# Patient Record
Sex: MALE | Race: WHITE | NOT HISPANIC OR LATINO | Employment: OTHER | URBAN - METROPOLITAN AREA
[De-identification: names, ages, dates, MRNs, and addresses within clinical notes are randomized per-mention and may not be internally consistent; named-entity substitution may affect disease eponyms.]

---

## 2019-02-06 ENCOUNTER — TRANSFERRED RECORDS (OUTPATIENT)
Dept: HEALTH INFORMATION MANAGEMENT | Facility: CLINIC | Age: 79
End: 2019-02-06

## 2019-04-01 ENCOUNTER — HOSPITAL ENCOUNTER (OUTPATIENT)
Dept: CARDIOLOGY | Facility: CLINIC | Age: 79
Discharge: HOME | End: 2019-04-01
Attending: INTERNAL MEDICINE | Admitting: INTERNAL MEDICINE
Payer: COMMERCIAL

## 2019-04-01 DIAGNOSIS — I48.91 ATRIAL FIBRILLATION, UNSPECIFIED TYPE (H): ICD-10-CM

## 2019-04-01 PROCEDURE — 0298T ZIO PATCH HOLTER ADULT PEDIATRIC GREATER THAN 48 HRS: CPT | Performed by: INTERNAL MEDICINE

## 2019-04-01 PROCEDURE — 0296T ZIO PATCH HOLTER ADULT PEDIATRIC GREATER THAN 48 HRS: CPT

## 2019-09-12 ENCOUNTER — TRANSFERRED RECORDS (OUTPATIENT)
Dept: HEALTH INFORMATION MANAGEMENT | Facility: CLINIC | Age: 79
End: 2019-09-12

## 2019-09-24 ENCOUNTER — TRANSFERRED RECORDS (OUTPATIENT)
Dept: HEALTH INFORMATION MANAGEMENT | Facility: CLINIC | Age: 79
End: 2019-09-24

## 2019-10-30 ENCOUNTER — APPOINTMENT (OUTPATIENT)
Dept: CT IMAGING | Facility: CLINIC | Age: 79
DRG: 963 | End: 2019-10-30
Attending: EMERGENCY MEDICINE
Payer: COMMERCIAL

## 2019-10-30 ENCOUNTER — HOSPITAL ENCOUNTER (INPATIENT)
Facility: CLINIC | Age: 79
LOS: 10 days | Discharge: NURSING, REHAB OR LONG-TERM CARE | DRG: 963 | End: 2019-11-10
Attending: EMERGENCY MEDICINE | Admitting: INTERNAL MEDICINE
Payer: COMMERCIAL

## 2019-10-30 ENCOUNTER — APPOINTMENT (OUTPATIENT)
Dept: GENERAL RADIOLOGY | Facility: CLINIC | Age: 79
DRG: 963 | End: 2019-10-30
Attending: EMERGENCY MEDICINE
Payer: COMMERCIAL

## 2019-10-30 DIAGNOSIS — S12.291A OTHER CLOSED NONDISPLACED FRACTURE OF THIRD CERVICAL VERTEBRA, INITIAL ENCOUNTER (H): ICD-10-CM

## 2019-10-30 DIAGNOSIS — K21.00 GASTROESOPHAGEAL REFLUX DISEASE WITH ESOPHAGITIS: ICD-10-CM

## 2019-10-30 DIAGNOSIS — S22.029A CLOSED FRACTURE OF SECOND THORACIC VERTEBRA, UNSPECIFIED FRACTURE MORPHOLOGY, INITIAL ENCOUNTER (H): ICD-10-CM

## 2019-10-30 DIAGNOSIS — S09.90XA CLOSED HEAD INJURY, INITIAL ENCOUNTER: ICD-10-CM

## 2019-10-30 DIAGNOSIS — I48.20 CHRONIC ATRIAL FIBRILLATION (H): ICD-10-CM

## 2019-10-30 DIAGNOSIS — J94.2 HEMOTHORAX ON LEFT: ICD-10-CM

## 2019-10-30 DIAGNOSIS — R31.0 GROSS HEMATURIA: Primary | ICD-10-CM

## 2019-10-30 DIAGNOSIS — I10 ESSENTIAL HYPERTENSION: ICD-10-CM

## 2019-10-30 DIAGNOSIS — S22.42XA CLOSED FRACTURE OF MULTIPLE RIBS OF LEFT SIDE, INITIAL ENCOUNTER: ICD-10-CM

## 2019-10-30 DIAGNOSIS — G47.00 INSOMNIA, UNSPECIFIED TYPE: ICD-10-CM

## 2019-10-30 DIAGNOSIS — E11.9 TYPE 2 DIABETES, HBA1C GOAL < 7% (H): ICD-10-CM

## 2019-10-30 DIAGNOSIS — K21.9 GASTROESOPHAGEAL REFLUX DISEASE WITHOUT ESOPHAGITIS: ICD-10-CM

## 2019-10-30 LAB
ABO + RH BLD: NORMAL
ABO + RH BLD: NORMAL
ALBUMIN SERPL-MCNC: 3.6 G/DL (ref 3.4–5)
ALP SERPL-CCNC: 73 U/L (ref 40–150)
ALT SERPL W P-5'-P-CCNC: 30 U/L (ref 0–70)
ANION GAP SERPL CALCULATED.3IONS-SCNC: 5 MMOL/L (ref 3–14)
AST SERPL W P-5'-P-CCNC: 44 U/L (ref 0–45)
BASOPHILS # BLD AUTO: 0.1 10E9/L (ref 0–0.2)
BASOPHILS NFR BLD AUTO: 0.3 %
BILIRUB SERPL-MCNC: 0.6 MG/DL (ref 0.2–1.3)
BLD GP AB SCN SERPL QL: NORMAL
BLOOD BANK CMNT PATIENT-IMP: NORMAL
BUN SERPL-MCNC: 15 MG/DL (ref 7–30)
CALCIUM SERPL-MCNC: 8.8 MG/DL (ref 8.5–10.1)
CHLORIDE SERPL-SCNC: 104 MMOL/L (ref 94–109)
CO2 SERPL-SCNC: 31 MMOL/L (ref 20–32)
CREAT SERPL-MCNC: 1.09 MG/DL (ref 0.66–1.25)
DIFFERENTIAL METHOD BLD: ABNORMAL
EOSINOPHIL # BLD AUTO: 0.3 10E9/L (ref 0–0.7)
EOSINOPHIL NFR BLD AUTO: 1.2 %
ERYTHROCYTE [DISTWIDTH] IN BLOOD BY AUTOMATED COUNT: 15.5 % (ref 10–15)
GFR SERPL CREATININE-BSD FRML MDRD: 63 ML/MIN/{1.73_M2}
GLUCOSE BLDC GLUCOMTR-MCNC: 108 MG/DL (ref 70–99)
GLUCOSE BLDC GLUCOMTR-MCNC: 119 MG/DL (ref 70–99)
GLUCOSE SERPL-MCNC: 117 MG/DL (ref 70–99)
HCT VFR BLD AUTO: 38.3 % (ref 40–53)
HGB BLD-MCNC: 12.2 G/DL (ref 13.3–17.7)
IMM GRANULOCYTES # BLD: 0.2 10E9/L (ref 0–0.4)
IMM GRANULOCYTES NFR BLD: 0.9 %
INR PPP: 1.4 (ref 0.86–1.14)
INTERPRETATION ECG - MUSE: NORMAL
LYMPHOCYTES # BLD AUTO: 2 10E9/L (ref 0.8–5.3)
LYMPHOCYTES NFR BLD AUTO: 7.6 %
MCH RBC QN AUTO: 28.5 PG (ref 26.5–33)
MCHC RBC AUTO-ENTMCNC: 31.9 G/DL (ref 31.5–36.5)
MCV RBC AUTO: 90 FL (ref 78–100)
MONOCYTES # BLD AUTO: 1.3 10E9/L (ref 0–1.3)
MONOCYTES NFR BLD AUTO: 5.2 %
NEUTROPHILS # BLD AUTO: 21.7 10E9/L (ref 1.6–8.3)
NEUTROPHILS NFR BLD AUTO: 84.8 %
NRBC # BLD AUTO: 0 10*3/UL
NRBC BLD AUTO-RTO: 0 /100
PLATELET # BLD AUTO: 262 10E9/L (ref 150–450)
POTASSIUM SERPL-SCNC: 3.2 MMOL/L (ref 3.4–5.3)
PROT SERPL-MCNC: 6.3 G/DL (ref 6.8–8.8)
RADIOLOGIST FLAGS: ABNORMAL
RBC # BLD AUTO: 4.28 10E12/L (ref 4.4–5.9)
SODIUM SERPL-SCNC: 140 MMOL/L (ref 133–144)
SPECIMEN EXP DATE BLD: NORMAL
WBC # BLD AUTO: 25.6 10E9/L (ref 4–11)

## 2019-10-30 PROCEDURE — 99222 1ST HOSP IP/OBS MODERATE 55: CPT | Performed by: SURGERY

## 2019-10-30 PROCEDURE — 96376 TX/PRO/DX INJ SAME DRUG ADON: CPT

## 2019-10-30 PROCEDURE — 31500 INSERT EMERGENCY AIRWAY: CPT

## 2019-10-30 PROCEDURE — 86850 RBC ANTIBODY SCREEN: CPT | Performed by: EMERGENCY MEDICINE

## 2019-10-30 PROCEDURE — 40000986 XR CHEST PORT 1 VW

## 2019-10-30 PROCEDURE — 93005 ELECTROCARDIOGRAM TRACING: CPT

## 2019-10-30 PROCEDURE — 86901 BLOOD TYPING SEROLOGIC RH(D): CPT | Performed by: EMERGENCY MEDICINE

## 2019-10-30 PROCEDURE — 96375 TX/PRO/DX INJ NEW DRUG ADDON: CPT

## 2019-10-30 PROCEDURE — 86900 BLOOD TYPING SEROLOGIC ABO: CPT | Performed by: EMERGENCY MEDICINE

## 2019-10-30 PROCEDURE — 99222 1ST HOSP IP/OBS MODERATE 55: CPT | Performed by: INTERNAL MEDICINE

## 2019-10-30 PROCEDURE — 80053 COMPREHEN METABOLIC PANEL: CPT | Performed by: EMERGENCY MEDICINE

## 2019-10-30 PROCEDURE — 00000146 ZZHCL STATISTIC GLUCOSE BY METER IP

## 2019-10-30 PROCEDURE — 85610 PROTHROMBIN TIME: CPT | Performed by: EMERGENCY MEDICINE

## 2019-10-30 PROCEDURE — 25000132 ZZH RX MED GY IP 250 OP 250 PS 637: Performed by: INTERNAL MEDICINE

## 2019-10-30 PROCEDURE — 25000128 H RX IP 250 OP 636

## 2019-10-30 PROCEDURE — 99207 ZZC DOWN CODE DUE TO INITIAL EXAM: CPT | Performed by: INTERNAL MEDICINE

## 2019-10-30 PROCEDURE — 96374 THER/PROPH/DIAG INJ IV PUSH: CPT

## 2019-10-30 PROCEDURE — 72128 CT CHEST SPINE W/O DYE: CPT

## 2019-10-30 PROCEDURE — 72125 CT NECK SPINE W/O DYE: CPT

## 2019-10-30 PROCEDURE — 99285 EMERGENCY DEPT VISIT HI MDM: CPT | Mod: 25

## 2019-10-30 PROCEDURE — 70450 CT HEAD/BRAIN W/O DYE: CPT

## 2019-10-30 PROCEDURE — 25000128 H RX IP 250 OP 636: Performed by: EMERGENCY MEDICINE

## 2019-10-30 PROCEDURE — 72129 CT CHEST SPINE W/DYE: CPT

## 2019-10-30 PROCEDURE — 85025 COMPLETE CBC W/AUTO DIFF WBC: CPT | Performed by: EMERGENCY MEDICINE

## 2019-10-30 PROCEDURE — 71250 CT THORAX DX C-: CPT

## 2019-10-30 RX ORDER — CLONIDINE HYDROCHLORIDE 0.1 MG/1
0.3 TABLET ORAL 2 TIMES DAILY
Status: DISCONTINUED | OUTPATIENT
Start: 2019-10-30 | End: 2019-11-02

## 2019-10-30 RX ORDER — CLONIDINE HYDROCHLORIDE 0.3 MG/1
0.3 TABLET ORAL 2 TIMES DAILY
COMMUNITY
End: 2020-05-18 | Stop reason: ALTCHOICE

## 2019-10-30 RX ORDER — HYDROMORPHONE HYDROCHLORIDE 1 MG/ML
0.5 INJECTION, SOLUTION INTRAMUSCULAR; INTRAVENOUS; SUBCUTANEOUS ONCE
Status: COMPLETED | OUTPATIENT
Start: 2019-10-30 | End: 2019-10-30

## 2019-10-30 RX ORDER — ONDANSETRON 2 MG/ML
4 INJECTION INTRAMUSCULAR; INTRAVENOUS ONCE
Status: COMPLETED | OUTPATIENT
Start: 2019-10-30 | End: 2019-10-30

## 2019-10-30 RX ORDER — PROPRANOLOL HYDROCHLORIDE 20 MG/1
60 TABLET ORAL 2 TIMES DAILY
Status: DISCONTINUED | OUTPATIENT
Start: 2019-10-30 | End: 2019-11-01

## 2019-10-30 RX ORDER — FENTANYL CITRATE 50 UG/ML
INJECTION, SOLUTION INTRAMUSCULAR; INTRAVENOUS
Status: DISCONTINUED
Start: 2019-10-30 | End: 2019-10-30 | Stop reason: HOSPADM

## 2019-10-30 RX ORDER — MINOCYCLINE HYDROCHLORIDE 100 MG/1
100 CAPSULE ORAL 2 TIMES DAILY
Status: ON HOLD | COMMUNITY
End: 2019-12-30

## 2019-10-30 RX ORDER — NICOTINE POLACRILEX 4 MG
15-30 LOZENGE BUCCAL
Status: DISCONTINUED | OUTPATIENT
Start: 2019-10-30 | End: 2019-10-31

## 2019-10-30 RX ORDER — ONDANSETRON 2 MG/ML
INJECTION INTRAMUSCULAR; INTRAVENOUS
Status: DISCONTINUED
Start: 2019-10-30 | End: 2019-10-30 | Stop reason: HOSPADM

## 2019-10-30 RX ORDER — DEXTROSE MONOHYDRATE 25 G/50ML
25-50 INJECTION, SOLUTION INTRAVENOUS
Status: DISCONTINUED | OUTPATIENT
Start: 2019-10-30 | End: 2019-10-31

## 2019-10-30 RX ORDER — METOCLOPRAMIDE HYDROCHLORIDE 5 MG/ML
5 INJECTION INTRAMUSCULAR; INTRAVENOUS ONCE
Status: COMPLETED | OUTPATIENT
Start: 2019-10-30 | End: 2019-10-30

## 2019-10-30 RX ORDER — LISINOPRIL 20 MG/1
40 TABLET ORAL DAILY
Status: DISCONTINUED | OUTPATIENT
Start: 2019-10-31 | End: 2019-10-30

## 2019-10-30 RX ORDER — MORPHINE SULFATE 4 MG/ML
INJECTION, SOLUTION INTRAMUSCULAR; INTRAVENOUS
Status: COMPLETED
Start: 2019-10-30 | End: 2019-10-30

## 2019-10-30 RX ORDER — FENTANYL CITRATE 50 UG/ML
25 INJECTION, SOLUTION INTRAMUSCULAR; INTRAVENOUS ONCE
Status: COMPLETED | OUTPATIENT
Start: 2019-10-30 | End: 2019-10-30

## 2019-10-30 RX ORDER — LOSARTAN POTASSIUM AND HYDROCHLOROTHIAZIDE 25; 100 MG/1; MG/1
1 TABLET ORAL DAILY
Status: ON HOLD | COMMUNITY
End: 2019-11-10

## 2019-10-30 RX ORDER — PROPRANOLOL HYDROCHLORIDE 60 MG/1
60 TABLET ORAL AT BEDTIME
Status: ON HOLD | COMMUNITY
End: 2019-11-10

## 2019-10-30 RX ORDER — LOSARTAN POTASSIUM AND HYDROCHLOROTHIAZIDE 25; 100 MG/1; MG/1
1 TABLET ORAL DAILY
Status: DISCONTINUED | OUTPATIENT
Start: 2019-10-31 | End: 2019-10-31

## 2019-10-30 RX ORDER — HYDROMORPHONE HYDROCHLORIDE 1 MG/ML
0.5 INJECTION, SOLUTION INTRAMUSCULAR; INTRAVENOUS; SUBCUTANEOUS
Status: COMPLETED | OUTPATIENT
Start: 2019-10-30 | End: 2019-10-31

## 2019-10-30 RX ORDER — SIMVASTATIN 10 MG
10 TABLET ORAL AT BEDTIME
Status: DISCONTINUED | OUTPATIENT
Start: 2019-10-30 | End: 2019-11-07

## 2019-10-30 RX ORDER — PIOGLITAZONEHYDROCHLORIDE 30 MG/1
30 TABLET ORAL
Status: ON HOLD | COMMUNITY
End: 2020-04-30

## 2019-10-30 RX ADMIN — SIMVASTATIN 10 MG: 10 TABLET, FILM COATED ORAL at 22:44

## 2019-10-30 RX ADMIN — METOCLOPRAMIDE 5 MG: 5 INJECTION, SOLUTION INTRAMUSCULAR; INTRAVENOUS at 20:06

## 2019-10-30 RX ADMIN — FENTANYL CITRATE 25 MCG: 50 INJECTION, SOLUTION INTRAMUSCULAR; INTRAVENOUS at 18:51

## 2019-10-30 RX ADMIN — HYDROMORPHONE HYDROCHLORIDE 0.5 MG: 1 INJECTION, SOLUTION INTRAMUSCULAR; INTRAVENOUS; SUBCUTANEOUS at 17:56

## 2019-10-30 RX ADMIN — PROPRANOLOL HYDROCHLORIDE 60 MG: 20 TABLET ORAL at 22:44

## 2019-10-30 RX ADMIN — CLONIDINE HYDROCHLORIDE 0.3 MG: 0.1 TABLET ORAL at 22:43

## 2019-10-30 RX ADMIN — ONDANSETRON 4 MG: 2 INJECTION INTRAMUSCULAR; INTRAVENOUS at 19:37

## 2019-10-30 RX ADMIN — MORPHINE SULFATE 4 MG: 4 INJECTION INTRAVENOUS at 15:51

## 2019-10-30 RX ADMIN — ONDANSETRON 4 MG: 2 INJECTION INTRAMUSCULAR; INTRAVENOUS at 16:41

## 2019-10-30 RX ADMIN — SODIUM CHLORIDE 1000 ML: 9 INJECTION, SOLUTION INTRAVENOUS at 20:21

## 2019-10-30 RX ADMIN — HYDROMORPHONE HYDROCHLORIDE 0.5 MG: 1 INJECTION, SOLUTION INTRAMUSCULAR; INTRAVENOUS; SUBCUTANEOUS at 22:43

## 2019-10-30 RX ADMIN — HYDROMORPHONE HYDROCHLORIDE 0.5 MG: 1 INJECTION, SOLUTION INTRAMUSCULAR; INTRAVENOUS; SUBCUTANEOUS at 16:40

## 2019-10-30 ASSESSMENT — ENCOUNTER SYMPTOMS
NECK PAIN: 1
LIGHT-HEADEDNESS: 0
DIZZINESS: 0
GASTROINTESTINAL NEGATIVE: 1
CARDIOVASCULAR NEGATIVE: 1
NUMBNESS: 0
PSYCHIATRIC NEGATIVE: 1
SHORTNESS OF BREATH: 1
WEAKNESS: 0
HEADACHES: 0
BACK PAIN: 1

## 2019-10-30 ASSESSMENT — MIFFLIN-ST. JEOR
SCORE: 1601.25
SCORE: 1646.12

## 2019-10-30 ASSESSMENT — VISUAL ACUITY: OU: NORMAL ACUITY

## 2019-10-30 NOTE — CONSULTS
[facility]  Trauma Consult         Bryan Hare MD    Tc Garcia MRN# [id number]   YOB: 1940 Age: 79 year old      Date of Admission:  10/30/2019         Assessment and Plan:   Patient is a 79 year old male with multiple injuries after a fall    PLAN:  I have personally reviewed all imaging and performed a trauma examination.  Pertinent findings include multiple left-sided rib fractures with a moderate left pneumothorax. Thoracic surgery was contacted and a chest tube will be placed in the emergency room due to the hemothorax and history of taking Eliquis. Thoracic surgery will also follow ongoing care related to his chest injury.  His CT also showed an acute C3 spinous process fracture and acute fractures of the anterior lateral T2.  Dedicated thoracic spine imaging has been ordered.  Spine surgery will be consulted for further management regarding these issues.  He does not have any other apparent injuries on comprehensive examination.  He will be at high risk of respiratory issues given the significant rib fractures.  He will need aggressive pulmonary hygiene and close monitoring.  Acute care surgery will follow-up in a.m..          Chief Complaint:     Chief Complaint   Patient presents with     Fall          History of Present Illness:   Tc Garcia is a 79 year old male who I was asked to see in consultation by Dr. Sebastian for fall with multiple injuries.  The patient had a unwitnessed fall at ground height and fell down a half flight of stairs.  He slipped and landed on his left shoulder and head.  He felt fine before the fall without any symptoms.  He had no loss of consciousness.  He complained of severe shoulder and chest pain after the fall.  He was brought to the ER by an ambulance.  He has a history of A. fib on Eliquis.  He is currently complaining of severe left-sided chest pain.  He denies shortness of breath. Patient denies fevers, chills, nausea, vomiting,  "SOB, chest pain, abdominal pain..          Physical Exam:   /82   Pulse 68   Temp 97.6  F (36.4  C) (Oral)   Resp 9   Ht 1.778 m (5' 10\")   Wt 93 kg (205 lb)   SpO2 91%   BMI 29.41 kg/m    General: GCS- Eyes-Spontaneous--open with blinking at baseline  =  4 points Verbal Oriented  =  5 points Motor Obeys commands for movement  =  6 points  Head: No abrasions or external trauma.  Eyes: Pupils 3 mm bilaterally and reactive to light, EOM full, no proptosis, no conjunctival injection  Ears: No external auditory canal discharge or bleeding.  Nose: No rhinorrhea or bleeding noted  Mouth: Atraumatic. No posterior pharyngeal erythema. No dental trauma. No malocclusion  Neck: No midline tenderness. No step off deformity.  Cardiovascular: Pulses palpable  Respiratory: Equal bilateral. No distress. No crepitance.  GI: Abdomen Soft Non-Tender entire abdomen No hernias palpated..  Musculoskeletal: Full ROM of all major joints w/o crepitance. Extremities are warm and well perfused. Compression of pelvis elicits no pain.   Back- No TTP over midline thoracic or lumbar spine, no abrasions, overlying skin changes, or palpable step-offs.  Neurologic: 5/5 UE and LE strength.   Integument: No obvious rashes or external injury noted  Psychiatric: Mood and Affect normal. No anxiety.        Past Medical History:     Past Medical History:   Diagnosis Date     Diabetic PROTEINURIA 2003     Mixed hyperlipidemia 2003     Personal history of colonic polyps 1972    gets colonoscopy every five years, due in 2006     Rosacea 1989     Type II or unspecified type diabetes mellitus without mention of complication, not stated as uncontrolled 1999     Unspecified essential hypertension 1994          Past Surgical History:     Past Surgical History:   Procedure Laterality Date     HC REMOVE TONSILS/ADENOIDS,<10 Y/O      T & A <11y.o.          Current Medications:         HYDROmorphone       Home Medications:     Prior to Admission " medications    Medication Sig Last Dose Taking? Auth Provider   ACCU-CHEK COMPACT TEST DRUM VI STRP check blood sugar three times a day and PRN   Tc Palacios MD   ASPIRIN 81 MG OR TABS 1 tab po QD (Once per day)   Tc Palacios MD   AVALIDE 300-25 MG OR TABS 1 TABLET DAILY   Tc Palacios MD   BYETTA 5 MCG PEN 5 MCG/0.02ML SC SOLN INJECT 5 MCG TWICE A DAY AS DIRECTED   Tc Palacios MD   GLUCAGON EMERGENCY KIT 1 MG IJ KIT as directed   Tc Palacios MD   HUMALOG 100 UNIT/ML SC SOLN For insulin pump use--2 vials should last about 30 days   Tc Palacios MD   LISINOPRIL 40 MG PO TABS 1 TABLET DAILY   Tc Palacios MD   LOVASTATIN 20 MG PO TABS 1 TABLET DAILY AT DINNER   Tc Palacios MD   NEW MED actos plus metformin 15/875mg bid   Tc Palacios MD   VERAPAMIL HCL### 240 MG OR TBCR 1 TABLET DAILY   Tc Palacios MD   VIAGRA 100 MG OR TABS ONE TABLET TAKEN AT LEAST 30 MINUTES BEFORE INTERCOURSE   Tc Palacios MD          Allergies:     Allergies   Allergen Reactions     No Known Drug Allergies           Family History:      Family History   Problem Relation Age of Onset     Family History Negative Mother          age 71     Family History Negative Father          age 70     Diabetes Brother         alive age 77     Diabetes Brother         alive age 76     Family History Negative Brother              Family History Negative Brother              Diabetes Sister         alive age74     Family History Negative Sister          age 86     Heart Disease Sister              Family History Negative Sister              Family History Negative Sister              Diabetes Sister      Diabetes Sister      Diabetes Brother      Diabetes Brother          Social History:   Tc Garcia  reports that he quit smoking about 11 years ago. His smoking use included cigarettes. He has a 8.00 pack-year smoking history. He has never used  smokeless tobacco. He reports current alcohol use of about 35.0 standard drinks of alcohol per week.        Review of Systems:   The 10 point Review of Systems is negative other than noted in the HPI.       Labs/Imaging   All new lab and imaging data was reviewed.   Bryan Hare M.D.  [facility]

## 2019-10-30 NOTE — ED TRIAGE NOTES
Fall downstairs into the basement. Sinus bradycardic with EMS. 50 of Fentanyl given per EMS. Patient in a back board and C-collar

## 2019-10-30 NOTE — ED PROVIDER NOTES
History     Chief Complaint:    Fall      HPI   Tc Garcia is a 79 year old male who presents after an unwitnessed mechanical fall from ground height down half flight of stairs. Pt reports he tripped on a slipper on the steps, fell and hit his head and L shoulder against wall. Denies LOC, reports feeling fine before fall, no dizziness, lightheadedness, chest pain, or HA. He complains of left shoulder pain and left chest wall pain.   On eliquis and verapamil for a-fib. He does have DM-II, on insulin, and reports BG have been well controlled. EKG in the field found prolonged QT with sinus bradycardia of 49 bpm. Denies any EtOH or drug use.      Allergies:    Allergies   Allergen Reactions     No Known Drug Allergies         Medications:      No current outpatient medications on file.      Problem List:      Patient Active Problem List    Diagnosis Date Noted     TYPE 2 DIABETES, HBA1C GOAL < 7% 10/31/2010     Priority: Medium     HYPERLIPIDEMIA LDL GOAL <100 10/31/2010     Priority: Medium     Pain in limb 07/18/2006     Priority: Medium     Plantar fascial fibromatosis 07/18/2006     Priority: Medium     Diabetes mellitus, type 2 (H) 07/07/2004     Priority: Medium     Problem list name updated by automated process. Provider to review       Mixed hyperlipidemia 07/07/2004     Priority: Medium     Essential hypertension 07/07/2004     Priority: Medium     Problem list name updated by automated process. Provider to review          Past Medical History:      Past Medical History:   Diagnosis Date     Diabetic PROTEINURIA 2003     Mixed hyperlipidemia 2003     Personal history of colonic polyps 1972     Rosacea 1989     Type II or unspecified type diabetes mellitus without mention of complication, not stated as uncontrolled 1999     Unspecified essential hypertension 1994       Past Surgical History:      Past Surgical History:   Procedure Laterality Date     HC REMOVE TONSILS/ADENOIDS,<10 Y/O      T & A <11y.o.  "      Family History:      Family History   Problem Relation Age of Onset     Family History Negative Mother          age 71     Family History Negative Father          age 70     Diabetes Brother         alive age 77     Diabetes Brother         alive age 76     Family History Negative Brother              Family History Negative Brother              Diabetes Sister         alive age76     Family History Negative Sister          age 86     Heart Disease Sister              Family History Negative Sister              Family History Negative Sister              Diabetes Sister      Diabetes Sister      Diabetes Brother      Diabetes Brother        Social History:    Marital Status:   [2]  Social History     Tobacco Use     Smoking status: Former Smoker     Packs/day: 0.20     Years: 40.00     Pack years: 8.00     Types: Cigarettes     Last attempt to quit: 2008     Years since quittin.8     Smokeless tobacco: Never Used     Tobacco comment: occasional   Substance Use Topics     Alcohol use: Yes     Alcohol/week: 35.0 standard drinks     Comment: occasional     Drug use: None        Review of Systems   Respiratory: Positive for shortness of breath.    Cardiovascular: Negative.    Gastrointestinal: Negative.    Genitourinary: Negative.    Musculoskeletal: Positive for back pain and neck pain.   Neurological: Negative for dizziness, weakness, light-headedness, numbness and headaches.   Psychiatric/Behavioral: Negative.    All other systems reviewed and are negative.    Pain in L shoulder, L chest wall, and neck. No back pain. Denies numbness    Physical Exam   First Vitals:  BP: (!) 151/116  Pulse: 52  Heart Rate: 72  Temp: 97.6  F (36.4  C)  Resp: 18  Height: 177.8 cm (5' 10\")  Weight: 93 kg (205 lb)  SpO2: 97 %      Physical Exam  Gen: Elderly adult male, in moderate distress  Head: no signs of trauma  Eyes\" normal  Neck: c-collar, mild " tenderness  Neuro: PERRLA, oriented to person, place, situation. CN II-XII grossly intact. Symmetric 5/5 , plantar flexion and dorsiflexion. Sensation in tact throughout.  CV: Regular rate and rhythm, no murmur, rubs, or gallops appreciated  Pulm: Normal breath sounds in all lung fields. Tachypneic. Wincing with pain on deep inspiration.   Abdomen: Non-tender, non-distended, normal contour  MSK: Tender over L AC joint and L anterior chest wall. Non-tender L humerus.  Skin: Lacerations on posterior R hand, posterior R wrist, and L hand, ~1 cm each. No lacerations or ecchymoses on head or elsewhere.  Psych: normal      Emergency Department Course   EKG  No ischemic changes, sinus harrison    Imaging:  XR Chest Port 1 View   Final Result   IMPRESSION: Chest tube placed on the left with tip near the apex.   Multiple rib fractures noted on the left. Right lung clear.      ALLYSON MARTINEZ MD      CT Thoracic Spine w Contrast   Preliminary Result   IMPRESSION:   1. Subtle nondisplaced acute fracture involving the T2 inferior   endplate extending into the T2-T3 disc space.   2. Age-indeterminate mild contour deformities involving the T2 and T3   superior endplates.   3. Acute left T5-T9 transverse process fractures.   4. Multiple acute left-sided rib fractures, left pulmonary contusion,   and left hemopneumothorax, better characterized on chest CT of same   date.      Chest CT w/o contrast   Final Result   IMPRESSION: Multiple left-sided rib fractures, ribs 6, 8, and 9 are   fractured in two places. Moderate left hemothorax and trace pleural   air on the left.      ALLYSON MARTINEZ MD      CT Head w/o Contrast   Preliminary Result   IMPRESSION:   1. No CT findings of acute traumatic intracranial abnormality.   2. Generalized brain parenchymal volume loss. Scattered   hypoattenuation in the cerebral white matter, likely related to small   vessel ischemic disease.   3. Other chronic-appearing intracranial findings, as detailed in  the   body of the report.   4. On the  image, note is made of asymmetric opacification of the   left lung apex, incompletely evaluated. Recommend dedicated chest   radiographs for further characterization.      CT Cervical Spine w/o Contrast   Preliminary Result   Abnormal   IMPRESSION:   1. Acute C3 spinous process fracture.   2. Acute fractures involving the right anterolateral aspect of the T2   vertebral body extending into the disc space. Recommend dedicated   thoracic spine CT.   3. Subtle cortical irregularity involving the right aspect of the C7   vertebral body, indeterminate for subtle acute fracture.   4. Multiple acute left-sided upper rib fractures with left-sided   pulmonary contusion and hemothorax.      [Critical Result: Acute cervical spine fracture]      Finding was identified on 10/30/2019 5:38 PM.       Dr. Sebastian was contacted by me on 10/30/2019 5:52 PM and verbalized   understanding of the critical result.           Laboratory:  Results for orders placed or performed during the hospital encounter of 10/30/19   CT Head w/o Contrast    Narrative    CT SCAN OF THE HEAD WITHOUT CONTRAST   10/30/2019 5:21 PM     HISTORY: Trauma    TECHNIQUE: Axial images of the head and coronal reformations without  IV contrast material. Radiation dose for this scan was reduced using  automated exposure control, adjustment of the mA and/or kV according  to patient size, or iterative reconstruction technique.    COMPARISON: None.    FINDINGS: There is no evidence of intracranial hemorrhage, mass, acute  infarct or other acute abnormality. Generalized atrophy of the brain.  There is low attenuation in the white matter of the cerebral  hemispheres consistent with sequelae of small vessel ischemic disease.  Ventricular size is within normal limits without evidence of  hydrocephalus. There is a round low-attenuation lesion measuring  approximately 4 mm (series 2 image 17) interposed between the anterior  aspect of  the midbrain and the superior aspect of the basilar artery,  essentially in the upper aspect of the interpeduncular cistern. This  lesion appears to demonstrate fat attenuation internally, and may  represent a small lipoma. No associated mass effect.    Bilateral lens replacements. Polypoid mucosal thickening in the  alveolar recess of the left maxillary sinus. Mastoid and middle ear  cavities appear clear. Advanced bilateral temporomandibular joint  osteoarthritis. The bony calvarium and bones of the skull base appear  intact.     On the  image, there appears to be asymmetric opacification  projecting over the left lung apex, incompletely evaluated.      Impression    IMPRESSION:  1. No CT findings of acute traumatic intracranial abnormality.  2. Generalized brain parenchymal volume loss. Scattered  hypoattenuation in the cerebral white matter, likely related to small  vessel ischemic disease.  3. Other chronic-appearing intracranial findings, as detailed in the  body of the report.  4. On the  image, note is made of asymmetric opacification of the  left lung apex, incompletely evaluated. Recommend dedicated chest  radiographs for further characterization.   CT Cervical Spine w/o Contrast   Result Value Ref Range    Radiologist flags Acute cervical spine fracture (AA)     Narrative    CT CERVICAL SPINE WITHOUT CONTRAST   10/30/2019 5:20 PM     HISTORY: Trauma     TECHNIQUE: Axial images of the cervical spine were obtained without  intravenous contrast. Multiplanar reformations were performed.   Radiation dose for this scan was reduced using automated exposure  control, adjustment of the mA and/or kV according to patient size, or  iterative reconstruction technique.    COMPARISON: None.    FINDINGS: There is an acute fracture that involves the spinous process  of C3 (series 6 image 38-40, series 5 image 37). There is a subtle  cortical irregularity involving the right aspect of the C7 vertebral  body which  is indeterminate for a nondisplaced fracture. There are  acute fractures involving right anterolateral aspect of the T2  inferior endplate extending into the intervertebral disc space,  possibly also involving the superior endplate of T2 but incompletely  evaluated. Multiple left-sided upper rib fractures. Contusion of the  left upper pulmonary lobe with associated pleural fluid/hemothorax.  Small volume scattered air noted within the anterior and posterior  soft tissues of the upper chest/back.    There is a background of multilevel degenerative disc disease and  facet arthropathy of the cervical spine. Multilevel uncovertebral  arthropathy is also noted. No high-grade spinal stenosis is  identified. Varying degrees of multilevel neural foraminal narrowing  is seen, most pronounced on the left at C4-C5. Bilateral carotid  bifurcation atherosclerotic calcifications are noted.      Impression    IMPRESSION:  1. Acute C3 spinous process fracture.  2. Acute fractures involving the right anterolateral aspect of the T2  vertebral body extending into the disc space. Recommend dedicated  thoracic spine CT.  3. Subtle cortical irregularity involving the right aspect of the C7  vertebral body, indeterminate for subtle acute fracture.  4. Multiple acute left-sided upper rib fractures with left-sided  pulmonary contusion and hemothorax.    [Critical Result: Acute cervical spine fracture]    Finding was identified on 10/30/2019 5:38 PM.     Dr. Sebastian was contacted by me on 10/30/2019 5:52 PM and verbalized  understanding of the critical result.    Chest CT w/o contrast    Narrative    CT CHEST WITHOUT CONTRAST 10/30/2019 5:21 PM     HISTORY: Trauma, left chest/shoulder pain.    COMPARISON: None.    TECHNIQUE: Volumetric helical acquisition of CT images of the chest  from the clavicles to the kidneys were acquired without IV contrast.  Radiation dose for this scan was reduced using automated exposure  control, adjustment of the  mA and/or kV according to patient size, or  iterative reconstruction technique.    FINDINGS:  Posterolateral fourth, fifth, sixth, seventh, eighth, and  ninth rib fractures noted on the left. Rib 7 is displaced, and  overlapping. Ribs 8 and 9 are displaced as well as 5, 4, and 6. There  is a moderate hemothorax. Trace probable pneumothorax inferiorly.  Pulmonary contusion and/or atelectasis noted on the left. Minimal  right pleural effusion and associated atelectasis. Medial rib  fractures of 8th and 9th ribs as well as a posterior fracture of the  sixth rib. There are extensive coronary vascular calcifications and/or  stents consistent with coronary artery disease. There are mild  atherosclerotic changes of the visualized aorta and its branches.  There is no evidence of aortic aneurysm. No acute findings in the  visualized upper abdomen.      Impression    IMPRESSION: Multiple left-sided rib fractures, ribs 6, 8, and 9 are  fractured in two places. Moderate left hemothorax and trace pleural  air on the left.    ALLYSON MARTINEZ MD   CT Thoracic Spine w Contrast    Narrative    CT THORACIC SPINE WITHOUT CONTRAST   10/30/2019 6:32 PM     HISTORY: Thoracic spine fracture, traumatic. Trauma, multiple rib  fractures, CT cervical spine shows C3 spinous process fracture and  T2-T3 fracture into spine.     TECHNIQUE: Axial images of the thoracic spine were obtained without  intravenous contrast. Multiplanar reformations were performed.  Radiation dose for this scan was reduced using automated exposure  control, adjustment of the mA and/or kV according to patient size, or  iterative reconstruction technique.    COMPARISON: Cervical spine CT same date.    FINDINGS: Subtle nondisplaced acute fracture involving the inferior  endplate of T2 (series 15 image 16). The fracture line extends into  the region of the T2-T3 intervertebral disc space. Slight undulation  of the superior endplates of T3 and T4, age-indeterminate.  The  remaining vertebral body heights appear maintained. There are acute  nondisplaced fractures involving the left T5-T9 transverse processes.  Alignment of the thoracic spine is within normal limits. Bridging  ossification along the right anterolateral aspect of the upper to mid  thoracic spine, most pronounced from T3 through T11, consistent with  diffuse idiopathic skeletal hyperostosis. There is a background of  mild multilevel degenerative disc disease. No high-grade spinal  stenosis is seen.    Multiple acute left-sided rib fractures are also noted. Partially  visualized left pulmonary contusions with left hemothorax and trace  left pleural air, better characterized on chest CT of same date.  Please see chest CT of same day for details regarding extraspinal  findings. Small right pleural effusion also noted with basilar  atelectasis. Coronary artery and aortic atherosclerotic calcifications  are noted.      Impression    IMPRESSION:  1. Subtle nondisplaced acute fracture involving the T2 inferior  endplate extending into the T2-T3 disc space.  2. Age-indeterminate mild contour deformities involving the T2 and T3  superior endplates.  3. Acute left T5-T9 transverse process fractures.  4. Multiple acute left-sided rib fractures, left pulmonary contusion,  and left hemopneumothorax, better characterized on chest CT of same  date.   XR Chest Port 1 View    Narrative    CHEST ONE VIEW SUPINE 10/30/2019 7:06 PM     HISTORY: Chest tube placement    COMPARISON: None.      Impression    IMPRESSION: Chest tube placed on the left with tip near the apex.  Multiple rib fractures noted on the left. Right lung clear.    ALLYSON MARTINEZ MD   CBC with platelets differential   Result Value Ref Range    WBC 25.6 (H) 4.0 - 11.0 10e9/L    RBC Count 4.28 (L) 4.4 - 5.9 10e12/L    Hemoglobin 12.2 (L) 13.3 - 17.7 g/dL    Hematocrit 38.3 (L) 40.0 - 53.0 %    MCV 90 78 - 100 fl    MCH 28.5 26.5 - 33.0 pg    MCHC 31.9 31.5 - 36.5 g/dL     RDW 15.5 (H) 10.0 - 15.0 %    Platelet Count 262 150 - 450 10e9/L    Diff Method Automated Method     % Neutrophils 84.8 %    % Lymphocytes 7.6 %    % Monocytes 5.2 %    % Eosinophils 1.2 %    % Basophils 0.3 %    % Immature Granulocytes 0.9 %    Nucleated RBCs 0 0 /100    Absolute Neutrophil 21.7 (H) 1.6 - 8.3 10e9/L    Absolute Lymphocytes 2.0 0.8 - 5.3 10e9/L    Absolute Monocytes 1.3 0.0 - 1.3 10e9/L    Absolute Eosinophils 0.3 0.0 - 0.7 10e9/L    Absolute Basophils 0.1 0.0 - 0.2 10e9/L    Abs Immature Granulocytes 0.2 0 - 0.4 10e9/L    Absolute Nucleated RBC 0.0    Comprehensive metabolic panel   Result Value Ref Range    Sodium 140 133 - 144 mmol/L    Potassium 3.2 (L) 3.4 - 5.3 mmol/L    Chloride 104 94 - 109 mmol/L    Carbon Dioxide 31 20 - 32 mmol/L    Anion Gap 5 3 - 14 mmol/L    Glucose 117 (H) 70 - 99 mg/dL    Urea Nitrogen 15 7 - 30 mg/dL    Creatinine 1.09 0.66 - 1.25 mg/dL    GFR Estimate 63 >60 mL/min/[1.73_m2]    GFR Estimate If Black 74 >60 mL/min/[1.73_m2]    Calcium 8.8 8.5 - 10.1 mg/dL    Bilirubin Total 0.6 0.2 - 1.3 mg/dL    Albumin 3.6 3.4 - 5.0 g/dL    Protein Total 6.3 (L) 6.8 - 8.8 g/dL    Alkaline Phosphatase 73 40 - 150 U/L    ALT 30 0 - 70 U/L    AST 44 0 - 45 U/L   INR   Result Value Ref Range    INR 1.40 (H) 0.86 - 1.14   Glucose by meter   Result Value Ref Range    Glucose 108 (H) 70 - 99 mg/dL   EKG 12 lead   Result Value Ref Range    Interpretation ECG Click View Image link to view waveform and result    ABO/Rh type and screen   Result Value Ref Range    ABO O     RH(D) Pos     Antibody Screen Neg     Test Valid Only At [facility]        Specimen Expires 11/02/2019          [facility]    -Chest Tube Insertion  Date/Time: 10/30/2019 9:39 PM  Performed by: Estrada Sebastian MD  Authorized by: Estrada Sebastian MD     UNIVERSAL PROTOCOL   Site Marked: Yes  Prior Images Obtained and Reviewed:  Yes  Patient identity confirmed:  Verbally with patient, arm  band and hospital-assigned identification number  Patient was reevaluated immediately before administering moderate or deep sedation or anesthesia  Confirmation Checklist:  Patient's identity using two indicators, relevant allergies, procedure was appropriate and matched the consent or emergent situation and correct equipment/implants were available  Time out: Immediately prior to the procedure a time out was called    Preparation: Patient was prepped and draped in usual sterile fashion      PRE-PROCEDURE DETAILS:     Skin preparation:  ChloraPrep  ANESTHESIA (see MAR for exact dosages):     Anesthesia method:  Local infiltration    Local anesthetic:  Lidocaine 1% w/o epi    PROCEDURE DETAILS:     Placement location:  L lateral    Scalpel size:  10    Tube size (Fr):  24    Dissection instrument:  Sofía clamp    Ultrasound guidance: no      Tension pneumothorax: no      Tube connected to:  Suction    Drainage characteristics:  Bloody    Suture material:  0 silk    Dressing:  4x4 sterile gauze and petrolatum-impregnated gauze    POST-PROCEDURE DETAILS:     Post-insertion x-ray findings: tube in good position    PROCEDURE   Patient Tolerance:  Patient tolerated the procedure well with no immediate complications      :            Interventions:  Medications   HYDROmorphone (PF) (DILAUDID) injection 0.5 mg (0.5 mg Intravenous Given 10/30/19 1756)   0.9% sodium chloride BOLUS (1,000 mLs Intravenous Not Given 10/30/19 2126)     Followed by   0.9% sodium chloride BOLUS (1,000 mLs Intravenous New Bag 10/30/19 2021)   cloNIDine (CATAPRES) tablet 0.3 mg (has no administration in time range)   losartan-hydrochlorothiazide (HYZAAR) 100-25 MG per tablet 1 tablet (has no administration in time range)   propranolol (INDERAL) tablet 60 mg (has no administration in time range)   simvastatin (ZOCOR) tablet 10 mg (has no administration in time range)   glucose gel 15-30 g (has no administration in time range)     Or   dextrose 50 %  injection 25-50 mL (has no administration in time range)     Or   glucagon injection 1 mg (has no administration in time range)   insulin glargine (LANTUS PEN) injection 20 Units (has no administration in time range)   insulin aspart (NovoLOG) inj (RAPID ACTING) (has no administration in time range)   insulin aspart (NovoLOG) inj (RAPID ACTING) (1 Units Subcutaneous Not Given 10/30/19 2127)   morphine (PF) 4 MG/ML injection (4 mg  Given 10/30/19 1551)   HYDROmorphone (PF) (DILAUDID) injection 0.5 mg (0.5 mg Intravenous Given 10/30/19 1640)   ondansetron (ZOFRAN) injection 4 mg (4 mg Intravenous Given 10/30/19 1641)   fentaNYL (PF) (SUBLIMAZE) injection 25 mcg ( Intravenous Not Given 10/30/19 2116)   ondansetron (ZOFRAN) injection 4 mg (4 mg Intravenous Given 10/30/19 1937)   metoclopramide (REGLAN) injection 5 mg (5 mg Intravenous Given 10/30/19 2006)         Emergency Department Course:  Started on     1758 I spoke with Dr. Mark Caballero of the Thoracic Surgery service regarding patient's presentation, findings, and plan of care.          Impression & Plan          CMS Diagnoses:         Medical Decision Making:  Presents with mechanical fall down approximately 5 stairs.  Patient has pain to the left shoulder and chest region.  Considering partial trauma activation was made although he did not fall far enough and had stable vital signs.  CT head neck and chest revealed multiple rib fractures and hemothorax and concern for C3 spinous process and T2 suggested further imaging.  Reconstruction of the CT chest was obtained showing findings as above.  Spoke with Dr. Mark You regarding the chest trauma and chest tube was placed by myself with 850 cc of blood.  Patient tolerated procedure well and was moved around to see if any ongoing bleeding would occur and it did not.  Spoke with the hospitalist service as well as Dr. Esposito from trauma surgery.  There is discussion back and forth as to who would be primary admitting  and ultimately went to Dr. Esposito given the multisystem trauma.  Spoke with Dr. Tanner regarding the C3 spinous process fracture and that T2 fracture and that the radiology is requesting advanced imaging.  Given his other injuries spelt he can go to the floor to manage his chest tube and hemothorax and images could be obtained in a future date.  Cervical collar spine was instructed to be taken off.  Patient felt much better with this.  Patient has no numbness or weakness in his lower extremities and good dorsiflexion plantarflexion of his great toes.  Patient was grateful for his care.  Discussed the seriousness of the injury with family.  Patient was given IV fluids.    Diagnosis:    ICD-10-CM    1. Closed fracture of multiple ribs of left side, initial encounter S22.42XA ABO/Rh type and screen     Glucose by meter     Glucose by meter   2. Hemothorax on left J94.2    3. Closed head injury, initial encounter S09.90XA    4. Other closed nondisplaced fracture of third cervical vertebra, initial encounter (H) S12.291A    5. Closed fracture of second thoracic vertebra, unspecified fracture morphology, initial encounter (H) S22.029A        Total critical  Care time excluding procedures 45 minutes    Disposition:  Admit to ICU    Discharge Medications:  Current Discharge Medication List        I saw the patient in conjunction with the resident did my own history and physical and reviewed the images and care and agree with the above plan and treatment.    MD Job Dickerson  10/30/2019    EMERGENCY DEPARTMENT       Estrada Sebastian MD  11/02/19 3504

## 2019-10-30 NOTE — ED NOTES
Bed: ED13  Expected date:   Expected time:   Means of arrival:   Comments:  Sai Alexander Fall down stairs C-collar and backboard  79 m

## 2019-10-31 ENCOUNTER — APPOINTMENT (OUTPATIENT)
Dept: GENERAL RADIOLOGY | Facility: CLINIC | Age: 79
DRG: 963 | End: 2019-10-31
Attending: PHYSICIAN ASSISTANT
Payer: COMMERCIAL

## 2019-10-31 ENCOUNTER — APPOINTMENT (OUTPATIENT)
Dept: ULTRASOUND IMAGING | Facility: CLINIC | Age: 79
DRG: 963 | End: 2019-10-31
Attending: INTERNAL MEDICINE
Payer: COMMERCIAL

## 2019-10-31 PROBLEM — J94.2 HEMOTHORAX ON LEFT: Status: ACTIVE | Noted: 2019-10-31

## 2019-10-31 LAB
ALBUMIN UR-MCNC: 30 MG/DL
ANION GAP SERPL CALCULATED.3IONS-SCNC: 9 MMOL/L (ref 3–14)
APPEARANCE UR: ABNORMAL
BILIRUB UR QL STRIP: NEGATIVE
BUN SERPL-MCNC: 25 MG/DL (ref 7–30)
CALCIUM SERPL-MCNC: 8.7 MG/DL (ref 8.5–10.1)
CHLORIDE SERPL-SCNC: 102 MMOL/L (ref 94–109)
CO2 SERPL-SCNC: 28 MMOL/L (ref 20–32)
COLOR UR AUTO: ABNORMAL
CREAT SERPL-MCNC: 2.37 MG/DL (ref 0.66–1.25)
ERYTHROCYTE [DISTWIDTH] IN BLOOD BY AUTOMATED COUNT: 15.8 % (ref 10–15)
GFR SERPL CREATININE-BSD FRML MDRD: 25 ML/MIN/{1.73_M2}
GLUCOSE BLDC GLUCOMTR-MCNC: 118 MG/DL (ref 70–99)
GLUCOSE BLDC GLUCOMTR-MCNC: 197 MG/DL (ref 70–99)
GLUCOSE BLDC GLUCOMTR-MCNC: 288 MG/DL (ref 70–99)
GLUCOSE BLDC GLUCOMTR-MCNC: 344 MG/DL (ref 70–99)
GLUCOSE BLDC GLUCOMTR-MCNC: 352 MG/DL (ref 70–99)
GLUCOSE BLDC GLUCOMTR-MCNC: 367 MG/DL (ref 70–99)
GLUCOSE SERPL-MCNC: 199 MG/DL (ref 70–99)
GLUCOSE UR STRIP-MCNC: 300 MG/DL
HBA1C MFR BLD: 7.7 % (ref 0–5.6)
HCT VFR BLD AUTO: 32.1 % (ref 40–53)
HGB BLD-MCNC: 10.4 G/DL (ref 13.3–17.7)
HGB UR QL STRIP: ABNORMAL
HYALINE CASTS #/AREA URNS LPF: 7 /LPF (ref 0–2)
INR PPP: 1.37 (ref 0.86–1.14)
KETONES UR STRIP-MCNC: 10 MG/DL
LACTATE BLD-SCNC: 2.5 MMOL/L (ref 0.7–2)
LACTATE BLD-SCNC: 2.7 MMOL/L (ref 0.7–2)
LEUKOCYTE ESTERASE UR QL STRIP: ABNORMAL
MCH RBC QN AUTO: 29.1 PG (ref 26.5–33)
MCHC RBC AUTO-ENTMCNC: 32.4 G/DL (ref 31.5–36.5)
MCV RBC AUTO: 90 FL (ref 78–100)
MRSA DNA SPEC QL NAA+PROBE: NEGATIVE
MUCOUS THREADS #/AREA URNS LPF: PRESENT /LPF
NITRATE UR QL: NEGATIVE
PH UR STRIP: 5 PH (ref 5–7)
PLATELET # BLD AUTO: 213 10E9/L (ref 150–450)
POTASSIUM SERPL-SCNC: 4.3 MMOL/L (ref 3.4–5.3)
PROCALCITONIN SERPL-MCNC: 4.69 NG/ML
RBC # BLD AUTO: 3.58 10E12/L (ref 4.4–5.9)
RBC #/AREA URNS AUTO: >182 /HPF (ref 0–2)
SODIUM SERPL-SCNC: 139 MMOL/L (ref 133–144)
SOURCE: ABNORMAL
SP GR UR STRIP: 1.02 (ref 1–1.03)
SPECIMEN SOURCE: NORMAL
UROBILINOGEN UR STRIP-MCNC: NORMAL MG/DL (ref 0–2)
WBC # BLD AUTO: 26.9 10E9/L (ref 4–11)
WBC #/AREA URNS AUTO: 2 /HPF (ref 0–5)

## 2019-10-31 PROCEDURE — 87040 BLOOD CULTURE FOR BACTERIA: CPT | Performed by: INTERNAL MEDICINE

## 2019-10-31 PROCEDURE — 25000128 H RX IP 250 OP 636: Performed by: INTERNAL MEDICINE

## 2019-10-31 PROCEDURE — 85027 COMPLETE CBC AUTOMATED: CPT | Performed by: INTERNAL MEDICINE

## 2019-10-31 PROCEDURE — 25000132 ZZH RX MED GY IP 250 OP 250 PS 637: Performed by: INTERNAL MEDICINE

## 2019-10-31 PROCEDURE — 12000047 ZZH R&B IMCU

## 2019-10-31 PROCEDURE — 80048 BASIC METABOLIC PNL TOTAL CA: CPT | Performed by: INTERNAL MEDICINE

## 2019-10-31 PROCEDURE — 36415 COLL VENOUS BLD VENIPUNCTURE: CPT | Performed by: INTERNAL MEDICINE

## 2019-10-31 PROCEDURE — 25000125 ZZHC RX 250: Performed by: INTERNAL MEDICINE

## 2019-10-31 PROCEDURE — 87641 MR-STAPH DNA AMP PROBE: CPT | Performed by: INTERNAL MEDICINE

## 2019-10-31 PROCEDURE — 93010 ELECTROCARDIOGRAM REPORT: CPT | Performed by: INTERNAL MEDICINE

## 2019-10-31 PROCEDURE — 99231 SBSQ HOSP IP/OBS SF/LOW 25: CPT | Performed by: SURGERY

## 2019-10-31 PROCEDURE — 85610 PROTHROMBIN TIME: CPT | Performed by: INTERNAL MEDICINE

## 2019-10-31 PROCEDURE — 36415 COLL VENOUS BLD VENIPUNCTURE: CPT | Performed by: SURGERY

## 2019-10-31 PROCEDURE — 84145 PROCALCITONIN (PCT): CPT | Performed by: INTERNAL MEDICINE

## 2019-10-31 PROCEDURE — 25800030 ZZH RX IP 258 OP 636: Performed by: INTERNAL MEDICINE

## 2019-10-31 PROCEDURE — 81001 URINALYSIS AUTO W/SCOPE: CPT | Performed by: INTERNAL MEDICINE

## 2019-10-31 PROCEDURE — 83605 ASSAY OF LACTIC ACID: CPT | Performed by: INTERNAL MEDICINE

## 2019-10-31 PROCEDURE — 99233 SBSQ HOSP IP/OBS HIGH 50: CPT | Performed by: INTERNAL MEDICINE

## 2019-10-31 PROCEDURE — 12000000 ZZH R&B MED SURG/OB

## 2019-10-31 PROCEDURE — 0W9B30Z DRAINAGE OF LEFT PLEURAL CAVITY WITH DRAINAGE DEVICE, PERCUTANEOUS APPROACH: ICD-10-PCS | Performed by: EMERGENCY MEDICINE

## 2019-10-31 PROCEDURE — 93005 ELECTROCARDIOGRAM TRACING: CPT

## 2019-10-31 PROCEDURE — 25000128 H RX IP 250 OP 636: Performed by: EMERGENCY MEDICINE

## 2019-10-31 PROCEDURE — 71045 X-RAY EXAM CHEST 1 VIEW: CPT

## 2019-10-31 PROCEDURE — 83036 HEMOGLOBIN GLYCOSYLATED A1C: CPT | Performed by: SURGERY

## 2019-10-31 PROCEDURE — 76770 US EXAM ABDO BACK WALL COMP: CPT

## 2019-10-31 PROCEDURE — 00000146 ZZHCL STATISTIC GLUCOSE BY METER IP

## 2019-10-31 PROCEDURE — 25000131 ZZH RX MED GY IP 250 OP 636 PS 637: Performed by: INTERNAL MEDICINE

## 2019-10-31 PROCEDURE — 87640 STAPH A DNA AMP PROBE: CPT | Performed by: INTERNAL MEDICINE

## 2019-10-31 RX ORDER — GINSENG 100 MG
CAPSULE ORAL 3 TIMES DAILY PRN
Status: DISCONTINUED | OUTPATIENT
Start: 2019-10-31 | End: 2019-11-10 | Stop reason: HOSPADM

## 2019-10-31 RX ORDER — POTASSIUM CHLORIDE 29.8 MG/ML
20 INJECTION INTRAVENOUS
Status: DISCONTINUED | OUTPATIENT
Start: 2019-10-31 | End: 2019-11-10 | Stop reason: HOSPADM

## 2019-10-31 RX ORDER — POTASSIUM CL/LIDO/0.9 % NACL 10MEQ/0.1L
10 INTRAVENOUS SOLUTION, PIGGYBACK (ML) INTRAVENOUS
Status: DISCONTINUED | OUTPATIENT
Start: 2019-10-31 | End: 2019-11-10 | Stop reason: HOSPADM

## 2019-10-31 RX ORDER — LIDOCAINE 40 MG/G
CREAM TOPICAL
Status: DISCONTINUED | OUTPATIENT
Start: 2019-10-31 | End: 2019-11-10 | Stop reason: HOSPADM

## 2019-10-31 RX ORDER — NALOXONE HYDROCHLORIDE 0.4 MG/ML
.1-.4 INJECTION, SOLUTION INTRAMUSCULAR; INTRAVENOUS; SUBCUTANEOUS
Status: DISCONTINUED | OUTPATIENT
Start: 2019-10-31 | End: 2019-11-10 | Stop reason: HOSPADM

## 2019-10-31 RX ORDER — POTASSIUM CHLORIDE 1500 MG/1
20-40 TABLET, EXTENDED RELEASE ORAL
Status: DISCONTINUED | OUTPATIENT
Start: 2019-10-31 | End: 2019-11-10 | Stop reason: HOSPADM

## 2019-10-31 RX ORDER — NITROGLYCERIN 0.4 MG/1
0.4 TABLET SUBLINGUAL EVERY 5 MIN PRN
Status: DISCONTINUED | OUTPATIENT
Start: 2019-10-31 | End: 2019-11-07

## 2019-10-31 RX ORDER — AMOXICILLIN 250 MG
2 CAPSULE ORAL 2 TIMES DAILY PRN
Status: DISCONTINUED | OUTPATIENT
Start: 2019-10-31 | End: 2019-11-10 | Stop reason: HOSPADM

## 2019-10-31 RX ORDER — VERAPAMIL HYDROCHLORIDE 240 MG/1
240 TABLET, FILM COATED, EXTENDED RELEASE ORAL AT BEDTIME
Status: DISCONTINUED | OUTPATIENT
Start: 2019-10-31 | End: 2019-11-02

## 2019-10-31 RX ORDER — PIPERACILLIN SODIUM, TAZOBACTAM SODIUM 3; .375 G/15ML; G/15ML
3.38 INJECTION, POWDER, LYOPHILIZED, FOR SOLUTION INTRAVENOUS EVERY 6 HOURS
Status: COMPLETED | OUTPATIENT
Start: 2019-10-31 | End: 2019-11-03

## 2019-10-31 RX ORDER — LORAZEPAM 2 MG/ML
.25-.5 INJECTION INTRAMUSCULAR EVERY 4 HOURS PRN
Status: DISCONTINUED | OUTPATIENT
Start: 2019-10-31 | End: 2019-11-07

## 2019-10-31 RX ORDER — SODIUM CHLORIDE 9 MG/ML
INJECTION, SOLUTION INTRAVENOUS CONTINUOUS
Status: DISCONTINUED | OUTPATIENT
Start: 2019-10-31 | End: 2019-11-05

## 2019-10-31 RX ORDER — HYDROMORPHONE HYDROCHLORIDE 1 MG/ML
0.2 INJECTION, SOLUTION INTRAMUSCULAR; INTRAVENOUS; SUBCUTANEOUS
Status: DISCONTINUED | OUTPATIENT
Start: 2019-10-31 | End: 2019-11-07

## 2019-10-31 RX ORDER — HYDRALAZINE HYDROCHLORIDE 20 MG/ML
10 INJECTION INTRAMUSCULAR; INTRAVENOUS EVERY 4 HOURS PRN
Status: DISCONTINUED | OUTPATIENT
Start: 2019-10-31 | End: 2019-11-10 | Stop reason: HOSPADM

## 2019-10-31 RX ORDER — POTASSIUM CHLORIDE 7.45 MG/ML
10 INJECTION INTRAVENOUS
Status: DISCONTINUED | OUTPATIENT
Start: 2019-10-31 | End: 2019-11-10 | Stop reason: HOSPADM

## 2019-10-31 RX ORDER — LIDOCAINE 40 MG/G
CREAM TOPICAL
Status: DISCONTINUED | OUTPATIENT
Start: 2019-10-31 | End: 2019-10-31

## 2019-10-31 RX ORDER — TRIAMCINOLONE ACETONIDE 1 MG/G
OINTMENT TOPICAL DAILY PRN
Status: ON HOLD | COMMUNITY
End: 2020-05-11

## 2019-10-31 RX ORDER — AMOXICILLIN 250 MG
1 CAPSULE ORAL 2 TIMES DAILY PRN
Status: DISCONTINUED | OUTPATIENT
Start: 2019-10-31 | End: 2019-11-10 | Stop reason: HOSPADM

## 2019-10-31 RX ORDER — NICOTINE POLACRILEX 4 MG
15-30 LOZENGE BUCCAL
Status: DISCONTINUED | OUTPATIENT
Start: 2019-10-31 | End: 2019-11-10 | Stop reason: HOSPADM

## 2019-10-31 RX ORDER — DEXTROSE MONOHYDRATE 25 G/50ML
25-50 INJECTION, SOLUTION INTRAVENOUS
Status: DISCONTINUED | OUTPATIENT
Start: 2019-10-31 | End: 2019-11-10 | Stop reason: HOSPADM

## 2019-10-31 RX ORDER — ONDANSETRON 4 MG/1
4 TABLET, ORALLY DISINTEGRATING ORAL EVERY 6 HOURS PRN
Status: DISCONTINUED | OUTPATIENT
Start: 2019-10-31 | End: 2019-11-10 | Stop reason: HOSPADM

## 2019-10-31 RX ORDER — POTASSIUM CHLORIDE 1.5 G/1.58G
20-40 POWDER, FOR SOLUTION ORAL
Status: DISCONTINUED | OUTPATIENT
Start: 2019-10-31 | End: 2019-11-10 | Stop reason: HOSPADM

## 2019-10-31 RX ORDER — ONDANSETRON 2 MG/ML
4 INJECTION INTRAMUSCULAR; INTRAVENOUS EVERY 6 HOURS PRN
Status: DISCONTINUED | OUTPATIENT
Start: 2019-10-31 | End: 2019-11-10 | Stop reason: HOSPADM

## 2019-10-31 RX ADMIN — VERAPAMIL HYDROCHLORIDE 240 MG: 240 TABLET, FILM COATED, EXTENDED RELEASE ORAL at 23:18

## 2019-10-31 RX ADMIN — CLONIDINE HYDROCHLORIDE 0.3 MG: 0.1 TABLET ORAL at 12:31

## 2019-10-31 RX ADMIN — CLONIDINE HYDROCHLORIDE 0.3 MG: 0.1 TABLET ORAL at 22:23

## 2019-10-31 RX ADMIN — SIMVASTATIN 10 MG: 10 TABLET, FILM COATED ORAL at 23:18

## 2019-10-31 RX ADMIN — HYDROMORPHONE HYDROCHLORIDE 0.2 MG: 1 INJECTION, SOLUTION INTRAMUSCULAR; INTRAVENOUS; SUBCUTANEOUS at 18:40

## 2019-10-31 RX ADMIN — ONDANSETRON 4 MG: 2 INJECTION INTRAMUSCULAR; INTRAVENOUS at 16:26

## 2019-10-31 RX ADMIN — SODIUM CHLORIDE 250 ML: 9 INJECTION, SOLUTION INTRAVENOUS at 21:29

## 2019-10-31 RX ADMIN — INSULIN ASPART 5 UNITS: 100 INJECTION, SOLUTION INTRAVENOUS; SUBCUTANEOUS at 23:27

## 2019-10-31 RX ADMIN — PROPRANOLOL HYDROCHLORIDE 60 MG: 20 TABLET ORAL at 12:33

## 2019-10-31 RX ADMIN — LORAZEPAM 0.5 MG: 2 INJECTION, SOLUTION INTRAMUSCULAR; INTRAVENOUS at 00:07

## 2019-10-31 RX ADMIN — INSULIN ASPART 5 UNITS: 100 INJECTION, SOLUTION INTRAVENOUS; SUBCUTANEOUS at 18:48

## 2019-10-31 RX ADMIN — INSULIN GLARGINE 8 UNITS: 100 INJECTION, SOLUTION SUBCUTANEOUS at 18:41

## 2019-10-31 RX ADMIN — PROPRANOLOL HYDROCHLORIDE 60 MG: 20 TABLET ORAL at 23:18

## 2019-10-31 RX ADMIN — SODIUM CHLORIDE: 9 INJECTION, SOLUTION INTRAVENOUS at 16:38

## 2019-10-31 RX ADMIN — HYDROMORPHONE HYDROCHLORIDE 0.5 MG: 1 INJECTION, SOLUTION INTRAMUSCULAR; INTRAVENOUS; SUBCUTANEOUS at 08:06

## 2019-10-31 RX ADMIN — PIPERACILLIN SODIUM AND TAZOBACTAM SODIUM 3.38 G: 3; .375 INJECTION, POWDER, LYOPHILIZED, FOR SOLUTION INTRAVENOUS at 22:23

## 2019-10-31 RX ADMIN — HYDROMORPHONE HYDROCHLORIDE 0.2 MG: 1 INJECTION, SOLUTION INTRAMUSCULAR; INTRAVENOUS; SUBCUTANEOUS at 15:05

## 2019-10-31 RX ADMIN — INSULIN ASPART 5 UNITS: 100 INJECTION, SOLUTION INTRAVENOUS; SUBCUTANEOUS at 14:43

## 2019-10-31 RX ADMIN — BACITRACIN: 500 OINTMENT TOPICAL at 12:41

## 2019-10-31 RX ADMIN — HYDROMORPHONE HYDROCHLORIDE 0.2 MG: 1 INJECTION, SOLUTION INTRAMUSCULAR; INTRAVENOUS; SUBCUTANEOUS at 23:19

## 2019-10-31 RX ADMIN — SODIUM CHLORIDE: 9 INJECTION, SOLUTION INTRAVENOUS at 10:10

## 2019-10-31 ASSESSMENT — VISUAL ACUITY
OU: NORMAL ACUITY
OU: NORMAL ACUITY

## 2019-10-31 ASSESSMENT — ACTIVITIES OF DAILY LIVING (ADL)
ADLS_ACUITY_SCORE: 17
ADLS_ACUITY_SCORE: 19
ADLS_ACUITY_SCORE: 19

## 2019-10-31 ASSESSMENT — MIFFLIN-ST. JEOR: SCORE: 1601.25

## 2019-10-31 NOTE — PROGRESS NOTES
[facility]    Hospitalist Progress Note    Date of Service (when I saw the patient): 10/31/2019    Assessment & Plan    Tc Garcia is a 79 year old male with a history of atrial fibrillation, DM type II, HTN who presented to the ED on 10/30/2019 after fall down the stairs sustaining a C3 spinous process fracture, fracture of T2, multiple left sided rib fractures and moderate left hemothorax     Multiple left sided rib fractures   Moderate left hemothorax   C3 spinous process fracture  Fracture of T2  Sustained after a fall down the stairs.  Seen on CT scans of the cervical spine and chest.  Of note CT scan of the head did not show any evidence of bleeding.  In the ED a chest tube was placed as well   - Defer to trauma service, they've ok'd ambulation with assistance  - ortho spine notes cervical and thoracic fracture stable and ok to mobilize as tolerated with restrictions based on other injuries, no bracing necessary  - daily CXR per CT surgery  - CT surgery managing chest tube, currently to suction  - daily hgb (12.2->10.4 10/31)     ABBIE  Creatinine on admission 10/30 at 1.09. [place] to 2.37 on 10/31. UOP  Sluggish, 90 ml 10/31. PTA on losartan-hydrochlorothiazide, lisinopril (?). No noted hypotensive episodes. ? If relative hypotension with fall and ACEI/ ARB on board  - check UA  - avoid nephrotoxins  - IV fluids  - renal US  - repeat labs in am  - NS bolus 500 ml x 1 10/31    Leukocytosis  Admit WBC at 25.6 10/30. Repeat on 10/31 at 26.9. he remains afebrile and hemodynamically stable.   - suspect reactive  - check procalcitonin  - hold abx for now    Atrial fibrillation   Normally anticoagulated on Eliquis.  EKG in the ED showed sinus bradycardia   - Holding Eliquis  - Restarted PTA verapamil 240 mg daily     DM type II   PTA on Metformin 500 mg BID, Actos 30 mg and insulin pump.  hgb A1C at 7.7% on admission 10/31  - Holding PTA meds and insulin pump   - Lantus 8 U at bedtime-> will need  titrate up once clear taking PO  - Moderate SSI   - Mod CHO diet     HTN   HLP   PTA Clonidine 0.3 mg BID, Propranolol 60 mg at HS, and Hyzaar losartan-hydrochlorothiazide 100- 25, lisinopril 40 mg daily. Also on lovastatin 20 mg at HS. Confirms was on both ACE I and ARB prior to admission  - hold Hyzaar, lisinopril.   - continue clonidine, propranolol  - hold statin for now    FEN (fluids, electrolytes and nutrition): IV fluids, advancing diet as tolerated  Discussed with nursing.  DVT Prophylaxis: Pneumatic Compression Devices  Code Status: Full Code    Disposition: Expected discharge in 3-4 days pending improvement with injuries, ABBIE    Yahir Butler MD  [phone] (P)  Text Page    Interval History   Overnight events reviewed. Denies sob, CP in are of rib fx. No abdominal pain. Sluggish UOP    -Data reviewed today: I reviewed all new labs and imaging results over the last 24 hours. I personally reviewed the EKG tracing showing atrial fibrillation.    Physical Exam   Temp: 97.3  F (36.3  C) Temp src: Oral BP: 123/68 Pulse: 89 Heart Rate: 75 Resp: 20 SpO2: 93 % O2 Device: Nasal cannula Oxygen Delivery: 1 LPM  Vitals:    10/30/19 1517 10/30/19 2100 10/31/19 0000   Weight: 93 kg (205 lb) 88.5 kg (195 lb 1.7 oz) 88.5 kg (195 lb 1.7 oz)     Vital Signs with Ranges  Temp:  [97.3  F (36.3  C)-98.5  F (36.9  C)] 97.3  F (36.3  C)  Pulse:  [61-89] 89  Heart Rate:  [54-86] 75  Resp:  [0-26] 20  BP: (106-177)/() 123/68  SpO2:  [60 %-100 %] 93 %  I/O last 3 completed shifts:  In: 0   Out: 1170 [Urine:90; Drains:1080]    Constitutional: Alert, oriented, no acute distress  Respiratory: Lungs diminished with some crackles on L, otherwise clear  Cardiovascular: irregular rhythm, normal rate. No murmurs  GI: Soft, non-tender, non-disteneded, good bowel sounds  Skin/Integumen: No erythema, cyanosis or edema  Other:      Medications     sodium chloride 125 mL/hr at 10/31/19 1638       sodium chloride 0.9%  1,000 mL  Intravenous Once     cloNIDine  0.3 mg Oral BID     insulin aspart  1-6 Units Subcutaneous Q4H     [START ON 11/1/2019] insulin glargine  8 Units Subcutaneous QAM AC     propranolol  60 mg Oral BID     simvastatin  10 mg Oral At Bedtime     verapamil ER  240 mg Oral At Bedtime       Data   Recent Labs   Lab 10/31/19  0744 10/30/19  1729   WBC 26.9* 25.6*   HGB 10.4* 12.2*   MCV 90 90    262   INR 1.37* 1.40*    140   POTASSIUM 4.3 3.2*   CHLORIDE 102 104   CO2 28 31   BUN 25 15   CR 2.37* 1.09   ANIONGAP 9 5   LLOYD 8.7 8.8   * 117*   ALBUMIN  --  3.6   PROTTOTAL  --  6.3*   BILITOTAL  --  0.6   ALKPHOS  --  73   ALT  --  30   AST  --  44       Recent Results (from the past 24 hour(s))   CT Cervical Spine w/o Contrast   Result Value    Radiologist flags Acute cervical spine fracture (AA)    Narrative    CT CERVICAL SPINE WITHOUT CONTRAST   10/30/2019 5:20 PM     HISTORY: Trauma     TECHNIQUE: Axial images of the cervical spine were obtained without  intravenous contrast. Multiplanar reformations were performed.   Radiation dose for this scan was reduced using automated exposure  control, adjustment of the mA and/or kV according to patient size, or  iterative reconstruction technique.    COMPARISON: None.    FINDINGS: There is an acute fracture that involves the spinous process  of C3 (series 6 image 38-40, series 5 image 37). There is a subtle  cortical irregularity involving the right aspect of the C7 vertebral  body which is indeterminate for a nondisplaced fracture. There are  acute fractures involving right anterolateral aspect of the T2  inferior endplate extending into the intervertebral disc space,  possibly also involving the superior endplate of T2 but incompletely  evaluated. Multiple left-sided upper rib fractures. Contusion of the  left upper pulmonary lobe with associated pleural fluid/hemothorax.  Small volume scattered air noted within the anterior and posterior  soft tissues of the  upper chest/back.    There is a background of multilevel degenerative disc disease and  facet arthropathy of the cervical spine. Multilevel uncovertebral  arthropathy is also noted. No high-grade spinal stenosis is  identified. Varying degrees of multilevel neural foraminal narrowing  is seen, most pronounced on the left at C4-C5. Bilateral carotid  bifurcation atherosclerotic calcifications are noted.      Impression    IMPRESSION:  1. Acute C3 spinous process fracture.  2. Acute fractures involving the right anterolateral aspect of the T2  vertebral body extending into the disc space. Recommend dedicated  thoracic spine CT.  3. Subtle cortical irregularity involving the right aspect of the C7  vertebral body, indeterminate for subtle acute fracture.  4. Multiple acute left-sided upper rib fractures with left-sided  pulmonary contusion and hemothorax.    [Critical Result: Acute cervical spine fracture]    Finding was identified on 10/30/2019 5:38 PM.     Dr. Sebastian was contacted by me on 10/30/2019 5:52 PM and verbalized  understanding of the critical result.     ALLYSON HOU MD   CT Head w/o Contrast    Narrative    CT SCAN OF THE HEAD WITHOUT CONTRAST   10/30/2019 5:21 PM     HISTORY: Trauma    TECHNIQUE: Axial images of the head and coronal reformations without  IV contrast material. Radiation dose for this scan was reduced using  automated exposure control, adjustment of the mA and/or kV according  to patient size, or iterative reconstruction technique.    COMPARISON: None.    FINDINGS: There is no evidence of intracranial hemorrhage, mass, acute  infarct or other acute abnormality. Generalized atrophy of the brain.  There is low attenuation in the white matter of the cerebral  hemispheres consistent with sequelae of small vessel ischemic disease.  Ventricular size is within normal limits without evidence of  hydrocephalus. There is a round low-attenuation lesion measuring  approximately 4 mm (series 2 image  17) interposed between the anterior  aspect of the midbrain and the superior aspect of the basilar artery,  essentially in the upper aspect of the interpeduncular cistern. This  lesion appears to demonstrate fat attenuation internally, and may  represent a small lipoma. No associated mass effect.    Bilateral lens replacements. Polypoid mucosal thickening in the  alveolar recess of the left maxillary sinus. Mastoid and middle ear  cavities appear clear. Advanced bilateral temporomandibular joint  osteoarthritis. The bony calvarium and bones of the skull base appear  intact.     On the  image, there appears to be asymmetric opacification  projecting over the left lung apex, incompletely evaluated.      Impression    IMPRESSION:  1. No CT findings of acute traumatic intracranial abnormality.  2. Generalized brain parenchymal volume loss. Scattered  hypoattenuation in the cerebral white matter, likely related to small  vessel ischemic disease.  3. Other chronic-appearing intracranial findings, as detailed in the  body of the report.  4. On the  image, note is made of asymmetric opacification of the  left lung apex, incompletely evaluated. Recommend dedicated chest  radiographs for further characterization.    ALLYSON HOU MD   Chest CT w/o contrast    Narrative    CT CHEST WITHOUT CONTRAST 10/30/2019 5:21 PM     HISTORY: Trauma, left chest/shoulder pain.    COMPARISON: None.    TECHNIQUE: Volumetric helical acquisition of CT images of the chest  from the clavicles to the kidneys were acquired without IV contrast.  Radiation dose for this scan was reduced using automated exposure  control, adjustment of the mA and/or kV according to patient size, or  iterative reconstruction technique.    FINDINGS:  Posterolateral fourth, fifth, sixth, seventh, eighth, and  ninth rib fractures noted on the left. Rib 7 is displaced, and  overlapping. Ribs 8 and 9 are displaced as well as 5, 4, and 6. There  is a moderate  hemothorax. Trace probable pneumothorax inferiorly.  Pulmonary contusion and/or atelectasis noted on the left. Minimal  right pleural effusion and associated atelectasis. Medial rib  fractures of 8th and 9th ribs as well as a posterior fracture of the  sixth rib. There are extensive coronary vascular calcifications and/or  stents consistent with coronary artery disease. There are mild  atherosclerotic changes of the visualized aorta and its branches.  There is no evidence of aortic aneurysm. No acute findings in the  visualized upper abdomen.      Impression    IMPRESSION: Multiple left-sided rib fractures, ribs 6, 8, and 9 are  fractured in two places. Moderate left hemothorax and trace pleural  air on the left.    ALLYSON MARTINEZ MD   CT Thoracic Spine w Contrast    Narrative    CT THORACIC SPINE WITHOUT CONTRAST   10/30/2019 6:32 PM     HISTORY: Thoracic spine fracture, traumatic. Trauma, multiple rib  fractures, CT cervical spine shows C3 spinous process fracture and  T2-T3 fracture into spine.     TECHNIQUE: Axial images of the thoracic spine were obtained without  intravenous contrast. Multiplanar reformations were performed.  Radiation dose for this scan was reduced using automated exposure  control, adjustment of the mA and/or kV according to patient size, or  iterative reconstruction technique.    COMPARISON: Cervical spine CT same date.    FINDINGS: Subtle nondisplaced acute fracture involving the inferior  endplate of T2 (series 15 image 16). The fracture line extends into  the region of the T2-T3 intervertebral disc space. Slight undulation  of the superior endplates of T3 and T4, age-indeterminate. The  remaining vertebral body heights appear maintained. There are acute  nondisplaced fractures involving the left T5-T9 transverse processes.  Alignment of the thoracic spine is within normal limits. Bridging  ossification along the right anterolateral aspect of the upper to mid  thoracic spine, most  pronounced from T3 through T11, consistent with  diffuse idiopathic skeletal hyperostosis. There is a background of  mild multilevel degenerative disc disease. No high-grade spinal  stenosis is seen.    Multiple acute left-sided rib fractures are also noted. Partially  visualized left pulmonary contusions with left hemothorax and trace  left pleural air, better characterized on chest CT of same date.  Please see chest CT of same day for details regarding extraspinal  findings. Small right pleural effusion also noted with basilar  atelectasis. Coronary artery and aortic atherosclerotic calcifications  are noted.      Impression    IMPRESSION:  1. Subtle nondisplaced acute fracture involving the T2 inferior  endplate extending into the T2-T3 disc space.  2. Age-indeterminate mild contour deformities involving the T2 and T3  superior endplates.  3. Acute left T5-T9 transverse process fractures.  4. Multiple acute left-sided rib fractures, left pulmonary contusion,  and left hemopneumothorax, better characterized on chest CT of same  date.    ALLYSON HOU MD   XR Chest Port 1 View    Narrative    CHEST ONE VIEW SUPINE 10/30/2019 7:06 PM     HISTORY: Chest tube placement    COMPARISON: None.      Impression    IMPRESSION: Chest tube placed on the left with tip near the apex.  Multiple rib fractures noted on the left. Right lung clear.    ALLYSON MARTINEZ MD   XR Chest Port 1 View    Narrative    CHEST PORTABLE ONE VIEW   10/31/2019 9:55 AM     HISTORY: Left hemothorax, multiple left rib fractures.    COMPARISON: 10/30/2019.      Impression    IMPRESSION: Left chest tube is stable. Tiny left apical pneumothorax.  Multiple left rib fractures. Patchy opacities at the left mid to  inferior lung appears stable and could be some atelectasis. Right lung  is clear. Stable enlarged cardiac silhouette.    ROSA PAGE MD

## 2019-10-31 NOTE — PROGRESS NOTES
Thoracic Surgery:    Full consult note to follow    Patient see, interviewed and examined  79-year old with a fib, DM who had traumatic fall down half flight of stairs and broke multiple left ribs, two of which are fractured in more than 2 places plus moderate hemothorax. Chest Tube placed in ED last night with return of 900 ml blood.    Patient not SOB, pain well-managed except with movement   No recent illness, no hx PNA, on Eliquis for A fib    Hgb: 12.2 at 1729 yesterday and 10.4 this morning  INR: 1.37  Creat: 2.37    CT in place: no active bloody output currently- no air leak  No flail chest on exam, minimal ecchymosis    Plan: Daily PCXR (including today)  IS, flutter valve  OK to ambulate from our standpoint-- with assistance  CT to suction  Daily hgb  CT site care    Estrella Giraldo PA-C with Dr. Warren [facility]  Cell [phone]

## 2019-10-31 NOTE — PROGRESS NOTES
Orthopaedic Spine Imaging Review   Full consult from Ortho team to follow  CT of the cervical spine from 10/30/18 shows an acute C3 spinous process fracture. No other acute traumatic findings. There is multilevel facet degeneration.    CT scan of the thoracic spine from [id number]/[id number] shows an oblique fracture of the T2 vertebral body and transverse process fractures on the left from T5 to T9. There are multiple left sided rib fractures.   Impression:  80 yo man with C3 spinous process, T2 vertebral body transverse process fractures on the left at T5-9 and multiple rib fractures.    Recommendations:  Both the cervical and thoracic fractures are stable.  Mobilize as tolerated with restrictions based on the patient's other injuries.  The spine fractures do not need to be braced.

## 2019-10-31 NOTE — PROGRESS NOTES
"Trauma Surgery Progress Note         Assessment and Plan:   Assessment:   79-year-old gentleman status post fall downstairs with multiple left-sided rib fractures, hemopneumothorax, multiple spinous process fractures      Plan:   Overall actually looks quite good.  Respiratory effort is good.  Thoracic surgery has seen the patient and will continue to manage chest tube.  Spine surgery is seen the patient and recommended conservative treatment for the spinous process fractures.  He is cleared for activity from a spine surgery perspective.  From a general surgery perspective I believe the patient is cleared for transfer to the floor.  We will await a hospitalist consultation as they will assume primary management of the patient with the assistance of the above-mentioned specialties was transferred out of the unit.  Okay to start a diet and ambulate from our perspective.            Interval History:   Patient sitting up in chair in the ICU.  States that his pain is well controlled.  Denies shortness of breath.  Pain at chest tube site.  Slight nausea with water.  Otherwise reports passing gas.  Denies abdominal pain.  Was able to assist in process of getting out of bed.            Physical Exam:   Blood pressure 131/74, pulse 89, temperature 97.6  F (36.4  C), resp. rate 20, height 1.778 m (5' 10\"), weight 88.5 kg (195 lb 1.7 oz), SpO2 92 %.   Lungs with diminished breath sounds in the left base decreased effort.  It sounds however appear equal anteriorly  Cardiovascular is regular rate and rhythm  Abdomen:   soft, non-distended, non-tender and no masses palpatednormal bowel sounds      Neurologic exam is GCS 15, nonfocal, moves all extremities          Data:     WBC   Date Value Ref Range Status   10/31/2019 26.9 (H) 4.0 - 11.0 10e9/L Final   ]       Brian Garsia MD     "

## 2019-10-31 NOTE — PHARMACY-ADMISSION MEDICATION HISTORY
Pharmacy Medication History  Admission medication history interview status for the 10/30/2019  admission is complete. See EPIC admission navigator for prior to admission medications      Medication history sources: Patient and his wife.  Next AM got med list from Dr. Senthil Moya's Office at [facility] ([phone]) and reviewed sure script records.     Medication history source reliability: Moderate, had a hand written med list w/ them.  Adherence assessment: Good     Significant changes made to the medication list:  Added Eliquis, Losartan/HCTZ, discont ASA,      Also it appears pt taking minocycline BID (not every day) and propanolol at bedtime (not BID) per MD records.     Medication reconciliation completed by provider prior to medication history? Yes     Time spent in this activity: 20min      Prior to Admission medications    Medication Sig Last Dose Taking? Auth Provider   apixaban ANTICOAGULANT (ELIQUIS) 5 MG tablet Take 5 mg by mouth 2 times daily 10/30/2019 at Unknown time Yes Unknown, Entered By History   cloNIDine (CATAPRES) 0.3 MG tablet Take 0.3 mg by mouth 2 times daily 10/30/2019 at Unknown time Yes Unknown, Entered By History   glucagon 1 MG kit 1 mg as needed for low blood sugar prn Yes Unknown, Entered By History   INSULIN PUMP - OUTPATIENT Novolog Insulin  BASAL RATES and times:  All day: 0.95 units/hour    CARB RATIO: 1 unit per 15 grams  Correction Factor (Sensitivity): 55mg/dL  BLOOD GLUCOSE TARGET and times:  12   AM (midnight): 100 - 140  5:30     AM:  100 - 120  10   PM:  100 - 140  Active Insulin Time:  5 hours     (Per Rx, pt uses 50-60 units/day)  Yes Unknown, Entered By History   LISINOPRIL 40 MG PO TABS 1 TABLET DAILY 10/30/2019 at Unknown time Yes Tc Palacios MD   losartan-hydrochlorothiazide (HYZAAR) 100-25 MG tablet Take 1 tablet by mouth daily 10/30/2019 at Unknown time Yes Unknown, Entered By History   LOVASTATIN 20 MG PO TABS 1 TABLET DAILY AT DINNER  10/29/2019 at Unknown time Yes Tc Palacios MD   metFORMIN (GLUCOPHAGE) 500 MG tablet Take 500 mg by mouth 2 times daily (with meals) 10/30/2019 at Unknown time Yes Unknown, Entered By History   minocycline (MINOCIN/DYNACIN) 100 MG capsule Take 100 mg by mouth 2 times daily  10/30/2019 at Unknown time Yes Unknown, Entered By History   pioglitazone (ACTOS) 30 MG tablet Take 30 mg by mouth daily 10/30/2019 at Unknown time Yes Unknown, Entered By History   propranolol (INDERAL) 60 MG tablet Take 60 mg by mouth At Bedtime  10/30/2019 at Unknown time Yes Unknown, Entered By History   triamcinolone (KENALOG) 0.1 % external ointment Apply topically daily as needed for irritation prn Yes Unknown, Entered By History   VERAPAMIL HCL### 240 MG OR TBCR 1 TABLET DAILY 10/30/2019 at Unknown time Yes Tc Palacios MD   ACCU-CHEK COMPACT TEST DRUM VI STRP check blood sugar three times a day and PRN   Tc Palacios MD Tiffany M. Reinitz, PharmD

## 2019-10-31 NOTE — PROVIDER NOTIFICATION
Spoke with Dr. Butler re: patient having prolonged QT last evening and today. Ok to given zofran for nausea. MD notified of urine output of 190 ml for 9 hrs.  MD will be to floor to round on patient.

## 2019-10-31 NOTE — CONSULTS
Thoracic Surgery Consult Note:    Tc Garcia  1940   [id number]    Date of Admission: 10/30/2019  3:14 PM  Date of Consult: 10/31/2019     CC: Hemothorax on left [J94.2]  Closed head injury, initial encounter [S09.90XA]  Closed fracture of multiple ribs of left side, initial encounter [S22.42XA]  Other closed nondisplaced fracture of third cervical vertebra, initial encounter (H) [S12.291A]  Closed fracture of second thoracic vertebra, unspecified fracture morphology, initial encounter (H) [S22.029A]    Referring Provider: Dr. Yahir Butler    Consulting Thoracic Surgeon: Dr. Warren Rodriguez    ASSESSMENT/PLAN:   1) Traumatic hemopneumothorax with multiple left-sided rib fractures after falling down multiple stairs:     *Left chest tube in good position, drained 900 ml hemothorax initially and now only 30 ml on day shift-- keep to -20 cm H20 suction, monitor character and quantity of output, daily PCXR   *Pulmonary toilet-- IS and Flutter valve ordered, OK to ambulate from our standpoint   *Pain control-- patient actually states his pain is mild unless he moves but he is willing to move and understands importance of moving and pulm toilet for lung health   *Hgb-- dropped from 12.7 last night to 10.4 this morning, monitor-- looks unlikely there is ongoing intrathoracic bleeding as his CXR from this morning looks stable and minimal CT output since initial placement and drainage of hemothorax in ED. Holding Eliquis.      History of the Present Illness: Patient is an 79 year old male with past medical history of DM, htn and atrial fibrillation on Eliquis who tripped and fell down a half flight of stairs at home yesterday afternoon. He felt immediate onset of left anterior chest pain so was transported to the ED via EMS. His fall was unwitnessed-- he did hit his head but did not lose consciousness. Denies onset of dyspnea both then and now. No pre-emptive dizziness nor lightheadedness. No recent illnesses. In the  ED, CXR and CT chest demonstrated multiple left rib fractures (5th-9th, some of which are displaced), and moderate left hemothorax. A chest tube was placed with return of 900 ml of blood. Patient states his left anterior chest pain/pressure was greatly relieved with placement of the chest tube. CXR then confirmed adequate re-expansion of the left lung. Of note, patient also diagnosed with C3 spinous process fracture (stable per Ortho) and T2 fracture (stable per Ortho).  Thoracic Surgery Consult was requested for evaluation and management of left traumatic hemopneumothorax.     Tc quit smoking cigarettes 20 years ago. No recent PNA nor other falls. Lives with his wife.     ROS: A 12-point review of systems was performed and negative except as noted in the HPI above.     Past Medical History:  Past Medical History:   Diagnosis Date     Diabetic PROTEINURIA      Mixed hyperlipidemia      Personal history of colonic polyps 1972    gets colonoscopy every five years, due in      Rosacea      Type II or unspecified type diabetes mellitus without mention of complication, not stated as uncontrolled      Unspecified essential hypertension         Past Surgical History:  Past Surgical History:   Procedure Laterality Date     HC REMOVE TONSILS/ADENOIDS,<10 Y/O      T & A <11y.o.       Family History:  Family History   Problem Relation Age of Onset     Family History Negative Mother          age 71     Family History Negative Father          age 70     Diabetes Brother         alive age 77     Diabetes Brother         alive age 76     Family History Negative Brother              Family History Negative Brother              Diabetes Sister         alive age74     Family History Negative Sister          age 86     Heart Disease Sister              Family History Negative Sister              Family History Negative Sister               "Diabetes Sister      Diabetes Sister      Diabetes Brother      Diabetes Brother        Medications:  No current outpatient medications on file.       S: Sitting upright in bed- fully conversant and appropriate. Denies SOB, cough, severe left chest pain.  Hurts to move but anxious to get up and use bathroom.     O: /74   Pulse 89   Temp 97.6  F (36.4  C)   Resp 20   Ht 1.778 m (5' 10\")   Wt 88.5 kg (195 lb 1.7 oz)   SpO2 92%   BMI 27.99 kg/m   on 3 L O2 nasal cannula.    Exam:  General:  Tc is awake, alert, and cooperative.  NAD.  HEENT: normocephalic, atraumatic, no hematoma nor scrapes, trachea midline, no cervial lymphadenopathy  Respiratory: normal effort  Left chest: minimal ecchymosis around left hip, no hematoma, no flail chest with cough/deep breath. Minimal movement of fractured ends of ribs with deep breathing. No crepitus.  Chest Tube Site: appropriately taped, chest tube without kinks, draining serosanguinous fluid, 100 ml on night shift, no bleeding, no air leak, no hematoma    Imaging:  Recent Results (from the past 48 hour(s))   CT Cervical Spine w/o Contrast   Result Value    Radiologist flags Acute cervical spine fracture (AA)    Narrative    CT CERVICAL SPINE WITHOUT CONTRAST   10/30/2019 5:20 PM     HISTORY: Trauma     TECHNIQUE: Axial images of the cervical spine were obtained without  intravenous contrast. Multiplanar reformations were performed.   Radiation dose for this scan was reduced using automated exposure  control, adjustment of the mA and/or kV according to patient size, or  iterative reconstruction technique.    COMPARISON: None.    FINDINGS: There is an acute fracture that involves the spinous process  of C3 (series 6 image 38-40, series 5 image 37). There is a subtle  cortical irregularity involving the right aspect of the C7 vertebral  body which is indeterminate for a nondisplaced fracture. There are  acute fractures involving right anterolateral aspect of the " T2  inferior endplate extending into the intervertebral disc space,  possibly also involving the superior endplate of T2 but incompletely  evaluated. Multiple left-sided upper rib fractures. Contusion of the  left upper pulmonary lobe with associated pleural fluid/hemothorax.  Small volume scattered air noted within the anterior and posterior  soft tissues of the upper chest/back.    There is a background of multilevel degenerative disc disease and  facet arthropathy of the cervical spine. Multilevel uncovertebral  arthropathy is also noted. No high-grade spinal stenosis is  identified. Varying degrees of multilevel neural foraminal narrowing  is seen, most pronounced on the left at C4-C5. Bilateral carotid  bifurcation atherosclerotic calcifications are noted.      Impression    IMPRESSION:  1. Acute C3 spinous process fracture.  2. Acute fractures involving the right anterolateral aspect of the T2  vertebral body extending into the disc space. Recommend dedicated  thoracic spine CT.  3. Subtle cortical irregularity involving the right aspect of the C7  vertebral body, indeterminate for subtle acute fracture.  4. Multiple acute left-sided upper rib fractures with left-sided  pulmonary contusion and hemothorax.    [Critical Result: Acute cervical spine fracture]    Finding was identified on 10/30/2019 5:38 PM.     Dr. Sebastian was contacted by me on 10/30/2019 5:52 PM and verbalized  understanding of the critical result.     ALLYSON HOU MD   CT Head w/o Contrast    Narrative    CT SCAN OF THE HEAD WITHOUT CONTRAST   10/30/2019 5:21 PM     HISTORY: Trauma    TECHNIQUE: Axial images of the head and coronal reformations without  IV contrast material. Radiation dose for this scan was reduced using  automated exposure control, adjustment of the mA and/or kV according  to patient size, or iterative reconstruction technique.    COMPARISON: None.    FINDINGS: There is no evidence of intracranial hemorrhage, mass,  acute  infarct or other acute abnormality. Generalized atrophy of the brain.  There is low attenuation in the white matter of the cerebral  hemispheres consistent with sequelae of small vessel ischemic disease.  Ventricular size is within normal limits without evidence of  hydrocephalus. There is a round low-attenuation lesion measuring  approximately 4 mm (series 2 image 17) interposed between the anterior  aspect of the midbrain and the superior aspect of the basilar artery,  essentially in the upper aspect of the interpeduncular cistern. This  lesion appears to demonstrate fat attenuation internally, and may  represent a small lipoma. No associated mass effect.    Bilateral lens replacements. Polypoid mucosal thickening in the  alveolar recess of the left maxillary sinus. Mastoid and middle ear  cavities appear clear. Advanced bilateral temporomandibular joint  osteoarthritis. The bony calvarium and bones of the skull base appear  intact.     On the  image, there appears to be asymmetric opacification  projecting over the left lung apex, incompletely evaluated.      Impression    IMPRESSION:  1. No CT findings of acute traumatic intracranial abnormality.  2. Generalized brain parenchymal volume loss. Scattered  hypoattenuation in the cerebral white matter, likely related to small  vessel ischemic disease.  3. Other chronic-appearing intracranial findings, as detailed in the  body of the report.  4. On the  image, note is made of asymmetric opacification of the  left lung apex, incompletely evaluated. Recommend dedicated chest  radiographs for further characterization.    ALLYSON HOU MD   Chest CT w/o contrast    Narrative    CT CHEST WITHOUT CONTRAST 10/30/2019 5:21 PM     HISTORY: Trauma, left chest/shoulder pain.    COMPARISON: None.    TECHNIQUE: Volumetric helical acquisition of CT images of the chest  from the clavicles to the kidneys were acquired without IV contrast.  Radiation dose for this  scan was reduced using automated exposure  control, adjustment of the mA and/or kV according to patient size, or  iterative reconstruction technique.    FINDINGS:  Posterolateral fourth, fifth, sixth, seventh, eighth, and  ninth rib fractures noted on the left. Rib 7 is displaced, and  overlapping. Ribs 8 and 9 are displaced as well as 5, 4, and 6. There  is a moderate hemothorax. Trace probable pneumothorax inferiorly.  Pulmonary contusion and/or atelectasis noted on the left. Minimal  right pleural effusion and associated atelectasis. Medial rib  fractures of 8th and 9th ribs as well as a posterior fracture of the  sixth rib. There are extensive coronary vascular calcifications and/or  stents consistent with coronary artery disease. There are mild  atherosclerotic changes of the visualized aorta and its branches.  There is no evidence of aortic aneurysm. No acute findings in the  visualized upper abdomen.      Impression    IMPRESSION: Multiple left-sided rib fractures, ribs 6, 8, and 9 are  fractured in two places. Moderate left hemothorax and trace pleural  air on the left.    ALLYSON MARTINEZ MD   CT Thoracic Spine w Contrast    Narrative    CT THORACIC SPINE WITHOUT CONTRAST   10/30/2019 6:32 PM     HISTORY: Thoracic spine fracture, traumatic. Trauma, multiple rib  fractures, CT cervical spine shows C3 spinous process fracture and  T2-T3 fracture into spine.     TECHNIQUE: Axial images of the thoracic spine were obtained without  intravenous contrast. Multiplanar reformations were performed.  Radiation dose for this scan was reduced using automated exposure  control, adjustment of the mA and/or kV according to patient size, or  iterative reconstruction technique.    COMPARISON: Cervical spine CT same date.    FINDINGS: Subtle nondisplaced acute fracture involving the inferior  endplate of T2 (series 15 image 16). The fracture line extends into  the region of the T2-T3 intervertebral disc space. Slight  undulation  of the superior endplates of T3 and T4, age-indeterminate. The  remaining vertebral body heights appear maintained. There are acute  nondisplaced fractures involving the left T5-T9 transverse processes.  Alignment of the thoracic spine is within normal limits. Bridging  ossification along the right anterolateral aspect of the upper to mid  thoracic spine, most pronounced from T3 through T11, consistent with  diffuse idiopathic skeletal hyperostosis. There is a background of  mild multilevel degenerative disc disease. No high-grade spinal  stenosis is seen.    Multiple acute left-sided rib fractures are also noted. Partially  visualized left pulmonary contusions with left hemothorax and trace  left pleural air, better characterized on chest CT of same date.  Please see chest CT of same day for details regarding extraspinal  findings. Small right pleural effusion also noted with basilar  atelectasis. Coronary artery and aortic atherosclerotic calcifications  are noted.      Impression    IMPRESSION:  1. Subtle nondisplaced acute fracture involving the T2 inferior  endplate extending into the T2-T3 disc space.  2. Age-indeterminate mild contour deformities involving the T2 and T3  superior endplates.  3. Acute left T5-T9 transverse process fractures.  4. Multiple acute left-sided rib fractures, left pulmonary contusion,  and left hemopneumothorax, better characterized on chest CT of same  date.    ALLYSON HOU MD   XR Chest Port 1 View    Narrative    CHEST ONE VIEW SUPINE 10/30/2019 7:06 PM     HISTORY: Chest tube placement    COMPARISON: None.      Impression    IMPRESSION: Chest tube placed on the left with tip near the apex.  Multiple rib fractures noted on the left. Right lung clear.    ALLYSON MARTINEZ MD     Radiology Review: all CXR and CT imaging personally reviewed. CT chest demonstrates rib fractures left 4th-9th ribs with some displacement. CXR confirms good re-expansion of left lung and good  positioning of left chest tube. No residual hemothorax visible. Right lung clear.     Discussed the above plan of care with patient, Dr. Rodriguez and RN today. Orders left.  We will continue to follow with you.  Thank you for allowing us to participate in the care of this patient and please call if there are further questions or concerns.    Time dedicated to Consultation: 30  minutes were spent at bedside, floor and unit with greater than 50% of the time spent on patient counseling and education, radiology review and coordination of care.    Estrella Giraldo PA-C with Dr. Warren Rodriguez  [facility]  Cell [phone]

## 2019-10-31 NOTE — PROVIDER NOTIFICATION
Contacted Dr. Garsia requesting patient orders. [facility] for thoracic surgery and respiratory therapy consults and for incentive spirometry.

## 2019-10-31 NOTE — PROVIDER NOTIFICATION
Brief update:    Paged re: nausea    Added low dose IV ativan given prolonged QT reported in ER  Repeat EKG in AM    Héctor Herron MD  12:05 AM

## 2019-10-31 NOTE — PROVIDER NOTIFICATION
Dr. Butler paged re: elevation in creatinine. Pt currently NPO and with no IV maintenance. IV fluid orders entered.

## 2019-10-31 NOTE — CONSULTS
[facility]    Orthopedic Consultation    Tc Garcia MRN# [id number]   Age: 79 year old YOB: 1940     Date of Admission:  10/30/2019    Reason for consult: C3 spinous fracture       Requesting physician: Dr. Butler       Level of consult: One-time consult to assist in determining a diagnosis and to recommend an appropriate treatment plan           Assessment and Plan:   Assessment:   C3 spinous process fracture  Previous T2 vertebral body fracture  Left T5 to T9 transverse process fractures      Plan:   Conservative management  Mobilize as tolerated with respect to other consults restrictions (no formal restrictions per Ortho Spine)  Pain medication as needed           Chief Complaint:   Fall with spinous process fractures         History of Present Illness:   This patient is a 79 year old male who presents with the following condition requiring a hospital admission:    Per chart review: presented to the ED on 10/30/2019 after fall down the stairs sustaining a C3 spinous process fracture, fracture of T2, multiple left sided rib fractures and moderate left hemothorax.  He was taking Eliquis secondary to A. Fib but this has been held since yesterday.           Past Medical History:     Past Medical History:   Diagnosis Date     Diabetic PROTEINURIA 2003     Mixed hyperlipidemia 2003     Personal history of colonic polyps 1972    gets colonoscopy every five years, due in 2006     Rosacea 1989     Type II or unspecified type diabetes mellitus without mention of complication, not stated as uncontrolled 1999     Unspecified essential hypertension 1994             Past Surgical History:     Past Surgical History:   Procedure Laterality Date     HC REMOVE TONSILS/ADENOIDS,<10 Y/O      T & A <11y.o.             Social History:     Social History     Tobacco Use     Smoking status: Former Smoker     Packs/day: 0.20     Years: 40.00     Pack years: 8.00     Types: Cigarettes     Last attempt  to quit: 2008     Years since quittin.8     Smokeless tobacco: Never Used     Tobacco comment: occasional   Substance Use Topics     Alcohol use: Yes     Alcohol/week: 35.0 standard drinks     Comment: occasional             Family History:     Family History   Problem Relation Age of Onset     Family History Negative Mother          age 71     Family History Negative Father          age 70     Diabetes Brother         alive age 77     Diabetes Brother         alive age 76     Family History Negative Brother              Family History Negative Brother              Diabetes Sister         alive age74     Family History Negative Sister          age 86     Heart Disease Sister              Family History Negative Sister              Family History Negative Sister              Diabetes Sister      Diabetes Sister      Diabetes Brother      Diabetes Brother              Immunizations:     VACCINE/DOSE   Diptheria   DPT   DTAP   HBIG   Hepatitis A   Hepatitis B   HIB   Influenza   Measles   Meningococcal   MMR   Mumps   Pneumococcal   Polio   Rubella   Small Pox   TDAP   Varicella   Zoster             Allergies:     Allergies   Allergen Reactions     No Known Drug Allergies              Medications:     Current Facility-Administered Medications   Medication     0.9% sodium chloride BOLUS     bacitracin ointment     cloNIDine (CATAPRES) tablet 0.3 mg     glucose gel 15-30 g    Or     dextrose 50 % injection 25-50 mL    Or     glucagon injection 1 mg     HYDROmorphone (PF) (DILAUDID) injection 0.2 mg     insulin aspart (NovoLOG) inj (RAPID ACTING)     [START ON 2019] insulin glargine (LANTUS PEN) injection 8 Units     LORazepam (ATIVAN) injection 0.25-0.5 mg     magnesium hydroxide (MILK OF MAGNESIA) suspension 30 mL     naloxone (NARCAN) injection 0.1-0.4 mg     ondansetron (ZOFRAN-ODT) ODT tab 4 mg    Or     ondansetron (ZOFRAN) injection 4 mg      potassium chloride (KLOR-CON) Packet 20-40 mEq     potassium chloride 10 mEq in 100 mL intermittent infusion with 10 mg lidocaine     potassium chloride 10 mEq in 100 mL sterile water intermittent infusion (premix)     potassium chloride 20 mEq in 50 mL intermittent infusion     potassium chloride ER (K-DUR/KLOR-CON M) CR tablet 20-40 mEq     propranolol (INDERAL) tablet 60 mg     senna-docusate (SENOKOT-S/PERICOLACE) 8.6-50 MG per tablet 1 tablet    Or     senna-docusate (SENOKOT-S/PERICOLACE) 8.6-50 MG per tablet 2 tablet     simvastatin (ZOCOR) tablet 10 mg     sodium chloride 0.9% infusion     verapamil ER (CALAN-SR) CR tablet 240 mg             Review of Systems:   No ROS          Physical Exam:   All vitals have been reviewed  Patient Vitals for the past 24 hrs:   BP Temp Temp src Pulse Heart Rate Resp SpO2 Height Weight   10/31/19 0825 (!) 146/78 -- -- -- 86 -- 97 % -- --   10/31/19 0815 (!) 142/77 -- -- -- 81 -- -- -- --   10/31/19 0800 -- 98.5  F (36.9  C) Oral -- 79 20 97 % -- --   10/31/19 0600 (!) 177/126 -- -- 89 70 20 91 % -- --   10/31/19 0500 (!) 143/86 -- -- 75 73 14 98 % -- --   10/31/19 0400 (!) 143/75 -- -- 69 66 15 100 % -- --   10/31/19 0300 116/69 -- -- 66 64 12 100 % -- --   10/31/19 0200 125/73 97.8  F (36.6  C) Oral 65 65 15 100 % -- --   10/31/19 0100 115/73 -- -- 63 63 18 100 % -- --   10/31/19 0000 (!) 143/89 -- -- 71 72 23 100 % -- 88.5 kg (195 lb 1.7 oz)   10/30/19 2300 112/66 -- -- 64 66 17 98 % -- --   10/30/19 2200 121/66 -- -- 61 61 14 97 % -- --   10/30/19 2100 106/71 97.6  F (36.4  C) Oral 62 61 12 93 % -- 88.5 kg (195 lb 1.7 oz)   10/30/19 2032 -- -- -- -- 54 11 (!) 60 % -- --   10/30/19 2015 113/70 -- -- 63 59 11 100 % -- --   10/30/19 2000 128/80 -- -- 78 76 25 99 % -- --   10/30/19 1945 111/72 -- -- 67 66 (!) 0 100 % -- --   10/30/19 1930 124/76 -- -- 67 67 26 99 % -- --   10/30/19 1915 (!) 129/105 -- -- 68 67 8 100 % -- --   10/30/19 1900 127/82 -- -- 68 70 -- 95 % -- --  "  10/30/19 1845 (!) 164/89 -- -- 67 69 15 100 % -- --   10/30/19 1830 125/82 -- -- 68 71 9 91 % -- --   10/30/19 1827 132/77 -- -- 66 -- -- -- -- --   10/30/19 1824 130/76 -- -- 68 72 19 96 % -- --   10/30/19 1800 (!) 146/90 -- -- 62 -- -- 94 % -- --   10/30/19 1645 (!) 117/94 -- -- 56 -- -- 94 % -- --   10/30/19 1517 (!) 151/116 97.6  F (36.4  C) Oral 52 -- 18 97 % 1.778 m (5' 10\") 93 kg (205 lb)       Intake/Output Summary (Last 24 hours) at 10/31/2019 1126  Last data filed at 10/31/2019 0930  Gross per 24 hour   Intake 0 ml   Output 1170 ml   Net -1170 ml             Data:   All laboratory data reviewed  Results for orders placed or performed during the hospital encounter of 10/30/19   CT Head w/o Contrast    Narrative    CT SCAN OF THE HEAD WITHOUT CONTRAST   10/30/2019 5:21 PM     HISTORY: Trauma    TECHNIQUE: Axial images of the head and coronal reformations without  IV contrast material. Radiation dose for this scan was reduced using  automated exposure control, adjustment of the mA and/or kV according  to patient size, or iterative reconstruction technique.    COMPARISON: None.    FINDINGS: There is no evidence of intracranial hemorrhage, mass, acute  infarct or other acute abnormality. Generalized atrophy of the brain.  There is low attenuation in the white matter of the cerebral  hemispheres consistent with sequelae of small vessel ischemic disease.  Ventricular size is within normal limits without evidence of  hydrocephalus. There is a round low-attenuation lesion measuring  approximately 4 mm (series 2 image 17) interposed between the anterior  aspect of the midbrain and the superior aspect of the basilar artery,  essentially in the upper aspect of the interpeduncular cistern. This  lesion appears to demonstrate fat attenuation internally, and may  represent a small lipoma. No associated mass effect.    Bilateral lens replacements. Polypoid mucosal thickening in the  alveolar recess of the left maxillary " sinus. Mastoid and middle ear  cavities appear clear. Advanced bilateral temporomandibular joint  osteoarthritis. The bony calvarium and bones of the skull base appear  intact.     On the  image, there appears to be asymmetric opacification  projecting over the left lung apex, incompletely evaluated.      Impression    IMPRESSION:  1. No CT findings of acute traumatic intracranial abnormality.  2. Generalized brain parenchymal volume loss. Scattered  hypoattenuation in the cerebral white matter, likely related to small  vessel ischemic disease.  3. Other chronic-appearing intracranial findings, as detailed in the  body of the report.  4. On the  image, note is made of asymmetric opacification of the  left lung apex, incompletely evaluated. Recommend dedicated chest  radiographs for further characterization.    ALLYSON HOU MD   CT Cervical Spine w/o Contrast   Result Value Ref Range    Radiologist flags Acute cervical spine fracture (AA)     Narrative    CT CERVICAL SPINE WITHOUT CONTRAST   10/30/2019 5:20 PM     HISTORY: Trauma     TECHNIQUE: Axial images of the cervical spine were obtained without  intravenous contrast. Multiplanar reformations were performed.   Radiation dose for this scan was reduced using automated exposure  control, adjustment of the mA and/or kV according to patient size, or  iterative reconstruction technique.    COMPARISON: None.    FINDINGS: There is an acute fracture that involves the spinous process  of C3 (series 6 image 38-40, series 5 image 37). There is a subtle  cortical irregularity involving the right aspect of the C7 vertebral  body which is indeterminate for a nondisplaced fracture. There are  acute fractures involving right anterolateral aspect of the T2  inferior endplate extending into the intervertebral disc space,  possibly also involving the superior endplate of T2 but incompletely  evaluated. Multiple left-sided upper rib fractures. Contusion of the  left  upper pulmonary lobe with associated pleural fluid/hemothorax.  Small volume scattered air noted within the anterior and posterior  soft tissues of the upper chest/back.    There is a background of multilevel degenerative disc disease and  facet arthropathy of the cervical spine. Multilevel uncovertebral  arthropathy is also noted. No high-grade spinal stenosis is  identified. Varying degrees of multilevel neural foraminal narrowing  is seen, most pronounced on the left at C4-C5. Bilateral carotid  bifurcation atherosclerotic calcifications are noted.      Impression    IMPRESSION:  1. Acute C3 spinous process fracture.  2. Acute fractures involving the right anterolateral aspect of the T2  vertebral body extending into the disc space. Recommend dedicated  thoracic spine CT.  3. Subtle cortical irregularity involving the right aspect of the C7  vertebral body, indeterminate for subtle acute fracture.  4. Multiple acute left-sided upper rib fractures with left-sided  pulmonary contusion and hemothorax.    [Critical Result: Acute cervical spine fracture]    Finding was identified on 10/30/2019 5:38 PM.     Dr. Sebastian was contacted by me on 10/30/2019 5:52 PM and verbalized  understanding of the critical result.     ALLYSON HOU MD   Chest CT w/o contrast    Narrative    CT CHEST WITHOUT CONTRAST 10/30/2019 5:21 PM     HISTORY: Trauma, left chest/shoulder pain.    COMPARISON: None.    TECHNIQUE: Volumetric helical acquisition of CT images of the chest  from the clavicles to the kidneys were acquired without IV contrast.  Radiation dose for this scan was reduced using automated exposure  control, adjustment of the mA and/or kV according to patient size, or  iterative reconstruction technique.    FINDINGS:  Posterolateral fourth, fifth, sixth, seventh, eighth, and  ninth rib fractures noted on the left. Rib 7 is displaced, and  overlapping. Ribs 8 and 9 are displaced as well as 5, 4, and 6. There  is a moderate  hemothorax. Trace probable pneumothorax inferiorly.  Pulmonary contusion and/or atelectasis noted on the left. Minimal  right pleural effusion and associated atelectasis. Medial rib  fractures of 8th and 9th ribs as well as a posterior fracture of the  sixth rib. There are extensive coronary vascular calcifications and/or  stents consistent with coronary artery disease. There are mild  atherosclerotic changes of the visualized aorta and its branches.  There is no evidence of aortic aneurysm. No acute findings in the  visualized upper abdomen.      Impression    IMPRESSION: Multiple left-sided rib fractures, ribs 6, 8, and 9 are  fractured in two places. Moderate left hemothorax and trace pleural  air on the left.    ALLYSON MARTINEZ MD   CT Thoracic Spine w Contrast    Narrative    CT THORACIC SPINE WITHOUT CONTRAST   10/30/2019 6:32 PM     HISTORY: Thoracic spine fracture, traumatic. Trauma, multiple rib  fractures, CT cervical spine shows C3 spinous process fracture and  T2-T3 fracture into spine.     TECHNIQUE: Axial images of the thoracic spine were obtained without  intravenous contrast. Multiplanar reformations were performed.  Radiation dose for this scan was reduced using automated exposure  control, adjustment of the mA and/or kV according to patient size, or  iterative reconstruction technique.    COMPARISON: Cervical spine CT same date.    FINDINGS: Subtle nondisplaced acute fracture involving the inferior  endplate of T2 (series 15 image 16). The fracture line extends into  the region of the T2-T3 intervertebral disc space. Slight undulation  of the superior endplates of T3 and T4, age-indeterminate. The  remaining vertebral body heights appear maintained. There are acute  nondisplaced fractures involving the left T5-T9 transverse processes.  Alignment of the thoracic spine is within normal limits. Bridging  ossification along the right anterolateral aspect of the upper to mid  thoracic spine, most  pronounced from T3 through T11, consistent with  diffuse idiopathic skeletal hyperostosis. There is a background of  mild multilevel degenerative disc disease. No high-grade spinal  stenosis is seen.    Multiple acute left-sided rib fractures are also noted. Partially  visualized left pulmonary contusions with left hemothorax and trace  left pleural air, better characterized on chest CT of same date.  Please see chest CT of same day for details regarding extraspinal  findings. Small right pleural effusion also noted with basilar  atelectasis. Coronary artery and aortic atherosclerotic calcifications  are noted.      Impression    IMPRESSION:  1. Subtle nondisplaced acute fracture involving the T2 inferior  endplate extending into the T2-T3 disc space.  2. Age-indeterminate mild contour deformities involving the T2 and T3  superior endplates.  3. Acute left T5-T9 transverse process fractures.  4. Multiple acute left-sided rib fractures, left pulmonary contusion,  and left hemopneumothorax, better characterized on chest CT of same  date.    ALLYSON HOU MD   XR Chest Port 1 View    Narrative    CHEST ONE VIEW SUPINE 10/30/2019 7:06 PM     HISTORY: Chest tube placement    COMPARISON: None.      Impression    IMPRESSION: Chest tube placed on the left with tip near the apex.  Multiple rib fractures noted on the left. Right lung clear.    ALLYSON MARTINEZ MD   CBC with platelets differential   Result Value Ref Range    WBC 25.6 (H) 4.0 - 11.0 10e9/L    RBC Count 4.28 (L) 4.4 - 5.9 10e12/L    Hemoglobin 12.2 (L) 13.3 - 17.7 g/dL    Hematocrit 38.3 (L) 40.0 - 53.0 %    MCV 90 78 - 100 fl    MCH 28.5 26.5 - 33.0 pg    MCHC 31.9 31.5 - 36.5 g/dL    RDW 15.5 (H) 10.0 - 15.0 %    Platelet Count 262 150 - 450 10e9/L    Diff Method Automated Method     % Neutrophils 84.8 %    % Lymphocytes 7.6 %    % Monocytes 5.2 %    % Eosinophils 1.2 %    % Basophils 0.3 %    % Immature Granulocytes 0.9 %    Nucleated RBCs 0 0 /100    Absolute  Neutrophil 21.7 (H) 1.6 - 8.3 10e9/L    Absolute Lymphocytes 2.0 0.8 - 5.3 10e9/L    Absolute Monocytes 1.3 0.0 - 1.3 10e9/L    Absolute Eosinophils 0.3 0.0 - 0.7 10e9/L    Absolute Basophils 0.1 0.0 - 0.2 10e9/L    Abs Immature Granulocytes 0.2 0 - 0.4 10e9/L    Absolute Nucleated RBC 0.0    Comprehensive metabolic panel   Result Value Ref Range    Sodium 140 133 - 144 mmol/L    Potassium 3.2 (L) 3.4 - 5.3 mmol/L    Chloride 104 94 - 109 mmol/L    Carbon Dioxide 31 20 - 32 mmol/L    Anion Gap 5 3 - 14 mmol/L    Glucose 117 (H) 70 - 99 mg/dL    Urea Nitrogen 15 7 - 30 mg/dL    Creatinine 1.09 0.66 - 1.25 mg/dL    GFR Estimate 63 >60 mL/min/[1.73_m2]    GFR Estimate If Black 74 >60 mL/min/[1.73_m2]    Calcium 8.8 8.5 - 10.1 mg/dL    Bilirubin Total 0.6 0.2 - 1.3 mg/dL    Albumin 3.6 3.4 - 5.0 g/dL    Protein Total 6.3 (L) 6.8 - 8.8 g/dL    Alkaline Phosphatase 73 40 - 150 U/L    ALT 30 0 - 70 U/L    AST 44 0 - 45 U/L   INR   Result Value Ref Range    INR 1.40 (H) 0.86 - 1.14   Glucose by meter   Result Value Ref Range    Glucose 108 (H) 70 - 99 mg/dL   Hemoglobin A1c   Result Value Ref Range    Hemoglobin A1C 7.7 (H) 0 - 5.6 %   Glucose by meter   Result Value Ref Range    Glucose 119 (H) 70 - 99 mg/dL   Glucose by meter   Result Value Ref Range    Glucose 118 (H) 70 - 99 mg/dL   Basic metabolic panel   Result Value Ref Range    Sodium 139 133 - 144 mmol/L    Potassium 4.3 3.4 - 5.3 mmol/L    Chloride 102 94 - 109 mmol/L    Carbon Dioxide 28 20 - 32 mmol/L    Anion Gap 9 3 - 14 mmol/L    Glucose 199 (H) 70 - 99 mg/dL    Urea Nitrogen 25 7 - 30 mg/dL    Creatinine 2.37 (H) 0.66 - 1.25 mg/dL    GFR Estimate 25 (L) >60 mL/min/[1.73_m2]    GFR Estimate If Black 29 (L) >60 mL/min/[1.73_m2]    Calcium 8.7 8.5 - 10.1 mg/dL   CBC with platelets   Result Value Ref Range    WBC 26.9 (H) 4.0 - 11.0 10e9/L    RBC Count 3.58 (L) 4.4 - 5.9 10e12/L    Hemoglobin 10.4 (L) 13.3 - 17.7 g/dL    Hematocrit 32.1 (L) 40.0 - 53.0 %    MCV  90 78 - 100 fl    MCH 29.1 26.5 - 33.0 pg    MCHC 32.4 31.5 - 36.5 g/dL    RDW 15.8 (H) 10.0 - 15.0 %    Platelet Count 213 150 - 450 10e9/L   INR   Result Value Ref Range    INR 1.37 (H) 0.86 - 1.14   Glucose by meter   Result Value Ref Range    Glucose 197 (H) 70 - 99 mg/dL   EKG 12 lead   Result Value Ref Range    Interpretation ECG Click View Image link to view waveform and result    ABO/Rh type and screen   Result Value Ref Range    ABO O     RH(D) Pos     Antibody Screen Neg     Test Valid Only At [facility]        Specimen Expires 11/02/2019           Attestation:  I have reviewed today's vital signs, notes, medications, labs and imaging with Dr. Elan Tanner.  Amount of time performed on this consult: 15 minutes.    Rody Drake PA-C

## 2019-10-31 NOTE — PROGRESS NOTES
Brief ICU interdisciplinary rounds     Patient was reviewed and discussed on ICU interdisciplinary rounds.  The patient does not have any critical care needs currently.  Intensivist remains available for consult if needed and will continue to follow peripherally while patient remains in ICU.      Michell Price PA-C

## 2019-10-31 NOTE — PROVIDER NOTIFICATION
Called FORTUNATO Fine for orthopedics re: activity/moving patient.     1030: Received call from Rody. No restrictions on activity. Spine is stable, and no risk of incurring spinal injury with movement. Orthopedics not planning on surgery.

## 2019-10-31 NOTE — CONSULTS
[facility]  Consult Note - Hospitalist Service     Date of Admission:  10/30/2019  Consult Requested by: Trauma service  Reason for Consult: Medical management    Assessment & Plan   Tc Garcia is a 79 year old male with a history of atrial fibrillation, DM type II, HTN who presented to the ED on 10/30/2019 after fall down the stairs sustaining a C3 spinous process fracture, fracture of T2, multiple left sided rib fractures and moderate left hemothorax     Multiple left sided rib fractures   Moderate left hemothorax   C3 spinous process fracture  Fracture of T2  Sustained after a fall down the stairs.  Seen on CT scans of the cervical spine and chest.  Of note CT scan of the head did not show any evidence of bleeding.  In the ED a chest tube was placed as well   - Defer to trauma service     Atrial fibrillation   Normally anticoagulated on Eliquis.  EKG in the ED showed sinus bradycardia   - Holding Eliquis  - Restart PTA Verapamil once dosing confirmed     DM type II   PTA on Metformin 500 mg BID, Actos 30 mg and insulin pump.    - Holding PTA meds and insulin pump   - Lantus 20 U at bedtime  - Moderate SSI   - HgbA1c ordered     HTN   HLP   - PTA Clonidine, Propranolol and Hyzaar ordered  - PTA stat  - Patient also has lisinopril recorded as a PTA med while on an ARB.  This was not re-ordered      The patient's care was discussed with the Patient and Patient's Family.    Lam Triplett,   [facility]    ______________________________________________________________________    Chief Complaint   Fall     History is obtained from the patient    History of Present Illness   Tc Garcia is a 79 year old male with a history of atrial fibrillation, DM type II, HTN who presented to the ED on 10/30/2019 after fall down the stairs.  This evening the patient was walking down the stairs when he tripped on the steps and fell down hitting his head and left shoulder against the wall.  The  patient denies any LOC.  He does report left chest wall and left shoulder pain immediately after the fall.  He currently also reports some subjective SOB as he is unable to take a deep breath secondary to pain.  No chest pain or SOB prior to the fall.  Denies any focal weakness, numbness/tingling, fevers, chills, cough, abdominal pain, N/V/D or problems with urination.     Review of Systems   The 10 point Review of Systems is negative other than noted in the HPI    Past Medical History    I have reviewed this patient's medical history and updated it with pertinent information if needed.   Past Medical History:   Diagnosis Date     Diabetic PROTEINURIA      Mixed hyperlipidemia      Personal history of colonic polyps 1972    gets colonoscopy every five years, due in      Rosacea      Type II or unspecified type diabetes mellitus without mention of complication, not stated as uncontrolled      Unspecified essential hypertension        Past Surgical History   I have reviewed this patient's surgical history and updated it with pertinent information if needed.  Past Surgical History:   Procedure Laterality Date     HC REMOVE TONSILS/ADENOIDS,<10 Y/O      T & A <11y.o.       Social History   I have reviewed this patient's social history and updated it with pertinent information if needed.  Social History     Tobacco Use     Smoking status: Former Smoker     Packs/day: 0.20     Years: 40.00     Pack years: 8.00     Types: Cigarettes     Last attempt to quit: 2008     Years since quittin.8     Smokeless tobacco: Never Used     Tobacco comment: occasional   Substance Use Topics     Alcohol use: Yes     Alcohol/week: 35.0 standard drinks     Comment: occasional     Drug use: None       Family History   I have reviewed this patient's family history and updated it with pertinent information if needed.   Family History   Problem Relation Age of Onset     Family History Negative Mother           age 71     Family History Negative Father          age 70     Diabetes Brother         alive age 77     Diabetes Brother         alive age 76     Family History Negative Brother              Family History Negative Brother              Diabetes Sister         alive age74     Family History Negative Sister          age 86     Heart Disease Sister              Family History Negative Sister              Family History Negative Sister              Diabetes Sister      Diabetes Sister      Diabetes Brother      Diabetes Brother      Medications   Medications Prior to Admission   Medication Sig Dispense Refill Last Dose     apixaban ANTICOAGULANT (ELIQUIS) 5 MG tablet Take 5 mg by mouth 2 times daily   10/30/2019 at Unknown time     cloNIDine (CATAPRES) 0.3 MG tablet Take 0.3 mg by mouth 2 times daily   10/30/2019 at Unknown time     INSULIN PUMP - OUTPATIENT Novolog Insulin  BASAL RATES and times:  All day: 0.95 units/hour    CARB RATIO: 1 unit per 15 grams  Correction Factor (Sensitivity): 55mg/dL  BLOOD GLUCOSE TARGET and times:  12   AM (midnight): 100 - 140  5:30     AM:  100 - 120  10   PM:  100 - 140  Active Insulin Time:  5 hours        LISINOPRIL 40 MG PO TABS 1 TABLET DAILY 90 Tab 0 10/30/2019 at Unknown time     losartan-hydrochlorothiazide (HYZAAR) 100-25 MG tablet Take 1 tablet by mouth daily   10/30/2019 at Unknown time     LOVASTATIN 20 MG PO TABS 1 TABLET DAILY AT DINNER 90 Tab 0 10/29/2019 at Unknown time     metFORMIN (GLUCOPHAGE) 500 MG tablet Take 500 mg by mouth 2 times daily (with meals)   10/30/2019 at Unknown time     minocycline (MINOCIN/DYNACIN) 100 MG capsule Take 100 mg by mouth daily   10/30/2019 at Unknown time     pioglitazone (ACTOS) 30 MG tablet Take 30 mg by mouth daily   10/30/2019 at Unknown time     propranolol (INDERAL) 60 MG tablet Take 60 mg by mouth 2 times daily   10/30/2019 at Unknown time     VERAPAMIL HCL### 240  MG OR TBCR 1 TABLET DAILY 90 Tab 0 10/30/2019 at Unknown time     ACCU-CHEK COMPACT TEST DRUM VI STRP check blood sugar three times a day and  10        Allergies   Allergies   Allergen Reactions     No Known Drug Allergies        Physical Exam   Vital Signs: Temp: 97.6  F (36.4  C) Temp src: Oral BP: 127/82 Pulse: 68 Heart Rate: 70 Resp: 15 SpO2: 95 % O2 Device: Non-rebreather mask Oxygen Delivery: 12 LPM  Weight: 205 lbs 0 oz    General Appearance: Resting in bed.  Appears uncomfortable  Eyes: EOMI.  Normal conjunctiva  HEENT: Cervical collar in place.  NC/AT.  Normal conjunctiva  Respiratory: Clear to auscultation.  No respiratory distress  Cardiovascular: Chest tube in place.  RRR.  No obvious murmurs   GI: Bowel sounds present.  Non-tender   Skin: No obvious rashes.  No cyanosis  Musculoskeletal: No edema.  No calf tenderness  Neurologic: No focal deficits.  Unable to assess CN adequately with cervical collar in place  Psychiatric: Alert.  Pleasant affect     Data   I personally reviewed CT imaging as below   EKG:  Sinus bradycardia.  Rate 56 BPM.  No concerning ST or T wave changes to suggest acute ischemia     CT Cervical spine:   1. Acute C3 spinous process fracture.  2. Acute fractures involving the right anterolateral aspect of the T2  vertebral body extending into the disc space. Recommend dedicated  thoracic spine CT.  3. Subtle cortical irregularity involving the right aspect of the C7  vertebral body, indeterminate for subtle acute fracture.  4. Multiple acute left-sided upper rib fractures with left-sided  pulmonary contusion and hemothorax.    CT Head:   1. No CT findings of acute traumatic intracranial abnormality.  2. Generalized brain parenchymal volume loss. Scattered  hypoattenuation in the cerebral white matter, likely related to small  vessel ischemic disease.  3. Other chronic-appearing intracranial findings, as detailed in the  body of the report.  4. On the  image, note is made  of asymmetric opacification of the  left lung apex, incompletely evaluated. Recommend dedicated chest  radiographs for further characterization.    CT Chest:   Multiple left-sided rib fractures, ribs 6, 8, and 9 are  fractured in two places. Moderate left hemothorax and trace pleural  air on the left.

## 2019-11-01 ENCOUNTER — APPOINTMENT (OUTPATIENT)
Dept: GENERAL RADIOLOGY | Facility: CLINIC | Age: 79
DRG: 963 | End: 2019-11-01
Attending: INTERNAL MEDICINE
Payer: COMMERCIAL

## 2019-11-01 ENCOUNTER — APPOINTMENT (OUTPATIENT)
Dept: PHYSICAL THERAPY | Facility: CLINIC | Age: 79
DRG: 963 | End: 2019-11-01
Attending: INTERNAL MEDICINE
Payer: COMMERCIAL

## 2019-11-01 LAB
ANION GAP SERPL CALCULATED.3IONS-SCNC: 8 MMOL/L (ref 3–14)
BUN SERPL-MCNC: 47 MG/DL (ref 7–30)
CALCIUM SERPL-MCNC: 8.1 MG/DL (ref 8.5–10.1)
CHLORIDE SERPL-SCNC: 101 MMOL/L (ref 94–109)
CK SERPL-CCNC: 581 U/L (ref 30–300)
CO2 SERPL-SCNC: 25 MMOL/L (ref 20–32)
CREAT SERPL-MCNC: 2.99 MG/DL (ref 0.66–1.25)
ERYTHROCYTE [DISTWIDTH] IN BLOOD BY AUTOMATED COUNT: 15.8 % (ref 10–15)
GFR SERPL CREATININE-BSD FRML MDRD: 19 ML/MIN/{1.73_M2}
GLUCOSE BLDC GLUCOMTR-MCNC: 298 MG/DL (ref 70–99)
GLUCOSE BLDC GLUCOMTR-MCNC: 300 MG/DL (ref 70–99)
GLUCOSE BLDC GLUCOMTR-MCNC: 302 MG/DL (ref 70–99)
GLUCOSE BLDC GLUCOMTR-MCNC: 316 MG/DL (ref 70–99)
GLUCOSE BLDC GLUCOMTR-MCNC: 318 MG/DL (ref 70–99)
GLUCOSE BLDC GLUCOMTR-MCNC: 351 MG/DL (ref 70–99)
GLUCOSE SERPL-MCNC: 355 MG/DL (ref 70–99)
HCT VFR BLD AUTO: 26.6 % (ref 40–53)
HGB BLD-MCNC: 8.5 G/DL (ref 13.3–17.7)
HGB BLD-MCNC: 8.6 G/DL (ref 13.3–17.7)
MCH RBC QN AUTO: 28.7 PG (ref 26.5–33)
MCHC RBC AUTO-ENTMCNC: 32.3 G/DL (ref 31.5–36.5)
MCV RBC AUTO: 89 FL (ref 78–100)
PLATELET # BLD AUTO: 224 10E9/L (ref 150–450)
POTASSIUM SERPL-SCNC: 4.4 MMOL/L (ref 3.4–5.3)
RBC # BLD AUTO: 3 10E12/L (ref 4.4–5.9)
SODIUM SERPL-SCNC: 134 MMOL/L (ref 133–144)
WBC # BLD AUTO: 25.9 10E9/L (ref 4–11)

## 2019-11-01 PROCEDURE — 97530 THERAPEUTIC ACTIVITIES: CPT | Mod: GP | Performed by: PHYSICAL THERAPIST

## 2019-11-01 PROCEDURE — 25000131 ZZH RX MED GY IP 250 OP 636 PS 637: Performed by: INTERNAL MEDICINE

## 2019-11-01 PROCEDURE — 25800030 ZZH RX IP 258 OP 636: Performed by: INTERNAL MEDICINE

## 2019-11-01 PROCEDURE — 93010 ELECTROCARDIOGRAM REPORT: CPT | Performed by: INTERNAL MEDICINE

## 2019-11-01 PROCEDURE — 85027 COMPLETE CBC AUTOMATED: CPT | Performed by: INTERNAL MEDICINE

## 2019-11-01 PROCEDURE — 99233 SBSQ HOSP IP/OBS HIGH 50: CPT | Performed by: INTERNAL MEDICINE

## 2019-11-01 PROCEDURE — 12000047 ZZH R&B IMCU

## 2019-11-01 PROCEDURE — 97161 PT EVAL LOW COMPLEX 20 MIN: CPT | Mod: GP | Performed by: PHYSICAL THERAPIST

## 2019-11-01 PROCEDURE — 85018 HEMOGLOBIN: CPT | Performed by: INTERNAL MEDICINE

## 2019-11-01 PROCEDURE — 25000132 ZZH RX MED GY IP 250 OP 250 PS 637: Performed by: INTERNAL MEDICINE

## 2019-11-01 PROCEDURE — 25000128 H RX IP 250 OP 636: Performed by: INTERNAL MEDICINE

## 2019-11-01 PROCEDURE — 36415 COLL VENOUS BLD VENIPUNCTURE: CPT | Performed by: INTERNAL MEDICINE

## 2019-11-01 PROCEDURE — 12000000 ZZH R&B MED SURG/OB

## 2019-11-01 PROCEDURE — 82550 ASSAY OF CK (CPK): CPT | Performed by: INTERNAL MEDICINE

## 2019-11-01 PROCEDURE — 25000125 ZZHC RX 250: Performed by: INTERNAL MEDICINE

## 2019-11-01 PROCEDURE — 93005 ELECTROCARDIOGRAM TRACING: CPT

## 2019-11-01 PROCEDURE — 80048 BASIC METABOLIC PNL TOTAL CA: CPT | Performed by: INTERNAL MEDICINE

## 2019-11-01 PROCEDURE — 71045 X-RAY EXAM CHEST 1 VIEW: CPT

## 2019-11-01 PROCEDURE — 97116 GAIT TRAINING THERAPY: CPT | Mod: GP | Performed by: PHYSICAL THERAPIST

## 2019-11-01 PROCEDURE — 00000146 ZZHCL STATISTIC GLUCOSE BY METER IP

## 2019-11-01 RX ORDER — PROPRANOLOL HYDROCHLORIDE 20 MG/1
60 TABLET ORAL AT BEDTIME
Status: DISCONTINUED | OUTPATIENT
Start: 2019-11-01 | End: 2019-11-02

## 2019-11-01 RX ORDER — OXYCODONE HYDROCHLORIDE 5 MG/1
5-10 TABLET ORAL EVERY 4 HOURS PRN
Status: DISCONTINUED | OUTPATIENT
Start: 2019-11-01 | End: 2019-11-07

## 2019-11-01 RX ORDER — PIPERACILLIN SODIUM, TAZOBACTAM SODIUM 3; .375 G/15ML; G/15ML
3.38 INJECTION, POWDER, LYOPHILIZED, FOR SOLUTION INTRAVENOUS EVERY 6 HOURS
Status: DISCONTINUED | OUTPATIENT
Start: 2019-11-01 | End: 2019-11-01

## 2019-11-01 RX ADMIN — PIPERACILLIN SODIUM AND TAZOBACTAM SODIUM 3.38 G: 3; .375 INJECTION, POWDER, LYOPHILIZED, FOR SOLUTION INTRAVENOUS at 15:33

## 2019-11-01 RX ADMIN — ONDANSETRON 4 MG: 2 INJECTION INTRAMUSCULAR; INTRAVENOUS at 13:45

## 2019-11-01 RX ADMIN — INSULIN GLARGINE 10 UNITS: 100 INJECTION, SOLUTION SUBCUTANEOUS at 11:28

## 2019-11-01 RX ADMIN — INSULIN ASPART 5 UNITS: 100 INJECTION, SOLUTION INTRAVENOUS; SUBCUTANEOUS at 02:18

## 2019-11-01 RX ADMIN — CLONIDINE HYDROCHLORIDE 0.3 MG: 0.1 TABLET ORAL at 09:10

## 2019-11-01 RX ADMIN — ONDANSETRON 4 MG: 4 TABLET, ORALLY DISINTEGRATING ORAL at 06:31

## 2019-11-01 RX ADMIN — SODIUM CHLORIDE: 9 INJECTION, SOLUTION INTRAVENOUS at 03:34

## 2019-11-01 RX ADMIN — OXYCODONE HYDROCHLORIDE 5 MG: 5 TABLET ORAL at 09:10

## 2019-11-01 RX ADMIN — OXYCODONE HYDROCHLORIDE 5 MG: 5 TABLET ORAL at 18:22

## 2019-11-01 RX ADMIN — PIPERACILLIN SODIUM AND TAZOBACTAM SODIUM 3.38 G: 3; .375 INJECTION, POWDER, LYOPHILIZED, FOR SOLUTION INTRAVENOUS at 21:29

## 2019-11-01 RX ADMIN — BACITRACIN: 500 OINTMENT TOPICAL at 09:19

## 2019-11-01 RX ADMIN — INSULIN GLARGINE 10 UNITS: 100 INJECTION, SOLUTION SUBCUTANEOUS at 21:29

## 2019-11-01 RX ADMIN — OXYCODONE HYDROCHLORIDE 5 MG: 5 TABLET ORAL at 13:52

## 2019-11-01 RX ADMIN — SODIUM CHLORIDE: 9 INJECTION, SOLUTION INTRAVENOUS at 13:44

## 2019-11-01 RX ADMIN — INSULIN ASPART 4 UNITS: 100 INJECTION, SOLUTION INTRAVENOUS; SUBCUTANEOUS at 13:58

## 2019-11-01 RX ADMIN — SIMVASTATIN 10 MG: 10 TABLET, FILM COATED ORAL at 21:30

## 2019-11-01 RX ADMIN — HYDROMORPHONE HYDROCHLORIDE 0.2 MG: 1 INJECTION, SOLUTION INTRAMUSCULAR; INTRAVENOUS; SUBCUTANEOUS at 03:33

## 2019-11-01 RX ADMIN — INSULIN ASPART 4 UNITS: 100 INJECTION, SOLUTION INTRAVENOUS; SUBCUTANEOUS at 11:28

## 2019-11-01 RX ADMIN — VERAPAMIL HYDROCHLORIDE 240 MG: 240 TABLET, FILM COATED, EXTENDED RELEASE ORAL at 21:30

## 2019-11-01 RX ADMIN — INSULIN ASPART 4 UNITS: 100 INJECTION, SOLUTION INTRAVENOUS; SUBCUTANEOUS at 21:37

## 2019-11-01 RX ADMIN — PIPERACILLIN SODIUM AND TAZOBACTAM SODIUM 3.38 G: 3; .375 INJECTION, POWDER, LYOPHILIZED, FOR SOLUTION INTRAVENOUS at 09:11

## 2019-11-01 RX ADMIN — PIPERACILLIN SODIUM AND TAZOBACTAM SODIUM 3.38 G: 3; .375 INJECTION, POWDER, LYOPHILIZED, FOR SOLUTION INTRAVENOUS at 03:33

## 2019-11-01 RX ADMIN — INSULIN ASPART 4 UNITS: 100 INJECTION, SOLUTION INTRAVENOUS; SUBCUTANEOUS at 06:28

## 2019-11-01 RX ADMIN — CLONIDINE HYDROCHLORIDE 0.3 MG: 0.1 TABLET ORAL at 21:30

## 2019-11-01 RX ADMIN — PROPRANOLOL HYDROCHLORIDE 60 MG: 20 TABLET ORAL at 21:29

## 2019-11-01 RX ADMIN — INSULIN ASPART 4 UNITS: 100 INJECTION, SOLUTION INTRAVENOUS; SUBCUTANEOUS at 17:06

## 2019-11-01 ASSESSMENT — ACTIVITIES OF DAILY LIVING (ADL)
RETIRED_COMMUNICATION: 0-->UNDERSTANDS/COMMUNICATES WITHOUT DIFFICULTY
DRESS: 0-->INDEPENDENT
BATHING: 0-->INDEPENDENT
SWALLOWING: 0-->SWALLOWS FOODS/LIQUIDS WITHOUT DIFFICULTY
ADLS_ACUITY_SCORE: 15
TOILETING: 0-->INDEPENDENT
ADLS_ACUITY_SCORE: 17
RETIRED_EATING: 0-->INDEPENDENT
ADLS_ACUITY_SCORE: 17
ADLS_ACUITY_SCORE: 15

## 2019-11-01 NOTE — PROGRESS NOTES
Pt transferred to station 33 on cardiac monitor, w/c, on 2lNC, with flying squad and an aide. All belonging taken with pt. Pt. AO*4. Neuro intact. VSS. Tried to notify spouse of the transfer but wrong # in file. Will monitor.

## 2019-11-01 NOTE — PROGRESS NOTES
"Thoracic Surgery:  /68   Pulse 70   Temp 97.8  F (36.6  C) (Oral)   Resp 20   Ht 1.778 m (5' 10\")   Wt 88.5 kg (195 lb 1.7 oz)   SpO2 97%   BMI 27.99 kg/m   on 1 LPM NC  Hgb: 8.6 (was 12.2 on 10/30  and 10.4 on 10/31)  WBC: 25.9  CXR: stable- no PTX, multiple left rib fractures, CT in good position, small left pleural effusion/hemothorax  CT: serosang output-- looks like darker bloody output in chambers but more serous in tubing currently. 250 ml over 24 hours,. No air leak with weak cough    S: No change in breathing effort- painful- not dizzy nor lightheaded with standing. Discussed downtrending hgb and possibility of Chest CT to further evaluate.  O: Left flank with extensive ecchymosis  Left CT site-- two silk sutures intact, no hematoma, no bleeding  Left lateral chest painful to palpation, no flail chest  P: Possible CT chest to further characterize extent of left hemothorax and whether surgical intervention is indicated-- hgb has drifted down again to 8.6 this morning but could be dilutional (??) as Tc received >2 L IVF yesterday. Will d/w Dr. Rodriguez. Dr. Butler checking hgb twice more today.   On Marcel Giraldo PA-C with Dr. Warren Rodriguez  [facility]  Cell [phone]            "

## 2019-11-01 NOTE — PLAN OF CARE
A&O x4. VSS on 2L.  BS+, BM-, flatus+. Tolerating mod carb diet. Denies N/V. Dilaudid for pain. Up w/ 1 GB. Chest tubes to -20 suction. No crepitus, no air leak. Extensive bruising on backside left side.

## 2019-11-01 NOTE — PROGRESS NOTES
End of Shift Nursing Summary  10/31/19 0700 to 1930   Neuro:  Intact.  Pain: 2/10 and comfortable at this level. 8/10 with moving. PRN dilaudid given prior to mobilizing.  CV:  SR.   Pulm: Lungs diminished L > R. Coarseness in upper left lobe. Denies SOB. Encouraging IS.  GI/: Oliguria. 500 ml bolus given and fluids started. Encouraging pt fluid intact as tolerated.  Endocrine: BG elevated. sliding scale insulin and lantus given.   Skin: Multiple skin tears. Bruising on left posterior flank and side.   Fever: afebrile.  Lines/drips: 1 PIV.   Additional: Chest tube with ~ 100 mls bloody drainage. No air leak, no crepitus. Up assist 2 to chair x2 today. Pt with transfer orders to station 33.     *See EPIC flowsheet for full nursing assessment findings    Ericka Beckford RN

## 2019-11-01 NOTE — PROGRESS NOTES
Sepsis Evaluation Progress Note    I was called to see Tc Garcia due to a change in vital signs. He is not known to have an infection. Admitted under trauma service with hemithorax, s/p chest tube placement, multiple rib fracture,    Physical Exam   Vital Signs:  Temp: 97.5  F (36.4  C) Temp src: Oral BP: 123/67 Pulse: 71 Heart Rate: 67 Resp: 18 SpO2: 98 % O2 Device: Nasal cannula Oxygen Delivery: 2 LPM    Lab:  Lactate for Sepsis Protocol   Date Value Ref Range Status   10/31/2019 2.7 (H) 0.7 - 2.0 mmol/L Final     Comment:     Significant value called to and read back by  LIZ CONSTANTINO ON 33 AT 2101 AV         The patient is at baseline mental status.     The rest of their physical exam is significant for tachypnea,     Assessment & Plan   Tc Garcia meets SIRS criteria AND has a lactate >2 or other evidence of acute organ damage.  These vital signs, lab and physical exam findings are consistent with SEVERE SEPSIS.    Sepsis Time-Zero (time severe sepsis diagnosis confirmed):  10/31/19 as this was the time when Lactate resulted, and the level was > 2.0 his Pro calcitonin was checked this morning was elevated but patient is not on any antibiotics, he has leukocytosis and now lactic acid elevated as well.      Anti-infectives (From now, onward)    Start     Dose/Rate Route Frequency Ordered Stop    10/31/19 2130  piperacillin-tazobactam (ZOSYN) 4.5 g vial to attach to  mL bag      4.5 g  over 1 Hours Intravenous EVERY 6 HOURS 10/31/19 2117          Current antibiotic coverage requires additional antibiotics for unknown source source.    3 Hour Severe Sepsis Bundle Completion:  1. Initial Lactic Acid Result: No lab results found.  2. Blood Cultures before Antibiotics: Yes  3. Broad Spectrum Antibiotics Administered: yes  4. Fluids: 250 ml mL fluids ORDERED to be given     Start on broad spectrum IV antibiotics with Zosyn, hold Vancomycin for now given ARF. Check MRSA screen         Lab: Repeat lactic acid  ordered for 2 hours from now.    Disposition: The patient will remain on the current unit. We will continue to monitor this patient closely.  Vance Greene MD    Sepsis Criteria   Sepsis: 2+ SIRS criteria due to infection  Severe Sepsis: Sepsis AND 1+ new sign of acute organ dysfunction (Note: lactate >2 is organ dysfunction)  Septic Shock: Sepsis AND hypotension despite volume resuscitation with 30 ml/kg crystalloid

## 2019-11-01 NOTE — PLAN OF CARE
Discharge Planner PT   Patient plan for discharge: TCU  Current status: PT: Order received; Initial evaluation completed and treatment initiated as able. Prior to admit patient reports living in a home with 3 steps to enter. Lives with his wife who he reports is in good health. No use of an assistive device prior to admit; only endorses most recent fall. Denies any memory impairment. On eval patient needing min A for sit>stand; 2nd person for safety as needed; limited by L sided pain from broken ribs and chest tube site; only willing to ambulate 25 feet in room with a walker before he was too fatigued to continue and wanting to return to bedside chair; CGA and cues to reach back with R UE prior to sitting to lower self; some mild confusion with commands noted during session.   Barriers to return to prior living situation: current need for assist; functional weakness, decreased activity tolerance; pain; falls risk; stairs  Recommendations for discharge: TCU  Rationale for recommendations: Patient would benefit from ongoing skilled PT to address weakness, decreased activity tolerance and impaired functional mobility to allow for eventual return home with spouse.       Entered by: Katelyn Madsen 11/01/2019 4:42 PM

## 2019-11-01 NOTE — PROGRESS NOTES
11/01/19 1611   Quick Adds   Type of Visit Initial PT Evaluation   Living Environment   Lives With spouse   Living Arrangements house   Home Accessibility stairs to enter home   Number of Stairs, Main Entrance 3   Stair Railings, Main Entrance other (see comments)  (rail present)   Transportation Anticipated car, drives self   Living Environment Comment main level living   Self-Care   Usual Activity Tolerance excellent   Current Activity Tolerance moderate   Regular Exercise No   Equipment Currently Used at Home none   Activity/Exercise/Self-Care Comment normally independent without a device   Functional Level Prior   Ambulation 0-->independent   Transferring 0-->independent   Toileting 0-->independent   Bathing 0-->independent   Communication 0-->understands/communicates without difficulty   Swallowing 0-->swallows foods/liquids without difficulty   Cognition 0 - no cognition issues reported   Fall history within last six months yes   Number of times patient has fallen within last six months 1   Which of the above functional risks had a recent onset or change? ambulation;transferring   Prior Functional Level Comment patient normally independent   General Information   Onset of Illness/Injury or Date of Surgery - Date 10/30/19   Referring Physician Yahir Butler MD   Patient/Family Goals Statement go to rehab   Pertinent History of Current Problem (include personal factors and/or comorbidities that impact the POC) per chart: Tc Garcia is a 79 year old male with a history of atrial fibrillation, DM type II, HTN who presented to the ED on 10/30/2019 after fall down the stairs sustaining a C3 spinous process fracture, fracture of T2, multiple left sided rib fractures and moderate left hemothorax    Precautions/Limitations fall precautions   General Info Comments L sided chest tube   Cognitive Status Examination   Orientation orientation to person, place and time   Level of Consciousness alert   Follows  Commands and Answers Questions 75% of the time;100% of the time   Memory impaired   Cognitive Comment some difficulty following commands at times   Pain Assessment   Patient Currently in Pain No  (at rest)   Integumentary/Edema   Integumentary/Edema Comments significant L sided bruising   Posture    Posture Comments forward flexed in standing   Range of Motion (ROM)   ROM Comment L UE limited by pain from ribs/chest tube   Strength   Strength Comments functional weakness; further limited by pain on L side   Bed Mobility   Bed Mobility Comments NT; patient up in recliner chair and wanting to remain there   Transfer Skills   Transfer Comments sit<>stand with min A to come to stand and CGA to return to sitting in chair   Gait   Gait Comments only able to tolerate a short distance in room with rolling walker and min A of 1/2nd person to manage equipment   Balance   Balance Comments impaired; current need for walker and assist   General Therapy Interventions   Planned Therapy Interventions bed mobility training;gait training;strengthening;transfer training;progressive activity/exercise   Clinical Impression   Criteria for Skilled Therapeutic Intervention yes, treatment indicated   PT Diagnosis impaired gait/transfers;weakness   Influenced by the following impairments weakness, pain; some decreased command following; forgetful at times   Functional limitations due to impairments impaired independence with functional mobility   Clinical Presentation Evolving/Changing   Clinical Presentation Rationale clinical judgement   Clinical Decision Making (Complexity) Low complexity   Therapy Frequency Daily   Predicted Duration of Therapy Intervention (days/wks) 4 days   Anticipated Equipment Needs at Discharge front wheeled walker   Anticipated Discharge Disposition Transitional Care Facility   Risk & Benefits of therapy have been explained Yes   Patient, Family & other staff in agreement with plan of care Yes   [facility]  "AM-PAC TM \"6 Clicks\"   2016, Trustees of [facility], under license to [employer].  All rights reserved.   6 Clicks Short Forms Basic Mobility Inpatient Short Form   [facility]-PAC  \"6 Clicks\" V.2 Basic Mobility Inpatient Short Form   1. Turning from your back to your side while in a flat bed without using bedrails? 3 - A Little   2. Moving from lying on your back to sitting on the side of a flat bed without using bedrails? 2 - A Lot   3. Moving to and from a bed to a chair (including a wheelchair)? 3 - A Little   4. Standing up from a chair using your arms (e.g., wheelchair, or bedside chair)? 3 - A Little   5. To walk in hospital room? 3 - A Little   6. Climbing 3-5 steps with a railing? 2 - A Lot   Basic Mobility Raw Score (Score out of 24.Lower scores equate to lower levels of function) 16   Total Evaluation Time   Total Evaluation Time (Minutes) 15     "

## 2019-11-01 NOTE — PROVIDER NOTIFICATION
Prescriber Notification Note    The pharmacist has communicated with this patient's provider regarding a concern or therapy recommendation.    Notified Person: Dr. Greene  Date/Time of Notification: 2000 10/31  Interaction: phone  Concern/Recommendation:Day rounder was previously contacted with no response - PTA med list updated - Pt takes Propranolol 60 mg at bedtime and not BID as ordered - Pt did get a dose at about 1200 today - wanted to clarify if another dose should be given tonight. Per Dr. Greene - hold 2100 dose of Propranolol tonight and clarify dose with AM suzanne       Comments/Additional Details:2100 dose was marked as not given

## 2019-11-01 NOTE — PLAN OF CARE
VSS ex htn, A/O. SBA. CT no leaks, no crepitus. Large bruises on L lower back/hip area. Skin tear bilateral arms. Oxy given. zofran given for nausea. Poor appetite. Voiding fine. Recheck hgb 8.5. blood sugar in 300s. lantus increased. 2L O2, not able to wean, desat. IS 1250. +bs/gas. Tele SR.

## 2019-11-01 NOTE — PROGRESS NOTES
[facility]    Hospitalist Progress Note    Date of Service (when I saw the patient): 11/01/2019    Assessment & Plan    Tc Garcia is a 79 year old male with a history of atrial fibrillation, DM type II, HTN who presented to the ED on 10/30/2019 after fall down the stairs sustaining a C3 spinous process fracture, fracture of T2, multiple left sided rib fractures and moderate left hemothorax     Multiple left sided rib fractures   Moderate left hemothorax   C3 spinous process fracture  Fracture of T2  Sustained after a fall down the stairs.  Seen on CT scans of the cervical spine and chest.  Of note CT scan of the head did not show any evidence of bleeding.  In the ED a chest tube was placed as well   - Defer to trauma service, they've ok'd ambulation with assistance  - ortho spine notes cervical and thoracic fracture stable and ok to mobilize as tolerated with restrictions based on other injuries, no bracing necessary  - daily CXR per CT surgery  - CT surgery managing chest tube, currently to suction  - daily hgb; 12.2->10.4-> 8.6 11/1. Will repeat this evening 11/1 am and in the am 11/2.   - transfuse Hgb < 7. Transfusion consent is in chart     ABBIE  Creatinine on admission 10/30 at 1.09. [place] to 2.37 on 10/31. UOP  Sluggish, 90 ml 10/31. PTA on losartan-hydrochlorothiazide, lisinopril (ACE I and ARB). No noted hypotensive episodes. ? If relative hypotension with fall and ACEI/ ARB on board  - UA remarkable for hematuria (RBC>182), dipstick protein 30, 2 WBC  - avoid nephrotoxins  - IV fluids  - renal US with small R renal cyst, no obstruction  - UOP last 24 hours ~350 ml's am 11/1  - suspect ATN although this doesn't explain blood in urine. Most likely explanation for blood in urine is trauma. Would be very unlikely that with this acute event he developed a de debbie GN, also would expect more WBC if GN. Given concerns for infection as well (as below), will not given steroids. Also with hematoma  in region of kidney, making kidney trauma most likely  - will consult nephrology if rises further  - hold ACE I and ARB, would recommend (if able) to only take either ACE I or ARB in future    Leukocytosis  Admit WBC at 25.6 10/30. Repeat on 10/31 at 26.9, 11/1 at 25.9  - procalcitonin quite elevated at 4.69  - lactate last evening at 2.7  - started on empiric zosyn 10/31 and will continue  - blood cultures pending  - MRSA swabs negative    Atrial fibrillation   Normally anticoagulated on Eliquis.  EKG in the ED showed sinus bradycardia   - Holding Eliquis  - Restarted PTA verapamil 240 mg daily  - telemetry     DM type II   PTA on Metformin 500 mg BID, Actos 30 mg and insulin pump.  hgb A1C at 7.7% on admission 10/31. PTA insulin dosing ~50-60 Units daily through pump.   - Holding PTA meds and insulin pump   - started on lantus 8 units 10/31, needs increase and will increase to 10 units BID and likely will need further  - Moderate SSI   - Mod CHO diet     HTN   HLP   PTA Clonidine 0.3 mg BID, Propranolol 60 mg at HS, and Hyzaar losartan-hydrochlorothiazide 100- 25, lisinopril 40 mg daily. Also on lovastatin 20 mg at HS. Confirms was on both ACE I and ARB prior to admission  - hold Hyzaar, lisinopril.   - continue clonidine, propranolol  - hold statin for now    FEN (fluids, electrolytes and nutrition): IV fluids, advancing diet as tolerated  Discussed with nursing.  DVT Prophylaxis: Pneumatic Compression Devices  Code Status: Full Code    Disposition: Expected discharge in 3-4 days pending improvement with injuries, ABBIE    Yahir Butler MD  [phone] (P)  Text Page    Interval History   Overnight events reviewed. Breathing occasionally difficult 2/2 trauma/ bleed. Pain in area of chest with broken ribs. Still with nausea    -Data reviewed today: I reviewed all new labs and imaging results over the last 24 hours. I personally reviewed the EKG tracing showing atrial fibrillation.    Physical Exam   Temp: 97.8   F (36.6  C) Temp src: Oral BP: 129/68 Pulse: 70 Heart Rate: 72 Resp: 20 SpO2: 97 % O2 Device: Nasal cannula Oxygen Delivery: 1 LPM  Vitals:    10/30/19 1517 10/30/19 2100 10/31/19 0000   Weight: 93 kg (205 lb) 88.5 kg (195 lb 1.7 oz) 88.5 kg (195 lb 1.7 oz)     Vital Signs with Ranges  Temp:  [97.3  F (36.3  C)-98.5  F (36.9  C)] 97.8  F (36.6  C)  Pulse:  [66-77] 70  Heart Rate:  [67-86] 72  Resp:  [18-29] 20  BP: (123-159)/() 129/68  SpO2:  [92 %-99 %] 97 %  I/O last 3 completed shifts:  In: 3539.17 [P.O.:560; I.V.:2479.17; IV Piggyback:500]  Out: 715 [Urine:435; Drains:30; Chest Tube:250]    Constitutional: Alert, oriented, no acute distress  Respiratory: Lungs diminished with some crackles on L, otherwise clear  Cardiovascular: RRR am 11/1. No murmurs  GI: Soft, non-tender, non-disteneded, good bowel sounds  Skin/Integumen: No erythema, cyanosis or edema  Other:      Medications     sodium chloride 125 mL/hr at 11/01/19 0334       cloNIDine  0.3 mg Oral BID     insulin aspart  1-6 Units Subcutaneous Q4H     insulin glargine  8 Units Subcutaneous QAM AC     piperacillin-tazobactam  3.375 g Intravenous Q6H     propranolol  60 mg Oral BID     simvastatin  10 mg Oral At Bedtime     sodium chloride (PF)  3 mL Intracatheter Q8H     verapamil ER  240 mg Oral At Bedtime       Data   Recent Labs   Lab 11/01/19  0631 10/31/19  0744 10/30/19  1729   WBC 25.9* 26.9* 25.6*   HGB 8.6* 10.4* 12.2*   MCV 89 90 90    213 262   INR  --  1.37* 1.40*    139 140   POTASSIUM 4.4 4.3 3.2*   CHLORIDE 101 102 104   CO2 25 28 31   BUN 47* 25 15   CR 2.99* 2.37* 1.09   ANIONGAP 8 9 5   LLOYD 8.1* 8.7 8.8   * 199* 117*   ALBUMIN  --   --  3.6   PROTTOTAL  --   --  6.3*   BILITOTAL  --   --  0.6   ALKPHOS  --   --  73   ALT  --   --  30   AST  --   --  44       Recent Results (from the past 24 hour(s))   XR Chest Port 1 View    Narrative    CHEST PORTABLE ONE VIEW   10/31/2019 9:55 AM     HISTORY: Left hemothorax,  multiple left rib fractures.    COMPARISON: 10/30/2019.      Impression    IMPRESSION: Left chest tube is stable. Tiny left apical pneumothorax.  Multiple left rib fractures. Patchy opacities at the left mid to  inferior lung appears stable and could be some atelectasis. Right lung  is clear. Stable enlarged cardiac silhouette.    ROSA PAGE MD   US Renal Complete    Narrative    US RENAL COMPLETE  10/31/2019 11:04 PM     HISTORY: Acute kidney injury.    COMPARISON: None.    FINDINGS: The kidneys are normal in size, shape and position. The  right kidney measures 10.1 x 5.0 x 5.0 cm with a cortical thickness of  1.3 cm. The left kidney measures 10.5 x 5.4 x 4.8 cm with a cortical  thickness of 1.9 cm. There is a 1.3 cm cyst at the lower pole of the  right kidney. No solid renal mass, stone, or collecting system  dilatation. The urinary bladder is distended and appears normal.      Impression    IMPRESSION: Small right renal cyst. Otherwise unremarkable renal  ultrasound. No hydronephrosis.    ELMER SAENZ MD

## 2019-11-01 NOTE — PROGRESS NOTES
MD Notification    Notified Person: MD    Notified Person Name: Dr. Greene    Notification Date/Time: 10/31/19 2110    Notification Interaction: Paged    Purpose of Notification: Lactic 2.7    Orders Received:    Comments:

## 2019-11-01 NOTE — PLAN OF CARE
A&O. VSS on 1L. Pulm toileting strongly encouraged. Up with 1 assist. Tele SR. Dilaudid for pain. Zofran given for nausea. CT to suction. Renal US done. Blood Glucose elevated, MD updated.

## 2019-11-02 ENCOUNTER — APPOINTMENT (OUTPATIENT)
Dept: GENERAL RADIOLOGY | Facility: CLINIC | Age: 79
DRG: 963 | End: 2019-11-02
Attending: INTERNAL MEDICINE
Payer: COMMERCIAL

## 2019-11-02 ENCOUNTER — APPOINTMENT (OUTPATIENT)
Dept: CARDIOLOGY | Facility: CLINIC | Age: 79
DRG: 963 | End: 2019-11-02
Attending: INTERNAL MEDICINE
Payer: COMMERCIAL

## 2019-11-02 ENCOUNTER — APPOINTMENT (OUTPATIENT)
Dept: GENERAL RADIOLOGY | Facility: CLINIC | Age: 79
DRG: 963 | End: 2019-11-02
Attending: THORACIC SURGERY (CARDIOTHORACIC VASCULAR SURGERY)
Payer: COMMERCIAL

## 2019-11-02 LAB
ALBUMIN SERPL-MCNC: 2.8 G/DL (ref 3.4–5)
ALBUMIN UR-MCNC: 30 MG/DL
ALP SERPL-CCNC: 58 U/L (ref 40–150)
ALT SERPL W P-5'-P-CCNC: 33 U/L (ref 0–70)
ANION GAP SERPL CALCULATED.3IONS-SCNC: 10 MMOL/L (ref 3–14)
APPEARANCE UR: ABNORMAL
AST SERPL W P-5'-P-CCNC: 49 U/L (ref 0–45)
BASE DEFICIT BLDV-SCNC: 3 MMOL/L
BILIRUB DIRECT SERPL-MCNC: 0.5 MG/DL (ref 0–0.2)
BILIRUB SERPL-MCNC: 1.3 MG/DL (ref 0.2–1.3)
BILIRUB UR QL STRIP: NEGATIVE
BUN SERPL-MCNC: 52 MG/DL (ref 7–30)
CALCIUM SERPL-MCNC: 8.1 MG/DL (ref 8.5–10.1)
CHLORIDE SERPL-SCNC: 107 MMOL/L (ref 94–109)
CO2 SERPL-SCNC: 24 MMOL/L (ref 20–32)
COLOR UR AUTO: ABNORMAL
CREAT SERPL-MCNC: 2.72 MG/DL (ref 0.66–1.25)
ERYTHROCYTE [DISTWIDTH] IN BLOOD BY AUTOMATED COUNT: 15.9 % (ref 10–15)
GFR SERPL CREATININE-BSD FRML MDRD: 21 ML/MIN/{1.73_M2}
GLUCOSE BLDC GLUCOMTR-MCNC: 224 MG/DL (ref 70–99)
GLUCOSE BLDC GLUCOMTR-MCNC: 251 MG/DL (ref 70–99)
GLUCOSE BLDC GLUCOMTR-MCNC: 263 MG/DL (ref 70–99)
GLUCOSE BLDC GLUCOMTR-MCNC: 284 MG/DL (ref 70–99)
GLUCOSE BLDC GLUCOMTR-MCNC: 300 MG/DL (ref 70–99)
GLUCOSE BLDC GLUCOMTR-MCNC: 307 MG/DL (ref 70–99)
GLUCOSE BLDC GLUCOMTR-MCNC: 319 MG/DL (ref 70–99)
GLUCOSE SERPL-MCNC: 372 MG/DL (ref 70–99)
GLUCOSE UR STRIP-MCNC: 300 MG/DL
HCO3 BLDV-SCNC: 23 MMOL/L (ref 21–28)
HCT VFR BLD AUTO: 25.6 % (ref 40–53)
HGB BLD-MCNC: 8.2 G/DL (ref 13.3–17.7)
HGB UR QL STRIP: ABNORMAL
KETONES UR STRIP-MCNC: 10 MG/DL
LACTATE BLD-SCNC: 3.6 MMOL/L (ref 0.7–2)
LACTATE BLD-SCNC: 4.1 MMOL/L (ref 0.7–2)
LACTATE BLD-SCNC: NORMAL MMOL/L (ref 0.7–2)
LEUKOCYTE ESTERASE UR QL STRIP: ABNORMAL
MCH RBC QN AUTO: 28.4 PG (ref 26.5–33)
MCHC RBC AUTO-ENTMCNC: 32 G/DL (ref 31.5–36.5)
MCV RBC AUTO: 89 FL (ref 78–100)
NITRATE UR QL: NEGATIVE
NT-PROBNP SERPL-MCNC: 7948 PG/ML (ref 0–1800)
O2/TOTAL GAS SETTING VFR VENT: ABNORMAL %
OXYHGB MFR BLDV: 39 %
PCO2 BLDV: 48 MM HG (ref 40–50)
PH BLDV: 7.3 PH (ref 7.32–7.43)
PH UR STRIP: 5.5 PH (ref 5–7)
PLATELET # BLD AUTO: 257 10E9/L (ref 150–450)
PO2 BLDV: 26 MM HG (ref 25–47)
POTASSIUM SERPL-SCNC: 4.5 MMOL/L (ref 3.4–5.3)
PROT SERPL-MCNC: 5.2 G/DL (ref 6.8–8.8)
RBC # BLD AUTO: 2.89 10E12/L (ref 4.4–5.9)
RBC #/AREA URNS AUTO: >182 /HPF (ref 0–2)
SODIUM SERPL-SCNC: 141 MMOL/L (ref 133–144)
SOURCE: ABNORMAL
SP GR UR STRIP: 1.02 (ref 1–1.03)
TROPONIN I SERPL-MCNC: 0.06 UG/L (ref 0–0.04)
UROBILINOGEN UR STRIP-MCNC: NORMAL MG/DL (ref 0–2)
WBC # BLD AUTO: 28.2 10E9/L (ref 4–11)
WBC #/AREA URNS AUTO: 1 /HPF (ref 0–5)

## 2019-11-02 PROCEDURE — 99207 ZZC CDG-MDM COMPONENT: MEETS LOW - DOWN CODED: CPT | Performed by: INTERNAL MEDICINE

## 2019-11-02 PROCEDURE — 25800030 ZZH RX IP 258 OP 636: Performed by: INTERNAL MEDICINE

## 2019-11-02 PROCEDURE — 84484 ASSAY OF TROPONIN QUANT: CPT | Performed by: INTERNAL MEDICINE

## 2019-11-02 PROCEDURE — 93306 TTE W/DOPPLER COMPLETE: CPT | Mod: 26 | Performed by: INTERNAL MEDICINE

## 2019-11-02 PROCEDURE — 25000128 H RX IP 250 OP 636: Performed by: INTERNAL MEDICINE

## 2019-11-02 PROCEDURE — 36415 COLL VENOUS BLD VENIPUNCTURE: CPT | Performed by: INTERNAL MEDICINE

## 2019-11-02 PROCEDURE — 80076 HEPATIC FUNCTION PANEL: CPT | Performed by: INTERNAL MEDICINE

## 2019-11-02 PROCEDURE — 25000132 ZZH RX MED GY IP 250 OP 250 PS 637: Performed by: INTERNAL MEDICINE

## 2019-11-02 PROCEDURE — 25000131 ZZH RX MED GY IP 250 OP 636 PS 637: Performed by: INTERNAL MEDICINE

## 2019-11-02 PROCEDURE — 85027 COMPLETE CBC AUTOMATED: CPT | Performed by: INTERNAL MEDICINE

## 2019-11-02 PROCEDURE — 71045 X-RAY EXAM CHEST 1 VIEW: CPT | Mod: 77

## 2019-11-02 PROCEDURE — 71045 X-RAY EXAM CHEST 1 VIEW: CPT

## 2019-11-02 PROCEDURE — 93010 ELECTROCARDIOGRAM REPORT: CPT | Performed by: INTERNAL MEDICINE

## 2019-11-02 PROCEDURE — 40000264 ECHOCARDIOGRAM COMPLETE

## 2019-11-02 PROCEDURE — 83880 ASSAY OF NATRIURETIC PEPTIDE: CPT | Performed by: INTERNAL MEDICINE

## 2019-11-02 PROCEDURE — 81001 URINALYSIS AUTO W/SCOPE: CPT | Performed by: INTERNAL MEDICINE

## 2019-11-02 PROCEDURE — 12000000 ZZH R&B MED SURG/OB

## 2019-11-02 PROCEDURE — 83605 ASSAY OF LACTIC ACID: CPT | Performed by: INTERNAL MEDICINE

## 2019-11-02 PROCEDURE — 80048 BASIC METABOLIC PNL TOTAL CA: CPT | Performed by: INTERNAL MEDICINE

## 2019-11-02 PROCEDURE — 00000146 ZZHCL STATISTIC GLUCOSE BY METER IP

## 2019-11-02 PROCEDURE — 12000047 ZZH R&B IMCU

## 2019-11-02 PROCEDURE — 93005 ELECTROCARDIOGRAM TRACING: CPT

## 2019-11-02 PROCEDURE — 25500064 ZZH RX 255 OP 636: Performed by: INTERNAL MEDICINE

## 2019-11-02 PROCEDURE — 99232 SBSQ HOSP IP/OBS MODERATE 35: CPT | Performed by: INTERNAL MEDICINE

## 2019-11-02 PROCEDURE — 82805 BLOOD GASES W/O2 SATURATION: CPT | Performed by: INTERNAL MEDICINE

## 2019-11-02 RX ORDER — METOCLOPRAMIDE HYDROCHLORIDE 5 MG/ML
5 INJECTION INTRAMUSCULAR; INTRAVENOUS EVERY 6 HOURS PRN
Status: DISCONTINUED | OUTPATIENT
Start: 2019-11-02 | End: 2019-11-10 | Stop reason: HOSPADM

## 2019-11-02 RX ORDER — LIDOCAINE 40 MG/G
CREAM TOPICAL
Status: DISCONTINUED | OUTPATIENT
Start: 2019-11-02 | End: 2019-11-10 | Stop reason: HOSPADM

## 2019-11-02 RX ORDER — METOCLOPRAMIDE 5 MG/1
5 TABLET ORAL EVERY 6 HOURS PRN
Status: DISCONTINUED | OUTPATIENT
Start: 2019-11-02 | End: 2019-11-10 | Stop reason: HOSPADM

## 2019-11-02 RX ORDER — NITROGLYCERIN 0.4 MG/1
0.4 TABLET SUBLINGUAL EVERY 5 MIN PRN
Status: DISCONTINUED | OUTPATIENT
Start: 2019-11-02 | End: 2019-11-10 | Stop reason: HOSPADM

## 2019-11-02 RX ADMIN — ONDANSETRON 4 MG: 2 INJECTION INTRAMUSCULAR; INTRAVENOUS at 16:06

## 2019-11-02 RX ADMIN — OXYCODONE HYDROCHLORIDE 5 MG: 5 TABLET ORAL at 17:12

## 2019-11-02 RX ADMIN — SIMVASTATIN 10 MG: 10 TABLET, FILM COATED ORAL at 21:12

## 2019-11-02 RX ADMIN — INSULIN GLARGINE 15 UNITS: 100 INJECTION, SOLUTION SUBCUTANEOUS at 22:17

## 2019-11-02 RX ADMIN — INSULIN ASPART 3 UNITS: 100 INJECTION, SOLUTION INTRAVENOUS; SUBCUTANEOUS at 13:06

## 2019-11-02 RX ADMIN — ONDANSETRON 4 MG: 2 INJECTION INTRAMUSCULAR; INTRAVENOUS at 08:52

## 2019-11-02 RX ADMIN — INSULIN ASPART 4 UNITS: 100 INJECTION, SOLUTION INTRAVENOUS; SUBCUTANEOUS at 09:02

## 2019-11-02 RX ADMIN — SODIUM CHLORIDE: 9 INJECTION, SOLUTION INTRAVENOUS at 01:13

## 2019-11-02 RX ADMIN — HUMAN ALBUMIN MICROSPHERES AND PERFLUTREN 9 ML: 10; .22 INJECTION, SOLUTION INTRAVENOUS at 16:34

## 2019-11-02 RX ADMIN — INSULIN ASPART 2 UNITS: 100 INJECTION, SOLUTION INTRAVENOUS; SUBCUTANEOUS at 22:17

## 2019-11-02 RX ADMIN — PIPERACILLIN SODIUM AND TAZOBACTAM SODIUM 3.38 G: 3; .375 INJECTION, POWDER, LYOPHILIZED, FOR SOLUTION INTRAVENOUS at 09:13

## 2019-11-02 RX ADMIN — INSULIN ASPART 4 UNITS: 100 INJECTION, SOLUTION INTRAVENOUS; SUBCUTANEOUS at 06:51

## 2019-11-02 RX ADMIN — OXYCODONE HYDROCHLORIDE 5 MG: 5 TABLET ORAL at 01:01

## 2019-11-02 RX ADMIN — INSULIN GLARGINE 15 UNITS: 100 INJECTION, SOLUTION SUBCUTANEOUS at 10:55

## 2019-11-02 RX ADMIN — HYDROMORPHONE HYDROCHLORIDE 0.2 MG: 1 INJECTION, SOLUTION INTRAMUSCULAR; INTRAVENOUS; SUBCUTANEOUS at 09:02

## 2019-11-02 RX ADMIN — PIPERACILLIN SODIUM AND TAZOBACTAM SODIUM 3.38 G: 3; .375 INJECTION, POWDER, LYOPHILIZED, FOR SOLUTION INTRAVENOUS at 22:17

## 2019-11-02 RX ADMIN — PIPERACILLIN SODIUM AND TAZOBACTAM SODIUM 3.38 G: 3; .375 INJECTION, POWDER, LYOPHILIZED, FOR SOLUTION INTRAVENOUS at 16:09

## 2019-11-02 RX ADMIN — SODIUM CHLORIDE 500 ML: 9 INJECTION, SOLUTION INTRAVENOUS at 10:55

## 2019-11-02 RX ADMIN — OXYCODONE HYDROCHLORIDE 5 MG: 5 TABLET ORAL at 21:12

## 2019-11-02 RX ADMIN — SODIUM CHLORIDE: 9 INJECTION, SOLUTION INTRAVENOUS at 14:25

## 2019-11-02 RX ADMIN — ONDANSETRON 4 MG: 2 INJECTION INTRAMUSCULAR; INTRAVENOUS at 01:06

## 2019-11-02 RX ADMIN — PIPERACILLIN SODIUM AND TAZOBACTAM SODIUM 3.38 G: 3; .375 INJECTION, POWDER, LYOPHILIZED, FOR SOLUTION INTRAVENOUS at 03:34

## 2019-11-02 RX ADMIN — INSULIN ASPART 3 UNITS: 100 INJECTION, SOLUTION INTRAVENOUS; SUBCUTANEOUS at 17:16

## 2019-11-02 RX ADMIN — INSULIN ASPART 3 UNITS: 100 INJECTION, SOLUTION INTRAVENOUS; SUBCUTANEOUS at 01:58

## 2019-11-02 ASSESSMENT — ACTIVITIES OF DAILY LIVING (ADL)
ADLS_ACUITY_SCORE: 15
ADLS_ACUITY_SCORE: 16
ADLS_ACUITY_SCORE: 15

## 2019-11-02 ASSESSMENT — MIFFLIN-ST. JEOR: SCORE: 1641.25

## 2019-11-02 NOTE — PROGRESS NOTES
Called for bradycardia on tele HR 40s and pt asymptomatic. He is on propranol and verapamil . Will discharge  propranolol continue verapamil       Rosalba Medel MD

## 2019-11-02 NOTE — PROGRESS NOTES
[facility]    Hospitalist Progress Note    Date of Service (when I saw the patient): 11/02/2019    Assessment & Plan    Tc Garcia is a 79 year old male with a history of atrial fibrillation, DM type II, HTN who presented to the ED on 10/30/2019 after fall down the stairs sustaining a C3 spinous process fracture, fracture of T2, multiple left sided rib fractures and moderate left hemothorax     Mechanical fall  Multiple left sided rib fractures   Moderate left hemothorax   C3 spinous process fracture  Fracture of T2  anemia  Sustained after a fall down the stairs. No dizziness or LOC. No chest pain or palpitations. Seen on CT scans of the cervical spine and chest.  Of note CT scan of the head did not show any evidence of bleeding.  In the ED a chest tube was placed as well   - Defer to trauma service, they've ok'd ambulation with assistance  - ortho spine notes cervical and thoracic fracture stable and ok to mobilize as tolerated with restrictions based on other injuries, no bracing necessary  - daily CXR per CT surgery  - CT surgery managing chest tube, currently to suction  - daily hgb; 12.2->10.4-> 8.6->8.5->8.2 11/2  - transfuse Hgb < 7. Transfusion consent is in chart     Alcohol abuse  Has longstanding h/o heavy Etoh use (per wife).  No signs of withdrawal. ? Contributing to fall but wife doesn't think that he was drinking at the time of the fall.   - monitor for now    ABBIE  Creatinine on admission 10/30 at 1.09. [place] to 2.37 on 10/31. UOP  Sluggish, 90 ml 10/31. PTA on losartan-hydrochlorothiazide, lisinopril (ACE I and ARB). No noted hypotensive episodes. ? If relative hypotension with fall and ACEI/ ARB on board. CK sl elevated but not enough to explain.  - UA remarkable for hematuria (RBC>182), dipstick protein 30, 2 WBC  - avoid nephrotoxins  - IV fluids  - renal US with small R renal cyst, no obstruction  - UOP last 24 hours ~900 ml's 11/2  - suspect ATN although this doesn't explain  blood in urine. Most likely explanation for blood in urine is trauma. Would be very unlikely that with this acute event he developed a de debbie GN, also would expect more WBC if GN. Given concerns for infection as well (as below), will not given steroids. Also with subcutaneous hematoma in region of kidney, making kidney trauma most likely  - hold ACE I and ARB, would recommend (if able) to only take either ACE I or ARB in future  - UOP excellent 11/2, creat improved slightly on 11/2    Atrial fibrillation   Bradycardia  Normally anticoagulated on Eliquis.  EKG in the ED showed sinus bradycardia. Telemetry overnight 11/1-11/2 with HR to 40's.  - Holding Eliquis  - Restarted PTA verapamil 240 mg daily  - discontinue clonidine, propranolol with bradycardia (and mild hypotension)  - continue telemetry    Leukocytosis  Admit WBC at 25.6 10/30, 10/31 at 26.9, 11/1 at 25.9, 11/2 at 28.2.   - procalcitonin quite elevated at 4.69  - lactate 10/31 at 2.7, 2.5  - started on empiric zosyn 10/31 and will continue  - blood cultures NGTD 11/2  - MRSA swabs negative  - repeat lactate in the am 11/3  - CXR 11/2 possible infiltrate in L lung (may be contusion as well)  - with leukocytosis will repeat UA     DM type II   PTA on Metformin 500 mg BID, Actos 30 mg and insulin pump.  hgb A1C at 7.7% on admission 10/31. PTA insulin dosing ~50-60 Units daily through pump.   - Holding PTA meds and insulin pump   - started on lantus 8 units 10/31,increased to 10 units BID 11/1, given high blood sugars will further increase to 15 units BID  - Moderate SSI   - Mod CHO diet     HTN   HLP   PTA Clonidine 0.3 mg BID, Propranolol 60 mg at HS, and Hyzaar losartan-hydrochlorothiazide 100- 25, lisinopril 40 mg daily. Also on lovastatin 20 mg at HS. Confirms was on both ACE I and ARB prior to admission  - hold Hyzaar, lisinopril.   - hold clonidine, propranolol given bradycardia overnight  - hold statin for now    Anemia  Presumed 2/2 bleeding,  following closely as above    FEN (fluids, electrolytes and nutrition): IV fluids, advancing diet as tolerated  Discussed with nursing.  DVT Prophylaxis: Pneumatic Compression Devices  Code Status: Full Code    Disposition: Expected discharge in 3-4 days pending improvement with injuries, ABBIE    Yahir Butler MD  [phone] (P)  Text Page    Interval History   Overnight events reviewed. States couldn't sleep 2/2 pain. Feels breathing ok, not eating much. No abdominal pain    -Data reviewed today: I reviewed all new labs and imaging results over the last 24 hours. I personally reviewed the EKG tracing showing atrial fibrillation.    Physical Exam   Temp: 98.4  F (36.9  C) Temp src: Oral BP: 98/54 Pulse: (!) 43 Heart Rate: (!) 43 Resp: 18 SpO2: 93 % O2 Device: Nasal cannula Oxygen Delivery: 2 LPM  Vitals:    10/30/19 2100 10/31/19 0000 11/02/19 0532   Weight: 88.5 kg (195 lb 1.7 oz) 88.5 kg (195 lb 1.7 oz) 92.5 kg (203 lb 14.8 oz)     Vital Signs with Ranges  Temp:  [97.9  F (36.6  C)-98.4  F (36.9  C)] 98.4  F (36.9  C)  Pulse:  [43-78] 43  Heart Rate:  [43-76] 43  Resp:  [16-18] 18  BP: ()/(54-87) 98/54  SpO2:  [92 %-94 %] 93 %  I/O last 3 completed shifts:  In: 925 [P.O.:450; I.V.:475]  Out: 1305 [Urine:925; Chest Tube:380]    Constitutional: Alert, oriented, no acute distress  Respiratory: Lungs diminished with some crackles on L, otherwise clear. CT in place on L lung  Cardiovascular: bradycardic, regular. Tr edema  GI: Soft, non-tender, non-disteneded, good bowel sounds. No RUQ tenderness  Skin/Integumen: No erythema, cyanosis  Other:      Medications     sodium chloride 100 mL/hr at 11/02/19 0113       insulin aspart  1-6 Units Subcutaneous Q4H     insulin glargine  10 Units Subcutaneous BID     piperacillin-tazobactam  3.375 g Intravenous Q6H     simvastatin  10 mg Oral At Bedtime     sodium chloride (PF)  3 mL Intracatheter Q8H     verapamil ER  240 mg Oral At Bedtime       Data   Recent Labs    Lab 11/02/19  0713 11/01/19  1806 11/01/19  0631 10/31/19  0744 10/30/19  1729   WBC 28.2*  --  25.9* 26.9* 25.6*   HGB 8.2* 8.5* 8.6* 10.4* 12.2*   MCV 89  --  89 90 90     --  224 213 262   INR  --   --   --  1.37* 1.40*     --  134 139 140   POTASSIUM 4.5  --  4.4 4.3 3.2*   CHLORIDE 107  --  101 102 104   CO2 24  --  25 28 31   BUN 52*  --  47* 25 15   CR 2.72*  --  2.99* 2.37* 1.09   ANIONGAP 10  --  8 9 5   LLOYD 8.1*  --  8.1* 8.7 8.8   *  --  355* 199* 117*   ALBUMIN  --   --   --   --  3.6   PROTTOTAL  --   --   --   --  6.3*   BILITOTAL  --   --   --   --  0.6   ALKPHOS  --   --   --   --  73   ALT  --   --   --   --  30   AST  --   --   --   --  44       Recent Results (from the past 24 hour(s))   XR Chest Port 1 View    Narrative    CHEST ONE VIEW PORTABLE   11/2/2019 5:42 AM     HISTORY: left hemothorax, multiple left rib fx    COMPARISON: 11/1/2019 chest x-ray 0625 hours      Impression    IMPRESSION: No significant interval change. Redemonstrated left chest  tube and multiple left rib fractures, no pneumothorax. Mild  retrocardiac opacity lower left lung consistent with atelectasis or  infiltrate or possibly contusion. Right lung clear. Normal caliber  pulmonary vessels.    JENNIFER BROWN MD

## 2019-11-02 NOTE — PLAN OF CARE
PT-Attempted to see. Patient very nauseated and HR 33 when therapist was in the room. Informed nurse and charge nurse aware.    Alert and oriented to person, place and time

## 2019-11-02 NOTE — PROVIDER NOTIFICATION
DATE:  11/2/2019   TIME OF RECEIPT FROM LAB:  1023  LAB TEST:  Lactic  LAB VALUE:  4.1  RESULTS GIVEN WITH READ-BACK TO (PROVIDER):  Dr. Butler  TIME LAB VALUE REPORTED TO PROVIDER:   1034

## 2019-11-02 NOTE — PROGRESS NOTES
Patient became hypotensive (90s/50s) and continued to be bradycardic (40s), tele showed junctional rhythm, 97% on 3L. A/O x4, though mid morning mentation appeared slower (still oriented). See provider notification. Lung sounds clear, BS active, + flatus. Patient appeared pale and skin cool. CT to suction, no crepitous or air leak. Pain was controlled with Dilaudid. Patient complained of intermittent nausea - Zofran x1. Voiding via urinal - awaiting UA specimen. Up with assist of 2. Patient placed on IMC - bolus started. Plan for ECHO as tolerated.

## 2019-11-02 NOTE — PROGRESS NOTES
THORACIC SURGERY    CT output moderate 380 ml last 24 hrs  No bleeding    CXR  Left pleural space well drained    CT suction for now    DONTRELL CARRILLO MD [facility] ONCOLOGY THORACIC SURGERY  CELL:  [phone]  OFFICE: [phone]

## 2019-11-02 NOTE — PLAN OF CARE
Dx: Hemothorax, broken ribs, fractured C3, T2. Hx:DM II, HTN. VSS on 2L via nasal canula. Pt did have period this AM around 0500 became harrison. Tele: SR BBB switched to junctional, pt asymptomatic. MD notified.. A/Ox4. Blood sugars: Q4H, running high 200's. Incisions CT on left mid abdominal region set to -20 suction. Pain controlled with PRN oxy. Up with SBA. Tolerating mod CHO diet. N&V int. Controlled with PRN Zofran. +gas, -bm. Voiding with urinal. LS diminished on left side. Will continue to monitor.

## 2019-11-02 NOTE — PLAN OF CARE
A&OX4.  Bradycardia, junctional in upper 30s.  Other  VSS.  CT to 20 cm suction with serosang output.  No AL.  No crepitus.  Left sided ecchymosis/soreness.  Voiding dark izabel/brown urine.  Intermittent nausea, no appetite.

## 2019-11-03 ENCOUNTER — APPOINTMENT (OUTPATIENT)
Dept: PHYSICAL THERAPY | Facility: CLINIC | Age: 79
DRG: 963 | End: 2019-11-03
Payer: COMMERCIAL

## 2019-11-03 LAB
ANION GAP SERPL CALCULATED.3IONS-SCNC: 8 MMOL/L (ref 3–14)
BASOPHILS # BLD AUTO: 0 10E9/L (ref 0–0.2)
BASOPHILS NFR BLD AUTO: 0.1 %
BUN SERPL-MCNC: 64 MG/DL (ref 7–30)
CALCIUM SERPL-MCNC: 7.7 MG/DL (ref 8.5–10.1)
CHLORIDE SERPL-SCNC: 108 MMOL/L (ref 94–109)
CO2 SERPL-SCNC: 24 MMOL/L (ref 20–32)
CREAT SERPL-MCNC: 3.51 MG/DL (ref 0.66–1.25)
DIFFERENTIAL METHOD BLD: ABNORMAL
EOSINOPHIL # BLD AUTO: 0 10E9/L (ref 0–0.7)
EOSINOPHIL NFR BLD AUTO: 0 %
ERYTHROCYTE [DISTWIDTH] IN BLOOD BY AUTOMATED COUNT: 16.3 % (ref 10–15)
GFR SERPL CREATININE-BSD FRML MDRD: 15 ML/MIN/{1.73_M2}
GLUCOSE BLDC GLUCOMTR-MCNC: 201 MG/DL (ref 70–99)
GLUCOSE BLDC GLUCOMTR-MCNC: 212 MG/DL (ref 70–99)
GLUCOSE BLDC GLUCOMTR-MCNC: 212 MG/DL (ref 70–99)
GLUCOSE BLDC GLUCOMTR-MCNC: 221 MG/DL (ref 70–99)
GLUCOSE BLDC GLUCOMTR-MCNC: 248 MG/DL (ref 70–99)
GLUCOSE BLDC GLUCOMTR-MCNC: 277 MG/DL (ref 70–99)
GLUCOSE BLDC GLUCOMTR-MCNC: 308 MG/DL (ref 70–99)
GLUCOSE SERPL-MCNC: 256 MG/DL (ref 70–99)
HCT VFR BLD AUTO: 24.1 % (ref 40–53)
HGB BLD-MCNC: 8 G/DL (ref 13.3–17.7)
IMM GRANULOCYTES # BLD: 0.3 10E9/L (ref 0–0.4)
IMM GRANULOCYTES NFR BLD: 1.4 %
LACTATE BLD-SCNC: 2.3 MMOL/L (ref 0.7–2)
LYMPHOCYTES # BLD AUTO: 0.9 10E9/L (ref 0.8–5.3)
LYMPHOCYTES NFR BLD AUTO: 4 %
MCH RBC QN AUTO: 29.4 PG (ref 26.5–33)
MCHC RBC AUTO-ENTMCNC: 33.2 G/DL (ref 31.5–36.5)
MCV RBC AUTO: 89 FL (ref 78–100)
MONOCYTES # BLD AUTO: 2.1 10E9/L (ref 0–1.3)
MONOCYTES NFR BLD AUTO: 9.4 %
NEUTROPHILS # BLD AUTO: 19 10E9/L (ref 1.6–8.3)
NEUTROPHILS NFR BLD AUTO: 85.1 %
NRBC # BLD AUTO: 0.2 10*3/UL
NRBC BLD AUTO-RTO: 1 /100
PLATELET # BLD AUTO: 201 10E9/L (ref 150–450)
POTASSIUM SERPL-SCNC: 4.3 MMOL/L (ref 3.4–5.3)
RBC # BLD AUTO: 2.72 10E12/L (ref 4.4–5.9)
SODIUM SERPL-SCNC: 140 MMOL/L (ref 133–144)
WBC # BLD AUTO: 22.3 10E9/L (ref 4–11)

## 2019-11-03 PROCEDURE — 36415 COLL VENOUS BLD VENIPUNCTURE: CPT | Performed by: INTERNAL MEDICINE

## 2019-11-03 PROCEDURE — 25000132 ZZH RX MED GY IP 250 OP 250 PS 637: Performed by: INTERNAL MEDICINE

## 2019-11-03 PROCEDURE — 97530 THERAPEUTIC ACTIVITIES: CPT | Mod: GP

## 2019-11-03 PROCEDURE — 80048 BASIC METABOLIC PNL TOTAL CA: CPT | Performed by: INTERNAL MEDICINE

## 2019-11-03 PROCEDURE — 25800030 ZZH RX IP 258 OP 636: Performed by: INTERNAL MEDICINE

## 2019-11-03 PROCEDURE — 25800030 ZZH RX IP 258 OP 636: Performed by: HOSPITALIST

## 2019-11-03 PROCEDURE — 12000000 ZZH R&B MED SURG/OB

## 2019-11-03 PROCEDURE — 97110 THERAPEUTIC EXERCISES: CPT | Mod: GP

## 2019-11-03 PROCEDURE — 25000128 H RX IP 250 OP 636: Performed by: INTERNAL MEDICINE

## 2019-11-03 PROCEDURE — 97116 GAIT TRAINING THERAPY: CPT | Mod: GP

## 2019-11-03 PROCEDURE — 99233 SBSQ HOSP IP/OBS HIGH 50: CPT | Performed by: INTERNAL MEDICINE

## 2019-11-03 PROCEDURE — 25000131 ZZH RX MED GY IP 250 OP 636 PS 637: Performed by: INTERNAL MEDICINE

## 2019-11-03 PROCEDURE — 00000146 ZZHCL STATISTIC GLUCOSE BY METER IP

## 2019-11-03 PROCEDURE — 83605 ASSAY OF LACTIC ACID: CPT | Performed by: INTERNAL MEDICINE

## 2019-11-03 PROCEDURE — 85025 COMPLETE CBC W/AUTO DIFF WBC: CPT | Performed by: INTERNAL MEDICINE

## 2019-11-03 PROCEDURE — 12000047 ZZH R&B IMCU

## 2019-11-03 RX ORDER — FUROSEMIDE 10 MG/ML
40 INJECTION INTRAMUSCULAR; INTRAVENOUS ONCE
Status: COMPLETED | OUTPATIENT
Start: 2019-11-03 | End: 2019-11-03

## 2019-11-03 RX ORDER — PIPERACILLIN SODIUM, TAZOBACTAM SODIUM 2; .25 G/10ML; G/10ML
2.25 INJECTION, POWDER, LYOPHILIZED, FOR SOLUTION INTRAVENOUS EVERY 6 HOURS
Status: DISCONTINUED | OUTPATIENT
Start: 2019-11-03 | End: 2019-11-05

## 2019-11-03 RX ADMIN — OXYCODONE HYDROCHLORIDE 5 MG: 5 TABLET ORAL at 16:40

## 2019-11-03 RX ADMIN — INSULIN ASPART 2 UNITS: 100 INJECTION, SOLUTION INTRAVENOUS; SUBCUTANEOUS at 04:09

## 2019-11-03 RX ADMIN — HYDRALAZINE HYDROCHLORIDE 10 MG: 20 INJECTION INTRAMUSCULAR; INTRAVENOUS at 18:15

## 2019-11-03 RX ADMIN — INSULIN ASPART 3 UNITS: 100 INJECTION, SOLUTION INTRAVENOUS; SUBCUTANEOUS at 20:51

## 2019-11-03 RX ADMIN — SODIUM CHLORIDE: 9 INJECTION, SOLUTION INTRAVENOUS at 06:56

## 2019-11-03 RX ADMIN — INSULIN ASPART 2 UNITS: 100 INJECTION, SOLUTION INTRAVENOUS; SUBCUTANEOUS at 12:11

## 2019-11-03 RX ADMIN — INSULIN GLARGINE 20 UNITS: 100 INJECTION, SOLUTION SUBCUTANEOUS at 08:40

## 2019-11-03 RX ADMIN — INSULIN ASPART 4 UNITS: 100 INJECTION, SOLUTION INTRAVENOUS; SUBCUTANEOUS at 16:36

## 2019-11-03 RX ADMIN — PIPERACILLIN SODIUM AND TAZOBACTAM SODIUM 3.38 G: 3; .375 INJECTION, POWDER, LYOPHILIZED, FOR SOLUTION INTRAVENOUS at 08:51

## 2019-11-03 RX ADMIN — SODIUM CHLORIDE 500 ML: 9 INJECTION, SOLUTION INTRAVENOUS at 00:41

## 2019-11-03 RX ADMIN — INSULIN ASPART 2 UNITS: 100 INJECTION, SOLUTION INTRAVENOUS; SUBCUTANEOUS at 08:40

## 2019-11-03 RX ADMIN — SODIUM CHLORIDE: 9 INJECTION, SOLUTION INTRAVENOUS at 04:20

## 2019-11-03 RX ADMIN — PIPERACILLIN SODIUM,TAZOBACTAM SODIUM 2.25 G: 2; .25 INJECTION, POWDER, FOR SOLUTION INTRAVENOUS at 14:40

## 2019-11-03 RX ADMIN — PIPERACILLIN SODIUM AND TAZOBACTAM SODIUM 3.38 G: 3; .375 INJECTION, POWDER, LYOPHILIZED, FOR SOLUTION INTRAVENOUS at 02:36

## 2019-11-03 RX ADMIN — SIMVASTATIN 10 MG: 10 TABLET, FILM COATED ORAL at 22:01

## 2019-11-03 RX ADMIN — INSULIN GLARGINE 20 UNITS: 100 INJECTION, SOLUTION SUBCUTANEOUS at 20:52

## 2019-11-03 RX ADMIN — SODIUM CHLORIDE: 9 INJECTION, SOLUTION INTRAVENOUS at 18:15

## 2019-11-03 RX ADMIN — PIPERACILLIN SODIUM,TAZOBACTAM SODIUM 2.25 G: 2; .25 INJECTION, POWDER, FOR SOLUTION INTRAVENOUS at 20:51

## 2019-11-03 RX ADMIN — INSULIN ASPART 2 UNITS: 100 INJECTION, SOLUTION INTRAVENOUS; SUBCUTANEOUS at 01:05

## 2019-11-03 RX ADMIN — FUROSEMIDE 40 MG: 10 INJECTION, SOLUTION INTRAVENOUS at 08:50

## 2019-11-03 ASSESSMENT — ACTIVITIES OF DAILY LIVING (ADL)
ADLS_ACUITY_SCORE: 16
ADLS_ACUITY_SCORE: 15
ADLS_ACUITY_SCORE: 15
ADLS_ACUITY_SCORE: 16
ADLS_ACUITY_SCORE: 15
ADLS_ACUITY_SCORE: 15

## 2019-11-03 ASSESSMENT — MIFFLIN-ST. JEOR: SCORE: 1668.35

## 2019-11-03 NOTE — PLAN OF CARE
Pt is A&O x 3, forgetful at times, sinus harrison, pulled out 2nd IV. Breathing normally, lungs diminished. Pressure dressing to left back at chest tube site. Voiding x 1 after bolus, bladder scanned for 170 ml.

## 2019-11-03 NOTE — PLAN OF CARE
A&OX4 today.  VSS, except bradycardia. Minimal pain except for left rib pain with movement.  Ecchymosis to left rib area.  Up with 1.  Voiding dark izabel/brown urine.  Tolerating po.

## 2019-11-03 NOTE — PLAN OF CARE
A&O. VSS, on 3 L O2. Tele junctional rythm, HR high 30's. Mod carb diet,no appetite and did not eat dinner, Zofran x1, ineffective per report, new PRN Reglan ordered but nausea had resolved then. Up with assist of 1 per report, turns well in bed with encouragement. 5 mg oxycodone x2 for left flank pain. Chest tube to suction, 150 ml serosanguinous output, no air leak. Urine output 175 ml over the last 8 hrs, post void residual 22 ml, MD notified and will give 500 ml fluid bolus.

## 2019-11-03 NOTE — PLAN OF CARE
Discharge Planner PT   Patient plan for discharge: TCU  Current status: Pt requires min assist for bed mobility and transfers. Pt tolerates ambulating 100' x 2 with FWW and CGA, one seated and two standing rest breaks required. Pt on 4L O2 via NC, VSS throughout session.  Barriers to return to prior living situation: Decreased activity tolerance, assist with all mobility, fall risk, fall history, stairs, pain  Recommendations for discharge: TCU  Rationale for recommendations: Pt currently below baseline mobility and requires assist with all mobility. Pts pain interfering with mobility and patient is currently a fall risk. Pt will benefit from continued therapy at TCU to address impairments and increase mobility and functional independence prior to returning home.        Entered by: Sofía De Anda 11/03/2019 11:00 AM

## 2019-11-03 NOTE — PROGRESS NOTES
Paged for borderline oliguria, 20 ml/hour UOP over the past 8 hours, without hypotension. Will order a further bolus 500 ml/hour NS and continue continuous IVF overnight.

## 2019-11-03 NOTE — PROGRESS NOTES
At 2342, patient's bed alarm went off and he was sitting at edge of bed with chest tube and IV out and gown off. Occlusive gauze with Vaseline applied and STAT chest X-ray done.     Notified MD at 1205 AM.    Spoke with: Dr. You    Orders: cover w/ guaze and monitor patient status    Comments: Night RN updated

## 2019-11-03 NOTE — PROGRESS NOTES
THORACIC SURGERY    CT came out last night    CXR ok    no indication for further drainage at this time    CXR in am    DONTRELL CARRILLO MD [facility] ONCOLOGY THORACIC SURGERY  CELL:  [phone]  OFFICE: [phone]

## 2019-11-03 NOTE — PROGRESS NOTES
[facility]    Hospitalist Progress Note    Date of Service (when I saw the patient): 11/03/2019    Assessment & Plan    Tc Garcia is a 79 year old male with a history of atrial fibrillation, DM type II, HTN who presented to the ED on 10/30/2019 after fall down the stairs sustaining a C3 spinous process fracture, fracture of T2, multiple left sided rib fractures and moderate left hemothorax     Mechanical fall  Multiple left sided rib fractures   Moderate left hemothorax   C3 spinous process fracture  Fracture of T2  Anemia  Sustained after a fall down the stairs. No dizziness or LOC. No chest pain or palpitations. Numerous fractures (L rib fractures C3 spinous process fx, T2 acute fracure) seen on CT scans of the cervical spine and chest.  Of note CT scan of the head did not show any evidence of bleeding.  In the ED a chest tube was placed as well   - Trauma service ok'd ambulation with assistance  - Ortho spine notes cervical and thoracic fracture stable and ok to mobilize as tolerated with restrictions based on other injuries, no bracing necessary  - daily CXR per CT surgery  - transfuse Hgb < 7. Transfusion consent is in chart  - daily hgb; 12.2->10.4-> 8.6->8.5->8.2->8.0 11/3  - self pulled chest tube overnight 11/2-3. CT surgery aware, CXR with no pneumothorax after this     Delirium  Intermittent, pulled IV's, chest tube out overnight. 11/3 morning appropriate but still with mild confusion. Neuro nonfocal.   - monitor, will consider low dose seroquel if continues    Alcohol use/ abuse disorder  Has longstanding h/o heavy Etoh use (per wife).  No signs of withdrawal. ? Contributing to fall but wife doesn't think that he was drinking at the time of the fall.   - monitor for now    ABBIE  Creatinine on admission 10/30 at 1.09. [place] to 2.37 on 10/31. UOP  Sluggish, 90 ml 10/31. PTA on losartan-hydrochlorothiazide, lisinopril (ACE I and ARB). No noted hypotensive episodes initially but some  hypotension to 80-90's systolic during stay as well as bradycardia. ? If relative hypotension with fall and ACEI/ ARB on board. CK sl elevated but not enough to explain.  - UA remarkable for hematuria (RBC>182), dipstick protein 30, 2 WBC  - avoid nephrotoxins  - IV fluids  - renal US with small R renal cyst, no obstruction  - UOP 11/3 output decreasing again  - suspect ATN although this doesn't explain blood in urine. CK sl elevated but not enough to explain. Most likely explanation for blood in urine is trauma. Would be very unlikely that with this acute event he developed a de debbie GN, also would expect more WBC if GN. Given concerns for infection as well (as below), will not given steroids. Also with severe ecchymoses  in region of kidney, making kidney trauma most likely  - hold ACE I and ARB, would recommend (if able) to only take either ACE I or ARB in future  - creat up 11/3 with decreasing UOP, will ask nephrology to see, appreciate assistance  - give lasix 40 mg IV x 1 to try to avoid anuric renal failure    Bradycardia  Atrial fibrillation   Elevated BNP  Normally anticoagulated on Eliquis.  EKG in the ED showed sinus bradycardia. 11/2-11/3 harrison to 30-40's  - Holding Eliquis  - discontinue clonidine, propranolol, verapamil with bradycardia (and mild hypotension)  - continue telemetry  - I'm suspicious with renal failure that drugs may have not been cleared causing bradycardia  - appears to be improving 11/3 am  - BNP elevated ~8k on 11/2. Echo with EF 55-60%, mod LVH. Clinically with 1+ edema but no other clear signs of CHF. Suspect elevated BNP 2/2 ABBIE    Leukocytosis  Admit WBC at 25.6->26.9->25.9->28.2 11/2. Mild lactic acidosis on admission  - procalcitonin quite elevated at 4.69  - started on empiric zosyn 10/31 and will continue  - blood cultures NGTD 11/3  - MRSA swabs negative  - repeat lactate in the am 11/3  - CXR 11/2 possible infiltrate in L lung (may be contusion as well)  - repeat UA 11/2  "without evidence of infection  - abdomen appears benign, LFT's fine  - WBC improved on 11/3 to 22.3     DM type II   PTA on Metformin 500 mg BID, Actos 30 mg and insulin pump.  hgb A1C at 7.7% on admission 10/31. PTA insulin dosing ~50-60 Units daily through pump.   - Holding PTA meds and insulin pump   - BS still running high, increase lantus to 20 units BID 11/3  - Moderate SSI   - Mod CHO diet     HTN   HLP   PTA Clonidine 0.3 mg BID, Propranolol 60 mg at HS, and Hyzaar losartan-hydrochlorothiazide 100- 25, lisinopril 40 mg daily. Also on lovastatin 20 mg at HS. Confirms was on both ACE I and ARB prior to admission  - hold Hyzaar, lisinopril.   - hold clonidine, propranolol given bradycardia overnight  - hold statin for now  - prn hydralazine    Anemia  Presumed 2/2 bleeding, following closely as above    FEN (fluids, electrolytes and nutrition): IV fluids, advancing diet as tolerated  Discussed with nursing.  DVT Prophylaxis: Pneumatic Compression Devices  Code Status: Full Code    Disposition: Expected discharge in 3-4 days pending improvement with injuries, ABBIE    Yahir Butler MD  [phone] (P)  Text Page    Interval History   Overnight events reviewed. States pain improved. Denies sob, CP over area of ribs. Ongoing N/V. Somewhat disoriented/ delirious asking \"do I have to go to class today\", \"is this [place]\". He's generally oriented to date    -Data reviewed today: I reviewed all new labs and imaging results over the last 24 hours. I personally reviewed the EKG tracing showing atrial fibrillation.    Physical Exam   Temp: 98.2  F (36.8  C) Temp src: Oral BP: (!) 163/90 Pulse: (!) 48 Heart Rate: (!) 48 Resp: 10 SpO2: 94 % O2 Device: Nasal cannula Oxygen Delivery: 3 LPM  Vitals:    10/31/19 0000 11/02/19 0532 11/03/19 0629   Weight: 88.5 kg (195 lb 1.7 oz) 92.5 kg (203 lb 14.8 oz) 95.2 kg (209 lb 14.4 oz)     Vital Signs with Ranges  Temp:  [97.4  F (36.3  C)-98.4  F (36.9  C)] 98.2  F (36.8 "  C)  Pulse:  [35-48] 48  Heart Rate:  [36-48] 48  Resp:  [10-22] 10  BP: ()/(39-92) 163/90  FiO2 (%):  [3 %] 3 %  SpO2:  [90 %-97 %] 94 %  I/O last 3 completed shifts:  In: 4660 [P.O.:460; I.V.:3200; IV Piggyback:1000]  Out: 625 [Urine:375; Chest Tube:250]    Constitutional: Alert, no distress, mild disorientation  Respiratory: Lungs largely clear on exam  Cardiovascular: bradycardic, regular. 1+ edema  GI: Soft, non-tender, non-disteneded, good bowel sounds. No RUQ tenderness  Skin/Integumen: No erythema, cyanosis  Other:      Medications     sodium chloride 100 mL/hr at 11/03/19 0656       sodium chloride 0.9%  500 mL Intravenous Once     insulin aspart  1-6 Units Subcutaneous Q4H     insulin glargine  15 Units Subcutaneous BID     piperacillin-tazobactam  3.375 g Intravenous Q6H     simvastatin  10 mg Oral At Bedtime     sodium chloride (PF)  3 mL Intracatheter Q8H     sodium chloride (PF)  3 mL Intracatheter Q8H       Data   Recent Labs   Lab 11/03/19  0558 11/02/19  1133 11/02/19  1010 11/02/19  0713 11/01/19  1806 11/01/19  0631 10/31/19  0744 10/30/19  1729   WBC 22.3*  --   --  28.2*  --  25.9* 26.9* 25.6*   HGB 8.0*  --   --  8.2* 8.5* 8.6* 10.4* 12.2*   MCV 89  --   --  89  --  89 90 90     --   --  257  --  224 213 262   INR  --   --   --   --   --   --  1.37* 1.40*     --   --  141  --  134 139 140   POTASSIUM 4.3  --   --  4.5  --  4.4 4.3 3.2*   CHLORIDE 108  --   --  107  --  101 102 104   CO2 24  --   --  24  --  25 28 31   BUN 64*  --   --  52*  --  47* 25 15   CR 3.51*  --   --  2.72*  --  2.99* 2.37* 1.09   ANIONGAP 8  --   --  10  --  8 9 5   LLOYD 7.7*  --   --  8.1*  --  8.1* 8.7 8.8   *  --   --  372*  --  355* 199* 117*   ALBUMIN  --   --  2.8*  --   --   --   --  3.6   PROTTOTAL  --   --  5.2*  --   --   --   --  6.3*   BILITOTAL  --   --  1.3  --   --   --   --  0.6   ALKPHOS  --   --  58  --   --   --   --  73   ALT  --   --  33  --   --   --   --  30   AST  --   --   49*  --   --   --   --  44   TROPI  --  0.057*  --   --   --   --   --   --        Recent Results (from the past 24 hour(s))   Echocardiogram Complete    Narrative    [id number]  [id number]  [id number]  [id number]^DOMINGUEZ^NAZANIN^PEYTON           [facility]  Echocardiography Laboratory  [address] [place] [address]        Name: CEDRIKC PHILLIPS  MRN: [id number]  : 1940  Study Date: 2019 04:10 PM  Age: 79 yrs  Gender: Male  Patient [facility]  Reason For Study: Bradycardia - Sinus  Ordering Physician: NAZANIN MIX  Referring Physician: NGHIA PATEL  Performed By: Jimi Lee RDCS     BSA: 2.1 m2  Height: 70 in  Weight: 205 lb  HR: 38  BP: 129/63 mmHg  _____________________________________________________________________________  __        Procedure  Complete Portable Echo Adult. Optison (NDC #9008-7902) given intravenously.  (Suboptimal images, abbreviated study performed).  _____________________________________________________________________________  __        Interpretation Summary     The rhythm was undetermined. It is likely junctional at 38 bpm.  Left ventricular systolic function is normal. The visual ejection fraction is  estimated at 55-60%. There is moderate concentric left ventricular  hypertrophy.  The left atrium is moderate to severely dilated. The right atrium is mild to  moderately dilated.  The right ventricle is mild to moderately dilated. The right ventricular  systolic function is mildly reduced.  There is mild to moderate (1-2+) tricuspid regurgitation. The right  ventricular systolic pressure is approximated at 27mmHg plus the right atrial  pressure. IVC diameter >2.1 cm collapsing <50% with sniff suggests a high RA  pressure estimated at 15 mmHg or greater.  Large left pleural effusion noted.  There is no comparison study available. The study was technically limited.  Contrast was used without apparent  complications.  _____________________________________________________________________________  __        Left Ventricle  The left ventricle is normal in size. There is moderate concentric left  ventricular hypertrophy. Left ventricular systolic function is normal. The  visual ejection fraction is estimated at 55-60%. Diastolic function not  assessed due to arrhythmia. No regional wall motion abnormalities noted.     Right Ventricle  The right ventricle is mild to moderately dilated. The right ventricle is not  well visualized. The right ventricular systolic function is mildly reduced.     Atria  The left atrium is moderate to severely dilated. The right atrium is mild to  moderately dilated. There is no atrial shunt seen.     Mitral Valve  The mitral valve is not well visualized. There is trace to mild mitral  regurgitation.        Tricuspid Valve  There is mild to moderate (1-2+) tricuspid regurgitation. The right  ventricular systolic pressure is approximated at 27mmHg plus the right atrial  pressure. IVC diameter >2.1 cm collapsing <50% with sniff suggests a high RA  pressure estimated at 15 mmHg or greater.     Aortic Valve  There is moderate trileaflet aortic sclerosis. There is trace aortic  regurgitation. No PW/CW - valve opening does not suggest moderate or severe  AS.     Pulmonic Valve  There is no pulmonic valvular stenosis.     Vessels  Normal size aorta.     Pericardium  The pericardium appears normal. Large left pleural effusion.        Rhythm  The rhythm was undetermined. It is likely junctional at 38 bpm.  _____________________________________________________________________________  __  MMode/2D Measurements & Calculations  IVSd: 1.4 cm     LVIDd: 4.4 cm  LVIDs: 3.1 cm  LVPWd: 1.4 cm  FS: 29.7 %  LV mass(C)d: 242.0 grams  LV mass(C)dI: 114.7 grams/m2  Ao root diam: 3.6 cm  LA dimension: 5.0 cm  asc Aorta Diam: 3.3 cm  LA/Ao: 1.4  RWT: 0.62        Doppler Measurements & Calculations  PA acc time:  0.08 sec  TR max twin: 262.0 cm/sec  TR max P.5 mmHg              _____________________________________________________________________________  __        Report approved by: Momo Stafford 2019 10:44 PM      XR Chest Port 1 View    Narrative    XR CHEST PORTABLE 1 VIEW   11/3/2019 12:05 AM     HISTORY: Chest tube out.    COMPARISON: 2019.    FINDINGS: Upright portable chest. The left chest tube has been  removed. There is no pneumothorax. The heart is enlarged. No pulmonary  edema. The thoracic aorta is calcified. There is mild right basilar  probable atelectasis. The lungs are otherwise clear. Multiple  posterior left rib fractures.      Impression    IMPRESSION: No pneumothorax post chest tube removal.    ELMER SAENZ MD

## 2019-11-04 ENCOUNTER — APPOINTMENT (OUTPATIENT)
Dept: GENERAL RADIOLOGY | Facility: CLINIC | Age: 79
DRG: 963 | End: 2019-11-04
Attending: THORACIC SURGERY (CARDIOTHORACIC VASCULAR SURGERY)
Payer: COMMERCIAL

## 2019-11-04 LAB
ANION GAP SERPL CALCULATED.3IONS-SCNC: 8 MMOL/L (ref 3–14)
BUN SERPL-MCNC: 66 MG/DL (ref 7–30)
CALCIUM SERPL-MCNC: 7.6 MG/DL (ref 8.5–10.1)
CHLORIDE SERPL-SCNC: 116 MMOL/L (ref 94–109)
CO2 SERPL-SCNC: 23 MMOL/L (ref 20–32)
CREAT SERPL-MCNC: 3.14 MG/DL (ref 0.66–1.25)
CREAT UR-MCNC: 101 MG/DL
ERYTHROCYTE [DISTWIDTH] IN BLOOD BY AUTOMATED COUNT: 16.2 % (ref 10–15)
GFR SERPL CREATININE-BSD FRML MDRD: 18 ML/MIN/{1.73_M2}
GLUCOSE BLDC GLUCOMTR-MCNC: 133 MG/DL (ref 70–99)
GLUCOSE BLDC GLUCOMTR-MCNC: 148 MG/DL (ref 70–99)
GLUCOSE BLDC GLUCOMTR-MCNC: 158 MG/DL (ref 70–99)
GLUCOSE BLDC GLUCOMTR-MCNC: 171 MG/DL (ref 70–99)
GLUCOSE SERPL-MCNC: 149 MG/DL (ref 70–99)
HCT VFR BLD AUTO: 28.9 % (ref 40–53)
HGB BLD-MCNC: 9.6 G/DL (ref 13.3–17.7)
LACTATE BLD-SCNC: 1.7 MMOL/L (ref 0.7–2)
LACTATE BLD-SCNC: 2.6 MMOL/L (ref 0.7–2)
MCH RBC QN AUTO: 29.4 PG (ref 26.5–33)
MCHC RBC AUTO-ENTMCNC: 33.2 G/DL (ref 31.5–36.5)
MCV RBC AUTO: 89 FL (ref 78–100)
MICROALBUMIN UR-MCNC: 376 MG/L
MICROALBUMIN/CREAT UR: 372.28 MG/G CR (ref 0–17)
PLATELET # BLD AUTO: 263 10E9/L (ref 150–450)
POTASSIUM SERPL-SCNC: 3.5 MMOL/L (ref 3.4–5.3)
RBC # BLD AUTO: 3.26 10E12/L (ref 4.4–5.9)
SODIUM SERPL-SCNC: 147 MMOL/L (ref 133–144)
WBC # BLD AUTO: 24.7 10E9/L (ref 4–11)

## 2019-11-04 PROCEDURE — 71046 X-RAY EXAM CHEST 2 VIEWS: CPT

## 2019-11-04 PROCEDURE — 12000047 ZZH R&B IMCU

## 2019-11-04 PROCEDURE — 99232 SBSQ HOSP IP/OBS MODERATE 35: CPT | Performed by: INTERNAL MEDICINE

## 2019-11-04 PROCEDURE — 36415 COLL VENOUS BLD VENIPUNCTURE: CPT | Performed by: INTERNAL MEDICINE

## 2019-11-04 PROCEDURE — 25000128 H RX IP 250 OP 636: Performed by: INTERNAL MEDICINE

## 2019-11-04 PROCEDURE — 25000132 ZZH RX MED GY IP 250 OP 250 PS 637: Performed by: INTERNAL MEDICINE

## 2019-11-04 PROCEDURE — 00000146 ZZHCL STATISTIC GLUCOSE BY METER IP

## 2019-11-04 PROCEDURE — 85027 COMPLETE CBC AUTOMATED: CPT | Performed by: INTERNAL MEDICINE

## 2019-11-04 PROCEDURE — 82043 UR ALBUMIN QUANTITATIVE: CPT | Performed by: INTERNAL MEDICINE

## 2019-11-04 PROCEDURE — 83605 ASSAY OF LACTIC ACID: CPT | Performed by: INTERNAL MEDICINE

## 2019-11-04 PROCEDURE — 80048 BASIC METABOLIC PNL TOTAL CA: CPT | Performed by: INTERNAL MEDICINE

## 2019-11-04 PROCEDURE — 25000131 ZZH RX MED GY IP 250 OP 636 PS 637: Performed by: INTERNAL MEDICINE

## 2019-11-04 PROCEDURE — 25800030 ZZH RX IP 258 OP 636: Performed by: INTERNAL MEDICINE

## 2019-11-04 PROCEDURE — 12000000 ZZH R&B MED SURG/OB

## 2019-11-04 PROCEDURE — 99207 ZZC CDG-MDM COMPONENT: MEETS LOW - DOWN CODED: CPT | Performed by: INTERNAL MEDICINE

## 2019-11-04 RX ORDER — VERAPAMIL HYDROCHLORIDE 120 MG/1
120 TABLET, FILM COATED, EXTENDED RELEASE ORAL AT BEDTIME
Status: DISCONTINUED | OUTPATIENT
Start: 2019-11-04 | End: 2019-11-10 | Stop reason: HOSPADM

## 2019-11-04 RX ORDER — PANTOPRAZOLE SODIUM 40 MG/1
40 TABLET, DELAYED RELEASE ORAL
Status: DISCONTINUED | OUTPATIENT
Start: 2019-11-04 | End: 2019-11-10 | Stop reason: HOSPADM

## 2019-11-04 RX ORDER — POLYETHYLENE GLYCOL 3350 17 G/17G
17 POWDER, FOR SOLUTION ORAL 2 TIMES DAILY PRN
Status: DISCONTINUED | OUTPATIENT
Start: 2019-11-04 | End: 2019-11-10 | Stop reason: HOSPADM

## 2019-11-04 RX ADMIN — INSULIN ASPART 1 UNITS: 100 INJECTION, SOLUTION INTRAVENOUS; SUBCUTANEOUS at 21:09

## 2019-11-04 RX ADMIN — PIPERACILLIN SODIUM,TAZOBACTAM SODIUM 2.25 G: 2; .25 INJECTION, POWDER, FOR SOLUTION INTRAVENOUS at 03:18

## 2019-11-04 RX ADMIN — VERAPAMIL HYDROCHLORIDE 120 MG: 120 TABLET, FILM COATED, EXTENDED RELEASE ORAL at 21:05

## 2019-11-04 RX ADMIN — INSULIN ASPART 1 UNITS: 100 INJECTION, SOLUTION INTRAVENOUS; SUBCUTANEOUS at 17:30

## 2019-11-04 RX ADMIN — HYDRALAZINE HYDROCHLORIDE 10 MG: 20 INJECTION INTRAMUSCULAR; INTRAVENOUS at 02:06

## 2019-11-04 RX ADMIN — PIPERACILLIN SODIUM,TAZOBACTAM SODIUM 2.25 G: 2; .25 INJECTION, POWDER, FOR SOLUTION INTRAVENOUS at 08:31

## 2019-11-04 RX ADMIN — SODIUM CHLORIDE: 9 INJECTION, SOLUTION INTRAVENOUS at 18:37

## 2019-11-04 RX ADMIN — OXYCODONE HYDROCHLORIDE 5 MG: 5 TABLET ORAL at 22:54

## 2019-11-04 RX ADMIN — SENNOSIDES AND DOCUSATE SODIUM 1 TABLET: 8.6; 5 TABLET ORAL at 03:48

## 2019-11-04 RX ADMIN — INSULIN GLARGINE 20 UNITS: 100 INJECTION, SOLUTION SUBCUTANEOUS at 08:32

## 2019-11-04 RX ADMIN — OXYCODONE HYDROCHLORIDE 5 MG: 5 TABLET ORAL at 18:38

## 2019-11-04 RX ADMIN — ONDANSETRON 4 MG: 2 INJECTION INTRAMUSCULAR; INTRAVENOUS at 02:10

## 2019-11-04 RX ADMIN — SENNOSIDES AND DOCUSATE SODIUM 1 TABLET: 8.6; 5 TABLET ORAL at 21:05

## 2019-11-04 RX ADMIN — METOCLOPRAMIDE 5 MG: 5 INJECTION, SOLUTION INTRAMUSCULAR; INTRAVENOUS at 03:25

## 2019-11-04 RX ADMIN — SIMVASTATIN 10 MG: 10 TABLET, FILM COATED ORAL at 21:05

## 2019-11-04 RX ADMIN — INSULIN ASPART 1 UNITS: 100 INJECTION, SOLUTION INTRAVENOUS; SUBCUTANEOUS at 08:32

## 2019-11-04 RX ADMIN — HYDRALAZINE HYDROCHLORIDE 10 MG: 20 INJECTION INTRAMUSCULAR; INTRAVENOUS at 21:18

## 2019-11-04 RX ADMIN — INSULIN GLARGINE 20 UNITS: 100 INJECTION, SOLUTION SUBCUTANEOUS at 21:09

## 2019-11-04 RX ADMIN — SODIUM CHLORIDE: 9 INJECTION, SOLUTION INTRAVENOUS at 04:51

## 2019-11-04 RX ADMIN — OXYCODONE HYDROCHLORIDE 5 MG: 5 TABLET ORAL at 00:38

## 2019-11-04 RX ADMIN — INSULIN ASPART 3 UNITS: 100 INJECTION, SOLUTION INTRAVENOUS; SUBCUTANEOUS at 00:35

## 2019-11-04 RX ADMIN — INSULIN ASPART 1 UNITS: 100 INJECTION, SOLUTION INTRAVENOUS; SUBCUTANEOUS at 03:50

## 2019-11-04 RX ADMIN — OXYCODONE HYDROCHLORIDE 5 MG: 5 TABLET ORAL at 04:39

## 2019-11-04 RX ADMIN — PIPERACILLIN SODIUM,TAZOBACTAM SODIUM 2.25 G: 2; .25 INJECTION, POWDER, FOR SOLUTION INTRAVENOUS at 15:14

## 2019-11-04 RX ADMIN — PIPERACILLIN SODIUM,TAZOBACTAM SODIUM 2.25 G: 2; .25 INJECTION, POWDER, FOR SOLUTION INTRAVENOUS at 21:05

## 2019-11-04 ASSESSMENT — ACTIVITIES OF DAILY LIVING (ADL)
ADLS_ACUITY_SCORE: 15

## 2019-11-04 ASSESSMENT — MIFFLIN-ST. JEOR: SCORE: 1669.25

## 2019-11-04 NOTE — PROGRESS NOTES
Sepsis Evaluation Progress Note    I was called to see Tc Garcia due to abnormal vital signs triggering the Sepsis SIRS screening alert. He is known to have an infection.     Physical Exam   Vital Signs:  Temp: 98.2  F (36.8  C) Temp src: Oral BP: (!) 174/171 Pulse: 79 Heart Rate: 76 Resp: 16 SpO2: 100 % O2 Device: Nasal cannula Oxygen Delivery: 3 LPM    Lab:  Lactic Acid   Date Value Ref Range Status   11/03/2019 2.3 (H) 0.7 - 2.0 mmol/L Final     Lactate for Sepsis Protocol   Date Value Ref Range Status   11/04/2019 2.6 (H) 0.7 - 2.0 mmol/L Final     Comment:     Significant value called to and read back by  MICHAEL CONDON RN IN [id number] RK         The patient has signs of altered level of consciousness suspicious for multifactorial delirium.     Assessment & Plan   NO EVIDENCE OF SEPSIS at this time.  Vital sign, physical exam, and lab findings are likely due to AKA and ABBIE.    Disposition: The patient will remain on the current unit. We will continue to monitor this patient closely.  Jb Sorto MD    Sepsis Criteria   Sepsis: 2+ SIRS criteria due to infection  Severe Sepsis: Sepsis AND 1+ new sign of acute organ dysfunction (Note: lactate >2 is organ dysfunction)  Septic Shock: Sepsis AND hypotension despite volume resuscitation with 30 ml/kg crystalloid

## 2019-11-04 NOTE — PROGRESS NOTES
Renal Medicine Progress Note                                Tc Garcia MRN# [id number]   Age: 79 year old YOB: 1940   Date of Admission: 10/30/2019 Hospital LOS: 4                  Assessment/Plan:     Admitted post fall.  Seen regarding acute kidney injury      1.  Baseline creatinine 1.0 mg/dl range   -CKD stage 2 ?  2.  Dipstick proteinuria   3.  ARF   -non oliguric   -US non diagnostic   -no IV contrast    -IV diuretic 11/03   -presumed ATN  4.  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  HTN   -moderate   6.  Hematuria   -low suspicion for acute GN      Reduce IVF rate  Albumin/creatinine ratio  Repeat UA next 4 to 6 weeks  Should not be on both ACE and ARB  Restart ACE when renal function normalizes    Avoid nephrotoxins  No diuretic     Interval History:     Up at bedside  IO and UO reviewed  Labs reviewed    Complaining of swallowing issues  No CP or SOB       ROS:     GENERAL: NAD, No fever,chills  R: NEGATIVE for significant cough or SOB  CV: NEGATIVE for chest pain, palpitations  GI: dysphagia  EXT: no change edema  ROS otherwise negative    Medications and Allergies:     Reviewed    Physical Exam:     Vitals were reviewed  Patient Vitals for the past 8 hrs:   BP Temp Temp src Pulse Heart Rate Resp SpO2 Weight   11/04/19 0830 (!) 133/92 -- -- 82 79 11 100 % --   11/04/19 0800 (!) 130/113 -- -- 84 84 23 94 % --   11/04/19 0745 -- 98.7  F (37.1  C) Oral -- -- -- -- --   11/04/19 0630 -- -- -- -- -- -- -- 95.3 kg (210 lb 1.6 oz)   11/04/19 0544 -- 98.2  F (36.8  C) -- -- -- -- -- --   11/04/19 0317 (!) 179/92 -- -- 76 76 19 100 % --   11/04/19 0300 (!) 174/171 -- -- 79 -- -- -- --   11/04/19 0230 (!) 161/87 -- -- 76 76 16 100 % --     I/O last 3 completed shifts:  In: 3280 [P.O.:880; I.V.:2400]  Out: 1275 [Urine:1275]    Vitals:    10/30/19 2100 10/31/19 0000 11/02/19 0532 11/03/19 0629   Weight: 88.5 kg (195 lb 1.7 oz) 88.5 kg (195 lb 1.7 oz) 92.5 kg (203 lb 14.8 oz) 95.2 kg (209 lb 14.4  oz)    11/04/19 0630   Weight: 95.3 kg (210 lb 1.6 oz)         GENERAL: awake, alert, follows  HEENT: NC/AT, PERRLA, EOMI, non icteric, pharynx moist without lesion  RESP:  clear anteriorly  CV: RRR, normal S1 S2  ABDOMEN: soft, nontender, no HSM or masses and bowel sounds normal  MS: no clubbing, cyanosis   SKIN: clear without significant rashes or lesions  NEURO: speech normal and cranial nerves 2-12 intact  PSYCH: affect flat  EXT: trace edema    Data:     Recent Labs   Lab 11/04/19  0640 11/03/19  0558 11/02/19  0713 11/01/19  0631   * 140 141 134   POTASSIUM 3.5 4.3 4.5 4.4   CHLORIDE 116* 108 107 101   CO2 23 24 24 25   ANIONGAP 8 8 10 8   * 256* 372* 355*   BUN 66* 64* 52* 47*   CR 3.14* 3.51* 2.72* 2.99*   GFRESTIMATED 18* 15* 21* 19*   GFRESTBLACK 20* 18* 24* 22*   LLOYD 7.6* 7.7* 8.1* 8.1*         Recent Labs   Lab Test 11/04/19  0640 11/03/19  0558 11/02/19  0713 11/01/19  0631 10/31/19  0744 10/30/19  1729   CR 3.14* 3.51* 2.72* 2.99* 2.37* 1.09     Recent Labs   Lab 11/02/19  1010 10/30/19  1729   ALBUMIN 2.8* 3.6     Recent Labs   Lab 11/04/19  0640 11/03/19  0558 11/02/19  0713 11/01/19  1806   HGB 9.6* 8.0* 8.2* 8.5*     Recent Labs   Lab 11/02/19  1400   COLOR Red   APPEARANCE Cloudy   URINEGLC 300*   URINEBILI Negative   URINEKETONE 10*   SG 1.021   UBLD Large*   URINEPH 5.5   PROTEIN 30*   NITRITE Negative   LEUKEST Trace*   RBCU >182*   WBCU 1         G Tank Cooper MD    [facility] - Nephrology  [phone]

## 2019-11-04 NOTE — PLAN OF CARE
Patient is A&O X 3, elevated BP treated with hydralazine, monitor shows SR. 97% on 3liters lungs diminished. Up with 1 assist.  C/o ribcage pain oxycodone for pain. Zofran for nausea. Senna for constipation.  LA elevated reported to MD- no change to plan.

## 2019-11-04 NOTE — CONSULTS
[facility]    RENAL CONSULTATION NOTE    REFERRING MD:  Dr. Yahir Butler    REASON FOR CONSULTATION:  ABBIE, worsening anemia    DATE OF CONSULTATION: 11/03/19    SHORTHAND KEY FOR MY NOTES:  c = with, s = without, p = after, a = before, x = except, asx = asymptomatic, tx = transplant or treatment, sx = symptoms or symptomatic, cx = canceled or culture, rxn = reaction, yday = yesterday, nl = normal, abx = antibiotics, fxn = function, dx = diagnosis, dz = disease, m/h = melena/hematochezia, c/d/l/ha = cramping/dizziness/lightheadedness/headache, d/c = discharge or diarrhea/constipation, f/c/n/v = fevers/chills/nausea/vomiting, cp/sob = chest pain/shortness of breath, tbv = total body volume, rxn = reaction, tdc = tunneled dialysis catheter, pta = prior to admission, hd = hemodialysis, pd = peritoneal dialysis, hhd = home hemodialysis    HPI: Tc Garcia is a 79 year old male c DM2/HTN who was admitted on 10/30/2019 p a fall and has subsequently developed ABBIE.    Pt fell down the stairs p tripping on a slipper.  He hit his head/shoulder on the wall.  Pt denies being dizzy or lightheaded before the fall and did not have any LOC.   He immediately had some cp and L shoulder pain and was brought to the ER for further eval.  Of note, the pt is on Eliquis for afib.    In the ER, he was found to have an acute c3 spinous process fx, multiple broken ribs and a L hemothorax.  A chest tube was placed c 850 ml of blood removed right away.  Labs were drawn that showed a cr of 1.1 initially.    Over the course of the admission, the pt's cr has risen steadily to 3.5 today.  He is making urine and denies any prostatic sx.  No f/c/n/v/itching.  Breathing ok, though it's a bit painful c the rib fx.  His L side is painful.  He is eating a bit, but no metallic taste.    ROS:  A complete review of systems was performed and is x as noted above.    PMH:    Past Medical History:   Diagnosis Date     Diabetic PROTEINURIA       Mixed hyperlipidemia      Personal history of colonic polyps 1972    gets colonoscopy every five years, due in      Rosacea      Type II or unspecified type diabetes mellitus without mention of complication, not stated as uncontrolled      Unspecified essential hypertension      PSH:    Past Surgical History:   Procedure Laterality Date     HC REMOVE TONSILS/ADENOIDS,<12 Y/O      T & A <11y.o.     MEDICATIONS:      sodium chloride 0.9%  500 mL Intravenous Once     insulin aspart  1-6 Units Subcutaneous Q4H     insulin glargine  20 Units Subcutaneous BID     piperacillin-tazobactam  2.25 g Intravenous Q6H     simvastatin  10 mg Oral At Bedtime     sodium chloride (PF)  3 mL Intracatheter Q8H     sodium chloride (PF)  3 mL Intracatheter Q8H     ALLERGIES:    Allergies as of 10/30/2019 - Reviewed 10/30/2019   Allergen Reaction Noted     No known drug allergies  2004     FH:    Family History   Problem Relation Age of Onset     Family History Negative Mother          age 71     Family History Negative Father          age 70     Diabetes Brother         alive age 77     Diabetes Brother         alive age 76     Family History Negative Brother              Family History Negative Brother              Diabetes Sister         alive age74     Family History Negative Sister          age 86     Heart Disease Sister              Family History Negative Sister              Family History Negative Sister              Diabetes Sister      Diabetes Sister      Diabetes Brother      Diabetes Brother      SH:    Social History     Socioeconomic History     Marital status:      Spouse name: Lianna     Number of children: 4     Years of education: 16     Highest education level: Not on file   Occupational History     Occupation: COURRIER     Employer: [employer]    Social Needs     Financial resource strain: Not on  "file     Food insecurity:     Worry: Not on file     Inability: Not on file     Transportation needs:     Medical: Not on file     Non-medical: Not on file   Tobacco Use     Smoking status: Former Smoker     Packs/day: 0.20     Years: 40.00     Pack years: 8.00     Types: Cigarettes     Last attempt to quit: 2008     Years since quittin.8     Smokeless tobacco: Never Used     Tobacco comment: occasional   Substance and Sexual Activity     Alcohol use: Yes     Alcohol/week: 35.0 standard drinks     Comment: occasional     Drug use: Not on file     Sexual activity: Not on file   Lifestyle     Physical activity:     Days per week: Not on file     Minutes per session: Not on file     Stress: Not on file   Relationships     Social connections:     Talks on phone: Not on file     Gets together: Not on file     Attends Jainism service: Not on file     Active member of club or organization: Not on file     Attends meetings of clubs or organizations: Not on file     Relationship status: Not on file     Intimate partner violence:     Fear of current or ex partner: Not on file     Emotionally abused: Not on file     Physically abused: Not on file     Forced sexual activity: Not on file   Other Topics Concern     Not on file   Social History Narrative     Not on file     PHYSICAL EXAM:    BP (!) 166/101   Pulse 57   Temp 97.6  F (36.4  C) (Oral)   Resp 16   Ht 1.778 m (5' 10\")   Wt 95.2 kg (209 lb 14.4 oz)   SpO2 98%   BMI 30.12 kg/m      GENERAL: awake, alert, NAD  HEENT:  normocephalic, no gross abnormalities; dry mouth; pupils equal, EOMI, no scleral icterus or conj edema  CV: RRR c 1/6 murmurs, no clicks, gallops, or rubs, tr ble edema  RESP: CTA B c good efforts  GI: abd o/s/nt/nd, BS present; no masses; + insulin pump  : nl male genitalia  MUSCULOSKELETAL: extremities nl - no gross deformities noted  SKIN: + large hematoma / bruising on L side / back; tender to palpation  NEURO:  strength normal and " symmetric  PSYCH: mood good, affect appropriate  LYMPH: no palpable ant/post cervical and supraclavicular adenopathy  OTHER:  + PIV    LABS:      CBC RESULTS:     Recent Labs   Lab 11/03/19  0558 11/02/19  0713 11/01/19  1806 11/01/19  0631 10/31/19  0744 10/30/19  1729   WBC 22.3* 28.2*  --  25.9* 26.9* 25.6*   RBC 2.72* 2.89*  --  3.00* 3.58* 4.28*   HGB 8.0* 8.2* 8.5* 8.6* 10.4* 12.2*   HCT 24.1* 25.6*  --  26.6* 32.1* 38.3*    257  --  224 213 262     BMP RESULTS:  Recent Labs   Lab 11/03/19  0558 11/02/19  0713 11/01/19  0631 10/31/19  0744 10/30/19  1729    141 134 139 140   POTASSIUM 4.3 4.5 4.4 4.3 3.2*   CHLORIDE 108 107 101 102 104   CO2 24 24 25 28 31   BUN 64* 52* 47* 25 15   CR 3.51* 2.72* 2.99* 2.37* 1.09   * 372* 355* 199* 117*   LLOYD 7.7* 8.1* 8.1* 8.7 8.8     INR  Recent Labs   Lab 10/31/19  0744 10/30/19  1729   INR 1.37* 1.40*      DIAGNOSTICS:  Personally reviewed renal US - ~10 cm kidneys B, no hydro    A/P:  Tc Garcia is a 79 year old male who has ABBIE, anemia s/p fall.    1.  ABBIE 2 ATN.  Pt's cr has steadily worsened through the admission. He doesn't appear to be very dry and is receiving IVF.  No major uremic sx at present.  His CK was slightly elevated, but not bad.  The UA showed red blood, but that may have been from the trauma.  US didn't show any obst.  BP has been ok, but hb is drifting down, so could have some perfusion issues contributing.  A.  Follow labs, uo, sx.  B.  Continue IVF.  C.  Avoid nephrotoxics.    2.  Anemia. Hb continues to trend down.  He was on Eliquis and has a massive hematoma on the L side of his body.  A.  Follow hb, clinically.  B.  Transfuse prn.    3.  HTN.  Pt's BP is variable.  His home meds (ACEI and ARB) are on hold.  A.  Hold BP meds for now.  B.  Use prn hydral.  C.  When renal fxn improves, resume ACEI.      4.  Trauma.  Pt is being followed by multiple surgical services.  A.  Pain meds prn.    5.  FEN.  Electrolytes are ok.  Ca  corrects for low alb.  A.  Same diet.    Thank you for this consultation. We will follow c you.  Please call if any questions.    Attestation:   I have reviewed today's relevant vital signs, notes, medications, labs and imaging.    Gary Chandler MD  [facility] - Nephrology  [phone]

## 2019-11-04 NOTE — PROVIDER NOTIFICATION
Upon assessment, inward chest wall movement noted to left lower ribcage with inspiration, outward movement on expiration. Patient stable on 3L NC, states LUCIANO, up in chair eating breakfast. PA notified.

## 2019-11-04 NOTE — PROGRESS NOTES
[facility]    Hospitalist Progress Note    Date of Service (when I saw the patient): 11/04/2019    Assessment & Plan    Tc Garcia is a 79 year old male with a history of atrial fibrillation, DM type II, HTN who presented to the ED on 10/30/2019 after fall down the stairs sustaining a C3 spinous process fracture, fracture of T2, multiple left sided rib fractures and moderate left hemothorax     Mechanical fall  Multiple left sided rib fractures   Moderate left hemothorax   C3 spinous process fracture  Fracture of T2  Anemia  Sustained after a fall down the stairs. No dizziness or LOC. No chest pain or palpitations. Numerous fractures (L rib fractures C3 spinous process fx, T2 acute fracure) seen on CT scans of the cervical spine and chest.  Of note CT scan of the head did not show any evidence of bleeding.  In the ED a chest tube was placed as well   - Trauma service ok'd ambulation with assistance  - Ortho spine notes cervical and thoracic fracture stable and ok to mobilize as tolerated with restrictions based on other injuries, no bracing necessary  - self pulled chest tube overnight 11/2-3, stable without 11/4  - transfuse Hgb < 7. Transfusion consent is in chart  - hgb stable 11/4 at 9.6    Delirium  Intermittent, pulled IV's, chest tube out overnight. 11/3 morning appropriate but still with mild confusion. Neuro nonfocal.   - largely resolved    Alcohol use/ abuse disorder  Has longstanding h/o heavy Etoh use (per wife).  No signs of withdrawal. ? Contributing to fall but wife doesn't think that he was drinking at the time of the fall.   - no signs of withdrawal    ABBIE  Creatinine on admission 10/30 at 1.09. [place] to 2.37 on 10/31. UOP  Sluggish, 90 ml 10/31. PTA on losartan-hydrochlorothiazide, lisinopril (ACE I and ARB). No noted hypotensive episodes initially but some hypotension to 80-90's systolic during stay as well as bradycardia. ? If relative hypotension with fall and ACEI/ ARB on  board. CK sl elevated but not enough to explain. Likely ATN.  - UA remarkable for hematuria (RBC>182), dipstick protein 30, 2 WBC  - avoid nephrotoxins  - IV fluids  - renal US with small R renal cyst, no obstruction  - hold ACE I and ARB, would only take either ACE I or ARB in future  - nephrology following, appreciate assistance  - UOP increasing 11/4, creat improved (3.51->3.14)    Bradycardia  Atrial fibrillation   Elevated BNP  Normally anticoagulated on Eliquis.  EKG in the ED showed sinus bradycardia. 11/2-11/3 harrison to 30-40's  - Holding Eliquis  - discontinue clonidine, propranolol, verapamil with bradycardia (and mild hypotension)  - continue telemetry  - I'm suspicious with renal failure that drugs may have not been cleared causing bradycardia  - bradycardia resolved, will cautiously add back verapamil on 11/4 at 120 mg daily    Leukocytosis  Admit WBC at 25.6->26.9->25.9->28.2 11/2. Mild lactic acidosis on admission  - procalcitonin quite elevated at 4.69  - started on empiric zosyn 10/31 and will continue  - blood cultures NGTD 11/4  - MRSA swabs negative  - lactate normalized 1.7  - CXR 11/4 relatively unremarkable, ? Small infiltrate L base  - repeat UA 11/2 without evidence of infection  - abdomen appears benign, LFT's fine  - WBC stable 11/4 at 24.7     DM type II   PTA on Metformin 500 mg BID, Actos 30 mg and insulin pump.  hgb A1C at 7.7% on admission 10/31. PTA insulin dosing ~50-60 Units daily through pump.   - Holding PTA meds and insulin pump   - lantus to 20 units BID 11/3-> improved blood sugars  - Moderate SSI   - Mod CHO diet     HTN   HLP   PTA Clonidine 0.3 mg BID, Propranolol 60 mg at HS, and Hyzaar losartan-hydrochlorothiazide 100- 25, lisinopril 40 mg daily. Also on lovastatin 20 mg at HS. Confirms was on both ACE I and ARB prior to admission  - hold Hyzaar, lisinopril.   - hold clonidine, propranolol given bradycardia   - restarted verapamil 120 mg at HS 11/4  - hold statin for  now  - prn hydralazine    Anemia  Presumed 2/2 bleeding, following closely as above    FEN (fluids, electrolytes and nutrition): IV fluids, advancing diet as tolerated  Discussed with nursing.  DVT Prophylaxis: Pneumatic Compression Devices  Code Status: Full Code    Disposition: Expected discharge in 3-4 days pending improvement with injuries, ABBIE    Yahir Butler MD  [phone] (P)  Text Page    Interval History   Overnight events reviewed. Doing much better. Rib pain markedly improved. Denies cp/sob. No BM. Ambulating in hallway    -Data reviewed today: I reviewed all new labs and imaging results over the last 24 hours. I personally reviewed the EKG tracing showing atrial fibrillation.    Physical Exam   Temp: 97.7  F (36.5  C) Temp src: Oral BP: (!) 162/95 Pulse: 74 Heart Rate: 79 Resp: 17 SpO2: 90 % O2 Device: Nasal cannula Oxygen Delivery: 3 LPM  Vitals:    11/02/19 0532 11/03/19 0629 11/04/19 0630   Weight: 92.5 kg (203 lb 14.8 oz) 95.2 kg (209 lb 14.4 oz) 95.3 kg (210 lb 1.6 oz)     Vital Signs with Ranges  Temp:  [97.7  F (36.5  C)-98.7  F (37.1  C)] 97.7  F (36.5  C)  Pulse:  [59-84] 74  Heart Rate:  [60-85] 79  Resp:  [7-26] 17  BP: (127-193)/() 162/95  SpO2:  [63 %-100 %] 90 %  I/O last 3 completed shifts:  In: 2826.67 [P.O.:480; I.V.:2346.67]  Out: 1525 [Urine:1525]    Constitutional: Alert, no distress, mild disorientation  Respiratory:lungs clear with exception of crackles at L base  Cardiovascular: irregular, normal rate. 1+ edema  GI: Soft, non-tender, non-disteneded, good bowel sounds. No RUQ tenderness  Skin/Integumen: No erythema, cyanosis  Other:      Medications     sodium chloride 75 mL/hr at 11/04/19 1034       sodium chloride 0.9%  500 mL Intravenous Once     insulin aspart  1-6 Units Subcutaneous Q4H     insulin glargine  20 Units Subcutaneous BID     piperacillin-tazobactam  2.25 g Intravenous Q6H     simvastatin  10 mg Oral At Bedtime     sodium chloride (PF)  3 mL  Intracatheter Q8H     sodium chloride (PF)  3 mL Intracatheter Q8H       Data   Recent Labs   Lab 11/04/19  0640 11/03/19  0558 11/02/19  1133 11/02/19  1010 11/02/19  0713  10/31/19  0744 10/30/19  1729   WBC 24.7* 22.3*  --   --  28.2*   < > 26.9* 25.6*   HGB 9.6* 8.0*  --   --  8.2*   < > 10.4* 12.2*   MCV 89 89  --   --  89   < > 90 90    201  --   --  257   < > 213 262   INR  --   --   --   --   --   --  1.37* 1.40*   * 140  --   --  141   < > 139 140   POTASSIUM 3.5 4.3  --   --  4.5   < > 4.3 3.2*   CHLORIDE 116* 108  --   --  107   < > 102 104   CO2 23 24  --   --  24   < > 28 31   BUN 66* 64*  --   --  52*   < > 25 15   CR 3.14* 3.51*  --   --  2.72*   < > 2.37* 1.09   ANIONGAP 8 8  --   --  10   < > 9 5   LLOYD 7.6* 7.7*  --   --  8.1*   < > 8.7 8.8   * 256*  --   --  372*   < > 199* 117*   ALBUMIN  --   --   --  2.8*  --   --   --  3.6   PROTTOTAL  --   --   --  5.2*  --   --   --  6.3*   BILITOTAL  --   --   --  1.3  --   --   --  0.6   ALKPHOS  --   --   --  58  --   --   --  73   ALT  --   --   --  33  --   --   --  30   AST  --   --   --  49*  --   --   --  44   TROPI  --   --  0.057*  --   --   --   --   --     < > = values in this interval not displayed.       Recent Results (from the past 24 hour(s))   XR Chest 2 Views    Narrative    CHEST TWO VIEWS November 4, 2019 6:17 AM     HISTORY: Hemothorax.    COMPARISON: November 2, 2019.       Impression    IMPRESSION: No change in left-sided rib fractures. Question trace  residual atelectasis and/or infiltrate at the left base, lungs  otherwise clear. There are no acute infiltrates. The cardiac  silhouette is not enlarged. Pulmonary vasculature is unremarkable.    ALLYSON MARTINEZ MD

## 2019-11-04 NOTE — PLAN OF CARE
VSS, denies pain, up SBA, ambulating in room, IS and ambulation encouraged, up to chair for several hours, refused PT, education provided, voiding, urine dark izabel, IVF maintained, antibiotics continue. Lungs diminished, noted left side chest flailing. PA aware. 3LNC 96%, LUCIANO, tolerating clear liquids, c/o poor appetite and intermittent nausea, +BS, +gas. Moderate LE edema. Significant bruising to left side, unchanged, Dressing to L upper back CDI. Tele, A-Fib/Flutter, controlled.

## 2019-11-04 NOTE — PLAN OF CARE
PT- Attempted to see pt this AM. Pt stated that he does not feel well right now and does not want to do any therapy. After multiple attempts pt declined. Encouraged pt to get up with nursing staff a couple of times today, pt agreeable.

## 2019-11-04 NOTE — PLAN OF CARE
A&O, VSS ex Bradycardia, HTN at times & 3L O2. PRN hydralazine given x1. Lung sounds diminished. Bowel sounds active, +flatus. Previous Left CT site- CDI. Significant left abdomen bruising. Adequate urine output. Ambulates assist x1.  Tolerating CHO diet. Pain controlled by PRN oxycodone. Tele: Sinus harrison.

## 2019-11-04 NOTE — PROGRESS NOTES
"Thoracic Surgery:    BP (!) 133/92   Pulse 82   Temp 97.7  F (36.5  C) (Oral)   Resp 11   Ht 1.778 m (5' 10\")   Wt 95.3 kg (210 lb 1.6 oz)   SpO2 100%   BMI 30.15 kg/m    On 3 L  CXR: lungs expanded, no hemothorax on left, same multiple left rib fractures, OK  S: States he walked around RN station yesterday without much SOB-- feels more fatigued today. No pain. Coached him on proper IS use.  O: Left flank: extensive ecchymosis.  No hematoma at previous CT site  Some movement detected at left rib fracture sites-- some possible mild flailing   P: No surgical intervention recommended unless patient decompensates significantly from a resp standpoint. Needs aggressive IS use- walking    Estrella Giraldo PA-C with Dr. Warren Rodriguez  [facility]  Cell [phone]      "

## 2019-11-05 ENCOUNTER — APPOINTMENT (OUTPATIENT)
Dept: PHYSICAL THERAPY | Facility: CLINIC | Age: 79
DRG: 963 | End: 2019-11-05
Payer: COMMERCIAL

## 2019-11-05 ENCOUNTER — APPOINTMENT (OUTPATIENT)
Dept: GENERAL RADIOLOGY | Facility: CLINIC | Age: 79
DRG: 963 | End: 2019-11-05
Attending: INTERNAL MEDICINE
Payer: COMMERCIAL

## 2019-11-05 ENCOUNTER — APPOINTMENT (OUTPATIENT)
Dept: SPEECH THERAPY | Facility: CLINIC | Age: 79
DRG: 963 | End: 2019-11-05
Attending: INTERNAL MEDICINE
Payer: COMMERCIAL

## 2019-11-05 LAB
ANION GAP SERPL CALCULATED.3IONS-SCNC: 7 MMOL/L (ref 3–14)
BUN SERPL-MCNC: 49 MG/DL (ref 7–30)
CALCIUM SERPL-MCNC: 8.1 MG/DL (ref 8.5–10.1)
CHLORIDE SERPL-SCNC: 115 MMOL/L (ref 94–109)
CO2 SERPL-SCNC: 24 MMOL/L (ref 20–32)
CREAT SERPL-MCNC: 2.3 MG/DL (ref 0.66–1.25)
ERYTHROCYTE [DISTWIDTH] IN BLOOD BY AUTOMATED COUNT: 17.5 % (ref 10–15)
GFR SERPL CREATININE-BSD FRML MDRD: 26 ML/MIN/{1.73_M2}
GLUCOSE BLDC GLUCOMTR-MCNC: 119 MG/DL (ref 70–99)
GLUCOSE BLDC GLUCOMTR-MCNC: 130 MG/DL (ref 70–99)
GLUCOSE BLDC GLUCOMTR-MCNC: 148 MG/DL (ref 70–99)
GLUCOSE BLDC GLUCOMTR-MCNC: 233 MG/DL (ref 70–99)
GLUCOSE BLDC GLUCOMTR-MCNC: 255 MG/DL (ref 70–99)
GLUCOSE BLDC GLUCOMTR-MCNC: 296 MG/DL (ref 70–99)
GLUCOSE BLDC GLUCOMTR-MCNC: 297 MG/DL (ref 70–99)
GLUCOSE BLDC GLUCOMTR-MCNC: 306 MG/DL (ref 70–99)
GLUCOSE SERPL-MCNC: 158 MG/DL (ref 70–99)
HCT VFR BLD AUTO: 30.9 % (ref 40–53)
HGB BLD-MCNC: 10 G/DL (ref 13.3–17.7)
INTERPRETATION ECG - MUSE: NORMAL
MCH RBC QN AUTO: 28.9 PG (ref 26.5–33)
MCHC RBC AUTO-ENTMCNC: 32.4 G/DL (ref 31.5–36.5)
MCV RBC AUTO: 89 FL (ref 78–100)
PLATELET # BLD AUTO: 288 10E9/L (ref 150–450)
POTASSIUM SERPL-SCNC: 2.9 MMOL/L (ref 3.4–5.3)
POTASSIUM SERPL-SCNC: 3.8 MMOL/L (ref 3.4–5.3)
RBC # BLD AUTO: 3.46 10E12/L (ref 4.4–5.9)
SODIUM SERPL-SCNC: 146 MMOL/L (ref 133–144)
WBC # BLD AUTO: 22.9 10E9/L (ref 4–11)

## 2019-11-05 PROCEDURE — 00000146 ZZHCL STATISTIC GLUCOSE BY METER IP

## 2019-11-05 PROCEDURE — 12000000 ZZH R&B MED SURG/OB

## 2019-11-05 PROCEDURE — 92610 EVALUATE SWALLOWING FUNCTION: CPT | Mod: GN

## 2019-11-05 PROCEDURE — 25000132 ZZH RX MED GY IP 250 OP 250 PS 637: Performed by: INTERNAL MEDICINE

## 2019-11-05 PROCEDURE — 84132 ASSAY OF SERUM POTASSIUM: CPT | Performed by: INTERNAL MEDICINE

## 2019-11-05 PROCEDURE — 25000128 H RX IP 250 OP 636: Performed by: INTERNAL MEDICINE

## 2019-11-05 PROCEDURE — 36415 COLL VENOUS BLD VENIPUNCTURE: CPT | Performed by: INTERNAL MEDICINE

## 2019-11-05 PROCEDURE — 25000131 ZZH RX MED GY IP 250 OP 636 PS 637: Performed by: INTERNAL MEDICINE

## 2019-11-05 PROCEDURE — 97116 GAIT TRAINING THERAPY: CPT | Mod: GP | Performed by: PHYSICAL THERAPIST

## 2019-11-05 PROCEDURE — 12000047 ZZH R&B IMCU

## 2019-11-05 PROCEDURE — 80048 BASIC METABOLIC PNL TOTAL CA: CPT | Performed by: INTERNAL MEDICINE

## 2019-11-05 PROCEDURE — 85027 COMPLETE CBC AUTOMATED: CPT | Performed by: INTERNAL MEDICINE

## 2019-11-05 PROCEDURE — 99232 SBSQ HOSP IP/OBS MODERATE 35: CPT | Performed by: INTERNAL MEDICINE

## 2019-11-05 PROCEDURE — 97110 THERAPEUTIC EXERCISES: CPT | Mod: GP | Performed by: PHYSICAL THERAPIST

## 2019-11-05 PROCEDURE — 71045 X-RAY EXAM CHEST 1 VIEW: CPT

## 2019-11-05 PROCEDURE — 25800030 ZZH RX IP 258 OP 636: Performed by: INTERNAL MEDICINE

## 2019-11-05 RX ORDER — PIPERACILLIN SODIUM, TAZOBACTAM SODIUM 3; .375 G/15ML; G/15ML
3.38 INJECTION, POWDER, LYOPHILIZED, FOR SOLUTION INTRAVENOUS EVERY 6 HOURS
Status: COMPLETED | OUTPATIENT
Start: 2019-11-05 | End: 2019-11-06

## 2019-11-05 RX ADMIN — SIMVASTATIN 10 MG: 10 TABLET, FILM COATED ORAL at 21:06

## 2019-11-05 RX ADMIN — INSULIN ASPART 2 UNITS: 100 INJECTION, SOLUTION INTRAVENOUS; SUBCUTANEOUS at 23:28

## 2019-11-05 RX ADMIN — INSULIN ASPART 4 UNITS: 100 INJECTION, SOLUTION INTRAVENOUS; SUBCUTANEOUS at 20:25

## 2019-11-05 RX ADMIN — PANTOPRAZOLE SODIUM 40 MG: 40 TABLET, DELAYED RELEASE ORAL at 08:08

## 2019-11-05 RX ADMIN — POTASSIUM CHLORIDE 40 MEQ: 1500 TABLET, EXTENDED RELEASE ORAL at 08:08

## 2019-11-05 RX ADMIN — HYDRALAZINE HYDROCHLORIDE 10 MG: 20 INJECTION INTRAMUSCULAR; INTRAVENOUS at 08:17

## 2019-11-05 RX ADMIN — INSULIN GLARGINE 20 UNITS: 100 INJECTION, SOLUTION SUBCUTANEOUS at 08:09

## 2019-11-05 RX ADMIN — INSULIN ASPART 1 UNITS: 100 INJECTION, SOLUTION INTRAVENOUS; SUBCUTANEOUS at 08:18

## 2019-11-05 RX ADMIN — PIPERACILLIN AND TAZOBACTAM 3.38 G: 3; .375 INJECTION, POWDER, LYOPHILIZED, FOR SOLUTION INTRAVENOUS at 13:27

## 2019-11-05 RX ADMIN — INSULIN ASPART 3 UNITS: 100 INJECTION, SOLUTION INTRAVENOUS; SUBCUTANEOUS at 12:19

## 2019-11-05 RX ADMIN — SENNOSIDES AND DOCUSATE SODIUM 2 TABLET: 8.6; 5 TABLET ORAL at 10:32

## 2019-11-05 RX ADMIN — PIPERACILLIN AND TAZOBACTAM 3.38 G: 3; .375 INJECTION, POWDER, LYOPHILIZED, FOR SOLUTION INTRAVENOUS at 20:22

## 2019-11-05 RX ADMIN — OXYCODONE HYDROCHLORIDE 5 MG: 5 TABLET ORAL at 23:23

## 2019-11-05 RX ADMIN — PANTOPRAZOLE SODIUM 40 MG: 40 TABLET, DELAYED RELEASE ORAL at 17:02

## 2019-11-05 RX ADMIN — OXYCODONE HYDROCHLORIDE 5 MG: 5 TABLET ORAL at 18:38

## 2019-11-05 RX ADMIN — POTASSIUM CHLORIDE 40 MEQ: 1500 TABLET, EXTENDED RELEASE ORAL at 10:15

## 2019-11-05 RX ADMIN — POLYETHYLENE GLYCOL 3350 17 G: 17 POWDER, FOR SOLUTION ORAL at 10:32

## 2019-11-05 RX ADMIN — INSULIN GLARGINE 20 UNITS: 100 INJECTION, SOLUTION SUBCUTANEOUS at 21:06

## 2019-11-05 RX ADMIN — SODIUM CHLORIDE: 9 INJECTION, SOLUTION INTRAVENOUS at 04:15

## 2019-11-05 RX ADMIN — VERAPAMIL HYDROCHLORIDE 120 MG: 120 TABLET, FILM COATED, EXTENDED RELEASE ORAL at 21:06

## 2019-11-05 RX ADMIN — PIPERACILLIN SODIUM,TAZOBACTAM SODIUM 2.25 G: 2; .25 INJECTION, POWDER, FOR SOLUTION INTRAVENOUS at 04:12

## 2019-11-05 RX ADMIN — PIPERACILLIN SODIUM,TAZOBACTAM SODIUM 2.25 G: 2; .25 INJECTION, POWDER, FOR SOLUTION INTRAVENOUS at 08:08

## 2019-11-05 RX ADMIN — INSULIN ASPART 4 UNITS: 100 INJECTION, SOLUTION INTRAVENOUS; SUBCUTANEOUS at 17:02

## 2019-11-05 ASSESSMENT — ACTIVITIES OF DAILY LIVING (ADL)
ADLS_ACUITY_SCORE: 15
ADLS_ACUITY_SCORE: 15
ADLS_ACUITY_SCORE: 16
ADLS_ACUITY_SCORE: 16
ADLS_ACUITY_SCORE: 15
ADLS_ACUITY_SCORE: 16

## 2019-11-05 NOTE — PROGRESS NOTES
During AM rounds, pt was disoriented x4. Pt was redirected and reassessed a few minutes later; reported correct answers to orientation questions.    Pt had also removed IV and tele, as well as gowns. Pt questioned about this and denied doing it.

## 2019-11-05 NOTE — PLAN OF CARE
Neuro: Disoriented to tinme at beginning of shift, but reassessed later and was Aox4. Pt remained anxious during assessments. Was found multiple times c/ gown and in a disheveled state; stating that he was not sure why his gown was off or why he was tangled in wiring.     Respiratory: Diminished bilat LS.     Cardiac: Elevated BP; PRN hydralzine administered. Edema +2 BLE; +3 scrotal noted.     GI/: Bowel sounds normoreactive and audible x4 quads. BM 10/31. Adequate urine OP; dark izabel tinged. Senna administered.    Skin: L lateral to lower back ecchymosis; purple in hue. Dressing changed at site of former CT. Incision WNL; suture in place. Serosanginous noted on gauze.     Pain: C/O of L sided pain. Refer to MAR and FS.     Mobility: Ax1 c/ GB and walker.     Diet: DM.    IVF: NS.    Plan of Care: Continue pain control. Transition to TCU setting.      Will continue to monitor.

## 2019-11-05 NOTE — PLAN OF CARE
"Discharge Planner PT   Patient plan for discharge: Didn't state.   Current status: Patient needed encouragement to amb as stating he is \"too weak\" to amb. Tolerated amb 70 feet with ww with CGA to min assist. Unsteady at times and looks down unless cued for posture. Visibly SOB with amb. O2 sat 98% after amb. Left up in chair with chair alarm on and needs close.   Barriers to return to prior living situation: Fall risk, fall history, impaired balance, stairs at home, limited activity tolerance  Recommendations for discharge: TCU  Rationale for recommendations: Pt currently below baseline mobility and requires assist with all mobility. Patient is currently a fall risk. Pt will benefit from continued therapy at TCU to address impairments and increase mobility and functional independence prior to returning home.        Entered by: Nadia Mejia 11/05/2019 9:58 AM      "

## 2019-11-05 NOTE — PROGRESS NOTES
[facility]    Medicine Progress Note - Hospitalist Service       Date of Admission:  10/30/2019  Assessment & Plan   Tc Garcia is a 79 year old male with a history of atrial fibrillation, DM type II, HTN who presented to the ED on 10/30/2019 after fall down the stairs sustaining a C3 spinous process fracture, fracture of T2, multiple left sided rib fractures and moderate left hemothorax      Mechanical fall  Multiple left sided rib fractures   Moderate left hemothorax   C3 spinous process fracture  Fracture of T2  Anemia  Sustained after a fall down the stairs. No dizziness or LOC. No chest pain or palpitations. Numerous fractures (L rib fractures C3 spinous process fx, T2 acute fracure) seen on CT scans of the cervical spine and chest.  Of note CT scan of the head did not show any evidence of bleeding.  In the ED a chest tube was placed as well   - Trauma service ok'd ambulation with assistance  - Ortho spine notes cervical and thoracic fracture stable and ok to mobilize as tolerated with restrictions based on other injuries, no bracing necessary  - Self pulled chest tube overnight 11/2-3, stable without 11/4  - Transfuse Hgb < 7. Transfusion consent is in chart  - Hgb stable     Delirium. Resolved   Intermittent, pulled IV's, chest tube out overnight. 11/3 morning appropriate but still with mild confusion. Neuro nonfocal.      Alcohol use/ abuse disorder  Has longstanding h/o heavy Etoh use (per wife).  No signs of withdrawal. ? Contributing to fall but wife doesn't think that he was drinking at the time of the fall.   - No signs of withdrawal     Acute renal failure. Improving   Creatinine on admission 10/30 at 1.09. [place] to 2.37 on 10/31. UOP  Sluggish, 90 ml 10/31. PTA on losartan-hydrochlorothiazide, lisinopril (ACE I and ARB). No noted hypotensive episodes initially but some hypotension to 80-90's systolic during stay as well as bradycardia. ? If relative hypotension with fall and ACEI/  ARB on board. CK sl elevated but not enough to explain. Likely ATN.  - UA remarkable for hematuria (RBC>182), dipstick protein 30, 2 WBC  - Renal US with small R renal cyst, no obstruction  - Avoid nephrotoxins  - Stopped IVF as patient starting to exhibit fluid over load  - Holding ACE I and ARB, would only take either ACE I or ARB in future  - Nephrology following and appreciate their assistance     Bradycardia  Atrial fibrillation   Elevated BNP  HTN/HLP   Normally anticoagulated on Eliquis.  EKG in the ED showed sinus bradycardia. 11/2-11/3 harrison to 30-40's  - Holding Eliquis  - Discontinued clonidine, propranolol, verapamil with bradycardia and mild hypotension  - Continue telemetry  - Concern that given the renal failure that drugs may have not been cleared causing bradycardia     Leukocytosis  Admit WBC at 25.6->26.9->25.9->28.2--->22.9.  Mild lactic acidosis on admission.  Procalcitonin quite elevated at 4.69  - Blood cultures with NGTD   - CXR 11/4 relatively unremarkable, ? Small infiltrate L base  - Repeat UA 11/2 without evidence of infection  - Started on empiric zosyn 10/31 and will complete a 7 day course tomorrow   - MRSA swabs negative  - Lactate normalized 1.    DM type II   PTA on Metformin 500 mg BID, Actos 30 mg and insulin pump.  hgb A1C at 7.7% on admission 10/31. PTA insulin dosing ~50-60 Units daily through pump.   - Holding PTA meds and insulin pump   - Lantus to 20 units BID 11/3-> improved blood sugars  - Moderate SSI   - Mod CHO diet     Anemia  Presumed 2/2 bleeding, following closely as above      Diet: Combination Diet 6681-4276 Calories: Moderate Consistent CHO (4-6 CHO units/meal)    DVT Prophylaxis: Pneumatic Compression Devices  Pruett Catheter: not present  Code Status: Full Code      Disposition Plan   Expected discharge: 1-2 days, recommended to transitional care unit once cleared by surgical services and nephrology.  Entered: Lam Triplett DO 11/05/2019, 11:08 AM       The  patient's care was discussed with the Bedside Nurse and Patient.    Lam Triplett, DO  Hospitalist Service  [facility]    ______________________________________________________________________    Interval History   Patient seen and examined.  No acute events over night.  No fevers or chills.  Pain is well controlled.  No chest pain or SOB.      Data reviewed today: I reviewed all medications, new labs and imaging results over the last 24 hours. I personally reviewed no images or EKG's today.    Physical Exam   Vital Signs: Temp: 97.6  F (36.4  C) Temp src: Oral BP: 132/82 Pulse: 86 Heart Rate: 100 Resp: 18 SpO2: 97 % O2 Device: None (Room air) Oxygen Delivery: 3 LPM  Weight: 210 lbs 1.57 oz  General Appearance: Resting comfortably.  NAD   Respiratory: Clear to auscultation.  No respiratory distress on RA   Cardiovascular: RRR.  No murmurs  GI: Bowel sounds present.  Non-tender  Skin: No rashes.  No cyanosis  Other: 1-2+ lower extremity edema. No calf tenderness      Data   Recent Labs   Lab 11/05/19  0721 11/04/19  0640 11/03/19  0558 11/02/19  1133 11/02/19  1010  10/31/19  0744 10/30/19  1729   WBC 22.9* 24.7* 22.3*  --   --    < > 26.9* 25.6*   HGB 10.0* 9.6* 8.0*  --   --    < > 10.4* 12.2*   MCV 89 89 89  --   --    < > 90 90    263 201  --   --    < > 213 262   INR  --   --   --   --   --   --  1.37* 1.40*   * 147* 140  --   --    < > 139 140   POTASSIUM 2.9* 3.5 4.3  --   --    < > 4.3 3.2*   CHLORIDE 115* 116* 108  --   --    < > 102 104   CO2 24 23 24  --   --    < > 28 31   BUN 49* 66* 64*  --   --    < > 25 15   CR 2.30* 3.14* 3.51*  --   --    < > 2.37* 1.09   ANIONGAP 7 8 8  --   --    < > 9 5   LLOYD 8.1* 7.6* 7.7*  --   --    < > 8.7 8.8   * 149* 256*  --   --    < > 199* 117*   ALBUMIN  --   --   --   --  2.8*  --   --  3.6   PROTTOTAL  --   --   --   --  5.2*  --   --  6.3*   BILITOTAL  --   --   --   --  1.3  --   --  0.6   ALKPHOS  --   --   --   --  58  --    --  73   ALT  --   --   --   --  33  --   --  30   AST  --   --   --   --  49*  --   --  44   TROPI  --   --   --  0.057*  --   --   --   --     < > = values in this interval not displayed.     Recent Results (from the past 24 hour(s))   XR Chest Port 1 View    Narrative    CHEST PORTABLE ONE VIEW November 5, 2019 6:04 AM     HISTORY: Left hemothorax, multiple left rib fracture.    COMPARISON: Chest x-ray 11/4/2019.      Impression    IMPRESSION: Portable view of the chest is performed. Left rib  fractures are again noted. Right lung is clear. No evidence of right  pleural effusion. Retrocardiac opacity appears stable. Small left  pleural effusion may have increased since prior study due to obscuring  of the left hemidiaphragm. Heart remains enlarged. Aorta demonstrates  calcified plaque. No evidence of pneumothorax.    PAZ TERRY MD

## 2019-11-05 NOTE — PLAN OF CARE
VSS ex HTN, hydralazine prn given once. A/O. SBA, walker. SOB with exertion. LL lung fine crackles, +3 edema on LE & hip area, not diuresing yet. Large purple/maroon bruise on lower back, hips and L thigh area. Denies pain. Complains of hard time swallowing solid food, Speech consulted, diet changed to DD2. L lateral chest old CT site dressing changed, small blisters around that area. +bs/gas but no BM since 10/29 per pt. PRN senna/miralax given. Likes to drink apple juice, had few cups, blood sugar elevated in 200s. Skins tears on hand, dressing changed. Tele Afib. K+ replaced, recheck @ 1500  Pending TCU placement.

## 2019-11-05 NOTE — PROGRESS NOTES
Spiritual Health    SH visited Pt per length of stay. Pt requested visit from .     SH filed request for pastoral visit from .     SH will remain available as needed.     Maris Medina  Chaplain Resident

## 2019-11-05 NOTE — PROGRESS NOTES
Clinical Swallow Evaluation      11/05/19 1341   General Information   Onset Date 10/30/19   Start of Care Date 11/05/19   Comments Per MD, Tc MACDONALD Jose is a 79 year old male with a history of atrial fibrillation, DM type II, HTN who presented to the ED on 10/30/2019 after fall down the stairs sustaining a C3 spinous process fracture, fracture of T2, multiple left sided rib fractures and moderate left hemothorax Clinical swallow evaluation orders received as pt reports difficulty swallowing solids. He describes it as sticking at mid-pharyngeal level, sometimes only seems to go down the left side of his throat and also reports frequent belching. He reports difficulty with solids only, specifically with dryer/harder items such as pineapple and dryer [place] toast. This is all new, no dysphagia at baseline.    Clinical Swallow Evaluation   Oral Musculature generally intact   Structural Abnormalities none present   Dentition present and adequate   Mucosal Quality good   Mandibular Strength and Mobility intact   Oral Labial Strength and Mobility WFL   Lingual Strength and Mobility WFL   Velar Elevation intact   Buccal Strength and Mobility intact   Laryngeal Function Throat clear;Swallow;Voicing initiated;Dry swallow palpated   Additional Documentation Yes   Clinical Swallow Eval: Thin Liquid Texture Trial   Mode of Presentation, Thin Liquids cup;straw;self-fed   Volume of Liquid or Food Presented 4 oz   Oral Phase of Swallow Premature pharyngeal entry   Pharyngeal Phase of Swallow reduction in laryngeal movement   Diagnostic Statement no s/s noted   Clinical Swallow Eval: Puree Solid Texture Trial   Mode of Presentation, Puree spoon;self-fed   Volume of Puree Presented 3 oz   Oral Phase, Puree WFL   Pharyngeal Phase, Puree reduction in laryngeal movement   Diagnostic Statement no s/s noted   Clinical Swallow Eval: Semisolid Texture Trial   Mode of Presentation, Semisolid spoon;self-fed   Volume of Semisolid Food  Presented 2 oz   Oral Phase, Semisolid WFL   Pharyngeal Phase, Semisolid reduction in laryngeal movement   Diagnostic Statement no s/s noted   Clinical Swallow Eval: Solid Food Texture Trial   Mode of Presentation, Solid self-fed   Volume of Solid Food Presented marlyn crackers   Oral Phase, Solid WFL   Pharyngeal Phase, Solid impaired;feeling of something stuck in throat;reduction in laryngeal movement;repeated swallows;throat clearing   Diagnostic Statement  throat clearing/globus sensation observed (?pharyngeal retention).    General Therapy Interventions   Planned Therapy Interventions Dysphagia Treatment   Dysphagia treatment Modified diet education;Instruction of safe swallow strategies   Swallow Eval: Clinical Impressions   Skilled Criteria for Therapy Intervention Skilled criteria met.  Treatment indicated.   Functional Assessment Scale (FAS) 5   Treatment Diagnosis dysphagia   Diet texture recommendations Dysphagia diet level 2;Thin liquids   Recommended Feeding/Eating Techniques alternate between small bites and sips of food/liquid;hard swallow w/ each bite or sip;maintain upright posture during/after eating for 30 mins;small sips/bites   Therapy Frequency 5x/week   Predicted Duration of Therapy Intervention (days/wks) 1 week   Anticipated Discharge Disposition   (TCU)   Risks and Benefits of Treatment have been explained. Yes   Patient, family and/or staff in agreement with Plan of Care Yes   Clinical Impression Comments Assessment completed with thin liquids, puree solids, semi-solids, general solids. Pt currently presents with mild dysphagia which is likely 2/2 C3 spinous process fracture. Oral phase is functional. Pharyngeal phase notable for suspect delayed swallow, reduced hyolaryngeal ROM to palpation and suspect generalized pharyngeal weakness. No s/s aspiration or dysphagia with thin liquids, puree solids or semi-solids, however with general solids throat clearing/globus sensation observed  (?pharyngeal retention).    Total Evaluation Time   Total Evaluation Time (Minutes) 28

## 2019-11-05 NOTE — PROGRESS NOTES
Antimicrobial Stewardship Team Note    Antimicrobial Stewardship Program - A joint venture between [facility] and [facility] to optimize antibiotic management.     Patient: Tc Garcia  MRN: [id number]  Allergies: No known drug allergies    Brief Summary:   79 year old male with a history of atrial fibrillation, DM type II, HTN who presented to the ED on 10/30/2019 after fall down the stairs sustaining multiple left sided rib fractures and moderate left hemothorax   - concern for sepsis due to lactate of 2.7, leukocytosis - WBC 26.9, procalcitonin of 4.69  - started on empiric zosyn 10/31    Leukocytosis  Admit WBC at 25.6->26.9->25.9->28.2 11/2. Mild lactic acidosis on admission  - blood cultures NGTD 11/3  - MRSA swabs negative  - CXR 11/2 possible infiltrate in L lung (may be contusion as well)  - repeat UA 11/2 without evidence of infection  - abdomen appears benign, LFT's fine  - WBC 28.2 on 11/2 and improved on 11/3 to 22.3, now 24.7 11/04  - No fevers since admission.  - Lactate 2.7 pm 10/31. Up to 3.6 on 11/02, 2.6 today 11/4       Active Anti-infective Medications   (From admission, onward)                Start     Stop    11/03/19 1500  piperacillin-tazobactam  2.25 g,   Intravenous,   EVERY 6 HOURS     Sepsis        --          Assessment: Due to patient's clinical stability, would recommend a shorter duration of empiric antibiotic therapy.    Recommendations: Continue piperacillin-tazobactam for a duration of 7 days.     Discussed with ID Staff and Dr. Nikolai Keller, PharmD    Vital Signs/Clinical Features:  Vitals         11/03 0700  -  11/04 0659 11/04 0700  -  11/05 0659 11/05 0700  -  11/05 0940   Most Recent    Temp ( F) 97.5 -  98.2    97.7 -  98.7      97.6     97.6 (36.4)    Pulse 52 -  80    74 -  86       86    Heart Rate 53 -  80    79 -  85    80 -  100     100    Resp 7 -  26    11 - 23      18     18    /73 -  193/105    127/77 -  188/100     132/82 -  182/103     132/82    SpO2 (%) 63 -  100    90 -  100      97     97            Labs  Estimated Creatinine Clearance: 29.7 mL/min (A) (based on SCr of 2.3 mg/dL (H)).  Recent Labs   Lab Test 10/31/19  0744 11/01/19  0631 11/02/19  0713 11/03/19  0558 11/04/19  0640 11/05/19  0721   CR 2.37* 2.99* 2.72* 3.51* 3.14* 2.30*       Recent Labs   Lab Test 10/30/19  1729 10/31/19  0744 11/01/19  0631 11/01/19  1806 11/02/19  0713 11/03/19  0558 11/04/19  0640 11/05/19  0721   WBC 25.6* 26.9* 25.9*  --  28.2* 22.3* 24.7* 22.9*   ANEU 21.7*  --   --   --   --  19.0*  --   --    ALYM 2.0  --   --   --   --  0.9  --   --    CHRIS 1.3  --   --   --   --  2.1*  --   --    AEOS 0.3  --   --   --   --  0.0  --   --    HGB 12.2* 10.4* 8.6* 8.5* 8.2* 8.0* 9.6* 10.0*   HCT 38.3* 32.1* 26.6*  --  25.6* 24.1* 28.9* 30.9*   MCV 90 90 89  --  89 89 89 89    213 224  --  257 201 263 288       Recent Labs   Lab Test 10/30/19  1729 11/02/19  1010   BILITOTAL 0.6 1.3   ALKPHOS 73 58   ALBUMIN 3.6 2.8*   AST 44 49*   ALT 30 33       Recent Labs   Lab Test 10/31/19  0744 10/31/19  2142 11/02/19  1010 11/02/19  1133 11/02/19 2122 11/03/19  0843 11/04/19  0640   PCAL 4.69  --   --   --   --   --   --    LACT  --  2.5* 4.1* Canceled, Test credited 3.6* 2.3* 1.7             Culture Results:  7-Day Micro Results       Procedure Component Value Units Date/Time    Blood culture [[id number]] Collected:  10/31/19 2144    Order Status:  Completed Lab Status:  Preliminary result Updated:  11/05/19 0354    Specimen:  Blood      Specimen Description Blood Left Arm     Special Requests Aerobic and anaerobic bottles received     Culture Micro No growth after 5 days    Blood culture [[id number]] Collected:  10/31/19 2141    Order Status:  Completed Lab Status:  Preliminary result Updated:  11/05/19 0354    Specimen:  Blood      Specimen Description Blood Right Arm     Special Requests Aerobic and anaerobic bottles received     Culture Micro No  growth after 5 days    Methicillin Resist/Sens S. aureus PCR [[id number]] Collected:  10/31/19 1300    Order Status:  Completed Lab Status:  Final result Updated:  10/31/19 946    Specimen:  Nares      Specimen Description Nares     Methicillin Resist/Sens S. aureus PCR Negative     Comment: MRSA Negative: SA Negative  MRSA and Staphylococcus aureus target DNA not   detected, presumed negative for MRSA and SA colonization or the number of   bacteria present may be below the limit of detection for the assay. FDA   approved assay performed using [employer] GeneXpert(R) real-time PCR.                 Recent Labs   Lab Test 10/31/19  1900 19  1400   URINEPH 5.0 5.5   NITRITE Negative Negative   LEUKEST Trace* Trace*   WBCU 2 1                         Imaging: Xr Chest 2 Views    Result Date: 2019  CHEST TWO VIEWS 2019 6:17 AM HISTORY: Hemothorax. COMPARISON: 2019.     IMPRESSION: No change in left-sided rib fractures. Question trace residual atelectasis and/or infiltrate at the left base, lungs otherwise clear. There are no acute infiltrates. The cardiac silhouette is not enlarged. Pulmonary vasculature is unremarkable. ALLYSON MARTINEZ MD    Echocardiogram Complete    Result Date: 2019  [id number]^[email or web address]^NAZANIN^PEYTON    [facility] Echocardiography Laboratory [address] [place] [address]   Name: CEDRICK PHILLIPS MRN: [id number] : 1940 Study Date: 2019 04:10 PM Age: 79 yrs Gender: Male Patient Location: [facility] Reason For Study: Bradycardia - Sinus Ordering Physician: NAZANIN MIX Referring Physician: NGHIA PATEL Performed By: Jimi Lee RDCS  BSA: 2.1 m2 Height: 70 in Weight: 205 lb HR: 38 BP: 129/63 mmHg _____________________________________________________________________________ __   Procedure Complete Portable Echo Adult. Optison (NDC #7612-6572) given intravenously. (Suboptimal images, abbreviated study  performed). _____________________________________________________________________________ __   Interpretation Summary  The rhythm was undetermined. It is likely junctional at 38 bpm. Left ventricular systolic function is normal. The visual ejection fraction is estimated at 55-60%. There is moderate concentric left ventricular hypertrophy. The left atrium is moderate to severely dilated. The right atrium is mild to moderately dilated. The right ventricle is mild to moderately dilated. The right ventricular systolic function is mildly reduced. There is mild to moderate (1-2+) tricuspid regurgitation. The right ventricular systolic pressure is approximated at 27mmHg plus the right atrial pressure. IVC diameter >2.1 cm collapsing <50% with sniff suggests a high RA pressure estimated at 15 mmHg or greater. Large left pleural effusion noted. There is no comparison study available. The study was technically limited. Contrast was used without apparent complications. _____________________________________________________________________________ __   Left Ventricle The left ventricle is normal in size. There is moderate concentric left ventricular hypertrophy. Left ventricular systolic function is normal. The visual ejection fraction is estimated at 55-60%. Diastolic function not assessed due to arrhythmia. No regional wall motion abnormalities noted.  Right Ventricle The right ventricle is mild to moderately dilated. The right ventricle is not well visualized. The right ventricular systolic function is mildly reduced.  Atria The left atrium is moderate to severely dilated. The right atrium is mild to moderately dilated. There is no atrial shunt seen.  Mitral Valve The mitral valve is not well visualized. There is trace to mild mitral regurgitation.   Tricuspid Valve There is mild to moderate (1-2+) tricuspid regurgitation. The right ventricular systolic pressure is approximated at 27mmHg plus the right atrial pressure. IVC  diameter >2.1 cm collapsing <50% with sniff suggests a high RA pressure estimated at 15 mmHg or greater.  Aortic Valve There is moderate trileaflet aortic sclerosis. There is trace aortic regurgitation. No PW/CW - valve opening does not suggest moderate or severe AS.  Pulmonic Valve There is no pulmonic valvular stenosis.  Vessels Normal size aorta.  Pericardium The pericardium appears normal. Large left pleural effusion.   Rhythm The rhythm was undetermined. It is likely junctional at 38 bpm. _____________________________________________________________________________ __ MMode/2D Measurements & Calculations IVSd: 1.4 cm  LVIDd: 4.4 cm LVIDs: 3.1 cm LVPWd: 1.4 cm FS: 29.7 % LV mass(C)d: 242.0 grams LV mass(C)dI: 114.7 grams/m2 Ao root diam: 3.6 cm LA dimension: 5.0 cm asc Aorta Diam: 3.3 cm LA/Ao: 1.4 RWT: 0.62   Doppler Measurements & Calculations PA acc time: 0.08 sec TR max twin: 262.0 cm/sec TR max P.5 mmHg     _____________________________________________________________________________ __   Report approved by: Momo Stafford 2019 10:44 PM      Us Renal Complete    Result Date: 2019  US RENAL COMPLETE  10/31/2019 11:04 PM HISTORY: Acute kidney injury. COMPARISON: None. FINDINGS: The kidneys are normal in size, shape and position. The right kidney measures 10.1 x 5.0 x 5.0 cm with a cortical thickness of 1.3 cm. The left kidney measures 10.5 x 5.4 x 4.8 cm with a cortical thickness of 1.9 cm. There is a 1.3 cm cyst at the lower pole of the right kidney. No solid renal mass, stone, or collecting system dilatation. The urinary bladder is distended and appears normal.     IMPRESSION: Small right renal cyst. Otherwise unremarkable renal ultrasound. No hydronephrosis. ELMER SAENZ MD    Xr Chest Port 1 View    Result Date: 2019  CHEST PORTABLE ONE VIEW 2019 6:04 AM HISTORY: Left hemothorax, multiple left rib fracture. COMPARISON: Chest x-ray 2019.     IMPRESSION: Portable  view of the chest is performed. Left rib fractures are again noted. Right lung is clear. No evidence of right pleural effusion. Retrocardiac opacity appears stable. Small left pleural effusion may have increased since prior study due to obscuring of the left hemidiaphragm. Heart remains enlarged. Aorta demonstrates calcified plaque. No evidence of pneumothorax. PAZ TERRY MD    Xr Chest Port 1 View    Result Date: 11/3/2019  XR CHEST PORTABLE 1 VIEW   11/3/2019 12:05 AM HISTORY: Chest tube out. COMPARISON: 11/2/2019. FINDINGS: Upright portable chest. The left chest tube has been removed. There is no pneumothorax. The heart is enlarged. No pulmonary edema. The thoracic aorta is calcified. There is mild right basilar probable atelectasis. The lungs are otherwise clear. Multiple posterior left rib fractures.     IMPRESSION: No pneumothorax post chest tube removal. ELMER SAENZ MD    Xr Chest Port 1 View    Result Date: 11/2/2019  CHEST ONE VIEW PORTABLE   11/2/2019 5:42 AM HISTORY: left hemothorax, multiple left rib fx COMPARISON: 11/1/2019 chest x-ray 0625 hours     IMPRESSION: No significant interval change. Redemonstrated left chest tube and multiple left rib fractures, no pneumothorax. Mild retrocardiac opacity lower left lung consistent with atelectasis or infiltrate or possibly contusion. Right lung clear. Normal caliber pulmonary vessels. JENNIFER BROWN MD    Xr Chest Port 1 View    Result Date: 11/1/2019  CHEST ONE VIEW UPRIGHT 11/1/2019 6:25 AM HISTORY: Left hemothorax, multiple left rib fracture. COMPARISON: October 31, 2019     IMPRESSION: Chest tube noted on the left, no pneumothorax. Multiple left-sided rib fractures again evident. ALLYSON MARTINEZ MD    Xr Chest Port 1 View    Result Date: 10/31/2019  CHEST PORTABLE ONE VIEW   10/31/2019 9:55 AM HISTORY: Left hemothorax, multiple left rib fractures. COMPARISON: 10/30/2019.     IMPRESSION: Left chest tube is stable. Tiny left apical pneumothorax.  Multiple left rib fractures. Patchy opacities at the left mid to inferior lung appears stable and could be some atelectasis. Right lung is clear. Stable enlarged cardiac silhouette. ROSA PAGE MD    Xr Chest Port 1 View    Result Date: 10/30/2019  CHEST ONE VIEW SUPINE 10/30/2019 7:06 PM HISTORY: Chest tube placement COMPARISON: None.     IMPRESSION: Chest tube placed on the left with tip near the apex. Multiple rib fractures noted on the left. Right lung clear. ALLYSON MARTINEZ MD    Ct Cervical Spine W/o Contrast    Result Date: 10/30/2019  CT CERVICAL SPINE WITHOUT CONTRAST   10/30/2019 5:20 PM HISTORY: Trauma  TECHNIQUE: Axial images of the cervical spine were obtained without intravenous contrast. Multiplanar reformations were performed. Radiation dose for this scan was reduced using automated exposure control, adjustment of the mA and/or kV according to patient size, or iterative reconstruction technique. COMPARISON: None. FINDINGS: There is an acute fracture that involves the spinous process of C3 (series 6 image 38-40, series 5 image 37). There is a subtle cortical irregularity involving the right aspect of the C7 vertebral body which is indeterminate for a nondisplaced fracture. There are acute fractures involving right anterolateral aspect of the T2 inferior endplate extending into the intervertebral disc space, possibly also involving the superior endplate of T2 but incompletely evaluated. Multiple left-sided upper rib fractures. Contusion of the left upper pulmonary lobe with associated pleural fluid/hemothorax. Small volume scattered air noted within the anterior and posterior soft tissues of the upper chest/back. There is a background of multilevel degenerative disc disease and facet arthropathy of the cervical spine. Multilevel uncovertebral arthropathy is also noted. No high-grade spinal stenosis is identified. Varying degrees of multilevel neural foraminal narrowing is seen, most pronounced on the  left at C4-C5. Bilateral carotid bifurcation atherosclerotic calcifications are noted.     IMPRESSION: 1. Acute C3 spinous process fracture. 2. Acute fractures involving the right anterolateral aspect of the T2 vertebral body extending into the disc space. Recommend dedicated thoracic spine CT. 3. Subtle cortical irregularity involving the right aspect of the C7 vertebral body, indeterminate for subtle acute fracture. 4. Multiple acute left-sided upper rib fractures with left-sided pulmonary contusion and hemothorax. [Critical Result: Acute cervical spine fracture] Finding was identified on 10/30/2019 5:38 PM. Dr. Sebastian was contacted by me on 10/30/2019 5:52 PM and verbalized understanding of the critical result. ALLYSON HOU MD    Chest Ct W/o Contrast    Result Date: 10/30/2019  CT CHEST WITHOUT CONTRAST 10/30/2019 5:21 PM HISTORY: Trauma, left chest/shoulder pain. COMPARISON: None. TECHNIQUE: Volumetric helical acquisition of CT images of the chest from the clavicles to the kidneys were acquired without IV contrast. Radiation dose for this scan was reduced using automated exposure control, adjustment of the mA and/or kV according to patient size, or iterative reconstruction technique. FINDINGS:  Posterolateral fourth, fifth, sixth, seventh, eighth, and ninth rib fractures noted on the left. Rib 7 is displaced, and overlapping. Ribs 8 and 9 are displaced as well as 5, 4, and 6. There is a moderate hemothorax. Trace probable pneumothorax inferiorly. Pulmonary contusion and/or atelectasis noted on the left. Minimal right pleural effusion and associated atelectasis. Medial rib fractures of 8th and 9th ribs as well as a posterior fracture of the sixth rib. There are extensive coronary vascular calcifications and/or stents consistent with coronary artery disease. There are mild atherosclerotic changes of the visualized aorta and its branches. There is no evidence of aortic aneurysm. No acute findings in the  visualized upper abdomen.     IMPRESSION: Multiple left-sided rib fractures, ribs 6, 8, and 9 are fractured in two places. Moderate left hemothorax and trace pleural air on the left. ALLYSON MARTINEZ MD    Ct Head W/o Contrast    Result Date: 10/30/2019  CT SCAN OF THE HEAD WITHOUT CONTRAST   10/30/2019 5:21 PM HISTORY: Trauma TECHNIQUE: Axial images of the head and coronal reformations without IV contrast material. Radiation dose for this scan was reduced using automated exposure control, adjustment of the mA and/or kV according to patient size, or iterative reconstruction technique. COMPARISON: None. FINDINGS: There is no evidence of intracranial hemorrhage, mass, acute infarct or other acute abnormality. Generalized atrophy of the brain. There is low attenuation in the white matter of the cerebral hemispheres consistent with sequelae of small vessel ischemic disease. Ventricular size is within normal limits without evidence of hydrocephalus. There is a round low-attenuation lesion measuring approximately 4 mm (series 2 image 17) interposed between the anterior aspect of the midbrain and the superior aspect of the basilar artery, essentially in the upper aspect of the interpeduncular cistern. This lesion appears to demonstrate fat attenuation internally, and may represent a small lipoma. No associated mass effect. Bilateral lens replacements. Polypoid mucosal thickening in the alveolar recess of the left maxillary sinus. Mastoid and middle ear cavities appear clear. Advanced bilateral temporomandibular joint osteoarthritis. The bony calvarium and bones of the skull base appear intact. On the  image, there appears to be asymmetric opacification projecting over the left lung apex, incompletely evaluated.     IMPRESSION: 1. No CT findings of acute traumatic intracranial abnormality. 2. Generalized brain parenchymal volume loss. Scattered hypoattenuation in the cerebral white matter, likely related to small vessel  ischemic disease. 3. Other chronic-appearing intracranial findings, as detailed in the body of the report. 4. On the  image, note is made of asymmetric opacification of the left lung apex, incompletely evaluated. Recommend dedicated chest radiographs for further characterization. ALLYSON HOU MD    Ct Thoracic Spine W Contrast    Result Date: 10/30/2019  CT THORACIC SPINE WITHOUT CONTRAST   10/30/2019 6:32 PM HISTORY: Thoracic spine fracture, traumatic. Trauma, multiple rib fractures, CT cervical spine shows C3 spinous process fracture and T2-T3 fracture into spine.  TECHNIQUE: Axial images of the thoracic spine were obtained without intravenous contrast. Multiplanar reformations were performed. Radiation dose for this scan was reduced using automated exposure control, adjustment of the mA and/or kV according to patient size, or iterative reconstruction technique. COMPARISON: Cervical spine CT same date. FINDINGS: Subtle nondisplaced acute fracture involving the inferior endplate of T2 (series 15 image 16). The fracture line extends into the region of the T2-T3 intervertebral disc space. Slight undulation of the superior endplates of T3 and T4, age-indeterminate. The remaining vertebral body heights appear maintained. There are acute nondisplaced fractures involving the left T5-T9 transverse processes. Alignment of the thoracic spine is within normal limits. Bridging ossification along the right anterolateral aspect of the upper to mid thoracic spine, most pronounced from T3 through T11, consistent with diffuse idiopathic skeletal hyperostosis. There is a background of mild multilevel degenerative disc disease. No high-grade spinal stenosis is seen. Multiple acute left-sided rib fractures are also noted. Partially visualized left pulmonary contusions with left hemothorax and trace left pleural air, better characterized on chest CT of same date. Please see chest CT of same day for details regarding extraspinal  findings. Small right pleural effusion also noted with basilar atelectasis. Coronary artery and aortic atherosclerotic calcifications are noted.     IMPRESSION: 1. Subtle nondisplaced acute fracture involving the T2 inferior endplate extending into the T2-T3 disc space. 2. Age-indeterminate mild contour deformities involving the T2 and T3 superior endplates. 3. Acute left T5-T9 transverse process fractures. 4. Multiple acute left-sided rib fractures, left pulmonary contusion, and left hemopneumothorax, better characterized on chest CT of same date. ALLYSON HOU MD

## 2019-11-05 NOTE — CONSULTS
Care Transition Initial Assessment - SW     Met with: Patient    Active Problems:    Hemothorax on left       DATA  Lives With: spouse   Living Arrangements: house  Quality of Family Relationships: supportive, helpful  Description of Support System: Supportive, Involved  Who is your support system?: Wife  Support Assessment: Adequate family and caregiver support, Adequate social supports.   Identified issues/concerns regarding health management:   Quality of Family Relationships: supportive, helpful  Transportation Anticipated: car, drives self    ASSESSMENT  Cognitive Status:  awake, alert and oriented    SW has reviewed pt records. Pt is Tc, an 79 year old who was admitted on 10/30 due to a fall down the stairs, sustained a C3 spinous process fracture, fracture of T2, multiple rib fractures and a left hemothorax. It is recommended currently that pt discharge to TCU when medically appropriate. BERTHA met with pt this morning to introduce self/role, perform assessment, and discuss discharge planning. Pt shared that he has not personally had experience at a TCU however is wife required a stay in the past. Pt was having some difficulty recalling the name of the facility, shared it was in [place]. SW discussed the two TCU facilities in [place], [facility] and [facility]. We discussed sending referrals to these locations, as well as leaving a list in the room to review other options. Pt goal is to return home however understands his limitations currently. BERTHA plans to follow up with pt tomorrow 11/6 to determine other referrals that pt would like to send.     PLAN  Financial costs for the patient includes: None known at this time.  Patient given options and choices for discharge: Yes, provided TCU list.  Patient/family is agreeable to the plan?  Yes  Transportation/person available to transport on day of discharge is and have they been notified/set up.  Patient Goals and Preferences: Home vs TCU depending on process.  Patient  anticipates discharging to: Home vs TCU.    LEV Cr, LICSW  [facility]  Daytime (8:00am-4:30pm): [phone]  After-Hours  Pager (4:30pm-11:30pm): [phone]

## 2019-11-05 NOTE — PROGRESS NOTES
Renal Medicine Progress Note                                Tc Garcia MRN# [id number]   Age: 79 year old YOB: 1940   Date of Admission: 10/30/2019 Hospital LOS: 5                  Assessment/Plan:     Admitted post fall.  Seen regarding acute kidney injury      1.  Baseline creatinine 1.0 mg/dl range   -CKD stage 2 ?  2.  Dipstick proteinuria   3.  ARF   -non oliguric   -US non diagnostic   -no IV contrast    -IV diuretic 11/03   -presumed ATN  4.  DM   -probable early diabetic nephropathy   -documented albuminuria:   mg/g  5.  HTN   -moderate   6.  Hematuria   -low suspicion for acute GN  7.  Hypernatremia        Replace K  Restart ACE when creatinine normalizes  Encourage  free water ingestion  Avoid nephrotoxins  No diuretic       Interval History:     Continued improvement in renal function  Seems more alert today    Increasing UO      ROS:     GENERAL: NAD, No fever,chills  R: NEGATIVE for significant cough or SOB  CV: NEGATIVE for chest pain, palpitations  GI: dysphagia  EXT: no change edema  ROS otherwise negative    Medications and Allergies:     Reviewed    Physical Exam:     Vitals were reviewed  Patient Vitals for the past 8 hrs:   BP Temp Temp src Pulse Heart Rate Resp SpO2   11/05/19 0938 132/82 -- -- -- 100 -- 97 %   11/05/19 0830 (!) 154/84 -- -- -- 99 -- --   11/05/19 0801 (!) 182/103 97.6  F (36.4  C) Oral -- 80 18 97 %   11/05/19 0631 -- -- -- -- -- -- 94 %   11/05/19 0346 (!) 157/92 98.4  F (36.9  C) Oral 86 83 18 100 %     I/O last 3 completed shifts:  In: 4382.17 [P.O.:540; I.V.:3842.17]  Out: 1480 [Urine:1480]    Vitals:    10/30/19 2100 10/31/19 0000 11/02/19 0532 11/03/19 0629   Weight: 88.5 kg (195 lb 1.7 oz) 88.5 kg (195 lb 1.7 oz) 92.5 kg (203 lb 14.8 oz) 95.2 kg (209 lb 14.4 oz)    11/04/19 0630   Weight: 95.3 kg (210 lb 1.6 oz)       GENERAL: awake, alert, follows  HEENT: NC/AT, PERRLA, EOMI, non icteric, pharynx moist without lesion  RESP:  clear anteriorly  CV:  RRR, normal S1 S2  ABDOMEN: soft, nontender, no HSM or masses and bowel sounds normal  MS: no clubbing, cyanosis   SKIN: clear without significant rashes or lesions  NEURO: speech normal and cranial nerves 2-12 intact  PSYCH: affect flat  EXT: trace edema    Data:     Recent Labs   Lab 11/05/19  0721 11/04/19  0640 11/03/19  0558 11/02/19  0713   * 147* 140 141   POTASSIUM 2.9* 3.5 4.3 4.5   CHLORIDE 115* 116* 108 107   CO2 24 23 24 24   ANIONGAP 7 8 8 10   * 149* 256* 372*   BUN 49* 66* 64* 52*   CR 2.30* 3.14* 3.51* 2.72*   GFRESTIMATED 26* 18* 15* 21*   GFRESTBLACK 30* 20* 18* 24*   LLOYD 8.1* 7.6* 7.7* 8.1*         Recent Labs   Lab Test 11/05/19  0721 11/04/19  0640 11/03/19  0558 11/02/19  0713 11/01/19  0631 10/31/19  0744 10/30/19  1729   CR 2.30* 3.14* 3.51* 2.72* 2.99* 2.37* 1.09     Recent Labs   Lab 11/02/19  1010 10/30/19  1729   ALBUMIN 2.8* 3.6     Recent Labs   Lab 11/05/19  0721 11/04/19  0640 11/03/19  0558 11/02/19  0713   HGB 10.0* 9.6* 8.0* 8.2*     Recent Labs   Lab 11/02/19  1400   COLOR Red   APPEARANCE Cloudy   URINEGLC 300*   URINEBILI Negative   URINEKETONE 10*   SG 1.021   UBLD Large*   URINEPH 5.5   PROTEIN 30*   NITRITE Negative   LEUKEST Trace*   RBCU >182*   WBCU 1         G Tank Cooper MD    [facility] - Nephrology  [phone]

## 2019-11-05 NOTE — PLAN OF CARE
Discharge Planner SLP   Patient plan for discharge: did not discuss  Current status: Clinical swallow evaluation orders received as pt reports difficulty swallowing solids. He describes it as sticking at mid-pharyngeal level, sometimes only seems to go down the left side of his throat and also reports frequent belching. He reports difficulty with solids only, specifically with dryer/harder items such as pineapple and dryer french toast. This is all new, no dysphagia at baseline.     Assessment completed with thin liquids, puree solids, semi-solids, general solids. Pt currently presents with mild dysphagia which is likely 2/2 C3 spinous process fracture. Oral phase is functional. Pharyngeal phase notable for suspect delayed swallow, reduced hyolaryngeal ROM to palpation and suspect generalized pharyngeal weakness. No s/s aspiration or dysphagia with thin liquids, puree solids or semi-solids, however with general solids throat clearing/globus sensation observed (?pharyngeal retention).     Recommendations:   1) downgrade to dysphagia diet level 2 (softer, moister) with thin liquids via small single sips (pt verbalized agreement with modifications, menu provided in room)  2) fully upright in chair for meals, slow pacing, small bites/sips, alternate between solids/liquids    Barriers to return to prior living situation: below baseline, dysphagia  Recommendations for discharge: TCU  Rationale for recommendations: Pt currently below baseline for swallow function. Skilled SLP intervention is indicated for safe return to baseline diet of regular/thin consistencies. Will follow 5x/week to monitor tolerance, ADAT and complete further objective testing as needed.        Entered by: Mayra Doran 11/05/2019 12:11 PM

## 2019-11-06 ENCOUNTER — APPOINTMENT (OUTPATIENT)
Dept: GENERAL RADIOLOGY | Facility: CLINIC | Age: 79
DRG: 963 | End: 2019-11-06
Attending: INTERNAL MEDICINE
Payer: COMMERCIAL

## 2019-11-06 ENCOUNTER — APPOINTMENT (OUTPATIENT)
Dept: SPEECH THERAPY | Facility: CLINIC | Age: 79
DRG: 963 | End: 2019-11-06
Attending: INTERNAL MEDICINE
Payer: COMMERCIAL

## 2019-11-06 LAB
ANION GAP SERPL CALCULATED.3IONS-SCNC: 6 MMOL/L (ref 3–14)
BACTERIA SPEC CULT: NO GROWTH
BACTERIA SPEC CULT: NO GROWTH
BUN SERPL-MCNC: 37 MG/DL (ref 7–30)
CALCIUM SERPL-MCNC: 8.2 MG/DL (ref 8.5–10.1)
CHLORIDE SERPL-SCNC: 115 MMOL/L (ref 94–109)
CO2 SERPL-SCNC: 26 MMOL/L (ref 20–32)
CREAT SERPL-MCNC: 1.92 MG/DL (ref 0.66–1.25)
GFR SERPL CREATININE-BSD FRML MDRD: 32 ML/MIN/{1.73_M2}
GLUCOSE BLDC GLUCOMTR-MCNC: 156 MG/DL (ref 70–99)
GLUCOSE BLDC GLUCOMTR-MCNC: 172 MG/DL (ref 70–99)
GLUCOSE BLDC GLUCOMTR-MCNC: 178 MG/DL (ref 70–99)
GLUCOSE BLDC GLUCOMTR-MCNC: 192 MG/DL (ref 70–99)
GLUCOSE BLDC GLUCOMTR-MCNC: 199 MG/DL (ref 70–99)
GLUCOSE BLDC GLUCOMTR-MCNC: 258 MG/DL (ref 70–99)
GLUCOSE BLDC GLUCOMTR-MCNC: 279 MG/DL (ref 70–99)
GLUCOSE SERPL-MCNC: 246 MG/DL (ref 70–99)
LACTATE BLD-SCNC: 2.5 MMOL/L (ref 0.7–2)
Lab: NORMAL
Lab: NORMAL
MAGNESIUM SERPL-MCNC: 2 MG/DL (ref 1.6–2.3)
POTASSIUM SERPL-SCNC: 4 MMOL/L (ref 3.4–5.3)
SODIUM SERPL-SCNC: 147 MMOL/L (ref 133–144)
SPECIMEN SOURCE: NORMAL
SPECIMEN SOURCE: NORMAL

## 2019-11-06 PROCEDURE — 93005 ELECTROCARDIOGRAM TRACING: CPT

## 2019-11-06 PROCEDURE — 12000047 ZZH R&B IMCU

## 2019-11-06 PROCEDURE — 25000131 ZZH RX MED GY IP 250 OP 636 PS 637: Performed by: INTERNAL MEDICINE

## 2019-11-06 PROCEDURE — 99232 SBSQ HOSP IP/OBS MODERATE 35: CPT | Performed by: INTERNAL MEDICINE

## 2019-11-06 PROCEDURE — 71045 X-RAY EXAM CHEST 1 VIEW: CPT

## 2019-11-06 PROCEDURE — 25000132 ZZH RX MED GY IP 250 OP 250 PS 637: Performed by: INTERNAL MEDICINE

## 2019-11-06 PROCEDURE — 25000128 H RX IP 250 OP 636: Performed by: INTERNAL MEDICINE

## 2019-11-06 PROCEDURE — 83735 ASSAY OF MAGNESIUM: CPT | Performed by: INTERNAL MEDICINE

## 2019-11-06 PROCEDURE — 36415 COLL VENOUS BLD VENIPUNCTURE: CPT | Performed by: INTERNAL MEDICINE

## 2019-11-06 PROCEDURE — 80048 BASIC METABOLIC PNL TOTAL CA: CPT | Performed by: INTERNAL MEDICINE

## 2019-11-06 PROCEDURE — 93010 ELECTROCARDIOGRAM REPORT: CPT | Performed by: INTERNAL MEDICINE

## 2019-11-06 PROCEDURE — 83605 ASSAY OF LACTIC ACID: CPT | Performed by: INTERNAL MEDICINE

## 2019-11-06 PROCEDURE — 12000000 ZZH R&B MED SURG/OB

## 2019-11-06 PROCEDURE — 92526 ORAL FUNCTION THERAPY: CPT | Mod: GN | Performed by: SPEECH-LANGUAGE PATHOLOGIST

## 2019-11-06 PROCEDURE — 00000146 ZZHCL STATISTIC GLUCOSE BY METER IP

## 2019-11-06 RX ORDER — CLONIDINE HYDROCHLORIDE 0.1 MG/1
0.3 TABLET ORAL 2 TIMES DAILY
Status: DISCONTINUED | OUTPATIENT
Start: 2019-11-06 | End: 2019-11-10 | Stop reason: HOSPADM

## 2019-11-06 RX ADMIN — APIXABAN 2.5 MG: 2.5 TABLET, FILM COATED ORAL at 20:40

## 2019-11-06 RX ADMIN — OXYCODONE HYDROCHLORIDE 5 MG: 5 TABLET ORAL at 04:37

## 2019-11-06 RX ADMIN — INSULIN ASPART 2 UNITS: 100 INJECTION, SOLUTION INTRAVENOUS; SUBCUTANEOUS at 09:14

## 2019-11-06 RX ADMIN — INSULIN ASPART 3 UNITS: 100 INJECTION, SOLUTION INTRAVENOUS; SUBCUTANEOUS at 16:45

## 2019-11-06 RX ADMIN — SIMVASTATIN 10 MG: 10 TABLET, FILM COATED ORAL at 22:08

## 2019-11-06 RX ADMIN — INSULIN ASPART 3 UNITS: 100 INJECTION, SOLUTION INTRAVENOUS; SUBCUTANEOUS at 12:28

## 2019-11-06 RX ADMIN — OXYCODONE HYDROCHLORIDE 5 MG: 5 TABLET ORAL at 18:54

## 2019-11-06 RX ADMIN — PIPERACILLIN AND TAZOBACTAM 3.38 G: 3; .375 INJECTION, POWDER, LYOPHILIZED, FOR SOLUTION INTRAVENOUS at 09:15

## 2019-11-06 RX ADMIN — PIPERACILLIN AND TAZOBACTAM 3.38 G: 3; .375 INJECTION, POWDER, LYOPHILIZED, FOR SOLUTION INTRAVENOUS at 02:45

## 2019-11-06 RX ADMIN — PANTOPRAZOLE SODIUM 40 MG: 40 TABLET, DELAYED RELEASE ORAL at 16:45

## 2019-11-06 RX ADMIN — INSULIN ASPART 2 UNITS: 100 INJECTION, SOLUTION INTRAVENOUS; SUBCUTANEOUS at 20:40

## 2019-11-06 RX ADMIN — PANTOPRAZOLE SODIUM 40 MG: 40 TABLET, DELAYED RELEASE ORAL at 06:45

## 2019-11-06 RX ADMIN — OXYCODONE HYDROCHLORIDE 5 MG: 5 TABLET ORAL at 22:17

## 2019-11-06 RX ADMIN — CLONIDINE HYDROCHLORIDE 0.3 MG: 0.1 TABLET ORAL at 20:40

## 2019-11-06 RX ADMIN — VERAPAMIL HYDROCHLORIDE 120 MG: 120 TABLET, FILM COATED, EXTENDED RELEASE ORAL at 22:08

## 2019-11-06 RX ADMIN — INSULIN GLARGINE 20 UNITS: 100 INJECTION, SOLUTION SUBCUTANEOUS at 09:14

## 2019-11-06 RX ADMIN — INSULIN ASPART 1 UNITS: 100 INJECTION, SOLUTION INTRAVENOUS; SUBCUTANEOUS at 05:42

## 2019-11-06 RX ADMIN — INSULIN GLARGINE 20 UNITS: 100 INJECTION, SOLUTION SUBCUTANEOUS at 22:09

## 2019-11-06 ASSESSMENT — ACTIVITIES OF DAILY LIVING (ADL)
ADLS_ACUITY_SCORE: 15

## 2019-11-06 NOTE — PROGRESS NOTES
"BRIEF NUTRITION ASSESSMENT      REASON FOR ASSESSMENT:  Tc Garcia is a 79 year old male seen by Registered Dietitian for LOS      CURRENT DIET AND INTAKE:  Diet:  Moderate CHO, DD2, Thin liquids              Chart reviewed  Visited with pt this morning  Notes that over the past few days he has been taking mainly liquids and ice cream  \"It has been a bit hard to swallow\"  Loves apple juice and ice cream  He is agreeable to trying some supplements with meals for added protein/cals    11/5: SLP --> presents with mild dysphagia which is likely 2/2 C3 spinous process fracture, rec DD2, thin    ANTHROPOMETRICS:  Height: 5' 10\"  Weight:(11/4) 95.3 kg /  210 lbs 1.57 oz  Body mass index is 30.15 kg/m .   Weight Status: Obesity Grade I BMI 30-34.9  IBW:  75.4 kg  %IBW: 126%  Weight History:   Wt Readings from Last 10 Encounters:   11/04/19 95.3 kg (210 lb 1.6 oz)   08/10/16 92.5 kg (204 lb)   01/14/10 88.5 kg (195 lb)   08/31/09 88.5 kg (195 lb)   07/02/09 85.7 kg (189 lb)   02/19/09 82.1 kg (181 lb)   11/19/08 81.6 kg (180 lb)   10/23/06 92.1 kg (203 lb)   09/21/06 90.3 kg (199 lb)   09/20/06 90.3 kg (199 lb)         LABS:  Labs noted    MALNUTRITION:  Patient does not meet two of the following criteria necessary for diagnosing malnutrition: significant weight loss, reduced intake, subcutaneous fat loss, muscle loss or fluid retention. Nutrition Focused Physical Assessment (NFPA) not appropriate at this time.    NUTRITION INTERVENTION:  Nutrition Diagnosis:  No nutrition diagnosis at this time.    Implementation:  Nutrition Education ---> Reviewed diet order and available supplements.  Will send a chocolate Boost Glucose Control with meals.  Also send a fruit smoothie with lunch and dinner meals.    FOLLOW UP/MONITORING:   Will re-evaluate in 7 - 10 days, or sooner, if re-consulted.          "

## 2019-11-06 NOTE — PLAN OF CARE
Discharge Planner SLP   Patient plan for discharge: Did not discuss  Current status: Swallow Tx was provided this am.  Throat clearing and hoarse wet voicing were noted in conversation prior to po trial observation.  Intermittent throat clearing was noted during po trials.  Pt demonstrated delayed swallow initiation, decreased elevation, and need for multiple swallows at times.  No overt coughing was noted, and lungs are reported to be clear.  Unable to rule out penetration/aspiration at bedside, therefore, recommend careful monitoring of diet tolerance and lung status.    Recommendations:   1) intermittent supervision with a dysphagia diet level 2  and thin liquids via small single sips, effortful swallows, double swallows as needed, fully upright in chair for meals, slow pacing, small bites, alternate between solids/liquids  2) hold po if increased aspiration signs/decreased respiratory status    Will determine need for video swallow study/FEES as indicated during the course of swallow Tx      Barriers to return to prior living situation: Level of assist  Recommendations for discharge: TCU  Rationale for recommendations: SLP swallow Tx at TCU to maximize safety for a least restrictive diet       Entered by: Delia Choi 11/06/2019 8:52 AM

## 2019-11-06 NOTE — PLAN OF CARE
VSS ex HTN, PRN Hydralazine not given (parameters not met). Tele: A-fib CVR. A/Ox4. CT incision on left abdomen CDI. Large bruise from left abdominal wall to mid dorsal back purple/maroon. Pain controlled with PRN Oxy. Up with 1. Tolerating DD2 thin liquids diet. Denied N&V. +gas, +bm. Voiding+. LS clear. Experiencing kidney issues. Nephrology following. Will continue to monitor.

## 2019-11-06 NOTE — PLAN OF CARE
Shift [id number]: Elevated BP--PRN hydralazine available but order parameters not met. A/O x 4. Lungs: diminished throughout, 1 L O2 NC. BS present, passing flatus, BM x 1. Void: WDL, urinal. Activity: Up w/ 1 assist; GB/walker. Diet: DD2. Pain managed w/ PRN Oxycodone. Tele: a-fib CVR.

## 2019-11-06 NOTE — PROGRESS NOTES
[facility]    Medicine Progress Note - Hospitalist Service       Date of Admission:  10/30/2019  Assessment & Plan   Tc Garcia is a 79 year old male with a history of atrial fibrillation, DM type II, HTN who presented to the ED on 10/30/2019 after fall down the stairs sustaining a C3 spinous process fracture, fracture of T2, multiple left sided rib fractures and moderate left hemothorax      Mechanical fall  Multiple left sided rib fractures   Moderate left hemothorax   C3 spinous process fracture  Fracture of T2  Anemia  Sustained after a fall down the stairs. No dizziness or LOC. No chest pain or palpitations. Numerous fractures (L rib fractures C3 spinous process fx, T2 acute fracure) seen on CT scans of the cervical spine and chest.  Of note CT scan of the head did not show any evidence of bleeding.  In the ED a chest tube was placed as well   - Trauma service ok'd ambulation with assistance  - Ortho spine notes cervical and thoracic fracture stable and ok to mobilize as tolerated with restrictions based on other injuries, no bracing necessary  - Self pulled chest tube overnight 11/2-3, stable without 11/4  - Transfuse Hgb < 7. Transfusion consent is in chart  -  Hgb stable     Delirium. Resolved   Intermittent, pulled IV's, chest tube out overnight. 11/3 morning appropriate but still with mild confusion. Neuro nonfocal.      Alcohol use/ abuse disorder  Has longstanding h/o heavy Etoh use (per wife).  No signs of withdrawal. ? Contributing to fall but wife doesn't think that he was drinking at the time of the fall.   -  No signs of withdrawal     Acute renal failure. Improving   Creatinine on admission 10/30 at 1.09. [place] to 2.37 on 10/31. UOP  Sluggish, 90 ml 10/31. PTA on losartan-hydrochlorothiazide, lisinopril (ACE I and ARB). No noted hypotensive episodes initially but some hypotension to 80-90's systolic during stay as well as bradycardia. ? If relative hypotension with fall and ACEI/  ARB on board. CK sl elevated but not enough to explain. Likely ATN.  - UA remarkable for hematuria (RBC>182), dipstick protein 30, 2 WBC  - Renal US with small R renal cyst, no obstruction  - Avoid nephrotoxins  - Stopped IVF as patient starting to exhibit fluid over load  - Holding ACE I and ARB, would only take either ACE I or ARB in future  - Nephrology following and appreciate their assistance     Bradycardia  Atrial fibrillation   Elevated BNP  HTN/HLP   Normally anticoagulated on Eliquis.  EKG in the ED showed sinus bradycardia. 11/2-11/3 harrison to 30-40's  On 11/6 patient went in to A flutter but has been rate controlled.  Asymptomatic   - Monitor on telemetry  - CT surgery okay with restarting Eliquis.  Will resume this evening   - Discontinued propranolol  - Verapamil has been restarted  - Clonidine restarted today due to elevated blood pressures      Leukocytosis  Admit WBC at 25.6->26.9->25.9->28.2--->22.9.  Mild lactic acidosis on admission.  Procalcitonin quite elevated at 4.69  - Blood cultures with NGTD   - CXR 11/4 relatively unremarkable, ? Small infiltrate L base  - Repeat UA 11/2 without evidence of infection  - Started on empiric zosyn 10/31 and completed a 7 day course today   - MRSA swabs negative  -  Lactate normalized 1    DM type II   PTA on Metformin 500 mg BID, Actos 30 mg and insulin pump.  hgb A1C at 7.7% on admission 10/31. PTA insulin dosing ~50-60 Units daily through pump.   - Holding PTA meds and insulin pump   - Lantus to 20 units BID 11/3-> improved blood sugars  - Moderate SSI   - Mod CHO diet     Anemia  Presumed 2/2 bleeding, following closely as above      Diet: Combination Diet 2376-7784 Calories: Moderate Consistent CHO (4-6 CHO units/meal); Dysphagia Diet Level 2: Mechan Altered; Thin Liquids (water, ice chips, juice, milk gelatin, ice cream, etc)    DVT Prophylaxis: Eliquis  Pruett Catheter: not present  Code Status: Full Code      Disposition Plan   Expected discharge: 1-2  days, recommended to transitional care unit once safe disposition plan/ TCU bed available and cleared by nephrology.  Entered: Lam Triplett DO 11/06/2019, 9:49 AM       The patient's care was discussed with the Bedside Nurse, Care Coordinator/ and Patient.    Lma Triplett DO  Hospitalist Service  [facility]    ______________________________________________________________________    Interval History   Patient seen and examined.  No acute events over night.  Pain is very well controlled. No difficulty breathing.  Still having lower extremity edema but patient notes it is improving.  No fevers or chills.  Tolerating diet.     Data reviewed today: I reviewed all medications, new labs and imaging results over the last 24 hours. I personally reviewed   EKG:  Atrial flutter with variable AV block.  Rate 95 BPM.  No concerning ST or T wave changes to suggest acute ischemia     Physical Exam   Vital Signs: Temp: 98.2  F (36.8  C) Temp src: Oral BP: (!) 156/99 Pulse: 80 Heart Rate: 78 Resp: 18 SpO2: 99 % O2 Device: None (Room air) Oxygen Delivery: 1 LPM  Weight: 210 lbs 1.57 oz  General Appearance: Resting comfortably in chair.  NAD   Respiratory: Clear to auscultation.  No respiratory distress on RA    Cardiovascular: RRR.  No obvious murmurs  GI: Bowel sounds present.  Non-tender  Skin: No rashes.  No cyanosis  Other: 1-2+ lower extremity edema.  No calf tenderness      Data   Recent Labs   Lab 11/06/19  0855 11/05/19  1601 11/05/19  0721 11/04/19  0640 11/03/19  0558 11/02/19  1133 11/02/19  1010  10/31/19  0744 10/30/19  1729   WBC  --   --  22.9* 24.7* 22.3*  --   --    < > 26.9* 25.6*   HGB  --   --  10.0* 9.6* 8.0*  --   --    < > 10.4* 12.2*   MCV  --   --  89 89 89  --   --    < > 90 90   PLT  --   --  288 263 201  --   --    < > 213 262   INR  --   --   --   --   --   --   --   --  1.37* 1.40*   *  --  146* 147* 140  --   --    < > 139 140   POTASSIUM 4.0 3.8 2.9* 3.5  4.3  --   --    < > 4.3 3.2*   CHLORIDE 115*  --  115* 116* 108  --   --    < > 102 104   CO2 26  --  24 23 24  --   --    < > 28 31   BUN 37*  --  49* 66* 64*  --   --    < > 25 15   CR 1.92*  --  2.30* 3.14* 3.51*  --   --    < > 2.37* 1.09   ANIONGAP 6  --  7 8 8  --   --    < > 9 5   LLOYD 8.2*  --  8.1* 7.6* 7.7*  --   --    < > 8.7 8.8   *  --  158* 149* 256*  --   --    < > 199* 117*   ALBUMIN  --   --   --   --   --   --  2.8*  --   --  3.6   PROTTOTAL  --   --   --   --   --   --  5.2*  --   --  6.3*   BILITOTAL  --   --   --   --   --   --  1.3  --   --  0.6   ALKPHOS  --   --   --   --   --   --  58  --   --  73   ALT  --   --   --   --   --   --  33  --   --  30   AST  --   --   --   --   --   --  49*  --   --  44   TROPI  --   --   --   --   --  0.057*  --   --   --   --     < > = values in this interval not displayed.     Recent Results (from the past 24 hour(s))   XR Chest Port 1 View    Narrative    CHEST PORTABLE ONE VIEW   11/6/2019 5:32 AM     HISTORY: Left hemothorax, multiple left rib fractures.    COMPARISON: 11/5/2019.      Impression    IMPRESSION: Stable dense opacification at the left lung base. No new  airspace disease. Stable cardiac silhouette.    ROSA PAGE MD

## 2019-11-06 NOTE — PROVIDER NOTIFICATION
Noticed telemetry change, possible BB block? Obtained 12 lead ekg    Shows atrial flutter, variable AV block w/ PVCs. BP  Paged Dr Triplett. 167/89 HR 90s. Patient denies chest pain, dizziness, SOB     No new orders at this time, will continue to use PRN Hydralazine if parameters met ( > 180 SBP) and catapress will be restarted this evening.

## 2019-11-06 NOTE — PROGRESS NOTES
BERTHA:    I: BERTHA following for discharge planning. BERTHA met with pt again today for follow-up on pt preferences for TCU. Pt talked with family and decided that he would like referral to [facility] along with the other choices already given, stating this is his new first preference. BERTHA faxed via DOD. Per MD, anticipate discharge 11/7 vs 11/8.    P: BERTHA to follow.    LEV Cr, LICSW  [facility]  Daytime (8:00am-4:30pm): [phone]  After-Hours SW Pager (4:30pm-11:30pm): [phone]

## 2019-11-06 NOTE — PLAN OF CARE
"PT: Attempted intervention, however, patient declines. Pt reports he is not feeling well, stating \"my lunch disagreed with me\". Pt requests that therapy return tomorrow. Pt encouraged to ambulate with RN staff later today if feeling better, pt in agreement.  "

## 2019-11-06 NOTE — PROGRESS NOTES
Renal Medicine Progress Note                                Tc Garcia MRN# [id number]   Age: 79 year old YOB: 1940   Date of Admission: 10/30/2019 Hospital LOS: 6                  Assessment/Plan:     Admitted post fall.  Seen regarding acute kidney injury      1.  Baseline creatinine 1.0 mg/dl range   -CKD stage 2 ?  2.  Dipstick proteinuria   3.  ARF   -non oliguric   -US non diagnostic   -no IV contrast    -IV diuretic 11/03   -presumed ATN  4.  DM   -probable early diabetic nephropathy   -documented albuminuria:   mg/g  5.  HTN   -moderate   6.  Hematuria   -low suspicion for acute GN  7.  Hypernatremia      Labs pending from today  Restart ACE when creatinine baseline    Consider diuretic as renal function improves      Interval History:     Up in chair   Taking PO  Good UO   IO positive  Modest LE edema- feet up today      ROS:     GENERAL: NAD, No fever,chills  R: NEGATIVE for significant cough or SOB  CV: NEGATIVE for chest pain, palpitations  GI: dysphagia  EXT: no change edema  ROS otherwise negative    Medications and Allergies:     Reviewed    Physical Exam:     Vitals were reviewed  Patient Vitals for the past 8 hrs:   BP Temp Temp src Pulse Heart Rate Resp SpO2   11/06/19 0735 (!) 156/99 98.2  F (36.8  C) Oral -- 78 18 99 %   11/06/19 0546 (!) 168/88 98  F (36.7  C) Oral 80 89 18 100 %     I/O last 3 completed shifts:  In: 1638 [P.O.:1110; I.V.:528]  Out: 825 [Urine:825]    Vitals:    10/30/19 2100 10/31/19 0000 11/02/19 0532 11/03/19 0629   Weight: 88.5 kg (195 lb 1.7 oz) 88.5 kg (195 lb 1.7 oz) 92.5 kg (203 lb 14.8 oz) 95.2 kg (209 lb 14.4 oz)    11/04/19 0630   Weight: 95.3 kg (210 lb 1.6 oz)       GENERAL: awake, alert, follows  HEENT: NC/AT, PERRLA, EOMI, non icteric, pharynx moist without lesion  RESP:  clear anteriorly  CV: RRR, normal S1 S2  ABDOMEN: soft, nontender, no HSM or masses and bowel sounds normal  MS: no clubbing, cyanosis   SKIN: clear without significant  rashes or lesions  NEURO: speech normal and cranial nerves 2-12 intact  PSYCH: affect flat  EXT: trace edema    Data:     Recent Labs   Lab 11/05/19  1601 11/05/19  0721 11/04/19  0640 11/03/19  0558 11/02/19  0713   NA  --  146* 147* 140 141   POTASSIUM 3.8 2.9* 3.5 4.3 4.5   CHLORIDE  --  115* 116* 108 107   CO2  --  24 23 24 24   ANIONGAP  --  7 8 8 10   GLC  --  158* 149* 256* 372*   BUN  --  49* 66* 64* 52*   CR  --  2.30* 3.14* 3.51* 2.72*   GFRESTIMATED  --  26* 18* 15* 21*   GFRESTBLACK  --  30* 20* 18* 24*   LLOYD  --  8.1* 7.6* 7.7* 8.1*         Recent Labs   Lab Test 11/05/19  0721 11/04/19  0640 11/03/19  0558 11/02/19  0713 11/01/19  0631 10/31/19  0744 10/30/19  1729   CR 2.30* 3.14* 3.51* 2.72* 2.99* 2.37* 1.09     Recent Labs   Lab 11/02/19  1010 10/30/19  1729   ALBUMIN 2.8* 3.6     Recent Labs   Lab 11/05/19  0721 11/04/19  0640 11/03/19  0558 11/02/19  0713   HGB 10.0* 9.6* 8.0* 8.2*     Recent Labs   Lab 11/02/19  1400   COLOR Red   APPEARANCE Cloudy   URINEGLC 300*   URINEBILI Negative   URINEKETONE 10*   SG 1.021   UBLD Large*   URINEPH 5.5   PROTEIN 30*   NITRITE Negative   LEUKEST Trace*   RBCU >182*   WBCU 1     Recent Labs   Lab 11/04/19  1302   UMALCR 372.28*       G Tank Cooper MD    [facility] - Nephrology  [phone]

## 2019-11-07 ENCOUNTER — APPOINTMENT (OUTPATIENT)
Dept: SPEECH THERAPY | Facility: CLINIC | Age: 79
DRG: 963 | End: 2019-11-07
Payer: COMMERCIAL

## 2019-11-07 LAB
ALBUMIN SERPL-MCNC: 2.7 G/DL (ref 3.4–5)
ALBUMIN UR-MCNC: 30 MG/DL
ALP SERPL-CCNC: 94 U/L (ref 40–150)
ALT SERPL W P-5'-P-CCNC: 568 U/L (ref 0–70)
ANION GAP SERPL CALCULATED.3IONS-SCNC: 3 MMOL/L (ref 3–14)
APPEARANCE UR: ABNORMAL
AST SERPL W P-5'-P-CCNC: 198 U/L (ref 0–45)
BILIRUB DIRECT SERPL-MCNC: 0.6 MG/DL (ref 0–0.2)
BILIRUB SERPL-MCNC: 1.8 MG/DL (ref 0.2–1.3)
BILIRUB UR QL STRIP: NEGATIVE
BUN SERPL-MCNC: 28 MG/DL (ref 7–30)
CALCIUM SERPL-MCNC: 8.6 MG/DL (ref 8.5–10.1)
CHLORIDE SERPL-SCNC: 114 MMOL/L (ref 94–109)
CK SERPL-CCNC: 106 U/L (ref 30–300)
CO2 SERPL-SCNC: 30 MMOL/L (ref 20–32)
COLOR UR AUTO: ABNORMAL
CREAT SERPL-MCNC: 1.71 MG/DL (ref 0.66–1.25)
ERYTHROCYTE [DISTWIDTH] IN BLOOD BY AUTOMATED COUNT: 20 % (ref 10–15)
GFR SERPL CREATININE-BSD FRML MDRD: 37 ML/MIN/{1.73_M2}
GGT SERPL-CCNC: 34 U/L (ref 0–75)
GLUCOSE BLDC GLUCOMTR-MCNC: 105 MG/DL (ref 70–99)
GLUCOSE BLDC GLUCOMTR-MCNC: 213 MG/DL (ref 70–99)
GLUCOSE BLDC GLUCOMTR-MCNC: 222 MG/DL (ref 70–99)
GLUCOSE BLDC GLUCOMTR-MCNC: 252 MG/DL (ref 70–99)
GLUCOSE BLDC GLUCOMTR-MCNC: 268 MG/DL (ref 70–99)
GLUCOSE BLDC GLUCOMTR-MCNC: 61 MG/DL (ref 70–99)
GLUCOSE BLDC GLUCOMTR-MCNC: 65 MG/DL (ref 70–99)
GLUCOSE BLDC GLUCOMTR-MCNC: 69 MG/DL (ref 70–99)
GLUCOSE SERPL-MCNC: 78 MG/DL (ref 70–99)
GLUCOSE UR STRIP-MCNC: >1000 MG/DL
HCT VFR BLD AUTO: 32.6 % (ref 40–53)
HGB BLD-MCNC: 10.1 G/DL (ref 13.3–17.7)
HGB UR QL STRIP: ABNORMAL
INR PPP: 1.44 (ref 0.86–1.14)
INTERPRETATION ECG - MUSE: NORMAL
KETONES UR STRIP-MCNC: 5 MG/DL
LACTATE BLD-SCNC: 2.2 MMOL/L (ref 0.7–2)
LEUKOCYTE ESTERASE UR QL STRIP: ABNORMAL
MAGNESIUM SERPL-MCNC: 2 MG/DL (ref 1.6–2.3)
MCH RBC QN AUTO: 29 PG (ref 26.5–33)
MCHC RBC AUTO-ENTMCNC: 31 G/DL (ref 31.5–36.5)
MCV RBC AUTO: 94 FL (ref 78–100)
NITRATE UR QL: NEGATIVE
PH UR STRIP: 5.5 PH (ref 5–7)
PHOSPHATE SERPL-MCNC: 2.4 MG/DL (ref 2.5–4.5)
PLATELET # BLD AUTO: 330 10E9/L (ref 150–450)
POTASSIUM SERPL-SCNC: 4.2 MMOL/L (ref 3.4–5.3)
PROT SERPL-MCNC: 5.2 G/DL (ref 6.8–8.8)
RBC # BLD AUTO: 3.48 10E12/L (ref 4.4–5.9)
RBC #/AREA URNS AUTO: >182 /HPF (ref 0–2)
SODIUM SERPL-SCNC: 147 MMOL/L (ref 133–144)
SOURCE: ABNORMAL
SP GR UR STRIP: 1.02 (ref 1–1.03)
UROBILINOGEN UR STRIP-MCNC: NORMAL MG/DL (ref 0–2)
WBC # BLD AUTO: 20.9 10E9/L (ref 4–11)
WBC #/AREA URNS AUTO: 64 /HPF (ref 0–5)

## 2019-11-07 PROCEDURE — 87106 FUNGI IDENTIFICATION YEAST: CPT | Performed by: INTERNAL MEDICINE

## 2019-11-07 PROCEDURE — 00000146 ZZHCL STATISTIC GLUCOSE BY METER IP

## 2019-11-07 PROCEDURE — 25000132 ZZH RX MED GY IP 250 OP 250 PS 637: Performed by: INTERNAL MEDICINE

## 2019-11-07 PROCEDURE — 84100 ASSAY OF PHOSPHORUS: CPT | Performed by: INTERNAL MEDICINE

## 2019-11-07 PROCEDURE — 82977 ASSAY OF GGT: CPT | Performed by: INTERNAL MEDICINE

## 2019-11-07 PROCEDURE — 25000128 H RX IP 250 OP 636: Performed by: HOSPITALIST

## 2019-11-07 PROCEDURE — 83735 ASSAY OF MAGNESIUM: CPT | Performed by: INTERNAL MEDICINE

## 2019-11-07 PROCEDURE — 85027 COMPLETE CBC AUTOMATED: CPT | Performed by: INTERNAL MEDICINE

## 2019-11-07 PROCEDURE — 80076 HEPATIC FUNCTION PANEL: CPT | Performed by: INTERNAL MEDICINE

## 2019-11-07 PROCEDURE — 12000000 ZZH R&B MED SURG/OB

## 2019-11-07 PROCEDURE — 81001 URINALYSIS AUTO W/SCOPE: CPT | Performed by: HOSPITALIST

## 2019-11-07 PROCEDURE — 93010 ELECTROCARDIOGRAM REPORT: CPT | Performed by: INTERNAL MEDICINE

## 2019-11-07 PROCEDURE — 87086 URINE CULTURE/COLONY COUNT: CPT | Performed by: INTERNAL MEDICINE

## 2019-11-07 PROCEDURE — 80048 BASIC METABOLIC PNL TOTAL CA: CPT | Performed by: INTERNAL MEDICINE

## 2019-11-07 PROCEDURE — 82550 ASSAY OF CK (CPK): CPT | Performed by: HOSPITALIST

## 2019-11-07 PROCEDURE — 99233 SBSQ HOSP IP/OBS HIGH 50: CPT | Performed by: HOSPITALIST

## 2019-11-07 PROCEDURE — 36415 COLL VENOUS BLD VENIPUNCTURE: CPT | Performed by: INTERNAL MEDICINE

## 2019-11-07 PROCEDURE — 85610 PROTHROMBIN TIME: CPT | Performed by: HOSPITALIST

## 2019-11-07 PROCEDURE — 36415 COLL VENOUS BLD VENIPUNCTURE: CPT | Performed by: HOSPITALIST

## 2019-11-07 PROCEDURE — 93005 ELECTROCARDIOGRAM TRACING: CPT

## 2019-11-07 PROCEDURE — 92526 ORAL FUNCTION THERAPY: CPT | Mod: GN | Performed by: SPEECH-LANGUAGE PATHOLOGIST

## 2019-11-07 PROCEDURE — 25000131 ZZH RX MED GY IP 250 OP 636 PS 637: Performed by: INTERNAL MEDICINE

## 2019-11-07 PROCEDURE — 83605 ASSAY OF LACTIC ACID: CPT | Performed by: HOSPITALIST

## 2019-11-07 PROCEDURE — 25000132 ZZH RX MED GY IP 250 OP 250 PS 637: Performed by: HOSPITALIST

## 2019-11-07 PROCEDURE — 25000131 ZZH RX MED GY IP 250 OP 636 PS 637: Performed by: HOSPITALIST

## 2019-11-07 PROCEDURE — 82550 ASSAY OF CK (CPK): CPT | Performed by: INTERNAL MEDICINE

## 2019-11-07 RX ORDER — FOLIC ACID 1 MG/1
1 TABLET ORAL DAILY
Status: DISCONTINUED | OUTPATIENT
Start: 2019-11-07 | End: 2019-11-10 | Stop reason: HOSPADM

## 2019-11-07 RX ORDER — DOCUSATE SODIUM 100 MG/1
100 CAPSULE, LIQUID FILLED ORAL 2 TIMES DAILY
Status: DISCONTINUED | OUTPATIENT
Start: 2019-11-07 | End: 2019-11-10 | Stop reason: HOSPADM

## 2019-11-07 RX ORDER — ACETAMINOPHEN 650 MG/1
650 SUPPOSITORY RECTAL EVERY 4 HOURS PRN
Status: DISCONTINUED | OUTPATIENT
Start: 2019-11-07 | End: 2019-11-10 | Stop reason: HOSPADM

## 2019-11-07 RX ORDER — FUROSEMIDE 10 MG/ML
20 INJECTION INTRAMUSCULAR; INTRAVENOUS ONCE
Status: COMPLETED | OUTPATIENT
Start: 2019-11-07 | End: 2019-11-07

## 2019-11-07 RX ORDER — MULTIVITAMIN,THERAPEUTIC
1 TABLET ORAL DAILY
Status: DISCONTINUED | OUTPATIENT
Start: 2019-11-07 | End: 2019-11-10 | Stop reason: HOSPADM

## 2019-11-07 RX ORDER — ACETAMINOPHEN 325 MG/1
650 TABLET ORAL EVERY 4 HOURS PRN
Status: DISCONTINUED | OUTPATIENT
Start: 2019-11-07 | End: 2019-11-10 | Stop reason: HOSPADM

## 2019-11-07 RX ORDER — MULTIPLE VITAMINS W/ MINERALS TAB 9MG-400MCG
1 TAB ORAL DAILY
Status: DISCONTINUED | OUTPATIENT
Start: 2019-11-07 | End: 2019-11-07

## 2019-11-07 RX ORDER — FUROSEMIDE 10 MG/ML
20 INJECTION INTRAMUSCULAR; INTRAVENOUS ONCE
Status: DISCONTINUED | OUTPATIENT
Start: 2019-11-07 | End: 2019-11-07

## 2019-11-07 RX ORDER — LANOLIN ALCOHOL/MO/W.PET/CERES
100 CREAM (GRAM) TOPICAL DAILY
Status: DISCONTINUED | OUTPATIENT
Start: 2019-11-07 | End: 2019-11-10 | Stop reason: HOSPADM

## 2019-11-07 RX ORDER — BISACODYL 10 MG
10 SUPPOSITORY, RECTAL RECTAL ONCE
Status: COMPLETED | OUTPATIENT
Start: 2019-11-07 | End: 2019-11-07

## 2019-11-07 RX ADMIN — INSULIN ASPART 3 UNITS: 100 INJECTION, SOLUTION INTRAVENOUS; SUBCUTANEOUS at 13:07

## 2019-11-07 RX ADMIN — VERAPAMIL HYDROCHLORIDE 120 MG: 120 TABLET, FILM COATED, EXTENDED RELEASE ORAL at 21:14

## 2019-11-07 RX ADMIN — TRAMADOL HYDROCHLORIDE 25 MG: 50 TABLET ORAL at 19:34

## 2019-11-07 RX ADMIN — CLONIDINE HYDROCHLORIDE 0.3 MG: 0.1 TABLET ORAL at 20:07

## 2019-11-07 RX ADMIN — PANTOPRAZOLE SODIUM 40 MG: 40 TABLET, DELAYED RELEASE ORAL at 06:45

## 2019-11-07 RX ADMIN — INSULIN ASPART 3 UNITS: 100 INJECTION, SOLUTION INTRAVENOUS; SUBCUTANEOUS at 20:49

## 2019-11-07 RX ADMIN — INSULIN GLARGINE 20 UNITS: 100 INJECTION, SOLUTION SUBCUTANEOUS at 09:23

## 2019-11-07 RX ADMIN — CLONIDINE HYDROCHLORIDE 0.3 MG: 0.1 TABLET ORAL at 09:24

## 2019-11-07 RX ADMIN — INSULIN GLARGINE 12 UNITS: 100 INJECTION, SOLUTION SUBCUTANEOUS at 21:14

## 2019-11-07 RX ADMIN — DOCUSATE SODIUM 100 MG: 100 CAPSULE, LIQUID FILLED ORAL at 20:07

## 2019-11-07 RX ADMIN — OXYCODONE HYDROCHLORIDE 5 MG: 5 TABLET ORAL at 03:09

## 2019-11-07 RX ADMIN — THERA TABS 1 TABLET: TAB at 21:14

## 2019-11-07 RX ADMIN — INSULIN ASPART 2 UNITS: 100 INJECTION, SOLUTION INTRAVENOUS; SUBCUTANEOUS at 17:03

## 2019-11-07 RX ADMIN — APIXABAN 2.5 MG: 2.5 TABLET, FILM COATED ORAL at 09:23

## 2019-11-07 RX ADMIN — APIXABAN 2.5 MG: 2.5 TABLET, FILM COATED ORAL at 20:07

## 2019-11-07 RX ADMIN — PANTOPRAZOLE SODIUM 40 MG: 40 TABLET, DELAYED RELEASE ORAL at 17:02

## 2019-11-07 RX ADMIN — Medication 10 MG: at 13:08

## 2019-11-07 RX ADMIN — METOPROLOL TARTRATE 12.5 MG: 25 TABLET, FILM COATED ORAL at 19:34

## 2019-11-07 RX ADMIN — Medication 100 MG: at 19:34

## 2019-11-07 RX ADMIN — FOLIC ACID 1 MG: 1 TABLET ORAL at 19:34

## 2019-11-07 RX ADMIN — FUROSEMIDE 20 MG: 10 INJECTION, SOLUTION INTRAVENOUS at 13:07

## 2019-11-07 RX ADMIN — DOCUSATE SODIUM 100 MG: 100 CAPSULE, LIQUID FILLED ORAL at 13:07

## 2019-11-07 ASSESSMENT — ACTIVITIES OF DAILY LIVING (ADL)
ADLS_ACUITY_SCORE: 15

## 2019-11-07 ASSESSMENT — MIFFLIN-ST. JEOR: SCORE: 1698.25

## 2019-11-07 NOTE — PLAN OF CARE
Vital signs stable on RA ex HTN. HR 90s-110s. Tele Afib RVR w/ PVCs. A/Ox4, forgetful. LS diminished. Prior chest tube site CDI. Suppository today, had loose brown bms. modcho diet, slight nausea this evening. BG 65 this AM, increased 105 after apple juice + milk. Voiding tea/izabel urine. Nephrology following. Gave PRN oxycodone x1 for pain. 1 dose lasix today. Up with assist of 1, gaitbelt, walker. Bilateral +3 lower extremity edema. Legs elevated.    Plan for ultrasound in the AM, NPO ex meds at midnight. Per ren FARRIS to give lantus if BGs >100.     Probable discharge to TCU, ride set up 1030 AM.

## 2019-11-07 NOTE — PROVIDER NOTIFICATION
Paged Dr Hassan Critical ALT of 568. Still give lasix? Also, patient has to be NPO ex meds 8hrs prior to ultrasound. Reschedule for the morning?     Okay to give lasix  Reschedule ultrasound for tomorrow AM. NPO at midnight.

## 2019-11-07 NOTE — PLAN OF CARE
"PT-  Pt refused PT at time of appt.  States he is waiting for his lunch to arrive.  Encouraged walking while he was waiting and pt stated \"then I won't be able to eat anything after.\"  Pt states he will walk later.  "

## 2019-11-07 NOTE — PROVIDER NOTIFICATION
Provider notified: Critical lab Lactic acid 2.5.  Second provider page sent. Provider aware.   No pertinent past medical history

## 2019-11-07 NOTE — PROGRESS NOTES
SW:    I: SW following for discharge planning. [facility] and [facility] been clinically accepted for [facility]. Pt has a [employer] plan that will require prior auth, which admissions has initiated. SW to follow and continue to assist with discharge.    P: SW to follow. Pt to discharge to [facility] when medically appropriate and insurance authorization obtained by facility.    ADDENDUM: [facility] has received auth from [employer]. Per hospitalist, pt will be ready for discharge tomorrow, 11/8/19. Per bedside RN, family requesting wc transport be arranged. BERTHA scheduled this for 1030 on 11/8/19, facility aware. BERTHA plans to connect with pt and family in regarding to discharge timing tomorrow and ensure that they are aware of the private cost for wc transport.    1530: BERTHA spoke with pt re: above plan and is agreeable.    LEV Cr, LICSW  [facility]  Daytime (8:00am-4:30pm): [phone]  After-Hours SW Pager (4:30pm-11:30pm): [phone]

## 2019-11-07 NOTE — PROGRESS NOTES
[facility]  Hospitalist Progress Note   11/07/2019          Assessment and Plan:       Tc Garcia is a 79 year old male with a history of atrial fibrillation, DM type II, HTN admitted on 10/30/2019 after a fall down the stairs sustaining a C3 spinous process fracture, fracture of T2, multiple left sided rib fractures and moderate left hemothorax      Mechanical fall  Multiple left sided rib fractures   Moderate left hemothorax   C3 spinous process fracture  Fracture of T2  Left flank: extensive ecchymosis.  Sustained after a fall down the stairs. No dizziness or LOC. No chest pain or palpitations. Numerous fractures (L rib fractures C3 spinous process fx, T2 acute fracure) seen on CT scans of the cervical spine and chest.   CT scan head did not show any evidence of bleeding.  In the ED placed for left hemothorax.  Was followed by trauma surgery, CT surgery.  Patient self pulled out chest tube overnight 11/2-3.  Was stable.  Repeat imaging no hematoma at previous chest tube site.  Per CT surgery some movement detected at left rib fracture site, some possible mild flailing. No surgical intervention recommended unless patient decompensates significantly from a resp standpoint.   Aggressive incentive spirometry.  Encourage ambulation.  Topical Voltaren gel application as needed for pain.  As needed Tylenol for pain.  Discontinue oxycodone and will start on tramadol.   PT, OT following, plan for transition to TCU.    Acute anemia from blood loss, component of dilutional.  Presented with hemoglobin of 12.2, dropped to 8.2.  And improving to 10.1.  Monitor hemoglobin level AM.  Will transfuse less than 7 hours if symptomatic.    Possible mild cognitive impairment  Delirium. Resolved   Intermittent episode of pulling out IVs, pulled out chest tube 11/3.  This morning awake and able to answer simple commands.  per family report history of heavy alcohol use.  Will need cognitive evaluation as  outpatient.  Discussed no driving until cognitive eval.  Avoid narcotics as able to.     Chronic alcohol abuse.  As long standing history of heavy alcohol use per wife and daughter report.  Lately has been drinking few times a week, can drink as much as half a bottle each time.  Unsure when last drink was.  Unsure if contributed to fall.  No signs of withdrawal during hospitalization.  We will need to consider alcohol cessation.  Started on thiamine multivitamin and folic acid supplements.  Did discuss with family to seek out rehab assistance once transition from TCU     Acute renal failure, presumed ATN. 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  Creatinine on admission 10/30 at 1.09.  Peaked to 3.51.  PTA on losartan-hydrochlorothiazide, lisinopril (ACE I and ARB).   No noted hypotensive episodes initially but some hypotension to 80-90's systolic during stay as well as bradycardia. ? If relative hypotension with fall and ACEI/ ARB on board.  UA remarkable for hematuria (RBC>182), dipstick protein 30, 2 WBC  Renal US with small R renal cyst, no obstruction  To your IV fluids.  Creatinine improved to 1.71.  We will start on diuresis with Lasix 20 mg IV x1.  proBNP a.m.  Holding ACE I and ARB, would only take either ACE I or ARB in future  Avoid nephrotoxins  Nephrology following and appreciate their assistance    Acute liver injury likely ischemic, alcohol use.  Had episode of hypotension with bradycardia.  ALT today on check at 568, .  Bilirubin 1.8.  Hold off statin therapy.  Ultrasound right upper quadrant given history of alcohol use.  Monitor liver function test a.m    Atrial fibrillation with intermittent bradycardia  Hypertension with elevated blood pressures likely secondary to pain.  Normally anticoagulated on Eliquis.    EKG in the ED showed sinus bradycardia. 11/2-11/3 harrison to 30-40's  Echocardiogram with estimated EF at 55 to 60%.  Moderate to severely dilated left atrium, moderate to mild  moderately dilated right atrium.  Right ventricular systolic function mildly reduced.  Mild to moderate TR.  Large left pleural effusion.  On 11/6 patient went in to A flutter but has been rate controlled.  Asymptomatic.  On 11/7 having elevated heart rate in 90s and 100s.  CT surgery okay with restarting Eliquis, restarted 11/6.  Continue PTA verapamil.  PTA propranolol was discontinued given bradycardia.  We will start on metoprolol 12.5 mg twice daily with hold parameters.  Continue PTA clonidine    Volume overload from iatrogenic fluid resuscitation.  Weight gain from 205 > 216 pounds.  Pertinent improving.  Diuresis with 20 mg IV Lasix x1 today.  Strict I's and O's, reassess volume status a.m.    Leukocytosis with possible aspiration pneumonia.  Lactic acidosis likely from fall.  Admit WBC at 25.6->26.9->25.9->28.2--->22.9.    Procalcitonin quite elevated at 4.69.  Lactic acid 4.1.  Blood cultures with NGTD. MRSA swabs negative  CXR 11/4 relatively unremarkable, ? Small infiltrate L base  UA nitrite negative, leukocyte esterase trace.1 WBC.  Was on empiric zosyn 10/31 and completed a 7 day course  Recheck lactic acid, procalcitonin, WBC count.    Hematuria.  UA with large blood, trace leukocyte esterase.  182 RBCs.  Repeat UA urine culture today.     DM type II hemoglobin A1c 7.7.  PTA on Metformin 500 mg BID, Actos 30 mg and insulin pump.   PTA insulin dosing ~50-60 Units daily through pump.   Holding PTA meds and insulin pump   Lantus to 20 units BID 11/3 with moderate intensity sliding scale.  This morning had hypoglycemic episode with blood sugars of 65.  We will switch to insulin Lantus 12 units twice daily.  Mod CHO diet    Physical deconditioning from acute illness, senile fragility.  PT, OT recommending TCU.       Orders Placed This Encounter      Combination Diet 7828-3934 Calories: Moderate Consistent CHO (4-6 CHO units/meal); Dysphagia Diet Level 2: Mechan Altered; Thin Liquids (water, ice chips,  juice, milk gelatin, ice cream, etc)      DVT Prophylaxis: SCD, Eliquis   Code Status: Full Code  Disposition: Expected discharge in 1- 2 days pending clinical improvement.    Discussed with patient, his wife, daughter, bedside RN  Total time greater than 35 minutes.    Ashtyn Hassan MD        Interval History:      Patient sitting up in chair.  Mild shortness of breath.  Complains of chest discomfort.  No nausea vomiting.  No headache or dizziness.  No difficulty passing urine.  Last bowel movement 3 days back.  Minimal ambulation out of bed with therapy.         Physical Exam:        Physical Exam   Temp:  [97.6  F (36.4  C)-98.2  F (36.8  C)] 97.6  F (36.4  C)  Pulse:  [81-98] 97  Heart Rate:  [] 107  Resp:  [16-18] 16  BP: (122-187)/() 155/105  SpO2:  [95 %-98 %] 97 %    Intake/Output Summary (Last 24 hours) at 11/7/2019 1002  Last data filed at 11/7/2019 0806  Gross per 24 hour   Intake 480 ml   Output --   Net 480 ml     Admission Weight: 93 kg (205 lb)  Current Weight: 98.2 kg (216 lb 7.9 oz)    PHYSICAL EXAM  GENERAL: Patient is in no distress. Alert and oriented, able to answer simple commands.  HEART: Regular rate and rhythm. S1S2. No murmurs  LUNGS: Bilateral decreased breath sounds, faint crackles present.  ABDOMEN: Soft, no abdominal tenderness, bowel sounds heard   NEURO: Moving all extremities, no focal weakness.  EXTREMITIES: 2+ pitting pedal edema. 2+ peripheral pulses.  SKIN: Warm, dry.  Extensive bruising over the back.  PSYCHIATRY Cooperative       Medications:          sodium chloride 0.9%  500 mL Intravenous Once     apixaban ANTICOAGULANT  2.5 mg Oral BID     cloNIDine  0.3 mg Oral BID     insulin aspart  1-6 Units Subcutaneous Q4H     insulin glargine  20 Units Subcutaneous BID     pantoprazole  40 mg Oral BID AC     simvastatin  10 mg Oral At Bedtime     sodium chloride (PF)  3 mL Intracatheter Q8H     sodium chloride (PF)  3 mL Intracatheter Q8H     verapamil ER  120 mg Oral  At Bedtime     bacitracin, glucose **OR** dextrose **OR** glucagon, hydrALAZINE, HYDROmorphone, lidocaine 4%, lidocaine 4%, lidocaine (buffered or not buffered), lidocaine (buffered or not buffered), LORazepam, magnesium hydroxide, metoclopramide **OR** metoclopramide, naloxone, nitroGLYcerin, nitroGLYcerin, ondansetron **OR** ondansetron, oxyCODONE, polyethylene glycol, potassium chloride, potassium chloride with lidocaine, potassium chloride, potassium chloride, potassium chloride, senna-docusate **OR** senna-docusate, sodium chloride (PF), sodium chloride (PF)         Data:      All new lab and imaging data was reviewed.

## 2019-11-07 NOTE — PROGRESS NOTES
Sepsis Evaluation Progress Note    I was called to see Tc Garcia due to abnormal vital signs triggering the Sepsis SIRS screening alert. He is not known to have an infection. Lactate 2.5    Physical Exam   Vital Signs:  Temp: 97.8  F (36.6  C) Temp src: Oral BP: (!) 187/100 Pulse: 98 Heart Rate: 110 Resp: 18 SpO2: 97 % O2 Device: None (Room air)      Lab:  Lactic Acid   Date Value Ref Range Status   11/04/2019 1.7 0.7 - 2.0 mmol/L Final     Lactate for Sepsis Protocol   Date Value Ref Range Status   11/06/2019 2.5 (H) 0.7 - 2.0 mmol/L Final     Comment:     Significant value called to and read back by  TERESA PEREZ ON 33 AT 1944 EJV         The patient is at baseline mental status.     The rest of their physical exam is significant for no new findings    Assessment & Plan   NO EVIDENCE OF SEPSIS at this time.  Vital sign, physical exam, and lab findings are likely due to trauma from recent fall.    Disposition: The patient will remain on the current unit. We will continue to monitor this patient closely   .  Lena Lovett MD    Sepsis Criteria   Sepsis: 2+ SIRS criteria due to infection  Severe Sepsis: Sepsis AND 1+ new sign of acute organ dysfunction (Note: lactate >2 is organ dysfunction)  Septic Shock: Sepsis AND hypotension despite volume resuscitation with 30 ml/kg crystalloid

## 2019-11-07 NOTE — PLAN OF CARE
Discharge Planner SLP   Patient plan for discharge: [facility]  Current status: SLP: Pt upright in chair. Reviewed current diet and swallow strategies. Pt reported improved tolerance of diet and enjoying textures of DD2 diet. Pt denied advanced diet trials.  Frequent belching and throat clearing noted prior to PO trials. Thin liquids assessed with no immediate overt s/sx of aspiration, however, suspect esophageal dysfunction with frequent belching and pt report of reflux symptoms.     Pt verbalized agreement with dysphagia diet level 2 and thin liquids via small single sips, effortful swallows, double swallows as needed, fully upright in chair for meals, slow pacing, small bites, alternate between solids/liquids.     Barriers to return to prior living situation: Deconditioning; dysphagia  Recommendations for discharge: TCU  Rationale for recommendations: SLP follow up for strategy training and trials of regular textures as indicated.        Entered by: Deepti Logan 11/07/2019 2:27 PM

## 2019-11-07 NOTE — PLAN OF CARE
Vital signs stable on RA ex HTN. HR 90s-110s, EKG done. See provider notification. A/Ox4, forgetful. LS diminished. Prior chest tube site CDI. BS active. passing gas, no bm today. modcho diet, slight nausea this evening. Bgs mid 200s, coverage as ordered. Voiding tea/izabel urine. Nephrology following. Gave PRN oxycodone x1 for pain. Up with assist of 1, gaitbelt, walker. Bilateral +3 lower extremity edema. Legs elevated.     Sepsis protocol triggered, oncoming nurse updated.

## 2019-11-07 NOTE — PROGRESS NOTES
Renal Medicine Progress Note                                Tc Garcia MRN# [id number]   Age: 79 year old YOB: 1940   Date of Admission: 10/30/2019 Hospital LOS: 7                  Assessment/Plan:     Admitted post fall.  Seen regarding acute kidney injury      1.  Baseline creatinine 1.0 mg/dl range   -CKD stage 2 ?  2.  Dipstick proteinuria   3.  ARF   -non oliguric   -US non diagnostic   -no IV contrast    -IV diuretic 11/03   -presumed ATN  4.  DM   -probable early diabetic nephropathy   -documented albuminuria:   mg/g  5.  HTN   -moderate control  6.  Hematuria   -low suspicion for acute GN  7.  Hypernatremia      Encourage water intake given modest hypernatremia  No ACE until creatinine in 1.0 mg/dl range    Will need 2x/week BMP initially at TCU      Interval History:     No clinical change  Edema same  No SOB        ROS:     GENERAL: NAD, No fever,chills  R: NEGATIVE for significant cough or SOB  CV: NEGATIVE for chest pain, palpitations  GI: dysphagia  EXT: no change edema  ROS otherwise negative    Medications and Allergies:     Reviewed    Physical Exam:     Vitals were reviewed  Patient Vitals for the past 8 hrs:   BP Temp Temp src Pulse Heart Rate Resp SpO2 Weight   11/07/19 0748 (!) 155/105 97.6  F (36.4  C) Oral 97 107 16 97 % --   11/07/19 0600 -- -- -- -- -- -- -- 98.2 kg (216 lb 7.9 oz)     I/O last 3 completed shifts:  In: 360 [P.O.:360]  Out: -     Vitals:    10/31/19 0000 11/02/19 0532 11/03/19 0629 11/04/19 0630   Weight: 88.5 kg (195 lb 1.7 oz) 92.5 kg (203 lb 14.8 oz) 95.2 kg (209 lb 14.4 oz) 95.3 kg (210 lb 1.6 oz)    11/07/19 0600   Weight: 98.2 kg (216 lb 7.9 oz)       GENERAL: awake, alert, follows  HEENT: NC/AT, PERRLA, EOMI, non icteric, pharynx moist without lesion  RESP:  clear anteriorly  CV: RRR, normal S1 S2  ABDOMEN: soft, nontender, no HSM or masses and bowel sounds normal  MS: no clubbing, cyanosis   SKIN: clear without significant rashes or  lesions  NEURO: speech normal and cranial nerves 2-12 intact  PSYCH: affect flat  EXT: trace edema    Data:     Recent Labs   Lab 11/07/19  0714 11/06/19  0855 11/05/19  1601 11/05/19  0721 11/04/19  0640   * 147*  --  146* 147*   POTASSIUM 4.2 4.0 3.8 2.9* 3.5   CHLORIDE 114* 115*  --  115* 116*   CO2 30 26  --  24 23   ANIONGAP 3 6  --  7 8   GLC 78 246*  --  158* 149*   BUN 28 37*  --  49* 66*   CR 1.71* 1.92*  --  2.30* 3.14*   GFRESTIMATED 37* 32*  --  26* 18*   GFRESTBLACK 43* 37*  --  30* 20*   LLOYD 8.6 8.2*  --  8.1* 7.6*         Recent Labs   Lab Test 11/07/19  0714 11/06/19  0855 11/05/19  0721 11/04/19  0640 11/03/19  0558 11/02/19  0713 11/01/19  0631 10/31/19  0744 10/30/19  1729   CR 1.71* 1.92* 2.30* 3.14* 3.51* 2.72* 2.99* 2.37* 1.09     Recent Labs   Lab 11/02/19  1010   ALBUMIN 2.8*     Recent Labs   Lab 11/05/19  0721 11/04/19  0640 11/03/19  0558 11/02/19  0713   HGB 10.0* 9.6* 8.0* 8.2*     Recent Labs   Lab 11/02/19  1400   COLOR Red   APPEARANCE Cloudy   URINEGLC 300*   URINEBILI Negative   URINEKETONE 10*   SG 1.021   UBLD Large*   URINEPH 5.5   PROTEIN 30*   NITRITE Negative   LEUKEST Trace*   RBCU >182*   WBCU 1     Recent Labs   Lab 11/04/19  1302   UMALCR 372.28*       G Tank Cooper MD    [facility] - Nephrology  [phone]

## 2019-11-07 NOTE — PLAN OF CARE
VSS on RA ex HTN, parameters not met for prn hydralazine. Tele: A-fib. A/Ox4. Blood sugars: 200s, mid 150s, AM blood sugar 61, re-check 65. Labs: Lactic acid 2.5, MD aware. Incisions: CT incision CDI. Large purple/ maroon bruise from L abd wall to mid dorsal back. CMS intact. Pain controlled with prn oxy. Up with 1 and walker. Tolerating mod carb DD2 diet. Denies N&V. +gas. Voiding adequately. Will continue to monitor.

## 2019-11-08 ENCOUNTER — APPOINTMENT (OUTPATIENT)
Dept: GENERAL RADIOLOGY | Facility: CLINIC | Age: 79
DRG: 963 | End: 2019-11-08
Attending: HOSPITALIST
Payer: COMMERCIAL

## 2019-11-08 ENCOUNTER — APPOINTMENT (OUTPATIENT)
Dept: ULTRASOUND IMAGING | Facility: CLINIC | Age: 79
DRG: 963 | End: 2019-11-08
Attending: HOSPITALIST
Payer: COMMERCIAL

## 2019-11-08 ENCOUNTER — APPOINTMENT (OUTPATIENT)
Dept: PHYSICAL THERAPY | Facility: CLINIC | Age: 79
DRG: 963 | End: 2019-11-08
Payer: COMMERCIAL

## 2019-11-08 LAB
ALBUMIN SERPL-MCNC: 2.4 G/DL (ref 3.4–5)
ALP SERPL-CCNC: 85 U/L (ref 40–150)
ALT SERPL W P-5'-P-CCNC: 365 U/L (ref 0–70)
ANION GAP SERPL CALCULATED.3IONS-SCNC: 3 MMOL/L (ref 3–14)
AST SERPL W P-5'-P-CCNC: 88 U/L (ref 0–45)
BASOPHILS # BLD AUTO: 0 10E9/L (ref 0–0.2)
BASOPHILS NFR BLD AUTO: 0.1 %
BILIRUB SERPL-MCNC: 1.5 MG/DL (ref 0.2–1.3)
BUN SERPL-MCNC: 25 MG/DL (ref 7–30)
CALCIUM SERPL-MCNC: 8.3 MG/DL (ref 8.5–10.1)
CHLORIDE SERPL-SCNC: 111 MMOL/L (ref 94–109)
CO2 SERPL-SCNC: 30 MMOL/L (ref 20–32)
CREAT SERPL-MCNC: 1.58 MG/DL (ref 0.66–1.25)
CRP SERPL-MCNC: 11.3 MG/L (ref 0–8)
DIFFERENTIAL METHOD BLD: ABNORMAL
EOSINOPHIL # BLD AUTO: 0.5 10E9/L (ref 0–0.7)
EOSINOPHIL NFR BLD AUTO: 3.3 %
ERYTHROCYTE [DISTWIDTH] IN BLOOD BY AUTOMATED COUNT: 20.3 % (ref 10–15)
GFR SERPL CREATININE-BSD FRML MDRD: 41 ML/MIN/{1.73_M2}
GLUCOSE BLDC GLUCOMTR-MCNC: 121 MG/DL (ref 70–99)
GLUCOSE BLDC GLUCOMTR-MCNC: 166 MG/DL (ref 70–99)
GLUCOSE BLDC GLUCOMTR-MCNC: 373 MG/DL (ref 70–99)
GLUCOSE BLDC GLUCOMTR-MCNC: 389 MG/DL (ref 70–99)
GLUCOSE BLDC GLUCOMTR-MCNC: 92 MG/DL (ref 70–99)
GLUCOSE SERPL-MCNC: 100 MG/DL (ref 70–99)
HCT VFR BLD AUTO: 28.7 % (ref 40–53)
HGB BLD-MCNC: 9 G/DL (ref 13.3–17.7)
IMM GRANULOCYTES # BLD: 0.2 10E9/L (ref 0–0.4)
IMM GRANULOCYTES NFR BLD: 1.2 %
IRON SATN MFR SERPL: 23 % (ref 15–46)
IRON SERPL-MCNC: 44 UG/DL (ref 35–180)
LACTATE BLD-SCNC: 1 MMOL/L (ref 0.7–2)
LYMPHOCYTES # BLD AUTO: 1.1 10E9/L (ref 0.8–5.3)
LYMPHOCYTES NFR BLD AUTO: 7.4 %
MCH RBC QN AUTO: 29.1 PG (ref 26.5–33)
MCHC RBC AUTO-ENTMCNC: 31.4 G/DL (ref 31.5–36.5)
MCV RBC AUTO: 93 FL (ref 78–100)
MONOCYTES # BLD AUTO: 2.4 10E9/L (ref 0–1.3)
MONOCYTES NFR BLD AUTO: 16.3 %
NEUTROPHILS # BLD AUTO: 10.4 10E9/L (ref 1.6–8.3)
NEUTROPHILS NFR BLD AUTO: 71.7 %
NRBC # BLD AUTO: 0 10*3/UL
NRBC BLD AUTO-RTO: 0 /100
NT-PROBNP SERPL-MCNC: 3253 PG/ML (ref 0–1800)
PLATELET # BLD AUTO: 250 10E9/L (ref 150–450)
POTASSIUM SERPL-SCNC: 3.8 MMOL/L (ref 3.4–5.3)
PROCALCITONIN SERPL-MCNC: 0.18 NG/ML
PROT SERPL-MCNC: 4.6 G/DL (ref 6.8–8.8)
RBC # BLD AUTO: 3.09 10E12/L (ref 4.4–5.9)
SODIUM SERPL-SCNC: 144 MMOL/L (ref 133–144)
TIBC SERPL-MCNC: 194 UG/DL (ref 240–430)
TROPONIN I SERPL-MCNC: 0.21 UG/L (ref 0–0.04)
TROPONIN I SERPL-MCNC: 0.27 UG/L (ref 0–0.04)
TROPONIN I SERPL-MCNC: 0.29 UG/L (ref 0–0.04)
TSH SERPL DL<=0.005 MIU/L-ACNC: 3.9 MU/L (ref 0.4–4)
WBC # BLD AUTO: 14.4 10E9/L (ref 4–11)

## 2019-11-08 PROCEDURE — 86140 C-REACTIVE PROTEIN: CPT | Performed by: HOSPITALIST

## 2019-11-08 PROCEDURE — 85025 COMPLETE CBC W/AUTO DIFF WBC: CPT | Performed by: HOSPITALIST

## 2019-11-08 PROCEDURE — 25000132 ZZH RX MED GY IP 250 OP 250 PS 637: Performed by: INTERNAL MEDICINE

## 2019-11-08 PROCEDURE — 83550 IRON BINDING TEST: CPT | Performed by: HOSPITALIST

## 2019-11-08 PROCEDURE — 71046 X-RAY EXAM CHEST 2 VIEWS: CPT

## 2019-11-08 PROCEDURE — 00000146 ZZHCL STATISTIC GLUCOSE BY METER IP

## 2019-11-08 PROCEDURE — 80053 COMPREHEN METABOLIC PANEL: CPT | Performed by: HOSPITALIST

## 2019-11-08 PROCEDURE — 25000131 ZZH RX MED GY IP 250 OP 636 PS 637: Performed by: HOSPITALIST

## 2019-11-08 PROCEDURE — 83540 ASSAY OF IRON: CPT | Performed by: HOSPITALIST

## 2019-11-08 PROCEDURE — 25000131 ZZH RX MED GY IP 250 OP 636 PS 637: Performed by: INTERNAL MEDICINE

## 2019-11-08 PROCEDURE — 97110 THERAPEUTIC EXERCISES: CPT | Mod: GP

## 2019-11-08 PROCEDURE — 36415 COLL VENOUS BLD VENIPUNCTURE: CPT | Performed by: HOSPITALIST

## 2019-11-08 PROCEDURE — 12000000 ZZH R&B MED SURG/OB

## 2019-11-08 PROCEDURE — 84443 ASSAY THYROID STIM HORMONE: CPT | Performed by: HOSPITALIST

## 2019-11-08 PROCEDURE — 84484 ASSAY OF TROPONIN QUANT: CPT | Performed by: HOSPITALIST

## 2019-11-08 PROCEDURE — 99221 1ST HOSP IP/OBS SF/LOW 40: CPT | Performed by: INTERNAL MEDICINE

## 2019-11-08 PROCEDURE — 71100 X-RAY EXAM RIBS UNI 2 VIEWS: CPT | Mod: LT

## 2019-11-08 PROCEDURE — 83605 ASSAY OF LACTIC ACID: CPT | Performed by: HOSPITALIST

## 2019-11-08 PROCEDURE — 83880 ASSAY OF NATRIURETIC PEPTIDE: CPT | Performed by: HOSPITALIST

## 2019-11-08 PROCEDURE — 99233 SBSQ HOSP IP/OBS HIGH 50: CPT | Performed by: HOSPITALIST

## 2019-11-08 PROCEDURE — 76705 ECHO EXAM OF ABDOMEN: CPT

## 2019-11-08 PROCEDURE — 84145 PROCALCITONIN (PCT): CPT | Performed by: HOSPITALIST

## 2019-11-08 PROCEDURE — 25000132 ZZH RX MED GY IP 250 OP 250 PS 637: Performed by: HOSPITALIST

## 2019-11-08 PROCEDURE — 25000128 H RX IP 250 OP 636: Performed by: HOSPITALIST

## 2019-11-08 RX ORDER — CEFTRIAXONE 1 G/1
1 INJECTION, POWDER, FOR SOLUTION INTRAMUSCULAR; INTRAVENOUS EVERY 24 HOURS
Status: DISCONTINUED | OUTPATIENT
Start: 2019-11-08 | End: 2019-11-08

## 2019-11-08 RX ORDER — FUROSEMIDE 10 MG/ML
20 INJECTION INTRAMUSCULAR; INTRAVENOUS ONCE
Status: COMPLETED | OUTPATIENT
Start: 2019-11-08 | End: 2019-11-08

## 2019-11-08 RX ORDER — METOPROLOL TARTRATE 25 MG/1
25 TABLET, FILM COATED ORAL 2 TIMES DAILY
Status: DISCONTINUED | OUTPATIENT
Start: 2019-11-08 | End: 2019-11-10

## 2019-11-08 RX ADMIN — PANTOPRAZOLE SODIUM 40 MG: 40 TABLET, DELAYED RELEASE ORAL at 11:10

## 2019-11-08 RX ADMIN — FOLIC ACID 1 MG: 1 TABLET ORAL at 11:24

## 2019-11-08 RX ADMIN — CLONIDINE HYDROCHLORIDE 0.3 MG: 0.1 TABLET ORAL at 20:14

## 2019-11-08 RX ADMIN — APIXABAN 2.5 MG: 2.5 TABLET, FILM COATED ORAL at 20:14

## 2019-11-08 RX ADMIN — INSULIN GLARGINE 12 UNITS: 100 INJECTION, SOLUTION SUBCUTANEOUS at 21:00

## 2019-11-08 RX ADMIN — THERA TABS 1 TABLET: TAB at 11:23

## 2019-11-08 RX ADMIN — METOPROLOL TARTRATE 25 MG: 25 TABLET ORAL at 20:14

## 2019-11-08 RX ADMIN — FUROSEMIDE 20 MG: 10 INJECTION, SOLUTION INTRAVENOUS at 16:00

## 2019-11-08 RX ADMIN — CLONIDINE HYDROCHLORIDE 0.3 MG: 0.1 TABLET ORAL at 11:22

## 2019-11-08 RX ADMIN — FUROSEMIDE 20 MG: 10 INJECTION, SOLUTION INTRAVENOUS at 11:08

## 2019-11-08 RX ADMIN — METOPROLOL TARTRATE 12.5 MG: 25 TABLET, FILM COATED ORAL at 11:25

## 2019-11-08 RX ADMIN — VERAPAMIL HYDROCHLORIDE 120 MG: 120 TABLET, FILM COATED, EXTENDED RELEASE ORAL at 21:00

## 2019-11-08 RX ADMIN — PANTOPRAZOLE SODIUM 40 MG: 40 TABLET, DELAYED RELEASE ORAL at 16:00

## 2019-11-08 RX ADMIN — DOCUSATE SODIUM 100 MG: 100 CAPSULE, LIQUID FILLED ORAL at 11:24

## 2019-11-08 RX ADMIN — APIXABAN 2.5 MG: 2.5 TABLET, FILM COATED ORAL at 11:23

## 2019-11-08 RX ADMIN — TRAMADOL HYDROCHLORIDE 25 MG: 50 TABLET ORAL at 23:21

## 2019-11-08 RX ADMIN — INSULIN ASPART 5 UNITS: 100 INJECTION, SOLUTION INTRAVENOUS; SUBCUTANEOUS at 19:16

## 2019-11-08 RX ADMIN — DOCUSATE SODIUM 100 MG: 100 CAPSULE, LIQUID FILLED ORAL at 20:14

## 2019-11-08 RX ADMIN — INSULIN ASPART 5 UNITS: 100 INJECTION, SOLUTION INTRAVENOUS; SUBCUTANEOUS at 21:00

## 2019-11-08 RX ADMIN — Medication 100 MG: at 11:25

## 2019-11-08 ASSESSMENT — ACTIVITIES OF DAILY LIVING (ADL)
ADLS_ACUITY_SCORE: 15

## 2019-11-08 ASSESSMENT — MIFFLIN-ST. JEOR: SCORE: 1705.25

## 2019-11-08 NOTE — PROGRESS NOTES
[facility]  Hospitalist Progress Note   11/08/2019          Assessment and Plan:       Tc Garcia is a 79 year old male with a history of atrial fibrillation, DM type II, HTN admitted on 10/30/2019 after a fall down the stairs sustaining a C3 spinous process fracture, fracture of T2, multiple left sided rib fractures and moderate left hemothorax      Status post mechanical fall  Multiple left sided rib fractures   Moderate left hemothorax   C3 spinous process fracture  Fracture of T2  Left flank: extensive ecchymosis.  Sustained after a fall down the stairs. No dizziness or LOC. No chest pain or palpitations. Numerous fractures (L rib fractures C3 spinous process fx, T2 acute fracure) seen on CT scans of the cervical spine and chest.   CT scan head did not show any evidence of bleeding.  .  In the ED had chest tube for left hemothorax. Was followed by trauma surgery, CT surgery.  Patient self pulled out chest tube overnight 11/2-3.  Was stable.  Repeat imaging no hematoma at previous chest tube site.  Per CT surgery some movement detected at left rib fracture site, some possible mild flailing. No surgical intervention recommended unless patient decompensates significantly from a resp standpoint.   Repeat chest x-ray for follow-up of pleural effusion, x-ray left ribs for follow-up of rib fractures ordered today   Aggressive incentive spirometry.  Encourage ambulation.  As needed Tylenol for pain. Tramadol 25 to 50 mg every 6 hours as needed for severe pain.  PT, OT following, plan for transition to TCU.    Atrial fibrillation on coagulation.  Sinus bradycardia with junctional escape at 11/2.  Nonsustained V. tach 11/7.  Hypertension with elevated blood pressures likely secondary to pain.  Normally anticoagulated on Eliquis.    EKG in the ED showed sinus bradycardia. 11/2-11/3 harrison to 30-40's  Echocardiogram with estimated EF at 55 to 60%.  Moderate to severely dilated left atrium, moderate  to mild moderately dilated right atrium.  Right ventricular systolic function mildly reduced.  Mild to moderate TR.  Large left pleural effusion.  On 11/6 patient went in to A flutter but has been rate controlled, 11/7 elevated heart rate in 90s to 100 and late in the day had 14 beat run of V. tach.  Patient did complain of chest pain during that episode.   Trend troponin x 3  EP consulted given arrhythmias ongoing.  Telemetry monitoring.  Eliquis restarted 11/6 after okay by CT surgery.  To continue.  Continue PTA verapamil (11/4)  PTA propranolol was discontinued given bradycardia.  Started on metoprolol 12.5 mg [11/7] twice daily with hold parameters.  Continue PTA clonidine (11/6)    Volume overload from iatrogenic fluid resuscitation.  Weight gain from 205 > 216 pounds.  proBNP 3253.  Diuresis with 20 mg IV Lasix x1 (11/7) -repeat 20 mg IV Lasix for 2 doses today.  Reassess volume status, weights a.m. and accordingly further diuresis.  Strict I's and O's, daily weights.      Acute renal failure, presumed ATN. 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  Creatinine on admission 10/30 at 1.09.  Peaked to 3.51.  PTA on losartan-hydrochlorothiazide, lisinopril (ACE I and ARB).   Some hypotension to 80-90's systolic during stay as well as bradycardia. ACEI/ ARB on board.  UA remarkable for hematuria (RBC>182), dipstick protein 30, 2 WBC  Renal US with small R renal cyst, no obstruction.   Creatinine improved to 1.58.  Continue diuresis with Lasix today as above.  Holding ACE I and ARB, would only take either ACE I or ARB in future  Avoid nephrotoxins  Nephrology signed off 11/8.  Appreciate assistance.    Acute liver injury likely ischemic, alcohol use.  Some hypotension to 80-90's systolic during stay as well as bradycardia.  On 11/7 - , .  Bilirubin 1.8.  Ultrasound right upper quadrant given history of alcohol use pending   Monitor liver function test a.m, avoid hepatotoxic drugs.  Hold off  statin therapy.    Leukocytosis with possible aspiration pneumonia.  Lactic acidosis likely from fall.  Admit WBC at 25.6-> 14.4  Procalcitonin 4.69 > 0.18.  Lactic acid 4.1 >1.0  Blood cultures with NGTD. MRSA swabs negative  CXR 11/4 relatively unremarkable, ? Small infiltrate L base  UA nitrite negative, leukocyte esterase trace.1 WBC.  Was on empiric zosyn 10/31 and completed a 7 day course  Repeat chest x-ray today.  Aspiration precautions.    Acute anemia from blood loss, component of dilutional.  Presented with hemoglobin of 12.2, dropped to 8.2.  Serum iron 44, iron saturation index 23.   Monitor hemoglobin level AM.  Will transfuse less than 7 hours if symptomatic.    Possible mild cognitive impairment  Delirium. Resolved   Intermittent episode of pulling out IVs, pulled out chest tube 11/3.  This morning awake and able to answer simple commands. Per family report history of heavy alcohol use.  Will need cognitive evaluation as outpatient. Discussed no driving until cognitive eval.  Avoid narcotics as able to.     Chronic alcohol abuse.  Has long standing history of heavy alcohol use per wife and daughter report.  Lately has been drinking few times a week, can drink as much as half a bottle each time.  Family unsure when last drink was.  Unsure if contributed to fall. No signs of withdrawal during hospitalization.  We will need to consider alcohol cessation.  Continue thiamine multivitamin and folic acid supplements.  Did discuss with family to seek out rehab assistance once transition from TCU    Hematuria  UA from admission with large blood, RBCs.  UA 11/7 showing possible moderate leukocyte esterase with 64 WBCs, 182 RBCs.  Urine cultures 11/7 negative. Completed course of IV Zosyn [11/6].    Will consult urology given persistent hematuria, patient on anticoagulation.  Timed voiding.     DM type II hemoglobin A1c 7.7.  PTA on Metformin 500 mg BID, Actos 30 mg and insulin pump.   PTA insulin dosing ~50-60  Units daily through pump.   Holding PTA meds and insulin pump   Was on Lantus 20 units BID (11/3 ) switched to insulin Lantus 12 units twice daily (11/7) for hypoglycemia.  Continue Lantus 12 units twice daily.  Continue sliding scale insulin.  Moderate carbohydrate controlled diet.    Physical deconditioning from acute illness, senile fragility.  PT, OT recommending TCU.    Obesity with a BMI of 31.28.  We will need to consider lifestyle modification with diet and exercise as able to.  Will need to consider sleep studies as outpatient.    Orders Placed This Encounter      Combination Diet 5297-8741 Calories: Moderate Consistent CHO (4-6 CHO units/meal); Dysphagia Diet Level 2: Mechan Altered; Thin Liquids (water, ice chips, juice, milk gelatin, ice cream, etc)      DVT Prophylaxis: SCD, Eliquis   Code Status: Full Code  Disposition: Expected discharge in 1- 2 days pending clinical improvement.    Discussed with patient, his wife, bedside RN.  [Discussed with family on 11/7]  Total time greater than 35 minutes.    Ashtyn Hassan MD        Interval History:      Patient sitting up in chair  Continues to have Mild shortness of breath.    Over night had  Chest pain with 14 beat run of V tach.  No nausea vomiting.  No headache or dizziness.  No difficulty passing urine.  Minimal ambulation out of bed with therapy.         Physical Exam:        Physical Exam   Temp:  [97.6  F (36.4  C)-98.3  F (36.8  C)] 98.1  F (36.7  C)  Pulse:  [] 85  Heart Rate:  [] 85  Resp:  [16-20] 18  BP: (146-190)/(79-91) 159/91  SpO2:  [96 %-98 %] 97 %    Intake/Output Summary (Last 24 hours) at 11/7/2019 1002  Last data filed at 11/7/2019 0806  Gross per 24 hour   Intake 480 ml   Output --   Net 480 ml     Admission Weight: 93 kg (205 lb)  Current Weight: 98.2 kg (216 lb 7.9 oz)    PHYSICAL EXAM  GENERAL: Patient is in no distress.   Alert and oriented, able to answer simple commands.  HEART: irregular rate and rhythm. S1S2.   Systolic murmur right sternal border.  LUNGS: Bilateral decreased breath sounds, no wheezing, no crackles  ABDOMEN: Soft, no abdominal tenderness, bowel sounds heard   NEURO: Moving all extremities, no focal weakness.  EXTREMITIES: 2+ pitting pedal edema. 2+ peripheral pulses.  SKIN: Warm, dry.  Extensive bruising over the back.  PSYCHIATRY Cooperative       Medications:          sodium chloride 0.9%  500 mL Intravenous Once     apixaban ANTICOAGULANT  2.5 mg Oral BID     cloNIDine  0.3 mg Oral BID     docusate sodium  100 mg Oral BID     folic acid  1 mg Oral Daily     insulin aspart  1-6 Units Subcutaneous Q4H     insulin glargine  12 Units Subcutaneous BID     metoprolol tartrate  12.5 mg Oral BID     multivitamin, therapeutic  1 tablet Oral Daily     pantoprazole  40 mg Oral BID AC     sodium chloride (PF)  3 mL Intracatheter Q8H     sodium chloride (PF)  3 mL Intracatheter Q8H     verapamil ER  120 mg Oral At Bedtime     vitamin B1  100 mg Oral Daily     acetaminophen **OR** acetaminophen, bacitracin, glucose **OR** dextrose **OR** glucagon, diclofenac, hydrALAZINE, lidocaine 4%, lidocaine 4%, lidocaine (buffered or not buffered), lidocaine (buffered or not buffered), magnesium hydroxide, metoclopramide **OR** metoclopramide, naloxone, nitroGLYcerin, ondansetron **OR** ondansetron, polyethylene glycol, potassium chloride, potassium chloride with lidocaine, potassium chloride, potassium chloride, potassium chloride, senna-docusate **OR** senna-docusate, sodium chloride (PF), sodium chloride (PF), traMADol         Data:      All new lab and imaging data was reviewed.

## 2019-11-08 NOTE — PLAN OF CARE
VSS on RA ex HTN. Tele: A-fib. A/Ox4. Blood sugars: Low 200s, mid 100s. Labs: elevated liver enzymes. Incisions: CT CDI. CMS intact. Pain controlled with prn tramadol. Up with a/o 1 and walker. Tolerating NPO diet. Denies N&V. +gas, +bm. Voiding adequately. LS diminished. Will continue to monitor.

## 2019-11-08 NOTE — PROGRESS NOTES
SW:    I: SW following for discharge planning. Pt is not medically appropriate for discharge today, now has several tests pending. SW canceled HE transport for today and rescheduled for 11/9 at 1330. SW updated [facility], will plan for pt tomorrow 11/9 at 1330 if he is ready.     P: SW to follow. SW to continue to update [facility] as able.    PAS-RR    D: Per DHS regulation, SW completed and submitted PAS-RR to [place] [employer] Direct Connect via the Senior LinkAge Line.  PAS-RR confirmation # is : [id number]    I: SW spoke with pt and they are aware a PAS-RR has been submitted.  SW reviewed with pt that they may be contacted for a follow up appointment within 10 days of hospital discharge if their SNF stay is < 30 days.  Contact information for [facility] LinkAge Line was also provided.    A: Pt verbalized understanding.    P: Further questions may be directed to [facility] LinkAge Line at #[phone], option #4 for PAS-RR staff.      [facility] [address], [facility]  [facility]  Daytime (8:00am-4:30pm): [phone]  After-Hours SW Pager (4:30pm-11:30pm): [phone]

## 2019-11-08 NOTE — PLAN OF CARE
PT-  Attempted to see pt for PT this AM but pt was in the bathroom and then busy with another caregiver.  Unable to see pt in AM.

## 2019-11-08 NOTE — PLAN OF CARE
Discharge Planner PT   Patient plan for discharge: TCU  Current status: Pt was up in a chair upon PT arrival, pleasant. Pt politely declined any ambulation as he had been up to the restroom repeatedly this date, RN confirmed and reported that pt was able to walk some short distance with assist but without the walker. Pt did participate well with seated LE exercises. Encouraged pt to continue with exercises on his own and to walk in the hallway with nursing staff at least one more time today.  Barriers to return to prior living situation: level of assist required, weakness, fall risk, limited mobility  Recommendations for discharge: TCU  Rationale for recommendations: Pt would benefit from PT at TCU to progress strength, balance and mobility so pt can return home safely.        Entered by: Ginny Poole 11/08/2019 4:18 PM

## 2019-11-08 NOTE — CONSULTS
[facility]    Cardiac Electrophysiology Consultation     Date of Admission:  10/30/2019  Date of Consult (When I saw the patient): 11/08/19    Assessment & Plan   Tc Garcia is a 79 year old male who was admitted on 10/30/2019. I was asked to see the patient for pAf. Recently d/x with pAf a few months ago on rate control with Verapamil 240 mg daily along with Inderal 60 mg daily and Eliquis. No sxs with Af. Known normal cardiac structure and function. Presented with a fall after he slipped and fell down the stairs resulting in a few left fractured ribs with pneumothorax and C3 and T2 fracture. Fortunately, stable enough not requiring surgery.     Presenting ECG on 10/30 shows nsr but on 11/2 at 6 am sinus harrison with junctional escape at 45 bpm noted. Asymptomatic throughout. Verapamil was stopped. Noted to in AFib with ventricular rate of 110 noted a few days ago and yesterday had a 14 beat run of nsvt. Asymptomatic.  Pt noted sharp chest pain last night. ECG shows AF but no ST changes. Initial trop 0.288 trending down to 0.266.    Mechanical fall resulting in multiple fractures. Severe sinus harrison noted early am likely a combination of vagal tone and AVN blocker drug. AF not unexpected in this setting and remains asymptomatic. nsvt noted but given normal LVEF no further eval needed. No indication for ppm at this point in time. Mild troponin leak likely secondary to tachycardia from AF. Conservative medical treatment for now. Continue with Eliquis.    Increase metoprolol to 25 mg bid for now with target ventricular rate <110 at rest when in AFib. May need to titrate up metoprolol slowly.     Quinton Paredes    Code Status    Full Code    Primary Care Physician   Senthil Moya    History is obtained from the patient    Past Medical History   I have reviewed this patient's medical history and updated it with pertinent information if needed.   Past Medical History:   Diagnosis Date     Diabetic  PROTEINURIA      Mixed hyperlipidemia      Personal history of colonic polyps 1972    gets colonoscopy every five years, due in      Rosacea      Type II or unspecified type diabetes mellitus without mention of complication, not stated as uncontrolled      Unspecified essential hypertension        Past Surgical History   I have reviewed this patient's surgical history and updated it with pertinent information if needed.  Past Surgical History:   Procedure Laterality Date     HC REMOVE TONSILS/ADENOIDS,<10 Y/O      T & A <11y.o.       Prior to Admission Medications   Prior to Admission Medications   Prescriptions Last Dose Informant Patient Reported? Taking?   ACCU-CHEK COMPACT TEST DRUM VI STRP   No No   Sig: check blood sugar three times a day and PRN   INSULIN PUMP - OUTPATIENT   Yes Yes   Sig: Novolog Insulin  BASAL RATES and times:  All day: 0.95 units/hour    CARB RATIO: 1 unit per 15 grams  Correction Factor (Sensitivity): 55mg/dL  BLOOD GLUCOSE TARGET and times:  12   AM (midnight): 100 - 140  5:30     AM:  100 - 120  10   PM:  100 - 140  Active Insulin Time:  5 hours     (Per Rx, pt uses 50-60 units/day)   LISINOPRIL 40 MG PO TABS 10/30/2019 at Unknown time  No Yes   Si TABLET DAILY   LOVASTATIN 20 MG PO TABS 10/29/2019 at Unknown time  No Yes   Si TABLET DAILY AT DINNER   VERAPAMIL HCL### 240 MG OR TBCR 10/30/2019 at Unknown time  No Yes   Si TABLET DAILY   apixaban ANTICOAGULANT (ELIQUIS) 5 MG tablet 10/30/2019 at Unknown time  Yes Yes   Sig: Take 5 mg by mouth 2 times daily   cloNIDine (CATAPRES) 0.3 MG tablet 10/30/2019 at Unknown time  Yes Yes   Sig: Take 0.3 mg by mouth 2 times daily   glucagon 1 MG kit prn  Yes Yes   Si mg as needed for low blood sugar   losartan-hydrochlorothiazide (HYZAAR) 100-25 MG tablet 10/30/2019 at Unknown time  Yes Yes   Sig: Take 1 tablet by mouth daily   metFORMIN (GLUCOPHAGE) 500 MG tablet 10/30/2019 at Unknown time  Yes Yes   Sig:  Take 500 mg by mouth 2 times daily (with meals)   minocycline (MINOCIN/DYNACIN) 100 MG capsule 10/30/2019 at Unknown time  Yes Yes   Sig: Take 100 mg by mouth 2 times daily    pioglitazone (ACTOS) 30 MG tablet 10/30/2019 at Unknown time  Yes Yes   Sig: Take 30 mg by mouth daily   propranolol (INDERAL) 60 MG tablet 10/30/2019 at Unknown time  Yes Yes   Sig: Take 60 mg by mouth At Bedtime    triamcinolone (KENALOG) 0.1 % external ointment prn  Yes Yes   Sig: Apply topically daily as needed for irritation      Facility-Administered Medications: None     Allergies   Allergies   Allergen Reactions     No Known Drug Allergies        Social History   I have reviewed this patient's social history and updated it with pertinent information if needed. Tc Garcia  reports that he quit smoking about 11 years ago. His smoking use included cigarettes. He has a 8.00 pack-year smoking history. He has never used smokeless tobacco. He reports current alcohol use of about 35.0 standard drinks of alcohol per week.    Family History   I have reviewed this patient's family history and updated it with pertinent information if needed.   Family History   Problem Relation Age of Onset     Family History Negative Mother          age 71     Family History Negative Father          age 70     Diabetes Brother         alive age 77     Diabetes Brother         alive age 76     Family History Negative Brother              Family History Negative Brother              Diabetes Sister         alive age76     Family History Negative Sister          age 86     Heart Disease Sister              Family History Negative Sister              Family History Negative Sister              Diabetes Sister      Diabetes Sister      Diabetes Brother      Diabetes Brother        Review of Systems   Comprehensive review of systems was performed with pertinent positives and negatives listed in assessment  and plan section.    Physical Exam   Temp: 98.1  F (36.7  C) Temp src: Oral BP: (!) 159/91 Pulse: 85 Heart Rate: 85 Resp: 18 SpO2: 97 % O2 Device: None (Room air)    Vital Signs with Ranges  Temp:  [97.6  F (36.4  C)-98.3  F (36.8  C)] 98.1  F (36.7  C)  Pulse:  [85-91] 85  Heart Rate:  [] 85  Resp:  [16-20] 18  BP: (146-190)/(79-91) 159/91  SpO2:  [96 %-98 %] 97 %  218 lbs .56 oz    Constitutional: awake, alert, cooperative, no apparent distress, and appears stated age  Eyes: Lids and lashes normal, pupils equal, round and reactive to light, extra ocular muscles intact, sclera clear, conjunctiva normal  ENT: Normocephalic, without obvious abnormality, atraumatic, sinuses nontender on palpation, external ears without lesions, oral pharynx with moist mucous membranes, tonsils without erythema or exudates, gums normal and good dentition.  Hematologic / Lymphatic: no cervical lymphadenopathy  Respiratory: No increased work of breathing, good air exchange, clear to auscultation bilaterally, no crackles or wheezing  Cardiovascular: irregularly irregular rhythm  GI: No scars, normal bowel sounds, soft, non-distended, non-tender, no masses palpated, no hepatosplenomegally  Skin: no bruising or bleeding  Musculoskeletal: There is no redness, warmth, or swelling of the joints.  Full range of motion noted.    Neurologic: Awake, alert,   Neuropsychiatric: General: normal, calm and normal eye contact    Data   I personally reviewed all recent ECGs and images.  Results for orders placed or performed during the hospital encounter of 10/30/19 (from the past 24 hour(s))   Lactic acid whole blood   Result Value Ref Range    Lactic Acid 2.2 (H) 0.7 - 2.0 mmol/L   INR   Result Value Ref Range    INR 1.44 (H) 0.86 - 1.14   Glucose by meter   Result Value Ref Range    Glucose 222 (H) 70 - 99 mg/dL   EKG 12-lead, tracing only   Result Value Ref Range    Interpretation ECG Click View Image link to view waveform and result    Glucose  by meter   Result Value Ref Range    Glucose 252 (H) 70 - 99 mg/dL   Glucose by meter   Result Value Ref Range    Glucose 213 (H) 70 - 99 mg/dL   Glucose by meter   Result Value Ref Range    Glucose 166 (H) 70 - 99 mg/dL   Glucose by meter   Result Value Ref Range    Glucose 121 (H) 70 - 99 mg/dL   Comprehensive metabolic panel   Result Value Ref Range    Sodium 144 133 - 144 mmol/L    Potassium 3.8 3.4 - 5.3 mmol/L    Chloride 111 (H) 94 - 109 mmol/L    Carbon Dioxide 30 20 - 32 mmol/L    Anion Gap 3 3 - 14 mmol/L    Glucose 100 (H) 70 - 99 mg/dL    Urea Nitrogen 25 7 - 30 mg/dL    Creatinine 1.58 (H) 0.66 - 1.25 mg/dL    GFR Estimate 41 (L) >60 mL/min/[1.73_m2]    GFR Estimate If Black 47 (L) >60 mL/min/[1.73_m2]    Calcium 8.3 (L) 8.5 - 10.1 mg/dL    Bilirubin Total 1.5 (H) 0.2 - 1.3 mg/dL    Albumin 2.4 (L) 3.4 - 5.0 g/dL    Protein Total 4.6 (L) 6.8 - 8.8 g/dL    Alkaline Phosphatase 85 40 - 150 U/L     (H) 0 - 70 U/L    AST 88 (H) 0 - 45 U/L   Lactic acid whole blood   Result Value Ref Range    Lactic Acid 1.0 0.7 - 2.0 mmol/L   Nt probnp inpatient   Result Value Ref Range    N-Terminal Pro BNP Inpatient 3,253 (H) 0 - 1,800 pg/mL   Procalcitonin   Result Value Ref Range    Procalcitonin 0.18 ng/ml   TSH with free T4 reflex   Result Value Ref Range    TSH 3.90 0.40 - 4.00 mU/L   Iron and iron binding capacity   Result Value Ref Range    Iron 44 35 - 180 ug/dL    Iron Binding Cap 194 (L) 240 - 430 ug/dL    Iron Saturation Index 23 15 - 46 %   CBC with platelets differential   Result Value Ref Range    WBC 14.4 (H) 4.0 - 11.0 10e9/L    RBC Count 3.09 (L) 4.4 - 5.9 10e12/L    Hemoglobin 9.0 (L) 13.3 - 17.7 g/dL    Hematocrit 28.7 (L) 40.0 - 53.0 %    MCV 93 78 - 100 fl    MCH 29.1 26.5 - 33.0 pg    MCHC 31.4 (L) 31.5 - 36.5 g/dL    RDW 20.3 (H) 10.0 - 15.0 %    Platelet Count 250 150 - 450 10e9/L    Diff Method Automated Method     % Neutrophils 71.7 %    % Lymphocytes 7.4 %    % Monocytes 16.3 %    %  Eosinophils 3.3 %    % Basophils 0.1 %    % Immature Granulocytes 1.2 %    Nucleated RBCs 0 0 /100    Absolute Neutrophil 10.4 (H) 1.6 - 8.3 10e9/L    Absolute Lymphocytes 1.1 0.8 - 5.3 10e9/L    Absolute Monocytes 2.4 (H) 0.0 - 1.3 10e9/L    Absolute Eosinophils 0.5 0.0 - 0.7 10e9/L    Absolute Basophils 0.0 0.0 - 0.2 10e9/L    Abs Immature Granulocytes 0.2 0 - 0.4 10e9/L    Absolute Nucleated RBC 0.0    CRP inflammation   Result Value Ref Range    CRP Inflammation 11.3 (H) 0.0 - 8.0 mg/L   Troponin I   Result Value Ref Range    Troponin I ES 0.288 (HH) 0.000 - 0.045 ug/L   XR Ribs Left 2 Views    Narrative    RIBS LEFT TWO VIEWS   11/8/2019 10:25 AM     HISTORY:  Follow up for rib fractures.    COMPARISON: 11/6/2019      Impression    IMPRESSION: Again there are displaced fractures of the left second  through ninth ribs. These do not appear healed.   XR Chest 2 Views    Narrative    CHEST TWO VIEWS 11/8/2019 10:26 AM     HISTORY: Follow up for pleural effusion, infiltrate.    COMPARISON: November 8, 2019       Impression    IMPRESSION: Small to moderate left pleural effusion with associated  atelectasis and/or infiltrate. Minimal right pleural fluid. Upper  lungs clear. The cardiac silhouette is not enlarged. Pulmonary  vasculature is unremarkable.   US Abdomen Limited    Narrative    ULTRASOUND ABDOMEN LIMITED  11/8/2019 10:49 AM     HISTORY: Transaminitis, alcohol use, evaluate for liver cirrhosis,  masses.    COMPARISON: October 31, 2019    FINDINGS:  Liver is course in echogenicity without gross focal  lesions, examination is limited by breathing motion and overlying  bowel gas. Gallbladder is normal without stones or sludge.  Extrahepatic bile duct is normal in diameter. Pancreas is completely  obscured. Right kidney is normal in size. There is no hydronephrosis.      Impression    IMPRESSION:  No definite hepatoma demonstrated, nonspecific coarse  echogenicity of the liver.      Glucose by meter   Result  Value Ref Range    Glucose 92 70 - 99 mg/dL   Troponin I   Result Value Ref Range    Troponin I ES 0.266 () 0.000 - 0.045 ug/L

## 2019-11-08 NOTE — PROGRESS NOTES
Renal Medicine Progress Note                                Tc Garcia MRN# [id number]   Age: 79 year old YOB: 1940   Date of Admission: 10/30/2019 Hospital LOS: 8                  Assessment/Plan:     Admitted post fall.  Seen regarding acute kidney injury      1.  Baseline creatinine 1.0 mg/dl range   -CKD stage 2 ?  2.  Dipstick proteinuria   3.  ARF   -non oliguric   -US non diagnostic   -no IV contrast    -IV diuretic 11/03   -presumed ATN  4.  DM   -probable early diabetic nephropathy   -documented albuminuria:   mg/g  5.  HTN   -moderate control  6.  Hematuria   -low suspicion for acute GN  7.  Hypernatremia      Na level better  Continued gradual fall in creatinine    No ACE until creatinine baseline  No ACE and ARB together    Call with questions  Signing off      Interval History:     In bed  Comfortable  Events of last evening noted    IO and UO reviewed  Labs stable to improved        ROS:     GENERAL: NAD, No fever,chills  R: NEGATIVE for significant cough or SOB  CV: NEGATIVE for chest pain, palpitations  GI: dysphagia  EXT: no change edema  ROS otherwise negative    Medications and Allergies:     Reviewed    Physical Exam:     Vitals were reviewed  Patient Vitals for the past 8 hrs:   BP Temp Temp src Pulse Heart Rate Resp SpO2 Weight   11/08/19 0834 (!) 159/91 -- -- -- -- -- -- --   11/08/19 0733 (!) 190/85 98.1  F (36.7  C) Oral 85 85 18 97 % --   11/08/19 0638 -- -- -- -- -- -- -- 98.9 kg (218 lb 0.6 oz)     I/O last 3 completed shifts:  In: 480 [P.O.:480]  Out: 300 [Urine:300]    Vitals:    11/02/19 0532 11/03/19 0629 11/04/19 0630 11/07/19 0600   Weight: 92.5 kg (203 lb 14.8 oz) 95.2 kg (209 lb 14.4 oz) 95.3 kg (210 lb 1.6 oz) 98.2 kg (216 lb 7.9 oz)    11/08/19 0638   Weight: 98.9 kg (218 lb 0.6 oz)       GENERAL: awake, alert, follows  HEENT: NC/AT, PERRLA, EOMI, non icteric, pharynx moist without lesion  RESP:  clear anteriorly  CV: RRR, normal S1 S2  ABDOMEN: soft,  nontender, no HSM or masses and bowel sounds normal  MS: no clubbing, cyanosis   SKIN: clear without significant rashes or lesions  NEURO: speech normal and cranial nerves 2-12 intact  PSYCH: affect flat  EXT: trace edema    Data:     Recent Labs   Lab 11/08/19  0658 11/07/19  0714 11/06/19  0855 11/05/19  1601 11/05/19  0721    147* 147*  --  146*   POTASSIUM 3.8 4.2 4.0 3.8 2.9*   CHLORIDE 111* 114* 115*  --  115*   CO2 30 30 26  --  24   ANIONGAP 3 3 6  --  7   * 78 246*  --  158*   BUN 25 28 37*  --  49*   CR 1.58* 1.71* 1.92*  --  2.30*   GFRESTIMATED 41* 37* 32*  --  26*   GFRESTBLACK 47* 43* 37*  --  30*   LLOYD 8.3* 8.6 8.2*  --  8.1*         Recent Labs   Lab Test 11/08/19  0658 11/07/19  0714 11/06/19  0855 11/05/19  0721 11/04/19  0640 11/03/19  0558 11/02/19  0713 11/01/19  0631 10/31/19  0744 10/30/19  1729   CR 1.58* 1.71* 1.92* 2.30* 3.14* 3.51* 2.72* 2.99* 2.37* 1.09     Recent Labs   Lab 11/08/19 0658 11/07/19  0714 11/02/19  1010   ALBUMIN 2.4* 2.7* 2.8*     Recent Labs   Lab 11/08/19  0658 11/07/19  0714 11/05/19  0721 11/04/19  0640   PHOS  --  2.4*  --   --    HGB 9.0* 10.1* 10.0* 9.6*     Recent Labs   Lab 11/07/19  1105   COLOR Dark Brown   APPEARANCE Cloudy   URINEGLC >1000*   URINEBILI Negative   URINEKETONE 5*   SG 1.017   UBLD Large*   URINEPH 5.5   PROTEIN 30*   NITRITE Negative   LEUKEST Moderate*   RBCU >182*   WBCU 64*     Recent Labs   Lab 11/04/19  1302   UMALCR 372.28*       G Tank Cooper MD    [facility] - Nephrology  [phone]

## 2019-11-08 NOTE — PROGRESS NOTES
X-cover    Called re: run of Vtach and chest pain. Noted on telemetry to have apparent 14 beat run of vtach. EKG with aflutter, no obvious ischemic changes and unchanged from previous. Chest pain in center of chest, sharp, lasted 1-2 minutes, no associated sx. Restarting home metoprolol this evening. On telemetry, will monitor overnight, check troponin in the am. Further evaluation if recurrence of vtach on tele or recurrent chest pain.     Yahir Butler M.D.  Hospitalist  Pager [phone]  Text Page

## 2019-11-08 NOTE — PROVIDER NOTIFICATION
1900: Paged MD regarding 14 beat run of vta. Patient was ambulating in the room. Complained of chest pain 7/10, but quickly resolved by the time stat ekg was obtained. MD came to bedside to evaluate, gave bedtime dose of 12.5mg metoprolol early which decreased HR to 80s at time of writing. Patient resting comfortably.

## 2019-11-09 ENCOUNTER — APPOINTMENT (OUTPATIENT)
Dept: CT IMAGING | Facility: CLINIC | Age: 79
DRG: 963 | End: 2019-11-09
Attending: UROLOGY
Payer: COMMERCIAL

## 2019-11-09 ENCOUNTER — APPOINTMENT (OUTPATIENT)
Dept: SPEECH THERAPY | Facility: CLINIC | Age: 79
DRG: 963 | End: 2019-11-09
Payer: COMMERCIAL

## 2019-11-09 LAB
ALBUMIN SERPL-MCNC: 2.4 G/DL (ref 3.4–5)
ALP SERPL-CCNC: 105 U/L (ref 40–150)
ALT SERPL W P-5'-P-CCNC: 257 U/L (ref 0–70)
ANION GAP SERPL CALCULATED.3IONS-SCNC: 5 MMOL/L (ref 3–14)
AST SERPL W P-5'-P-CCNC: 53 U/L (ref 0–45)
BACTERIA SPEC CULT: ABNORMAL
BILIRUB SERPL-MCNC: 1.5 MG/DL (ref 0.2–1.3)
BUN SERPL-MCNC: 26 MG/DL (ref 7–30)
CALCIUM SERPL-MCNC: 8.1 MG/DL (ref 8.5–10.1)
CHLORIDE SERPL-SCNC: 103 MMOL/L (ref 94–109)
CO2 SERPL-SCNC: 29 MMOL/L (ref 20–32)
CREAT SERPL-MCNC: 1.5 MG/DL (ref 0.66–1.25)
GFR SERPL CREATININE-BSD FRML MDRD: 43 ML/MIN/{1.73_M2}
GLUCOSE BLDC GLUCOMTR-MCNC: 197 MG/DL (ref 70–99)
GLUCOSE BLDC GLUCOMTR-MCNC: 244 MG/DL (ref 70–99)
GLUCOSE BLDC GLUCOMTR-MCNC: 255 MG/DL (ref 70–99)
GLUCOSE BLDC GLUCOMTR-MCNC: 290 MG/DL (ref 70–99)
GLUCOSE BLDC GLUCOMTR-MCNC: 348 MG/DL (ref 70–99)
GLUCOSE BLDC GLUCOMTR-MCNC: 375 MG/DL (ref 70–99)
GLUCOSE SERPL-MCNC: 405 MG/DL (ref 70–99)
Lab: ABNORMAL
POTASSIUM SERPL-SCNC: 4 MMOL/L (ref 3.4–5.3)
PROT SERPL-MCNC: 4.5 G/DL (ref 6.8–8.8)
SODIUM SERPL-SCNC: 137 MMOL/L (ref 133–144)
SPECIMEN SOURCE: ABNORMAL

## 2019-11-09 PROCEDURE — 74178 CT ABD&PLV WO CNTR FLWD CNTR: CPT

## 2019-11-09 PROCEDURE — 99232 SBSQ HOSP IP/OBS MODERATE 35: CPT | Performed by: INTERNAL MEDICINE

## 2019-11-09 PROCEDURE — 92526 ORAL FUNCTION THERAPY: CPT | Mod: GN | Performed by: SPEECH-LANGUAGE PATHOLOGIST

## 2019-11-09 PROCEDURE — 80053 COMPREHEN METABOLIC PANEL: CPT | Performed by: INTERNAL MEDICINE

## 2019-11-09 PROCEDURE — 25000131 ZZH RX MED GY IP 250 OP 636 PS 637: Performed by: HOSPITALIST

## 2019-11-09 PROCEDURE — 25000132 ZZH RX MED GY IP 250 OP 250 PS 637: Performed by: INTERNAL MEDICINE

## 2019-11-09 PROCEDURE — 25000128 H RX IP 250 OP 636: Performed by: INTERNAL MEDICINE

## 2019-11-09 PROCEDURE — 00000146 ZZHCL STATISTIC GLUCOSE BY METER IP

## 2019-11-09 PROCEDURE — 36415 COLL VENOUS BLD VENIPUNCTURE: CPT | Performed by: INTERNAL MEDICINE

## 2019-11-09 PROCEDURE — 25000132 ZZH RX MED GY IP 250 OP 250 PS 637: Performed by: HOSPITALIST

## 2019-11-09 PROCEDURE — 25000125 ZZHC RX 250: Performed by: INTERNAL MEDICINE

## 2019-11-09 PROCEDURE — 25000131 ZZH RX MED GY IP 250 OP 636 PS 637: Performed by: INTERNAL MEDICINE

## 2019-11-09 PROCEDURE — 12000000 ZZH R&B MED SURG/OB

## 2019-11-09 RX ORDER — FUROSEMIDE 10 MG/ML
20 INJECTION INTRAMUSCULAR; INTRAVENOUS ONCE
Status: COMPLETED | OUTPATIENT
Start: 2019-11-09 | End: 2019-11-09

## 2019-11-09 RX ORDER — DEXTROSE MONOHYDRATE 25 G/50ML
25-50 INJECTION, SOLUTION INTRAVENOUS
Status: DISCONTINUED | OUTPATIENT
Start: 2019-11-09 | End: 2019-11-09

## 2019-11-09 RX ORDER — IOPAMIDOL 755 MG/ML
100 INJECTION, SOLUTION INTRAVASCULAR ONCE
Status: COMPLETED | OUTPATIENT
Start: 2019-11-09 | End: 2019-11-09

## 2019-11-09 RX ORDER — FUROSEMIDE 40 MG
40 TABLET ORAL DAILY
Status: DISCONTINUED | OUTPATIENT
Start: 2019-11-09 | End: 2019-11-09

## 2019-11-09 RX ORDER — NICOTINE POLACRILEX 4 MG
15-30 LOZENGE BUCCAL
Status: DISCONTINUED | OUTPATIENT
Start: 2019-11-09 | End: 2019-11-09

## 2019-11-09 RX ADMIN — APIXABAN 2.5 MG: 2.5 TABLET, FILM COATED ORAL at 20:04

## 2019-11-09 RX ADMIN — PANTOPRAZOLE SODIUM 40 MG: 40 TABLET, DELAYED RELEASE ORAL at 17:00

## 2019-11-09 RX ADMIN — CLONIDINE HYDROCHLORIDE 0.3 MG: 0.1 TABLET ORAL at 08:42

## 2019-11-09 RX ADMIN — PANTOPRAZOLE SODIUM 40 MG: 40 TABLET, DELAYED RELEASE ORAL at 05:08

## 2019-11-09 RX ADMIN — INSULIN GLARGINE 12 UNITS: 100 INJECTION, SOLUTION SUBCUTANEOUS at 08:42

## 2019-11-09 RX ADMIN — THERA TABS 1 TABLET: TAB at 08:42

## 2019-11-09 RX ADMIN — VERAPAMIL HYDROCHLORIDE 120 MG: 120 TABLET, FILM COATED, EXTENDED RELEASE ORAL at 20:04

## 2019-11-09 RX ADMIN — INSULIN ASPART 3 UNITS: 100 INJECTION, SOLUTION INTRAVENOUS; SUBCUTANEOUS at 08:43

## 2019-11-09 RX ADMIN — CLONIDINE HYDROCHLORIDE 0.3 MG: 0.1 TABLET ORAL at 20:04

## 2019-11-09 RX ADMIN — FOLIC ACID 1 MG: 1 TABLET ORAL at 08:42

## 2019-11-09 RX ADMIN — SODIUM CHLORIDE 60 ML: 9 INJECTION, SOLUTION INTRAVENOUS at 16:35

## 2019-11-09 RX ADMIN — INSULIN GLARGINE 15 UNITS: 100 INJECTION, SOLUTION SUBCUTANEOUS at 20:04

## 2019-11-09 RX ADMIN — METOPROLOL TARTRATE 25 MG: 25 TABLET ORAL at 08:42

## 2019-11-09 RX ADMIN — APIXABAN 2.5 MG: 2.5 TABLET, FILM COATED ORAL at 08:42

## 2019-11-09 RX ADMIN — Medication 100 MG: at 08:42

## 2019-11-09 RX ADMIN — METOPROLOL TARTRATE 25 MG: 25 TABLET ORAL at 20:04

## 2019-11-09 RX ADMIN — IOPAMIDOL 100 ML: 755 INJECTION, SOLUTION INTRAVENOUS at 16:35

## 2019-11-09 RX ADMIN — ACETAMINOPHEN 650 MG: 325 TABLET, FILM COATED ORAL at 21:50

## 2019-11-09 RX ADMIN — FUROSEMIDE 20 MG: 10 INJECTION, SOLUTION INTRAVENOUS at 17:00

## 2019-11-09 ASSESSMENT — ACTIVITIES OF DAILY LIVING (ADL)
ADLS_ACUITY_SCORE: 15

## 2019-11-09 ASSESSMENT — MIFFLIN-ST. JEOR: SCORE: 1702.25

## 2019-11-09 NOTE — CONSULTS
[facility] Inpatient Consultation Note    Tc Garcia MRN# [id number]   Age: 79 year old YOB: 1940     Date of Admission:  10/30/2019    Reason for consult: hematuria       Requesting physician: Ashtyn Hurtado MD                History of Present Illness:   79M with fall on 10/30/19; sustained injuries including fractures left ribs, left hemothorax, T2 fracture, C3 spinous process fracture.    Pt reports hematuria immediately after fall, improvement over the next few days and then return of hematuria yesterday.  Endorses left flank pain.  No h/o stones or UTIs.  Denies history of voiding trouble.    Reports h/o gross hematuria and negative evaluation a few years ago including cystoscopy.    Hgb 12.2--> 10.4-->8.6-->10.1-->9.0    No abdominal CT since admission.      Renal unit(s)/s 10/31/19:  No hydro, + small right renal cyst    UA on admission: >182 RBC, 2 WBC, -Nit  UA 11/7/19: >182 RBC, 64 WBC, -Nit          Past Medical History:     Past Medical History:   Diagnosis Date     Diabetic PROTEINURIA 2003     Mixed hyperlipidemia 2003     Personal history of colonic polyps 1972    gets colonoscopy every five years, due in 2006     Rosacea 1989     Type II or unspecified type diabetes mellitus without mention of complication, not stated as uncontrolled 1999     Unspecified essential hypertension 1994             Past Surgical History:     Past Surgical History:   Procedure Laterality Date     HC REMOVE TONSILS/ADENOIDS,<10 Y/O      T & A <11y.o.             Social History:     Social History     Socioeconomic History     Marital status:      Spouse name: Lianna     Number of children: 4     Years of education: 16     Highest education level: Not on file   Occupational History     Occupation: [employer]     Employer: [employer]    Social Needs     Financial resource strain: Not on file     Food insecurity:     Worry: Not on file     Inability: Not on file      Transportation needs:     Medical: Not on file     Non-medical: Not on file   Tobacco Use     Smoking status: Former Smoker     Packs/day: 0.20     Years: 40.00     Pack years: 8.00     Types: Cigarettes     Last attempt to quit: 2008     Years since quittin.8     Smokeless tobacco: Never Used     Tobacco comment: occasional   Substance and Sexual Activity     Alcohol use: Yes     Alcohol/week: 35.0 standard drinks     Comment: occasional     Drug use: Not on file     Sexual activity: Not on file   Lifestyle     Physical activity:     Days per week: Not on file     Minutes per session: Not on file     Stress: Not on file   Relationships     Social connections:     Talks on phone: Not on file     Gets together: Not on file     Attends Buddhism service: Not on file     Active member of club or organization: Not on file     Attends meetings of clubs or organizations: Not on file     Relationship status: Not on file     Intimate partner violence:     Fear of current or ex partner: Not on file     Emotionally abused: Not on file     Physically abused: Not on file     Forced sexual activity: Not on file   Other Topics Concern     Not on file   Social History Narrative     Not on file             Family History:     Family History   Problem Relation Age of Onset     Family History Negative Mother          age 71     Family History Negative Father          age 70     Diabetes Brother         alive age 77     Diabetes Brother         alive age 76     Family History Negative Brother              Family History Negative Brother              Diabetes Sister         alive age76     Family History Negative Sister          age 86     Heart Disease Sister              Family History Negative Sister              Family History Negative Sister              Diabetes Sister      Diabetes Sister      Diabetes Brother      Diabetes Brother               Allergies:  "    Allergies   Allergen Reactions     No Known Drug Allergies              Review of Systems:   10 point ROS obtained and negative except as per HPI.     Examination:  BP (!) 170/93 (BP Location: Right arm)   Pulse 80   Temp 98  F (36.7  C) (Oral)   Resp 18   Ht 1.778 m (5' 10\")   Wt 98.6 kg (217 lb 6 oz)   SpO2 98%   BMI 31.19 kg/m    General: Alert and oriented, no distress  HEENT: Face symmetric, mucous membranes moist and pink  Eyes: No scleral icterus  Neck: Symmetric  Chest wall: Symmetric  Respiratory: Breathing unlabored, no audible wheezing  Cardiac: Extremities warm and well perfused  Abdomen: soft, non tender, non distended; no rebound, guarding or peritoneal signs  Back: large left flank, back and thic hematoma  Neuro:Grossly non focal  Pysch: Normal mood and affect  Skin: No evident rashes or lesions            Data:     Lab Results   Component Value Date    WBC 14.4 11/08/2019     Lab Results   Component Value Date    HGB 9.0 11/08/2019     Creatinine   Date Value Ref Range Status   11/08/2019 1.58 (H) 0.66 - 1.25 mg/dL Final       Impression:  79M with gross hematuria after substantial left flank trauma.    Needs CT Urogram to eval for renal trauma given gross hematuria and mechanism of injury/associated rib fractures.  Cr elevated after admission but normalizing.      Plan:   - CT Urogram today if Cr today continuing to improve   - Discussed with Dr Ella Hinojosa MD  [facility] ([facility])  [phone]       "

## 2019-11-09 NOTE — PLAN OF CARE
Vs stable, room air,  bradycardiac at times.Tele afib. A/Ox 4 but forgetful, pt did not sleep very well. Pain controlled with tramadol. Up with one assist. Mod.carb diet. Blood sugars elevated, pt continues to request food, education given. BS active. Flatus +. Voiding WDL, no hematuria noted but urine is very dark. LS wheezy on left, diminished on the right. Extensive bruising to left side. Possible discharge to TCU today.

## 2019-11-09 NOTE — PROGRESS NOTES
[facility]    Cardiology Progress Note     Assessment & Plan   Tc Garcia is a 79 year old male who was admitted on 10/30/2019.     1. Mechanical fall with multiple rib fractures and spinal fractures, non-operative management  2. Atrial fibrillation on anticoagulation  3. Episode of sinus bradycardia with junctional escape  4. Non-sustained VT  5. Hypertension  6. Volume overload with fluid resuscitation  7. Diabetes mellitus type 2    Overall he is doing very well today. Heart rates under good control. No bradycardic episodes or pauses. Plan to continue metoprolol tartrate 25 mg BID and Verapamil 120 mg SR daily as rates are well controlled on this. He continues on Eliquis for anticoagulation.    On exam he is severely volume overloaded with lower extremity edema and is 9 liters up for this admission. I would recommend reinitiating Lasix and will likely require some ongoing diuresis after dismissal to get him back to euvolemic state.    Cardiology will continue to follow along. Please page with any questions or concerns.     Preston Olivas MD    Interval History   No overnight events. Heart rates under good control. Severe lower extremity edema. No shortness of breath.     Physical Exam   Temp: 98.2  F (36.8  C) Temp src: Oral BP: 121/74 Pulse: 80 Heart Rate: 77 Resp: 18 SpO2: 96 % O2 Device: None (Room air)    Vitals:    11/07/19 0600 11/08/19 0638 11/09/19 0700   Weight: 98.2 kg (216 lb 7.9 oz) 98.9 kg (218 lb 0.6 oz) 98.6 kg (217 lb 6 oz)     Vital Signs with Ranges  Temp:  [98  F (36.7  C)-98.3  F (36.8  C)] 98.2  F (36.8  C)  Pulse:  [] 80  Heart Rate:  [77-98] 77  Resp:  [18] 18  BP: (110-176)/(59-95) 121/74  SpO2:  [95 %-98 %] 96 %  I/O last 3 completed shifts:  In: 200 [P.O.:200]  Out: 1425 [Urine:1425]    Constitutional: No apparent distress.   Eyes: No xanthelasma or conjunctivitis  Respiratory: Clear to auscultation bilaterally. No crackles or wheezes.  Cardiovascular:  Irregularly-irregular rhythm with a normal rate. No murmurs appreciated.    Extremities: 3+ lower extremity edema.   Neurologic: Moving all extremities. No facial assymmetry.  Psychiatric: Answers questions appropriately.     Medications       sodium chloride 0.9%  500 mL Intravenous Once     apixaban ANTICOAGULANT  2.5 mg Oral BID     cloNIDine  0.3 mg Oral BID     docusate sodium  100 mg Oral BID     folic acid  1 mg Oral Daily     insulin aspart  1-10 Units Subcutaneous TID AC     insulin aspart  1-7 Units Subcutaneous At Bedtime     insulin glargine  15 Units Subcutaneous BID     iopamidol  100 mL Intravenous Once     metoprolol tartrate  25 mg Oral BID     multivitamin, therapeutic  1 tablet Oral Daily     pantoprazole  40 mg Oral BID AC     sodium chloride 0.9 %  60 mL Intravenous Once     sodium chloride (PF)  3 mL Intracatheter Q8H     sodium chloride (PF)  3 mL Intracatheter Q8H     verapamil ER  120 mg Oral At Bedtime     vitamin B1  100 mg Oral Daily       Data   Results for orders placed or performed during the hospital encounter of 10/30/19 (from the past 24 hour(s))   Glucose by meter   Result Value Ref Range    Glucose 389 (H) 70 - 99 mg/dL   Glucose by meter   Result Value Ref Range    Glucose 373 (H) 70 - 99 mg/dL   Glucose by meter   Result Value Ref Range    Glucose 290 (H) 70 - 99 mg/dL   Glucose by meter   Result Value Ref Range    Glucose 255 (H) 70 - 99 mg/dL   Comprehensive metabolic panel   Result Value Ref Range    Sodium 137 133 - 144 mmol/L    Potassium 4.0 3.4 - 5.3 mmol/L    Chloride 103 94 - 109 mmol/L    Carbon Dioxide 29 20 - 32 mmol/L    Anion Gap 5 3 - 14 mmol/L    Glucose 405 (H) 70 - 99 mg/dL    Urea Nitrogen 26 7 - 30 mg/dL    Creatinine 1.50 (H) 0.66 - 1.25 mg/dL    GFR Estimate 43 (L) >60 mL/min/[1.73_m2]    GFR Estimate If Black 50 (L) >60 mL/min/[1.73_m2]    Calcium 8.1 (L) 8.5 - 10.1 mg/dL    Bilirubin Total 1.5 (H) 0.2 - 1.3 mg/dL    Albumin 2.4 (L) 3.4 - 5.0 g/dL     Protein Total 4.5 (L) 6.8 - 8.8 g/dL    Alkaline Phosphatase 105 40 - 150 U/L     (H) 0 - 70 U/L    AST 53 (H) 0 - 45 U/L   Glucose by meter   Result Value Ref Range    Glucose 375 (H) 70 - 99 mg/dL   Glucose by meter   Result Value Ref Range    Glucose 348 (H) 70 - 99 mg/dL

## 2019-11-09 NOTE — PLAN OF CARE
PT- Attempted to see pt this AM, pt stated that he has been up and walking in the hallways. Pt declined therapy at this time due to wanting to rest.

## 2019-11-09 NOTE — PLAN OF CARE
2529-3830: Pt a/ox4. Hypertensive. Tele afib CVR. Reports some breathing discomfort. Dyspneic with activity. Up with assist x1. IS performed. Bedtime glucose 373.

## 2019-11-09 NOTE — PLAN OF CARE
Discharge Planner SLP   Patient plan for discharge: [facility]  Current status: Swallow Tx was provided this am.  Pt reports good tolerance of po intake.  Pt demonstrated increased oral phase duration and a mild swallow delay during trials of soft solids and thin liquids.  No immediate signs of aspiration noted after swallows.  Slight throat clear/cough was observed in conversation.  Overall, improved tolerance of soft solids noted.    Recommend a diet upgrade to dysphagia diet level 3 textures and thin liquids with reminders to use safe swallow strategies: sit up at 90 degrees, remain up for 30 minutes after eating, small bites/sips, no straw, chew carefully, alternate textures.      Will continue per plan.   Barriers to return to prior living situation: Deconditioning; dysphagia  Recommendations for discharge: TCU  Rationale for recommendations: SLP follow up at TCU for strategy training and trials of regular textures as indicated.        Entered by: Delia Choi 11/09/2019 10:36 AM

## 2019-11-09 NOTE — PROGRESS NOTES
SW:   D: SW received update that patient has yet to be seen by Urology and labs still needed. Patient is not ready for discharge today, plan for tomorrow in the morning. BERTHA spoke to [facility] with update on discharge date, [facility] confirmed they will hold bed until 11/10. BERTHA spoke to [facility] transport and rescheduled transportation to 1030.  P: Will continue to follow.      JERE Pérez

## 2019-11-09 NOTE — PROGRESS NOTES
A&Ox4. Pleasant. Denies pain. A-1. Ambulated in the halls. Steady on feet. Up in chair for most part of the day and evening. BLE elevated due to the edema. LFTs and WBC trending down. BNP and trops elevated. Denies CP. Providers aware. Seen by EP. EP adjusted some medications. Did receive IV lasix x 2. CXR and US done. CXR positive left plural effusion and some infiltrates. Started IV Rocephin. Tele: a-fib with RVR. No fever. BP slightly elevated. Tachy. Off oxygen. SPO2 > 95% on RA.

## 2019-11-09 NOTE — PROGRESS NOTES
SW:  D: SW received [facility] regarding [facility]'s discharge - [facility] is not able to hold bed for patient if he is not ready for discharge on 11/10. Also, another insurance prior auth will be needed since the prior auth was dated for 11/07. SW followed up with [facility] confirmed that if patient discharges on 11/10 they will not need another prior auth but if he is not ready for discharge [facility] will have to give the bed hold. BERTHA updated RN.  P: Will continue to follow.        JERE Pérez

## 2019-11-09 NOTE — PROGRESS NOTES
[facility]    Medicine Progress Note - Hospitalist Service        Date of Admission:  10/30/2019  3:14 PM    Assessment & Plan:   Tc Garcia is a 79 year old male with a history of atrial fibrillation, DM type II, HTN admitted on 10/30/2019 after a fall down the stairs sustaining a C3 spinous process fracture, fracture of T2, multiple left sided rib fractures and moderate left hemothorax.  Admitted on 10/30/2019     Status post mechanical fall  Multiple left sided rib fractures   Moderate left hemothorax   C3 spinous process fracture  Fracture of T2  Extensive ecchymosis of the left flank  -Sustained after a fall down the stairs. No dizziness or LOC. No chest pain or palpitations. -Numerous fractures (L rib fractures C3 spinous process fx, T2 acute fracure) seen on CT scans of the cervical spine and chest.   -CT scan head did not show any evidence of bleeding.    -In the ED had chest tube for left hemothorax. Was followed by trauma surgery, CT surgery. -Patient self pulled out chest tube overnight 11/2-3.  Was stable.  Repeat imaging no hematoma at previous chest tube site.  -Per CT surgery some movement detected at left rib fracture site, some possible mild flailing. No surgical intervention recommended unless patient decompensates significantly from a resp standpoint.   -Repeat chest x-ray for follow-up of pleural effusion, x-ray left ribs for follow-up of rib fractures  on 11/8 with small to moderate left pleural effusion with associated atelectasis and/or infiltrate.  Minimal right pleural fluid.  -Aggressive incentive spirometry.  Encourage ambulation.  -PT, OT following, plan for transition to TCU.     Atrial fibrillation on coagulation.  Sinus bradycardia with junctional escape at 11/2.  Nonsustained V. tach 11/7.  Hypertension with elevated blood pressures likely secondary to pain.  -Normally anticoagulated on Eliquis.    -EKG in the ED showed sinus bradycardia. 11/2-11/3 harrison to  30-40's  -Echocardiogram with estimated EF at 55 to 60%.  Moderate to severely dilated left atrium, moderate to mild moderately dilated right atrium.  Right ventricular systolic function mildly reduced.  Mild to moderate TR.  Large left pleural effusion.  -On 11/6 patient went in to A- flutter but has been rate controlled, 11/7 elevated heart rate in 90s to 100 and late in the day had 14 beat run of V. tach.  Patient did complain of chest pain during that episode.   -EP consulted given arrhythmias, recommend conservative medical treatment for now.  Mild troponin leak likely secondary to tachycardia from A. fib.  Continue with Eliquis and beta-blocker.  -Eliquis restarted 11/6 after okay by CT surgery.   -Continue PTA verapamil (11/4)  -Started on metoprolol 12.5 mg [11/7] twice daily, uptitrated to 25 mg twice a day by EP on 11/8  -Continue PTA clonidine (11/6)     Volume overload from iatrogenic fluid resuscitation.  -Weight gain from 205 > 216 pounds.  proBNP 3253.  -Diuresis with 20 mg IV Lasix x1 (11/7) -repeat 20 mg IV Lasix for 2 doses 11/8    -Weight still up with diuresis?  Accuracy  -Strict I's and O's, daily weights.   -Hold off on further diuresis at this time, reassess in the morning.     Acute renal failure, presumed ATN. 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  -Creatinine on admission 10/30 at 1.09.  Peaked to 3.51.  -PTA on losartan-hydrochlorothiazide, lisinopril (ACE I and ARB).   -Some hypotension to 80-90's systolic during stay as well as bradycardia. ACEI/ ARB on board.  -Renal US with small R renal cyst, no obstruction.   -Creatinine improved to 1.50.    -Holding ACE I and ARB, would only take either ACE I or ARB in future  -Avoid nephrotoxins  -Nephrology signed off 11/8.  Appreciate assistance.     Acute liver injury likely ischemic, alcohol use.  -Some hypotension to 80-90's systolic during stay as well as bradycardia.  -On 11/7 - , .  Bilirubin 1.8.  -Ultrasound right  upper quadrant without acute findings  -LFTs trending down, monitor intermittently     Leukocytosis with possible aspiration pneumonia.  Lactic acidosis likely from fall.  -Admit WBC at 25.6-> 14.4  -Procalcitonin 4.69 > 0.18.  Lactic acid 4.1 >1.0  -Blood cultures with NGTD. MRSA swabs negative  -CXR 11/4 relatively unremarkable, ? Small infiltrate L base  -UA nitrite negative, leukocyte esterase trace.1 WBC.  -Was on empiric zosyn 10/31 and completed a 7 day course  -Check CBC in the morning    Acute anemia from blood loss, component of dilutional.  -Presented with hemoglobin of 12.2, dropped to 8.2.  -Hemoglobin stable at 9.0 on 11/8, recheck in the morning.    Possible mild cognitive impairment  Delirium. Resolved   -Intermittent episode of pulling out IVs, pulled out chest tube 11/3.  This morning awake and able to answer simple commands. Per family report history of heavy alcohol use.  -Will need cognitive evaluation as outpatient. Discussed no driving until cognitive eval.  -Avoid narcotics as able to.     Chronic alcohol abuse.  Has long standing history of heavy alcohol use per wife and daughter report.  Lately has been drinking few times a week, can drink as much as half a bottle each time.  Family unsure when last drink was.  Unsure if contributed to fall. No signs of withdrawal during hospitalization.  We will need to consider alcohol cessation.  Continue thiamine multivitamin and folic acid supplements.  Did discuss with family to seek out rehab assistance once transition from TCU     Hematuria  UA from admission with large blood, RBCs.  UA 11/7 showing possible moderate leukocyte esterase with 64 WBCs, 182 RBCs.  Urine cultures 11/7 negative. Completed course of IV Zosyn [11/6].    -Urology consulted, plan for CT urogram today.     DM type II hemoglobin A1c 7.7.  PTA on Metformin 500 mg BID, Actos 30 mg and insulin pump.   PTA insulin dosing ~50-60 Units daily through pump.   Holding PTA meds and  "insulin pump   Was on Lantus 20 units BID (11/3 ) switched to insulin Lantus 12 units twice daily (11/7) for hypoglycemia.  -Blood sugar poorly controlled today, increase Lantus to 15 units twice a day.  -Increase to medium intensity sliding scale.     Physical deconditioning from acute illness, senile fragility.  -PT, OT recommending TCU.        Diet: Snacks/Supplements Adult: Other; chocolate Boost Glucose Control with meals  (RD); With Meals  Snacks/Supplements Adult: Other; Lunch: HT berry smoothie,  Dinner: str/banana smoothie (RD); With Meals  Combination Diet [id number] Calories: Moderate Consistent CHO (4-6 CHO units/meal); Dysphagia Diet Level 3: Advanced; Thin Liquids (water, ice chips, juice, milk gelatin, ice cream, etc); No Straws     DVT Prophylaxis: Pneumatic Compression Devices   Pruett Catheter: not present  Code Status: Full Code     Disposition Plan    Expected discharge: Tomorrow, recommended to transitional care unit .   Entered: Jas Jaramillo MD 11/09/2019, 11:39 AM        The patient's care was discussed with the Bedside Nurse.  And patient    Jas Jaramillo MD  Hospitalist Service  [facility]    ______________________________________________________________________    Interval History   Overall feels much better.  Pain adequately controlled.  Denies dyspnea.  Not on oxygen.  No nausea or vomiting.  LFTs trending down.    Data reviewed today: I reviewed all medications, new labs and imaging results over the last 24 hours. I personally reviewed no images or EKG's today.    Physical Exam   Vital signs:  Temp: 98.2  F (36.8  C) Temp src: Oral BP: 121/74 Pulse: 80 Heart Rate: 77 Resp: 18 SpO2: 96 % O2 Device: None (Room air) Oxygen Delivery: 1 LPM Height: 177.8 cm (5' 10\") Weight: 98.6 kg (217 lb 6 oz)  Estimated body mass index is 31.19 kg/m  as calculated from the following:    Height as of this encounter: 1.778 m (5' 10\").    Weight as of this encounter: 98.6 kg (217 lb 6 " oz).      Wt Readings from Last 2 Encounters:   11/09/19 98.6 kg (217 lb 6 oz)   08/10/16 92.5 kg (204 lb)       Gen: AAOX3, forgetful, NAD  HEENT: Supple neck, moist oral mucosa, no pallor  Resp: Diminished air entry at both the bases, normal effort of breathing  CVS: RRR, no murmur  Abd/GI: Soft, non-tender. BS- normoactive.    Skin: Ecchymosis over left flank   MSK: No joint deformities, no pedal edema  Neuro- CN- intact. No focal deficits.       Data   Recent Labs   Lab 11/09/19  1054 11/08/19  1525 11/08/19  1120 11/08/19  0658 11/07/19  1222 11/07/19  0714  11/05/19  0721   WBC  --   --   --  14.4*  --  20.9*  --  22.9*   HGB  --   --   --  9.0*  --  10.1*  --  10.0*   MCV  --   --   --  93  --  94  --  89   PLT  --   --   --  250  --  330  --  288   INR  --   --   --   --  1.44*  --   --   --      --   --  144  --  147*   < > 146*   POTASSIUM 4.0  --   --  3.8  --  4.2   < > 2.9*   CHLORIDE 103  --   --  111*  --  114*   < > 115*   CO2 29  --   --  30  --  30   < > 24   BUN 26  --   --  25  --  28   < > 49*   CR 1.50*  --   --  1.58*  --  1.71*   < > 2.30*   ANIONGAP 5  --   --  3  --  3   < > 7   LLOYD 8.1*  --   --  8.3*  --  8.6   < > 8.1*   *  --   --  100*  --  78   < > 158*   ALBUMIN 2.4*  --   --  2.4*  --  2.7*   < >  --    PROTTOTAL 4.5*  --   --  4.6*  --  5.2*   < >  --    BILITOTAL 1.5*  --   --  1.5*  --  1.8*   < >  --    ALKPHOS 105  --   --  85  --  94   < >  --    *  --   --  365*  --  568*   < >  --    AST 53*  --   --  88*  --  198*   < >  --    TROPI  --  0.210* 0.266* 0.288*  --   --   --   --     < > = values in this interval not displayed.       No results found for this or any previous visit (from the past 24 hour(s)).  Medications       sodium chloride 0.9%  500 mL Intravenous Once     apixaban ANTICOAGULANT  2.5 mg Oral BID     cloNIDine  0.3 mg Oral BID     docusate sodium  100 mg Oral BID     folic acid  1 mg Oral Daily     insulin aspart  1-10 Units Subcutaneous  TID AC     insulin aspart  1-7 Units Subcutaneous At Bedtime     insulin glargine  12 Units Subcutaneous BID     metoprolol tartrate  25 mg Oral BID     multivitamin, therapeutic  1 tablet Oral Daily     pantoprazole  40 mg Oral BID AC     sodium chloride (PF)  3 mL Intracatheter Q8H     sodium chloride (PF)  3 mL Intracatheter Q8H     verapamil ER  120 mg Oral At Bedtime     vitamin B1  100 mg Oral Daily

## 2019-11-10 ENCOUNTER — MEDICAL CORRESPONDENCE (OUTPATIENT)
Dept: HEALTH INFORMATION MANAGEMENT | Facility: CLINIC | Age: 79
End: 2019-11-10

## 2019-11-10 VITALS
HEART RATE: 77 BPM | WEIGHT: 219.8 LBS | SYSTOLIC BLOOD PRESSURE: 144 MMHG | RESPIRATION RATE: 18 BRPM | OXYGEN SATURATION: 98 % | HEIGHT: 70 IN | BODY MASS INDEX: 31.47 KG/M2 | TEMPERATURE: 98.7 F | DIASTOLIC BLOOD PRESSURE: 84 MMHG

## 2019-11-10 LAB
ALBUMIN SERPL-MCNC: 2.6 G/DL (ref 3.4–5)
ALP SERPL-CCNC: 119 U/L (ref 40–150)
ALT SERPL W P-5'-P-CCNC: 229 U/L (ref 0–70)
ANION GAP SERPL CALCULATED.3IONS-SCNC: 6 MMOL/L (ref 3–14)
AST SERPL W P-5'-P-CCNC: 49 U/L (ref 0–45)
BILIRUB DIRECT SERPL-MCNC: 0.6 MG/DL (ref 0–0.2)
BILIRUB SERPL-MCNC: 1.2 MG/DL (ref 0.2–1.3)
BUN SERPL-MCNC: 20 MG/DL (ref 7–30)
CALCIUM SERPL-MCNC: 8.4 MG/DL (ref 8.5–10.1)
CHLORIDE SERPL-SCNC: 103 MMOL/L (ref 94–109)
CO2 SERPL-SCNC: 28 MMOL/L (ref 20–32)
CREAT SERPL-MCNC: 1.36 MG/DL (ref 0.66–1.25)
ERYTHROCYTE [DISTWIDTH] IN BLOOD BY AUTOMATED COUNT: 19.9 % (ref 10–15)
GFR SERPL CREATININE-BSD FRML MDRD: 49 ML/MIN/{1.73_M2}
GLUCOSE BLDC GLUCOMTR-MCNC: 172 MG/DL (ref 70–99)
GLUCOSE BLDC GLUCOMTR-MCNC: 213 MG/DL (ref 70–99)
GLUCOSE BLDC GLUCOMTR-MCNC: 290 MG/DL (ref 70–99)
GLUCOSE SERPL-MCNC: 259 MG/DL (ref 70–99)
HCT VFR BLD AUTO: 30.8 % (ref 40–53)
HGB BLD-MCNC: 9.7 G/DL (ref 13.3–17.7)
MCH RBC QN AUTO: 29.2 PG (ref 26.5–33)
MCHC RBC AUTO-ENTMCNC: 31.5 G/DL (ref 31.5–36.5)
MCV RBC AUTO: 93 FL (ref 78–100)
PLATELET # BLD AUTO: 300 10E9/L (ref 150–450)
POTASSIUM SERPL-SCNC: 3.4 MMOL/L (ref 3.4–5.3)
PROT SERPL-MCNC: 5.1 G/DL (ref 6.8–8.8)
RBC # BLD AUTO: 3.32 10E12/L (ref 4.4–5.9)
SODIUM SERPL-SCNC: 137 MMOL/L (ref 133–144)
WBC # BLD AUTO: 13.5 10E9/L (ref 4–11)

## 2019-11-10 PROCEDURE — 00000146 ZZHCL STATISTIC GLUCOSE BY METER IP

## 2019-11-10 PROCEDURE — 80076 HEPATIC FUNCTION PANEL: CPT | Performed by: INTERNAL MEDICINE

## 2019-11-10 PROCEDURE — 25000132 ZZH RX MED GY IP 250 OP 250 PS 637: Performed by: INTERNAL MEDICINE

## 2019-11-10 PROCEDURE — 36415 COLL VENOUS BLD VENIPUNCTURE: CPT | Performed by: INTERNAL MEDICINE

## 2019-11-10 PROCEDURE — 25000131 ZZH RX MED GY IP 250 OP 636 PS 637: Performed by: INTERNAL MEDICINE

## 2019-11-10 PROCEDURE — 80048 BASIC METABOLIC PNL TOTAL CA: CPT | Performed by: INTERNAL MEDICINE

## 2019-11-10 PROCEDURE — 25000132 ZZH RX MED GY IP 250 OP 250 PS 637: Performed by: HOSPITALIST

## 2019-11-10 PROCEDURE — 85027 COMPLETE CBC AUTOMATED: CPT | Performed by: INTERNAL MEDICINE

## 2019-11-10 PROCEDURE — 99231 SBSQ HOSP IP/OBS SF/LOW 25: CPT | Performed by: INTERNAL MEDICINE

## 2019-11-10 PROCEDURE — 99239 HOSP IP/OBS DSCHRG MGMT >30: CPT | Performed by: INTERNAL MEDICINE

## 2019-11-10 PROCEDURE — 92526 ORAL FUNCTION THERAPY: CPT | Mod: GN | Performed by: SPEECH-LANGUAGE PATHOLOGIST

## 2019-11-10 RX ORDER — PANTOPRAZOLE SODIUM 20 MG/1
40 TABLET, DELAYED RELEASE ORAL DAILY
DISCHARGE
Start: 2019-11-10 | End: 2019-11-10

## 2019-11-10 RX ORDER — METOPROLOL TARTRATE 25 MG/1
25 TABLET, FILM COATED ORAL 2 TIMES DAILY
Status: DISCONTINUED | OUTPATIENT
Start: 2019-11-10 | End: 2019-11-10 | Stop reason: HOSPADM

## 2019-11-10 RX ORDER — VERAPAMIL HYDROCHLORIDE 120 MG/1
120 TABLET, FILM COATED, EXTENDED RELEASE ORAL AT BEDTIME
DISCHARGE
Start: 2019-11-10 | End: 2020-01-07

## 2019-11-10 RX ORDER — FUROSEMIDE 40 MG
40 TABLET ORAL DAILY
Status: DISCONTINUED | OUTPATIENT
Start: 2019-11-10 | End: 2019-11-10 | Stop reason: HOSPADM

## 2019-11-10 RX ORDER — METOPROLOL TARTRATE 25 MG/1
25 TABLET, FILM COATED ORAL 2 TIMES DAILY
DISCHARGE
Start: 2019-11-10 | End: 2019-11-21 | Stop reason: DRUGHIGH

## 2019-11-10 RX ORDER — LANOLIN ALCOHOL/MO/W.PET/CERES
3 CREAM (GRAM) TOPICAL
Status: DISCONTINUED | OUTPATIENT
Start: 2019-11-10 | End: 2019-11-10 | Stop reason: HOSPADM

## 2019-11-10 RX ORDER — FUROSEMIDE 40 MG
40 TABLET ORAL DAILY
DISCHARGE
Start: 2019-11-10 | End: 2019-11-10

## 2019-11-10 RX ORDER — PANTOPRAZOLE SODIUM 20 MG/1
20 TABLET, DELAYED RELEASE ORAL DAILY
Status: ON HOLD | DISCHARGE
Start: 2019-11-10 | End: 2019-12-30

## 2019-11-10 RX ORDER — FUROSEMIDE 40 MG
40 TABLET ORAL DAILY
DISCHARGE
Start: 2019-11-10 | End: 2019-11-21 | Stop reason: DRUGHIGH

## 2019-11-10 RX ORDER — LANOLIN ALCOHOL/MO/W.PET/CERES
3 CREAM (GRAM) TOPICAL
Status: ON HOLD | DISCHARGE
Start: 2019-11-10 | End: 2019-12-30

## 2019-11-10 RX ADMIN — APIXABAN 2.5 MG: 2.5 TABLET, FILM COATED ORAL at 08:09

## 2019-11-10 RX ADMIN — CLONIDINE HYDROCHLORIDE 0.3 MG: 0.1 TABLET ORAL at 08:09

## 2019-11-10 RX ADMIN — PANTOPRAZOLE SODIUM 40 MG: 40 TABLET, DELAYED RELEASE ORAL at 05:36

## 2019-11-10 RX ADMIN — Medication 100 MG: at 08:09

## 2019-11-10 RX ADMIN — INSULIN GLARGINE 15 UNITS: 100 INJECTION, SOLUTION SUBCUTANEOUS at 08:14

## 2019-11-10 RX ADMIN — MELATONIN 3 MG: 3 TAB ORAL at 00:33

## 2019-11-10 RX ADMIN — FOLIC ACID 1 MG: 1 TABLET ORAL at 08:09

## 2019-11-10 RX ADMIN — THERA TABS 1 TABLET: TAB at 08:09

## 2019-11-10 RX ADMIN — METOPROLOL TARTRATE 25 MG: 25 TABLET ORAL at 08:09

## 2019-11-10 RX ADMIN — FUROSEMIDE 40 MG: 40 TABLET ORAL at 10:02

## 2019-11-10 ASSESSMENT — ACTIVITIES OF DAILY LIVING (ADL)
ADLS_ACUITY_SCORE: 15

## 2019-11-10 ASSESSMENT — MIFFLIN-ST. JEOR
SCORE: 1716.25
SCORE: 1713.26

## 2019-11-10 NOTE — PLAN OF CARE
Vs stable, room air,  bradycardia at times .Tele afib. A/Ox 4 but forgetful. Denies pain but did not sleep well, melatonin given. Up with one assist. Mod.carb diet. Blood sugars monitored. BS active. Flatus +. Voiding WDL. LS diminished. Possible discharge today to TCU.

## 2019-11-10 NOTE — PROGRESS NOTES
SW:  D: SW contacted [facility] transport - ride rescheduled to 1230.  P: Will continue to follow.      JERE Pérez

## 2019-11-10 NOTE — PLAN OF CARE
A&Ox4 but forgetful. VSS on RA. Up SBA. Denies pain. Blood sugar checks, insulin given per orders. Pt requesting food high in sugars, educated on importance of diabetic control. Voiding, lasix given. Possible discharge to TCU tomorrow depending on labs and medically stability.

## 2019-11-10 NOTE — PLAN OF CARE
A&Ox4. VSS ex htn. Discharge instructions given to pt and faxed to TCU. Questions answered. IV removed. Phone,  and belongings given back to pt.  EMS transferred pt.

## 2019-11-10 NOTE — PROGRESS NOTES
[facility]    Cardiology Progress Note     Assessment & Plan   Tc Garcia is a 79 year old male who was admitted on 10/30/2019.      1. Mechanical fall with multiple rib fractures and spinal fractures, non-operative management  2. Atrial fibrillation on anticoagulation  3. Episode of sinus bradycardia with junctional escape  4. Non-sustained VT  5. Hypertension  6. Volume overload with fluid resuscitation  7. Diabetes mellitus type 2    No overnight events.  Heart rates are under good control with atrial fibrillation.  No significant bradycardic episodes or pauses.  Plan for continuing current therapy of metoprolol tartrate 25 mg twice a day and verapamil 120 mg sustained release daily.  He will continue on Eliquis for anticoagulation.    He continues to have significant volume overload in his lower extremities.  It sounds as if he will likely be dismissing today.  Would recommend maintenance oral Lasix at time of dismissal as well as to consider lower extremity wrapping while he is recovering from all this.    Please page with any questions or concerns prior to his dismissal from the hospital.    Preston Olivas MD    Interval History   No overnight events.  Heart rates remained are under good control with average rates less than 110 bpm.  Significant lower extremity edema.  No shortness of breath.  No orthopnea or paroxysmal nocturnal dyspnea.    Physical Exam   Temp: 97.9  F (36.6  C) Temp src: Oral BP: (!) 143/75 Pulse: 83 Heart Rate: 65 Resp: 16 SpO2: 94 % O2 Device: None (Room air)    Vitals:    11/09/19 0700 11/10/19 0433 11/10/19 0624   Weight: 98.6 kg (217 lb 6 oz) 100 kg (220 lb 7.4 oz) 99.7 kg (219 lb 12.8 oz)     Vital Signs with Ranges  Temp:  [97.9  F (36.6  C)-98.6  F (37  C)] 97.9  F (36.6  C)  Pulse:  [72-90] 83  Heart Rate:  [65-77] 65  Resp:  [14-18] 16  BP: (104-184)/(59-94) 143/75  SpO2:  [94 %-96 %] 94 %  I/O last 3 completed shifts:  In: 700 [P.O.:700]  Out: 1100  [Urine:1100]    Constitutional: No apparent distress.   Eyes: No xanthelasma or conjunctivitis  Respiratory: Clear to auscultation bilaterally. No crackles or wheezes.  Cardiovascular: Irregularly-irregular rhythm with a normal rate. No murmurs appreciated.    Extremities: 3+ lower extremity edema.   Neurologic: Moving all extremities. No facial assymmetry.  Psychiatric: Answers questions appropriately.     Medications       sodium chloride 0.9%  500 mL Intravenous Once     apixaban ANTICOAGULANT  5 mg Oral BID     cloNIDine  0.3 mg Oral BID     docusate sodium  100 mg Oral BID     folic acid  1 mg Oral Daily     furosemide  40 mg Oral Daily     insulin aspart  1-10 Units Subcutaneous TID AC     insulin aspart  1-7 Units Subcutaneous At Bedtime     insulin glargine  15 Units Subcutaneous BID     metoprolol tartrate  25 mg Oral BID     multivitamin, therapeutic  1 tablet Oral Daily     pantoprazole  40 mg Oral BID AC     sodium chloride (PF)  3 mL Intracatheter Q8H     sodium chloride (PF)  3 mL Intracatheter Q8H     verapamil ER  120 mg Oral At Bedtime     vitamin B1  100 mg Oral Daily       Data   Results for orders placed or performed during the hospital encounter of 10/30/19 (from the past 24 hour(s))   Comprehensive metabolic panel   Result Value Ref Range    Sodium 137 133 - 144 mmol/L    Potassium 4.0 3.4 - 5.3 mmol/L    Chloride 103 94 - 109 mmol/L    Carbon Dioxide 29 20 - 32 mmol/L    Anion Gap 5 3 - 14 mmol/L    Glucose 405 (H) 70 - 99 mg/dL    Urea Nitrogen 26 7 - 30 mg/dL    Creatinine 1.50 (H) 0.66 - 1.25 mg/dL    GFR Estimate 43 (L) >60 mL/min/[1.73_m2]    GFR Estimate If Black 50 (L) >60 mL/min/[1.73_m2]    Calcium 8.1 (L) 8.5 - 10.1 mg/dL    Bilirubin Total 1.5 (H) 0.2 - 1.3 mg/dL    Albumin 2.4 (L) 3.4 - 5.0 g/dL    Protein Total 4.5 (L) 6.8 - 8.8 g/dL    Alkaline Phosphatase 105 40 - 150 U/L     (H) 0 - 70 U/L    AST 53 (H) 0 - 45 U/L   Glucose by meter   Result Value Ref Range    Glucose  375 (H) 70 - 99 mg/dL   Glucose by meter   Result Value Ref Range    Glucose 348 (H) 70 - 99 mg/dL   Glucose by meter   Result Value Ref Range    Glucose 244 (H) 70 - 99 mg/dL   CT Urogram wo & w Contrast    Narrative    CT UROGRAM WITHOUT AND WITH CONTRAST  11/9/2019 5:09 PM    HISTORY: Gross hematuria after trauma.  Evaluate for urinary injury.    TECHNIQUE: Scans obtained from the diaphragm through the pelvis  without and with IV contrast, 100 mL Isovue-370. Radiation dose for  this scan was reduced using automated exposure control, adjustment of  the mA and/or kV according to patient size, or iterative  reconstruction technique.    COMPARISON:  Limited abdominal ultrasound dated 11/8/2019.    FINDINGS: Moderate sized left and small size right pleural fluid  collections with adjacent compressive atelectasis in the posterior  aspects of the lung bases are noted. There are extensive coronary  artery calcifications and/or stents. The heart is mildly enlarged.  Visualized portions of the lung bases and mediastinal contents are  otherwise grossly unremarkable. No aggressive osseous lesions are  identified. There are degenerative changes in the spine. There are at  least posterior left 9th and 10th rib fractures. The posterior left  ninth rib fracture is significantly displaced by at least 1.5 bone  width. No other evidence for acute osseous fracture is seen.    Monitor devices on skin surface cause streak artifact mildly limiting  evaluation of the underlying soft tissues. The liver, gallbladder,  pancreas, spleen, bilateral adrenal glands, and bilateral kidneys  enhance normally. No evidence for acute traumatic injury is  identified. No hydronephrosis, hydroureter, ureteral calculus or right  nephrolithiasis is seen. There is a nonobstructive left intrarenal  calculus (image 37 series 3) measuring up to 0.4 cm. The urinary  bladder is grossly unremarkable other than a small indentation by  prostate gland posteriorly.  Prostate gland is enlarged.    No adenopathy, free fluid or free air is seen in the peritoneal  cavity. There is nonaneurysmal atherosclerosis. Edema is seen in the  subcutaneous adipose tissues around the pelvis.    Scattered diverticuli are seen in the colon but are most predominantly  noted in the sigmoid and descending colon. No pericolonic inflammatory  change to suggest acute diverticulitis. Appendix is normal in  appearance. Cecum is slightly flipped to the midline. Small bowel is  of normal caliber. Stomach is mostly decompressed but otherwise  unremarkable.      Impression    IMPRESSION:  1. Nonobstructive left intrarenal calculus measures up to 0.4 cm.  2. Moderate-sized left and small right pleural fluid collection with  adjacent compressive atelectasis in the lung bases. Fractures of the  left posterior 9th and 10th ribs are noted. There are also likely  fractures of other ribs which are not completely included on this  study. No other fractures.  3. No evidence for acute traumatic injury to the abdomen or pelvis is  identified. No evidence for renal, splenic, or hepatic laceration.  4. Nonaneurysmal atherosclerosis. Probable coronary artery stents  versus dense coronary artery calcifications are noted.  5. Edema in subcutaneous adipose tissues around pelvis is noted.     CEDRICK BRAR MD   Glucose by meter   Result Value Ref Range    Glucose 197 (H) 70 - 99 mg/dL   Glucose by meter   Result Value Ref Range    Glucose 172 (H) 70 - 99 mg/dL   Glucose by meter   Result Value Ref Range    Glucose 213 (H) 70 - 99 mg/dL   Basic metabolic panel   Result Value Ref Range    Sodium 137 133 - 144 mmol/L    Potassium 3.4 3.4 - 5.3 mmol/L    Chloride 103 94 - 109 mmol/L    Carbon Dioxide 28 20 - 32 mmol/L    Anion Gap 6 3 - 14 mmol/L    Glucose 259 (H) 70 - 99 mg/dL    Urea Nitrogen 20 7 - 30 mg/dL    Creatinine 1.36 (H) 0.66 - 1.25 mg/dL    GFR Estimate 49 (L) >60 mL/min/[1.73_m2]    GFR Estimate If Black 56 (L)  >60 mL/min/[1.73_m2]    Calcium 8.4 (L) 8.5 - 10.1 mg/dL   CBC with platelets   Result Value Ref Range    WBC 13.5 (H) 4.0 - 11.0 10e9/L    RBC Count 3.32 (L) 4.4 - 5.9 10e12/L    Hemoglobin 9.7 (L) 13.3 - 17.7 g/dL    Hematocrit 30.8 (L) 40.0 - 53.0 %    MCV 93 78 - 100 fl    MCH 29.2 26.5 - 33.0 pg    MCHC 31.5 31.5 - 36.5 g/dL    RDW 19.9 (H) 10.0 - 15.0 %    Platelet Count 300 150 - 450 10e9/L   Hepatic panel   Result Value Ref Range    Bilirubin Direct 0.6 (H) 0.0 - 0.2 mg/dL    Bilirubin Total 1.2 0.2 - 1.3 mg/dL    Albumin 2.6 (L) 3.4 - 5.0 g/dL    Protein Total 5.1 (L) 6.8 - 8.8 g/dL    Alkaline Phosphatase 119 40 - 150 U/L     (H) 0 - 70 U/L    AST 49 (H) 0 - 45 U/L

## 2019-11-10 NOTE — PROGRESS NOTES
Urology    CT Urogram reviewed    Appears to have large filling defect in left renal pelvis; could be either blood clot of tumor.  Given history of hematuria predates trauma and smoking history, suspicious this could be tumor.  Will need to evaluate with ureteroscopy/follow-up imaging which can be done as outpatient. Needs urien cytology sent.  Also 4mm LLP stone.     Will update AVS to arrange f/u.    Reji Hinojosa MD  8:53 AM  11/10/19      Pt seen and examined.  Ready to go home.  Denies further hematuria.    Abd soft  Hgb 9.7  Cr 1.36    79M with gross hematuria after trauma. Stable.      - f/u in clinic in 1-2 weeks with repeat CT Urogram.  If filling defect still present in left rneal pelvis will need ureteroscopy.    Discussed with hospitalist.    15 min total time, >50% care coordination    Reji Hinojosa MD  12:45 PM  11/10/19

## 2019-11-10 NOTE — PLAN OF CARE
Physical Therapy Discharge Summary    Reason for therapy discharge:    Discharged to transitional care facility.    Progress towards therapy goal(s). See goals on Care Plan in [facility] electronic health record for goal details.  Goals not met.  Barriers to achieving goals:   discharge from facility.    Therapy recommendation(s):    Continued therapy is recommended.  Rationale/Recommendations:  PT at TCU to progress mobility.

## 2019-11-10 NOTE — PLAN OF CARE
Speech Language Therapy Discharge Summary    Reason for therapy discharge:    Discharged to transitional care facility.    Progress towards therapy goal(s). See goals on Care Plan in [facility] electronic health record for goal details.  Goals partially met.  Barriers to achieving goals:   discharge from facility.    Therapy recommendation(s):    Continued therapy is recommended.  Rationale/Recommendations:  see last SLP note from 11/9 listed below.    Discharge Planner SLP   [facility]  Current status: Swallow Tx was provided this am.  Pt reports good tolerance of po intake.  Pt demonstrated increased oral phase duration and a mild swallow delay during trials of soft solids and thin liquids.  No immediate signs of aspiration noted after swallows.  Slight throat clear/cough was observed in conversation.  Overall, improved tolerance of soft solids noted.     Recommend a diet upgrade to dysphagia diet level 3 textures and thin liquids with reminders to use safe swallow strategies: sit up at 90 degrees, remain up for 30 minutes after eating, small bites/sips, no straw, chew carefully, alternate textures.       Will continue per plan.   Barriers to return to prior living situation: Deconditioning; dysphagia  Recommendations for discharge: TCU  Rationale for recommendations: SLP follow up at TCU for strategy training and trials of regular textures as indicated.

## 2019-11-10 NOTE — DISCHARGE SUMMARY
[facility]    Discharge Summary  Hospitalist    Date of Admission:  10/30/2019  Date of Discharge:  11/10/2019  Discharging Provider: Jas Jaramillo MD    Discharge Diagnoses      Status post mechanical fall  Multiple left sided rib fractures due to mechanical fall  Moderate left hemothorax   C3 spinous process fracture  Fracture of T2  Extensive ecchymosis of the left flank  Atrial fibrillation  Hypertension    Volume overload from iatrogenic fluid resuscitation.  Acute renal failure, presumed ATN. Improving   Acute liver injury likely ischemic   Possible aspiration pneumonia.  Acute anemia from blood loss, component of dilutional.  Possible mild cognitive impairment  Delirium. Resolved   Chronic alcohol abuse.  Hematuria  DM type II hemoglobin A1c 7.7.  Physical deconditioning from acute illness, senile fragility.    Hospital Course:  Tc Garcia is a 79 year old male with a history of atrial fibrillation, DM type II, HTN admitted on 10/30/2019 after a fall down the stairs sustaining a C3 spinous process fracture, fracture of T2, multiple left sided rib fractures and moderate left hemothorax.  Admitted on 10/30/2019     Status post mechanical fall  Multiple left sided rib fractures   Moderate left hemothorax   C3 spinous process fracture  Fracture of T2  Extensive ecchymosis of the left flank  -Sustained after a fall down the stairs. No dizziness or LOC. No chest pain or palpitations. -Numerous fractures (L rib fractures C3 spinous process fx, T2 acute fracure) seen on CT scans of the cervical spine and chest.   -CT scan head did not show any evidence of bleeding.    -In the ED had chest tube for left hemothorax. Was followed by trauma surgery, thoracic surgery.   -Patient self pulled out chest tube overnight 11/2-3.  Was stable.  Repeat imaging no hematoma at previous chest tube site.  -Per CT surgery some movement detected at left rib fracture site, some possible mild flailing. No surgical  intervention recommended unless patient decompensates significantly from a resp standpoint.  Discussed with Dr. Rodriguez on the day of discharge, patient had small residual left pleural effusion, he suggested continuing to manage conservatively as clinically the patient was improving.  -Aggressive incentive spirometry.   -transition to TCU.     Atrial fibrillation on coagulation.  Sinus bradycardia with junctional escape at 11/2.  Nonsustained V. tach 11/7.  Hypertension with elevated blood pressures likely secondary to pain.  -Normally anticoagulated on Eliquis.    -EKG in the ED showed sinus bradycardia. 11/2-11/3 harrison to 30-40's  -Echocardiogram with estimated EF at 55 to 60%.  Moderate to severely dilated left atrium, moderate to mild moderately dilated right atrium.  Right ventricular systolic function mildly reduced.  Mild to moderate TR.  Large left pleural effusion.  -On 11/6 patient went in to A- flutter but has been rate controlled, 11/7 elevated heart rate in 90s to 100 and late in the day had 14 beat run of V. tach.  Patient did complain of chest pain during that episode.   -EP consulted given arrhythmias, recommend conservative medical treatment for now.  Mild troponin leak likely secondary to tachycardia from A. fib.  Continue with Eliquis and beta-blocker.  -Eliquis restarted 11/6 after okay by CT surgery.   -Continue PTA verapamil (11/4)  -Started on metoprolol 12.5 mg [11/7] twice daily, uptitrated to 25 mg twice a day by EP on 11/8  -Continue PTA clonidine (11/6)  -Outpatient follow-up with cardiology     Volume overload from iatrogenic fluid resuscitation.  -Weight gain from 205 > 216 pounds.  proBNP 3253.  -Diuresis with 20 mg IV Lasix x1 (11/7) -repeat 20 mg IV Lasix for 2 doses 11/8    -Weight still up with diuresis?  Accuracy  -Strict I's and O's, daily weights.   -Continue Lasix 40 mg p.o. daily at discharge BMP in 1 week.     Acute renal failure, presumed ATN. Improving   Possible competent  of CKD stage II.  -Creatinine on admission 10/30 at 1.09.  Peaked to 3.51.  -PTA on losartan-hydrochlorothiazide, lisinopril (ACE I and ARB).   -Some hypotension to 80-90's systolic during stay as well as bradycardia. ACEI/ ARB on board.  -Renal US with small R renal cyst, no obstruction.   -Creatinine improved to 1.50.    -Holding ACE I and ARB, would only take either ACE I or ARB in future  -Avoid nephrotoxins  -Nephrology signed off 11/8.  Appreciate assistance.     Acute liver injury likely ischemic, alcohol use.  -Some hypotension to 80-90's systolic during stay as well as bradycardia.  -On 11/7 - , .  Bilirubin 1.8.  -Ultrasound right upper quadrant without acute findings  -LFTs trending down, LFTs within 1 week as outpatient.    Leukocytosis with possible aspiration pneumonia.  Lactic acidosis likely from fall.  -Admit WBC at 25.6-> 14.4  -Procalcitonin 4.69 > 0.18.  Lactic acid 4.1 >1.0  -Blood cultures with NGTD. MRSA swabs negative  -CXR 11/4 relatively unremarkable, ? Small infiltrate L base  -UA nitrite negative, leukocyte esterase trace.1 WBC.  -Was on empiric zosyn 10/31 and completed a 7 day course     Acute anemia from blood loss, component of dilutional.  -Presented with hemoglobin of 12.2, dropped to 8.2.  -Hemoglobin stable    Possible mild cognitive impairment  Delirium. Resolved   -Intermittent episode of pulling out IVs, pulled out chest tube 11/3.  This morning awake and able to answer simple commands. Per family report history of heavy alcohol use.  -Will need cognitive evaluation as outpatient. Discussed no driving until cognitive eval.    Chronic alcohol abuse.  Has long standing history of heavy alcohol use per wife and daughter report.  Lately has been drinking few times a week, can drink as much as half a bottle each time.  Family unsure when last drink was.  Unsure if contributed to fall. No signs of withdrawal during hospitalization.  -discussed with family to seek out  rehab assistance once transition from TCU     Hematuria  UA from admission with large blood, RBCs.  UA 11/7 showing possible moderate leukocyte esterase with 64 WBCs, 182 RBCs.  Urine cultures 11/7 negative. Completed course of IV Zosyn [11/6].    -Urology consulted, patient underwent CT urogram prior to discharge.  Plan is for outpatient follow-up with urology in 1 week.     DM type II hemoglobin A1c 7.7.  PTA on Metformin 500 mg BID, Actos 30 mg and insulin pump.   PTA insulin dosing ~50-60 Units daily through pump.   Holding PTA meds and insulin pump   Was on Lantus 20 units BID (11/3 ) switched to insulin Lantus 12 units twice daily (11/7) for hypoglycemia.  -Blood sugar poorly controlled today, increase Lantus to 15 units twice a day.  -Increase to medium intensity sliding scale.     Physical deconditioning from acute illness, senile fragility.  -PT, OT recommending TCU.      Jas Jaramillo MD    Significant Results and Procedures   See below    Pending Results   Unresulted Labs Ordered in the Past 30 Days of this Admission     No orders found from 9/30/2019 to 10/31/2019.          Code Status   Full Code       Primary Care Physician   Senthil Moya    Physical Exam   Temp: 97.9  F (36.6  C) Temp src: Oral BP: (!) 143/75 Pulse: 83 Heart Rate: 65 Resp: 16 SpO2: 94 % O2 Device: None (Room air)      Constitutional: AAOX3, NAD  Respiratory: CTA B/L, Normal WOB  Cardiovascular: RRR, No murmur  GI: Soft, Non- tender, BS- normoactive  Neuro: CN- grossly intact, Moving X 4     Discharge Disposition   Discharged to short-term care facility  Condition at discharge: Stable    Consultations This Hospital Stay   THORACIC SURGERY IP CONSULT  RESPIRATORY CARE IP CONSULT  HOSPITALIST IP CONSULT  PHARMACY TO DOSE VANCO  PHYSICAL THERAPY ADULT IP CONSULT  NEPHROLOGY IP CONSULT  CARE TRANSITION RN/SW IP CONSULT  SWALLOW EVAL SPEECH PATH AT BEDSIDE IP CONSULT  ELECTROPHYSIOLOGY IP CONSULT  UROLOGY IP CONSULT  PHYSICAL THERAPY  ADULT IP CONSULT  OCCUPATIONAL THERAPY ADULT IP CONSULT    Time Spent on this Encounter   I, Jas Jaramillo MD, personally saw the patient today and spent greater than 30 minutes discharging this patient.    Discharge Orders      POST-OP FOLLOW-UP VISIT    Call [facility] at [phone] to schedule follow-up with Dr. Hinojosa in 1-2 weeks.     Mantoux instructions    Give two-step Mantoux (PPD) Per Facility Policy Yes     Activity - Up with nursing assistance     Additional Discharge Instructions    Aggressive incentive spirometry every 2 hours when awake     General info for SNF    Length of Stay Estimate: Short Term Care: Estimated # of Days <30  Condition at Discharge: Improving  Level of care:skilled   Rehabilitation Potential: Good  Admission H&P remains valid and up-to-date: Yes  Recent Chemotherapy: N/A  Use Nursing Home Standing Orders: N/A     Follow Up and recommended labs and tests    Follow up with [facility] physician.  The following labs/tests are recommended: CMP in 1 week  Follow-up with urology in 1 week  Follow-up with cardiology in 1-2 weeks     Full Code     Physical Therapy Adult Consult    Evaluate and treat as clinically indicated.    Reason:     Occupational Therapy Adult Consult    Evaluate and treat as clinically indicated.    Reason:     Fall precautions     Advance Diet as Tolerated    Follow this diet upon discharge: Orders Placed This Encounter      Snacks/Supplements Adult: Other; chocolate Boost Glucose Control with meals  (RD); With Meals      Snacks/Supplements Adult: Other; Lunch: HT berry smoothie,  Dinner: str/banana smoothie (RD); With Meals      Combination Diet [id number] Calories: Moderate Consistent CHO (4-6 CHO units/meal); Dysphagia Diet Level 3: Advanced; Thin Liquids (water, ice chips, juice, milk gelatin, ice      Discharge Medications   Current Discharge Medication List      START taking these medications    Details   furosemide (LASIX) 40 MG tablet  Take 1 tablet (40 mg) by mouth daily for 3 days    Associated Diagnoses: Essential hypertension      insulin aspart (NOVOLOG PEN) 100 UNIT/ML pen Inject 1-10 Units Subcutaneous 3 times daily (before meals)    Comments: For  - 224 give 1 units.   For  - 249 give 2 units.   For  - 274 give 3 units.   For  - 299 give 4 units.   For  - 324 give 5 units.   For  - 349 give 6 units.   For BG greater than or equal to 350 give 7 units.  Associated Diagnoses: Type 2 diabetes, HbA1c goal < 7% (H)      insulin glargine (LANTUS PEN) 100 UNIT/ML pen Inject 15 Units Subcutaneous 2 times daily    Comments: If Lantus is not covered by insurance, may substitute Basaglar at same dose and frequency.    Associated Diagnoses: Type 2 diabetes, HbA1c goal < 7% (H)      melatonin 3 MG tablet Take 1 tablet (3 mg) by mouth nightly as needed for sleep    Associated Diagnoses: Insomnia, unspecified type      metoprolol tartrate (LOPRESSOR) 25 MG tablet Take 1 tablet (25 mg) by mouth 2 times daily    Associated Diagnoses: Essential hypertension      pantoprazole (PROTONIX) 20 MG EC tablet Take 1 tablet (20 mg) by mouth daily    Associated Diagnoses: Gastroesophageal reflux disease without esophagitis         CONTINUE these medications which have CHANGED    Details   apixaban ANTICOAGULANT (ELIQUIS) 5 MG tablet Take 1 tablet (5 mg) by mouth 2 times daily    Associated Diagnoses: Chronic atrial fibrillation      verapamil ER (CALAN-SR) 120 MG CR tablet Take 1 tablet (120 mg) by mouth At Bedtime    Associated Diagnoses: Essential hypertension         CONTINUE these medications which have NOT CHANGED    Details   cloNIDine (CATAPRES) 0.3 MG tablet Take 0.3 mg by mouth 2 times daily      glucagon 1 MG kit 1 mg as needed for low blood sugar      INSULIN PUMP - OUTPATIENT Novolog Insulin  BASAL RATES and times:  All day: 0.95 units/hour    CARB RATIO: 1 unit per 15 grams  Correction Factor (Sensitivity):  55mg/dL  BLOOD GLUCOSE TARGET and times:  12   AM (midnight): 100 - 140  5:30     AM:  100 - 120  10   PM:  100 - 140  Active Insulin Time:  5 hours     (Per Rx, pt uses 50-60 units/day)      LOVASTATIN 20 MG PO TABS 1 TABLET DAILY AT DINNER  Qty: 90 Tab, Refills: 0    Associated Diagnoses: Mixed hyperlipidemia      metFORMIN (GLUCOPHAGE) 500 MG tablet Take 500 mg by mouth 2 times daily (with meals)      minocycline (MINOCIN/DYNACIN) 100 MG capsule Take 100 mg by mouth 2 times daily       pioglitazone (ACTOS) 30 MG tablet Take 30 mg by mouth daily      triamcinolone (KENALOG) 0.1 % external ointment Apply topically daily as needed for irritation      ACCU-CHEK COMPACT TEST DRUM VI STRP check blood sugar three times a day and PRN  Qty: 102, Refills: 10    Associated Diagnoses: Type II or unspecified type diabetes mellitus without mention of complication, not stated as uncontrolled         STOP taking these medications       LISINOPRIL 40 MG PO TABS Comments:   Reason for Stopping:         losartan-hydrochlorothiazide (HYZAAR) 100-25 MG tablet Comments:   Reason for Stopping:         propranolol (INDERAL) 60 MG tablet Comments:   Reason for Stopping:             Allergies   Allergies   Allergen Reactions     No Known Drug Allergies      Data   Most Recent 3 CBC's:  Recent Labs   Lab Test 11/10/19  0750 11/08/19  0658 11/07/19  0714   WBC 13.5* 14.4* 20.9*   HGB 9.7* 9.0* 10.1*   MCV 93 93 94    250 330      Most Recent 3 BMP's:  Recent Labs   Lab Test 11/10/19  0750 11/09/19  1054 11/08/19  0658    137 144   POTASSIUM 3.4 4.0 3.8   CHLORIDE 103 103 111*   CO2 28 29 30   BUN 20 26 25   CR 1.36* 1.50* 1.58*   ANIONGAP 6 5 3   LLOYD 8.4* 8.1* 8.3*   * 405* 100*     Most Recent 2 LFT's:  Recent Labs   Lab Test 11/10/19  0750 11/09/19  1054   AST 49* 53*   * 257*   ALKPHOS 119 105   BILITOTAL 1.2 1.5*     Most Recent INR's and Anticoagulation Dosing History:  Anticoagulation Dose History     Recent  Dosing and Labs Latest Ref Rng & Units 10/30/2019 10/31/2019 11/7/2019    INR 0.86 - 1.14 1.40(H) 1.37(H) 1.44(H)        Most Recent 3 Troponin's:  Recent Labs   Lab Test 11/08/19  1525 11/08/19  1120 11/08/19  0658   TROPI 0.210* 0.266* 0.288*     Most Recent Cholesterol Panel:No lab results found.  Most Recent 6 Bacteria Isolates From Any Culture (See EPIC Reports for Culture Details):  Recent Labs   Lab Test 11/07/19  1105 10/31/19  2144 10/31/19  2141   CULT 10,000 to 50,000 colonies/mL  Candida albicans / dubliniensis  Candida albicans and Candida dubliniensis are not routinely speciated  Susceptibility testing not routinely done  * No growth No growth     Most Recent TSH, T4 and A1c Labs:  Recent Labs   Lab Test 11/08/19  0658 10/31/19  0505   TSH 3.90  --    A1C  --  7.7*       Results for orders placed or performed during the hospital encounter of 10/30/19   CT Head w/o Contrast    Narrative    CT SCAN OF THE HEAD WITHOUT CONTRAST   10/30/2019 5:21 PM     HISTORY: Trauma    TECHNIQUE: Axial images of the head and coronal reformations without  IV contrast material. Radiation dose for this scan was reduced using  automated exposure control, adjustment of the mA and/or kV according  to patient size, or iterative reconstruction technique.    COMPARISON: None.    FINDINGS: There is no evidence of intracranial hemorrhage, mass, acute  infarct or other acute abnormality. Generalized atrophy of the brain.  There is low attenuation in the white matter of the cerebral  hemispheres consistent with sequelae of small vessel ischemic disease.  Ventricular size is within normal limits without evidence of  hydrocephalus. There is a round low-attenuation lesion measuring  approximately 4 mm (series 2 image 17) interposed between the anterior  aspect of the midbrain and the superior aspect of the basilar artery,  essentially in the upper aspect of the interpeduncular cistern. This  lesion appears to demonstrate fat  attenuation internally, and may  represent a small lipoma. No associated mass effect.    Bilateral lens replacements. Polypoid mucosal thickening in the  alveolar recess of the left maxillary sinus. Mastoid and middle ear  cavities appear clear. Advanced bilateral temporomandibular joint  osteoarthritis. The bony calvarium and bones of the skull base appear  intact.     On the  image, there appears to be asymmetric opacification  projecting over the left lung apex, incompletely evaluated.      Impression    IMPRESSION:  1. No CT findings of acute traumatic intracranial abnormality.  2. Generalized brain parenchymal volume loss. Scattered  hypoattenuation in the cerebral white matter, likely related to small  vessel ischemic disease.  3. Other chronic-appearing intracranial findings, as detailed in the  body of the report.  4. On the  image, note is made of asymmetric opacification of the  left lung apex, incompletely evaluated. Recommend dedicated chest  radiographs for further characterization.    ALLYSON HOU MD   CT Cervical Spine w/o Contrast     Value    Radiologist flags Acute cervical spine fracture (AA)    Narrative    CT CERVICAL SPINE WITHOUT CONTRAST   10/30/2019 5:20 PM     HISTORY: Trauma     TECHNIQUE: Axial images of the cervical spine were obtained without  intravenous contrast. Multiplanar reformations were performed.   Radiation dose for this scan was reduced using automated exposure  control, adjustment of the mA and/or kV according to patient size, or  iterative reconstruction technique.    COMPARISON: None.    FINDINGS: There is an acute fracture that involves the spinous process  of C3 (series 6 image 38-40, series 5 image 37). There is a subtle  cortical irregularity involving the right aspect of the C7 vertebral  body which is indeterminate for a nondisplaced fracture. There are  acute fractures involving right anterolateral aspect of the T2  inferior endplate extending into the  intervertebral disc space,  possibly also involving the superior endplate of T2 but incompletely  evaluated. Multiple left-sided upper rib fractures. Contusion of the  left upper pulmonary lobe with associated pleural fluid/hemothorax.  Small volume scattered air noted within the anterior and posterior  soft tissues of the upper chest/back.    There is a background of multilevel degenerative disc disease and  facet arthropathy of the cervical spine. Multilevel uncovertebral  arthropathy is also noted. No high-grade spinal stenosis is  identified. Varying degrees of multilevel neural foraminal narrowing  is seen, most pronounced on the left at C4-C5. Bilateral carotid  bifurcation atherosclerotic calcifications are noted.      Impression    IMPRESSION:  1. Acute C3 spinous process fracture.  2. Acute fractures involving the right anterolateral aspect of the T2  vertebral body extending into the disc space. Recommend dedicated  thoracic spine CT.  3. Subtle cortical irregularity involving the right aspect of the C7  vertebral body, indeterminate for subtle acute fracture.  4. Multiple acute left-sided upper rib fractures with left-sided  pulmonary contusion and hemothorax.    [Critical Result: Acute cervical spine fracture]    Finding was identified on 10/30/2019 5:38 PM.     Dr. Sebastian was contacted by me on 10/30/2019 5:52 PM and verbalized  understanding of the critical result.     ALLYSON HOU MD   Chest CT w/o contrast    Narrative    CT CHEST WITHOUT CONTRAST 10/30/2019 5:21 PM     HISTORY: Trauma, left chest/shoulder pain.    COMPARISON: None.    TECHNIQUE: Volumetric helical acquisition of CT images of the chest  from the clavicles to the kidneys were acquired without IV contrast.  Radiation dose for this scan was reduced using automated exposure  control, adjustment of the mA and/or kV according to patient size, or  iterative reconstruction technique.    FINDINGS:  Posterolateral fourth, fifth, sixth,  seventh, eighth, and  ninth rib fractures noted on the left. Rib 7 is displaced, and  overlapping. Ribs 8 and 9 are displaced as well as 5, 4, and 6. There  is a moderate hemothorax. Trace probable pneumothorax inferiorly.  Pulmonary contusion and/or atelectasis noted on the left. Minimal  right pleural effusion and associated atelectasis. Medial rib  fractures of 8th and 9th ribs as well as a posterior fracture of the  sixth rib. There are extensive coronary vascular calcifications and/or  stents consistent with coronary artery disease. There are mild  atherosclerotic changes of the visualized aorta and its branches.  There is no evidence of aortic aneurysm. No acute findings in the  visualized upper abdomen.      Impression    IMPRESSION: Multiple left-sided rib fractures, ribs 6, 8, and 9 are  fractured in two places. Moderate left hemothorax and trace pleural  air on the left.    ALLYSON MARTINEZ MD   XR Chest Port 1 View    Narrative    CHEST ONE VIEW SUPINE 10/30/2019 7:06 PM     HISTORY: Chest tube placement    COMPARISON: None.      Impression    IMPRESSION: Chest tube placed on the left with tip near the apex.  Multiple rib fractures noted on the left. Right lung clear.    ALLYSON MARTINEZ MD   XR Chest Port 1 View    Narrative    CHEST PORTABLE ONE VIEW   10/31/2019 9:55 AM     HISTORY: Left hemothorax, multiple left rib fractures.    COMPARISON: 10/30/2019.      Impression    IMPRESSION: Left chest tube is stable. Tiny left apical pneumothorax.  Multiple left rib fractures. Patchy opacities at the left mid to  inferior lung appears stable and could be some atelectasis. Right lung  is clear. Stable enlarged cardiac silhouette.    ROSA PAGE MD   US Renal Complete    Narrative    US RENAL COMPLETE  10/31/2019 11:04 PM     HISTORY: Acute kidney injury.    COMPARISON: None.    FINDINGS: The kidneys are normal in size, shape and position. The  right kidney measures 10.1 x 5.0 x 5.0 cm with a cortical thickness  of  1.3 cm. The left kidney measures 10.5 x 5.4 x 4.8 cm with a cortical  thickness of 1.9 cm. There is a 1.3 cm cyst at the lower pole of the  right kidney. No solid renal mass, stone, or collecting system  dilatation. The urinary bladder is distended and appears normal.      Impression    IMPRESSION: Small right renal cyst. Otherwise unremarkable renal  ultrasound. No hydronephrosis.    ELMER SEANZ MD   XR Chest Port 1 View    Narrative    CHEST ONE VIEW UPRIGHT 11/1/2019 6:25 AM     HISTORY: Left hemothorax, multiple left rib fracture.    COMPARISON: October 31, 2019      Impression    IMPRESSION: Chest tube noted on the left, no pneumothorax. Multiple  left-sided rib fractures again evident.    ALLYSON MARTINEZ MD   XR Chest Port 1 View    Narrative    CHEST ONE VIEW PORTABLE   11/2/2019 5:42 AM     HISTORY: left hemothorax, multiple left rib fx    COMPARISON: 11/1/2019 chest x-ray 0625 hours      Impression    IMPRESSION: No significant interval change. Redemonstrated left chest  tube and multiple left rib fractures, no pneumothorax. Mild  retrocardiac opacity lower left lung consistent with atelectasis or  infiltrate or possibly contusion. Right lung clear. Normal caliber  pulmonary vessels.    JENNIFER BROWN MD   XR Chest Port 1 View    Narrative    XR CHEST PORTABLE 1 VIEW   11/3/2019 12:05 AM     HISTORY: Chest tube out.    COMPARISON: 11/2/2019.    FINDINGS: Upright portable chest. The left chest tube has been  removed. There is no pneumothorax. The heart is enlarged. No pulmonary  edema. The thoracic aorta is calcified. There is mild right basilar  probable atelectasis. The lungs are otherwise clear. Multiple  posterior left rib fractures.      Impression    IMPRESSION: No pneumothorax post chest tube removal.    ELMER SAENZ MD   XR Chest 2 Views    Narrative    CHEST TWO VIEWS November 4, 2019 6:17 AM     HISTORY: Hemothorax.    COMPARISON: November 2, 2019.       Impression    IMPRESSION: No  change in left-sided rib fractures. Question trace  residual atelectasis and/or infiltrate at the left base, lungs  otherwise clear. There are no acute infiltrates. The cardiac  silhouette is not enlarged. Pulmonary vasculature is unremarkable.    ALLYSON MARTINEZ MD   XR Chest Port 1 View    Narrative    CHEST PORTABLE ONE VIEW November 5, 2019 6:04 AM     HISTORY: Left hemothorax, multiple left rib fracture.    COMPARISON: Chest x-ray 11/4/2019.      Impression    IMPRESSION: Portable view of the chest is performed. Left rib  fractures are again noted. Right lung is clear. No evidence of right  pleural effusion. Retrocardiac opacity appears stable. Small left  pleural effusion may have increased since prior study due to obscuring  of the left hemidiaphragm. Heart remains enlarged. Aorta demonstrates  calcified plaque. No evidence of pneumothorax.    PAZ TERRY MD   XR Chest Port 1 View    Narrative    CHEST PORTABLE ONE VIEW   11/6/2019 5:32 AM     HISTORY: Left hemothorax, multiple left rib fractures.    COMPARISON: 11/5/2019.      Impression    IMPRESSION: Stable dense opacification at the left lung base. No new  airspace disease. Stable cardiac silhouette.    ROSA PAGE MD   CT Thoracic Spine w/o Contrast    Narrative    CT THORACIC SPINE WITHOUT CONTRAST   10/30/2019 6:32 PM     HISTORY: Thoracic spine fracture, traumatic. Trauma, multiple rib  fractures, CT cervical spine shows C3 spinous process fracture and  T2-T3 fracture into spine.     TECHNIQUE: Axial images of the thoracic spine were obtained without  intravenous contrast. Multiplanar reformations were performed.  Radiation dose for this scan was reduced using automated exposure  control, adjustment of the mA and/or kV according to patient size, or  iterative reconstruction technique.    COMPARISON: Cervical spine CT same date.    FINDINGS: Subtle nondisplaced acute fracture involving the inferior  endplate of T2 (series 15 image 16). The fracture line  extends into  the region of the T2-T3 intervertebral disc space. Slight undulation  of the superior endplates of T3 and T4, age-indeterminate. The  remaining vertebral body heights appear maintained. There are acute  nondisplaced fractures involving the left T5-T9 transverse processes.  Alignment of the thoracic spine is within normal limits. Bridging  ossification along the right anterolateral aspect of the upper to mid  thoracic spine, most pronounced from T3 through T11, consistent with  diffuse idiopathic skeletal hyperostosis. There is a background of  mild multilevel degenerative disc disease. No high-grade spinal  stenosis is seen.    Multiple acute left-sided rib fractures are also noted. Partially  visualized left pulmonary contusions with left hemothorax and trace  left pleural air, better characterized on chest CT of same date.  Please see chest CT of same day for details regarding extraspinal  findings. Small right pleural effusion also noted with basilar  atelectasis. Coronary artery and aortic atherosclerotic calcifications  are noted.      Impression    IMPRESSION:  1. Subtle nondisplaced acute fracture involving the T2 inferior  endplate extending into the T2-T3 disc space.  2. Age-indeterminate mild contour deformities involving the T2 and T3  superior endplates.  3. Acute left T5-T9 transverse process fractures.  4. Multiple acute left-sided rib fractures, left pulmonary contusion,  and left hemopneumothorax, better characterized on chest CT of same  date.    ALLYSON HOU MD   US Abdomen Limited    Narrative    ULTRASOUND ABDOMEN LIMITED  11/8/2019 10:49 AM     HISTORY: Transaminitis, alcohol use, evaluate for liver cirrhosis,  masses.    COMPARISON: October 31, 2019    FINDINGS:  Liver is course in echogenicity without gross focal  lesions, examination is limited by breathing motion and overlying  bowel gas. Gallbladder is normal without stones or sludge.  Extrahepatic bile duct is normal in  diameter. Pancreas is completely  obscured. Right kidney is normal in size. There is no hydronephrosis.      Impression    IMPRESSION:  No definite hepatoma demonstrated, nonspecific coarse  echogenicity of the liver.       ALLYSON MARTINEZ MD   XR Chest 2 Views    Narrative    CHEST TWO VIEWS 11/8/2019 10:26 AM     HISTORY: Follow up for pleural effusion, infiltrate.    COMPARISON: November 8, 2019       Impression    IMPRESSION: Small to moderate left pleural effusion with associated  atelectasis and/or infiltrate. Minimal right pleural fluid. Upper  lungs clear. The cardiac silhouette is not enlarged. Pulmonary  vasculature is unremarkable.    ALLYSON MARTINEZ MD   XR Ribs Left 2 Views    Narrative    RIBS LEFT TWO VIEWS   11/8/2019 10:25 AM     HISTORY:  Follow up for rib fractures.    COMPARISON: 11/6/2019      Impression    IMPRESSION: Again there are displaced fractures of the left second  through ninth ribs. These do not appear healed.    ANN MARIE KELSEY MD   CT Urogram wo & w Contrast    Narrative    CT UROGRAM WITHOUT AND WITH CONTRAST  11/9/2019 5:09 PM    HISTORY: Gross hematuria after trauma.  Evaluate for urinary injury.    TECHNIQUE: Scans obtained from the diaphragm through the pelvis  without and with IV contrast, 100 mL Isovue-370. Radiation dose for  this scan was reduced using automated exposure control, adjustment of  the mA and/or kV according to patient size, or iterative  reconstruction technique.    COMPARISON:  Limited abdominal ultrasound dated 11/8/2019.    FINDINGS: Moderate sized left and small size right pleural fluid  collections with adjacent compressive atelectasis in the posterior  aspects of the lung bases are noted. There are extensive coronary  artery calcifications and/or stents. The heart is mildly enlarged.  Visualized portions of the lung bases and mediastinal contents are  otherwise grossly unremarkable. No aggressive osseous lesions are  identified. There are degenerative changes in  the spine. There are at  least posterior left 9th and 10th rib fractures. The posterior left  ninth rib fracture is significantly displaced by at least 1.5 bone  width. No other evidence for acute osseous fracture is seen.    Monitor devices on skin surface cause streak artifact mildly limiting  evaluation of the underlying soft tissues. The liver, gallbladder,  pancreas, spleen, bilateral adrenal glands, and bilateral kidneys  enhance normally. No evidence for acute traumatic injury is  identified. No hydronephrosis, hydroureter, ureteral calculus or right  nephrolithiasis is seen. There is a nonobstructive left intrarenal  calculus (image 37 series 3) measuring up to 0.4 cm. The urinary  bladder is grossly unremarkable other than a small indentation by  prostate gland posteriorly. Prostate gland is enlarged.    No adenopathy, free fluid or free air is seen in the peritoneal  cavity. There is nonaneurysmal atherosclerosis. Edema is seen in the  subcutaneous adipose tissues around the pelvis.    Scattered diverticuli are seen in the colon but are most predominantly  noted in the sigmoid and descending colon. No pericolonic inflammatory  change to suggest acute diverticulitis. Appendix is normal in  appearance. Cecum is slightly flipped to the midline. Small bowel is  of normal caliber. Stomach is mostly decompressed but otherwise  unremarkable.      Impression    IMPRESSION:  1. Nonobstructive left intrarenal calculus measures up to 0.4 cm.  2. Moderate-sized left and small right pleural fluid collection with  adjacent compressive atelectasis in the lung bases. Fractures of the  left posterior 9th and 10th ribs are noted. There are also likely  fractures of other ribs which are not completely included on this  study. No other fractures.  3. No evidence for acute traumatic injury to the abdomen or pelvis is  identified. No evidence for renal, splenic, or hepatic laceration.  4. Nonaneurysmal atherosclerosis. Probable  coronary artery stents  versus dense coronary artery calcifications are noted.  5. Edema in subcutaneous adipose tissues around pelvis is noted.     CEDRICK BRAR MD   Echocardiogram Complete    Narrative    [id number]  [id number]  [id number]  [id number]^DOMINGUEZ^NAZANIN^PEYTON           [facility]  Echocardiography Laboratory  [address] [place] [address]        Name: CEDRICK PHILLIPS  MRN: [id number]  : 1940  Study Date: 2019 04:10 PM  Age: 79 yrs  Gender: Male  Patient Location: [facility]  Reason For Study: Bradycardia - Sinus  Ordering Physician: NAZANIN MIX  Referring Physician: NGHIA PATEL  Performed By: Jimi Lee RDCS     BSA: 2.1 m2  Height: 70 in  Weight: 205 lb  HR: 38  BP: 129/63 mmHg  _____________________________________________________________________________  __        Procedure  Complete Portable Echo Adult. Optison (NDC #[id number]) given intravenously.  (Suboptimal images, abbreviated study performed).  _____________________________________________________________________________  __        Interpretation Summary     The rhythm was undetermined. It is likely junctional at 38 bpm.  Left ventricular systolic function is normal. The visual ejection fraction is  estimated at 55-60%. There is moderate concentric left ventricular  hypertrophy.  The left atrium is moderate to severely dilated. The right atrium is mild to  moderately dilated.  The right ventricle is mild to moderately dilated. The right ventricular  systolic function is mildly reduced.  There is mild to moderate (1-2+) tricuspid regurgitation. The right  ventricular systolic pressure is approximated at 27mmHg plus the right atrial  pressure. IVC diameter >2.1 cm collapsing <50% with sniff suggests a high RA  pressure estimated at 15 mmHg or greater.  Large left pleural effusion noted.  There is no comparison study available. The study was technically limited.  Contrast was used without apparent  complications.  _____________________________________________________________________________  __        Left Ventricle  The left ventricle is normal in size. There is moderate concentric left  ventricular hypertrophy. Left ventricular systolic function is normal. The  visual ejection fraction is estimated at 55-60%. Diastolic function not  assessed due to arrhythmia. No regional wall motion abnormalities noted.     Right Ventricle  The right ventricle is mild to moderately dilated. The right ventricle is not  well visualized. The right ventricular systolic function is mildly reduced.     Atria  The left atrium is moderate to severely dilated. The right atrium is mild to  moderately dilated. There is no atrial shunt seen.     Mitral Valve  The mitral valve is not well visualized. There is trace to mild mitral  regurgitation.        Tricuspid Valve  There is mild to moderate (1-2+) tricuspid regurgitation. The right  ventricular systolic pressure is approximated at 27mmHg plus the right atrial  pressure. IVC diameter >2.1 cm collapsing <50% with sniff suggests a high RA  pressure estimated at 15 mmHg or greater.     Aortic Valve  There is moderate trileaflet aortic sclerosis. There is trace aortic  regurgitation. No PW/CW - valve opening does not suggest moderate or severe  AS.     Pulmonic Valve  There is no pulmonic valvular stenosis.     Vessels  Normal size aorta.     Pericardium  The pericardium appears normal. Large left pleural effusion.        Rhythm  The rhythm was undetermined. It is likely junctional at 38 bpm.  _____________________________________________________________________________  __  MMode/2D Measurements & Calculations  IVSd: 1.4 cm     LVIDd: 4.4 cm  LVIDs: 3.1 cm  LVPWd: 1.4 cm  FS: 29.7 %  LV mass(C)d: 242.0 grams  LV mass(C)dI: 114.7 grams/m2  Ao root diam: 3.6 cm  LA dimension: 5.0 cm  asc Aorta Diam: 3.3 cm  LA/Ao: 1.4  RWT: 0.62        Doppler Measurements & Calculations  PA acc time:  0.08 sec  TR max twin: 262.0 cm/sec  TR max P.5 mmHg              _____________________________________________________________________________  __        Report approved by: Momo Stafford 2019 10:44 PM

## 2019-11-10 NOTE — PROGRESS NOTES
SW:  D: BERTHA [facility] to [facility].  P: Will continue to follow.    JERE Pérez

## 2019-11-10 NOTE — PLAN OF CARE
[id number]:  Pt a/ox4. Hypertensive, scheduled BP meds given. C/o breathing discomfort. Tylenol given prn. Adequate urine output via urinal. Bedtime glucose 197. Ax1.

## 2019-11-11 LAB — INTERPRETATION ECG - MUSE: NORMAL

## 2019-11-13 ENCOUNTER — NURSING HOME VISIT (OUTPATIENT)
Dept: GERIATRICS | Facility: CLINIC | Age: 79
End: 2019-11-13
Payer: COMMERCIAL

## 2019-11-13 VITALS
OXYGEN SATURATION: 95 % | HEART RATE: 75 BPM | DIASTOLIC BLOOD PRESSURE: 80 MMHG | BODY MASS INDEX: 31.38 KG/M2 | RESPIRATION RATE: 16 BRPM | TEMPERATURE: 97.5 F | WEIGHT: 218.7 LBS | SYSTOLIC BLOOD PRESSURE: 134 MMHG

## 2019-11-13 DIAGNOSIS — I48.19 PERSISTENT ATRIAL FIBRILLATION (H): ICD-10-CM

## 2019-11-13 DIAGNOSIS — S22.42XD: ICD-10-CM

## 2019-11-13 DIAGNOSIS — N17.9 ACUTE KIDNEY INJURY (H): ICD-10-CM

## 2019-11-13 DIAGNOSIS — D62 ANEMIA DUE TO BLOOD LOSS, ACUTE: ICD-10-CM

## 2019-11-13 DIAGNOSIS — R74.01 TRANSAMINITIS: ICD-10-CM

## 2019-11-13 DIAGNOSIS — I10 ESSENTIAL HYPERTENSION: ICD-10-CM

## 2019-11-13 DIAGNOSIS — J94.2 HEMOTHORAX ON LEFT: Primary | ICD-10-CM

## 2019-11-13 DIAGNOSIS — E87.79 OTHER HYPERVOLEMIA: ICD-10-CM

## 2019-11-13 DIAGNOSIS — R41.89 COGNITIVE IMPAIRMENT: ICD-10-CM

## 2019-11-13 DIAGNOSIS — R41.0 DELIRIUM: ICD-10-CM

## 2019-11-13 DIAGNOSIS — E11.59 TYPE 2 DIABETES MELLITUS WITH OTHER CIRCULATORY COMPLICATIONS (H): ICD-10-CM

## 2019-11-13 DIAGNOSIS — R53.81 PHYSICAL DECONDITIONING: ICD-10-CM

## 2019-11-13 PROCEDURE — 99310 SBSQ NF CARE HIGH MDM 45: CPT | Performed by: NURSE PRACTITIONER

## 2019-11-13 NOTE — LETTER
11/13/2019        RE: Tc Garcia  [address] [facility] [id number]        [facility]  PRIMARY CARE PROVIDER AND CLINIC:  Senthil Moya MD, [address] [facility] [address] / [place] [address]  Chief Complaint   Patient presents with     Hospital F/U     [facility] Medical Record Number:  [id number]  Place of Service where encounter took place:  [facility] ([facility]) [[id number]]    Tc Garcia  is a 79 year old  (1940), history of atrial fibrillation, DM type II, HTN admitted on 10/30/2019 after a fall down the stairs sustaining a C3 spinous process fracture, fracture of T2, multiple left sided rib fractures and moderate left hemothorax  admitted to the above facility from  [facility]. Hospital stay 10/30/19 through 11/10/19..  Admitted to this facility for  rehab, medical management and nursing care.    HPI:    HPI information obtained from: facility chart records, facility staff, patient report and [facility] chart review.   Brief Summary of Hospital Course:   Fall down the stairs, multiple left sided rib Fx, C3 spinous process Fx, Fx of T2, moderate left hemothorax: chest tube placed by trauma surgery, patient self pulled CT out 11/2, stable after, continue conservative management patient did well  Atrial fib on AC, : AC on eliquis, 11/6 A-flutter but rate controlled 11/7 HR elevated and 14 beat run of V-tach with CP during event, EP consulted and recommend conservative medical Tx, Echo with EF of 55-60%, mod to severely dilated left atrium, mod dilated right atrium, mod TR and large left pleural effusion. Eliquis restarted 11/6 OK by CT surgery, continue verapamil 11/4, started on metoprolol 12.5mg BID on 11/7 and increased to 25mg BID OP f/u cardiology  Volume overload: weight 205--> 216, diuresed IV lasix and continue PO for one week  Acute renal failure: possible CKD, creat 1.09--> 3.51, DC PTA losartan-HCTZ and lisinopril, creat improved to  1.5,  Transaminitis: patient uses alcohol at home, US RUQ no acute findings, LFT trending down  Leukocytosis with possible aspiration pneumonia: admit WBC 25.6--> 14.4, CXR ? Small infiltrate left base, Tx with IV Zosyn X 7 days  Anemia: Hgb 12.2--> 8.2 stable no transfusion  Delerium: resolved by DC, family report heavy alcohol use at home, discussed no driving until cognitive evaluation  Hematuria: UA from admission large blood, Urology consulted patient underwent CT urogram, plan for OP urology f/u  DMII: Hgb A1C 7.7 Holding PTA insulin pump, switched to lantus BID transitioned to 15 units BID  Updates on Status Since Skilled nursing Admission: On exam today patient is alert, sitting up in WC, denies pain or discomfort, states he has a little pain in left side with mobility and ambulation, denies SOB, cough, congestion, fever, chills. N/V/D or constipation, states he is sleeping well, patient wants to transition back to insulin pump blood sugars: AM: 107, noon 137, pm 171 and hs 145    CODE STATUS/ADVANCE DIRECTIVES DISCUSSION:   CPR/Full code   Patient's living condition: lives alone  ALLERGIES: No known drug allergies  PAST MEDICAL HISTORY:  has a past medical history of Diabetic PROTEINURIA (2003), Mixed hyperlipidemia (2003), Personal history of colonic polyps (1972), Rosacea (1989), Type II or unspecified type diabetes mellitus without mention of complication, not stated as uncontrolled (1999), and Unspecified essential hypertension (1994).  PAST SURGICAL HISTORY:   has a past surgical history that includes REMOVE TONSILS/ADENOIDS,<10 Y/O.  FAMILY HISTORY: family history includes Diabetes in his brother, brother, brother, brother, sister, sister, and sister; Family History Negative in his brother, brother, father, mother, sister, sister, and sister; Heart Disease in his sister.  SOCIAL HISTORY:   reports that he quit smoking about 11 years ago. His smoking use included cigarettes. He has a 8.00 pack-year  smoking history. He has never used smokeless tobacco. He reports current alcohol use of about 35.0 standard drinks of alcohol per week.    Post Discharge Medication Reconciliation Status: discharge medications reconciled, continue medications without change    Current Outpatient Medications   Medication Sig Dispense Refill     ACCU-CHEK COMPACT TEST DRUM VI STRP check blood sugar three times a day and  10     apixaban ANTICOAGULANT (ELIQUIS) 5 MG tablet Take 1 tablet (5 mg) by mouth 2 times daily       cloNIDine (CATAPRES) 0.3 MG tablet Take 0.3 mg by mouth 2 times daily       furosemide (LASIX) 40 MG tablet Take 1 tablet (40 mg) by mouth daily       glucagon 1 MG kit 1 mg as needed for low blood sugar       insulin aspart (NOVOLOG PEN) 100 UNIT/ML pen Inject 1-10 Units Subcutaneous 3 times daily (before meals)       insulin glargine (LANTUS PEN) 100 UNIT/ML pen Inject 15 Units Subcutaneous 2 times daily       INSULIN PUMP - OUTPATIENT Novolog Insulin  BASAL RATES and times:  All day: 0.95 units/hour    CARB RATIO: 1 unit per 15 grams  Correction Factor (Sensitivity): 55mg/dL  BLOOD GLUCOSE TARGET and times:  12   AM (midnight): 100 - 140  5:30     AM:  100 - 120  10   PM:  100 - 140  Active Insulin Time:  5 hours     (Per Rx, pt uses 50-60 units/day)       LOVASTATIN 20 MG PO TABS 1 TABLET DAILY AT DINNER 90 Tab 0     melatonin 3 MG tablet Take 1 tablet (3 mg) by mouth nightly as needed for sleep       metFORMIN (GLUCOPHAGE) 500 MG tablet Take 500 mg by mouth 2 times daily (with meals)       metoprolol tartrate (LOPRESSOR) 25 MG tablet Take 1 tablet (25 mg) by mouth 2 times daily       minocycline (MINOCIN/DYNACIN) 100 MG capsule Take 100 mg by mouth 2 times daily        pantoprazole (PROTONIX) 20 MG EC tablet Take 1 tablet (20 mg) by mouth daily       pioglitazone (ACTOS) 30 MG tablet Take 30 mg by mouth daily       triamcinolone (KENALOG) 0.1 % external ointment Apply topically daily as needed for  irritation       verapamil ER (CALAN-SR) 120 MG CR tablet Take 1 tablet (120 mg) by mouth At Bedtime         ROS:  10 point ROS of systems including Constitutional, Eyes, Respiratory, Cardiovascular, Gastroenterology, Genitourinary, Integumentary, Musculoskeletal, Psychiatric were all negative except for pertinent positives noted in my HPI.    Vitals:  /80   Pulse 75   Temp 97.5  F (36.4  C)   Resp 16   Wt 99.2 kg (218 lb 11.2 oz)   SpO2 95%   BMI 31.38 kg/m     Exam:  GENERAL APPEARANCE:  Alert, in no distress  ENT:  Mouth and posterior oropharynx normal, moist mucous membranes, [place]  EYES:  EOM, conjunctivae, lids, pupils and irises normal, PERRL  RESP:  respiratory effort and palpation of chest normal, no respiratory distress, diminished breath sounds bases bilaterally, fine crackles left base noted  CV:  Palpation and auscultation of heart done , regular rate and rhythm, no murmur, rub, or gallop, peripheral edema 1+ in LE bilaterally  ABDOMEN:  normal bowel sounds, soft, nontender, no hepatosplenomegaly or other masses  M/S:   Examination of:   right upper extremity, left upper extremity, right lower extremity and left lower extremity  Inspection, ROM, stability and muscle strength normal  SKIN:  Inspection of skin and subcutaneous tissue baseline, ecchymosis healing left flank area  NEURO:   Cranial nerves 2-12 are normal tested and grossly at patient's baseline, speech WNL  PSYCH:  affect and mood normal    Lab/Diagnostic data:  Recent labs in [facility] reviewed by me today.     ASSESSMENT/PLAN:  Hemothorax on left  Closed traumatic minimally displaced fracture of multiple ribs of left side with routine healing, subsequent encounter  Physical deconditioning  Anemia due to blood loss, acute  Acute/ongoing: PT and OT for strengthening, monitor SaO2 at rest and with activity, tylenol 650mg q 4 hours prn, CBC on Friday    Persistent atrial fibrillation  Essential hypertension  Acute/ongoing: unstable :  vitals daily and prn, CMP on Friday, eliquis 5mg BID, metoprolol 25mg BID, verapamil 120mg QD, clonidine 0.3mg BID  F/u with cardiology Dr. Salinas in one week    Type 2 diabetes mellitus with other circulatory complications (H)  Acute/ongoing: hypoglycemia over past 24 hours:  Blood sugar monitoring QID and prn, continue actos 30mg QD,  Metformin 500mg BID, decrease lantus to 10 units SQ BID will transition back to insulin pump once blood sugars are stable    Acute kidney injury (H)  Other hypervolemia  Acute/unstable: CMP on friday, continue lasix 40mg QD for one week    Transaminitis  Acute: CMP on Friday    Delirium  Alcoholism /alcohol abuse (H)  Cognitive impairment  Acute/ongoing: OT evaluation, monitor       Orders written by provider at facility  CBC and CMP on Friday  Decrease lantus to 10 units SQ BID  Make appt with cardiology Dr. Salinas    Total time spent with patient visit at the [facility] was 45 min including patient visit and review of past records. Greater than 50% of total time spent with counseling and coordinating care due to DIscussed pain medication and pain control, discussed diabetic management and monitoring blood sugars decrease lantus at this time will transition back to insulin pump once blood sugars stable, discussed therapy and POC, no other concerns noted.     Electronically signed by:  Tonya Lynn Haase, APRN CNP                         Sincerely,        Tonya Lynn Haase, APRN CNP

## 2019-11-13 NOTE — PROGRESS NOTES
[place] [facility]  PRIMARY CARE PROVIDER AND CLINIC:  Senthil Moya MD, [address] / [place] [address]  Chief Complaint   Patient presents with     Hospital F/U     [facility] Medical Record Number:  [id number]  Place of Service where encounter took place:  [facility] (S) [[id number]]    Tc Garcia  is a 79 year old  (1940), history of atrial fibrillation, DM type II, HTN admitted on 10/30/2019 after a fall down the stairs sustaining a C3 spinous process fracture, fracture of T2, multiple left sided rib fractures and moderate left hemothorax  admitted to the above facility from  [facility]. Hospital stay 10/30/19 through 11/10/19..  Admitted to this facility for  rehab, medical management and nursing care.    HPI:    HPI information obtained from: facility chart records, facility staff, patient report and [facility] chart review.   Brief Summary of Hospital Course:   Fall down the stairs, multiple left sided rib Fx, C3 spinous process Fx, Fx of T2, moderate left hemothorax: chest tube placed by trauma surgery, patient self pulled CT out 11/2, stable after, continue conservative management patient did well  Atrial fib on AC, : AC on eliquis, 11/6 A-flutter but rate controlled 11/7 HR elevated and 14 beat run of V-tach with CP during event, EP consulted and recommend conservative medical Tx, Echo with EF of 55-60%, mod to severely dilated left atrium, mod dilated right atrium, mod TR and large left pleural effusion. Eliquis restarted 11/6 OK by CT surgery, continue verapamil 11/4, started on metoprolol 12.5mg BID on 11/7 and increased to 25mg BID OP f/u cardiology  Volume overload: weight 205--> 216, diuresed IV lasix and continue PO for one week  Acute renal failure: possible CKD, creat 1.09--> 3.51, DC PTA losartan-HCTZ and lisinopril, creat improved to 1.5,  Transaminitis: patient uses alcohol at home, US RUQ no acute findings, LFT trending  down  Leukocytosis with possible aspiration pneumonia: admit WBC 25.6--> 14.4, CXR ? Small infiltrate left base, Tx with IV Zosyn X 7 days  Anemia: Hgb 12.2--> 8.2 stable no transfusion  Delerium: resolved by DC, family report heavy alcohol use at home, discussed no driving until cognitive evaluation  Hematuria: UA from admission large blood, Urology consulted patient underwent CT urogram, plan for OP urology f/u  DMII: Hgb A1C 7.7 Holding PTA insulin pump, switched to lantus BID transitioned to 15 units BID  Updates on Status Since Skilled nursing Admission: On exam today patient is alert, sitting up in WC, denies pain or discomfort, states he has a little pain in left side with mobility and ambulation, denies SOB, cough, congestion, fever, chills. N/V/D or constipation, states he is sleeping well, patient wants to transition back to insulin pump blood sugars: AM: 107, noon 137, pm 171 and hs 145    CODE STATUS/ADVANCE DIRECTIVES DISCUSSION:   CPR/Full code   Patient's living condition: lives alone  ALLERGIES: No known drug allergies  PAST MEDICAL HISTORY:  has a past medical history of Diabetic PROTEINURIA (2003), Mixed hyperlipidemia (2003), Personal history of colonic polyps (1972), Rosacea (1989), Type II or unspecified type diabetes mellitus without mention of complication, not stated as uncontrolled (1999), and Unspecified essential hypertension (1994).  PAST SURGICAL HISTORY:   has a past surgical history that includes REMOVE TONSILS/ADENOIDS,<10 Y/O.  FAMILY HISTORY: family history includes Diabetes in his brother, brother, brother, brother, sister, sister, and sister; Family History Negative in his brother, brother, father, mother, sister, sister, and sister; Heart Disease in his sister.  SOCIAL HISTORY:   reports that he quit smoking about 11 years ago. His smoking use included cigarettes. He has a 8.00 pack-year smoking history. He has never used smokeless tobacco. He reports current alcohol use of about  35.0 standard drinks of alcohol per week.    Post Discharge Medication Reconciliation Status: discharge medications reconciled, continue medications without change    Current Outpatient Medications   Medication Sig Dispense Refill     ACCU-CHEK COMPACT TEST DRUM VI STRP check blood sugar three times a day and  10     apixaban ANTICOAGULANT (ELIQUIS) 5 MG tablet Take 1 tablet (5 mg) by mouth 2 times daily       cloNIDine (CATAPRES) 0.3 MG tablet Take 0.3 mg by mouth 2 times daily       furosemide (LASIX) 40 MG tablet Take 1 tablet (40 mg) by mouth daily       glucagon 1 MG kit 1 mg as needed for low blood sugar       insulin aspart (NOVOLOG PEN) 100 UNIT/ML pen Inject 1-10 Units Subcutaneous 3 times daily (before meals)       insulin glargine (LANTUS PEN) 100 UNIT/ML pen Inject 15 Units Subcutaneous 2 times daily       INSULIN PUMP - OUTPATIENT Novolog Insulin  BASAL RATES and times:  All day: 0.95 units/hour    CARB RATIO: 1 unit per 15 grams  Correction Factor (Sensitivity): 55mg/dL  BLOOD GLUCOSE TARGET and times:  12   AM (midnight): 100 - 140  5:30     AM:  100 - 120  10   PM:  100 - 140  Active Insulin Time:  5 hours     (Per Rx, pt uses 50-60 units/day)       LOVASTATIN 20 MG PO TABS 1 TABLET DAILY AT DINNER 90 Tab 0     melatonin 3 MG tablet Take 1 tablet (3 mg) by mouth nightly as needed for sleep       metFORMIN (GLUCOPHAGE) 500 MG tablet Take 500 mg by mouth 2 times daily (with meals)       metoprolol tartrate (LOPRESSOR) 25 MG tablet Take 1 tablet (25 mg) by mouth 2 times daily       minocycline (MINOCIN/DYNACIN) 100 MG capsule Take 100 mg by mouth 2 times daily        pantoprazole (PROTONIX) 20 MG EC tablet Take 1 tablet (20 mg) by mouth daily       pioglitazone (ACTOS) 30 MG tablet Take 30 mg by mouth daily       triamcinolone (KENALOG) 0.1 % external ointment Apply topically daily as needed for irritation       verapamil ER (CALAN-SR) 120 MG CR tablet Take 1 tablet (120 mg) by mouth At Bedtime          ROS:  10 point ROS of systems including Constitutional, Eyes, Respiratory, Cardiovascular, Gastroenterology, Genitourinary, Integumentary, Musculoskeletal, Psychiatric were all negative except for pertinent positives noted in my HPI.    Vitals:  /80   Pulse 75   Temp 97.5  F (36.4  C)   Resp 16   Wt 99.2 kg (218 lb 11.2 oz)   SpO2 95%   BMI 31.38 kg/m    Exam:  GENERAL APPEARANCE:  Alert, in no distress  ENT:  Mouth and posterior oropharynx normal, moist mucous membranes, [place]  EYES:  EOM, conjunctivae, lids, pupils and irises normal, PERRL  RESP:  respiratory effort and palpation of chest normal, no respiratory distress, diminished breath sounds bases bilaterally, fine crackles left base noted  CV:  Palpation and auscultation of heart done , regular rate and rhythm, no murmur, rub, or gallop, peripheral edema 1+ in LE bilaterally  ABDOMEN:  normal bowel sounds, soft, nontender, no hepatosplenomegaly or other masses  M/S:   Examination of:   right upper extremity, left upper extremity, right lower extremity and left lower extremity  Inspection, ROM, stability and muscle strength normal  SKIN:  Inspection of skin and subcutaneous tissue baseline, ecchymosis healing left flank area  NEURO:   Cranial nerves 2-12 are normal tested and grossly at patient's baseline, speech WNL  PSYCH:  affect and mood normal    Lab/Diagnostic data:  Recent labs in [facility] reviewed by me today.     ASSESSMENT/PLAN:  Hemothorax on left  Closed traumatic minimally displaced fracture of multiple ribs of left side with routine healing, subsequent encounter  Physical deconditioning  Anemia due to blood loss, acute  Acute/ongoing: PT and OT for strengthening, monitor SaO2 at rest and with activity, tylenol 650mg q 4 hours prn, CBC on Friday    Persistent atrial fibrillation  Essential hypertension  Acute/ongoing: unstable : vitals daily and prn, CMP on Friday, eliquis 5mg BID, metoprolol 25mg BID, verapamil 120mg QD, clonidine  0.3mg BID  F/u with cardiology Dr. Salinas in one week    Type 2 diabetes mellitus with other circulatory complications (H)  Acute/ongoing: hypoglycemia over past 24 hours:  Blood sugar monitoring QID and prn, continue actos 30mg QD,  Metformin 500mg BID, decrease lantus to 10 units SQ BID will transition back to insulin pump once blood sugars are stable    Acute kidney injury (H)  Other hypervolemia  Acute/unstable: CMP on friday, continue lasix 40mg QD for one week    Transaminitis  Acute: CMP on Friday    Delirium  Alcoholism /alcohol abuse (H)  Cognitive impairment  Acute/ongoing: OT evaluation, monitor       Orders written by provider at facility  CBC and CMP on Friday  Decrease lantus to 10 units SQ BID  Make appt with cardiology Dr. Salinas    Total time spent with patient visit at the [facility] nursing [facility] was 45 min including patient visit and review of past records. Greater than 50% of total time spent with counseling and coordinating care due to DIscussed pain medication and pain control, discussed diabetic management and monitoring blood sugars decrease lantus at this time will transition back to insulin pump once blood sugars stable, discussed therapy and POC, no other concerns noted.     Electronically signed by:  Tonya Lynn Haase, APRN CNP

## 2019-11-15 ENCOUNTER — PRE VISIT (OUTPATIENT)
Dept: CARDIOLOGY | Facility: CLINIC | Age: 79
End: 2019-11-15

## 2019-11-15 ENCOUNTER — TRANSFERRED RECORDS (OUTPATIENT)
Dept: HEALTH INFORMATION MANAGEMENT | Facility: CLINIC | Age: 79
End: 2019-11-15

## 2019-11-15 LAB
ANION GAP SERPL CALCULATED.3IONS-SCNC: 11 MMOL/L (ref 7–16)
BUN SERPL-MCNC: 16 MG/DL (ref 7–26)
CALCIUM SERPL-MCNC: 8.4 MG/DL (ref 8.4–10.4)
CHLORIDE SERPLBLD-SCNC: 103 MMOL/L (ref 98–109)
CO2 SERPL-SCNC: 30 MMOL/L (ref 20–29)
CREAT SERPL-MCNC: 1.35 MG/DL (ref 0.73–1.18)
DIFFERENTIAL: ABNORMAL
ERYTHROCYTE [DISTWIDTH] IN BLOOD BY AUTOMATED COUNT: 20 % (ref 11.9–15.5)
GFR SERPL CREATININE-BSD FRML MDRD: 49 ML/MIN/1.73M2
GLUCOSE SERPL-MCNC: 53 MG/DL (ref 70–100)
HCT VFR BLD AUTO: 30.8 % (ref 38.8–50)
HEMOGLOBIN: 9.3 G/DL (ref 13.5–17.5)
MCH RBC QN AUTO: 29.5 PG (ref 27.6–33.3)
MCHC RBC AUTO-ENTMCNC: 30.2 G/DL (ref 31.5–35.2)
MCV RBC AUTO: 97.8 FL (ref 80–100)
PLATELET # BLD AUTO: 421 X10(9)/L (ref 150–450)
POTASSIUM SERPL-SCNC: 3.3 MMOL/L (ref 3.5–5.1)
RBC # BLD AUTO: 3.15 X10(12)/L (ref 4.32–5.72)
SODIUM SERPL-SCNC: 144 MMOL/L (ref 136–145)
WBC # BLD AUTO: 13.2 X10(9)/L (ref 3.5–10.5)

## 2019-11-18 ENCOUNTER — TRANSFERRED RECORDS (OUTPATIENT)
Dept: HEALTH INFORMATION MANAGEMENT | Facility: CLINIC | Age: 79
End: 2019-11-18

## 2019-11-18 VITALS
TEMPERATURE: 96.6 F | RESPIRATION RATE: 16 BRPM | DIASTOLIC BLOOD PRESSURE: 80 MMHG | HEART RATE: 75 BPM | WEIGHT: 213 LBS | SYSTOLIC BLOOD PRESSURE: 156 MMHG | OXYGEN SATURATION: 97 % | BODY MASS INDEX: 30.56 KG/M2

## 2019-11-18 LAB
ALBUMIN SERPL-MCNC: 3.1 G/DL (ref 3.5–5)
ALP SERPL-CCNC: 214 U/L (ref 40–150)
ALT SERPL-CCNC: 40 U/L (ref 0–55)
ANION GAP SERPL CALCULATED.3IONS-SCNC: 16 MMOL/L (ref 7–16)
AST SERPL-CCNC: 30 U/L (ref 10–40)
BILIRUB SERPL-MCNC: 1 MG/DL (ref 0.2–1.2)
BUN SERPL-MCNC: 20 MG/DL (ref 7–26)
CALCIUM SERPL-MCNC: 9 MG/DL (ref 8.4–10.4)
CHLORIDE SERPLBLD-SCNC: 102 MMOL/L (ref 98–109)
CO2 SERPL-SCNC: 25 MMOL/L (ref 20–29)
CREAT SERPL-MCNC: 1.31 MG/DL (ref 0.73–1.18)
GFR SERPL CREATININE-BSD FRML MDRD: 51 ML/MIN/1.73M2
GLUCOSE SERPL-MCNC: 341 MG/DL (ref 70–100)
POTASSIUM SERPL-SCNC: 4.3 MMOL/L (ref 3.5–5.1)
PROT SERPL-MCNC: 5.3 G/DL (ref 6.4–8.3)
SODIUM SERPL-SCNC: 143 MMOL/L (ref 136–145)

## 2019-11-18 RX ORDER — TRAZODONE HYDROCHLORIDE 50 MG/1
25 TABLET, FILM COATED ORAL AT BEDTIME
Status: ON HOLD | COMMUNITY
End: 2019-12-30

## 2019-11-18 RX ORDER — POTASSIUM CHLORIDE 1500 MG/1
20 TABLET, EXTENDED RELEASE ORAL DAILY
COMMUNITY
End: 2019-11-19

## 2019-11-19 ENCOUNTER — NURSING HOME VISIT (OUTPATIENT)
Dept: GERIATRICS | Facility: CLINIC | Age: 79
End: 2019-11-19
Payer: COMMERCIAL

## 2019-11-19 DIAGNOSIS — N17.9 ACUTE KIDNEY INJURY (H): ICD-10-CM

## 2019-11-19 DIAGNOSIS — E11.59 TYPE 2 DIABETES MELLITUS WITH OTHER CIRCULATORY COMPLICATIONS (H): ICD-10-CM

## 2019-11-19 DIAGNOSIS — E87.79 OTHER HYPERVOLEMIA: ICD-10-CM

## 2019-11-19 DIAGNOSIS — R53.81 PHYSICAL DECONDITIONING: ICD-10-CM

## 2019-11-19 DIAGNOSIS — D62 ANEMIA DUE TO BLOOD LOSS, ACUTE: ICD-10-CM

## 2019-11-19 DIAGNOSIS — S22.42XD: ICD-10-CM

## 2019-11-19 DIAGNOSIS — J94.2 HEMOTHORAX ON LEFT: Primary | ICD-10-CM

## 2019-11-19 DIAGNOSIS — I10 ESSENTIAL HYPERTENSION: ICD-10-CM

## 2019-11-19 DIAGNOSIS — I48.19 PERSISTENT ATRIAL FIBRILLATION (H): ICD-10-CM

## 2019-11-19 DIAGNOSIS — R74.01 TRANSAMINITIS: ICD-10-CM

## 2019-11-19 PROCEDURE — 99310 SBSQ NF CARE HIGH MDM 45: CPT | Performed by: NURSE PRACTITIONER

## 2019-11-19 NOTE — PROGRESS NOTES
[place] [facility]  [place] Medical Record Number:  [id number]  Place of Service where encounter took place:  [facility] ([facility]) [[id number]]  Chief Complaint   Patient presents with     RECHECK       HPI:    Tc Garcia  is a 79 year old (1940), who is being seen today for an episodic care visit.  HPI information obtained from: facility chart records, facility staff, patient report and [facility] chart review. Today's concern is:  Hemothorax/left rib Fx: on exam today patient denies pain or discomfort, he is walking with therapy 150 feet, he is indep with ADL's and walking in room.   Anemia:   HTN: /80, 161/86, 154/84 with HR in 70's  DMII: blood sugars , 315, 332, noon 205, 301, pm 247, 243 and hs 284, 243  Hypovolemia: admit weight was 221.4 and current weight is 212.8lbs patient still has a lot of LE edema, denies SOB, cough, congestion  Transaminitis: see labs    Past Medical and Surgical History reviewed in Epic today.    MEDICATIONS:  Current Outpatient Medications   Medication Sig Dispense Refill     ACCU-CHEK COMPACT TEST DRUM VI STRP check blood sugar three times a day and  10     apixaban ANTICOAGULANT (ELIQUIS) 5 MG tablet Take 1 tablet (5 mg) by mouth 2 times daily       cloNIDine (CATAPRES) 0.3 MG tablet Take 0.3 mg by mouth 2 times daily       furosemide (LASIX) 40 MG tablet Take 1 tablet (40 mg) by mouth daily       glucagon 1 MG kit 1 mg as needed for low blood sugar       insulin aspart (NOVOLOG PEN) 100 UNIT/ML pen Inject as per sliding scale:if 200 - 224 = 1 unit;225 - 249 = 2 units;250 - 274 = 3 units ;275 - 299 = 4 units;300 - 324 = 5 units ;325 - 349 = 6 units ;350+ = 7 units ,subcutaneously before meals for DM2       insulin glargine (LANTUS PEN) 100 UNIT/ML pen Inject 6 unit subcutaneously two times a day for DM2 HBA1c goal <7%       INSULIN PUMP - OUTPATIENT Novolog Insulin  BASAL RATES and times:  All day: 0.95 units/hour    CARB RATIO: 1 unit  per 15 grams  Correction Factor (Sensitivity): 55mg/dL  BLOOD GLUCOSE TARGET and times:  12   AM (midnight): 100 - 140  5:30     AM:  100 - 120  10   PM:  100 - 140  Active Insulin Time:  5 hours     (Per Rx, pt uses 50-60 units/day)       LOVASTATIN 20 MG PO TABS 1 TABLET DAILY AT DINNER 90 Tab 0     melatonin 3 MG tablet Take 1 tablet (3 mg) by mouth nightly as needed for sleep       metFORMIN (GLUCOPHAGE) 500 MG tablet Take 500 mg by mouth 2 times daily (with meals)       metoprolol tartrate (LOPRESSOR) 25 MG tablet Take 1 tablet (25 mg) by mouth 2 times daily       minocycline (MINOCIN/DYNACIN) 100 MG capsule Take 100 mg by mouth 2 times daily        pantoprazole (PROTONIX) 20 MG EC tablet Take 1 tablet (20 mg) by mouth daily       pioglitazone (ACTOS) 30 MG tablet Take 30 mg by mouth daily       potassium chloride ER (K-TAB) 20 MEQ CR tablet Take 20 mEq by mouth daily       traZODone (DESYREL) 50 MG tablet Take 25 mg by mouth At Bedtime       triamcinolone (KENALOG) 0.1 % external ointment Apply topically daily as needed for irritation       verapamil ER (CALAN-SR) 120 MG CR tablet Take 1 tablet (120 mg) by mouth At Bedtime       insulin aspart (NOVOLOG PEN) 100 UNIT/ML pen Inject 1-10 Units Subcutaneous 3 times daily (before meals) (Patient not taking: Reported on 11/18/2019)       insulin glargine (LANTUS PEN) 100 UNIT/ML pen Inject 15 Units Subcutaneous 2 times daily (Patient not taking: Reported on 11/18/2019)           REVIEW OF SYSTEMS:  10 point ROS of systems including Constitutional, Eyes, Respiratory, Cardiovascular, Gastroenterology, Genitourinary, Integumentary, Musculoskeletal, Psychiatric were all negative except for pertinent positives noted in my HPI.    Objective:  BP (!) 156/80   Pulse 75   Temp 96.6  F (35.9  C)   Resp 16   Wt 96.6 kg (213 lb)   SpO2 97%   BMI 30.56 kg/m    Exam:  Exam:  GENERAL APPEARANCE:  Alert, in no distress  ENT:  Mouth and posterior oropharynx normal, moist  mucous membranes, [place]  EYES:  EOM, conjunctivae, lids, pupils and irises normal, PERRL  RESP:  respiratory effort and palpation of chest normal, no respiratory distress, diminished breath sounds bases bilaterally, no adventitious sounds appreciated  CV:  Palpation and auscultation of heart done , regular rate and rhythm, no murmur, rub, or gallop, peripheral edema 1+ in LE bilaterally  ABDOMEN:  normal bowel sounds, soft, nontender, no hepatosplenomegaly or other masses  M/S:   Examination of:   right upper extremity, left upper extremity, right lower extremity and left lower extremity  Inspection, ROM, stability and muscle strength normal  SKIN:  Inspection of skin and subcutaneous tissue baseline, ecchymosis healing left flank area  NEURO:   Cranial nerves 2-12 are normal tested and grossly at patient's baseline, speech WNL  PSYCH:  affect and mood normal    Labs:   Recent labs in [facility] reviewed by me today.       ASSESSMENT/PLAN:  Hemothorax on left  Closed traumatic minimally displaced fracture of multiple ribs of left side with routine healing, subsequent encounter  Physical deconditioning  Anemia due to blood loss, acute  Acute/ongoing: PT and OT for strengthening, monitor SaO2 at rest and with activity, tylenol 650mg q 4 hours prn, CBC stable     Persistent atrial fibrillation  Essential hypertension  Acute/ongoing: unstable : vitals daily and prn, CMP on Friday, eliquis 5mg BID, increase metoprolol to  37.5mg BID, verapamil 120mg QD, clonidine 0.3mg BID  F/u with cardiology Dr. Salinas as directed     Type 2 diabetes mellitus with other circulatory complications (H)  Acute/ongoing: continue Blood sugar monitoring QID and prn, continue actos 30mg QD,  Metformin 500mg BID, start humalog insulin pump      Acute kidney injury (H)  Other hypervolemia  Acute/unstable: CMP stable decrease lasix to 20mg QD ongoing, DC KCL, weight trending down, creat stable     Transaminitis  Acute: CMP follow, alk phos elevated  trending down other labs WNL     Delirium  Alcoholism /alcohol abuse (H)  Cognitive impairment  Acute/ongoing: BIms 14/15 ad SBT 2/28 monitor    Orders written by provider at facility  Increase metoprolol to 37.5mg BID  Contact endocrinology to restart insulin pump  Decrease lasix to 20mg QD  DC KCL    Total time spent with patient visit at the [facility] was 45 min including patient visit and review of past records. Greater than 50% of total time spent with counseling and coordinating care due to discussed insulin pump management, current blood sugars, will contact endocrinologist to transition back to insulin pump, discussed LE edema and fluid overload, weight trending down, will continue lasix until LE edema is resolved. .  Electronically signed by:  Tonya Lynn Haase, APRN CNP

## 2019-11-19 NOTE — LETTER
11/19/2019        RE: Tc Garcia  [address] [address] [address]        [place] [facility]  [place] Medical Record Number:  [id number]  Place of Service where encounter took place:  [facility] ([facility]) [[id number]]  Chief Complaint   Patient presents with     RECHECK       HPI:    Tc Garcia  is a 79 year old (1940), who is being seen today for an episodic care visit.  HPI information obtained from: facility chart records, facility staff, patient report and [facility] chart review. Today's concern is:  Hemothorax/left rib Fx: on exam today patient denies pain or discomfort, he is walking with therapy 150 feet, he is indep with ADL's and walking in room.   Anemia:   HTN: /80, 161/86, 154/84 with HR in 70's  DMII: blood sugars , 315, 332, noon 205, 301, pm 247, 243 and hs 284, 243  Hypovolemia: admit weight was 221.4 and current weight is 212.8lbs patient still has a lot of LE edema, denies SOB, cough, congestion  Transaminitis: see labs    Past Medical and Surgical History reviewed in Epic today.    MEDICATIONS:  Current Outpatient Medications   Medication Sig Dispense Refill     ACCU-CHEK COMPACT TEST DRUM VI STRP check blood sugar three times a day and  10     apixaban ANTICOAGULANT (ELIQUIS) 5 MG tablet Take 1 tablet (5 mg) by mouth 2 times daily       cloNIDine (CATAPRES) 0.3 MG tablet Take 0.3 mg by mouth 2 times daily       furosemide (LASIX) 40 MG tablet Take 1 tablet (40 mg) by mouth daily       glucagon 1 MG kit 1 mg as needed for low blood sugar       insulin aspart (NOVOLOG PEN) 100 UNIT/ML pen Inject as per sliding scale:if 200 - 224 = 1 unit;225 - 249 = 2 units;250 - 274 = 3 units ;275 - 299 = 4 units;300 - 324 = 5 units ;325 - 349 = 6 units ;350+ = 7 units ,subcutaneously before meals for DM2       insulin glargine (LANTUS PEN) 100 UNIT/ML pen Inject 6 unit subcutaneously two times a day for DM2 HBA1c goal <7%       INSULIN PUMP -  OUTPATIENT Novolog Insulin  BASAL RATES and times:  All day: 0.95 units/hour    CARB RATIO: 1 unit per 15 grams  Correction Factor (Sensitivity): 55mg/dL  BLOOD GLUCOSE TARGET and times:  12   AM (midnight): 100 - 140  5:30     AM:  100 - 120  10   PM:  100 - 140  Active Insulin Time:  5 hours     (Per Rx, pt uses 50-60 units/day)       LOVASTATIN 20 MG PO TABS 1 TABLET DAILY AT DINNER 90 Tab 0     melatonin 3 MG tablet Take 1 tablet (3 mg) by mouth nightly as needed for sleep       metFORMIN (GLUCOPHAGE) 500 MG tablet Take 500 mg by mouth 2 times daily (with meals)       metoprolol tartrate (LOPRESSOR) 25 MG tablet Take 1 tablet (25 mg) by mouth 2 times daily       minocycline (MINOCIN/DYNACIN) 100 MG capsule Take 100 mg by mouth 2 times daily        pantoprazole (PROTONIX) 20 MG EC tablet Take 1 tablet (20 mg) by mouth daily       pioglitazone (ACTOS) 30 MG tablet Take 30 mg by mouth daily       potassium chloride ER (K-TAB) 20 MEQ CR tablet Take 20 mEq by mouth daily       traZODone (DESYREL) 50 MG tablet Take 25 mg by mouth At Bedtime       triamcinolone (KENALOG) 0.1 % external ointment Apply topically daily as needed for irritation       verapamil ER (CALAN-SR) 120 MG CR tablet Take 1 tablet (120 mg) by mouth At Bedtime       insulin aspart (NOVOLOG PEN) 100 UNIT/ML pen Inject 1-10 Units Subcutaneous 3 times daily (before meals) (Patient not taking: Reported on 11/18/2019)       insulin glargine (LANTUS PEN) 100 UNIT/ML pen Inject 15 Units Subcutaneous 2 times daily (Patient not taking: Reported on 11/18/2019)           REVIEW OF SYSTEMS:  10 point ROS of systems including Constitutional, Eyes, Respiratory, Cardiovascular, Gastroenterology, Genitourinary, Integumentary, Musculoskeletal, Psychiatric were all negative except for pertinent positives noted in my HPI.    Objective:  BP (!) 156/80   Pulse 75   Temp 96.6  F (35.9  C)   Resp 16   Wt 96.6 kg (213 lb)   SpO2 97%   BMI 30.56 kg/m      Exam:  Exam:  GENERAL APPEARANCE:  Alert, in no distress  ENT:  Mouth and posterior oropharynx normal, moist mucous membranes, [place]  EYES:  EOM, conjunctivae, lids, pupils and irises normal, PERRL  RESP:  respiratory effort and palpation of chest normal, no respiratory distress, diminished breath sounds bases bilaterally, no adventitious sounds appreciated  CV:  Palpation and auscultation of heart done , regular rate and rhythm, no murmur, rub, or gallop, peripheral edema 1+ in LE bilaterally  ABDOMEN:  normal bowel sounds, soft, nontender, no hepatosplenomegaly or other masses  M/S:   Examination of:   right upper extremity, left upper extremity, right lower extremity and left lower extremity  Inspection, ROM, stability and muscle strength normal  SKIN:  Inspection of skin and subcutaneous tissue baseline, ecchymosis healing left flank area  NEURO:   Cranial nerves 2-12 are normal tested and grossly at patient's baseline, speech WNL  PSYCH:  affect and mood normal    Labs:   Recent labs in [facility] reviewed by me today.       ASSESSMENT/PLAN:  Hemothorax on left  Closed traumatic minimally displaced fracture of multiple ribs of left side with routine healing, subsequent encounter  Physical deconditioning  Anemia due to blood loss, acute  Acute/ongoing: PT and OT for strengthening, monitor SaO2 at rest and with activity, tylenol 650mg q 4 hours prn, CBC stable     Persistent atrial fibrillation  Essential hypertension  Acute/ongoing: unstable : vitals daily and prn, CMP on Friday, eliquis 5mg BID, increase metoprolol to  37.5mg BID, verapamil 120mg QD, clonidine 0.3mg BID  F/u with cardiology Dr. Salinas as directed     Type 2 diabetes mellitus with other circulatory complications (H)  Acute/ongoing: continue Blood sugar monitoring QID and prn, continue actos 30mg QD,  Metformin 500mg BID, start humalog insulin pump      Acute kidney injury (H)  Other hypervolemia  Acute/unstable: CMP stable decrease lasix to 20mg QD  ongoing, DC KCL, weight trending down, creat stable     Transaminitis  Acute: CMP follow, alk phos elevated trending down other labs WNL     Delirium  Alcoholism /alcohol abuse (H)  Cognitive impairment  Acute/ongoing: BIms 14/15 ad SBT 2/28 monitor    Orders written by provider at facility  Increase metoprolol to 37.5mg BID  Contact endocrinology to restart insulin pump  Decrease lasix to 20mg QD  DC KCL    Total time spent with patient visit at the [facility] nursing [facility] was 45 min including patient visit and review of past records. Greater than 50% of total time spent with counseling and coordinating care due to discussed insulin pump management, current blood sugars, will contact endocrinologist to transition back to insulin pump, discussed LE edema and fluid overload, weight trending down, will continue lasix until LE edema is resolved. .  Electronically signed by:  Tonya Lynn Haase, APRN CNP               Sincerely,        Tonya Lynn Haase, APRN CNP

## 2019-11-20 VITALS
SYSTOLIC BLOOD PRESSURE: 165 MMHG | HEART RATE: 67 BPM | BODY MASS INDEX: 30.32 KG/M2 | WEIGHT: 211.3 LBS | OXYGEN SATURATION: 94 % | RESPIRATION RATE: 18 BRPM | DIASTOLIC BLOOD PRESSURE: 80 MMHG | TEMPERATURE: 97 F

## 2019-11-20 RX ORDER — FUROSEMIDE 20 MG
20 TABLET ORAL DAILY
COMMUNITY
End: 2019-11-22

## 2019-11-20 RX ORDER — METOPROLOL TARTRATE 37.5 MG/1
37.5 TABLET, FILM COATED ORAL 2 TIMES DAILY
Status: ON HOLD | COMMUNITY
End: 2019-12-30

## 2019-11-21 ENCOUNTER — MEDICAL CORRESPONDENCE (OUTPATIENT)
Dept: HEALTH INFORMATION MANAGEMENT | Facility: CLINIC | Age: 79
End: 2019-11-21

## 2019-11-21 ENCOUNTER — DISCHARGE SUMMARY NURSING HOME (OUTPATIENT)
Dept: GERIATRICS | Facility: CLINIC | Age: 79
End: 2019-11-21
Payer: COMMERCIAL

## 2019-11-21 ENCOUNTER — OFFICE VISIT (OUTPATIENT)
Dept: CARDIOLOGY | Facility: CLINIC | Age: 79
End: 2019-11-21
Payer: COMMERCIAL

## 2019-11-21 ENCOUNTER — HOSPITAL ENCOUNTER (OUTPATIENT)
Dept: CARDIOLOGY | Facility: CLINIC | Age: 79
Discharge: HOME | End: 2019-11-21
Attending: INTERNAL MEDICINE | Admitting: INTERNAL MEDICINE
Payer: COMMERCIAL

## 2019-11-21 ENCOUNTER — TRANSFERRED RECORDS (OUTPATIENT)
Dept: HEALTH INFORMATION MANAGEMENT | Facility: CLINIC | Age: 79
End: 2019-11-21

## 2019-11-21 VITALS
HEIGHT: 70 IN | HEART RATE: 68 BPM | DIASTOLIC BLOOD PRESSURE: 84 MMHG | OXYGEN SATURATION: 97 % | WEIGHT: 215 LBS | SYSTOLIC BLOOD PRESSURE: 135 MMHG | BODY MASS INDEX: 30.78 KG/M2

## 2019-11-21 DIAGNOSIS — I51.7 LVH (LEFT VENTRICULAR HYPERTROPHY): ICD-10-CM

## 2019-11-21 DIAGNOSIS — R31.0 GROSS HEMATURIA: Primary | ICD-10-CM

## 2019-11-21 DIAGNOSIS — I48.91 ATRIAL FIBRILLATION, UNSPECIFIED TYPE (H): Primary | ICD-10-CM

## 2019-11-21 DIAGNOSIS — S22.42XD: ICD-10-CM

## 2019-11-21 DIAGNOSIS — I49.8 BRADYARRHYTHMIA: ICD-10-CM

## 2019-11-21 DIAGNOSIS — D62 ANEMIA DUE TO BLOOD LOSS, ACUTE: ICD-10-CM

## 2019-11-21 DIAGNOSIS — E11.59 TYPE 2 DIABETES MELLITUS WITH OTHER CIRCULATORY COMPLICATIONS (H): ICD-10-CM

## 2019-11-21 DIAGNOSIS — I48.91 ATRIAL FIBRILLATION, UNSPECIFIED TYPE (H): ICD-10-CM

## 2019-11-21 DIAGNOSIS — I10 ESSENTIAL HYPERTENSION: ICD-10-CM

## 2019-11-21 DIAGNOSIS — J94.2 HEMOTHORAX ON LEFT: Primary | ICD-10-CM

## 2019-11-21 DIAGNOSIS — R53.81 PHYSICAL DECONDITIONING: ICD-10-CM

## 2019-11-21 DIAGNOSIS — I48.19 PERSISTENT ATRIAL FIBRILLATION (H): ICD-10-CM

## 2019-11-21 LAB
ANION GAP SERPL CALCULATED.3IONS-SCNC: 10 MMOL/L (ref 7–16)
BUN SERPL-MCNC: 16 MG/DL (ref 7–26)
CALCIUM SERPL-MCNC: 9 MG/DL (ref 8.4–10.4)
CHLORIDE SERPLBLD-SCNC: 102 MMOL/L (ref 98–109)
CO2 SERPL-SCNC: 31 MMOL/L (ref 20–29)
CREAT SERPL-MCNC: 1.26 MG/DL (ref 0.73–1.18)
GFR SERPL CREATININE-BSD FRML MDRD: 54 ML/MIN/1.73M2
GLUCOSE SERPL-MCNC: 154 MG/DL (ref 70–100)
POTASSIUM SERPL-SCNC: 3.3 MMOL/L (ref 3.5–5.1)
SODIUM SERPL-SCNC: 143 MMOL/L (ref 136–145)

## 2019-11-21 PROCEDURE — 0298T ZIO PATCH HOLTER ADULT PEDIATRIC GREATER THAN 48 HRS: CPT | Performed by: INTERNAL MEDICINE

## 2019-11-21 PROCEDURE — 99316 NF DSCHRG MGMT 30 MIN+: CPT | Performed by: NURSE PRACTITIONER

## 2019-11-21 PROCEDURE — 0296T ZIO PATCH HOLTER ADULT PEDIATRIC GREATER THAN 48 HRS: CPT

## 2019-11-21 PROCEDURE — 99214 OFFICE O/P EST MOD 30 MIN: CPT | Performed by: INTERNAL MEDICINE

## 2019-11-21 ASSESSMENT — MIFFLIN-ST. JEOR: SCORE: 1691.48

## 2019-11-21 NOTE — PROGRESS NOTES
HPI and Plan:   See dictation    Orders Placed This Encounter   Procedures     Follow-Up with Electrophysiologist     Deepthi Joyce [facility] Greater than 48 hrs       No orders of the defined types were placed in this encounter.      Medications Discontinued During This Encounter   Medication Reason     furosemide (LASIX) 40 MG tablet Dose adjustment     metoprolol tartrate (LOPRESSOR) 25 MG tablet Dose adjustment         Encounter Diagnoses   Name Primary?     Atrial fibrillation, unspecified type (H) Yes     Bradyarrhythmia      LVH (left ventricular hypertrophy)        CURRENT MEDICATIONS:  Current Outpatient Medications   Medication Sig Dispense Refill     ACCU-CHEK COMPACT TEST DRUM VI STRP check blood sugar three times a day and  10     apixaban ANTICOAGULANT (ELIQUIS) 5 MG tablet Take 1 tablet (5 mg) by mouth 2 times daily       cloNIDine (CATAPRES) 0.3 MG tablet Take 0.3 mg by mouth 2 times daily       furosemide (LASIX) 20 MG tablet Take 20 mg by mouth daily       glucagon 1 MG kit 1 mg as needed for low blood sugar       insulin aspart (NOVOLOG PEN) 100 UNIT/ML pen Inject as per sliding scale:if 200 - 224 = 1 unit;225 - 249 = 2 units;250 - 274 = 3 units ;275 - 299 = 4 units;300 - 324 = 5 units ;325 - 349 = 6 units ;350+ = 7 units ,subcutaneously before meals for DM2       insulin glargine (LANTUS PEN) 100 UNIT/ML pen Inject 6 unit subcutaneously two times a day for DM2 HBA1c goal <7%       INSULIN PUMP - OUTPATIENT Novolog Insulin  BASAL RATES and times:  All day: 0.95 units/hour    CARB RATIO: 1 unit per 15 grams  Correction Factor (Sensitivity): 55mg/dL  BLOOD GLUCOSE TARGET and times:  12   AM (midnight): 100 - 140  5:30     AM:  100 - 120  10   PM:  100 - 140  Active Insulin Time:  5 hours     (Per Rx, pt uses 50-60 units/day)       LOVASTATIN 20 MG PO TABS 1 TABLET DAILY AT DINNER 90 Tab 0     melatonin 3 MG tablet Take 1 tablet (3 mg) by mouth nightly as needed for sleep        metFORMIN (GLUCOPHAGE) 500 MG tablet Take 500 mg by mouth 2 times daily (with meals)       Metoprolol Tartrate 37.5 MG TABS Take 37.5 mg by mouth 2 times daily       minocycline (MINOCIN/DYNACIN) 100 MG capsule Take 100 mg by mouth 2 times daily        pantoprazole (PROTONIX) 20 MG EC tablet Take 1 tablet (20 mg) by mouth daily       pioglitazone (ACTOS) 30 MG tablet Take 30 mg by mouth daily       traZODone (DESYREL) 50 MG tablet Take 25 mg by mouth At Bedtime       triamcinolone (KENALOG) 0.1 % external ointment Apply topically daily as needed for irritation       verapamil ER (CALAN-SR) 120 MG CR tablet Take 1 tablet (120 mg) by mouth At Bedtime         ALLERGIES     Allergies   Allergen Reactions     No Known Drug Allergies        PAST MEDICAL HISTORY:  Past Medical History:   Diagnosis Date     Diabetic PROTEINURIA      Mixed hyperlipidemia      Personal history of colonic polyps 1972    gets colonoscopy every five years, due in      Rosacea      Type II or unspecified type diabetes mellitus without mention of complication, not stated as uncontrolled      Unspecified essential hypertension        PAST SURGICAL HISTORY:  Past Surgical History:   Procedure Laterality Date     HC REMOVE TONSILS/ADENOIDS,<10 Y/O      T & A <11y.o.       FAMILY HISTORY:  Family History   Problem Relation Age of Onset     Family History Negative Mother          age 71     Family History Negative Father          age 70     Diabetes Brother         alive age 77     Diabetes Brother         alive age 76     Family History Negative Brother              Family History Negative Brother              Diabetes Sister         alive age76     Family History Negative Sister          age 86     Heart Disease Sister              Family History Negative Sister              Family History Negative Sister              Diabetes Sister      Diabetes Sister       Diabetes Brother      Diabetes Brother        SOCIAL HISTORY:  Social History     Socioeconomic History     Marital status:      Spouse name: Lianna     Number of children: 4     Years of education: 16     Highest education level: None   Occupational History     Occupation: "[employer]"     Employer: [employer]    Social Needs     Financial resource strain: None     Food insecurity:     Worry: None     Inability: None     Transportation needs:     Medical: None     Non-medical: None   Tobacco Use     Smoking status: Former Smoker     Packs/day: 0.20     Years: 40.00     Pack years: 8.00     Types: Cigarettes     Last attempt to quit: 2008     Years since quittin.8     Smokeless tobacco: Never Used     Tobacco comment: occasional   Substance and Sexual Activity     Alcohol use: Not Currently     Alcohol/week: 35.0 standard drinks     Drug use: None     Sexual activity: None   Lifestyle     Physical activity:     Days per week: None     Minutes per session: None     Stress: None   Relationships     Social connections:     Talks on phone: None     Gets together: None     Attends Confucianism service: None     Active member of club or organization: None     Attends meetings of clubs or organizations: None     Relationship status: None     Intimate partner violence:     Fear of current or ex partner: None     Emotionally abused: None     Physically abused: None     Forced sexual activity: None   Other Topics Concern     Parent/sibling w/ CABG, MI or angioplasty before 65F 55M? Not Asked   Social History Narrative     None       Review of Systems:  Skin:  Negative bruising     Eyes:  Positive for glasses    ENT:  Negative      Respiratory:  Positive for   LUCIANO   Cardiovascular:  Negative;palpitations;chest pain;syncope or near-syncope;dizziness;lightheadedness;cyanosis Positive for;exercise intolerance;fatigue increased edema, hands and LE  Gastroenterology: Negative      Genitourinary:  Positive  "for urinary frequency    Musculoskeletal:  Positive for   rib pain  Neurologic:  Negative      Psychiatric:  Positive for sleep disturbances    Heme/Lymph/Imm:         Endocrine:  Negative diabetes      Physical Exam:  Vitals: /84 (BP Location: Right arm, Cuff Size: Adult Large)   Pulse 68   Ht 1.778 m (5' 10\")   Wt 97.5 kg (215 lb)   SpO2 97%   BMI 30.85 kg/m      Constitutional:  cooperative;in no acute distress        Skin:  warm and dry to the touch          Head:  normocephalic        Eyes:  pupils equal and round        Lymph:      ENT:  dentition good        Neck:  no carotid bruit        Respiratory:  clear to auscultation;normal symmetry diminished breath sounds bilaterally       Cardiac: regular rhythm       systolic ejection murmur;RUSB          pulses below the femoral arteries are diminished                                      GI:  abdomen soft        Extremities and Muscular Skeletal:      bilateral LE edema;1+          Neurological:  no gross motor deficits        Psych:  Alert and Oriented x 3          CC  Senthil Moya MD  [address]  [place] [address]                  "

## 2019-11-21 NOTE — PROGRESS NOTES
Service Date: 11/21/2019      REFERRING PROVIDER:  Dr. Senthil Moya.      HISTORY OF PRESENT ILLNESS:  Mr. Garcia is a pleasant 79-year-old gentleman who had a recent prolonged hospitalization at the end of October, just discharged on 11/10.  He sustained a very complicated mechanical fall with C3 spinous process fracture, T2 fracture, extensive ecchymosis left rib fracture associated with a hemothorax.  This was all complicated by acute renal failure, acute liver failure, possible aspiration pneumonia, anemia, delirium related to alcohol abuse.  He developed atrial fibrillation while he was in the hospital; however, this had been diagnosed apparently 2 months prior by his primary and he was placed on Eliquis.  Eliquis was disrupted during his hospitalization, but he is now back on the medication.  He did have an echocardiogram during his hospitalization that demonstrated evidence of hypertensive heart disease with moderate LVH.  His LV systolic function was preserved with an EF estimated at 55%-60%.  He had moderate to severely dilated atria, mild to moderate tricuspid insufficiency with normal pulmonary pressures estimated.  During his hospitalization, he had both a junctional and bradyarrhythmia as well as evidence of atrial flutter.  He was placed on verapamil for heart rate control.  He is in [place] for rehab and is expecting to be discharged either today or tomorrow.  He is working with physical therapy since his hospitalization.  He has shortness of breath with minimal exertion which is gradually improving.  He denies any active chest discomfort or lightheadedness or presyncope or syncopal events.  His last blood work suggests continued anemia with a hemoglobin of 9.3 on 11/15, continued renal insufficiency with a creatinine of 1.31 on 11/18 with normal electrolytes. Since discharge, again he is back on Eliquis and he denies any bleeding complications, blood in his urine or stool or new bruising since  his fall.      PHYSICAL EXAMINATION:   VITAL SIGNS:  On exam his blood pressure is 135/84, pulse is 68, weight 215 with a body mass index of 30.   NECK:  Carotid upstrokes were diminished, but I did not appreciate bruit.   CARDIOVASCULAR:  Tones are regular with occasional ectopy, suggesting a normal sinus rhythm today.   LUNGS:  Clear.     EXTREMITIES:  He does have continued swelling of the lower extremities and his left hand.      ASSESSMENT AND PLAN:  In summary, Mr. Phillips is a pleasant 79-year-old male with a very complicated mechanical fall and prolonged hospitalization with multisystem organ disease.  He had evidence of hypertensive heart disease on echocardiogram and atrial arrhythmias as well as bradyarrhythmias during his hospitalization.  He is back on Eliquis for CVA prophylaxis and he is on verapamil for heart rate control.  Given both evidence of bradyarrhythmia and tachyarrhythmia, I would like him to wear a ZIO Patch monitor to assess if he has good heart rate control now that he is being discharged from the rehab and I will have him follow up with Dr. Camejo who he saw in the hospital.  Please feel free to contact me with any questions you have in regards to his care.      cc:   Senthil Moya MD   [facility]    [address]    [address] [place] [address]         DALE CHAVEZ DO             D: 2019   T: 2019   MT: ROMERO      Name:     CEDRICK PHILLIPS   MRN:      [id number]        Account:      [id number]   :      1940           Service Date: 2019      Document: [id number]

## 2019-11-21 NOTE — PROGRESS NOTES
[facility] DISCHARGE SUMMARY  PATIENT'S NAME: Tc Garcia  YOB: 1940  MEDICAL RECORD NUMBER:  [id number]  Place of Service where encounter took place:  [facility] ([facility]) [[id number]]    PRIMARY CARE PROVIDER AND CLINIC RESPONSIBLE AFTER TRANSFER:   Senthil Moya MD, [address] / [place] [address]    G Provider     Transferring providers: Tonya Lynn Haase, OMAR REHMAN, Dr. Blas MD  Recent Hospitalization/ED:  [facility] stay 10/30/19 to 11/10/19.  Date of SNF Admission: November / 10 / 2019  Date of SNF (anticipated) Discharge: November / 22 / 2019  Discharged to: previous independent home  Cognitive Scores:BIMS: 14/15, SBT: 2/28   Physical Function: Ambulating > 500 ft with RW indep  DME: Walker    CODE STATUS/ADVANCE DIRECTIVES DISCUSSION:  Full Code   ALLERGIES: No known drug allergies    DISCHARGE DIAGNOSIS/NURSING FACILITY COURSE:   Patient progressed in therapy to walkin g> 500 feet using a RW indep, indep with ADL's, patient will DC home with home PT, RN and HHA through [facility].     Past Medical History:  has a past medical history of Diabetic PROTEINURIA (2003), Mixed hyperlipidemia (2003), Personal history of colonic polyps (1972), Rosacea (1989), Type II or unspecified type diabetes mellitus without mention of complication, not stated as uncontrolled (1999), and Unspecified essential hypertension (1994).    Discharge Medications:  Current Outpatient Medications   Medication Sig Dispense Refill     ACCU-CHEK COMPACT TEST DRUM VI STRP check blood sugar three times a day and  10     apixaban ANTICOAGULANT (ELIQUIS) 5 MG tablet Take 1 tablet (5 mg) by mouth 2 times daily       cloNIDine (CATAPRES) 0.3 MG tablet Take 0.3 mg by mouth 2 times daily       furosemide (LASIX) 20 MG tablet Take 20 mg by mouth daily       glucagon 1 MG kit 1 mg as needed for low blood sugar       insulin aspart (NOVOLOG PEN) 100 UNIT/ML pen Inject  as per sliding scale:if 200 - 224 = 1 unit;225 - 249 = 2 units;250 - 274 = 3 units ;275 - 299 = 4 units;300 - 324 = 5 units ;325 - 349 = 6 units ;350+ = 7 units ,subcutaneously before meals for DM2       insulin glargine (LANTUS PEN) 100 UNIT/ML pen Inject 6 unit subcutaneously two times a day for DM2 HBA1c goal <7%       INSULIN PUMP - OUTPATIENT Novolog Insulin  BASAL RATES and times:  All day: 0.95 units/hour    CARB RATIO: 1 unit per 15 grams  Correction Factor (Sensitivity): 55mg/dL  BLOOD GLUCOSE TARGET and times:  12   AM (midnight): 100 - 140  5:30     AM:  100 - 120  10   PM:  100 - 140  Active Insulin Time:  5 hours     (Per Rx, pt uses 50-60 units/day)       LOVASTATIN 20 MG PO TABS 1 TABLET DAILY AT DINNER 90 Tab 0     melatonin 3 MG tablet Take 1 tablet (3 mg) by mouth nightly as needed for sleep       metFORMIN (GLUCOPHAGE) 500 MG tablet Take 500 mg by mouth 2 times daily (with meals)       Metoprolol Tartrate 37.5 MG TABS Take 37.5 mg by mouth 2 times daily       minocycline (MINOCIN/DYNACIN) 100 MG capsule Take 100 mg by mouth 2 times daily        pantoprazole (PROTONIX) 20 MG EC tablet Take 1 tablet (20 mg) by mouth daily       pioglitazone (ACTOS) 30 MG tablet Take 30 mg by mouth daily       traZODone (DESYREL) 50 MG tablet Take 25 mg by mouth At Bedtime       triamcinolone (KENALOG) 0.1 % external ointment Apply topically daily as needed for irritation       verapamil ER (CALAN-SR) 120 MG CR tablet Take 1 tablet (120 mg) by mouth At Bedtime         Medication Changes/Rationale:     Transitioned back to insulin pump, started humalog at 18 units q 24 hour infusion, patient manages at home himself will be in contact with PCP    Controlled medications sent with patient:   not applicable/none     ROS:   10 point ROS of systems including Constitutional, Eyes, Respiratory, Cardiovascular, Gastroenterology, Genitourinary, Integumentary, Musculoskeletal, Psychiatric were all negative except for pertinent  positives noted in my HPI.    Physical Exam:   Vitals: BP (!) 165/80   Pulse 67   Temp 97  F (36.1  C)   Resp 18   Wt 95.8 kg (211 lb 4.8 oz)   SpO2 94%   BMI 30.32 kg/m    BMI= Body mass index is 30.32 kg/m .  Exam:  GENERAL APPEARANCE:  Alert, in no distress  ENT:  Mouth and posterior oropharynx normal, moist mucous membranes, [place]  EYES:  EOM, conjunctivae, lids, pupils and irises normal, PERRL  RESP:  respiratory effort and palpation of chest normal, no respiratory distress, diminished breath sounds bases bilaterally, no adventitious sounds appreciated  CV:  Palpation and auscultation of heart done , regular rate and rhythm, no murmur, rub, or gallop, peripheral edema 1+ in LE bilaterally  ABDOMEN:  normal bowel sounds, soft, nontender, no hepatosplenomegaly or other masses  M/S:   Examination of:   right upper extremity, left upper extremity, right lower extremity and left lower extremity  Inspection, ROM, stability and muscle strength normal  SKIN:  Inspection of skin and subcutaneous tissue baseline, ecchymosis healing left flank area  NEURO:   Cranial nerves 2-12 are normal tested and grossly at patient's baseline, speech WNL  PSYCH:  affect and mood normal     SNF labs: Recent labs in [facility] reviewed by me today.       ASSESSMENT/PLAN:  Hemothorax on left  Closed traumatic minimally displaced fracture of multiple ribs of left side with routine healing, subsequent encounter  Physical deconditioning  Anemia due to blood loss, acute  Acute/ongoing: DC home with home PT, RN and HHA [facility],  tylenol 650mg q 4 hours prn,     Persistent atrial fibrillation  Essential hypertension  Acute/ongoing: continue eliquis 5mg BID, increase metoprolol to  37.5mg BID, verapamil 120mg QD, clonidine 0.3mg BID  Did have appt today with daniel Maria started and will f/u with Dr. Camejo     Type 2 diabetes mellitus with other circulatory complications (H)  Acute/ongoing: continue Blood sugar monitoring prn, continue  actos 30mg QD,  Metformin 500mg BID, started humalog insulin pump at 18units per 24 hours     Acute kidney injury (H)  Other hypervolemia  Acute/unstable: continue lasix to 20mg QD ongoing, KCL 10meq QD weight trending down, creat stable     Transaminitis  Acute: alk phos elevated trending down other labs WNL     Delirium  Alcoholism /alcohol abuse (H)  Cognitive impairment  Acute/ongoing: BIms 14/15 ad SBT 2/28 monitor    DISCHARGE PLAN:    Follow up labs: No labs orders/due    Medical Follow Up:      Follow up with primary care provider in 1 weeks    MTM referral needed and placed by this provider: No    Current [place] scheduled appointments:       Discharge Services: Home Care:  Physical Therapy, Registered Nurse and Home Health Aide    Discharge Instructions Verbalized to Patient at Discharge:     None      TOTAL DISCHARGE TIME:   Greater than 30 minutes  Electronically signed by:  Tonya Lynn Haase, APRN CNP

## 2019-11-21 NOTE — LETTER
11/21/2019        RE: Tc Garcia  [address] [facility] [id number]        [facility] DISCHARGE SUMMARY  PATIENT'S NAME: Tc Garcia  YOB: 1940  MEDICAL RECORD NUMBER:  [id number]  Place of Service where encounter took place:  [facility] [[id number]]    PRIMARY CARE PROVIDER AND CLINIC RESPONSIBLE AFTER TRANSFER:   Senthil Moya MD, [address] / [place] [address]    [facility] [place] [place] Provider     Transferring providers: Tonya Lynn Haase, APRN CNP, Dr. Blas MD  Recent Hospitalization/ED:  [facility] stay 10/30/19 to 11/10/19.  Date of SNF Admission: November / 10 / 2019  Date of SNF (anticipated) Discharge: November / 22 / 2019  Discharged to: previous independent home  Cognitive Scores:BIMS: 14/15, SBT: 2/28   Physical Function: Ambulating > 500 ft with RW indep  DME: Walker    CODE STATUS/ADVANCE DIRECTIVES DISCUSSION:  Full Code   ALLERGIES: No known drug allergies    DISCHARGE DIAGNOSIS/NURSING FACILITY COURSE:   Patient progressed in therapy to walkin g> 500 feet using a RW indep, indep with ADL's, patient will DC home with home PT, RN and HHA through [facility].     Past Medical History:  has a past medical history of Diabetic PROTEINURIA (2003), Mixed hyperlipidemia (2003), Personal history of colonic polyps (1972), Rosacea (1989), Type II or unspecified type diabetes mellitus without mention of complication, not stated as uncontrolled (1999), and Unspecified essential hypertension (1994).    Discharge Medications:  Current Outpatient Medications   Medication Sig Dispense Refill     ACCU-CHEK COMPACT TEST DRUM VI STRP check blood sugar three times a day and  10     apixaban ANTICOAGULANT (ELIQUIS) 5 MG tablet Take 1 tablet (5 mg) by mouth 2 times daily       cloNIDine (CATAPRES) 0.3 MG tablet Take 0.3 mg by mouth 2 times daily       furosemide (LASIX) 20 MG tablet Take 20 mg by mouth daily       glucagon 1 MG kit 1  mg as needed for low blood sugar       insulin aspart (NOVOLOG PEN) 100 UNIT/ML pen Inject as per sliding scale:if 200 - 224 = 1 unit;225 - 249 = 2 units;250 - 274 = 3 units ;275 - 299 = 4 units;300 - 324 = 5 units ;325 - 349 = 6 units ;350+ = 7 units ,subcutaneously before meals for DM2       insulin glargine (LANTUS PEN) 100 UNIT/ML pen Inject 6 unit subcutaneously two times a day for DM2 HBA1c goal <7%       INSULIN PUMP - OUTPATIENT Novolog Insulin  BASAL RATES and times:  All day: 0.95 units/hour    CARB RATIO: 1 unit per 15 grams  Correction Factor (Sensitivity): 55mg/dL  BLOOD GLUCOSE TARGET and times:  12   AM (midnight): 100 - 140  5:30     AM:  100 - 120  10   PM:  100 - 140  Active Insulin Time:  5 hours     (Per Rx, pt uses 50-60 units/day)       LOVASTATIN 20 MG PO TABS 1 TABLET DAILY AT DINNER 90 Tab 0     melatonin 3 MG tablet Take 1 tablet (3 mg) by mouth nightly as needed for sleep       metFORMIN (GLUCOPHAGE) 500 MG tablet Take 500 mg by mouth 2 times daily (with meals)       Metoprolol Tartrate 37.5 MG TABS Take 37.5 mg by mouth 2 times daily       minocycline (MINOCIN/DYNACIN) 100 MG capsule Take 100 mg by mouth 2 times daily        pantoprazole (PROTONIX) 20 MG EC tablet Take 1 tablet (20 mg) by mouth daily       pioglitazone (ACTOS) 30 MG tablet Take 30 mg by mouth daily       traZODone (DESYREL) 50 MG tablet Take 25 mg by mouth At Bedtime       triamcinolone (KENALOG) 0.1 % external ointment Apply topically daily as needed for irritation       verapamil ER (CALAN-SR) 120 MG CR tablet Take 1 tablet (120 mg) by mouth At Bedtime         Medication Changes/Rationale:     Transitioned back to insulin pump, started humalog at 18 units q 24 hour infusion, patient manages at home himself will be in contact with PCP    Controlled medications sent with patient:   not applicable/none     ROS:   10 point ROS of systems including Constitutional, Eyes, Respiratory, Cardiovascular, Gastroenterology,  Genitourinary, Integumentary, Musculoskeletal, Psychiatric were all negative except for pertinent positives noted in my HPI.    Physical Exam:   Vitals: BP (!) 165/80   Pulse 67   Temp 97  F (36.1  C)   Resp 18   Wt 95.8 kg (211 lb 4.8 oz)   SpO2 94%   BMI 30.32 kg/m     BMI= Body mass index is 30.32 kg/m .  Exam:  GENERAL APPEARANCE:  Alert, in no distress  ENT:  Mouth and posterior oropharynx normal, moist mucous membranes, [place]  EYES:  EOM, conjunctivae, lids, pupils and irises normal, PERRL  RESP:  respiratory effort and palpation of chest normal, no respiratory distress, diminished breath sounds bases bilaterally, no adventitious sounds appreciated  CV:  Palpation and auscultation of heart done , regular rate and rhythm, no murmur, rub, or gallop, peripheral edema 1+ in LE bilaterally  ABDOMEN:  normal bowel sounds, soft, nontender, no hepatosplenomegaly or other masses  M/S:   Examination of:   right upper extremity, left upper extremity, right lower extremity and left lower extremity  Inspection, ROM, stability and muscle strength normal  SKIN:  Inspection of skin and subcutaneous tissue baseline, ecchymosis healing left flank area  NEURO:   Cranial nerves 2-12 are normal tested and grossly at patient's baseline, speech WNL  PSYCH:  affect and mood normal     SNF labs: Recent labs in [facility] reviewed by me today.       ASSESSMENT/PLAN:  Hemothorax on left  Closed traumatic minimally displaced fracture of multiple ribs of left side with routine healing, subsequent encounter  Physical deconditioning  Anemia due to blood loss, acute  Acute/ongoing: DC home with home PT, RN and HHA through [facility],  tylenol 650mg q 4 hours prn,     Persistent atrial fibrillation  Essential hypertension  Acute/ongoing: continue eliquis 5mg BID, increase metoprolol to  37.5mg BID, verapamil 120mg QD, clonidine 0.3mg BID  Did have appt today with daniel Maria started and will f/u with Dr. Camejo     Type 2 diabetes mellitus  with other circulatory complications (H)  Acute/ongoing: continue Blood sugar monitoring prn, continue actos 30mg QD,  Metformin 500mg BID, start ed humalog insulin pump  at 18units per 24 hours     Acute kidney injury (H)  Other hypervolemia  Acute/unstable: continue lasix to 20mg QD ongoing,  KCL 10meq QD weight trending down, creat stable     Transaminitis  Acute: alk phos elevated trending down other labs WNL     Delirium  Alcoholism /alcohol abuse (H)  Cognitive impairment  Acute/ongoing: BIms 14/15 ad SBT 2/28 monitor    DISCHARGE PLAN:    Follow up labs: No labs orders/due    Medical Follow Up:      Follow up with primary care provider in 1 weeks    MTM referral needed and placed by this provider: No    Current [place] scheduled appointments:       Discharge Services: Home Care:  Physical Therapy, Registered Nurse and Home Health Aide    Discharge Instructions Verbalized to Patient at Discharge:     None      TOTAL DISCHARGE TIME:   Greater than 30 minutes  Electronically signed by:  Tonya Lynn Haase, APRN CNP                         Sincerely,        Tonya Lynn Haase, APRN CNP

## 2019-11-21 NOTE — LETTER
11/21/2019      Senthil Moya MD  [address] [facility] [address]  [facility] [address]      RE: Tc Garcia       Dear Colleague,    I had the pleasure of seeing Tc Garcia in the [facility].    Service Date: 11/21/2019      REFERRING PROVIDER:  Dr. Senthil Moya.      HISTORY OF PRESENT ILLNESS:  Mr. Garcia is a pleasant 79-year-old gentleman who had a recent prolonged hospitalization at the end of October, just discharged on 11/10.  He sustained a very complicated mechanical fall with C3 spinous process fracture, T2 fracture, extensive ecchymosis left rib fracture associated with a hemothorax.  This was all complicated by acute renal failure, acute liver failure, possible aspiration pneumonia, anemia, delirium related to alcohol abuse.  He developed atrial fibrillation while he was in the hospital; however, this had been diagnosed apparently 2 months prior by his primary and he was placed on Eliquis.  Eliquis was disrupted during his hospitalization, but he is now back on the medication.  He did have an echocardiogram during his hospitalization that demonstrated evidence of hypertensive heart disease with moderate LVH.  His LV systolic function was preserved with an EF estimated at 55%-60%.  He had moderate to severely dilated atria, mild to moderate tricuspid insufficiency with normal pulmonary pressures estimated.  During his hospitalization, he had both a junctional and bradyarrhythmia as well as evidence of atrial flutter.  He was placed on verapamil for heart rate control.  He is in [facility] for rehab and is expecting to be discharged either today or tomorrow.  He is working with physical therapy since his hospitalization.  He has shortness of breath with minimal exertion which is gradually improving.  He denies any active chest discomfort or lightheadedness or presyncope or syncopal events.  His last blood work suggests continued anemia with a hemoglobin of 9.3 on 11/15,  continued renal insufficiency with a creatinine of 1.31 on  with normal electrolytes. Since discharge, again he is back on Eliquis and he denies any bleeding complications, blood in his urine or stool or new bruising since his fall.      PHYSICAL EXAMINATION:   VITAL SIGNS:  On exam his blood pressure is 135/84, pulse is 68, weight 215 with a body mass index of 30.   NECK:  Carotid upstrokes were diminished, but I did not appreciate bruit.   CARDIOVASCULAR:  Tones are regular with occasional ectopy, suggesting a normal sinus rhythm today.   LUNGS:  Clear.     EXTREMITIES:  He does have continued swelling of the lower extremities and his left hand.      ASSESSMENT AND PLAN:  In summary, Mr. Phillips is a pleasant 79-year-old male with a very complicated mechanical fall and prolonged hospitalization with multisystem organ disease.  He had evidence of hypertensive heart disease on echocardiogram and atrial arrhythmias as well as bradyarrhythmias during his hospitalization.  He is back on Eliquis for CVA prophylaxis and he is on verapamil for heart rate control.  Given both evidence of bradyarrhythmia and tachyarrhythmia, I would like him to wear a ZIO Patch monitor to assess if he has good heart rate control now that he is being discharged from the rehab and I will have him follow up with Dr. Camejo who he saw in the hospital.  Please feel free to contact me with any questions you have in regards to his care.      cc:   Senthil Moya MD   [facility]    [address] [place] [address]         DALE CHAVEZ DO             D: 2019   T: 2019   MT: ROMERO      Name:     CEDRICK PHILLIPS   MRN:      [id number]        Account:      [id number]   :      1940           Service Date: 2019      Document: [id number]         Outpatient Encounter Medications as of 2019   Medication Sig Dispense Refill     ACCU-CHEK COMPACT TEST DRUM VI STRP check blood  sugar three times a day and  10     apixaban ANTICOAGULANT (ELIQUIS) 5 MG tablet Take 1 tablet (5 mg) by mouth 2 times daily       cloNIDine (CATAPRES) 0.3 MG tablet Take 0.3 mg by mouth 2 times daily       glucagon 1 MG kit 1 mg as needed for low blood sugar       insulin aspart (NOVOLOG PEN) 100 UNIT/ML pen Inject as per sliding scale:if 200 - 224 = 1 unit;225 - 249 = 2 units;250 - 274 = 3 units ;275 - 299 = 4 units;300 - 324 = 5 units ;325 - 349 = 6 units ;350+ = 7 units ,subcutaneously before meals for DM2       insulin glargine (LANTUS PEN) 100 UNIT/ML pen Inject 6 unit subcutaneously two times a day for DM2 HBA1c goal <7%       INSULIN PUMP - OUTPATIENT Novolog Insulin  BASAL RATES and times:  All day: 0.95 units/hour    CARB RATIO: 1 unit per 15 grams  Correction Factor (Sensitivity): 55mg/dL  BLOOD GLUCOSE TARGET and times:  12   AM (midnight): 100 - 140  5:30     AM:  100 - 120  10   PM:  100 - 140  Active Insulin Time:  5 hours     (Per Rx, pt uses 50-60 units/day)       LOVASTATIN 20 MG PO TABS 1 TABLET DAILY AT DINNER 90 Tab 0     melatonin 3 MG tablet Take 1 tablet (3 mg) by mouth nightly as needed for sleep       metFORMIN (GLUCOPHAGE) 500 MG tablet Take 500 mg by mouth 2 times daily (with meals)       Metoprolol Tartrate 37.5 MG TABS Take 37.5 mg by mouth 2 times daily       minocycline (MINOCIN/DYNACIN) 100 MG capsule Take 100 mg by mouth 2 times daily        pantoprazole (PROTONIX) 20 MG EC tablet Take 1 tablet (20 mg) by mouth daily       pioglitazone (ACTOS) 30 MG tablet Take 30 mg by mouth daily       traZODone (DESYREL) 50 MG tablet Take 25 mg by mouth At Bedtime       triamcinolone (KENALOG) 0.1 % external ointment Apply topically daily as needed for irritation       verapamil ER (CALAN-SR) 120 MG CR tablet Take 1 tablet (120 mg) by mouth At Bedtime       [DISCONTINUED] furosemide (LASIX) 20 MG tablet Take 20 mg by mouth daily       [DISCONTINUED] furosemide (LASIX) 40 MG tablet Take 1  tablet (40 mg) by mouth daily (Patient not taking: Reported on 11/20/2019)       [DISCONTINUED] metoprolol tartrate (LOPRESSOR) 25 MG tablet Take 1 tablet (25 mg) by mouth 2 times daily (Patient not taking: Reported on 11/20/2019)       No facility-administered encounter medications on file as of 11/21/2019.        Again, thank you for allowing me to participate in the care of your patient.      Sincerely,    Cony Julien,      [facility]

## 2019-11-21 NOTE — LETTER
11/21/2019    Senthil Moya MD  [address]  [facility] [address]    RE: Tc Garcia       Dear Colleague,    I had the pleasure of seeing Tc Garcia in the [facility].    HPI and Plan:   See dictation    Orders Placed This Encounter   Procedures     Follow-Up with Electrophysiologist     Deepthi Joyce [facility] Greater than 48 hrs       No orders of the defined types were placed in this encounter.      Medications Discontinued During This Encounter   Medication Reason     furosemide (LASIX) 40 MG tablet Dose adjustment     metoprolol tartrate (LOPRESSOR) 25 MG tablet Dose adjustment         Encounter Diagnoses   Name Primary?     Atrial fibrillation, unspecified type (H) Yes     Bradyarrhythmia      LVH (left ventricular hypertrophy)        CURRENT MEDICATIONS:  Current Outpatient Medications   Medication Sig Dispense Refill     ACCU-CHEK COMPACT TEST DRUM VI STRP check blood sugar three times a day and  10     apixaban ANTICOAGULANT (ELIQUIS) 5 MG tablet Take 1 tablet (5 mg) by mouth 2 times daily       cloNIDine (CATAPRES) 0.3 MG tablet Take 0.3 mg by mouth 2 times daily       furosemide (LASIX) 20 MG tablet Take 20 mg by mouth daily       glucagon 1 MG kit 1 mg as needed for low blood sugar       insulin aspart (NOVOLOG PEN) 100 UNIT/ML pen Inject as per sliding scale:if 200 - 224 = 1 unit;225 - 249 = 2 units;250 - 274 = 3 units ;275 - 299 = 4 units;300 - 324 = 5 units ;325 - 349 = 6 units ;350+ = 7 units ,subcutaneously before meals for DM2       insulin glargine (LANTUS PEN) 100 UNIT/ML pen Inject 6 unit subcutaneously two times a day for DM2 HBA1c goal <7%       INSULIN PUMP - OUTPATIENT Novolog Insulin  BASAL RATES and times:  All day: 0.95 units/hour    CARB RATIO: 1 unit per 15 grams  Correction Factor (Sensitivity): 55mg/dL  BLOOD GLUCOSE TARGET and times:  12   AM (midnight): 100 - 140  5:30     AM:  100 - 120  10   PM:  100 - 140  Active  Insulin Time:  5 hours     (Per Rx, pt uses 50-60 units/day)       LOVASTATIN 20 MG PO TABS 1 TABLET DAILY AT DINNER 90 Tab 0     melatonin 3 MG tablet Take 1 tablet (3 mg) by mouth nightly as needed for sleep       metFORMIN (GLUCOPHAGE) 500 MG tablet Take 500 mg by mouth 2 times daily (with meals)       Metoprolol Tartrate 37.5 MG TABS Take 37.5 mg by mouth 2 times daily       minocycline (MINOCIN/DYNACIN) 100 MG capsule Take 100 mg by mouth 2 times daily        pantoprazole (PROTONIX) 20 MG EC tablet Take 1 tablet (20 mg) by mouth daily       pioglitazone (ACTOS) 30 MG tablet Take 30 mg by mouth daily       traZODone (DESYREL) 50 MG tablet Take 25 mg by mouth At Bedtime       triamcinolone (KENALOG) 0.1 % external ointment Apply topically daily as needed for irritation       verapamil ER (CALAN-SR) 120 MG CR tablet Take 1 tablet (120 mg) by mouth At Bedtime         ALLERGIES     Allergies   Allergen Reactions     No Known Drug Allergies        PAST MEDICAL HISTORY:  Past Medical History:   Diagnosis Date     Diabetic PROTEINURIA      Mixed hyperlipidemia      Personal history of colonic polyps 1972    gets colonoscopy every five years, due in      Rosacea      Type II or unspecified type diabetes mellitus without mention of complication, not stated as uncontrolled      Unspecified essential hypertension        PAST SURGICAL HISTORY:  Past Surgical History:   Procedure Laterality Date     HC REMOVE TONSILS/ADENOIDS,<13 Y/O      T & A <11y.o.       FAMILY HISTORY:  Family History   Problem Relation Age of Onset     Family History Negative Mother          age 71     Family History Negative Father          age 70     Diabetes Brother         alive age 77     Diabetes Brother         alive age 76     Family History Negative Brother              Family History Negative Brother              Diabetes Sister         alive age76     Family History Negative Sister           age 86     Heart Disease Sister              Family History Negative Sister              Family History Negative Sister              Diabetes Sister      Diabetes Sister      Diabetes Brother      Diabetes Brother        SOCIAL HISTORY:  Social History     Socioeconomic History     Marital status:      Spouse name: Lianna     Number of children: 4     Years of education: 16     Highest education level: None   Occupational History     Occupation: NICORIER     Employer: [employer]    Social Needs     Financial resource strain: None     Food insecurity:     Worry: None     Inability: None     Transportation needs:     Medical: None     Non-medical: None   Tobacco Use     Smoking status: Former Smoker     Packs/day: 0.20     Years: 40.00     Pack years: 8.00     Types: Cigarettes     Last attempt to quit: 2008     Years since quittin.8     Smokeless tobacco: Never Used     Tobacco comment: occasional   Substance and Sexual Activity     Alcohol use: Not Currently     Alcohol/week: 35.0 standard drinks     Drug use: None     Sexual activity: None   Lifestyle     Physical activity:     Days per week: None     Minutes per session: None     Stress: None   Relationships     Social connections:     Talks on phone: None     Gets together: None     Attends Rastafari service: None     Active member of club or organization: None     Attends meetings of clubs or organizations: None     Relationship status: None     Intimate partner violence:     Fear of current or ex partner: None     Emotionally abused: None     Physically abused: None     Forced sexual activity: None   Other Topics Concern     Parent/sibling w/ CABG, MI or angioplasty before 65F 55M? Not Asked   Social History Narrative     None       Review of Systems:  Skin:  Negative bruising     Eyes:  Positive for glasses    ENT:  Negative      Respiratory:  Positive for   LUCIANO   Cardiovascular:   "Negative;palpitations;chest pain;syncope or near-syncope;dizziness;lightheadedness;cyanosis Positive for;exercise intolerance;fatigue increased edema, hands and LE  Gastroenterology: Negative      Genitourinary:  Positive for urinary frequency    Musculoskeletal:  Positive for   rib pain  Neurologic:  Negative      Psychiatric:  Positive for sleep disturbances    Heme/Lymph/Imm:         Endocrine:  Negative diabetes      Physical Exam:  Vitals: /84 (BP Location: Right arm, Cuff Size: Adult Large)   Pulse 68   Ht 1.778 m (5' 10\")   Wt 97.5 kg (215 lb)   SpO2 97%   BMI 30.85 kg/m       Constitutional:  cooperative;in no acute distress        Skin:  warm and dry to the touch          Head:  normocephalic        Eyes:  pupils equal and round        Lymph:      ENT:  dentition good        Neck:  no carotid bruit        Respiratory:  clear to auscultation;normal symmetry diminished breath sounds bilaterally       Cardiac: regular rhythm       systolic ejection murmur;RUSB          pulses below the femoral arteries are diminished                                      GI:  abdomen soft        Extremities and Muscular Skeletal:      bilateral LE edema;1+          Neurological:  no gross motor deficits        Psych:  Alert and Oriented x 3          CC  Senthil Moya MD  [address]  [place] [address]                    Thank you for allowing me to participate in the care of your patient.      Sincerely,     Cony Julien,      [facility]    cc:   Senthil Moya MD  [address]  [place] [address]        "

## 2019-11-22 DIAGNOSIS — I10 ESSENTIAL HYPERTENSION: Primary | ICD-10-CM

## 2019-11-22 RX ORDER — FUROSEMIDE 20 MG
TABLET ORAL
Qty: 90 TABLET | Refills: 0 | Status: SHIPPED | OUTPATIENT
Start: 2019-11-22 | End: 2020-02-25

## 2019-11-22 RX ORDER — METOPROLOL TARTRATE 25 MG/1
TABLET, FILM COATED ORAL
Qty: 270 TABLET | Refills: 0 | Status: SHIPPED | OUTPATIENT
Start: 2019-11-22 | End: 2020-01-08 | Stop reason: ALTCHOICE

## 2019-11-22 RX ORDER — POTASSIUM CHLORIDE 750 MG/1
CAPSULE, EXTENDED RELEASE ORAL
Qty: 90 CAPSULE | Refills: 0 | Status: SHIPPED | OUTPATIENT
Start: 2019-11-22 | End: 2020-02-25

## 2019-12-30 ENCOUNTER — APPOINTMENT (OUTPATIENT)
Dept: GENERAL RADIOLOGY | Facility: CLINIC | Age: 79
DRG: 186 | End: 2019-12-30
Attending: EMERGENCY MEDICINE
Payer: COMMERCIAL

## 2019-12-30 ENCOUNTER — HOSPITAL ENCOUNTER (INPATIENT)
Facility: CLINIC | Age: 79
LOS: 1 days | Discharge: HOME | DRG: 186 | End: 2019-12-31
Attending: EMERGENCY MEDICINE | Admitting: INTERNAL MEDICINE
Payer: COMMERCIAL

## 2019-12-30 DIAGNOSIS — J90 PLEURAL EFFUSION: ICD-10-CM

## 2019-12-30 DIAGNOSIS — R09.02 HYPOXIA: ICD-10-CM

## 2019-12-30 LAB
ANION GAP SERPL CALCULATED.3IONS-SCNC: 9 MMOL/L (ref 3–14)
BASOPHILS # BLD AUTO: 0.1 10E9/L (ref 0–0.2)
BASOPHILS NFR BLD AUTO: 0.4 %
BUN SERPL-MCNC: 11 MG/DL (ref 7–30)
CALCIUM SERPL-MCNC: 9.6 MG/DL (ref 8.5–10.1)
CHLORIDE SERPL-SCNC: 105 MMOL/L (ref 94–109)
CO2 SERPL-SCNC: 26 MMOL/L (ref 20–32)
CREAT SERPL-MCNC: 0.98 MG/DL (ref 0.66–1.25)
DIFFERENTIAL METHOD BLD: ABNORMAL
EOSINOPHIL # BLD AUTO: 0 10E9/L (ref 0–0.7)
EOSINOPHIL NFR BLD AUTO: 0.3 %
ERYTHROCYTE [DISTWIDTH] IN BLOOD BY AUTOMATED COUNT: 15.4 % (ref 10–15)
GFR SERPL CREATININE-BSD FRML MDRD: 72 ML/MIN/{1.73_M2}
GLUCOSE SERPL-MCNC: 275 MG/DL (ref 70–99)
HCT VFR BLD AUTO: 39 % (ref 40–53)
HGB BLD-MCNC: 12.5 G/DL (ref 13.3–17.7)
IMM GRANULOCYTES # BLD: 0.1 10E9/L (ref 0–0.4)
IMM GRANULOCYTES NFR BLD: 0.4 %
INTERPRETATION ECG - MUSE: NORMAL
LDH SERPL L TO P-CCNC: 304 U/L (ref 85–227)
LYMPHOCYTES # BLD AUTO: 1.3 10E9/L (ref 0.8–5.3)
LYMPHOCYTES NFR BLD AUTO: 9.3 %
MCH RBC QN AUTO: 27.5 PG (ref 26.5–33)
MCHC RBC AUTO-ENTMCNC: 32.1 G/DL (ref 31.5–36.5)
MCV RBC AUTO: 86 FL (ref 78–100)
MONOCYTES # BLD AUTO: 1.1 10E9/L (ref 0–1.3)
MONOCYTES NFR BLD AUTO: 7.8 %
NEUTROPHILS # BLD AUTO: 11.2 10E9/L (ref 1.6–8.3)
NEUTROPHILS NFR BLD AUTO: 81.8 %
NRBC # BLD AUTO: 0 10*3/UL
NRBC BLD AUTO-RTO: 0 /100
NT-PROBNP SERPL-MCNC: 3634 PG/ML (ref 0–1800)
PLATELET # BLD AUTO: 387 10E9/L (ref 150–450)
POTASSIUM SERPL-SCNC: 3.8 MMOL/L (ref 3.4–5.3)
PROCALCITONIN SERPL-MCNC: 0.1 NG/ML
PROT SERPL-MCNC: 7 G/DL (ref 6.8–8.8)
RBC # BLD AUTO: 4.54 10E12/L (ref 4.4–5.9)
SODIUM SERPL-SCNC: 140 MMOL/L (ref 133–144)
TROPONIN I SERPL-MCNC: 0.03 UG/L (ref 0–0.04)
WBC # BLD AUTO: 13.6 10E9/L (ref 4–11)

## 2019-12-30 PROCEDURE — 84145 PROCALCITONIN (PCT): CPT | Performed by: EMERGENCY MEDICINE

## 2019-12-30 PROCEDURE — 12000000 ZZH R&B MED SURG/OB

## 2019-12-30 PROCEDURE — 93005 ELECTROCARDIOGRAM TRACING: CPT

## 2019-12-30 PROCEDURE — 00000146 ZZHCL STATISTIC GLUCOSE BY METER IP

## 2019-12-30 PROCEDURE — 84484 ASSAY OF TROPONIN QUANT: CPT | Performed by: EMERGENCY MEDICINE

## 2019-12-30 PROCEDURE — 84155 ASSAY OF PROTEIN SERUM: CPT | Performed by: EMERGENCY MEDICINE

## 2019-12-30 PROCEDURE — 80048 BASIC METABOLIC PNL TOTAL CA: CPT | Performed by: EMERGENCY MEDICINE

## 2019-12-30 PROCEDURE — 99285 EMERGENCY DEPT VISIT HI MDM: CPT | Mod: 25

## 2019-12-30 PROCEDURE — 83615 LACTATE (LD) (LDH) ENZYME: CPT | Performed by: EMERGENCY MEDICINE

## 2019-12-30 PROCEDURE — 99223 1ST HOSP IP/OBS HIGH 75: CPT | Mod: AI | Performed by: INTERNAL MEDICINE

## 2019-12-30 PROCEDURE — 25000132 ZZH RX MED GY IP 250 OP 250 PS 637: Performed by: INTERNAL MEDICINE

## 2019-12-30 PROCEDURE — 85025 COMPLETE CBC W/AUTO DIFF WBC: CPT | Performed by: EMERGENCY MEDICINE

## 2019-12-30 PROCEDURE — 71046 X-RAY EXAM CHEST 2 VIEWS: CPT

## 2019-12-30 PROCEDURE — 83880 ASSAY OF NATRIURETIC PEPTIDE: CPT | Performed by: EMERGENCY MEDICINE

## 2019-12-30 RX ORDER — ONDANSETRON 4 MG/1
4 TABLET, ORALLY DISINTEGRATING ORAL EVERY 6 HOURS PRN
Status: DISCONTINUED | OUTPATIENT
Start: 2019-12-30 | End: 2019-12-31 | Stop reason: HOSPADM

## 2019-12-30 RX ORDER — ONDANSETRON 2 MG/ML
4 INJECTION INTRAMUSCULAR; INTRAVENOUS EVERY 6 HOURS PRN
Status: DISCONTINUED | OUTPATIENT
Start: 2019-12-30 | End: 2019-12-31 | Stop reason: HOSPADM

## 2019-12-30 RX ORDER — NICOTINE POLACRILEX 4 MG
15-30 LOZENGE BUCCAL
Status: DISCONTINUED | OUTPATIENT
Start: 2019-12-30 | End: 2019-12-31 | Stop reason: HOSPADM

## 2019-12-30 RX ORDER — PANTOPRAZOLE SODIUM 20 MG/1
20 TABLET, DELAYED RELEASE ORAL DAILY
Status: DISCONTINUED | OUTPATIENT
Start: 2019-12-30 | End: 2019-12-30

## 2019-12-30 RX ORDER — LANOLIN ALCOHOL/MO/W.PET/CERES
3 CREAM (GRAM) TOPICAL
Status: DISCONTINUED | OUTPATIENT
Start: 2019-12-30 | End: 2019-12-30

## 2019-12-30 RX ORDER — NALOXONE HYDROCHLORIDE 0.4 MG/ML
.1-.4 INJECTION, SOLUTION INTRAMUSCULAR; INTRAVENOUS; SUBCUTANEOUS
Status: DISCONTINUED | OUTPATIENT
Start: 2019-12-30 | End: 2019-12-31 | Stop reason: HOSPADM

## 2019-12-30 RX ORDER — SIMVASTATIN 10 MG
10 TABLET ORAL AT BEDTIME
Status: DISCONTINUED | OUTPATIENT
Start: 2019-12-30 | End: 2019-12-31 | Stop reason: HOSPADM

## 2019-12-30 RX ORDER — DEXTROSE MONOHYDRATE 25 G/50ML
25-50 INJECTION, SOLUTION INTRAVENOUS
Status: DISCONTINUED | OUTPATIENT
Start: 2019-12-30 | End: 2019-12-31 | Stop reason: HOSPADM

## 2019-12-30 RX ORDER — ACETAMINOPHEN 325 MG/1
650 TABLET ORAL EVERY 4 HOURS PRN
Status: DISCONTINUED | OUTPATIENT
Start: 2019-12-30 | End: 2019-12-31 | Stop reason: HOSPADM

## 2019-12-30 RX ORDER — LIDOCAINE 40 MG/G
CREAM TOPICAL
Status: DISCONTINUED | OUTPATIENT
Start: 2019-12-30 | End: 2019-12-31 | Stop reason: HOSPADM

## 2019-12-30 RX ORDER — LOVASTATIN 20 MG
20 TABLET ORAL AT BEDTIME
Status: DISCONTINUED | OUTPATIENT
Start: 2019-12-30 | End: 2019-12-30

## 2019-12-30 RX ORDER — DEXTROSE MONOHYDRATE 25 G/50ML
25-50 INJECTION, SOLUTION INTRAVENOUS
Status: DISCONTINUED | OUTPATIENT
Start: 2019-12-30 | End: 2019-12-31

## 2019-12-30 RX ORDER — VERAPAMIL HYDROCHLORIDE 120 MG/1
120 TABLET, FILM COATED, EXTENDED RELEASE ORAL EVERY MORNING
Status: DISCONTINUED | OUTPATIENT
Start: 2019-12-31 | End: 2019-12-31 | Stop reason: HOSPADM

## 2019-12-30 RX ORDER — ACETAMINOPHEN 650 MG/1
650 SUPPOSITORY RECTAL EVERY 4 HOURS PRN
Status: DISCONTINUED | OUTPATIENT
Start: 2019-12-30 | End: 2019-12-31 | Stop reason: HOSPADM

## 2019-12-30 RX ORDER — NICOTINE POLACRILEX 4 MG
15-30 LOZENGE BUCCAL
Status: DISCONTINUED | OUTPATIENT
Start: 2019-12-30 | End: 2019-12-31

## 2019-12-30 RX ORDER — FUROSEMIDE 20 MG
20 TABLET ORAL DAILY
Status: DISCONTINUED | OUTPATIENT
Start: 2019-12-31 | End: 2019-12-31 | Stop reason: HOSPADM

## 2019-12-30 RX ADMIN — CLONIDINE HYDROCHLORIDE 0.3 MG: 0.1 TABLET ORAL at 21:59

## 2019-12-30 RX ADMIN — SIMVASTATIN 10 MG: 10 TABLET, FILM COATED ORAL at 21:59

## 2019-12-30 RX ADMIN — METOPROLOL TARTRATE 37.5 MG: 25 TABLET ORAL at 21:59

## 2019-12-30 ASSESSMENT — ENCOUNTER SYMPTOMS
HEMATURIA: 1
SHORTNESS OF BREATH: 1
COUGH: 0

## 2019-12-30 ASSESSMENT — MIFFLIN-ST. JEOR: SCORE: 1488.5

## 2019-12-30 ASSESSMENT — ACTIVITIES OF DAILY LIVING (ADL): ADLS_ACUITY_SCORE: 16

## 2019-12-30 NOTE — ED NOTES
Bed: ED20  Expected date: 12/30/19  Expected time: 4:31 PM  Means of arrival:   Comments:  Pt in room

## 2019-12-30 NOTE — ED PROVIDER NOTES
"  History     Chief Complaint:  Shortness of Breath    The history is provided by the patient.      Tc Garcia is a 79 year old type II diabetic male anticoagulated on Eliquis with a history of atrial fibrillation, hypertension, and hyperlipidemia who presents to the emergency department today for evaluation of shortness of breath. The patient reports that over the past three days he has become significantly more short of breath especially with exertion. He states the shortness of breath is alleviated by resting, and he will begin to \"pant\" when he exerts himself. He denies having any chest pain, new leg swelling, cough, or fever. The patient is unsure of whether he is chronically in atrial fibrillation or not. The patient adds that he occasionally has some hematuria, but states this is normal for him and he has been following with his primary care doctor regarding this.     Of note he had a mechanical fall in October and was admitted for multiple rib fractures, L hemothorax, spinous process fracture of C3, T2 fracture, cardiac rhythm abnormalities (harrison, nonsustained V Tach, junctional escape), acute liver injury 2/2 ischemia and alcohol use, ABBIE.    Allergies:  No Known Drug Allergies     Medications:    Eliquis  Lasix  Catapres  Glucagon  Novolog Pen  Lantus Pen  Lovastatin  Metformin  Metoprolol tartrate  Minocycline  Protonix  Pioglitazone  Potassium chloride   Trazodone  Verapamil ER    Past Medical History:    Hyperlipidemia  Diabetes, type II  Plantar fascial fibromatosis  Hypertension    Hemothorax, left   Diabetic proteinuria  Colonic polyps     Past Surgical History:    Tonsillectomy and adenoidectomy     Family History:    Diabetes   Heart disease- Sister     Social History:  The patient was accompanied to the ED by his wife.  Smoking Status: Former smoker, quit 2008  Smokeless Tobacco: Never Used  Alcohol Use: Not currently    Marital Status:       Review of Systems   Respiratory: Positive for " "shortness of breath. Negative for cough.    Cardiovascular: Negative for chest pain and leg swelling.   Genitourinary: Positive for hematuria.   All other systems reviewed and are negative.      Physical Exam     Patient Vitals for the past 24 hrs:   BP Temp Temp src Heart Rate Resp SpO2 Height Weight   12/30/19 1856 -- -- -- 92 22 99 % -- --   12/30/19 1827 -- -- -- 79 19 97 % -- --   12/30/19 1822 -- -- -- 74 18 99 % -- --   12/30/19 1818 -- -- -- 78 18 99 % -- --   12/30/19 1803 -- -- -- 79 16 100 % -- --   12/30/19 1800 -- -- -- 72 25 99 % -- --   12/30/19 1753 -- -- -- 72 17 100 % -- --   12/30/19 1741 -- -- -- 84 20 99 % -- --   12/30/19 1731 -- -- -- 81 13 99 % -- --   12/30/19 1721 -- -- -- 86 19 96 % -- --   12/30/19 1715 -- -- -- 81 18 100 % -- --   12/30/19 1714 -- -- -- 83 23 (!) 84 % -- --   12/30/19 1710 -- -- -- 86 17 99 % -- --   12/30/19 1659 -- -- -- 96 19 100 % -- --   12/30/19 1416 (!) 202/98 97  F (36.1  C) Temporal 115 -- 99 % 1.765 m (5' 9.5\") 78 kg (172 lb)      Physical Exam  General/Appearance: appears stated age, well-groomed, appears comfortable  Eyes: EOMI, no scleral injection, no icterus  ENT: MMM  Neck: supple, nl ROM, no stiffness  Cardiovascular: tachy and irregularly irregular, nl S1S2, no m/r/g, 2+ pulses in all 4 extremities, cap refill <2sec  Respiratory: decreased BS to L lung base, good air movement otherwise, no tachypnea at rest  Back: no lesions  GI: abd soft, non-distended, nttp,  no HSM, no rebound, no guarding, nl BS  MSK: DOWNS, good tone, no bony abnormality  Skin: warm and well-perfused, no rash, no edema, no ecchymosis, nl turgor  Neuro: GCS 15, alert and oriented, no gross focal neuro deficits  Psych: interacts appropriately  Heme: no petechia, no purpura, no active bleeding        Emergency Department Course     ECG:  ECG taken at 1719, ECG read at 1730  Atrial flutter with variable AV block  Nonspecific T wave abnormality   Abnormal ECG   Rate 76 bpm. IL interval *. " QRS duration 88. QT/QTc 372/418. P-R-T axes * 23 *55.  No significant change compared to 11/07/2019.     Imaging:  Radiology findings were communicated with the patient and family who voiced understanding of the findings.  XR, Chest, PA & LAT  Moderate to large left-sided pleural effusion has increased since prior exam. Superimposed left retrocardiac pneumonia cannot be excluded. Compressive atelectasis of the left lung. Multiple left-sided rib fractures are present and appear similar to prior study. Right lung is clear. Multilevel degenerative changes seen in the spine. Calcification seen overlying the aortic knob.  Reading per radiology      Laboratory:  Laboratory findings were communicated with the patient and family who voiced understanding of the findings.  CBC: WNL (WBC 13.6, HGB 12.5, )  BMP: Glucose 275 (H). O/w WNL (Creatinine 0.98)  Troponin (Collected 1714): 0.027  Nt proBNP intp: 3,364 (H)  Procalcitonin: 0.10     Emergency Department Course:  1624 The patient was sent for a chest x-ray while in the emergency department, results above.    1654 Nursing notes and vitals reviewed.  1700 I performed an exam of the patient as documented above.   1716 IV was inserted and blood was drawn for laboratory testing, results above.   1719 An ECG was performed, results above.   1804 I checked on the patient and updated him with laboratory and imaging results.   1822 I spoke with Dr. Lovett of the interventional radiology service regarding patient's presentation, findings, and plan of care.   1825 I spoke with Dr. Lovett of the interventional radiology service regarding patient's presentation, findings, and plan of care.   1827 Hospitalist called.  1829 Patient was rechecked and updated with plan for admission.  1849 I spoke with Dr. Triplett of the hospitalist service from [place] regarding patient's presentation, findings, and plan of care.      Findings and plan explained to the Patient and spouse who  consents to admission. Discussed the patient with Dr. Triplett, who will admit the patient to an adult medical-surgical bed for further monitoring, evaluation, and treatment.     I personally reviewed the laboratory and imaging results with the Patient and spouse and answered all related questions prior to admission.    Impression & Plan      Medical Decision Making:  This patient is an 79-year-old male, on Eliquis, with history of recent trauma with multiple rib fractures, left hemothorax, liver injury and kidney injury, who presents today with shortness of breath.  He does not have cardiac history per se except for A. fib/a flutter but does have hypertension and hyperlipidemia.  I have low suspicion for PE given the fact that he is already on Eliquis.  I do think infection is less likely given his lack of cough, chest pain, fever.  Chest x-ray shows a large left pleural effusion.  Is possible this is a hemothorax however, as his shortness of breath had completely resolved after his trauma, is more likely fluid.  Is possible he is got an underlying pneumonia however I think it will be more helpful to look at the fluid from the tap and hold off on antibiotics.  He also reports 30 pounds of weight loss recently so it is possible this is also related to a tumor.  I spoke with interventional radiology who needs to wait 24 hours after his last Eliquis dose, given that he is fairly stable, so will come into the hospitalist service.  He is requiring oxygen as he was satting in the low 80s on room air.  Otherwise ACS looks unremarkable.  He is not clinically fluid overloaded so I doubt CHF.        Diagnosis:    ICD-10-CM    1. Pleural effusion J90    2. Hypoxia R09.02        Disposition:  The patient is admitted into the care of Dr. Triplett.     Scribe Disclosure:  I, Olga Sánchez, am serving as a scribe at 4:56 PM on 12/30/2019 to document services personally performed by Cathi Frost MD based on my  observations and the provider's statements to me.    12/30/2019    EMERGENCY DEPARTMENT       Cathi Frost MD  12/30/19 2030

## 2019-12-30 NOTE — LETTER
Transition Communication Hand-off for Care Transitions to Next Level of Care Provider    Name: Tc Garcia  : 1940  MRN #: [id number]  Primary Care Provider: Senthil Moya  Primary Care MD Name: Griffin Senthil   Primary Clinic: [address] [facility] [address]  [place] [address]  Primary Care Clinic Name: [facility]  Reason for Hospitalization:  Pleural effusion [J90]  Hypoxia [R09.02]  Admit Date/Time: 2019  4:41 PM  Discharge Date: 19   Payor Source: Payor: MEDICA / Plan: [employer] / Product Type: HMO /     Readmission Assessment Measure (HIRAM) Risk Score/category: average           Reason for Communication Hand-off Referral: Fragility  No support or lacking a support system  Multiple providers/specialties    Discharge Plan: admitted to [facility] for increasing Shortness of breath found to have Left sided pleural effusion, transudative requiring fluid removal 2200 ml with pending cytology.  Patient was recently at TCU for a fall with rib fractures, and required event monitor.  Patient also endorsed upon admission an unintentional weight loss of 30# but declined further work up in the inpatient setting.  Patient is scheduled to see [facility] for EP follow up  (reading of event monitor ) patient scheduled to see provider Charlie Davis on  at 12:00 p.m. for post hospital follow up and recommendations for further imaging for weight loss.  Cytology results from para should be listed in care everywhere. At time of discharge patient was able to maintain room air with ambulation. Patient was ambulating independently A+Ox4.  Patient had no further needs for discharge         Concern for non-adherence with plan of care:   Y/N n  Discharge Needs Assessment:  Needs      Most Recent Value   # of Referrals Placed by [facility]  Internal Clinic Care Coordination, Specialty Providers, Transportation, Scheduled Follow-up appointments, UMP, Communication hand-offs  to next level of Care Providers          Already enrolled in Tele-monitoring program and name of program:  n/a  Follow-up specialty is recommended: Yes    Follow-up plan:    Future Appointments   Date Time Provider Department Center   1/7/2020 10:15 AM Quinton Camejo MD [facility]       Any outstanding tests or procedures:              Key Recommendations:  [facility] follow up and PCP follow up with further recommendations for imaging ?malignancy for unintentional weight loss.     Mary Braden RN    AVS/Discharge Summary is the source of truth; this is a helpful guide for improved communication of patient story

## 2019-12-31 ENCOUNTER — APPOINTMENT (OUTPATIENT)
Dept: ULTRASOUND IMAGING | Facility: CLINIC | Age: 79
DRG: 186 | End: 2019-12-31
Attending: INTERNAL MEDICINE
Payer: COMMERCIAL

## 2019-12-31 VITALS
SYSTOLIC BLOOD PRESSURE: 144 MMHG | DIASTOLIC BLOOD PRESSURE: 69 MMHG | HEIGHT: 70 IN | RESPIRATION RATE: 18 BRPM | TEMPERATURE: 96.8 F | WEIGHT: 172 LBS | HEART RATE: 50 BPM | OXYGEN SATURATION: 96 % | BODY MASS INDEX: 24.62 KG/M2

## 2019-12-31 LAB
APPEARANCE FLD: CLEAR
COLOR FLD: YELLOW
GLUCOSE BLDC GLUCOMTR-MCNC: 177 MG/DL (ref 70–99)
GLUCOSE BLDC GLUCOMTR-MCNC: 181 MG/DL (ref 70–99)
GLUCOSE BLDC GLUCOMTR-MCNC: 198 MG/DL (ref 70–99)
GLUCOSE BLDC GLUCOMTR-MCNC: 199 MG/DL (ref 70–99)
GLUCOSE BLDC GLUCOMTR-MCNC: 69 MG/DL (ref 70–99)
GLUCOSE FLD-MCNC: 180 MG/DL
GRAM STN SPEC: NORMAL
INR PPP: 1.65 (ref 0.86–1.14)
LDH FLD L TO P-CCNC: 97 U/L
LYMPHOCYTES NFR FLD MANUAL: 81 %
MONOS+MACROS NFR FLD MANUAL: 2 %
NEUTS BAND NFR FLD MANUAL: 2 %
OTHER CELLS FLD MANUAL: 15 %
PROT FLD-MCNC: 3.2 G/DL
SPECIMEN SOURCE FLD: NORMAL
SPECIMEN SOURCE: NORMAL
WBC # FLD AUTO: 568 /UL

## 2019-12-31 PROCEDURE — 87205 SMEAR GRAM STAIN: CPT | Performed by: INTERNAL MEDICINE

## 2019-12-31 PROCEDURE — 87015 SPECIMEN INFECT AGNT CONCNTJ: CPT | Performed by: INTERNAL MEDICINE

## 2019-12-31 PROCEDURE — 88112 CYTOPATH CELL ENHANCE TECH: CPT | Mod: 26 | Performed by: INTERNAL MEDICINE

## 2019-12-31 PROCEDURE — 32555 ASPIRATE PLEURA W/ IMAGING: CPT

## 2019-12-31 PROCEDURE — 25000132 ZZH RX MED GY IP 250 OP 250 PS 637: Performed by: INTERNAL MEDICINE

## 2019-12-31 PROCEDURE — 83615 LACTATE (LD) (LDH) ENZYME: CPT | Performed by: INTERNAL MEDICINE

## 2019-12-31 PROCEDURE — 87070 CULTURE OTHR SPECIMN AEROBIC: CPT | Performed by: INTERNAL MEDICINE

## 2019-12-31 PROCEDURE — 85610 PROTHROMBIN TIME: CPT | Performed by: PHYSICIAN ASSISTANT

## 2019-12-31 PROCEDURE — 88305 TISSUE EXAM BY PATHOLOGIST: CPT | Performed by: INTERNAL MEDICINE

## 2019-12-31 PROCEDURE — 0W9B3ZZ DRAINAGE OF LEFT PLEURAL CAVITY, PERCUTANEOUS APPROACH: ICD-10-PCS | Performed by: PHYSICIAN ASSISTANT

## 2019-12-31 PROCEDURE — 88305 TISSUE EXAM BY PATHOLOGIST: CPT | Mod: 26 | Performed by: INTERNAL MEDICINE

## 2019-12-31 PROCEDURE — 88112 CYTOPATH CELL ENHANCE TECH: CPT | Performed by: INTERNAL MEDICINE

## 2019-12-31 PROCEDURE — 00000102 ZZHCL STATISTIC CYTO WRIGHT STAIN TC: Performed by: INTERNAL MEDICINE

## 2019-12-31 PROCEDURE — 84157 ASSAY OF PROTEIN OTHER: CPT | Performed by: INTERNAL MEDICINE

## 2019-12-31 PROCEDURE — 25000125 ZZHC RX 250: Performed by: INTERNAL MEDICINE

## 2019-12-31 PROCEDURE — 99238 HOSP IP/OBS DSCHRG MGMT 30/<: CPT | Performed by: INTERNAL MEDICINE

## 2019-12-31 PROCEDURE — 36415 COLL VENOUS BLD VENIPUNCTURE: CPT | Performed by: PHYSICIAN ASSISTANT

## 2019-12-31 PROCEDURE — 82945 GLUCOSE OTHER FLUID: CPT | Performed by: INTERNAL MEDICINE

## 2019-12-31 PROCEDURE — 00000146 ZZHCL STATISTIC GLUCOSE BY METER IP

## 2019-12-31 PROCEDURE — 40000863 ZZH STATISTIC RADIOLOGY XRAY, US, CT, MAR, NM

## 2019-12-31 PROCEDURE — 89051 BODY FLUID CELL COUNT: CPT | Performed by: INTERNAL MEDICINE

## 2019-12-31 PROCEDURE — 00000155 ZZHCL STATISTIC H-CELL BLOCK W/STAIN: Performed by: INTERNAL MEDICINE

## 2019-12-31 RX ORDER — LIDOCAINE HYDROCHLORIDE 10 MG/ML
10 INJECTION, SOLUTION EPIDURAL; INFILTRATION; INTRACAUDAL; PERINEURAL ONCE
Status: COMPLETED | OUTPATIENT
Start: 2019-12-31 | End: 2019-12-31

## 2019-12-31 RX ADMIN — METOPROLOL TARTRATE 37.5 MG: 25 TABLET ORAL at 09:16

## 2019-12-31 RX ADMIN — CLONIDINE HYDROCHLORIDE 0.3 MG: 0.1 TABLET ORAL at 09:17

## 2019-12-31 RX ADMIN — FUROSEMIDE 20 MG: 20 TABLET ORAL at 09:16

## 2019-12-31 RX ADMIN — VERAPAMIL HYDROCHLORIDE 120 MG: 120 TABLET, FILM COATED, EXTENDED RELEASE ORAL at 09:16

## 2019-12-31 RX ADMIN — LIDOCAINE HYDROCHLORIDE 10 ML: 10 INJECTION, SOLUTION EPIDURAL; INFILTRATION; INTRACAUDAL; PERINEURAL at 10:04

## 2019-12-31 ASSESSMENT — ACTIVITIES OF DAILY LIVING (ADL)
ADLS_ACUITY_SCORE: 13
ADLS_ACUITY_SCORE: 15

## 2019-12-31 NOTE — PROVIDER NOTIFICATION
816-1 M.D. Would you like to add sliding scale and stop his personal insulin pump? Also, with cardiac history, should we order Telemetry? Thank you. Maria T Isaac RN

## 2019-12-31 NOTE — PHARMACY-ADMISSION MEDICATION HISTORY
Pharmacy Medication History  Admission medication history interview status for the 12/30/2019  admission is complete. See EPIC admission navigator for prior to admission medications     Medication history sources: Patient, Surescripts and on call md from pcp clinic- dr. sequeira  Medication history source reliability: Moderate  Adherence assessment: Good    Significant changes made to the medication list:  Removed melatonin, minocycline, protonix per pt. Adjusted insulin pump info    Additional medication history information:    Pt says metoprolol, lovastatin, KCl, and lasix stopped on 12/2 and 12/3 by Dr. Dante Moya from [facility]. On call MD read thru clinic notes and they state to stop lisinopril, losartan/hctz, propranolol. Continue lasix, kcl, and metoprolol    Medication reconciliation completed by provider prior to medication history? Yes    Time spent in this activity: 60 minutes      Prior to Admission medications    Medication Sig Last Dose Taking? Auth Provider   apixaban ANTICOAGULANT (ELIQUIS) 5 MG tablet Take 1 tablet (5 mg) by mouth 2 times daily 12/30/2019 at 9am Yes Jas Jaramillo MD   cloNIDine (CATAPRES) 0.3 MG tablet Take 0.3 mg by mouth 2 times daily 12/30/2019 at 9am Yes Unknown, Entered By History   furosemide (LASIX) 20 MG tablet TAKE 1 TABLET BY MOUTH EVERY DAY 12/30/2019 at am Yes Sun Can APRN CNP   glucagon 1 MG kit 1 mg as needed for low blood sugar  Yes Unknown, Entered By History   INSULIN PUMP - OUTPATIENT Novolog Insulin  BASAL RATES and times:  Midnight to 6am: 0.65 units/hr  6am to 10:30 pm: 0.95 units/hour    10:30pm to midnight: 0.55 units/hr  CARB RATIO: 1 unit per 15 grams  Correction Factor (Sensitivity): 55mg/dL  BLOOD GLUCOSE TARGET and times:  12   AM (midnight): 100 - 140  5:30     AM:  100 - 120  10   PM:  100 - 140  Active Insulin Time:  5 hours   Checks insulin about 4-5 times a day manually  (Per Rx, pt uses 50-60 units/day)  Yes Unknown,  Entered By History   LOVASTATIN 20 MG PO TABS 1 TABLET DAILY AT DINNER 12/29/2019 at pm Yes Tc Palacios MD   metFORMIN (GLUCOPHAGE) 500 MG tablet Take 500 mg by mouth 2 times daily (with meals) 12/30/2019 at am Yes Unknown, Entered By History   metoprolol tartrate (LOPRESSOR) 25 MG tablet TAKE 1 AND 1/2 TABLETS BY MOUTH TWICE DAILY 12/30/2019 at am Yes Sun Can APRN CNP   pioglitazone (ACTOS) 30 MG tablet Take 30 mg by mouth daily (with breakfast)  12/30/2019 at am Yes Unknown, Entered By History   potassium chloride ER (MICRO-K) 10 MEQ CR capsule TAKE 1 CAPSULE BY MOUTH EVERY DAY 12/30/2019 at am Yes Sun Can APRN CNP   triamcinolone (KENALOG) 0.1 % external ointment Apply topically daily as needed for irritation  at prn Yes Unknown, Entered By History   verapamil ER (CALAN-SR) 120 MG CR tablet Take 1 tablet (120 mg) by mouth At Bedtime  Patient taking differently: Take 120 mg by mouth every morning  12/30/2019 at am Yes Jas Jaramillo MD

## 2019-12-31 NOTE — PLAN OF CARE
Spoke with pharmacy re: pt insulin pump and appropriate paperwork, or Epic documentation. Pharmacy attempted to page Dr. Triplett x2 prior to 2200 with no call-back. RN spoke with Dr. Triplett prior to this and he related that the pump would be ok in place/on overnight and requested staff to check blood glucose Q4 as normal and the patient will be able to manage his pump as he would at home. Further, MD stated he or rounding physician tomorrow would enter specific orders to discontinue home pump and add sliding scale orders/parameters. Pharmacy requested RN to enter pharmacy consult in order to add proper form/documentation about home pump, until addressed tomorrow.

## 2019-12-31 NOTE — CONSULTS
"CLINICAL NUTRITION SERVICES  -  ASSESSMENT NOTE      Recommendations Ordered by Registered Dietitian (RD):     Reviewed current diet order.  Encouraged pt to eat small, frequent meals every 3-4 hrs, even if he doesn't feel hungry.  Will send pt a chocolate Boost Glucose Control at 2pm     Malnutrition:   % Weight Loss:  > 5% in 1 month (severe malnutrition)  % Intake:  </= 75% for >/= 1 month (severe malnutrition)  Subcutaneous Fat Loss:  Orbital region - mild depletion  Muscle Loss:  Temporal region - mild depletion and Clavicle bone region - mild depletion  Fluid Retention:  None noted    Malnutrition Diagnosis: Severe malnutrition  In Context of:  Acute illness or injury        REASON FOR ASSESSMENT  Tc Garcia is a 79 year old male seen by Registered Dietitian for Admission Nutrition Risk Screen for unintentional loss of 10# or more in the past two months and Provider Order - \"Malnutrition.  30# in last month\"      NUTRITION HISTORY  Visited with pt this morning  Pt tells me that he is usually a good eater  Follows a regular diet  \"When we would go out to eat, I could eat at least 1/2 of the large restaurant portions\"  Notes that over the past month, his intake has decreased (previously was eating 3 meals per day_  Breakfast has been oatmeal  Lunch - a Glucerna DM supplement  Hasn't been eating dinner  \"My doctor said not to worry about carbs and just eat high calorie\"      CURRENT NUTRITION ORDERS  Diet Order:     Moderate Consistent Carbohydrate     Pt just back from procedure - was eating breakfast (egg/cheese/sausage sandwich, fruit, cookie, lemonade)  Reports feeling better after the thoracentesis - \"I hope I will start eating better now\"      NUTRITION FOCUSED PHYSICAL ASSESSMENT FOR DIAGNOSING MALNUTRITION)  Yes               Observed:    Muscle wasting (refer to documentation in Malnutrition section) and Subcutaneous fat loss (refer to documentation in Malnutrition section)    Obtained from " "Chart/Interdisciplinary Team:  12/31: thoracentesis - 2200 ml of clear yellow  fluid removed from left side.  No edema     ANTHROPOMETRICS  Height: 5' 9.5\"  Weight:(12/30) 78 kg / 172 lbs 0 oz  Body mass index is 25.04 kg/m .  Weight Status:  Overweight BMI 25-29.9  IBW: 74.1 kg  % IBW: 105%  Weight History: Pt confirms wt loss - \"I am running out of holes to tighten my belt.\"  States people have noticed wt loss in his face.  He has had ~40# wt loss over the past month (18%).  Wt Readings from Last 10 Encounters:   12/30/19 78 kg (172 lb)   11/20/19 95.8 kg (211 lb 4.8 oz)   11/21/19 97.5 kg (215 lb)   11/18/19 96.6 kg (213 lb)   11/13/19 99.2 kg (218 lb 11.2 oz)   11/10/19 99.7 kg (219 lb 12.8 oz)   08/10/16 92.5 kg (204 lb)   01/14/10 88.5 kg (195 lb)   08/31/09 88.5 kg (195 lb)   07/02/09 85.7 kg (189 lb)       LABS  Labs reviewed    MEDICATIONS  Medications reviewed      ASSESSED NUTRITION NEEDS PER APPROVED PRACTICE GUIDELINES:    Dosing Weight (12/30) 78 kg  Estimated Energy Needs: 1698-1951 kcals (25-30 Kcal/Kg)  Justification: maintenance  Estimated Protein Needs:  grams protein (1.2-1.5 g pro/Kg)  Justification: Repletion  Estimated Fluid Needs: 6515-0202  mL (1 mL/Kcal)  Justification: maintenance    MALNUTRITION:  % Weight Loss:  > 5% in 1 month (severe malnutrition)  % Intake:  </= 75% for >/= 1 month (severe malnutrition)  Subcutaneous Fat Loss:  Orbital region - mild depletion  Muscle Loss:  Temporal region - mild depletion and Clavicle bone region - mild depletion  Fluid Retention:  None noted    Malnutrition Diagnosis: Severe malnutrition  In Context of:  Acute illness or injury    NUTRITION DIAGNOSIS:  Inadequate oral intake related to decreased appetite as evidenced by 18% wt loss over the past month      NUTRITION INTERVENTIONS  Recommendations / Nutrition Prescription  Moderate CHO  Nutrition supplement  .      Implementation  Nutrition education ---> Reviewed current diet order.  " Encouraged pt to eat small, frequent meals every 3-4 hrs, even if he doesn't feel hungry.  Will send pt a chocolate Boost Glucose Control at 2pm  .      Nutrition Goals  Pt to consume 75% meals and 100% of the supplement being sent  .      MONITORING AND EVALUATION:  Progress towards goals will be monitored and evaluated per protocol and Practice Guidelines

## 2019-12-31 NOTE — ED NOTES
"[facility]  ED Nurse Handoff Report    ED Chief complaint: Shortness of Breath      ED Diagnosis:   Final diagnoses:   None       Code Status: Full Code    Allergies:   Allergies   Allergen Reactions     No Known Drug Allergies        Activity level - Baseline/Home:  Independent  Activity Level - Current:   Stand with Assist    Patient's Preferred language: English   Needed?: No    Isolation: No  Infection: Not Applicable  Bariatric?: No    Vital Signs:   Vitals:    12/30/19 1416   BP: (!) 202/98   Temp: 97  F (36.1  C)   TempSrc: Temporal   SpO2: 99%   Weight: 78 kg (172 lb)   Height: 1.765 m (5' 9.5\")       Cardiac Rhythm: ,   Cardiac  Cardiac Rhythm: Atrial fibrillation    Pain level: 0-10 Pain Scale: 0    Is this patient confused?: No   Does this patient have a guardian?  No         If yes, is there guardianship documents in the Epic \"Code/ACP\" activity?  N/A         Guardian Notified?  N/A  [place] - Suicide Severity Rating Scale Completed?  Yes  If yes, what color did the patient score?  White    Patient Report: Initial Complaint: Tc Garcia is a 79 year old eho presents to the Emergency Department for an evaluation of shortness of breath.  Focused Assessment: Cardiac: WDL  Resp: SOB, LUCIANO  Neuro: A&Ox4, HA, increased weakness in bilat LE  Tests Performed: labs, imaging  Abnormal Results: See labs and imaging  Treatments provided: NA    Family Comments: family at bedside    OBS brochure/video discussed/provided to patient/family: No              Name of person given brochure if not patient: na              Relationship to patient: na    ED Medications: Medications - No data to display    Drips infusing?:  No    For the majority of the shift this patient was Green.   Interventions performed were monitor.    Severe Sepsis OR Septic Shock Diagnosis Present: No    To be done/followed up on inpatient unit:  continue care    ED NURSE PHONE NUMBER: *[phone]         "

## 2019-12-31 NOTE — PROGRESS NOTES
Patient has been assessed for Home Oxygen needs. Oxygen readings:    *Pulse oximetry (SpO2) = 95% on room air at rest while awake.        *SpO2 = 92% on room air during activity/with exercise.

## 2019-12-31 NOTE — PROGRESS NOTES
Discharge instructions reviewed with pt and his family.  IV removed.  Pt left the unit via wheelchair at 1645 to awaiting transportation.  Denied pain at time of discharge.

## 2019-12-31 NOTE — PROGRESS NOTES
Care Suites Procedure Nursing Note    Procedure: thoracentesis performed after consent and time out done.2200 ml of clear yellow  fluid removed from left side.VSS pt tolerated well, no post CXR ordered.Dressing applied to left side back. Report called to AUGUSTIN Alfaro on station 66. Pt returned to room via cart by LINDSEY.  Procedure started time: 0955  Procedure completed time:   Concerns/abnormal assessment:no   Plan: proceed

## 2019-12-31 NOTE — H&P
[facility]    History and Physical - Hospitalist Service       Date of Admission:  12/30/2019    Assessment & Plan   Tc Garcia is a 79 year old male with a history of HTN, DM type II, HLP and paroxysmal atrial fibrillation who is on Eliquis who presented to the ED on 12/30/2019 with shortness of breath and was found to have a left sided pleural effusion.  Of note, the patient was recently admitted to the hospital from 10/30 to 11/10 after a mechanical fall where he sustained left sided rib fractures with a moderate left hemothorax    Left sided pleural effusion   Acute hypoxic respiratory failure  Recent fall with left sided rib fractures and moderate hemothorax  Presented with 2-3 days worth of worsening SOB.  CXR in the ED showed a moderate to large left sided pleural effusion.  Unclear etiology at this time.  May represent recurrence of hemothorax.  Given smoking history and recent weight loss malignancy also on the differential.  Of note, does have ane elevated BNP of 3,634 but this appears to be chronically elevated.    In the ED the patient was noted to be hypoxic and is currently requiring 4 L of O.    - Admission to medical bed  - Titrate O2 as tolerated  - Holding Eliquis  - US thoracentesis with labs ordered     Recent 30# weight loss  Since the patient's fall at the end of October has lost 30 pounds unintentionally.  He states that he has not just had any appetite.  Does have a history of smoking 20 years ago so malignancy is on the differential.    - Nutrition consulted  - Cytology of pleural fluid ordered  - May need age appropriate cancer screening as an outpatient     Paroxysmal atrial fibrillation   HTN   HLP   Current in NSR on my evaluation but was in atrial flutter earlier in the evening on arrival.   - Holding Eliquis   - PTA Clonidine 0.3 mg BID, Lasix 20 mg, Metoprolol 37.5 mg BID, Verapamil  mg   - PTA Lovastatin     DM type II   Last HgbA1c from 10/31 was 7.7   -  Holding PTA Metformin and Actos  - SSI        Diet: NPO at midnight  DVT Prophylaxis: Pneumatic Compression Devices  Pruett Catheter: not present  Code Status: Full code    Disposition Plan   Expected discharge: 2 - 3 days, recommended to prior living arrangement once work up complete.  Entered: Lam Triplett DO 12/30/2019, 7:11 PM     The patient's care was discussed with the Patient, Patient's Family and ED physician.    Lam Triplett DO  [facility]    ______________________________________________________________________    Chief Complaint   SOB     History is obtained from the patient    History of Present Illness   Tc Garcia is a 79 year old male with a history of HTN, DM type II, HLP and paroxysmal atrial fibrillation who is on Eliquis who presented to the ED on 12/30/2019 with shortness of breath and was found to have a left sided pleural effusion.  He states he first noticed it 2-3 days ago and states it has been progressively worsening.  Of note, the patient was recently admitted to the hospital from 10/30 to 11/10 after a mechanical fall where he sustained left sided rib fractures with a moderate left hemothorax.  He denies any further injuries though.  No fevers, chills, chest pain or cough.  He also denies any abdominal pain, N/V/D, difficulties with urination, leg pain or leg swelling.     Of note, the patient also reports a 30 pound weight loss since his hospitalization.  He states that he has had no appetite and food has not been tasting good to home.  His wife also reports that the patient has not been sleeping well during that time.  No changes in mood though.     Review of Systems    The 10 point Review of Systems is negative other than noted in the HPI    Past Medical History    I have reviewed this patient's medical history and updated it with pertinent information if needed.   Past Medical History:   Diagnosis Date     Diabetic PROTEINURIA 2003     Mixed  hyperlipidemia      Personal history of colonic polyps 1972    gets colonoscopy every five years, due in      Rosacea      Type II or unspecified type diabetes mellitus without mention of complication, not stated as uncontrolled      Unspecified essential hypertension        Past Surgical History   I have reviewed this patient's surgical history and updated it with pertinent information if needed.  Past Surgical History:   Procedure Laterality Date     HC REMOVE TONSILS/ADENOIDS,<10 Y/O      T & A <11y.o.       Social History   I have reviewed this patient's social history and updated it with pertinent information if needed.  Social History     Tobacco Use     Smoking status: Former Smoker     Packs/day: 0.20     Years: 40.00     Pack years: 8.00     Types: Cigarettes     Last attempt to quit: 2008     Years since quittin.0     Smokeless tobacco: Never Used     Tobacco comment: occasional   Substance Use Topics     Alcohol use: Not Currently     Alcohol/week: 35.0 standard drinks     Drug use: Not Currently       Family History   I have reviewed this patient's family history and updated it with pertinent information if needed.   Family History   Problem Relation Age of Onset     Family History Negative Mother          age 71     Family History Negative Father          age 70     Diabetes Brother         alive age 77     Diabetes Brother         alive age 76     Family History Negative Brother              Family History Negative Brother              Diabetes Sister         alive age74     Family History Negative Sister          age 86     Heart Disease Sister              Family History Negative Sister              Family History Negative Sister              Diabetes Sister      Diabetes Sister      Diabetes Brother      Diabetes Brother      Prior to Admission Medications   Prior to Admission Medications   Prescriptions  Last Dose Informant Patient Reported? Taking?   ACCU-CHEK COMPACT TEST DRUM VI STRP   No No   Sig: check blood sugar three times a day and PRN   INSULIN PUMP - OUTPATIENT   Yes No   Sig: Novolog Insulin  BASAL RATES and times:  All day: 0.95 units/hour    CARB RATIO: 1 unit per 15 grams  Correction Factor (Sensitivity): 55mg/dL  BLOOD GLUCOSE TARGET and times:  12   AM (midnight): 100 - 140  5:30     AM:  100 - 120  10   PM:  100 - 140  Active Insulin Time:  5 hours     (Per Rx, pt uses 50-60 units/day)   LOVASTATIN 20 MG PO TABS   No No   Si TABLET DAILY AT DINNER   Metoprolol Tartrate 37.5 MG TABS   Yes No   Sig: Take 37.5 mg by mouth 2 times daily   apixaban ANTICOAGULANT (ELIQUIS) 5 MG tablet   No No   Sig: Take 1 tablet (5 mg) by mouth 2 times daily   cloNIDine (CATAPRES) 0.3 MG tablet   Yes No   Sig: Take 0.3 mg by mouth 2 times daily   furosemide (LASIX) 20 MG tablet   No No   Sig: TAKE 1 TABLET BY MOUTH EVERY DAY   glucagon 1 MG kit   Yes No   Si mg as needed for low blood sugar   insulin aspart (NOVOLOG PEN) 100 UNIT/ML pen   Yes No   Sig: Inject as per sliding scale:if 200 - 224 = 1 unit;225 - 249 = 2 units;250 - 274 = 3 units ;275 - 299 = 4 units;300 - 324 = 5 units ;325 - 349 = 6 units ;350+ = 7 units ,subcutaneously before meals for DM2   insulin glargine (LANTUS PEN) 100 UNIT/ML pen   Yes No   Sig: Inject 6 unit subcutaneously two times a day for DM2 HBA1c goal <7%   melatonin 3 MG tablet   No No   Sig: Take 1 tablet (3 mg) by mouth nightly as needed for sleep   metFORMIN (GLUCOPHAGE) 500 MG tablet   Yes No   Sig: Take 500 mg by mouth 2 times daily (with meals)   metoprolol tartrate (LOPRESSOR) 25 MG tablet   No No   Sig: TAKE 1 AND 1/2 TABLETS BY MOUTH TWICE DAILY   minocycline (MINOCIN/DYNACIN) 100 MG capsule   Yes No   Sig: Take 100 mg by mouth 2 times daily    pantoprazole (PROTONIX) 20 MG EC tablet   No No   Sig: Take 1 tablet (20 mg) by mouth daily   pioglitazone (ACTOS) 30 MG tablet    Yes No   Sig: Take 30 mg by mouth daily   potassium chloride ER (MICRO-K) 10 MEQ CR capsule   No No   Sig: TAKE 1 CAPSULE BY MOUTH EVERY DAY   traZODone (DESYREL) 50 MG tablet   Yes No   Sig: Take 25 mg by mouth At Bedtime   triamcinolone (KENALOG) 0.1 % external ointment   Yes No   Sig: Apply topically daily as needed for irritation   verapamil ER (CALAN-SR) 120 MG CR tablet   No No   Sig: Take 1 tablet (120 mg) by mouth At Bedtime      Facility-Administered Medications: None     Allergies   Allergies   Allergen Reactions     No Known Drug Allergies        Physical Exam   Vital Signs: Temp: 97  F (36.1  C) Temp src: Temporal BP: (!) 202/98   Heart Rate: 92 Resp: 22 SpO2: 99 % O2 Device: Nasal cannula Oxygen Delivery: (P) 6 LPM  Weight: 172 lbs 0 oz    General Appearance: Resting comfortably.  NAD   Eyes: EOMI.  Normal conjuctiva  HEENT: NC/AT.  Moist mucous membranes  Respiratory: Diminished breath sounds to left base.  No respiratory distress on NC   Cardiovascular: RRR.  No obvious murmurs  GI: Bowel sounds present.  Non-tende  Skin: No rashes.  No cyanosis  Musculoskeletal: No edema.  No calf tenderness  Neurologic: No focal deficits.  CN appear intact  Psychiatric: Alert.  Pleasant     Data   Data reviewed today: I reviewed all medications, new labs and imaging results over the last 24 hours. I personally reviewed   CXR:  Large left pleural effusion   EKG:  Atrial flutter with variable AV conduction.  Rate 76 BPM     Recent Labs   Lab 12/30/19  1714   WBC 13.6*   HGB 12.5*   MCV 86         POTASSIUM 3.8   CHLORIDE 105   CO2 26   BUN 11   CR 0.98   ANIONGAP 9   LLOYD 9.6   *   TROPI 0.027     Recent Results (from the past 24 hour(s))   Chest XR,  PA & LAT    Narrative    XR CHEST 2 VW   12/30/2019 4:36 PM     HISTORY: sob, rib fractures    COMPARISON: 11/8/2019      Impression    IMPRESSION: Moderate to large left-sided pleural effusion has  increased since prior exam. Superimposed left  retrocardiac pneumonia  cannot be excluded. Compressive atelectasis of the left lung. Multiple  left-sided rib fractures are present and appear similar to prior  study. Right lung is clear. Multilevel degenerative changes seen in  the spine. Calcification seen overlying the aortic knob.    NEGAR MARK MD

## 2019-12-31 NOTE — PLAN OF CARE
A&Ox4. VSS on 2L O2 NC ex hypertensive and tachy at times. Up SBA. Tolerating Reg diet, then made NPO for thoracentesis tomorrow. IV SL. Denies pain, nausea, chest pain. Endorses some SOB, but at rest feels better. Skin bruised with some scabs on L knee and some blanchable redness on dorsal R foot. Pt has insulin pump in place, MD notified- see note. Continue to monitor.

## 2019-12-31 NOTE — PROGRESS NOTES
RECEIVING UNIT ED HANDOFF REVIEW    ED Nurse Handoff Report was reviewed by: Maria T Arellano RN on December 30, 2019 at 7:31 PM

## 2019-12-31 NOTE — DISCHARGE SUMMARY
[facility]  Hospitalist Discharge Summary       Date of Admission:  12/30/2019  Date of Discharge:  12/31/2019  Discharging Provider: Lam Triplett DO      Discharge Diagnoses   1. Left sided pleural effusion, transudative  2. Acute hypoxic respiratory failure due to #1  3. Recent fall with left sided rib fractures and moderate hemothorax  4. Recent 30# weight loss with severe malnutrition  5. Paroxysmal atrial fibrillation   6. HTN   7. HLP    8. DM type II       Follow-ups Needed After Discharge   Follow-up Appointments     Follow-up and recommended labs and tests       Follow up with primary care provider, Senthil Moya, within 1-3 weeks,   for hospital follow- up. The following labs/tests are recommended: Further   evaluation of the 30 pound weight loss.           Unresulted Labs Ordered in the Past 30 Days of this Admission     Date and Time Order Name Status Description    12/30/2019 2000 Cytology non gyn In process     12/30/2019 2000 Gram stain In process     12/30/2019 2000 Fluid Culture Aerobic Bacterial In process       These results will be followed up by PCP     Discharge Disposition   Discharged to home  Condition at discharge: Stable    Hospital Course   Left sided pleural effusion   Acute hypoxic respiratory failure  Recent fall with left sided rib fractures and moderate hemothorax  Presented with 2-3 days worth of worsening SOB.  CXR in the ED showed a moderate to large left sided pleural effusion.  Unclear etiology at this time.  May represent recurrence of hemothorax.  Given smoking history and recent weight loss malignancy also on the differential.  Of note, does have ane elevated BNP of 3,634 but this appears to be chronically elevated.    In the ED the patient was noted to be hypoxic and is currently requiring 4 L of O.    - Holding Eliquis  - US thoracentesis was done with removal of 2200 mL.  Was able to titrate off of O2.  Studies consistent with transuditive       Recent 30# weight loss  Since the patient's fall at the end of October has lost 30 pounds unintentionally.  He states that he has not just had any appetite.  Does have a history of smoking 20 years ago so malignancy is on the differential.    - Nutrition consulted  - Cytology of pleural fluid ordered  - Offered patient further evaluation while here but he preferred discharge with follow up        Consultations This Hospital Stay   NUTRITION SERVICES ADULT IP CONSULT  PHARMACY IP CONSULT    Code Status   Full Code    Time Spent on this Encounter   I, Lam Triplett DO, personally saw the patient today and spent less than or equal to 30 minutes discharging this patient.       Lam Triplett DO  [facility]  ______________________________________________________________________    Physical Exam   Vital Signs: Temp: 98.6  F (37  C) Temp src: Oral BP: (!) 156/82 Pulse: 50 Heart Rate: 76 Resp: 20 SpO2: 97 % O2 Device: None (Room air) Oxygen Delivery: 1 LPM  Weight: 172 lbs 0 oz  General Appearance: Resting comfortably.  NAD   Respiratory: Clear to auscultation.  No respiratory distress  Cardiovascular: RRR.  No murmurs  GI: Bowel sounds present.  Non-tender  Skin: No rashes.  No cyanosis  Other: No edema.  No calf tenderness         Primary Care Physician   Senthil Moya    Discharge Orders      Reason for your hospital stay    Pleural effusion (fluid around the lung) that was drained with a needle.     Follow-up and recommended labs and tests     Follow up with primary care provider, Senthil Moya, within 1-3 weeks, for hospital follow- up. The following labs/tests are recommended: Further evaluation of the 30 pound weight loss.     Activity    Your activity upon discharge: activity as tolerated     Diet    Follow this diet upon discharge: Orders Placed This Encounter      Snacks/Supplements Adult: Other; 2pm: chocolate Boost Glucose Control (RD); Between Meals      Moderate Consistent  CHO Diet       Significant Results and Procedures   Most Recent 3 CBC's:  Recent Labs   Lab Test 12/30/19  1714 11/15/19 11/10/19  0750   WBC 13.6* 13.2* 13.5*   HGB 12.5* 9.3* 9.7*   MCV 86 97.8 93    421 300     Most Recent 3 BMP's:  Recent Labs   Lab Test 12/30/19  1714 11/21/19 11/18/19    143 143   POTASSIUM 3.8 3.3* 4.3   CHLORIDE 105 102 102   CO2 26 31* 25   BUN 11 16 20   CR 0.98 1.26* 1.31*   ANIONGAP 9 10 16   LLOYD 9.6 9.0 9.0   * 154* 341*   ,   Results for orders placed or performed during the hospital encounter of 12/30/19   Chest XR,  PA & LAT    Narrative    XR CHEST 2 VW   12/30/2019 4:36 PM     HISTORY: sob, rib fractures    COMPARISON: 11/8/2019      Impression    IMPRESSION: Moderate to large left-sided pleural effusion has  increased since prior exam. Superimposed left retrocardiac pneumonia  cannot be excluded. Compressive atelectasis of the left lung. Multiple  left-sided rib fractures are present and appear similar to prior  study. Right lung is clear. Multilevel degenerative changes seen in  the spine. Calcification seen overlying the aortic knob.    NEGAR MARK MD   US Thoracentesis    Narrative     EXAM: US THORACENTESIS       12/31/2019 10:26 AM       HISTORY: Left pleural effusion      PROCEDURE:  Written informed consent was obtained from the patient  prior to the procedure. The risks and benefits including bleeding,  infection and pneumothorax were discussed and the patient wished to  continue. Initial ultrasound images demonstrated a left pleural fluid  collection. The skin overlying this collection was marked, prepped,  draped and anesthetized in usual sterile fashion utilizing 10mL 1%  lidocaine.  Using ultrasound guidance, the thoracentesis catheter was  then advanced into the pleural fluid collection with return of  2200  mL of straw-colored fluid. Patient tolerated the procedure well.  Followup chest x-ray was ordered.      US guidance was utilized to enter  the left pleural space for  thoracentesis with permanent image recoding.      Impression    IMPRESSION:  Ultrasound-guided left thoracentesis.     ANUEL HIGGINS PA-C       Discharge Medications   Current Discharge Medication List      CONTINUE these medications which have NOT CHANGED    Details   apixaban ANTICOAGULANT (ELIQUIS) 5 MG tablet Take 1 tablet (5 mg) by mouth 2 times daily    Associated Diagnoses: Chronic atrial fibrillation      cloNIDine (CATAPRES) 0.3 MG tablet Take 0.3 mg by mouth 2 times daily      furosemide (LASIX) 20 MG tablet TAKE 1 TABLET BY MOUTH EVERY DAY  Qty: 90 tablet, Refills: 0    Comments: **Patient requests 90 days supply**  Associated Diagnoses: Essential hypertension      glucagon 1 MG kit 1 mg as needed for low blood sugar      INSULIN PUMP - OUTPATIENT Novolog Insulin  BASAL RATES and times:  Midnight to 6am: 0.65 units/hr  6am to 10:30 pm: 0.95 units/hour    10:30pm to midnight: 0.55 units/hr  CARB RATIO: 1 unit per 15 grams  Correction Factor (Sensitivity): 55mg/dL  BLOOD GLUCOSE TARGET and times:  12   AM (midnight): 100 - 140  5:30     AM:  100 - 120  10   PM:  100 - 140  Active Insulin Time:  5 hours   Bolus-Correction 1 unit(s) to lower blood glucose by 55 mg/dL  Checks insulin about 4-5 times a day manually  (Per Rx, pt uses 50-60 units/day)      LOVASTATIN 20 MG PO TABS 1 TABLET DAILY AT DINNER  Qty: 90 Tab, Refills: 0    Associated Diagnoses: Mixed hyperlipidemia      metFORMIN (GLUCOPHAGE) 500 MG tablet Take 500 mg by mouth 2 times daily (with meals)      metoprolol tartrate (LOPRESSOR) 25 MG tablet TAKE 1 AND 1/2 TABLETS BY MOUTH TWICE DAILY  Qty: 270 tablet, Refills: 0    Comments: **Patient requests 90 days supply**  Associated Diagnoses: Essential hypertension      pioglitazone (ACTOS) 30 MG tablet Take 30 mg by mouth daily (with breakfast)       potassium chloride ER (MICRO-K) 10 MEQ CR capsule TAKE 1 CAPSULE BY MOUTH EVERY DAY  Qty: 90 capsule, Refills: 0     Comments: **Patient requests 90 days supply**  Associated Diagnoses: Essential hypertension      triamcinolone (KENALOG) 0.1 % external ointment Apply topically daily as needed for irritation      verapamil ER (CALAN-SR) 120 MG CR tablet Take 1 tablet (120 mg) by mouth At Bedtime    Associated Diagnoses: Essential hypertension           Allergies   Allergies   Allergen Reactions     No Known Drug Allergies

## 2019-12-31 NOTE — PROGRESS NOTES
Writer met with the patient at the bedside to discuss role in safe discharge planning.  Patient has orders for discharge today.  Patient is A+Ox4 independent at the bedside.   Patient has ambulated and is doing well on RA s/p 2200 cc removal of fluid.     Patient agreeable to have this writer schedule follow up with PCP and writer also discussed EP follow up (patient had an event monitor and EP to review this with patient).  Patient identifies no further discharge needs at this time. Updated bedside RN with appointment details.  Spouse to arrive shortly for discharge. Hand off submitted to PCP at fax#[phone].    Appointment:    Thursday January 9th at 12:00 p.m. Dr. Charlie Davis located at:   [facility]   [address] [facility] [address], [place] [address]   Phone: [phone]

## 2020-01-02 LAB — COPATH REPORT: NORMAL

## 2020-01-05 LAB
BACTERIA SPEC CULT: NO GROWTH
SPECIMEN SOURCE: NORMAL

## 2020-01-07 ENCOUNTER — OFFICE VISIT (OUTPATIENT)
Dept: CARDIOLOGY | Facility: CLINIC | Age: 80
End: 2020-01-07
Attending: INTERNAL MEDICINE
Payer: COMMERCIAL

## 2020-01-07 ENCOUNTER — HOSPITAL ENCOUNTER (OUTPATIENT)
Dept: GENERAL RADIOLOGY | Facility: CLINIC | Age: 80
Discharge: HOME | End: 2020-01-07
Attending: INTERNAL MEDICINE | Admitting: INTERNAL MEDICINE
Payer: COMMERCIAL

## 2020-01-07 VITALS
DIASTOLIC BLOOD PRESSURE: 85 MMHG | WEIGHT: 174.8 LBS | HEIGHT: 70 IN | BODY MASS INDEX: 25.03 KG/M2 | SYSTOLIC BLOOD PRESSURE: 139 MMHG | HEART RATE: 111 BPM

## 2020-01-07 DIAGNOSIS — J90 PLEURAL EFFUSION: ICD-10-CM

## 2020-01-07 DIAGNOSIS — R06.02 SOB (SHORTNESS OF BREATH): ICD-10-CM

## 2020-01-07 DIAGNOSIS — I51.7 LVH (LEFT VENTRICULAR HYPERTROPHY): ICD-10-CM

## 2020-01-07 DIAGNOSIS — I48.91 ATRIAL FIBRILLATION, UNSPECIFIED TYPE (H): ICD-10-CM

## 2020-01-07 DIAGNOSIS — I48.91 ATRIAL FIBRILLATION, UNSPECIFIED TYPE (H): Primary | ICD-10-CM

## 2020-01-07 DIAGNOSIS — I49.8 BRADYARRHYTHMIA: ICD-10-CM

## 2020-01-07 LAB
ALT SERPL W P-5'-P-CCNC: <5 U/L (ref 5–30)
AST SERPL W P-5'-P-CCNC: 16 U/L (ref 0–45)
TSH SERPL DL<=0.005 MIU/L-ACNC: 2.32 MU/L (ref 0.4–4)

## 2020-01-07 PROCEDURE — 84450 TRANSFERASE (AST) (SGOT): CPT | Performed by: INTERNAL MEDICINE

## 2020-01-07 PROCEDURE — 84460 ALANINE AMINO (ALT) (SGPT): CPT | Performed by: INTERNAL MEDICINE

## 2020-01-07 PROCEDURE — 84443 ASSAY THYROID STIM HORMONE: CPT | Performed by: INTERNAL MEDICINE

## 2020-01-07 PROCEDURE — 71046 X-RAY EXAM CHEST 2 VIEWS: CPT

## 2020-01-07 PROCEDURE — 93000 ELECTROCARDIOGRAM COMPLETE: CPT | Performed by: INTERNAL MEDICINE

## 2020-01-07 PROCEDURE — 99215 OFFICE O/P EST HI 40 MIN: CPT | Performed by: INTERNAL MEDICINE

## 2020-01-07 PROCEDURE — 36415 COLL VENOUS BLD VENIPUNCTURE: CPT | Performed by: INTERNAL MEDICINE

## 2020-01-07 RX ORDER — AMIODARONE HYDROCHLORIDE 200 MG/1
200 TABLET ORAL 2 TIMES DAILY
Qty: 90 TABLET | Refills: 3 | Status: ON HOLD | OUTPATIENT
Start: 2020-01-07 | End: 2020-04-25

## 2020-01-07 ASSESSMENT — MIFFLIN-ST. JEOR: SCORE: 1509.14

## 2020-01-07 NOTE — LETTER
1/7/2020    Senthil Moya MD  [address]  [facility] [address]    RE: Tc Garcia       Dear Colleague,    I had the pleasure of seeing Tc Garcia in the [facility].    HPI and Plan:   See dictation  [id number]  Orders Placed This Encounter   Procedures     X-ray Chest 2 vws*     AST     ALT     TSH     EKG 12-lead complete w/read - Clinics       No orders of the defined types were placed in this encounter.      There are no discontinued medications.      Encounter Diagnoses   Name Primary?     Atrial fibrillation, unspecified type (H) Yes     Bradyarrhythmia      LVH (left ventricular hypertrophy)      SOB (shortness of breath)      Pleural effusion        CURRENT MEDICATIONS:  Current Outpatient Medications   Medication Sig Dispense Refill     apixaban ANTICOAGULANT (ELIQUIS) 5 MG tablet Take 1 tablet (5 mg) by mouth 2 times daily       cloNIDine (CATAPRES) 0.3 MG tablet Take 0.3 mg by mouth 2 times daily       glucagon 1 MG kit 1 mg as needed for low blood sugar       INSULIN PUMP - OUTPATIENT Novolog Insulin  BASAL RATES and times:  Midnight to 6am: 0.65 units/hr  6am to 10:30 pm: 0.95 units/hour    10:30pm to midnight: 0.55 units/hr  CARB RATIO: 1 unit per 15 grams  Correction Factor (Sensitivity): 55mg/dL  BLOOD GLUCOSE TARGET and times:  12   AM (midnight): 100 - 140  5:30     AM:  100 - 120  10   PM:  100 - 140  Active Insulin Time:  5 hours   Bolus-Correction 1 unit(s) to lower blood glucose by 55 mg/dL  Checks insulin about 4-5 times a day manually  (Per Rx, pt uses 50-60 units/day)       LOVASTATIN 20 MG PO TABS 1 TABLET DAILY AT DINNER 90 Tab 0     metFORMIN (GLUCOPHAGE) 500 MG tablet Take 500 mg by mouth 2 times daily (with meals)       pioglitazone (ACTOS) 30 MG tablet Take 30 mg by mouth daily (with breakfast)        triamcinolone (KENALOG) 0.1 % external ointment Apply topically daily as needed for irritation       verapamil ER (CALAN-SR) 120 MG CR tablet  Take 1 tablet (120 mg) by mouth At Bedtime (Patient taking differently: Take 240 mg by mouth every morning )       furosemide (LASIX) 20 MG tablet TAKE 1 TABLET BY MOUTH EVERY DAY 90 tablet 0     metoprolol tartrate (LOPRESSOR) 25 MG tablet TAKE 1 AND 1/2 TABLETS BY MOUTH TWICE DAILY 270 tablet 0     potassium chloride ER (MICRO-K) 10 MEQ CR capsule TAKE 1 CAPSULE BY MOUTH EVERY DAY 90 capsule 0       ALLERGIES     Allergies   Allergen Reactions     No Known Drug Allergies        PAST MEDICAL HISTORY:  Past Medical History:   Diagnosis Date     Diabetic PROTEINURIA      Mixed hyperlipidemia      Personal history of colonic polyps 1972    gets colonoscopy every five years, due in      Rosacea      Type II or unspecified type diabetes mellitus without mention of complication, not stated as uncontrolled      Unspecified essential hypertension        PAST SURGICAL HISTORY:  Past Surgical History:   Procedure Laterality Date     HC REMOVE TONSILS/ADENOIDS,<13 Y/O      T & A <11y.o.       FAMILY HISTORY:  Family History   Problem Relation Age of Onset     Family History Negative Mother          age 71     Family History Negative Father          age 70     Diabetes Brother         alive age 77     Diabetes Brother         alive age 76     Family History Negative Brother              Family History Negative Brother              Diabetes Sister         alive age76     Family History Negative Sister          age 86     Heart Disease Sister              Family History Negative Sister              Family History Negative Sister              Diabetes Sister      Diabetes Sister      Diabetes Brother      Diabetes Brother        SOCIAL HISTORY:  Social History     Socioeconomic History     Marital status:      Spouse name: Lianna     Number of children: 4     Years of education: 16     Highest education level: None   Occupational History  "    Occupation: COURRIER     Employer: [employer]    Social Needs     Financial resource strain: None     Food insecurity:     Worry: None     Inability: None     Transportation needs:     Medical: None     Non-medical: None   Tobacco Use     Smoking status: Former Smoker     Packs/day: 0.20     Years: 40.00     Pack years: 8.00     Types: Cigarettes     Last attempt to quit: 2008     Years since quittin.0     Smokeless tobacco: Never Used     Tobacco comment: occasional   Substance and Sexual Activity     Alcohol use: Not Currently     Alcohol/week: 35.0 standard drinks     Drug use: Not Currently     Sexual activity: None   Lifestyle     Physical activity:     Days per week: None     Minutes per session: None     Stress: None   Relationships     Social connections:     Talks on phone: None     Gets together: None     Attends Latter-day service: None     Active member of club or organization: None     Attends meetings of clubs or organizations: None     Relationship status: None     Intimate partner violence:     Fear of current or ex partner: None     Emotionally abused: None     Physically abused: None     Forced sexual activity: None   Other Topics Concern     Parent/sibling w/ CABG, MI or angioplasty before 65F 55M? Not Asked   Social History Narrative     None       Review of Systems:  Skin:  Negative       Eyes:  Positive for glasses    ENT:  Negative      Respiratory:  Positive for shortness of breath;dyspnea on exertion(sob with walking)     Cardiovascular:  Negative      Gastroenterology: Negative      Genitourinary:  Positive for urinary frequency    Musculoskeletal:  Negative      Neurologic:  Negative      Psychiatric:  Positive for sleep disturbances    Heme/Lymph/Imm:  Negative      Endocrine:  Positive for diabetes      Physical Exam:  Vitals: /85   Pulse 111   Ht 1.778 m (5' 10\")   Wt 79.3 kg (174 lb 12.8 oz)   BMI 25.08 kg/m       Constitutional:  cooperative, " alert and oriented, well developed, well nourished, in no acute distress        Skin:  warm and dry to the touch, no apparent skin lesions or masses noted          Head:  normocephalic, no masses or lesions        Eyes:  pupils equal and round, conjunctivae and lids unremarkable, sclera white, no xanthalasma, EOMS intact, no nystagmus        Lymph:No Cervical lymphadenopathy present     ENT:  no pallor or cyanosis, dentition good        Neck:  carotid pulses are full and equal bilaterally, JVP normal, no carotid bruit        Respiratory:    diminished breath sounds left sided       Cardiac:   irregularly irregular rhythm;tachycardic              pulses full and equal, no bruits auscultated                                        GI:  abdomen soft, non-tender, BS normoactive, no mass, no HSM, no bruits        Extremities and Muscular Skeletal:  no deformities, clubbing, cyanosis, erythema observed              Neurological:  no gross motor deficits        Psych:  Alert and Oriented x 3        CC  Cony Julien, DO  [address]  [place] [address]                Service Date: 01/07/2020      HISTORY OF PRESENT ILLNESS:  Thank you for allowing me to participate in the care of this very delightful patient.  As you know, Mr. Garcia is an 79-year-old gentleman with a history of hypertension and asymptomatic paroxysmal atrial fibrillation who I had the pleasure of meeting for the first time when he was hospitalized this past October for a mechanical fall.  Unfortunately, he suffered multiple injuries including C3 and T2 fracture along with multiple fractured ribs and pneumothorax.  Fortunately, he did not require surgery but with continuation of conservative therapy, he has been improving slowly.      When I saw him, he had bouts of severe sinus bradycardia at 6:00 in the morning when he was sleeping with a junctional escape at 45 beats per minute.  He was diagnosed with atrial fibrillation a few months  prior to that admission and was put on rate control strategy along with Eliquis.  The patient was taking verapamil at 240 mg daily along with Imdur at 60 mg daily.  At that time, I recommended to continue rate control strategy by holding verapamil but increasing the metoprolol.  He is known to have normal LV function but does have LVH from his hypertension.     The patient subsequently spent some time at [place], a long-term care facility, and saw my partner, Dr. Julien, for followup.  Dr. Julien did recommend Zio Patch monitor, in light of his known severe sinus bradycardia.  The monitor showed that he had been in AF 91% of the time with an average of 56 and the range varied between . He reports no symptoms at that time.      Around New Year's Eve, the patient did undergo a left-sided thoracocentesis for a left-sided pleural effusion.  They drained out 2 liters.  He did not feel any better afterwards.  He did complain of increasing shortness of breath prior to the tap.        I was asked to see the patient again because of his ongoing issues with atrial arrhythmias.      The patient again denies having palpitations but does feel short of breath just by walking about half a block or so.  EKG did demonstrate atrial fibrillation with ventricular rate of 111 beats per minute.      His symptoms of increasing shortness of breath with exertion appeared to correlate with the increasing atrial fibrillation burden along with evidence of recurrent left-sided pleural effusion, according to my examination.  I think it would be best to switch to rhythm control strategy.  I would like to obtain a TSH, ALT and AST level today prior to starting amiodarone and stop his verapamil given history of severe sinus bradycardia in the past. The patient did miss a dose of Eliquis for his thoracocentesis more than a week ago and therefore, I would like him to be on at least 2 more weeks to complete at least 3 weeks' worth of Eliquis  noninterrupted prior to proceeding with cardioversion.  I did warn him the fact that he has evidence of sinus node disease and after the cardioversion, should he have severe sinus bradycardia, pacemaker may be needed.  We will contact the patient regarding results of laboratory work today along with x-ray and would like to be on amiodarone, provided all the lab work comes back normal and stopping his verapamil.  We also schedule a cardioversion.  I went over the procedure in detail with risks including but not limited to CVA and respiratory distress.  I did emphasize the importance of taking Eliquis every day without missing a dose.  If he does miss a dose, he will contact us to let us know.  I also went over the side effects of amiodarone including inflammation of thyroid, liver and lungs as well. In theory, taking amiodarone now can convert his AF but the chance of that is quite low especially at a low loading dose. Patient understood and accepted this risk of chemical conversion could be associated with CVA.     cc:   [facility]   [address]   [place] [address]         ARACELY CORREA MD             D: 2020   T: 2020   MT: DW      Name:     CEDRICK PHILLIPS   MRN:      [id number]        Account:      [id number]   :      1940           Service Date: 2020      Document: [id number]        Thank you for allowing me to participate in the care of your patient.      Sincerely,     Aracely Paredes MD     [facility]    cc:   Cony Julien,   [address]  [place] [id number]

## 2020-01-07 NOTE — PROGRESS NOTES
HPI and Plan:   See dictation  [id number]  Orders Placed This Encounter   Procedures     X-ray Chest 2 vws*     AST     ALT     TSH     EKG 12-lead complete w/read - Clinics       No orders of the defined types were placed in this encounter.      There are no discontinued medications.      Encounter Diagnoses   Name Primary?     Atrial fibrillation, unspecified type (H) Yes     Bradyarrhythmia      LVH (left ventricular hypertrophy)      SOB (shortness of breath)      Pleural effusion        CURRENT MEDICATIONS:  Current Outpatient Medications   Medication Sig Dispense Refill     apixaban ANTICOAGULANT (ELIQUIS) 5 MG tablet Take 1 tablet (5 mg) by mouth 2 times daily       cloNIDine (CATAPRES) 0.3 MG tablet Take 0.3 mg by mouth 2 times daily       glucagon 1 MG kit 1 mg as needed for low blood sugar       INSULIN PUMP - OUTPATIENT Novolog Insulin  BASAL RATES and times:  Midnight to 6am: 0.65 units/hr  6am to 10:30 pm: 0.95 units/hour    10:30pm to midnight: 0.55 units/hr  CARB RATIO: 1 unit per 15 grams  Correction Factor (Sensitivity): 55mg/dL  BLOOD GLUCOSE TARGET and times:  12   AM (midnight): 100 - 140  5:30     AM:  100 - 120  10   PM:  100 - 140  Active Insulin Time:  5 hours   Bolus-Correction 1 unit(s) to lower blood glucose by 55 mg/dL  Checks insulin about 4-5 times a day manually  (Per Rx, pt uses 50-60 units/day)       LOVASTATIN 20 MG PO TABS 1 TABLET DAILY AT DINNER 90 Tab 0     metFORMIN (GLUCOPHAGE) 500 MG tablet Take 500 mg by mouth 2 times daily (with meals)       pioglitazone (ACTOS) 30 MG tablet Take 30 mg by mouth daily (with breakfast)        triamcinolone (KENALOG) 0.1 % external ointment Apply topically daily as needed for irritation       verapamil ER (CALAN-SR) 120 MG CR tablet Take 1 tablet (120 mg) by mouth At Bedtime (Patient taking differently: Take 240 mg by mouth every morning )       furosemide (LASIX) 20 MG tablet TAKE 1 TABLET BY MOUTH EVERY DAY 90 tablet 0     metoprolol tartrate  (LOPRESSOR) 25 MG tablet TAKE 1 AND 1/2 TABLETS BY MOUTH TWICE DAILY 270 tablet 0     potassium chloride ER (MICRO-K) 10 MEQ CR capsule TAKE 1 CAPSULE BY MOUTH EVERY DAY 90 capsule 0       ALLERGIES     Allergies   Allergen Reactions     No Known Drug Allergies        PAST MEDICAL HISTORY:  Past Medical History:   Diagnosis Date     Diabetic PROTEINURIA      Mixed hyperlipidemia      Personal history of colonic polyps 1972    gets colonoscopy every five years, due in      Rosacea      Type II or unspecified type diabetes mellitus without mention of complication, not stated as uncontrolled      Unspecified essential hypertension        PAST SURGICAL HISTORY:  Past Surgical History:   Procedure Laterality Date     HC REMOVE TONSILS/ADENOIDS,<10 Y/O      T & A <11y.o.       FAMILY HISTORY:  Family History   Problem Relation Age of Onset     Family History Negative Mother          age 71     Family History Negative Father          age 70     Diabetes Brother         alive age 77     Diabetes Brother         alive age 76     Family History Negative Brother              Family History Negative Brother              Diabetes Sister         alive age74     Family History Negative Sister          age 86     Heart Disease Sister              Family History Negative Sister              Family History Negative Sister              Diabetes Sister      Diabetes Sister      Diabetes Brother      Diabetes Brother        SOCIAL HISTORY:  Social History     Socioeconomic History     Marital status:      Spouse name: Lianna     Number of children: 4     Years of education: 16     Highest education level: None   Occupational History     Occupation: COURRIER     Employer: [employer]    Social Needs     Financial resource strain: None     Food insecurity:     Worry: None     Inability: None     Transportation needs:     Medical:  "None     Non-medical: None   Tobacco Use     Smoking status: Former Smoker     Packs/day: 0.20     Years: 40.00     Pack years: 8.00     Types: Cigarettes     Last attempt to quit: 2008     Years since quittin.0     Smokeless tobacco: Never Used     Tobacco comment: occasional   Substance and Sexual Activity     Alcohol use: Not Currently     Alcohol/week: 35.0 standard drinks     Drug use: Not Currently     Sexual activity: None   Lifestyle     Physical activity:     Days per week: None     Minutes per session: None     Stress: None   Relationships     Social connections:     Talks on phone: None     Gets together: None     Attends Denominational service: None     Active member of club or organization: None     Attends meetings of clubs or organizations: None     Relationship status: None     Intimate partner violence:     Fear of current or ex partner: None     Emotionally abused: None     Physically abused: None     Forced sexual activity: None   Other Topics Concern     Parent/sibling w/ CABG, MI or angioplasty before 65F 55M? Not Asked   Social History Narrative     None       Review of Systems:  Skin:  Negative       Eyes:  Positive for glasses    ENT:  Negative      Respiratory:  Positive for shortness of breath;dyspnea on exertion(sob with walking)     Cardiovascular:  Negative      Gastroenterology: Negative      Genitourinary:  Positive for urinary frequency    Musculoskeletal:  Negative      Neurologic:  Negative      Psychiatric:  Positive for sleep disturbances    Heme/Lymph/Imm:  Negative      Endocrine:  Positive for diabetes      Physical Exam:  Vitals: /85   Pulse 111   Ht 1.778 m (5' 10\")   Wt 79.3 kg (174 lb 12.8 oz)   BMI 25.08 kg/m      Constitutional:  cooperative, alert and oriented, well developed, well nourished, in no acute distress        Skin:  warm and dry to the touch, no apparent skin lesions or masses noted          Head:  normocephalic, no masses or lesions        Eyes: "  pupils equal and round, conjunctivae and lids unremarkable, sclera white, no xanthalasma, EOMS intact, no nystagmus        Lymph:No Cervical lymphadenopathy present     ENT:  no pallor or cyanosis, dentition good        Neck:  carotid pulses are full and equal bilaterally, JVP normal, no carotid bruit        Respiratory:    diminished breath sounds left sided       Cardiac:   irregularly irregular rhythm;tachycardic              pulses full and equal, no bruits auscultated                                        GI:  abdomen soft, non-tender, BS normoactive, no mass, no HSM, no bruits        Extremities and Muscular Skeletal:  no deformities, clubbing, cyanosis, erythema observed              Neurological:  no gross motor deficits        Psych:  Alert and Oriented x 3        CC  Conyjaden Julien,   [address]  [place] [address]

## 2020-01-07 NOTE — PROGRESS NOTES
Service Date: 01/07/2020      HISTORY OF PRESENT ILLNESS:  Thank you for allowing me to participate in the care of this very delightful patient.  As you know, Mr. Garcia is an 79-year-old gentleman with a history of hypertension and asymptomatic paroxysmal atrial fibrillation who I had the pleasure of meeting for the first time when he was hospitalized this past October for a mechanical fall.  Unfortunately, he suffered multiple injuries including C3 and T2 fracture along with multiple fractured ribs and pneumothorax.  Fortunately, he did not require surgery but with continuation of conservative therapy, he has been improving slowly.      When I saw him, he had bouts of severe sinus bradycardia at 6:00 in the morning when he was sleeping with a junctional escape at 45 beats per minute.  He was diagnosed with atrial fibrillation a few months prior to that admission and was put on rate control strategy along with Eliquis.  The patient was taking verapamil at 240 mg daily along with Imdur at 60 mg daily.  At that time, I recommended to continue rate control strategy by holding verapamil but increasing the metoprolol.  He is known to have normal LV function but does have LVH from his hypertension.     The patient subsequently spent some time at [facility], a long-term care facility, and saw my partner, Dr. Julien, for followup.  Dr. Julien did recommend Zio Patch monitor, in light of his known severe sinus bradycardia.  The monitor showed that he had been in AF 91% of the time with an average of 56 and the range varied between . He reports no symptoms at that time.      Around New Year's Eve, the patient did undergo a left-sided thoracocentesis for a left-sided pleural effusion.  They drained out 2 liters.  He did not feel any better afterwards.  He did complain of increasing shortness of breath prior to the tap.        I was asked to see the patient again because of his ongoing issues with atrial arrhythmias.       The patient again denies having palpitations but does feel short of breath just by walking about half a block or so.  EKG did demonstrate atrial fibrillation with ventricular rate of 111 beats per minute.      His symptoms of increasing shortness of breath with exertion appeared to correlate with the increasing atrial fibrillation burden along with evidence of recurrent left-sided pleural effusion, according to my examination.  I think it would be best to switch to rhythm control strategy.  I would like to obtain a TSH, ALT and AST level today prior to starting amiodarone and stop his verapamil given history of severe sinus bradycardia in the past. The patient did miss a dose of Eliquis for his thoracocentesis more than a week ago and therefore, I would like him to be on at least 2 more weeks to complete at least 3 weeks' worth of Eliquis noninterrupted prior to proceeding with cardioversion.  I did warn him the fact that he has evidence of sinus node disease and after the cardioversion, should he have severe sinus bradycardia, pacemaker may be needed.  We will contact the patient regarding results of laboratory work today along with x-ray and would like to be on amiodarone, provided all the lab work comes back normal and stopping his verapamil.  We also schedule a cardioversion.  I went over the procedure in detail with risks including but not limited to CVA and respiratory distress.  I did emphasize the importance of taking Eliquis every day without missing a dose.  If he does miss a dose, he will contact us to let us know.  I also went over the side effects of amiodarone including inflammation of thyroid, liver and lungs as well. In theory, taking amiodarone now can convert his AF but the chance of that is quite low especially at a low loading dose. Patient understood and accepted this risk of chemical conversion could be associated with CVA.     cc:   [facility]   [address]   [place] [address]         ARACELY CORREA MD             D: 2020   T: 2020   MT: DIDIER      Name:     CEDRICK PHILLIPS   MRN:      [id number]        Account:      [id number]   :      1940           Service Date: 2020      Document: [id number]

## 2020-01-07 NOTE — LETTER
1/7/2020      Senthil Moya MD  [address] [facility] [address]  [facility] [address]      RE: Tc Garcia       Dear Colleague,    I had the pleasure of seeing Tc Garcia in the [facility].    Service Date: 01/07/2020      HISTORY OF PRESENT ILLNESS:  Thank you for allowing me to participate in the care of this very delightful patient.  As you know, Mr. Garcia is an 79-year-old gentleman with a history of hypertension and asymptomatic paroxysmal atrial fibrillation who I had the pleasure of meeting for the first time when he was hospitalized this past October for a mechanical fall.  Unfortunately, he suffered multiple injuries including C3 and T2 fracture along with multiple fractured ribs and pneumothorax.  Fortunately, he did not require surgery but with continuation of conservative therapy, he has been improving slowly.      When I saw him, he had bouts of severe sinus bradycardia at 6:00 in the morning when he was sleeping with a junctional escape at 45 beats per minute.  He was diagnosed with atrial fibrillation a few months prior to that admission and was put on rate control strategy along with Eliquis.  The patient was taking verapamil at 240 mg daily along with Imdur at 60 mg daily.  At that time, I recommended to continue rate control strategy by holding verapamil but increasing the metoprolol.  He is known to have normal LV function but does have LVH from his hypertension.     The patient subsequently spent some time at [facility], a long-term care facility, and saw my partner, Dr. Julien, for followup.  Dr. Julien did recommend Zio Patch monitor, in light of his known severe sinus bradycardia.  The monitor showed that he had been in AF 91% of the time with an average of 56 and the range varied between . He reports no symptoms at that time.      Around New Year's Natty, the patient did undergo a left-sided thoracocentesis for a left-sided pleural effusion.  They drained out 2  liters.  He did not feel any better afterwards.  He did complain of increasing shortness of breath prior to the tap.        I was asked to see the patient again because of his ongoing issues with atrial arrhythmias.      The patient again denies having palpitations but does feel short of breath just by walking about half a block or so.  EKG did demonstrate atrial fibrillation with ventricular rate of 111 beats per minute.      His symptoms of increasing shortness of breath with exertion appeared to correlate with the increasing atrial fibrillation burden along with evidence of recurrent left-sided pleural effusion, according to my examination.  I think it would be best to switch to rhythm control strategy.  I would like to obtain a TSH, ALT and AST level today prior to starting amiodarone and stop his verapamil given history of severe sinus bradycardia in the past. The patient did miss a dose of Eliquis for his thoracocentesis more than a week ago and therefore, I would like him to be on at least 2 more weeks to complete at least 3 weeks' worth of Eliquis noninterrupted prior to proceeding with cardioversion.  I did warn him the fact that he has evidence of sinus node disease and after the cardioversion, should he have severe sinus bradycardia, pacemaker may be needed.  We will contact the patient regarding results of laboratory work today along with x-ray and would like to be on amiodarone, provided all the lab work comes back normal and stopping his verapamil.  We also schedule a cardioversion.  I went over the procedure in detail with risks including but not limited to CVA and respiratory distress.  I did emphasize the importance of taking Eliquis every day without missing a dose.  If he does miss a dose, he will contact us to let us know.  I also went over the side effects of amiodarone including inflammation of thyroid, liver and lungs as well. In theory, taking amiodarone now can convert his AF but the chance  of that is quite low especially at a low loading dose. Patient understood and accepted this risk of chemical conversion could be associated with CVA.     cc:   [facility]   [address]   [place] [address]         ARACELY CORREA MD             D: 2020   T: 2020   MT: DIDIER      Name:     CEDRICK PHILLIPS   MRN:      [id number]        Account:      [id number]   :      1940           Service Date: 2020      Document: [id number]           Outpatient Encounter Medications as of 2020   Medication Sig Dispense Refill     amiodarone (PACERONE/CODARONE) 200 MG tablet Take 1 tablet (200 mg) by mouth 2 times daily For 30 days then 1 tablet (200 mg) daily. 90 tablet 3     apixaban ANTICOAGULANT (ELIQUIS) 5 MG tablet Take 1 tablet (5 mg) by mouth 2 times daily       cloNIDine (CATAPRES) 0.3 MG tablet Take 0.3 mg by mouth 2 times daily       glucagon 1 MG kit 1 mg as needed for low blood sugar       INSULIN PUMP - OUTPATIENT Novolog Insulin  BASAL RATES and times:  Midnight to 6am: 0.65 units/hr  6am to 10:30 pm: 0.95 units/hour    10:30pm to midnight: 0.55 units/hr  CARB RATIO: 1 unit per 15 grams  Correction Factor (Sensitivity): 55mg/dL  BLOOD GLUCOSE TARGET and times:  12   AM (midnight): 100 - 140  5:30     AM:  100 - 120  10   PM:  100 - 140  Active Insulin Time:  5 hours   Bolus-Correction 1 unit(s) to lower blood glucose by 55 mg/dL  Checks insulin about 4-5 times a day manually  (Per Rx, pt uses 50-60 units/day)       LOVASTATIN 20 MG PO TABS 1 TABLET DAILY AT DINNER 90 Tab 0     metFORMIN (GLUCOPHAGE) 500 MG tablet Take 500 mg by mouth 2 times daily (with meals)       pioglitazone (ACTOS) 30 MG tablet Take 30 mg by mouth daily (with breakfast)        triamcinolone (KENALOG) 0.1 % external ointment Apply topically daily as needed for irritation       furosemide (LASIX) 20 MG tablet TAKE 1 TABLET BY MOUTH EVERY DAY 90 tablet 0      potassium chloride ER (MICRO-K) 10 MEQ CR capsule TAKE 1 CAPSULE BY MOUTH EVERY DAY 90 capsule 0     [DISCONTINUED] metoprolol tartrate (LOPRESSOR) 25 MG tablet TAKE 1 AND 1/2 TABLETS BY MOUTH TWICE DAILY 270 tablet 0     [DISCONTINUED] verapamil ER (CALAN-SR) 120 MG CR tablet Take 1 tablet (120 mg) by mouth At Bedtime (Patient taking differently: Take 240 mg by mouth every morning )       No facility-administered encounter medications on file as of 1/7/2020.        Again, thank you for allowing me to participate in the care of your patient.      Sincerely,    Quinton Paredes MD     [facility]

## 2020-01-08 ENCOUNTER — TELEPHONE (OUTPATIENT)
Dept: CARDIOLOGY | Facility: CLINIC | Age: 80
End: 2020-01-08

## 2020-01-08 DIAGNOSIS — I48.91 ATRIAL FIBRILLATION, UNSPECIFIED TYPE (H): Primary | ICD-10-CM

## 2020-01-08 RX ORDER — VERAPAMIL HYDROCHLORIDE 240 MG/1
240 CAPSULE, EXTENDED RELEASE ORAL AT BEDTIME
Start: 2020-01-08 | End: 2020-11-16

## 2020-01-08 NOTE — TELEPHONE ENCOUNTER
"01/08/20 Msg received from Dr Camejo:     Please inform pt that labs are normal. Start amio 200 mg bid for 30 days then daily. Stop Verapamil but continue with metoprolol. Schedule electric cardioversion in 3-4 weeks and see TAMAR after that, thanks, orders are in epic.    Called pt and explained plan. Pt stated he is not currently taking Metoprolol. Verbally discussed w Dr Camejo who stated ,\" If that's the case continue with verapamil.\".    Reviewed med recommendations :  Amiodarone 200 mg BID x 30 days then 200 every day  Verapamil 240 mg every day  Stressed the importance of continuing Eliquis 5 mg BID. Pt instructed to call if he misses any dose.  Tx to scheduling to set up DCCV in 3-4 weeks and f/u w TAMAR 1 mo after DCCV. Pt voiced understanding and agreement w plan. Med list updated. Mary 852 am  "

## 2020-01-24 ENCOUNTER — HOSPITAL ENCOUNTER (EMERGENCY)
Facility: CLINIC | Age: 80
Discharge: HOME | End: 2020-01-24
Attending: EMERGENCY MEDICINE | Admitting: EMERGENCY MEDICINE
Payer: COMMERCIAL

## 2020-01-24 ENCOUNTER — APPOINTMENT (OUTPATIENT)
Dept: GENERAL RADIOLOGY | Facility: CLINIC | Age: 80
End: 2020-01-24
Attending: EMERGENCY MEDICINE
Payer: COMMERCIAL

## 2020-01-24 VITALS
SYSTOLIC BLOOD PRESSURE: 176 MMHG | RESPIRATION RATE: 22 BRPM | WEIGHT: 175 LBS | DIASTOLIC BLOOD PRESSURE: 90 MMHG | HEART RATE: 70 BPM | BODY MASS INDEX: 25.05 KG/M2 | OXYGEN SATURATION: 94 % | TEMPERATURE: 96.9 F | HEIGHT: 70 IN

## 2020-01-24 DIAGNOSIS — J90 PLEURAL EFFUSION: ICD-10-CM

## 2020-01-24 PROCEDURE — 71046 X-RAY EXAM CHEST 2 VIEWS: CPT

## 2020-01-24 PROCEDURE — 99283 EMERGENCY DEPT VISIT LOW MDM: CPT | Mod: 25

## 2020-01-24 ASSESSMENT — MIFFLIN-ST. JEOR: SCORE: 1510.04

## 2020-01-24 ASSESSMENT — ENCOUNTER SYMPTOMS
SHORTNESS OF BREATH: 1
TREMORS: 1
CONSTIPATION: 1

## 2020-01-24 NOTE — ED NOTES
Bed: ED01  Expected date: 1/24/20  Expected time: 5:54 PM  Means of arrival: Ambulance  Comments:  429 48f from clinic, [facility] ETA 1800

## 2020-01-24 NOTE — ED AVS SNAPSHOT
Emergency Department  [address] [place] [address]  Phone:  [phone]  Fax:  [phone]                                    Tc Garcia   MRN: [id number]    Department:   Emergency Department   Date of Visit:  1/24/2020           After Visit Summary Signature Page    I have received my discharge instructions, and my questions have been answered. I have discussed any challenges I see with this plan with the nurse or doctor.    ..........................................................................................................................................  Patient/Patient Representative Signature      ..........................................................................................................................................  Patient Representative Print Name and Relationship to Patient    ..................................................               ................................................  Date                                   Time    ..........................................................................................................................................  Reviewed by Signature/Title    ...................................................              ..............................................  Date                                               Time          22EPIC Rev 08/18

## 2020-01-25 NOTE — DISCHARGE INSTRUCTIONS
Please come back to the emergency room immediately if you are unable to do your activities of daily living, or if you want to have a thoracentesis sooner.  Please call the above number around 8 AM on Monday morning, January 27.  You may be transferred to an ordering physician, but please do follow the prompts, and seek a same-day appointment for another thoracentesis.

## 2020-01-25 NOTE — ED PROVIDER NOTES
History     Chief Complaint:  Shortness of Breath     The history is provided by the patient and the spouse.     Tc Garcia is an anticoagulated 79 year old male with a history of pleural effusion and diabetes who presents with his wife for evaluation shortness of breath and tremors. The patient states that he had an x-ray completed this morning for a routine check up where they found that there was significant fluid in his lungs. He states that three weeks ago he had a similar incident where 2.2 liters of fluid were removed, and there was no cancer or infection found. The patient's wife states that this has never happened before the patient fell down the stairs in October. The patient states that he has had no pain, but has been constipated, and notes that his shortness of breath worsens with exertion. He presents today concerned that he may need to have the fluid drained.    Allergies:  No Known Drug Allergies     Medications:    Eliquis  Codarone  Catapres  Lasix  Lovastatin  Metformin  Actos  Verelan  Micro-K    Past Medical History:    Diabetes  Hyperlipidemia  Rosacea  Hypertension    Past Surgical History:    Tonsillectomy    Family History:    Diabetes  Heart disease    Social History:  The patient presents to the ED with his wife.  Smoking Status: Former Smoker  Smokeless Tobacco: Never Used  Alcohol Use: Not Currently  Drug Use: Not Currently  PCP: Senthil Moya     Review of Systems   Respiratory: Positive for shortness of breath.    Gastrointestinal: Positive for constipation.   Neurological: Positive for tremors.   All other systems reviewed and are negative.      Physical Exam     Patient Vitals for the past 24 hrs:   BP Temp Temp src Pulse Heart Rate Resp SpO2 Height Weight   01/24/20 1845 (!) 176/90 -- -- 70 -- -- 94 % -- --   01/24/20 1830 (!) 183/88 -- -- 69 -- -- 97 % -- --   01/24/20 1815 (!) 180/101 -- -- 74 -- -- 98 % -- --   01/24/20 1805 -- -- -- -- -- -- 96 % -- --   01/24/20 1745 (!)  "220/101 96.9  F (36.1  C) Temporal -- 83 22 96 % 1.778 m (5' 10\") 79.4 kg (175 lb)       Physical Exam  Vitals: reviewed by me  General: [place] seen on [place], pleasant, cooperative, and alert to conversation  Eyes: Tracking well, clear conjunctiva BL  ENT: MMM, midline trachea.   Lungs:   No tachypnea, no accessory muscle use. No respiratory distress.   CV: Rate as above, regular rhythm.    MSK: no peripheral edema or joint effusion.  No evidence of trauma  Skin: No rash, normal turgor and temperature  Neuro: Clear speech and no facial droop.  Psych: Not RIS, no e/o AH/VH      Emergency Department Course     Imaging:  Radiology findings were communicated with the patient and family who voiced understanding of the findings.    XR Chest 2 views:   2 views of the chest are performed. There has been interval increase in the size of the left pleural effusion compared to prior exam. This is now moderate to large in size. Right lung remains clear with what may be trace pleural fluid. No evidence of pneumothorax. Heart size is difficult to assess due to the left effusion.  As per radiology.    Emergency Department Course:  Past medical records, nursing notes, and vitals reviewed.    1815 I performed an exam of the patient as documented above.     The patient was sent for a chest x-ray while in the emergency department, results above.     1924 I rechecked the patient and discussed the results of his workup thus far.     1935 I consulted with Dr. Lovett, Interventional Radiologist, regarding the patient's history and presentation here in the emergency department who agreed to see the patient Monday and have the fluid drained.    1939 I rechecked the patient and discussed with him that he can be seen on Monday by Dr. Lovett for this and again offered hospital admission.    Findings and plan explained to the Patient. Patient discharged home with instructions regarding supportive care, medications, and reasons to " return. The importance of close follow-up was reviewed.    I personally reviewed the imaging results with the patient and spouse and answered all related questions prior to discharge.     Impression & Plan     Medical Decision Making:  Tc Garcia is a very pleasant 79 year old male who presents to the emergency room with what appears to be worsening pleural effusion. This was found at a routine follow-up, he has no symptoms. He states that he would much prefer to go home and schedule an appointment with interventional radiology on Monday, as opposed to being admitted here for observation and having the fluid drained tomorrow. He states that he went shopping all day, was carrying groceries, and had almost no limitations. Again, he seems to be medically literate, understanding that he needs to come back to the ER with any worsening symptoms given the expansion rate of his pleural effusion, and he feels comfortable returning should the need arise. I spoke to Dr. Lovett of the Interventional Radiology team that will likely be draining the pleural effusion, and we arranged for this to happen on Monday. Patient is okay with this plan, will discharge as above.     Diagnosis:    ICD-10-CM    1. Pleural effusion J90        Disposition:  Discharged to home.    Scribe Disclosure:  I, Mario Jeremy, am serving as a scribe at 6:15 PM on 1/24/2020 to document services personally performed by Charlie Arguello MD based on my observations and the provider's statements to me.      Charlie Arguello MD  01/24/20 1230

## 2020-01-27 ENCOUNTER — TELEPHONE (OUTPATIENT)
Dept: CARDIOLOGY | Facility: CLINIC | Age: 80
End: 2020-01-27

## 2020-01-27 ENCOUNTER — HOSPITAL ENCOUNTER (OUTPATIENT)
Facility: CLINIC | Age: 80
Discharge: HOME | End: 2020-01-27
Payer: COMMERCIAL

## 2020-01-27 ENCOUNTER — TELEPHONE (OUTPATIENT)
Dept: MEDSURG UNIT | Facility: CLINIC | Age: 80
End: 2020-01-27

## 2020-01-27 VITALS
RESPIRATION RATE: 18 BRPM | TEMPERATURE: 95.8 F | SYSTOLIC BLOOD PRESSURE: 151 MMHG | DIASTOLIC BLOOD PRESSURE: 81 MMHG | OXYGEN SATURATION: 97 %

## 2020-01-27 PROCEDURE — 40000863 ZZH STATISTIC RADIOLOGY XRAY, US, CT, MAR, NM

## 2020-01-27 RX ORDER — DEXTROSE MONOHYDRATE 25 G/50ML
25-50 INJECTION, SOLUTION INTRAVENOUS
Status: DISCONTINUED | OUTPATIENT
Start: 2020-01-27 | End: 2020-01-27 | Stop reason: HOSPADM

## 2020-01-27 RX ORDER — NICOTINE POLACRILEX 4 MG
15-30 LOZENGE BUCCAL
Status: DISCONTINUED | OUTPATIENT
Start: 2020-01-27 | End: 2020-01-27 | Stop reason: HOSPADM

## 2020-01-27 NOTE — PLAN OF CARE
Return call from Penny [facility] Afib RN. She stated that per Dr. Camejo, thoracentesis needs to be done prior to cardioversion and heart clinic will follow up with pt and spouse to reschedule cardioversion once thoracentesis has been done or scheduled. Relayed information to Emma, unit JR, and Astrid JOSUE RN, who will follow up with pt's PCP and radiology to ensure communication occurs to reschedule these procedures for pt.

## 2020-01-27 NOTE — PLAN OF CARE
Per Dr. Head, unable to perform thoracentesis today as pt has been on Eliquis and took this medication today. Plan for thoracentesis post-cardioversion once Eliquis has been stopped.

## 2020-01-27 NOTE — PROGRESS NOTES
After speaking to Dr. Head, thoracentesis was cancelled for today, because of Eliquis. Pt appears to understand and will follow up with Dr. Camejo about holding Eliquis for thoracentesis, after cardioversion is done in 1 week. Pt does not appear SOB. Pt discharged to home with wife, per ambulatory per pt request.

## 2020-01-27 NOTE — PROGRESS NOTES
Care Suites Admission Nursing Note    Patient Information  Name: Tc Garcia  Age: 79 year old  Reason for admission: thoracentesis  Care Suites arrival time: 1220    Patient Admission/Assessment   Pre-procedure assessment complete: YES  If abnormal assessment/labs, provider notified: Yes  NPO: YES  Medications held per instructions/orders:  NO, pt takes Eliquis twice a day and took this morning and states can not miss any doses because he is scheduled for cardioversion on Feb. 3, 2020.  Consent: deferred  Patient oriented to room: YES  Education/questions answered: YES  Plan/other: Dr. Head was paged to inform her about pt not holding Eliquis and why.       Discharge Planning  Accompanied by: wife Radha here with pt  Discharge name/phone number: Radha will wait in pt's Care Suite room #8  Overnight post sedation caregiver: no sedation planned  Discharge location: home    Sanna Mcduffie RN

## 2020-01-27 NOTE — PROGRESS NOTES
1/27/2020 Post Discharge Note:    1415 Call received from [facility] - Dr Camejo wants the Thoracentesis done before the Cardioversion on 2/3.  1450 Spoke with Penny - Dr Camejo's nurse - notified that Dr Head will need to speak with Dr Camejo directly.  1505 Dr Head updated on situation. Will call Dr Camejo & let nurse know of decision made.  1525 Call received from Dr Head. Okay received per Dr Camejo to hold Eliquis per protocol and to also keep Cardioversion scheduled on 2/3.  1530 Judy Weiss CNP paged for orders.  1605 ORI Kim informed of situation. Will placed orders once procedure scheduled.  1610 Pt called to verify that he has not taken Eliquis today.  1615 Radiology scheduling called. Procedure scheduled for Tuesday @ 1400.  1620 Pt called. Informed that procedure is scheduled for Tuesday @ 1400. He is to check in at [place] desk by 1330 as no labs needed. Pt instructed to hold Eliquis tonight and in the morning. Instructed that he may resume Eliquis tomorrow evening after the Thoracentesis. Pt also informed that he may continue with Cardioversion on Monday 2/3 per Dr Camejo and that he should not miss any doses of Eliquis from Tuesday evening thru Monday morning. Verbal understanding received from pt.  1630 Orders placed per Judy Weiss CNP.

## 2020-01-27 NOTE — TELEPHONE ENCOUNTER
01/27/20 Recd call from [facility]. Pt is here today to undergo thoracentesis for pleural effusion. Per Dr Head- radiology, thoracentesis cannot be done because pt is taking Eliquis 5 mg BID as he is scheduled to undergo DCCV on 2/4. Dr Head would like to discuss holding Eliquis for thoracentesis w Dr Camejo. Radiology requests page from Dr Camejo ([phone]). Will send urgent msg to Dr Camejo. Mary 145 pm

## 2020-01-27 NOTE — PLAN OF CARE
Call to Marisela Francis RN at [facility] clinic re: holding Eliquis after Cardioversion for thoracentesis next Monday. Penny will relay information to Dr. Camejo and follow up with pt as well as Dr. Head.

## 2020-01-28 ENCOUNTER — HOSPITAL ENCOUNTER (OUTPATIENT)
Facility: CLINIC | Age: 80
Discharge: HOME | End: 2020-01-28
Admitting: NURSE PRACTITIONER
Payer: COMMERCIAL

## 2020-01-28 ENCOUNTER — HOSPITAL ENCOUNTER (OUTPATIENT)
Dept: ULTRASOUND IMAGING | Facility: CLINIC | Age: 80
End: 2020-01-28
Attending: RADIOLOGY
Payer: COMMERCIAL

## 2020-01-28 VITALS
DIASTOLIC BLOOD PRESSURE: 77 MMHG | RESPIRATION RATE: 18 BRPM | SYSTOLIC BLOOD PRESSURE: 157 MMHG | OXYGEN SATURATION: 97 % | HEART RATE: 67 BPM | TEMPERATURE: 96.9 F

## 2020-01-28 PROCEDURE — 40000863 ZZH STATISTIC RADIOLOGY XRAY, US, CT, MAR, NM

## 2020-01-28 PROCEDURE — 25000125 ZZHC RX 250: Performed by: RADIOLOGY

## 2020-01-28 PROCEDURE — 32555 ASPIRATE PLEURA W/ IMAGING: CPT

## 2020-01-28 RX ORDER — LIDOCAINE HYDROCHLORIDE 10 MG/ML
10 INJECTION, SOLUTION EPIDURAL; INFILTRATION; INTRACAUDAL; PERINEURAL ONCE
Status: COMPLETED | OUTPATIENT
Start: 2020-01-28 | End: 2020-01-28

## 2020-01-28 RX ADMIN — LIDOCAINE HYDROCHLORIDE 10 ML: 10 INJECTION, SOLUTION EPIDURAL; INFILTRATION; INTRACAUDAL; PERINEURAL at 14:03

## 2020-01-28 NOTE — DISCHARGE INSTRUCTIONS
Thoracentesis Discharge Instructions     After you go home:      You may resume your normal diet    Care of Puncture Site:      For the first 48 hrs, check your puncture site every couple hours while you are awake     If there is a bandaid - you may remove it tomorrow morning    You may shower tomorrow    No tub baths, whirlpools or swimming until your puncture site has fully healed     Activity:      You may go back to normal activity in 24 hours    Wait 48 hours before lifting, straining, exercise or other strenuous activity    Medicines:      You may resume all your medications    For minor pain, you may take Acetaminophen (Tylenol) or Ibuprofen (Advil)            Call the provider who ordered this procedure if:      The site is red, swollen, hot or tender    Blood or fluid is draining from the site    You have chills or a fever greater than 101 F (38 C)    Shortness of breath    Pain that is getting worse    Any questions or concerns      Additional Information:      During this test, a needle will sometimes enter the lung. This can cause the lung to collapse - called a pneumothorax. Symptoms include severe chest pain, breathing problems or increased blood in your sputum (phlegm).     Call  911 or go to the Emergency Room if:      Severe chest pain or trouble breathing    Increased blood in your sputum (phlegm)    Bleeding that you cannot control      If you have questions call:        [facility] Radiology Dept @ [phone]      The provider who performed your procedure was _________________.

## 2020-01-28 NOTE — PROGRESS NOTES
Care Suites Post Procedure Summary (without sedation)     Immediately prior to starting the procedure a Time Out was conducted with procedural staff and re-confirmed the patient s name, procedure, and site/side.      Consent obtained from patient after discussing the risks, benefits and alternatives.      Procedure: thoracentesis    Procedure Interventions:    Fluid (cc) removed: Yes. Removed 2200 ml of yellow fluid from left lung today in ultrasound.    Tube/Drain placed: NA.    Patient tolerance: Patient tolerated the procedure well with no immediate complications.  Site dressed by tech with band aid. No bleeding or drainage at completion. Pt denies pain.    Post-procedure report given to: RN and pt transferred to  by cart.    (See Doc Flow-sheets and MAR for additional information)

## 2020-01-28 NOTE — PROGRESS NOTES
Care Suites Discharge Summary    Discharge Criteria:   Discharge Criteria met per MD orders: Yes.   Vital signs stable.     Pt demonstrates ability to ambulate safely: Yes.  (See discharge questionnaire for additional information)    Discharge instructions & education:   Discharge instructions reviewed with patient and spouse. Patient verbalizes understanding.   Additional patient education provided:  Yes thoracentesis    Medications:   Patient will be discharging on new medications- No. Patient verbalizes reason for use, start date, and side effects NA.    Items returned to patient:   Home and hospital acquired medications returned to patient NA   Listed belongings gathered and returned to patient: Yes    Patient discharged to home with spouse.     Héctor Cross, AUGUSTIN

## 2020-01-29 ENCOUNTER — TRANSFERRED RECORDS (OUTPATIENT)
Dept: HEALTH INFORMATION MANAGEMENT | Facility: CLINIC | Age: 80
End: 2020-01-29

## 2020-01-31 ENCOUNTER — TELEPHONE (OUTPATIENT)
Dept: CARDIOLOGY | Facility: CLINIC | Age: 80
End: 2020-01-31

## 2020-01-31 NOTE — TELEPHONE ENCOUNTER
"01/31/20 Msg received from Dr Camejo  \"To reduce a small risk of sinus bradycardia after cardioversion make sure he holds verapamil a day before cardioversion. Thanks qp \"  Pt called and instructed to hold verapamil Sunday 2/2 evening dose. Pt voiced understanding and agreement w yudi Hernandez 1155 am  "

## 2020-02-03 ENCOUNTER — ANESTHESIA (OUTPATIENT)
Dept: SURGERY | Facility: CLINIC | Age: 80
End: 2020-02-03

## 2020-02-03 ENCOUNTER — ANESTHESIA EVENT (OUTPATIENT)
Dept: MEDSURG UNIT | Facility: CLINIC | Age: 80
End: 2020-02-03

## 2020-02-03 ENCOUNTER — HOSPITAL ENCOUNTER (OUTPATIENT)
Dept: MEDSURG UNIT | Facility: CLINIC | Age: 80
End: 2020-02-03
Attending: INTERNAL MEDICINE
Payer: COMMERCIAL

## 2020-02-03 ENCOUNTER — ANESTHESIA EVENT (OUTPATIENT)
Dept: SURGERY | Facility: CLINIC | Age: 80
End: 2020-02-03

## 2020-02-03 ENCOUNTER — HOSPITAL ENCOUNTER (OUTPATIENT)
Facility: CLINIC | Age: 80
Discharge: HOME | End: 2020-02-03
Admitting: RADIOLOGY
Payer: COMMERCIAL

## 2020-02-03 ENCOUNTER — ANESTHESIA (OUTPATIENT)
Dept: MEDSURG UNIT | Facility: CLINIC | Age: 80
End: 2020-02-03

## 2020-02-03 VITALS — RESPIRATION RATE: 16 BRPM | HEART RATE: 82 BPM | SYSTOLIC BLOOD PRESSURE: 174 MMHG | DIASTOLIC BLOOD PRESSURE: 98 MMHG

## 2020-02-03 DIAGNOSIS — I48.91 ATRIAL FIBRILLATION, UNSPECIFIED TYPE (H): ICD-10-CM

## 2020-02-03 PROCEDURE — 93010 ELECTROCARDIOGRAM REPORT: CPT | Performed by: INTERNAL MEDICINE

## 2020-02-03 PROCEDURE — 40000235 ZZH STATISTIC TELEMETRY

## 2020-02-03 PROCEDURE — 92960 CARDIOVERSION ELECTRIC EXT: CPT

## 2020-02-03 PROCEDURE — 93005 ELECTROCARDIOGRAM TRACING: CPT

## 2020-02-03 PROCEDURE — 40000065 ZZH STATISTIC EKG NON-CHARGEABLE

## 2020-02-03 RX ORDER — FLUMAZENIL 0.1 MG/ML
0.2 INJECTION, SOLUTION INTRAVENOUS
Status: DISCONTINUED | OUTPATIENT
Start: 2020-02-03 | End: 2020-02-03 | Stop reason: HOSPADM

## 2020-02-03 RX ORDER — ATROPINE SULFATE 0.1 MG/ML
.5-1 INJECTION INTRAVENOUS
Status: DISCONTINUED | OUTPATIENT
Start: 2020-02-03 | End: 2020-02-03 | Stop reason: HOSPADM

## 2020-02-03 RX ORDER — POTASSIUM CHLORIDE 1500 MG/1
20 TABLET, EXTENDED RELEASE ORAL
Status: DISCONTINUED | OUTPATIENT
Start: 2020-02-03 | End: 2020-02-03 | Stop reason: HOSPADM

## 2020-02-03 RX ORDER — NALOXONE HYDROCHLORIDE 0.4 MG/ML
.1-.4 INJECTION, SOLUTION INTRAMUSCULAR; INTRAVENOUS; SUBCUTANEOUS
Status: DISCONTINUED | OUTPATIENT
Start: 2020-02-03 | End: 2020-02-03 | Stop reason: HOSPADM

## 2020-02-03 RX ORDER — MAGNESIUM SULFATE HEPTAHYDRATE 40 MG/ML
2 INJECTION, SOLUTION INTRAVENOUS
Status: DISCONTINUED | OUTPATIENT
Start: 2020-02-03 | End: 2020-02-03 | Stop reason: HOSPADM

## 2020-02-03 RX ORDER — POTASSIUM CHLORIDE 1500 MG/1
40 TABLET, EXTENDED RELEASE ORAL
Status: DISCONTINUED | OUTPATIENT
Start: 2020-02-03 | End: 2020-02-03 | Stop reason: HOSPADM

## 2020-02-03 RX ORDER — SODIUM CHLORIDE 9 MG/ML
INJECTION, SOLUTION INTRAVENOUS CONTINUOUS
Status: DISCONTINUED | OUTPATIENT
Start: 2020-02-03 | End: 2020-02-03 | Stop reason: HOSPADM

## 2020-02-03 NOTE — PROGRESS NOTES
Patient in normal sinus rhythm.  Dr. Killian notified and confirmed that the patient is in a NSR.  Patient discharged to home with wife.  Patient will follow up with Dr. Camejo.

## 2020-02-05 LAB — INTERPRETATION ECG - MUSE: NORMAL

## 2020-02-06 ENCOUNTER — TELEPHONE (OUTPATIENT)
Dept: CARDIOLOGY | Facility: CLINIC | Age: 80
End: 2020-02-06

## 2020-02-06 NOTE — TELEPHONE ENCOUNTER
Spoke to patient and instructed him to follow up with already scheduled appt on 2/25 with TAMAR.  Patient agreed with plan.  AUGUSTIN Hickman

## 2020-02-06 NOTE — TELEPHONE ENCOUNTER
----- Message from Quinton Paredes MD sent at 2/6/2020 11:03 AM CST -----  Regarding: RE: follow up needed?  Please have pt see EP steven in a month. Thanks, qp  ----- Message -----  From: Penny Pedroza RN  Sent: 2/4/2020   7:33 AM CST  To: Quinton Paredes MD  Subject: follow up needed?                                Dr Camejo,  This pt presented to his scheduled DCCV in [facility] yesterday.  When would you like him to follow up with you?  Thanks  Penny

## 2020-02-24 ENCOUNTER — TELEPHONE (OUTPATIENT)
Dept: MEDSURG UNIT | Facility: CLINIC | Age: 80
End: 2020-02-24

## 2020-02-24 RX ORDER — DEXTROSE MONOHYDRATE 25 G/50ML
25-50 INJECTION, SOLUTION INTRAVENOUS
Status: CANCELLED | OUTPATIENT
Start: 2020-02-24

## 2020-02-24 RX ORDER — NICOTINE POLACRILEX 4 MG
15-30 LOZENGE BUCCAL
Status: CANCELLED | OUTPATIENT
Start: 2020-02-24

## 2020-02-24 NOTE — PROGRESS NOTES
HPI:  Tc Garcia is a 80 year old male who presents for atrial fibrillation follow-up.  He is a patient of Dr. Camejo and Dr. Julien and his past medical history includes    1. Paroxysmal atrial fibrillation  2. Hypertension  3. Mechanical fall.  (10/2019) suffering C3 and T4 to fracture    In October 2019, patient was hospitalized after under going a fall with multiple injuries.  He was noted to have severe sinus bradycardia at 6 AM in the morning while sleeping.  At that time he was taking verapamil 240 mg and he was placed on metoprolol and continue to rate control method.  A subsequent ZIO Patch revealed 91% A. fib burden with a average heart rate of 56 bpm and variation between 30/115 bpm.  Due to the shortness of breath, he then underwent a thoracentesis removing 2 L of fluid     In January 2020, he saw Dr. Camejo and his increased shortness of breath with exertion appears to correlate with his A. fib and recurrent left-sided pleural effusion.  A rhythm control method was taken using amiodarone 200 mg twice daily for 30 days then 200 mg daily and continue verapamil 240 mg daily.  He was then scheduled for a cardioversion however presented in sinus rhythm.  Dr. Camejo did discuss patient's sinus node disease and possibly needing a ppm in the future .      Today he presents unaware if he is in atrial fibrillation.  He does not take his blood pressure at home and he does not feel palpitations, chest discomfort.  he has shortness of breath associated with his left pleural effusion which he will undergo a thoracentesis tomorrow.  He is taking his medications as prescribed including his amiodarone and Eliquis and denies bleeding, hematuria, hematochezia, epistaxis and  signs/symptoms of stroke.  At this time he  denies chest pain or pressure, dizziness, syncope, angina, orthopnea, PND, or edema.      ASSESSMENT AND PLAN    Asymptomatic paroxysmal atrial fibrillation    In October 2019 patient had a fall and was found to  have severe bradycardia while in atrial fibrillation.  A subsequent ZIO Patch revealed 91% A. fib burden.  Patient was complaining of shortness of breath and found to have a pleural effusion and subsequently underwent a thoracentesis removing 2 L of fluid.  In January 2020, patient saw Dr. Camejo with complaints of increased shortness of breath with exertion which appeared to correlate with his A. fib and recurrent pleural effusion.  Patient was subsequently started on amiodarone for rhythm control and was to undergo a cardioversion however presented in normal rhythm.    For rhythm control he is currently taking amiodarone 200 mg daily and verapamil 240 mg daily.    For anticoagulation for CHADS VASC 4 (age++, DM, HTN) he is taking Eliquis 5 mg twice daily.  He will hold tonight for thoracentesis in the morning.    For ongoing surveillance he will purchase a pulse oximetry with a waveform on it and monitor his rhythm 2-3 times a day.  He will call if he remains in atrial fibrillation for 1 to 2 days.    Follow-up with Dr. Camejo in 6 months with amiodarone labs and PFTs prior.    Amiodarone therapy    TSH= 2.32 (1/7/2020)    ALT/AST= 5/16 (1/7/2020)    Repeat amiodarone labs and PFT in 6 months    Pleural effusion    Patient continues to complain of shortness of breath with exertion  and is subsequently undergoing a thoracentesis tomorrow and will hold his Eliquis this evening.    He is currently taking torsemide 20 mg daily however is not on potassium replacement.  He was discharged from rehab with only a 15-day supply and then stopped taking it.  Will check BMP today and may need to replace potassium    Hypertension.     elevated today in clinic despite clonidine, torsemide, and verapamil and on recheck his blood pressure was normal    Patient expresses understanding and agreement with the plan.     I appreciate the chance to help with Tc Garcia Please let me know if you have any questions or concerns.    Lesly  OMAR Garcia, CNP    This note was completed in part using Dragon voice recognition software. Although reviewed after completion, some word and grammatical errors may occur.    Orders Placed This Encounter   Procedures     Basic metabolic panel     Follow-Up with Electrophysiologist     Orders Placed This Encounter   Medications     torsemide (DEMADEX) 20 MG tablet     Sig: Take 20 mg by mouth daily     Medications Discontinued During This Encounter   Medication Reason     furosemide (LASIX) 20 MG tablet Medication Reconciliation Clean Up     potassium chloride ER (MICRO-K) 10 MEQ CR capsule Medication Reconciliation Clean Up         Encounter Diagnoses   Name Primary?     Atrial fibrillation, unspecified type (H)      Pleural effusion Yes     Paroxysmal atrial fibrillation (H)      Persistent atrial fibrillation      On amiodarone therapy        CURRENT MEDICATIONS:  Current Outpatient Medications   Medication Sig Dispense Refill     amiodarone (PACERONE/CODARONE) 200 MG tablet Take 1 tablet (200 mg) by mouth 2 times daily For 30 days then 1 tablet (200 mg) daily. 90 tablet 3     apixaban ANTICOAGULANT (ELIQUIS) 5 MG tablet Take 1 tablet (5 mg) by mouth 2 times daily       cloNIDine (CATAPRES) 0.3 MG tablet Take 0.3 mg by mouth 2 times daily       glucagon 1 MG kit 1 mg as needed for low blood sugar       INSULIN PUMP - OUTPATIENT Novolog Insulin  BASAL RATES and times:  Midnight to 6am: 0.65 units/hr  6am to 10:30 pm: 0.95 units/hour    10:30pm to midnight: 0.55 units/hr  CARB RATIO: 1 unit per 15 grams  Correction Factor (Sensitivity): 55mg/dL  BLOOD GLUCOSE TARGET and times:  12   AM (midnight): 100 - 140  5:30     AM:  100 - 120  10   PM:  100 - 140  Active Insulin Time:  5 hours   Bolus-Correction 1 unit(s) to lower blood glucose by 55 mg/dL  Checks insulin about 4-5 times a day manually  (Per Rx, pt uses 50-60 units/day)       LOVASTATIN 20 MG PO TABS 1 TABLET DAILY AT DINNER 90 Tab 0     metFORMIN  (GLUCOPHAGE) 500 MG tablet Take 500 mg by mouth 2 times daily (with meals)       pioglitazone (ACTOS) 30 MG tablet Take 30 mg by mouth daily (with breakfast)        torsemide (DEMADEX) 20 MG tablet Take 20 mg by mouth daily       triamcinolone (KENALOG) 0.1 % external ointment Apply topically daily as needed for irritation       verapamil ER (VERELAN) 240 MG 24 hr capsule Take 1 capsule (240 mg) by mouth At Bedtime         ALLERGIES     Allergies   Allergen Reactions     No Known Drug Allergies        PAST MEDICAL HISTORY:  Past Medical History:   Diagnosis Date     Diabetic PROTEINURIA      Mixed hyperlipidemia      Personal history of colonic polyps 1972    gets colonoscopy every five years, due in      Rosacea      Type II or unspecified type diabetes mellitus without mention of complication, not stated as uncontrolled      Unspecified essential hypertension        PAST SURGICAL HISTORY:  Past Surgical History:   Procedure Laterality Date     ANESTHESIA CARDIOVERSION N/A 2/3/2020    Procedure: ANESTHESIA, FOR CARDIOVERSION;  Surgeon: GENERIC ANESTHESIA PROVIDER;  Location: [facility] OR     HC REMOVE TONSILS/ADENOIDS,<11 Y/O      T & A <11y.o.       FAMILY HISTORY:  Family History   Problem Relation Age of Onset     Family History Negative Mother          age 71     Family History Negative Father          age 70     Diabetes Brother         alive age 77     Diabetes Brother         alive age 76     Family History Negative Brother              Family History Negative Brother              Diabetes Sister         alive age76     Family History Negative Sister          age 86     Heart Disease Sister              Family History Negative Sister              Family History Negative Sister              Diabetes Sister      Diabetes Sister      Diabetes Brother      Diabetes Brother        SOCIAL HISTORY:  Social History     Socioeconomic  History     Marital status:      Spouse name: Lianna     Number of children: 4     Years of education: 16     Highest education level: None   Occupational History     Occupation: [employer]     Employer: [employer]    Social Needs     Financial resource strain: None     Food insecurity:     Worry: None     Inability: None     Transportation needs:     Medical: None     Non-medical: None   Tobacco Use     Smoking status: Former Smoker     Packs/day: 0.20     Years: 40.00     Pack years: 8.00     Types: Cigarettes     Last attempt to quit: 2008     Years since quittin.1     Smokeless tobacco: Never Used     Tobacco comment: occasional   Substance and Sexual Activity     Alcohol use: Not Currently     Alcohol/week: 35.0 standard drinks     Drug use: Not Currently     Sexual activity: None   Lifestyle     Physical activity:     Days per week: None     Minutes per session: None     Stress: None   Relationships     Social connections:     Talks on phone: None     Gets together: None     Attends [employer] service: None     Active member of club or organization: None     Attends meetings of clubs or organizations: None     Relationship status: None     Intimate partner violence:     Fear of current or ex partner: None     Emotionally abused: None     Physically abused: None     Forced sexual activity: None   Other Topics Concern     Parent/sibling w/ CABG, MI or angioplasty before 65F 55M? Not Asked   Social History Narrative     None       Review of Systems:  Skin:  Negative bruising   Eyes:  Positive for glasses  ENT:  Negative    Respiratory:  Positive for shortness of breath;dyspnea on exertion(sob with walking)  Cardiovascular:  Negative Positive for;exercise intolerance;fatigue  Gastroenterology: Negative    Genitourinary:  Positive for urinary frequency  Musculoskeletal:  Negative    Neurologic:  Negative    Psychiatric:  Positive for sleep disturbances  Heme/Lymph/Imm:  Negative   "  Endocrine:  Positive for diabetes    Physical Exam:  Vitals: BP (!) 164/95   Pulse 73   Ht 1.778 m (5' 10\")   Wt 80 kg (176 lb 4.8 oz)   BMI 25.30 kg/m      Constitutional:  cooperative;well developed;cooperative, alert and oriented, well developed, well nourished, in no acute distress        Skin:  warm and dry to the touch, no apparent skin lesions or masses noted        Head:  normocephalic        Eyes:  pupils equal and round        ENT:  no pallor or cyanosis        Neck:  JVP normal        Chest:      Diminished on posterior left side otherwise clear to auscultation    Cardiac: regular rhythm                  Abdomen:  abdomen soft obese      Vascular:       right radial artery;2+             left radial artery;2+                  Extremities and Back:  no edema;no deformities, clubbing, cyanosis, erythema observed        Neurological:  no gross motor deficits          Recent Lab Results:  LIPID RESULTS:  Lab Results   Component Value Date    CHOL 131 03/09/2009    HDL 48 03/09/2009    LDL 65 03/09/2009    TRIG 87 03/09/2009    CHOLHDLRATIO 2.7 03/09/2009       LIVER ENZYME RESULTS:  Lab Results   Component Value Date    AST 16 01/07/2020    ALT <5 (L) 01/07/2020       CBC RESULTS:  Lab Results   Component Value Date    WBC 13.6 (H) 12/30/2019    RBC 4.54 12/30/2019    HGB 12.5 (L) 12/30/2019    HCT 39.0 (L) 12/30/2019    MCV 86 12/30/2019    MCH 27.5 12/30/2019    MCHC 32.1 12/30/2019    RDW 15.4 (H) 12/30/2019     12/30/2019       BMP RESULTS:  Lab Results   Component Value Date     12/30/2019    POTASSIUM 3.8 12/30/2019    CHLORIDE 105 12/30/2019    CO2 26 12/30/2019    ANIONGAP 9 12/30/2019     (H) 12/30/2019    BUN 11 12/30/2019    CR 0.98 12/30/2019    GFRESTIMATED 72 12/30/2019    GFRESTBLACK 84 12/30/2019    LLOYD 9.6 12/30/2019        A1C RESULTS:  Lab Results   Component Value Date    A1C 7.7 (H) 10/31/2019       INR RESULTS:  Lab Results   Component Value Date    INR 1.65 (H) " 12/31/2019    INR 1.44 (H) 11/07/2019           CC  Quinton Paredes MD  [id number]  [place] [id number]

## 2020-02-24 NOTE — PROGRESS NOTES
Called to speak with pt regarding Hold for Eliquis 24 hours pre Thoracentesis. Pt will check with his ordering provider.

## 2020-02-25 ENCOUNTER — OFFICE VISIT (OUTPATIENT)
Dept: CARDIOLOGY | Facility: CLINIC | Age: 80
End: 2020-02-25
Attending: INTERNAL MEDICINE
Payer: COMMERCIAL

## 2020-02-25 VITALS
SYSTOLIC BLOOD PRESSURE: 124 MMHG | WEIGHT: 176.3 LBS | DIASTOLIC BLOOD PRESSURE: 73 MMHG | HEIGHT: 70 IN | BODY MASS INDEX: 25.24 KG/M2 | HEART RATE: 73 BPM

## 2020-02-25 DIAGNOSIS — I48.91 ATRIAL FIBRILLATION, UNSPECIFIED TYPE (H): ICD-10-CM

## 2020-02-25 DIAGNOSIS — J90 PLEURAL EFFUSION: Primary | ICD-10-CM

## 2020-02-25 DIAGNOSIS — I48.0 PAROXYSMAL ATRIAL FIBRILLATION (H): ICD-10-CM

## 2020-02-25 DIAGNOSIS — I48.19 PERSISTENT ATRIAL FIBRILLATION (H): ICD-10-CM

## 2020-02-25 DIAGNOSIS — Z79.899 ON AMIODARONE THERAPY: ICD-10-CM

## 2020-02-25 DIAGNOSIS — J90 PLEURAL EFFUSION: ICD-10-CM

## 2020-02-25 LAB
ANION GAP SERPL CALCULATED.3IONS-SCNC: 11.6 MMOL/L (ref 6–17)
BUN SERPL-MCNC: 15 MG/DL (ref 7–30)
CALCIUM SERPL-MCNC: 9.3 MG/DL (ref 8.5–10.5)
CHLORIDE SERPL-SCNC: 96 MMOL/L (ref 98–107)
CO2 SERPL-SCNC: 32 MMOL/L (ref 23–29)
CREAT SERPL-MCNC: 1.47 MG/DL (ref 0.7–1.3)
GFR SERPL CREATININE-BSD FRML MDRD: 46 ML/MIN/{1.73_M2}
GLUCOSE SERPL-MCNC: 293 MG/DL (ref 70–105)
POTASSIUM SERPL-SCNC: 3.6 MMOL/L (ref 3.5–5.1)
SODIUM SERPL-SCNC: 136 MMOL/L (ref 136–145)

## 2020-02-25 PROCEDURE — 80048 BASIC METABOLIC PNL TOTAL CA: CPT | Performed by: NURSE PRACTITIONER

## 2020-02-25 PROCEDURE — 36415 COLL VENOUS BLD VENIPUNCTURE: CPT | Performed by: NURSE PRACTITIONER

## 2020-02-25 PROCEDURE — 99214 OFFICE O/P EST MOD 30 MIN: CPT | Performed by: NURSE PRACTITIONER

## 2020-02-25 RX ORDER — TORSEMIDE 20 MG/1
40 TABLET ORAL DAILY
Status: ON HOLD | COMMUNITY
End: 2020-04-25

## 2020-02-25 ASSESSMENT — MIFFLIN-ST. JEOR: SCORE: 1510.94

## 2020-02-25 NOTE — PATIENT INSTRUCTIONS
Please purchase a pulse oximeter for surveillence for your atrial fibrillation.  I would recommend purchasing one with a wave form.  It will tell you your oxygen saturation and heart rate.   Please record your heart rate and if you are in atrial fibrillation or not.  If you remain in atrial fibrillation for 1- 2 days call us.   The wave forms for Atrial fibrillation will look irregularly irregular and normal sinus will be regular.    If you have problems or questions please call the RNs (Penny Baron, & Bere) at [phone]

## 2020-02-25 NOTE — LETTER
2/25/2020    Senthil Moya MD  [address] [facility] [address]  [facility] [address]    RE: Tc Garcia       Dear Colleague,    I had the pleasure of seeing Tc Garcia in the [facility].    HPI:  Tc Garcia is a 80 year old male who presents for atrial fibrillation follow-up.  He is a patient of Dr. Camejo and Dr. Julien and his past medical history includes    1. Paroxysmal atrial fibrillation  2. Hypertension  3. Mechanical fall.  (10/2019) suffering C3 and T4 to fracture    In October 2019, patient was hospitalized after under going a fall with multiple injuries.  He was noted to have severe sinus bradycardia at 6 AM in the morning while sleeping.  At that time he was taking verapamil 240 mg and he was placed on metoprolol and continue to rate control method.  A subsequent ZIO Patch revealed 91% A. fib burden with a average heart rate of 56 bpm and variation between 30/115 bpm.  Due to the shortness of breath, he then underwent a thoracentesis removing 2 L of fluid     In January 2020, he saw Dr. Camejo and his increased shortness of breath with exertion appears to correlate with his A. fib and recurrent left-sided pleural effusion.  A rhythm control method was taken using amiodarone 200 mg twice daily for 30 days then 200 mg daily and continue verapamil 240 mg daily.  He was then scheduled for a cardioversion however presented in sinus rhythm.  Dr. Camejo did discuss patient's sinus node disease and possibly needing a ppm in the future .      Today he presents unaware if he is in atrial fibrillation.  He does not take his blood pressure at home and he does not feel palpitations, chest discomfort.  he has shortness of breath associated with his left pleural effusion which he will undergo a thoracentesis tomorrow.  He is taking his medications as prescribed including his amiodarone and Eliquis and denies bleeding, hematuria, hematochezia, epistaxis and  signs/symptoms of stroke.  At this  time he  denies chest pain or pressure, dizziness, syncope, angina, orthopnea, PND, or edema.      ASSESSMENT AND PLAN    Asymptomatic paroxysmal atrial fibrillation    In October 2019 patient had a fall and was found to have severe bradycardia while in atrial fibrillation.  A subsequent ZIO Patch revealed 91% A. fib burden.  Patient was complaining of shortness of breath and found to have a pleural effusion and subsequently underwent a thoracentesis removing 2 L of fluid.  In January 2020, patient saw Dr. Camejo with complaints of increased shortness of breath with exertion which appeared to correlate with his A. fib and recurrent pleural effusion.  Patient was subsequently started on amiodarone for rhythm control and was to undergo a cardioversion however presented in normal rhythm.    For rhythm control he is currently taking amiodarone 200 mg daily and verapamil 240 mg daily.    For anticoagulation for CHADS VASC 4 (age++, DM, HTN) he is taking Eliquis 5 mg twice daily.  He will hold tonight for thoracentesis in the morning.    For ongoing surveillance he will purchase a pulse oximetry with a waveform on it and monitor his rhythm 2-3 times a day.  He will call if he remains in atrial fibrillation for 1 to 2 days.    Follow-up with Dr. Camejo in 6 months with amiodarone labs and PFTs prior.    Amiodarone therapy    TSH= 2.32 (1/7/2020)    ALT/AST= 5/16 (1/7/2020)    Repeat amiodarone labs and PFT in 6 months    Pleural effusion    Patient continues to complain of shortness of breath with exertion  and is subsequently undergoing a thoracentesis tomorrow and will hold his Eliquis this evening.    He is currently taking torsemide 20 mg daily however is not on potassium replacement.  He was discharged from rehab with only a 15-day supply and then stopped taking it.  Will check BMP today and may need to replace potassium    Hypertension.     elevated today in clinic despite clonidine, torsemide, and verapamil and on  recheck his blood pressure was normal    Patient expresses understanding and agreement with the plan.     I appreciate the chance to help with Tc Garcia Please let me know if you have any questions or concerns.    OMAR Hayes, CNP    This note was completed in part using Dragon voice recognition software. Although reviewed after completion, some word and grammatical errors may occur.    Orders Placed This Encounter   Procedures     Basic metabolic panel     Follow-Up with Electrophysiologist     Orders Placed This Encounter   Medications     torsemide (DEMADEX) 20 MG tablet     Sig: Take 20 mg by mouth daily     Medications Discontinued During This Encounter   Medication Reason     furosemide (LASIX) 20 MG tablet Medication Reconciliation Clean Up     potassium chloride ER (MICRO-K) 10 MEQ CR capsule Medication Reconciliation Clean Up         Encounter Diagnoses   Name Primary?     Atrial fibrillation, unspecified type (H)      Pleural effusion Yes     Paroxysmal atrial fibrillation (H)      Persistent atrial fibrillation      On amiodarone therapy        CURRENT MEDICATIONS:  Current Outpatient Medications   Medication Sig Dispense Refill     amiodarone (PACERONE/CODARONE) 200 MG tablet Take 1 tablet (200 mg) by mouth 2 times daily For 30 days then 1 tablet (200 mg) daily. 90 tablet 3     apixaban ANTICOAGULANT (ELIQUIS) 5 MG tablet Take 1 tablet (5 mg) by mouth 2 times daily       cloNIDine (CATAPRES) 0.3 MG tablet Take 0.3 mg by mouth 2 times daily       glucagon 1 MG kit 1 mg as needed for low blood sugar       INSULIN PUMP - OUTPATIENT Novolog Insulin  BASAL RATES and times:  Midnight to 6am: 0.65 units/hr  6am to 10:30 pm: 0.95 units/hour    10:30pm to midnight: 0.55 units/hr  CARB RATIO: 1 unit per 15 grams  Correction Factor (Sensitivity): 55mg/dL  BLOOD GLUCOSE TARGET and times:  12   AM (midnight): 100 - 140  5:30     AM:  100 - 120  10   PM:  100 - 140  Active Insulin Time:  5 hours    Bolus-Correction 1 unit(s) to lower blood glucose by 55 mg/dL  Checks insulin about 4-5 times a day manually  (Per Rx, pt uses 50-60 units/day)       LOVASTATIN 20 MG PO TABS 1 TABLET DAILY AT DINNER 90 Tab 0     metFORMIN (GLUCOPHAGE) 500 MG tablet Take 500 mg by mouth 2 times daily (with meals)       pioglitazone (ACTOS) 30 MG tablet Take 30 mg by mouth daily (with breakfast)        torsemide (DEMADEX) 20 MG tablet Take 20 mg by mouth daily       triamcinolone (KENALOG) 0.1 % external ointment Apply topically daily as needed for irritation       verapamil ER (VERELAN) 240 MG 24 hr capsule Take 1 capsule (240 mg) by mouth At Bedtime         ALLERGIES     Allergies   Allergen Reactions     No Known Drug Allergies        PAST MEDICAL HISTORY:  Past Medical History:   Diagnosis Date     Diabetic PROTEINURIA      Mixed hyperlipidemia      Personal history of colonic polyps 1972    gets colonoscopy every five years, due in      Rosacea      Type II or unspecified type diabetes mellitus without mention of complication, not stated as uncontrolled      Unspecified essential hypertension        PAST SURGICAL HISTORY:  Past Surgical History:   Procedure Laterality Date     ANESTHESIA CARDIOVERSION N/A 2/3/2020    Procedure: ANESTHESIA, FOR CARDIOVERSION;  Surgeon: GENERIC ANESTHESIA PROVIDER;  Location:  OR      REMOVE TONSILS/ADENOIDS,<11 Y/O      T & A <11y.o.       FAMILY HISTORY:  Family History   Problem Relation Age of Onset     Family History Negative Mother          age 71     Family History Negative Father          age 70     Diabetes Brother         alive age 77     Diabetes Brother         alive age 76     Family History Negative Brother              Family History Negative Brother              Diabetes Sister         alive age74     Family History Negative Sister          age 86     Heart Disease Sister              Family History  Negative Sister              Family History Negative Sister              Diabetes Sister      Diabetes Sister      Diabetes Brother      Diabetes Brother        SOCIAL HISTORY:  Social History     Socioeconomic History     Marital status:      Spouse name: Lianna     Number of children: 4     Years of education: 16     Highest education level: None   Occupational History     Occupation: NICORIER     Employer: [employer]    Social Needs     Financial resource strain: None     Food insecurity:     Worry: None     Inability: None     Transportation needs:     Medical: None     Non-medical: None   Tobacco Use     Smoking status: Former Smoker     Packs/day: 0.20     Years: 40.00     Pack years: 8.00     Types: Cigarettes     Last attempt to quit: 2008     Years since quittin.1     Smokeless tobacco: Never Used     Tobacco comment: occasional   Substance and Sexual Activity     Alcohol use: Not Currently     Alcohol/week: 35.0 standard drinks     Drug use: Not Currently     Sexual activity: None   Lifestyle     Physical activity:     Days per week: None     Minutes per session: None     Stress: None   Relationships     Social connections:     Talks on phone: None     Gets together: None     Attends [employer] service: None     Active member of club or organization: None     Attends meetings of clubs or organizations: None     Relationship status: None     Intimate partner violence:     Fear of current or ex partner: None     Emotionally abused: None     Physically abused: None     Forced sexual activity: None   Other Topics Concern     Parent/sibling w/ CABG, MI or angioplasty before 65F 55M? Not Asked   Social History Narrative     None       Review of Systems:  Skin:  Negative bruising   Eyes:  Positive for glasses  ENT:  Negative    Respiratory:  Positive for shortness of breath;dyspnea on exertion(sob with walking)  Cardiovascular:  Negative Positive for;exercise  "intolerance;fatigue  Gastroenterology: Negative    Genitourinary:  Positive for urinary frequency  Musculoskeletal:  Negative    Neurologic:  Negative    Psychiatric:  Positive for sleep disturbances  Heme/Lymph/Imm:  Negative    Endocrine:  Positive for diabetes    Physical Exam:  Vitals: BP (!) 164/95   Pulse 73   Ht 1.778 m (5' 10\")   Wt 80 kg (176 lb 4.8 oz)   BMI 25.30 kg/m       Constitutional:  cooperative;well developed;cooperative, alert and oriented, well developed, well nourished, in no acute distress        Skin:  warm and dry to the touch, no apparent skin lesions or masses noted        Head:  normocephalic        Eyes:  pupils equal and round        ENT:  no pallor or cyanosis        Neck:  JVP normal        Chest:      Diminished on posterior left side otherwise clear to auscultation    Cardiac: regular rhythm                  Abdomen:  abdomen soft obese      Vascular:       right radial artery;2+             left radial artery;2+                  Extremities and Back:  no edema;no deformities, clubbing, cyanosis, erythema observed        Neurological:  no gross motor deficits          Recent Lab Results:  LIPID RESULTS:  Lab Results   Component Value Date    CHOL 131 03/09/2009    HDL 48 03/09/2009    LDL 65 03/09/2009    TRIG 87 03/09/2009    CHOLHDLRATIO 2.7 03/09/2009       LIVER ENZYME RESULTS:  Lab Results   Component Value Date    AST 16 01/07/2020    ALT <5 (L) 01/07/2020       CBC RESULTS:  Lab Results   Component Value Date    WBC 13.6 (H) 12/30/2019    RBC 4.54 12/30/2019    HGB 12.5 (L) 12/30/2019    HCT 39.0 (L) 12/30/2019    MCV 86 12/30/2019    MCH 27.5 12/30/2019    MCHC 32.1 12/30/2019    RDW 15.4 (H) 12/30/2019     12/30/2019       BMP RESULTS:  Lab Results   Component Value Date     12/30/2019    POTASSIUM 3.8 12/30/2019    CHLORIDE 105 12/30/2019    CO2 26 12/30/2019    ANIONGAP 9 12/30/2019     (H) 12/30/2019    BUN 11 12/30/2019    CR 0.98 12/30/2019    " GFRESTIMATED 72 12/30/2019    GFRESTBLACK 84 12/30/2019    LLOYD 9.6 12/30/2019        A1C RESULTS:  Lab Results   Component Value Date    A1C 7.7 (H) 10/31/2019       INR RESULTS:  Lab Results   Component Value Date    INR 1.65 (H) 12/31/2019    INR 1.44 (H) 11/07/2019           CC  Quinton Paredes MD  [address]  [place] [address]              Thank you for allowing me to participate in the care of your patient.    Sincerely,     OMAR Vazquez [facility]

## 2020-02-26 ENCOUNTER — HOSPITAL ENCOUNTER (OUTPATIENT)
Dept: ULTRASOUND IMAGING | Facility: CLINIC | Age: 80
End: 2020-02-26
Attending: FAMILY MEDICINE
Payer: COMMERCIAL

## 2020-02-26 ENCOUNTER — HOSPITAL ENCOUNTER (OUTPATIENT)
Facility: CLINIC | Age: 80
Discharge: HOME | End: 2020-02-26
Admitting: FAMILY MEDICINE
Payer: COMMERCIAL

## 2020-02-26 VITALS
HEIGHT: 70 IN | BODY MASS INDEX: 25.2 KG/M2 | SYSTOLIC BLOOD PRESSURE: 132 MMHG | DIASTOLIC BLOOD PRESSURE: 60 MMHG | WEIGHT: 176 LBS | HEART RATE: 60 BPM | RESPIRATION RATE: 16 BRPM | OXYGEN SATURATION: 98 %

## 2020-02-26 DIAGNOSIS — J94.8 PLEURAL EFFUSION TRANSUDATIVE: ICD-10-CM

## 2020-02-26 LAB
INR PPP: 1.28 (ref 0.86–1.14)
PLATELET # BLD AUTO: 284 10E9/L (ref 150–450)

## 2020-02-26 PROCEDURE — 25000125 ZZHC RX 250: Performed by: RADIOLOGY

## 2020-02-26 PROCEDURE — 32555 ASPIRATE PLEURA W/ IMAGING: CPT

## 2020-02-26 PROCEDURE — 36415 COLL VENOUS BLD VENIPUNCTURE: CPT | Performed by: PHYSICIAN ASSISTANT

## 2020-02-26 PROCEDURE — 40000863 ZZH STATISTIC RADIOLOGY XRAY, US, CT, MAR, NM

## 2020-02-26 PROCEDURE — 85049 AUTOMATED PLATELET COUNT: CPT | Performed by: PHYSICIAN ASSISTANT

## 2020-02-26 PROCEDURE — 85610 PROTHROMBIN TIME: CPT | Performed by: PHYSICIAN ASSISTANT

## 2020-02-26 RX ORDER — NICOTINE POLACRILEX 4 MG
15-30 LOZENGE BUCCAL
Status: DISCONTINUED | OUTPATIENT
Start: 2020-02-26 | End: 2020-02-26 | Stop reason: HOSPADM

## 2020-02-26 RX ORDER — LIDOCAINE HYDROCHLORIDE 10 MG/ML
10 INJECTION, SOLUTION EPIDURAL; INFILTRATION; INTRACAUDAL; PERINEURAL ONCE
Status: COMPLETED | OUTPATIENT
Start: 2020-02-26 | End: 2020-02-26

## 2020-02-26 RX ORDER — DEXTROSE MONOHYDRATE 25 G/50ML
25-50 INJECTION, SOLUTION INTRAVENOUS
Status: DISCONTINUED | OUTPATIENT
Start: 2020-02-26 | End: 2020-02-26 | Stop reason: HOSPADM

## 2020-02-26 RX ADMIN — LIDOCAINE HYDROCHLORIDE 10 ML: 10 INJECTION, SOLUTION EPIDURAL; INFILTRATION; INTRACAUDAL; PERINEURAL at 10:41

## 2020-02-26 ASSESSMENT — MIFFLIN-ST. JEOR: SCORE: 1509.58

## 2020-02-26 NOTE — PLAN OF CARE
Pt has insulin pump and subdermal blood glucose monitor. Current blood sugar 237, pt self-administered bolus of 2.3 units of Novolog via insulin pump.

## 2020-02-26 NOTE — PROGRESS NOTES
Care Suites Procedure Nursing Note    Patient Information  Name: Tc Garcia  Age: 80 year old    Procedure  Procedure: thoracentesis to left side, 2450 ml of clear yellow  fluid removed, VSS pt denies pain, tolerated procedure well, bandage applied to site  Procedure start time:1040  Procedure complete time: 1102  Concerns/abnormal assessment: no  If abnormal assessment, provider notified: No  Plan/Other: proceed    Sarah Pastor RN

## 2020-02-26 NOTE — PROGRESS NOTES
Care Suites Post Procedure Note    Patient Information  Name: Tc Garcia  Age: 80 year old    Post Procedure  Procedure: left thoracentesis   Time patient returned to Care Suites: 1106  Concerns/abnormal assessment: none  If abnormal assessment, provider notified:   NA  Plan/Other: Continue post procedure plan of care    Sapphire Stallworth RN     Care Suites Discharge Nursing Note    Patient Information  Name: Tc Garcia  Age: 80 year old    Discharge Education:  Discharge instructions reviewed: Yes  Additional education/resources provided: NA  Patient/patient representative verbalizes understanding: yes   Discharge Plans:      Discharge ride contacted: family is here and walk with pt.  Approximate discharge time: 1140    Discharge Criteria:  Discharge criteria met and vital signs stable:  yes  Patient Belongs:  Patient belongings returned to patient: yes    Sapphire Stallworth RN

## 2020-02-26 NOTE — DISCHARGE INSTRUCTIONS
Thoracentesis Discharge Instructions     After you go home:      You may resume your normal diet    Care of Puncture Site:      For the first 48 hrs, check your puncture site every couple hours while you are awake     If there is a bandaid - you may remove it tomorrow morning    You may shower tomorrow    No tub baths, whirlpools or swimming until your puncture site has fully healed     Activity:      You may go back to normal activity in 24 hours    Wait 48 hours before lifting, straining, exercise or other strenuous activity    Medicines:      You may resume all your medications    For minor pain, you may take Acetaminophen (Tylenol) or Ibuprofen (Advil)            Call the provider who ordered this procedure if:      The site is red, swollen, hot or tender    Blood or fluid is draining from the site    You have chills or a fever greater than 101 F (38 C)    Shortness of breath    Pain that is getting worse    Any questions or concerns      Additional Information:      During this test, a needle will sometimes enter the lung. This can cause the lung to collapse - called a pneumothorax. Symptoms include severe chest pain, breathing problems or increased blood in your sputum (phlegm).     Call  911 or go to the Emergency Room if:      Severe chest pain or trouble breathing    Increased blood in your sputum (phlegm)    Bleeding that you cannot control      If you have questions call:        [facility] Radiology Dept @ [phone]      The provider who performed your procedure was Dr. Go.

## 2020-02-26 NOTE — PLAN OF CARE
Care Suites Admission Nursing Note    Patient Information  Name: Tc Garcia  Age: 80 year old  Reason for admission: thoracentesis  Care Suites arrival time: 0911    Patient Admission/Assessment   Pre-procedure assessment complete: YES  If abnormal assessment/labs, provider notified: N/A  NPO: NO  Medications held per instructions/orders: YES, Eliquis held yesterday evening and this morning  Consent: deferred  Patient oriented to room: YES  Education/questions answered: YES, discharge instructions reviewed with pt and spouse, questions answered, they deny concerns at this time.  Plan/other: plan for thoracentesis, scheduled for 1030    Discharge Planning  Accompanied by: spouse Radha  Discharge name/phone number: Radha [phone]  Overnight post sedation caregiver: Radha  Discharge location: home    Ame Neri RN

## 2020-03-05 ENCOUNTER — TRANSFERRED RECORDS (OUTPATIENT)
Dept: HEALTH INFORMATION MANAGEMENT | Facility: CLINIC | Age: 80
End: 2020-03-05

## 2020-03-20 RX ORDER — DEXTROSE MONOHYDRATE 25 G/50ML
25-50 INJECTION, SOLUTION INTRAVENOUS
Status: CANCELLED | OUTPATIENT
Start: 2020-03-20

## 2020-03-20 RX ORDER — NICOTINE POLACRILEX 4 MG
15-30 LOZENGE BUCCAL
Status: CANCELLED | OUTPATIENT
Start: 2020-03-20

## 2020-03-21 ENCOUNTER — TELEPHONE (OUTPATIENT)
Dept: INTERVENTIONAL RADIOLOGY/VASCULAR | Facility: CLINIC | Age: 80
End: 2020-03-21

## 2020-03-23 ENCOUNTER — HOSPITAL ENCOUNTER (OUTPATIENT)
Facility: CLINIC | Age: 80
Discharge: HOME | End: 2020-03-23
Admitting: FAMILY MEDICINE
Payer: COMMERCIAL

## 2020-03-23 ENCOUNTER — HOSPITAL ENCOUNTER (OUTPATIENT)
Dept: ULTRASOUND IMAGING | Facility: CLINIC | Age: 80
End: 2020-03-23
Attending: FAMILY MEDICINE
Payer: COMMERCIAL

## 2020-03-23 VITALS
WEIGHT: 165 LBS | DIASTOLIC BLOOD PRESSURE: 65 MMHG | TEMPERATURE: 95.4 F | HEART RATE: 68 BPM | OXYGEN SATURATION: 96 % | BODY MASS INDEX: 23.62 KG/M2 | SYSTOLIC BLOOD PRESSURE: 132 MMHG | HEIGHT: 70 IN | RESPIRATION RATE: 18 BRPM

## 2020-03-23 DIAGNOSIS — J94.8: ICD-10-CM

## 2020-03-23 PROCEDURE — 25000125 ZZHC RX 250: Performed by: PHYSICIAN ASSISTANT

## 2020-03-23 PROCEDURE — 27210190 US THORACENTESIS

## 2020-03-23 PROCEDURE — 40000863 ZZH STATISTIC RADIOLOGY XRAY, US, CT, MAR, NM

## 2020-03-23 RX ORDER — LIDOCAINE HYDROCHLORIDE 10 MG/ML
10 INJECTION, SOLUTION EPIDURAL; INFILTRATION; INTRACAUDAL; PERINEURAL ONCE
Status: COMPLETED | OUTPATIENT
Start: 2020-03-23 | End: 2020-03-23

## 2020-03-23 RX ORDER — DEXTROSE MONOHYDRATE 25 G/50ML
25-50 INJECTION, SOLUTION INTRAVENOUS
Status: DISCONTINUED | OUTPATIENT
Start: 2020-03-23 | End: 2020-03-23 | Stop reason: HOSPADM

## 2020-03-23 RX ORDER — NICOTINE POLACRILEX 4 MG
15-30 LOZENGE BUCCAL
Status: DISCONTINUED | OUTPATIENT
Start: 2020-03-23 | End: 2020-03-23 | Stop reason: HOSPADM

## 2020-03-23 RX ADMIN — LIDOCAINE HYDROCHLORIDE 10 ML: 10 INJECTION, SOLUTION EPIDURAL; INFILTRATION; INTRACAUDAL; PERINEURAL at 08:57

## 2020-03-23 ASSESSMENT — MIFFLIN-ST. JEOR: SCORE: 1459.69

## 2020-03-23 NOTE — PROGRESS NOTES
Care Suites Admission Nursing Note    Patient Information  Name: Tc Garcia  Age: 80 year old  Reason for admission: thoracentesis  Care Suites arrival time: 0810    Patient Admission/Assessment   Pre-procedure assessment complete: Yes  If abnormal assessment/labs, provider notified: labs normal  NPO: N/A  Medications held per instructions/orders: N/A  Consent: obtained  If applicable, pregnancy test status: n/a  Patient oriented to room: Yes  Education/questions answered: Yes  Plan/other: proceed    Discharge Planning  Accompanied by: self  Discharge name/phone number:  Overnight post sedation caregiver:n/a  Discharge location: home    Héctor Cross RN     PATIENT WELLNESS SCREENING    Step 1: Answer all screening questions 1-3.    1. In the last month, have you been in contact with someone who was confirmed or suspected to have Coronavirus/COVID-19? No    2. Do you have the following symptoms?   Fever? No   Cough? No   Shortness of breath? No   Skin rash? No    3. Have you traveled internationally in the last month? No   If so, where? n/a    Step 2: Refer to logic grid below for actions    NO SYMPTOM(S)    ACTIONS:  1. Standard rooming process  2. Provider to assess per normal protocol    POSITIVE SYMPTOM(S)  If positive for ANY of the following symptoms: fever, cough, shortness of breath, rash    ACTIONS  1. Mask patient  2. Room patient as soon as possible  3. Don appropriate PPE when entering room  4. Provider evaluation

## 2020-03-23 NOTE — DISCHARGE INSTRUCTIONS
Thoracentesis Discharge Instructions     After you go home:      You may resume your normal diet    Care of Puncture Site:      For the first 48 hrs, check your puncture site every couple hours while you are awake     If there is a bandaid - you may remove it tomorrow morning    You may shower tomorrow    No tub baths, whirlpools or swimming until your puncture site has fully healed     Activity:      You may go back to normal activity in 24 hours    Wait 48 hours before lifting, straining, exercise or other strenuous activity    Medicines:      You may resume all your medications    For minor pain, you may take Acetaminophen (Tylenol) or Ibuprofen (Advil)            Call the provider who ordered this procedure if:      The site is red, swollen, hot or tender    Blood or fluid is draining from the site    You have chills or a fever greater than 101 F (38 C)    Shortness of breath    Pain that is getting worse    Any questions or concerns      Additional Information:      During this test, a needle will sometimes enter the lung. This can cause the lung to collapse - called a pneumothorax. Symptoms include severe chest pain, breathing problems or increased blood in your sputum (phlegm).     Call  911 or go to the Emergency Room if:      Severe chest pain or trouble breathing    Increased blood in your sputum (phlegm)    Bleeding that you cannot control      If you have questions call:        [facility] Radiology Dept @ [phone]

## 2020-03-23 NOTE — PROCEDURES
[facility]    Procedure: Left thoracentesis    Date/Time: 3/23/2020 8:30 AM  Performed by: Tresa Arias PA-C  Authorized by: Tresa Arias PA-C     UNIVERSAL PROTOCOL   Site Marked: Yes  Prior Images Obtained and Reviewed:  Yes  Required items: Required blood products, implants, devices and special equipment available    Patient identity confirmed:  Verbally with patient  NA - No sedation, light sedation, or local anesthesia  Confirmation Checklist:  Patient's identity using two indicators, relevant allergies, procedure was appropriate and matched the consent or emergent situation and correct equipment/implants were available  Time out: Immediately prior to the procedure a time out was called    Universal Protocol: the Joint Commission Universal Protocol was followed    Preparation: Patient was prepped and draped in usual sterile fashion           ANESTHESIA    Anesthesia: Local infiltration  Local Anesthetic:  Lidocaine 1% without epinephrine  Anesthetic Total (mL):  10      SEDATION    Patient Sedated: No    See dictated procedure note for full details.  PROCEDURE   Patient Tolerance:  Patient tolerated the procedure well with no immediate complications  Describe Procedure: Left thoracentesis  Aranza colored fluid removed.  Length of time physician/provider present for 1:1 monitoring during sedation: 0

## 2020-03-23 NOTE — PROGRESS NOTES
Care Suites Post Procedure Summary (without sedation)     Immediately prior to starting the procedure a Time Out was conducted with procedural staff and re-confirmed the patient s name, procedure, and site/side.      Consent obtained from patient after discussing the risks, benefits and alternatives.      Procedure: Thoracentesis    Procedure Interventions:    Fluid (cc) removed: Yes. 1750 ml  of yellow fluid removed from left lung without difficulties      Tube/Drain placed: NA.    Patient tolerance: Patient tolerated the procedure well with no immediate complications.  Site dressed by tech with band aid at left mid back. No bleeding at completion.    Post-procedure report given to: Cherise GRIJALVA and pt transferred to care suites .    (See Doc Flow-sheets and MAR for additional information)

## 2020-03-23 NOTE — PROGRESS NOTES
Care Suites Discharge Summary    Discharge Criteria:   Discharge Criteria met per MD orders: Yes.   Vital signs stable.     Pt demonstrates ability to ambulate safely: Yes.  (See discharge questionnaire for additional information)    Discharge instructions & education:   Discharge instructions reviewed with patient . Patient verbalizes understanding.   Additional patient education provided:  thoracentesis    Medications:   Patient will be discharging on new medications- No. Patient verbalizes reason for use, start date, and side effects NA.    Items returned to patient:   Home and hospital acquired medications returned to patient NA   Listed belongings gathered and returned to patient: Yes    Patient discharged to home.     Héctor Cross RN

## 2020-04-16 ENCOUNTER — HOSPITAL ENCOUNTER (OUTPATIENT)
Facility: CLINIC | Age: 80
Discharge: HOME | End: 2020-04-16
Admitting: FAMILY MEDICINE
Payer: COMMERCIAL

## 2020-04-16 ENCOUNTER — HOSPITAL ENCOUNTER (OUTPATIENT)
Dept: ULTRASOUND IMAGING | Facility: CLINIC | Age: 80
End: 2020-04-16
Attending: FAMILY MEDICINE
Payer: COMMERCIAL

## 2020-04-16 VITALS
TEMPERATURE: 95.5 F | SYSTOLIC BLOOD PRESSURE: 137 MMHG | BODY MASS INDEX: 23.7 KG/M2 | OXYGEN SATURATION: 100 % | RESPIRATION RATE: 16 BRPM | HEIGHT: 69 IN | DIASTOLIC BLOOD PRESSURE: 69 MMHG | WEIGHT: 160 LBS | HEART RATE: 78 BPM

## 2020-04-16 DIAGNOSIS — J94.8 TRANSUDATIVE PLEURAL EFFUSION: ICD-10-CM

## 2020-04-16 LAB
INR PPP: 1.88 (ref 0.86–1.14)
PLATELET # BLD AUTO: 258 10E9/L (ref 150–450)

## 2020-04-16 PROCEDURE — 36415 COLL VENOUS BLD VENIPUNCTURE: CPT | Performed by: PHYSICIAN ASSISTANT

## 2020-04-16 PROCEDURE — 85610 PROTHROMBIN TIME: CPT | Performed by: PHYSICIAN ASSISTANT

## 2020-04-16 PROCEDURE — 85049 AUTOMATED PLATELET COUNT: CPT | Performed by: PHYSICIAN ASSISTANT

## 2020-04-16 PROCEDURE — 25000125 ZZHC RX 250: Performed by: RADIOLOGY

## 2020-04-16 PROCEDURE — 27210190 US THORACENTESIS

## 2020-04-16 PROCEDURE — 40000863 ZZH STATISTIC RADIOLOGY XRAY, US, CT, MAR, NM

## 2020-04-16 RX ORDER — DEXTROSE MONOHYDRATE 25 G/50ML
25-50 INJECTION, SOLUTION INTRAVENOUS
Status: DISCONTINUED | OUTPATIENT
Start: 2020-04-16 | End: 2020-04-16

## 2020-04-16 RX ORDER — NICOTINE POLACRILEX 4 MG
15-30 LOZENGE BUCCAL
Status: DISCONTINUED | OUTPATIENT
Start: 2020-04-16 | End: 2020-04-16 | Stop reason: HOSPADM

## 2020-04-16 RX ORDER — NICOTINE POLACRILEX 4 MG
15-30 LOZENGE BUCCAL
Status: CANCELLED | OUTPATIENT
Start: 2020-04-16

## 2020-04-16 RX ORDER — LIDOCAINE HYDROCHLORIDE 10 MG/ML
10 INJECTION, SOLUTION EPIDURAL; INFILTRATION; INTRACAUDAL; PERINEURAL ONCE
Status: COMPLETED | OUTPATIENT
Start: 2020-04-16 | End: 2020-04-16

## 2020-04-16 RX ORDER — DEXTROSE MONOHYDRATE 25 G/50ML
25-50 INJECTION, SOLUTION INTRAVENOUS
Status: DISCONTINUED | OUTPATIENT
Start: 2020-04-16 | End: 2020-04-16 | Stop reason: HOSPADM

## 2020-04-16 RX ORDER — NICOTINE POLACRILEX 4 MG
15-30 LOZENGE BUCCAL
Status: DISCONTINUED | OUTPATIENT
Start: 2020-04-16 | End: 2020-04-16

## 2020-04-16 RX ORDER — DEXTROSE MONOHYDRATE 25 G/50ML
25-50 INJECTION, SOLUTION INTRAVENOUS
Status: CANCELLED | OUTPATIENT
Start: 2020-04-16

## 2020-04-16 RX ADMIN — LIDOCAINE HYDROCHLORIDE 10 ML: 10 INJECTION, SOLUTION EPIDURAL; INFILTRATION; INTRACAUDAL; PERINEURAL at 09:24

## 2020-04-16 ASSESSMENT — MIFFLIN-ST. JEOR: SCORE: 1421.14

## 2020-04-16 NOTE — DISCHARGE INSTRUCTIONS
Thoracentesis Discharge Instructions     After you go home:      You may resume your normal diet    Care of Puncture Site:      For the first 48 hrs, check your puncture site every couple hours while you are awake     If there is a bandaid - you may remove it tomorrow morning    You may shower tomorrow    No tub baths, whirlpools or swimming until your puncture site has fully healed     Activity:      You may go back to normal activity in 24 hours    Wait 48 hours before lifting, straining, exercise or other strenuous activity    Medicines:      You may resume all your medications    For minor pain, you may take Acetaminophen (Tylenol) or Ibuprofen (Advil)            Call the provider who ordered this procedure if:      The site is red, swollen, hot or tender    Blood or fluid is draining from the site    You have chills or a fever greater than 101 F (38 C)    Shortness of breath    Pain that is getting worse    Any questions or concerns      Additional Information:      During this test, a needle will sometimes enter the lung. This can cause the lung to collapse - called a pneumothorax. Symptoms include severe chest pain, breathing problems or increased blood in your sputum (phlegm).     Call  911 or go to the Emergency Room if:      Severe chest pain or trouble breathing    Increased blood in your sputum (phlegm)    Bleeding that you cannot control      If you have questions call:        [facility] Radiology Dept @ [phone]

## 2020-04-16 NOTE — PROGRESS NOTES
Care Suites Admission Nursing Note    Patient Information  Name: Tc Garcia  Age: 80 year old  Reason for admission: Thoracentesis.  Care Suites arrival time: 0803    Patient Admission/Assessment   Pre-procedure assessment complete: Yes  If abnormal assessment/labs, provider notified:  Labs pending.  NPO: N/A  Medications held per instructions/orders: Yes. Last dose of Eliquis was yesterday morning.  Consent: deferred  If applicable, pregnancy test status: NA  Patient oriented to room: Yes  Education/questions answered: Yes  Plan/other: Thoracentesis then home.    Discharge Planning  Accompanied by: Self  Discharge name/phone number: NA  Overnight post sedation caregiver: NA  Discharge location: home    Emily Baum RN

## 2020-04-16 NOTE — PROGRESS NOTES
Care Suites Discharge Nursing Note    Patient Information  Name: Tc Garcia  Age: 80 year old    Discharge Education:  Discharge instructions reviewed: Yes.  AVS discharge instructions given and reviewed with verbal understanding received.  Additional education/resources provided: No.  Patient/patient representative verbalizes understanding: Yes  Patient discharging on new medications: N/A  Medication education completed: N/A    Discharge Plans:   Discharge location: home  Discharge ride contacted: N/A  Approximate discharge time: 1014    Discharge Criteria:  Discharge criteria met and vital signs stable: Yes    Patient Belongs:  Patient belongings returned to patient: Yes    Emily Baum RN

## 2020-04-16 NOTE — PROCEDURES
[facility]    Procedure: Left thoracentesis    Date/Time: 4/16/2020 9:30 AM  Performed by: Josefina Lovett DO  Authorized by: Josefina Lovett DO     UNIVERSAL PROTOCOL   Site Marked: Yes  Prior Images Obtained and Reviewed:  Yes  Required items: Required blood products, implants, devices and special equipment available    Patient identity confirmed:  Verbally with patient, arm band, provided demographic data and hospital-assigned identification number  Patient was reevaluated immediately before administering moderate or deep sedation or anesthesia  Confirmation Checklist:  Patient's identity using two indicators, relevant allergies, procedure was appropriate and matched the consent or emergent situation and correct equipment/implants were available  Time out: Immediately prior to the procedure a time out was called    Universal Protocol: the Joint Commission Universal Protocol was followed    Preparation: Patient was prepped and draped in usual sterile fashion           ANESTHESIA    Anesthesia: Local infiltration  Local Anesthetic:  Lidocaine 1% without epinephrine      SEDATION    Patient Sedated: No    See dictated procedure note for full details.  Findings: Left thoracentesis    Specimens: none    Complications: None    Condition: Stable    Plan: Repeat as needed.     PROCEDURE   Patient Tolerance:  Patient tolerated the procedure well with no immediate complications    Length of time physician/provider present for 1:1 monitoring during sedation: 0

## 2020-04-16 NOTE — PROGRESS NOTES
0920 Time Out done.  Consent signed    Thoracentesis: Pt tolerated well.  VSS. 1450 cc izabel fluid removed without difficulty. Bandaid applied to site - CDI. No CXR post procedure  per Dr Lovett.    0940 Pt back to Care Suites #12 per cart & transport. Detailed report called to Emily bedside RN

## 2020-04-16 NOTE — PROGRESS NOTES
PATIENT WELLNESS SCREENING    Step 1: Answer all screening questions 1-3.    1. In the last month, have you been in contact with someone who was confirmed or suspected to have Coronavirus/COVID-19? No    2. Do you have the following symptoms?   Fever? No   Cough? No   Shortness of breath? Yes: Hx of pleural effusions.   Skin rash? No    3. Have you traveled internationally in the last month? No   If so, where? NA    Step 2: Refer to logic grid below for actions    NO SYMPTOM(S)    ACTIONS:  1. Standard rooming process  2. Provider to assess per normal protocol    POSITIVE SYMPTOM(S)  If positive for ANY of the following symptoms: fever, cough, shortness of breath, rash    ACTIONS  1. Mask patient  2. Room patient as soon as possible  3. Don appropriate PPE when entering room  4. Provider evaluation

## 2020-04-16 NOTE — PROGRESS NOTES
Care Suites Post Procedure Note    Patient Information  Name: Tc Garcia  Age: 80 year old    Post Procedure  Procedure: Thoracentesis.  Time patient returned to Care Suites: 0940  Concerns/abnormal assessment: No  If abnormal assessment, provider notified: N/A  Plan/Other: Monitor x 30 minutes, site remains CDI, no swelling/hematoma and tolerates PO.    Anticipate discharge to home at 1010    Emily Baum RN

## 2020-04-25 ENCOUNTER — HOSPITAL ENCOUNTER (INPATIENT)
Facility: CLINIC | Age: 80
LOS: 5 days | Discharge: HOME | DRG: 683 | End: 2020-04-30
Attending: EMERGENCY MEDICINE | Admitting: INTERNAL MEDICINE
Payer: COMMERCIAL

## 2020-04-25 ENCOUNTER — APPOINTMENT (OUTPATIENT)
Dept: GENERAL RADIOLOGY | Facility: CLINIC | Age: 80
DRG: 683 | End: 2020-04-25
Attending: EMERGENCY MEDICINE
Payer: COMMERCIAL

## 2020-04-25 DIAGNOSIS — N17.9 ACUTE RENAL FAILURE, UNSPECIFIED ACUTE RENAL FAILURE TYPE (H): ICD-10-CM

## 2020-04-25 DIAGNOSIS — N17.9 AKI (ACUTE KIDNEY INJURY) (H): Primary | ICD-10-CM

## 2020-04-25 DIAGNOSIS — E86.0 DEHYDRATION: ICD-10-CM

## 2020-04-25 DIAGNOSIS — I48.20 CHRONIC ATRIAL FIBRILLATION (H): ICD-10-CM

## 2020-04-25 LAB
ALBUMIN SERPL-MCNC: 3 G/DL (ref 3.4–5)
ALBUMIN UR-MCNC: 30 MG/DL
ALBUMIN UR-MCNC: NORMAL MG/DL
ALP SERPL-CCNC: 79 U/L (ref 40–150)
ALT SERPL W P-5'-P-CCNC: 21 U/L (ref 0–70)
ANION GAP SERPL CALCULATED.3IONS-SCNC: 10 MMOL/L (ref 3–14)
APPEARANCE UR: ABNORMAL
APPEARANCE UR: NORMAL
AST SERPL W P-5'-P-CCNC: 21 U/L (ref 0–45)
BACTERIA #/AREA URNS HPF: NORMAL /HPF
BASOPHILS # BLD AUTO: 0 10E9/L (ref 0–0.2)
BASOPHILS NFR BLD AUTO: 0.2 %
BILIRUB SERPL-MCNC: 0.4 MG/DL (ref 0.2–1.3)
BILIRUB UR QL STRIP: NEGATIVE
BILIRUB UR QL STRIP: NORMAL
BUN SERPL-MCNC: 53 MG/DL (ref 7–30)
CALCIUM SERPL-MCNC: 9.2 MG/DL (ref 8.5–10.1)
CHLORIDE SERPL-SCNC: 102 MMOL/L (ref 94–109)
CO2 SERPL-SCNC: 26 MMOL/L (ref 20–32)
COLOR UR AUTO: ABNORMAL
COLOR UR AUTO: NORMAL
CREAT SERPL-MCNC: 3.06 MG/DL (ref 0.66–1.25)
CREAT UR-MCNC: 105 MG/DL
DIFFERENTIAL METHOD BLD: ABNORMAL
EOSINOPHIL # BLD AUTO: 0.1 10E9/L (ref 0–0.7)
EOSINOPHIL NFR BLD AUTO: 0.6 %
ERYTHROCYTE [DISTWIDTH] IN BLOOD BY AUTOMATED COUNT: 18.9 % (ref 10–15)
FRACT EXCRET NA UR+SERPL-RTO: 0.3 %
GFR SERPL CREATININE-BSD FRML MDRD: 18 ML/MIN/{1.73_M2}
GLUCOSE BLDC GLUCOMTR-MCNC: 388 MG/DL (ref 70–99)
GLUCOSE BLDC GLUCOMTR-MCNC: 64 MG/DL (ref 70–99)
GLUCOSE BLDC GLUCOMTR-MCNC: 68 MG/DL (ref 70–99)
GLUCOSE BLDC GLUCOMTR-MCNC: 82 MG/DL (ref 70–99)
GLUCOSE SERPL-MCNC: 112 MG/DL (ref 70–99)
GLUCOSE UR STRIP-MCNC: 30 MG/DL
GLUCOSE UR STRIP-MCNC: NORMAL MG/DL
HBA1C MFR BLD: 8.2 % (ref 0–5.6)
HCT VFR BLD AUTO: 32.1 % (ref 40–53)
HGB BLD-MCNC: 10.3 G/DL (ref 13.3–17.7)
HGB UR QL STRIP: ABNORMAL
HGB UR QL STRIP: NORMAL
IMM GRANULOCYTES # BLD: 0.1 10E9/L (ref 0–0.4)
IMM GRANULOCYTES NFR BLD: 0.7 %
INTERPRETATION ECG - MUSE: NORMAL
KETONES UR STRIP-MCNC: 5 MG/DL
KETONES UR STRIP-MCNC: NORMAL MG/DL
LACTATE BLD-SCNC: 2 MMOL/L (ref 0.7–2)
LEUKOCYTE ESTERASE UR QL STRIP: ABNORMAL
LEUKOCYTE ESTERASE UR QL STRIP: NORMAL
LYMPHOCYTES # BLD AUTO: 0.9 10E9/L (ref 0.8–5.3)
LYMPHOCYTES NFR BLD AUTO: 8.3 %
MCH RBC QN AUTO: 27 PG (ref 26.5–33)
MCHC RBC AUTO-ENTMCNC: 32.1 G/DL (ref 31.5–36.5)
MCV RBC AUTO: 84 FL (ref 78–100)
MONOCYTES # BLD AUTO: 0.7 10E9/L (ref 0–1.3)
MONOCYTES NFR BLD AUTO: 6.7 %
NEUTROPHILS # BLD AUTO: 8.8 10E9/L (ref 1.6–8.3)
NEUTROPHILS NFR BLD AUTO: 83.5 %
NITRATE UR QL: NEGATIVE
NITRATE UR QL: NORMAL
NRBC # BLD AUTO: 0 10*3/UL
NRBC BLD AUTO-RTO: 0 /100
PH UR STRIP: 5.5 PH (ref 5–7)
PH UR STRIP: NORMAL PH (ref 5–7)
PLATELET # BLD AUTO: 383 10E9/L (ref 150–450)
POTASSIUM SERPL-SCNC: 4.1 MMOL/L (ref 3.4–5.3)
PROT SERPL-MCNC: 6.3 G/DL (ref 6.8–8.8)
RBC # BLD AUTO: 3.82 10E12/L (ref 4.4–5.9)
RBC #/AREA URNS AUTO: >182 /HPF (ref 0–2)
RBC #/AREA URNS AUTO: NORMAL /HPF (ref 0–2)
SARS-COV-2 PCR COMMENT: NORMAL
SARS-COV-2 RNA SPEC QL NAA+PROBE: NEGATIVE
SARS-COV-2 RNA SPEC QL NAA+PROBE: NORMAL
SODIUM SERPL-SCNC: 138 MMOL/L (ref 133–144)
SODIUM UR-SCNC: 12 MMOL/L
SOURCE: ABNORMAL
SOURCE: NORMAL
SP GR UR STRIP: 1.01 (ref 1–1.03)
SP GR UR STRIP: NORMAL (ref 1–1.03)
SPECIMEN SOURCE: NORMAL
SPECIMEN SOURCE: NORMAL
T4 FREE SERPL-MCNC: 1.51 NG/DL (ref 0.76–1.46)
TSH SERPL DL<=0.005 MIU/L-ACNC: 4.39 MU/L (ref 0.4–4)
UROBILINOGEN UR STRIP-MCNC: NORMAL MG/DL (ref 0–2)
UROBILINOGEN UR STRIP-MCNC: NORMAL MG/DL (ref 0–2)
WBC # BLD AUTO: 10.5 10E9/L (ref 4–11)
WBC #/AREA URNS AUTO: 5 /HPF (ref 0–5)
WBC #/AREA URNS AUTO: NORMAL /HPF

## 2020-04-25 PROCEDURE — 00000146 ZZHCL STATISTIC GLUCOSE BY METER IP

## 2020-04-25 PROCEDURE — 25800030 ZZH RX IP 258 OP 636: Performed by: INTERNAL MEDICINE

## 2020-04-25 PROCEDURE — 83605 ASSAY OF LACTIC ACID: CPT | Performed by: EMERGENCY MEDICINE

## 2020-04-25 PROCEDURE — 12000000 ZZH R&B MED SURG/OB

## 2020-04-25 PROCEDURE — 81015 MICROSCOPIC EXAM OF URINE: CPT | Performed by: EMERGENCY MEDICINE

## 2020-04-25 PROCEDURE — 99223 1ST HOSP IP/OBS HIGH 75: CPT | Mod: AI | Performed by: INTERNAL MEDICINE

## 2020-04-25 PROCEDURE — 83036 HEMOGLOBIN GLYCOSYLATED A1C: CPT | Performed by: EMERGENCY MEDICINE

## 2020-04-25 PROCEDURE — 71045 X-RAY EXAM CHEST 1 VIEW: CPT

## 2020-04-25 PROCEDURE — 25000132 ZZH RX MED GY IP 250 OP 250 PS 637: Performed by: INTERNAL MEDICINE

## 2020-04-25 PROCEDURE — 81001 URINALYSIS AUTO W/SCOPE: CPT | Performed by: INTERNAL MEDICINE

## 2020-04-25 PROCEDURE — 84439 ASSAY OF FREE THYROXINE: CPT | Performed by: EMERGENCY MEDICINE

## 2020-04-25 PROCEDURE — 84300 ASSAY OF URINE SODIUM: CPT | Performed by: INTERNAL MEDICINE

## 2020-04-25 PROCEDURE — 25000131 ZZH RX MED GY IP 250 OP 636 PS 637: Performed by: INTERNAL MEDICINE

## 2020-04-25 PROCEDURE — 96361 HYDRATE IV INFUSION ADD-ON: CPT

## 2020-04-25 PROCEDURE — 25800030 ZZH RX IP 258 OP 636: Performed by: EMERGENCY MEDICINE

## 2020-04-25 PROCEDURE — 96374 THER/PROPH/DIAG INJ IV PUSH: CPT

## 2020-04-25 PROCEDURE — 84443 ASSAY THYROID STIM HORMONE: CPT | Performed by: EMERGENCY MEDICINE

## 2020-04-25 PROCEDURE — 25000128 H RX IP 250 OP 636: Performed by: EMERGENCY MEDICINE

## 2020-04-25 PROCEDURE — 80053 COMPREHEN METABOLIC PANEL: CPT | Performed by: EMERGENCY MEDICINE

## 2020-04-25 PROCEDURE — 99285 EMERGENCY DEPT VISIT HI MDM: CPT | Mod: 25

## 2020-04-25 PROCEDURE — 93005 ELECTROCARDIOGRAM TRACING: CPT

## 2020-04-25 PROCEDURE — 82570 ASSAY OF URINE CREATININE: CPT | Performed by: INTERNAL MEDICINE

## 2020-04-25 PROCEDURE — 87506 IADNA-DNA/RNA PROBE TQ 6-11: CPT | Performed by: INTERNAL MEDICINE

## 2020-04-25 PROCEDURE — 85025 COMPLETE CBC W/AUTO DIFF WBC: CPT | Performed by: EMERGENCY MEDICINE

## 2020-04-25 PROCEDURE — 87635 SARS-COV-2 COVID-19 AMP PRB: CPT | Performed by: EMERGENCY MEDICINE

## 2020-04-25 RX ORDER — POLYETHYLENE GLYCOL 3350 17 G/17G
17 POWDER, FOR SOLUTION ORAL DAILY PRN
Status: DISCONTINUED | OUTPATIENT
Start: 2020-04-25 | End: 2020-04-30 | Stop reason: HOSPADM

## 2020-04-25 RX ORDER — TIOTROPIUM BROMIDE 18 UG/1
18 CAPSULE ORAL; RESPIRATORY (INHALATION) DAILY
Status: ON HOLD | COMMUNITY
End: 2020-10-27

## 2020-04-25 RX ORDER — NALOXONE HYDROCHLORIDE 0.4 MG/ML
.1-.4 INJECTION, SOLUTION INTRAMUSCULAR; INTRAVENOUS; SUBCUTANEOUS
Status: DISCONTINUED | OUTPATIENT
Start: 2020-04-25 | End: 2020-04-30 | Stop reason: HOSPADM

## 2020-04-25 RX ORDER — ONDANSETRON 2 MG/ML
4 INJECTION INTRAMUSCULAR; INTRAVENOUS EVERY 6 HOURS PRN
Status: DISCONTINUED | OUTPATIENT
Start: 2020-04-25 | End: 2020-04-30 | Stop reason: HOSPADM

## 2020-04-25 RX ORDER — LISINOPRIL 40 MG/1
40 TABLET ORAL DAILY
Status: ON HOLD | COMMUNITY
End: 2020-04-30

## 2020-04-25 RX ORDER — LIDOCAINE 40 MG/G
CREAM TOPICAL
Status: DISCONTINUED | OUTPATIENT
Start: 2020-04-25 | End: 2020-04-30 | Stop reason: HOSPADM

## 2020-04-25 RX ORDER — TIOTROPIUM BROMIDE 18 UG/1
18 CAPSULE ORAL; RESPIRATORY (INHALATION) DAILY
Status: DISCONTINUED | OUTPATIENT
Start: 2020-04-25 | End: 2020-04-25 | Stop reason: CLARIF

## 2020-04-25 RX ORDER — NICOTINE POLACRILEX 4 MG
15-30 LOZENGE BUCCAL
Status: DISCONTINUED | OUTPATIENT
Start: 2020-04-25 | End: 2020-04-30 | Stop reason: HOSPADM

## 2020-04-25 RX ORDER — CLONIDINE HYDROCHLORIDE 0.1 MG/1
0.3 TABLET ORAL 2 TIMES DAILY
Status: DISCONTINUED | OUTPATIENT
Start: 2020-04-25 | End: 2020-04-30 | Stop reason: HOSPADM

## 2020-04-25 RX ORDER — HYDRALAZINE HYDROCHLORIDE 20 MG/ML
10 INJECTION INTRAMUSCULAR; INTRAVENOUS EVERY 4 HOURS PRN
Status: DISCONTINUED | OUTPATIENT
Start: 2020-04-25 | End: 2020-04-30 | Stop reason: HOSPADM

## 2020-04-25 RX ORDER — BISACODYL 10 MG
10 SUPPOSITORY, RECTAL RECTAL DAILY PRN
Status: DISCONTINUED | OUTPATIENT
Start: 2020-04-25 | End: 2020-04-30 | Stop reason: HOSPADM

## 2020-04-25 RX ORDER — PROCHLORPERAZINE 25 MG
12.5 SUPPOSITORY, RECTAL RECTAL EVERY 12 HOURS PRN
Status: DISCONTINUED | OUTPATIENT
Start: 2020-04-25 | End: 2020-04-30 | Stop reason: HOSPADM

## 2020-04-25 RX ORDER — AMIODARONE HYDROCHLORIDE 200 MG/1
200 TABLET ORAL DAILY
Status: ON HOLD | COMMUNITY
End: 2020-05-14

## 2020-04-25 RX ORDER — DEXTROSE MONOHYDRATE 25 G/50ML
25-50 INJECTION, SOLUTION INTRAVENOUS
Status: DISCONTINUED | OUTPATIENT
Start: 2020-04-25 | End: 2020-04-30 | Stop reason: HOSPADM

## 2020-04-25 RX ORDER — SODIUM CHLORIDE 9 MG/ML
INJECTION, SOLUTION INTRAVENOUS CONTINUOUS
Status: DISCONTINUED | OUTPATIENT
Start: 2020-04-25 | End: 2020-04-28

## 2020-04-25 RX ORDER — AMOXICILLIN 250 MG
2 CAPSULE ORAL 2 TIMES DAILY PRN
Status: DISCONTINUED | OUTPATIENT
Start: 2020-04-25 | End: 2020-04-30 | Stop reason: HOSPADM

## 2020-04-25 RX ORDER — VERAPAMIL HYDROCHLORIDE 240 MG/1
240 TABLET, FILM COATED, EXTENDED RELEASE ORAL AT BEDTIME
Status: DISCONTINUED | OUTPATIENT
Start: 2020-04-25 | End: 2020-04-26

## 2020-04-25 RX ORDER — PROCHLORPERAZINE MALEATE 5 MG
5 TABLET ORAL EVERY 6 HOURS PRN
Status: DISCONTINUED | OUTPATIENT
Start: 2020-04-25 | End: 2020-04-30 | Stop reason: HOSPADM

## 2020-04-25 RX ORDER — ONDANSETRON 2 MG/ML
4 INJECTION INTRAMUSCULAR; INTRAVENOUS ONCE
Status: COMPLETED | OUTPATIENT
Start: 2020-04-25 | End: 2020-04-25

## 2020-04-25 RX ORDER — ONDANSETRON 4 MG/1
4 TABLET, ORALLY DISINTEGRATING ORAL EVERY 6 HOURS PRN
Status: DISCONTINUED | OUTPATIENT
Start: 2020-04-25 | End: 2020-04-30 | Stop reason: HOSPADM

## 2020-04-25 RX ORDER — AMIODARONE HYDROCHLORIDE 200 MG/1
200 TABLET ORAL DAILY
Status: DISCONTINUED | OUTPATIENT
Start: 2020-04-25 | End: 2020-04-30 | Stop reason: HOSPADM

## 2020-04-25 RX ORDER — ACETAMINOPHEN 325 MG/1
650 TABLET ORAL EVERY 4 HOURS PRN
Status: DISCONTINUED | OUTPATIENT
Start: 2020-04-25 | End: 2020-04-30 | Stop reason: HOSPADM

## 2020-04-25 RX ORDER — NITROGLYCERIN 0.4 MG/1
0.4 TABLET SUBLINGUAL EVERY 5 MIN PRN
Status: DISCONTINUED | OUTPATIENT
Start: 2020-04-25 | End: 2020-04-30 | Stop reason: HOSPADM

## 2020-04-25 RX ORDER — AMOXICILLIN 250 MG
1 CAPSULE ORAL 2 TIMES DAILY PRN
Status: DISCONTINUED | OUTPATIENT
Start: 2020-04-25 | End: 2020-04-30 | Stop reason: HOSPADM

## 2020-04-25 RX ORDER — ACETAMINOPHEN 650 MG/1
650 SUPPOSITORY RECTAL EVERY 4 HOURS PRN
Status: DISCONTINUED | OUTPATIENT
Start: 2020-04-25 | End: 2020-04-30 | Stop reason: HOSPADM

## 2020-04-25 RX ORDER — LIDOCAINE 40 MG/G
CREAM TOPICAL
Status: DISCONTINUED | OUTPATIENT
Start: 2020-04-25 | End: 2020-04-25

## 2020-04-25 RX ORDER — HYDROCODONE BITARTRATE AND ACETAMINOPHEN 5; 325 MG/1; MG/1
1-2 TABLET ORAL EVERY 4 HOURS PRN
Status: DISCONTINUED | OUTPATIENT
Start: 2020-04-25 | End: 2020-04-30 | Stop reason: HOSPADM

## 2020-04-25 RX ADMIN — SODIUM CHLORIDE 500 ML: 9 INJECTION, SOLUTION INTRAVENOUS at 13:01

## 2020-04-25 RX ADMIN — INSULIN GLARGINE 6 UNITS: 100 INJECTION, SOLUTION SUBCUTANEOUS at 22:07

## 2020-04-25 RX ADMIN — CLONIDINE HYDROCHLORIDE 0.3 MG: 0.1 TABLET ORAL at 20:23

## 2020-04-25 RX ADMIN — Medication 1 MG: at 22:31

## 2020-04-25 RX ADMIN — AMIODARONE HYDROCHLORIDE 200 MG: 200 TABLET ORAL at 18:04

## 2020-04-25 RX ADMIN — SODIUM CHLORIDE: 9 INJECTION, SOLUTION INTRAVENOUS at 15:51

## 2020-04-25 RX ADMIN — VERAPAMIL HYDROCHLORIDE 240 MG: 240 TABLET, FILM COATED, EXTENDED RELEASE ORAL at 22:08

## 2020-04-25 RX ADMIN — SODIUM CHLORIDE 500 ML: 9 INJECTION, SOLUTION INTRAVENOUS at 13:05

## 2020-04-25 RX ADMIN — ONDANSETRON 4 MG: 2 INJECTION INTRAMUSCULAR; INTRAVENOUS at 13:01

## 2020-04-25 RX ADMIN — APIXABAN 2.5 MG: 2.5 TABLET, FILM COATED ORAL at 20:23

## 2020-04-25 ASSESSMENT — MIFFLIN-ST. JEOR
SCORE: 1398.46
SCORE: 1397.38

## 2020-04-25 ASSESSMENT — ACTIVITIES OF DAILY LIVING (ADL)
ADLS_ACUITY_SCORE: 13
ADLS_ACUITY_SCORE: 13

## 2020-04-25 NOTE — PLAN OF CARE
DATE & TIME: 4/25/2020 1510 to 1930    Cognitive Concerns/ Orientation : A&O x4   BEHAVIOR & AGGRESSION TOOL COLOR: green   CIWA SCORE: na    ABNL VS/O2: vss, on RA  MOBILITY: IND  PAIN MANAGMENT: denied pain.   DIET: 2 gm Na   BOWEL/BLADDER: diarrhea, continent of B&B  ABNL LAB/BG: BG 68/64 upon admission, had 240 ml of OJ, BG 82, MD aware, monitor. Cr. 3.06, on IVF.   DRAIN/DEVICES: PIV on right arm, infusing.   TELEMETRY RHYTHM: Afib with CVR  SKIN: intact   TESTS/PROCEDURES: stool and urine sample sent    D/C DAY/GOALS/PLACE: pending   OTHER IMPORTANT INFO: na

## 2020-04-25 NOTE — PROVIDER NOTIFICATION
MD Notification    Notified Person: MD    Notified Person Name: christiane     Notification Date/Time: 4/25/2020 9389    Notification Interaction: web page     Purpose of Notification:BG 68 and 64        Orders Received: none.     Comments:pt treated per hypoglycemic protocol

## 2020-04-25 NOTE — ED PROVIDER NOTES
"  History     Chief Complaint:  Diarrhea      The history is provided by the patient.      Tc Garcia is a 80 year old male with a history of atrial fibrillation, diabetes, hyperlipidemia, and pulmonary embolism on Eliquis who presents with diarrhea. Per chart review, the patient was diagnosed with a pleural effusion on /1/24/20.  Over the past 3 to 4 days he has felt nauseous and had periodic vomiting with poor oral intake.  This morning he developed diarrhea after having a normal bowel movement.  He is feeling generally fatigued and weak.  Has had a mild cough which she associates with his pleural effusions that have been drained multiple times over the past several months.  He is unsure of the cause of his pleural effusions.  He does have atrial fibrillation and is on Eliquis.  He denies any recent falls or right-sided chest or back pain.    Allergies:  No known allergies    Medications:    Amiodarone  Eliquis  Clonidine  Glucagon  Lovastatin  Metformin  Actos  Torsemide  Verelan    Past Medical History:    Atrial fibrillation   Diabetes   Hyperlipidemia   Colonic polyps   Rosacea  Hypertension   Pulmonary embolism  Hemothorax     Past Surgical History:    Cardioversion  Tonsillectomy     Family History:    No past pertinent family history.    Social History:  Smoking Status: Former smoker   Smokeless Tobacco: Never Used  Alcohol Use: Yes  Drug Use: No  PCP: Senthil Moya  Marital Status:   [2]     Review of Systems   All other systems reviewed and are negative.      Physical Exam     Patient Vitals for the past 24 hrs:   BP Temp Temp src Pulse Heart Rate Resp SpO2 Height Weight   04/25/20 1230 131/78 -- -- 58 -- -- 99 % -- --   04/25/20 1224 (!) 144/82 98.4  F (36.9  C) Oral -- 63 18 100 % 1.753 m (5' 9\") 70.3 kg (155 lb)       Physical Exam  Eyes:  The pupils are equal and round    Conjunctivae and sclerae are normal  ENT:    The nose is normal    Pinnae are normal    The oropharynx is " normal  Neck:  Normal range of motion    There is no rigidity noted  CV:  Irregularly irregular    No edema  Resp:  Normal respirtory effort    Diminished sounds at left base  GI:  Abdomen is soft and non-tender, there is no rigidity  MS:  Normal muscular tone    No asymmetric leg swelling  Skin:  No rash or acute skin lesions noted  Neuro:   Awake, alert.      Speech is normal and fluent.    Face is symmetric.     Moves all extremities    Emergency Department Course   ECG:  ECG taken at 1301, ECG read at 1307  Atrial fibrillation   Nonspecific T wave abnormality   Abnormal ECG  Rate 68 bpm. CT interval * ms. QRS duration 104 ms. QT/QTc 482/512 ms. P-R-T axes * 34 -79.    Imaging:  Radiology findings were communicated with the patient who voiced understanding of the findings.    XR Chest Port 1 View  Moderately displaced posterior right seventh rib fracture  deformity. No pneumothorax. Moderate left pleural effusion. Left  perihilar and basilar consolidation indeterminate between compressive  atelectasis or pneumonitis. There are multiple left rib fracture  Deformities.  Reading per radiology    Laboratory:  Laboratory findings were communicated with the patient who voiced understanding of the findings.    Lactic acid: 2.0  CBC: WBC 10.5, HGB 10.3 (L),   CMP: BUN 53 (H), GFR 18 (L), albumin 3.0 (L), protein total 6.3 (L), glucose 112 (H) o/w WNL (Creatinine 3.06 (H))    UA with microscopic: pending    COVID-19: pending     Interventions:  1301 Zofran 4 mg IV   NS bolus 500 ml IV  1305 NS bolus 500 ml IV    Emergency Department Course:  Nursing notes and vitals reviewed.    1224 I performed an exam of the patient as documented above.     The patient was sent for a chest XR while in the emergency department, results above.     IV was inserted and blood was drawn for laboratory testing, results above.    The patient provided a urine sample here in the emergency department. This was sent for laboratory testing,  findings above.    1410  I spoke with Dr. Whitten of the Hospitalist service from [facility] regarding patient's presentation, findings, and plan of care.    Findings and plan explained to the Patient who consents to admission. Discussed the patient with Dr. Whitten, who will admit the patient to a med/surg bed for further monitoring, evaluation, and treatment.    Impression & Plan      Covid-19  Tc Garcia was evaluated during a global COVID-19 pandemic, which necessitated consideration that the patient might be at risk for infection with the SARS-CoV-2 virus that causes COVID-19.   Applicable protocols for evaluation were followed during the patient's care.    Medical Decision Making:  Tc Garcia is a 80 year old male who presents to the emergency department today for evaluation of weakness and suspected dehydration.  Labs indicate acute renal failure likely secondary to dehydration.    He does have diarrhea that started today.  Given this coronavirus testing was ordered for admission.  No abdominal tenderness to suggest a surgical abdominal pathology.  It was noted that he had a right rib fracture which have not seen noted on prior x-rays.  He reports that he not had any falls or injuries and denies any pain in the location.  He was given IV fluids here in the emergency department.  He is discussed with the hospitalist agreed to accept him as an admission.    Diagnosis:    ICD-10-CM    1. Dehydration  E86.0    2. Acute renal failure, unspecified acute renal failure type (H)  N17.9        Disposition:   The patient is admitted into the care of Dr. Whitten.    Discharge Medications:    New Prescriptions    No medications on file       Scribe Disclosure:  Delores HALEY, am serving as a scribe at 12:29 PM on 4/25/2020 to document services personally performed by El Ramirez MD based on my observations and the provider's statements to me.      EMERGENCY DEPARTMENT       El Ramirez  MD Shaun  04/25/20 8290

## 2020-04-25 NOTE — ED NOTES
"[facility]  ED Nurse Handoff Report    ED Chief complaint: Diarrhea      ED Diagnosis:   Final diagnoses:   None       Code Status: Full Code    Allergies:   Allergies   Allergen Reactions     No Known Drug Allergies        Patient Story: pt come from home after his children advised him too.   Pt has 1 emesis yesterday and 5 loose stools this a.m. Pt denies abdominal pain. Pt has had multiple lung taps since Jan. And SOB is normal for him  Focused Assessment:  Pt appears to be dehydrated, his Cr has more than doubled since his last visit. He doesn't seem to be SOB with rest and doesn't wear O2. He is a very pleasant man and has been using his phone to contact his family     Treatments and/or interventions provided: IV fluid   Patient's response to treatments and/or interventions:     To be done/followed up on inpatient unit:  monitor labs     Does this patient have any cognitive concerns?: alert    Activity level - Baseline/Home:  Independent  Activity Level - Current:   Stand with Assist    Patient's Preferred language: English   Needed?: No    Isolation:Rule out covid   Infection:   Bariatric?: No    Vital Signs:   Vitals:    04/25/20 1224 04/25/20 1230   BP: (!) 144/82 131/78   Pulse:  58   Resp: 18    Temp: 98.4  F (36.9  C)    TempSrc: Oral    SpO2: 100% 99%   Weight: 70.3 kg (155 lb)    Height: 1.753 m (5' 9\")        Cardiac Rhythm:     Was the PSS-3 completed:   Yes  What interventions are required if any?               Family Comments:   OBS brochure/video discussed/provided to patient/family: N/A              Name of person given brochure if not patient:               Relationship to patient:     For the majority of the shift this patient's behavior was Green.   Behavioral interventions performed were .    ED NURSE PHONE NUMBER: *[phone]         "

## 2020-04-25 NOTE — ED NOTES
Bed: ED02  Expected date:   Expected time:   Means of arrival:   Comments:  514  80 M diarrhea/nausea  1211

## 2020-04-25 NOTE — PHARMACY-ADMISSION MEDICATION HISTORY
Pharmacy Medication History  Admission medication history interview status for the 4/25/2020  admission is complete. See EPIC admission navigator for prior to admission medications     Medication history sources: Patient, Surescripts and Care Everywhere  Medication history source reliability: Good  Adherence assessment: Good    Significant changes made to the medication list:  Changed amiodarone from twice daily to once daily per patient  Added lisinopril and spiriva    Additional medication history information:   Recent antimicrobials:  On 3/30, patient was prescribed cefprozil 250 mg BID x 10 days. Patient completed the course of antibiotics.     Patient has insulin pump - was told to shut it off here but he reports settings from Dec 2019 admission there were no changes.     Apparently he had been filling losartan/hctz and lisinopril at the same time. Patient reports he was told to stop losartan/hctz and continue lisinopril.     Medication reconciliation completed by provider prior to medication history? No    Time spent in this activity: 20 minutes       Prior to Admission medications    Medication Sig Last Dose Taking? Auth Provider   amiodarone (PACERONE) 200 MG tablet Take 200 mg by mouth daily 4/24/2020 at Unknown time Yes Unknown, Entered By History   apixaban ANTICOAGULANT (ELIQUIS) 5 MG tablet Take 1 tablet (5 mg) by mouth 2 times daily 4/25/2020 at am Yes Jas Jaramillo MD   cloNIDine (CATAPRES) 0.3 MG tablet Take 0.3 mg by mouth 2 times daily 4/24/2020 at Unknown time Yes Unknown, Entered By History   glucagon 1 MG kit 1 mg as needed for low blood sugar prn at prn Yes Unknown, Entered By History   INSULIN PUMP - OUTPATIENT Novolog Insulin  BASAL RATES and times:  Midnight to 6am: 0.65 units/hr  6am to 10:30 pm: 0.95 units/hour    10:30pm to midnight: 0.55 units/hr  CARB RATIO: 1 unit per 15 grams  Correction Factor (Sensitivity): 55mg/dL  BLOOD GLUCOSE TARGET and times:  12   AM (midnight): 100 -  140  5:30     AM:  100 - 120  10   PM:  100 - 140  Active Insulin Time:  5 hours   Bolus-Correction 1 unit(s) to lower blood glucose by 55 mg/dL  Checks insulin about 4-5 times a day manually  (Per Rx, pt uses 50-60 units/day) 4/25/2020 at running today Yes Unknown, Entered By History   lisinopril (ZESTRIL) 40 MG tablet Take 40 mg by mouth daily 4/24/2020 at Unknown time Yes Unknown, Entered By History   LOVASTATIN 20 MG PO TABS 1 TABLET DAILY AT DINNER 4/24/2020 at Unknown time Yes Tc Palacios MD   metFORMIN (GLUCOPHAGE) 500 MG tablet Take 500 mg by mouth 2 times daily (with meals) 4/24/2020 at Unknown time Yes Unknown, Entered By History   pioglitazone (ACTOS) 30 MG tablet Take 30 mg by mouth daily (with breakfast)  4/24/2020 at Unknown time Yes Unknown, Entered By History   tiotropium (SPIRIVA) 18 MCG inhaled capsule Inhale 18 mcg into the lungs daily 4/24/2020 at Unknown time Yes Unknown, Entered By History   triamcinolone (KENALOG) 0.1 % external ointment Apply topically daily as needed for irritation prn at prn Yes Unknown, Entered By History   verapamil ER (VERELAN) 240 MG 24 hr capsule Take 1 capsule (240 mg) by mouth At Bedtime 4/24/2020 at Unknown time Yes Quinton Camejo MD

## 2020-04-25 NOTE — H&P
Admitted:     04/25/2020      PRIMARY CARE PHYSICIAN:  Dr. Senthil Moya at [facility].      CODE STATUS:  DNR/DNI.      CHIEF COMPLAINT:  Diarrhea and feeling unwell.      HISTORY OF PRESENT ILLNESS:  Mr. Garcia is an 80-year-old male with a past medical history significant for atrial fibrillation, diabetes, hypertension and recurrent left pleural effusion who presents to the Emergency Department today with the above concern.  History is obtained through discussion with the ED physician as well as the patient.  I did meet the patient through the video iPad system given his POI status.  The patient notes that he has actually been feeling somewhat unwell dating back at least 3-4 weeks which he describes as feeling a bit weak with occasional nausea and just having very little appetite.  The patient states that today he started having some diarrhea and feels more fatigued, which is why he came in to be evaluated.  He denies any fevers or chills.  No abdominal pain or cramping.  He has had a couple of loose stools today but they have not been complete water.  He typically tends towards constipation and will have a bowel movement every 4-5 days normally, which then loosens up a little bit when he started taking MiraLax.  He has not been taking MiraLax regularly or any other new medications.      The patient does have a history of recurrent left-sided pleural effusions which historically, at least recently, have been tapped every 3-4 weeks.  The patient had a last thoracentesis on 04/16 which is 9 days ago.  Typically, the patient will get increasingly short of breath, which is why he had a thoracentesis, though he notes this most recent time he was not particularly short of breath, but it was done because he was having these symptoms of fatigue, weakness and generally poor appetite.  After the thoracentesis was done, which removed 1.45 liters, he did not feel significantly better.  The patient does  follow with Cardiology for paroxysmal atrial fibrillation for which he is on apixaban and amiodarone as well as verapamil and these pleural effusions for which he is typically on torsemide.  The patient states that he had been on torsemide 40 mg daily, which was cut down to 20 mg daily and ultimately discontinued sometime in the past week or 2, though he is not exactly clear why.  He was not told, to his recollection, that he was having any issues with his renal function.  No other new medications.      Right now, the patient feels relatively well, just very weak.  He does tell me that he has not had any recent traveling or sick exposures that he is aware of.  He lives with his wife who is very healthy.  He does tell me that his taste is off, like he cannot tasted well, which is not typical for him.      In the Emergency Department, workup was revealing for acute kidney injury with a creatinine of 3.06 and a BUN of 53.  Chest x-ray showed a right 7th rib fracture and a moderate left-sided pleural effusion.  The patient specifically says he has not fallen recently, nor does he have any right-sided rib or chest discomfort.  The patient has been initiated on some IV fluids with admission requested because of the acute kidney injury.  Of note, the patient does have an insulin pump and was hypoglycemic, down to 59 today, so we just turned off his pump.  I did ask him to completely shut it off and not restart it for now, given his renal failure and the hypoglycemia.      PAST MEDICAL HISTORY:   1.  Recurrent left-sided pleural effusions:  He typically gets these tapped every 3-4 weeks.   2.  Paroxysmal atrial fibrillation.   3.  Diabetes, on insulin.   4.  Hypertension.   5.  Dyslipidemia.      MEDICATIONS:    Medications Prior to Admission   Medication Sig Dispense Refill Last Dose     amiodarone (PACERONE) 200 MG tablet Take 200 mg by mouth daily   4/24/2020 at Unknown time     apixaban ANTICOAGULANT (ELIQUIS) 5 MG  tablet Take 1 tablet (5 mg) by mouth 2 times daily   4/25/2020 at am     cloNIDine (CATAPRES) 0.3 MG tablet Take 0.3 mg by mouth 2 times daily   4/24/2020 at Unknown time     glucagon 1 MG kit 1 mg as needed for low blood sugar   prn at prn     INSULIN PUMP - OUTPATIENT Novolog Insulin  BASAL RATES and times:  Midnight to 6am: 0.65 units/hr  6am to 10:30 pm: 0.95 units/hour    10:30pm to midnight: 0.55 units/hr  CARB RATIO: 1 unit per 15 grams  Correction Factor (Sensitivity): 55mg/dL  BLOOD GLUCOSE TARGET and times:  12   AM (midnight): 100 - 140  5:30     AM:  100 - 120  10   PM:  100 - 140  Active Insulin Time:  5 hours   Bolus-Correction 1 unit(s) to lower blood glucose by 55 mg/dL  Checks insulin about 4-5 times a day manually  (Per Rx, pt uses 50-60 units/day)   4/25/2020 at running today     lisinopril (ZESTRIL) 40 MG tablet Take 40 mg by mouth daily   4/24/2020 at Unknown time     LOVASTATIN 20 MG PO TABS 1 TABLET DAILY AT DINNER 90 Tab 0 4/24/2020 at Unknown time     metFORMIN (GLUCOPHAGE) 500 MG tablet Take 500 mg by mouth 2 times daily (with meals)   4/24/2020 at Unknown time     pioglitazone (ACTOS) 30 MG tablet Take 30 mg by mouth daily (with breakfast)    4/24/2020 at Unknown time     tiotropium (SPIRIVA) 18 MCG inhaled capsule Inhale 18 mcg into the lungs daily   4/24/2020 at Unknown time     triamcinolone (KENALOG) 0.1 % external ointment Apply topically daily as needed for irritation   prn at prn     verapamil ER (VERELAN) 240 MG 24 hr capsule Take 1 capsule (240 mg) by mouth At Bedtime   4/24/2020 at Unknown time           SOCIAL HISTORY:  The patient lives with his wife.  Denies any use of tobacco or alcohol.      FAMILY HISTORY:  Father had a stroke and mother had congestive heart failure.      ALLERGIES:  NO KNOWN DRUG ALLERGIES.      REVIEW OF SYSTEMS:  The complete review of systems reviewed and negative, save for the pertinent positives recorded in the HPI.      PHYSICAL EXAMINATION:   VITAL  SIGNS:  Show blood pressure of 118/63, heart rate 61, respirations 16, satting 100% on room air, temperature 97.9 degrees Fahrenheit.   GENERAL:  The patient is lying in bed with a mask on and appears comfortable.  I am using the exam of Dr. Ramirez in the ED so please refer to his exam.  I specifically note that his lungs sounded diminished at the left base, his heart was irregular and there was no edema.        LABORATORIES:  WBC count 10.5, hemoglobin 10.3 and platelets of 383.  Sodium 138, potassium 4.1, chloride 102.  The BUN 26, creatinine is 3.06 with unremarkable LFTs.  Hemoglobin A1c is 8.2.  Lactic acid is 2.  Chest x-ray shows a right 7th rib fracture as above and a moderate left pleural effusion.  EKG shows atrial fibrillation. Abdominal exam was soft and non tender.       ASSESSMENT AND PLAN:  Mr. Garcia is an 80-year-old male with a past medical history significant for atrial fibrillation, diabetes, hypertension and recurrent left pleural effusions, who presents to the Emergency Department with 3-4 weeks of generally feeling unwell with diarrhea today and found to have acute kidney injury.   1.  Acute kidney injury:  Creatinine is 3.06 and baseline appears to be normal at about 1.  He tells me he is no longer on torsemide, but has had poor p.o. intake, so this may be all due to dehydration.  If he has not been on diuretic after pharmacy reconciliation, I will plan to obtain a FeNa and will hydrate gently with normal saline.  We need to be cautious with the amount of fluid we give him given his pleural effusions.  I will note that he had a preserved EF of 60%, but did have a severe left atrial dilation, decreased RV function on echocardiogram in 10/2019.  We will repeat a BMP in the morning.  If he is on a diuretic or any other medication that could increase his creatinine, will plan to hold.   2.  Poor p.o. intake, diarrhea and feeling unwell:  This certainly could be related to his renal failure and the  uremia, though with his loss of taste, new diarrhea and current global pandemic, I do think it prudent to rule out COVID-19, which is being done.  Continue to treat renal failure as above.   3.  Diabetes:  I am going to hold his insulin pump for now given the hypoglycemia and his renal failure.  We will also plan to hold metformin and Actos.  I am going to give him roughly a third of his typical basal insulin with Lantus 6 units this evening and hold any prandial coverage, but will have a low dose sliding scale as needed.  If his p.o. intake improves and his renal function improves, could consider restarting his insulin pump in the next several days.   4.  Paroxysmal atrial fibrillation:  Heart rate is currently controlled.  For now, we will plan to continue with amiodarone and verapamil.  I did consider amiodarone as a possible etiology for his symptoms above which still needs to be considered.  I will continue with apixaban for now, but if he starts getting any short of breath, I would likely hold that just in case we need to do another thoracentesis.   5.  Hypertension:  We will continue with his PTA regimen once confirmed.   6.  Anemia:  Hemoglobin is 10.3 and most recent was 12.5 though historically he appears to be right around 10.  Will repeat a hemoglobin in the morning but note he does not have any evidence of bleeding at this point.   7.  Deep venous thrombosis prophylaxis:  He is on apixaban.   8.  Disposition:  I anticipate at least a couple nights in the hospital given the acute kidney injury so will be an inpatient.         ROSA YAN DO             D: 2020   T: 2020   MT: NTS      Name:     CEDRICK PHILLIPS   MRN:      [id number]        Account:      [id number]   :      1940        Admitted:     2020                   Document: [id number]       cc: Senthil Moya MD

## 2020-04-26 LAB
ANION GAP SERPL CALCULATED.3IONS-SCNC: 5 MMOL/L (ref 3–14)
BUN SERPL-MCNC: 48 MG/DL (ref 7–30)
C COLI+JEJUNI+LARI FUSA STL QL NAA+PROBE: NOT DETECTED
CALCIUM SERPL-MCNC: 8.1 MG/DL (ref 8.5–10.1)
CHLORIDE SERPL-SCNC: 106 MMOL/L (ref 94–109)
CO2 SERPL-SCNC: 26 MMOL/L (ref 20–32)
CREAT SERPL-MCNC: 2.73 MG/DL (ref 0.66–1.25)
EC STX1 GENE STL QL NAA+PROBE: NOT DETECTED
EC STX2 GENE STL QL NAA+PROBE: NOT DETECTED
ENTERIC PATHOGEN COMMENT: NORMAL
ERYTHROCYTE [DISTWIDTH] IN BLOOD BY AUTOMATED COUNT: 19.4 % (ref 10–15)
GFR SERPL CREATININE-BSD FRML MDRD: 21 ML/MIN/{1.73_M2}
GLUCOSE BLDC GLUCOMTR-MCNC: 252 MG/DL (ref 70–99)
GLUCOSE BLDC GLUCOMTR-MCNC: 289 MG/DL (ref 70–99)
GLUCOSE BLDC GLUCOMTR-MCNC: 292 MG/DL (ref 70–99)
GLUCOSE BLDC GLUCOMTR-MCNC: 347 MG/DL (ref 70–99)
GLUCOSE SERPL-MCNC: 288 MG/DL (ref 70–99)
HCT VFR BLD AUTO: 26.3 % (ref 40–53)
HGB BLD-MCNC: 8.4 G/DL (ref 13.3–17.7)
MCH RBC QN AUTO: 27.1 PG (ref 26.5–33)
MCHC RBC AUTO-ENTMCNC: 31.9 G/DL (ref 31.5–36.5)
MCV RBC AUTO: 85 FL (ref 78–100)
NOROV GI+II ORF1-ORF2 JNC STL QL NAA+PR: NOT DETECTED
PLATELET # BLD AUTO: 333 10E9/L (ref 150–450)
POTASSIUM SERPL-SCNC: 4 MMOL/L (ref 3.4–5.3)
RBC # BLD AUTO: 3.1 10E12/L (ref 4.4–5.9)
RVA NSP5 STL QL NAA+PROBE: NOT DETECTED
SALMONELLA SP RPOD STL QL NAA+PROBE: NOT DETECTED
SHIGELLA SP+EIEC IPAH STL QL NAA+PROBE: NOT DETECTED
SODIUM SERPL-SCNC: 137 MMOL/L (ref 133–144)
V CHOL+PARA RFBL+TRKH+TNAA STL QL NAA+PR: NOT DETECTED
WBC # BLD AUTO: 8.4 10E9/L (ref 4–11)
Y ENTERO RECN STL QL NAA+PROBE: NOT DETECTED

## 2020-04-26 PROCEDURE — 00000146 ZZHCL STATISTIC GLUCOSE BY METER IP

## 2020-04-26 PROCEDURE — 12000000 ZZH R&B MED SURG/OB

## 2020-04-26 PROCEDURE — 80048 BASIC METABOLIC PNL TOTAL CA: CPT | Performed by: INTERNAL MEDICINE

## 2020-04-26 PROCEDURE — 85027 COMPLETE CBC AUTOMATED: CPT | Performed by: INTERNAL MEDICINE

## 2020-04-26 PROCEDURE — 25000132 ZZH RX MED GY IP 250 OP 250 PS 637: Performed by: INTERNAL MEDICINE

## 2020-04-26 PROCEDURE — 25800030 ZZH RX IP 258 OP 636: Performed by: INTERNAL MEDICINE

## 2020-04-26 PROCEDURE — 25000131 ZZH RX MED GY IP 250 OP 636 PS 637: Performed by: INTERNAL MEDICINE

## 2020-04-26 PROCEDURE — 36415 COLL VENOUS BLD VENIPUNCTURE: CPT | Performed by: INTERNAL MEDICINE

## 2020-04-26 PROCEDURE — 99232 SBSQ HOSP IP/OBS MODERATE 35: CPT | Performed by: INTERNAL MEDICINE

## 2020-04-26 RX ORDER — VERAPAMIL HYDROCHLORIDE 180 MG/1
180 TABLET, EXTENDED RELEASE ORAL AT BEDTIME
Status: DISCONTINUED | OUTPATIENT
Start: 2020-04-26 | End: 2020-04-30

## 2020-04-26 RX ADMIN — AMIODARONE HYDROCHLORIDE 200 MG: 200 TABLET ORAL at 08:13

## 2020-04-26 RX ADMIN — SODIUM CHLORIDE: 9 INJECTION, SOLUTION INTRAVENOUS at 11:08

## 2020-04-26 RX ADMIN — INSULIN ASPART 2 UNITS: 100 INJECTION, SOLUTION INTRAVENOUS; SUBCUTANEOUS at 17:21

## 2020-04-26 RX ADMIN — CLONIDINE HYDROCHLORIDE 0.3 MG: 0.1 TABLET ORAL at 08:13

## 2020-04-26 RX ADMIN — SODIUM CHLORIDE: 9 INJECTION, SOLUTION INTRAVENOUS at 01:28

## 2020-04-26 RX ADMIN — APIXABAN 2.5 MG: 2.5 TABLET, FILM COATED ORAL at 21:10

## 2020-04-26 RX ADMIN — VERAPAMIL HYDROCHLORIDE 180 MG: 180 TABLET, FILM COATED, EXTENDED RELEASE ORAL at 21:10

## 2020-04-26 RX ADMIN — CLONIDINE HYDROCHLORIDE 0.3 MG: 0.1 TABLET ORAL at 21:10

## 2020-04-26 RX ADMIN — SODIUM CHLORIDE: 9 INJECTION, SOLUTION INTRAVENOUS at 21:21

## 2020-04-26 RX ADMIN — INSULIN ASPART 2 UNITS: 100 INJECTION, SOLUTION INTRAVENOUS; SUBCUTANEOUS at 08:13

## 2020-04-26 RX ADMIN — APIXABAN 2.5 MG: 2.5 TABLET, FILM COATED ORAL at 08:13

## 2020-04-26 RX ADMIN — INSULIN GLARGINE 12 UNITS: 100 INJECTION, SOLUTION SUBCUTANEOUS at 21:14

## 2020-04-26 RX ADMIN — INSULIN ASPART 3 UNITS: 100 INJECTION, SOLUTION INTRAVENOUS; SUBCUTANEOUS at 13:37

## 2020-04-26 RX ADMIN — Medication 1 LOZENGE: at 21:10

## 2020-04-26 RX ADMIN — UMECLIDINIUM 1 PUFF: 62.5 AEROSOL, POWDER ORAL at 20:03

## 2020-04-26 ASSESSMENT — ACTIVITIES OF DAILY LIVING (ADL)
ADLS_ACUITY_SCORE: 15
ADLS_ACUITY_SCORE: 13

## 2020-04-26 NOTE — PROVIDER NOTIFICATION
MD Notification    Notified Person: MD    Notified Person Name:  Norris    Notification Date/Time: 4/25 2150    Notification Interaction: Web page    Purpose of Notification: Covid negative, keep precautions in place?    Orders Received: Pt is low risk, remove precautions. (leave enteric in place)    Comments:

## 2020-04-26 NOTE — PLAN OF CARE
Problem: Adult Inpatient Plan of Care  Goal: Plan of Care Review  4/26/2020 1646 by Sasha Vera RN  Outcome: Improving  4/26/2020 1118 by Molina Galindo RN  Outcome: Improving  4/26/2020 0604 by El Elizabeth RN  Outcome: Improving     Problem: Diarrhea  Goal: Fluid and Electrolyte Balance  4/26/2020 1646 by Sasha Vera RN  Outcome: Improving  4/26/2020 1118 by Molina Galindo RN  Outcome: Improving  4/26/2020 0604 by El Elizabeth RN  Outcome: Improving     No Bowel movements so far today. Fluids running. Creatine slowly decreasing. Slight edema in lower extremities and lung sounds diminished. Fluids decreased. Denying pain. Up with stand by due to low HR intermittently today. Amio on hold. Verapamil decreased per MD.

## 2020-04-26 NOTE — PLAN OF CARE
"Rested well overnight. State's he's \"feeling better\". Denies nausea. Tolerating 2g Na diet. Enteric precautions remain for diarrhea. Covid precautions removed d/t negative test results. VSS. Room air. Voiding. Ambulating well independently. IVF infusing. A&O. Coccyx blanchable, pt repositioning self in bed. Currently off insulin pump due to hypoglycemia yesterday.   "

## 2020-04-26 NOTE — PROGRESS NOTES
[facility]    Medicine Progress Note - Hospitalist Service       Date of Admission:  4/25/2020  Assessment & Plan    Mr. Garcia is an 80-year-old male with a past medical history significant for atrial fibrillation, diabetes, hypertension and recurrent left pleural effusions, who presents to the Emergency Department with 3-4 weeks of generally feeling unwell with diarrhea today and found to have acute kidney injury.     Acute kidney injury    Creatinine is 3.06 and baseline appears to be normal at about 1.  He tells me he is no longer on torsemide, but has had poor p.o. intake, so this may be all due to dehydration    We need to be cautious with the amount of fluid we give him given his pleural effusions.      note that he had a preserved EF of 60%, but did have a severe left atrial dilation, decreased RV function on echocardiogram in 10/2019.  We will repeat a BMP daily    Poor p.o. intake, diarrhea and feeling unwell     COVID-19 is negative    Symptoms are improved with rehydration    Diabetes     insulin pump for now given the hypoglycemia and his renal failure    also plan to hold metformin and Actos    Patient was given roughly a third of his typical basal insulin with Lantus 6 units this evening and hold any prandial coverage, but will have a low dose sliding scale as needed.      Today's blood sugar readings are far above goal, increase basal insulin    Since p.o. intake and his renal function improved, could consider restarting his insulin pump in the next several days.    Check hemoglobin A1c    Paroxysmal atrial fibrillation    Now with mild, asymptomatic bradycardia    Hold amiodarone    Decrease verapamil to 180 mg daily    Considering risks versus benefits of anticoagulants versus thromboembolism, resume apixaban now.  If needs thoracentesis in future, can hold prior to procedure    Hypertension    Blood pressure readings are at goal    Continue clonidine, verapamil, see discussion  "above    Anemia    Hemoglobin is 10.3 and most recent was 12.5 though historically he appears to be right around 10    Patient has no signs or symptoms of bleeding    Recheck hemoglobin    Diet: Combination Diet 2 gm NA Diet    DVT Prophylaxis: Apixaban  Pruett Catheter: not present  Code Status: DNR/DNI      Disposition Plan   Expected discharge: 2 - 3 days, recommended to prior living arrangement once renal function improved.  Entered: Susu Rodriguez MD 04/26/2020, 8:37 AM       The patient's care was discussed with the Bedside Nurse.    Susu Rodriguez MD  Hospitalist Service  [facility]    ______________________________________________________________________    Interval History   \"I feel so much better.\"  Patient says he has not felt well for 2 or 3 weeks, he has not been able to eat anything.  Today, he feels his appetite has returned.  He denies nausea or vomiting.  He denies feeling short of breath at rest.    Data reviewed today: I reviewed all medications, new labs and imaging results over the last 24 hours. I personally reviewed the chest x-ray image(s) showing Moderate left pleural effusion..    Physical Exam   Vital Signs: Temp: 98.8  F (37.1  C) Temp src: Oral BP: 108/65 Pulse: 90 Heart Rate: 63 Resp: 18 SpO2: 97 % O2 Device: None (Room air)    Weight: 154 lbs 12.21 oz  Constitutional: Frail, chronically ill-appearing man.  Awake, alert, cooperative, no apparent distress  Respiratory: Decreased breath sounds on left  Cardiovascular: Irregularly irregular rhythm  GI: Normal bowel sounds, soft, non-distended, non-tender  Skin/Integumen: No rashes, no cyanosis, no edema  Other: Mood is upbeat      Data   Recent Labs   Lab 04/26/20  0701 04/25/20  1228   WBC 8.4 10.5   HGB 8.4* 10.3*   MCV 85 84    383    138   POTASSIUM 4.0 4.1   CHLORIDE 106 102   CO2 26 26   BUN 48* 53*   CR 2.73* 3.06*   ANIONGAP 5 10   LLOYD 8.1* 9.2   * 112*   ALBUMIN  --  3.0*   PROTTOTAL  --  " 6.3*   BILITOTAL  --  0.4   ALKPHOS  --  79   ALT  --  21   AST  --  21     No results found for this or any previous visit (from the past 24 hour(s)).

## 2020-04-26 NOTE — PROGRESS NOTES
Notified MD at 1112 regarding changes in vital signs.  HR 43-47    Spoke with: awaiting call back from [facility]    Orders were not obtained.    Comments: Awaiting call back    Orders in place now, medications adjusted.

## 2020-04-26 NOTE — PROGRESS NOTES
Cross Cover:  Notified of negative COVID-19 swab. Patient believed to be low risk for COVID-19 per admitting provider.    Remove special precautions/contact/droplet. Keep enteric precautions due to diarrhea.    ROSALES Pisano, DO

## 2020-04-26 NOTE — PLAN OF CARE
DATE & TIME:4/26/2020 7099-5946   Cognitive Concerns/ Orientation : A&O x4   BEHAVIOR & AGGRESSION TOOL COLOR: green   CIWA SCORE: na    ABNL VS/O2: vss, on RA  MOBILITY: IND  PAIN MANAGMENT: denied pain.   DIET: 2 gm Na, good appetite.   BOWEL/BLADDER: continent, denied diarrhea  ABNL LAB/BG: neg for COVID 19, enteric panel neg. Isolation lifted per MD.   DRAIN/DEVICES: PIV on right arm, infusing.   TELEMETRY RHYTHM: Afib with CVR  SKIN: intact  TESTS/PROCEDURES: na   D/C DAY/GOALS/PLACE: pending cr. Improvement.   OTHER IMPORTANT INFO:

## 2020-04-27 LAB
ANION GAP SERPL CALCULATED.3IONS-SCNC: 3 MMOL/L (ref 3–14)
BUN SERPL-MCNC: 37 MG/DL (ref 7–30)
CALCIUM SERPL-MCNC: 8.5 MG/DL (ref 8.5–10.1)
CHLORIDE SERPL-SCNC: 107 MMOL/L (ref 94–109)
CO2 SERPL-SCNC: 28 MMOL/L (ref 20–32)
CREAT SERPL-MCNC: 2.31 MG/DL (ref 0.66–1.25)
GFR SERPL CREATININE-BSD FRML MDRD: 26 ML/MIN/{1.73_M2}
GLUCOSE BLDC GLUCOMTR-MCNC: 164 MG/DL (ref 70–99)
GLUCOSE BLDC GLUCOMTR-MCNC: 220 MG/DL (ref 70–99)
GLUCOSE BLDC GLUCOMTR-MCNC: 243 MG/DL (ref 70–99)
GLUCOSE BLDC GLUCOMTR-MCNC: 326 MG/DL (ref 70–99)
GLUCOSE BLDC GLUCOMTR-MCNC: 346 MG/DL (ref 70–99)
GLUCOSE SERPL-MCNC: 158 MG/DL (ref 70–99)
HGB BLD-MCNC: 8.8 G/DL (ref 13.3–17.7)
POTASSIUM SERPL-SCNC: 4 MMOL/L (ref 3.4–5.3)
SODIUM SERPL-SCNC: 138 MMOL/L (ref 133–144)

## 2020-04-27 PROCEDURE — 25000132 ZZH RX MED GY IP 250 OP 250 PS 637: Performed by: INTERNAL MEDICINE

## 2020-04-27 PROCEDURE — 85018 HEMOGLOBIN: CPT | Performed by: INTERNAL MEDICINE

## 2020-04-27 PROCEDURE — 80048 BASIC METABOLIC PNL TOTAL CA: CPT | Performed by: INTERNAL MEDICINE

## 2020-04-27 PROCEDURE — 00000146 ZZHCL STATISTIC GLUCOSE BY METER IP

## 2020-04-27 PROCEDURE — 25000131 ZZH RX MED GY IP 250 OP 636 PS 637: Performed by: INTERNAL MEDICINE

## 2020-04-27 PROCEDURE — 99232 SBSQ HOSP IP/OBS MODERATE 35: CPT | Performed by: INTERNAL MEDICINE

## 2020-04-27 PROCEDURE — 36415 COLL VENOUS BLD VENIPUNCTURE: CPT | Performed by: INTERNAL MEDICINE

## 2020-04-27 PROCEDURE — 25800030 ZZH RX IP 258 OP 636: Performed by: INTERNAL MEDICINE

## 2020-04-27 PROCEDURE — 12000000 ZZH R&B MED SURG/OB

## 2020-04-27 PROCEDURE — 40000893 ZZH STATISTIC PT IP EVAL DEFER: Performed by: PHYSICAL THERAPIST

## 2020-04-27 RX ADMIN — CLONIDINE HYDROCHLORIDE 0.3 MG: 0.1 TABLET ORAL at 21:45

## 2020-04-27 RX ADMIN — Medication 1 LOZENGE: at 05:39

## 2020-04-27 RX ADMIN — SENNOSIDES AND DOCUSATE SODIUM 2 TABLET: 8.6; 5 TABLET ORAL at 21:45

## 2020-04-27 RX ADMIN — INSULIN ASPART 1 UNITS: 100 INJECTION, SOLUTION INTRAVENOUS; SUBCUTANEOUS at 11:40

## 2020-04-27 RX ADMIN — APIXABAN 2.5 MG: 2.5 TABLET, FILM COATED ORAL at 21:45

## 2020-04-27 RX ADMIN — UMECLIDINIUM 1 PUFF: 62.5 AEROSOL, POWDER ORAL at 19:49

## 2020-04-27 RX ADMIN — INSULIN ASPART 3 UNITS: 100 INJECTION, SOLUTION INTRAVENOUS; SUBCUTANEOUS at 17:37

## 2020-04-27 RX ADMIN — VERAPAMIL HYDROCHLORIDE 180 MG: 180 TABLET, FILM COATED, EXTENDED RELEASE ORAL at 21:45

## 2020-04-27 RX ADMIN — SODIUM CHLORIDE: 9 INJECTION, SOLUTION INTRAVENOUS at 12:35

## 2020-04-27 RX ADMIN — INSULIN GLARGINE 12 UNITS: 100 INJECTION, SOLUTION SUBCUTANEOUS at 21:49

## 2020-04-27 RX ADMIN — APIXABAN 2.5 MG: 2.5 TABLET, FILM COATED ORAL at 07:50

## 2020-04-27 ASSESSMENT — ACTIVITIES OF DAILY LIVING (ADL)
ADLS_ACUITY_SCORE: 15
ADLS_ACUITY_SCORE: 13
ADLS_ACUITY_SCORE: 15
ADLS_ACUITY_SCORE: 15
ADLS_ACUITY_SCORE: 13
ADLS_ACUITY_SCORE: 15

## 2020-04-27 ASSESSMENT — MIFFLIN-ST. JEOR: SCORE: 1442

## 2020-04-27 NOTE — PLAN OF CARE
Discharge Planner OT   Patient plan for discharge: -  Current status: order received. Per discussion with PT, pt is IND with ADL's and mobility at this time. Per PT, RN reports no cognitive concerns. Will complete OT orders as pt does not have needs.  Barriers to return to prior living situation: defer to medical team   Recommendations for discharge: defer to medical team   Rationale for recommendations: no acute OT needs. Will sign off.        Entered by: Rody Tong 04/27/2020 10:22 AM

## 2020-04-27 NOTE — PLAN OF CARE
Discharge Planner PT   Patient plan for discharge: Home  Current status: Order received, chart reviewed and when therapist went to see patient, his nurse reported he was in the shower. Nurse reports patient walked to the shower independently without any balance deficits noted. He showered independently. Nurse reports patient has been up independently in the room with no concerns. Based on discussion with nurse, no PT needs indicated.   Barriers to return to prior living situation: none based on information provided from nurse  Recommendations for discharge: home  Rationale for recommendations: Per nurse patient is independent without safety, cognitive or balance concerns.        Entered by: Nadia Mejia 04/27/2020 9:07 AM

## 2020-04-27 NOTE — PLAN OF CARE
A/O x 4. No C/O pain. IND. Tolerating 2g Na diet. VSS, ex Afib: tele. CHO diet. PIV RUE, NS 75 mL/hr. Type II diabetes, treating elevated sugars with sliding scale. Creat 2.31, BUN 37. Discharge to home pending improvement of creat & BUN.

## 2020-04-27 NOTE — CONSULTS
"CLINICAL NUTRITION SERVICES  -  ASSESSMENT NOTE      Malnutrition: % Weight Loss:  > 10% in 6 months (severe malnutrition)  % Intake:  <75% for >/= 1 month (non-severe malnutrition)  Subcutaneous Fat Loss:  Orbital region - mild depletion (12/31/19)  Muscle Loss:  Temporal region - mild depletion and Clavicle bone region - mild depletion (12/31/19)  Fluid Retention:  None noted    Malnutrition Diagnosis: Non-Severe malnutrition  In Context of:  Acute illness or injury  Chronic illness or disease        REASON FOR ASSESSMENT  Tc Garcia is a 80 year old male seen by Registered Dietitian for Admission Nutrition Risk Screen for new/uncontrolled diabetes      NUTRITION HISTORY  - Information obtained from patient via phone (unable to visit in person due to distancing precautions during COVID-19 pandemic).  He states that after he discharged hospital in early January 2020, he \"took awhile to get started\" with his oral intake but them did very well (\"cooking and eating fine\") until about two weeks ago.  For about two weeks prior to admit he was so weak and SOB that his oral intake was quite limited.  He has been taking an nutritional supplement at home but really does not like these.  \"I'm hoping that once I'm eating better I won't need to take them anymore\".    - In regards to diabetes, patient has his own pump and does carb counting at home.       CURRENT NUTRITION ORDERS  Diet Order:     Moderate Consistent Carbohydrate     Current Intake/Tolerance:  Patient reports that his intake is \"super\" compared to how it was at home.  Was able to consume 100% of lunch today --> Hamburger, lemonade, and grapes.       NUTRITION FOCUSED PHYSICAL ASSESSMENT FOR DIAGNOSING MALNUTRITION)  No:  Unable to visit patient in person during COVID-19 global pandemic        My colleague was able to see patient in person on 12/31/19 and noted subcutaneous fat and muscle loss            Observed:    Muscle wasting (refer to documentation in " "Malnutrition section) and Subcutaneous fat loss (refer to documentation in Malnutrition section) (12/31/20)    Obtained from Chart/Interdisciplinary Team:  None     ANTHROPOMETRICS  Height: 5' 9\"  Weight: 74.7 kg (165#)(4/27)  Body mass index is 24.31 kg/m   Weight Status:  Normal BMI  IBW: 72.7 kg   % IBW: 103%  Weight History:   Wt Readings from Last 10 Encounters:   04/27/20 74.7 kg (164 lb 9.6 oz)   04/16/20 72.6 kg (160 lb)   03/23/20 74.8 kg (165 lb)   02/26/20 79.8 kg (176 lb)   02/25/20 80 kg (176 lb 4.8 oz)   01/24/20 79.4 kg (175 lb)   01/07/20 79.3 kg (174 lb 12.8 oz)   12/30/19 78 kg (172 lb)   11/20/19 95.8 kg (211 lb 4.8 oz)   11/21/19 97.5 kg (215 lb)   Patient was 172# on 12/31/19 visit --> down 7# or 4% over the last 4 months   Down 45# or 21% over the last 5 months       LABS  Labs reviewed    MEDICATIONS  Hemoglobin A1c 8.2 (H) - 4/25/20      ASSESSED NUTRITION NEEDS PER APPROVED PRACTICE GUIDELINES:    Dosing Weight 74.7 kg   Estimated Energy Needs: 0658-8081 kcals (25-30 Kcal/Kg)  Justification: maintenance  Estimated Protein Needs:  grams protein (1.2-1.5 g pro/Kg)  Justification: hypercatabolism with acute illness  Estimated Fluid Needs: 3711-1925 mL (1 mL/Kcal)  Justification: maintenance    MALNUTRITION:  % Weight Loss:  > 10% in 6 months (severe malnutrition)  % Intake:  <75% for >/= 1 month (non-severe malnutrition)  Subcutaneous Fat Loss:  Orbital region - mild depletion (12/31/19)  Muscle Loss:  Temporal region - mild depletion and Clavicle bone region - mild depletion (12/31/19)  Fluid Retention:  None noted    Malnutrition Diagnosis: Non-Severe malnutrition  In Context of:  Acute illness or injury  Chronic illness or disease    NUTRITION DIAGNOSIS:  Unintended weight loss related to recent weakness and SOB causing reduced oral intake as evidenced by 21% weight loss over the last 5 months       NUTRITION INTERVENTIONS  Recommendations / Nutrition Prescription  Continue Moderate " CHO diet as tolerated   Offered oral nutritional supplements but patient politely declined     Implementation  Nutrition education: Per Provider order if indicated     Nutrition Goals  Patient will continue to consume >/= 75% meals TID   Patient will not lose any further weight beyond 74.7 kg    MONITORING AND EVALUATION:  Progress towards goals will be monitored and evaluated per protocol and Practice Guidelines    Oksana Lovell RD, LD, CNSC   Clinical Dietitian - [facility]

## 2020-04-27 NOTE — PLAN OF CARE
Aox4, pleasant. VSS on RA, ex hypertensive; Tele: Afib, CVR w/BBB. No c/o pain. Up in room ind. PIV infusing NS at 50 ml/hr. Tolerating mod carb diet, w/ BG checks, 347. Crt 2.31, BUN 37. Provided education on use of incentive spirometer, with good demonstration.  Discharge to home pending improvement of crt and BUN.

## 2020-04-27 NOTE — PROGRESS NOTES
[facility]    Medicine Progress Note - Hospitalist Service       Date of Admission:  4/25/2020  Assessment & Plan    Mr. Garcia is an 80-year-old male with a past medical history significant for atrial fibrillation, diabetes, hypertension and recurrent left pleural effusions, who presents to the Emergency Department with 3-4 weeks of generally feeling unwell with diarrhea today and found to have acute kidney injury.     Acute kidney injury     has had poor p.o. intake, so this may be all due to dehydration    Continue IV fluids, decrease rate.  Saline lock IV tomorrow if oral intake continues to be good and creatinine is stable or declining    note that he had a preserved EF of 60%, but did have a severe left atrial dilation, decreased RV function on echocardiogram in 10/2019.  We will repeat a BMP daily    Poor p.o. intake, diarrhea and feeling unwell     COVID-19 is negative    Symptoms are improved with rehydration    Diabetes     insulin pump for now given the hypoglycemia and his renal failure    also plan to hold metformin and Actos    Patient was given roughly a third of his typical basal insulin with Lantus 6 units this evening and hold any prandial coverage, but will have a low dose sliding scale as needed.      Today's blood sugar readings are above goal, increase basal insulin again    Since p.o. intake and his renal function improved, could consider restarting his insulin pump in the next several days.    Check hemoglobin A1c    Paroxysmal atrial fibrillation    Now with mild, asymptomatic bradycardia    Hold amiodarone    Decrease verapamil to 180 mg daily    Considering risks versus benefits of anticoagulants versus thromboembolism, resume apixaban now.  If needs thoracentesis in future, can hold prior to procedure    Hypertension    Occasional blood pressure readings are at goal    Continue clonidine, verapamil, see discussion above    Anemia    Hemoglobin is 10.3 and most recent was  "12.5 though historically he appears to be right around 10    Patient has no signs or symptoms of bleeding    Recheck hemoglobin    Diet: Moderate Consistent CHO Diet    DVT Prophylaxis: Apixaban  Pruett Catheter: not present  Code Status: DNR/DNI      Disposition Plan   Expected discharge: 1-2 days, recommended to prior living arrangement once renal function improved.  Entered: Susu Rodriguez MD 04/27/2020, 3:26 PM       The patient's care was discussed with the Bedside Nurse.    Susu Rodriguez MD  Hospitalist Service  [facility]    ______________________________________________________________________    Interval History    \"I ordered a hamburger and I ate the whole thing.\"  Patient says his appetite returned.  He says his breathing is comfortable, he does not feel short of breath with activity, no chest pain.  He passed his PT and OT evaluations.    Data reviewed today: I reviewed all medications, new labs and imaging results over the last 24 hours. I personally reviewed the chest x-ray image(s) showing Moderate left pleural effusion..    Physical Exam   Vital Signs: Temp: 96.6  F (35.9  C) Temp src: Oral BP: (!) 167/89   Heart Rate: 82 Resp: 18 SpO2: 93 % O2 Device: None (Room air)    Weight: 164 lbs 9.6 oz  Constitutional: Frail, chronically ill-appearing man.  Awake, alert, cooperative, no apparent distress  Respiratory: Decreased breath sounds left lung base  Cardiovascular: Irregularly irregular rhythm  GI: Normal bowel sounds, soft, non-distended, non-tender  Skin/Integumen: No rashes, no cyanosis, no edema  Other: Mood is upbeat      Data   Recent Labs   Lab 04/27/20  0828 04/26/20  0701 04/25/20  1228   WBC  --  8.4 10.5   HGB 8.8* 8.4* 10.3*   MCV  --  85 84   PLT  --  333 383    137 138   POTASSIUM 4.0 4.0 4.1   CHLORIDE 107 106 102   CO2 28 26 26   BUN 37* 48* 53*   CR 2.31* 2.73* 3.06*   ANIONGAP 3 5 10   LLOYD 8.5 8.1* 9.2   * 288* 112*   ALBUMIN  --   --  3.0* "   PROTTOTAL  --   --  6.3*   BILITOTAL  --   --  0.4   ALKPHOS  --   --  79   ALT  --   --  21   AST  --   --  21     No results found for this or any previous visit (from the past 24 hour(s)).

## 2020-04-27 NOTE — PLAN OF CARE
A/O x 4. Denies pain. Stand-by assist. Telemetry showing afib. Tolerating 2 gm Na diet, but has little appetite. Has insulin pump for dm type II at home, but is not using it while inpatient. Covered elevated blood sugars with sliding scale. PIV running NS at 75 mL/hr. Mild BLE edema and diminished lungs in lower lobes. Sore throat treated with PRN lozanges. Discharge pending once labs improves.

## 2020-04-28 LAB
ANION GAP SERPL CALCULATED.3IONS-SCNC: 5 MMOL/L (ref 3–14)
BUN SERPL-MCNC: 26 MG/DL (ref 7–30)
CALCIUM SERPL-MCNC: 8.4 MG/DL (ref 8.5–10.1)
CHLORIDE SERPL-SCNC: 109 MMOL/L (ref 94–109)
CO2 SERPL-SCNC: 27 MMOL/L (ref 20–32)
CREAT SERPL-MCNC: 2.07 MG/DL (ref 0.66–1.25)
GFR SERPL CREATININE-BSD FRML MDRD: 29 ML/MIN/{1.73_M2}
GLUCOSE BLDC GLUCOMTR-MCNC: 133 MG/DL (ref 70–99)
GLUCOSE BLDC GLUCOMTR-MCNC: 156 MG/DL (ref 70–99)
GLUCOSE BLDC GLUCOMTR-MCNC: 237 MG/DL (ref 70–99)
GLUCOSE BLDC GLUCOMTR-MCNC: 306 MG/DL (ref 70–99)
GLUCOSE BLDC GLUCOMTR-MCNC: 99 MG/DL (ref 70–99)
GLUCOSE SERPL-MCNC: 111 MG/DL (ref 70–99)
POTASSIUM SERPL-SCNC: 3.6 MMOL/L (ref 3.4–5.3)
SODIUM SERPL-SCNC: 141 MMOL/L (ref 133–144)

## 2020-04-28 PROCEDURE — 25000132 ZZH RX MED GY IP 250 OP 250 PS 637: Performed by: INTERNAL MEDICINE

## 2020-04-28 PROCEDURE — 12000000 ZZH R&B MED SURG/OB

## 2020-04-28 PROCEDURE — 80048 BASIC METABOLIC PNL TOTAL CA: CPT | Performed by: INTERNAL MEDICINE

## 2020-04-28 PROCEDURE — 25800030 ZZH RX IP 258 OP 636: Performed by: INTERNAL MEDICINE

## 2020-04-28 PROCEDURE — 25000128 H RX IP 250 OP 636: Performed by: INTERNAL MEDICINE

## 2020-04-28 PROCEDURE — 00000146 ZZHCL STATISTIC GLUCOSE BY METER IP

## 2020-04-28 PROCEDURE — 36415 COLL VENOUS BLD VENIPUNCTURE: CPT | Performed by: INTERNAL MEDICINE

## 2020-04-28 PROCEDURE — 99232 SBSQ HOSP IP/OBS MODERATE 35: CPT | Performed by: INTERNAL MEDICINE

## 2020-04-28 PROCEDURE — 25000131 ZZH RX MED GY IP 250 OP 636 PS 637: Performed by: INTERNAL MEDICINE

## 2020-04-28 RX ORDER — FUROSEMIDE 10 MG/ML
20 INJECTION INTRAMUSCULAR; INTRAVENOUS ONCE
Status: COMPLETED | OUTPATIENT
Start: 2020-04-28 | End: 2020-04-28

## 2020-04-28 RX ADMIN — APIXABAN 2.5 MG: 2.5 TABLET, FILM COATED ORAL at 08:24

## 2020-04-28 RX ADMIN — VERAPAMIL HYDROCHLORIDE 180 MG: 180 TABLET, FILM COATED, EXTENDED RELEASE ORAL at 21:19

## 2020-04-28 RX ADMIN — UMECLIDINIUM 1 PUFF: 62.5 AEROSOL, POWDER ORAL at 20:22

## 2020-04-28 RX ADMIN — SODIUM CHLORIDE: 9 INJECTION, SOLUTION INTRAVENOUS at 06:56

## 2020-04-28 RX ADMIN — CLONIDINE HYDROCHLORIDE 0.3 MG: 0.1 TABLET ORAL at 20:22

## 2020-04-28 RX ADMIN — CLONIDINE HYDROCHLORIDE 0.3 MG: 0.1 TABLET ORAL at 08:24

## 2020-04-28 RX ADMIN — INSULIN GLARGINE 6 UNITS: 100 INJECTION, SOLUTION SUBCUTANEOUS at 21:19

## 2020-04-28 RX ADMIN — INSULIN ASPART 1 UNITS: 100 INJECTION, SOLUTION INTRAVENOUS; SUBCUTANEOUS at 17:36

## 2020-04-28 RX ADMIN — FUROSEMIDE 20 MG: 10 INJECTION, SOLUTION INTRAMUSCULAR; INTRAVENOUS at 13:24

## 2020-04-28 ASSESSMENT — ACTIVITIES OF DAILY LIVING (ADL)
ADLS_ACUITY_SCORE: 13
ADLS_ACUITY_SCORE: 13
ADLS_ACUITY_SCORE: 15
ADLS_ACUITY_SCORE: 13

## 2020-04-28 ASSESSMENT — MIFFLIN-ST. JEOR: SCORE: 1453.79

## 2020-04-28 NOTE — PLAN OF CARE
Pt. A/Ox4. VSS on RA ex hypertensive in the 160s. Denies pain. Up Ind. Ambulated in the halls x1. Senna given per patient request. Insulin pump and freestyle glucometer in patient belonging bag. Tele: Afib CVR. Discharge pending Crt and BUN level.

## 2020-04-28 NOTE — PROGRESS NOTES
[facility]    Medicine Progress Note - Hospitalist Service       Date of Admission:  4/25/2020  Assessment & Plan    Mr. Garcia is an 80-year-old male with a past medical history significant for atrial fibrillation, diabetes, hypertension and recurrent left pleural effusions, who presents to the Emergency Department with 3-4 weeks of generally feeling unwell with diarrhea today and found to have acute kidney injury.     Acute kidney injury     has had poor p.o. intake, so this may be all due to dehydration    Continue IV fluids, decrease rate.  Saline lock IV tomorrow if oral intake continues to be good and creatinine is stable or declining    note that he had a preserved EF of 60%, but did have a severe left atrial dilation, decreased RV function on echocardiogram in 10/2019.  We will repeat a BMP daily    Poor p.o. intake, diarrhea and feeling unwell     COVID-19 is negative    Symptoms initially improved with rehydration, but he complains of nausea again today    Diabetes    discontinue insulin pump for now given the hypoglycemia and his renal failure    also plan to hold metformin and Actos    Reduce basal insulin today since he may need to be n.p.o. after midnight for thoracentesis tomorrow    hemoglobin A1c is 8.2, reflecting relatively poor control    Paroxysmal atrial fibrillation    Now with mild, asymptomatic bradycardia    Hold amiodarone    Decrease verapamil to 180 mg daily    Hold this evening's dose of apixaban so he can have ultrasound-guided thoracentesis tomorrow    Hypertension    Occasional blood pressure readings are at goal    Continue clonidine, verapamil, see discussion above    Anemia    Hemoglobin is 10.3 and most recent was 12.5 though historically he appears to be right around 10    Patient has no signs or symptoms of bleeding    Recheck hemoglobin    Left pleural effusion    Chest x-ray shows that pleural fluid is reaccumulating    Today, he reports mild dyspnea with  "exertion and does need supplemental oxygen    Repeat ultrasound-guided thoracentesis tomorrow, therapeutic but not diagnostic since he has had prior thoracentesis 12/31/2019 with studies sent at that time    Non-severe malnutrition    RD following, please see their recommendations    Diet: Moderate Consistent CHO Diet    DVT Prophylaxis: Apixaban  Pruett Catheter: not present  Code Status: DNR/DNI      Disposition Plan   Expected discharge: 1-2 days, recommended to prior living arrangement once renal function improved.  Entered: Susu Rodriguez MD 04/28/2020, 12:02 PM       The patient's care was discussed with the Bedside Nurse.    Susu Rodriguez MD  Hospitalist Service  [facility]    ______________________________________________________________________    Interval History    \"Today, all I had for breakfast was a blueberry muffin.\"  Patient says that his appetite is not good today.  He is worried that he will not be able to eat well.  He went for a walk with staff, says he felt mildly short of breath with exertion.  He denies chest pain.    Data reviewed today: I reviewed all medications, new labs and imaging results over the last 24 hours. I personally reviewed the chest x-ray image(s) showing Moderate left pleural effusion..    Physical Exam   Vital Signs: Temp: 97.4  F (36.3  C) Temp src: Oral BP: (!) 163/84 Pulse: 82 Heart Rate: 85 Resp: 16 SpO2: 94 % O2 Device: Nasal cannula Oxygen Delivery: 1.5 LPM  Weight: 167 lbs 3.2 oz  Constitutional: Frail, chronically ill-appearing man.  Awake, alert, cooperative, no apparent distress  Respiratory: Decreased breath sounds left lung  Cardiovascular: Irregularly irregular rhythm  GI: Normal bowel sounds, soft, non-distended, non-tender  Skin/Integumen: No rashes, no cyanosis, no edema  Other: Mood is somewhat anxious today      Data   Recent Labs   Lab 04/28/20  0733 04/27/20  0828 04/26/20  0701 04/25/20  1228   WBC  --   --  8.4 10.5   HGB  --  8.8* " 8.4* 10.3*   MCV  --   --  85 84   PLT  --   --  333 383    138 137 138   POTASSIUM 3.6 4.0 4.0 4.1   CHLORIDE 109 107 106 102   CO2 27 28 26 26   BUN 26 37* 48* 53*   CR 2.07* 2.31* 2.73* 3.06*   ANIONGAP 5 3 5 10   LLOYD 8.4* 8.5 8.1* 9.2   * 158* 288* 112*   ALBUMIN  --   --   --  3.0*   PROTTOTAL  --   --   --  6.3*   BILITOTAL  --   --   --  0.4   ALKPHOS  --   --   --  79   ALT  --   --   --  21   AST  --   --   --  21     No results found for this or any previous visit (from the past 24 hour(s)).

## 2020-04-29 ENCOUNTER — APPOINTMENT (OUTPATIENT)
Dept: ULTRASOUND IMAGING | Facility: CLINIC | Age: 80
DRG: 683 | End: 2020-04-29
Attending: INTERNAL MEDICINE
Payer: COMMERCIAL

## 2020-04-29 LAB
ANION GAP SERPL CALCULATED.3IONS-SCNC: 6 MMOL/L (ref 3–14)
BUN SERPL-MCNC: 22 MG/DL (ref 7–30)
CALCIUM SERPL-MCNC: 8.3 MG/DL (ref 8.5–10.1)
CHLORIDE SERPL-SCNC: 108 MMOL/L (ref 94–109)
CO2 SERPL-SCNC: 26 MMOL/L (ref 20–32)
CREAT SERPL-MCNC: 1.75 MG/DL (ref 0.66–1.25)
GFR SERPL CREATININE-BSD FRML MDRD: 36 ML/MIN/{1.73_M2}
GLUCOSE BLDC GLUCOMTR-MCNC: 134 MG/DL (ref 70–99)
GLUCOSE BLDC GLUCOMTR-MCNC: 178 MG/DL (ref 70–99)
GLUCOSE BLDC GLUCOMTR-MCNC: 290 MG/DL (ref 70–99)
GLUCOSE BLDC GLUCOMTR-MCNC: 322 MG/DL (ref 70–99)
GLUCOSE BLDC GLUCOMTR-MCNC: 328 MG/DL (ref 70–99)
GLUCOSE SERPL-MCNC: 146 MG/DL (ref 70–99)
POTASSIUM SERPL-SCNC: 3.3 MMOL/L (ref 3.4–5.3)
SODIUM SERPL-SCNC: 140 MMOL/L (ref 133–144)

## 2020-04-29 PROCEDURE — 25000132 ZZH RX MED GY IP 250 OP 250 PS 637: Performed by: INTERNAL MEDICINE

## 2020-04-29 PROCEDURE — 25000125 ZZHC RX 250: Performed by: RADIOLOGY

## 2020-04-29 PROCEDURE — 25000131 ZZH RX MED GY IP 250 OP 636 PS 637: Performed by: INTERNAL MEDICINE

## 2020-04-29 PROCEDURE — 0W9B3ZZ DRAINAGE OF LEFT PLEURAL CAVITY, PERCUTANEOUS APPROACH: ICD-10-PCS | Performed by: PHYSICIAN ASSISTANT

## 2020-04-29 PROCEDURE — 36415 COLL VENOUS BLD VENIPUNCTURE: CPT | Performed by: INTERNAL MEDICINE

## 2020-04-29 PROCEDURE — 40000863 ZZH STATISTIC RADIOLOGY XRAY, US, CT, MAR, NM

## 2020-04-29 PROCEDURE — 80048 BASIC METABOLIC PNL TOTAL CA: CPT | Performed by: INTERNAL MEDICINE

## 2020-04-29 PROCEDURE — 00000146 ZZHCL STATISTIC GLUCOSE BY METER IP

## 2020-04-29 PROCEDURE — 99232 SBSQ HOSP IP/OBS MODERATE 35: CPT | Performed by: INTERNAL MEDICINE

## 2020-04-29 PROCEDURE — 12000000 ZZH R&B MED SURG/OB

## 2020-04-29 PROCEDURE — 99207 ZZC CDG-MDM COMPONENT: MEETS LOW - DOWN CODED: CPT | Performed by: INTERNAL MEDICINE

## 2020-04-29 RX ORDER — LIDOCAINE HYDROCHLORIDE 10 MG/ML
10 INJECTION, SOLUTION EPIDURAL; INFILTRATION; INTRACAUDAL; PERINEURAL ONCE
Status: COMPLETED | OUTPATIENT
Start: 2020-04-29 | End: 2020-04-29

## 2020-04-29 RX ORDER — POTASSIUM CHLORIDE 1500 MG/1
40 TABLET, EXTENDED RELEASE ORAL ONCE
Status: COMPLETED | OUTPATIENT
Start: 2020-04-29 | End: 2020-04-29

## 2020-04-29 RX ADMIN — CLONIDINE HYDROCHLORIDE 0.3 MG: 0.1 TABLET ORAL at 09:30

## 2020-04-29 RX ADMIN — POLYETHYLENE GLYCOL 3350 17 G: 17 POWDER, FOR SOLUTION ORAL at 17:46

## 2020-04-29 RX ADMIN — INSULIN GLARGINE 6 UNITS: 100 INJECTION, SOLUTION SUBCUTANEOUS at 21:17

## 2020-04-29 RX ADMIN — POTASSIUM CHLORIDE 40 MEQ: 1500 TABLET, EXTENDED RELEASE ORAL at 09:30

## 2020-04-29 RX ADMIN — UMECLIDINIUM 1 PUFF: 62.5 AEROSOL, POWDER ORAL at 21:16

## 2020-04-29 RX ADMIN — INSULIN ASPART 2 UNITS: 100 INJECTION, SOLUTION INTRAVENOUS; SUBCUTANEOUS at 13:45

## 2020-04-29 RX ADMIN — VERAPAMIL HYDROCHLORIDE 180 MG: 180 TABLET, FILM COATED, EXTENDED RELEASE ORAL at 21:17

## 2020-04-29 RX ADMIN — LIDOCAINE HYDROCHLORIDE 10 ML: 10 INJECTION, SOLUTION EPIDURAL; INFILTRATION; INTRACAUDAL; PERINEURAL at 13:08

## 2020-04-29 RX ADMIN — CLONIDINE HYDROCHLORIDE 0.3 MG: 0.1 TABLET ORAL at 21:17

## 2020-04-29 ASSESSMENT — MIFFLIN-ST. JEOR: SCORE: 1465.59

## 2020-04-29 ASSESSMENT — ACTIVITIES OF DAILY LIVING (ADL)
ADLS_ACUITY_SCORE: 13

## 2020-04-29 NOTE — PLAN OF CARE
Pt A/Ox4. VSS on 2L O2. Denied pain. LLL LS diminished. Mod carb diet. BS check 178. Plan for thoracentesis today.

## 2020-04-29 NOTE — PROGRESS NOTES
RADIOLOGY PROCEDURE NOTE  Patient name: Tc Garcia  MRN: [id number]  : 1940    Pre-procedure diagnosis: Left pleural effusion  Post-procedure diagnosis: Same    Procedure Date/Time: 2020  1:45 PM  Procedure: Left thoracentesis  Estimated blood loss: None  Specimen(s) collected with description: yellow fluid  The patient tolerated the procedure well with no immediate complications.  Significant findings:none    See imaging dictation for procedural details.    Provider name: Miles Johnson PA-C  Assistant(s):None

## 2020-04-29 NOTE — PROGRESS NOTES
Care Suites Procedure Nursing Note    Patient Information  Name: Tc Garcia  Age: 80 year old    Procedure  Procedure: US Guided Thoracentesis   Procedure start time: 1258  Procedure complete time:   Concerns/abnormal assessment: INR  1.88, Plt 333  Covid19 Negative. Pt. States no fever, SOB or cough.   Last dose of Eliquis 4/28/2020 at 0830  If abnormal assessment, provider notified: Yes  Plan/Other: US Guided LeftThoracentesis     13[address] BUCIO in room  Consent completed and Time out completed.     2 liters dark Red fluid drained.   1322- Drain discontinued.   VSS. Pt. Tolerated procedure.  C/o heavy pain in left lung at the end of procedure- pain less at time of transfer   1330- Transport on cart to [place] [id number]-  Report given to Tarik Croft RN

## 2020-04-29 NOTE — PROGRESS NOTES
[facility]    Hospitalist Progress Note    Assessment & Plan   Tc Garcia is a 80 year old male with PMHx of hypertension, afib on chronic anticoagulation with Eliquis, hypertension, recurrent L pleural effusion and   DM II on an insulin pump who was admitted on 4/25/2020 for management of ABBIE in the setting of diarrhea and complaints of generally feeling unwell over the preceding 3-4 wks.    ABBIE: Improved  Endorsed poor oral intake prior to admission with some associated diarrhea.Cr 3.0 on admission. Presumed pre-renal. No hx of CKD. Given IVFs this stay. Renal function has since improved  -- Cr improving -- 1.75 today  -- saline locked today (4/29)  -- appetite remains marginal, though starting to improve this evening -- encourage po intake     Poor Appetite / Oral Intake: Improved   Diarrhea: Resolved  Non-Severe Malnutrition  Endorsed generally feeling unwell for the 3-4 wks preceding admission. Had some associated diarrhea prior to admission. Supportive cares started on admission. COVID19 test was negative. Diarrhea has since resolved. Seen by nutritionist, found to have non-severe malnutrition -- supplements recommended but patient declined. Sx initially improved with hydration.   -- appetite starting to   -- monitor sx and oral intake    Left pleural effusion, s/p thoracentesis on 4/29  Has hx of pleural effusions. CXR on admission showed a moderate left pleural effusion. O2 needs were noted to be increasing with associated dyspnea. Underwent L thoracentesis per IR on 4/29 with removal of 2000 ml dark red fluid.   -- respiratory status improved following thoracentesis,weaned off O2 this afternoon     DM II, on insulin pump  A1C 8.2 this stay.   Manages with insulin pump, metformin and Actos.   Home insulin pump settings:  INSULIN PUMP - OUTPATIENT  Novolog Insulin   BASAL RATES and times:   Midnight to 6am: 0.65 units/hr   6am to 10:30 pm: 0.95 units/hour     10:30pm to midnight:  0.55 units/hr   CARB RATIO: 1 unit per 15 grams   Correction Factor (Sensitivity): 55mg/dL     BLOOD GLUCOSE TARGET and times:   12   AM (midnight): 100 - 140   5:30     AM:  100 - 120   10   PM:  100 - 140   Active Insulin Time:  5 hours   Bolus-Correction 1 unit(s) to lower blood glucose by 55 mg/dL     Checks insulin about 4-5 times a day manually   (Per Rx, pt uses 50-60 units/day)      Insulin pump held this stay dt hypoglycemia in the setting of renal failure.   Metformin (500mg BID) and Actos (30mg daily) also held on admission dt renal failure  Have been managing with Lantus and sliding scale insulin this stay    Recent Labs   Lab 04/29/20  1341 04/29/20  0726 04/29/20  0700 04/29/20  0159 04/28/20  2102 04/28/20  1724 04/28/20  1135  04/28/20  0733  04/27/20  0828  04/26/20  0701  04/25/20  1228   GLC  --   --  146*  --   --   --   --   --  111*  --  158*  --  288*  --  112*   * 134*  --  178* 306* 237* 133*   < >  --    < >  --    < >  --    < >  --     < > = values in this interval not displayed.       -- cont basal insulin with Lantus 6U HS  -- will change from low dose sliding scale insulin to med dose sliding scale insulin today  -- anticipate resumption of insulin pump in next 1-2d as renal function continues to improve    Hypertension  Dyslipidemia  Paroxysmal Afib  Last echo in 10/2019 showed EF 60% with severe LA dilation and decreased RV function  PTA meds: amiodarone 200mg daily, lisinopril 40mg daily, verapamil ER 240mg daily, clonidine 0.3mg BID, lovastatin 20mg daily apixaban 5mg BID  -- amiodarone held as of 4/26 -- will resume today (4/29)  -- verapamil dose decreased to 180mg daily given findings of mild asymptomatic bradycardia this stay  -- apixaban held for thoracentesis -- can resume in AM  -- conts on clonidine     Anemia  Baseline hgb appears to be around 10. Hgb as low as 8.8 this stay but no s/sx of bleeding. Could be due in part to dilution dt need for IVFs this stay.   --  repeat hgb in AM    Hypokalemia  K 3.3 this AM. Given KCl 40mEq po x1 (no replacement protocol dt renal failure on admission)  -- repeat BMP in AM    FEN: no IVFs, lytes stable, mod CHO diet  DVT Prophylaxis: chronically anticoagulated on Eliquis  Code Status: DNR/DNI    Disposition: Anticipate discharge home in next 1-2d, pending ability to take po and stable renal function.    Kelley Fernandez    Interval History   Felt nauseated earlier today and didn't eat much. Feeling better this afternoon and has ordered a more substantial dinner. Breathing better following thoracentesis earlier today. Denies cp/sob/cough. Weaned off O2 this afternoon. No abd pain.     -Data reviewed today: I reviewed all new labs and imaging results over the last 24 hours. I personally reviewed no images or EKG's today.    Physical Exam   Temp: 98.1  F (36.7  C) Temp src: Oral BP: (!) 154/82 Pulse: 77 Heart Rate: 73 Resp: 28 SpO2: 94 % O2 Device: None (Room air) Oxygen Delivery: 2 LPM  Vitals:    04/27/20 0539 04/28/20 0554 04/29/20 0554   Weight: 74.7 kg (164 lb 9.6 oz) 75.8 kg (167 lb 3.2 oz) 77 kg (169 lb 12.8 oz)     Vital Signs with Ranges  Temp:  [96.3  F (35.7  C)-98.1  F (36.7  C)] 98.1  F (36.7  C)  Pulse:  [73-77] 77  Heart Rate:  [72-77] 73  Resp:  [16-28] 28  BP: (131-164)/(72-84) 154/82  SpO2:  [86 %-100 %] 94 %  I/O last 3 completed shifts:  In: 960 [P.O.:960]  Out: 400 [Urine:400]    Constitutional: Resting comfortably, alert and conversing appropriately, NAD  Respiratory: CTAB, no wheeze/rales/rhonchi, no increased work of breathing  Cardiovascular: HR irregular, no MGR, +bilateral LE edema to the mid shins  GI: S, NT, ND, +BS  Skin/Integumen: warm/dry  Other:      Medications     - MEDICATION INSTRUCTIONS -         [Held by provider] amiodarone  200 mg Oral Daily     [Held by provider] apixaban ANTICOAGULANT  2.5 mg Oral BID     cloNIDine  0.3 mg Oral BID     insulin aspart  1-3 Units Subcutaneous TID AC     insulin  aspart  1-3 Units Subcutaneous At Bedtime     insulin glargine  6 Units Subcutaneous At Bedtime     sodium chloride (PF)  3 mL Intracatheter Q8H     umeclidinium  1 puff Inhalation QPM     verapamil ER  180 mg Oral At Bedtime       Data   Recent Labs   Lab 04/29/20  0700 04/28/20  0733 04/27/20  0828 04/26/20  0701 04/25/20  1228   WBC  --   --   --  8.4 10.5   HGB  --   --  8.8* 8.4* 10.3*   MCV  --   --   --  85 84   PLT  --   --   --  333 383    141 138 137 138   POTASSIUM 3.3* 3.6 4.0 4.0 4.1   CHLORIDE 108 109 107 106 102   CO2 26 27 28 26 26   BUN 22 26 37* 48* 53*   CR 1.75* 2.07* 2.31* 2.73* 3.06*   ANIONGAP 6 5 3 5 10   LLOYD 8.3* 8.4* 8.5 8.1* 9.2   * 111* 158* 288* 112*   ALBUMIN  --   --   --   --  3.0*   PROTTOTAL  --   --   --   --  6.3*   BILITOTAL  --   --   --   --  0.4   ALKPHOS  --   --   --   --  79   ALT  --   --   --   --  21   AST  --   --   --   --  21       No results found for this or any previous visit (from the past 24 hour(s)).

## 2020-04-29 NOTE — PLAN OF CARE
Summary:     DATE & TIME: 4/27/2020 1900-2330  Cognitive Concerns/ Orientation : A&O x4  BEHAVIOR & AGGRESSION TOOL COLOR: Green  CIWA SCORE: NA   ABNL VS/O2: 94% O2 1.5L  MOBILITY: IND  PAIN MANAGMENT: Denies  DIET: MOD CHO  BOWEL/BLADDER: Usually continent, using urinal at bedside  ABNL LAB/BG: Creat 2.07 & BUN 26  DRAIN/DEVICES: PIV SL  TELEMETRY RHYTHM: Afib CVR  SKIN: WDL  TESTS/PROCEDURES: Thoracentesis planned for AM  D/C DAY/GOALS/PLACE: Home when creat & BUN improve  OTHER IMPORTANT INFO:       Shift Note:  Pt. A/Ox4. VSS, new need 1-2L to keep >90% O2. Denies pain. Up Ind. Insulin pump and freestyle glucometer in patient belonging bag. Sliding scale insulin being used in hospital. , gave 2 units along with lantus @ HS. Diminished lung sounds in L lower lobe.

## 2020-04-29 NOTE — PLAN OF CARE
Pt A/Ox4. VSS. IND. Denies pain. Thoracentesis performed, improved SpO2 and lung sounds. Mod carb diet. BUN 22 & creat 1.75. K+ 3.3, replaced per protocol. Discharge to home after improvement of O2 use/SpO2.

## 2020-04-29 NOTE — PROGRESS NOTES
"SPIRITUAL HEALTH SERVICES: Tele-Encounter  Patient Location (Hospital,Unit):[facility]  Spoke with (patient, family relationship): pt      Referral Source: LOS    DATA: Talked with pt on the phone. Pt described how he got into the hospital and stated that he can eat and he is feeling much better now. Pt said he might go home either today or tomorrow. Noticed in the chart that pt is Islam, pt confirmed yes and  offered a prayer, pt declined at this time.      PLAN: no fellow up plan at this time, [facility] will remain available for any future needs.     BIRDIE Pitts    ______________________________    Type of service:  Telephone Visit     has received verbal consent for a TelephoneVisit from the patient? \"Yes\"    Distance Provider Location: designated [place] office or home office (secure setting)    Mode of Communication: telephone (via [employer] phone or [employer] tele-call-number ([phone]))      "

## 2020-04-30 VITALS
BODY MASS INDEX: 23.71 KG/M2 | TEMPERATURE: 96.4 F | HEART RATE: 74 BPM | WEIGHT: 160.1 LBS | SYSTOLIC BLOOD PRESSURE: 151 MMHG | OXYGEN SATURATION: 98 % | RESPIRATION RATE: 16 BRPM | HEIGHT: 69 IN | DIASTOLIC BLOOD PRESSURE: 81 MMHG

## 2020-04-30 LAB
ANION GAP SERPL CALCULATED.3IONS-SCNC: 8 MMOL/L (ref 3–14)
BUN SERPL-MCNC: 21 MG/DL (ref 7–30)
CALCIUM SERPL-MCNC: 8.3 MG/DL (ref 8.5–10.1)
CHLORIDE SERPL-SCNC: 109 MMOL/L (ref 94–109)
CO2 SERPL-SCNC: 25 MMOL/L (ref 20–32)
CREAT SERPL-MCNC: 1.62 MG/DL (ref 0.66–1.25)
GFR SERPL CREATININE-BSD FRML MDRD: 39 ML/MIN/{1.73_M2}
GLUCOSE BLDC GLUCOMTR-MCNC: 177 MG/DL (ref 70–99)
GLUCOSE BLDC GLUCOMTR-MCNC: 234 MG/DL (ref 70–99)
GLUCOSE BLDC GLUCOMTR-MCNC: 88 MG/DL (ref 70–99)
GLUCOSE SERPL-MCNC: 92 MG/DL (ref 70–99)
HGB BLD-MCNC: 8.5 G/DL (ref 13.3–17.7)
POTASSIUM SERPL-SCNC: 3.3 MMOL/L (ref 3.4–5.3)
SODIUM SERPL-SCNC: 142 MMOL/L (ref 133–144)

## 2020-04-30 PROCEDURE — 99239 HOSP IP/OBS DSCHRG MGMT >30: CPT | Performed by: INTERNAL MEDICINE

## 2020-04-30 PROCEDURE — 25000132 ZZH RX MED GY IP 250 OP 250 PS 637: Performed by: INTERNAL MEDICINE

## 2020-04-30 PROCEDURE — 85018 HEMOGLOBIN: CPT | Performed by: INTERNAL MEDICINE

## 2020-04-30 PROCEDURE — 36415 COLL VENOUS BLD VENIPUNCTURE: CPT | Performed by: INTERNAL MEDICINE

## 2020-04-30 PROCEDURE — 00000146 ZZHCL STATISTIC GLUCOSE BY METER IP

## 2020-04-30 PROCEDURE — 80048 BASIC METABOLIC PNL TOTAL CA: CPT | Performed by: INTERNAL MEDICINE

## 2020-04-30 RX ORDER — POTASSIUM CHLORIDE 1500 MG/1
40 TABLET, EXTENDED RELEASE ORAL ONCE
Status: COMPLETED | OUTPATIENT
Start: 2020-04-30 | End: 2020-04-30

## 2020-04-30 RX ORDER — VERAPAMIL HYDROCHLORIDE 240 MG/1
240 TABLET, FILM COATED, EXTENDED RELEASE ORAL AT BEDTIME
Status: DISCONTINUED | OUTPATIENT
Start: 2020-04-30 | End: 2020-04-30 | Stop reason: HOSPADM

## 2020-04-30 RX ADMIN — CLONIDINE HYDROCHLORIDE 0.3 MG: 0.1 TABLET ORAL at 08:40

## 2020-04-30 RX ADMIN — POTASSIUM CHLORIDE 40 MEQ: 1500 TABLET, EXTENDED RELEASE ORAL at 08:40

## 2020-04-30 RX ADMIN — AMIODARONE HYDROCHLORIDE 200 MG: 200 TABLET ORAL at 08:44

## 2020-04-30 ASSESSMENT — ACTIVITIES OF DAILY LIVING (ADL)
ADLS_ACUITY_SCORE: 13

## 2020-04-30 ASSESSMENT — MIFFLIN-ST. JEOR: SCORE: 1421.59

## 2020-04-30 NOTE — PLAN OF CARE
Pt aox4, VSS on RA, continuous pulse ox on. Tele: SR. Up ind in room, ambulated in halls. Thoracentesis done today with 2L off, L dressing to back CDI. Good appetite this evening, tolerating mod carb diet. BG checks with sliding scale insulin adjusted. Pt c/o constipation, PRN miralax given this evening; w/ good results, soft medium stool.  K+3.3, replaced today, recheck tmr. Insulin pump in pt's belonging, may resume pump in 1-2 days pending renal function.  Discharge pending improvement.

## 2020-04-30 NOTE — PLAN OF CARE
Alert and oriented x 4. Denies pain. Labs resolving. Saline locked and tolerating MOD CHO diet. Up independently. Call light appropriate and plans to discharge back to home today.

## 2020-04-30 NOTE — DISCHARGE SUMMARY
[facility]    Discharge Summary  Hospitalist    Date of Admission:  4/25/2020  Date of Discharge:  4/30/2020  Discharging Provider: Kelley Fernandez    Discharge Diagnoses   ABBIE: Improved  Poor Appetite / Oral Intake: Improved   Diarrhea: Resolved  Non-Severe Malnutrition  Left pleural effusion, s/p thoracentesis on 4/29  DM II, on insulin pump  Hypertension  Dyslipidemia  Paroxysmal Afib, on chronic anticoagulation with Eliquis  Anemia  Hypokalemia    History of Present Illness   Tc Garcia is a 80 year old male with PMHx of hypertension, afib on chronic anticoagulation with Eliquis, hypertension, recurrent L pleural effusion and DM II on an insulin pump who was admitted on 4/25/2020 for management of ABBIE in the setting of diarrhea and complaints of generally feeling unwell over the preceding 3-4 wks.    Hospital Course   Tc Garcia was admitted on 4/25/2020.  The following problems were addressed during his hospitalization:     ABBIE: Improved  Endorsed poor oral intake prior to admission with some associated diarrhea.Cr 3.0 on admission. Presumed pre-renal. No hx of CKD -- Cr 0.98 in 12/2019, had been 1.47 in 2/2020. Given IVFs this stay.     Renal function steadily improved during stay. Oral intake improved as well. Lisinopril and other nephrotoxic meds held. Cr 1.62 on the day of discharge. Appetite improved. Encouraged oral intake. Meds adjusted/stopped as needed as below (based on renal function). Will have BMP drawn 5/4/20 with results to PCP. Continue to adjust meds as needed as renal function improves. Will follow up with PCP at appointment already scheduled for 5/6/20.      Poor Appetite / Oral Intake: Improved   Diarrhea: Resolved  Non-Severe Malnutrition  Endorsed generally feeling unwell for the 3-4 wks preceding admission. Had some associated diarrhea prior to admission. Supportive cares started on admission. COVID19 test was negative. Diarrhea has since resolved. Seen by  nutritionist, found to have non-severe malnutrition -- supplements recommended but patient declined.     Sx initially improved with hydration. Oral intake had significantly improved by the time of discharge. No ongoing diarrhea noted this stay. No PT/OT needs this stay.      Left pleural effusion, s/p thoracentesis on 4/29  Has hx of pleural effusions. CXR on admission showed a moderate left pleural effusion. O2 needs were noted to be increasing with associated dyspnea. Underwent L thoracentesis per IR on 4/29 with removal of 2000 ml dark red fluid. Respiratory status improved following thoracentesis and was weaned off O2.      DM II, on insulin pump  A1C 8.2 this stay.   Manages with insulin pump, metformin and Actos.   Home insulin pump settings:  INSULIN PUMP - OUTPATIENT  Novolog Insulin   BASAL RATES and times:   Midnight to 6am: 0.65 units/hr   6am to 10:30 pm: 0.95 units/hour     10:30pm to midnight: 0.55 units/hr   CARB RATIO: 1 unit per 15 grams   Correction Factor (Sensitivity): 55mg/dL      BLOOD GLUCOSE TARGET and times:   12   AM (midnight): 100 - 140   5:30     AM:  100 - 120   10   PM:  100 - 140   Active Insulin Time:  5 hours   Bolus-Correction 1 unit(s) to lower blood glucose by 55 mg/dL      Checks insulin about 4-5 times a day manually   (Per Rx, pt uses 50-60 units/day)         Oral meds held on admission given presentation with acute kidney injury. Managed with Lantus and sliding scale insulin during stay. Given improvement in renal function this stay, insulin pump was resumed at discharge. Recommend to continue holding her metformin and Actos until renal function improved, has PCP follow up on 5/6/20.       Hypertension  Dyslipidemia  Paroxysmal Afib, on chronic anticoagulation with Eliquis  Last echo in 10/2019 showed EF 60% with severe LA dilation and decreased RV function  PTA meds: amiodarone 200mg daily, lisinopril 40mg daily, verapamil ER 240mg daily, clonidine 0.3mg BID, lovastatin 20mg  daily apixaban 5mg BID    Amiodarone held brielfy this stay but was resumed prior to discharge.   Had an episode of asymptomatic bradycardia this stay but was self limited -- dose of verapimil decreased to 180mg during stay but was increased back to 240mg po daily at discharge  Clonidine continued without changes  Lisinopril remained on hold during stay and at discharge -- resume as renal function allows  Will continue to monitor BP/HR at home  Eliquis dose decreased to 2.5mg BID given poor renal function -- recommended to continue this dose at discharge (okay to split tabs per pharmacy) -- resume 5mg BID dosing once Cr <1.5     Anemia  Baseline hgb appears to be around 10. Hgb as low as 8.8 this stay but no s/sx of bleeding. Could be due in part to dilution dt need for IVFs this stay. Hgb stable at 8 this stay.      Hypokalemia  Given additional potassium replacement this stay. Repeat BMP on 5/4/20 as above.     Kelley Fernandez,     Significant Results and Procedures   Underwent L thoracentesis per IR on 4/29 with removal of 2000 ml dark red fluid.     Code Status   DNR / DNI       Primary Care Physician   Senthil Moya    Physical Exam   Temp: 96.4  F (35.8  C) Temp src: Oral BP: (!) 151/81 Pulse: 74 Heart Rate: 66 Resp: 16 SpO2: 98 % O2 Device: None (Room air)    Vitals:    04/28/20 0554 04/29/20 0554 04/30/20 0617   Weight: 75.8 kg (167 lb 3.2 oz) 77 kg (169 lb 12.8 oz) 72.6 kg (160 lb 1.6 oz)     Vital Signs with Ranges  Temp:  [96.4  F (35.8  C)-98.4  F (36.9  C)] 96.4  F (35.8  C)  Pulse:  [74] 74  Heart Rate:  [66-81] 66  Resp:  [16] 16  BP: (133-172)/(67-94) 151/81  SpO2:  [96 %-100 %] 98 %  I/O last 3 completed shifts:  In: 1440 [P.O.:1440]  Out: -     Constitutional: Resting comfortably, alert and conversing appropriately, NAD  Respiratory: +faint bibasilar crackles but otherwise CTAB, no wheeze/rhonchi, no increased work of breathing  Cardiovascular: HR irregular, no MGR, +bilateral LE edema  to the mid shins  GI: S, NT, ND, +BS  Skin/Integumen: warm/dry    Discharge Disposition   Discharged to home  Condition at discharge: Stable    Consultations This Hospital Stay   PHYSICAL THERAPY ADULT IP CONSULT  OCCUPATIONAL THERAPY ADULT IP CONSULT    Time Spent on this Encounter   IKelley DO, personally saw the patient today and spent greater than 30 minutes discharging this patient.    Discharge Orders      Basic metabolic panel    Results to PCP     Follow-up and recommended labs and tests     You are scheduled for a lab draw at Dr. Moya's office on Monday, May 4th at 9:00 AM.  Follow-up with Dr. Moya as scheduled on May 6th.     Reason for your hospital stay    Evaluation and management of your worsening kidney function. Several of your medications had to be held and other dosages had to be adjusted. Your kidney function improved during your stay but had not yet returned to normal at the time of discharge. You will need to follow up with your PCP and have repeat labs checked on 5/4/20 to reassess your kidney function. Additionally, you were noted to have a left pleural effusion and underwent a repeat thoracentesis this stay.     Activity    Your activity upon discharge: activity as tolerated     Discharge Instructions    Check your heart rate and blood pressure 1-2 times per day and if you feel dizzy or lightheaded.   Several of your medications remain on hold at the time of discharge (including lisinopril, metformin and Actos). As your kidney function improves, your PCP will direct when it is safe to resume these medications.   Your Eliquis dose was decreased due to your kidney injury. Your PCP will direct you when it is safe to resume your normal dosing.     Diet    Follow this diet upon discharge: Regular     Discharge Medications   Current Discharge Medication List      CONTINUE these medications which have CHANGED    Details   apixaban ANTICOAGULANT (ELIQUIS) 5 MG tablet Take 1/2  tablet (2.5mg) by mouth twice daily. You will have your labs checked on 5/4/20 and your PCP will direct you when it is safe to increase your dose back to 1 tablet (5mg) twice daily.    Associated Diagnoses: Chronic atrial fibrillation         CONTINUE these medications which have NOT CHANGED    Details   amiodarone (PACERONE) 200 MG tablet Take 200 mg by mouth daily      cloNIDine (CATAPRES) 0.3 MG tablet Take 0.3 mg by mouth 2 times daily      glucagon 1 MG kit 1 mg as needed for low blood sugar      INSULIN PUMP - OUTPATIENT Novolog Insulin  BASAL RATES and times:  Midnight to 6am: 0.65 units/hr  6am to 10:30 pm: 0.95 units/hour    10:30pm to midnight: 0.55 units/hr  CARB RATIO: 1 unit per 15 grams  Correction Factor (Sensitivity): 55mg/dL  BLOOD GLUCOSE TARGET and times:  12   AM (midnight): 100 - 140  5:30     AM:  100 - 120  10   PM:  100 - 140  Active Insulin Time:  5 hours   Bolus-Correction 1 unit(s) to lower blood glucose by 55 mg/dL  Checks insulin about 4-5 times a day manually  (Per Rx, pt uses 50-60 units/day)      LOVASTATIN 20 MG PO TABS 1 TABLET DAILY AT DINNER  Qty: 90 Tab, Refills: 0    Associated Diagnoses: Mixed hyperlipidemia      tiotropium (SPIRIVA) 18 MCG inhaled capsule Inhale 18 mcg into the lungs daily      triamcinolone (KENALOG) 0.1 % external ointment Apply topically daily as needed for irritation      verapamil ER (VERELAN) 240 MG 24 hr capsule Take 1 capsule (240 mg) by mouth At Bedtime    Associated Diagnoses: Atrial fibrillation, unspecified type (H)         STOP taking these medications       lisinopril (ZESTRIL) 40 MG tablet Comments:   Reason for Stopping:         metFORMIN (GLUCOPHAGE) 500 MG tablet Comments:   Reason for Stopping:         pioglitazone (ACTOS) 30 MG tablet Comments:   Reason for Stopping:             Allergies   Allergies   Allergen Reactions     No Known Drug Allergies      Data   Most Recent 3 CBC's:  Recent Labs   Lab Test 04/30/20  0647 04/27/20  0828  04/26/20  0701 04/25/20  1228 04/16/20  0851  12/30/19  1714   WBC  --   --  8.4 10.5  --   --  13.6*   HGB 8.5* 8.8* 8.4* 10.3*  --   --  12.5*   MCV  --   --  85 84  --   --  86   PLT  --   --  333 383 258   < > 387    < > = values in this interval not displayed.      Most Recent 3 BMP's:  Recent Labs   Lab Test 04/30/20  0647 04/29/20  0700 04/28/20  0733    140 141   POTASSIUM 3.3* 3.3* 3.6   CHLORIDE 109 108 109   CO2 25 26 27   BUN 21 22 26   CR 1.62* 1.75* 2.07*   ANIONGAP 8 6 5   LLOYD 8.3* 8.3* 8.4*   GLC 92 146* 111*     Most Recent 2 LFT's:  Recent Labs   Lab Test 04/25/20  1228 01/07/20  1051 11/18/19   AST 21 16 30   ALT 21 <5* 40   ALKPHOS 79  --  214*   BILITOTAL 0.4  --  1.0     Most Recent INR's and Anticoagulation Dosing History:  Anticoagulation Dose History     Recent Dosing and Labs Latest Ref Rng & Units 10/30/2019 10/31/2019 11/7/2019 12/31/2019 2/26/2020 4/16/2020    INR 0.86 - 1.14 1.40(H) 1.37(H) 1.44(H) 1.65(H) 1.28(H) 1.88(H)        Most Recent 3 Troponin's:  Recent Labs   Lab Test 12/30/19  1714 11/08/19  1525 11/08/19  1120   TROPI 0.027 0.210* 0.266*     Most Recent Cholesterol Panel:No lab results found.  Most Recent 6 Bacteria Isolates From Any Culture (See EPIC Reports for Culture Details):  Recent Labs   Lab Test 12/31/19  1008 11/07/19  1105 10/31/19  2144 10/31/19  2141   CULT No growth 10,000 to 50,000 colonies/mL  Candida albicans / dubliniensis  Candida albicans and Candida dubliniensis are not routinely speciated  Susceptibility testing not routinely done  * No growth No growth     Most Recent TSH, T4 and A1c Labs:  Recent Labs   Lab Test 04/25/20  1228   TSH 4.39*   T4 1.51*   A1C 8.2*     Results for orders placed or performed during the hospital encounter of 04/25/20   XR Chest Port 1 View    Narrative    XR CHEST PORT 1 VW   4/25/2020 1:10 PM     HISTORY: shortness of breath    COMPARISON: 1/24/2020      Impression    IMPRESSION: Moderately displaced posterior right  seventh rib fracture  deformity. No pneumothorax. Moderate left pleural effusion. Left  perihilar and basilar consolidation indeterminate between compressive  atelectasis or pneumonitis. There are multiple left rib fracture  deformities.    ANA M WEST MD   US Thoracentesis    Narrative    EXAM: US THORACENTESIS       4/29/2020 1:33 PM       HISTORY: Left pleural effusion      PROCEDURE:  Written informed consent was obtained from the patient  prior to the procedure. The risks and benefits including bleeding,  infection and pneumothorax were discussed and the patient wished to  continue. Initial ultrasound images demonstrated a left-sided pleural  fluid collection. A permanent image was saved. The skin overlying this  collection was marked, prepped, draped and anesthetized in usual  sterile fashion utilizing 5 mL of lidocaine 1%. Thoracentesis catheter  was then placed into the pleural fluid collection with return of 2000  mL of dark red fluid under ultrasound guidance. Estimated blood loss  during the procedure was less than 5 mL. No specimens collected.  Patient tolerated the procedure well. Followup chest x-ray was  ordered.        Impression    IMPRESSION:  Ultrasound-guided left-sided thoracentesis.     MATTHEW NEWMAN PA-C

## 2020-04-30 NOTE — PLAN OF CARE
VSS on RA, A &O x4, able to communicate needs. Denies pain. Independent. Mod carb diet, good appetite. BG 88 and 234. BS audible and normoactive. R PIV saline locked. Sinus rhythm. LLL sounds diminished. BLE trace edema. K+ 3.3 - replaced w/ one time dose 40meq, recheck 5/1 AM. Voiding w/out problems. Plan to discharge home pending.

## 2020-04-30 NOTE — PROVIDER NOTIFICATION
MD Notification    Notified Person: MD    Notified Person Name: Jim    Notification Date/Time: 4/30/20 0745    Notification Interaction: Paged    Purpose of Notification:K+ 3.3, no replacement protocol in place. Wondering if you would like this replaced    Orders Received: 40mEq one time dose. Recheck 5/1 AM

## 2020-05-04 ENCOUNTER — TELEPHONE (OUTPATIENT)
Dept: MEDSURG UNIT | Facility: CLINIC | Age: 80
End: 2020-05-04

## 2020-05-04 RX ORDER — DEXTROSE MONOHYDRATE 25 G/50ML
25-50 INJECTION, SOLUTION INTRAVENOUS
Status: CANCELLED | OUTPATIENT
Start: 2020-05-04

## 2020-05-04 RX ORDER — NICOTINE POLACRILEX 4 MG
15-30 LOZENGE BUCCAL
Status: CANCELLED | OUTPATIENT
Start: 2020-05-04

## 2020-05-04 NOTE — TELEPHONE ENCOUNTER
COVID Telephone Screen    Step 1  Patient has been called and travel (COVID) screen has been completed. Yes    The patient is negative for symptoms (fever, cough, sore throat, rash): No    If patient is positive for symptoms, do not complete Step 2 and complete Step 3    Step 2  Patient informed of the no visitor policy: Yes  Patient informed to contact ordering provider if the patient develops symptoms (fever, cough, sore throat, rash) prior to procedure (ordering provider to determine if they can still have the procedure or need to reschedule): Yes    If patient is negative for symptoms, STOP here, do not complete Step 3.     Step 3  If the patient is positive for symptoms, consult the ordering provider and/or consult IP to determine if the procedure is deemed necessary or if procedure should be canceled or rescheduled.     If the patient procedure is deemed necessary and the patient is positive for new or worsening symptoms, notify the Manager/Supervisor. The patient should be instructed to call the department and wait by door 2 in their car. A team member in the appropriate PPE will bring a mask to the patient and room the patient directly. The patient will be registered via the phone.   Pt states he is not scheduled for procedure on 5/5 and is now having thoracentesis every month.

## 2020-05-05 ENCOUNTER — HOSPITAL ENCOUNTER (OUTPATIENT)
Facility: CLINIC | Age: 80
Discharge: HOME | End: 2020-05-05
Attending: INTERNAL MEDICINE | Admitting: INTERNAL MEDICINE
Payer: COMMERCIAL

## 2020-05-10 ENCOUNTER — HOSPITAL ENCOUNTER (INPATIENT)
Facility: CLINIC | Age: 80
LOS: 3 days | Discharge: HOME HEALTH | DRG: 286 | End: 2020-05-14
Attending: EMERGENCY MEDICINE | Admitting: HOSPITALIST
Payer: COMMERCIAL

## 2020-05-10 ENCOUNTER — APPOINTMENT (OUTPATIENT)
Dept: GENERAL RADIOLOGY | Facility: CLINIC | Age: 80
DRG: 286 | End: 2020-05-10
Attending: EMERGENCY MEDICINE
Payer: COMMERCIAL

## 2020-05-10 DIAGNOSIS — I27.20 PULMONARY HYPERTENSION (H): ICD-10-CM

## 2020-05-10 DIAGNOSIS — N28.9 RENAL INSUFFICIENCY: ICD-10-CM

## 2020-05-10 DIAGNOSIS — J90 PLEURAL EFFUSION: ICD-10-CM

## 2020-05-10 DIAGNOSIS — R52 PAIN: ICD-10-CM

## 2020-05-10 DIAGNOSIS — I27.20 PULMONARY HYPERTENSION (H): Primary | ICD-10-CM

## 2020-05-10 DIAGNOSIS — E11.9 TYPE 2 DIABETES, HBA1C GOAL < 7% (H): ICD-10-CM

## 2020-05-10 DIAGNOSIS — I50.9 ACUTE ON CHRONIC CONGESTIVE HEART FAILURE, UNSPECIFIED HEART FAILURE TYPE (H): ICD-10-CM

## 2020-05-10 LAB
ALBUMIN SERPL-MCNC: 3.2 G/DL (ref 3.4–5)
ALP SERPL-CCNC: 92 U/L (ref 40–150)
ALT SERPL W P-5'-P-CCNC: 29 U/L (ref 0–70)
ANION GAP SERPL CALCULATED.3IONS-SCNC: 11 MMOL/L (ref 3–14)
AST SERPL W P-5'-P-CCNC: 28 U/L (ref 0–45)
BASOPHILS # BLD AUTO: 0 10E9/L (ref 0–0.2)
BASOPHILS NFR BLD AUTO: 0.2 %
BILIRUB SERPL-MCNC: 1.3 MG/DL (ref 0.2–1.3)
BUN SERPL-MCNC: 18 MG/DL (ref 7–30)
CALCIUM SERPL-MCNC: 8.9 MG/DL (ref 8.5–10.1)
CHLORIDE SERPL-SCNC: 106 MMOL/L (ref 94–109)
CO2 SERPL-SCNC: 24 MMOL/L (ref 20–32)
CREAT SERPL-MCNC: 1.52 MG/DL (ref 0.66–1.25)
DIFFERENTIAL METHOD BLD: ABNORMAL
EOSINOPHIL # BLD AUTO: 0.1 10E9/L (ref 0–0.7)
EOSINOPHIL NFR BLD AUTO: 0.7 %
ERYTHROCYTE [DISTWIDTH] IN BLOOD BY AUTOMATED COUNT: 18.9 % (ref 10–15)
GFR SERPL CREATININE-BSD FRML MDRD: 42 ML/MIN/{1.73_M2}
GLUCOSE SERPL-MCNC: 208 MG/DL (ref 70–99)
HCT VFR BLD AUTO: 30 % (ref 40–53)
HGB BLD-MCNC: 9.5 G/DL (ref 13.3–17.7)
IMM GRANULOCYTES # BLD: 0.1 10E9/L (ref 0–0.4)
IMM GRANULOCYTES NFR BLD: 0.5 %
INR PPP: 1.49 (ref 0.86–1.14)
INTERPRETATION ECG - MUSE: NORMAL
LYMPHOCYTES # BLD AUTO: 1.3 10E9/L (ref 0.8–5.3)
LYMPHOCYTES NFR BLD AUTO: 7.3 %
MCH RBC QN AUTO: 26.7 PG (ref 26.5–33)
MCHC RBC AUTO-ENTMCNC: 31.7 G/DL (ref 31.5–36.5)
MCV RBC AUTO: 84 FL (ref 78–100)
MONOCYTES # BLD AUTO: 1.7 10E9/L (ref 0–1.3)
MONOCYTES NFR BLD AUTO: 9.3 %
NEUTROPHILS # BLD AUTO: 14.9 10E9/L (ref 1.6–8.3)
NEUTROPHILS NFR BLD AUTO: 82 %
NRBC # BLD AUTO: 0 10*3/UL
NRBC BLD AUTO-RTO: 0 /100
NT-PROBNP SERPL-MCNC: 7143 PG/ML (ref 0–1800)
PLATELET # BLD AUTO: 449 10E9/L (ref 150–450)
POTASSIUM SERPL-SCNC: 3.5 MMOL/L (ref 3.4–5.3)
PROT SERPL-MCNC: 6.4 G/DL (ref 6.8–8.8)
RBC # BLD AUTO: 3.56 10E12/L (ref 4.4–5.9)
SODIUM SERPL-SCNC: 141 MMOL/L (ref 133–144)
TROPONIN I SERPL-MCNC: 0.02 UG/L (ref 0–0.04)
WBC # BLD AUTO: 18.1 10E9/L (ref 4–11)

## 2020-05-10 PROCEDURE — 85610 PROTHROMBIN TIME: CPT | Performed by: EMERGENCY MEDICINE

## 2020-05-10 PROCEDURE — 87635 SARS-COV-2 COVID-19 AMP PRB: CPT | Performed by: HOSPITALIST

## 2020-05-10 PROCEDURE — 84145 PROCALCITONIN (PCT): CPT | Performed by: EMERGENCY MEDICINE

## 2020-05-10 PROCEDURE — 93005 ELECTROCARDIOGRAM TRACING: CPT

## 2020-05-10 PROCEDURE — 80053 COMPREHEN METABOLIC PANEL: CPT | Performed by: EMERGENCY MEDICINE

## 2020-05-10 PROCEDURE — 85025 COMPLETE CBC W/AUTO DIFF WBC: CPT | Performed by: EMERGENCY MEDICINE

## 2020-05-10 PROCEDURE — 25000128 H RX IP 250 OP 636: Performed by: EMERGENCY MEDICINE

## 2020-05-10 PROCEDURE — 99285 EMERGENCY DEPT VISIT HI MDM: CPT | Mod: 25

## 2020-05-10 PROCEDURE — 84484 ASSAY OF TROPONIN QUANT: CPT | Performed by: EMERGENCY MEDICINE

## 2020-05-10 PROCEDURE — 83880 ASSAY OF NATRIURETIC PEPTIDE: CPT | Performed by: EMERGENCY MEDICINE

## 2020-05-10 PROCEDURE — 96374 THER/PROPH/DIAG INJ IV PUSH: CPT

## 2020-05-10 PROCEDURE — 85045 AUTOMATED RETICULOCYTE COUNT: CPT | Performed by: EMERGENCY MEDICINE

## 2020-05-10 PROCEDURE — 71045 X-RAY EXAM CHEST 1 VIEW: CPT

## 2020-05-10 PROCEDURE — 86140 C-REACTIVE PROTEIN: CPT | Performed by: EMERGENCY MEDICINE

## 2020-05-10 RX ORDER — FUROSEMIDE 10 MG/ML
40 INJECTION INTRAMUSCULAR; INTRAVENOUS ONCE
Status: COMPLETED | OUTPATIENT
Start: 2020-05-10 | End: 2020-05-10

## 2020-05-10 RX ADMIN — FUROSEMIDE 40 MG: 10 INJECTION, SOLUTION INTRAVENOUS at 22:49

## 2020-05-10 ASSESSMENT — ENCOUNTER SYMPTOMS
COUGH: 0
SHORTNESS OF BREATH: 1
FEVER: 0

## 2020-05-11 ENCOUNTER — APPOINTMENT (OUTPATIENT)
Dept: CT IMAGING | Facility: CLINIC | Age: 80
DRG: 286 | End: 2020-05-11
Attending: HOSPITALIST
Payer: COMMERCIAL

## 2020-05-11 ENCOUNTER — TRANSFERRED RECORDS (OUTPATIENT)
Dept: HEALTH INFORMATION MANAGEMENT | Facility: CLINIC | Age: 80
End: 2020-05-11

## 2020-05-11 ENCOUNTER — APPOINTMENT (OUTPATIENT)
Dept: CARDIOLOGY | Facility: CLINIC | Age: 80
DRG: 286 | End: 2020-05-11
Attending: HOSPITALIST
Payer: COMMERCIAL

## 2020-05-11 LAB
CRP SERPL-MCNC: 39.4 MG/L (ref 0–8)
FERRITIN SERPL-MCNC: 142 NG/ML (ref 26–388)
FOLATE SERPL-MCNC: 10.5 NG/ML
GLUCOSE BLDC GLUCOMTR-MCNC: 91 MG/DL (ref 70–99)
HEMOCCULT STL QL: NEGATIVE
IRON SATN MFR SERPL: 10 % (ref 15–46)
IRON SERPL-MCNC: 16 UG/DL (ref 35–180)
LACTATE BLD-SCNC: 1.3 MMOL/L (ref 0.7–2)
PROCALCITONIN SERPL-MCNC: 0.38 NG/ML
RETICS # AUTO: 92.7 10E9/L (ref 25–95)
RETICS/RBC NFR AUTO: 2.6 % (ref 0.5–2)
SARS-COV-2 PCR COMMENT: NORMAL
SARS-COV-2 RNA SPEC QL NAA+PROBE: NEGATIVE
SARS-COV-2 RNA SPEC QL NAA+PROBE: NORMAL
SPECIMEN SOURCE: NORMAL
SPECIMEN SOURCE: NORMAL
TIBC SERPL-MCNC: 161 UG/DL (ref 240–430)
VIT B12 SERPL-MCNC: 786 PG/ML (ref 193–986)

## 2020-05-11 PROCEDURE — 99223 1ST HOSP IP/OBS HIGH 75: CPT | Mod: 25 | Performed by: INTERNAL MEDICINE

## 2020-05-11 PROCEDURE — 83540 ASSAY OF IRON: CPT | Performed by: HOSPITALIST

## 2020-05-11 PROCEDURE — 00000146 ZZHCL STATISTIC GLUCOSE BY METER IP

## 2020-05-11 PROCEDURE — 83605 ASSAY OF LACTIC ACID: CPT | Performed by: HOSPITALIST

## 2020-05-11 PROCEDURE — 96376 TX/PRO/DX INJ SAME DRUG ADON: CPT

## 2020-05-11 PROCEDURE — 25000132 ZZH RX MED GY IP 250 OP 250 PS 637: Performed by: HOSPITALIST

## 2020-05-11 PROCEDURE — 99223 1ST HOSP IP/OBS HIGH 75: CPT | Mod: AI | Performed by: HOSPITALIST

## 2020-05-11 PROCEDURE — 82728 ASSAY OF FERRITIN: CPT | Performed by: HOSPITALIST

## 2020-05-11 PROCEDURE — 25000128 H RX IP 250 OP 636: Performed by: HOSPITALIST

## 2020-05-11 PROCEDURE — 25500064 ZZH RX 255 OP 636: Performed by: HOSPITALIST

## 2020-05-11 PROCEDURE — 36415 COLL VENOUS BLD VENIPUNCTURE: CPT | Performed by: HOSPITALIST

## 2020-05-11 PROCEDURE — 93306 TTE W/DOPPLER COMPLETE: CPT

## 2020-05-11 PROCEDURE — 71250 CT THORAX DX C-: CPT

## 2020-05-11 PROCEDURE — 82272 OCCULT BLD FECES 1-3 TESTS: CPT | Performed by: HOSPITALIST

## 2020-05-11 PROCEDURE — 83550 IRON BINDING TEST: CPT | Performed by: HOSPITALIST

## 2020-05-11 PROCEDURE — 25000128 H RX IP 250 OP 636: Performed by: EMERGENCY MEDICINE

## 2020-05-11 PROCEDURE — 93306 TTE W/DOPPLER COMPLETE: CPT | Mod: 26 | Performed by: INTERNAL MEDICINE

## 2020-05-11 PROCEDURE — 82746 ASSAY OF FOLIC ACID SERUM: CPT | Performed by: HOSPITALIST

## 2020-05-11 PROCEDURE — 21000001 ZZH R&B HEART CARE

## 2020-05-11 PROCEDURE — 82607 VITAMIN B-12: CPT | Performed by: HOSPITALIST

## 2020-05-11 RX ORDER — FUROSEMIDE 10 MG/ML
40 INJECTION INTRAMUSCULAR; INTRAVENOUS ONCE
Status: COMPLETED | OUTPATIENT
Start: 2020-05-11 | End: 2020-05-11

## 2020-05-11 RX ORDER — ACETAMINOPHEN 325 MG/1
650 TABLET ORAL EVERY 4 HOURS PRN
Status: DISCONTINUED | OUTPATIENT
Start: 2020-05-11 | End: 2020-05-14 | Stop reason: HOSPADM

## 2020-05-11 RX ORDER — CLONIDINE HYDROCHLORIDE 0.1 MG/1
0.3 TABLET ORAL 2 TIMES DAILY
Status: DISCONTINUED | OUTPATIENT
Start: 2020-05-11 | End: 2020-05-14 | Stop reason: HOSPADM

## 2020-05-11 RX ORDER — DEXTROSE MONOHYDRATE 25 G/50ML
25-50 INJECTION, SOLUTION INTRAVENOUS
Status: DISCONTINUED | OUTPATIENT
Start: 2020-05-11 | End: 2020-05-14 | Stop reason: HOSPADM

## 2020-05-11 RX ORDER — NYSTATIN 100000 U/G
CREAM TOPICAL 2 TIMES DAILY PRN
COMMUNITY
End: 2023-01-29

## 2020-05-11 RX ORDER — METOCLOPRAMIDE 5 MG/1
5 TABLET ORAL EVERY 6 HOURS PRN
Status: DISCONTINUED | OUTPATIENT
Start: 2020-05-11 | End: 2020-05-14 | Stop reason: HOSPADM

## 2020-05-11 RX ORDER — ONDANSETRON 2 MG/ML
4 INJECTION INTRAMUSCULAR; INTRAVENOUS EVERY 6 HOURS PRN
Status: DISCONTINUED | OUTPATIENT
Start: 2020-05-11 | End: 2020-05-14 | Stop reason: HOSPADM

## 2020-05-11 RX ORDER — PROCHLORPERAZINE 25 MG
12.5 SUPPOSITORY, RECTAL RECTAL EVERY 12 HOURS PRN
Status: DISCONTINUED | OUTPATIENT
Start: 2020-05-11 | End: 2020-05-14 | Stop reason: HOSPADM

## 2020-05-11 RX ORDER — PROCHLORPERAZINE MALEATE 5 MG
5 TABLET ORAL EVERY 6 HOURS PRN
Status: DISCONTINUED | OUTPATIENT
Start: 2020-05-11 | End: 2020-05-14 | Stop reason: HOSPADM

## 2020-05-11 RX ORDER — FUROSEMIDE 10 MG/ML
40 INJECTION INTRAMUSCULAR; INTRAVENOUS
Status: DISCONTINUED | OUTPATIENT
Start: 2020-05-11 | End: 2020-05-12

## 2020-05-11 RX ORDER — ONDANSETRON 4 MG/1
4 TABLET, ORALLY DISINTEGRATING ORAL EVERY 6 HOURS PRN
Status: DISCONTINUED | OUTPATIENT
Start: 2020-05-11 | End: 2020-05-14 | Stop reason: HOSPADM

## 2020-05-11 RX ORDER — NICOTINE POLACRILEX 4 MG
15-30 LOZENGE BUCCAL
Status: DISCONTINUED | OUTPATIENT
Start: 2020-05-11 | End: 2020-05-14 | Stop reason: HOSPADM

## 2020-05-11 RX ORDER — PIPERACILLIN SODIUM, TAZOBACTAM SODIUM 3; .375 G/15ML; G/15ML
3.38 INJECTION, POWDER, LYOPHILIZED, FOR SOLUTION INTRAVENOUS EVERY 6 HOURS
Status: DISCONTINUED | OUTPATIENT
Start: 2020-05-11 | End: 2020-05-14 | Stop reason: HOSPADM

## 2020-05-11 RX ORDER — AMIODARONE HYDROCHLORIDE 200 MG/1
200 TABLET ORAL DAILY
Status: DISCONTINUED | OUTPATIENT
Start: 2020-05-11 | End: 2020-05-12

## 2020-05-11 RX ORDER — DEXTROSE MONOHYDRATE 25 G/50ML
25-50 INJECTION, SOLUTION INTRAVENOUS
Status: DISCONTINUED | OUTPATIENT
Start: 2020-05-11 | End: 2020-05-11

## 2020-05-11 RX ORDER — POLYETHYLENE GLYCOL 3350 17 G/17G
17 POWDER, FOR SOLUTION ORAL DAILY PRN
Status: DISCONTINUED | OUTPATIENT
Start: 2020-05-11 | End: 2020-05-14 | Stop reason: HOSPADM

## 2020-05-11 RX ORDER — AMOXICILLIN 250 MG
1 CAPSULE ORAL 2 TIMES DAILY PRN
Status: DISCONTINUED | OUTPATIENT
Start: 2020-05-11 | End: 2020-05-14 | Stop reason: HOSPADM

## 2020-05-11 RX ORDER — BISACODYL 10 MG
10 SUPPOSITORY, RECTAL RECTAL DAILY PRN
Status: DISCONTINUED | OUTPATIENT
Start: 2020-05-11 | End: 2020-05-14 | Stop reason: HOSPADM

## 2020-05-11 RX ORDER — METOCLOPRAMIDE HYDROCHLORIDE 5 MG/ML
5 INJECTION INTRAMUSCULAR; INTRAVENOUS EVERY 6 HOURS PRN
Status: DISCONTINUED | OUTPATIENT
Start: 2020-05-11 | End: 2020-05-14 | Stop reason: HOSPADM

## 2020-05-11 RX ORDER — AMOXICILLIN 250 MG
2 CAPSULE ORAL 2 TIMES DAILY PRN
Status: DISCONTINUED | OUTPATIENT
Start: 2020-05-11 | End: 2020-05-14 | Stop reason: HOSPADM

## 2020-05-11 RX ORDER — NALOXONE HYDROCHLORIDE 0.4 MG/ML
.1-.4 INJECTION, SOLUTION INTRAMUSCULAR; INTRAVENOUS; SUBCUTANEOUS
Status: DISCONTINUED | OUTPATIENT
Start: 2020-05-11 | End: 2020-05-14 | Stop reason: HOSPADM

## 2020-05-11 RX ORDER — LOVASTATIN 20 MG
20 TABLET ORAL AT BEDTIME
Status: DISCONTINUED | OUTPATIENT
Start: 2020-05-11 | End: 2020-05-14 | Stop reason: HOSPADM

## 2020-05-11 RX ORDER — VERAPAMIL HYDROCHLORIDE 240 MG/1
240 TABLET, FILM COATED, EXTENDED RELEASE ORAL AT BEDTIME
Status: DISCONTINUED | OUTPATIENT
Start: 2020-05-11 | End: 2020-05-14 | Stop reason: HOSPADM

## 2020-05-11 RX ORDER — NICOTINE POLACRILEX 4 MG
15-30 LOZENGE BUCCAL
Status: DISCONTINUED | OUTPATIENT
Start: 2020-05-11 | End: 2020-05-11

## 2020-05-11 RX ORDER — LIDOCAINE 40 MG/G
CREAM TOPICAL
Status: DISCONTINUED | OUTPATIENT
Start: 2020-05-11 | End: 2020-05-14 | Stop reason: HOSPADM

## 2020-05-11 RX ADMIN — AMIODARONE HYDROCHLORIDE 200 MG: 200 TABLET ORAL at 09:06

## 2020-05-11 RX ADMIN — PIPERACILLIN AND TAZOBACTAM 3.38 G: 3; .375 INJECTION, POWDER, FOR SOLUTION INTRAVENOUS at 12:48

## 2020-05-11 RX ADMIN — FUROSEMIDE 40 MG: 10 INJECTION, SOLUTION INTRAMUSCULAR; INTRAVENOUS at 16:15

## 2020-05-11 RX ADMIN — CLONIDINE HYDROCHLORIDE 0.3 MG: 0.1 TABLET ORAL at 21:33

## 2020-05-11 RX ADMIN — UMECLIDINIUM 1 PUFF: 62.5 AEROSOL, POWDER ORAL at 09:09

## 2020-05-11 RX ADMIN — VERAPAMIL HYDROCHLORIDE 240 MG: 240 TABLET, FILM COATED, EXTENDED RELEASE ORAL at 21:33

## 2020-05-11 RX ADMIN — HUMAN ALBUMIN MICROSPHERES AND PERFLUTREN 9 ML: 10; .22 INJECTION, SOLUTION INTRAVENOUS at 11:42

## 2020-05-11 RX ADMIN — PIPERACILLIN AND TAZOBACTAM 3.38 G: 3; .375 INJECTION, POWDER, FOR SOLUTION INTRAVENOUS at 07:34

## 2020-05-11 RX ADMIN — PIPERACILLIN AND TAZOBACTAM 3.38 G: 3; .375 INJECTION, POWDER, FOR SOLUTION INTRAVENOUS at 23:44

## 2020-05-11 RX ADMIN — FUROSEMIDE 40 MG: 10 INJECTION, SOLUTION INTRAVENOUS at 00:11

## 2020-05-11 RX ADMIN — LOVASTATIN 20 MG: 20 TABLET ORAL at 21:33

## 2020-05-11 RX ADMIN — FUROSEMIDE 40 MG: 10 INJECTION, SOLUTION INTRAMUSCULAR; INTRAVENOUS at 09:08

## 2020-05-11 RX ADMIN — CLONIDINE HYDROCHLORIDE 0.3 MG: 0.1 TABLET ORAL at 09:06

## 2020-05-11 RX ADMIN — PIPERACILLIN AND TAZOBACTAM 3.38 G: 3; .375 INJECTION, POWDER, FOR SOLUTION INTRAVENOUS at 17:59

## 2020-05-11 ASSESSMENT — ACTIVITIES OF DAILY LIVING (ADL)
ADLS_ACUITY_SCORE: 14

## 2020-05-11 NOTE — ED NOTES
[facility]  ED Nurse Handoff Report    ED Chief complaint: Shortness of Breath      ED Diagnosis:   Final diagnoses:   Acute on chronic congestive heart failure, unspecified heart failure type (H)   Pleural effusion   Renal insufficiency       Code Status: as per hospitalist    Allergies:   Allergies   Allergen Reactions     No Known Drug Allergies        Patient Story: Hx of CHF, DM, HTN, HLD, recent medication changes, diuretics discontinued 2 weeks ago. C/o dyspnea on exertion for about 3 days.  Focused Assessment:  Presents with SOB, hypoxic 83% in room air. Currently in 4L of O2 at 97%. Edema in ankles bilaterally. Denies chest pain. Stand by assist.    Labs Ordered and Resulted from Time of ED Arrival Up to the Time of Departure from the ED   CBC WITH PLATELETS DIFFERENTIAL - Abnormal; Notable for the following components:       Result Value    WBC 18.1 (*)     RBC Count 3.56 (*)     Hemoglobin 9.5 (*)     Hematocrit 30.0 (*)     RDW 18.9 (*)     Absolute Neutrophil 14.9 (*)     Absolute Monocytes 1.7 (*)     All other components within normal limits   INR - Abnormal; Notable for the following components:    INR 1.49 (*)     All other components within normal limits   COMPREHENSIVE METABOLIC PANEL - Abnormal; Notable for the following components:    Glucose 208 (*)     Creatinine 1.52 (*)     GFR Estimate 42 (*)     GFR Estimate If Black 49 (*)     Albumin 3.2 (*)     Protein Total 6.4 (*)     All other components within normal limits   NT PROBNP INPATIENT - Abnormal; Notable for the following components:    N-Terminal Pro BNP Inpatient 7,143 (*)     All other components within normal limits   TROPONIN I     XR Chest Port 1 View   Final Result   IMPRESSION: Large left pleural effusion. No pneumothorax. Pulmonary vascular congestion. Obscured cardiac silhouette. Subacute to chronic appearing displaced fractures of the left third and fourth ribs and right sixth rib.            Treatments and/or  interventions provided: Lasix  Patient's response to treatments and/or interventions: Tolerated well.    To be done/followed up on inpatient unit:  Continue with plan of care per admitting MD.    Does this patient have any cognitive concerns?: Forgetful    Activity level - Baseline/Home:  Stand with Assist  Activity Level - Current:   Stand with Assist    Patient's Preferred language: English   Needed?: No    Isolation: Other: COVID precautions  Infection: Other   Bariatric?: No    Vital Signs:   Vitals:    05/10/20 2245 05/10/20 2250 05/10/20 2300 05/10/20 2315   BP: (!) 182/105  (!) 183/87 (!) 179/94   Pulse: 82  82 81   Resp:  21 22 19   Temp:       TempSrc:       SpO2: 97% 98% 99% 100%       Cardiac Rhythm:     Was the PSS-3 completed:   Yes  What interventions are required if any?               Family Comments: Talks to wife with his phone  OBS brochure/video discussed/provided to patient/family: N/A                  For the majority of the shift this patient's behavior was Green.     ED NURSE PHONE NUMBER: [phone]

## 2020-05-11 NOTE — ED NOTES
Bed: ED10  Expected date: 5/10/20  Expected time: 9:45 PM  Means of arrival: Ambulance  Comments:  Sai 535 80M sob x2day

## 2020-05-11 NOTE — PLAN OF CARE
Heart Failure Care Pathway  GOALS TO BE MET BEFORE DISCHARGE:    1. Decrease congestion and/or edema with diuretic therapy to achieve near      optimal volume status.            Dyspnea improved:  No, please explain: same            Edema improved:     No, please explain: same        Net I/O and Weights since admission:          04/11 2300 - 05/11 2259  In: 203 [P.O.:200; I.V.:3]  Out: 3050 [Urine:3050]  Net: -2847            Vitals:    05/11/20 0101 05/11/20 0138   Weight: 72.6 kg (160 lb) 74.3 kg (163 lb 14.4 oz)       2.  O2 sats > 92% on RA or at prior home O2 therapy level.          Current oxygenation status:       SpO2: 92 %         O2 Device: None (Room air),  Oxygen Delivery: 2 LPM         Able to wean O2 this shift to keep sats > 92%:  RA         Does patient use Home O2? No    3.  Tolerates ambulation and mobility near baseline: No, please explain: SOB        How many times did the patient ambulate with nursing staff this shift? 2    Please review the Heart Failure Care Pathway for additional HF goal outcomes.    Patient is up in the room independent, tolerated food, denies pain.     Romelia Luevano RN RN  5/11/2020

## 2020-05-11 NOTE — H&P
[facility]    History and Physical - Hospitalist Service       Date of Admission:  5/10/2020    Assessment & Plan   Tc Garcia is a 80 year old male with a history of chronic kidney disease, diabetes, hypertension, atrial fibrillation on anticoagulation and a persistent left pleural effusion since he suffered a fall last October resulting in multiple rib fractures and hemothorax.  He presented to the emergency department with worsening dyspnea on exertion over the past couple of days.  In the emergency department his blood pressure was elevated.  He was hypoxic and required supplemental oxygen.  Chest x-ray showed a large left pleural effusion.  EKG showed sinus rhythm.  His labs were notable for a BNP of 7000 and troponin 0 0.020.  CRP is 39 and procalcitonin 0.38.  He received 2 doses of IV furosemide in the emergency department.  He is being admitted for further treatment.    Acute respiratory failure, hypoxic   Large recurrent left pleural effusion  ?Pneumonia   The patient was treated with IV furosemide in the emergency department.  Unclear if this is simply congestive heart failure.  His white blood cell count, CRP and procalcitonin are all mildly elevated.  He has no fever cough.  He has a persistent effusion since he fell last year resulting in multiple left-sided rib fractures and hemothorax.  He has undergone 6 thoracenteses.  The last one was about 2 weeks ago.  This showed dark red fluid but was transudate of.  Echocardiogram last fall showed undetermined rhythm with normal LV function, mild to moderately dilated right ventricle with 1-2+ TR.  He takes verapamil and clonidine at home.    Start IV Zosyn    Transthoracic echocardiogram and CT chest    Continue IV furosemide    After the CT scan, would have him undergo another thoracentesis    Consider asking Dr. Rodriguez to evaluate the patient after the CT scan    Consider stress testing    Uncontrolled hypertension   Atrial fibrillation  on apixaban and amiodarone  He is currently in sinus rhythm.    For now continue his amiodarone, verapamil and clonidine    Hold apixaban for thoracentesis    Chronic kidney disease   Stable, monitor    Type 2 diabetes mellitus      He is on an insulin pump which we will continue    Chronic anemia, normocytic  Hemoglobin   Date Value Ref Range Status   05/10/2020 9.5 (L) 13.3 - 17.7 g/dL Final   04/30/2020 8.5 (L) 13.3 - 17.7 g/dL Final   No reported bleeding or black stool.    Check iron studies, B12, folate and stool occult blood     Diet: Combination Diet Low Saturated Fat Na <2400mg Diet, No Caffeine Diet    DVT Prophylaxis: Pneumatic Compression Devices  Pruett Catheter: not present  Code Status: DNR      Disposition Plan   Expected discharge: 2 - 3 days, recommended to prior living arrangement once respiratory status is stable .  Entered: León Roach MD 05/11/2020, 2:22 AM     The patient's care was discussed with the Patient.    León Roach MD  [facility] [place]    ______________________________________________________________________    Chief Complaint   Shortness of breath     History is obtained from the patient, electronic health record and emergency department physician    History of Present Illness   Tc Garcia is a 80 year old male who has a history of type 2 diabetes, chronic kidney disease hypertension and atrial fibrillation for which he is anticoagulated with apixaban.  Last October he had a fall which resulted in multiple left-sided rib fractures and hemothorax.  This required chest tube placement.  Since then he has had a persistent left pleural effusion and is undergone 6 thoracenteses.  Last time was on April 29.  2 L of dark red fluid were removed.  The fluid was transudate.  Today the patient presented to the [facility] emergency department complaining of increasing dyspnea on exertion.  He has not had fever, cough, chills, chest pain or significant dyspnea at  rest.  He is noted some lower extremity edema.  In the emergency room his initial blood pressure was 212/102, temperature 98.2  F, heart rate 93, respiratory rate 25 and oxygen saturation 94%.  He had diminished breath sounds at the left base.  He had 2+ lower extremity edema.  EKG showed sinus rhythm.  Chest x-ray showed a large left pleural effusion.  Labs notable for white count 18,000 troponin 0 0.020 hemoglobin 9.5 g creatinine 1.5 and INR 1.5.  He was given 40 mg of IV furosemide x2 doses.    Review of Systems    The 10 point Review of Systems is negative other than noted in the HPI or here.     Past Medical History    I have reviewed this patient's medical history and updated it with pertinent information if needed.   Past Medical History:   Diagnosis Date     ABBIE (acute kidney injury) (H) 11/2019    due to ATN      Anemia      Atrial fibrillation (H)      CKD (chronic kidney disease) stage 3, GFR 30-59 ml/min (H)      Diabetic PROTEINURIA 2003     Fall 11/2019    suffered multiple left rib fractures, C3 and T2 fractures     Mixed hyperlipidemia 2003     Personal history of colonic polyps 1972    gets colonoscopy every five years, due in 2006     Pleural effusion on left 11/2019    after multiple rib fractures     Rosacea 1989     Type II or unspecified type diabetes mellitus without mention of complication, not stated as uncontrolled 1999     Unspecified essential hypertension 1994       Past Surgical History   I have reviewed this patient's surgical history and updated it with pertinent information if needed.  Past Surgical History:   Procedure Laterality Date     ANESTHESIA CARDIOVERSION N/A 2/3/2020    Procedure: ANESTHESIA, FOR CARDIOVERSION;  Surgeon: GENERIC ANESTHESIA PROVIDER;  Location:  OR      REMOVE TONSILS/ADENOIDS,<13 Y/O      T & A <12y.o.       Social History   I have reviewed this patient's social history and updated it with pertinent information if needed.  Social History     Tobacco  Use     Smoking status: Former Smoker     Packs/day: 0.20     Years: 40.00     Pack years: 8.00     Types: Cigarettes     Last attempt to quit: 2008     Years since quittin.3     Smokeless tobacco: Never Used     Tobacco comment: occasional   Substance Use Topics     Alcohol use: Not Currently     Alcohol/week: 35.0 standard drinks     Drug use: Not Currently       Family History   I have reviewed this patient's family history and updated it with pertinent information if needed.   Family History   Problem Relation Age of Onset     Family History Negative Mother          age 71     Family History Negative Father          age 70     Diabetes Brother         alive age 77     Diabetes Brother         alive age 76     Family History Negative Brother              Family History Negative Brother              Diabetes Sister         alive age76     Family History Negative Sister          age 86     Heart Disease Sister              Family History Negative Sister              Family History Negative Sister              Diabetes Sister      Diabetes Sister      Diabetes Brother      Diabetes Brother        Prior to Admission Medications   Prior to Admission Medications   Prescriptions Last Dose Informant Patient Reported? Taking?   INSULIN PUMP - OUTPATIENT  Self Yes No   Sig: Novolog Insulin  BASAL RATES and times:  Midnight to 6am: 0.65 units/hr  6am to 10:30 pm: 0.95 units/hour    10:30pm to midnight: 0.55 units/hr  CARB RATIO: 1 unit per 15 grams  Correction Factor (Sensitivity): 55mg/dL  BLOOD GLUCOSE TARGET and times:  12   AM (midnight): 100 - 140  5:30     AM:  100 - 120  10   PM:  100 - 140  Active Insulin Time:  5 hours   Bolus-Correction 1 unit(s) to lower blood glucose by 55 mg/dL  Checks insulin about 4-5 times a day manually  (Per Rx, pt uses 50-60 units/day)   LOVASTATIN 20 MG PO TABS  Self No No   Si TABLET DAILY AT DINNER    amiodarone (PACERONE) 200 MG tablet  Self Yes No   Sig: Take 200 mg by mouth daily   apixaban ANTICOAGULANT (ELIQUIS) 5 MG tablet   No No   Sig: Take 1/2 tablet (2.5mg) by mouth twice daily. You will have your labs checked on 20 and your PCP will direct you when it is safe to increase your dose back to 1 tablet (5mg) twice daily.   cloNIDine (CATAPRES) 0.3 MG tablet  Self Yes No   Sig: Take 0.3 mg by mouth 2 times daily   glucagon 1 MG kit  Self Yes No   Si mg as needed for low blood sugar   tiotropium (SPIRIVA) 18 MCG inhaled capsule  Self Yes No   Sig: Inhale 18 mcg into the lungs daily   triamcinolone (KENALOG) 0.1 % external ointment  Self Yes No   Sig: Apply topically daily as needed for irritation   verapamil ER (VERELAN) 240 MG 24 hr capsule  Self No No   Sig: Take 1 capsule (240 mg) by mouth At Bedtime      Facility-Administered Medications: None     Allergies   Allergies   Allergen Reactions     No Known Drug Allergies        Physical Exam   Vital Signs: Temp: 98.6  F (37  C) Temp src: Oral BP: (!) 176/103 Pulse: 89 Heart Rate: 94 Resp: 22 SpO2: 92 % O2 Device: Nasal cannula Oxygen Delivery: 2 LPM  Weight: 163 lbs 14.4 oz    Constitutional: awake, alert, cooperative, no apparent distress  Eyes: Lids and lashes normal, pupils equal, round, sclera clear, conjunctiva normal  ENT: Normocephalic, without obvious abnormality, atraumatic  Respiratory: No increased work of breathing, nearly absent breath sounds group home up the left side posteriorly.  No wheezing rales or rhonchi heard.  Cardiovascular:regular rate and rhythm, normal S1 and S2, no S3 or S4, and no murmur noted; 1+ bilateral  Lower extremity  edema  GI:  normal bowel sounds, soft, non-distended, non-tender, no masses palpated  Skin: no rashes  Musculoskeletal: There is no redness, warmth, or swelling of the joints.  Full range of motion noted.  Motor strength is 5 out of 5 all extremities bilaterally.  Tone is normal.  Neurologic: Awake,  alert, oriented to name, place and time.  Cranial nerves II-XII are grossly intact.  Motor is 5 out of 5 bilaterally.   Neuropsychiatric: General: normal, calm and normal eye contact    Data   Data reviewed today: I reviewed all medications, new labs and imaging results over the last 24 hours. I personally reviewed the chest x-ray image(s) showing a large left pleural effusion .  EKG shows sinus rhythm with nonspecific T wave abnormalities.    Recent Labs   Lab 05/10/20  2207   WBC 18.1*   HGB 9.5*   MCV 84      INR 1.49*      POTASSIUM 3.5   CHLORIDE 106   CO2 24   BUN 18   CR 1.52*   ANIONGAP 11   LLOYD 8.9   *   ALBUMIN 3.2*   PROTTOTAL 6.4*   BILITOTAL 1.3   ALKPHOS 92   ALT 29   AST 28   TROPI 0.020     Recent Results (from the past 24 hour(s))   XR Chest Port 1 View    Narrative    EXAM: XR CHEST PORT 1 VW  LOCATION: [facility]  DATE/TIME: 5/10/2020 10:26 PM    INDICATION: Shortness of breath  COMPARISON: None.      Impression    IMPRESSION: Large left pleural effusion. No pneumothorax. Pulmonary vascular congestion. Obscured cardiac silhouette. Subacute to chronic appearing displaced fractures of the left third and fourth ribs and right sixth rib.

## 2020-05-11 NOTE — PLAN OF CARE
Heart Failure Care Pathway  GOALS TO BE MET BEFORE DISCHARGE:    1. Decrease congestion and/or edema with diuretic therapy to achieve near      optimal volume status.            Dyspnea improved:  No, please explain: LUCIANO            Edema improved:     No, please explain: +2 BLE edema        Net I/O and Weights since admission:          04/11 2300 - 05/11 2259  In: 443 [P.O.:440; I.V.:3]  Out: 3550 [Urine:3550]  Net: -3107            Vitals:    05/11/20 0101 05/11/20 0138   Weight: 72.6 kg (160 lb) 74.3 kg (163 lb 14.4 oz)       2.  O2 sats > 92% on RA or at prior home O2 therapy level.          Current oxygenation status:       SpO2: 92 %         O2 Device: None (Room air),  Oxygen Delivery: 2 LPM         Able to wean O2 this shift to keep sats > 92%:  Yes       Does patient use Home O2? No    3.  Tolerates ambulation and mobility near baseline: No, please explain: LUCIANO        How many times did the patient ambulate with nursing staff this shift? 1    Please review the Heart Failure Care Pathway for additional HF goal outcomes.    Independent in room, frequent voiding, On IV lasix, intermittent abx. LS diminished. Cards and pulmonology following. Plan for Left Tunneled pleural catheter to be placed by IR tomorrow 5/12/2020.     Heaven Buckley RN  5/11/2020

## 2020-05-11 NOTE — CONSULTS
Thoracic Surgery Consult Note:    Tc Garcia  1940   [id number]    Date of Admission: 5/10/2020  9:57 PM  Date of Consult: 5/11/2020     CC: large recurrent left pleural effusion and dyspnea    Referring Provider: Dr. León Roach    Consulting Thoracic Surgeon: Dr. Warren Rodriguez    ASSESSMENT/PLAN:   1) Recurrent symptomatic left pleural effusion, s/p traumatic hemothorax with multiple rib fractures and hemothorax 11/20/19: patient now admitted with severe dyspnea and large recurrent left pleural effusion. Transudate in past and most likely currently. Looks likely this is multifactorial, with singnificant element of acute on chronic congestive heart failure (BNP 7143) in addition to reaccummulation of large effusion on left s/p left hemothorax. No evidence this is recurrent hemothorax nor empyema. With WBC of 18K and procalcitonin of 0.38 and RUL airspace consolidation on CT chest question PNA so is on IV Zosyn. Patient just had bedside MANPREET completed so awaiting results. Has already undergone 6 left thoracenteses with temporary relief of dyspnea. Currently on room air. Took last dose of Eliquis for a fib on Saturday evening. Briefly discussed with patient the possibility of surgical intervention (talc pleurodesis) but Dr. Rodriguez is not recommending surgical approach due to poor results when effusion is transudate. Recommends prn thoracenteses for now. Called to discuss with Dr. Sylvester. Cardiology consult would be helpful to clarify role of CHF and medical optimization.    History of the Present Illness: Pt is a 80 year old male admitted for the treatment of Pleural effusion [J90]  Renal insufficiency [N28.9]and Acute on chronic congestive heart failure, unspecified heart failure type (H) [I50.9].  Thoracic Surgery Consult was requested for evaluation of and recommendations regarding recurrent symptomatic left pleural effusion.    Tc is an 80-year-old gentleman with past medical history of atrial  fibrillation on apixaban and amiodarone, uncontrolled htn, CKD, DM, and past history of a traumatic fall and multiple left rib fractures with large left hemothorax treated with chest tube last November. Since November, he has undergone six US-guided left thoracenteses for recurrent symptomatic left pleural effusions Cytology from 12/32/19 pleural fluid cytology negative for malignancy and transudate. Typically, IR would drain approximately 2000 ml clear/straw-colored fluid and patient would experience transient relief of SOB that would recur within 4 weeks. Most recently, on 4/29 he was tapped for 2000 dark red fluid.     Tc felt increasingly dyspneic to the point he could not ambulate more than about 10 feet without rest and thus he presented to the ED yesterday. No fever, cough, chest pain. In the ED, sats were 83% on room air initially, temp 98.2. WBC was 18.1, Hgb 9.5,  procalcitonin 0.38., BNP 7143, CRP 39.4, Creat 1.52, INR 1.49. CXR showed a large left effusion and CT chest showed large left pleural effusion with right uper lobe airspace abnormalities/consolidation suspicious for PNA. He was given IV Lasix and admitted. He currently is receiving IV Zosyn for possible right lung PNA and is on room air.  Is undergoing echo during our conversation.     ROS: A 12-point review of systems was performed and negative except as noted in the HPI above.     Past Medical History:  Past Medical History:   Diagnosis Date     ABBIE (acute kidney injury) (H) 11/2019    due to ATN      Anemia      Atrial fibrillation (H)      CKD (chronic kidney disease) stage 3, GFR 30-59 ml/min (H)      Diabetic PROTEINURIA 2003     Fall 11/2019    suffered multiple left rib fractures, C3 and T2 fractures     Mixed hyperlipidemia 2003     Personal history of colonic polyps 1972    gets colonoscopy every five years, due in 2006     Pleural effusion on left 11/2019    after multiple rib fractures     Rosacea 1989     Type II or unspecified  type diabetes mellitus without mention of complication, not stated as uncontrolled      Unspecified essential hypertension         Past Surgical History:  Past Surgical History:   Procedure Laterality Date     ANESTHESIA CARDIOVERSION N/A 2/3/2020    Procedure: ANESTHESIA, FOR CARDIOVERSION;  Surgeon: GENERIC ANESTHESIA PROVIDER;  Location:  OR      REMOVE TONSILS/ADENOIDS,<11 Y/O      T & A <11y.o.       Family History:  Family History   Problem Relation Age of Onset     Family History Negative Mother          age 71     Family History Negative Father          age 70     Diabetes Brother         alive age 77     Diabetes Brother         alive age 76     Family History Negative Brother              Family History Negative Brother              Diabetes Sister         alive age76     Family History Negative Sister          age 86     Heart Disease Sister              Family History Negative Sister              Family History Negative Sister              Diabetes Sister      Diabetes Sister      Diabetes Brother      Diabetes Brother        Medications:  No current outpatient medications on file.       S: On room air, currently lying in bed undergoing MANPREET. Alert and oriented, pleasant. Curious about possible surgical intervention.    O: BP (!) 148/78   Pulse 89   Temp 98.6  F (37  C) (Oral)   Resp 22   Wt 74.3 kg (163 lb 14.4 oz)   SpO2 92%   BMI 24.20 kg/m   on room air    Imaging:  Recent Results (from the past 48 hour(s))   XR Chest Port 1 View    Narrative    EXAM: XR CHEST PORT 1 VW  LOCATION: [facility]  DATE/TIME: 5/10/2020 10:26 PM    INDICATION: Shortness of breath  COMPARISON: None.      Impression    IMPRESSION: Large left pleural effusion. No pneumothorax. Pulmonary vascular congestion. Obscured cardiac silhouette. Subacute to chronic appearing displaced fractures of the left third and fourth ribs and right sixth  rib.   CT Chest w/o Contrast    Narrative    CT CHEST W/O CONTRAST 5/11/2020 8:28 AM    CLINICAL HISTORY: recurrent left pleural effusion    TECHNIQUE: CT chest without IV contrast. Multiplanar reformats were  obtained. Dose reduction techniques were used.  CONTRAST: None.    COMPARISON: 10/30/2019    FINDINGS:   LUNGS AND PLEURA: Small right and large left pleural effusions of  water attenuation. Passive atelectasis in both lungs. Areas of  groundglass attenuation and air space consolidation in a  peribronchovascular distribution in the right upper lobe. Calcified  bilateral pleural plaques, suggesting prior asbestos exposure.  Calcified granuloma left lower lobe.    MEDIASTINUM/AXILLAE: Severe calcified three-vessel coronary  atherosclerosis and/or stents. Hypoattenuation of blood pool relative  to myocardium, suggesting anemia. Calcified left hilar and subcarinal  lymph nodes.    UPPER ABDOMEN: Calcified splenic and hepatic granulomas.    MUSCULOSKELETAL: Old fractures of the sternum and multiple bilateral  ribs. Moderate degenerative change in the spine.      Impression    IMPRESSION:   1.  Large left and small right pleural effusions of water density  2.  Airspace abnormalities in the right upper lobe are nonspecific and  can be seen with infection or aspiration pneumonia.    RAMÓN HERNANDEZ MD         Discussed the above plan of care with patient and RN today. Orders left.  We will continue to follow with you.  Thank you for allowing us to participate in the care of this patient and please call if there are further questions or concerns.    Time dedicated to Consultation: 30  minutes were spent at bedside, floor and unit with greater than 50% of the time spent on patient counseling and education, radiology review and coordination of care.    Estrella Giraldo PA-C with Dr. Warren Rodriguez  [facility]  Cell [phone]

## 2020-05-11 NOTE — PHARMACY-ADMISSION MEDICATION HISTORY
Pharmacy Medication History  Admission medication history interview status for the 5/10/2020  admission is complete. See EPIC admission navigator for prior to admission medications     Medication history sources: Patient & Surescripts  Medication history source reliability: Good  Adherence assessment: Good    Significant changes made to the medication list:  Removed: Triamcinolone  Added: Nystatin cream      Additional medication history information:   None    Medication reconciliation completed by provider prior to medication history? Yes    Time spent in this activity: 15 min      Prior to Admission medications    Medication Sig Last Dose Taking? Auth Provider   amiodarone (PACERONE) 200 MG tablet Take 200 mg by mouth daily 5/10/2020 at Unknown time Yes Unknown, Entered By History   apixaban ANTICOAGULANT (ELIQUIS) 5 MG tablet Take 1/2 tablet (2.5mg) by mouth twice daily. You will have your labs checked on 5/4/20 and your PCP will direct you when it is safe to increase your dose back to 1 tablet (5mg) twice daily. 5/10/2020 at Unknown time Yes Kelley Fernandez,    cloNIDine (CATAPRES) 0.3 MG tablet Take 0.3 mg by mouth 2 times daily 5/10/2020 at Unknown time Yes Unknown, Entered By History   glucagon 1 MG kit 1 mg as needed for low blood sugar  Yes Unknown, Entered By History   INSULIN PUMP - OUTPATIENT Novolog Insulin  BASAL RATES and times:  Midnight to 6am: 0.65 units/hr  6am to 10:30 pm: 0.95 units/hour    10:30pm to midnight: 0.55 units/hr  CARB RATIO: 1 unit per 15 grams  Correction Factor (Sensitivity): 55mg/dL  BLOOD GLUCOSE TARGET and times:  12   AM (midnight): 100 - 140  5:30     AM:  100 - 120  10   PM:  100 - 140  Active Insulin Time:  5 hours   Bolus-Correction 1 unit(s) to lower blood glucose by 55 mg/dL  Checks insulin about 4-5 times a day manually  (Per Rx, pt uses 50-60 units/day) 5/11/2020 at Unknown time Yes Unknown, Entered By History   LOVASTATIN 20 MG PO TABS 1 TABLET DAILY AT  DINNER 5/10/2020 at Unknown time Yes Tc Palacios MD   nystatin (MYCOSTATIN) [id number] UNIT/GM external cream Apply topically 2 times daily 5/10/2020 at Unknown time Yes Unknown, Entered By History   tiotropium (SPIRIVA) 18 MCG inhaled capsule Inhale 18 mcg into the lungs daily 5/10/2020 at Unknown time Yes Unknown, Entered By History   verapamil ER (VERELAN) 240 MG 24 hr capsule Take 1 capsule (240 mg) by mouth At Bedtime 5/10/2020 at Unknown time Yes Quinton Camejo MD

## 2020-05-11 NOTE — CONSULTS
[facility]    Cardiology Consultation     Date of Admission:  5/10/2020    Assessment & Plan   Tc Garcia is a 80 year old male who was admitted on 5/10/2020.    1.  Recurrent left pleural effusion requiring thoracentesis  Moderate  pulmonary hypertension with RV dysfunction on recent echo   ?Heart failure with preserved ejection fraction  Patient started having left pleural effusion after his mechanical fall and left rib fractures and hemothorax.  It is possible the left effusions are reactive in nature.  However, recent echocardiogram shows elevated RV systolic pressure suggestive of pulmonary hypertension as well as RV dilatation and dysfunction and tricuspid regurgitation.  Therefore, I would like to rule out pulmonary hypertension as cause of his recurrent pleural effusion.  I would recommend doing right heart catheterization with possible vasodilator study.  If wedge pressure is normal, we can do nitrous oxide challenge versus nitroprusside challenge the wedge pressure is elevated.  He also has elevated N-terminal proBNP.  Right heart catheterization will also assist to rule out heart failure with preserved ejection fraction.  If there is no significant pulmonary hypertension, he may require repeat thoracentesis and perhaps pleurodesis.  One other option may be to put in a tunnel catheter and drain it every few days.  I discussed that with the pulmonologist who may be entertaining this idea and will talk to the interventional radiologist.  If there is no significant pulmonary hypertension, we may have to do a transesophageal echocardiogram or cardiac MRI without contrast to assess for tricuspid regurgitation and its severity.    2.  Recurrent or paroxysmal atrial fibrillation and flutter  On apixaban for stroke prophylaxis.  He is also on amiodarone for rhythm control  TSH appears to be mildly elevated.  May need to reevaluate continuation of amiodarone.  May consult EP.    3.  Chronic renal  insufficiency, creatinine stable at 1.5-1.6    4.  Anemia, likely of chronic disease but being worked up.  B12 and folate levels were normal.    5.  Hypertension  Continue present meds.  There may be opportunity to optimize his medications depending on his heart rate and blood pressures here.    Apixaban on hold since Sunday. Consider RHC once thoracentesis is done.     Ariel Collins MD    Primary Care Physician   Senthil Moya    Reason for Consult   Reason for consult: I was asked by hospitalist to evaluate this patient for recurrent left pleural effusion.    History of Present Illness   Mr. Garcia is a pleasant 80-year-old gentleman who had a recent prolonged hospitalization at the end of October 2019,discharged on 11/10.  He sustained a very complicated mechanical fall with C3 spinous process fracture, T2 fracture, extensive ecchymosis left rib fracture associated with a hemothorax.  This was all complicated by acute renal failure, acute liver failure, possible aspiration pneumonia, anemia, delirium related to alcohol abuse.  He developed atrial fibrillation while he was in the hospital; however, this had been diagnosed apparently 2 months prior by his primary and he was placed on Eliquis.    Subsequently, he has been having issues with left pleural effusion.  He has had thoracentesis 4 times now.  His fluid has been transudate and cytology has been negative.    He also has paroxysmal atrial fibrillation and flutter.  He was put on amiodarone by Dr. Camejo in January.  He is on Eliquis.  In April, on the 25th, his EKG showed atrial flutter.  EKG this admission is difficult because of wavy baseline but likely sinus.    His echoes in the past have shown normal diastolic function.  Echocardiogram done today reveals normal LV study function again but the RV function appears moderately reduced with moderate dilatation of RV.  There is D-shaped left ventricle.  RV systolic pressure is increased at 47+ right  atrial pressure which is new compared to prior studies.  In addition, tricuspid vegetation appears more than mild.    We were consulted because of his shortness of breath and recurrent left pleural effusion associated with elevated N-terminal proBNP.  In the past his BNP was elevated as high as 3000 range and now it is 7149    He has been started on IV Lasix.    His COVID test was negative in April and has been sent again and is pending.    He also has had bradycardia arrhythmias in the past.  However, he is tolerating verapamil, amiodarone and clonidine now for his high blood pressure.  His TSH this admission is been slightly elevated at 4.89 but free T4 also is elevated 1.54, likely from amiodarone.        Patient Active Problem List   Diagnosis     Diabetes mellitus, type 2 (H)     Mixed hyperlipidemia     Essential hypertension     Pain in limb     Plantar fascial fibromatosis     TYPE 2 DIABETES, HBA1C GOAL < 7%     HYPERLIPIDEMIA LDL GOAL <100     Hemothorax on left     Pleural effusion     ABBIE (acute kidney injury) (H)     Acute respiratory failure with hypoxia (H)       Past Medical History   I have reviewed this patient's medical history and updated it with pertinent information if needed.   Past Medical History:   Diagnosis Date     ABBIE (acute kidney injury) (H) 11/2019    due to ATN      Anemia      Atrial fibrillation (H)      CKD (chronic kidney disease) stage 3, GFR 30-59 ml/min (H)      Diabetic PROTEINURIA 2003     Fall 11/2019    suffered multiple left rib fractures, C3 and T2 fractures     Mixed hyperlipidemia 2003     Personal history of colonic polyps 1972    gets colonoscopy every five years, due in 2006     Pleural effusion on left 11/2019    after multiple rib fractures     Rosacea 1989     Type II or unspecified type diabetes mellitus without mention of complication, not stated as uncontrolled 1999     Unspecified essential hypertension 1994       Past Surgical History   I have reviewed this  patient's surgical history and updated it with pertinent information if needed.  Past Surgical History:   Procedure Laterality Date     ANESTHESIA CARDIOVERSION N/A 2/3/2020    Procedure: ANESTHESIA, FOR CARDIOVERSION;  Surgeon: GENERIC ANESTHESIA PROVIDER;  Location:  OR     HC REMOVE TONSILS/ADENOIDS,<11 Y/O      T & A <12y.o.       Prior to Admission Medications   Prior to Admission Medications   Prescriptions Last Dose Informant Patient Reported? Taking?   INSULIN PUMP - OUTPATIENT 2020 at Unknown time Self Yes Yes   Sig: Novolog Insulin  BASAL RATES and times:  Midnight to 6am: 0.65 units/hr  6am to 10:30 pm: 0.95 units/hour    10:30pm to midnight: 0.55 units/hr  CARB RATIO: 1 unit per 15 grams  Correction Factor (Sensitivity): 55mg/dL  BLOOD GLUCOSE TARGET and times:  12   AM (midnight): 100 - 140  5:30     AM:  100 - 120  10   PM:  100 - 140  Active Insulin Time:  5 hours   Bolus-Correction 1 unit(s) to lower blood glucose by 55 mg/dL  Checks insulin about 4-5 times a day manually  (Per Rx, pt uses 50-60 units/day)   LOVASTATIN 20 MG PO TABS 5/10/2020 at Unknown time Self No Yes   Si TABLET DAILY AT DINNER   amiodarone (PACERONE) 200 MG tablet 5/10/2020 at Unknown time Self Yes Yes   Sig: Take 200 mg by mouth daily   apixaban ANTICOAGULANT (ELIQUIS) 5 MG tablet 5/10/2020 at Unknown time Self No Yes   Sig: Take 1/2 tablet (2.5mg) by mouth twice daily. You will have your labs checked on 20 and your PCP will direct you when it is safe to increase your dose back to 1 tablet (5mg) twice daily.   cloNIDine (CATAPRES) 0.3 MG tablet 5/10/2020 at Unknown time Self Yes Yes   Sig: Take 0.3 mg by mouth 2 times daily   glucagon 1 MG kit  Self Yes Yes   Si mg as needed for low blood sugar   nystatin (MYCOSTATIN) 882516 UNIT/GM external cream 5/10/2020 at Unknown time Self Yes Yes   Sig: Apply topically 2 times daily   tiotropium (SPIRIVA) 18 MCG inhaled capsule 5/10/2020 at Unknown time Self Yes Yes    Sig: Inhale 18 mcg into the lungs daily   verapamil ER (VERELAN) 240 MG 24 hr capsule 5/10/2020 at Unknown time Self No Yes   Sig: Take 1 capsule (240 mg) by mouth At Bedtime      Facility-Administered Medications: None     Current Facility-Administered Medications   Medication Dose Route Frequency     amiodarone  200 mg Oral Daily     cloNIDine  0.3 mg Oral BID     furosemide  40 mg Intravenous BID     insulin aspart   Device See Admin Instructions     insulin bolus from AMBULATORY PUMP   Subcutaneous 4x Daily AC & HS     insulin bolus from AMBULATORY PUMP   Subcutaneous TID AC     lovastatin  20 mg Oral At Bedtime     piperacillin-tazobactam  3.375 g Intravenous Q6H     sodium chloride (PF)  3 mL Intracatheter Q8H     umeclidinium  1 puff Inhalation Daily     verapamil ER  240 mg Oral At Bedtime     Current Facility-Administered Medications   Medication Last Rate     insulin basal rate for inpatient ambulatory pump 0.4 Units/hr (20 0147)     - MEDICATION INSTRUCTIONS -       Allergies   Allergies   Allergen Reactions     No Known Drug Allergies        Social History    reports that he quit smoking about 12 years ago. His smoking use included cigarettes. He has a 8.00 pack-year smoking history. He has never used smokeless tobacco. He reports previous alcohol use of about 35.0 standard drinks of alcohol per week. He reports previous drug use.    Family History   Family History   Problem Relation Age of Onset     Family History Negative Mother          age 71     Family History Negative Father          age 70     Diabetes Brother         alive age 77     Diabetes Brother         alive age 76     Family History Negative Brother              Family History Negative Brother              Diabetes Sister         alive age74     Family History Negative Sister          age 86     Heart Disease Sister              Family History Negative Sister               Family History Negative Sister              Diabetes Sister      Diabetes Sister      Diabetes Brother      Diabetes Brother        Review of Systems   The comprehensive 10 point Review of Systems is negative other than noted in the HPI or here.     Physical Exam   Vital Signs with Ranges  Temp:  [98.2  F (36.8  C)-98.6  F (37  C)] 98.6  F (37  C)  Pulse:  [] 89  Heart Rate:  [] 94  Resp:  [19-36] 22  BP: (148-212)/() 148/78  SpO2:  [89 %-100 %] 94 %  Wt Readings from Last 4 Encounters:   20 74.3 kg (163 lb 14.4 oz)   20 72.6 kg (160 lb 1.6 oz)   20 72.6 kg (160 lb)   20 74.8 kg (165 lb)     I/O last 3 completed shifts:  In: -   Out: 1650 [Urine:1650]      Vitals: BP (!) 148/78   Pulse 89   Temp 98.6  F (37  C) (Oral)   Resp 22   Wt 74.3 kg (163 lb 14.4 oz)   SpO2 94%   BMI 24.20 kg/m      Constitutional:   alert   Eyes:   extra-ocular muscles intact   ENT:   atramatic   Neck:   no jugular venous distension, mild HJR   Hematologic / Lymphatic:   no cervical lymphadenopathy   Back:   symmetric   Lungs:   Decreased AE left lung fileds   Cardiovascular:   normal S1 and S2 and no murmur noted   Abdomen:   non-distended and non-tender     Neurologic:   Motor Exam:  moves all extremities well and symmetrically   Neuropsychiatric:   Orientation: oriented to self, place, time and situation   Skin:   no lesions       Recent Labs   Lab 05/10/20  2207   TROPI 0.020       Recent Labs   Lab 05/10/20  2207   WBC 18.1*   HGB 9.5*   MCV 84      INR 1.49*      POTASSIUM 3.5   CHLORIDE 106   CO2 24   BUN 18   CR 1.52*   GFRESTIMATED 42*   GFRESTBLACK 49*   ANIONGAP 11   LLOYD 8.9   *   ALBUMIN 3.2*   PROTTOTAL 6.4*   BILITOTAL 1.3   ALKPHOS 92   ALT 29   AST 28   TROPI 0.020     No results for input(s): CHOL, HDL, LDL, TRIG, CHOLHDLRATIO in the last 11932 hours.  Recent Labs   Lab 20  0542 05/10/20  9580   WBC  --  18.1*   HGB  --  9.5*   HCT  --  30.0*    MCV  --  84   PLT  --  449   IRON 16*  --    IRONSAT 10*  --    RETICABSCT  --  92.7   RETP  --  2.6*   *  --    ERNESTO 142  --    B12 786  --    FOLIC 10.5  --      No results for input(s): PH, PHV, PO2, PO2V, SAT, PCO2, PCO2V, HCO3, HCO3V in the last 168 hours.  Recent Labs   Lab 05/10/20  2207   NTBNPI 7,143*     No results for input(s): DD in the last 168 hours.  Recent Labs   Lab 05/10/20  2207   CRP 39.4*     Recent Labs   Lab 05/10/20  2207        No results for input(s): TSH in the last 168 hours.  No results for input(s): COLOR, APPEARANCE, URINEGLC, URINEBILI, URINEKETONE, SG, UBLD, URINEPH, PROTEIN, UROBILINOGEN, NITRITE, LEUKEST, RBCU, WBCU in the last 168 hours.    Imaging:  Recent Results (from the past 48 hour(s))   XR Chest Port 1 View    Narrative    EXAM: XR CHEST PORT 1 VW  LOCATION: [facility]  DATE/TIME: 5/10/2020 10:26 PM    INDICATION: Shortness of breath  COMPARISON: None.      Impression    IMPRESSION: Large left pleural effusion. No pneumothorax. Pulmonary vascular congestion. Obscured cardiac silhouette. Subacute to chronic appearing displaced fractures of the left third and fourth ribs and right sixth rib.   CT Chest w/o Contrast    Narrative    CT CHEST W/O CONTRAST 5/11/2020 8:28 AM    CLINICAL HISTORY: recurrent left pleural effusion    TECHNIQUE: CT chest without IV contrast. Multiplanar reformats were  obtained. Dose reduction techniques were used.  CONTRAST: None.    COMPARISON: 10/30/2019    FINDINGS:   LUNGS AND PLEURA: Small right and large left pleural effusions of  water attenuation. Passive atelectasis in both lungs. Areas of  groundglass attenuation and air space consolidation in a  peribronchovascular distribution in the right upper lobe. Calcified  bilateral pleural plaques, suggesting prior asbestos exposure.  Calcified granuloma left lower lobe.    MEDIASTINUM/AXILLAE: Severe calcified three-vessel coronary  atherosclerosis and/or stents.  Hypoattenuation of blood pool relative  to myocardium, suggesting anemia. Calcified left hilar and subcarinal  lymph nodes.    UPPER ABDOMEN: Calcified splenic and hepatic granulomas.    MUSCULOSKELETAL: Old fractures of the sternum and multiple bilateral  ribs. Moderate degenerative change in the spine.      Impression    IMPRESSION:   1.  Large left and small right pleural effusions of water density  2.  Airspace abnormalities in the right upper lobe are nonspecific and  can be seen with infection or aspiration pneumonia.    RAMÓN HERNANDEZ MD   Echocardiogram Complete    Narrative    [id number]  [id number]  [id number]^MARCO ANTONIO^KRYSTIAN^ROSALES           [facility]  Echocardiography Laboratory  [address] [place] [address]        Name: CEDRICK PHILLIPS  MRN: [id number]  : 1940  Study Date: 2020 10:47 AM  Age: 80 yrs  Gender: Male  Patient Location: [facility]  Reason For Study: CHF  Ordering Physician: KRYSTIAN BERNARD  Referring Physician: Senthil Moya  Performed By: Janae Hein     BSA: 1.9 m2  Height: 69 in  Weight: 163 lb  HR: 93  BP: 148/78 mmHg  _____________________________________________________________________________  __        Procedure  Complete Portable Echo Adult. Optison (NDC #8745-2354) given intravenously.  _____________________________________________________________________________  __        Interpretation Summary     1. Left ventricular systolic function is normal. The visual ejection fraction  is estimated at 55-60%.  2. No regional wall motion abnormalities noted.  3. The right ventricle is mild to moderately dilated. Mildly decreased right  ventricular systolic function  4. There is mild (1+) tricuspid regurgitation.  5. Moderate pulmonary hypertension; The right ventricular systolic pressure is  approximated at 47mmHg plus the right atrial pressure. IVC diameter and  respiratory changes fall into an intermediate range suggesting an RA  pressure  of 8 mmHg.  6. Left pleural effusion is present.  7. In comparison with the prior report, estimated pulmonary pressures have  increased somewhat, otherwise, no significant change.  _____________________________________________________________________________  __        Left Ventricle  The left ventricle is normal in size. There is mild concentric left  ventricular hypertrophy. Left ventricular systolic function is normal. The  visual ejection fraction is estimated at 55-60%. Left ventricular diastolic  function is normal. No regional wall motion abnormalities noted.     Right Ventricle  The right ventricle is mild to moderately dilated. Mildly decreased right  ventricular systolic function.     Atria  Normal left atrial size. The left atrium is moderately dilated. Right atrial  size is normal. The right atrium is mild to moderately dilated. There is no  color Doppler evidence of an atrial shunt.     Mitral Valve  There is mild mitral annular calcification.        Tricuspid Valve  The tricuspid valve is normal in structure and function. There is mild (1+)  tricuspid regurgitation. The right ventricular systolic pressure is  approximated at 47mmHg plus the right atrial pressure.     Aortic Valve  The aortic valve is trileaflet with aortic valve sclerosis.     Pulmonic Valve  The pulmonic valve is normal in structure and function. There is trace  pulmonic valvular regurgitation.     Vessels  Mild aortic root dilatation. Normal size ascending aorta. IVC diameter and  respiratory changes fall into an intermediate range suggesting an RA pressure  of 8 mmHg.     Pericardium  There is no pericardial effusion. Large left pleural effusion.     _____________________________________________________________________________  __  MMode/2D Measurements & Calculations  IVSd: 1.4 cm  LVIDd: 4.7 cm  LVIDs: 2.5 cm  LVPWd: 1.1 cm  FS: 45.9 %  LV mass(C)d: 226.1 grams  LV mass(C)dI: 119.4 grams/m2     Ao root diam: 3.9 cm  LA  dimension: 5.0 cm  asc Aorta Diam: 3.3 cm  LA/Ao: 1.3  LVOT diam: 1.9 cm  LVOT area: 2.9 cm2  LA Volume (BP): 62.0 ml  LA Volume Index (BP): 32.8 ml/m2  RWT: 0.47        Doppler Measurements & Calculations  MV E max williams: 92.1 cm/sec  MV A max williams: 70.3 cm/sec  MV E/A: 1.3  MV dec slope: 375.1 cm/sec2  MV dec time: 0.25 sec     Ao V2 max: 128.7 cm/sec  Ao max P.0 mmHg  Ao V2 mean: 91.9 cm/sec  Ao mean PG: 3.8 mmHg  Ao V2 VTI: 23.3 cm  CAROLYNN(I,D): 2.2 cm2  CAROLYNN(V,D): 2.5 cm2  LV V1 max P.2 mmHg  LV V1 max: 114.0 cm/sec  LV V1 VTI: 17.7 cm  SV(LVOT): 50.8 ml  SI(LVOT): 26.8 ml/m2  PA acc time: 0.07 sec  TR max williams: 342.1 cm/sec  TR max P.8 mmHg  AV Williams Ratio (DI): 0.89  CAROLYNN Index (cm2/m2): 1.2  E/E' av.9  Lateral E/e': 10.4  Medial E/e': 17.5              _____________________________________________________________________________  __        Report approved by: Naomi Estrada 2020 12:35 PM          Echo:  No results found for this or any previous visit (from the past 4320 hour(s)).

## 2020-05-11 NOTE — PROGRESS NOTES
Brief hospitalist note:    Patient seen and examined today. He has no breath sounds in the lower left back, somewhat diminished in the right base. Very pleasant elderly man who has had 6 episodes of pleural effusion since a fall late last year. He has been followed by Dr Rodriguez. He has not yet seen a pulmonologist. Was supposed to follow up outpatient in April but the appointment was postponed to June due to covid. He is off oxygen at rest but reports becoming extremely short of breath with minimal activity. Said Saturday was one of the worst days of dyspnea he's had since this all began. Discussed case with CT surg TAMAR. Previous workup has not followed a pattern. Last thoracentesis was bright red fluid, but transudate. TTE shows normal EF but BNP quite elevated. Cytology negative for malignant cells.    - pulmonology consult inpatient  - cardiology consult    CT surgery would like additional input prior to performing a more aggressive surgical procedure.    Sejal Sylvester MD

## 2020-05-11 NOTE — ED PROVIDER NOTES
History     Chief Complaint:  Shortness of Breath    The history is provided by the patient.      Tc Garcia is a 80 year old male with a history of atrial fibrillation on Eliquis, type 2 diabetes, hypertension, hyperlipidemia, and pleural effusion who was just in the hospital two weeks ago for pleural effusion, renal insufficiency, and diarrhea. He is back now today because he has been short of breath with exertion the last two days. He denies cough, fever, or chest pain. He has also noted swelling in his legs. He has chronic Afib and is on Eliquis for that.     Allergies:  No Known Drug Allergies     Medications:    Eliquis  Catapres  Lovastatin  Spiriva  Verelan     Past Medical History:    Atrial fibrillation  Diabetic proteinuria  Colon polyps  Rosacea  Type 2 diabetes  Hypertension   Hyperlipidemia  Plantar fasciitis  Plantar fascial fibromatosis  Hemothorax on left  Pleural effusion   Acute kidney injury     Past Surgical History:    Anesthesia cardioversion  Tonsill and adenoidectomy      Family History:    Brother - diabetes  Sister(s) - diabetes, heart disease    Social History:  The patient was unaccompanied to the ED.  Smoking Status: former smoker  Smokeless Tobacco: never  Alcohol Use: not currently   Drug Use: not currently   Marital Status:   [2]     Review of Systems   Constitutional: Negative for fever.   Respiratory: Positive for shortness of breath. Negative for cough.    Cardiovascular: Positive for leg swelling. Negative for chest pain.   All other systems reviewed and are negative.      Physical Exam     Patient Vitals for the past 24 hrs:   BP Temp Temp src Pulse Heart Rate Resp SpO2   05/11/20 0015 (!) 177/102 -- -- 90 89 25 97 %   05/11/20 0000 -- -- -- 100 101 (!) 36 (!) 89 %   05/10/20 2345 -- -- -- -- 82 22 98 %   05/10/20 2315 (!) 179/94 -- -- 81 80 19 100 %   05/10/20 2300 (!) 183/87 -- -- 82 81 22 99 %   05/10/20 2250 -- -- -- -- 81 21 98 %   05/10/20 2245 (!) 182/105 -- --  82 -- -- 97 %   05/10/20 2242 -- -- -- -- -- 21 --   05/10/20 2230 (!) 175/91 -- -- 80 80 25 --   05/10/20 2224 -- -- -- -- -- -- 96 %   05/10/20 2215 (!) 182/126 -- -- 85 -- 19 96 %   05/10/20 2206 -- -- -- -- 88 23 93 %   05/10/20 2205 (!) 212/102 -- -- -- -- -- --   05/10/20 2203 -- 98.2  F (36.8  C) Oral -- 93 25 94 %       Physical Exam  Constitutional: Elderly white male sitting, no stridor, no respiratory distress.   HENT: No signs of trauma.   Eyes: EOM are normal. Pupils are equal, round, and reactive to light.   Neck: Normal range of motion. Mild JVD. No cervical adenopathy.  Cardiovascular: 1/6 systolic murmur right upper sternal boarder. Regular rhythm.  Exam reveals no gallop and no friction rub.    Pulmonary/Chest: Diminished breath sounds left base with thoracentesis bandage. No wheezes or rales.  Abdominal: Soft. No tenderness. No rebound or guarding.   Musculoskeletal: 2+lower extremity edema. No tenderness.   Lymphadenopathy: No lymphadenopathy.   Neurological: Alert and oriented to person, place, and time. Normal strength. Coordination normal.   Skin: Skin is warm and dry. No rash noted. No erythema.     Emergency Department Course     ECG:  Indication: Shortness of breath  ECG taken at 2236, ECG read by Dr. Manfred MD  Normal sinus rhythm  Nonspecific T wave abnormality   Rate 81 bpm. PA interval 148. QRS duration 104. QT/QTc 378/439. P-R-T axes 42 33 105.    Imaging:  Radiology findings were communicated with the patient who voiced understanding of the findings.    XR chest:   IMPRESSION: Large left pleural effusion. No pneumothorax. Pulmonary vascular congestion. Obscured cardiac silhouette. Subacute to chronic appearing displaced fractures of the left third and fourth ribs and right sixth rib.  Report per radiology     Laboratory:  Laboratory findings were communicated with the patient who voiced understanding of the findings.    BNP: 7143  Troponin (Collected 2207): 0.020  CBC: WBC 18.1,  HGB 9.5,    CMP: Glucose 208, Cr 1.52, GFR estimate 42, albumin 3.2, protein total 6.4 Rest WNL  INR: 1.49        Interventions:  2249 Lasix 40 mg IV x 2    Emergency Department Course:  Past medical records, nursing notes, and vitals reviewed.    2215 I performed an exam of the patient as documented above.     EKG obtained in the ED, see results above.     IV was inserted and blood was drawn for laboratory testing, results above.    The patient was sent for a chest XR while in the emergency department, results above.     I rechecked the patient and discussed the results of his workup thus far.     Findings and plan explained to the Patient who consents to admission. Discussed the patient with Dr. Roach, who will admit the patient to a [facility]/sarug bed for further monitoring, evaluation, and treatment.    I personally reviewed the laboratory and imaging results with the Patient and answered all related questions prior to admission.     Impression & Plan     Medical Decision Making:  Tc Garcia is an 80 year old who was discharged from our hospital about 2 weeks ago after having renal insufficiency from diarrhea and a thoracentesis done for a large pleural effusion. He had been doing well at home, but the last two days he became very dyspneic on exertion with any activity. He has noted his legs starting to swell up again too. He is on Eliquis for Afib. He has hypertension and diabetes. Patient denies any cough, fevers, any chest pain or discomfort. On exam, he has some neck vein distention, diminished breath sounds on the left side and significant lower extremity edema. Patient was given IV Lasix while his work up was begun. His chest XR shows a moderate size left pleural effusion. His lab work reveals creatine which is 1.52, which is better than his creatinine on discharge, which was 1.6. But his BNP is over 7,000.  his troponin is within normal limits. Patient's symptoms are consistent with congestive heart  failure, fluid overload, as well as pleural effusion and renal insufficiency. He is hypoxic at baseline and  requires oxygen       Diagnosis:    ICD-10-CM    1. Acute on chronic congestive heart failure, unspecified heart failure type (H)  I50.9 CRP inflammation     CRP inflammation     Procalcitonin     Procalcitonin     CANCELED: CRP inflammation     CANCELED: Procalcitonin   2. Pleural effusion  J90    3. Renal insufficiency  N28.9        Disposition:  Admitted to med/surg.    Scribe Disclosure:  Kelly HALEY, am serving as a scribe at 10:28 PM on 5/10/2020 to document services personally performed by Law Shearer MD based on my observations and the provider's statements to me.     5/10/2020    EMERGENCY DEPARTMENT       Law Shearer MD  05/11/20 0058

## 2020-05-11 NOTE — PLAN OF CARE
Neuro- A&Ox4  Most Recent Vitals- Temp: 98.6  F (37  C) Temp src: Oral BP: (!) 176/103 Pulse: 89 Heart Rate: 94 Resp: 22 SpO2: 92 % O2 Device: Nasal cannula Oxygen Delivery: 2 LPM  Tele/Cardiac- Pt was SR upon arrival to unit but converted to A fib CVR overnight, denies CP  Resp- Continues on 3L via NC, gets LUCIANO, LS diminished L>R  Activity- SBA  Pain- denies  Drips- Intermittent ABX  Drains/Tubes- PIV  Skin- BLE edema +2, ankles +3  GI/- WDL  Aggression Color- Green  Plan- Plan for a thoracentesis, CT of chest, and Echo, possible stress test?  Misc- NA    Zara Rangel RN

## 2020-05-11 NOTE — ED TRIAGE NOTES
SOB with exertion, over the past a few days, no pain. End of April, similar visit. Reports fluids in his ankles. Change in meds recently. Diabetic, .

## 2020-05-11 NOTE — PROGRESS NOTES
Thoracic Surgery:    Discussed case and imaging with Dr. Rodriguez. No surgical intervention recommended at this time; recommend prn thoracenteses and Cardiology consult for medication optimization.     Discussed with patient that surgery not recommended due to likelihood of suboptimal pleurodesis results in this transudative effusion setting. Patient understands and is appreciative of input.    Estrella Giraldo PA-C with Dr. Warren Rodriguez  [facility]  Cell [phone]

## 2020-05-11 NOTE — CONSULTS
PULMONOLOGY CONSULTATION  Date of service: 5/11/2020    [facility]    _____________________________________________________    Tc Garcia  80 year old male  [id number]  [address] [facility] [id number]    Primary Care Provider:  Senthil Moya  Admission Date: 5/10/2020  Hospital Attending Physician:  León Roach, *  ________________________________________    CHIEF COMPLAINT : I was asked to see this patient by Dr. Sylvester for evaluation of recurrent pleural effusion   Informant: patient    HISTORY OF PRESENT ILLNESS     Tc is an 80-year-old gentleman with past medical history of atrial fibrillation, uncontrolled htn, CKD, DM, and past history of a traumatic fall and multiple left rib fractures with large left hemothorax treated with chest tube last November. Since November, he has undergone six US-guided left thoracenteses for recurrent symptomatic left pleural effusions Cytology from 12/32/19 pleural fluid cytology negative for malignancy and transudate. Typically, IR would drain approximately 2000 ml clear/straw-colored fluid and patient would experience transient relief of SOB that would recur within 4 weeks. Most recently, on 4/29 he was tapped for 2000 dark red fluid.      Tc felt increasingly dyspneic to the point he could not ambulate more than about 10 feet without rest and thus he presented to the ED. No fever, cough, chest pain. In the ED, sats were 83% on room air initially, temp 98.2. WBC was 18.1, Hgb 9.5,  procalcitonin 0.38., BNP 7143, CRP 39.4, Creat 1.52, INR 1.49. CXR showed a large left effusion and CT chest showed large left pleural effusion with right uper lobe airspace abnormalities/consolidation suspicious for PNA. He was given IV Lasix and admitted. Does transiently feel better after fluid drained.        HOME MEDICATIONS     Medications Prior to Admission   Medication Sig Dispense Refill Last Dose     amiodarone (PACERONE) 200 MG tablet Take  200 mg by mouth daily   5/10/2020 at Unknown time     apixaban ANTICOAGULANT (ELIQUIS) 5 MG tablet Take 1/2 tablet (2.5mg) by mouth twice daily. You will have your labs checked on 5/4/20 and your PCP will direct you when it is safe to increase your dose back to 1 tablet (5mg) twice daily.   5/10/2020 at Unknown time     cloNIDine (CATAPRES) 0.3 MG tablet Take 0.3 mg by mouth 2 times daily   5/10/2020 at Unknown time     glucagon 1 MG kit 1 mg as needed for low blood sugar        INSULIN PUMP - OUTPATIENT Novolog Insulin  BASAL RATES and times:  Midnight to 6am: 0.65 units/hr  6am to 10:30 pm: 0.95 units/hour    10:30pm to midnight: 0.55 units/hr  CARB RATIO: 1 unit per 15 grams  Correction Factor (Sensitivity): 55mg/dL  BLOOD GLUCOSE TARGET and times:  12   AM (midnight): 100 - 140  5:30     AM:  100 - 120  10   PM:  100 - 140  Active Insulin Time:  5 hours   Bolus-Correction 1 unit(s) to lower blood glucose by 55 mg/dL  Checks insulin about 4-5 times a day manually  (Per Rx, pt uses 50-60 units/day)   5/11/2020 at Unknown time     LOVASTATIN 20 MG PO TABS 1 TABLET DAILY AT DINNER 90 Tab 0 5/10/2020 at Unknown time     nystatin (MYCOSTATIN) 635291 UNIT/GM external cream Apply topically 2 times daily   5/10/2020 at Unknown time     tiotropium (SPIRIVA) 18 MCG inhaled capsule Inhale 18 mcg into the lungs daily   5/10/2020 at Unknown time     verapamil ER (VERELAN) 240 MG 24 hr capsule Take 1 capsule (240 mg) by mouth At Bedtime   5/10/2020 at Unknown time       PAST MEDICAL HISTORY      Past Medical History:   Diagnosis Date     BABIE (acute kidney injury) (H) 11/2019    due to ATN      Anemia      Atrial fibrillation (H)      CKD (chronic kidney disease) stage 3, GFR 30-59 ml/min (H)      Diabetic PROTEINURIA 2003     Fall 11/2019    suffered multiple left rib fractures, C3 and T2 fractures     Mixed hyperlipidemia 2003     Personal history of colonic polyps 1972    gets colonoscopy every five years, due in 2006      Pleural effusion on left 2019    after multiple rib fractures     Rosacea      Type II or unspecified type diabetes mellitus without mention of complication, not stated as uncontrolled      Unspecified essential hypertension      Past Surgical History:   Procedure Laterality Date     ANESTHESIA CARDIOVERSION N/A 2/3/2020    Procedure: ANESTHESIA, FOR CARDIOVERSION;  Surgeon: GENERIC ANESTHESIA PROVIDER;  Location:  OR     HC REMOVE TONSILS/ADENOIDS,<11 Y/O      T & A <12y.o.       ALLERGIES     Allergies   Allergen Reactions     No Known Drug Allergies        SOCIAL / SUBSTANCE HISTORY     Social History     Socioeconomic History     Marital status:      Spouse name: Lianna     Number of children: 4     Years of education: 16     Highest education level: Not on file   Occupational History     Occupation: [employer]     Employer: [employer]    Social Needs     Financial resource strain: Not on file     Food insecurity     Worry: Not on file     Inability: Not on file     Transportation needs     Medical: Not on file     Non-medical: Not on file   Tobacco Use     Smoking status: Former Smoker     Packs/day: 0.20     Years: 40.00     Pack years: 8.00     Types: Cigarettes     Last attempt to quit: 2008     Years since quittin.3     Smokeless tobacco: Never Used     Tobacco comment: occasional   Substance and Sexual Activity     Alcohol use: Not Currently     Alcohol/week: 35.0 standard drinks     Drug use: Not Currently     Sexual activity: Not on file   Lifestyle     Physical activity     Days per week: Not on file     Minutes per session: Not on file     Stress: Not on file   Relationships     Social connections     Talks on phone: Not on file     Gets together: Not on file     Attends Anabaptism service: Not on file     Active member of club or organization: Not on file     Attends meetings of clubs or organizations: Not on file     Relationship status: Not on file      "Intimate partner violence     Fear of current or ex partner: Not on file     Emotionally abused: Not on file     Physically abused: Not on file     Forced sexual activity: Not on file   Other Topics Concern     Parent/sibling w/ CABG, MI or angioplasty before 65F 55M? Not Asked   Social History Narrative     Not on file       FAMILY HISTORY     Family History   Problem Relation Age of Onset     Family History Negative Mother          age 71     Family History Negative Father          age 70     Diabetes Brother         alive age 77     Diabetes Brother         alive age 76     Family History Negative Brother              Family History Negative Brother              Diabetes Sister         alive age76     Family History Negative Sister          age 86     Heart Disease Sister              Family History Negative Sister              Family History Negative Sister              Diabetes Sister      Diabetes Sister      Diabetes Brother      Diabetes Brother        REVIEW OF SYSTEMS   A comprehensive review of systems was negative except for items noted in HPI/Subjective.    PHYSICAL EXAMINATION   Temp (24hrs), Av.1  F (36.7  C), Min:97.5  F (36.4  C), Max:98.6  F (37  C)    Vital signs:  Temp: 97.5  F (36.4  C) Temp src: Oral BP: (!) 140/91 Pulse: 89 Heart Rate: 77 Resp: 22 SpO2: 92 % O2 Device: None (Room air) Oxygen Delivery: 2 LPM   Weight: 74.3 kg (163 lb 14.4 oz)  Estimated body mass index is 24.2 kg/m  as calculated from the following:    Height as of 20: 1.753 m (5' 9\").    Weight as of this encounter: 74.3 kg (163 lb 14.4 oz).        I/O last 3 completed shifts:  In: 203 [P.O.:200; I.V.:3]  Out: 3050 [Urine:3050]    CONSTITUTIONAL/GENERAL APPEARANCE: Alert male. No Apparent Distress.  PSYCHIATRIC: Pleasant and appropriate mood and affect. Oriented x 3.  EARS, NOSE,THROAT,MOUTH: External ears and nose overall normal. nl oral mucosa.   NECK: " Neck appearance normal. No neck masses and the thyroid not enlarged.   RESPIRATORY: Non-labored effort. Dec on left, dull on left  CARDIOVASCULAR: S1, S2, regular  ABDOMEN: round  LYMPHATIC: no cervical lymphadenopathy.  SKIN: Skin color was normal    LABORATORY ASSESSMENT    Arterial Blood GasNo lab results found in last 7 days.  CBC  Recent Labs   Lab 05/10/20  2207   WBC 18.1*   RBC 3.56*   HGB 9.5*   HCT 30.0*   MCV 84   MCH 26.7   MCHC 31.7   RDW 18.9*        BMP  Recent Labs   Lab 05/10/20  2207      POTASSIUM 3.5   CHLORIDE 106   LLOYD 8.9   CO2 24   BUN 18   CR 1.52*   *     INR  Recent Labs   Lab 05/10/20  2207   INR 1.49*      BNPNo lab results found in last 7 days.  VENOUS BLOOD GASESNo lab results found in last 7 days.      Additional labs and/or comments:     IMAGING      CT Chest 5/11  1.  Large left and small right pleural effusions of water density  2.  Airspace abnormalities in the right upper lobe are nonspecific and can be seen with infection or aspiration pneumonia.    PFT & OTHER TESTING       ASSESSMENT / PLAN      Pulmonary diagnoses:  Abnl CT/CXR R91.8  Pleural effusion  Resp fail acute J96.00      ASSESSMENT : 80M with recurrent unilateral pleural effusion. Has had 6 thoras since 12/30/19. Initial labs with transudate, no further fluid analysis ordered. Suspect multifactorial in nature with ckd, poor nutrition, prior thoras with at least partially trapped lung following initial insult. Needs definitive management. Could consider tunneled pleural catheter vs surgical pleurodesis.       PLAN:     Discussed with Thoracic Surgery team    Tunneled pleural catheter to be placed by IR tomorrow. Discussed with IR. Order placed. Please order serum and pleural fluid studies.    Discussed with cardiology. Agree with consideration of RHC    Will follow    Jhon Olivarez  [facility]  Office: [phone]  Pager: [phone]

## 2020-05-11 NOTE — PROGRESS NOTES
RECEIVING UNIT ED HANDOFF REVIEW    ED Nurse Handoff Report was reviewed by: Zara Rangel RN on May 11, 2020 at 1:15 AM

## 2020-05-12 ENCOUNTER — APPOINTMENT (OUTPATIENT)
Dept: INTERVENTIONAL RADIOLOGY/VASCULAR | Facility: CLINIC | Age: 80
DRG: 286 | End: 2020-05-12
Attending: INTERNAL MEDICINE
Payer: COMMERCIAL

## 2020-05-12 LAB
ANION GAP SERPL CALCULATED.3IONS-SCNC: 6 MMOL/L (ref 3–14)
APPEARANCE FLD: NORMAL
BUN SERPL-MCNC: 16 MG/DL (ref 7–30)
CALCIUM SERPL-MCNC: 8 MG/DL (ref 8.5–10.1)
CHLORIDE SERPL-SCNC: 103 MMOL/L (ref 94–109)
CO2 SERPL-SCNC: 33 MMOL/L (ref 20–32)
COLOR FLD: YELLOW
CREAT SERPL-MCNC: 1.59 MG/DL (ref 0.66–1.25)
ERYTHROCYTE [DISTWIDTH] IN BLOOD BY AUTOMATED COUNT: 18.5 % (ref 10–15)
GFR SERPL CREATININE-BSD FRML MDRD: 40 ML/MIN/{1.73_M2}
GLUCOSE BLDC GLUCOMTR-MCNC: 189 MG/DL (ref 70–99)
GLUCOSE BLDC GLUCOMTR-MCNC: 211 MG/DL (ref 70–99)
GLUCOSE BLDC GLUCOMTR-MCNC: 64 MG/DL (ref 70–99)
GLUCOSE FLD-MCNC: 108 MG/DL
GLUCOSE SERPL-MCNC: 94 MG/DL (ref 70–99)
GRAM STN SPEC: NORMAL
GRAM STN SPEC: NORMAL
HCT VFR BLD AUTO: 26.1 % (ref 40–53)
HGB BLD-MCNC: 8.4 G/DL (ref 13.3–17.7)
LDH FLD L TO P-CCNC: 203 U/L
LDH SERPL L TO P-CCNC: 232 U/L (ref 85–227)
LYMPHOCYTES NFR FLD MANUAL: 72 %
MAGNESIUM SERPL-MCNC: 1.7 MG/DL (ref 1.6–2.3)
MCH RBC QN AUTO: 26.8 PG (ref 26.5–33)
MCHC RBC AUTO-ENTMCNC: 32.2 G/DL (ref 31.5–36.5)
MCV RBC AUTO: 83 FL (ref 78–100)
MONOS+MACROS NFR FLD MANUAL: 6 %
NEUTS BAND NFR FLD MANUAL: 21 %
OTHER CELLS FLD MANUAL: 1 %
PLATELET # BLD AUTO: 322 10E9/L (ref 150–450)
POTASSIUM SERPL-SCNC: 2.4 MMOL/L (ref 3.4–5.3)
POTASSIUM SERPL-SCNC: 3 MMOL/L (ref 3.4–5.3)
PROT FLD-MCNC: 2.1 G/DL
RBC # BLD AUTO: 3.13 10E12/L (ref 4.4–5.9)
SODIUM SERPL-SCNC: 142 MMOL/L (ref 133–144)
SPECIMEN SOURCE FLD: NORMAL
SPECIMEN SOURCE: NORMAL
TSH SERPL DL<=0.005 MIU/L-ACNC: 3.47 MU/L (ref 0.4–4)
WBC # BLD AUTO: 10.7 10E9/L (ref 4–11)
WBC # FLD AUTO: 103 /UL

## 2020-05-12 PROCEDURE — 84443 ASSAY THYROID STIM HORMONE: CPT | Performed by: HOSPITALIST

## 2020-05-12 PROCEDURE — 88112 CYTOPATH CELL ENHANCE TECH: CPT | Mod: 26 | Performed by: HOSPITALIST

## 2020-05-12 PROCEDURE — 27211193 ZZ H WOUND GLUE CR1

## 2020-05-12 PROCEDURE — 83615 LACTATE (LD) (LDH) ENZYME: CPT | Performed by: HOSPITALIST

## 2020-05-12 PROCEDURE — 25000128 H RX IP 250 OP 636: Performed by: NURSE PRACTITIONER

## 2020-05-12 PROCEDURE — 25000128 H RX IP 250 OP 636: Performed by: HOSPITALIST

## 2020-05-12 PROCEDURE — 93005 ELECTROCARDIOGRAM TRACING: CPT

## 2020-05-12 PROCEDURE — 25000132 ZZH RX MED GY IP 250 OP 250 PS 637: Performed by: HOSPITALIST

## 2020-05-12 PROCEDURE — 84157 ASSAY OF PROTEIN OTHER: CPT | Performed by: HOSPITALIST

## 2020-05-12 PROCEDURE — 87205 SMEAR GRAM STAIN: CPT | Performed by: HOSPITALIST

## 2020-05-12 PROCEDURE — 88112 CYTOPATH CELL ENHANCE TECH: CPT | Performed by: HOSPITALIST

## 2020-05-12 PROCEDURE — 36415 COLL VENOUS BLD VENIPUNCTURE: CPT | Performed by: HOSPITALIST

## 2020-05-12 PROCEDURE — 00000155 ZZHCL STATISTIC H-CELL BLOCK W/STAIN: Performed by: HOSPITALIST

## 2020-05-12 PROCEDURE — 93010 ELECTROCARDIOGRAM REPORT: CPT | Performed by: INTERNAL MEDICINE

## 2020-05-12 PROCEDURE — 89051 BODY FLUID CELL COUNT: CPT | Performed by: HOSPITALIST

## 2020-05-12 PROCEDURE — 99221 1ST HOSP IP/OBS SF/LOW 40: CPT | Performed by: INTERNAL MEDICINE

## 2020-05-12 PROCEDURE — 87070 CULTURE OTHR SPECIMN AEROBIC: CPT | Performed by: HOSPITALIST

## 2020-05-12 PROCEDURE — 27210885 ZZH ACCESSORY CR4

## 2020-05-12 PROCEDURE — C1729 CATH, DRAINAGE: HCPCS

## 2020-05-12 PROCEDURE — 00000102 ZZHCL STATISTIC CYTO WRIGHT STAIN TC: Performed by: HOSPITALIST

## 2020-05-12 PROCEDURE — 83735 ASSAY OF MAGNESIUM: CPT | Performed by: HOSPITALIST

## 2020-05-12 PROCEDURE — 88305 TISSUE EXAM BY PATHOLOGIST: CPT | Mod: 26 | Performed by: HOSPITALIST

## 2020-05-12 PROCEDURE — 84132 ASSAY OF SERUM POTASSIUM: CPT | Performed by: HOSPITALIST

## 2020-05-12 PROCEDURE — 88305 TISSUE EXAM BY PATHOLOGIST: CPT | Performed by: HOSPITALIST

## 2020-05-12 PROCEDURE — 75989 ABSCESS DRAINAGE UNDER X-RAY: CPT

## 2020-05-12 PROCEDURE — 80048 BASIC METABOLIC PNL TOTAL CA: CPT | Performed by: HOSPITALIST

## 2020-05-12 PROCEDURE — 21000001 ZZH R&B HEART CARE

## 2020-05-12 PROCEDURE — 82945 GLUCOSE OTHER FLUID: CPT | Performed by: HOSPITALIST

## 2020-05-12 PROCEDURE — 85027 COMPLETE CBC AUTOMATED: CPT | Performed by: HOSPITALIST

## 2020-05-12 PROCEDURE — 00000146 ZZHCL STATISTIC GLUCOSE BY METER IP

## 2020-05-12 PROCEDURE — 27210732 ZZH ACCESSORY CR1

## 2020-05-12 PROCEDURE — 99233 SBSQ HOSP IP/OBS HIGH 50: CPT | Performed by: INTERNAL MEDICINE

## 2020-05-12 PROCEDURE — 0W9B30Z DRAINAGE OF LEFT PLEURAL CAVITY WITH DRAINAGE DEVICE, PERCUTANEOUS APPROACH: ICD-10-PCS | Performed by: RADIOLOGY

## 2020-05-12 PROCEDURE — 99232 SBSQ HOSP IP/OBS MODERATE 35: CPT | Performed by: HOSPITALIST

## 2020-05-12 RX ORDER — MICONAZOLE NITRATE 20 MG/G
CREAM TOPICAL 2 TIMES DAILY
Status: DISCONTINUED | OUTPATIENT
Start: 2020-05-12 | End: 2020-05-14 | Stop reason: HOSPADM

## 2020-05-12 RX ORDER — LIDOCAINE 40 MG/G
CREAM TOPICAL
Status: DISCONTINUED | OUTPATIENT
Start: 2020-05-12 | End: 2020-05-12

## 2020-05-12 RX ORDER — DEXTROSE MONOHYDRATE 25 G/50ML
25-50 INJECTION, SOLUTION INTRAVENOUS
Status: DISCONTINUED | OUTPATIENT
Start: 2020-05-12 | End: 2020-05-14 | Stop reason: HOSPADM

## 2020-05-12 RX ORDER — POTASSIUM CHLORIDE 7.45 MG/ML
10 INJECTION INTRAVENOUS
Status: DISCONTINUED | OUTPATIENT
Start: 2020-05-12 | End: 2020-05-12

## 2020-05-12 RX ORDER — FLUMAZENIL 0.1 MG/ML
0.2 INJECTION, SOLUTION INTRAVENOUS
Status: DISCONTINUED | OUTPATIENT
Start: 2020-05-12 | End: 2020-05-14 | Stop reason: HOSPADM

## 2020-05-12 RX ORDER — NICOTINE POLACRILEX 4 MG
15-30 LOZENGE BUCCAL
Status: DISCONTINUED | OUTPATIENT
Start: 2020-05-12 | End: 2020-05-14 | Stop reason: HOSPADM

## 2020-05-12 RX ORDER — NYSTATIN 100000 U/G
CREAM TOPICAL 2 TIMES DAILY
Status: DISCONTINUED | OUTPATIENT
Start: 2020-05-12 | End: 2020-05-12 | Stop reason: CLARIF

## 2020-05-12 RX ORDER — LORAZEPAM 0.5 MG/1
0.5 TABLET ORAL
Status: DISCONTINUED | OUTPATIENT
Start: 2020-05-12 | End: 2020-05-13 | Stop reason: HOSPADM

## 2020-05-12 RX ORDER — HEPARIN SODIUM 200 [USP'U]/100ML
1 INJECTION, SOLUTION INTRAVENOUS CONTINUOUS PRN
Status: DISCONTINUED | OUTPATIENT
Start: 2020-05-12 | End: 2020-05-12

## 2020-05-12 RX ORDER — POTASSIUM CHLORIDE 1500 MG/1
40 TABLET, EXTENDED RELEASE ORAL ONCE
Status: COMPLETED | OUTPATIENT
Start: 2020-05-12 | End: 2020-05-12

## 2020-05-12 RX ORDER — OXYCODONE HYDROCHLORIDE 5 MG/1
5 TABLET ORAL EVERY 4 HOURS PRN
Status: DISCONTINUED | OUTPATIENT
Start: 2020-05-12 | End: 2020-05-14 | Stop reason: HOSPADM

## 2020-05-12 RX ORDER — POTASSIUM CHLORIDE 1500 MG/1
20 TABLET, EXTENDED RELEASE ORAL
Status: DISCONTINUED | OUTPATIENT
Start: 2020-05-12 | End: 2020-05-13 | Stop reason: HOSPADM

## 2020-05-12 RX ORDER — NALOXONE HYDROCHLORIDE 0.4 MG/ML
.1-.4 INJECTION, SOLUTION INTRAMUSCULAR; INTRAVENOUS; SUBCUTANEOUS
Status: DISCONTINUED | OUTPATIENT
Start: 2020-05-12 | End: 2020-05-12

## 2020-05-12 RX ORDER — FENTANYL CITRATE 50 UG/ML
25-50 INJECTION, SOLUTION INTRAMUSCULAR; INTRAVENOUS EVERY 5 MIN PRN
Status: DISCONTINUED | OUTPATIENT
Start: 2020-05-12 | End: 2020-05-12

## 2020-05-12 RX ORDER — POTASSIUM CL/LIDO/0.9 % NACL 10MEQ/0.1L
10 INTRAVENOUS SOLUTION, PIGGYBACK (ML) INTRAVENOUS
Status: COMPLETED | OUTPATIENT
Start: 2020-05-12 | End: 2020-05-12

## 2020-05-12 RX ORDER — LORAZEPAM 2 MG/ML
0.5 INJECTION INTRAMUSCULAR
Status: DISCONTINUED | OUTPATIENT
Start: 2020-05-12 | End: 2020-05-13 | Stop reason: HOSPADM

## 2020-05-12 RX ADMIN — ACETAMINOPHEN 650 MG: 325 TABLET, FILM COATED ORAL at 21:05

## 2020-05-12 RX ADMIN — Medication 10 MEQ: at 07:49

## 2020-05-12 RX ADMIN — OXYCODONE HYDROCHLORIDE 5 MG: 5 TABLET ORAL at 21:06

## 2020-05-12 RX ADMIN — Medication 10 MEQ: at 07:00

## 2020-05-12 RX ADMIN — AMIODARONE HYDROCHLORIDE 200 MG: 200 TABLET ORAL at 09:22

## 2020-05-12 RX ADMIN — MICONAZOLE NITRATE: 20 CREAM TOPICAL at 11:15

## 2020-05-12 RX ADMIN — CLONIDINE HYDROCHLORIDE 0.3 MG: 0.1 TABLET ORAL at 20:38

## 2020-05-12 RX ADMIN — PIPERACILLIN AND TAZOBACTAM 3.38 G: 3; .375 INJECTION, POWDER, FOR SOLUTION INTRAVENOUS at 17:09

## 2020-05-12 RX ADMIN — OXYCODONE HYDROCHLORIDE 5 MG: 5 TABLET ORAL at 13:19

## 2020-05-12 RX ADMIN — UMECLIDINIUM 1 PUFF: 62.5 AEROSOL, POWDER ORAL at 09:24

## 2020-05-12 RX ADMIN — PIPERACILLIN AND TAZOBACTAM 3.38 G: 3; .375 INJECTION, POWDER, FOR SOLUTION INTRAVENOUS at 05:33

## 2020-05-12 RX ADMIN — FUROSEMIDE 40 MG: 10 INJECTION, SOLUTION INTRAMUSCULAR; INTRAVENOUS at 09:23

## 2020-05-12 RX ADMIN — Medication 10 MEQ: at 09:21

## 2020-05-12 RX ADMIN — CLONIDINE HYDROCHLORIDE 0.3 MG: 0.1 TABLET ORAL at 09:22

## 2020-05-12 RX ADMIN — LOVASTATIN 20 MG: 20 TABLET ORAL at 21:05

## 2020-05-12 RX ADMIN — VERAPAMIL HYDROCHLORIDE 240 MG: 240 TABLET, FILM COATED, EXTENDED RELEASE ORAL at 21:05

## 2020-05-12 RX ADMIN — Medication 10 MEQ: at 13:13

## 2020-05-12 RX ADMIN — OXYCODONE HYDROCHLORIDE 5 MG: 5 TABLET ORAL at 17:08

## 2020-05-12 RX ADMIN — POTASSIUM CHLORIDE 40 MEQ: 1500 TABLET, EXTENDED RELEASE ORAL at 06:33

## 2020-05-12 RX ADMIN — ACETAMINOPHEN 650 MG: 325 TABLET, FILM COATED ORAL at 17:08

## 2020-05-12 RX ADMIN — PIPERACILLIN AND TAZOBACTAM 3.38 G: 3; .375 INJECTION, POWDER, FOR SOLUTION INTRAVENOUS at 11:54

## 2020-05-12 RX ADMIN — Medication 10 MEQ: at 14:20

## 2020-05-12 RX ADMIN — MIDAZOLAM HYDROCHLORIDE 1 MG: 1 INJECTION, SOLUTION INTRAMUSCULAR; INTRAVENOUS at 08:15

## 2020-05-12 RX ADMIN — FENTANYL CITRATE 50 MCG: 50 INJECTION, SOLUTION INTRAMUSCULAR; INTRAVENOUS at 08:15

## 2020-05-12 RX ADMIN — POTASSIUM CHLORIDE 20 MEQ: 1500 TABLET, EXTENDED RELEASE ORAL at 14:31

## 2020-05-12 ASSESSMENT — ACTIVITIES OF DAILY LIVING (ADL)
ADLS_ACUITY_SCORE: 14
ADLS_ACUITY_SCORE: 15
ADLS_ACUITY_SCORE: 14

## 2020-05-12 NOTE — PROVIDER NOTIFICATION
MD Notification    Notified Person: MD    Notified Person Name:Dr. Roach    Notification Date/Time:05/12/20 0620    Notification Interaction:amcom smart web page    Purpose of Notification:Critical potassium 2.4 Please advise, thanks    Orders Received:    Comments:

## 2020-05-12 NOTE — SEDATION DOCUMENTATION
Interventional Radiology Intra-procedural Nursing Note    Patient Name: Tc Garcia  Medical Record Number: [id number]  Today's Date: May 12, 2020    Start Time: 815  End of procedure time: 835  Procedure: Left Tunneled chest tube placement  Report given to: [facility]  Time pt departs:  835  PleurX catheter placed without difficulty.  1550 ml of fluid removed prior to being hooked up to a chest tube drain.  PleurX Lot # [id number], Exp date 6-.  Pt tolerated procedure well.  Pt received Versed 1 mg IV, Fentanyl 50 mcg IV.     Other Notes:     Radha Monet RN

## 2020-05-12 NOTE — PROGRESS NOTES
"PULMONOLOGY PROGRESS NOTE    Date of Admission: 5/10/2020    CC/Reason for Hospital visit: recurrent unilateral pleural effusion  SUBJECTIVE      PleurX/TPC placed 5/12. Feeling better. On room air. RHC being considered for later today.      ROS: A Problem Pertinent review of systems was negative except for items noted in HPI.  Past Medical, Family, and Social/Substance History has been reviewed: No interval changes.   OBJECTIVE   Vital signs:  Temp: 97.6  F (36.4  C) Temp src: Oral BP: 107/61 Pulse: 79 Heart Rate: 79 Resp: 18 SpO2: 97 % O2 Device: None (Room air) Oxygen Delivery: 2 LPM   Weight: 69.3 kg (152 lb 12.8 oz)  Estimated body mass index is 22.56 kg/m  as calculated from the following:    Height as of 4/25/20: 1.753 m (5' 9\").    Weight as of this encounter: 69.3 kg (152 lb 12.8 oz).        I/O last 3 completed shifts:  In: 443 [P.O.:440; I.V.:3]  Out: 2950 [Urine:2950]      CONSTITUTIONAL/GENERAL APPEARANCE: Alert male. No Apparent Distress.  PSYCHIATRIC: Pleasant and appropriate mood and affect. Oriented x 3.  EARS, NOSE,THROAT,MOUTH: External ears and nose overall normal. Normal oral mucosa.   NECK: Neck appearance normal. No neck masses and the thyroid is not enlarged.   RESPIRATORY: Non-labored effort. TPC on left, draining serosang fluid  CARDIOVASCULAR: irr      LABORATORY ASSESSMENT    Arterial Blood GasNo lab results found in last 7 days.  CBC  Recent Labs   Lab 05/12/20  0541 05/10/20  2207   WBC 10.7 18.1*   RBC 3.13* 3.56*   HGB 8.4* 9.5*   HCT 26.1* 30.0*   MCV 83 84   MCH 26.8 26.7   MCHC 32.2 31.7   RDW 18.5* 18.9*    449     BMP  Recent Labs   Lab 05/12/20  0541 05/10/20  2207    141   POTASSIUM 2.4* 3.5   CHLORIDE 103 106   LLOYD 8.0* 8.9   CO2 33* 24   BUN 16 18   CR 1.59* 1.52*   GLC 94 208*     INR  Recent Labs   Lab 05/10/20  2207   INR 1.49*      BNPNo lab results found in last 7 days.  VENOUS BLOOD GASESNo lab results found in last 7 days.      Additional labs and/or " comments:     IMAGING      CT Chest 5/11  1.  Large left and small right pleural effusions of water density  2.  Airspace abnormalities in the right upper lobe are nonspecific and can be seen with infection or aspiration pneumonia.    PFT & OTHER TESTING       ASSESSMENT / PLAN      Pulmonary diagnoses:  Abnl CT/CXR R91.8  Pleural effusion  Resp fail acute J96.00        ASSESSMENT : 80M with recurrent unilateral pleural effusion. Has had 6 thoras since 12/30/19. Initial labs with transudate. Suspect multifactorial in nature with ckd, poor nutrition, prior thoras with at least partially trapped lung following initial insult. PleurX/TPC placed 5/12. Repeat labs pending. Anticipate that he will go home with pleurX and regular draining      PLAN:     Discussed with Thoracic Surgery    Await pleural fluid labs    Continue drain to pleurevac today    Discussed with cardiology. Agree with consideration of RHC    Will follow    Jhon Olivarez  [facility]  Office: [phone]  Pager: [phone]

## 2020-05-12 NOTE — PROGRESS NOTES
Reviewed with yeimi Grace seen and examined.  Admitted for recurrent left sided pleural effusion. S/P chest tube placement.  History of paroxysmal atrial fib with controlled HR and no apparent symptoms while on verapamil 240 mg daily. Amiodarone was added by Dr. Camejo because there was a suspicion that AF might be contributing to pleural effusion.  Normal EF.  History of traumatic fall, type II diabetes, chronic renal insufficiency.    Recommend discontinuation of amiodarone.  Consider repeat Ziopatch monitor if pt is symptomatic.  May have to stop anticoagulation if he continues to have falls. Anticoagulation for AF in the presence of severe renal failure is controversial, with opposite opinion between Cardiology and Nephrology guidelines.    Sign off

## 2020-05-12 NOTE — PROGRESS NOTES
[facility]    Cardiology Progress Note    Date of Service: 05/12/2020     Assessment & Plan   Tc Garcia is a 80 year old male with past medical history of chronic kidney disease, diabetes, hypertension, atrial fibrillation on anticoagulation and a persistent left pleural effusion since he suffered a fall last October resulting in multiple rib fractures, C3 spinous process fracture , T2 fracture, delirium due to alcohol and hemothorax.  This patient was admitted on 5/10/2020 with symptoms of two days of worsening dyspnea and found to be hypoxic in ED with recurrent large left pleural effusion.    1.  Recurrent left pleural effusion requiring thoracentesis  transudative without maglinancy  Pleurex catheter placed  Low protein and albumin   Moderate  pulmonary hypertension with RV dysfunction on recent echo   ?Heart failure with preserved ejection fraction  On Lasix 40mg IV bid  -weight down 10 pounds  HYOPKALEMIC to 2.4  Patient started having left pleural effusion after his mechanical fall and left rib fractures and hemothorax.  It is possible the left effusions are reactive in nature.  However, recent echocardiogram shows elevated RV systolic pressure suggestive of pulmonary hypertension as well as RV dilatation and dysfunction and tricuspid regurgitation.  Therefore, I would like to rule out pulmonary hypertension as cause of his recurrent pleural effusion  - recommend doing right heart catheterization with possible vasodilator study.  If wedge pressure is normal, we can do nitrous oxide challenge versus nitroprusside challenge the wedge pressure is elevated.  He also has elevated N-terminal proBNP.  Right heart catheterization will also assist to rule out heart failure with preserved ejection fraction.  - If there is no significant pulmonary hypertension, we may have to do a transesophageal echocardiogram or cardiac MRI without contrast to assess for tricuspid regurgitation and its  severity.  -apixaban on hold since Sunday  -will have to reassess for RCH until K is repleted especially given QT  Will reassess this after noon    2.  Recurrent or paroxysmal atrial fibrillation and flutter  On apixaban for stroke prophylaxis.  He is also on amiodarone for rhythm control  TSH appears to be mildly elevated.  Even on amiodarone, he is having recurrent atrial fib and flutter  Sinus today on tele but very long QTC  Check yury  EP consult       3.  Chronic renal insufficiency, creatinine stable at 1.5-1.6     4.  Anemia, likely of chronic disease but being worked up.  B12 and folate levels were normal.     5.  Hypertension but BP quite low today /61  Continue present meds.  There may be opportunity to optimize his medications depending on his heart rate and blood pressures here.    6. Hypokalemia to 2.4--replete and recheck        Jada Hazel, MSN, APRN, CNP  [facility]    Interval History   Feeling less short of breath after Left Pleur-X    Review of Systems:  The Review of Systems is negative other than noted in the HPI    Physical Exam   Temp: 97.6  F (36.4  C) Temp src: Oral BP: 93/59 Pulse: 94 Heart Rate: 94 Resp: 18 SpO2: 97 % O2 Device: Nasal cannula Oxygen Delivery: 2 LPM  Vitals:    05/11/20 0101 05/11/20 0138 05/12/20 0409   Weight: 72.6 kg (160 lb) 74.3 kg (163 lb 14.4 oz) 69.3 kg (152 lb 12.8 oz)     Vital Signs with Ranges  Temp:  [97.5  F (36.4  C)-98.6  F (37  C)] 97.6  F (36.4  C)  Pulse:  [56-95] 94  Heart Rate:  [54-95] 94  Resp:  [10-27] 18  BP: ()/(59-91) 93/59  SpO2:  [88 %-97 %] 97 %  I/O last 3 completed shifts:  In: 443 [P.O.:440; I.V.:3]  Out: 2950 [Urine:2950]    Constitutional     alert and oriented, in no acute distress.     Skin     warm and dry to touch    ENT     no pallor or cyanosis    Neck    Supple, JVP normal, no carotid bruit    Chest     no tenderness to palpation     Lungs  Tubular breath sounds bilaterally    Cardiac  regular rhythm, S1  normal, S2 normal, No S3 or S4, no murmurs, no rubs    Abdomen     abdomen soft, bowel sounds normoactive, no hepatosplenomegaly    Extremities and Back     no clubbing, cyanosis. 1-2+ pitting edema observed.        Neurological     no gross motor deficits noted, affect appropriate, oriented to time, person and place.  Bilateral hand tremor which he says is chronic        Medications     heparin       insulin basal rate for inpatient ambulatory pump 0.4 Units/hr (05/11/20 0147)     - MEDICATION INSTRUCTIONS -         amiodarone  200 mg Oral Daily     cloNIDine  0.3 mg Oral BID     furosemide  40 mg Intravenous BID     insulin aspart   Device See Admin Instructions     insulin bolus from AMBULATORY PUMP   Subcutaneous 4x Daily AC & HS     insulin bolus from AMBULATORY PUMP   Subcutaneous TID AC     lovastatin  20 mg Oral At Bedtime     miconazole   Topical BID     piperacillin-tazobactam  3.375 g Intravenous Q6H     potassium chloride with lidocaine  10 mEq Intravenous Q1H     sodium chloride (PF)  3 mL Intracatheter Q8H     umeclidinium  1 puff Inhalation Daily     verapamil ER  240 mg Oral At Bedtime          Data:     ROUTINE IP LABS (Last four results)  BMP  Recent Labs   Lab 05/12/20  0541 05/10/20  2207    141   POTASSIUM 2.4* 3.5   CHLORIDE 103 106   LLOYD 8.0* 8.9   CO2 33* 24   BUN 16 18   CR 1.59* 1.52*   GLC 94 208*     CHOLESTEROL/HEPATIC  Recent Labs   Lab 05/10/20  2207   ALT 29   AST 28     CBC  Recent Labs   Lab 05/12/20  0541 05/10/20  2207   WBC 10.7 18.1*   RBC 3.13* 3.56*   HGB 8.4* 9.5*   HCT 26.1* 30.0*   MCV 83 84   MCH 26.8 26.7   MCHC 32.2 31.7   RDW 18.5* 18.9*    449     TROP:   Recent Labs   Lab 05/10/20  2207   TROPI 0.020      BNP:    Recent Labs   Lab 05/10/20  2207   NTBNPI 7,143*     INR  Recent Labs   Lab 05/10/20  2207   INR 1.49*     TSH   Date Value Ref Range Status   05/12/2020 3.47 0.40 - 4.00 mU/L Final       EKG results:  Reviewed if available     Imaging:  Recent  Results (from the past 24 hour(s))   Echocardiogram Complete    Narrative    [id number]  [id number]  [id number]  [id number]^MARCO ANTONIO^KRYSTIAN^D           [facility]  Echocardiography Laboratory  [address] [place], [place] [address]        Name: CEDRICK PHILLIPS  MRN: [id number]  : 1940  Study Date: 2020 10:47 AM  Age: 80 yrs  Gender: Male  Patient Location: [facility]  Reason For Study: CHF  Ordering Physician: KRYSTIAN BERNARD  Referring Physician: Senthil Moya  Performed By: Janae Hein     BSA: 1.9 m2  Height: 69 in  Weight: 163 lb  HR: 93  BP: 148/78 mmHg  _____________________________________________________________________________  __        Procedure  Complete Portable Echo Adult. Optison (NDC #6842-3121) given intravenously.  _____________________________________________________________________________  __        Interpretation Summary     1. Left ventricular systolic function is normal. The visual ejection fraction  is estimated at 55-60%.  2. No regional wall motion abnormalities noted.  3. The right ventricle is mild to moderately dilated. Mildly decreased right  ventricular systolic function  4. There is mild (1+) tricuspid regurgitation.  5. Moderate pulmonary hypertension; The right ventricular systolic pressure is  approximated at 47mmHg plus the right atrial pressure. IVC diameter and  respiratory changes fall into an intermediate range suggesting an RA pressure  of 8 mmHg.  6. Left pleural effusion is present.  7. In comparison with the prior report, estimated pulmonary pressures have  increased somewhat, otherwise, no significant change.  _____________________________________________________________________________  __        Left Ventricle  The left ventricle is normal in size. There is mild concentric left  ventricular hypertrophy. Left ventricular systolic function is normal. The  visual ejection fraction is estimated at 55-60%. Left ventricular  diastolic  function is normal. No regional wall motion abnormalities noted.     Right Ventricle  The right ventricle is mild to moderately dilated. Mildly decreased right  ventricular systolic function.     Atria  Normal left atrial size. The left atrium is moderately dilated. Right atrial  size is normal. The right atrium is mild to moderately dilated. There is no  color Doppler evidence of an atrial shunt.     Mitral Valve  There is mild mitral annular calcification.        Tricuspid Valve  The tricuspid valve is normal in structure and function. There is mild (1+)  tricuspid regurgitation. The right ventricular systolic pressure is  approximated at 47mmHg plus the right atrial pressure.     Aortic Valve  The aortic valve is trileaflet with aortic valve sclerosis.     Pulmonic Valve  The pulmonic valve is normal in structure and function. There is trace  pulmonic valvular regurgitation.     Vessels  Mild aortic root dilatation. Normal size ascending aorta. IVC diameter and  respiratory changes fall into an intermediate range suggesting an RA pressure  of 8 mmHg.     Pericardium  There is no pericardial effusion. Large left pleural effusion.     _____________________________________________________________________________  __  MMode/2D Measurements & Calculations  IVSd: 1.4 cm  LVIDd: 4.7 cm  LVIDs: 2.5 cm  LVPWd: 1.1 cm  FS: 45.9 %  LV mass(C)d: 226.1 grams  LV mass(C)dI: 119.4 grams/m2     Ao root diam: 3.9 cm  LA dimension: 5.0 cm  asc Aorta Diam: 3.3 cm  LA/Ao: 1.3  LVOT diam: 1.9 cm  LVOT area: 2.9 cm2  LA Volume (BP): 62.0 ml  LA Volume Index (BP): 32.8 ml/m2  RWT: 0.47        Doppler Measurements & Calculations  MV E max twin: 92.1 cm/sec  MV A max twin: 70.3 cm/sec  MV E/A: 1.3  MV dec slope: 375.1 cm/sec2  MV dec time: 0.25 sec     Ao V2 max: 128.7 cm/sec  Ao max P.0 mmHg  Ao V2 mean: 91.9 cm/sec  Ao mean PG: 3.8 mmHg  Ao V2 VTI: 23.3 cm  CAROLYNN(I,D): 2.2 cm2  CAROLYNN(V,D): 2.5 cm2  LV V1 max P.2 mmHg  LV  V1 max: 114.0 cm/sec  LV V1 VTI: 17.7 cm  SV(LVOT): 50.8 ml  SI(LVOT): 26.8 ml/m2  PA acc time: 0.07 sec  TR max williams: 342.1 cm/sec  TR max P.8 mmHg  AV Williams Ratio (DI): 0.89  CAROLYNN Index (cm2/m2): 1.2  E/E' av.9  Lateral E/e': 10.4  Medial E/e': 17.5              _____________________________________________________________________________  __        Report approved by: Naomi Estrada 2020 12:35 PM      IR Chest Tube Drain Tunneled Left    Narrative    IR CHEST TUBE DRAIN TUNNELED LEFT  2020 8:48 AM     HISTORY:  80-year-old male with recurrent large left pleural effusion  requiring frequent thoracenteses due to shortness of breath.    COMPARISON: None.    FINDINGS: After obtaining informed consent, the patient was placed in  a supine position on the fluoroscopy table. The inferolateral thorax  was prepped and draped in the usual sterile manner. 1% lidocaine was  injected for local anesthesia. Under ultrasound guidance, access into  the pleural space was obtained using a 5 Ugandan needle catheter  system. A wire was curled in the pleural space. Over the wire, a 16  Ugandan peel-away sheath was placed. Approximately 10 cm inferior to  the pleural entry site, a small skin incision was made. A 16 [place]an  Pleurx catheter was pulled through this incision to the pleural entry  site.. The catheter was then passed through the peel-away sheath into  the pleural space. A fluoroscopic image was saved to document catheter  position. A thoracentesis was performed. Approximately 600 cc of fluid  was removed. The catheter was sutured to the tunnel entry site using 0  silk in a pursestring manner. The pleural entry site was closed with  deep interrupted stitch using 3-0 Vicryl. This was supplemented with  Dermabond.    I determined this patient to be an appropriate candidate for the  planned sedation and procedure and reassessed the patient immediately  prior to sedation and procedure. Moderate intravenous  conscious  sedation was supervised by me. The patient was independently monitored  by a registered nurse assigned to the Department of radiology using  automated blood pressure, EKG and pulse oximetry. The patient  tolerated the procedure well. There were no immediate postprocedure  complications. The patient's vital signs were monitored by radiology  nursing staff under my supervision and remained stable throughout the  study. Radiation dose for this scan was reduced using automated  exposure control, adjustment of the mA and/or kV according to patient  size, or iterative reconstruction technique.    MEDICATIONS: 1 mg Versed, 50 mg fentanyl    SEDATION TIME: 20 minutes    FLUOROSCOPY TIME: 0.2 minutes    FLUOROSCOPIC DOSE: 1.28 mGy Air Kerma    NUMBER OF FLUOROSCOPIC IMAGES: 1      Impression    IMPRESSION: Ultrasound and fluoroscopic guided tunneled pleural Pleurx  catheter placed as described above.    FRANCY MEDRANO MD     Telemetry:  Paroxysmal atrial fibrillation/flutter/sinus with long QT

## 2020-05-12 NOTE — PLAN OF CARE
Heart Failure Care Pathway  GOALS TO BE MET BEFORE DISCHARGE:    1. Decrease congestion and/or edema with diuretic therapy to achieve near      optimal volume status.            Dyspnea improved:  yes            Edema improved:     no        Net I/O and Weights since admission:          04/12 0700 - 05/12 0659  In: 443 [P.O.:440; I.V.:3]  Out: 4450 [Urine:4450]  Net: -4007            Vitals:    05/11/20 0101 05/11/20 0138   Weight: 72.6 kg (160 lb) 74.3 kg (163 lb 14.4 oz)       2.  O2 sats > 92% on RA or at prior home O2 therapy level.          Current oxygenation status:       SpO2: 93 %         O2 Device: Nasal cannula,  Oxygen Delivery: 1 LPM         Able to wean O2 this shift to keep sats > 92%:  Oxygen applied pt sating mid 80s on room air.  NO  3.  Tolerates ambulation and mobility near baseline: Yes      How many times did the patient ambulate with nursing staff this shift? 6 from bed to bathroom    Please review the Heart Failure Care Pathway for additional HF goal outcomes.    Prudence Tinoco RN RN  5/12/2020       Neuro: Alert and oriented x 4, tremors on the UE  Recent vital signs:VSS  Respiratory: 1 L nasal cannula  Pain:denies  Tele:LS diminished  Activity:SBA   Drips/Drains/IVF:  Skin: +2 BLE  GI/:On low sat fat diet, good output overnight  Aggression color:green  Plan:Test/Consult:  Labs: potassium 2.4 MD notified. Replaced with 40 mEq   Misc:pt has own insulin pump, checks pt blood glucose

## 2020-05-12 NOTE — IR NOTE
IR CHEST TUBE DRAIN TUNNELED LEFT  5/12/2020 8:48 AM     HISTORY:  80-year-old male with recurrent large left pleural effusion requiring frequent thoracenteses due to shortness of breath.    COMPARISON: None.    FINDINGS: After obtaining informed consent, the patient was placed in a supine position on the fluoroscopy table. The inferolateral thorax was prepped and draped in the usual sterile manner. 1% lidocaine was injected for local anesthesia. Under ultrasound guidance, access into the pleural space was obtained using a 5 Macanese needle catheter system. A wire was curled in the pleural space. Over the wire, a 16 Macanese peel-away sheath was placed. Approximately 10 cm inferior to the pleural entry site, a small skin incision was made. A 16 Macanese Pleurx catheter was pulled through this incision to the pleural entry site.. The catheter was then passed through the peel-away sheath into the pleural space. A fluoroscopic image was saved to document catheter position. A thoracentesis was performed. Approximately 600 cc of fluid was removed. The catheter was sutured to the tunnel entry site using 0 silk in a pursestring manner. The pleural entry site was closed with deep interrupted stitch using 3-0 Vicryl. This was supplemented with Dermabond.    I determined this patient to be an appropriate candidate for the planned sedation and procedure and reassessed the patient immediately prior to sedation and procedure. Moderate intravenous conscious sedation was supervised by me. The patient was independently monitored by a registered nurse assigned to the Department of radiology using automated blood pressure, EKG and pulse oximetry. The patient tolerated the procedure well. There were no immediate postprocedure complications. The patient's vital signs were monitored by radiology nursing staff under my supervision and remained stable throughout the study. Radiation dose for this scan was reduced using automated exposure  control, adjustment of the mA and/or kV according to patient size, or iterative reconstruction technique.    MEDICATIONS: 1 mg Versed, 50 mg fentanyl    SEDATION TIME: 20 minutes    FLUOROSCOPY TIME: 0.2 minutes    FLUOROSCOPIC DOSE: 1.28 mGy Air Kerma    NUMBER OF FLUOROSCOPIC IMAGES: 1    IMPRESSION: Ultrasound and fluoroscopic guided tunneled pleural Pleurx catheter placed as described above.

## 2020-05-12 NOTE — PROGRESS NOTES
Potassium remain low after replacement    Will plan on heart cath tomorrow and discussed taking away the DNR/DNI with the patient today    Will reassess tomorrow

## 2020-05-12 NOTE — PROGRESS NOTES
[facility]    Medicine Progress Note - Hospitalist Service       Date of Admission:  5/10/2020  Assessment & Plan   Tc Garcia is a 80 year old male with a history of chronic kidney disease, diabetes, hypertension, atrial fibrillation on anticoagulation and a persistent left pleural effusion since he suffered a fall last October resulting in multiple rib fractures and hemothorax.  He presented to the emergency department with worsening dyspnea on exertion over the past couple of days.  In the emergency department his blood pressure was elevated.  He was hypoxic and required supplemental oxygen.  Chest x-ray showed a large left pleural effusion.  EKG showed sinus rhythm.  His labs were notable for a BNP of 7000 and troponin 0 0.020.  CRP is 39 and procalcitonin 0.38.  He received 2 doses of IV furosemide in the emergency department.  He is being admitted for further treatment.    Acute respiratory failure, hypoxic   Large recurrent left pleural effusion  The patient was treated with IV furosemide in the emergency department.  Unclear if this is simply congestive heart failure.  His white blood cell count, CRP and procalcitonin are all mildly elevated.  He has no fever cough.  He has a persistent effusion since he fell last year resulting in multiple left-sided rib fractures and hemothorax, though the effusion developed months after his injury.  He has undergone 6 thoracenteses.  The last one was about 2 weeks ago.  This showed dark red fluid but was transudate of.  Echocardiogram last fall showed undetermined rhythm with normal LV function, mild to moderately dilated right ventricle with 1-2+ TR.      Continue IV Zosyn for now    Will also consult ID given leukocytosis and equivocal indications of infection on admission, question of whether we should test for less common organisms. Suspicion for infection is lower but at least would like some assistance in choosing to stop antibiotics or choose  "LOT    TTE continues to show normal EF, dilated RV, TR    Discontinued IV lasix after am dose    Pleurex catheter placed in IR this morning. Pleural fluid studies pending.    CT surgery, pulmonology, and cardiology consulting. Appreciate all recommendations.    Right heart cath today    Uncontrolled hypertension   Atrial fibrillation/flutter    Appreciate cardiology and EP recs.    Continue verapamil and clonidine. Discontinue amiodarone.    May need ziopatch on discharge.    Holding apixaban for thoracentesis. Will consider discontinuing indefinitely given falls and CKD.    Chronic kidney disease     Stable, monitor    Hypokalemia    Check magnesium    replete    Type 2 diabetes mellitus      He is on an insulin pump which we will continue    Chronic anemia, normocytic    Iron low, ferritin normal, B12 and folate normal     Diet: NPO per Anesthesia Guidelines for Procedure/Surgery Except for: Meds, Ice Chips    DVT Prophylaxis: Pneumatic Compression Devices  Pruett Catheter: not present  Code Status: DNR           Disposition Plan   Expected discharge: 2 - 3 days, recommended to prior living arrangement once further workup completed.  Entered: Sejal Sylvester MD 05/12/2020, 8:44 AM       The patient's care was discussed with the Patient.    Sejal Sylvester MD  Hospitalist Service  [facility]    ______________________________________________________________________    Interval History   Patient reports 8/10 pain after chest tube placement this morning. He is very pleasant. Anxious to get some answers about why effusions keep happening and happy that workup is moving along. He was frustrated that effusions kept getting drained without further workup and apparently his kids were urging him to insist on further workup so he had been reassuring them by phone this morning that multiple teams were involved on this admission and testing being done. He wanted to know that pleural fluid was sent \"to check " "for cancer\" and I confirmed that cytology was sent, along with other tests.    Data reviewed today: I reviewed all medications, new labs and imaging results over the last 24 hours. I personally reviewed no images or EKG's today.    Physical Exam   Vital Signs: Temp: 97.6  F (36.4  C) Temp src: Oral BP: 119/69 Pulse: 56 Heart Rate: 56 Resp: 18 SpO2: 97 % O2 Device: Nasal cannula Oxygen Delivery: 2 LPM  Weight: 152 lbs 12.8 oz  General Appearance: Alert, oriented, comfortable appearing, pale  Respiratory: diminished in left base  Cardiovascular: irregular  GI: soft NTND  Skin: no rashes or lesions. Chest tube site c/d/i  Neuro: Non focal     Data   Recent Labs   Lab 20  0541 05/10/20  2207   WBC 10.7 18.1*   HGB 8.4* 9.5*   MCV 83 84    449   INR  --  1.49*    141   POTASSIUM 2.4* 3.5   CHLORIDE 103 106   CO2 33* 24   BUN 16 18   CR 1.59* 1.52*   ANIONGAP 6 11   LLOYD 8.0* 8.9   GLC 94 208*   ALBUMIN  --  3.2*   PROTTOTAL  --  6.4*   BILITOTAL  --  1.3   ALKPHOS  --  92   ALT  --  29   AST  --  28   TROPI  --  0.020     Recent Results (from the past 24 hour(s))   Echocardiogram Complete    Narrative    [id number]  [id number]  [id number]  [id number]^MARCO ANTONIO^KRYSTIAN^D           [facility]  Echocardiography Laboratory  [address] [place] [address]        Name: CEDRICK PHILLIPS  MRN: [id number]  : 1940  Study Date: 2020 10:47 AM  Age: 80 yrs  Gender: Male  Patient Location: [facility]  Reason For Study: CHF  Ordering Physician: KRYSTIAN BERNARD  Referring Physician: Senthil Moya  Performed By: Janae Hein     BSA: 1.9 m2  Height: 69 in  Weight: 163 lb  HR: 93  BP: 148/78 mmHg  _____________________________________________________________________________  __        Procedure  Complete Portable Echo Adult. Optison (NDC #[id number]) given intravenously.  _____________________________________________________________________________  __        Interpretation Summary   "   1. Left ventricular systolic function is normal. The visual ejection fraction  is estimated at 55-60%.  2. No regional wall motion abnormalities noted.  3. The right ventricle is mild to moderately dilated. Mildly decreased right  ventricular systolic function  4. There is mild (1+) tricuspid regurgitation.  5. Moderate pulmonary hypertension; The right ventricular systolic pressure is  approximated at 47mmHg plus the right atrial pressure. IVC diameter and  respiratory changes fall into an intermediate range suggesting an RA pressure  of 8 mmHg.  6. Left pleural effusion is present.  7. In comparison with the prior report, estimated pulmonary pressures have  increased somewhat, otherwise, no significant change.  _____________________________________________________________________________  __        Left Ventricle  The left ventricle is normal in size. There is mild concentric left  ventricular hypertrophy. Left ventricular systolic function is normal. The  visual ejection fraction is estimated at 55-60%. Left ventricular diastolic  function is normal. No regional wall motion abnormalities noted.     Right Ventricle  The right ventricle is mild to moderately dilated. Mildly decreased right  ventricular systolic function.     Atria  Normal left atrial size. The left atrium is moderately dilated. Right atrial  size is normal. The right atrium is mild to moderately dilated. There is no  color Doppler evidence of an atrial shunt.     Mitral Valve  There is mild mitral annular calcification.        Tricuspid Valve  The tricuspid valve is normal in structure and function. There is mild (1+)  tricuspid regurgitation. The right ventricular systolic pressure is  approximated at 47mmHg plus the right atrial pressure.     Aortic Valve  The aortic valve is trileaflet with aortic valve sclerosis.     Pulmonic Valve  The pulmonic valve is normal in structure and function. There is trace  pulmonic valvular regurgitation.      Vessels  Mild aortic root dilatation. Normal size ascending aorta. IVC diameter and  respiratory changes fall into an intermediate range suggesting an RA pressure  of 8 mmHg.     Pericardium  There is no pericardial effusion. Large left pleural effusion.     _____________________________________________________________________________  __  MMode/2D Measurements & Calculations  IVSd: 1.4 cm  LVIDd: 4.7 cm  LVIDs: 2.5 cm  LVPWd: 1.1 cm  FS: 45.9 %  LV mass(C)d: 226.1 grams  LV mass(C)dI: 119.4 grams/m2     Ao root diam: 3.9 cm  LA dimension: 5.0 cm  asc Aorta Diam: 3.3 cm  LA/Ao: 1.3  LVOT diam: 1.9 cm  LVOT area: 2.9 cm2  LA Volume (BP): 62.0 ml  LA Volume Index (BP): 32.8 ml/m2  RWT: 0.47        Doppler Measurements & Calculations  MV E max williams: 92.1 cm/sec  MV A max williams: 70.3 cm/sec  MV E/A: 1.3  MV dec slope: 375.1 cm/sec2  MV dec time: 0.25 sec     Ao V2 max: 128.7 cm/sec  Ao max P.0 mmHg  Ao V2 mean: 91.9 cm/sec  Ao mean PG: 3.8 mmHg  Ao V2 VTI: 23.3 cm  CAROLYNN(I,D): 2.2 cm2  CAROLYNN(V,D): 2.5 cm2  LV V1 max P.2 mmHg  LV V1 max: 114.0 cm/sec  LV V1 VTI: 17.7 cm  SV(LVOT): 50.8 ml  SI(LVOT): 26.8 ml/m2  PA acc time: 0.07 sec  TR max williams: 342.1 cm/sec  TR max P.8 mmHg  AV Williams Ratio (DI): 0.89  CAROLYNN Index (cm2/m2): 1.2  E/E' av.9  Lateral E/e': 10.4  Medial E/e': 17.5              _____________________________________________________________________________  __        Report approved by: Naomi Estrada 2020 12:35 PM        Medications     heparin       insulin basal rate for inpatient ambulatory pump 0.4 Units/hr (20 0147)     - MEDICATION INSTRUCTIONS -         cloNIDine  0.3 mg Oral BID     insulin aspart   Device See Admin Instructions     insulin bolus from AMBULATORY PUMP   Subcutaneous 4x Daily AC & HS     insulin bolus from AMBULATORY PUMP   Subcutaneous TID AC     lovastatin  20 mg Oral At Bedtime     miconazole   Topical BID     piperacillin-tazobactam  3.375 g Intravenous  Q6H     potassium chloride with lidocaine  10 mEq Intravenous Q1H     potassium chloride  40 mEq Oral Once     sodium chloride (PF)  3 mL Intracatheter Q8H     umeclidinium  1 puff Inhalation Daily     verapamil ER  240 mg Oral At Bedtime

## 2020-05-12 NOTE — PLAN OF CARE
Patient is up in the room with stand by assist, NPO most off day, Potassium replaced, continue to monitor. Blood sugar was 64, stopped his insuline pump, gave 15 g oral sugar.

## 2020-05-12 NOTE — CONSULTS
"[facility]  EP Cardiology Progress Note  Date of Service: 05/12/2020  Primary Cardiologist: Dr. Camejo       Tc Garcia is a 80 year old male with past medical history significant for chronic kidney disease, diabetes, hypertension, atrial fibrillation on anticoagulation and a persistent left pleural effusion since he suffered a fall last October resulting in multiple rib fractures and hemothorax admitted on 5/10/2020 with worsening shortness of breath.    His chest Xray shows large left pleural effusion with no pneumothorax.    His BNP was 7143, troponin 0.2, WBC 18.      ECHO (5/11/20) revealed EF  55-60%, No regional wall motion abnormalities noted,  right ventricle is mild to moderately dilated. Mildly decreased right ventricular systolic function, mild (1+) tricuspid regurgitation, Moderate pulmonary hypertension; The right ventricular systolic pressure is approximated at 47 mmHg plus the right atrial pressure.   EKG (5/10/2020) reveals sinus rhythm with ventricular rate of 81 bpm, MR=690 ms, QT/BHz=608/439 ms      Today he states he is having daily dizziness which causes him to side step. He attributes this to \"old age\".    He denies falling, presyncope and syncope. States he has had a tremor for the past year.   His heart rate at home is usually in the 90s.        Social history: HE is  and a former smoker.  Family history:  He has a family history of diabetes and heart disease      Assessment  1.  Paroxsymal Atrial fibrillation/Atrial flutter     while hospitalized, having paroxymal atrial fibrillation with controlled ventricular rates      PTA amiodarone, verapamil 240 mg daily - will discontinue amiodarone     Having tremors and TSH=4.39, Free T4=1.51 (4/25/2020)    For anticoagulation for CHADS VASC 4 (age++, HTN, DM) he is taking Eliquis 5 mg twice daily which is on hold for chest tube placement.      Replace electrolytes.      Plan  1. Stop amiodarone  2. Continue " verapamil  3. Due to dizziness prior to admission will need to consider zio patch at discharge          Lesly Garcia APRN CNP  P Heart  Text Page  (M-F 7:30 am - 4:00 pm)    Physical Exam   Temp: 97.6  F (36.4  C) Temp src: Oral BP: 107/61 Pulse: 79 Heart Rate: 79 Resp: 18 SpO2: 97 % O2 Device: None (Room air) Oxygen Delivery: 2 LPM  Vitals:    05/11/20 0101 05/11/20 0138 05/12/20 0409   Weight: 72.6 kg (160 lb) 74.3 kg (163 lb 14.4 oz) 69.3 kg (152 lb 12.8 oz)       GEN:  In general, this is a thin elderly male in no acute distress.  Patient ambulatory.  HEENT:  Pupils normal size, equal, and round. Sclerae nonicteric. Clear oropharynx. Mucous membranes moist,  no cyanosis.  NECK: Supple, no asymmetry, masses, or scars appreciated. Trachea midline.  C/V:  Regular rate and rhythm,   RESP: Respirations are unlabored. No use of accessory muscles. Clear to auscultation bilaterally without wheezing, rales, or rhonchi.  GI: Abdomen soft, nontender, nondistended. No hepatosplenomegaly appreciated. No masses palpable, bowel sounds normal.  EXTREM: 1+ LE edema. No cyanosis or clubbing.  MS: Large joints without obvious swelling or erythema.   VASC: Radial and dorsalis pedis are normal in volumes and symmetric bilaterally.   NEURO: Alert and oriented, cooperative. No obvious focal deficits.   PSYCH: Normal affect.  SKIN: Warm and dry. Left sided chest tube.      Medications     heparin       insulin basal rate for inpatient ambulatory pump 0.4 Units/hr (05/11/20 0147)     - MEDICATION INSTRUCTIONS -         cloNIDine  0.3 mg Oral BID     furosemide  40 mg Intravenous BID     insulin aspart   Device See Admin Instructions     insulin bolus from AMBULATORY PUMP   Subcutaneous 4x Daily AC & HS     insulin bolus from AMBULATORY PUMP   Subcutaneous TID AC     lovastatin  20 mg Oral At Bedtime     miconazole   Topical BID     piperacillin-tazobactam  3.375 g Intravenous Q6H     sodium chloride (PF)  3 mL Intracatheter Q8H      umeclidinium  1 puff Inhalation Daily     verapamil ER  240 mg Oral At Bedtime       Data   Most Recent 3 CBC's:  Recent Labs   Lab Test 05/12/20  0541 05/10/20  2207 04/30/20  0647  04/26/20  0701   WBC 10.7 18.1*  --   --  8.4   HGB 8.4* 9.5* 8.5*   < > 8.4*   MCV 83 84  --   --  85    449  --   --  333    < > = values in this interval not displayed.     Most Recent 3 BMP's:  Recent Labs   Lab Test 05/12/20  0541 05/10/20  2207 04/30/20  0647    141 142   POTASSIUM 2.4* 3.5 3.3*   CHLORIDE 103 106 109   CO2 33* 24 25   BUN 16 18 21   CR 1.59* 1.52* 1.62*   ANIONGAP 6 11 8   LLOYD 8.0* 8.9 8.3*   GLC 94 208* 92     Most Recent 3 Troponin's:  Recent Labs   Lab Test 05/10/20  2207 12/30/19  1714 11/08/19  1525   TROPI 0.020 0.027 0.210*     Most Recent 3 BNP's:  Recent Labs   Lab Test 05/10/20  2207 12/30/19  1714 11/08/19  0658   NTBNPI 7,143* 3,634* 3,253*     Most Recent TSH and T4:  Recent Labs   Lab Test 05/12/20 0541 04/25/20  1228   TSH 3.47 4.39*   T4  --  1.51*

## 2020-05-13 ENCOUNTER — SURGERY (OUTPATIENT)
Age: 80
End: 2020-05-13
Payer: COMMERCIAL

## 2020-05-13 LAB
ANION GAP SERPL CALCULATED.3IONS-SCNC: 9 MMOL/L (ref 3–14)
BUN SERPL-MCNC: 16 MG/DL (ref 7–30)
CALCIUM SERPL-MCNC: 7.7 MG/DL (ref 8.5–10.1)
CHLORIDE SERPL-SCNC: 104 MMOL/L (ref 94–109)
CHOLEST SERPL-MCNC: 116 MG/DL
CO2 BLDCOV-SCNC: 30 MMOL/L (ref 21–28)
CO2 BLDCOV-SCNC: 30 MMOL/L (ref 21–28)
CO2 BLDCOV-SCNC: 31 MMOL/L (ref 21–28)
CO2 SERPL-SCNC: 29 MMOL/L (ref 20–32)
COPATH REPORT: NORMAL
CREAT SERPL-MCNC: 1.55 MG/DL (ref 0.66–1.25)
ERYTHROCYTE [DISTWIDTH] IN BLOOD BY AUTOMATED COUNT: 18.7 % (ref 10–15)
GFR SERPL CREATININE-BSD FRML MDRD: 41 ML/MIN/{1.73_M2}
GLUCOSE BLDC GLUCOMTR-MCNC: 56 MG/DL (ref 70–99)
GLUCOSE BLDC GLUCOMTR-MCNC: 75 MG/DL (ref 70–99)
GLUCOSE SERPL-MCNC: 247 MG/DL (ref 70–99)
HCT VFR BLD AUTO: 26.1 % (ref 40–53)
HDLC SERPL-MCNC: 60 MG/DL
HGB BLD-MCNC: 8.4 G/DL (ref 13.3–17.7)
LDLC SERPL CALC-MCNC: 43 MG/DL
MCH RBC QN AUTO: 27.3 PG (ref 26.5–33)
MCHC RBC AUTO-ENTMCNC: 32.2 G/DL (ref 31.5–36.5)
MCV RBC AUTO: 85 FL (ref 78–100)
NONHDLC SERPL-MCNC: 56 MG/DL
PCO2 BLDV: 52 MM HG (ref 40–50)
PH BLDV: 7.37 PH (ref 7.32–7.43)
PH BLDV: 7.38 PH (ref 7.32–7.43)
PH BLDV: 7.39 PH (ref 7.32–7.43)
PLATELET # BLD AUTO: 295 10E9/L (ref 150–450)
PO2 BLDV: 28 MM HG (ref 25–47)
PO2 BLDV: 38 MM HG (ref 25–47)
PO2 BLDV: 38 MM HG (ref 25–47)
POTASSIUM SERPL-SCNC: 3.6 MMOL/L (ref 3.4–5.3)
RBC # BLD AUTO: 3.08 10E12/L (ref 4.4–5.9)
SAO2 % BLDV FROM PO2: 50 %
SAO2 % BLDV FROM PO2: 70 %
SAO2 % BLDV FROM PO2: 70 %
SODIUM SERPL-SCNC: 142 MMOL/L (ref 133–144)
TRIGL SERPL-MCNC: 65 MG/DL
WBC # BLD AUTO: 9.9 10E9/L (ref 4–11)

## 2020-05-13 PROCEDURE — 36415 COLL VENOUS BLD VENIPUNCTURE: CPT | Performed by: HOSPITALIST

## 2020-05-13 PROCEDURE — 93451 RIGHT HEART CATH: CPT | Mod: 26 | Performed by: INTERNAL MEDICINE

## 2020-05-13 PROCEDURE — 25000125 ZZHC RX 250: Performed by: INTERNAL MEDICINE

## 2020-05-13 PROCEDURE — 99232 SBSQ HOSP IP/OBS MODERATE 35: CPT | Mod: 25 | Performed by: INTERNAL MEDICINE

## 2020-05-13 PROCEDURE — 27210794 ZZH OR GENERAL SUPPLY STERILE: Performed by: INTERNAL MEDICINE

## 2020-05-13 PROCEDURE — 93463 DRUG ADMIN & HEMODYNMIC MEAS: CPT | Mod: GC | Performed by: INTERNAL MEDICINE

## 2020-05-13 PROCEDURE — 80048 BASIC METABOLIC PNL TOTAL CA: CPT | Performed by: HOSPITALIST

## 2020-05-13 PROCEDURE — 21000001 ZZH R&B HEART CARE

## 2020-05-13 PROCEDURE — 80061 LIPID PANEL: CPT | Performed by: HOSPITALIST

## 2020-05-13 PROCEDURE — 93451 RIGHT HEART CATH: CPT | Performed by: INTERNAL MEDICINE

## 2020-05-13 PROCEDURE — 25000132 ZZH RX MED GY IP 250 OP 250 PS 637: Performed by: HOSPITALIST

## 2020-05-13 PROCEDURE — 25000128 H RX IP 250 OP 636: Performed by: HOSPITALIST

## 2020-05-13 PROCEDURE — 82803 BLOOD GASES ANY COMBINATION: CPT

## 2020-05-13 PROCEDURE — 4A023N6 MEASUREMENT OF CARDIAC SAMPLING AND PRESSURE, RIGHT HEART, PERCUTANEOUS APPROACH: ICD-10-PCS | Performed by: INTERNAL MEDICINE

## 2020-05-13 PROCEDURE — 93463 DRUG ADMIN & HEMODYNMIC MEAS: CPT | Performed by: INTERNAL MEDICINE

## 2020-05-13 PROCEDURE — 80061 LIPID PANEL: CPT | Performed by: NURSE PRACTITIONER

## 2020-05-13 PROCEDURE — 85027 COMPLETE CBC AUTOMATED: CPT | Performed by: HOSPITALIST

## 2020-05-13 PROCEDURE — 99232 SBSQ HOSP IP/OBS MODERATE 35: CPT | Performed by: HOSPITALIST

## 2020-05-13 PROCEDURE — 00000146 ZZHCL STATISTIC GLUCOSE BY METER IP

## 2020-05-13 RX ADMIN — PIPERACILLIN AND TAZOBACTAM 3.38 G: 3; .375 INJECTION, POWDER, FOR SOLUTION INTRAVENOUS at 06:56

## 2020-05-13 RX ADMIN — PIPERACILLIN AND TAZOBACTAM 3.38 G: 3; .375 INJECTION, POWDER, FOR SOLUTION INTRAVENOUS at 12:03

## 2020-05-13 RX ADMIN — LIDOCAINE HYDROCHLORIDE 6 ML: 10 INJECTION, SOLUTION EPIDURAL; INFILTRATION; INTRACAUDAL; PERINEURAL at 15:53

## 2020-05-13 RX ADMIN — LOVASTATIN 20 MG: 20 TABLET ORAL at 21:43

## 2020-05-13 RX ADMIN — VERAPAMIL HYDROCHLORIDE 240 MG: 240 TABLET, FILM COATED, EXTENDED RELEASE ORAL at 21:43

## 2020-05-13 RX ADMIN — OXYCODONE HYDROCHLORIDE 5 MG: 5 TABLET ORAL at 08:40

## 2020-05-13 RX ADMIN — ONDANSETRON 4 MG: 2 INJECTION INTRAMUSCULAR; INTRAVENOUS at 12:22

## 2020-05-13 RX ADMIN — CLONIDINE HYDROCHLORIDE 0.3 MG: 0.1 TABLET ORAL at 21:43

## 2020-05-13 RX ADMIN — PIPERACILLIN AND TAZOBACTAM 3.38 G: 3; .375 INJECTION, POWDER, FOR SOLUTION INTRAVENOUS at 01:34

## 2020-05-13 RX ADMIN — CLONIDINE HYDROCHLORIDE 0.3 MG: 0.1 TABLET ORAL at 08:29

## 2020-05-13 RX ADMIN — UMECLIDINIUM 1 PUFF: 62.5 AEROSOL, POWDER ORAL at 08:30

## 2020-05-13 RX ADMIN — MICONAZOLE NITRATE: 20 CREAM TOPICAL at 08:29

## 2020-05-13 RX ADMIN — PIPERACILLIN AND TAZOBACTAM 3.38 G: 3; .375 INJECTION, POWDER, FOR SOLUTION INTRAVENOUS at 17:59

## 2020-05-13 ASSESSMENT — ACTIVITIES OF DAILY LIVING (ADL)
ADLS_ACUITY_SCORE: 15

## 2020-05-13 NOTE — CONSULTS
Consult Date:  05/13/2020      INFECTIOUS DISEASE CONSULTATION      LOCATION:  Room 253, [facility].      REFERRING PHYSICIAN:  Sejal Sylvester MD.      IMPRESSIONS:     1.  An 80-year-old male with several months of progressive right pleural fluid reaccumulation, by fluid analysis and history not likely infected and that is still the case currently.  Unlikely an infection-related problem.  Current cultures negative.   2.  Slight infiltrates on current imaging, possible pneumonia but not impressive.  No major fever, sepsis or significant hypoxia.   3.  Atrial fibrillation.   4.  Chronic kidney disease.   5.  Diabetes mellitus.      RECOMMENDATIONS:   1.  Doubt the pleural fluid is an infection issue, cultures so far negative and clinical course not compatible with infection.   2.  Probably not significant pneumonia either, but as long as here anyway with ongoing cardiac workup, Zosyn and if he goes would do a 5-day course of Augmentin orally just in case some minor pneumonia.      HISTORY OF PRESENT ILLNESS:  This 80-year-old male is seen in consultation due to a concern for possible pneumonia.  The patient's main ongoing problem has been recurrent and ongoing left pleural effusion.  He has had ongoing taps and now has a tube in place with a plan for a PleurX tube.  The patient has had studies done that did not suggest infection, over the 6 months has mostly not had infection symptoms.  He has had some decreased appetite, some slight weight loss and generally not feeling great.  At presentation on this occasion, had some slight feverish symptoms, a slight cough, his white count was 18,000 and procalcitonin minimally elevated.  On imaging, there was a slight airspace abnormality in the right upper lobe.  He has been getting Zosyn.  Cultures so far have been negative.  COVID-19 was negative.      PAST MEDICAL HISTORY:  One or 2 episodes of pneumonia historically, not perceived infection during his  current progressive left pleural effusion problem.  No significant other focal symptoms of infection recently.  History of diabetes and history of chronic mild renal insufficiency.      ALLERGIES:  NONE.      SOCIAL AND FAMILY HISTORY:  No recent travel or exposures.  No one he has been around has been ill.  No history of TB or exposures.      MEDICATIONS:  As listed.      REVIEW OF SYSTEMS:  Minimal cough.  Not feeling fevers, chills or sweats.  Some general malaise and fatigue, although since the tube was placed and the fluid better drained, he feels the best he has felt in some time.       PHYSICAL EXAMINATION:   GENERAL:  The patient appears his stated age.  He is a good historian.   VITAL SIGNS:  All normal.   HEENT:  No thrush or intraoral lesions.   HEART AND LUNGS:  Fairly unremarkable, even the left lung where the tube is in place has okay air movement.   ABDOMEN:  Soft and nontender.   EXTREMITIES:  No rash or skin lesions.      LABORATORY AND IMAGING:  Imaging with a left pleural effusion, has been present for some time.  Slight right upper lobe infiltrate.  White count initially 18,000.  Procalcitonin 0.38.  No positive cultures.         CHEPE ASHTON MD             D: 2020   T: 2020   MT: JUN      Name:     CEDRCIK PHILLIPS   MRN:      [id number]        Account:       [id number]   :      1940           Consult Date:  2020      Document: [id number]

## 2020-05-13 NOTE — CONSULTS
Spoke w/ Rody at [facility] regarding the need for PleurX kit for discharge [facility] .  She states the pt can get the supplies through  [facility], [facility], [facility].  Reference #[id number]    Faxed [employer] form to [phone]    Ordered 2 PleurX kits from [facility] for discharge    Care Transition Initial Assessment - RN        Met with: Patient.  DATA   Principal Problem:    Pulmonary hypertension (H)  Active Problems:    Acute respiratory failure with hypoxia (H)       Cognitive Status: awake, alert and oriented.  Primary Care Clinic Name: [facility]  Primary Care MD Name: Dr. Senthil Moya  Contact information and PCP information verified: Yes  Lives With: spouse         Insurance concerns: No Insurance issues identified  ASSESSMENT  Patient currently receives the following services:  None. Pt states he gets his diabetic supplies from [employer]        Identified issues/concerns regarding health management:  Met w/ patient regarding need for home care due to PleureX drain placement. Pt/family was provided with the Medicare Compare list for Home Care.  Discussed associated Medicare star ratings to assist with choice for referrals/discharge planning; Yes.  Education was given to pt/family that star ratings are updated/maintained by Medicare and can be reviewed by visiting www.medicare.gov; Yes.   Offered patient a choice of home care agencies. Pt would like to use [facility]. Pt understands they must be homebound. Pt informed of the plan and in agreement with the plan. [facility] given heads up regarding pending discharge.   Home Care phone number placed on discharge instructions.     PLAN  Financial costs for the patient include copays and supplies.  Patient given options and choices for discharge; yes.  Patient/family is agreeable to the plan?  Yes:   Patient anticipates discharging to Home with [facility] .         Patient anticipates needs for home equipment: No  Transportation/person available to transport on day of discharge  is Radha and have they been notified/set up TBD  Plan/Disposition: Home   Appointments: TBD  Wants appointment Made with Dr. Senthil Moya or Dr. Davis; Prefers late morning appointments.   Care  (CTS) will continue to follow as needed.      Maile Owen RN BAN  Inpatient Care Coordination  [address] [address]  [email or web address]   Office: [phone]  Fax: [phone]

## 2020-05-13 NOTE — PROGRESS NOTES
[facility]  EP Cardiology Progress Note  Date of Service: 05/13/2020  Primary Cardiologist:  Dr Camejo      Tc Garcia is a 80 year old male with past medical history significant for chronic kidney disease, diabetes, hypertension, atrial fibrillation on anticoagulation and a persistent left pleural effusion since he suffered a fall last October resulting in multiple rib fractures and hemothorax admitted on 5/10/2020 with worsening shortness of breath.       Interval History  VS reviewed.  Pt is in sinus bradycardia with prolonged QT and labile blood pressures.       Today patient feels great.        Assessment   Paroxsymal Atrial fibrillation/Atrial flutter    while hospitalized, having paroxymal atrial fibrillation with controlled ventricular rates .  Patient is asymptomatic. Amiodarone was used to suppress afib as it was thought to possibly contribute to patient's pleural effusions.      PTA amiodarone, verapamil 240 mg daily - due to side effects including increase free T4, tremors and QT prolongation - - amiodarone was discontinued    Having tremors and TSH=4.39, Free T4=1.51 (4/25/2020)    For anticoagulation for CHADS VASC 4 (age++, HTN, DM) he is taking Eliquis 5 mg twice daily which is on hold for chest tube placement.              Prolonged QT    ECG on 5/12/20 revealed sinus bradycardia with ventricular rate of 49 bpm, XI=321, QT/TZh=673/576 ms.  When comparing to previous ECGs he has had QTc of 505 ms in the past.  This is likely from amiodarone which was discontinued on 5/12/20    Plan  1. Continue verapamil.  Expect patient's heart rate to slowly increase due to discontinuing amiodarone   2. Due to dizziness prior to admission will need to consider zio patch at discharge  3. ECG for 05/14/20 and continue telemetry  4. Continue monitor QT/QTc with periodic ECGs as outpatient.    5. EP will sign off         OMAR Vazquez CNP  [facility]  Text Page  (M-F 7:30 am - 4:00  pm)        Physical Exam   Temp: 97.9  F (36.6  C) Temp src: Oral BP: (!) 144/77 Pulse: 65 Heart Rate: 62 Resp: 18 SpO2: 97 % O2 Device: None (Room air)    Vitals:    05/11/20 0138 05/12/20 0409 05/13/20 0247   Weight: 74.3 kg (163 lb 14.4 oz) 69.3 kg (152 lb 12.8 oz) 68.4 kg (150 lb 11.2 oz)       GEN:  In general, this is a thin elderly male in no acute distress.  Patient ambulatory.  HEENT:  Pupils normal size, equal, and round. Sclerae nonicteric. Clear oropharynx. Mucous membranes moist,  no cyanosis.  NECK: Supple, no asymmetry, masses, or scars appreciated. Trachea midline.  C/V:  Regular rate and rhythm,   RESP: Respirations are unlabored. No use of accessory muscles. Clear to auscultation bilaterally without wheezing, rales, or rhonchi.  GI: Abdomen soft, nontender, nondistended. No hepatosplenomegaly appreciated. No masses palpable, bowel sounds normal.  EXTREM: 1+ LE edema. No cyanosis or clubbing.  MS: Large joints without obvious swelling or erythema.   VASC: Radial and dorsalis pedis are normal in volumes and symmetric bilaterally.   NEURO: Alert and oriented, cooperative. No obvious focal deficits.   PSYCH: Normal affect.  SKIN: Warm and dry. Left sided chest tube.      Medications     insulin basal rate for inpatient ambulatory pump 0.4 Units/hr (05/11/20 0147)     - MEDICATION INSTRUCTIONS -       - MEDICATION INSTRUCTIONS -         cloNIDine  0.3 mg Oral BID     insulin aspart   Device See Admin Instructions     insulin bolus from AMBULATORY PUMP   Subcutaneous 4x Daily AC & HS     insulin bolus from AMBULATORY PUMP   Subcutaneous TID AC     lovastatin  20 mg Oral At Bedtime     miconazole   Topical BID     piperacillin-tazobactam  3.375 g Intravenous Q6H     sodium chloride (PF)  3 mL Intracatheter Q8H     umeclidinium  1 puff Inhalation Daily     verapamil ER  240 mg Oral At Bedtime       Data   Most Recent 3 CBC's:  Recent Labs   Lab Test 05/13/20  0553 05/12/20  0541 05/10/20  2207   WBC 9.9  10.7 18.1*   HGB 8.4* 8.4* 9.5*   MCV 85 83 84    322 449     Most Recent 3 BMP's:  Recent Labs   Lab Test 05/13/20  0553 05/12/20  1208 05/12/20  0541 05/10/20  2207     --  142 141   POTASSIUM 3.6 3.0* 2.4* 3.5   CHLORIDE 104  --  103 106   CO2 29  --  33* 24   BUN 16  --  16 18   CR 1.55*  --  1.59* 1.52*   ANIONGAP 9  --  6 11   LLOYD 7.7*  --  8.0* 8.9   *  --  94 208*     Most Recent 3 Troponin's:  Recent Labs   Lab Test 05/10/20  2207 12/30/19  1714 11/08/19  1525   TROPI 0.020 0.027 0.210*     Most Recent 3 BNP's:  Recent Labs   Lab Test 05/10/20  2207 12/30/19  1714 11/08/19  0658   NTBNPI 7,143* 3,634* 3,253*

## 2020-05-13 NOTE — PLAN OF CARE
Patient is up in the room SBA  with walker sat in the chair for 2 hours, plural drain output is 150 for 8  hours. Gave pain medications once. R heart cath this afternoon. NPO all day.   Heart Failure Care Pathway  GOALS TO BE MET BEFORE DISCHARGE:    1. Decrease congestion and/or edema with diuretic therapy to achieve near      optimal volume status.            Dyspnea improved:  Yes            Edema improved:     Yes        Net I/O and Weights since admission:          04/13 1500 - 05/13 1459  In: 1192 [P.O.:680; I.V.:512]  Out: 6955 [Urine:6075; Drains:880]  Net: -5763            Vitals:    05/11/20 0101 05/11/20 0138 05/12/20 0409 05/13/20 0247   Weight: 72.6 kg (160 lb) 74.3 kg (163 lb 14.4 oz) 69.3 kg (152 lb 12.8 oz) 68.4 kg (150 lb 11.2 oz)       2.  O2 sats > 92% on RA or at prior home O2 therapy level.          Current oxygenation status:       SpO2: 97 %         O2 Device: None (Room air),            Able to wean O2 this shift to keep sats > 92%:  Yes       Does patient use Home O2? No    3.  Tolerates ambulation and mobility near baseline: Yes        How many times did the patient ambulate with nursing staff this shift? 1    Please review the Heart Failure Care Pathway for additional HF goal outcomes.    Romelia Luevano RN RN  5/13/2020

## 2020-05-13 NOTE — CONSULTS
ID consult dictated IMP 1 79 yo persistent L pleural effusion , felt multifactorial, not felt infected and current data/cx still support that, minimal cough, no fever , sl new infiltrate and WBc 18 K, suspect no infection but /    REC 1 WBc now nl, cx fluid neg zosyn while here short course augmentin if home and hold on bigger MALDONADO

## 2020-05-13 NOTE — PLAN OF CARE
A&OX4, VSS on RA. Denies SOB, chest pain, n&V. Pain at the Pleural drain site reported. Treated with tylenol and Oxy + cold application, effective. Up AX1 with GB and walker. Pt is tremulous especially in the upper extremity. BG checked with pt's own device and self managing insulin pump. BG at HS of 243. Plan for right heart cath tomorrow. On cardiac diet but NPO after midnight. Pleural drain with yellow/ serous drain, output has slowed down a lot. Drain site c/d/I. Tele has been SR. K+ has been replaced, recheck in the AM. Continue to monitor.

## 2020-05-13 NOTE — PROVIDER NOTIFICATION
MD Notification    Notified Person: MD    Notified Person Name: Dr. Silva     Notification Date/Time: 5/13 1657     Notification Interaction: text page     Purpose of Notification: Please place post heart cath procedure orders    Orders Received: no call back. Dr. Zamora called. - No post procedure orders needed.     Comments:

## 2020-05-13 NOTE — PROGRESS NOTES
A&O x4. VSS on room air. Tele SR. Denies CP/SOB/pain. Up with standby assist and walker. PleurX to left chest, capped at 1800. Plan to educate patient tomorrow on how to use pleurx drainage system. Heart cath completed today, R internal jugular site CDI.  Plan for possible discharge tomorrow.

## 2020-05-13 NOTE — PLAN OF CARE
VSS. No complaints of pain. Pt hypoglycemic during the night, stopped insulin pump and followed hypoglycemic protocol. Chest tube in place. Sating well on RA.

## 2020-05-13 NOTE — PROGRESS NOTES
[facility]    Cardiology Progress Note    Date of Service: 05/13/2020     Assessment & Plan   Tc Garcia is a 80 year old male with past medical history of chronic kidney disease, diabetes, hypertension, atrial fibrillation on anticoagulation and a persistent left pleural effusion since he suffered a fall last October resulting in multiple rib fractures, C3 spinous process fracture , T2 fracture, delirium due to alcohol and hemothorax.  This patient was admitted on 5/10/2020 with symptoms of two days of worsening dyspnea and found to be hypoxic in ED with recurrent large left pleural effusion.    1.  Recurrent left pleural effusion requiring thoracentesis  transudative without maglinancy  Pleurex catheter placed  Low protein and albumin   Moderate  pulmonary hypertension with RV dysfunction on recent echo   etiology possibly Heart failure with preserved ejection fraction  -diuresed with IV lasix  -weight down 13 pounds  HYOPKALEMIA improved  Patient started having left pleural effusion after his mechanical fall and left rib fractures and hemothorax.  It is possible the left effusions are reactive in nature.  However, recent echocardiogram shows elevated RV systolic pressure suggestive of pulmonary hypertension as well as RV dilatation and dysfunction and tricuspid regurgitation.  Therefore, I would like to rule out pulmonary hypertension as cause of his recurrent pleural effusion  - recommend doing right heart catheterization with possible vasodilator study.  If wedge pressure is normal, we can do nitrous oxide challenge versus nitroprusside challenge the wedge pressure is elevated.  He also has elevated N-terminal proBNP.  Right heart catheterization will also assist to rule out heart failure with preserved ejection fraction.  - If there is no significant pulmonary hypertension, we may have to do a transesophageal echocardiogram or cardiac MRI without contrast to assess for tricuspid  "regurgitation and its severity.  -apixaban on hold since Sunday  - RHC  K 3.6--will proceed with RHC today  Remove DNR status periprocedure  He fully agrees  The risks and benefits of right heart catheterization with or without vasodilator study including risks of (0.1% -0.3%) bleeding, infection, death, arrhythmias, pulmonary artery rupture, etc were discussed with patient and he agrees to proceed with it.      2.  Recurrent or paroxysmal atrial fibrillation and flutter  On apixaban for stroke prophylaxis.  He is also on amiodarone for rhythm control  TSH appears to be mildly elevated.  Even on amiodarone, he is having recurrent atrial fib and flutter  Sinus today on tele but very long QTC  Check yury  EP consult       3.  Chronic renal insufficiency, creatinine stable at 1.5-1.6     4.  Anemia, likely of chronic disease but being worked up.  B12 and folate levels were normal.  Hemoglobin 8.4     5.  Hypertension   Catapres 0.3mg twice daily  Verapamil 240mg daily  May need meds optimized post heart cath results  BP higher but now 130-140    6. Hypokalemia improved      7. Dyslipidemia  On Mevacor  Add panel    8. Long QT  Amiodarone stopped yesterday by EPS  Magnesium normal       Jada Hazel, MSN, APRN, CNP  [facility]    Interval History   Feeling less short of breath after Left Pleur-X  \"the best I have felt in 6 months\"    Review of Systems:  The Review of Systems is negative other than noted in the HPI    Physical Exam   Temp: 97.9  F (36.6  C) Temp src: Oral BP: (!) 144/77 Pulse: 65 Heart Rate: 62 Resp: 16 SpO2: 97 % O2 Device: None (Room air) Oxygen Delivery: 2 LPM  Vitals:    05/11/20 0138 05/12/20 0409 05/13/20 0247   Weight: 74.3 kg (163 lb 14.4 oz) 69.3 kg (152 lb 12.8 oz) 68.4 kg (150 lb 11.2 oz)     Vital Signs with Ranges  Temp:  [97.5  F (36.4  C)-98  F (36.7  C)] 97.9  F (36.6  C)  Pulse:  [56-95] 65  Heart Rate:  [56-95] 62  Resp:  [10-27] 16  BP: ()/(59-81) 144/77  SpO2:  [92 " %-100 %] 97 %  I/O last 3 completed shifts:  In: 543 [P.O.:240; I.V.:303]  Out: 1995 [Urine:1275; Drains:720]    Constitutional     alert and oriented, in no acute distress.     Skin     warm and dry to touch    ENT     no pallor or cyanosis    Neck    Supple, JVP normal, no carotid bruit    Chest     no tenderness to palpation     Lungs  Tubular breath sounds bilaterally at bases    Cardiac  regular rhythm, S1 normal, S2 normal, No S3 or S4, no murmurs, no rubs    Abdomen     abdomen soft, bowel sounds normoactive, no hepatosplenomegaly    Extremities and Back     no clubbing, cyanosis. Trace to 1+ pitting edema observed.        Neurological     no gross motor deficits noted, affect appropriate, oriented to time, person and place.  Bilateral hand tremor which he says is chronic        Medications     insulin basal rate for inpatient ambulatory pump 0.4 Units/hr (05/11/20 0147)     - MEDICATION INSTRUCTIONS -       - MEDICATION INSTRUCTIONS -         cloNIDine  0.3 mg Oral BID     insulin aspart   Device See Admin Instructions     insulin bolus from AMBULATORY PUMP   Subcutaneous 4x Daily AC & HS     insulin bolus from AMBULATORY PUMP   Subcutaneous TID AC     lovastatin  20 mg Oral At Bedtime     miconazole   Topical BID     piperacillin-tazobactam  3.375 g Intravenous Q6H     sodium chloride (PF)  3 mL Intracatheter Q8H     umeclidinium  1 puff Inhalation Daily     verapamil ER  240 mg Oral At Bedtime          Data:     ROUTINE IP LABS (Last four results)  BMP  Recent Labs   Lab 05/13/20  0553 05/12/20  1208 05/12/20  0541 05/10/20  2207     --  142 141   POTASSIUM 3.6 3.0* 2.4* 3.5   CHLORIDE 104  --  103 106   LLOYD 7.7*  --  8.0* 8.9   CO2 29  --  33* 24   BUN 16  --  16 18   CR 1.55*  --  1.59* 1.52*   *  --  94 208*     CHOLESTEROL/HEPATIC  Recent Labs   Lab 05/10/20  2207   ALT 29   AST 28     CBC  Recent Labs   Lab 05/13/20  0553 05/12/20  0541 05/10/20  2207   WBC 9.9 10.7 18.1*   RBC 3.08*  3.13* 3.56*   HGB 8.4* 8.4* 9.5*   HCT 26.1* 26.1* 30.0*   MCV 85 83 84   MCH 27.3 26.8 26.7   MCHC 32.2 32.2 31.7   RDW 18.7* 18.5* 18.9*    322 449     TROP:   Recent Labs   Lab 05/10/20  2207   TROPI 0.020      BNP:    Recent Labs   Lab 05/10/20  2207   NTBNPI 7,143*     INR  Recent Labs   Lab 05/10/20  2207   INR 1.49*     TSH   Date Value Ref Range Status   05/12/2020 3.47 0.40 - 4.00 mU/L Final       EKG results:  Reviewed if available     Imaging:  Recent Results (from the past 24 hour(s))   IR Chest Tube Drain Tunneled Left    Narrative    IR CHEST TUBE DRAIN TUNNELED LEFT  5/12/2020 8:48 AM     HISTORY:  80-year-old male with recurrent large left pleural effusion  requiring frequent thoracenteses due to shortness of breath.    COMPARISON: None.    FINDINGS: After obtaining informed consent, the patient was placed in  a supine position on the fluoroscopy table. The inferolateral thorax  was prepped and draped in the usual sterile manner. 1% lidocaine was  injected for local anesthesia. Under ultrasound guidance, access into  the pleural space was obtained using a 5 [place] citizen needle catheter  system. A wire was curled in the pleural space. Over the wire, a 16  [place] citizen peel-away sheath was placed. Approximately 10 cm inferior to  the pleural entry site, a small skin incision was made. A 16 [place] citizen  Pleurx catheter was pulled through this incision to the pleural entry  site.. The catheter was then passed through the peel-away sheath into  the pleural space. A fluoroscopic image was saved to document catheter  position. A thoracentesis was performed. Approximately 600 cc of fluid  was removed. The catheter was sutured to the tunnel entry site using 0  silk in a pursestring manner. The pleural entry site was closed with  deep interrupted stitch using 3-0 Vicryl. This was supplemented with  Dermabond.    I determined this patient to be an appropriate candidate for the  planned sedation and procedure and  reassessed the patient immediately  prior to sedation and procedure. Moderate intravenous conscious  sedation was supervised by me. The patient was independently monitored  by a registered nurse assigned to the Department of radiology using  automated blood pressure, EKG and pulse oximetry. The patient  tolerated the procedure well. There were no immediate postprocedure  complications. The patient's vital signs were monitored by radiology  nursing staff under my supervision and remained stable throughout the  study. Radiation dose for this scan was reduced using automated  exposure control, adjustment of the mA and/or kV according to patient  size, or iterative reconstruction technique.    MEDICATIONS: 1 mg Versed, 50 mg fentanyl    SEDATION TIME: 20 minutes    FLUOROSCOPY TIME: 0.2 minutes    FLUOROSCOPIC DOSE: 1.28 mGy Air Kerma    NUMBER OF FLUOROSCOPIC IMAGES: 1      Impression    IMPRESSION: Ultrasound and fluoroscopic guided tunneled pleural Pleurx  catheter placed as described above.    FRANCY MEDRANO MD     Telemetry:  Paroxysmal atrial fibrillation/flutter/sinus with long QT

## 2020-05-13 NOTE — PROGRESS NOTES
"PULMONOLOGY PROGRESS NOTE    Date of Admission: 5/10/2020    CC/Reason for Hospital visit: recurrent unilateral pleural effusion  SUBJECTIVE      PleurX/TPC placed 5/12. Feeling better. On room air. Just returned from RHC. PleurX output slowing.      ROS: A Problem Pertinent review of systems was negative except for items noted in HPI.  Past Medical, Family, and Social/Substance History has been reviewed: No interval changes.   OBJECTIVE   Vital signs:  Temp: 97.9  F (36.6  C) Temp src: Oral BP: 139/84   Heart Rate: 61 Resp: 16 SpO2: 93 % O2 Device: None (Room air) Oxygen Delivery: 2 LPM   Weight: 68.4 kg (150 lb 11.2 oz)  Estimated body mass index is 22.25 kg/m  as calculated from the following:    Height as of 4/25/20: 1.753 m (5' 9\").    Weight as of this encounter: 68.4 kg (150 lb 11.2 oz).        I/O last 3 completed shifts:  In: 446 [P.O.:240; I.V.:206]  Out: 900 [Urine:700; Drains:200]      CONSTITUTIONAL/GENERAL APPEARANCE: Alert male. No Apparent Distress.  PSYCHIATRIC: Pleasant and appropriate mood and affect. Oriented x 3.  EARS, NOSE,THROAT,MOUTH: External ears and nose overall normal. Normal oral mucosa.   NECK: Neck appearance normal. No neck masses and the thyroid is not enlarged.   RESPIRATORY: Non-labored effort. TPC on left, draining serosang fluid  CARDIOVASCULAR: irr      LABORATORY ASSESSMENT    Arterial Blood GasNo lab results found in last 7 days.  CBC  Recent Labs   Lab 05/13/20 0553 05/12/20  0541 05/10/20  2207   WBC 9.9 10.7 18.1*   RBC 3.08* 3.13* 3.56*   HGB 8.4* 8.4* 9.5*   HCT 26.1* 26.1* 30.0*   MCV 85 83 84   MCH 27.3 26.8 26.7   MCHC 32.2 32.2 31.7   RDW 18.7* 18.5* 18.9*    322 449     BMP  Recent Labs   Lab 05/13/20  0553 05/12/20  1208 05/12/20  0541 05/10/20  2207     --  142 141   POTASSIUM 3.6 3.0* 2.4* 3.5   CHLORIDE 104  --  103 106   LLOYD 7.7*  --  8.0* 8.9   CO2 29  --  33* 24   BUN 16  --  16 18   CR 1.55*  --  1.59* 1.52*   *  --  94 208* "     INR  Recent Labs   Lab 05/10/20  2207   INR 1.49*      BNPNo lab results found in last 7 days.  VENOUS BLOOD GASES  Recent Labs   Lab 05/13/20  1614 05/13/20  1609 05/13/20  1602   PHV 7.37 7.39 7.38   PCO2V 52* 52* 52*   PO2V 38 38 28   HCO3V 30* 31* 30*         Additional labs and/or comments:     Pleural labs: transudate      IMAGING      CT Chest 5/11  1.  Large left and small right pleural effusions of water density  2.  Airspace abnormalities in the right upper lobe are nonspecific and can be seen with infection or aspiration pneumonia.    PFT & OTHER TESTING       ASSESSMENT / PLAN      Pulmonary diagnoses:  Abnl CT/CXR R91.8  Pleural effusion  Resp fail acute J96.00        ASSESSMENT : 80M with recurrent unilateral pleural effusion. Has had 6 thoras since 12/30/19. Initial labs with transudate. Suspect multifactorial in nature with ckd, poor nutrition, prior thoras with at least partially trapped lung following initial insult. PleurX/TPC placed 5/12. Repeat labs with transudate. Doing well with TPC. Will cap tonight and use vacuum bottles tomorrow. May be able to discharge to home tomorrow. Would suggest draining ~ q3d and recording output and telehealth followup with me in 2 weeks. Dr Gonzalez will see tomorrow.       Jhon Olivarez  [facility]  Office: [phone]  Pager: [phone]

## 2020-05-13 NOTE — PROGRESS NOTES
[facility]    Medicine Progress Note - Hospitalist Service       Date of Admission:  5/10/2020  Assessment & Plan   Tc Garcia is a 80 year old male with a history of chronic kidney disease, diabetes, hypertension, atrial fibrillation on anticoagulation and a persistent left pleural effusion since he suffered a fall last October resulting in multiple rib fractures and hemothorax.  He presented to the emergency department with worsening dyspnea on exertion over the past couple of days.  In the emergency department his blood pressure was elevated.  He was hypoxic and required supplemental oxygen.  Chest x-ray showed a large left pleural effusion.  EKG showed sinus rhythm.  His labs were notable for a BNP of 7000 and troponin 0 0.020.  CRP is 39 and procalcitonin 0.38.  He received 2 doses of IV furosemide in the emergency department.  He is being admitted for further treatment.    Acute respiratory failure, hypoxic   Large recurrent left pleural effusion  The patient was treated with IV furosemide in the emergency department.  Unclear if this is simply congestive heart failure.  His white blood cell count, CRP and procalcitonin are all mildly elevated.  He has no fever cough.  He has a persistent effusion since he fell last year resulting in multiple left-sided rib fractures and hemothorax, though the effusion developed months after his injury.  He has undergone 6 thoracenteses.  The last one was about 2 weeks ago.  This showed dark red fluid but was transudate of.  Echocardiogram last fall showed undetermined rhythm with normal LV function, mild to moderately dilated right ventricle with 1-2+ TR.      Continue IV Zosyn for now. Per ID, infection doubted but 5 days Augmentin.    TTE continues to show normal EF, dilated RV, TR    Pleurex catheter placed in IR 5/12. Still draining. Pleural fluid studies pending.    CT surgery, pulmonology, and cardiology consulting. Appreciate all  recommendations.    Right heart cath today    Held lasix yesterday pm to avoid persistent hypokalemia    Uncontrolled hypertension   Atrial fibrillation/flutter    Appreciate cardiology and EP recs.    Continue verapamil and clonidine. Discontinue amiodarone.    May need ziopatch on discharge.    Holding apixaban for thoracentesis. Will consider discontinuing indefinitely given falls and CKD.    Chronic kidney disease     Stable, monitor    Hypokalemia    Stable today    Type 2 diabetes mellitus      He is on an insulin pump which we will continue. Blood sugars have been variable given NPO status for procedure and delay of procedure. Managing as best we can. Giving apple juice for hypoglycemia.    Chronic anemia, normocytic    Iron low, ferritin normal, B12 and folate normal     Diet: NPO per Anesthesia Guidelines for Procedure/Surgery Except for: Meds    DVT Prophylaxis: Pneumatic Compression Devices  Pruett Catheter: not present  Code Status: Full Code During Procedure           Disposition Plan   Expected discharge: 2 - 3 days, recommended to prior living arrangement once further workup completed. PT/OT consulted today.  Entered: Sejal Sylvester MD 05/13/2020, 9:05 AM       The patient's care was discussed with the Patient.    Sejal Sylvester MD  Hospitalist Service  [facility]    ______________________________________________________________________    Interval History   Patient feeling much better today. Still having significant drainage from     Data reviewed today: I reviewed all medications, new labs and imaging results over the last 24 hours. I personally reviewed no images or EKG's today.    Physical Exam   Vital Signs: Temp: 97.9  F (36.6  C) Temp src: Oral BP: (!) 144/77 Pulse: 65 Heart Rate: 62 Resp: 16 SpO2: 97 % O2 Device: None (Room air)    Weight: 150 lbs 11.2 oz  General Appearance: Alert, oriented, comfortable appearing, pale  Respiratory: diminished in left  base  Cardiovascular: irregular  GI: soft NTND  Skin: no rashes or lesions. Chest tube site c/d/i  Neuro: Non focal     Data   Recent Labs   Lab 05/13/20  0553 05/12/20  1208 05/12/20  0541 05/10/20  2207   WBC 9.9  --  10.7 18.1*   HGB 8.4*  --  8.4* 9.5*   MCV 85  --  83 84     --  322 449   INR  --   --   --  1.49*     --  142 141   POTASSIUM 3.6 3.0* 2.4* 3.5   CHLORIDE 104  --  103 106   CO2 29  --  33* 24   BUN 16  --  16 18   CR 1.55*  --  1.59* 1.52*   ANIONGAP 9  --  6 11   LLOYD 7.7*  --  8.0* 8.9   *  --  94 208*   ALBUMIN  --   --   --  3.2*   PROTTOTAL  --   --   --  6.4*   BILITOTAL  --   --   --  1.3   ALKPHOS  --   --   --  92   ALT  --   --   --  29   AST  --   --   --  28   TROPI  --   --   --  0.020     No results found for this or any previous visit (from the past 24 hour(s)).  Medications     insulin basal rate for inpatient ambulatory pump 0.4 Units/hr (05/11/20 0147)     - MEDICATION INSTRUCTIONS -       - MEDICATION INSTRUCTIONS -         cloNIDine  0.3 mg Oral BID     insulin aspart   Device See Admin Instructions     insulin bolus from AMBULATORY PUMP   Subcutaneous 4x Daily AC & HS     insulin bolus from AMBULATORY PUMP   Subcutaneous TID AC     lovastatin  20 mg Oral At Bedtime     miconazole   Topical BID     piperacillin-tazobactam  3.375 g Intravenous Q6H     sodium chloride (PF)  3 mL Intracatheter Q8H     umeclidinium  1 puff Inhalation Daily     verapamil ER  240 mg Oral At Bedtime

## 2020-05-14 ENCOUNTER — APPOINTMENT (OUTPATIENT)
Dept: OCCUPATIONAL THERAPY | Facility: CLINIC | Age: 80
DRG: 286 | End: 2020-05-14
Attending: HOSPITALIST
Payer: COMMERCIAL

## 2020-05-14 ENCOUNTER — APPOINTMENT (OUTPATIENT)
Dept: PHYSICAL THERAPY | Facility: CLINIC | Age: 80
DRG: 286 | End: 2020-05-14
Attending: HOSPITALIST
Payer: COMMERCIAL

## 2020-05-14 VITALS
DIASTOLIC BLOOD PRESSURE: 88 MMHG | SYSTOLIC BLOOD PRESSURE: 142 MMHG | WEIGHT: 150.8 LBS | HEART RATE: 65 BPM | OXYGEN SATURATION: 97 % | TEMPERATURE: 98 F | BODY MASS INDEX: 22.27 KG/M2 | RESPIRATION RATE: 16 BRPM

## 2020-05-14 PROBLEM — N18.30 CRF (CHRONIC RENAL FAILURE), STAGE 3 (MODERATE) (H): Status: ACTIVE | Noted: 2020-05-14

## 2020-05-14 PROBLEM — I48.92 ATRIAL FIBRILLATION/FLUTTER (H): Status: ACTIVE | Noted: 2020-05-14

## 2020-05-14 PROBLEM — J90 PLEURAL EFFUSION: Status: ACTIVE | Noted: 2019-12-30

## 2020-05-14 PROBLEM — D64.9 ANEMIA: Status: ACTIVE | Noted: 2020-05-14

## 2020-05-14 PROBLEM — D63.8 ANEMIA OF CHRONIC DISEASE: Status: ACTIVE | Noted: 2020-05-14

## 2020-05-14 LAB — GLUCOSE BLDC GLUCOMTR-MCNC: 279 MG/DL (ref 70–99)

## 2020-05-14 PROCEDURE — 97165 OT EVAL LOW COMPLEX 30 MIN: CPT | Mod: GO

## 2020-05-14 PROCEDURE — 00000146 ZZHCL STATISTIC GLUCOSE BY METER IP

## 2020-05-14 PROCEDURE — 25000132 ZZH RX MED GY IP 250 OP 250 PS 637: Performed by: INTERNAL MEDICINE

## 2020-05-14 PROCEDURE — 25000132 ZZH RX MED GY IP 250 OP 250 PS 637: Performed by: NURSE PRACTITIONER

## 2020-05-14 PROCEDURE — 97116 GAIT TRAINING THERAPY: CPT | Mod: GP | Performed by: PHYSICAL THERAPIST

## 2020-05-14 PROCEDURE — 97161 PT EVAL LOW COMPLEX 20 MIN: CPT | Mod: GP | Performed by: PHYSICAL THERAPIST

## 2020-05-14 PROCEDURE — 99239 HOSP IP/OBS DSCHRG MGMT >30: CPT | Performed by: INTERNAL MEDICINE

## 2020-05-14 PROCEDURE — 25000132 ZZH RX MED GY IP 250 OP 250 PS 637: Performed by: HOSPITALIST

## 2020-05-14 PROCEDURE — 99232 SBSQ HOSP IP/OBS MODERATE 35: CPT | Mod: 24 | Performed by: INTERNAL MEDICINE

## 2020-05-14 PROCEDURE — 93005 ELECTROCARDIOGRAM TRACING: CPT

## 2020-05-14 PROCEDURE — 25000128 H RX IP 250 OP 636: Performed by: HOSPITALIST

## 2020-05-14 PROCEDURE — 93010 ELECTROCARDIOGRAM REPORT: CPT | Performed by: INTERNAL MEDICINE

## 2020-05-14 PROCEDURE — 97535 SELF CARE MNGMENT TRAINING: CPT | Mod: GO

## 2020-05-14 RX ORDER — SILDENAFIL CITRATE 20 MG/1
20 TABLET ORAL 3 TIMES DAILY
Qty: 20 TABLET | Refills: 1 | Status: SHIPPED | OUTPATIENT
Start: 2020-05-14 | End: 2020-05-21

## 2020-05-14 RX ORDER — TADALAFIL 5 MG/1
5 TABLET ORAL EVERY 24 HOURS
Qty: 30 TABLET | Refills: 0 | Status: SHIPPED | OUTPATIENT
Start: 2020-05-14 | End: 2020-05-14

## 2020-05-14 RX ORDER — ACETAMINOPHEN 325 MG/1
650 TABLET ORAL EVERY 4 HOURS PRN
Refills: 0
Start: 2020-05-14 | End: 2020-08-11 | Stop reason: DRUGHIGH

## 2020-05-14 RX ORDER — TADALAFIL 5 MG/1
5 TABLET ORAL EVERY 24 HOURS
Status: DISCONTINUED | OUTPATIENT
Start: 2020-05-14 | End: 2020-05-14 | Stop reason: HOSPADM

## 2020-05-14 RX ADMIN — TADALAFIL 5 MG: 5 TABLET, FILM COATED ORAL at 14:21

## 2020-05-14 RX ADMIN — APIXABAN 2.5 MG: 2.5 TABLET, FILM COATED ORAL at 12:47

## 2020-05-14 RX ADMIN — PIPERACILLIN AND TAZOBACTAM 3.38 G: 3; .375 INJECTION, POWDER, FOR SOLUTION INTRAVENOUS at 05:25

## 2020-05-14 RX ADMIN — CLONIDINE HYDROCHLORIDE 0.3 MG: 0.1 TABLET ORAL at 08:45

## 2020-05-14 RX ADMIN — PIPERACILLIN AND TAZOBACTAM 3.38 G: 3; .375 INJECTION, POWDER, FOR SOLUTION INTRAVENOUS at 00:51

## 2020-05-14 RX ADMIN — PIPERACILLIN AND TAZOBACTAM 3.38 G: 3; .375 INJECTION, POWDER, FOR SOLUTION INTRAVENOUS at 12:47

## 2020-05-14 RX ADMIN — UMECLIDINIUM 1 PUFF: 62.5 AEROSOL, POWDER ORAL at 08:45

## 2020-05-14 ASSESSMENT — ACTIVITIES OF DAILY LIVING (ADL)
ADLS_ACUITY_SCORE: 16
ADLS_ACUITY_SCORE: 17
ADLS_ACUITY_SCORE: 17
ADLS_ACUITY_SCORE: 16
ADLS_ACUITY_SCORE: 17

## 2020-05-14 NOTE — PROGRESS NOTES
[facility]    Cardiology Progress Note    Date of Service: 05/14/2020     Assessment & Plan   Tc Garcia is a 80 year old male with past medical history of chronic kidney disease, diabetes, hypertension, atrial fibrillation on anticoagulation and a persistent left pleural effusion since he suffered a fall last October resulting in multiple rib fractures, C3 spinous process fracture , T2 fracture, delirium due to alcohol and hemothorax.  This patient was admitted on 5/10/2020 with symptoms of two days of worsening dyspnea and found to be hypoxic in ED with recurrent large left pleural effusion.    1.  Recurrent left pleural effusion requiring thoracentesis after mechanical fall but RHC yesterday shows moderate pulmonary hypertension with low normal cardiac index PVR 39 Woods units  Markedly positive vasodilator study with normalization of PA pressures  PVR down to 1.4 Woods units   Add Cialis 5mg daily  Follow-up arranged with Dr. Bonilla in PH clinic next week  -transudative without maglinancy  Pleurex catheter placed--attempting to  drain every three days as OP  Follow up at Pulmonary  Low protein and albumin   -diuresed with IV lasix  -weight down 13 pounds  HYOPKALEMIA improved      2.  Recurrent or paroxysmal atrial fibrillation and flutter  On apixaban for stroke prophylaxis-needs to be restarted at 2.5mg twice daily based on creatinine 1.5 or over and age  TSH appears to be mildly elevated.  Even on amiodarone, he is having recurrent atrial fib and flutter    Sinus today  tele but very long QTC  EP stopped amiodarone  Refer back to them if symptomatic atrial fibrillation       3.  Chronic renal insufficiency, creatinine stable at 1.5-1.6     4.  Anemia, likely of chronic disease but being worked up.  B12 and folate levels were normal.  Hemoglobin 8.4     5.  Hypertension -controllled  Catapres 0.3mg twice daily  Verapamil 240mg daily  -137    6. Hypokalemia improved   K 3.6     7.  "Dyslipidemia  On Mevacor  LDL 43  HDL 60    8. Long QT  Amiodarone stopped by EP  Magnesium normal       Jada Hazel, MSN, APRN, CNP  [facility]    Interval History   Feeling less short of breath after Left Pleur-X  \"the best I have felt in 6 months\"    Review of Systems:  The Review of Systems is negative other than noted in the HPI    Physical Exam   Temp: 98  F (36.7  C) Temp src: Oral BP: (!) (P) 142/88   Heart Rate: (P) 69 Resp: 16 SpO2: (!) (P) 88 % O2 Device: (P) None (Room air)    Vitals:    05/12/20 0409 05/13/20 0247 05/14/20 0500   Weight: 69.3 kg (152 lb 12.8 oz) 68.4 kg (150 lb 11.2 oz) 68.4 kg (150 lb 12.8 oz)     Vital Signs with Ranges  Temp:  [97.7  F (36.5  C)-98.5  F (36.9  C)] 98  F (36.7  C)  Heart Rate:  [51-69] (P) 69  Resp:  [16] 16  BP: (103-141)/(52-84) (P) 142/88  SpO2:  [88 %-100 %] (P) 88 %  I/O last 3 completed shifts:  In: 206 [I.V.:206]  Out: 680 [Urine:500; Drains:180]    Constitutional     alert and oriented, in no acute distress.     Skin     warm and dry to touch    ENT     no pallor or cyanosis    Neck    Supple, JVP not overtly elevated today, no carotid bruit  Right internal jugular site clean    Chest     no tenderness to palpitation     Lungs  Tubular breath sounds bilaterally at bases    Cardiac  regular rhythm, S1 normal, S2 normal, No S3 or S4, no murmurs, no rubs    Abdomen     abdomen soft, bowel sounds normoactive, no hepatosplenomegaly    Extremities and Back     no clubbing, cyanosis. Trace to 1+ pitting edema observed.        Neurological     no gross motor deficits noted, affect appropriate, oriented to time, person and place.  Bilateral hand tremor which he says is chronic        Medications     insulin basal rate for inpatient ambulatory pump 0.4 Units/hr (05/11/20 0147)     - MEDICATION INSTRUCTIONS -         cloNIDine  0.3 mg Oral BID     insulin aspart   Device See Admin Instructions     insulin bolus from AMBULATORY PUMP   Subcutaneous 4x Daily AC " & HS     insulin bolus from AMBULATORY PUMP   Subcutaneous TID AC     lovastatin  20 mg Oral At Bedtime     miconazole   Topical BID     piperacillin-tazobactam  3.375 g Intravenous Q6H     sodium chloride (PF)  3 mL Intracatheter Q8H     tadalafil  5 mg Oral Q24H     umeclidinium  1 puff Inhalation Daily     verapamil ER  240 mg Oral At Bedtime          Data:     ROUTINE IP LABS (Last four results)  BMP  Recent Labs   Lab 05/13/20 0553 05/12/20  1208 05/12/20  0541 05/10/20  2207     --  142 141   POTASSIUM 3.6 3.0* 2.4* 3.5   CHLORIDE 104  --  103 106   LLOYD 7.7*  --  8.0* 8.9   CO2 29  --  33* 24   BUN 16  --  16 18   CR 1.55*  --  1.59* 1.52*   *  --  94 208*     CHOLESTEROL/HEPATIC  Recent Labs   Lab 05/13/20 0553 05/10/20  2207   CHOL 116  --    TRIG 65  --    LDL 43  --    HDL 60  --    ALT  --  29   AST  --  28     CBC  Recent Labs   Lab 05/13/20 0553 05/12/20 0541 05/10/20  2207   WBC 9.9 10.7 18.1*   RBC 3.08* 3.13* 3.56*   HGB 8.4* 8.4* 9.5*   HCT 26.1* 26.1* 30.0*   MCV 85 83 84   MCH 27.3 26.8 26.7   MCHC 32.2 32.2 31.7   RDW 18.7* 18.5* 18.9*    322 449     TROP:   Recent Labs   Lab 05/10/20  2207   TROPI 0.020      BNP:    Recent Labs   Lab 05/10/20  2207   NTBNPI 7,143*     INR  Recent Labs   Lab 05/10/20  2207   INR 1.49*     TSH   Date Value Ref Range Status   05/12/2020 3.47 0.40 - 4.00 mU/L Final       EKG results:  Reviewed if available     Imaging:  Recent Results (from the past 24 hour(s))   Cardiac Catheterization    Narrative    1.  Moderate pulmonary hypertension with normal wedge pressure and low   normal cardiac index at baseline.  Calculated baseline PVR equals 3.9   Kennedy units.  2.  Markedly positive vasodilator study with normalization of PA   pressures, improvement of cardiac index to normal, and decrease of PVR to   1.4 Woods units at 20 ppm inhaled NO.         Telemetry:  Paroxysmal atrial fibrillation/flutter/sinus with long QT

## 2020-05-14 NOTE — DISCHARGE SUMMARY
[facility]    Discharge Summary  Hospitalist    Date of Admission:  5/10/2020  Date of Discharge:  5/14/2020  Discharging Provider: Shaun Villa MD    Discharge Diagnoses   Principal Problem:    Acute respiratory failure with hypoxia -- multifactorial (effusion, pulm hypertension)      Recurrent Left Pleural effusion -- S/P Pleurx Cath 5/12/20      Pulmonary hypertension -- moderate, possibly 2nd to COPD    -- started on Sildenafil 20 mg tid      COPD    Active Problems:    DM type 2, Hgb A1C 8.2 on 4/25/20      Essential hypertension      CRF (chronic renal failure), stage 3       Anemia of chronic disease      Atrial fibrillation/flutter -- on Eliquis and Amiodarone      History of Present Illness   80 year old male who has a history of type 2 diabetes, chronic kidney disease hypertension and atrial fibrillation for which he is anticoagulated with apixaban.  Last October he had a fall which resulted in multiple left-sided rib fractures and left hemothorax.  This required chest tube placement.  Since then he has had a persistent left pleural effusion and is undergone 6 thoracenteses.  Last time was on April 29.  2 L of dark red fluid were removed.  The fluid was transudate.    Now presented to the [facility] emergency department complaining of increasing dyspnea on exertion.  He has not had a fever, cough, chills, chest pain or significant dyspnea at rest.  He is noted some lower extremity edema.  In the emergency room his initial blood pressure was 212/102, temperature 98.2  F, heart rate 93, respiratory rate 25 and oxygen saturation 94%.  He had diminished breath sounds at the left base.  He had 2+ lower extremity edema.  EKG showed sinus rhythm.  Chest x-ray showed a large left pleural effusion.  Labs notable for white count 18,000 troponin 0 0.020 hemoglobin 9.5 g creatinine 1.5 and INR 1.5.  He was given 40 mg of IV furosemide x2 doses.    Hospital Course   Admitted to cardiac  telemetry and seen by Cardiology and Pulmonary.  Given recurrent effusions, he did have a left Pleurx catheter placed, and will be seen by home care nurse and have it drained every 3 days and prn, and he will record the volume.  Suspect this effusion is related to prior left hemothorax, otherwise it is idiopathic.  Cytology now and 12.2019 were negative for malignancy.  Analysis showed 103 WBC's, 72% lymphs, 21% Neutrophils, protein 2.1 -- all consistent with benign transudate.  He did get IV Zosyn for possible infection, but stopped at time of discharge.  He was seen by ID who was indifferent about antibiotics so they were discontinued.      Seen by Cardiology, Amiodarone discontinued as patient still in intermittent afib/flutter, and because of prolonged QTc interval.  He had the effusion prior to starting the Amiodarone, so the effusion is not suspected to be related to Amiodarone.     Did have cardiac echo which showed:     1. Left ventricular systolic function is normal. The visual ejection fraction  is estimated at 55-60%.  2. No regional wall motion abnormalities noted.  3. The right ventricle is mild to moderately dilated. Mildly decreased right  ventricular systolic function  4. There is mild (1+) tricuspid regurgitation.  5. Moderate pulmonary hypertension; The right ventricular systolic pressure is  approximated at 47mmHg plus the right atrial pressure. IVC diameter and  respiratory changes fall into an intermediate range suggesting an RA pressure  of 8 mmHg.  6. Left pleural effusion is present.  7. In comparison with the prior report, estimated pulmonary pressures have  increased somewhat, otherwise, no significant change.    Also had Right heart Cath which showed PAP 51/15 with mean pressure of 28 consistent with moderate pulmonary hypertension, and Dr. Collins suggested Cialis 5 mg daily -- but not covered by insurance, so instead switched to Sildenafil 20 mg tid.  He will follow-up with Senthil Moya  in 1 week, and then follow-up with Dr. Collins in next 1-2 weeks as scheduled.    Potassium was down to 2.4, and replaced and at 3.6 at discharge, with creatinine at discharge stable at 1.55.       Shaun Villa MD, MD  Pager: [phone]  Cell Phone:  [phone]       Significant Results and Procedures   As above    Pending Results   These results will be followed up by Dr. Villa  Unresulted Labs Ordered in the Past 30 Days of this Admission     Date and Time Order Name Status Description    5/12/2020 0834 Fluid Culture Aerobic Bacterial Preliminary           Code Status   Full Code       Primary Care Physician   Senthil Moya    Physical Exam   Temp: 98  F (36.7  C) Temp src: Oral BP: (!) 142/88   Heart Rate: 69 Resp: 16 SpO2: 97 % O2 Device: None (Room air)    Vitals:    05/12/20 0409 05/13/20 0247 05/14/20 0500   Weight: 69.3 kg (152 lb 12.8 oz) 68.4 kg (150 lb 11.2 oz) 68.4 kg (150 lb 12.8 oz)     Vital Signs with Ranges  Temp:  [97.7  F (36.5  C)-98.5  F (36.9  C)] 98  F (36.7  C)  Heart Rate:  [51-69] 69  Resp:  [16] 16  BP: (103-142)/(52-88) 142/88  SpO2:  [88 %-100 %] 97 %  I/O last 3 completed shifts:  In: 206 [I.V.:206]  Out: 680 [Urine:500; Drains:180]    Exam on discharge:   Lungs clear  CV with reg S1 S2  Ankles with trace edema bilaterally    Discharge Disposition   Discharged to home  Condition at discharge: Good    Consultations This Hospital Stay   THORACIC SURGERY IP CONSULT  PULMONARY IP CONSULT  CARDIOLOGY IP CONSULT  ELECTROPHYSIOLOGY IP CONSULT  INFECTIOUS DISEASES IP CONSULT  CARE TRANSITION RN/SW IP CONSULT  PHYSICAL THERAPY ADULT IP CONSULT  OCCUPATIONAL THERAPY ADULT IP CONSULT    Time Spent on this Encounter   I spent a total of 35 minutes discharging this patient.     Discharge Orders     Discharge Medications   Current Discharge Medication List      START taking these medications    Details   acetaminophen (TYLENOL) 325 MG tablet Take 2 tablets (650 mg) by mouth every 4  hours as needed for mild pain  Qty:  , Refills: 0    Associated Diagnoses: Pain      insulin aspart (NovoLOG) inject - to fill ambulatory pump Use as directed  Refills: 0    Associated Diagnoses: Type 2 diabetes, HbA1c goal < 7% (H)      sildenafil (REVATIO) 20 MG tablet Take 1 tablet (20 mg) by mouth 3 times daily  Qty: 20 tablet, Refills: 1    Associated Diagnoses: Pulmonary hypertension (H)         CONTINUE these medications which have NOT CHANGED    Details   apixaban ANTICOAGULANT (ELIQUIS) 5 MG tablet Take 1/2 tablet (2.5mg) by mouth twice daily. You will have your labs checked on 5/4/20 and your PCP will direct you when it is safe to increase your dose back to 1 tablet (5mg) twice daily.    Associated Diagnoses: Chronic atrial fibrillation      cloNIDine (CATAPRES) 0.3 MG tablet Take 0.3 mg by mouth 2 times daily      glucagon 1 MG kit 1 mg as needed for low blood sugar      INSULIN PUMP - OUTPATIENT Novolog Insulin  BASAL RATES and times:  Midnight to 6am: 0.65 units/hr  6am to 10:30 pm: 0.95 units/hour    10:30pm to midnight: 0.55 units/hr  CARB RATIO: 1 unit per 15 grams  Correction Factor (Sensitivity): 55mg/dL  BLOOD GLUCOSE TARGET and times:  12   AM (midnight): 100 - 140  5:30     AM:  100 - 120  10   PM:  100 - 140  Active Insulin Time:  5 hours   Bolus-Correction 1 unit(s) to lower blood glucose by 55 mg/dL  Checks insulin about 4-5 times a day manually  (Per Rx, pt uses 50-60 units/day)      LOVASTATIN 20 MG PO TABS 1 TABLET DAILY AT DINNER  Qty: 90 Tab, Refills: 0    Associated Diagnoses: Mixed hyperlipidemia      nystatin (MYCOSTATIN) 133665 UNIT/GM external cream Apply topically 2 times daily      tiotropium (SPIRIVA) 18 MCG inhaled capsule Inhale 18 mcg into the lungs daily      verapamil ER (VERELAN) 240 MG 24 hr capsule Take 1 capsule (240 mg) by mouth At Bedtime    Associated Diagnoses: Atrial fibrillation, unspecified type (H)         STOP taking these medications       amiodarone (PACERONE)  200 MG tablet Comments:   Reason for Stopping:             Allergies   Allergies   Allergen Reactions     No Known Drug Allergies      Data   Most Recent 3 CBC's:  Recent Labs   Lab Test 05/13/20  0553 05/12/20  0541 05/10/20  2207   WBC 9.9 10.7 18.1*   HGB 8.4* 8.4* 9.5*   MCV 85 83 84    322 449      Most Recent 3 BMP's:  Recent Labs   Lab Test 05/13/20  0553 05/12/20  1208 05/12/20  0541 05/10/20  2207     --  142 141   POTASSIUM 3.6 3.0* 2.4* 3.5   CHLORIDE 104  --  103 106   CO2 29  --  33* 24   BUN 16  --  16 18   CR 1.55*  --  1.59* 1.52*   ANIONGAP 9  --  6 11   LLOYD 7.7*  --  8.0* 8.9   *  --  94 208*     Most Recent 2 LFT's:  Recent Labs   Lab Test 05/10/20  2207 04/25/20  1228   AST 28 21   ALT 29 21   ALKPHOS 92 79   BILITOTAL 1.3 0.4     Most Recent INR's and Anticoagulation Dosing History:  Anticoagulation Dose History     Recent Dosing and Labs Latest Ref Rng & Units 10/30/2019 10/31/2019 11/7/2019 12/31/2019 2/26/2020 4/16/2020 5/10/2020    INR 0.86 - 1.14 1.40(H) 1.37(H) 1.44(H) 1.65(H) 1.28(H) 1.88(H) 1.49(H)        Most Recent 3 Troponin's:  Recent Labs   Lab Test 05/10/20  2207 12/30/19  1714 11/08/19  1525   TROPI 0.020 0.027 0.210*     Most Recent Cholesterol Panel:  Recent Labs   Lab Test 05/13/20  0553   CHOL 116   LDL 43   HDL 60   TRIG 65     Most Recent 6 Bacteria Isolates From Any Culture (See EPIC Reports for Culture Details):  Recent Labs   Lab Test 05/12/20  1058 12/31/19  1008 11/07/19  1105 10/31/19  2144 10/31/19  2141   CULT Culture negative monitoring continues No growth 10,000 to 50,000 colonies/mL  Candida albicans / dubliniensis  Candida albicans and Candida dubliniensis are not routinely speciated  Susceptibility testing not routinely done  * No growth No growth     Most Recent TSH, T4 and A1c Labs:  Recent Labs   Lab Test 05/12/20  0541 04/25/20  1228   TSH 3.47 4.39*   T4  --  1.51*   A1C  --  8.2*

## 2020-05-14 NOTE — PLAN OF CARE
Physical Therapy Discharge Summary    Reason for therapy discharge:    Discharged to home.    Progress towards therapy goal(s). See goals on Care Plan in [facility] electronic health record for goal details.  Goals partially met.  Barriers to achieving goals:   discharge from facility.    Therapy recommendation(s):    Patient to continue with standing ex that he already has at home to improve balance.

## 2020-05-14 NOTE — PROGRESS NOTES
"   05/14/20 0935   Quick Adds   Type of Visit Initial PT Evaluation   Living Environment   Lives With spouse   Living Arrangements house   Home Accessibility stairs to enter home;stairs within home   Number of Stairs, Main Entrance 3   Stair Railings, Main Entrance railings safe and in good condition   Number of Stairs, Within Home, Primary other (see comments)  (13 to his bedroom/bathroom and to basement. )   Stair Railings, Within Home, Primary railings on both sides of stairs   Transportation Anticipated car, drives self;family or friend will provide   Living Environment Comment Patient normally does the shopping but son in law doing it since COVID 19. Patient does the cooking.    Self-Care   Usual Activity Tolerance excellent   Regular Exercise Yes  (\"As much as I can remember to do it. )   Equipment Currently Used at Home grab bar, tub/shower  (Has a walker and tub bench but doesn't use either. )   Functional Level Prior   Ambulation 0-->independent   Transferring 0-->independent   Toileting 0-->independent   Bathing 0-->independent   Communication 0-->understands/communicates without difficulty   Swallowing 0-->swallows foods/liquids without difficulty   Cognition 0 - no cognition issues reported   Fall history within last six months yes   Prior Functional Level Comment Initially fall on Halloween when he fell down 4 steps.    General Information   Onset of Illness/Injury or Date of Surgery - Date 05/10/20   Referring Physician Sejal Sylvester   Patient/Family Goals Statement To go home   Pertinent History of Current Problem (include personal factors and/or comorbidities that impact the POC) Per chart An 80-year-old male with several months of progressive right pleural fluid reaccumulation, by fluid analysis and history not likely infected and that is still the case currently.  Unlikely an infection-related problem.    Precautions/Limitations fall precautions   General Observations Very pleasant.    Cognitive " Status Examination   Orientation orientation to person, place and time   Level of Consciousness alert   Follows Commands and Answers Questions 100% of the time   Personal Safety and Judgment intact   Memory intact   Pain Assessment   Patient Currently in Pain   (Denied pain with all activity. )   Posture    Posture Forward head position;Protracted shoulders   Range of Motion (ROM)   ROM Comment Neck range limited in extension and bilateral rotation. No deficits noted in UE's or LE's.    Strength   Strength Comments Not formally tested but no deficits noted with functional mobility.    Bed Mobility   Bed Mobility Comments Independent   Transfer Skills   Transfer Comments Independent    Gait   Gait Comments Gait in room 10 feet with mild unsteadiness to the right at time. Able to self correct.    Balance   Balance Comments Good static and dynamic sitting balance and good static standing balance. Impaired dynamic standing balance especially with turns and head turns.    General Therapy Interventions   Planned Therapy Interventions balance training;gait training   Clinical Impression   Criteria for Skilled Therapeutic Intervention yes, treatment indicated   PT Diagnosis Mildly impaired dynamic balance   Influenced by the following impairments Impaired balance   Functional limitations due to impairments Mild imbalance with head turns and distractions.    Clinical Presentation Stable/Uncomplicated   Clinical Presentation Rationale Based on eval    Clinical Decision Making (Complexity) Low complexity   Therapy Frequency Daily   Predicted Duration of Therapy Intervention (days/wks) Anticipate patient leaving today but if still here tomorrow would benefit from dynamic balance ex.    Anticipated Equipment Needs at Discharge   (He has a walker already. )   Anticipated Discharge Disposition Home with Assist  (Assist for IADL's such as grocery shopping and driving. )   Risk & Benefits of therapy have been explained Yes  "  Patient, Family & other staff in agreement with plan of care Yes   [facility] AM-PAC TM \"6 Clicks\"   2016, Trustees of [facility], under license to [employer].  All rights reserved.   6 Clicks Short Forms Basic Mobility Inpatient Short Form   [facility] AM-PAC  \"6 Clicks\" V.2 Basic Mobility Inpatient Short Form   1. Turning from your back to your side while in a flat bed without using bedrails? 4 - None   2. Moving from lying on your back to sitting on the side of a flat bed without using bedrails? 4 - None   3. Moving to and from a bed to a chair (including a wheelchair)? 4 - None   4. Standing up from a chair using your arms (e.g., wheelchair, or bedside chair)? 4 - None   5. To walk in hospital room? 3 - A Little   6. Climbing 3-5 steps with a railing? 4 - None   Basic Mobility Raw Score (Score out of 24.Lower scores equate to lower levels of function) 23   Total Evaluation Time   Total Evaluation Time (Minutes) 14     "

## 2020-05-14 NOTE — PLAN OF CARE
"Discharge Planner PT   Patient plan for discharge: Home with assist of wife if needed. His son in law has been getting their groceries since stay at home orders started.   Current status: Patient seen for initial eval and treatment initiated. Patient lives in a 2 story home with his wife. He is independent with all mobility and ADL's. He does use a grab bar for bathing but no other AD. He has a walker at home but doesn't use \"I don't like that thing.\" Has a history of 1 fall in the past 6 months that has resulted in multiple admissions. Currently patient is independent with bed mobility and transfers. He tolerated amb 500 feet without AD without c/o SOB. Patient is unsteady with turns, head turns and when distracted but able to recover his balance independently with an extra step. He was able to go up and down a flight of steps with 2 rails modified independent. Discussed using his walker when he goes outside the home where there are many distractions and he agreed. Also educated him on standing balance ex. He already has a copy of ex from prior admission and agrees to start these ex again.   Barriers to return to prior living situation: none  Recommendations for discharge: Home with use of ww when he goes outside the home and assist of family for IAD's like shopping (son in law already doing).   Rationale for recommendations: Patient is able to demonstrate independence with functional mobility. Recommend use of walker outside of home due to occasional instability. He already has balance ex at home that he is familiar with and can resume.        Entered by: Nadia Mejia 05/14/2020 10:15 AM      "

## 2020-05-14 NOTE — PROGRESS NOTES
[facility]  Met with pt to discuss plans for HC.  Pt to be discharged home today and has agreed to have [facility] provide Skilled Nursing services. Patient care support center processing referral.  Pt verbalized understanding that initial visit is scheduled for Saturday 5/16/20. Pt has 24 hour phone number for [facility] for any questions or concerns.

## 2020-05-14 NOTE — PROGRESS NOTES
Orders for CBC and BMP faxed to patient's PCP ([facility]) at: [phone].    The following appointments have been made and added to AVS:    May 18, 2020 10:15 AM CDT   Video Visit with Joanna Bonilla MD   [facility] ([facility]) [address]   [place] [id number] [phone] OPT 2     Follow up with primary care provider, Senthil Moya, in 1 week, check CBC and BMP then, and review if any problems with the Pleurex catheter. (Dr. Moya is not available, so you will have follow up with Dr. Finch)     FOLLOW UP WITH DR FINCH ON Thursday, MAY 21ST AT 11:30AM IN CLINIC.   [facility]   [address]   [place] [place] [facility] [address]     Follow Up with Pulmonary in 2-3 weeks.  **Appointment on Friday, June 5th via TELEPHONE OR TELEMED at 10:45 AM with Dr. Aguilera.  **THE CLINIC WILL CONTACT YOU ABOUT ACCESSING TELEMED APPOINTMENT.     [facility]   Office: [phone]     Alesha Westbrook RN  Care Coordinator  [facility]  [phone]

## 2020-05-14 NOTE — PROGRESS NOTES
[facility]  Infectious Disease Progress Note          Assessment and Plan:   IMPRESSIONS:     1.  An 80-year-old male with several months of progressive right pleural fluid reaccumulation, by fluid analysis and history not likely infected and that is still the case currently.  Unlikely an infection-related problem.  Current cultures negative.   2.  Slight infiltrates on current imaging, possible pneumonia but not impressive.  No major fever, sepsis or significant hypoxia.   3.  Atrial fibrillation.   4.  Chronic kidney disease.   5.  Diabetes mellitus.      RECOMMENDATIONS:   1.  Doubt the pleural fluid is an infection issue, cultures  negative and clinical course not compatible with infection.   2.  Probably not significant pneumonia either, but as long as here anyway with ongoing cardiac workup, Zosyn OK 5-day course of Augmentin orally just in case some minor pneumonia when disposition ready.              Interval History:   no new complaints and doing well; no cp, sob, n/v/d, or abd pain. Feels best in mos no fever no + cxs              Medications:       cloNIDine  0.3 mg Oral BID     insulin aspart   Device See Admin Instructions     insulin bolus from AMBULATORY PUMP   Subcutaneous 4x Daily AC & HS     insulin bolus from AMBULATORY PUMP   Subcutaneous TID AC     lovastatin  20 mg Oral At Bedtime     miconazole   Topical BID     piperacillin-tazobactam  3.375 g Intravenous Q6H     sodium chloride (PF)  3 mL Intracatheter Q8H     tadalafil  5 mg Oral Q24H     umeclidinium  1 puff Inhalation Daily     verapamil ER  240 mg Oral At Bedtime                  Physical Exam:   Blood pressure 106/52, pulse 65, temperature 98  F (36.7  C), temperature source Oral, resp. rate 16, weight 68.4 kg (150 lb 12.8 oz), SpO2 (P) 91 %.  Wt Readings from Last 2 Encounters:   05/14/20 68.4 kg (150 lb 12.8 oz)   04/30/20 72.6 kg (160 lb 1.6 oz)     Vital Signs with Ranges  Temp:  [97.7  F (36.5  C)-98.5  F  (36.9  C)] 98  F (36.7  C)  Heart Rate:  [51-68] 51  Resp:  [16] 16  BP: (103-141)/(52-84) 106/52  SpO2:  [91 %-95 %] (P) 91 %    Constitutional: Awake, alert, cooperative, no apparent distress   Lungs: Clear to auscultation bilaterally, no crackles or wheezing pleurex tube in   Cardiovascular: Regular rate and rhythm, normal S1 and S2, and no murmur noted   Abdomen: Normal bowel sounds, soft, non-distended, non-tender   Skin: No rashes, no cyanosis, no edema   Other:           Data:   All microbiology laboratory data reviewed.  Recent Labs   Lab Test 05/13/20  0553 05/12/20  0541 05/10/20  2207   WBC 9.9 10.7 18.1*   HGB 8.4* 8.4* 9.5*   HCT 26.1* 26.1* 30.0*   MCV 85 83 84    322 449     Recent Labs   Lab Test 05/13/20  0553 05/12/20  0541 05/10/20  2207   CR 1.55* 1.59* 1.52*     No lab results found.  Recent Labs   Lab Test 05/12/20  1058 12/31/19  1008 11/07/19  1105 10/31/19  2144 10/31/19  2141   CULT Culture negative monitoring continues No growth 10,000 to 50,000 colonies/mL  Candida albicans / dubliniensis  Candida albicans and Candida dubliniensis are not routinely speciated  Susceptibility testing not routinely done  * No growth No growth

## 2020-05-14 NOTE — PROGRESS NOTES
Writer visited as resource RN. Educated pt on pleur-ex, set up and use of product. Pt demonstrated set up successful access for drainage. 400ccs of izabel, cloudy, frothy fluid emptied into reservoir.  Pt demonstrated sterile dressing change.

## 2020-05-14 NOTE — PLAN OF CARE
Discharge Planner OT   Patient plan for discharge: home, possibly today  Current status: OT eval/tx initiated. Pt lives w/ wife and is typically ind w/ all ADL's/IADL's w/o use of A.D., not driving much lately 2' covid.     Pt alert and oriented to all, very sharp, no cognitive concerns.    Currently, pt ind w/ bed mobility and transfers. SBA for mobility w/in room w/o A.D., did have 1 mild LOB but able to recover w/o A. Pt ind w/ LB dressing and toileting. Spv for high/low item retrieval. Went over e/c techniques and ways to improve safety w/ ADL's and IADL's at home, pt verbalized good understanding. No further IP OT needs identified. Will discharge from IP OT and complete order.  Barriers to return to prior living situation: none  Recommendations for discharge: home w/ spv for bathing and A for IADL's prn such as shopping or heavier cleaning tasks  Rationale for recommendations: Pt is doing well w/ mobility and ADL's and is anticipated to safely return home w/ above A when medically able to discharge.        Entered by: Grace Breaux 05/14/2020 11:43 AM     Occupational Therapy Discharge Summary    Reason for therapy discharge:    All goals and outcomes met, no further needs identified.    Progress towards therapy goal(s). See goals on Care Plan in [facility] electronic health record for goal details.  Goals met    Therapy recommendation(s):    No further therapy is recommended.

## 2020-05-14 NOTE — PLAN OF CARE
A&O x 4. VSS. RA. Tele: SR. Denies pain/CP/SOB. SBA w/FWW. R) internal jugular site ERIN C/D/I. L) chest PleurX drain clamped. Pt has insulin pump, pt dosed. Plan to educate pt on PleurX drain and possible discharge. Will continue w/plan of care.

## 2020-05-14 NOTE — PROGRESS NOTES
"   05/14/20 1108   Quick Adds   Type of Visit Initial Occupational Therapy Evaluation   Living Environment   Lives With spouse   Living Arrangements house   Home Accessibility stairs to enter home;stairs within home   Number of Stairs, Main Entrance 3   Stair Railings, Main Entrance railings safe and in good condition   Number of Stairs, Within Home, Primary   (13)   Stair Railings, Within Home, Primary railings on both sides of stairs   Transportation Anticipated family or friend will provide;car, drives self   Living Environment Comment Pt does have a walker and a tub bench but does not use them. Pt does use a grab bar for the tub shower transfer.   Self-Care   Usual Activity Tolerance excellent   Current Activity Tolerance good   Regular Exercise Yes  (\"As much as I can remember to do it. )   Activity/Exercise/Self-Care Comment Patient normally does the shopping but son in law doing it since COVID 19. Patient does the cooking.    Functional Level   Ambulation 0-->independent   Transferring 0-->independent   Toileting 0-->independent   Bathing 1-->assistive equipment   Dressing 0-->independent   Eating 0-->independent   Communication 0-->understands/communicates without difficulty   Swallowing 0-->swallows foods/liquids without difficulty   Cognition 0 - no cognition issues reported   Fall history within last six months yes  (Initially fall on Halloween when he fell down 4 steps. )   Prior Functional Level Comment Pt ind w/ all ADL's/IADL's w/o A.D. PTA, including driving. Pt does the cooking at home.    General Information   Onset of Illness/Injury or Date of Surgery - Date 05/10/20   Referring Physician Sejal Sylvester MD    Patient/Family Goals Statement return home   Additional Occupational Profile Info/Pertinent History of Current Problem Tc Garcia is a 80 year old male with past medical history of chronic kidney disease, diabetes, hypertension, atrial fibrillation on anticoagulation and a persistent " left pleural effusion since he suffered a fall last October resulting in multiple rib fractures, C3 spinous process fracture , T2 fracture, delirium due to alcohol and hemothorax.  This patient was admitted on 5/10/2020 with symptoms of two days of worsening dyspnea and found to be hypoxic in ED with recurrent large left pleural effusion.   Precautions/Limitations fall precautions   Cognitive Status Examination   Orientation orientation to person, place and time   Level of Consciousness alert   Follows Commands (Cognition) WNL   Memory intact   Visual Perception   Visual Perception Wears glasses   Pain Assessment   Patient Currently in Pain No   Mobility   Bed Mobility Comments ind   Transfer Skill: Sit to Stand   Level of Bertram: Sit/Stand independent   Bathing   Level of Bertram stand-by assist   Upper Body Dressing   Level of Bertram: Dress Upper Body independent   Lower Body Dressing   Level of Bertram: Dress Lower Body stand-by assist   Toileting   Level of Bertram: Toilet stand-by assist   Grooming   Level of Bertram: Grooming stand-by assist   Eating/Self Feeding   Level of Bertram: Eating independent   General Therapy Interventions   Intervention Comments 1 session for e/c techniques and to maximize ind/safety in home environment   Clinical Impression   Criteria for Skilled Therapeutic Interventions Met yes, treatment indicated   OT Diagnosis dec ind/safety w/ ADL's and IADL's   Influenced by the following impairments Decreased dynamic balance, functional act tolerance,    Assessment of Occupational Performance 1-3 Performance Deficits   Identified Performance Deficits decreased ind/safety w/ bathing, g/h,toileting, bathing, IADL's   Clinical Decision Making (Complexity) Low complexity   Therapy Frequency   (eval and 1 treatment)   Predicted Duration of Therapy Intervention (days/wks) 1 day   Anticipated Discharge Disposition Home with Assist   Risks and Benefits of  "Treatment have been explained. Yes   Patient, Family & other staff in agreement with plan of care Yes   [facility] TM \"6 Clicks\"   2016, Trustees of [facility], under license to [employer].  All rights reserved.   6 Clicks Short Forms Daily Activity Inpatient Short Form   [facility]-PAC  \"6 Clicks\" Daily Activity Inpatient Short Form   1. Putting on and taking off regular lower body clothing? 3 - A Little   2. Bathing (including washing, rinsing, drying)? 3 - A Little   3. Toileting, which includes using toilet, bedpan or urinal? 3 - A Little   4. Putting on and taking off regular upper body clothing? 4 - None   5. Taking care of personal grooming such as brushing teeth? 3 - A Little   6. Eating meals? 4 - None   Daily Activity Raw Score (Score out of 24.Lower scores equate to lower levels of function) 20   Total Evaluation Time   Total Evaluation Time (Minutes) 8     "

## 2020-05-14 NOTE — PROGRESS NOTES
"PULMONOLOGY PROGRESS NOTE    Date of Admission: 5/10/2020    CC/Reason for Hospital visit: recurrent unilateral pleural effusion  SUBJECTIVE      Ready to go home, instructions given on how to manage PleurX      ROS: A Problem Pertinent review of systems was negative except for items noted in HPI.  Past Medical, Family, and Social/Substance History has been reviewed: No interval changes.   OBJECTIVE   Vital signs:  Temp: 98  F (36.7  C) Temp src: Oral BP: (!) 142/88   Heart Rate: 69 Resp: 16 SpO2: 97 % O2 Device: None (Room air) Oxygen Delivery: 2 LPM   Weight: 68.4 kg (150 lb 12.8 oz)  Estimated body mass index is 22.27 kg/m  as calculated from the following:    Height as of 4/25/20: 1.753 m (5' 9\").    Weight as of this encounter: 68.4 kg (150 lb 12.8 oz).        I/O last 3 completed shifts:  In: 303 [P.O.:300; I.V.:3]  Out: 320 [Urine:300; Drains:20]      CONSTITUTIONAL/GENERAL APPEARANCE: Alert male. No Apparent Distress.  PSYCHIATRIC: Pleasant and appropriate mood and affect. Oriented x 3.  EARS, NOSE,THROAT,MOUTH: External ears and nose overall normal. Normal oral mucosa.   NECK: Neck appearance normal. No neck masses and the thyroid is not enlarged.   RESPIRATORY: Non-labored effort. TPC on left, draining serosang fluid  CARDIOVASCULAR: irr      LABORATORY ASSESSMENT    Arterial Blood GasNo lab results found in last 7 days.  CBC  Recent Labs   Lab 05/13/20  0553 05/12/20  0541 05/10/20  2207   WBC 9.9 10.7 18.1*   RBC 3.08* 3.13* 3.56*   HGB 8.4* 8.4* 9.5*   HCT 26.1* 26.1* 30.0*   MCV 85 83 84   MCH 27.3 26.8 26.7   MCHC 32.2 32.2 31.7   RDW 18.7* 18.5* 18.9*    322 449     BMP  Recent Labs   Lab 05/13/20  0553 05/12/20  1208 05/12/20  0541 05/10/20  2207     --  142 141   POTASSIUM 3.6 3.0* 2.4* 3.5   CHLORIDE 104  --  103 106   LLOYD 7.7*  --  8.0* 8.9   CO2 29  --  33* 24   BUN 16  --  16 18   CR 1.55*  --  1.59* 1.52*   *  --  94 208*     INR  Recent Labs   Lab 05/10/20  2265   INR " 1.49*      BNPNo lab results found in last 7 days.  VENOUS BLOOD GASES  Recent Labs   Lab 05/13/20  1614 05/13/20  1609 05/13/20  1602   PHV 7.37 7.39 7.38   PCO2V 52* 52* 52*   PO2V 38 38 28   HCO3V 30* 31* 30*         Additional labs and/or comments:     Pleural labs: transudate      IMAGING      CT Chest 5/11  1.  Large left and small right pleural effusions of water density  2.  Airspace abnormalities in the right upper lobe are nonspecific and can be seen with infection or aspiration pneumonia.    PFT & OTHER TESTING       ASSESSMENT / PLAN      Pulmonary diagnoses:  Abnl CT/CXR R91.8  Pleural effusion  Resp fail acute J96.00        ASSESSMENT : 80M with recurrent unilateral pleural effusion. Has had 6 thoras since 12/30/19. Initial labs with transudate. Suspect multifactorial in nature with ckd, poor nutrition, prior thoras with at least partially trapped lung following initial insult. PleurX/TPC placed 5/12. Repeat labs with transudate. Doing well with TPC. Will cap tonight and use vacuum bottles tomorrow. Would suggest draining ~ q3d and recording output and telehealth followup with me in 2 weeks.      Okay to discharge from a pulmonary standpoint.     Dinora Gonzalez M.D.  [facility]  Office: [phone]  Pager: [phone]

## 2020-05-14 NOTE — CONSULTS
Medication coverage check for Eliquis. $47 monthly copay.    Coverage check for Tadalafil. Not covered ($71 out of pocket). MD is changing to generic Revatio (Sildenafil).    Ame Stallworth CphT  [facility]  Discharge Pharmacy Liaison  Liaison Cell: [phone]

## 2020-05-15 LAB — INTERPRETATION ECG - MUSE: NORMAL

## 2020-05-17 LAB
BACTERIA SPEC CULT: NO GROWTH
SPECIMEN SOURCE: NORMAL

## 2020-05-18 ENCOUNTER — TELEPHONE (OUTPATIENT)
Dept: CARDIOLOGY | Facility: CLINIC | Age: 80
End: 2020-05-18

## 2020-05-18 ENCOUNTER — VIRTUAL VISIT (OUTPATIENT)
Dept: CARDIOLOGY | Facility: CLINIC | Age: 80
End: 2020-05-18
Payer: COMMERCIAL

## 2020-05-18 DIAGNOSIS — N18.30 CKD (CHRONIC KIDNEY DISEASE) STAGE 3, GFR 30-59 ML/MIN (H): ICD-10-CM

## 2020-05-18 DIAGNOSIS — Z79.4 TYPE 2 DIABETES MELLITUS WITH STAGE 3 CHRONIC KIDNEY DISEASE, WITH LONG-TERM CURRENT USE OF INSULIN (H): ICD-10-CM

## 2020-05-18 DIAGNOSIS — R54 FRAILTY SYNDROME IN GERIATRIC PATIENT: ICD-10-CM

## 2020-05-18 DIAGNOSIS — I10 UNCONTROLLED HYPERTENSION: ICD-10-CM

## 2020-05-18 DIAGNOSIS — N18.30 TYPE 2 DIABETES MELLITUS WITH STAGE 3 CHRONIC KIDNEY DISEASE, WITH LONG-TERM CURRENT USE OF INSULIN (H): ICD-10-CM

## 2020-05-18 DIAGNOSIS — E11.22 TYPE 2 DIABETES MELLITUS WITH STAGE 3 CHRONIC KIDNEY DISEASE, WITH LONG-TERM CURRENT USE OF INSULIN (H): ICD-10-CM

## 2020-05-18 DIAGNOSIS — D63.8 ANEMIA IN OTHER CHRONIC DISEASES CLASSIFIED ELSEWHERE: ICD-10-CM

## 2020-05-18 DIAGNOSIS — J90 RECURRENT LEFT PLEURAL EFFUSION: ICD-10-CM

## 2020-05-18 DIAGNOSIS — I48.20 CHRONIC ATRIAL FIBRILLATION (H): ICD-10-CM

## 2020-05-18 DIAGNOSIS — I27.20 PULMONARY HYPERTENSION (H): Primary | ICD-10-CM

## 2020-05-18 DIAGNOSIS — I50.30 NYHA CLASS 3 HEART FAILURE WITH PRESERVED EJECTION FRACTION (H): ICD-10-CM

## 2020-05-18 PROCEDURE — 99214 OFFICE O/P EST MOD 30 MIN: CPT | Mod: 95 | Performed by: INTERNAL MEDICINE

## 2020-05-18 RX ORDER — LOSARTAN POTASSIUM 25 MG/1
25 TABLET ORAL EVERY MORNING
Qty: 30 TABLET | Refills: 4 | Status: SHIPPED | OUTPATIENT
Start: 2020-05-18 | End: 2020-05-21

## 2020-05-18 RX ORDER — TORSEMIDE 10 MG/1
10 TABLET ORAL EVERY MORNING
Qty: 30 TABLET | Refills: 4 | Status: ON HOLD | OUTPATIENT
Start: 2020-05-18 | End: 2020-05-29

## 2020-05-18 RX ORDER — POTASSIUM CHLORIDE 1500 MG/1
40 TABLET, EXTENDED RELEASE ORAL ONCE
Status: DISCONTINUED | OUTPATIENT
Start: 2020-05-18 | End: 2020-05-21

## 2020-05-18 NOTE — LETTER
5/18/2020      RE: Tc Garcia  [address] [facility] [id number]       Dear Colleague,    Thank you for the opportunity to participate in the care of your patient, Tc Garcia, at the [facility]. Please see a copy of my visit note below.    REASON FOR VISIT:  Second opinion regarding pulmonary hypertension out of proportion to degree of left heart failure, heart failure with preserved ejection fraction, recurrent left pleural effusion.      HISTORY OF PRESENT ILLNESS:  It was my pleasure to conduct a video visit with Mr. Tc Garcia today.  It was a challenging visit because the patient is hard of hearing and extremely frail and needed multiple reiterations of medication changes, etc.      Briefly, the patient was recently discharged from hospital 4 days ago (5/10 to 05/14/2020) when he was hospitalized with recurrent pleural effusion and heart failure with preserved ejection fraction.  He is an 80-year-old  male who is known to have type 2 diabetes (on insulin therapy), stage III chronic kidney disease, essential hypertension, chronic atrial fibrillation (anticoagulated with apixaban) and frailty.  Six months ago, in 10/2019, he fell and sustained multiple left-sided rib fractures and had a left hemothorax that required chest tube placement.  Since then, he has had recurrent left-sided pleural effusion and has required multiple thoracenteses (about 6-7).  In this context, the apixaban was discontinued.  Malignancy has been ruled out and the fluid has been deemed transudate.      He was hospitalized last week with progressive dyspnea.  He was noted to have another pleural effusion and a left PleurX catheter was placed.  He was given IV furosemide and diuresed.  He was also given empirical antibiotic therapy, although the clinical suspicion for this was low.  He had 2 COVID-19 tests which were both negative.   He also underwent a right heart catheterization on 05/10/2020 (after being diuresed) which showed mild pulmonary hypertension with a baseline mean PA pressure of 28 mmHg (51/15), mean RA of 7 mmHg (Vwave), mean wedge pressure was mildly elevated at 16 mmHg with a PVR of 3.9 Wood units.  His assumed Carlos Manuel cardiac index was preserved at 3.1.  *** he was given inhaled nitric oxide at 40 ppm which normalized his PVR to 1.2 Wood units while the cardiac index remained normal.      Therefore, he was deemed to have pulmonary hypertension out of proportion to his left heart disease and initially given Cialis 5 mg but because this was not a formulated preparation, was switched to sildenafil 20 mg 3 times daily and discharged home.      Other hospital issues include the atrial flutter for which he was given amiodarone but because of prolongation in QTc, this was discontinued.  He is on verapamil 250 mg daily which he takes at night.  His transthoracic echocardiogram showed normal left ventricular size and systolic function with an LVEF of 55%-60% and normal wall motion.  The right ventricle is moderately dilated with mildly decreased systolic function.  There is mild tricuspid valve regurgitation with an estimated RVSP of 47 mmHg plus right atrial pressure, trace mitral valve regurgitation, left-sided pleural effusion and mildly dilated inferior vena cava.      Interestingly, patient was not discharged on any diuretic therapy.  He has been taking clonidine 0.3 mg 2 times daily for almost 3 years for hypertension.  His baseline creatinine is 1.5 with an estimated GFR of 40.  He is prone to mild hypokalemia and when given IV furosemide, it dropped to 2.4 but up to 3.6 on admission.  NT-proBNP on admission was 7000.      Since discharge home, he has been symptomatically stable but his weight has gone up by 12 pounds (152 discharge weight, 164 today).  He reports that he called someone yesterday with a low blood pressure of 88/40  and was told to discontinue the sildenafil completely.  Today, not surprisingly, his blood pressure is very high at 210/64 but he denies any worsening in dyspnea, headaches, chest pain or lower extremity swelling.      PHYSICAL EXAMINATION:   GENERAL:  He has no conversational dyspnea or central cyanosis.     EXTREMITIES:  Difficult to assess lower extremity edema, as patient has hearing impairment.  RESPIRATORY:  There is no wheeze.     VITAL SIGNS:  His weight is up from 152 to 164 pounds.  BP is very high at 210/64.  Patient was unable to assess his pulse.      DIAGNOSES:   1.  Pulmonary hypertension out of proportion to degree of left heart failure.   2.  Heart failure with preserved ejection fraction, LVEF 55%-60%, NYHA class III.   3.  Recurrent left pleural effusion with PleurX in place following traumatic fall and hemothorax in 10/2019.   4.  Stage III chronic kidney disease.   5.  Type 2 diabetes mellitus, on long-term insulin therapy.   6.  Uncontrolled hypertension.   7.  Chronic atrial fibrillation and flutter.  Rate controlled with verapamil 240 mg daily and low-dose Eliquis 2.5 mg 2 times daily.   8.  Chronic anemia.   9.  Elderly frailty.      ASSESSMENT AND PLAN:  This is a complex patient was recently discharged from the hospital 4 days ago and already has gained 12 pounds in fluid weight and is extremely hypertensive in the context of being told to suddenly stop all of his sildenafil.      I am instituting certain steps to avoid a rehospitalization but it is imperative that this patient be seen in person in clinic within the week.   1.  This ongoing management of this patient via video/virtual visit is inappropriate.  He is at high risk of heart failure rehospitalization and needs close in-person monitoring, up-titration of medications for treatment of heart failure, optimization of blood pressure and eventually reinstituting him on a PDE5 inhibitor such as sildenafil.   2.  I have personally  spoken to my nurse, Corin, who will make sure the patient is seen in person for a [facility] visit with TAMAR within this week.   3.  Medication changes today:  Start losartan 25 mg daily, start torsemide 10 mg daily, start potassium chloride 40 mEq daily to guard against hypokalemia.   4.  For now, continue his clonidine 0.3 mg 2 times daily but this is not the best choice for hypertension management in this patient.  Eventually, the goal would be to stop this, up-titrate him to the maximum-tolerated dose of losartan, check his pulse and potentially add low-dose carvedilol, up-titrate his torsemide to maintain him back to his discharge weight of 150-152 pounds, enroll him in the Pulmonary Rehabilitation exercise program, make sure he is not hypoxemic and start him on supplemental oxygen as needed.   5.  Once his blood pressure is optimally controlled and he is at his baseline weight of 152 pounds, sildenafil should be started at 20 mg once daily and gradually up-titrated at 2 or 3-week intervals to b.i.d. and then 3 weeks later to t.i.d.      Total video time was 35 minutes.  An additional 20 minutes of non-video time was spent in liaising care with the Pul[facility] Hypertension Clinic nurse, scheduling staff and prescribing medications.     Please do not hesitate to contact me if you have any questions/concerns.     Sincerely,     Joanna Bonilla MD

## 2020-05-18 NOTE — LETTER
5/18/2020    Senthil Moya MD  [address] [facility] [address]  [facility] [address]    RE: Tc Garcia       Dear Colleague,    I had the pleasure of seeing Tc Garcia in the [facility].    REASON FOR VISIT:  Second opinion regarding pulmonary hypertension out of proportion to degree of left heart failure, heart failure with preserved ejection fraction, recurrent left pleural effusion.      HISTORY OF PRESENT ILLNESS:    It was my pleasure to conduct a video visit with Mr. Tc Garcia today.  It was a challenging visit because the patient is hard of hearing and extremely frail and needed multiple reiterations of medication changes, etc.      Briefly, the patient was recently discharged from hospital 4 days ago (5/10 to 05/14/2020) when he was hospitalized with recurrent pleural effusion and heart failure with preserved ejection fraction.  He is an 80-year-old  male who is known to have type 2 diabetes (on insulin therapy), stage III chronic kidney disease, essential hypertension, chronic atrial fibrillation (anticoagulated with apixaban) and frailty.  Six months ago, in 10/2019, he fell and sustained multiple left-sided rib fractures and had a left hemothorax that required chest tube placement.  Since then, he has had recurrent left-sided pleural effusion and has required multiple thoracenteses (about 6-7).  In this context, the apixaban was discontinued.  Malignancy has been ruled out and the fluid has been deemed transudate.      He was hospitalized last week with progressive dyspnea.  He was noted to have another pleural effusion and a left PleurX catheter was placed.  He was given IV furosemide and diuresed.  He was also given empirical antibiotic therapy, although the clinical suspicion for this was low.  He had 2 COVID-19 tests which were both negative.  He also underwent a right heart catheterization on 05/10/2020 (after being diuresed) which showed mild pulmonary  hypertension with a baseline mean PA pressure of 28 mmHg (51/15), mean RA of 7 mmHg (Vwave), mean wedge pressure was mildly elevated at 16 mmHg with a PVR of 3.9 Wood units.  His assumed Carlos Manuel cardiac index was preserved at 3.1.  he was given inhaled nitric oxide at 40 ppm which normalized his PVR to 1.2 Wood units while the cardiac index remained normal.      Therefore, he was deemed to have pulmonary hypertension out of proportion to his left heart disease and initially given Cialis 5 mg but because this was not a formulated preparation, was switched to sildenafil 20 mg 3 times daily and discharged home.      Other hospital issues include the atrial flutter for which he was given amiodarone but because of prolongation in QTc, this was discontinued.  He is on verapamil 250 mg daily which he takes at night.  His transthoracic echocardiogram showed normal left ventricular size and systolic function with an LVEF of 55%-60% and normal wall motion.  The right ventricle is moderately dilated with mildly decreased systolic function.  There is mild tricuspid valve regurgitation with an estimated RVSP of 47 mmHg plus right atrial pressure, trace mitral valve regurgitation, left-sided pleural effusion and mildly dilated inferior vena cava.      Interestingly, patient was not discharged on any diuretic therapy.  He has been taking clonidine 0.3 mg 2 times daily for almost 3 years for hypertension.  His baseline creatinine is 1.5 with an estimated GFR of 40.  He is prone to mild hypokalemia and when given IV furosemide, it dropped to 2.4 but up to 3.6 on admission.  NT-proBNP on admission was 7000.      Since discharge home, he has been symptomatically stable but his weight has gone up by 12 pounds (152 discharge weight, 164 today).  He reports that he called someone yesterday with a low blood pressure of 88/40 and was told to discontinue the sildenafil completely.  Today, not surprisingly, his blood pressure is very high at  210/64 but he denies any worsening in dyspnea, headaches, chest pain or lower extremity swelling.      PHYSICAL EXAMINATION:   GENERAL:  He has no conversational dyspnea or central cyanosis.     EXTREMITIES:  Difficult to assess lower extremity edema, as patient has hearing impairment.  RESPIRATORY:  There is no wheeze.     VITAL SIGNS:  His weight is up from 152 to 164 pounds.  BP is very high at 210/64.  Patient was unable to assess his pulse.      DIAGNOSES:   1.  Pulmonary hypertension out of proportion to degree of left heart failure.   2.  Heart failure with preserved ejection fraction, LVEF 55%-60%, NYHA class III.   3.  Recurrent left pleural effusion with PleurX in place following traumatic fall and hemothorax in 10/2019.   4.  Stage III chronic kidney disease.   5.  Type 2 diabetes mellitus, on long-term insulin therapy.   6.  Uncontrolled hypertension.   7.  Chronic atrial fibrillation and flutter.  Rate controlled with verapamil 240 mg daily and low-dose Eliquis 2.5 mg 2 times daily.   8.  Chronic anemia.   9.  Elderly frailty.      ASSESSMENT AND PLAN:    This is a complex patient was recently discharged from the hospital 4 days ago and already has gained 12 pounds in fluid weight and is extremely hypertensive in the context of being told to suddenly stop all of his sildenafil.  I am instituting certain steps to avoid a rehospitalization but it is imperative that this patient be seen in person in clinic within the week.     1.  The ongoing management of this patient via video/virtual visit is inappropriate.  He is at high risk of heart failure rehospitalization and needs close in-person monitoring, up-titration of medications for treatment of heart failure, optimization of blood pressure and eventually reinstituting him on a PDE5 inhibitor such as sildenafil.   2.  I have personally spoken to my nurse, Corin, who will make sure the patient is seen in person for a [facility] visit with TAMAR within  this week.   3.  Medication changes today:  Start losartan 25 mg daily, start torsemide 10 mg daily, start potassium chloride 40 mEq daily to guard against hypokalemia.   4.  For now, continue his clonidine 0.3 mg 2 times daily but this is not the best choice for hypertension management in this patient.  Eventually, the goal would be to stop this, up-titrate him to the maximum-tolerated dose of losartan, check his pulse and potentially add low-dose carvedilol, up-titrate his torsemide to maintain him back to his discharge weight of 150-152 pounds, enroll him in the Pulmonary Rehabilitation exercise program, make sure he is not hypoxemic and start him on supplemental oxygen as needed.   5.  Once his blood pressure is optimally controlled and he is at his baseline weight of 152 pounds, sildenafil should be started at 20 mg once daily and gradually up-titrated at 2 or 3-week intervals to b.i.d. and then 3 weeks later to t.i.d.      Total video time was 35 minutes.  An additional 20 minutes of non-video time was spent in liaising care with the Pulmonary Hypertension Clinic nurse, scheduling staff and prescribing medications.       Thank you for allowing me to participate in the care of your patient.    Sincerely,     Joanna Bonilla MD     [facility]

## 2020-05-18 NOTE — TELEPHONE ENCOUNTER
Called to patient post PHONE visit with DR Bonilla today 5/18/2020 at MD request - see her note with assessment and plan.    Patient reports he gave wrong blood pressure at visit this am. His true home recorded blood pressure was 110/64. He took his blood pressure 3 times in an hour this am and reports the blood pressures as 110/64, 94/60, and 120/60; reported Heart Rate 76; reports he lost his pulse oximeter about 3 days ago but has a new one on order. On review patient reports his systolic blood pressure has not been above 120 and the blood pressure reported this am of 210/64 as erroneous and apologized for the error. Patient reports willingness follow Dr Bonilla advise and recommendations.    Updated Dr Bonilla on what patient reports as his true blood pressures.  Plan as outlined in visit instructions with addition of medication change 4. Stop Clonidine and add BMP to Annette BUCIO visit 5/21.     MEDICATION CHANGES:  1.  Start Losartan 25 mg daily.  2.  Start Torsemide 10 mg daily.  3.  Start Potassium chloride 40 mEq daily.  4.  Stop Clonidine.     FOLLOW UP:  1.  See [facility] in person later this week. BMP at visit.  2.  Follow up with Katlin Fontanez in 2 weeks to restart Sildenafil.    Reviewed the AVS (After Visit Summary) and Dr Bonilla adjustments with patient in detail over the phone. The patient was educated on the outlined plan of care including ordered labs, medication changes, and follow up. Wife on speaker taking notes for patient. Patient verbalized understanding of the information and agrees to call with any questions or concerns. Provided number to call during working hours at  [phone] and to on call number after hours and weekends.      Return appointments scheduled with patient and wife:   Future Appointments   Date Time Provider Department Center   5/21/2020  9:00 AM [facility]   5/21/2020  9:30 AM Ame Mejía PA-C [facility]   6/2/2020 12:00 PM [facility]   6/5/2020  3:10 PM Katlin Fontanez PA [facility]     ANISH Delacruz, BSN, RN  05/18/20201:21 PM  RN Care Coordinator  [facility]- [facility]  [phone]

## 2020-05-18 NOTE — PATIENT INSTRUCTIONS
MEDICATION CHANGES:  1.  Start Losartan 25 mg daily.  2.  Start Torsemide 10 mg daily.  3.  Start Potassium chloride 40 mEq daily.  4.  (Addendum) Stop Clonidine.    FOLLOW UP:  1.  See [facility] in person later this week.   (addendum) Add BMP to visit.  2.  Follow up with Katlin Fontanez in 2 weeks to restart Sildenafil.    If you have any questions or concerns, please call my nurse Corin Husain at [phone].

## 2020-05-18 NOTE — PROGRESS NOTES
Service Date: 05/18/2020      VIDEO VISIT      REFERRAL SOURCE:  Dr. Ariel Collins, Cardiology      REASON FOR VISIT:  Second opinion regarding pulmonary hypertension out of proportion to degree of left heart failure, heart failure with preserved ejection fraction, recurrent left pleural effusion.      HISTORY OF PRESENT ILLNESS:    It was my pleasure to conduct a video visit with Mr. Tc Garcia today.  It was a challenging visit because the patient is hard of hearing and extremely frail and needed multiple reiterations of medication changes, etc.      Briefly, the patient was recently discharged from hospital 4 days ago (5/10 to 05/14/2020) when he was hospitalized with recurrent pleural effusion and heart failure with preserved ejection fraction.  He is an 80-year-old  male who is known to have type 2 diabetes (on insulin therapy), stage III chronic kidney disease, essential hypertension, chronic atrial fibrillation (anticoagulated with apixaban) and frailty.  Six months ago, in 10/2019, he fell and sustained multiple left-sided rib fractures and had a left hemothorax that required chest tube placement.  Since then, he has had recurrent left-sided pleural effusion and has required multiple thoracenteses (about 6-7).  In this context, the apixaban was discontinued.  Malignancy has been ruled out and the fluid has been deemed transudate.      He was hospitalized last week with progressive dyspnea.  He was noted to have another pleural effusion and a left PleurX catheter was placed.  He was given IV furosemide and diuresed.  He was also given empirical antibiotic therapy, although the clinical suspicion for this was low.  He had 2 COVID-19 tests which were both negative.  He also underwent a right heart catheterization on 05/10/2020 (after being diuresed) which showed mild pulmonary hypertension with a baseline mean PA pressure of 28 mmHg (51/15), mean RA of 7 mmHg (Vwave), mean wedge pressure was mildly  elevated at 16 mmHg with a PVR of 3.9 Wood units.  His assumed Carlos Manuel cardiac index was preserved at 3.1.  he was given inhaled nitric oxide at 40 ppm which normalized his PVR to 1.2 Wood units while the cardiac index remained normal.      Therefore, he was deemed to have pulmonary hypertension out of proportion to his left heart disease and initially given Cialis 5 mg but because this was not a formulated preparation, was switched to sildenafil 20 mg 3 times daily and discharged home.      Other hospital issues include the atrial flutter for which he was given amiodarone but because of prolongation in QTc, this was discontinued.  He is on verapamil 250 mg daily which he takes at night.  His transthoracic echocardiogram showed normal left ventricular size and systolic function with an LVEF of 55%-60% and normal wall motion.  The right ventricle is moderately dilated with mildly decreased systolic function.  There is mild tricuspid valve regurgitation with an estimated RVSP of 47 mmHg plus right atrial pressure, trace mitral valve regurgitation, left-sided pleural effusion and mildly dilated inferior vena cava.      Interestingly, patient was not discharged on any diuretic therapy.  He has been taking clonidine 0.3 mg 2 times daily for almost 3 years for hypertension.  His baseline creatinine is 1.5 with an estimated GFR of 40.  He is prone to mild hypokalemia and when given IV furosemide, it dropped to 2.4 but up to 3.6 on admission.  NT-proBNP on admission was 7000.      Since discharge home, he has been symptomatically stable but his weight has gone up by 12 pounds (152 discharge weight, 164 today).  He reports that he called someone yesterday with a low blood pressure of 88/40 and was told to discontinue the sildenafil completely.  Today, not surprisingly, his blood pressure is very high at 210/64 but he denies any worsening in dyspnea, headaches, chest pain or lower extremity swelling.      PHYSICAL EXAMINATION:    GENERAL:  He has no conversational dyspnea or central cyanosis.     EXTREMITIES:  Difficult to assess lower extremity edema, as patient has hearing impairment.  RESPIRATORY:  There is no wheeze.     VITAL SIGNS:  His weight is up from 152 to 164 pounds.  BP is very high at 210/64.  Patient was unable to assess his pulse.      DIAGNOSES:   1.  Pulmonary hypertension out of proportion to degree of left heart failure.   2.  Heart failure with preserved ejection fraction, LVEF 55%-60%, NYHA class III.   3.  Recurrent left pleural effusion with PleurX in place following traumatic fall and hemothorax in 10/2019.   4.  Stage III chronic kidney disease.   5.  Type 2 diabetes mellitus, on long-term insulin therapy.   6.  Uncontrolled hypertension.   7.  Chronic atrial fibrillation and flutter.  Rate controlled with verapamil 240 mg daily and low-dose Eliquis 2.5 mg 2 times daily.   8.  Chronic anemia.   9.  Elderly frailty.      ASSESSMENT AND PLAN:    This is a complex patient was recently discharged from the hospital 4 days ago and already has gained 12 pounds in fluid weight and is extremely hypertensive in the context of being told to suddenly stop all of his sildenafil.  I am instituting certain steps to avoid a rehospitalization but it is imperative that this patient be seen in person in clinic within the week.     1.  The ongoing management of this patient via video/virtual visit is inappropriate.  He is at high risk of heart failure rehospitalization and needs close in-person monitoring, up-titration of medications for treatment of heart failure, optimization of blood pressure and eventually reinstituting him on a PDE5 inhibitor such as sildenafil.   2.  I have personally spoken to my nurse, Corin, who will make sure the patient is seen in person for a [facility] visit with TAMAR within this week.   3.  Medication changes today:  Start losartan 25 mg daily, start torsemide 10 mg daily, start potassium chloride 40  mEq daily to guard against hypokalemia.   4.  For now, continue his clonidine 0.3 mg 2 times daily but this is not the best choice for hypertension management in this patient.  Eventually, the goal would be to stop this, up-titrate him to the maximum-tolerated dose of losartan, check his pulse and potentially add low-dose carvedilol, up-titrate his torsemide to maintain him back to his discharge weight of 150-152 pounds, enroll him in the Pulmonary Rehabilitation exercise program, make sure he is not hypoxemic and start him on supplemental oxygen as needed.   5.  Once his blood pressure is optimally controlled and he is at his baseline weight of 152 pounds, sildenafil should be started at 20 mg once daily and gradually up-titrated at 2 or 3-week intervals to b.i.d. and then 3 weeks later to t.i.d.      Total video time was 35 minutes.  An additional 20 minutes of non-video time was spent in liaising care with the Pulmonary Hypertension Clinic nurse, scheduling staff and prescribing medications.         FRANKLIN FULLER MD             D: 2020   T: 2020   MT: DIDIER      Name:     CEDRICK PHILLIPS   MRN:      [id number]        Account:      [id number]   :      1940           Service Date: 2020      Document: [id number]

## 2020-05-18 NOTE — PROGRESS NOTES
"Tc Garcia is a 80 year old male who is being evaluated via a billable video visit.      DICTATION ID [id number]    The patient has been notified of following:     \"This video visit will be conducted via a call between you and your physician/provider. We have found that certain health care needs can be provided without the need for an in-person physical exam.  This service lets us provide the care you need with a video conversation.  If a prescription is necessary we can send it directly to your pharmacy.  If lab work is needed we can place an order for that and you can then stop by our lab to have the test done at a later time.    Video visits are billed at different rates depending on your insurance coverage.  Please reach out to your insurance provider with any questions.    If during the course of the call the physician/provider feels a video visit is not appropriate, you will not be charged for this service.\"    Patient has given verbal consent for Video visit? Yes    How would you like to obtain your AVS? [facility]    Patient would like the video invitation sent by: Text to cell phone: [phone]    Will anyone else be joining your video visit? Wife Radha      Review Of Systems  Skin: NEGATIVE  Eyes:Ears/Nose/Throat: NEGATIVE  Respiratory: Sob on exertion, edema in left leg,   Cardiovascular:NEGATIVE  Gastrointestinal: NEGATIVE  Genitourinary:NEGATIVE   Musculoskeletal: NEGATIVE  Neurologic: NEGATIVE  Psychiatric: NEGATIVE  Hematologic/Lymphatic/Immunologic: NEGATIVE  Endocrine:  Diabetes  Vital signs reported by patient  BP - 110/64 mmHg  P. Unable to assess  Wt. 164 lb  O2. Unable to assess  Patient walks around house for exersice. Follows a low sodium diet.  1-2 cups of tea or coffee per day  Annette Rosenthal LPN    Video-Visit Details    Type of service:  Video Visit    Video Start Time: 11:01 AM  Video End Time: 11:36 AM    Originating Location (pt. Location): Home    Distant Location (provider location):  " [facility]     Platform used for Video Visit: Carlos Alberto Bonilla MD

## 2020-05-18 NOTE — TELEPHONE ENCOUNTER
----- Message from Joanna Bonilla MD sent at 5/18/2020 11:52 AM CDT -----  Regarding: cancel PFT  Hi Corin  Please cancel his upcoming PFT. It is too premature.  Thanks  SJ  ===============    PFT that is scheduled for 6/2/2020 was ordered for 6 months per TAMAR visit 2/2020 for Amiodarone Surveillance.   Per Dr Collins note on 5/14/2020 Amiodarone was discontinued - see note.   Called to patient and cancelled PFT.  Corin Noble RN 05/18/20 2:27 PM

## 2020-05-19 VITALS — SYSTOLIC BLOOD PRESSURE: 110 MMHG | WEIGHT: 164 LBS | DIASTOLIC BLOOD PRESSURE: 64 MMHG | BODY MASS INDEX: 24.22 KG/M2

## 2020-05-20 ENCOUNTER — TELEPHONE (OUTPATIENT)
Dept: CARDIOLOGY | Facility: CLINIC | Age: 80
End: 2020-05-20

## 2020-05-20 NOTE — TELEPHONE ENCOUNTER
Wellness Screening Tool    Symptom Screening:    Do you have one of the following new symptoms:      Fever or reported chills?   No    A new cough (started within the past 14 days)?  No    Shortness of breath (started within the past 14 days)?  No    Nausea, vomiting or diarrhea?  No    Within the past 3 weeks, have you been exposed to someone with a known positive illness below?      COVID - 19 (known or suspected)  no    Chicken pox? no    Measles?  no    Pertussis? No          Patient notified of visitor restriction: Yes  Patient informed to wear a mask: YES    Patient's appointment status: Patient will be seen in clinic as scheduled on 5/21/2020

## 2020-05-21 ENCOUNTER — HOSPITAL ENCOUNTER (INPATIENT)
Facility: CLINIC | Age: 80
LOS: 8 days | Discharge: HOME HEALTH | DRG: 637 | End: 2020-05-29
Attending: EMERGENCY MEDICINE | Admitting: HOSPITALIST
Payer: COMMERCIAL

## 2020-05-21 ENCOUNTER — APPOINTMENT (OUTPATIENT)
Dept: GENERAL RADIOLOGY | Facility: CLINIC | Age: 80
DRG: 637 | End: 2020-05-21
Attending: EMERGENCY MEDICINE
Payer: COMMERCIAL

## 2020-05-21 ENCOUNTER — TELEPHONE (OUTPATIENT)
Dept: CARDIOLOGY | Facility: CLINIC | Age: 80
End: 2020-05-21

## 2020-05-21 ENCOUNTER — CARE COORDINATION (OUTPATIENT)
Dept: CARDIOLOGY | Facility: CLINIC | Age: 80
End: 2020-05-21

## 2020-05-21 ENCOUNTER — VIRTUAL VISIT (OUTPATIENT)
Dept: CARDIOLOGY | Facility: CLINIC | Age: 80
End: 2020-05-21
Payer: COMMERCIAL

## 2020-05-21 VITALS
WEIGHT: 155 LBS | DIASTOLIC BLOOD PRESSURE: 84 MMHG | SYSTOLIC BLOOD PRESSURE: 158 MMHG | HEIGHT: 70 IN | HEART RATE: 70 BPM | BODY MASS INDEX: 22.19 KG/M2

## 2020-05-21 DIAGNOSIS — J90 RECURRENT PLEURAL EFFUSION ON LEFT: ICD-10-CM

## 2020-05-21 DIAGNOSIS — E10.10 DIABETIC KETOACIDOSIS WITHOUT COMA ASSOCIATED WITH TYPE 1 DIABETES MELLITUS (H): Primary | ICD-10-CM

## 2020-05-21 DIAGNOSIS — E87.6 HYPOKALEMIA: ICD-10-CM

## 2020-05-21 DIAGNOSIS — E08.10 DIABETIC KETOACIDOSIS WITHOUT COMA ASSOCIATED WITH DIABETES MELLITUS DUE TO UNDERLYING CONDITION (H): ICD-10-CM

## 2020-05-21 DIAGNOSIS — K59.00 CONSTIPATION, UNSPECIFIED CONSTIPATION TYPE: ICD-10-CM

## 2020-05-21 DIAGNOSIS — I50.33 ACUTE ON CHRONIC HEART FAILURE WITH PRESERVED EJECTION FRACTION (HFPEF) (H): Primary | ICD-10-CM

## 2020-05-21 DIAGNOSIS — R06.02 SHORTNESS OF BREATH: ICD-10-CM

## 2020-05-21 DIAGNOSIS — E10.65 TYPE 1 DIABETES MELLITUS WITH HYPERGLYCEMIA (H): ICD-10-CM

## 2020-05-21 LAB
ALBUMIN SERPL-MCNC: 2.6 G/DL (ref 3.4–5)
ALBUMIN UR-MCNC: 10 MG/DL
ALP SERPL-CCNC: 107 U/L (ref 40–150)
ALT SERPL W P-5'-P-CCNC: 24 U/L (ref 0–70)
ANION GAP SERPL CALCULATED.3IONS-SCNC: 11 MMOL/L (ref 3–14)
ANION GAP SERPL CALCULATED.3IONS-SCNC: 20 MMOL/L (ref 3–14)
ANION GAP SERPL CALCULATED.3IONS-SCNC: 6 MMOL/L (ref 3–14)
APPEARANCE UR: ABNORMAL
AST SERPL W P-5'-P-CCNC: 27 U/L (ref 0–45)
BASE DEFICIT BLDV-SCNC: 7.3 MMOL/L
BASOPHILS # BLD AUTO: 0 10E9/L (ref 0–0.2)
BASOPHILS NFR BLD AUTO: 0.1 %
BILIRUB DIRECT SERPL-MCNC: 0.1 MG/DL (ref 0–0.2)
BILIRUB SERPL-MCNC: 0.6 MG/DL (ref 0.2–1.3)
BILIRUB UR QL STRIP: NEGATIVE
BUN SERPL-MCNC: 21 MG/DL (ref 7–30)
BUN SERPL-MCNC: 23 MG/DL (ref 7–30)
BUN SERPL-MCNC: 24 MG/DL (ref 7–30)
CALCIUM SERPL-MCNC: 8.4 MG/DL (ref 8.5–10.1)
CALCIUM SERPL-MCNC: 8.4 MG/DL (ref 8.5–10.1)
CALCIUM SERPL-MCNC: 8.9 MG/DL (ref 8.5–10.1)
CHLORIDE SERPL-SCNC: 104 MMOL/L (ref 94–109)
CHLORIDE SERPL-SCNC: 106 MMOL/L (ref 94–109)
CHLORIDE SERPL-SCNC: 97 MMOL/L (ref 94–109)
CO2 SERPL-SCNC: 20 MMOL/L (ref 20–32)
CO2 SERPL-SCNC: 24 MMOL/L (ref 20–32)
CO2 SERPL-SCNC: 29 MMOL/L (ref 20–32)
COLOR UR AUTO: YELLOW
CREAT SERPL-MCNC: 1.75 MG/DL (ref 0.66–1.25)
CREAT SERPL-MCNC: 1.76 MG/DL (ref 0.66–1.25)
CREAT SERPL-MCNC: 1.81 MG/DL (ref 0.66–1.25)
DIFFERENTIAL METHOD BLD: ABNORMAL
DIFFERENTIAL METHOD BLD: ABNORMAL
EOSINOPHIL # BLD AUTO: 0 10E9/L (ref 0–0.7)
EOSINOPHIL NFR BLD AUTO: 0 %
ERYTHROCYTE [DISTWIDTH] IN BLOOD BY AUTOMATED COUNT: 18.4 % (ref 10–15)
ERYTHROCYTE [DISTWIDTH] IN BLOOD BY AUTOMATED COUNT: 18.4 % (ref 10–15)
GFR SERPL CREATININE-BSD FRML MDRD: 34 ML/MIN/{1.73_M2}
GFR SERPL CREATININE-BSD FRML MDRD: 35 ML/MIN/{1.73_M2}
GFR SERPL CREATININE-BSD FRML MDRD: 36 ML/MIN/{1.73_M2}
GLUCOSE BLDC GLUCOMTR-MCNC: 133 MG/DL (ref 70–99)
GLUCOSE BLDC GLUCOMTR-MCNC: 172 MG/DL (ref 70–99)
GLUCOSE BLDC GLUCOMTR-MCNC: 185 MG/DL (ref 70–99)
GLUCOSE BLDC GLUCOMTR-MCNC: 267 MG/DL (ref 70–99)
GLUCOSE BLDC GLUCOMTR-MCNC: 286 MG/DL (ref 70–99)
GLUCOSE BLDC GLUCOMTR-MCNC: 412 MG/DL (ref 70–99)
GLUCOSE BLDC GLUCOMTR-MCNC: 429 MG/DL (ref 70–99)
GLUCOSE BLDC GLUCOMTR-MCNC: 452 MG/DL (ref 70–99)
GLUCOSE BLDC GLUCOMTR-MCNC: 76 MG/DL (ref 70–99)
GLUCOSE BLDC GLUCOMTR-MCNC: 81 MG/DL (ref 70–99)
GLUCOSE SERPL-MCNC: 273 MG/DL (ref 70–99)
GLUCOSE SERPL-MCNC: 530 MG/DL (ref 70–99)
GLUCOSE SERPL-MCNC: 71 MG/DL (ref 70–99)
GLUCOSE UR STRIP-MCNC: >1000 MG/DL
HCO3 BLDV-SCNC: 19 MMOL/L (ref 21–28)
HCT VFR BLD AUTO: 27.8 % (ref 40–53)
HCT VFR BLD AUTO: 31.2 % (ref 40–53)
HGB BLD-MCNC: 8.9 G/DL (ref 13.3–17.7)
HGB BLD-MCNC: 9.6 G/DL (ref 13.3–17.7)
HGB UR QL STRIP: ABNORMAL
HYALINE CASTS #/AREA URNS LPF: 1 /LPF (ref 0–2)
IMM GRANULOCYTES # BLD: 0.1 10E9/L (ref 0–0.4)
IMM GRANULOCYTES NFR BLD: 0.5 %
KETONES BLD-SCNC: 0.2 MMOL/L (ref 0–0.6)
KETONES BLD-SCNC: 2.3 MMOL/L (ref 0–0.6)
KETONES UR STRIP-MCNC: 40 MG/DL
LACTATE BLD-SCNC: 2.7 MMOL/L (ref 0.7–2)
LACTATE BLD-SCNC: 4.4 MMOL/L (ref 0.7–2)
LACTATE BLD-SCNC: 4.7 MMOL/L (ref 0.7–2)
LACTATE BLD-SCNC: 5.9 MMOL/L (ref 0.7–2)
LEUKOCYTE ESTERASE UR QL STRIP: NEGATIVE
LYMPHOCYTES # BLD AUTO: 0.4 10E9/L (ref 0.8–5.3)
LYMPHOCYTES # BLD AUTO: 1.6 10E9/L (ref 0.8–5.3)
LYMPHOCYTES NFR BLD AUTO: 12 %
LYMPHOCYTES NFR BLD AUTO: 2.6 %
MAGNESIUM SERPL-MCNC: 2 MG/DL (ref 1.6–2.3)
MCH RBC QN AUTO: 26.9 PG (ref 26.5–33)
MCH RBC QN AUTO: 27.5 PG (ref 26.5–33)
MCHC RBC AUTO-ENTMCNC: 30.8 G/DL (ref 31.5–36.5)
MCHC RBC AUTO-ENTMCNC: 32 G/DL (ref 31.5–36.5)
MCV RBC AUTO: 86 FL (ref 78–100)
MCV RBC AUTO: 87 FL (ref 78–100)
MONOCYTES # BLD AUTO: 0.5 10E9/L (ref 0–1.3)
MONOCYTES # BLD AUTO: 0.6 10E9/L (ref 0–1.3)
MONOCYTES NFR BLD AUTO: 3.9 %
MONOCYTES NFR BLD AUTO: 4 %
NEUTROPHILS # BLD AUTO: 11.5 10E9/L (ref 1.6–8.3)
NEUTROPHILS # BLD AUTO: 14.6 10E9/L (ref 1.6–8.3)
NEUTROPHILS NFR BLD AUTO: 84 %
NEUTROPHILS NFR BLD AUTO: 92.9 %
NITRATE UR QL: NEGATIVE
NRBC # BLD AUTO: 0 10*3/UL
NRBC BLD AUTO-RTO: 0 /100
NT-PROBNP SERPL-MCNC: 5560 PG/ML (ref 0–1800)
O2/TOTAL GAS SETTING VFR VENT: ABNORMAL %
OSMOLALITY SERPL: 307 MMOL/KG (ref 280–301)
OXYHGB MFR BLDV: 22 %
PCO2 BLDV: 39 MM HG (ref 40–50)
PH BLDV: 7.29 PH (ref 7.32–7.43)
PH UR STRIP: 5 PH (ref 5–7)
PHOSPHATE SERPL-MCNC: 2 MG/DL (ref 2.5–4.5)
PHOSPHATE SERPL-MCNC: 2.2 MG/DL (ref 2.5–4.5)
PLATELET # BLD AUTO: 446 10E9/L (ref 150–450)
PLATELET # BLD AUTO: 537 10E9/L (ref 150–450)
PO2 BLDV: 19 MM HG (ref 25–47)
POTASSIUM SERPL-SCNC: 3.1 MMOL/L (ref 3.4–5.3)
POTASSIUM SERPL-SCNC: 3.4 MMOL/L (ref 3.4–5.3)
POTASSIUM SERPL-SCNC: 4.5 MMOL/L (ref 3.4–5.3)
PROCALCITONIN SERPL-MCNC: 0.21 NG/ML
PROT SERPL-MCNC: 5.4 G/DL (ref 6.8–8.8)
RBC # BLD AUTO: 3.24 10E12/L (ref 4.4–5.9)
RBC # BLD AUTO: 3.57 10E12/L (ref 4.4–5.9)
RBC #/AREA URNS AUTO: >182 /HPF (ref 0–2)
SARS-COV-2 PCR COMMENT: NORMAL
SARS-COV-2 RNA SPEC QL NAA+PROBE: NEGATIVE
SARS-COV-2 RNA SPEC QL NAA+PROBE: NORMAL
SODIUM SERPL-SCNC: 137 MMOL/L (ref 133–144)
SODIUM SERPL-SCNC: 139 MMOL/L (ref 133–144)
SODIUM SERPL-SCNC: 141 MMOL/L (ref 133–144)
SOURCE: ABNORMAL
SP GR UR STRIP: 1.02 (ref 1–1.03)
SPECIMEN SOURCE: NORMAL
SPECIMEN SOURCE: NORMAL
SQUAMOUS #/AREA URNS AUTO: <1 /HPF (ref 0–1)
TROPONIN I SERPL-MCNC: 0.03 UG/L (ref 0–0.04)
UROBILINOGEN UR STRIP-MCNC: NORMAL MG/DL (ref 0–2)
WBC # BLD AUTO: 13.6 10E9/L (ref 4–11)
WBC # BLD AUTO: 15.7 10E9/L (ref 4–11)
WBC #/AREA URNS AUTO: 2 /HPF (ref 0–5)

## 2020-05-21 PROCEDURE — 85025 COMPLETE CBC W/AUTO DIFF WBC: CPT | Performed by: HOSPITALIST

## 2020-05-21 PROCEDURE — 80076 HEPATIC FUNCTION PANEL: CPT | Performed by: HOSPITALIST

## 2020-05-21 PROCEDURE — 25800030 ZZH RX IP 258 OP 636: Performed by: EMERGENCY MEDICINE

## 2020-05-21 PROCEDURE — 85025 COMPLETE CBC W/AUTO DIFF WBC: CPT | Performed by: EMERGENCY MEDICINE

## 2020-05-21 PROCEDURE — 99223 1ST HOSP IP/OBS HIGH 75: CPT | Mod: AI | Performed by: HOSPITALIST

## 2020-05-21 PROCEDURE — 80048 BASIC METABOLIC PNL TOTAL CA: CPT | Performed by: HOSPITALIST

## 2020-05-21 PROCEDURE — 81003 URINALYSIS AUTO W/O SCOPE: CPT | Performed by: EMERGENCY MEDICINE

## 2020-05-21 PROCEDURE — 99207 ZZC NO BILLABLE SERVICE THIS VISIT: CPT | Performed by: NURSE PRACTITIONER

## 2020-05-21 PROCEDURE — 96365 THER/PROPH/DIAG IV INF INIT: CPT

## 2020-05-21 PROCEDURE — 83605 ASSAY OF LACTIC ACID: CPT | Performed by: EMERGENCY MEDICINE

## 2020-05-21 PROCEDURE — 99213 OFFICE O/P EST LOW 20 MIN: CPT | Mod: 95 | Performed by: PHYSICIAN ASSISTANT

## 2020-05-21 PROCEDURE — 96360 HYDRATION IV INFUSION INIT: CPT | Mod: 59

## 2020-05-21 PROCEDURE — U0003 INFECTIOUS AGENT DETECTION BY NUCLEIC ACID (DNA OR RNA); SEVERE ACUTE RESPIRATORY SYNDROME CORONAVIRUS 2 (SARS-COV-2) (CORONAVIRUS DISEASE [COVID-19]), AMPLIFIED PROBE TECHNIQUE, MAKING USE OF HIGH THROUGHPUT TECHNOLOGIES AS DESCRIBED BY CMS-2020-01-R: HCPCS | Performed by: EMERGENCY MEDICINE

## 2020-05-21 PROCEDURE — 83605 ASSAY OF LACTIC ACID: CPT | Performed by: HOSPITALIST

## 2020-05-21 PROCEDURE — 80048 BASIC METABOLIC PNL TOTAL CA: CPT | Performed by: EMERGENCY MEDICINE

## 2020-05-21 PROCEDURE — 25000131 ZZH RX MED GY IP 250 OP 636 PS 637: Performed by: EMERGENCY MEDICINE

## 2020-05-21 PROCEDURE — 82010 KETONE BODYS QUAN: CPT | Performed by: HOSPITALIST

## 2020-05-21 PROCEDURE — 25000128 H RX IP 250 OP 636: Performed by: HOSPITALIST

## 2020-05-21 PROCEDURE — 83880 ASSAY OF NATRIURETIC PEPTIDE: CPT | Performed by: EMERGENCY MEDICINE

## 2020-05-21 PROCEDURE — 74018 RADEX ABDOMEN 1 VIEW: CPT

## 2020-05-21 PROCEDURE — 82805 BLOOD GASES W/O2 SATURATION: CPT | Performed by: EMERGENCY MEDICINE

## 2020-05-21 PROCEDURE — 84100 ASSAY OF PHOSPHORUS: CPT | Performed by: HOSPITALIST

## 2020-05-21 PROCEDURE — 25000132 ZZH RX MED GY IP 250 OP 250 PS 637: Performed by: HOSPITALIST

## 2020-05-21 PROCEDURE — 25800030 ZZH RX IP 258 OP 636: Performed by: HOSPITALIST

## 2020-05-21 PROCEDURE — 99285 EMERGENCY DEPT VISIT HI MDM: CPT | Mod: 25

## 2020-05-21 PROCEDURE — 12000047 ZZH R&B IMCU

## 2020-05-21 PROCEDURE — 96366 THER/PROPH/DIAG IV INF ADDON: CPT

## 2020-05-21 PROCEDURE — 81001 URINALYSIS AUTO W/SCOPE: CPT | Performed by: EMERGENCY MEDICINE

## 2020-05-21 PROCEDURE — 84484 ASSAY OF TROPONIN QUANT: CPT | Performed by: EMERGENCY MEDICINE

## 2020-05-21 PROCEDURE — 00000146 ZZHCL STATISTIC GLUCOSE BY METER IP

## 2020-05-21 PROCEDURE — 87040 BLOOD CULTURE FOR BACTERIA: CPT | Performed by: EMERGENCY MEDICINE

## 2020-05-21 PROCEDURE — 71045 X-RAY EXAM CHEST 1 VIEW: CPT

## 2020-05-21 PROCEDURE — 83735 ASSAY OF MAGNESIUM: CPT | Performed by: HOSPITALIST

## 2020-05-21 PROCEDURE — 84145 PROCALCITONIN (PCT): CPT | Performed by: EMERGENCY MEDICINE

## 2020-05-21 PROCEDURE — 87040 BLOOD CULTURE FOR BACTERIA: CPT | Performed by: HOSPITALIST

## 2020-05-21 PROCEDURE — 83930 ASSAY OF BLOOD OSMOLALITY: CPT | Performed by: HOSPITALIST

## 2020-05-21 PROCEDURE — 12000000 ZZH R&B MED SURG/OB

## 2020-05-21 RX ORDER — FUROSEMIDE 20 MG
20 TABLET ORAL DAILY
Status: ON HOLD | COMMUNITY
End: 2020-05-29

## 2020-05-21 RX ORDER — ACETAMINOPHEN 325 MG/1
650 TABLET ORAL EVERY 4 HOURS PRN
Status: DISCONTINUED | OUTPATIENT
Start: 2020-05-21 | End: 2020-05-29 | Stop reason: HOSPADM

## 2020-05-21 RX ORDER — NITROGLYCERIN 0.4 MG/1
0.4 TABLET SUBLINGUAL EVERY 5 MIN PRN
Status: DISCONTINUED | OUTPATIENT
Start: 2020-05-21 | End: 2020-05-29 | Stop reason: HOSPADM

## 2020-05-21 RX ORDER — VERAPAMIL HYDROCHLORIDE 240 MG/1
240 TABLET, FILM COATED, EXTENDED RELEASE ORAL AT BEDTIME
Status: DISCONTINUED | OUTPATIENT
Start: 2020-05-21 | End: 2020-05-29 | Stop reason: HOSPADM

## 2020-05-21 RX ORDER — LOSARTAN POTASSIUM AND HYDROCHLOROTHIAZIDE 25; 100 MG/1; MG/1
1 TABLET ORAL DAILY
Status: ON HOLD | COMMUNITY
End: 2020-05-29

## 2020-05-21 RX ORDER — LIDOCAINE 40 MG/G
CREAM TOPICAL
Status: DISCONTINUED | OUTPATIENT
Start: 2020-05-21 | End: 2020-05-29 | Stop reason: HOSPADM

## 2020-05-21 RX ORDER — SODIUM CHLORIDE 9 MG/ML
INJECTION, SOLUTION INTRAVENOUS ONCE
Status: COMPLETED | OUTPATIENT
Start: 2020-05-21 | End: 2020-05-21

## 2020-05-21 RX ORDER — PRAVASTATIN SODIUM 20 MG
20 TABLET ORAL AT BEDTIME
Status: DISCONTINUED | OUTPATIENT
Start: 2020-05-21 | End: 2020-05-29 | Stop reason: HOSPADM

## 2020-05-21 RX ORDER — POLYETHYLENE GLYCOL 3350 17 G/17G
17 POWDER, FOR SOLUTION ORAL 2 TIMES DAILY
Status: DISCONTINUED | OUTPATIENT
Start: 2020-05-21 | End: 2020-05-29 | Stop reason: HOSPADM

## 2020-05-21 RX ORDER — NICOTINE POLACRILEX 4 MG
15-30 LOZENGE BUCCAL
Status: DISCONTINUED | OUTPATIENT
Start: 2020-05-21 | End: 2020-05-22

## 2020-05-21 RX ORDER — SODIUM CHLORIDE AND POTASSIUM CHLORIDE 150; 450 MG/100ML; MG/100ML
INJECTION, SOLUTION INTRAVENOUS CONTINUOUS
Status: DISCONTINUED | OUTPATIENT
Start: 2020-05-21 | End: 2020-05-23

## 2020-05-21 RX ORDER — VERAPAMIL HYDROCHLORIDE 240 MG/1
240 CAPSULE, EXTENDED RELEASE ORAL AT BEDTIME
Status: DISCONTINUED | OUTPATIENT
Start: 2020-05-21 | End: 2020-05-21 | Stop reason: CLARIF

## 2020-05-21 RX ORDER — DEXTROSE MONOHYDRATE, SODIUM CHLORIDE, AND POTASSIUM CHLORIDE 50; 1.49; 4.5 G/1000ML; G/1000ML; G/1000ML
INJECTION, SOLUTION INTRAVENOUS CONTINUOUS
Status: DISCONTINUED | OUTPATIENT
Start: 2020-05-21 | End: 2020-05-22

## 2020-05-21 RX ORDER — DEXTROSE MONOHYDRATE 25 G/50ML
25-50 INJECTION, SOLUTION INTRAVENOUS
Status: DISCONTINUED | OUTPATIENT
Start: 2020-05-21 | End: 2020-05-22

## 2020-05-21 RX ADMIN — SODIUM CHLORIDE: 9 INJECTION, SOLUTION INTRAVENOUS at 20:39

## 2020-05-21 RX ADMIN — POTASSIUM CHLORIDE, DEXTROSE MONOHYDRATE AND SODIUM CHLORIDE: 150; 5; 450 INJECTION, SOLUTION INTRAVENOUS at 22:12

## 2020-05-21 RX ADMIN — SODIUM CHLORIDE 500 ML: 9 INJECTION, SOLUTION INTRAVENOUS at 14:02

## 2020-05-21 RX ADMIN — SODIUM CHLORIDE 5.5 UNITS/HR: 9 INJECTION, SOLUTION INTRAVENOUS at 14:09

## 2020-05-21 RX ADMIN — APIXABAN 2.5 MG: 2.5 TABLET, FILM COATED ORAL at 20:23

## 2020-05-21 RX ADMIN — UMECLIDINIUM 1 PUFF: 62.5 AEROSOL, POWDER ORAL at 20:18

## 2020-05-21 RX ADMIN — PRAVASTATIN SODIUM 20 MG: 20 TABLET ORAL at 23:11

## 2020-05-21 RX ADMIN — POTASSIUM CHLORIDE AND SODIUM CHLORIDE: 450; 150 INJECTION, SOLUTION INTRAVENOUS at 18:32

## 2020-05-21 RX ADMIN — SODIUM CHLORIDE: 9 INJECTION, SOLUTION INTRAVENOUS at 14:17

## 2020-05-21 RX ADMIN — SODIUM CHLORIDE 250 ML: 9 INJECTION, SOLUTION INTRAVENOUS at 20:18

## 2020-05-21 ASSESSMENT — ACTIVITIES OF DAILY LIVING (ADL)
RETIRED_COMMUNICATION: 0-->UNDERSTANDS/COMMUNICATES WITHOUT DIFFICULTY
ADLS_ACUITY_SCORE: 16
DRESS: 0-->INDEPENDENT
TOILETING: 0-->INDEPENDENT
RETIRED_EATING: 0-->INDEPENDENT
BATHING: 0-->INDEPENDENT
COGNITION: 0 - NO COGNITION ISSUES REPORTED
SWALLOWING: 0-->SWALLOWS FOODS/LIQUIDS WITHOUT DIFFICULTY

## 2020-05-21 ASSESSMENT — MIFFLIN-ST. JEOR: SCORE: 1414.33

## 2020-05-21 ASSESSMENT — ENCOUNTER SYMPTOMS
ABDOMINAL PAIN: 1
VOMITING: 1
CONSTIPATION: 1
NAUSEA: 1
SHORTNESS OF BREATH: 1
FEVER: 0

## 2020-05-21 NOTE — ED TRIAGE NOTES
Pt comes from home for increased SOB and cough. Pt has been getting pleural fluid drained every day since discharge from hospital on Thursday. 800cc drained yesterday. 4mg Zofran given by EMS, - pt reports being out of insulin since this morning.

## 2020-05-21 NOTE — PROGRESS NOTES
"Tc Garcia is a 80 year old male who is being evaluated via a billable telephone visit.      The patient has been notified of following:     \"This telephone visit will be conducted via a call between you and your physician/provider. We have found that certain health care needs can be provided without the need for a physical exam.  This service lets us provide the care you need with a short phone conversation.  If a prescription is necessary we can send it directly to your pharmacy.  If lab work is needed we can place an order for that and you can then stop by our lab to have the test done at a later time.    Telephone visits are billed at different rates depending on your insurance coverage. During this emergency period, for some insurers they may be billed the same as an in-person visit.  Please reach out to your insurance provider with any questions.    If during the course of the call the physician/provider feels a telephone visit is not appropriate, you will not be charged for this service.\"    Patient has given verbal consent for Telephone visit?  Yes    What phone number would you like to be contacted at? [phone]    How would you like to obtain your AVS? Mail a copy    Bp: 158/84  Pulse:70  Wt:155.0lb    Review Of Systems  Skin: Rash  Eyes:Ears/Nose/Throat: NEGATIVE  Respiratory: SOB with walking  Cardiovascular:Edema in left leg  Gastrointestinal: NEGATIVE  Genitourinary:NEGATIVE   Musculoskeletal: pt feels unsteady when walking.  Neurologic: NEGATIVE  Psychiatric: NEGATIVE  Hematologic/Lymphatic/Immunologic: NEGATIVE  Endocrine:  Diabetic    Odette Oshea [facility]  __________________    I have reviewed and updated the patient's Past Medical History, Social History, Family History and Medication List.    ALLERGIES  No known drug allergies    MEDICATIONS  Current Outpatient Medications   Medication Sig Dispense Refill     acetaminophen (TYLENOL) 325 MG tablet Take 2 tablets (650 mg) by mouth every 4 hours " as needed for mild pain  0     apixaban ANTICOAGULANT (ELIQUIS) 5 MG tablet Take 1/2 tablet (2.5mg) by mouth twice daily. You will have your labs checked on 5/4/20 and your PCP will direct you when it is safe to increase your dose back to 1 tablet (5mg) twice daily.       glucagon 1 MG kit 1 mg as needed for low blood sugar       insulin aspart (NovoLOG) inject - to fill ambulatory pump Use as directed  0     INSULIN PUMP - OUTPATIENT Novolog Insulin  BASAL RATES and times:  Midnight to 6am: 0.65 units/hr  6am to 10:30 pm: 0.95 units/hour    10:30pm to midnight: 0.55 units/hr  CARB RATIO: 1 unit per 15 grams  Correction Factor (Sensitivity): 55mg/dL  BLOOD GLUCOSE TARGET and times:  12   AM (midnight): 100 - 140  5:30     AM:  100 - 120  10   PM:  100 - 140  Active Insulin Time:  5 hours   Bolus-Correction 1 unit(s) to lower blood glucose by 55 mg/dL  Checks insulin about 4-5 times a day manually  (Per Rx, pt uses 50-60 units/day)       losartan (COZAAR) 25 MG tablet Take 1 tablet (25 mg) by mouth every morning 30 tablet 4     LOVASTATIN 20 MG PO TABS 1 TABLET DAILY AT DINNER 90 Tab 0     nystatin (MYCOSTATIN) 820944 UNIT/GM external cream Apply topically 2 times daily       tiotropium (SPIRIVA) 18 MCG inhaled capsule Inhale 18 mcg into the lungs daily       torsemide (DEMADEX) 10 MG tablet Take 1 tablet (10 mg) by mouth every morning 30 tablet 4     verapamil ER (VERELAN) 240 MG 24 hr capsule Take 1 capsule (240 mg) by mouth At Bedtime       sildenafil (REVATIO) 20 MG tablet Take 1 tablet (20 mg) by mouth 3 times daily (Patient not taking: Reported on 5/21/2020) 20 tablet 1       Tc Garcia is a pleasant 80 year old patient who presents today for a [facility] clinic enrollment visit.    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 Sedentary - walks around house for exercise. Follows a low sodium diet.  1-2 cups of tea or coffee per day.     Tc is a patient of Dr. Julien and Dr. Camejo who was recently hospitalized from 5/10-5/14/20 with progressive dyspnea.  He was noted to have a recurrent pleural effusion and a left PleurX catheter was placed.  He was given IV furosemide and diuresed.  He was also given empirical antibiotic therapy, although the clinical suspicion for this was low.  He had 2 COVID-19 tests which were both negative. His transthoracic echocardiogram showed normal left ventricular size and systolic function with an LVEF of 55%-60% and normal wall motion.  The right ventricle is moderately dilated with mildly decreased systolic function.  There is mild tricuspid valve regurgitation with an estimated RVSP of 47 mmHg plus right atrial pressure, trace mitral valve regurgitation, left-sided pleural effusion and mildly dilated inferior vena cava.  He also underwent a right heart catheterization on 05/10/2020 (after being diuresed) which showed mild pulmonary hypertension with a baseline mean PA pressure of 28 mmHg (51/15), mean RA of 7 mmHg (Vwave), mean wedge pressure was mildly elevated at 16 mmHg with a PVR of 3.9 Wood units.  His assumed Carlos Manuel cardiac index was preserved at 3.1.  He was given inhaled nitric oxide at 40 ppm which normalized his PVR to 1.2 Wood units while the cardiac index remained normal. Therefore, he was deemed to have pulmonary hypertension out of proportion to his left heart disease and initially given Cialis 5 mg but because this was not a formulated preparation, was switched to sildenafil 20 mg 3 times daily and discharged home. Interestingly, he was not discharged on any  "diuretic therapy. Discharge wt was 152 lbs.  His admission was complicated by atrial flutter for which he was given amiodarone but because of prolongation in QTc, this was discontinued, and he was maintained on his PTA verapamil.      Following discharge he developed symptomatic hypotension and was told to stop it.     He was referred then to see Dr. Bonilla in the [facility] clinic. This took place as a virtual visit on 5/18 (three days ago). He was now markedly hypertensive with an SBP > 200 mmHg, and his weight was up 12 lbs from discharge at 164 lbs. Torsemide 10 mg daily, Losartan 25 mg daily, and KCL 40 mEq were initiated. He was referred to the [facility] clinic for close monitoring in attempt to prevent readmission. I was to see him today in that context.     Today, Tc was scheduled for a clinic visit with me with labs and I also planned to obtain an EKG to reassess the QTc. However he called and attempted to cancel and then was told he could change to a telephone visit. The reason he tried to cancel is because he was having significant nausea and dry heaves this morning, which have been occurring intermittently over the past 2-3 weeks. For this reason he is prioritizing a visit with Dr. Davis today at 11:30a. This visit is very difficult over the phone. He reports his weight is down 10 lbs at 155 lbs. When I ask him if he feels like he lost ten lbs he said \"no, it's been very stable.\" When I reminded him that per his report of home weights, he had gained 12 and then lost 10 lbs since hospital discharge, he said \"oh, that sounds right.\" He thinks his symptoms are overall unchanged. Walking on flat surfaces around the house makes him dyspneic. Denies leg swelling. BP remains elevated but he checked it before taking his AM meds. I had him re-check it now (about 30 minutes post-meds) and he obtained a reading of 139/71 mmHg. He denies lightheadedness, dizziness, near-syncope. No palpitations or chest discomfort. He states " his wife makes sure he's eating low sodium foods.     Assessment/Plan:  In summary, Tc Garcia presents today for a [facility] clinic enrollment visit.     1. HFpEF, PHTN out of proportion to LV dysfunction - Goal weight 150-152 lbs. Reportedly today 155 lbs which is 10 lbs down from his reported weight three days ago, after starting torsemide 10 daily. FC III sxs are unchanged.  2. Hypertension - better-controlled today following med changes two days ago.  3. PAF/PAFL - asymptomatic. Anticoagulated.  4. Prolonged QTc - needs reassessment, now off AMIO.  5. CKD-III - baseline ~1.5.  6. History of diuretic-induced hypokalemia - unclear if he is currently taking potassium supplement prescribed at last visit.  7. Nausea/dry heaves and reduced intake x several weeks - scheduled for assessment later today with PCP.     Plan:  - I very much agree with Dr. Bonilla's opinion that this patient needs to be assessed in person and most definitely requires testing. He is seeing a PCP Dr. Davis at 11:30 today. I will ask that an EKG and labs (BMP, proBNP), weight and BP be performed at that visit and relayed to me. If this is not possible, then I have told Tc he'll need to come in to clinic this afternoon while I am in the infusion clinic. He's okay with this plan.  - Per Dr. Bonilla, we will continue his clonidine 0.3 mg 2 times daily but this is not the best choice for hypertension management in this patient.  Eventually, the goal would be to stop this following BP optimization with maximum-tolerated dose of losartan, and potentially addition of low-dose carvedilol. Once he is optimized from a BP and volume standpoint, sildenafil will be re-initiated.   - Dr. Bonilla has enrolled the patient in the Pulmonary Rehabilitation exercise program.  - I will enroll him in MHT system with initial goal wt of 150-155 lbs, which may need to be adjusted lower.   - Reviewed importance of low sodium diet and routine exercise.   - Serial close follow up  is necessary to keep him out of the hospital.     Follow-up:  - [facility] TAMAR (or Katlin Fontanez who will be following the patient from the  side of things) in clinic in 1 week + repeat labs.   - He is currently scheduled for a telephone visit with Katlin Fontanez on June 5th. However he is not an appropriate candidate for remote visits. This should be changed to an in-clinic visit at [facility] with Katlin.    Phone call duration: 15 minutes    Ame Mejía PA-C  [facility]  Pager: [phone]  Text Page  (7:30am - 4pm M-F)

## 2020-05-21 NOTE — ED NOTES
DATE:  5/21/2020   TIME OF RECEIPT FROM LAB:  4733  LAB TEST:  Glucose  LAB VALUE:  530  RESULTS GIVEN WITH READ-BACK TO (PROVIDER): Sebastian  TIME LAB VALUE REPORTED TO PROVIDER:  7993

## 2020-05-21 NOTE — H&P
[facility]    History and Physical - Hospitalist Service       Date of Admission:  5/21/2020    Assessment & Plan   Tc Garcia is a 80 year old male admitted on 5/21/2020    DKA due to ran out of insulin due to insulin pump  No obvious infection, low risk PCT, UA  Plan  - continue the DKA protocol with serial bmp, phos, and insulin gtt  - follow-up on the blood cultures  - gentle IVF to address lactic acid, and low threshold to stop pending shortness of breath  - follow-up on the culture results, but currently doubt infection  - given the insulin pump, he will need to have his wife bring his insulin in when his gap closes and is appropriate to get off the insulin infusion    COVID PUI  Low suspicion, most likely his shortness of breath is secondary to the chronic pleural effusion  Negative 4/25 and 5/10  Plan  - COVID rule out, low likelihood      Lactic acidosis to 5.9 likely secondary to dehydration  Plan  - serial lactates  - gentle IVF given the       Recurrent pleural effusion status post pleurx catheter insertion  Recent hospitalization for recurrent pleural effusion status post pleurx catheter  Occurred initially after traumatic fall with hemothorax in 2019  CXR shows improvement in his pleural effusion currently  Plan  - remove via pleurx prn, avoid today to accentuate fluid shifts in DKA    Constipation  - start miralax bid    Pulmonary hypertension  HFpEF  - hold losartan/hctz  - prn hydralazine  - continue statin  - hold torsemide/lasix given the DKA  - patient needs clarification of his home regimen, as his cardiologist notes have him taking clonidine but his wife reports he no longer takes.  - if hypertensive overnight would consider restarting clonidine given the above confusion      Chronic atrial fibrillation and flutter  - continue apixiban and verapamil         Diet: npo except ice chips and meds  DVT Prophylaxis: apixaban  Pruett Catheter: not present  Code Status: full  code  Rule Out COVID-19 Handoff:  Tc is a LOW SUSPICION PUI.  Follow these instructions:    If COVID test positive -> continue isolation precautions    If COVID test negative -> discontinue COVID-specific isolation precautions       Disposition Plan   Expected discharge: 2 - 3 days, recommended to prior living arrangement once DKA improves.  Entered: Yahir Wilhelm DO 05/21/2020, 2:55 PM     The patient's care was discussed with the Patient.    Yahir Wilhelm DO  [facility]    ______________________________________________________________________    Chief Complaint   DKA    History is obtained from the patient    History of Present Illness   Tc Garcia is a 80 year old male who was recently discharged from [facility] for the pleurx catheter placement with recurrent pleural effusion with negative cytology who is admitted with nausea and vomiting with DKA. He reported he ran out of insulin in his reservoir of his pump, despite having adequate insulin at home. He started feeling nausea and vomiting so came back in.    In the ED he was started on a DKA protocol.    Review of Systems    The 10 point Review of Systems is negative other than noted in the HPI or here.     Past Medical History    I have reviewed this patient's medical history and updated it with pertinent information if needed.   Past Medical History:   Diagnosis Date     Anemia      CKD (chronic kidney disease) stage 3, GFR 30-59 ml/min (H)      Fall 11/2019    suffered multiple left rib fractures, C3 and T2 fractures     Paroxysmal atrial fibrillation (H)      Personal history of colonic polyps 1972    gets colonoscopy every five years, due in 2006     Pleural effusion on left 11/2019    after multiple rib fractures     Rosacea 1989     Type II or unspecified type diabetes mellitus without mention of complication, not stated as uncontrolled 1999     Unspecified essential hypertension 1994       Past Surgical History   I have  reviewed this patient's surgical history and updated it with pertinent information if needed.  Past Surgical History:   Procedure Laterality Date     ANESTHESIA CARDIOVERSION N/A 2/3/2020    Procedure: ANESTHESIA, FOR CARDIOVERSION;  Surgeon: GENERIC ANESTHESIA PROVIDER;  Location:  OR     CV RIGHT HEART CATH N/A 2020    Procedure: Right Heart Cath;  Surgeon: Senthil Silva MD;  Location:  HEART CARDIAC CATH LAB     HC REMOVE TONSILS/ADENOIDS,<11 Y/O      T & A <11y.o.     IR CHEST TUBE DRAIN TUNNELED LEFT  2020       Social History   I have reviewed this patient's social history and updated it with pertinent information if needed.  Social History     Tobacco Use     Smoking status: Former Smoker     Packs/day: 0.20     Years: 40.00     Pack years: 8.00     Types: Cigarettes     Last attempt to quit: 2008     Years since quittin.3     Smokeless tobacco: Never Used     Tobacco comment: occasional   Substance Use Topics     Alcohol use: Not Currently     Alcohol/week: 35.0 standard drinks     Drug use: Not Currently       Family History   I have reviewed this patient's family history and updated it with pertinent information if needed.   Family History   Problem Relation Age of Onset     Family History Negative Mother          age 71     Family History Negative Father          age 70     Diabetes Brother         alive age 77     Diabetes Brother         alive age 76     Family History Negative Brother              Family History Negative Brother              Diabetes Sister         alive age76     Family History Negative Sister          age 86     Heart Disease Sister              Family History Negative Sister              Family History Negative Sister              Diabetes Sister      Diabetes Sister      Diabetes Brother      Diabetes Brother        Prior to Admission Medications   Prior to Admission Medications    Prescriptions Last Dose Informant Patient Reported? Taking?   INSULIN PUMP - OUTPATIENT  Self Yes Yes   Sig: Novolog Insulin  BASAL RATES and times:  Midnight to 6am: 0.65 units/hr  6am to 10:30 pm: 0.95 units/hour    10:30pm to midnight: 0.55 units/hr  CARB RATIO: 1 unit per 15 grams  Correction Factor (Sensitivity): 55mg/dL  BLOOD GLUCOSE TARGET and times:  12   AM (midnight): 100 - 140  5:30     AM:  100 - 120  10   PM:  100 - 140  Active Insulin Time:  5 hours   Bolus-Correction 1 unit(s) to lower blood glucose by 55 mg/dL  Checks insulin about 15 times a day manually  (Per Rx, pt uses 50-60 units/day)   LOVASTATIN 20 MG PO TABS 2020 at Unknown time Self No Yes   Si TABLET DAILY AT DINNER   acetaminophen (TYLENOL) 325 MG tablet Past Week at Unknown time Self No Yes   Sig: Take 2 tablets (650 mg) by mouth every 4 hours as needed for mild pain   apixaban ANTICOAGULANT (ELIQUIS) 5 MG tablet 2020 at still on 2.5mg Self No Yes   Sig: Take 1/2 tablet (2.5mg) by mouth twice daily. You will have your labs checked on 20 and your PCP will direct you when it is safe to increase your dose back to 1 tablet (5mg) twice daily.   furosemide (LASIX) 20 MG tablet 2020 at Unknown time Self Yes Yes   Sig: Take 20 mg by mouth daily   glucagon 1 MG kit  Self Yes No   Si mg as needed for low blood sugar   insulin aspart (NovoLOG) inject - to fill ambulatory pump  Self No Yes   Sig: Use as directed   losartan-hydrochlorothiazide (HYZAAR) 100-25 MG tablet 2020 at Unknown time Self Yes Yes   Sig: Take 1 tablet by mouth daily   nystatin (MYCOSTATIN) 094154 UNIT/GM external cream  Self Yes Yes   Sig: Apply topically 2 times daily as needed    tiotropium (SPIRIVA) 18 MCG inhaled capsule 2020 at Unknown time Self Yes Yes   Sig: Inhale 18 mcg into the lungs daily   torsemide (DEMADEX) 10 MG tablet  Self No No   Sig: Take 1 tablet (10 mg) by mouth every morning   verapamil ER (VERELAN) 240 MG 24 hr  capsule 5/20/2020 at Unknown time Self No Yes   Sig: Take 1 capsule (240 mg) by mouth At Bedtime      Facility-Administered Medications: None     Allergies   Allergies   Allergen Reactions     No Known Drug Allergies        Physical Exam   Vital Signs: Temp: 98.4  F (36.9  C) Temp src: Temporal BP: 116/71 Pulse: 98 Heart Rate: 98 Resp: 24 SpO2: 95 % O2 Device: None (Room air)    Weight: 0 lbs 0 oz    GENERAL: well developed, pleasant  EYES: pupils reactive, extraocular muscles intact, conjunctivae normal  NECK:  trachea midline, normal range of motion  RESPIRATORY: no tachypnea, breath sounds clear to auscultation, Pleurex catheter to the left anterior chest under a dressing  CVS: normal S1/S2, no murmurs, intact distal pulses  ABDOMEN: soft, nontender, nondistention  MUSCULOSKELETAL: no deformities  SKIN: warm and dry, no acute rashes or ulceration, 2+pitting edema in both lower extremities, irritation to the perianal region without abscess or surrounding erythema  NEURO: GCS 15, cranial nerves intact, alert and oriented x3  PSYCH:  Mood/affect normal    Data   Data reviewed today: I reviewed all medications, new labs and imaging results over the last 24 hours. I personally reviewed the chest x-ray image(s) showing improved left pleural effusion.    Recent Labs   Lab 05/21/20  1302   WBC 15.7*   HGB 9.6*   MCV 87   *      POTASSIUM 4.5   CHLORIDE 97   CO2 20   BUN 21   CR 1.81*   ANIONGAP 20*   LLOYD 8.9   *   TROPI 0.034     Most Recent 3 CBC's:  Recent Labs   Lab Test 05/21/20  1302 05/13/20  0553 05/12/20  0541   WBC 15.7* 9.9 10.7   HGB 9.6* 8.4* 8.4*   MCV 87 85 83   * 295 322     Most Recent 3 BMP's:  Recent Labs   Lab Test 05/21/20  1302 05/13/20  0553 05/12/20  1208 05/12/20  0541    142  --  142   POTASSIUM 4.5 3.6 3.0* 2.4*   CHLORIDE 97 104  --  103   CO2 20 29  --  33*   BUN 21 16  --  16   CR 1.81* 1.55*  --  1.59*   ANIONGAP 20* 9  --  6   LLOYD 8.9 7.7*  --  8.0*   GLC  530* 247*  --  94     Most Recent 2 LFT's:  Recent Labs   Lab Test 05/10/20  2207 04/25/20  1228   AST 28 21   ALT 29 21   ALKPHOS 92 79   BILITOTAL 1.3 0.4     Most Recent 3 INR's:  Recent Labs   Lab Test 05/10/20  2207 04/16/20  0851 02/26/20  0949   INR 1.49* 1.88* 1.28*     Recent Results (from the past 24 hour(s))   XR Chest Port 1 View    Narrative    CHEST ONE VIEW  5/21/2020 1:30 PM     HISTORY: Shortness of breath, recent PleurX catheter placement.    COMPARISON: May 10, 2020      Impression    IMPRESSION: Decreased fluid and associated atelectasis and/or  infiltrate on the left compared to previous. Multiple minimally  displaced rib fractures again evident. Right lung clear. No  pneumothorax.    ALLYSON MARTINEZ MD   XR Abdomen Port 1 View    Narrative    ABDOMEN ONE VIEW PORTABLE  5/21/2020 1:45 PM     HISTORY: Vomiting, 1 view.    COMPARISON: None.       Impression    IMPRESSION: Moderate to large amount of stool. Nonobstructed bowel gas  pattern.    ALLYSON MARTINEZ MD

## 2020-05-21 NOTE — PROGRESS NOTES
[facility]    Spoke with Ame Mejía PA-C, following tele appt with pt this AM. Ame requests the following:     --EKG, BMP, BNP at PCP office (pt has in-person visit at 11:30 today)  --Enroll in MHT with goal wt of 150-155#  --CORE follow-up in clinic in 1 week    Called PCP office, no answer, phone just kept ringing. Faxed EKG, BMP, BNP orders and marked URGENT on cover sheet.     Messaged scheduling to call pt to arrange in clinic visit with Ame on 5/28.    Will watch for lab results and call pt to enroll in MHT tomorrow - reminder to RN board.    Eneida Domingo, BSN, RN, PHN, HNB-BC   5/21/2020 at 10:21 AM

## 2020-05-21 NOTE — LETTER
Transition Communication Hand-off for Care Transitions to Next Level of Care Provider    Name: Tc Garcia     : 1940  [facility] MRN #: [id number]  Primary Care Provider: Senthil Moya    Primary Clinic: [address] [facility] [address] [facility] [address]     Reason for Hospitalization:  Shortness of breath [R06.02]  Type 1 diabetes mellitus with hyperglycemia (H) [E10.65]  Recurrent pleural effusion on left [J90]  Diabetic ketoacidosis without coma associated with type 1 diabetes mellitus (H) [E10.10]  Constipation, unspecified constipation type [K59.00]    Admit Date/Time: 2020 12:39 PM  Discharge Date: 20  Payor Source: Payor: MEDICA / Plan: [employer] / Product Type: HMO /     Readmission Assessment Measure (HIRAM) Risk Score/category: ELEVATED    Plan of Care Goals/Milestone Events:  Recent [facility] hospitalization 5/10 to 2020 with left Pleurx catheter placed for recurrent pleural effusion    Readmitted with DKA due to lack of insulin in his insulin pump.   Plan is to discharge him on basal and premeal insulin. He has used insulin before starting pump 2 years ago. Needs endocrinology follow-up prior to restarting the pump, has difficulty with keeping his BS in check (he turns off the device frequently, forgets when it is on, etc).       Multifactorial shortness of breath (transudative, pulmonary hypertension, and HFpEF)  **COVID negative**  -  IV lasix transitioned to PO per cardiology, holding losartan/hctz  - Note large disconnect between what medications the cardiologist are titrating and what medications the patient is truly taking.  SLUMS was normal; thus home hypoglycemia is leading to intermittent confusion, difficulty with med management, etc.  Needs a home RN at home to help with med management. Will use [facility], referral sent.      Reason for Communication Hand-off Referral:Difficulty understanding plan of care    Concern for non-adherence with plan of  care: Possibly due to intermittent confusion.    Discharge Needs Assessment:  Needs      Most Recent Value   Equipment Currently Used at Home  grab bar, tub/shower   # of Referrals Placed by CTS  Scheduled Follow-up appointments        Follow-up specialty is recommended: Yes endocrine    Follow-up plan:    Future Appointments   Date Time Provider Department Center   6/5/2020  3:10 PM Katlin Fontanez PA [facility]       Any outstanding tests or procedures:        Referrals     Future Labs/Procedures    Home care nursing referral     Comments:    Resume home care RN    RN skilled nursing visit. RN to assess vital signs and weight, patients ability to take and record blood sugar, weight, and to assess for home safety.  RN to teach medication management and PLEASE REVIEW AND MAKE SURE HE IS TAKING MEDICATIONS PER THE DISCHARGE INSTRUCTION (MATCH WITH AVS)    Your provider has ordered home care nursing services. If you have not been contacted within 2 days of your discharge please call the inpatient department phone number at [phone] .    Home Care PT Referral for Hospital Discharge     Comments:    Resume home care PT    PT to eval and treat    Your provider has ordered home care - physical therapy. If you have not been contacted within 2 days of your discharge please call the department phone number listed on the top of this document.           Lore Melendez RN BSN  Inpatient Care Coordinator    AVS/Discharge Summary is the source of truth; this is a helpful guide for improved communication of patient story

## 2020-05-21 NOTE — PHARMACY-ADMISSION MEDICATION HISTORY
Pharmacy Medication History  Admission medication history interview status for the 5/21/2020  admission is complete. See EPIC admission navigator for prior to admission medications     Medication history sources: Patient  Medication history source reliability: Good  Adherence assessment: Good    Significant changes made to the medication list:  Changed Losartan to Losartan with hydrochlorothiazide, Added lasic  5/22 Updated basal rates - pt states his PCP adjusted basal rates a few weeks ago. Will update order.    Medication reconciliation completed by provider prior to medication history? No    Time spent in this activity: 30      Prior to Admission medications    Medication Sig Last Dose Taking? Auth Provider   acetaminophen (TYLENOL) 325 MG tablet Take 2 tablets (650 mg) by mouth every 4 hours as needed for mild pain Past Week at Unknown time Yes Shaun Stack MD   apixaban ANTICOAGULANT (ELIQUIS) 5 MG tablet Take 1/2 tablet (2.5mg) by mouth twice daily. You will have your labs checked on 5/4/20 and your PCP will direct you when it is safe to increase your dose back to 1 tablet (5mg) twice daily. 5/20/2020 at still on 2.5mg Yes Kelley Fernandez,    furosemide (LASIX) 20 MG tablet Take 20 mg by mouth daily 5/20/2020 at Unknown time Yes Unknown, Entered By History   insulin aspart (NovoLOG) inject - to fill ambulatory pump Use as directed  Yes Shaun Stack MD   INSULIN PUMP - OUTPATIENT Novolog Insulin  BASAL RATES and times:  Midnight to 6am: 0.65 units/hr  6am to 10:30 pm: 0.95 units/hour    10:30pm to midnight: 0.55 units/hr  CARB RATIO: 1 unit per 15 grams  Correction Factor (Sensitivity): 55mg/dL  BLOOD GLUCOSE TARGET and times:  12   AM (midnight): 100 - 140  5:30     AM:  100 - 120  10   PM:  100 - 140  Active Insulin Time:  5 hours   Bolus-Correction 1 unit(s) to lower blood glucose by 55 mg/dL  Checks insulin about 15 times a day manually  (Per Rx, pt uses 50-60 units/day)   Yes Unknown, Entered By History   losartan-hydrochlorothiazide (HYZAAR) 100-25 MG tablet Take 1 tablet by mouth daily 5/20/2020 at Unknown time Yes Unknown, Entered By History   LOVASTATIN 20 MG PO TABS 1 TABLET DAILY AT DINNER 5/20/2020 at Unknown time Yes Tc Palacios MD   nystatin (MYCOSTATIN) 029083 UNIT/GM external cream Apply topically 2 times daily as needed   Yes Unknown, Entered By History   tiotropium (SPIRIVA) 18 MCG inhaled capsule Inhale 18 mcg into the lungs daily 5/20/2020 at Unknown time Yes Unknown, Entered By History   verapamil ER (VERELAN) 240 MG 24 hr capsule Take 1 capsule (240 mg) by mouth At Bedtime 5/20/2020 at Unknown time Yes Quinton Camejo MD   glucagon 1 MG kit 1 mg as needed for low blood sugar   Unknown, Entered By History   torsemide (DEMADEX) 10 MG tablet Take 1 tablet (10 mg) by mouth every morning   Joanna Bonilla MD

## 2020-05-21 NOTE — PLAN OF CARE
Pt AOx4, assist of 1, walkerCALVIN. IMC, Insulin drip. Currently Alg. 2. COVID negative, transfer to another unit pending. Tele NSR. Admitted for DKA, has pump that is out of insulin, Lucho freestyle patch in place on R limb, limb is restricted.

## 2020-05-21 NOTE — ED PROVIDER NOTES
History     Chief Complaint:  Shortness of Breath      HPI   Tc Garcia is a 80 year old male, with history of COPD, HTN, CRF stage 3, atrial fibrillation, DM2, and anticoagulated on Apixaban and has a pleurex catheter in place that who presents with dry heaving and shortness of breath. Per chart review, the patient notes that he was recently admitted for pulmonary hypertension and acute respiratory from 5/10 to 5/14 and that he was doing fine for a few days after. Patient notes that he had been seen by his PCP recently for buttocks pain and rash and had been placed on nystatin with no reduction in pain. The patient notes that he has not have a BM for the past 4 days and that he has ran out of insulin last night and his BS was between 160-260. The patient notes that today while sitting at home he noticed that he was short of breath and that he has not been eating. He notes that he has been taking his BP and that since it has been 101/60 yesterday and that it was higher today. He denies chest pain or pressure or fever. The patient notes that he has been having his cath drained every other day with reporting's of Thursday 400 mL, Saturday 500 mL, Monday 550 mL, and Wednesday 750-800 mL drained from cath. Patient notes that he lives at home with his wife and has a nurse come out to drain his catheter and that he has one more visit tomorrow for his wife to learn how to drain.     Allergies:  No known drug allergies     Medications:    Acetaminophen   Eliquis  Glucagon 1 mg  Insulin aspart  Losartan   Lovastatin  Nystatin  Spiriva   Demadex  Verapamil    Past Medical History:    Anemia  ABBIE  HLD  CKD  Fall   Paroxysmal atrial fibrillation   Colonic polyps  DM 2   Hemothorax left  Pleural Effusion  Plantar fascial fibromatosis  Rosacea  Hypertension  Recurrent Left Pleural effusion -- S/P Pleurex Cath  Pulmonary hypertension  Acute respiratory failure with hypoxia  Atrial fibrillation  CRF    Past Surgical History:     Anesthesia cardioversion  CV heart right cath  Tonsillectomy  Adenoidectomy  IR chest tube drain tunneled left, Pluerx cath    Family History:    DM, brother and sister  Heart disease, sister    Social History:  Smoking status: Former smoker  Alcohol use: Not currently   Drug use: Not currently   PCP: Senthil Moya  Presents to the ED unaccompanied  Marital Status:   [2]    Review of Systems   Constitutional: Negative for fever.   Respiratory: Positive for shortness of breath.    Gastrointestinal: Positive for abdominal pain, constipation, nausea (with dry heaving) and vomiting (dry heaving).   All other systems reviewed and are negative.        Physical Exam     Patient Vitals for the past 24 hrs:   BP Temp Temp src Pulse Heart Rate Resp SpO2   05/21/20 1345 (!) 146/75 -- -- 93 94 -- 98 %   05/21/20 1315 -- -- -- -- 95 25 94 %   05/21/20 1300 -- -- -- -- 91 30 92 %   05/21/20 1244 (!) 163/80 98.4  F (36.9  C) Temporal 94 -- 20 99 %        Physical Exam  GENERAL: well developed, pleasant  HEAD: atraumatic  EYES: pupils reactive, extraocular muscles intact, conjunctivae normal  ENT:  mucus membranes moist  NECK:  trachea midline, normal range of motion  RESPIRATORY: no tachypnea, breath sounds clear to auscultation, Pleurex catheter to the left anterior chest under a dressing  CVS: normal S1/S2, no murmurs, intact distal pulses  ABDOMEN: soft, nontender, nondistention  RECTAL: no fecal impaction  MUSCULOSKELETAL: no deformities  SKIN: warm and dry, no acute rashes or ulceration, pitting edema in both lower extremities, irritation to the perianal region without abscess or surrounding erythema  NEURO: GCS 15, cranial nerves intact, alert and oriented x3  PSYCH:  Mood/affect normal      Emergency Department Course     Imaging:  Radiology findings were communicated with the patient who voiced understanding of the findings.    Chest  XR PORT:  Decreased fluid and associated atelectasis and/or   infiltrate on  the left compared to previous. Multiple minimally   displaced rib fractures again evident. Right lung clear. No   pneumothorax.     Reading per radiology     Abdomen XR PORT:  Moderate to large amount of stool. Nonobstructed bowel gas   pattern.     Reading per radiology     Laboratory:  Laboratory findings were communicated with the patient who voiced understanding of the findings.    CBC: WBC 15.7(H), HGB 9.6(L), (H)  BMP: Anion Gap 20 (H), Glucose 530(H), GFR 34(L), Creatinine 1.81(H)  Troponin (Collected 1338): 0.034   BNP: 5,560   Procalcitonin: pending    Blood gas venous: pending  Lactic acid: pending    Procedures    Interventions:  1402 NS 1L IV Bolus    1409 Insulin 1 unit/1 mL in saline, drip  1417 Sodium chloride 1L IV Bolus       Emergency Department Course:   Nursing notes and vitals reviewed.    1300 I performed an exam of the patient as documented above.     1310 IV was inserted and blood was drawn for laboratory testing, results above.     1342 The patient had a Chest and Abdomen PORT XR while in the emergency department, results above.       1405 I personally reviewed the results with the patient and answered all related questions prior to admission.    1410  I spoke to Dr. Wilhelm of the hospitalist service who accepts the patient for admission.        Impression & Plan      Medical Decision Making:  Tc Garcia is a 80 year old male who presents to the emergency department today for evaluation of increasing shortness of breath, nausea and vomiting.  He ran out of insulin.  He has been having his pleurx catheter drained every couple days.  He has a fairly benign abdominal exam.  He has not had a bowel movement in the last 4 days.  X-ray shows constipation.  Chest x-ray shows decreased pleural effusion with possible residual infiltrate.  He has not had his Pleurx drain today yet.  Could be pleural effusion versus infiltrate.  He is afebrile.  White count is slightly elevated but he has  been vomiting.  Procalcitonin is pending.  Electrolytes showed elevated creatinine as well as blood sugar in the 500 range.  VBG is pending.  He does have an anion gap.  COVID will be tested given he is being admitted.  Given the hyperglycemia possible DKA as well as recurrent pleural effusions patient does not look ready to go home.  Patient was placed on insulin drip as were awaiting further tests.  Patient will be admitted to the hospital for ongoing care.    VBG resulted with acidosis, confirming DKA.  Lactic acid is elevated, likely from DKA, although sepsis is considered as well, so blood cultures and urine are added on.  Procal is low, he is afebrile, an alternative diagnosis is made, so antibiotics are held at this time.    Diagnosis:    ICD-10-CM    1. Diabetic ketoacidosis without coma associated with type 1 diabetes mellitus (H)  E10.10 Lactic acid whole blood     Lactic acid whole blood   2. Type 1 diabetes mellitus with hyperglycemia (H)  E10.65 Procalcitonin     Blood gas venous and oxyhgb     Symptomatic COVID-19 Virus (Coronavirus) by PCR     CANCELED: Lactic acid   3. Shortness of breath  R06.02    4. Recurrent pleural effusion on left  J90    5. Constipation, unspecified constipation type  K59.00        Disposition:   The patient is admitted into the care of Dr. Wilhelm.     Scribe Disclosure:  Chanel HALEY, am serving as a scribe at 1300 on 5/21/2020 to document services personally performed by Estrada Sebastian MD based on my observations and the provider's statements to me.   EMERGENCY DEPARTMENT       Estrada Sebastian MD  05/21/20 4403

## 2020-05-21 NOTE — ED NOTES
[facility]  ED Nurse Handoff Report    ED Chief complaint: Shortness of Breath      ED Diagnosis:   Final diagnoses:   Type 1 diabetes mellitus with hyperglycemia (H)   Shortness of breath   Recurrent pleural effusion on left   Constipation, unspecified constipation type   Diabetic ketoacidosis without coma associated with type 1 diabetes mellitus (H)       Code Status: To be addressed by admitting MD    Allergies:   Allergies   Allergen Reactions     No Known Drug Allergies        Patient Story: Patient comes from home for evaluation of feeling increasingly short of breath especially with lying supine. Pt discharged on Thursday from hospital, has had 2 negative covid swabs.     Focused Assessment: A&Ox3, disoriented to time. Denies CP, sinus on tele. Pt reports SOB and chronic cough, 100% on RA. Pt has history of pleural effusion and has had a home nursing come and drain fluid every day since discharge, yesterday 800cc drained.  in ED, pt reports being out of insulin at home. Insulin drip started. Pt reported having nausea this morning, denies N/V in ED. Pt reports independent with ambulation at home. 20 in R wrist and 20 in R FA. Vitally stable, denies pain. Calm, cooperative and resting on cart.     Labs Ordered and Resulted from Time of ED Arrival Up to the Time of Departure from the ED   CBC WITH PLATELETS DIFFERENTIAL - Abnormal; Notable for the following components:       Result Value    WBC 15.7 (*)     RBC Count 3.57 (*)     Hemoglobin 9.6 (*)     Hematocrit 31.2 (*)     MCHC 30.8 (*)     RDW 18.4 (*)     Platelet Count 537 (*)     Absolute Neutrophil 14.6 (*)     Absolute Lymphocytes 0.4 (*)     All other components within normal limits   BASIC METABOLIC PANEL - Abnormal; Notable for the following components:    Anion Gap 20 (*)     Glucose 530 (*)     Creatinine 1.81 (*)     GFR Estimate 34 (*)     GFR Estimate If Black 40 (*)     All other components within normal limits   NT  PROBNP INPATIENT - Abnormal; Notable for the following components:    N-Terminal Pro BNP Inpatient 5,560 (*)     All other components within normal limits   BLOOD GAS VENOUS AND OXYHGB - Abnormal; Notable for the following components:    Ph Venous 7.29 (*)     PCO2 Venous 39 (*)     PO2 Venous 19 (*)     Bicarbonate Venous 19 (*)     All other components within normal limits   LACTIC ACID WHOLE BLOOD - Abnormal; Notable for the following components:    Lactic Acid 5.9 (*)     All other components within normal limits   UA MACROSCOPIC WITH REFLEX TO MICRO AND CULTURE - Abnormal; Notable for the following components:    Glucose Urine >1000 (*)     Ketones Urine 40 (*)     Blood Urine Moderate (*)     Protein Albumin Urine 10 (*)     RBC Urine >182 (*)     All other components within normal limits   GLUCOSE BY METER - Abnormal; Notable for the following components:    Glucose 429 (*)     All other components within normal limits   TROPONIN I   GLUCOSE MONITOR NURSING POCT   PROCALCITONIN   COVID-19 VIRUS (CORONAVIRUS) BY PCR   UA MACROSCOPIC WITH REFLEX TO MICROSCOPIC AND CULTURE   PERIPHERAL IV CATHETER   CARDIAC CONTINUOUS MONITORING   NOTIFY PHYSICIAN   PERIPHERAL IV CATHETER   GLUCOSE BY METER POCT   GLUCOSE BY METER POCT   GLUCOSE BY METER POCT   BLOOD CULTURE   BLOOD CULTURE       Treatments and/or interventions provided: 500ml Bolus NS, Insulin drip initiated, CXR, Abd XR, UA  Patient's response to treatments and/or interventions: resting on cart    To be done/followed up on inpatient unit:  Continue with plan of care per admitting MD.    Does this patient have any cognitive concerns?: Disoriented to time    Activity level - Baseline/Home:  Independent  Activity Level - Current:   Not assessed in ED    Patient's Preferred language: English   Needed?: No    Isolation: Droplet and Contact  Infection: Rule out COVID-19  Bariatric?: No    Vital Signs:   Vitals:    05/21/20 1500 05/21/20 1515 05/21/20 1530  05/21/20 1545   BP: (!) 146/69 (!) 141/67 133/64 133/64   Pulse: 93 97 92 92   Resp: 24 27 28 15   Temp:       TempSrc:       SpO2: 98% 97% 100% 100%       Cardiac Rhythm: Sinus on tele    Was the PSS-3 completed:   Yes  What interventions are required if any?  None             Family Comments: NA  OBS brochure/video discussed/provided to patient/family: N/A              Name of person given brochure if not patient: NA              Relationship to patient: NA    For the majority of the shift this patient's behavior was Green.   Behavioral interventions performed were NA.    ED NURSE PHONE NUMBER: *[phone]

## 2020-05-21 NOTE — ED NOTES
Bed: ED05  Expected date:   Expected time:   Means of arrival:   Comments:  sarah  -531 - 80 M SOB eta 1230

## 2020-05-21 NOTE — ED PROVIDER NOTES
ED course:  Patient received as a handoff from my partner Dr. Sebastian.  On face-to-face evaluation patient reports no additional complaints.  Case discussed with hospitalist who accepted for admission.  Remained stable throughout my care.    Impression:    ICD-10-CM    1. Diabetic ketoacidosis without coma associated with type 1 diabetes mellitus (H)  E10.10 Lactic acid whole blood     Lactic acid whole blood     Blood culture     Blood culture     UA reflex to Microscopic and Culture     UA reflex to Microscopic and Culture   2. Type 1 diabetes mellitus with hyperglycemia (H)  E10.65 Procalcitonin     Blood gas venous and oxyhgb     Symptomatic COVID-19 Virus (Coronavirus) by PCR     CANCELED: Lactic acid   3. Shortness of breath  R06.02    4. Recurrent pleural effusion on left  J90    5. Constipation, unspecified constipation type  K59.00          Disposition:  Admit to [facility] [place]/ Hospitalist         León Gibson DO  05/22/20 0052

## 2020-05-21 NOTE — LETTER
5/21/2020    Senthil Moya MD  [address]  [facility] [address]    RE: Tc Garcia       Dear Colleague,    I had the pleasure of seeing Tc Garcia in the [facility].    Tc Garcia is a pleasant 80 year old patient who presents today for a CORE clinic enrollment visit.    The patient has a history of the following -   # HFpEF, EF 55-60%, with PHTN out of proportion to degree of left heart failure  # Multiple recurrent L pleural effusions following traumatic hemothorax, now s/p PleurX catheter placement 5/2020 (malignancy ruled out)  # PAF/PAFL, on verapamil and (low dose) Eliquis  # CKD-III, baseline creat ~1.5  # Hx of diuretic-induced hypokalemia  # DMII  # HTN  # Chronic anemia  # Elderly frailty  # Social - lives with wife Radha. Sedentary - walks around house for exercise. Follows a low sodium diet.  1-2 cups of tea or coffee per day.     Tc is a patient of Dr. Julien and Dr. Camejo who was recently hospitalized from 5/10-5/14/20 with progressive dyspnea.  He was noted to have a recurrent pleural effusion and a left PleurX catheter was placed.  He was given IV furosemide and diuresed.  He was also given empirical antibiotic therapy, although the clinical suspicion for this was low.  He had 2 COVID-19 tests which were both negative. His transthoracic echocardiogram showed normal left ventricular size and systolic function with an LVEF of 55%-60% and normal wall motion.  The right ventricle is moderately dilated with mildly decreased systolic function.  There is mild tricuspid valve regurgitation with an estimated RVSP of 47 mmHg plus right atrial pressure, trace mitral valve regurgitation, left-sided pleural effusion and mildly dilated inferior vena cava.  He also underwent a right heart catheterization on 05/10/2020 (after being diuresed) which showed mild pulmonary hypertension with a baseline mean PA pressure of 28 mmHg (51/15), mean RA of 7 mmHg (Vwave), mean  "wedge pressure was mildly elevated at 16 mmHg with a PVR of 3.9 Wood units.  His assumed Carlos Manuel cardiac index was preserved at 3.1.  He was given inhaled nitric oxide at 40 ppm which normalized his PVR to 1.2 Wood units while the cardiac index remained normal. Therefore, he was deemed to have pulmonary hypertension out of proportion to his left heart disease and initially given Cialis 5 mg but because this was not a formulated preparation, was switched to sildenafil 20 mg 3 times daily and discharged home. Interestingly, he was not discharged on any diuretic therapy. Discharge wt was 152 lbs.  His admission was complicated by atrial flutter for which he was given amiodarone but because of prolongation in QTc, this was discontinued, and he was maintained on his PTA verapamil.      Following discharge he developed symptomatic hypotension and was told to stop it.     He was referred then to see Dr. Bonilla in the [place] clinic. This took place as a virtual visit on 5/18 (three days ago). He was now markedly hypertensive with an SBP > 200 mmHg, and his weight was up 12 lbs from discharge at 164 lbs. Torsemide 10 mg daily, Losartan 25 mg daily, and KCL 40 mEq were initiated. He was referred to the CORE clinic for close monitoring in attempt to prevent readmission. I was to see him today in that context.     Today, Tc was scheduled for a clinic visit with me with labs and I also planned to obtain an EKG to reassess the QTc. However he called and attempted to cancel and then was told he could change to a telephone visit. The reason he tried to cancel is because he was having significant nausea and dry heaves this morning, which have been occurring intermittently over the past 2-3 weeks. For this reason he is prioritizing a visit with Dr. Davis today at 11:30a. This visit is very difficult over the phone. He reports his weight is down 10 lbs at 155 lbs. When I ask him if he feels like he lost ten lbs he said \"no, it's been very " "stable.\" When I reminded him that per his report of home weights, he had gained 12 and then lost 10 lbs since hospital discharge, he said \"oh, that sounds right.\" He thinks his symptoms are overall unchanged. Walking on flat surfaces around the house makes him dyspneic. Denies leg swelling. BP remains elevated but he checked it before taking his AM meds. I had him re-check it now (about 30 minutes post-meds) and he obtained a reading of 139/71 mmHg. He denies lightheadedness, dizziness, near-syncope. No palpitations or chest discomfort. He states his wife makes sure he's eating low sodium foods.     Assessment/Plan:  In summary, Tc Garcia presents today for a CORE clinic enrollment visit.     1. HFpEF, PHTN out of proportion to LV dysfunction - Goal weight 150-152 lbs. Reportedly today 155 lbs which is 10 lbs down from his reported weight three days ago, after starting torsemide 10 daily. FC III sxs are unchanged.  2. Hypertension - better-controlled today following med changes two days ago.  3. PAF/PAFL - asymptomatic. Anticoagulated.  4. Prolonged QTc - needs reassessment, now off AMIO.  5. CKD-III - baseline ~1.5.  6. History of diuretic-induced hypokalemia - unclear if he is currently taking potassium supplement prescribed at last visit.  7. Nausea/dry heaves and reduced intake x several weeks - scheduled for assessment later today with PCP.     Plan:  - I very much agree with Dr. Bonilla's opinion that this patient needs to be assessed in person and most definitely requires testing. He is seeing a PCP Dr. Davis at 11:30 today. I will ask that an EKG and labs (BMP, proBNP), weight and BP be performed at that visit and relayed to me. If this is not possible, then I have told Tc he'll need to come in to clinic this afternoon while I am in the infusion clinic. He's okay with this plan.  - Per Dr. Bonilla, we will continue his clonidine 0.3 mg 2 times daily but this is not the best choice for hypertension management " in this patient.  Eventually, the goal would be to stop this following BP optimization with maximum-tolerated dose of losartan, and potentially addition of low-dose carvedilol. Once he is optimized from a BP and volume standpoint, sildenafil will be re-initiated.   - Dr. Bonilla has enrolled the patient in the Pulmonary Rehabilitation exercise program.  - I will enroll him in MHT system with initial goal wt of 150-155 lbs, which may need to be adjusted lower.   - Reviewed importance of low sodium diet and routine exercise.   - Serial close follow up is necessary to keep him out of the hospital.     Follow-up:  - [facility] (or Katlin Fontanez who will be following the patient from the  side of things) in clinic in 1 week + repeat labs.   - He is currently scheduled for a telephone visit with Katlin Fontanez on June 5th. However he is not an appropriate candidate for remote visits. This should be changed to an in-clinic visit at [place] with Katlin.    Phone call duration: 15 minutes    Ame Mejía PA-C  [facility]  Pager: [phone]  Text Page  (7:30am - 4pm M-F)       Thank you for allowing me to participate in the care of your patient.    Sincerely,     Ame Mejía PA-C     [facility]

## 2020-05-21 NOTE — TELEPHONE ENCOUNTER
----- Message from Eneida Domingo RN sent at 5/21/2020  8:36 AM CDT -----  Regarding: FW: Ame 9:30am jorge is CXL  Hi Corin GOMEZ. Sounds like maybe he could use a phone assessment.     Eneida  ----- Message -----  From: Anoop Madsen  Sent: 5/21/2020   8:33 AM CDT  To: Radha [facility] Core Nurse  Subject: Ame 9:30am jorge is CXL                     (Pt woke up and has a hard time breathing and does not feel good cannot come in-msg team of CXL and paged Ame of CXL)    Thank you,  Nahomii  ======================    Call to patient to assess - patient reports he is NOT short of breath and is NOT having trouble breathing.  Patient reports he is having dry heaves and nausea as he has had in the past, he does not want to travel but states can talk on the phone for visit. Reviewed with patient that Dr Bonilla wanted him seen this week by CORE TAMAR to keep a close eye on his condition and keep him out of the hospital. Patient reports dry heaves are not new for him and he does not have any shortness or breath, chest pain, or symptoms that require going to the emergency room for evaluation. Patient reports he is aware to go the emergency room as needed if symptoms of shortness of breath, trouble breathing, or chest pain occur. Patient states he is in agreement with Dr Bonilla plan and will keep a PHONE visit with TAMAR. Scheduling informed. TAMAR and CORE nurse updated.

## 2020-05-22 ENCOUNTER — APPOINTMENT (OUTPATIENT)
Dept: OCCUPATIONAL THERAPY | Facility: CLINIC | Age: 80
DRG: 637 | End: 2020-05-22
Attending: INTERNAL MEDICINE
Payer: COMMERCIAL

## 2020-05-22 ENCOUNTER — APPOINTMENT (OUTPATIENT)
Dept: CT IMAGING | Facility: CLINIC | Age: 80
DRG: 637 | End: 2020-05-22
Attending: INTERNAL MEDICINE
Payer: COMMERCIAL

## 2020-05-22 LAB
ANION GAP SERPL CALCULATED.3IONS-SCNC: 10 MMOL/L (ref 3–14)
ANION GAP SERPL CALCULATED.3IONS-SCNC: 11 MMOL/L (ref 3–14)
ANION GAP SERPL CALCULATED.3IONS-SCNC: 4 MMOL/L (ref 3–14)
ANION GAP SERPL CALCULATED.3IONS-SCNC: 5 MMOL/L (ref 3–14)
ANION GAP SERPL CALCULATED.3IONS-SCNC: 6 MMOL/L (ref 3–14)
ANION GAP SERPL CALCULATED.3IONS-SCNC: 6 MMOL/L (ref 3–14)
BUN SERPL-MCNC: 15 MG/DL (ref 7–30)
BUN SERPL-MCNC: 15 MG/DL (ref 7–30)
BUN SERPL-MCNC: 16 MG/DL (ref 7–30)
BUN SERPL-MCNC: 16 MG/DL (ref 7–30)
BUN SERPL-MCNC: 18 MG/DL (ref 7–30)
BUN SERPL-MCNC: 21 MG/DL (ref 7–30)
CALCIUM SERPL-MCNC: 7.9 MG/DL (ref 8.5–10.1)
CALCIUM SERPL-MCNC: 8.1 MG/DL (ref 8.5–10.1)
CALCIUM SERPL-MCNC: 8.2 MG/DL (ref 8.5–10.1)
CALCIUM SERPL-MCNC: 8.2 MG/DL (ref 8.5–10.1)
CALCIUM SERPL-MCNC: 8.3 MG/DL (ref 8.5–10.1)
CALCIUM SERPL-MCNC: 8.3 MG/DL (ref 8.5–10.1)
CHLORIDE SERPL-SCNC: 101 MMOL/L (ref 94–109)
CHLORIDE SERPL-SCNC: 102 MMOL/L (ref 94–109)
CHLORIDE SERPL-SCNC: 103 MMOL/L (ref 94–109)
CHLORIDE SERPL-SCNC: 104 MMOL/L (ref 94–109)
CHLORIDE SERPL-SCNC: 104 MMOL/L (ref 94–109)
CHLORIDE SERPL-SCNC: 105 MMOL/L (ref 94–109)
CO2 SERPL-SCNC: 26 MMOL/L (ref 20–32)
CO2 SERPL-SCNC: 26 MMOL/L (ref 20–32)
CO2 SERPL-SCNC: 28 MMOL/L (ref 20–32)
CO2 SERPL-SCNC: 29 MMOL/L (ref 20–32)
CO2 SERPL-SCNC: 29 MMOL/L (ref 20–32)
CO2 SERPL-SCNC: 30 MMOL/L (ref 20–32)
CREAT SERPL-MCNC: 1.49 MG/DL (ref 0.66–1.25)
CREAT SERPL-MCNC: 1.52 MG/DL (ref 0.66–1.25)
CREAT SERPL-MCNC: 1.54 MG/DL (ref 0.66–1.25)
CREAT SERPL-MCNC: 1.58 MG/DL (ref 0.66–1.25)
CREAT SERPL-MCNC: 1.61 MG/DL (ref 0.66–1.25)
CREAT SERPL-MCNC: 1.66 MG/DL (ref 0.66–1.25)
GFR SERPL CREATININE-BSD FRML MDRD: 38 ML/MIN/{1.73_M2}
GFR SERPL CREATININE-BSD FRML MDRD: 39 ML/MIN/{1.73_M2}
GFR SERPL CREATININE-BSD FRML MDRD: 40 ML/MIN/{1.73_M2}
GFR SERPL CREATININE-BSD FRML MDRD: 42 ML/MIN/{1.73_M2}
GFR SERPL CREATININE-BSD FRML MDRD: 42 ML/MIN/{1.73_M2}
GFR SERPL CREATININE-BSD FRML MDRD: 43 ML/MIN/{1.73_M2}
GLUCOSE BLDC GLUCOMTR-MCNC: 124 MG/DL (ref 70–99)
GLUCOSE BLDC GLUCOMTR-MCNC: 131 MG/DL (ref 70–99)
GLUCOSE BLDC GLUCOMTR-MCNC: 137 MG/DL (ref 70–99)
GLUCOSE BLDC GLUCOMTR-MCNC: 140 MG/DL (ref 70–99)
GLUCOSE BLDC GLUCOMTR-MCNC: 141 MG/DL (ref 70–99)
GLUCOSE BLDC GLUCOMTR-MCNC: 141 MG/DL (ref 70–99)
GLUCOSE BLDC GLUCOMTR-MCNC: 145 MG/DL (ref 70–99)
GLUCOSE BLDC GLUCOMTR-MCNC: 147 MG/DL (ref 70–99)
GLUCOSE BLDC GLUCOMTR-MCNC: 163 MG/DL (ref 70–99)
GLUCOSE BLDC GLUCOMTR-MCNC: 165 MG/DL (ref 70–99)
GLUCOSE BLDC GLUCOMTR-MCNC: 182 MG/DL (ref 70–99)
GLUCOSE BLDC GLUCOMTR-MCNC: 269 MG/DL (ref 70–99)
GLUCOSE BLDC GLUCOMTR-MCNC: 272 MG/DL (ref 70–99)
GLUCOSE BLDC GLUCOMTR-MCNC: 75 MG/DL (ref 70–99)
GLUCOSE SERPL-MCNC: 130 MG/DL (ref 70–99)
GLUCOSE SERPL-MCNC: 150 MG/DL (ref 70–99)
GLUCOSE SERPL-MCNC: 152 MG/DL (ref 70–99)
GLUCOSE SERPL-MCNC: 218 MG/DL (ref 70–99)
GLUCOSE SERPL-MCNC: 349 MG/DL (ref 70–99)
GLUCOSE SERPL-MCNC: 84 MG/DL (ref 70–99)
INTERPRETATION ECG - MUSE: NORMAL
KETONES BLD-SCNC: 0.1 MMOL/L (ref 0–0.6)
KETONES BLD-SCNC: 0.2 MMOL/L (ref 0–0.6)
KETONES BLD-SCNC: 0.3 MMOL/L (ref 0–0.6)
KETONES BLD-SCNC: 2.6 MMOL/L (ref 0–0.6)
LACTATE BLD-SCNC: 1.7 MMOL/L (ref 0.7–2)
PHOSPHATE SERPL-MCNC: 2.4 MG/DL (ref 2.5–4.5)
PHOSPHATE SERPL-MCNC: 2.5 MG/DL (ref 2.5–4.5)
PHOSPHATE SERPL-MCNC: 2.5 MG/DL (ref 2.5–4.5)
PHOSPHATE SERPL-MCNC: 2.9 MG/DL (ref 2.5–4.5)
PHOSPHATE SERPL-MCNC: 2.9 MG/DL (ref 2.5–4.5)
PHOSPHATE SERPL-MCNC: 3.1 MG/DL (ref 2.5–4.5)
POTASSIUM SERPL-SCNC: 3.2 MMOL/L (ref 3.4–5.3)
POTASSIUM SERPL-SCNC: 3.2 MMOL/L (ref 3.4–5.3)
POTASSIUM SERPL-SCNC: 3.4 MMOL/L (ref 3.4–5.3)
POTASSIUM SERPL-SCNC: 3.8 MMOL/L (ref 3.4–5.3)
POTASSIUM SERPL-SCNC: 3.8 MMOL/L (ref 3.4–5.3)
POTASSIUM SERPL-SCNC: 3.9 MMOL/L (ref 3.4–5.3)
SODIUM SERPL-SCNC: 138 MMOL/L (ref 133–144)
SODIUM SERPL-SCNC: 139 MMOL/L (ref 133–144)

## 2020-05-22 PROCEDURE — 99233 SBSQ HOSP IP/OBS HIGH 50: CPT | Performed by: INTERNAL MEDICINE

## 2020-05-22 PROCEDURE — 71250 CT THORAX DX C-: CPT

## 2020-05-22 PROCEDURE — 25800030 ZZH RX IP 258 OP 636: Performed by: EMERGENCY MEDICINE

## 2020-05-22 PROCEDURE — 25000131 ZZH RX MED GY IP 250 OP 636 PS 637: Performed by: EMERGENCY MEDICINE

## 2020-05-22 PROCEDURE — 12000000 ZZH R&B MED SURG/OB

## 2020-05-22 PROCEDURE — 97535 SELF CARE MNGMENT TRAINING: CPT | Mod: GO

## 2020-05-22 PROCEDURE — 00000146 ZZHCL STATISTIC GLUCOSE BY METER IP

## 2020-05-22 PROCEDURE — 97165 OT EVAL LOW COMPLEX 30 MIN: CPT | Mod: GO

## 2020-05-22 PROCEDURE — 25000132 ZZH RX MED GY IP 250 OP 250 PS 637: Performed by: HOSPITALIST

## 2020-05-22 PROCEDURE — 25000132 ZZH RX MED GY IP 250 OP 250 PS 637: Performed by: INTERNAL MEDICINE

## 2020-05-22 PROCEDURE — 83605 ASSAY OF LACTIC ACID: CPT | Performed by: HOSPITALIST

## 2020-05-22 PROCEDURE — 25000128 H RX IP 250 OP 636: Performed by: HOSPITALIST

## 2020-05-22 PROCEDURE — 25800030 ZZH RX IP 258 OP 636: Performed by: HOSPITALIST

## 2020-05-22 PROCEDURE — 25000128 H RX IP 250 OP 636: Performed by: INTERNAL MEDICINE

## 2020-05-22 PROCEDURE — 82010 KETONE BODYS QUAN: CPT | Performed by: HOSPITALIST

## 2020-05-22 PROCEDURE — 84100 ASSAY OF PHOSPHORUS: CPT | Performed by: HOSPITALIST

## 2020-05-22 PROCEDURE — 40000895 ZZH STATISTIC SLP IP EVAL DEFER: Performed by: SPEECH-LANGUAGE PATHOLOGIST

## 2020-05-22 PROCEDURE — 99207 ZZC NO BILLABLE SERVICE THIS VISIT: CPT | Performed by: NURSE PRACTITIONER

## 2020-05-22 PROCEDURE — 25000125 ZZHC RX 250: Performed by: HOSPITALIST

## 2020-05-22 PROCEDURE — 36415 COLL VENOUS BLD VENIPUNCTURE: CPT | Performed by: HOSPITALIST

## 2020-05-22 PROCEDURE — 80048 BASIC METABOLIC PNL TOTAL CA: CPT | Performed by: HOSPITALIST

## 2020-05-22 PROCEDURE — 25000131 ZZH RX MED GY IP 250 OP 636 PS 637: Performed by: INTERNAL MEDICINE

## 2020-05-22 PROCEDURE — 12000047 ZZH R&B IMCU

## 2020-05-22 RX ORDER — DEXTROSE MONOHYDRATE 25 G/50ML
25-50 INJECTION, SOLUTION INTRAVENOUS
Status: DISCONTINUED | OUTPATIENT
Start: 2020-05-22 | End: 2020-05-25

## 2020-05-22 RX ORDER — ONDANSETRON 4 MG/1
4 TABLET, ORALLY DISINTEGRATING ORAL EVERY 6 HOURS PRN
Status: DISCONTINUED | OUTPATIENT
Start: 2020-05-22 | End: 2020-05-29 | Stop reason: HOSPADM

## 2020-05-22 RX ORDER — ONDANSETRON 2 MG/ML
4 INJECTION INTRAMUSCULAR; INTRAVENOUS EVERY 6 HOURS PRN
Status: DISCONTINUED | OUTPATIENT
Start: 2020-05-22 | End: 2020-05-29 | Stop reason: HOSPADM

## 2020-05-22 RX ORDER — NICOTINE POLACRILEX 4 MG
15-30 LOZENGE BUCCAL
Status: DISCONTINUED | OUTPATIENT
Start: 2020-05-22 | End: 2020-05-25

## 2020-05-22 RX ORDER — PIPERACILLIN SODIUM, TAZOBACTAM SODIUM 3; .375 G/15ML; G/15ML
3.38 INJECTION, POWDER, LYOPHILIZED, FOR SOLUTION INTRAVENOUS EVERY 6 HOURS
Status: DISCONTINUED | OUTPATIENT
Start: 2020-05-22 | End: 2020-05-23

## 2020-05-22 RX ORDER — POTASSIUM CHLORIDE 1.5 G/1.58G
40 POWDER, FOR SOLUTION ORAL ONCE
Status: COMPLETED | OUTPATIENT
Start: 2020-05-22 | End: 2020-05-22

## 2020-05-22 RX ADMIN — ONDANSETRON 4 MG: 2 INJECTION INTRAMUSCULAR; INTRAVENOUS at 06:20

## 2020-05-22 RX ADMIN — POTASSIUM CHLORIDE, DEXTROSE MONOHYDRATE AND SODIUM CHLORIDE: 150; 5; 450 INJECTION, SOLUTION INTRAVENOUS at 08:23

## 2020-05-22 RX ADMIN — POTASSIUM CHLORIDE 40 MEQ: 1.5 POWDER, FOR SOLUTION ORAL at 14:58

## 2020-05-22 RX ADMIN — SODIUM PHOSPHATE, MONOBASIC, MONOHYDRATE 15 MMOL: 276; 142 INJECTION, SOLUTION INTRAVENOUS at 00:19

## 2020-05-22 RX ADMIN — PRAVASTATIN SODIUM 20 MG: 20 TABLET ORAL at 22:35

## 2020-05-22 RX ADMIN — UMECLIDINIUM 1 PUFF: 62.5 AEROSOL, POWDER ORAL at 08:09

## 2020-05-22 RX ADMIN — POLYETHYLENE GLYCOL 3350 17 G: 17 POWDER, FOR SOLUTION ORAL at 20:14

## 2020-05-22 RX ADMIN — VERAPAMIL HYDROCHLORIDE 240 MG: 240 TABLET, FILM COATED, EXTENDED RELEASE ORAL at 22:35

## 2020-05-22 RX ADMIN — PIPERACILLIN SODIUM AND TAZOBACTAM SODIUM 3.38 G: 3; .375 INJECTION, POWDER, LYOPHILIZED, FOR SOLUTION INTRAVENOUS at 14:59

## 2020-05-22 RX ADMIN — APIXABAN 2.5 MG: 2.5 TABLET, FILM COATED ORAL at 20:14

## 2020-05-22 RX ADMIN — PIPERACILLIN SODIUM AND TAZOBACTAM SODIUM 3.38 G: 3; .375 INJECTION, POWDER, LYOPHILIZED, FOR SOLUTION INTRAVENOUS at 20:14

## 2020-05-22 RX ADMIN — POTASSIUM CHLORIDE AND SODIUM CHLORIDE: 450; 150 INJECTION, SOLUTION INTRAVENOUS at 11:51

## 2020-05-22 RX ADMIN — APIXABAN 2.5 MG: 2.5 TABLET, FILM COATED ORAL at 08:07

## 2020-05-22 RX ADMIN — POLYETHYLENE GLYCOL 3350 17 G: 17 POWDER, FOR SOLUTION ORAL at 08:07

## 2020-05-22 RX ADMIN — VERAPAMIL HYDROCHLORIDE 240 MG: 240 TABLET, FILM COATED, EXTENDED RELEASE ORAL at 00:15

## 2020-05-22 RX ADMIN — INSULIN ASPART: 100 INJECTION, SOLUTION INTRAVENOUS; SUBCUTANEOUS at 11:30

## 2020-05-22 ASSESSMENT — ACTIVITIES OF DAILY LIVING (ADL)
ADLS_ACUITY_SCORE: 16
ADLS_ACUITY_SCORE: 16
ADLS_ACUITY_SCORE: 15

## 2020-05-22 NOTE — PROVIDER NOTIFICATION
Paged Dr. Cordoba for potassium and phosphorus replacement protocol. Will replace phosphorus but hold on replacing potassium due to elevated creatinine.

## 2020-05-22 NOTE — PROGRESS NOTES
05/22/20 1324   Quick Adds   Type of Visit Initial Occupational Therapy Evaluation   Living Environment   Lives With spouse   Living Arrangements house   Home Accessibility stairs to enter home;stairs within home   Number of Stairs, Main Entrance 3   Stair Railings, Main Entrance railings safe and in good condition   Number of Stairs, Within Home, Primary   (13)   Stair Railings, Within Home, Primary railings safe and in good condition   Transportation Anticipated family or friend will provide   Living Environment Comment Pt does have a walker and a tub bench but does not use them. Pt does use a grab bar for the tub shower transfer.   Self-Care   Usual Activity Tolerance excellent   Current Activity Tolerance good   Regular Exercise Yes   Functional Level   Ambulation 0-->independent   Transferring 0-->independent   Toileting 0-->independent   Bathing 1-->assistive equipment   Dressing 0-->independent   Eating 0-->independent   Communication 0-->understands/communicates without difficulty   Swallowing 0-->swallows foods/liquids without difficulty   Cognition 0 - no cognition issues reported   Fall history within last six months no   Which of the above functional risks had a recent onset or change? toileting;transferring;ambulation;bathing;dressing   Prior Functional Level Comment Pt ind w/ all ADL's/IADL's w/o A.D. PTA, including driving. Pt does the cooking at home.    General Information   Onset of Illness/Injury or Date of Surgery - Date 05/21/20   Referring Physician Radha Fu MD    Patient/Family Goals Statement return home   Additional Occupational Profile Info/Pertinent History of Current Problem Admitted for DKA, has pump that is out of insulin,   Precautions/Limitations fall precautions   Cognitive Status Examination   Orientation orientation to person, place and time   Level of Consciousness alert   Follows Commands (Cognition) WNL   Visual Perception   Visual Perception Wears glasses   Pain  Assessment   Patient Currently in Pain No   Range of Motion (ROM)   ROM Comment BUE AROM WNL   Strength   Strength Comments BUE strength WFL, symmetrical   Hand Strength   Hand Strength Comments WFL   Coordination   Coordination Comments impaired 2' significant tremors   Mobility   Bed Mobility Comments SBA and VCs   Transfer Skill: Sit to Stand   Level of [place]: Sit/Stand contact guard   Bathing   Level of [place] moderate assist (50% patients effort)   Upper Body Dressing   Level of [place]: Dress Upper Body stand-by assist   Lower Body Dressing   Level of [place]: Dress Lower Body moderate assist (50% patients effort)   Toileting   Level of [place]: Toilet minimum assist (75% patients effort)   Grooming   Level of [place]: Grooming contact guard   Eating/Self Feeding   Level of [place]: Eating independent   Activities of Daily Living Analysis   Impairments Contributing to Impaired Activities of Daily Living balance impaired;coordination impaired;strength decreased   General Therapy Interventions   Planned Therapy Interventions ADL retraining;IADL retraining;progressive activity/exercise   Clinical Impression   Criteria for Skilled Therapeutic Interventions Met yes, treatment indicated   OT Diagnosis dec ind/safety w/ ADL's/IADL's   Influenced by the following impairments decreased strength, balance, functional act tolerance, coordination   Assessment of Occupational Performance 1-3 Performance Deficits   Identified Performance Deficits decreased ind/safety w/ dressing, bathing, toileting, g/h, functional mobility, IADL's   Clinical Decision Making (Complexity) Low complexity   Therapy Frequency Daily   Predicted Duration of Therapy Intervention (days/wks) 6 days   Anticipated Discharge Disposition Transitional Care Facility   Risks and Benefits of Treatment have been explained. Yes   Patient, Family & other staff in agreement with plan of care Yes   [facility] AM-PAC   "\"6 Clicks\"   2016, Trustees of [facility], under license to [employer].  All rights reserved.   6 Clicks Short Forms Daily Activity Inpatient Short Form   [facility] AM-PAC  \"6 Clicks\" Daily Activity Inpatient Short Form   1. Putting on and taking off regular lower body clothing? 2 - A Lot   2. Bathing (including washing, rinsing, drying)? 2 - A Lot   3. Toileting, which includes using toilet, bedpan or urinal? 3 - A Little   4. Putting on and taking off regular upper body clothing? 4 - None   5. Taking care of personal grooming such as brushing teeth? 3 - A Little   6. Eating meals? 4 - None   Daily Activity Raw Score (Score out of 24.Lower scores equate to lower levels of function) 18   Total Evaluation Time   Total Evaluation Time (Minutes) 8     "

## 2020-05-22 NOTE — PROGRESS NOTES
[facility]  Patient is currently open to home care services with [facility]. The patient is currently receiving Skilled Nursing and OT services. Was also scheduled to receive home PT eval, but this visit is on hold due to this hospitalization. [facility]  and team have been notified of patient admission. [facility] liaison will continue to follow patient during stay. If appropriate provide orders to resume home care at time of discharge.

## 2020-05-22 NOTE — PLAN OF CARE
VSS, denies pain, up SBA, patient using own insulin pump, tolerating mod cho diet, tele A-Fib CVR, K 3.2, replaced per protocol. Voiding, Left PleurX tube drained 700 ml sanguinous fluid returned. Up ambulating to bathroom, patient states no improvement of SOB with exertion.

## 2020-05-22 NOTE — PROGRESS NOTES
[facility]    Reviewed chart to check on lab results and enroll pt in [facility]. Unfortunately, pt presented to ED yesterday with sx of SOB (per H&P, likely r/t chronic pleural effusion). He has been admitted for DKA r/t running out of insulin. Unclear disposition.     Future Appointments   Date Time Provider Department Center   5/22/2020 10:00 AM Deepti Logan, SLP [facility]   5/28/2020 12:30 PM Ame Mejía PA-C [facility]   6/5/2020  3:10 PM Katlin Fotnanez PA [facility]       Pt has follow-up already scheduled on 5/28/20 with Ame Mejía PA-C. Will review chart on Tuesday (Monday holiday) and adjust follow-up plan as needed - reminder to RN board.     FYI to PH RN.     BAYRON VanN, RN, PHN, HNB-BC   5/22/2020 at 9:20 AM

## 2020-05-22 NOTE — PROVIDER NOTIFICATION
1330: Insulin gtt stopped @ 1145, transitioned to patients own insulin pump, BG 1 hour recheck 181, patient eating meal. Covered with carb count coverage. K 3.2 this AM Provider notified. New orders received.

## 2020-05-22 NOTE — PROGRESS NOTES
Sepsis Evaluation Progress Note    I was called to see Tc Garcia due to elevated scheduled lactic acid. He is not known to have an infection.     Physical Exam   Vital Signs:  Temp: 98.4  F (36.9  C) Temp src: Temporal BP: 131/62 Pulse: 83 Heart Rate: 78 Resp: 14 SpO2: 99 % O2 Device: None (Room air)      Lab:  Lactic Acid   Date Value Ref Range Status   05/21/2020 4.4 (HH) 0.7 - 2.0 mmol/L Final     Comment:     Critical Value called to and read back by  SEA KC IN OBS 1956 CK       Lactate for Sepsis Protocol   Date Value Ref Range Status   05/11/2020 1.3 0.7 - 2.0 mmol/L Final       The patient is at baseline mental status.     The rest of their physical exam is significant for:    Gen: Pt sitting up in bed, awake, alert, in no apparent distress, watching TV  Neuro: Awake, alert, clear speech  Resp: In no apparent respiratory distress, clear to auscultation bilaterally, no crackles or wheezes noted  CV: Regular rate and rhythm, normal S1S2, harsh systolic murmur 4-5/6 noted, no rub or gallop noted  GI: Soft, nondistended, nontender to palpation, no guarding or rebound tenderness noted, active bowel sounds  Skin: Warm, dry  Ext: +2-3 pitting BLE edema noted     Assessment & Plan   NO EVIDENCE OF SEPSIS at this time.  Vital sign, physical exam, and lab findings are likely due to DKA, hypovolemia in setting of recent nausea, poor PO intake.     Of note, admitting hospitalist has ordered for 250 ml IVF bolus to be administered now.  Requested for nursing to stop IVF immediately if pt's SOB worsens.  Pt reports 2 day history of worsening SOB.  Pt notes worsening BLE edema 2 weeks ago.  Noted COVID-19 negative 5/21/20.    Addendum: 0100: Nursing staff provided lactic acid result of 2.7 from 2240 while present on unit.  Continues to trend down at this time.  Anion gap has closed, ketones negative.      Disposition: The patient will remain on the current unit. We will continue to monitor this patient closely.      Edil Montero, APRN [facility]   House TAMAR    Sepsis Criteria   Sepsis: 2+ SIRS criteria due to infection  Severe Sepsis: Sepsis AND 1+ new sign of acute organ dysfunction (Note: lactate >2 is organ dysfunction)  Septic Shock: Sepsis AND hypotension despite volume resuscitation with 30 ml/kg crystalloid

## 2020-05-22 NOTE — PLAN OF CARE
SLP: ST orders received.Chart reviewed. SLP services in 2019 during hospitalization with recommendation of softer solids and GERD precautions. Pt denied dysphagia symptoms and reported good appetite until 2 days ago. Pt currently NPO for medical reasons. Discussed with pt and RN.     Would start diet when okay with MD, complete nursing dysphagia screen. If any concerns for dysphagia, SLP will check back and complete full evaluation if indicated. SLP pager: [phone]

## 2020-05-22 NOTE — PLAN OF CARE
A&Ox4. VSS on RA. Tele: afib CVR. Insulin gtt infusing on Alg 1, Q2hr BG checks. Phos replaced. Lactic acid trending down. LS diminished. Left pleurx cath CDI. BLE edema +2 with baseline N/T. Mepilex on coccyx for pre existing redness. Pt own insulin pump at bedside. Some nausea this AM, zofran. NPO ex ice/meds. Up A1 GB and walker.

## 2020-05-22 NOTE — PLAN OF CARE
Discharge Planner OT   Patient plan for discharge: home w/ wife  Current status: OT eval/tx initiated today. Pt lives w/ wife and is typically ind w/ ADL's/IADL's and mobility, son has been doing the shopping lately 2' covid.    Currently, pt SBA and VCs for bed mobility. CGA and VC's for transfers. CGA for taking ~ 8 steps forward using 2WW, min A to stabilize walker 2' tremors when taking backwards steps. Pt unable to don/doff socks 2' fatigue and increased tremors.   Pt alert and oriented. Pt hypertensive w/ VSS. Pt much more unsteady than when seen last OT session while admitted 5/14.  Barriers to return to prior living situation: decreased strength, functional act tolerance, balance, fall risk  Recommendations for discharge: TCU  Rationale for recommendations: Currently, pt below functional baseline and will benefit from continued daily therapy at TCU to improve ind/safety w/ ADL's, mobility, and IADL's.     Will continue to assess as pt progresses. If pt returns home would require A for all mobility and ADL's/IADL's and HH therapy to maximize safety and reduce fall risk.       Entered by: Grace Breaux 05/22/2020 1:44 PM

## 2020-05-22 NOTE — PROGRESS NOTES
[facility]    Hospitalist Progress Note    Assessment & Plan   Tc Garcia is a 80 year old male who was admitted on 5/21/2020.  Patient was recently discharged from [facility] following a stay from 5/10 to 5/14.  He is currently admitted for following DKA.  He had a left Pleurx catheter placed during his prior admission for recurrent pleural effusion  DKA due to lack of insulin his insulin pump  --his DKA has resolved, lactic acid has  normalized plan to restart his a insulin pump, his insulin pump orders reviewed and restarted, will stop the insulin drip once the pump starts working started on moderate carb diet, he has carb coverage with meals on the pump as well.  ---His increased shortness of breath possibly secondary to fluid accumulation, will drain it again today.  ---He could possibly have a pneumonia as per chest x-ray done on admission, he has elevated white count possibly secondary to DKA versus underlying pneumonia ,will start on antibiotics for hospital  acquired pneumonia , can deescalate once CT negative .  ---will order CT chest for better clarification of his pneumonia .  ---COVID negative he was also negative on 4/25 and 5/10  Pulmonary hypertension  HFpEF  - hold losartan/hctz  - prn hydralazine  - continue statin, restarted on torsemide  Chronic atrial fibrillation and flutter  - continue apixiban and verapamil  Hypokalemia  --We will give 40 mEq of potassium  CKD stage III  --Creatinine seems to be baseline  DVT Prophylaxis: Patient is on Eliquis  Code Status: Prior     Disposition: Expected discharge possibly tomorrow after evaluation by PT and OT    Radha uF MD  Text Page   (7am to 6pm)    Interval History   Patient complains about increased shortness of breath, his anion gap is closed and his renal function has improved, he is being transitioned to his home insulin pump settings.  Patient complains about worsening shortness of breath, on review of his records  he usually get his Pleurx catheter drained every other day, discussed that with nursing, will try to drain his Pleurx catheter today.  His lactic acid levels have improved. he lives at home with home care complains about deconditioning and shortness of breath which is worsening, will  Ask PT/OT to assess him      -Data reviewed today: I reviewed all new labs and imaging results over the last 24 hours.  Physical Exam   Temp: 97.7  F (36.5  C) Temp src: Oral BP: 109/78 Pulse: 69 Heart Rate: 68 Resp: 20 SpO2: 94 % O2 Device: None (Room air)    There were no vitals filed for this visit.  Vital Signs with Ranges  Temp:  [97.7  F (36.5  C)-98.4  F (36.9  C)] 97.7  F (36.5  C)  Pulse:  [58-98] 69  Heart Rate:  [61-98] 68  Resp:  [14-30] 20  BP: (109-163)/(61-80) 109/78  SpO2:  [92 %-100 %] 94 %  I/O last 3 completed shifts:  In: 2007 [P.O.:180; I.V.:1327; IV Piggyback:500]  Out: 250 [Urine:250]    Constitutional: Awake, alert, cooperative, no apparent distress, mild respiratory distress noted, he is very hard of hearing  Respiratory: Bilateral basilar crackles noted, breath sounds reduced in the bases, Pleurx catheter noted on left  Cardiovascular: Regular rate and rhythm, normal S1 and S2, and no murmur noted  GI: Normal bowel sounds, soft, non-distended, non-tender  Skin/Integumen: No rashes, no cyanosis, edema 1+ noted bilateral lower extremity  Neuro : moving all 4 extremities, no focal deficit noted     Medications     0.45% sodium chloride + KCl 20 mEq/L 100 mL/hr at 05/22/20 1151     insulin basal rate for inpatient ambulatory pump         apixaban ANTICOAGULANT  2.5 mg Oral BID     insulin aspart   Device See Admin Instructions     insulin bolus from AMBULATORY PUMP   Subcutaneous 4x Daily AC & HS     polyethylene glycol  17 g Oral BID     pravastatin  20 mg Oral At Bedtime     sodium chloride (PF)  3 mL Intracatheter Q8H     umeclidinium  1 puff Inhalation Daily     verapamil ER  240 mg Oral At Bedtime       Data    Recent Labs   Lab 05/22/20  1155 05/22/20  0645 05/22/20  0215  05/21/20 1916 05/21/20  1302   WBC  --   --   --   --  13.6* 15.7*   HGB  --   --   --   --  8.9* 9.6*   MCV  --   --   --   --  86 87   PLT  --   --   --   --  446 537*    138 139   < > 139 137   POTASSIUM 3.2* 3.2* 3.4   < > 3.4 4.5   CHLORIDE 103 104 105   < > 104 97   CO2 PENDING 30 28   < > 24 20   BUN PENDING 18 21   < > 24 21   CR PENDING 1.61* 1.66*   < > 1.76* 1.81*   ANIONGAP PENDING 4 6   < > 11 20*   LLOYD PENDING 8.3* 8.1*   < > 8.4* 8.9   GLC PENDING 130* 152*   < > 273* 530*   ALBUMIN  --   --   --   --  2.6*  --    PROTTOTAL  --   --   --   --  5.4*  --    BILITOTAL  --   --   --   --  0.6  --    ALKPHOS  --   --   --   --  107  --    ALT  --   --   --   --  24  --    AST  --   --   --   --  27  --    TROPI  --   --   --   --   --  0.034    < > = values in this interval not displayed.     Recent Labs   Lab 05/22/20  1155 05/22/20  1122 05/22/20  1013 05/22/20  0758 05/22/20  0645 05/22/20  0557 05/22/20  0415  05/22/20 0215  05/21/20  2240  05/21/20 1916   GLC PENDING  --   --   --  130*  --   --   --  152*  --  71  --  273*   BGM  --  165* 163* 141*  --  137* 141*   < >  --    < >  --    < >  --     < > = values in this interval not displayed.       Imaging:   Recent Results (from the past 24 hour(s))   XR Chest Port 1 View    Narrative    CHEST ONE VIEW  5/21/2020 1:30 PM     HISTORY: Shortness of breath, recent PleurX catheter placement.    COMPARISON: May 10, 2020      Impression    IMPRESSION: Decreased fluid and associated atelectasis and/or  infiltrate on the left compared to previous. Multiple minimally  displaced rib fractures again evident. Right lung clear. No  pneumothorax.    ALLYSON MARTINEZ MD   XR Abdomen Port 1 View    Narrative    ABDOMEN ONE VIEW PORTABLE  5/21/2020 1:45 PM     HISTORY: Vomiting, 1 view.    COMPARISON: None.       Impression    IMPRESSION: Moderate to large amount of stool. Nonobstructed bowel  gas  pattern.    ALLYSON MARTINEZ MD

## 2020-05-23 ENCOUNTER — APPOINTMENT (OUTPATIENT)
Dept: PHYSICAL THERAPY | Facility: CLINIC | Age: 80
DRG: 637 | End: 2020-05-23
Attending: INTERNAL MEDICINE
Payer: COMMERCIAL

## 2020-05-23 ENCOUNTER — APPOINTMENT (OUTPATIENT)
Dept: OCCUPATIONAL THERAPY | Facility: CLINIC | Age: 80
DRG: 637 | End: 2020-05-23
Attending: INTERNAL MEDICINE
Payer: COMMERCIAL

## 2020-05-23 LAB
ANION GAP SERPL CALCULATED.3IONS-SCNC: 10 MMOL/L (ref 3–14)
ANION GAP SERPL CALCULATED.3IONS-SCNC: 6 MMOL/L (ref 3–14)
ANION GAP SERPL CALCULATED.3IONS-SCNC: 7 MMOL/L (ref 3–14)
ANION GAP SERPL CALCULATED.3IONS-SCNC: 8 MMOL/L (ref 3–14)
BUN SERPL-MCNC: 13 MG/DL (ref 7–30)
BUN SERPL-MCNC: 14 MG/DL (ref 7–30)
CALCIUM SERPL-MCNC: 7.8 MG/DL (ref 8.5–10.1)
CALCIUM SERPL-MCNC: 7.9 MG/DL (ref 8.5–10.1)
CALCIUM SERPL-MCNC: 7.9 MG/DL (ref 8.5–10.1)
CALCIUM SERPL-MCNC: 8.2 MG/DL (ref 8.5–10.1)
CHLORIDE SERPL-SCNC: 102 MMOL/L (ref 94–109)
CHLORIDE SERPL-SCNC: 103 MMOL/L (ref 94–109)
CHLORIDE SERPL-SCNC: 103 MMOL/L (ref 94–109)
CHLORIDE SERPL-SCNC: 104 MMOL/L (ref 94–109)
CO2 SERPL-SCNC: 25 MMOL/L (ref 20–32)
CO2 SERPL-SCNC: 26 MMOL/L (ref 20–32)
CO2 SERPL-SCNC: 26 MMOL/L (ref 20–32)
CO2 SERPL-SCNC: 28 MMOL/L (ref 20–32)
CREAT SERPL-MCNC: 1.42 MG/DL (ref 0.66–1.25)
CREAT SERPL-MCNC: 1.42 MG/DL (ref 0.66–1.25)
CREAT SERPL-MCNC: 1.48 MG/DL (ref 0.66–1.25)
CREAT SERPL-MCNC: 1.51 MG/DL (ref 0.66–1.25)
ERYTHROCYTE [DISTWIDTH] IN BLOOD BY AUTOMATED COUNT: 18.7 % (ref 10–15)
GFR SERPL CREATININE-BSD FRML MDRD: 43 ML/MIN/{1.73_M2}
GFR SERPL CREATININE-BSD FRML MDRD: 44 ML/MIN/{1.73_M2}
GFR SERPL CREATININE-BSD FRML MDRD: 46 ML/MIN/{1.73_M2}
GFR SERPL CREATININE-BSD FRML MDRD: 46 ML/MIN/{1.73_M2}
GLUCOSE BLDC GLUCOMTR-MCNC: 103 MG/DL (ref 70–99)
GLUCOSE BLDC GLUCOMTR-MCNC: 119 MG/DL (ref 70–99)
GLUCOSE BLDC GLUCOMTR-MCNC: 147 MG/DL (ref 70–99)
GLUCOSE BLDC GLUCOMTR-MCNC: 153 MG/DL (ref 70–99)
GLUCOSE BLDC GLUCOMTR-MCNC: 253 MG/DL (ref 70–99)
GLUCOSE BLDC GLUCOMTR-MCNC: 288 MG/DL (ref 70–99)
GLUCOSE BLDC GLUCOMTR-MCNC: 51 MG/DL (ref 70–99)
GLUCOSE BLDC GLUCOMTR-MCNC: 58 MG/DL (ref 70–99)
GLUCOSE BLDC GLUCOMTR-MCNC: 59 MG/DL (ref 70–99)
GLUCOSE BLDC GLUCOMTR-MCNC: 62 MG/DL (ref 70–99)
GLUCOSE BLDC GLUCOMTR-MCNC: 97 MG/DL (ref 70–99)
GLUCOSE SERPL-MCNC: 163 MG/DL (ref 70–99)
GLUCOSE SERPL-MCNC: 200 MG/DL (ref 70–99)
GLUCOSE SERPL-MCNC: 239 MG/DL (ref 70–99)
GLUCOSE SERPL-MCNC: 97 MG/DL (ref 70–99)
HCT VFR BLD AUTO: 26.3 % (ref 40–53)
HGB BLD-MCNC: 8.4 G/DL (ref 13.3–17.7)
KETONES BLD-SCNC: 0 MMOL/L (ref 0–0.6)
KETONES BLD-SCNC: 1.1 MMOL/L (ref 0–0.6)
KETONES BLD-SCNC: 1.7 MMOL/L (ref 0–0.6)
MCH RBC QN AUTO: 27.3 PG (ref 26.5–33)
MCHC RBC AUTO-ENTMCNC: 31.9 G/DL (ref 31.5–36.5)
MCV RBC AUTO: 85 FL (ref 78–100)
PHOSPHATE SERPL-MCNC: 2.2 MG/DL (ref 2.5–4.5)
PHOSPHATE SERPL-MCNC: 2.4 MG/DL (ref 2.5–4.5)
PHOSPHATE SERPL-MCNC: 2.4 MG/DL (ref 2.5–4.5)
PHOSPHATE SERPL-MCNC: 3 MG/DL (ref 2.5–4.5)
PHOSPHATE SERPL-MCNC: 3 MG/DL (ref 2.5–4.5)
PHOSPHATE SERPL-MCNC: 3.1 MG/DL (ref 2.5–4.5)
PLATELET # BLD AUTO: 360 10E9/L (ref 150–450)
POTASSIUM SERPL-SCNC: 3.7 MMOL/L (ref 3.4–5.3)
POTASSIUM SERPL-SCNC: 3.9 MMOL/L (ref 3.4–5.3)
POTASSIUM SERPL-SCNC: 4 MMOL/L (ref 3.4–5.3)
POTASSIUM SERPL-SCNC: 4.2 MMOL/L (ref 3.4–5.3)
RBC # BLD AUTO: 3.08 10E12/L (ref 4.4–5.9)
SODIUM SERPL-SCNC: 135 MMOL/L (ref 133–144)
SODIUM SERPL-SCNC: 137 MMOL/L (ref 133–144)
SODIUM SERPL-SCNC: 138 MMOL/L (ref 133–144)
SODIUM SERPL-SCNC: 138 MMOL/L (ref 133–144)
WBC # BLD AUTO: 12 10E9/L (ref 4–11)

## 2020-05-23 PROCEDURE — 36415 COLL VENOUS BLD VENIPUNCTURE: CPT | Performed by: HOSPITALIST

## 2020-05-23 PROCEDURE — 25000132 ZZH RX MED GY IP 250 OP 250 PS 637: Performed by: HOSPITALIST

## 2020-05-23 PROCEDURE — 12000000 ZZH R&B MED SURG/OB

## 2020-05-23 PROCEDURE — 25800030 ZZH RX IP 258 OP 636: Performed by: HOSPITALIST

## 2020-05-23 PROCEDURE — 97535 SELF CARE MNGMENT TRAINING: CPT | Mod: GO

## 2020-05-23 PROCEDURE — 82010 KETONE BODYS QUAN: CPT | Performed by: HOSPITALIST

## 2020-05-23 PROCEDURE — 80048 BASIC METABOLIC PNL TOTAL CA: CPT | Performed by: HOSPITALIST

## 2020-05-23 PROCEDURE — 97110 THERAPEUTIC EXERCISES: CPT | Mod: GP | Performed by: PHYSICAL THERAPIST

## 2020-05-23 PROCEDURE — 84100 ASSAY OF PHOSPHORUS: CPT | Performed by: HOSPITALIST

## 2020-05-23 PROCEDURE — 85027 COMPLETE CBC AUTOMATED: CPT | Performed by: HOSPITALIST

## 2020-05-23 PROCEDURE — 25800025 ZZH RX 258: Performed by: INTERNAL MEDICINE

## 2020-05-23 PROCEDURE — 99222 1ST HOSP IP/OBS MODERATE 55: CPT | Performed by: INTERNAL MEDICINE

## 2020-05-23 PROCEDURE — 40000895 ZZH STATISTIC SLP IP EVAL DEFER: Performed by: SPEECH-LANGUAGE PATHOLOGIST

## 2020-05-23 PROCEDURE — 97530 THERAPEUTIC ACTIVITIES: CPT | Mod: GP | Performed by: PHYSICAL THERAPIST

## 2020-05-23 PROCEDURE — 00000146 ZZHCL STATISTIC GLUCOSE BY METER IP

## 2020-05-23 PROCEDURE — 97161 PT EVAL LOW COMPLEX 20 MIN: CPT | Mod: GP | Performed by: PHYSICAL THERAPIST

## 2020-05-23 PROCEDURE — 99232 SBSQ HOSP IP/OBS MODERATE 35: CPT | Performed by: HOSPITALIST

## 2020-05-23 PROCEDURE — 25000131 ZZH RX MED GY IP 250 OP 636 PS 637: Performed by: HOSPITALIST

## 2020-05-23 PROCEDURE — 97530 THERAPEUTIC ACTIVITIES: CPT | Mod: GO

## 2020-05-23 PROCEDURE — 25000128 H RX IP 250 OP 636: Performed by: HOSPITALIST

## 2020-05-23 PROCEDURE — 25000128 H RX IP 250 OP 636: Performed by: INTERNAL MEDICINE

## 2020-05-23 PROCEDURE — 25000125 ZZHC RX 250: Performed by: HOSPITALIST

## 2020-05-23 RX ORDER — SODIUM CHLORIDE 9 MG/ML
INJECTION, SOLUTION INTRAVENOUS CONTINUOUS
Status: DISCONTINUED | OUTPATIENT
Start: 2020-05-23 | End: 2020-05-23

## 2020-05-23 RX ORDER — NICOTINE POLACRILEX 4 MG
15-30 LOZENGE BUCCAL
Status: DISCONTINUED | OUTPATIENT
Start: 2020-05-23 | End: 2020-05-23

## 2020-05-23 RX ORDER — AMOXICILLIN 250 MG
1 CAPSULE ORAL 2 TIMES DAILY
Status: DISCONTINUED | OUTPATIENT
Start: 2020-05-23 | End: 2020-05-29 | Stop reason: HOSPADM

## 2020-05-23 RX ORDER — DEXTROSE MONOHYDRATE 25 G/50ML
25-50 INJECTION, SOLUTION INTRAVENOUS
Status: DISCONTINUED | OUTPATIENT
Start: 2020-05-23 | End: 2020-05-23

## 2020-05-23 RX ORDER — DEXTROSE MONOHYDRATE 50 MG/ML
INJECTION, SOLUTION INTRAVENOUS CONTINUOUS
Status: DISCONTINUED | OUTPATIENT
Start: 2020-05-23 | End: 2020-05-23

## 2020-05-23 RX ADMIN — ONDANSETRON 4 MG: 4 TABLET, ORALLY DISINTEGRATING ORAL at 03:27

## 2020-05-23 RX ADMIN — VERAPAMIL HYDROCHLORIDE 240 MG: 240 TABLET, FILM COATED, EXTENDED RELEASE ORAL at 21:04

## 2020-05-23 RX ADMIN — DEXTROSE MONOHYDRATE 25 ML: 25 INJECTION, SOLUTION INTRAVENOUS at 19:38

## 2020-05-23 RX ADMIN — ONDANSETRON 4 MG: 2 INJECTION INTRAMUSCULAR; INTRAVENOUS at 07:04

## 2020-05-23 RX ADMIN — INSULIN ASPART 3 UNITS: 100 INJECTION, SOLUTION INTRAVENOUS; SUBCUTANEOUS at 08:37

## 2020-05-23 RX ADMIN — PRAVASTATIN SODIUM 20 MG: 20 TABLET ORAL at 21:04

## 2020-05-23 RX ADMIN — PIPERACILLIN SODIUM AND TAZOBACTAM SODIUM 3.38 G: 3; .375 INJECTION, POWDER, LYOPHILIZED, FOR SOLUTION INTRAVENOUS at 03:17

## 2020-05-23 RX ADMIN — INSULIN ASPART 7.8 UNITS: 100 INJECTION, SOLUTION INTRAVENOUS; SUBCUTANEOUS at 11:17

## 2020-05-23 RX ADMIN — SODIUM CHLORIDE: 9 INJECTION, SOLUTION INTRAVENOUS at 08:25

## 2020-05-23 RX ADMIN — PIPERACILLIN SODIUM AND TAZOBACTAM SODIUM 3.38 G: 3; .375 INJECTION, POWDER, LYOPHILIZED, FOR SOLUTION INTRAVENOUS at 08:25

## 2020-05-23 RX ADMIN — APIXABAN 2.5 MG: 2.5 TABLET, FILM COATED ORAL at 08:33

## 2020-05-23 RX ADMIN — DEXTROSE MONOHYDRATE 50 ML: 25 INJECTION, SOLUTION INTRAVENOUS at 00:26

## 2020-05-23 RX ADMIN — APIXABAN 2.5 MG: 2.5 TABLET, FILM COATED ORAL at 21:04

## 2020-05-23 RX ADMIN — UMECLIDINIUM 1 PUFF: 62.5 AEROSOL, POWDER ORAL at 08:33

## 2020-05-23 RX ADMIN — SODIUM PHOSPHATE, MONOBASIC, MONOHYDRATE 15 MMOL: 276; 142 INJECTION, SOLUTION INTRAVENOUS at 11:10

## 2020-05-23 RX ADMIN — DEXTROSE MONOHYDRATE: 50 INJECTION, SOLUTION INTRAVENOUS at 00:48

## 2020-05-23 ASSESSMENT — ACTIVITIES OF DAILY LIVING (ADL)
ADLS_ACUITY_SCORE: 15
ADLS_ACUITY_SCORE: 15
ADLS_ACUITY_SCORE: 16

## 2020-05-23 NOTE — PROGRESS NOTES
[facility]    Hospitalist Progress Note    Assessment & Plan   Tc Garcia is a 80 year old male who was admitted on 5/21/2020.  Patient was recently discharged from [facility] following a stay from 5/10 to 5/14.  He is currently admitted for following DKA.  He had a left Pleurx catheter placed during his prior admission for recurrent pleural effusion  DKA due to lack of insulin his insulin pump  Some concern with cognition because upon restart of his pumon on 5/22, he did develop hyperglycemia followed by hypoglylcemia  Yet SLUMS screen was negative  Plan  - resume insulin pump. He had issues overnight, but appears to be very capable to manage his own insulin pump  - if he is unable to manage his sugars appropriately in the next 24 hours then we will be forced to transition to subcutaneous insulin    Shortness of breath multifactorial due to recurrent transudative pleural effusion s/p pleurx catheter placement, pulmonary hypertension, and HFpEF  Chest CT revealed no pneumonia, improvement in the pleural effusion with pleurx drainage  COVID negative  Plan  - stop antibiotics  - prn pleurx drainage  - appreciate cardiology seeing the patient, will work to getting him diuresed prior to starting the viagra for the pulmonary hypertension  - hold losartan/hctz  - prn hydralazine    Chronic atrial fibrillation and flutter  - continue apixiban and verapamil    Hypokalemia  - monitor    DVT Prophylaxis: Patient is on Eliquis  Code Status: Prior     Disposition: Expected discharge in 2-3 days    I spoke to the patient's daughter and the patient in depth about his myriad medical problems.    Yahir Wilhelm DO  Hospitalist [facility]  Pager: [phone]      Interval History   Patient stopped his pump last night because of hypoglycemia. Not entirely sure why, but the patient states he sometimes turns it off at home. He would like to try and get back on the pump rather than switch to  subcutaneous      -Data reviewed today: I reviewed all new labs and imaging results over the last 24 hours.  Physical Exam   Temp: 97.8  F (36.6  C) Temp src: Oral BP: 127/65 Pulse: 69 Heart Rate: 64 Resp: 25 SpO2: 94 % O2 Device: None (Room air)    There were no vitals filed for this visit.  Vital Signs with Ranges  Temp:  [97.7  F (36.5  C)-98.2  F (36.8  C)] 97.8  F (36.6  C)  Pulse:  [68-92] 69  Heart Rate:  [64-93] 64  Resp:  [11-25] 25  BP: (119-169)/(65-96) 127/65  SpO2:  [94 %-100 %] 94 %  I/O last 3 completed shifts:  In: 4642.67 [P.O.:1650; I.V.:2992.67]  Out: 825 [Urine:125; Chest Tube:700]    Constitutional: Awake, alert, cooperative, no apparent distress, mild respiratory distress noted, he is very hard of hearing  Respiratory: Bilateral basilar crackles noted, breath sounds reduced in the bases, Pleurx catheter noted on left  Cardiovascular: Regular rate and rhythm, normal S1 and S2, and no murmur noted  GI: Normal bowel sounds, soft, non-distended, non-tender  Skin/Integumen: No rashes, no cyanosis, edema 1+ noted bilateral lower extremity  Neuro : moving all 4 extremities, no focal deficit noted     Medications     insulin basal rate for inpatient ambulatory pump         apixaban ANTICOAGULANT  2.5 mg Oral BID     insulin aspart   Device See Admin Instructions     insulin aspart   Device See Admin Instructions     insulin bolus from AMBULATORY PUMP   Subcutaneous TID AC     insulin bolus from AMBULATORY PUMP   Subcutaneous 4x Daily AC & HS     polyethylene glycol  17 g Oral BID     pravastatin  20 mg Oral At Bedtime     senna-docusate  1 tablet Oral BID     sodium chloride (PF)  3 mL Intracatheter Q8H     umeclidinium  1 puff Inhalation Daily     verapamil ER  240 mg Oral At Bedtime       Data   Recent Labs   Lab 05/23/20  1420 05/23/20  0555 05/23/20  0158  05/21/20  1916 05/21/20  1302   WBC  --  12.0*  --   --  13.6* 15.7*   HGB  --  8.4*  --   --  8.9* 9.6*   MCV  --  85  --   --  86 87   PLT  --   360  --   --  446 537*    137 138   < > 139 137   POTASSIUM 3.7 4.0 3.9   < > 3.4 4.5   CHLORIDE 103 103 104   < > 104 97   CO2 25 26 28   < > 24 20   BUN 13 13 14   < > 24 21   CR 1.51* 1.42* 1.48*   < > 1.76* 1.81*   ANIONGAP 10 8 6   < > 11 20*   LLOYD 7.9* 7.8* 7.9*   < > 8.4* 8.9   * 239* 97   < > 273* 530*   ALBUMIN  --   --   --   --  2.6*  --    PROTTOTAL  --   --   --   --  5.4*  --    BILITOTAL  --   --   --   --  0.6  --    ALKPHOS  --   --   --   --  107  --    ALT  --   --   --   --  24  --    AST  --   --   --   --  27  --    TROPI  --   --   --   --   --  0.034    < > = values in this interval not displayed.     Recent Labs   Lab 05/23/20  1420 05/23/20  1239 05/23/20  0818 05/23/20  0555 05/23/20  0418 05/23/20  0158 05/23/20  0047 05/23/20  0023  05/22/20  2143  05/22/20  1804   *  --   --  239*  --  97  --   --   --  84  --  218*   BGM  --  253* 288*  --  147*  --  97 51*   < >  --    < >  --     < > = values in this interval not displayed.       Imaging:   Recent Results (from the past 24 hour(s))   CT Chest w/o Contrast    Narrative    CT CHEST W/O CONTRAST 5/22/2020 4:01 PM     HISTORY: Pneumonia, unresolved or complicated    TECHNIQUE: CT scan of the chest was performed without IV contrast.  Multiplanar reformats were obtained. Dose reduction techniques were  used.  CONTRAST: None.  COMPARISON: 5/11/2020    FINDINGS:     LUNGS AND PLEURA: Left pleural effusion has significantly decreased in  size when compared to previous, status post Pleurx catheter placement.  There is only a small residual amount of pleural fluid. There is  rounded atelectasis in the left lower lobe and scarring or atelectasis  in the lingula. No definite pneumonia. There is a small, freely  layering right pleural effusion, also decreased from previous. Few  calcified pleural plaques noted. Previously seen groundglass  infiltrates in the right upper lobe have nearly completely resolved,  with only minimal  residual groundglass opacity in the periphery of the  right apex on series 3 image 11.    MEDIASTINUM/AXILLAE: Calcified mediastinal and left hilar lymph nodes  compatible with old granulomatous disease. Severe coronary artery  calcification. No lymphadenopathy.    UPPER ABDOMEN: No acute findings.    MUSCULOSKELETAL: Multiple old bilateral rib fractures again seen. Some  of the left rib fractures are moderately displaced and ununited. No  change from previous.      Impression    IMPRESSION:  1.  Left pleural effusion has nearly completely resolved status post  placement of Pleurx catheter.  2.  Areas of rounded atelectasis and scarring in the left lung. No  definite pneumonia.  3.  On the prior study there were extensive groundglass infiltrates in  the right upper lobe. These have nearly completely resolved. A small  right pleural effusion is smaller.    CEDRICK GERMAIN MD

## 2020-05-23 NOTE — PROVIDER NOTIFICATION
Md Cordoba contacted due to critical Ketone value and new onset nausea; Received orders to stop D5W and Give additional IV dose of Zofran.

## 2020-05-23 NOTE — PLAN OF CARE
VSS, denies pain, patient transitioned back to own insulin pump, managing BG checks, insulin dosages and carb count independently. Tolerating meals, voiding, BM x 2, Phos replaced per protocol, IVF stopped, Tele SR. Ace Wraps to bilateral LE. Patients daughter updated on POC.

## 2020-05-23 NOTE — PROGRESS NOTES
Called by nursing staff with report of patient with low blood sugar and poor ability to manage insulin pump, orders submitted for D5W and insulin sliding scale.

## 2020-05-23 NOTE — PLAN OF CARE
SLP: ST screened/deferred evaluation on 5/22. Please see note. RN denied s/sx of aspiration with meals. No IP SLP needs. Will complete orders.

## 2020-05-23 NOTE — PLAN OF CARE
"Discharge Planner PT   Patient plan for discharge: \"I don't want to go to a TCU\"    Current status: Evaluation completed, treatment initiated. 81 yo male adm with DKA. Presents alert and oriented x 3, states he came in because of SOB. When asked about his blood sugar he states \"I went to bed knowing my pump was low, but I guess it ran out.\"     Sit<>stand SBA. Completed standing balance/strength exercises and ambulated with WW x 200+ feet,use of WW and SBA. Stable response with activity    Barriers to return to prior living situation: None based on mobility     Recommendations for discharge: Home with home PT    Rationale for recommendations: Pt will benefit from home PT services to continue to improve general strength and functional activity tolerance for optimal functional mobility.        Entered by: Maile Zuluaga 05/23/2020 1:37 PM       "

## 2020-05-23 NOTE — PROGRESS NOTES
"   05/23/20 1220   Quick Adds   Type of Visit Initial PT Evaluation   Living Environment   Lives With spouse   Living Arrangements house   Home Accessibility stairs to enter home;stairs within home   Number of Stairs, Main Entrance 3   Number of Stairs, Within Home, Primary   (26)   Transportation Anticipated family or friend will provide   Living Environment Comment Pt does have a walker and a tub bench but does not use them. Pt does use a grab bar for the tub shower transfer.   Self-Care   Usual Activity Tolerance good   Current Activity Tolerance moderate   Regular Exercise Yes   Equipment Currently Used at Home grab bar, tub/shower   Activity/Exercise/Self-Care Comment Patient normally does the shopping but son in law doing it since COVID 19. Patient does the cooking.    Functional Level Prior   Ambulation 0-->independent   Transferring 0-->independent   Toileting 0-->independent   Bathing 0-->independent   Which of the above functional risks had a recent onset or change? toileting;transferring;ambulation;bathing;dressing   Prior Functional Level Comment Pt ind w/ all ADL's/IADL's w/o A.D. PTA, including driving. Pt does the cooking at home.    General Information   Onset of Illness/Injury or Date of Surgery - Date 05/21/20   Referring Physician Radha Fu MD   Patient/Family Goals Statement home   Pertinent History of Current Problem (include personal factors and/or comorbidities that impact the POC) 79 yo male adm with DKA.  possibly have a pneumonia.   Precautions/Limitations fall precautions   Cognitive Status Examination   Orientation orientation to person, place and time   Level of Consciousness alert   Follows Commands and Answers Questions 100% of the time   Cognitive Comment Pt states he is here because of feeling short of breath. STates \"I was just here and  now I\"m back.\"   Pain Assessment   Patient Currently in Pain No   Posture    Posture Forward head position;Protracted shoulders;Kyphosis " "  Range of Motion (ROM)   ROM Comment IMpaired neck ROM, keeps cervcial spine in extension with trunk flexion   Strength   Strength Comments Demonstrates antigravity strength with moility   Bed Mobility   Bed Mobility Comments Not assessed   Transfer Skills   Transfer Comments Sit>stand SBA   Gait   Gait Comments Bedside gait with WW and CGA   Balance   Balance Comments Good static and dynamic sitting blance, static standing balance good. Dynamic standing balance in need of UE support for stability.    Sensory Examination   Sensory Perception Comments Numbness and tingling in the feet   General Therapy Interventions   Planned Therapy Interventions balance training;gait training;neuromuscular re-education;ROM;strengthening;stretching;transfer training;progressive activity/exercise;home program guidelines   Clinical Impression   Criteria for Skilled Therapeutic Intervention yes, treatment indicated   PT Diagnosis Impaired dynamic balance   Influenced by the following impairments Decreased functional activity toelrance and generalized weakness.   Functional limitations due to impairments Decreased independence with gait   Clinical Presentation Stable/Uncomplicated   Clinical Presentation Rationale see MR   Clinical Decision Making (Complexity) Low complexity   Therapy Frequency Daily   Predicted Duration of Therapy Intervention (days/wks) 2 days   Anticipated Discharge Disposition Home with Home Therapy   Risk & Benefits of therapy have been explained Yes   Patient, Family & other staff in agreement with plan of care Yes   [facility] AM-PAC  \"6 Clicks\" V.2 Basic Mobility Inpatient Short Form   1. Turning from your back to your side while in a flat bed without using bedrails? 4 - None   2. Moving from lying on your back to sitting on the side of a flat bed without using bedrails? 4 - None   3. Moving to and from a bed to a chair (including a wheelchair)? 4 - None   4. Standing up from a chair using your arms " (e.g., wheelchair, or bedside chair)? 4 - None   5. To walk in hospital room? 3 - A Little   6. Climbing 3-5 steps with a railing? 3 - A Little   Basic Mobility Raw Score (Score out of 24.Lower scores equate to lower levels of function) 22   Total Evaluation Time   Total Evaluation Time (Minutes) 10

## 2020-05-23 NOTE — PLAN OF CARE
Discharge Planner OT   Patient plan for discharge: Home    Current status: Pt completed cognitive screen which is WNL (SLUMS 28/30). Pt completed functional transfers and seated LE dressing task with SBA.     Barriers to return to prior living situation: Stairs; Fall risk; Bathtub    Recommendations for discharge: Home with A from wife for IADLs as needed and home OT    Rationale for recommendations: Pt making gains with OT intervention; however, continues to be limited by impaired safety, balance, and activity tolerance. Pt states wife is very supportive at home.        Entered by: Kimberli Hitchcock 05/23/2020 2:42 PM

## 2020-05-23 NOTE — CONSULTS
"[facility]    Cardiology Consultation     Date of Admission:  5/21/2020    Assessment & Plan   Tc Garcia is a 80 year old male who was admitted on 5/21/2020. I was asked to see the patient for diabetic ketoacidosis.  Cardiology was consulted to comment on his pulmonary hypertension.    The patient has a very limited understanding of his medical comorbidities.  When asked if he had recently seen the cardiologist he says \"maybe\" he did not recall the specifics in regards to his recent outpatient cardiology consult.  He was also not able to identify that he had pulmonary hypertension.  He did know that he was on Viagra at some point.    The patient was admitted because he forgot to put a insulin refill into his insulin pump.  Dr. Bonilla has a wonderfully outlined plan in the most recent cardiology outpatient note.  Dr. Bonilla has also highlighted the fact that the patient would benefit from face-to-face visits.  In my opinion this is complicated by his limited capacity to navigate his medical comorbidities.    Currently the patient is volume overloaded and is not being treated with sildenafil due to the fact that he did not achieve normal blood pressures and volume status prior to  being admitted to the hospital.  The patient stated that he made it only a few days out of the hospital before he got worse again.    I am concerned that a complex medical regimen may not be achievable for this patient in his current capacity.     We will work to optimize his volume status, restore electrolyte balance and optimize him to reinitiate therapy with sildenafil for his pulmonary hypertension.    However the bigger question is whether he will to be able to successfully navigate his health care independently.    1.  Pulmonary hypertension out of proportion to degree of left heart failure.   2.  Heart failure with preserved ejection fraction, LVEF 55%-60%, NYHA class III.   3.  Recurrent left pleural effusion with " PleurX in place following traumatic fall and hemothorax in 10/2019.   4.  Stage III chronic kidney disease.   5.  Type 2 diabetes mellitus, on long-term insulin therapy.   6.  Uncontrolled hypertension.   7.  Chronic atrial fibrillation and flutter.  Rate controlled with verapamil 240 mg daily and low-dose Eliquis 2.5 mg 2 times daily.   8.  Chronic anemia.   9.  Elderly frailty.     Plan:  Currently treatment of his electrolyte imbalances and DKA is precluding aggressive diuresis which the patient needs.    Once his electrolyte imbalances are corrected per the hospitalist service, initiation of IV diuretic therapy to achieve a goal weight of approximately 150 pounds would be reasonable.    Currently the patient is receiving IV fluids in the form of normal saline as well as IV electrolyte replacement which should be stopped as early as possible.    Advised nursing that the patient wrap his legs with Ace wraps to mobilize fluid as there is significant volume overload in the legs.    I agree with Dr. Bonilla that a reasonable medication regimen would include losartan 25 mg daily, torsemide 10 mg daily (after IV diuretics are used to achieve euvolemia), potassium 40 mill equivalents daily, clonidine 0.3 mg twice daily once the patient is stable from a DKA standpoint.    Once the patient achieves normal electrolyte status, is out of DKA completely, achieves euvolemia, is normotensive, and is stable on oral diuretic dose reinitiation of sildenafil at 20 mg daily could be considered.    Manny Cleveland MD     Code Status    Prior    Reason for Consult   Reason for consult: Pulmonary hypertension    Primary Care Physician   Senthil Moya    Chief Complaint   DKA    History is obtained from the patient    History of Present Illness   Tc Garcia is a 80 year old male who presents with diabetic ketoacidosis.  The patient has a history of pulmonary hypertension with heart failure with preserved ejection  fraction.    The patient has been seen recently in the cardiology clinic for further evaluation of his hypertension, pulmonary hypertension.  Patient had medication changes on 5/18/2020 and is unable to relate whether these were helpful to him.  Patient is not able to verbalize a clear understanding of his medical conditions.  The patient was not fully aware of the fact that he had pulmonary hypertension or what pulmonary hypertension is.  The patient had a prolonged visit with a very thoughtful cardiologist in outpatient setting.  It is clear that a very thorough meeting was undertaken given the complexity of the note by Dr. Bonilla.      The patient forgot to put insulin in his insulin pump and was admitted with DKA.  He has had multiple recent encounters with a healthcare system.  He was recently discharged for recurrent pleural effusions and heart failure.    Currently the patient does not have any specific complaints other than dyspnea with exertion.    Please see Dr. Bonilla's note on 5/18/2020 as it outlines his recent medical care very nicely.    Currently the patient is being treated for DKA and is volume overloaded.    Past Medical History   I have reviewed this patient's medical history and updated it with pertinent information if needed.   Past Medical History:   Diagnosis Date     Anemia      CKD (chronic kidney disease) stage 3, GFR 30-59 ml/min (H)      Fall 11/2019    suffered multiple left rib fractures, C3 and T2 fractures     Paroxysmal atrial fibrillation (H)      Personal history of colonic polyps 1972    gets colonoscopy every five years, due in 2006     Pleural effusion on left 11/2019    after multiple rib fractures     Rosacea 1989     Type II or unspecified type diabetes mellitus without mention of complication, not stated as uncontrolled 1999     Unspecified essential hypertension 1994       Past Surgical History   I have reviewed this patient's surgical history and updated it with pertinent  information if needed.  Past Surgical History:   Procedure Laterality Date     ANESTHESIA CARDIOVERSION N/A 2/3/2020    Procedure: ANESTHESIA, FOR CARDIOVERSION;  Surgeon: GENERIC ANESTHESIA PROVIDER;  Location:  OR     CV RIGHT HEART CATH N/A 2020    Procedure: Right Heart Cath;  Surgeon: Senthil Silva MD;  Location:  HEART CARDIAC CATH LAB     HC REMOVE TONSILS/ADENOIDS,<11 Y/O      T & A <11y.o.     IR CHEST TUBE DRAIN TUNNELED LEFT  2020       Prior to Admission Medications   Prior to Admission Medications   Prescriptions Last Dose Informant Patient Reported? Taking?   INSULIN PUMP - OUTPATIENT  Self Yes Yes   Sig: Novolog Insulin  BASAL RATES and times:  Midnight to 7:00 am: 0.4 units/hr  7:00 am to 9:00 pm: 0.95 units/hour    9:00pm to midnight: 0.4 units/hr  CARB RATIO: 1 unit per 15 grams  Correction Factor (Sensitivity): 55mg/dL  BLOOD GLUCOSE TARGET and times:  12   AM (midnight): 100 - 140  5:30     AM:  100 - 120  10   PM:  100 - 140  Active Insulin Time:  5 hours   Bolus-Correction 1 unit(s) to lower blood glucose by 55 mg/dL  Checks insulin about 15 times a day manually  (Per Rx, pt uses 50-60 units/day)   LOVASTATIN 20 MG PO TABS 2020 at Unknown time Self No Yes   Si TABLET DAILY AT DINNER   acetaminophen (TYLENOL) 325 MG tablet Past Week at Unknown time Self No Yes   Sig: Take 2 tablets (650 mg) by mouth every 4 hours as needed for mild pain   apixaban ANTICOAGULANT (ELIQUIS) 5 MG tablet 2020 at still on 2.5mg Self No Yes   Sig: Take 1/2 tablet (2.5mg) by mouth twice daily. You will have your labs checked on 20 and your PCP will direct you when it is safe to increase your dose back to 1 tablet (5mg) twice daily.   furosemide (LASIX) 20 MG tablet 2020 at Unknown time Self Yes Yes   Sig: Take 20 mg by mouth daily   glucagon 1 MG kit  Self Yes No   Si mg as needed for low blood sugar   insulin aspart (NovoLOG) inject - to fill ambulatory pump  Self No  Yes   Sig: Use as directed   losartan-hydrochlorothiazide (HYZAAR) 100-25 MG tablet 2020 at Unknown time Self Yes Yes   Sig: Take 1 tablet by mouth daily   nystatin (MYCOSTATIN) 717228 UNIT/GM external cream  Self Yes Yes   Sig: Apply topically 2 times daily as needed    tiotropium (SPIRIVA) 18 MCG inhaled capsule 2020 at Unknown time Self Yes Yes   Sig: Inhale 18 mcg into the lungs daily   torsemide (DEMADEX) 10 MG tablet  Self No No   Sig: Take 1 tablet (10 mg) by mouth every morning   verapamil ER (VERELAN) 240 MG 24 hr capsule 2020 at Unknown time Self No Yes   Sig: Take 1 capsule (240 mg) by mouth At Bedtime      Facility-Administered Medications: None     Allergies   Allergies   Allergen Reactions     No Known Drug Allergies        Social History   I have reviewed this patient's social history and updated it with pertinent information if needed. Tc Garcia  reports that he quit smoking about 12 years ago. His smoking use included cigarettes. He has a 8.00 pack-year smoking history. He has never used smokeless tobacco. He reports previous alcohol use of about 35.0 standard drinks of alcohol per week. He reports previous drug use.    Family History   I have reviewed this patient's family history and updated it with pertinent information if needed.   Family History   Problem Relation Age of Onset     Family History Negative Mother          age 71     Family History Negative Father          age 70     Diabetes Brother         alive age 77     Diabetes Brother         alive age 76     Family History Negative Brother              Family History Negative Brother              Diabetes Sister         alive age74     Family History Negative Sister          age 86     Heart Disease Sister              Family History Negative Sister              Family History Negative Sister              Diabetes Sister      Diabetes Sister      Diabetes  Brother      Diabetes Brother        Review of Systems   The 10 point Review of Systems is negative other than noted in the HPI or here.     Physical Exam   Temp: 97.8  F (36.6  C) Temp src: Oral BP: 137/70 Pulse: 75 Heart Rate: 76 Resp: 18 SpO2: 94 % O2 Device: None (Room air)    Vital Signs with Ranges  Temp:  [97.7  F (36.5  C)-98.2  F (36.8  C)] 97.8  F (36.6  C)  Pulse:  [68-92] 75  Heart Rate:  [68-93] 76  Resp:  [11-21] 18  BP: (119-155)/(70-94) 137/70  SpO2:  [94 %-100 %] 94 %  0 lbs 0 oz    GENERAL APPEARANCE: Alert and oriented x3. No acute distress.  SKIN: Inspection of the skin reveals no rashes, ulcerations, warm, dry  NECK: Supple and symmetric.   Mildly elevated JVP  LUNGS: Clear breath sounds throughout bilaterally, no wheezes, no rhonchi  CARDIOVASCULAR: S1, S2, regular rate and rhythm without any murmurs, gallops, rubs. The carotid pulses were normal and 2+ bilaterally without bruits. Peripheral pulses were 2+ and symmetric.  ABDOMEN: Soft, non-tender, non-distended with normal bowel sounds.  No ascites noted.  EXTREMITIES: Pitting 1-2+ to the knee edema.  NEUROLOGIC:  Normal mood and affect.  Sensation to touch was normal.      Data   Results for orders placed or performed during the hospital encounter of 05/21/20 (from the past 24 hour(s))   Glucose by meter   Result Value Ref Range    Glucose 269 (H) 70 - 99 mg/dL   Basic metabolic panel   Result Value Ref Range    Sodium 138 133 - 144 mmol/L    Potassium 3.9 3.4 - 5.3 mmol/L    Chloride 101 94 - 109 mmol/L    Carbon Dioxide 26 20 - 32 mmol/L    Anion Gap 11 3 - 14 mmol/L    Glucose 349 (H) 70 - 99 mg/dL    Urea Nitrogen 15 7 - 30 mg/dL    Creatinine 1.54 (H) 0.66 - 1.25 mg/dL    GFR Estimate 42 (L) >60 mL/min/[1.73_m2]    GFR Estimate If Black 48 (L) >60 mL/min/[1.73_m2]    Calcium 8.3 (L) 8.5 - 10.1 mg/dL   Ketone Beta-Hydroxybutyrate Quantitative   Result Value Ref Range    Ketone Quantitative 2.6 (HH) 0.0 - 0.6 mmol/L   Phosphorus   Result  Value Ref Range    Phosphorus 2.9 2.5 - 4.5 mg/dL   CT Chest w/o Contrast    Narrative    CT CHEST W/O CONTRAST 5/22/2020 4:01 PM     HISTORY: Pneumonia, unresolved or complicated    TECHNIQUE: CT scan of the chest was performed without IV contrast.  Multiplanar reformats were obtained. Dose reduction techniques were  used.  CONTRAST: None.  COMPARISON: 5/11/2020    FINDINGS:     LUNGS AND PLEURA: Left pleural effusion has significantly decreased in  size when compared to previous, status post Pleurx catheter placement.  There is only a small residual amount of pleural fluid. There is  rounded atelectasis in the left lower lobe and scarring or atelectasis  in the lingula. No definite pneumonia. There is a small, freely  layering right pleural effusion, also decreased from previous. Few  calcified pleural plaques noted. Previously seen groundglass  infiltrates in the right upper lobe have nearly completely resolved,  with only minimal residual groundglass opacity in the periphery of the  right apex on series 3 image 11.    MEDIASTINUM/AXILLAE: Calcified mediastinal and left hilar lymph nodes  compatible with old granulomatous disease. Severe coronary artery  calcification. No lymphadenopathy.    UPPER ABDOMEN: No acute findings.    MUSCULOSKELETAL: Multiple old bilateral rib fractures again seen. Some  of the left rib fractures are moderately displaced and ununited. No  change from previous.      Impression    IMPRESSION:  1.  Left pleural effusion has nearly completely resolved status post  placement of Pleurx catheter.  2.  Areas of rounded atelectasis and scarring in the left lung. No  definite pneumonia.  3.  On the prior study there were extensive groundglass infiltrates in  the right upper lobe. These have nearly completely resolved. A small  right pleural effusion is smaller.    CEDRICK GERMAIN MD   Glucose by meter   Result Value Ref Range    Glucose 272 (H) 70 - 99 mg/dL   Basic metabolic panel   Result  Value Ref Range    Sodium 138 133 - 144 mmol/L    Potassium 3.8 3.4 - 5.3 mmol/L    Chloride 102 94 - 109 mmol/L    Carbon Dioxide 26 20 - 32 mmol/L    Anion Gap 10 3 - 14 mmol/L    Glucose 218 (H) 70 - 99 mg/dL    Urea Nitrogen 16 7 - 30 mg/dL    Creatinine 1.58 (H) 0.66 - 1.25 mg/dL    GFR Estimate 40 (L) >60 mL/min/[1.73_m2]    GFR Estimate If Black 47 (L) >60 mL/min/[1.73_m2]    Calcium 8.2 (L) 8.5 - 10.1 mg/dL   Ketone Beta-Hydroxybutyrate Quantitative   Result Value Ref Range    Ketone Quantitative 0.3 0.0 - 0.6 mmol/L   Phosphorus   Result Value Ref Range    Phosphorus 2.5 2.5 - 4.5 mg/dL   Glucose by meter   Result Value Ref Range    Glucose 140 (H) 70 - 99 mg/dL   Basic metabolic panel   Result Value Ref Range    Sodium 138 133 - 144 mmol/L    Potassium 3.8 3.4 - 5.3 mmol/L    Chloride 104 94 - 109 mmol/L    Carbon Dioxide 29 20 - 32 mmol/L    Anion Gap 5 3 - 14 mmol/L    Glucose 84 70 - 99 mg/dL    Urea Nitrogen 15 7 - 30 mg/dL    Creatinine 1.49 (H) 0.66 - 1.25 mg/dL    GFR Estimate 43 (L) >60 mL/min/[1.73_m2]    GFR Estimate If Black 50 (L) >60 mL/min/[1.73_m2]    Calcium 7.9 (L) 8.5 - 10.1 mg/dL   Ketone Beta-Hydroxybutyrate Quantitative   Result Value Ref Range    Ketone Quantitative 0.1 0.0 - 0.6 mmol/L   Phosphorus   Result Value Ref Range    Phosphorus 2.4 (L) 2.5 - 4.5 mg/dL   Glucose by meter   Result Value Ref Range    Glucose 75 70 - 99 mg/dL   Glucose by meter   Result Value Ref Range    Glucose 62 (L) 70 - 99 mg/dL   Glucose by meter   Result Value Ref Range    Glucose 51 (L) 70 - 99 mg/dL   Glucose by meter   Result Value Ref Range    Glucose 97 70 - 99 mg/dL   Basic metabolic panel   Result Value Ref Range    Sodium 138 133 - 144 mmol/L    Potassium 3.9 3.4 - 5.3 mmol/L    Chloride 104 94 - 109 mmol/L    Carbon Dioxide 28 20 - 32 mmol/L    Anion Gap 6 3 - 14 mmol/L    Glucose 97 70 - 99 mg/dL    Urea Nitrogen 14 7 - 30 mg/dL    Creatinine 1.48 (H) 0.66 - 1.25 mg/dL    GFR Estimate 44 (L) >60  mL/min/[1.73_m2]    GFR Estimate If Black 51 (L) >60 mL/min/[1.73_m2]    Calcium 7.9 (L) 8.5 - 10.1 mg/dL   Ketone Beta-Hydroxybutyrate Quantitative   Result Value Ref Range    Ketone Quantitative 0.0 0.0 - 0.6 mmol/L   Phosphorus   Result Value Ref Range    Phosphorus 2.4 (L) 2.5 - 4.5 mg/dL   Glucose by meter   Result Value Ref Range    Glucose 147 (H) 70 - 99 mg/dL   Basic metabolic panel   Result Value Ref Range    Sodium 137 133 - 144 mmol/L    Potassium 4.0 3.4 - 5.3 mmol/L    Chloride 103 94 - 109 mmol/L    Carbon Dioxide 26 20 - 32 mmol/L    Anion Gap 8 3 - 14 mmol/L    Glucose 239 (H) 70 - 99 mg/dL    Urea Nitrogen 13 7 - 30 mg/dL    Creatinine 1.42 (H) 0.66 - 1.25 mg/dL    GFR Estimate 46 (L) >60 mL/min/[1.73_m2]    GFR Estimate If Black 53 (L) >60 mL/min/[1.73_m2]    Calcium 7.8 (L) 8.5 - 10.1 mg/dL   Ketone Beta-Hydroxybutyrate Quantitative   Result Value Ref Range    Ketone Quantitative 1.7 (HH) 0.0 - 0.6 mmol/L   Phosphorus   Result Value Ref Range    Phosphorus 2.4 (L) 2.5 - 4.5 mg/dL   CBC with platelets   Result Value Ref Range    WBC 12.0 (H) 4.0 - 11.0 10e9/L    RBC Count 3.08 (L) 4.4 - 5.9 10e12/L    Hemoglobin 8.4 (L) 13.3 - 17.7 g/dL    Hematocrit 26.3 (L) 40.0 - 53.0 %    MCV 85 78 - 100 fl    MCH 27.3 26.5 - 33.0 pg    MCHC 31.9 31.5 - 36.5 g/dL    RDW 18.7 (H) 10.0 - 15.0 %    Platelet Count 360 150 - 450 10e9/L   Glucose by meter   Result Value Ref Range    Glucose 288 (H) 70 - 99 mg/dL   Ketone Beta-Hydroxybutyrate Quantitative   Result Value Ref Range    Ketone Quantitative 1.1 (H) 0.0 - 0.6 mmol/L   Phosphorus   Result Value Ref Range    Phosphorus 2.2 (L) 2.5 - 4.5 mg/dL   Glucose by meter   Result Value Ref Range    Glucose 253 (H) 70 - 99 mg/dL

## 2020-05-23 NOTE — PLAN OF CARE
AVSS on RA. Pain controlled with PO. Pleurex drain CDI. Diet CDI. Up with Assist of 1. BS active and audible. Stooling and voiding WDL. BG low overnight. BG checks and subcutaneous insulin ordered.

## 2020-05-24 ENCOUNTER — APPOINTMENT (OUTPATIENT)
Dept: OCCUPATIONAL THERAPY | Facility: CLINIC | Age: 80
DRG: 637 | End: 2020-05-24
Payer: COMMERCIAL

## 2020-05-24 LAB
ANION GAP SERPL CALCULATED.3IONS-SCNC: 6 MMOL/L (ref 3–14)
BUN SERPL-MCNC: 12 MG/DL (ref 7–30)
CALCIUM SERPL-MCNC: 8 MG/DL (ref 8.5–10.1)
CHLORIDE SERPL-SCNC: 103 MMOL/L (ref 94–109)
CO2 SERPL-SCNC: 26 MMOL/L (ref 20–32)
CREAT SERPL-MCNC: 1.35 MG/DL (ref 0.66–1.25)
ERYTHROCYTE [DISTWIDTH] IN BLOOD BY AUTOMATED COUNT: 18.2 % (ref 10–15)
GFR SERPL CREATININE-BSD FRML MDRD: 49 ML/MIN/{1.73_M2}
GLUCOSE BLDC GLUCOMTR-MCNC: 74 MG/DL (ref 70–99)
GLUCOSE SERPL-MCNC: 198 MG/DL (ref 70–99)
HCT VFR BLD AUTO: 27.5 % (ref 40–53)
HGB BLD-MCNC: 9 G/DL (ref 13.3–17.7)
MCH RBC QN AUTO: 27.8 PG (ref 26.5–33)
MCHC RBC AUTO-ENTMCNC: 32.7 G/DL (ref 31.5–36.5)
MCV RBC AUTO: 85 FL (ref 78–100)
PHOSPHATE SERPL-MCNC: 2.3 MG/DL (ref 2.5–4.5)
PHOSPHATE SERPL-MCNC: 2.6 MG/DL (ref 2.5–4.5)
PHOSPHATE SERPL-MCNC: 3.1 MG/DL (ref 2.5–4.5)
PLATELET # BLD AUTO: 348 10E9/L (ref 150–450)
POTASSIUM SERPL-SCNC: 3.9 MMOL/L (ref 3.4–5.3)
RBC # BLD AUTO: 3.24 10E12/L (ref 4.4–5.9)
SODIUM SERPL-SCNC: 135 MMOL/L (ref 133–144)
WBC # BLD AUTO: 12.3 10E9/L (ref 4–11)

## 2020-05-24 PROCEDURE — 25000128 H RX IP 250 OP 636: Performed by: INTERNAL MEDICINE

## 2020-05-24 PROCEDURE — 84100 ASSAY OF PHOSPHORUS: CPT | Performed by: HOSPITALIST

## 2020-05-24 PROCEDURE — 99232 SBSQ HOSP IP/OBS MODERATE 35: CPT | Performed by: HOSPITALIST

## 2020-05-24 PROCEDURE — 12000000 ZZH R&B MED SURG/OB

## 2020-05-24 PROCEDURE — 25000132 ZZH RX MED GY IP 250 OP 250 PS 637: Performed by: HOSPITALIST

## 2020-05-24 PROCEDURE — 80048 BASIC METABOLIC PNL TOTAL CA: CPT | Performed by: HOSPITALIST

## 2020-05-24 PROCEDURE — 85027 COMPLETE CBC AUTOMATED: CPT | Performed by: HOSPITALIST

## 2020-05-24 PROCEDURE — 99233 SBSQ HOSP IP/OBS HIGH 50: CPT | Performed by: INTERNAL MEDICINE

## 2020-05-24 PROCEDURE — 25800030 ZZH RX IP 258 OP 636: Performed by: HOSPITALIST

## 2020-05-24 PROCEDURE — 36415 COLL VENOUS BLD VENIPUNCTURE: CPT | Performed by: HOSPITALIST

## 2020-05-24 PROCEDURE — 97535 SELF CARE MNGMENT TRAINING: CPT | Mod: GO

## 2020-05-24 PROCEDURE — 25000131 ZZH RX MED GY IP 250 OP 636 PS 637: Performed by: HOSPITALIST

## 2020-05-24 PROCEDURE — 00000146 ZZHCL STATISTIC GLUCOSE BY METER IP

## 2020-05-24 PROCEDURE — 25000125 ZZHC RX 250: Performed by: HOSPITALIST

## 2020-05-24 RX ORDER — NICOTINE POLACRILEX 4 MG
15-30 LOZENGE BUCCAL
Status: DISCONTINUED | OUTPATIENT
Start: 2020-05-24 | End: 2020-05-24

## 2020-05-24 RX ORDER — FUROSEMIDE 10 MG/ML
40 INJECTION INTRAMUSCULAR; INTRAVENOUS DAILY
Status: DISCONTINUED | OUTPATIENT
Start: 2020-05-24 | End: 2020-05-26

## 2020-05-24 RX ORDER — DEXTROSE MONOHYDRATE 25 G/50ML
25-50 INJECTION, SOLUTION INTRAVENOUS
Status: DISCONTINUED | OUTPATIENT
Start: 2020-05-24 | End: 2020-05-24

## 2020-05-24 RX ADMIN — PRAVASTATIN SODIUM 20 MG: 20 TABLET ORAL at 21:56

## 2020-05-24 RX ADMIN — INSULIN GLARGINE 6 UNITS: 100 INJECTION, SOLUTION SUBCUTANEOUS at 12:58

## 2020-05-24 RX ADMIN — SODIUM PHOSPHATE, MONOBASIC, MONOHYDRATE 15 MMOL: 276; 142 INJECTION, SOLUTION INTRAVENOUS at 07:44

## 2020-05-24 RX ADMIN — FUROSEMIDE 40 MG: 10 INJECTION, SOLUTION INTRAMUSCULAR; INTRAVENOUS at 10:59

## 2020-05-24 RX ADMIN — APIXABAN 2.5 MG: 2.5 TABLET, FILM COATED ORAL at 07:53

## 2020-05-24 RX ADMIN — APIXABAN 2.5 MG: 2.5 TABLET, FILM COATED ORAL at 19:48

## 2020-05-24 RX ADMIN — VERAPAMIL HYDROCHLORIDE 240 MG: 240 TABLET, FILM COATED, EXTENDED RELEASE ORAL at 21:56

## 2020-05-24 RX ADMIN — UMECLIDINIUM 1 PUFF: 62.5 AEROSOL, POWDER ORAL at 07:46

## 2020-05-24 ASSESSMENT — ACTIVITIES OF DAILY LIVING (ADL)
ADLS_ACUITY_SCORE: 15

## 2020-05-24 NOTE — PROGRESS NOTES
"During shift change nursing educating patient on if he feels the need to turn his pump off to let nursing know. Patient states \" oh, I already did a few min ago, my blood sugar was 75 and now its 45\". Patient checked his own BG with monitor confirmed BG of 45. Tested with Hospital bedside glucometer BG 58. Patient alert, wide awake asymptomatic. States he never feels symptomatic when his BG are low. Given 2 orange juices and pudding patient ate them immediately. On coming nurse will recheck in 15 min.   "

## 2020-05-24 NOTE — PROGRESS NOTES
"[facility]    Cardiology Consultation     Date of Admission:  5/21/2020    Assessment & Plan   Tc Garcia is a 80 year old male who was admitted on 5/21/2020. I was asked to see the patient for diabetic ketoacidosis.  Cardiology was consulted to comment on his pulmonary hypertension.    The patient has a very limited understanding of his medical comorbidities.  When asked if he had recently seen the cardiologist he says \"maybe\" he did not recall the specifics in regards to his recent outpatient cardiology consult.  He was also not able to identify that he had pulmonary hypertension.  He did know that he was on Viagra at some point.    The patient was admitted because he forgot to put a insulin refill into his insulin pump.  Dr. Bonilla has a wonderfully outlined plan in the most recent cardiology outpatient note.  Dr. Bonilla has also highlighted the fact that the patient would benefit from face-to-face visits.  In my opinion this is complicated by his limited capacity to navigate his medical comorbidities.    Currently the patient is volume overloaded and is not being treated with sildenafil due to the fact that he did not achieve normal blood pressures and volume status prior to  being admitted to the hospital.  The patient stated that he made it only a few days out of the hospital before he got worse again.    I am concerned that a complex medical regimen may not be achievable for this patient in his current capacity.     We will work to optimize his volume status, restore electrolyte balance and optimize him to reinitiate therapy with sildenafil for his pulmonary hypertension.    However the bigger question is whether he will to be able to successfully navigate his health care independently.    1.  Pulmonary hypertension out of proportion to degree of left heart failure.   2.  Heart failure with preserved ejection fraction, LVEF 55%-60%, NYHA class III.   3.  Recurrent left pleural effusion with " PleurX in place following traumatic fall and hemothorax in 10/2019.   4.  Stage III chronic kidney disease.   5.  Type 2 diabetes mellitus, on long-term insulin therapy.   6.  Uncontrolled hypertension.   7.  Chronic atrial fibrillation and flutter.  Rate controlled with verapamil 240 mg daily and low-dose Eliquis 2.5 mg 2 times daily.   8.  Chronic anemia.   9.  Elderly frailty.     Plan:  Will start diuresis today (lasix 40mg IV daily).  Please obtain daily weights. Will attempt diuresis to 150 lbs.     Patient continues to have issues with insulin pump (he got low BS last night and then shut off his pump).  The patient remains steadfast in wanting to use his insulin pump as his source of insulin.  However, even in a completely monitored environment he continues to have difficultly (therefore I continued to be concerned about his ability to manage his regimine).     Advised nursing that the patient wrap his legs with Ace wraps to mobilize fluid as there is significant volume overload in the legs.    I agree with Dr. Bonilla that a reasonable medication regimen would include losartan 25 mg daily, torsemide 10 mg daily (after IV diuretics are used to achieve euvolemia), potassium 40 mill equivalents daily, clonidine 0.3 mg twice daily once the patient is stable from a DKA standpoint.    Once the patient achieves normal electrolyte status, is out of DKA completely, achieves euvolemia, is normotensive, and is stable on oral diuretic dose reinitiation of sildenafil at 20 mg daily could be considered.    Manny Cleveland MD     Physical Exam   Temp: 97.8  F (36.6  C) Temp src: Oral BP: 137/84 Pulse: 69 Heart Rate: 90 Resp: 16 SpO2: 96 % O2 Device: None (Room air)    Vital Signs with Ranges  Temp:  [97.1  F (36.2  C)-98.1  F (36.7  C)] 97.8  F (36.6  C)  Pulse:  [69] 69  Heart Rate:  [64-90] 90  Resp:  [16-25] 16  BP: (127-141)/(65-84) 137/84  SpO2:  [94 %-98 %] 96 %  0 lbs 0 oz    GENERAL APPEARANCE: Alert and  "oriented x3. No acute distress.  SKIN: Inspection of the skin reveals no rashes, ulcerations, warm, dry  NECK: Supple and symmetric.   Mildly elevated JVP  LUNGS: Clear breath sounds throughout bilaterally, no wheezes, no rhonchi  CARDIOVASCULAR: S1, S2, regular rate and rhythm without any murmurs, gallops, rubs. The carotid pulses were normal and 2+ bilaterally without bruits. Peripheral pulses were 2+ and symmetric.  ABDOMEN: Soft, non-tender, non-distended with normal bowel sounds.  No ascites noted.  EXTREMITIES: (legs wrapped today) Pitting 1-2+ to the knee edema.  NEUROLOGIC:  Normal mood and affect.  Sensation to touch was normal.      Data   Results for orders placed or performed during the hospital encounter of 05/21/20 (from the past 24 hour(s))   Cardiology IP Consult: Patient to be seen: Routine - within 24 hours; recurrent hospitalizatiosn, pulmonary hypertension, does he need to restart sildenafil?; Consultant may enter orders: Yes; Requesting provider? Hospitalist (if different from at...    Narrative    Manny Cleveland MD     5/23/2020  2:01 PM  [facility]    Cardiology Consultation     Date of Admission:  5/21/2020    Assessment & Plan   Tc Garcia is a 80 year old male who was admitted on 5/21/2020.   I was asked to see the patient for diabetic ketoacidosis.    Cardiology was consulted to comment on his pulmonary   hypertension.    The patient has a very limited understanding of his medical   comorbidities.  When asked if he had recently seen the   cardiologist he says \"maybe\" he did not recall the specifics in   regards to his recent outpatient cardiology consult.  He was also   not able to identify that he had pulmonary hypertension.  He did   know that he was on Viagra at some point.    The patient was admitted because he forgot to put a insulin   refill into his insulin pump.  Dr. Bonilla has a wonderfully   outlined plan in the most recent cardiology outpatient note.  " Dr. Bonilla has also highlighted the fact that the patient would benefit   from face-to-face visits.  In my opinion this is complicated by   his limited capacity to navigate his medical comorbidities.    Currently the patient is volume overloaded and is not being   treated with sildenafil due to the fact that he did not achieve   normal blood pressures and volume status prior to  being admitted   to the hospital.  The patient stated that he made it only a few   days out of the hospital before he got worse again.    I am concerned that a complex medical regimen may not be   achievable for this patient in his current capacity.     We will work to optimize his volume status, restore electrolyte   balance and optimize him to reinitiate therapy with sildenafil   for his pulmonary hypertension.    However the bigger question is whether he will to be able to   successfully navigate his health care independently.    1.  Pulmonary hypertension out of proportion to degree of left   heart failure.   2.  Heart failure with preserved ejection fraction, LVEF 55%-60%,   NYHA class III.   3.  Recurrent left pleural effusion with PleurX in place   following traumatic fall and hemothorax in 10/2019.   4.  Stage III chronic kidney disease.   5.  Type 2 diabetes mellitus, on long-term insulin therapy.   6.  Uncontrolled hypertension.   7.  Chronic atrial fibrillation and flutter.  Rate controlled   with verapamil 240 mg daily and low-dose Eliquis 2.5 mg 2 times   daily.   8.  Chronic anemia.   9.  Elderly frailty.     Plan:  Currently treatment of his electrolyte imbalances and DKA is   precluding aggressive diuresis which the patient needs.    Once his electrolyte imbalances are corrected per the hospitalist   service, initiation of IV diuretic therapy to achieve a goal   weight of approximately 150 pounds would be reasonable.    Currently the patient is receiving IV fluids in the form of   normal saline as well as IV electrolyte  replacement which should   be stopped as early as possible.    Advised nursing that the patient wrap his legs with Ace wraps to   mobilize fluid as there is significant volume overload in the   legs.    I agree with Dr. Bonilla that a reasonable medication regimen would   include losartan 25 mg daily, torsemide 10 mg daily (after IV   diuretics are used to achieve euvolemia), potassium 40 mill   equivalents daily, clonidine 0.3 mg twice daily once the patient   is stable from a DKA standpoint.    Once the patient achieves normal electrolyte status, is out of   DKA completely, achieves euvolemia, is normotensive, and is   stable on oral diuretic dose reinitiation of sildenafil at 20 mg   daily could be considered.    Manny Cleveland MD     Code Status    Prior    Reason for Consult   Reason for consult: Pulmonary hypertension    Primary Care Physician   Senthil Moya    Chief Complaint   DKA    History is obtained from the patient    History of Present Illness   Tc Garcia is a 80 year old male who presents with diabetic   ketoacidosis.  The patient has a history of pulmonary   hypertension with heart failure with preserved ejection fraction.    The patient has been seen recently in the cardiology clinic for   further evaluation of his hypertension, pulmonary hypertension.    Patient had medication changes on 5/18/2020 and is unable to   relate whether these were helpful to him.  Patient is not able to   verbalize a clear understanding of his medical conditions.  The   patient was not fully aware of the fact that he had pulmonary   hypertension or what pulmonary hypertension is.  The patient had   a prolonged visit with a very thoughtful cardiologist in   outpatient setting.  It is clear that a very thorough meeting was   undertaken given the complexity of the note by Dr. Bonilla.      The patient forgot to put insulin in his insulin pump and was   admitted with DKA.  He has had multiple recent encounters with a    healthcare system.  He was recently discharged for recurrent   pleural effusions and heart failure.    Currently the patient does not have any specific complaints other   than dyspnea with exertion.    Please see Dr. Bonilla's note on 5/18/2020 as it outlines his recent   medical care very nicely.    Currently the patient is being treated for DKA and is volume   overloaded.    Past Medical History   I have reviewed this patient's medical history and updated it   with pertinent information if needed.   Past Medical History:   Diagnosis Date     Anemia      CKD (chronic kidney disease) stage 3, GFR 30-59 ml/min (H)      Fall 11/2019    suffered multiple left rib fractures, C3 and T2 fractures     Paroxysmal atrial fibrillation (H)      Personal history of colonic polyps 1972    gets colonoscopy every five years, due in 2006     Pleural effusion on left 11/2019    after multiple rib fractures     Rosacea 1989     Type II or unspecified type diabetes mellitus without mention   of complication, not stated as uncontrolled 1999     Unspecified essential hypertension 1994       Past Surgical History   I have reviewed this patient's surgical history and updated it   with pertinent information if needed.  Past Surgical History:   Procedure Laterality Date     ANESTHESIA CARDIOVERSION N/A 2/3/2020    Procedure: ANESTHESIA, FOR CARDIOVERSION;  Surgeon: GENERIC   ANESTHESIA PROVIDER;  Location:  OR     CV RIGHT HEART CATH N/A 5/13/2020    Procedure: Right Heart Cath;  Surgeon: Senthil Silva MD;  Location:  HEART CARDIAC CATH LAB     HC REMOVE TONSILS/ADENOIDS,<13 Y/O      T & A <11y.o.     IR CHEST TUBE DRAIN TUNNELED LEFT  5/12/2020       Prior to Admission Medications   Prior to Admission Medications   Prescriptions Last Dose Informant Patient Reported? Taking?   INSULIN PUMP - OUTPATIENT  Self Yes Yes   Sig: Novolog Insulin  BASAL RATES and times:  Midnight to 7:00 am: 0.4 units/hr  7:00 am to 9:00 pm: 0.95  units/hour    9:00pm to midnight: 0.4 units/hr  CARB RATIO: 1 unit per 15 grams  Correction Factor (Sensitivity): 55mg/dL  BLOOD GLUCOSE TARGET and times:  12   AM (midnight): 100 - 140  5:30     AM:  100 - 120  10   PM:  100 - 140  Active Insulin Time:  5 hours   Bolus-Correction 1 unit(s) to lower blood glucose by 55 mg/dL  Checks insulin about 15 times a day manually  (Per Rx, pt uses 50-60 units/day)   LOVASTATIN 20 MG PO TABS 2020 at Unknown time Self No Yes   Si TABLET DAILY AT DINNER   acetaminophen (TYLENOL) 325 MG tablet Past Week at Unknown time   Self No Yes   Sig: Take 2 tablets (650 mg) by mouth every 4 hours as needed for   mild pain   apixaban ANTICOAGULANT (ELIQUIS) 5 MG tablet 2020 at still   on 2.5mg Self No Yes   Sig: Take 1/2 tablet (2.5mg) by mouth twice daily. You will have   your labs checked on 20 and your PCP will direct you when it   is safe to increase your dose back to 1 tablet (5mg) twice daily.     furosemide (LASIX) 20 MG tablet 2020 at Unknown time Self   Yes Yes   Sig: Take 20 mg by mouth daily   glucagon 1 MG kit  Self Yes No   Si mg as needed for low blood sugar   insulin aspart (NovoLOG) inject - to fill ambulatory pump  Self   No Yes   Sig: Use as directed   losartan-hydrochlorothiazide (HYZAAR) 100-25 MG tablet 2020   at Unknown time Self Yes Yes   Sig: Take 1 tablet by mouth daily   nystatin (MYCOSTATIN) 319832 UNIT/GM external cream  Self Yes Yes     Sig: Apply topically 2 times daily as needed    tiotropium (SPIRIVA) 18 MCG inhaled capsule 2020 at Unknown   time Self Yes Yes   Sig: Inhale 18 mcg into the lungs daily   torsemide (DEMADEX) 10 MG tablet  Self No No   Sig: Take 1 tablet (10 mg) by mouth every morning   verapamil ER (VERELAN) 240 MG 24 hr capsule 2020 at Unknown   time Self No Yes   Sig: Take 1 capsule (240 mg) by mouth At Bedtime      Facility-Administered Medications: None     Allergies   Allergies   Allergen Reactions      No Known Drug Allergies        Social History   I have reviewed this patient's social history and updated it with   pertinent information if needed. Tc Garcia  reports that he   quit smoking about 12 years ago. His smoking use included   cigarettes. He has a 8.00 pack-year smoking history. He has never   used smokeless tobacco. He reports previous alcohol use of about   35.0 standard drinks of alcohol per week. He reports previous   drug use.    Family History   I have reviewed this patient's family history and updated it with   pertinent information if needed.   Family History   Problem Relation Age of Onset     Family History Negative Mother          age 71     Family History Negative Father          age 70     Diabetes Brother         alive age 77     Diabetes Brother         alive age 76     Family History Negative Brother              Family History Negative Brother              Diabetes Sister         alive age74     Family History Negative Sister          age 86     Heart Disease Sister              Family History Negative Sister              Family History Negative Sister              Diabetes Sister      Diabetes Sister      Diabetes Brother      Diabetes Brother        Review of Systems   The 10 point Review of Systems is negative other than noted in   the HPI or here.     Physical Exam   Temp: 97.8  F (36.6  C) Temp src: Oral BP: 137/70 Pulse: 75 Heart   Rate: 76 Resp: 18 SpO2: 94 % O2 Device: None (Room air)    Vital Signs with Ranges  Temp:  [97.7  F (36.5  C)-98.2  F (36.8  C)] 97.8  F (36.6  C)  Pulse:  [68-92] 75  Heart Rate:  [68-93] 76  Resp:  [11-21] 18  BP: (119-155)/(70-94) 137/70  SpO2:  [94 %-100 %] 94 %  0 lbs 0 oz    GENERAL APPEARANCE: Alert and oriented x3. No acute distress.  SKIN: Inspection of the skin reveals no rashes, ulcerations,   warm, dry  NECK: Supple and symmetric.   Mildly elevated JVP  LUNGS: Clear breath  sounds throughout bilaterally, no wheezes, no   rhonchi  CARDIOVASCULAR: S1, S2, regular rate and rhythm without any   murmurs, gallops, rubs. The carotid pulses were normal and 2+   bilaterally without bruits. Peripheral pulses were 2+ and   symmetric.  ABDOMEN: Soft, non-tender, non-distended with normal bowel   sounds.  No ascites noted.  EXTREMITIES: Pitting 1-2+ to the knee edema.  NEUROLOGIC:  Normal mood and affect.  Sensation to touch was   normal.      Data   Results for orders placed or performed during the hospital   encounter of 05/21/20 (from the past 24 hour(s))   Glucose by meter   Result Value Ref Range    Glucose 269 (H) 70 - 99 mg/dL   Basic metabolic panel   Result Value Ref Range    Sodium 138 133 - 144 mmol/L    Potassium 3.9 3.4 - 5.3 mmol/L    Chloride 101 94 - 109 mmol/L    Carbon Dioxide 26 20 - 32 mmol/L    Anion Gap 11 3 - 14 mmol/L    Glucose 349 (H) 70 - 99 mg/dL    Urea Nitrogen 15 7 - 30 mg/dL    Creatinine 1.54 (H) 0.66 - 1.25 mg/dL    GFR Estimate 42 (L) >60 mL/min/[1.73_m2]    GFR Estimate If Black 48 (L) >60 mL/min/[1.73_m2]    Calcium 8.3 (L) 8.5 - 10.1 mg/dL   Ketone Beta-Hydroxybutyrate Quantitative   Result Value Ref Range    Ketone Quantitative 2.6 (HH) 0.0 - 0.6 mmol/L   Phosphorus   Result Value Ref Range    Phosphorus 2.9 2.5 - 4.5 mg/dL   CT Chest w/o Contrast    Narrative    CT CHEST W/O CONTRAST 5/22/2020 4:01 PM     HISTORY: Pneumonia, unresolved or complicated    TECHNIQUE: CT scan of the chest was performed without IV   contrast.  Multiplanar reformats were obtained. Dose reduction techniques   were  used.  CONTRAST: None.  COMPARISON: 5/11/2020    FINDINGS:     LUNGS AND PLEURA: Left pleural effusion has significantly   decreased in  size when compared to previous, status post Pleurx catheter   placement.  There is only a small residual amount of pleural fluid. There is  rounded atelectasis in the left lower lobe and scarring or   atelectasis  in the lingula. No  definite pneumonia. There is a small, freely  layering right pleural effusion, also decreased from previous.   Few  calcified pleural plaques noted. Previously seen groundglass  infiltrates in the right upper lobe have nearly completely   resolved,  with only minimal residual groundglass opacity in the periphery   of the  right apex on series 3 image 11.    MEDIASTINUM/AXILLAE: Calcified mediastinal and left hilar lymph   nodes  compatible with old granulomatous disease. Severe coronary artery  calcification. No lymphadenopathy.    UPPER ABDOMEN: No acute findings.    MUSCULOSKELETAL: Multiple old bilateral rib fractures again seen.   Some  of the left rib fractures are moderately displaced and ununited.   No  change from previous.      Impression    IMPRESSION:  1.  Left pleural effusion has nearly completely resolved status   post  placement of Pleurx catheter.  2.  Areas of rounded atelectasis and scarring in the left lung.   No  definite pneumonia.  3.  On the prior study there were extensive groundglass   infiltrates in  the right upper lobe. These have nearly completely resolved. A   small  right pleural effusion is smaller.    CEDRICK GERMAIN MD   Glucose by meter   Result Value Ref Range    Glucose 272 (H) 70 - 99 mg/dL   Basic metabolic panel   Result Value Ref Range    Sodium 138 133 - 144 mmol/L    Potassium 3.8 3.4 - 5.3 mmol/L    Chloride 102 94 - 109 mmol/L    Carbon Dioxide 26 20 - 32 mmol/L    Anion Gap 10 3 - 14 mmol/L    Glucose 218 (H) 70 - 99 mg/dL    Urea Nitrogen 16 7 - 30 mg/dL    Creatinine 1.58 (H) 0.66 - 1.25 mg/dL    GFR Estimate 40 (L) >60 mL/min/[1.73_m2]    GFR Estimate If Black 47 (L) >60 mL/min/[1.73_m2]    Calcium 8.2 (L) 8.5 - 10.1 mg/dL   Ketone Beta-Hydroxybutyrate Quantitative   Result Value Ref Range    Ketone Quantitative 0.3 0.0 - 0.6 mmol/L   Phosphorus   Result Value Ref Range    Phosphorus 2.5 2.5 - 4.5 mg/dL   Glucose by meter   Result Value Ref Range    Glucose 140 (H) 70  - 99 mg/dL   Basic metabolic panel   Result Value Ref Range    Sodium 138 133 - 144 mmol/L    Potassium 3.8 3.4 - 5.3 mmol/L    Chloride 104 94 - 109 mmol/L    Carbon Dioxide 29 20 - 32 mmol/L    Anion Gap 5 3 - 14 mmol/L    Glucose 84 70 - 99 mg/dL    Urea Nitrogen 15 7 - 30 mg/dL    Creatinine 1.49 (H) 0.66 - 1.25 mg/dL    GFR Estimate 43 (L) >60 mL/min/[1.73_m2]    GFR Estimate If Black 50 (L) >60 mL/min/[1.73_m2]    Calcium 7.9 (L) 8.5 - 10.1 mg/dL   Ketone Beta-Hydroxybutyrate Quantitative   Result Value Ref Range    Ketone Quantitative 0.1 0.0 - 0.6 mmol/L   Phosphorus   Result Value Ref Range    Phosphorus 2.4 (L) 2.5 - 4.5 mg/dL   Glucose by meter   Result Value Ref Range    Glucose 75 70 - 99 mg/dL   Glucose by meter   Result Value Ref Range    Glucose 62 (L) 70 - 99 mg/dL   Glucose by meter   Result Value Ref Range    Glucose 51 (L) 70 - 99 mg/dL   Glucose by meter   Result Value Ref Range    Glucose 97 70 - 99 mg/dL   Basic metabolic panel   Result Value Ref Range    Sodium 138 133 - 144 mmol/L    Potassium 3.9 3.4 - 5.3 mmol/L    Chloride 104 94 - 109 mmol/L    Carbon Dioxide 28 20 - 32 mmol/L    Anion Gap 6 3 - 14 mmol/L    Glucose 97 70 - 99 mg/dL    Urea Nitrogen 14 7 - 30 mg/dL    Creatinine 1.48 (H) 0.66 - 1.25 mg/dL    GFR Estimate 44 (L) >60 mL/min/[1.73_m2]    GFR Estimate If Black 51 (L) >60 mL/min/[1.73_m2]    Calcium 7.9 (L) 8.5 - 10.1 mg/dL   Ketone Beta-Hydroxybutyrate Quantitative   Result Value Ref Range    Ketone Quantitative 0.0 0.0 - 0.6 mmol/L   Phosphorus   Result Value Ref Range    Phosphorus 2.4 (L) 2.5 - 4.5 mg/dL   Glucose by meter   Result Value Ref Range    Glucose 147 (H) 70 - 99 mg/dL   Basic metabolic panel   Result Value Ref Range    Sodium 137 133 - 144 mmol/L    Potassium 4.0 3.4 - 5.3 mmol/L    Chloride 103 94 - 109 mmol/L    Carbon Dioxide 26 20 - 32 mmol/L    Anion Gap 8 3 - 14 mmol/L    Glucose 239 (H) 70 - 99 mg/dL    Urea Nitrogen 13 7 - 30 mg/dL    Creatinine 1.42  (H) 0.66 - 1.25 mg/dL    GFR Estimate 46 (L) >60 mL/min/[1.73_m2]    GFR Estimate If Black 53 (L) >60 mL/min/[1.73_m2]    Calcium 7.8 (L) 8.5 - 10.1 mg/dL   Ketone Beta-Hydroxybutyrate Quantitative   Result Value Ref Range    Ketone Quantitative 1.7 (HH) 0.0 - 0.6 mmol/L   Phosphorus   Result Value Ref Range    Phosphorus 2.4 (L) 2.5 - 4.5 mg/dL   CBC with platelets   Result Value Ref Range    WBC 12.0 (H) 4.0 - 11.0 10e9/L    RBC Count 3.08 (L) 4.4 - 5.9 10e12/L    Hemoglobin 8.4 (L) 13.3 - 17.7 g/dL    Hematocrit 26.3 (L) 40.0 - 53.0 %    MCV 85 78 - 100 fl    MCH 27.3 26.5 - 33.0 pg    MCHC 31.9 31.5 - 36.5 g/dL    RDW 18.7 (H) 10.0 - 15.0 %    Platelet Count 360 150 - 450 10e9/L   Glucose by meter   Result Value Ref Range    Glucose 288 (H) 70 - 99 mg/dL   Ketone Beta-Hydroxybutyrate Quantitative   Result Value Ref Range    Ketone Quantitative 1.1 (H) 0.0 - 0.6 mmol/L   Phosphorus   Result Value Ref Range    Phosphorus 2.2 (L) 2.5 - 4.5 mg/dL   Glucose by meter   Result Value Ref Range    Glucose 253 (H) 70 - 99 mg/dL             Ketone Beta-Hydroxybutyrate Quantitative   Result Value Ref Range    Ketone Quantitative 1.1 (H) 0.0 - 0.6 mmol/L   Phosphorus   Result Value Ref Range    Phosphorus 2.2 (L) 2.5 - 4.5 mg/dL   Glucose by meter   Result Value Ref Range    Glucose 253 (H) 70 - 99 mg/dL   Basic metabolic panel   Result Value Ref Range    Sodium 138 133 - 144 mmol/L    Potassium 3.7 3.4 - 5.3 mmol/L    Chloride 103 94 - 109 mmol/L    Carbon Dioxide 25 20 - 32 mmol/L    Anion Gap 10 3 - 14 mmol/L    Glucose 200 (H) 70 - 99 mg/dL    Urea Nitrogen 13 7 - 30 mg/dL    Creatinine 1.51 (H) 0.66 - 1.25 mg/dL    GFR Estimate 43 (L) >60 mL/min/[1.73_m2]    GFR Estimate If Black 49 (L) >60 mL/min/[1.73_m2]    Calcium 7.9 (L) 8.5 - 10.1 mg/dL   Phosphorus   Result Value Ref Range    Phosphorus 3.0 2.5 - 4.5 mg/dL   Glucose by meter   Result Value Ref Range    Glucose 153 (H) 70 - 99 mg/dL   Phosphorus   Result Value Ref  Range    Phosphorus 3.1 2.5 - 4.5 mg/dL   Glucose by meter   Result Value Ref Range    Glucose 58 (L) 70 - 99 mg/dL   Glucose by meter   Result Value Ref Range    Glucose 59 (L) 70 - 99 mg/dL   Glucose by meter   Result Value Ref Range    Glucose 103 (H) 70 - 99 mg/dL   Glucose by meter   Result Value Ref Range    Glucose 119 (H) 70 - 99 mg/dL   Phosphorus   Result Value Ref Range    Phosphorus 3.0 2.5 - 4.5 mg/dL   Basic metabolic panel   Result Value Ref Range    Sodium 135 133 - 144 mmol/L    Potassium 4.2 3.4 - 5.3 mmol/L    Chloride 102 94 - 109 mmol/L    Carbon Dioxide 26 20 - 32 mmol/L    Anion Gap 7 3 - 14 mmol/L    Glucose 163 (H) 70 - 99 mg/dL    Urea Nitrogen 13 7 - 30 mg/dL    Creatinine 1.42 (H) 0.66 - 1.25 mg/dL    GFR Estimate 46 (L) >60 mL/min/[1.73_m2]    GFR Estimate If Black 53 (L) >60 mL/min/[1.73_m2]    Calcium 8.2 (L) 8.5 - 10.1 mg/dL   Phosphorus   Result Value Ref Range    Phosphorus 2.3 (L) 2.5 - 4.5 mg/dL   Glucose by meter   Result Value Ref Range    Glucose 74 70 - 99 mg/dL   Phosphorus   Result Value Ref Range    Phosphorus 2.6 2.5 - 4.5 mg/dL   Basic metabolic panel   Result Value Ref Range    Sodium 135 133 - 144 mmol/L    Potassium 3.9 3.4 - 5.3 mmol/L    Chloride 103 94 - 109 mmol/L    Carbon Dioxide 26 20 - 32 mmol/L    Anion Gap 6 3 - 14 mmol/L    Glucose 198 (H) 70 - 99 mg/dL    Urea Nitrogen 12 7 - 30 mg/dL    Creatinine 1.35 (H) 0.66 - 1.25 mg/dL    GFR Estimate 49 (L) >60 mL/min/[1.73_m2]    GFR Estimate If Black 57 (L) >60 mL/min/[1.73_m2]    Calcium 8.0 (L) 8.5 - 10.1 mg/dL   CBC with platelets   Result Value Ref Range    WBC 12.3 (H) 4.0 - 11.0 10e9/L    RBC Count 3.24 (L) 4.4 - 5.9 10e12/L    Hemoglobin 9.0 (L) 13.3 - 17.7 g/dL    Hematocrit 27.5 (L) 40.0 - 53.0 %    MCV 85 78 - 100 fl    MCH 27.8 26.5 - 33.0 pg    MCHC 32.7 31.5 - 36.5 g/dL    RDW 18.2 (H) 10.0 - 15.0 %    Platelet Count 348 150 - 450 10e9/L

## 2020-05-24 NOTE — PROGRESS NOTES
[facility]    Hospitalist Progress Note    Assessment & Plan   Tc Garcia is a 80 year old male who was admitted on 5/21/2020.  Patient was recently discharged from [facility] following a stay from 5/10 to 5/14.  He is currently admitted for following DKA.  He had a left Pleurx catheter placed during his prior admission for recurrent pleural effusion  DKA due to lack of insulin his insulin pump  Some concern with cognition because upon restart of his pumon on 5/22, he did develop hyperglycemia followed by hypoglylcemia  Yet SLUMS screen was negative  Plan  - stop insulin pump due to persistent hypoglycemia. Start with 6 units of lantus and sliding scale  - needs endocrinology follow-up prior to restarting the pump, has difficulty with keeping his BS in check. He turns off the device frequently, forgets when it is on.     Shortness of breath multifactorial due to recurrent transudative pleural effusion s/p pleurx catheter placement, pulmonary hypertension, and HFpEF  Chest CT revealed no pneumonia, improvement in the pleural effusion with pleurx drainage  COVID negative  CT without pneumonia and abx stopped  Plan  - prn pleurx drainage  - starting torsemide for the PHTN  - hold losartan/hctz  - prn hydralazine    Chronic atrial fibrillation and flutter  - continue apixiban and verapamil    Hypokalemia  - monitor    DVT Prophylaxis: Patient is on Eliquis  Code Status: Full Code     Disposition: Expected discharge in 2-3 days to home once glucose is controlled on subcutaneous insulin    Left message with daughter    Yahir Wilhelm DO  Hospitalist [facility]  Pager: [phone]      Interval History   Patient stopped his pump last night because of hypoglycemia. Not entirely sure why, but the patient states he sometimes turns it off at home. He would like to try and get back on the pump rather than switch to subcutaneous      -Data reviewed today: I reviewed all new labs and imaging results over  the last 24 hours.  Physical Exam   Temp: 98.1  F (36.7  C) Temp src: Oral BP: (!) 152/86(Nurse inform) Pulse: 69 Heart Rate: 78 Resp: 16 SpO2: 95 % O2 Device: None (Room air)    There were no vitals filed for this visit.  Vital Signs with Ranges  Temp:  [97.1  F (36.2  C)-98.1  F (36.7  C)] 98.1  F (36.7  C)  Pulse:  [69] 69  Heart Rate:  [64-90] 78  Resp:  [16-25] 16  BP: (127-152)/(65-86) 152/86  SpO2:  [94 %-98 %] 95 %  I/O last 3 completed shifts:  In: 2319.17 [P.O.:1680; I.V.:639.17]  Out: -     Constitutional: Awake, alert, cooperative, no apparent distress, mild respiratory distress noted, he is very hard of hearing  Respiratory: Bilateral basilar crackles noted, breath sounds reduced in the bases, Pleurx catheter noted on left  Cardiovascular: Regular rate and rhythm, normal S1 and S2, and no murmur noted  GI: Normal bowel sounds, soft, non-distended, non-tender  Skin/Integumen: No rashes, no cyanosis, edema 2+ noted bilateral lower extremity  Neuro : moving all 4 extremities, no focal deficit noted     Medications       apixaban ANTICOAGULANT  2.5 mg Oral BID     furosemide  40 mg Intravenous Daily     insulin aspart  1-7 Units Subcutaneous TID AC     insulin aspart  1-5 Units Subcutaneous At Bedtime     insulin glargine  6 Units Subcutaneous QAM AC     polyethylene glycol  17 g Oral BID     pravastatin  20 mg Oral At Bedtime     senna-docusate  1 tablet Oral BID     sodium chloride (PF)  3 mL Intracatheter Q8H     umeclidinium  1 puff Inhalation Daily     verapamil ER  240 mg Oral At Bedtime       Data   Recent Labs   Lab 05/24/20  0627 05/23/20  2149 05/23/20  1420 05/23/20  0555  05/21/20  1916 05/21/20  1302   WBC 12.3*  --   --  12.0*  --  13.6* 15.7*   HGB 9.0*  --   --  8.4*  --  8.9* 9.6*   MCV 85  --   --  85  --  86 87     --   --  360  --  446 537*    135 138 137   < > 139 137   POTASSIUM 3.9 4.2 3.7 4.0   < > 3.4 4.5   CHLORIDE 103 102 103 103   < > 104 97   CO2 26 26 25 26   < >  24 20   BUN 12 13 13 13   < > 24 21   CR 1.35* 1.42* 1.51* 1.42*   < > 1.76* 1.81*   ANIONGAP 6 7 10 8   < > 11 20*   LLOYD 8.0* 8.2* 7.9* 7.8*   < > 8.4* 8.9   * 163* 200* 239*   < > 273* 530*   ALBUMIN  --   --   --   --   --  2.6*  --    PROTTOTAL  --   --   --   --   --  5.4*  --    BILITOTAL  --   --   --   --   --  0.6  --    ALKPHOS  --   --   --   --   --  107  --    ALT  --   --   --   --   --  24  --    AST  --   --   --   --   --  27  --    TROPI  --   --   --   --   --   --  0.034    < > = values in this interval not displayed.     Recent Labs   Lab 05/24/20  0627 05/24/20  0405 05/23/20  2149 05/23/20  2041 05/23/20 2000 05/23/20  1939/20  1921  05/23/20  1420  05/23/20  0555  05/23/20  0158   *  --  163*  --   --   --   --   --  200*  --  239*  --  97   BGM  --  74  --  119* 103* 59* 58*   < >  --    < >  --    < >  --     < > = values in this interval not displayed.       Imaging:   No results found for this or any previous visit (from the past 24 hour(s)).

## 2020-05-24 NOTE — PLAN OF CARE
AVSS ON RA. Denies pain. Hypoglycemic episode over night with no symptoms or immediately apparent precipitation; Patient adjusted insulin infusion as if at home. 3unit bolus per pt at 11pm. Ambulatory insulin pump off at 0300; frequent checks via pt own monitor. LS clear and equal. Diet Mod carb. BS active and audible. Voiding via urinal.

## 2020-05-24 NOTE — PLAN OF CARE
AVSS ON RA. Denies pain. Patient has discontinued insulin infusion and following the sliding scale insulin with pen. Dinner BS is 222 with 2 units for coverage. 6 units of Lantus given at 1300 today. LS clear and equal. Diet Mod carb. BS active and audible. Voiding via urinal, ambulating with SBA. Call light at hand no further needs noted.

## 2020-05-24 NOTE — PLAN OF CARE
Discharge Planner OT   Patient plan for discharge: home    Current status: Patient transferred supine-sit EOB SBA. sit-stand SBA with FWW. Patient ambulated in room SBA with FWW and into hallway ~ 120 feet. Patient returned to room and stood at sink to complete G/H task-brushing teeth independently. Patient transferred to standard toilet SBA. Patient returned to room and transferred to bed SBA. Patient reported mild SOB, resolved during rest.    Barriers to return to prior living situation: deconditioning, SOB with activity    Recommendations for discharge: home with increased assistance from wife for IADLs, home OT    Rationale for recommendations: Patient would benefit from OT evaluation to cont therapy for furthered independence in ADLs/IADLs. Patient needs increased assistance from family due to deconditioning.        Entered by: Kaya Laws 05/24/2020 12:18 PM

## 2020-05-25 ENCOUNTER — APPOINTMENT (OUTPATIENT)
Dept: PHYSICAL THERAPY | Facility: CLINIC | Age: 80
DRG: 637 | End: 2020-05-25
Payer: COMMERCIAL

## 2020-05-25 LAB
ANION GAP SERPL CALCULATED.3IONS-SCNC: 4 MMOL/L (ref 3–14)
BUN SERPL-MCNC: 9 MG/DL (ref 7–30)
CALCIUM SERPL-MCNC: 8 MG/DL (ref 8.5–10.1)
CHLORIDE SERPL-SCNC: 104 MMOL/L (ref 94–109)
CO2 SERPL-SCNC: 29 MMOL/L (ref 20–32)
CREAT SERPL-MCNC: 1.36 MG/DL (ref 0.66–1.25)
ERYTHROCYTE [DISTWIDTH] IN BLOOD BY AUTOMATED COUNT: 18 % (ref 10–15)
GFR SERPL CREATININE-BSD FRML MDRD: 48 ML/MIN/{1.73_M2}
GLUCOSE BLDC GLUCOMTR-MCNC: 128 MG/DL (ref 70–99)
GLUCOSE BLDC GLUCOMTR-MCNC: 325 MG/DL (ref 70–99)
GLUCOSE BLDC GLUCOMTR-MCNC: 85 MG/DL (ref 70–99)
GLUCOSE SERPL-MCNC: 163 MG/DL (ref 70–99)
HCT VFR BLD AUTO: 28.6 % (ref 40–53)
HGB BLD-MCNC: 8.9 G/DL (ref 13.3–17.7)
MCH RBC QN AUTO: 26.5 PG (ref 26.5–33)
MCHC RBC AUTO-ENTMCNC: 31.1 G/DL (ref 31.5–36.5)
MCV RBC AUTO: 85 FL (ref 78–100)
PHOSPHATE SERPL-MCNC: 3 MG/DL (ref 2.5–4.5)
PLATELET # BLD AUTO: 378 10E9/L (ref 150–450)
POTASSIUM SERPL-SCNC: 3.6 MMOL/L (ref 3.4–5.3)
RBC # BLD AUTO: 3.36 10E12/L (ref 4.4–5.9)
SODIUM SERPL-SCNC: 137 MMOL/L (ref 133–144)
WBC # BLD AUTO: 10.4 10E9/L (ref 4–11)

## 2020-05-25 PROCEDURE — 25000128 H RX IP 250 OP 636: Performed by: INTERNAL MEDICINE

## 2020-05-25 PROCEDURE — 25000131 ZZH RX MED GY IP 250 OP 636 PS 637: Performed by: HOSPITALIST

## 2020-05-25 PROCEDURE — 80048 BASIC METABOLIC PNL TOTAL CA: CPT | Performed by: HOSPITALIST

## 2020-05-25 PROCEDURE — 99232 SBSQ HOSP IP/OBS MODERATE 35: CPT | Performed by: HOSPITALIST

## 2020-05-25 PROCEDURE — 36415 COLL VENOUS BLD VENIPUNCTURE: CPT | Performed by: HOSPITALIST

## 2020-05-25 PROCEDURE — 00000146 ZZHCL STATISTIC GLUCOSE BY METER IP

## 2020-05-25 PROCEDURE — 97116 GAIT TRAINING THERAPY: CPT | Mod: GP

## 2020-05-25 PROCEDURE — 97110 THERAPEUTIC EXERCISES: CPT | Mod: GP

## 2020-05-25 PROCEDURE — 84100 ASSAY OF PHOSPHORUS: CPT | Performed by: HOSPITALIST

## 2020-05-25 PROCEDURE — 25000132 ZZH RX MED GY IP 250 OP 250 PS 637: Performed by: HOSPITALIST

## 2020-05-25 PROCEDURE — 99233 SBSQ HOSP IP/OBS HIGH 50: CPT | Performed by: INTERNAL MEDICINE

## 2020-05-25 PROCEDURE — 85027 COMPLETE CBC AUTOMATED: CPT | Performed by: HOSPITALIST

## 2020-05-25 PROCEDURE — 12000000 ZZH R&B MED SURG/OB

## 2020-05-25 RX ORDER — NICOTINE POLACRILEX 4 MG
15-30 LOZENGE BUCCAL
Status: DISCONTINUED | OUTPATIENT
Start: 2020-05-25 | End: 2020-05-29 | Stop reason: HOSPADM

## 2020-05-25 RX ORDER — DEXTROSE MONOHYDRATE 25 G/50ML
25-50 INJECTION, SOLUTION INTRAVENOUS
Status: DISCONTINUED | OUTPATIENT
Start: 2020-05-25 | End: 2020-05-29 | Stop reason: HOSPADM

## 2020-05-25 RX ADMIN — INSULIN GLARGINE 8 UNITS: 100 INJECTION, SOLUTION SUBCUTANEOUS at 08:27

## 2020-05-25 RX ADMIN — APIXABAN 2.5 MG: 2.5 TABLET, FILM COATED ORAL at 08:14

## 2020-05-25 RX ADMIN — VERAPAMIL HYDROCHLORIDE 240 MG: 240 TABLET, FILM COATED, EXTENDED RELEASE ORAL at 21:21

## 2020-05-25 RX ADMIN — PRAVASTATIN SODIUM 20 MG: 20 TABLET ORAL at 21:21

## 2020-05-25 RX ADMIN — UMECLIDINIUM 1 PUFF: 62.5 AEROSOL, POWDER ORAL at 08:15

## 2020-05-25 RX ADMIN — APIXABAN 2.5 MG: 2.5 TABLET, FILM COATED ORAL at 20:23

## 2020-05-25 RX ADMIN — POLYETHYLENE GLYCOL 3350 17 G: 17 POWDER, FOR SOLUTION ORAL at 20:23

## 2020-05-25 RX ADMIN — FUROSEMIDE 40 MG: 10 INJECTION, SOLUTION INTRAMUSCULAR; INTRAVENOUS at 08:19

## 2020-05-25 ASSESSMENT — ACTIVITIES OF DAILY LIVING (ADL)
ADLS_ACUITY_SCORE: 15

## 2020-05-25 NOTE — CONSULTS
Care Transition Initial Assessment - RN        Met with: Patient.  DATA   Active Problems:    DKA (diabetic ketoacidoses) (H)       Cognitive Status: awake and alert.        Contact information and PCP information verified: Yes  Lives With: spouse   Living Arrangements: house     Insurance concerns: No Insurance issues identified  ASSESSMENT  Patient currently receives the following services:  Pt is currently open to [facility] RN/OT (Pending PT eval)        Identified issues/concerns regarding health management: Met with pt regarding discharge planning. Pt has been admitted for the 3rd time in 1 month. He started on home care recently to assist with his pleurex drain and to start therapies.  Prior to admission he was using an insulin pump.  During this stay it has be determined to stop the insulin pump and use lantus and sliding scale.  The pt's PCP has been dosing his insulin pump however he has been dosing the pump not knowing the pt is shutting the pump off for periods of times during the day and night. Discussed with the pt the need for a Endocrinologist after discharged. The pt wants to return to his insulin pump. Pt would have higher chance of returning to his pump if he follows with an endocrine provider.  He would like to establish with [facility] off [facility] prior to discharge and prefers mid day appointments.    PLAN  Financial costs for the patient include copays, insulin.  Patient given options and choices for discharge yes.  Patient/family is agreeable to the plan?  Yes  Patient anticipates discharging to Home with resumption of home care.        Patient anticipates needs for home equipment: No  Transportation/person available to transport on day of discharge  is TBD and have they been notified/set up TBD  Plan/Disposition: Home   Appointments:   TBD-Will need PCP and new Endocrine appointment prior to discharge.     Care  (CTS) will continue to follow as  needed.      Maile Owen RN BAN  Inpatient Care Coordination  [address]  [place] [address]  [email or web address]   Office: [phone]  Fax: [phone]

## 2020-05-25 NOTE — PLAN OF CARE
A/O x4. VSS on RA. Up SBA. Tolerating mod carb diet. Lung sounds clear. Bowel sounds active. Passing flatus. Several BM's yesterday. Adequate urine output. Legs in ace wraps. Mepilex to coccyx. Denies pain and nausea. Tele NSR. BG's: 109, 128.

## 2020-05-25 NOTE — PLAN OF CARE
AVSS ON RA. Denies pain. Pt following sliding scale insulin with pen, carb count coverage and scheduled Lantus. BS checked with hospital glucometer at 324 and pt glucometer reading was 325.  LS clear and equal. Diet Mod carb. BS active and audible. Ambulating independently. Call light at hand no further needs noted. Daily weight has been ordered.

## 2020-05-25 NOTE — PROGRESS NOTES
[facility]    Hospitalist Progress Note    Assessment & Plan   Tc Garcia is a 80 year old male who was admitted on 5/21/2020.  Patient was recently discharged from [facility] following a stay from 5/10 to 5/14.  He is currently admitted for following DKA.  He had a left Pleurx catheter placed during his prior admission for recurrent pleural effusion  DKA due to lack of insulin his insulin pump  Some concern with cognition because upon restart of his pumon on 5/22, he did develop hyperglycemia followed by hypoglylcemia  Yet SLUMS screen was negative  Plan  - lantus 8 units  - 1:15 units per carb premeal  - moderate sliding scale  - needs endocrinology follow-up prior to restarting the pump, has difficulty with keeping his BS in check. He turns off the device frequently, forgets when it is on, etc    Shortness of breath multifactorial due to recurrent transudative pleural effusion s/p pleurx catheter placement, pulmonary hypertension, and HFpEF  Chest CT revealed no pneumonia, improvement in the pleural effusion with pleurx drainage  COVID negative  CT without pneumonia and abx stopped  Plan  - prn pleurx drainage  - starting torsemide  - hold losartan/hctz  - prn hydralazine  - large disconnect between what medications the cardiologist are titrating and what medications the patient is truly taking. So he needs a home RN at home to help. Of note, his SLUMS was normal.    Chronic atrial fibrillation and flutter  - continue apixiban and verapamil    Hypokalemia  - monitor    DVT Prophylaxis: Patient is on Eliquis  Code Status: Full Code     Disposition: Expected discharge in 2-3 days to home once his is off the IV diuretic      Yahir Wilhelm DO  Hospitalist [facility]  Pager: [phone]      Interval History   More hyperglycemic today, notes his eating is fantastic. No nausea or vomiting. Less shortness of breath      -Data reviewed today: I reviewed all new labs and imaging results over the  last 24 hours.  Physical Exam   Temp: 98.4  F (36.9  C) Temp src: Oral BP: (!) 146/76   Heart Rate: 76 Resp: 18 SpO2: 94 % O2 Device: None (Room air)    There were no vitals filed for this visit.  Vital Signs with Ranges  Temp:  [98.1  F (36.7  C)-98.7  F (37.1  C)] 98.4  F (36.9  C)  Heart Rate:  [71-80] 76  Resp:  [16-18] 18  BP: (120-146)/(66-84) 146/76  SpO2:  [90 %-95 %] 94 %  I/O last 3 completed shifts:  In: 1080 [P.O.:1080]  Out: 300 [Urine:300]    Constitutional: Awake, alert, cooperative, no apparent distress, mild respiratory distress noted, he is very hard of hearing  Respiratory: Bilateral basilar crackles noted, breath sounds reduced in the bases, Pleurx catheter noted on left  Cardiovascular: Regular rate and rhythm, normal S1 and S2, and no murmur noted  GI: Normal bowel sounds, soft, non-distended, non-tender  Skin/Integumen: No rashes, no cyanosis, edema 2+ noted bilateral lower extremity  Neuro : moving all 4 extremities, no focal deficit noted     Medications       apixaban ANTICOAGULANT  2.5 mg Oral BID     furosemide  40 mg Intravenous Daily     [START ON 5/26/2020] insulin aspart   Subcutaneous QAM AC     insulin aspart   Subcutaneous Daily with lunch     insulin aspart   Subcutaneous Daily with supper     insulin aspart  1-7 Units Subcutaneous TID AC     insulin aspart  1-5 Units Subcutaneous At Bedtime     insulin glargine  8 Units Subcutaneous QAM AC     polyethylene glycol  17 g Oral BID     pravastatin  20 mg Oral At Bedtime     senna-docusate  1 tablet Oral BID     sodium chloride (PF)  3 mL Intracatheter Q8H     umeclidinium  1 puff Inhalation Daily     verapamil ER  240 mg Oral At Bedtime       Data   Recent Labs   Lab 05/25/20  0707 05/24/20  0627 05/23/20  2149  05/23/20  0555  05/21/20  1916 05/21/20  1302   WBC 10.4 12.3*  --   --  12.0*  --  13.6* 15.7*   HGB 8.9* 9.0*  --   --  8.4*  --  8.9* 9.6*   MCV 85 85  --   --  85  --  86 87    348  --   --  360  --  446 537*   NA  137 135 135   < > 137   < > 139 137   POTASSIUM 3.6 3.9 4.2   < > 4.0   < > 3.4 4.5   CHLORIDE 104 103 102   < > 103   < > 104 97   CO2 29 26 26   < > 26   < > 24 20   BUN 9 12 13   < > 13   < > 24 21   CR 1.36* 1.35* 1.42*   < > 1.42*   < > 1.76* 1.81*   ANIONGAP 4 6 7   < > 8   < > 11 20*   LLOYD 8.0* 8.0* 8.2*   < > 7.8*   < > 8.4* 8.9   * 198* 163*   < > 239*   < > 273* 530*   ALBUMIN  --   --   --   --   --   --  2.6*  --    PROTTOTAL  --   --   --   --   --   --  5.4*  --    BILITOTAL  --   --   --   --   --   --  0.6  --    ALKPHOS  --   --   --   --   --   --  107  --    ALT  --   --   --   --   --   --  24  --    AST  --   --   --   --   --   --  27  --    TROPI  --   --   --   --   --   --   --  0.034    < > = values in this interval not displayed.     Recent Labs   Lab 05/25/20  1214 05/25/20  0707 05/25/20  0145 05/24/20  0627 05/24/20  0405 05/23/20  2149 05/23/20  2041 05/23/20 2000 05/23/20  1420  05/23/20  0555   GLC  --  163*  --  198*  --  163*  --   --   --  200*  --  239*   *  --  128*  --  74  --  119* 103*   < >  --    < >  --     < > = values in this interval not displayed.       Imaging:   No results found for this or any previous visit (from the past 24 hour(s)).

## 2020-05-25 NOTE — PROGRESS NOTES
"[facility]    Cardiology Consultation     Date of Admission:  5/21/2020    Assessment & Plan   Tc Garcia is a 80 year old male who was admitted on 5/21/2020. I was asked to see the patient for diabetic ketoacidosis.  Cardiology was consulted to comment on his pulmonary hypertension.    The patient has a very limited understanding of his medical comorbidities.  When asked if he had recently seen the cardiologist he says \"maybe\" he did not recall the specifics in regards to his recent outpatient cardiology consult.  He was also not able to identify that he had pulmonary hypertension.  He did know that he was on Viagra at some point.    The patient was admitted because he forgot to put a insulin refill into his insulin pump.  Dr. Bonilla has a wonderfully outlined plan in the most recent cardiology outpatient note.  Dr. Bonilla has also highlighted the fact that the patient would benefit from face-to-face visits.  In my opinion this is complicated by his limited capacity to navigate his medical comorbidities.    Currently the patient is volume overloaded and is not being treated with sildenafil due to the fact that he did not achieve normal blood pressures and volume status prior to  being admitted to the hospital.  The patient stated that he made it only a few days out of the hospital before he got worse again.    I am concerned that a complex medical regimen may not be achievable for this patient in his current capacity.     We will work to optimize his volume status, restore electrolyte balance and optimize him to reinitiate therapy with sildenafil for his pulmonary hypertension.    However the bigger question is whether he will to be able to successfully navigate his health care independently.    1.  Pulmonary hypertension out of proportion to degree of left heart failure.   2.  Heart failure with preserved ejection fraction, LVEF 55%-60%, NYHA class III.   3.  Recurrent left pleural effusion with " PleurX in place following traumatic fall and hemothorax in 10/2019.   4.  Stage III chronic kidney disease.   5.  Type 2 diabetes mellitus, on long-term insulin therapy.   6.  Uncontrolled hypertension.   7.  Chronic atrial fibrillation and flutter.  Rate controlled with verapamil 240 mg daily and low-dose Eliquis 2.5 mg 2 times daily.   8.  Chronic anemia.   9.  Elderly frailty.     Plan:  Will start diuresis today (lasix 40mg IV daily).  Please obtain daily weights (I do not see a charted weight). Will attempt diuresis to 150 lbs.     Patient continues to have issues with insulin pump and was changed to SubQ insulin yesterday.        Advised nursing that the patient wrap his legs with Ace wraps to mobilize fluid as there is significant volume overload in the legs.    Currently, patient is on verapamil 240mg has a reasonable blood pressure.  Patient's outpatient cardiologist outlined plans of losartan and clonidine in addition due to patients hypertension.  Given the patients acceptable blood pressure on less medications, I am worried the patient may not be compliant with his medications and this will make titrations as an outpatient difficult.     Once the patient achieves normal electrolyte status, is out of DKA completely, achieves euvolemia, is normotensive, and is stable on oral diuretic dose reinitiation of sildenafil at 20 mg daily could be considered.    Manny Cleveland MD     Physical Exam   Temp: 98.4  F (36.9  C) Temp src: Oral BP: (!) 146/76   Heart Rate: 76 Resp: 18 SpO2: 94 % O2 Device: None (Room air)    Vital Signs with Ranges  Temp:  [98.1  F (36.7  C)-98.7  F (37.1  C)] 98.4  F (36.9  C)  Heart Rate:  [71-80] 76  Resp:  [16-18] 18  BP: (120-146)/(66-84) 146/76  SpO2:  [90 %-95 %] 94 %  0 lbs 0 oz    GENERAL APPEARANCE: Alert and oriented x3. No acute distress.  SKIN: Inspection of the skin reveals no rashes, ulcerations, warm, dry  NECK: Supple and symmetric.   Mildly elevated JVP  LUNGS:  Clear breath sounds throughout bilaterally, no wheezes, no rhonchi  CARDIOVASCULAR: S1, S2, regular rate and rhythm without any murmurs, gallops, rubs. The carotid pulses were normal and 2+ bilaterally without bruits. Peripheral pulses were 2+ and symmetric.  ABDOMEN: Soft, non-tender, non-distended with normal bowel sounds.  No ascites noted.  EXTREMITIES: (legs wrapped today) Pitting 1-2+ to the knee edema.  NEUROLOGIC:  Normal mood and affect.  Sensation to touch was normal.      Data   Results for orders placed or performed during the hospital encounter of 05/21/20 (from the past 24 hour(s))   Glucose by meter   Result Value Ref Range    Glucose 128 (H) 70 - 99 mg/dL   Basic metabolic panel   Result Value Ref Range    Sodium 137 133 - 144 mmol/L    Potassium 3.6 3.4 - 5.3 mmol/L    Chloride 104 94 - 109 mmol/L    Carbon Dioxide 29 20 - 32 mmol/L    Anion Gap 4 3 - 14 mmol/L    Glucose 163 (H) 70 - 99 mg/dL    Urea Nitrogen 9 7 - 30 mg/dL    Creatinine 1.36 (H) 0.66 - 1.25 mg/dL    GFR Estimate 48 (L) >60 mL/min/[1.73_m2]    GFR Estimate If Black 56 (L) >60 mL/min/[1.73_m2]    Calcium 8.0 (L) 8.5 - 10.1 mg/dL   Phosphorus   Result Value Ref Range    Phosphorus 3.0 2.5 - 4.5 mg/dL   CBC with platelets   Result Value Ref Range    WBC 10.4 4.0 - 11.0 10e9/L    RBC Count 3.36 (L) 4.4 - 5.9 10e12/L    Hemoglobin 8.9 (L) 13.3 - 17.7 g/dL    Hematocrit 28.6 (L) 40.0 - 53.0 %    MCV 85 78 - 100 fl    MCH 26.5 26.5 - 33.0 pg    MCHC 31.1 (L) 31.5 - 36.5 g/dL    RDW 18.0 (H) 10.0 - 15.0 %    Platelet Count 378 150 - 450 10e9/L

## 2020-05-25 NOTE — PLAN OF CARE
Discharge Planner PT   Patient plan for discharge: Home  Current status: Pt independent with bed mobility and transfers. Pt ambulates 200' x 2 with FWW modified independent without LOB. Pt up/down 12 steps with handrail SBA. Pt independent with standing HEP.  Barriers to return to prior living situation: None  Recommendations for discharge: Home with home PT  Rationale for recommendations: Pt will benefit from home PT services to continue to improve general strength and functional activity tolerance for optimal functional mobility.        Entered by: Sofía De Anda 05/25/2020 8:35 AM      Physical Therapy Discharge Summary    Reason for therapy discharge:    Discharged to home with home therapy.    Progress towards therapy goal(s). See goals on Care Plan in [facility] electronic health record for goal details.  Goals met    Therapy recommendation(s):    Continued therapy is recommended.  Rationale/Recommendations:  To further increase strength and independence.

## 2020-05-25 NOTE — PLAN OF CARE
3p-7p: VSS. Denies pain. Up independently in room. Blood sugar 151 before supper, sliding scale and carb coverage given. Pleurx drain CDI. Voiding adequately. LS diminished. Possible discharge tomorrow. CTM

## 2020-05-26 ENCOUNTER — APPOINTMENT (OUTPATIENT)
Dept: OCCUPATIONAL THERAPY | Facility: CLINIC | Age: 80
DRG: 637 | End: 2020-05-26
Payer: COMMERCIAL

## 2020-05-26 LAB
ANION GAP SERPL CALCULATED.3IONS-SCNC: 7 MMOL/L (ref 3–14)
BUN SERPL-MCNC: 13 MG/DL (ref 7–30)
CALCIUM SERPL-MCNC: 8 MG/DL (ref 8.5–10.1)
CHLORIDE SERPL-SCNC: 101 MMOL/L (ref 94–109)
CO2 SERPL-SCNC: 26 MMOL/L (ref 20–32)
CREAT SERPL-MCNC: 1.35 MG/DL (ref 0.66–1.25)
GFR SERPL CREATININE-BSD FRML MDRD: 49 ML/MIN/{1.73_M2}
GLUCOSE BLDC GLUCOMTR-MCNC: 138 MG/DL (ref 70–99)
GLUCOSE BLDC GLUCOMTR-MCNC: 179 MG/DL (ref 70–99)
GLUCOSE BLDC GLUCOMTR-MCNC: 256 MG/DL (ref 70–99)
GLUCOSE BLDC GLUCOMTR-MCNC: 311 MG/DL (ref 70–99)
GLUCOSE SERPL-MCNC: 331 MG/DL (ref 70–99)
POTASSIUM SERPL-SCNC: 3.7 MMOL/L (ref 3.4–5.3)
SODIUM SERPL-SCNC: 134 MMOL/L (ref 133–144)

## 2020-05-26 PROCEDURE — 25000128 H RX IP 250 OP 636: Performed by: INTERNAL MEDICINE

## 2020-05-26 PROCEDURE — 93005 ELECTROCARDIOGRAM TRACING: CPT

## 2020-05-26 PROCEDURE — 99232 SBSQ HOSP IP/OBS MODERATE 35: CPT | Performed by: HOSPITALIST

## 2020-05-26 PROCEDURE — 00000146 ZZHCL STATISTIC GLUCOSE BY METER IP

## 2020-05-26 PROCEDURE — 97535 SELF CARE MNGMENT TRAINING: CPT | Mod: GO | Performed by: OCCUPATIONAL THERAPY ASSISTANT

## 2020-05-26 PROCEDURE — 12000000 ZZH R&B MED SURG/OB

## 2020-05-26 PROCEDURE — 25000131 ZZH RX MED GY IP 250 OP 636 PS 637: Performed by: HOSPITALIST

## 2020-05-26 PROCEDURE — 80048 BASIC METABOLIC PNL TOTAL CA: CPT | Performed by: HOSPITALIST

## 2020-05-26 PROCEDURE — 93010 ELECTROCARDIOGRAM REPORT: CPT | Performed by: INTERNAL MEDICINE

## 2020-05-26 PROCEDURE — 36415 COLL VENOUS BLD VENIPUNCTURE: CPT | Performed by: HOSPITALIST

## 2020-05-26 PROCEDURE — 99233 SBSQ HOSP IP/OBS HIGH 50: CPT | Performed by: INTERNAL MEDICINE

## 2020-05-26 PROCEDURE — 25000132 ZZH RX MED GY IP 250 OP 250 PS 637: Performed by: HOSPITALIST

## 2020-05-26 RX ORDER — FUROSEMIDE 10 MG/ML
40 INJECTION INTRAMUSCULAR; INTRAVENOUS EVERY 12 HOURS
Status: DISCONTINUED | OUTPATIENT
Start: 2020-05-26 | End: 2020-05-29

## 2020-05-26 RX ADMIN — APIXABAN 2.5 MG: 2.5 TABLET, FILM COATED ORAL at 19:51

## 2020-05-26 RX ADMIN — UMECLIDINIUM 1 PUFF: 62.5 AEROSOL, POWDER ORAL at 08:01

## 2020-05-26 RX ADMIN — APIXABAN 2.5 MG: 2.5 TABLET, FILM COATED ORAL at 08:00

## 2020-05-26 RX ADMIN — INSULIN GLARGINE 8 UNITS: 100 INJECTION, SOLUTION SUBCUTANEOUS at 08:00

## 2020-05-26 RX ADMIN — FUROSEMIDE 40 MG: 10 INJECTION, SOLUTION INTRAMUSCULAR; INTRAVENOUS at 09:04

## 2020-05-26 RX ADMIN — FUROSEMIDE 40 MG: 10 INJECTION, SOLUTION INTRAMUSCULAR; INTRAVENOUS at 19:51

## 2020-05-26 RX ADMIN — PRAVASTATIN SODIUM 20 MG: 20 TABLET ORAL at 21:41

## 2020-05-26 RX ADMIN — VERAPAMIL HYDROCHLORIDE 240 MG: 240 TABLET, FILM COATED, EXTENDED RELEASE ORAL at 21:41

## 2020-05-26 RX ADMIN — POLYETHYLENE GLYCOL 3350 17 G: 17 POWDER, FOR SOLUTION ORAL at 08:00

## 2020-05-26 ASSESSMENT — ACTIVITIES OF DAILY LIVING (ADL)
ADLS_ACUITY_SCORE: 13
ADLS_ACUITY_SCORE: 15

## 2020-05-26 NOTE — PLAN OF CARE
A&Ox4. VSS ex. HTN within parameters. Denies pain. Up independently in room. Mod Carb diet.  B.  Pleurx drain dressing CDI. Voiding in BR. LS diminished.  Neuros intact. Tele: SR with 1st degree AV block.Continue to monitor.

## 2020-05-26 NOTE — PLAN OF CARE
Pt alert and oriented. Vitally stable on room air. Denies pain. Voiding adequately. Passing gas. Saline locked. Tolerating mod carb diet. NSR on tele. BS 200s-300s. Up independent. Plurex drain dressing changed (lung drained today at bedside 700ml out). Passing gas. Lasix IV given today. Diminished lung sounds. Waiting for pt to be off IV lasix before discharging.

## 2020-05-26 NOTE — PROGRESS NOTES
[facility]  Cardiology Progress Note    Date of Service (when I saw the patient): 05/26/2020  Primary Cardiologist: Dr. Julien/Dr. Camejo/Dr. Bonilla (in [facility] clinic)       Interval History:   He has no nausea. He was able to have his breakfast this morning. He has no new concerns or questions.     ----------------------------------------------------------------------------------------    Assessment:  Tc Garcia is a 80 year old male who was admitted on 5/21/2020 with DKA. Cardiology consulted due to heart failure in the setting of complex cardiac history.     Acute on Chronic HFpEF   -- dry weight is ~150-155 Ibs   -- BNP on admission 5,500 (7,000 previously)  -- pta diuretic regimen furosemide 20 mg daily  -- currently receiving IV lasix 40 mg daily; wt today 160 Ibs I/O: net positive 8 L since admission    Pulmonary Hypertension   -- recently established with Dr. Bonilla; plan was to initiate sildenafil soon as outpatient   -- RHC on 5/13 with moderate PH with normal wedge pressure and low normal cardiac index; markedly positive vasodilatory study (NO)  -- he was also enrolled in pulm rehab    DKA   -- in the setting of forgetting to refill his insulin     Hx of Recurrent left pleural effusion  -- has PleurX in place following traumatic fall and hemothorax in 10/2019.     Stage III CKD  -- Cr today 1.3 which is his baseline    Type 2 diabetes mellitus, on long-term insulin therapy    Hypertension  -- pta hyzaar; verapamil, and clonidine (?); plan was to decrease clonidine and increase losartan  -- controlled this AM but was high last night (5/25)- he is only on diltiazem this admission    Chronic atrial fibrillation and flutter  -- Rate controlled with verapamil 240 mg daily  -- low-dose Eliquis 2.5 mg 2 times daily for CVA prophylaxis  -- he was on amio before but was discontinued due to prolonged QTc (580 ms on 5/14; no repeat ECG since then)    Chronic anemia  -- Hgb around  9    ----------------------------------------------------------------------------------------    Plan:  -- Repeat ECG today   -- Continue with IV diuresis   -- Resume losartan 25 mg     ----------------------------------------------------------------------------------------  Physical Exam   Temp: 98.1  F (36.7  C) Temp src: Oral BP: 132/74   Heart Rate: 75 Resp: 18 SpO2: 97 % O2 Device: None (Room air)    Vitals:    05/26/20 0621   Weight: 72.6 kg (160 lb 0.9 oz)       GEN:  NAD. Frail. Oxygen on room air.   HEENT: Mucous membranes moist.  NECK:  Elevated JVD.  C/V:  Regular rate and rhythm, no murmur, rub or gallop.   RESP: Fine crackles throughout.   GI: Abdomen soft, nontender, nondistended.    EXTREM: 2+ pitting LE edema just above knees   NEURO: Alert and oriented, cooperative.   PSYCH: Normal affect.  SKIN: Warm and dry.   VASC: 2+ radial and dorsalis pedis pulses bilaterally.      Medications       apixaban ANTICOAGULANT  2.5 mg Oral BID     furosemide  40 mg Intravenous Daily     insulin aspart   Subcutaneous QAM AC     insulin aspart   Subcutaneous Daily with lunch     insulin aspart   Subcutaneous Daily with supper     insulin aspart  1-7 Units Subcutaneous TID AC     insulin aspart  1-5 Units Subcutaneous At Bedtime     insulin glargine  8 Units Subcutaneous QAM AC     polyethylene glycol  17 g Oral BID     pravastatin  20 mg Oral At Bedtime     senna-docusate  1 tablet Oral BID     sodium chloride (PF)  3 mL Intracatheter Q8H     umeclidinium  1 puff Inhalation Daily     verapamil ER  240 mg Oral At Bedtime       Data   Reviewed.     Tele: A fib CVR      Tori Auguste PA-C   5/26/2020  Pager: [phone]

## 2020-05-26 NOTE — PROGRESS NOTES
[facility]    Hospitalist Progress Note    Assessment & Plan   Tc Garcia is a 80 year old male who was admitted on 5/21/2020.  Patient was recently discharged from [facility] following a stay from 5/10 to 5/14.  He is currently admitted for following DKA.  He had a left Pleurx catheter placed during his prior admission for recurrent pleural effusion  DKA due to lack of insulin his insulin pump  Some concern with cognition because upon restart of his pumon on 5/22, he did develop hyperglycemia followed by hypoglylcemia  Yet SLUMS screen was negative  Plan  - lantus 8 units  - 1:12 units per carb premeal  - moderate sliding scale  - needs endocrinology follow-up prior to restarting the pump, has difficulty with keeping his BS in check. He turns off the device frequently, forgets when it is on, etc    Shortness of breath multifactorial due to recurrent transudative pleural effusion s/p pleurx catheter placement, pulmonary hypertension, and HFpEF  Chest CT revealed no pneumonia, improvement in the pleural effusion with pleurx drainage  COVID negative  CT without pneumonia and abx stopped  Plan  - prn pleurx drainage  - coninue IV lasix  - hold losartan/hctz  - prn hydralazine  - large disconnect between what medications the cardiologist are titrating and what medications the patient is truly taking. So he needs a home RN at home to help with med management. Of note, his SLUMS was normal. I wonder if the home hypoglycemia is leading to intermittent confusion, difficulty with med management, etc    Chronic atrial fibrillation and flutter  - continue apixiban and verapamil    Hypokalemia  - monitor    DVT Prophylaxis: Patient is on Eliquis  Code Status: Full Code     Disposition: Expected discharge in 2-3 days to home once his is off the IV diuretic      Yahir Wilhelm DO  Hospitalist [facility]  Pager: [phone]      Interval History   Eating better, felt worse this am, but now better aftter the  catheter is drained      -Data reviewed today: I reviewed all new labs and imaging results over the last 24 hours.  Physical Exam   Temp: 98  F (36.7  C) Temp src: Oral BP: 118/63   Heart Rate: 73 Resp: 18 SpO2: 97 % O2 Device: None (Room air)    Vitals:    05/26/20 0621 05/26/20 1456   Weight: 72.6 kg (160 lb 0.9 oz) 71.7 kg (158 lb 1.6 oz)     Vital Signs with Ranges  Temp:  [98  F (36.7  C)-98.5  F (36.9  C)] 98  F (36.7  C)  Heart Rate:  [65-85] 73  Resp:  [18] 18  BP: (118-149)/(63-93) 118/63  SpO2:  [90 %-97 %] 97 %  I/O last 3 completed shifts:  In: 640 [P.O.:640]  Out: 700 [Chest Tube:700]    Constitutional: Awake, alert, cooperative, no apparent distress, mild respiratory distress noted, he is very hard of hearing  Respiratory: Bilateral basilar crackles noted, breath sounds reduced in the bases, Pleurx catheter noted on left  Cardiovascular: Regular rate and rhythm, normal S1 and S2, and no murmur noted  GI: Normal bowel sounds, soft, non-distended, non-tender  Skin/Integumen: No rashes, no cyanosis, edema 2+ noted bilateral lower extremity  Neuro : moving all 4 extremities, no focal deficit noted     Medications       apixaban ANTICOAGULANT  2.5 mg Oral BID     furosemide  40 mg Intravenous Q12H     insulin aspart  1-7 Units Subcutaneous TID AC     insulin aspart  1-5 Units Subcutaneous At Bedtime     insulin aspart   Subcutaneous QAM AC     insulin aspart   Subcutaneous Daily with lunch     insulin aspart   Subcutaneous Daily with supper     insulin glargine  8 Units Subcutaneous QAM AC     polyethylene glycol  17 g Oral BID     pravastatin  20 mg Oral At Bedtime     senna-docusate  1 tablet Oral BID     sodium chloride (PF)  3 mL Intracatheter Q8H     umeclidinium  1 puff Inhalation Daily     verapamil ER  240 mg Oral At Bedtime       Data   Recent Labs   Lab 05/26/20  0857 05/25/20  0707 05/24/20  0627  05/23/20  0555  05/21/20  1916 05/21/20  1302   WBC  --  10.4 12.3*  --  12.0*  --  13.6* 15.7*    HGB  --  8.9* 9.0*  --  8.4*  --  8.9* 9.6*   MCV  --  85 85  --  85  --  86 87   PLT  --  378 348  --  360  --  446 537*    137 135   < > 137   < > 139 137   POTASSIUM 3.7 3.6 3.9   < > 4.0   < > 3.4 4.5   CHLORIDE 101 104 103   < > 103   < > 104 97   CO2 26 29 26   < > 26   < > 24 20   BUN 13 9 12   < > 13   < > 24 21   CR 1.35* 1.36* 1.35*   < > 1.42*   < > 1.76* 1.81*   ANIONGAP 7 4 6   < > 8   < > 11 20*   LLOYD 8.0* 8.0* 8.0*   < > 7.8*   < > 8.4* 8.9   * 163* 198*   < > 239*   < > 273* 530*   ALBUMIN  --   --   --   --   --   --  2.6*  --    PROTTOTAL  --   --   --   --   --   --  5.4*  --    BILITOTAL  --   --   --   --   --   --  0.6  --    ALKPHOS  --   --   --   --   --   --  107  --    ALT  --   --   --   --   --   --  24  --    AST  --   --   --   --   --   --  27  --    TROPI  --   --   --   --   --   --   --  0.034    < > = values in this interval not displayed.     Recent Labs   Lab 05/26/20  1202 05/26/20  0857 05/26/20  0724 05/25/20 2059 05/25/20  1214 05/25/20  0707 05/25/20  0145 05/24/20  0627  05/23/20  2149  05/23/20  1420   GLC  --  331*  --   --   --  163*  --  198*  --  163*  --  200*   *  --  256* 85 325*  --  128*  --    < >  --    < >  --     < > = values in this interval not displayed.       Imaging:   No results found for this or any previous visit (from the past 24 hour(s)).

## 2020-05-26 NOTE — PLAN OF CARE
Discharge Planner OT   Patient plan for discharge: home     Current status:  pt completed supine to/from sit EOB independent, amb without AD to/from bathroom SBA, completed toilet transfer with grab bars Carolyn, independent to stand at sink for hygiene task, pt able to don/doff pants independent, pt declined socks as pt has ace wraps on and did not want to trial at this time, pt feels he will be able to complete once home and wraps are off.   Barriers to return to prior living situation: deconditioning     Recommendations for discharge: home with assistance from wife for IADLs and home OT per plan established by the Occupational Therapist     Rationale for recommendations: pt has good family support, spouse available to assist as needed for ADLS/IADLS pt limited by activity tolerance.        Entered by: Penny Rosales 05/26/2020 12:49 PM      Pt has met OT GOALS, see discharge summary

## 2020-05-26 NOTE — CONSULTS
Patient to establish care at [facility] ( [phone] ) Called to schedule appointment. They require some records to be faxed, the MD will review for in person appointment vs phone. Clinic will call patient. Info on AVS  Facesheet, H&P, labs FAXED to [phone]

## 2020-05-27 LAB
ANION GAP SERPL CALCULATED.3IONS-SCNC: 6 MMOL/L (ref 3–14)
BACTERIA SPEC CULT: NO GROWTH
BUN SERPL-MCNC: 15 MG/DL (ref 7–30)
CALCIUM SERPL-MCNC: 8.3 MG/DL (ref 8.5–10.1)
CHLORIDE SERPL-SCNC: 98 MMOL/L (ref 94–109)
CO2 SERPL-SCNC: 29 MMOL/L (ref 20–32)
CREAT SERPL-MCNC: 1.4 MG/DL (ref 0.66–1.25)
GFR SERPL CREATININE-BSD FRML MDRD: 47 ML/MIN/{1.73_M2}
GLUCOSE BLDC GLUCOMTR-MCNC: 273 MG/DL (ref 70–99)
GLUCOSE BLDC GLUCOMTR-MCNC: 364 MG/DL (ref 70–99)
GLUCOSE BLDC GLUCOMTR-MCNC: 379 MG/DL (ref 70–99)
GLUCOSE BLDC GLUCOMTR-MCNC: 86 MG/DL (ref 70–99)
GLUCOSE BLDC GLUCOMTR-MCNC: 95 MG/DL (ref 70–99)
GLUCOSE BLDC GLUCOMTR-MCNC: 99 MG/DL (ref 70–99)
GLUCOSE SERPL-MCNC: 341 MG/DL (ref 70–99)
Lab: NORMAL
POTASSIUM SERPL-SCNC: 3 MMOL/L (ref 3.4–5.3)
SODIUM SERPL-SCNC: 133 MMOL/L (ref 133–144)
SPECIMEN SOURCE: NORMAL

## 2020-05-27 PROCEDURE — 80048 BASIC METABOLIC PNL TOTAL CA: CPT | Performed by: HOSPITALIST

## 2020-05-27 PROCEDURE — 00000146 ZZHCL STATISTIC GLUCOSE BY METER IP

## 2020-05-27 PROCEDURE — 25000132 ZZH RX MED GY IP 250 OP 250 PS 637: Performed by: HOSPITALIST

## 2020-05-27 PROCEDURE — 36415 COLL VENOUS BLD VENIPUNCTURE: CPT | Performed by: HOSPITALIST

## 2020-05-27 PROCEDURE — 99232 SBSQ HOSP IP/OBS MODERATE 35: CPT | Performed by: HOSPITALIST

## 2020-05-27 PROCEDURE — 99233 SBSQ HOSP IP/OBS HIGH 50: CPT | Performed by: INTERNAL MEDICINE

## 2020-05-27 PROCEDURE — 25000131 ZZH RX MED GY IP 250 OP 636 PS 637: Performed by: HOSPITALIST

## 2020-05-27 PROCEDURE — 12000000 ZZH R&B MED SURG/OB

## 2020-05-27 PROCEDURE — 25000128 H RX IP 250 OP 636: Performed by: INTERNAL MEDICINE

## 2020-05-27 RX ORDER — TORSEMIDE 10 MG/1
10 TABLET ORAL EVERY MORNING
Status: DISCONTINUED | OUTPATIENT
Start: 2020-05-27 | End: 2020-05-27

## 2020-05-27 RX ADMIN — POLYETHYLENE GLYCOL 3350 17 G: 17 POWDER, FOR SOLUTION ORAL at 20:38

## 2020-05-27 RX ADMIN — FUROSEMIDE 40 MG: 10 INJECTION, SOLUTION INTRAMUSCULAR; INTRAVENOUS at 08:59

## 2020-05-27 RX ADMIN — APIXABAN 2.5 MG: 2.5 TABLET, FILM COATED ORAL at 20:38

## 2020-05-27 RX ADMIN — UMECLIDINIUM 1 PUFF: 62.5 AEROSOL, POWDER ORAL at 08:57

## 2020-05-27 RX ADMIN — APIXABAN 2.5 MG: 2.5 TABLET, FILM COATED ORAL at 08:58

## 2020-05-27 RX ADMIN — VERAPAMIL HYDROCHLORIDE 240 MG: 240 TABLET, FILM COATED, EXTENDED RELEASE ORAL at 21:56

## 2020-05-27 RX ADMIN — FUROSEMIDE 40 MG: 10 INJECTION, SOLUTION INTRAMUSCULAR; INTRAVENOUS at 20:38

## 2020-05-27 RX ADMIN — PRAVASTATIN SODIUM 20 MG: 20 TABLET ORAL at 21:56

## 2020-05-27 RX ADMIN — INSULIN GLARGINE 14 UNITS: 100 INJECTION, SOLUTION SUBCUTANEOUS at 10:38

## 2020-05-27 ASSESSMENT — ACTIVITIES OF DAILY LIVING (ADL)
ADLS_ACUITY_SCORE: 13
ADLS_ACUITY_SCORE: 11
ADLS_ACUITY_SCORE: 13
ADLS_ACUITY_SCORE: 11
ADLS_ACUITY_SCORE: 13
ADLS_ACUITY_SCORE: 11

## 2020-05-27 NOTE — PROGRESS NOTES
[facility]  Hospitalist Progress Note    When I evaluated patient: 05/27/2020    Assessment & Plan   Tc Garcia is a 80 year old male who was admitted on 5/21/2020.  Patient was recently discharged from [facility] following a stay from 5/10 to 5/14.  He is currently admitted for following DKA.  He had a left Pleurx catheter placed during his prior admission for recurrent pleural effusion    DKA due to lack of insulin in his insulin pump  Some concern with cognition because upon restart of his pumon on 5/22, he did develop hyperglycemia followed by hypoglylcemia. SLUMS screen was negative  -  Increase Lantus to 14 units daily and   - Increase premeal insulin to 1 unit insulin per 10 gms of carb   - moderate sliding scale to continue.   - continue to monitor BS  - needs endocrinology follow-up prior to restarting the pump, has difficulty with keeping his BS in check. He turns off the device frequently, forgets when it is on, etc  -plan is to discharge him on basal and premeal insulin. He has used insulin before starting pump 2 years ago    Shortness of breath multifactorial due to recurrent transudative pleural effusion s/p pleurx catheter placement, pulmonary hypertension, and HFpEF  Chest CT revealed no pneumonia, improvement in the pleural effusion with pleurx drainage  COVID negative  CT without pneumonia and abx stopped  - prn pleurx drainage  - coninue IV lasix, cardiology following, appreciate assistance.   - holding losartan/hctz  - prn hydralazine  - large disconnect between what medications the cardiologist are titrating and what medications the patient is truly taking. So he needs a home RN at home to help with med management. Of note, his SLUMS was normal. I wonder if the home hypoglycemia is leading to intermittent confusion, difficulty with med management, etc    Chronic atrial fibrillation and flutter  - continue apixiban and verapamil.    Hypokalemia  - monitor    DVT  Prophylaxis: Patient is on Eliquis  Code Status: Full Code     Disposition: Expected discharge in 2 days likely to home once his is off the IV diuretic    Anny Beach MD  Hospitalist      Interval History   Care resumed today, chart reviewed, discussed with RN. Patient is up in the bed. Denies chest pain. Dyspnea continues to improves. No pain, nausea, diarrhea. He is concerned about being released early from hospital give readmission.     -Data reviewed today: I reviewed all new labs and imaging results over the last 24 hours.  Physical Exam   Temp: 98.6  F (37  C) Temp src: Oral BP: 122/70 Pulse: 68 Heart Rate: 72 Resp: 18 SpO2: 95 % O2 Device: None (Room air)    Vitals:    05/26/20 0621 05/26/20 1456 05/27/20 0602   Weight: 72.6 kg (160 lb 0.9 oz) 71.7 kg (158 lb 1.6 oz) 70.1 kg (154 lb 8.7 oz)     Vital Signs with Ranges  Temp:  [98  F (36.7  C)-98.6  F (37  C)] 98.6  F (37  C)  Pulse:  [68-77] 68  Heart Rate:  [71-87] 72  Resp:  [16-18] 18  BP: (118-146)/(63-84) 122/70  SpO2:  [92 %-97 %] 95 %  I/O last 3 completed shifts:  In: 120 [P.O.:120]  Out: 700 [Chest Tube:700]    Constitutional: Awake, alert. Up in bed,  Co operative, no apparent distress   HEENT: PERRLA EOMI  Respiratory: Bi basilar crackles noted wit diminished breath sounds in the bases, Pleurx catheter noted on left.  Cardiovascular: S1S2 irregular, systolic Murmur (+)  GI: Normal bowel sounds, soft, non-distended, non-tender  Skin/Integumen: No rashes, no cyanosis,ectremity edema (+)  Neuro : moving all 4 extremities, no focal deficit noted     Medications       apixaban ANTICOAGULANT  2.5 mg Oral BID     furosemide  40 mg Intravenous Q12H     insulin aspart   Subcutaneous TID w/meals     insulin aspart  1-7 Units Subcutaneous TID AC     insulin aspart  1-5 Units Subcutaneous At Bedtime     insulin glargine  14 Units Subcutaneous QAM AC     polyethylene glycol  17 g Oral BID     pravastatin  20 mg Oral At Bedtime     senna-docusate  1 tablet  Oral BID     sodium chloride (PF)  3 mL Intracatheter Q8H     umeclidinium  1 puff Inhalation Daily     verapamil ER  240 mg Oral At Bedtime       Data   Recent Labs   Lab 05/27/20  0730 05/26/20  0857 05/25/20  0707 05/24/20  0627  05/23/20  0555  05/21/20  1916 05/21/20  1302   WBC  --   --  10.4 12.3*  --  12.0*  --  13.6* 15.7*   HGB  --   --  8.9* 9.0*  --  8.4*  --  8.9* 9.6*   MCV  --   --  85 85  --  85  --  86 87   PLT  --   --  378 348  --  360  --  446 537*    134 137 135   < > 137   < > 139 137   POTASSIUM 3.0* 3.7 3.6 3.9   < > 4.0   < > 3.4 4.5   CHLORIDE 98 101 104 103   < > 103   < > 104 97   CO2 29 26 29 26   < > 26   < > 24 20   BUN 15 13 9 12   < > 13   < > 24 21   CR 1.40* 1.35* 1.36* 1.35*   < > 1.42*   < > 1.76* 1.81*   ANIONGAP 6 7 4 6   < > 8   < > 11 20*   LLOYD 8.3* 8.0* 8.0* 8.0*   < > 7.8*   < > 8.4* 8.9   * 331* 163* 198*   < > 239*   < > 273* 530*   ALBUMIN  --   --   --   --   --   --   --  2.6*  --    PROTTOTAL  --   --   --   --   --   --   --  5.4*  --    BILITOTAL  --   --   --   --   --   --   --  0.6  --    ALKPHOS  --   --   --   --   --   --   --  107  --    ALT  --   --   --   --   --   --   --  24  --    AST  --   --   --   --   --   --   --  27  --    TROPI  --   --   --   --   --   --   --   --  0.034    < > = values in this interval not displayed.     Recent Labs   Lab 05/27/20 0730 05/27/20  0552 05/27/20  0234 05/26/20  2103 05/26/20  1658 05/26/20  1202 05/26/20  0857  05/25/20  0707  05/24/20  0627  05/23/20  2149   *  --   --   --   --   --  331*  --  163*  --  198*  --  163*   BGM  --  379* 364* 179* 138* 311*  --    < >  --    < >  --    < >  --     < > = values in this interval not displayed.       Imaging:   No results found for this or any previous visit (from the past 24 hour(s)).

## 2020-05-27 NOTE — PROGRESS NOTES
[facility]  Cardiology Progress Note    Date of Service (when I saw the patient): 05/27/2020  Primary Cardiologist: Dr. Julien/Dr. Bonilla/Dr. Camejo       Interval History:  He thinks his breathing is better. He urinates frequently. He has no new concerns or symptoms.    ----------------------------------------------------------------------------------------    Assessment:  Tc Garcia is a 80 year old male who was admitted on 5/21/2020 with DKA. Cardiology was consulted due to CHF.     Acute on Chronic HFpEF   -- dry weight is ~150-155 Ibs   -- BNP on admission 5,500 (7,000 previously)  -- pta diuretic regimen furosemide 20 mg daily  -- currently receiving IV lasix 40 mg daily which was increased to BID on 5/26; wt down today 154 Ibs I/O: net positive 8 L since admission (but output has not been measured consistently)     Pulmonary Hypertension   -- recently established with Dr. Bonilla; plan was to initiate sildenafil soon as outpatient   -- RHC on 5/13 with moderate PH with normal wedge pressure and low normal cardiac index; markedly positive vasodilatory study (NO)  -- he was also enrolled in pulm rehab     DKA   -- in the setting of forgetting to refill his insulin     Hx of Recurrent left pleural effusion  -- has PleurX in place following traumatic fall and hemothorax in 10/2019.      Stage III CKD  -- Cr stable; today 1.4 which is close to his baseline     Type 2 diabetes mellitus, on long-term insulin therapy     Hypertension  -- pta hyzaar; verapamil, and clonidine (?); plan was to decrease clonidine and increase losartan  -- controlled this AM but was high last night (5/25)- he is only on diltiazem this admission     Paroxysmal atrial fibrillation/flutter  -- Rate controlled with verapamil 240 mg daily  -- low-dose Eliquis 2.5 mg 2 times daily for CVA prophylaxis  -- he was on amio before but was discontinued due to prolonged QTc (580 ms on 5/14; no repeat ECG since then); repeat ECG on this  admission shows a fib with QTc of 623 ms     Chronic anemia  -- Hgb around 9    ----------------------------------------------------------------------------------------    Plan:  -- Continue with IV lasix today; switch to PO furosemide 20 mg BID  -- Follow up with [facility] (previously scheduled)    ----------------------------------------------------------------------------------------  Physical Exam   Temp: 98.2  F (36.8  C) Temp src: Oral BP: 124/75 Pulse: 77 Heart Rate: 76 Resp: 16 SpO2: 96 % O2 Device: None (Room air)    Vitals:    05/26/20 0621 05/26/20 1456 05/27/20 0602   Weight: 72.6 kg (160 lb 0.9 oz) 71.7 kg (158 lb 1.6 oz) 70.1 kg (154 lb 8.7 oz)     GEN:  NAD. Sleepy. Oxygen on room air.   HEENT: Mucous membranes moist.  NECK:  Mild JVD at 45 degree angle.  C/V:  Irregularly irregular rhythm but normal rate. No m,r,g.   RESP: Slightly diminished at the bases otherwise CTA bilaterally.  GI: Abdomen soft, nontender, nondistended.    EXTREM: 2+ LE edema.   NEURO: Alert and oriented, cooperative.   PSYCH: Normal affect.  SKIN: Warm and dry.   VASC: 2+ radial and dorsalis pedis pulses bilaterally.      Medications       apixaban ANTICOAGULANT  2.5 mg Oral BID     furosemide  40 mg Intravenous Q12H     insulin aspart   Subcutaneous TID w/meals     insulin aspart  1-7 Units Subcutaneous TID AC     insulin aspart  1-5 Units Subcutaneous At Bedtime     insulin glargine  14 Units Subcutaneous QAM AC     polyethylene glycol  17 g Oral BID     pravastatin  20 mg Oral At Bedtime     senna-docusate  1 tablet Oral BID     sodium chloride (PF)  3 mL Intracatheter Q8H     torsemide  10 mg Oral QAM     umeclidinium  1 puff Inhalation Daily     verapamil ER  240 mg Oral At Bedtime       Data   Reviewed.     Tele: A fib with CVR      Tori Auguste PA-C   5/27/2020  Pager: [phone]

## 2020-05-27 NOTE — PLAN OF CARE
A/O x4. VSS on RA. Up independently. Tolerating mod carb diet. Lung sounds clear. Bowel sounds active. Passing flatus. +BM's tonight. Adequate urine output. Legs in ace wraps. Mepilex to coccyx. Denies pain and nausea. Tele: ST. [facility]. L-pleurx drain WDL with transparent dressing.

## 2020-05-27 NOTE — PLAN OF CARE
Alert and oriented x4. Vital signs stable on room air. Up independently. Tolerating mod carb diet. Lung sounds clear. Bowel sounds active, + flatus, BM yesterday. Adequate urine output. Edema to BLE - ace wraps on. Denies pain and nausea.

## 2020-05-27 NOTE — PROVIDER NOTIFICATION
"Hospitalist paged \"0200 Blood sugar 364 up from 179. Should I correct this value? Please advise\"    Dr. Sorto Advised to recheck blood sugar at 0600 and give morning Novolog if necessary.   "

## 2020-05-27 NOTE — PLAN OF CARE
A/Ox4. VSS on RA. BS active,+flatus, +bm.  LS diminished. Skin: intact. CMS: intact. Pain: denies. Up independent. Tolerating mod carb diet. Voiding adequately.  Tele: NSR.  Plan: continue to monitor. 0550 Blood sugar 379, 5 units of novolog given, per Dr. Sorto's verbal recommendation.

## 2020-05-28 LAB
ANION GAP SERPL CALCULATED.3IONS-SCNC: 6 MMOL/L (ref 3–14)
BUN SERPL-MCNC: 12 MG/DL (ref 7–30)
CALCIUM SERPL-MCNC: 7.8 MG/DL (ref 8.5–10.1)
CHLORIDE SERPL-SCNC: 98 MMOL/L (ref 94–109)
CO2 SERPL-SCNC: 29 MMOL/L (ref 20–32)
CREAT SERPL-MCNC: 1.41 MG/DL (ref 0.66–1.25)
GFR SERPL CREATININE-BSD FRML MDRD: 46 ML/MIN/{1.73_M2}
GLUCOSE BLDC GLUCOMTR-MCNC: 102 MG/DL (ref 70–99)
GLUCOSE BLDC GLUCOMTR-MCNC: 107 MG/DL (ref 70–99)
GLUCOSE BLDC GLUCOMTR-MCNC: 125 MG/DL (ref 70–99)
GLUCOSE BLDC GLUCOMTR-MCNC: 169 MG/DL (ref 70–99)
GLUCOSE BLDC GLUCOMTR-MCNC: 344 MG/DL (ref 70–99)
GLUCOSE BLDC GLUCOMTR-MCNC: 54 MG/DL (ref 70–99)
GLUCOSE BLDC GLUCOMTR-MCNC: 79 MG/DL (ref 70–99)
GLUCOSE BLDC GLUCOMTR-MCNC: 90 MG/DL (ref 70–99)
GLUCOSE BLDC GLUCOMTR-MCNC: 92 MG/DL (ref 70–99)
GLUCOSE SERPL-MCNC: 233 MG/DL (ref 70–99)
INTERPRETATION ECG - MUSE: NORMAL
POTASSIUM SERPL-SCNC: 3.1 MMOL/L (ref 3.4–5.3)
SODIUM SERPL-SCNC: 133 MMOL/L (ref 133–144)

## 2020-05-28 PROCEDURE — 25000128 H RX IP 250 OP 636: Performed by: INTERNAL MEDICINE

## 2020-05-28 PROCEDURE — 12000000 ZZH R&B MED SURG/OB

## 2020-05-28 PROCEDURE — 25000132 ZZH RX MED GY IP 250 OP 250 PS 637: Performed by: HOSPITALIST

## 2020-05-28 PROCEDURE — 00000146 ZZHCL STATISTIC GLUCOSE BY METER IP

## 2020-05-28 PROCEDURE — 80048 BASIC METABOLIC PNL TOTAL CA: CPT | Performed by: HOSPITALIST

## 2020-05-28 PROCEDURE — 25000131 ZZH RX MED GY IP 250 OP 636 PS 637: Performed by: HOSPITALIST

## 2020-05-28 PROCEDURE — 99232 SBSQ HOSP IP/OBS MODERATE 35: CPT | Performed by: HOSPITALIST

## 2020-05-28 PROCEDURE — 36415 COLL VENOUS BLD VENIPUNCTURE: CPT | Performed by: HOSPITALIST

## 2020-05-28 RX ORDER — POTASSIUM CHLORIDE 750 MG/1
30 TABLET, EXTENDED RELEASE ORAL ONCE
Status: COMPLETED | OUTPATIENT
Start: 2020-05-28 | End: 2020-05-28

## 2020-05-28 RX ORDER — POTASSIUM CHLORIDE 750 MG/1
10 TABLET, EXTENDED RELEASE ORAL 2 TIMES DAILY
Status: DISCONTINUED | OUTPATIENT
Start: 2020-05-29 | End: 2020-05-29 | Stop reason: HOSPADM

## 2020-05-28 RX ADMIN — APIXABAN 2.5 MG: 2.5 TABLET, FILM COATED ORAL at 21:20

## 2020-05-28 RX ADMIN — DEXTROSE 15 G: 15 GEL ORAL at 00:13

## 2020-05-28 RX ADMIN — POTASSIUM CHLORIDE 30 MEQ: 10 TABLET, EXTENDED RELEASE ORAL at 14:24

## 2020-05-28 RX ADMIN — UMECLIDINIUM 1 PUFF: 62.5 AEROSOL, POWDER ORAL at 09:16

## 2020-05-28 RX ADMIN — APIXABAN 2.5 MG: 2.5 TABLET, FILM COATED ORAL at 09:16

## 2020-05-28 RX ADMIN — FUROSEMIDE 40 MG: 10 INJECTION, SOLUTION INTRAMUSCULAR; INTRAVENOUS at 21:20

## 2020-05-28 RX ADMIN — PRAVASTATIN SODIUM 20 MG: 20 TABLET ORAL at 21:20

## 2020-05-28 RX ADMIN — INSULIN GLARGINE 14 UNITS: 100 INJECTION, SOLUTION SUBCUTANEOUS at 09:22

## 2020-05-28 RX ADMIN — FUROSEMIDE 40 MG: 10 INJECTION, SOLUTION INTRAMUSCULAR; INTRAVENOUS at 09:16

## 2020-05-28 RX ADMIN — VERAPAMIL HYDROCHLORIDE 240 MG: 240 TABLET, FILM COATED, EXTENDED RELEASE ORAL at 21:20

## 2020-05-28 ASSESSMENT — ACTIVITIES OF DAILY LIVING (ADL)
ADLS_ACUITY_SCORE: 11
ADLS_ACUITY_SCORE: 11
ADLS_ACUITY_SCORE: 13
ADLS_ACUITY_SCORE: 11

## 2020-05-28 NOTE — PROGRESS NOTES
"CLINICAL NUTRITION SERVICES  -  ASSESSMENT NOTE    Malnutrition:   % Weight Loss:  > 7.5% in 3 months (severe malnutrition)  % Intake:  Decreased intake does not meet criteria for malnutrition   Subcutaneous Fat Loss:  Deferred  Muscle Loss:  Deferred  Fluid Retention:  Trace-Mild    Malnutrition Diagnosis: Non-Severe malnutrition  In Context of:  Acute illness or injury  Chronic illness or disease     REASON FOR ASSESSMENT  Tc Garcia is a 80 year old male seen by Registered Dietitian for LOS    NUTRITION HISTORY  - Information obtained from EMR.  - PMH includes COPD, HTN, CRF stage 3, afib, DM2.   - Comes in with DKA on 5/21, related to no insulin his his pump. Pt had been dry heaving and had SOB, had not been eating. RN noted that appetite had been good until 2 days PTA.   - Recently admitted 5/10-5/14.     - Lives at home with his wife.    Info obtained during recent admission 4/27 -   \"- Information obtained from patient via phone (unable to visit in person due to distancing precautions during COVID-19 pandemic).  He states that after he discharged hospital in early January 2020, he \"took awhile to get started\" with his oral intake but them did very well (\"cooking and eating fine\") until about two weeks ago.  For about two weeks prior to admit he was so weak and SOB that his oral intake was quite limited.  He has been taking an nutritional supplement at home but really does not like these.  \"I'm hoping that once I'm eating better I won't need to take them anymore\".    - In regards to diabetes, patient has his own pump and does carb counting at home.\"    CURRENT NUTRITION ORDERS  Diet Order:     Moderate Consistent Carbohydrate     Current Intake/Tolerance:    Since admission patient has been ordering meals TID.   Recent order history shows pt is ordering adequate meals :  5/27 - 1977 kcal, 65 g pro  5/26 - 1918 kcal, 52 g pro  5/25 - 2358 kcal, 92 g pro    - RN documentation shows pt is eating % of " "meals.     NUTRITION FOCUSED PHYSICAL ASSESSMENT FOR DIAGNOSING MALNUTRITION)  No:  Deferred due to departmental precautions to prevent spread of COVID19 during Pandemic.           Observed:    Deferred    Obtained from Chart/Interdisciplinary Team:  Trace-Mild edema  Last BM 5/28  Eric - Nutrition 3; total 21    ANTHROPOMETRICS  Height: 5' 10\"  Weight:  154 lbs 8.68 oz (70.1 kg)  Body mass index is 22.17 kg/m .   Weight Status: Normal BMI  IBW:  75.5 kg  %IBW: 93%  Weight History: 22# loss indicated in the past 3 months (12.5%).   Wt Readings from Last 10 Encounters:   05/28/20 70.1 kg (154 lb 8.7 oz)   05/21/20 70.3 kg (155 lb)   05/18/20 74.4 kg (164 lb)   05/14/20 68.4 kg (150 lb 12.8 oz)   04/30/20 72.6 kg (160 lb 1.6 oz)   04/16/20 72.6 kg (160 lb)   03/23/20 74.8 kg (165 lb)   02/26/20 79.8 kg (176 lb)   02/25/20 80 kg (176 lb 4.8 oz)   01/24/20 79.4 kg (175 lb)     LABS  Labs reviewed  K 3.1 (L)  Recent Labs   Lab 05/28/20  0747 05/28/20  0243 05/28/20  0101 05/28/20  0032 05/28/20  0009 05/27/20  2107 05/27/20  1858  05/27/20  0730  05/26/20  0857  05/25/20  0707  05/24/20  0627  05/23/20  2149   *  --   --   --   --   --   --   --  341*  --  331*  --  163*  --  198*  --  163*   BGM  --  169* 107* 79 54* 99 95   < >  --    < >  --    < >  --    < >  --    < >  --     < > = values in this interval not displayed.     Lab Results   Component Value Date    A1C 8.2 04/25/2020    A1C 7.7 10/31/2019    A1C 7.2 06/30/2009    A1C 7.7 02/10/2009    A1C 8.1 @ 10/22/2008       MEDICATIONS  Medications reviewed  Lasix 40 mg BID  MSSI + 14 units Lantus q AM  Bowel meds scheduled      ASSESSED NUTRITION NEEDS PER APPROVED PRACTICE GUIDELINES:  Dosing Weight 70.1 kg  Estimated Energy Needs: 4563-6964 kcals (25-30 Kcal/Kg)  Justification: maintenance  Estimated Protein Needs:  grams protein (1.2-1.5 g pro/Kg)  Justification: preservation of lean body mass  Estimated Fluid Needs: 1 mL/kcal  Justification: " Maintenance or per MD    MALNUTRITION:  % Weight Loss:  > 7.5% in 3 months (severe malnutrition)  % Intake:  Decreased intake does not meet criteria for malnutrition   Subcutaneous Fat Loss:  Deferred  Muscle Loss:  Deferred  Fluid Retention:  Trace-Mild    Malnutrition Diagnosis: Non-Severe malnutrition  In Context of:  Acute illness or injury  Chronic illness or disease    NUTRITION DIAGNOSIS:  Unintended weight loss related to inadequate oral intakes, multiple recent admissions as evidenced by weight loss of >12% in the past 3 months.       NUTRITION INTERVENTIONS  Recommendations / Nutrition Prescription  MCHO diet     PRN supplements - not required, pt eating well now.       Implementation  Nutrition education: No education needs assessed at this time      Nutrition Goals  Intake of >/=75% meals TID.      MONITORING AND EVALUATION:  Progress towards goals will be monitored and evaluated per protocol and Practice Guidelines    Jaqui Smith RD, LD  Pager: [phone]

## 2020-05-28 NOTE — PROVIDER NOTIFICATION
Paged hospitalist per RN request to see if pt could get IV potassium ordered     Stated to check with pharmacy to see if oral pills could be crushed since IV would affect his Cr level.

## 2020-05-28 NOTE — PLAN OF CARE
A/Ox4. VSS on RA ex hypertensive at times. BS active,+flatus, +bm.  LS diminished. Skin: intact. R arm w/ redness near elbow. Pt states not painful. CMS: ex baseline n/t in fingers and feet. Pain denies. Up independent. Tolerating mod carb diet. Voiding adequately. Pt checked blood sugar with own monitor around midnight, stated it was 43. Gave juice and popscicle, rechecked for 54. Administered glucose gel, recheck for 79. Gave more apple juice and marlyn crackers, recheck 107. Tele: NSR. Plan to drain pleurex drain today.

## 2020-05-28 NOTE — PROGRESS NOTES
[facility]  Hospitalist Progress Note    When I evaluated patient: 05/28/2020    Assessment & Plan   Tc Garcia is a 80 year old male who was admitted on 5/21/2020.  Patient was recently discharged from [facility] following a stay from 5/10 to 5/14.  He is currently admitted for following DKA.  He had a left Pleurx catheter placed during his prior admission for recurrent pleural effusion    DKA due to lack of insulin in his insulin pump  Some concern with cognition because upon restart of his pumon on 5/22, he did develop hyperglycemia followed by hypoglylcemia. SLUMS screen was negative  - Hypoglycemic with Lantus 14 units daily and premeal insulin to 1 unit insulin per 10 gms of carb. Changed lantus to 12 units daily and novolog to 1 unit per 12 gms of carb. Moderate sliding scale to continue.   - continue to monitor BS  - needs endocrinology follow-up prior to restarting the pump, has difficulty with keeping his BS in check. He turns off the device frequently, forgets when it is on, etc  -plan is to discharge him on basal and premeal insulin. He has used insulin before starting pump 2 years ago    Shortness of breath multifactorial due to recurrent transudative pleural effusion s/p pleurx catheter placement, pulmonary hypertension, and HFpEF  Chest CT revealed no pneumonia, improvement in the pleural effusion with pleurx drainage  COVID negative  CT without pneumonia and abx stopped  - prn pleurx drainage - planned today.   -  IV lasix transitioned to PO per cardiology, appreciate assistance.   - holding losartan/hctz  - prn hydralazine  - large disconnect between what medications the cardiologist are titrating and what medications the patient is truly taking. So he needs a home RN at home to help with med management. Of note, his SLUMS was normal. I wonder if the home hypoglycemia is leading to intermittent confusion, difficulty with med management, etc. Home health RN to review  medications after discharge and to make sure he is taking them per instruction.     Chronic atrial fibrillation and flutter  - continue apixiban and verapamil.    Hypokalemia  - monitor    DVT Prophylaxis: Patient is on Eliquis  Code Status: Full Code     Disposition: Expected discharge in 1 day, home with HH if BS stable in next 24 hours.     Anny Beach MD  Hospitalist      Interval History   Denies chest pain or dyspnea. Hypoglycemic overnight to 40s but asymptomatic.      -Data reviewed today: I reviewed all new labs and imaging results over the last 24 hours.  Physical Exam   Temp: 98.2  F (36.8  C) Temp src: Oral BP: 121/66 Pulse: 76 Heart Rate: 63 Resp: 16 SpO2: 100 % O2 Device: None (Room air)    Vitals:    05/26/20 1456 05/27/20 0602 05/28/20 0635   Weight: 71.7 kg (158 lb 1.6 oz) 70.1 kg (154 lb 8.7 oz) 70.1 kg (154 lb 8.7 oz)     Vital Signs with Ranges  Temp:  [98.2  F (36.8  C)-98.5  F (36.9  C)] 98.2  F (36.8  C)  Pulse:  [71-76] 76  Heart Rate:  [63-67] 63  Resp:  [16-18] 16  BP: (121-149)/(66-88) 121/66  SpO2:  [95 %-100 %] 100 %  I/O last 3 completed shifts:  In: 1920 [P.O.:1920]  Out: -     Constitutional: Awake, alert. Up in bed,  Co operative, very pleasant. No apparent distress   HEENT: PERRLA EOMI  Respiratory: Bi basilar crackles noted with diminished breath sounds in the bases unchanged, Pleurx catheter noted on left.  Cardiovascular: S1S2 irregular, systolic Murmur (+)  GI: Normal bowel sounds, soft, non-distended, non-tender  Skin/Integumen: No rashes, no cyanosis,ectremity edema (+)  Neuro : moving all 4 extremities, no focal deficit noted.    Medications       apixaban ANTICOAGULANT  2.5 mg Oral BID     furosemide  40 mg Intravenous Q12H     insulin aspart   Subcutaneous TID w/meals     insulin aspart  1-7 Units Subcutaneous TID AC     insulin aspart  1-5 Units Subcutaneous At Bedtime     [START ON 5/29/2020] insulin glargine  12 Units Subcutaneous QAM AC     polyethylene glycol  17 g  Oral BID     [START ON 5/29/2020] potassium chloride  10 mEq Oral BID     pravastatin  20 mg Oral At Bedtime     senna-docusate  1 tablet Oral BID     sodium chloride (PF)  3 mL Intracatheter Q8H     umeclidinium  1 puff Inhalation Daily     verapamil ER  240 mg Oral At Bedtime       Data   Recent Labs   Lab 05/28/20  0747 05/27/20  0730 05/26/20  0857 05/25/20  0707 05/24/20  0627  05/23/20  0555  05/21/20  1916   WBC  --   --   --  10.4 12.3*  --  12.0*  --  13.6*   HGB  --   --   --  8.9* 9.0*  --  8.4*  --  8.9*   MCV  --   --   --  85 85  --  85  --  86   PLT  --   --   --  378 348  --  360  --  446    133 134 137 135   < > 137   < > 139   POTASSIUM 3.1* 3.0* 3.7 3.6 3.9   < > 4.0   < > 3.4   CHLORIDE 98 98 101 104 103   < > 103   < > 104   CO2 29 29 26 29 26   < > 26   < > 24   BUN 12 15 13 9 12   < > 13   < > 24   CR 1.41* 1.40* 1.35* 1.36* 1.35*   < > 1.42*   < > 1.76*   ANIONGAP 6 6 7 4 6   < > 8   < > 11   LLOYD 7.8* 8.3* 8.0* 8.0* 8.0*   < > 7.8*   < > 8.4*   * 341* 331* 163* 198*   < > 239*   < > 273*   ALBUMIN  --   --   --   --   --   --   --   --  2.6*   PROTTOTAL  --   --   --   --   --   --   --   --  5.4*   BILITOTAL  --   --   --   --   --   --   --   --  0.6   ALKPHOS  --   --   --   --   --   --   --   --  107   ALT  --   --   --   --   --   --   --   --  24   AST  --   --   --   --   --   --   --   --  27    < > = values in this interval not displayed.     Recent Labs   Lab 05/28/20  1250 05/28/20  0747 05/28/20  0243 05/28/20  0101 05/28/20  0032 05/28/20  0009  05/27/20  0730  05/26/20  0857  05/25/20  0707  05/24/20  0627   GLC  --  233*  --   --   --   --   --  341*  --  331*  --  163*  --  198*   *  --  169* 107* 79 54*   < >  --    < >  --    < >  --    < >  --     < > = values in this interval not displayed.       Imaging:   No results found for this or any previous visit (from the past 24 hour(s)).

## 2020-05-29 VITALS
WEIGHT: 153 LBS | HEART RATE: 78 BPM | RESPIRATION RATE: 14 BRPM | BODY MASS INDEX: 21.95 KG/M2 | DIASTOLIC BLOOD PRESSURE: 80 MMHG | SYSTOLIC BLOOD PRESSURE: 131 MMHG | TEMPERATURE: 98.1 F | OXYGEN SATURATION: 97 %

## 2020-05-29 DIAGNOSIS — I50.33 ACUTE ON CHRONIC HEART FAILURE WITH PRESERVED EJECTION FRACTION (HFPEF) (H): Primary | ICD-10-CM

## 2020-05-29 LAB
ANION GAP SERPL CALCULATED.3IONS-SCNC: 5 MMOL/L (ref 3–14)
BUN SERPL-MCNC: 12 MG/DL (ref 7–30)
CALCIUM SERPL-MCNC: 7.7 MG/DL (ref 8.5–10.1)
CHLORIDE SERPL-SCNC: 98 MMOL/L (ref 94–109)
CO2 SERPL-SCNC: 31 MMOL/L (ref 20–32)
CREAT SERPL-MCNC: 1.42 MG/DL (ref 0.66–1.25)
GFR SERPL CREATININE-BSD FRML MDRD: 46 ML/MIN/{1.73_M2}
GLUCOSE BLDC GLUCOMTR-MCNC: 196 MG/DL (ref 70–99)
GLUCOSE BLDC GLUCOMTR-MCNC: 246 MG/DL (ref 70–99)
GLUCOSE SERPL-MCNC: 377 MG/DL (ref 70–99)
POTASSIUM SERPL-SCNC: 3.3 MMOL/L (ref 3.4–5.3)
SODIUM SERPL-SCNC: 134 MMOL/L (ref 133–144)

## 2020-05-29 PROCEDURE — 25000131 ZZH RX MED GY IP 250 OP 636 PS 637: Performed by: HOSPITALIST

## 2020-05-29 PROCEDURE — 36415 COLL VENOUS BLD VENIPUNCTURE: CPT | Performed by: HOSPITALIST

## 2020-05-29 PROCEDURE — 25000132 ZZH RX MED GY IP 250 OP 250 PS 637: Performed by: HOSPITALIST

## 2020-05-29 PROCEDURE — 00000146 ZZHCL STATISTIC GLUCOSE BY METER IP

## 2020-05-29 PROCEDURE — 80048 BASIC METABOLIC PNL TOTAL CA: CPT | Performed by: HOSPITALIST

## 2020-05-29 PROCEDURE — 99239 HOSP IP/OBS DSCHRG MGMT >30: CPT | Performed by: HOSPITALIST

## 2020-05-29 PROCEDURE — 25000128 H RX IP 250 OP 636: Performed by: INTERNAL MEDICINE

## 2020-05-29 RX ORDER — POLYETHYLENE GLYCOL 3350 17 G/17G
17 POWDER, FOR SOLUTION ORAL 2 TIMES DAILY
Qty: 60 PACKET | Refills: 0 | Status: SHIPPED | OUTPATIENT
Start: 2020-05-29 | End: 2020-07-08

## 2020-05-29 RX ORDER — POTASSIUM CHLORIDE 1500 MG/1
20 TABLET, EXTENDED RELEASE ORAL ONCE
Status: COMPLETED | OUTPATIENT
Start: 2020-05-29 | End: 2020-05-29

## 2020-05-29 RX ORDER — POTASSIUM CHLORIDE 750 MG/1
10 TABLET, EXTENDED RELEASE ORAL 2 TIMES DAILY
Qty: 60 TABLET | Refills: 0 | Status: SHIPPED | OUTPATIENT
Start: 2020-05-29 | End: 2020-08-19

## 2020-05-29 RX ORDER — FUROSEMIDE 40 MG
40 TABLET ORAL
Status: DISCONTINUED | OUTPATIENT
Start: 2020-05-29 | End: 2020-05-29 | Stop reason: HOSPADM

## 2020-05-29 RX ORDER — FUROSEMIDE 40 MG
40 TABLET ORAL
Qty: 60 TABLET | Refills: 0 | Status: SHIPPED | OUTPATIENT
Start: 2020-05-29 | End: 2020-06-05

## 2020-05-29 RX ADMIN — POTASSIUM CHLORIDE 10 MEQ: 750 TABLET, EXTENDED RELEASE ORAL at 08:35

## 2020-05-29 RX ADMIN — INSULIN GLARGINE 10 UNITS: 100 INJECTION, SOLUTION SUBCUTANEOUS at 08:36

## 2020-05-29 RX ADMIN — FUROSEMIDE 40 MG: 10 INJECTION, SOLUTION INTRAMUSCULAR; INTRAVENOUS at 06:26

## 2020-05-29 RX ADMIN — POTASSIUM CHLORIDE 20 MEQ: 1500 TABLET, EXTENDED RELEASE ORAL at 10:26

## 2020-05-29 RX ADMIN — UMECLIDINIUM 1 PUFF: 62.5 AEROSOL, POWDER ORAL at 08:42

## 2020-05-29 RX ADMIN — APIXABAN 2.5 MG: 2.5 TABLET, FILM COATED ORAL at 08:35

## 2020-05-29 RX ADMIN — FUROSEMIDE 40 MG: 40 TABLET ORAL at 08:35

## 2020-05-29 ASSESSMENT — ACTIVITIES OF DAILY LIVING (ADL)
ADLS_ACUITY_SCORE: 13

## 2020-05-29 NOTE — PROGRESS NOTES
Care Coordination:    Noted pt has discharge orders including recommendation for followup with PCP.  Follow up with primary care provider, Senthil Moya, within 7 days to evaluate medication change and for hospital follow- up.  The following labs/tests are recommended: BMP in 4-6 days.     Added appointment to AVS:  Appointment scheduled at your Primary Care Clinic:   Tuesday June 2nd at 12:30 PM with Dr. Finch   *bring these papers with you, wear a mask if you have one     Lore Melendez RN, BSN, PHN  [facility]   Mobile: [phone]

## 2020-05-29 NOTE — PLAN OF CARE
Pt is A+Ox4, VSS. Pt up independently. BG monitored and were stable overnight. Voiding adequately. Lasix given this AM. Waiting for patient weight to go down. Some swelling still present in lower extremities.

## 2020-05-29 NOTE — PLAN OF CARE
A&O x 4. VSS on room air. independent. Mod carb diet, tolerating well. PIV removed for discharge. Denies pain, SOB. BG management continues. Plurex cath dressing WDL, to be drained tomorrow. Voiding spontaneously. BS active, had BM this AM. Adequate for discharge.     Discharge instructions reviewed with patient. All questions answered. Belongings returned at discharge. Medications provided. Patient left via wheelchair to discharge home with wife via car.

## 2020-05-29 NOTE — DISCHARGE SUMMARY
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 If you have not been   contacted within the week please call  [phone]  Endocrinology of [address] [place]  [place]         Follow-up and recommended labs and tests       Follow up with primary care provider, Senthil Moya, within 7 days to   evaluate medication change and for hospital follow- up.  The following   labs/tests are recommended: BMP in 4-6 days.               Unresulted Labs Ordered in the Past 30 Days of this Admission     Date and Time Order Name Status Description                These results will be followed up by none    Discharge Disposition   Discharged to home with HH RN and PT  Condition at discharge: Stable    Hospital Course   Tc Garcia is a 80 year old male who was admitted on 5/21/2020.  Patient was recently discharged from [facility] following a stay from 5/10 to 5/14.  He is currently admitted for following DKA.  He had a left Pleurx catheter placed during his prior admission for recurrent pleural effusion     DKA due to lack of insulin in his insulin pump  Some concern with cognition because upon restart of his pumon on 5/22, he did develop hyperglycemia followed by hypoglylcemia. SLUMS screen was negative. So Pump was taken out.   - Managed with basal lantus, premeal and ISS novolog insulin. BS fairly stable. Patient felt comfortable injecting insulin.  And so discharging on lantus 10 units daily, novolog 8 units with breakfast, and lunch and 4 units with supper. He tends to drop BS during night. Advised to check BS at HS and light snacks at HS as well.   -also advised to make log of his BS until PCP follow up so his insulin can be adjusted accordingly on follow up visit.     Shortness of breath multifactorial due to recurrent transudative pleural effusion s/p pleurx catheter placement, pulmonary hypertension, and HFpEF  Chest CT revealed no pneumonia, improvement in the pleural effusion with pleurx drainage  COVID negative  CT without pneumonia and abx stopped  - prn pleurx drainage - drained 5/28  -  IV lasix transitioned to PO per cardiology, appreciate assistance.   - holding losartan/hctz.   - prn hydralazine  - large disconnect between what medications the cardiologist are titrating and what medications the patient is truly taking. So he needs a home RN at home to help with med management. Of note, his SLUMS was normal. I wonder if the home hypoglycemia is leading to intermittent confusion, difficulty with med management, etc. Home health RN to review medications after discharge and to make sure he is taking them per instruction.      Chronic atrial fibrillation and flutter  - continue apixiban and verapamil.     Hypokalemia  - Replaced. Since he is on lasix, and needed replacement consistently, discharging on PO potassium despite CKD. Needs close monitoring as outpatient. BMP in 4-6 days.     Consultations This Hospital Stay   SPEECH LANGUAGE PATH ADULT IP CONSULT  PHYSICAL THERAPY ADULT IP CONSULT  OCCUPATIONAL THERAPY ADULT IP CONSULT  CARE COORDINATOR IP CONSULT  CARDIOLOGY IP CONSULT  OCCUPATIONAL THERAPY ADULT IP CONSULT  SPEECH LANGUAGE PATH ADULT IP CONSULT  CARE TRANSITION RN/SW IP CONSULT    Code Status   Full Code    Time Spent on this Encounter   Anny HALEY MD, personally saw the patient today and spent greater than 30 minutes  discharging this patient.       Anny Beach MD  [facility]  ______________________________________________________________________    Physical Exam   Vital Signs: Temp: 98.1  F (36.7  C) Temp src: Oral BP: 131/80 Pulse: 78 Heart Rate: 78 Resp: 14 SpO2: 97 % O2 Device: None (Room air)    Weight: 152 lbs 15.99 oz     Constitutional: Awake, alert. Up in bed,  Co operative, very pleasant. No apparent distress   HEENT: PERRLA EOMI  Respiratory: better air entry on left base, lungs clear, Pleurx catheter noted on left.  Cardiovascular: S1S2 irregular, systolic Murmur (+)  GI: Normal bowel sounds, soft, non-distended, non-tender  Skin/Integumen: No rashes, no cyanosis,ectremity edema (+)  Neuro : moving all 4 extremities, no focal deficit noted.       Primary Care Physician   Senthil Moya    Discharge Orders      Home care nursing referral      Home Care PT Referral for Hospital Discharge      Follow-up and recommended labs and tests     Endocrine Follow up  The Clinic will call you to schedule an appointment. If you have not been contacted within the week please call  [phone]  Endocrinology of [address]     Reason for your hospital stay    DKA   Fluid overload.     Follow-up and recommended labs and tests     Follow up with primary care provider, Senthil Moya, within 7 days to evaluate medication change and for hospital follow- up.  The following labs/tests are recommended: BMP in 4-6 days.     Activity    Your activity upon discharge: activity as tolerated     Full Code     Diet    Follow this diet upon discharge: Orders Placed This Encounter      Moderate Consistent Carbohydrate Diet       Significant Results and Procedures   Most Recent 3 CBC's:  Recent Labs   Lab Test 05/25/20  0707 05/24/20  0627 05/23/20  0555   WBC 10.4 12.3* 12.0*   HGB 8.9* 9.0* 8.4*   MCV 85 85 85    348 360     Most Recent 3 BMP's:  Recent Labs   Lab Test 05/29/20  0833  05/28/20  0747 05/27/20  0730    133 133   POTASSIUM 3.3* 3.1* 3.0*   CHLORIDE 98 98 98   CO2 PENDING 29 29   BUN PENDING 12 15   CR PENDING 1.41* 1.40*   ANIONGAP PENDING 6 6   LLOYD PENDING 7.8* 8.3*   GLC PENDING 233* 341*     Most Recent 3 BNP's:  Recent Labs   Lab Test 05/21/20  1302 05/10/20  2207 12/30/19  1714   NTBNPI 5,560* 7,143* 3,634*     Most Recent 6 Bacteria Isolates From Any Culture (See EPIC Reports for Culture Details):  Recent Labs   Lab Test 05/21/20  1916 05/21/20  1423 05/12/20  1058 12/31/19  1008 11/07/19  1105 10/31/19  2144   CULT No growth No growth  No growth No growth No growth 10,000 to 50,000 colonies/mL  Candida albicans / dubliniensis  Candida albicans and Candida dubliniensis are not routinely speciated  Susceptibility testing not routinely done  * No growth     Most Recent TSH and T4:  Recent Labs   Lab Test 05/12/20  0541 04/25/20  1228   TSH 3.47 4.39*   T4  --  1.51*     Most Recent 6 glucoses:  Recent Labs   Lab Test 05/29/20  0833 05/28/20  0747 05/27/20  0730 05/26/20  0857 05/25/20  0707 05/24/20  0627   GLC PENDING 233* 341* 331* 163* 198*   ,   Results for orders placed or performed during the hospital encounter of 05/21/20   XR Chest Port 1 View    Narrative    CHEST ONE VIEW  5/21/2020 1:30 PM     HISTORY: Shortness of breath, recent PleurX catheter placement.    COMPARISON: May 10, 2020      Impression    IMPRESSION: Decreased fluid and associated atelectasis and/or  infiltrate on the left compared to previous. Multiple minimally  displaced rib fractures again evident. Right lung clear. No  pneumothorax.    ALLYSON MARTINEZ MD   XR Abdomen Port 1 View    Narrative    ABDOMEN ONE VIEW PORTABLE  5/21/2020 1:45 PM     HISTORY: Vomiting, 1 view.    COMPARISON: None.       Impression    IMPRESSION: Moderate to large amount of stool. Nonobstructed bowel gas  pattern.    ALLYSON MARTINEZ MD   CT Chest w/o Contrast    Narrative    CT CHEST W/O CONTRAST 5/22/2020 4:01 PM      HISTORY: Pneumonia, unresolved or complicated    TECHNIQUE: CT scan of the chest was performed without IV contrast.  Multiplanar reformats were obtained. Dose reduction techniques were  used.  CONTRAST: None.  COMPARISON: 5/11/2020    FINDINGS:     LUNGS AND PLEURA: Left pleural effusion has significantly decreased in  size when compared to previous, status post Pleurx catheter placement.  There is only a small residual amount of pleural fluid. There is  rounded atelectasis in the left lower lobe and scarring or atelectasis  in the lingula. No definite pneumonia. There is a small, freely  layering right pleural effusion, also decreased from previous. Few  calcified pleural plaques noted. Previously seen groundglass  infiltrates in the right upper lobe have nearly completely resolved,  with only minimal residual groundglass opacity in the periphery of the  right apex on series 3 image 11.    MEDIASTINUM/AXILLAE: Calcified mediastinal and left hilar lymph nodes  compatible with old granulomatous disease. Severe coronary artery  calcification. No lymphadenopathy.    UPPER ABDOMEN: No acute findings.    MUSCULOSKELETAL: Multiple old bilateral rib fractures again seen. Some  of the left rib fractures are moderately displaced and ununited. No  change from previous.      Impression    IMPRESSION:  1.  Left pleural effusion has nearly completely resolved status post  placement of Pleurx catheter.  2.  Areas of rounded atelectasis and scarring in the left lung. No  definite pneumonia.  3.  On the prior study there were extensive groundglass infiltrates in  the right upper lobe. These have nearly completely resolved. A small  right pleural effusion is smaller.    CEDRICK GERMAIN MD       Discharge Medications   Current Discharge Medication List      START taking these medications    Details   insulin aspart (NOVOLOG PEN) 100 UNIT/ML pen Inject 8 units subcutaneous with breakfast, 8 units subcutaneous with lunch and 4  units with supper.  Qty: 3 mL, Refills: 0    Comments: Future refills by PCP Dr. Senthil Moya with phone number [phone].  Associated Diagnoses: Diabetic ketoacidosis without coma associated with type 1 diabetes mellitus (H); Diabetic ketoacidosis without coma associated with diabetes mellitus due to underlying condition (H)      insulin glargine (LANTUS PEN) 100 UNIT/ML pen Inject 10 Units Subcutaneous At Bedtime  Qty: 3 mL, Refills: 0    Comments: If Lantus is not covered by insurance, may substitute Basaglar at same dose and frequency.    Associated Diagnoses: Diabetic ketoacidosis without coma associated with diabetes mellitus due to underlying condition (H)      polyethylene glycol (MIRALAX) 17 g packet Take 17 g by mouth 2 times daily  Qty: 60 packet, Refills: 0    Associated Diagnoses: Constipation, unspecified constipation type      potassium chloride ER (KLOR-CON M) 10 MEQ CR tablet Take 1 tablet (10 mEq) by mouth 2 times daily  Qty: 60 tablet, Refills: 0    Associated Diagnoses: Hypokalemia         CONTINUE these medications which have CHANGED    Details   furosemide (LASIX) 40 MG tablet Take 1 tablet (40 mg) by mouth 2 times daily  Qty: 60 tablet, Refills: 0    Associated Diagnoses: Recurrent pleural effusion on left         CONTINUE these medications which have NOT CHANGED    Details   acetaminophen (TYLENOL) 325 MG tablet Take 2 tablets (650 mg) by mouth every 4 hours as needed for mild pain  Qty:  , Refills: 0    Associated Diagnoses: Pain      apixaban ANTICOAGULANT (ELIQUIS) 5 MG tablet Take 1/2 tablet (2.5mg) by mouth twice daily. You will have your labs checked on 5/4/20 and your PCP will direct you when it is safe to increase your dose back to 1 tablet (5mg) twice daily.    Associated Diagnoses: Chronic atrial fibrillation      LOVASTATIN 20 MG PO TABS 1 TABLET DAILY AT DINNER  Qty: 90 Tab, Refills: 0    Associated Diagnoses: Mixed hyperlipidemia      nystatin (MYCOSTATIN) 540938 UNIT/GM  external cream Apply topically 2 times daily as needed       tiotropium (SPIRIVA) 18 MCG inhaled capsule Inhale 18 mcg into the lungs daily      verapamil ER (VERELAN) 240 MG 24 hr capsule Take 1 capsule (240 mg) by mouth At Bedtime    Associated Diagnoses: Atrial fibrillation, unspecified type (H)      glucagon 1 MG kit 1 mg as needed for low blood sugar         STOP taking these medications       insulin aspart (NovoLOG) inject - to fill ambulatory pump Comments:   Reason for Stopping:         INSULIN PUMP - OUTPATIENT Comments:   Reason for Stopping:         losartan-hydrochlorothiazide (HYZAAR) 100-25 MG tablet Comments:   Reason for Stopping:         torsemide (DEMADEX) 10 MG tablet Comments:   Reason for Stopping:             Allergies   Allergies   Allergen Reactions     No Known Drug Allergies

## 2020-05-29 NOTE — PLAN OF CARE
Alert and oriented x4. Vital signs stable on room air. Up independently. Tolerating mod carb diet. Lung sounds clear. Bowel sounds active, + flatus, BM today. Adequate urine output. Bruising and edema to inside of right arm marked, not extended. Edema to BLE. Preventative mepilex to coccyx changed - blanchable erythema, CDI. Denies pain and nausea. Tele a-fib CVR. 700 mL drained from PluerX per orders, new dressing CDI. Insulin held with dinner due to low sugars, sugars remained low - 5 marlyn cracker packets, 1 pudding, and 1 apple juice given.

## 2020-05-29 NOTE — DISCHARGE INSTRUCTIONS
-The patient will discharge home with [facility].  [facility] will contact the patient directly to arrange the first visit.  [facility]'s phone number is [phone].

## 2020-06-01 ENCOUNTER — TELEPHONE (OUTPATIENT)
Dept: CARDIOLOGY | Facility: CLINIC | Age: 80
End: 2020-06-01

## 2020-06-01 ENCOUNTER — CARE COORDINATION (OUTPATIENT)
Dept: CARDIOLOGY | Facility: CLINIC | Age: 80
End: 2020-06-01

## 2020-06-01 NOTE — TELEPHONE ENCOUNTER
Patient was evaluated by cardiology while inpatient for DKA secondary to running out of insulin for his insulin pump and acute on chronic HF. PMH: DM,PF, HTN, recent new dx of pHTN-seeing Dr. Bonilla for this.  IV Lasix diuresed and restarted on PTA po Lasix at an increased dosage. Called patient to discuss any post hospital d/c questions, review discharge medication, and confirm f/u appts. Patient denied any questions regarding new or changes to PTA medications. Patient denied any SOB, chest pain, edema, or light headedness. RN confirmed with patient that he has an in office apt scheduled with TAMAR Katlin Fontanez on 6/5/20, with labs prior at our [place] office. Instructed patient to weigh self every AM, after waking and using the restroom, but before breakfast and medications. Call clinic for a weight gain of 2 lbs overnight or 5 lbs in a week. Low Na+ diet encouraged. Pt instructed to bring daily wt/BP diary and medications with to f/u OV. Patient advised to call clinic with any cardiac related questions or concerns prior to his appt. Patient verbalized understanding and agreed with plan. KEYUR Agarwal RN.

## 2020-06-01 NOTE — PROGRESS NOTES
Care coordination of visit to In-Person 6/5/2020 310 with labs at [address] [place] location at request of Katlin BUCIO.  Corin Noble RN 06/01/20 12:20 PM

## 2020-06-01 NOTE — PROGRESS NOTES
[facility]    Reviewed chart re: hospitalization, need for CORE follow-up. Pt discharged on 5/29. Pt has follow-up this week with Katlin Fontanez PA-C.     Future Appointments   Date Time Provider Department Center   6/5/2020 11:00 AM [facility]   6/5/2020  3:10 PM Katlin Fontanez PA [facility]     Katlin to manage both PH and CORE.     BAYRON VanN, RN, PHN, HNB-BC   6/1/2020 at 7:37 AM

## 2020-06-04 ENCOUNTER — TELEPHONE (OUTPATIENT)
Dept: CARDIOLOGY | Facility: CLINIC | Age: 80
End: 2020-06-04

## 2020-06-04 NOTE — PROGRESS NOTES
Cardiology Progress Note    Date of Service: 06/05/2020      Reason for visit: Follow up pulmonary hypertension, HFpEF.    Cardiology team: Dr. Julien (general), Dr. Camejo (EP), Dr. Bonilla (PH), Annette Mejía PA-C ([facility])      HPI:  Mr. Garcia is an 80 year old male with a PMhx including poorly controlled diabetes, HTN, chronic anemia, recurrent left pleural effusion, paroxysmal atrial fibrillation/flutter, and HFpEF with pulmonary hypertension. He was hospitalized in early May with progressive dyspnea and due to recurrent (transudative) pleural effusion a left PleurX catheter was placed. He was diuresed with IV furosemide and given empiric abx therapy. Echo showed normal LVEF 55-60% with normal wall motion. The RV was moderately dilated with decreased systolic function and mild TR. He underwent a RHC during that stay that showed mild PH (PA pressure of 28 mmHg (51/15), mean RA of 4mmHg, and mildly elevated filling pressures with a wedge of 12mmHg (Vwave 16). PVR was 3.9 Wood units and Carlos Manuel CO/CI was 4.09/2.23. With vasodilator challenge, his PVR normalized to 1.22 wood units. Therefore, he was deemed to have pulmonary hypertension out of proportion to his left heart disease. He was started on Cialis 5mg daily but because this was not a formulated preparation, was switched to sildenafil 20 mg 3 times daily and discharged home. Interestingly, he was not discharged on any diuretic therapy. Discharge wt was 152 lbs. His admission was complicated by atrial flutter for which he was given amiodarone but because of prolongation in QTc, this was discontinued, and he was continued on his PTA verapamil.       Following discharge he developed symptomatic hypotension and via phone was told to stop the sildenafil. He then saw Dr. Chad gusman for PH evaluation a few days later on 5/18. He reported hypertension with SBP >200mmHg and weight was up 12 lbs from discharge. Torsemide 10 mg daily, Losartan 25 mg daily, and KCL 40 mEq  "were all started. She recommended resuming sildenafil once he was felt to be euvolemic. He was referred to [facility] for an in person visit for close monitoring in attempt to prevent readmission. He was to see Ame Mejía PA-C on 5/21. He called to cancel the appt because he wasn't feeling well (nausea) and thus instead a phone visit was conducted. She had a difficult time assessing him via phone, though reported losing 10 lbs at home at 155 lbs at that time. Ame agreed that an in -person visit was necessary in order to adequately assess him.     However, later that day he presented back to the ED with shortness of breath and having run out of insulin in his bump. He was hospitalized from 5/21 to 5/29. There was concern regarding his overall cognition vs confusion due to fluctuating blood sugars. It was noted that the medications he was truly taking were much different than what we had been attempting to adjust. His insulin pump was removed and regimen adjusted. A repeat chest CT showed no pneumonia and improvement in his effusions since placement of his pleurex. He again required IV lasix and was discharged on Lasix 40mg BID in place of the torsemide. Due to renal concerns, his hydrochlorothiazide and losartan were held. Fortunately, home health was arranged to help him manage his medications at home.     Today, for close follow up, I am seeing him in person in the [facility]. Today, his weight on our scale is down to 148 lbs (dry weight felt to be likely 150-155 lbs). Despite the significant weight loss over the last several weeks, he says his swelling is \"about the same.\" He does have some ongoing mild edema but says he usually wears compression socks which help. However, upon questioning he tells me he feels \"1000% better\" than before he was in the hospital. His BP is up a bit today but tells me at home it runs a bit lower. The home health nurse is coming frequently to help with his medications and he " endorses compliance. He denies any chest discomfort or palpitations. He says his blood sugars continue to fluctuate a lot (as high as 300-400s) and he feels bad when it drops below 180. In fact, his sensor reported a BS of 175 during our visit and he requested some juice as he hadn't eaten lunch before our visit. He is draining his pleurex at home and said this morning he got ~600cc out. His breathing fluctuates, and he tells me he talked to his pulmonologist who is going to give him some new inhalers. Labs today prior to our visit showed a slight increase in his SCr to 1.66 (baseline reported ~1.3, was 1.42 at discharge), though electrolytes are under good control.        Assessment/Plan:    1. Acute on chronic HFpEF.   --Overall, his situation has been complex. His most recent echo from May 2020 shows preserved EF 55-60%, with normal wall motion. RV was mild to moderately dilated with mildly decreased RV function. RHC during his hospital stay in May as below.    --Today, his weight is down quite a bit to 148 lbs, and dry weight felt likely to be around 150-152 lbs. As his SCr has trended up, will cautiously reduce his Lasix to 40mg once daily (from BID). I've asked him to continue to weigh daily at home and call with an upward trend or worsening swelling. He should continue compression socks and a reduced sodium diet. He tells me he has an appt with nephrology next week. If acceptable from a renal standpoint, would have a low threshold to go back up on Lasix if necessary.    --His management is complicated by his fluctuating blood sugars and confusion about his medicaitons. I asked him to be sure to call to make an appt with endocrine or his PCP in the near future for further adjustments. Fortunately, he now has a home health nurse to assist with medication preparation so hopefully this will help in ongoing management.     2. Pulmonary hypertension.   --RHC in May 2020 showed PA pressure of 28 mmHg (51/15), mean  RA of 4mmHg, and mildly elevated filling pressures with a wedge of 12mmHg (Vwave 16). PVR was 3.9 Wood units and Carlos Manuel CO/CI was 4.09/2.23. With vasodilator challenge, his PVR normalized to 1.22 wood units. Therefore, he was deemed to have pulmonary hypertension out of proportion to his left heart disease.   --As per Dr. Bonilla's recommendations, now that he is euvolemic, will slowly reintroduce sildenafil. Will begin at 20 mg once daily and gradually up-titrate at 2 or 3-week intervals to b.i.d. and then 3 weeks later to t.i.d. Our nurse will reach out to him early next week to coordinate this with him, as he may require additional paperwork to get new insurance approval.    --Will need to watch closely for recurrent hypotension or worsening edema while on pulmonary vasodilators.    --In the future, when felt safe from a COVID standpoint, he could benefit from pulmonary rehab.     3. Paroxysmal atrial fibrillation/flutter.   --Heart rates under good control today and on exam he appears to remain in sinus rhythm. He is not currently on beta blockers, and remains on verapamil which I believe has been longstanding for him. Had a prolonged QTC on amiodarone in the past.    --He is on anticoagulation with apixaban 2.5mg BID due to age and renal function.     4. Hypertension.   --BP up a bit today but he reports somewhat lower at home. This may come down with initiation of sildenafil as above, so I am hesitant to add anything new for this. We could potentially reintroduce losartan in the future if renal function allows.       Follow up plan: Will try to arrange for return in person visits with myself or Ame Mejía PA-C in 2 weeks and again in 4 weeks with labs, in attempts to prevent hospital readmission.       Orders this Visit:  Orders Placed This Encounter   Procedures     Basic metabolic panel     N terminal pro BNP outpatient     Follow-Up with [facility] visit     Follow-Up with [facility] visit      Orders Placed This Encounter   Medications     furosemide (LASIX) 40 MG tablet     Sig: Take 1 tablet (40 mg) by mouth daily     Dispense:  60 tablet     Refill:  0     Medications Discontinued During This Encounter   Medication Reason     furosemide (LASIX) 40 MG tablet          CURRENT MEDICATIONS:  Current Outpatient Medications   Medication Sig Dispense Refill     acetaminophen (TYLENOL) 325 MG tablet Take 2 tablets (650 mg) by mouth every 4 hours as needed for mild pain  0     apixaban ANTICOAGULANT (ELIQUIS) 5 MG tablet Take 1/2 tablet (2.5mg) by mouth twice daily. You will have your labs checked on 5/4/20 and your PCP will direct you when it is safe to increase your dose back to 1 tablet (5mg) twice daily.       furosemide (LASIX) 40 MG tablet Take 1 tablet (40 mg) by mouth daily 60 tablet 0     glucagon 1 MG kit 1 mg as needed for low blood sugar       insulin aspart (NOVOLOG PEN) 100 UNIT/ML pen Inject 8 units subcutaneous with breakfast, 8 units subcutaneous with lunch and 4 units with supper. 3 mL 0     insulin glargine (LANTUS PEN) 100 UNIT/ML pen Inject 10 Units Subcutaneous every morning 3 mL 0     LOVASTATIN 20 MG PO TABS 1 TABLET DAILY AT DINNER 90 Tab 0     nystatin (MYCOSTATIN) 924803 UNIT/GM external cream Apply topically 2 times daily as needed        potassium chloride ER (KLOR-CON M) 10 MEQ CR tablet Take 1 tablet (10 mEq) by mouth 2 times daily 60 tablet 0     tiotropium (SPIRIVA) 18 MCG inhaled capsule Inhale 18 mcg into the lungs daily       verapamil ER (VERELAN) 240 MG 24 hr capsule Take 1 capsule (240 mg) by mouth At Bedtime       polyethylene glycol (MIRALAX) 17 g packet Take 17 g by mouth 2 times daily (Patient not taking: Reported on 6/5/2020) 60 packet 0       ALLERGIES     Allergies   Allergen Reactions     No Known Drug Allergies        PAST MEDICAL HISTORY:  Past Medical History:   Diagnosis Date     Anemia      CKD (chronic kidney disease) stage 3, GFR 30-59 ml/min (H)       Fall 2019    suffered multiple left rib fractures, C3 and T2 fractures     Paroxysmal atrial fibrillation (H)      Personal history of colonic polyps     gets colonoscopy every five years, due in      Pleural effusion on left 2019    after multiple rib fractures     Rosacea      Type II or unspecified type diabetes mellitus without mention of complication, not stated as uncontrolled      Unspecified essential hypertension        PAST SURGICAL HISTORY:  Past Surgical History:   Procedure Laterality Date     ANESTHESIA CARDIOVERSION N/A 2/3/2020    Procedure: ANESTHESIA, FOR CARDIOVERSION;  Surgeon: GENERIC ANESTHESIA PROVIDER;  Location:  OR     CV RIGHT HEART CATH N/A 2020    Procedure: Right Heart Cath;  Surgeon: Senthil Silva MD;  Location:  HEART CARDIAC CATH LAB     HC REMOVE TONSILS/ADENOIDS,<13 Y/O      T & A <11y.o.     IR CHEST TUBE DRAIN TUNNELED LEFT  2020       FAMILY HISTORY:  Family History   Problem Relation Age of Onset     Family History Negative Mother          age 71     Family History Negative Father          age 70     Diabetes Brother         alive age 77     Diabetes Brother         alive age 76     Family History Negative Brother              Family History Negative Brother              Diabetes Sister         alive age76     Family History Negative Sister          age 86     Heart Disease Sister              Family History Negative Sister              Family History Negative Sister              Diabetes Sister      Diabetes Sister      Diabetes Brother      Diabetes Brother        SOCIAL HISTORY:  Social History     Socioeconomic History     Marital status:      Spouse name: Lianna     Number of children: 4     Years of education: 16     Highest education level: None   Occupational History     Occupation: COURRIER     Employer: [employer]    Social Needs      Financial resource strain: None     Food insecurity     Worry: None     Inability: None     Transportation needs     Medical: None     Non-medical: None   Tobacco Use     Smoking status: Former Smoker     Packs/day: 0.20     Years: 40.00     Pack years: 8.00     Types: Cigarettes     Last attempt to quit: 2008     Years since quittin.4     Smokeless tobacco: Never Used     Tobacco comment: occasional   Substance and Sexual Activity     Alcohol use: Not Currently     Alcohol/week: 35.0 standard drinks     Drug use: Not Currently     Sexual activity: None   Lifestyle     Physical activity     Days per week: None     Minutes per session: None     Stress: None   Relationships     Social connections     Talks on phone: None     Gets together: None     Attends [employer]ism service: None     Active member of club or organization: None     Attends meetings of clubs or organizations: None     Relationship status: None     Intimate partner violence     Fear of current or ex partner: None     Emotionally abused: None     Physically abused: None     Forced sexual activity: None   Other Topics Concern     Parent/sibling w/ CABG, MI or angioplasty before 65F 55M? Not Asked   Social History Narrative     None       Review of Systems:  Cardiovascular: negative for chest pain, palpitations, orthopnea, pos LE edema (unchanged)  Constitutional: negative for chills, sweats, fevers. Pos fatigue.   Resp: Negative for dyspnea at rest, pos dyspnea on exertion (better), neg cough, wheezing, pos known lung disease/recurrent pleural effusion.   HEENT: Negative for new visual changes, frequent headaches. Pos dizziness with BS fluctuations.  Gastrointestinal: negative for abdominal pain, diarrhea, pos occasional nausea. Neg vomiting  Hematologic/lymphatic: pos for current systemic anticoagulation  Musculoskeletal: pos back pain  Neurological: negative for focal weakness, LOC, seizures, syncope      Physical Exam:  Vitals: BP (!) 149/82  "(BP Location: Left arm, Patient Position: Sitting, Cuff Size: Adult Regular)   Pulse 78   Ht 1.753 m (5' 9\")   Wt 67.5 kg (148 lb 12.8 oz)   SpO2 100%   BMI 21.97 kg/m     Wt Readings from Last 4 Encounters:   06/05/20 67.5 kg (148 lb 12.8 oz)   05/29/20 69.4 kg (153 lb)   05/21/20 70.3 kg (155 lb)   05/18/20 74.4 kg (164 lb)       GEN:  In general, this is a well nourished elderly male in no acute distress on room air.  Patient ambulatory, unaccompanied.   HEENT:  Pupils equal, round. Sclerae nonicteric.   NECK: Supple, no masses appreciated. Trachea midline. No significant JVD while upright.   C/V:  Regular rate and rhythm, 1/6 ELIZABETH heard over LSB. No rub or gallop. Mildly accentuated P2 but no S3 or RV heave.   RESP: Respirations are unlabored. No use of accessory muscles. Somewhat diminished left base but no active wheezing, or rhonchi.  GI: Abdomen soft, nontender, not significantly distended.   EXTREM: Trace to 1+ ankle bilateral edema. No cyanosis or clubbing.  NEURO: Alert and oriented, cooperative. Gait not assessed. No obvious focal deficits.   PSYCH: Normal affect during my visit, seemed to answer questions appropriately.   SKIN: Warm and dry. No rashes or petechiae appreciated.     Recent Lab Results:  LIPID RESULTS:  Lab Results   Component Value Date    CHOL 116 05/13/2020    HDL 60 05/13/2020    LDL 43 05/13/2020    TRIG 65 05/13/2020             LIVER ENZYME RESULTS:  Lab Results   Component Value Date    AST 27 05/21/2020    ALT 24 05/21/2020       CBC RESULTS:  Lab Results   Component Value Date    WBC 10.4 05/25/2020    RBC 3.36 (L) 05/25/2020    HGB 8.9 (L) 05/25/2020    HCT 28.6 (L) 05/25/2020    MCV 85 05/25/2020    MCH 26.5 05/25/2020    MCHC 31.1 (L) 05/25/2020    RDW 18.0 (H) 05/25/2020     05/25/2020       BMP RESULTS:  Lab Results   Component Value Date     06/05/2020    POTASSIUM 4.0 06/05/2020    CHLORIDE 101 06/05/2020    CO2 29 06/05/2020    ANIONGAP 9 06/05/2020    "  (H) 06/05/2020    BUN 21 06/05/2020    CR 1.66 (H) 06/05/2020    GFRESTIMATED 38 (L) 06/05/2020    GFRESTBLACK 44 (L) 06/05/2020    LLOYD 8.9 06/05/2020        Additional pertinent testing:  Echo 5/11/2020     Interpretation Summary     1. Left ventricular systolic function is normal. The visual ejection fraction  is estimated at 55-60%.  2. No regional wall motion abnormalities noted.  3. The right ventricle is mild to moderately dilated. Mildly decreased right  ventricular systolic function  4. There is mild (1+) tricuspid regurgitation.  5. Moderate pulmonary hypertension; The right ventricular systolic pressure is  approximated at 47mmHg plus the right atrial pressure. IVC diameter and  respiratory changes fall into an intermediate range suggesting an RA pressure  of 8 mmHg.  6. Left pleural effusion is present.  7. In comparison with the prior report, estimated pulmonary pressures have  increased somewhat, otherwise, no significant change    Greater than 40 minutes was spent with this patient, >50% of which was spent in counseling and coordination of care.       Katlin Fontanez PA-C  [facility]  Pager [phone]

## 2020-06-04 NOTE — TELEPHONE ENCOUNTER
Wellness Screening Tool    Symptom Screening:    Do you have one of the following NEW symptoms:      Fever (subjective or >100.0)?  No    New cough? No    Shortness of breath? No    Chills? No    New loss of taste or smell? No    Generalized body aches? No    New persistent headache? No    New sore throat? No    Nausea, vomiting or diarrhea? No    Within the past 3 weeks, have you been exposed to someone with a known positive illness below?      COVID - 19 (known or suspected) No    Chicken pox?  No    Measles? No    Pertussis? No          Patient notified of visitor restriction: Yes  Patient informed to wear a mask: Yes    Patient's appointment status: Patient will be seen in clinic as scheduled on 6/5/2020

## 2020-06-05 ENCOUNTER — TRANSFERRED RECORDS (OUTPATIENT)
Dept: HEALTH INFORMATION MANAGEMENT | Facility: CLINIC | Age: 80
End: 2020-06-05

## 2020-06-05 ENCOUNTER — OFFICE VISIT (OUTPATIENT)
Dept: CARDIOLOGY | Facility: CLINIC | Age: 80
End: 2020-06-05
Attending: PHYSICIAN ASSISTANT
Payer: COMMERCIAL

## 2020-06-05 ENCOUNTER — TELEPHONE (OUTPATIENT)
Dept: CARDIOLOGY | Facility: CLINIC | Age: 80
End: 2020-06-05

## 2020-06-05 VITALS
DIASTOLIC BLOOD PRESSURE: 82 MMHG | OXYGEN SATURATION: 100 % | HEART RATE: 78 BPM | WEIGHT: 148.8 LBS | SYSTOLIC BLOOD PRESSURE: 149 MMHG | BODY MASS INDEX: 22.04 KG/M2 | HEIGHT: 69 IN

## 2020-06-05 DIAGNOSIS — I50.33 ACUTE ON CHRONIC HEART FAILURE WITH PRESERVED EJECTION FRACTION (HFPEF) (H): ICD-10-CM

## 2020-06-05 DIAGNOSIS — J90 RECURRENT PLEURAL EFFUSION ON LEFT: ICD-10-CM

## 2020-06-05 LAB
ANION GAP SERPL CALCULATED.3IONS-SCNC: 9 MMOL/L (ref 3–14)
BUN SERPL-MCNC: 21 MG/DL (ref 7–30)
CALCIUM SERPL-MCNC: 8.9 MG/DL (ref 8.5–10.1)
CHLORIDE SERPL-SCNC: 101 MMOL/L (ref 94–109)
CO2 SERPL-SCNC: 29 MMOL/L (ref 20–32)
CREAT SERPL-MCNC: 1.66 MG/DL (ref 0.66–1.25)
GFR SERPL CREATININE-BSD FRML MDRD: 38 ML/MIN/{1.73_M2}
GLUCOSE SERPL-MCNC: 209 MG/DL (ref 70–99)
POTASSIUM SERPL-SCNC: 4 MMOL/L (ref 3.4–5.3)
SODIUM SERPL-SCNC: 139 MMOL/L (ref 133–144)

## 2020-06-05 PROCEDURE — 80048 BASIC METABOLIC PNL TOTAL CA: CPT | Performed by: PHYSICIAN ASSISTANT

## 2020-06-05 PROCEDURE — 36415 COLL VENOUS BLD VENIPUNCTURE: CPT | Performed by: PHYSICIAN ASSISTANT

## 2020-06-05 PROCEDURE — 99215 OFFICE O/P EST HI 40 MIN: CPT | Performed by: PHYSICIAN ASSISTANT

## 2020-06-05 RX ORDER — FUROSEMIDE 40 MG
40 TABLET ORAL DAILY
Qty: 60 TABLET | Refills: 0
Start: 2020-06-05 | End: 2020-06-09

## 2020-06-05 ASSESSMENT — MIFFLIN-ST. JEOR: SCORE: 1370.33

## 2020-06-05 NOTE — TELEPHONE ENCOUNTER
----- Message from FORTUNATO Reynoso sent at 6/5/2020  3:39 PM CDT -----  Regarding: update  So I saw him in person today and here is the plan:    1. I reduced his lasix to 40mg once a day from BID because of renal function.  2. He actually looks possibly as dry as we may get him so we will try to reintroduce the sildenafil. I told him you would call him Monday to discuss further, as I'm sure you will have to get approval etc.     Dr Bonilla outlined her plan in her last note:   sildenafil should be started at 20 mg once daily and gradually up-titrated at 2 or 3-week intervals to b.i.d. and then 3 weeks later to t.i.d.     I think we will have to do this slow so he doesn't retain fluid again.     I'd like to get him back for an in person visit in [place] in ~2 weeks if possible for close monitoring. I've sent a message to scheduling.     Thanks  Katlin    ==============================    Call to patient.  He has one week of sildenafil. In formed patient will initiate sildenafil paperwork. He will not start until directed. Reviewed process and potential costs. Patient to let us know if cost prohibitive.  Will clarify if he needs to be seen in person with Katlin and what dates.   Corin Noble RN 06/05/20204:02 PM

## 2020-06-05 NOTE — PATIENT INSTRUCTIONS
Visit Summary:    Today we discussed:   1. We will REDUCE your Lasix (furosemide) to 40mg ONCE a day (instead of twice daily). Please keep weighing yourself at home, and call if the weight starts to trend back upward or you notice more swelling.  2. We are going to try to restart the medication called sildenafil (Revatio). Our office will reach out to you to discuss this further because it may need to be sent in the mail.    Test results:   Results for CEDRICK PHILLIPS (MRN [id number]) as of 6/5/2020 15:26   Ref. Range 6/5/2020 14:21   Sodium Latest Ref Range: 133 - 144 mmol/L 139   Potassium Latest Ref Range: 3.4 - 5.3 mmol/L 4.0   Chloride Latest Ref Range: 94 - 109 mmol/L 101   Carbon Dioxide Latest Ref Range: 20 - 32 mmol/L 29   Urea Nitrogen Latest Ref Range: 7 - 30 mg/dL 21   Creatinine Latest Ref Range: 0.66 - 1.25 mg/dL 1.66 (H)   GFR Estimate Latest Ref Range: >60 mL/min/1.73_m2 38 (L)   GFR Estimate If Black Latest Ref Range: >60 mL/min/1.73_m2 44 (L)   Calcium Latest Ref Range: 8.5 - 10.1 mg/dL 8.9   Anion Gap Latest Ref Range: 3 - 14 mmol/L 9     Follow up:   With Katlin or Ame in ~2 weeks with repeat labs.     Please call my nurse Corin at [phone] with any questions or concerns.

## 2020-06-05 NOTE — LETTER
6/5/2020    Charlie Finch MD  [address]  [facility] [address]    RE: Tc Garcia       Dear Colleague,    I had the pleasure of seeing Tc Garcia in the [facility].      Cardiology Progress Note    Date of Service: 06/05/2020      Reason for visit: Follow up pulmonary hypertension, HFpEF.    Cardiology team: Dr. Julien (general), Dr. Camejo (EP), Dr. Bonilla (PH), Annette Mejía PA-C ([facility]AMAR)      HPI:  Mr. Garcia is an 80 year old male with a PMhx including poorly controlled diabetes, HTN, chronic anemia, recurrent left pleural effusion, paroxysmal atrial fibrillation/flutter, and HFpEF with pulmonary hypertension. He was hospitalized in early May with progressive dyspnea and due to recurrent (transudative) pleural effusion a left PleurX catheter was placed. He was diuresed with IV furosemide and given empiric abx therapy. Echo showed normal LVEF 55-60% with normal wall motion. The RV was moderately dilated with decreased systolic function and mild TR. He underwent a RHC during that stay that showed mild PH (PA pressure of 28 mmHg (51/15), mean RA of 4mmHg, and mildly elevated filling pressures with a wedge of 12mmHg (Vwave 16). PVR was 3.9 Wood units and Carlos Manuel CO/CI was 4.09/2.23. With vasodilator challenge, his PVR normalized to 1.22 wood units. Therefore, he was deemed to have pulmonary hypertension out of proportion to his left heart disease. He was started on Cialis 5mg daily but because this was not a formulated preparation, was switched to sildenafil 20 mg 3 times daily and discharged home. Interestingly, he was not discharged on any diuretic therapy. Discharge wt was 152 lbs. His admission was complicated by atrial flutter for which he was given amiodarone but because of prolongation in QTc, this was discontinued, and he was continued on his PTA verapamil.       Following discharge he developed symptomatic hypotension and via phone was told to stop the sildenafil.  "He then saw Dr. Chad gusman for PH evaluation a few days later on 5/18. He reported hypertension with SBP >200mmHg and weight was up 12 lbs from discharge. Torsemide 10 mg daily, Losartan 25 mg daily, and KCL 40 mEq were all started. She recommended resuming sildenafil once he was felt to be euvolemic. He was referred to the [facility] for an in person visit for close monitoring in attempt to prevent readmission. He was to see Ame Mejía PA-C on 5/21. He called to cancel the appt because he wasn't feeling well (nausea) and thus instead a phone visit was conducted. She had a difficult time assessing him via phone, though reported losing 10 lbs at home at 155 lbs at that time. Ame agreed that an in -person visit was necessary in order to adequately assess him.     However, later that day he presented back to the ED with shortness of breath and having run out of insulin in his bump. He was hospitalized from 5/21 to 5/29. There was concern regarding his overall cognition vs confusion due to fluctuating blood sugars. It was noted that the medications he was truly taking were much different than what we had been attempting to adjust. His insulin pump was removed and regimen adjusted. A repeat chest CT showed no pneumonia and improvement in his effusions since placement of his pleurex. He again required IV lasix and was discharged on Lasix 40mg BID in place of the torsemide. Due to renal concerns, his hydrochlorothiazide and losartan were held. Fortunately, home health was arranged to help him manage his medications at home.     Today, for close follow up, I am seeing him in person in the [facility]. Today, his weight on our scale is down to 148 lbs (dry weight felt to be likely 150-155 lbs). Despite the significant weight loss over the last several weeks, he says his swelling is \"about the same.\" He does have some ongoing mild edema but says he usually wears compression socks which help. However, upon questioning " "he tells me he feels \"1000% better\" than before he was in the hospital. His BP is up a bit today but tells me at home it runs a bit lower. The home health nurse is coming frequently to help with his medications and he endorses compliance. He denies any chest discomfort or palpitations. He says his blood sugars continue to fluctuate a lot (as high as 300-400s) and he feels bad when it drops below 180. In fact, his sensor reported a BS of 175 during our visit and he requested some juice as he hadn't eaten lunch before our visit. He is draining his pleurex at home and said this morning he got ~600cc out. His breathing fluctuates, and he tells me he talked to his pulmonologist who is going to give him some new inhalers. Labs today prior to our visit showed a slight increase in his SCr to 1.66 (baseline reported ~1.3, was 1.42 at discharge), though electrolytes are under good control.        Assessment/Plan:    1. Acute on chronic HFpEF.   --Overall, his situation has been complex. His most recent echo from May 2020 shows preserved EF 55-60%, with normal wall motion. RV was mild to moderately dilated with mildly decreased RV function. RHC during his hospital stay in May as below.    --Today, his weight is down quite a bit to 148 lbs, and dry weight felt likely to be around 150-152 lbs. As his SCr has trended up, will cautiously reduce his Lasix to 40mg once daily (from BID). I've asked him to continue to weigh daily at home and call with an upward trend or worsening swelling. He should continue compression socks and a reduced sodium diet. He tells me he has an appt with nephrology next week. If acceptable from a renal standpoint, would have a low threshold to go back up on Lasix if necessary.    --His management is complicated by his fluctuating blood sugars and confusion about his medicaitons. I asked him to be sure to call to make an appt with endocrine or his PCP in the near future for further adjustments. " Fortunately, he now has a home health nurse to assist with medication preparation so hopefully this will help in ongoing management.     2. Pulmonary hypertension.   --RHC in May 2020 showed PA pressure of 28 mmHg (51/15), mean RA of 4mmHg, and mildly elevated filling pressures with a wedge of 12mmHg (Vwave 16). PVR was 3.9 Wood units and Carlos Manuel CO/CI was 4.09/2.23. With vasodilator challenge, his PVR normalized to 1.22 wood units. Therefore, he was deemed to have pulmonary hypertension out of proportion to his left heart disease.   --As per Dr. Bonilla's recommendations, now that he is euvolemic, will slowly reintroduce sildenafil. Will begin at 20 mg once daily and gradually up-titrate at 2 or 3-week intervals to b.i.d. and then 3 weeks later to t.i.d. Our nurse will reach out to him early next week to coordinate this with him, as he may require additional paperwork to get new insurance approval.    --Will need to watch closely for recurrent hypotension or worsening edema while on pulmonary vasodilators.    --In the future, when felt safe from a COVID standpoint, he could benefit from pulmonary rehab.     3. Paroxysmal atrial fibrillation/flutter.   --Heart rates under good control today and on exam he appears to remain in sinus rhythm. He is not currently on beta blockers, and remains on verapamil which I believe has been longstanding for him. Had a prolonged QTC on amiodarone in the past.    --He is on anticoagulation with apixaban 2.5mg BID due to age and renal function.     4. Hypertension.   --BP up a bit today but he reports somewhat lower at home. This may come down with initiation of sildenafil as above, so I am hesitant to add anything new for this. We could potentially reintroduce losartan in the future if renal function allows.       Follow up plan: Will try to arrange for return in person visits with myself or Ame Mejía PA-C in 2 weeks and again in 4 weeks with labs, in attempts to prevent hospital  readmission.       Orders this Visit:  Orders Placed This Encounter   Procedures     Basic metabolic panel     N terminal pro BNP outpatient     Follow-Up with [facility] - TAMAR visit     Follow-Up with [facility] - TAMAR visit     Orders Placed This Encounter   Medications     furosemide (LASIX) 40 MG tablet     Sig: Take 1 tablet (40 mg) by mouth daily     Dispense:  60 tablet     Refill:  0     Medications Discontinued During This Encounter   Medication Reason     furosemide (LASIX) 40 MG tablet          CURRENT MEDICATIONS:  Current Outpatient Medications   Medication Sig Dispense Refill     acetaminophen (TYLENOL) 325 MG tablet Take 2 tablets (650 mg) by mouth every 4 hours as needed for mild pain  0     apixaban ANTICOAGULANT (ELIQUIS) 5 MG tablet Take 1/2 tablet (2.5mg) by mouth twice daily. You will have your labs checked on 5/4/20 and your PCP will direct you when it is safe to increase your dose back to 1 tablet (5mg) twice daily.       furosemide (LASIX) 40 MG tablet Take 1 tablet (40 mg) by mouth daily 60 tablet 0     glucagon 1 MG kit 1 mg as needed for low blood sugar       insulin aspart (NOVOLOG PEN) 100 UNIT/ML pen Inject 8 units subcutaneous with breakfast, 8 units subcutaneous with lunch and 4 units with supper. 3 mL 0     insulin glargine (LANTUS PEN) 100 UNIT/ML pen Inject 10 Units Subcutaneous every morning 3 mL 0     LOVASTATIN 20 MG PO TABS 1 TABLET DAILY AT DINNER 90 Tab 0     nystatin (MYCOSTATIN) 534711 UNIT/GM external cream Apply topically 2 times daily as needed        potassium chloride ER (KLOR-CON M) 10 MEQ CR tablet Take 1 tablet (10 mEq) by mouth 2 times daily 60 tablet 0     tiotropium (SPIRIVA) 18 MCG inhaled capsule Inhale 18 mcg into the lungs daily       verapamil ER (VERELAN) 240 MG 24 hr capsule Take 1 capsule (240 mg) by mouth At Bedtime       polyethylene glycol (MIRALAX) 17 g packet Take 17 g by mouth 2 times daily (Patient not taking: Reported on 6/5/2020) 60 packet 0        ALLERGIES     Allergies   Allergen Reactions     No Known Drug Allergies        PAST MEDICAL HISTORY:  Past Medical History:   Diagnosis Date     Anemia      CKD (chronic kidney disease) stage 3, GFR 30-59 ml/min (H)      Fall 2019    suffered multiple left rib fractures, C3 and T2 fractures     Paroxysmal atrial fibrillation (H)      Personal history of colonic polyps     gets colonoscopy every five years, due in      Pleural effusion on left 2019    after multiple rib fractures     Rosacea      Type II or unspecified type diabetes mellitus without mention of complication, not stated as uncontrolled      Unspecified essential hypertension        PAST SURGICAL HISTORY:  Past Surgical History:   Procedure Laterality Date     ANESTHESIA CARDIOVERSION N/A 2/3/2020    Procedure: ANESTHESIA, FOR CARDIOVERSION;  Surgeon: GENERIC ANESTHESIA PROVIDER;  Location:  OR     CV RIGHT HEART CATH N/A 2020    Procedure: Right Heart Cath;  Surgeon: Senthil Silva MD;  Location:  HEART CARDIAC CATH LAB     HC REMOVE TONSILS/ADENOIDS,<11 Y/O      T & A <11y.o.     IR CHEST TUBE DRAIN TUNNELED LEFT  2020       FAMILY HISTORY:  Family History   Problem Relation Age of Onset     Family History Negative Mother          age 71     Family History Negative Father          age 70     Diabetes Brother         alive age 77     Diabetes Brother         alive age 76     Family History Negative Brother              Family History Negative Brother              Diabetes Sister         alive age76     Family History Negative Sister          age 86     Heart Disease Sister              Family History Negative Sister              Family History Negative Sister              Diabetes Sister      Diabetes Sister      Diabetes Brother      Diabetes Brother        SOCIAL HISTORY:  Social History     Socioeconomic History     Marital status:       Spouse name: Lianna     Number of children: 4     Years of education: 16     Highest education level: None   Occupational History     Occupation: [employer]     Employer: [employer]    Social Needs     Financial resource strain: None     Food insecurity     Worry: None     Inability: None     Transportation needs     Medical: None     Non-medical: None   Tobacco Use     Smoking status: Former Smoker     Packs/day: 0.20     Years: 40.00     Pack years: 8.00     Types: Cigarettes     Last attempt to quit: 2008     Years since quittin.4     Smokeless tobacco: Never Used     Tobacco comment: occasional   Substance and Sexual Activity     Alcohol use: Not Currently     Alcohol/week: 35.0 standard drinks     Drug use: Not Currently     Sexual activity: None   Lifestyle     Physical activity     Days per week: None     Minutes per session: None     Stress: None   Relationships     Social connections     Talks on phone: None     Gets together: None     Attends Mosque service: None     Active member of club or organization: None     Attends meetings of clubs or organizations: None     Relationship status: None     Intimate partner violence     Fear of current or ex partner: None     Emotionally abused: None     Physically abused: None     Forced sexual activity: None   Other Topics Concern     Parent/sibling w/ CABG, MI or angioplasty before 65F 55M? Not Asked   Social History Narrative     None       Review of Systems:  Cardiovascular: negative for chest pain, palpitations, orthopnea, pos LE edema (unchanged)  Constitutional: negative for chills, sweats, fevers. Pos fatigue.   Resp: Negative for dyspnea at rest, pos dyspnea on exertion (better), neg cough, wheezing, pos known lung disease/recurrent pleural effusion.   HEENT: Negative for new visual changes, frequent headaches. Pos dizziness with BS fluctuations.  Gastrointestinal: negative for abdominal pain, diarrhea, pos occasional  "nausea. Neg vomiting  Hematologic/lymphatic: pos for current systemic anticoagulation  Musculoskeletal: pos back pain  Neurological: negative for focal weakness, LOC, seizures, syncope      Physical Exam:  Vitals: BP (!) 149/82 (BP Location: Left arm, Patient Position: Sitting, Cuff Size: Adult Regular)   Pulse 78   Ht 1.753 m (5' 9\")   Wt 67.5 kg (148 lb 12.8 oz)   SpO2 100%   BMI 21.97 kg/m     Wt Readings from Last 4 Encounters:   06/05/20 67.5 kg (148 lb 12.8 oz)   05/29/20 69.4 kg (153 lb)   05/21/20 70.3 kg (155 lb)   05/18/20 74.4 kg (164 lb)       GEN:  In general, this is a well nourished elderly male in no acute distress on room air.  Patient ambulatory, unaccompanied.   HEENT:  Pupils equal, round. Sclerae nonicteric.   NECK: Supple, no masses appreciated. Trachea midline. No significant JVD while upright.   C/V:  Regular rate and rhythm, 1/6 ELIZABETH heard over LSB. No rub or gallop. Mildly accentuated P2 but no S3 or RV heave.   RESP: Respirations are unlabored. No use of accessory muscles. Somewhat diminished left base but no active wheezing, or rhonchi.  GI: Abdomen soft, nontender, not significantly distended.   EXTREM: Trace to 1+ ankle bilateral edema. No cyanosis or clubbing.  NEURO: Alert and oriented, cooperative. Gait not assessed. No obvious focal deficits.   PSYCH: Normal affect during my visit, seemed to answer questions appropriately.   SKIN: Warm and dry. No rashes or petechiae appreciated.     Recent Lab Results:  LIPID RESULTS:  Lab Results   Component Value Date    CHOL 116 05/13/2020    HDL 60 05/13/2020    LDL 43 05/13/2020    TRIG 65 05/13/2020             LIVER ENZYME RESULTS:  Lab Results   Component Value Date    AST 27 05/21/2020    ALT 24 05/21/2020       CBC RESULTS:  Lab Results   Component Value Date    WBC 10.4 05/25/2020    RBC 3.36 (L) 05/25/2020    HGB 8.9 (L) 05/25/2020    HCT 28.6 (L) 05/25/2020    MCV 85 05/25/2020    MCH 26.5 05/25/2020    MCHC 31.1 (L) 05/25/2020    " RDW 18.0 (H) 05/25/2020     05/25/2020       BMP RESULTS:  Lab Results   Component Value Date     06/05/2020    POTASSIUM 4.0 06/05/2020    CHLORIDE 101 06/05/2020    CO2 29 06/05/2020    ANIONGAP 9 06/05/2020     (H) 06/05/2020    BUN 21 06/05/2020    CR 1.66 (H) 06/05/2020    GFRESTIMATED 38 (L) 06/05/2020    GFRESTBLACK 44 (L) 06/05/2020    LLOYD 8.9 06/05/2020        Additional pertinent testing:  Echo 5/11/2020     Interpretation Summary     1. Left ventricular systolic function is normal. The visual ejection fraction  is estimated at 55-60%.  2. No regional wall motion abnormalities noted.  3. The right ventricle is mild to moderately dilated. Mildly decreased right  ventricular systolic function  4. There is mild (1+) tricuspid regurgitation.  5. Moderate pulmonary hypertension; The right ventricular systolic pressure is  approximated at 47mmHg plus the right atrial pressure. IVC diameter and  respiratory changes fall into an intermediate range suggesting an RA pressure  of 8 mmHg.  6. Left pleural effusion is present.  7. In comparison with the prior report, estimated pulmonary pressures have  increased somewhat, otherwise, no significant change    Greater than 40 minutes was spent with this patient, >50% of which was spent in counseling and coordination of care.       Katlin Fontanez PA-C  [facility]  Pager [phone]      Thank you for allowing me to participate in the care of your patient.    Sincerely,     FORTUNATO Reynoso     [facility]

## 2020-06-08 ENCOUNTER — CARE COORDINATION (OUTPATIENT)
Dept: CARDIOLOGY | Facility: CLINIC | Age: 80
End: 2020-06-08

## 2020-06-08 ENCOUNTER — MEDICAL CORRESPONDENCE (OUTPATIENT)
Dept: HEALTH INFORMATION MANAGEMENT | Facility: CLINIC | Age: 80
End: 2020-06-08

## 2020-06-08 DIAGNOSIS — J90 RECURRENT PLEURAL EFFUSION ON LEFT: ICD-10-CM

## 2020-06-08 NOTE — PROGRESS NOTES
Call from [facility] nurse Lore [phone] with update that patient weight trending up from last week.     Weights    Date  146.5  lbs  6/5/2020  146            6/6  147            6/7  151            6/8    Current lasix dose 40 mg daily - decrease from 6/5/2020 visit with Katlin BUCIO due to elevation in Creatinine.  As his SCr has trended up, will cautiously reduce his Lasix to 40mg once daily (from BID). I've asked him to continue to weigh daily at home and call with an upward trend or worsening swelling. He should continue compression socks and a reduced sodium diet. He tells me he has an appt with nephrology next week. If acceptable from a renal standpoint, would have a low threshold to go back up on Lasix if necessary.    Orders Only on 06/05/2020   Component Date Value Ref Range Status     Sodium 06/05/2020 139  133 - 144 mmol/L Final     Potassium 06/05/2020 4.0  3.4 - 5.3 mmol/L Final     Chloride 06/05/2020 101  94 - 109 mmol/L Final     Carbon Dioxide 06/05/2020 29  20 - 32 mmol/L Final     Anion Gap 06/05/2020 9  3 - 14 mmol/L Final     Glucose 06/05/2020 209* 70 - 99 mg/dL Final     Urea Nitrogen 06/05/2020 21  7 - 30 mg/dL Final     Creatinine 06/05/2020 1.66* 0.66 - 1.25 mg/dL Final     GFR Estimate 06/05/2020 38* >60 mL/min/[1.73_m2] Final    Comment: Non  GFR Calc  Starting 12/18/2018, serum creatinine based estimated GFR (eGFR) will be   calculated using the Chronic Kidney Disease Epidemiology Collaboration   (CKD-EPI) equation.       GFR Estimate If Black 06/05/2020 44* >60 mL/min/[1.73_m2] Final    Comment:  GFR Calc  Starting 12/18/2018, serum creatinine based estimated GFR (eGFR) will be   calculated using the Chronic Kidney Disease Epidemiology Collaboration   (CKD-EPI) equation.       Calcium 06/05/2020 8.9  8.5 - 10.1 mg/dL Final     Creatinine at high of 1.66 (5/29 was 1.42)  Called to patient. No symptoms - denies SOB, leg swelling not better  "or worse. Patient reports he has been have fluid removed from pleurX catheter and it is \"drying up\".    Will message to Dr Bonilla in Katlin absence.    Future Appointments   Date Time Provider Department Center   6/16/2020  1:10 PM [facility]   6/16/2020  1:50 PM Katlin Fontanez PA [facility]     Corin Noble RN 06/08/20 4:17 PM    Reviewed with Dr Bonilla - will address with TAMAR in am on her return.  Patient states Nephrology appointment is scheduled for tomorrow over the phone at 230 pm.  Will update TAMAR.  Corin Noble RN 06/08/2020  4:31 PM      "

## 2020-06-09 ENCOUNTER — MEDICAL CORRESPONDENCE (OUTPATIENT)
Dept: HEALTH INFORMATION MANAGEMENT | Facility: CLINIC | Age: 80
End: 2020-06-09

## 2020-06-09 RX ORDER — FUROSEMIDE 40 MG
40 TABLET ORAL 2 TIMES DAILY
Qty: 180 TABLET | Refills: 0 | Status: SHIPPED | OUTPATIENT
Start: 2020-06-09 | End: 2020-08-19

## 2020-06-09 NOTE — TELEPHONE ENCOUNTER
6/9/2020 Signed [employer] form faxed with Katlin BUCIO note, [facility], and insurance cards.  Corin Noble RN 06/09/20 9:48 AM

## 2020-06-09 NOTE — PROGRESS NOTES
"Katlin Fontanez PA  You 6/9/2020 (8:14 AM)       My recommendation would be to go back to Lasix 40mg BID, but if he has an appt with his nephrologist today, hopefully he will weigh in as well.   If his nephrologist is not in the system, could you please obtain the note from that visit tomorrow for review?   Thanks,   Katlin      =====================  Call to patient with plan to increase Lasix back to 40 mg twice a day - he takes this at 900 am and 900 pm and does not want to take at different times. Patient reports his weight today as 151.5 lbs, \"same as yesterday\". Patient agrees to weigh daily and to call if weights continue to trend up or symptoms develop or swelling worsens.     Patient is seeing Nephrology today @ 230 pm - Vidhi Galo NP @ [facility]  [phone]. They will fax over note when available.  Corin Noble RN 06/09/2020 9:02 AM      "

## 2020-06-10 DIAGNOSIS — N18.30 ANEMIA IN STAGE 3 CHRONIC KIDNEY DISEASE (H): ICD-10-CM

## 2020-06-10 DIAGNOSIS — D63.1 ANEMIA IN STAGE 3 CHRONIC KIDNEY DISEASE (H): ICD-10-CM

## 2020-06-10 PROBLEM — N18.9 ANEMIA IN CKD (CHRONIC KIDNEY DISEASE): Status: ACTIVE | Noted: 2020-06-10

## 2020-06-10 RX ORDER — HEPARIN SODIUM,PORCINE 10 UNIT/ML
5 VIAL (ML) INTRAVENOUS
Status: CANCELLED | OUTPATIENT
Start: 2020-06-15

## 2020-06-10 RX ORDER — HEPARIN SODIUM (PORCINE) LOCK FLUSH IV SOLN 100 UNIT/ML 100 UNIT/ML
5 SOLUTION INTRAVENOUS
Status: CANCELLED | OUTPATIENT
Start: 2020-06-15

## 2020-06-11 ENCOUNTER — CARE COORDINATION (OUTPATIENT)
Dept: CARDIOLOGY | Facility: CLINIC | Age: 80
End: 2020-06-11

## 2020-06-11 DIAGNOSIS — D63.1 ANEMIA OF CHRONIC RENAL FAILURE: ICD-10-CM

## 2020-06-11 DIAGNOSIS — N18.9 ANEMIA OF CHRONIC RENAL FAILURE: ICD-10-CM

## 2020-06-11 DIAGNOSIS — E11.9 DIABETES MELLITUS (H): ICD-10-CM

## 2020-06-11 DIAGNOSIS — N18.30 CHRONIC KIDNEY DISEASE, STAGE III (MODERATE) (H): Primary | ICD-10-CM

## 2020-06-11 NOTE — PROGRESS NOTES
Call from patient that he received sildenafil prescription via mail order and cost will be $27 for a three month supply. Patient asks when Katlin BUCIO would like him to start back on drug. Reviewed with patient to wait to discuss at in-person visit with Katlin scheduled for 6/16/2020 and a plan will be provided to him then on start date, dosing and plan for titration. Reviewed Dr Bonilla has suggested to restart at low dose of 20 mg daily and titrate up as tolerated. Also reviewed Katlin will continue to assess his fluid status and time restart and titration based partially on that. Patient voices no complaints at this time and states agreement to recommendations.     Nephrology note pending from visit with week. Will forward to TAMAR when received.   Corin Noble RN 06/11/20 6:48 PM

## 2020-06-15 ENCOUNTER — INFUSION THERAPY VISIT (OUTPATIENT)
Dept: INFUSION THERAPY | Facility: CLINIC | Age: 80
End: 2020-06-15
Attending: NURSE PRACTITIONER
Payer: COMMERCIAL

## 2020-06-15 VITALS
RESPIRATION RATE: 16 BRPM | HEART RATE: 76 BPM | TEMPERATURE: 98 F | DIASTOLIC BLOOD PRESSURE: 68 MMHG | SYSTOLIC BLOOD PRESSURE: 125 MMHG

## 2020-06-15 DIAGNOSIS — D63.1 ANEMIA IN STAGE 3 CHRONIC KIDNEY DISEASE (H): Primary | ICD-10-CM

## 2020-06-15 DIAGNOSIS — N18.30 CRF (CHRONIC RENAL FAILURE), STAGE 3 (MODERATE) (H): ICD-10-CM

## 2020-06-15 DIAGNOSIS — N18.30 ANEMIA IN STAGE 3 CHRONIC KIDNEY DISEASE (H): Primary | ICD-10-CM

## 2020-06-15 PROCEDURE — 25000128 H RX IP 250 OP 636: Performed by: NURSE PRACTITIONER

## 2020-06-15 PROCEDURE — 96374 THER/PROPH/DIAG INJ IV PUSH: CPT

## 2020-06-15 PROCEDURE — 25800030 ZZH RX IP 258 OP 636: Performed by: NURSE PRACTITIONER

## 2020-06-15 PROCEDURE — 96365 THER/PROPH/DIAG IV INF INIT: CPT

## 2020-06-15 RX ORDER — HEPARIN SODIUM (PORCINE) LOCK FLUSH IV SOLN 100 UNIT/ML 100 UNIT/ML
5 SOLUTION INTRAVENOUS
Status: CANCELLED | OUTPATIENT
Start: 2020-06-15

## 2020-06-15 RX ORDER — HEPARIN SODIUM,PORCINE 10 UNIT/ML
5 VIAL (ML) INTRAVENOUS
Status: CANCELLED | OUTPATIENT
Start: 2020-06-15

## 2020-06-15 RX ADMIN — SODIUM CHLORIDE 250 ML: 9 INJECTION, SOLUTION INTRAVENOUS at 14:41

## 2020-06-15 RX ADMIN — FERUMOXYTOL 510 MG: 510 INJECTION INTRAVENOUS at 14:41

## 2020-06-15 NOTE — PROGRESS NOTES
Infusion Nursing Note:  Tc Garcia presents today for Feraheme.    Patient seen by provider today: No   present during visit today: Not Applicable.    Note: N/A.    Intravenous Access:  Peripheral IV placed.    Treatment Conditions:  Not Applicable.      Post Infusion Assessment:  Patient tolerated infusion without incident.  Patient observed for 30 minutes post Feraheme per protocol.  Blood return noted pre and post infusion.  Site patent and intact, free from redness, edema or discomfort.  No evidence of extravasations.  Access discontinued per protocol.       Discharge Plan:   Discharge instructions reviewed with: Patient.  Patient and/or family verbalized understanding of discharge instructions and all questions answered.  AVS to patient via [employer].  Patient will return 6/22/20 for next appointment.   Patient discharged in stable condition accompanied by: self.  Departure Mode: Ambulatory.    Christiano Miller RN

## 2020-06-16 ENCOUNTER — OFFICE VISIT (OUTPATIENT)
Dept: CARDIOLOGY | Facility: CLINIC | Age: 80
End: 2020-06-16
Payer: COMMERCIAL

## 2020-06-16 VITALS
BODY MASS INDEX: 22.33 KG/M2 | DIASTOLIC BLOOD PRESSURE: 87 MMHG | WEIGHT: 156 LBS | OXYGEN SATURATION: 100 % | HEART RATE: 86 BPM | HEIGHT: 70 IN | SYSTOLIC BLOOD PRESSURE: 161 MMHG

## 2020-06-16 DIAGNOSIS — N18.9 ANEMIA OF CHRONIC RENAL FAILURE: ICD-10-CM

## 2020-06-16 DIAGNOSIS — E11.9 DIABETES MELLITUS (H): ICD-10-CM

## 2020-06-16 DIAGNOSIS — N18.30 CHRONIC KIDNEY DISEASE, STAGE III (MODERATE) (H): ICD-10-CM

## 2020-06-16 DIAGNOSIS — D63.1 ANEMIA OF CHRONIC RENAL FAILURE: ICD-10-CM

## 2020-06-16 DIAGNOSIS — I27.20 PULMONARY HYPERTENSION (H): ICD-10-CM

## 2020-06-16 DIAGNOSIS — R06.09 DYSPNEA ON EXERTION: ICD-10-CM

## 2020-06-16 DIAGNOSIS — I50.33 ACUTE ON CHRONIC HEART FAILURE WITH PRESERVED EJECTION FRACTION (HFPEF) (H): ICD-10-CM

## 2020-06-16 LAB
ALBUMIN SERPL-MCNC: 2.7 G/DL (ref 3.4–5)
ANION GAP SERPL CALCULATED.3IONS-SCNC: 10 MMOL/L (ref 6–17)
BUN SERPL-MCNC: 18 MG/DL (ref 7–30)
CALCIUM SERPL-MCNC: 9.1 MG/DL (ref 8.5–10.5)
CHLORIDE SERPL-SCNC: 100 MMOL/L (ref 98–107)
CO2 SERPL-SCNC: 30 MMOL/L (ref 23–29)
CREAT SERPL-MCNC: 1.89 MG/DL (ref 0.7–1.3)
GFR SERPL CREATININE-BSD FRML MDRD: 34 ML/MIN/{1.73_M2}
GLUCOSE SERPL-MCNC: 264 MG/DL (ref 70–105)
HGB BLD-MCNC: 8.9 G/DL (ref 13.3–17.7)
NT-PROBNP SERPL-MCNC: 2020 PG/ML (ref 0–450)
PHOSPHATE SERPL-MCNC: 3.5 MG/DL (ref 2.5–4.5)
POTASSIUM SERPL-SCNC: 4 MMOL/L (ref 3.5–5.1)
PTH-INTACT SERPL-MCNC: 185 PG/ML (ref 18–80)
SODIUM SERPL-SCNC: 136 MMOL/L (ref 136–145)

## 2020-06-16 PROCEDURE — 36415 COLL VENOUS BLD VENIPUNCTURE: CPT | Performed by: INTERNAL MEDICINE

## 2020-06-16 PROCEDURE — 85018 HEMOGLOBIN: CPT | Performed by: NURSE PRACTITIONER

## 2020-06-16 PROCEDURE — 99215 OFFICE O/P EST HI 40 MIN: CPT | Performed by: PHYSICIAN ASSISTANT

## 2020-06-16 PROCEDURE — 83880 ASSAY OF NATRIURETIC PEPTIDE: CPT | Performed by: NURSE PRACTITIONER

## 2020-06-16 PROCEDURE — 83970 ASSAY OF PARATHORMONE: CPT | Performed by: NURSE PRACTITIONER

## 2020-06-16 PROCEDURE — 80069 RENAL FUNCTION PANEL: CPT | Performed by: NURSE PRACTITIONER

## 2020-06-16 ASSESSMENT — MIFFLIN-ST. JEOR: SCORE: 1418.86

## 2020-06-16 NOTE — LETTER
6/16/2020    Charlie Finch MD  [address]  [facility] [address]    RE: Tc Garcia       Dear Colleague,    I had the pleasure of seeing Tc Garcia in the [facility].      Cardiology Progress Note    Date of Service: 06/16/2020      Reason for visit: Follow up pulmonary hypertension, HFpEF.    Cardiology team: Dr. Julien (general), Dr. Camejo (EP), Dr. Bonilla (PH), Annette Mejía PA-C ([facility])      HPI:  Mr. Garcia is an 80 year old male with a PMhx including poorly controlled diabetes, HTN, chronic anemia, recurrent left pleural effusion, paroxysmal atrial fibrillation/flutter, and HFpEF with pulmonary hypertension. He was hospitalized in early May with progressive dyspnea and due to recurrent (transudative) pleural effusion a left PleurX catheter was placed. He was diuresed with IV furosemide and given empiric abx therapy. Echo showed normal LVEF 55-60% with normal wall motion. The RV was moderately dilated with decreased systolic function and mild TR. He underwent a RHC during that stay that showed mild PH (PA pressure of 28 mmHg (51/15), mean RA of 4mmHg, and mildly elevated filling pressures with a wedge of 12mmHg (Vwave 16). PVR was 3.9 Wood units and Carlos Manuel CO/CI was 4.09/2.23. With vasodilator challenge, his PVR normalized to 1.22 wood units. Therefore, he was deemed to have pulmonary hypertension out of proportion to his left heart disease. He was started on Cialis 5mg daily but because this was not a formulated preparation, was switched to sildenafil 20 mg 3 times daily and discharged home. Interestingly, he was not discharged on any diuretic therapy. Discharge wt was 152 lbs. His admission was complicated by atrial flutter for which he was given amiodarone but because of prolongation in QTc, this was discontinued, and he was continued on his PTA verapamil.       Following discharge he developed symptomatic hypotension and via phone was told to stop the  "sildenafil. He then saw Dr. Chad gusman for PH evaluation a few days later on 5/18. He reported hypertension with SBP >200mmHg and weight was up 12 lbs from discharge. Torsemide 10 mg daily, Losartan 25 mg daily, and KCL 40 mEq were all started. She recommended resuming sildenafil once he was felt to be euvolemic. He was referred to the [facility] for an in person visit for close monitoring in attempt to prevent readmission. He was to see Ame Mejía PA-C on 5/21. He called to cancel the appt because he wasn't feeling well (nausea) and thus instead a phone visit was conducted. She had a difficult time assessing him via phone, though reported losing 10 lbs at home. Ame agreed that an in -person visit was necessary in order to adequately assess him.     However, later that day he presented back to the ED with shortness of breath and having run out of insulin in his pump. He was hospitalized again from 5/21 to 5/29. There was concern regarding his overall cognition vs confusion due to fluctuating blood sugars. It was noted that the medications he was truly taking were much different than what we had been attempting to adjust. His insulin pump was removed and regimen adjusted. A repeat chest CT showed no pneumonia and improvement in his effusions since placement of his pleurex. He again required IV lasix and was discharged on Lasix 40mg BID in place of the torsemide. Due to renal concerns, his hydrochlorothiazide and losartan were held. Fortunately, home health was arranged to help him manage his medications at home.     I saw Mr. Garcia in the [facility] in person on 6/5/2020. His weight on our scale was down to 148 lbs though he felt that his lower extremity edema was \"about the same.\"  The home health nurse has been coming frequently to help with his medications and he endorses compliance. Unfortunately, blood sugars continued to remain poorly controlled. Labs at that visit showed a slight increase in his SCr " "to 1.66 and I asked him to reduce his lasix to 40mg daily. In addition, we discussed a plan to resume sildenafil. Unfortunately, his home care nurse called a few days later to report a weight gain of 5 lbs in 3-4 days. Thus, we asked him to go back to 40mg BID for his lasix. He followed up with nephrology shortly after, and iron infusions were recommended. No changes were made to his diuretic regimen.    Today, I am seeing Tc again in the [facility] for close follow up. Today, his weight is back up to 156 lbs, though he says he did go back to the Lasix 40mg BID. Despite this, he tells me he's feeling much better overall. He had an iron infusion yesterday and notes more energy. Again, he says swelling is \"about the same.\" He saw his endocrine team last week and states they are adjusting his insulin regimen. His home health nurse continues to help with medication compliance and daily weights. He still tries to drain his pleurex catheter but has not gotten out in a fluid in several days. Upon questioning, he is not wearing compression socks daily, and I believe he continues with a fairly high salt diet. He tells me today he ate \"a lot of watermelon and salted it\" this morning which is why he thinks his weight is up, as he tells me he was 151 lbs at home. He also on occasion eats canned vegetables and fruits, prepackaged meals, and puts salt on his eggs and hashbrowns in the morning.     His labs today, show that his SCr has gone up a bit further to 1.89 (renal reports baseline 1.4-1.6). His BUN is 18, and electrolytes were acceptable. NT-proBNP was 2020, though this is down from 5-7K while in the hospital in May. His hemoglobin remained low at 8.9, though this is stable from previous (receiving iron infusions).      Assessment/Plan:    1. Acute on chronic HFpEF.   --Overall, his situation has been complex. His most recent echo from May 2020 shows preserved EF 55-60%, with normal wall motion. RV was mild to " moderately dilated with mildly decreased RV function. RHC during his hospital stay in May as below.    --I am still not entirely clear on his dry/goal weight. Today it is back up to 156 lbs on our scale (from 148 lbs last visit) and Scr has gone up further. I am suspicious that his high salt diet is playing a role. I spent a lot of time educating him on a low sodium diet today, and handouts were provided. I also asked him to wear the compression socks daily. Labs today show a bump in his SCr, though NT-proBNP is down from his hospital stay. For now, I made no diuretic changes. We will check back in with him later this week.    -- Fortunately, he now has a home health nurse to assist with medication preparation so hopefully this will help in ongoing management. I've asked him to follow up with endocrine and nephrology as directed as well.   --It does not appear that he has any follow up arranged for management of his pleurex. He endorses little to no drainage in his pleurex over the last week, so will request a pulmonary referral to assist with further management.     2. Pulmonary hypertension.   --RHC in May 2020 showed PA pressure of 28 mmHg (51/15), mean RA of 4mmHg, and mildly elevated filling pressures with a wedge of 12mmHg (Vwave 16). PVR was 3.9 Wood units and Carlos Manuel CO/CI was 4.09/2.23. With vasodilator challenge, his PVR normalized to 1.22 wood units. Therefore, he was deemed to have pulmonary hypertension out of proportion to his left heart disease.   --As per Dr. Bonilla's recommendations, once he is euvolemic, we will plan to slowly reintroduce sildenafil. Will begin at 20 mg once daily and gradually up-titrate at 2 or 3-week intervals to b.i.d. and then 3 weeks later to t.i.d. Given ongoing issues with weight fluctuations, however, will continue to hold on this for now.    --In the future, when felt safe from a COVID standpoint, he could benefit from pulmonary rehab.     3. Paroxysmal atrial  fibrillation/flutter.   --Heart rates under good control again today and on exam he appears to remain in sinus rhythm. He is not currently on beta blockers, and remains on verapamil which I believe has been longstanding for him. Had a prolonged QTC on amiodarone in the past.    --He is on anticoagulation with apixaban 2.5mg BID due to age and renal function.     4. Hypertension.   --BP up again today but he reports somewhat lower at home. Will continue to monitor with medication adjustments, and this may come down with initiation of sildenafil as above. However, there are plans to eventually reintroduce losartan in the future as per nephrology.       Follow up plan: Will try to arrange for return in person visits again with myself or Ame Mejía PA-C in ~2 weeks with labs in attempts to prevent hospital readmission.       Orders this Visit:  Orders Placed This Encounter   Procedures     Basic metabolic panel     N terminal pro BNP outpatient     Follow-Up with [facility] visit     No orders of the defined types were placed in this encounter.    There are no discontinued medications.      CURRENT MEDICATIONS:  Current Outpatient Medications   Medication Sig Dispense Refill     acetaminophen (TYLENOL) 325 MG tablet Take 2 tablets (650 mg) by mouth every 4 hours as needed for mild pain  0     apixaban ANTICOAGULANT (ELIQUIS) 5 MG tablet Take 1/2 tablet (2.5mg) by mouth twice daily. You will have your labs checked on 5/4/20 and your PCP will direct you when it is safe to increase your dose back to 1 tablet (5mg) twice daily.       furosemide (LASIX) 40 MG tablet Take 1 tablet (40 mg) by mouth 2 times daily 180 tablet 0     glucagon 1 MG kit 1 mg as needed for low blood sugar       insulin aspart (NOVOLOG PEN) 100 UNIT/ML pen Inject 8 units subcutaneous with breakfast, 8 units subcutaneous with lunch and 4 units with supper. 3 mL 0     insulin glargine (LANTUS PEN) 100 UNIT/ML pen Inject 10 Units Subcutaneous  every morning 3 mL 0     LOVASTATIN 20 MG PO TABS 1 TABLET DAILY AT DINNER 90 Tab 0     polyethylene glycol (MIRALAX) 17 g packet Take 17 g by mouth 2 times daily 60 packet 0     potassium chloride ER (KLOR-CON M) 10 MEQ CR tablet Take 1 tablet (10 mEq) by mouth 2 times daily 60 tablet 0     tiotropium (SPIRIVA) 18 MCG inhaled capsule Inhale 18 mcg into the lungs daily       verapamil ER (VERELAN) 240 MG 24 hr capsule Take 1 capsule (240 mg) by mouth At Bedtime       nystatin (MYCOSTATIN) 523513 UNIT/GM external cream Apply topically 2 times daily as needed          ALLERGIES     Allergies   Allergen Reactions     No Known Drug Allergies        PAST MEDICAL HISTORY:  Past Medical History:   Diagnosis Date     Anemia      Anemia of chronic disease 5/14/2020     CKD (chronic kidney disease) stage 3, GFR 30-59 ml/min (H)      CRF (chronic renal failure), stage 3  5/14/2020     Fall 11/2019    suffered multiple left rib fractures, C3 and T2 fractures     Mixed hyperlipidemia 7/7/2004     Paroxysmal atrial fibrillation (H)      Personal history of colonic polyps 1972    gets colonoscopy every five years, due in 2006     Pleural effusion on left 11/2019    after multiple rib fractures     Pulmonary hypertension (H) 5/10/2020    Added automatically from request for surgery [id number]     Recurrent Left Pleural effusion -- S/P Pleurex Cath 5/12/20 12/30/2019     Rosacea 1989     Type II or unspecified type diabetes mellitus without mention of complication, not stated as uncontrolled 1999     Unspecified essential hypertension 1994       PAST SURGICAL HISTORY:  Past Surgical History:   Procedure Laterality Date     ANESTHESIA CARDIOVERSION N/A 2/3/2020    Procedure: ANESTHESIA, FOR CARDIOVERSION;  Surgeon: GENERIC ANESTHESIA PROVIDER;  Location:  OR     CV RIGHT HEART CATH N/A 5/13/2020    Procedure: Right Heart Cath;  Surgeon: Senthil Silva MD;  Location:  HEART CARDIAC CATH LAB     HC REMOVE TONSILS/ADENOIDS,<12  Y/O      T & A <11y.o.     IR CHEST TUBE DRAIN TUNNELED LEFT  2020       FAMILY HISTORY:  Family History   Problem Relation Age of Onset     Family History Negative Mother          age 71     Family History Negative Father          age 70     Diabetes Brother         alive age 77     Diabetes Brother         alive age 76     Family History Negative Brother              Family History Negative Brother              Diabetes Sister         alive age76     Family History Negative Sister          age 86     Heart Disease Sister              Family History Negative Sister              Family History Negative Sister              Diabetes Sister      Diabetes Sister      Diabetes Brother      Diabetes Brother        SOCIAL HISTORY:  Social History     Socioeconomic History     Marital status:      Spouse name: Lianna     Number of children: 4     Years of education: 16     Highest education level: None   Occupational History     Occupation: [employer]     Employer: [employer]    Social Needs     Financial resource strain: None     Food insecurity     Worry: None     Inability: None     Transportation needs     Medical: None     Non-medical: None   Tobacco Use     Smoking status: Former Smoker     Packs/day: 0.20     Years: 40.00     Pack years: 8.00     Types: Cigarettes     Last attempt to quit: 2008     Years since quittin.4     Smokeless tobacco: Never Used     Tobacco comment: occasional   Substance and Sexual Activity     Alcohol use: Not Currently     Alcohol/week: 35.0 standard drinks     Drug use: Not Currently     Sexual activity: None   Lifestyle     Physical activity     Days per week: None     Minutes per session: None     Stress: None   Relationships     Social connections     Talks on phone: None     Gets together: None     Attends Muslim service: None     Active member of club or organization: None     Attends  "meetings of clubs or organizations: None     Relationship status: None     Intimate partner violence     Fear of current or ex partner: None     Emotionally abused: None     Physically abused: None     Forced sexual activity: None   Other Topics Concern     Parent/sibling w/ CABG, MI or angioplasty before 65F 55M? Not Asked   Social History Narrative     None       Review of Systems:  Cardiovascular: negative for chest pain, palpitations, orthopnea, pos LE edema (unchanged)  Constitutional: negative for chills, sweats, fevers. Pos fatigue, improved.  Resp: Negative for dyspnea at rest, pos dyspnea on exertion, improved, neg cough, wheezing, pos known lung disease/recurrent pleural effusion.   HEENT: Negative for new visual changes, frequent headaches.   Gastrointestinal: negative for abdominal pain, diarrhea, vomiting.  Hematologic/lymphatic: pos for current systemic anticoagulation  Neurological: negative for focal weakness, LOC, seizures, syncope      Physical Exam:  Vitals: BP (!) 161/87   Pulse 86   Ht 1.778 m (5' 10\")   Wt 70.8 kg (156 lb)   SpO2 100%   BMI 22.38 kg/m     Wt Readings from Last 4 Encounters:   06/16/20 70.8 kg (156 lb)   06/05/20 67.5 kg (148 lb 12.8 oz)   05/29/20 69.4 kg (153 lb)   05/21/20 70.3 kg (155 lb)       GEN:  In general, this is a well nourished elderly male in no acute distress on room air.  Patient ambulatory, unaccompanied.   HEENT:  Pupils equal, round. Sclerae nonicteric.   NECK: Supple, no masses appreciated. Trachea midline. No significant JVD while upright.   C/V:  Regular rate and rhythm, 1/6 ELIZABETH heard over LSB. No rub or gallop. Mildly accentuated P2 but no S3 or RV heave.   RESP: Respirations are unlabored. No use of accessory muscles. Diminished left base but no active wheezing, or rhonchi.  GI: Abdomen soft, nontender, not significantly distended.   EXTREM: Still 1+ ankle bilateral edema, not much change from last visit. No cyanosis or clubbing.  NEURO: Alert and " oriented, cooperative. Gait not assessed. No obvious focal deficits.   PSYCH: Normal affect during my visit, answered my questions appropriately.   SKIN: Warm and dry. No rashes or petechiae appreciated.     Recent Lab Results:  LIPID RESULTS:  Lab Results   Component Value Date    CHOL 116 05/13/2020    HDL 60 05/13/2020    LDL 43 05/13/2020    TRIG 65 05/13/2020             CBC RESULTS:  Lab Results   Component Value Date                HGB 8.9 (L) 06/16/2020       BMP RESULTS:  Lab Results   Component Value Date     06/16/2020    POTASSIUM 4.0 06/16/2020    CHLORIDE 100 06/16/2020    CO2 30 (H) 06/16/2020    ANIONGAP 10 06/16/2020     (H) 06/16/2020    BUN 18 06/16/2020    CR 1.89 (H) 06/16/2020    GFRESTIMATED 34 (L) 06/16/2020    GFRESTBLACK 42 (L) 06/16/2020    LLOYD 9.1 06/16/2020        Additional pertinent testing:  Echo 5/11/2020     Interpretation Summary     1. Left ventricular systolic function is normal. The visual ejection fraction  is estimated at 55-60%.  2. No regional wall motion abnormalities noted.  3. The right ventricle is mild to moderately dilated. Mildly decreased right  ventricular systolic function  4. There is mild (1+) tricuspid regurgitation.  5. Moderate pulmonary hypertension; The right ventricular systolic pressure is  approximated at 47mmHg plus the right atrial pressure. IVC diameter and  respiratory changes fall into an intermediate range suggesting an RA pressure  of 8 mmHg.  6. Left pleural effusion is present.  7. In comparison with the prior report, estimated pulmonary pressures have  increased somewhat, otherwise, no significant change    Greater than 40 minutes was spent with this patient, >50% of which was spent in counseling and coordination of care.       Katlin Fontanez PA-C  [facility]  Pager [phone]      Thank you for allowing me to participate in the care of your patient.    Sincerely,     FORTUNATO Reynoso     [facility]  Care

## 2020-06-16 NOTE — LETTER
6/16/2020    Charlie Finch MD  [address]  [facility] [address]    RE: Tc Garcia       Dear Colleague,    I had the pleasure of seeing Tc Garcia in the [facility].      Cardiology Progress Note    Date of Service: 06/16/2020      Reason for visit: Follow up pulmonary hypertension, HFpEF.    Cardiology team: Dr. Julien (general), Dr. Camejo (EP), Dr. Bonilla (PH), Annette Mejía PA-C ([facility])      HPI:  Mr. Garcia is an 80 year old male with a PMhx including poorly controlled diabetes, HTN, chronic anemia, recurrent left pleural effusion, paroxysmal atrial fibrillation/flutter, and HFpEF with pulmonary hypertension. He was hospitalized in early May with progressive dyspnea and due to recurrent (transudative) pleural effusion a left PleurX catheter was placed. He was diuresed with IV furosemide and given empiric abx therapy. Echo showed normal LVEF 55-60% with normal wall motion. The RV was moderately dilated with decreased systolic function and mild TR. He underwent a RHC during that stay that showed mild PH (PA pressure of 28 mmHg (51/15), mean RA of 4mmHg, and mildly elevated filling pressures with a wedge of 12mmHg (Vwave 16). PVR was 3.9 Wood units and Carlos Manuel CO/CI was 4.09/2.23. With vasodilator challenge, his PVR normalized to 1.22 wood units. Therefore, he was deemed to have pulmonary hypertension out of proportion to his left heart disease. He was started on Cialis 5mg daily but because this was not a formulated preparation, was switched to sildenafil 20 mg 3 times daily and discharged home. Interestingly, he was not discharged on any diuretic therapy. Discharge wt was 152 lbs. His admission was complicated by atrial flutter for which he was given amiodarone but because of prolongation in QTc, this was discontinued, and he was continued on his PTA verapamil.       Following discharge he developed symptomatic hypotension and via phone was told to stop the  "sildenafil. He then saw Dr. Chad gusman for PH evaluation a few days later on 5/18. He reported hypertension with SBP >200mmHg and weight was up 12 lbs from discharge. Torsemide 10 mg daily, Losartan 25 mg daily, and KCL 40 mEq were all started. She recommended resuming sildenafil once he was felt to be euvolemic. He was referred to the [facility] for an in person visit for close monitoring in attempt to prevent readmission. He was to see Ame Mejía PA-C on 5/21. He called to cancel the appt because he wasn't feeling well (nausea) and thus instead a phone visit was conducted. She had a difficult time assessing him via phone, though reported losing 10 lbs at home. Ame agreed that an in -person visit was necessary in order to adequately assess him.     However, later that day he presented back to the ED with shortness of breath and having run out of insulin in his pump. He was hospitalized again from 5/21 to 5/29. There was concern regarding his overall cognition vs confusion due to fluctuating blood sugars. It was noted that the medications he was truly taking were much different than what we had been attempting to adjust. His insulin pump was removed and regimen adjusted. A repeat chest CT showed no pneumonia and improvement in his effusions since placement of his pleurex. He again required IV lasix and was discharged on Lasix 40mg BID in place of the torsemide. Due to renal concerns, his hydrochlorothiazide and losartan were held. Fortunately, home health was arranged to help him manage his medications at home.     I saw Mr. Garcia in the [facility] in person on 6/5/2020. His weight on our scale was down to 148 lbs though he felt that his lower extremity edema was \"about the same.\"  The home health nurse has been coming frequently to help with his medications and he endorses compliance. Unfortunately, blood sugars continued to remain poorly controlled. Labs at that visit showed a slight increase in his SCr " "to 1.66 and I asked him to reduce his lasix to 40mg daily. In addition, we discussed a plan to resume sildenafil. Unfortunately, his home care nurse called a few days later to report a weight gain of 5 lbs in 3-4 days. Thus, we asked him to go back to 40mg BID for his lasix. He followed up with nephrology shortly after, and iron infusions were recommended. No changes were made to his diuretic regimen.    Today, I am seeing Tc again in the [facility] for close follow up. Today, his weight is back up to 156 lbs, though he says he did go back to the Lasix 40mg BID. Despite this, he tells me he's feeling much better overall. He had an iron infusion yesterday and notes more energy. Again, he says swelling is \"about the same.\" He saw his endocrine team last week and states they are adjusting his insulin regimen. His home health nurse continues to help with medication compliance and daily weights. He still tries to drain his pleurex catheter but has not gotten out in a fluid in several days. Upon questioning, he is not wearing compression socks daily, and I believe he continues with a fairly high salt diet. He tells me today he ate \"a lot of watermelon and salted it\" this morning which is why he thinks his weight is up, as he tells me he was 151 lbs at home. He also on occasion eats canned vegetables and fruits, prepackaged meals, and puts salt on his eggs and hashbrowns in the morning.     His labs today, show that his SCr has gone up a bit further to 1.89 (renal reports baseline 1.4-1.6). His BUN is 18, and electrolytes were acceptable. NT-proBNP was 2020, though this is down from 5-7K while in the hospital in May. His hemoglobin remained low at 8.9, though this is stable from previous (receiving iron infusions).      Assessment/Plan:    1. Acute on chronic HFpEF.   --Overall, his situation has been complex. His most recent echo from May 2020 shows preserved EF 55-60%, with normal wall motion. RV was mild to " moderately dilated with mildly decreased RV function. RHC during his hospital stay in May as below.    --I am still not entirely clear on his dry/goal weight. Today it is back up to 156 lbs on our scale (from 148 lbs last visit) and Scr has gone up further. I am suspicious that his high salt diet is playing a role. I spent a lot of time educating him on a low sodium diet today, and handouts were provided. I also asked him to wear the compression socks daily. Labs today show a bump in his SCr, though NT-proBNP is down from his hospital stay. For now, I made no diuretic changes. We will check back in with him later this week.    -- Fortunately, he now has a home health nurse to assist with medication preparation so hopefully this will help in ongoing management. I've asked him to follow up with endocrine and nephrology as directed as well.   --It does not appear that he has any follow up arranged for management of his pleurex. He endorses little to no drainage in his pleurex over the last week, so will request a pulmonary referral to assist with further management.     2. Pulmonary hypertension.   --RHC in May 2020 showed PA pressure of 28 mmHg (51/15), mean RA of 4mmHg, and mildly elevated filling pressures with a wedge of 12mmHg (Vwave 16). PVR was 3.9 Wood units and Carlos Manuel CO/CI was 4.09/2.23. With vasodilator challenge, his PVR normalized to 1.22 wood units. Therefore, he was deemed to have pulmonary hypertension out of proportion to his left heart disease.   --As per Dr. Bonilla's recommendations, once he is euvolemic, we will plan to slowly reintroduce sildenafil. Will begin at 20 mg once daily and gradually up-titrate at 2 or 3-week intervals to b.i.d. and then 3 weeks later to t.i.d. Given ongoing issues with weight fluctuations, however, will continue to hold on this for now.    --In the future, when felt safe from a COVID standpoint, he could benefit from pulmonary rehab.     3. Paroxysmal atrial  fibrillation/flutter.   --Heart rates under good control again today and on exam he appears to remain in sinus rhythm. He is not currently on beta blockers, and remains on verapamil which I believe has been longstanding for him. Had a prolonged QTC on amiodarone in the past.    --He is on anticoagulation with apixaban 2.5mg BID due to age and renal function.     4. Hypertension.   --BP up again today but he reports somewhat lower at home. Will continue to monitor with medication adjustments, and this may come down with initiation of sildenafil as above. However, there are plans to eventually reintroduce losartan in the future as per nephrology.       Follow up plan: Will try to arrange for return in person visits again with myself or Ame Mejía PA-C in ~2 weeks with labs in attempts to prevent hospital readmission.       Orders this Visit:  Orders Placed This Encounter   Procedures     Basic metabolic panel     N terminal pro BNP outpatient     Follow-Up with [facility] visit     No orders of the defined types were placed in this encounter.    There are no discontinued medications.      CURRENT MEDICATIONS:  Current Outpatient Medications   Medication Sig Dispense Refill     acetaminophen (TYLENOL) 325 MG tablet Take 2 tablets (650 mg) by mouth every 4 hours as needed for mild pain  0     apixaban ANTICOAGULANT (ELIQUIS) 5 MG tablet Take 1/2 tablet (2.5mg) by mouth twice daily. You will have your labs checked on 5/4/20 and your PCP will direct you when it is safe to increase your dose back to 1 tablet (5mg) twice daily.       furosemide (LASIX) 40 MG tablet Take 1 tablet (40 mg) by mouth 2 times daily 180 tablet 0     glucagon 1 MG kit 1 mg as needed for low blood sugar       insulin aspart (NOVOLOG PEN) 100 UNIT/ML pen Inject 8 units subcutaneous with breakfast, 8 units subcutaneous with lunch and 4 units with supper. 3 mL 0     insulin glargine (LANTUS PEN) 100 UNIT/ML pen Inject 10 Units Subcutaneous  every morning 3 mL 0     LOVASTATIN 20 MG PO TABS 1 TABLET DAILY AT DINNER 90 Tab 0     polyethylene glycol (MIRALAX) 17 g packet Take 17 g by mouth 2 times daily 60 packet 0     potassium chloride ER (KLOR-CON M) 10 MEQ CR tablet Take 1 tablet (10 mEq) by mouth 2 times daily 60 tablet 0     tiotropium (SPIRIVA) 18 MCG inhaled capsule Inhale 18 mcg into the lungs daily       verapamil ER (VERELAN) 240 MG 24 hr capsule Take 1 capsule (240 mg) by mouth At Bedtime       nystatin (MYCOSTATIN) 627375 UNIT/GM external cream Apply topically 2 times daily as needed          ALLERGIES     Allergies   Allergen Reactions     No Known Drug Allergies        PAST MEDICAL HISTORY:  Past Medical History:   Diagnosis Date     Anemia      Anemia of chronic disease 5/14/2020     CKD (chronic kidney disease) stage 3, GFR 30-59 ml/min (H)      CRF (chronic renal failure), stage 3  5/14/2020     Fall 11/2019    suffered multiple left rib fractures, C3 and T2 fractures     Mixed hyperlipidemia 7/7/2004     Paroxysmal atrial fibrillation (H)      Personal history of colonic polyps 1972    gets colonoscopy every five years, due in 2006     Pleural effusion on left 11/2019    after multiple rib fractures     Pulmonary hypertension (H) 5/10/2020    Added automatically from request for surgery [id number]     Recurrent Left Pleural effusion -- S/P Pleurex Cath 5/12/20 12/30/2019     Rosacea 1989     Type II or unspecified type diabetes mellitus without mention of complication, not stated as uncontrolled 1999     Unspecified essential hypertension 1994       PAST SURGICAL HISTORY:  Past Surgical History:   Procedure Laterality Date     ANESTHESIA CARDIOVERSION N/A 2/3/2020    Procedure: ANESTHESIA, FOR CARDIOVERSION;  Surgeon: GENERIC ANESTHESIA PROVIDER;  Location:  OR     CV RIGHT HEART CATH N/A 5/13/2020    Procedure: Right Heart Cath;  Surgeon: Senthil Silva MD;  Location:  HEART CARDIAC CATH LAB     HC REMOVE TONSILS/ADENOIDS,<12  Y/O      T & A <11y.o.     IR CHEST TUBE DRAIN TUNNELED LEFT  2020       FAMILY HISTORY:  Family History   Problem Relation Age of Onset     Family History Negative Mother          age 71     Family History Negative Father          age 70     Diabetes Brother         alive age 77     Diabetes Brother         alive age 76     Family History Negative Brother              Family History Negative Brother              Diabetes Sister         alive age76     Family History Negative Sister          age 86     Heart Disease Sister              Family History Negative Sister              Family History Negative Sister              Diabetes Sister      Diabetes Sister      Diabetes Brother      Diabetes Brother        SOCIAL HISTORY:  Social History     Socioeconomic History     Marital status:      Spouse name: Lianna     Number of children: 4     Years of education: 16     Highest education level: None   Occupational History     Occupation: [employer]     Employer: [employer]    Social Needs     Financial resource strain: None     Food insecurity     Worry: None     Inability: None     Transportation needs     Medical: None     Non-medical: None   Tobacco Use     Smoking status: Former Smoker     Packs/day: 0.20     Years: 40.00     Pack years: 8.00     Types: Cigarettes     Last attempt to quit: 2008     Years since quittin.4     Smokeless tobacco: Never Used     Tobacco comment: occasional   Substance and Sexual Activity     Alcohol use: Not Currently     Alcohol/week: 35.0 standard drinks     Drug use: Not Currently     Sexual activity: None   Lifestyle     Physical activity     Days per week: None     Minutes per session: None     Stress: None   Relationships     Social connections     Talks on phone: None     Gets together: None     Attends Tenriism service: None     Active member of club or organization: None     Attends  "meetings of clubs or organizations: None     Relationship status: None     Intimate partner violence     Fear of current or ex partner: None     Emotionally abused: None     Physically abused: None     Forced sexual activity: None   Other Topics Concern     Parent/sibling w/ CABG, MI or angioplasty before 65F 55M? Not Asked   Social History Narrative     None       Review of Systems:  Cardiovascular: negative for chest pain, palpitations, orthopnea, pos LE edema (unchanged)  Constitutional: negative for chills, sweats, fevers. Pos fatigue, improved.  Resp: Negative for dyspnea at rest, pos dyspnea on exertion, improved, neg cough, wheezing, pos known lung disease/recurrent pleural effusion.   HEENT: Negative for new visual changes, frequent headaches.   Gastrointestinal: negative for abdominal pain, diarrhea, vomiting.  Hematologic/lymphatic: pos for current systemic anticoagulation  Neurological: negative for focal weakness, LOC, seizures, syncope      Physical Exam:  Vitals: BP (!) 161/87   Pulse 86   Ht 1.778 m (5' 10\")   Wt 70.8 kg (156 lb)   SpO2 100%   BMI 22.38 kg/m     Wt Readings from Last 4 Encounters:   06/16/20 70.8 kg (156 lb)   06/05/20 67.5 kg (148 lb 12.8 oz)   05/29/20 69.4 kg (153 lb)   05/21/20 70.3 kg (155 lb)       GEN:  In general, this is a well nourished elderly male in no acute distress on room air.  Patient ambulatory, unaccompanied.   HEENT:  Pupils equal, round. Sclerae nonicteric.   NECK: Supple, no masses appreciated. Trachea midline. No significant JVD while upright.   C/V:  Regular rate and rhythm, 1/6 ELIZABETH heard over LSB. No rub or gallop. Mildly accentuated P2 but no S3 or RV heave.   RESP: Respirations are unlabored. No use of accessory muscles. Diminished left base but no active wheezing, or rhonchi.  GI: Abdomen soft, nontender, not significantly distended.   EXTREM: Still 1+ ankle bilateral edema, not much change from last visit. No cyanosis or clubbing.  NEURO: Alert and " oriented, cooperative. Gait not assessed. No obvious focal deficits.   PSYCH: Normal affect during my visit, answered my questions appropriately.   SKIN: Warm and dry. No rashes or petechiae appreciated.     Recent Lab Results:  LIPID RESULTS:  Lab Results   Component Value Date    CHOL 116 05/13/2020    HDL 60 05/13/2020    LDL 43 05/13/2020    TRIG 65 05/13/2020             CBC RESULTS:  Lab Results   Component Value Date                HGB 8.9 (L) 06/16/2020       BMP RESULTS:  Lab Results   Component Value Date     06/16/2020    POTASSIUM 4.0 06/16/2020    CHLORIDE 100 06/16/2020    CO2 30 (H) 06/16/2020    ANIONGAP 10 06/16/2020     (H) 06/16/2020    BUN 18 06/16/2020    CR 1.89 (H) 06/16/2020    GFRESTIMATED 34 (L) 06/16/2020    GFRESTBLACK 42 (L) 06/16/2020    LLOYD 9.1 06/16/2020        Additional pertinent testing:  Echo 5/11/2020     Interpretation Summary     1. Left ventricular systolic function is normal. The visual ejection fraction  is estimated at 55-60%.  2. No regional wall motion abnormalities noted.  3. The right ventricle is mild to moderately dilated. Mildly decreased right  ventricular systolic function  4. There is mild (1+) tricuspid regurgitation.  5. Moderate pulmonary hypertension; The right ventricular systolic pressure is  approximated at 47mmHg plus the right atrial pressure. IVC diameter and  respiratory changes fall into an intermediate range suggesting an RA pressure  of 8 mmHg.  6. Left pleural effusion is present.  7. In comparison with the prior report, estimated pulmonary pressures have  increased somewhat, otherwise, no significant change    Greater than 40 minutes was spent with this patient, >50% of which was spent in counseling and coordination of care.       Katlin Fontanez PA-C  [facility]  Pager [phone]      Thank you for allowing me to participate in the care of your patient.      Sincerely,     FORTUNATO Reynoso     [facility]  Care    cc:   Jada Hazel, APRN CNP  [address]  [place] [address]

## 2020-06-16 NOTE — PROGRESS NOTES
Cardiology Progress Note    Date of Service: 06/16/2020      Reason for visit: Follow up pulmonary hypertension, HFpEF.    Cardiology team: Dr. Julien (general), Dr. Camejo (EP), Dr. Bonilla (PH), Annette Mejía PA-C ([facility])      HPI:  Mr. Garcia is an 80 year old male with a PMhx including poorly controlled diabetes, HTN, chronic anemia, recurrent left pleural effusion, paroxysmal atrial fibrillation/flutter, and HFpEF with pulmonary hypertension. He was hospitalized in early May with progressive dyspnea and due to recurrent (transudative) pleural effusion a left PleurX catheter was placed. He was diuresed with IV furosemide and given empiric abx therapy. Echo showed normal LVEF 55-60% with normal wall motion. The RV was moderately dilated with decreased systolic function and mild TR. He underwent a RHC during that stay that showed mild PH (PA pressure of 28 mmHg (51/15), mean RA of 4mmHg, and mildly elevated filling pressures with a wedge of 12mmHg (Vwave 16). PVR was 3.9 Wood units and Carlos Manuel CO/CI was 4.09/2.23. With vasodilator challenge, his PVR normalized to 1.22 wood units. Therefore, he was deemed to have pulmonary hypertension out of proportion to his left heart disease. He was started on Cialis 5mg daily but because this was not a formulated preparation, was switched to sildenafil 20 mg 3 times daily and discharged home. Interestingly, he was not discharged on any diuretic therapy. Discharge wt was 152 lbs. His admission was complicated by atrial flutter for which he was given amiodarone but because of prolongation in QTc, this was discontinued, and he was continued on his PTA verapamil.       Following discharge he developed symptomatic hypotension and via phone was told to stop the sildenafil. He then saw Dr. Chad gusman for PH evaluation a few days later on 5/18. He reported hypertension with SBP >200mmHg and weight was up 12 lbs from discharge. Torsemide 10 mg daily, Losartan 25 mg daily, and KCL 40 mEq  "were all started. She recommended resuming sildenafil once he was felt to be euvolemic. He was referred to [facility] for an in person visit for close monitoring in attempt to prevent readmission. He was to see Ame Mejía PA-C on 5/21. He called to cancel the appt because he wasn't feeling well (nausea) and thus instead a phone visit was conducted. She had a difficult time assessing him via phone, though reported losing 10 lbs at home. Ame agreed that an in -person visit was necessary in order to adequately assess him.     However, later that day he presented back to the ED with shortness of breath and having run out of insulin in his pump. He was hospitalized again from 5/21 to 5/29. There was concern regarding his overall cognition vs confusion due to fluctuating blood sugars. It was noted that the medications he was truly taking were much different than what we had been attempting to adjust. His insulin pump was removed and regimen adjusted. A repeat chest CT showed no pneumonia and improvement in his effusions since placement of his pleurex. He again required IV lasix and was discharged on Lasix 40mg BID in place of the torsemide. Due to renal concerns, his hydrochlorothiazide and losartan were held. Fortunately, home health was arranged to help him manage his medications at home.     I saw Mr. Garcia in the [facility] in person on 6/5/2020. His weight on our scale was down to 148 lbs though he felt that his lower extremity edema was \"about the same.\"  The home health nurse has been coming frequently to help with his medications and he endorses compliance. Unfortunately, blood sugars continued to remain poorly controlled. Labs at that visit showed a slight increase in his SCr to 1.66 and I asked him to reduce his lasix to 40mg daily. In addition, we discussed a plan to resume sildenafil. Unfortunately, his home care nurse called a few days later to report a weight gain of 5 lbs in 3-4 days. Thus, we " "asked him to go back to 40mg BID for his lasix. He followed up with nephrology shortly after, and iron infusions were recommended. No changes were made to his diuretic regimen.    Today, I am seeing Tc again in the [facility] for close follow up. Today, his weight is back up to 156 lbs, though he says he did go back to the Lasix 40mg BID. Despite this, he tells me he's feeling much better overall. He had an iron infusion yesterday and notes more energy. Again, he says swelling is \"about the same.\" He saw his endocrine team last week and states they are adjusting his insulin regimen. His home health nurse continues to help with medication compliance and daily weights. He still tries to drain his pleurex catheter but has not gotten out in a fluid in several days. Upon questioning, he is not wearing compression socks daily, and I believe he continues with a fairly high salt diet. He tells me today he ate \"a lot of watermelon and salted it\" this morning which is why he thinks his weight is up, as he tells me he was 151 lbs at home. He also on occasion eats canned vegetables and fruits, prepackaged meals, and puts salt on his eggs and hashbrowns in the morning.     His labs today, show that his SCr has gone up a bit further to 1.89 (renal reports baseline 1.4-1.6). His BUN is 18, and electrolytes were acceptable. NT-proBNP was 2020, though this is down from 5-7K while in the hospital in May. His hemoglobin remained low at 8.9, though this is stable from previous (receiving iron infusions).      Assessment/Plan:    1. Acute on chronic HFpEF.   --Overall, his situation has been complex. His most recent echo from May 2020 shows preserved EF 55-60%, with normal wall motion. RV was mild to moderately dilated with mildly decreased RV function. RHC during his hospital stay in May as below.    --I am still not entirely clear on his dry/goal weight. Today it is back up to 156 lbs on our scale (from 148 lbs last visit) and Scr " has gone up further. I am suspicious that his high salt diet is playing a role. I spent a lot of time educating him on a low sodium diet today, and handouts were provided. I also asked him to wear the compression socks daily. Labs today show a bump in his SCr, though NT-proBNP is down from his hospital stay. For now, I made no diuretic changes. We will check back in with him later this week.    -- Fortunately, he now has a home health nurse to assist with medication preparation so hopefully this will help in ongoing management. I've asked him to follow up with endocrine and nephrology as directed as well.   --It does not appear that he has any follow up arranged for management of his pleurex. He endorses little to no drainage in his pleurex over the last week, so will request a pulmonary referral to assist with further management.     2. Pulmonary hypertension.   --RHC in May 2020 showed PA pressure of 28 mmHg (51/15), mean RA of 4mmHg, and mildly elevated filling pressures with a wedge of 12mmHg (Vwave 16). PVR was 3.9 Wood units and Carlos Manuel CO/CI was 4.09/2.23. With vasodilator challenge, his PVR normalized to 1.22 wood units. Therefore, he was deemed to have pulmonary hypertension out of proportion to his left heart disease.   --As per Dr. Bonilla's recommendations, once he is euvolemic, we will plan to slowly reintroduce sildenafil. Will begin at 20 mg once daily and gradually up-titrate at 2 or 3-week intervals to b.i.d. and then 3 weeks later to t.i.d. Given ongoing issues with weight fluctuations, however, will continue to hold on this for now.    --In the future, when felt safe from a COVID standpoint, he could benefit from pulmonary rehab.     3. Paroxysmal atrial fibrillation/flutter.   --Heart rates under good control again today and on exam he appears to remain in sinus rhythm. He is not currently on beta blockers, and remains on verapamil which I believe has been longstanding for him. Had a prolonged QTC on  amiodarone in the past.    --He is on anticoagulation with apixaban 2.5mg BID due to age and renal function.     4. Hypertension.   --BP up again today but he reports somewhat lower at home. Will continue to monitor with medication adjustments, and this may come down with initiation of sildenafil as above. However, there are plans to eventually reintroduce losartan in the future as per nephrology.       Follow up plan: Will try to arrange for return in person visits again with myself or Ame Mejía PA-C in ~2 weeks with labs in attempts to prevent hospital readmission.       Orders this Visit:  Orders Placed This Encounter   Procedures     Basic metabolic panel     N terminal pro BNP outpatient     Follow-Up with [facility] visit     No orders of the defined types were placed in this encounter.    There are no discontinued medications.      CURRENT MEDICATIONS:  Current Outpatient Medications   Medication Sig Dispense Refill     acetaminophen (TYLENOL) 325 MG tablet Take 2 tablets (650 mg) by mouth every 4 hours as needed for mild pain  0     apixaban ANTICOAGULANT (ELIQUIS) 5 MG tablet Take 1/2 tablet (2.5mg) by mouth twice daily. You will have your labs checked on 5/4/20 and your PCP will direct you when it is safe to increase your dose back to 1 tablet (5mg) twice daily.       furosemide (LASIX) 40 MG tablet Take 1 tablet (40 mg) by mouth 2 times daily 180 tablet 0     glucagon 1 MG kit 1 mg as needed for low blood sugar       insulin aspart (NOVOLOG PEN) 100 UNIT/ML pen Inject 8 units subcutaneous with breakfast, 8 units subcutaneous with lunch and 4 units with supper. 3 mL 0     insulin glargine (LANTUS PEN) 100 UNIT/ML pen Inject 10 Units Subcutaneous every morning 3 mL 0     LOVASTATIN 20 MG PO TABS 1 TABLET DAILY AT DINNER 90 Tab 0     polyethylene glycol (MIRALAX) 17 g packet Take 17 g by mouth 2 times daily 60 packet 0     potassium chloride ER (KLOR-CON M) 10 MEQ CR tablet Take 1 tablet (10 mEq) by  mouth 2 times daily 60 tablet 0     tiotropium (SPIRIVA) 18 MCG inhaled capsule Inhale 18 mcg into the lungs daily       verapamil ER (VERELAN) 240 MG 24 hr capsule Take 1 capsule (240 mg) by mouth At Bedtime       nystatin (MYCOSTATIN) 367899 UNIT/GM external cream Apply topically 2 times daily as needed          ALLERGIES     Allergies   Allergen Reactions     No Known Drug Allergies        PAST MEDICAL HISTORY:  Past Medical History:   Diagnosis Date     Anemia      Anemia of chronic disease 2020     CKD (chronic kidney disease) stage 3, GFR 30-59 ml/min (H)      CRF (chronic renal failure), stage 3  2020     Fall 2019    suffered multiple left rib fractures, C3 and T2 fractures     Mixed hyperlipidemia 2004     Paroxysmal atrial fibrillation (H)      Personal history of colonic polyps     gets colonoscopy every five years, due in      Pleural effusion on left 2019    after multiple rib fractures     Pulmonary hypertension (H) 5/10/2020    Added automatically from request for surgery [id number]     Recurrent Left Pleural effusion -- S/P Pleurex Cath 2019     Rosacea      Type II or unspecified type diabetes mellitus without mention of complication, not stated as uncontrolled      Unspecified essential hypertension        PAST SURGICAL HISTORY:  Past Surgical History:   Procedure Laterality Date     ANESTHESIA CARDIOVERSION N/A 2/3/2020    Procedure: ANESTHESIA, FOR CARDIOVERSION;  Surgeon: GENERIC ANESTHESIA PROVIDER;  Location:  OR      RIGHT HEART CATH N/A 2020    Procedure: Right Heart Cath;  Surgeon: Senthil Silva MD;  Location:  HEART CARDIAC CATH LAB     HC REMOVE TONSILS/ADENOIDS,<11 Y/O      T & A <11y.o.     IR CHEST TUBE DRAIN TUNNELED LEFT  2020       FAMILY HISTORY:  Family History   Problem Relation Age of Onset     Family History Negative Mother          age 71     Family History Negative Father          age  70     Diabetes Brother         alive age 77     Diabetes Brother         alive age 76     Family History Negative Brother              Family History Negative Brother              Diabetes Sister         alive age74     Family History Negative Sister          age 86     Heart Disease Sister              Family History Negative Sister              Family History Negative Sister              Diabetes Sister      Diabetes Sister      Diabetes Brother      Diabetes Brother        SOCIAL HISTORY:  Social History     Socioeconomic History     Marital status:      Spouse name: Lianna     Number of children: 4     Years of education: 16     Highest education level: None   Occupational History     Occupation: [employer]     Employer: [employer]    Social Needs     Financial resource strain: None     Food insecurity     Worry: None     Inability: None     Transportation needs     Medical: None     Non-medical: None   Tobacco Use     Smoking status: Former Smoker     Packs/day: 0.20     Years: 40.00     Pack years: 8.00     Types: Cigarettes     Last attempt to quit: 2008     Years since quittin.4     Smokeless tobacco: Never Used     Tobacco comment: occasional   Substance and Sexual Activity     Alcohol use: Not Currently     Alcohol/week: 35.0 standard drinks     Drug use: Not Currently     Sexual activity: None   Lifestyle     Physical activity     Days per week: None     Minutes per session: None     Stress: None   Relationships     Social connections     Talks on phone: None     Gets together: None     Attends Sikhism service: None     Active member of club or organization: None     Attends meetings of clubs or organizations: None     Relationship status: None     Intimate partner violence     Fear of current or ex partner: None     Emotionally abused: None     Physically abused: None     Forced sexual activity: None   Other Topics Concern  "    Parent/sibling w/ CABG, MI or angioplasty before 65F 55M? Not Asked   Social History Narrative     None       Review of Systems:  Cardiovascular: negative for chest pain, palpitations, orthopnea, pos LE edema (unchanged)  Constitutional: negative for chills, sweats, fevers. Pos fatigue, improved.  Resp: Negative for dyspnea at rest, pos dyspnea on exertion, improved, neg cough, wheezing, pos known lung disease/recurrent pleural effusion.   HEENT: Negative for new visual changes, frequent headaches.   Gastrointestinal: negative for abdominal pain, diarrhea, vomiting.  Hematologic/lymphatic: pos for current systemic anticoagulation  Neurological: negative for focal weakness, LOC, seizures, syncope      Physical Exam:  Vitals: BP (!) 161/87   Pulse 86   Ht 1.778 m (5' 10\")   Wt 70.8 kg (156 lb)   SpO2 100%   BMI 22.38 kg/m     Wt Readings from Last 4 Encounters:   06/16/20 70.8 kg (156 lb)   06/05/20 67.5 kg (148 lb 12.8 oz)   05/29/20 69.4 kg (153 lb)   05/21/20 70.3 kg (155 lb)       GEN:  In general, this is a well nourished elderly male in no acute distress on room air.  Patient ambulatory, unaccompanied.   HEENT:  Pupils equal, round. Sclerae nonicteric.   NECK: Supple, no masses appreciated. Trachea midline. No significant JVD while upright.   C/V:  Regular rate and rhythm, 1/6 ELIZABETH heard over LSB. No rub or gallop. Mildly accentuated P2 but no S3 or RV heave.   RESP: Respirations are unlabored. No use of accessory muscles. Diminished left base but no active wheezing, or rhonchi.  GI: Abdomen soft, nontender, not significantly distended.   EXTREM: Still 1+ ankle bilateral edema, not much change from last visit. No cyanosis or clubbing.  NEURO: Alert and oriented, cooperative. Gait not assessed. No obvious focal deficits.   PSYCH: Normal affect during my visit, answered my questions appropriately.   SKIN: Warm and dry. No rashes or petechiae appreciated.     Recent Lab Results:  LIPID RESULTS:  Lab Results "   Component Value Date    CHOL 116 05/13/2020    HDL 60 05/13/2020    LDL 43 05/13/2020    TRIG 65 05/13/2020             CBC RESULTS:  Lab Results   Component Value Date                HGB 8.9 (L) 06/16/2020       BMP RESULTS:  Lab Results   Component Value Date     06/16/2020    POTASSIUM 4.0 06/16/2020    CHLORIDE 100 06/16/2020    CO2 30 (H) 06/16/2020    ANIONGAP 10 06/16/2020     (H) 06/16/2020    BUN 18 06/16/2020    CR 1.89 (H) 06/16/2020    GFRESTIMATED 34 (L) 06/16/2020    GFRESTBLACK 42 (L) 06/16/2020    LLOYD 9.1 06/16/2020        Additional pertinent testing:  Echo 5/11/2020     Interpretation Summary     1. Left ventricular systolic function is normal. The visual ejection fraction  is estimated at 55-60%.  2. No regional wall motion abnormalities noted.  3. The right ventricle is mild to moderately dilated. Mildly decreased right  ventricular systolic function  4. There is mild (1+) tricuspid regurgitation.  5. Moderate pulmonary hypertension; The right ventricular systolic pressure is  approximated at 47mmHg plus the right atrial pressure. IVC diameter and  respiratory changes fall into an intermediate range suggesting an RA pressure  of 8 mmHg.  6. Left pleural effusion is present.  7. In comparison with the prior report, estimated pulmonary pressures have  increased somewhat, otherwise, no significant change    Greater than 40 minutes was spent with this patient, >50% of which was spent in counseling and coordination of care.       Katlin Fontanez PA-C  [facility]  Pager [phone]

## 2020-06-17 ENCOUNTER — TELEPHONE (OUTPATIENT)
Dept: CARDIOLOGY | Facility: CLINIC | Age: 80
End: 2020-06-17

## 2020-06-17 NOTE — TELEPHONE ENCOUNTER
----- Message from FORTUNATO Reynoso sent at 6/16/2020  4:05 PM CDT -----  Regarding: call fri, 2 week follow up  Visit today:  I did NOT yet initiate sildenafil as weight is back up.   I am going to touch base with nephrology before I adjust his diuretics further for now.     I may make some changes via phone before then, but for now, please just arrange for another in person visit with either me or reynaldo CABA in [facility] in person in ~2 weeks with labs again. I would also like you to call him on Friday and get an update on his weight/swelling because I asked him to be strict on compression sock use and salt restriction the next few days to see if this helps first.     Thanks  Fracisco Reynoso PA [place], [place] Mary, AUGUSTIN               Also--please place a pulmonary referral to assist with management of his pleurex catheter post discharge.     Thanks   fracisco      ============================    Call to patient- no anwer - will call Friday for update.  Corin Noble RN 06/17/20 2:51 PM    Call to patient - states he is feeing fine, he is winded from walking to the mailbox which he reports as normal for him, he notes no changes in swelling but not worse, wearing compression socks daily, annd he did cut way back on salt and has noted no changes yet from that dietary change.     Home Weights:    6/19/2020 156 lb   6/18          154.5  6/17          156      Patient is followed by [facility] for Pulmonary [phone]. Patient states they are aware he has Pleurex catheter at his recent visit and that his inhalers were refilled. Called to [facility] - in past patient had several visits with Dr Gonzalez, who is now seeing only in hospital patients till august. Patient was seen 6/5/2020 by DR Philip Rutledge with plan for return visit in 1 month  - ANISH Reaves will be calling patient for return visit and will be faxing us recent records.     Future Appointments   Date Time Provider Department Center   6/22/2020  2:30 PM SH INFUSION  CHAIR 7 [facility]   7/8/2020  9:40 AM [facility]   7/8/2020 10:10 AM Katlin Fontanez PA RUUMHT [facility]     Corin Noble RN 06/19/2020 3:47 PM

## 2020-06-21 ENCOUNTER — TELEPHONE (OUTPATIENT)
Dept: INFUSION THERAPY | Facility: CLINIC | Age: 80
End: 2020-06-21

## 2020-06-21 NOTE — TELEPHONE ENCOUNTER
"Patient is currently scheduled for an appointment at [facility] in [place].  Called patient to review current visitor restrictions and complete COVID-19 Patient Infection/Travel Screening Tool.     Due to the recent public health concerns around COVID-19 and in an effort to keep our patients and staff safe and healthy, we are implementing a screening process for the patients that come to our clinic.      I am going to ask you a few questions, please answer yes or no.  Your honesty about any symptoms is critical, as it keeps patients and staff healthy.      Do you have a:  Fever (or reported chills)?  No  New cough (started within the past 14 days)?  No  New shortness of breath (started within the past 14 days)?  No  Rash?  No    In the last month, have you been in contact with someone who was confirmed or suspected to have Coronavirus/COVID-19?  No    Have you traveled internationally in the last month?  No  If so, where?  N/A     I also wanted to let you know that to protect our patients from the flu and other common illnesses, [facility] enforce visitor restrictions year round, but due to the community spread of COVID-19 in [place], we are taking additional precautionary steps to ensure the health of our patients.  At this time, NO visitors are allowed on our hospital and clinic campuses.     Patient PASSED the screening assessment.    Patient instructed to come to the clinic as planned for their scheduled appointment and to call the clinic if any symptoms develop prior to their appointment.    \"Per CDC Guidelines, we are asking all patients that are coming into the building to wear a cloth covering that covers your mouth and nose.  You will be screened again at the entrance to the clinic for any Covid 19 symptoms. If you screen positive to any Covid 19 symptoms during our screening process you will be provided a surgical mask to wear during your time in the " "building.\"    \"COVID-19 is contagious and can be dangerous for our patients and staff.  Please send us a G-mode message or call our clinic before coming in if you feel any of the following symptoms: fever, cough, congestion, runny nose, sore throat, muscle aches and pains, or shortness of breath.  If you are already at our clinic, it is very important that you be honest about any symptoms you are experiencing to ensure your safety and that of other patients and staff who treat you.  If you do have symptoms, we will have a nurse and/or provider asses you to determine next steps.\"    Ame Peterson RN on 6/21/2020 at 11:34 AM      "

## 2020-06-22 ENCOUNTER — INFUSION THERAPY VISIT (OUTPATIENT)
Dept: INFUSION THERAPY | Facility: CLINIC | Age: 80
End: 2020-06-22
Attending: NURSE PRACTITIONER
Payer: COMMERCIAL

## 2020-06-22 VITALS
TEMPERATURE: 98.2 F | SYSTOLIC BLOOD PRESSURE: 163 MMHG | OXYGEN SATURATION: 96 % | RESPIRATION RATE: 20 BRPM | HEART RATE: 62 BPM | DIASTOLIC BLOOD PRESSURE: 78 MMHG

## 2020-06-22 DIAGNOSIS — D63.1 ANEMIA IN STAGE 3 CHRONIC KIDNEY DISEASE (H): Primary | ICD-10-CM

## 2020-06-22 DIAGNOSIS — N18.30 ANEMIA IN STAGE 3 CHRONIC KIDNEY DISEASE (H): Primary | ICD-10-CM

## 2020-06-22 DIAGNOSIS — N18.30 CRF (CHRONIC RENAL FAILURE), STAGE 3 (MODERATE) (H): ICD-10-CM

## 2020-06-22 PROCEDURE — 96374 THER/PROPH/DIAG INJ IV PUSH: CPT

## 2020-06-22 PROCEDURE — 25800030 ZZH RX IP 258 OP 636: Performed by: NURSE PRACTITIONER

## 2020-06-22 PROCEDURE — 25000128 H RX IP 250 OP 636: Performed by: NURSE PRACTITIONER

## 2020-06-22 RX ORDER — HEPARIN SODIUM,PORCINE 10 UNIT/ML
5 VIAL (ML) INTRAVENOUS
Status: CANCELLED | OUTPATIENT
Start: 2020-06-22

## 2020-06-22 RX ORDER — HEPARIN SODIUM (PORCINE) LOCK FLUSH IV SOLN 100 UNIT/ML 100 UNIT/ML
5 SOLUTION INTRAVENOUS
Status: CANCELLED | OUTPATIENT
Start: 2020-06-22

## 2020-06-22 RX ADMIN — SODIUM CHLORIDE 250 ML: 9 INJECTION, SOLUTION INTRAVENOUS at 14:51

## 2020-06-22 RX ADMIN — FERUMOXYTOL 510 MG: 510 INJECTION INTRAVENOUS at 14:51

## 2020-06-22 ASSESSMENT — PAIN SCALES - GENERAL: PAINLEVEL: NO PAIN (0)

## 2020-06-22 NOTE — PROGRESS NOTES
Infusion Nursing Note:  Tc Garcia presents today for Feraheme.    Patient seen by provider today: No.   present during visit today: Not Applicable.    Note: N/A.    Intravenous Access:  Peripheral IV placed.    Treatment Conditions:  Not Applicable.      Post Infusion Assessment:  Patient tolerated infusion without incident.  Patient observed for 30 minutes post Feraheme per protocol.  Blood return noted pre and post infusion.  Site patent and intact, free from redness, edema or discomfort.  No evidence of extravasations.  Access discontinued per protocol.       Discharge Plan:   Patient declined prescription refills.  Discharge instructions reviewed with: Patient.  Patient verbalized understanding of discharge instructions and all questions answered.  AVS to patient via TangledT.  Patient will return as scheduled for next appointment.   Patient discharged in stable condition accompanied by: self.  Departure Mode: Ambulatory.    Josefina Rojas RN

## 2020-06-23 NOTE — TELEPHONE ENCOUNTER
6/23/2020 Called Patient for updated weights since call on 6/19.    Post iron infusion yesterday 6/22 230 pm with vitals as below.    Vitals:     /78      Pulse 62    Temp 98.2  F (36.8  C) (Oral)    Resp 20    SpO2 96%      Prior recorded weights as follows:  Home Weights:    6/19/2020 156 lb   6/18          154.5  6/17          156      Update on home weights recorded today:  Home weights:  6/23/2020  154.5   Lbs and states has been 154.5 lbs 6/22. 6/21, and 6/20.    Patient reports feeling tired today, but other than that feeling well.    Current Diuretic dose: Patient confirms he is taking as directed since last visit, lasix 40 mg twice daily.    Future Appointments   Date Time Provider Department Center   7/8/2020  9:40 AM [facility]   7/8/2020 10:10 AM Katlin Fontanez PA [facility]     Update forwarded to TAMAR.  Corin Noble RN 06/23/20 1:40 PM    ================================    Katlin Fontanez PA McMahon, Bullis Mary, RN    Caller: Unspecified (1 week ago,  2:49 PM)               Ok, thanks.   I'm not going to change anything for not until I see him next but please ask him to be vigilant about calling if weight climbs up or he notes more swelling in the meantime.     Thanks      ===============================    Called to patient with TAMAR reply - he agrees to call if issues, weight gain, or concerns prior to scheduled appointments.  Corin Noble RN 06/24/202011:02 AM

## 2020-07-01 NOTE — TELEPHONE ENCOUNTER
"Call from patient asking \"who is managing my pleurex catheter?\"    Reviewed discussion form 6/17/2020 with the patient:  Patient is followed by [facility] for [facility] [phone]. Patient states they are aware he has Pleurex catheter at his recent visit and that his inhalers were refilled. Called to [facility] - in past patient had several visits with Dr Gonzalez, who is now seeing only in hospital patients till august. Patient was seen 6/5/2020 by DR Philip Rutledge with plan for return visit in 1 month  - ANISH Reaves will be calling patient for return visit and will be faxing us recent records.     Patient states he has not yet set up return visit with [facility] - number given to him to call.    Patient reports weights are \"stable\" and he denies complaints at this time.  Corin Noble RN 07/01/2020 1:11 PM      "

## 2020-07-07 ENCOUNTER — TELEPHONE (OUTPATIENT)
Dept: CARDIOLOGY | Facility: CLINIC | Age: 80
End: 2020-07-07

## 2020-07-07 NOTE — TELEPHONE ENCOUNTER

## 2020-07-08 ENCOUNTER — OFFICE VISIT (OUTPATIENT)
Dept: CARDIOLOGY | Facility: CLINIC | Age: 80
End: 2020-07-08
Payer: COMMERCIAL

## 2020-07-08 ENCOUNTER — APPOINTMENT (OUTPATIENT)
Dept: GENERAL RADIOLOGY | Facility: CLINIC | Age: 80
DRG: 291 | End: 2020-07-08
Attending: HOSPITALIST
Payer: COMMERCIAL

## 2020-07-08 ENCOUNTER — APPOINTMENT (OUTPATIENT)
Dept: GENERAL RADIOLOGY | Facility: CLINIC | Age: 80
DRG: 291 | End: 2020-07-08
Attending: EMERGENCY MEDICINE
Payer: COMMERCIAL

## 2020-07-08 ENCOUNTER — HOSPITAL ENCOUNTER (INPATIENT)
Facility: CLINIC | Age: 80
LOS: 2 days | Discharge: HOME HEALTH | DRG: 291 | End: 2020-07-10
Attending: EMERGENCY MEDICINE | Admitting: HOSPITALIST
Payer: COMMERCIAL

## 2020-07-08 VITALS
HEIGHT: 70 IN | OXYGEN SATURATION: 100 % | DIASTOLIC BLOOD PRESSURE: 75 MMHG | BODY MASS INDEX: 22.79 KG/M2 | SYSTOLIC BLOOD PRESSURE: 146 MMHG | HEART RATE: 89 BPM | WEIGHT: 159.2 LBS

## 2020-07-08 DIAGNOSIS — I50.33 ACUTE ON CHRONIC HEART FAILURE WITH PRESERVED EJECTION FRACTION (HFPEF) (H): ICD-10-CM

## 2020-07-08 DIAGNOSIS — J90 PLEURAL EFFUSION: ICD-10-CM

## 2020-07-08 DIAGNOSIS — I27.20 PULMONARY HYPERTENSION (H): ICD-10-CM

## 2020-07-08 DIAGNOSIS — J90 PLEURAL EFFUSION: Primary | ICD-10-CM

## 2020-07-08 DIAGNOSIS — I50.9 ACUTE ON CHRONIC CONGESTIVE HEART FAILURE, UNSPECIFIED HEART FAILURE TYPE (H): ICD-10-CM

## 2020-07-08 DIAGNOSIS — R06.02 SHORTNESS OF BREATH: ICD-10-CM

## 2020-07-08 DIAGNOSIS — E78.5 HYPERLIPIDEMIA LDL GOAL <100: ICD-10-CM

## 2020-07-08 DIAGNOSIS — I10 ESSENTIAL HYPERTENSION: ICD-10-CM

## 2020-07-08 DIAGNOSIS — I27.20 PULMONARY HYPERTENSION (H): Primary | ICD-10-CM

## 2020-07-08 DIAGNOSIS — R06.09 DYSPNEA ON EXERTION: ICD-10-CM

## 2020-07-08 PROBLEM — R06.00 DYSPNEA: Status: ACTIVE | Noted: 2020-07-08

## 2020-07-08 LAB
ALBUMIN UR-MCNC: NEGATIVE MG/DL
ANION GAP SERPL CALCULATED.3IONS-SCNC: 10.4 MMOL/L (ref 6–17)
APPEARANCE UR: CLEAR
BASOPHILS # BLD AUTO: 0.1 10E9/L (ref 0–0.2)
BASOPHILS NFR BLD AUTO: 0.9 %
BILIRUB UR QL STRIP: NEGATIVE
BUN SERPL-MCNC: 22 MG/DL (ref 7–30)
CALCIUM SERPL-MCNC: 9.4 MG/DL (ref 8.5–10.5)
CHLORIDE SERPL-SCNC: 99 MMOL/L (ref 98–107)
CO2 SERPL-SCNC: 31 MMOL/L (ref 23–29)
COLOR UR AUTO: YELLOW
CREAT SERPL-MCNC: 2.2 MG/DL (ref 0.7–1.3)
DIFFERENTIAL METHOD BLD: ABNORMAL
EOSINOPHIL # BLD AUTO: 0.6 10E9/L (ref 0–0.7)
EOSINOPHIL NFR BLD AUTO: 3.9 %
ERYTHROCYTE [DISTWIDTH] IN BLOOD BY AUTOMATED COUNT: 17.8 % (ref 10–15)
GFR SERPL CREATININE-BSD FRML MDRD: 29 ML/MIN/{1.73_M2}
GLUCOSE BLDC GLUCOMTR-MCNC: 234 MG/DL (ref 70–99)
GLUCOSE BLDC GLUCOMTR-MCNC: 430 MG/DL (ref 70–99)
GLUCOSE SERPL-MCNC: 373 MG/DL (ref 70–105)
GLUCOSE UR STRIP-MCNC: 300 MG/DL
HBA1C MFR BLD: 7.4 % (ref 0–5.6)
HCT VFR BLD AUTO: 34.4 % (ref 40–53)
HGB BLD-MCNC: 10.7 G/DL (ref 13.3–17.7)
HGB UR QL STRIP: NEGATIVE
IMM GRANULOCYTES # BLD: 0.1 10E9/L (ref 0–0.4)
IMM GRANULOCYTES NFR BLD: 0.6 %
INTERPRETATION ECG - MUSE: NORMAL
KETONES UR STRIP-MCNC: NEGATIVE MG/DL
LEUKOCYTE ESTERASE UR QL STRIP: NEGATIVE
LYMPHOCYTES # BLD AUTO: 1.6 10E9/L (ref 0.8–5.3)
LYMPHOCYTES NFR BLD AUTO: 10.5 %
MCH RBC QN AUTO: 27.4 PG (ref 26.5–33)
MCHC RBC AUTO-ENTMCNC: 31.1 G/DL (ref 31.5–36.5)
MCV RBC AUTO: 88 FL (ref 78–100)
MONOCYTES # BLD AUTO: 0.5 10E9/L (ref 0–1.3)
MONOCYTES NFR BLD AUTO: 3 %
NEUTROPHILS # BLD AUTO: 12.2 10E9/L (ref 1.6–8.3)
NEUTROPHILS NFR BLD AUTO: 81.1 %
NITRATE UR QL: NEGATIVE
NRBC # BLD AUTO: 0 10*3/UL
NRBC BLD AUTO-RTO: 0 /100
NT-PROBNP SERPL-MCNC: 2210 PG/ML (ref 0–450)
PH UR STRIP: 7.5 PH (ref 5–7)
PLATELET # BLD AUTO: 506 10E9/L (ref 150–450)
POTASSIUM SERPL-SCNC: 4.4 MMOL/L (ref 3.5–5.1)
PROCALCITONIN SERPL-MCNC: 0.1 NG/ML
RBC # BLD AUTO: 3.91 10E12/L (ref 4.4–5.9)
SARS-COV-2 PCR COMMENT: NORMAL
SARS-COV-2 RNA SPEC QL NAA+PROBE: NEGATIVE
SARS-COV-2 RNA SPEC QL NAA+PROBE: NORMAL
SODIUM SERPL-SCNC: 136 MMOL/L (ref 136–145)
SOURCE: ABNORMAL
SP GR UR STRIP: 1.01 (ref 1–1.03)
SPECIMEN SOURCE: NORMAL
SPECIMEN SOURCE: NORMAL
TROPONIN I SERPL-MCNC: <0.015 UG/L (ref 0–0.04)
UROBILINOGEN UR STRIP-MCNC: NORMAL MG/DL (ref 0–2)
WBC # BLD AUTO: 15.1 10E9/L (ref 4–11)

## 2020-07-08 PROCEDURE — 81003 URINALYSIS AUTO W/O SCOPE: CPT | Performed by: EMERGENCY MEDICINE

## 2020-07-08 PROCEDURE — C9803 HOPD COVID-19 SPEC COLLECT: HCPCS

## 2020-07-08 PROCEDURE — 85025 COMPLETE CBC W/AUTO DIFF WBC: CPT | Performed by: EMERGENCY MEDICINE

## 2020-07-08 PROCEDURE — 83036 HEMOGLOBIN GLYCOSYLATED A1C: CPT | Performed by: EMERGENCY MEDICINE

## 2020-07-08 PROCEDURE — 80048 BASIC METABOLIC PNL TOTAL CA: CPT | Performed by: INTERNAL MEDICINE

## 2020-07-08 PROCEDURE — 83880 ASSAY OF NATRIURETIC PEPTIDE: CPT | Performed by: PHYSICIAN ASSISTANT

## 2020-07-08 PROCEDURE — 36415 COLL VENOUS BLD VENIPUNCTURE: CPT | Performed by: INTERNAL MEDICINE

## 2020-07-08 PROCEDURE — U0003 INFECTIOUS AGENT DETECTION BY NUCLEIC ACID (DNA OR RNA); SEVERE ACUTE RESPIRATORY SYNDROME CORONAVIRUS 2 (SARS-COV-2) (CORONAVIRUS DISEASE [COVID-19]), AMPLIFIED PROBE TECHNIQUE, MAKING USE OF HIGH THROUGHPUT TECHNOLOGIES AS DESCRIBED BY CMS-2020-01-R: HCPCS | Performed by: EMERGENCY MEDICINE

## 2020-07-08 PROCEDURE — 21000001 ZZH R&B HEART CARE

## 2020-07-08 PROCEDURE — 99223 1ST HOSP IP/OBS HIGH 75: CPT | Mod: AI | Performed by: HOSPITALIST

## 2020-07-08 PROCEDURE — 99285 EMERGENCY DEPT VISIT HI MDM: CPT | Mod: 25

## 2020-07-08 PROCEDURE — 40000986 XR CHEST PORT 1 VW

## 2020-07-08 PROCEDURE — 71045 X-RAY EXAM CHEST 1 VIEW: CPT

## 2020-07-08 PROCEDURE — 25000132 ZZH RX MED GY IP 250 OP 250 PS 637: Performed by: HOSPITALIST

## 2020-07-08 PROCEDURE — 25000131 ZZH RX MED GY IP 250 OP 636 PS 637: Performed by: HOSPITALIST

## 2020-07-08 PROCEDURE — 25000128 H RX IP 250 OP 636: Performed by: HOSPITALIST

## 2020-07-08 PROCEDURE — 99214 OFFICE O/P EST MOD 30 MIN: CPT | Performed by: PHYSICIAN ASSISTANT

## 2020-07-08 PROCEDURE — 84145 PROCALCITONIN (PCT): CPT | Performed by: EMERGENCY MEDICINE

## 2020-07-08 PROCEDURE — 00000146 ZZHCL STATISTIC GLUCOSE BY METER IP

## 2020-07-08 PROCEDURE — 0W9B30Z DRAINAGE OF LEFT PLEURAL CAVITY WITH DRAINAGE DEVICE, PERCUTANEOUS APPROACH: ICD-10-PCS | Performed by: PHYSICIAN ASSISTANT

## 2020-07-08 PROCEDURE — 84484 ASSAY OF TROPONIN QUANT: CPT | Performed by: EMERGENCY MEDICINE

## 2020-07-08 PROCEDURE — 93005 ELECTROCARDIOGRAM TRACING: CPT

## 2020-07-08 RX ORDER — AMOXICILLIN 250 MG
2 CAPSULE ORAL 2 TIMES DAILY PRN
Status: DISCONTINUED | OUTPATIENT
Start: 2020-07-08 | End: 2020-07-10 | Stop reason: HOSPADM

## 2020-07-08 RX ORDER — ONDANSETRON 2 MG/ML
4 INJECTION INTRAMUSCULAR; INTRAVENOUS EVERY 6 HOURS PRN
Status: DISCONTINUED | OUTPATIENT
Start: 2020-07-08 | End: 2020-07-10 | Stop reason: HOSPADM

## 2020-07-08 RX ORDER — NICOTINE POLACRILEX 4 MG
15-30 LOZENGE BUCCAL
Status: DISCONTINUED | OUTPATIENT
Start: 2020-07-08 | End: 2020-07-10 | Stop reason: HOSPADM

## 2020-07-08 RX ORDER — TIOTROPIUM BROMIDE 18 UG/1
18 CAPSULE ORAL; RESPIRATORY (INHALATION) DAILY
Status: DISCONTINUED | OUTPATIENT
Start: 2020-07-08 | End: 2020-07-08 | Stop reason: CLARIF

## 2020-07-08 RX ORDER — PRAVASTATIN SODIUM 20 MG
20 TABLET ORAL AT BEDTIME
Status: DISCONTINUED | OUTPATIENT
Start: 2020-07-08 | End: 2020-07-10 | Stop reason: HOSPADM

## 2020-07-08 RX ORDER — FUROSEMIDE 10 MG/ML
40 INJECTION INTRAMUSCULAR; INTRAVENOUS ONCE
Status: COMPLETED | OUTPATIENT
Start: 2020-07-08 | End: 2020-07-08

## 2020-07-08 RX ORDER — AMOXICILLIN 250 MG
1 CAPSULE ORAL 2 TIMES DAILY PRN
Status: DISCONTINUED | OUTPATIENT
Start: 2020-07-08 | End: 2020-07-10 | Stop reason: HOSPADM

## 2020-07-08 RX ORDER — LOSARTAN POTASSIUM 25 MG/1
25 TABLET ORAL DAILY
COMMUNITY
End: 2020-09-22

## 2020-07-08 RX ORDER — FUROSEMIDE 10 MG/ML
40 INJECTION INTRAMUSCULAR; INTRAVENOUS
Status: DISCONTINUED | OUTPATIENT
Start: 2020-07-09 | End: 2020-07-10

## 2020-07-08 RX ORDER — ALBUTEROL SULFATE 90 UG/1
2 AEROSOL, METERED RESPIRATORY (INHALATION) EVERY 4 HOURS PRN
COMMUNITY
End: 2021-03-17

## 2020-07-08 RX ORDER — NALOXONE HYDROCHLORIDE 0.4 MG/ML
.1-.4 INJECTION, SOLUTION INTRAMUSCULAR; INTRAVENOUS; SUBCUTANEOUS
Status: DISCONTINUED | OUTPATIENT
Start: 2020-07-08 | End: 2020-07-10 | Stop reason: HOSPADM

## 2020-07-08 RX ORDER — VERAPAMIL HYDROCHLORIDE 240 MG/1
240 TABLET, FILM COATED, EXTENDED RELEASE ORAL AT BEDTIME
Status: DISCONTINUED | OUTPATIENT
Start: 2020-07-08 | End: 2020-07-10 | Stop reason: HOSPADM

## 2020-07-08 RX ORDER — ONDANSETRON 4 MG/1
4 TABLET, ORALLY DISINTEGRATING ORAL EVERY 6 HOURS PRN
Status: DISCONTINUED | OUTPATIENT
Start: 2020-07-08 | End: 2020-07-10 | Stop reason: HOSPADM

## 2020-07-08 RX ORDER — ALBUTEROL SULFATE 90 UG/1
2 AEROSOL, METERED RESPIRATORY (INHALATION) EVERY 4 HOURS PRN
Status: DISCONTINUED | OUTPATIENT
Start: 2020-07-08 | End: 2020-07-10 | Stop reason: HOSPADM

## 2020-07-08 RX ORDER — LIDOCAINE 40 MG/G
CREAM TOPICAL
Status: DISCONTINUED | OUTPATIENT
Start: 2020-07-08 | End: 2020-07-10 | Stop reason: HOSPADM

## 2020-07-08 RX ORDER — ACETAMINOPHEN 325 MG/1
650 TABLET ORAL EVERY 4 HOURS PRN
Status: DISCONTINUED | OUTPATIENT
Start: 2020-07-08 | End: 2020-07-10 | Stop reason: HOSPADM

## 2020-07-08 RX ORDER — FUROSEMIDE 40 MG
40 TABLET ORAL
Status: DISCONTINUED | OUTPATIENT
Start: 2020-07-09 | End: 2020-07-08

## 2020-07-08 RX ORDER — DEXTROSE MONOHYDRATE 25 G/50ML
25-50 INJECTION, SOLUTION INTRAVENOUS
Status: DISCONTINUED | OUTPATIENT
Start: 2020-07-08 | End: 2020-07-10 | Stop reason: HOSPADM

## 2020-07-08 RX ADMIN — PRAVASTATIN SODIUM 20 MG: 20 TABLET ORAL at 21:14

## 2020-07-08 RX ADMIN — INSULIN ASPART 2 UNITS: 100 INJECTION, SOLUTION INTRAVENOUS; SUBCUTANEOUS at 17:37

## 2020-07-08 RX ADMIN — FUROSEMIDE 40 MG: 10 INJECTION, SOLUTION INTRAMUSCULAR; INTRAVENOUS at 17:11

## 2020-07-08 RX ADMIN — VERAPAMIL HYDROCHLORIDE 240 MG: 240 TABLET, FILM COATED, EXTENDED RELEASE ORAL at 21:14

## 2020-07-08 RX ADMIN — APIXABAN 2.5 MG: 2.5 TABLET, FILM COATED ORAL at 20:25

## 2020-07-08 ASSESSMENT — ACTIVITIES OF DAILY LIVING (ADL): ADLS_ACUITY_SCORE: 13

## 2020-07-08 ASSESSMENT — MIFFLIN-ST. JEOR: SCORE: 1433.38

## 2020-07-08 NOTE — H&P
[facility]     History and Physical - Hospitalist Service         Name: Tc Garcia    MRN: [id number]  YOB: 1940    Age: 80 year old  Date of Admission:  7/8/2020  Physician: Sejal Sylvester MD  Text Page        Assessment & Plan   Tc Garcia is a 80 year old male with PMH poorly controlled DM2, HTN, chronic anemia, recurrent L pleurel effusion s/p pleurex catheter, paroxysmal afib/flutter, HFpEF with pulm HTN who was sent to the ED from [facility] clinic on 07/08/20 with increased dyspnea.    COVID 19 PCR pending - asymptomatic    Shortness of breath  Acute on chronic HFpEF  Pulmonary Hypertension: taken off amiodarone and sildenifil in the last few months. BNP elevated. Problems with Pleurex catheter recently.  - admit to [facility]; monitor on cardiac telemetry  - weight up from dry weight. Sent from [facility] clinic for CHF exac.  - lasix 40 mg IV BID  - daily weights; intake/output  - cardiology consult  - hold ARB for now due to plan for aggressive diuresis and elevated creat    Recurrent Left pleural effusion s/p Pleurex placement  Left chest tube dysfunction  Small left pneumothorax  - IR evaluated chest tube in ED and connected to suction with 550 mL of fluid out  - IR will continue to follow chest tube during admission  - Repeat CXR done after drainage with improvement in effusion - small ptx still noted.    Paroxysmal atrial fibrillation/flutter on anticoagulation  - monitor on telemetry  - continue Eliquis  - continue at bedtime Verapamil for rate control    Poorly controlled DM2: previously controlled on insulin pump, but transitioned to lantus at last admission. He was supposed to have an appt today to resume insulin pump. Currently on 14 units lantus qam with carb based mealtime dosing. Hgb A1c 7.4 today.  - continue 14 units lantus qam  - medium dose sliding scale with meals  - consistent carb diet    ABBIE on CKD3  Anemia in CKD: creat elevated to 2.2 from baseline ~1.5  -  "hold losartan given elevated creat  - recheck tomorrow  - consider involving renal consult if necessary  - Hgb relatively stable    Chronic COPD not in exacerbation  - continue PTA inhalers      Diet: Orders Placed This Encounter      Combination Diet [id number] Calories: Moderate Consistent CHO (4-6 CHO units/meal); 2 gm NA Diet     DVT Prophylaxis: on apixaban  Code Status: Full Code  Pruett Catheter: not present      Disposition Plan    Expected discharge: 2 - 3 days, recommended to prior living arrangement once cardiac/respiratory status stable.    Sejal Sylvester MD  07/08/2020 1:44 PM       Primary Care Physician   *Charlie José Manuel Stef, [phone]    Chief Complaint   Shortness of breath    History is obtained from the patient and EMR.  I also spoke with the ER provider about the history and reviewed the note from [facility] clinic earlier today.    History of Present Illness   Tc Garcia is a 80 year old male who presents with shortness of breath that improved significantly after drainage of pleural effusion/adjustment of pleurex catheter in ED. He did miss an appt where he was meant to get back on his insulin pump today and is disappointed about that. Denies any recent illness, including fever or chills. No other acute complaints.    From [facility] clinic note earlier today:  I last saw Tc in 6/16 for [facility] follow up. Weight was back up to 156 lbs, but he reported feeling much better after an iron infusion and noted more energy. Again, he said the swelling was \"about the same.\" His insulin regimen continued to be adjusted by his endocrine team. He admitted to not wear his compression socks daily and was also noted to maintain a fairly high salt diet which I educated him about. His Scr had gone up further to 1.89 but as he was feeling better, I did not make any changes that visit.      Today we are having another [facility] follow up in efforts to avoid rehospitalization.  He tells me that over the last few days his " breathing has worsened. He has conversational dyspnea during our visit. Weight is up to 159 today. He says he called his pulmonary team who requested a chest xray to see if his pleurex was working and asks if we can do this today. He denies any chest pain or palpitations. Notably, he is no longer receiving home health as of about a week ago.     Past Medical History    Past Medical History:   Diagnosis Date     Anemia      Anemia of chronic disease 2020     CKD (chronic kidney disease) stage 3, GFR 30-59 ml/min (H)      CRF (chronic renal failure), stage 3  2020     Fall 2019    suffered multiple left rib fractures, C3 and T2 fractures     Mixed hyperlipidemia 2004     Paroxysmal atrial fibrillation (H)      Personal history of colonic polyps     gets colonoscopy every five years, due in      Pleural effusion on left 2019    after multiple rib fractures     Pulmonary hypertension (H) 5/10/2020    Added automatically from request for surgery [id number]     Recurrent Left Pleural effusion -- S/P Pleurex Cath 2019     Rosacea      Type II or unspecified type diabetes mellitus without mention of complication, not stated as uncontrolled      Unspecified essential hypertension          Past Surgical History   Past Surgical History:   Procedure Laterality Date     ANESTHESIA CARDIOVERSION N/A 2/3/2020    Procedure: ANESTHESIA, FOR CARDIOVERSION;  Surgeon: GENERIC ANESTHESIA PROVIDER;  Location:  OR      RIGHT HEART CATH N/A 2020    Procedure: Right Heart Cath;  Surgeon: Senthil Silva MD;  Location:  HEART CARDIAC CATH LAB     HC REMOVE TONSILS/ADENOIDS,<11 Y/O      T & A <11y.o.     IR CHEST TUBE DRAIN TUNNELED LEFT  2020        Prior to Admission Medications   Prior to Admission Medications   Prescriptions Last Dose Informant Patient Reported? Taking?   LOVASTATIN 20 MG PO TABS  Self No No   Si TABLET DAILY AT DINNER   acetaminophen (TYLENOL)  325 MG tablet  Self No No   Sig: Take 2 tablets (650 mg) by mouth every 4 hours as needed for mild pain   apixaban ANTICOAGULANT (ELIQUIS) 5 MG tablet  Self No No   Sig: Take 1/2 tablet (2.5mg) by mouth twice daily. You will have your labs checked on 20 and your PCP will direct you when it is safe to increase your dose back to 1 tablet (5mg) twice daily.   furosemide (LASIX) 40 MG tablet   No No   Sig: Take 1 tablet (40 mg) by mouth 2 times daily   glucagon 1 MG kit  Self Yes No   Si mg as needed for low blood sugar   insulin aspart (NOVOLOG PEN) 100 UNIT/ML pen   No No   Sig: Inject 8 units subcutaneous with breakfast, 8 units subcutaneous with lunch and 4 units with supper.   insulin glargine (LANTUS PEN) 100 UNIT/ML pen   No No   Sig: Inject 10 Units Subcutaneous every morning   nystatin (MYCOSTATIN) 690869 UNIT/GM external cream  Self Yes No   Sig: Apply topically 2 times daily as needed    potassium chloride ER (KLOR-CON M) 10 MEQ CR tablet   No No   Sig: Take 1 tablet (10 mEq) by mouth 2 times daily   tiotropium (SPIRIVA) 18 MCG inhaled capsule  Self Yes No   Sig: Inhale 18 mcg into the lungs daily   verapamil ER (VERELAN) 240 MG 24 hr capsule  Self No No   Sig: Take 1 capsule (240 mg) by mouth At Bedtime      Facility-Administered Medications: None     Allergies   Allergies   Allergen Reactions     No Known Drug Allergies        Social History   Social History     Tobacco Use     Smoking status: Former Smoker     Packs/day: 0.20     Years: 40.00     Pack years: 8.00     Types: Cigarettes     Start date:      Last attempt to quit: 2008     Years since quittin.5     Smokeless tobacco: Never Used     Tobacco comment: occasional   Substance Use Topics     Alcohol use: Not Currently     Alcohol/week: 35.0 standard drinks     Social History     Social History Narrative     Not on file       Family History   Family history reviewed with patient and is noncontributory.  Family History   Problem  Relation Age of Onset     Family History Negative Mother          age 71     Family History Negative Father          age 70     Diabetes Brother         alive age 77     Diabetes Brother         alive age 76     Family History Negative Brother              Family History Negative Brother              Diabetes Sister         alive age76     Family History Negative Sister          age 86     Heart Disease Sister              Family History Negative Sister              Family History Negative Sister              Diabetes Sister      Diabetes Sister      Diabetes Brother      Diabetes Brother          Review of Systems   A Comprehensive greater than 10 system review of systems was carried out.  Pertinent positives and negatives are noted above.  Otherwise negative for contributory information.    Physical Exam   Temp: 98.1  F (36.7  C) Temp src: Oral BP: (!) 174/100 Pulse: 108 Heart Rate: 82 Resp: 18 SpO2: 100 % O2 Device: None (Room air)    Vital Signs with Ranges  Temp:  [98.1  F (36.7  C)] 98.1  F (36.7  C)  Pulse:  [] 108  Heart Rate:  [82] 82  Resp:  [18] 18  BP: (145-174)/() 174/100  SpO2:  [100 %] 100 %  0 lbs 0 oz    GEN:  Alert, oriented x 3, appears comfortable, no overt distress  HEENT:  Normocephalic/atraumatic, no scleral icterus, no nasal discharge, mouth moist.  CV:  Irregularly irregularly  LUNGS:  Clear on right side, diminished on left. Symmetric chest rise on inhalation noted.  ABD:  Active bowel sounds, soft, non-tender/non-distended.  No rebound/guarding/rigidity.  EXT:  2+ pitting edema in LE. 1+ pitting edema in UE to wrists.  SKIN:  Dry to touch, some areas of small scabbing over bilateral LE.  NEURO:  Non focal. Moving all extremities.    Data   Data reviewed today:  I personally reviewed the EKG tracing showing afib at 80bpm. No ST-T wave changes..    Lab Results   Component Value Date    WBC 15.1 (H) 2020    HGB  10.7 (L) 07/08/2020    HCT 34.4 (L) 07/08/2020     (H) 07/08/2020     07/08/2020    POTASSIUM 4.4 07/08/2020    CHLORIDE 99 07/08/2020    CO2 31 (H) 07/08/2020    BUN 22 07/08/2020    CR 2.20 (H) 07/08/2020     (H) 07/08/2020    NTBNPI 5,560 (H) 05/21/2020    NTBNP 2,210 (H) 07/08/2020    TROPI <0.015 07/08/2020    AST 27 05/21/2020    ALT 24 05/21/2020    GGT 34 11/07/2019    ALKPHOS 107 05/21/2020    BILITOTAL 0.6 05/21/2020    INR 1.49 (H) 05/10/2020        Recent Results (from the past 24 hour(s))   XR Chest Port 1 View    Narrative    XR CHEST PORT 1 VW  7/8/2020 12:41 PM       INDICATION: shortness of breath  COMPARISON: 5/22/2020       Impression    IMPRESSION: A small left pneumothorax has developed, with a Pleurx  catheter in the left pleural space. Small to moderate size left  pleural effusion is unchanged. Underlying scarring or atelectasis in  the left mid and lower lung, also similar to previous. The right lung  remains clear.    Multiple old rib fractures.    CEDRICK GERMAIN MD

## 2020-07-08 NOTE — PHARMACY-ADMISSION MEDICATION HISTORY
Pharmacy Medication History  Admission medication history interview status for the 7/8/2020  admission is complete. See EPIC admission navigator for prior to admission medications     Medication history sources: Patient and Surescripts  Medication history source reliability: Good  Adherence assessment: Good    Significant changes made to the medication list:  Addition of losartan  Addition of albuterol inhaler  Increase in # of units for Lantus pen      Additional medication history information:       Medication reconciliation completed by provider prior to medication history? No    Time spent in this activity: 15 min      Prior to Admission medications    Medication Sig Last Dose Taking? Auth Provider   acetaminophen (TYLENOL) 325 MG tablet Take 2 tablets (650 mg) by mouth every 4 hours as needed for mild pain Past Week at Unknown time Yes Shaun Stack MD   albuterol (PROAIR HFA/PROVENTIL HFA/VENTOLIN HFA) 108 (90 Base) MCG/ACT inhaler Inhale 2 puffs into the lungs every 4 hours as needed for shortness of breath / dyspnea or wheezing Inhale 2 puffs every 4 to 6 hours as needed. PRN Yes Unknown, Entered By History   apixaban ANTICOAGULANT (ELIQUIS) 5 MG tablet Take 1/2 tablet (2.5mg) by mouth twice daily. You will have your labs checked on 5/4/20 and your PCP will direct you when it is safe to increase your dose back to 1 tablet (5mg) twice daily. 7/8/2020 at AM Yes Kelley Fernandez DO   furosemide (LASIX) 40 MG tablet Take 1 tablet (40 mg) by mouth 2 times daily 7/8/2020 at AM Yes Katlin Fontanez PA   glucagon 1 MG kit 1 mg as needed for low blood sugar  Yes Unknown, Entered By History   insulin aspart (NOVOLOG PEN) 100 UNIT/ML pen Inject 8 units subcutaneous with breakfast, 8 units subcutaneous with lunch and 4 units with supper. 7/8/2020 at AM Yes Anny Beach MD   insulin glargine (LANTUS PEN) 100 UNIT/ML pen Inject 14 Units Subcutaneous every morning 7/8/2020 at AM Yes Unknown, Entered  By History   losartan (COZAAR) 25 MG tablet Take 25 mg by mouth daily 7/8/2020 at AM Yes Unknown, Entered By History   LOVASTATIN 20 MG PO TABS 1 TABLET DAILY AT DINNER 7/7/2020 at PM Yes Tc Palacios MD   nystatin (MYCOSTATIN) 264524 UNIT/GM external cream Apply topically 2 times daily as needed  PRN Yes Unknown, Entered By History   potassium chloride ER (KLOR-CON M) 10 MEQ CR tablet Take 1 tablet (10 mEq) by mouth 2 times daily 7/8/2020 at AM Yes Anny Beach MD   tiotropium (SPIRIVA) 18 MCG inhaled capsule Inhale 18 mcg into the lungs daily 7/8/2020 at AM Yes Unknown, Entered By History   verapamil ER (VERELAN) 240 MG 24 hr capsule Take 1 capsule (240 mg) by mouth At Bedtime 7/7/2020 at PM Yes Quinton Camejo MD     2

## 2020-07-08 NOTE — LETTER
7/8/2020    Charlie Finch MD  [address]  [facility] [address]    RE: Tc Garcia       Dear Colleague,    I had the pleasure of seeing Tc Garcia in the [facility].      Cardiology Progress Note    Date of Service: 07/08/2020      Reason for visit: Follow up pulmonary hypertension, HFpEF.    Cardiology team: Dr. Julien (general), Dr. Camejo (EP), Dr. Bonilla (PH), Annette Mejía PA-C ([facility])      HPI:  Mr. Garcia is an 80 year old male with a PMhx including poorly controlled diabetes, HTN, chronic anemia, recurrent left pleural effusion, paroxysmal atrial fibrillation/flutter, and HFpEF with pulmonary hypertension. He was hospitalized in early May 2020 with progressive dyspnea and due to recurrent (transudative) pleural effusion a left PleurX catheter was placed. He was diuresed with IV furosemide and given empiric abx therapy. Echo showed normal LVEF 55-60% with normal wall motion. The RV was moderately dilated with decreased systolic function and mild TR. He underwent a RHC during that stay that showed mild PH (PA pressure of 28 mmHg (51/15), mean RA of 4mmHg, and mildly elevated filling pressures with a wedge of 12mmHg (Vwave 16). PVR was 3.9 Wood units and Carlos Manuel CO/CI was 4.09/2.23. With vasodilator challenge, his PVR normalized to 1.22 wood units. Therefore, he was deemed to have pulmonary hypertension out of proportion to his left heart disease. He was started on Cialis 5mg daily but because this was not a formulated preparation, was switched to sildenafil 20 mg 3 times daily and discharged home. Interestingly, he was not discharged on any diuretics. Discharge wt was 152 lbs. His admission was complicated by atrial flutter for which he was given amiodarone but because of prolongation in QTc, this was discontinued, and he was continued on his PTA verapamil.       Following discharge he developed symptomatic hypotension and via phone was told to stop the sildenafil.  "He then saw Dr. Chad gusman for PH evaluation a few days later. He reported hypertension with SBP >200mmHg and weight was up 12 lbs from discharge. Torsemide 10 mg daily, Losartan 25 mg daily, and KCL 40 mEq were all started. She recommended resuming sildenafil once he was felt to be euvolemic.      Unfortunately, he was hospitalized again from 5/21 to 5/29. There was concern regarding his overall cognition vs confusion due to fluctuating blood sugars. It was noted that the medications he was truly taking were much different than what we had been attempting to adjust. His insulin pump was removed and regimen adjusted. A repeat chest CT showed no pneumonia and improvement in his effusions since placement of his pleurex. He again required IV lasix and was discharged on Lasix 40mg BID in place of the torsemide. Due to renal concerns, his hydrochlorothiazide and losartan were held. Fortunately, home health was arranged to help him manage his medications at home.     I saw Mr. Garcia in the [facility] clinic in person on 6/5/2020. His weight on our scale was down to 148 lbs though he felt that his lower extremity edema was \"about the same.\"  The home health nurse has been coming frequently to help with his medications. Unfortunately, blood sugars continued to remain poorly controlled. Labs at that visit showed a slight increase in his SCr to 1.66 and I asked him to reduce his lasix to 40mg daily. In addition, we discussed a plan to resume sildenafil. Unfortunately, his home care nurse called a few days later to report a weight gain of 5 lbs in 3-4 days. Thus, we asked him to go back to 40mg BID for his lasix. He followed up with nephrology shortly after, and iron infusions were recommended, but no changes were made to his diuretic regimen.    I last saw Tc in 6/16 for [facility] follow up. Weight was back up to 156 lbs, but he reported feeling much better after an iron infusion and noted more energy. Again, he said the swelling " "was \"about the same.\" His insulin regimen continued to be adjusted by his endocrine team. He admitted to not wear his compression socks daily and was also noted to maintain a fairly high salt diet which I educated him about. His Scr had gone up further to 1.89 but as he was feeling better, I did not make any changes that visit.     Today we are having another [facility] follow up in efforts to avoid rehospitalization.  He tells me that over the last few days his breathing has worsened. He has conversational dyspnea during our visit. Weight is up to 159 today. He says he called his pulmonary team who requested a chest xray to see if his pleurex was working and asks if we can do this today. He denies any chest pain or palpitations. Notably, he is no longer receiving home health as of about a week ago.        Assessment/Plan:    1. Acute on chronic HFpEF.   --Overall, his situation has been complex. His most recent echo from May 2020 shows preserved EF 55-60%, with normal wall motion. RV was mild to moderately dilated with mildly decreased RV function. RHC during his hospital stay in May as below.    --Today his weight is up to 159 lbs, and he is visibly winded during our visit even at rest. Labs show SCr up to 2.2 from 1.89 last visit. He wonders if his pleurex is working properly and tells me that his pulmonologist has requested a chest xray. Labs show further bump in his SCr (NT-proBNP still pending). Given his clinical worsening, we will take him to the ED for further evaluation.     2. Pulmonary hypertension.   --RHC in May 2020 showed PA pressure of 28 mmHg (51/15), mean RA of 4mmHg, and mildly elevated filling pressures with a wedge of 12mmHg (Vwave 16). PVR was 3.9 Wood units and Carlos Manuel CO/CI was 4.09/2.23. With vasodilator challenge, his PVR normalized to 1.22 wood units. Therefore, he was deemed to have pulmonary hypertension out of proportion to his left heart disease.   --As per Dr. Bonilla's recommendations, once he " is euvolemic, our plan is to slowly reintroduce sildenafil. However, this is currently on hold as his weight has continued to fluctuate.   --In the future, when felt safe from a COVID standpoint, he could benefit from pulmonary rehab.     3. Paroxysmal atrial fibrillation/flutter.   --Heart rates in 80s at our visit. He is not currently on beta blockers, and remains on verapamil which I believe has been longstanding for him. Had a prolonged QTC on amiodarone in the past.    --He is on anticoagulation with apixaban 2.5mg BID due to age and renal function.     4. Hypertension.   --BP up again today but he reports somewhat lower at home. I believe there are plans ultimately to reintroduce losartan in the future per nephrology.       Follow up plan: As above; plan for ED evaluation today. Will need follow up arranged after.       Orders this Visit:  No orders of the defined types were placed in this encounter.    No orders of the defined types were placed in this encounter.    Medications Discontinued During This Encounter   Medication Reason     polyethylene glycol (MIRALAX) 17 g packet Stopped by Patient         CURRENT MEDICATIONS:  Current Outpatient Medications   Medication Sig Dispense Refill     acetaminophen (TYLENOL) 325 MG tablet Take 2 tablets (650 mg) by mouth every 4 hours as needed for mild pain  0     apixaban ANTICOAGULANT (ELIQUIS) 5 MG tablet Take 1/2 tablet (2.5mg) by mouth twice daily. You will have your labs checked on 5/4/20 and your PCP will direct you when it is safe to increase your dose back to 1 tablet (5mg) twice daily.       furosemide (LASIX) 40 MG tablet Take 1 tablet (40 mg) by mouth 2 times daily 180 tablet 0     glucagon 1 MG kit 1 mg as needed for low blood sugar       insulin aspart (NOVOLOG PEN) 100 UNIT/ML pen Inject 8 units subcutaneous with breakfast, 8 units subcutaneous with lunch and 4 units with supper. 3 mL 0     insulin glargine (LANTUS PEN) 100 UNIT/ML pen Inject 10 Units  Subcutaneous every morning 3 mL 0     LOVASTATIN 20 MG PO TABS 1 TABLET DAILY AT DINNER 90 Tab 0     nystatin (MYCOSTATIN) 215859 UNIT/GM external cream Apply topically 2 times daily as needed        potassium chloride ER (KLOR-CON M) 10 MEQ CR tablet Take 1 tablet (10 mEq) by mouth 2 times daily 60 tablet 0     tiotropium (SPIRIVA) 18 MCG inhaled capsule Inhale 18 mcg into the lungs daily       verapamil ER (VERELAN) 240 MG 24 hr capsule Take 1 capsule (240 mg) by mouth At Bedtime         ALLERGIES     Allergies   Allergen Reactions     No Known Drug Allergies        PAST MEDICAL HISTORY:  Past Medical History:   Diagnosis Date     Anemia      Anemia of chronic disease 5/14/2020     CKD (chronic kidney disease) stage 3, GFR 30-59 ml/min (H)      CRF (chronic renal failure), stage 3  5/14/2020     Fall 11/2019    suffered multiple left rib fractures, C3 and T2 fractures     Mixed hyperlipidemia 7/7/2004     Paroxysmal atrial fibrillation (H)      Personal history of colonic polyps 1972    gets colonoscopy every five years, due in 2006     Pleural effusion on left 11/2019    after multiple rib fractures     Pulmonary hypertension (H) 5/10/2020    Added automatically from request for surgery [id number]     Recurrent Left Pleural effusion -- S/P Pleurex Cath 5/12/20 12/30/2019     Rosacea 1989     Type II or unspecified type diabetes mellitus without mention of complication, not stated as uncontrolled 1999     Unspecified essential hypertension 1994       PAST SURGICAL HISTORY:  Past Surgical History:   Procedure Laterality Date     ANESTHESIA CARDIOVERSION N/A 2/3/2020    Procedure: ANESTHESIA, FOR CARDIOVERSION;  Surgeon: GENERIC ANESTHESIA PROVIDER;  Location:  OR     CV RIGHT HEART CATH N/A 5/13/2020    Procedure: Right Heart Cath;  Surgeon: Senthil Silva MD;  Location:  HEART CARDIAC CATH LAB     HC REMOVE TONSILS/ADENOIDS,<11 Y/O      T & A <11y.o.     IR CHEST TUBE DRAIN TUNNELED LEFT  5/12/2020        FAMILY HISTORY:  Family History   Problem Relation Age of Onset     Family History Negative Mother          age 71     Family History Negative Father          age 70     Diabetes Brother         alive age 77     Diabetes Brother         alive age 76     Family History Negative Brother              Family History Negative Brother              Diabetes Sister         alive age76     Family History Negative Sister          age 86     Heart Disease Sister              Family History Negative Sister              Family History Negative Sister              Diabetes Sister      Diabetes Sister      Diabetes Brother      Diabetes Brother        SOCIAL HISTORY:  Social History     Socioeconomic History     Marital status:      Spouse name: Lianna     Number of children: 4     Years of education: 16     Highest education level: None   Occupational History     Occupation: [employer]     Employer: [employer]    Social Needs     Financial resource strain: None     Food insecurity     Worry: None     Inability: None     Transportation needs     Medical: None     Non-medical: None   Tobacco Use     Smoking status: Former Smoker     Packs/day: 0.20     Years: 40.00     Pack years: 8.00     Types: Cigarettes     Start date:      Last attempt to quit: 2008     Years since quittin.5     Smokeless tobacco: Never Used     Tobacco comment: occasional   Substance and Sexual Activity     Alcohol use: Not Currently     Alcohol/week: 35.0 standard drinks     Drug use: Not Currently     Sexual activity: None   Lifestyle     Physical activity     Days per week: None     Minutes per session: None     Stress: None   Relationships     Social connections     Talks on phone: None     Gets together: None     Attends [employer] service: None     Active member of club or organization: None     Attends meetings of clubs or organizations: None      "Relationship status: None     Intimate partner violence     Fear of current or ex partner: None     Emotionally abused: None     Physically abused: None     Forced sexual activity: None   Other Topics Concern     Parent/sibling w/ CABG, MI or angioplasty before 65F 55M? Not Asked   Social History Narrative     None       Review of Systems:  Cardiovascular: negative for chest pain, palpitations, orthopnea, pos LE edema  Constitutional: negative for chills, sweats, fevers. Pos fatigue. Pos itching all over.  Resp: Negative for dyspnea at rest, pos dyspnea on exertion, neg cough, wheezing, pos known lung disease/recurrent pleural effusion.   HEENT: Negative for new visual changes, frequent headaches.   Gastrointestinal: negative for abdominal pain, diarrhea, vomiting.  Hematologic/lymphatic: pos for current systemic anticoagulation  Neurological: negative for focal weakness, LOC, seizures, syncope. Pos mild dizziness.       Physical Exam:  Vitals: BP (!) 146/75   Pulse 89   Ht 1.778 m (5' 10\")   Wt 72.2 kg (159 lb 3.2 oz)   SpO2 100%   BMI 22.84 kg/m     Wt Readings from Last 4 Encounters:   07/08/20 72.2 kg (159 lb 3.2 oz)   06/16/20 70.8 kg (156 lb)   06/05/20 67.5 kg (148 lb 12.8 oz)   05/29/20 69.4 kg (153 lb)       GEN:  In general, this is a well nourished elderly male. He has conversational dyspnea at rest during our exam. He is ambulatory, unaccompanied.   RESP: Brief auscultation reveals no wheezing or rhonchi. Does seem somewhat diminished left base.   EXTREM: Still 1+ ankle bilateral edema, with chronic venous stasis changes. No cyanosis or clubbing.  NEURO: Alert and oriented, cooperative. Gait not assessed. No obvious focal deficits.       Recent Lab Results:    BMP RESULTS:  Lab Results   Component Value Date     07/08/2020    POTASSIUM 4.4 07/08/2020    CHLORIDE 99 07/08/2020    CO2 31 (H) 07/08/2020    ANIONGAP 10.4 07/08/2020     (H) 07/08/2020    BUN 22 07/08/2020    CR 2.20 (H) " 07/08/2020    GFRESTIMATED 29 (L) 07/08/2020    GFRESTBLACK 35 (L) 07/08/2020    LLOYD 9.4 07/08/2020        Additional pertinent testing:  Echo 5/11/2020     Interpretation Summary     1. Left ventricular systolic function is normal. The visual ejection fraction  is estimated at 55-60%.  2. No regional wall motion abnormalities noted.  3. The right ventricle is mild to moderately dilated. Mildly decreased right  ventricular systolic function  4. There is mild (1+) tricuspid regurgitation.  5. Moderate pulmonary hypertension; The right ventricular systolic pressure is  approximated at 47mmHg plus the right atrial pressure. IVC diameter and  respiratory changes fall into an intermediate range suggesting an RA pressure  of 8 mmHg.  6. Left pleural effusion is present.  7. In comparison with the prior report, estimated pulmonary pressures have  increased somewhat, otherwise, no significant change      Katlin Fontanez PA-C  [facility]  Pager [phone]      Thank you for allowing me to participate in the care of your patient.    Sincerely,     FORTUNATO Reynoso     [facility]

## 2020-07-08 NOTE — PROGRESS NOTES
Cardiology Progress Note    Date of Service: 07/08/2020      Reason for visit: Follow up pulmonary hypertension, HFpEF.    Cardiology team: Dr. Julien (general), Dr. Camejo (EP), Dr. Bonilla (PH), Annette Mejía PA-C ([facility])      HPI:  Mr. Garcia is an 80 year old male with a PMhx including poorly controlled diabetes, HTN, chronic anemia, recurrent left pleural effusion, paroxysmal atrial fibrillation/flutter, and HFpEF with pulmonary hypertension. He was hospitalized in early May 2020 with progressive dyspnea and due to recurrent (transudative) pleural effusion a left PleurX catheter was placed. He was diuresed with IV furosemide and given empiric abx therapy. Echo showed normal LVEF 55-60% with normal wall motion. The RV was moderately dilated with decreased systolic function and mild TR. He underwent a RHC during that stay that showed mild PH (PA pressure of 28 mmHg (51/15), mean RA of 4mmHg, and mildly elevated filling pressures with a wedge of 12mmHg (Vwave 16). PVR was 3.9 Wood units and Carlos Manuel CO/CI was 4.09/2.23. With vasodilator challenge, his PVR normalized to 1.22 wood units. Therefore, he was deemed to have pulmonary hypertension out of proportion to his left heart disease. He was started on Cialis 5mg daily but because this was not a formulated preparation, was switched to sildenafil 20 mg 3 times daily and discharged home. Interestingly, he was not discharged on any diuretics. Discharge wt was 152 lbs. His admission was complicated by atrial flutter for which he was given amiodarone but because of prolongation in QTc, this was discontinued, and he was continued on his PTA verapamil.       Following discharge he developed symptomatic hypotension and via phone was told to stop the sildenafil. He then saw Dr. Chad gusman for PH evaluation a few days later. He reported hypertension with SBP >200mmHg and weight was up 12 lbs from discharge. Torsemide 10 mg daily, Losartan 25 mg daily, and KCL 40 mEq were all  "started. She recommended resuming sildenafil once he was felt to be euvolemic.      Unfortunately, he was hospitalized again from 5/21 to 5/29. There was concern regarding his overall cognition vs confusion due to fluctuating blood sugars. It was noted that the medications he was truly taking were much different than what we had been attempting to adjust. His insulin pump was removed and regimen adjusted. A repeat chest CT showed no pneumonia and improvement in his effusions since placement of his pleurex. He again required IV lasix and was discharged on Lasix 40mg BID in place of the torsemide. Due to renal concerns, his hydrochlorothiazide and losartan were held. Fortunately, home health was arranged to help him manage his medications at home.     I saw Mr. Garcia in the [facility] clinic in person on 6/5/2020. His weight on our scale was down to 148 lbs though he felt that his lower extremity edema was \"about the same.\"  The home health nurse has been coming frequently to help with his medications. Unfortunately, blood sugars continued to remain poorly controlled. Labs at that visit showed a slight increase in his SCr to 1.66 and I asked him to reduce his lasix to 40mg daily. In addition, we discussed a plan to resume sildenafil. Unfortunately, his home care nurse called a few days later to report a weight gain of 5 lbs in 3-4 days. Thus, we asked him to go back to 40mg BID for his lasix. He followed up with nephrology shortly after, and iron infusions were recommended, but no changes were made to his diuretic regimen.    I last saw Tc in 6/16 for [facility] follow up. Weight was back up to 156 lbs, but he reported feeling much better after an iron infusion and noted more energy. Again, he said the swelling was \"about the same.\" His insulin regimen continued to be adjusted by his endocrine team. He admitted to not wear his compression socks daily and was also noted to maintain a fairly high salt diet which I educated him " about. His Scr had gone up further to 1.89 but as he was feeling better, I did not make any changes that visit.     Today we are having another [facility] follow up in efforts to avoid rehospitalization.  He tells me that over the last few days his breathing has worsened. He has conversational dyspnea during our visit. Weight is up to 159 today. He says he called his pulmonary team who requested a chest xray to see if his pleurex was working and asks if we can do this today. He denies any chest pain or palpitations. Notably, he is no longer receiving home health as of about a week ago.        Assessment/Plan:    1. Acute on chronic HFpEF.   --Overall, his situation has been complex. His most recent echo from May 2020 shows preserved EF 55-60%, with normal wall motion. RV was mild to moderately dilated with mildly decreased RV function. RHC during his hospital stay in May as below.    --Today his weight is up to 159 lbs, and he is visibly winded during our visit even at rest. Labs show SCr up to 2.2 from 1.89 last visit. He wonders if his pleurex is working properly and tells me that his pulmonologist has requested a chest xray. Labs show further bump in his SCr (NT-proBNP still pending). Given his clinical worsening, we will take him to the ED for further evaluation.     2. Pulmonary hypertension.   --RHC in May 2020 showed PA pressure of 28 mmHg (51/15), mean RA of 4mmHg, and mildly elevated filling pressures with a wedge of 12mmHg (Vwave 16). PVR was 3.9 Wood units and Carlos Manuel CO/CI was 4.09/2.23. With vasodilator challenge, his PVR normalized to 1.22 wood units. Therefore, he was deemed to have pulmonary hypertension out of proportion to his left heart disease.   --As per Dr. Bonilla's recommendations, once he is euvolemic, our plan is to slowly reintroduce sildenafil. However, this is currently on hold as his weight has continued to fluctuate.   --In the future, when felt safe from a COVID standpoint, he could benefit from  pulmonary rehab.     3. Paroxysmal atrial fibrillation/flutter.   --Heart rates in 80s at our visit. He is not currently on beta blockers, and remains on verapamil which I believe has been longstanding for him. Had a prolonged QTC on amiodarone in the past.    --He is on anticoagulation with apixaban 2.5mg BID due to age and renal function.     4. Hypertension.   --BP up again today but he reports somewhat lower at home. I believe there are plans ultimately to reintroduce losartan in the future per nephrology.       Follow up plan: As above; plan for ED evaluation today. Will need follow up arranged after.       Orders this Visit:  No orders of the defined types were placed in this encounter.    No orders of the defined types were placed in this encounter.    Medications Discontinued During This Encounter   Medication Reason     polyethylene glycol (MIRALAX) 17 g packet Stopped by Patient         CURRENT MEDICATIONS:  Current Outpatient Medications   Medication Sig Dispense Refill     acetaminophen (TYLENOL) 325 MG tablet Take 2 tablets (650 mg) by mouth every 4 hours as needed for mild pain  0     apixaban ANTICOAGULANT (ELIQUIS) 5 MG tablet Take 1/2 tablet (2.5mg) by mouth twice daily. You will have your labs checked on 5/4/20 and your PCP will direct you when it is safe to increase your dose back to 1 tablet (5mg) twice daily.       furosemide (LASIX) 40 MG tablet Take 1 tablet (40 mg) by mouth 2 times daily 180 tablet 0     glucagon 1 MG kit 1 mg as needed for low blood sugar       insulin aspart (NOVOLOG PEN) 100 UNIT/ML pen Inject 8 units subcutaneous with breakfast, 8 units subcutaneous with lunch and 4 units with supper. 3 mL 0     insulin glargine (LANTUS PEN) 100 UNIT/ML pen Inject 10 Units Subcutaneous every morning 3 mL 0     LOVASTATIN 20 MG PO TABS 1 TABLET DAILY AT DINNER 90 Tab 0     nystatin (MYCOSTATIN) 607727 UNIT/GM external cream Apply topically 2 times daily as needed        potassium chloride  ER (KLOR-CON M) 10 MEQ CR tablet Take 1 tablet (10 mEq) by mouth 2 times daily 60 tablet 0     tiotropium (SPIRIVA) 18 MCG inhaled capsule Inhale 18 mcg into the lungs daily       verapamil ER (VERELAN) 240 MG 24 hr capsule Take 1 capsule (240 mg) by mouth At Bedtime         ALLERGIES     Allergies   Allergen Reactions     No Known Drug Allergies        PAST MEDICAL HISTORY:  Past Medical History:   Diagnosis Date     Anemia      Anemia of chronic disease 2020     CKD (chronic kidney disease) stage 3, GFR 30-59 ml/min (H)      CRF (chronic renal failure), stage 3  2020     Fall 2019    suffered multiple left rib fractures, C3 and T2 fractures     Mixed hyperlipidemia 2004     Paroxysmal atrial fibrillation (H)      Personal history of colonic polyps     gets colonoscopy every five years, due in      Pleural effusion on left 2019    after multiple rib fractures     Pulmonary hypertension (H) 5/10/2020    Added automatically from request for surgery [id number]     Recurrent Left Pleural effusion -- S/P Pleurex Cath 2019     Rosacea      Type II or unspecified type diabetes mellitus without mention of complication, not stated as uncontrolled      Unspecified essential hypertension        PAST SURGICAL HISTORY:  Past Surgical History:   Procedure Laterality Date     ANESTHESIA CARDIOVERSION N/A 2/3/2020    Procedure: ANESTHESIA, FOR CARDIOVERSION;  Surgeon: GENERIC ANESTHESIA PROVIDER;  Location:  OR      RIGHT HEART CATH N/A 2020    Procedure: Right Heart Cath;  Surgeon: Senthil Silva MD;  Location:  HEART CARDIAC CATH LAB     HC REMOVE TONSILS/ADENOIDS,<13 Y/O      T & A <11y.o.     IR CHEST TUBE DRAIN TUNNELED LEFT  2020       FAMILY HISTORY:  Family History   Problem Relation Age of Onset     Family History Negative Mother          age 71     Family History Negative Father          age 70     Diabetes Brother         alive  age 77     Diabetes Brother         alive age 76     Family History Negative Brother              Family History Negative Brother              Diabetes Sister         alive age76     Family History Negative Sister          age 86     Heart Disease Sister              Family History Negative Sister              Family History Negative Sister              Diabetes Sister      Diabetes Sister      Diabetes Brother      Diabetes Brother        SOCIAL HISTORY:  Social History     Socioeconomic History     Marital status:      Spouse name: Lianna     Number of children: 4     Years of education: 16     Highest education level: None   Occupational History     Occupation: [employer]     Employer: [employer]    Social Needs     Financial resource strain: None     Food insecurity     Worry: None     Inability: None     Transportation needs     Medical: None     Non-medical: None   Tobacco Use     Smoking status: Former Smoker     Packs/day: 0.20     Years: 40.00     Pack years: 8.00     Types: Cigarettes     Start date:      Last attempt to quit: 2008     Years since quittin.5     Smokeless tobacco: Never Used     Tobacco comment: occasional   Substance and Sexual Activity     Alcohol use: Not Currently     Alcohol/week: 35.0 standard drinks     Drug use: Not Currently     Sexual activity: None   Lifestyle     Physical activity     Days per week: None     Minutes per session: None     Stress: None   Relationships     Social connections     Talks on phone: None     Gets together: None     Attends Jehovah's witness service: None     Active member of club or organization: None     Attends meetings of clubs or organizations: None     Relationship status: None     Intimate partner violence     Fear of current or ex partner: None     Emotionally abused: None     Physically abused: None     Forced sexual activity: None   Other Topics Concern     Parent/sibling  "w/ CABG, MI or angioplasty before 65F 55M? Not Asked   Social History Narrative     None       Review of Systems:  Cardiovascular: negative for chest pain, palpitations, orthopnea, pos LE edema  Constitutional: negative for chills, sweats, fevers. Pos fatigue. Pos itching all over.  Resp: Negative for dyspnea at rest, pos dyspnea on exertion, neg cough, wheezing, pos known lung disease/recurrent pleural effusion.   HEENT: Negative for new visual changes, frequent headaches.   Gastrointestinal: negative for abdominal pain, diarrhea, vomiting.  Hematologic/lymphatic: pos for current systemic anticoagulation  Neurological: negative for focal weakness, LOC, seizures, syncope. Pos mild dizziness.       Physical Exam:  Vitals: BP (!) 146/75   Pulse 89   Ht 1.778 m (5' 10\")   Wt 72.2 kg (159 lb 3.2 oz)   SpO2 100%   BMI 22.84 kg/m     Wt Readings from Last 4 Encounters:   07/08/20 72.2 kg (159 lb 3.2 oz)   06/16/20 70.8 kg (156 lb)   06/05/20 67.5 kg (148 lb 12.8 oz)   05/29/20 69.4 kg (153 lb)       GEN:  In general, this is a well nourished elderly male. He has conversational dyspnea at rest during our exam. He is ambulatory, unaccompanied.   RESP: Brief auscultation reveals no wheezing or rhonchi. Does seem somewhat diminished left base.   EXTREM: Still 1+ ankle bilateral edema, with chronic venous stasis changes. No cyanosis or clubbing.  NEURO: Alert and oriented, cooperative. Gait not assessed. No obvious focal deficits.       Recent Lab Results:    BMP RESULTS:  Lab Results   Component Value Date     07/08/2020    POTASSIUM 4.4 07/08/2020    CHLORIDE 99 07/08/2020    CO2 31 (H) 07/08/2020    ANIONGAP 10.4 07/08/2020     (H) 07/08/2020    BUN 22 07/08/2020    CR 2.20 (H) 07/08/2020    GFRESTIMATED 29 (L) 07/08/2020    GFRESTBLACK 35 (L) 07/08/2020    LLOYD 9.4 07/08/2020        Additional pertinent testing:  Echo 5/11/2020     Interpretation Summary     1. Left ventricular systolic function is normal. " The visual ejection fraction  is estimated at 55-60%.  2. No regional wall motion abnormalities noted.  3. The right ventricle is mild to moderately dilated. Mildly decreased right  ventricular systolic function  4. There is mild (1+) tricuspid regurgitation.  5. Moderate pulmonary hypertension; The right ventricular systolic pressure is  approximated at 47mmHg plus the right atrial pressure. IVC diameter and  respiratory changes fall into an intermediate range suggesting an RA pressure  of 8 mmHg.  6. Left pleural effusion is present.  7. In comparison with the prior report, estimated pulmonary pressures have  increased somewhat, otherwise, no significant change        Katlin Fontanez PA-C  [facility]  Pager [phone]

## 2020-07-08 NOTE — ED PROVIDER NOTES
History     Chief Complaint:  Shortness of Breath    HPI   Tc Garcia is a 80 year old male with a history of type 2 diabetes, atrial fibrillation, hemothorax on left, pulmonary hypertension,  recurrent left pleural effusion, anticoagulated on Eliquis, who presents with shortness of breath. Patient reports feeling more winded than baseline and appears winded during conversation but feels that he is not overly short of breath. The patient had been seen by his primary care today where they noted him to be winded and referred him to the emergency department for xray's. Patient denies any leg changes, chest pain, cough, runny nose, sore throat, or abdominal pain, but does feel itchy and this is his predominant complaint. The patient has not been using his pleural vac because he ran out of supplies, but before he ran out he was only draining once a week because he reported minimal drainage and ran out of supplies.    Allergies:  No Known Drug Allergies    Medications:    apixaban  furosemide  glucagon  Novolog  Lantus  Lovastatin  Nystatin  Potassium chloride  Tiotropium   Verapamil ER  Revatio  Eliquis  Albuterol    Past Medical History:    Anemia of chronic disease   CKD   CRF    ABBIE  CRF  Atrial fibrillation  Essential hypertension  Mixed hyperlipidemia   Paroxysmal atrial fibrillation  Personal history of colonic polyps   Pleural effusion on left   Pulmonary hypertension   Recurrent Left Pleural effusion  Rosacea   Type II or unspecified type diabetes mellitus without mention of complication, not stated as uncontrolled   Unspecified essential hypertension   Plantar fascial fibromatosis  Hemothorax on left  Acute respiratory failure with hypoxia     Past Surgical History:    S/P Pleurex Cath   CV Right heart cath  Cardioversion  Tonsillectomy, Adenoidectomy, Bilateral myringotomy and tubes  IR Chest tube drain  Cardiac Catheterization    Family History:    Brother: Diabetes  Sister: Diabetes, Heart  disease    Social History:  The patient was unaccompanied to the ED.  Smoking Status: Former Smoker  Smokeless Tobacco: Never Used  Alcohol Use: Not currently  Drug Use: Not currently  PCP: Charlie Finch    Review of Systems   All other systems reviewed and are negative.    Physical Exam     Patient Vitals for the past 24 hrs:   BP Temp Temp src Pulse Heart Rate Resp SpO2   07/08/20 1145 (!) 174/100 -- -- 108 -- -- 100 %   07/08/20 1135 -- -- -- -- -- -- 100 %   07/08/20 1118 (!) 145/125 98.1  F (36.7  C) Oral 82 82 18 100 %       Physical Exam  General: Resting on the bed.  Head: No obvious trauma to head.  Ears, Nose, Throat:  External ears normal.  Nose normal.   Eyes:  Conjunctivae clear.  Pupils are equal, round, and reactive.   Neck: Normal range of motion.  Neck supple.   CV: Regular rate and rhythm.  No murmurs.      Respiratory: Effort normal and breath sounds normal, diminished in the left lower lobe.    Gastrointestinal: Soft.  No distension. There is no tenderness.  There is no rigidity, no rebound and no guarding.  palpable chest tube drain site in the left upper abdomen.  Mild erythema but blanching surrounding site.    Musculoskeletal: pitting lower and upper extremity edema.     Neuro: Alert. Moving all extremities appropriately.  Normal speech.    Skin: Skin is warm and dry.  No rash noted.     Emergency Department Course     ECG:  ECG taken at 1149  Atrial fibrillation  Abnormal ECG  Rate 80 bpm. DC interval * ms. QRS duration 92 ms. QT/QTc 354/408 ms. P-R-T axes * 48 52.    Imaging:  Radiology findings were communicated with the patient who voiced understanding of the findings.    XR Chest Port 1 View  A small left pneumothorax has developed, with a Pleurx  catheter in the left pleural space. Small to moderate size left  pleural effusion is unchanged. Underlying scarring or atelectasis in  the left mid and lower lung, also similar to previous. The right lung  remains clear.  Multiple old  rib fractures.  CEDRICK GERMAIN MD  Reading per radiology     Laboratory:  Laboratory findings were communicated with the patient who voiced understanding of the findings.    COVID-19 Virus (Coronavirus) by PCR Nasopharyngeal swab: Pending    UA: Urine  (A), pH Urine 7.5 (H) o/w WNL    CBC: WBC 15.1 (H), HGB 10.7 (H),  (H)    Troponin (Collected 1306): <0.015    Procalcitonin: 0.10    Interventions:  Medications - No data to display     Emergency Department Course:     Nursing notes and vitals reviewed. I performed an exam of the patient as documented above.     1149 EKG obtained as noted above.     1223 IV was inserted and blood was drawn for laboratory testing, results above.    1241 The patient was sent for a XR while in the emergency department, results above.      I spoke with IR regarding patient. They will come down to the ER to evaluate patient's vac.    1329 The patient provided a urine sample here in the emergency department. This was sent for laboratory testing, findings above.    1330 I spoke with Dr. Sylvester of the Hospitalist service from [facility] regarding patient's presentation, findings, and plan of care.     Prior to admit, I personally reviewed the results with the patient and all related questions were answered. The patient verbalized understanding and is amenable to plan.     Impression & Plan      Medical Decision Making:  Cedrick Garcia is a 80 year old male who presents to the emergency department today for evaluation of shortness of breath.  Vital signs are stable.  Patient has known atrial fibrillation, heart rates are less than 110.  Broad differential was pursued including not limited to pneumonia, pneumothorax, UTI, pyelonephritis, ACS, arrhythmia, electrolyte, metabolic, renal dysfunction, CHF exacerbation, PE, pleural effusion, etc.  CBC shows mild leukocytosis 15.1, hemoglobin stable at 10.7.  Pro-Hai added on and low, 0.10.  Overall low risk for infection at  this time.  BMP shows no acute electrolyte abnormality but elevated creatinine from baseline.  Creatinine is 2.2 today, up from 3 weeks ago at 1.89.  It appears his baseline is closer to 1.6.  Hyperglycemia without evidence of DKA at this time.  BNP elevated at 2200, up from 2000 3 weeks prior.  EKG shows atrial fibrillation, no evidence acute ST-T wave changes.  Troponin is normal, not suggestive of ACS at this time.  UA grossly unremarkable without evidence of acute UTI.  Chest x-ray does show a small pneumothorax and moderate-sized left pleural effusion.  I consulted with IR regarding these findings.  Patient was uncertain how to care for his chest wall drain.  They were able to drain off fluid in the ER.  My first goals of care for the patient were to figure out the chest wall drain and to ensure that it is draining.  After this is done a repeat x-ray was done.  I spoke with hospitalist for admission.  Patient may need diuresis as well.  Patient was graciously accepted by the hospitalist.    Covid-19  Tc Garcia was evaluated during a global COVID-19 pandemic, which necessitated consideration that the patient might be at risk for infection with the SARS-CoV-2 virus that causes COVID-19.   Applicable protocols for evaluation were followed during the patient's care.   COVID-19 was considered as part of the patient's evaluation. The plan for testing is:  a test was obtained during this visit.    Diagnosis:    ICD-10-CM    1. Pleural effusion  J90 Procalcitonin     UA reflex to Microscopic     UA reflex to Microscopic     CANCELED: *UA reflex to Microscopic (ED Lab POCT Only 3-11)   2. Acute on chronic congestive heart failure, unspecified heart failure type (H)  I50.9    3. Shortness of breath  R06.02        Disposition:   The patient is admitted into the care of Dr. Sylvester.    Scribe Disclosure:  I, Jon Kuo, am serving as a scribe at 12:14 PM on 7/8/2020 to document services personally performed by  Josefina Gandhi MD based on my observations and the provider's statements to me.    Scribe Disclosure:  I, Orla Severson, am serving as a scribe at 12:34 PM on 7/8/2020 to document services personally performed by Josefina Gandhi MD based on my observations and the provider's statements to me.     EMERGENCY DEPARTMENT       Josefina Gandhi MD  07/08/20 1448

## 2020-07-08 NOTE — ED TRIAGE NOTES
Patient presents with shortness of breath and a rash.  States he has a pulerx cath to drain fluid from his lungs and it hasnt been working.

## 2020-07-08 NOTE — PROGRESS NOTES
RECEIVING UNIT ED HANDOFF REVIEW    ED Nurse Handoff Report was reviewed by: Janae Vázquez RN on July 8, 2020 at 2:23 PM

## 2020-07-08 NOTE — PROGRESS NOTES
A&O x4. VSS on room air. Tele SR. Denies CP/SOB/pain. Up with standby assist. Pleurex cathter in place to L lateral chest, 550 ml output in IR. IV lasix given.CXR completed. Plan for cards consult tomorrow, will continue to monitor.

## 2020-07-08 NOTE — LETTER
7/8/2020    Charlie Finch MD  [address]  [facility] [address]    RE: Tc Garcia       Dear Colleague,    I had the pleasure of seeing Tc Garcia in the [facility].      Cardiology Progress Note    Date of Service: 07/08/2020      Reason for visit: Follow up pulmonary hypertension, HFpEF.    Cardiology team: Dr. Julien (general), Dr. aCmejo (EP), Dr. Bonilla (PH), Annette Mejía PA-C ([facility])      HPI:  Mr. Garcia is an 80 year old male with a PMhx including poorly controlled diabetes, HTN, chronic anemia, recurrent left pleural effusion, paroxysmal atrial fibrillation/flutter, and HFpEF with pulmonary hypertension. He was hospitalized in early May 2020 with progressive dyspnea and due to recurrent (transudative) pleural effusion a left PleurX catheter was placed. He was diuresed with IV furosemide and given empiric abx therapy. Echo showed normal LVEF 55-60% with normal wall motion. The RV was moderately dilated with decreased systolic function and mild TR. He underwent a RHC during that stay that showed mild PH (PA pressure of 28 mmHg (51/15), mean RA of 4mmHg, and mildly elevated filling pressures with a wedge of 12mmHg (Vwave 16). PVR was 3.9 Wood units and Carlos Manuel CO/CI was 4.09/2.23. With vasodilator challenge, his PVR normalized to 1.22 wood units. Therefore, he was deemed to have pulmonary hypertension out of proportion to his left heart disease. He was started on Cialis 5mg daily but because this was not a formulated preparation, was switched to sildenafil 20 mg 3 times daily and discharged home. Interestingly, he was not discharged on any diuretics. Discharge wt was 152 lbs. His admission was complicated by atrial flutter for which he was given amiodarone but because of prolongation in QTc, this was discontinued, and he was continued on his PTA verapamil.       Following discharge he developed symptomatic hypotension and via phone was told to stop the sildenafil.  "He then saw Dr. Chad gusman for PH evaluation a few days later. He reported hypertension with SBP >200mmHg and weight was up 12 lbs from discharge. Torsemide 10 mg daily, Losartan 25 mg daily, and KCL 40 mEq were all started. She recommended resuming sildenafil once he was felt to be euvolemic.      Unfortunately, he was hospitalized again from 5/21 to 5/29. There was concern regarding his overall cognition vs confusion due to fluctuating blood sugars. It was noted that the medications he was truly taking were much different than what we had been attempting to adjust. His insulin pump was removed and regimen adjusted. A repeat chest CT showed no pneumonia and improvement in his effusions since placement of his pleurex. He again required IV lasix and was discharged on Lasix 40mg BID in place of the torsemide. Due to renal concerns, his hydrochlorothiazide and losartan were held. Fortunately, home health was arranged to help him manage his medications at home.     I saw Mr. Garcia in the [facility] clinic in person on 6/5/2020. His weight on our scale was down to 148 lbs though he felt that his lower extremity edema was \"about the same.\"  The home health nurse has been coming frequently to help with his medications. Unfortunately, blood sugars continued to remain poorly controlled. Labs at that visit showed a slight increase in his SCr to 1.66 and I asked him to reduce his lasix to 40mg daily. In addition, we discussed a plan to resume sildenafil. Unfortunately, his home care nurse called a few days later to report a weight gain of 5 lbs in 3-4 days. Thus, we asked him to go back to 40mg BID for his lasix. He followed up with nephrology shortly after, and iron infusions were recommended, but no changes were made to his diuretic regimen.    I last saw Tc in 6/16 for CORE follow up. Weight was back up to 156 lbs, but he reported feeling much better after an iron infusion and noted more energy. Again, he said the swelling " "was \"about the same.\" His insulin regimen continued to be adjusted by his endocrine team. He admitted to not wear his compression socks daily and was also noted to maintain a fairly high salt diet which I educated him about. His Scr had gone up further to 1.89 but as he was feeling better, I did not make any changes that visit.     Today we are having another CORE follow up in efforts to avoid rehospitalization.  He tells me that over the last few days his breathing has worsened. He has conversational dyspnea during our visit. Weight is up to 159 today. He says he called his pulmonary team who requested a chest xray to see if his pleurex was working and asks if we can do this today. He denies any chest pain or palpitations. Notably, he is no longer receiving home health as of about a week ago.        Assessment/Plan:    1. Acute on chronic HFpEF.   --Overall, his situation has been complex. His most recent echo from May 2020 shows preserved EF 55-60%, with normal wall motion. RV was mild to moderately dilated with mildly decreased RV function. RHC during his hospital stay in May as below.    --Today his weight is up to 159 lbs, and he is visibly winded during our visit even at rest. Labs show SCr up to 2.2 from 1.89 last visit. He wonders if his pleurex is working properly and tells me that his pulmonologist has requested a chest xray. Labs show further bump in his SCr (NT-proBNP still pending). Given his clinical worsening, we will take him to the ED for further evaluation.     2. Pulmonary hypertension.   --RHC in May 2020 showed PA pressure of 28 mmHg (51/15), mean RA of 4mmHg, and mildly elevated filling pressures with a wedge of 12mmHg (Vwave 16). PVR was 3.9 Wood units and Carlos Manuel CO/CI was 4.09/2.23. With vasodilator challenge, his PVR normalized to 1.22 wood units. Therefore, he was deemed to have pulmonary hypertension out of proportion to his left heart disease.   --As per Dr. Bonilla's recommendations, once he " is euvolemic, our plan is to slowly reintroduce sildenafil. However, this is currently on hold as his weight has continued to fluctuate.   --In the future, when felt safe from a COVID standpoint, he could benefit from pulmonary rehab.     3. Paroxysmal atrial fibrillation/flutter.   --Heart rates in 80s at our visit. He is not currently on beta blockers, and remains on verapamil which I believe has been longstanding for him. Had a prolonged QTC on amiodarone in the past.    --He is on anticoagulation with apixaban 2.5mg BID due to age and renal function.     4. Hypertension.   --BP up again today but he reports somewhat lower at home. I believe there are plans ultimately to reintroduce losartan in the future per nephrology.       Follow up plan: As above; plan for ED evaluation today. Will need follow up arranged after.       Orders this Visit:  No orders of the defined types were placed in this encounter.    No orders of the defined types were placed in this encounter.    Medications Discontinued During This Encounter   Medication Reason     polyethylene glycol (MIRALAX) 17 g packet Stopped by Patient         CURRENT MEDICATIONS:  Current Outpatient Medications   Medication Sig Dispense Refill     acetaminophen (TYLENOL) 325 MG tablet Take 2 tablets (650 mg) by mouth every 4 hours as needed for mild pain  0     apixaban ANTICOAGULANT (ELIQUIS) 5 MG tablet Take 1/2 tablet (2.5mg) by mouth twice daily. You will have your labs checked on 5/4/20 and your PCP will direct you when it is safe to increase your dose back to 1 tablet (5mg) twice daily.       furosemide (LASIX) 40 MG tablet Take 1 tablet (40 mg) by mouth 2 times daily 180 tablet 0     glucagon 1 MG kit 1 mg as needed for low blood sugar       insulin aspart (NOVOLOG PEN) 100 UNIT/ML pen Inject 8 units subcutaneous with breakfast, 8 units subcutaneous with lunch and 4 units with supper. 3 mL 0     insulin glargine (LANTUS PEN) 100 UNIT/ML pen Inject 10 Units  Subcutaneous every morning 3 mL 0     LOVASTATIN 20 MG PO TABS 1 TABLET DAILY AT DINNER 90 Tab 0     nystatin (MYCOSTATIN) 134822 UNIT/GM external cream Apply topically 2 times daily as needed        potassium chloride ER (KLOR-CON M) 10 MEQ CR tablet Take 1 tablet (10 mEq) by mouth 2 times daily 60 tablet 0     tiotropium (SPIRIVA) 18 MCG inhaled capsule Inhale 18 mcg into the lungs daily       verapamil ER (VERELAN) 240 MG 24 hr capsule Take 1 capsule (240 mg) by mouth At Bedtime         ALLERGIES     Allergies   Allergen Reactions     No Known Drug Allergies        PAST MEDICAL HISTORY:  Past Medical History:   Diagnosis Date     Anemia      Anemia of chronic disease 5/14/2020     CKD (chronic kidney disease) stage 3, GFR 30-59 ml/min (H)      CRF (chronic renal failure), stage 3  5/14/2020     Fall 11/2019    suffered multiple left rib fractures, C3 and T2 fractures     Mixed hyperlipidemia 7/7/2004     Paroxysmal atrial fibrillation (H)      Personal history of colonic polyps 1972    gets colonoscopy every five years, due in 2006     Pleural effusion on left 11/2019    after multiple rib fractures     Pulmonary hypertension (H) 5/10/2020    Added automatically from request for surgery [id number]     Recurrent Left Pleural effusion -- S/P Pleurex Cath 5/12/20 12/30/2019     Rosacea 1989     Type II or unspecified type diabetes mellitus without mention of complication, not stated as uncontrolled 1999     Unspecified essential hypertension 1994       PAST SURGICAL HISTORY:  Past Surgical History:   Procedure Laterality Date     ANESTHESIA CARDIOVERSION N/A 2/3/2020    Procedure: ANESTHESIA, FOR CARDIOVERSION;  Surgeon: GENERIC ANESTHESIA PROVIDER;  Location:  OR     CV RIGHT HEART CATH N/A 5/13/2020    Procedure: Right Heart Cath;  Surgeon: Senthil Silva MD;  Location:  HEART CARDIAC CATH LAB     HC REMOVE TONSILS/ADENOIDS,<11 Y/O      T & A <11y.o.     IR CHEST TUBE DRAIN TUNNELED LEFT  5/12/2020        FAMILY HISTORY:  Family History   Problem Relation Age of Onset     Family History Negative Mother          age 71     Family History Negative Father          age 70     Diabetes Brother         alive age 77     Diabetes Brother         alive age 76     Family History Negative Brother              Family History Negative Brother              Diabetes Sister         alive age76     Family History Negative Sister          age 86     Heart Disease Sister              Family History Negative Sister              Family History Negative Sister              Diabetes Sister      Diabetes Sister      Diabetes Brother      Diabetes Brother        SOCIAL HISTORY:  Social History     Socioeconomic History     Marital status:      Spouse name: Lianna     Number of children: 4     Years of education: 16     Highest education level: None   Occupational History     Occupation: [employer]     Employer: [employer]    Social Needs     Financial resource strain: None     Food insecurity     Worry: None     Inability: None     Transportation needs     Medical: None     Non-medical: None   Tobacco Use     Smoking status: Former Smoker     Packs/day: 0.20     Years: 40.00     Pack years: 8.00     Types: Cigarettes     Start date:      Last attempt to quit: 2008     Years since quittin.5     Smokeless tobacco: Never Used     Tobacco comment: occasional   Substance and Sexual Activity     Alcohol use: Not Currently     Alcohol/week: 35.0 standard drinks     Drug use: Not Currently     Sexual activity: None   Lifestyle     Physical activity     Days per week: None     Minutes per session: None     Stress: None   Relationships     Social connections     Talks on phone: None     Gets together: None     Attends Alevism service: None     Active member of club or organization: None     Attends meetings of clubs or organizations: None      "Relationship status: None     Intimate partner violence     Fear of current or ex partner: None     Emotionally abused: None     Physically abused: None     Forced sexual activity: None   Other Topics Concern     Parent/sibling w/ CABG, MI or angioplasty before 65F 55M? Not Asked   Social History Narrative     None       Review of Systems:  Cardiovascular: negative for chest pain, palpitations, orthopnea, pos LE edema  Constitutional: negative for chills, sweats, fevers. Pos fatigue. Pos itching all over.  Resp: Negative for dyspnea at rest, pos dyspnea on exertion, neg cough, wheezing, pos known lung disease/recurrent pleural effusion.   HEENT: Negative for new visual changes, frequent headaches.   Gastrointestinal: negative for abdominal pain, diarrhea, vomiting.  Hematologic/lymphatic: pos for current systemic anticoagulation  Neurological: negative for focal weakness, LOC, seizures, syncope. Pos mild dizziness.       Physical Exam:  Vitals: BP (!) 146/75   Pulse 89   Ht 1.778 m (5' 10\")   Wt 72.2 kg (159 lb 3.2 oz)   SpO2 100%   BMI 22.84 kg/m     Wt Readings from Last 4 Encounters:   07/08/20 72.2 kg (159 lb 3.2 oz)   06/16/20 70.8 kg (156 lb)   06/05/20 67.5 kg (148 lb 12.8 oz)   05/29/20 69.4 kg (153 lb)       GEN:  In general, this is a well nourished elderly male. He has conversational dyspnea at rest during our exam. He is ambulatory, unaccompanied.   RESP: Brief auscultation reveals no wheezing or rhonchi. Does seem somewhat diminished left base.   EXTREM: Still 1+ ankle bilateral edema, with chronic venous stasis changes. No cyanosis or clubbing.  NEURO: Alert and oriented, cooperative. Gait not assessed. No obvious focal deficits.       Recent Lab Results:    BMP RESULTS:  Lab Results   Component Value Date     07/08/2020    POTASSIUM 4.4 07/08/2020    CHLORIDE 99 07/08/2020    CO2 31 (H) 07/08/2020    ANIONGAP 10.4 07/08/2020     (H) 07/08/2020    BUN 22 07/08/2020    CR 2.20 (H) " 07/08/2020    GFRESTIMATED 29 (L) 07/08/2020    GFRESTBLACK 35 (L) 07/08/2020    LLOYD 9.4 07/08/2020        Additional pertinent testing:  Echo 5/11/2020     Interpretation Summary     1. Left ventricular systolic function is normal. The visual ejection fraction  is estimated at 55-60%.  2. No regional wall motion abnormalities noted.  3. The right ventricle is mild to moderately dilated. Mildly decreased right  ventricular systolic function  4. There is mild (1+) tricuspid regurgitation.  5. Moderate pulmonary hypertension; The right ventricular systolic pressure is  approximated at 47mmHg plus the right atrial pressure. IVC diameter and  respiratory changes fall into an intermediate range suggesting an RA pressure  of 8 mmHg.  6. Left pleural effusion is present.  7. In comparison with the prior report, estimated pulmonary pressures have  increased somewhat, otherwise, no significant change        Katlin Fontanez PA-C  [facility]  Pager [phone]        Thank you for allowing me to participate in the care of your patient.      Sincerely,     FORTUNATO Reynoso     [facility]    cc:   FORTUNATO Reynoso  [facility]  [address] [place] [id number]

## 2020-07-08 NOTE — ED NOTES
[facility]  ED Nurse Handoff Report    ED Chief complaint: Shortness of Breath      ED Diagnosis:   Final diagnoses:   Pleural effusion   Acute on chronic congestive heart failure, unspecified heart failure type (H)   Shortness of breath       Code Status: Full Code    Allergies:   Allergies   Allergen Reactions     No Known Drug Allergies        Patient Story: Pt has complex medical history. Recurrent pleural effusion on left with a tube placed in May.Pt states is not working now.  Focused Assessment:  A/O, can make needs known. Pt. Dyspneic and edema in bilat legs and arms. Pt denies pain.    Treatments and/or interventions provided: Labs, xray  Patient's response to treatments and/or interventions: Well    To be done/followed up on inpatient unit:  Monitor    Does this patient have any cognitive concerns?: None    Activity level - Baseline/Home:  Independent  Activity Level - Current:   Independent    Patient's Preferred language: English   Needed?: No    Isolation: None  Infection: Not Applicable    Bariatric?: No    Vital Signs:   Vitals:    07/08/20 1118 07/08/20 1135 07/08/20 1145   BP: (!) 145/125  (!) 174/100   Pulse: 82  108   Resp: 18     Temp: 98.1  F (36.7  C)     TempSrc: Oral     SpO2: 100% 100% 100%       Cardiac Rhythm:     Was the PSS-3 completed:   Yes  What interventions are required if any?               Family Comments: None  OBS brochure/video discussed/provided to patient/family: N/A              Name of person given brochure if not patient: NA              Relationship to patient: NA    For the majority of the shift this patient's behavior was Green.   Behavioral interventions performed were None.    ED NURSE PHONE NUMBER: [phone]

## 2020-07-08 NOTE — PROGRESS NOTES
RADIOLOGY PROCEDURE NOTE  Patient name: Tc Garcia  MRN: [id number]  : 1940    Pre-procedure diagnosis: Left chest tube disfunction  Post-procedure diagnosis: Same    Procedure Date/Time: 2020  2:46 PM  Procedure: Left chest tube in place, drainage to negative pressure bottle without complication  Estimated blood loss: None  Specimen(s) collected with description: 550ml transudative appearing fluid  The patient tolerated the procedure well with no immediate complications.  Significant findings:none    IR will follow along this admission for chest tube concerns    Provider name: Gilberto Nixon PA-C  Assistant(s):None

## 2020-07-09 LAB
ANION GAP SERPL CALCULATED.3IONS-SCNC: 5 MMOL/L (ref 3–14)
BUN SERPL-MCNC: 20 MG/DL (ref 7–30)
CALCIUM SERPL-MCNC: 8.5 MG/DL (ref 8.5–10.1)
CHLORIDE SERPL-SCNC: 100 MMOL/L (ref 94–109)
CO2 SERPL-SCNC: 29 MMOL/L (ref 20–32)
CREAT SERPL-MCNC: 1.53 MG/DL (ref 0.66–1.25)
ERYTHROCYTE [DISTWIDTH] IN BLOOD BY AUTOMATED COUNT: 17.7 % (ref 10–15)
GFR SERPL CREATININE-BSD FRML MDRD: 42 ML/MIN/{1.73_M2}
GLUCOSE BLDC GLUCOMTR-MCNC: 196 MG/DL (ref 70–99)
GLUCOSE BLDC GLUCOMTR-MCNC: 211 MG/DL (ref 70–99)
GLUCOSE BLDC GLUCOMTR-MCNC: 234 MG/DL (ref 70–99)
GLUCOSE BLDC GLUCOMTR-MCNC: 298 MG/DL (ref 70–99)
GLUCOSE SERPL-MCNC: 306 MG/DL (ref 70–99)
HCT VFR BLD AUTO: 32.3 % (ref 40–53)
HGB BLD-MCNC: 10.2 G/DL (ref 13.3–17.7)
LACTATE BLD-SCNC: 1.8 MMOL/L (ref 0.7–2)
MCH RBC QN AUTO: 27.4 PG (ref 26.5–33)
MCHC RBC AUTO-ENTMCNC: 31.6 G/DL (ref 31.5–36.5)
MCV RBC AUTO: 87 FL (ref 78–100)
PLATELET # BLD AUTO: 444 10E9/L (ref 150–450)
POTASSIUM SERPL-SCNC: 3.5 MMOL/L (ref 3.4–5.3)
RBC # BLD AUTO: 3.72 10E12/L (ref 4.4–5.9)
SODIUM SERPL-SCNC: 134 MMOL/L (ref 133–144)
WBC # BLD AUTO: 21 10E9/L (ref 4–11)

## 2020-07-09 PROCEDURE — 99232 SBSQ HOSP IP/OBS MODERATE 35: CPT | Performed by: INTERNAL MEDICINE

## 2020-07-09 PROCEDURE — 25000128 H RX IP 250 OP 636: Performed by: HOSPITALIST

## 2020-07-09 PROCEDURE — 25000131 ZZH RX MED GY IP 250 OP 636 PS 637: Performed by: HOSPITALIST

## 2020-07-09 PROCEDURE — 21000001 ZZH R&B HEART CARE

## 2020-07-09 PROCEDURE — 83605 ASSAY OF LACTIC ACID: CPT | Performed by: INTERNAL MEDICINE

## 2020-07-09 PROCEDURE — 25000132 ZZH RX MED GY IP 250 OP 250 PS 637: Performed by: HOSPITALIST

## 2020-07-09 PROCEDURE — 36415 COLL VENOUS BLD VENIPUNCTURE: CPT | Performed by: HOSPITALIST

## 2020-07-09 PROCEDURE — 00000146 ZZHCL STATISTIC GLUCOSE BY METER IP

## 2020-07-09 PROCEDURE — 80048 BASIC METABOLIC PNL TOTAL CA: CPT | Performed by: HOSPITALIST

## 2020-07-09 PROCEDURE — 85027 COMPLETE CBC AUTOMATED: CPT | Performed by: HOSPITALIST

## 2020-07-09 PROCEDURE — 36415 COLL VENOUS BLD VENIPUNCTURE: CPT | Performed by: INTERNAL MEDICINE

## 2020-07-09 RX ADMIN — VERAPAMIL HYDROCHLORIDE 240 MG: 240 TABLET, FILM COATED, EXTENDED RELEASE ORAL at 21:27

## 2020-07-09 RX ADMIN — INSULIN ASPART 4 UNITS: 100 INJECTION, SOLUTION INTRAVENOUS; SUBCUTANEOUS at 07:38

## 2020-07-09 RX ADMIN — FUROSEMIDE 40 MG: 10 INJECTION, SOLUTION INTRAMUSCULAR; INTRAVENOUS at 16:26

## 2020-07-09 RX ADMIN — PRAVASTATIN SODIUM 20 MG: 20 TABLET ORAL at 21:27

## 2020-07-09 RX ADMIN — APIXABAN 2.5 MG: 2.5 TABLET, FILM COATED ORAL at 20:26

## 2020-07-09 RX ADMIN — UMECLIDINIUM 1 PUFF: 62.5 AEROSOL, POWDER ORAL at 07:45

## 2020-07-09 RX ADMIN — APIXABAN 2.5 MG: 2.5 TABLET, FILM COATED ORAL at 07:45

## 2020-07-09 RX ADMIN — INSULIN GLARGINE 14 UNITS: 100 INJECTION, SOLUTION SUBCUTANEOUS at 07:48

## 2020-07-09 RX ADMIN — FUROSEMIDE 40 MG: 10 INJECTION, SOLUTION INTRAMUSCULAR; INTRAVENOUS at 07:40

## 2020-07-09 RX ADMIN — INSULIN ASPART 4 UNITS: 100 INJECTION, SOLUTION INTRAVENOUS; SUBCUTANEOUS at 12:00

## 2020-07-09 RX ADMIN — INSULIN ASPART 2 UNITS: 100 INJECTION, SOLUTION INTRAVENOUS; SUBCUTANEOUS at 19:19

## 2020-07-09 ASSESSMENT — ACTIVITIES OF DAILY LIVING (ADL)
ADLS_ACUITY_SCORE: 11

## 2020-07-09 NOTE — PROGRESS NOTES
"[facility]  Cardiology Progress Note    Date of Service (when I saw the patient): 07/09/2020     Assessment & Plan   Tc Garcia is a 80 year old male who was admitted on 7/8/2020. He carries a PMH significant for poorly controlled diabetes, hypertension, chronic anemia, recurrent left pleural effusion, PAF/flutter, and HFpEF with pulmonary hypertension.     1.  : Acute on chronic heart failure.   - Last echocardiogram demonstrated EF 55-60%, no wall motion abnormalites, Rv mild to moderately dilated, mildly decreased RV systolic function, mild TR, and moderate PH.   - RHC ( 5/13/20) demonstrated moderate PH with normal wedge pressure and low/normal cardiac index at baseline. Positive vasodilator study.   - BNP 2,210  - IV Lasix, down ~ 2kg overnight. .   - Diuresis not complete, continue IV Lasix one additional day.   - BMP showed sodium 134, potassium 3.5, Creatinine 1.53, GFR 42    2.  : Hypertension  - Well controlled    3.  : PAF   - Tele confirms A-fib with controlled rates  - Managed on Verapamil and Eliquis.  4. Diabetes   - Hgb A1C 7.4, managed on Insulin     OMAR Quezada CNP  Text Page  (M-F, 7:30 am - 4:00 pm)    ATTESTATION:  Mr. Garcia was seen and examined. Agree with note and plan of care. Responding well to diuresis and feeling much better. Likely transition back to oral diuretics tomorrow.   MADELINE Del Angel MD     Interval History   Feeling \" much better\" today. Denies chest pain, shortness of breath, palpitations, PND, orthopnea, presyncope or syncope. 1+ pitting edema to bilateral LE and left UE. Vitals and tele stable. IV Diuresis 1 additional day.     Physical Exam   Temp: 98.7  F (37.1  C) Temp src: Oral BP: 124/68 Pulse: 108 Heart Rate: 75 Resp: 18 SpO2: 93 % O2 Device: None (Room air)    Vitals:    07/08/20 1618 07/09/20 0645   Weight: 69.9 kg (154 lb) 67.9 kg (149 lb 11.2 oz)     Vital Signs with Ranges  Temp:  [98.1  F (36.7  C)-99.5  F (37.5  C)] 98.7  F (37.1 "  C)  Pulse:  [] 108  Heart Rate:  [75-92] 75  Resp:  [16-18] 18  BP: (124-174)/() 124/68  SpO2:  [92 %-100 %] 93 %  I/O last 3 completed shifts:  In: 390 [P.O.:390]  Out: 350 [Urine:350]    GEN:  In general, this is a well nourished male in no acute distress   HEENT:  Pupils equal, round. Sclerae nonicteric. Clear oropharynx. Mucous membranes moist.  NECK: Supple, no masses appreciated. Trachea midline. Hard to assess JVD   C/V:  Regular rate and rhythm, no murmur, rub or gallop. No S3 or RV heave.   RESP: Respirations are unlabored. No use of accessory muscles. Clear to auscultation bilaterally without wheezing, rales, or rhonchi.  GI: Abdomen soft, nontender, nondistended. No HSM appreciated.   EXTREM: 1+pitting bilateral LE edema. Left arm edema. No cyanosis or clubbing.   NEURO: Alert and oriented, cooperative. No obvious focal deficits.   PSYCH: Normal affect.  SKIN: Warm and dry. No rashes or petechiae appreciated.       Medications     - MEDICATION INSTRUCTIONS -         apixaban ANTICOAGULANT  2.5 mg Oral BID     furosemide  40 mg Intravenous BID     insulin aspart  1-7 Units Subcutaneous TID AC     insulin aspart  1-5 Units Subcutaneous At Bedtime     insulin glargine  14 Units Subcutaneous Daily     pravastatin  20 mg Oral At Bedtime     sodium chloride (PF)  3 mL Intracatheter Q8H     umeclidinium  1 puff Inhalation Daily     verapamil ER  240 mg Oral At Bedtime       Data   Reviewed       Carie Domínguez, OMAR CNP 7/9/2020

## 2020-07-09 NOTE — UTILIZATION REVIEW
"  Admission Status; Secondary Review Determination       Under the authority of the Utilization Management Committee, the utilization review process indicated a secondary review on the above patient. The review outcome is based on review of the medical records, discussions with staff, and applying clinical experience noted on the date of the review.     (x) Inpatient Status Appropriate - This patient's medical care is consistent with medical management for inpatient care and reasonable inpatient medical practice.     RATIONALE FOR DETERMINATION    80 year old male who was admitted on 7/8/2020. He carries a PMH significant for poorly controlled diabetes, hypertension, chronic anemia, recurrent left pleural effusion, PAF/flutter, and HFpEF with pulmonary hypertension. BNP 2,210, Diagnosed with Acute on chronic heart failure started on  IV Lasix. Er cards today:\"Diuresis not complete, continue IV Lasix \".The expected length of stay at the time of admission was more than 2 nights because of the severity of illness, intensity of service provided, and risk for adverse outcome. Inpatient admission is appropriate.     This document was produced using voice recognition software       The information on this document is developed by the utilization review team in order for the business office to ensure compliance. This only denotes the appropriateness of proper admission status and does not reflect the quality of care rendered.   The definitions of Inpatient Status and Observation Status used in making the determination above are those provided in the CMS Coverage Manual, Chapter 1 and Chapter 6, section 70.4.   Sincerely,   ALISON SPENCER MD   System Medical Director   Utilization Management   [facility].        "

## 2020-07-09 NOTE — PROVIDER NOTIFICATION
MD Notification    Notified Person: MD    Notified Person Name:Dr. Greene    Notification Date/Time:07/08/20 2202    Notification Interaction:text/page    Purpose of Notification:, administered 5 units novolog. Please advise, thanks.       Orders Received:    Comments:

## 2020-07-09 NOTE — PROGRESS NOTES
"SPIRITUAL HEALTH SERVICES  SPIRITUAL ASSESSMENT Progress Note  [facility]     REFERRAL SOURCE: Request at Admission    Tc reflected on how his many trips to the hospital have caused him to feel depressed at times, but he's feeling good today, because \"things are better than we thought.\"  He enjoys cooking and making up recipes that \"sometimes work and sometimes don't.\"   He has 4 children, the son lives in [facility] and was due to move back to the [place] on [place] 1, but \"Covid delayed that.\"  He is Jewish, but hasn't attended for awhile \"I'm not happy with how liberal the Restorationism has become.\"   He would like to receive Holy Communion and/or the sacrament of the sick.    I offered spiritual and emotional support through reflective listening that affirmed emotions, experience, and meaning. Offered assurance through prayer which incorporated conversational themes.    PLAN: I will ask Fr eKlleywilliam to bring communion. [facility] remains available for support during his hospitalization.    Kaya Perez  Associate   Pager [phone]  Phone [phone]    "

## 2020-07-09 NOTE — PLAN OF CARE
Neuro: Alert and oriented x 4  Recent vital signs:SBP slightly elevated  Respiratory: RA, LS dim  Pain:denies  Tele:SR with 1st AVB  Activity:SBA  Drips/Drains/IVF:  Skin:Bruises  GI/:mod CHO diet,   Aggression color:green  Plan:Test/Consult:cardiology consult  Labs:  and  211: covered with 5 units novolog  Misc: Left lower chest covered with dressing,

## 2020-07-09 NOTE — PROGRESS NOTES
A&O x4. VSS on room air. Tele SR. Denies CP/SOB/pain. Up with standby assist. (pt very steady, but had a recent fall at home). Plan to continue to diuresis with IV lasix, possible discharge home 7/10.

## 2020-07-09 NOTE — PROGRESS NOTES
Interventional Radiology Note:  Date: 2020   Patient name: Tc Garcia  MRN:[id number]  :  1940    Left thoracentesis performed yesterday in ER via Pleurx catheter (which was placed 20). Post procedure CXR shows decreased fluid and unchanged trace ptx which was present prior to procedure.     Per chart review, patient feeling much better today. Denies CP or SOB. Please contact IR if further concerns regarding chest tube during this admission. Will follow peripherally for now.    Also, recommend social work be involved with discharge planning to consider home health to help with tube cares and ordering supplies (it seems that patient had home health in the past but per  cardiology office visit note, for some reason that was discontinued). Alternately, his pulmonology team may have some resources to help with ordering supplies if he and his wife are able to manage tube cares, although he stated that sometimes this is difficult for them.    Tresa Arias PA-C  Interventional Radiology  [phone]

## 2020-07-09 NOTE — PROGRESS NOTES
[facility]    Hospitalist Progress Note    Interval History   - Reports significant improvement in his dyspnea and leg swelling since yesterday. No complaints today    Assessment & Plan   Summary: Tc Garcia is a 80 year old male with PMH poorly controlled DM2, HTN, chronic anemia, recurrent L pleurel effusion s/p pleurex catheter, paroxysmal afib/flutter, HFpEF with pulm HTN who was admitted 7/8/2020 with recurrent L pleural effusion and HFpEF exacerbation.    Shortness of breath  Acute on chronic HFpEF  Pulmonary Hypertension  taken off amiodarone and sildenifil in the last few months. BNP elevated. Problems with Pleurex catheter recently. NT pro BNP elevated to 2.2k, noted LE swelling. Creatinine improved from 2.2 to 1.5 with diuresis.  - Appreciate Cards input   - lasix 40 mg IV BID  - daily weights; intake/output  - Resume ARB at discharge    Recurrent Left pleural effusion s/p Pleurex placement  Left chest tube dysfunction  Small left pneumothorax  IR evaluated chest tube in ED and connected to suction with 550 mL of fluid out. IR will continue to follow chest tube during admission. Repeat CXR done after drainage with improvement in effusion - small ptx still noted.  - Monitor    Paroxysmal atrial fibrillation/flutter on anticoagulation  - continue Eliquis  - continue at bedtime Verapamil for rate control    DM2: previously controlled on insulin pump, but transitioned to lantus at last admission. He was supposed to have an appt today to resume insulin pump. Currently on 14 units lantus qam with carb based mealtime dosing. Hgb A1c 7.4 today.  - continue 14 units lantus qam  - medium dose sliding scale with meals  - consistent carb diet    ABBIE on CKD3, cardiorenal  Anemia in CKD  Creat elevated to 2.2 on admission --> 1.5 with diuresis.  - hold losartan given elevated creat      Chronic COPD not in exacerbation  - continue PTA inhalers    COVID 19 PCR negative 7/8/2020    DVT Prophylaxis:  Pneumatic Compression Devices  Code Status: Full Code  PT/OT: ordered  Diet: Combination Diet 4027-5305 Calories: Moderate Consistent CHO (4-6 CHO units/meal); 2 gm NA Diet      Disposition: Expected discharge  1-2 days back to home    Sandeep Solis MD  Text Page  (7am to 6pm)  -Data reviewed today: I reviewed all new labs and imaging results over the last 24 hours.    Physical Exam   Temp: 98.7  F (37.1  C) Temp src: Oral BP: 124/68   Heart Rate: 75 Resp: 18 SpO2: 93 % O2 Device: None (Room air)    Vitals:    07/08/20 1618 07/09/20 0645   Weight: 69.9 kg (154 lb) 67.9 kg (149 lb 11.2 oz)     Vital Signs with Ranges  Temp:  [98.2  F (36.8  C)-99.5  F (37.5  C)] 98.7  F (37.1  C)  Heart Rate:  [75-92] 75  Resp:  [16-18] 18  BP: (124-148)/(68-77) 124/68  SpO2:  [92 %-97 %] 93 %  I/O last 3 completed shifts:  In: 390 [P.O.:390]  Out: 350 [Urine:350]  O2 requirements: no    Constitutional: Male in NAD  HEENT: Eyes nonicteric, oral mucosa moist  Cardiovascular: Irregular, normal S1/2, mild systolic murmur  Respiratory: CTAB, no wheezing or crackles  Vascular: 1+ BLE pitting edema  GI: Normoactive bowel sounds, nontender  Skin/Integumen: No rashes  Neuro/Psych: Appropriate affect and mood. A&Ox3, moves all extremities    Medications     - MEDICATION INSTRUCTIONS -         apixaban ANTICOAGULANT  2.5 mg Oral BID     furosemide  40 mg Intravenous BID     insulin aspart  1-7 Units Subcutaneous TID AC     insulin aspart  1-5 Units Subcutaneous At Bedtime     insulin glargine  14 Units Subcutaneous Daily     pravastatin  20 mg Oral At Bedtime     sodium chloride (PF)  3 mL Intracatheter Q8H     umeclidinium  1 puff Inhalation Daily     verapamil ER  240 mg Oral At Bedtime       Data   Recent Labs   Lab 07/09/20  0549 07/08/20  1223 07/08/20  0944   WBC 21.0* 15.1*  --    HGB 10.2* 10.7*  --    MCV 87 88  --     506*  --      --  136   POTASSIUM 3.5  --  4.4   CHLORIDE 100  --  99   CO2 29  --  31*   BUN 20   --  22   CR 1.53*  --  2.20*   ANIONGAP 5  --  10.4   LLOYD 8.5  --  9.4   *  --  373*   TROPI  --  <0.015  --        Imaging:   Recent Results (from the past 24 hour(s))   XR Chest Port 1 View    Narrative    CHEST ONE VIEW  7/8/2020 5:48 PM     HISTORY: Follow up pneumothorax status post drainage.    COMPARISON: 7/18/2020      Impression    IMPRESSION: Trace apical pneumothorax on the left unchanged from  previous. Decreased fluid and associated atelectasis and/or infiltrate  at the left base. Right lung clear.    ALLYSON MARTINEZ MD

## 2020-07-10 ENCOUNTER — HOSPITAL ENCOUNTER (INPATIENT)
Facility: CLINIC | Age: 80
LOS: 7 days | Discharge: HOME | DRG: 178 | End: 2020-07-18
Attending: EMERGENCY MEDICINE | Admitting: INTERNAL MEDICINE
Payer: COMMERCIAL

## 2020-07-10 ENCOUNTER — APPOINTMENT (OUTPATIENT)
Dept: GENERAL RADIOLOGY | Facility: CLINIC | Age: 80
DRG: 178 | End: 2020-07-10
Attending: EMERGENCY MEDICINE
Payer: COMMERCIAL

## 2020-07-10 VITALS
BODY MASS INDEX: 21.36 KG/M2 | DIASTOLIC BLOOD PRESSURE: 61 MMHG | OXYGEN SATURATION: 96 % | WEIGHT: 148.9 LBS | RESPIRATION RATE: 18 BRPM | TEMPERATURE: 98.7 F | SYSTOLIC BLOOD PRESSURE: 118 MMHG | HEART RATE: 108 BPM

## 2020-07-10 DIAGNOSIS — R50.9 FEVER, UNSPECIFIED FEVER CAUSE: ICD-10-CM

## 2020-07-10 DIAGNOSIS — J86.9 EMPYEMA (H): ICD-10-CM

## 2020-07-10 DIAGNOSIS — J90 PLEURAL EFFUSION: Primary | ICD-10-CM

## 2020-07-10 DIAGNOSIS — N28.9 RENAL INSUFFICIENCY: ICD-10-CM

## 2020-07-10 DIAGNOSIS — J90 PLEURAL EFFUSION: ICD-10-CM

## 2020-07-10 LAB
ALBUMIN SERPL-MCNC: 2.5 G/DL (ref 3.4–5)
ALBUMIN UR-MCNC: 30 MG/DL
ALP SERPL-CCNC: 98 U/L (ref 40–150)
ALT SERPL W P-5'-P-CCNC: 13 U/L (ref 0–70)
ANION GAP SERPL CALCULATED.3IONS-SCNC: 4 MMOL/L (ref 3–14)
ANION GAP SERPL CALCULATED.3IONS-SCNC: 6 MMOL/L (ref 3–14)
APPEARANCE UR: CLEAR
AST SERPL W P-5'-P-CCNC: 12 U/L (ref 0–45)
BASOPHILS # BLD AUTO: 0 10E9/L (ref 0–0.2)
BASOPHILS NFR BLD AUTO: 0.1 %
BILIRUB SERPL-MCNC: 0.9 MG/DL (ref 0.2–1.3)
BILIRUB UR QL STRIP: NEGATIVE
BUN SERPL-MCNC: 24 MG/DL (ref 7–30)
BUN SERPL-MCNC: 27 MG/DL (ref 7–30)
CALCIUM SERPL-MCNC: 8.2 MG/DL (ref 8.5–10.1)
CALCIUM SERPL-MCNC: 9.1 MG/DL (ref 8.5–10.1)
CHLORIDE SERPL-SCNC: 100 MMOL/L (ref 94–109)
CHLORIDE SERPL-SCNC: 98 MMOL/L (ref 94–109)
CO2 SERPL-SCNC: 30 MMOL/L (ref 20–32)
CO2 SERPL-SCNC: 31 MMOL/L (ref 20–32)
COLOR UR AUTO: YELLOW
CREAT SERPL-MCNC: 1.51 MG/DL (ref 0.66–1.25)
CREAT SERPL-MCNC: 1.68 MG/DL (ref 0.66–1.25)
DIFFERENTIAL METHOD BLD: ABNORMAL
EOSINOPHIL # BLD AUTO: 0 10E9/L (ref 0–0.7)
EOSINOPHIL NFR BLD AUTO: 0 %
ERYTHROCYTE [DISTWIDTH] IN BLOOD BY AUTOMATED COUNT: 17.2 % (ref 10–15)
GFR SERPL CREATININE-BSD FRML MDRD: 37 ML/MIN/{1.73_M2}
GFR SERPL CREATININE-BSD FRML MDRD: 43 ML/MIN/{1.73_M2}
GLUCOSE BLDC GLUCOMTR-MCNC: 139 MG/DL (ref 70–99)
GLUCOSE BLDC GLUCOMTR-MCNC: 177 MG/DL (ref 70–99)
GLUCOSE BLDC GLUCOMTR-MCNC: 307 MG/DL (ref 70–99)
GLUCOSE SERPL-MCNC: 125 MG/DL (ref 70–99)
GLUCOSE SERPL-MCNC: 186 MG/DL (ref 70–99)
GLUCOSE UR STRIP-MCNC: NEGATIVE MG/DL
HCT VFR BLD AUTO: 33.6 % (ref 40–53)
HGB BLD-MCNC: 10.8 G/DL (ref 13.3–17.7)
HGB UR QL STRIP: NEGATIVE
IMM GRANULOCYTES # BLD: 0.1 10E9/L (ref 0–0.4)
IMM GRANULOCYTES NFR BLD: 0.5 %
INTERPRETATION ECG - MUSE: NORMAL
KETONES UR STRIP-MCNC: NEGATIVE MG/DL
LACTATE BLD-SCNC: 1.4 MMOL/L (ref 0.7–2)
LEUKOCYTE ESTERASE UR QL STRIP: ABNORMAL
LYMPHOCYTES # BLD AUTO: 2.1 10E9/L (ref 0.8–5.3)
LYMPHOCYTES NFR BLD AUTO: 7.2 %
MCH RBC QN AUTO: 27.8 PG (ref 26.5–33)
MCHC RBC AUTO-ENTMCNC: 32.1 G/DL (ref 31.5–36.5)
MCV RBC AUTO: 87 FL (ref 78–100)
MONOCYTES # BLD AUTO: 1.6 10E9/L (ref 0–1.3)
MONOCYTES NFR BLD AUTO: 5.6 %
MUCOUS THREADS #/AREA URNS LPF: PRESENT /LPF
NEUTROPHILS # BLD AUTO: 24.8 10E9/L (ref 1.6–8.3)
NEUTROPHILS NFR BLD AUTO: 86.6 %
NITRATE UR QL: NEGATIVE
NRBC # BLD AUTO: 0 10*3/UL
NRBC BLD AUTO-RTO: 0 /100
PH UR STRIP: 6 PH (ref 5–7)
PLATELET # BLD AUTO: 496 10E9/L (ref 150–450)
POTASSIUM SERPL-SCNC: 3.3 MMOL/L (ref 3.4–5.3)
POTASSIUM SERPL-SCNC: 4.2 MMOL/L (ref 3.4–5.3)
PROT SERPL-MCNC: 5.9 G/DL (ref 6.8–8.8)
RBC # BLD AUTO: 3.88 10E12/L (ref 4.4–5.9)
RBC #/AREA URNS AUTO: 2 /HPF (ref 0–2)
SODIUM SERPL-SCNC: 134 MMOL/L (ref 133–144)
SODIUM SERPL-SCNC: 135 MMOL/L (ref 133–144)
SOURCE: ABNORMAL
SP GR UR STRIP: 1.02 (ref 1–1.03)
SQUAMOUS #/AREA URNS AUTO: 1 /HPF (ref 0–1)
UROBILINOGEN UR STRIP-MCNC: 2 MG/DL (ref 0–2)
WBC # BLD AUTO: 28.7 10E9/L (ref 4–11)
WBC #/AREA URNS AUTO: 3 /HPF (ref 0–5)

## 2020-07-10 PROCEDURE — 85025 COMPLETE CBC W/AUTO DIFF WBC: CPT | Performed by: EMERGENCY MEDICINE

## 2020-07-10 PROCEDURE — 99285 EMERGENCY DEPT VISIT HI MDM: CPT | Mod: 25

## 2020-07-10 PROCEDURE — 80053 COMPREHEN METABOLIC PANEL: CPT | Performed by: EMERGENCY MEDICINE

## 2020-07-10 PROCEDURE — 99238 HOSP IP/OBS DSCHRG MGMT 30/<: CPT | Performed by: INTERNAL MEDICINE

## 2020-07-10 PROCEDURE — 96360 HYDRATION IV INFUSION INIT: CPT | Mod: 59

## 2020-07-10 PROCEDURE — C9803 HOPD COVID-19 SPEC COLLECT: HCPCS

## 2020-07-10 PROCEDURE — 00000146 ZZHCL STATISTIC GLUCOSE BY METER IP

## 2020-07-10 PROCEDURE — 25000132 ZZH RX MED GY IP 250 OP 250 PS 637: Performed by: INTERNAL MEDICINE

## 2020-07-10 PROCEDURE — 71046 X-RAY EXAM CHEST 2 VIEWS: CPT

## 2020-07-10 PROCEDURE — 99232 SBSQ HOSP IP/OBS MODERATE 35: CPT | Mod: CS | Performed by: INTERNAL MEDICINE

## 2020-07-10 PROCEDURE — 25000128 H RX IP 250 OP 636: Performed by: EMERGENCY MEDICINE

## 2020-07-10 PROCEDURE — 25800030 ZZH RX IP 258 OP 636: Performed by: EMERGENCY MEDICINE

## 2020-07-10 PROCEDURE — 87040 BLOOD CULTURE FOR BACTERIA: CPT | Performed by: EMERGENCY MEDICINE

## 2020-07-10 PROCEDURE — 80048 BASIC METABOLIC PNL TOTAL CA: CPT | Performed by: INTERNAL MEDICINE

## 2020-07-10 PROCEDURE — 25000132 ZZH RX MED GY IP 250 OP 250 PS 637: Performed by: EMERGENCY MEDICINE

## 2020-07-10 PROCEDURE — U0003 INFECTIOUS AGENT DETECTION BY NUCLEIC ACID (DNA OR RNA); SEVERE ACUTE RESPIRATORY SYNDROME CORONAVIRUS 2 (SARS-COV-2) (CORONAVIRUS DISEASE [COVID-19]), AMPLIFIED PROBE TECHNIQUE, MAKING USE OF HIGH THROUGHPUT TECHNOLOGIES AS DESCRIBED BY CMS-2020-01-R: HCPCS | Performed by: EMERGENCY MEDICINE

## 2020-07-10 PROCEDURE — 25000132 ZZH RX MED GY IP 250 OP 250 PS 637: Performed by: HOSPITALIST

## 2020-07-10 PROCEDURE — 81001 URINALYSIS AUTO W/SCOPE: CPT | Performed by: EMERGENCY MEDICINE

## 2020-07-10 PROCEDURE — 25000131 ZZH RX MED GY IP 250 OP 636 PS 637: Performed by: HOSPITALIST

## 2020-07-10 PROCEDURE — 83605 ASSAY OF LACTIC ACID: CPT | Performed by: EMERGENCY MEDICINE

## 2020-07-10 PROCEDURE — 36415 COLL VENOUS BLD VENIPUNCTURE: CPT | Performed by: INTERNAL MEDICINE

## 2020-07-10 PROCEDURE — 93005 ELECTROCARDIOGRAM TRACING: CPT

## 2020-07-10 PROCEDURE — 25000128 H RX IP 250 OP 636: Performed by: HOSPITALIST

## 2020-07-10 PROCEDURE — 96365 THER/PROPH/DIAG IV INF INIT: CPT

## 2020-07-10 RX ORDER — ACETAMINOPHEN 325 MG/1
650 TABLET ORAL ONCE
Status: COMPLETED | OUTPATIENT
Start: 2020-07-10 | End: 2020-07-10

## 2020-07-10 RX ORDER — PIPERACILLIN SODIUM, TAZOBACTAM SODIUM 4; .5 G/20ML; G/20ML
4.5 INJECTION, POWDER, LYOPHILIZED, FOR SOLUTION INTRAVENOUS ONCE
Status: COMPLETED | OUTPATIENT
Start: 2020-07-10 | End: 2020-07-11

## 2020-07-10 RX ORDER — POTASSIUM CHLORIDE 1.5 G/1.58G
20-40 POWDER, FOR SOLUTION ORAL
Status: DISCONTINUED | OUTPATIENT
Start: 2020-07-10 | End: 2020-07-10 | Stop reason: HOSPADM

## 2020-07-10 RX ORDER — POTASSIUM CHLORIDE 29.8 MG/ML
20 INJECTION INTRAVENOUS
Status: DISCONTINUED | OUTPATIENT
Start: 2020-07-10 | End: 2020-07-10 | Stop reason: HOSPADM

## 2020-07-10 RX ORDER — POTASSIUM CHLORIDE 7.45 MG/ML
10 INJECTION INTRAVENOUS
Status: DISCONTINUED | OUTPATIENT
Start: 2020-07-10 | End: 2020-07-10 | Stop reason: HOSPADM

## 2020-07-10 RX ORDER — POTASSIUM CL/LIDO/0.9 % NACL 10MEQ/0.1L
10 INTRAVENOUS SOLUTION, PIGGYBACK (ML) INTRAVENOUS
Status: DISCONTINUED | OUTPATIENT
Start: 2020-07-10 | End: 2020-07-10 | Stop reason: HOSPADM

## 2020-07-10 RX ORDER — FUROSEMIDE 40 MG
40 TABLET ORAL
Status: DISCONTINUED | OUTPATIENT
Start: 2020-07-10 | End: 2020-07-10 | Stop reason: HOSPADM

## 2020-07-10 RX ORDER — VANCOMYCIN HYDROCHLORIDE 1 G/200ML
1000 INJECTION, SOLUTION INTRAVENOUS ONCE
Status: DISCONTINUED | OUTPATIENT
Start: 2020-07-10 | End: 2020-07-10 | Stop reason: CLARIF

## 2020-07-10 RX ORDER — POTASSIUM CHLORIDE 1500 MG/1
20-40 TABLET, EXTENDED RELEASE ORAL
Status: DISCONTINUED | OUTPATIENT
Start: 2020-07-10 | End: 2020-07-10 | Stop reason: HOSPADM

## 2020-07-10 RX ADMIN — POTASSIUM CHLORIDE 20 MEQ: 1500 TABLET, EXTENDED RELEASE ORAL at 12:29

## 2020-07-10 RX ADMIN — POTASSIUM CHLORIDE 40 MEQ: 1500 TABLET, EXTENDED RELEASE ORAL at 10:15

## 2020-07-10 RX ADMIN — INSULIN GLARGINE 14 UNITS: 100 INJECTION, SOLUTION SUBCUTANEOUS at 08:24

## 2020-07-10 RX ADMIN — FUROSEMIDE 40 MG: 10 INJECTION, SOLUTION INTRAMUSCULAR; INTRAVENOUS at 08:13

## 2020-07-10 RX ADMIN — SODIUM CHLORIDE 500 ML: 9 INJECTION, SOLUTION INTRAVENOUS at 22:13

## 2020-07-10 RX ADMIN — INSULIN ASPART 4 UNITS: 100 INJECTION, SOLUTION INTRAVENOUS; SUBCUTANEOUS at 12:34

## 2020-07-10 RX ADMIN — ACETAMINOPHEN 650 MG: 325 TABLET, FILM COATED ORAL at 22:06

## 2020-07-10 RX ADMIN — SODIUM CHLORIDE 1000 ML: 9 INJECTION, SOLUTION INTRAVENOUS at 23:47

## 2020-07-10 RX ADMIN — UMECLIDINIUM 1 PUFF: 62.5 AEROSOL, POWDER ORAL at 08:19

## 2020-07-10 RX ADMIN — INSULIN ASPART 1 UNITS: 100 INJECTION, SOLUTION INTRAVENOUS; SUBCUTANEOUS at 08:20

## 2020-07-10 RX ADMIN — PIPERACILLIN SODIUM AND TAZOBACTAM SODIUM 4.5 G: 4; .5 INJECTION, POWDER, LYOPHILIZED, FOR SOLUTION INTRAVENOUS at 23:47

## 2020-07-10 RX ADMIN — APIXABAN 2.5 MG: 2.5 TABLET, FILM COATED ORAL at 08:13

## 2020-07-10 ASSESSMENT — ACTIVITIES OF DAILY LIVING (ADL)
ADLS_ACUITY_SCORE: 11

## 2020-07-10 ASSESSMENT — MIFFLIN-ST. JEOR: SCORE: 1375.78

## 2020-07-10 NOTE — DISCHARGE SUMMARY
[facility]    Hospitalist Discharge Summary       Date of Admission:  7/8/2020  Date of Discharge:  7/10/2020  Discharging Provider: Sandeep Solis MD      Discharge Diagnoses   Recurrent Left pleural effusion s/p drainage via PleurX  Acute on chronic HFpEF exacerbation  Small left pneumothorax  ABBIE on CKD3, cardiorenal, resolved    Follow-ups Needed After Discharge   Follow-up Appointments     Follow-up and recommended labs and tests       Follow up with primary care provider, Charlie Finch, within 7 days   for hospital follow- up.  The following labs/tests are recommended: basic   metabolic panel in one week.    - Follow up with cardiology as scheduled           Hospital Course   Tc Garcia is a 80 year old male with PMH poorly controlled DM2, HTN, chronic anemia, recurrent L pleural effusion s/p pleurX catheter I 5/2020, paroxysmal afib/flutter, HFpEF with pulm HTN who was admitted 7/8/2020 with recurrent L pleural effusion and HFpEF exacerbation. Patient reported he had declining outputs on his PleurX catheter prior to admission and had essentially stopped draining it, with possibly plans to remove it soon. However he was noted to have a recurrent pleural effusion on admission. IR evaluated chest tube in ED and connected to suction with 550 mL of fluid out. Repeat CXR done after drainage with improvement in effusion - small ptx still noted. Patient was diuresed for an acute HFpEF exacerbation, creatinine improved from 2.2 to 1.5 this admission. He is back to his baseline functioning at disharge. Discussed with SW/CC to provide one PleurX kit at discharge; he is to follow up outpatient to determine further management of his PleurX.    COVID 19 PCR negative 7/8/2020    Consultations This Hospital Stay   CARDIOLOGY IP CONSULT    Code Status   Full Code    Time Spent on this Encounter   I, Sandeep Solis, personally saw the patient today and spent approximately 25 minutes discharging  this patient.       Sandeep Solis MD  [facility]  ______________________________________________________________________    Physical Exam   Vital Signs: Temp: 98.7  F (37.1  C) Temp src: Oral BP: 118/61   Heart Rate: 71 Resp: 18 SpO2: 96 % O2 Device: None (Room air)    Weight: 148 lbs 14.4 oz    Constitutional: Male in NAD  HEENT: Eyes nonicteric, oral mucosa moist  Cardiovascular: Irregular, normal S1/2, mild systolic murmur  Respiratory: CTAB, no wheezing or crackles  Vascular: 1+ BLE pitting edema  GI: Normoactive bowel sounds, nontender  Skin/Integumen: No rashes  Neuro/Psych: Appropriate affect and mood. A&Ox3, moves all extremities       Primary Care Physician   Charlie Finch    Discharge Disposition   Discharged to home  Condition at discharge: Stable    Significant Results and Procedures   Most Recent 3 CBC's:  Recent Labs   Lab Test 07/09/20  0549 07/08/20  1223 06/16/20  1313 05/25/20  0707   WBC 21.0* 15.1*  --  10.4   HGB 10.2* 10.7* 8.9* 8.9*   MCV 87 88  --  85    506*  --  378     Most Recent 3 BMP's:  Recent Labs   Lab Test 07/10/20  0555 07/09/20  0549 07/08/20  0944    134 136   POTASSIUM 3.3* 3.5 4.4   CHLORIDE 98 100 99   CO2 30 29 31*   BUN 24 20 22   CR 1.51* 1.53* 2.20*   ANIONGAP 6 5 10.4   LLOYD 8.2* 8.5 9.4   * 306* 373*     Most Recent 2 LFT's:  Recent Labs   Lab Test 05/21/20  1916 05/10/20  2207   AST 27 28   ALT 24 29   ALKPHOS 107 92   BILITOTAL 0.6 1.3   ,   Results for orders placed or performed during the hospital encounter of 07/08/20   XR Chest Port 1 View    Narrative    XR CHEST PORT 1 VW  7/8/2020 12:41 PM       INDICATION: shortness of breath  COMPARISON: 5/22/2020       Impression    IMPRESSION: A small left pneumothorax has developed, with a Pleurx  catheter in the left pleural space. Small to moderate size left  pleural effusion is unchanged. Underlying scarring or atelectasis in  the left mid and lower lung, also similar to  previous. The right lung  remains clear.    Multiple old rib fractures.    CEDRICK GERMAIN MD   XR Chest Port 1 View    Narrative    CHEST ONE VIEW  7/8/2020 5:48 PM     HISTORY: Follow up pneumothorax status post drainage.    COMPARISON: 7/18/2020      Impression    IMPRESSION: Trace apical pneumothorax on the left unchanged from  previous. Decreased fluid and associated atelectasis and/or infiltrate  at the left base. Right lung clear.    ALLYSON MARTINEZ MD       Discharge Orders      Basic metabolic panel     Basic metabolic panel     Discharge Order: F/U with Cardiac  [facility]      Reason for your hospital stay    You were hospitalized for a mild heart failure exacerbation and fluid in your lungs.     Follow-up and recommended labs and tests     Follow up with primary care provider, Charlie Finch, within 7 days for hospital follow- up.  The following labs/tests are recommended: basic metabolic panel in one week.    - Follow up with cardiology as scheduled     Discharge Instructions    You should try and drain your PleurX catheter once to twice a week at discharge; follow up with the doctor managing your PleurX in about a week for more specific instructions.     Activity    Your activity upon discharge: activity as tolerated     Full Code     Diet    Follow this diet upon discharge:      Combination Diet 9161-4471 Calories: Moderate Consistent CHO (4-6 CHO units/meal); 2 gm NA Diet     Discharge Medications   Current Discharge Medication List      CONTINUE these medications which have NOT CHANGED    Details   acetaminophen (TYLENOL) 325 MG tablet Take 2 tablets (650 mg) by mouth every 4 hours as needed for mild pain  Qty:  , Refills: 0    Associated Diagnoses: Pain      albuterol (PROAIR HFA/PROVENTIL HFA/VENTOLIN HFA) 108 (90 Base) MCG/ACT inhaler Inhale 2 puffs into the lungs every 4 hours as needed for shortness of breath / dyspnea or wheezing Inhale 2 puffs every 4 to 6 hours as needed.    Comments: Pharmacy  may dispense brand covered by insurance (Proair, or proventil or ventolin or generic albuterol inhaler)      apixaban ANTICOAGULANT (ELIQUIS) 5 MG tablet Take 1/2 tablet (2.5mg) by mouth twice daily. You will have your labs checked on 5/4/20 and your PCP will direct you when it is safe to increase your dose back to 1 tablet (5mg) twice daily.    Associated Diagnoses: Chronic atrial fibrillation (H)      furosemide (LASIX) 40 MG tablet Take 1 tablet (40 mg) by mouth 2 times daily  Qty: 180 tablet, Refills: 0    Associated Diagnoses: Recurrent pleural effusion on left      glucagon 1 MG kit 1 mg as needed for low blood sugar      insulin aspart (NOVOLOG PEN) 100 UNIT/ML pen Inject 8 units subcutaneous with breakfast, 8 units subcutaneous with lunch and 4 units with supper.  Qty: 3 mL, Refills: 0    Comments: Future refills by PCP Dr. Senthil Moya with phone number [phone].  Associated Diagnoses: Diabetic ketoacidosis without coma associated with type 1 diabetes mellitus (H); Diabetic ketoacidosis without coma associated with diabetes mellitus due to underlying condition (H)      insulin glargine (LANTUS PEN) 100 UNIT/ML pen Inject 14 Units Subcutaneous every morning    Comments: If Lantus is not covered by insurance, may substitute Basaglar at same dose and frequency.        losartan (COZAAR) 25 MG tablet Take 25 mg by mouth daily      LOVASTATIN 20 MG PO TABS 1 TABLET DAILY AT DINNER  Qty: 90 Tab, Refills: 0    Associated Diagnoses: Mixed hyperlipidemia      nystatin (MYCOSTATIN) 188170 UNIT/GM external cream Apply topically 2 times daily as needed       potassium chloride ER (KLOR-CON M) 10 MEQ CR tablet Take 1 tablet (10 mEq) by mouth 2 times daily  Qty: 60 tablet, Refills: 0    Associated Diagnoses: Hypokalemia      tiotropium (SPIRIVA) 18 MCG inhaled capsule Inhale 18 mcg into the lungs daily      verapamil ER (VERELAN) 240 MG 24 hr capsule Take 1 capsule (240 mg) by mouth At Bedtime    Associated  Diagnoses: Atrial fibrillation, unspecified type (H)           Allergies   Allergies   Allergen Reactions     No Known Drug Allergies

## 2020-07-10 NOTE — PROGRESS NOTES
[facility]  Cardiology Progress Note    Date of Service (when I saw the patient): 07/10/2020       Assessment & Plan   Tc Garcia is a 80 year old male who was admitted on 7/8/2020. He carries a PMH significant for poorly controlled diabetes, hypertension, chronic anemia, recurrent left pleural effusion, PAF/flutter, and HFpEF with pulmonary hypertension.     1.  : Acute on chronic heart failure.   - Last echocardiogram demonstrated EF 55-60%, no wall motion abnormalites, Rv mild to moderately dilated, mildly decreased RV systolic function, mild TR, and moderate PH.   - RHC ( 5/13/20) demonstrated moderate PH with normal wedge pressure and low/normal cardiac index at baseline. Positive vasodilator study.   - BNP 2,210  - Diuresing well, down ~ 6lbs since admission  - Net neg 400 ml  - BMP showed sodium 134, potassium 3.3 ( replace per protocol), Creatinine 1.51, GFR 43  - Switch to PTA lasix 40mg BID   - Low grade fever yesterday, normal today.   - Follow up with cardiology in 1 week with BMP prior at PCP office - scheduled.   - Anticipate discharge today.      2.  : Hypertension  - Well controlled     3.  : PAF   - Tele confirms A-fib with controlled rates  - Managed on Verapamil and Eliquis.  4. Diabetes   - Hgb A1C 7.4, managed on Insulin          OMAR Quezada CNP  Text Page  (M-F, 7:30 am - 4:00 pm)    ATTESTATION:  Mr. Garcia was seen and examined. Agree with note and plan of care. Back to dry weight (148) today. Feels great. Will discharge on home dose of lasix 40mg BID, which was working for quite some time, with close [facility] follow up.  He understands and is in agreement.   MADELINE Del Angel MD    Interval History   Feeling well. No cardiac complaints, denies chest pain, shortness of breath, PND, orthopnea, or presyncope. 1+ bilateral LE edema. Diuresing well, switch to PO lasix. Vitals and tele stable.  Anticipate discharge today.     Physical Exam   Temp: 98.6  F (37  C) Temp  src: Oral BP: 119/69   Heart Rate: 73 Resp: 16 SpO2: 96 % O2 Device: None (Room air)    Vitals:    07/08/20 1618 07/09/20 0645 07/10/20 0510   Weight: 69.9 kg (154 lb) 67.9 kg (149 lb 11.2 oz) 67.5 kg (148 lb 14.4 oz)     Vital Signs with Ranges  Temp:  [97.6  F (36.4  C)-100.4  F (38  C)] 98.6  F (37  C)  Heart Rate:  [] 73  Resp:  [16-18] 16  BP: (112-156)/(63-81) 119/69  SpO2:  [94 %-96 %] 96 %  I/O last 3 completed shifts:  In: 420 [P.O.:420]  Out: 1100 [Urine:1100]    GEN:  In general, this is a thin elderly male in no acute distress  HEENT:  Pupils equal, round. Sclerae nonicteric. Clear oropharynx. Mucous membranes moist.  NECK: Supple, no masses appreciated. Trachea midline. No JVD   C/V:  Regular rate and irregular rhythm, no murmur, rub or gallop. No S3 or RV heave.   RESP: Respirations are unlabored. No use of accessory muscles. LLL crackles  GI: Abdomen soft, nontender, nondistended. No HSM appreciated.   EXTREM: 1+ pitting bilateral  LE edema. No cyanosis or clubbing.  NEURO: Alert and oriented, cooperative. No obvious focal deficits.   PSYCH: Normal affect.  SKIN: Warm and dry. No rashes or petechiae appreciated.       Medications     - MEDICATION INSTRUCTIONS -         apixaban ANTICOAGULANT  2.5 mg Oral BID     furosemide  40 mg Intravenous BID     insulin aspart  1-7 Units Subcutaneous TID AC     insulin aspart  1-5 Units Subcutaneous At Bedtime     insulin glargine  14 Units Subcutaneous Daily     pravastatin  20 mg Oral At Bedtime     sodium chloride (PF)  3 mL Intracatheter Q8H     umeclidinium  1 puff Inhalation Daily     verapamil ER  240 mg Oral At Bedtime       Data   Reviewed       OMAR Quezada CNP 7/10/2020

## 2020-07-10 NOTE — PLAN OF CARE
A&Ox4. VSS ex max temp 100.4. IV SL. RA. Blood glucose elevated 234, 139. Tele SD. SBA. Mod CHO diet, 2g NA. Denies pain and SOB. Pleurex drain CDI, redness around site. +1-2 BLE edema.

## 2020-07-10 NOTE — DISCHARGE INSTRUCTIONS
Pleural Effusion    The pleura is a smooth double membrane that surrounds the lungs. It separates the lungs from the chest wall. One side of the pleura attaches to the lung. The other side attaches to the chest wall. This membrane makes it easier for the chest to inflate and deflate as you breathe without rubbing against the ribs. You normally have a small amount of lubricating fluid (pleural fluid) between the pleural membranes.  A pleural effusion is when too much fluid collects in the space between the two pleural membranes (pleural space). As the amount of fluid increases, it begins to press on the lung. This makes it harder to take a full breath.  There are two types of pleural effusion:    Inflammatory. This is caused by a lung disease like pneumonia or lung cancer, which irritates the pleura.    Noninflammatory. This is caused by abnormal fluid pressures inside the lungs. The pressure can be caused by congestive heart failure (CHF). In CHF, extra fluid collects inside the lung tissues because of a weakened heart muscle. This extra fluid then leaks into the pleural space.  Pleural effusion may cause any of these symptoms:    Shortness of breath    Rapid breathing    Cough or hiccups    Sharp chest pain that hurts more with coughing or deep breathing  A small pleural effusion may cause no symptoms at all.  Treatment will be directed at the cause of the pleural effusion. If you are having a lot of trouble breathing, a thoracentesis procedure may be done to remove the fluid from the pleural space. This involves placing a needle or tube (catheter) through the chest wall into the pleural space. This usually gives relief right away. But the fluid may gradually return.  Home care  Follow these guidelines when caring for yourself at home:    Rest until you feel better. Exerting yourself may make your symptoms worse.    Your healthcare provider may have prescribed medicines to treat the underlying cause of the  pleural effusion. Take these exactly as directed.  Follow-up care  Follow up with your healthcare provider, or as advised.  When to seek medical advice  Call your healthcare provider right away if any of these occur:    Fever of 100.4 F (38 C) or higher, or as directed by your healthcare provider  Call 911  Call 911 if any of the following occur:    Shortness of breath gets worse    Chest pain gets worse    Coughing up blood    Weakness, dizziness, or fainting  Date Last Reviewed: 3/1/2018    [id number] [employer]. [address] [place] [address]. All rights reserved. This information is not intended as a substitute for professional medical care. Always follow your healthcare professional's instructions.

## 2020-07-10 NOTE — CONSULTS
Care Transition Initial Assessment - RN      Met with: Patient regarding discharge planning and assisting with ordering more supplies for Pleur-x drain as patient does not have any supplies at home.  Patient to discharge with  Homecare with start of care on 7/13 or 7/14 (MD aware and approved).   See below regarding equipment and ordering notes.  Referral made to [facility] for RN services and order form for pleur-x drain supplies received from [employer] and given to Tresa from Interventional Radiology to have Judy MANZO Fill out and fax back to [email or web address].   Writer gave patient drain kits to take home and reviewed discharge plan and follow up appointments with him.  Bedside RN to review final discharge instructions.    DATA   Active Problems:    Dyspnea       Cognitive Status: awake, alert and oriented.        Contact information and PCP information verified: Yes  Lives With: spouse          Insurance concerns: No Insurance issues identified  ASSESSMENT  Patient currently receives the following services:  None- Homecare services were discontinued on 6/25/2020.  Will re-order [facility] via  for Pleur-x drain maintenance and care.         Identified issues/concerns regarding health management: lacking supplies for Pleur-x.  Writer was able to send patient home with 2 kits and assisted in ordering more supplies thru [employer] ([phone])    PLAN  Financial costs for the patient include N/A .  Patient given options and choices for discharge yes .  Patient/family is agreeable to the plan?  Yes: home w/ Homecare  Patient anticipates discharging to home .      Patient anticipates needs for home equipment: No    Plan/Disposition: Home   Appointments: added to AVS  Care  (CTS) will continue to follow as needed.    Alesha Westbrook RN  Care Coordinator  [facility]  [phone]

## 2020-07-10 NOTE — PROVIDER NOTIFICATION
Neuro- A&OX4  Most Recent Vitals- Temp: 98.7  F (37.1  C) Temp src: Oral BP: 118/61   Heart Rate: 71 Resp: 18 SpO2: 96 % O2 Device: None (Room air)    Tele/Cardiac- SD   Resp- RA  Activity- UP with SBA   Pain- Denies   Drips- none   Drains/Tubes- left plurex drain tube covered with dressing, CDI.   Skin- Intact   GI/- WDL  On IV lasix with good urine out put.   Aggression Color- Green  Plan- Discharge instruction reviewed with pt and answered all questions. Pt expressed understanding of all discharge instructions and follow ups. Pt was discharged home with his wife at 1430.  Anitac-    Randy Keyes RN

## 2020-07-11 ENCOUNTER — APPOINTMENT (OUTPATIENT)
Dept: CT IMAGING | Facility: CLINIC | Age: 80
DRG: 178 | End: 2020-07-11
Attending: INTERNAL MEDICINE
Payer: COMMERCIAL

## 2020-07-11 ENCOUNTER — APPOINTMENT (OUTPATIENT)
Dept: INTERVENTIONAL RADIOLOGY/VASCULAR | Facility: CLINIC | Age: 80
DRG: 178 | End: 2020-07-11
Attending: INTERNAL MEDICINE
Payer: COMMERCIAL

## 2020-07-11 PROBLEM — R06.02 SOB (SHORTNESS OF BREATH): Status: ACTIVE | Noted: 2020-07-11

## 2020-07-11 LAB
APPEARANCE FLD: NORMAL
COLOR FLD: NORMAL
GLUCOSE BLDC GLUCOMTR-MCNC: 110 MG/DL (ref 70–99)
GLUCOSE BLDC GLUCOMTR-MCNC: 232 MG/DL (ref 70–99)
GLUCOSE BLDC GLUCOMTR-MCNC: 317 MG/DL (ref 70–99)
GLUCOSE BLDC GLUCOMTR-MCNC: 77 MG/DL (ref 70–99)
GLUCOSE FLD-MCNC: 7 MG/DL
GRAM STN SPEC: ABNORMAL
LDH FLD L TO P-CCNC: 318 U/L
LDH SERPL L TO P-CCNC: 222 U/L (ref 85–227)
MONOS+MACROS NFR FLD MANUAL: 1 %
NEUTS BAND NFR FLD MANUAL: 99 %
PH FLD: 7.5 PH
PROCALCITONIN SERPL-MCNC: 1.14 NG/ML
PROT FLD-MCNC: 3.3 G/DL
PROT SERPL-MCNC: 5.4 G/DL (ref 6.8–8.8)
SARS-COV-2 PCR COMMENT: NORMAL
SARS-COV-2 RNA SPEC QL NAA+PROBE: NEGATIVE
SARS-COV-2 RNA SPEC QL NAA+PROBE: NORMAL
SPECIMEN SOURCE FLD: NORMAL
SPECIMEN SOURCE: ABNORMAL
SPECIMEN SOURCE: NORMAL
SPECIMEN SOURCE: NORMAL
WBC # FLD AUTO: 7662 /UL

## 2020-07-11 PROCEDURE — 12000000 ZZH R&B MED SURG/OB

## 2020-07-11 PROCEDURE — 00000146 ZZHCL STATISTIC GLUCOSE BY METER IP

## 2020-07-11 PROCEDURE — 27211193 ZZ H WOUND GLUE CR1

## 2020-07-11 PROCEDURE — 32552 REMOVE LUNG CATHETER: CPT | Mod: LT

## 2020-07-11 PROCEDURE — 82945 GLUCOSE OTHER FLUID: CPT | Performed by: INTERNAL MEDICINE

## 2020-07-11 PROCEDURE — 0W9B30Z DRAINAGE OF LEFT PLEURAL CAVITY WITH DRAINAGE DEVICE, PERCUTANEOUS APPROACH: ICD-10-PCS | Performed by: RADIOLOGY

## 2020-07-11 PROCEDURE — 88305 TISSUE EXAM BY PATHOLOGIST: CPT | Mod: 26 | Performed by: PATHOLOGY

## 2020-07-11 PROCEDURE — 84145 PROCALCITONIN (PCT): CPT | Performed by: INTERNAL MEDICINE

## 2020-07-11 PROCEDURE — C1729 CATH, DRAINAGE: HCPCS

## 2020-07-11 PROCEDURE — 88305 TISSUE EXAM BY PATHOLOGIST: CPT | Performed by: INTERNAL MEDICINE

## 2020-07-11 PROCEDURE — 88305 TISSUE EXAM BY PATHOLOGIST: CPT | Mod: 26 | Performed by: INTERNAL MEDICINE

## 2020-07-11 PROCEDURE — 84157 ASSAY OF PROTEIN OTHER: CPT | Performed by: INTERNAL MEDICINE

## 2020-07-11 PROCEDURE — 25000128 H RX IP 250 OP 636: Performed by: EMERGENCY MEDICINE

## 2020-07-11 PROCEDURE — 25000128 H RX IP 250 OP 636: Performed by: INTERNAL MEDICINE

## 2020-07-11 PROCEDURE — 83986 ASSAY PH BODY FLUID NOS: CPT | Performed by: INTERNAL MEDICINE

## 2020-07-11 PROCEDURE — 89051 BODY FLUID CELL COUNT: CPT | Performed by: INTERNAL MEDICINE

## 2020-07-11 PROCEDURE — 71250 CT THORAX DX C-: CPT

## 2020-07-11 PROCEDURE — 88112 CYTOPATH CELL ENHANCE TECH: CPT | Performed by: INTERNAL MEDICINE

## 2020-07-11 PROCEDURE — 87077 CULTURE AEROBIC IDENTIFY: CPT | Performed by: INTERNAL MEDICINE

## 2020-07-11 PROCEDURE — 83615 LACTATE (LD) (LDH) ENZYME: CPT | Performed by: INTERNAL MEDICINE

## 2020-07-11 PROCEDURE — 25000128 H RX IP 250 OP 636: Performed by: RADIOLOGY

## 2020-07-11 PROCEDURE — 00000102 ZZHCL STATISTIC CYTO WRIGHT STAIN TC: Performed by: INTERNAL MEDICINE

## 2020-07-11 PROCEDURE — 87186 SC STD MICRODIL/AGAR DIL: CPT | Performed by: INTERNAL MEDICINE

## 2020-07-11 PROCEDURE — 25000132 ZZH RX MED GY IP 250 OP 250 PS 637: Performed by: INTERNAL MEDICINE

## 2020-07-11 PROCEDURE — 25000125 ZZHC RX 250: Performed by: RADIOLOGY

## 2020-07-11 PROCEDURE — 00000155 ZZHCL STATISTIC H-CELL BLOCK W/STAIN: Performed by: INTERNAL MEDICINE

## 2020-07-11 PROCEDURE — 88112 CYTOPATH CELL ENHANCE TECH: CPT | Mod: 26 | Performed by: PATHOLOGY

## 2020-07-11 PROCEDURE — 99207 ZZC CDG-HISTORY COMP: MEETS EXP. PROBLEM FOCUSED-DOWN CODED LACK OF ROS: CPT | Performed by: INTERNAL MEDICINE

## 2020-07-11 PROCEDURE — 25800030 ZZH RX IP 258 OP 636: Performed by: EMERGENCY MEDICINE

## 2020-07-11 PROCEDURE — 87205 SMEAR GRAM STAIN: CPT | Performed by: INTERNAL MEDICINE

## 2020-07-11 PROCEDURE — 87070 CULTURE OTHR SPECIMN AEROBIC: CPT | Performed by: INTERNAL MEDICINE

## 2020-07-11 PROCEDURE — 99221 1ST HOSP IP/OBS SF/LOW 40: CPT | Mod: AI | Performed by: INTERNAL MEDICINE

## 2020-07-11 PROCEDURE — 25000131 ZZH RX MED GY IP 250 OP 636 PS 637: Performed by: INTERNAL MEDICINE

## 2020-07-11 PROCEDURE — 27211039 ZZH NEEDLE CR2

## 2020-07-11 PROCEDURE — 32557 INSERT CATH PLEURA W/ IMAGE: CPT | Mod: LT

## 2020-07-11 PROCEDURE — 84155 ASSAY OF PROTEIN SERUM: CPT | Performed by: INTERNAL MEDICINE

## 2020-07-11 PROCEDURE — 0WPB30Z REMOVAL OF DRAINAGE DEVICE FROM LEFT PLEURAL CAVITY, PERCUTANEOUS APPROACH: ICD-10-PCS | Performed by: RADIOLOGY

## 2020-07-11 PROCEDURE — 27210808 ZZH SHEATH CR7

## 2020-07-11 PROCEDURE — C1769 GUIDE WIRE: HCPCS

## 2020-07-11 PROCEDURE — 88112 CYTOPATH CELL ENHANCE TECH: CPT | Mod: 26 | Performed by: INTERNAL MEDICINE

## 2020-07-11 RX ORDER — LOSARTAN POTASSIUM 25 MG/1
25 TABLET ORAL DAILY
Status: DISCONTINUED | OUTPATIENT
Start: 2020-07-11 | End: 2020-07-18 | Stop reason: HOSPADM

## 2020-07-11 RX ORDER — FLUMAZENIL 0.1 MG/ML
0.2 INJECTION, SOLUTION INTRAVENOUS
Status: DISCONTINUED | OUTPATIENT
Start: 2020-07-11 | End: 2020-07-17

## 2020-07-11 RX ORDER — ONDANSETRON 4 MG/1
4 TABLET, ORALLY DISINTEGRATING ORAL EVERY 6 HOURS PRN
Status: DISCONTINUED | OUTPATIENT
Start: 2020-07-11 | End: 2020-07-18 | Stop reason: HOSPADM

## 2020-07-11 RX ORDER — POTASSIUM CHLORIDE 750 MG/1
10 TABLET, EXTENDED RELEASE ORAL 2 TIMES DAILY
Status: DISCONTINUED | OUTPATIENT
Start: 2020-07-11 | End: 2020-07-18 | Stop reason: HOSPADM

## 2020-07-11 RX ORDER — NALOXONE HYDROCHLORIDE 0.4 MG/ML
.1-.4 INJECTION, SOLUTION INTRAMUSCULAR; INTRAVENOUS; SUBCUTANEOUS
Status: DISCONTINUED | OUTPATIENT
Start: 2020-07-11 | End: 2020-07-14

## 2020-07-11 RX ORDER — ACETAMINOPHEN 325 MG/1
650 TABLET ORAL EVERY 4 HOURS PRN
Status: DISCONTINUED | OUTPATIENT
Start: 2020-07-11 | End: 2020-07-18 | Stop reason: HOSPADM

## 2020-07-11 RX ORDER — ALBUTEROL SULFATE 90 UG/1
2 AEROSOL, METERED RESPIRATORY (INHALATION) EVERY 4 HOURS PRN
Status: DISCONTINUED | OUTPATIENT
Start: 2020-07-11 | End: 2020-07-18 | Stop reason: HOSPADM

## 2020-07-11 RX ORDER — LIDOCAINE 40 MG/G
CREAM TOPICAL
Status: DISCONTINUED | OUTPATIENT
Start: 2020-07-11 | End: 2020-07-18 | Stop reason: HOSPADM

## 2020-07-11 RX ORDER — VERAPAMIL HYDROCHLORIDE 240 MG/1
240 TABLET, FILM COATED, EXTENDED RELEASE ORAL AT BEDTIME
Status: DISCONTINUED | OUTPATIENT
Start: 2020-07-11 | End: 2020-07-18 | Stop reason: HOSPADM

## 2020-07-11 RX ORDER — NICOTINE POLACRILEX 4 MG
15-30 LOZENGE BUCCAL
Status: DISCONTINUED | OUTPATIENT
Start: 2020-07-11 | End: 2020-07-12

## 2020-07-11 RX ORDER — PIPERACILLIN SODIUM, TAZOBACTAM SODIUM 3; .375 G/15ML; G/15ML
3.38 INJECTION, POWDER, LYOPHILIZED, FOR SOLUTION INTRAVENOUS EVERY 6 HOURS
Status: DISCONTINUED | OUTPATIENT
Start: 2020-07-11 | End: 2020-07-14

## 2020-07-11 RX ORDER — FENTANYL CITRATE 50 UG/ML
25-50 INJECTION, SOLUTION INTRAMUSCULAR; INTRAVENOUS EVERY 5 MIN PRN
Status: DISCONTINUED | OUTPATIENT
Start: 2020-07-11 | End: 2020-07-17

## 2020-07-11 RX ORDER — FUROSEMIDE 40 MG
40 TABLET ORAL 2 TIMES DAILY
Status: DISCONTINUED | OUTPATIENT
Start: 2020-07-11 | End: 2020-07-18 | Stop reason: HOSPADM

## 2020-07-11 RX ORDER — DEXTROSE MONOHYDRATE 25 G/50ML
25-50 INJECTION, SOLUTION INTRAVENOUS
Status: DISCONTINUED | OUTPATIENT
Start: 2020-07-11 | End: 2020-07-12

## 2020-07-11 RX ORDER — NALOXONE HYDROCHLORIDE 0.4 MG/ML
.1-.4 INJECTION, SOLUTION INTRAMUSCULAR; INTRAVENOUS; SUBCUTANEOUS
Status: DISCONTINUED | OUTPATIENT
Start: 2020-07-11 | End: 2020-07-18 | Stop reason: HOSPADM

## 2020-07-11 RX ORDER — PRAVASTATIN SODIUM 20 MG
20 TABLET ORAL DAILY
Status: DISCONTINUED | OUTPATIENT
Start: 2020-07-11 | End: 2020-07-18 | Stop reason: HOSPADM

## 2020-07-11 RX ORDER — ONDANSETRON 2 MG/ML
4 INJECTION INTRAMUSCULAR; INTRAVENOUS EVERY 6 HOURS PRN
Status: DISCONTINUED | OUTPATIENT
Start: 2020-07-11 | End: 2020-07-18 | Stop reason: HOSPADM

## 2020-07-11 RX ADMIN — FUROSEMIDE 40 MG: 40 TABLET ORAL at 20:57

## 2020-07-11 RX ADMIN — LOSARTAN POTASSIUM 25 MG: 25 TABLET, FILM COATED ORAL at 09:01

## 2020-07-11 RX ADMIN — PIPERACILLIN SODIUM AND TAZOBACTAM SODIUM 3.38 G: 3; .375 INJECTION, POWDER, LYOPHILIZED, FOR SOLUTION INTRAVENOUS at 06:11

## 2020-07-11 RX ADMIN — POTASSIUM CHLORIDE 10 MEQ: 750 TABLET, EXTENDED RELEASE ORAL at 09:01

## 2020-07-11 RX ADMIN — FENTANYL CITRATE 50 MCG: 50 INJECTION, SOLUTION INTRAMUSCULAR; INTRAVENOUS at 14:36

## 2020-07-11 RX ADMIN — FUROSEMIDE 40 MG: 40 TABLET ORAL at 09:01

## 2020-07-11 RX ADMIN — PRAVASTATIN SODIUM 20 MG: 20 TABLET ORAL at 09:01

## 2020-07-11 RX ADMIN — INSULIN ASPART 4 UNITS: 100 INJECTION, SOLUTION INTRAVENOUS; SUBCUTANEOUS at 17:49

## 2020-07-11 RX ADMIN — PIPERACILLIN SODIUM AND TAZOBACTAM SODIUM 3.38 G: 3; .375 INJECTION, POWDER, LYOPHILIZED, FOR SOLUTION INTRAVENOUS at 23:36

## 2020-07-11 RX ADMIN — UMECLIDINIUM 1 PUFF: 62.5 AEROSOL, POWDER ORAL at 09:01

## 2020-07-11 RX ADMIN — VANCOMYCIN HYDROCHLORIDE 1500 MG: 5 INJECTION, POWDER, LYOPHILIZED, FOR SOLUTION INTRAVENOUS at 02:23

## 2020-07-11 RX ADMIN — PIPERACILLIN SODIUM AND TAZOBACTAM SODIUM 3.38 G: 3; .375 INJECTION, POWDER, LYOPHILIZED, FOR SOLUTION INTRAVENOUS at 17:43

## 2020-07-11 RX ADMIN — PIPERACILLIN SODIUM AND TAZOBACTAM SODIUM 3.38 G: 3; .375 INJECTION, POWDER, LYOPHILIZED, FOR SOLUTION INTRAVENOUS at 11:09

## 2020-07-11 RX ADMIN — VERAPAMIL HYDROCHLORIDE 240 MG: 240 TABLET, FILM COATED, EXTENDED RELEASE ORAL at 20:58

## 2020-07-11 RX ADMIN — POTASSIUM CHLORIDE 10 MEQ: 750 TABLET, EXTENDED RELEASE ORAL at 20:58

## 2020-07-11 RX ADMIN — LIDOCAINE HYDROCHLORIDE 20 ML: 10 INJECTION, SOLUTION INFILTRATION; PERINEURAL at 15:15

## 2020-07-11 ASSESSMENT — MIFFLIN-ST. JEOR: SCORE: 1392.11

## 2020-07-11 ASSESSMENT — ACTIVITIES OF DAILY LIVING (ADL)
ADLS_ACUITY_SCORE: 14
ADLS_ACUITY_SCORE: 15
ADLS_ACUITY_SCORE: 16

## 2020-07-11 NOTE — PROCEDURES
RADIOLOGY POST PROCEDURE NOTE WITH SEDATION  Patient name: Tc Garcia  MRN: [id number]  : 1940    Pre-procedure diagnosis: Recurrent left pleural effusion requiring placement of tunneled pleural drain now with infected tract and empyema  Post-procedure diagnosis: Same    Procedure Date/Time: 2020  3:18 PM    Procedure: New nontunneled left chest tube placement; removal of left tunneled pleural drainage catheter  Estimated blood loss: None  Sedation: Moderate sedation was employed. The patient was monitored by a nurse at all times during the procedure under my direct supervision.    Specimen(s) collected with description: 100cc of cloudy left pleural fluid    I determined this patient to be an appropriate candidate for the planned sedation and procedure and reassessed the patient IMMEDIATELY PRIOR to sedation and procedure.     The patient tolerated the procedure well with no immediate complications.    Significant findings:none    See imaging dictation for procedural details.    Provider name: Ashok Koroma MD  Assistant(s):None

## 2020-07-11 NOTE — ED PROVIDER NOTES
Visit Date:   07/10/2020      CHIEF COMPLAINT:  Weakness.      HISTORY OF PRESENT ILLNESS:  This is an 80-year-old male who was just discharged today after a 2 or 3 day stay in the hospital.  He has problems with left pleural effusions.  He has a PleurX in place.  He also had a drainage done with thoracentesis.  When he went home, he states he was still feeling weak, and when he tried to get up from a chair, he was so weak he fell to the ground.  He did not hit his head or hurt his neck, but just was too weak to get up.  He states he has been hospitalized and sent home many times, but he has never quite felt this weak before.  He denies any new chest pain, belly pain, diarrhea, urinary symptoms, cough, fevers.  He feels cold, but no shaking chills.  He did have a COVID swab done 2 or 3 days ago, which was negative.      PAST MEDICAL HISTORY:  Elevated lipids, recurrent pleural effusions, respiratory failure, pulmonary hypertension, atrial fibrillation, diabetes.      MEDICATIONS:  Also include Eliquis, Lasix, insulin, lovastatin, verapamil.      FAMILY AND SOCIAL HISTORY:  He is , does not smoke or drink.      REVIEW OF SYSTEMS:  As noted, all other systems negative.      PHYSICAL EXAMINATION:   GENERAL:  Reveals an elderly white male sitting, in no respiratory distress.     VITAL SIGNS:  Blood pressure 125/79, pulse 78, pulse oximetry 100% on supplemental oxygen, and initial temperature 100.9.   HEENT:  Pupils equal, reactive.  Extraocular movements intact.  Oropharynx clear.   NECK:  Neck veins flat.   CHEST:  Markedly diminished breath sounds on the left side.  There is a thoracentesis catheter on the left lower anterior chest wall.   HEART:  Regular, no murmurs appreciated.   ABDOMEN:  Soft without tenderness or masses.   MUSCULOSKELETAL:  No swelling.   SKIN:  No rash.   NEUROLOGIC:  Awake, alert, appropriate, fluent speech, no facial asymmetry.  Normal motor.  While supine, gait not tested.    PSYCHIATRIC:  No overt psychosis.      EMERGENCY DEPARTMENT COURSE:  IV had been established.  Labs were obtained.  Chest x-ray was obtained.  EKG was obtained.  EKG shows atrial fibrillation with some nonspecific ST-T changes.  This is not considerably changed from previous.      LABORATORY DATA:  Urine:  Small leukocyte esterase, but only 3 white cells.  Chemistries:  Glucose 125, creatinine 1.68 with a BUN of 27.  White count 28.7, hemoglobin 10.8, platelet count 496, left shift is present.  Lactate 1.4.  Blood cultures x 2 were obtained.  Chest x-ray showed reaccumulation of fluid in the left hemithorax.        COURSE:  As noted.  Cultures were obtained.  The patient received Tylenol, and he was started empirically on antibiotics for possible infection, sepsis, given Zosyn and vancomycin, and repeat COVID was obtained.  The patient is saturating well.  He is not in respiratory distress but will be brought in since he continues to be weak, his fluid has reaccumulated, and his white count is going up.      IMPRESSION:   1.  Sepsis.   2.  Left pleural effusion.         AMY GARCIA MD             D: 2020   T: 2020   MT: ESPERANZA      Name:     CEDRICK PHILLIPS   MRN:      [id number]        Account:      [id number]   :      1940           Visit Date:   07/10/2020      Document: [id number]

## 2020-07-11 NOTE — PLAN OF CARE
Pt arrived from IR @ 1530. CT in place with no air leak at -20 suction. Since arriving to floor the CT has had 380 out. Denies SOB states feeling much better. Regular diet, tolerating well. VSS, A/Ox4 on RA. Denies pain. Assist of 1 with use of IV pole to the bathroom. Dependent edema +2 to lower ext. Was released yesterday from hospital after thoracentesis and was found to be weak and SOB at home. PleurX site dressing CDI. Pulmonology and IR consulted. Continue to monitor.

## 2020-07-11 NOTE — ED NOTES
[facility]  ED Nurse Handoff Report    ED Chief complaint: Generalized Weakness and Fall      ED Diagnosis:   Final diagnoses:   Fever, unspecified fever cause   Pleural effusion   Renal insufficiency       Code Status: To be addressed by admitting provider.     Allergies:   Allergies   Allergen Reactions     No Known Drug Allergies        Patient Story: Pt was at home today when he attempted to get up, felt weak and fell down. Pt denies hitting his head. Pt was released from hospital earlier yesterday.   Focused Assessment:  Pt is awake, alert and orientated X 4. Weak gait.     Treatments and/or interventions provided: Blood cultures, labs, 500 ml bolus, UA    Patient's response to treatments and/or interventions: tolerated well.     To be done/followed up on inpatient unit:      Does this patient have any cognitive concerns?: Pt is awake,alert and orientated X4.     Activity level - Baseline/Home:  Independent  Activity Level - Current:   Stand with Assist    Patient's Preferred language: English   Needed?: No    Isolation: Covid precaution  Infection: Covid precaution  Bariatric?: No    Vital Signs:   Vitals:    07/10/20 2215 07/10/20 2230 07/10/20 2300 07/10/20 2330   BP:   (!) 127/90 116/76   Pulse:    75   Resp:       Temp:    98.3  F (36.8  C)   TempSrc:    Oral   SpO2: 93% 91% 98% 96%   Weight:       Height:           Cardiac Rhythm:     Was the PSS-3 completed:   Yes  What interventions are required if any?               Family Comments: No family at bedside. Pt called his wife on the phone.   OBS brochure/video discussed/provided to patient/family: Yes              Name of person given brochure if not patient:               Relationship to patient:     For the majority of the shift this patient's behavior was Green.   Behavioral interventions performed were None.    ED NURSE PHONE NUMBER: *[phone]

## 2020-07-11 NOTE — PROGRESS NOTES
"Followed up on patient my colleague admitted earlier this AM.  He was just recently discharged and returned.  Those records were also reviewed.  Mr. Garcia was feeling \"a little better\" this AM.  No increase SOB from admit overnight.  Exam stable.      Appreciate pulm consult.  Also asked thoracic surgery to follow  given issues.  Plan today is to obtain sample of fluid and swap pleurx for chest tube.  Pulmonology is reportedly contacting IR per the patients RN.  Patient was comfortable with the plan. He should be able to eat once procedures complete.  He was covid negative again (multiple recent negatives) so precautions removed.  He can move to the med/surg floor when bed available.  "

## 2020-07-11 NOTE — PROGRESS NOTES
RECEIVING UNIT ED HANDOFF REVIEW    ED Nurse Handoff Report was reviewed by: Jani Caballero RN on July 11, 2020 at 1:20 AM

## 2020-07-11 NOTE — CONSULTS
PULMONOLOGY CONSULTATION  Date of service: 7/11/2020    [facility]    _____________________________________________________    Tc Garcia  80 year old male  [id number]  [address] [facility] [id number]    Primary Care Provider:  Charlie Finch  Admission Date: 7/10/2020  Hospital Attending Physician:  Nemo att. providers found  ________________________________________    CHIEF COMPLAINT : I was asked to see this patient by Dr. Manuel for evaluation of pleurX   Informant: EHR and patient    HISTORY OF PRESENT ILLNESS     Tc Garcia is an 80-year-old  gentleman with a very complicated medical history including diabetes, hypertension, chronic anemia, recurrent left pleural effusion, status post PleurX catheter placement in 05/2020 with paroxysmal atrial fibrillation/flutter, heart failure with preserved ejection fraction, pulmonary hypertension, who was recently admitted from 07/08 through 07/20 with recurrent left pleural effusion.  The patient's PleurX catheter was adjusted by Interventional Radiology, and he was able to drain over 500 mL of fluid.  Repeat chest x-ray showed improvement of drainage with a small stable pneumothorax.  The patient underwent additional diuresis with improvement of his creatinine.  The patient was discharged, testing COVID negative PCR back home.  He was also seen by Cardiology.      The patient comes in today due to increased weakness and shortness of breath.  The patient was very weak today when he tried to get up off the chair, he fell to the ground without any injury to his head or neck.  He was too weak to get up.  He denies any cardiopulmonary complaints or diarrhea or fever, chills or cough.  He was brought to [facility] for further assessment.      In the Emergency Department, the patient was seen by Dr. Law Shearer.  The patient was noted to have a low-grade fever of 100.9.  We also note that the patient's white  count has been trending up even from his last admission from 15.1 at his prior admission up to 21,000 and then 28,000 on his admission here.  Hemoglobin stable.  Platelet counts are elevated, and stable.  He has a left shift as well.  Chemistry otherwise shows stable, slightly worsening creatinine of 1.6, up from 1.5.  Electrolytes were unremarkable.  Lactic acid 1.4.  He had another COVID test that came back negative.  Urinalysis had 3 white cells, 2 red cells.  He had blood cultures obtained.  A 2-view chest x-ray showed increased interval of chronic left pleural effusion that is large with near complete collapse of his left lung.  The PleurX pleural drainage catheter in stable position.  Right lung is clear, without any pneumothorax.  ECG showed atrial fibrillation with controlled ventricular rhythm.  The patient received vancomycin as well as Zosyn in the Emergency Department.  He did receive a couple fluid boluses and is being admitted for recurrent pleural effusion, fever, worsening white count.  The patient will be admitted as inpatient       CURRENT RESPIRATORY SYMPTOMS:  Sinus congestion/drainage/pain:  n  Cough: y  Sputum: n  Wheeze:  n  Dyspnea: y  Chest Discomfort: y  Paroxysmal nocturnal dyspnea/Orthopnea:  n  Snore: n  Witnessed Apnea: n  Daytime Fatigue: n  Fever/ Chills/ Sweats: n   Reflux: n  Dysphagia/Vomiting:n    HOME MEDICATIONS   Pulmonary meds: n  Home Oxygen: n  Medications Prior to Admission   Medication Sig Dispense Refill Last Dose     acetaminophen (TYLENOL) 325 MG tablet Take 2 tablets (650 mg) by mouth every 4 hours as needed for mild pain  0      albuterol (PROAIR HFA/PROVENTIL HFA/VENTOLIN HFA) 108 (90 Base) MCG/ACT inhaler Inhale 2 puffs into the lungs every 4 hours as needed for shortness of breath / dyspnea or wheezing Inhale 2 puffs every 4 to 6 hours as needed.        apixaban ANTICOAGULANT (ELIQUIS) 5 MG tablet Take 1/2 tablet (2.5mg) by mouth twice daily. You will have your  labs checked on 5/4/20 and your PCP will direct you when it is safe to increase your dose back to 1 tablet (5mg) twice daily.   7/10/2020 at x2     furosemide (LASIX) 40 MG tablet Take 1 tablet (40 mg) by mouth 2 times daily 180 tablet 0 7/10/2020 at Unknown time     insulin aspart (NOVOLOG PEN) 100 UNIT/ML pen Inject 8 units subcutaneous with breakfast, 8 units subcutaneous with lunch and 4 units with supper. 3 mL 0 7/10/2020 at Unknown time     insulin glargine (LANTUS PEN) 100 UNIT/ML pen Inject 14 Units Subcutaneous every morning   7/10/2020 at Unknown time     losartan (COZAAR) 25 MG tablet Take 25 mg by mouth daily        LOVASTATIN 20 MG PO TABS 1 TABLET DAILY AT DINNER 90 Tab 0      nystatin (MYCOSTATIN) 721432 UNIT/GM external cream Apply topically 2 times daily as needed         potassium chloride ER (KLOR-CON M) 10 MEQ CR tablet Take 1 tablet (10 mEq) by mouth 2 times daily 60 tablet 0      tiotropium (SPIRIVA) 18 MCG inhaled capsule Inhale 18 mcg into the lungs daily        verapamil ER (VERELAN) 240 MG 24 hr capsule Take 1 capsule (240 mg) by mouth At Bedtime        glucagon 1 MG kit 1 mg as needed for low blood sugar          PAST MEDICAL HISTORY      Past Medical History:   Diagnosis Date     Anemia      Anemia of chronic disease 5/14/2020     CKD (chronic kidney disease) stage 3, GFR 30-59 ml/min (H)      CRF (chronic renal failure), stage 3  5/14/2020     Fall 11/2019    suffered multiple left rib fractures, C3 and T2 fractures     Mixed hyperlipidemia 7/7/2004     Paroxysmal atrial fibrillation (H)      Personal history of colonic polyps 1972    gets colonoscopy every five years, due in 2006     Pleural effusion on left 11/2019    after multiple rib fractures     Pulmonary hypertension (H) 5/10/2020    Added automatically from request for surgery [id number]     Recurrent Left Pleural effusion -- S/P Pleurex Cath 5/12/20 12/30/2019     Rosacea 1989     Type II or unspecified type diabetes mellitus  without mention of complication, not stated as uncontrolled      Unspecified essential hypertension      Past Surgical History:   Procedure Laterality Date     ANESTHESIA CARDIOVERSION N/A 2/3/2020    Procedure: ANESTHESIA, FOR CARDIOVERSION;  Surgeon: GENERIC ANESTHESIA PROVIDER;  Location:  OR     CV RIGHT HEART CATH N/A 2020    Procedure: Right Heart Cath;  Surgeon: Senthil Silva MD;  Location:  HEART CARDIAC CATH LAB     HC REMOVE TONSILS/ADENOIDS,<13 Y/O      T & A <11y.o.     IR CHEST TUBE DRAIN TUNNELED LEFT  2020       ALLERGIES     Allergies   Allergen Reactions     No Known Drug Allergies        SOCIAL / SUBSTANCE HISTORY     Social History     Socioeconomic History     Marital status:      Spouse name: Lianna     Number of children: 4     Years of education: 16     Highest education level: Not on file   Occupational History     Occupation: [employer]     Employer: [employer]    Social Needs     Financial resource strain: Not on file     Food insecurity     Worry: Not on file     Inability: Not on file     Transportation needs     Medical: Not on file     Non-medical: Not on file   Tobacco Use     Smoking status: Former Smoker     Packs/day: 0.20     Years: 40.00     Pack years: 8.00     Types: Cigarettes     Start date:      Last attempt to quit: 2008     Years since quittin.5     Smokeless tobacco: Never Used     Tobacco comment: occasional   Substance and Sexual Activity     Alcohol use: Not Currently     Alcohol/week: 35.0 standard drinks     Drug use: Not Currently     Sexual activity: Not on file   Lifestyle     Physical activity     Days per week: Not on file     Minutes per session: Not on file     Stress: Not on file   Relationships     Social connections     Talks on phone: Not on file     Gets together: Not on file     Attends Congregational service: Not on file     Active member of club or organization: Not on file     Attends meetings  "of clubs or organizations: Not on file     Relationship status: Not on file     Intimate partner violence     Fear of current or ex partner: Not on file     Emotionally abused: Not on file     Physically abused: Not on file     Forced sexual activity: Not on file   Other Topics Concern     Parent/sibling w/ CABG, MI or angioplasty before 65F 55M? Not Asked   Social History Narrative     Not on file       FAMILY HISTORY     Family History   Problem Relation Age of Onset     Family History Negative Mother          age 71     Family History Negative Father          age 70     Diabetes Brother         alive age 77     Diabetes Brother         alive age 76     Family History Negative Brother              Family History Negative Brother              Diabetes Sister         alive age74     Family History Negative Sister          age 86     Heart Disease Sister              Family History Negative Sister              Family History Negative Sister              Diabetes Sister      Diabetes Sister      Diabetes Brother      Diabetes Brother        REVIEW OF SYSTEMS   A comprehensive review of systems was negative except for items noted in HPI/Subjective.    PHYSICAL EXAMINATION   Temp (24hrs), Av.8  F (36.6  C), Min:95.9  F (35.5  C), Max:100.9  F (38.3  C)    Vital signs:  Temp: 96.8  F (36  C) Temp src: Oral BP: 121/71 Pulse: 86   Resp: 18 SpO2: 96 % O2 Device: None (Room air)   Height: 175.3 cm (5' 9\") Weight: 69.7 kg (153 lb 9.6 oz)  Estimated body mass index is 22.68 kg/m  as calculated from the following:    Height as of this encounter: 1.753 m (5' 9\").    Weight as of this encounter: 69.7 kg (153 lb 9.6 oz).        No intake/output data recorded.    CONSTITUTIONAL/GENERAL APPEARANCE: Alert oriented male. No Apparent Distress.  PSYCHIATRIC: Pleasant and appropriate mood and affect. Oriented x 3.  EARS, NOSE,THROAT,MOUTH: External ears and nose overall " normal. normal oral mucosa.   NECK: Neck appearance normal. No neck masses and the thyroid not enlarged.   RESPIRATORY: Non-labored effort. Diminished on left with infected track of tunneled pleural catheter including insertion site  CARDIOVASCULAR: S1, S2, regular rate and rhythm, no murmur. Extremities with no edema.  ABDOMEN: round, soft, non-tender, non-distended, no palpable masses. No presence of hernia.  LYMPHATIC: no cervical or axillary lymphadenopathy.  SKIN: Skin color was normal. No clubbing or cyanosis. No palpable skin abnormalities.    LABORATORY ASSESSMENT    Arterial Blood GasNo lab results found in last 7 days.  CBC  Recent Labs   Lab 07/10/20  2113 07/09/20  0549 07/08/20  1223   WBC 28.7* 21.0* 15.1*   RBC 3.88* 3.72* 3.91*   HGB 10.8* 10.2* 10.7*   HCT 33.6* 32.3* 34.4*   MCV 87 87 88   MCH 27.8 27.4 27.4   MCHC 32.1 31.6 31.1*   RDW 17.2* 17.7* 17.8*   * 444 506*     BMP  Recent Labs   Lab 07/10/20  2113 07/10/20  0555 07/09/20  0549 07/08/20  0944    134 134 136   POTASSIUM 4.2 3.3* 3.5 4.4   CHLORIDE 100 98 100 99   LLOYD 9.1 8.2* 8.5 9.4   CO2 31 30 29 31*   BUN 27 24 20 22   CR 1.68* 1.51* 1.53* 2.20*   * 186* 306* 373*     INRNo lab results found in last 7 days.   BNP  Recent Labs   Lab 07/08/20  0944   NTBNP 2,210*     VENOUS BLOOD GASESNo lab results found in last 7 days.      Additional labs and/or comments:     IMAGING      CT chest  IMPRESSION: Large loculated left pleural effusion with pleural  catheter in place. Question whether the catheter is occluded or  excluded from the fluid due to loculation.      PFT & OTHER TESTING       ASSESSMENT / PLAN      Pulmonary diagnoses:  Abnl CT/CXR R91.8  CHF I50.9  Empyema J86.9    Additional COVID-19 diagnoses:  Concern of possible exposure to COVID-19, Now RULED OUT Z03.818      ASSESSMENT : Tc Garcia has a complicated medical history including acute on chronic heart failure and pulmonary hypertension as well as recurrent  left pleural effusion and he has paroxysmal atrial fibrillation for which he is on Coumadin and diabetes as well as chronic obstructive pulmonary disease, amongst other diagnoses, who presents with profound weakness, worsening elevation of her white count and recurrent left pleural effusion with near collapse, being admitted for further treatment.       PLAN:     Reviewed CT chest, left pleural space is infected with a likely empyema/parapneumonic effusion    Suggest thoracentesis for pleural studies (not from pleurX to assure clean cultures), order and follow up on cultures    Suggest removing pleurX catheter and replacing it with a chest tube by IR, prefer not a pigtail as it will likely get clogged due to parapneumonic effusion that is present    Continue board spectrum antibiotics    Suggest thoracic surgery to get involved as decortication may be needed if unable to clear pleural space with above maneuvers.      Once pleurX is removed, hold off on any future considerations for tunneled pleural catheters      Dinora Gonzalez M.D.  [facility]  Office: [phone]  Pager: [phone]

## 2020-07-11 NOTE — PROGRESS NOTES
THORACIC SURGERY    Consultation received  Reviewed  Well known from prior hospitalization    Agree with Dr. Gonzalez    Will follow    Not a good candidate for any surgical intervention    DONTRELL CARRILLO MD [facility] ONCOLOGY THORACIC SURGERY  CELL:  [phone]  OFFICE: [phone]

## 2020-07-11 NOTE — IR NOTE
Interventional Radiology Intra-procedural Nursing Note    Patient Name: Tc Garcia  Medical Record Number: [id number]  Today's Date: July 11, 2020    Start Time: 1445  End of procedure time: 1515  Procedure: left pleurx catheter removal, left chest tube inserted, non-tunneled, non-pigtail  Report given to: AUGUSTIN Smith, Station [address]  Time pt departs:  1530  : n/a     Other Notes:   Fentanyl 50mcg    Patient tolerated procedure well. VSS. Patient alert, respirations regular and unlabored, no c/o pain at this time.   Left pleurx catheter removed- track site- skin reddened.   New left anterior chest tube site c/d/i, attached to Pleuravac water seal, to be -20 continuous low wall sxn on floor, per Dr. Koroma.    Patient transferred to Station 33 in stable condition accompanied by myself.        Dorie Adam RN

## 2020-07-11 NOTE — ED NOTES
Bed: ED27  Expected date: 7/10/20  Expected time: 8:45 PM  Means of arrival: Ambulance  Comments:  Sai 518 80M weakness/falls/recent dsch

## 2020-07-11 NOTE — PROGRESS NOTES
MD Notification    Notified Person: MD    Notified Person Name: Stella Donaldson.     Notification Date/Time: 7/11/20 @ 1215hrs    Notification Interaction: Pager    Purpose of Notification: IR on-call only, MD to contact IR if Thoracenteses is STAT.     Orders Received:    Comments:

## 2020-07-11 NOTE — PROGRESS NOTES
Pt transferring to station 33 after IR procedure.Report given to receiving Nurse. Pt transferring with all his belongings. Eliquis and insulins held per MD's order this morning.

## 2020-07-11 NOTE — PLAN OF CARE
Pt arrived from ED @1:30. Was released yesterday from hospital after thoracentesis and was found to be weak and SOB at home.Pt. A&Ox4. VSS on Ra, denies pain. Denies CP, SOB, N/V. Up A+1-2. PleurX site WDL. Dependent edema +2 in ankles and feet. Chest xray in results. Covid test back negative. Plan today for Cat scan. Pulmonology and IR consulted. Discharge pending. Continue to monitor.

## 2020-07-11 NOTE — PHARMACY-ADMISSION MEDICATION HISTORY
Pharmacy Medication History  Admission medication history interview status for the 7/10/2020  admission is complete. See EPIC admission navigator for prior to admission medications     Medication history sources: Patient and MAR (hospital MAR)  Medication history source reliability: Moderate  Adherence assessment: Moderate    Significant changes made to the medication list:  -Pt discharged morning of 7/10/19. Med history per discharge note, previous med red and hospital MAR (before discharge). Called pt if he had evening Eliquis.       Additional medication history information:       Medication reconciliation completed by provider prior to medication history? No    Time spent in this activity: 10 min      Prior to Admission medications    Medication Sig Last Dose Taking? Auth Provider   acetaminophen (TYLENOL) 325 MG tablet Take 2 tablets (650 mg) by mouth every 4 hours as needed for mild pain  Yes Shaun Stack MD   albuterol (PROAIR HFA/PROVENTIL HFA/VENTOLIN HFA) 108 (90 Base) MCG/ACT inhaler Inhale 2 puffs into the lungs every 4 hours as needed for shortness of breath / dyspnea or wheezing Inhale 2 puffs every 4 to 6 hours as needed.  Yes Unknown, Entered By History   apixaban ANTICOAGULANT (ELIQUIS) 5 MG tablet Take 1/2 tablet (2.5mg) by mouth twice daily. You will have your labs checked on 5/4/20 and your PCP will direct you when it is safe to increase your dose back to 1 tablet (5mg) twice daily. 7/10/2020 at x2 Yes Kelley Fernandez DO   furosemide (LASIX) 40 MG tablet Take 1 tablet (40 mg) by mouth 2 times daily 7/10/2020 at Unknown time Yes Katlin Fontanez PA   insulin aspart (NOVOLOG PEN) 100 UNIT/ML pen Inject 8 units subcutaneous with breakfast, 8 units subcutaneous with lunch and 4 units with supper. 7/10/2020 at Unknown time Yes Anny Beach MD   insulin glargine (LANTUS PEN) 100 UNIT/ML pen Inject 14 Units Subcutaneous every morning 7/10/2020 at Unknown time Yes Unknown,  Entered By History   losartan (COZAAR) 25 MG tablet Take 25 mg by mouth daily  Yes Unknown, Entered By History   LOVASTATIN 20 MG PO TABS 1 TABLET DAILY AT DINNER  Yes Tc Palacios MD   nystatin (MYCOSTATIN) 654218 UNIT/GM external cream Apply topically 2 times daily as needed   Yes Unknown, Entered By History   potassium chloride ER (KLOR-CON M) 10 MEQ CR tablet Take 1 tablet (10 mEq) by mouth 2 times daily  Yes Anny Beach MD   tiotropium (SPIRIVA) 18 MCG inhaled capsule Inhale 18 mcg into the lungs daily  Yes Unknown, Entered By History   verapamil ER (VERELAN) 240 MG 24 hr capsule Take 1 capsule (240 mg) by mouth At Bedtime  Yes Quinton Camejo MD   glucagon 1 MG kit 1 mg as needed for low blood sugar   Unknown, Entered By History

## 2020-07-11 NOTE — ED TRIAGE NOTES
Pt was brought in by EMS from home due weakness and fall.  Pt stated he got up from sitting position and lost balanced, fell down, denies hitting his head. Wife was witnessed the fall. EMS placed a C-collar on patient as per their protocol. Pt is on Eliquis.     Pt was released from hospital this morning for left lung fluid drainage.     Upon arrival, pt is awake,alert and orientated. Pt denies any neck or head pain.

## 2020-07-11 NOTE — UTILIZATION REVIEW
"  Admission Status; Secondary Review Determination     Admission Date: 7/10/2020  9:01 PM      Under the authority of the Utilization Management Committee, the utilization review process indicated a secondary review on the above patient.  The review outcome is based on review of the medical records, discussions with staff, and applying clinical experience noted on the date of the review.        (x)      Inpatient Status Appropriate - This patient's medical care is consistent with medical management for inpatient care and reasonable inpatient medical practice.      () Observation Status Appropriate - This patient does not meet hospital inpatient criteria and is placed in observation status. If this patient's primary payer is Medicare and was admitted as an inpatient, Condition Code 44 should be used and patient status changed to \"observation\".   () Admission Status NOT Appropriate - This patient's medical care is not consistent with medical management for Inpatient or Observation Status.          RATIONALE FOR DETERMINATION   Tc Garcia is an 80-year-old  gentleman with a very complicated medical history including diabetes, hypertension, chronic anemia, recurrent left pleural effusion, status post PleurX catheter placement in 05/2020 with paroxysmal atrial fibrillation/flutter, heart failure with preserved ejection fraction, pulmonary hypertension.  He presented to the hospital with weakness and shortness of breath.  He was noted to be febrile.  Also had a very significant leukocytosis with white blood cell count elevated to 28.7.  Imaging has shown a large left pleural effusion.  He has been seen in consultation by pulmonology.  There is concerned that he has an empyema.  He initially required IV antibiotics with Zosyn and vancomycin.  He does require continued IV Zosyn at this time.  He will need a thoracentesis to treat pleural effusion and help direct antibiotic therapy.  He is expected to require a " prolonged hospital stay.  Due to this patient's advanced age, complex past medical history, empyema requiring IV Zosyn, need for thoracentesis, and expectation of a prolonged hospital stay, it is appropriate to have him admitted to the hospital as an inpatient.      The severity of illness, intensity of service provided, expected LOS and risk for adverse outcome make the care complex, high risk and appropriate for hospital admission.        The information on this document is developed by the utilization review team in order for the business office to ensure compliance.  This only denotes the appropriateness of proper admission status and does not reflect the quality of care rendered.         The definitions of Inpatient Status and Observation Status used in making the determination above are those provided in the CMS Coverage Manual, Chapter 1 and Chapter 6, section 70.4.      Sincerely,     Kings Peterson D.O.  Utilization Review/ Case Management  [facility].

## 2020-07-11 NOTE — H&P
Admitted:     07/10/2020      PRIMARY CARE PHYSICIAN:  Charlie Finch MD      CHIEF COMPLAINT:  Weakness, shortness of breath.      HISTORY OF PRESENT ILLNESS:  Tc Garcia is an 80-year-old  gentleman with a very complicated medical history including diabetes, hypertension, chronic anemia, recurrent left pleural effusion, status post PleurX catheter placement in 05/2020 with paroxysmal atrial fibrillation/flutter, heart failure with preserved ejection fraction, pulmonary hypertension, who was recently admitted from 07/08 through 07/20 with recurrent left pleural effusion.  The patient's PleurX catheter was adjusted by Interventional Radiology, and he was able to drain over 500 mL of fluid.  Repeat chest x-ray showed improvement of drainage with a small stable pneumothorax.  The patient underwent additional diuresis with improvement of his creatinine.  The patient was discharged, testing COVID negative PCR back home.  He was also seen by Cardiology.      The patient comes in today due to increased weakness and shortness of breath.  The patient was very weak today when he tried to get up off the chair, he fell to the ground without any injury to his head or neck.  He was too weak to get up.  He denies any cardiopulmonary complaints or diarrhea or fever, chills or cough.  He was brought to [facility] for further assessment.      In the Emergency Department, the patient was seen by Dr. Law Shearer.  The patient was noted to have a low-grade fever of 100.9.  We also note that the patient's white count has been trending up even from his last admission from 15.1 at his prior admission up to 21,000 and then 28,000 on his admission here.  Hemoglobin stable.  Platelet counts are elevated, and stable.  He has a left shift as well.  Chemistry otherwise shows stable, slightly worsening creatinine of 1.6, up from 1.5.  Electrolytes were unremarkable.  Lactic acid 1.4.  He had another COVID test that  came back negative.  Urinalysis had 3 white cells, 2 red cells.  He had blood cultures obtained.  A 2-view chest x-ray showed increased interval of chronic left pleural effusion that is large with near complete collapse of his left lung.  The PleurX pleural drainage catheter in stable position.  Right lung is clear, without any pneumothorax.  ECG showed atrial fibrillation with controlled ventricular rhythm.  The patient received vancomycin as well as Zosyn in the Emergency Department.  He did receive a couple fluid boluses and is being admitted for recurrent pleural effusion, fever, worsening white count.  The patient will be admitted as inpatient      PAST MEDICAL HISTORY:   1.  Anemia of chronic disease.   2.  Chronic kidney disease, stage 3.   3.  History of falls with multiple rib fractures.   4.  Hyperlipidemia.   5.  History of paroxysmal atrial fibrillation.   6.  History of colonic polyps.   7.  History of recurrent left-sided pleural effusions with a PleurX catheter placement.   8.  Pulmonary hypertension.   9.  Rosacea.   10.  Type 2 diabetes.   11.  Hypertension.      PAST SURGICAL HISTORY:  None.      ALLERGIES:  NO KNOWN DRUG ALLERGIES.      SOCIAL HISTORY:  Former smoker.  The patient quit tobacco in 2008.  He has 8-pack-year smoking history, currently no alcohol.  He is full code.      FAMILY HISTORY:  Reviewed and negative.      CURRENT MEDICATIONS:   1.  Tylenol.   2.  Albuterol.   3.  Apixaban.   4.  Lasix.   5.  NovoLog mealtime.   6.  Lantus   7.  Cozaar.   8.  Lovastatin.   9.  Nystatin cream.   10.  Potassium chloride.   11.  Spiriva.   12.  Verapamil.      REVIEW OF SYSTEMS:  Ten points reviewed and as dictated in history of present illness.      PHYSICAL EXAMINATION:   VITAL SIGNS:  Temperature 100.9, heart rate 79, respiration 18, blood pressure 112/75, sats are 91% on room air.   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   HEENT:  Head is atraumatic.  Pupils equal.  Mucous membranes are dry.   NECK:  JVP is elevated.   PULMONARY:  Has diminished breath sounds throughout his left side of the lung, the right lung is clear.   CARDIOVASCULAR:  S1, S2 irregularly irregular.   ABDOMEN:  Soft, nontender, normoactive bowel sounds.   CHEST:  Shows a PleurX catheter in the left lower rib cage.   MUSCULOSKELETAL:  No edema.   NEUROLOGIC:  He is able to move all 4 extremities.  Cranial nerves grossly intact.   DERMATOLOGIC:  Skin is Warm, dry, well perfused.   PSYCHIATRIC:  Mood and affect stable.      LABORATORY DATA:  As dictated in history of present illness.      ASSESSMENT:  Tc Garcia has a complicated medical history including acute on chronic heart failure and pulmonary hypertension as well as recurrent left pleural effusion and he has paroxysmal atrial fibrillation for which he is on Coumadin and diabetes as well as chronic obstructive pulmonary disease, amongst other diagnoses, who presents with profound weakness, worsening elevation of her white count and recurrent left pleural effusion with near collapse, being admitted for further treatment.      PLAN:   1.  Weakness with fever, elevated white count.  The patient has been cultured with blood cultures.  The patient has had recent healthcare exposure as well.  The patient has been started on vancomycin and Zosyn.  Blood cultures were obtained.  I have ordered a chest CT to help better define his pulmonary anatomy without any contrast.   2.  Recurrent left pleural effusion with near collapse of his left lung.  I wonder if the patient may have had mucus plugging.  We will await CAT scan.  We will obtain a pulmonary consultation.  The patient sees Dr. Philip Aguilera as his pulmonologist.  We will also get Interventional Radiology to reassess the PleurX catheter to ensure that it is draining properly.   3.  Rule out COVID.  The patient's COVID PCR has come back negative, patient could  be transferred to a general medical floor.   4.  History of diastolic heart failure.  The patient did receive IV fluids due to his weakness.  He was not hypotensive.  The patient was seen by Cardiology on his most recent admission.  He was diuresed close to 6 pounds.  He had positive vasodilator study in the past.  The patient will be resumed with respect to his Lasix orally 40 mg twice a day.   5.  Hypertension.  We will continue the patient on his Cozaar and verapamil.   6.  History of atrial fibrillation.  The patient is rate controlled.  We will continue the patient on his apixaban and verapamil.   7.  Chronic obstructive pulmonary disease.  Currently, no evidence of exacerbation.  We will continue the patient on his inhalers and Spiriva.   8.  History of diabetes.  The patient will be continued on NovoLog with 4 units 3 times a day with meals as well as Lantus 14 units, and moderate carbohydrate diet and sliding scale insulin.   9.  Hyperlipidemia.  We will continue the patient on lovastatin.   10.  Deep venous thrombosis prophylaxis.  The patient is already anticoagulated with Eliquis.      CODE STATUS:  Full.         RAMÓN ROB MD             D: 2020   T: 2020   MT: GH      Name:     CEDRICK PHILLIPS   MRN:      [id number]        Account:      [id number]   :      1940        Admitted:     07/10/2020                   Document: [id number]       cc: Charlie Finch MD

## 2020-07-12 ENCOUNTER — APPOINTMENT (OUTPATIENT)
Dept: PHYSICAL THERAPY | Facility: CLINIC | Age: 80
DRG: 178 | End: 2020-07-12
Payer: COMMERCIAL

## 2020-07-12 ENCOUNTER — APPOINTMENT (OUTPATIENT)
Dept: GENERAL RADIOLOGY | Facility: CLINIC | Age: 80
DRG: 178 | End: 2020-07-12
Attending: INTERNAL MEDICINE
Payer: COMMERCIAL

## 2020-07-12 LAB
ANION GAP SERPL CALCULATED.3IONS-SCNC: 12 MMOL/L (ref 3–14)
BUN SERPL-MCNC: 36 MG/DL (ref 7–30)
CALCIUM SERPL-MCNC: 8.8 MG/DL (ref 8.5–10.1)
CHLORIDE SERPL-SCNC: 99 MMOL/L (ref 94–109)
CO2 SERPL-SCNC: 22 MMOL/L (ref 20–32)
CREAT SERPL-MCNC: 1.64 MG/DL (ref 0.66–1.25)
ERYTHROCYTE [DISTWIDTH] IN BLOOD BY AUTOMATED COUNT: 17.1 % (ref 10–15)
GFR SERPL CREATININE-BSD FRML MDRD: 39 ML/MIN/{1.73_M2}
GLUCOSE BLDC GLUCOMTR-MCNC: 106 MG/DL (ref 70–99)
GLUCOSE BLDC GLUCOMTR-MCNC: 343 MG/DL (ref 70–99)
GLUCOSE BLDC GLUCOMTR-MCNC: 362 MG/DL (ref 70–99)
GLUCOSE BLDC GLUCOMTR-MCNC: 369 MG/DL (ref 70–99)
GLUCOSE BLDC GLUCOMTR-MCNC: 375 MG/DL (ref 70–99)
GLUCOSE BLDC GLUCOMTR-MCNC: 401 MG/DL (ref 70–99)
GLUCOSE BLDC GLUCOMTR-MCNC: 438 MG/DL (ref 70–99)
GLUCOSE SERPL-MCNC: 409 MG/DL (ref 70–99)
HCT VFR BLD AUTO: 35.2 % (ref 40–53)
HGB BLD-MCNC: 10.9 G/DL (ref 13.3–17.7)
MCH RBC QN AUTO: 27.3 PG (ref 26.5–33)
MCHC RBC AUTO-ENTMCNC: 31 G/DL (ref 31.5–36.5)
MCV RBC AUTO: 88 FL (ref 78–100)
PLATELET # BLD AUTO: 563 10E9/L (ref 150–450)
POTASSIUM SERPL-SCNC: 4.4 MMOL/L (ref 3.4–5.3)
RBC # BLD AUTO: 3.99 10E12/L (ref 4.4–5.9)
SODIUM SERPL-SCNC: 133 MMOL/L (ref 133–144)
WBC # BLD AUTO: 17.5 10E9/L (ref 4–11)

## 2020-07-12 PROCEDURE — 40000986 XR CHEST PORT 1 VW

## 2020-07-12 PROCEDURE — 97161 PT EVAL LOW COMPLEX 20 MIN: CPT | Mod: GP

## 2020-07-12 PROCEDURE — 25800030 ZZH RX IP 258 OP 636: Performed by: INTERNAL MEDICINE

## 2020-07-12 PROCEDURE — 12000000 ZZH R&B MED SURG/OB

## 2020-07-12 PROCEDURE — 25000131 ZZH RX MED GY IP 250 OP 636 PS 637: Performed by: INTERNAL MEDICINE

## 2020-07-12 PROCEDURE — 25000128 H RX IP 250 OP 636: Performed by: INTERNAL MEDICINE

## 2020-07-12 PROCEDURE — 36415 COLL VENOUS BLD VENIPUNCTURE: CPT | Performed by: INTERNAL MEDICINE

## 2020-07-12 PROCEDURE — 99233 SBSQ HOSP IP/OBS HIGH 50: CPT | Performed by: INTERNAL MEDICINE

## 2020-07-12 PROCEDURE — 85027 COMPLETE CBC AUTOMATED: CPT | Performed by: INTERNAL MEDICINE

## 2020-07-12 PROCEDURE — 97116 GAIT TRAINING THERAPY: CPT | Mod: GP

## 2020-07-12 PROCEDURE — 25000132 ZZH RX MED GY IP 250 OP 250 PS 637: Performed by: INTERNAL MEDICINE

## 2020-07-12 PROCEDURE — 00000146 ZZHCL STATISTIC GLUCOSE BY METER IP

## 2020-07-12 PROCEDURE — 97530 THERAPEUTIC ACTIVITIES: CPT | Mod: GP

## 2020-07-12 PROCEDURE — 80048 BASIC METABOLIC PNL TOTAL CA: CPT | Performed by: INTERNAL MEDICINE

## 2020-07-12 RX ORDER — NICOTINE POLACRILEX 4 MG
15-30 LOZENGE BUCCAL
Status: DISCONTINUED | OUTPATIENT
Start: 2020-07-12 | End: 2020-07-15

## 2020-07-12 RX ORDER — DEXTROSE MONOHYDRATE 25 G/50ML
25-50 INJECTION, SOLUTION INTRAVENOUS
Status: DISCONTINUED | OUTPATIENT
Start: 2020-07-12 | End: 2020-07-15

## 2020-07-12 RX ADMIN — INSULIN ASPART 9 UNITS: 100 INJECTION, SOLUTION INTRAVENOUS; SUBCUTANEOUS at 18:04

## 2020-07-12 RX ADMIN — INSULIN GLARGINE 14 UNITS: 100 INJECTION, SOLUTION SUBCUTANEOUS at 08:18

## 2020-07-12 RX ADMIN — PIPERACILLIN SODIUM AND TAZOBACTAM SODIUM 3.38 G: 3; .375 INJECTION, POWDER, LYOPHILIZED, FOR SOLUTION INTRAVENOUS at 13:44

## 2020-07-12 RX ADMIN — PRAVASTATIN SODIUM 20 MG: 20 TABLET ORAL at 08:18

## 2020-07-12 RX ADMIN — VERAPAMIL HYDROCHLORIDE 240 MG: 240 TABLET, FILM COATED, EXTENDED RELEASE ORAL at 21:31

## 2020-07-12 RX ADMIN — PIPERACILLIN SODIUM AND TAZOBACTAM SODIUM 3.38 G: 3; .375 INJECTION, POWDER, LYOPHILIZED, FOR SOLUTION INTRAVENOUS at 05:58

## 2020-07-12 RX ADMIN — FUROSEMIDE 40 MG: 40 TABLET ORAL at 08:18

## 2020-07-12 RX ADMIN — POTASSIUM CHLORIDE 10 MEQ: 750 TABLET, EXTENDED RELEASE ORAL at 21:31

## 2020-07-12 RX ADMIN — INSULIN ASPART 4 UNITS: 100 INJECTION, SOLUTION INTRAVENOUS; SUBCUTANEOUS at 13:40

## 2020-07-12 RX ADMIN — VANCOMYCIN HYDROCHLORIDE 1250 MG: 5 INJECTION, POWDER, LYOPHILIZED, FOR SOLUTION INTRAVENOUS at 16:15

## 2020-07-12 RX ADMIN — PIPERACILLIN SODIUM AND TAZOBACTAM SODIUM 3.38 G: 3; .375 INJECTION, POWDER, LYOPHILIZED, FOR SOLUTION INTRAVENOUS at 18:16

## 2020-07-12 RX ADMIN — POTASSIUM CHLORIDE 10 MEQ: 750 TABLET, EXTENDED RELEASE ORAL at 08:18

## 2020-07-12 RX ADMIN — LOSARTAN POTASSIUM 25 MG: 25 TABLET, FILM COATED ORAL at 08:18

## 2020-07-12 RX ADMIN — INSULIN ASPART 4 UNITS: 100 INJECTION, SOLUTION INTRAVENOUS; SUBCUTANEOUS at 18:05

## 2020-07-12 RX ADMIN — INSULIN ASPART 4 UNITS: 100 INJECTION, SOLUTION INTRAVENOUS; SUBCUTANEOUS at 08:19

## 2020-07-12 RX ADMIN — INSULIN ASPART 10 UNITS: 100 INJECTION, SOLUTION INTRAVENOUS; SUBCUTANEOUS at 13:37

## 2020-07-12 RX ADMIN — PIPERACILLIN SODIUM AND TAZOBACTAM SODIUM 3.38 G: 3; .375 INJECTION, POWDER, LYOPHILIZED, FOR SOLUTION INTRAVENOUS at 23:44

## 2020-07-12 RX ADMIN — UMECLIDINIUM 1 PUFF: 62.5 AEROSOL, POWDER ORAL at 09:32

## 2020-07-12 RX ADMIN — FUROSEMIDE 40 MG: 40 TABLET ORAL at 21:30

## 2020-07-12 ASSESSMENT — ACTIVITIES OF DAILY LIVING (ADL)
ADLS_ACUITY_SCORE: 14
COGNITION: 0 - NO COGNITION ISSUES REPORTED
SWALLOWING: 0-->SWALLOWS FOODS/LIQUIDS WITHOUT DIFFICULTY
BATHING: 0-->INDEPENDENT
DRESS: 0-->INDEPENDENT
ADLS_ACUITY_SCORE: 14
RETIRED_EATING: 0-->INDEPENDENT
TOILETING: 0-->INDEPENDENT
ADLS_ACUITY_SCORE: 15
FALL_HISTORY_WITHIN_LAST_SIX_MONTHS: YES
RETIRED_COMMUNICATION: 0-->UNDERSTANDS/COMMUNICATES WITHOUT DIFFICULTY
TRANSFERRING: 0-->INDEPENDENT
ADLS_ACUITY_SCORE: 13
ADLS_ACUITY_SCORE: 14
NUMBER_OF_TIMES_PATIENT_HAS_FALLEN_WITHIN_LAST_SIX_MONTHS: 2
ADLS_ACUITY_SCORE: 13
AMBULATION: 2-->ASSISTIVE PERSON

## 2020-07-12 ASSESSMENT — MIFFLIN-ST. JEOR: SCORE: 1380.38

## 2020-07-12 NOTE — PROVIDER NOTIFICATION
DATE:  7/12/2020   TIME OF RECEIPT FROM LAB:  1880  LAB TEST:  Pleural fluid culture.  LAB VALUE:  Moderate growth of staph aureus.  RESULTS GIVEN WITH READ-BACK TO (PROVIDER):  Dr. Gonzalez, Pulmonology  TIME LAB VALUE REPORTED TO PROVIDER:   1400

## 2020-07-12 NOTE — PROGRESS NOTES
07/12/20 1333   Quick Adds   Type of Visit Initial PT Evaluation   Living Environment   Lives With spouse   Living Arrangements house   Home Accessibility stairs to enter home;stairs within home   Number of Stairs, Main Entrance 2   Stair Railings, Main Entrance railing on right side (ascending)   Number of Stairs, Within Home, Primary   (13)   Stair Railings, Within Home, Primary railings on both sides of stairs   Transportation Anticipated family or friend will provide   Living Environment Comment Patient lives in a house with 2 steps to enter and a single railing and a full flight to the bedrooms.    Self-Care   Usual Activity Tolerance good   Current Activity Tolerance moderate   Equipment Currently Used at Home grab bar, tub/shower  (owns a walker)   Activity/Exercise/Self-Care Comment At baseline patient is IND with functional mobility & ADLs. Patient owns a walker endorses he will use it if he feels necessary.    Functional Level Prior   Ambulation 0-->independent   Transferring 0-->independent   Fall history within last six months yes   Number of times patient has fallen within last six months 2   Which of the above functional risks had a recent onset or change? ambulation;transferring   General Information   Onset of Illness/Injury or Date of Surgery - Date 07/11/20   Referring Physician Morgan Manuel, DO   Patient/Family Goals Statement To return to home & get stronger   Pertinent History of Current Problem (include personal factors and/or comorbidities that impact the POC) per chart: 80 year old male who was admitted on 7/10/2020.  He has had a persistent left pleural effusion with multiple admissions.  He was recently discharged and returned with more pulmonary complaints.  There was a concern of possible infection of the fluid.  Pulmonology was consulted and his pleux catheter was removed and a chest tube placed with fluid studies sent.  The fluid findings suggest infection.  Patient tested covid  negative this admission and recently prior to this admission.   Precautions/Limitations fall precautions   General Observations Greeted patient supine in bed with HOB elevated.   General Info Comments Activity: up with assist   Cognitive Status Examination   Orientation orientation to person, place and time   Level of Consciousness alert   Follows Commands and Answers Questions 100% of the time;able to follow multistep instructions   Cognitive Comment Appropriate behavior   Pain Assessment   Patient Currently in Pain No   Integumentary/Edema   Integumentary/Edema Comments chest tube in place; B LE edema   Posture    Posture Forward head position;Protracted shoulders;Kyphosis   Range of Motion (ROM)   ROM Comment B LE WFL   Strength   Strength Comments B LE functionally weak; requires use of UE for sit <> stand   Bed Mobility   Bed Mobility Comments supine > EOB at SBA   Transfer Skills   Transfer Comments sit <> stand with FWW at CGA   Gait   Gait Comments 10ft with FWW at CGA, appropriate pancho, decreased stride, head down & kyphotic posture   Balance   Balance Comments requires UE support of ambulation   Sensory Examination   Sensory Perception Comments baseline neuropathy   General Therapy Interventions   Planned Therapy Interventions balance training;bed mobility training;gait training;neuromuscular re-education;strengthening;transfer training;home program guidelines;progressive activity/exercise   Clinical Impression   Criteria for Skilled Therapeutic Intervention yes, treatment indicated   PT Diagnosis impaired functional mobility   Influenced by the following impairments LE weakness, decreased activity tolerance, fatigue   Functional limitations due to impairments bed mobility, transfers, ambulation, stairs   Clinical Presentation Evolving/Changing   Clinical Presentation Rationale PMH, current presentation, clinical judgement   Clinical Decision Making (Complexity) Low complexity   Therapy Frequency Daily  "  Predicted Duration of Therapy Intervention (days/wks) 4 days   Anticipated Discharge Disposition Home with Assist;Home with Home Therapy   Risk & Benefits of therapy have been explained Yes   Patient, Family & other staff in agreement with plan of care Yes   [facility] TM \"6 Clicks\"   2016, Trustees of [facility] [facility], under license to [employer].  All rights reserved.   6 Clicks Short Forms Basic Mobility Inpatient Short Form   [facility]  \"6 Clicks\" V.2 Basic Mobility Inpatient Short Form   1. Turning from your back to your side while in a flat bed without using bedrails? 4 - None   2. Moving from lying on your back to sitting on the side of a flat bed without using bedrails? 3 - A Little   3. Moving to and from a bed to a chair (including a wheelchair)? 3 - A Little   4. Standing up from a chair using your arms (e.g., wheelchair, or bedside chair)? 3 - A Little   5. To walk in hospital room? 3 - A Little   6. Climbing 3-5 steps with a railing? 3 - A Little   Basic Mobility Raw Score (Score out of 24.Lower scores equate to lower levels of function) 19   Total Evaluation Time   Total Evaluation Time (Minutes) 8     "

## 2020-07-12 NOTE — PROGRESS NOTES
"PULMONOLOGY PROGRESS NOTE    Date of Admission: 7/10/2020    CC/Reason for Hospital visit: left chest tube  SUBJECTIVE      Overnight events reviewed, appreicate IR assistance with removing pleurX and placing chest tube to drainage. Pleural fluid positive gram stain c/w empyema        ROS: A Problem Pertinent review of systems was negative except for items noted in HPI.  Past Medical, Family, and Social/Substance History has been reviewed: No interval changes.   OBJECTIVE   Vital signs:  Temp: 97.7  F (36.5  C) Temp src: Oral BP: 124/66 Pulse: 73 Heart Rate: 67 Resp: 16 SpO2: 96 % O2 Device: None (Room air) Oxygen Delivery: 2 LPM Height: 175.3 cm (5' 9\") Weight: 68.5 kg (151 lb 0.2 oz)  Estimated body mass index is 22.3 kg/m  as calculated from the following:    Height as of this encounter: 1.753 m (5' 9\").    Weight as of this encounter: 68.5 kg (151 lb 0.2 oz).        I/O last 3 completed shifts:  In: 480 [P.O.:480]  Out: 930 [Urine:150; Chest Tube:780]      CONSTITUTIONAL/GENERAL APPEARANCE: Alert oriented male, . No Apparent Distress.  PSYCHIATRIC: Pleasant and appropriate mood and affect. Oriented x 3.  EARS, NOSE,THROAT,MOUTH: External ears and nose overall normal. Normal oral mucosa.   NECK: Neck appearance normal. No neck masses and the thyroid is not enlarged.   RESPIRATORY: Non-labored effort. Left pleurX removed, chest tube in place to suction on pleuravac, no air leak  CARDIOVASCULAR: S1, S2, regular rate and rhythm, no murmur. Extremities with no edema.      LABORATORY ASSESSMENT    Arterial Blood GasNo lab results found in last 7 days.  CBC  Recent Labs   Lab 07/12/20  0806 07/10/20  2113 07/09/20  0549 07/08/20  1223   WBC 17.5* 28.7* 21.0* 15.1*   RBC 3.99* 3.88* 3.72* 3.91*   HGB 10.9* 10.8* 10.2* 10.7*   HCT 35.2* 33.6* 32.3* 34.4*   MCV 88 87 87 88   MCH 27.3 27.8 27.4 27.4   MCHC 31.0* 32.1 31.6 31.1*   RDW 17.1* 17.2* 17.7* 17.8*   * 496* 444 506*     BMP  Recent Labs   Lab 07/12/20  0806 " 07/10/20  2113 07/10/20  0555 07/09/20  0549    135 134 134   POTASSIUM 4.4 4.2 3.3* 3.5   CHLORIDE 99 100 98 100   LLOYD 8.8 9.1 8.2* 8.5   CO2 22 31 30 29   BUN 36* 27 24 20   CR 1.64* 1.68* 1.51* 1.53*   * 125* 186* 306*     INRNo lab results found in last 7 days.   BNP  Recent Labs   Lab 07/08/20  0944   NTBNP 2,210*     VENOUS BLOOD GASESNo lab results found in last 7 days.      Additional labs and/or comments:     IMAGING    IMPRESSION: New pleural catheter noted on the left. Improved left  effusion. Question some ex vacuo pleural air at the left base. Midlung  infiltrate unchanged. Right lung clear.    PFT & OTHER TESTING       ASSESSMENT / PLAN      Pulmonary diagnoses:  Abnl CT/CXR R91.8  CHF I50.9  Empyema J86.9     Additional COVID-19 diagnoses:  Concern of possible exposure to COVID-19, Now RULED OUT Z03.818        ASSESSMENT : Tc Garcia has a complicated medical history including acute on chronic heart failure and pulmonary hypertension as well as recurrent left pleural effusion and he has paroxysmal atrial fibrillation for which he is on Coumadin and diabetes as well as chronic obstructive pulmonary disease, amongst other diagnoses, who presents with profound weakness, worsening elevation of her white count and recurrent left pleural effusion with near collapse, being admitted for further treatment.         PLAN:     Continue chest tube to suction, CXR looks better    Await cultures results with positive gram stain    Cont zosyn    Monitor drainage, if needed, can try lytics tomorrow    On room air    Will continue to follow along      Dinora Gonzalez M.D.  [facility]  Office: [phone]  Pager: [phone]

## 2020-07-12 NOTE — PLAN OF CARE
Pt states feeling better today, chest tube patent, 230 ml of serous drainage recorded, pt denies pain. Blood glucose levels elevated, insulin regimen adjusted by provider.

## 2020-07-12 NOTE — PLAN OF CARE
A&Ox4. VSS on RA. No SOB noted. Assist of 1. Tolerating regular diet. Lung sounds diminished. Encouraged IS. Bowel sounds active. Adequate urine output. CT to -20 suction, no air leak, serous output. BLE edema +2. States he if feeling a lot better since the CT was placed, continue to monitor.

## 2020-07-12 NOTE — PLAN OF CARE
PT orders received & acknowledged. Chart reviewed. Eval completed & treatment initiated.     Patient lives in a house with 2 steps to enter and a single railing and a full flight to the bedrooms. At baseline patient is IND with functional mobility & ADLs. Patient owns a walker endorses he will use it if he feels necessary.     Discharge Planner PT   Patient plan for discharge: Home with spouse & continued home therapies  Current status: Greeted patient supine in bed and agreeable to session. Discussed chest tube management with nursing for mobility. Engaged patient in supine > EOB at SBA with HOB elevated. Engaged patient in sit <> stand with FWW at [facility]. Engaged patient in ambulation with FWW at [facility]-SBA with cues for technique for a total distance of 225ft. Patient declines further activity as his lunch has arrived. Concluded session with patient sitting up in chair, all needs in reach and nursing aware of status.   Barriers to return to prior living situation: LE weakness, decreased activity, impaired balance, fall risk    Recommendations for discharge: Home with assist from spouse for household tasks, HH-PT  Rationale for recommendations: Patient is anticipated to continue progressing functional mobility with medical management & IP PT services in order to demonstrate necessary level of functional mobility in order to return to home environment with use of FWW.     Patient is recommended to continue with skilled HH-PT in order to improve LE strength, balance and activity tolerance in order to return to baseline mobility.  Pt appropriate for Home PT as they require AD, assistive person, and taxing effort to leave the home.         Entered by: Wendy Gotti 07/12/2020 1:49 PM

## 2020-07-12 NOTE — PROGRESS NOTES
[facility]  Hospitalist Progress Note  Name: Tc Garcia    MRN: [id number]  Physician:  Morgan Manuel DO, FHM (Text Page)    Summary of Stay: Tc Garcia is a 80 year old male who was admitted on 7/10/2020.  He has had a persistent left pleural effusion with multiple admissions.  He was recently discharged and returned with more pulmonary complaints.  There was a concern of possible infection of the fluid.  Pulmonology was consulted and his pleux catheter was removed and a chest tube placed with fluid studies sent.  The fluid findings suggest infection.  Patient tested covid negative this admission and recently prior to this admission.    Assessment & Plan    Recurrent left pleural effusion with studies suggestive of empyema:  -  Chest tube, will defer management and consideration of lytics to pulmonology.  -  Continue zosyn, cultures pending.  -  Not requiring oxygen currently, monitor  -  Thoracic surgery consulted/aware of patient.    DM:  -  Some low glucoses when NPO for procedures 7/11.  He then developed hyperglycemia when diet resumed as he had less than his usual insulin.  Plan this AM was for him to have his lantus + meal insulin + low ssi and follow trend.  Noted his midday glu is over 400 and I just called his RN.  He is eating a late lunch and just eating now.  I increased sliding scale insulin to high.  He is to receive his meal 4 units + 7 more units now.   Also clarified diet to medium consistent carb.  I will consider more meal scheduled/carb count insulin at supper depending on afternoon trend.    CKD:  -  Monitor, avoid nephrotoxic meds as able.  -  On chronic potassium and lasix.  Continue but will recheck BMP in AM.    Parox afib on chronic anticoag with apixaban:  -  Apixaban on hold with recent procedures.  Consider resuming tomorrow if acceptable with pulmonology and no further immediate procedures needed.  -  Continue verapamil    HTN:  -  Controlled, Continue  cozaar    Hyperlipidemia:  -  Continue lovastatin    COPD without an acute exacerbation:  -  Incruse Ellipta          COVID Status:  COVID-19 PCR Results    COVID-19 PCR Results 4/25/20 4/25/20 5/10/20 5/10/20 5/21/20 5/21/20 7/8/20 7/8/20 7/10/20 7/10/20    1450 1450 2207 2207 1403 1403 1603 1603 2348 2348   COVID-19 Virus PCR to [facility] - Result Test received-See reflex to IDDL test SARS CoV2 (COVID-19) Virus RT-PCR  Test received-See reflex to IDDL test SARS CoV2 (COVID-19) Virus RT-PCR  Test received-See reflex to IDDL test SARS CoV2 (COVID-19) Virus RT-PCR  Test received-See reflex to IDDL test SARS CoV2 (COVID-19) Virus RT-PCR  Test received-See reflex to IDDL test SARS CoV2 (COVID-19) Virus RT-PCR    COVID-19 Virus PCR to [facility] - Source Nasopharyngeal  Nasopharyngeal (C)  Nasopharyngeal  Nasopharyngeal  Nasopharyngeal    SARS-CoV-2 Virus Specimen Source  Nasopharyngeal  Nasopharyngeal  Nasopharyngeal  Nasopharyngeal  Nasopharyngeal   SARS-CoV-2 PCR Result  NEGATIVE  NEGATIVE  NEGATIVE  NEGATIVE  NEGATIVE   (C) Corrected value       Comments are available for some flowsheets but are not being displayed.         COVID-19 Antibody Results, Testing for Immunity    COVID-19 Antibody Results, Testing for Immunity   No data to display.            Diet: Regular Diet Adult    DVT Prophylaxis: Pneumatic Compression Devices  Pruett Catheter: not present  Code Status: Full Code      Disposition Plan   Expect him to need at least a couple more days in the hospital.  Ultimate plan is discharge to home.     Entered: Morgan Manuel 07/12/2020, 1:23 PM       Interval History   Mr. Garcia without new complaints.  Reports resp status seems somewhat improved.  No major pain with chest tube.  No other new complaints.    -Data reviewed today: I reviewed all new labs and imaging reports over the last 24 hours. I personally reviewed no images or EKG's today.    Physical Exam   Temp: 97.6  F (36.4  C) Temp src: Oral BP: 108/60  Pulse: 73 Heart Rate: 53 Resp: 16 SpO2: 96 % O2 Device: None (Room air) Oxygen Delivery: 2 LPM  Vitals:    07/10/20 2127 07/11/20 0134 07/12/20 0636   Weight: 68 kg (150 lb) 69.7 kg (153 lb 9.6 oz) 68.5 kg (151 lb 0.2 oz)     Vital Signs with Ranges  Temp:  [97.6  F (36.4  C)-98.5  F (36.9  C)] 97.6  F (36.4  C)  Pulse:  [] 73  Heart Rate:  [] 53  Resp:  [12-33] 16  BP: (108-138)/(60-95) 108/60  SpO2:  [94 %-100 %] 96 %  I/O last 3 completed shifts:  In: 480 [P.O.:480]  Out: 930 [Urine:150; Chest Tube:780]    GEN:  Alert, oriented x 3, appears comfortable, no overt distress.  HEENT:  Normocephalic/atraumatic, no scleral icterus, no nasal discharge, mouth moist.  CV:  Regular rate and rhythm, no loud murmur/rub.  LUNGS:  Chest tube in place on left.  Coarse breath sounds.  Moving air well on exam.  No wheezing. Symmetric chest rise on inhalation noted.  ABD:  Active bowel sounds, soft, non-tender/non-distended.  No rebound/guarding/rigidity.  EXT:  No edema.  No cyanosis.  No acute joint synovitis noted.  SKIN:  Dry to touch, no exanthems noted in the visualized areas.    Medications     - MEDICATION INSTRUCTIONS -         furosemide  40 mg Oral BID     insulin aspart  4 Units Subcutaneous TID w/meals     insulin aspart  1-3 Units Subcutaneous TID AC     insulin aspart  1-3 Units Subcutaneous At Bedtime     insulin glargine  14 Units Subcutaneous QAM     losartan  25 mg Oral Daily     piperacillin-tazobactam  3.375 g Intravenous Q6H     potassium chloride ER  10 mEq Oral BID     pravastatin  20 mg Oral Daily     sodium chloride (PF)  3 mL Intracatheter Q8H     umeclidinium  1 puff Inhalation Daily     verapamil ER  240 mg Oral At Bedtime     Data     Recent Labs   Lab 07/12/20  0806 07/10/20  2113 07/09/20  0549   WBC 17.5* 28.7* 21.0*   HGB 10.9* 10.8* 10.2*   HCT 35.2* 33.6* 32.3*   MCV 88 87 87   * 496* 444     Recent Labs   Lab 07/11/20  1500 07/10/20  2302 07/10/20  2221   CULT Culture in  progress No growth after 1 day No growth after 1 day     Recent Labs   Lab 07/12/20  0806 07/11/20  0600 07/10/20  2113 07/10/20  0555     --  135 134   POTASSIUM 4.4  --  4.2 3.3*   CHLORIDE 99  --  100 98   CO2 22  --  31 30   ANIONGAP 12  --  4 6   *  --  125* 186*   BUN 36*  --  27 24   CR 1.64*  --  1.68* 1.51*   GFRESTIMATED 39*  --  37* 43*   GFRESTBLACK 45*  --  43* 49*   LLOYD 8.8  --  9.1 8.2*   PROTTOTAL  --  5.4* 5.9*  --    ALBUMIN  --   --  2.5*  --    BILITOTAL  --   --  0.9  --    ALKPHOS  --   --  98  --    AST  --   --  12  --    ALT  --   --  13  --      Recent Labs   Lab 07/12/20  1154 07/12/20  0806 07/12/20  0736 07/12/20  0554 07/12/20  0207 07/11/20 2213  07/10/20  2113  07/10/20  0555  07/09/20  0549  07/08/20  0944   GLC  --  409*  --   --   --   --   --  125*  --  186*  --  306*  --  373*   *  --  401* 375* 343* 317*   < >  --    < >  --    < >  --    < >  --     < > = values in this interval not displayed.       Recent Results (from the past 24 hour(s))   IR Chest Tube Place Non Tunneled Left    Narrative    [facility]  DATE: 7/11/2020    PROCEDURE:  1. IMAGE GUIDED CHEST TUBE PLACEMENT  2. REMOVAL OF LEFT TUNNELED PLEURAL DRAINAGE CATHETER    INTERVENTIONAL RADIOLOGIST: Ashok Koroma MD    INDICATION: The patient is an 80-year-old male with a history of  recurrent left pleural effusion requiring tunneled pleural drainage  catheter placement which was performed on 5/12/2020. The patient now  presents with a history of erythema around the tract.    CONSENT: The risks, benefits and alternatives of chest tube placement  and pleural drainage catheter removal were discussed with the patient   in detail. All questions were answered. Informed consent was given to  proceed with the procedure.    MODERATE SEDATION: Moderate sedation was not utilized. The patient was  given 50 mcg of IV sentinel for pain control.    CONTRAST: None.  ANTIBIOTICS: None.  ADDITIONAL  MEDICATIONS: None.    FLUOROSCOPIC TIME: 1.9 minutes.  RADIATION DOSE: Air Kerma: 17 mGy.    COMPLICATIONS: No immediate complications.    STERILE BARRIER TECHNIQUE: Maximum sterile barrier technique was used.  Cutaneous antisepsis was performed at the operative site with  application of 2% chlorhexidine and large sterile drape. Prior to the  procedure, the  and assistant performed hand hygiene and wore  hat, mask, sterile gown, and sterile gloves during the entire  procedure.    PROCEDURE:  Utilizing ultrasoundimaging, a  image was obtained. The skin was  anesthetized using 1% lidocaine. A 19-gauge/4 [place] 360imaging  needle/catheter combination was advanced under image guidance into the  left pleural space. The needle was removed. Through the needle, a  0.035 inch Amplatz wire was coiled within the pleural space. The  catheter was removed. The tract was serially dilated. Over the wire, a  modified 14 Fr pigtail drainage catheter was placed. The pigtail  locking mechanism was engaged. The catheter was then attached to a  Pleur-Evac device. The catheter was secured to the skin utilizing a  2-0 nylon suture. A clean and sterile dressing was applied. The  patient tolerated the procedure well. 120 cc were aspirated and sent  for Gram stain and culture and on the left as ordered.     Attention was then turned to the tunneled left pleural drainage  catheter. The skin was anesthetized with 1% lidocaine. Utilizing  gentle traction, the tube was removed in its entirety. The skin was  then closed with a combination of 4-0 absorbable sutures and  Dermabond.    FINDINGS:   imaging demonstrated loculated left pleural effusion.  Postplacement fluoroscopic imaging demonstrates good positioning of  the nonlocking straight 14 [place] drainage catheter.     Uncomplicated removal of tunneled pleural drainage catheter without  residual fragment.      Impression    IMPRESSION:    1.  Uncomplicated image guided placement of  modified straight  nonlocking left 14 Citizen of [place] pleural drainage catheter. Drainage catheter  attached to Pleur-Evac.   2.  Uncomplicated removal of left tunneled pleural drainage catheter.    ROSA KOROMA MD   IR Chest Tube Removal Tunneled Left    Narrative    [facility]  DATE: 7/11/2020    PROCEDURE:  1. IMAGE GUIDED CHEST TUBE PLACEMENT  2. REMOVAL OF LEFT TUNNELED PLEURAL DRAINAGE CATHETER    INTERVENTIONAL RADIOLOGIST: Rosa Koroma MD    INDICATION: The patient is an 80-year-old male with a history of  recurrent left pleural effusion requiring tunneled pleural drainage  catheter placement which was performed on 5/12/2020. The patient now  presents with a history of erythema around the tract.    CONSENT: The risks, benefits and alternatives of chest tube placement  and pleural drainage catheter removal were discussed with the patient   in detail. All questions were answered. Informed consent was given to  proceed with the procedure.    MODERATE SEDATION: Moderate sedation was not utilized. The patient was  given 50 mcg of IV sentinel for pain control.    CONTRAST: None.  ANTIBIOTICS: None.  ADDITIONAL MEDICATIONS: None.    FLUOROSCOPIC TIME: 1.9 minutes.  RADIATION DOSE: Air Kerma: 17 mGy.    COMPLICATIONS: No immediate complications.    STERILE BARRIER TECHNIQUE: Maximum sterile barrier technique was used.  Cutaneous antisepsis was performed at the operative site with  application of 2% chlorhexidine and large sterile drape. Prior to the  procedure, the  and assistant performed hand hygiene and wore  hat, mask, sterile gown, and sterile gloves during the entire  procedure.    PROCEDURE:  Utilizing ultrasoundimaging, a  image was obtained. The skin was  anesthetized using 1% lidocaine. A 19-gauge/4 Citizen of [place]  needle/catheter combination was advanced under image guidance into the  left pleural space. The needle was removed. Through the needle, a  0.035 inch Amplatz wire was coiled within  the pleural space. The  catheter was removed. The tract was serially dilated. Over the wire, a  modified 14 Fr pigtail drainage catheter was placed. The pigtail  locking mechanism was engaged. The catheter was then attached to a  Pleur-Evac device. The catheter was secured to the skin utilizing a  2-0 nylon suture. A clean and sterile dressing was applied. The  patient tolerated the procedure well. 120 cc were aspirated and sent  for Gram stain and culture and on the left as ordered.     Attention was then turned to the tunneled left pleural drainage  catheter. The skin was anesthetized with 1% lidocaine. Utilizing  gentle traction, the tube was removed in its entirety. The skin was  then closed with a combination of 4-0 absorbable sutures and  Dermabond.    FINDINGS:   imaging demonstrated loculated left pleural effusion.  Postplacement fluoroscopic imaging demonstrates good positioning of  the nonlocking straight 14 [place] Island[place] drainage catheter.     Uncomplicated removal of tunneled pleural drainage catheter without  residual fragment.      Impression    IMPRESSION:    1.  Uncomplicated image guided placement of modified straight  nonlocking left 14 [place] Island[place] pleural drainage catheter. Drainage catheter  attached to Pleur-Evac.   2.  Uncomplicated removal of left tunneled pleural drainage catheter.    ROSA JORGENSEN MD   XR Chest Port 1 View    Narrative    CHEST ONE VIEW  7/12/2020 9:55 AM     HISTORY: Post chest tube placement.    COMPARISON: July 10, 2020      Impression    IMPRESSION: New pleural catheter noted on the left. Improved left  effusion. Question some ex vacuo pleural air at the left base. Midlung  infiltrate unchanged. Right lung clear.    ALLYSON MARTINEZ MD

## 2020-07-12 NOTE — PHARMACY-VANCOMYCIN DOSING SERVICE
Pharmacy Vancomycin Initial Note  Date of Service 2020  Patient's  1940  80 year old, male    Indication: [facility] Pneumonia and Empyema.    Current estimated CrCl = Estimated Creatinine Clearance: 34.2 mL/min (A) (based on SCr of 1.64 mg/dL (H)).    Creatinine for last 3 days  7/10/2020:  5:55 AM Creatinine 1.51 mg/dL;  9:13 PM Creatinine 1.68 mg/dL  2020:  8:06 AM Creatinine 1.64 mg/dL    Recent Vancomycin Level(s) for last 3 days  No results found for requested labs within last 72 hours.      Vancomycin IV Administrations (past 72 hours)                   vancomycin 1500 mg in 0.9% NaCl 250 ml intermittent infusion 1,500 mg (mg) 1,500 mg Given 20 0223                Nephrotoxins and other renal medications (From now, onward)    Start     Dose/Rate Route Frequency Ordered Stop    20 1500  vancomycin 1250 mg in 0.9% NaCl 250 mL intermittent infusion 1,250 mg      1,250 mg  over 90 Minutes Intravenous EVERY 24 HOURS 20 1437      20 0800  furosemide (LASIX) tablet 40 mg      40 mg Oral 2 TIMES DAILY 20 0223      20 0600  piperacillin-tazobactam (ZOSYN) 3.375 g vial to attach to  mL bag      3.375 g  over 1 Hours Intravenous EVERY 6 HOURS 20 0223            Contrast Orders - past 72 hours (72h ago, onward)    None                Plan:  1.  Start vancomycin  1250 mg IV q24h.   2.  Goal Trough Level: 15-20 mg/L   3.  Pharmacy will check trough levels as appropriate in 3-5 Days.    4. Serum creatinine levels will be ordered daily for the first week of therapy and at least twice weekly for subsequent weeks.    5. [place] method utilized to dose vancomycin therapy: Method 2    Dariana Moya, PharmD, BCPS

## 2020-07-12 NOTE — PROGRESS NOTES
Positive staph in pleural fluid  Order vanc with pharmacy to dose    Suggest ID consult in AM, defer to hospitalist     Dinora Gonzalez M.D.  [facility]  Office: [phone]  Pager: [phone]

## 2020-07-13 ENCOUNTER — APPOINTMENT (OUTPATIENT)
Dept: PHYSICAL THERAPY | Facility: CLINIC | Age: 80
DRG: 178 | End: 2020-07-13
Payer: COMMERCIAL

## 2020-07-13 LAB
GLUCOSE BLDC GLUCOMTR-MCNC: 150 MG/DL (ref 70–99)
GLUCOSE BLDC GLUCOMTR-MCNC: 192 MG/DL (ref 70–99)
GLUCOSE BLDC GLUCOMTR-MCNC: 272 MG/DL (ref 70–99)
GLUCOSE BLDC GLUCOMTR-MCNC: 72 MG/DL (ref 70–99)
GLUCOSE BLDC GLUCOMTR-MCNC: 90 MG/DL (ref 70–99)

## 2020-07-13 PROCEDURE — 25000128 H RX IP 250 OP 636: Performed by: INTERNAL MEDICINE

## 2020-07-13 PROCEDURE — 99232 SBSQ HOSP IP/OBS MODERATE 35: CPT | Performed by: INTERNAL MEDICINE

## 2020-07-13 PROCEDURE — 25000132 ZZH RX MED GY IP 250 OP 250 PS 637: Performed by: INTERNAL MEDICINE

## 2020-07-13 PROCEDURE — 00000146 ZZHCL STATISTIC GLUCOSE BY METER IP

## 2020-07-13 PROCEDURE — 25000131 ZZH RX MED GY IP 250 OP 636 PS 637: Performed by: INTERNAL MEDICINE

## 2020-07-13 PROCEDURE — 97530 THERAPEUTIC ACTIVITIES: CPT | Mod: GP

## 2020-07-13 PROCEDURE — 25800030 ZZH RX IP 258 OP 636: Performed by: INTERNAL MEDICINE

## 2020-07-13 PROCEDURE — 97116 GAIT TRAINING THERAPY: CPT | Mod: GP

## 2020-07-13 PROCEDURE — 12000000 ZZH R&B MED SURG/OB

## 2020-07-13 RX ADMIN — POTASSIUM CHLORIDE 10 MEQ: 750 TABLET, EXTENDED RELEASE ORAL at 07:52

## 2020-07-13 RX ADMIN — INSULIN ASPART 1 UNITS: 100 INJECTION, SOLUTION INTRAVENOUS; SUBCUTANEOUS at 07:53

## 2020-07-13 RX ADMIN — INSULIN ASPART 4 UNITS: 100 INJECTION, SOLUTION INTRAVENOUS; SUBCUTANEOUS at 18:14

## 2020-07-13 RX ADMIN — INSULIN ASPART 4 UNITS: 100 INJECTION, SOLUTION INTRAVENOUS; SUBCUTANEOUS at 11:44

## 2020-07-13 RX ADMIN — POTASSIUM CHLORIDE 10 MEQ: 750 TABLET, EXTENDED RELEASE ORAL at 20:02

## 2020-07-13 RX ADMIN — PIPERACILLIN SODIUM AND TAZOBACTAM SODIUM 3.38 G: 3; .375 INJECTION, POWDER, LYOPHILIZED, FOR SOLUTION INTRAVENOUS at 05:40

## 2020-07-13 RX ADMIN — VERAPAMIL HYDROCHLORIDE 240 MG: 240 TABLET, FILM COATED, EXTENDED RELEASE ORAL at 21:19

## 2020-07-13 RX ADMIN — INSULIN ASPART 4 UNITS: 100 INJECTION, SOLUTION INTRAVENOUS; SUBCUTANEOUS at 07:53

## 2020-07-13 RX ADMIN — INSULIN ASPART 3 UNITS: 100 INJECTION, SOLUTION INTRAVENOUS; SUBCUTANEOUS at 18:12

## 2020-07-13 RX ADMIN — LOSARTAN POTASSIUM 25 MG: 25 TABLET, FILM COATED ORAL at 07:53

## 2020-07-13 RX ADMIN — FUROSEMIDE 40 MG: 40 TABLET ORAL at 20:02

## 2020-07-13 RX ADMIN — PIPERACILLIN SODIUM AND TAZOBACTAM SODIUM 3.38 G: 3; .375 INJECTION, POWDER, LYOPHILIZED, FOR SOLUTION INTRAVENOUS at 11:44

## 2020-07-13 RX ADMIN — PIPERACILLIN SODIUM AND TAZOBACTAM SODIUM 3.38 G: 3; .375 INJECTION, POWDER, LYOPHILIZED, FOR SOLUTION INTRAVENOUS at 18:12

## 2020-07-13 RX ADMIN — INSULIN GLARGINE 14 UNITS: 100 INJECTION, SOLUTION SUBCUTANEOUS at 07:53

## 2020-07-13 RX ADMIN — INSULIN ASPART 6 UNITS: 100 INJECTION, SOLUTION INTRAVENOUS; SUBCUTANEOUS at 11:44

## 2020-07-13 RX ADMIN — PRAVASTATIN SODIUM 20 MG: 20 TABLET ORAL at 07:52

## 2020-07-13 RX ADMIN — FUROSEMIDE 40 MG: 40 TABLET ORAL at 07:53

## 2020-07-13 RX ADMIN — UMECLIDINIUM 1 PUFF: 62.5 AEROSOL, POWDER ORAL at 07:53

## 2020-07-13 RX ADMIN — VANCOMYCIN HYDROCHLORIDE 1250 MG: 5 INJECTION, POWDER, LYOPHILIZED, FOR SOLUTION INTRAVENOUS at 15:42

## 2020-07-13 ASSESSMENT — ACTIVITIES OF DAILY LIVING (ADL)
ADLS_ACUITY_SCORE: 13

## 2020-07-13 NOTE — PLAN OF CARE
Discharge Planner PT   Patient plan for discharge: Home  Current status:   Pt progressing well. Pt ambulated 150' with FWW, SBA. Pt ambulated additional 150' no assistive device, CGA, no overt LOB however minor path deviation. Stair training up/down 3 steps with CGA. Reviewed seated exercises. Pt agreeable to sit up in chair with encouragement, reluctant due to neck pain, provided with towel roll for comfort. Alarm on and needs in reach   Barriers to return to prior living situation: Impaired activity tolerance, functional weakness, impaired high level balance  Recommendations for discharge: Home with HHPT  Rationale for recommendations: at this time recommend HHPT as pt continues to fatigue quickly with therapy, at this time leaving the house would be taxing effort. However pt may progress during IP stay to be appropriate for OP PT. Will cont to update rec          Entered by: Ramonita Alvarez 07/13/2020 11:13 AM

## 2020-07-13 NOTE — PROGRESS NOTES
"[facility]  Hospitalist Progress Note  Nicolás Moreno MD  07/13/2020    Assessment & Plan  Interval History      Summary of Stay: Tc Garcia is a 80 year old male who was admitted on 7/10/2020.  He has had a persistent left pleural effusion with multiple admissions.  He was recently discharged and returned with more pulmonary complaints.  There was a concern of possible infection of the fluid.  Pulmonology was consulted and his pleux catheter was removed and a chest tube placed with fluid studies sent.  The fluid findings suggest infection.  Patient tested covid negative this admission and recently prior to this admission.        Assessment & Plan      Recurrent left pleural effusion with studies suggestive of empyema:  -  Chest tube, will defer management and consideration of lytics to pulmonology, discussed with Dr Hunt who felt surgery as a \"last resort\" and continued treatment with pleurx cathter and lytics.  -  noted change to ancef  -  Not requiring oxygen currently, monitor  -  Thoracic surgery consulted/aware of patient.     DM:  -  Some low glucoses when NPO for procedures 7/11.  He then developed hyperglycemia when diet resumed as he had less than his usual insulin.   - Current plan with lantus + meal insulin + low ssi changed to high resistance sliding scale insulin and mealtime dose increased from 4 unit(s) to 7 unit(s)   - daytime BG better controlled but still not optimal     CKD:  - Monitor, avoid nephrotoxic meds as able.  - On chronic potassium and lasix.   - no bmp ordered today, will check tomorrow     Parox afib on chronic anticoag with apixaban:  - Apixaban on hold with recent procedures.  Consider resuming tomorrow if acceptable with pulmonology and no further immediate procedures needed.  - Continue verapamil  - restart apixiban 2.5 mg bid on 7/14     HTN:  -  Controlled, Continue cozaar     Hyperlipidemia:  -  Continue lovastatin     COPD without an acute " "exacerbation:  -  Incruse Ellipta           COVID Status:               COVID-19 PCR Results    COVID-19 PCR Results 4/25/20 4/25/20 5/10/20 5/10/20 5/21/20 5/21/20 7/8/20 7/8/20 7/10/20 7/10/20     1450 1450 2207 2207 1403 1403 1603 1603 2348 2348   COVID-19 Virus PCR to [facility] - Result Test received-See reflex to IDDL test SARS CoV2 (COVID-19) Virus RT-PCR   Test received-See reflex to IDDL test SARS CoV2 (COVID-19) Virus RT-PCR   Test received-See reflex to IDDL test SARS CoV2 (COVID-19) Virus RT-PCR   Test received-See reflex to IDDL test SARS CoV2 (COVID-19) Virus RT-PCR   Test received-See reflex to IDDL test SARS CoV2 (COVID-19) Virus RT-PCR     COVID-19 Virus PCR to [facility] - Source Nasopharyngeal   Nasopharyngeal (C)   Nasopharyngeal   Nasopharyngeal   Nasopharyngeal     SARS-CoV-2 Virus Specimen Source   Nasopharyngeal   Nasopharyngeal   Nasopharyngeal   Nasopharyngeal   Nasopharyngeal   SARS-CoV-2 PCR Result   NEGATIVE   NEGATIVE   NEGATIVE   NEGATIVE   NEGATIVE   (C) Corrected value        Comments are available for some flowsheets but are not being displayed.           COVID-19 Antibody Results, Testing for Immunity    COVID-19 Antibody Results, Testing for Immunity   No data to display.              Diet: Regular Diet Adult    DVT Prophylaxis: Pneumatic Compression Devices  Pruett Catheter: not present  Code Status: Full Code         -- chart reviewed  -- discussed with Dr Hunt  -- patient looks much better than I had seen him at time of admission.    -Data reviewed today: I reviewed all new labs and imaging over the last 24 hours. I personally reviewed no images or EKG's today.    Physical Exam   Heart Rate: 62, Blood pressure 102/65, pulse 68, temperature 96.8  F (36  C), temperature source Oral, resp. rate 20, height 1.753 m (5' 9\"), weight 68.5 kg (151 lb 0.2 oz), SpO2 98 %.  Vitals:    07/10/20 2127 07/11/20 0134 07/12/20 0636   Weight: 68 kg (150 lb) 69.7 kg (153 lb 9.6 oz) 68.5 kg (151 lb 0.2 " oz)     Vital Signs with Ranges  Temp:  [96.8  F (36  C)-97.9  F (36.6  C)] 96.8  F (36  C)  Pulse:  [68] 68  Heart Rate:  [57-76] 62  Resp:  [14-20] 20  BP: ()/(61-72) 102/65  SpO2:  [92 %-98 %] 98 %  I/O's Last 24 hours  I/O last 3 completed shifts:  In: 1440 [P.O.:1440]  Out: 1355 [Urine:1075; Chest Tube:280]    Constitutional: Awake, alert, cooperative, no apparent distress  Respiratory: Diminished left lung base  Cardiovascular:  irregular  GI: Normal bowel sounds, soft, non-distended, non-tender  Skin/Integumen: No rashes, no cyanosis, no edema  Other:      Medications   All medications were reviewed.    - MEDICATION INSTRUCTIONS -         furosemide  40 mg Oral BID     insulin aspart  1-10 Units Subcutaneous TID AC     insulin aspart  1-7 Units Subcutaneous At Bedtime     insulin aspart  4 Units Subcutaneous TID w/meals     insulin glargine  14 Units Subcutaneous QAM     losartan  25 mg Oral Daily     piperacillin-tazobactam  3.375 g Intravenous Q6H     potassium chloride ER  10 mEq Oral BID     pravastatin  20 mg Oral Daily     sodium chloride (PF)  3 mL Intracatheter Q8H     umeclidinium  1 puff Inhalation Daily     vancomycin (VANCOCIN) IV  1,250 mg Intravenous Q24H     verapamil ER  240 mg Oral At Bedtime        Data   Recent Labs   Lab 07/12/20  0806 07/11/20  0600 07/10/20  2113 07/10/20  0555 07/09/20  0549 07/08/20  1223   WBC 17.5*  --  28.7*  --  21.0* 15.1*   HGB 10.9*  --  10.8*  --  10.2* 10.7*   MCV 88  --  87  --  87 88   *  --  496*  --  444 506*     --  135 134 134  --    POTASSIUM 4.4  --  4.2 3.3* 3.5  --    CHLORIDE 99  --  100 98 100  --    CO2 22  --  31 30 29  --    BUN 36*  --  27 24 20  --    CR 1.64*  --  1.68* 1.51* 1.53*  --    ANIONGAP 12  --  4 6 5  --    LLOYD 8.8  --  9.1 8.2* 8.5  --    *  --  125* 186* 306*  --    ALBUMIN  --   --  2.5*  --   --   --    PROTTOTAL  --  5.4* 5.9*  --   --   --    BILITOTAL  --   --  0.9  --   --   --    ALKPHOS  --   --   98  --   --   --    ALT  --   --  13  --   --   --    AST  --   --  12  --   --   --    TROPI  --   --   --   --   --  <0.015       No results found for this or any previous visit (from the past 24 hour(s)).    Nicolás Moreno MD  Text Page  (7am to 6pm)

## 2020-07-13 NOTE — PROGRESS NOTES
A/O x 4, vss, a-febrile, denies pain, up to the bathroom with one assist, gait belt and walker, chest tube on left upper chest  To wall suction, draining serous drainage, no air leak, blood sugars are elevated insulin resistance increased per sliding scale and 4 units scheduled with meals, dressing on chest tube site  CDI.

## 2020-07-13 NOTE — PLAN OF CARE
A&Ox4. VSS on RA. Assist of 1. Tolerating mod carb diet. Lung sounds diminished, fine crackles in L lower lobe. No SOB noted. IS encouraged. Bowel sounds active, + flatus. Adequate urine output. CT -20 suction, serous drainage. Denies pain.

## 2020-07-13 NOTE — PROGRESS NOTES
PULMONARY NOTE - Chart Check    Chart reviewed.  PleurX cather removed on 7/11, replaced with a chest tube.  Chest tube drained 510 ml yesterday.  Pleural fluid labs c/w empyema, culture (+) Staph aureus, (S) pending.  Patient currently on Zosyn & Vanco.  Recommend ID consult as per note yesterday.  Will see patient in follow-up tomorrow.      Philip Aguilera MD    [facility] / [facility]  [phone] (pager)  [phone] (office)

## 2020-07-13 NOTE — PROGRESS NOTES
SPIRITUAL HEALTH SERVICES: Tele-Encounter  Patient Location: 33  Spoke with: Nurse    Referral Source: Request    DATA:  Attempted three times to get in touch via phone with pt per request at admission. Pt did not respond to phone calls. Called nurse to make sure pt could reach the phone but pt did not     PLAN:  Will check-in again tomorrow.      Mulugeta Rangel Intern      ______________________________    Type of service:  Telephone Visit     has received verbal consent for a TelephoneVisit from the patient? Yes    Distance Provider Location: designated [place] office or home office (secure setting)    Mode of Communication: telephone (via [employer] phone or [employer] tele-call-number ([phone]))

## 2020-07-14 ENCOUNTER — APPOINTMENT (OUTPATIENT)
Dept: PHYSICAL THERAPY | Facility: CLINIC | Age: 80
DRG: 178 | End: 2020-07-14
Payer: COMMERCIAL

## 2020-07-14 ENCOUNTER — HOME INFUSION (PRE-WILLOW HOME INFUSION) (OUTPATIENT)
Dept: PHARMACY | Facility: CLINIC | Age: 80
End: 2020-07-14

## 2020-07-14 LAB
ANION GAP SERPL CALCULATED.3IONS-SCNC: 7 MMOL/L (ref 3–14)
BUN SERPL-MCNC: 29 MG/DL (ref 7–30)
CALCIUM SERPL-MCNC: 8.3 MG/DL (ref 8.5–10.1)
CHLORIDE SERPL-SCNC: 100 MMOL/L (ref 94–109)
CO2 SERPL-SCNC: 28 MMOL/L (ref 20–32)
COPATH REPORT: NORMAL
CREAT SERPL-MCNC: 1.72 MG/DL (ref 0.66–1.25)
ERYTHROCYTE [DISTWIDTH] IN BLOOD BY AUTOMATED COUNT: 16.8 % (ref 10–15)
GFR SERPL CREATININE-BSD FRML MDRD: 36 ML/MIN/{1.73_M2}
GLUCOSE BLDC GLUCOMTR-MCNC: 190 MG/DL (ref 70–99)
GLUCOSE BLDC GLUCOMTR-MCNC: 220 MG/DL (ref 70–99)
GLUCOSE BLDC GLUCOMTR-MCNC: 333 MG/DL (ref 70–99)
GLUCOSE BLDC GLUCOMTR-MCNC: 56 MG/DL (ref 70–99)
GLUCOSE BLDC GLUCOMTR-MCNC: 68 MG/DL (ref 70–99)
GLUCOSE BLDC GLUCOMTR-MCNC: 81 MG/DL (ref 70–99)
GLUCOSE SERPL-MCNC: 280 MG/DL (ref 70–99)
HCT VFR BLD AUTO: 34.3 % (ref 40–53)
HGB BLD-MCNC: 10.7 G/DL (ref 13.3–17.7)
MCH RBC QN AUTO: 27.2 PG (ref 26.5–33)
MCHC RBC AUTO-ENTMCNC: 31.2 G/DL (ref 31.5–36.5)
MCV RBC AUTO: 87 FL (ref 78–100)
PLATELET # BLD AUTO: 617 10E9/L (ref 150–450)
POTASSIUM SERPL-SCNC: 3.8 MMOL/L (ref 3.4–5.3)
RBC # BLD AUTO: 3.94 10E12/L (ref 4.4–5.9)
SODIUM SERPL-SCNC: 135 MMOL/L (ref 133–144)
WBC # BLD AUTO: 13.6 10E9/L (ref 4–11)

## 2020-07-14 PROCEDURE — 12000000 ZZH R&B MED SURG/OB

## 2020-07-14 PROCEDURE — 97110 THERAPEUTIC EXERCISES: CPT | Mod: GP | Performed by: PHYSICAL THERAPIST

## 2020-07-14 PROCEDURE — 25000132 ZZH RX MED GY IP 250 OP 250 PS 637: Performed by: INTERNAL MEDICINE

## 2020-07-14 PROCEDURE — 99232 SBSQ HOSP IP/OBS MODERATE 35: CPT | Performed by: INTERNAL MEDICINE

## 2020-07-14 PROCEDURE — 25000131 ZZH RX MED GY IP 250 OP 636 PS 637: Performed by: INTERNAL MEDICINE

## 2020-07-14 PROCEDURE — 36415 COLL VENOUS BLD VENIPUNCTURE: CPT | Performed by: INTERNAL MEDICINE

## 2020-07-14 PROCEDURE — 25000128 H RX IP 250 OP 636: Performed by: INTERNAL MEDICINE

## 2020-07-14 PROCEDURE — 85027 COMPLETE CBC AUTOMATED: CPT | Performed by: INTERNAL MEDICINE

## 2020-07-14 PROCEDURE — 80048 BASIC METABOLIC PNL TOTAL CA: CPT | Performed by: INTERNAL MEDICINE

## 2020-07-14 PROCEDURE — 97116 GAIT TRAINING THERAPY: CPT | Mod: GP | Performed by: PHYSICAL THERAPIST

## 2020-07-14 PROCEDURE — 00000146 ZZHCL STATISTIC GLUCOSE BY METER IP

## 2020-07-14 RX ORDER — CEFAZOLIN SODIUM 2 G/100ML
2 INJECTION, SOLUTION INTRAVENOUS EVERY 12 HOURS
Status: DISCONTINUED | OUTPATIENT
Start: 2020-07-14 | End: 2020-07-17

## 2020-07-14 RX ORDER — LIDOCAINE 40 MG/G
CREAM TOPICAL
Status: DISCONTINUED | OUTPATIENT
Start: 2020-07-14 | End: 2020-07-14

## 2020-07-14 RX ADMIN — VERAPAMIL HYDROCHLORIDE 240 MG: 240 TABLET, FILM COATED, EXTENDED RELEASE ORAL at 21:39

## 2020-07-14 RX ADMIN — CEFAZOLIN SODIUM 2 G: 2 INJECTION, SOLUTION INTRAVENOUS at 11:04

## 2020-07-14 RX ADMIN — POTASSIUM CHLORIDE 10 MEQ: 750 TABLET, EXTENDED RELEASE ORAL at 08:49

## 2020-07-14 RX ADMIN — PIPERACILLIN SODIUM AND TAZOBACTAM SODIUM 3.38 G: 3; .375 INJECTION, POWDER, LYOPHILIZED, FOR SOLUTION INTRAVENOUS at 03:36

## 2020-07-14 RX ADMIN — POTASSIUM CHLORIDE 10 MEQ: 750 TABLET, EXTENDED RELEASE ORAL at 20:37

## 2020-07-14 RX ADMIN — INSULIN ASPART 4 UNITS: 100 INJECTION, SOLUTION INTRAVENOUS; SUBCUTANEOUS at 17:54

## 2020-07-14 RX ADMIN — INSULIN ASPART 4 UNITS: 100 INJECTION, SOLUTION INTRAVENOUS; SUBCUTANEOUS at 08:50

## 2020-07-14 RX ADMIN — FUROSEMIDE 40 MG: 40 TABLET ORAL at 08:49

## 2020-07-14 RX ADMIN — INSULIN ASPART 8 UNITS: 100 INJECTION, SOLUTION INTRAVENOUS; SUBCUTANEOUS at 11:47

## 2020-07-14 RX ADMIN — APIXABAN 2.5 MG: 2.5 TABLET, FILM COATED ORAL at 20:37

## 2020-07-14 RX ADMIN — LOSARTAN POTASSIUM 25 MG: 25 TABLET, FILM COATED ORAL at 08:50

## 2020-07-14 RX ADMIN — INSULIN GLARGINE 14 UNITS: 100 INJECTION, SOLUTION SUBCUTANEOUS at 09:29

## 2020-07-14 RX ADMIN — CEFAZOLIN SODIUM 2 G: 2 INJECTION, SOLUTION INTRAVENOUS at 20:36

## 2020-07-14 RX ADMIN — INSULIN ASPART 4 UNITS: 100 INJECTION, SOLUTION INTRAVENOUS; SUBCUTANEOUS at 12:59

## 2020-07-14 RX ADMIN — PIPERACILLIN SODIUM AND TAZOBACTAM SODIUM 3.38 G: 3; .375 INJECTION, POWDER, LYOPHILIZED, FOR SOLUTION INTRAVENOUS at 08:49

## 2020-07-14 RX ADMIN — FUROSEMIDE 40 MG: 40 TABLET ORAL at 20:37

## 2020-07-14 RX ADMIN — PRAVASTATIN SODIUM 20 MG: 20 TABLET ORAL at 08:49

## 2020-07-14 RX ADMIN — APIXABAN 2.5 MG: 2.5 TABLET, FILM COATED ORAL at 11:47

## 2020-07-14 RX ADMIN — UMECLIDINIUM 1 PUFF: 62.5 AEROSOL, POWDER ORAL at 08:50

## 2020-07-14 ASSESSMENT — ACTIVITIES OF DAILY LIVING (ADL)
ADLS_ACUITY_SCORE: 13

## 2020-07-14 ASSESSMENT — MIFFLIN-ST. JEOR: SCORE: 1409.38

## 2020-07-14 NOTE — CONSULTS
Care Transition Initial Assessment - RN        Met with: Patient.  DATA   Active Problems:    SOB (shortness of breath)       Cognitive Status: awake, alert and oriented.        Contact information and PCP information verified: Yes  Lives With: spouse   Living Arrangements: house 17 stairs with a double railings      Insurance concerns: No Insurance issues identified  ASSESSMENT  Patient currently receives the following services:  none       Identified issues/concerns regarding health management: informed by ID that will need approximately 2 weeks IV antibiotics at discharge.     Lives with wife, supportive, patient states able to help as needed.   PLAN  Financial costs for the patient include per insurance coverage . Referral sent to [facility] to review coverage for home IV antibiotics.   Patient given options and choices for discharge yes,  .  Patient/family is agreeable to the plan?  Yes:   Patient anticipates discharging to home.     Patient anticipates needs for home equipment: Yes, home infusion supplies  Transportation/person available to transport on day of discharge  is did not ask and have they been notified/set up no  Plan/Disposition: Home   Appointments: see AVS      Care  (CTS) will continue to follow as needed.      Josefina Garzon RN   [facility]   Phone [phone]

## 2020-07-14 NOTE — PLAN OF CARE
VSS on RA, A/O x4. Lung sounds diminished in the bases, crackles on the left, BS active. Pigtail CT to -20 suction, no air leak. Patient denied pain. Voiding adequately. Up with SBA + GB. Tolerating a mod carb diet. Plan for midline placement for extended IV abx treatment.

## 2020-07-14 NOTE — PLAN OF CARE
"Discharge Planner PT   Patient plan for discharge: Home.  Current status: Sup<>sit and sit<>stand SBA for CT line management only. Ambulated 175' with no AD, CGA at gait belt. Some path deviation, but no overt LOB. Able to converse throughout. Performed standing ex as well.  Activity tolerance and strength are improving.  Barriers to return to prior living situation: None based on functional mobility.   Recommendations for discharge: Home with OP PT    Rationale for recommendations: Pt will benefit from continued skilled rehab services, in order to continue to progress activity tolerance, strength and higher level balance. Pt reports he has had \"lots of home therapy,\" and \"I'm not sure I need it.\" Pt is close to IP PT goal attainment.        Entered by: Maile Zuluaga 07/14/2020 3:31 PM       "

## 2020-07-14 NOTE — PLAN OF CARE
A&O x4. VSS on RA.. CMS intact. Lungs diminished. BS+, BM+, flatus+. Chest tube to continuous suction, serous output. Tolerating mod carb diet. Denies N/V. Voiding adequately. Denies pain. Up w/1 and GB.

## 2020-07-14 NOTE — PROGRESS NOTES
Therapy: IV ABX  Insurance: [employer]   Ded: $  Met: $    Co-Insurance: 80%  Max Out of Pocket: $3000  Met: $1655    Please contact Intake with any questions, [phone] or In Basket pool, [facility] ([phone]). In reference to admission date 7/10/2020 to check IV ABX coverage

## 2020-07-14 NOTE — PROGRESS NOTES
"Interventional Radiology Progress Note:  Inpatient at [facility]  Date: 2020   Patient name: Tc Garcia  MRN:[id number]  :  1940    History: 79 yo male with history of recurrent left pleural effusions and placement of tunneled PleurX line to left pleural space on .  This was functioning well until last week when the patient was seen in the ED for shortness of breath.  The line was connected to vacuum bottle at that time and did drain a serous fluid.  No line adjustments were made.  Re-admission for shortness of breath and fever, now with staph empyema.    Interval History: Tunneled chest tube removed, placemnt of left chest tube non tunneled, pt reports breathing well and no pain with coughing    Physical Exam:   Vitals: /72 (BP Location: Left arm)   Pulse 62   Temp 97.8  F (36.6  C) (Oral)   Resp 16   Ht 1.753 m (5' 9\")   Wt 71.4 kg (157 lb 6.5 oz)   SpO2 98%   BMI 23.25 kg/m     General: Stable. In no acute distress.  Neuro: A&O x 3. Moves all extremities equally.  Resp: Lungs clear to auscultation bilaterally.  Cardio: S1S2 and reg, without murmur, clicks or rubs  Abdomen: Soft, non-distended, non-tender, positive bowel sounds.  Skin: Without excoriations, ecchymosis, erythema, lesions or open sores.    Drain: Left chest tube Dressing is C/D/I. Tube is draining yellow serous fluid with some fibrous material.   Output by Drain (mL) 20 07 - 20 1459 20 1500 - 20 2259 20 2300 - 20 0659 20 0700 - 20 1459 20 1500 - 20 2259 20 2300 - 20 0659 20 0700 - 20 0858   Requested LDAs do not have output data documented.        Labs:  Recent Labs   Lab 20  0755 20  0806 07/10/20  2113   HGB 10.7* 10.9* 10.8*   WBC 13.6* 17.5* 28.7*     Recent Labs   Lab 20  0755 20  0806 07/10/20  2113   CR 1.72* 1.64* 1.68*      Recent Labs   Lab 20  0600 07/10/20  2113 "   PROTTOTAL 5.4* 5.9*   ALBUMIN  --  2.5*   BILITOTAL  --  0.9   ALKPHOS  --  98   AST  --  12   ALT  --  13       Cultures:  Recent Labs   Lab 07/11/20  1500 07/10/20  2302 07/10/20  2221   CULT Moderate growth  Staphylococcus aureus  *  Critical Value/Significant Value, preliminary result only, called to and read back by  Alexandra Washburn RN 7.12.20 1357. ALEX   No growth after 3 days No growth after 3 days       Assessment: s/p left chest tube placemetn for empyema    Plan: Continue chest tube to suction, may need to add flushes or tPA through tube if output slows and effusion re-accumulates as there is some fibrous material in-line.  Not good surgical candidate at this time.    15 minutes were spent with patient during today's visit with greater than 50% of the time spent face to face with the patient, in reviewing medical record and images and in counseling and coordinating patient's care.    Gilberto Nixon PA-C  Interventional Radiology

## 2020-07-14 NOTE — PROGRESS NOTES
"[facility]  Hospitalist Progress Note  Nicolás Moreno MD  07/14/2020    Assessment & Plan  Interval History      Summary of Stay: Tc Garcia is a 80 year old male who was admitted on 7/10/2020.  He has had a persistent left pleural effusion with multiple admissions.  He was recently discharged and returned with more pulmonary complaints.  There was a concern of possible infection of the fluid.  Pulmonology was consulted and his pleux catheter was removed and a chest tube placed with fluid studies sent.  The fluid findings suggest infection.  Patient tested covid negative this admission and recently prior to this admission.        Assessment & Plan      Recurrent left pleural effusion with studies suggestive of empyema:  -  Chest tube, will defer management and consideration of lytics to pulmonology, discussed with Dr Rodriguez who felt surgery as a \"last resort\" and continued treatment with pleurx cathter and lytics.  -  noted change to ancef, appreciate ID consultation  -  Not requiring oxygen currently, monitor  - Thoracic surgery and pulmonary consulted and discussed with Berry Hunt and Ale.  - C-T drainage is decreasing dramatically 800 > 500 > 100 ml output.     - plan for IV ancef for at least 2 weeks, mid-line order placed.     DM:  -  Some low glucoses when NPO for procedures 7/11.  He then developed hyperglycemia when diet resumed as he had less than his usual insulin.   - Current plan with lantus + meal insulin + low ssi changed to high resistance sliding scale insulin and mealtime dose increased from 4 unit(s) to 7 unit(s)   - daytime BG better controlled but still not optimal     CKD:  - Monitor, avoid nephrotoxic meds as able.  - On chronic potassium and lasix.   - creat 1.5 - 1.7 which is his baseline     Parox afib on chronic anticoag with apixaban:  - Apixaban on hold with recent procedures.  Consider resuming tomorrow if acceptable with pulmonology and no further immediate " "procedures needed.  - Continue verapamil  - restarted apixiban 2.5 mg bid on 7/14     HTN:  -  Controlled, Continue cozaar     Hyperlipidemia:  -  Continue lovastatin     COPD without an acute exacerbation:  -  Incruse Ellipta           COVID Status:               COVID-19 PCR Results    COVID-19 PCR Results 4/25/20 4/25/20 5/10/20 5/10/20 5/21/20 5/21/20 7/8/20 7/8/20 7/10/20 7/10/20     1450 1450 2207 2207 1403 1403 1603 1603 2348 2348   COVID-19 Virus PCR to [facility] - Result Test received-See reflex to IDDL test SARS CoV2 (COVID-19) Virus RT-PCR   Test received-See reflex to IDDL test SARS CoV2 (COVID-19) Virus RT-PCR   Test received-See reflex to IDDL test SARS CoV2 (COVID-19) Virus RT-PCR   Test received-See reflex to IDDL test SARS CoV2 (COVID-19) Virus RT-PCR   Test received-See reflex to IDDL test SARS CoV2 (COVID-19) Virus RT-PCR     COVID-19 Virus PCR to [facility] - Source Nasopharyngeal   Nasopharyngeal (C)   Nasopharyngeal   Nasopharyngeal   Nasopharyngeal     SARS-CoV-2 Virus Specimen Source   Nasopharyngeal   Nasopharyngeal   Nasopharyngeal   Nasopharyngeal   Nasopharyngeal   SARS-CoV-2 PCR Result   NEGATIVE   NEGATIVE   NEGATIVE   NEGATIVE   NEGATIVE   (C) Corrected value        Comments are available for some flowsheets but are not being displayed.           COVID-19 Antibody Results, Testing for Immunity    COVID-19 Antibody Results, Testing for Immunity   No data to display.              Diet: Regular Diet Adult    DVT Prophylaxis: Pneumatic Compression Devices  Pruett Catheter: not present  Code Status: Full Code         -- no acute overnight events  -- discussed with Ale Downing and ID consult appreciated    -Data reviewed today: I reviewed all new labs and imaging over the last 24 hours. I personally reviewed no images or EKG's today.    Physical Exam   Heart Rate: 61, Blood pressure 116/69, pulse 62, temperature 97.4  F (36.3  C), temperature source Oral, resp. rate 16, height 1.753 m (5' 9\"), " weight 71.4 kg (157 lb 6.5 oz), SpO2 100 %.  Vitals:    07/11/20 0134 07/12/20 0636 07/14/20 0600   Weight: 69.7 kg (153 lb 9.6 oz) 68.5 kg (151 lb 0.2 oz) 71.4 kg (157 lb 6.5 oz)     Vital Signs with Ranges  Temp:  [96.6  F (35.9  C)-98.4  F (36.9  C)] 97.4  F (36.3  C)  Pulse:  [59-85] 62  Heart Rate:  [61] 61  Resp:  [14-16] 16  BP: (105-131)/(51-87) 116/69  SpO2:  [95 %-100 %] 100 %  I/O's Last 24 hours  I/O last 3 completed shifts:  In: 2323 [P.O.:2320; I.V.:3]  Out: 1000 [Urine:900; Chest Tube:100]    Constitutional: Awake, alert, cooperative, no apparent distress  Respiratory: Diminished left lung base  Cardiovascular:  irregular  GI: Normal bowel sounds, soft, non-distended, non-tender  Skin/Integumen: No rashes, no cyanosis, no edema  Other:      Medications   All medications were reviewed.    - MEDICATION INSTRUCTIONS -         apixaban ANTICOAGULANT  2.5 mg Oral BID     ceFAZolin  2 g Intravenous Q12H     furosemide  40 mg Oral BID     insulin aspart  1-10 Units Subcutaneous TID AC     insulin aspart  1-7 Units Subcutaneous At Bedtime     insulin aspart  4 Units Subcutaneous TID w/meals     insulin glargine  14 Units Subcutaneous QAM     losartan  25 mg Oral Daily     potassium chloride ER  10 mEq Oral BID     pravastatin  20 mg Oral Daily     sodium chloride (PF)  10 mL Intracatheter Q8H     sodium chloride (PF)  3 mL Intracatheter Q8H     umeclidinium  1 puff Inhalation Daily     verapamil ER  240 mg Oral At Bedtime        Data   Recent Labs   Lab 07/14/20  0755 07/12/20  0806 07/11/20  0600 07/10/20  2113  07/08/20  1223   WBC 13.6* 17.5*  --  28.7*   < > 15.1*   HGB 10.7* 10.9*  --  10.8*   < > 10.7*   MCV 87 88  --  87   < > 88   * 563*  --  496*   < > 506*    133  --  135   < >  --    POTASSIUM 3.8 4.4  --  4.2   < >  --    CHLORIDE 100 99  --  100   < >  --    CO2 28 22  --  31   < >  --    BUN 29 36*  --  27   < >  --    CR 1.72* 1.64*  --  1.68*   < >  --    ANIONGAP 7 12  --  4   <  >  --    LLOYD 8.3* 8.8  --  9.1   < >  --    * 409*  --  125*   < >  --    ALBUMIN  --   --   --  2.5*  --   --    PROTTOTAL  --   --  5.4* 5.9*  --   --    BILITOTAL  --   --   --  0.9  --   --    ALKPHOS  --   --   --  98  --   --    ALT  --   --   --  13  --   --    AST  --   --   --  12  --   --    TROPI  --   --   --   --   --  <0.015    < > = values in this interval not displayed.       No results found for this or any previous visit (from the past 24 hour(s)).    Nicolás Moreno MD  Text Page  (7am to 6pm)

## 2020-07-14 NOTE — PROGRESS NOTES
"PULMONOLOGY PROGRESS NOTE    Date of Admission: 7/10/2020    CC/Reason for Hospital visit:  empyema  SUBJECTIVE      Events of last night reviewed. Stable night. No new respiratory problems. States breathing is better today, no cough or chest pain. Chest tube drained 510 ml 2 days ago and 380 ml yesterday. IR note reviewed.    ROS: A Problem Pertinent review of systems was negative except for items noted in HPI.  Past Medical, Family, and Social/Substance History has been reviewed: No interval changes.     OBJECTIVE   Vital signs:  Temp: 96.6  F (35.9  C) Temp src: Oral BP: 117/67 Pulse: 59   Resp: 16 SpO2: 98 % O2 Device: None (Room air) Oxygen Delivery: 2 LPM Height: 175.3 cm (5' 9\") Weight: 71.4 kg (157 lb 6.5 oz)  Estimated body mass index is 23.25 kg/m  as calculated from the following:    Height as of this encounter: 1.753 m (5' 9\").    Weight as of this encounter: 71.4 kg (157 lb 6.5 oz).        I/O last 3 completed shifts:  In: 2183 [P.O.:2180; I.V.:3]  Out: 1125 [Urine:1025; Chest Tube:100]      CONSTITUTIONAL/GENERAL APPEARANCE: Alert male. No Apparent Distress.  PSYCHIATRIC: Pleasant and appropriate mood and affect. Oriented x 3.  EARS, NOSE,THROAT,MOUTH: External ears and nose overall normal. Normal oral mucosa.   NECK: Neck appearance normal. No neck masses and the thyroid is not enlarged.   RESPIRATORY: Non-labored effort. Decreased BS left base, right lung clear.  CARDIOVASCULAR: S1, S2, regular rate and rhythm.      LABORATORY ASSESSMENT    Arterial Blood GasNo lab results found in last 7 days.  CBC  Recent Labs   Lab 07/14/20  0755 07/12/20  0806 07/10/20  2113 07/09/20  0549   WBC 13.6* 17.5* 28.7* 21.0*   RBC 3.94* 3.99* 3.88* 3.72*   HGB 10.7* 10.9* 10.8* 10.2*   HCT 34.3* 35.2* 33.6* 32.3*   MCV 87 88 87 87   MCH 27.2 27.3 27.8 27.4   MCHC 31.2* 31.0* 32.1 31.6   RDW 16.8* 17.1* 17.2* 17.7*   * 563* 496* 444     [facility]  Recent Labs   Lab 07/14/20  0755 07/12/20  0806 07/10/20  2112 " 07/10/20  0555    133 135 134   POTASSIUM 3.8 4.4 4.2 3.3*   CHLORIDE 100 99 100 98   LLOYD 8.3* 8.8 9.1 8.2*   CO2 28 22 31 30   BUN 29 36* 27 24   CR 1.72* 1.64* 1.68* 1.51*   * 409* 125* 186*     INRNo lab results found in last 7 days.   BNP  Recent Labs   Lab 07/08/20  0944   NTBNP 2,210*     VENOUS BLOOD GASESNo lab results found in last 7 days.      Additional labs and/or comments:  Leukocytosis improved.    Pleural fluid - MSSA    IMAGING      CXR 7/12 -  IMPRESSION: New pleural catheter noted on the left. Improved left  effusion. Question some ex vacuo pleural air at the left base. Midlung  infiltrate unchanged. Right lung clear.    CT Chest 7/11 -  IMPRESSION: Large loculated left pleural effusion with pleural  catheter in place. Question whether the catheter is occluded or  excluded from the fluid due to loculation.    PFT & OTHER TESTING       ASSESSMENT / PLAN      Pulmonary Diagnoses:  COPD J44.9  Empyema J86.9  Pleural effusion  Pulm HTN second I27.2  SOB R06.02       ASSESSMENT :  80-year-old male former smoker with multiple medical problems including COPD, congestive heart failure, pulmonary hypertension, atrial fibrillation, chronic kidney disease and recurrent transudative left pleural effusion is admitted with weakness, leukocytosis and worsening pleural effusion.  Patient had been followed in our clinic by Dr. Gonzalez and was last seen by him April 2020.  He was hospitalized at [facility] 5/10-5/14/2020 for acute hypoxemic respiratory failure and recurrent left pleural effusion requiring 6 thoracenteses since December 2019.  He underwent Pleurx catheter placement on 5/12/2020.  He was readmitted to [facility] from 5/21-5/29/2020 for DKA.  CT scan of the chest 5/22/2020 showed the left pleural effusion had nearly completely resolved; there were residual areas of rounded atelectasis and scarring in the left lung base.  Extensive groundglass infiltrates in the right upper  lobe seen on the prior study had nearly completely resolved.  I saw the patient once via telemedicine on 6/5/2020 at which time he reported that he was draining his chest tube every other day and that the drainage volume had ranged between 650 mL and 800 mL.  He had been maintained on Spiriva Respimat but had run out of his inhaler.  He was instructed to continue with his present drainage program.  He contacted our office on 7/1/2020 reporting that that his diuretics had been increased and that since 6/8/2020 he had had no additional drainage.  Chest x-ray was ordered for 7/8/2020 at the time of his follow-up cardiology visit, and this showed a small left pneumothorax and small to moderate sized left pleural effusion.  The patient was admitted from 7/8-7/10/2020 for further diuresis; he was seen by IR and the chest tube was connected to suction with removal of 550 mL of fluid.  He was readmitted later that evening for weakness, fever and leukocytosis.  CT scan of the chest 7/11/2020 showed a large loculated left pleural effusion with his Pleurx catheter in place.  Pleural fluid labs were consistent with an empyema, and cultures grew MSSA.  IR subsequently remove the Pleurx catheter and placed a new left-sided chest tube which has continued to drain.  Patient is now clinically improved, with resolving leukocytosis.  Respiratory status remained stable on room air.    PLAN:  1. Bronchodilators - Incruse  2. Antibiotics - Ancef, ID consult pending.  3. Diuresis - Lasix as ordered.  4. Continue chest tube to suction.  5. Recheck CXR in AM.    Case discussed with Dr. Moreno.      Philip Aguilera M.D.    [facility]  [phone]   (pager)  [phone]   (office)

## 2020-07-14 NOTE — PLAN OF CARE
Pt is A+Ox4, VSS. Pt has diminished lung sounds on the left and clear on the right. Passing gas. Declined prn pain medication overnight. Pt had low blood sugar overnight, snacks provided and recheck came back at 190. Voiding adequately. Serous output from chest tube. Mod carb diet. Up with assist of 1.

## 2020-07-14 NOTE — CONSULTS
Consult Date:  07/14/2020      INFECTIOUS DISEASE CONSULTATION      LOCATION:  Room 304, [facility].      REFERRING PHYSICIAN:  Nicolás Moreno MD      IMPRESSION:   1.  An 80-year-old male, known to me with persistent left pleural effusion with a PleurX tube in place, now admitted with some worsening symptoms, clean aspirate from the pleural fluid looked infected and now culture growing Staph aureus.  No major clinical sepsis.  Blood cultures so far negative.   2.  Chronic left pleural effusion, previously not infected, but now appears to have secondary infection, question migration through PleurX tube.   3.  Diabetes mellitus.   4.  Chronic kidney insufficiency.   5.  Paroxysmal atrial fibrillation.   6.  Chronic obstructive pulmonary disease.      RECOMMENDATIONS:   1.  Simplify to Ancef, pansensitive staph.   2.  Appears to have true empyema, now with chest tube drainage occurring.  The exact plan with this is unclear.   3.  Definitely needs extended antibiotics for this, likely at least some of it IV.  Start looking into options for this.  At least 2 weeks IV, then probably extended oral following that.  Staph aureus is a difficult pleural infection organism without better drainage, hopefully tube drainage will be adequate.      HISTORY OF PRESENT ILLNESS:  This 80-year-old male is seen in consultation.  He is well known to me from previously.  He had hospitalization in May, at which time he was found to have persistent and worsening left pleural effusion.  There was initial concern it might be infection, but the pleural fluid was not compatible and the cultures were negative.  We did minimal antibiotics at that time.  He has not really had any signs of infection since that time, has had ongoing PleurX drainage catheter in place.  He is readmitted now with some worsening symptoms including signs of possible systemic infection.  Fluid looked more loculated and eventual aspiration of that was  accomplished, that looked significantly worse than previously and the culture is growing Staph aureus.      PAST MEDICAL HISTORY:  Multiple medical problems, paroxysmal atrial fibrillation, history of chronic mild kidney disease, history of recurrent and chronic left pleural effusion, diabetes mellitus.      SOCIAL AND FAMILY HISTORY:  Extensive health care contact.  No known resistant pathogens.  Current Staph is sensitive.      MEDICATIONS:  As listed.      REVIEW OF SYSTEMS:  Some chest discomfort with the tube is.  He actually feels relatively well.  No other focal or localizing symptoms, not really having major respiratory symptoms.  No other pain site.      PHYSICAL EXAMINATION:   GENERAL:  The patient appears her stated age, looks chronically more than acutely ill.  He is a good historian.   VITAL SIGNS:  Currently normal, including being afebrile.   HEENT:  No thrush or intraoral lesions.   NECK:  Supple and nontender.   HEART:  Grade 1/6 systolic murmur.  No other abnormality.   LUNGS:  Crackles and, of course, now chest tube sounds on the left.   ABDOMEN:  Nontender.   EXTREMITIES:  Some edema.  No major skin lesions.      LABORATORY DATA:  Culture growing sensitive Staph aureus from the pleural fluid that looked infected by clinical criteria.  Creatinine 1.72, slightly up from his usual baseline.  White count initially 15,100, marina to 28,700, now down to 13,000 on antibiotics.  Blood cultures were done on 07/10 and remain negative Gram stain also positive on the pleural fluid for gram-positive cocci compatible with the known organism.  This was an aspirate culture not from the PleurX tube.      Thank you very much for the consultation.  I will follow the patient with you.         CHEPE ASHTON MD             D: 2020   T: 2020   MT: SALINAS      Name:     CEDRICK PHILLIPS   MRN:      [id number]        Account:       [id number]   :      1940           Consult Date:  2020       Document: [id number]

## 2020-07-14 NOTE — CONSULTS
ID consult dictated IMP 1 79 yo male chronic uninfected L pleural effusion, pleurax cath, now fver not bacteremic, clean asp MSSA so has infected fluis    REcEsp with tube drainage only this will need extended IV., simplify to ancf, ? Tube plan, soc sx help, midline any time

## 2020-07-15 ENCOUNTER — APPOINTMENT (OUTPATIENT)
Dept: PHYSICAL THERAPY | Facility: CLINIC | Age: 80
DRG: 178 | End: 2020-07-15
Payer: COMMERCIAL

## 2020-07-15 ENCOUNTER — APPOINTMENT (OUTPATIENT)
Dept: GENERAL RADIOLOGY | Facility: CLINIC | Age: 80
DRG: 178 | End: 2020-07-15
Attending: INTERNAL MEDICINE
Payer: COMMERCIAL

## 2020-07-15 ENCOUNTER — APPOINTMENT (OUTPATIENT)
Dept: CT IMAGING | Facility: CLINIC | Age: 80
DRG: 178 | End: 2020-07-15
Attending: INTERNAL MEDICINE
Payer: COMMERCIAL

## 2020-07-15 LAB
ANION GAP SERPL CALCULATED.3IONS-SCNC: 6 MMOL/L (ref 3–14)
BUN SERPL-MCNC: 21 MG/DL (ref 7–30)
CALCIUM SERPL-MCNC: 8.2 MG/DL (ref 8.5–10.1)
CHLORIDE SERPL-SCNC: 103 MMOL/L (ref 94–109)
CO2 SERPL-SCNC: 29 MMOL/L (ref 20–32)
CREAT SERPL-MCNC: 1.78 MG/DL (ref 0.66–1.25)
CRP SERPL-MCNC: 67.3 MG/L (ref 0–8)
ERYTHROCYTE [DISTWIDTH] IN BLOOD BY AUTOMATED COUNT: 17 % (ref 10–15)
ERYTHROCYTE [SEDIMENTATION RATE] IN BLOOD BY WESTERGREN METHOD: 53 MM/H (ref 0–20)
GFR SERPL CREATININE-BSD FRML MDRD: 35 ML/MIN/{1.73_M2}
GLUCOSE BLDC GLUCOMTR-MCNC: 102 MG/DL (ref 70–99)
GLUCOSE BLDC GLUCOMTR-MCNC: 118 MG/DL (ref 70–99)
GLUCOSE BLDC GLUCOMTR-MCNC: 131 MG/DL (ref 70–99)
GLUCOSE BLDC GLUCOMTR-MCNC: 144 MG/DL (ref 70–99)
GLUCOSE BLDC GLUCOMTR-MCNC: 185 MG/DL (ref 70–99)
GLUCOSE BLDC GLUCOMTR-MCNC: 243 MG/DL (ref 70–99)
GLUCOSE SERPL-MCNC: 162 MG/DL (ref 70–99)
HCT VFR BLD AUTO: 34.1 % (ref 40–53)
HGB BLD-MCNC: 10.6 G/DL (ref 13.3–17.7)
MCH RBC QN AUTO: 27.2 PG (ref 26.5–33)
MCHC RBC AUTO-ENTMCNC: 31.1 G/DL (ref 31.5–36.5)
MCV RBC AUTO: 87 FL (ref 78–100)
PLATELET # BLD AUTO: 586 10E9/L (ref 150–450)
POTASSIUM SERPL-SCNC: 4.2 MMOL/L (ref 3.4–5.3)
RBC # BLD AUTO: 3.9 10E12/L (ref 4.4–5.9)
SODIUM SERPL-SCNC: 138 MMOL/L (ref 133–144)
WBC # BLD AUTO: 15.6 10E9/L (ref 4–11)

## 2020-07-15 PROCEDURE — 36569 INSJ PICC 5 YR+ W/O IMAGING: CPT

## 2020-07-15 PROCEDURE — 27211406 ZZ H KIT CATH IV 18 OR 20G CM, POWERGLIDE W MAX BARRIER

## 2020-07-15 PROCEDURE — 97116 GAIT TRAINING THERAPY: CPT | Mod: GP | Performed by: PHYSICAL THERAPY ASSISTANT

## 2020-07-15 PROCEDURE — 85652 RBC SED RATE AUTOMATED: CPT | Performed by: INTERNAL MEDICINE

## 2020-07-15 PROCEDURE — 71045 X-RAY EXAM CHEST 1 VIEW: CPT

## 2020-07-15 PROCEDURE — 40000257 ZZH STATISTIC CONSULT NO CHARGE VASC ACCESS

## 2020-07-15 PROCEDURE — 25000128 H RX IP 250 OP 636: Performed by: INTERNAL MEDICINE

## 2020-07-15 PROCEDURE — 71250 CT THORAX DX C-: CPT

## 2020-07-15 PROCEDURE — 25000132 ZZH RX MED GY IP 250 OP 250 PS 637: Performed by: INTERNAL MEDICINE

## 2020-07-15 PROCEDURE — 25000125 ZZHC RX 250: Performed by: INTERNAL MEDICINE

## 2020-07-15 PROCEDURE — 99232 SBSQ HOSP IP/OBS MODERATE 35: CPT | Performed by: INTERNAL MEDICINE

## 2020-07-15 PROCEDURE — 00000146 ZZHCL STATISTIC GLUCOSE BY METER IP

## 2020-07-15 PROCEDURE — 25000131 ZZH RX MED GY IP 250 OP 636 PS 637: Performed by: INTERNAL MEDICINE

## 2020-07-15 PROCEDURE — 86140 C-REACTIVE PROTEIN: CPT | Performed by: INTERNAL MEDICINE

## 2020-07-15 PROCEDURE — 3E0L3GC INTRODUCTION OF OTHER THERAPEUTIC SUBSTANCE INTO PLEURAL CAVITY, PERCUTANEOUS APPROACH: ICD-10-PCS | Performed by: PHYSICIAN ASSISTANT

## 2020-07-15 PROCEDURE — 97110 THERAPEUTIC EXERCISES: CPT | Mod: GP | Performed by: PHYSICAL THERAPY ASSISTANT

## 2020-07-15 PROCEDURE — 80048 BASIC METABOLIC PNL TOTAL CA: CPT | Performed by: INTERNAL MEDICINE

## 2020-07-15 PROCEDURE — 12000000 ZZH R&B MED SURG/OB

## 2020-07-15 PROCEDURE — 85027 COMPLETE CBC AUTOMATED: CPT | Performed by: INTERNAL MEDICINE

## 2020-07-15 PROCEDURE — 36415 COLL VENOUS BLD VENIPUNCTURE: CPT | Performed by: INTERNAL MEDICINE

## 2020-07-15 RX ORDER — DEXTROSE MONOHYDRATE 25 G/50ML
25-50 INJECTION, SOLUTION INTRAVENOUS
Status: DISCONTINUED | OUTPATIENT
Start: 2020-07-15 | End: 2020-07-16

## 2020-07-15 RX ORDER — NICOTINE POLACRILEX 4 MG
15-30 LOZENGE BUCCAL
Status: DISCONTINUED | OUTPATIENT
Start: 2020-07-15 | End: 2020-07-16

## 2020-07-15 RX ADMIN — CEFAZOLIN SODIUM 2 G: 2 INJECTION, SOLUTION INTRAVENOUS at 22:01

## 2020-07-15 RX ADMIN — POTASSIUM CHLORIDE 10 MEQ: 750 TABLET, EXTENDED RELEASE ORAL at 20:12

## 2020-07-15 RX ADMIN — INSULIN ASPART 5 UNITS: 100 INJECTION, SOLUTION INTRAVENOUS; SUBCUTANEOUS at 12:18

## 2020-07-15 RX ADMIN — INSULIN ASPART 4 UNITS: 100 INJECTION, SOLUTION INTRAVENOUS; SUBCUTANEOUS at 09:30

## 2020-07-15 RX ADMIN — CEFAZOLIN SODIUM 2 G: 2 INJECTION, SOLUTION INTRAVENOUS at 11:09

## 2020-07-15 RX ADMIN — FUROSEMIDE 40 MG: 40 TABLET ORAL at 20:12

## 2020-07-15 RX ADMIN — LIDOCAINE HYDROCHLORIDE 1 ML: 10 INJECTION, SOLUTION EPIDURAL; INFILTRATION; INTRACAUDAL; PERINEURAL at 10:53

## 2020-07-15 RX ADMIN — VERAPAMIL HYDROCHLORIDE 240 MG: 240 TABLET, FILM COATED, EXTENDED RELEASE ORAL at 22:01

## 2020-07-15 RX ADMIN — APIXABAN 2.5 MG: 2.5 TABLET, FILM COATED ORAL at 20:11

## 2020-07-15 RX ADMIN — POTASSIUM CHLORIDE 10 MEQ: 750 TABLET, EXTENDED RELEASE ORAL at 08:01

## 2020-07-15 RX ADMIN — LOSARTAN POTASSIUM 25 MG: 25 TABLET, FILM COATED ORAL at 08:02

## 2020-07-15 RX ADMIN — INSULIN GLARGINE 14 UNITS: 100 INJECTION, SOLUTION SUBCUTANEOUS at 08:02

## 2020-07-15 RX ADMIN — FUROSEMIDE 40 MG: 40 TABLET ORAL at 08:02

## 2020-07-15 RX ADMIN — UMECLIDINIUM 1 PUFF: 62.5 AEROSOL, POWDER ORAL at 08:02

## 2020-07-15 RX ADMIN — APIXABAN 2.5 MG: 2.5 TABLET, FILM COATED ORAL at 08:02

## 2020-07-15 RX ADMIN — PRAVASTATIN SODIUM 20 MG: 20 TABLET ORAL at 08:02

## 2020-07-15 RX ADMIN — INSULIN ASPART 4 UNITS: 100 INJECTION, SOLUTION INTRAVENOUS; SUBCUTANEOUS at 12:18

## 2020-07-15 ASSESSMENT — ACTIVITIES OF DAILY LIVING (ADL)
ADLS_ACUITY_SCORE: 13
ADLS_ACUITY_SCORE: 11

## 2020-07-15 ASSESSMENT — MIFFLIN-ST. JEOR: SCORE: 1406.38

## 2020-07-15 NOTE — PROGRESS NOTES
"[facility]  Hospitalist Progress Note  Nicolás Moreno MD  07/15/2020    Assessment & Plan  Interval History      Summary of Stay: Tc Garcia is a 80 year old male who was admitted on 7/10/2020.  He has had a persistent left pleural effusion with multiple admissions.  He was recently discharged and returned with more pulmonary complaints.  There was a concern of possible infection of the fluid.  Pulmonology was consulted and his pleux catheter was removed and a chest tube placed with fluid studies sent.  The fluid findings suggest infection.  Patient tested covid negative this admission and recently prior to this admission.        Assessment & Plan      Recurrent left pleural effusion with studies suggestive of empyema:  -  Chest tube, will defer management and consideration of lytics to pulmonology, discussed with Dr Rodriguez who felt surgery as a \"last resort\" and continued treatment with pleurx cathter and lytics.  -  noted change to ancef, appreciate ID consultation  -  Not requiring oxygen currently, monitor  - Thoracic surgery and pulmonary consulted and discussed with Berry Hunt and Ale.  - C-T drainage is decreasing dramatically 800 > 500 > 100 ml output, lytics placed and re-eval output and if decreased reposition as per IR  - plan for IV ancef for at least 2 weeks, mid-line order placed.     DM:  -  Some low glucoses when NPO for procedures 7/11.  He then developed hyperglycemia when diet resumed as he had less than his usual insulin.   - Current plan with lantus + meal insulin +  high resistance sliding scale insulin and mealtime dose increased from 4 unit(s).  Due to low late evening BG, discontinued nightime sliding scale, decreased evening meal novolog from 4 to 2 units  - daytime BG better controlled but having evening and nighttime hypoglycemia, will discontinue high intensity SSI     CKD:  - Monitor, avoid nephrotoxic meds as able.  - On chronic potassium and lasix.   - creat " 1.5 - 1.7 which is his baseline     Parox afib on chronic anticoag with apixaban:  - Apixaban previously on hold with recent procedures.   - Continue verapamil  - continue apixiban 2.5 mg bid       HTN:  -  Controlled, Continue cozaar     Hyperlipidemia:  -  Continue lovastatin     COPD without an acute exacerbation:  -  Incruse Ellipta        COVID Status:               COVID-19 PCR Results    COVID-19 PCR Results 4/25/20 4/25/20 5/10/20 5/10/20 5/21/20 5/21/20 7/8/20 7/8/20 7/10/20 7/10/20     1450 1450 2207 2207 1403 1403 1603 1603 2348 2348   COVID-19 Virus PCR to [facility] - Result Test received-See reflex to IDDL test SARS CoV2 (COVID-19) Virus RT-PCR   Test received-See reflex to IDDL test SARS CoV2 (COVID-19) Virus RT-PCR   Test received-See reflex to IDDL test SARS CoV2 (COVID-19) Virus RT-PCR   Test received-See reflex to IDDL test SARS CoV2 (COVID-19) Virus RT-PCR   Test received-See reflex to IDDL test SARS CoV2 (COVID-19) Virus RT-PCR     COVID-19 Virus PCR to [facility] - Source Nasopharyngeal   Nasopharyngeal (C)   Nasopharyngeal   Nasopharyngeal   Nasopharyngeal     SARS-CoV-2 Virus Specimen Source   Nasopharyngeal   Nasopharyngeal   Nasopharyngeal   Nasopharyngeal   Nasopharyngeal   SARS-CoV-2 PCR Result   NEGATIVE   NEGATIVE   NEGATIVE   NEGATIVE   NEGATIVE   (C) Corrected value        Comments are available for some flowsheets but are not being displayed.           COVID-19 Antibody Results, Testing for Immunity    COVID-19 Antibody Results, Testing for Immunity   No data to display.              Diet: Regular Diet Adult    DVT Prophylaxis: Pneumatic Compression Devices  Pruett Catheter: not present  Code Status: Full Code         -- no acute overnight events  -- noted evening hypoglycemia    -Data reviewed today: I reviewed all new labs and imaging over the last 24 hours. I personally reviewed no images or EKG's today.    Physical Exam   Heart Rate: 93, Blood pressure 136/77, pulse 59, temperature 97.4  " F (36.3  C), temperature source Oral, resp. rate 20, height 1.753 m (5' 9\"), weight 71.1 kg (156 lb 12 oz), SpO2 98 %.  Vitals:    07/12/20 0636 07/14/20 0600 07/15/20 0500   Weight: 68.5 kg (151 lb 0.2 oz) 71.4 kg (157 lb 6.5 oz) 71.1 kg (156 lb 12 oz)     Vital Signs with Ranges  Temp:  [97.4  F (36.3  C)-98.3  F (36.8  C)] 97.4  F (36.3  C)  Pulse:  [59-87] 59  Heart Rate:  [93] 93  Resp:  [16-20] 20  BP: (120-146)/(76-93) 136/77  SpO2:  [97 %-100 %] 98 %  I/O's Last 24 hours  I/O last 3 completed shifts:  In: 1900 [P.O.:1900]  Out: 1610 [Urine:1550; Chest Tube:60]    Constitutional: Awake, alert, cooperative, no apparent distress  Respiratory: Diminished left lung base  Cardiovascular:  irregular  GI: Normal bowel sounds, soft, non-distended, non-tender  Skin/Integumen: No rashes, no cyanosis, no edema  Other:      Medications   All medications were reviewed.    - MEDICATION INSTRUCTIONS -         apixaban ANTICOAGULANT  2.5 mg Oral BID     ceFAZolin  2 g Intravenous Q12H     furosemide  40 mg Oral BID     insulin aspart  1-10 Units Subcutaneous TID AC     insulin aspart  1-7 Units Subcutaneous At Bedtime     insulin aspart  4 Units Subcutaneous TID w/meals     insulin glargine  14 Units Subcutaneous QAM     losartan  25 mg Oral Daily     potassium chloride ER  10 mEq Oral BID     pravastatin  20 mg Oral Daily     sodium chloride (PF)  10 mL Intracatheter Q8H     sodium chloride (PF)  3 mL Intracatheter Q8H     umeclidinium  1 puff Inhalation Daily     verapamil ER  240 mg Oral At Bedtime        Data   Recent Labs   Lab 07/15/20  0735 07/14/20  0755 07/12/20  0806 07/11/20  0600 07/10/20  2113   WBC 15.6* 13.6* 17.5*  --  28.7*   HGB 10.6* 10.7* 10.9*  --  10.8*   MCV 87 87 88  --  87   * 617* 563*  --  496*    135 133  --  135   POTASSIUM 4.2 3.8 4.4  --  4.2   CHLORIDE 103 100 99  --  100   CO2 29 28 22  --  31   BUN 21 29 36*  --  27   CR 1.78* 1.72* 1.64*  --  1.68*   ANIONGAP 6 7 12  --  4 "   LLOYD 8.2* 8.3* 8.8  --  9.1   * 280* 409*  --  125*   ALBUMIN  --   --   --   --  2.5*   PROTTOTAL  --   --   --  5.4* 5.9*   BILITOTAL  --   --   --   --  0.9   ALKPHOS  --   --   --   --  98   ALT  --   --   --   --  13   AST  --   --   --   --  12       Recent Results (from the past 24 hour(s))   XR Chest Port 1 View    Narrative    CHEST PORTABLE ONE VIEW 7/15/2020 8:30 AM     HISTORY: Follow up empyema.    COMPARISON: 7/12/2020.      Impression    IMPRESSION: Drainage catheter at the left lower hemithorax is again  seen. Slight decrease in left mid and lower lung zone opacity,  presumably mostly pleural fluid. Some pleural fluid is again seen  extending over the left lung apex. Numerous left-sided rib fractures  posterolaterally are again noted. No pneumothorax. The right lung is  clear. Heart size is difficult to evaluate due to overlying opacity  but is probably unchanged. Vascular calcifications are noted.    WILNER ROQUE MD   CT Chest w/o Contrast    Narrative    CT CHEST WITHOUT CONTRAST 7/15/2020 2:12 PM     HISTORY: Pleural effusion. Staph empyema, reassess loculations and  need for lytic therapy.    COMPARISON: 7/11/2020    TECHNIQUE: Volumetric helical acquisition of CT images of the chest  from the clavicles to the kidneys were acquired without IV contrast.  Radiation dose for this scan was reduced using automated exposure  control, adjustment of the mA and/or kV according to patient size, or  iterative reconstruction technique.    FINDINGS: Moderate loculated hydropneumothorax on the left with a  pleural catheter at the base. Minimal right pleural effusion. Some  associated compressive atelectasis and/or infiltrate bilaterally, left  worse than right. No pericardial effusion. There are extensive  coronary vascular calcifications and/or stents consistent with  coronary artery disease. There are mild atherosclerotic changes of the  visualized aorta and its branches. There is no evidence of  aortic  aneurysm. No acute findings in the visualized upper abdomen.      Impression    IMPRESSION: Moderate loculated left hydropneumothorax is only  minimally improved compared to previous with pleural catheter in  place.    MD Nicolás SMITH MD  Text Page  (7am to 6pm)

## 2020-07-15 NOTE — PLAN OF CARE
A/Ox4. VSS on RA. LS: dim. BS: active, flatus+ BM x2. Up with one. Tolerating mod carb diet. CT to -20 suction, serous drainage. BG 56, 81, 118, & 185, hypoglycemic episode resolved with juice and snack. BLE edema +2, refused elevation. Saline locked. Denies pain. Plan is to discuss plan of care further today with team. Midline placement today. Will continue to monitor.

## 2020-07-15 NOTE — PROGRESS NOTES
[facility]  Infectious Disease Progress Note          Assessment and Plan:   IMPRESSION:   1.  An 80-year-old male, known to me with persistent left pleural effusion with a PleurX tube in place, now admitted with some worsening symptoms, clean aspirate from the pleural fluid looked infected and now culture growing Staph aureus.  No major clinical sepsis.  Blood cultures so far negative.   2.  Chronic left pleural effusion, previously not infected, but now appears to have secondary infection, question migration through PleurX tube.   3.  Diabetes mellitus.   4.  Chronic kidney insufficiency.   5.  Paroxysmal atrial fibrillation.   6.  Chronic obstructive pulmonary disease.      RECOMMENDATIONS:   1.  Continue Ancef, pansensitive staph pure cx.   2.  Appears to have true empyema, now with chest tube drainage occurring.  The exact plan with tube duration unclear.   3.  Definitely needs extended antibiotics for this,  at least some of it IV.  Start looking into options for this.  At least 2 weeks IV, then probably extended oral following that.  Staph aureus is a difficult pleural infection organism without better drainage, hopefully tube drainage will be adequate.   4 BC neg place midline             Interval History:   no new complaints and doing well; no cp, sob, n/v/d, or abd pain. Feels well cxs same BC neg WBC 15 K              Medications:       apixaban ANTICOAGULANT  2.5 mg Oral BID     ceFAZolin  2 g Intravenous Q12H     furosemide  40 mg Oral BID     insulin aspart  1-10 Units Subcutaneous TID AC     insulin aspart  1-7 Units Subcutaneous At Bedtime     insulin aspart  4 Units Subcutaneous TID w/meals     insulin glargine  14 Units Subcutaneous QAM     losartan  25 mg Oral Daily     potassium chloride ER  10 mEq Oral BID     pravastatin  20 mg Oral Daily     sodium chloride (PF)  3 mL Intracatheter Q8H     umeclidinium  1 puff Inhalation Daily     verapamil ER  240 mg Oral At Bedtime  "                 Physical Exam:   Blood pressure 122/78, pulse 63, temperature 97.8  F (36.6  C), temperature source Oral, resp. rate 20, height 1.753 m (5' 9\"), weight 71.1 kg (156 lb 12 oz), SpO2 98 %.  Wt Readings from Last 2 Encounters:   07/15/20 71.1 kg (156 lb 12 oz)   07/10/20 67.5 kg (148 lb 14.4 oz)     Vital Signs with Ranges  Temp:  [96.6  F (35.9  C)-98.3  F (36.8  C)] 97.8  F (36.6  C)  Pulse:  [59-87] 63  Heart Rate:  [61-93] 93  Resp:  [16-20] 20  BP: (116-146)/(67-93) 122/78  SpO2:  [97 %-100 %] 98 %    Constitutional: Awake, alert, cooperative, no apparent distress   Lungs: Clear to auscultation bilaterally, no crackles or wheezing l CT sounds   Cardiovascular: Regular rate and rhythm, normal S1 and S2, and no murmur noted   Abdomen: Normal bowel sounds, soft, non-distended, non-tender   Skin: No rashes, no cyanosis, no edema   Other:           Data:   All microbiology laboratory data reviewed.  Recent Labs   Lab Test 07/15/20  0735 07/14/20  0755 07/12/20  0806   WBC 15.6* 13.6* 17.5*   HGB 10.6* 10.7* 10.9*   HCT 34.1* 34.3* 35.2*   MCV 87 87 88   * 617* 563*     Recent Labs   Lab Test 07/15/20  0735 07/14/20  0755 07/12/20  0806   CR 1.78* 1.72* 1.64*     Recent Labs   Lab Test 07/15/20  0735   SED 53*     Recent Labs   Lab Test 07/11/20  1500 07/10/20  2302 07/10/20  2221 05/21/20  1916 05/21/20  1423 05/12/20  1058 12/31/19  1008 11/07/19  1105 10/31/19  2144   CULT Moderate growth  Staphylococcus aureus  *  Critical Value/Significant Value, preliminary result only, called to and read back by  Alexandra Washburn RN 7.12.20 1357. ALEX   No growth after 4 days No growth after 4 days No growth No growth  No growth No growth No growth 10,000 to 50,000 colonies/mL  Candida albicans / dubliniensis  Candida albicans and Candida dubliniensis are not routinely speciated  Susceptibility testing not routinely done  * No growth       "

## 2020-07-15 NOTE — PROGRESS NOTES
"CT chest with large pleural fluid and loculations.  Will order tPA through chest tube tonight and in the morning.  If no significant increase in output from chest tube, will likely need to reposition tube more medial/inferior to drain remaining effusion.    Interventional Radiology Progress Note:  Inpatient at [facility]  Date: July 15, 2020   Patient name: Tc Garcia  MRN:[id number]  :  1940    History: History: 79 yo male with history of recurrent left pleural effusions and placement of tunneled PleurX line to left pleural space on .  This was functioning well until last week when the patient was seen in the ED for shortness of breath.  The line was connected to vacuum bottle at that time and did drain a serous fluid.  No line adjustments were made.  Re-admission for shortness of breath and fever, now with staph empyema. Tunneled chest tube removed, placemnt of left chest tube non tunneled     Interval History:  Breathing better, no pain at chest tube site    Physical Exam:   Vitals: /78 (BP Location: Left arm)   Pulse 63   Temp 97.8  F (36.6  C) (Oral)   Resp 20   Ht 1.753 m (5' 9\")   Wt 71.1 kg (156 lb 12 oz)   SpO2 98%   BMI 23.15 kg/m     General: Stable. In no acute distress.  Neuro: A&O x 3. Moves all extremities equally.  Abdomen: Soft, non-distended, non-tender  Skin: Without excoriations, ecchymosis, erythema, lesions or open sores.  MSK: No gross motor weakness. Sensation intact. Proprioception intact.    Drain: Left chest tube Dressing is C/D/I. Tube is draining somewhat thick serous fluid. Flushes easiliy up and down stream  Output by Drain (mL) 20 07 - 20 1459 20 1500 - 20 2259 20 2300 - 20 0659 20 07 - 20 1459 20 1500 - 20 2259 20 2300 - 07/15/20 0659 07/15/20 0700 - 07/15/20 1038   Requested LDAs do not have output data documented.        Labs:  Recent Labs   Lab 07/15/20  0735 " 07/14/20  0755 07/12/20  0806   HGB 10.6* 10.7* 10.9*   WBC 15.6* 13.6* 17.5*     Recent Labs   Lab 07/15/20  0735 07/14/20  0755 07/12/20  0806   CR 1.78* 1.72* 1.64*      Recent Labs   Lab 07/11/20  0600 07/10/20  2113   PROTTOTAL 5.4* 5.9*   ALBUMIN  --  2.5*   BILITOTAL  --  0.9   ALKPHOS  --  98   AST  --  12   ALT  --  13       Cultures:  Recent Labs   Lab 07/11/20  1500 07/10/20  2302 07/10/20  2221   CULT Moderate growth  Staphylococcus aureus  *  Critical Value/Significant Value, preliminary result only, called to and read back by  Alexandra Washburn RN 7.12.20 9947. ALEX   No growth after 4 days No growth after 4 days     Recent Results (from the past 24 hour(s))   XR Chest Port 1 View    Narrative    CHEST PORTABLE ONE VIEW 7/15/2020 8:30 AM     HISTORY: Follow up empyema.    COMPARISON: 7/12/2020.      Impression    IMPRESSION: Drainage catheter at the left lower hemithorax is again  seen. Slight decrease in left mid and lower lung zone opacity,  presumably mostly pleural fluid. Some pleural fluid is again seen  extending over the left lung apex. Numerous left-sided rib fractures  posterolaterally are again noted. No pneumothorax. The right lung is  clear. Heart size is difficult to evaluate due to overlying opacity  but is probably unchanged. Vascular calcifications are noted.       Assessment: s/p left chest tube placement for empyema     Plan: Continue chest tube to suction, may need to add tPA through tube as outputs are tapering, CXR shows decreasing fluid.  Discussed with Dr. De León pulmonology, will f/u on Chest CT and if loculated fluid remains would plan for tPA, however pt currently on Eliquis 2.5mg bid for AFib. Not good surgical candidate at this time.    20 minutes were spent with patient during today's visit with greater than 50% of the time spent face to face with the patient, in reviewing medical record and images and in counseling and coordinating patient's care.    Gilberto Nixon,  PA-C  Interventional Radiology

## 2020-07-15 NOTE — PLAN OF CARE
Discharge Planner PT   Patient plan for discharge: Home.  Current status: Pt performed supine to/from sit with SBA and sit to/from stand transfer with SBA.  Gait training x 650 ft using wheeled walker and SBA. Performed 3 stairs x 1 trial using 1 rail and CGA.    Barriers to return to prior living situation: None based on functional mobility.   Recommendations for discharge: Home with OP PT per plan established by the PT.   Rationale for recommendations: Pt will benefit from continued skilled rehab services, in order to continue to progress activity tolerance, strength and higher level balance.        Entered by: Josefina Dominguez 07/15/2020 3:57 PM

## 2020-07-15 NOTE — PROGRESS NOTES
"PULMONOLOGY PROGRESS NOTE    Date of Admission: 7/10/2020    CC/Reason for Hospital visit:  empyema  SUBJECTIVE      Events reviewed since last seen. Stable night. No new respiratory problems. States breathing is about the same today. ID note reviewed. Chest tube drained 380 ml 2 days ago, 80 ml yesterday, and 10 ml so far today.    ROS: A Problem Pertinent review of systems was negative except for items noted in HPI.  Past Medical, Family, and Social/Substance History has been reviewed: No interval changes.     OBJECTIVE   Vital signs:  Temp: 97.8  F (36.6  C) Temp src: Oral BP: 122/78 Pulse: 63 Heart Rate: 93 Resp: 20 SpO2: 98 % O2 Device: None (Room air) Oxygen Delivery: 2 LPM Height: 175.3 cm (5' 9\") Weight: 71.1 kg (156 lb 12 oz)  Estimated body mass index is 23.15 kg/m  as calculated from the following:    Height as of this encounter: 1.753 m (5' 9\").    Weight as of this encounter: 71.1 kg (156 lb 12 oz).      I/O last 3 completed shifts:  In: 2240 [P.O.:2240]  Out: 1390 [Urine:1300; Chest Tube:90]    CONSTITUTIONAL/GENERAL APPEARANCE: Alert male. No Apparent Distress.  PSYCHIATRIC: Pleasant and appropriate mood and affect. Oriented x 3.  EARS, NOSE,THROAT,MOUTH: External ears and nose overall normal. Normal oral mucosa.   NECK: Neck appearance normal. No neck masses and the thyroid is not enlarged.   RESPIRATORY: Non-labored effort. Decreased BS left base, right lung clear.  CARDIOVASCULAR: S1, S2, regular rate and rhythm.    LABORATORY ASSESSMENT    Arterial Blood GasNo lab results found in last 7 days.  CBC  Recent Labs   Lab 07/15/20  0735 07/14/20  0755 07/12/20  0806 07/10/20  2113   WBC 15.6* 13.6* 17.5* 28.7*   RBC 3.90* 3.94* 3.99* 3.88*   HGB 10.6* 10.7* 10.9* 10.8*   HCT 34.1* 34.3* 35.2* 33.6*   MCV 87 87 88 87   MCH 27.2 27.2 27.3 27.8   MCHC 31.1* 31.2* 31.0* 32.1   RDW 17.0* 16.8* 17.1* 17.2*   * 617* 563* 496*     BMP  Recent Labs   Lab 07/15/20  0735 07/14/20  0755 07/12/20  0806 " 07/10/20  2113    135 133 135   POTASSIUM 4.2 3.8 4.4 4.2   CHLORIDE 103 100 99 100   LLOYD 8.2* 8.3* 8.8 9.1   CO2 29 28 22 31   BUN 21 29 36* 27   CR 1.78* 1.72* 1.64* 1.68*   * 280* 409* 125*     INRNo lab results found in last 7 days.   BNP  No lab results found in last 7 days.  VENOUS BLOOD GASESNo lab results found in last 7 days.      Additional labs and/or comments:  Pleural fluid - MSSA    Pleural Fluid cytology: Negative for malignancy, acute inflammation present.    IMAGING      CXR 7/15 -  IMPRESSION: Drainage catheter at the left lower hemithorax is again  seen. Slight decrease in left mid and lower lung zone opacity,  presumably mostly pleural fluid. Some pleural fluid is again seen  extending over the left lung apex. Numerous left-sided rib fractures  posterolaterally are again noted. No pneumothorax. The right lung is  clear. Heart size is difficult to evaluate due to overlying opacity  but is probably unchanged. Vascular calcifications are noted.    CXR 7/12 -  IMPRESSION: New pleural catheter noted on the left. Improved left  effusion. Question some ex vacuo pleural air at the left base. Midlung  infiltrate unchanged. Right lung clear.    CT Chest 7/11 -  IMPRESSION: Large loculated left pleural effusion with pleural  catheter in place. Question whether the catheter is occluded or  excluded from the fluid due to loculation.    PFT & OTHER TESTING       ASSESSMENT / PLAN      Pulmonary Diagnoses:  COPD J44.9  Empyema J86.9  Pleural effusion  Pulm HTN second I27.2  SOB R06.02       ASSESSMENT: 80-year-old male former smoker with multiple medical problems including COPD, CHF, pulmonary hypertension, atrial fibrillation, chronic kidney disease and recurrent transudative left pleural effusion is readmitted with an empyema. Patient had been followed in our clinic by Dr. Gonzalez and was last seen by him April 2020.  He was hospitalized at [facility] 5/10-5/14/2020 for acute hypoxemic  respiratory failure and recurrent left pleural effusion requiring 6 thoracenteses since December 2019.  He underwent Pleurx catheter placement on 5/12/2020.  He was readmitted to [facility] from 5/21-5/29/2020 for DKA.  CT scan of the chest 5/22/2020 showed the left pleural effusion had nearly completely resolved; there were residual areas of rounded atelectasis and scarring in the left lung base.  I saw the patient once via telemedicine on 6/5/2020 at which time he reported that he was draining his chest tube every other day and that the drainage volume had ranged between 650 mL and 800 mL.  He had been maintained on Spiriva Respimat but had run out of his inhaler.  He was instructed to continue with his present drainage program.  He contacted our office on 7/1/2020 reporting that that his diuretics had been increased and that since 6/8/2020 he had had no additional drainage.  Chest x-ray was ordered for 7/8/2020 at the time of his follow-up cardiology visit, and this showed a small left pneumothorax and small to moderate sized left pleural effusion.  The patient was admitted from 7/8-7/10/2020 for further diuresis; he was seen by IR and the chest tube was connected to suction with removal of 550 mL of fluid.  He was discharged to home but then readmitted later that evening for weakness, fever and leukocytosis.  CT scan of the chest 7/11/2020 showed a large loculated left pleural effusion despite his Pleurx catheter being in place.  Pleural fluid labs were consistent with an empyema, and cultures grew MSSA.  IR subsequently remove the Pleurx catheter and placed a new left-sided chest tube which has continued to drain. Repeat chest x-ray today shows a slight decrease in the left pleural effusion.  WBC had decreased from 28.7 on admission to 13.6 yesterday, but is 15.6 today.  Recommend a repeat CT scan of the chest to reassess the pleural space for residual loculations and the need for lytic therapy.  Will  need to be cautious with lytics given that patient is currently anticoagulated.  Respiratory status remains stable on room air.    PLAN:  1. Bronchodilators - Incruse.  2. Antibiotics - Ancef per ID.  3. Diuresis - Lasix as ordered.  4. Anticoagulation - Eliquis.  5. Continue chest tube to suction.  6. Recheck CT Chest - IR to review and administer lytics if appropriate.    Case discussed with FORTUNATO Dominguez for IR.    Dr. Gonzalez will be following the patient starting tomorrow.      Philip Aguilera M.D.    [facility]  [phone]   (pager)  [phone]   (office)

## 2020-07-15 NOTE — PROGRESS NOTES
[facility]    Received referral for home IV ancef.  Benefits verified. Patient has [employer] Plan - 80% up to oop $3000 (met $1655 so far). Once all of the oop has been satisfied, then pt will have 100% coverage    I met with the patient to introduce home infusion services, review benefits and offer choice of providers. He would like to proceed with [facility] for IV needs. I will do education at bedside prior to discharge.     Thank you for the referral.    Jessika David RN  [facility] Liaison  [phone] (Mon thru Fri 8am - 5pm)  [phone] Office

## 2020-07-15 NOTE — PLAN OF CARE
VSS on RA, A/O x4. Lung sounds diminished in bases, BS active, + flatus. CT to -20 suction with minimal serous output - dressing changed per IR KAMILLA. Old CT puncture site closed with LB. Patient denied pain. Voiding adequately. Up with SBA + GB. Regular diet. Discussed hypoglycemia at night with Dr. Moreno - insulin adjusted.

## 2020-07-16 ENCOUNTER — APPOINTMENT (OUTPATIENT)
Dept: GENERAL RADIOLOGY | Facility: CLINIC | Age: 80
DRG: 178 | End: 2020-07-16
Attending: PHYSICIAN ASSISTANT
Payer: COMMERCIAL

## 2020-07-16 LAB
ANION GAP SERPL CALCULATED.3IONS-SCNC: 7 MMOL/L (ref 3–14)
BACTERIA SPEC CULT: ABNORMAL
BACTERIA SPEC CULT: ABNORMAL
BUN SERPL-MCNC: 17 MG/DL (ref 7–30)
CALCIUM SERPL-MCNC: 8.4 MG/DL (ref 8.5–10.1)
CHLORIDE SERPL-SCNC: 100 MMOL/L (ref 94–109)
CO2 SERPL-SCNC: 28 MMOL/L (ref 20–32)
CREAT SERPL-MCNC: 1.38 MG/DL (ref 0.66–1.25)
ERYTHROCYTE [DISTWIDTH] IN BLOOD BY AUTOMATED COUNT: 16.9 % (ref 10–15)
GFR SERPL CREATININE-BSD FRML MDRD: 46 ML/MIN/{1.73_M2}
GLUCOSE BLDC GLUCOMTR-MCNC: 238 MG/DL (ref 70–99)
GLUCOSE BLDC GLUCOMTR-MCNC: 329 MG/DL (ref 70–99)
GLUCOSE BLDC GLUCOMTR-MCNC: 377 MG/DL (ref 70–99)
GLUCOSE BLDC GLUCOMTR-MCNC: 89 MG/DL (ref 70–99)
GLUCOSE SERPL-MCNC: 212 MG/DL (ref 70–99)
HCT VFR BLD AUTO: 34 % (ref 40–53)
HGB BLD-MCNC: 10.6 G/DL (ref 13.3–17.7)
MCH RBC QN AUTO: 27.1 PG (ref 26.5–33)
MCHC RBC AUTO-ENTMCNC: 31.2 G/DL (ref 31.5–36.5)
MCV RBC AUTO: 87 FL (ref 78–100)
PLATELET # BLD AUTO: 654 10E9/L (ref 150–450)
POTASSIUM SERPL-SCNC: 3.7 MMOL/L (ref 3.4–5.3)
RBC # BLD AUTO: 3.91 10E12/L (ref 4.4–5.9)
SODIUM SERPL-SCNC: 135 MMOL/L (ref 133–144)
SPECIMEN SOURCE: ABNORMAL
WBC # BLD AUTO: 16.8 10E9/L (ref 4–11)

## 2020-07-16 PROCEDURE — 85027 COMPLETE CBC AUTOMATED: CPT | Performed by: INTERNAL MEDICINE

## 2020-07-16 PROCEDURE — 00000146 ZZHCL STATISTIC GLUCOSE BY METER IP

## 2020-07-16 PROCEDURE — 25000132 ZZH RX MED GY IP 250 OP 250 PS 637: Performed by: INTERNAL MEDICINE

## 2020-07-16 PROCEDURE — 25000128 H RX IP 250 OP 636: Performed by: PHYSICIAN ASSISTANT

## 2020-07-16 PROCEDURE — 12000000 ZZH R&B MED SURG/OB

## 2020-07-16 PROCEDURE — 25000131 ZZH RX MED GY IP 250 OP 636 PS 637: Performed by: INTERNAL MEDICINE

## 2020-07-16 PROCEDURE — 36415 COLL VENOUS BLD VENIPUNCTURE: CPT | Performed by: INTERNAL MEDICINE

## 2020-07-16 PROCEDURE — 25000128 H RX IP 250 OP 636: Performed by: INTERNAL MEDICINE

## 2020-07-16 PROCEDURE — 99232 SBSQ HOSP IP/OBS MODERATE 35: CPT | Performed by: INTERNAL MEDICINE

## 2020-07-16 PROCEDURE — 40000239 ZZH STATISTIC VAT ROUNDS

## 2020-07-16 PROCEDURE — 80048 BASIC METABOLIC PNL TOTAL CA: CPT | Performed by: INTERNAL MEDICINE

## 2020-07-16 PROCEDURE — 71046 X-RAY EXAM CHEST 2 VIEWS: CPT

## 2020-07-16 RX ORDER — HYDROCODONE BITARTRATE AND ACETAMINOPHEN 5; 325 MG/1; MG/1
1 TABLET ORAL EVERY 6 HOURS PRN
Status: DISCONTINUED | OUTPATIENT
Start: 2020-07-16 | End: 2020-07-18 | Stop reason: HOSPADM

## 2020-07-16 RX ORDER — TRAMADOL HYDROCHLORIDE 50 MG/1
50 TABLET ORAL EVERY 6 HOURS PRN
Status: DISCONTINUED | OUTPATIENT
Start: 2020-07-16 | End: 2020-07-18 | Stop reason: HOSPADM

## 2020-07-16 RX ORDER — DEXTROSE MONOHYDRATE 25 G/50ML
25-50 INJECTION, SOLUTION INTRAVENOUS
Status: DISCONTINUED | OUTPATIENT
Start: 2020-07-16 | End: 2020-07-18 | Stop reason: HOSPADM

## 2020-07-16 RX ORDER — NICOTINE POLACRILEX 4 MG
15-30 LOZENGE BUCCAL
Status: DISCONTINUED | OUTPATIENT
Start: 2020-07-16 | End: 2020-07-18 | Stop reason: HOSPADM

## 2020-07-16 RX ADMIN — INSULIN GLARGINE 14 UNITS: 100 INJECTION, SOLUTION SUBCUTANEOUS at 07:50

## 2020-07-16 RX ADMIN — FUROSEMIDE 40 MG: 40 TABLET ORAL at 20:09

## 2020-07-16 RX ADMIN — UMECLIDINIUM 1 PUFF: 62.5 AEROSOL, POWDER ORAL at 07:51

## 2020-07-16 RX ADMIN — APIXABAN 2.5 MG: 2.5 TABLET, FILM COATED ORAL at 07:46

## 2020-07-16 RX ADMIN — VERAPAMIL HYDROCHLORIDE 240 MG: 240 TABLET, FILM COATED, EXTENDED RELEASE ORAL at 22:11

## 2020-07-16 RX ADMIN — HYDROCODONE BITARTRATE AND ACETAMINOPHEN 1 TABLET: 5; 325 TABLET ORAL at 22:11

## 2020-07-16 RX ADMIN — LOSARTAN POTASSIUM 25 MG: 25 TABLET, FILM COATED ORAL at 07:45

## 2020-07-16 RX ADMIN — FUROSEMIDE 40 MG: 40 TABLET ORAL at 07:45

## 2020-07-16 RX ADMIN — ALTEPLASE 2 MG: 2.2 INJECTION, POWDER, LYOPHILIZED, FOR SOLUTION INTRAVENOUS at 18:01

## 2020-07-16 RX ADMIN — TRAMADOL HYDROCHLORIDE 50 MG: 50 TABLET, FILM COATED ORAL at 14:40

## 2020-07-16 RX ADMIN — CEFAZOLIN SODIUM 2 G: 2 INJECTION, SOLUTION INTRAVENOUS at 22:11

## 2020-07-16 RX ADMIN — ALTEPLASE 2 MG: 2.2 INJECTION, POWDER, LYOPHILIZED, FOR SOLUTION INTRAVENOUS at 10:13

## 2020-07-16 RX ADMIN — POTASSIUM CHLORIDE 10 MEQ: 750 TABLET, EXTENDED RELEASE ORAL at 20:09

## 2020-07-16 RX ADMIN — POTASSIUM CHLORIDE 10 MEQ: 750 TABLET, EXTENDED RELEASE ORAL at 07:46

## 2020-07-16 RX ADMIN — PRAVASTATIN SODIUM 20 MG: 20 TABLET ORAL at 07:45

## 2020-07-16 RX ADMIN — CEFAZOLIN SODIUM 2 G: 2 INJECTION, SOLUTION INTRAVENOUS at 11:08

## 2020-07-16 RX ADMIN — HYDROCODONE BITARTRATE AND ACETAMINOPHEN 1 TABLET: 5; 325 TABLET ORAL at 16:03

## 2020-07-16 ASSESSMENT — ACTIVITIES OF DAILY LIVING (ADL)
ADLS_ACUITY_SCORE: 13

## 2020-07-16 NOTE — PLAN OF CARE
A&OX4, VSS.  Denied pain up until 1 hour after TPA injected into CT this morning.  1 hour after TPA insertion, immediate return of 800 ml serosang drainage from CT and c/o pain/chest pain. PAs and doctors aware.  Received order for prn pain meds and administered to patient with relief.  After CXR, talked with Miles Johnson, PAC and received order to give 2nd dose of TPA, hold Eliquis and NPO after midnight.  Lungs clear, denies SOB or chest pain now. Adequate I&Os. Continue to monitor with 2nd dose of TPA just having been  infused.

## 2020-07-16 NOTE — PROGRESS NOTES
"[facility]  Hospitalist Progress Note  Nicolás Moreno MD  07/16/2020    Assessment & Plan  Interval History      Summary of Stay: Tc Garcia is a 80 year old male who was admitted on 7/10/2020.  He has had a persistent left pleural effusion with multiple admissions.  He was recently discharged and returned with more pulmonary complaints.  There was a concern of possible infection of the fluid.  Pulmonology was consulted and his pleux catheter was removed and a chest tube placed with fluid studies sent.  The fluid findings suggest infection.  Patient tested covid negative this admission and recently prior to this admission.        Assessment & Plan      Recurrent left pleural effusion with studies suggestive of empyema:  -  Chest tube, will defer management and consideration of lytics to pulmonology, discussed with Dr Rodriguez who felt surgery as a \"last resort\" and continued treatment with pleurx cathter and lytics.  -  noted change to ancef, appreciate ID consultation  -  Not requiring oxygen currently, monitor  - Thoracic surgery and pulmonary consulted and discussed with Berry Hunt and Ale.  - C-T drainage is decreasing dramatically 800 > 500 > 100 ml output   - lytics placed 7/16 and had large 700 ml output with increased pain, order ultram, norco for pain  - plan for IV ancef for at least 2 weeks, mid-line in place.     DM:  -  Some low glucoses when NPO for procedures 7/11.  He then developed hyperglycemia when diet resumed as he had less than his usual insulin.   - Current plan with lantus + meal insulin +  high resistance sliding scale insulin and mealtime dose increased from 4 unit(s).   - daytime BG better controlled but erratic having evening and nighttime hypoglycemia.  - current plan, med intensity sliding scale insulin, mealtime novolog of 4 unit(s) + sliding scale insulin, and home dose lantus 14 units.     CKD:  - Monitor, avoid nephrotoxic meds as able.  - On chronic potassium " and lasix.   - creat 1.5 - 1.7 which is his baseline     Parox afib on chronic anticoag with apixaban:  - Apixaban previously on hold with recent procedures.   - Continue verapamil  - continue apixiban 2.5 mg bid       HTN:  -  Controlled, Continue cozaar     Hyperlipidemia:  -  Continue lovastatin     COPD without an acute exacerbation:  -  Incruse Ellipta        COVID Status:               COVID-19 PCR Results    COVID-19 PCR Results 4/25/20 4/25/20 5/10/20 5/10/20 5/21/20 5/21/20 7/8/20 7/8/20 7/10/20 7/10/20     1450 1450 2207 2207 1403 1403 1603 1603 2348 2348   COVID-19 Virus PCR to [facility] - Result Test received-See reflex to IDDL test SARS CoV2 (COVID-19) Virus RT-PCR   Test received-See reflex to IDDL test SARS CoV2 (COVID-19) Virus RT-PCR   Test received-See reflex to IDDL test SARS CoV2 (COVID-19) Virus RT-PCR   Test received-See reflex to IDDL test SARS CoV2 (COVID-19) Virus RT-PCR   Test received-See reflex to IDDL test SARS CoV2 (COVID-19) Virus RT-PCR     COVID-19 Virus PCR to [facility] - Source Nasopharyngeal   Nasopharyngeal (C)   Nasopharyngeal   Nasopharyngeal   Nasopharyngeal     SARS-CoV-2 Virus Specimen Source   Nasopharyngeal   Nasopharyngeal   Nasopharyngeal   Nasopharyngeal   Nasopharyngeal   SARS-CoV-2 PCR Result   NEGATIVE   NEGATIVE   NEGATIVE   NEGATIVE   NEGATIVE   (C) Corrected value        Comments are available for some flowsheets but are not being displayed.           COVID-19 Antibody Results, Testing for Immunity    COVID-19 Antibody Results, Testing for Immunity   No data to display.              Diet: Regular Diet Adult    DVT Prophylaxis: Pneumatic Compression Devices  Pruett Catheter: not present  Code Status: Full Code      Disposition  -- TBC by IR and pulmonary services  -- may need C-T replaced after lytics completed.  -- anticipate patient to go home with home OP PT        Interval History    -- had more chest pain with increased C-T output after lytics given  -- breathing  "better    -Data reviewed today: I reviewed all new labs and imaging over the last 24 hours. I personally reviewed no images or EKG's today.    Physical Exam   Heart Rate: 69, Blood pressure (!) 140/78, pulse 69, temperature 97.2  F (36.2  C), temperature source Oral, resp. rate 16, height 1.753 m (5' 9\"), weight 71.1 kg (156 lb 12 oz), SpO2 93 %.  Vitals:    07/12/20 0636 07/14/20 0600 07/15/20 0500   Weight: 68.5 kg (151 lb 0.2 oz) 71.4 kg (157 lb 6.5 oz) 71.1 kg (156 lb 12 oz)     Vital Signs with Ranges  Temp:  [97.2  F (36.2  C)-98.2  F (36.8  C)] 97.2  F (36.2  C)  Pulse:  [69-71] 69  Heart Rate:  [59-69] 69  Resp:  [16-20] 16  BP: (137-144)/(78-82) 140/78  SpO2:  [93 %-99 %] 93 %  I/O's Last 24 hours  I/O last 3 completed shifts:  In: 1500 [P.O.:1500]  Out: 1620 [Urine:1600; Chest Tube:20]    Constitutional: Awake, alert, cooperative, no apparent distress  Respiratory: Improved A/E left lung base  Cardiovascular:  irregular  GI: Normal bowel sounds, soft, non-distended, non-tender  Skin/Integumen: No rashes, no cyanosis, no edema  Other:      Medications   All medications were reviewed.    - MEDICATION INSTRUCTIONS -         alteplase (ACTIVASE) chest tube irrigation in syringe- 8 mg doses  2 mg Other Q8H     apixaban ANTICOAGULANT  2.5 mg Oral BID     ceFAZolin  2 g Intravenous Q12H     furosemide  40 mg Oral BID     [START ON 7/17/2020] insulin aspart  4 Units Subcutaneous QAM AC     insulin aspart  4 Units Subcutaneous Daily with lunch     insulin aspart  1-7 Units Subcutaneous TID AC     insulin aspart  1-5 Units Subcutaneous At Bedtime     insulin aspart  2 Units Subcutaneous Daily with supper     insulin glargine  14 Units Subcutaneous QAM     losartan  25 mg Oral Daily     potassium chloride ER  10 mEq Oral BID     pravastatin  20 mg Oral Daily     sodium chloride (PF)  10 mL Intracatheter Q8H     sodium chloride (PF)  3 mL Intracatheter Q8H     umeclidinium  1 puff Inhalation Daily     verapamil ER  " 240 mg Oral At Bedtime        Data   Recent Labs   Lab 07/16/20  0740 07/15/20  0735 07/14/20  0755  07/11/20  0600 07/10/20  2113   WBC 16.8* 15.6* 13.6*   < >  --  28.7*   HGB 10.6* 10.6* 10.7*   < >  --  10.8*   MCV 87 87 87   < >  --  87   * 586* 617*   < >  --  496*    138 135   < >  --  135   POTASSIUM 3.7 4.2 3.8   < >  --  4.2   CHLORIDE 100 103 100   < >  --  100   CO2 28 29 28   < >  --  31   BUN 17 21 29   < >  --  27   CR 1.38* 1.78* 1.72*   < >  --  1.68*   ANIONGAP 7 6 7   < >  --  4   LLOYD 8.4* 8.2* 8.3*   < >  --  9.1   * 162* 280*   < >  --  125*   ALBUMIN  --   --   --   --   --  2.5*   PROTTOTAL  --   --   --   --  5.4* 5.9*   BILITOTAL  --   --   --   --   --  0.9   ALKPHOS  --   --   --   --   --  98   ALT  --   --   --   --   --  13   AST  --   --   --   --   --  12    < > = values in this interval not displayed.       No results found for this or any previous visit (from the past 24 hour(s)).    Nicolás Moreno MD  Text Page  (7am to 6pm)

## 2020-07-16 NOTE — PLAN OF CARE
"PT: Attempted session x2 this afternoon. Initially pt off unit for x-ray then on second attempt, pt declining participation stating \"I can't do anything, my lung just expanded and that hurts.\"   "

## 2020-07-16 NOTE — PROGRESS NOTES
"SPIRITUAL HEALTH SERVICES  SPIRITUAL ASSESSMENT Progress Note  [facility]     REFERRAL SOURCE: Referred by intern     Reviewed documentation. Reflective conversation shared with Tc which integrated elements of illness and family narratives.     Tc has been \"in and out of the hospital\" recently and describes himself as \"having been very discouraged, but I'm feeling better now.\"  He talks to his 4 children daily and his wife, Radha, \"10 times a day.\" He is hopeful that he and Radha will be able to \"be together again soon.\"   He loves to cook. Recently he has not felt like eating, but says \"I'm looking forward to cooking and eating again.\"  He \"says his prayers every night\" and welcomed a prayer at the end of our visit. We said the Our Father together and I offered a prayer which incorporated conversational themes.    I offered spiritual and emotional support through reflective listening that affirmed emotions, experience, and meaning.     PLAN: [facility] remains available for support during his hospitalization.    Kaya Perez  Associate   Pager [phone]  Phone [phone]    "

## 2020-07-16 NOTE — PROGRESS NOTES
IR Note    CXR shows additional fluid in the left pleural space.  Recommend doing another TPA treatment with the patient lying on the left lateral decubitus position.  2 mg in 20 ml  Locked for 30-60 minutes.    Repeat chest xray tomorrow morning.  Hold Eliquis and make NPO after midnight incase we need to place another chest tube.    Miles Johnson

## 2020-07-16 NOTE — PROGRESS NOTES
"PULMONOLOGY PROGRESS NOTE    Date of Admission: 7/10/2020    CC/Reason for Hospital visit:  empyema  SUBJECTIVE      Overnight events reviewed, feels okay, about 40ml/24 hrs drainage in chest tube, no air leak    ROS: A Problem Pertinent review of systems was negative except for items noted in HPI.  Past Medical, Family, and Social/Substance History has been reviewed: No interval changes.     OBJECTIVE   Vital signs:  Temp: 97.7  F (36.5  C) Temp src: Oral BP: 137/79 Pulse: 69 Heart Rate: 59 Resp: 16 SpO2: 99 % O2 Device: None (Room air) Oxygen Delivery: 2 LPM Height: 175.3 cm (5' 9\") Weight: 71.1 kg (156 lb 12 oz)  Estimated body mass index is 23.15 kg/m  as calculated from the following:    Height as of this encounter: 1.753 m (5' 9\").    Weight as of this encounter: 71.1 kg (156 lb 12 oz).      I/O last 3 completed shifts:  In: 1500 [P.O.:1500]  Out: 1620 [Urine:1600; Chest Tube:20]    CONSTITUTIONAL/GENERAL APPEARANCE: Alert male. No Apparent Distress.  PSYCHIATRIC: Pleasant and appropriate mood and affect. Oriented x 3.  EARS, NOSE,THROAT,MOUTH: External ears and nose overall normal. Normal oral mucosa.   NECK: Neck appearance normal. No neck masses and the thyroid is not enlarged.   RESPIRATORY: Non-labored effort. Decreased BS left base, right lung clear.  CARDIOVASCULAR: S1, S2, regular rate and rhythm.    LABORATORY ASSESSMENT    Arterial Blood GasNo lab results found in last 7 days.  CBC  Recent Labs   Lab 07/16/20  0740 07/15/20  0735 07/14/20  0755 07/12/20  0806   WBC 16.8* 15.6* 13.6* 17.5*   RBC 3.91* 3.90* 3.94* 3.99*   HGB 10.6* 10.6* 10.7* 10.9*   HCT 34.0* 34.1* 34.3* 35.2*   MCV 87 87 87 88   MCH 27.1 27.2 27.2 27.3   MCHC 31.2* 31.1* 31.2* 31.0*   RDW 16.9* 17.0* 16.8* 17.1*   * 586* 617* 563*     BMP  Recent Labs   Lab 07/16/20  0740 07/15/20  0735 07/14/20  0755 07/12/20  0806    138 135 133   POTASSIUM 3.7 4.2 3.8 4.4   CHLORIDE 100 103 100 99   LLOYD 8.4* 8.2* 8.3* 8.8   CO2 28 29 " 28 22   BUN 17 21 29 36*   CR 1.38* 1.78* 1.72* 1.64*   * 162* 280* 409*     INRNo lab results found in last 7 days.   BNP  No lab results found in last 7 days.  VENOUS BLOOD GASESNo lab results found in last 7 days.      Additional labs and/or comments:  Pleural fluid - MSSA    Pleural Fluid cytology: Negative for malignancy, acute inflammation present.    IMAGING      CXR 7/15 -  IMPRESSION: Drainage catheter at the left lower hemithorax is again  seen. Slight decrease in left mid and lower lung zone opacity,  presumably mostly pleural fluid. Some pleural fluid is again seen  extending over the left lung apex. Numerous left-sided rib fractures  posterolaterally are again noted. No pneumothorax. The right lung is  clear. Heart size is difficult to evaluate due to overlying opacity  but is probably unchanged. Vascular calcifications are noted.    CXR 7/12 -  IMPRESSION: New pleural catheter noted on the left. Improved left  effusion. Question some ex vacuo pleural air at the left base. Midlung  infiltrate unchanged. Right lung clear.    CT Chest 7/11 -  IMPRESSION: Large loculated left pleural effusion with pleural  catheter in place. Question whether the catheter is occluded or  excluded from the fluid due to loculation.    PFT & OTHER TESTING       ASSESSMENT / PLAN      Pulmonary Diagnoses:  COPD J44.9  Empyema J86.9  Pleural effusion  Pulm HTN second I27.2  SOB R06.02       ASSESSMENT: 80-year-old male former smoker with multiple medical problems including COPD, CHF, pulmonary hypertension, atrial fibrillation, chronic kidney disease and recurrent transudative left pleural effusion is readmitted with an empyema. Patient had been followed in our clinic by Dr. Gonzalez and was last seen by him April 2020.  He was hospitalized at [facility] 5/10-5/14/2020 for acute hypoxemic respiratory failure and recurrent left pleural effusion requiring 6 thoracenteses since December 2019.  He underwent Pleurx  catheter placement on 5/12/2020.  He was readmitted to [facility] from 5/21-5/29/2020 for DKA.  CT scan of the chest 5/22/2020 showed the left pleural effusion had nearly completely resolved; there were residual areas of rounded atelectasis and scarring in the left lung base.  I saw the patient once via telemedicine on 6/5/2020 at which time he reported that he was draining his chest tube every other day and that the drainage volume had ranged between 650 mL and 800 mL.  He had been maintained on Spiriva Respimat but had run out of his inhaler.  He was instructed to continue with his present drainage program.  He contacted our office on 7/1/2020 reporting that that his diuretics had been increased and that since 6/8/2020 he had had no additional drainage.  Chest x-ray was ordered for 7/8/2020 at the time of his follow-up cardiology visit, and this showed a small left pneumothorax and small to moderate sized left pleural effusion.  The patient was admitted from 7/8-7/10/2020 for further diuresis; he was seen by IR and the chest tube was connected to suction with removal of 550 mL of fluid.  He was discharged to home but then readmitted later that evening for weakness, fever and leukocytosis.  CT scan of the chest 7/11/2020 showed a large loculated left pleural effusion despite his Pleurx catheter being in place.  Pleural fluid labs were consistent with an empyema, and cultures grew MSSA.  IR subsequently remove the Pleurx catheter and placed a new left-sided chest tube which has continued to drain. Repeat chest x-ray today shows a slight decrease in the left pleural effusion.  WBC had decreased from 28.7 on admission to 13.6 yesterday, but is 15.6 today.  Recommend a repeat CT scan of the chest to reassess the pleural space for residual loculations and the need for lytic therapy.  Will need to be cautious with lytics given that patient is currently anticoagulated.  Respiratory status remains stable on room  air.    PLAN:  1. Bronchodilators - Incruse.  2. Antibiotics - Ancef per ID.  3. Diuresis - Lasix as ordered.  4. Anticoagulation - Eliquis.  5. Continue chest tube to suction. Agree with lytics. Appreciate IR assistance    Dinora Gonzalez M.D.  [facility]  Office: [phone]  Pager: [phone]

## 2020-07-16 NOTE — PLAN OF CARE
AxO x4. VSS, RA. Denies pain. Denies nausea. CMS intact. Chest tube patent, no air leak noted.IV anbx given. BS checks, within normal. SBA. Standing at bedside to use urinal. Discharge pending progress. Plan for outpatient PT, day pending.

## 2020-07-16 NOTE — PROVIDER NOTIFICATION
Spoke with Miles Moya, PAC, in regards to CT dumping 700 ml serosang drainage 2 hours post TPA insertion.  Pt c/o pain initially and now feels comfortable.

## 2020-07-16 NOTE — PROGRESS NOTES
"Interventional Radiology Progress Note:  Inpatient at [facility]  Date: 2020   Patient name: Tc Garcia  MRN:[id number]  :  1940    History: History: 81 yo male with history of recurrent left pleural effusions and placement of tunneled PleurX line to left pleural space on .  This was functioning well until last week when the patient was seen in the ED for shortness of breath.  The line was connected to vacuum bottle at that time and did drain a serous fluid.  No line adjustments were made.  Re-admission for shortness of breath and fever, now with staph empyema. Tunneled chest tube removed, placemnt of left chest tube non tunneled. History of a.fib and on eliquis.     Interval History:  Breathing better, no pain at chest tube site. Very little drainage at the site and CT shows fluid inferior to the chest tube.      Physical Exam:   Vitals: /79 (BP Location: Left arm)   Pulse 69   Temp 97.7  F (36.5  C) (Oral)   Resp 16   Ht 1.753 m (5' 9\")   Wt 71.1 kg (156 lb 12 oz)   SpO2 99%   BMI 23.15 kg/m     General: Stable. In no acute distress.  Neuro: A&O x 3. Moves all extremities equally.  Skin: Without excoriations, ecchymosis, erythema, lesions or open sores.  MSK: No gross motor weakness. Sensation intact. Proprioception intact.    Drain: Left chest tube Dressing is C/D/I. Only 40 ml of fluid since yesterday at 2:00 pm.      Labs:  Recent Labs   Lab 20  0740 07/15/20  0735 20  0755   HGB 10.6* 10.6* 10.7*   WBC 16.8* 15.6* 13.6*     Recent Labs   Lab 20  0740 07/15/20  0735 20  0755   CR 1.38* 1.78* 1.72*      Recent Labs   Lab 20  0600 07/10/20  2113   PROTTOTAL 5.4* 5.9*   ALBUMIN  --  2.5*   BILITOTAL  --  0.9   ALKPHOS  --  98   AST  --  12   ALT  --  13       Cultures:  Recent Labs   Lab 20  1500 07/10/20  2302 07/10/20  222   CULT Moderate growth  Staphylococcus aureus  *  Critical Value/Significant Value, preliminary " result only, called to and read back by  Alexandra Washburn RN 7.12.20 1357. ALEX   No growth after 5 days No growth after 5 days     Recent Results (from the past 24 hour(s))   CT Chest w/o Contrast    Narrative    CT CHEST WITHOUT CONTRAST 7/15/2020 2:12 PM     HISTORY: Pleural effusion. Staph empyema, reassess loculations and  need for lytic therapy.    COMPARISON: 7/11/2020    TECHNIQUE: Volumetric helical acquisition of CT images of the chest  from the clavicles to the kidneys were acquired without IV contrast.  Radiation dose for this scan was reduced using automated exposure  control, adjustment of the mA and/or kV according to patient size, or  iterative reconstruction technique.    FINDINGS: Moderate loculated hydropneumothorax on the left with a  pleural catheter at the base. Minimal right pleural effusion. Some  associated compressive atelectasis and/or infiltrate bilaterally, left  worse than right. No pericardial effusion. There are extensive  coronary vascular calcifications and/or stents consistent with  coronary artery disease. There are mild atherosclerotic changes of the  visualized aorta and its branches. There is no evidence of aortic  aneurysm. No acute findings in the visualized upper abdomen.      Impression    IMPRESSION: Moderate loculated left hydropneumothorax is only  minimally improved compared to previous with pleural catheter in  place.    ALLYSON MARTINEZ MD       Assessment: s/p left chest tube placement for empyema. Inferior collection not draining     Plan: Continue chest tube to suction. Will try 2 doses of TPA today with 2 mg in 20 ml.  Will also hold eliquis in case we need to place a new chest tube tomorrow.     15 minutes were spent with patient during today's visit with greater than 50% of the time spent face to face with the patient, in reviewing medical record and images and in counseling and coordinating patient's care.    Miles Johnson PA-C  Interventional Radiology

## 2020-07-17 ENCOUNTER — APPOINTMENT (OUTPATIENT)
Dept: ULTRASOUND IMAGING | Facility: CLINIC | Age: 80
DRG: 178 | End: 2020-07-17
Attending: PHYSICIAN ASSISTANT
Payer: COMMERCIAL

## 2020-07-17 ENCOUNTER — APPOINTMENT (OUTPATIENT)
Dept: INTERVENTIONAL RADIOLOGY/VASCULAR | Facility: CLINIC | Age: 80
DRG: 178 | End: 2020-07-17
Attending: PHYSICIAN ASSISTANT
Payer: COMMERCIAL

## 2020-07-17 ENCOUNTER — APPOINTMENT (OUTPATIENT)
Dept: CT IMAGING | Facility: CLINIC | Age: 80
DRG: 178 | End: 2020-07-17
Attending: PHYSICIAN ASSISTANT
Payer: COMMERCIAL

## 2020-07-17 ENCOUNTER — APPOINTMENT (OUTPATIENT)
Dept: PHYSICAL THERAPY | Facility: CLINIC | Age: 80
DRG: 178 | End: 2020-07-17
Payer: COMMERCIAL

## 2020-07-17 ENCOUNTER — APPOINTMENT (OUTPATIENT)
Dept: GENERAL RADIOLOGY | Facility: CLINIC | Age: 80
DRG: 178 | End: 2020-07-17
Attending: PHYSICIAN ASSISTANT
Payer: COMMERCIAL

## 2020-07-17 LAB
ANION GAP SERPL CALCULATED.3IONS-SCNC: 6 MMOL/L (ref 3–14)
BACTERIA SPEC CULT: NO GROWTH
BACTERIA SPEC CULT: NO GROWTH
BUN SERPL-MCNC: 18 MG/DL (ref 7–30)
CALCIUM SERPL-MCNC: 7.8 MG/DL (ref 8.5–10.1)
CHLORIDE SERPL-SCNC: 100 MMOL/L (ref 94–109)
CO2 SERPL-SCNC: 28 MMOL/L (ref 20–32)
CREAT SERPL-MCNC: 1.41 MG/DL (ref 0.66–1.25)
ERYTHROCYTE [DISTWIDTH] IN BLOOD BY AUTOMATED COUNT: 16.5 % (ref 10–15)
GFR SERPL CREATININE-BSD FRML MDRD: 46 ML/MIN/{1.73_M2}
GLUCOSE BLDC GLUCOMTR-MCNC: 147 MG/DL (ref 70–99)
GLUCOSE BLDC GLUCOMTR-MCNC: 232 MG/DL (ref 70–99)
GLUCOSE BLDC GLUCOMTR-MCNC: 235 MG/DL (ref 70–99)
GLUCOSE BLDC GLUCOMTR-MCNC: 248 MG/DL (ref 70–99)
GLUCOSE SERPL-MCNC: 197 MG/DL (ref 70–99)
HCT VFR BLD AUTO: 31.6 % (ref 40–53)
HGB BLD-MCNC: 10.1 G/DL (ref 13.3–17.7)
INR PPP: 1.18 (ref 0.86–1.14)
MCH RBC QN AUTO: 27.4 PG (ref 26.5–33)
MCHC RBC AUTO-ENTMCNC: 32 G/DL (ref 31.5–36.5)
MCV RBC AUTO: 86 FL (ref 78–100)
PLATELET # BLD AUTO: 589 10E9/L (ref 150–450)
POTASSIUM SERPL-SCNC: 3.7 MMOL/L (ref 3.4–5.3)
RBC # BLD AUTO: 3.68 10E12/L (ref 4.4–5.9)
SODIUM SERPL-SCNC: 134 MMOL/L (ref 133–144)
SPECIMEN SOURCE: NORMAL
SPECIMEN SOURCE: NORMAL
WBC # BLD AUTO: 17.1 10E9/L (ref 4–11)

## 2020-07-17 PROCEDURE — 12000000 ZZH R&B MED SURG/OB

## 2020-07-17 PROCEDURE — 0W9B3ZZ DRAINAGE OF LEFT PLEURAL CAVITY, PERCUTANEOUS APPROACH: ICD-10-PCS | Performed by: RADIOLOGY

## 2020-07-17 PROCEDURE — 25000128 H RX IP 250 OP 636: Performed by: INTERNAL MEDICINE

## 2020-07-17 PROCEDURE — 25000131 ZZH RX MED GY IP 250 OP 636 PS 637: Performed by: INTERNAL MEDICINE

## 2020-07-17 PROCEDURE — 00000146 ZZHCL STATISTIC GLUCOSE BY METER IP

## 2020-07-17 PROCEDURE — 71250 CT THORAX DX C-: CPT

## 2020-07-17 PROCEDURE — 40000239 ZZH STATISTIC VAT ROUNDS

## 2020-07-17 PROCEDURE — 25000125 ZZHC RX 250: Performed by: RADIOLOGY

## 2020-07-17 PROCEDURE — 32555 ASPIRATE PLEURA W/ IMAGING: CPT

## 2020-07-17 PROCEDURE — 71046 X-RAY EXAM CHEST 2 VIEWS: CPT

## 2020-07-17 PROCEDURE — G0463 HOSPITAL OUTPT CLINIC VISIT: HCPCS

## 2020-07-17 PROCEDURE — 85610 PROTHROMBIN TIME: CPT | Performed by: PHYSICIAN ASSISTANT

## 2020-07-17 PROCEDURE — 80048 BASIC METABOLIC PNL TOTAL CA: CPT | Performed by: INTERNAL MEDICINE

## 2020-07-17 PROCEDURE — 25000132 ZZH RX MED GY IP 250 OP 250 PS 637: Performed by: INTERNAL MEDICINE

## 2020-07-17 PROCEDURE — 40000863 ZZH STATISTIC RADIOLOGY XRAY, US, CT, MAR, NM

## 2020-07-17 PROCEDURE — 97116 GAIT TRAINING THERAPY: CPT | Mod: GP

## 2020-07-17 PROCEDURE — 99232 SBSQ HOSP IP/OBS MODERATE 35: CPT | Performed by: HOSPITALIST

## 2020-07-17 PROCEDURE — 25000132 ZZH RX MED GY IP 250 OP 250 PS 637: Performed by: PHYSICIAN ASSISTANT

## 2020-07-17 PROCEDURE — 85027 COMPLETE CBC AUTOMATED: CPT | Performed by: INTERNAL MEDICINE

## 2020-07-17 PROCEDURE — 25000128 H RX IP 250 OP 636

## 2020-07-17 RX ORDER — LIDOCAINE HYDROCHLORIDE 10 MG/ML
10 INJECTION, SOLUTION EPIDURAL; INFILTRATION; INTRACAUDAL; PERINEURAL ONCE
Status: COMPLETED | OUTPATIENT
Start: 2020-07-17 | End: 2020-07-17

## 2020-07-17 RX ORDER — CEFAZOLIN SODIUM 2 G/100ML
2 INJECTION, SOLUTION INTRAVENOUS EVERY 8 HOURS
Status: DISCONTINUED | OUTPATIENT
Start: 2020-07-17 | End: 2020-07-18 | Stop reason: HOSPADM

## 2020-07-17 RX ADMIN — PRAVASTATIN SODIUM 20 MG: 20 TABLET ORAL at 08:40

## 2020-07-17 RX ADMIN — APIXABAN 2.5 MG: 2.5 TABLET, FILM COATED ORAL at 20:11

## 2020-07-17 RX ADMIN — POTASSIUM CHLORIDE 10 MEQ: 750 TABLET, EXTENDED RELEASE ORAL at 20:11

## 2020-07-17 RX ADMIN — POTASSIUM CHLORIDE 10 MEQ: 750 TABLET, EXTENDED RELEASE ORAL at 08:40

## 2020-07-17 RX ADMIN — LIDOCAINE HYDROCHLORIDE 10 ML: 10 INJECTION, SOLUTION EPIDURAL; INFILTRATION; INTRACAUDAL; PERINEURAL at 11:07

## 2020-07-17 RX ADMIN — FUROSEMIDE 40 MG: 40 TABLET ORAL at 08:40

## 2020-07-17 RX ADMIN — HYDROCODONE BITARTRATE AND ACETAMINOPHEN 1 TABLET: 5; 325 TABLET ORAL at 10:34

## 2020-07-17 RX ADMIN — HYDROCODONE BITARTRATE AND ACETAMINOPHEN 1 TABLET: 5; 325 TABLET ORAL at 22:47

## 2020-07-17 RX ADMIN — INSULIN GLARGINE 14 UNITS: 100 INJECTION, SOLUTION SUBCUTANEOUS at 08:40

## 2020-07-17 RX ADMIN — CEFAZOLIN SODIUM 2 G: 2 INJECTION, SOLUTION INTRAVENOUS at 16:59

## 2020-07-17 RX ADMIN — UMECLIDINIUM 1 PUFF: 62.5 AEROSOL, POWDER ORAL at 08:41

## 2020-07-17 RX ADMIN — CEFAZOLIN SODIUM 2 G: 2 INJECTION, SOLUTION INTRAVENOUS at 11:42

## 2020-07-17 RX ADMIN — HYDROCODONE BITARTRATE AND ACETAMINOPHEN 1 TABLET: 5; 325 TABLET ORAL at 03:32

## 2020-07-17 RX ADMIN — FUROSEMIDE 40 MG: 40 TABLET ORAL at 20:11

## 2020-07-17 RX ADMIN — LOSARTAN POTASSIUM 25 MG: 25 TABLET, FILM COATED ORAL at 08:40

## 2020-07-17 RX ADMIN — HYDROCODONE BITARTRATE AND ACETAMINOPHEN 1 TABLET: 5; 325 TABLET ORAL at 16:59

## 2020-07-17 RX ADMIN — VERAPAMIL HYDROCHLORIDE 240 MG: 240 TABLET, FILM COATED, EXTENDED RELEASE ORAL at 22:09

## 2020-07-17 RX ADMIN — ONDANSETRON 4 MG: 4 TABLET, ORALLY DISINTEGRATING ORAL at 08:48

## 2020-07-17 ASSESSMENT — ACTIVITIES OF DAILY LIVING (ADL)
ADLS_ACUITY_SCORE: 13

## 2020-07-17 NOTE — PLAN OF CARE
Discharge Planner PT   Patient plan for discharge: Home.  Current status:   Pt supervision for sit-stands & pivots no assistive device, min assist initially for gait with LOB then improved to standby assist once up a little, 1 flight of stairs with bilateral rail use to pull himself up and to steady himself coming down - standby assist.   Barriers to return to prior living situation: None  Recommendations for discharge: Home with consistent rolling walker use, assist on stairs, with OP PT per plan established by the PT.   Rationale for recommendations: Pt will benefit from continued skilled rehab services, in order to continue to progress activity tolerance, strength and higher level balance to improve safe mobility.        Entered by: Brittany Dressler 07/17/2020 2:05 PM

## 2020-07-17 NOTE — PROGRESS NOTES
"CLINICAL NUTRITION SERVICES  -  ASSESSMENT NOTE      Malnutrition (7/17)  % Weight Loss:  Weight loss does not meet criteria for malnutrition   % Intake:  No decreased intake noted  Subcutaneous Fat Loss:  Unable to assess  Muscle Loss:  Unable to assess  Fluid Retention:  None noted    Malnutrition Diagnosis: Unable to determine due to lack of NFPA.         REASON FOR ASSESSMENT  Tc Garcia is a 80 year old male seen by Registered Dietitian for LOS      NUTRITION HISTORY  - Unable to obtain nutrition history - did not visit due to COVID-19 contact precautions and pt did not answer his phone.  We are familiar with pt from previous admissions. In April he had reported a 2 week hx of decreased intake, he had been taking nutritional supplements at home.  \"In regards to diabetes, patient has his own pump and does carb counting at home\".      CURRENT NUTRITION ORDERS  Diet Order:     Mod Consistent Carbohydrate --> NPO for procedure.    Current Intake/Tolerance:  Per flow sheets, has been eating %.      NUTRITION FOCUSED PHYSICAL ASSESSMENT FOR DIAGNOSING MALNUTRITION)  No:  Patient not available                Obtained from Chart/Interdisciplinary Team:  None    ANTHROPOMETRICS  Height: 5' 9\"  Weight: 156 lbs 11.95 oz (71.1 kg)  Body mass index is 23.15 kg/m .  Weight Status:  Normal BMI  IBW: 72.7 kg +/- 10%  % IBW: 98%  Weight History:   Wt Readings from Last 15 Encounters:   07/15/20 71.1 kg (156 lb 12 oz)   07/10/20 67.5 kg (148 lb 14.4 oz)   07/08/20 72.2 kg (159 lb 3.2 oz)   06/16/20 70.8 kg (156 lb)   06/05/20 67.5 kg (148 lb 12.8 oz)   05/29/20 69.4 kg (153 lb)   05/21/20 70.3 kg (155 lb)   05/18/20 74.4 kg (164 lb)   05/14/20 68.4 kg (150 lb 12.8 oz)   04/30/20 72.6 kg (160 lb 1.6 oz)   04/16/20 72.6 kg (160 lb)   03/23/20 74.8 kg (165 lb)   02/26/20 79.8 kg (176 lb)   02/25/20 80 kg (176 lb 4.8 oz)   01/24/20 79.4 kg (175 lb)   Note total wt loss of 18# (10%) in the past 6 months. Wt loss has slowed " down, he is down only 3# (2%) in the past 3 months.    LABS  Labs reviewed    MEDICATIONS  Medications reviewed      ASSESSED NUTRITION NEEDS PER APPROVED PRACTICE GUIDELINES:    Dosing Weight 71.1 kg - current wt  Estimated Energy Needs: 5869-6699 kcals (25-30 Kcal/Kg)  Justification: maintenance  Estimated Protein Needs: 70-85 grams protein (1-1.2 g pro/Kg)  Justification: Repletion and CKD    MALNUTRITION:  % Weight Loss:  Weight loss does not meet criteria for malnutrition   % Intake:  No decreased intake noted  Subcutaneous Fat Loss:  Unable to assess  Muscle Loss:  Unable to assess  Fluid Retention:  None noted    Malnutrition Diagnosis: Unable to determine due to lack of NFPA.      NUTRITION DIAGNOSIS:  Unintended weight loss related to decreased oral intake as evidenced by 10% wt loss x 6 months      NUTRITION INTERVENTIONS  Recommendations / Nutrition Prescription  Resume Mod carb diet after procedure.      Implementation  Nutrition education: No education needs assessed at this time  Medical Food Supplement - PRN.      Nutrition Goals  Pt will continue to consume at least 75% of meals.      MONITORING AND EVALUATION:  Progress towards goals will be monitored and evaluated per protocol and Practice Guidelines

## 2020-07-17 NOTE — PROGRESS NOTES
[facility]  Hospitalist Progress Note        Margarito Li MD   07/17/2020        Interval History:      - s/p tpa treatment of effusion by IR with good drainage from chest tube; reports feeling better         Assessment and Plan:        Tc Garcia is a 80 year old male with PMH of recurrent left pleural effusion, afib (on chronic anticoagulation), DM, CKD, COPD who was admitted on 7/10/2020 with concerns for empyema.    He has had a persistent left pleural effusion with multiple admissions.  He was recently discharged and returned with more pulmonary complaints.  There was a concern of possible infection of the fluid.  Pulmonology was consulted and his pleux catheter was removed and a chest tube placed with fluid studies sent.  The fluid findings suggest infection.  Patient tested covid negative this admission and recently prior to this admission.        Assessment & Plan      Recurrent persistent left pleural effusion with studies suggestive of empyema  S/p chest tube placement by IR on 7/11/20  S/p lytics (tPa) 7/16:  -  s/p chest tube placement on 7/11 and then with decreasing output, tPa was administered on 7/16 with good drainage  - CT surgery and IR following  - IR plans to re-eval residual fluid with US and then likely thoracentesis 7/17, also planning for repeat CT chest and likely removal of chest tube on 7/17  - remains afebrile, leukocytosis better but still up; wbc 28---17---13--17  - repeat CXR 7/17: Unchanged appearance of loculated appearing  hydropneumothorax on the left  - ID following, fluid culture positive with staph aureus; plan for IV ancef for at least 2 weeks, mid-line in place  - prn pain meds     DM:  - labile BS ranging from 80s--200s  - continue Lantus 14 units daily along with mealtime novolog of 4 unit(s) + sliding scale insulin   - monitor BS, hypoglycemia protocol in place     CKD stage 3:  - Monitor, avoid nephrotoxic meds as able.  - On chronic potassium  "and lasix.   - creat 1.5 - 1.7 which is his baseline     Parox afib on chronic anticoag with apixaban:  - Apixaban previously on hold with recent procedures.   - Continue verapamil  - continue apixiban 2.5 mg bid   - holding Eliquis per IR for likely need of repeat chest tube/thoracentesis, resume when ok with IR      HTN:  -  Controlled, Continue cozaar     Hyperlipidemia:  -  Continue lovastatin     COPD without an acute exacerbation:  -  Incruse Ellipta     COVID Status negative 7/10    DVT Prophylaxis: Pneumatic Compression Devices; Eliquis as above  Code Status: Full Code       Disposition  -- when cleared by IR and thoracic surgery  -- anticipate patient to go home with home OP PT and Iv antibiotics  -  for disposition                   Physical Exam:      Blood pressure 126/76, pulse 67, temperature 97.6  F (36.4  C), temperature source Oral, resp. rate 16, height 1.753 m (5' 9\"), weight 71.1 kg (156 lb 12 oz), SpO2 98 %.  Vitals:    07/12/20 0636 07/14/20 0600 07/15/20 0500   Weight: 68.5 kg (151 lb 0.2 oz) 71.4 kg (157 lb 6.5 oz) 71.1 kg (156 lb 12 oz)     Vital Signs with Ranges  Temp:  [97.2  F (36.2  C)-98.4  F (36.9  C)] 97.6  F (36.4  C)  Pulse:  [67-87] 67  Heart Rate:  [60-83] 66  Resp:  [16-18] 16  BP: (114-140)/(54-83) 126/76  SpO2:  [93 %-100 %] 98 %  I/O's Last 24 hours  I/O last 3 completed shifts:  In: 900 [P.O.:900]  Out: 2525 [Urine:760; Chest Tube:1765]    Constitutional: Alert, awake and oriented X 3; lying comfortably in bed in no apparent distress   HEENT: Pupils equal and reactive to light and accomodation, EOMI intact; neck supple no raised JVD or rigidity    Oral cavity: Moist mucosa   Cardiovascular: Normal s1 s2, regular rate and rhythm, no murmur   Lungs: Coarse breath sounds left lung; left chest tube in place   Abdomen: Soft, nt, nd, no guarding, rigidity or rebound; BS +   LE : No edema   Musculoskeletal: Power 5/5 in all extremities   Neuro: No focal neurological " deficits noted, CN II to XII grossly intact   Psychiatry: normal mood and affect                Medications:          apixaban ANTICOAGULANT  2.5 mg Oral BID     ceFAZolin  2 g Intravenous Q8H     furosemide  40 mg Oral BID     insulin aspart  4 Units Subcutaneous QAM AC     insulin aspart  4 Units Subcutaneous Daily with lunch     insulin aspart  1-7 Units Subcutaneous TID AC     insulin aspart  1-5 Units Subcutaneous At Bedtime     insulin aspart  2 Units Subcutaneous Daily with supper     insulin glargine  14 Units Subcutaneous QAM     losartan  25 mg Oral Daily     potassium chloride ER  10 mEq Oral BID     pravastatin  20 mg Oral Daily     sodium chloride (PF)  10 mL Intracatheter Q8H     umeclidinium  1 puff Inhalation Daily     verapamil ER  240 mg Oral At Bedtime     PRN Meds: acetaminophen, acetaminophen, albuterol, glucose **OR** dextrose **OR** glucagon, HYDROcodone-acetaminophen, lidocaine 4%, lidocaine (buffered or not buffered), melatonin, naloxone, ondansetron **OR** ondansetron, - MEDICATION INSTRUCTIONS -, sodium chloride (PF), traMADol         Data:      All new lab and imaging data was reviewed.   Recent Labs   Lab Test 07/17/20  0535 07/16/20  0740 07/15/20  0735  05/10/20  2207  04/16/20  0851 02/26/20  0949   WBC 17.1* 16.8* 15.6*   < > 18.1*   < >  --   --    HGB 10.1* 10.6* 10.6*   < > 9.5*   < >  --   --    MCV 86 87 87   < > 84   < >  --   --    * 654* 586*   < > 449   < > 258 284   INR  --   --   --   --  1.49*  --  1.88* 1.28*    < > = values in this interval not displayed.      Recent Labs   Lab Test 07/17/20  0535 07/16/20  0740 07/15/20  0735    135 138   POTASSIUM 3.7 3.7 4.2   CHLORIDE 100 100 103   CO2 28 28 29   BUN 18 17 21   CR 1.41* 1.38* 1.78*   ANIONGAP 6 7 6   LLOYD 7.8* 8.4* 8.2*   * 212* 162*     Recent Labs   Lab Test 07/08/20  1223 05/21/20  1302 05/10/20  2207   TROPI <0.015 0.034 0.020

## 2020-07-17 NOTE — PLAN OF CARE
Pt alert and oriented, tolerates diet, oral pain medications with good results, up with stand by assist and walker, gwendolyn done this am, chest tubes removed today, dressing clean dry and intact, lung sounds clear but diminished, good urine output, passing flatus,

## 2020-07-17 NOTE — PROGRESS NOTES
1100- pt here in US for thoracentesis.  VSS.  Reports pain at 6/10 on left back, oral analgesic given prior to coming to US dept.      Small amount of fluid removed from upper left pleural space.  50 ml removed, straw color.  Band aid to site.  Will call for transport.  Returning to room in stable condition.

## 2020-07-17 NOTE — PROGRESS NOTES
Ir Note    Reviewed AM CXR with Dr. Kramer.  Recommended using ultrasound to eval residual fluid and then do a thora to drain remaining fluid.  Possible Chest CT to review after drainage today.     Lytics appears to have helped.      Miles Johnson PA-C

## 2020-07-17 NOTE — PROVIDER NOTIFICATION
Pagehelene Macias. PT went for thoracentesis this am and 50mL was drained. Note yesterday said to hold the Eliquis and keep pt NPO.      Gilberto said it was okay to restart the Eliquis and for Hospitalist to put in the orders.      Milesjamil Moya came to the floor, will hold off on Eliquis, orders placed for a CT scan and chest tube will possibly be removed after scan.  Will continue to monitor.

## 2020-07-17 NOTE — PROGRESS NOTES
"PULMONOLOGY PROGRESS NOTE    Date of Admission: 7/10/2020    CC/Reason for Hospital visit:  empyema  SUBJECTIVE      Overnight events reviewed, feels better, chest tube removed  ROS: A Problem Pertinent review of systems was negative except for items noted in HPI.  Past Medical, Family, and Social/Substance History has been reviewed: No interval changes.     OBJECTIVE   Vital signs:  Temp: 97.5  F (36.4  C) Temp src: Oral BP: 135/83 Pulse: 78 Heart Rate: 80 Resp: 16 SpO2: 94 % O2 Device: None (Room air) Oxygen Delivery: 2 LPM Height: 175.3 cm (5' 9\") Weight: 71.1 kg (156 lb 12 oz)  Estimated body mass index is 23.15 kg/m  as calculated from the following:    Height as of this encounter: 1.753 m (5' 9\").    Weight as of this encounter: 71.1 kg (156 lb 12 oz).      I/O last 3 completed shifts:  In: 420 [P.O.:420]  Out: 2735 [Urine:1760; Chest Tube:975]    CONSTITUTIONAL/GENERAL APPEARANCE: Alert male. No Apparent Distress.  PSYCHIATRIC: Pleasant and appropriate mood and affect. Oriented x 3.  EARS, NOSE,THROAT,MOUTH: External ears and nose overall normal. Normal oral mucosa.   NECK: Neck appearance normal. No neck masses and the thyroid is not enlarged.   RESPIRATORY: Non-labored effort. Decreased BS left base, right lung clear.  CARDIOVASCULAR: S1, S2, regular rate and rhythm.    LABORATORY ASSESSMENT    Arterial Blood GasNo lab results found in last 7 days.  CBC  Recent Labs   Lab 07/17/20  0535 07/16/20  0740 07/15/20  0735 07/14/20  0755   WBC 17.1* 16.8* 15.6* 13.6*   RBC 3.68* 3.91* 3.90* 3.94*   HGB 10.1* 10.6* 10.6* 10.7*   HCT 31.6* 34.0* 34.1* 34.3*   MCV 86 87 87 87   MCH 27.4 27.1 27.2 27.2   MCHC 32.0 31.2* 31.1* 31.2*   RDW 16.5* 16.9* 17.0* 16.8*   * 654* 586* 617*     BMP  Recent Labs   Lab 07/17/20  0535 07/16/20  0740 07/15/20  0735 07/14/20  0755    135 138 135   POTASSIUM 3.7 3.7 4.2 3.8   CHLORIDE 100 100 103 100   LLOYD 7.8* 8.4* 8.2* 8.3*   CO2 28 28 29 28   BUN 18 17 21 29   CR 1.41* " 1.38* 1.78* 1.72*   * 212* 162* 280*     INR  Recent Labs   Lab 07/17/20  0950   INR 1.18*      BNP  No lab results found in last 7 days.  VENOUS BLOOD GASESNo lab results found in last 7 days.      Additional labs and/or comments:  Pleural fluid - MSSA    Pleural Fluid cytology: Negative for malignancy, acute inflammation present.    IMAGING      CXR 7/15 -  IMPRESSION: Drainage catheter at the left lower hemithorax is again  seen. Slight decrease in left mid and lower lung zone opacity,  presumably mostly pleural fluid. Some pleural fluid is again seen  extending over the left lung apex. Numerous left-sided rib fractures  posterolaterally are again noted. No pneumothorax. The right lung is  clear. Heart size is difficult to evaluate due to overlying opacity  but is probably unchanged. Vascular calcifications are noted.    CXR 7/12 -  IMPRESSION: New pleural catheter noted on the left. Improved left  effusion. Question some ex vacuo pleural air at the left base. Midlung  infiltrate unchanged. Right lung clear.    CT Chest 7/11 -  IMPRESSION: Large loculated left pleural effusion with pleural  catheter in place. Question whether the catheter is occluded or  excluded from the fluid due to loculation.    PFT & OTHER TESTING       ASSESSMENT / PLAN      Pulmonary Diagnoses:  COPD J44.9  Empyema J86.9  Pleural effusion  Pulm HTN second I27.2  SOB R06.02       ASSESSMENT: 80-year-old male former smoker with multiple medical problems including COPD, CHF, pulmonary hypertension, atrial fibrillation, chronic kidney disease and recurrent transudative left pleural effusion is readmitted with an empyema. Patient had been followed in our clinic by Dr. Gonzalez and was last seen by him April 2020.  He was hospitalized at [facility] 5/10-5/14/2020 for acute hypoxemic respiratory failure and recurrent left pleural effusion requiring 6 thoracenteses since December 2019.  He underwent Pleurx catheter placement on  5/12/2020.  He was readmitted to [facility] from 5/21-5/29/2020 for DKA.  CT scan of the chest 5/22/2020 showed the left pleural effusion had nearly completely resolved; there were residual areas of rounded atelectasis and scarring in the left lung base.  I saw the patient once via telemedicine on 6/5/2020 at which time he reported that he was draining his chest tube every other day and that the drainage volume had ranged between 650 mL and 800 mL.  He had been maintained on Spiriva Respimat but had run out of his inhaler.  He was instructed to continue with his present drainage program.  He contacted our office on 7/1/2020 reporting that that his diuretics had been increased and that since 6/8/2020 he had had no additional drainage.  Chest x-ray was ordered for 7/8/2020 at the time of his follow-up cardiology visit, and this showed a small left pneumothorax and small to moderate sized left pleural effusion.  The patient was admitted from 7/8-7/10/2020 for further diuresis; he was seen by IR and the chest tube was connected to suction with removal of 550 mL of fluid.  He was discharged to home but then readmitted later that evening for weakness, fever and leukocytosis.  CT scan of the chest 7/11/2020 showed a large loculated left pleural effusion despite his Pleurx catheter being in place.  Pleural fluid labs were consistent with an empyema, and cultures grew MSSA.  IR subsequently remove the Pleurx catheter and placed a new left-sided chest tube which has continued to drain. Repeat chest x-ray today shows a slight decrease in the left pleural effusion.  WBC had decreased from 28.7 on admission to 13.6 yesterday, but is 15.6 today.  Recommend a repeat CT scan of the chest to reassess the pleural space for residual loculations and the need for lytic therapy.  Will need to be cautious with lytics given that patient is currently anticoagulated.  Respiratory status remains stable on room  air.    PLAN:  1. Bronchodilators - Incruse.  2. Antibiotics - Ancef per ID.  3. Diuresis - Lasix as ordered.  4. Anticoagulation - Eliquis.  5. Chest tube removed, CT looks much improved with small loculation that is unable to be drained at this time. Recommend repeat imaging in 2 weeks to monitor to see if loculated effusion is not getting worse since he is not a surgical candidate. Do not recommend any tunneled catheter moving forward either.     Dinora Gonzalez M.D.  [facility]  Office: [phone]  Pager: [phone]

## 2020-07-17 NOTE — PLAN OF CARE
Pain well controlled with 1 [place] q6h. CT draining large amounts of serosang effluent since two TPA doses given yesterday; 275 on evening and 300 ml overnight.A and O x4

## 2020-07-17 NOTE — PROGRESS NOTES
RADIOLOGY PROCEDURE NOTE  Patient name: Tc Garcia  MRN: [id number]  : 1940    Pre-procedure diagnosis: Left sided chest tube with empyema  Post-procedure diagnosis: Same    Procedure Date/Time: 2020  2:30 PM  Procedure: Removal of chest tube.  CT scan showed only small residual fluid which the chest tube would not drain since the fluid is loculated.    Estimated blood loss: None  Specimen(s) collected with description: none  The patient tolerated the procedure well with no immediate complications.  Significant findings:Successfull removal of the chest tube.     See imaging dictation for procedural details.    Provider name: Miles Johnson PA-C  Assistant(s):None

## 2020-07-17 NOTE — PROGRESS NOTES
[facility]  Infectious Disease Progress Note          Assessment and Plan:   IMPRESSION:   1.  An 80-year-old male, known to me with persistent left pleural effusion with a PleurX tube in place, now admitted with some worsening symptoms, clean aspirate from the pleural fluid looked infected and now culture growing Staph aureus.  No major clinical sepsis.  Blood cultures so far negative.   2.  Chronic left pleural effusion, previously not infected, but now appears to have secondary infection, question migration through PleurX tube.   3.  Diabetes mellitus.   4.  Chronic kidney insufficiency.   5.  Paroxysmal atrial fibrillation.   6.  Chronic obstructive pulmonary disease.      RECOMMENDATIONS:   1.  Continue Ancef, pansensitive staph pure cx.   2.  Appears to have true empyema, now with chest tube drainage occurring.  The exact plan with tube duration unclear.   3.  Definitely needs extended antibiotics for this,  at least some of it IV.  Start looking into options for this.  At least 2 weeks IV, then probably extended oral following that.  Staph aureus is a difficult pleural infection organism without better drainage, hopefully tube drainage will be adequate. Had large output last PM  4 BC neg Ok midline             Interval History:   no new complaints and doing well; no cp, sob, n/v/d, or abd pain. Feels well cxs same BC neg WBC 15 K  Large output with further TPA              Medications:       apixaban ANTICOAGULANT  2.5 mg Oral BID     ceFAZolin  2 g Intravenous Q12H     furosemide  40 mg Oral BID     insulin aspart  4 Units Subcutaneous QAM AC     insulin aspart  4 Units Subcutaneous Daily with lunch     insulin aspart  1-7 Units Subcutaneous TID AC     insulin aspart  1-5 Units Subcutaneous At Bedtime     insulin aspart  2 Units Subcutaneous Daily with supper     insulin glargine  14 Units Subcutaneous QAM     losartan  25 mg Oral Daily     potassium chloride ER  10 mEq Oral BID      "pravastatin  20 mg Oral Daily     sodium chloride (PF)  10 mL Intracatheter Q8H     umeclidinium  1 puff Inhalation Daily     verapamil ER  240 mg Oral At Bedtime                  Physical Exam:   Blood pressure 114/67, pulse 87, temperature 97.7  F (36.5  C), temperature source Oral, resp. rate 18, height 1.753 m (5' 9\"), weight 71.1 kg (156 lb 12 oz), SpO2 96 %.  Wt Readings from Last 2 Encounters:   07/15/20 71.1 kg (156 lb 12 oz)   07/10/20 67.5 kg (148 lb 14.4 oz)     Vital Signs with Ranges  Temp:  [97.2  F (36.2  C)-98.4  F (36.9  C)] 97.7  F (36.5  C)  Pulse:  [69-87] 87  Heart Rate:  [60-83] 81  Resp:  [16-18] 18  BP: (114-140)/(54-83) 114/67  SpO2:  [93 %-100 %] 96 %    Constitutional: Awake, alert, cooperative, no apparent distress   Lungs: Clear to auscultation bilaterally, no crackles or wheezing l CT sounds   Cardiovascular: Regular rate and rhythm, normal S1 and S2, and no murmur noted   Abdomen: Normal bowel sounds, soft, non-distended, non-tender   Skin: No rashes, no cyanosis, no edema   Other:           Data:   All microbiology laboratory data reviewed.  Recent Labs   Lab Test 07/17/20  0535 07/16/20  0740 07/15/20  0735   WBC 17.1* 16.8* 15.6*   HGB 10.1* 10.6* 10.6*   HCT 31.6* 34.0* 34.1*   MCV 86 87 87   * 654* 586*     Recent Labs   Lab Test 07/17/20  0535 07/16/20  0740 07/15/20  0735   CR 1.41* 1.38* 1.78*     Recent Labs   Lab Test 07/15/20  0735   SED 53*     Recent Labs   Lab Test 07/11/20  1500 07/10/20  2302 07/10/20  2221 05/21/20  1916 05/21/20  1423 05/12/20  1058 12/31/19  1008 11/07/19  1105 10/31/19  2144   CULT Moderate growth  Staphylococcus aureus  *  Critical Value/Significant Value, preliminary result only, called to and read back by  Alexandra Washburn RN 7.12.20 1357. ALEX   No growth No growth No growth No growth  No growth No growth No growth 10,000 to 50,000 colonies/mL  Candida albicans / dubliniensis  Candida albicans and Candida dubliniensis are not routinely " speciated  Susceptibility testing not routinely done  * No growth

## 2020-07-18 ENCOUNTER — HOME INFUSION (PRE-WILLOW HOME INFUSION) (OUTPATIENT)
Dept: PHARMACY | Facility: CLINIC | Age: 80
End: 2020-07-18

## 2020-07-18 VITALS
TEMPERATURE: 97.7 F | SYSTOLIC BLOOD PRESSURE: 98 MMHG | RESPIRATION RATE: 14 BRPM | HEART RATE: 72 BPM | WEIGHT: 156.75 LBS | OXYGEN SATURATION: 99 % | HEIGHT: 69 IN | DIASTOLIC BLOOD PRESSURE: 57 MMHG | BODY MASS INDEX: 23.22 KG/M2

## 2020-07-18 LAB
ANION GAP SERPL CALCULATED.3IONS-SCNC: 4 MMOL/L (ref 3–14)
BUN SERPL-MCNC: 19 MG/DL (ref 7–30)
CALCIUM SERPL-MCNC: 8.1 MG/DL (ref 8.5–10.1)
CHLORIDE SERPL-SCNC: 101 MMOL/L (ref 94–109)
CO2 SERPL-SCNC: 31 MMOL/L (ref 20–32)
CREAT SERPL-MCNC: 1.54 MG/DL (ref 0.66–1.25)
ERYTHROCYTE [DISTWIDTH] IN BLOOD BY AUTOMATED COUNT: 16.6 % (ref 10–15)
GFR SERPL CREATININE-BSD FRML MDRD: 42 ML/MIN/{1.73_M2}
GLUCOSE BLDC GLUCOMTR-MCNC: 189 MG/DL (ref 70–99)
GLUCOSE BLDC GLUCOMTR-MCNC: 214 MG/DL (ref 70–99)
GLUCOSE BLDC GLUCOMTR-MCNC: 226 MG/DL (ref 70–99)
GLUCOSE SERPL-MCNC: 196 MG/DL (ref 70–99)
HCT VFR BLD AUTO: 30.3 % (ref 40–53)
HGB BLD-MCNC: 9.7 G/DL (ref 13.3–17.7)
MCH RBC QN AUTO: 27.8 PG (ref 26.5–33)
MCHC RBC AUTO-ENTMCNC: 32 G/DL (ref 31.5–36.5)
MCV RBC AUTO: 87 FL (ref 78–100)
PLATELET # BLD AUTO: 564 10E9/L (ref 150–450)
POTASSIUM SERPL-SCNC: 4.1 MMOL/L (ref 3.4–5.3)
RBC # BLD AUTO: 3.49 10E12/L (ref 4.4–5.9)
SODIUM SERPL-SCNC: 136 MMOL/L (ref 133–144)
WBC # BLD AUTO: 12.5 10E9/L (ref 4–11)

## 2020-07-18 PROCEDURE — 25000128 H RX IP 250 OP 636

## 2020-07-18 PROCEDURE — 80048 BASIC METABOLIC PNL TOTAL CA: CPT | Performed by: HOSPITALIST

## 2020-07-18 PROCEDURE — 25000132 ZZH RX MED GY IP 250 OP 250 PS 637: Performed by: INTERNAL MEDICINE

## 2020-07-18 PROCEDURE — 40000239 ZZH STATISTIC VAT ROUNDS

## 2020-07-18 PROCEDURE — 00000146 ZZHCL STATISTIC GLUCOSE BY METER IP

## 2020-07-18 PROCEDURE — 25000132 ZZH RX MED GY IP 250 OP 250 PS 637: Performed by: PHYSICIAN ASSISTANT

## 2020-07-18 PROCEDURE — 25000131 ZZH RX MED GY IP 250 OP 636 PS 637: Performed by: INTERNAL MEDICINE

## 2020-07-18 PROCEDURE — 99239 HOSP IP/OBS DSCHRG MGMT >30: CPT | Performed by: INTERNAL MEDICINE

## 2020-07-18 PROCEDURE — 85027 COMPLETE CBC AUTOMATED: CPT | Performed by: HOSPITALIST

## 2020-07-18 RX ORDER — CEFAZOLIN SODIUM 1 G/3ML
2 INJECTION, POWDER, FOR SOLUTION INTRAMUSCULAR; INTRAVENOUS EVERY 12 HOURS
Qty: 1 EACH | DISCHARGE
Start: 2020-07-18 | End: 2020-08-01

## 2020-07-18 RX ADMIN — APIXABAN 2.5 MG: 2.5 TABLET, FILM COATED ORAL at 08:25

## 2020-07-18 RX ADMIN — LOSARTAN POTASSIUM 25 MG: 25 TABLET, FILM COATED ORAL at 08:25

## 2020-07-18 RX ADMIN — FUROSEMIDE 40 MG: 40 TABLET ORAL at 08:25

## 2020-07-18 RX ADMIN — POTASSIUM CHLORIDE 10 MEQ: 750 TABLET, EXTENDED RELEASE ORAL at 08:25

## 2020-07-18 RX ADMIN — CEFAZOLIN SODIUM 2 G: 2 INJECTION, SOLUTION INTRAVENOUS at 08:25

## 2020-07-18 RX ADMIN — INSULIN GLARGINE 14 UNITS: 100 INJECTION, SOLUTION SUBCUTANEOUS at 08:33

## 2020-07-18 RX ADMIN — PRAVASTATIN SODIUM 20 MG: 20 TABLET ORAL at 08:25

## 2020-07-18 RX ADMIN — CEFAZOLIN SODIUM 2 G: 2 INJECTION, SOLUTION INTRAVENOUS at 01:39

## 2020-07-18 RX ADMIN — UMECLIDINIUM 1 PUFF: 62.5 AEROSOL, POWDER ORAL at 08:25

## 2020-07-18 ASSESSMENT — ACTIVITIES OF DAILY LIVING (ADL)
ADLS_ACUITY_SCORE: 13

## 2020-07-18 NOTE — PLAN OF CARE
Physical Therapy Discharge Summary    Reason for therapy discharge:    Discharged to home.    Progress towards therapy goal(s). See goals on Care Plan in [facility] electronic health record for goal details.  Goals partially met.  Barriers to achieving goals:   discharge from facility.    Therapy recommendation(s):    Continued therapy is recommended.  Rationale/Recommendations:  PT will benefit from continued skilled rehab services to progress activity tolerance, strength and higher level balance for optimal safety with mobility.

## 2020-07-18 NOTE — PROGRESS NOTES
Pt alert and oriented, denies the need for pain medications, up with stand by assist, old chest tube site clean dry and intact, lungs clear but diminished, mid line IV access flushed and draws well,   Pt will discharge to home, pt given verbal and written discharge instructions, pt knows that follow up care is needed and knows who to call for any questions or concerns, medications list reviewed with pt, pt will have home infusion and out pt therapy, all question answered and pt is ready for discharge.

## 2020-07-18 NOTE — PLAN OF CARE
[id number]    VSS on RA, Norco for LUQ back puncture site pain awaiting reassessment, A/O x 4, puncture site CDI, CT site covered, intact. Lung sounds clear/diminished, CMS intact x baseline bilateral LE neuropathy. GI WDL, voids per urinal at bedside.

## 2020-07-18 NOTE — DISCHARGE SUMMARY
[facility]  Hospitalist Discharge Summary      Date of Admission:  7/10/2020  Date of Discharge:  7/18/2020  Discharging Provider: Susu Rodriguez MD      Discharge Diagnoses   Persistent left pleural effusion/empyema  S/B chest tube placement by IR on 7/11/2020  S/P tPa administration 7/16  Diabetes mellitus, on insulin  Stage III chronic kidney disease  Paroxysmal atrial fibrillation, on chronic anticoagulation with apixaban  Hypertension  Hyperlipidemia  COPD, not in acute exacerbation    Follow-ups Needed After Discharge   Follow-up Appointments     Follow-up and recommended labs and tests       Follow up with primary care provider, Charlie Finch, within 7 days   for hospital follow- up.  The following labs/tests are recommended: repeat   chest CT in 2 weeks to assess if any loculated fluid collection remains.    Follow up with Dr. Gonzalez or partner at [facility] regarding   chest CT results.         Please review dose of apixaban and adjust appropriately according to patient's renal function.    Unresulted Labs Ordered in the Past 30 Days of this Admission     No orders found from 6/10/2020 to 7/11/2020.          Discharge Disposition   Discharged to home with home health care  Condition at discharge: Stable      Hospital Course   Tc Garcia is a 80 year old male with PMH of recurrent left pleural effusion, afib (on chronic anticoagulation), DM, CKD, COPD who was admitted on 7/10/2020 with concerns for empyema.     He has had a persistent left pleural effusion with multiple admissions.  He was recently discharged and returned with more pulmonary complaints.  There was a concern of possible infection of the fluid.  Pulmonology was consulted and his pleux catheter was removed and a chest tube placed with fluid studies sent.  The fluid findings suggest infection.  Patient tested covid negative this admission and recently prior to this admission.        Assessment &  Plan:    Recurrent persistent left pleural effusion with studies suggestive of empyema  S/p chest tube placement by IR on 7/11/20  S/p lytics (tPa) 7/16:  -  s/p chest tube placement on 7/11 and then with decreasing output, tPa was administered on 7/16 with good drainage  - CT surgery and IR following  -had repeat thoracentesis 7/17, with repeat CT chest and removal of chest tube on 7/17  - remains afebrile, leukocytosis better but still up; wbc 28---17---13--17  - ID following, fluid culture positive with staph aureus; plan for IV ancef for at least 2 weeks, mid-line in place  - prn pain meds     DM:  - labile BS ranging from 80s--200s  - continue Lantus 14 units daily along with mealtime novolog of 4 unit(s) + sliding scale insulin   - monitor BS, hypoglycemia protocol in place     CKD stage 3:  - Monitor, avoid nephrotoxic meds as able.  - On chronic potassium and lasix.   - creat 1.5 - 1.7 which is his baseline     Parox afib on chronic anticoag with apixaban:  - Apixaban previously on hold with recent procedures.   - Continue verapamil  - continue apixiban 2.5 mg bid      HTN:  -  Controlled, Continue cozaar     Hyperlipidemia:  -  Continue lovastatin     COPD without an acute exacerbation:  -  Incruse Ellipta     COVID Status negative 7/10    Consultations This Hospital Stay   PULMONARY IP CONSULT  PHYSICAL THERAPY ADULT IP CONSULT  THORACIC SURGERY IP CONSULT  PHARMACY TO DOSE VANCO  INFECTIOUS DISEASES IP CONSULT  CARE TRANSITION RN/SW IP CONSULT  VASCULAR ACCESS ADULT IP CONSULT  VASCULAR ACCESS ADULT IP CONSULT    Code Status   Full Code    Time Spent on this Encounter   I, Susu Rodriguez MD, personally saw the patient today and spent greater than 30 minutes discharging this patient.       Susu Rodriguez MD  [facility]  ______________________________________________________________________    Physical Exam   Vital Signs: Temp: 97.7  F (36.5  C) Temp src: Oral BP: 98/57 Pulse: 72 Heart  Rate: 57 Resp: 14 SpO2: 99 % O2 Device: None (Room air)    Weight: 156 lbs 11.95 oz  I saw and examined the patient on the date of discharge..       Primary Care Physician   Charlie Finch    Discharge Orders      Home infusion referral      Physical Therapy Referral      Reason for your hospital stay    You had fluid around your lungs.     Follow-up and recommended labs and tests     Follow up with primary care provider, Charlie Finch, within 7 days for hospital follow- up.  The following labs/tests are recommended: repeat chest CT in 2 weeks to assess if any loculated fluid collection remains.  Follow up with Dr. Gonzalez or partner at [facility] regarding chest CT results.     Activity    Your activity upon discharge: activity as tolerated     Full Code     Diet    Follow this diet upon discharge: Orders Placed This Encounter      Regular Diet Adult       Significant Results and Procedures   Most Recent 3 CBC's:  Recent Labs   Lab Test 07/18/20  0600 07/17/20  0535 07/16/20  0740   WBC 12.5* 17.1* 16.8*   HGB 9.7* 10.1* 10.6*   MCV 87 86 87   * 589* 654*     Most Recent 6 Bacteria Isolates From Any Culture (See EPIC Reports for Culture Details):  Recent Labs   Lab Test 07/11/20  1500 07/10/20  2302 07/10/20  2221 05/21/20  1916 05/21/20  1423 05/12/20  1058   CULT Moderate growth  Staphylococcus aureus  *  Critical Value/Significant Value, preliminary result only, called to and read back by  Alexandra Washburn RN 7.12.20 1357. ALEX   No growth No growth No growth No growth  No growth No growth   ,   Results for orders placed or performed during the hospital encounter of 07/10/20   Chest XR,  PA & LAT    Narrative    EXAM: XR CHEST 2 VW  LOCATION: [facility]  DATE/TIME: 7/10/2020 10:23 PM    INDICATION: Weak, fever  COMPARISON: 07/08/2020      Impression    IMPRESSION: Interval increase in chronic left pleural effusion, now large with near complete collapse of the left lung.  Pleural drainage catheter in stable position. Right lung clear. No pneumothorax. Heart size obscured, probably within normal limits.   CT Chest w/o Contrast    Narrative    CT CHEST WITHOUT CONTRAST 7/11/2020 10:30 AM     HISTORY: Pleural effusion.    COMPARISON: May 22, 2020    TECHNIQUE: Volumetric helical acquisition of CT images of the chest  from the clavicles to the kidneys were acquired without IV contrast.  Radiation dose for this scan was reduced using automated exposure  control, adjustment of the mA and/or kV according to patient size, or  iterative reconstruction technique.    FINDINGS:  Large size loculated hydropneumothorax with drainage  catheter inferiorly. There is associated compressive atelectasis  versus less likely infiltrate on the left. Trace pleural fluid on the  right. No pericardial effusion. There are mild atherosclerotic changes  of the visualized aorta and its branches. There is no evidence of  aortic aneurysm. There are extensive coronary vascular calcifications  and/or stents consistent with coronary artery disease. No acute  findings in the visualized upper abdomen.      Impression    IMPRESSION: Large loculated left pleural effusion with pleural  catheter in place. Question whether the catheter is occluded or  excluded from the fluid due to loculation.    ALLYSON MARTINEZ MD   IR Chest Tube Removal Tunneled Left    Narrative    [facility]  DATE: 7/11/2020    PROCEDURE:  1. IMAGE GUIDED CHEST TUBE PLACEMENT  2. REMOVAL OF LEFT TUNNELED PLEURAL DRAINAGE CATHETER    INTERVENTIONAL RADIOLOGIST: Ashok Koroma MD    INDICATION: The patient is an 80-year-old male with a history of  recurrent left pleural effusion requiring tunneled pleural drainage  catheter placement which was performed on 5/12/2020. The patient now  presents with a history of erythema around the tract.    CONSENT: The risks, benefits and alternatives of chest tube placement  and pleural drainage catheter removal were  discussed with the patient   in detail. All questions were answered. Informed consent was given to  proceed with the procedure.    MODERATE SEDATION: Moderate sedation was not utilized. The patient was  given 50 mcg of IV sentinel for pain control.    CONTRAST: None.  ANTIBIOTICS: None.  ADDITIONAL MEDICATIONS: None.    FLUOROSCOPIC TIME: 1.9 minutes.  RADIATION DOSE: Air Kerma: 17 mGy.    COMPLICATIONS: No immediate complications.    STERILE BARRIER TECHNIQUE: Maximum sterile barrier technique was used.  Cutaneous antisepsis was performed at the operative site with  application of 2% chlorhexidine and large sterile drape. Prior to the  procedure, the  and assistant performed hand hygiene and wore  hat, mask, sterile gown, and sterile gloves during the entire  procedure.    PROCEDURE:  Utilizing ultrasoundimaging, a  image was obtained. The skin was  anesthetized using 1% lidocaine. A 19-gauge/4 Kazakh Drill Map  needle/catheter combination was advanced under image guidance into the  left pleural space. The needle was removed. Through the needle, a  0.035 inch Amplatz wire was coiled within the pleural space. The  catheter was removed. The tract was serially dilated. Over the wire, a  modified 14 Fr pigtail drainage catheter was placed. The pigtail  locking mechanism was engaged. The catheter was then attached to a  Pleur-Evac device. The catheter was secured to the skin utilizing a  2-0 nylon suture. A clean and sterile dressing was applied. The  patient tolerated the procedure well. 120 cc were aspirated and sent  for Gram stain and culture and on the left as ordered.     Attention was then turned to the tunneled left pleural drainage  catheter. The skin was anesthetized with 1% lidocaine. Utilizing  gentle traction, the tube was removed in its entirety. The skin was  then closed with a combination of 4-0 absorbable sutures and  Dermabond.    FINDINGS:   imaging demonstrated loculated left pleural  effusion.  Postplacement fluoroscopic imaging demonstrates good positioning of  the nonlocking straight 14 Salvadorean drainage catheter.     Uncomplicated removal of tunneled pleural drainage catheter without  residual fragment.      Impression    IMPRESSION:    1.  Uncomplicated image guided placement of modified straight  nonlocking left 14 Salvadorean pleural drainage catheter. Drainage catheter  attached to Pleur-Evac.   2.  Uncomplicated removal of left tunneled pleural drainage catheter.    ROSA KOROMA MD   IR Chest Tube Place Non Tunneled Left    Narrative    [facility]  DATE: 7/11/2020    PROCEDURE:  1. IMAGE GUIDED CHEST TUBE PLACEMENT  2. REMOVAL OF LEFT TUNNELED PLEURAL DRAINAGE CATHETER    INTERVENTIONAL RADIOLOGIST: Rosa Koroma MD    INDICATION: The patient is an 80-year-old male with a history of  recurrent left pleural effusion requiring tunneled pleural drainage  catheter placement which was performed on 5/12/2020. The patient now  presents with a history of erythema around the tract.    CONSENT: The risks, benefits and alternatives of chest tube placement  and pleural drainage catheter removal were discussed with the patient   in detail. All questions were answered. Informed consent was given to  proceed with the procedure.    MODERATE SEDATION: Moderate sedation was not utilized. The patient was  given 50 mcg of IV sentinel for pain control.    CONTRAST: None.  ANTIBIOTICS: None.  ADDITIONAL MEDICATIONS: None.    FLUOROSCOPIC TIME: 1.9 minutes.  RADIATION DOSE: Air Kerma: 17 mGy.    COMPLICATIONS: No immediate complications.    STERILE BARRIER TECHNIQUE: Maximum sterile barrier technique was used.  Cutaneous antisepsis was performed at the operative site with  application of 2% chlorhexidine and large sterile drape. Prior to the  procedure, the  and assistant performed hand hygiene and wore  hat, mask, sterile gown, and sterile gloves during the  entire  procedure.    PROCEDURE:  Utilizing ultrasoundimaging, a  image was obtained. The skin was  anesthetized using 1% lidocaine. A 19-gauge/4 Latvian Yueh  needle/catheter combination was advanced under image guidance into the  left pleural space. The needle was removed. Through the needle, a  0.035 inch Amplatz wire was coiled within the pleural space. The  catheter was removed. The tract was serially dilated. Over the wire, a  modified 14 Fr pigtail drainage catheter was placed. The pigtail  locking mechanism was engaged. The catheter was then attached to a  Pleur-Evac device. The catheter was secured to the skin utilizing a  2-0 nylon suture. A clean and sterile dressing was applied. The  patient tolerated the procedure well. 120 cc were aspirated and sent  for Gram stain and culture and on the left as ordered.     Attention was then turned to the tunneled left pleural drainage  catheter. The skin was anesthetized with 1% lidocaine. Utilizing  gentle traction, the tube was removed in its entirety. The skin was  then closed with a combination of 4-0 absorbable sutures and  Dermabond.    FINDINGS:   imaging demonstrated loculated left pleural effusion.  Postplacement fluoroscopic imaging demonstrates good positioning of  the nonlocking straight 14 Latvian drainage catheter.     Uncomplicated removal of tunneled pleural drainage catheter without  residual fragment.      Impression    IMPRESSION:    1.  Uncomplicated image guided placement of modified straight  nonlocking left 14 Latvian pleural drainage catheter. Drainage catheter  attached to Pleur-Evac.   2.  Uncomplicated removal of left tunneled pleural drainage catheter.    ROSA JORGENSEN MD   XR Chest Port 1 View    Narrative    CHEST ONE VIEW  7/12/2020 9:55 AM     HISTORY: Post chest tube placement.    COMPARISON: July 10, 2020      Impression    IMPRESSION: New pleural catheter noted on the left. Improved left  effusion. Question some ex vacuo  pleural air at the left base. Midlung  infiltrate unchanged. Right lung clear.    ALLYSON MARTINEZ MD   XR Chest Port 1 View    Narrative    CHEST PORTABLE ONE VIEW 7/15/2020 8:30 AM     HISTORY: Follow up empyema.    COMPARISON: 7/12/2020.      Impression    IMPRESSION: Drainage catheter at the left lower hemithorax is again  seen. Slight decrease in left mid and lower lung zone opacity,  presumably mostly pleural fluid. Some pleural fluid is again seen  extending over the left lung apex. Numerous left-sided rib fractures  posterolaterally are again noted. No pneumothorax. The right lung is  clear. Heart size is difficult to evaluate due to overlying opacity  but is probably unchanged. Vascular calcifications are noted.    WILNER ROQUE MD   CT Chest w/o Contrast    Narrative    CT CHEST WITHOUT CONTRAST 7/15/2020 2:12 PM     HISTORY: Pleural effusion. Staph empyema, reassess loculations and  need for lytic therapy.    COMPARISON: 7/11/2020    TECHNIQUE: Volumetric helical acquisition of CT images of the chest  from the clavicles to the kidneys were acquired without IV contrast.  Radiation dose for this scan was reduced using automated exposure  control, adjustment of the mA and/or kV according to patient size, or  iterative reconstruction technique.    FINDINGS: Moderate loculated hydropneumothorax on the left with a  pleural catheter at the base. Minimal right pleural effusion. Some  associated compressive atelectasis and/or infiltrate bilaterally, left  worse than right. No pericardial effusion. There are extensive  coronary vascular calcifications and/or stents consistent with  coronary artery disease. There are mild atherosclerotic changes of the  visualized aorta and its branches. There is no evidence of aortic  aneurysm. No acute findings in the visualized upper abdomen.      Impression    IMPRESSION: Moderate loculated left hydropneumothorax is only  minimally improved compared to previous with pleural  catheter in  place.    ALLYSON MARTINEZ MD   XR Chest 2 Views    Narrative    CHEST TWO VIEWS  7/16/2020 3:11 PM     HISTORY: 80-year-old patient with large output of chest tube.       Impression    IMPRESSION: Since July 15, 2020, significantly reduced left pleural  effusion and opacification identified. However, pneumothorax has been  introduced due to trapped lung at the lung base. Chest tube remains  within the pleural space, presumably pneumothorax segment of the  pleural space. Numerous posterior left rib fractures again noted.  Right lung is clear.    NEGAR DIGGS MD   XR Chest 2 Views    Narrative    CHEST TWO VIEWS 7/17/2020 8:09 AM     HISTORY: Followup after left chest tube lytic therapy.    COMPARISON: 7/16/2020.    FINDINGS: Left basilar pigtail chest tube again noted. Air and fluid  noted in the inferior left hemithorax, not significantly changed.  Pleural thickening over the apex of the left lung. Right lung is  clear.       Impression    IMPRESSION: Unchanged appearance of loculated appearing  hydropneumothorax on the left.    KENA FUNG MD   US Thoracentesis    Narrative    ULTRASOUND GUIDED THORACENTESIS  7/17/2020 12:40 PM     HISTORY: Residual left pleural effusion-Evaluate if thoracentesis  possible and perform is enough fluid.    FINDINGS: Ultrasound was used to evaluate for the presence and best  approach for drainage of a pleural effusion. There is no fluid  remaining at the left base. A small loculated fluid collection was  seen in the upper posterior left chest. Written and oral informed  consent was obtained. A pause for the cause procedure to verify the  correct patient and correct procedure. The skin overlying the left  chest posteriorly was prepped and draped in the usual sterile fashion.  The subcutaneous tissues were anesthetized with 10 ml of 1 percent  lidocaine injected. A catheter was advanced into the pleural space and  50 mL of  izabel colored fluid was drained. Patient was  monitored by  nurse under my direct supervision throughout the exam. Ultrasound  images were permanently stored.  There were no immediate  complications. Patient left the ultrasound suite in satisfactory  condition.      Impression    IMPRESSION: Technically successful thoracentesis without immediate  complications.   CT Chest w/o Contrast    Narrative    CT CHEST W/O CONTRAST 7/17/2020 1:10 PM    CLINICAL HISTORY: Empyema; Eval for any residual fluid. Considering  chest tube removal    TECHNIQUE: CT chest without IV contrast. Multiplanar reformats were  obtained. Dose reduction techniques were used.  CONTRAST: None.    COMPARISON: CT chest 7/15/2020    FINDINGS:   LUNGS AND PLEURA: Interval improvement of the previously seen  loculated left-sided hydropneumothorax with a markedly decreased fluid  component. The small air component is mildly increased. Left-sided  chest tube with the tip located along the posterior margin of the left  lower lobe and centered within an air component. Improved lingular and  left lower lobe atelectasis. Stable trace right-sided pleural effusion  with adjacent atelectasis. Stable right apical subpleural nodular  groundglass opacity measuring 9 mm.     MEDIASTINUM/AXILLAE: Stable prominent mediastinal lymph nodes which  are likely reactive. Stable heart size. Trace pericardial fluid.  Severe coronary artery calcifications.    UPPER ABDOMEN: Moderate to severe atherosclerosis.    MUSCULOSKELETAL: Multiple stable bilateral rib fractures. Stable mild  L1 compression deformity.      Impression    IMPRESSION:   1.  Left-sided loculated hydropneumothorax with marked interval  improvement of the fluid component. Improved lingular and left lower  lobe atelectasis. Note that the left-sided chest tube is centered  centered within an air pocket.  2.  Stable trace right-sided pleural effusion with adjacent  atelectasis.  3.  Additional stable findings as described above.    MARCO SILVA MD    IR Chest Tube Removal Non Tunneled Left    Narrative    EXAM: IR CHEST TUBE REMOVAL NON TUNNELED LEFT  7/17/2020 2:42 PM       HISTORY: History of left-sided empyema. Chest tube has drained nearly  all of the fluid. CT scan shows small loculations which will not drain  through the tube. Imaging was reviewed with Dr. Russ who  recommended removal of the chest tube.      PROCEDURE: After the risk/benefits were explained and informed consent  was obtained, Using sterile technique, the all-purpose drain was freed  from the local tissue using an Iris scissors.  The drain was then  removed without complications. Pressure was applied over the drain  site and a bandage with Tegaderm was placed. Estimated blood loss  during the procedure was less than 5 mL. No specimens collected. The  patient tolerated the procedure well.      Medications Used: none    FINDINGS: Technically successful removal of a left-sided chest tube.  The patient should continue to follow up with pulmonology and  continues antibiotic therapy.     MATTHEW NEWMAN PA-C       Discharge Medications     Allergies   Allergies   Allergen Reactions     No Known Drug Allergies         Review of your medicines      START taking      Dose / Directions   ceFAZolin 1 GM vial  Commonly known as:  ANCEF  Used for:  Empyema (H)      Dose:  2 g  Inject 2 g into the vein every 12 hours for 14 days CBC with differential, creatinine, SGOT weekly while on this medication to be faxed to Dr. Parra office.  Quantity:  1 each  Refills:  0        CONTINUE these medicines which have NOT CHANGED      Dose / Directions   acetaminophen 325 MG tablet  Commonly known as:  TYLENOL  Used for:  Pain      Dose:  650 mg  Take 2 tablets (650 mg) by mouth every 4 hours as needed for mild pain  Quantity:     Refills:  0     albuterol 108 (90 Base) MCG/ACT inhaler  Commonly known as:  PROAIR HFA/PROVENTIL HFA/VENTOLIN HFA      Dose:  2 puff  Inhale 2 puffs into the lungs every 4 hours as  needed for shortness of breath / dyspnea or wheezing Inhale 2 puffs every 4 to 6 hours as needed.  Refills:  0     apixaban ANTICOAGULANT 5 MG tablet  Commonly known as:  ELIQUIS  Used for:  Chronic atrial fibrillation (H)      Take 1/2 tablet (2.5mg) by mouth twice daily. You will have your labs checked on 5/4/20 and your PCP will direct you when it is safe to increase your dose back to 1 tablet (5mg) twice daily.  Refills:  0     furosemide 40 MG tablet  Commonly known as:  LASIX  Used for:  Recurrent pleural effusion on left      Dose:  40 mg  Take 1 tablet (40 mg) by mouth 2 times daily  Quantity:  180 tablet  Refills:  0     glucagon 1 MG kit      Dose:  1 mg  1 mg as needed for low blood sugar  Refills:  0     insulin aspart 100 UNIT/ML pen  Commonly known as:  NovoLOG PEN  Used for:  Diabetic ketoacidosis without coma associated with type 1 diabetes mellitus (H), Diabetic ketoacidosis without coma associated with diabetes mellitus due to underlying condition (H)      Inject 8 units subcutaneous with breakfast, 8 units subcutaneous with lunch and 4 units with supper.  Quantity:  3 mL  Refills:  0     insulin glargine 100 UNIT/ML pen  Commonly known as:  LANTUS PEN      Dose:  14 Units  Inject 14 Units Subcutaneous every morning  Refills:  0     losartan 25 MG tablet  Commonly known as:  COZAAR      Dose:  25 mg  Take 25 mg by mouth daily  Refills:  0     lovastatin 20 MG tablet  Commonly known as:  MEVACOR  Used for:  Mixed hyperlipidemia      1 TABLET DAILY AT DINNER  Quantity:  90 Tab  Refills:  0     nystatin 847476 UNIT/GM external cream  Commonly known as:  MYCOSTATIN      Apply topically 2 times daily as needed  Refills:  0     potassium chloride ER 10 MEQ CR tablet  Commonly known as:  KLOR-CON M  Used for:  Hypokalemia      Dose:  10 mEq  Take 1 tablet (10 mEq) by mouth 2 times daily  Quantity:  60 tablet  Refills:  0     tiotropium 18 MCG inhaled capsule  Commonly known as:  SPIRIVA      Dose:  18  mcg  Inhale 18 mcg into the lungs daily  Refills:  0     verapamil  MG 24 hr capsule  Commonly known as:  VERELAN  Used for:  Atrial fibrillation, unspecified type (H)      Dose:  240 mg  Take 1 capsule (240 mg) by mouth At Bedtime  Refills:  0

## 2020-07-18 NOTE — PROGRESS NOTES
Updated patient on discharge plan for today. Patient still in agreement with home infusion services and states he received a call from them and they are coming at 6 pm tonight to see him and teach him. Call out to Dr. Menchaca with ID to place antibiotic order. --[id number] discussed with Dr. Menchaca and she will enter the antibiotic for discharge.     1301 Updated home infusion and with new order for every 12 hours, they ask to not give any ancef this afternoon, ok to discharge patient. [facility]  will see him at 6 pm and do the antibiotic dose then. Patient and bedside RN updated.

## 2020-07-18 NOTE — PLAN OF CARE
A/Ox4. VSS on RA. BS active,+flatus, -bm.  LS diminished. Skin: L chest tube site CDI, pleurX drain site CDI. CMS: ex numbness in bilateral feet.. Pain: Denies. Up w/ ax1. Tolerating regular diet. Voiding adequately.

## 2020-07-18 NOTE — PLAN OF CARE
[id number]. A&O x4. VSS on room air. CMS intact. Lungs diminished on right. BS, BM-, flatus+. Incisions ERIN w/liquid bandage, chest tube site CDI, thoracentesis site CDI. Tolerating regular diet. Denies N/V. BGs 235. Voiding adequately. Norco for pain. Up w/SBA. Midline in RUE.

## 2020-07-18 NOTE — PROVIDER NOTIFICATION
Per pt request paged MD to see when they would come and see pt,  Pt anxious to see if he will be discharged

## 2020-07-20 ENCOUNTER — TELEPHONE (OUTPATIENT)
Dept: CARDIOLOGY | Facility: CLINIC | Age: 80
End: 2020-07-20

## 2020-07-20 NOTE — TELEPHONE ENCOUNTER
"----- Message from FORTUNATO Reynoso sent at 7/20/2020 12:55 PM CDT -----  Regarding: RE: discharged 7/18 - virtual visit tuesday 7/21  This time around it sounds like the majority of his issues are lung related, though did require diuresis as well.  Since he's only been out of the hospital a few days, I don't think a visit tomorrow for cardiology is necessarily needed.  Please call him and if he would like to postpone this phone visit he can. I'm not sure it will be very helpful for him, as virtual visits have been difficult in the past. If willing, I'd rather do an in person visit again in [place]. I think 8/11 is ok, if the patient is comfortable with that.  Cayla Dalal    ----- Message -----  From: Aide Husain RN  Sent: 7/20/2020   9:01 AM CDT  To: FORTUNATO Reynoso  Subject: discharged 7/18 - virtual visit tuesday 7/21     Katlin   Patient was discharged 7/18 Saturday with this plan:  Follow-up Appointments     Follow-up and recommended labs and tests       Follow up with primary care provider, Charlie Finch, within 7 days   for hospital follow- up.  The following labs/tests are recommended: repeat   chest CT in 2 weeks to assess if any loculated fluid collection remains.    Follow up with Dr. Gonzalez or partner at [facility] regarding   chest CT results.     Cardiology saw on prior admit w/discharge 7/10 -  March note 7/10 with plan for 1 week visit.  Virtual visit with you on 7/21 -  Would you like me to move to in person? If I move visit the next [place] day is PH clinic on 8/11 - let me know if you want me to move??  Corin     ====================    Call to patient. Left message. Corin Noble RN 07/20/20 1:30 PM    Called to patient - he resports he is \"feeling well\" at this time and is agreeable to move visit to 8/11/2020 with labs day prior. Scheduled.  Future Appointments   Date Time Provider Department Center   8/10/2020 10:30 AM [facility]   8/11/2020  1:10 PM " Katlin Fontanez PA [facility]   Corin Noble RN 07/20/20 2:11 PM

## 2020-07-23 LAB
AST SERPL W P-5'-P-CCNC: 21 U/L (ref 0–45)
BASOPHILS # BLD AUTO: 0.1 10E9/L (ref 0–0.2)
BASOPHILS NFR BLD AUTO: 0.3 %
BUN SERPL-MCNC: 15 MG/DL (ref 7–30)
CREAT SERPL-MCNC: 1.36 MG/DL (ref 0.66–1.25)
CRP SERPL-MCNC: 28 MG/L (ref 0–8)
DIFFERENTIAL METHOD BLD: ABNORMAL
EOSINOPHIL # BLD AUTO: 0.1 10E9/L (ref 0–0.7)
EOSINOPHIL NFR BLD AUTO: 0.7 %
ERYTHROCYTE [DISTWIDTH] IN BLOOD BY AUTOMATED COUNT: 16.6 % (ref 10–15)
ERYTHROCYTE [SEDIMENTATION RATE] IN BLOOD BY WESTERGREN METHOD: 51 MM/H (ref 0–20)
GFR SERPL CREATININE-BSD FRML MDRD: 48 ML/MIN/{1.73_M2}
HCT VFR BLD AUTO: 28.9 % (ref 40–53)
HGB BLD-MCNC: 8.9 G/DL (ref 13.3–17.7)
IMM GRANULOCYTES # BLD: 0.2 10E9/L (ref 0–0.4)
IMM GRANULOCYTES NFR BLD: 0.9 %
LYMPHOCYTES # BLD AUTO: 2 10E9/L (ref 0.8–5.3)
LYMPHOCYTES NFR BLD AUTO: 11.1 %
MCH RBC QN AUTO: 27 PG (ref 26.5–33)
MCHC RBC AUTO-ENTMCNC: 30.8 G/DL (ref 31.5–36.5)
MCV RBC AUTO: 88 FL (ref 78–100)
MONOCYTES # BLD AUTO: 0.7 10E9/L (ref 0–1.3)
MONOCYTES NFR BLD AUTO: 4.2 %
NEUTROPHILS # BLD AUTO: 14.5 10E9/L (ref 1.6–8.3)
NEUTROPHILS NFR BLD AUTO: 82.8 %
NRBC # BLD AUTO: 0 10*3/UL
NRBC BLD AUTO-RTO: 0 /100
PLATELET # BLD AUTO: 651 10E9/L (ref 150–450)
RBC # BLD AUTO: 3.3 10E12/L (ref 4.4–5.9)
WBC # BLD AUTO: 17.5 10E9/L (ref 4–11)

## 2020-07-23 PROCEDURE — 82565 ASSAY OF CREATININE: CPT | Performed by: INTERNAL MEDICINE

## 2020-07-23 PROCEDURE — 84450 TRANSFERASE (AST) (SGOT): CPT | Performed by: INTERNAL MEDICINE

## 2020-07-23 PROCEDURE — 84520 ASSAY OF UREA NITROGEN: CPT | Performed by: INTERNAL MEDICINE

## 2020-07-23 PROCEDURE — 85025 COMPLETE CBC W/AUTO DIFF WBC: CPT | Performed by: INTERNAL MEDICINE

## 2020-07-23 PROCEDURE — 85652 RBC SED RATE AUTOMATED: CPT | Performed by: INTERNAL MEDICINE

## 2020-07-23 PROCEDURE — 86140 C-REACTIVE PROTEIN: CPT | Performed by: INTERNAL MEDICINE

## 2020-07-24 ENCOUNTER — HOME INFUSION (PRE-WILLOW HOME INFUSION) (OUTPATIENT)
Dept: PHARMACY | Facility: CLINIC | Age: 80
End: 2020-07-24

## 2020-07-27 NOTE — PROGRESS NOTES
This is a recent snapshot of the patient's [facility] medical record.  For current drug dose and complete information and questions, call [phone] or In Basket pool, [facility] home infusion ([id number])  CSN Number:  [id number]

## 2020-07-30 ENCOUNTER — MEDICAL CORRESPONDENCE (OUTPATIENT)
Dept: HEALTH INFORMATION MANAGEMENT | Facility: CLINIC | Age: 80
End: 2020-07-30

## 2020-07-30 LAB
AST SERPL W P-5'-P-CCNC: 14 U/L (ref 0–45)
BASOPHILS # BLD AUTO: 0 10E9/L (ref 0–0.2)
BASOPHILS NFR BLD AUTO: 0.3 %
BUN SERPL-MCNC: 17 MG/DL (ref 7–30)
CREAT SERPL-MCNC: 1.26 MG/DL (ref 0.66–1.25)
CRP SERPL-MCNC: 18.8 MG/L (ref 0–8)
DIFFERENTIAL METHOD BLD: ABNORMAL
EOSINOPHIL # BLD AUTO: 0.2 10E9/L (ref 0–0.7)
EOSINOPHIL NFR BLD AUTO: 1.5 %
ERYTHROCYTE [DISTWIDTH] IN BLOOD BY AUTOMATED COUNT: 16.9 % (ref 10–15)
ERYTHROCYTE [SEDIMENTATION RATE] IN BLOOD BY WESTERGREN METHOD: 47 MM/H (ref 0–20)
GFR SERPL CREATININE-BSD FRML MDRD: 53 ML/MIN/{1.73_M2}
HCT VFR BLD AUTO: 29.2 % (ref 40–53)
HGB BLD-MCNC: 8.7 G/DL (ref 13.3–17.7)
IMM GRANULOCYTES # BLD: 0.1 10E9/L (ref 0–0.4)
IMM GRANULOCYTES NFR BLD: 0.5 %
LYMPHOCYTES # BLD AUTO: 1.3 10E9/L (ref 0.8–5.3)
LYMPHOCYTES NFR BLD AUTO: 12.7 %
MCH RBC QN AUTO: 26.5 PG (ref 26.5–33)
MCHC RBC AUTO-ENTMCNC: 29.8 G/DL (ref 31.5–36.5)
MCV RBC AUTO: 89 FL (ref 78–100)
MONOCYTES # BLD AUTO: 0.3 10E9/L (ref 0–1.3)
MONOCYTES NFR BLD AUTO: 3.3 %
NEUTROPHILS # BLD AUTO: 8.2 10E9/L (ref 1.6–8.3)
NEUTROPHILS NFR BLD AUTO: 81.7 %
NRBC # BLD AUTO: 0 10*3/UL
NRBC BLD AUTO-RTO: 0 /100
PLATELET # BLD AUTO: 462 10E9/L (ref 150–450)
RBC # BLD AUTO: 3.28 10E12/L (ref 4.4–5.9)
WBC # BLD AUTO: 10.1 10E9/L (ref 4–11)

## 2020-07-30 PROCEDURE — 86140 C-REACTIVE PROTEIN: CPT | Performed by: INTERNAL MEDICINE

## 2020-07-30 PROCEDURE — 82565 ASSAY OF CREATININE: CPT | Performed by: INTERNAL MEDICINE

## 2020-07-30 PROCEDURE — 84450 TRANSFERASE (AST) (SGOT): CPT | Performed by: INTERNAL MEDICINE

## 2020-07-30 PROCEDURE — 85652 RBC SED RATE AUTOMATED: CPT | Performed by: INTERNAL MEDICINE

## 2020-07-30 PROCEDURE — 84520 ASSAY OF UREA NITROGEN: CPT | Performed by: INTERNAL MEDICINE

## 2020-07-30 PROCEDURE — 85025 COMPLETE CBC W/AUTO DIFF WBC: CPT | Performed by: INTERNAL MEDICINE

## 2020-07-31 ENCOUNTER — HOME INFUSION (PRE-WILLOW HOME INFUSION) (OUTPATIENT)
Dept: PHARMACY | Facility: CLINIC | Age: 80
End: 2020-07-31

## 2020-08-03 NOTE — PROGRESS NOTES
This is a recent snapshot of the patient's [facility] medical record.  For current drug dose and complete information and questions, call [phone] or In Basket pool, [facility] home infusion ([id number])  CSN Number:  [id number]

## 2020-08-07 ENCOUNTER — TELEPHONE (OUTPATIENT)
Dept: CARDIOLOGY | Facility: CLINIC | Age: 80
End: 2020-08-07

## 2020-08-10 DIAGNOSIS — Z79.899 ON AMIODARONE THERAPY: ICD-10-CM

## 2020-08-10 LAB
ALBUMIN SERPL-MCNC: 2.9 G/DL (ref 3.4–5)
ALP SERPL-CCNC: 107 U/L (ref 40–150)
ALT SERPL W P-5'-P-CCNC: 11 U/L (ref 0–70)
ANION GAP SERPL CALCULATED.3IONS-SCNC: 11.7 MMOL/L (ref 6–17)
AST SERPL W P-5'-P-CCNC: 13 U/L (ref 0–45)
BILIRUB DIRECT SERPL-MCNC: 0.2 MG/DL (ref 0–0.2)
BILIRUB SERPL-MCNC: 0.4 MG/DL (ref 0.2–1.3)
BUN SERPL-MCNC: 16 MG/DL (ref 7–30)
CALCIUM SERPL-MCNC: 8.9 MG/DL (ref 8.5–10.5)
CHLORIDE SERPL-SCNC: 100 MMOL/L (ref 98–107)
CO2 SERPL-SCNC: 30 MMOL/L (ref 23–29)
CREAT SERPL-MCNC: 1.52 MG/DL (ref 0.7–1.3)
GFR SERPL CREATININE-BSD FRML MDRD: 44 ML/MIN/{1.73_M2}
GLUCOSE SERPL-MCNC: 133 MG/DL (ref 70–105)
POTASSIUM SERPL-SCNC: 3.7 MMOL/L (ref 3.5–5.1)
PROT SERPL-MCNC: 6.6 G/DL (ref 6.8–8.8)
SODIUM SERPL-SCNC: 138 MMOL/L (ref 136–145)
T4 FREE SERPL-MCNC: 1.11 NG/DL (ref 0.76–1.46)
TSH SERPL DL<=0.005 MIU/L-ACNC: 4.75 MU/L (ref 0.4–4)

## 2020-08-10 PROCEDURE — 36415 COLL VENOUS BLD VENIPUNCTURE: CPT | Performed by: INTERNAL MEDICINE

## 2020-08-10 PROCEDURE — 84439 ASSAY OF FREE THYROXINE: CPT | Performed by: NURSE PRACTITIONER

## 2020-08-10 PROCEDURE — 80076 HEPATIC FUNCTION PANEL: CPT | Performed by: NURSE PRACTITIONER

## 2020-08-10 PROCEDURE — 84443 ASSAY THYROID STIM HORMONE: CPT | Performed by: NURSE PRACTITIONER

## 2020-08-10 PROCEDURE — 80048 BASIC METABOLIC PNL TOTAL CA: CPT | Performed by: INTERNAL MEDICINE

## 2020-08-10 NOTE — PROGRESS NOTES
CARDIOLOGY [facility] TELEPHONE VISIT    Tc Gracia is a 80 year old male who is being evaluated via a billable telephone visit.      The patient has been notified of following:     This telephone visit will be conducted via a call between you and your physician/provider. Given concern for spread of COVID 19 we are minimizing in person clinic visits when possible. We have found that certain health care needs can be provided without the need for a physical exam.  This service lets us provide the care you need with a short phone conversation.  If a prescription is necessary we can send it directly to your pharmacy.  If lab work is needed we can place an order for that and you can then stop by our lab to have the test done at a later time. If during the course of the call the physician/provider feels a telephone visit is not appropriate, you will not be charged for this service.    Telephone visits are billed at different rates depending on your insurance coverage. During this emergency period, for some insurers they may be billed the same as an in-person visit.  Please reach out to your insurance provider with any questions.    Patient has given verbal consent for Telephone visit?  Yes      I have reviewed and updated the patient's Past Medical History, Social History, Family History and Medication List.      MEDICATIONS:  Current Outpatient Medications   Medication Sig Dispense Refill     acetaminophen (TYLENOL) 500 MG tablet Take 1,000 mg by mouth 3 times daily       albuterol (PROAIR HFA/PROVENTIL HFA/VENTOLIN HFA) 108 (90 Base) MCG/ACT inhaler Inhale 2 puffs into the lungs every 4 hours as needed for shortness of breath / dyspnea or wheezing Inhale 2 puffs every 4 to 6 hours as needed.       apixaban ANTICOAGULANT (ELIQUIS) 5 MG tablet Take 1/2 tablet (2.5mg) by mouth twice daily. You will have your labs checked on 5/4/20 and your PCP will direct you when it is safe to increase your dose back to 1 tablet (5mg)  twice daily.       furosemide (LASIX) 40 MG tablet Take 1 tablet (40 mg) by mouth 2 times daily 180 tablet 0     glucagon 1 MG kit 1 mg as needed for low blood sugar       insulin aspart (NOVOLOG PEN) 100 UNIT/ML pen Inject 8 units subcutaneous with breakfast, 8 units subcutaneous with lunch and 4 units with supper. 3 mL 0     insulin glargine (LANTUS PEN) 100 UNIT/ML pen Inject 14 Units Subcutaneous every morning       losartan (COZAAR) 25 MG tablet Take 25 mg by mouth daily       LOVASTATIN 20 MG PO TABS 1 TABLET DAILY AT DINNER 90 Tab 0     nystatin (MYCOSTATIN) 643232 UNIT/GM external cream Apply topically 2 times daily as needed        potassium chloride ER (KLOR-CON M) 10 MEQ CR tablet Take 1 tablet (10 mEq) by mouth 2 times daily 60 tablet 0     tiotropium (SPIRIVA) 18 MCG inhaled capsule Inhale 18 mcg into the lungs daily       verapamil ER (VERELAN) 240 MG 24 hr capsule Take 1 capsule (240 mg) by mouth At Bedtime         ALLERGIES  No known drug allergies      Review Of Systems:  Skin: negative  Eyes: positive for glasses  Ears/Nose/Throat: negative  Respiratory: Some shortness of breath with activity  Cardiovascular: bilateral edema  Gastrointestinal: negative  Musculoskeletal: negative  Neurologic: negative  Psychiatric: negative  Hematologic/Lymphatic/Immunologic: negative  Endocrine:  Diabetes Type II  (ROS TAKEN BY MOHSEN Martinez   PRIOR TO VISIT)    Self reported vitals:  Weight: 155.0 lb  /88  HR 62          Most recent labs:   Results for CEDRICK PHILLIPS (MRN [id number]) as of 8/11/2020 13:26   Ref. Range 8/10/2020 10:27   Sodium Latest Ref Range: 136 - 145 mmol/L 138   Potassium Latest Ref Range: 3.5 - 5.1 mmol/L 3.7   Chloride Latest Ref Range: 98 - 107 mmol/L 100   Carbon Dioxide Latest Ref Range: 23 - 29 mmol/L 30 (H)   Urea Nitrogen Latest Ref Range: 7 - 30 mg/dL 16   Creatinine Latest Ref Range: 0.70 - 1.30 mg/dL 1.52 (H)   GFR Estimate Latest Ref Range: >60 mL/min/1.73_m2 44 (L)    GFR Estimate If Black Latest Ref Range: >60 mL/min/1.73_m2 54 (L)   Calcium Latest Ref Range: 8.5 - 10.5 mg/dL 8.9   Anion Gap Latest Ref Range: 6 - 17 mmol/L 11.7   Albumin Latest Ref Range: 3.4 - 5.0 g/dL 2.9 (L)   Protein Total Latest Ref Range: 6.8 - 8.8 g/dL 6.6 (L)   Bilirubin Total Latest Ref Range: 0.2 - 1.3 mg/dL 0.4   Alkaline Phosphatase Latest Ref Range: 40 - 150 U/L 107   ALT Latest Ref Range: 0 - 70 U/L 11   AST Latest Ref Range: 0 - 45 U/L 13   Bilirubin Direct Latest Ref Range: 0.0 - 0.2 mg/dL 0.2   T4 Free Latest Ref Range: 0.76 - 1.46 ng/dL 1.11   TSH Latest Ref Range: 0.40 - 4.00 mU/L 4.75 (H)   Glucose Latest Ref Range: 70 - 105 mg/dL 133 (H)         Primary cardiologist: Dr. Julien (general), Dr. Camejo (EP), Dr. Bonilla ()        HPI:  Mr. Garcia is an 80 year old male with a PMhx including poorly controlled diabetes, HTN, chronic anemia, recurrent left pleural effusion, paroxysmal atrial fibrillation/flutter, and HFpEF with pulmonary hypertension. He was hospitalized in early May 2020 with progressive dyspnea and due to recurrent (transudative) pleural effusion a left PleurX catheter was placed. He was diuresed with IV furosemide and given empiric abx therapy. Echo showed normal LVEF 55-60% with normal wall motion. The RV was moderately dilated with decreased systolic function and mild TR. He underwent a RHC during that stay that showed mild PH (PA pressure of 28 mmHg (51/15), mean RA of 4mmHg, and mildly elevated filling pressures with a wedge of 12mmHg (Vwave 16). PVR was 3.9 Wood units and Carlos Manuel CO/CI was 4.09/2.23. With vasodilator challenge, his PVR normalized to 1.22 wood units. Therefore, he was deemed to have pulmonary hypertension out of proportion to his left heart disease. He was started on Cialis 5mg daily but because this was not a formulated preparation, was switched to sildenafil 20 mg 3 times daily and discharged home. Interestingly, he was not discharged on any diuretics.  Discharge wt was 152 lbs. His admission was complicated by atrial flutter for which he was given amiodarone but because of prolongation in QTc, this was discontinued, and he was continued on his PTA verapamil.       Following discharge he developed symptomatic hypotension and via phone was told to stop the sildenafil. He then saw Dr. Chad gusman for PH evaluation a few days later. He reported hypertension with SBP >200mmHg and weight was up 12 lbs from discharge. Torsemide 10 mg daily, Losartan 25 mg daily, and KCL 40 mEq were all started. She recommended resuming sildenafil once he was felt to be euvolemic.       Unfortunately, he was hospitalized again from 5/21 to 5/29. There was concern regarding his overall cognition vs confusion due to fluctuating blood sugars. It was noted that the medications he was truly taking were much different than what we had been attempting to adjust. His insulin pump was removed and regimen adjusted. A repeat chest CT showed no pneumonia and improvement in his effusions since placement of his pleurex. He again required IV lasix and was discharged on Lasix 40mg BID in place of the torsemide. Due to renal concerns, his hydrochlorothiazide and losartan were held. Fortunately, home health was arranged to help him manage his medications at home.      I saw Mr. Garcia in the [facility] clinic in person on 6/5/2020 and have been following him closely since that time. We have been trying adjust his diuretics periodically, though this has been complicated by fluctuating renal function. In addition, his blood sugar control has remained an issue. We tried to reduce his lasix due to renal function but this resulted in rapid weight gain. When I saw him on 6/16 his weight was up to 156 lbs once again, but he reported feeling much better after an iron infusion.  He admitted to not wearing his compression socks daily and was also noted to maintain a fairly high salt diet which I educated him about.  "    I saw Tc back last on 7/8. His breathing had significantly worsened, and weight was up further to 159 lbs. In addition, his pleurex was no longer draining fluid. Ultimately, I sent him to the ED for further evaluation. He was hospitalized from 7/8 to 7/10, at which time IR evaluated his pleurex and was able to place to suction with 550ml fluid removed. He was also diuresed with good result. Unfortunately, he returned back later that evening due to weakness and a fall, having hit his head. He was then hospitalized through 7/18. He was found to have an empyema and received antibiotics. His pleurex was removed and a chest tube was placed. He received IV abx and this has since been changed to PO. It appears that from a fluid standpoint things remained at baseline.    Today, we are doing a virtual follow up after his hospital stay. The video visit was converted to phone due to difficulties with the video link. He tells me that overall he is doing well. He says he was discharged home at 154 lbs, and today he is 155 lbs. He continues with lower extremity edema but says \"no worse than usual\" and that it isn't bothersome to him. He admits he doesn't routinely wear the compression socks, but is \"trying his best\" to watch salt. He says his breathing is overall much better, and denies orthopnea, dizziness, or further presyncope/falls. His only complaint today actually revolves around back pain. The last week or so he has hardly been able to get out of bed; however, a few days ago he sought evaluation at [facility] and was placed on 1000mg TID of Tylenol which he says has helped greatly. He is now able to ambulate, and has an appt tomorrow with a back specialist for further evaluation. Labs done yesterday show a slight decline in renal function, with SCr of 1.52, though BUN remains stable at 16. His electrolytes are acceptable as well. His TSH was slightly giovanni at 4.75, but free T4 normal.         Assessment/Plan:     1. Acute " on chronic HFpEF.              --Overall, his situation has been complex. His most recent echo from May 2020 shows preserved EF 55-60%, with normal wall motion. RV was mild to moderately dilated with mildly decreased RV function. He had a RHC during his hospital stay in May as below.               --He has had recurrent hospitalizations in the setting of intermittent volume overload but also a recurrent pleural effusion/empyema. Clinically he states he is feeling much better after treatment of the empyema; he is now completing a course of PO antibiotics and tells me a recent chest CT did not show reaccumulation of fluid. He reports a home weight of 155lbs today which he says is stable since hospital discharge. He believes his lower extremity edema is at baseline and certainly no worse.    --As he's feeling well, I kept his lasix at 40mg BID, but we will continue to monitor his renal function. I will try to get him back into clinic for an in person visit in the next 1-2 weeks for close monitoring.  I encouraged him to be more compliant with compression sock use and a low sodium diet.                   2. Pulmonary hypertension.              --RHC in May 2020 showed PA pressure of 28 mmHg (51/15), mean RA of 4mmHg, and mildly elevated filling pressures with a wedge of 12mmHg (Vwave 16). PVR was 3.9 Wood units and Carlos Manuel CO/CI was 4.09/2.23. With vasodilator challenge, his PVR normalized to 1.22 wood units. Therefore, he was deemed to have pulmonary hypertension out of proportion to his left heart disease.              --As per Dr. Bonilla's recommendations, once he is euvolemic, our plan is to potentially reintroduce sildenafil. However, will wait until he stabilizes for a bit before making any significant changes.               --In the future, when felt safe from a COVID standpoint, he could benefit from pulmonary rehab.      3. Paroxysmal atrial fibrillation/flutter.              --Historically it appears this has  recently been rate controlled. He is not currently on beta blockers, and remains on verapamil which I believe has been longstanding for him. Had a prolonged QTC on amiodarone in the past.               --He is on anticoagulation with apixaban 2.5mg BID due to age and renal function.     4. Hypertension.              --BP today under good control at home. He is now back on losartan for renal protection, along with the verapamil and diuretics as above.        Follow up plan: Will try to get him back in clinic in the next 1-2 weeks for close monitoring.       I have reviewed the note as documented above.  This accurately captures the substance of my conversation with the patient.        Phone call contact time  Call Started at 1326  Call Ended at 1345    Katlin Fontanez PA-C  [facility]  Pager [phone]

## 2020-08-11 ENCOUNTER — VIRTUAL VISIT (OUTPATIENT)
Dept: CARDIOLOGY | Facility: CLINIC | Age: 80
End: 2020-08-11
Attending: NURSE PRACTITIONER
Payer: COMMERCIAL

## 2020-08-11 VITALS
HEIGHT: 70 IN | HEART RATE: 62 BPM | WEIGHT: 155 LBS | BODY MASS INDEX: 22.19 KG/M2 | SYSTOLIC BLOOD PRESSURE: 110 MMHG | DIASTOLIC BLOOD PRESSURE: 88 MMHG | OXYGEN SATURATION: 99 %

## 2020-08-11 DIAGNOSIS — I48.91 ATRIAL FIBRILLATION/FLUTTER (H): ICD-10-CM

## 2020-08-11 DIAGNOSIS — I48.92 ATRIAL FIBRILLATION/FLUTTER (H): ICD-10-CM

## 2020-08-11 DIAGNOSIS — E78.5 HYPERLIPIDEMIA LDL GOAL <100: ICD-10-CM

## 2020-08-11 DIAGNOSIS — I10 ESSENTIAL HYPERTENSION: ICD-10-CM

## 2020-08-11 DIAGNOSIS — I50.9 ACUTE ON CHRONIC CONGESTIVE HEART FAILURE, UNSPECIFIED HEART FAILURE TYPE (H): ICD-10-CM

## 2020-08-11 DIAGNOSIS — I27.20 PULMONARY HYPERTENSION (H): ICD-10-CM

## 2020-08-11 DIAGNOSIS — R06.02 SHORTNESS OF BREATH: Primary | ICD-10-CM

## 2020-08-11 PROCEDURE — 99213 OFFICE O/P EST LOW 20 MIN: CPT | Mod: 95 | Performed by: PHYSICIAN ASSISTANT

## 2020-08-11 RX ORDER — ACETAMINOPHEN 500 MG
1000 TABLET ORAL 3 TIMES DAILY
Status: ON HOLD | COMMUNITY
End: 2020-10-27

## 2020-08-11 ASSESSMENT — MIFFLIN-ST. JEOR: SCORE: 1414.33

## 2020-08-11 NOTE — LETTER
8/11/2020    Charlie Finch MD  [address] [facility], [place] [address]  [facility] [id number]    RE: Tc Garcia       Dear Colleague,    I had the pleasure of seeing Tc Garcia in the [facility].    [facility]ARDIOLOGY [facility] TELEPHONE VISIT    Tc Garcia is a 80 year old male who is being evaluated via a billable telephone visit.      The patient has been notified of following:     This telephone visit will be conducted via a call between you and your physician/provider. Given concern for spread of COVID 19 we are minimizing in person clinic visits when possible. We have found that certain health care needs can be provided without the need for a physical exam.  This service lets us provide the care you need with a short phone conversation.  If a prescription is necessary we can send it directly to your pharmacy.  If lab work is needed we can place an order for that and you can then stop by our lab to have the test done at a later time. If during the course of the call the physician/provider feels a telephone visit is not appropriate, you will not be charged for this service.    Telephone visits are billed at different rates depending on your insurance coverage. During this emergency period, for some insurers they may be billed the same as an in-person visit.  Please reach out to your insurance provider with any questions.    Patient has given verbal consent for Telephone visit?  Yes      I have reviewed and updated the patient's Past Medical History, Social History, Family History and Medication List.      MEDICATIONS:  Current Outpatient Medications   Medication Sig Dispense Refill     acetaminophen (TYLENOL) 500 MG tablet Take 1,000 mg by mouth 3 times daily       albuterol (PROAIR HFA/PROVENTIL HFA/VENTOLIN HFA) 108 (90 Base) MCG/ACT inhaler Inhale 2 puffs into the lungs every 4 hours as needed for shortness of breath / dyspnea or wheezing Inhale 2 puffs every 4 to 6 hours as needed.        apixaban ANTICOAGULANT (ELIQUIS) 5 MG tablet Take 1/2 tablet (2.5mg) by mouth twice daily. You will have your labs checked on 5/4/20 and your PCP will direct you when it is safe to increase your dose back to 1 tablet (5mg) twice daily.       furosemide (LASIX) 40 MG tablet Take 1 tablet (40 mg) by mouth 2 times daily 180 tablet 0     glucagon 1 MG kit 1 mg as needed for low blood sugar       insulin aspart (NOVOLOG PEN) 100 UNIT/ML pen Inject 8 units subcutaneous with breakfast, 8 units subcutaneous with lunch and 4 units with supper. 3 mL 0     insulin glargine (LANTUS PEN) 100 UNIT/ML pen Inject 14 Units Subcutaneous every morning       losartan (COZAAR) 25 MG tablet Take 25 mg by mouth daily       LOVASTATIN 20 MG PO TABS 1 TABLET DAILY AT DINNER 90 Tab 0     nystatin (MYCOSTATIN) 994828 UNIT/GM external cream Apply topically 2 times daily as needed        potassium chloride ER (KLOR-CON M) 10 MEQ CR tablet Take 1 tablet (10 mEq) by mouth 2 times daily 60 tablet 0     tiotropium (SPIRIVA) 18 MCG inhaled capsule Inhale 18 mcg into the lungs daily       verapamil ER (VERELAN) 240 MG 24 hr capsule Take 1 capsule (240 mg) by mouth At Bedtime         ALLERGIES  No known drug allergies      Review Of Systems:  Skin: negative  Eyes: positive for glasses  Ears/Nose/Throat: negative  Respiratory: Some shortness of breath with activity  Cardiovascular: bilateral edema  Gastrointestinal: negative  Musculoskeletal: negative  Neurologic: negative  Psychiatric: negative  Hematologic/Lymphatic/Immunologic: negative  Endocrine:  Diabetes Type II  (ROS TAKEN BY MOHSEN aMrtinez   PRIOR TO VISIT)    Self reported vitals:  Weight: 155.0 lb  /88  HR 62          Most recent labs:   Results for CEDRICK PHILLIPS (MRN [id number]) as of 8/11/2020 13:26   Ref. Range 8/10/2020 10:27   Sodium Latest Ref Range: 136 - 145 mmol/L 138   Potassium Latest Ref Range: 3.5 - 5.1 mmol/L 3.7   Chloride Latest Ref Range: 98 - 107 mmol/L 100    Carbon Dioxide Latest Ref Range: 23 - 29 mmol/L 30 (H)   Urea Nitrogen Latest Ref Range: 7 - 30 mg/dL 16   Creatinine Latest Ref Range: 0.70 - 1.30 mg/dL 1.52 (H)   GFR Estimate Latest Ref Range: >60 mL/min/1.73_m2 44 (L)   GFR Estimate If Black Latest Ref Range: >60 mL/min/1.73_m2 54 (L)   Calcium Latest Ref Range: 8.5 - 10.5 mg/dL 8.9   Anion Gap Latest Ref Range: 6 - 17 mmol/L 11.7   Albumin Latest Ref Range: 3.4 - 5.0 g/dL 2.9 (L)   Protein Total Latest Ref Range: 6.8 - 8.8 g/dL 6.6 (L)   Bilirubin Total Latest Ref Range: 0.2 - 1.3 mg/dL 0.4   Alkaline Phosphatase Latest Ref Range: 40 - 150 U/L 107   ALT Latest Ref Range: 0 - 70 U/L 11   AST Latest Ref Range: 0 - 45 U/L 13   Bilirubin Direct Latest Ref Range: 0.0 - 0.2 mg/dL 0.2   T4 Free Latest Ref Range: 0.76 - 1.46 ng/dL 1.11   TSH Latest Ref Range: 0.40 - 4.00 mU/L 4.75 (H)   Glucose Latest Ref Range: 70 - 105 mg/dL 133 (H)         Primary cardiologist: Dr. Julien (general), Dr. Camejo (EP), Dr. Bonilla ()        HPI:  Mr. Garcia is an 80 year old male with a PMhx including poorly controlled diabetes, HTN, chronic anemia, recurrent left pleural effusion, paroxysmal atrial fibrillation/flutter, and HFpEF with pulmonary hypertension. He was hospitalized in early May 2020 with progressive dyspnea and due to recurrent (transudative) pleural effusion a left PleurX catheter was placed. He was diuresed with IV furosemide and given empiric abx therapy. Echo showed normal LVEF 55-60% with normal wall motion. The RV was moderately dilated with decreased systolic function and mild TR. He underwent a RHC during that stay that showed mild PH (PA pressure of 28 mmHg (51/15), mean RA of 4mmHg, and mildly elevated filling pressures with a wedge of 12mmHg (Vwave 16). PVR was 3.9 Wood units and Carlos Manuel CO/CI was 4.09/2.23. With vasodilator challenge, his PVR normalized to 1.22 wood units. Therefore, he was deemed to have pulmonary hypertension out of proportion to his left heart  disease. He was started on Cialis 5mg daily but because this was not a formulated preparation, was switched to sildenafil 20 mg 3 times daily and discharged home. Interestingly, he was not discharged on any diuretics. Discharge wt was 152 lbs. His admission was complicated by atrial flutter for which he was given amiodarone but because of prolongation in QTc, this was discontinued, and he was continued on his PTA verapamil.       Following discharge he developed symptomatic hypotension and via phone was told to stop the sildenafil. He then saw Dr. Chad gusman for PH evaluation a few days later. He reported hypertension with SBP >200mmHg and weight was up 12 lbs from discharge. Torsemide 10 mg daily, Losartan 25 mg daily, and KCL 40 mEq were all started. She recommended resuming sildenafil once he was felt to be euvolemic.       Unfortunately, he was hospitalized again from 5/21 to 5/29. There was concern regarding his overall cognition vs confusion due to fluctuating blood sugars. It was noted that the medications he was truly taking were much different than what we had been attempting to adjust. His insulin pump was removed and regimen adjusted. A repeat chest CT showed no pneumonia and improvement in his effusions since placement of his pleurex. He again required IV lasix and was discharged on Lasix 40mg BID in place of the torsemide. Due to renal concerns, his hydrochlorothiazide and losartan were held. Fortunately, home health was arranged to help him manage his medications at home.      I saw Mr. Garcia in the [facility] clinic in person on 6/5/2020 and have been following him closely since that time. We have been trying adjust his diuretics periodically, though this has been complicated by fluctuating renal function. In addition, his blood sugar control has remained an issue. We tried to reduce his lasix due to renal function but this resulted in rapid weight gain. When I saw him on 6/16 his weight was up to 156  "lbs once again, but he reported feeling much better after an iron infusion.  He admitted to not wearing his compression socks daily and was also noted to maintain a fairly high salt diet which I educated him about.     I saw Tc back last on 7/8. His breathing had significantly worsened, and weight was up further to 159 lbs. In addition, his pleurex was no longer draining fluid. Ultimately, I sent him to the ED for further evaluation. He was hospitalized from 7/8 to 7/10, at which time IR evaluated his pleurex and was able to place to suction with 550ml fluid removed. He was also diuresed with good result. Unfortunately, he returned back later that evening due to weakness and a fall, having hit his head. He was then hospitalized through 7/18. He was found to have an empyema and received antibiotics. His pleurex was removed and a chest tube was placed. He received IV abx and this has since been changed to PO. It appears that from a fluid standpoint things remained at baseline.    Today, we are doing a virtual follow up after his hospital stay. The video visit was converted to phone due to difficulties with the video link. He tells me that overall he is doing well. He says he was discharged home at 154 lbs, and today he is 155 lbs. He continues with lower extremity edema but says \"no worse than usual\" and that it isn't bothersome to him. He admits he doesn't routinely wear the compression socks, but is \"trying his best\" to watch salt. He says his breathing is overall much better, and denies orthopnea, dizziness, or further presyncope/falls. His only complaint today actually revolves around back pain. The last week or so he has hardly been able to get out of bed; however, a few days ago he sought evaluation at [facility] and was placed on 1000mg TID of Tylenol which he says has helped greatly. He is now able to ambulate, and has an appt tomorrow with a back specialist for further evaluation. Labs done yesterday show a " slight decline in renal function, with SCr of 1.52, though BUN remains stable at 16. His electrolytes are acceptable as well. His TSH was slightly giovanni at 4.75, but free T4 normal.         Assessment/Plan:     1. Acute on chronic HFpEF.              --Overall, his situation has been complex. His most recent echo from May 2020 shows preserved EF 55-60%, with normal wall motion. RV was mild to moderately dilated with mildly decreased RV function. He had a RHC during his hospital stay in May as below.               --He has had recurrent hospitalizations in the setting of intermittent volume overload but also a recurrent pleural effusion/empyema. Clinically he states he is feeling much better after treatment of the empyema; he is now completing a course of PO antibiotics and tells me a recent chest CT did not show reaccumulation of fluid. He reports a home weight of 155lbs today which he says is stable since hospital discharge. He believes his lower extremity edema is at baseline and certainly no worse.    --As he's feeling well, I kept his lasix at 40mg BID, but we will continue to monitor his renal function. I will try to get him back into clinic for an in person visit in the next 1-2 weeks for close monitoring.  I encouraged him to be more compliant with compression sock use and a low sodium diet.                   2. Pulmonary hypertension.              --RHC in May 2020 showed PA pressure of 28 mmHg (51/15), mean RA of 4mmHg, and mildly elevated filling pressures with a wedge of 12mmHg (Vwave 16). PVR was 3.9 Wood units and Carlos Manuel CO/CI was 4.09/2.23. With vasodilator challenge, his PVR normalized to 1.22 wood units. Therefore, he was deemed to have pulmonary hypertension out of proportion to his left heart disease.              --As per Dr. Bonilla's recommendations, once he is euvolemic, our plan is to potentially reintroduce sildenafil. However, will wait until he stabilizes for a bit before making any significant  changes.               --In the future, when felt safe from a COVID standpoint, he could benefit from pulmonary rehab.      3. Paroxysmal atrial fibrillation/flutter.              --Historically it appears this has recently been rate controlled. He is not currently on beta blockers, and remains on verapamil which I believe has been longstanding for him. Had a prolonged QTC on amiodarone in the past.               --He is on anticoagulation with apixaban 2.5mg BID due to age and renal function.     4. Hypertension.              --BP today under good control at home. He is now back on losartan for renal protection, along with the verapamil and diuretics as above.        Follow up plan: Will try to get him back in clinic in the next 1-2 weeks for close monitoring.       I have reviewed the note as documented above.  This accurately captures the substance of my conversation with the patient.      Phone call contact time  Call Started at 1326  Call Ended at 1345    Katlin Fontanez PA-C  [facility]  Pager [phone]        Thank you for allowing me to participate in the care of your patient.    Sincerely,     FORTUNATO Reynoso     [facility]

## 2020-08-11 NOTE — PATIENT INSTRUCTIONS
Visit Summary:    Today we discussed:   Please continue to weigh at home daily and call with an upward trend or more swelling.  Please try to wear your compression socks daily.  Keep a low sodium diet, <2500mg if possible.     Medication changes:    None today.     Follow up:   With Ame Mejía PA-C or Dr. Bonilla in 1-2 weeks in person. Our schedulers will call you to arrange.    Please call my nurse Corin at [phone] with any questions or concerns.

## 2020-08-19 ENCOUNTER — OFFICE VISIT (OUTPATIENT)
Dept: CARDIOLOGY | Facility: CLINIC | Age: 80
End: 2020-08-19
Payer: COMMERCIAL

## 2020-08-19 VITALS
BODY MASS INDEX: 23.85 KG/M2 | WEIGHT: 161 LBS | OXYGEN SATURATION: 98 % | TEMPERATURE: 96.4 F | HEIGHT: 69 IN | DIASTOLIC BLOOD PRESSURE: 81 MMHG | SYSTOLIC BLOOD PRESSURE: 142 MMHG | HEART RATE: 81 BPM

## 2020-08-19 DIAGNOSIS — I48.20 CHRONIC ATRIAL FIBRILLATION (H): ICD-10-CM

## 2020-08-19 DIAGNOSIS — E87.6 HYPOKALEMIA: ICD-10-CM

## 2020-08-19 DIAGNOSIS — I50.33 ACUTE ON CHRONIC HEART FAILURE WITH PRESERVED EJECTION FRACTION (HFPEF) (H): ICD-10-CM

## 2020-08-19 DIAGNOSIS — I27.20 PULMONARY HYPERTENSION (H): ICD-10-CM

## 2020-08-19 DIAGNOSIS — I50.9 ACUTE ON CHRONIC CONGESTIVE HEART FAILURE, UNSPECIFIED HEART FAILURE TYPE (H): ICD-10-CM

## 2020-08-19 DIAGNOSIS — J90 RECURRENT PLEURAL EFFUSION ON LEFT: ICD-10-CM

## 2020-08-19 DIAGNOSIS — R06.02 SHORTNESS OF BREATH: Primary | ICD-10-CM

## 2020-08-19 PROCEDURE — 99215 OFFICE O/P EST HI 40 MIN: CPT | Performed by: PHYSICIAN ASSISTANT

## 2020-08-19 RX ORDER — POTASSIUM CHLORIDE 750 MG/1
20 TABLET, EXTENDED RELEASE ORAL 2 TIMES DAILY
Qty: 120 TABLET | Refills: 5 | Status: SHIPPED | OUTPATIENT
Start: 2020-08-19 | End: 2020-08-19

## 2020-08-19 RX ORDER — POTASSIUM CHLORIDE 750 MG/1
20 TABLET, EXTENDED RELEASE ORAL 2 TIMES DAILY
Qty: 360 TABLET | Refills: 1 | Status: SHIPPED | OUTPATIENT
Start: 2020-08-19 | End: 2020-10-23

## 2020-08-19 RX ORDER — FUROSEMIDE 40 MG
TABLET ORAL
Qty: 270 TABLET | Refills: 1 | Status: SHIPPED | OUTPATIENT
Start: 2020-08-19 | End: 2020-08-24

## 2020-08-19 RX ORDER — FUROSEMIDE 40 MG
TABLET ORAL
Qty: 90 TABLET | Refills: 5 | Status: SHIPPED | OUTPATIENT
Start: 2020-08-19 | End: 2020-08-19

## 2020-08-19 ASSESSMENT — MIFFLIN-ST. JEOR: SCORE: 1425.67

## 2020-08-19 NOTE — PATIENT INSTRUCTIONS
Today, we discussed the following:   - Results: The kidney function is stable. Your weight is rising. Watch the sodium intake please :)   - Medication changes:  Increase the Lasix to 80 mg (2 tabs) in the AM, and 40 mg (1 tab) in the PM (at 2p). Increase the potassium to 40 (2 tabs) twice daily.   - Follow up: With me in two weeks + labs and heart monitor placement.   My nurse will call you to get the [employer] system set up.   Someone from pulmonary rehab should call you to set up a consult visit.     Please, remember to continue the followin.  Weigh yourself daily. Call if your weight is up > than 2 pounds in one day, or 5 pounds in one week; if you feel more short of breath or have worsening swelling in your legs or abdomen.  2.  Eat a low sodium diet (less than 2,000mg or 2g daily). If you eat less salt, you will retain less fluid.   3.  Avoid alcohol, as this can worsen heart failure.   4.  Avoid NSAIDs as able (For example, Ibuprofen / aleve / advil / naprosen / diclofenac).    Please call [facility] nurse for any questions or concerns Mon-Fri 8am-4pm:   [phone]  For concerns after hours:   [phone] option 2   Scheduling phone number:   [phone]    Thank you for visiting with us today.   Ame Mejía PA-C  ______________________________________________________________

## 2020-08-19 NOTE — PROGRESS NOTES
Cardiology Clinic Progress Note    Tc Garcia MRN# [id number]   YOB: 1940 Age: 80 year old         History of Presenting Illness:      Tc Garcia is a pleasant 80 year old patient of Dr. Bonilla () and Dr. Julien who presents today a [facility] clinic follow up visit. He has been followed closely in the [facility] clinic by Katlin Fontanez PA-C, who has asked him to come to clinic for in-person assessment today.     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 Baseline creat ~ 1.5.  # Obesity - Body mass index is 23.78 kg/m .   # Social - Lives with wife, Radha. Sedentary. High sodium diet, medication noncompliance, some concern over cognitive function    Recent admissions:  - Beginning of May 2020 with progressive dyspnea and due to recurrent (transudative) pleural effusion a left PleurX catheter was placed. He was diuresed with IV furosemide and given empiric abx therapy. Echo showed normal LVEF 55-60% with normal wall motion. The RV was moderately dilated with decreased systolic function and mild TR. He underwent a RHC during that stay that showed mild PH (PA pressure of 28 mmHg (51/15), mean RA of 4mmHg, and mildly elevated filling pressures with a wedge of 12mmHg (Vwave 16). PVR was 3.9 Wood units and Carlos Manuel CO/CI was 4.09/2.23. With vasodilator challenge, his PVR normalized to 1.22 wood units. Therefore, he was deemed to have pulmonary  "hypertension out of proportion to his left heart disease. He was started on Cialis 5mg daily but because this was not a formulated preparation, was switched to sildenafil 20 mg 3 times daily and discharged home. Interestingly, he was not discharged on any diuretics. Discharge wt was 152 lbs. His admission was complicated by atrial flutter for which he was given amiodarone but because of prolongation in QTc, this was discontinued, and he was continued on his PTA verapamil.    Following discharge he developed symptomatic hypotension and via phone was told to stop the sildenafil. He then saw Dr. Chad gusman for PH evaluation a few days later. He reported hypertension with SBP >200mmHg and weight was up 12 lbs from discharge. Torsemide 10 mg daily, Losartan 25 mg daily, and KCL 40 mEq were all started. She recommended resuming sildenafil once he was felt to be euvolemic.       - Readmitted from 5/21 to 5/29 with uncontrolled diabetes and recurrent acute HFpEF, related to medication noncompliance. He again required IV lasix and was discharged on Lasix 40mg BID in place of the torsemide. Due to renal concerns, his hydrochlorothiazide and losartan were held. Fortunately, home health was arranged to help him manage his medications at home.     - 7/8-7/10 for re-accumulating pleural effusion, at which time IR evaluated his pleurex and was able to place to suction with 550ml fluid removed. He was also diuresed with good result.   - 7/10-7/18 for weakness/fall resulting in head contusion. He was found to have an empyema and received antibiotics. His pleurex was removed and a chest tube was placed. CT drained \"nearly all the fluid\" and was removed day prior to discharge.    Tc had a follow-up phone visit with Katlin on 8/11/20. His only complaint was of debilitating back pain for which he was seeing a spine specialist. He reported a stable weight from his most recent discharge, at 155 lbs. Labs showed a creatinine of 1.5, " "BUN 16, stable electrolytes, TSH slightly elevated but T4F WNL. She kept his Lasix the same at 40 mg BID and asked him to present to clinic for evaluation, for which I am meeting him today.      Today, he tells me his symptoms are stable. He gets dyspneic with stair-climbing but otherwise does not notice it. His leg swelling is notable but stable (2+ to the knees, chronic venous stasis changes). His biggest complaint is of fatigue and ongoing back pain. His weight is up from 154 --> 161 lbs compared with his last clinic visit beginning of July. Similarly, his home weight has risen 5 lbs over the past two days after eating a large meal of pasta. His BP is mildly uncontrolled however he did not take his medications yet this morning. He checks his pressures at home and states the SBP is typically in the 120's. He denies chest pain, PND, orthopnea, edema, claudication, palpitations, near syncope or syncope. Generally gets poor sleep. No abdominal bloating, good appetite. \"Trying\" to attempt a low sodium diet, his wife helps him with this.      Assessment/Plan:  1. Acute on chronic HFpEF / cor pulmonale, with recurrent admissions (four in past four months) due to multiple comorbidities including pleural effusion, medication noncompliance, poorly controlled diabetes and anemia.               -- His most recent echo from May 2020 shows preserved EF 55-60%, with normal wall motion. RV was mild to moderately dilated with mildly decreased RV function. He had a RHC during his hospital stay in May as below. He has since had recurrent hospitalizations in the setting of intermittent volume overload but also a recurrent pleural effusion/empyema. Clinically he states he is feeling much better after treatment of the empyema.   -- Goal wt ~145 lbs, however a \"good weight\" (in terms of keeping him out of the hospital) has been to maintain him ~150-155 lbs. Currently 161 lbs, rising after eating pasta. Exam notable for mildly elevated " CVP, clear lungs and 2+ bilateral pitting edema which pt states is stable.    -- Increase Lasix from 40 BID to 80 mg qAM/40 mg qPM (starting today). Of note, he takes his diuretics at 9a and 9p. Has much nocturia and difficulty sleeping. I've advised him to move his PM dose to 2 pm.    -- Increase K-dur from 10 BID to 20 BID. Last K+ was 3.7. Creat is at baseline.   -- Counseled on sodium intake.    -- We discussed enrolling in [facility] today. He is interested. Will enroll with range 152-157 lbs.    -- Once we are back to implanting, he should be considered for a cardioMEMs.                 2. Pulmonary hypertension.              --RHC in May 2020 showed PA pressure of 28 mmHg (51/15), mean RA of 4mmHg, and mildly elevated filling pressures with a wedge of 12mmHg (Vwave 16). PVR was 3.9 Wood units and Carlos Manuel CO/CI was 4.09/2.23. With vasodilator challenge, his PVR normalized to 1.22 wood units. Therefore, he was deemed to have pulmonary hypertension out of proportion to his left heart disease. Wt at that time was 150 lbs.               --As per Dr. Bonilla's recommendations, once he is euvolemic, our plan is to potentially reintroduce sildenafil. However, thus far, he has not proven to be a good or stable candidate for this.               -- Will order pulmonary rehab.      3. Paroxysmal atrial fibrillation/flutter.              -- Sounds rate controlled on exam. Will obtain a zio monitor next visit to ensure he is adequately rate-controlled on verapamil.               --He is on anticoagulation with apixaban 2.5mg BID due to age and renal function.     4. Hypertension.              -- Reportedly controlled at home.     5. Anemia of chronic disease.   -- Most recent Hgb 8.7. Has received Fe infusions in the past with symptomatic improvement.     Follow-up:  - In clinic with me in two weeks + labs (BMP, proBNP, Hgb).          Review of Systems:     12-pt ROS is negative except for as noted in the HPI.          Physical Exam:  "    Vitals: BP (!) 142/81 (BP Location: Left arm, Cuff Size: Adult Regular)   Pulse 81   Temp 96.4  F (35.8  C)   Ht 1.753 m (5' 9\")   Wt 73 kg (161 lb)   SpO2 98%   BMI 23.78 kg/m    Wt Readings from Last 10 Encounters:   08/19/20 73 kg (161 lb)   08/11/20 70.3 kg (155 lb)   07/15/20 71.1 kg (156 lb 12 oz)   07/10/20 67.5 kg (148 lb 14.4 oz)   07/08/20 72.2 kg (159 lb 3.2 oz)   06/16/20 70.8 kg (156 lb)   06/05/20 67.5 kg (148 lb 12.8 oz)   05/29/20 69.4 kg (153 lb)   05/21/20 70.3 kg (155 lb)   05/18/20 74.4 kg (164 lb)       Constitutional:  Patient is pleasant, alert, cooperative, and in NAD.  HEENT:  NCAT. PERRLA. EOM's intact.   Neck:  CVP appears normal. No carotid bruits.   Pulmonary: Normal respiratory effort. CTAB.   Cardiac: RRR, normal S1/S2, no S3/S4, no murmur or rub.   Abdomen:  Non-tender abdomen, no hepatosplenomegaly appreciated.   Vascular: Pulses in the upper and lower extremities are 2+ and equal bilaterally.  Extremities: No edema, erythema, cyanosis or tenderness appreciated.  Skin:  No rashes or lesions appreciated.   Neurological:  No gross motor or sensory deficits.   Psych: Appropriate affect.        Data:     Labs reviewed:  Recent Labs   Lab Test 08/10/20  1027 07/08/20  0944 06/16/20  1313 05/13/20  0553 05/12/20  0541 05/11/20  0542 04/25/20  1228   LDL  --   --   --  43  --   --   --    HDL  --   --   --  60  --   --   --    NHDL  --   --   --  56  --   --   --    CHOL  --   --   --  116  --   --   --    TRIG  --   --   --  65  --   --   --    TSH 4.75*  --   --   --  3.47  --  4.39*   NTBNP  --  2,210* 2,020*  --   --   --   --    IRON  --   --   --   --   --  16*  --    FEB  --   --   --   --   --  161*  --    IRONSAT  --   --   --   --   --  10*  --    ERNESTO  --   --   --   --   --  142  --        Lab Results   Component Value Date    WBC 10.1 07/30/2020    RBC 3.28 (L) 07/30/2020    HGB 8.7 (L) 07/30/2020    HCT 29.2 (L) 07/30/2020    MCV 89 07/30/2020    MCH 26.5 07/30/2020 "    MCHC 29.8 (L) 07/30/2020    RDW 16.9 (H) 07/30/2020     (H) 07/30/2020       Lab Results   Component Value Date     08/10/2020    POTASSIUM 3.7 08/10/2020    CHLORIDE 100 08/10/2020    CO2 30 (H) 08/10/2020    ANIONGAP 11.7 08/10/2020     (H) 08/10/2020    BUN 16 08/10/2020    CR 1.52 (H) 08/10/2020    GFRESTIMATED 44 (L) 08/10/2020    GFRESTBLACK 54 (L) 08/10/2020    LLOYD 8.9 08/10/2020      Lab Results   Component Value Date    AST 13 08/10/2020    ALT 11 08/10/2020       Lab Results   Component Value Date    A1C 7.4 (H) 07/08/2020       Lab Results   Component Value Date    INR 1.18 (H) 07/17/2020    INR 1.49 (H) 05/10/2020           Problem List:     Patient Active Problem List   Diagnosis     DM type 2, Hgb A1C 8.2 on 4/25/20     Mixed hyperlipidemia     Essential hypertension     Pain in limb     Plantar fascial fibromatosis     HYPERLIPIDEMIA LDL GOAL <100     Hemothorax on left     Recurrent Left Pleural effusion -- S/P Pleurex Cath 5/12/20     ABBIE (acute kidney injury) (H)     Acute respiratory failure with hypoxia (H)     Pulmonary hypertension (H)     CRF (chronic renal failure), stage 3      Anemia of chronic disease     Atrial fibrillation/flutter -- on Eliquis and Amiodarone     DKA (diabetic ketoacidoses) (H)     Anemia in CKD (chronic kidney disease)     Dyspnea     SOB (shortness of breath)           Medications:     Current Outpatient Medications   Medication Sig Dispense Refill     acetaminophen (TYLENOL) 500 MG tablet Take 1,000 mg by mouth 3 times daily       albuterol (PROAIR HFA/PROVENTIL HFA/VENTOLIN HFA) 108 (90 Base) MCG/ACT inhaler Inhale 2 puffs into the lungs every 4 hours as needed for shortness of breath / dyspnea or wheezing Inhale 2 puffs every 4 to 6 hours as needed.       apixaban ANTICOAGULANT (ELIQUIS) 5 MG tablet Take 1/2 tablet (2.5mg) by mouth twice daily. You will have your labs checked on 5/4/20 and your PCP will direct you when it is safe to increase  your dose back to 1 tablet (5mg) twice daily.       furosemide (LASIX) 40 MG tablet Take 1 tablet (40 mg) by mouth 2 times daily 180 tablet 0     glucagon 1 MG kit 1 mg as needed for low blood sugar       insulin aspart (NOVOLOG PEN) 100 UNIT/ML pen Inject 8 units subcutaneous with breakfast, 8 units subcutaneous with lunch and 4 units with supper. 3 mL 0     insulin glargine (LANTUS PEN) 100 UNIT/ML pen Inject 14 Units Subcutaneous every morning       losartan (COZAAR) 25 MG tablet Take 25 mg by mouth daily       LOVASTATIN 20 MG PO TABS 1 TABLET DAILY AT DINNER 90 Tab 0     nystatin (MYCOSTATIN) 406944 UNIT/GM external cream Apply topically 2 times daily as needed        potassium chloride ER (KLOR-CON M) 10 MEQ CR tablet Take 1 tablet (10 mEq) by mouth 2 times daily 60 tablet 0     tiotropium (SPIRIVA) 18 MCG inhaled capsule Inhale 18 mcg into the lungs daily       verapamil ER (VERELAN) 240 MG 24 hr capsule Take 1 capsule (240 mg) by mouth At Bedtime             Past Medical History:     Past Medical History:   Diagnosis Date     Anemia      Anemia of chronic disease 5/14/2020     CKD (chronic kidney disease) stage 3, GFR 30-59 ml/min (H)      CRF (chronic renal failure), stage 3  5/14/2020     Fall 11/2019    suffered multiple left rib fractures, C3 and T2 fractures     Mixed hyperlipidemia 7/7/2004     Paroxysmal atrial fibrillation (H)      Personal history of colonic polyps 1972    gets colonoscopy every five years, due in 2006     Pleural effusion on left 11/2019    after multiple rib fractures     Pulmonary hypertension (H) 5/10/2020    Added automatically from request for surgery [id number]     Recurrent Left Pleural effusion -- S/P Pleurex Cath 5/12/20 12/30/2019     Rosacea 1989     Type II or unspecified type diabetes mellitus without mention of complication, not stated as uncontrolled 1999     Unspecified essential hypertension 1994     Past Surgical History:   Procedure Laterality Date     ANESTHESIA  CARDIOVERSION N/A 2/3/2020    Procedure: ANESTHESIA, FOR CARDIOVERSION;  Surgeon: GENERIC ANESTHESIA PROVIDER;  Location:  OR     CV RIGHT HEART CATH N/A 2020    Procedure: Right Heart Cath;  Surgeon: Senthil Silva MD;  Location:  HEART CARDIAC CATH LAB     HC REMOVE TONSILS/ADENOIDS,<11 Y/O      T & A <11y.o.     IR CHEST TUBE DRAIN TUNNELED LEFT  2020     IR CHEST TUBE PLACEMENT NON-TUNNELLED LEFT  2020     IR CHEST TUBE REMOVAL NON TUNNELED LEFT  2020     IR CHEST TUBE REMOVAL TUNNELED LEFT  2020     Family History   Problem Relation Age of Onset     Family History Negative Mother          age 71     Family History Negative Father          age 70     Diabetes Brother         alive age 77     Diabetes Brother         alive age 76     Family History Negative Brother              Family History Negative Brother              Diabetes Sister         alive age74     Family History Negative Sister          age 86     Heart Disease Sister              Family History Negative Sister              Family History Negative Sister              Diabetes Sister      Diabetes Sister      Diabetes Brother      Diabetes Brother      Social History     Socioeconomic History     Marital status:      Spouse name: Lianna     Number of children: 4     Years of education: 16     Highest education level: Not on file   Occupational History     Occupation: [employer]     Employer: [employer]    Social Needs     Financial resource strain: Not on file     Food insecurity     Worry: Not on file     Inability: Not on file     Transportation needs     Medical: Not on file     Non-medical: Not on file   Tobacco Use     Smoking status: Former Smoker     Packs/day: 0.20     Years: 40.00     Pack years: 8.00     Types: Cigarettes     Start date:      Last attempt to quit: 2008     Years since quittin.6     Smokeless  tobacco: Never Used     Tobacco comment: occasional   Substance and Sexual Activity     Alcohol use: Not Currently     Alcohol/week: 35.0 standard drinks     Drug use: Not Currently     Sexual activity: Not on file   Lifestyle     Physical activity     Days per week: Not on file     Minutes per session: Not on file     Stress: Not on file   Relationships     Social connections     Talks on phone: Not on file     Gets together: Not on file     Attends Shinto service: Not on file     Active member of club or organization: Not on file     Attends meetings of clubs or organizations: Not on file     Relationship status: Not on file     Intimate partner violence     Fear of current or ex partner: Not on file     Emotionally abused: Not on file     Physically abused: Not on file     Forced sexual activity: Not on file   Other Topics Concern     Parent/sibling w/ CABG, MI or angioplasty before 65F 55M? Not Asked   Social History Narrative     Not on file           Allergies:   No known drug allergies      Ame Mejía PA-C  [facility]  Pager: [phone]

## 2020-08-19 NOTE — LETTER
8/19/2020    Charlie Finch MD  [address]  [facility] [address]    RE: Tc Garcia       Dear Colleague,    I had the pleasure of seeing Tc Garcia in the [facility].      Cardiology Clinic Progress Note    Tc Garcia MRN# [id number]   YOB: 1940 Age: 80 year old         History of Presenting Illness:      Tc Garcia is a pleasant 80 year old patient of Dr. Bonilla (PH) and Dr. Julien who presents today a [facility] clinic follow up visit. He has been followed closely in the [facility] clinic by Katlin Fontanez PA-C, who has asked him to come to clinic for in-person assessment today.     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 Baseline creat ~ 1.5.  # Obesity - Body mass index is 23.78 kg/m .   # Social - Lives with wife, Radha. Sedentary. High sodium diet, medication noncompliance, some concern over cognitive function    Recent admissions:  - Beginning of May 2020 with progressive dyspnea and due to recurrent (transudative) pleural effusion a left PleurX catheter was placed. He was diuresed with IV furosemide and given empiric abx therapy. Echo showed normal LVEF 55-60% with normal wall motion. The RV was moderately dilated with decreased systolic function and mild TR. He underwent a RHC during that stay that showed mild PH (PA pressure of 28 mmHg (51/15), mean RA of 4mmHg, and mildly elevated  "filling pressures with a wedge of 12mmHg (Vwave 16). PVR was 3.9 Wood units and Carlos Manuel CO/CI was 4.09/2.23. With vasodilator challenge, his PVR normalized to 1.22 wood units. Therefore, he was deemed to have pulmonary hypertension out of proportion to his left heart disease. He was started on Cialis 5mg daily but because this was not a formulated preparation, was switched to sildenafil 20 mg 3 times daily and discharged home. Interestingly, he was not discharged on any diuretics. Discharge wt was 152 lbs. His admission was complicated by atrial flutter for which he was given amiodarone but because of prolongation in QTc, this was discontinued, and he was continued on his PTA verapamil.    Following discharge he developed symptomatic hypotension and via phone was told to stop the sildenafil. He then saw Dr. Chad gusman for PH evaluation a few days later. He reported hypertension with SBP >200mmHg and weight was up 12 lbs from discharge. Torsemide 10 mg daily, Losartan 25 mg daily, and KCL 40 mEq were all started. She recommended resuming sildenafil once he was felt to be euvolemic.       - Readmitted from 5/21 to 5/29 with uncontrolled diabetes and recurrent acute HFpEF, related to medication noncompliance. He again required IV lasix and was discharged on Lasix 40mg BID in place of the torsemide. Due to renal concerns, his hydrochlorothiazide and losartan were held. Fortunately, home health was arranged to help him manage his medications at home.     - 7/8-7/10 for re-accumulating pleural effusion, at which time IR evaluated his pleurex and was able to place to suction with 550ml fluid removed. He was also diuresed with good result.   - 7/10-7/18 for weakness/fall resulting in head contusion. He was found to have an empyema and received antibiotics. His pleurex was removed and a chest tube was placed. CT drained \"nearly all the fluid\" and was removed day prior to discharge.    Tc had a follow-up phone visit with " "Katlin on 8/11/20. His only complaint was of debilitating back pain for which he was seeing a spine specialist. He reported a stable weight from his most recent discharge, at 155 lbs. Labs showed a creatinine of 1.5, BUN 16, stable electrolytes, TSH slightly elevated but T4F WNL. She kept his Lasix the same at 40 mg BID and asked him to present to clinic for evaluation, for which I am meeting him today.      Today, he tells me his symptoms are stable. He gets dyspneic with stair-climbing but otherwise does not notice it. His leg swelling is notable but stable (2+ to the knees, chronic venous stasis changes). His biggest complaint is of fatigue and ongoing back pain. His weight is up from 154 --> 161 lbs compared with his last clinic visit beginning of July. Similarly, his home weight has risen 5 lbs over the past two days after eating a large meal of pasta. His BP is mildly uncontrolled however he did not take his medications yet this morning. He checks his pressures at home and states the SBP is typically in the 120's. He denies chest pain, PND, orthopnea, edema, claudication, palpitations, near syncope or syncope. Generally gets poor sleep. No abdominal bloating, good appetite. \"Trying\" to attempt a low sodium diet, his wife helps him with this.      Assessment/Plan:  1. Acute on chronic HFpEF / cor pulmonale, with recurrent admissions (four in past four months) due to multiple comorbidities including pleural effusion, medication noncompliance, poorly controlled diabetes and anemia.               -- His most recent echo from May 2020 shows preserved EF 55-60%, with normal wall motion. RV was mild to moderately dilated with mildly decreased RV function. He had a RHC during his hospital stay in May as below. He has since had recurrent hospitalizations in the setting of intermittent volume overload but also a recurrent pleural effusion/empyema. Clinically he states he is feeling much better after treatment of the " "empyema.   -- Goal wt ~145 lbs, however a \"good weight\" (in terms of keeping him out of the hospital) has been to maintain him ~150-155 lbs. Currently 161 lbs, rising after eating pasta. Exam notable for mildly elevated CVP, clear lungs and 2+ bilateral pitting edema which pt states is stable.    -- Increase Lasix from 40 BID to 80 mg qAM/40 mg qPM (starting today). Of note, he takes his diuretics at 9a and 9p. Has much nocturia and difficulty sleeping. I've advised him to move his PM dose to 2 pm.    -- Increase K-dur from 10 BID to 20 BID. Last K+ was 3.7. Creat is at baseline.   -- Counseled on sodium intake.    -- We discussed enrolling in MHT today. He is interested. Will enroll with range 152-157 lbs.    -- Once we are back to implanting, he should be considered for a cardioMEMs.                 2. Pulmonary hypertension.              --RHC in May 2020 showed PA pressure of 28 mmHg (51/15), mean RA of 4mmHg, and mildly elevated filling pressures with a wedge of 12mmHg (Vwave 16). PVR was 3.9 Wood units and Carlos Manuel CO/CI was 4.09/2.23. With vasodilator challenge, his PVR normalized to 1.22 wood units. Therefore, he was deemed to have pulmonary hypertension out of proportion to his left heart disease. Wt at that time was 150 lbs.               --As per Dr. Bonilla's recommendations, once he is euvolemic, our plan is to potentially reintroduce sildenafil. However, thus far, he has not proven to be a good or stable candidate for this.               -- Will order pulmonary rehab.      3. Paroxysmal atrial fibrillation/flutter.              -- Sounds rate controlled on exam. Will obtain a zio monitor next visit to ensure he is adequately rate-controlled on verapamil.               --He is on anticoagulation with apixaban 2.5mg BID due to age and renal function.     4. Hypertension.              -- Reportedly controlled at home.     5. Anemia of chronic disease.   -- Most recent Hgb 8.7. Has received Fe infusions in the " "past with symptomatic improvement.     Follow-up:  - In clinic with me in two weeks + labs (BMP, proBNP, Hgb).          Review of Systems:     12-pt ROS is negative except for as noted in the HPI.          Physical Exam:     Vitals: BP (!) 142/81 (BP Location: Left arm, Cuff Size: Adult Regular)   Pulse 81   Temp 96.4  F (35.8  C)   Ht 1.753 m (5' 9\")   Wt 73 kg (161 lb)   SpO2 98%   BMI 23.78 kg/m    Wt Readings from Last 10 Encounters:   08/19/20 73 kg (161 lb)   08/11/20 70.3 kg (155 lb)   07/15/20 71.1 kg (156 lb 12 oz)   07/10/20 67.5 kg (148 lb 14.4 oz)   07/08/20 72.2 kg (159 lb 3.2 oz)   06/16/20 70.8 kg (156 lb)   06/05/20 67.5 kg (148 lb 12.8 oz)   05/29/20 69.4 kg (153 lb)   05/21/20 70.3 kg (155 lb)   05/18/20 74.4 kg (164 lb)       Constitutional:  Patient is pleasant, alert, cooperative, and in NAD.  HEENT:  NCAT. PERRLA. EOM's intact.   Neck:  CVP appears normal. No carotid bruits.   Pulmonary: Normal respiratory effort. CTAB.   Cardiac: RRR, normal S1/S2, no S3/S4, no murmur or rub.   Abdomen:  Non-tender abdomen, no hepatosplenomegaly appreciated.   Vascular: Pulses in the upper and lower extremities are 2+ and equal bilaterally.  Extremities: No edema, erythema, cyanosis or tenderness appreciated.  Skin:  No rashes or lesions appreciated.   Neurological:  No gross motor or sensory deficits.   Psych: Appropriate affect.        Data:     Labs reviewed:  Recent Labs   Lab Test 08/10/20  1027 07/08/20  0944 06/16/20  1313 05/13/20  0553 05/12/20  0541 05/11/20  0542 04/25/20  1228   LDL  --   --   --  43  --   --   --    HDL  --   --   --  60  --   --   --    NHDL  --   --   --  56  --   --   --    CHOL  --   --   --  116  --   --   --    TRIG  --   --   --  65  --   --   --    TSH 4.75*  --   --   --  3.47  --  4.39*   NTBNP  --  2,210* 2,020*  --   --   --   --    IRON  --   --   --   --   --  16*  --    FEB  --   --   --   --   --  161*  --    IRONSAT  --   --   --   --   --  10*  --    ERNESTO  " --   --   --   --   --  142  --        Lab Results   Component Value Date    WBC 10.1 07/30/2020    RBC 3.28 (L) 07/30/2020    HGB 8.7 (L) 07/30/2020    HCT 29.2 (L) 07/30/2020    MCV 89 07/30/2020    MCH 26.5 07/30/2020    MCHC 29.8 (L) 07/30/2020    RDW 16.9 (H) 07/30/2020     (H) 07/30/2020       Lab Results   Component Value Date     08/10/2020    POTASSIUM 3.7 08/10/2020    CHLORIDE 100 08/10/2020    CO2 30 (H) 08/10/2020    ANIONGAP 11.7 08/10/2020     (H) 08/10/2020    BUN 16 08/10/2020    CR 1.52 (H) 08/10/2020    GFRESTIMATED 44 (L) 08/10/2020    GFRESTBLACK 54 (L) 08/10/2020    LLOYD 8.9 08/10/2020      Lab Results   Component Value Date    AST 13 08/10/2020    ALT 11 08/10/2020       Lab Results   Component Value Date    A1C 7.4 (H) 07/08/2020       Lab Results   Component Value Date    INR 1.18 (H) 07/17/2020    INR 1.49 (H) 05/10/2020           Problem List:     Patient Active Problem List   Diagnosis     DM type 2, Hgb A1C 8.2 on 4/25/20     Mixed hyperlipidemia     Essential hypertension     Pain in limb     Plantar fascial fibromatosis     HYPERLIPIDEMIA LDL GOAL <100     Hemothorax on left     Recurrent Left Pleural effusion -- S/P Pleurex Cath 5/12/20     ABBIE (acute kidney injury) (H)     Acute respiratory failure with hypoxia (H)     Pulmonary hypertension (H)     CRF (chronic renal failure), stage 3      Anemia of chronic disease     Atrial fibrillation/flutter -- on Eliquis and Amiodarone     DKA (diabetic ketoacidoses) (H)     Anemia in CKD (chronic kidney disease)     Dyspnea     SOB (shortness of breath)           Medications:     Current Outpatient Medications   Medication Sig Dispense Refill     acetaminophen (TYLENOL) 500 MG tablet Take 1,000 mg by mouth 3 times daily       albuterol (PROAIR HFA/PROVENTIL HFA/VENTOLIN HFA) 108 (90 Base) MCG/ACT inhaler Inhale 2 puffs into the lungs every 4 hours as needed for shortness of breath / dyspnea or wheezing Inhale 2 puffs every  4 to 6 hours as needed.       apixaban ANTICOAGULANT (ELIQUIS) 5 MG tablet Take 1/2 tablet (2.5mg) by mouth twice daily. You will have your labs checked on 5/4/20 and your PCP will direct you when it is safe to increase your dose back to 1 tablet (5mg) twice daily.       furosemide (LASIX) 40 MG tablet Take 1 tablet (40 mg) by mouth 2 times daily 180 tablet 0     glucagon 1 MG kit 1 mg as needed for low blood sugar       insulin aspart (NOVOLOG PEN) 100 UNIT/ML pen Inject 8 units subcutaneous with breakfast, 8 units subcutaneous with lunch and 4 units with supper. 3 mL 0     insulin glargine (LANTUS PEN) 100 UNIT/ML pen Inject 14 Units Subcutaneous every morning       losartan (COZAAR) 25 MG tablet Take 25 mg by mouth daily       LOVASTATIN 20 MG PO TABS 1 TABLET DAILY AT DINNER 90 Tab 0     nystatin (MYCOSTATIN) 070808 UNIT/GM external cream Apply topically 2 times daily as needed        potassium chloride ER (KLOR-CON M) 10 MEQ CR tablet Take 1 tablet (10 mEq) by mouth 2 times daily 60 tablet 0     tiotropium (SPIRIVA) 18 MCG inhaled capsule Inhale 18 mcg into the lungs daily       verapamil ER (VERELAN) 240 MG 24 hr capsule Take 1 capsule (240 mg) by mouth At Bedtime             Past Medical History:     Past Medical History:   Diagnosis Date     Anemia      Anemia of chronic disease 5/14/2020     CKD (chronic kidney disease) stage 3, GFR 30-59 ml/min (H)      CRF (chronic renal failure), stage 3  5/14/2020     Fall 11/2019    suffered multiple left rib fractures, C3 and T2 fractures     Mixed hyperlipidemia 7/7/2004     Paroxysmal atrial fibrillation (H)      Personal history of colonic polyps 1972    gets colonoscopy every five years, due in 2006     Pleural effusion on left 11/2019    after multiple rib fractures     Pulmonary hypertension (H) 5/10/2020    Added automatically from request for surgery [id number]     Recurrent Left Pleural effusion -- S/P Pleurex Cath 5/12/20 12/30/2019     Rosacea 1989     Type II  or unspecified type diabetes mellitus without mention of complication, not stated as uncontrolled      Unspecified essential hypertension      Past Surgical History:   Procedure Laterality Date     ANESTHESIA CARDIOVERSION N/A 2/3/2020    Procedure: ANESTHESIA, FOR CARDIOVERSION;  Surgeon: GENERIC ANESTHESIA PROVIDER;  Location:  OR     CV RIGHT HEART CATH N/A 2020    Procedure: Right Heart Cath;  Surgeon: Senthil Silva MD;  Location:  HEART CARDIAC CATH LAB     HC REMOVE TONSILS/ADENOIDS,<11 Y/O      T & A <11y.o.     IR CHEST TUBE DRAIN TUNNELED LEFT  2020     IR CHEST TUBE PLACEMENT NON-TUNNELLED LEFT  2020     IR CHEST TUBE REMOVAL NON TUNNELED LEFT  2020     IR CHEST TUBE REMOVAL TUNNELED LEFT  2020     Family History   Problem Relation Age of Onset     Family History Negative Mother          age 71     Family History Negative Father          age 70     Diabetes Brother         alive age 77     Diabetes Brother         alive age 76     Family History Negative Brother              Family History Negative Brother              Diabetes Sister         alive age76     Family History Negative Sister          age 86     Heart Disease Sister              Family History Negative Sister              Family History Negative Sister              Diabetes Sister      Diabetes Sister      Diabetes Brother      Diabetes Brother      Social History     Socioeconomic History     Marital status:      Spouse name: Lianna     Number of children: 4     Years of education: 16     Highest education level: Not on file   Occupational History     Occupation: NICORIER     Employer: [employer]    Social Needs     Financial resource strain: Not on file     Food insecurity     Worry: Not on file     Inability: Not on file     Transportation needs     Medical: Not on file     Non-medical: Not on file   Tobacco  Use     Smoking status: Former Smoker     Packs/day: 0.20     Years: 40.00     Pack years: 8.00     Types: Cigarettes     Start date:      Last attempt to quit: 2008     Years since quittin.6     Smokeless tobacco: Never Used     Tobacco comment: occasional   Substance and Sexual Activity     Alcohol use: Not Currently     Alcohol/week: 35.0 standard drinks     Drug use: Not Currently     Sexual activity: Not on file   Lifestyle     Physical activity     Days per week: Not on file     Minutes per session: Not on file     Stress: Not on file   Relationships     Social connections     Talks on phone: Not on file     Gets together: Not on file     Attends Scientologist service: Not on file     Active member of club or organization: Not on file     Attends meetings of clubs or organizations: Not on file     Relationship status: Not on file     Intimate partner violence     Fear of current or ex partner: Not on file     Emotionally abused: Not on file     Physically abused: Not on file     Forced sexual activity: Not on file   Other Topics Concern     Parent/sibling w/ CABG, MI or angioplasty before 65F 55M? Not Asked   Social History Narrative     Not on file           Allergies:   No known drug allergies      Ame Mejía PA-C  [facility]  Pager: [phone]      Thank you for allowing me to participate in the care of your patient.      Sincerely,     Ame Mejía PA-C     [facility]    cc:   Charlie Finch MD  [id number] [place] [address], [place] [address]

## 2020-08-19 NOTE — LETTER
8/19/2020    Charlie Finch MD  [address]  [facility] [address]    RE: Tc Garcia       Dear Colleague,    I had the pleasure of seeing Tc Garcia in the [facility].    Cardiology Clinic Progress Note    Tc Garcia MRN# [id number]   YOB: 1940 Age: 80 year old         History of Presenting Illness:      Tc Garcia is a pleasant 80 year old patient of Dr. Bonilla (PH) and Dr. Julien who presents today a CORE clinic follow up visit. He has been followed closely in the CORE clinic by Katlin Fontanez PA-C, who has asked him to come to clinic for in-person assessment today.     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 Baseline creat ~ 1.5.  # Obesity - Body mass index is 23.78 kg/m .   # Social - Lives with wife, Radha. Sedentary. High sodium diet, medication noncompliance, some concern over cognitive function    Recent admissions:  - Beginning of May 2020 with progressive dyspnea and due to recurrent (transudative) pleural effusion a left PleurX catheter was placed. He was diuresed with IV furosemide and given empiric abx therapy. Echo showed normal LVEF 55-60% with normal wall motion. The RV was moderately dilated with decreased systolic function and mild TR. He underwent a RHC during that stay that showed mild PH (PA pressure of 28 mmHg (51/15), mean RA of 4mmHg, and mildly elevated  "filling pressures with a wedge of 12mmHg (Vwave 16). PVR was 3.9 Wood units and Carlos Manuel CO/CI was 4.09/2.23. With vasodilator challenge, his PVR normalized to 1.22 wood units. Therefore, he was deemed to have pulmonary hypertension out of proportion to his left heart disease. He was started on Cialis 5mg daily but because this was not a formulated preparation, was switched to sildenafil 20 mg 3 times daily and discharged home. Interestingly, he was not discharged on any diuretics. Discharge wt was 152 lbs. His admission was complicated by atrial flutter for which he was given amiodarone but because of prolongation in QTc, this was discontinued, and he was continued on his PTA verapamil.    Following discharge he developed symptomatic hypotension and via phone was told to stop the sildenafil. He then saw Dr. Chad gusman for PH evaluation a few days later. He reported hypertension with SBP >200mmHg and weight was up 12 lbs from discharge. Torsemide 10 mg daily, Losartan 25 mg daily, and KCL 40 mEq were all started. She recommended resuming sildenafil once he was felt to be euvolemic.       - Readmitted from 5/21 to 5/29 with uncontrolled diabetes and recurrent acute HFpEF, related to medication noncompliance. He again required IV lasix and was discharged on Lasix 40mg BID in place of the torsemide. Due to renal concerns, his hydrochlorothiazide and losartan were held. Fortunately, home health was arranged to help him manage his medications at home.     - 7/8-7/10 for re-accumulating pleural effusion, at which time IR evaluated his pleurex and was able to place to suction with 550ml fluid removed. He was also diuresed with good result.   - 7/10-7/18 for weakness/fall resulting in head contusion. He was found to have an empyema and received antibiotics. His pleurex was removed and a chest tube was placed. CT drained \"nearly all the fluid\" and was removed day prior to discharge.    Tc had a follow-up phone visit with " "Katlin on 8/11/20. His only complaint was of debilitating back pain for which he was seeing a spine specialist. He reported a stable weight from his most recent discharge, at 155 lbs. Labs showed a creatinine of 1.5, BUN 16, stable electrolytes, TSH slightly elevated but T4F WNL. She kept his Lasix the same at 40 mg BID and asked him to present to clinic for evaluation, for which I am meeting him today.      Today, he tells me his symptoms are stable. He gets dyspneic with stair-climbing but otherwise does not notice it. His leg swelling is notable but stable (2+ to the knees, chronic venous stasis changes). His biggest complaint is of fatigue and ongoing back pain. His weight is up from 154 --> 161 lbs compared with his last clinic visit beginning of July. Similarly, his home weight has risen 5 lbs over the past two days after eating a large meal of pasta. His BP is mildly uncontrolled however he did not take his medications yet this morning. He checks his pressures at home and states the SBP is typically in the 120's. He denies chest pain, PND, orthopnea, edema, claudication, palpitations, near syncope or syncope. Generally gets poor sleep. No abdominal bloating, good appetite. \"Trying\" to attempt a low sodium diet, his wife helps him with this.      Assessment/Plan:  1. Acute on chronic HFpEF / cor pulmonale, with recurrent admissions (four in past four months) due to multiple comorbidities including pleural effusion, medication noncompliance, poorly controlled diabetes and anemia.               -- His most recent echo from May 2020 shows preserved EF 55-60%, with normal wall motion. RV was mild to moderately dilated with mildly decreased RV function. He had a RHC during his hospital stay in May as below. He has since had recurrent hospitalizations in the setting of intermittent volume overload but also a recurrent pleural effusion/empyema. Clinically he states he is feeling much better after treatment of the " "empyema.   -- Goal wt ~145 lbs, however a \"good weight\" (in terms of keeping him out of the hospital) has been to maintain him ~150-155 lbs. Currently 161 lbs, rising after eating pasta. Exam notable for mildly elevated CVP, clear lungs and 2+ bilateral pitting edema which pt states is stable.    -- Increase Lasix from 40 BID to 80 mg qAM/40 mg qPM (starting today). Of note, he takes his diuretics at 9a and 9p. Has much nocturia and difficulty sleeping. I've advised him to move his PM dose to 2 pm.    -- Increase K-dur from 10 BID to 20 BID. Last K+ was 3.7. Creat is at baseline.   -- Counseled on sodium intake.    -- We discussed enrolling in MHT today. He is interested. Will enroll with range 152-157 lbs.    -- Once we are back to implanting, he should be considered for a cardioMEMs.                 2. Pulmonary hypertension.              --RHC in May 2020 showed PA pressure of 28 mmHg (51/15), mean RA of 4mmHg, and mildly elevated filling pressures with a wedge of 12mmHg (Vwave 16). PVR was 3.9 Wood units and Carlos Manuel CO/CI was 4.09/2.23. With vasodilator challenge, his PVR normalized to 1.22 wood units. Therefore, he was deemed to have pulmonary hypertension out of proportion to his left heart disease. Wt at that time was 150 lbs.               --As per Dr. Bonilla's recommendations, once he is euvolemic, our plan is to potentially reintroduce sildenafil. However, thus far, he has not proven to be a good or stable candidate for this.               -- Will order pulmonary rehab.      3. Paroxysmal atrial fibrillation/flutter.              -- Sounds rate controlled on exam. Will obtain a zio monitor next visit to ensure he is adequately rate-controlled on verapamil.               --He is on anticoagulation with apixaban 2.5mg BID due to age and renal function.     4. Hypertension.              -- Reportedly controlled at home.     5. Anemia of chronic disease.   -- Most recent Hgb 8.7. Has received Fe infusions in the " "past with symptomatic improvement.     Follow-up:  - In clinic with me in two weeks + labs (BMP, proBNP, Hgb).          Review of Systems:     12-pt ROS is negative except for as noted in the HPI.          Physical Exam:     Vitals: BP (!) 142/81 (BP Location: Left arm, Cuff Size: Adult Regular)   Pulse 81   Temp 96.4  F (35.8  C)   Ht 1.753 m (5' 9\")   Wt 73 kg (161 lb)   SpO2 98%   BMI 23.78 kg/m    Wt Readings from Last 10 Encounters:   08/19/20 73 kg (161 lb)   08/11/20 70.3 kg (155 lb)   07/15/20 71.1 kg (156 lb 12 oz)   07/10/20 67.5 kg (148 lb 14.4 oz)   07/08/20 72.2 kg (159 lb 3.2 oz)   06/16/20 70.8 kg (156 lb)   06/05/20 67.5 kg (148 lb 12.8 oz)   05/29/20 69.4 kg (153 lb)   05/21/20 70.3 kg (155 lb)   05/18/20 74.4 kg (164 lb)       Constitutional:  Patient is pleasant, alert, cooperative, and in NAD.  HEENT:  NCAT. PERRLA. EOM's intact.   Neck:  CVP appears normal. No carotid bruits.   Pulmonary: Normal respiratory effort. CTAB.   Cardiac: RRR, normal S1/S2, no S3/S4, no murmur or rub.   Abdomen:  Non-tender abdomen, no hepatosplenomegaly appreciated.   Vascular: Pulses in the upper and lower extremities are 2+ and equal bilaterally.  Extremities: No edema, erythema, cyanosis or tenderness appreciated.  Skin:  No rashes or lesions appreciated.   Neurological:  No gross motor or sensory deficits.   Psych: Appropriate affect.        Data:     Labs reviewed:  Recent Labs   Lab Test 08/10/20  1027 07/08/20  0944 06/16/20  1313 05/13/20  0553 05/12/20  0541 05/11/20  0542 04/25/20  1228   LDL  --   --   --  43  --   --   --    HDL  --   --   --  60  --   --   --    NHDL  --   --   --  56  --   --   --    CHOL  --   --   --  116  --   --   --    TRIG  --   --   --  65  --   --   --    TSH 4.75*  --   --   --  3.47  --  4.39*   NTBNP  --  2,210* 2,020*  --   --   --   --    IRON  --   --   --   --   --  16*  --    FEB  --   --   --   --   --  161*  --    IRONSAT  --   --   --   --   --  10*  --    ERNESTO  " --   --   --   --   --  142  --        Lab Results   Component Value Date    WBC 10.1 07/30/2020    RBC 3.28 (L) 07/30/2020    HGB 8.7 (L) 07/30/2020    HCT 29.2 (L) 07/30/2020    MCV 89 07/30/2020    MCH 26.5 07/30/2020    MCHC 29.8 (L) 07/30/2020    RDW 16.9 (H) 07/30/2020     (H) 07/30/2020       Lab Results   Component Value Date     08/10/2020    POTASSIUM 3.7 08/10/2020    CHLORIDE 100 08/10/2020    CO2 30 (H) 08/10/2020    ANIONGAP 11.7 08/10/2020     (H) 08/10/2020    BUN 16 08/10/2020    CR 1.52 (H) 08/10/2020    GFRESTIMATED 44 (L) 08/10/2020    GFRESTBLACK 54 (L) 08/10/2020    LLOYD 8.9 08/10/2020      Lab Results   Component Value Date    AST 13 08/10/2020    ALT 11 08/10/2020       Lab Results   Component Value Date    A1C 7.4 (H) 07/08/2020       Lab Results   Component Value Date    INR 1.18 (H) 07/17/2020    INR 1.49 (H) 05/10/2020           Problem List:     Patient Active Problem List   Diagnosis     DM type 2, Hgb A1C 8.2 on 4/25/20     Mixed hyperlipidemia     Essential hypertension     Pain in limb     Plantar fascial fibromatosis     HYPERLIPIDEMIA LDL GOAL <100     Hemothorax on left     Recurrent Left Pleural effusion -- S/P Pleurex Cath 5/12/20     ABBIE (acute kidney injury) (H)     Acute respiratory failure with hypoxia (H)     Pulmonary hypertension (H)     CRF (chronic renal failure), stage 3      Anemia of chronic disease     Atrial fibrillation/flutter -- on Eliquis and Amiodarone     DKA (diabetic ketoacidoses) (H)     Anemia in CKD (chronic kidney disease)     Dyspnea     SOB (shortness of breath)           Medications:     Current Outpatient Medications   Medication Sig Dispense Refill     acetaminophen (TYLENOL) 500 MG tablet Take 1,000 mg by mouth 3 times daily       albuterol (PROAIR HFA/PROVENTIL HFA/VENTOLIN HFA) 108 (90 Base) MCG/ACT inhaler Inhale 2 puffs into the lungs every 4 hours as needed for shortness of breath / dyspnea or wheezing Inhale 2 puffs every  4 to 6 hours as needed.       apixaban ANTICOAGULANT (ELIQUIS) 5 MG tablet Take 1/2 tablet (2.5mg) by mouth twice daily. You will have your labs checked on 5/4/20 and your PCP will direct you when it is safe to increase your dose back to 1 tablet (5mg) twice daily.       furosemide (LASIX) 40 MG tablet Take 1 tablet (40 mg) by mouth 2 times daily 180 tablet 0     glucagon 1 MG kit 1 mg as needed for low blood sugar       insulin aspart (NOVOLOG PEN) 100 UNIT/ML pen Inject 8 units subcutaneous with breakfast, 8 units subcutaneous with lunch and 4 units with supper. 3 mL 0     insulin glargine (LANTUS PEN) 100 UNIT/ML pen Inject 14 Units Subcutaneous every morning       losartan (COZAAR) 25 MG tablet Take 25 mg by mouth daily       LOVASTATIN 20 MG PO TABS 1 TABLET DAILY AT DINNER 90 Tab 0     nystatin (MYCOSTATIN) 829248 UNIT/GM external cream Apply topically 2 times daily as needed        potassium chloride ER (KLOR-CON M) 10 MEQ CR tablet Take 1 tablet (10 mEq) by mouth 2 times daily 60 tablet 0     tiotropium (SPIRIVA) 18 MCG inhaled capsule Inhale 18 mcg into the lungs daily       verapamil ER (VERELAN) 240 MG 24 hr capsule Take 1 capsule (240 mg) by mouth At Bedtime             Past Medical History:     Past Medical History:   Diagnosis Date     Anemia      Anemia of chronic disease 5/14/2020     CKD (chronic kidney disease) stage 3, GFR 30-59 ml/min (H)      CRF (chronic renal failure), stage 3  5/14/2020     Fall 11/2019    suffered multiple left rib fractures, C3 and T2 fractures     Mixed hyperlipidemia 7/7/2004     Paroxysmal atrial fibrillation (H)      Personal history of colonic polyps 1972    gets colonoscopy every five years, due in 2006     Pleural effusion on left 11/2019    after multiple rib fractures     Pulmonary hypertension (H) 5/10/2020    Added automatically from request for surgery [id number]     Recurrent Left Pleural effusion -- S/P Pleurex Cath 5/12/20 12/30/2019     Rosacea 1989     Type II  or unspecified type diabetes mellitus without mention of complication, not stated as uncontrolled      Unspecified essential hypertension      Past Surgical History:   Procedure Laterality Date     ANESTHESIA CARDIOVERSION N/A 2/3/2020    Procedure: ANESTHESIA, FOR CARDIOVERSION;  Surgeon: GENERIC ANESTHESIA PROVIDER;  Location:  OR     CV RIGHT HEART CATH N/A 2020    Procedure: Right Heart Cath;  Surgeon: Senthil Silva MD;  Location:  HEART CARDIAC CATH LAB     HC REMOVE TONSILS/ADENOIDS,<13 Y/O      T & A <11y.o.     IR CHEST TUBE DRAIN TUNNELED LEFT  2020     IR CHEST TUBE PLACEMENT NON-TUNNELLED LEFT  2020     IR CHEST TUBE REMOVAL NON TUNNELED LEFT  2020     IR CHEST TUBE REMOVAL TUNNELED LEFT  2020     Family History   Problem Relation Age of Onset     Family History Negative Mother          age 71     Family History Negative Father          age 70     Diabetes Brother         alive age 77     Diabetes Brother         alive age 76     Family History Negative Brother              Family History Negative Brother              Diabetes Sister         alive age74     Family History Negative Sister          age 86     Heart Disease Sister              Family History Negative Sister              Family History Negative Sister              Diabetes Sister      Diabetes Sister      Diabetes Brother      Diabetes Brother      Social History     Socioeconomic History     Marital status:      Spouse name: Lianna     Number of children: 4     Years of education: 16     Highest education level: Not on file   Occupational History     Occupation: NICORIER     Employer: [employer]    Social Needs     Financial resource strain: Not on file     Food insecurity     Worry: Not on file     Inability: Not on file     Transportation needs     Medical: Not on file     Non-medical: Not on file   Tobacco  Use     Smoking status: Former Smoker     Packs/day: 0.20     Years: 40.00     Pack years: 8.00     Types: Cigarettes     Start date:      Last attempt to quit: 2008     Years since quittin.6     Smokeless tobacco: Never Used     Tobacco comment: occasional   Substance and Sexual Activity     Alcohol use: Not Currently     Alcohol/week: 35.0 standard drinks     Drug use: Not Currently     Sexual activity: Not on file   Lifestyle     Physical activity     Days per week: Not on file     Minutes per session: Not on file     Stress: Not on file   Relationships     Social connections     Talks on phone: Not on file     Gets together: Not on file     Attends Gnosticism service: Not on file     Active member of club or organization: Not on file     Attends meetings of clubs or organizations: Not on file     Relationship status: Not on file     Intimate partner violence     Fear of current or ex partner: Not on file     Emotionally abused: Not on file     Physically abused: Not on file     Forced sexual activity: Not on file   Other Topics Concern     Parent/sibling w/ CABG, MI or angioplasty before 65F 55M? Not Asked   Social History Narrative     Not on file           Allergies:   No known drug allergies      Ame Mejía PA-C  [facility]  Pager: [phone]      Thank you for allowing me to participate in the care of your patient.    Sincerely,     Ame Mejía PA-C     [facility]

## 2020-08-20 ENCOUNTER — CARE COORDINATION (OUTPATIENT)
Dept: CARDIOLOGY | Facility: CLINIC | Age: 80
End: 2020-08-20

## 2020-08-20 NOTE — PROGRESS NOTES
[facility] - CORE Clinic    Called patient to enroll him into MHT. Explained purpose of program and how to get started. He had not weighed himself yet this am and was not amenable to getting out of bed to do so during our conversation. He also stated that he did not have access to pen/paper, so I will send a [employer] message w/contact information to MHT. He stated he will start tomorrow am - will send reminder to board to watch for transmission.     As he had appt yesterday w/Ame Mejía and med changes made I reviewed these changes to insure understanding. He confirmed that his lasix increased to 80mg am/40mg pm and his KDur increased to 20meq bid - however he had not yet taken his am meds today.   Plan:   1.  Send [employer] message w/MHT call#   2.  Reminder to CORE board to watch for 1st transmission tomorrow(fri 8/21)   3.  Enter orders for MHT.   Argelia Bacon RN on 8/20/2020 at 9:55 AM

## 2020-08-21 ENCOUNTER — CARE COORDINATION (OUTPATIENT)
Dept: CARDIOLOGY | Facility: CLINIC | Age: 80
End: 2020-08-21

## 2020-08-21 DIAGNOSIS — J90 RECURRENT PLEURAL EFFUSION ON LEFT: ICD-10-CM

## 2020-08-21 NOTE — PROGRESS NOTES
"[facility] - CORE Clinic    Incoming call from patient stating, \"I'm supposed to be starting to call in my weights every morning but I don't have the phone number or instructions about what I'm to do.\"   I reminded him of his request to send this information in a [employer] message, which I did, but which has yet to be read. He responded, \"they wont give me a log in.\"  I provided him the contact info and asked that he call in upon completion of our conversation. Will monitor.  Argelia Bacon RN on 8/21/2020 at 11:35 AM    "

## 2020-08-24 RX ORDER — FUROSEMIDE 40 MG
40 TABLET ORAL 2 TIMES DAILY
Qty: 270 TABLET | Refills: 1 | Status: SHIPPED | OUTPATIENT
Start: 2020-08-24 | End: 2020-09-09

## 2020-08-24 NOTE — PROGRESS NOTES
Ok. Can change his goal to 145-150 lbs and have him reduce the Lasix back to 40 mg BID tomorrow. Thanks!

## 2020-08-24 NOTE — PROGRESS NOTES
[facility]-CORE Clinic  My Health Tracker HF Alert    Situation/Background:    MHT weekend report.     Today's home wt: 147# Goal wt range: 152# - 157#  Recent wts:    Heart Failure sx: None reported  Current diuretic regimen:   Lasix increased 8/19 from 40 mg BID to 80 mg QAM, 40 mg QPM   KCL 20 mEq BID  Future Appointments   Date Time Provider Department Center   8/27/2020  1:00 PM 3[facility] [place] [facility]   9/2/2020 10:10 [facility]   9/2/2020 11:00 AM [facility] CVIMG       Assessment/Recommendations:    Pt enrolled in MHT 8/21 with above goal range. Per Ame Mejía PA-C's 8/19 clinic note, goal wt approx 145#. Diuretic increased at visit d/t wt of 161# r/t diet high in pasta, elevated CVP, 2+ renu pitting edema. Will route to Ame to review parameters.     Eneida Domingo, BSN, RN, PHN, HNB-BC   8/24/2020 at 12:15 PM

## 2020-08-24 NOTE — PROGRESS NOTES
[facility] Heart-CORE Clinic    Parameters adjusted and med list updated. Called pt to review recs, no answer, left detailed VM with instructions and requested callback to confirm.     BAYRON VanN, RN, PHN, HNB-BC   8/24/2020 at 3:30 PM     Update 8/25: MHT with stable wt of 147# today, no sx. Have not heard back from pt about Lasix decrease. Called pt to discuss. He did not listen to his VM. He will decrease Lasix to 1 pill (40 mg) BID. He said that he was feeling weak but now that things are evening out he feels much better, and he can see his ankles again. Told him that if he has any concerns he can always call us, and we will cont to monitor closely on MHT. Pt agrees.      8/25/2020 at 9:33 AM

## 2020-08-27 ENCOUNTER — HOSPITAL ENCOUNTER (OUTPATIENT)
Dept: CARDIAC REHAB | Facility: CLINIC | Age: 80
End: 2020-08-27
Attending: PHYSICIAN ASSISTANT
Payer: COMMERCIAL

## 2020-08-27 ENCOUNTER — CARE COORDINATION (OUTPATIENT)
Dept: CARDIOLOGY | Facility: CLINIC | Age: 80
End: 2020-08-27

## 2020-08-27 DIAGNOSIS — I50.33 ACUTE ON CHRONIC HEART FAILURE WITH PRESERVED EJECTION FRACTION (HFPEF) (H): ICD-10-CM

## 2020-08-27 PROCEDURE — G0238 OTH RESP PROC, INDIV: HCPCS

## 2020-08-27 PROCEDURE — 40000244 ZZH STATISTIC VISIT PULM REHAB

## 2020-08-27 NOTE — PROGRESS NOTES
Have him take 80 mg this afternoon instead of 40 mg, then return to original dosing tomorrow. Thanks!

## 2020-08-27 NOTE — PROGRESS NOTES
[facility] - CORE Clinic Telemanagement  My Health Tracker HF Alert     Weight today: 149#   Weight goal: 145-150#  MyHealth Tracker CHF Flowsheet My weight today is:   8/21/2020 153   8/22/2020 149   8/23/2020 149   8/24/2020 147   8/25/2020 147   8/26/2020 149   8/27/2020 149     Heart Failure sx: SOB    Daily diuretic plan:   Lasix 40mg BID  KCL 20meq BID    Notes: Wt within goal, but reporting increased SOB today. Lasix was decreased from 80mg in am and 40mg in pm on 8/25 per FORTUNATO Ortiz. Pt has visit next week. Called pt for update, he reports he noticed feeling a little SOB first thing this am when he initially got up, but states he is much better now. He is not SOB at all, he is up and around without any problems. Asked pt to call back if SOB returns. Will send update to FORTUNATO Ortiz.      Future Appointments   Date Time Provider Department Center   8/27/2020  1:00 PM [facility]   9/2/2020 10:10 AM Ame Mejía PA-C [facility] PSA CLIN   9/2/2020 11:00 AM [facility] CVIMG       BAYRON ChavarriaN, RN, CHFN  08/27/20 at 10:16 AM

## 2020-08-27 NOTE — PROGRESS NOTES
Called pt, reviewed instructions to take an extra Lasix x1 this afternoon only. Pt usually takes afternoon dose at 2pm. He will take 80mg or two tablets this afternoon, and go back to 40mg or 1 tablet BID tomorrow.     BAYRON ChavarriaN, RN, CHFN  08/27/20 at 11:45 AM

## 2020-08-28 NOTE — PROGRESS NOTES
[facility] Heart-CORE Clinic    MHT update: wt down 1# to 148# today following extra Lasix yesterday, no sx reported. Will cont to monitor.     BAYRON VanN, RN, PHN, HNB-BC   8/28/2020 at 8:29 AM

## 2020-09-02 ENCOUNTER — HOSPITAL ENCOUNTER (OUTPATIENT)
Dept: LAB | Facility: CLINIC | Age: 80
End: 2020-09-02
Attending: PHYSICIAN ASSISTANT
Payer: COMMERCIAL

## 2020-09-02 ENCOUNTER — OFFICE VISIT (OUTPATIENT)
Dept: CARDIOLOGY | Facility: CLINIC | Age: 80
End: 2020-09-02
Attending: PHYSICIAN ASSISTANT
Payer: COMMERCIAL

## 2020-09-02 VITALS
DIASTOLIC BLOOD PRESSURE: 78 MMHG | WEIGHT: 150.5 LBS | HEART RATE: 72 BPM | SYSTOLIC BLOOD PRESSURE: 154 MMHG | BODY MASS INDEX: 22.22 KG/M2

## 2020-09-02 DIAGNOSIS — I50.33 ACUTE ON CHRONIC HEART FAILURE WITH PRESERVED EJECTION FRACTION (HFPEF) (H): ICD-10-CM

## 2020-09-02 DIAGNOSIS — I50.33 ACUTE ON CHRONIC DIASTOLIC HEART FAILURE (H): Primary | ICD-10-CM

## 2020-09-02 LAB
ANION GAP SERPL CALCULATED.3IONS-SCNC: 3 MMOL/L (ref 3–14)
BUN SERPL-MCNC: 18 MG/DL (ref 7–30)
CALCIUM SERPL-MCNC: 8.7 MG/DL (ref 8.5–10.1)
CHLORIDE SERPL-SCNC: 103 MMOL/L (ref 94–109)
CO2 SERPL-SCNC: 31 MMOL/L (ref 20–32)
CREAT SERPL-MCNC: 1.24 MG/DL (ref 0.66–1.25)
GFR SERPL CREATININE-BSD FRML MDRD: 54 ML/MIN/{1.73_M2}
GLUCOSE SERPL-MCNC: 399 MG/DL (ref 70–99)
HGB BLD-MCNC: 10.6 G/DL (ref 13.3–17.7)
NT-PROBNP SERPL-MCNC: 2024 PG/ML (ref 0–450)
POTASSIUM SERPL-SCNC: 4.4 MMOL/L (ref 3.4–5.3)
SODIUM SERPL-SCNC: 137 MMOL/L (ref 133–144)

## 2020-09-02 PROCEDURE — 36415 COLL VENOUS BLD VENIPUNCTURE: CPT | Performed by: PHYSICIAN ASSISTANT

## 2020-09-02 PROCEDURE — 0296T LEADLESS EKG MONITOR 3 TO 14 DAYS: CPT

## 2020-09-02 PROCEDURE — 0298T LEADLESS EKG MONITOR 3 TO 14 DAYS: CPT | Performed by: PHYSICIAN ASSISTANT

## 2020-09-02 PROCEDURE — 85018 HEMOGLOBIN: CPT | Performed by: PHYSICIAN ASSISTANT

## 2020-09-02 PROCEDURE — 99214 OFFICE O/P EST MOD 30 MIN: CPT | Mod: 25 | Performed by: PHYSICIAN ASSISTANT

## 2020-09-02 PROCEDURE — 80048 BASIC METABOLIC PNL TOTAL CA: CPT | Performed by: PHYSICIAN ASSISTANT

## 2020-09-02 PROCEDURE — 83880 ASSAY OF NATRIURETIC PEPTIDE: CPT | Performed by: PHYSICIAN ASSISTANT

## 2020-09-02 NOTE — LETTER
9/2/2020    Charlie Finch MD  [address]  [facility] [address]    RE: Tc Garcia       Dear Colleague,    I had the pleasure of seeing Tc Garcia in the [facility].    Cardiology Clinic Progress Note    Tc Garcia MRN# [id number]   YOB: 1940 Age: 80 year old         History of Presenting Illness:      cT Garcia is a pleasant 80 year old patient of Dr. Bonilla () and Dr. Julien who presents today a C[facility] clinic follow up visit. He has been followed closely in the CORE clinic by Katlin Fontanez PA-C, and I am now following him as well in the [facility] clinic.     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 Baseline creat ~ 1.5.  # Obesity - Body mass index is 22.22 kg/m .   # Social - Lives with wife, Radha. Sedentary. High sodium diet, medication noncompliance, some concern over cognitive function    Recent admissions:  - Beginning of May 2020 with progressive dyspnea and due to recurrent (transudative) pleural effusion a left PleurX catheter was placed. He was diuresed with IV furosemide and given empiric abx therapy. Echo showed normal LVEF 55-60% with normal wall motion. The RV was moderately dilated with decreased systolic function and mild TR. He underwent a RHC during that stay that showed mild PH (PA pressure of 28 mmHg (51/15), mean RA of 4mmHg, and mildly elevated filling  "pressures with a wedge of 12mmHg (Vwave 16). PVR was 3.9 Wood units and Carlos Manuel CO/CI was 4.09/2.23. With vasodilator challenge, his PVR normalized to 1.22 wood units. Therefore, he was deemed to have pulmonary hypertension out of proportion to his left heart disease. He was started on Cialis 5mg daily but because this was not a formulated preparation, was switched to sildenafil 20 mg 3 times daily and discharged home. Interestingly, he was not discharged on any diuretics. Discharge wt was 152 lbs. His admission was complicated by atrial flutter for which he was given amiodarone but because of prolongation in QTc, this was discontinued, and he was continued on his PTA verapamil.    Following discharge he developed symptomatic hypotension and via phone was told to stop the sildenafil. He then saw Dr. Chad gusman for PH evaluation a few days later. He reported hypertension with SBP >200mmHg and weight was up 12 lbs from discharge. Torsemide 10 mg daily, Losartan 25 mg daily, and KCL 40 mEq were all started. She recommended resuming sildenafil once he was felt to be euvolemic.       - Readmitted from 5/21 to 5/29 with uncontrolled diabetes and recurrent acute HFpEF, related to medication noncompliance. He again required IV lasix and was discharged on Lasix 40mg BID in place of the torsemide. Due to renal concerns, his hydrochlorothiazide and losartan were held. Fortunately, home health was arranged to help him manage his medications at home.     - 7/8-7/10 for re-accumulating pleural effusion, at which time IR evaluated his pleurex and was able to place to suction with 550ml fluid removed. He was also diuresed with good result.   - 7/10-7/18 for weakness/fall resulting in head contusion. He was found to have an empyema and received antibiotics. His pleurex was removed and a chest tube was placed. CT drained \"nearly all the fluid\" and was removed day prior to discharge.    Tc had a follow-up phone visit with Katlin on " "8/11/20. His only complaint was of debilitating back pain for which he was seeing a spine specialist. He reported a stable weight from his most recent discharge, at 155 lbs. Labs showed a creatinine of 1.5, BUN 16, stable electrolytes, TSH slightly elevated but T4F WNL. She kept his Lasix the same at 40 mg BID and asked him to present to clinic for evaluation.     Last visit, on 8/19, I met Tc.  He told me his symptoms were stable - dyspneic with stair-climbing but otherwise does not notice it. His leg swelling was notable but reportedly stable (2+ to the knees, chronic venous stasis changes). His biggest complaint was of fatigue and ongoing back pain. His weight was up from 154 --> 161 lbs compared with his last clinic visit beginning of July. Similarly, his home weight had risen 5 lbs over the past two days after eating a large meal of pasta. I increased his Lasix to 80 qAM / 40 qPM, enrolled him in MHT and ordered a zio monitor for assessment of rate control in AF. I also enrolled in him pulmonary rehab.     After that, he quickly lost 10 lbs and I instructed him to return to his original Lasix dosing.     Today, he's feeling much better overall. He can't believe how great his legs look. I agree - he virtually has no swelling. He's not dyspneic at all. He has not gone back down to the 40 mg BID of Lasix; states he misunderstood the directions and remains on 80 qAM and 40 qPM. He's continuing to lose about a lb a day per MHT. His BP is again mildly uncontrolled however he did not take his medications yet this morning, similar to last visit. He denies chest pain, PND, orthopnea, edema, claudication, palpitations, near syncope or syncope. Generally gets poor sleep. No abdominal bloating, good appetite. \"Trying\" to attempt a low sodium diet, his wife helps him with this. His biggest complaint remains of back pain - He was told this should slowly improve with time. He had his initial meeting with pulmonary rehab " last week, but wants to wait to go back until he is less limited by his back. He was erroneously not scheduled for labs prior to our visit. He did just have his zio monitor placed.      Assessment/Plan:  1. Acute on chronic HFpEF / cor pulmonale, with recurrent admissions (four in past four months) due to multiple comorbidities including pleural effusion, medication noncompliance, poorly controlled diabetes and anemia.               -- His most recent echo from May 2020 shows preserved EF 55-60%, with normal wall motion. RV was mild to moderately dilated with mildly decreased RV function. He had a RHC during his hospital stay in May as below. He has since had recurrent hospitalizations in the setting of intermittent volume overload but also a recurrent pleural effusion/empyema.    -- Down 10 lbs to 144 lbs (per home scale) today with significant symptomatic improvement following furosemide increase to 80AM/40PM.   -- Currently enrolled in MHT with a goal range of 145-150 lbs. Today he weighs 144 lbs.    -- Will return to original dosing of Lasix 40 mg BID.    -- Labs today (BMP, proBNP, Hgb).   -- Counseled on sodium intake.    -- Once we are back to implanting, he should be considered for a cardioMEMs.                 2. Pulmonary hypertension.              --RHC in May 2020 showed PA pressure of 28 mmHg (51/15), mean RA of 4mmHg, and mildly elevated filling pressures with a wedge of 12mmHg (Vwave 16). PVR was 3.9 Wood units and Carlos Manuel CO/CI was 4.09/2.23. With vasodilator challenge, his PVR normalized to 1.22 wood units. Therefore, he was deemed to have pulmonary hypertension out of proportion to his left heart disease. Wt at that time was 150 lbs.               --As per Dr. Bonilla's recommendations, once he is euvolemic, our plan is to potentially reintroduce sildenafil. However, thus far, he has not proven to be a good or stable candidate for this.               -- Will return to pulmonary rehab once back pain is  less debilitating.      3. Paroxysmal atrial fibrillation/flutter.              -- Zio monitor placed today to ensure he is adequately rate-controlled on verapamil.               --He is on anticoagulation with apixaban 2.5mg BID due to age and renal function.     4. Hypertension.              -- Reportedly controlled at home, but he doesn't record them. I have asked him to record ambulatory BP's 1-2 hours post AM meds. Will ask RN to touch base with him next week to obtain numbers.     5. Anemia of chronic disease.   -- Most recent Hgb 8.7. Has received Fe infusions in the past with symptomatic improvement.     Follow-up:  - In clinic with me in two weeks + labs (BMP, proBNP).         Review of Systems:     12-pt ROS is negative except for as noted in the HPI.          Physical Exam:     Vitals: BP (!) 154/78   Pulse 72   Wt 68.3 kg (150 lb 8 oz)   BMI 22.22 kg/m    Wt Readings from Last 10 Encounters:   09/02/20 68.3 kg (150 lb 8 oz)   08/19/20 73 kg (161 lb)   08/11/20 70.3 kg (155 lb)   07/15/20 71.1 kg (156 lb 12 oz)   07/10/20 67.5 kg (148 lb 14.4 oz)   07/08/20 72.2 kg (159 lb 3.2 oz)   06/16/20 70.8 kg (156 lb)   06/05/20 67.5 kg (148 lb 12.8 oz)   05/29/20 69.4 kg (153 lb)   05/21/20 70.3 kg (155 lb)       Constitutional:  Patient is pleasant, alert, cooperative, and in NAD.  HEENT:  NCAT. PERRLA. EOM's intact.   Neck:  CVP appears normal. No carotid bruits.   Pulmonary: Normal respiratory effort. Diffusely reduced BS, no crackles or wheezes.  Cardiac: RRR, normal S1/S2, no S3/S4, no murmur or rub.   Abdomen:  Non-tender abdomen, no hepatosplenomegaly appreciated.   Vascular: Pulses in the upper and lower extremities are 2+ and equal bilaterally.  Extremities: No edema, erythema, cyanosis or tenderness appreciated.  Skin:  No rashes or lesions appreciated.   Neurological:  No gross motor or sensory deficits.   Psych: Appropriate affect.        Data:     Labs reviewed:  Recent Labs   Lab Test  08/10/20  1027 07/08/20  0944 06/16/20  1313 05/13/20  0553 05/12/20  0541 05/11/20  0542 04/25/20  1228   LDL  --   --   --  43  --   --   --    HDL  --   --   --  60  --   --   --    NHDL  --   --   --  56  --   --   --    CHOL  --   --   --  116  --   --   --    TRIG  --   --   --  65  --   --   --    TSH 4.75*  --   --   --  3.47  --  4.39*   NTBNP  --  2,210* 2,020*  --   --   --   --    IRON  --   --   --   --   --  16*  --    FEB  --   --   --   --   --  161*  --    IRONSAT  --   --   --   --   --  10*  --    ERNESTO  --   --   --   --   --  142  --        Lab Results   Component Value Date    WBC 10.1 07/30/2020    RBC 3.28 (L) 07/30/2020    HGB 8.7 (L) 07/30/2020    HCT 29.2 (L) 07/30/2020    MCV 89 07/30/2020    MCH 26.5 07/30/2020    MCHC 29.8 (L) 07/30/2020    RDW 16.9 (H) 07/30/2020     (H) 07/30/2020       Lab Results   Component Value Date     08/10/2020    POTASSIUM 3.7 08/10/2020    CHLORIDE 100 08/10/2020    CO2 30 (H) 08/10/2020    ANIONGAP 11.7 08/10/2020     (H) 08/10/2020    BUN 16 08/10/2020    CR 1.52 (H) 08/10/2020    GFRESTIMATED 44 (L) 08/10/2020    GFRESTBLACK 54 (L) 08/10/2020    LLOYD 8.9 08/10/2020      Lab Results   Component Value Date    AST 13 08/10/2020    ALT 11 08/10/2020       Lab Results   Component Value Date    A1C 7.4 (H) 07/08/2020       Lab Results   Component Value Date    INR 1.18 (H) 07/17/2020    INR 1.49 (H) 05/10/2020           Problem List:     Patient Active Problem List   Diagnosis     DM type 2, Hgb A1C 8.2 on 4/25/20     Mixed hyperlipidemia     Essential hypertension     Pain in limb     Plantar fascial fibromatosis     HYPERLIPIDEMIA LDL GOAL <100     Hemothorax on left     Recurrent Left Pleural effusion -- S/P Pleurex Cath 5/12/20     ABBIE (acute kidney injury) (H)     Acute respiratory failure with hypoxia (H)     Pulmonary hypertension (H)     CRF (chronic renal failure), stage 3      Anemia of chronic disease     Atrial fibrillation/flutter  -- on Eliquis and Amiodarone     DKA (diabetic ketoacidoses) (H)     Anemia in CKD (chronic kidney disease)     Dyspnea     SOB (shortness of breath)           Medications:     Current Outpatient Medications   Medication Sig Dispense Refill     acetaminophen (TYLENOL) 500 MG tablet Take 1,000 mg by mouth 3 times daily       albuterol (PROAIR HFA/PROVENTIL HFA/VENTOLIN HFA) 108 (90 Base) MCG/ACT inhaler Inhale 2 puffs into the lungs every 4 hours as needed for shortness of breath / dyspnea or wheezing Inhale 2 puffs every 4 to 6 hours as needed.       apixaban ANTICOAGULANT (ELIQUIS) 5 MG tablet Take 1/2 tablet (2.5mg) by mouth twice daily. You will have your labs checked on 5/4/20 and your PCP will direct you when it is safe to increase your dose back to 1 tablet (5mg) twice daily.       furosemide (LASIX) 40 MG tablet Take 1 tablet (40 mg) by mouth 2 times daily 270 tablet 1     glucagon 1 MG kit 1 mg as needed for low blood sugar       insulin aspart (NOVOLOG PEN) 100 UNIT/ML pen Inject 8 units subcutaneous with breakfast, 8 units subcutaneous with lunch and 4 units with supper. 3 mL 0     insulin glargine (LANTUS PEN) 100 UNIT/ML pen Inject 14 Units Subcutaneous every morning       losartan (COZAAR) 25 MG tablet Take 25 mg by mouth daily       LOVASTATIN 20 MG PO TABS 1 TABLET DAILY AT DINNER 90 Tab 0     nystatin (MYCOSTATIN) 380627 UNIT/GM external cream Apply topically 2 times daily as needed        potassium chloride ER (KLOR-CON M) 10 MEQ CR tablet Take 2 tablets (20 mEq) by mouth 2 times daily 360 tablet 1     tiotropium (SPIRIVA) 18 MCG inhaled capsule Inhale 18 mcg into the lungs daily       verapamil ER (VERELAN) 240 MG 24 hr capsule Take 1 capsule (240 mg) by mouth At Bedtime             Past Medical History:     Past Medical History:   Diagnosis Date     Anemia      Anemia of chronic disease 5/14/2020     CKD (chronic kidney disease) stage 3, GFR 30-59 ml/min (H)      CRF (chronic renal failure), stage  3  2020     Fall 2019    suffered multiple left rib fractures, C3 and T2 fractures     Mixed hyperlipidemia 2004     Paroxysmal atrial fibrillation (H)      Personal history of colonic polyps 1972    gets colonoscopy every five years, due in      Pleural effusion on left 2019    after multiple rib fractures     Pulmonary hypertension (H) 5/10/2020    Added automatically from request for surgery [id number]     Recurrent Left Pleural effusion -- S/P Pleurex Cath 2019     Rosacea      Type II or unspecified type diabetes mellitus without mention of complication, not stated as uncontrolled      Unspecified essential hypertension      Past Surgical History:   Procedure Laterality Date     ANESTHESIA CARDIOVERSION N/A 2/3/2020    Procedure: ANESTHESIA, FOR CARDIOVERSION;  Surgeon: GENERIC ANESTHESIA PROVIDER;  Location:  OR      RIGHT HEART CATH N/A 2020    Procedure: Right Heart Cath;  Surgeon: Senthil Silva MD;  Location:  HEART CARDIAC CATH LAB     HC REMOVE TONSILS/ADENOIDS,<11 Y/O      T & A <11y.o.     IR CHEST TUBE DRAIN TUNNELED LEFT  2020     IR CHEST TUBE PLACEMENT NON-TUNNELLED LEFT  2020     IR CHEST TUBE REMOVAL NON TUNNELED LEFT  2020     IR CHEST TUBE REMOVAL TUNNELED LEFT  2020     Family History   Problem Relation Age of Onset     Family History Negative Mother          age 71     Family History Negative Father          age 70     Diabetes Brother         alive age 77     Diabetes Brother         alive age 76     Family History Negative Brother              Family History Negative Brother              Diabetes Sister         alive age74     Family History Negative Sister          age 86     Heart Disease Sister              Family History Negative Sister              Family History Negative Sister              Diabetes Sister      Diabetes Sister      Diabetes  Brother      Diabetes Brother      Social History     Socioeconomic History     Marital status:      Spouse name: Lianna     Number of children: 4     Years of education: 16     Highest education level: Not on file   Occupational History     Occupation: NICORIER     Employer: [employer]    Social Needs     Financial resource strain: Not on file     Food insecurity     Worry: Not on file     Inability: Not on file     Transportation needs     Medical: Not on file     Non-medical: Not on file   Tobacco Use     Smoking status: Former Smoker     Packs/day: 0.20     Years: 40.00     Pack years: 8.00     Types: Cigarettes     Start date:      Last attempt to quit: 2008     Years since quittin.6     Smokeless tobacco: Never Used     Tobacco comment: occasional   Substance and Sexual Activity     Alcohol use: Not Currently     Alcohol/week: 35.0 standard drinks     Drug use: Not Currently     Sexual activity: Not on file   Lifestyle     Physical activity     Days per week: Not on file     Minutes per session: Not on file     Stress: Not on file   Relationships     Social connections     Talks on phone: Not on file     Gets together: Not on file     Attends Muslim service: Not on file     Active member of club or organization: Not on file     Attends meetings of clubs or organizations: Not on file     Relationship status: Not on file     Intimate partner violence     Fear of current or ex partner: Not on file     Emotionally abused: Not on file     Physically abused: Not on file     Forced sexual activity: Not on file   Other Topics Concern     Parent/sibling w/ CABG, MI or angioplasty before 65F 55M? Not Asked   Social History Narrative     Not on file           Allergies:   No known drug allergies      KAMILLA Espino [facility]  Pager: [phone]      Thank you for allowing me to participate in the care of your patient.    Sincerely,     Ame Mejía  PAWinstonC     [facility]

## 2020-09-02 NOTE — PATIENT INSTRUCTIONS
Today, we discussed the following:   - Results: Please have the blood drawn today.   - Medication changes:  Go back down to the 40 mg of Lasix (furosemide) twice daily.   - Follow up: Call me next week to let me know how your blood pressure have been running at home. Check it at least one hour after your AM medications.    Return to clinic in 2 weeks to see me with a blood test beforehand.     Please, remember to continue the followin.  Weigh yourself daily. Call if your weight is up > than 2 pounds in one day, or 5 pounds in one week; if you feel more short of breath or have worsening swelling in your legs or abdomen.  2.  Eat a low sodium diet (less than 2,000mg or 2g daily). If you eat less salt, you will retain less fluid.   3.  Avoid alcohol, as this can worsen heart failure.   4.  Avoid NSAIDs as able (For example, Ibuprofen / aleve / advil / naprosen / diclofenac).    Please call CORE nurse for any questions or concerns Mon-Fri 8am-4pm:   [phone]  For concerns after hours:   [phone] option 2   Scheduling phone number:   [phone]    Thank you for visiting with us today.   Ame Mejía PA-C  ______________________________________________________________

## 2020-09-02 NOTE — LETTER
9/2/2020    Charlie Finch MD  [address]  [facility] [address]    RE: Tc Garcia       Dear Colleague,    I had the pleasure of seeing Tc Garcia in the [facility].      Cardiology Clinic Progress Note    Tc Garcia MRN# [id number]   YOB: 1940 Age: 80 year old         History of Presenting Illness:      Tc Garcia is a pleasant 80 year old patient of Dr. Bonilla () and Dr. Julien who presents today a C[facility] clinic follow up visit. He has been followed closely in the CORE clinic by Katlin Fontanez PA-C, and I am now following him as well in the [facility] clinic.     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 Baseline creat ~ 1.5.  # Obesity - Body mass index is 22.22 kg/m .   # Social - Lives with wife, Radha. Sedentary. High sodium diet, medication noncompliance, some concern over cognitive function    Recent admissions:  - Beginning of May 2020 with progressive dyspnea and due to recurrent (transudative) pleural effusion a left PleurX catheter was placed. He was diuresed with IV furosemide and given empiric abx therapy. Echo showed normal LVEF 55-60% with normal wall motion. The RV was moderately dilated with decreased systolic function and mild TR. He underwent a RHC during that stay that showed mild PH (PA pressure of 28 mmHg (51/15), mean RA of 4mmHg, and mildly elevated filling  "pressures with a wedge of 12mmHg (Vwave 16). PVR was 3.9 Wood units and Carlos Manuel CO/CI was 4.09/2.23. With vasodilator challenge, his PVR normalized to 1.22 wood units. Therefore, he was deemed to have pulmonary hypertension out of proportion to his left heart disease. He was started on Cialis 5mg daily but because this was not a formulated preparation, was switched to sildenafil 20 mg 3 times daily and discharged home. Interestingly, he was not discharged on any diuretics. Discharge wt was 152 lbs. His admission was complicated by atrial flutter for which he was given amiodarone but because of prolongation in QTc, this was discontinued, and he was continued on his PTA verapamil.    Following discharge he developed symptomatic hypotension and via phone was told to stop the sildenafil. He then saw Dr. Chad gusman for PH evaluation a few days later. He reported hypertension with SBP >200mmHg and weight was up 12 lbs from discharge. Torsemide 10 mg daily, Losartan 25 mg daily, and KCL 40 mEq were all started. She recommended resuming sildenafil once he was felt to be euvolemic.       - Readmitted from 5/21 to 5/29 with uncontrolled diabetes and recurrent acute HFpEF, related to medication noncompliance. He again required IV lasix and was discharged on Lasix 40mg BID in place of the torsemide. Due to renal concerns, his hydrochlorothiazide and losartan were held. Fortunately, home health was arranged to help him manage his medications at home.     - 7/8-7/10 for re-accumulating pleural effusion, at which time IR evaluated his pleurex and was able to place to suction with 550ml fluid removed. He was also diuresed with good result.   - 7/10-7/18 for weakness/fall resulting in head contusion. He was found to have an empyema and received antibiotics. His pleurex was removed and a chest tube was placed. CT drained \"nearly all the fluid\" and was removed day prior to discharge.    Tc had a follow-up phone visit with Katlin on " "8/11/20. His only complaint was of debilitating back pain for which he was seeing a spine specialist. He reported a stable weight from his most recent discharge, at 155 lbs. Labs showed a creatinine of 1.5, BUN 16, stable electrolytes, TSH slightly elevated but T4F WNL. She kept his Lasix the same at 40 mg BID and asked him to present to clinic for evaluation.     Last visit, on 8/19, I met Tc.  He told me his symptoms were stable - dyspneic with stair-climbing but otherwise does not notice it. His leg swelling was notable but reportedly stable (2+ to the knees, chronic venous stasis changes). His biggest complaint was of fatigue and ongoing back pain. His weight was up from 154 --> 161 lbs compared with his last clinic visit beginning of July. Similarly, his home weight had risen 5 lbs over the past two days after eating a large meal of pasta. I increased his Lasix to 80 qAM / 40 qPM, enrolled him in MHT and ordered a zio monitor for assessment of rate control in AF. I also enrolled in him pulmonary rehab.     After that, he quickly lost 10 lbs and I instructed him to return to his original Lasix dosing.     Today, he's feeling much better overall. He can't believe how great his legs look. I agree - he virtually has no swelling. He's not dyspneic at all. He has not gone back down to the 40 mg BID of Lasix; states he misunderstood the directions and remains on 80 qAM and 40 qPM. He's continuing to lose about a lb a day per MHT. His BP is again mildly uncontrolled however he did not take his medications yet this morning, similar to last visit. He denies chest pain, PND, orthopnea, edema, claudication, palpitations, near syncope or syncope. Generally gets poor sleep. No abdominal bloating, good appetite. \"Trying\" to attempt a low sodium diet, his wife helps him with this. His biggest complaint remains of back pain - He was told this should slowly improve with time. He had his initial meeting with pulmonary rehab " last week, but wants to wait to go back until he is less limited by his back. He was erroneously not scheduled for labs prior to our visit. He did just have his zio monitor placed.      Assessment/Plan:  1. Acute on chronic HFpEF / cor pulmonale, with recurrent admissions (four in past four months) due to multiple comorbidities including pleural effusion, medication noncompliance, poorly controlled diabetes and anemia.               -- His most recent echo from May 2020 shows preserved EF 55-60%, with normal wall motion. RV was mild to moderately dilated with mildly decreased RV function. He had a RHC during his hospital stay in May as below. He has since had recurrent hospitalizations in the setting of intermittent volume overload but also a recurrent pleural effusion/empyema.    -- Down 10 lbs to 144 lbs (per home scale) today with significant symptomatic improvement following furosemide increase to 80AM/40PM.   -- Currently enrolled in MHT with a goal range of 145-150 lbs. Today he weighs 144 lbs.    -- Will return to original dosing of Lasix 40 mg BID.    -- Labs today (BMP, proBNP, Hgb).   -- Counseled on sodium intake.    -- Once we are back to implanting, he should be considered for a cardioMEMs.                 2. Pulmonary hypertension.              --RHC in May 2020 showed PA pressure of 28 mmHg (51/15), mean RA of 4mmHg, and mildly elevated filling pressures with a wedge of 12mmHg (Vwave 16). PVR was 3.9 Wood units and Carlos Manuel CO/CI was 4.09/2.23. With vasodilator challenge, his PVR normalized to 1.22 wood units. Therefore, he was deemed to have pulmonary hypertension out of proportion to his left heart disease. Wt at that time was 150 lbs.               --As per Dr. Bonilla's recommendations, once he is euvolemic, our plan is to potentially reintroduce sildenafil. However, thus far, he has not proven to be a good or stable candidate for this.               -- Will return to pulmonary rehab once back pain is  less debilitating.      3. Paroxysmal atrial fibrillation/flutter.              -- Zio monitor placed today to ensure he is adequately rate-controlled on verapamil.               --He is on anticoagulation with apixaban 2.5mg BID due to age and renal function.     4. Hypertension.              -- Reportedly controlled at home, but he doesn't record them. I have asked him to record ambulatory BP's 1-2 hours post AM meds. Will ask RN to touch base with him next week to obtain numbers.     5. Anemia of chronic disease.   -- Most recent Hgb 8.7. Has received Fe infusions in the past with symptomatic improvement.     Follow-up:  - In clinic with me in two weeks + labs (BMP, proBNP).         Review of Systems:     12-pt ROS is negative except for as noted in the HPI.          Physical Exam:     Vitals: BP (!) 154/78   Pulse 72   Wt 68.3 kg (150 lb 8 oz)   BMI 22.22 kg/m    Wt Readings from Last 10 Encounters:   09/02/20 68.3 kg (150 lb 8 oz)   08/19/20 73 kg (161 lb)   08/11/20 70.3 kg (155 lb)   07/15/20 71.1 kg (156 lb 12 oz)   07/10/20 67.5 kg (148 lb 14.4 oz)   07/08/20 72.2 kg (159 lb 3.2 oz)   06/16/20 70.8 kg (156 lb)   06/05/20 67.5 kg (148 lb 12.8 oz)   05/29/20 69.4 kg (153 lb)   05/21/20 70.3 kg (155 lb)       Constitutional:  Patient is pleasant, alert, cooperative, and in NAD.  HEENT:  NCAT. PERRLA. EOM's intact.   Neck:  CVP appears normal. No carotid bruits.   Pulmonary: Normal respiratory effort. Diffusely reduced BS, no crackles or wheezes.  Cardiac: RRR, normal S1/S2, no S3/S4, no murmur or rub.   Abdomen:  Non-tender abdomen, no hepatosplenomegaly appreciated.   Vascular: Pulses in the upper and lower extremities are 2+ and equal bilaterally.  Extremities: No edema, erythema, cyanosis or tenderness appreciated.  Skin:  No rashes or lesions appreciated.   Neurological:  No gross motor or sensory deficits.   Psych: Appropriate affect.        Data:     Labs reviewed:  Recent Labs   Lab Test  08/10/20  1027 07/08/20  0944 06/16/20  1313 05/13/20  0553 05/12/20  0541 05/11/20  0542 04/25/20  1228   LDL  --   --   --  43  --   --   --    HDL  --   --   --  60  --   --   --    NHDL  --   --   --  56  --   --   --    CHOL  --   --   --  116  --   --   --    TRIG  --   --   --  65  --   --   --    TSH 4.75*  --   --   --  3.47  --  4.39*   NTBNP  --  2,210* 2,020*  --   --   --   --    IRON  --   --   --   --   --  16*  --    FEB  --   --   --   --   --  161*  --    IRONSAT  --   --   --   --   --  10*  --    ERNESTO  --   --   --   --   --  142  --        Lab Results   Component Value Date    WBC 10.1 07/30/2020    RBC 3.28 (L) 07/30/2020    HGB 8.7 (L) 07/30/2020    HCT 29.2 (L) 07/30/2020    MCV 89 07/30/2020    MCH 26.5 07/30/2020    MCHC 29.8 (L) 07/30/2020    RDW 16.9 (H) 07/30/2020     (H) 07/30/2020       Lab Results   Component Value Date     08/10/2020    POTASSIUM 3.7 08/10/2020    CHLORIDE 100 08/10/2020    CO2 30 (H) 08/10/2020    ANIONGAP 11.7 08/10/2020     (H) 08/10/2020    BUN 16 08/10/2020    CR 1.52 (H) 08/10/2020    GFRESTIMATED 44 (L) 08/10/2020    GFRESTBLACK 54 (L) 08/10/2020    LLOYD 8.9 08/10/2020      Lab Results   Component Value Date    AST 13 08/10/2020    ALT 11 08/10/2020       Lab Results   Component Value Date    A1C 7.4 (H) 07/08/2020       Lab Results   Component Value Date    INR 1.18 (H) 07/17/2020    INR 1.49 (H) 05/10/2020           Problem List:     Patient Active Problem List   Diagnosis     DM type 2, Hgb A1C 8.2 on 4/25/20     Mixed hyperlipidemia     Essential hypertension     Pain in limb     Plantar fascial fibromatosis     HYPERLIPIDEMIA LDL GOAL <100     Hemothorax on left     Recurrent Left Pleural effusion -- S/P Pleurex Cath 5/12/20     ABBIE (acute kidney injury) (H)     Acute respiratory failure with hypoxia (H)     Pulmonary hypertension (H)     CRF (chronic renal failure), stage 3      Anemia of chronic disease     Atrial fibrillation/flutter  -- on Eliquis and Amiodarone     DKA (diabetic ketoacidoses) (H)     Anemia in CKD (chronic kidney disease)     Dyspnea     SOB (shortness of breath)           Medications:     Current Outpatient Medications   Medication Sig Dispense Refill     acetaminophen (TYLENOL) 500 MG tablet Take 1,000 mg by mouth 3 times daily       albuterol (PROAIR HFA/PROVENTIL HFA/VENTOLIN HFA) 108 (90 Base) MCG/ACT inhaler Inhale 2 puffs into the lungs every 4 hours as needed for shortness of breath / dyspnea or wheezing Inhale 2 puffs every 4 to 6 hours as needed.       apixaban ANTICOAGULANT (ELIQUIS) 5 MG tablet Take 1/2 tablet (2.5mg) by mouth twice daily. You will have your labs checked on 5/4/20 and your PCP will direct you when it is safe to increase your dose back to 1 tablet (5mg) twice daily.       furosemide (LASIX) 40 MG tablet Take 1 tablet (40 mg) by mouth 2 times daily 270 tablet 1     glucagon 1 MG kit 1 mg as needed for low blood sugar       insulin aspart (NOVOLOG PEN) 100 UNIT/ML pen Inject 8 units subcutaneous with breakfast, 8 units subcutaneous with lunch and 4 units with supper. 3 mL 0     insulin glargine (LANTUS PEN) 100 UNIT/ML pen Inject 14 Units Subcutaneous every morning       losartan (COZAAR) 25 MG tablet Take 25 mg by mouth daily       LOVASTATIN 20 MG PO TABS 1 TABLET DAILY AT DINNER 90 Tab 0     nystatin (MYCOSTATIN) 210204 UNIT/GM external cream Apply topically 2 times daily as needed        potassium chloride ER (KLOR-CON M) 10 MEQ CR tablet Take 2 tablets (20 mEq) by mouth 2 times daily 360 tablet 1     tiotropium (SPIRIVA) 18 MCG inhaled capsule Inhale 18 mcg into the lungs daily       verapamil ER (VERELAN) 240 MG 24 hr capsule Take 1 capsule (240 mg) by mouth At Bedtime             Past Medical History:     Past Medical History:   Diagnosis Date     Anemia      Anemia of chronic disease 5/14/2020     CKD (chronic kidney disease) stage 3, GFR 30-59 ml/min (H)      CRF (chronic renal failure), stage  3  2020     Fall 2019    suffered multiple left rib fractures, C3 and T2 fractures     Mixed hyperlipidemia 2004     Paroxysmal atrial fibrillation (H)      Personal history of colonic polyps 1972    gets colonoscopy every five years, due in      Pleural effusion on left 2019    after multiple rib fractures     Pulmonary hypertension (H) 5/10/2020    Added automatically from request for surgery [id number]     Recurrent Left Pleural effusion -- S/P Pleurex Cath 2019     Rosacea      Type II or unspecified type diabetes mellitus without mention of complication, not stated as uncontrolled      Unspecified essential hypertension      Past Surgical History:   Procedure Laterality Date     ANESTHESIA CARDIOVERSION N/A 2/3/2020    Procedure: ANESTHESIA, FOR CARDIOVERSION;  Surgeon: GENERIC ANESTHESIA PROVIDER;  Location:  OR      RIGHT HEART CATH N/A 2020    Procedure: Right Heart Cath;  Surgeon: Senthil Silva MD;  Location:  HEART CARDIAC CATH LAB     HC REMOVE TONSILS/ADENOIDS,<11 Y/O      T & A <11y.o.     IR CHEST TUBE DRAIN TUNNELED LEFT  2020     IR CHEST TUBE PLACEMENT NON-TUNNELLED LEFT  2020     IR CHEST TUBE REMOVAL NON TUNNELED LEFT  2020     IR CHEST TUBE REMOVAL TUNNELED LEFT  2020     Family History   Problem Relation Age of Onset     Family History Negative Mother          age 71     Family History Negative Father          age 70     Diabetes Brother         alive age 77     Diabetes Brother         alive age 76     Family History Negative Brother              Family History Negative Brother              Diabetes Sister         alive age76     Family History Negative Sister          age 86     Heart Disease Sister              Family History Negative Sister              Family History Negative Sister              Diabetes Sister      Diabetes Sister      Diabetes  Brother      Diabetes Brother      Social History     Socioeconomic History     Marital status:      Spouse name: Lianna     Number of children: 4     Years of education: 16     Highest education level: Not on file   Occupational History     Occupation: NICORIER     Employer: [employer]    Social Needs     Financial resource strain: Not on file     Food insecurity     Worry: Not on file     Inability: Not on file     Transportation needs     Medical: Not on file     Non-medical: Not on file   Tobacco Use     Smoking status: Former Smoker     Packs/day: 0.20     Years: 40.00     Pack years: 8.00     Types: Cigarettes     Start date:      Last attempt to quit: 2008     Years since quittin.6     Smokeless tobacco: Never Used     Tobacco comment: occasional   Substance and Sexual Activity     Alcohol use: Not Currently     Alcohol/week: 35.0 standard drinks     Drug use: Not Currently     Sexual activity: Not on file   Lifestyle     Physical activity     Days per week: Not on file     Minutes per session: Not on file     Stress: Not on file   Relationships     Social connections     Talks on phone: Not on file     Gets together: Not on file     Attends Quaker service: Not on file     Active member of club or organization: Not on file     Attends meetings of clubs or organizations: Not on file     Relationship status: Not on file     Intimate partner violence     Fear of current or ex partner: Not on file     Emotionally abused: Not on file     Physically abused: Not on file     Forced sexual activity: Not on file   Other Topics Concern     Parent/sibling w/ CABG, MI or angioplasty before 65F 55M? Not Asked   Social History Narrative     Not on file           Allergies:   No known drug allergies      KAMILLA Espino [facility]  Pager: [phone]      Thank you for allowing me to participate in the care of your patient.      Sincerely,     Ame Mejía  KAMILLA     [facility] [place]    cc:   Ame Mejía PA-C  [id number]  [place] [address]

## 2020-09-02 NOTE — PROGRESS NOTES
Cardiology Clinic Progress Note    Tc Garcia MRN# [id number]   YOB: 1940 Age: 80 year old         History of Presenting Illness:      Tc Garcia is a pleasant 80 year old patient of Dr. Bonilla () and Dr. Julien who presents today a [facility] clinic follow up visit. He has been followed closely in the [facility] clinic by Katlin Fontanez PA-C, and I am now following him as well in the [facility] clinic.     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 Baseline creat ~ 1.5.  # Obesity - Body mass index is 22.22 kg/m .   # Social - Lives with wife, Radha. Sedentary. High sodium diet, medication noncompliance, some concern over cognitive function    Recent admissions:  - Beginning of May 2020 with progressive dyspnea and due to recurrent (transudative) pleural effusion a left PleurX catheter was placed. He was diuresed with IV furosemide and given empiric abx therapy. Echo showed normal LVEF 55-60% with normal wall motion. The RV was moderately dilated with decreased systolic function and mild TR. He underwent a RHC during that stay that showed mild PH (PA pressure of 28 mmHg (51/15), mean RA of 4mmHg, and mildly elevated filling pressures with a wedge of 12mmHg (Vwave 16). PVR was 3.9 Wood units and Carlos Manuel CO/CI was 4.09/2.23. With vasodilator challenge, his PVR normalized to 1.22 wood units. Therefore, he was deemed to have pulmonary hypertension out  "of proportion to his left heart disease. He was started on Cialis 5mg daily but because this was not a formulated preparation, was switched to sildenafil 20 mg 3 times daily and discharged home. Interestingly, he was not discharged on any diuretics. Discharge wt was 152 lbs. His admission was complicated by atrial flutter for which he was given amiodarone but because of prolongation in QTc, this was discontinued, and he was continued on his PTA verapamil.    Following discharge he developed symptomatic hypotension and via phone was told to stop the sildenafil. He then saw Dr. Chad gusman for PH evaluation a few days later. He reported hypertension with SBP >200mmHg and weight was up 12 lbs from discharge. Torsemide 10 mg daily, Losartan 25 mg daily, and KCL 40 mEq were all started. She recommended resuming sildenafil once he was felt to be euvolemic.       - Readmitted from 5/21 to 5/29 with uncontrolled diabetes and recurrent acute HFpEF, related to medication noncompliance. He again required IV lasix and was discharged on Lasix 40mg BID in place of the torsemide. Due to renal concerns, his hydrochlorothiazide and losartan were held. Fortunately, home health was arranged to help him manage his medications at home.     - 7/8-7/10 for re-accumulating pleural effusion, at which time IR evaluated his pleurex and was able to place to suction with 550ml fluid removed. He was also diuresed with good result.   - 7/10-7/18 for weakness/fall resulting in head contusion. He was found to have an empyema and received antibiotics. His pleurex was removed and a chest tube was placed. CT drained \"nearly all the fluid\" and was removed day prior to discharge.    Tc had a follow-up phone visit with Katlin on 8/11/20. His only complaint was of debilitating back pain for which he was seeing a spine specialist. He reported a stable weight from his most recent discharge, at 155 lbs. Labs showed a creatinine of 1.5, BUN 16, stable " "electrolytes, TSH slightly elevated but T4F WNL. She kept his Lasix the same at 40 mg BID and asked him to present to clinic for evaluation.     Last visit, on 8/19, I met Tc.  He told me his symptoms were stable - dyspneic with stair-climbing but otherwise does not notice it. His leg swelling was notable but reportedly stable (2+ to the knees, chronic venous stasis changes). His biggest complaint was of fatigue and ongoing back pain. His weight was up from 154 --> 161 lbs compared with his last clinic visit beginning of July. Similarly, his home weight had risen 5 lbs over the past two days after eating a large meal of pasta. I increased his Lasix to 80 qAM / 40 qPM, enrolled him in MHT and ordered a zio monitor for assessment of rate control in AF. I also enrolled in him pulmonary rehab.     After that, he quickly lost 10 lbs and I instructed him to return to his original Lasix dosing.     Today, he's feeling much better overall. He can't believe how great his legs look. I agree - he virtually has no swelling. He's not dyspneic at all. He has not gone back down to the 40 mg BID of Lasix; states he misunderstood the directions and remains on 80 qAM and 40 qPM. He's continuing to lose about a lb a day per MHT. His BP is again mildly uncontrolled however he did not take his medications yet this morning, similar to last visit. He denies chest pain, PND, orthopnea, edema, claudication, palpitations, near syncope or syncope. Generally gets poor sleep. No abdominal bloating, good appetite. \"Trying\" to attempt a low sodium diet, his wife helps him with this. His biggest complaint remains of back pain - He was told this should slowly improve with time. He had his initial meeting with pulmonary rehab last week, but wants to wait to go back until he is less limited by his back. He was erroneously not scheduled for labs prior to our visit. He did just have his zio monitor placed.      Assessment/Plan:  1. Acute on chronic " HFpEF / cor pulmonale, with recurrent admissions (four in past four months) due to multiple comorbidities including pleural effusion, medication noncompliance, poorly controlled diabetes and anemia.               -- His most recent echo from May 2020 shows preserved EF 55-60%, with normal wall motion. RV was mild to moderately dilated with mildly decreased RV function. He had a RHC during his hospital stay in May as below. He has since had recurrent hospitalizations in the setting of intermittent volume overload but also a recurrent pleural effusion/empyema.    -- Down 10 lbs to 144 lbs (per home scale) today with significant symptomatic improvement following furosemide increase to 80AM/40PM.   -- Currently enrolled in MHT with a goal range of 145-150 lbs. Today he weighs 144 lbs.    -- Will return to original dosing of Lasix 40 mg BID.    -- Labs today (BMP, proBNP, Hgb).   -- Counseled on sodium intake.    -- Once we are back to implanting, he should be considered for a cardioMEMs.                 2. Pulmonary hypertension.              --RHC in May 2020 showed PA pressure of 28 mmHg (51/15), mean RA of 4mmHg, and mildly elevated filling pressures with a wedge of 12mmHg (Vwave 16). PVR was 3.9 Wood units and Carlos Manuel CO/CI was 4.09/2.23. With vasodilator challenge, his PVR normalized to 1.22 wood units. Therefore, he was deemed to have pulmonary hypertension out of proportion to his left heart disease. Wt at that time was 150 lbs.               --As per Dr. Bonilla's recommendations, once he is euvolemic, our plan is to potentially reintroduce sildenafil. However, thus far, he has not proven to be a good or stable candidate for this.               -- Will return to pulmonary rehab once back pain is less debilitating.      3. Paroxysmal atrial fibrillation/flutter.              -- Zio monitor placed today to ensure he is adequately rate-controlled on verapamil.               --He is on anticoagulation with apixaban 2.5mg  BID due to age and renal function.     4. Hypertension.              -- Reportedly controlled at home, but he doesn't record them. I have asked him to record ambulatory BP's 1-2 hours post AM meds. Will ask RN to touch base with him next week to obtain numbers.     5. Anemia of chronic disease.   -- Most recent Hgb 8.7. Has received Fe infusions in the past with symptomatic improvement.     Follow-up:  - In clinic with me in two weeks + labs (BMP, proBNP).         Review of Systems:     12-pt ROS is negative except for as noted in the HPI.          Physical Exam:     Vitals: BP (!) 154/78   Pulse 72   Wt 68.3 kg (150 lb 8 oz)   BMI 22.22 kg/m    Wt Readings from Last 10 Encounters:   09/02/20 68.3 kg (150 lb 8 oz)   08/19/20 73 kg (161 lb)   08/11/20 70.3 kg (155 lb)   07/15/20 71.1 kg (156 lb 12 oz)   07/10/20 67.5 kg (148 lb 14.4 oz)   07/08/20 72.2 kg (159 lb 3.2 oz)   06/16/20 70.8 kg (156 lb)   06/05/20 67.5 kg (148 lb 12.8 oz)   05/29/20 69.4 kg (153 lb)   05/21/20 70.3 kg (155 lb)       Constitutional:  Patient is pleasant, alert, cooperative, and in NAD.  HEENT:  NCAT. PERRLA. EOM's intact.   Neck:  CVP appears normal. No carotid bruits.   Pulmonary: Normal respiratory effort. Diffusely reduced BS, no crackles or wheezes.  Cardiac: RRR, normal S1/S2, no S3/S4, no murmur or rub.   Abdomen:  Non-tender abdomen, no hepatosplenomegaly appreciated.   Vascular: Pulses in the upper and lower extremities are 2+ and equal bilaterally.  Extremities: No edema, erythema, cyanosis or tenderness appreciated.  Skin:  No rashes or lesions appreciated.   Neurological:  No gross motor or sensory deficits.   Psych: Appropriate affect.        Data:     Labs reviewed:  Recent Labs   Lab Test 08/10/20  1027 07/08/20  0944 06/16/20  1313 05/13/20  0553 05/12/20  0541 05/11/20  0542 04/25/20  1228   LDL  --   --   --  43  --   --   --    HDL  --   --   --  60  --   --   --    NHDL  --   --   --  56  --   --   --    CHOL  --   --    --  116  --   --   --    TRIG  --   --   --  65  --   --   --    TSH 4.75*  --   --   --  3.47  --  4.39*   NTBNP  --  2,210* 2,020*  --   --   --   --    IRON  --   --   --   --   --  16*  --    FEB  --   --   --   --   --  161*  --    IRONSAT  --   --   --   --   --  10*  --    ERNESTO  --   --   --   --   --  142  --        Lab Results   Component Value Date    WBC 10.1 07/30/2020    RBC 3.28 (L) 07/30/2020    HGB 8.7 (L) 07/30/2020    HCT 29.2 (L) 07/30/2020    MCV 89 07/30/2020    MCH 26.5 07/30/2020    MCHC 29.8 (L) 07/30/2020    RDW 16.9 (H) 07/30/2020     (H) 07/30/2020       Lab Results   Component Value Date     08/10/2020    POTASSIUM 3.7 08/10/2020    CHLORIDE 100 08/10/2020    CO2 30 (H) 08/10/2020    ANIONGAP 11.7 08/10/2020     (H) 08/10/2020    BUN 16 08/10/2020    CR 1.52 (H) 08/10/2020    GFRESTIMATED 44 (L) 08/10/2020    GFRESTBLACK 54 (L) 08/10/2020    LLOYD 8.9 08/10/2020      Lab Results   Component Value Date    AST 13 08/10/2020    ALT 11 08/10/2020       Lab Results   Component Value Date    A1C 7.4 (H) 07/08/2020       Lab Results   Component Value Date    INR 1.18 (H) 07/17/2020    INR 1.49 (H) 05/10/2020           Problem List:     Patient Active Problem List   Diagnosis     DM type 2, Hgb A1C 8.2 on 4/25/20     Mixed hyperlipidemia     Essential hypertension     Pain in limb     Plantar fascial fibromatosis     HYPERLIPIDEMIA LDL GOAL <100     Hemothorax on left     Recurrent Left Pleural effusion -- S/P Pleurex Cath 5/12/20     ABBIE (acute kidney injury) (H)     Acute respiratory failure with hypoxia (H)     Pulmonary hypertension (H)     CRF (chronic renal failure), stage 3      Anemia of chronic disease     Atrial fibrillation/flutter -- on Eliquis and Amiodarone     DKA (diabetic ketoacidoses) (H)     Anemia in CKD (chronic kidney disease)     Dyspnea     SOB (shortness of breath)           Medications:     Current Outpatient Medications   Medication Sig Dispense  Refill     acetaminophen (TYLENOL) 500 MG tablet Take 1,000 mg by mouth 3 times daily       albuterol (PROAIR HFA/PROVENTIL HFA/VENTOLIN HFA) 108 (90 Base) MCG/ACT inhaler Inhale 2 puffs into the lungs every 4 hours as needed for shortness of breath / dyspnea or wheezing Inhale 2 puffs every 4 to 6 hours as needed.       apixaban ANTICOAGULANT (ELIQUIS) 5 MG tablet Take 1/2 tablet (2.5mg) by mouth twice daily. You will have your labs checked on 5/4/20 and your PCP will direct you when it is safe to increase your dose back to 1 tablet (5mg) twice daily.       furosemide (LASIX) 40 MG tablet Take 1 tablet (40 mg) by mouth 2 times daily 270 tablet 1     glucagon 1 MG kit 1 mg as needed for low blood sugar       insulin aspart (NOVOLOG PEN) 100 UNIT/ML pen Inject 8 units subcutaneous with breakfast, 8 units subcutaneous with lunch and 4 units with supper. 3 mL 0     insulin glargine (LANTUS PEN) 100 UNIT/ML pen Inject 14 Units Subcutaneous every morning       losartan (COZAAR) 25 MG tablet Take 25 mg by mouth daily       LOVASTATIN 20 MG PO TABS 1 TABLET DAILY AT DINNER 90 Tab 0     nystatin (MYCOSTATIN) 153945 UNIT/GM external cream Apply topically 2 times daily as needed        potassium chloride ER (KLOR-CON M) 10 MEQ CR tablet Take 2 tablets (20 mEq) by mouth 2 times daily 360 tablet 1     tiotropium (SPIRIVA) 18 MCG inhaled capsule Inhale 18 mcg into the lungs daily       verapamil ER (VERELAN) 240 MG 24 hr capsule Take 1 capsule (240 mg) by mouth At Bedtime             Past Medical History:     Past Medical History:   Diagnosis Date     Anemia      Anemia of chronic disease 5/14/2020     CKD (chronic kidney disease) stage 3, GFR 30-59 ml/min (H)      CRF (chronic renal failure), stage 3  5/14/2020     Fall 11/2019    suffered multiple left rib fractures, C3 and T2 fractures     Mixed hyperlipidemia 7/7/2004     Paroxysmal atrial fibrillation (H)      Personal history of colonic polyps 1972    gets colonoscopy  every five years, due in      Pleural effusion on left 2019    after multiple rib fractures     Pulmonary hypertension (H) 5/10/2020    Added automatically from request for surgery [id number]     Recurrent Left Pleural effusion -- S/P Pleurex Cath 2019     Rosacea      Type II or unspecified type diabetes mellitus without mention of complication, not stated as uncontrolled      Unspecified essential hypertension      Past Surgical History:   Procedure Laterality Date     ANESTHESIA CARDIOVERSION N/A 2/3/2020    Procedure: ANESTHESIA, FOR CARDIOVERSION;  Surgeon: GENERIC ANESTHESIA PROVIDER;  Location:  OR     CV RIGHT HEART CATH N/A 2020    Procedure: Right Heart Cath;  Surgeon: Senthil Silva MD;  Location:  HEART CARDIAC CATH LAB     HC REMOVE TONSILS/ADENOIDS,<11 Y/O      T & A <11y.o.     IR CHEST TUBE DRAIN TUNNELED LEFT  2020     IR CHEST TUBE PLACEMENT NON-TUNNELLED LEFT  2020     IR CHEST TUBE REMOVAL NON TUNNELED LEFT  2020     IR CHEST TUBE REMOVAL TUNNELED LEFT  2020     Family History   Problem Relation Age of Onset     Family History Negative Mother          age 71     Family History Negative Father          age 70     Diabetes Brother         alive age 77     Diabetes Brother         alive age 76     Family History Negative Brother              Family History Negative Brother              Diabetes Sister         alive age74     Family History Negative Sister          age 86     Heart Disease Sister              Family History Negative Sister              Family History Negative Sister              Diabetes Sister      Diabetes Sister      Diabetes Brother      Diabetes Brother      Social History     Socioeconomic History     Marital status:      Spouse name: Lianna     Number of children: 4     Years of education: 16     Highest education level: Not on file    Occupational History     Occupation: NANCI     Employer: [employer]    Social Needs     Financial resource strain: Not on file     Food insecurity     Worry: Not on file     Inability: Not on file     Transportation needs     Medical: Not on file     Non-medical: Not on file   Tobacco Use     Smoking status: Former Smoker     Packs/day: 0.20     Years: 40.00     Pack years: 8.00     Types: Cigarettes     Start date:      Last attempt to quit: 2008     Years since quittin.6     Smokeless tobacco: Never Used     Tobacco comment: occasional   Substance and Sexual Activity     Alcohol use: Not Currently     Alcohol/week: 35.0 standard drinks     Drug use: Not Currently     Sexual activity: Not on file   Lifestyle     Physical activity     Days per week: Not on file     Minutes per session: Not on file     Stress: Not on file   Relationships     Social connections     Talks on phone: Not on file     Gets together: Not on file     Attends Judaism service: Not on file     Active member of club or organization: Not on file     Attends meetings of clubs or organizations: Not on file     Relationship status: Not on file     Intimate partner violence     Fear of current or ex partner: Not on file     Emotionally abused: Not on file     Physically abused: Not on file     Forced sexual activity: Not on file   Other Topics Concern     Parent/sibling w/ CABG, MI or angioplasty before 65F 55M? Not Asked   Social History Narrative     Not on file           Allergies:   No known drug allergies      Ame Mejía PA-C  [facility]  Pager: [phone]

## 2020-09-08 ENCOUNTER — CARE COORDINATION (OUTPATIENT)
Dept: CARDIOLOGY | Facility: CLINIC | Age: 80
End: 2020-09-08

## 2020-09-08 DIAGNOSIS — J90 RECURRENT PLEURAL EFFUSION ON LEFT: ICD-10-CM

## 2020-09-08 NOTE — PROGRESS NOTES
[facility] - [facility] Telemanagement  My Health Tracker HF Alert     Weight today: 152#    Weight goal: 145-150#  MyHealth Tracker CHF Flowsheet My weight today is:   8/26/2020 149   8/27/2020 149   8/28/2020 148   8/29/2020 146   8/30/2020 146   8/31/2020 145   9/1/2020 144   9/2/2020 148   9/3/2020 146   9/4/2020 150   9/5/2020 152   9/6/2020 152   9/7/2020 151   9/8/2020 152     Heart Failure sx: none reported    Daily diuretic plan:   Lasix 40mg BID   KCL 20meq BID    Notes: Pt had visit with FORTUNATO Ortiz on 9/2, home weight that day was 148#, and pt doing well. Lasix was decreased to 40mg BID from 80mg in am and 40mg in pm. Pt to have in clinic visit in 2 weeks with labs prior, this has not been scheduled yet. Weight is up 4# since decrease in Lasix and 2# above max weight parameters. Weight stable above parameters last 4 days. Called pt, he reports doing well. He denies any swelling, reports legs remain much better. Denies SOB. Reviewed need for in clinic visit again next week with labs, told pt I will have scheduling call him to arrange. He stated understanding. Also told pt will send update to FORTUNATO Ortiz, call if develops increased swelling or SOB. He stated understanding.     Sent to scheduling to call pt for follow up and to FORTUNATO Ortiz to address parameters.     No future appointments.      BAYRON ChavarriaN, RN, CHFN  09/08/20 at 10:05 AM

## 2020-09-09 ENCOUNTER — TELEPHONE (OUTPATIENT)
Dept: CARDIOLOGY | Facility: CLINIC | Age: 80
End: 2020-09-09

## 2020-09-09 RX ORDER — FUROSEMIDE 40 MG
TABLET ORAL
Qty: 270 TABLET | Refills: 1 | COMMUNITY
Start: 2020-09-09 | End: 2020-09-22

## 2020-09-09 NOTE — PROGRESS NOTES
"[facility]  Called pt, reviewed instructions to increase Lasix to 60mg in am and 40mg in pm per FORTUNATO Ortiz. Pt stated understanding. Med list updated. Also asked pt for update on his BP's per staff message from FORTUNATO Otriz:     \"Hi team. Tc did not have labs drawn prior to his visit today so if you could help me keep an eye out for those results later today, I'd appreciate it! Also, I have asked Tc to record ambulatory BP's 1-2 hours post AM meds. Can someone touch base with him next week to obtain numbers? Thanks!\"    Pt reports BP's 1 hour after medications is consistently 130/70's. BP today was 134/78. Pt continues to feel well. Confirmed lab appt for tomorrow and follow up next week. Pt stated understanding.     FYI with BP update sent to FORTUNATO Ortiz.     Future Appointments   Date Time Provider Department Center   9/10/2020  1:50 PM [facility]   9/16/2020 10:10 AM Ame Mejía PA-C [facility]     BAYRON ChavarriaN, RN, CHFN  09/09/20 at 3:17 PM   "

## 2020-09-09 NOTE — TELEPHONE ENCOUNTER
Wellness Screening Tool    Symptom Screening:    Do you have one of the following NEW symptoms:      Fever (subjective or >100.0)?  No    New cough? No    Shortness of breath? No    Chills? No    New loss of taste or smell? No    Generalized body aches? No    New persistent headache? No    New sore throat? No    Nausea, vomiting or diarrhea? No    Within the past 2 weeks, have you been exposed to someone with a known positive illness below?      COVID - 19 (known or suspected) No    Chicken pox?  No    Measles? No    Pertussis? No    Have you had a positive COVID-19 diagnostic test (nasal swab test) in the last 14 days or are you currently   on self-quarantine restrictions (i.e.travel restriction, exposure, etc?) No        Patient notified of visitor restriction: Yes  Patient informed to wear a mask: Yes    Patient's appointment status: Patient will be seen in clinic as scheduled on 9/10/20

## 2020-09-10 DIAGNOSIS — I50.33 ACUTE ON CHRONIC DIASTOLIC HEART FAILURE (H): ICD-10-CM

## 2020-09-10 LAB
ANION GAP SERPL CALCULATED.3IONS-SCNC: 13.9 MMOL/L (ref 6–17)
BUN SERPL-MCNC: 16 MG/DL (ref 7–30)
CALCIUM SERPL-MCNC: 10.5 MG/DL (ref 8.5–10.5)
CHLORIDE SERPL-SCNC: 101 MMOL/L (ref 98–107)
CO2 SERPL-SCNC: 31 MMOL/L (ref 23–29)
CREAT SERPL-MCNC: 1.66 MG/DL (ref 0.7–1.3)
GFR SERPL CREATININE-BSD FRML MDRD: 40 ML/MIN/{1.73_M2}
GLUCOSE SERPL-MCNC: 264 MG/DL (ref 70–105)
NT-PROBNP SERPL-MCNC: 2611 PG/ML (ref 0–450)
POTASSIUM SERPL-SCNC: 3.9 MMOL/L (ref 3.5–5.1)
SODIUM SERPL-SCNC: 142 MMOL/L (ref 136–145)

## 2020-09-10 PROCEDURE — 83880 ASSAY OF NATRIURETIC PEPTIDE: CPT | Performed by: PHYSICIAN ASSISTANT

## 2020-09-10 PROCEDURE — 80048 BASIC METABOLIC PNL TOTAL CA: CPT | Performed by: PHYSICIAN ASSISTANT

## 2020-09-10 PROCEDURE — 36415 COLL VENOUS BLD VENIPUNCTURE: CPT | Performed by: PHYSICIAN ASSISTANT

## 2020-09-22 ENCOUNTER — VIRTUAL VISIT (OUTPATIENT)
Dept: CARDIOLOGY | Facility: CLINIC | Age: 80
End: 2020-09-22
Attending: PHYSICIAN ASSISTANT
Payer: COMMERCIAL

## 2020-09-22 ENCOUNTER — DOCUMENTATION ONLY (OUTPATIENT)
Dept: CARDIOLOGY | Facility: CLINIC | Age: 80
End: 2020-09-22

## 2020-09-22 DIAGNOSIS — I27.20 PULMONARY HYPERTENSION (H): ICD-10-CM

## 2020-09-22 DIAGNOSIS — I50.33 ACUTE ON CHRONIC HEART FAILURE WITH PRESERVED EJECTION FRACTION (HFPEF) (H): ICD-10-CM

## 2020-09-22 DIAGNOSIS — I48.20 CHRONIC ATRIAL FIBRILLATION (H): ICD-10-CM

## 2020-09-22 DIAGNOSIS — I48.92 ATRIAL FIBRILLATION/FLUTTER (H): ICD-10-CM

## 2020-09-22 DIAGNOSIS — I10 ESSENTIAL HYPERTENSION: Primary | ICD-10-CM

## 2020-09-22 DIAGNOSIS — I48.91 ATRIAL FIBRILLATION/FLUTTER (H): ICD-10-CM

## 2020-09-22 DIAGNOSIS — J90 RECURRENT PLEURAL EFFUSION ON LEFT: ICD-10-CM

## 2020-09-22 PROCEDURE — 99214 OFFICE O/P EST MOD 30 MIN: CPT | Mod: 95 | Performed by: PHYSICIAN ASSISTANT

## 2020-09-22 RX ORDER — FUROSEMIDE 80 MG
80 TABLET ORAL DAILY
Qty: 90 TABLET | Refills: 3 | Status: SHIPPED | OUTPATIENT
Start: 2020-09-22 | End: 2020-12-18

## 2020-09-22 RX ORDER — LOSARTAN POTASSIUM 25 MG/1
25 TABLET ORAL DAILY
Qty: 90 TABLET | Refills: 3 | Status: SHIPPED | OUTPATIENT
Start: 2020-09-22 | End: 2020-10-08 | Stop reason: DRUGHIGH

## 2020-09-22 NOTE — PROGRESS NOTES
"Tc Garcia is a 80 year old male who is being evaluated via a billable telephone visit.      The patient has been notified of following:     \"This telephone visit will be conducted via a call between you and your physician/provider. We have found that certain health care needs can be provided without the need for a physical exam.  This service lets us provide the care you need with a short phone conversation.  If a prescription is necessary we can send it directly to your pharmacy.  If lab work is needed we can place an order for that and you can then stop by our lab to have the test done at a later time.    Telephone visits are billed at different rates depending on your insurance coverage. During this emergency period, for some insurers they may be billed the same as an in-person visit.  Please reach out to your insurance provider with any questions.    If during the course of the call the physician/provider feels a telephone visit is not appropriate, you will not be charged for this service.\"    Patient has given verbal consent for Telephone visit?  Yes    What phone number would you like to be contacted at? [phone]    How would you like to obtain your AVS? Mail a copy    Vitals - Patient Reported  Systolic (Patient Reported): (!) 147  Diastolic (Patient Reported): (!) 90  Weight (Patient Reported): 67.6 kg (149 lb)  Height (Patient Reported): 175.3 cm (5' 9\")  BMI (Based on Pt Reported Ht/Wt): 22  Pulse (Patient Reported): 74    Review Of Systems  Skin: NEGATIVE  Eyes:Ears/Nose/Throat: NEGATIVE  Respiratory: NEGATIVE  Cardiovascular: NEGATIVE  Gastrointestinal: **c/o bowel problem  Genitourinary:NEGATIVE   Musculoskeletal: **back pain  Neurologic: NEGATIVE  Psychiatric: NEGATIVE  Hematologic/Lymphatic/Immunologic: NEGATIVE  Endocrine:  NEGATIVE    Reviewed by MOHSEN Schaefer, 9/22/20    Phone call duration: 15 minutes        Cardiology Clinic Progress Note    Tc Garcia MRN# [id number]   Date of Birth: " 1940 Age: 80 year old         History of Presenting Illness:      Tc Garcia is a pleasant 80 year old patient of Dr. Bonilla (PH) and Dr. Julien who presents today a [facility] clinic follow up visit. He has been followed closely in the [facility] clinic by Katlin Fontanez PA-C, and I am now following him as well in the [facility] clinic.     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 Baseline creat ~ 1.5.  # Obesity  # Social - Lives with wife, Radha. Sedentary. High sodium diet, medication noncompliance, some concern over cognitive function    Recent admissions:  - Beginning of May 2020 with progressive dyspnea and due to recurrent (transudative) pleural effusion a left PleurX catheter was placed. He was diuresed with IV furosemide and given empiric abx therapy. Echo showed normal LVEF 55-60% with normal wall motion. The RV was moderately dilated with decreased systolic function and mild TR. He underwent a RHC during that stay that showed mild PH (PA pressure of 28 mmHg (51/15), mean RA of 4mmHg, and mildly elevated filling pressures with a wedge of 12mmHg (Vwave 16). PVR was 3.9 Wood units and Carlos Manuel CO/CI was 4.09/2.23. With vasodilator challenge, his PVR normalized to 1.22 wood units. Therefore, he was deemed to have pulmonary hypertension out of proportion to his left heart disease. He was started on Cialis 5mg daily but because this was not a formulated  "preparation, was switched to sildenafil 20 mg 3 times daily and discharged home. Interestingly, he was not discharged on any diuretics. Discharge wt was 152 lbs. His admission was complicated by atrial flutter for which he was given amiodarone but because of prolongation in QTc, this was discontinued, and he was continued on his PTA verapamil.    Following discharge he developed symptomatic hypotension and via phone was told to stop the sildenafil. He then saw Dr. Chad gusman for PH evaluation a few days later. He reported hypertension with SBP >200mmHg and weight was up 12 lbs from discharge. Torsemide 10 mg daily, Losartan 25 mg daily, and KCL 40 mEq were all started. She recommended resuming sildenafil once he was felt to be euvolemic.       - Readmitted from 5/21 to 5/29 with uncontrolled diabetes and recurrent acute HFpEF, related to medication noncompliance. He again required IV lasix and was discharged on Lasix 40mg BID in place of the torsemide. Due to renal concerns, his hydrochlorothiazide and losartan were held. Fortunately, home health was arranged to help him manage his medications at home.     - 7/8-7/10 for re-accumulating pleural effusion, at which time IR evaluated his pleurex and was able to place to suction with 550ml fluid removed. He was also diuresed with good result.   - 7/10-7/18 for weakness/fall resulting in head contusion. He was found to have an empyema and received antibiotics. His pleurex was removed and a chest tube was placed. CT drained \"nearly all the fluid\" and was removed day prior to discharge.    Tc had a follow-up phone visit with Katlin on 8/11/20. His only complaint was of debilitating back pain for which he was seeing a spine specialist. He reported a stable weight from his most recent discharge, at 155 lbs. Labs showed a creatinine of 1.5, BUN 16, stable electrolytes, TSH slightly elevated but T4F WNL. She kept his Lasix the same at 40 mg BID and asked him to present to " "clinic for evaluation.     Last visit, on 8/19, I met Tc.  He told me his symptoms were stable - dyspneic with stair-climbing but otherwise does not notice it. His leg swelling was notable but reportedly stable (2+ to the knees, chronic venous stasis changes). His biggest complaint was of fatigue and ongoing back pain. His weight was up from 154 --> 161 lbs compared with his last clinic visit beginning of July. Similarly, his home weight had risen 5 lbs over the past two days after eating a large meal of pasta. I increased his Lasix to 80 qAM / 40 qPM, enrolled him in MHT and ordered a zio monitor for assessment of rate control in AF. This showed rate controlled persistent atrial fibrillation. I also enrolled in him pulmonary rehab.     After that, he quickly lost 10 lbs (to 144 lbs per his home scale) with significant symptomatic improvement, and I instructed him to return to his original Lasix dosing of 40 BID. However his weight quickly increased again and I increased his regimen to 60 qAM and 40 qPM.     Today, Tc tells me \"I'm doing fine!\" However he admits that he's been forgetting his PM dose of furosemide so he's been taking 80 mg daily in the morning alone. He states \"it's working fine!\" Some leg swelling he states, but it's minimal. He denies chest pain, PND, orthopnea, palpitations, near syncope or syncope. Generally gets poor sleep. No abdominal bloating, good appetite. However he describes frequent loose stools with little warning x 1 month. I suspect he is not following a low sodium diet. His biggest complaint is of ongoing back pain which is slowly improving. He had his initial meeting with pulmonary rehab a few weeks ago, but wants to wait to go back until he is less limited by his back. Pt reports BP's for the past two weeks has been in the 150's. Previously was in the 130's/70's. When we discussed his antihypertensive regimen, he realizes that he doesn't have his Losartan.  His most recent labs " show slight ABBIE with a creatinine of 1.66 and BUN of 16.     Recent MHT data:  Goal wt range: 145-150#  [employer] Tracker CHF Flowsheet My weight today is:   9/3/2020 146   9/4/2020 150   9/5/2020 152   9/6/2020 152   9/7/2020 151   9/8/2020 152   9/9/2020 151   9/10/2020 148   9/11/2020 148   9/12/2020 148   9/13/2020 148   9/14/2020 149   9/16/ 2020 148   9/17/2020 149   9/18/2020 148   9/19/2020 149   9/20/2020 148   9/21/2020 149        Assessment/Plan:  1. Acute on chronic HFpEF / cor pulmonale, with recurrent admissions (four in past four months) due to multiple comorbidities including pleural effusion, medication noncompliance, poorly controlled diabetes and anemia.               -- His most recent echo from May 2020 shows preserved EF 55-60%, with normal wall motion. RV was mild to moderately dilated with mildly decreased RV function. He had a RHC during his hospital stay in May as below. He has since had recurrent hospitalizations in the setting of intermittent volume overload but also a recurrent pleural effusion/empyema.    -- FC II on Lasix 80 daily (non-compliant with PM dose).   -- Enrolled in MHT with a goal range of 145-150 lbs. Currently stable 148-149 lbs.   -- Counseled on sodium intake.    -- Once we are back to implanting, he should be considered for a cardioMEMs.                 2. Pulmonary hypertension.              --RHC in May 2020 showed PA pressure of 28 mmHg (51/15), mean RA of 4mmHg, and mildly elevated filling pressures with a wedge of 12mmHg (Vwave 16). PVR was 3.9 Wood units and Carlos Manuel CO/CI was 4.09/2.23. With vasodilator challenge, his PVR normalized to 1.22 wood units. Therefore, he was deemed to have pulmonary hypertension out of proportion to his left heart disease. Wt at that time was 150 lbs.               --As per Dr. Bonilla's recommendations, once he is euvolemic, our plan is to potentially reintroduce sildenafil. However, thus far, he has not proven to be a good or stable  candidate for this.               -- Will return to pulmonary rehab once back pain is less debilitating.      3. Paroxysmal atrial fibrillation/flutter.              -- Recent zio monitor shows adequate rate control on verapamil.               --He is on anticoagulation with apixaban 2.5mg BID due to age and renal function.     4. Hypertension.              -- Recently uncontrolled, mistakenly off of Losartan. I suspect this is adversely affecting his renal function. Will re-start this at 25 mg daily now. BMP in two weeks with RN call for BP update then.    5. Anemia of chronic disease.   -- Most recent Hgb 10.6. Has received Fe infusions in the past with symptomatic improvement.     6. ABBIE - most recent creat 1.66, with BUN in-range. Creat over the past year has been variable, between 1.3 - 2.0. I suspect that we may have to allow his creatinine to run on the higher side in order to keep him feeling well.     Follow-up:  - 2 months with Dr. Julien + BMP, lipid panel.         Review of Systems:     12-pt ROS is negative except for as noted in the HPI.          Physical Exam:     Vitals: There were no vitals taken for this visit.  Wt Readings from Last 10 Encounters:   09/02/20 68.3 kg (150 lb 8 oz)   08/19/20 73 kg (161 lb)   08/11/20 70.3 kg (155 lb)   07/15/20 71.1 kg (156 lb 12 oz)   07/10/20 67.5 kg (148 lb 14.4 oz)   07/08/20 72.2 kg (159 lb 3.2 oz)   06/16/20 70.8 kg (156 lb)   06/05/20 67.5 kg (148 lb 12.8 oz)   05/29/20 69.4 kg (153 lb)   05/21/20 70.3 kg (155 lb)          Data:     Labs reviewed:  Recent Labs   Lab Test 09/10/20  1359 09/02/20  1044 08/10/20  1027 07/08/20  0944  05/13/20  0553 05/12/20  0541 05/11/20  0542 04/25/20  1228   LDL  --   --   --   --   --  43  --   --   --    HDL  --   --   --   --   --  60  --   --   --    NHDL  --   --   --   --   --  56  --   --   --    CHOL  --   --   --   --   --  116  --   --   --    TRIG  --   --   --   --   --  65  --   --   --    TSH  --   --  4.75*   --   --   --  3.47  --  4.39*   NTBNP 2,611* 2,024*  --  2,210*   < >  --   --   --   --    IRON  --   --   --   --   --   --   --  16*  --    FEB  --   --   --   --   --   --   --  161*  --    IRONSAT  --   --   --   --   --   --   --  10*  --    ERNESTO  --   --   --   --   --   --   --  142  --     < > = values in this interval not displayed.       Lab Results   Component Value Date    WBC 10.1 07/30/2020    RBC 3.28 (L) 07/30/2020    HGB 10.6 (L) 09/02/2020    HCT 29.2 (L) 07/30/2020    MCV 89 07/30/2020    MCH 26.5 07/30/2020    MCHC 29.8 (L) 07/30/2020    RDW 16.9 (H) 07/30/2020     (H) 07/30/2020       Lab Results   Component Value Date     09/10/2020    POTASSIUM 3.9 09/10/2020    CHLORIDE 101 09/10/2020    CO2 31 (H) 09/10/2020    ANIONGAP 13.9 09/10/2020     (H) 09/10/2020    BUN 16 09/10/2020    CR 1.66 (H) 09/10/2020    GFRESTIMATED 40 (L) 09/10/2020    GFRESTBLACK 48 (L) 09/10/2020    LLOYD 10.5 09/10/2020      Lab Results   Component Value Date    AST 13 08/10/2020    ALT 11 08/10/2020       Lab Results   Component Value Date    A1C 7.4 (H) 07/08/2020       Lab Results   Component Value Date    INR 1.18 (H) 07/17/2020    INR 1.49 (H) 05/10/2020           Problem List:     Patient Active Problem List   Diagnosis     DM type 2, Hgb A1C 8.2 on 4/25/20     Mixed hyperlipidemia     Essential hypertension     Pain in limb     Plantar fascial fibromatosis     HYPERLIPIDEMIA LDL GOAL <100     Hemothorax on left     Recurrent Left Pleural effusion -- S/P Pleurex Cath 5/12/20     ABBIE (acute kidney injury) (H)     Acute respiratory failure with hypoxia (H)     Pulmonary hypertension (H)     CRF (chronic renal failure), stage 3      Anemia of chronic disease     Atrial fibrillation/flutter -- on Eliquis and Amiodarone     DKA (diabetic ketoacidoses) (H)     Anemia in CKD (chronic kidney disease)     Dyspnea     SOB (shortness of breath)           Medications:     Current Outpatient Medications    Medication Sig Dispense Refill     acetaminophen (TYLENOL) 500 MG tablet Take 1,000 mg by mouth 3 times daily       albuterol (PROAIR HFA/PROVENTIL HFA/VENTOLIN HFA) 108 (90 Base) MCG/ACT inhaler Inhale 2 puffs into the lungs every 4 hours as needed for shortness of breath / dyspnea or wheezing Inhale 2 puffs every 4 to 6 hours as needed.       apixaban ANTICOAGULANT (ELIQUIS) 5 MG tablet Take 1/2 tablet (2.5mg) by mouth twice daily. You will have your labs checked on 5/4/20 and your PCP will direct you when it is safe to increase your dose back to 1 tablet (5mg) twice daily.       furosemide (LASIX) 40 MG tablet Take 60mg in the am and 40mg in the pm 270 tablet 1     glucagon 1 MG kit 1 mg as needed for low blood sugar       insulin aspart (NOVOLOG PEN) 100 UNIT/ML pen Inject 8 units subcutaneous with breakfast, 8 units subcutaneous with lunch and 4 units with supper. 3 mL 0     losartan (COZAAR) 25 MG tablet Take 25 mg by mouth daily       LOVASTATIN 20 MG PO TABS 1 TABLET DAILY AT DINNER 90 Tab 0     nystatin (MYCOSTATIN) 029057 UNIT/GM external cream Apply topically 2 times daily as needed        potassium chloride ER (KLOR-CON M) 10 MEQ CR tablet Take 2 tablets (20 mEq) by mouth 2 times daily 360 tablet 1     tiotropium (SPIRIVA) 18 MCG inhaled capsule Inhale 18 mcg into the lungs daily       insulin glargine (LANTUS PEN) 100 UNIT/ML pen Inject 14 Units Subcutaneous every morning       verapamil ER (VERELAN) 240 MG 24 hr capsule Take 1 capsule (240 mg) by mouth At Bedtime (Patient not taking: Reported on 9/22/2020)             Past Medical History:     Past Medical History:   Diagnosis Date     Anemia      Anemia of chronic disease 5/14/2020     CKD (chronic kidney disease) stage 3, GFR 30-59 ml/min (H)      CRF (chronic renal failure), stage 3  5/14/2020     Fall 11/2019    suffered multiple left rib fractures, C3 and T2 fractures     Mixed hyperlipidemia 7/7/2004     Paroxysmal atrial fibrillation (H)       Personal history of colonic polyps 1972    gets colonoscopy every five years, due in      Pleural effusion on left 2019    after multiple rib fractures     Pulmonary hypertension (H) 5/10/2020    Added automatically from request for surgery [id number]     Recurrent Left Pleural effusion -- S/P Pleurex Cath 2019     Rosacea      Type II or unspecified type diabetes mellitus without mention of complication, not stated as uncontrolled      Unspecified essential hypertension      Past Surgical History:   Procedure Laterality Date     ANESTHESIA CARDIOVERSION N/A 2/3/2020    Procedure: ANESTHESIA, FOR CARDIOVERSION;  Surgeon: GENERIC ANESTHESIA PROVIDER;  Location:  OR     CV RIGHT HEART CATH N/A 2020    Procedure: Right Heart Cath;  Surgeon: Senthil Silva MD;  Location:  HEART CARDIAC CATH LAB     HC REMOVE TONSILS/ADENOIDS,<11 Y/O      T & A <11y.o.     IR CHEST TUBE DRAIN TUNNELED LEFT  2020     IR CHEST TUBE PLACEMENT NON-TUNNELLED LEFT  2020     IR CHEST TUBE REMOVAL NON TUNNELED LEFT  2020     IR CHEST TUBE REMOVAL TUNNELED LEFT  2020     Family History   Problem Relation Age of Onset     Family History Negative Mother          age 71     Family History Negative Father          age 70     Diabetes Brother         alive age 77     Diabetes Brother         alive age 76     Family History Negative Brother              Family History Negative Brother              Diabetes Sister         alive age76     Family History Negative Sister          age 86     Heart Disease Sister              Family History Negative Sister              Family History Negative Sister              Diabetes Sister      Diabetes Sister      Diabetes Brother      Diabetes Brother      Social History     Socioeconomic History     Marital status:      Spouse name: Lianna     Number of children: 4     Years of  education: 16     Highest education level: Not on file   Occupational History     Occupation: NICORIER     Employer: [employer]    Social Needs     Financial resource strain: Not on file     Food insecurity     Worry: Not on file     Inability: Not on file     Transportation needs     Medical: Not on file     Non-medical: Not on file   Tobacco Use     Smoking status: Former Smoker     Packs/day: 0.20     Years: 40.00     Pack years: 8.00     Types: Cigarettes     Start date:      Last attempt to quit: 2008     Years since quittin.7     Smokeless tobacco: Never Used     Tobacco comment: occasional   Substance and Sexual Activity     Alcohol use: Not Currently     Alcohol/week: 35.0 standard drinks     Drug use: Not Currently     Sexual activity: Not on file   Lifestyle     Physical activity     Days per week: Not on file     Minutes per session: Not on file     Stress: Not on file   Relationships     Social connections     Talks on phone: Not on file     Gets together: Not on file     Attends Restorationism service: Not on file     Active member of club or organization: Not on file     Attends meetings of clubs or organizations: Not on file     Relationship status: Not on file     Intimate partner violence     Fear of current or ex partner: Not on file     Emotionally abused: Not on file     Physically abused: Not on file     Forced sexual activity: Not on file   Other Topics Concern     Parent/sibling w/ CABG, MI or angioplasty before 65F 55M? Not Asked   Social History Narrative     Not on file           Allergies:   No known drug allergies      Ame Mejía PA-C  [facility]  Pager: [phone]

## 2020-09-22 NOTE — LETTER
"9/22/2020    Charlie Finch MD  [address]  [facility] [address]    RE: Tc Garcia       Dear Colleague,    I had the pleasure of seeing Tc Garcia in the [facility].    Tc Garcia is a 80 year old male who is being evaluated via a billable telephone visit.      The patient has been notified of following:     \"This telephone visit will be conducted via a call between you and your physician/provider. We have found that certain health care needs can be provided without the need for a physical exam.  This service lets us provide the care you need with a short phone conversation.  If a prescription is necessary we can send it directly to your pharmacy.  If lab work is needed we can place an order for that and you can then stop by our lab to have the test done at a later time.    Telephone visits are billed at different rates depending on your insurance coverage. During this emergency period, for some insurers they may be billed the same as an in-person visit.  Please reach out to your insurance provider with any questions.    If during the course of the call the physician/provider feels a telephone visit is not appropriate, you will not be charged for this service.\"    Patient has given verbal consent for Telephone visit?  Yes    What phone number would you like to be contacted at? [phone]    How would you like to obtain your AVS? Mail a copy    Vitals - Patient Reported  Systolic (Patient Reported): (!) 147  Diastolic (Patient Reported): (!) 90  Weight (Patient Reported): 67.6 kg (149 lb)  Height (Patient Reported): 175.3 cm (5' 9\")  BMI (Based on Pt Reported Ht/Wt): 22  Pulse (Patient Reported): 74    Review Of Systems  Skin: NEGATIVE  Eyes:Ears/Nose/Throat: NEGATIVE  Respiratory: NEGATIVE  Cardiovascular: NEGATIVE  Gastrointestinal: **c/o bowel problem  Genitourinary:NEGATIVE   Musculoskeletal: **back pain  Neurologic: NEGATIVE  Psychiatric: " NEGATIVE  Hematologic/Lymphatic/Immunologic: NEGATIVE  Endocrine:  NEGATIVE    Reviewed by MOHSEN Schaefer, 9/22/20    Phone call duration: 15 minutes      Cardiology Clinic Progress Note    Tc Garcia MRN# [id number]   YOB: 1940 Age: 80 year old         History of Presenting Illness:      Tc Garcia is a pleasant 80 year old patient of Dr. Bonilla () and Dr. Julien who presents today a CORE clinic follow up visit. He has been followed closely in the CORE clinic by Katlin Fontanez PA-C, and I am now following him as well in the CORE clinic.     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 Baseline creat ~ 1.5.  # Obesity  # Social - Lives with wife, Radha. Sedentary. High sodium diet, medication noncompliance, some concern over cognitive function    Recent admissions:  - Beginning of May 2020 with progressive dyspnea and due to recurrent (transudative) pleural effusion a left PleurX catheter was placed. He was diuresed with IV furosemide and given empiric abx therapy. Echo showed normal LVEF 55-60% with normal wall motion. The RV was moderately dilated with decreased systolic function and mild TR. He underwent a RHC during that stay that showed mild PH (PA pressure of 28 mmHg (51/15), mean RA of 4mmHg, and mildly elevated filling pressures with a wedge of 12mmHg (Vwave 16). PVR was 3.9 Wood units and Carlos Manuel CO/CI was 4.09/2.23. With  "vasodilator challenge, his PVR normalized to 1.22 wood units. Therefore, he was deemed to have pulmonary hypertension out of proportion to his left heart disease. He was started on Cialis 5mg daily but because this was not a formulated preparation, was switched to sildenafil 20 mg 3 times daily and discharged home. Interestingly, he was not discharged on any diuretics. Discharge wt was 152 lbs. His admission was complicated by atrial flutter for which he was given amiodarone but because of prolongation in QTc, this was discontinued, and he was continued on his PTA verapamil.    Following discharge he developed symptomatic hypotension and via phone was told to stop the sildenafil. He then saw Dr. Chad gusman for PH evaluation a few days later. He reported hypertension with SBP >200mmHg and weight was up 12 lbs from discharge. Torsemide 10 mg daily, Losartan 25 mg daily, and KCL 40 mEq were all started. She recommended resuming sildenafil once he was felt to be euvolemic.       - Readmitted from 5/21 to 5/29 with uncontrolled diabetes and recurrent acute HFpEF, related to medication noncompliance. He again required IV lasix and was discharged on Lasix 40mg BID in place of the torsemide. Due to renal concerns, his hydrochlorothiazide and losartan were held. Fortunately, home health was arranged to help him manage his medications at home.     - 7/8-7/10 for re-accumulating pleural effusion, at which time IR evaluated his pleurex and was able to place to suction with 550ml fluid removed. He was also diuresed with good result.   - 7/10-7/18 for weakness/fall resulting in head contusion. He was found to have an empyema and received antibiotics. His pleurex was removed and a chest tube was placed. CT drained \"nearly all the fluid\" and was removed day prior to discharge.    Tc had a follow-up phone visit with Katlin on 8/11/20. His only complaint was of debilitating back pain for which he was seeing a spine specialist. " "He reported a stable weight from his most recent discharge, at 155 lbs. Labs showed a creatinine of 1.5, BUN 16, stable electrolytes, TSH slightly elevated but T4F WNL. She kept his Lasix the same at 40 mg BID and asked him to present to clinic for evaluation.     Last visit, on 8/19, I met Tc.  He told me his symptoms were stable - dyspneic with stair-climbing but otherwise does not notice it. His leg swelling was notable but reportedly stable (2+ to the knees, chronic venous stasis changes). His biggest complaint was of fatigue and ongoing back pain. His weight was up from 154 --> 161 lbs compared with his last clinic visit beginning of July. Similarly, his home weight had risen 5 lbs over the past two days after eating a large meal of pasta. I increased his Lasix to 80 qAM / 40 qPM, enrolled him in MHT and ordered a zio monitor for assessment of rate control in AF. This showed rate controlled persistent atrial fibrillation. I also enrolled in him pulmonary rehab.     After that, he quickly lost 10 lbs (to 144 lbs per his home scale) with significant symptomatic improvement, and I instructed him to return to his original Lasix dosing of 40 BID. However his weight quickly increased again and I increased his regimen to 60 qAM and 40 qPM.     Today, Tc tells me \"I'm doing fine!\" However he admits that he's been forgetting his PM dose of furosemide so he's been taking 80 mg daily in the morning alone. He states \"it's working fine!\" Some leg swelling he states, but it's minimal. He denies chest pain, PND, orthopnea, palpitations, near syncope or syncope. Generally gets poor sleep. No abdominal bloating, good appetite. However he describes frequent loose stools with little warning x 1 month. I suspect he is not following a low sodium diet. His biggest complaint is of ongoing back pain which is slowly improving. He had his initial meeting with pulmonary rehab a few weeks ago, but wants to wait to go back until he is " less limited by his back. Pt reports BP's for the past two weeks has been in the 150's. Previously was in the 130's/70's. When we discussed his antihypertensive regimen, he realizes that he doesn't have his Losartan.  His most recent labs show slight ABBIE with a creatinine of 1.66 and BUN of 16.     Recent MHT data:  Goal wt range: 145-150#  MyHealth Tracker CHF Flowsheet My weight today is:   9/3/2020 146   9/4/2020 150   9/5/2020 152   9/6/2020 152   9/7/2020 151   9/8/2020 152   9/9/2020 151   9/10/2020 148   9/11/2020 148   9/12/2020 148   9/13/2020 148   9/14/2020 149   9/16/ 2020 148   9/17/2020 149   9/18/2020 148   9/19/2020 149   9/20/2020 148   9/21/2020 149        Assessment/Plan:  1. Acute on chronic HFpEF / cor pulmonale, with recurrent admissions (four in past four months) due to multiple comorbidities including pleural effusion, medication noncompliance, poorly controlled diabetes and anemia.               -- His most recent echo from May 2020 shows preserved EF 55-60%, with normal wall motion. RV was mild to moderately dilated with mildly decreased RV function. He had a RHC during his hospital stay in May as below. He has since had recurrent hospitalizations in the setting of intermittent volume overload but also a recurrent pleural effusion/empyema.    -- FC II on Lasix 80 daily (non-compliant with PM dose).   -- Enrolled in MHT with a goal range of 145-150 lbs. Currently stable 148-149 lbs.   -- Counseled on sodium intake.    -- Once we are back to implanting, he should be considered for a cardioMEMs.                 2. Pulmonary hypertension.              --RHC in May 2020 showed PA pressure of 28 mmHg (51/15), mean RA of 4mmHg, and mildly elevated filling pressures with a wedge of 12mmHg (Vwave 16). PVR was 3.9 Wood units and Carlos Manuel CO/CI was 4.09/2.23. With vasodilator challenge, his PVR normalized to 1.22 wood units. Therefore, he was deemed to have pulmonary hypertension out of proportion to his  left heart disease. Wt at that time was 150 lbs.               --As per Dr. Bonilla's recommendations, once he is euvolemic, our plan is to potentially reintroduce sildenafil. However, thus far, he has not proven to be a good or stable candidate for this.               -- Will return to pulmonary rehab once back pain is less debilitating.      3. Paroxysmal atrial fibrillation/flutter.              -- Recent zio monitor shows adequate rate control on verapamil.               --He is on anticoagulation with apixaban 2.5mg BID due to age and renal function.     4. Hypertension.              -- Recently uncontrolled, mistakenly off of Losartan. I suspect this is adversely affecting his renal function. Will re-start this at 25 mg daily now. BMP in two weeks with RN call for BP update then.    5. Anemia of chronic disease.   -- Most recent Hgb 10.6. Has received Fe infusions in the past with symptomatic improvement.     6. ABBIE - most recent creat 1.66, with BUN in-range. Creat over the past year has been variable, between 1.3 - 2.0. I suspect that we may have to allow his creatinine to run on the higher side in order to keep him feeling well.     Follow-up:  - 2 months with Dr. Julien + BMP, lipid panel.         Review of Systems:     12-pt ROS is negative except for as noted in the HPI.          Physical Exam:     Vitals: There were no vitals taken for this visit.  Wt Readings from Last 10 Encounters:   09/02/20 68.3 kg (150 lb 8 oz)   08/19/20 73 kg (161 lb)   08/11/20 70.3 kg (155 lb)   07/15/20 71.1 kg (156 lb 12 oz)   07/10/20 67.5 kg (148 lb 14.4 oz)   07/08/20 72.2 kg (159 lb 3.2 oz)   06/16/20 70.8 kg (156 lb)   06/05/20 67.5 kg (148 lb 12.8 oz)   05/29/20 69.4 kg (153 lb)   05/21/20 70.3 kg (155 lb)          Data:     Labs reviewed:  Recent Labs   Lab Test 09/10/20  1359 09/02/20  1044 08/10/20  1027 07/08/20  0944  05/13/20  0553 05/12/20  0541 05/11/20  0542 04/25/20  1228   LDL  --   --   --   --   --  43  --    --   --    HDL  --   --   --   --   --  60  --   --   --    NHDL  --   --   --   --   --  56  --   --   --    CHOL  --   --   --   --   --  116  --   --   --    TRIG  --   --   --   --   --  65  --   --   --    TSH  --   --  4.75*  --   --   --  3.47  --  4.39*   NTBNP 2,611* 2,024*  --  2,210*   < >  --   --   --   --    IRON  --   --   --   --   --   --   --  16*  --    FEB  --   --   --   --   --   --   --  161*  --    IRONSAT  --   --   --   --   --   --   --  10*  --    ERNESTO  --   --   --   --   --   --   --  142  --     < > = values in this interval not displayed.       Lab Results   Component Value Date    WBC 10.1 07/30/2020    RBC 3.28 (L) 07/30/2020    HGB 10.6 (L) 09/02/2020    HCT 29.2 (L) 07/30/2020    MCV 89 07/30/2020    MCH 26.5 07/30/2020    MCHC 29.8 (L) 07/30/2020    RDW 16.9 (H) 07/30/2020     (H) 07/30/2020       Lab Results   Component Value Date     09/10/2020    POTASSIUM 3.9 09/10/2020    CHLORIDE 101 09/10/2020    CO2 31 (H) 09/10/2020    ANIONGAP 13.9 09/10/2020     (H) 09/10/2020    BUN 16 09/10/2020    CR 1.66 (H) 09/10/2020    GFRESTIMATED 40 (L) 09/10/2020    GFRESTBLACK 48 (L) 09/10/2020    LLOYD 10.5 09/10/2020      Lab Results   Component Value Date    AST 13 08/10/2020    ALT 11 08/10/2020       Lab Results   Component Value Date    A1C 7.4 (H) 07/08/2020       Lab Results   Component Value Date    INR 1.18 (H) 07/17/2020    INR 1.49 (H) 05/10/2020           Problem List:     Patient Active Problem List   Diagnosis     DM type 2, Hgb A1C 8.2 on 4/25/20     Mixed hyperlipidemia     Essential hypertension     Pain in limb     Plantar fascial fibromatosis     HYPERLIPIDEMIA LDL GOAL <100     Hemothorax on left     Recurrent Left Pleural effusion -- S/P Pleurex Cath 5/12/20     ABBIE (acute kidney injury) (H)     Acute respiratory failure with hypoxia (H)     Pulmonary hypertension (H)     CRF (chronic renal failure), stage 3      Anemia of chronic disease     Atrial  fibrillation/flutter -- on Eliquis and Amiodarone     DKA (diabetic ketoacidoses) (H)     Anemia in CKD (chronic kidney disease)     Dyspnea     SOB (shortness of breath)           Medications:     Current Outpatient Medications   Medication Sig Dispense Refill     acetaminophen (TYLENOL) 500 MG tablet Take 1,000 mg by mouth 3 times daily       albuterol (PROAIR HFA/PROVENTIL HFA/VENTOLIN HFA) 108 (90 Base) MCG/ACT inhaler Inhale 2 puffs into the lungs every 4 hours as needed for shortness of breath / dyspnea or wheezing Inhale 2 puffs every 4 to 6 hours as needed.       apixaban ANTICOAGULANT (ELIQUIS) 5 MG tablet Take 1/2 tablet (2.5mg) by mouth twice daily. You will have your labs checked on 5/4/20 and your PCP will direct you when it is safe to increase your dose back to 1 tablet (5mg) twice daily.       furosemide (LASIX) 40 MG tablet Take 60mg in the am and 40mg in the pm 270 tablet 1     glucagon 1 MG kit 1 mg as needed for low blood sugar       insulin aspart (NOVOLOG PEN) 100 UNIT/ML pen Inject 8 units subcutaneous with breakfast, 8 units subcutaneous with lunch and 4 units with supper. 3 mL 0     losartan (COZAAR) 25 MG tablet Take 25 mg by mouth daily       LOVASTATIN 20 MG PO TABS 1 TABLET DAILY AT DINNER 90 Tab 0     nystatin (MYCOSTATIN) 121448 UNIT/GM external cream Apply topically 2 times daily as needed        potassium chloride ER (KLOR-CON M) 10 MEQ CR tablet Take 2 tablets (20 mEq) by mouth 2 times daily 360 tablet 1     tiotropium (SPIRIVA) 18 MCG inhaled capsule Inhale 18 mcg into the lungs daily       insulin glargine (LANTUS PEN) 100 UNIT/ML pen Inject 14 Units Subcutaneous every morning       verapamil ER (VERELAN) 240 MG 24 hr capsule Take 1 capsule (240 mg) by mouth At Bedtime (Patient not taking: Reported on 9/22/2020)             Past Medical History:     Past Medical History:   Diagnosis Date     Anemia      Anemia of chronic disease 5/14/2020     CKD (chronic kidney disease) stage 3,  GFR 30-59 ml/min (H)      CRF (chronic renal failure), stage 3  2020     Fall 2019    suffered multiple left rib fractures, C3 and T2 fractures     Mixed hyperlipidemia 2004     Paroxysmal atrial fibrillation (H)      Personal history of colonic polyps 1972    gets colonoscopy every five years, due in      Pleural effusion on left 2019    after multiple rib fractures     Pulmonary hypertension (H) 5/10/2020    Added automatically from request for surgery [id number]     Recurrent Left Pleural effusion -- S/P Pleurex Cath 2019     Rosacea      Type II or unspecified type diabetes mellitus without mention of complication, not stated as uncontrolled      Unspecified essential hypertension      Past Surgical History:   Procedure Laterality Date     ANESTHESIA CARDIOVERSION N/A 2/3/2020    Procedure: ANESTHESIA, FOR CARDIOVERSION;  Surgeon: GENERIC ANESTHESIA PROVIDER;  Location:  OR      RIGHT HEART CATH N/A 2020    Procedure: Right Heart Cath;  Surgeon: Senthil Silva MD;  Location:  HEART CARDIAC CATH LAB     HC REMOVE TONSILS/ADENOIDS,<11 Y/O      T & A <11y.o.     IR CHEST TUBE DRAIN TUNNELED LEFT  2020     IR CHEST TUBE PLACEMENT NON-TUNNELLED LEFT  2020     IR CHEST TUBE REMOVAL NON TUNNELED LEFT  2020     IR CHEST TUBE REMOVAL TUNNELED LEFT  2020     Family History   Problem Relation Age of Onset     Family History Negative Mother          age 71     Family History Negative Father          age 70     Diabetes Brother         alive age 77     Diabetes Brother         alive age 76     Family History Negative Brother              Family History Negative Brother              Diabetes Sister         alive age76     Family History Negative Sister          age 86     Heart Disease Sister              Family History Negative Sister              Family History Negative Sister               Diabetes Sister      Diabetes Sister      Diabetes Brother      Diabetes Brother      Social History     Socioeconomic History     Marital status:      Spouse name: Lianna     Number of children: 4     Years of education: 16     Highest education level: Not on file   Occupational History     Occupation: NANCI     Employer: [employer]    Social Needs     Financial resource strain: Not on file     Food insecurity     Worry: Not on file     Inability: Not on file     Transportation needs     Medical: Not on file     Non-medical: Not on file   Tobacco Use     Smoking status: Former Smoker     Packs/day: 0.20     Years: 40.00     Pack years: 8.00     Types: Cigarettes     Start date:      Last attempt to quit: 2008     Years since quittin.7     Smokeless tobacco: Never Used     Tobacco comment: occasional   Substance and Sexual Activity     Alcohol use: Not Currently     Alcohol/week: 35.0 standard drinks     Drug use: Not Currently     Sexual activity: Not on file   Lifestyle     Physical activity     Days per week: Not on file     Minutes per session: Not on file     Stress: Not on file   Relationships     Social connections     Talks on phone: Not on file     Gets together: Not on file     Attends Confucianism service: Not on file     Active member of club or organization: Not on file     Attends meetings of clubs or organizations: Not on file     Relationship status: Not on file     Intimate partner violence     Fear of current or ex partner: Not on file     Emotionally abused: Not on file     Physically abused: Not on file     Forced sexual activity: Not on file   Other Topics Concern     Parent/sibling w/ CABG, MI or angioplasty before 65F 55M? Not Asked   Social History Narrative     Not on file           Allergies:   No known drug allergies      KAMILLA Espino [facility]  Pager: [phone]      Thank you for allowing me to participate  in the care of your patient.    Sincerely,     Ame Mejía PA-C     [facility]

## 2020-09-22 NOTE — PATIENT INSTRUCTIONS
Today, we discussed the following:   - Medication changes:  Re-start Losartan 25 mg daily.  - Follow up: Blood draw in two weeks. My nurse will call you after that.    Dr. Julien in 2 months.    Please, remember to continue the followin.  Weigh yourself daily. Call if your weight is up > than 2 pounds in one day, or 5 pounds in one week; if you feel more short of breath or have worsening swelling in your legs or abdomen.  2.  Eat a low sodium diet (less than 2,000mg or 2g daily). If you eat less salt, you will retain less fluid.   3.  Avoid alcohol, as this can worsen heart failure.   4.  Avoid NSAIDs as able (For example, Ibuprofen / aleve / advil / naprosen / diclofenac).    Please call CORE nurse for any questions or concerns Mon-Fri 8am-4pm:   [phone]  For concerns after hours:   [phone] option 2   Scheduling phone number:   [phone]    Thank you for visiting with us today.   Ame Mejía PA-C  ______________________________________________________________

## 2020-09-22 NOTE — PROGRESS NOTES
[facility]  MyHealth Tracker HF     Telemanagement for review at visit today with Ame LEW. Goal wt parameters and PRN diuretic plan to be addressed at today's visit.     Goal wt range: 145-150#  Recent telemanagement data:   MyHealth Tracker CHF Flowsheet My weight today is:   9/3/2020 146   9/4/2020 150   9/5/2020 152   9/6/2020 152   9/7/2020 151   9/8/2020 152   9/9/2020 151   9/10/2020 148   9/11/2020 148   9/12/2020 148   9/13/2020 148   9/14/2020 149   9/16/ 2020 148   9/17/2020 149   9/18/2020 148   9/19/2020 149   9/20/2020 148   9/21/2020 149      Debi Mills RN BSN   7:33 AM 09/22/20

## 2020-09-23 ENCOUNTER — CARE COORDINATION (OUTPATIENT)
Dept: CARDIOLOGY | Facility: CLINIC | Age: 80
End: 2020-09-23

## 2020-09-23 NOTE — PROGRESS NOTES
[facility] - [facility] Clinic    Lab order(BMP) faxed to pts PCP - [facility]. Note sent to board to watch for results and to call pt in ~2 weeks for update/BP results.  Argelia Bacon RN on 9/23/2020 at 9:17 AM

## 2020-09-28 ENCOUNTER — CARE COORDINATION (OUTPATIENT)
Dept: CARDIOLOGY | Facility: CLINIC | Age: 80
End: 2020-09-28

## 2020-09-28 NOTE — PROGRESS NOTES
[facility]    Pt last submitted HF MyHealth Tracker survey 9/25. Will contact pt 9/29 if no addnl surveys received.    Debi Mills RN BSN   3:25 PM 09/28/20      Have not received [facility] surveys.    Called Mr. Garcia and left friendly reminder to resume daily surveys, and/or call C[facility] RN if any questions/concerns.    No future appointments.    Debi Mills RN BSN   12:27 PM 09/29/20

## 2020-10-01 ENCOUNTER — TELEPHONE (OUTPATIENT)
Dept: SURGERY | Facility: PHYSICIAN GROUP | Age: 80
End: 2020-10-01

## 2020-10-01 ENCOUNTER — OFFICE VISIT (OUTPATIENT)
Dept: SURGERY | Facility: CLINIC | Age: 80
End: 2020-10-01
Payer: COMMERCIAL

## 2020-10-01 VITALS
RESPIRATION RATE: 16 BRPM | HEART RATE: 79 BPM | DIASTOLIC BLOOD PRESSURE: 78 MMHG | BODY MASS INDEX: 22.22 KG/M2 | WEIGHT: 150 LBS | SYSTOLIC BLOOD PRESSURE: 122 MMHG | HEIGHT: 69 IN | OXYGEN SATURATION: 100 %

## 2020-10-01 DIAGNOSIS — K40.20 NON-RECURRENT BILATERAL INGUINAL HERNIA WITHOUT OBSTRUCTION OR GANGRENE: Primary | ICD-10-CM

## 2020-10-01 PROCEDURE — 99214 OFFICE O/P EST MOD 30 MIN: CPT | Performed by: SURGERY

## 2020-10-01 ASSESSMENT — MIFFLIN-ST. JEOR: SCORE: 1375.78

## 2020-10-01 NOTE — PROGRESS NOTES
[facility]  Surgery Consultation    CONSULTATION REQUESTED BY:  Charlie Finch [phone]    HPI: Patient is an 80-year-old gentleman referred by the above-mentioned provider for consultation regarding left inguinal hernia.  Patient reports that he noted a bulge within his left groin area approximately 2 to 3 weeks ago.  He feels it is gotten somewhat larger in the short interval since.  He has no real pain associated with this.  No GI or bowel dysfunction.  No signs or symptoms to suggest incarceration or strangulation.  He is still struggling with some back pain stating that he threw his back out approximately 6 weeks ago.  This is markedly improved but still somewhat lingering.    PMH:   has a past medical history of Anemia, Anemia of chronic disease (5/14/2020), CKD (chronic kidney disease) stage 3, GFR 30-59 ml/min, CRF (chronic renal failure), stage 3  (5/14/2020), Fall (11/2019), Mixed hyperlipidemia (7/7/2004), Paroxysmal atrial fibrillation (H), Personal history of colonic polyps (1972), Pleural effusion on left (11/2019), Pulmonary hypertension (H) (5/10/2020), Recurrent Left Pleural effusion -- S/P Pleurex Cath 5/12/20 (12/30/2019), Rosacea (1989), Type II or unspecified type diabetes mellitus without mention of complication, not stated as uncontrolled (1999), and Unspecified essential hypertension (1994).  PSH:    has a past surgical history that includes REMOVE TONSILS/ADENOIDS,<13 Y/O; Anesthesia cardioversion (N/A, 2/3/2020); IR Chest Tube Drain Tunneled Left (5/12/2020); Right Heart Cath (N/A, 5/13/2020); IR Chest Tube Removal Tunneled Left (7/11/2020); IR Chest Tube Place Non Tunneled Left (7/11/2020); and IR Chest Tube Removal Non Tunneled Left (7/17/2020).  Social History:   reports that he quit smoking about 12 years ago. His smoking use included cigarettes. He started smoking about 52 years ago. He has a 8.00 pack-year smoking history. He has never used smokeless  "tobacco. He reports previous alcohol use of about 35.0 standard drinks of alcohol per week. He reports previous drug use.  Family History:  family history includes Diabetes in his brother, brother, brother, brother, sister, sister, and sister; Family History Negative in his brother, brother, father, mother, sister, sister, and sister; Heart Disease in his sister.  Medications/Allergies: Home medications and allergies reviewed.    ROS:  The 10 point Review of Systems is negative other than noted in the HPI.    Physical Exam:  /78 (BP Location: Left arm, Patient Position: Sitting, Cuff Size: Adult Regular)   Pulse 79   Resp 16   Ht 1.753 m (5' 9\")   Wt 68 kg (150 lb)   SpO2 100%   BMI 22.15 kg/m    GENERAL: Generally appears well.  Psych: Alert and Oriented.  Normal affect  Eyes: Sclera clear  Respiratory:  Lungs clear to ausculation bilaterally with good air excursion  Cardiovascular:  Regular Rate and Rhythm with no murmurs gallops or rubs, normal peripheral pulses  GI: Abdomen Non Distended Non-Tender  No hernias palpated..  Groin- I examined the patient in both the standing and supine positions. Right Groin- Small inguinal hernia.  Hernia was easily reduciable. Left Groin- Moderate sized inguinal hernia.  Hernia was easily reduciable. No scrotal or testicle abnormalities.  Lymphatic/Hematologic/Immune:  No femoral or cervical lymphadenopathy.  Integumentary:  No rashes  Neurological: grossly intact     All new lab and imaging data was reviewed.     Impression and Plan:  Patient is a 80 year old male with bilateral inguinal hernia    PLAN: Recommend outpatient laparoscopic repair with mesh.  I discussed the pathophysiology of hernias and options for repair including laparoscopic VS open.  The risks associated with the procedure including, but not limited to, recurrence, nerve entrapment or injury, persistence of pain, injury to the bowel/bladder, infertility, hematoma, mesh migration, mesh infection, " MI, and PE were discussed with the patient. He indicated understanding of the discussion, asked appropriate questions, and provided consent. Signs and symptoms of incarceration were discussed. If these develop in the interim, he promises to call or go straight to the ER. I have provided the patient with an information pamphlet.    Thank you very much for this consult.    Brian Garsia M.D.  [facility]  [phone]    Please route or send letter to:  Primary Care Provider (PCP) and Referring Provider

## 2020-10-05 ENCOUNTER — TELEPHONE (OUTPATIENT)
Dept: SURGERY | Facility: CLINIC | Age: 80
End: 2020-10-05

## 2020-10-05 DIAGNOSIS — Z11.59 ENCOUNTER FOR SCREENING FOR OTHER VIRAL DISEASES: Primary | ICD-10-CM

## 2020-10-05 NOTE — TELEPHONE ENCOUNTER
Type of surgery: Laparoscopic bilateral inguinal hernia repair  Location of surgery: [facility]  Date and time of surgery: 10/27/20 at 1pm  Surgeon: Dr. Brian Garsia  Pre-Op Appt Date: Patient to schedule  Post-Op Appt Date: Patient to schedule   Packet sent out: Yes  Pre-cert/Authorization completed:  Not Applicable  Date: 10/5/20

## 2020-10-08 ENCOUNTER — CARE COORDINATION (OUTPATIENT)
Dept: CARDIOLOGY | Facility: CLINIC | Age: 80
End: 2020-10-08

## 2020-10-08 DIAGNOSIS — I50.33 ACUTE ON CHRONIC HEART FAILURE WITH PRESERVED EJECTION FRACTION (HFPEF) (H): Primary | ICD-10-CM

## 2020-10-08 RX ORDER — LOSARTAN POTASSIUM 50 MG/1
50 TABLET ORAL DAILY
Qty: 90 TABLET | Refills: 3 | Status: SHIPPED | OUTPATIENT
Start: 2020-10-08 | End: 2020-12-09

## 2020-10-08 NOTE — PROGRESS NOTES
"[facility]    Called patient to assess response to re-start of losartan 25mg/d. Patients last virtual visit w/Ame Mejía on 9/22. At that time patients BP poorly controlled and it was discovered that patient mistakenly not taking losartan. As a result it was restarted at 25mg/d. Patient today confirmed that he is taking this daily. His BP typically runs 140's/mid 80's, \"sometimes it is 122/70\".  He was not able to give me any specific numbers as \"I don't write is down.\"    Patient further stated that he continues to feel well without SOB. In addition he stated that his legs \"are way down.\"  Per MHT his weights are stable 144 - 146#.    I asked patient if he has gotten the BMP as requested. He stated he tried, but his PCP has not gotten the order. I faxed this order on 9/22. I then called his PCP and they confirmed receipt of the order on 9/23. Call back to patient confirming they have the order, he stated he will call today to get lab scheduled. Reminder sent to board to watch for results. Will forward BP readings to Ame Mejía for review. Patient verbalized understanding and had no additional questions.  Argelia Bacon RN on 10/8/2020 at 10:45 AM      "

## 2020-10-08 NOTE — PROGRESS NOTES
[facility]    Called patient w/instructions per   Ame Mejía for increased Losartan. Med list updated and updated Rx sent to patients pharmacy. He will have a re-check BMP in 1-2 weeks at his PCP. Will have CORE RN fax order when next in clinic - reminder sent to board to fax order and watch for results. Finally I reminded patient to record BP readings stating that seeing the variability from day to day helps us better regulate his meds. Patient verbalized understanding and had no additional questions.  Argelia Bacon RN on 10/8/2020 at 3:40 PM

## 2020-10-08 NOTE — PROGRESS NOTES
Please increase Losartan to 50 mg daily. Can get BMP in 1-2 weeks. Ask him to record ambulatory BP's in the meantime. Thanks!

## 2020-10-09 ENCOUNTER — CARE COORDINATION (OUTPATIENT)
Dept: CARDIOLOGY | Facility: CLINIC | Age: 80
End: 2020-10-09

## 2020-10-09 NOTE — PROGRESS NOTES
[facility] Telemanagement  My Health Tracker HF Alert     Weight today: 142#   Weight goal: 145-150#  MyHealth Tracker CHF Flowsheet My weight today is:   9/16/2020 148   9/17/2020 149   9/18/2020 148   9/19/2020 149   9/20/2020 148   9/21/2020 149   9/22/2020 149   9/23/2020 150   9/25/2020 151   10/1/2020 149   10/2/2020 147   10/3/2020 147   10/5/2020 146   10/6/2020 144   10/7/2020 144   10/8/2020 146   10/9/2020 142     Heart Failure sx: none    Daily diuretic plan: Lasix 80mg daily, KCL 20meq BID    Notes: Wt down 4# from yesterday and now below goal. Called pt, he reports feeling well without concerns. He has no idea why weight dropped today. Pt reports he started checking BP today and writing it down as instructed. /78 this am, before medications. As pt not having symptoms, will continue to monitor. If weight continues to drop or remains low on Monday, will review with FORTUNATO Ortiz.     Future Appointments   Date Time Provider Department Center   10/23/2020  1:30 PM BX COVID LAB BXLAB BLCX   10/27/2020 12:30 PM Brian Garsia MD SXSUR SXSX       BAYRON ChavarriaN, RN, CHFN  10/09/20 at 12:25 PM

## 2020-10-12 NOTE — PROGRESS NOTES
"[facility]    MHT update: Wt stable at 142# all weekend, today reporting new SOB.    Called pt to discuss. States he was somewhat SOB this morning but as he started moving around it completely resolved. No other HF sx, feels \"great\". He would like to cont to monitor.     Advised pt this is reasonable. Since wt 3# below goal range of 145-150# will send update to Ame Mejía PA-C. Encouraged to call if further concerns today otherwise will watch trend this week.     Eneida Domingo, BAYRONN, RN, PHN, HNB-BC   10/12/2020 at 9:52 AM      "

## 2020-10-14 ENCOUNTER — TRANSFERRED RECORDS (OUTPATIENT)
Dept: HEALTH INFORMATION MANAGEMENT | Facility: CLINIC | Age: 80
End: 2020-10-14

## 2020-10-14 LAB
ALBUMIN SERPL-MCNC: 4.5 G/DL (ref 3.6–4.6)
ALP SERPL-CCNC: 108 U/L (ref 39–117)
ALT SERPL-CCNC: 5 U/L (ref 0–44)
ANION GAP SERPL CALCULATED.3IONS-SCNC: ABNORMAL MMOL/L
AST SERPL-CCNC: 14 U/L (ref 0–40)
BILIRUB SERPL-MCNC: 0.4 MG/DL (ref 0–1.2)
BUN SERPL-MCNC: 12 MG/DL (ref 18–27)
CALCIUM SERPL-MCNC: 8.8 MG/DL (ref 8.6–10.2)
CHLORIDE SERPLBLD-SCNC: 100 MMOL/L (ref 96–106)
CO2 SERPL-SCNC: 30 MMOL/L (ref 20–29)
CREAT SERPL-MCNC: 1.25 MG/DL (ref 0.76–1.27)
GFR SERPL CREATININE-BSD FRML MDRD: 54 ML/MIN/1.73M2
GLUCOSE SERPL-MCNC: 137 MG/DL (ref 70–99)
POTASSIUM SERPL-SCNC: 3.8 MMOL/L (ref 3.5–5.2)
PROT SERPL-MCNC: 6.3 G/DL (ref 6–8.5)
SODIUM SERPL-SCNC: 143 MMOL/L (ref 134–144)

## 2020-10-14 NOTE — PROGRESS NOTES
[facility]    Orders faxed to patients PCP for labs(BMP) to be done by 10/22. Reminder sent to board to watch for results.  Argelia Bacon RN on 10/14/2020 at 11:17 AM

## 2020-10-14 NOTE — PROGRESS NOTES
[facility] - CORE Clinic Telemanagement  My Health Tracker HF Alert     Weight today: 140#  Weight goal: 145-150#  MyHealth Tracker CHF Flowsheet My weight today is:   10/7/2020 144   10/8/2020 146   10/9/2020 142   10/10/2020 142   10/11/2020 142   10/12/2020 142   10/13/2020 142   10/14/2020 140     Heart Failure sx: none    Daily diuretic plan: Lasix 80mg daily, KCL 20meq daily    Notes: Wt down another 2# today. Wt has been stable between 140-142# since 10/9. Pt feeling well without symptoms. Pt due for visit with Dr. Julien and labs in mid November, not yet scheduled. Messaged scheduling to call pt to arrange those. Will await FORTUNATO Ortiz's recommendations in regards to weight loss.     Future Appointments   Date Time Provider Department Center   10/23/2020  1:30 PM BX COVID LAB BXLAB BLCX   10/27/2020  1:15 PM Brian Garsia MD SXSUR SXSX       ELENI Chavarria, RN, CHFN  10/14/20 at 10:19 AM

## 2020-10-15 NOTE — PROGRESS NOTES
[facility] CORE [facility]    Called patient to ask if he has gotten his labs done. He stated they were done yesterday(Wed 10/14) at his PCP.     Call to [facility]. They confirmed a  CMP was done and are faxing us the results. Reminder sent to board to watch for results and review ayden/Ame Mejía upon receipt.  Argelia Bacon RN on 10/15/2020 at 2:47 PM

## 2020-10-16 ENCOUNTER — DOCUMENTATION ONLY (OUTPATIENT)
Dept: CARDIOLOGY | Facility: CLINIC | Age: 80
End: 2020-10-16

## 2020-10-16 NOTE — PROGRESS NOTES
[facility]    Weight parameters in MHT adjusted to 137 - 143#. Will continue to monitor. Argelia Bacon RN on 10/16/2020 at 11:07 AM

## 2020-10-16 NOTE — PROGRESS NOTES
Excellent - renal function has significantly improved after re-initiation of Losartan.  Please adjust his parameters to 137-143 lbs. Thanks!

## 2020-10-16 NOTE — PROGRESS NOTES
[facility] CORE [facility]    Incoming email w/CMP results:  Component      Latest Ref Rng & Units 10/14/2020   Sodium      134 - 144 mmol/L 143   Potassium      3.5 - 5.2 mmol/L 3.8   Chloride      96 - 106 mmol/L 100   Carbon Dioxide      20 - 29 mmol/L 30 (H)   Glucose      70 - 99 mg/dL 137 (H)   Urea Nitrogen      18 - 27 mg/dL 12 (A)   Creatinine      0.76 - 1.27 mg/dL 1.25   Calcium      8.6 - 10.2 mg/dL 8.8   Protein Total      6.0 - 8.5 g/dL 6.3   Albumin      3.6 - 4.6 g/dL 4.5   Bilirubin Total      0.0 - 1.2 mg/dL 0.4   Alkaline Phosphatase      39 - 117 U/L 108   AST      0 - 40 U/L 14   ALT      0 - 44 U/L 5   GFR Estimate      >59 ml/min/1.73m2 54   GFR Estimate If Black      >59 ml/min/1.73m2 62     Above results reflect increased losartan to 50mg/d. This increase made on 10/8. In addition, per MHT patients weight has dropped to 140# from 146 on 10/8. Will forward to Ame Mejía for review.  Argelia Bacon RN on 10/16/2020 at 9:31 AM

## 2020-10-19 ENCOUNTER — CARE COORDINATION (OUTPATIENT)
Dept: CARDIOLOGY | Facility: CLINIC | Age: 80
End: 2020-10-19

## 2020-10-19 NOTE — PROGRESS NOTES
"[facility]  My Health Tracker HF Alert     Background: Previous weight goal was higher at 140-145#. Mr. Garcia's weight trended downward and a CMP 10/14 showed improved renal function and stable electrolytes.    Today's home wt: 146#  Goal wt: 137-143#  Recent wts:  MyHealth Tracker CHF Flowsheet My weight today is:   10/12/2020 142   10/13/2020 142   10/14/2020 140   10/15/2020 140   10/16/2020 142   10/17/2020 143   10/18/2020 145   10/19/2020 146       Current daily diuretic:     Furosemide 80mg daily   KCL 20meq BID    Assessment:  --Mr. Garcia told me he feels quite well and denied worsened dyspena: \"If I walk a ways I might need a rest.\" and said his edema is controlled, \"My legs look good.\"  --He's been adjusting his diet--adding fiber, fruits, veggies, and would like to gain body mass to a weight of 150#.  --He reported loose stools x1 month, PCP rec'd probiotics which he's taken x1 week--minimal improvement. He asked if Ame Mejía PAC could advise. A stool sample has not been taken  --Upcoming surgery inquinal hernia 10/27/2020 and is requesting 3 day eliquis hold. Hx of Afib.    Plan:  Route to Ame Mejía PAC to advise on weight changes/goal range, loose stools per patient request, and eliquis hold.    Future Appointments   Date Time Provider Department Center   10/23/2020  1:30 PM BX COVID LAB BXLAB BLCX   10/27/2020  1:15 PM Brian Garsia MD SXSUR SXSX   12/9/2020 10:45 AM [facility] RN BSN   1:13 PM 10/19/20          "

## 2020-10-19 NOTE — PROGRESS NOTES
Ok, can go back to goal range of 140-146 lbs.   I unfortunately don't have great advice for him regarding the loose stools. It sounds like he's already been doing the right things, ie increasing fiber and using probiotics. No new cardiac medications to explain this. I think he will need to follow up with his PCP on this.   If his surgeon is requiring a 72 hour Eliquis hold prior to hernia repair, I think that's fine. He has no hx of stroke. They should re-start the Eliquis as soon as the surgeon feels it is safe to do so.   Thanks!

## 2020-10-20 NOTE — PROGRESS NOTES
[facility]    Called Mr. Garcia and reviewed recommendations from Ame Mejía PAC:  --Goal weight range 140-146lbs; updated MyHealth Tracker  --follow-up with PCP re: loose stools  --Ok to hold eliquis 72hours prior to hernia repair, restart as soon as safely able per surgery.    Mr. Garcia verbalized understanding of above. Wished him well with his surgery and recovery.    Future Appointments   Date Time Provider Department Center   10/23/2020  1:30 PM BX COVID LAB BXLAB BLCX   10/27/2020  1:15 PM Brian Garsia MD SXSUR SXSX   12/9/2020 10:45 AM Cony Julien DO [facility] PSA CLIN     Debi Mills RN BSN   11:24 AM 10/20/20

## 2020-10-23 ENCOUNTER — CARE COORDINATION (OUTPATIENT)
Dept: CARDIOLOGY | Facility: CLINIC | Age: 80
End: 2020-10-23

## 2020-10-23 DIAGNOSIS — N18.30 CRF (CHRONIC RENAL FAILURE), STAGE 3 (MODERATE) (H): Primary | ICD-10-CM

## 2020-10-23 DIAGNOSIS — Z11.59 ENCOUNTER FOR SCREENING FOR OTHER VIRAL DISEASES: ICD-10-CM

## 2020-10-23 DIAGNOSIS — E87.6 HYPOKALEMIA: ICD-10-CM

## 2020-10-23 PROCEDURE — U0003 INFECTIOUS AGENT DETECTION BY NUCLEIC ACID (DNA OR RNA); SEVERE ACUTE RESPIRATORY SYNDROME CORONAVIRUS 2 (SARS-COV-2) (CORONAVIRUS DISEASE [COVID-19]), AMPLIFIED PROBE TECHNIQUE, MAKING USE OF HIGH THROUGHPUT TECHNOLOGIES AS DESCRIBED BY CMS-2020-01-R: HCPCS | Performed by: SURGERY

## 2020-10-23 RX ORDER — POTASSIUM CHLORIDE 750 MG/1
20 TABLET, EXTENDED RELEASE ORAL DAILY
Qty: 360 TABLET | Refills: 1 | Status: SHIPPED | OUTPATIENT
Start: 2020-10-23 | End: 2020-11-16

## 2020-10-23 RX ORDER — FUROSEMIDE 20 MG
TABLET ORAL
Qty: 10 TABLET | Refills: 0 | Status: ON HOLD | OUTPATIENT
Start: 2020-10-23 | End: 2020-10-27

## 2020-10-23 RX ORDER — SPIRONOLACTONE 25 MG/1
25 TABLET ORAL DAILY
Qty: 90 TABLET | Refills: 1 | Status: ON HOLD | OUTPATIENT
Start: 2020-10-23 | End: 2020-10-27

## 2020-10-23 NOTE — PROGRESS NOTES
Even if he is attempting to build muscle, his weight trend is indicative of fluid retention. He's also going to receive fluids during his upcoming surgery. Please ask him to increase the furosemide to 100 mg daily for the next two days, then return to 80 daily. Start taking spironolactone 25 mg daily. Reduce potassium to 40 daily from BID. He should see me with labs in 2 weeks. Thanks.

## 2020-10-23 NOTE — PROGRESS NOTES
[facility] - CORE Clinic Telemanagement  My Health Tracker HF Alert     Weight today: 147#    Weight goal: 140-146#  MyHealth Tracker CHF Flowsheet My weight today is:   10/2/2020 147   10/3/2020 147   10/5/2020 146   10/6/2020 144   10/7/2020 144   10/8/2020 146   10/9/2020 142   10/10/2020 142   10/11/2020 142   10/12/2020 142   10/13/2020 142   10/14/2020 140   10/15/2020 140   10/16/2020 142   10/17/2020 143   10/18/2020 145   10/19/2020 146   10/20/2020 146   10/21/2020 146   10/22/2020 147   10/23/2020 147     Heart Failure sx: none    Daily diuretic plan:   Lasix 80mg daily  [id number] BID    Notes: Per previous notes from this week. Pt has been adjusting diet, adding fiber, tring to gain body mass, and would like to get to a weight of 150#. No HF symptoms reported. Wt above new goal weight by 1#. Next follow up in December with Dr. Julien. Called pt for update, no answer. LVM for call back.     Sent update to FORTUNATO Ortiz.     Future Appointments   Date Time Provider Department Center   10/27/2020  1:15 PM Brian Garsia MD SXSUR SXSX   12/9/2020 10:45 AM Cony Julien DO [facility] PSA CLIN       BAYRON ChavarriaN, RN, CHFN  10/23/20 at 1:47 PM

## 2020-10-23 NOTE — PROGRESS NOTES
[facility]    Asked for clarification from Ame re: KCL, as pt is currently taking 20 mEq BID. She advised that he should then decrease to 20 mEq daily.     Called pt and reviewed recs. We went through med changes multiple times and he verbalized agreement and understanding but unsure if he is comprehending. Asked pt if it would be helpful to write changes down. Pt requested email be sent to him. Advised that unfortunately, we cannot email but can send a [employer] message. Pt states he keeps getting told his password is incorrect every time he tries to log on to [employer]. Changed password for pt over the phone. Sent recs per Ame in [employer] message and asked for message back to confirm. Will also send updated med list in the mail next week - reminder to RN board.    Eneida Domingo, BSN, RN, PHN, HNB-BC   10/23/2020 at 4:12 PM

## 2020-10-24 LAB
SARS-COV-2 RNA SPEC QL NAA+PROBE: NOT DETECTED
SPECIMEN SOURCE: NORMAL

## 2020-10-26 ENCOUNTER — ANESTHESIA EVENT (OUTPATIENT)
Dept: SURGERY | Facility: CLINIC | Age: 80
End: 2020-10-26
Payer: COMMERCIAL

## 2020-10-26 NOTE — PROGRESS NOTES
[facility]  Does not look like patient viewed MyChart. Weight today 149#, same the last few days, and actually up another 2# from Friday 10/23. No symptoms reported.     Weight today: 149#  Weight goal: 140-146#  MyHealth Tracker CHF Flowsheet My weight today is:   10/19/2020 146   10/20/2020 146   10/21/2020 146   10/22/2020 147   10/23/2020 147   10/24/2020 147   10/25/2020 149   10/26/2020 149     Heart Failure sx: none    Daily diuretic plan:   Lasix 80mg daily  Spironolactone 25mg daily  KCL 20meq daily    Notes: Called pt for update, he confirms he took extra 20mg of Lasix both Saturday and Sunday for a total of 100mg of Lasix x 2 days. He took 80mg of Lasix today. Pt states his pharmacy was closed due to an emergency so he was unable to get new prescription. He will be picking Spironolactone this afternoon. He is scheduled for surgery tomorrow, so states he will start the Spironolactone on Wednesday, as well as decrease in KCL. Pt denies increased SOB or swelling. Pt reports feeling just fine without concerns. Will send update to FORTUNATO Ortiz. Will call pt back if changes.      Future Appointments   Date Time Provider Department Center   10/27/2020  1:15 PM Brian Garsia MD SXSUR SXSX   12/9/2020 10:45 AM [facility]ien[facility]       BAYRON ChavarriaN, RN, CHFN  10/26/20 at 1:05 PM

## 2020-10-27 ENCOUNTER — ANESTHESIA (OUTPATIENT)
Dept: SURGERY | Facility: CLINIC | Age: 80
End: 2020-10-27
Payer: COMMERCIAL

## 2020-10-27 ENCOUNTER — APPOINTMENT (OUTPATIENT)
Dept: SURGERY | Facility: PHYSICIAN GROUP | Age: 80
End: 2020-10-27
Payer: COMMERCIAL

## 2020-10-27 ENCOUNTER — SURGERY (OUTPATIENT)
Age: 80
End: 2020-10-27
Payer: COMMERCIAL

## 2020-10-27 ENCOUNTER — HOSPITAL ENCOUNTER (OUTPATIENT)
Facility: CLINIC | Age: 80
Discharge: HOME | End: 2020-10-27
Attending: SURGERY | Admitting: SURGERY
Payer: COMMERCIAL

## 2020-10-27 VITALS
DIASTOLIC BLOOD PRESSURE: 82 MMHG | OXYGEN SATURATION: 98 % | HEART RATE: 98 BPM | TEMPERATURE: 97.7 F | SYSTOLIC BLOOD PRESSURE: 136 MMHG | BODY MASS INDEX: 22.5 KG/M2 | RESPIRATION RATE: 20 BRPM | HEIGHT: 69 IN | WEIGHT: 151.9 LBS

## 2020-10-27 DIAGNOSIS — G89.18 POSTOPERATIVE PAIN: Primary | ICD-10-CM

## 2020-10-27 DIAGNOSIS — K40.20 NON-RECURRENT BILATERAL INGUINAL HERNIA WITHOUT OBSTRUCTION OR GANGRENE: ICD-10-CM

## 2020-10-27 LAB
GLUCOSE BLDC GLUCOMTR-MCNC: 205 MG/DL (ref 70–99)
GLUCOSE BLDC GLUCOMTR-MCNC: 223 MG/DL (ref 70–99)
POTASSIUM SERPL-SCNC: 3.7 MMOL/L (ref 3.4–5.3)

## 2020-10-27 PROCEDURE — 250N000011 HC RX IP 250 OP 636: Performed by: NURSE ANESTHETIST, CERTIFIED REGISTERED

## 2020-10-27 PROCEDURE — 250N000009 HC RX 250: Performed by: NURSE ANESTHETIST, CERTIFIED REGISTERED

## 2020-10-27 PROCEDURE — 370N000002 HC ANESTHESIA TECHNICAL FEE, EACH ADDTL 15 MIN: Performed by: SURGERY

## 2020-10-27 PROCEDURE — 84132 ASSAY OF SERUM POTASSIUM: CPT | Performed by: ANESTHESIOLOGY

## 2020-10-27 PROCEDURE — 999N000139 HC STATISTIC PRE-PROCEDURE ASSESSMENT II: Performed by: SURGERY

## 2020-10-27 PROCEDURE — 370N000001 HC ANESTHESIA TECHNICAL FEE, 1ST 30 MIN: Performed by: SURGERY

## 2020-10-27 PROCEDURE — 360N000021 HC SURGERY LEVEL 3 EA 15 ADDTL MIN: Performed by: SURGERY

## 2020-10-27 PROCEDURE — 49650 LAP ING HERNIA REPAIR INIT: CPT | Mod: 50 | Performed by: SURGERY

## 2020-10-27 PROCEDURE — 250N000003 HC SEVOFLURANE, EA 15 MIN: Performed by: SURGERY

## 2020-10-27 PROCEDURE — 250N000011 HC RX IP 250 OP 636: Performed by: SURGERY

## 2020-10-27 PROCEDURE — 49650 LAP ING HERNIA REPAIR INIT: CPT | Mod: 50 | Performed by: PHYSICIAN ASSISTANT

## 2020-10-27 PROCEDURE — 761N000001 HC RECOVERY PHASE 1 LEVEL 1 FIRST HR: Performed by: SURGERY

## 2020-10-27 PROCEDURE — C1781 MESH (IMPLANTABLE): HCPCS | Performed by: SURGERY

## 2020-10-27 PROCEDURE — 258N000003 HC RX IP 258 OP 636: Performed by: NURSE ANESTHETIST, CERTIFIED REGISTERED

## 2020-10-27 PROCEDURE — 761N000007 HC RECOVERY PHASE 2 EACH 15 MINS: Performed by: SURGERY

## 2020-10-27 PROCEDURE — 360N000020 HC SURGERY LEVEL 3 1ST 30 MIN: Performed by: SURGERY

## 2020-10-27 PROCEDURE — 250N000013 HC RX MED GY IP 250 OP 250 PS 637: Performed by: PHYSICIAN ASSISTANT

## 2020-10-27 PROCEDURE — 250N000009 HC RX 250: Performed by: SURGERY

## 2020-10-27 PROCEDURE — 36415 COLL VENOUS BLD VENIPUNCTURE: CPT | Performed by: ANESTHESIOLOGY

## 2020-10-27 PROCEDURE — 272N000001 HC OR GENERAL SUPPLY STERILE: Performed by: SURGERY

## 2020-10-27 PROCEDURE — 999N001017 HC STATISTIC GLUCOSE BY METER IP

## 2020-10-27 DEVICE — MESH PROGRIP LAPAROSCOPIC 5.9X3.9" PARIETEX SELF-FIX LPG1510: Type: IMPLANTABLE DEVICE | Site: INGUINAL | Status: FUNCTIONAL

## 2020-10-27 RX ORDER — CEFAZOLIN SODIUM 1 G/3ML
1 INJECTION, POWDER, FOR SOLUTION INTRAMUSCULAR; INTRAVENOUS SEE ADMIN INSTRUCTIONS
Status: DISCONTINUED | OUTPATIENT
Start: 2020-10-27 | End: 2020-10-27 | Stop reason: HOSPADM

## 2020-10-27 RX ORDER — AMOXICILLIN 250 MG
1-2 CAPSULE ORAL 2 TIMES DAILY PRN
Qty: 20 TABLET | Refills: 0 | Status: SHIPPED | OUTPATIENT
Start: 2020-10-27 | End: 2021-03-17

## 2020-10-27 RX ORDER — CEFAZOLIN SODIUM 2 G/100ML
2 INJECTION, SOLUTION INTRAVENOUS
Status: COMPLETED | OUTPATIENT
Start: 2020-10-27 | End: 2020-10-27

## 2020-10-27 RX ORDER — DIMENHYDRINATE 50 MG/ML
12.5 INJECTION, SOLUTION INTRAMUSCULAR; INTRAVENOUS
Status: DISCONTINUED | OUTPATIENT
Start: 2020-10-27 | End: 2020-10-27 | Stop reason: HOSPADM

## 2020-10-27 RX ORDER — HYDROMORPHONE HYDROCHLORIDE 1 MG/ML
.3-.5 INJECTION, SOLUTION INTRAMUSCULAR; INTRAVENOUS; SUBCUTANEOUS EVERY 10 MIN PRN
Status: DISCONTINUED | OUTPATIENT
Start: 2020-10-27 | End: 2020-10-27 | Stop reason: HOSPADM

## 2020-10-27 RX ORDER — MEPERIDINE HYDROCHLORIDE 25 MG/ML
12.5 INJECTION INTRAMUSCULAR; INTRAVENOUS; SUBCUTANEOUS
Status: DISCONTINUED | OUTPATIENT
Start: 2020-10-27 | End: 2020-10-27 | Stop reason: HOSPADM

## 2020-10-27 RX ORDER — LIDOCAINE 40 MG/G
CREAM TOPICAL
Status: DISCONTINUED | OUTPATIENT
Start: 2020-10-27 | End: 2020-10-27 | Stop reason: HOSPADM

## 2020-10-27 RX ORDER — ONDANSETRON 2 MG/ML
INJECTION INTRAMUSCULAR; INTRAVENOUS PRN
Status: DISCONTINUED | OUTPATIENT
Start: 2020-10-27 | End: 2020-10-27

## 2020-10-27 RX ORDER — FENTANYL CITRATE 50 UG/ML
25-50 INJECTION, SOLUTION INTRAMUSCULAR; INTRAVENOUS EVERY 5 MIN PRN
Status: DISCONTINUED | OUTPATIENT
Start: 2020-10-27 | End: 2020-10-27 | Stop reason: HOSPADM

## 2020-10-27 RX ORDER — HYDROCODONE BITARTRATE AND ACETAMINOPHEN 5; 325 MG/1; MG/1
1-2 TABLET ORAL EVERY 4 HOURS PRN
Qty: 12 TABLET | Refills: 0 | Status: SHIPPED | OUTPATIENT
Start: 2020-10-27 | End: 2020-11-02

## 2020-10-27 RX ORDER — ALBUTEROL SULFATE 0.83 MG/ML
2.5 SOLUTION RESPIRATORY (INHALATION)
Status: DISCONTINUED | OUTPATIENT
Start: 2020-10-27 | End: 2020-10-27 | Stop reason: HOSPADM

## 2020-10-27 RX ORDER — FENTANYL CITRATE 50 UG/ML
INJECTION, SOLUTION INTRAMUSCULAR; INTRAVENOUS PRN
Status: DISCONTINUED | OUTPATIENT
Start: 2020-10-27 | End: 2020-10-27

## 2020-10-27 RX ORDER — LIDOCAINE HYDROCHLORIDE 20 MG/ML
INJECTION, SOLUTION INFILTRATION; PERINEURAL PRN
Status: DISCONTINUED | OUTPATIENT
Start: 2020-10-27 | End: 2020-10-27

## 2020-10-27 RX ORDER — ONDANSETRON 2 MG/ML
4 INJECTION INTRAMUSCULAR; INTRAVENOUS EVERY 30 MIN PRN
Status: DISCONTINUED | OUTPATIENT
Start: 2020-10-27 | End: 2020-10-27 | Stop reason: HOSPADM

## 2020-10-27 RX ORDER — GLYCOPYRROLATE 0.2 MG/ML
INJECTION, SOLUTION INTRAMUSCULAR; INTRAVENOUS PRN
Status: DISCONTINUED | OUTPATIENT
Start: 2020-10-27 | End: 2020-10-27

## 2020-10-27 RX ORDER — SODIUM CHLORIDE, SODIUM LACTATE, POTASSIUM CHLORIDE, CALCIUM CHLORIDE 600; 310; 30; 20 MG/100ML; MG/100ML; MG/100ML; MG/100ML
INJECTION, SOLUTION INTRAVENOUS CONTINUOUS PRN
Status: DISCONTINUED | OUTPATIENT
Start: 2020-10-27 | End: 2020-10-27

## 2020-10-27 RX ORDER — MAGNESIUM HYDROXIDE 1200 MG/15ML
LIQUID ORAL PRN
Status: DISCONTINUED | OUTPATIENT
Start: 2020-10-27 | End: 2020-10-27 | Stop reason: HOSPADM

## 2020-10-27 RX ORDER — ONDANSETRON 4 MG/1
4 TABLET, ORALLY DISINTEGRATING ORAL EVERY 30 MIN PRN
Status: DISCONTINUED | OUTPATIENT
Start: 2020-10-27 | End: 2020-10-27 | Stop reason: HOSPADM

## 2020-10-27 RX ORDER — DEXAMETHASONE SODIUM PHOSPHATE 4 MG/ML
4 INJECTION, SOLUTION INTRA-ARTICULAR; INTRALESIONAL; INTRAMUSCULAR; INTRAVENOUS; SOFT TISSUE EVERY 10 MIN PRN
Status: DISCONTINUED | OUTPATIENT
Start: 2020-10-27 | End: 2020-10-27 | Stop reason: HOSPADM

## 2020-10-27 RX ORDER — NEOSTIGMINE METHYLSULFATE 1 MG/ML
VIAL (ML) INJECTION PRN
Status: DISCONTINUED | OUTPATIENT
Start: 2020-10-27 | End: 2020-10-27

## 2020-10-27 RX ORDER — NALOXONE HYDROCHLORIDE 0.4 MG/ML
.1-.4 INJECTION, SOLUTION INTRAMUSCULAR; INTRAVENOUS; SUBCUTANEOUS
Status: DISCONTINUED | OUTPATIENT
Start: 2020-10-27 | End: 2020-10-27 | Stop reason: HOSPADM

## 2020-10-27 RX ORDER — PROPOFOL 10 MG/ML
INJECTION, EMULSION INTRAVENOUS PRN
Status: DISCONTINUED | OUTPATIENT
Start: 2020-10-27 | End: 2020-10-27

## 2020-10-27 RX ORDER — SODIUM CHLORIDE, SODIUM LACTATE, POTASSIUM CHLORIDE, CALCIUM CHLORIDE 600; 310; 30; 20 MG/100ML; MG/100ML; MG/100ML; MG/100ML
INJECTION, SOLUTION INTRAVENOUS CONTINUOUS
Status: DISCONTINUED | OUTPATIENT
Start: 2020-10-27 | End: 2020-10-27 | Stop reason: HOSPADM

## 2020-10-27 RX ORDER — LIDOCAINE HYDROCHLORIDE 10 MG/ML
INJECTION, SOLUTION EPIDURAL; INFILTRATION; INTRACAUDAL; PERINEURAL
Status: DISCONTINUED
Start: 2020-10-27 | End: 2020-10-27 | Stop reason: HOSPADM

## 2020-10-27 RX ORDER — HYDROCODONE BITARTRATE AND ACETAMINOPHEN 5; 325 MG/1; MG/1
1-2 TABLET ORAL
Status: COMPLETED | OUTPATIENT
Start: 2020-10-27 | End: 2020-10-27

## 2020-10-27 RX ADMIN — BUPIVACAINE HYDROCHLORIDE AND EPINEPHRINE 20 ML: 5; 5 INJECTION, SOLUTION EPIDURAL; INTRACAUDAL; PERINEURAL at 14:28

## 2020-10-27 RX ADMIN — LIDOCAINE HYDROCHLORIDE 60 MG: 20 INJECTION, SOLUTION INFILTRATION; PERINEURAL at 13:39

## 2020-10-27 RX ADMIN — HYDROCODONE BITARTRATE AND ACETAMINOPHEN 1 TABLET: 5; 325 TABLET ORAL at 15:36

## 2020-10-27 RX ADMIN — ROCURONIUM BROMIDE 40 MG: 10 INJECTION INTRAVENOUS at 13:39

## 2020-10-27 RX ADMIN — SODIUM CHLORIDE 1000 ML: 900 IRRIGANT IRRIGATION at 14:05

## 2020-10-27 RX ADMIN — CEFAZOLIN SODIUM 2 G: 2 INJECTION, SOLUTION INTRAVENOUS at 13:45

## 2020-10-27 RX ADMIN — PROPOFOL 90 MG: 10 INJECTION, EMULSION INTRAVENOUS at 13:39

## 2020-10-27 RX ADMIN — GLYCOPYRROLATE 0.6 MG: 0.2 INJECTION, SOLUTION INTRAMUSCULAR; INTRAVENOUS at 14:29

## 2020-10-27 RX ADMIN — FENTANYL CITRATE 25 MCG: 50 INJECTION, SOLUTION INTRAMUSCULAR; INTRAVENOUS at 13:39

## 2020-10-27 RX ADMIN — SODIUM CHLORIDE, POTASSIUM CHLORIDE, SODIUM LACTATE AND CALCIUM CHLORIDE: 600; 310; 30; 20 INJECTION, SOLUTION INTRAVENOUS at 13:25

## 2020-10-27 RX ADMIN — HYDROMORPHONE HYDROCHLORIDE 0.5 MG: 1 INJECTION, SOLUTION INTRAMUSCULAR; INTRAVENOUS; SUBCUTANEOUS at 14:12

## 2020-10-27 RX ADMIN — ONDANSETRON 4 MG: 2 INJECTION INTRAMUSCULAR; INTRAVENOUS at 14:29

## 2020-10-27 RX ADMIN — FENTANYL CITRATE 75 MCG: 50 INJECTION, SOLUTION INTRAMUSCULAR; INTRAVENOUS at 14:01

## 2020-10-27 RX ADMIN — NEOSTIGMINE METHYLSULFATE 3 MG: 1 INJECTION, SOLUTION INTRAVENOUS at 14:29

## 2020-10-27 SDOH — HEALTH STABILITY: MENTAL HEALTH: HOW OFTEN DO YOU HAVE A DRINK CONTAINING ALCOHOL?: NEVER

## 2020-10-27 ASSESSMENT — COPD QUESTIONNAIRES
CAT_SEVERITY: MILD
COPD: 1

## 2020-10-27 ASSESSMENT — ENCOUNTER SYMPTOMS
DYSRHYTHMIAS: 1
SEIZURES: 0

## 2020-10-27 ASSESSMENT — MIFFLIN-ST. JEOR: SCORE: 1384.39

## 2020-10-27 NOTE — ANESTHESIA POSTPROCEDURE EVALUATION
Patient: Tc Garcia    Procedure(s):  LAPAROSCOPIC BILATERAL INGUINAL HERNIA REPAIR WITH MESH    Diagnosis:Non-recurrent bilateral inguinal hernia without obstruction or gangrene [K40.20]  Diagnosis Additional Information: No value filed.    Anesthesia Type:  General    Note:  Anesthesia Post Evaluation    Patient location during evaluation: PACU  Patient participation: Able to fully participate in evaluation  Level of consciousness: awake and alert  Pain management: adequate  Airway patency: patent  Cardiovascular status: acceptable and hemodynamically stable  Respiratory status: nonlabored ventilation, unassisted and acceptable  Hydration status: acceptable  PONV: none       Comments: Doing well - blood glucose is somewhat elevated, but will wait a little while until the patient is more awake to restart his insulin pump.          Last vitals:  Vitals:    10/27/20 1500 10/27/20 1515 10/27/20 1530   BP: (!) 141/94 (!) 140/77 (!) 146/98   Pulse: 90 88 98   Resp: 19 20 27   Temp: 36.3  C (97.3  F) 36.5  C (97.7  F) 36.4  C (97.5  F)   SpO2: 100% 96% 96%         Electronically Signed By: Héctor Lovett MD  October 27, 2020  3:50 PM

## 2020-10-27 NOTE — DISCHARGE INSTRUCTIONS
Same Day Surgery Discharge Instructions for  Sedation and General Anesthesia       It's not unusual to feel dizzy, light-headed or faint for up to 24 hours after surgery or while taking pain medication.  If you have these symptoms: sit for a few minutes before standing and have someone assist you when you get up to walk or use the bathroom.      You should rest and relax for the next 24 hours. We recommend you make arrangements to have an adult stay with you for at least 24 hours after your discharge.  Avoid hazardous and strenuous activity.      DO NOT DRIVE any vehicle or operate mechanical equipment for 24 hours following the end of your surgery.  Even though you may feel normal, your reactions may be affected by the medication you have received.      Do not drink alcoholic beverages for 24 hours following surgery.       Slowly progress to your regular diet as you feel able. It's not unusual to feel nauseated and/or vomit after receiving anesthesia.  If you develop these symptoms, drink clear liquids (apple juice, ginger ale, broth, 7-up, etc. ) until you feel better.  If your nausea and vomiting persists for 24 hours, please notify your surgeon.        All narcotic pain medications, along with inactivity and anesthesia, can cause constipation. Drinking plenty of liquids and increasing fiber intake will help.      For any questions of a medical nature, call your surgeon.      Do not make important decisions for 24 hours.      If you had general anesthesia, you may have a sore throat for a couple of days related to the breathing tube used during surgery.  You may use Cepacol lozenges to help with this discomfort.  If it worsens or if you develop a fever, contact your surgeon.       If you feel your pain is not well managed with the pain medications prescribed by your surgeon, please contact your surgeon's office to let them know so they can address your concerns.       CoVid 19 Information    We want to give you  information regarding Covid. Please consult your primary care provider with any questions you might have.     Patient who have symptoms (cough, fever, or shortness of breath), need to isolate for 7 days from when symptoms started OR 72 hours after fever resolves (without fever reducing medications) AND improvement of respiratory symptoms (whichever is longer).      Isolate yourself at home (in own room/own bathroom if possible)    Do Not allow any visitors    Do Not go to work or school    Do Not go to [employer],  centers, shopping, or other public places.    Do Not shake hands.    Avoid close and intimate contact with others (hugging, kissing).    Follow CDC recommendations for household cleaning of frequently touched services.     After the initial 7 days, continue to isolate yourself from household members as much as possible. To continue decrease the risk of community spread and exposure, you and any members of your household should limit activities in public for 14 days after starting home isolation.     You can reference the following CDC link for helpful home isolation/care tips:  https://www.cdc.gov/coronavirus/2019-ncov/downloads/10Things.pdf    Protect Others:    Cover Your Mouth and Nose with a mask, disposable tissue or wash cloth to avoid spreading germs to others.    Wash your hands and face frequently with soap and water    Call Your Primary Doctor If: Breathing difficulty develops or you become worse.    For more information about COVID19 and options for caring for yourself at home, please visit the CDC website at https://www.cdc.gov/coronavirus/2019-ncov/about/steps-when-sick.html  For more options for care at [facility], please visit our website at [email or web address]        [facility]  Discharge Instructions: Post-Operative Laparoscopic Inguinal Hernia    ACTIVITY    Take frequent, short walks and increase your  activity gradually.      Avoid strenuous physical activity or heavy lifting greater than 15 lbs. for 3-4 weeks.  You may climb stairs.    You may drive without restrictions when you are not using any prescription pain medication and feel comfortable in a car.    You may return to work/school when you are comfortable without any prescription pain medication.    WOUND CARE    You may remove your outer dressing or Band-Aids and shower 48 hours after the surgery.  Pat your incisions dry and leave them open to air.  Re-apply dressing (Band-Aids or gauze/tape) as needed for comfort or drainage.    You may have steri-strips (looks like white tape) on your incision.  You may peel off the steri-strips 2 weeks after your surgery if they have not peeled off on their own.     Do not soak your incisions in a tub or pool for 2 weeks.     Do not apply any lotions, creams, or ointments to your incisions.    A ridge under your incisions is normal and will gradually resolve.    DIET    Start with liquids, then gradually resume your regular diet as tolerated.     Drink plenty of fluids to stay hydrated.    PAIN    Expect some tenderness and discomfort at the incision site(s).  Use the prescribed pain medication at your discretion.  Expect gradual resolution of your pain over several days.    You may take ibuprofen with food (unless you have been told not to) instead of or in addition to your prescribed pain medication.  If you are taking Norco or Percocet, do not take any additional acetaminophen/APAP/Tylenol.    Do not drink alcohol or drive while you are taking pain medications.    You may apply ice to your incisions in 20 minute intervals as needed for the next 48 hours.  After that time, consider switching to heat if you prefer.    EXPECTATIONS    You may notice air in your scrotum and/or penis after the surgery.  This is due to the gas used during the surgery.  You can expect some swelling and bruising involving the scrotum  and/or penis as well as numbness.  These symptoms are expected and should gradually resolve in the next few days.  You may use ice to help with the swelling and try placing a rolled hand towel below your scrotum to help alleviate scrotal discomfort.  If you develop significant testicular or penile pain, please call our office and speak with a nurse.    Pain medications can cause constipation.  Limit use when possible.  Take over the counter stool softener/stimulant, such as Colace or Senna, 1-2 times a day with plenty of water.  You may take a mild over the counter laxative, such as Miralax or a suppository, as needed.  You may take 1 oz. (2 tablespoons) Milk of Magnesia the evening following surgery to encourage bowel movement.  You may discontinue these medications once you are having regular bowel movements and/or are no longer taking your narcotic pain medication.     You may have shoulder or upper back discomfort due to the gas used in surgery.  This is temporary and should resolve in 48-72 hours.  Short, frequent walks may help with this.    FOLLOW UP    Our office will contact you in approximately 2 weeks to check on your progress and answer any questions you may have.  If you are doing well, you will not need to return for a follow up appointment.  If any concerns are identified over the phone, we will help you make an appointment to see a provider.     If you have not received a phone call, have any questions or concerns, or would like to be seen, please call us at [phone] and ask to speak with our nurse.  We are located at [address] [place] [address].    CALL OUR OFFICE -[phone] IF YOU HAVE:     Chills or fever above 101 F.    Increased redness, warmth, or drainage at your incisions.    Significant bleeding.    Pain not relieved by your pain medication or rest.    Increasing pain after the first 48 hours.    Any other concerns or questions.    Revised January 2018          **If you have questions or concerns about your procedure,  call Dr. Garsia at [phone]**      Same Day Surgery Discharge Instructions for  Sedation and General Anesthesia       It's not unusual to feel dizzy, light-headed or faint for up to 24 hours after surgery or while taking pain medication.  If you have these symptoms: sit for a few minutes before standing and have someone assist you when you get up to walk or use the bathroom.      You should rest and relax for the next 24 hours. We recommend you make arrangements to have an adult stay with you for at least 24 hours after your discharge.  Avoid hazardous and strenuous activity.      DO NOT DRIVE any vehicle or operate mechanical equipment for 24 hours following the end of your surgery.  Even though you may feel normal, your reactions may be affected by the medication you have received.      Do not drink alcoholic beverages for 24 hours following surgery.       Slowly progress to your regular diet as you feel able. It's not unusual to feel nauseated and/or vomit after receiving anesthesia.  If you develop these symptoms, drink clear liquids (apple juice, ginger ale, broth, 7-up, etc. ) until you feel better.  If your nausea and vomiting persists for 24 hours, please notify your surgeon.        All narcotic pain medications, along with inactivity and anesthesia, can cause constipation. Drinking plenty of liquids and increasing fiber intake will help.      For any questions of a medical nature, call your surgeon.      Do not make important decisions for 24 hours.      If you had general anesthesia, you may have a sore throat for a couple of days related to the breathing tube used during surgery.  You may use Cepacol lozenges to help with this discomfort.  If it worsens or if you develop a fever, contact your surgeon.       If you feel your pain is not well managed with the pain medications prescribed by your surgeon, please contact your surgeon's office  to let them know so they can address your concerns.       CoVid 19 Information    We want to give you information regarding Covid. Please consult your primary care provider with any questions you might have.     Patient who have symptoms (cough, fever, or shortness of breath), need to isolate for 7 days from when symptoms started OR 72 hours after fever resolves (without fever reducing medications) AND improvement of respiratory symptoms (whichever is longer).      Isolate yourself at home (in own room/own bathroom if possible)    Do Not allow any visitors    Do Not go to work or school    Do Not go to [employer],  centers, shopping, or other public places.    Do Not shake hands.    Avoid close and intimate contact with others (hugging, kissing).    Follow CDC recommendations for household cleaning of frequently touched services.     After the initial 7 days, continue to isolate yourself from household members as much as possible. To continue decrease the risk of community spread and exposure, you and any members of your household should limit activities in public for 14 days after starting home isolation.     You can reference the following CDC link for helpful home isolation/care tips:  https://www.cdc.gov/coronavirus/2019-ncov/downloads/10Things.pdf    Protect Others:    Cover Your Mouth and Nose with a mask, disposable tissue or wash cloth to avoid spreading germs to others.    Wash your hands and face frequently with soap and water    Call Your Primary Doctor If: Breathing difficulty develops or you become worse.    For more information about COVID19 and options for caring for yourself at home, please visit the CDC website at https://www.cdc.gov/coronavirus/2019-ncov/about/steps-when-sick.html  For more options for care at [facility], please visit our website at [email or web address]

## 2020-10-27 NOTE — OR NURSING
Discharge instructions done with Radha wife on phone due to covid. Patient has AVS and discharge meds in plastic belongings bag. Patient ordered to void before discharge.

## 2020-10-27 NOTE — ANESTHESIA CARE TRANSFER NOTE
Patient: Tc Garcia    Procedure(s):  LAPAROSCOPIC BILATERAL INGUINAL HERNIA REPAIR WITH MESH    Diagnosis: Non-recurrent bilateral inguinal hernia without obstruction or gangrene [K40.20]  Diagnosis Additional Information: No value filed.    Anesthesia Type:   General     Note:  Airway :Face Mask  Patient transferred to:PACU  Comments: Neuromuscular blockade reversed after TOF 4/4, spontaneous respirations, adequate tidal volumes, followed commands to voice, oropharynx suctioned with soft flexible catheter, extubated atraumatically, extubated with suction, airway patent after extubation.  Oxygen via facemask at 6 liters per minute to PACU. After extubation, patient remained in OR for 14 minutes until transport to PACU due to standard hospital COVID-19 precautions, Oxygen tubing connected to wall O2 in PACU, SpO2, NiBP, and EKG monitors and alarms on and functioning, report on patient's clinical status given to PACU RN, RN questions answered.   Handoff Report: Identifed the Patient, Identified the Reponsible Provider, Reviewed the pertinent medical history, Discussed the surgical course, Reviewed Intra-OP anesthesia mangement and issues during anesthesia, Set expectations for post-procedure period and Allowed opportunity for questions and acknowledgement of understanding      Vitals: (Last set prior to Anesthesia Care Transfer)    CRNA VITALS  10/27/2020 1412 - 10/27/2020 1448      10/27/2020             Pulse:  94    Ht Rate:  85    SpO2:  100 %    Resp Rate (observed):  (!) 2                Electronically Signed By: OMAR Melgar CRNA  October 27, 2020  2:48 PM

## 2020-10-27 NOTE — ANESTHESIA PREPROCEDURE EVALUATION
Anesthesia Pre-Procedure Evaluation    Patient: Tc Garcia   MRN: [id number] : 1940          Preoperative Diagnosis: Non-recurrent bilateral inguinal hernia without obstruction or gangrene [K40.20]    Procedure(s):  LAPAROSCOPIC BILATERAL INGUINAL HERNIA REPAIR WITH MESH    Past Medical History:   Diagnosis Date     Anemia      Anemia of chronic disease 2020     Back pain      CKD (chronic kidney disease) stage 3, GFR 30-59 ml/min      CRF (chronic renal failure), stage 3  2020     Fall 2019    suffered multiple left rib fractures, C3 and T2 fractures     Mixed hyperlipidemia 2004     Paroxysmal atrial fibrillation (H)      Personal history of colonic polyps     gets colonoscopy every five years, due in      Pleural effusion on left 2019    after multiple rib fractures     Pulmonary hypertension (H) 5/10/2020    Added automatically from request for surgery [id number]     Recurrent Left Pleural effusion -- S/P Pleurex Cath 2019     Rosacea      Type II or unspecified type diabetes mellitus without mention of complication, not stated as uncontrolled      Unspecified essential hypertension      Past Surgical History:   Procedure Laterality Date     ANESTHESIA CARDIOVERSION N/A 2/3/2020    Procedure: ANESTHESIA, FOR CARDIOVERSION;  Surgeon: GENERIC ANESTHESIA PROVIDER;  Location:  OR      RIGHT HEART CATH N/A 2020    Procedure: Right Heart Cath;  Surgeon: Senthil Silva MD;  Location:  HEART CARDIAC CATH LAB     HC REMOVE TONSILS/ADENOIDS,<11 Y/O      T & A <11y.o.     IR CHEST TUBE DRAIN TUNNELED LEFT  2020     IR CHEST TUBE PLACEMENT NON-TUNNELLED LEFT  2020     IR CHEST TUBE REMOVAL NON TUNNELED LEFT  2020     IR CHEST TUBE REMOVAL TUNNELED LEFT  2020       Anesthesia Evaluation     .             ROS/MED HX    ENT/Pulmonary: Comment: Recurrent left pleural effusion s/p pleurx catheter insertion    (+)mild COPD, , . .    (-) sleep apnea   Neurologic:      (-) seizures, CVA and TIA   Cardiovascular:     (+) Dyslipidemia, hypertension----. Taking blood thinners : . . . :. dysrhythmias a-fib, . pulmonary hypertension, Previous cardiac testing date:results:date: results:ECG reviewed date:10/2020 results:Atrial fibrillation date: results:         (-) CAD and CHF   METS/Exercise Tolerance:     Hematologic:     (+) Anemia, -      Musculoskeletal:         GI/Hepatic:        (-) GERD   Renal/Genitourinary:     (+) chronic renal disease, type: CRI,       Endo:     (+) type II DM Using insulin - using insulin pump .   (-) Type I DM   Psychiatric:         Infectious Disease:         Malignancy:         Other:                          Physical Exam      Airway   Mallampati: II  TM distance: >3 FB  Neck ROM: full    Dental   (+) missing and chipped  Comment: Poor dentition    Cardiovascular   Rhythm and rate: irregular      Pulmonary    breath sounds clear to auscultation            Lab Results   Component Value Date    WBC 10.1 07/30/2020    HGB 10.6 (L) 09/02/2020    HCT 29.2 (L) 07/30/2020     (H) 07/30/2020    CRP 18.8 (H) 07/30/2020    SED 47 (H) 07/30/2020     10/14/2020    POTASSIUM 3.7 10/27/2020    CHLORIDE 100 10/14/2020    CO2 30 (H) 10/14/2020    BUN 12 (A) 10/14/2020    CR 1.25 10/14/2020     (H) 10/14/2020    LLOYD 8.8 10/14/2020    PHOS 3.5 06/16/2020    MAG 2.0 05/21/2020    ALBUMIN 4.5 10/14/2020    PROTTOTAL 6.3 10/14/2020    ALT 5 10/14/2020    AST 14 10/14/2020    GGT 34 11/07/2019    ALKPHOS 108 10/14/2020    BILITOTAL 0.4 10/14/2020    INR 1.18 (H) 07/17/2020    TSH 4.75 (H) 08/10/2020    T4 1.11 08/10/2020       Preop Vitals  BP Readings from Last 3 Encounters:   10/27/20 (!) 184/106   10/01/20 122/78   09/02/20 (!) 154/78    Pulse Readings from Last 3 Encounters:   10/01/20 79   09/02/20 72   08/19/20 81      Resp Readings from Last 3 Encounters:   10/27/20 16   10/01/20 16   07/18/20 14    SpO2  "Readings from Last 3 Encounters:   10/27/20 98%   10/01/20 100%   08/19/20 98%      Temp Readings from Last 1 Encounters:   10/27/20 36.4  C (97.6  F) (Oral)    Ht Readings from Last 1 Encounters:   10/27/20 1.753 m (5' 9\")      Wt Readings from Last 1 Encounters:   10/27/20 68.9 kg (151 lb 14.4 oz)    Estimated body mass index is 22.43 kg/m  as calculated from the following:    Height as of this encounter: 1.753 m (5' 9\").    Weight as of this encounter: 68.9 kg (151 lb 14.4 oz).       Anesthesia Plan      History & Physical Review  History and physical reviewed and following examination; no interval change.    ASA Status:  4 .    NPO Status:  > 8 hours    Plan for General (ETT) with Intravenous and Propofol induction. Maintenance will be Balanced.    PONV prophylaxis:  Ondansetron (or other 5HT-3)         Postoperative Care  Postoperative pain management:  Multi-modal analgesia.      Consents  Anesthetic plan, risks, benefits and alternatives discussed with:  Patient..                 Jhon Jennings MD  "

## 2020-10-27 NOTE — OP NOTE
General Surgery Operative Note    PREOPERATIVE DIAGNOSIS:  Non-recurrent bilateral inguinal hernia without obstruction or gangrene [K40.20]    POSTOPERATIVE DIAGNOSIS: same    PROCEDURE:  LAPAROSCOPIC BILATERAL INGUINAL HERNIA REPAIR WITH MESH    ANESTHESIA:  General.    PREOPERATIVE MEDICATIONS:  Ancef iv    SURGEON:  Brian Garsia M.D.    ASSISTANT:  Lore Parker PA-C, Physician assistant first assistant was necessary during the performance of this procedure for expertise in patient positioning, prepping, draping, trocar placement, camera management, retraction and exposure, and suctioning.    INDICATIONS:  Patient presented to the office with symptomatic inguinal hernia.  Therapeutic options were thoroughly discussed.  It has been elected to proceed with a laparoscopic repair.  The potential risks of bleeding, infection, neurovascular injury to the vas deferens or testicle were all reviewed in detail.  Patient's questions were all answered.  He wishes to proceed.    DESCRIPTION OF PROCEDURE: The patient was taken to the operating suite and uneventfully endotracheally intubated. The abdomen and groin were prepped and draped in a sterile fashion. Pruett catheter was placed using sterile technique for bladder decompression. Surgeon initiated timeout was acknowledged. We made a curvilinear incision at the underside of the umbilicus and took this down through skin and subcutaneous tissue. We dissected down until the anterior rectus sheath was encountered. We incised along the fascia such that we were looking at the rectus muscle directly. The rectus muscle was retracted laterally and we inserted our dissecting balloon along the posterior rectus sheath toward the pubic bone. Once this was in place, we removed the obturator and inserted our camera. We then instilled air into the dissecting balloon and under direct visualization created our preperitoneal space. Dissecting balloon was then removed and our working  balloon was placed and positioned. Two 5 mm trocars were placed along the lower midline under direct laparoscopic visualization. We began our dissection on the right side. Using combination of sharp and blunt dissection, we created a plane behind the abdominal wall and the underlying peritoneum. This was done in a blunt and atraumatic fashion. The inferior epigastric vessels were visualized running along the underside of the abdominal wall.  We followed the epigastric vessels down until we were able to identify the internal ring. The spermatic cord was skeletonized.  Indirect hernia was present and reduced.  We continued our dissection until we had an excellent space in the preperitoneal area. We then placed a Progrip mesh within this space.  Once we were satisfied with our position, we turned our attention toward the other side.   Using a combination of sharp and blunt dissection, we were able to dissect out the spermatic cord. We created a space between the peritoneum and the anterior abdominal wall. Once we had dissected out the spermatic cord, we were able to identify the vas and the spermatic vessels. We skeletonized these structures such that there was no intervening cord lipoma or hernia sac. Large indirect hernia was present and reduced.  Once we were satisfied with the space that we had, we deployed another Progrip hernia mesh. Once we were satisfied with the position, the mesh was held in place and the insufflation was released. The mesh was held in excellent position under direct visualization until the insufflation was completely out of the preperitoneal space. We then removed the 5 mm working ports under direct visualization. We removed the working balloon. The fascia at the working port site was closed anteriorly using a 0 Vicryl stitch. Local anesthetic was injected at the incision sites.  Skin incisions were all closed with subcuticular 4-0 Vicryl stitch. Benzoin and Steri-Strips were applied. Needle  and sponge counts were correct. The patient tolerated this well and was taken from the operating room to the recovery room in stable condition.       ESTIMATED BLOOD LOSS: 5cc    Brian Garsia MD

## 2020-10-27 NOTE — OR NURSING
Pt has no urge to void. Pt bladder scanned for 18 ml urine.  Call to Dr. Ady washington to discharge without voiding.  Instructed patient to go to the ER if unable to void by 11:00 pm.  Pt voided twice on call and had a urine output of 850cc in the OR at the end of the case.

## 2020-10-29 ENCOUNTER — TELEPHONE (OUTPATIENT)
Dept: SURGERY | Facility: CLINIC | Age: 80
End: 2020-10-29

## 2020-10-29 NOTE — PROGRESS NOTES
"[facility]  MHT Alert - weight above upper limit    Called patient as his weight continues to be above upper parameter. He did not report any sx    Goal weight range:   140 - 146#  Current weights:  [employer] Tracker CHF Flowsheet My weight today is:   10/22/2020 147   10/23/2020 147   10/24/2020 147   10/25/2020 149   10/26/2020 149   10/27/2020 148   10/28/2020 148   10/29/2020 149     Current diuretic regimen:   Lasix 80mg/d(+KCL 20meq/d)   Spironolactone 25mg/d  Patient confirmed above - he started the spironolactone yesterday(Wed 10/28)    Patient reiterated what he reported on MHT that he has no sx and is feeling well. He added that he was at 150# \"and for some unknown reason I lost 10#. I felt great at 150 and have been concentrating on getting back to that weight.\" I explained to him the current weight range set for him. He repeated to me that he is feeling fine without any sx. Will update Ame Mejía.  Argelia Bacon, RN on 10/29/2020 at 11:58 AM      "

## 2020-10-29 NOTE — TELEPHONE ENCOUNTER
Procedure:  Laparoscopic bilateral inguinal hernia repair with mesh    Date:  10/27/2020    Surgeon:  Ady      Patient reporting bruised scrotum and swollen testicles.  Does cause some intermittent pain.    Informed him that this is normal symptom to experience after inguinal hernia surgery and should resolve over time.    Recommend ice to the area as well as elevation. Compression if available (boxer briefs, etc).    He verbalized understanding and is just glad to know that it isn't anything unusual.    He will call PRN.    Lyndsay Muro RN-BSN

## 2020-10-29 NOTE — TELEPHONE ENCOUNTER
Name of caller: Patient    Reason for Call:  Call back, post surgery questions    Surgeon:  Dr. Garsia     Recent Surgery:  Yes.    If yes, when & what type:  10/27/2020      LAPAROSCOPIC BILATERAL INGUINAL HERNIA REPAIR WITH MESH    Best phone number to reach pt at is: [phone]      Ok to leave a message with medical info? Yes.

## 2020-10-30 NOTE — PROGRESS NOTES
[facility] - CORE Clinic  MHT Alert - weight above parameters    Weight today: 150#    Patient is not reporting any sx. Will update Annette Mejía.  Argelia Bacon RN on 10/30/2020 at 10:11 AM

## 2020-11-02 ENCOUNTER — TELEPHONE (OUTPATIENT)
Dept: SURGERY | Facility: CLINIC | Age: 80
End: 2020-11-02

## 2020-11-02 ENCOUNTER — CARE COORDINATION (OUTPATIENT)
Dept: CARDIOLOGY | Facility: CLINIC | Age: 80
End: 2020-11-02

## 2020-11-02 DIAGNOSIS — G89.18 POSTOPERATIVE PAIN: ICD-10-CM

## 2020-11-02 RX ORDER — HYDROCODONE BITARTRATE AND ACETAMINOPHEN 5; 325 MG/1; MG/1
1 TABLET ORAL EVERY 4 HOURS PRN
Qty: 12 TABLET | Refills: 0 | Status: ON HOLD | OUTPATIENT
Start: 2020-11-02 | End: 2020-11-24

## 2020-11-02 NOTE — TELEPHONE ENCOUNTER
Patient returning call to discuss pain after an hour.    Patient can be reached at: [phone]  Ok to leave VM

## 2020-11-02 NOTE — TELEPHONE ENCOUNTER
Surgery:  Lap bilateral inguinal hernia repair   Date:  10/27/20  Surgery Team:  Dr. Garsia/JOVANY Parker PA-C    Called pt back to discuss his pain.  Pt reports as follows:     he is having pain to lower abdomen slightly above incisions, worsening with movement.  States now 7/10 today.  Was taking Norco 5/325 mg 1 tab every 4 hours.  States he is now out of them since yesterday.      States he does not take tylenol or ibuprofen.  Denies kidney or liver problems.      Using ice pack 3-4 times a day with some relief.     Having regular stools, a little loose.  States this is his normal since before surgery.      States swelling is mostly gone and bruising to penis and scrotum is improving.  Reassured pt that bruising will gradually improve over 1-2 weeks.  Pt denies any pain to penis or scrotum.    Advised pt to try tylenol 650 mg now and call back in 1 hour to see if it is helping. Pt agrees to plan.  Ann-Marie Yee RN on 11/2/2020 at 12:31 PM

## 2020-11-02 NOTE — TELEPHONE ENCOUNTER
Called pt back re: pain control.  Pt states he took 650 mg of tylenol but still has pain 7/10 with movement; states pain down to 1/10 at rest and only bothers him with movement.  States he does not feel that tylenol is giving him enough relief.      Asks for refill of pain meds. Wants it sent to [facility] on [address]    Will consult PA-C about request.  Ann-Marie Yee RN on 11/2/2020 at 1:52 PM

## 2020-11-02 NOTE — TELEPHONE ENCOUNTER
Called pt back with update from FORTUNATO Parker:  Refill of rx for Norco, 1 tab every 4 hours PRN.  May take with 325 mg tylenol.  Pt should also add 600 mg ibuprofen every 6-8 hours with food, then transition to alternating tylenol and ibuprofen after Norco is gone.    Reviewed update with pt as above.  Pt agreeable to plan.  Will call back as needed.   Ann-Marie Yee RN on 11/2/2020 at 3:31 PM

## 2020-11-02 NOTE — PROGRESS NOTES
[facility] - CORE Clinic Telemanagement  My Health Tracker HF Alert     Weight today: 149#    Weight goal: 145-150#  MyHealth Tracker CHF Flowsheet My weight today is:   10/18/2020 145   10/19/2020 146   10/20/2020 146   10/21/2020 146   10/22/2020 147   10/23/2020 147   10/24/2020 147   10/25/2020 149   10/26/2020 149   10/27/2020 148   10/28/2020 148   10/29/2020 149   10/30/2020 150   10/31/2020 149   11/1/2020 149   11/2/2020 149     Heart Failure sx: SOB     Daily diuretic plan:   Lasix 80mg daily  Spironolactone 25mg daily   KCL 20meq daily    Notes: Wt stable and within new goal. Flagged for SOB today. Called pt, he reports he just a very faint feeling of shortness of breath this am when he was getting out of bed. He said he has not had any more SOB the rest of the day. He stated he does not think it is anything to worry about. Denies increased swelling. Asked pt to report yes again tomorrow, if he notices that anymore today or tomorrow am again. He stated understanding. Will continue to monitor.       Future Appointments   Date Time Provider Department Center   12/9/2020 10:45 AM [facility]       BAYRON ChavarriaN, RN, CHFN  11/02/20 at 1:55 PM

## 2020-11-09 ENCOUNTER — CARE COORDINATION (OUTPATIENT)
Dept: CARDIOLOGY | Facility: CLINIC | Age: 80
End: 2020-11-09

## 2020-11-09 DIAGNOSIS — I50.33 ACUTE ON CHRONIC HEART FAILURE WITH PRESERVED EJECTION FRACTION (HFPEF) (H): Primary | ICD-10-CM

## 2020-11-09 RX ORDER — SPIRONOLACTONE 25 MG/1
25 TABLET ORAL DAILY
Refills: 1
Start: 2020-11-09 | End: 2021-01-26

## 2020-11-09 NOTE — PROGRESS NOTES
[facility]-CORE Clinic  My Health Tracker HF Alert     Background: Recent fluctuations in weight, goal range has been adjusted several times.  --10/23 at weight 147#, increase furosemide to 100mg daily x 2 days,  --10/28 spironolactone 25mg daily, decrease KCL to 20meq daily  --11/2 advised by surgery team to take scheduled ibuprofen    Today's home wt: 145#  Goal wt: 140-145#  Recent wts:  MyHealth Tracker CHF Flowsheet My weight today is:   10/28/2020 148   10/29/2020 149   10/30/2020 150   10/31/2020 149   11/1/2020 149   11/2/2020 149   11/4/2020 146   11/5/2020 147   11/6/2020 149   11/7/2020 149   11/8/2020 149   11/9/2020 145     Heart Failure sx: none reported  Current daily diuretic:    Furosemide 80mg daily   KCL 20meq daily   Spironolactone 25mg daily was started 10/28 (it was removed from med list 10/27 pre-surgery, I replaced this med back onto Epic med list today)    Plan: Weight fluctuations without significant change in HF symptoms.  --Need BMP and status update after joslyn start  --Consider sooner follow-up with Ame LEW     Called Tc and left detailed VM requesting call back with how he's feeling and to arrange lab appt.    Future Appointments   Date Time Provider Department Center   12/9/2020 10:45 AM Cony Julien DO [facility] PSA STEPH Mills RN BSN   1:38 PM 11/09/20

## 2020-11-10 NOTE — PROGRESS NOTES
[facility]CORE [facility]    Tc's weight is up 1# today and within his goal at 146#. No symptoms on survey.    Tc has not yet returned my call. See my note from 11/9.    Will ask scheduling to call Tc tomorrow to arrange BMP, CBC as ordered, if I've not heard from him.    Future Appointments   Date Time Provider Department Center   12/9/2020 10:45 AM Cony Julien DO [facility] PSA CLIN     Debi Mills RN BSN   11:24 AM 11/10/20

## 2020-11-11 NOTE — PROGRESS NOTES
[facility]CORE Clinic    Reviewed CORE provider availability--no ENRRIQUE FARRIS nor Ame Mejía PAC, nor Dr. Julien availability this week.     Tc agreed to an in clinic visit with Ame LEW 11/16. He told me he'd arrange labs (BMP and CBC) at primary office this week.     Debi Mills RN BSN   12:42 PM 11/11/20

## 2020-11-11 NOTE — PROGRESS NOTES
[facility]CORE Clinic  My Health Tracker HF Alert     Background: Recent fluctuations in weight, goal range has been adjusted several times.  --10/23 at weight 147#, increase furosemide to 100mg daily x 2 days,  --10/28 spironolactone 25mg daily, decrease KCL to 20meq daily  --11/2 advised by surgery team to take scheduled ibuprofen    Today's home wt: 147lbs Goal wt: 145-150lbs  Recent wts:      Heart Failure sx: shortness of breath and dizziness reported  Current daily diuretic:                Furosemide 80mg daily               KCL 20meq daily               Spironolactone 25mg daily was started 10/28 (it was removed from med list 10/27 pre-surgery, I replaced this med back onto Epic med list 11/9)     Plan: Weight fluctuations, symptoms reported today  --Need BMP and status update after joslyn start, left VM for pt 11/9 and today 11  --Consider sooner follow-up with Ame LEW     Future Appointments   Date Time Provider Department Center   12/9/2020 10:45 [facility]     Debi Mills RN BSN   10:06 AM 11/11/20      Tc returned my call. Of note, he said he has trouble checking his VM.    Breathing: dyspnea on exertion upon waking, lasts ~1hour, resolves with rest/time. No shortness of breath at rest.  Dizziness: later in day, positional, no fainting/near fainting  He has not felt significantly different since starting spironolactone.     I asked Tc to make a lab appointment at his primary clinic in next week, has orders for BMP and CBC. Reminder to RN board to watch for results next week.     FYI to Ame LEW.     Debi Mills RN BSN   11:19 AM 11/11/20      Faxed BMP and CBC orders to [facility] [phone].    Debi Mills RN BSN   11:28 AM 11/11/20

## 2020-11-11 NOTE — PROGRESS NOTES
Debi Abraham. Thank you for the detailed update, glad to see his weight hasn't fluctuated significantly post-operatively, but he does need to be assessed in person. Back on 10/23 I had recommended he see me within the following two weeks, but I think he had opted to delay this due to his surgery. If he is symptomatic, perhaps an urgent CORE MD appt would be best. Thanks.

## 2020-11-11 NOTE — PROGRESS NOTES
[facility]CORE Clinic    Clarified that Tc's primary MD is through [facility].    Faxed order for BMP and CBC to be drawn 11/12 or 11/13 to [phone].    Future Appointments   Date Time Provider Department Center   11/16/2020  9:10 AM Ame Mejía PA-C [facility] PSA CLIN   12/9/2020 10:45 AM Cony Julien DO [facility] PSA CL[facility]     Debi Mills RN BSN   4:21 PM 11/11/20

## 2020-11-13 ENCOUNTER — TRANSFERRED RECORDS (OUTPATIENT)
Dept: HEALTH INFORMATION MANAGEMENT | Facility: CLINIC | Age: 80
End: 2020-11-13

## 2020-11-16 ENCOUNTER — CARE COORDINATION (OUTPATIENT)
Dept: CARDIOLOGY | Facility: CLINIC | Age: 80
End: 2020-11-16

## 2020-11-16 ENCOUNTER — OFFICE VISIT (OUTPATIENT)
Dept: CARDIOLOGY | Facility: CLINIC | Age: 80
End: 2020-11-16
Payer: COMMERCIAL

## 2020-11-16 VITALS
BODY MASS INDEX: 22.56 KG/M2 | HEART RATE: 75 BPM | WEIGHT: 152.3 LBS | OXYGEN SATURATION: 100 % | DIASTOLIC BLOOD PRESSURE: 87 MMHG | SYSTOLIC BLOOD PRESSURE: 155 MMHG | HEIGHT: 69 IN

## 2020-11-16 DIAGNOSIS — I10 BENIGN ESSENTIAL HYPERTENSION: ICD-10-CM

## 2020-11-16 DIAGNOSIS — I48.91 ATRIAL FIBRILLATION, UNSPECIFIED TYPE (H): ICD-10-CM

## 2020-11-16 DIAGNOSIS — I50.33 ACUTE ON CHRONIC HEART FAILURE WITH PRESERVED EJECTION FRACTION (HFPEF) (H): Primary | ICD-10-CM

## 2020-11-16 DIAGNOSIS — I50.32 CHRONIC HEART FAILURE WITH PRESERVED EJECTION FRACTION (HFPEF) (H): Primary | ICD-10-CM

## 2020-11-16 DIAGNOSIS — E87.6 HYPOKALEMIA: ICD-10-CM

## 2020-11-16 PROCEDURE — 99215 OFFICE O/P EST HI 40 MIN: CPT | Performed by: PHYSICIAN ASSISTANT

## 2020-11-16 RX ORDER — VERAPAMIL HYDROCHLORIDE 120 MG/1
120 CAPSULE, EXTENDED RELEASE ORAL AT BEDTIME
Qty: 60 CAPSULE | Refills: 1 | Status: SHIPPED | OUTPATIENT
Start: 2020-11-16 | End: 2020-11-17

## 2020-11-16 RX ORDER — CARVEDILOL 6.25 MG/1
6.25 TABLET ORAL 2 TIMES DAILY WITH MEALS
Qty: 60 TABLET | Refills: 3 | Status: SHIPPED | OUTPATIENT
Start: 2020-11-16 | End: 2020-11-17

## 2020-11-16 ASSESSMENT — MIFFLIN-ST. JEOR: SCORE: 1386.21

## 2020-11-16 NOTE — PROGRESS NOTES
[facility]-CORE Clinic  Pt was to have lab results at PCP clinic per FORTUNATO Ortiz for visit today well controlled have not received lab results. Called [facility] to request BMP and CBC results that were to be drawn 11/12 or 11/13. Spoke with Milton, she will fax results over now. Will ask in clinic RN to watch for results and give to FORTUNATO Ortiz.     BAYRON ChavarriaN, RN, CHFN  11/16/20 at 10:36 AM

## 2020-11-16 NOTE — PATIENT INSTRUCTIONS
Today, we discussed the following:   - Results: Will call you with the results once we get them.   - Medication changes:  My nurse will let you know if we need to make any changes.   - Follow up: With Dr. Julien in December.   Me in CORE in 3 months.     Please, remember to continue the followin.  Weigh yourself daily. Call if your weight is up > than 2 pounds in one day, or 5 pounds in one week; if you feel more short of breath or have worsening swelling in your legs or abdomen.  2.  Eat a low sodium diet (less than 2,000mg or 2g daily). If you eat less salt, you will retain less fluid.   3.  Avoid alcohol, as this can worsen heart failure.   4.  Avoid NSAIDs as able (For example, Ibuprofen / aleve / advil / naprosen / diclofenac).    Please call CORE nurse for any questions or concerns Mon-Fri 8am-4pm:   [phone]  For concerns after hours:   [phone] option 2   Scheduling phone number:   [phone]    Thank you for visiting with us today.   Ame Mejía PA-C  ______________________________________________________________

## 2020-11-16 NOTE — PROGRESS NOTES
Cardiology Clinic Progress Note    Tc Garcia MRN# [id number]   YOB: 1940 Age: 80 year old         History of Presenting Illness:      Tc Garcia is a pleasant 80 year old patient of Dr. Bonilla () and Dr. Julien who presents today a [facility] clinic follow up visit.     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 Baseline creat ~ 1.5.  # Obesity  # Social - Lives with wife, Radha. Sedentary. High sodium diet, medication noncompliance, some concern over cognitive function     Recent admissions:  - Beginning of May 2020 with progressive dyspnea and due to recurrent (transudative) pleural effusion a left PleurX catheter was placed. He was diuresed with IV furosemide and given empiric abx therapy. Echo showed normal LVEF 55-60% with normal wall motion. The RV was moderately dilated with decreased systolic function and mild TR. He underwent a RHC during that stay that showed mild PH (PA pressure of 28 mmHg (51/15), mean RA of 4mmHg, and mildly elevated filling pressures with a wedge of 12mmHg (Vwave 16). PVR was 3.9 Wood units and Carlos Manuel CO/CI was 4.09/2.23. With vasodilator challenge, his PVR normalized to 1.22 wood units. Therefore, he was deemed to have pulmonary hypertension out of proportion to his left heart disease. He was started on Cialis 5mg daily but because this was not a formulated preparation, was switched to sildenafil 20 mg  "3 times daily and discharged home. Interestingly, he was not discharged on any diuretics. Discharge wt was 152 lbs. His admission was complicated by atrial flutter for which he was given amiodarone but because of prolongation in QTc, this was discontinued, and he was continued on his PTA verapamil.    Following discharge he developed symptomatic hypotension and via phone was told to stop the sildenafil. He then saw Dr. Chad gusman for PH evaluation a few days later. He reported hypertension with SBP >200mmHg and weight was up 12 lbs from discharge. Torsemide 10 mg daily, Losartan 25 mg daily, and KCL 40 mEq were all started. She recommended resuming sildenafil once he was felt to be euvolemic.       - Readmitted from 5/21 to 5/29 with uncontrolled diabetes and recurrent acute HFpEF, related to medication noncompliance. He again required IV lasix and was discharged on Lasix 40mg BID in place of the torsemide. Due to renal concerns, his hydrochlorothiazide and losartan were held. Fortunately, home health was arranged to help him manage his medications at home.      - 7/8-7/10 for re-accumulating pleural effusion, at which time IR evaluated his pleurex and was able to place to suction with 550ml fluid removed. He was also diuresed with good result.   - 7/10-7/18 for weakness/fall resulting in head contusion. He was found to have an empyema and received antibiotics. His pleurex was removed and a chest tube was placed. CT drained \"nearly all the fluid\" and was removed day prior to discharge.     Tc had a follow-up phone visit with Katlin on 8/11/20. His only complaint was of debilitating back pain for which he was seeing a spine specialist. He reported a stable weight from his most recent discharge, at 155 lbs. Labs showed a creatinine of 1.5, BUN 16, stable electrolytes, TSH slightly elevated but T4F WNL. She kept his Lasix the same at 40 mg BID and asked him to present to clinic for evaluation.  When I met him " about a week later, he was feeling pretty well, despite a recent weight increase from 154 to 161 pounds.  I increased his Lasix to 80 qAM / 40 qPM, enrolled him in MHT and ordered a zio monitor for assessment of rate control in AF. This showed rate controlled persistent atrial fibrillation. I also enrolled in him pulmonary rehab.      After that, he quickly lost 10 lbs with significant symptomatic improvement, and I instructed him to return to his original Lasix dosing of 40 BID. However his weight quickly increased again and I increased his regimen to 60 qAM and 40 qPM.     At our last visit in mid September, he reported feeling really well at a weight in the upper 140s.  However, he did have uncontrolled hypertension and upon further review, realized that he did not have his losartan at home.  His labs demonstrated slight ABBIE with a creatinine up to 1.66, which I suspected was in the case of uncontrolled hypertension, and I reordered his losartan.  After that, his blood pressures came under better control, and his renal function improved to a creatinine of 1.25.    He did really well for some time after that, however over the past month his weight has again fluctuated pretty significantly.  Initially, he rapidly lost 10 pounds, which he stated was somewhat intentional, and we adjusted his goal weight to be lower as he was feeling really well at that weight.  However after that, he started to creep back up again and gained the 10 pounds back.  He also described feeling completely well at the higher weight, and so we readjusted his goal weight back to the original 145 to 150 pounds.  I did increase his dose of furosemide and also added spironolactone to his regimen.  A few days after that, he had an abdominal hernia repair, and thankfully his weight stayed stable postoperatively.     I asked him to come in to see me today, as he reported some difficulty breathing on a recent my health tracker survey.  Today, he is  "feeling much better.  He states that symptoms only present for 1 day, and he has otherwise continued to feel well from a cardiovascular standpoint.  He denies exertional dyspnea outside of stair climbing, and has no orthopnea.  He has no leg swelling.  He appears very well compensated on exam.  His blood pressure is elevated again in the 150s systolic, and he states his blood pressures at home have been similar.  Although he does use a wrist cuff, which he states typically runs higher than what we obtain here in clinic.  I continue to have some concern over the accuracy of his medications, as he has had issues with compliance in the past, tells me \"he thinks\" he is currently on furosemide 80, and requests a new copy of his medication list (which we already give him at every appointment).  He had labs drawn at his PCPs office last week, however unfortunately we do not have those results available to us today.     Assessment/Plan:  1. Acute on chronic HFpEF / cor pulmonale, with recurrent admissions (four in past four months) due to multiple comorbidities including pleural effusion, medication noncompliance, poorly controlled diabetes and anemia.               -- His most recent echo from May 2020 shows preserved EF 55-60%, with normal wall motion. RV was mild to moderately dilated with mildly decreased RV function. He had a RHC during his hospital stay in May as below. He has since had recurrent hospitalizations in the setting of intermittent volume overload but also a recurrent pleural effusion/empyema.                  -- FC II on Lasix 80 daily + [place] 25 daily [[place] initiated 10/23].                 -- Enrolled in MHT with a goal range of 145-150 lbs. Currently 152 lbs clinic (148 lbs home).                  -- Counseled on sodium intake.                  -- Once we are back to implanting, he should be considered for a cardioMEMs.                 2. Pulmonary hypertension.              --RHC in May 2020 showed " "PA pressure of 28 mmHg (51/15), mean RA of 4mmHg, and mildly elevated filling pressures with a wedge of 12mmHg (Vwave 16). PVR was 3.9 Wood units and Carlos Manuel CO/CI was 4.09/2.23. With vasodilator challenge, his PVR normalized to 1.22 wood units. Therefore, he was deemed to have pulmonary hypertension out of proportion to his left heart disease. Wt at that time was 150 lbs.               --As per Dr. Bonilla's recommendations, once he is euvolemic, our plan is to potentially reintroduce sildenafil. However, thus far, he has not proven to be a good or stable candidate for this.               -- He wishes to postpone pulmonary rehab to 2021 due to risk of coronavirus exposure.      3. Paroxysmal atrial fibrillation/flutter.              -- Recent zio monitor shows adequate rate control on verapamil.               --He is on anticoagulation with apixaban 2.5mg BID due to age and renal function.     4. Hypertension. Elevated.    -- Will plan to increase his losartan from 50 to 100 mg daily, however I would like to see what his labs look like prior to this, as none have been obtained since starting [facility].  We will have my nurse inquire about pending lab results from [facility] physicians again.     5. Anemia of chronic disease.                 -- Most recent Hgb 10.6. Has received Fe infusions in the past with symptomatic improvement.      6. ABBIE - most recent creat 1.25 in October. Creat over the past year has been variable, between 1.3 - 2.0.     7. Hx of medication confusion and non-compliance.       Follow-up:  -Dr. Julien, as scheduled in 3 weeks.  -Me in the core clinic in 3 months.  Labs should be done at PCPs office 2 days prior.         Review of Systems:     12-pt ROS is negative except for as noted in the HPI.          Physical Exam:     Vitals: BP (!) 155/87 (BP Location: Left arm, Patient Position: Sitting, Cuff Size: Adult Regular)   Pulse 75   Ht 1.753 m (5' 9\")   Wt 69.1 kg (152 lb 4.8 oz)   SpO2 " 100%   BMI 22.49 kg/m    Wt Readings from Last 10 Encounters:   11/16/20 69.1 kg (152 lb 4.8 oz)   10/27/20 68.9 kg (151 lb 14.4 oz)   10/01/20 68 kg (150 lb)   09/02/20 68.3 kg (150 lb 8 oz)   08/19/20 73 kg (161 lb)   08/11/20 70.3 kg (155 lb)   07/15/20 71.1 kg (156 lb 12 oz)   07/10/20 67.5 kg (148 lb 14.4 oz)   07/08/20 72.2 kg (159 lb 3.2 oz)   06/16/20 70.8 kg (156 lb)       Constitutional:  Patient is pleasant, alert, cooperative, and in NAD.  HEENT:  NCAT. PERRLA. EOM's intact.   Neck:  CVP appears normal. No carotid bruits.   Pulmonary: Normal respiratory effort. Diffusely reduced BS, no crackles or wheezes.  Cardiac: RRR, normal S1/S2, no S3/S4, no murmur or rub.   Abdomen:  Non-tender abdomen, no hepatosplenomegaly appreciated.   Vascular: Pulses in the upper and lower extremities are 2+ and equal bilaterally.  Extremities: No edema, erythema, cyanosis or tenderness appreciated.  Skin:  No rashes or lesions appreciated.   Neurological:  No gross motor or sensory deficits.   Psych: Appropriate affect.        Data:     Labs reviewed:  Recent Labs   Lab Test 09/10/20  1359 09/02/20  1044 08/10/20  1027 07/08/20  0944 05/13/20  0553 05/13/20  0553 05/12/20  0541 05/11/20  0542 04/25/20  1228   LDL  --   --   --   --   --  43  --   --   --    HDL  --   --   --   --   --  60  --   --   --    NHDL  --   --   --   --   --  56  --   --   --    CHOL  --   --   --   --   --  116  --   --   --    TRIG  --   --   --   --   --  65  --   --   --    TSH  --   --  4.75*  --   --   --  3.47  --  4.39*   NTBNP 2,611* 2,024*  --  2,210*   < >  --   --   --   --    IRON  --   --   --   --   --   --   --  16*  --    FEB  --   --   --   --   --   --   --  161*  --    IRONSAT  --   --   --   --   --   --   --  10*  --    ERNESTO  --   --   --   --   --   --   --  142  --     < > = values in this interval not displayed.       Lab Results   Component Value Date    WBC 10.1 07/30/2020    RBC 3.28 (L) 07/30/2020    HGB 10.6 (L)  09/02/2020    HCT 29.2 (L) 07/30/2020    MCV 89 07/30/2020    MCH 26.5 07/30/2020    MCHC 29.8 (L) 07/30/2020    RDW 16.9 (H) 07/30/2020     (H) 07/30/2020       Lab Results   Component Value Date     10/14/2020    POTASSIUM 3.7 10/27/2020    CHLORIDE 100 10/14/2020    CO2 30 (H) 10/14/2020    ANIONGAP 13.9 09/10/2020     (H) 10/14/2020    BUN 12 (A) 10/14/2020    CR 1.25 10/14/2020    GFRESTIMATED 54 10/14/2020    GFRESTBLACK 62 10/14/2020    LLOYD 8.8 10/14/2020      Lab Results   Component Value Date    AST 14 10/14/2020    ALT 5 10/14/2020       Lab Results   Component Value Date    A1C 7.4 (H) 07/08/2020       Lab Results   Component Value Date    INR 1.18 (H) 07/17/2020    INR 1.49 (H) 05/10/2020           Problem List:     Patient Active Problem List   Diagnosis     DM type 2, Hgb A1C 8.2 on 4/25/20     Mixed hyperlipidemia     Essential hypertension     Pain in limb     Plantar fascial fibromatosis     HYPERLIPIDEMIA LDL GOAL <100     Hemothorax on left     Recurrent Left Pleural effusion -- S/P Pleurex Cath 5/12/20     ABBIE (acute kidney injury) (H)     Acute respiratory failure with hypoxia (H)     Pulmonary hypertension (H)     CRF (chronic renal failure), stage 3      Anemia of chronic disease     Atrial fibrillation/flutter -- on Eliquis and Amiodarone     DKA (diabetic ketoacidoses) (H)     Anemia in CKD (chronic kidney disease)     Dyspnea     SOB (shortness of breath)     Non-recurrent bilateral inguinal hernia without obstruction or gangrene           Medications:     Current Outpatient Medications   Medication Sig Dispense Refill     albuterol (PROAIR HFA/PROVENTIL HFA/VENTOLIN HFA) 108 (90 Base) MCG/ACT inhaler Inhale 2 puffs into the lungs every 4 hours as needed for shortness of breath / dyspnea or wheezing Inhale 2 puffs every 4 to 6 hours as needed.       apixaban ANTICOAGULANT (ELIQUIS) 2.5 MG tablet Take 1 tablet (2.5 mg) by mouth 2 times daily       furosemide (LASIX) 80 MG  tablet Take 1 tablet (80 mg) by mouth daily 90 tablet 3     glucagon 1 MG kit 1 mg as needed for low blood sugar       HYDROcodone-acetaminophen (NORCO) 5-325 MG tablet Take 1 tablet by mouth every 4 hours as needed for moderate to severe pain 12 tablet 0     insulin aspart (NOVOLOG PEN) 100 UNIT/ML pen Inject 8 units subcutaneous with breakfast, 8 units subcutaneous with lunch and 4 units with supper. 3 mL 0     insulin glargine (LANTUS PEN) 100 UNIT/ML pen Inject 14 Units Subcutaneous every morning       losartan (COZAAR) 50 MG tablet Take 1 tablet (50 mg) by mouth daily 90 tablet 3     LOVASTATIN 20 MG PO TABS 1 TABLET DAILY AT DINNER 90 Tab 0     nystatin (MYCOSTATIN) 631396 UNIT/GM external cream Apply topically 2 times daily as needed        potassium chloride ER (KLOR-CON M) 10 MEQ CR tablet Take 2 tablets (20 mEq) by mouth daily 360 tablet 1     senna-docusate (SENOKOT-S/PERICOLACE) 8.6-50 MG tablet Take 1-2 tablets by mouth 2 times daily as needed for constipation 20 tablet 0     spironolactone (ALDACTONE) 25 MG tablet Take 1 tablet (25 mg) by mouth daily  1     verapamil ER (VERELAN) 240 MG 24 hr capsule Take 1 capsule (240 mg) by mouth At Bedtime             Past Medical History:     Past Medical History:   Diagnosis Date     Anemia      Anemia of chronic disease 5/14/2020     Back pain      CKD (chronic kidney disease) stage 3, GFR 30-59 ml/min      CRF (chronic renal failure), stage 3  5/14/2020     Fall 11/2019    suffered multiple left rib fractures, C3 and T2 fractures     Mixed hyperlipidemia 7/7/2004     Paroxysmal atrial fibrillation (H)      Personal history of colonic polyps 1972    gets colonoscopy every five years, due in 2006     Pleural effusion on left 11/2019    after multiple rib fractures     Pulmonary hypertension (H) 5/10/2020    Added automatically from request for surgery [id number]     Recurrent Left Pleural effusion -- S/P Pleurex Cath 5/12/20 12/30/2019     Rosacea 1989     Type II  or unspecified type diabetes mellitus without mention of complication, not stated as uncontrolled      Unspecified essential hypertension      Past Surgical History:   Procedure Laterality Date     ANESTHESIA CARDIOVERSION N/A 2/3/2020    Procedure: ANESTHESIA, FOR CARDIOVERSION;  Surgeon: GENERIC ANESTHESIA PROVIDER;  Location:  OR     CV RIGHT HEART CATH N/A 2020    Procedure: Right Heart Cath;  Surgeon: Senthil Silva MD;  Location:  HEART CARDIAC CATH LAB     HC REMOVE TONSILS/ADENOIDS,<13 Y/O      T & A <11y.o.     IR CHEST TUBE DRAIN TUNNELED LEFT  2020     IR CHEST TUBE PLACEMENT NON-TUNNELLED LEFT  2020     IR CHEST TUBE REMOVAL NON TUNNELED LEFT  2020     IR CHEST TUBE REMOVAL TUNNELED LEFT  2020     LAPAROSCOPIC HERNIORRHAPHY INGUINAL BILATERAL Bilateral 10/27/2020    Procedure: LAPAROSCOPIC BILATERAL INGUINAL HERNIA REPAIR WITH MESH;  Surgeon: Brian Garsia MD;  Location:  OR     Family History   Problem Relation Age of Onset     Family History Negative Mother          age 71     Family History Negative Father          age 70     Diabetes Brother         alive age 77     Diabetes Brother         alive age 76     Family History Negative Brother              Family History Negative Brother              Diabetes Sister         alive age74     Family History Negative Sister          age 86     Heart Disease Sister              Family History Negative Sister              Family History Negative Sister              Diabetes Sister      Diabetes Sister      Diabetes Brother      Diabetes Brother      Social History     Socioeconomic History     Marital status:      Spouse name: Lianna     Number of children: 4     Years of education: 16     Highest education level: Not on file   Occupational History     Occupation: COURRIER     Employer: [employer]    Social Needs      Financial resource strain: Not on file     Food insecurity     Worry: Not on file     Inability: Not on file     Transportation needs     Medical: Not on file     Non-medical: Not on file   Tobacco Use     Smoking status: Former Smoker     Packs/day: 0.20     Years: 40.00     Pack years: 8.00     Types: Cigarettes     Start date:      Quit date: 2008     Years since quittin.8     Smokeless tobacco: Never Used     Tobacco comment: occasional   Substance and Sexual Activity     Alcohol use: Not Currently     Alcohol/week: 35.0 standard drinks     Frequency: Never     Drug use: Not Currently     Sexual activity: Not on file   Lifestyle     Physical activity     Days per week: Not on file     Minutes per session: Not on file     Stress: Not on file   Relationships     Social connections     Talks on phone: Not on file     Gets together: Not on file     Attends Protestant service: Not on file     Active member of club or organization: Not on file     Attends meetings of clubs or organizations: Not on file     Relationship status: Not on file     Intimate partner violence     Fear of current or ex partner: Not on file     Emotionally abused: Not on file     Physically abused: Not on file     Forced sexual activity: Not on file   Other Topics Concern     Parent/sibling w/ CABG, MI or angioplasty before 65F 55M? Not Asked   Social History Narrative     Not on file           Allergies:   No known drug allergies      Ame Mejía PA-C  [facility]  Pager: [phone]

## 2020-11-16 NOTE — PROGRESS NOTES
[facility]  Per in clinic RN, copy of labs received and given to FORTUNATO Ortiz and copy sent to scan.     Dilma Rene, BSN, RN, CHFN  11/16/20 at 11:44 AM

## 2020-11-16 NOTE — LETTER
11/16/2020    Charlie Finch MD  [address]  [facility] [address]    RE: Tc Garcia       Dear Colleague,    I had the pleasure of seeing Tc Garcia in the [facility].    Cardiology Clinic Progress Note    Tc Garcia MRN# [id number]   YOB: 1940 Age: 80 year old         History of Presenting Illness:      Tc Garcia is a pleasant 80 year old patient of Dr. Bonilla (PH) and Dr. Julien who presents today a CORE clinic follow up visit.     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 Baseline creat ~ 1.5.  # Obesity  # Social - Lives with wife, Radha. Sedentary. High sodium diet, medication noncompliance, some concern over cognitive function     Recent admissions:  - Beginning of May 2020 with progressive dyspnea and due to recurrent (transudative) pleural effusion a left PleurX catheter was placed. He was diuresed with IV furosemide and given empiric abx therapy. Echo showed normal LVEF 55-60% with normal wall motion. The RV was moderately dilated with decreased systolic function and mild TR. He underwent a RHC during that stay that showed mild PH (PA pressure of 28 mmHg (51/15), mean RA of 4mmHg, and mildly elevated filling pressures with a wedge of 12mmHg (Vwave 16). PVR was 3.9 Wood units and Carlos Manuel CO/CI was 4.09/2.23. With vasodilator challenge, his PVR normalized to 1.22 wood units.  "Therefore, he was deemed to have pulmonary hypertension out of proportion to his left heart disease. He was started on Cialis 5mg daily but because this was not a formulated preparation, was switched to sildenafil 20 mg 3 times daily and discharged home. Interestingly, he was not discharged on any diuretics. Discharge wt was 152 lbs. His admission was complicated by atrial flutter for which he was given amiodarone but because of prolongation in QTc, this was discontinued, and he was continued on his PTA verapamil.    Following discharge he developed symptomatic hypotension and via phone was told to stop the sildenafil. He then saw Dr. Chad gusman for PH evaluation a few days later. He reported hypertension with SBP >200mmHg and weight was up 12 lbs from discharge. Torsemide 10 mg daily, Losartan 25 mg daily, and KCL 40 mEq were all started. She recommended resuming sildenafil once he was felt to be euvolemic.       - Readmitted from 5/21 to 5/29 with uncontrolled diabetes and recurrent acute HFpEF, related to medication noncompliance. He again required IV lasix and was discharged on Lasix 40mg BID in place of the torsemide. Due to renal concerns, his hydrochlorothiazide and losartan were held. Fortunately, home health was arranged to help him manage his medications at home.      - 7/8-7/10 for re-accumulating pleural effusion, at which time IR evaluated his pleurex and was able to place to suction with 550ml fluid removed. He was also diuresed with good result.   - 7/10-7/18 for weakness/fall resulting in head contusion. He was found to have an empyema and received antibiotics. His pleurex was removed and a chest tube was placed. CT drained \"nearly all the fluid\" and was removed day prior to discharge.     Tc had a follow-up phone visit with Katlin on 8/11/20. His only complaint was of debilitating back pain for which he was seeing a spine specialist. He reported a stable weight from his most recent discharge, " at 155 lbs. Labs showed a creatinine of 1.5, BUN 16, stable electrolytes, TSH slightly elevated but T4F WNL. She kept his Lasix the same at 40 mg BID and asked him to present to clinic for evaluation.  When I met him about a week later, he was feeling pretty well, despite a recent weight increase from 154 to 161 pounds.  I increased his Lasix to 80 qAM / 40 qPM, enrolled him in MHT and ordered a zio monitor for assessment of rate control in AF. This showed rate controlled persistent atrial fibrillation. I also enrolled in him pulmonary rehab.      After that, he quickly lost 10 lbs with significant symptomatic improvement, and I instructed him to return to his original Lasix dosing of 40 BID. However his weight quickly increased again and I increased his regimen to 60 qAM and 40 qPM.     At our last visit in mid September, he reported feeling really well at a weight in the upper 140s.  However, he did have uncontrolled hypertension and upon further review, realized that he did not have his losartan at home.  His labs demonstrated slight ABBIE with a creatinine up to 1.66, which I suspected was in the case of uncontrolled hypertension, and I reordered his losartan.  After that, his blood pressures came under better control, and his renal function improved to a creatinine of 1.25.    He did really well for some time after that, however over the past month his weight has again fluctuated pretty significantly.  Initially, he rapidly lost 10 pounds, which he stated was somewhat intentional, and we adjusted his goal weight to be lower as he was feeling really well at that weight.  However after that, he started to creep back up again and gained the 10 pounds back.  He also described feeling completely well at the higher weight, and so we readjusted his goal weight back to the original 145 to 150 pounds.  I did increase his dose of furosemide and also added spironolactone to his regimen.  A few days after that, he had an  "abdominal hernia repair, and thankfully his weight stayed stable postoperatively.     I asked him to come in to see me today, as he reported some difficulty breathing on a recent my health tracker survey.  Today, he is feeling much better.  He states that symptoms only present for 1 day, and he has otherwise continued to feel well from a cardiovascular standpoint.  He denies exertional dyspnea outside of stair climbing, and has no orthopnea.  He has no leg swelling.  He appears very well compensated on exam.  His blood pressure is elevated again in the 150s systolic, and he states his blood pressures at home have been similar.  Although he does use a wrist cuff, which he states typically runs higher than what we obtain here in clinic.  I continue to have some concern over the accuracy of his medications, as he has had issues with compliance in the past, tells me \"he thinks\" he is currently on furosemide 80, and requests a new copy of his medication list (which we already give him at every appointment).  He had labs drawn at his PCPs office last week, however unfortunately we do not have those results available to us today.     Assessment/Plan:  1. Acute on chronic HFpEF / cor pulmonale, with recurrent admissions (four in past four months) due to multiple comorbidities including pleural effusion, medication noncompliance, poorly controlled diabetes and anemia.               -- His most recent echo from May 2020 shows preserved EF 55-60%, with normal wall motion. RV was mild to moderately dilated with mildly decreased RV function. He had a RHC during his hospital stay in May as below. He has since had recurrent hospitalizations in the setting of intermittent volume overload but also a recurrent pleural effusion/empyema.                  -- FC II on Lasix 80 daily + Abraham 25 daily [Abraham initiated 10/23].                 -- Enrolled in MHT with a goal range of 145-150 lbs. Currently 152 lbs clinic (148 lbs home).       "            -- Counseled on sodium intake.                  -- Once we are back to implanting, he should be considered for a cardioMEMs.                 2. Pulmonary hypertension.              --RHC in May 2020 showed PA pressure of 28 mmHg (51/15), mean RA of 4mmHg, and mildly elevated filling pressures with a wedge of 12mmHg (Vwave 16). PVR was 3.9 Wood units and Carlos Manuel CO/CI was 4.09/2.23. With vasodilator challenge, his PVR normalized to 1.22 wood units. Therefore, he was deemed to have pulmonary hypertension out of proportion to his left heart disease. Wt at that time was 150 lbs.               --As per Dr. Bonilla's recommendations, once he is euvolemic, our plan is to potentially reintroduce sildenafil. However, thus far, he has not proven to be a good or stable candidate for this.               -- He wishes to postpone pulmonary rehab to 2021 due to risk of coronavirus exposure.      3. Paroxysmal atrial fibrillation/flutter.              -- Recent zio monitor shows adequate rate control on verapamil.               --He is on anticoagulation with apixaban 2.5mg BID due to age and renal function.     4. Hypertension. Elevated.    -- Will plan to increase his losartan from 50 to 100 mg daily, however I would like to see what his labs look like prior to this, as none have been obtained since starting Gary.  We will have my nurse inquire about pending lab results from [facility] family physicians again.     5. Anemia of chronic disease.                 -- Most recent Hgb 10.6. Has received Fe infusions in the past with symptomatic improvement.      6. ABBIE - most recent creat 1.25 in October. Creat over the past year has been variable, between 1.3 - 2.0.     7. Hx of medication confusion and non-compliance.       Follow-up:  -Dr. Julien, as scheduled in 3 weeks.  -Me in the core clinic in 3 months.  Labs should be done at PCPs office 2 days prior.         Review of Systems:     12-pt ROS is negative except for as  "noted in the HPI.          Physical Exam:     Vitals: BP (!) 155/87 (BP Location: Left arm, Patient Position: Sitting, Cuff Size: Adult Regular)   Pulse 75   Ht 1.753 m (5' 9\")   Wt 69.1 kg (152 lb 4.8 oz)   SpO2 100%   BMI 22.49 kg/m    Wt Readings from Last 10 Encounters:   11/16/20 69.1 kg (152 lb 4.8 oz)   10/27/20 68.9 kg (151 lb 14.4 oz)   10/01/20 68 kg (150 lb)   09/02/20 68.3 kg (150 lb 8 oz)   08/19/20 73 kg (161 lb)   08/11/20 70.3 kg (155 lb)   07/15/20 71.1 kg (156 lb 12 oz)   07/10/20 67.5 kg (148 lb 14.4 oz)   07/08/20 72.2 kg (159 lb 3.2 oz)   06/16/20 70.8 kg (156 lb)       Constitutional:  Patient is pleasant, alert, cooperative, and in NAD.  HEENT:  NCAT. PERRLA. EOM's intact.   Neck:  CVP appears normal. No carotid bruits.   Pulmonary: Normal respiratory effort. Diffusely reduced BS, no crackles or wheezes.  Cardiac: RRR, normal S1/S2, no S3/S4, no murmur or rub.   Abdomen:  Non-tender abdomen, no hepatosplenomegaly appreciated.   Vascular: Pulses in the upper and lower extremities are 2+ and equal bilaterally.  Extremities: No edema, erythema, cyanosis or tenderness appreciated.  Skin:  No rashes or lesions appreciated.   Neurological:  No gross motor or sensory deficits.   Psych: Appropriate affect.        Data:     Labs reviewed:  Recent Labs   Lab Test 09/10/20  1359 09/02/20  1044 08/10/20  1027 07/08/20  0944 05/13/20  0553 05/13/20  0553 05/12/20  0541 05/11/20  0542 04/25/20  1228   LDL  --   --   --   --   --  43  --   --   --    HDL  --   --   --   --   --  60  --   --   --    NHDL  --   --   --   --   --  56  --   --   --    CHOL  --   --   --   --   --  116  --   --   --    TRIG  --   --   --   --   --  65  --   --   --    TSH  --   --  4.75*  --   --   --  3.47  --  4.39*   NTBNP 2,611* 2,024*  --  2,210*   < >  --   --   --   --    IRON  --   --   --   --   --   --   --  16*  --    FEB  --   --   --   --   --   --   --  161*  --    IRONSAT  --   --   --   --   --   --   --  10* "  --    ERNESTO  --   --   --   --   --   --   --  142  --     < > = values in this interval not displayed.       Lab Results   Component Value Date    WBC 10.1 07/30/2020    RBC 3.28 (L) 07/30/2020    HGB 10.6 (L) 09/02/2020    HCT 29.2 (L) 07/30/2020    MCV 89 07/30/2020    MCH 26.5 07/30/2020    MCHC 29.8 (L) 07/30/2020    RDW 16.9 (H) 07/30/2020     (H) 07/30/2020       Lab Results   Component Value Date     10/14/2020    POTASSIUM 3.7 10/27/2020    CHLORIDE 100 10/14/2020    CO2 30 (H) 10/14/2020    ANIONGAP 13.9 09/10/2020     (H) 10/14/2020    BUN 12 (A) 10/14/2020    CR 1.25 10/14/2020    GFRESTIMATED 54 10/14/2020    GFRESTBLACK 62 10/14/2020    LLOYD 8.8 10/14/2020      Lab Results   Component Value Date    AST 14 10/14/2020    ALT 5 10/14/2020       Lab Results   Component Value Date    A1C 7.4 (H) 07/08/2020       Lab Results   Component Value Date    INR 1.18 (H) 07/17/2020    INR 1.49 (H) 05/10/2020           Problem List:     Patient Active Problem List   Diagnosis     DM type 2, Hgb A1C 8.2 on 4/25/20     Mixed hyperlipidemia     Essential hypertension     Pain in limb     Plantar fascial fibromatosis     HYPERLIPIDEMIA LDL GOAL <100     Hemothorax on left     Recurrent Left Pleural effusion -- S/P Pleurex Cath 5/12/20     ABBIE (acute kidney injury) (H)     Acute respiratory failure with hypoxia (H)     Pulmonary hypertension (H)     CRF (chronic renal failure), stage 3      Anemia of chronic disease     Atrial fibrillation/flutter -- on Eliquis and Amiodarone     DKA (diabetic ketoacidoses) (H)     Anemia in CKD (chronic kidney disease)     Dyspnea     SOB (shortness of breath)     Non-recurrent bilateral inguinal hernia without obstruction or gangrene           Medications:     Current Outpatient Medications   Medication Sig Dispense Refill     albuterol (PROAIR HFA/PROVENTIL HFA/VENTOLIN HFA) 108 (90 Base) MCG/ACT inhaler Inhale 2 puffs into the lungs every 4 hours as needed for  shortness of breath / dyspnea or wheezing Inhale 2 puffs every 4 to 6 hours as needed.       apixaban ANTICOAGULANT (ELIQUIS) 2.5 MG tablet Take 1 tablet (2.5 mg) by mouth 2 times daily       furosemide (LASIX) 80 MG tablet Take 1 tablet (80 mg) by mouth daily 90 tablet 3     glucagon 1 MG kit 1 mg as needed for low blood sugar       HYDROcodone-acetaminophen (NORCO) 5-325 MG tablet Take 1 tablet by mouth every 4 hours as needed for moderate to severe pain 12 tablet 0     insulin aspart (NOVOLOG PEN) 100 UNIT/ML pen Inject 8 units subcutaneous with breakfast, 8 units subcutaneous with lunch and 4 units with supper. 3 mL 0     insulin glargine (LANTUS PEN) 100 UNIT/ML pen Inject 14 Units Subcutaneous every morning       losartan (COZAAR) 50 MG tablet Take 1 tablet (50 mg) by mouth daily 90 tablet 3     LOVASTATIN 20 MG PO TABS 1 TABLET DAILY AT DINNER 90 Tab 0     nystatin (MYCOSTATIN) 631473 UNIT/GM external cream Apply topically 2 times daily as needed        potassium chloride ER (KLOR-CON M) 10 MEQ CR tablet Take 2 tablets (20 mEq) by mouth daily 360 tablet 1     senna-docusate (SENOKOT-S/PERICOLACE) 8.6-50 MG tablet Take 1-2 tablets by mouth 2 times daily as needed for constipation 20 tablet 0     spironolactone (ALDACTONE) 25 MG tablet Take 1 tablet (25 mg) by mouth daily  1     verapamil ER (VERELAN) 240 MG 24 hr capsule Take 1 capsule (240 mg) by mouth At Bedtime             Past Medical History:     Past Medical History:   Diagnosis Date     Anemia      Anemia of chronic disease 5/14/2020     Back pain      CKD (chronic kidney disease) stage 3, GFR 30-59 ml/min      CRF (chronic renal failure), stage 3  5/14/2020     Fall 11/2019    suffered multiple left rib fractures, C3 and T2 fractures     Mixed hyperlipidemia 7/7/2004     Paroxysmal atrial fibrillation (H)      Personal history of colonic polyps 1972    gets colonoscopy every five years, due in 2006     Pleural effusion on left 11/2019    after multiple  rib fractures     Pulmonary hypertension (H) 5/10/2020    Added automatically from request for surgery [id number]     Recurrent Left Pleural effusion -- S/P Pleurex Cath 2019     Rosacea      Type II or unspecified type diabetes mellitus without mention of complication, not stated as uncontrolled      Unspecified essential hypertension      Past Surgical History:   Procedure Laterality Date     ANESTHESIA CARDIOVERSION N/A 2/3/2020    Procedure: ANESTHESIA, FOR CARDIOVERSION;  Surgeon: GENERIC ANESTHESIA PROVIDER;  Location:  OR     CV RIGHT HEART CATH N/A 2020    Procedure: Right Heart Cath;  Surgeon: Senthil Silva MD;  Location:  HEART CARDIAC CATH LAB     HC REMOVE TONSILS/ADENOIDS,<11 Y/O      T & A <11y.o.     IR CHEST TUBE DRAIN TUNNELED LEFT  2020     IR CHEST TUBE PLACEMENT NON-TUNNELLED LEFT  2020     IR CHEST TUBE REMOVAL NON TUNNELED LEFT  2020     IR CHEST TUBE REMOVAL TUNNELED LEFT  2020     LAPAROSCOPIC HERNIORRHAPHY INGUINAL BILATERAL Bilateral 10/27/2020    Procedure: LAPAROSCOPIC BILATERAL INGUINAL HERNIA REPAIR WITH MESH;  Surgeon: Brian Garsia MD;  Location:  OR     Family History   Problem Relation Age of Onset     Family History Negative Mother          age 71     Family History Negative Father          age 70     Diabetes Brother         alive age 77     Diabetes Brother         alive age 76     Family History Negative Brother              Family History Negative Brother              Diabetes Sister         alive age76     Family History Negative Sister          age 86     Heart Disease Sister              Family History Negative Sister              Family History Negative Sister              Diabetes Sister      Diabetes Sister      Diabetes Brother      Diabetes Brother      Social History     Socioeconomic History     Marital status:      Spouse  name: Lianna     Number of children: 4     Years of education: 16     Highest education level: Not on file   Occupational History     Occupation: NANCI     Employer: [employer]    Social Needs     Financial resource strain: Not on file     Food insecurity     Worry: Not on file     Inability: Not on file     Transportation needs     Medical: Not on file     Non-medical: Not on file   Tobacco Use     Smoking status: Former Smoker     Packs/day: 0.20     Years: 40.00     Pack years: 8.00     Types: Cigarettes     Start date:      Quit date: 2008     Years since quittin.8     Smokeless tobacco: Never Used     Tobacco comment: occasional   Substance and Sexual Activity     Alcohol use: Not Currently     Alcohol/week: 35.0 standard drinks     Frequency: Never     Drug use: Not Currently     Sexual activity: Not on file   Lifestyle     Physical activity     Days per week: Not on file     Minutes per session: Not on file     Stress: Not on file   Relationships     Social connections     Talks on phone: Not on file     Gets together: Not on file     Attends Catholic service: Not on file     Active member of club or organization: Not on file     Attends meetings of clubs or organizations: Not on file     Relationship status: Not on file     Intimate partner violence     Fear of current or ex partner: Not on file     Emotionally abused: Not on file     Physically abused: Not on file     Forced sexual activity: Not on file   Other Topics Concern     Parent/sibling w/ CABG, MI or angioplasty before 65F 55M? Not Asked   Social History Narrative     Not on file           Allergies:   No known drug allergies      Ame Mejía PA-C  [facility]  Pager: [phone]        Thank you for allowing me to participate in the care of your patient.    Sincerely,     Ame Mejía PA-C     [facility]

## 2020-11-16 NOTE — PROGRESS NOTES
Rec'd pt's lab results after he left. WBC is mildly elevated at 12, Hgb is stable at 10.6, creatinine is 1.2, GFR 55, and Potassium 5.7.     I suspect he never reduced his potassium supplement when Spironolactone was started.   Please review his current medication list over the phone, and then instruct him to stop potassium completely.   For his blood pressure, I would like him to start Carvedilol 6.25 mg BID. Please have him come in in one week for a RN BP check, BMP lab draw and a 48h holter monitor placement.     I also note that he is scheduled for a telephone visit with me on 12/18. That should be changed to a CORE virtual visit, and can be pushed out to two weeks after his visit with Dr. Julien on the 9th. Thank you!

## 2020-11-16 NOTE — PROGRESS NOTES
Called pt, reviewed lab results. Reviewed medication list. Current medication list pt has does not have Eliquis on it, but pt confirms he is taking it. Reviewed recommendations to stop KCL completely. Reviewed to to start Carvedilol 6.25mg BID. Reviewed need for 1 week BP check, BMP lab draw, and 48Hr Holter monitor placement. Reviewed in December, OK to push out 12/18 visit, to 2 weeks post Dr. Julien visit and to change to CORE virtual visit. Pt wrote down instructions and verbalized back instructions and stated understanding.     Went to send Rx for Carvedilol, medium interaction with Verapamil, increased affects. Reviewed with Diana, PAP: VORB: Decrease Verapamil to 120mg at bedtime. Called pt with update, reviewed to decrease Verapamil to 120mg at bedtime with addition of Carvedilol. Will send new Rx. Pt stated understanding.     Updated med list and orders placed. Messaged scheduling to call pt to arrange follow up.     BAYRON ChavarriaN, RN, CHFN  11/16/20 at 3:47 PM

## 2020-11-17 DIAGNOSIS — I50.33 ACUTE ON CHRONIC HEART FAILURE WITH PRESERVED EJECTION FRACTION (HFPEF) (H): ICD-10-CM

## 2020-11-17 DIAGNOSIS — I48.91 ATRIAL FIBRILLATION, UNSPECIFIED TYPE (H): ICD-10-CM

## 2020-11-17 RX ORDER — VERAPAMIL HYDROCHLORIDE 120 MG/1
120 CAPSULE, EXTENDED RELEASE ORAL AT BEDTIME
Qty: 90 CAPSULE | Refills: 1 | Status: SHIPPED | OUTPATIENT
Start: 2020-11-17 | End: 2021-10-01

## 2020-11-17 RX ORDER — CARVEDILOL 6.25 MG/1
6.25 TABLET ORAL 2 TIMES DAILY WITH MEALS
Qty: 180 TABLET | Refills: 1 | Status: SHIPPED | OUTPATIENT
Start: 2020-11-17 | End: 2020-11-20

## 2020-11-19 ENCOUNTER — TRANSFERRED RECORDS (OUTPATIENT)
Dept: HEALTH INFORMATION MANAGEMENT | Facility: CLINIC | Age: 80
End: 2020-11-19

## 2020-11-19 ENCOUNTER — CARE COORDINATION (OUTPATIENT)
Dept: CARDIOLOGY | Facility: CLINIC | Age: 80
End: 2020-11-19

## 2020-11-19 DIAGNOSIS — I50.33 ACUTE ON CHRONIC HEART FAILURE WITH PRESERVED EJECTION FRACTION (HFPEF) (H): ICD-10-CM

## 2020-11-19 NOTE — PROGRESS NOTES
[facility]  Received VM from pt, he reports he took new medication Carvedilol this am and now BP dropped to 82/49 and he is a little concerned about it and would like call.     Per FORTUNATO Ortiz, pt seen in clinic 11/16 and SBP elevated in 150's, and reports his BP is similar at home. Verapamil decrease from 240mg to 120mg daily and Carvedilol added at 6.25mg BID. \    Returned call to pt, he reports feeling weak. He was feeling dizzy, but feeling better now. He stated he also called his PCP office and got in for an appt this afternoon and is going over shortly. He reports his most recent SBP check was.95. Pt reports this am prior to taking Carvedilol SBP was in 150's and dropped all the way into 80's. Denies dizziness now. He is sitting resting. He states he has someone taking him to doctor and they will bring him a wheelchair. Told pt to hold Carvedilol and will call in am for update. Pt stated understanding.     Dilma Rene, BAYRONN, RN, CHFN  11/19/20 at 3:05 PM

## 2020-11-20 ENCOUNTER — HOSPITAL ENCOUNTER (OUTPATIENT)
Dept: GENERAL RADIOLOGY | Facility: CLINIC | Age: 80
DRG: 854 | End: 2020-11-20
Attending: FAMILY MEDICINE
Payer: COMMERCIAL

## 2020-11-20 ENCOUNTER — APPOINTMENT (OUTPATIENT)
Dept: ULTRASOUND IMAGING | Facility: CLINIC | Age: 80
DRG: 854 | End: 2020-11-20
Attending: EMERGENCY MEDICINE
Payer: COMMERCIAL

## 2020-11-20 ENCOUNTER — HOSPITAL ENCOUNTER (INPATIENT)
Facility: CLINIC | Age: 80
LOS: 4 days | Discharge: HOME | DRG: 854 | End: 2020-11-24
Attending: EMERGENCY MEDICINE | Admitting: HOSPITALIST
Payer: COMMERCIAL

## 2020-11-20 ENCOUNTER — HOSPITAL ENCOUNTER (OUTPATIENT)
Dept: CT IMAGING | Facility: CLINIC | Age: 80
DRG: 854 | End: 2020-11-20
Attending: FAMILY MEDICINE
Payer: COMMERCIAL

## 2020-11-20 DIAGNOSIS — K57.92 DIVERTICULITIS: ICD-10-CM

## 2020-11-20 DIAGNOSIS — G89.18 ACUTE POST-OPERATIVE PAIN: ICD-10-CM

## 2020-11-20 DIAGNOSIS — R11.2 NON-INTRACTABLE VOMITING WITH NAUSEA, UNSPECIFIED VOMITING TYPE: ICD-10-CM

## 2020-11-20 DIAGNOSIS — K81.0 ACUTE CHOLECYSTITIS: Primary | ICD-10-CM

## 2020-11-20 DIAGNOSIS — K52.9 COLITIS: ICD-10-CM

## 2020-11-20 DIAGNOSIS — R10.84 ABDOMINAL PAIN, GENERALIZED: ICD-10-CM

## 2020-11-20 LAB
ALBUMIN SERPL-MCNC: 3.2 G/DL (ref 3.4–5)
ALP SERPL-CCNC: 95 U/L (ref 40–150)
ALT SERPL W P-5'-P-CCNC: 12 U/L (ref 0–70)
ANION GAP SERPL CALCULATED.3IONS-SCNC: 7 MMOL/L (ref 3–14)
AST SERPL W P-5'-P-CCNC: 18 U/L (ref 0–45)
BASOPHILS # BLD AUTO: 0 10E9/L (ref 0–0.2)
BASOPHILS NFR BLD AUTO: 0.2 %
BILIRUB SERPL-MCNC: 0.4 MG/DL (ref 0.2–1.3)
BUN SERPL-MCNC: 32 MG/DL (ref 7–30)
CALCIUM SERPL-MCNC: 8.1 MG/DL (ref 8.5–10.1)
CHLORIDE SERPL-SCNC: 104 MMOL/L (ref 94–109)
CO2 SERPL-SCNC: 23 MMOL/L (ref 20–32)
CREAT BLD-MCNC: 1.9 MG/DL (ref 0.66–1.25)
CREAT SERPL-MCNC: 1.72 MG/DL (ref 0.66–1.25)
DIFFERENTIAL METHOD BLD: ABNORMAL
EOSINOPHIL # BLD AUTO: 0.1 10E9/L (ref 0–0.7)
EOSINOPHIL NFR BLD AUTO: 0.5 %
ERYTHROCYTE [DISTWIDTH] IN BLOOD BY AUTOMATED COUNT: 17 % (ref 10–15)
GFR SERPL CREATININE-BSD FRML MDRD: 34 ML/MIN/{1.73_M2}
GFR SERPL CREATININE-BSD FRML MDRD: 36 ML/MIN/{1.73_M2}
GLUCOSE BLDC GLUCOMTR-MCNC: 212 MG/DL (ref 70–99)
GLUCOSE SERPL-MCNC: 322 MG/DL (ref 70–99)
HBA1C MFR BLD: 7.7 % (ref 0–5.6)
HCT VFR BLD AUTO: 26.1 % (ref 40–53)
HGB BLD-MCNC: 8.3 G/DL (ref 13.3–17.7)
IMM GRANULOCYTES # BLD: 0 10E9/L (ref 0–0.4)
IMM GRANULOCYTES NFR BLD: 0.3 %
LABORATORY COMMENT REPORT: NORMAL
LACTATE BLD-SCNC: 1 MMOL/L (ref 0.7–2)
LACTATE BLD-SCNC: 2.2 MMOL/L (ref 0.7–2)
LIPASE SERPL-CCNC: 46 U/L (ref 73–393)
LYMPHOCYTES # BLD AUTO: 0.9 10E9/L (ref 0.8–5.3)
LYMPHOCYTES NFR BLD AUTO: 9.2 %
MCH RBC QN AUTO: 26.9 PG (ref 26.5–33)
MCHC RBC AUTO-ENTMCNC: 31.8 G/DL (ref 31.5–36.5)
MCV RBC AUTO: 85 FL (ref 78–100)
MONOCYTES # BLD AUTO: 0.4 10E9/L (ref 0–1.3)
MONOCYTES NFR BLD AUTO: 3.8 %
NEUTROPHILS # BLD AUTO: 8.8 10E9/L (ref 1.6–8.3)
NEUTROPHILS NFR BLD AUTO: 86 %
NRBC # BLD AUTO: 0 10*3/UL
NRBC BLD AUTO-RTO: 0 /100
PLATELET # BLD AUTO: 334 10E9/L (ref 150–450)
POTASSIUM SERPL-SCNC: 4.5 MMOL/L (ref 3.4–5.3)
PROT SERPL-MCNC: 6.5 G/DL (ref 6.8–8.8)
RBC # BLD AUTO: 3.08 10E12/L (ref 4.4–5.9)
SARS-COV-2 RNA SPEC QL NAA+PROBE: NEGATIVE
SARS-COV-2 RNA SPEC QL NAA+PROBE: NORMAL
SODIUM SERPL-SCNC: 134 MMOL/L (ref 133–144)
SPECIMEN SOURCE: NORMAL
SPECIMEN SOURCE: NORMAL
WBC # BLD AUTO: 10.3 10E9/L (ref 4–11)

## 2020-11-20 PROCEDURE — 96365 THER/PROPH/DIAG IV INF INIT: CPT

## 2020-11-20 PROCEDURE — 71046 X-RAY EXAM CHEST 2 VIEWS: CPT

## 2020-11-20 PROCEDURE — 87186 SC STD MICRODIL/AGAR DIL: CPT | Performed by: EMERGENCY MEDICINE

## 2020-11-20 PROCEDURE — 74177 CT ABD & PELVIS W/CONTRAST: CPT

## 2020-11-20 PROCEDURE — 99223 1ST HOSP IP/OBS HIGH 75: CPT | Mod: AI | Performed by: HOSPITALIST

## 2020-11-20 PROCEDURE — 999N001017 HC STATISTIC GLUCOSE BY METER IP

## 2020-11-20 PROCEDURE — 250N000011 HC RX IP 250 OP 636: Performed by: EMERGENCY MEDICINE

## 2020-11-20 PROCEDURE — 258N000003 HC RX IP 258 OP 636: Performed by: EMERGENCY MEDICINE

## 2020-11-20 PROCEDURE — 250N000011 HC RX IP 250 OP 636: Performed by: FAMILY MEDICINE

## 2020-11-20 PROCEDURE — 76705 ECHO EXAM OF ABDOMEN: CPT

## 2020-11-20 PROCEDURE — 87040 BLOOD CULTURE FOR BACTERIA: CPT | Performed by: EMERGENCY MEDICINE

## 2020-11-20 PROCEDURE — 80053 COMPREHEN METABOLIC PANEL: CPT | Performed by: EMERGENCY MEDICINE

## 2020-11-20 PROCEDURE — 85025 COMPLETE CBC W/AUTO DIFF WBC: CPT | Performed by: EMERGENCY MEDICINE

## 2020-11-20 PROCEDURE — 36415 COLL VENOUS BLD VENIPUNCTURE: CPT | Performed by: HOSPITALIST

## 2020-11-20 PROCEDURE — 87800 DETECT AGNT MULT DNA DIREC: CPT | Performed by: EMERGENCY MEDICINE

## 2020-11-20 PROCEDURE — 83690 ASSAY OF LIPASE: CPT | Performed by: EMERGENCY MEDICINE

## 2020-11-20 PROCEDURE — 83036 HEMOGLOBIN GLYCOSYLATED A1C: CPT | Performed by: HOSPITALIST

## 2020-11-20 PROCEDURE — 82565 ASSAY OF CREATININE: CPT

## 2020-11-20 PROCEDURE — 87077 CULTURE AEROBIC IDENTIFY: CPT | Performed by: EMERGENCY MEDICINE

## 2020-11-20 PROCEDURE — 96361 HYDRATE IV INFUSION ADD-ON: CPT

## 2020-11-20 PROCEDURE — 99285 EMERGENCY DEPT VISIT HI MDM: CPT | Mod: 25

## 2020-11-20 PROCEDURE — 83605 ASSAY OF LACTIC ACID: CPT | Performed by: EMERGENCY MEDICINE

## 2020-11-20 PROCEDURE — 258N000003 HC RX IP 258 OP 636: Performed by: HOSPITALIST

## 2020-11-20 PROCEDURE — 96375 TX/PRO/DX INJ NEW DRUG ADDON: CPT

## 2020-11-20 PROCEDURE — 120N000001 HC R&B MED SURG/OB

## 2020-11-20 PROCEDURE — 250N000012 HC RX MED GY IP 250 OP 636 PS 637: Performed by: HOSPITALIST

## 2020-11-20 PROCEDURE — C9803 HOPD COVID-19 SPEC COLLECT: HCPCS

## 2020-11-20 PROCEDURE — U0003 INFECTIOUS AGENT DETECTION BY NUCLEIC ACID (DNA OR RNA); SEVERE ACUTE RESPIRATORY SYNDROME CORONAVIRUS 2 (SARS-COV-2) (CORONAVIRUS DISEASE [COVID-19]), AMPLIFIED PROBE TECHNIQUE, MAKING USE OF HIGH THROUGHPUT TECHNOLOGIES AS DESCRIBED BY CMS-2020-01-R: HCPCS | Performed by: EMERGENCY MEDICINE

## 2020-11-20 RX ORDER — AMOXICILLIN 250 MG
1 CAPSULE ORAL 2 TIMES DAILY
Status: DISCONTINUED | OUTPATIENT
Start: 2020-11-20 | End: 2020-11-24 | Stop reason: HOSPADM

## 2020-11-20 RX ORDER — CARVEDILOL 3.12 MG/1
3.12 TABLET ORAL 2 TIMES DAILY WITH MEALS
Status: DISCONTINUED | OUTPATIENT
Start: 2020-11-20 | End: 2020-11-24 | Stop reason: HOSPADM

## 2020-11-20 RX ORDER — OXYCODONE HYDROCHLORIDE 5 MG/1
5 TABLET ORAL
Status: DISCONTINUED | OUTPATIENT
Start: 2020-11-20 | End: 2020-11-24 | Stop reason: HOSPADM

## 2020-11-20 RX ORDER — PROCHLORPERAZINE MALEATE 5 MG
5 TABLET ORAL EVERY 6 HOURS PRN
Status: DISCONTINUED | OUTPATIENT
Start: 2020-11-20 | End: 2020-11-24 | Stop reason: HOSPADM

## 2020-11-20 RX ORDER — PRAVASTATIN SODIUM 20 MG
20 TABLET ORAL DAILY
Status: DISCONTINUED | OUTPATIENT
Start: 2020-11-21 | End: 2020-11-24 | Stop reason: HOSPADM

## 2020-11-20 RX ORDER — LOSARTAN POTASSIUM 50 MG/1
50 TABLET ORAL DAILY
Status: DISCONTINUED | OUTPATIENT
Start: 2020-11-21 | End: 2020-11-24 | Stop reason: HOSPADM

## 2020-11-20 RX ORDER — DEXTROSE MONOHYDRATE 25 G/50ML
25-50 INJECTION, SOLUTION INTRAVENOUS
Status: DISCONTINUED | OUTPATIENT
Start: 2020-11-20 | End: 2020-11-24 | Stop reason: HOSPADM

## 2020-11-20 RX ORDER — VERAPAMIL HYDROCHLORIDE 120 MG/1
120 TABLET, FILM COATED, EXTENDED RELEASE ORAL AT BEDTIME
Status: DISCONTINUED | OUTPATIENT
Start: 2020-11-20 | End: 2020-11-24 | Stop reason: HOSPADM

## 2020-11-20 RX ORDER — NALOXONE HYDROCHLORIDE 0.4 MG/ML
0.4 INJECTION, SOLUTION INTRAMUSCULAR; INTRAVENOUS; SUBCUTANEOUS
Status: DISCONTINUED | OUTPATIENT
Start: 2020-11-20 | End: 2020-11-24 | Stop reason: HOSPADM

## 2020-11-20 RX ORDER — PIPERACILLIN SODIUM, TAZOBACTAM SODIUM 2; .25 G/10ML; G/10ML
2.25 INJECTION, POWDER, LYOPHILIZED, FOR SOLUTION INTRAVENOUS EVERY 6 HOURS
Status: DISCONTINUED | OUTPATIENT
Start: 2020-11-21 | End: 2020-11-22

## 2020-11-20 RX ORDER — NALOXONE HYDROCHLORIDE 0.4 MG/ML
0.2 INJECTION, SOLUTION INTRAMUSCULAR; INTRAVENOUS; SUBCUTANEOUS
Status: DISCONTINUED | OUTPATIENT
Start: 2020-11-20 | End: 2020-11-24 | Stop reason: HOSPADM

## 2020-11-20 RX ORDER — ONDANSETRON 2 MG/ML
4 INJECTION INTRAMUSCULAR; INTRAVENOUS EVERY 6 HOURS PRN
Status: DISCONTINUED | OUTPATIENT
Start: 2020-11-20 | End: 2020-11-24 | Stop reason: HOSPADM

## 2020-11-20 RX ORDER — ONDANSETRON 2 MG/ML
4 INJECTION INTRAMUSCULAR; INTRAVENOUS ONCE
Status: COMPLETED | OUTPATIENT
Start: 2020-11-20 | End: 2020-11-20

## 2020-11-20 RX ORDER — AMOXICILLIN 250 MG
2 CAPSULE ORAL 2 TIMES DAILY
Status: DISCONTINUED | OUTPATIENT
Start: 2020-11-20 | End: 2020-11-23

## 2020-11-20 RX ORDER — POLYETHYLENE GLYCOL 3350 17 G/17G
17 POWDER, FOR SOLUTION ORAL DAILY
Status: DISCONTINUED | OUTPATIENT
Start: 2020-11-21 | End: 2020-11-24 | Stop reason: HOSPADM

## 2020-11-20 RX ORDER — LIDOCAINE 40 MG/G
CREAM TOPICAL
Status: DISCONTINUED | OUTPATIENT
Start: 2020-11-20 | End: 2020-11-24 | Stop reason: HOSPADM

## 2020-11-20 RX ORDER — ALBUTEROL SULFATE 90 UG/1
2 AEROSOL, METERED RESPIRATORY (INHALATION) EVERY 4 HOURS PRN
Status: DISCONTINUED | OUTPATIENT
Start: 2020-11-20 | End: 2020-11-24 | Stop reason: HOSPADM

## 2020-11-20 RX ORDER — CARVEDILOL 6.25 MG/1
6.25 TABLET ORAL DAILY
Qty: 180 TABLET | Refills: 1 | COMMUNITY
Start: 2020-11-20 | End: 2022-01-27

## 2020-11-20 RX ORDER — IOPAMIDOL 755 MG/ML
75 INJECTION, SOLUTION INTRAVASCULAR ONCE
Status: COMPLETED | OUTPATIENT
Start: 2020-11-20 | End: 2020-11-20

## 2020-11-20 RX ORDER — PIPERACILLIN SODIUM, TAZOBACTAM SODIUM 3; .375 G/15ML; G/15ML
3.38 INJECTION, POWDER, LYOPHILIZED, FOR SOLUTION INTRAVENOUS ONCE
Status: COMPLETED | OUTPATIENT
Start: 2020-11-20 | End: 2020-11-20

## 2020-11-20 RX ORDER — NICOTINE POLACRILEX 4 MG
15-30 LOZENGE BUCCAL
Status: DISCONTINUED | OUTPATIENT
Start: 2020-11-20 | End: 2020-11-24 | Stop reason: HOSPADM

## 2020-11-20 RX ORDER — ONDANSETRON 4 MG/1
4 TABLET, ORALLY DISINTEGRATING ORAL EVERY 6 HOURS PRN
Status: DISCONTINUED | OUTPATIENT
Start: 2020-11-20 | End: 2020-11-24 | Stop reason: HOSPADM

## 2020-11-20 RX ORDER — HYDROMORPHONE HYDROCHLORIDE 1 MG/ML
0.2 INJECTION, SOLUTION INTRAMUSCULAR; INTRAVENOUS; SUBCUTANEOUS
Status: DISCONTINUED | OUTPATIENT
Start: 2020-11-20 | End: 2020-11-24 | Stop reason: HOSPADM

## 2020-11-20 RX ORDER — PROCHLORPERAZINE 25 MG
12.5 SUPPOSITORY, RECTAL RECTAL EVERY 12 HOURS PRN
Status: DISCONTINUED | OUTPATIENT
Start: 2020-11-20 | End: 2020-11-24 | Stop reason: HOSPADM

## 2020-11-20 RX ORDER — SODIUM CHLORIDE 9 MG/ML
INJECTION, SOLUTION INTRAVENOUS CONTINUOUS
Status: DISCONTINUED | OUTPATIENT
Start: 2020-11-20 | End: 2020-11-21

## 2020-11-20 RX ADMIN — ONDANSETRON 4 MG: 2 INJECTION INTRAMUSCULAR; INTRAVENOUS at 19:59

## 2020-11-20 RX ADMIN — PIPERACILLIN SODIUM AND TAZOBACTAM SODIUM 3.38 G: 3; .375 INJECTION, POWDER, LYOPHILIZED, FOR SOLUTION INTRAVENOUS at 19:16

## 2020-11-20 RX ADMIN — SODIUM CHLORIDE: 9 INJECTION, SOLUTION INTRAVENOUS at 22:30

## 2020-11-20 RX ADMIN — INSULIN ASPART 1 UNITS: 100 INJECTION, SOLUTION INTRAVENOUS; SUBCUTANEOUS at 23:02

## 2020-11-20 RX ADMIN — INSULIN GLARGINE 10 UNITS: 100 INJECTION, SOLUTION SUBCUTANEOUS at 23:02

## 2020-11-20 RX ADMIN — IOPAMIDOL 75 ML: 755 INJECTION, SOLUTION INTRAVENOUS at 13:44

## 2020-11-20 RX ADMIN — SODIUM CHLORIDE 1000 ML: 9 INJECTION, SOLUTION INTRAVENOUS at 17:55

## 2020-11-20 ASSESSMENT — ENCOUNTER SYMPTOMS
NAUSEA: 1
VOMITING: 1
ABDOMINAL PAIN: 1
FEVER: 0
DYSURIA: 0

## 2020-11-20 ASSESSMENT — ACTIVITIES OF DAILY LIVING (ADL)
CONCENTRATING,_REMEMBERING_OR_MAKING_DECISIONS_DIFFICULTY: NO
DOING_ERRANDS_INDEPENDENTLY_DIFFICULTY: NO

## 2020-11-20 ASSESSMENT — MIFFLIN-ST. JEOR: SCORE: 1375.78

## 2020-11-20 NOTE — PROGRESS NOTES
[facility]  Called pt, reviewed instructions to decrease Carvedilol to 3.125mg BID. Pt has pill cutter at home and will take 1/2 tablet BID. Also confirmed pt it not taking potassium supplement. Reviewed to keep plan for BP check, lab, and 48Hr monitor on Monday. Reviewed if any issues with drop in BP or dizziness this weekend, call on call MD. He stated understanding. Updated Med list.     BAYRON ChavarriaN, RN, CHFN  11/20/20 at 3:07 PM

## 2020-11-20 NOTE — ED PROVIDER NOTES
History   Chief Complaint:  Abdominal Pain     The history is provided by the patient.      Tc Garcia is a 80 year old male with history of type 2 diabetes mellitus on insulin, hypertension, hyperlipidemia, and Chronic Kidney Disease who presents with lower abdominal pain and CT scan concerning for acute cholecystitis. He notes mild abdominal pain yesterday that progressed to more painful pain today. He states the pain has been constant since that time. He called his primary care physician today who scheduled an outpatient CT Abdomen Pelvis and then a XR Chest. The CT Abdomen Pelvis returned with possible acute cholecystitis, so the patient was sent to the ER. He notes he has had intermittent emesis for several weeks. The pain is not associated with eating. He denies change in his chronic diarrhea or chronic back pain. No fevers, testicular pain, or dysuria. He has had hernia surgeries. He last at at 1400 today.      CT Abdomen Pelvis 11/20/2020   IMPRESSION:   1.  Gallbladder wall thickening and surrounding inflammatory change  compatible with acute cholecystitis. No calcified gallstones present.  2.  Trace pleural fluid and probable associated atelectasis at the  left base.  3.  Minimal fluid in the left inguinal canal of uncertain etiology and  significance.  4.  Stable 5 mm stone in the inferior left kidney.    XR Chest 2 VW 11/20/2020  IMPRESSION: Minimal left pleural effusion and associated probable  atelectasis and/or fibrosis. Multiple rib fracture deformities again  evident with some healing callus present on the left. No right  effusion. The cardiac silhouette is stable. Pulmonary vasculature is  unremarkable.     Allergies:  No Known Drug Allergies    Medications:   Albuterol  Eliquis  Coreg  Lasix  Novolog  Lantus  Losartan  Lovastatin  Spironolactone  Verapamil     Past Medical History:    Anemia  Chronic Kidney Disease  Hyperlipidemia  Paroxysmal atrial fibrillation  Colonic polyps   Pleural  "effusion  Pulmonary hypertension  Type 2 diabetes mellitus  Hypertension   Diabetic ketoacidosis   Hemothorax      Past Surgical History:    Cardioversion  Right heart cath  Tonsillectomy   Adenoidectomy   Chest tube placement   Laparoscopic herniorrhaphy inguinal bilateral      Family History:    Diabetes mellitus  Heart disease     Social History:  The patient was unaccompanied to the ED.  Smoking Status: former smoker  Smokeless Tobacco: Never Used  Alcohol Use: Not currently   Drug Use: Not currently   PCP: Charlie Finch     Review of Systems   Constitutional: Negative for fever.   Gastrointestinal: Positive for abdominal pain, nausea and vomiting.   Genitourinary: Negative for dysuria and testicular pain.   All other systems reviewed and are negative.    Physical Exam     Patient Vitals for the past 24 hrs:   BP Temp Temp src Pulse Resp SpO2 Height Weight   11/20/20 1900 (!) 155/90 -- -- 78 18 99 % -- --   11/20/20 1830 (!) 155/85 -- -- 80 16 100 % -- --   11/20/20 1800 (!) 153/80 -- -- 77 18 100 % -- --   11/20/20 1723 (!) 153/76 98  F (36.7  C) Temporal 101 14 98 % 1.753 m (5' 9\") 68 kg (150 lb)     Physical Exam  General: Alert, appears elderly, otherwise well-developed and well-nourished. Cooperative.     In mild distress  HEENT:  Head:  Atraumatic  Ears:  External ears are normal  Mouth/Throat:  Oropharynx is without erythema or exudate and mucous membranes are moist.   Eyes:   Conjunctivae normal and EOM are normal. No scleral icterus.  CV:  Normal rate, regular rhythm, normal heart sounds and radial pulses are 2+ and symmetric.  No murmur.  Resp:  Breath sounds are clear bilaterally    Non-labored, no retractions or accessory muscle use  GI:  Abdomen is soft, no distension, no tenderness. No rebound or guarding.  No CVA tenderness bilaterally.  Insulin pump to lower right abdomen.   MS:  Normal range of motion. No edema.    Normal strength in all 4 extremities.     Back atraumatic.    No midline " cervical, thoracic, or lumbar tenderness  Skin:  Warm and dry.  No rash or lesions noted.  Neuro: Alert. Normal strength.  GCS: 15  Psych:  Normal mood and affect.    Emergency Department Course     Imaging:  Radiology findings were communicated with the patient who voiced understanding of the findings.    US Abdomen Limited (RUQ)  IMPRESSION: Gallbladder sludge with mild gallbladder wall thickening,  of indeterminate etiology, could be due to underlying cholecystitis.  Small echogenic, nonshadowing foci abutting the gallbladder wall,  could represent tumefactive sludge versus gallbladder polyps.  Reading per radiology    Laboratory:  Laboratory findings were communicated with the patient who voiced understanding of the findings.    Lactic acid(result time 2023) 1.0     Lipase: 46(L)  CBC: WBC 10.3, HGB 8.3(L),   CMP: glucose 322(H), BUN 32(H), Creatinine 1.72(H), GFR estimate 36(L), calcium 8.1(L), albumin 3.2(L), protein albumin 6.5(L) o/w WNL   Lactic acid whole blood(result time 1822) 2.2(H)  Blood cultures pending x2     Asymptomatic COVID19 Virus PCR by nasopharyngeal swab pending     Interventions:  1755 NS 1000 mL IV  1916 Zosyn 3.375 g IV  1916 Zosyn 3.375 g IV  1959 Zofran 4 mg IV    Emergency Department Course:  Nursing notes and vitals reviewed.    1804 I performed an exam of the patient as documented above.     2110 I returned to check on the patient after I was informed he was nauseated and had several bouts of emesis. Explained findings to the Patient.    2019 I spoke with Dr. Nolasco of the Hospitalist service from [facility] regarding patient's presentation, findings, and plan of care.     Findings and plan explained to the Patient who consents to admission. Discussed the patient with Dr. Nolasco, who will admit the patient to a [facility] bed for further monitoring, evaluation, and treatment.     Impression & Plan    Covid-19  Tc Garcia was evaluated during a global COVID-19 pandemic, which  necessitated consideration that the patient might be at risk for infection with the SARS-CoV-2 virus that causes COVID-19.   Applicable protocols for evaluation were followed during the patient's care.   COVID-19 was considered as part of the patient's evaluation. The plan for testing is:  a test was obtained during this visit.    CMS Diagnoses: The Lactic acid level is elevated due to dehydration, at this time there is no sign of severe sepsis or septic shock.    Medical Decision Making:  Patient is a 80-year-old male with a history of type 2 diabetes, hypertension, hyperlipidemia, and CKD who presents with abdominal pain and CT findings concerning for acute cholecystitis.  Patient was seen in his primary care clinic earlier today due to 2 days of progressive abdominal pain with nausea and vomiting symptoms.  Reassuringly on our initial assessment, he has no current abdominal discomfort but describes his pain as being lower in the abdomen.  He did have a CT scan earlier today which showed gallbladder wall thickening and surrounding inflammatory changes compatible with acute cholecystitis.  No other sinister intra-abdominal pathology was noted on CT imaging.  Patient ultimately did have a ultrasound performed here in the emergency department.  Although he is not actively having pain, his ultrasound does show significant gallbladder sludge with wall thickening.  I do suspect this may be due to acute cholecystitis and out of my concern for this we will treat with antibiotics at this time.  Reassuringly his bilirubin and LFTs are within normal limits.  Low concern for obstructing stone.  Patient's white count is within normal limits and patient is afebrile with normal vital signs.  Patient may require additional diagnostic studies such as HIDA scan although this can be better determined by surgical consultation.  Patient did have an initial lactate elevation likely due to dehydration and his multiple episodes of  vomiting but lower concern for sepsis.  I spoke with Dr. Nolasco of the hospitalist service who agreed to admission.    Diagnosis:    ICD-10-CM    1. Acute cholecystitis  K81.0 Lactic acid   2. Abdominal pain, generalized  R10.84    3. Non-intractable vomiting with nausea, unspecified vomiting type  R11.2        Disposition:   The patient is admitted into the care of Dr. Nolasco.    Scribe Disclosure:  I, Genet Beny, am serving as a scribe at 5:33 PM on 11/20/2020 to document services personally performed by Alejo Fernandez MD based on my observations and the provider's statements to me.   [facility] EMERGENCY DEPARTMENT       Alejo Fernandez MD  11/21/20 1059

## 2020-11-20 NOTE — PROGRESS NOTES
[facility]  Called pt, he reports BP just now was 135/83. Confirmed with pt that he only took 1 dose of Carvedilol 6.25mg yesterday am and SBP in am was 150's and afternoon SBP in 80's. Pt states he did talk to his PCP about this yesterday and she said there was no way one dose of the Carvedilol would drop his BP that much, so felt it was unrelated, but pt states he will not take anymore Carvedilol until further review from FORTUNATO Ortiz. Told pt I would recommend no further Carvedilol as well. BP is better today without Carvedilol. Pt feeling well today. Update sent to FORTUNATO Ortiz.     Of note, pt is scheduled for lab, 48Hr holter monitor, and BP check  On Monday 11/23.     Verapamil 120mg daily   Losartan 50mg daily   Lasix 80mg daily  Spironolactone 25mg daily  Carvedilol 6.25mg BID  >>> Only took 1 dose and dropped BP yesterday 11/19    Future Appointments   Date Time Provider Department Center   11/20/2020  1:45 PM SHCT1 SHCT [facility]   11/20/2020  2:30 PM SHXR3 SHXRAY [facility]   11/23/2020 12:45 PM WEIR LAB SULAB [facility]   11/23/2020  1:00 PM WEIR AMBULATORY MONITORING [facility]   11/23/2020  1:30 PM SHMON [facility]   12/9/2020 10:45 AM Cony Julien,  [facility]   12/28/2020 10:50 AM Ame Mejía PA-C [facility]     Dilma Rene, BAYRONN, RN, CHFN  11/20/20 at 12:26 PM

## 2020-11-20 NOTE — PROGRESS NOTES
Let's have him re-start the Coreg tomorrow but at 3.125 BID.  Can you also confirm that he has stopped taking the supplemental potassium?  Thanks.

## 2020-11-20 NOTE — PROGRESS NOTES
"[facility]  Called pt for update on his BP today, he reports he hasn't taken it yet. He saw PCP yesterday afternoon. He reports he has an infection of his intestines and they ordered a CT of his \"inerds\" and and Xray, as well as an antibiotic. Pt has the CT today. Pt reports his BP was much better last night and he is feeling much better today. Asked pt if he would check BP with me on phone, but he stated he would not be able to. He asked me to call back after noon and he would give me an updated BP reading. Wt today 146# and within goal (145-150#). Will send update to [facility]TUNATO Ortiz once I get updated BP. Will also send message to C[facility] RN board when in clinic next week.    Dilma Rene, BAYRONN, RN, CHFN  11/20/20 at 10:49 AM   "

## 2020-11-20 NOTE — LETTER
Transition Communication Hand-off for Care Transitions to Next Level of Care Provider    Name: Tc Garcia  : 1940  MRN #: [id number]  Primary Care Provider: Jessika Cordoba     Primary Clinic: [address] [address]     Reason for Hospitalization:  Acute cholecystitis [K81.0]  Abdominal pain, generalized [R10.84]  Non-intractable vomiting with nausea, unspecified vomiting type [R11.2]  Admit Date/Time: 2020  5:25 PM  Discharge Date:  20  Payor Source: Payor: MEDICA / Plan: [employer] / Product Type: HMO /            Reason for Communication Hand-off Referral:   High readmission risk    Discharge Plan:  Discharge Plan:      Most Recent Value   Disposition Comments  pt will discharge home, pt declines home care           Concern for non-adherence with plan of care: Past history medication confusion  Discharge Needs Assessment:  Needs      Most Recent Value   Equipment Currently Used at Home  other (see comments)   # of Referrals Placed by CTS  Scheduled Follow-up appointments          Already enrolled in Tele-monitoring program and name of program: pt calls in he weight daily to [facility]  Follow-up specialty is recommended: No    Follow-up plan:    Future Appointments   Date Time Provider Department Center          2020 10:45 AM [facility]   2020 10:50 AM Ame Mej[facility]       Any outstanding tests or procedures:            Supplies     Future Labs/Procedures    WOUND CARE SUPPLIES     Comments:    Gauze and tape for drain site          Key Recommendations:    Pt was admitted with E. coli sepsis due to acute gangrenous cholecystitis, s/p laparoscopic cholecystectomy on 2020.  LEO drain removed   Lactic acidosis likely secondary to sepsis/dehydration.    Pt declined offer for a home care nurse to ensure his medications were set-up right.  Discharged  home with spouse  Will fax Discharge Summary when this is available        Alyssa Dooley, RN  Inpatient Care Coordination  [facility]  [phone]    AVS/Discharge Summary is the source of truth; this is a helpful guide for improved communication of patient story

## 2020-11-21 LAB
ALBUMIN SERPL-MCNC: 2.5 G/DL (ref 3.4–5)
ALP SERPL-CCNC: 69 U/L (ref 40–150)
ALT SERPL W P-5'-P-CCNC: 10 U/L (ref 0–70)
ANION GAP SERPL CALCULATED.3IONS-SCNC: 6 MMOL/L (ref 3–14)
AST SERPL W P-5'-P-CCNC: 9 U/L (ref 0–45)
BILIRUB SERPL-MCNC: 0.4 MG/DL (ref 0.2–1.3)
BUN SERPL-MCNC: 23 MG/DL (ref 7–30)
CALCIUM SERPL-MCNC: 6.9 MG/DL (ref 8.5–10.1)
CHLORIDE SERPL-SCNC: 114 MMOL/L (ref 94–109)
CO2 SERPL-SCNC: 19 MMOL/L (ref 20–32)
CREAT SERPL-MCNC: 1.44 MG/DL (ref 0.66–1.25)
ERYTHROCYTE [DISTWIDTH] IN BLOOD BY AUTOMATED COUNT: 17 % (ref 10–15)
GFR SERPL CREATININE-BSD FRML MDRD: 45 ML/MIN/{1.73_M2}
GLUCOSE BLDC GLUCOMTR-MCNC: 101 MG/DL (ref 70–99)
GLUCOSE BLDC GLUCOMTR-MCNC: 329 MG/DL (ref 70–99)
GLUCOSE BLDC GLUCOMTR-MCNC: 58 MG/DL (ref 70–99)
GLUCOSE BLDC GLUCOMTR-MCNC: 79 MG/DL (ref 70–99)
GLUCOSE BLDC GLUCOMTR-MCNC: 87 MG/DL (ref 70–99)
GLUCOSE SERPL-MCNC: 302 MG/DL (ref 70–99)
HCT VFR BLD AUTO: 31.1 % (ref 40–53)
HGB BLD-MCNC: 9.7 G/DL (ref 13.3–17.7)
INR PPP: 1.5 (ref 0.86–1.14)
MCH RBC QN AUTO: 26.4 PG (ref 26.5–33)
MCHC RBC AUTO-ENTMCNC: 31.2 G/DL (ref 31.5–36.5)
MCV RBC AUTO: 85 FL (ref 78–100)
PLATELET # BLD AUTO: 344 10E9/L (ref 150–450)
POTASSIUM SERPL-SCNC: 4.1 MMOL/L (ref 3.4–5.3)
PROT SERPL-MCNC: 5.2 G/DL (ref 6.8–8.8)
RBC # BLD AUTO: 3.67 10E12/L (ref 4.4–5.9)
SODIUM SERPL-SCNC: 139 MMOL/L (ref 133–144)
WBC # BLD AUTO: 8.2 10E9/L (ref 4–11)

## 2020-11-21 PROCEDURE — 99232 SBSQ HOSP IP/OBS MODERATE 35: CPT | Performed by: HOSPITALIST

## 2020-11-21 PROCEDURE — 36415 COLL VENOUS BLD VENIPUNCTURE: CPT | Performed by: HOSPITALIST

## 2020-11-21 PROCEDURE — 85610 PROTHROMBIN TIME: CPT | Performed by: SURGERY

## 2020-11-21 PROCEDURE — 85027 COMPLETE CBC AUTOMATED: CPT | Performed by: HOSPITALIST

## 2020-11-21 PROCEDURE — 258N000003 HC RX IP 258 OP 636: Performed by: INTERNAL MEDICINE

## 2020-11-21 PROCEDURE — 999N001017 HC STATISTIC GLUCOSE BY METER IP

## 2020-11-21 PROCEDURE — 80053 COMPREHEN METABOLIC PANEL: CPT | Performed by: HOSPITALIST

## 2020-11-21 PROCEDURE — 120N000001 HC R&B MED SURG/OB

## 2020-11-21 PROCEDURE — 250N000012 HC RX MED GY IP 250 OP 636 PS 637: Performed by: HOSPITALIST

## 2020-11-21 PROCEDURE — 250N000011 HC RX IP 250 OP 636: Performed by: HOSPITALIST

## 2020-11-21 PROCEDURE — 250N000013 HC RX MED GY IP 250 OP 250 PS 637: Performed by: HOSPITALIST

## 2020-11-21 PROCEDURE — 258N000003 HC RX IP 258 OP 636: Performed by: HOSPITALIST

## 2020-11-21 PROCEDURE — 36415 COLL VENOUS BLD VENIPUNCTURE: CPT | Performed by: SURGERY

## 2020-11-21 PROCEDURE — 99222 1ST HOSP IP/OBS MODERATE 55: CPT | Mod: 57 | Performed by: SURGERY

## 2020-11-21 RX ORDER — SODIUM CHLORIDE, SODIUM LACTATE, POTASSIUM CHLORIDE, CALCIUM CHLORIDE 600; 310; 30; 20 MG/100ML; MG/100ML; MG/100ML; MG/100ML
500 INJECTION, SOLUTION INTRAVENOUS CONTINUOUS
Status: CANCELLED | OUTPATIENT
Start: 2020-11-22

## 2020-11-21 RX ADMIN — PIPERACILLIN AND TAZOBACTAM 2.25 G: 2; .25 INJECTION, POWDER, FOR SOLUTION INTRAVENOUS at 19:25

## 2020-11-21 RX ADMIN — CARVEDILOL 3.12 MG: 3.12 TABLET, FILM COATED ORAL at 18:08

## 2020-11-21 RX ADMIN — PIPERACILLIN AND TAZOBACTAM 2.25 G: 2; .25 INJECTION, POWDER, FOR SOLUTION INTRAVENOUS at 02:21

## 2020-11-21 RX ADMIN — INSULIN ASPART 1 UNITS: 100 INJECTION, SOLUTION INTRAVENOUS; SUBCUTANEOUS at 06:08

## 2020-11-21 RX ADMIN — INSULIN ASPART 8 UNITS: 100 INJECTION, SOLUTION INTRAVENOUS; SUBCUTANEOUS at 18:08

## 2020-11-21 RX ADMIN — PIPERACILLIN AND TAZOBACTAM 2.25 G: 2; .25 INJECTION, POWDER, FOR SOLUTION INTRAVENOUS at 13:30

## 2020-11-21 RX ADMIN — INSULIN ASPART 1 UNITS: 100 INJECTION, SOLUTION INTRAVENOUS; SUBCUTANEOUS at 10:03

## 2020-11-21 RX ADMIN — DEXTROSE AND SODIUM CHLORIDE: 5; 900 INJECTION, SOLUTION INTRAVENOUS at 22:53

## 2020-11-21 RX ADMIN — SODIUM CHLORIDE: 9 INJECTION, SOLUTION INTRAVENOUS at 18:16

## 2020-11-21 RX ADMIN — INSULIN GLARGINE 5 UNITS: 100 INJECTION, SOLUTION SUBCUTANEOUS at 14:43

## 2020-11-21 RX ADMIN — LOSARTAN POTASSIUM 50 MG: 50 TABLET, FILM COATED ORAL at 08:18

## 2020-11-21 RX ADMIN — PRAVASTATIN SODIUM 20 MG: 20 TABLET ORAL at 08:18

## 2020-11-21 RX ADMIN — INSULIN ASPART 1 UNITS: 100 INJECTION, SOLUTION INTRAVENOUS; SUBCUTANEOUS at 02:29

## 2020-11-21 RX ADMIN — SODIUM CHLORIDE: 9 INJECTION, SOLUTION INTRAVENOUS at 08:19

## 2020-11-21 RX ADMIN — INSULIN ASPART 3 UNITS: 100 INJECTION, SOLUTION INTRAVENOUS; SUBCUTANEOUS at 13:32

## 2020-11-21 RX ADMIN — PIPERACILLIN AND TAZOBACTAM 2.25 G: 2; .25 INJECTION, POWDER, FOR SOLUTION INTRAVENOUS at 08:17

## 2020-11-21 RX ADMIN — VERAPAMIL HYDROCHLORIDE 120 MG: 120 TABLET, FILM COATED, EXTENDED RELEASE ORAL at 02:19

## 2020-11-21 RX ADMIN — VERAPAMIL HYDROCHLORIDE 120 MG: 120 TABLET, FILM COATED, EXTENDED RELEASE ORAL at 22:04

## 2020-11-21 ASSESSMENT — ACTIVITIES OF DAILY LIVING (ADL)
ADLS_ACUITY_SCORE: 11

## 2020-11-21 NOTE — PROGRESS NOTES
MD Notification    Notified Person: MD    Notified Person Name: Constance    Notification Date/Time: 1402    Notification Interaction: Page    Purpose of Notification: FYI pt most recent blood sugar 377.     Orders Received: Lantus ordered, Novolog parameters increased     Comments:

## 2020-11-21 NOTE — PROVIDER NOTIFICATION
Notified Dr. Nolasco and primary RN Leslie Hernadez that blood cultures from left hand 11/20 at 1756 growing gram negative rods.    Update: blood culture from 11/2 1756 right arm growing MD xavi notified.

## 2020-11-21 NOTE — DISCHARGE INSTRUCTIONS
[facility]  Discharge Instructions: Post-Operative Laparoscopic Cholecystectomy    ACTIVITY    Expect to feel tired after your surgery.  This will gradually resolve.      Take frequent, short walks and increase your activity gradually.      Avoid strenuous physical activity or heavy lifting greater than 15-20 lbs. for 2-3 weeks.  You may climb stairs.    You may drive without restrictions when you are not using any prescription pain medication and feel comfortable in a car.    You may return to work/school when you are comfortable without any prescription pain medication.    WOUND CARE    You may remove your outer dressing or Band-Aids and shower 48 hours after the surgery.  Pat your incisions dry and leave them open to air.  Re-apply dressing (Band-Aids or gauze/tape) as needed for comfort or drainage.    You may have steri-strips (looks like white tape) on your incision.  You may peel off the steri-strips 2 weeks after your surgery if they have not peeled off on their own.     Do not soak your incisions in a tub or pool for 2 weeks.     Do not apply any lotions, creams, or ointments to your incisions.    A ridge under your incisions is normal and will gradually resolve.    DIET    Start with liquids, then gradually resume your regular diet as tolerated.  Avoid heavy, spicy, and greasy meals for 2-3 days.    Drink plenty of fluids to stay hydrated.    It is not uncommon to experience some loose stools or diarrhea after surgery.  This is your body's way of adapting to the bile which will slowly drain into your intestine.  A low fat diet may help with this.  This should improve over 1-2 months.    PAIN    Expect some tenderness and discomfort at the incision sites.  Use the prescribed pain medication at your discretion.  Expect gradual resolution of your pain over several days.    You may take ibuprofen with food (unless you have been told not to) or acetaminophen/Tylenol instead of or  in addition to your prescribed pain medication.  If you are taking Norco or Percocet, do not take any additional acetaminophen/Tylenol.    Do not drink alcohol or drive while you are taking pain medications.    You may apply ice to your incisions in 20 minute intervals as needed for the next 48 hours.  After that time, consider switching to heat if you prefer.    EXPECTATIONS    Pain medications can cause constipation.  Limit use when possible.  Take over the counter stool softener/stimulant, such as Colace or Senna, 1-2 times a day with plenty of water.  You may take a mild over the counter laxative, such as Miralax or a suppository, as needed.  You may discontinue these medications once you are having regular bowel movements and/or are no longer taking your narcotic pain medication.      You may have shoulder or upper back discomfort due to the gas used in surgery.  This is temporary and should resolve in 48-72 hours.  Short, frequent walks may help with this.    If you are unable to urinate, or feel as though you are not emptying your bladder adequately, we recommend you call our office and/or seek care at an ER or Urgent Care facility if after hours.    FOLLOW UP    Our office will contact you in approximately 2 weeks to check on your progress and answer any questions you may have.  If you are doing well, you will not need to return for a follow up appointment.  If any concerns are identified over the phone, we will help you make an appointment to see a provider.     If you have not received a phone call, have any questions or concerns, or would like to be seen, please call us at [phone] and ask to speak with our nurse.  We are located at [address] [place] [address].    CALL OUR OFFICE -[phone] IF YOU HAVE:     Chills or fever above 101 F.    Increased redness, warmth, or drainage at your incisions.    Significant bleeding.    Pain not relieved by your pain medication or  rest.    Increasing pain after the first 48 hours.    Any other concerns or questions.                      Revised September 2020

## 2020-11-21 NOTE — PROGRESS NOTES
[facility]    Hospitalist Progress Note    Date of Admission:  11/20/2020    Assessment & Plan     Tc Garcia is a 80 year old male with a history of type II DM, HTN, HLD, CKD 3, paroxysmal atrial fibrillation on Eliquis, HFpEF, pulmonary hypertension, reported history of medication confusion and noncompliance, recently underwent laparoscopic bilateral inguinal hernia repair presented to ED with 2 days of abdominal pain and outpatient CT scan concerning for acute cholecystitis.     E. coli sepsis due to acute cholecystitis  S/p laparoscopic bilateral inguinal hernia repair on 10/27/2020  Lactic acidosis likely secondary to dehydration/sepsis  Acute onset abdominal pain 2 days prior to presentation.  Seen by PCP and underwent CT abdomen pelvis that raised concern for acute cholecystitis.  Sent to ED.  Abdominal pain subsided in the ED however was nauseous and vomiting.  Right upper quadrant ultrasound showed findings indeterminate for acute cholecystitis.  Patient noted to be tachycardic with elevated lactate and growing E. coli on blood cultures.  Patient appeared sick with active emesis at presentation. Given fluids, Zosyn and Zofran and admitted for further management.  Normal WBC, lactate elevated at 2.2, normal lipase, normal bilirubin and LFTs.  Lactate normalized with IVF 1.0.  -Admit as inpatient  -Consult to general surgery.  Last plan for laparoscopic cholecystectomy on 11/22/2020.  -Clear liquids today.  Keep n.p.o at midnight. with maintenance IV NS at 100 cc/h  -As needed Tylenol, oxycodone, Dilaudid, antiemetics available  -Continue IV Zosyn pending blood cultures and clinical progress.   -Hold PTA Lasix and spironolactone given lactic acidosis and dehydration.     Type II DM, last A1c 7.4, uncontrolled with hyperglycemia  Appears that patient is on insulin pump at this time.  Discontinued insulin pump.  -Currently on 15 units Lantus with high-dose sliding scale insulin.   Titrate as needed.     CKD 3  Baseline creatinine this year has ranged from 1.4-1.7.  Creatinine at admission is 1.72.  Appears to be at higher end of baseline.  -Holding diuretics and hydrating with IV NS as above.  -Monitor BMP.     HTN  Paroxysmal atrial fibrillation  HFpEF  His most recent echo from May 2020 shows preserved EF 55-60%, with normal wall motion. RV was mild to moderately dilated with mildly decreased RV function. He had a RHC during his hospital stay in May as below. He has since had recurrent hospitalizations in the setting of intermittent volume overload but also a recurrent pleural effusion/empyema.  -Continue PTA carvedilol, losartan, verapamil.  -Holding PTA Eliquis as awaiting surgery.  -Hold PTA Lasix and spironolactone.  Currently appears volume down.  Watch volume and respiratory status closely with IV NS.     HLD  -Continue PTA statin     Anemia of chronic kidney disease  Hemoglobin at admission was 8.3.  Appears at baseline which has been around 8-10 this year.  -Continue to monitor intermittently.     History of multiple hospitalizations this year  History of medication confusion and noncompliance  Recurrent pleural effusions, empyema, hemothorax earlier this year  -Noted     DVT Prophylaxis: Pneumatic Compression Devices  Code Status: DNR / DNI  Expected discharge: 2 - 3 days, recommended to prior living arrangement once adequate pain management/ tolerating PO medications, cleared by surgery.             Sharri Nolasco MD  Text Page (7am - 6pm, M-F)    Interval History   Stable overnight.  Is feeling much better today.  No vomiting today.  No abdominal pain today.  Did have some diarrhea.  Awaiting surgery tomorrow.    -Data reviewed today: I reviewed all new labs and imaging results over the last 24 hours. I personally reviewed CT scan with result as noted above    Physical Exam   Temp: 98.2  F (36.8  C) Temp src: Oral BP: (!) 141/80 Pulse: 68   Resp: 16 SpO2: 97 % O2 Device: None  (Room air)    Vitals:    11/20/20 1723   Weight: 68 kg (150 lb)     Vital Signs with Ranges  Temp:  [98.2  F (36.8  C)-99.6  F (37.6  C)] 98.2  F (36.8  C)  Pulse:  [] 68  Resp:  [16-18] 16  BP: (113-162)/(66-99) 141/80  SpO2:  [94 %-100 %] 97 %  I/O last 3 completed shifts:  In: 1100 [IV Piggyback:1100]  Out: -     Constitutional: Alert, appears comfortable, in no acute distress  Respiratory: Non labored breathing, clear to auscultation bilaterally  Cardiovascular: Heart sounds regular rate and rhythm, no murmurs, no leg edema  GI: Abdomen is soft, non distended, non tender. Normal BS  Skin/Integumen: no rashes, no pressure sores  Neuro: alert, converses appropriately, moving all extremities, fluent speech, no facial asymmetry  Psych: mood and affect appropriate      Medications     sodium chloride 100 mL/hr at 11/21/20 0819       carvedilol  3.125 mg Oral BID w/meals     insulin aspart  1-12 Units Subcutaneous Q4H     insulin glargine  10 Units Subcutaneous At Bedtime     losartan  50 mg Oral Daily     piperacillin-tazobactam  2.25 g Intravenous Q6H     polyethylene glycol  17 g Oral Daily     pravastatin  20 mg Oral Daily     senna-docusate  1 tablet Oral BID    Or     senna-docusate  2 tablet Oral BID     sodium chloride (PF)  3 mL Intracatheter Q8H     verapamil ER  120 mg Oral At Bedtime       Data   Recent Labs   Lab 11/21/20  1146 11/20/20  1758   WBC 8.2 10.3   HGB 9.7* 8.3*   MCV 85 85    334   INR 1.50*  --     134   POTASSIUM 4.1 4.5   CHLORIDE 114* 104   CO2 19* 23   BUN 23 32*   CR 1.44* 1.72*   ANIONGAP 6 7   LLYOD 6.9* 8.1*   * 322*   ALBUMIN 2.5* 3.2*   PROTTOTAL 5.2* 6.5*   BILITOTAL 0.4 0.4   ALKPHOS 69 95   ALT 10 12   AST 9 18   LIPASE  --  46*       Imaging  Recent Results (from the past 24 hour(s))   US Abdomen Limited (RUQ)    Narrative    US ABDOMEN LIMITED   11/20/2020 7:57 PM     HISTORY: Right upper quadrant abdominal pain. CT concerning  for  cholecystitis.    COMPARISON: None available.    FINDINGS:    Gallbladder: Gallbladder sludge. No shadowing stones. A few small  echogenic nonshadowing foci abutting the gallbladder wall, could  represent tumefactive sludge versus gallbladder polyp. Gallbladder  wall thickening measuring 5 mm. No significant pericholecystic fluid.  Sonographic Cortez sign is negative.    Bile ducts: CHD is normal diameter. No intrahepatic biliary  dilatation.    Liver: Demonstrates normal parenchymal echogenicity. No focal hepatic  mass visualized.    Pancreas: Partially obscured by overlying bowel gas,  but grossly  unremarkable.     Right kidney: No radiodense kidney stones or hydronephrosis.    Aorta and IVC: Not specifically assessed.       Impression    IMPRESSION: Gallbladder sludge with mild gallbladder wall thickening,  of indeterminate etiology, could be due to underlying cholecystitis.  Small echogenic, nonshadowing foci abutting the gallbladder wall,  could represent tumefactive sludge versus gallbladder polyps.    LINK LUI MD

## 2020-11-21 NOTE — CONSULTS
"[facility] General Surgery Consultation    Tc Garcia MRN# [id number]   YOB: 1940 Age: 80 year old      Date of Admission:  11/20/2020  Date of Consult: 11/21/2020         Assessment and Plan:   Patient is a 80 year old male with PMH s/f paroxysmal afib on eliquis, DM, HTN, HLD and CKD who presents with acute cholecystitis and GNR bacteremia.     PLAN:  Hold eliquis for 48hrs prior to surgery (last dose yesterday am)  Laparoscopic cholecystectomy tomorrow am  Ok for clear liquids today, NPO midnight. NPO if nauseated  Am labs         Requesting Physician:      Dr. Nolasco        Chief Complaint:     Chief Complaint   Patient presents with     Abdominal Pain          History of Present Illness:   Tc Garcia is a 80 year old male who was seen in consultation at the request of Dr. Nolasco who presented with abdominal pain assoicated with nausea and emesis. No further nausea this morning. Abdominal pain is resolved. He presented to his PCP yesterday and a CT was ordered which revealed gallbladder wall thickening with surrounding inflammatory changes c/w acute cholecystitis. US then revealed gallbladder sludge with mild wall thickening. Blood cultures were positive for GNR.  Tc recently had a laparoscopic bilateral inguinal hernia repair with Dr. Garsia on  10/27/2020. He has recovered well after that surgery. He also had a fall over the summer and had multiple rib fractures and required a chest tube due to empyema.          Physical Exam:   Blood pressure 113/66, pulse 61, temperature 99.2  F (37.3  C), temperature source Oral, resp. rate 18, height 1.753 m (5' 9\"), weight 68 kg (150 lb), SpO2 99 %.  150 lbs 0 oz  General: sitting up comfortably in bed  Psych: Alert and Oriented.  Normal affect  Neurological: grossly intact  Eyes: Sclera clear  Respiratory:  nonlabored breathing  Cardiovascular:  Regular Rate and Rhythm   GI: soft, nontender, healing laparoscopic port sites on lower " abdomen, no masses, no hernias   Lymphatic/Hematologic/Immune:  No femoral or cervical lymphadenopathy.  Integumentary:  No rashes         Past Medical History:     Past Medical History:   Diagnosis Date     Anemia      Anemia of chronic disease 5/14/2020     Back pain      CKD (chronic kidney disease) stage 3, GFR 30-59 ml/min      CRF (chronic renal failure), stage 3  5/14/2020     Fall 11/2019    suffered multiple left rib fractures, C3 and T2 fractures     Mixed hyperlipidemia 7/7/2004     Paroxysmal atrial fibrillation (H)      Personal history of colonic polyps 1972    gets colonoscopy every five years, due in 2006     Pleural effusion on left 11/2019    after multiple rib fractures     Pulmonary hypertension (H) 5/10/2020    Added automatically from request for surgery [id number]     Recurrent Left Pleural effusion -- S/P Pleurex Cath 5/12/20 12/30/2019     Rosacea 1989     Type II or unspecified type diabetes mellitus without mention of complication, not stated as uncontrolled 1999     Unspecified essential hypertension 1994            Past Surgical History:     Past Surgical History:   Procedure Laterality Date     ANESTHESIA CARDIOVERSION N/A 2/3/2020    Procedure: ANESTHESIA, FOR CARDIOVERSION;  Surgeon: GENERIC ANESTHESIA PROVIDER;  Location:  OR      RIGHT HEART CATH N/A 5/13/2020    Procedure: Right Heart Cath;  Surgeon: Senthil Silva MD;  Location:  HEART CARDIAC CATH LAB     HC REMOVE TONSILS/ADENOIDS,<11 Y/O      T & A <11y.o.     IR CHEST TUBE DRAIN TUNNELED LEFT  5/12/2020     IR CHEST TUBE PLACEMENT NON-TUNNELLED LEFT  7/11/2020     IR CHEST TUBE REMOVAL NON TUNNELED LEFT  7/17/2020     IR CHEST TUBE REMOVAL TUNNELED LEFT  7/11/2020     LAPAROSCOPIC HERNIORRHAPHY INGUINAL BILATERAL Bilateral 10/27/2020    Procedure: LAPAROSCOPIC BILATERAL INGUINAL HERNIA REPAIR WITH MESH;  Surgeon: Brian Garsia MD;  Location:  OR            Current Medications:           carvedilol  3.125  mg Oral BID w/meals     insulin aspart  1-4 Units Subcutaneous Q4H     insulin glargine  10 Units Subcutaneous At Bedtime     losartan  50 mg Oral Daily     piperacillin-tazobactam  2.25 g Intravenous Q6H     polyethylene glycol  17 g Oral Daily     pravastatin  20 mg Oral Daily     senna-docusate  1 tablet Oral BID    Or     senna-docusate  2 tablet Oral BID     sodium chloride (PF)  3 mL Intracatheter Q8H     verapamil ER  120 mg Oral At Bedtime       albuterol, glucose **OR** dextrose **OR** glucagon, HYDROmorphone, lidocaine 4%, lidocaine (buffered or not buffered), melatonin, naloxone **OR** naloxone **OR** naloxone **OR** naloxone, ondansetron **OR** ondansetron, oxyCODONE, prochlorperazine **OR** prochlorperazine **OR** prochlorperazine, sodium chloride (PF)         Home Medications:     Prior to Admission medications    Medication Sig Last Dose Taking? Auth Provider   albuterol (PROAIR HFA/PROVENTIL HFA/VENTOLIN HFA) 108 (90 Base) MCG/ACT inhaler Inhale 2 puffs into the lungs every 4 hours as needed for shortness of breath / dyspnea or wheezing Inhale 2 puffs every 4 to 6 hours as needed.  Yes Unknown, Entered By History   apixaban ANTICOAGULANT (ELIQUIS) 2.5 MG tablet Take 1 tablet (2.5 mg) by mouth 2 times daily  Yes Ame Mejía PA-C   carvedilol (COREG) 6.25 MG tablet Take 0.5 tablets (3.125 mg) by mouth 2 times daily (with meals)  Yes Ame Mejía PA-C   furosemide (LASIX) 80 MG tablet Take 1 tablet (80 mg) by mouth daily  Yes Ame Mejía PA-C   losartan (COZAAR) 50 MG tablet Take 1 tablet (50 mg) by mouth daily  Yes Ame Mejía PA-C   LOVASTATIN 20 MG PO TABS 1 TABLET DAILY AT DINNER  Yes Tc Palacios MD   spironolactone (ALDACTONE) 25 MG tablet Take 1 tablet (25 mg) by mouth daily  Yes Ame Mejía PA-C   verapamil ER (VERELAN) 120 MG 24 hr capsule Take 1 capsule (120 mg) by mouth At Bedtime  Yes Ame Mejía PA-C   glucagon 1 MG kit 1 mg as  needed for low blood sugar   Unknown, Entered By History   HYDROcodone-acetaminophen (NORCO) 5-325 MG tablet Take 1 tablet by mouth every 4 hours as needed for moderate to severe pain   Lore Parker PA-C   insulin aspart (NOVOLOG PEN) 100 UNIT/ML pen Inject 8 units subcutaneous with breakfast, 8 units subcutaneous with lunch and 4 units with supper.   Anny Beach MD   insulin glargine (LANTUS PEN) 100 UNIT/ML pen Inject 14 Units Subcutaneous every morning   Unknown, Entered By History   nystatin (MYCOSTATIN) 870635 UNIT/GM external cream Apply topically 2 times daily as needed    Unknown, Entered By History   senna-docusate (SENOKOT-S/PERICOLACE) 8.6-50 MG tablet Take 1-2 tablets by mouth 2 times daily as needed for constipation   Lore Parker PA-C            Allergies:     Allergies   Allergen Reactions     No Known Drug Allergies             Family History:     Family History   Problem Relation Age of Onset     Family History Negative Mother          age 71     Family History Negative Father          age 70     Diabetes Brother         alive age 77     Diabetes Brother         alive age 76     Family History Negative Brother              Family History Negative Brother              Diabetes Sister         alive age76     Family History Negative Sister          age 86     Heart Disease Sister              Family History Negative Sister              Family History Negative Sister              Diabetes Sister      Diabetes Sister      Diabetes Brother      Diabetes Brother            Social History:   Tc Garcia  reports that he quit smoking about 12 years ago. His smoking use included cigarettes. He started smoking about 52 years ago. He has a 8.00 pack-year smoking history. He has never used smokeless tobacco. He reports previous alcohol use of about 35.0 standard drinks of alcohol per week. He reports previous drug  use.          Review of Systems:   The 10 point Review of Systems is negative other than noted in the HPI.         Labs/Imaging   All new lab and imaging data was reviewed.   I have personally reviewed the imaging studies including his CT and US        Annette Lambert MD

## 2020-11-21 NOTE — PLAN OF CARE
Summary: Acute abdominal pain   DATE & TIME: 11/21/2020 Day     Cognitive Concerns/ Orientation : Alert/Oriented x 4   BEHAVIOR & AGGRESSION TOOL COLOR: Green   ABNL VS/O2: VSS on RA  MOBILITY: SBA using IV pole  PAIN MANAGMENT: Denies  DIET: Clear liquids, NPO at midnight   BOWEL/BLADDER: Up to bathroom-1 BM this shift  ABNL LAB/BG: Creat 1.44, GFR 36, Albumin 2.5, Hgb 9.7, Implanted glucometer , 377  DRAIN/DEVICES: R PIV infusing normal saline at 100 mL/hour, IV Zosyn (Q6 hours)  SKIN: bruising  TESTS/PROCEDURES: Surgery consult, plan for lap jose tomorrow 11/22 at 0900. NPO at midnight. Blood cultures positive for gram negative rods  D/C DAY/GOALS/PLACE: Pending  OTHER IMPORTANT INFO: Pt has deactivated insulin pump on abdomen; Implanted glucometer in upper left shoulder  COMMIT TO SIT DONE AND SIGNED OFF YES

## 2020-11-21 NOTE — PROGRESS NOTES
MD Notification    Notified Person: MD    Notified Person Name: Dr. Sorto    Notification Date/Time:November 21, 2020  6:46 AM    Notification Interaction: Phone call    Purpose of Notification: Positive blood cultures from R arm 11/20: gram negative rods. Pt on Q 6 Zosyn    Orders Received: No new orders    Comments:

## 2020-11-21 NOTE — ED NOTES
"[facility]  ED Nurse Handoff Report    ED Chief complaint: Abdominal Pain      ED Diagnosis:   Final diagnoses:   Acute cholecystitis       Code Status: to be discussed with admitting hospitalist    Allergies:   Allergies   Allergen Reactions     No Known Drug Allergies        Patient Story: patient recommended to come to ED by PCP d/t cholecystitis. Had outpatient CT done at [place] this morning, told to come in this evening.  Focused Assessment:  N/V - vomited multiple times (bile), abdomen pain, alert and oriented, makes needs known.    Treatments and/or interventions provided: IVF, IV abx, imaging, labs, zofran, per plan of care ED  Patient's response to treatments and/or interventions: tolerating, still intermittently nauseated.     To be done/followed up on inpatient unit:  NA    Does this patient have any cognitive concerns?: NA    Activity level - Baseline/Home:  Independent  Activity Level - Current:   Stand with Assist    Patient's Preferred language: English   Needed?: No    Isolation: None  Infection: Not Applicable  Patient tested for COVID 19 prior to admission: YES  Bariatric?: No    Vital Signs:   Vitals:    11/20/20 1723 11/20/20 1800 11/20/20 1830 11/20/20 1900   BP: (!) 153/76 (!) 153/80 (!) 155/85 (!) 155/90   Pulse: 101 77 80 78   Resp: 14 18 16 18   Temp: 98  F (36.7  C)      TempSrc: Temporal      SpO2: 98% 100% 100% 99%   Weight: 68 kg (150 lb)      Height: 1.753 m (5' 9\")          Cardiac Rhythm:     Was the PSS-3 completed:   Yes  What interventions are required if any?               Family Comments: family available by phone  OBS brochure/video discussed/provided to patient/family: No              Name of person given brochure if not patient: NA              Relationship to patient: NA    For the majority of the shift this patient's behavior was Green.   Behavioral interventions performed were NA.    ED NURSE PHONE NUMBER: [phone]         "

## 2020-11-21 NOTE — PROGRESS NOTES
.RECEIVING UNIT ED HANDOFF REVIEW    ED Nurse Handoff Report was reviewed by: León Carballo RN on November 20, 2020 at 9:37 PM     Notes being reviewed.   Room 603-2 being set up for pt.   Please text when pt is ready for transfer.

## 2020-11-21 NOTE — H&P
[facility]    History and Physical  Hospitalist       Date of Admission:  11/20/2020    Assessment & Plan   Tc Garcia is a 80 year old male with a history of type II DM, HTN, HLD, CKD 3, paroxysmal atrial fibrillation on Eliquis, HFpEF, pulmonary hypertension, reported history of medication confusion and noncompliance, recently underwent laparoscopic bilateral inguinal hernia repair presented to ED with 2 days of abdominal pain and outpatient CT scan concerning for acute cholecystitis.    Abdominal pain, nausea and vomiting likely due to acute cholecystitis  S/p laparoscopic bilateral inguinal hernia repair on 10/27/2020  Lactic acidosis likely secondary to dehydration  Acute onset abdominal pain 2 days prior to presentation.  Seen by PCP and underwent CT abdomen pelvis that raised concern for acute cholecystitis.  Sent to ED.  Abdominal pain subsided in the ED however was nauseous and vomiting.  Right upper quadrant ultrasound showed findings indeterminate for acute cholecystitis.  Given fluids, Zosyn and Zofran and admitted for further management.  Normal WBC, lactate elevated at 2.2, normal lipase, normal bilirubin and LFTs.  Lactate normalized with IVF 1.0.  -Admit as inpatient  -Consult to general surgery  -Keep n.p.o. with maintenance IV NS at 100 cc/h  -As needed Tylenol, oxycodone, Dilaudid, antiemetics available  -Continue IV Zosyn pending blood cultures and clinical progress.  Check procalcitonin.  -Hold PTA Lasix and spironolactone given lactic acidosis and dehydration.    Type II DM, last A1c 7.4, uncontrolled with hyperglycemia  Appears that patient is on insulin pump at this time.  Will discontinue insulin pump.  -Started 10 units of Lantus at bedtime along with low-dose sliding scale insulin.  Titrate as needed.    CKD 3  Baseline creatinine this year has ranged from 1.4-1.7.  Creatinine at admission is 1.72.  Appears to be at higher end of baseline.  -Holding diuretics  and hydrating with IV NS as above.  -Monitor BMP.    HTN  Paroxysmal atrial fibrillation  HFpEF  His most recent echo from May 2020 shows preserved EF 55-60%, with normal wall motion. RV was mild to moderately dilated with mildly decreased RV function. He had a RHC during his hospital stay in May as below. He has since had recurrent hospitalizations in the setting of intermittent volume overload but also a recurrent pleural effusion/empyema.  -Continue PTA carvedilol, losartan, verapamil.  -Holding PTA Eliquis till surgical plan is formalized.  -Hold PTA Lasix and spironolactone.  Currently appears volume down.  Watch volume and respiratory status closely with IV NS.    HLD  -Continue PTA statin    Anemia of chronic kidney disease  Hemoglobin at admission was 8.3.  Appears at baseline which has been around 8-10 this year.  -Continue to monitor intermittently.    History of multiple hospitalizations this year  History of medication confusion and noncompliance  Recurrent pleural effusions, empyema, hemothorax earlier this year  -Noted    DVT Prophylaxis: Pneumatic Compression Devices  Code Status: DNR / DNI  Expected discharge: 2 - 3 days, recommended to prior living arrangement once adequate pain management/ tolerating PO medications, cleared by surgery.    Sharri Nolasco MD    Primary Care Physician   Charlie Finch    Chief Complaint   Abdominal pain, nausea and vomiting    History is obtained from the patient, ED provider and chart review    History of Present Illness   Tc Garcia is a 80 year old male with a history of type II DM, HTN, HLD, CKD 3, paroxysmal atrial fibrillation on Eliquis, HFpEF, pulmonary hypertension, reported history of medication confusion and noncompliance, recently underwent laparoscopic bilateral inguinal hernia repair presented to ED with 2 days of abdominal pain and outpatient CT scan concerning for acute cholecystitis.    Patient underwent the bilateral inguinal hernia repair  surgery on 10/27/2020.  States that he was doing well since the surgery.  However over last 2 days noted lower abdominal pain that worsened yesterday.  Today was seen in PCP office and subsequently underwent CT abdomen pelvis that showed gallbladder wall thickening and surrounding inflammatory change compatible with acute cholecystitis.  Subsequently sent to the ED for further evaluation.   In the ED, he is noted to be hypertensive, afebrile and other vitals stable.  His abdominal pain has subsided however he is quite nauseous and is actively throwing up.  He denies any fevers, chills, sweats, chest pain, shortness of breath.  He underwent right upper quadrant ultrasound that showed gallbladder sludge with mild GB wall thickening of indeterminate etiology, could be due to underlying cholecystitis.  Labs significant for lipase 46, WBC 10.3, hemoglobin 8.3, creatinine 1.7, BUN 32, GFR 36, lactate 2.2.  Blood cultures and asymptomatic Covid swab sent from ED.  He was given a dose of IV Zosyn, 1 L NS IV bolus and IV Zofran.  He is admitted for further management.    He is a former smoker quit several years ago.  Also endorses significant alcohol history but states that he quit over a year ago.  Currently lives at home with his wife.      Past Medical History    I have reviewed this patient's medical history and updated it with pertinent information if needed.   Past Medical History:   Diagnosis Date     Anemia      Anemia of chronic disease 5/14/2020     Back pain      CKD (chronic kidney disease) stage 3, GFR 30-59 ml/min      CRF (chronic renal failure), stage 3  5/14/2020     Fall 11/2019    suffered multiple left rib fractures, C3 and T2 fractures     Mixed hyperlipidemia 7/7/2004     Paroxysmal atrial fibrillation (H)      Personal history of colonic polyps 1972    gets colonoscopy every five years, due in 2006     Pleural effusion on left 11/2019    after multiple rib fractures     Pulmonary hypertension (H)  5/10/2020    Added automatically from request for surgery [id number]     Recurrent Left Pleural effusion -- S/P Pleurex Cath 2019     Rosacea      Type II or unspecified type diabetes mellitus without mention of complication, not stated as uncontrolled      Unspecified essential hypertension        Past Surgical History   I have reviewed this patient's surgical history and updated it with pertinent information if needed.  Past Surgical History:   Procedure Laterality Date     ANESTHESIA CARDIOVERSION N/A 2/3/2020    Procedure: ANESTHESIA, FOR CARDIOVERSION;  Surgeon: GENERIC ANESTHESIA PROVIDER;  Location:  OR     CV RIGHT HEART CATH N/A 2020    Procedure: Right Heart Cath;  Surgeon: Senthil Silva MD;  Location:  HEART CARDIAC CATH LAB     HC REMOVE TONSILS/ADENOIDS,<13 Y/O      T & A <11y.o.     IR CHEST TUBE DRAIN TUNNELED LEFT  2020     IR CHEST TUBE PLACEMENT NON-TUNNELLED LEFT  2020     IR CHEST TUBE REMOVAL NON TUNNELED LEFT  2020     IR CHEST TUBE REMOVAL TUNNELED LEFT  2020     LAPAROSCOPIC HERNIORRHAPHY INGUINAL BILATERAL Bilateral 10/27/2020    Procedure: LAPAROSCOPIC BILATERAL INGUINAL HERNIA REPAIR WITH MESH;  Surgeon: Brian Garsia MD;  Location:  OR       Prior to Admission Medications   Prior to Admission Medications   Prescriptions Last Dose Informant Patient Reported? Taking?   HYDROcodone-acetaminophen (NORCO) 5-325 MG tablet   No No   Sig: Take 1 tablet by mouth every 4 hours as needed for moderate to severe pain   LOVASTATIN 20 MG PO TABS  Self No Yes   Si TABLET DAILY AT DINNER   albuterol (PROAIR HFA/PROVENTIL HFA/VENTOLIN HFA) 108 (90 Base) MCG/ACT inhaler  Self Yes Yes   Sig: Inhale 2 puffs into the lungs every 4 hours as needed for shortness of breath / dyspnea or wheezing Inhale 2 puffs every 4 to 6 hours as needed.   apixaban ANTICOAGULANT (ELIQUIS) 2.5 MG tablet   Yes Yes   Sig: Take 1 tablet (2.5 mg) by mouth 2  times daily   carvedilol (COREG) 6.25 MG tablet   Yes Yes   Sig: Take 0.5 tablets (3.125 mg) by mouth 2 times daily (with meals)   furosemide (LASIX) 80 MG tablet   No Yes   Sig: Take 1 tablet (80 mg) by mouth daily   glucagon 1 MG kit  Self Yes No   Si mg as needed for low blood sugar   insulin aspart (NOVOLOG PEN) 100 UNIT/ML pen  Self No No   Sig: Inject 8 units subcutaneous with breakfast, 8 units subcutaneous with lunch and 4 units with supper.   insulin glargine (LANTUS PEN) 100 UNIT/ML pen  Self Yes No   Sig: Inject 14 Units Subcutaneous every morning   losartan (COZAAR) 50 MG tablet   No Yes   Sig: Take 1 tablet (50 mg) by mouth daily   nystatin (MYCOSTATIN) 584627 UNIT/GM external cream  Self Yes No   Sig: Apply topically 2 times daily as needed    senna-docusate (SENOKOT-S/PERICOLACE) 8.6-50 MG tablet   No No   Sig: Take 1-2 tablets by mouth 2 times daily as needed for constipation   spironolactone (ALDACTONE) 25 MG tablet   No Yes   Sig: Take 1 tablet (25 mg) by mouth daily   verapamil ER (VERELAN) 120 MG 24 hr capsule   No Yes   Sig: Take 1 capsule (120 mg) by mouth At Bedtime      Facility-Administered Medications: None     Allergies   Allergies   Allergen Reactions     No Known Drug Allergies        Social History   I have reviewed this patient's social history and updated it with pertinent information if needed. Tc Garcia  reports that he quit smoking about 12 years ago. His smoking use included cigarettes. He started smoking about 52 years ago. He has a 8.00 pack-year smoking history. He has never used smokeless tobacco. He reports previous alcohol use of about 35.0 standard drinks of alcohol per week. He reports previous drug use.    Family History   I have reviewed this patient's family history and updated it with pertinent information if needed.   Family History   Problem Relation Age of Onset     Family History Negative Mother          age 71     Family History Negative Father           age 70     Diabetes Brother         alive age 77     Diabetes Brother         alive age 76     Family History Negative Brother              Family History Negative Brother              Diabetes Sister         alive age76     Family History Negative Sister          age 86     Heart Disease Sister              Family History Negative Sister              Family History Negative Sister              Diabetes Sister      Diabetes Sister      Diabetes Brother      Diabetes Brother        Review of Systems   The 10 point Review of Systems is negative other than noted in the HPI or here.     Physical Exam   Temp: 98  F (36.7  C) Temp src: Temporal BP: (!) 150/92 Pulse: 97   Resp: 18 SpO2: 98 % O2 Device: None (Room air)    Vital Signs with Ranges  Temp:  [98  F (36.7  C)] 98  F (36.7  C)  Pulse:  [] 97  Resp:  [14-18] 18  BP: (150-162)/(76-92) 150/92  SpO2:  [98 %-100 %] 98 %  150 lbs 0 oz    Constitutional: Awake, alert, cooperative, in mild distress with nausea and actively vomiting.  Eyes: no icterus, EOMs intact  HEENT: Moist mucous membranes  Respiratory: Clear to auscultation bilaterally, no crackles or wheezing.  Cardiovascular: IR IR, normal S1 and S2, and no murmur noted.  GI: Soft, non-distended, non-tender, normal bowel sounds.  Insulin pump noted  Skin: No rashes, no cyanosis, bilateral ankle edema noted  Musculoskeletal: No joint swelling, erythema or tenderness.  Neurologic: Alert, oriented and engages in appropriate conversation, no facial asymmetry, moving all extremities, fluent speech  Psychiatric: Calm and pleasant, no obvious anxiety or depression.     Data   Data reviewed today:  I personally reviewed CT scan with result as noted above  Recent Labs   Lab 20  1758   WBC 10.3   HGB 8.3*   MCV 85         POTASSIUM 4.5   CHLORIDE 104   CO2 23   BUN 32*   CR 1.72*   ANIONGAP 7   LLOYD 8.1*   *   ALBUMIN 3.2*   PROTTOTAL  6.5*   BILITOTAL 0.4   ALKPHOS 95   ALT 12   AST 18   LIPASE 46*       Recent Results (from the past 24 hour(s))   XR Chest 2 Views    Narrative    CHEST TWO VIEWS 11/20/2020 1:58 PM     HISTORY: WBC> 17.00. Diverticulitis.    COMPARISON: July 17, 2020       Impression    IMPRESSION: Minimal left pleural effusion and associated probable  atelectasis and/or fibrosis. Multiple rib fracture deformities again  evident with some healing callus present on the left. No right  effusion. The cardiac silhouette is stable. Pulmonary vasculature is  unremarkable.    ALLYSON MARTINEZ MD   CT Abdomen Pelvis w Contrast    Narrative    CT ABDOMEN AND PELVIS WITH CONTRAST 11/20/2020 2:02 PM    CLINICAL HISTORY: Abdominal pain, diarrhea, increased white blood cell  count. Rule out diverticulitis. Diverticulitis. Colitis.    TECHNIQUE: CT scan of the abdomen and pelvis was performed following  injection of IV contrast. Multiplanar reformats were obtained. Dose  reduction techniques were used.    CONTRAST: 75mL Isovue-370    COMPARISON: 11/9/2019    FINDINGS:   LOWER CHEST: Minimal pleural fluid and probable atelectasis at the  left base. Right lung clear.    HEPATOBILIARY: Marked gallbladder wall thickening and surrounding  inflammatory change indicating acute cholecystitis. No calcified  gallstones. Liver unremarkable.    PANCREAS: Either surgically absent or demonstrates extensive fatty  atrophy.    SPLEEN: Normal size.    ADRENAL GLANDS: No significant nodules.    KIDNEYS/BLADDER: 5 mm stone in the inferior left kidney. Right kidney  unremarkable. No hydronephrosis or bladder stones.    BOWEL: No obstruction or inflammatory change. Mild diverticulosis. No  definite diverticulitis.    PELVIC ORGANS: No pelvic masses. Fluid in the left inguinal canal  noted.    ADDITIONAL FINDINGS: Small amount of pelvic free fluid. There are  extensive atherosclerotic changes of the visualized aorta and its  branches. There is no evidence of aortic  dissection or aneurysm.    MUSCULOSKELETAL: Survey of the visualized bony structures demonstrates  no destructive bony lesions.      Impression    IMPRESSION:   1.  Gallbladder wall thickening and surrounding inflammatory change  compatible with acute cholecystitis. No calcified gallstones present.  2.  Trace pleural fluid and probable associated atelectasis at the  left base.  3.  Minimal fluid in the left inguinal canal of uncertain etiology and  significance.  4.  Stable 5 mm stone in the inferior left kidney.    ALLYSON MARTINEZ MD   US Abdomen Limited (RUQ)    Narrative    US ABDOMEN LIMITED   11/20/2020 7:57 PM     HISTORY: Right upper quadrant abdominal pain. CT concerning for  cholecystitis.    COMPARISON: None available.    FINDINGS:    Gallbladder: Gallbladder sludge. No shadowing stones. A few small  echogenic nonshadowing foci abutting the gallbladder wall, could  represent tumefactive sludge versus gallbladder polyp. Gallbladder  wall thickening measuring 5 mm. No significant pericholecystic fluid.  Sonographic Cortez sign is negative.    Bile ducts: CHD is normal diameter. No intrahepatic biliary  dilatation.    Liver: Demonstrates normal parenchymal echogenicity. No focal hepatic  mass visualized.    Pancreas: Partially obscured by overlying bowel gas,  but grossly  unremarkable.     Right kidney: No radiodense kidney stones or hydronephrosis.    Aorta and IVC: Not specifically assessed.       Impression    IMPRESSION: Gallbladder sludge with mild gallbladder wall thickening,  of indeterminate etiology, could be due to underlying cholecystitis.  Small echogenic, nonshadowing foci abutting the gallbladder wall,  could represent tumefactive sludge versus gallbladder polyps.    LINK LUI MD

## 2020-11-21 NOTE — PROGRESS NOTES
DATE & TIME: 11/20/2020 Night    Cognitive Concerns/ Orientation : Alert/Oriented x 4   BEHAVIOR & AGGRESSION TOOL COLOR: Green   ABNL VS/O2: Temp 99.6; Heart rate 115, otherwise VSS, room air  MOBILITY: SBA using IV pole  PAIN MANAGMENT: Denies  DIET: NPO, except ice chips and meds  BOWEL/BLADDER: Up to bathroom-pt reports 1 loose stool/diarrhea  ABNL LAB/BG: Urea nitrogen 32; Creat 1.72; GFR 36; Albumin 3.2; Protein 6.5; Hgb 8.3; Hematocrit 26.1; Implanted glucometer , 212 (1 unit novolog given each time)  DRAIN/DEVICES: R PIV infusing normal saline at 100 mL/hour, IV Zosyn (Q6 hours)  SKIN: bruising  TESTS/PROCEDURES: Surgery consult 11/21; CT and ultrasound of abdomen completed in ED; Positive blood cultures for gram negative rods  D/C DAY/GOALS/PLACE: Pending  OTHER IMPORTANT INFO: Pt has deactivated insulin pump on abdomen; Implanted glucometer in upper left shoulder  COMMIT TO SIT DONE AND SIGNED OFF YES

## 2020-11-21 NOTE — PLAN OF CARE
Cognitive Concerns/ Orientation : A&Ox4    BEHAVIOR & AGGRESSION TOOL COLOR: Green   CIWA SCORE: NA    ABNL VS/O2: VSS with BP in the 160's. Scheduled carvidol, pt refusing until nausea under control. On RA   MOBILITY: SBA   PAIN MANAGMENT: Denies/managable   DIET: NPO with IV fluids   BOWEL/BLADDER: WDL, nausea and emesis reported.   ABNL LAB/BG: Crea 1.32, Lipase 46, , Covid swab pending for asymptomatic test   DRAIN/DEVICES: PIV infusing NS at 100mL/hr   TELEMETRY RHYTHM: NA   SKIN: WDL   TESTS/PROCEDURES: CT and US of abdomin completed in ED. Q4 BGL checks.   D/C DAY/GOALS/PLACE: Pending, Surgery consult placed  OTHER IMPORTANT INFO: Pt calls as needed. Insulin pump deactivated by pt and staff will be monitoring.

## 2020-11-22 ENCOUNTER — ANESTHESIA (OUTPATIENT)
Dept: SURGERY | Facility: CLINIC | Age: 80
DRG: 854 | End: 2020-11-22
Payer: COMMERCIAL

## 2020-11-22 ENCOUNTER — SURGERY (OUTPATIENT)
Age: 80
End: 2020-11-22
Payer: COMMERCIAL

## 2020-11-22 ENCOUNTER — ANESTHESIA EVENT (OUTPATIENT)
Dept: SURGERY | Facility: CLINIC | Age: 80
DRG: 854 | End: 2020-11-22
Payer: COMMERCIAL

## 2020-11-22 LAB
ALBUMIN SERPL-MCNC: 2.7 G/DL (ref 3.4–5)
ALP SERPL-CCNC: 66 U/L (ref 40–150)
ALT SERPL W P-5'-P-CCNC: 13 U/L (ref 0–70)
ANION GAP SERPL CALCULATED.3IONS-SCNC: 5 MMOL/L (ref 3–14)
AST SERPL W P-5'-P-CCNC: 24 U/L (ref 0–45)
BILIRUB SERPL-MCNC: 0.5 MG/DL (ref 0.2–1.3)
BUN SERPL-MCNC: 14 MG/DL (ref 7–30)
CALCIUM SERPL-MCNC: 7 MG/DL (ref 8.5–10.1)
CHLORIDE SERPL-SCNC: 112 MMOL/L (ref 94–109)
CO2 SERPL-SCNC: 22 MMOL/L (ref 20–32)
CREAT SERPL-MCNC: 1.31 MG/DL (ref 0.66–1.25)
GFR SERPL CREATININE-BSD FRML MDRD: 50 ML/MIN/{1.73_M2}
GLUCOSE BLDC GLUCOMTR-MCNC: 129 MG/DL (ref 70–99)
GLUCOSE BLDC GLUCOMTR-MCNC: 170 MG/DL (ref 70–99)
GLUCOSE BLDC GLUCOMTR-MCNC: 79 MG/DL (ref 70–99)
GLUCOSE BLDC GLUCOMTR-MCNC: 81 MG/DL (ref 70–99)
GLUCOSE BLDC GLUCOMTR-MCNC: 99 MG/DL (ref 70–99)
GLUCOSE SERPL-MCNC: 171 MG/DL (ref 70–99)
POTASSIUM SERPL-SCNC: 4.3 MMOL/L (ref 3.4–5.3)
PROT SERPL-MCNC: 5.3 G/DL (ref 6.8–8.8)
SODIUM SERPL-SCNC: 139 MMOL/L (ref 133–144)

## 2020-11-22 PROCEDURE — 999N001017 HC STATISTIC GLUCOSE BY METER IP

## 2020-11-22 PROCEDURE — 250N000011 HC RX IP 250 OP 636: Performed by: HOSPITALIST

## 2020-11-22 PROCEDURE — 250N000012 HC RX MED GY IP 250 OP 636 PS 637: Performed by: HOSPITALIST

## 2020-11-22 PROCEDURE — 258N000003 HC RX IP 258 OP 636: Performed by: NURSE ANESTHETIST, CERTIFIED REGISTERED

## 2020-11-22 PROCEDURE — 47562 LAPAROSCOPIC CHOLECYSTECTOMY: CPT | Mod: AS | Performed by: PHYSICIAN ASSISTANT

## 2020-11-22 PROCEDURE — 87075 CULTR BACTERIA EXCEPT BLOOD: CPT | Performed by: SURGERY

## 2020-11-22 PROCEDURE — 87186 SC STD MICRODIL/AGAR DIL: CPT | Performed by: SURGERY

## 2020-11-22 PROCEDURE — 250N000009 HC RX 250: Performed by: SURGERY

## 2020-11-22 PROCEDURE — 999N000139 HC STATISTIC PRE-PROCEDURE ASSESSMENT II: Performed by: SURGERY

## 2020-11-22 PROCEDURE — 87070 CULTURE OTHR SPECIMN AEROBIC: CPT | Performed by: SURGERY

## 2020-11-22 PROCEDURE — 88304 TISSUE EXAM BY PATHOLOGIST: CPT | Mod: 26 | Performed by: PATHOLOGY

## 2020-11-22 PROCEDURE — 360N000020 HC SURGERY LEVEL 3 1ST 30 MIN: Performed by: SURGERY

## 2020-11-22 PROCEDURE — 87077 CULTURE AEROBIC IDENTIFY: CPT | Performed by: SURGERY

## 2020-11-22 PROCEDURE — 80053 COMPREHEN METABOLIC PANEL: CPT | Performed by: HOSPITALIST

## 2020-11-22 PROCEDURE — 250N000013 HC RX MED GY IP 250 OP 250 PS 637: Performed by: PHYSICIAN ASSISTANT

## 2020-11-22 PROCEDURE — 250N000011 HC RX IP 250 OP 636: Performed by: NURSE ANESTHETIST, CERTIFIED REGISTERED

## 2020-11-22 PROCEDURE — 120N000001 HC R&B MED SURG/OB

## 2020-11-22 PROCEDURE — 88304 TISSUE EXAM BY PATHOLOGIST: CPT | Mod: TC | Performed by: SURGERY

## 2020-11-22 PROCEDURE — 47562 LAPAROSCOPIC CHOLECYSTECTOMY: CPT | Performed by: SURGERY

## 2020-11-22 PROCEDURE — 370N000002 HC ANESTHESIA TECHNICAL FEE, EACH ADDTL 15 MIN: Performed by: SURGERY

## 2020-11-22 PROCEDURE — 99232 SBSQ HOSP IP/OBS MODERATE 35: CPT | Performed by: HOSPITALIST

## 2020-11-22 PROCEDURE — 258N000001 HC RX 258: Performed by: SURGERY

## 2020-11-22 PROCEDURE — 0FB44ZZ EXCISION OF GALLBLADDER, PERCUTANEOUS ENDOSCOPIC APPROACH: ICD-10-PCS | Performed by: SURGERY

## 2020-11-22 PROCEDURE — 370N000001 HC ANESTHESIA TECHNICAL FEE, 1ST 30 MIN: Performed by: SURGERY

## 2020-11-22 PROCEDURE — 272N000001 HC OR GENERAL SUPPLY STERILE: Performed by: SURGERY

## 2020-11-22 PROCEDURE — 36415 COLL VENOUS BLD VENIPUNCTURE: CPT | Performed by: HOSPITALIST

## 2020-11-22 PROCEDURE — 250N000009 HC RX 250: Performed by: NURSE ANESTHETIST, CERTIFIED REGISTERED

## 2020-11-22 PROCEDURE — 250N000011 HC RX IP 250 OP 636: Performed by: SURGERY

## 2020-11-22 PROCEDURE — 761N000001 HC RECOVERY PHASE 1 LEVEL 1 FIRST HR: Performed by: SURGERY

## 2020-11-22 PROCEDURE — 360N000021 HC SURGERY LEVEL 3 EA 15 ADDTL MIN: Performed by: SURGERY

## 2020-11-22 PROCEDURE — 250N000011 HC RX IP 250 OP 636: Performed by: PHYSICIAN ASSISTANT

## 2020-11-22 PROCEDURE — 250N000003 HC SEVOFLURANE, EA 15 MIN: Performed by: SURGERY

## 2020-11-22 RX ORDER — HYDRALAZINE HYDROCHLORIDE 20 MG/ML
10 INJECTION INTRAMUSCULAR; INTRAVENOUS EVERY 4 HOURS PRN
Status: DISCONTINUED | OUTPATIENT
Start: 2020-11-22 | End: 2020-11-24 | Stop reason: HOSPADM

## 2020-11-22 RX ORDER — ONDANSETRON 2 MG/ML
INJECTION INTRAMUSCULAR; INTRAVENOUS PRN
Status: DISCONTINUED | OUTPATIENT
Start: 2020-11-22 | End: 2020-11-22

## 2020-11-22 RX ORDER — NALOXONE HYDROCHLORIDE 0.4 MG/ML
0.2 INJECTION, SOLUTION INTRAMUSCULAR; INTRAVENOUS; SUBCUTANEOUS
Status: DISCONTINUED | OUTPATIENT
Start: 2020-11-22 | End: 2020-11-22

## 2020-11-22 RX ORDER — LABETALOL HYDROCHLORIDE 5 MG/ML
10 INJECTION, SOLUTION INTRAVENOUS
Status: DISCONTINUED | OUTPATIENT
Start: 2020-11-22 | End: 2020-11-22 | Stop reason: HOSPADM

## 2020-11-22 RX ORDER — DEXAMETHASONE SODIUM PHOSPHATE 4 MG/ML
INJECTION, SOLUTION INTRA-ARTICULAR; INTRALESIONAL; INTRAMUSCULAR; INTRAVENOUS; SOFT TISSUE PRN
Status: DISCONTINUED | OUTPATIENT
Start: 2020-11-22 | End: 2020-11-22

## 2020-11-22 RX ORDER — FENTANYL CITRATE 50 UG/ML
INJECTION, SOLUTION INTRAMUSCULAR; INTRAVENOUS PRN
Status: DISCONTINUED | OUTPATIENT
Start: 2020-11-22 | End: 2020-11-22

## 2020-11-22 RX ORDER — SODIUM CHLORIDE, SODIUM LACTATE, POTASSIUM CHLORIDE, CALCIUM CHLORIDE 600; 310; 30; 20 MG/100ML; MG/100ML; MG/100ML; MG/100ML
INJECTION, SOLUTION INTRAVENOUS CONTINUOUS
Status: DISCONTINUED | OUTPATIENT
Start: 2020-11-22 | End: 2020-11-22 | Stop reason: HOSPADM

## 2020-11-22 RX ORDER — MAGNESIUM HYDROXIDE 1200 MG/15ML
LIQUID ORAL PRN
Status: DISCONTINUED | OUTPATIENT
Start: 2020-11-22 | End: 2020-11-22 | Stop reason: HOSPADM

## 2020-11-22 RX ORDER — CEFTRIAXONE 2 G/1
2 INJECTION, POWDER, FOR SOLUTION INTRAMUSCULAR; INTRAVENOUS EVERY 24 HOURS
Status: DISCONTINUED | OUTPATIENT
Start: 2020-11-22 | End: 2020-11-22

## 2020-11-22 RX ORDER — ONDANSETRON 2 MG/ML
4 INJECTION INTRAMUSCULAR; INTRAVENOUS EVERY 30 MIN PRN
Status: DISCONTINUED | OUTPATIENT
Start: 2020-11-22 | End: 2020-11-22 | Stop reason: HOSPADM

## 2020-11-22 RX ORDER — FENTANYL CITRATE 50 UG/ML
25-50 INJECTION, SOLUTION INTRAMUSCULAR; INTRAVENOUS
Status: DISCONTINUED | OUTPATIENT
Start: 2020-11-22 | End: 2020-11-22 | Stop reason: HOSPADM

## 2020-11-22 RX ORDER — SODIUM CHLORIDE, SODIUM LACTATE, POTASSIUM CHLORIDE, CALCIUM CHLORIDE 600; 310; 30; 20 MG/100ML; MG/100ML; MG/100ML; MG/100ML
INJECTION, SOLUTION INTRAVENOUS CONTINUOUS PRN
Status: DISCONTINUED | OUTPATIENT
Start: 2020-11-22 | End: 2020-11-22

## 2020-11-22 RX ORDER — NALOXONE HYDROCHLORIDE 0.4 MG/ML
0.4 INJECTION, SOLUTION INTRAMUSCULAR; INTRAVENOUS; SUBCUTANEOUS
Status: DISCONTINUED | OUTPATIENT
Start: 2020-11-22 | End: 2020-11-22

## 2020-11-22 RX ORDER — PROPOFOL 10 MG/ML
INJECTION, EMULSION INTRAVENOUS PRN
Status: DISCONTINUED | OUTPATIENT
Start: 2020-11-22 | End: 2020-11-22

## 2020-11-22 RX ORDER — ONDANSETRON 4 MG/1
4 TABLET, ORALLY DISINTEGRATING ORAL EVERY 30 MIN PRN
Status: DISCONTINUED | OUTPATIENT
Start: 2020-11-22 | End: 2020-11-22 | Stop reason: HOSPADM

## 2020-11-22 RX ORDER — PIPERACILLIN SODIUM, TAZOBACTAM SODIUM 2; .25 G/10ML; G/10ML
2.25 INJECTION, POWDER, LYOPHILIZED, FOR SOLUTION INTRAVENOUS EVERY 6 HOURS
Status: DISCONTINUED | OUTPATIENT
Start: 2020-11-22 | End: 2020-11-24 | Stop reason: DRUGHIGH

## 2020-11-22 RX ORDER — LIDOCAINE HYDROCHLORIDE 20 MG/ML
INJECTION, SOLUTION INFILTRATION; PERINEURAL PRN
Status: DISCONTINUED | OUTPATIENT
Start: 2020-11-22 | End: 2020-11-22

## 2020-11-22 RX ORDER — SPIRONOLACTONE 25 MG/1
25 TABLET ORAL DAILY
Status: DISCONTINUED | OUTPATIENT
Start: 2020-11-23 | End: 2020-11-24 | Stop reason: HOSPADM

## 2020-11-22 RX ADMIN — HYDRALAZINE HYDROCHLORIDE 10 MG: 20 INJECTION INTRAMUSCULAR; INTRAVENOUS at 16:52

## 2020-11-22 RX ADMIN — PROPOFOL 20 MG: 10 INJECTION, EMULSION INTRAVENOUS at 10:02

## 2020-11-22 RX ADMIN — PRAVASTATIN SODIUM 20 MG: 20 TABLET ORAL at 12:36

## 2020-11-22 RX ADMIN — PIPERACILLIN AND TAZOBACTAM 2.25 G: 2; .25 INJECTION, POWDER, FOR SOLUTION INTRAVENOUS at 08:38

## 2020-11-22 RX ADMIN — HYDROMORPHONE HYDROCHLORIDE 0.25 MG: 1 INJECTION, SOLUTION INTRAMUSCULAR; INTRAVENOUS; SUBCUTANEOUS at 10:57

## 2020-11-22 RX ADMIN — ROCURONIUM BROMIDE 50 MG: 10 INJECTION INTRAVENOUS at 09:28

## 2020-11-22 RX ADMIN — VERAPAMIL HYDROCHLORIDE 120 MG: 120 TABLET, FILM COATED, EXTENDED RELEASE ORAL at 22:11

## 2020-11-22 RX ADMIN — LOSARTAN POTASSIUM 50 MG: 50 TABLET, FILM COATED ORAL at 12:36

## 2020-11-22 RX ADMIN — FENTANYL CITRATE 100 MCG: 50 INJECTION, SOLUTION INTRAMUSCULAR; INTRAVENOUS at 09:28

## 2020-11-22 RX ADMIN — PIPERACILLIN AND TAZOBACTAM 2.25 G: 2; .25 INJECTION, POWDER, FOR SOLUTION INTRAVENOUS at 01:50

## 2020-11-22 RX ADMIN — BUPIVACAINE HYDROCHLORIDE AND EPINEPHRINE BITARTRATE 30 ML: 5; .005 INJECTION, SOLUTION EPIDURAL; INTRACAUDAL; PERINEURAL at 10:40

## 2020-11-22 RX ADMIN — PIPERACILLIN SODIUM AND TAZOBACTAM SODIUM 2.25 G: 2; .25 INJECTION, POWDER, LYOPHILIZED, FOR SOLUTION INTRAVENOUS at 12:36

## 2020-11-22 RX ADMIN — SUGAMMADEX 200 MG: 100 INJECTION, SOLUTION INTRAVENOUS at 10:51

## 2020-11-22 RX ADMIN — DEXMEDETOMIDINE HYDROCHLORIDE 8 MCG: 100 INJECTION, SOLUTION INTRAVENOUS at 09:42

## 2020-11-22 RX ADMIN — CARVEDILOL 3.12 MG: 3.12 TABLET, FILM COATED ORAL at 12:36

## 2020-11-22 RX ADMIN — LIDOCAINE HYDROCHLORIDE 100 MG: 20 INJECTION, SOLUTION INFILTRATION; PERINEURAL at 09:28

## 2020-11-22 RX ADMIN — DEXAMETHASONE SODIUM PHOSPHATE 4 MG: 4 INJECTION, SOLUTION INTRA-ARTICULAR; INTRALESIONAL; INTRAMUSCULAR; INTRAVENOUS; SOFT TISSUE at 09:40

## 2020-11-22 RX ADMIN — OXYCODONE HYDROCHLORIDE 5 MG: 5 TABLET ORAL at 14:30

## 2020-11-22 RX ADMIN — PIPERACILLIN SODIUM AND TAZOBACTAM SODIUM 2.25 G: 2; .25 INJECTION, POWDER, LYOPHILIZED, FOR SOLUTION INTRAVENOUS at 18:59

## 2020-11-22 RX ADMIN — SODIUM CHLORIDE 1000 ML: 900 IRRIGANT IRRIGATION at 09:47

## 2020-11-22 RX ADMIN — PROPOFOL 70 MG: 10 INJECTION, EMULSION INTRAVENOUS at 09:28

## 2020-11-22 RX ADMIN — HYDROMORPHONE HYDROCHLORIDE 0.2 MG: 1 INJECTION, SOLUTION INTRAMUSCULAR; INTRAVENOUS; SUBCUTANEOUS at 12:37

## 2020-11-22 RX ADMIN — INSULIN GLARGINE 10 UNITS: 100 INJECTION, SOLUTION SUBCUTANEOUS at 22:12

## 2020-11-22 RX ADMIN — ONDANSETRON 4 MG: 2 INJECTION INTRAMUSCULAR; INTRAVENOUS at 11:17

## 2020-11-22 RX ADMIN — OXYCODONE HYDROCHLORIDE 5 MG: 5 TABLET ORAL at 22:11

## 2020-11-22 RX ADMIN — OXYCODONE HYDROCHLORIDE 5 MG: 5 TABLET ORAL at 19:08

## 2020-11-22 RX ADMIN — SODIUM CHLORIDE, POTASSIUM CHLORIDE, SODIUM LACTATE AND CALCIUM CHLORIDE: 600; 310; 30; 20 INJECTION, SOLUTION INTRAVENOUS at 09:23

## 2020-11-22 RX ADMIN — CARVEDILOL 3.12 MG: 3.12 TABLET, FILM COATED ORAL at 18:57

## 2020-11-22 RX ADMIN — DEXMEDETOMIDINE HYDROCHLORIDE 12 MCG: 100 INJECTION, SOLUTION INTRAVENOUS at 09:54

## 2020-11-22 RX ADMIN — ONDANSETRON 4 MG: 2 INJECTION INTRAMUSCULAR; INTRAVENOUS at 10:44

## 2020-11-22 ASSESSMENT — COPD QUESTIONNAIRES
COPD: 1
CAT_SEVERITY: MILD

## 2020-11-22 ASSESSMENT — LIFESTYLE VARIABLES: TOBACCO_USE: 1

## 2020-11-22 ASSESSMENT — ACTIVITIES OF DAILY LIVING (ADL)
ADLS_ACUITY_SCORE: 11

## 2020-11-22 ASSESSMENT — ENCOUNTER SYMPTOMS
SEIZURES: 0
DYSRHYTHMIAS: 1

## 2020-11-22 NOTE — PROVIDER NOTIFICATION
MD Notification    Notified Person: MD    Notified Person Name: Dr. Holmanon    Notification Date/Time:11/21/2020 2226    Notification Interaction: Paged    Purpose of Notification: Low blood sugar-58, still give lantus? Also pt NPO at midnight, any change in IVF?    Orders Received:Pending    Comments: Orders to hold Lantus, switch fluids from NS at 100mL/hr to D5 NS at 25mL/hr. Continue to monitor Q4hrs.

## 2020-11-22 NOTE — PLAN OF CARE
Summary: Acute abdominal pain, POD 0 Lap jose   DATE & TIME: 11/22/2020 Day    Cognitive Concerns/ Orientation : Alert/Oriented x 4   BEHAVIOR & AGGRESSION TOOL COLOR: Green   ABNL VS/O2: VSS, room air  MOBILITY: SBA with IV pole  PAIN MANAGMENT: Incisional pain controlled w/ IV dilaudid x1, oxycodone x1  DIET: Clear liquids ADAT  BOWEL/BLADDER: Up to bathroom; BS hypoactive, flatus -, BM-   ABNL LAB/BG: Creatinine 1.31; , 170  DRAIN/DEVICES: R PIV SL w/ intm abx. LEO w/ brown/bile drainage.   SKIN: Bruising, 3 lap sites CDI. LEO dressing w/ some leakage, reinforced.  TESTS/PROCEDURES: N/A  D/C DAY/GOALS/PLACE: Pending progress  OTHER IMPORTANT INFO: Pt has deactivated insulin pump on abdomen and implanted glucose monitor on L upper arm. Blood cultures positive for gram negative rods (R arm + for E. Coli)  COMMIT TO SIT DONE AND SIGNED OFF YES

## 2020-11-22 NOTE — ANESTHESIA PREPROCEDURE EVALUATION
Anesthesia Pre-Procedure Evaluation    Patient: Tc Garcia   MRN: [id number] : 1940          Preoperative Diagnosis: Acute cholecystitis [K81.0]    Procedure(s):  CHOLECYSTECTOMY, LAPAROSCOPIC    Past Medical History:   Diagnosis Date     Anemia      Anemia of chronic disease 2020     Back pain      CKD (chronic kidney disease) stage 3, GFR 30-59 ml/min      CRF (chronic renal failure), stage 3  2020     Fall 2019    suffered multiple left rib fractures, C3 and T2 fractures     Mixed hyperlipidemia 2004     Paroxysmal atrial fibrillation (H)      Personal history of colonic polyps     gets colonoscopy every five years, due in      Pleural effusion on left 2019    after multiple rib fractures     Pulmonary hypertension (H) 5/10/2020    Added automatically from request for surgery [id number]     Recurrent Left Pleural effusion -- S/P Pleurex Cath 2019     Rosacea      Type II or unspecified type diabetes mellitus without mention of complication, not stated as uncontrolled      Unspecified essential hypertension      Past Surgical History:   Procedure Laterality Date     ANESTHESIA CARDIOVERSION N/A 2/3/2020    Procedure: ANESTHESIA, FOR CARDIOVERSION;  Surgeon: GENERIC ANESTHESIA PROVIDER;  Location:  OR     CV RIGHT HEART CATH N/A 2020    Procedure: Right Heart Cath;  Surgeon: Senthil Silva MD;  Location:  HEART CARDIAC CATH LAB     HC REMOVE TONSILS/ADENOIDS,<11 Y/O      T & A <11y.o.     IR CHEST TUBE DRAIN TUNNELED LEFT  2020     IR CHEST TUBE PLACEMENT NON-TUNNELLED LEFT  2020     IR CHEST TUBE REMOVAL NON TUNNELED LEFT  2020     IR CHEST TUBE REMOVAL TUNNELED LEFT  2020     LAPAROSCOPIC HERNIORRHAPHY INGUINAL BILATERAL Bilateral 10/27/2020    Procedure: LAPAROSCOPIC BILATERAL INGUINAL HERNIA REPAIR WITH MESH;  Surgeon: Brian Garsia MD;  Location:  OR     Allergies   Allergen Reactions     No Known Drug  Allergies      Social History     Tobacco Use     Smoking status: Former Smoker     Packs/day: 0.20     Years: 40.00     Pack years: 8.00     Types: Cigarettes     Start date:      Quit date: 2008     Years since quittin.9     Smokeless tobacco: Never Used     Tobacco comment: occasional   Substance Use Topics     Alcohol use: Not Currently     Alcohol/week: 35.0 standard drinks     Frequency: Never     Wt Readings from Last 1 Encounters:   20 68 kg (150 lb)      Prior to Admission medications    Medication Sig Start Date End Date Taking? Authorizing Provider   albuterol (PROAIR HFA/PROVENTIL HFA/VENTOLIN HFA) 108 (90 Base) MCG/ACT inhaler Inhale 2 puffs into the lungs every 4 hours as needed for shortness of breath / dyspnea or wheezing Inhale 2 puffs every 4 to 6 hours as needed.   Yes Unknown, Entered By History   apixaban ANTICOAGULANT (ELIQUIS) 2.5 MG tablet Take 1 tablet (2.5 mg) by mouth 2 times daily 20  Yes Ame Mejía PA-C   carvedilol (COREG) 6.25 MG tablet Take 0.5 tablets (3.125 mg) by mouth 2 times daily (with meals) 20  Yes Ame Mejía PA-C   furosemide (LASIX) 80 MG tablet Take 1 tablet (80 mg) by mouth daily 20  Yes Ame Mejía PA-C   losartan (COZAAR) 50 MG tablet Take 1 tablet (50 mg) by mouth daily 10/8/20  Yes Ame Mejía PA-C   LOVASTATIN 20 MG PO TABS 1 TABLET DAILY AT DINNER 7/26/10  Yes Tc Palacios MD   spironolactone (ALDACTONE) 25 MG tablet Take 1 tablet (25 mg) by mouth daily 20  Yes Ame Mejía PA-C   verapamil ER (VERELAN) 120 MG 24 hr capsule Take 1 capsule (120 mg) by mouth At Bedtime 20  Yes Ame Mejía PA-C   glucagon 1 MG kit 1 mg as needed for low blood sugar    Unknown, Entered By History   HYDROcodone-acetaminophen (NORCO) 5-325 MG tablet Take 1 tablet by mouth every 4 hours as needed for moderate to severe pain 20   FuehLore mims PA-C   insulin aspart  (NOVOLOG PEN) 100 UNIT/ML pen Inject 8 units subcutaneous with breakfast, 8 units subcutaneous with lunch and 4 units with supper. 5/29/20   Anny Beach MD   insulin glargine (LANTUS PEN) 100 UNIT/ML pen Inject 14 Units Subcutaneous every morning    Unknown, Entered By History   nystatin (MYCOSTATIN) 058145 UNIT/GM external cream Apply topically 2 times daily as needed     Unknown, Entered By History   senna-docusate (SENOKOT-S/PERICOLACE) 8.6-50 MG tablet Take 1-2 tablets by mouth 2 times daily as needed for constipation 10/27/20   Lore Parker PA-C     Current Facility-Administered Medications Ordered in Epic   Medication Dose Route Frequency Last Rate Last Admin     [Auto Hold] albuterol (PROAIR HFA/PROVENTIL HFA/VENTOLIN HFA) 108 (90 Base) MCG/ACT inhaler 2 puff  2 puff Inhalation Q4H PRN         [Auto Hold] carvedilol (COREG) tablet 3.125 mg  3.125 mg Oral BID w/meals   3.125 mg at 11/21/20 1808     cefTRIAXone (ROCEPHIN) 2 g vial to attach to  ml bag for ADULTS or NS 50 ml bag for PEDS  2 g Intravenous Q24H         dextrose 5% and 0.9% NaCl infusion   Intravenous Continuous 25 mL/hr at 11/22/20 0152 Rate Verify at 11/22/20 0152     [Auto Hold] glucose gel 15-30 g  15-30 g Oral Q15 Min PRN        Or     [Auto Hold] dextrose 50 % injection 25-50 mL  25-50 mL Intravenous Q15 Min PRN        Or     [Auto Hold] glucagon injection 1 mg  1 mg Subcutaneous Q15 Min PRN         [Auto Hold] HYDROmorphone (PF) (DILAUDID) injection 0.2 mg  0.2 mg Intravenous Q2H PRN         insulin aspart (NovoLOG) injection (RAPID ACTING)  1-4 Units Subcutaneous Q4H         [Held by provider] insulin glargine (LANTUS PEN) injection 10 Units  10 Units Subcutaneous At Bedtime         [Auto Hold] lidocaine (LMX4) cream   Topical Q1H PRN         [Auto Hold] lidocaine 1 % 0.1-1 mL  0.1-1 mL Other Q1H PRN         [Auto Hold] losartan (COZAAR) tablet 50 mg  50 mg Oral Daily   50 mg at 11/21/20 0818     [Auto Hold]  melatonin tablet 1 mg  1 mg Oral At Bedtime PRN         [Auto Hold] naloxone (NARCAN) injection 0.2 mg  0.2 mg Intravenous Q2 Min PRN        Or     [Auto Hold] naloxone (NARCAN) injection 0.4 mg  0.4 mg Intravenous Q2 Min PRN        Or     [Auto Hold] naloxone (NARCAN) injection 0.2 mg  0.2 mg Intramuscular Q2 Min PRN        Or     [Auto Hold] naloxone (NARCAN) injection 0.4 mg  0.4 mg Intramuscular Q2 Min PRN         [Auto Hold] ondansetron (ZOFRAN-ODT) ODT tab 4 mg  4 mg Oral Q6H PRN        Or     [Auto Hold] ondansetron (ZOFRAN) injection 4 mg  4 mg Intravenous Q6H PRN         [Auto Hold] oxyCODONE (ROXICODONE) tablet 5 mg  5 mg Oral Q3H PRN         [Auto Hold] polyethylene glycol (MIRALAX) Packet 17 g  17 g Oral Daily         [Auto Hold] pravastatin (PRAVACHOL) tablet 20 mg  20 mg Oral Daily   20 mg at 11/21/20 0818     [Auto Hold] prochlorperazine (COMPAZINE) injection 5 mg  5 mg Intravenous Q6H PRN        Or     [Auto Hold] prochlorperazine (COMPAZINE) tablet 5 mg  5 mg Oral Q6H PRN        Or     [Auto Hold] prochlorperazine (COMPAZINE) suppository 12.5 mg  12.5 mg Rectal Q12H PRN         [Auto Hold] senna-docusate (SENOKOT-S/PERICOLACE) 8.6-50 MG per tablet 1 tablet  1 tablet Oral BID        Or     [Auto Hold] senna-docusate (SENOKOT-S/PERICOLACE) 8.6-50 MG per tablet 2 tablet  2 tablet Oral BID         [Auto Hold] sodium chloride (PF) 0.9% PF flush 3 mL  3 mL Intracatheter q1 min prn   3 mL at 11/20/20 2228     [Auto Hold] sodium chloride (PF) 0.9% PF flush 3 mL  3 mL Intracatheter Q8H         [Auto Hold] verapamil ER (CALAN-SR) CR tablet 120 mg  120 mg Oral At Bedtime   120 mg at 11/21/20 2204     No current Epic-ordered outpatient medications on file.       dextrose 5% and 0.9% NaCl 25 mL/hr at 11/22/20 0152     Recent Labs   Lab Test 11/21/20  1146 11/20/20  1758    134   POTASSIUM 4.1 4.5   CHLORIDE 114* 104   CO2 19* 23   ANIONGAP 6 7   * 322*   BUN 23 32*   CR 1.44* 1.72*   LLOYD 6.9* 8.1*      Recent Labs   Lab Test 11/21/20  1146 11/20/20  1758   WBC 8.2 10.3   HGB 9.7* 8.3*    334     Recent Labs   Lab Test 10/30/19  1729   ABO O   RH Pos     Recent Labs   Lab Test 07/08/20  1223 05/21/20  1302 05/10/20  2207   TROPI <0.015 0.034 0.020     No results for input(s): PH, PCO2, PO2, HCO3 in the last 87295 hours.  No results for input(s): HCG in the last 72596 hours.  Recent Results (from the past 744 hour(s))   XR Chest 2 Views    Narrative    CHEST TWO VIEWS 11/20/2020 1:58 PM     HISTORY: WBC> 17.00. Diverticulitis.    COMPARISON: July 17, 2020       Impression    IMPRESSION: Minimal left pleural effusion and associated probable  atelectasis and/or fibrosis. Multiple rib fracture deformities again  evident with some healing callus present on the left. No right  effusion. The cardiac silhouette is stable. Pulmonary vasculature is  unremarkable.    ALLYSON MARTINEZ MD   CT Abdomen Pelvis w Contrast    Narrative    CT ABDOMEN AND PELVIS WITH CONTRAST 11/20/2020 2:02 PM    CLINICAL HISTORY: Abdominal pain, diarrhea, increased white blood cell  count. Rule out diverticulitis. Diverticulitis. Colitis.    TECHNIQUE: CT scan of the abdomen and pelvis was performed following  injection of IV contrast. Multiplanar reformats were obtained. Dose  reduction techniques were used.    CONTRAST: 75mL Isovue-370    COMPARISON: 11/9/2019    FINDINGS:   LOWER CHEST: Minimal pleural fluid and probable atelectasis at the  left base. Right lung clear.    HEPATOBILIARY: Marked gallbladder wall thickening and surrounding  inflammatory change indicating acute cholecystitis. No calcified  gallstones. Liver unremarkable.    PANCREAS: Either surgically absent or demonstrates extensive fatty  atrophy.    SPLEEN: Normal size.    ADRENAL GLANDS: No significant nodules.    KIDNEYS/BLADDER: 5 mm stone in the inferior left kidney. Right kidney  unremarkable. No hydronephrosis or bladder stones.    BOWEL: No obstruction or  inflammatory change. Mild diverticulosis. No  definite diverticulitis.    PELVIC ORGANS: No pelvic masses. Fluid in the left inguinal canal  noted.    ADDITIONAL FINDINGS: Small amount of pelvic free fluid. There are  extensive atherosclerotic changes of the visualized aorta and its  branches. There is no evidence of aortic dissection or aneurysm.    MUSCULOSKELETAL: Survey of the visualized bony structures demonstrates  no destructive bony lesions.      Impression    IMPRESSION:   1.  Gallbladder wall thickening and surrounding inflammatory change  compatible with acute cholecystitis. No calcified gallstones present.  2.  Trace pleural fluid and probable associated atelectasis at the  left base.  3.  Minimal fluid in the left inguinal canal of uncertain etiology and  significance.  4.  Stable 5 mm stone in the inferior left kidney.    ALLYSON MARTINEZ MD   US Abdomen Limited (RUQ)    Narrative    US ABDOMEN LIMITED   11/20/2020 7:57 PM     HISTORY: Right upper quadrant abdominal pain. CT concerning for  cholecystitis.    COMPARISON: None available.    FINDINGS:    Gallbladder: Gallbladder sludge. No shadowing stones. A few small  echogenic nonshadowing foci abutting the gallbladder wall, could  represent tumefactive sludge versus gallbladder polyp. Gallbladder  wall thickening measuring 5 mm. No significant pericholecystic fluid.  Sonographic Cortez sign is negative.    Bile ducts: CHD is normal diameter. No intrahepatic biliary  dilatation.    Liver: Demonstrates normal parenchymal echogenicity. No focal hepatic  mass visualized.    Pancreas: Partially obscured by overlying bowel gas,  but grossly  unremarkable.     Right kidney: No radiodense kidney stones or hydronephrosis.    Aorta and IVC: Not specifically assessed.       Impression    IMPRESSION: Gallbladder sludge with mild gallbladder wall thickening,  of indeterminate etiology, could be due to underlying cholecystitis.  Small echogenic, nonshadowing foci  abutting the gallbladder wall,  could represent tumefactive sludge versus gallbladder polyps.    LINK LUI MD     No results found for this or any previous visit (from the past 4320 hour(s)).      RECENT LABS:       Anesthesia Evaluation     .             ROS/MED HX    ENT/Pulmonary: Comment: Recurrent left pleural effusion s/p pleurx catheter insertion    (+)tobacco use, Past use mild COPD, , . .   (-) sleep apnea   Neurologic:      (-) seizures, CVA and TIA   Cardiovascular:     (+) Dyslipidemia, hypertension----. Taking blood thinners : . . . :. dysrhythmias a-fib, . pulmonary hypertension, Previous cardiac testing date:results:date: results:ECG reviewed date:10/2020 results:Atrial fibrillation date: results:         (-) CAD and CHF   METS/Exercise Tolerance:     Hematologic:     (+) Anemia, -      Musculoskeletal:         GI/Hepatic:     (+) cholecystitis/cholelithiasis,      (-) GERD   Renal/Genitourinary:     (+) chronic renal disease, type: CRI,       Endo:     (+) type II DM Using insulin - using insulin pump .   (-) Type I DM   Psychiatric:         Infectious Disease:         Malignancy:         Other:                          Physical Exam      Airway   Mallampati: II  TM distance: >3 FB  Neck ROM: full    Dental   (+) missing and chipped  Comment: Poor dentition    Cardiovascular   Rhythm and rate: irregular      Pulmonary    breath sounds clear to auscultation            Lab Results   Component Value Date    WBC 8.2 11/21/2020    HGB 9.7 (L) 11/21/2020    HCT 31.1 (L) 11/21/2020     11/21/2020    CRP 18.8 (H) 07/30/2020    SED 47 (H) 07/30/2020     11/21/2020    POTASSIUM 4.1 11/21/2020    CHLORIDE 114 (H) 11/21/2020    CO2 19 (L) 11/21/2020    BUN 23 11/21/2020    CR 1.44 (H) 11/21/2020     (H) 11/21/2020    LLOYD 6.9 (L) 11/21/2020    PHOS 3.5 06/16/2020    MAG 2.0 05/21/2020    ALBUMIN 2.5 (L) 11/21/2020    PROTTOTAL 5.2 (L) 11/21/2020    ALT 10 11/21/2020    AST 9  "11/21/2020    GGT 34 11/07/2019    ALKPHOS 69 11/21/2020    BILITOTAL 0.4 11/21/2020    LIPASE 46 (L) 11/20/2020    INR 1.50 (H) 11/21/2020    TSH 4.75 (H) 08/10/2020    T4 1.11 08/10/2020       Preop Vitals  BP Readings from Last 3 Encounters:   11/22/20 (!) 144/76   11/16/20 (!) 155/87   10/27/20 136/82    Pulse Readings from Last 3 Encounters:   11/22/20 60   11/16/20 75   10/27/20 98      Resp Readings from Last 3 Encounters:   11/22/20 16   10/27/20 20   10/01/20 16    SpO2 Readings from Last 3 Encounters:   11/22/20 96%   11/16/20 100%   10/27/20 98%      Temp Readings from Last 1 Encounters:   11/22/20 36.7  C (98.1  F) (Temporal)    Ht Readings from Last 1 Encounters:   11/20/20 1.753 m (5' 9\")      Wt Readings from Last 1 Encounters:   11/20/20 68 kg (150 lb)    Estimated body mass index is 22.15 kg/m  as calculated from the following:    Height as of this encounter: 1.753 m (5' 9\").    Weight as of this encounter: 68 kg (150 lb).       Anesthesia Plan      History & Physical Review  History and physical reviewed and following examination; no interval change.    ASA Status:  4 .    NPO Status:  > 8 hours    Plan for General (ETT) with Intravenous and Propofol induction. Maintenance will be Balanced.      Propofol gtt  Avoid midazolam if possible        Postoperative Care  Postoperative pain management:  Multi-modal analgesia.      Consents  Anesthetic plan, risks, benefits and alternatives discussed with:  Patient..                 Benny Chakraborty MD  "

## 2020-11-22 NOTE — PROGRESS NOTES
DATE & TIME: 11/21/2020 Night    Cognitive Concerns/ Orientation : Alert/Oriented x 4   BEHAVIOR & AGGRESSION TOOL COLOR: Green   ABNL VS/O2: VSS, room air  MOBILITY: SBA with IV pole  PAIN MANAGMENT: Denies pain/nausea  DIET: NPO except meds  BOWEL/BLADDER: Up to bathroom; Loose stool/diarrhea x 1  ABNL LAB/BG: Creatinine 1.44; GFR 45; Alb 2.5; Pro 5.2; Hgb 9.7; Hematocrit 31.1; BG (every 4 hours, finger stick) 99, 81  DRAIN/DEVICES: R PIV infusing D5 + normal saline at 25 mL/hour, IV Zosyn (compatible per Kai in pharmacy) every 6 hours  SKIN: Bruising  TESTS/PROCEDURES: Lap/jose scheduled for 0900 on 11/22  D/C DAY/GOALS/PLACE: Pending  OTHER IMPORTANT INFO: Pt has deactivated insulin pump on abdomen and implanted glucose monitor on L upper arm. Blood cultures positive for gram negative rods (R arm + for E. Coli)  COMMIT TO SIT DONE AND SIGNED OFF YES

## 2020-11-22 NOTE — BRIEF OP NOTE
[facility]    Brief Operative Note    Pre-operative diagnosis: Acute cholecystitis [K81.0]  Post-operative diagnosis Acute gangrenous cholecystitis with cholelithiasis    Procedure: Procedure(s):  CHOLECYSTECTOMY, LAPAROSCOPIC  Surgeon: Surgeon(s) and Role:     * Annette Lambert MD - Primary     * Bobbi Chandler PA-C - Assisting  Anesthesia: General   Estimated blood loss: * No values recorded between 11/22/2020  9:46 AM and 11/22/2020 10:57 AM *  Drains: Daryn-Rhodes  Specimens:   ID Type Source Tests Collected by Time Destination   1 : BILE Fluid Other ANAEROBIC BACTERIAL CULTURE Annette Lambert MD 11/22/2020  9:54 AM    2 : BILE Fluid Other FLUID CULTURE AEROBIC BACTERIAL Annette Lambert MD 11/22/2020  9:56 AM    A : GALLBLADDER AND CONTENTS Tissue Gallbladder and Contents SURGICAL PATHOLOGY EXAM Annette Lambert MD 11/22/2020  9:54 AM      Findings:   None.  Complications: None.  Implants: * No implants in log *     Bobbi Chandler PA-C

## 2020-11-22 NOTE — PROGRESS NOTES
[facility]    Hospitalist Progress Note    Date of Admission:  11/20/2020    Assessment & Plan     Tc Garcia is a 80 year old male with a history of type II DM, HTN, HLD, CKD 3, paroxysmal atrial fibrillation on Eliquis, HFpEF, pulmonary hypertension, reported history of medication confusion and noncompliance, recently underwent laparoscopic bilateral inguinal hernia repair presented to ED with 2 days of abdominal pain and outpatient CT scan concerning for acute cholecystitis.     E. coli sepsis due to acute gangrenous cholecystitis, s/p laparoscopic cholecystectomy on 11/22/2020  S/p laparoscopic bilateral inguinal hernia repair on 10/27/2020  Lactic acidosis likely secondary to dehydration/sepsis  Acute onset abdominal pain 2 days prior to presentation.  Seen by PCP and underwent CT abdomen pelvis that raised concern for acute cholecystitis.  Sent to ED.  Abdominal pain subsided in the ED however was nauseous and vomiting.  Right upper quadrant ultrasound showed findings indeterminate for acute cholecystitis.  Patient noted to be tachycardic with elevated lactate and growing E. coli on blood cultures.  Patient appeared sick with active emesis at presentation. Given fluids, Zosyn and Zofran and admitted for further management.  Normal WBC, lactate elevated at 2.2, normal lipase, normal bilirubin and LFTs.  Lactate normalized with IVF 1.0.  -Admit as inpatient  -Consult to general surgery.  Underwent laparoscopic cholecystectomy on 11/22/2020.  -Clear liquids started, advance as tolerated  -As needed Tylenol, oxycodone, Dilaudid, antiemetics available  -Continue IV Zosyn pending blood cultures and clinical progress.  May switch to ceftriaxone tomorrow pending intraoperative culture data.  -Hold PTA Lasix and spironolactone given lactic acidosis and dehydration.  Resume spironolactone 11/22.     Type II DM, last A1c 7.4, uncontrolled with hyperglycemia  Appears that patient is on insulin  pump at this time.  Discontinued insulin pump.  Patient has brittle diabetes.  Easily prone to hypoglycemia.  Monitor closely.  -Currently on 10 units Lantus with low dose sliding scale insulin.  Titrate as needed.     CKD 3  Baseline creatinine this year has ranged from 1.4-1.7.  Creatinine at admission is 1.72.  Appears to be at higher end of baseline.  -Holding diuretics and hydrated with IV NS.  Creatinine improved to 1.3.  Resume spironolactone 11/22.  -Monitor BMP.     HTN  Paroxysmal atrial fibrillation  HFpEF  His most recent echo from May 2020 shows preserved EF 55-60%, with normal wall motion. RV was mild to moderately dilated with mildly decreased RV function. He had a RHC during his hospital stay in May as below. He has since had recurrent hospitalizations in the setting of intermittent volume overload but also a recurrent pleural effusion/empyema.  -Continue PTA carvedilol, losartan, verapamil.  -Resume Eliquis when okay with surgery.  Hold for now.  -Hold PTA Lasix.  Resume spironolactone.     HLD  -Continue PTA statin     Anemia of chronic kidney disease  Hemoglobin at admission was 8.3.  Appears at baseline which has been around 8-10 this year.  -Continue to monitor intermittently.     History of multiple hospitalizations this year  History of medication confusion and noncompliance  Recurrent pleural effusions, empyema, hemothorax earlier this year  -Noted     DVT Prophylaxis: Pneumatic Compression Devices  Code Status: DNR / DNI  Expected discharge: 2 - 3 days, recommended to prior living arrangement once adequate pain management/ tolerating PO medications, cleared by surgery.             Sharri Nolasco MD  Text Page (7am - 6pm, M-F)    Interval History   Stable overnight.  Seen after surgery this morning.  States is feeling okay.  Denies nausea or vomiting.  Has some expected pain postop.  No fevers.    -Data reviewed today: I reviewed all new labs and imaging results over the last 24 hours. I  personally reviewed CT scan with result as noted above    Physical Exam   Temp: 96.2  F (35.7  C) Temp src: Oral BP: (!) 169/79 Pulse: 70   Resp: 18 SpO2: 100 % O2 Device: None (Room air) Oxygen Delivery: 3 LPM  Vitals:    11/20/20 1723   Weight: 68 kg (150 lb)     Vital Signs with Ranges  Temp:  [96.2  F (35.7  C)-98.6  F (37  C)] 96.2  F (35.7  C)  Pulse:  [56-71] 70  Resp:  [13-22] 18  BP: (120-169)/() 169/79  SpO2:  [96 %-100 %] 100 %  I/O last 3 completed shifts:  In: 2974 [P.O.:360; I.V.:2614]  Out: -     Constitutional: Alert, appears comfortable, in no acute distress  Respiratory: Non labored breathing, clear to auscultation bilaterally  Cardiovascular: Heart sounds regular rate and rhythm, no murmurs, no leg edema  GI: Abdomen is soft, non distended, non tender. Normal BS  Skin/Integumen: no rashes, no pressure sores  Neuro: alert, converses appropriately, moving all extremities, fluent speech, no facial asymmetry  Psych: mood and affect appropriate      Medications     dextrose 5% and 0.9% NaCl 25 mL/hr at 11/22/20 0152       carvedilol  3.125 mg Oral BID w/meals     insulin aspart  1-4 Units Subcutaneous Q4H     [Held by provider] insulin glargine  10 Units Subcutaneous At Bedtime     losartan  50 mg Oral Daily     piperacillin-tazobactam  2.25 g Intravenous Q6H     polyethylene glycol  17 g Oral Daily     pravastatin  20 mg Oral Daily     senna-docusate  1 tablet Oral BID    Or     senna-docusate  2 tablet Oral BID     sodium chloride (PF)  3 mL Intracatheter Q8H     verapamil ER  120 mg Oral At Bedtime       Data   Recent Labs   Lab 11/22/20  1242 11/21/20  1146 11/20/20  1758   WBC  --  8.2 10.3   HGB  --  9.7* 8.3*   MCV  --  85 85   PLT  --  344 334   INR  --  1.50*  --     139 134   POTASSIUM 4.3 4.1 4.5   CHLORIDE 112* 114* 104   CO2 22 19* 23   BUN 14 23 32*   CR 1.31* 1.44* 1.72*   ANIONGAP 5 6 7   LLOYD 7.0* 6.9* 8.1*   * 302* 322*   ALBUMIN 2.7* 2.5* 3.2*   PROTTOTAL 5.3* 5.2*  6.5*   BILITOTAL 0.5 0.4 0.4   ALKPHOS 66 69 95   ALT 13 10 12   AST 24 9 18   LIPASE  --   --  46*       Imaging  No results found for this or any previous visit (from the past 24 hour(s)).

## 2020-11-22 NOTE — PROVIDER NOTIFICATION
Brief update:    Paged regarding hypoglycemia    Patient is a brittle type II diabetic typically on an insulin pump which has been discontinued since admission.    Due for 10 units Lantus at bedtime, n.p.o. at midnight for shyann garcia in a.m.     Became hypoglycemic into the 50s range, has been improving after apple juice    Lantus placed on hold.    Initiating on D5 normal saline at 25/h.  Given patient's brittle diabetes, anticipate this might actually need to be discontinued overnight pending blood glucose trend.    Héctor Herron MD  10:44 PM

## 2020-11-22 NOTE — ANESTHESIA POSTPROCEDURE EVALUATION
Patient: Tc Garcia    Procedure(s):  CHOLECYSTECTOMY, LAPAROSCOPIC    Diagnosis:Acute cholecystitis [K81.0]  Diagnosis Additional Information: No value filed.    Anesthesia Type:  General    Note:  Anesthesia Post Evaluation    Patient location during evaluation: PACU  Patient participation: Able to fully participate in evaluation  Level of consciousness: sleepy but conscious and responsive to verbal stimuli  Pain management: adequate  Airway patency: patent  Cardiovascular status: acceptable and hemodynamically stable  Respiratory status: acceptable and unassisted  Hydration status: acceptable  PONV: none     Anesthetic complications: None          Last vitals:  Vitals:    11/22/20 1216 11/22/20 1244 11/22/20 1313   BP: (!) 168/77 (!) 160/93 (!) 168/102   Pulse: 71 69 67   Resp: 16 18 16   Temp: 36.3  C (97.3  F)  36.4  C (97.6  F)   SpO2: 99% 99% 99%         Electronically Signed By: Benny Chakraborty MD  November 22, 2020  1:24 PM

## 2020-11-22 NOTE — ANESTHESIA PROCEDURE NOTES
Airway   Date/Time: 11/22/2020 9:34 AM   Patient location during procedure: OR    Staff -   CRNA: Josefina Trevino APRN CRNA  Performed By: CRNA    Consent for Airway   Urgency: elective    Indications and Patient Condition  Indications for airway management: varghese-procedural  Induction type:intravenousMask difficulty assessment: 1 - vent by mask    Final Airway Details  Final airway type: endotracheal airway  Successful airway:ETT - single  Endotracheal Airway Details   ETT size (mm): 8.0  Cuffed: yes  Cuff volume (mL): 10  Successful intubation technique: video laryngoscopy  Grade View of Cords: 1  Adjucts: stylet  Measured from: gums/teeth  Secured at (cm): 23  Secured with: pink tape  Bite block used: None    Post intubation assessment   Placement verified by: capnometry, equal breath sounds and chest rise   Number of attempts at approach: 1  Secured with:pink tape  Ease of procedure: easy  Dentition: Intact and Unchanged

## 2020-11-22 NOTE — ANESTHESIA CARE TRANSFER NOTE
Patient: Tc Garcia    Procedure(s):  CHOLECYSTECTOMY, LAPAROSCOPIC    Diagnosis: Acute cholecystitis [K81.0]  Diagnosis Additional Information: No value filed.    Anesthesia Type:   General     Note:  Airway :Face Mask  Patient transferred to:PACU  Comments: Neuromuscular blockade reversed after TOF 4/4, spontaneous respirations, adequate tidal volumes, followed commands to voice, oropharynx suctioned with soft flexible catheter, extubated atraumatically, extubated with suction, airway patent after extubation.  Oxygen via facemask at 8 liters per minute to PACU. Oxygen tubing connected to wall O2 in PACU, SpO2, NiBP, and EKG monitors and alarms on and functioning, Ade Hugger warmer connected to patient gown, report on patient's clinical status given to PACU RN, RN questions answered.   Handoff Report: Identifed the Patient, Identified the Reponsible Provider, Reviewed the pertinent medical history, Discussed the surgical course, Reviewed Intra-OP anesthesia mangement and issues during anesthesia, Set expectations for post-procedure period and Allowed opportunity for questions and acknowledgement of understanding      Vitals: (Last set prior to Anesthesia Care Transfer)    CRNA VITALS  11/22/2020 1030 - 11/22/2020 1110      11/22/2020             Resp Rate (set):  10                Electronically Signed By: OMAR Waldrop CRNA  November 22, 2020  11:10 AM

## 2020-11-22 NOTE — PROVIDER NOTIFICATION
MD Nolasco paged for pt's BP in the 180's. Pt has restarted oral medications after Lap-Coly this afternoon.

## 2020-11-22 NOTE — PLAN OF CARE
Cognitive Concerns/ Orientation : Alert/Oriented x 4   BEHAVIOR & AGGRESSION TOOL COLOR: Green   ABNL VS/O2: VSS on RA  MOBILITY: SBA using IV pole  PAIN MANAGMENT: Denies  DIET: Clear liquids, NPO at midnight   BOWEL/BLADDER: Up to bathroom. Voiding appropriately, pt reporting loose stools, MD aware. On clear liquids.   ABNL LAB/BG: , 58,79,87. Q4h checks using pt's own BG monitor. MD paged. Will hold Glargine 10u tonight, switch fluids from NS at 100mL/hr to D5 NS at 25mL/hr. Crea 1.44, Alb 2.5, A1c 7.7, INR 1.50.   DRAIN/DEVICES: R PIV infusing normal saline at 100 mL/hour, IV Zosyn (Q6 hours)  SKIN: Bruising, pt reported pre-op wipe down completed on days.   TESTS/PROCEDURES: Surgery consult, plan for lap jose tomorrow 11/22 at 0900. NPO at midnight. Blood cultures positive for gram negative rods  D/C DAY/GOALS/PLACE: Pending  OTHER IMPORTANT INFO: Pt has deactivated insulin pump on abdomen; Implanted glucometer in upper left shoulder. Calls as needed. Pain and nausea well managed and in control.

## 2020-11-23 ENCOUNTER — APPOINTMENT (OUTPATIENT)
Dept: PHYSICAL THERAPY | Facility: CLINIC | Age: 80
DRG: 854 | End: 2020-11-23
Attending: HOSPITALIST
Payer: COMMERCIAL

## 2020-11-23 ENCOUNTER — CARE COORDINATION (OUTPATIENT)
Dept: CARDIOLOGY | Facility: CLINIC | Age: 80
End: 2020-11-23

## 2020-11-23 LAB
ANION GAP SERPL CALCULATED.3IONS-SCNC: 7 MMOL/L (ref 3–14)
BACTERIA SPEC CULT: ABNORMAL
BUN SERPL-MCNC: 18 MG/DL (ref 7–30)
CALCIUM SERPL-MCNC: 7 MG/DL (ref 8.5–10.1)
CHLORIDE SERPL-SCNC: 111 MMOL/L (ref 94–109)
CO2 SERPL-SCNC: 20 MMOL/L (ref 20–32)
CREAT SERPL-MCNC: 1.32 MG/DL (ref 0.66–1.25)
ERYTHROCYTE [DISTWIDTH] IN BLOOD BY AUTOMATED COUNT: 17.2 % (ref 10–15)
GFR SERPL CREATININE-BSD FRML MDRD: 50 ML/MIN/{1.73_M2}
GLUCOSE BLDC GLUCOMTR-MCNC: 263 MG/DL (ref 70–99)
GLUCOSE BLDC GLUCOMTR-MCNC: 273 MG/DL (ref 70–99)
GLUCOSE BLDC GLUCOMTR-MCNC: 289 MG/DL (ref 70–99)
GLUCOSE BLDC GLUCOMTR-MCNC: 369 MG/DL (ref 70–99)
GLUCOSE BLDC GLUCOMTR-MCNC: 390 MG/DL (ref 70–99)
GLUCOSE BLDC GLUCOMTR-MCNC: 421 MG/DL (ref 70–99)
GLUCOSE SERPL-MCNC: 279 MG/DL (ref 70–99)
HCT VFR BLD AUTO: 34 % (ref 40–53)
HGB BLD-MCNC: 10.7 G/DL (ref 13.3–17.7)
MCH RBC QN AUTO: 26.3 PG (ref 26.5–33)
MCHC RBC AUTO-ENTMCNC: 31.5 G/DL (ref 31.5–36.5)
MCV RBC AUTO: 84 FL (ref 78–100)
PLATELET # BLD AUTO: 394 10E9/L (ref 150–450)
POTASSIUM SERPL-SCNC: 4.5 MMOL/L (ref 3.4–5.3)
RBC # BLD AUTO: 4.07 10E12/L (ref 4.4–5.9)
SODIUM SERPL-SCNC: 138 MMOL/L (ref 133–144)
SPECIMEN SOURCE: ABNORMAL
SPECIMEN SOURCE: ABNORMAL
WBC # BLD AUTO: 13.3 10E9/L (ref 4–11)

## 2020-11-23 PROCEDURE — 250N000012 HC RX MED GY IP 250 OP 636 PS 637: Performed by: HOSPITALIST

## 2020-11-23 PROCEDURE — 36415 COLL VENOUS BLD VENIPUNCTURE: CPT | Performed by: SURGERY

## 2020-11-23 PROCEDURE — 250N000013 HC RX MED GY IP 250 OP 250 PS 637: Performed by: PHYSICIAN ASSISTANT

## 2020-11-23 PROCEDURE — 120N000001 HC R&B MED SURG/OB

## 2020-11-23 PROCEDURE — 250N000011 HC RX IP 250 OP 636: Performed by: PHYSICIAN ASSISTANT

## 2020-11-23 PROCEDURE — 85027 COMPLETE CBC AUTOMATED: CPT | Performed by: SURGERY

## 2020-11-23 PROCEDURE — 250N000013 HC RX MED GY IP 250 OP 250 PS 637: Performed by: HOSPITALIST

## 2020-11-23 PROCEDURE — 80048 BASIC METABOLIC PNL TOTAL CA: CPT | Performed by: SURGERY

## 2020-11-23 PROCEDURE — 97161 PT EVAL LOW COMPLEX 20 MIN: CPT | Mod: GP | Performed by: PHYSICAL THERAPIST

## 2020-11-23 PROCEDURE — 97116 GAIT TRAINING THERAPY: CPT | Mod: GP | Performed by: PHYSICAL THERAPIST

## 2020-11-23 PROCEDURE — 99232 SBSQ HOSP IP/OBS MODERATE 35: CPT | Performed by: HOSPITALIST

## 2020-11-23 PROCEDURE — 999N001017 HC STATISTIC GLUCOSE BY METER IP

## 2020-11-23 RX ORDER — ACETAMINOPHEN 325 MG/1
975 TABLET ORAL EVERY 8 HOURS
Status: DISCONTINUED | OUTPATIENT
Start: 2020-11-23 | End: 2020-11-24 | Stop reason: HOSPADM

## 2020-11-23 RX ADMIN — POLYETHYLENE GLYCOL 3350 17 G: 17 POWDER, FOR SOLUTION ORAL at 08:14

## 2020-11-23 RX ADMIN — INSULIN GLARGINE 5 UNITS: 100 INJECTION, SOLUTION SUBCUTANEOUS at 09:50

## 2020-11-23 RX ADMIN — ACETAMINOPHEN 975 MG: 325 TABLET, FILM COATED ORAL at 18:20

## 2020-11-23 RX ADMIN — ACETAMINOPHEN 975 MG: 325 TABLET, FILM COATED ORAL at 11:22

## 2020-11-23 RX ADMIN — PRAVASTATIN SODIUM 20 MG: 20 TABLET ORAL at 08:13

## 2020-11-23 RX ADMIN — DOCUSATE SODIUM 50 MG AND SENNOSIDES 8.6 MG 1 TABLET: 8.6; 5 TABLET, FILM COATED ORAL at 22:07

## 2020-11-23 RX ADMIN — PIPERACILLIN SODIUM AND TAZOBACTAM SODIUM 2.25 G: 2; .25 INJECTION, POWDER, LYOPHILIZED, FOR SOLUTION INTRAVENOUS at 12:35

## 2020-11-23 RX ADMIN — OXYCODONE HYDROCHLORIDE 5 MG: 5 TABLET ORAL at 01:29

## 2020-11-23 RX ADMIN — CARVEDILOL 3.12 MG: 3.12 TABLET, FILM COATED ORAL at 18:21

## 2020-11-23 RX ADMIN — PIPERACILLIN SODIUM AND TAZOBACTAM SODIUM 2.25 G: 2; .25 INJECTION, POWDER, LYOPHILIZED, FOR SOLUTION INTRAVENOUS at 18:21

## 2020-11-23 RX ADMIN — SPIRONOLACTONE 25 MG: 25 TABLET, FILM COATED ORAL at 08:13

## 2020-11-23 RX ADMIN — DOCUSATE SODIUM 50 MG AND SENNOSIDES 8.6 MG 2 TABLET: 8.6; 5 TABLET, FILM COATED ORAL at 08:13

## 2020-11-23 RX ADMIN — CARVEDILOL 3.12 MG: 3.12 TABLET, FILM COATED ORAL at 08:14

## 2020-11-23 RX ADMIN — OXYCODONE HYDROCHLORIDE 5 MG: 5 TABLET ORAL at 19:22

## 2020-11-23 RX ADMIN — PIPERACILLIN SODIUM AND TAZOBACTAM SODIUM 2.25 G: 2; .25 INJECTION, POWDER, LYOPHILIZED, FOR SOLUTION INTRAVENOUS at 00:49

## 2020-11-23 RX ADMIN — INSULIN GLARGINE 10 UNITS: 100 INJECTION, SOLUTION SUBCUTANEOUS at 22:07

## 2020-11-23 RX ADMIN — PIPERACILLIN SODIUM AND TAZOBACTAM SODIUM 2.25 G: 2; .25 INJECTION, POWDER, LYOPHILIZED, FOR SOLUTION INTRAVENOUS at 06:16

## 2020-11-23 RX ADMIN — OXYCODONE HYDROCHLORIDE 5 MG: 5 TABLET ORAL at 15:59

## 2020-11-23 RX ADMIN — VERAPAMIL HYDROCHLORIDE 120 MG: 120 TABLET, FILM COATED, EXTENDED RELEASE ORAL at 22:07

## 2020-11-23 RX ADMIN — LOSARTAN POTASSIUM 50 MG: 50 TABLET, FILM COATED ORAL at 08:14

## 2020-11-23 RX ADMIN — OXYCODONE HYDROCHLORIDE 5 MG: 5 TABLET ORAL at 11:22

## 2020-11-23 ASSESSMENT — ACTIVITIES OF DAILY LIVING (ADL)
ADLS_ACUITY_SCORE: 11

## 2020-11-23 NOTE — CONSULTS
Care Management Initial Consult  Pt's PCP is [facility].  This clinic is not on [facility], so will send the Discharge Summary when available.    His Cardiologist is Dr Julien.  He is also managed by the [facility] and he calls his weights in daily.  They are aware he is currently hospitalized.    He has an Endocrinologist and uses an insulin pump.    In discussion with pt., find he is forgetful and it was noted history with medication confusion and noncompliance.  Pt reports he had a home care nurse in the past.  He does not want a home care nurse post discharge to help with medication set-up.  This may be of benefit, as he has had some recent changes with BP meds.    Pt's son had stair lifts installed on both of their stairs.  Pt noted he and his spouse are very happy with this.      General Information  Assessment completed with: Patient,    Type of CM/SW Visit: Initial Assessment  Primary Care Provider verified and updated as needed: Yes(Chart updated)   Readmission within the last 30 days: current reason for admission unrelated to previous admission   Return Category: New Diagnosis  Reason for Consult: care coordination/care conference;discharge planning     High readmission risk    Communication Assessment  Patient's communication style: spoken language (English or Bilingual)    Hearing Difficulty or Deaf: no   Wear Glasses or Blind: yes    Cognitive  Cognitive/Neuro/Behavioral: WDL  Level of Consciousness: alert     Orientation: oriented x 4  Mood/Behavior: calm;cooperative  Best Language: 0 - No aphasia  Speech: clear;logical;spontaneous   Pt is forgetful    Living Environment:   People in home: spouse     Current living Arrangements: house      Able to return to prior arrangements: yes  Living Arrangement Comments: Pt has a stair lift on both stairs in their home    Family/Social Support:  Marital Status:   Wife;Children                 Current Resources:   Skilled  Home Care Services:   Pt reports recently had a home care nurse.  He does not feel he needs this at the current time  Community Resources: None  Equipment currently used at home: other (see comments)  Supplies currently used at home: (stair lift)    Employment/Financial:     Financial Concerns: No concerns identified           Lifestyle & Psychosocial Needs:          Tobacco Use     Smoking status: Former Smoker     Packs/day: 0.20     Years: 40.00     Pack years: 8.00     Types: Cigarettes     Start date:      Quit date: 2008     Years since quittin.9     Smokeless tobacco: Never Used     Tobacco comment: occasional   Substance and Sexual Activity     Alcohol use: Not Currently     Alcohol/week: 35.0 standard drinks     Frequency: Never     Drug use: Not Currently       Functional Status:  Prior to admission patient needed assistance: No           Values/Beliefs:  Spiritual, Cultural Beliefs, Taoist Practices, Values that affect care:      LINDSEY Dooley, RN

## 2020-11-23 NOTE — PLAN OF CARE
Cognitive Concerns/ Orientation : Alert/Oriented x 4. Capno removed after pt request.   BEHAVIOR & AGGRESSION TOOL COLOR: Green   ABNL VS/O2: VSS with BP in the 180's-160's. Scheduled meds continued after procedure. Hydralazine IV 10mg given x1.  On room air.   MOBILITY: SBA with GB   PAIN MANAGMENT: Incisional pain controlled w/ oxycodone x2  DIET: Advanced to Mod-carb, good appetite and oral intake.   BOWEL/BLADDER: Up to bathroom, voiding appropriately.   ABNL LAB/BG: Creatinine 1.31; , 285.   DRAIN/DEVICES: R PIV SL w/ intm abx. LEO w/ brown/bile drainage. Dressing replaced with some weeping noted at incisional site,   SKIN: Bruising, 3 lap sites CDI. ELO dressing w/ some leakage, reinforced.  TESTS/PROCEDURES: N/A  D/C DAY/GOALS/PLACE: Pending progress  OTHER IMPORTANT INFO: Pt has deactivated insulin pump on abdomen and implanted glucose monitor on L upper arm. Blood cultures positive for gram negative rods (R arm + for E. Coli)

## 2020-11-23 NOTE — PROGRESS NOTES
DATE & TIME: 11/22/2020 Night    Cognitive Concerns/ Orientation : alert/oriented x 4   BEHAVIOR & AGGRESSION TOOL COLOR: Green   ABNL VS/O2: /93 (PRN Hydralazine for SBP > 180) otherwise VSS, room air  MOBILITY: SBA, gait belt  PAIN MANAGMENT: Oxycodone given x1 for incisional pain  DIET: Mod carb  BOWEL/BLADDER: Up to bathroom  ABNL LAB/BG: Creat 1.31; GFR 50; Alb 2.7; Pro 5.3, BG (Q4 hour checks) 289, 263 (2 units Novolog given each time)  DRAIN/DEVICES: R PIV saline locked, IV Zosyn (Q6 hours); LEO drain from Lap/Chloe 11/22-small amount of tan/pink tinged output  SKIN: 3 abdominal incisions covered with adhesive bandages, clean, dry, intact; 1 RUQ drain incision serosanguinous drainage-dressing replaced x 2 with minimal LEO output  TESTS/PROCEDURES: n/a  D/C DAY/GOALS/PLACE: Pending  OTHER IMPORTANT INFO: Pt has deactivated insulin pump on abdomen and implanted glucose monitor on UL arm. Positive blood cultures for gram negative rods (R arm positive for E. Coli)  COMMIT TO SIT DONE AND SIGNED OFF YES

## 2020-11-23 NOTE — PLAN OF CARE
DATE & TIME: 11/23/2020 7488-5145   Cognitive Concerns/ Orientation : A&Ox4.   BEHAVIOR & AGGRESSION TOOL COLOR: Green   ABNL VS/O2: VSS, room air  MOBILITY: SBA, gait belt  PAIN MANAGMENT: Oxycodone given x1 for Abd incisional pain  DIET: Mod carb diet   BOWEL/BLADDER: Up to bathroom  ABNL LAB/BG: Cr 1.32 (1.31 yesterday) , 390, WBC 13.3, Hgb 10.7,   DRAIN/DEVICES: R PIV saline locked, LEO drain from Lap/Chloe.   SKIN: Lap chloe sites x3 with band aids, CDI. Moderate blood tinged drainage from LEO drain site, dressing replaced x 2 with minimal LEO output, Surgery aware of leakage.   TESTS/PROCEDURES: NA   D/C DAY/GOALS/PLACE: 2-3 days.   OTHER IMPORTANT INFO: POD #1. Positive blood cultures for gram negative rods (R arm positive for E. Coli). On IV Zosyn q6h. PTA Eliquis on hold, possibly resuming tomorrow per surgery note. PT/OT following.   COMMIT TO SIT DONE AND SIGNED OFF YES

## 2020-11-23 NOTE — PROGRESS NOTES
11/23/20 0922   Quick Adds   Type of Visit Initial PT Evaluation   Living Environment   People in home spouse  (Radha)   Current Living Arrangements house   Home Accessibility stairs to enter home;stairs within home   Number of Stairs, Main Entrance 3   Stair Railings, Main Entrance railing on left side (ascending)   Number of Stairs, Within Home, Primary   (Chair lift to basement and upstairs)   Transportation Anticipated car, drives self;family or friend will provide   Living Environment Comments Pt's spouse is able to assist at time of discharge.    Self-Care   Usual Activity Tolerance good   Current Activity Tolerance moderate   Regular Exercise Yes   Activity/Exercise Type strength training   Equipment Currently Used at Home none   Activity/Exercise/Self-Care Comment Pt owns FWW.   Disability/Function   Fall history within last six months no   General Information   Onset of Illness/Injury or Date of Surgery 11/20/20   Referring Physician Sharri Nolasco MD   Patient/Family Therapy Goals Statement (PT) Return home.    Pertinent History of Current Problem (include personal factors and/or comorbidities that impact the POC) 79 y/o male admitted with lower abdominal pain, noted to have acute cholecystitis, now POD # 1 lap cholecystectomy. PMH including DM 2, HTN, hyperlipidemia, CKD.    Existing Precautions/Restrictions fall   General Observations Pt in supine upon arrival of therapist.   Cognition   Orientation Status (Cognition) oriented x 3   Affect/Mental Status (Cognition) WFL   Follows Commands (Cognition) WFL   Pain Assessment   Patient Currently in Pain   (Abdominal incision and deep pain of 6/10 )   Integumentary/Edema   Integumentary/Edema Comments Abdominal incision sites not observed.    Posture    Posture Comments Noted forward head and shoulder posture upon sitting EOB and standing.    Range of Motion (ROM)   ROM Comment B LEs WFL.     Strength   Strength Comments B LEs grossly 3/5 with  functional mobility.    Bed Mobility   Comment (Bed Mobility) Supine <> sit independently.    Transfers   Transfer Safety Comments Sit <> stand with no AD independently.   Gait/Stairs (Locomotion)   Comment (Gait/Stairs) Pt amb 5' with no AD and SBA.    Balance   Balance Comments Noted good seated and standing balance at FWW.    Sensory Examination   Sensory Perception Comments Pt reports numbness/tingling in B feet up to ankles at baseline.    Clinical Impression   Criteria for Skilled Therapeutic Intervention yes, treatment indicated   PT Diagnosis (PT) Difficulty with gait.    Influenced by the following impairments Generalized weakness, Decreased activity tolerance   Functional limitations due to impairments Limited functional mobility requiring AD and assist.    Clinical Presentation Stable/Uncomplicated   Clinical Presentation Rationale Based on PMH, current presentation, and social support   Clinical Decision Making (Complexity) low complexity   Therapy Frequency (PT) Daily   Predicted Duration of Therapy Intervention (days/wks) 3 days   Planned Therapy Interventions (PT) bed mobility training;gait training;stair training;strengthening;transfer training   Risk & Benefits of therapy have been explained patient   PT Discharge Planning    PT Discharge Recommendation (DC Rec) home with assist   PT Rationale for DC Rec Pt is near baseline in regards to functional mobility, limited by pain and decreased activity tolerance. Pt reports his spouse is able to assist as needed upon discharge. Pt is currently requiring SBA for functional ambulation.   Total Evaluation Time   Total Evaluation Time (Minutes) 5

## 2020-11-23 NOTE — PROGRESS NOTES
"[facility]  GENERAL SURGERY Progress Note    Admission Date: 2020         Assessment and Plan:   Tc Garcia is a 80 year old male S/P Procedure(s): challenging lap jose, POD 1    - ADAT  - Pain controlled with oxy prn, add Tylenol ATC  - WBC 13.3, bile cx +E coli, continue Zosyn  - Continue LEO, will remove prior to discharge, dressing changes daily, reinforce dressing as needed  - Pepcid  - Ok to resume Eliquis tomorrow from surgery standpoint  - Ambulate with assistance and 4x day and encourage IS  - Med mngt per hospitalist, appreciate your help             Interval History:   Pain controlled, tolerating diet, +Flatus, UO adequate, up some, PT consulted.                     Physical Exam:   Blood pressure 132/76, pulse 73, temperature 97.9  F (36.6  C), temperature source Oral, resp. rate 19, height 1.753 m (5' 9\"), weight 68 kg (150 lb), SpO2 96 %.  Temperature Temp  Av.4  F (36.3  C)  Min: 96.2  F (35.7  C)  Max: 97.9  F (36.6  C)   I/O last 3 completed shifts:  In: -   Out: 22 [Drains:22]  Constitutional:  Awake, alert, oriented, and in no apparent distress.   Abdomen: Soft, non-distended, appropriately tender at incision(s), + BS. LEO drain intact, serosanguinous.   Wound(s): Clean, dry, and intact. No erythema or drainage.    Extremities: No edema or calf tenderness. +SCDs          Data:     Recent Labs   Lab Test 20  0617 20  1146 20  1758   WBC 13.3* 8.2 10.3   HGB 10.7* 9.7* 8.3*   HCT 34.0* 31.1* 26.1*    344 334      Recent Labs   Lab Test 20  0617 20  1242 20  1146    139 139   POTASSIUM 4.5 4.3 4.1   CHLORIDE 111* 112* 114*   CO2 20 22 19*   BUN 18 14 23   CR 1.32* 1.31* 1.44*       YANA IqbalC  [facility]  [phone]  "

## 2020-11-23 NOTE — PROGRESS NOTES
Can hold off on scheduling zio monitor for the moment, as some meds may change during his inpatient course. Will reassess when he returns to see Dr. Julien. Thanks.

## 2020-11-23 NOTE — PROGRESS NOTES
[facility]  Received VM from Alyssa, care coordinator at [facility] (ph.[phone]) to confirm pt is on telemanagement and to find out what services he has through [facility]. Per chart, pt is currently admitted for E.coli sepsis due to acute gangrenous cholecystitis (s/p laparoscopic cholecystectomy on 11/22).     Returned call to Alyssa, reviewed pt calls is weights into [facility], and follows in [facility], but no other services through us. Reviewed he was to have lab, BP check, and 48Hr holter today, but since admitted, those won't be completed. BP being taken in hospital, as are labs. Will message scheduling to reschedule 48Hr holter once pt discharged. Will send FYI to FORTUNATO Ortiz. Pt has visit with Dr. Julien 12/9/20.     BAYRON ChavarriaN, RN, CHFN  11/23/20 at 3:25 PM   ?

## 2020-11-23 NOTE — PROGRESS NOTES
[facility]    Hospitalist Progress Note    Date of Admission:  11/20/2020    Assessment & Plan     Tc Garcia is a 80 year old male with a history of type II DM, HTN, HLD, CKD 3, paroxysmal atrial fibrillation on Eliquis, HFpEF, pulmonary hypertension, reported history of medication confusion and noncompliance, recently underwent laparoscopic bilateral inguinal hernia repair presented to ED with 2 days of abdominal pain and outpatient CT scan concerning for acute cholecystitis.     E. coli sepsis due to acute gangrenous cholecystitis, s/p laparoscopic cholecystectomy on 11/22/2020  S/p laparoscopic bilateral inguinal hernia repair on 10/27/2020  Lactic acidosis likely secondary to dehydration/sepsis  Acute onset abdominal pain 2 days prior to presentation.  Seen by PCP and underwent CT abdomen pelvis that raised concern for acute cholecystitis.  Sent to ED.  Abdominal pain subsided in the ED however was nauseous and vomiting.  Right upper quadrant ultrasound showed findings indeterminate for acute cholecystitis.  Patient noted to be tachycardic with elevated lactate and growing E. coli on blood cultures.  Patient appeared sick with active emesis at presentation. Given fluids, Zosyn and Zofran and admitted for further management.  Normal WBC, lactate elevated at 2.2, normal lipase, normal bilirubin and LFTs.  Lactate normalized with IVF 1.0.  -Admit as inpatient  -Consult to general surgery.  Underwent laparoscopic cholecystectomy on 11/22/2020.  -Diet has been advanced post surgery and patient is tolerating well.  Has not had a BM yet post surgery.  -As needed Tylenol, oxycodone, Dilaudid, antiemetics available  -Continue IV Zosyn per surgery recommendation.  E. coli on blood cultures, biliary fluid growing E. coli as well as Serratia.  Anticipate switching to likely Augmentin at discharge.  -Hold PTA Lasix and spironolactone given lactic acidosis and dehydration.  Resumed spironolactone  11/22.     Type II DM, last A1c 7.4, uncontrolled with hyperglycemia  Appears that patient is on insulin pump at this time.  Discontinued insulin pump.  Patient has brittle diabetes.  Easily prone to hypoglycemia.  Monitor closely.  -Currently on 15 units Lantus with low dose sliding scale insulin.  Titrate as needed.     CKD 3  Baseline creatinine this year has ranged from 1.4-1.7.  Creatinine at admission is 1.72.  Appears to be at higher end of baseline.  -Holding diuretics and hydrated with IV NS.  Creatinine improved to 1.3.  Resumed spironolactone 11/22.  -Monitor BMP.     HTN  Paroxysmal atrial fibrillation  HFpEF  His most recent echo from May 2020 shows preserved EF 55-60%, with normal wall motion. RV was mild to moderately dilated with mildly decreased RV function. He had a RHC during his hospital stay in May as below. He has since had recurrent hospitalizations in the setting of intermittent volume overload but also a recurrent pleural effusion/empyema.  -Continue PTA carvedilol, losartan, verapamil.  -Resume Eliquis on 11/24 per surgery.  -Hold PTA Lasix.  Resumed spironolactone.     HLD  -Continue PTA statin     Anemia of chronic kidney disease  Hemoglobin at admission was 8.3.  Appears at baseline which has been around 8-10 this year.  -Continue to monitor intermittently.     History of multiple hospitalizations this year  History of medication confusion and noncompliance  Recurrent pleural effusions, empyema, hemothorax earlier this year  -Noted     DVT Prophylaxis: Pneumatic Compression Devices  Code Status: DNR / DNI  Expected discharge: Anticipate discharging home tomorrow.             Sharri Nolasco MD  Text Page (7am - 6pm, M-F)    Interval History   Stable overnight.  Doing okay today.  Has not had a BM yet after surgery.  Did eat breakfast today that went down okay.  Admits to ongoing abdominal pain manageable with current pain medications..    -Data reviewed today: I reviewed all new labs and  imaging results over the last 24 hours. I personally reviewed CT scan with result as noted above    Physical Exam   Temp: 97.9  F (36.6  C) Temp src: Oral BP: 132/76 Pulse: 73   Resp: 19 SpO2: 96 % O2 Device: None (Room air)    Vitals:    11/20/20 1723   Weight: 68 kg (150 lb)     Vital Signs with Ranges  Temp:  [97.3  F (36.3  C)-97.9  F (36.6  C)] 97.9  F (36.6  C)  Pulse:  [71-93] 73  Resp:  [18-19] 19  BP: (132-181)/(76-93) 132/76  SpO2:  [96 %-100 %] 96 %  I/O last 3 completed shifts:  In: -   Out: 7 [Drains:7]    Constitutional: Alert, appears comfortable, in no acute distress  Respiratory: Non labored breathing  Cardiovascular: Heart sounds RRR, no edema  GI: Abdomen is soft, nondistended, drain in place  Skin/Integumen: no rashes, no pressure sores  Neuro: alert, converses appropriately, moving all extremities, fluent speech, no facial asymmetry  Psych: mood and affect appropriate      Medications       acetaminophen  975 mg Oral Q8H     carvedilol  3.125 mg Oral BID w/meals     insulin aspart  1-4 Units Subcutaneous 4x Daily AC & HS     insulin glargine  10 Units Subcutaneous At Bedtime     losartan  50 mg Oral Daily     piperacillin-tazobactam  2.25 g Intravenous Q6H     polyethylene glycol  17 g Oral Daily     pravastatin  20 mg Oral Daily     senna-docusate  1 tablet Oral BID     sodium chloride (PF)  3 mL Intracatheter Q8H     spironolactone  25 mg Oral Daily     verapamil ER  120 mg Oral At Bedtime       Data   Recent Labs   Lab 11/23/20  0617 11/22/20  1242 11/21/20  1146 11/20/20  1758   WBC 13.3*  --  8.2 10.3   HGB 10.7*  --  9.7* 8.3*   MCV 84  --  85 85     --  344 334   INR  --   --  1.50*  --     139 139 134   POTASSIUM 4.5 4.3 4.1 4.5   CHLORIDE 111* 112* 114* 104   CO2 20 22 19* 23   BUN 18 14 23 32*   CR 1.32* 1.31* 1.44* 1.72*   ANIONGAP 7 5 6 7   LLOYD 7.0* 7.0* 6.9* 8.1*   * 171* 302* 322*   ALBUMIN  --  2.7* 2.5* 3.2*   PROTTOTAL  --  5.3* 5.2* 6.5*   BILITOTAL  --  0.5  0.4 0.4   ALKPHOS  --  66 69 95   ALT  --  13 10 12   AST  --  24 9 18   LIPASE  --   --   --  46*       Imaging  No results found for this or any previous visit (from the past 24 hour(s)).

## 2020-11-24 VITALS
SYSTOLIC BLOOD PRESSURE: 146 MMHG | RESPIRATION RATE: 16 BRPM | WEIGHT: 150 LBS | DIASTOLIC BLOOD PRESSURE: 91 MMHG | TEMPERATURE: 97.4 F | HEIGHT: 69 IN | OXYGEN SATURATION: 98 % | BODY MASS INDEX: 22.22 KG/M2 | HEART RATE: 76 BPM

## 2020-11-24 LAB
ANION GAP SERPL CALCULATED.3IONS-SCNC: 4 MMOL/L (ref 3–14)
BUN SERPL-MCNC: 12 MG/DL (ref 7–30)
CALCIUM SERPL-MCNC: 8.1 MG/DL (ref 8.5–10.1)
CHLORIDE SERPL-SCNC: 110 MMOL/L (ref 94–109)
CO2 SERPL-SCNC: 27 MMOL/L (ref 20–32)
COPATH REPORT: NORMAL
CREAT SERPL-MCNC: 1.2 MG/DL (ref 0.66–1.25)
ERYTHROCYTE [DISTWIDTH] IN BLOOD BY AUTOMATED COUNT: 17.2 % (ref 10–15)
GFR SERPL CREATININE-BSD FRML MDRD: 56 ML/MIN/{1.73_M2}
GLUCOSE BLDC GLUCOMTR-MCNC: 125 MG/DL (ref 70–99)
GLUCOSE BLDC GLUCOMTR-MCNC: 136 MG/DL (ref 70–99)
GLUCOSE BLDC GLUCOMTR-MCNC: 230 MG/DL (ref 70–99)
GLUCOSE SERPL-MCNC: 154 MG/DL (ref 70–99)
HCT VFR BLD AUTO: 37.4 % (ref 40–53)
HGB BLD-MCNC: 11.6 G/DL (ref 13.3–17.7)
MCH RBC QN AUTO: 25.9 PG (ref 26.5–33)
MCHC RBC AUTO-ENTMCNC: 31 G/DL (ref 31.5–36.5)
MCV RBC AUTO: 84 FL (ref 78–100)
PLATELET # BLD AUTO: 428 10E9/L (ref 150–450)
POTASSIUM SERPL-SCNC: 4.2 MMOL/L (ref 3.4–5.3)
RBC # BLD AUTO: 4.48 10E12/L (ref 4.4–5.9)
SODIUM SERPL-SCNC: 141 MMOL/L (ref 133–144)
WBC # BLD AUTO: 13.5 10E9/L (ref 4–11)

## 2020-11-24 PROCEDURE — 250N000011 HC RX IP 250 OP 636: Performed by: PHYSICIAN ASSISTANT

## 2020-11-24 PROCEDURE — 80048 BASIC METABOLIC PNL TOTAL CA: CPT | Performed by: HOSPITALIST

## 2020-11-24 PROCEDURE — 250N000013 HC RX MED GY IP 250 OP 250 PS 637: Performed by: HOSPITALIST

## 2020-11-24 PROCEDURE — 999N001017 HC STATISTIC GLUCOSE BY METER IP

## 2020-11-24 PROCEDURE — 99239 HOSP IP/OBS DSCHRG MGMT >30: CPT | Performed by: HOSPITALIST

## 2020-11-24 PROCEDURE — 85027 COMPLETE CBC AUTOMATED: CPT | Performed by: PHYSICIAN ASSISTANT

## 2020-11-24 PROCEDURE — 250N000013 HC RX MED GY IP 250 OP 250 PS 637: Performed by: PHYSICIAN ASSISTANT

## 2020-11-24 PROCEDURE — 36415 COLL VENOUS BLD VENIPUNCTURE: CPT | Performed by: HOSPITALIST

## 2020-11-24 PROCEDURE — 36415 COLL VENOUS BLD VENIPUNCTURE: CPT | Performed by: PHYSICIAN ASSISTANT

## 2020-11-24 RX ORDER — CIPROFLOXACIN 500 MG/1
500 TABLET, FILM COATED ORAL 2 TIMES DAILY
Qty: 14 TABLET | Refills: 0 | Status: SHIPPED | OUTPATIENT
Start: 2020-11-24 | End: 2020-12-01

## 2020-11-24 RX ORDER — PIPERACILLIN SODIUM, TAZOBACTAM SODIUM 3; .375 G/15ML; G/15ML
3.38 INJECTION, POWDER, LYOPHILIZED, FOR SOLUTION INTRAVENOUS EVERY 6 HOURS
Status: DISCONTINUED | OUTPATIENT
Start: 2020-11-24 | End: 2020-11-24 | Stop reason: HOSPADM

## 2020-11-24 RX ORDER — OXYCODONE HYDROCHLORIDE 5 MG/1
5 TABLET ORAL EVERY 4 HOURS PRN
Qty: 6 TABLET | Refills: 0 | Status: SHIPPED | OUTPATIENT
Start: 2020-11-24 | End: 2021-03-17

## 2020-11-24 RX ADMIN — POLYETHYLENE GLYCOL 3350 17 G: 17 POWDER, FOR SOLUTION ORAL at 08:00

## 2020-11-24 RX ADMIN — PRAVASTATIN SODIUM 20 MG: 20 TABLET ORAL at 08:00

## 2020-11-24 RX ADMIN — ACETAMINOPHEN 975 MG: 325 TABLET, FILM COATED ORAL at 02:47

## 2020-11-24 RX ADMIN — LOSARTAN POTASSIUM 50 MG: 50 TABLET, FILM COATED ORAL at 08:00

## 2020-11-24 RX ADMIN — OXYCODONE HYDROCHLORIDE 5 MG: 5 TABLET ORAL at 00:13

## 2020-11-24 RX ADMIN — PIPERACILLIN SODIUM AND TAZOBACTAM SODIUM 2.25 G: 2; .25 INJECTION, POWDER, LYOPHILIZED, FOR SOLUTION INTRAVENOUS at 00:14

## 2020-11-24 RX ADMIN — ACETAMINOPHEN 975 MG: 325 TABLET, FILM COATED ORAL at 10:58

## 2020-11-24 RX ADMIN — CARVEDILOL 3.12 MG: 3.12 TABLET, FILM COATED ORAL at 08:00

## 2020-11-24 RX ADMIN — SPIRONOLACTONE 25 MG: 25 TABLET, FILM COATED ORAL at 08:00

## 2020-11-24 RX ADMIN — DOCUSATE SODIUM 50 MG AND SENNOSIDES 8.6 MG 1 TABLET: 8.6; 5 TABLET, FILM COATED ORAL at 08:00

## 2020-11-24 RX ADMIN — PIPERACILLIN SODIUM AND TAZOBACTAM SODIUM 2.25 G: 2; .25 INJECTION, POWDER, LYOPHILIZED, FOR SOLUTION INTRAVENOUS at 05:59

## 2020-11-24 RX ADMIN — PIPERACILLIN SODIUM AND TAZOBACTAM SODIUM 3.38 G: 3; .375 INJECTION, POWDER, LYOPHILIZED, FOR SOLUTION INTRAVENOUS at 12:05

## 2020-11-24 RX ADMIN — OXYCODONE HYDROCHLORIDE 5 MG: 5 TABLET ORAL at 06:40

## 2020-11-24 ASSESSMENT — ACTIVITIES OF DAILY LIVING (ADL)
ADLS_ACUITY_SCORE: 11

## 2020-11-24 NOTE — PLAN OF CARE
DATE & TIME: 11/23/2020 3p to 11p   Cognitive Concerns/ Orientation : A&Ox4.   BEHAVIOR & AGGRESSION TOOL COLOR: Green, calm and cooperative   ABNL VS/O2: VSS on RA  MOBILITY: SBA, gait belt  PAIN MANAGMENT: Oxycodone and scheduled Tylenol, Pain level 6/10  DIET: Mod carb diet   BOWEL/BLADDER: Up to recliner  ABNL LAB/BG: Cr 1.32 (1.31 yesterday) , 369, WBC 13.3, Hgb 10.7,   DRAIN/DEVICES: R PIV saline locked, LEO drain from Lap/Chloe.   SKIN: Lap chloe sites x3 with band aids, CDI. Moderate blood tinged drainage from LEO drain site, changed dressing x 1  TESTS/PROCEDURES: NA   D/C DAY/GOALS/PLACE: TBD  OTHER IMPORTANT INFO: POD #1. Positive blood cultures, On IV Zosyn q6h. PTA Eliquis on hold,PT/OT following

## 2020-11-24 NOTE — PLAN OF CARE
A&Ox4. VSS on RA ex hypertensive. C/o incisional pain with movement, controlled with tylenol. R OSMAR CAUSEY. LEO removed by surgery today. Plan to discharge home this afternoon.

## 2020-11-24 NOTE — PLAN OF CARE
Patient discharged at 2:28 PM to home. IV was discontinued. Declined pain at time of discharge. Belongings returned to patient.  Discharge instructions and medications reviewed with patient.  Patient verbalized understanding and all questions were answered.  Prescriptions given to patient.  At time of discharge, patient condition was stable and left the unit via wheelchair escorted by LINDSEY.

## 2020-11-24 NOTE — PLAN OF CARE
DATE & TIME: 11/24/20 2300-0730    Cognitive Concerns/ Orientation : A&Ox4.   BEHAVIOR & AGGRESSION TOOL COLOR: Green, calm and cooperative   ABNL VS/O2: VSS on RA  MOBILITY: SBA, gait belt  PAIN MANAGMENT: Oxycodone and scheduled Tylenol, Pain level 6/10 to incision site RUQ.  DIET: Mod carb diet   BOWEL/BLADDER: Continent, up to commode.  ABNL LAB/BG:   DRAIN/DEVICES: R PIV saline locked, LEO drain from Lap/Chloe.   SKIN: Lap chloe sites x3 with band aids, CDI. Moderate blood tinged drainage from LEO drain site, changed dressing x 3. Surgery aware LEO is leaking, no intervention necessary, continue to monitor.  TESTS/PROCEDURES: Lap chloe 11/22   D/C DAY/GOALS/PLACE: Discharge anticipated for today, PT recommending home with assist.  OTHER IMPORTANT INFO: POD #2. Positive blood cultures, on IV Zosyn q6h. Patient on insulin pump, currently discontinued. /PT/Cardiovascular disease/Surgery

## 2020-11-24 NOTE — PROGRESS NOTES
"[facility]  GENERAL SURGERY Progress Note    Admission Date: 2020         Assessment and Plan:     Tc Garcia is an 80 year old male S/P Procedure(s): challenging lap jose, POD 2     - ADAT  - Pain controlled with Tylenol ATC, oxy prn  - WBC 13.3-->13.5, bile cx +E coli and Serratia, on day 4 of Zosyn, transition to Augmentin at discharge  - Jovani removed  - Ok to resume Eliquis today from surgery standpoint  - Med mngt per hospitalist, appreciate your help  - Home when medically able  - Rx and discharge instructions on chart                Interval History:   Pain controlled, tolerating diet, +Flatus/BM, UO adequate, ambulating.  Meds reviewed.                      Physical Exam:   Blood pressure (!) 146/91, pulse 76, temperature 97.4  F (36.3  C), temperature source Oral, resp. rate 16, height 1.753 m (5' 9\"), weight 68 kg (150 lb), SpO2 98 %.  Temperature Temp  Av.6  F (36.4  C)  Min: 97.3  F (36.3  C)  Max: 98  F (36.7  C)   I/O last 3 completed shifts:  In: -   Out: 3 [Drains:3]  Constitutional:  Awake, alert, oriented, and in no apparent distress.   Abdomen: Soft, non-distended, appropriately tender at incision(s).   Wound(s): Clean, dry, and intact. No erythema or drainage.    Extremities: No edema or calf tenderness. +SCDs          Data:     Recent Labs   Lab Test 20  1052 20  0617 20  1146   WBC 13.5* 13.3* 8.2   HGB 11.6* 10.7* 9.7*   HCT 37.4* 34.0* 31.1*    394 344      Recent Labs   Lab Test 20  1052 20  0617 20  1242    138 139   POTASSIUM 4.2 4.5 4.3   CHLORIDE 110* 111* 112*   CO2 27 20 22   BUN 12 18 14   CR 1.20 1.32* 1.31*       Bobbi Chandler PA-C  [facility]  [phone]  "

## 2020-11-24 NOTE — DISCHARGE SUMMARY
Discharge Summary  Hospitalist    Date of Admission:  11/20/2020  Date of Discharge:  11/24/2020  Discharging Provider: Sharri Nolasco MD  Date of Service (when I saw the patient): 11/24/20    Discharge Diagnoses   E. coli sepsis due to acute gangrenous cholecystitis, s/p laparoscopic cholecystectomy on 11/22/2020  S/p laparoscopic bilateral inguinal hernia repair on 10/27/2020  Lactic acidosis likely secondary to dehydration/sepsis    History of Present Illness   Please refer H & P for details.      Hospital Course        Tc Garcia is a 80 year old male with a history of type II DM, HTN, HLD, CKD 3, paroxysmal atrial fibrillation on Eliquis, HFpEF, pulmonary hypertension, reported history of medication confusion and noncompliance, recently underwent laparoscopic bilateral inguinal hernia repair presented to ED with 2 days of abdominal pain and outpatient CT scan concerning for acute cholecystitis.     E. coli sepsis due to acute gangrenous cholecystitis, s/p laparoscopic cholecystectomy on 11/22/2020  S/p laparoscopic bilateral inguinal hernia repair on 10/27/2020  Lactic acidosis likely secondary to dehydration/sepsis  Acute onset abdominal pain 2 days prior to presentation.  Seen by PCP and underwent CT abdomen pelvis that raised concern for acute cholecystitis.  Sent to ED.  Abdominal pain subsided in the ED however was nauseous and vomiting.  Right upper quadrant ultrasound showed findings indeterminate for acute cholecystitis.  Patient noted to be tachycardic with elevated lactate and growing E. coli on blood cultures.  Patient appeared sick with active emesis at presentation. Given fluids, Zosyn and Zofran and admitted for further management.  Normal WBC, lactate elevated at 2.2, normal lipase, normal bilirubin and LFTs.  Lactate normalized with IVF 1.0.  -Admit as inpatient  -Consult to general surgery.  Underwent laparoscopic cholecystectomy on 11/22/2020.  -Diet has been advanced post surgery and  patient is tolerating well.  Has not had a BM yet post surgery.  -As needed Tylenol, oxycodone, Dilaudid, antiemetics available  -Continue IV Zosyn per surgery recommendation.  E. coli on blood cultures, biliary fluid growing E. coli as well as Serratia.    Discharged home on 1 week of twice daily oral ciprofloxacin.  -Hold PTA Lasix and spironolactone given lactic acidosis and dehydration.  Resumed spironolactone 11/22.  Resumed Lasix at discharge.     Type II DM, last A1c 7.4, uncontrolled with hyperglycemia  Appears that patient is on insulin pump at this time.  Discontinued insulin pump.  Patient has brittle diabetes.  Easily prone to hypoglycemia.  Monitor closely.  -Currently on 15 units Lantus with low dose sliding scale insulin.  Titrate as needed.  Resumed insulin pump at discharge.     CKD 3  Baseline creatinine this year has ranged from 1.4-1.7.  Creatinine at admission is 1.72.  Appears to be at higher end of baseline.  -Holding diuretics and hydrated with IV NS.  Creatinine improved to 1.3.  Resumed spironolactone 11/22.  -Monitor BMP.  -Diuretics resumed at discharge.    HTN  Paroxysmal atrial fibrillation  HFpEF  His most recent echo from May 2020 shows preserved EF 55-60%, with normal wall motion. RV was mild to moderately dilated with mildly decreased RV function. He had a RHC during his hospital stay in May as below. He has since had recurrent hospitalizations in the setting of intermittent volume overload but also a recurrent pleural effusion/empyema.  -Continue PTA carvedilol, losartan, verapamil.  -Resume Eliquis on 11/24 per surgery.  -Hold PTA Lasix.  Resumed spironolactone.  -Diuretics resumed at discharge.    HLD  -Continue PTA statin     Anemia of chronic kidney disease  Hemoglobin at admission was 8.3.  Appears at baseline which has been around 8-10 this year.  -Continue to monitor intermittently.     History of multiple hospitalizations this year  History of medication confusion and  noncompliance  Recurrent pleural effusions, empyema, hemothorax earlier this year  -Noted      Patient was stable at discharge home.  Follow-up with PCP and surgery.    Sharri Nolasco MD, MD      Pending Results   These results will be followed up by Hospitalist team.  Unresulted Labs Ordered in the Past 30 Days of this Admission     Date and Time Order Name Status Description    11/22/2020 0956 Fluid Culture Aerobic Bacterial Preliminary     11/22/2020 0956 Anaerobic bacterial culture Preliminary           Code Status   DNR / DNI       Primary Care Physician   Jessika Cordoba    Follow-ups Needed After Discharge   Follow-up Appointments     Follow-up and recommended labs and tests       See Surgery Discharge Instruction Sheet.         Follow-up and recommended labs and tests       Follow up with primary care provider, Jessika Cordoba, as   scheduled for you Thursday 12/3/20 at 11:15AM, for hospital follow- up.    No follow up labs or test are needed.             Physical Exam   Temp: 97.4  F (36.3  C) Temp src: Oral BP: (!) 146/91 Pulse: 76   Resp: 16 SpO2: 98 % O2 Device: None (Room air)    Vitals:    11/20/20 1723   Weight: 68 kg (150 lb)     Vital Signs with Ranges  Temp:  [97.3  F (36.3  C)-98  F (36.7  C)] 97.4  F (36.3  C)  Pulse:  [68-76] 76  Resp:  [16-18] 16  BP: (135-146)/(80-91) 146/91  SpO2:  [98 %] 98 %  I/O last 3 completed shifts:  In: -   Out: 3 [Drains:3]      Constitutional: Alert, appears comfortable, in no acute distress  Respiratory: Non labored breathing  Cardiovascular: Heart sounds RRR, no edema  GI: Abdomen is soft, nondistended  Skin/Integumen: no rashes, no pressure sores  Neuro: alert, converses appropriately, moving all extremities, fluent speech, no facial asymmetry  Psych: mood and affect appropriate           Discharge Disposition   Discharged to home  Condition at discharge: Stable    Consultations This Hospital Stay   SURGERY GENERAL IP CONSULT  CARE MANAGEMENT /  SOCIAL WORK IP CONSULT  PHYSICAL THERAPY ADULT IP CONSULT    Time Spent on this Encounter   ISharri MD, personally saw the patient today and spent greater than 30 minutes discharging this patient.    Discharge Orders      WOUND CARE SUPPLIES    Gauze and tape for drain site     Follow-up and recommended labs and tests     See Surgery Discharge Instruction Sheet.     Reason for your hospital stay    You were hospitalized with gall bladder infection and underwent laparascopic cholecystectomy.     Follow-up and recommended labs and tests     Follow up with primary care provider, Jessika Cordoba, as scheduled for you Thursday 12/3/20 at 11:15AM, for hospital follow- up.  No follow up labs or test are needed.     Activity    Your activity upon discharge: activity as tolerated     When to contact your care team    Call your primary doctor if you have any of the following: worsening pain, fever, short of breath, bleeding from anywhere     No CPR- Do NOT Intubate     Diet    Follow this diet upon discharge: Orders Placed This Encounter      Moderate Consistent CHO Diet     Discharge Medications   Current Discharge Medication List      START taking these medications    Details   ciprofloxacin (CIPRO) 500 MG tablet Take 1 tablet (500 mg) by mouth 2 times daily for 7 days  Qty: 14 tablet, Refills: 0    Associated Diagnoses: Acute cholecystitis      oxyCODONE (ROXICODONE) 5 MG tablet Take 1 tablet (5 mg) by mouth every 4 hours as needed for moderate to severe pain  Qty: 6 tablet, Refills: 0    Associated Diagnoses: Acute post-operative pain         CONTINUE these medications which have NOT CHANGED    Details   albuterol (PROAIR HFA/PROVENTIL HFA/VENTOLIN HFA) 108 (90 Base) MCG/ACT inhaler Inhale 2 puffs into the lungs every 4 hours as needed for shortness of breath / dyspnea or wheezing Inhale 2 puffs every 4 to 6 hours as needed.    Comments: Pharmacy may dispense brand covered by insurance (Proair, or  proventil or ventolin or generic albuterol inhaler)      apixaban ANTICOAGULANT (ELIQUIS) 2.5 MG tablet Take 1 tablet (2.5 mg) by mouth 2 times daily    Associated Diagnoses: Chronic atrial fibrillation (H)      carvedilol (COREG) 6.25 MG tablet Take 0.5 tablets (3.125 mg) by mouth 2 times daily (with meals)  Qty: 180 tablet, Refills: 1    Associated Diagnoses: Acute on chronic heart failure with preserved ejection fraction (HFpEF) (H)      furosemide (LASIX) 80 MG tablet Take 1 tablet (80 mg) by mouth daily  Qty: 90 tablet, Refills: 3    Associated Diagnoses: Recurrent pleural effusion on left      losartan (COZAAR) 50 MG tablet Take 1 tablet (50 mg) by mouth daily  Qty: 90 tablet, Refills: 3    Comments: This replaces previous Rx - do not fill until requested by patient  Associated Diagnoses: Acute on chronic heart failure with preserved ejection fraction (HFpEF) (H)      LOVASTATIN 20 MG PO TABS 1 TABLET DAILY AT DINNER  Qty: 90 Tab, Refills: 0    Associated Diagnoses: Mixed hyperlipidemia      spironolactone (ALDACTONE) 25 MG tablet Take 1 tablet (25 mg) by mouth daily  Qty:  , Refills: 1    Associated Diagnoses: Acute on chronic heart failure with preserved ejection fraction (HFpEF) (H)      verapamil ER (VERELAN) 120 MG 24 hr capsule Take 1 capsule (120 mg) by mouth At Bedtime  Qty: 90 capsule, Refills: 1    Associated Diagnoses: Atrial fibrillation, unspecified type (H)      glucagon 1 MG kit 1 mg as needed for low blood sugar      insulin aspart (NOVOLOG PEN) 100 UNIT/ML pen Inject 8 units subcutaneous with breakfast, 8 units subcutaneous with lunch and 4 units with supper.  Qty: 3 mL, Refills: 0    Comments: Future refills by PCP Dr. Senthil Moya with phone number [phone].  Associated Diagnoses: Diabetic ketoacidosis without coma associated with type 1 diabetes mellitus (H); Diabetic ketoacidosis without coma associated with diabetes mellitus due to underlying condition (H)      insulin glargine  (LANTUS PEN) 100 UNIT/ML pen Inject 14 Units Subcutaneous every morning    Comments: If Lantus is not covered by insurance, may substitute Basaglar at same dose and frequency.        nystatin (MYCOSTATIN) 913125 UNIT/GM external cream Apply topically 2 times daily as needed       senna-docusate (SENOKOT-S/PERICOLACE) 8.6-50 MG tablet Take 1-2 tablets by mouth 2 times daily as needed for constipation  Qty: 20 tablet, Refills: 0    Associated Diagnoses: Postoperative pain         STOP taking these medications       HYDROcodone-acetaminophen (NORCO) 5-325 MG tablet Comments:   Reason for Stopping:             Allergies   Allergies   Allergen Reactions     No Known Drug Allergies      Data   Most Recent 3 CBC's:  Recent Labs   Lab Test 11/24/20  1052 11/23/20  0617 11/21/20  1146   WBC 13.5* 13.3* 8.2   HGB 11.6* 10.7* 9.7*   MCV 84 84 85    394 344      Most Recent 3 BMP's:  Recent Labs   Lab Test 11/24/20  1052 11/23/20  0617 11/22/20  1242    138 139   POTASSIUM 4.2 4.5 4.3   CHLORIDE 110* 111* 112*   CO2 27 20 22   BUN 12 18 14   CR 1.20 1.32* 1.31*   ANIONGAP 4 7 5   LLOYD 8.1* 7.0* 7.0*   * 279* 171*     Most Recent 2 LFT's:  Recent Labs   Lab Test 11/22/20  1242 11/21/20  1146   AST 24 9   ALT 13 10   ALKPHOS 66 69   BILITOTAL 0.5 0.4     Most Recent INR's and Anticoagulation Dosing History:  Anticoagulation Dose History     Recent Dosing and Labs Latest Ref Rng & Units 11/7/2019 12/31/2019 2/26/2020 4/16/2020 5/10/2020 7/17/2020 11/21/2020    INR 0.86 - 1.14 1.44(H) 1.65(H) 1.28(H) 1.88(H) 1.49(H) 1.18(H) 1.50(H)        Most Recent 3 Troponin's:  Recent Labs   Lab Test 07/08/20  1223 05/21/20  1302 05/10/20  2207   TROPI <0.015 0.034 0.020     Most Recent Cholesterol Panel:  Recent Labs   Lab Test 05/13/20  0553   CHOL 116   LDL 43   HDL 60   TRIG 65     Most Recent 6 Bacteria Isolates From Any Culture (See EPIC Reports for Culture Details):  Recent Labs   Lab Test 11/22/20  0954 11/20/20  1753  07/11/20  1500 07/10/20  2302 07/10/20  2221 05/21/20  1916   CULT Light growth  Serratia marcescens  Susceptibility testing in progress  *  Light growth  Escherichia coli  Susceptibility testing in progress  *  Culture negative monitoring continues Cultured on the 1st day of incubation:  Escherichia coli  *  Critical Value/Significant Value, preliminary result only, called to and read back by  Emilee Benito RN at 0640 11/21/20 hg    (Note)  POSITIVE for E.COLI by Verigene multiplex nucleic acid test. Final  identification and antimicrobial susceptibility testing will be  verified by standard methods. Verigene test will not distinguish  E.coli from Shigella species including S.dysenteriae, S.flexneri,  S.boydii, and S.sonnei. Specimens containing Shigella species or  E.coli will be reported as Positive for E.coli.    Specimen tested with Verigene multiplex, gram-negative blood culture  nucleic acid test for the following targets: Acinetobacter sp.,  Citrobacter sp., Enterobacter sp., Proteus sp., E. coli, K.  pneumoniae/oxytoca, P. aeruginosa, and the following resistance  markers: CTXM, KPC, NDM, VIM, IMP and OXA.    Critical Value/Significant Value called to and read back by  Lena Andrews RN 0856 11/21/20 AM      Cultured on the 1st day of incubation:  Escherichia coli  Susceptibility testing done on previous specimen  *  Critical Value/Significant Value, preliminary result only, called to and read back by  Lena Andrews RN 0834 11/21/20 AM   Moderate growth  Staphylococcus aureus  *  Critical Value/Significant Value, preliminary result only, called to and read back by  Alexandra Washburn RN 7.12.20 1357. ALEX   No growth No growth No growth     Most Recent TSH, T4 and A1c Labs:  Recent Labs   Lab Test 11/20/20  2233 08/10/20  1027   TSH  --  4.75*   T4  --  1.11   A1C 7.7*  --        Results for orders placed or performed during the hospital encounter of 11/20/20   US Abdomen Limited (RUQ)    Narrative     US ABDOMEN LIMITED   11/20/2020 7:57 PM     HISTORY: Right upper quadrant abdominal pain. CT concerning for  cholecystitis.    COMPARISON: None available.    FINDINGS:    Gallbladder: Gallbladder sludge. No shadowing stones. A few small  echogenic nonshadowing foci abutting the gallbladder wall, could  represent tumefactive sludge versus gallbladder polyp. Gallbladder  wall thickening measuring 5 mm. No significant pericholecystic fluid.  Sonographic Cortez sign is negative.    Bile ducts: CHD is normal diameter. No intrahepatic biliary  dilatation.    Liver: Demonstrates normal parenchymal echogenicity. No focal hepatic  mass visualized.    Pancreas: Partially obscured by overlying bowel gas,  but grossly  unremarkable.     Right kidney: No radiodense kidney stones or hydronephrosis.    Aorta and IVC: Not specifically assessed.       Impression    IMPRESSION: Gallbladder sludge with mild gallbladder wall thickening,  of indeterminate etiology, could be due to underlying cholecystitis.  Small echogenic, nonshadowing foci abutting the gallbladder wall,  could represent tumefactive sludge versus gallbladder polyps.    LINK LUI MD

## 2020-11-24 NOTE — PROGRESS NOTES
Care Management Discharge Note    Discharge Date: 11/25/20       Discharge Disposition: Home    Discharge Services: None    Discharge DME: None    Discharge Transportation: car, drives self;family or friend will provide    Private pay costs discussed: Not applicable      Education Provided on the Discharge Plan:  yes  Persons Notified of Discharge Plans:Pt, Silverio  Patient/Family in Agreement with the Plan: yes    Handoff Referral Completed: Yes    Additional Information:  PCP follow up 12/3/20.  Will also fax the Discharge Summary to [facility], as they are not on Epic.        Alyssa Dooley, RN

## 2020-11-25 ENCOUNTER — CARE COORDINATION (OUTPATIENT)
Dept: CARDIOLOGY | Facility: CLINIC | Age: 80
End: 2020-11-25

## 2020-11-25 DIAGNOSIS — I50.9 CHF (CONGESTIVE HEART FAILURE) (H): Primary | ICD-10-CM

## 2020-11-25 LAB
BACTERIA SPEC CULT: ABNORMAL
BACTERIA SPEC CULT: ABNORMAL
Lab: ABNORMAL
SPECIMEN SOURCE: ABNORMAL

## 2020-11-25 NOTE — PLAN OF CARE
Physical Therapy Discharge Summary    Reason for therapy discharge:    Discharged to home.    Progress towards therapy goal(s). See goals on Care Plan in [facility] electronic health record for goal details.  Goals partially met.  Barriers to achieving goals:   stair goal not attempted, all others met.    Therapy recommendation(s):    No further therapy is recommended.

## 2020-11-25 NOTE — PROGRESS NOTES
[facility] Telemanagement  My Health Tracker HF Alert     Weight today: 152#   Weight goal: 145-150#  MyHealth Tracker CHF Flowsheet My weight today is:   11/5/2020 147   11/6/2020 149   11/7/2020 149   11/8/2020 149   11/9/2020 145   11/10/2020 146   11/11/2020 147   11/12/2020 149   11/14/2020 147   11/15/2020 147   11/16/2020 148   11/17/2020 148   11/18/2020 147   11/25/2020 152     Heart Failure sx: none    Daily diuretic plan:   Lasix 80mg daily   Spironolactone 25mg daily    Notes: First home weight today post admission from 11/20-11/24 for E.Coli Sepsis due to acute gangrenous cholecystitis, s/p lap cholecystectomy on 11/22 and lactic acidosis likely secondary to dehydration/sepsis. Reviewed notes, diuretics were held during admission and hydrated with IV NS. Creatinine 1.72, improved to 1.3. Spironolactone resumed 11/22. Other diuretics resumed at discharge.     Called pt, he reports he is doing fairly well. A little incision pain, eating fairly well. Denies SOB or swelling. He confirms he restarted his Lasix this am, as well as his Spironolactone. Pt reports he is just tired, he slept 9 hours last night. He plans to take it easy and rest. Told pt we will check for weights again next week, if any increased weight or symptoms over long weekend to call on call MD. Pt stated understanding.     FYI to FORTUNATO Ortiz.     Future Appointments   Date Time Provider Department Center   12/1/2020  2:00 PM [facility]   12/9/2020 10:45 AM Cony Julien DO [facility]   12/28/2020 10:50 AM [facility]       BAYRON ChavarriaN, RN, CHFN  11/25/20 at 11:43 AM

## 2020-11-25 NOTE — PROGRESS NOTES
[facility] - [facility]     Incoming fax from patients PCP clinic w/notes/labe from last visit on 11/19/20. Doc sent to scanning.  Argelia Bacon RN on 11/25/2020 at 10:32 AM

## 2020-11-29 LAB
BACTERIA SPEC CULT: NORMAL
Lab: NORMAL
SPECIMEN SOURCE: NORMAL

## 2020-11-30 NOTE — PROGRESS NOTES
Thank you. Let's make sure we get labs on him prior to upcoming visit with Dr. Julien (BMP, proBNP, CBC).

## 2020-11-30 NOTE — PROGRESS NOTES
[facility]    Orders placed. Messaged scheduling to arrange.    BAYRON VanN, RN, PHN, HNB-BC   11/30/2020 at 12:10 PM

## 2020-12-01 ENCOUNTER — HOSPITAL ENCOUNTER (OUTPATIENT)
Dept: CARDIOLOGY | Facility: CLINIC | Age: 80
Discharge: HOME | End: 2020-12-01
Attending: FAMILY MEDICINE | Admitting: FAMILY MEDICINE
Payer: COMMERCIAL

## 2020-12-01 DIAGNOSIS — I48.91 ATRIAL FIBRILLATION, UNSPECIFIED TYPE (H): ICD-10-CM

## 2020-12-01 DIAGNOSIS — I50.33 ACUTE ON CHRONIC HEART FAILURE WITH PRESERVED EJECTION FRACTION (HFPEF) (H): ICD-10-CM

## 2020-12-01 DIAGNOSIS — I10 BENIGN ESSENTIAL HYPERTENSION: ICD-10-CM

## 2020-12-01 DIAGNOSIS — I50.9 CHF (CONGESTIVE HEART FAILURE) (H): ICD-10-CM

## 2020-12-01 LAB
ANION GAP SERPL CALCULATED.3IONS-SCNC: 7 MMOL/L (ref 3–14)
BUN SERPL-MCNC: 14 MG/DL (ref 7–30)
CALCIUM SERPL-MCNC: 8.8 MG/DL (ref 8.5–10.1)
CHLORIDE SERPL-SCNC: 104 MMOL/L (ref 94–109)
CO2 SERPL-SCNC: 28 MMOL/L (ref 20–32)
CREAT SERPL-MCNC: 1.2 MG/DL (ref 0.66–1.25)
ERYTHROCYTE [DISTWIDTH] IN BLOOD BY AUTOMATED COUNT: 17.5 % (ref 10–15)
GFR SERPL CREATININE-BSD FRML MDRD: 56 ML/MIN/{1.73_M2}
GLUCOSE SERPL-MCNC: 304 MG/DL (ref 70–99)
HCT VFR BLD AUTO: 34.2 % (ref 40–53)
HGB BLD-MCNC: 10.5 G/DL (ref 13.3–17.7)
MCH RBC QN AUTO: 26.1 PG (ref 26.5–33)
MCHC RBC AUTO-ENTMCNC: 30.7 G/DL (ref 31.5–36.5)
MCV RBC AUTO: 85 FL (ref 78–100)
NT-PROBNP SERPL-MCNC: 3052 PG/ML (ref 0–450)
PLATELET # BLD AUTO: 485 10E9/L (ref 150–450)
POTASSIUM SERPL-SCNC: 3.6 MMOL/L (ref 3.4–5.3)
RBC # BLD AUTO: 4.03 10E12/L (ref 4.4–5.9)
SODIUM SERPL-SCNC: 139 MMOL/L (ref 133–144)
WBC # BLD AUTO: 13.8 10E9/L (ref 4–11)

## 2020-12-01 PROCEDURE — 80048 BASIC METABOLIC PNL TOTAL CA: CPT | Performed by: PHYSICIAN ASSISTANT

## 2020-12-01 PROCEDURE — 93227 XTRNL ECG REC<48 HR R&I: CPT | Performed by: INTERNAL MEDICINE

## 2020-12-01 PROCEDURE — 36415 COLL VENOUS BLD VENIPUNCTURE: CPT | Performed by: PHYSICIAN ASSISTANT

## 2020-12-01 PROCEDURE — 93225 XTRNL ECG REC<48 HRS REC: CPT

## 2020-12-01 PROCEDURE — 83880 ASSAY OF NATRIURETIC PEPTIDE: CPT | Performed by: PHYSICIAN ASSISTANT

## 2020-12-01 PROCEDURE — 85027 COMPLETE CBC AUTOMATED: CPT | Performed by: PHYSICIAN ASSISTANT

## 2020-12-02 NOTE — PROGRESS NOTES
[facility]  My Health Tracker HF Alert    Situation/Background:    Weight up 4# overnight    Today's home wt: 149# Goal wt range: 145# - 150#  Recent wts:  MyHealth Tracker CHF Flowsheet My weight today is:   11/25/2020 152   11/27/2020 155   11/28/2020 150   11/29/2020 146   11/30/2020 146   12/1/2020 145   12/2/2020 149     Heart Failure sx: None reported  Current diuretic regimen:     Lasix 80 mg daily   Abraham 25 mg daily  Future Appointments   Date Time Provider Department Center   12/9/2020 10:45 AM Cony Julien, DO [facility] CL[facility]   12/28/2020 10:50 AM Ame Mejía, KAMILLA [facility]       Assessment/Recommendations:    Spoke to Tc. Confirms he is feeling well and denies shortness of breath, peripheral swelling, difficulty laying flat/sleeping. He states he self decreased his Lasix to 40 mg daily starting Saturday, 11/28/20 due to increased urination, and has remained on 40 mg daily since then. He is happy on this dose and would like to stay on it.     I advised him that the reason for this increase in urination is likely because diuretics were stopped at the hospital while he was receiving IVF. Since he is feeling comfortable, I told him we could continue to monitor for now but that I would review with Ame and call him back if she recommends changes.     After ending call, noticed that Tc's BMP, BNP, CBC from 12/1 have resulted, and very stable from previous aside from K of 3.6 from 4.2. Can be reviewed in detail at upcoming visit with Dr. Julien next week.     Eneida Domingo, BSN, RN, PHN, HNB-BC   12/2/2020 at 11:13 AM

## 2020-12-09 ENCOUNTER — CARE COORDINATION (OUTPATIENT)
Dept: CARDIOLOGY | Facility: CLINIC | Age: 80
End: 2020-12-09

## 2020-12-09 ENCOUNTER — OFFICE VISIT (OUTPATIENT)
Dept: CARDIOLOGY | Facility: CLINIC | Age: 80
End: 2020-12-09
Attending: PHYSICIAN ASSISTANT
Payer: COMMERCIAL

## 2020-12-09 VITALS
WEIGHT: 154.5 LBS | OXYGEN SATURATION: 99 % | BODY MASS INDEX: 22.88 KG/M2 | DIASTOLIC BLOOD PRESSURE: 62 MMHG | SYSTOLIC BLOOD PRESSURE: 142 MMHG | HEIGHT: 69 IN | HEART RATE: 53 BPM

## 2020-12-09 DIAGNOSIS — I10 ESSENTIAL HYPERTENSION: ICD-10-CM

## 2020-12-09 DIAGNOSIS — N18.30 CRF (CHRONIC RENAL FAILURE), STAGE 3 (MODERATE) (H): ICD-10-CM

## 2020-12-09 DIAGNOSIS — I50.33 ACUTE ON CHRONIC HEART FAILURE WITH PRESERVED EJECTION FRACTION (HFPEF) (H): ICD-10-CM

## 2020-12-09 DIAGNOSIS — I48.20 CHRONIC ATRIAL FIBRILLATION (H): ICD-10-CM

## 2020-12-09 DIAGNOSIS — I27.20 PULMONARY HYPERTENSION (H): ICD-10-CM

## 2020-12-09 DIAGNOSIS — I50.20 SYSTOLIC CONGESTIVE HEART FAILURE, UNSPECIFIED HF CHRONICITY (H): Primary | ICD-10-CM

## 2020-12-09 PROBLEM — F10.20 ALCOHOL DEPENDENCE (H): Status: ACTIVE | Noted: 2020-12-09

## 2020-12-09 PROCEDURE — 99214 OFFICE O/P EST MOD 30 MIN: CPT | Performed by: INTERNAL MEDICINE

## 2020-12-09 RX ORDER — LOSARTAN POTASSIUM 50 MG/1
50 TABLET ORAL 2 TIMES DAILY
Qty: 180 TABLET | Refills: 3 | Status: ON HOLD | OUTPATIENT
Start: 2020-12-09 | End: 2021-03-19

## 2020-12-09 ASSESSMENT — MIFFLIN-ST. JEOR: SCORE: 1396.19

## 2020-12-09 NOTE — PROGRESS NOTES
Service Date: 12/09/2020      HISTORY OF PRESENT ILLNESS:  Mr. Garcia is a very pleasant 80-year-old gentleman with a history of heart failure with preserved ejection fraction, pulmonary hypertension, atrial fibrillation and diabetes.  He is here for a followup visit today.        He has had frequent admissions over the past year that are well outlined by Ame Mejía in her last note in November.  He is followed in our [facility] due to recurrent left pleural effusion and decompensated diastolic heart failure.  He also has been seen in our Pul[facility] and diagnosed with pulmonary hypertension out of proportion to left-sided disease.  He was initially placed on sildenafil, but did not tolerate this due to hypotension.        He has had chronic recurrent left pleural effusions with multiple thoracenteses.  His last chest x-ray for monitoring for recurrence was on 11/20, which showed minimal left-sided pleural effusion.  He has been diagnosed with cholecystitis in late November and had his gallbladder removed.  He has had significant improvement in his abdominal discomfort that he had been having since removal.        He suffered several fractures back in 10/2019 and he has been having issues with chronic back pain ever since.  This has been improving.        He admits to shortness of breath with exertion, but denies any symptoms of orthopnea or PND or peripheral edema at this time.  His gait has been fairly stable.  He walks without assistance.  Occasionally, will hold onto a wall for balance, but has not had any recent falling.        He is on Eliquis for CVA prophylaxis related to chronic atrial fibrillation and is on a reduced dose due to age and chronic kidney disease.  He is not having any bleeding complications with that.        He had had adjustment in his verapamil dose for heart rate control.  He had some issues with severe bradycardia.  I believe it was related to conversion from AFib  to sinus bradycardia and had been seen by EP at one point, but with reduced dose of verapamil, he seems to be doing better.  He is now in persistent atrial fibrillation.  He was asked wear a Holter monitor, which he wore on 12/01 and I reviewed these results with him.  In fact, his average heart rate is doing well around 74, minimal heart rate was 47 and a maximal of 143.  He is not reporting any symptoms of lightheadedness or dizziness and again his gait appears to be more stable recently without any recurrent falling issues.        He has been having labile blood pressure issues.  We kind of went over his blood pressure medications today.  Again, he is not confident in what he is taking at home and I have written down and asked him to bring in all of his pills with him with his next visit, which he is scheduled to Ame in [facility] here in the next few weeks.      PHYSICAL EXAMINATION:   VITAL SIGNS:  Initial blood pressure was in the 150s.  I did recheck this; it was 142/62 after resting, a pulse rate of 53.  His body mass index is 22 with a weight of 154.  His weight is stable.   CARDIOVASCULAR:  Tones are irregular, consistent with persistent atrial fibrillation.  I do not appreciate a murmur or rub.   LUNGS:  Diminished posteriorly, but I did not appreciate any rales or wheezing.   EXTREMITIES:  He has no peripheral edema.      SUMMARY:  Mr. Garcia is a very pleasant 80-year-old gentleman with a history of persistent atrial fibrillation, recent Holter monitor, suggesting good heart rate control without any significant bradycardia or tachycardia and without presyncope or syncopal symptoms.  He is on Eliquis at a reduced dose for CVA prophylaxis and tolerating without bleeding complications.        He has labile hypertension and continues to have issues with higher blood pressures.  He has not had any recent low blood pressure issues since being off of sildenafil.  I have asked him to bring in his pill  bottles to make sure that we are in sync with what he is taking.  For now, I am going to increase his losartan to 50 mg twice daily.  I wrote down his blood pressure medications and when he should be taking them on his after-visit summary.  I have asked him to monitor his blood pressure and record them and bring that the measurements with him along with his pill bottles with his next visit with Ame in a few weeks.        Lastly, overall he is symptomatically improving with his back pain, but occasionally will have bad days, especially if he is out and about at clinic visits, etc.  He has been taking Tylenol, which is fine.  At times he will take the maximal dose of 3000 mg.  If he requires this on multiple days, this suggests his back pain is not well controlled.  He also admits that Tylenol does not always work well for him.  I have asked him to contact his doctor treating his back pain for further management since he is continuing to have difficulty.  He cannot take nonsteroidal anti-inflammatories due to his heart disease and requirements of anticoagulation.  He also is on medications that can interact with high doses of Tylenol.  He may require something such as tramadol or a pain medication for his bad days and so he will contact us back pain specialist for this.        We will continue to follow him closely, given his frequent recurrent heart failure admissions over the last 2 years, in [facility] with close followup.        Please feel free to contact me with any questions you have in regards to his care.      cc:      Jessika Cordoba MD   [facility]   [id number]   [place] [address]         DALE CHAVEZ DO             D: 2020   T: 2020   MT: NAILA      Name:     CEDRICK PHILLIPS   MRN:      [id number]        Account:      [id number]   :      1940           Service Date: 2020      Document: [id number]

## 2020-12-09 NOTE — PROGRESS NOTES
HPI and Plan:   See dictation    No orders of the defined types were placed in this encounter.      Orders Placed This Encounter   Medications     insulin aspart (NovoLOG) inject - to fill ambulatory pump     Sig: by Device route See Admin Instructions     losartan (COZAAR) 50 MG tablet     Sig: Take 1 tablet (50 mg) by mouth 2 times daily     Dispense:  180 tablet     Refill:  3       Medications Discontinued During This Encounter   Medication Reason     losartan (COZAAR) 50 MG tablet Reorder         Encounter Diagnoses   Name Primary?     Acute on chronic heart failure with preserved ejection fraction (HFpEF) (H)      Essential hypertension      Systolic congestive heart failure, unspecified HF chronicity (H) Yes     Chronic atrial fibrillation (H)      Pulmonary hypertension (H)      CRF (chronic renal failure), stage 3         CURRENT MEDICATIONS:  Current Outpatient Medications   Medication Sig Dispense Refill     albuterol (PROAIR HFA/PROVENTIL HFA/VENTOLIN HFA) 108 (90 Base) MCG/ACT inhaler Inhale 2 puffs into the lungs every 4 hours as needed for shortness of breath / dyspnea or wheezing Inhale 2 puffs every 4 to 6 hours as needed.       apixaban ANTICOAGULANT (ELIQUIS) 2.5 MG tablet Take 1 tablet (2.5 mg) by mouth 2 times daily       carvedilol (COREG) 6.25 MG tablet Take 0.5 tablets (3.125 mg) by mouth 2 times daily (with meals) 180 tablet 1     furosemide (LASIX) 80 MG tablet Take 1 tablet (80 mg) by mouth daily 90 tablet 3     glucagon 1 MG kit 1 mg as needed for low blood sugar       insulin aspart (NovoLOG) inject - to fill ambulatory pump by Device route See Admin Instructions       losartan (COZAAR) 50 MG tablet Take 1 tablet (50 mg) by mouth 2 times daily 180 tablet 3     LOVASTATIN 20 MG PO TABS 1 TABLET DAILY AT DINNER 90 Tab 0     nystatin (MYCOSTATIN) 457732 UNIT/GM external cream Apply topically 2 times daily as needed        oxyCODONE (ROXICODONE) 5 MG tablet Take 1 tablet (5 mg) by mouth  every 4 hours as needed for moderate to severe pain 6 tablet 0     senna-docusate (SENOKOT-S/PERICOLACE) 8.6-50 MG tablet Take 1-2 tablets by mouth 2 times daily as needed for constipation 20 tablet 0     spironolactone (ALDACTONE) 25 MG tablet Take 1 tablet (25 mg) by mouth daily  1     verapamil ER (VERELAN) 120 MG 24 hr capsule Take 1 capsule (120 mg) by mouth At Bedtime 90 capsule 1     insulin aspart (NOVOLOG PEN) 100 UNIT/ML pen Inject 8 units subcutaneous with breakfast, 8 units subcutaneous with lunch and 4 units with supper. (Patient not taking: Reported on 12/9/2020) 3 mL 0     insulin glargine (LANTUS PEN) 100 UNIT/ML pen Inject 14 Units Subcutaneous every morning         ALLERGIES     Allergies   Allergen Reactions     No Known Drug Allergies        PAST MEDICAL HISTORY:  Past Medical History:   Diagnosis Date     Anemia      Anemia of chronic disease 5/14/2020     Back pain      CKD (chronic kidney disease) stage 3, GFR 30-59 ml/min      CRF (chronic renal failure), stage 3  5/14/2020     Fall 11/2019    suffered multiple left rib fractures, C3 and T2 fractures     Mixed hyperlipidemia 7/7/2004     Paroxysmal atrial fibrillation (H)      Personal history of colonic polyps 1972    gets colonoscopy every five years, due in 2006     Pleural effusion on left 11/2019    after multiple rib fractures     Pulmonary hypertension (H) 5/10/2020    Added automatically from request for surgery [id number]     Recurrent Left Pleural effusion -- S/P Pleurex Cath 5/12/20 12/30/2019     Rosacea 1989     Type II or unspecified type diabetes mellitus without mention of complication, not stated as uncontrolled 1999     Unspecified essential hypertension 1994       PAST SURGICAL HISTORY:  Past Surgical History:   Procedure Laterality Date     ANESTHESIA CARDIOVERSION N/A 2/3/2020    Procedure: ANESTHESIA, FOR CARDIOVERSION;  Surgeon: GENERIC ANESTHESIA PROVIDER;  Location:  OR     CV RIGHT HEART CATH N/A 5/13/2020     Procedure: Right Heart Cath;  Surgeon: Senthil Silva MD;  Location:  HEART CARDIAC CATH LAB     HC REMOVE TONSILS/ADENOIDS,<11 Y/O      T & A <11y.o.     IR CHEST TUBE DRAIN TUNNELED LEFT  2020     IR CHEST TUBE PLACEMENT NON-TUNNELLED LEFT  2020     IR CHEST TUBE REMOVAL NON TUNNELED LEFT  2020     IR CHEST TUBE REMOVAL TUNNELED LEFT  2020     LAPAROSCOPIC CHOLECYSTECTOMY N/A 2020    Procedure: CHOLECYSTECTOMY, LAPAROSCOPIC;  Surgeon: Annette Lambert MD;  Location:  OR     LAPAROSCOPIC HERNIORRHAPHY INGUINAL BILATERAL Bilateral 10/27/2020    Procedure: LAPAROSCOPIC BILATERAL INGUINAL HERNIA REPAIR WITH MESH;  Surgeon: Brian Garsia MD;  Location:  OR       FAMILY HISTORY:  Family History   Problem Relation Age of Onset     Family History Negative Mother          age 71     Family History Negative Father          age 70     Diabetes Brother         alive age 77     Diabetes Brother         alive age 76     Family History Negative Brother              Family History Negative Brother              Diabetes Sister         alive age74     Family History Negative Sister          age 86     Heart Disease Sister              Family History Negative Sister              Family History Negative Sister              Diabetes Sister      Diabetes Sister      Diabetes Brother      Diabetes Brother        SOCIAL HISTORY:  Social History     Socioeconomic History     Marital status:      Spouse name: Lianna     Number of children: 4     Years of education: 16     Highest education level: None   Occupational History     Occupation: COURRIER     Employer: [employer]    Social Needs     Financial resource strain: None     Food insecurity     Worry: None     Inability: None     Transportation needs     Medical: None     Non-medical: None   Tobacco Use     Smoking status: Former Smoker     Packs/day:  "0.20     Years: 40.00     Pack years: 8.00     Types: Cigarettes     Start date:      Quit date: 2008     Years since quittin.9     Smokeless tobacco: Never Used     Tobacco comment: occasional   Substance and Sexual Activity     Alcohol use: Not Currently     Alcohol/week: 35.0 standard drinks     Frequency: Never     Drug use: Not Currently     Sexual activity: None   Lifestyle     Physical activity     Days per week: None     Minutes per session: None     Stress: None   Relationships     Social connections     Talks on phone: None     Gets together: None     Attends Confucianist service: None     Active member of club or organization: None     Attends meetings of clubs or organizations: None     Relationship status: None     Intimate partner violence     Fear of current or ex partner: None     Emotionally abused: None     Physically abused: None     Forced sexual activity: None   Other Topics Concern     Parent/sibling w/ CABG, MI or angioplasty before 65F 55M? Not Asked   Social History Narrative     None       Review of Systems:  Skin:  Positive for bruising     Eyes:  Positive for glasses    ENT:  Negative      Respiratory:  Positive for dyspnea on exertion     Cardiovascular:  Negative;palpitations;chest pain;lightheadedness;dizziness;syncope or near-syncope;cyanosis;fatigue Positive for;edema    Gastroenterology: Positive for excessive gas or bloating    Genitourinary:  Positive for urinary frequency    Musculoskeletal:  Negative      Neurologic:  Negative      Psychiatric:  Negative      Heme/Lymph/Imm:  Positive for easy bruising    Endocrine:  Positive for diabetes      Physical Exam:  Vitals: BP (!) 142/62 (BP Location: Right arm, Cuff Size: Adult Regular)   Pulse 53   Ht 1.753 m (5' 9\")   Wt 70.1 kg (154 lb 8 oz)   SpO2 99%   BMI 22.82 kg/m      Constitutional:  cooperative;well developed;cooperative, alert and oriented, well developed, well nourished, in no acute distress        Skin:  " warm and dry to the touch, no apparent skin lesions or masses noted          Head:  normocephalic        Eyes:  pupils equal and round        Lymph:      ENT:  no pallor or cyanosis        Neck:  no carotid bruit        Respiratory:  clear to auscultation;normal symmetry diminished breath sounds bilaterally       Cardiac:   irregularly irregular rhythm                pulses below the femoral arteries are diminished                                      GI:  abdomen soft obese      Extremities and Muscular Skeletal:  no edema;no deformities, clubbing, cyanosis, erythema observed              Neurological:  no gross motor deficits        Psych:  Alert and Oriented x 3          CC  Ame Mejía PA-C  [address]  [place] [address]

## 2020-12-09 NOTE — LETTER
12/9/2020    Jessika Ratliff Adalid  [address]  [place] [address]    RE: Tc Garcia       Dear Colleague,    I had the pleasure of seeing Tc Garcia in the [facility].    HPI and Plan:   See dictation    No orders of the defined types were placed in this encounter.      Orders Placed This Encounter   Medications     insulin aspart (NovoLOG) inject - to fill ambulatory pump     Sig: by Device route See Admin Instructions     losartan (COZAAR) 50 MG tablet     Sig: Take 1 tablet (50 mg) by mouth 2 times daily     Dispense:  180 tablet     Refill:  3       Medications Discontinued During This Encounter   Medication Reason     losartan (COZAAR) 50 MG tablet Reorder         Encounter Diagnoses   Name Primary?     Acute on chronic heart failure with preserved ejection fraction (HFpEF) (H)      Essential hypertension      Systolic congestive heart failure, unspecified HF chronicity (H) Yes     Chronic atrial fibrillation (H)      Pulmonary hypertension (H)      CRF (chronic renal failure), stage 3         CURRENT MEDICATIONS:  Current Outpatient Medications   Medication Sig Dispense Refill     albuterol (PROAIR HFA/PROVENTIL HFA/VENTOLIN HFA) 108 (90 Base) MCG/ACT inhaler Inhale 2 puffs into the lungs every 4 hours as needed for shortness of breath / dyspnea or wheezing Inhale 2 puffs every 4 to 6 hours as needed.       apixaban ANTICOAGULANT (ELIQUIS) 2.5 MG tablet Take 1 tablet (2.5 mg) by mouth 2 times daily       carvedilol (COREG) 6.25 MG tablet Take 0.5 tablets (3.125 mg) by mouth 2 times daily (with meals) 180 tablet 1     furosemide (LASIX) 80 MG tablet Take 1 tablet (80 mg) by mouth daily 90 tablet 3     glucagon 1 MG kit 1 mg as needed for low blood sugar       insulin aspart (NovoLOG) inject - to fill ambulatory pump by Device route See Admin Instructions       losartan (COZAAR) 50 MG tablet Take 1 tablet (50 mg) by mouth 2 times daily 180 tablet 3     LOVASTATIN  20 MG PO TABS 1 TABLET DAILY AT DINNER 90 Tab 0     nystatin (MYCOSTATIN) 339683 UNIT/GM external cream Apply topically 2 times daily as needed        oxyCODONE (ROXICODONE) 5 MG tablet Take 1 tablet (5 mg) by mouth every 4 hours as needed for moderate to severe pain 6 tablet 0     senna-docusate (SENOKOT-S/PERICOLACE) 8.6-50 MG tablet Take 1-2 tablets by mouth 2 times daily as needed for constipation 20 tablet 0     spironolactone (ALDACTONE) 25 MG tablet Take 1 tablet (25 mg) by mouth daily  1     verapamil ER (VERELAN) 120 MG 24 hr capsule Take 1 capsule (120 mg) by mouth At Bedtime 90 capsule 1     insulin aspart (NOVOLOG PEN) 100 UNIT/ML pen Inject 8 units subcutaneous with breakfast, 8 units subcutaneous with lunch and 4 units with supper. (Patient not taking: Reported on 12/9/2020) 3 mL 0     insulin glargine (LANTUS PEN) 100 UNIT/ML pen Inject 14 Units Subcutaneous every morning         ALLERGIES     Allergies   Allergen Reactions     No Known Drug Allergies        PAST MEDICAL HISTORY:  Past Medical History:   Diagnosis Date     Anemia      Anemia of chronic disease 5/14/2020     Back pain      CKD (chronic kidney disease) stage 3, GFR 30-59 ml/min      CRF (chronic renal failure), stage 3  5/14/2020     Fall 11/2019    suffered multiple left rib fractures, C3 and T2 fractures     Mixed hyperlipidemia 7/7/2004     Paroxysmal atrial fibrillation (H)      Personal history of colonic polyps 1972    gets colonoscopy every five years, due in 2006     Pleural effusion on left 11/2019    after multiple rib fractures     Pulmonary hypertension (H) 5/10/2020    Added automatically from request for surgery [id number]     Recurrent Left Pleural effusion -- S/P Pleurex Cath 5/12/20 12/30/2019     Rosacea 1989     Type II or unspecified type diabetes mellitus without mention of complication, not stated as uncontrolled 1999     Unspecified essential hypertension 1994       PAST SURGICAL HISTORY:  Past Surgical History:    Procedure Laterality Date     ANESTHESIA CARDIOVERSION N/A 2/3/2020    Procedure: ANESTHESIA, FOR CARDIOVERSION;  Surgeon: GENERIC ANESTHESIA PROVIDER;  Location:  OR     CV RIGHT HEART CATH N/A 2020    Procedure: Right Heart Cath;  Surgeon: Senthil Silva MD;  Location:  HEART CARDIAC CATH LAB     HC REMOVE TONSILS/ADENOIDS,<13 Y/O      T & A <11y.o.     IR CHEST TUBE DRAIN TUNNELED LEFT  2020     IR CHEST TUBE PLACEMENT NON-TUNNELLED LEFT  2020     IR CHEST TUBE REMOVAL NON TUNNELED LEFT  2020     IR CHEST TUBE REMOVAL TUNNELED LEFT  2020     LAPAROSCOPIC CHOLECYSTECTOMY N/A 2020    Procedure: CHOLECYSTECTOMY, LAPAROSCOPIC;  Surgeon: Annette Lambert MD;  Location:  OR     LAPAROSCOPIC HERNIORRHAPHY INGUINAL BILATERAL Bilateral 10/27/2020    Procedure: LAPAROSCOPIC BILATERAL INGUINAL HERNIA REPAIR WITH MESH;  Surgeon: Brian Garsia MD;  Location:  OR       FAMILY HISTORY:  Family History   Problem Relation Age of Onset     Family History Negative Mother          age 71     Family History Negative Father          age 70     Diabetes Brother         alive age 77     Diabetes Brother         alive age 76     Family History Negative Brother              Family History Negative Brother              Diabetes Sister         alive age76     Family History Negative Sister          age 86     Heart Disease Sister              Family History Negative Sister              Family History Negative Sister              Diabetes Sister      Diabetes Sister      Diabetes Brother      Diabetes Brother        SOCIAL HISTORY:  Social History     Socioeconomic History     Marital status:      Spouse name: Lianna     Number of children: 4     Years of education: 16     Highest education level: None   Occupational History     Occupation: COURRIER     Employer: [employer]    Social Needs      Financial resource strain: None     Food insecurity     Worry: None     Inability: None     Transportation needs     Medical: None     Non-medical: None   Tobacco Use     Smoking status: Former Smoker     Packs/day: 0.20     Years: 40.00     Pack years: 8.00     Types: Cigarettes     Start date:      Quit date: 2008     Years since quittin.9     Smokeless tobacco: Never Used     Tobacco comment: occasional   Substance and Sexual Activity     Alcohol use: Not Currently     Alcohol/week: 35.0 standard drinks     Frequency: Never     Drug use: Not Currently     Sexual activity: None   Lifestyle     Physical activity     Days per week: None     Minutes per session: None     Stress: None   Relationships     Social connections     Talks on phone: None     Gets together: None     Attends Jain service: None     Active member of club or organization: None     Attends meetings of clubs or organizations: None     Relationship status: None     Intimate partner violence     Fear of current or ex partner: None     Emotionally abused: None     Physically abused: None     Forced sexual activity: None   Other Topics Concern     Parent/sibling w/ CABG, MI or angioplasty before 65F 55M? Not Asked   Social History Narrative     None       Review of Systems:  Skin:  Positive for bruising     Eyes:  Positive for glasses    ENT:  Negative      Respiratory:  Positive for dyspnea on exertion     Cardiovascular:  Negative;palpitations;chest pain;lightheadedness;dizziness;syncope or near-syncope;cyanosis;fatigue Positive for;edema    Gastroenterology: Positive for excessive gas or bloating    Genitourinary:  Positive for urinary frequency    Musculoskeletal:  Negative      Neurologic:  Negative      Psychiatric:  Negative      Heme/Lymph/Imm:  Positive for easy bruising    Endocrine:  Positive for diabetes      Physical Exam:  Vitals: BP (!) 142/62 (BP Location: Right arm, Cuff Size: Adult Regular)   Pulse 53   Ht 1.753 m  "(5' 9\")   Wt 70.1 kg (154 lb 8 oz)   SpO2 99%   BMI 22.82 kg/m      Constitutional:  cooperative;well developed;cooperative, alert and oriented, well developed, well nourished, in no acute distress        Skin:  warm and dry to the touch, no apparent skin lesions or masses noted          Head:  normocephalic        Eyes:  pupils equal and round        Lymph:      ENT:  no pallor or cyanosis        Neck:  no carotid bruit        Respiratory:  clear to auscultation;normal symmetry diminished breath sounds bilaterally       Cardiac:   irregularly irregular rhythm                pulses below the femoral arteries are diminished                                      GI:  abdomen soft obese      Extremities and Muscular Skeletal:  no edema;no deformities, clubbing, cyanosis, erythema observed              Neurological:  no gross motor deficits        Psych:  Alert and Oriented x 3          CC  Ame Mejía PA-C  [address]  [place] [address]                      Thank you for allowing me to participate in the care of your patient.      Sincerely,     Cony Julien, DO     [facility]    cc:   Ame Mejía PA-C  [address]  [place] [address]        "

## 2020-12-09 NOTE — PATIENT INSTRUCTIONS
MAKE SURE TO BRING ALL THE PILLS YOU TAKE WITH YOU TO YOUR NEXT VISIT WITH EDDIE    SHOULD BE TAKING (FOR BLOOD PRESSURE)  COREG (CARVEDILOL) 3.125MG TWICE DAILY  LOSARTAN 50MG TWICE DAILY (INCREASED THIS TODAY TO TWICE DAILY)  VERAPAMIL 120MG ONCE DAILY IN AM      TAKE ALL OTHER PILLS AS PRESCRIBED.

## 2020-12-09 NOTE — PROGRESS NOTES
[facility]  MyHealth Tracker HF     Telemanagement for review at visit today with Dr. Julien. Weight goal currently 145-150#.     Recent telemanagement data:   My[employer] Tracker CHF Flowsheet My weight today is:   12/2/2020 149   12/3/2020 149   12/4/2020 149   12/5/2020 148   12/6/2020 149   12/8/2020 150   12/9/2020 150       BAYRON ChavarriaN, RN, CHFN  12/09/20 at 10:13 AM

## 2020-12-09 NOTE — LETTER
12/9/2020      Jessika Ratliff Adalid  [address]  [facility] [address]      RE: Tc Garcia       Dear Colleague,    I had the pleasure of seeing Tc Garcia in the [facility].    Service Date: 12/09/2020      HISTORY OF PRESENT ILLNESS:  Mr. Garcia is a very pleasant 80-year-old gentleman with a history of heart failure with preserved ejection fraction, pulmonary hypertension, atrial fibrillation and diabetes.  He is here for a followup visit today.        He has had frequent admissions over the past year that are well outlined by Ame Mejía in her last note in November.  He is followed in our [facility] due to recurrent left pleural effusion and decompensated diastolic heart failure.  He also has been seen in our Pulmonary Hypertension Clinic and diagnosed with pulmonary hypertension out of proportion to left-sided disease.  He was initially placed on sildenafil, but did not tolerate this due to hypotension.        He has had chronic recurrent left pleural effusions with multiple thoracenteses.  His last chest x-ray for monitoring for recurrence was on 11/20, which showed minimal left-sided pleural effusion.  He has been diagnosed with cholecystitis in late November and had his gallbladder removed.  He has had significant improvement in his abdominal discomfort that he had been having since removal.        He suffered several fractures back in 10/2019 and he has been having issues with chronic back pain ever since.  This has been improving.        He admits to shortness of breath with exertion, but denies any symptoms of orthopnea or PND or peripheral edema at this time.  His gait has been fairly stable.  He walks without assistance.  Occasionally, will hold onto a wall for balance, but has not had any recent falling.        He is on Eliquis for CVA prophylaxis related to chronic atrial fibrillation and is on a reduced dose due to age and chronic kidney disease.   He is not having any bleeding complications with that.        He had had adjustment in his verapamil dose for heart rate control.  He had some issues with severe bradycardia.  I believe it was related to conversion from AFib to sinus bradycardia and had been seen by EP at one point, but with reduced dose of verapamil, he seems to be doing better.  He is now in persistent atrial fibrillation.  He was asked wear a Holter monitor, which he wore on 12/01 and I reviewed these results with him.  In fact, his average heart rate is doing well around 74, minimal heart rate was 47 and a maximal of 143.  He is not reporting any symptoms of lightheadedness or dizziness and again his gait appears to be more stable recently without any recurrent falling issues.        He has been having labile blood pressure issues.  We kind of went over his blood pressure medications today.  Again, he is not confident in what he is taking at home and I have written down and asked him to bring in all of his pills with him with his next visit, which he is scheduled to Ame in [facility] here in the next few weeks.      PHYSICAL EXAMINATION:   VITAL SIGNS:  Initial blood pressure was in the 150s.  I did recheck this; it was 142/62 after resting, a pulse rate of 53.  His body mass index is 22 with a weight of 154.  His weight is stable.   CARDIOVASCULAR:  Tones are irregular, consistent with persistent atrial fibrillation.  I do not appreciate a murmur or rub.   LUNGS:  Diminished posteriorly, but I did not appreciate any rales or wheezing.   EXTREMITIES:  He has no peripheral edema.      SUMMARY:  Mr. Garcia is a very pleasant 80-year-old gentleman with a history of persistent atrial fibrillation, recent Holter monitor, suggesting good heart rate control without any significant bradycardia or tachycardia and without presyncope or syncopal symptoms.  He is on Eliquis at a reduced dose for CVA prophylaxis and tolerating without bleeding  complications.        He has labile hypertension and continues to have issues with higher blood pressures.  He has not had any recent low blood pressure issues since being off of sildenafil.  I have asked him to bring in his pill bottles to make sure that we are in sync with what he is taking.  For now, I am going to increase his losartan to 50 mg twice daily.  I wrote down his blood pressure medications and when he should be taking them on his after-visit summary.  I have asked him to monitor his blood pressure and record them and bring that the measurements with him along with his pill bottles with his next visit with Ame in a few weeks.        Lastly, overall he is symptomatically improving with his back pain, but occasionally will have bad days, especially if he is out and about at clinic visits, etc.  He has been taking Tylenol, which is fine.  At times he will take the maximal dose of 3000 mg.  If he requires this on multiple days, this suggests his back pain is not well controlled.  He also admits that Tylenol does not always work well for him.  I have asked him to contact his doctor treating his back pain for further management since he is continuing to have difficulty.  He cannot take nonsteroidal anti-inflammatories due to his heart disease and requirements of anticoagulation.  He also is on medications that can interact with high doses of Tylenol.  He may require something such as tramadol or a pain medication for his bad days and so he will contact us back pain specialist for this.        We will continue to follow him closely, given his frequent recurrent heart failure admissions over the last 2 years, in [facility] with close followup.        Please feel free to contact me with any questions you have in regards to his care.      cc:      Jessika Cordoba MD   [facility]   [address]   [place] [address]         DALE CHAVEZ,          D: 12/09/2020    T: 2020   MT: NAILA      Name:     CEDRICK PHILLIPS   MRN:      [id number]        Account:      [id number]   :      1940           Service Date: 2020      Document: [id number]        Outpatient Encounter Medications as of 2020   Medication Sig Dispense Refill     albuterol (PROAIR HFA/PROVENTIL HFA/VENTOLIN HFA) 108 (90 Base) MCG/ACT inhaler Inhale 2 puffs into the lungs every 4 hours as needed for shortness of breath / dyspnea or wheezing Inhale 2 puffs every 4 to 6 hours as needed.       apixaban ANTICOAGULANT (ELIQUIS) 2.5 MG tablet Take 1 tablet (2.5 mg) by mouth 2 times daily       carvedilol (COREG) 6.25 MG tablet Take 0.5 tablets (3.125 mg) by mouth 2 times daily (with meals) 180 tablet 1     furosemide (LASIX) 80 MG tablet Take 1 tablet (80 mg) by mouth daily 90 tablet 3     glucagon 1 MG kit 1 mg as needed for low blood sugar       insulin aspart (NovoLOG) inject - to fill ambulatory pump by Device route See Admin Instructions       losartan (COZAAR) 50 MG tablet Take 1 tablet (50 mg) by mouth 2 times daily 180 tablet 3     LOVASTATIN 20 MG PO TABS 1 TABLET DAILY AT DINNER 90 Tab 0     nystatin (MYCOSTATIN) 233167 UNIT/GM external cream Apply topically 2 times daily as needed        oxyCODONE (ROXICODONE) 5 MG tablet Take 1 tablet (5 mg) by mouth every 4 hours as needed for moderate to severe pain 6 tablet 0     senna-docusate (SENOKOT-S/PERICOLACE) 8.6-50 MG tablet Take 1-2 tablets by mouth 2 times daily as needed for constipation 20 tablet 0     spironolactone (ALDACTONE) 25 MG tablet Take 1 tablet (25 mg) by mouth daily  1     verapamil ER (VERELAN) 120 MG 24 hr capsule Take 1 capsule (120 mg) by mouth At Bedtime 90 capsule 1     insulin aspart (NOVOLOG PEN) 100 UNIT/ML pen Inject 8 units subcutaneous with breakfast, 8 units subcutaneous with lunch and 4 units with supper. (Patient not taking: Reported on 2020) 3 mL 0     insulin glargine (LANTUS PEN) 100 UNIT/ML pen Inject 14  Units Subcutaneous every morning       [DISCONTINUED] losartan (COZAAR) 50 MG tablet Take 1 tablet (50 mg) by mouth daily 90 tablet 3     No facility-administered encounter medications on file as of 12/9/2020.        Again, thank you for allowing me to participate in the care of your patient.      Sincerely,    Cony Julien DO     [facility]

## 2020-12-10 ENCOUNTER — TELEPHONE (OUTPATIENT)
Dept: SURGERY | Facility: CLINIC | Age: 80
End: 2020-12-10

## 2020-12-10 NOTE — TELEPHONE ENCOUNTER
[facility]  Post op call note - No Answer    Tc Garcia was called for an update regarding his recovery.  There was no answer and a message was left instructing the patient to call the office with any questions or concerns.     Bobbi Chandler PA-C

## 2020-12-14 ENCOUNTER — TRANSFERRED RECORDS (OUTPATIENT)
Dept: HEALTH INFORMATION MANAGEMENT | Facility: CLINIC | Age: 80
End: 2020-12-14

## 2020-12-14 ENCOUNTER — CARE COORDINATION (OUTPATIENT)
Dept: CARDIOLOGY | Facility: CLINIC | Age: 80
End: 2020-12-14

## 2020-12-14 DIAGNOSIS — J90 RECURRENT PLEURAL EFFUSION ON LEFT: ICD-10-CM

## 2020-12-14 DIAGNOSIS — I50.9 CHF (CONGESTIVE HEART FAILURE) (H): Primary | ICD-10-CM

## 2020-12-14 LAB
ALBUMIN SERPL-MCNC: 4.2 G/DL
ALBUMIN SERPL-MCNC: 4.2 G/DL (ref 3.6–4.6)
ALBUMIN/GLOB SERPL: 2.6 {RATIO} (ref 1.2–2.2)
ALP SERPL-CCNC: 449 U/L
ALP SERPL-CCNC: 449 U/L (ref 39–117)
ALT SERPL-CCNC: 38 U/L
ALT SERPL-CCNC: 38 U/L (ref 0–44)
ANION GAP SERPL CALCULATED.3IONS-SCNC: 2.6 MMOL/L
AST SERPL-CCNC: 28 U/L
AST SERPL-CCNC: 28 U/L (ref 0–40)
BILIRUB SERPL-MCNC: 1 MG/DL
BILIRUB SERPL-MCNC: 1 MG/DL (ref 0–1.2)
BUN SERPL-MCNC: 16 MG/DL
BUN SERPL-MCNC: 21 MG/DL (ref 8–27)
BUN/CREATININE RATIO: 16 (ref 10–24)
CALCIUM SERPL-MCNC: 9 MG/DL
CALCIUM SERPL-MCNC: 9 MG/DL (ref 8.6–10.2)
CHLORIDE SERPLBLD-SCNC: 99 MMOL/L
CHLORIDE SERPLBLD-SCNC: 99 MMOL/L (ref 96–106)
CO2 SERPL-SCNC: 25 MMOL/L
CO2 SERPL-SCNC: 25 MMOL/L (ref 20–29)
CREAT SERPL-MCNC: 1.33 MG/DL
CREAT SERPL-MCNC: 1.33 MG/DL (ref 0.76–1.27)
GFR SERPL CREATININE-BSD FRML MDRD: 50 ML/MIN/1.73M2
GFR SERPL CREATININE-BSD FRML MDRD: 50 ML/MIN/1.73M2
GLUCOSE SERPL-MCNC: 175 MG/DL (ref 65–99)
GLUCOSE SERPL-MCNC: 175 MG/DL (ref 70–99)
POTASSIUM SERPL-SCNC: 4.1 MMOL/L
POTASSIUM SERPL-SCNC: 4.1 MMOL/L (ref 3.5–5.2)
PROT SERPL-MCNC: 5.8 G/DL
PROT SERPL-MCNC: 5.8 G/DL (ref 6–8.5)
SODIUM SERPL-SCNC: 138 MMOL/L
SODIUM SERPL-SCNC: 138 MMOL/L (ref 134–144)

## 2020-12-14 NOTE — PROGRESS NOTES
[facility]  My Health Tracker HF Alert    Situation/Background:    Wt below goal     Today's home wt: 144# Goal wt range: 145# - 150#  Recent wts:  MyHealth Tracker CHF Flowsheet My weight today is:   12/8/2020 150   12/9/2020 150   12/10/2020 150   12/11/2020 146   12/12/2020 145   12/13/2020 145   12/14/2020 144   12/14/2020 144     Heart Failure sx: SOB and dizziness reported  Current diuretic regimen:     Lasix 80 mg daily   Nim[place] 25 mg daily    Future Appointments   Date Time Provider Department Center   12/28/2020 10:50 AM Ame Mejía, KAMILLA DELA CRUZ [facility]       Assessment/Recommendations:    Called Tc to discuss. He said he was SOB this AM for a little bit after he got out of bed but it has since resolved. He was also dizzy when he got out of bed but this resolved too. BP this AM was 129/73.    I asked Tc if he knows why he is losing weight. He told me that he he had stopped his Lasix for a few days but decided to restart after seeing Dr. Julien on 12/9.     Of note, he also tells me he has had decreased appetite, shivers, and one episode of emesis this weekend. He reports he is seeing his PCP about this today.     Advised Tc to call me if excessive vomiting so that we decrease his Lasix (do not want him to get dehydrated). Tc agreed to do this. For now, since he is well from a HF symptoms standpoint and weight loss can be explained by recent Lasix restart, can continue to monitor.     PATRICIA Mccloud.     Eneida Domingo, BAYRONN, RN, PHN, HNB-BC   12/14/2020 at 2:12 PM

## 2020-12-17 PROBLEM — I50.9 CHF (CONGESTIVE HEART FAILURE) (H): Status: ACTIVE | Noted: 2020-12-17

## 2020-12-17 NOTE — PROGRESS NOTES
"[facility]  My Health Tracker HF Alert    Situation/Background:    Wt continues to drop    Yesterday's home wt: 142# Goal wt range: 145# - 150#  Recent wts:  MyHealth Tracker CHF Flowsheet My weight today is:   12/10/2020 150   12/11/2020 146   12/12/2020 145   12/13/2020 145   12/14/2020 144   12/14/2020 144   12/15/2020 144   12/16/2020 142     Heart Failure sx: None reported  Current diuretic regimen:     Lasix 80 mg daily   [place] 25 mg daily  Future Appointments   Date Time Provider Department Center   12/28/2020 10:50 AM Ame Mejía, KAMILLA DELA CRUZ [facility]     Assessment/Recommendations:    Tc's weight dropped an additional 2# yesterday (did not call until after noon). During a previous conversation, Tc reported that he stopped his Lasix and then restarted 12/10/20, following his appt with Dr. Julien on 12/9/20. He has had ongoing GI issues following his hospital admission in November for gangrenous cholecystitis/sepsis s/p lap jose 11/22/20. Per notes, he missed his surgical follow-up 12/10/20.     Called Tc  to discuss. States \"I think I waited too long to hold the Lasix\". Weight up to 144# today and he is planning to hold Lasix until weight gets back up to 150#. Advised that he should have labs done. He is willing to get this at PCP on Monday. Confirmed PCP at [facility]. Advised that we will fax BMP order and watch for results.     Also advised that I would review the above with Ame and continue to monitor on MHT. Tc verbalized understanding and agreed.    Eneida Domingo, BSN, RN, PHN, HNB-BC   12/17/2020 at 9:18 AM      "

## 2020-12-18 RX ORDER — FUROSEMIDE 80 MG
20 TABLET ORAL DAILY
Qty: 90 TABLET | Refills: 3 | COMMUNITY
Start: 2020-12-18 | End: 2020-12-28

## 2020-12-18 NOTE — PROGRESS NOTES
[facility]    Called Tc today, he agreed to decrease Lasix to 40 mg daily. He held Lasix today but will start 40 mg daily tomorrow. He is comfortable cutting pill in half.     Med list updated.     He also states PCP received our fax but they have already drawn labs. Apparently they will be faxing to us. Will await results.    BAYRON VanN, RN, PHN, HNB-BC   12/18/2020 at 11:46 AM

## 2020-12-22 ENCOUNTER — DOCUMENTATION ONLY (OUTPATIENT)
Dept: CARDIOLOGY | Facility: CLINIC | Age: 80
End: 2020-12-22

## 2020-12-22 NOTE — PROGRESS NOTES
Labs were ordered and drawn at PCP, therefore, they are aware of results.     BAYRON VanN, RN, PHN, HNB-BC   12/22/2020 at 2:34 PM

## 2020-12-22 NOTE — PROGRESS NOTES
[facility]    Lab results received late yesterday for Mr. Garcia from PCP clinic. They were drawn 12/14. See below (compare to 12/1/20):      Ref. Range 12/1/2020 14:34 12/14/2020 00:00   Sodium Latest Ref Range: 134 - 144 mmol/L 139 138   Potassium Latest Ref Range: 3.5 - 5.2 mmol/L 3.6 4.1   Chloride Latest Ref Range: 96 - 106 mmol/L 104 99   Carbon Dioxide Latest Ref Range: 20 - 29 mmol/L 28 25   Urea Nitrogen Latest Ref Range: 10 - 24 mg/dL 14 16   Creatinine Latest Ref Range: 0.76 - 1.27 mg/dL 1.20 1.33 (H)   GFR Estimate Latest Ref Range: >60 ml/min/1.73m2 56 (L) 50 (L)   GFR Estimate If Black Latest Ref Range: >60 ml/min/1.73m2 65 58 (L)   Calcium Latest Ref Range: 8.6 - 10.2 mg/dL 8.8 9.0   Anion Gap Latest Ref Range: 1.2 - 2.2 mmol/L 7 2.6 (H)   Albumin Latest Ref Range: 3.5 - 4.5 g/dL  4.2   Protein Total Latest Ref Range: 6.0 - 8.5 g/dL  5.8 (L)   Bilirubin Total Latest Ref Range: 0.0 - 1.2 mg/dL  1.0   Alkaline Phosphatase Latest Ref Range: 39 - 117 U/L  449 (H)   ALT Latest Ref Range: 0 - 44 U/L  38   AST Latest Ref Range: 0 - 40 U/L  28        Lasix recently changed from 80 mg daily to 40 mg daily, as Mr. Garcia was not taking 80 mg consistently and therefore, weights were fluctuating. Recent weights below:     MyHealth Tracker CHF Flowsheet My weight today is:   12/15/2020 144   12/16/2020 142   12/18/2020 146   12/19/2020 145   12/20/2020 145   12/21/2020 146     Weight goal: 145-150#.    Next appt with Ame is 12/28. Ame is out of the office, but since labs are from 12/14 and are relatively stable, Ame can review at visit.     Eneida Domingo, BSN, RN, PHN, HNB-BC   12/22/2020 at 9:25 AM

## 2020-12-28 ENCOUNTER — VIRTUAL VISIT (OUTPATIENT)
Dept: CARDIOLOGY | Facility: CLINIC | Age: 80
End: 2020-12-28
Attending: PHYSICIAN ASSISTANT
Payer: COMMERCIAL

## 2020-12-28 DIAGNOSIS — I50.32 CHRONIC HEART FAILURE WITH PRESERVED EJECTION FRACTION (HFPEF) (H): ICD-10-CM

## 2020-12-28 DIAGNOSIS — J90 RECURRENT PLEURAL EFFUSION ON LEFT: ICD-10-CM

## 2020-12-28 PROCEDURE — 99213 OFFICE O/P EST LOW 20 MIN: CPT | Mod: 95 | Performed by: PHYSICIAN ASSISTANT

## 2020-12-28 RX ORDER — FUROSEMIDE 40 MG
40 TABLET ORAL DAILY
Qty: 90 TABLET | Refills: 3 | Status: SHIPPED | OUTPATIENT
Start: 2020-12-28 | End: 2021-01-05

## 2020-12-28 NOTE — PROGRESS NOTES
"Tc Garcia is a 80 year old male who is being evaluated via a billable telephone visit.      The patient has been notified of following:     \"This telephone visit will be conducted via a call between you and your physician/provider. We have found that certain health care needs can be provided without the need for a physical exam.  This service lets us provide the care you need with a short phone conversation.  If a prescription is necessary we can send it directly to your pharmacy.  If lab work is needed we can place an order for that and you can then stop by our lab to have the test done at a later time.    Telephone visits are billed at different rates depending on your insurance coverage. During this emergency period, for some insurers they may be billed the same as an in-person visit.  Please reach out to your insurance provider with any questions.    If during the course of the call the physician/provider feels a telephone visit is not appropriate, you will not be charged for this service.\"    Patient has given verbal consent for Telephone visit?  Yes    What phone number would you like to be contacted at? [phone]    How would you like to obtain your AVS? Debbiehart    Phone call duration: 16 minutes    LEXI LARA for CORE follow-ups:  - Any difficulty breathing, including when trying to lie in bed at night?No  - Any leg swelling (?compression or wraps), abdominal bloating, reduction in appetite? None  - Any chest pain or palpitations? none  - Weight trend over the past week? 144-151lb  - O2 or CPAP use? none  - Do they have a pulse oximeter they can use to check O2 sat/HR? Yes, 99% O2 saturation  Today's Home Vitals:  Vital signs reported by patient  BP. 128/76  P. 69  Wt. 151lb  Annette Rosenthal Lpn  ___________________  Tc Garcia is a 80 year old male who is being evaluated via a billable video visit.  This visit is being conducted as a virtual visit due to the emphasis on mitigation of the COVID-19 virus " pandemic. The clinician has decided that the risk of an in-office visit outweighs the benefit for this patient.       I have reviewed and updated the patient's Past Medical History, Social History, Family History and Medication List.    PROBLEM LIST  Patient Active Problem List   Diagnosis     DM type 2, Hgb A1C 8.2 on 4/25/20     Mixed hyperlipidemia     Essential hypertension     Pain in limb     Plantar fascial fibromatosis     HYPERLIPIDEMIA LDL GOAL <100     Hemothorax on left     Recurrent Left Pleural effusion -- S/P Pleurex Cath 5/12/20     ABBIE (acute kidney injury) (H)     Acute respiratory failure with hypoxia (H)     Pulmonary hypertension (H)     CRF (chronic renal failure), stage 3      Anemia of chronic disease     Atrial fibrillation/flutter -- on Eliquis and Amiodarone     DKA (diabetic ketoacidoses) (H)     Anemia in CKD (chronic kidney disease)     Dyspnea     SOB (shortness of breath)     Non-recurrent bilateral inguinal hernia without obstruction or gangrene     Acute cholecystitis     Abdominal pain, generalized     Non-intractable vomiting with nausea, unspecified vomiting type     Alcohol dependence (H)     CHF (congestive heart failure) (H)       ALLERGIES  No known drug allergies    MEDICATIONS  Current Outpatient Medications   Medication Sig Dispense Refill     albuterol (PROAIR HFA/PROVENTIL HFA/VENTOLIN HFA) 108 (90 Base) MCG/ACT inhaler Inhale 2 puffs into the lungs every 4 hours as needed for shortness of breath / dyspnea or wheezing Inhale 2 puffs every 4 to 6 hours as needed.       apixaban ANTICOAGULANT (ELIQUIS) 2.5 MG tablet Take 1 tablet (2.5 mg) by mouth 2 times daily       carvedilol (COREG) 6.25 MG tablet Take 0.5 tablets (3.125 mg) by mouth 2 times daily (with meals) 180 tablet 1     furosemide (LASIX) 80 MG tablet Take 20 mg by mouth daily  90 tablet 3     glucagon 1 MG kit 1 mg as needed for low blood sugar       insulin aspart (NOVOLOG PEN) 100 UNIT/ML pen Inject 8 units  subcutaneous with breakfast, 8 units subcutaneous with lunch and 4 units with supper. 3 mL 0     insulin aspart (NovoLOG) inject - to fill ambulatory pump by Device route See Admin Instructions       losartan (COZAAR) 50 MG tablet Take 1 tablet (50 mg) by mouth 2 times daily 180 tablet 3     LOVASTATIN 20 MG PO TABS 1 TABLET DAILY AT DINNER 90 Tab 0     nystatin (MYCOSTATIN) 634089 UNIT/GM external cream Apply topically 2 times daily as needed        oxyCODONE (ROXICODONE) 5 MG tablet Take 1 tablet (5 mg) by mouth every 4 hours as needed for moderate to severe pain 6 tablet 0     senna-docusate (SENOKOT-S/PERICOLACE) 8.6-50 MG tablet Take 1-2 tablets by mouth 2 times daily as needed for constipation 20 tablet 0     spironolactone (ALDACTONE) 25 MG tablet Take 1 tablet (25 mg) by mouth daily  1     verapamil ER (VERELAN) 120 MG 24 hr capsule Take 1 capsule (120 mg) by mouth At Bedtime 90 capsule 1     insulin glargine (LANTUS PEN) 100 UNIT/ML pen Inject 14 Units Subcutaneous every morning         HPI:   Tc Garcia is a pleasant 80 year old patient of Dr. Julien who presents today a [facility] clinic follow up visit.     The patient has a complex history of the following -   # Chronic HFpEF  # PHTN out of proportion to LH disease, sildenafil previously tried but not tolerated due to fluctuating volume status and hypotension   # Chronic recurrent transudative L pleural effusion requiring pleurex catheter and ultimately chest tube placement (malignancy ruled out)  # PAF/PAFL, failed amiodarone due to prolonged QTc, now pursuing rate control approach  # Poorly-controlled DMII  # Chronic anemia requiring intermittent Fe infusions  # HTN  # HLP  # CKD with variable renal function response to diuretics, follows with [facility] (Vidhi Galo). Baseline creat ~ 1.5.  # Obesity  # Social - Lives with wife, Radha. Sedentary. High sodium diet, medication noncompliance, some concern over cognitive function     Recent  admissions:  - Beginning of May 2020 with progressive dyspnea and due to recurrent (transudative) pleural effusion a left PleurX catheter was placed. He was diuresed with IV furosemide and given empiric abx therapy. Echo showed normal LVEF 55-60% with normal wall motion. The RV was moderately dilated with decreased systolic function and mild TR. He underwent a RHC during that stay that showed mild PH (PA pressure of 28 mmHg (51/15), mean RA of 4mmHg, and mildly elevated filling pressures with a wedge of 12mmHg (Vwave 16). PVR was 3.9 Wood units and Carlos Mnauel CO/CI was 4.09/2.23. With vasodilator challenge, his PVR normalized to 1.22 wood units. Therefore, he was deemed to have pulmonary hypertension out of proportion to his left heart disease. He was started on Cialis 5mg daily but because this was not a formulated preparation, was switched to sildenafil 20 mg 3 times daily and discharged home. Interestingly, he was not discharged on any diuretics. Discharge wt was 152 lbs. His admission was complicated by atrial flutter for which he was given amiodarone but because of prolongation in QTc, this was discontinued, and he was continued on his PTA verapamil.    Following discharge he developed symptomatic hypotension and via phone was told to stop the sildenafil. He then saw Dr. Chad gusman for PH evaluation a few days later. He reported hypertension with SBP >200mmHg and weight was up 12 lbs from discharge. Torsemide 10 mg daily, Losartan 25 mg daily, and KCL 40 mEq were all started. She recommended resuming sildenafil once he was felt to be euvolemic.    - Readmitted from 5/21 to 5/29 with uncontrolled diabetes and recurrent acute HFpEF, related to medication noncompliance. He again required IV lasix and was discharged on Lasix 40mg BID in place of the torsemide. Due to renal concerns, his hydrochlorothiazide and losartan were held. Fortunately, home health was arranged to help him manage his medications at home.   -  "7/8-7/10 for re-accumulating pleural effusion, at which time IR evaluated his pleurex and was able to place to suction with 550ml fluid removed. He was also diuresed with good result.   - 7/10-7/18 for weakness/fall resulting in head contusion. He was found to have an empyema and received antibiotics. His pleurex was removed and a chest tube was placed. CT drained \"nearly all the fluid\" and was removed day prior to discharge.     Tc had a follow-up phone visit with Katlin on 8/11/20. His only complaint was of debilitating back pain for which he was seeing a spine specialist. He reported a stable weight from his most recent discharge, at 155 lbs. Labs showed a creatinine of 1.5, BUN 16, stable electrolytes, TSH slightly elevated but T4F WNL. She kept his Lasix the same at 40 mg BID and asked him to present to clinic for evaluation.  When I met him about a week later, he was feeling pretty well, despite a recent weight increase from 154 to 161 pounds.  I increased his Lasix to 80 qAM / 40 qPM, enrolled him in MHT and ordered a zio monitor for assessment of rate control in AF. This showed rate controlled persistent atrial fibrillation. I also enrolled in him pulmonary rehab.      After that, he quickly lost 10 lbs with significant symptomatic improvement, and I instructed him to return to his original Lasix dosing of 40 BID. However his weight quickly increased again and I increased his regimen to 60 qAM and 40 qPM.     At our last visit in mid September, he reported feeling really well at a weight in the upper 140s.  However, he did have uncontrolled hypertension and upon further review, realized that he did not have his losartan at home.  His labs demonstrated slight ABBIE with a creatinine up to 1.66, which I suspected was in the case of uncontrolled hypertension, and I reordered his losartan.  After that, his blood pressures came under better control, and his renal function improved to a creatinine of 1.25.    He " "did really well for some time after that, however over the past month his weight has again fluctuated pretty significantly.  Initially, he rapidly lost 10 pounds, which he stated was somewhat intentional, and we adjusted his goal weight to be lower as he was feeling really well at that weight.  However after that, he started to creep back up again and gained the 10 pounds back.  He also described feeling completely well at the higher weight, and so we readjusted his goal weight back to the original 145 to 150 pounds.  I did increase his dose of furosemide and also added spironolactone to his regimen.  A few days after that, he had an abdominal hernia repair, and thankfully his weight stayed stable postoperatively.     Since then, we've had some issues with controlling Tc's blood pressure. This is probably due to medication noncompliance due to confusion with the meds. I've asked Tc to bring his bottles in to clinic, but he hasn't yet. At his last visit with Dr. Julien on December 9th, she increased his Losartan to 50 mg BID. Additionally, his furosemide was changed from 80 mg daily to 40 mg daily, as Tc was not taking the higher dose consistently due to frequent urination.  Today, Tc tells me he's only taking 20 mg daily after another provider reduced it about ten days ago. Since then, his weight has been increasing by about a lb a day. He previously was down in the low-140's, now ~150 lbs for the past few days. As noted above, this has historically been his \"good\" weight. In line with that, he tells me he's feeling well from a cardiovascular standpoint. He says \"I've had two straight weeks of feeling great! I feel better than I have in the past year, actually!\" He denies peripheral edema. He tells me his prior abdominal discomfort post-hernia repair has resolved and \"I'm eating like a horse!\" which he's glad about. He avoids fast food, but does continue to eat corned beef hash intermittently. States he's " reading his labels and his wife helps him with that. He tells me he's been obtaining better BP's since increasing the Losartan but notes he has a wrist cuff which always reads higher than ours. We reviewed the labs that were performed on December 14 with his PCP, showing a creatinine of 1.33, BUN of 21, potassium of 4.1, sodium 138. Hgb was stable at 10.5. ALT and AST were in range, but alk phos was elevated at 449. He hasn't yet heard from his PCP regarding this, and plans to call her this week.     Assessment/Plan:  1. Acute on chronic HFpEF / cor pulmonale, with recurrent admissions (four in past four months) due to multiple comorbidities including pleural effusion, medication noncompliance, poorly controlled diabetes and anemia.               -- His most recent echo from May 2020 shows preserved EF 55-60%, with normal wall motion. RV was mild to moderately dilated with mildly decreased RV function. He had a RHC during his hospital stay in May as below. He has since had recurrent hospitalizations in the setting of intermittent volume overload but also a recurrent pleural effusion/empyema.                  -- FC II on Lasix 20 daily + [place] 25 daily.                 -- Enrolled in MHT with a goal range of 145-150 lbs. Currently 151 lbs and gaining steadily following reduction in furosemide to 20 mg daily. Increase furosemide to 40 mg daily.                  -- Counseled again on sodium intake (<2000 mg/day).                  -- Once we are back to implanting, he should be considered for a cardioMEMs.                 2. Pulmonary hypertension.              --RHC in May 2020 showed PA pressure of 28 mmHg (51/15), mean RA of 4mmHg, and mildly elevated filling pressures with a wedge of 12mmHg (Vwave 16). PVR was 3.9 Wood units and Carlos Manuel CO/CI was 4.09/2.23. With vasodilator challenge, his PVR normalized to 1.22 wood units. Therefore, he was deemed to have pulmonary hypertension out of proportion to his left heart  disease. Wt at that time was 150 lbs.               --As per Dr. Bonilla's recommendations, once he is euvolemic, our plan is to potentially reintroduce sildenafil. However, thus far, he has not proven to be a good or stable candidate for this.               -- He wishes to postpone pulmonary rehab to 2021 due to risk of coronavirus exposure.      3. Paroxysmal atrial fibrillation/flutter.              -- Recent zio monitor shows adequate rate control on verapamil.               --He is on anticoagulation with apixaban 2.5mg BID due to age and renal function.     4. Hypertension -- Reports better-controlled following Losartan increase to 100 daily. Advised to purchase Omron arm cuff today for better accuracy.      5. Anemia of chronic disease -- Most recent Hgb 10.5. Has received Fe infusions in the past with symptomatic improvement.      6. ABBIE - most recent creat 1.3. Creat over the past year has been variable, between 1.3 - 2.0.     7. Hx of medication confusion and non-compliance.     8. Elevated alk phos -- advised to call PCP this week to review.     Follow-up:    RN call in 3 weeks to review BP's after he picks up a new cuff at the pharmacy today.    With me in clinic in 6 weeks (+ medication bottles), labs the day prior.    Ame Mejía PA-C  [facility]  Pager: [phone]  Text Page  (7:30am - 4pm M-F)

## 2020-12-28 NOTE — LETTER
"12/28/2020    Jessika Evy Cordoba  [address]  [facility] [address]    RE: Tc Garcia       Dear Colleague,    I had the pleasure of seeing Tc Garcia in the [facility].    Tc Garcia is a 80 year old male who is being evaluated via a billable telephone visit.      The patient has been notified of following:     \"This telephone visit will be conducted via a call between you and your physician/provider. We have found that certain health care needs can be provided without the need for a physical exam.  This service lets us provide the care you need with a short phone conversation.  If a prescription is necessary we can send it directly to your pharmacy.  If lab work is needed we can place an order for that and you can then stop by our lab to have the test done at a later time.    Telephone visits are billed at different rates depending on your insurance coverage. During this emergency period, for some insurers they may be billed the same as an in-person visit.  Please reach out to your insurance provider with any questions.    If during the course of the call the physician/provider feels a telephone visit is not appropriate, you will not be charged for this service.\"    Patient has given verbal consent for Telephone visit?  Yes    What phone number would you like to be contacted at? [phone]    How would you like to obtain your AVS? Sebastien    Phone call duration: 16 minutes    LEXI LARA for CORE follow-ups:  - Any difficulty breathing, including when trying to lie in bed at night?No  - Any leg swelling (?compression or wraps), abdominal bloating, reduction in appetite? None  - Any chest pain or palpitations? none  - Weight trend over the past week? 144-151lb  - O2 or CPAP use? none  - Do they have a pulse oximeter they can use to check O2 sat/HR? Yes, 99% O2 saturation  Today's Home Vitals:  Vital signs reported by patient  BP. 128/76  P. 69  Wt. 151lb  Annette MANZO" GaloJer  ___________________  Tc Garcia is a 80 year old male who is being evaluated via a billable video visit.  This visit is being conducted as a virtual visit due to the emphasis on mitigation of the COVID-19 virus pandemic. The clinician has decided that the risk of an in-office visit outweighs the benefit for this patient.       I have reviewed and updated the patient's Past Medical History, Social History, Family History and Medication List.    PROBLEM LIST  Patient Active Problem List   Diagnosis     DM type 2, Hgb A1C 8.2 on 4/25/20     Mixed hyperlipidemia     Essential hypertension     Pain in limb     Plantar fascial fibromatosis     HYPERLIPIDEMIA LDL GOAL <100     Hemothorax on left     Recurrent Left Pleural effusion -- S/P Pleurex Cath 5/12/20     ABBIE (acute kidney injury) (H)     Acute respiratory failure with hypoxia (H)     Pulmonary hypertension (H)     CRF (chronic renal failure), stage 3      Anemia of chronic disease     Atrial fibrillation/flutter -- on Eliquis and Amiodarone     DKA (diabetic ketoacidoses) (H)     Anemia in CKD (chronic kidney disease)     Dyspnea     SOB (shortness of breath)     Non-recurrent bilateral inguinal hernia without obstruction or gangrene     Acute cholecystitis     Abdominal pain, generalized     Non-intractable vomiting with nausea, unspecified vomiting type     Alcohol dependence (H)     CHF (congestive heart failure) (H)       ALLERGIES  No known drug allergies    MEDICATIONS  Current Outpatient Medications   Medication Sig Dispense Refill     albuterol (PROAIR HFA/PROVENTIL HFA/VENTOLIN HFA) 108 (90 Base) MCG/ACT inhaler Inhale 2 puffs into the lungs every 4 hours as needed for shortness of breath / dyspnea or wheezing Inhale 2 puffs every 4 to 6 hours as needed.       apixaban ANTICOAGULANT (ELIQUIS) 2.5 MG tablet Take 1 tablet (2.5 mg) by mouth 2 times daily       carvedilol (COREG) 6.25 MG tablet Take 0.5 tablets (3.125 mg) by mouth 2 times  daily (with meals) 180 tablet 1     furosemide (LASIX) 80 MG tablet Take 20 mg by mouth daily  90 tablet 3     glucagon 1 MG kit 1 mg as needed for low blood sugar       insulin aspart (NOVOLOG PEN) 100 UNIT/ML pen Inject 8 units subcutaneous with breakfast, 8 units subcutaneous with lunch and 4 units with supper. 3 mL 0     insulin aspart (NovoLOG) inject - to fill ambulatory pump by Device route See Admin Instructions       losartan (COZAAR) 50 MG tablet Take 1 tablet (50 mg) by mouth 2 times daily 180 tablet 3     LOVASTATIN 20 MG PO TABS 1 TABLET DAILY AT DINNER 90 Tab 0     nystatin (MYCOSTATIN) 582586 UNIT/GM external cream Apply topically 2 times daily as needed        oxyCODONE (ROXICODONE) 5 MG tablet Take 1 tablet (5 mg) by mouth every 4 hours as needed for moderate to severe pain 6 tablet 0     senna-docusate (SENOKOT-S/PERICOLACE) 8.6-50 MG tablet Take 1-2 tablets by mouth 2 times daily as needed for constipation 20 tablet 0     spironolactone (ALDACTONE) 25 MG tablet Take 1 tablet (25 mg) by mouth daily  1     verapamil ER (VERELAN) 120 MG 24 hr capsule Take 1 capsule (120 mg) by mouth At Bedtime 90 capsule 1     insulin glargine (LANTUS PEN) 100 UNIT/ML pen Inject 14 Units Subcutaneous every morning         HPI:   Tc Garcia is a pleasant 80 year old patient of Dr. Julien who presents today a CORE clinic follow up visit.     The patient has a complex history of the following -   # Chronic HFpEF  # PHTN out of proportion to LH disease, sildenafil previously tried but not tolerated due to fluctuating volume status and hypotension   # Chronic recurrent transudative L pleural effusion requiring pleurex catheter and ultimately chest tube placement (malignancy ruled out)  # PAF/PAFL, failed amiodarone due to prolonged QTc, now pursuing rate control approach  # Poorly-controlled DMII  # Chronic anemia requiring intermittent Fe infusions  # HTN  # HLP  # CKD with variable renal function response to  diuretics, follows with [facility] nephrology (Vidhi aGlo). Baseline creat ~ 1.5.  # Obesity  # Social - Lives with wife, Radha. Sedentary. High sodium diet, medication noncompliance, some concern over cognitive function     Recent admissions:  - Beginning of May 2020 with progressive dyspnea and due to recurrent (transudative) pleural effusion a left PleurX catheter was placed. He was diuresed with IV furosemide and given empiric abx therapy. Echo showed normal LVEF 55-60% with normal wall motion. The RV was moderately dilated with decreased systolic function and mild TR. He underwent a RHC during that stay that showed mild PH (PA pressure of 28 mmHg (51/15), mean RA of 4mmHg, and mildly elevated filling pressures with a wedge of 12mmHg (Vwave 16). PVR was 3.9 Wood units and Carlos Manuel CO/CI was 4.09/2.23. With vasodilator challenge, his PVR normalized to 1.22 wood units. Therefore, he was deemed to have pulmonary hypertension out of proportion to his left heart disease. He was started on Cialis 5mg daily but because this was not a formulated preparation, was switched to sildenafil 20 mg 3 times daily and discharged home. Interestingly, he was not discharged on any diuretics. Discharge wt was 152 lbs. His admission was complicated by atrial flutter for which he was given amiodarone but because of prolongation in QTc, this was discontinued, and he was continued on his PTA verapamil.    Following discharge he developed symptomatic hypotension and via phone was told to stop the sildenafil. He then saw Dr. Chad gusman for PH evaluation a few days later. He reported hypertension with SBP >200mmHg and weight was up 12 lbs from discharge. Torsemide 10 mg daily, Losartan 25 mg daily, and KCL 40 mEq were all started. She recommended resuming sildenafil once he was felt to be euvolemic.    - Readmitted from 5/21 to 5/29 with uncontrolled diabetes and recurrent acute HFpEF, related to medication noncompliance. He again  "required IV lasix and was discharged on Lasix 40mg BID in place of the torsemide. Due to renal concerns, his hydrochlorothiazide and losartan were held. Fortunately, home health was arranged to help him manage his medications at home.   - 7/8-7/10 for re-accumulating pleural effusion, at which time IR evaluated his pleurex and was able to place to suction with 550ml fluid removed. He was also diuresed with good result.   - 7/10-7/18 for weakness/fall resulting in head contusion. He was found to have an empyema and received antibiotics. His pleurex was removed and a chest tube was placed. CT drained \"nearly all the fluid\" and was removed day prior to discharge.     Tc had a follow-up phone visit with Katlin on 8/11/20. His only complaint was of debilitating back pain for which he was seeing a spine specialist. He reported a stable weight from his most recent discharge, at 155 lbs. Labs showed a creatinine of 1.5, BUN 16, stable electrolytes, TSH slightly elevated but T4F WNL. She kept his Lasix the same at 40 mg BID and asked him to present to clinic for evaluation.  When I met him about a week later, he was feeling pretty well, despite a recent weight increase from 154 to 161 pounds.  I increased his Lasix to 80 qAM / 40 qPM, enrolled him in MHT and ordered a zio monitor for assessment of rate control in AF. This showed rate controlled persistent atrial fibrillation. I also enrolled in him pulmonary rehab.      After that, he quickly lost 10 lbs with significant symptomatic improvement, and I instructed him to return to his original Lasix dosing of 40 BID. However his weight quickly increased again and I increased his regimen to 60 qAM and 40 qPM.     At our last visit in mid September, he reported feeling really well at a weight in the upper 140s.  However, he did have uncontrolled hypertension and upon further review, realized that he did not have his losartan at home.  His labs demonstrated slight ABBIE with a " "creatinine up to 1.66, which I suspected was in the case of uncontrolled hypertension, and I reordered his losartan.  After that, his blood pressures came under better control, and his renal function improved to a creatinine of 1.25.    He did really well for some time after that, however over the past month his weight has again fluctuated pretty significantly.  Initially, he rapidly lost 10 pounds, which he stated was somewhat intentional, and we adjusted his goal weight to be lower as he was feeling really well at that weight.  However after that, he started to creep back up again and gained the 10 pounds back.  He also described feeling completely well at the higher weight, and so we readjusted his goal weight back to the original 145 to 150 pounds.  I did increase his dose of furosemide and also added spironolactone to his regimen.  A few days after that, he had an abdominal hernia repair, and thankfully his weight stayed stable postoperatively.     Since then, we've had some issues with controlling Tc's blood pressure. This is probably due to medication noncompliance due to confusion with the meds. I've asked Tc to bring his bottles in to clinic, but he hasn't yet. At his last visit with Dr. Julien on December 9th, she increased his Losartan to 50 mg BID. Additionally, his furosemide was changed from 80 mg daily to 40 mg daily, as Tc was not taking the higher dose consistently due to frequent urination.  Today, Tc tells me he's only taking 20 mg daily after another provider reduced it about ten days ago. Since then, his weight has been increasing by about a lb a day. He previously was down in the low-140's, now ~150 lbs for the past few days. As noted above, this has historically been his \"good\" weight. In line with that, he tells me he's feeling well from a cardiovascular standpoint. He says \"I've had two straight weeks of feeling great! I feel better than I have in the past year, actually!\" He denies " "peripheral edema. He tells me his prior abdominal discomfort post-hernia repair has resolved and \"I'm eating like a horse!\" which he's glad about. He avoids fast food, but does continue to eat corned beef hash intermittently. States he's reading his labels and his wife helps him with that. He tells me he's been obtaining better BP's since increasing the Losartan but notes he has a wrist cuff which always reads higher than ours. We reviewed the labs that were performed on December 14 with his PCP, showing a creatinine of 1.33, BUN of 21, potassium of 4.1, sodium 138. Hgb was stable at 10.5. ALT and AST were in range, but alk phos was elevated at 449. He hasn't yet heard from his PCP regarding this, and plans to call her this week.     Assessment/Plan:  1. Acute on chronic HFpEF / cor pulmonale, with recurrent admissions (four in past four months) due to multiple comorbidities including pleural effusion, medication noncompliance, poorly controlled diabetes and anemia.               -- His most recent echo from May 2020 shows preserved EF 55-60%, with normal wall motion. RV was mild to moderately dilated with mildly decreased RV function. He had a RHC during his hospital stay in May as below. He has since had recurrent hospitalizations in the setting of intermittent volume overload but also a recurrent pleural effusion/empyema.                  -- FC II on Lasix 20 daily + Ambler 25 daily.                 -- Enrolled in MHT with a goal range of 145-150 lbs. Currently 151 lbs and gaining steadily following reduction in furosemide to 20 mg daily. Increase furosemide to 40 mg daily.                  -- Counseled again on sodium intake (<2000 mg/day).                  -- Once we are back to implanting, he should be considered for a cardioMEMs.                 2. Pulmonary hypertension.              --RHC in May 2020 showed PA pressure of 28 mmHg (51/15), mean RA of 4mmHg, and mildly elevated filling pressures with a wedge " of 12mmHg (Vwave 16). PVR was 3.9 Wood units and Carlos Manuel CO/CI was 4.09/2.23. With vasodilator challenge, his PVR normalized to 1.22 wood units. Therefore, he was deemed to have pulmonary hypertension out of proportion to his left heart disease. Wt at that time was 150 lbs.               --As per Dr. Bonilla's recommendations, once he is euvolemic, our plan is to potentially reintroduce sildenafil. However, thus far, he has not proven to be a good or stable candidate for this.               -- He wishes to postpone pulmonary rehab to 2021 due to risk of coronavirus exposure.      3. Paroxysmal atrial fibrillation/flutter.              -- Recent zio monitor shows adequate rate control on verapamil.               --He is on anticoagulation with apixaban 2.5mg BID due to age and renal function.     4. Hypertension -- Reports better-controlled following Losartan increase to 100 daily. Advised to purchase Omron arm cuff today for better accuracy.      5. Anemia of chronic disease -- Most recent Hgb 10.5. Has received Fe infusions in the past with symptomatic improvement.      6. ABBIE - most recent creat 1.3. Creat over the past year has been variable, between 1.3 - 2.0.     7. Hx of medication confusion and non-compliance.     8. Elevated alk phos -- advised to call PCP this week to review.     Follow-up:    RN call in 3 weeks to review BP's after he picks up a new cuff at the pharmacy today.    With me in clinic in 6 weeks (+ medication bottles), labs the day prior.    Ame Mejía PA-C  [facility]  Pager: [phone]  Text Page  (7:30am - 4pm M-F)       Thank you for allowing me to participate in the care of your patient.    Sincerely,     Ame Mejía PA-C     [facility]    cc:   Charlie Finch MD  [address]  [place] [id number]

## 2020-12-30 ENCOUNTER — CARE COORDINATION (OUTPATIENT)
Dept: CARDIOLOGY | Facility: CLINIC | Age: 80
End: 2020-12-30

## 2020-12-30 NOTE — PROGRESS NOTES
[facility]  My Health Tracker HF Alert    Situation/Background:    Wt above goal    Today's home wt: 152# Goal wt range: 145# - 150#  Recent wts:    Current diuretic regimen: Lasix 40 mg daily (increased 12/28 for wt of 151#)      Assessment/Recommendations:    Wt stable, no symptoms. Very recent Lasix increase. Will continue to monitor.    BAYRON VanN, RN, PHN, HNB-BC   12/30/2020 at 11:57 AM

## 2020-12-31 ENCOUNTER — CARE COORDINATION (OUTPATIENT)
Dept: CARDIOLOGY | Facility: CLINIC | Age: 80
End: 2020-12-31

## 2020-12-31 DIAGNOSIS — J90 RECURRENT PLEURAL EFFUSION ON LEFT: ICD-10-CM

## 2020-12-31 NOTE — PROGRESS NOTES
"[facility]  My Health Tracker HF Alert - weight above goal       Today's home wt:   153# - up another # from yesterday  Goal wt:   145 - 150#  Recent wts:  MyHealth Tracker CHF Flowsheet My weight today is:   12/24/2020 149   12/25/2020 149   12/26/2020 150   12/27/2020 150   12/29/2020 151   12/30/2020 152   12/31/2020 153     Heart Failure sx: patient did not report any w/transmission. Called to assess. He stated that h e feels the best he has in years. No SOB/LUCIANO, sleeping without orthopnea/PND. He further denied any abdominal bloating and added that his legs are \"skinny.\"  Current daily diuretic:    Lasix 40mg/d - confirmed this recent dose increase. Patient stated he started this on 12/29. He added that he is urinating more on this dose    As patient feels well and his lasix recently increased will continue to closely monitor.  Argelia Bacon RN on 12/31/2020 at 11:52 AM      "

## 2021-01-04 NOTE — PROGRESS NOTES
"[facility]    MHT update. Wt up to 154# Friday - today. Goal wt is 145-150#. He spoke with my colleague, STACIE Bacon RN on Thursday and reported that he was feeling \"the best he has in years\".    Called Tc again today. He reports that he is continuing to feel very well. Denies HF sx today. Continues on recently increased Lasix dose of 40 mg daily and reports adequate urination. He states he is feeling much better now that his gallbladder is healing, which is causing an increase in appetite. He feels wt gain is caloric.    Will route to Ame Mejía PA-C.    Eneida Domingo, BAYRONN, RN, PHN, HNB-BC   1/4/2021 at 11:24 AM             "

## 2021-01-05 RX ORDER — FUROSEMIDE 40 MG
60 TABLET ORAL DAILY
Qty: 90 TABLET | Refills: 3 | COMMUNITY
Start: 2021-01-05 | End: 2021-02-09

## 2021-01-05 NOTE — PROGRESS NOTES
[facility]  Called pt, reviewed instructions per Diana BUCIO. He stated understanding and will increase Lasix to 60mg or 1.5 tablets daily. Wt parameters updated, med list changed.     Dilma Rene RN, BSN, CHFN  01/05/21 at 3:21 PM

## 2021-01-08 ENCOUNTER — CARE COORDINATION (OUTPATIENT)
Dept: CARDIOLOGY | Facility: CLINIC | Age: 81
End: 2021-01-08

## 2021-01-08 NOTE — PROGRESS NOTES
[facility] - CORE Clinic Telemanagement  My Health Tracker HF Alert     Weight today: 154#    Weight goal: 146-152#  MyHealth Tracker CHF Flowsheet My weight today is:   12/31/2020 153   1/1/2021 154   1/2/2021 154   1/3/2021 154   1/4/2021 154   1/5/2021 153   1/6/2021 154   1/7/2021 154   1/8/2021 154     Heart Failure sx: none reported    Daily diuretic plan: Lasix 60mg daily    Notes: Wt remains above goal. Lasix increased 1/5, pt started increased dose on 1/6. No symptoms reported. Will continue to monitor as pt not reporting symptoms and pt just started increased dose. Will update FORTUNATO Ortiz next week, if weight remains above new goal.    No future appointments.      Dilma Rene RN, BSN, CHFN  01/08/21 at 10:14 AM

## 2021-01-12 ENCOUNTER — CARE COORDINATION (OUTPATIENT)
Dept: CARDIOLOGY | Facility: CLINIC | Age: 81
End: 2021-01-12

## 2021-01-12 NOTE — PROGRESS NOTES
Alfonzo, Ame Vazquez PA-C  You Just now (3:03 PM)     Sure! Let's do 150 - 156 lbs. Thanks!          Wt parameters adjusted.     Dilma Rene RN, BSN, CHFN  01/12/21 at 3:04 PM

## 2021-01-12 NOTE — PROGRESS NOTES
[facility] - CORE Clinic Telemanagement  My Health Tracker HF Alert     Weight today: 155#    Weight goal:   MyHealth Tracker CHF Flowsheet My weight today is:   12/30/2020 152   12/31/2020 153   1/1/2021 154   1/2/2021 154   1/3/2021 154   1/4/2021 154   1/5/2021 153   1/6/2021 154   1/7/2021 154   1/8/2021 154   1/10/2021 154   1/12/2021 155     Heart Failure sx: none reported    Daily diuretic plan:   Lasix 60mg daily  Spironolactone 25mg daily    Notes: Wt remains stable above goal, despite increase in Lasix from 40mg to 60mg daily on 1/5/21, increased dose started 1/6. Pt has CORE orders/labs for follow early 2/2021, not yet scheduled. Called pt, he reports doing great. He confirmed he increased Lasix last week as instructed. He denies increased SOB or swelling. He is up and about doing things. He again reports to be feeling the best he has felt in a while and really feeling great. Reviewed he is due for follow up in a bout 3 weeks. Will have scheduling call him to arrange. FYI sent to FORTUNATO Ortiz.     No future appointments.      Dilma Rene RN, BSN, CHFN  01/12/21 at 2:44 PM

## 2021-01-15 ENCOUNTER — HEALTH MAINTENANCE LETTER (OUTPATIENT)
Age: 81
End: 2021-01-15

## 2021-01-19 ENCOUNTER — CARE COORDINATION (OUTPATIENT)
Dept: CARDIOLOGY | Facility: CLINIC | Age: 81
End: 2021-01-19

## 2021-01-19 DIAGNOSIS — I50.33 ACUTE ON CHRONIC HEART FAILURE WITH PRESERVED EJECTION FRACTION (HFPEF) (H): ICD-10-CM

## 2021-01-19 NOTE — PROGRESS NOTES
[facility]  Pt had virtual visit 12/28 with FORTUNATO Ortiz. She recommended pt get new BP cuff and asked RN to call in a few weeks for BP updates. Also of note, pt to have visit in clinic again in 6 weeks with labs the day prior. This has not yet been scheduled. Checked MyHealth Tracker, weights stable and within new goal (150-156#).     Called pt for BP update, no answer. LVM for call back with BP updates and to get February CORE follow up arranged.     MyHealth Tracker CHF Flowsheet My weight today is:   1/12/2021 155   1/13/2021 154   1/14/2021 154   1/15/2021 153   1/16/2021 153   1/17/2021 153   1/18/2021 154   1/19/2021 154     Dilma Rene RN, BSN, CHFN  01/19/21 at 9:47 AM

## 2021-01-25 NOTE — PROGRESS NOTES
[facility]  Have not heard back from pt. Called pt again to get update from him on BP and new cuff. Pt reports he has been checking BP most days. He reports that he typically checks it in the afternoon. He takes his am meds around 9am, and then takes BP in the afternoon, when he remembers. No exact time. See BP's below. He does not record HR's. Pt reports feeling well. His weights have been stable and within goal. Also reviewed he is due for his 6 week in clinic CORE follow up with labs in about 2 weeks. Pt agreeable to schedule. Arranged for visit with labs on 2/9/21 with FORTUNATO Ortiz.     1/25 157/84  1/21 150/89  1/20 144/89  1/19 155/93  1/18 154/85  1/17 155/76   1/16 168/86    Medications:  Carvedilol 6.25mg tablet, 1/2 tablet BID (3.125mg BID)  Lasix 40mg tablet, 1 1/2 tablets daily (60mg daily)  Losartan 50mg tablet, 1 tablet BID   Spironolactone 25mg tablet, 1 tablet daily  Verapamil 120mg capsule, 1 capsule daily at bedtime    Sent to FORTUNATO Ortiz for review.     Dilma Rene RN, BSN, CHFN  01/25/21 at 1:14 PM

## 2021-01-26 RX ORDER — SPIRONOLACTONE 25 MG/1
50 TABLET ORAL DAILY
Refills: 1 | COMMUNITY
Start: 2021-01-26 | End: 2021-02-08

## 2021-01-26 NOTE — PROGRESS NOTES
Called pt, reviewed BP meds with him. He read to me what he was taking. He is taking all medication doses correctly. He was taking Carvedilol 6.25mg once daily, instead of cutting it in half and taking 3.125mg BID. Did recommended he take it 1/2 tab BID, as it is meant to be a BID medication. Reviewed recommendation to increase Spironolactone to 50mg daily. Pt confirmed instructions. Pt is scheduled for labs already in 2 weeks. Med list updated. Pt just picked up prescription for Spironolactone and has plenty. Will not send new Rx yet. Will await visit with FORTUNATO Ortiz and labs before updating pharmacy with updated dosing. Pt agreeable to plan.    Dilma Rene RN, BSN, CHFN  01/26/21 at 11:28 AM

## 2021-01-26 NOTE — PROGRESS NOTES
Can you confirm that he's taking all his BP meds? (hx of noncompliance). If so, will increase joslyn to 50 mg daily. BMP in 2 weeks. Thanks.

## 2021-01-28 DIAGNOSIS — D64.9 ANEMIA, UNSPECIFIED: ICD-10-CM

## 2021-01-28 DIAGNOSIS — I10 ESSENTIAL HYPERTENSION, MALIGNANT: ICD-10-CM

## 2021-01-28 DIAGNOSIS — N18.30 CHRONIC KIDNEY DISEASE, STAGE III (MODERATE) (H): Primary | ICD-10-CM

## 2021-02-05 ENCOUNTER — TELEPHONE (OUTPATIENT)
Dept: CARDIOLOGY | Facility: CLINIC | Age: 81
End: 2021-02-05

## 2021-02-05 NOTE — TELEPHONE ENCOUNTER
Dr. Julien reviewed this and was OK with pt holding his Eliquis medication.     Left detailed voicemail for [facility] regarding recommendations.    Spoke with pt to see if procedure is still scheduled for Monday, and it was rescheduled to Wednesday.   Pt said he will need to hold it for 2-3 days which is what he has done in the past for other procedures.   Pt gave verbal understanding and will be in touch with [facility] on Monday.

## 2021-02-05 NOTE — TELEPHONE ENCOUNTER
Received call from [facility] regarding pt's Eliquis medication.   Pt is scheduled to have a root canal and extraction on Monday 2/8/21.  Pt has hx of heart failure with preserved ejection fraction, pulmonary hypertension, and persistent atrial fibrillation.  Recent zio patch from December showed adequate rate control on verapamil.   Dr. Julien out of the office til next week, will route to Dr. Collins to review.

## 2021-02-08 DIAGNOSIS — I50.33 ACUTE ON CHRONIC HEART FAILURE WITH PRESERVED EJECTION FRACTION (HFPEF) (H): ICD-10-CM

## 2021-02-08 RX ORDER — SPIRONOLACTONE 25 MG/1
50 TABLET ORAL DAILY
Qty: 180 TABLET | Refills: 3 | Status: SHIPPED | OUTPATIENT
Start: 2021-02-08 | End: 2021-06-09

## 2021-02-09 ENCOUNTER — CARE COORDINATION (OUTPATIENT)
Dept: CARDIOLOGY | Facility: CLINIC | Age: 81
End: 2021-02-09

## 2021-02-09 ENCOUNTER — VIRTUAL VISIT (OUTPATIENT)
Dept: CARDIOLOGY | Facility: CLINIC | Age: 81
End: 2021-02-09
Attending: PHYSICIAN ASSISTANT
Payer: COMMERCIAL

## 2021-02-09 DIAGNOSIS — J90 RECURRENT PLEURAL EFFUSION ON LEFT: ICD-10-CM

## 2021-02-09 DIAGNOSIS — I50.32 CHRONIC HEART FAILURE WITH PRESERVED EJECTION FRACTION (HFPEF) (H): ICD-10-CM

## 2021-02-09 DIAGNOSIS — I27.20 PULMONARY HYPERTENSION (H): Primary | ICD-10-CM

## 2021-02-09 DIAGNOSIS — I48.20 CHRONIC ATRIAL FIBRILLATION (H): ICD-10-CM

## 2021-02-09 LAB
ANION GAP SERPL CALCULATED.3IONS-SCNC: 5 MMOL/L (ref 3–14)
BUN SERPL-MCNC: 28 MG/DL (ref 7–30)
CALCIUM SERPL-MCNC: 9.7 MG/DL (ref 8.5–10.1)
CHLORIDE SERPL-SCNC: 103 MMOL/L (ref 94–109)
CO2 SERPL-SCNC: 27 MMOL/L (ref 20–32)
CREAT SERPL-MCNC: 1.38 MG/DL (ref 0.66–1.25)
GFR SERPL CREATININE-BSD FRML MDRD: 47 ML/MIN/{1.73_M2}
GLUCOSE SERPL-MCNC: 282 MG/DL (ref 70–99)
HGB BLD-MCNC: 11.8 G/DL (ref 13.3–17.7)
NT-PROBNP SERPL-MCNC: 3149 PG/ML (ref 0–450)
POTASSIUM SERPL-SCNC: 5.2 MMOL/L (ref 3.4–5.3)
SODIUM SERPL-SCNC: 135 MMOL/L (ref 133–144)

## 2021-02-09 PROCEDURE — 85018 HEMOGLOBIN: CPT | Performed by: PHYSICIAN ASSISTANT

## 2021-02-09 PROCEDURE — 99214 OFFICE O/P EST MOD 30 MIN: CPT | Mod: 95 | Performed by: PHYSICIAN ASSISTANT

## 2021-02-09 PROCEDURE — 83880 ASSAY OF NATRIURETIC PEPTIDE: CPT | Performed by: PHYSICIAN ASSISTANT

## 2021-02-09 PROCEDURE — 80048 BASIC METABOLIC PNL TOTAL CA: CPT | Performed by: PHYSICIAN ASSISTANT

## 2021-02-09 PROCEDURE — 36415 COLL VENOUS BLD VENIPUNCTURE: CPT | Performed by: PHYSICIAN ASSISTANT

## 2021-02-09 RX ORDER — FUROSEMIDE 40 MG
40 TABLET ORAL DAILY
Qty: 90 TABLET | Refills: 3 | Status: ON HOLD | COMMUNITY
Start: 2021-02-09 | End: 2021-03-19

## 2021-02-09 NOTE — LETTER
"2/9/2021    Jessika Cordoba  [address]  [place] [address]    RE: Tc Garcia       Dear Colleague,    I had the pleasure of seeing Tc Garcia in the [facility].    Tc is a 80 year old who is being evaluated via a billable telephone visit.      What phone number would you like to be contacted at? [phone]  How would you like to obtain your AVS? MyChart    Objective    Vitals - Patient Reported  Weight (Patient Reported): 69.4 kg (153 lb)  Height (Patient Reported): 175.3 cm (5' 9\")  BMI (Based on Pt Reported Ht/Wt): 22.59    Phone call duration: 20 minutes  Charting, review, orders: 15 minutes  _________________________    Tc Garcia is a 80 year old male who is being evaluated via a billable video visit.  This visit is being conducted as a virtual visit due to the emphasis on mitigation of the COVID-19 virus pandemic. The clinician has decided that the risk of an in-office visit outweighs the benefit for this patient.       I have reviewed and updated the patient's Past Medical History, Social History, Family History and Medication List.    PROBLEM LIST  Patient Active Problem List   Diagnosis     DM type 2, Hgb A1C 8.2 on 4/25/20     Mixed hyperlipidemia     Essential hypertension     Pain in limb     Plantar fascial fibromatosis     HYPERLIPIDEMIA LDL GOAL <100     Hemothorax on left     Recurrent Left Pleural effusion -- S/P Pleurex Cath 5/12/20     ABBIE (acute kidney injury) (H)     Acute respiratory failure with hypoxia (H)     Pulmonary hypertension (H)     CRF (chronic renal failure), stage 3      Anemia of chronic disease     Atrial fibrillation/flutter -- on Eliquis and Amiodarone     DKA (diabetic ketoacidoses) (H)     Anemia in CKD (chronic kidney disease)     Dyspnea     SOB (shortness of breath)     Non-recurrent bilateral inguinal hernia without obstruction or gangrene     Acute cholecystitis     Abdominal pain, " generalized     Non-intractable vomiting with nausea, unspecified vomiting type     Alcohol dependence (H)     CHF (congestive heart failure) (H)       ALLERGIES  No known drug allergies    MEDICATIONS  Current Outpatient Medications   Medication Sig Dispense Refill     albuterol (PROAIR HFA/PROVENTIL HFA/VENTOLIN HFA) 108 (90 Base) MCG/ACT inhaler Inhale 2 puffs into the lungs every 4 hours as needed for shortness of breath / dyspnea or wheezing Inhale 2 puffs every 4 to 6 hours as needed.       apixaban ANTICOAGULANT (ELIQUIS) 2.5 MG tablet Take 1 tablet (2.5 mg) by mouth 2 times daily       carvedilol (COREG) 6.25 MG tablet Take 0.5 tablets (3.125 mg) by mouth 2 times daily (with meals) 180 tablet 1     furosemide (LASIX) 40 MG tablet Take 1.5 tablets (60 mg) by mouth daily 90 tablet 3     glucagon 1 MG kit 1 mg as needed for low blood sugar       insulin aspart (NOVOLOG PEN) 100 UNIT/ML pen Inject 8 units subcutaneous with breakfast, 8 units subcutaneous with lunch and 4 units with supper. 3 mL 0     insulin aspart (NovoLOG) inject - to fill ambulatory pump by Device route See Admin Instructions       insulin glargine (LANTUS PEN) 100 UNIT/ML pen Inject 14 Units Subcutaneous every morning       losartan (COZAAR) 50 MG tablet Take 1 tablet (50 mg) by mouth 2 times daily 180 tablet 3     LOVASTATIN 20 MG PO TABS 1 TABLET DAILY AT DINNER 90 Tab 0     nystatin (MYCOSTATIN) 844507 UNIT/GM external cream Apply topically 2 times daily as needed        oxyCODONE (ROXICODONE) 5 MG tablet Take 1 tablet (5 mg) by mouth every 4 hours as needed for moderate to severe pain 6 tablet 0     senna-docusate (SENOKOT-S/PERICOLACE) 8.6-50 MG tablet Take 1-2 tablets by mouth 2 times daily as needed for constipation 20 tablet 0     spironolactone (ALDACTONE) 25 MG tablet Take 2 tablets (50 mg) by mouth daily 180 tablet 3     verapamil ER (VERELAN) 120 MG 24 hr capsule Take 1 capsule (120 mg) by mouth At Bedtime 90 capsule 1       HPI:    Tc Garcia is a pleasant 80 year old patient of Dr. Julien who presents today a [facility] clinic follow up visit.     The patient has a complex history of the following -   # Chronic HFpEF, with multiple admissions in 2020.  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 Baseline creat ~ 1.3.  # Obesity  # Hx of medication confusion and non-compliance, self-adjusts diuretics frequently.  # Social - Lives with wife, Radha. Sedentary. High sodium diet, medication noncompliance, some concern over cognitive function and medical literacy.     Recent admissions:  - Beginning of May 2020 with progressive dyspnea and due to recurrent (transudative) pleural effusion a left PleurX catheter was placed. He was diuresed with IV furosemide and given empiric abx therapy. Echo showed normal LVEF 55-60% with normal wall motion. The RV was moderately dilated with decreased systolic function and mild TR. He underwent a RHC during that stay that showed mild PH (PA pressure of 28 mmHg (51/15), mean RA of 4mmHg, and mildly elevated filling pressures with a wedge of 12mmHg (Vwave 16). PVR was 3.9 Wood units and Carlos Manuel CO/CI was 4.09/2.23. With vasodilator challenge, his PVR normalized to 1.22 wood units. Therefore, he was deemed to have pulmonary hypertension out of proportion to his left heart disease. He was started on Cialis 5mg daily but because this was not a formulated  "preparation, was switched to sildenafil 20 mg 3 times daily and discharged home. Interestingly, he was not discharged on any diuretics. Discharge wt was 152 lbs. His admission was complicated by atrial flutter for which he was given amiodarone but because of prolongation in QTc, this was discontinued, and he was continued on his PTA verapamil.    Following discharge he developed symptomatic hypotension and via phone was told to stop the sildenafil. He then saw Dr. Chad gusman for PH evaluation a few days later. He reported hypertension with SBP >200mmHg and weight was up 12 lbs from discharge. Torsemide 10 mg daily, Losartan 25 mg daily, and KCL 40 mEq were all started. She recommended resuming sildenafil once he was felt to be euvolemic.    - Readmitted from 5/21 to 5/29 with uncontrolled diabetes and recurrent acute HFpEF, related to medication noncompliance. He again required IV lasix and was discharged on Lasix 40mg BID in place of the torsemide. Due to renal concerns, his hydrochlorothiazide and losartan were held. Fortunately, home health was arranged to help him manage his medications at home.   - 7/8-7/10/20 for re-accumulating pleural effusion, at which time IR evaluated his pleurex and was able to place to suction with 550ml fluid removed. He was also diuresed with good result.   - 7/10-7/18/20 for weakness/fall resulting in head contusion. He was found to have an empyema and received antibiotics. His pleurex was removed and a chest tube was placed. CT drained \"nearly all the fluid\" and was removed day prior to discharge.  ____________________________________    Since the admissions in 2020, I've been following Tc closely in [facility] and enrolled him in the T system. His weights and symptoms have fluctuated (typically related to diet, uncontrolled blood pressures and self-dosing diuretics), and most recently he had a bit of a prolonged recovery following abdominal hernia repair, but I'm happy to say he " hasn't been readmitted with heart failure since the summer. Please see my note dated 12/28/2020 for details.     Most recently, I increased his spironolactone from 25-50 mg daily for uncontrolled hypertension.  He saw Vidhi Galo of nephrology shortly after that on January 26, and at that time remained hypertensive with a blood pressure of 153/73 mmHg, at a clinic weight of 157 pounds.  Creatinine was noted to be just slightly above his baseline at 1.33.  No changes were made, but iron studies were placed, to be obtained at our next office visit.  Unfortunately, it does not look like those were drawn today.    Today, Tc presents for a routine six week follow up visit. BMP today reveals a creatinine of 1.38, a BUN of 28 and a potassium of 5.2.  The proBNP is stable around 3000, hemoglobin mildly reduced at 11.8, but improved from December.  His glucose is again uncontrolled at 282.  This is a nonfasting lab. However hells me he had his A1c checked with endocrinology last week (note/result not available to me) and his A1c was better at 7.4% (previously >8).   Review of his my health tracker flowsheet shows that over the past 3 weeks, his weight has been stable between 150-155 pounds, which is within his goal weight parameters. Of note, and not surprisingly, he's only taking the furosemide at 40 mg daily instead of as prescribed at 60 mg daily.   He continues to feel really well from a cardiovascular standpoint. Not limited by dyspnea. Denies peripheral edema. States BP's have been significantly better since increasing the Abraham, 110's-120's typically systolic. Needs a refill on Eliquis. I note that he qualifies now for the standard dose of Eliquis at 5 mg BID.   His only real complaint today is of a flair in his chronic/intermittent back pain, for which he's having a CT scan with [facility] in the near future.   Had his first COVID-19 vaccine last week.     Assessment/Plan:  1. Chronic HFpEF / cor pulmonale                  -- FC II sxs on Lasix 40 daily + Arnold 50 daily.                 -- Enrolled in MHT with a goal range of 150-156 lbs. Maintaining.  2. Pulmonary hypertension. Previously failed sildenafil, but has recently been more stable from a CHF standpoint.    -- Will place an order for pulmonary rehab, now that he's had his COVID-19 vaccine (had previously requested to postpone this due to risk). May consider re-referral to PHTN clinic.  3. Paroxysmal atrial fibrillation/flutter, rate-controlled, asymptomatic.              -- Was on low-dose apixaban 2.5mg BID due to age and renal function - now qualifies for 5 mg dose. Rx sent.  4. Hypertension -- Now better-controlled.   5. Anemia of chronic disease, stable. Will see if we can add on Fe studies ordered by nephrology to labs drawn today.  6. CKD-III - Creat over the past year has been variable, between 1.3 - 2.0; most recently ~1.2-1.4 x months. Followed by [facility].    -- Discussed relative potassium restricted diet today, given potassium level of 5.2.   -- Labs to be repeated with neph in April.    Follow-up: With me in clinic in 3 months (bring in medication bottles and BP cuff), echocardiogram and labs the day prior.    KAMILLA Espino [facility]  Pager: [phone]  Text Page  (7:30am - 4pm M-F)       Thank you for allowing me to participate in the care of your patient.    Sincerely,     KAMILLA Espino [facility]

## 2021-02-09 NOTE — PROGRESS NOTES
"Tc is a 80 year old who is being evaluated via a billable telephone visit.      What phone number would you like to be contacted at? [phone]  How would you like to obtain your AVS? Sebastien    Objective    Vitals - Patient Reported  Weight (Patient Reported): 69.4 kg (153 lb)  Height (Patient Reported): 175.3 cm (5' 9\")  BMI (Based on Pt Reported Ht/Wt): 22.59    Phone call duration: 20 minutes  Charting, review, orders: 15 minutes  _________________________    Tc Garcia is a 80 year old male who is being evaluated via a billable video visit.  This visit is being conducted as a virtual visit due to the emphasis on mitigation of the COVID-19 virus pandemic. The clinician has decided that the risk of an in-office visit outweighs the benefit for this patient.       I have reviewed and updated the patient's Past Medical History, Social History, Family History and Medication List.    PROBLEM LIST  Patient Active Problem List   Diagnosis     DM type 2, Hgb A1C 8.2 on 4/25/20     Mixed hyperlipidemia     Essential hypertension     Pain in limb     Plantar fascial fibromatosis     HYPERLIPIDEMIA LDL GOAL <100     Hemothorax on left     Recurrent Left Pleural effusion -- S/P Pleurex Cath 5/12/20     ABBIE (acute kidney injury) (H)     Acute respiratory failure with hypoxia (H)     Pulmonary hypertension (H)     CRF (chronic renal failure), stage 3      Anemia of chronic disease     Atrial fibrillation/flutter -- on Eliquis and Amiodarone     DKA (diabetic ketoacidoses) (H)     Anemia in CKD (chronic kidney disease)     Dyspnea     SOB (shortness of breath)     Non-recurrent bilateral inguinal hernia without obstruction or gangrene     Acute cholecystitis     Abdominal pain, generalized     Non-intractable vomiting with nausea, unspecified vomiting type     Alcohol dependence (H)     CHF (congestive heart failure) (H)       ALLERGIES  No known drug allergies    MEDICATIONS  Current Outpatient Medications   Medication " Sig Dispense Refill     albuterol (PROAIR HFA/PROVENTIL HFA/VENTOLIN HFA) 108 (90 Base) MCG/ACT inhaler Inhale 2 puffs into the lungs every 4 hours as needed for shortness of breath / dyspnea or wheezing Inhale 2 puffs every 4 to 6 hours as needed.       apixaban ANTICOAGULANT (ELIQUIS) 2.5 MG tablet Take 1 tablet (2.5 mg) by mouth 2 times daily       carvedilol (COREG) 6.25 MG tablet Take 0.5 tablets (3.125 mg) by mouth 2 times daily (with meals) 180 tablet 1     furosemide (LASIX) 40 MG tablet Take 1.5 tablets (60 mg) by mouth daily 90 tablet 3     glucagon 1 MG kit 1 mg as needed for low blood sugar       insulin aspart (NOVOLOG PEN) 100 UNIT/ML pen Inject 8 units subcutaneous with breakfast, 8 units subcutaneous with lunch and 4 units with supper. 3 mL 0     insulin aspart (NovoLOG) inject - to fill ambulatory pump by Device route See Admin Instructions       insulin glargine (LANTUS PEN) 100 UNIT/ML pen Inject 14 Units Subcutaneous every morning       losartan (COZAAR) 50 MG tablet Take 1 tablet (50 mg) by mouth 2 times daily 180 tablet 3     LOVASTATIN 20 MG PO TABS 1 TABLET DAILY AT DINNER 90 Tab 0     nystatin (MYCOSTATIN) 312103 UNIT/GM external cream Apply topically 2 times daily as needed        oxyCODONE (ROXICODONE) 5 MG tablet Take 1 tablet (5 mg) by mouth every 4 hours as needed for moderate to severe pain 6 tablet 0     senna-docusate (SENOKOT-S/PERICOLACE) 8.6-50 MG tablet Take 1-2 tablets by mouth 2 times daily as needed for constipation 20 tablet 0     spironolactone (ALDACTONE) 25 MG tablet Take 2 tablets (50 mg) by mouth daily 180 tablet 3     verapamil ER (VERELAN) 120 MG 24 hr capsule Take 1 capsule (120 mg) by mouth At Bedtime 90 capsule 1       HPI:   Tc Garcia is a pleasant 80 year old patient of Dr. Julien who presents today a [facility] clinic follow up visit.     The patient has a complex history of the following -   # Chronic HFpEF, with multiple admissions in 2020.  # PHTN out of  proportion to LH disease, sildenafil previously tried but not tolerated due to fluctuating volume status, hypotension and issues with med compliance.  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 Baseline creat ~ 1.3.  # Obesity  # Hx of medication confusion and non-compliance, self-adjusts diuretics frequently.  # Social - Lives with wife, Radha. Sedentary. High sodium diet, medication noncompliance, some concern over cognitive function and medical literacy.     Recent admissions:  - Beginning of May 2020 with progressive dyspnea and due to recurrent (transudative) pleural effusion a left PleurX catheter was placed. He was diuresed with IV furosemide and given empiric abx therapy. Echo showed normal LVEF 55-60% with normal wall motion. The RV was moderately dilated with decreased systolic function and mild TR. He underwent a RHC during that stay that showed mild PH (PA pressure of 28 mmHg (51/15), mean RA of 4mmHg, and mildly elevated filling pressures with a wedge of 12mmHg (Vwave 16). PVR was 3.9 Wood units and Carlos Manuel CO/CI was 4.09/2.23. With vasodilator challenge, his PVR normalized to 1.22 wood units. Therefore, he was deemed to have pulmonary hypertension out of proportion to his left heart disease. He was started on Cialis 5mg daily but because this was not a formulated preparation, was switched to sildenafil 20 mg 3 times daily and discharged home. Interestingly, he was not discharged on any diuretics. Discharge wt was 152 lbs. His admission was complicated by atrial flutter for which he was given amiodarone  "but because of prolongation in QTc, this was discontinued, and he was continued on his PTA verapamil.    Following discharge he developed symptomatic hypotension and via phone was told to stop the sildenafil. He then saw Dr. Chad gusman for PH evaluation a few days later. He reported hypertension with SBP >200mmHg and weight was up 12 lbs from discharge. Torsemide 10 mg daily, Losartan 25 mg daily, and KCL 40 mEq were all started. She recommended resuming sildenafil once he was felt to be euvolemic.    - Readmitted from 5/21 to 5/29 with uncontrolled diabetes and recurrent acute HFpEF, related to medication noncompliance. He again required IV lasix and was discharged on Lasix 40mg BID in place of the torsemide. Due to renal concerns, his hydrochlorothiazide and losartan were held. Fortunately, home health was arranged to help him manage his medications at home.   - 7/8-7/10/20 for re-accumulating pleural effusion, at which time IR evaluated his pleurex and was able to place to suction with 550ml fluid removed. He was also diuresed with good result.   - 7/10-7/18/20 for weakness/fall resulting in head contusion. He was found to have an empyema and received antibiotics. His pleurex was removed and a chest tube was placed. CT drained \"nearly all the fluid\" and was removed day prior to discharge.  ____________________________________    Since the admissions in 2020, I've been [place]c closely [place] [facility] and enrolled him in the T system. His weights and symptoms have fluctuated (typically related to diet, uncontrolled blood pressures and self-dosing diuretics), and most recently he had a bit of a prolonged recovery following abdominal hernia repair, but I'm happy to say he hasn't been readmitted with heart failure since the summer. Please see my note dated 12/28/2020 for details.     Most recently, I increased his spironolactone from 25-50 mg daily for uncontrolled hypertension.  He saw Vidhi Galo of " nephrology shortly after that on January 26, and at that time remained hypertensive with a blood pressure of 153/73 mmHg, at a clinic weight of 157 pounds.  Creatinine was noted to be just slightly above his baseline at 1.33.  No changes were made, but iron studies were placed, to be obtained at our next office visit.  Unfortunately, it does not look like those were drawn today.    Today, Tc presents for a routine six week follow up visit. BMP today reveals a creatinine of 1.38, a BUN of 28 and a potassium of 5.2.  The proBNP is stable around 3000, hemoglobin mildly reduced at 11.8, but improved from December.  His glucose is again uncontrolled at 282.  This is a nonfasting lab. However hells me he had his A1c checked with endocrinology last week (note/result not available to me) and his A1c was better at 7.4% (previously >8).   Review of his my health tracker flowsheet shows that over the past 3 weeks, his weight has been stable between 150-155 pounds, which is within his goal weight parameters. Of note, and not surprisingly, he's only taking the furosemide at 40 mg daily instead of as prescribed at 60 mg daily.   He continues to feel really well from a cardiovascular standpoint. Not limited by dyspnea. Denies peripheral edema. States BP's have been significantly better since increasing the [place], 110's-120's typically systolic. Needs a refill on Eliquis. I note that he qualifies now for the standard dose of Eliquis at 5 mg BID.   His only real complaint today is of a flair in his chronic/intermittent back pain, for which he's having a CT scan with [facility] in the near future.   Had his first COVID-19 vaccine last week.     Assessment/Plan:  1. Chronic HFpEF / cor pulmonale                 -- FC II sxs on Lasix 40 daily + Abraham 50 daily.                 -- Enrolled in MHT with a goal range of 150-156 lbs. Maintaining.  2. Pulmonary hypertension. Previously failed sildenafil, but has recently been more stable from a  CHF standpoint.    -- Will place an order for pulmonary rehab, now that he's had his COVID-19 vaccine (had previously requested to postpone this due to risk). May consider re-referral to PHTN clinic.  3. Paroxysmal atrial fibrillation/flutter, rate-controlled, asymptomatic.              -- Was on low-dose apixaban 2.5mg BID due to age and renal function - now qualifies for 5 mg dose. Rx sent.  4. Hypertension -- Now better-controlled.   5. Anemia of chronic disease, stable. Will see if we can add on Fe studies ordered by nephrology to labs drawn today.  6. CKD-III - Creat over the past year has been variable, between 1.3 - 2.0; most recently ~1.2-1.4 x months. Followed by [facility].    -- Discussed relative potassium restricted diet today, given potassium level of 5.2.   -- Labs to be repeated with neph in April.    Follow-up: With me in clinic in 3 months (bring in medication bottles and BP cuff), echocardiogram and labs the day prior.    Ame Mejía PA-C  [facility] - [facility] Clinic  Pager: [phone]  Text Page  (7:30am - 4pm M-F)

## 2021-02-09 NOTE — PROGRESS NOTES
[facility]  My Health Tracker    Telemanagement for review at visit today with FORTUNATO Almeida. Goal wt parameters and PRN diuretic plan to be addressed at today's visit.     Current weight goal: 150-156#    Recent data:   MyHealth Tracker CHF Flowsheet My weight today is:   1/20/2021 155   1/21/2021 155   1/22/2021 153   1/24/2021 153   1/25/2021 153   1/27/2021 152   1/28/2021 152   1/29/2021 152   1/31/2021 151   2/1/2021 152   2/2/2021 152   2/3/2021 151   2/4/2021 151   2/5/2021 150   2/6/2021 150   2/8/2021 152     Dilma Rene RN, BSN, CHFN  02/09/21 at 1:02 PM

## 2021-02-12 ENCOUNTER — CARE COORDINATION (OUTPATIENT)
Dept: CARDIOLOGY | Facility: CLINIC | Age: 81
End: 2021-02-12

## 2021-02-12 NOTE — PROGRESS NOTES
[facility] - [facility] Clinic Telemanagement  My Health Tracker HF Alert     Weight today: 151#    Weight goal: 150-156#  MyHealth Tracker CHF Flowsheet My weight today is:   2/5/2021 150   2/6/2021 150   2/8/2021 152   2/9/2021 153   2/10/2021 150   2/12/2021 151     Heart Failure sx: SOB reported    Daily diuretic plan: Lasix 40mg daily  Spironolactone 50mg daily    Notes: Wt within goal, but SOB reported today. Pt had visit with FORTUNATO Ortiz on 2/9, no changes, pt to follow up in 3 months. Attempted to call pt to review, no answer. LVM for call back to discuss.     No future appointments.      Dilma Rene RN, BSN, CHFN  02/12/21 at 1:16 PM

## 2021-02-18 ENCOUNTER — CARE COORDINATION (OUTPATIENT)
Dept: CARDIOLOGY | Facility: CLINIC | Age: 81
End: 2021-02-18

## 2021-02-18 NOTE — PROGRESS NOTES
"[facility] - [facility]  My Health Tracker HF Alert     Weight today: 152#  Weight goal: 150-156#  MyHealth Tracker CHF Flowsheet My weight today is:   2/1/2021 152   2/2/2021 152   2/3/2021 151   2/4/2021 151   2/5/2021 150   2/6/2021 150   2/8/2021 152   2/9/2021 153   2/10/2021 150   2/12/2021 151   2/14/2021 152   2/16/2021 152   2/18/2021 152     Heart Failure sx: SOB reported    Daily diuretic plan:   Lasix 40mg daily  Spironolactone 50mg daily    Notes: Wt within goal, but pt reporting SOB on survey. Last visit with [facility]TUNAT[facility] Bustos on 2/9, no changes and pt to follow up with [facility] in 3 months. Orders were placed for pulmonary rehab, this has not been scheduled yet.     Called pt, he reports he notices some mornings when he gets up out of bed and gets moving he is a little SOB but then it gets better and he doesn't notice it after that. Denies any SOB at night or during the day, only sometimes when getting up and moving first thing in the am. Pt denies any other HF symptoms. Reviewed Pulmonary rehab orders, asked pt if they had reached out to schedule yet. Pt stated they had, but he has decided not to proceed at this time. He is not comfortable with going given current pandemic. He states he went there and there are too many people close together and it is a \"breading ground for COVID\" and is not comfortable going right now. Told pt I would let FORTUNATO Bustos know he is choosing not to participate in Pulmonary rehab at this time. Asked pt to keep an eye on SOB in am, if getting worse to call, otherwise monitor for now, as weights stable and no other sx. Pt agreeable with plan.       No future appointments.      Dilma Rene, RN, BSN, CHFN  02/18/21 at 10:21 AM   "

## 2021-03-01 ENCOUNTER — CARE COORDINATION (OUTPATIENT)
Dept: CARDIOLOGY | Facility: CLINIC | Age: 81
End: 2021-03-01

## 2021-03-01 NOTE — PROGRESS NOTES
[facility]    Last received My Health Tracker survey from Mr. Ruiz 2/25 at weight 152lbs and dyspnea reported.     I called him to check in. He told me he feels well. His weight today is 152lbs. His /88. He told me he had forgotten to call in his surveys and will resume doing so.    Debi Mills RN BSN   12:11 PM 03/01/21

## 2021-03-11 ENCOUNTER — CARE COORDINATION (OUTPATIENT)
Dept: CARDIOLOGY | Facility: CLINIC | Age: 81
End: 2021-03-11

## 2021-03-11 NOTE — PROGRESS NOTES
[facility] - [facility]  My Health Tracker HF Alert     Survey submitted late on 3/10 and pt reported SOB. No survey submitted today. Called pt, he reports doing well. He has LUCIANO in am when getting dressed for the day, he has had this for a long time and is no more than usual. Pt has no SOB during the day or night and feels well. Weight today 151#, stable and within goal. Will continue to monitor.     Weight goal: 150-156#  MyHealth Tracker CHF Flowsheet My weight today is:   2/19/2021 151   2/24/2021 152   2/25/2021 152   3/1/2021 152   3/2/2021 152   3/3/2021 153   3/4/2021 154   3/5/2021 152   3/7/2021 151   3/8/2021 152   3/9/2021 151   3/10/2021 152       Dilma Rene RN, BSN, CHFN  03/11/21 at 1:55 PM

## 2021-03-17 ENCOUNTER — CARE COORDINATION (OUTPATIENT)
Dept: CARDIOLOGY | Facility: CLINIC | Age: 81
End: 2021-03-17

## 2021-03-17 ENCOUNTER — HOSPITAL ENCOUNTER (OUTPATIENT)
Facility: CLINIC | Age: 81
Setting detail: OBSERVATION
Discharge: HOME | End: 2021-03-19
Attending: EMERGENCY MEDICINE | Admitting: INTERNAL MEDICINE
Payer: COMMERCIAL

## 2021-03-17 ENCOUNTER — APPOINTMENT (OUTPATIENT)
Dept: CT IMAGING | Facility: CLINIC | Age: 81
End: 2021-03-17
Attending: EMERGENCY MEDICINE
Payer: COMMERCIAL

## 2021-03-17 DIAGNOSIS — E86.0 DEHYDRATION: ICD-10-CM

## 2021-03-17 DIAGNOSIS — N17.9 ACUTE KIDNEY INJURY (H): ICD-10-CM

## 2021-03-17 DIAGNOSIS — R55 SYNCOPE AND COLLAPSE: Primary | ICD-10-CM

## 2021-03-17 DIAGNOSIS — I50.33 ACUTE ON CHRONIC HEART FAILURE WITH PRESERVED EJECTION FRACTION (HFPEF) (H): ICD-10-CM

## 2021-03-17 DIAGNOSIS — D62 ANEMIA DUE TO BLOOD LOSS, ACUTE: ICD-10-CM

## 2021-03-17 DIAGNOSIS — Z98.890 POSTOPERATIVE STATE: ICD-10-CM

## 2021-03-17 DIAGNOSIS — R53.1 WEAKNESS: ICD-10-CM

## 2021-03-17 DIAGNOSIS — J90 RECURRENT PLEURAL EFFUSION ON LEFT: ICD-10-CM

## 2021-03-17 LAB
ALBUMIN SERPL-MCNC: 3.9 G/DL (ref 3.4–5)
ALBUMIN UR-MCNC: NEGATIVE MG/DL
ALP SERPL-CCNC: 62 U/L (ref 40–150)
ALT SERPL W P-5'-P-CCNC: 12 U/L (ref 0–70)
ANION GAP SERPL CALCULATED.3IONS-SCNC: 9 MMOL/L (ref 3–14)
APPEARANCE UR: CLEAR
AST SERPL W P-5'-P-CCNC: 10 U/L (ref 0–45)
BASOPHILS # BLD AUTO: 0.1 10E9/L (ref 0–0.2)
BASOPHILS NFR BLD AUTO: 1 %
BILIRUB SERPL-MCNC: 0.8 MG/DL (ref 0.2–1.3)
BILIRUB UR QL STRIP: NEGATIVE
BUN SERPL-MCNC: 46 MG/DL (ref 7–30)
CALCIUM SERPL-MCNC: 9.9 MG/DL (ref 8.5–10.1)
CHLORIDE SERPL-SCNC: 105 MMOL/L (ref 94–109)
CO2 SERPL-SCNC: 26 MMOL/L (ref 20–32)
COLOR UR AUTO: ABNORMAL
CREAT SERPL-MCNC: 1.85 MG/DL (ref 0.66–1.25)
DIFFERENTIAL METHOD BLD: ABNORMAL
EOSINOPHIL # BLD AUTO: 0.3 10E9/L (ref 0–0.7)
EOSINOPHIL NFR BLD AUTO: 2.8 %
ERYTHROCYTE [DISTWIDTH] IN BLOOD BY AUTOMATED COUNT: 15.5 % (ref 10–15)
GFR SERPL CREATININE-BSD FRML MDRD: 33 ML/MIN/{1.73_M2}
GLUCOSE BLDC GLUCOMTR-MCNC: 135 MG/DL (ref 70–99)
GLUCOSE BLDC GLUCOMTR-MCNC: 248 MG/DL (ref 70–99)
GLUCOSE BLDC GLUCOMTR-MCNC: 276 MG/DL (ref 70–99)
GLUCOSE SERPL-MCNC: 158 MG/DL (ref 70–99)
GLUCOSE UR STRIP-MCNC: 500 MG/DL
HCT VFR BLD AUTO: 30.7 % (ref 40–53)
HGB BLD-MCNC: 9.8 G/DL (ref 13.3–17.7)
HGB UR QL STRIP: NEGATIVE
HYALINE CASTS #/AREA URNS LPF: 1 /LPF (ref 0–2)
IMM GRANULOCYTES # BLD: 0.1 10E9/L (ref 0–0.4)
IMM GRANULOCYTES NFR BLD: 0.4 %
INTERPRETATION ECG - MUSE: NORMAL
KETONES UR STRIP-MCNC: 5 MG/DL
LABORATORY COMMENT REPORT: NORMAL
LEUKOCYTE ESTERASE UR QL STRIP: NEGATIVE
LYMPHOCYTES # BLD AUTO: 1.6 10E9/L (ref 0.8–5.3)
LYMPHOCYTES NFR BLD AUTO: 14.6 %
MCH RBC QN AUTO: 27.2 PG (ref 26.5–33)
MCHC RBC AUTO-ENTMCNC: 31.9 G/DL (ref 31.5–36.5)
MCV RBC AUTO: 85 FL (ref 78–100)
MONOCYTES # BLD AUTO: 1.3 10E9/L (ref 0–1.3)
MONOCYTES NFR BLD AUTO: 11.1 %
MUCOUS THREADS #/AREA URNS LPF: PRESENT /LPF
NEUTROPHILS # BLD AUTO: 7.9 10E9/L (ref 1.6–8.3)
NEUTROPHILS NFR BLD AUTO: 70.1 %
NITRATE UR QL: NEGATIVE
NRBC # BLD AUTO: 0 10*3/UL
NRBC BLD AUTO-RTO: 0 /100
PH UR STRIP: 5.5 PH (ref 5–7)
PLATELET # BLD AUTO: 287 10E9/L (ref 150–450)
POTASSIUM SERPL-SCNC: 3.9 MMOL/L (ref 3.4–5.3)
PROT SERPL-MCNC: 6.2 G/DL (ref 6.8–8.8)
RBC # BLD AUTO: 3.6 10E12/L (ref 4.4–5.9)
RBC #/AREA URNS AUTO: 1 /HPF (ref 0–2)
SARS-COV-2 RNA RESP QL NAA+PROBE: NEGATIVE
SODIUM SERPL-SCNC: 140 MMOL/L (ref 133–144)
SOURCE: ABNORMAL
SP GR UR STRIP: 1.02 (ref 1–1.03)
SPECIMEN SOURCE: NORMAL
SQUAMOUS #/AREA URNS AUTO: 0 /HPF (ref 0–1)
TROPONIN I SERPL-MCNC: <0.015 UG/L (ref 0–0.04)
TSH SERPL DL<=0.005 MIU/L-ACNC: 0.94 MU/L (ref 0.4–4)
UROBILINOGEN UR STRIP-MCNC: 0 MG/DL (ref 0–2)
WBC # BLD AUTO: 11.2 10E9/L (ref 4–11)
WBC #/AREA URNS AUTO: 1 /HPF (ref 0–5)

## 2021-03-17 PROCEDURE — 99220 PR INITIAL OBSERVATION CARE,LEVEL III: CPT | Performed by: INTERNAL MEDICINE

## 2021-03-17 PROCEDURE — G0378 HOSPITAL OBSERVATION PER HR: HCPCS

## 2021-03-17 PROCEDURE — 81001 URINALYSIS AUTO W/SCOPE: CPT | Performed by: EMERGENCY MEDICINE

## 2021-03-17 PROCEDURE — 36415 COLL VENOUS BLD VENIPUNCTURE: CPT | Performed by: PHYSICIAN ASSISTANT

## 2021-03-17 PROCEDURE — 250N000012 HC RX MED GY IP 250 OP 636 PS 637: Performed by: PHYSICIAN ASSISTANT

## 2021-03-17 PROCEDURE — 93005 ELECTROCARDIOGRAM TRACING: CPT

## 2021-03-17 PROCEDURE — 80053 COMPREHEN METABOLIC PANEL: CPT | Performed by: EMERGENCY MEDICINE

## 2021-03-17 PROCEDURE — 85025 COMPLETE CBC W/AUTO DIFF WBC: CPT | Performed by: EMERGENCY MEDICINE

## 2021-03-17 PROCEDURE — 84443 ASSAY THYROID STIM HORMONE: CPT | Performed by: EMERGENCY MEDICINE

## 2021-03-17 PROCEDURE — 258N000003 HC RX IP 258 OP 636: Performed by: PHYSICIAN ASSISTANT

## 2021-03-17 PROCEDURE — 99285 EMERGENCY DEPT VISIT HI MDM: CPT | Mod: 25

## 2021-03-17 PROCEDURE — 96372 THER/PROPH/DIAG INJ SC/IM: CPT

## 2021-03-17 PROCEDURE — 250N000013 HC RX MED GY IP 250 OP 250 PS 637: Performed by: PHYSICIAN ASSISTANT

## 2021-03-17 PROCEDURE — 999N001017 HC STATISTIC GLUCOSE BY METER IP

## 2021-03-17 PROCEDURE — 258N000003 HC RX IP 258 OP 636: Performed by: EMERGENCY MEDICINE

## 2021-03-17 PROCEDURE — 70450 CT HEAD/BRAIN W/O DYE: CPT

## 2021-03-17 PROCEDURE — C9803 HOPD COVID-19 SPEC COLLECT: HCPCS

## 2021-03-17 PROCEDURE — 96360 HYDRATION IV INFUSION INIT: CPT

## 2021-03-17 PROCEDURE — 84484 ASSAY OF TROPONIN QUANT: CPT | Performed by: EMERGENCY MEDICINE

## 2021-03-17 PROCEDURE — 87635 SARS-COV-2 COVID-19 AMP PRB: CPT | Performed by: EMERGENCY MEDICINE

## 2021-03-17 RX ORDER — NITROGLYCERIN 0.4 MG/1
0.4 TABLET SUBLINGUAL EVERY 5 MIN PRN
Status: DISCONTINUED | OUTPATIENT
Start: 2021-03-17 | End: 2021-03-19 | Stop reason: HOSPADM

## 2021-03-17 RX ORDER — LIDOCAINE 40 MG/G
CREAM TOPICAL
Status: DISCONTINUED | OUTPATIENT
Start: 2021-03-17 | End: 2021-03-19 | Stop reason: HOSPADM

## 2021-03-17 RX ORDER — ACETAMINOPHEN 325 MG/1
650 TABLET ORAL EVERY 4 HOURS PRN
Status: DISCONTINUED | OUTPATIENT
Start: 2021-03-17 | End: 2021-03-19 | Stop reason: HOSPADM

## 2021-03-17 RX ORDER — ACETAMINOPHEN 650 MG/1
650 SUPPOSITORY RECTAL EVERY 4 HOURS PRN
Status: DISCONTINUED | OUTPATIENT
Start: 2021-03-17 | End: 2021-03-19 | Stop reason: HOSPADM

## 2021-03-17 RX ORDER — ONDANSETRON 4 MG/1
4 TABLET, ORALLY DISINTEGRATING ORAL EVERY 6 HOURS PRN
Status: DISCONTINUED | OUTPATIENT
Start: 2021-03-17 | End: 2021-03-19 | Stop reason: HOSPADM

## 2021-03-17 RX ORDER — CARVEDILOL 3.12 MG/1
3.12 TABLET ORAL 2 TIMES DAILY WITH MEALS
Status: DISCONTINUED | OUTPATIENT
Start: 2021-03-17 | End: 2021-03-19 | Stop reason: HOSPADM

## 2021-03-17 RX ORDER — PROCHLORPERAZINE MALEATE 5 MG
5 TABLET ORAL EVERY 6 HOURS PRN
Status: DISCONTINUED | OUTPATIENT
Start: 2021-03-17 | End: 2021-03-19 | Stop reason: HOSPADM

## 2021-03-17 RX ORDER — HYDROCODONE BITARTRATE AND ACETAMINOPHEN 5; 325 MG/1; MG/1
1 TABLET ORAL EVERY 6 HOURS PRN
COMMUNITY
End: 2023-01-15

## 2021-03-17 RX ORDER — NICOTINE POLACRILEX 4 MG
15-30 LOZENGE BUCCAL
Status: DISCONTINUED | OUTPATIENT
Start: 2021-03-17 | End: 2021-03-19 | Stop reason: HOSPADM

## 2021-03-17 RX ORDER — PROCHLORPERAZINE 25 MG
12.5 SUPPOSITORY, RECTAL RECTAL EVERY 12 HOURS PRN
Status: DISCONTINUED | OUTPATIENT
Start: 2021-03-17 | End: 2021-03-19 | Stop reason: HOSPADM

## 2021-03-17 RX ORDER — ONDANSETRON 2 MG/ML
4 INJECTION INTRAMUSCULAR; INTRAVENOUS EVERY 6 HOURS PRN
Status: DISCONTINUED | OUTPATIENT
Start: 2021-03-17 | End: 2021-03-19 | Stop reason: HOSPADM

## 2021-03-17 RX ORDER — SODIUM CHLORIDE 9 MG/ML
INJECTION, SOLUTION INTRAVENOUS CONTINUOUS
Status: DISCONTINUED | OUTPATIENT
Start: 2021-03-17 | End: 2021-03-18

## 2021-03-17 RX ORDER — DEXTROSE MONOHYDRATE 25 G/50ML
25-50 INJECTION, SOLUTION INTRAVENOUS
Status: DISCONTINUED | OUTPATIENT
Start: 2021-03-17 | End: 2021-03-19 | Stop reason: HOSPADM

## 2021-03-17 RX ORDER — VERAPAMIL HYDROCHLORIDE 120 MG/1
120 TABLET, FILM COATED, EXTENDED RELEASE ORAL AT BEDTIME
Status: DISCONTINUED | OUTPATIENT
Start: 2021-03-17 | End: 2021-03-19 | Stop reason: HOSPADM

## 2021-03-17 RX ADMIN — CARVEDILOL 3.12 MG: 3.12 TABLET, FILM COATED ORAL at 17:46

## 2021-03-17 RX ADMIN — INSULIN ASPART: 100 INJECTION, SOLUTION INTRAVENOUS; SUBCUTANEOUS at 15:51

## 2021-03-17 RX ADMIN — SODIUM CHLORIDE, PRESERVATIVE FREE: 5 INJECTION INTRAVENOUS at 13:22

## 2021-03-17 RX ADMIN — SODIUM CHLORIDE 1000 ML: 9 INJECTION, SOLUTION INTRAVENOUS at 08:19

## 2021-03-17 RX ADMIN — SODIUM CHLORIDE, PRESERVATIVE FREE: 5 INJECTION INTRAVENOUS at 22:34

## 2021-03-17 RX ADMIN — VERAPAMIL HYDROCHLORIDE 120 MG: 120 TABLET, FILM COATED, EXTENDED RELEASE ORAL at 21:20

## 2021-03-17 RX ADMIN — APIXABAN 5 MG: 5 TABLET, FILM COATED ORAL at 21:20

## 2021-03-17 ASSESSMENT — ENCOUNTER SYMPTOMS
VOMITING: 0
BACK PAIN: 0
FEVER: 0
WEAKNESS: 1
DIARRHEA: 0
ANAL BLEEDING: 0
HEADACHES: 0
NAUSEA: 0
CONSTIPATION: 0
ARTHRALGIAS: 0
NECK PAIN: 0
ABDOMINAL PAIN: 0
NUMBNESS: 0

## 2021-03-17 ASSESSMENT — MIFFLIN-ST. JEOR: SCORE: 1325.42

## 2021-03-17 NOTE — ED NOTES
[facility]  ED Nurse Handoff Report    ED Chief complaint: Fall and Head Injury      ED Diagnosis:   Final diagnoses:   Syncope and collapse   Weakness   Acute kidney injury (H)   Postoperative state       Code Status: DNI, no CPR    Allergies:   Allergies   Allergen Reactions     No Known Drug Allergies        Patient Story: Increased weakness past 9 days due to teeth pulled for dentures, diet has been poor due to teeth, blood sugar more uncontrolled. Type 2 DM. This AM became weak while walking into the kitchen, fell and hit head on fridge on the way down. No LOC, is on thinners for Hx of A-fib. Denies neck pain.    Focused Assessment:  A/O x4, very pleasant, up independently but BP drops a fair bit when he stands.  Labs Ordered and Resulted from Time of ED Arrival Up to the Time of Departure from the ED   CBC WITH PLATELETS DIFFERENTIAL - Abnormal; Notable for the following components:       Result Value    WBC 11.2 (*)     RBC Count 3.60 (*)     Hemoglobin 9.8 (*)     Hematocrit 30.7 (*)     RDW 15.5 (*)     All other components within normal limits   COMPREHENSIVE METABOLIC PANEL - Abnormal; Notable for the following components:    Glucose 158 (*)     Urea Nitrogen 46 (*)     Creatinine 1.85 (*)     GFR Estimate 33 (*)     GFR Estimate If Black 38 (*)     Protein Total 6.2 (*)     All other components within normal limits   TROPONIN I   TSH WITH FREE T4 REFLEX   ROUTINE UA WITH MICROSCOPIC   SARS-COV-2 (COVID-19) VIRUS RT-PCR   CARDIAC CONTINUOUS MONITORING   ORTHOSTATIC BLOOD PRESSURE AND PULSE     CT Head w/o Contrast   Preliminary Result   IMPRESSION:  Diffuse cerebral volume loss and cerebral white matter   changes consistent with chronic small vessel ischemic disease. No   evidence for acute intracranial pathology.         Radiation dose for this scan was reduced using automated exposure   control, adjustment of the mA and/or kV according to patient size, or   iterative reconstruction  "technique.          Treatments and/or interventions provided:   Medications   0.9% sodium chloride BOLUS (0 mLs Intravenous Stopped 3/17/21 0847)       Patient's response to treatments and/or interventions: resting comfortably    To be done/followed up on inpatient unit:  see any in-patient orders    Does this patient have any cognitive concerns?: NA    Activity level - Baseline/Home:  Independent  Activity Level - Current:   Stand with Assist    Patient's Preferred language: English   Needed?: No    Isolation: None  Infection: Not Applicable  Patient tested for COVID 19 prior to admission: YES  Bariatric?: No    Vital Signs:   Vitals:    03/17/21 0746 03/17/21 0800 03/17/21 0900   BP: 118/77 (!) 88/60 117/75   Pulse: 75     Resp: 16     Temp: 97.9  F (36.6  C)     TempSrc: Oral     SpO2: 99% 100% 100%   Weight: 66.7 kg (147 lb)     Height: 1.702 m (5' 7\")         Cardiac Rhythm:     Was the PSS-3 completed:   Yes  What interventions are required if any?               Family Comments: none present  OBS brochure/video discussed/provided to patient/family: Yes              Name of person given brochure if not patient:               Relationship to patient:     For the majority of the shift this patient's behavior was Green.   Behavioral interventions performed were.    ED NURSE PHONE NUMBER: *[phone]         "

## 2021-03-17 NOTE — PHARMACY-ADMISSION MEDICATION HISTORY
Pharmacy Medication History  Admission medication history interview status for the 3/17/2021  admission is complete. See EPIC admission navigator for prior to admission medications     Location of Interview: Phone  Medication history sources: Patient    Significant changes made to the medication list:  Not taking lantus anymore.    In the past week, patient estimated taking medication this percent of the time: greater than 90%    Additional medication history information:   Added norco    Medication reconciliation completed by provider prior to medication history? No    Time spent in this activity: 20min    Prior to Admission medications    Medication Sig Last Dose Taking? Auth Provider   apixaban ANTICOAGULANT (ELIQUIS) 5 MG tablet Take 1 tablet (5 mg) by mouth 2 times daily 3/16/2021 at pm Yes Ame Mejía PA-C   carvedilol (COREG) 6.25 MG tablet Take 0.5 tablets (3.125 mg) by mouth 2 times daily (with meals) 3/16/2021 at pm Yes Ame Mejía PA-C   furosemide (LASIX) 40 MG tablet Take 1 tablet (40 mg) by mouth daily 3/15/2021 at am Yes Ame Mejía PA-C   HYDROcodone-acetaminophen (NORCO) 5-325 MG tablet Take 1 tablet by mouth every 6 hours as needed for severe pain prn Yes Unknown, Entered By History   insulin aspart (NOVOLOG PEN) 100 UNIT/ML pen Inject 8 units subcutaneous with breakfast, 8 units subcutaneous with lunch and 4 units with supper. 3/17/2021 at am Yes Anny Beach MD   losartan (COZAAR) 50 MG tablet Take 1 tablet (50 mg) by mouth 2 times daily 3/16/2021 at Unknown time Yes Cony Julien,    LOVASTATIN 20 MG PO TABS 1 TABLET DAILY AT DINNER 3/16/2021 at pm Yes Tc Palacios MD   nystatin (MYCOSTATIN) 640394 UNIT/GM external cream Apply topically 2 times daily as needed   Yes Unknown, Entered By History   spironolactone (ALDACTONE) 25 MG tablet Take 2 tablets (50 mg) by mouth daily 3/16/2021 at Unknown time Yes Ame Mejía PA-C   verapamil ER  (VERELAN) 120 MG 24 hr capsule Take 1 capsule (120 mg) by mouth At Bedtime 3/16/2021 at hs Yes Ame Mejía PA-C   glucagon 1 MG kit 1 mg as needed for low blood sugar   Unknown, Entered By History   insulin aspart (NovoLOG) inject - to fill ambulatory pump by Device route See Admin Instructions   Reported, Patient       The information provided in this note is only as accurate as the sources available at the time of update(s)

## 2021-03-17 NOTE — ED TRIAGE NOTES
Increased weakness past 9 days due to teeth pulled for dentures, diet has been poor due to teeth, blood sugar more uncontrolled. Type 2 DM. This AM became weak while walking into the kitchen, fell and hit head on fridge on the way down. No LOC, is on thinners for Hx of A-fib. Denies neck pain.

## 2021-03-17 NOTE — PROGRESS NOTES
RECEIVING UNIT ED HANDOFF REVIEW    ED Nurse Handoff Report was reviewed by: Valentina Middleton RN on March 17, 2021 at 11:58 AM

## 2021-03-17 NOTE — PROGRESS NOTES
[facility]  Noted that pt has been calling in survey late in the day, so has not alerted during the day. Weight below goal last few days and he reported SOB yesterday. Pt has not called in survey today. Called pt to review, no answer. LVM for call back to discuss.     Reviewed chart, pt admitted today for fall. Pt had recent tooth extraction 3/8 (9 upper teeth extracted and being fitted for dentures). Per notes, pt with poor PO intake related to pain and post-procedural bleeding. Per notes, pt got up from chair this am, got dizzy and had syncopal event. Head hit fridge. Creatinine 1.85 on admission, up from baseline of 1.2-1.3. PTA Lasix, Losartan, and Spironolactone held on admission. Pt given fluids. Plan for discharge back to home tomorrow back home once hydrated with IVF overnight and creatinine downtrending.    Pt not due for [facility] follow up until May. Will follow admission.     Current weight goal: 150-156#  MyHealth Tracker CHF Flowsheet My weight today is:   3/7/2021 151   3/8/2021 152   3/9/2021 151   3/10/2021 152   3/11/2021 151   3/12/2021 150   3/14/2021 145   3/15/2021 146   3/16/2021 147     Dilma Rene RN, BSN, CHFN  03/17/21 at 1:15 PM

## 2021-03-17 NOTE — PROGRESS NOTES
Paged by bedside RN with question if insulin pump can be changed to subcutaneous insulin instead as there was concern that our hospital insulin would not fit into his pump, but has not been trialed in pump yet. Spoke with charge RN and asked that this logistical issue be investigated first as would ideally want to keep patient on his pump with his PTA settings. RN to notify me if our hospital insulin is not compatible with pump STAT so that orders can be adjusted.

## 2021-03-17 NOTE — PLAN OF CARE
PT: Orders received. Chart reviewed. Hold PT eval for today to allow for medical management as pt just arrived to the unit late this AM.

## 2021-03-17 NOTE — ED NOTES
Bed: ED26  Expected date: 3/17/21  Expected time: 7:22 AM  Means of arrival:   Comments:  Sai 513: 81M weakness x days, fall today

## 2021-03-17 NOTE — PLAN OF CARE
AO. VSS on RA. Tele: SR. SUTTON. Mech/soft mod carb diet: tolerating. Crackles in post RLL lung: denies SOB/cough. BG check: pt has insulin infusion pump. +orthos: no dizziness reported. Denies pain. NS @ 100 ml/hr. PT consult pending. SW consult pending. Discharge pending.

## 2021-03-17 NOTE — ED PROVIDER NOTES
History   Chief Complaint:  Generalized Weakness and Fall    HPI   Tc Garcia is a 81 year old male, anticoagulated on Eliquis with a history of paroxysmal atrial fibrillation, CKD III, CHF, DM II, pulmonary hypertension, and anemia, who presents to the ED for evaluation of generalized weakness and fall. EMS reports the patient had his upper teeth pulled about nine days ago due to denture fitting. They state the patient's diet has shifted because he has been dealing with pain, slight swelling, and bleeding of the upper gums. Paramedics note the patient has been eating more sugary foods, noodles, and juice products as these he can tolerate, which then have been elevating his sugar. Over the past week, with his new diet and change in life, he has been feeling weaker and had a fall today when walking in the kitchen. As he fell, he struck the side of his head on the fridge and slid to the floor. He was unable to get up and then EMS was called. When they arrived on scene, the patient's blood glucose was 183 and this is the patient's baseline. Here in the emergency department, the patient states he has been feeling more weak as he does not believe he is eating the right foods. He conveys he has chronic back pain, but has not had any new back pain. The patient endorses some dizziness when he stands up, but also states he does not feel that he is well hydrated. The patient denies any neck pain, arthralgias, or headache. He denies any focal numbness. He has had a couple normal bowel movements since he had his teeth pulled. He has not had any diarrhea, bloody stool, or abdominal pain. He has not had any nausea, vomiting, or fever.     Review of Systems   Constitutional: Negative for fever.   Gastrointestinal: Negative for abdominal pain, anal bleeding, constipation, diarrhea, nausea and vomiting.   Musculoskeletal: Negative for arthralgias, back pain and neck pain.   Neurological: Positive for weakness. Negative for  "numbness and headaches.   All other systems reviewed and are negative.    Allergies:  No known drug allergies    Medications:    Albuterol  Eliquis  Coreg  Lasix  Glucagon  Insulin aspart  Insuline glargine  Cozaar  Lovastatin  Senna-docusate  Aldactone  Verapamil    Past Medical History:    Anemia  CKD III  Hyperlipidemia  Paroxysmal atrial fibrillation  Pulmonary hypertension  DM II  Hypertension  CHF   Alcohol dependence  Cholecystitis  Hemothorax    Past Surgical History:    Tonsillectomy  Adenoidectomy  IR chest tube placement and removal x2  Cholecystectomy  Bilateral herniorrhaphy inguinal    Family History:    Diabetes  Heart disease    Social History:  PCP: Jessika Cordoba  Presents to the ED via EMS    Physical Exam     Patient Vitals for the past 24 hrs:   BP Temp Temp src Pulse Resp SpO2 Height Weight   03/17/21 1205 -- -- -- -- -- 99 % -- --   03/17/21 1200 -- -- -- -- -- 100 % -- --   03/17/21 0900 117/75 -- -- -- -- 100 % -- --   03/17/21 0800 (!) 88/60 -- -- -- -- 100 % -- --   03/17/21 0746 118/77 97.9  F (36.6  C) Oral 75 16 99 % 1.702 m (5' 7\") 66.7 kg (147 lb)     Physical Exam  General: Alert, appears well-developed and well-nourished. Cooperative.     In mild distress  HEENT:  Head:  Atraumatic  Ears:  External ears are normal  Mouth/Throat:  Oropharynx is without erythema or exudate and mucous membranes are moist.   Eyes:   Conjunctivae normal and EOM are normal. No scleral icterus.  CV:  Normal rate, regular rhythm, normal heart sounds and radial pulses are 2+ and symmetric.  Diastolic murmur.  Resp:  Breath sounds are clear bilaterally    Non-labored, no retractions or accessory muscle use  GI:  Abdomen is soft, no distension, no tenderness. No rebound or guarding.  No CVA tenderness bilaterally  MS:  Normal range of motion. No edema.    Back atraumatic.    No midline cervical, thoracic, or lumbar tenderness  Skin:  Warm and dry.  No rash or lesions noted.  Neuro: Alert. Globally " weak.  GCS: 15  Psych:  Normal mood and affect.    Emergency Department Course   ECG (07:57:49):  Rate 66 bpm. KY interval *. QRS duration 100. QT/QTc 416/436. P-R-T axes * 28 -21. Atrial fibrillation. Abnormal ECG. No significant change compared to EKG on 7/10/20.  Interpreted at 0805 by Alejo Fernandez MD.    Imaging:  CT Head without contrast:  Diffuse cerebral volume loss and cerebral white matter   changes consistent with chronic small vessel ischemic disease. No   evidence for acute intracranial pathology.     Imaging independently reviewed and agree with radiologist interpretation.     Laboratory:  CBC: WBC 11.2 (H), HGB 9.8 (L), o/w WNL ()    CMP:  (H), BUN 46 (H), Creatinine 1.85 (H), GFR 33 (L), Protein Total 6.2 (L), o/w WNL    Troponin (Collected 0752): <0.015    TSH with free T4 reflex: 0.94    UA: , Ketone 5, Mucous Present    COVID-19 Virus PCR: Negative    Emergency Department Course:    Reviewed:  I reviewed the patient's nursing notes, vitals, past available medical records.     Assessments:  0740: I obtained history and examined the patient as noted above.     0900: I rechecked the patient and explained findings.     Consults:   0857: I discussed the patient with the admitting hospitalist, Dr. Fernandez     Interventions:  0819: NS 1L IV Bolus     Disposition:  The patient was admitted to the hospital under the care of Dr. Fernandez.    Impression & Plan    Medical Decision Making:  Patient is an 81-year-old male who presents after a fall in the kitchen this morning after feeling increasingly weak.  Patient has been suffering with continued bleeding and oozing from his upper gums after having multiple tooth extractions with placement of dentures in the last 10 days.  Today he has a mild acute kidney injury and an acute on chronic anemia with a hemoglobin of 9.8.  He is on Eliquis for history of atrial fibrillation.  He is quite orthostatic here in the emergency department but feels fine  when lying flat.  CT scan reassuringly shows no signs of acute intracranial injury.  EKG shows no ischemia.  No changes in comparison with prior EKG.  Trop undetectable, lower concern for ACS.  Patient will be admitted to observation for further monitoring of syncope and fluid resuscitation. I discussed case with Dr. Fernandez who agreed to admission.    Covid-19  Tc Garcia was evaluated during a global COVID-19 pandemic, which necessitated consideration that the patient might be at risk for infection with the SARS-CoV-2 virus that causes COVID-19.   Applicable protocols for evaluation were followed during the patient's care.   COVID-19 was considered as part of the patient's evaluation. The plan for testing is:  a test was obtained during this visit.    Diagnosis:    ICD-10-CM    1. Syncope and collapse  R55 UA with Microscopic     Asymptomatic SARS-CoV-2 COVID-19 Virus (Coronavirus) by PCR   2. Weakness  R53.1    3. Acute kidney injury (H)  N17.9    4. Postoperative state  Z98.890    5. Anemia due to blood loss, acute  D62    6. Dehydration  E86.0        Scribe Disclosure:  Vicente HALEY, am serving as a scribe at 7:40 AM on 3/17/2021 to document services personally performed by Alejo Fernandez MD based on my observations and the provider's statements to me.      Alejo Fernandez MD  03/17/21 5024

## 2021-03-17 NOTE — H&P
[facility]    History and Physical - Hospitalist Service       Date of Admission:  3/17/2021    Assessment & Plan   Tc Garcia is a 81 year old male with PMHx of paroxysmal atrial fibrillation on Eliquis, CKD III, HFpEF, T2DM utilizing insulin pump, pulmonary hypertension, chronic normocytic anemia, and chronic SOB related to hx of recurrent pleural effusion and prior empyema (7/2020) as well as recent dental extraction of 9 teeth (3/8/21)  admitted on 3/17/2021 for generalized weakness and syncope, found to have orthostatic hypotension with ABBIE, likely in the context of dehydration related to dietary changes from tooth extraction. Registered under observation status for further evaluation and treatment.     Generalized weakness with syncopal event   Orthostatic hypotension: Recent tooth extraction 3/8/21 (9 upper teeth extracted, pt being fitted for dentures) with diet alteration to dental soft, but overall poor PO intake related to pain and post-procedural bleeding. For the past 3 days, reports dizziness with positional changes. This AM, got up from his recliner to get juice, felt profoundly dizzy and had a subsequent syncopal event. He hit his head on the fridge. EMS was called. Glucose 183. Noted to have positive orthostatics in the ED. Creatinine 1.85 (baseline 1.2-1.3), Hgb 9.8 (down from 11.8 on 2/9/21), mild leukocytosis (likely reactive); remainder of CBC, BMP unremarkable. TSH and trop negative. Received 1 L bolus in the ED. Has been holding Lasix for the past 2 days preemptively given poor PO intake, continuing to take all other medications. CT head negative for bleed. EKG with a fib with CVR.   - [place] to observation   - Telemetry   - IVF with 0.9% NS at 100 ccs/hr   - Monitor orthostatics qshift, discontinue checks when normal   - PT consulted  -  for discharge planning   - Continue with dental soft diet     ABBIE on CKD III: Baseline creatinine 1.2-1.3. Creatinine on  admission 1.85. Received 1 L bolus in the ED.   - Maintenance fluids as above   - Hold PTA Lasix, Spironolactone, Losartan   - BMP in the AM     Acute on chronic normocytic anemia: Baseline Hgb ranging from 9-11 over the past year. Hgb 11.8 on 2/9/21. Pt reports intermittent bleeding from tooth extraction sites since procedure on 3/8; had held Eliquis 3 days prior to procedure and restarted 24 hours after.   - Monitor, recheck Hgb at 12  - Consent signed for blood transfusion in the event pt's Hgb drops further   - Continue Eliquis     T2DM utilizing insulin pump: Pt reports glucose readings have been stable, although with his limited diet, he has been eating and drinking more sugary foods/beverages. Glucose got up to 600 the evening prior to admission. Note one episode of DKA 5/2020 due to lack of insulin in pt's pump, otherwise no acute issues. Reports hypoglycemia every once in a while.   - Pt is appropriate to use his pump  - Form filled out with pump settings and orders placed in chart   - BS per protocol   - Monitor for hypoglycemia     Recent Labs   Lab 03/17/21  0752   *     Paroxysmal atrial fibrillation, rate controlled  Chronic anticoagulation use: No focal neuro deficits on exam. Head CT negative. Last echo from 5/10/2020 with EF 55-60%, no RWMA, moderate pulmonary hypertension.   - Continue PTA Eliquis   - Continue Coreg and Verapamil once verified     HFpEF, not in acute exacerbation  Pulmonary hypertension: Pt appears dry on exam  - Hold PTA Lasix 40 mg po every day, Losartan 50 mg BID, and Spironolactone 50 mg daily given ABBIE above   - I&Os, daily weights     Dyslipidemia: Resume statin at discharge given observation status     Chronic SOB related to prior hx of recurrent left pleural effusion and development of empyema s/p chest tube placement and appropriate tx with prolonged IV antibiotics. At one point pt had Pleurx catheter for recurrent effusion. Stable. Not hypoxic. No new SOB or URI sx  to warrant CXR at this time.      Diet:  Dental soft/Mod CHO  DVT Prophylaxis: Eliquis  Pruett Catheter: not present  Code Status:   DNR/DNI, confirmed at bedside.        Disposition Plan   Expected discharge: Tomorrow, recommended to prior living arrangement once adequately hydrated with IVF overnight, creatinine downtrending and Hgb stable. .  Entered: Ronda Blank PA-C 03/17/2021, 10:00 AM     The patient's care was discussed with the Attending Physician, Dr. JELLY Fernandez , Bedside Nurse and Patient.    Ronda Blank PA-C  [facility]  Contact information available via [facility] Paging/Directory  ______________________________________________________________________    Chief Complaint   Weakness, dizziness    History is obtained from the patient    History of Present Illness   Tc Garcia is a 81 year old male with PMHx of paroxysmal atrial fibrillation on Eliquis, CKD III, HFpEF, T2DM utilizing insulin pump, pulmonary hypertension, chronic normocytic anemia, and chronic SOB related to hx of recurrent pleural effusion and prior empyema (7/2020) as well as recent dental extraction of 9 teeth (3/8/21)  admitted on 3/17/2021 for generalized weakness and syncope, found to have orthostatic hypotension with ABBIE, likely in the context of dehydration related to dietary changes from tooth extraction. Registered under observation status for further evaluation and treatment.     Recent tooth extraction 3/8/21 (9 upper teeth extracted, pt being fitted for dentures) with diet alteration to dental soft, but overall poor PO intake related to pain and post-procedural bleeding. For the past 3 days, reports dizziness with positional changes. This AM, got up from his recliner to get juice, felt profoundly dizzy and had a subsequent syncopal event. He hit his head on the fridge.     EMS was called. Glucose 183. Seen by Dr. Alejo Fernandez in the ED. Noted to have positive  orthostatics in the ED. Creatinine 1.85 (baseline 1.2-1.3), Hgb 9.8 (down from 11.8 on 2/9/21), mild leukocytosis (likely reactive); remainder of CBC, BMP unremarkable. TSH and trop negative. Received 1 L bolus in the ED. Has been holding Lasix for the past 2 days preemptively given poor PO intake, continuing to take all other medications. CT head negative for bleed. EKG with a fib with CVR.     On interview, pt is resting comfortably in bed, confirms the above history. Vitals are stable. He denies chest pain, palpitations, URI sx, dysuria, N/V/diarrhea. Reports weight went from 150>145>147 lbs prompting him to hold off on Lasix for the past few days. Has been compliant with all other medications with last dose the evening prior.     Review of Systems    The 10 point Review of Systems is negative other than noted in the HPI.    Past Medical History    I have reviewed this patient's medical history and updated it with pertinent information if needed.   Past Medical History:   Diagnosis Date     Anemia      Anemia of chronic disease 5/14/2020     Back pain      CKD (chronic kidney disease) stage 3, GFR 30-59 ml/min      CRF (chronic renal failure), stage 3  5/14/2020     Fall 11/2019    suffered multiple left rib fractures, C3 and T2 fractures     Mixed hyperlipidemia 7/7/2004     Paroxysmal atrial fibrillation (H)      Personal history of colonic polyps 1972    gets colonoscopy every five years, due in 2006     Pleural effusion on left 11/2019    after multiple rib fractures     Pulmonary hypertension (H) 5/10/2020    Added automatically from request for surgery [id number]     Recurrent Left Pleural effusion -- S/P Pleurex Cath 5/12/20 12/30/2019     Rosacea 1989     Type II or unspecified type diabetes mellitus without mention of complication, not stated as uncontrolled 1999     Unspecified essential hypertension 1994     Past Surgical History   I have reviewed this patient's surgical history and updated it with  pertinent information if needed.  Past Surgical History:   Procedure Laterality Date     ANESTHESIA CARDIOVERSION N/A 2/3/2020    Procedure: ANESTHESIA, FOR CARDIOVERSION;  Surgeon: GENERIC ANESTHESIA PROVIDER;  Location:  OR      RIGHT HEART CATH MEASUREMENTS RECORDED N/A 2020    Procedure: Right Heart Cath;  Surgeon: Senthil Silva MD;  Location:  HEART CARDIAC CATH LAB     HC REMOVE TONSILS/ADENOIDS,<11 Y/O      T & A <11y.o.     IR CHEST TUBE DRAIN TUNNELED LEFT  2020     IR CHEST TUBE PLACEMENT NON-TUNNELLED LEFT  2020     IR CHEST TUBE REMOVAL NON TUNNELED LEFT  2020     IR CHEST TUBE REMOVAL TUNNELED LEFT  2020     LAPAROSCOPIC CHOLECYSTECTOMY N/A 2020    Procedure: CHOLECYSTECTOMY, LAPAROSCOPIC;  Surgeon: Annette Lambert MD;  Location:  OR     LAPAROSCOPIC HERNIORRHAPHY INGUINAL BILATERAL Bilateral 10/27/2020    Procedure: LAPAROSCOPIC BILATERAL INGUINAL HERNIA REPAIR WITH MESH;  Surgeon: Brian Garsia MD;  Location:  OR       Social History   I have reviewed this patient's social history and updated it with pertinent information if needed.  Social History     Tobacco Use     Smoking status: Former Smoker     Packs/day: 0.20     Years: 40.00     Pack years: 8.00     Types: Cigarettes     Start date:      Quit date: 2008     Years since quittin.2     Smokeless tobacco: Never Used     Tobacco comment: occasional   Substance Use Topics     Alcohol use: Not Currently     Alcohol/week: 35.0 standard drinks     Frequency: Never     Drug use: Not Currently     Lives at home with wifeRadha. Independent of ADLs.     Family History   I have reviewed this patient's family history and updated it with pertinent information if needed.  Family History   Problem Relation Age of Onset     Family History Negative Mother          age 71     Family History Negative Father          age 70     Diabetes Brother         alive age 77      Diabetes Brother         alive age 76     Family History Negative Brother              Family History Negative Brother              Diabetes Sister         alive age74     Family History Negative Sister          age 86     Heart Disease Sister              Family History Negative Sister              Family History Negative Sister              Diabetes Sister      Diabetes Sister      Diabetes Brother      Diabetes Brother        Prior to Admission Medications   Prior to Admission Medications   Prescriptions Last Dose Informant Patient Reported? Taking?   LOVASTATIN 20 MG PO TABS  Self No No   Si TABLET DAILY AT DINNER   albuterol (PROAIR HFA/PROVENTIL HFA/VENTOLIN HFA) 108 (90 Base) MCG/ACT inhaler  Self Yes No   Sig: Inhale 2 puffs into the lungs every 4 hours as needed for shortness of breath / dyspnea or wheezing Inhale 2 puffs every 4 to 6 hours as needed.   apixaban ANTICOAGULANT (ELIQUIS) 5 MG tablet   No No   Sig: Take 1 tablet (5 mg) by mouth 2 times daily   carvedilol (COREG) 6.25 MG tablet   Yes No   Sig: Take 0.5 tablets (3.125 mg) by mouth 2 times daily (with meals)   furosemide (LASIX) 40 MG tablet   Yes No   Sig: Take 1 tablet (40 mg) by mouth daily   glucagon 1 MG kit  Self Yes No   Si mg as needed for low blood sugar   insulin aspart (NOVOLOG PEN) 100 UNIT/ML pen  Self No No   Sig: Inject 8 units subcutaneous with breakfast, 8 units subcutaneous with lunch and 4 units with supper.   insulin aspart (NovoLOG) inject - to fill ambulatory pump   Yes No   Sig: by Device route See Admin Instructions   insulin glargine (LANTUS PEN) 100 UNIT/ML pen  Self Yes No   Sig: Inject 14 Units Subcutaneous every morning   losartan (COZAAR) 50 MG tablet   No No   Sig: Take 1 tablet (50 mg) by mouth 2 times daily   nystatin (MYCOSTATIN) 341182 UNIT/GM external cream  Self Yes No   Sig: Apply topically 2 times daily as needed    oxyCODONE (ROXICODONE) 5 MG tablet    No No   Sig: Take 1 tablet (5 mg) by mouth every 4 hours as needed for moderate to severe pain   senna-docusate (SENOKOT-S/PERICOLACE) 8.6-50 MG tablet   No No   Sig: Take 1-2 tablets by mouth 2 times daily as needed for constipation   spironolactone (ALDACTONE) 25 MG tablet   No No   Sig: Take 2 tablets (50 mg) by mouth daily   verapamil ER (VERELAN) 120 MG 24 hr capsule   No No   Sig: Take 1 capsule (120 mg) by mouth At Bedtime      Facility-Administered Medications: None     Allergies   Allergies   Allergen Reactions     No Known Drug Allergies        Physical Exam   Vital Signs: Temp: 97.9  F (36.6  C) Temp src: Oral BP: 117/75 Pulse: 75   Resp: 16 SpO2: 100 % O2 Device: None (Room air)    Weight: 147 lbs 0 oz    CONSTITUTIONAL: Pt laying in bed, dressed in hospital garb. Appears comfortable. Cooperative with interview.   HEENT: Normocephalic, atraumatic. Pupils equal, round, and reactive to light. Negative for conjunctival redness or scleral icterus.  Oral mucosa dry appearing. Surgical site where 9 upper teeth were extracted mildly erythematous without active bleeding, some gingival swelling noted.   CARDIOVASCULAR: Irregularly irregular rhythm, rate controlled. No murmurs, rubs, or extra heart sounds appreciated. Pulses +2/4 and regular in upper and lower extremities, bilaterally.   RESPIRATORY: No increased work of breathing. CTA, bilat; no wheezes, rales, or rhonchi appreciated.  GASTROINTESTINAL:  Abdomen soft, non-distended. BS auscultated in all four quadrants. Negative for tenderness to palpation.  No masses or organomegaly noted.  MUSCULOSKELETAL: No gross deformities noted. Normal muscle tone.   HEMATOLOGIC/LYMPHATIC/IMMUNOLOGIC: Negative for lower extremity edema, bilaterally.  NEUROLOGIC: Alert and oriented to person, place, and time.  strength intact. No focal neuro deficits.   SKIN: Some bruising noted on hands bilaterally.     Data   Data reviewed today: I reviewed all medications, new labs  and imaging results over the last 24 hours. I personally reviewed all labs and imaging to date.     Recent Labs   Lab 03/17/21  0752   WBC 11.2*   HGB 9.8*   MCV 85         POTASSIUM 3.9   CHLORIDE 105   CO2 26   BUN 46*   CR 1.85*   ANIONGAP 9   LLOYD 9.9   *   ALBUMIN 3.9   PROTTOTAL 6.2*   BILITOTAL 0.8   ALKPHOS 62   ALT 12   AST 10   TROPI <0.015     Recent Results (from the past 24 hour(s))   CT Head w/o Contrast    Narrative    CT OF THE HEAD WITHOUT CONTRAST  3/17/2021 8:17 AM     COMPARISON: Head CT 10/30/2019.    HISTORY: Head trauma, minor (Age >= 65y).    TECHNIQUE: 5 mm thick axial CT images of the head were acquired  without IV contrast material.    FINDINGS:  There is moderate diffuse cerebral volume loss. There are  subtle patchy areas of decreased density in the cerebral white matter  bilaterally that are consistent with sequela of chronic small vessel  ischemic disease.     The ventricles and basal cisterns are within normal limits in  configuration given the degree of cerebral volume loss.  There is no  midline shift. There are no extra-axial fluid collections.     No intracranial hemorrhage, mass or recent infarct.    The visualized paranasal sinuses are well-aerated. There is no  mastoiditis. There are no fractures of the visualized bones.       Impression    IMPRESSION:  Diffuse cerebral volume loss and cerebral white matter  changes consistent with chronic small vessel ischemic disease. No  evidence for acute intracranial pathology.      Radiation dose for this scan was reduced using automated exposure  control, adjustment of the mA and/or kV according to patient size, or  iterative reconstruction technique.    RICHMOND SINGH MD

## 2021-03-18 ENCOUNTER — PATIENT OUTREACH (OUTPATIENT)
Dept: CARE COORDINATION | Facility: CLINIC | Age: 81
End: 2021-03-18

## 2021-03-18 ENCOUNTER — APPOINTMENT (OUTPATIENT)
Dept: CARDIOLOGY | Facility: CLINIC | Age: 81
End: 2021-03-18
Attending: PHYSICIAN ASSISTANT
Payer: COMMERCIAL

## 2021-03-18 DIAGNOSIS — R55 SYNCOPE: Primary | ICD-10-CM

## 2021-03-18 LAB
ANION GAP SERPL CALCULATED.3IONS-SCNC: 4 MMOL/L (ref 3–14)
BUN SERPL-MCNC: 30 MG/DL (ref 7–30)
CALCIUM SERPL-MCNC: 8.7 MG/DL (ref 8.5–10.1)
CHLORIDE SERPL-SCNC: 111 MMOL/L (ref 94–109)
CO2 SERPL-SCNC: 27 MMOL/L (ref 20–32)
CREAT SERPL-MCNC: 1.46 MG/DL (ref 0.66–1.25)
ERYTHROCYTE [DISTWIDTH] IN BLOOD BY AUTOMATED COUNT: 15.6 % (ref 10–15)
GFR SERPL CREATININE-BSD FRML MDRD: 44 ML/MIN/{1.73_M2}
GLUCOSE BLDC GLUCOMTR-MCNC: 127 MG/DL (ref 70–99)
GLUCOSE SERPL-MCNC: 127 MG/DL (ref 70–99)
HCT VFR BLD AUTO: 27 % (ref 40–53)
HGB BLD-MCNC: 8.6 G/DL (ref 13.3–17.7)
HGB BLD-MCNC: 8.8 G/DL (ref 13.3–17.7)
MCH RBC QN AUTO: 27.3 PG (ref 26.5–33)
MCHC RBC AUTO-ENTMCNC: 31.9 G/DL (ref 31.5–36.5)
MCV RBC AUTO: 86 FL (ref 78–100)
PLATELET # BLD AUTO: 230 10E9/L (ref 150–450)
POTASSIUM SERPL-SCNC: 4.2 MMOL/L (ref 3.4–5.3)
RBC # BLD AUTO: 3.15 10E12/L (ref 4.4–5.9)
SODIUM SERPL-SCNC: 142 MMOL/L (ref 133–144)
WBC # BLD AUTO: 8.8 10E9/L (ref 4–11)

## 2021-03-18 PROCEDURE — 93306 TTE W/DOPPLER COMPLETE: CPT | Mod: 26 | Performed by: INTERNAL MEDICINE

## 2021-03-18 PROCEDURE — 80048 BASIC METABOLIC PNL TOTAL CA: CPT | Performed by: PHYSICIAN ASSISTANT

## 2021-03-18 PROCEDURE — 85018 HEMOGLOBIN: CPT | Performed by: PHYSICIAN ASSISTANT

## 2021-03-18 PROCEDURE — 999N000147 HC STATISTIC PT IP EVAL DEFER

## 2021-03-18 PROCEDURE — 93306 TTE W/DOPPLER COMPLETE: CPT

## 2021-03-18 PROCEDURE — 85027 COMPLETE CBC AUTOMATED: CPT | Performed by: PHYSICIAN ASSISTANT

## 2021-03-18 PROCEDURE — 36415 COLL VENOUS BLD VENIPUNCTURE: CPT | Performed by: PHYSICIAN ASSISTANT

## 2021-03-18 PROCEDURE — 999N001017 HC STATISTIC GLUCOSE BY METER IP

## 2021-03-18 PROCEDURE — 99226 PR SUBSEQUENT OBSERVATION CARE,LEVEL III: CPT | Performed by: HOSPITALIST

## 2021-03-18 PROCEDURE — 250N000013 HC RX MED GY IP 250 OP 250 PS 637: Performed by: PHYSICIAN ASSISTANT

## 2021-03-18 PROCEDURE — G0378 HOSPITAL OBSERVATION PER HR: HCPCS

## 2021-03-18 RX ADMIN — APIXABAN 5 MG: 5 TABLET, FILM COATED ORAL at 08:48

## 2021-03-18 RX ADMIN — CARVEDILOL 3.12 MG: 3.12 TABLET, FILM COATED ORAL at 08:53

## 2021-03-18 RX ADMIN — CARVEDILOL 3.12 MG: 3.12 TABLET, FILM COATED ORAL at 17:30

## 2021-03-18 RX ADMIN — VERAPAMIL HYDROCHLORIDE 120 MG: 120 TABLET, FILM COATED, EXTENDED RELEASE ORAL at 21:17

## 2021-03-18 RX ADMIN — APIXABAN 5 MG: 5 TABLET, FILM COATED ORAL at 21:17

## 2021-03-18 ASSESSMENT — MIFFLIN-ST. JEOR: SCORE: 1343.11

## 2021-03-18 NOTE — CONSULTS
Care Management Initial Consult    General Information  Assessment completed with: Patient, Tc  Type of CM/SW Visit: Initial Assessment    Primary Care Provider verified and updated as needed: Yes   Readmission within the last 30 days: no previous admission in last 30 days      Reason for Consult: discharge planning  Advance Care Planning: Advance Care Planning Reviewed: no concerns identified(Pt is not interested in any information)          Communication Assessment  Patient's communication style: spoken language (English or Bilingual)    Hearing Difficulty or Deaf: no   Wear Glasses or Blind: no    Cognitive  Cognitive/Neuro/Behavioral: WDL                      Living Environment:   People in home: spouse     Current living Arrangements: house      Able to return to prior arrangements: yes  Living Arrangement Comments: They have a chair lift on both sets of stairs going into the basement and to the 2nd floor.    Family/Social Support:  Care provided by: self  Provides care for: no one  Marital Status:   Children          Description of Support System: Supportive, Involved    Support Assessment: Adequate social supports    Current Resources:   Patient receiving home care services: No     Community Resources: None  Equipment currently used at home: grab bar, toilet, grab bar, tub/shower, lift device, shower chair(chair lift on stairs)  Supplies currently used at home: None    Employment/Financial:  Employment Status: retired        Financial Concerns: No concerns identified           Lifestyle & Psychosocial Needs:        Socioeconomic History     Marital status:      Spouse name: Lianna     Number of children: 4     Years of education: 16     Highest education level: Not on file   Occupational History     Occupation: [employer]     Employer: [employer]      Tobacco Use     Smoking status: Former Smoker     Packs/day: 0.20     Years: 40.00     Pack years: 8.00     Types: Cigarettes      Start date:      Quit date: 2008     Years since quittin.2     Smokeless tobacco: Never Used     Tobacco comment: occasional   Substance and Sexual Activity     Alcohol use: Not Currently     Alcohol/week: 35.0 standard drinks     Frequency: Never     Drug use: Not Currently       Functional Status:  Prior to admission patient needed assistance:       None the pt and his wife are completely independent in all self care.  They both still drive.       Mental Health Status:  Mental Health Status: No Current Concerns       Chemical Dependency Status:  Chemical Dependency Status: No Current Concerns             Values/Beliefs:  Spiritual, Cultural Beliefs, Lutheran Practices, Values that affect care: no               Care Management Discharge Note    Discharge Date: 21(home)       Discharge Disposition: Home    Discharge Services: None    Discharge DME: Other (see comment), None(Pt has Shower Chair, grab bars, and chair lifts on stairwells at home.)    Discharge Transportation: car, drives self, family or friend will provide    Private pay costs discussed: Not applicable    PAS Confirmation Code:    Patient/family educated on Medicare website which has current facility and service quality ratings: no    Education Provided on the Discharge Plan:  yes  Persons Notified of Discharge Plans: patient  Patient/Family in Agreement with the Plan: yes    Handoff Referral Completed: No    Additional Information:  I spoke with Tc after his PT evaluation that stated he has not further PT needs.  Tc agrees with the PT evaluation.  He stated that both him and his wife are independent with all their ADL's and they both still drive.  They live in a private home and they do have their bedroom and bathroom on the main level.  They have a son near by that is helpful and comes over if they need any additional help eg moving something heavy.    Tc is very conscientious of having a safe home environment due to him and  his wife wanting to remain at home for as long as possible.  The have a shower chair and grab bars int he bathroom.   They have a power chair they use on the stairs going into the basement to do laundry and another power chair to go up to the 2nd level if they need to  He has walkers and canes at home but do not currently use them.    No needs for care coordination identified at this time.  I will watch for discharge orders and see if the pt would want any assistance with f/u appointments.    Currently the plan is for Tc to discharge to home when medically cleared to self care and no needs.    Pari Atkinson RN, BSN Care Coordinator  [facility]  Mobile: [phone]

## 2021-03-18 NOTE — UTILIZATION REVIEW
Concurrent stay review; Secondary Review Determination     Under the authority of the Utilization Management Committee, the utilization review process indicated a secondary review on the above patient. The review outcome is based on review of the medical records, discussions with staff, and applying clinical experience noted on the date of the review.     (x) Observation Status Appropriate - Concurrent stay review     RATIONALE FOR DETERMINATION:  81 year old male with PMHx of paroxysmal atrial fibrillation on Eliquis, CKD III, HFpEF, T2DM utilizing insulin pump, pulmonary hypertension, chronic normocytic anemia, and chronic SOB related to hx of recurrent pleural effusion and prior empyema (7/2020) as well as recent dental extraction of 9 teeth (3/8/21)  admitted on 3/17/2021 for generalized weakness and syncope, found to have orthostatic hypotension with ABBIE, likely in the context of dehydration related to dietary changes from tooth extraction.  He reportedly had some dark BM concerning for possible melena and will be observed in the hospital another night.  hgb initially dropped from 9.8 to 8.6 and has since stabalilized without need for a transfusion.  He initially had orthostatic vital signs but they have resolved with hydration.  Continued observation status appears appropriate.    Continued observation status is appropriate.      Patient is clinically improving and there is no clear indication to change patient's status to inpatient. The severity of illness, intensity of service provided, expected LOS and risk for adverse outcome make the care appropriate for observation.     The information on this document is developed by the utilization review team in order for the business office to ensure compliance. This only denotes the appropriateness of proper admission status and does not reflect the quality of care rendered.   The definitions of Inpatient Status and Observation Status used in making the  determination above are those provided in the CMS Coverage Manual, Chapter 1 and Chapter 6, section 70.4.     Sincerely,     Alejo Villavicencio MD  Utilization Review   Physician Advisor   [facility].

## 2021-03-18 NOTE — PROGRESS NOTES
Observation goals PRIOR TO DISCHARGE     Comments: List all  goals to be met before discharge:   - Diagnostic tests and consults completed and resulted: met   - No further episodes of syncope and any new arrhythmia addressed with controlled heart rates: met  - Vital signs normal or at patient baseline and orthostatic vitals are normal and patient not lightheaded with standing: not met  - Tolerating oral intake to maintain hydration: met  - Safe disposition plan has been identified: partially met  - Nurse to notify provider when observation goals have been met and patient is ready for discharge.

## 2021-03-18 NOTE — PROGRESS NOTES
Observation goals PRIOR TO DISCHARGE     Comments: List all  goals to be met before discharge:   - Diagnostic tests and consults completed and resulted: MET  - No further episodes of syncope and any new arrhythmia addressed with controlled heart rates: MET  - Vital signs normal or at patient baseline and orthostatic vitals are normal and patient not lightheaded with standing NOT met   - Tolerating oral intake to maintain hydration MET  - Safe disposition plan has been identified: MET

## 2021-03-18 NOTE — PLAN OF CARE
AO. VSS on RA. Tele: Afic, CVR. + orthostatics. SBA. Mod carb/mechanical soft diet: tolerating. PT consult complete. Echo complete: result pending.     Observation goals PRIOR TO DISCHARGE     Comments: List all  goals to be met before discharge:   - Diagnostic tests and consults completed and resulted: not met  - No further episodes of syncope and any new arrhythmia addressed with controlled heart rates: met   - Vital signs normal or at patient baseline and orthostatic vitals are normal and patient not lightheaded with standing: Not met   - Tolerating oral intake to maintain hydration: met  - Safe disposition plan has been identified: met  - Nurse to notify provider when observation goals have been met and patient is ready for discharge.

## 2021-03-18 NOTE — PROGRESS NOTES
Clinic Care Coordination Contact  Ambulatory Care Coordination to Inpatient Care Management   Hand-In Communication    Date:  March 18, 2021  Name: Tc Garcia is enrolled in Ambulatory Care Coordination program and I am the Lead Care Coordinator.  CC Contact Information: [employer] + phone  Payor Source: Payor: MEDICA / Plan: MED[employer] / Product Type: HMO /   Current services in place:     Please see the CC Snaphot and Care Management Flowsheets for specific  details of this Tc Garcia care plan.   Additional details/specific concerns r/t this admission:    Goals of Care ,, Home Safety ,, Discharge Disposition , and Caregiver Concerns .    I will follow this admission in Epic. Please feel free to contact me with questions or for further collaboration in discharge planning.

## 2021-03-18 NOTE — PLAN OF CARE
Up A1 GB. A&Ox4. Oral sutures with minimal drainage. Patient using gauze pads to minimize drainage. Denies pain. , 127. Tolerating mech soft MCHO diet. Plan to discharge to home later today.

## 2021-03-18 NOTE — PLAN OF CARE
PT: Eval orders received, chart reviewed. Pts BP stable during session with position change and activity. Denied dizziness. Pt demonstrates IND with bed mobility, transfers, gait. Pt ambulated 180' no assistive device, no LOB. Pt appears at baseline for mobility, no skilled PT needs identified.     Pt safe to discharge to home, no PT needs at discharge. Will complete orders.

## 2021-03-18 NOTE — PROGRESS NOTES
[facility]    Hospitalist Progress Note    Date of Service (when I saw the patient): 03/18/2021    Assessment & Plan   Tc Garcia is a 81 year old male with PMHx of paroxysmal atrial fibrillation on Eliquis, CKD III, HFpEF, T2DM utilizing insulin pump, pulmonary hypertension, chronic normocytic anemia, and chronic SOB related to hx of recurrent pleural effusion and prior empyema (7/2020) as well as recent dental extraction of 9 teeth (3/8/21)  admitted on 3/17/2021 for generalized weakness and syncope, found to have orthostatic hypotension with ABBIE, likely in the context of dehydration related to dietary changes from tooth extraction. Registered under observation status for further evaluation and treatment.      Generalized weakness with syncopal event   Orthostatic hypotension:   Recent tooth extraction 3/8/21 (9 upper teeth extracted, pt being fitted for dentures) with diet alteration to dental soft, but overall poor PO intake related to pain and post-procedural bleeding. For the past 3 days, reports dizziness with positional changes.  Morning of admission, got up from his recliner to get juice, felt profoundly dizzy and had a subsequent syncopal event. He hit his head on the fridge. EMS was called. Glucose 183. Noted to have positive orthostatics in the ED. Creatinine 1.85 (baseline 1.2-1.3), Hgb 9.8 (down from 11.8 on 2/9/21), mild leukocytosis (likely reactive); remainder of CBC, BMP unremarkable. TSH and trop negative. Received 1 L bolus in the ED. Has been holding Lasix for the past 2 days preemptively given poor PO intake, continuing to take all other medications. CT head negative for bleed. EKG with a fib with CVR.   --Telemetry   --IVF with 0.9% NS at 100 cc/hr   --Monitor orthostatics qshift, orthostatics remain positive today  --PT consulted, patient deemed safe to discharge home with no PT needs at discharge.  --SW for discharge planning, but can likely discharge to previous  living setting   --Continue with dental soft diet   ---Echocardiogram completed 3/18, LVEF normal 55-60%, mild TR, RV systolic pressure elevated consistent with mild pulmonary hypertension.  No significant change compared to previous from 5/20.  --Patient reports black stools, this could be from bleeding from his mouth after recent teeth extraction, but given his hemoglobin that is lower today we should watch for any further evidence of GI bleed.  If his stools return to normal, and hemoglobin remained stable, he could probably discharge tomorrow.     ABBIE on CKD III: Baseline creatinine 1.2-1.3. Creatinine on admission 1.85. Received 1 L bolus in the ED.   --Maintenance fluids as above   --Repeat creatinine 1.46  --Holding PTA Lasix, Spironolactone, Losartan   --BMP in the AM      Acute on chronic normocytic anemia: Baseline Hgb ranging from 9-11 over the past year. Hgb 11.8 on 2/9/21. Pt reports intermittent bleeding from tooth extraction sites since procedure on 3/8; had held Eliquis 3 days prior to procedure and restarted 24 hours after.   Recent Labs   Lab 03/18/21  1246 03/18/21  0613 03/17/21  0752   HGB 8.8* 8.6* 9.8*   --Hemoglobin 11.8 as of 2/9, now with acute drop  --Consent signed for blood transfusion in the event pt's Hgb drops further   --Continue Eliquis      T2DM utilizing insulin pump: Pt reports glucose readings have been stable, although with his limited diet, he has been eating and drinking more sugary foods/beverages. Glucose got up to 600 the evening prior to admission. Note one episode of DKA 5/2020 due to lack of insulin in pt's pump, otherwise no acute issues. Reports hypoglycemia every once in a while.   --Pt is appropriate to use his pump  --Form filled out with pump settings and orders placed in chart   --BS per protocol   --Monitor for hypoglycemia   Glucose Values Latest Ref Rng & Units 3/17/2021 3/17/2021 3/17/2021 3/17/2021 3/18/2021 3/18/2021   Bedside Glucose (mg/dl )  - --  -- -- -- -- --   GLUCOSE 70 - 99 mg/dL 158(H) 135(H) 248(H) 276(H) 127(H) 127(H)   Some recent data might be hidden     Paroxysmal atrial fibrillation, rate controlled  Chronic anticoagulation use: No focal neuro deficits on exam. Head CT negative. Last echo from 5/10/2020 with EF 55-60%, no RWMA, moderate pulmonary hypertension.   --Continue PTA Eliquis   --Continue Coreg and Verapamil once verified      HFpEF, not in acute exacerbation  Pulmonary hypertension: Pt appears dry on exam  --Hold PTA Lasix 40 mg po every day, Losartan 50 mg BID, and Spironolactone 50 mg daily given ABBIE above   --I&Os, daily weights      Dyslipidemia: Resume statin at discharge given observation status      Chronic SOB related to prior hx of recurrent left pleural effusion and development of empyema s/p chest tube placement and appropriate tx with prolonged IV antibiotics. At one point pt had Pleurx catheter for recurrent effusion. Stable. Not hypoxic. No new SOB or URI sx to warrant CXR at this time.         Diet:  Dental soft/Mod CHO  DVT Prophylaxis: Eliquis  Pruett Catheter: not present  Code Status:   DNR/DNI, confirmed at bedside.           Disposition Plan   Expected discharge:  Probably tomorrow 3/19 pending stable hemoglobin, black stools resolved, and creatinine downtrending.      Edmond Guerra [place]    Interval History   Patient feels generally weak, fatigued.  He is pale appearing.  He reports black stools earlier today.  Unsure if this is from blood loss from his mouth versus GI bleed.  Denies lightheadedness or syncope.  Denies fever, chills.  Denies chest pain, palpitation, shortness of breath, abdominal pain, nausea, vomiting.    -Data reviewed today: I reviewed all new labs and imaging results over the last 24 hours.     Physical Exam   Temp: 97.3  F (36.3  C) Temp src: Axillary BP: 112/61 Pulse: 50   Resp: 17 SpO2: 100 % O2 Device: None (Room air)    Vitals:    03/17/21 0746 03/18/21 0508   Weight: 66.7 kg (147 lb)  68.4 kg (150 lb 14.4 oz)     Vital Signs with Ranges  Temp:  [97.3  F (36.3  C)-98.7  F (37.1  C)] 97.3  F (36.3  C)  Pulse:  [50-81] 50  Resp:  [16-17] 17  BP: (112-131)/(60-75) 112/61  SpO2:  [97 %-100 %] 100 %  I/O last 3 completed shifts:  In: 1000 [IV Piggyback:1000]  Out: -     Constitutional: Elderly male, pale.  Alert, oriented to person, place, situation.  Cooperative, lying in bed in NAD.   Respiratory:  Lungs CTAB.  No crackles, wheezes, or rhonchi, no labored breathing.  Cardiovascular:  Heart RRR, no MRG, no edema.  GI:  Abdomen soft, NT/ND and with normoactive BS  Skin/Integumen:  Warm, dry, non-diaphoretic.  MSK: CMS x4 intact.    Medications     insulin basal rate for inpatient ambulatory pump 0.56 mL/hr at 03/17/21 1601     sodium chloride 100 mL/hr at 03/17/21 2234       apixaban ANTICOAGULANT  5 mg Oral BID     carvedilol  3.125 mg Oral BID w/meals     insulin aspart   Device See Admin Instructions     insulin bolus from AMBULATORY PUMP   Subcutaneous Daily     sodium chloride (PF)  3 mL Intracatheter Q8H     verapamil ER  120 mg Oral At Bedtime       Data   Recent Labs   Lab 03/18/21  0613 03/17/21  0752   WBC 8.8 11.2*   HGB 8.6* 9.8*   MCV 86 85    287    140   POTASSIUM 4.2 3.9   CHLORIDE 111* 105   CO2 27 26   BUN 30 46*   CR 1.46* 1.85*   ANIONGAP 4 9   LLOYD 8.7 9.9   * 158*   ALBUMIN  --  3.9   PROTTOTAL  --  6.2*   BILITOTAL  --  0.8   ALKPHOS  --  62   ALT  --  12   AST  --  10   TROPI  --  <0.015       Recent Results (from the past 24 hour(s))   CT Head w/o Contrast    Narrative    CT OF THE HEAD WITHOUT CONTRAST  3/17/2021 8:17 AM     COMPARISON: Head CT 10/30/2019.    HISTORY: Head trauma, minor (Age >= 65y).    TECHNIQUE: 5 mm thick axial CT images of the head were acquired  without IV contrast material.    FINDINGS:  There is moderate diffuse cerebral volume loss. There are  subtle patchy areas of decreased density in the cerebral white matter  bilaterally that are  consistent with sequela of chronic small vessel  ischemic disease.     The ventricles and basal cisterns are within normal limits in  configuration given the degree of cerebral volume loss.  There is no  midline shift. There are no extra-axial fluid collections.     No intracranial hemorrhage, mass or recent infarct.    The visualized paranasal sinuses are well-aerated. There is no  mastoiditis. There are no fractures of the visualized bones.       Impression    IMPRESSION:  Diffuse cerebral volume loss and cerebral white matter  changes consistent with chronic small vessel ischemic disease. No  evidence for acute intracranial pathology.      Radiation dose for this scan was reduced using automated exposure  control, adjustment of the mA and/or kV according to patient size, or  iterative reconstruction technique.    RICHMOND SINGH MD

## 2021-03-18 NOTE — PROGRESS NOTES
Observation goals PRIOR TO DISCHARGE       - Diagnostic tests and consults completed and resulted: Partially met  - No further episodes of syncope and any new arrhythmia addressed with controlled heart rates: Met   - Vital signs normal or at patient baseline and orthostatic vitals are normal and patient not lightheaded with standing: Met   - Tolerating oral intake to maintain hydration: Met  - Safe disposition plan has been identified: Met    - Nurse to notify provider when observation goals have been met and patient is ready for discharge.

## 2021-03-19 VITALS
WEIGHT: 152.1 LBS | OXYGEN SATURATION: 100 % | RESPIRATION RATE: 16 BRPM | HEART RATE: 61 BPM | DIASTOLIC BLOOD PRESSURE: 66 MMHG | BODY MASS INDEX: 23.87 KG/M2 | TEMPERATURE: 96.7 F | HEIGHT: 67 IN | SYSTOLIC BLOOD PRESSURE: 133 MMHG

## 2021-03-19 LAB
GLUCOSE BLDC GLUCOMTR-MCNC: 159 MG/DL (ref 70–99)
GLUCOSE BLDC GLUCOMTR-MCNC: 236 MG/DL (ref 70–99)

## 2021-03-19 PROCEDURE — 99217 PR OBSERVATION CARE DISCHARGE: CPT | Performed by: HOSPITALIST

## 2021-03-19 PROCEDURE — G0378 HOSPITAL OBSERVATION PER HR: HCPCS

## 2021-03-19 PROCEDURE — 96372 THER/PROPH/DIAG INJ SC/IM: CPT

## 2021-03-19 PROCEDURE — 99207 PR CDG-CODE CATEGORY CHANGED: CPT | Performed by: HOSPITALIST

## 2021-03-19 PROCEDURE — 250N000013 HC RX MED GY IP 250 OP 250 PS 637: Performed by: PHYSICIAN ASSISTANT

## 2021-03-19 PROCEDURE — 999N001017 HC STATISTIC GLUCOSE BY METER IP

## 2021-03-19 RX ORDER — LOSARTAN POTASSIUM 50 MG/1
50 TABLET ORAL DAILY
Qty: 180 TABLET | Refills: 3 | Status: SHIPPED | OUTPATIENT
Start: 2021-03-19 | End: 2021-04-19

## 2021-03-19 RX ORDER — FUROSEMIDE 40 MG
40 TABLET ORAL EVERY OTHER DAY
Qty: 90 TABLET | Refills: 3 | Status: SHIPPED | OUTPATIENT
Start: 2021-03-19 | End: 2021-04-19

## 2021-03-19 RX ADMIN — APIXABAN 5 MG: 5 TABLET, FILM COATED ORAL at 08:43

## 2021-03-19 RX ADMIN — CARVEDILOL 3.12 MG: 3.12 TABLET, FILM COATED ORAL at 08:43

## 2021-03-19 ASSESSMENT — MIFFLIN-ST. JEOR: SCORE: 1348.55

## 2021-03-19 NOTE — DISCHARGE SUMMARY
[facility]  Discharge Summary        Tc Garcia MRN# [id number]   YOB: 1940 Age: 81 year old     Date of Admission:  3/17/2021  Date of Discharge:  3/19/2021  Admitting Physician:  Kelley Fernandez, DO  Discharge Physician: Emma Guzman PA-C  Discharging Service: Hospitalist     Primary Provider:  Jessika Cordoba  Primary Care Physician Phone Number: [phone]         Discharge Diagnoses/Problem Oriented Hospital Course (Providers):    Tc Garcia is a 81 year old male with PMHx of paroxysmal atrial fibrillation on Eliquis, CKD III, HFpEF, T2DM utilizing insulin pump, pulmonary hypertension, chronic normocytic anemia, and chronic SOB related to hx of recurrent pleural effusion and prior empyema (7/2020) as well as recent dental extraction of 9 teeth (3/8/21)  admitted on 3/17/2021 for generalized weakness and syncope, found to have orthostatic hypotension with ABBIE, likely in the context of dehydration related to dietary changes from tooth extraction. Registered under observation status for further evaluation and treatment.      Generalized weakness with syncopal event   Orthostatic hypotension   Dehydration in the setting of poor oral intake and diuretics   HFpEF, not in acute exacerbation  Pulmonary hypertension  PTA diuretics = Lasix 40 mg po every day, Losartan 50 mg BID, Spironolactone 50 mg daily    Recent tooth extraction 3/8/21 (9 upper teeth extracted, pt being fitted for dentures) with diet alteration to dental soft, but overall poor PO intake related to pain and post-procedural bleeding. For the past 3 days, reports dizziness with positional changes.  Morning of admission, got up from his recliner to get juice, felt profoundly dizzy and had a subsequent syncopal event. He hit his head on the fridge. EMS was called. Glucose 183. Noted to have positive orthostatics in the ED. Creatinine 1.85 (baseline 1.2-1.3), Hgb 9.8 (down from 11.8 on  2/9/21), mild leukocytosis (likely reactive); remainder of CBC, BMP unremarkable. TSH and trop negative. Received 1 L bolus in the ED. Has been holding Lasix for the past 2 days preemptively given poor PO intake, continuing to take all other medications. CT head negative for bleed. EKG with a fib with CVR. Echocardiogram completed 3/18, LVEF normal 55-60%, mild TR, RV systolic pressure elevated consistent with mild pulmonary hypertension.  No significant change compared to previous from 5/20.  The patient was hydrated with IVF, vital signs improved.   By the time he was discharged, he was tolerating PO and normal bowel movements. PT consulted, patient deemed safe to discharge home with no PT needs at discharge.  PLAN:  -back to home  -Continue with dental soft diet   -slowly add back diuretics/BP meds slowly given recent dehydration , kidney injury and soft BP  -will do Losartan once daily (instead of bid), spirolactone and restart Lasix 3/21 on an every other day basis until seen by PCP   -home health nurse to check BP at home in a few days         ABBIE on CKD III: Baseline creatinine 1.2-1.3. Creatinine on admission 1.85. Received 1 L bolus in the ED. Repeat creatinine was @ his baseline, 1.46   PLAN:  -restart PTA Spironolactone, Losartan   -restart PTA Lasix at lower dose   -BMP with PCP      Acute on chronic normocytic anemia: Baseline Hgb ranging from 9-11 over the past year. Hgb 11.8 on 2/9/21. Pt reports intermittent bleeding from tooth extraction sites since procedure on 3/8; had held Eliquis 3 days prior to procedure and restarted 24 hours after.         Recent Labs   Lab 03/18/21  1246 03/18/21  0613 03/17/21  0752   HGB 8.8* 8.6* 9.8*   --Hemoglobin monitoring as OP   --Continue Eliquis      T2DM utilizing insulin pump: Pt reports glucose readings have been stable, although with his limited diet, he has been eating and drinking more sugary foods/beverages. Glucose got up to 600 the evening prior to  admission. Note one episode of DKA 5/2020 due to lack of insulin in pt's pump, otherwise no acute issues. Reports hypoglycemia every once in a while.   --Pt is appropriate to use his pump    Glucose Values Latest Ref Rng & Units 3/17/2021 3/17/2021 3/17/2021 3/17/2021 3/18/2021 3/18/2021   Bedside Glucose (mg/dl )  - -- -- -- -- -- --   GLUCOSE 70 - 99 mg/dL 158(H) 135(H) 248(H) 276(H) 127(H) 127(H)   Some recent data might be hidden      Paroxysmal atrial fibrillation, rate controlled  Chronic anticoagulation use: No focal neuro deficits on exam. Head CT negative. Last echo from 5/10/2020 with EF 55-60%, no RWMA, moderate pulmonary hypertension.   --Continue PTA Eliquis   --Continue Coreg and Verapamil    Dyslipidemia: statin at discharge    Chronic SOB related to prior hx of recurrent left pleural effusion and development of empyema s/p chest tube placement and appropriate tx with prolonged IV antibiotics. At one point pt had Pleurx catheter for recurrent effusion. Stable. Not hypoxic. No new SOB or URI sx to warrant CXR at this time.     Diet:  Dental soft/Mod CHO  DVT Prophylaxis: Eliquis  Pruett Catheter: not present  Code Status:   DNR/DNI, confirmed at bedside.   Follow up - see PCP in 5-7 days with JOE, AXEL Guzman PA-C (Salzmann)   Hospitalist  Pager: [phone]  9:31 AM  March 19, 2021             Pending Results:        Unresulted Labs Ordered in the Past 30 Days of this Admission     No orders found from 2/15/2021 to 3/18/2021.                  Discharge Disposition:      Discharged to home        Discharge Medications:        Current Discharge Medication List      CONTINUE these medications which have NOT CHANGED    Details   apixaban ANTICOAGULANT (ELIQUIS) 5 MG tablet Take 1 tablet (5 mg) by mouth 2 times daily  Qty: 60 tablet, Refills: 5    Associated Diagnoses: Chronic atrial fibrillation (H)      carvedilol (COREG) 6.25 MG tablet Take 0.5 tablets (3.125 mg) by mouth 2 times  daily (with meals)  Qty: 180 tablet, Refills: 1    Associated Diagnoses: Acute on chronic heart failure with preserved ejection fraction (HFpEF) (H)      furosemide (LASIX) 40 MG tablet Take 1 tablet (40 mg) by mouth daily  Qty: 90 tablet, Refills: 3    Associated Diagnoses: Recurrent pleural effusion on left      glucagon 1 MG kit 1 mg as needed for low blood sugar      HYDROcodone-acetaminophen (NORCO) 5-325 MG tablet Take 1 tablet by mouth every 6 hours as needed for severe pain      insulin aspart (NOVOLOG PEN) 100 UNIT/ML pen Inject 8 units subcutaneous with breakfast, 8 units subcutaneous with lunch and 4 units with supper.  Qty: 3 mL, Refills: 0    Comments: Future refills by PCP Dr. Senthil Moya with phone number [phone].  Associated Diagnoses: Diabetic ketoacidosis without coma associated with type 1 diabetes mellitus (H); Diabetic ketoacidosis without coma associated with diabetes mellitus due to underlying condition (H)      insulin aspart (NovoLOG) inject - to fill ambulatory pump by Device route See Admin Instructions      losartan (COZAAR) 50 MG tablet Take 1 tablet (50 mg) by mouth 2 times daily  Qty: 180 tablet, Refills: 3    Associated Diagnoses: Acute on chronic heart failure with preserved ejection fraction (HFpEF) (H)      LOVASTATIN 20 MG PO TABS 1 TABLET DAILY AT DINNER  Qty: 90 Tab, Refills: 0    Associated Diagnoses: Mixed hyperlipidemia      nystatin (MYCOSTATIN) 279699 UNIT/GM external cream Apply topically 2 times daily as needed       spironolactone (ALDACTONE) 25 MG tablet Take 2 tablets (50 mg) by mouth daily  Qty: 180 tablet, Refills: 3    Associated Diagnoses: Acute on chronic heart failure with preserved ejection fraction (HFpEF) (H)      verapamil ER (VERELAN) 120 MG 24 hr capsule Take 1 capsule (120 mg) by mouth At Bedtime  Qty: 90 capsule, Refills: 1    Associated Diagnoses: Atrial fibrillation, unspecified type (H)               Allergies:         Allergies   Allergen  "Reactions     No Known Drug Allergies            Consultations This Hospital Stay:      No consultations were requested during this admission        Condition and Physical on Discharge:      Discharge condition: Stable   Vitals: Blood pressure 133/66, pulse 61, temperature 96.7  F (35.9  C), temperature source Oral, resp. rate 16, height 1.702 m (5' 7\"), weight 69 kg (152 lb 1.6 oz), SpO2 100 %.    Constitutional: Elderly male, pale.  Alert, oriented to person, place, situation.  Cooperative, lying in bed in NAD.   Respiratory:  Lungs CTAB.  No crackles, wheezes, or rhonchi, no labored breathing.  Cardiovascular:  Heart RRR, no MRG, no edema.  GI:  Abdomen soft, NT/ND and with normoactive BS  Skin/Integumen:  Warm, dry, non-diaphoretic.  MSK: CMS x4 intact.              Discharge Time:       Greater than 30 minutes.        Image Results From This Hospital Stay (For Non-EPIC Providers):        Results for orders placed or performed during the hospital encounter of 03/17/21   CT Head w/o Contrast    Narrative    CT OF THE HEAD WITHOUT CONTRAST  3/17/2021 8:17 AM     COMPARISON: Head CT 10/30/2019.    HISTORY: Head trauma, minor (Age >= 65y).    TECHNIQUE: 5 mm thick axial CT images of the head were acquired  without IV contrast material.    FINDINGS:  There is moderate diffuse cerebral volume loss. There are  subtle patchy areas of decreased density in the cerebral white matter  bilaterally that are consistent with sequela of chronic small vessel  ischemic disease.     The ventricles and basal cisterns are within normal limits in  configuration given the degree of cerebral volume loss.  There is no  midline shift. There are no extra-axial fluid collections.     No intracranial hemorrhage, mass or recent infarct.    The visualized paranasal sinuses are well-aerated. There is no  mastoiditis. There are no fractures of the visualized bones.       Impression    IMPRESSION:  Diffuse cerebral volume loss and cerebral white " matter  changes consistent with chronic small vessel ischemic disease. No  evidence for acute intracranial pathology.      Radiation dose for this scan was reduced using automated exposure  control, adjustment of the mA and/or kV according to patient size, or  iterative reconstruction technique.    RICHMOND SINGH MD   Echocardiogram Complete    Narrative    [id number]  [id number]  [id number]  [id number]^VASQUEZ^NEGAR^HOMA           [facility]  Echocardiography Laboratory  [facility] [place] [address]        Name: CEDRICK PHILLIPS  MRN: [id number]  : 1940  Study Date: 2021 02:17 PM  Age: 81 yrs  Gender: Male  [facility]  Reason For Study: Syncope  Ordering Physician: NEGAR VASQUEZ  Referring Physician: Jessika Cordoba  Performed By: Jessika Duenas     HR: 60  _____________________________________________________________________________  __        Procedure  Complete Portable Echo Adult.  _____________________________________________________________________________  __        Interpretation Summary     Left ventricular systolic function is normal.  The visual ejection fraction is estimated at 55-60%.  There is mild (1+) tricuspid regurgitation.  Right ventricular systolic pressure is elevated, consistent with mild  pulmonary hypertension.  No significant change compared to prior study from . The study was  technically difficult.  _____________________________________________________________________________  __        Left Ventricle  The left ventricle is normal in size. There is normal left ventricular wall  thickness. Left ventricular systolic function is normal. The visual ejection  fraction is estimated at 55-60%. Left ventricular diastolic function is  indeterminate. No regional wall motion abnormalities noted.     Right Ventricle  Borderline right ventricular enlargement. The right ventricular systolic  function is normal.     Atria  The left  atrium is mildly dilated. Borderline right atrial enlargement.     Mitral Valve  There is mild mitral annular calcification. The mitral valve leaflets are  mildly thickened. There is trace mitral regurgitation.     Tricuspid Valve  There is mild (1+) tricuspid regurgitation. The right ventricular systolic  pressure is approximated at 40.5 mmHg plus the right atrial pressure. Right  ventricular systolic pressure is elevated, consistent with mild pulmonary  hypertension. IVC diameter <2.1 cm collapsing >50% with sniff suggests a  normal RA pressure of 3 mmHg.        Aortic Valve  There is mild trileaflet aortic sclerosis. No aortic stenosis is present.     Pulmonic Valve  The pulmonic valve is not well seen, but is grossly normal.     Vessels  Borderline aortic root dilatation.     Pericardium  There is no pericardial effusion.     Rhythm  The rhythm was undetermined.     _____________________________________________________________________________  __  MMode/2D Measurements & Calculations  IVSd: 0.86 cm  LVIDd: 4.7 cm  LVIDs: 3.5 cm  LVPWd: 0.87 cm  FS: 25.8 %  LV mass(C)d: 133.9 grams     Ao root diam: 3.7 cm  LA dimension: 4.6 cm  asc Aorta Diam: 3.6 cm  LA/Ao: 1.2  LA Volume (BP): 69.3 ml  RWT: 0.37        Doppler Measurements & Calculations  MV E max twin: 117.0 cm/sec  MV A max twin: 46.1 cm/sec  MV E/A: 2.5  MV dec slope: 691.7 cm/sec2  MV dec time: 0.17 sec  PA acc time: 0.10 sec  TR max twin: 318.0 cm/sec  TR max P.5 mmHg  E/E' av.8  Lateral E/e': 12.0  Medial E/e': 17.6              _____________________________________________________________________________  __        Report approved by: Momo Cooper 2021 02:55 PM              Most Recent Lab Results In EPIC (For Non-EPIC Providers):    Most Recent 3 CBC's:  Recent Labs   Lab Test 21  1246 21  0613 21  0752 20  1434 20  1434   WBC  --  8.8 11.2*  --  13.8*   HGB 8.8* 8.6* 9.8*   < > 10.5*   MCV  --  86 85  --   85   PLT  --  230 287  --  485*    < > = values in this interval not displayed.      Most Recent 3 BMP's:  Recent Labs   Lab Test 03/18/21  0613 03/17/21  0752 02/09/21  1306    140 135   POTASSIUM 4.2 3.9 5.2   CHLORIDE 111* 105 103   CO2 27 26 27   BUN 30 46* 28   CR 1.46* 1.85* 1.38*   ANIONGAP 4 9 5   LLOYD 8.7 9.9 9.7   * 158* 282*     Most Recent 3 Troponin's:  Recent Labs   Lab Test 03/17/21  0752 07/08/20  1223 05/21/20  1302   TROPI <0.015 <0.015 0.034     Most Recent 3 INR's:  Recent Labs   Lab Test 11/21/20  1146 07/17/20  0950 05/10/20  2207   INR 1.50* 1.18* 1.49*     Most Recent 2 LFT's:  Recent Labs   Lab Test 03/17/21  0752 12/14/20   AST 10 28  28   ALT 12 38  38   ALKPHOS 62 449*  449*   BILITOTAL 0.8 1.0  1.0     Most Recent Cholesterol Panel:  Recent Labs   Lab Test 05/13/20  0553   CHOL 116   LDL 43   HDL 60   TRIG 65     Most Recent 6 Bacteria Isolates From Any Culture (See EPIC Reports for Culture Details):  Recent Labs   Lab Test 11/22/20  0954 11/20/20  1756 07/11/20  1500 07/10/20  2302 07/10/20  2221 05/21/20  1916   CULT No anaerobes isolated  Light growth  Serratia marcescens  *  Light growth  Escherichia coli  * Cultured on the 1st day of incubation:  Escherichia coli  *  Critical Value/Significant Value, preliminary result only, called to and read back by  Emilee Benito RN at 0640 11/21/20 hg    (Note)  POSITIVE for E.COLI by Verigene multiplex nucleic acid test. Final  identification and antimicrobial susceptibility testing will be  verified by standard methods. Verigene test will not distinguish  E.coli from Shigella species including S.dysenteriae, S.flexneri,  S.boydii, and S.sonnei. Specimens containing Shigella species or  E.coli will be reported as Positive for E.coli.    Specimen tested with Verigene multiplex, gram-negative blood culture  nucleic acid test for the following targets: Acinetobacter sp.,  Citrobacter sp., Enterobacter sp., Proteus sp., E.  coli, K.  pneumoniae/oxytoca, P. aeruginosa, and the following resistance  markers: CTXM, KPC, NDM, VIM, IMP and OXA.    Critical Value/Significant Value called to and read back by  Lena Andrews RN 0856 11/21/20 AM      Cultured on the 1st day of incubation:  Escherichia coli  Susceptibility testing done on previous specimen  *  Critical Value/Significant Value, preliminary result only, called to and read back by  Lena Andrews RN 0834 11/21/20 AM   Moderate growth  Staphylococcus aureus  *  Critical Value/Significant Value, preliminary result only, called to and read back by  Alexandra Washburn RN 7.12.20 1357. ALEX   No growth No growth No growth     Most Recent TSH, T4 and HgbA1c:   Recent Labs   Lab Test 03/17/21  0752 11/20/20  2233 08/10/20  1027   TSH 0.94  --  4.75*   T4  --   --  1.11   A1C  --  7.7*  --

## 2021-03-19 NOTE — PROGRESS NOTES
"[facility]  Reviewed notes, pt discharged back to home today. Plan to slowly add back diuretics and BP meds given recent kidney injury and soft BP. Restarted Losartan at once daily instead of PTA BID. Spironolactone and Lasix to be restarted 3/21 on every other day basis until seen by PCP. Discharging with home health. Home health visit \"in few days\" to check BP. Initial home health visit scheduled for 3/21. Pt to follow up with PCP in 5-7 days with BMP and CBC.     No future appointments in [facility]. Patient's PCP is through [facility].     Messaged [facility] RN board to check for weight on Monday 3/22 and call pt for update.      Dilma Rene, RN, BSN, CHFN  03/19/21 at 2:10 PM     "

## 2021-03-19 NOTE — PLAN OF CARE
Patient is discharging. All belongings with patient. Discharge meds will be filled here and sent with patient. All discharge instructions discussed w/ patient and questions were answered. PIV removed. Ride will be coming to  pt gets discharge meds.

## 2021-03-19 NOTE — PROGRESS NOTES
Observation goals PRIOR TO DISCHARGE    Comments: List all  goals to be met before discharge:   - Diagnostic tests and consults completed and resulted- Partially met  - No further episodes of syncope and any new arrhythmia addressed with controlled heart rates -Met  - Vital signs normal or at patient baseline and orthostatic vitals are normal and patient not lightheaded with standing -Met  - Tolerating oral intake to maintain hydration-Met  - Safe disposition plan has been identified -met  - Nurse to notify provider when observation goals have been met and patient is ready for discharge.

## 2021-03-19 NOTE — PROGRESS NOTES
Covid negative. AxOx4. SBA 1+. VSS on RA. Tele A-fib with CVR. Orthos negative. Denies pain/SOB/dizziness/lightheadedness. Monitoring hemoglobin, @ 1246 8.8. Echo done today. Mod carb diet. Basal insulin pump. Planning on discharge tomorrow. Continue to monitor.

## 2021-03-19 NOTE — PROGRESS NOTES
[facility]  Spoke with patient to discuss plans for home care services.  Patient to be discharged home today and has agreed to have [facility] provide RN services. Patient care support center processing referral.  Patient verbalized understanding that initial visit is scheduled for [place] 3/21/21. Patient has 24 hour phone number for [facility] for any questions or concerns.

## 2021-03-19 NOTE — PLAN OF CARE
Pt is A&O x4. VSS on RA. Tele A- fib. Up SBA. Mod CHO diet. PIV SL. Denied pain/SOB/Nausea. Basal insulin pump. Possible discharge today. Continue to monitor.     Observation goals PRIOR TO DISCHARGE    Comments: List all  goals to be met before discharge:   - Diagnostic tests and consults completed and resulted- Partially met  - No further episodes of syncope and any new arrhythmia addressed with controlled heart rates -Met  - Vital signs normal or at patient baseline and orthostatic vitals are normal and patient not lightheaded with standing -Met  - Tolerating oral intake to maintain hydration-Met  - Safe disposition plan has been identified -met  - Nurse to notify provider when observation goals have been met and patient is ready for discharge.

## 2021-03-19 NOTE — PROGRESS NOTES
Observation Goals  - Diagnostic tests and consults completed and resulted   Met  - No further episodes of syncope and any new arrhythmia addressed with controlled heart rates   Met  - Vital signs normal or at patient baseline and orthostatic vitals are normal and patient not lightheaded with standing   Met  - Tolerating oral intake to maintain hydration   Met  - Safe disposition plan has been identified   Met    - Nurse to notify provider when observation goals have been met and patient is ready for discharge.    Provider aware all goals met, pt lives at home with wife and would discharge here.

## 2021-03-22 LAB
ALBUMIN SERPL-MCNC: 4 G/DL (ref 3.6–4.6)
ALP SERPL-CCNC: 62 U/L (ref 39–117)
ALT SERPL-CCNC: 5 U/L (ref 0–44)
ANION GAP SERPL CALCULATED.3IONS-SCNC: ABNORMAL MMOL/L
AST SERPL-CCNC: 12 U/L (ref 0–40)
BASOPHILS NFR BLD AUTO: ABNORMAL %
BILIRUB SERPL-MCNC: 0.5 MG/DL (ref 0–1.2)
BUN SERPL-MCNC: 25 MG/DL (ref 8–27)
CALCIUM SERPL-MCNC: 9.3 MG/DL (ref 8.6–10.2)
CHLORIDE SERPLBLD-SCNC: 102 MMOL/L (ref 96–106)
CO2 SERPL-SCNC: 22 MMOL/L (ref 20–29)
CREAT SERPL-MCNC: 1.66 MG/DL (ref 0.76–1.27)
EOSINOPHIL NFR BLD AUTO: ABNORMAL %
ERYTHROCYTE [DISTWIDTH] IN BLOOD BY AUTOMATED COUNT: 17.5 % (ref 11.7–15.4)
GFR SERPL CREATININE-BSD FRML MDRD: 38 ML/MIN/1.73M2
GLUCOSE SERPL-MCNC: 294 MG/DL (ref 65–99)
HCT VFR BLD AUTO: 29 % (ref 36–51.8)
HEMOGLOBIN: 9.3 G/DL (ref 13.5–18)
LYMPHOCYTES NFR BLD AUTO: ABNORMAL %
MCH RBC QN AUTO: 27.1 PG (ref 32.5–35)
MCHC RBC AUTO-ENTMCNC: 32.3 G/DL (ref 32.5–37)
MCV RBC AUTO: 84.1 FL (ref 81–100)
MONOCYTES NFR BLD AUTO: ABNORMAL %
NEUTROPHILS NFR BLD AUTO: ABNORMAL %
PLATELET COUNT - QUEST: 312 10^9/L (ref 130–400)
POTASSIUM SERPL-SCNC: 4.7 MMOL/L (ref 3.5–5.2)
PROT SERPL-MCNC: 5.6 G/DL (ref 6–8.5)
RBC # BLD AUTO: 3.44 10^12/L (ref 4.3–5.78)
SODIUM SERPL-SCNC: 139 MMOL/L (ref 134–144)
WBC # BLD AUTO: 10.7 10^9/L (ref 3.5–10.8)

## 2021-03-22 NOTE — PROGRESS NOTES
[facility]  Called pt for update. He is feeling great. He reports after tooth extraction, he wasn't eating and felt horrible. Now he is feeling so much better. Weight is doing much better. Weight when he got home was 154#, and today was 151#. He restarted diuretics yesterday per discharge instructions. Pt is just leaving now to go see his PCP this afternoon with labs. Told pt I will plan to connect with him again tomorrow to see what PCP recommends at visit today and then we will get plan when he should see FORTUNATO Ortiz based on PCP instructions. Pt stated understanding.     Dilma Rene, RN, BSN, CHFN  03/22/21 at 1:30 PM     [facility] [facility] CHF Flowsheet My weight today is:   3/11/2021 151   3/12/2021 150   3/14/2021 145   3/15/2021 146   3/16/2021 147   3/20/2021 154   3/22/2021 151

## 2021-03-23 NOTE — PROGRESS NOTES
[facility]  Called pt for update today. He reports feeling great. Weight today 150#. He forgot to call in. Reminded him to start calling daily again, he will do so starting tomorrow. Pt saw PCP yesterday and said she was quite please with things. PCP stopped Spironolactone for 1 week per pt, and instructed pt to let us know. Told pt I would request records and labs. Pt has home health right now as well. Will send FYI to FORTUNATO Ortiz once PCP records received.     PCP records requested via fax.     Dilma Rene RN, BSN, CHFN  03/23/21 at 3:24 PM

## 2021-03-25 ENCOUNTER — PATIENT OUTREACH (OUTPATIENT)
Dept: CARE COORDINATION | Facility: CLINIC | Age: 81
End: 2021-03-25

## 2021-03-25 NOTE — PROGRESS NOTES
[facility]  Received PCP records from [facility]. Office note dated 3/22/21. Per note, Spironolactone still on hold, Losartan at 1/2 dose (once daily versus BID, and Lasix 40mg every other day (just restarted 3/21). Plan: Will check labs but will likely maintain same for a week then increase Lasix and restart Spironolactone. Next follow up 4/5/21. CMP and CBC drawn 3/22. Results entered in Epic and below for review. Sent update to FORTUNATO Ortiz. Records sent to scan.     No future appointments. Orders for [facility] follow up 5/2021.    Weight today 149#, 1# below goal weight, down from 150# yesterday. Weight goal currently 150-156#. Does weight goal need to be adjusted?       Dilma Rene RN, BSN, CHFN  03/25/21 at 10:15 AM

## 2021-03-25 NOTE — PROGRESS NOTES
Clinic Care Coordination Contact    Clinic Care Coordination Contact  OUTREACH    Referral Information:   Pt referred for support re: recent inability to eat due to teeth being pulled out, recent hospitalizaiton due to syncopal episode and collapse.         Chief Complaint   Patient presents with     Clinic Care Coordination - [facility]        Universal Utilization: na  Clinic Utilization  Difficulty keeping appointments:: No  Compliance Concerns: No  No-Show Concerns: No  Utilization    Last refreshed: 3/25/2021 11:06 AM: Hospital Admissions 10           Last refreshed: 3/25/2021 11:06 AM: ED Visits 7           Last refreshed: 3/25/2021 11:06 AM: No Show Count (past year) 3              Current as of: 3/25/2021 11:06 AM              Clinical Concerns:  Current Medical Concerns:  Chronic dyspnea on exertion related to deconditioning, prior history of recurrent left pleural effusion.  Heart failure with preserved ejection fraction.  Pulmonary hypertension.  Insulin-dependent diabetes mellitus  type II.     Current Behavioral Concerns: None   Education Provided to patient: None  Pain  Pain (GOAL):: No  Health Maintenance Reviewed: Up to date  Clinical Pathway: None    Medication Management:  apixaban ANTICOAGULANT (ELIQUIS) 5 MG tablet Take 1 tablet (5 mg) by mouth 2 times daily  Qty: 60 tablet, Refills: 5     Associated Diagnoses: Chronic atrial fibrillation (H)       carvedilol (COREG) 6.25 MG tablet Take 0.5 tablets (3.125 mg) by mouth 2 times daily (with meals)  Qty: 180 tablet, Refills: 1     Associated Diagnoses: Acute on chronic heart failure with preserved ejection fraction (HFpEF) (H)       furosemide (LASIX) 40 MG tablet Take 1 tablet (40 mg) by mouth daily  Qty: 90 tablet, Refills: 3     Associated Diagnoses: Recurrent pleural effusion on left       glucagon 1 MG kit 1 mg as needed for low blood sugar       HYDROcodone-acetaminophen (NORCO) 5-325 MG tablet Take 1 tablet by mouth every 6 hours as needed  for severe pain       insulin aspart (NOVOLOG PEN) 100 UNIT/ML pen Inject 8 units subcutaneous with breakfast, 8 units subcutaneous with lunch and 4 units with supper.  Qty: 3 mL, Refills: 0     Comments: Future refills by PCP Dr. Senthil Moya with phone number [phone].  Associated Diagnoses: Diabetic ketoacidosis without coma associated with type 1 diabetes mellitus (H); Diabetic ketoacidosis without coma associated with diabetes mellitus due to underlying condition (H)       insulin aspart (NovoLOG) inject - to fill ambulatory pump by Device route See Admin Instructions       losartan (COZAAR) 50 MG tablet Take 1 tablet (50 mg) by mouth 2 times daily  Qty: 180 tablet, Refills: 3     Associated Diagnoses: Acute on chronic heart failure with preserved ejection fraction (HFpEF) (H)       LOVASTATIN 20 MG PO TABS 1 TABLET DAILY AT DINNER  Qty: 90 Tab, Refills: 0     Associated Diagnoses: Mixed hyperlipidemia       nystatin (MYCOSTATIN) 487343 UNIT/GM external cream Apply topically 2 times daily as needed        spironolactone (ALDACTONE) 25 MG tablet Take 2 tablets (50 mg) by mouth daily  Qty: 180 tablet, Refills: 3     Associated Diagnoses: Acute on chronic heart failure with preserved ejection fraction (HFpEF) (H)       verapamil ER (VERELAN) 120 MG 24 hr capsule Take 1 capsule (120 mg) by mouth At Bedtime  Qty: 90 capsule, Refills: 1     Associated Diagnoses: Atrial fibrillation, unspecified type (H)             Functional Status: No concerns, has supports in home, shower chair, chair lift up and down stairs.       Living Situation:   lives in home with wife.    Lifestyle & Psychosocial Needs:       PC to Tc after his recent d/c from [facility] for syncope event and collapse. He was discharged home with nursing visits scheduled out fora  couple of weeks. [facility] phoned Tc to check in and asses for needs. He states that the nurse has been out but has not done anything but ask him questions, take his temp and  blood pressure. He states that he doesn't believe he needs nursing care b/c he takes good care of himself. He states he regulary takes his temperature, BP, and h2o levels. He reports to be eating and sleeping well and moves around fine. [facility] suggested he meet with the nurse at his/her next visit and discuss this and decide if they feel he needs further supports. Pt. stated he would connect with nursing staff when they come next.    [facility] will outreach to Tc next week to assess for needs.  Diet:: Regular  Inadequate nutrition (GOAL):: No  Tube Feeding: No  Inadequate activity/exercise (GOAL):: No  Significant changes in sleep pattern (GOAL): No  Transportation means:: Regular car         Socioeconomic History     Marital status:      Spouse name: Lianna     Number of children: 4     Years of education: 16     Highest education level: Not on file   Occupational History     Occupation: [employer]     Employer: [employer]      Tobacco Use     Smoking status: Former Smoker     Packs/day: 0.20     Years: 40.00     Pack years: 8.00     Types: Cigarettes     Start date:      Quit date: 2008     Years since quittin.2     Smokeless tobacco: Never Used     Tobacco comment: occasional   Substance and Sexual Activity     Alcohol use: Not Currently     Alcohol/week: 35.0 standard drinks     Frequency: Never     Drug use: Not Currently               Resources and Interventions:  Current Resources:                )   )               Goals:       Patient/Caregiver understanding: yes    Outreach Frequency: weekly  Future Appointments              Today Kathleen Mojica, [facility] Care Coordination,  ADINA          Plan: pt. To continue with home nursing care  And will discuss necessity with home care nursing and PCP

## 2021-03-26 NOTE — PROGRESS NOTES
Sounds like PCP is managing appropriately! Why don't I plan to see him back a couple weeks after his PCP's next visit?

## 2021-03-26 NOTE — PROGRESS NOTES
[facility]  Called pt to review, he is in agreement with plan. Scheduled pt to see FORTUNATO Ortiz 2 weeks after PCP visit. Pt thinks they are drawing labs 4/5 at PCP visit, will hold off on scheduling labs with 4/19 [facility] visit at this time. Will continue to monitor.     Dilma Rene RN, BSN, CHFN  03/26/21 at 12:29 PM

## 2021-04-01 ENCOUNTER — PATIENT OUTREACH (OUTPATIENT)
Dept: CARE COORDINATION | Facility: CLINIC | Age: 81
End: 2021-04-01

## 2021-04-01 NOTE — PROGRESS NOTES
Clinic Care Coordination Contact    Follow Up Progress Note      Assessment: PC to pt to follow up on last weeks discussion on continuing home care. Tc stated he spoke with home care and they decided to put it on hold for now and that he would reach out if he needed anything. Tc made an appt.with PCP next week.     Goals addressed this encounter:   Goals Addressed    None          Intervention/Education provided during outreach: na          Plan:   Tc to follow up with pcp next week.  Care Coordinator will close out CC

## 2021-04-05 ENCOUNTER — TRANSFERRED RECORDS (OUTPATIENT)
Dept: HEALTH INFORMATION MANAGEMENT | Facility: CLINIC | Age: 81
End: 2021-04-05

## 2021-04-05 LAB
BUN SERPL-MCNC: 16 MG/DL (ref 8–27)
CALCIUM SERPL-MCNC: 9.9 MG/DL (ref 8.6–10.2)
CHLORIDE SERPLBLD-SCNC: 103 MMOL/L (ref 96–106)
CO2 SERPL-SCNC: 29 MMOL/L (ref 20–29)
CREAT SERPL-MCNC: 1.2 MG/DL (ref 0.76–1.27)
GFR SERPL CREATININE-BSD FRML MDRD: 56 ML/MIN/1.73M2
GLUCOSE SERPL-MCNC: 200 MG/DL (ref 70–99)
POTASSIUM SERPL-SCNC: 4.8 MMOL/L (ref 3.5–5.2)
SODIUM SERPL-SCNC: 142 MMOL/L (ref 134–144)

## 2021-04-13 ENCOUNTER — DOCUMENTATION ONLY (OUTPATIENT)
Dept: CARDIOLOGY | Facility: CLINIC | Age: 81
End: 2021-04-13

## 2021-04-19 ENCOUNTER — OFFICE VISIT (OUTPATIENT)
Dept: CARDIOLOGY | Facility: CLINIC | Age: 81
End: 2021-04-19
Payer: COMMERCIAL

## 2021-04-19 VITALS
BODY MASS INDEX: 24.67 KG/M2 | OXYGEN SATURATION: 98 % | HEART RATE: 79 BPM | SYSTOLIC BLOOD PRESSURE: 140 MMHG | HEIGHT: 67 IN | DIASTOLIC BLOOD PRESSURE: 78 MMHG | WEIGHT: 157.2 LBS

## 2021-04-19 DIAGNOSIS — I50.33 ACUTE ON CHRONIC HEART FAILURE WITH PRESERVED EJECTION FRACTION (HFPEF) (H): ICD-10-CM

## 2021-04-19 DIAGNOSIS — J90 RECURRENT PLEURAL EFFUSION ON LEFT: ICD-10-CM

## 2021-04-19 DIAGNOSIS — I50.32 CHRONIC HEART FAILURE WITH PRESERVED EJECTION FRACTION (HFPEF) (H): ICD-10-CM

## 2021-04-19 PROCEDURE — 99214 OFFICE O/P EST MOD 30 MIN: CPT | Performed by: PHYSICIAN ASSISTANT

## 2021-04-19 RX ORDER — LOSARTAN POTASSIUM 50 MG/1
50 TABLET ORAL 2 TIMES DAILY
COMMUNITY
Start: 2021-04-19 | End: 2021-06-09

## 2021-04-19 RX ORDER — LOSARTAN POTASSIUM 50 MG/1
50 TABLET ORAL
Qty: 180 TABLET | Refills: 3 | COMMUNITY
Start: 2021-04-19 | End: 2021-04-19

## 2021-04-19 RX ORDER — FUROSEMIDE 40 MG
40 TABLET ORAL DAILY
Qty: 90 TABLET | Refills: 3 | COMMUNITY
Start: 2021-04-19 | End: 2021-07-06 | Stop reason: DRUGHIGH

## 2021-04-19 ASSESSMENT — MIFFLIN-ST. JEOR: SCORE: 1371.68

## 2021-04-19 NOTE — PATIENT INSTRUCTIONS
Today, we discussed the following:   - Medication changes:  Your medication list from today reflects what you were taking before the hospital. Check that against your current bottles at home to make sure you're taking the right thing. Call me with any questions.   - Follow up: With nephrology next week. They will re-check your blood test.   With me in 3 months.     Please, remember to continue the followin.  Weigh yourself daily. Call if your weight is up > than 2 pounds in one day, or 5 pounds in one week; if you feel more short of breath or have worsening swelling in your legs or abdomen.  2.  Eat a low sodium diet (less than 2,000mg or 2g daily). If you eat less salt, you will retain less fluid.   3.  Avoid alcohol, as this can worsen heart failure.   4.  Avoid NSAIDs as able (For example, Ibuprofen / aleve / advil / naprosen / diclofenac).    Please call [facility] nurse (Dilma) for any questions or concerns Mon-Fri 8am-4pm:   [phone]  For concerns after hours:   [phone] option 2   Scheduling phone number:   [phone]    Thank you for visiting with us today.   Ame Mejía PA-C  ______________________________________________________________

## 2021-04-19 NOTE — PROGRESS NOTES
Cardiology Clinic Progress Note    Tc Garcia MRN# [id number]   YOB: 1940 Age: 81 year old   Primary cardiologist: Dr. Julien         Assessment and Plan:     In summary, Tc Garcia presents to the [facility] for follow up on chronic HFpEF.  He was unfortunately admitted 1 month ago for weakness in the setting of dehydration after tooth extraction.  His losartan, spironolactone, and furosemide were all briefly held, then slowly re-initiated.  He is now back to taking his PTA medications except for Losartan (still 1/2 PTA dose), and appears well-compensated at a weight of 157 lbs in clinic (150 lbs at home) but slightly hypertensive.     Plan:  - We will have him return to his PTA regimen. He had been very stable on that prior to the sudden change in his intake last month, which has now normalized. He will therefore increase his Losartan from 50 mg daily to BID. He has follow up / reassessment with nephrology arranged for next week.    Follow-up:  - I will see him back in 3 months in the [facility]. We will continue to monitor him with [facility] in the meantime.         History of Presenting Illness:      Tc Garcia is a pleasant 81 year old patient who presents today for a [facility] follow up visit.     The patient has a complex history of the following -   # Chronic HFpEF, with multiple admissions in 2020. Enrolled in [facility] with goal wt 150-156 lbs.  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 Anticoagulated with Eliquis.  # Poorly-controlled DMII  # HTN  # HLP  # CKD with variable renal function response to diuretics, follows with [facility] (Vidhi Galo). Baseline creat ~ 1.3.  # Anemia of chronic disease, requiring intermittent Fe infusions, followed by nephrology  # Obesity  # Hx of medication confusion and non-compliance, self-adjusts diuretics frequently.  # Social - Lives with wife, Radha. Sedentary. High sodium diet, medication noncompliance, some concern over cognitive function and medical literacy.     Recent admissions:  - Beginning of May 2020 with progressive dyspnea and due to recurrent (transudative) pleural effusion a left PleurX catheter was placed. He was diuresed with IV furosemide and given empiric abx therapy. Echo showed normal LVEF 55-60% with normal wall motion. The RV was moderately dilated with decreased systolic function and mild TR. He underwent a RHC during that stay that showed mild PH (PA pressure of 28 mmHg (51/15), mean RA of 4mmHg, and mildly elevated filling pressures with a wedge of 12mmHg (Vwave 16). PVR was 3.9 Wood units and Carlos Manuel CO/CI was 4.09/2.23. With vasodilator challenge, his PVR normalized to 1.22 wood units. Therefore, he was deemed to have pulmonary hypertension out of proportion to his left heart disease. He was started on Cialis 5mg daily but because this was not a formulated preparation, was switched to sildenafil 20 mg 3 times daily and discharged home. Interestingly, he was not discharged on any diuretics. Discharge wt was 152 lbs. His admission was complicated by atrial flutter for which he was given amiodarone but because of prolongation in QTc, this was discontinued, and he was continued on his PTA verapamil.    Following discharge he developed symptomatic hypotension and via phone was told to stop the sildenafil. He then saw Dr. Chad gusman for PH evaluation a few days later. He reported hypertension with SBP >200mmHg and weight was up 12 lbs from discharge. Torsemide 10 mg daily, Losartan 25 mg daily, and KCL 40 mEq were all started. She recommended resuming sildenafil once he was  "felt to be euvolemic.    - Readmitted from 5/21 to 5/29 with uncontrolled diabetes and recurrent acute HFpEF, related to medication noncompliance. He again required IV lasix and was discharged on Lasix 40mg BID in place of the torsemide. Due to renal concerns, his hydrochlorothiazide and losartan were held. Fortunately, home health was arranged to help him manage his medications at home.   - 7/8-7/10/20 for re-accumulating pleural effusion, at which time IR evaluated his pleurex and was able to place to suction with 550ml fluid removed. He was also diuresed with good result.   - 7/10-7/18/20 for weakness/fall resulting in head contusion. He was found to have an empyema and received antibiotics. His pleurex was removed and a chest tube was placed. CT drained \"nearly all the fluid\" and was removed day prior to discharge.  ____________________________________    Since the admissions in 2020, I've been following Tc closely in [facility] and enrolled him in the [facility] system. His weights and symptoms have fluctuated (typically related to diet, uncontrolled blood pressures and self-dosing diuretics), and most recently he had a bit of a prolonged recovery following abdominal hernia repair, but I'm happy to say he hasn't been readmitted with heart failure since the summer. His recent weight goal has been 150-156 lbs.    When I saw him last beginning of February, he appeared euvolemic and normotensive at a weight of 153 pounds on furosemide 40 mg daily.  Increase his Eliquis dose from 2.5 twice daily to 5 mg twice daily, given an improved and stable creatinine less than 1.5 in the recent many months.     At the beginning of March, Tc had 9 teeth extracted. This resulted in decreased oral intake and a fair amount of gum bleeding for the following week, and he was unfortunately admitted March 17 for weakness & near-syncope in the setting of dehydration.  IVF was administered, and his losartan, Lasix, and spironolactone were all " "held.  At discharge two days later, Lasix was restarted at 40 mg every other day and losartan was restarted at half his PTA dose.  He has since seen his PCP who has slowly placed him back on his PTA regimen, save for his Losartan which has been kept at 50 mg daily as opposed to BID. His creatinine as of April 5 was back to baseline at 1.2, with a BUN of 16, and potassium of 4.8.    Tc is now doing better. His intake is back to normal. He is not limited by dyspnea, and doesn't think he needs pulmonary rehab (previously referred). He denies chest pain, orthopnea, edema, claudication, palpitations, recurrent near syncope or syncope. Weight at home is stable around 150 lbs. BP in clinic today is slightly elevated. He states his BP's at home have been 130's/-70's-80's. On my manual re-check it's 140/78 mmHg. We reviewed his inpatient ECHO from 3/18, which showed an EF of 55-60%, just mild PHTN and mild TR.         Review of Systems:     12-pt ROS is negative except for as noted in the HPI.          Physical Exam:     Vitals: BP (!) 162/88   Pulse 79   Ht 1.702 m (5' 7\")   Wt 71.3 kg (157 lb 3.2 oz)   SpO2 98%   BMI 24.62 kg/m    Wt Readings from Last 10 Encounters:   04/19/21 71.3 kg (157 lb 3.2 oz)   03/19/21 69 kg (152 lb 1.6 oz)   12/09/20 70.1 kg (154 lb 8 oz)   11/20/20 68 kg (150 lb)   11/16/20 69.1 kg (152 lb 4.8 oz)   10/27/20 68.9 kg (151 lb 14.4 oz)   10/01/20 68 kg (150 lb)   09/02/20 68.3 kg (150 lb 8 oz)   08/19/20 73 kg (161 lb)   08/11/20 70.3 kg (155 lb)       Constitutional:  Patient is pleasant, alert, cooperative, and in NAD.  HEENT:  NCAT. PERRLA. EOM's intact.   Neck:  CVP appears normal. No carotid bruits.   Pulmonary: Normal respiratory effort. CTAB.   Cardiac: Irregularly irregular, variable S1, no S3/S4, no murmur or rub.   Abdomen:  Non-tender abdomen, no hepatosplenomegaly appreciated.   Vascular: Pulses in the upper and lower extremities are 2+ and equal bilaterally.  Extremities: No " edema, erythema, cyanosis or tenderness appreciated.  Skin:  No rashes or lesions appreciated.   Neurological:  No gross motor or sensory deficits.   Psych: Appropriate affect.        Data:     Labs reviewed:  Recent Labs   Lab Test 03/17/21  0752 02/09/21  1306 12/01/20  1434 09/10/20  1359 08/10/20  1027 08/10/20  1027 05/13/20  0553 05/13/20  0553 05/12/20  0541 05/11/20  0542   LDL  --   --   --   --   --   --   --  43  --   --    HDL  --   --   --   --   --   --   --  60  --   --    NHDL  --   --   --   --   --   --   --  56  --   --    CHOL  --   --   --   --   --   --   --  116  --   --    TRIG  --   --   --   --   --   --   --  65  --   --    TSH 0.94  --   --   --   --  4.75*  --   --  3.47  --    NTBNP  --  3,149* 3,052* 2,611*   < >  --    < >  --   --   --    IRON  --   --   --   --   --   --   --   --   --  16*   FEB  --   --   --   --   --   --   --   --   --  161*   IRONSAT  --   --   --   --   --   --   --   --   --  10*   ERNESTO  --   --   --   --   --   --   --   --   --  142    < > = values in this interval not displayed.       Lab Results   Component Value Date    WBC 10.7 03/22/2021    RBC 3.44 (A) 03/22/2021    HGB 9.3 (A) 03/22/2021    HCT 29.0 (A) 03/22/2021    MCV 84.1 03/22/2021    MCH 27.1 (A) 03/22/2021    MCHC 32.3 (A) 03/22/2021    RDW 17.5 (A) 03/22/2021     03/22/2021     03/18/2021       Lab Results   Component Value Date     04/05/2021    POTASSIUM 4.8 04/05/2021    CHLORIDE 103 04/05/2021    CO2 29 04/05/2021    ANIONGAP 4 03/18/2021     (A) 04/05/2021    BUN 16 04/05/2021    CR 1.20 04/05/2021    GFRESTIMATED 56 (A) 04/05/2021    GFRESTBLACK 65 04/05/2021    LLOYD 9.9 04/05/2021      Lab Results   Component Value Date    AST 12 03/22/2021    ALT 5 03/22/2021       Lab Results   Component Value Date    A1C 7.7 (H) 11/20/2020       Lab Results   Component Value Date    INR 1.50 (H) 11/21/2020    INR 1.18 (H) 07/17/2020           Problem List:     Patient Active  Problem List   Diagnosis     DM type 2, Hgb A1C 8.2 on 4/25/20     Mixed hyperlipidemia     Essential hypertension     Pain in limb     Plantar fascial fibromatosis     HYPERLIPIDEMIA LDL GOAL <100     Hemothorax on left     Recurrent Left Pleural effusion -- S/P Pleurex Cath 5/12/20     ABBIE (acute kidney injury) (H)     Acute respiratory failure with hypoxia (H)     Pulmonary hypertension (H)     CRF (chronic renal failure), stage 3      Anemia of chronic disease     Atrial fibrillation/flutter -- on Eliquis and Amiodarone     DKA (diabetic ketoacidoses) (H)     Anemia in CKD (chronic kidney disease)     Dyspnea     SOB (shortness of breath)     Non-recurrent bilateral inguinal hernia without obstruction or gangrene     Acute cholecystitis     Abdominal pain, generalized     Non-intractable vomiting with nausea, unspecified vomiting type     Alcohol dependence (H)     CHF (congestive heart failure) (H)     Syncope and collapse     Weakness     Postoperative state     Acute kidney injury (H)           Medications:     Current Outpatient Medications   Medication Sig Dispense Refill     apixaban ANTICOAGULANT (ELIQUIS) 5 MG tablet Take 1 tablet (5 mg) by mouth 2 times daily 60 tablet 5     carvedilol (COREG) 6.25 MG tablet Take 6.25 mg by mouth daily  180 tablet 1     furosemide (LASIX) 40 MG tablet Take 1 tablet (40 mg) by mouth every other day 90 tablet 3     glucagon 1 MG kit 1 mg as needed for low blood sugar       losartan (COZAAR) 50 MG tablet Take 1 tablet (50 mg) by mouth daily 180 tablet 3     LOVASTATIN 20 MG PO TABS 1 TABLET DAILY AT DINNER 90 Tab 0     nystatin (MYCOSTATIN) 700542 UNIT/GM external cream Apply topically 2 times daily as needed        spironolactone (ALDACTONE) 25 MG tablet Take 2 tablets (50 mg) by mouth daily 180 tablet 3     verapamil ER (VERELAN) 120 MG 24 hr capsule Take 1 capsule (120 mg) by mouth At Bedtime 90 capsule 1     HYDROcodone-acetaminophen (NORCO) 5-325 MG tablet Take 1  tablet by mouth every 6 hours as needed for severe pain             Past Medical History:     Past Medical History:   Diagnosis Date     Anemia      Anemia of chronic disease 5/14/2020     Back pain      CKD (chronic kidney disease) stage 3, GFR 30-59 ml/min      CRF (chronic renal failure), stage 3  5/14/2020     Fall 11/2019    suffered multiple left rib fractures, C3 and T2 fractures     Mixed hyperlipidemia 7/7/2004     Paroxysmal atrial fibrillation (H)      Personal history of colonic polyps 1972    gets colonoscopy every five years, due in 2006     Pleural effusion on left 11/2019    after multiple rib fractures     Pulmonary hypertension (H) 5/10/2020    Added automatically from request for surgery [id number]     Recurrent Left Pleural effusion -- S/P Pleurex Cath 5/12/20 12/30/2019     Rosacea 1989     Type II or unspecified type diabetes mellitus without mention of complication, not stated as uncontrolled 1999     Unspecified essential hypertension 1994     Past Surgical History:   Procedure Laterality Date     ANESTHESIA CARDIOVERSION N/A 2/3/2020    Procedure: ANESTHESIA, FOR CARDIOVERSION;  Surgeon: GENERIC ANESTHESIA PROVIDER;  Location:  OR      RIGHT HEART CATH MEASUREMENTS RECORDED N/A 5/13/2020    Procedure: Right Heart Cath;  Surgeon: Senthil Silva MD;  Location:  HEART CARDIAC CATH LAB     HC REMOVE TONSILS/ADENOIDS,<13 Y/O      T & A <11y.o.     IR CHEST TUBE DRAIN TUNNELED LEFT  5/12/2020     IR CHEST TUBE PLACEMENT NON-TUNNELLED LEFT  7/11/2020     IR CHEST TUBE REMOVAL NON TUNNELED LEFT  7/17/2020     IR CHEST TUBE REMOVAL TUNNELED LEFT  7/11/2020     LAPAROSCOPIC CHOLECYSTECTOMY N/A 11/22/2020    Procedure: CHOLECYSTECTOMY, LAPAROSCOPIC;  Surgeon: Annette Lambert MD;  Location:  OR     LAPAROSCOPIC HERNIORRHAPHY INGUINAL BILATERAL Bilateral 10/27/2020    Procedure: LAPAROSCOPIC BILATERAL INGUINAL HERNIA REPAIR WITH MESH;  Surgeon: Brian Garsia MD;  Location:  OR      Family History   Problem Relation Age of Onset     Family History Negative Mother          age 71     Family History Negative Father          age 70     Diabetes Brother         alive age 77     Diabetes Brother         alive age 76     Family History Negative Brother              Family History Negative Brother              Diabetes Sister         alive age74     Family History Negative Sister          age 86     Heart Disease Sister              Family History Negative Sister              Family History Negative Sister              Diabetes Sister      Diabetes Sister      Diabetes Brother      Diabetes Brother      Social History     Socioeconomic History     Marital status:      Spouse name: Lianna     Number of children: 4     Years of education: 16     Highest education level: Not on file   Occupational History     Occupation: [employer]     Employer: [employer]    Social Needs     Financial resource strain: Not on file     Food insecurity     Worry: Not on file     Inability: Not on file     Transportation needs     Medical: Not on file     Non-medical: Not on file   Tobacco Use     Smoking status: Former Smoker     Packs/day: 0.20     Years: 40.00     Pack years: 8.00     Types: Cigarettes     Start date:      Quit date: 2008     Years since quittin.3     Smokeless tobacco: Never Used   Substance and Sexual Activity     Alcohol use: Not Currently     Alcohol/week: 35.0 standard drinks     Frequency: Never     Drug use: Not Currently     Sexual activity: Not on file   Lifestyle     Physical activity     Days per week: Not on file     Minutes per session: Not on file     Stress: Not on file   Relationships     Social connections     Talks on phone: Not on file     Gets together: Not on file     Attends Rastafari service: Not on file     Active member of club or organization: Not on file     Attends meetings of  clubs or organizations: Not on file     Relationship status: Not on file     Intimate partner violence     Fear of current or ex partner: Not on file     Emotionally abused: Not on file     Physically abused: Not on file     Forced sexual activity: Not on file   Other Topics Concern     Parent/sibling w/ CABG, MI or angioplasty before 65F 55M? Not Asked   Social History Narrative     Not on file           Allergies:   No known drug allergies      Ame Mejía PA-C  [facility]  Pager: [phone]

## 2021-04-19 NOTE — LETTER
4/19/2021    Jessika Ratliff Adalid  [address]  [facility] [address]    RE: Tc Garcia       Dear Colleague,    I had the pleasure of seeing Tc Garcia in the [facility].  Cardiology Clinic Progress Note    Tc Garcia MRN# [id number]   YOB: 1940 Age: 81 year old   Primary cardiologist: Dr. Julien         Assessment and Plan:     In summary, Tc Garcia presents to the CORE clinic for follow up on chronic HFpEF.  He was unfortunately admitted 1 month ago for weakness in the setting of dehydration after tooth extraction.  His losartan, spironolactone, and furosemide were all briefly held, then slowly re-initiated.  He is now back to taking his PTA medications except for Losartan (still 1/2 PTA dose), and appears well-compensated at a weight of 157 lbs in clinic (150 lbs at home) but slightly hypertensive.     Plan:  - We will have him return to his PTA regimen. He had been very stable on that prior to the sudden change in his intake last month, which has now normalized. He will therefore increase his Losartan from 50 mg daily to BID. He has follow up / reassessment with nephrology arranged for next week.    Follow-up:  - I will see him back in 3 months in the CORE clinic. We will continue to monitor him with MHT in the meantime.         History of Presenting Illness:      Tc Garcia is a pleasant 81 year old patient who presents today for a CORE clinic follow up visit.     The patient has a complex history of the following -   # Chronic HFpEF, with multiple admissions in 2020. Enrolled in MHT with goal wt 150-156 lbs.  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 Anticoagulated with Eliquis.  # Poorly-controlled DMII  # HTN  # HLP  # CKD with variable renal function response to diuretics, follows with [facility] (Vidhi Galo). Baseline creat ~ 1.3.  # Anemia of chronic disease, requiring intermittent Fe infusions, followed by nephrology  # Obesity  # Hx of medication confusion and non-compliance, self-adjusts diuretics frequently.  # Social - Lives with wife, Radha. Sedentary. High sodium diet, medication noncompliance, some concern over cognitive function and medical literacy.     Recent admissions:  - Beginning of May 2020 with progressive dyspnea and due to recurrent (transudative) pleural effusion a left PleurX catheter was placed. He was diuresed with IV furosemide and given empiric abx therapy. Echo showed normal LVEF 55-60% with normal wall motion. The RV was moderately dilated with decreased systolic function and mild TR. He underwent a RHC during that stay that showed mild PH (PA pressure of 28 mmHg (51/15), mean RA of 4mmHg, and mildly elevated filling pressures with a wedge of 12mmHg (Vwave 16). PVR was 3.9 Wood units and Carlos Manuel CO/CI was 4.09/2.23. With vasodilator challenge, his PVR normalized to 1.22 wood units. Therefore, he was deemed to have pulmonary hypertension out of proportion to his left heart disease. He was started on Cialis 5mg daily but because this was not a formulated preparation, was switched to sildenafil 20 mg 3 times daily and discharged home. Interestingly, he was not discharged on any diuretics. Discharge wt was 152 lbs. His admission was complicated by atrial flutter for which he was given amiodarone but because of prolongation in QTc, this was discontinued, and he was continued on his PTA verapamil.    Following discharge he developed symptomatic hypotension and via phone was told to stop the sildenafil. He then saw Dr. Chad gusman  "for PH evaluation a few days later. He reported hypertension with SBP >200mmHg and weight was up 12 lbs from discharge. Torsemide 10 mg daily, Losartan 25 mg daily, and KCL 40 mEq were all started. She recommended resuming sildenafil once he was felt to be euvolemic.    - Readmitted from 5/21 to 5/29 with uncontrolled diabetes and recurrent acute HFpEF, related to medication noncompliance. He again required IV lasix and was discharged on Lasix 40mg BID in place of the torsemide. Due to renal concerns, his hydrochlorothiazide and losartan were held. Fortunately, home health was arranged to help him manage his medications at home.   - 7/8-7/10/20 for re-accumulating pleural effusion, at which time IR evaluated his pleurex and was able to place to suction with 550ml fluid removed. He was also diuresed with good result.   - 7/10-7/18/20 for weakness/fall resulting in head contusion. He was found to have an empyema and received antibiotics. His pleurex was removed and a chest tube was placed. CT drained \"nearly all the fluid\" and was removed day prior to discharge.  ____________________________________    Since the admissions in 2020, I've been following Tc closely in [facility] and enrolled him in the T system. His weights and symptoms have fluctuated (typically related to diet, uncontrolled blood pressures and self-dosing diuretics), and most recently he had a bit of a prolonged recovery following abdominal hernia repair, but I'm happy to say he hasn't been readmitted with heart failure since the summer. His recent weight goal has been 150-156 lbs.    When I saw him last beginning of February, he appeared euvolemic and normotensive at a weight of 153 pounds on furosemide 40 mg daily.  Increase his Eliquis dose from 2.5 twice daily to 5 mg twice daily, given an improved and stable creatinine less than 1.5 in the recent many months.     At the beginning of March, Tc had 9 teeth extracted. This resulted in decreased oral " "intake and a fair amount of gum bleeding for the following week, and he was unfortunately admitted March 17 for weakness & near-syncope in the setting of dehydration.  IVF was administered, and his losartan, Lasix, and spironolactone were all held.  At discharge two days later, Lasix was restarted at 40 mg every other day and losartan was restarted at half his PTA dose.  He has since seen his PCP who has slowly placed him back on his PTA regimen, save for his Losartan which has been kept at 50 mg daily as opposed to BID. His creatinine as of April 5 was back to baseline at 1.2, with a BUN of 16, and potassium of 4.8.    Tc is now doing better. His intake is back to normal. He is not limited by dyspnea, and doesn't think he needs pulmonary rehab (previously referred). He denies chest pain, orthopnea, edema, claudication, palpitations, recurrent near syncope or syncope. Weight at home is stable around 150 lbs. BP in clinic today is slightly elevated. He states his BP's at home have been 130's/-70's-80's. On my manual re-check it's 140/78 mmHg. We reviewed his inpatient ECHO from 3/18, which showed an EF of 55-60%, just mild PHTN and mild TR.         Review of Systems:     12-pt ROS is negative except for as noted in the HPI.          Physical Exam:     Vitals: BP (!) 162/88   Pulse 79   Ht 1.702 m (5' 7\")   Wt 71.3 kg (157 lb 3.2 oz)   SpO2 98%   BMI 24.62 kg/m    Wt Readings from Last 10 Encounters:   04/19/21 71.3 kg (157 lb 3.2 oz)   03/19/21 69 kg (152 lb 1.6 oz)   12/09/20 70.1 kg (154 lb 8 oz)   11/20/20 68 kg (150 lb)   11/16/20 69.1 kg (152 lb 4.8 oz)   10/27/20 68.9 kg (151 lb 14.4 oz)   10/01/20 68 kg (150 lb)   09/02/20 68.3 kg (150 lb 8 oz)   08/19/20 73 kg (161 lb)   08/11/20 70.3 kg (155 lb)       Constitutional:  Patient is pleasant, alert, cooperative, and in NAD.  HEENT:  NCAT. PERRLA. EOM's intact.   Neck:  CVP appears normal. No carotid bruits.   Pulmonary: Normal respiratory effort. CTAB. "   Cardiac: Irregularly irregular, variable S1, no S3/S4, no murmur or rub.   Abdomen:  Non-tender abdomen, no hepatosplenomegaly appreciated.   Vascular: Pulses in the upper and lower extremities are 2+ and equal bilaterally.  Extremities: No edema, erythema, cyanosis or tenderness appreciated.  Skin:  No rashes or lesions appreciated.   Neurological:  No gross motor or sensory deficits.   Psych: Appropriate affect.        Data:     Labs reviewed:  Recent Labs   Lab Test 03/17/21  0752 02/09/21  1306 12/01/20  1434 09/10/20  1359 08/10/20  1027 08/10/20  1027 05/13/20  0553 05/13/20  0553 05/12/20  0541 05/11/20  0542   LDL  --   --   --   --   --   --   --  43  --   --    HDL  --   --   --   --   --   --   --  60  --   --    NHDL  --   --   --   --   --   --   --  56  --   --    CHOL  --   --   --   --   --   --   --  116  --   --    TRIG  --   --   --   --   --   --   --  65  --   --    TSH 0.94  --   --   --   --  4.75*  --   --  3.47  --    NTBNP  --  3,149* 3,052* 2,611*   < >  --    < >  --   --   --    IRON  --   --   --   --   --   --   --   --   --  16*   FEB  --   --   --   --   --   --   --   --   --  161*   IRONSAT  --   --   --   --   --   --   --   --   --  10*   ERNESTO  --   --   --   --   --   --   --   --   --  142    < > = values in this interval not displayed.       Lab Results   Component Value Date    WBC 10.7 03/22/2021    RBC 3.44 (A) 03/22/2021    HGB 9.3 (A) 03/22/2021    HCT 29.0 (A) 03/22/2021    MCV 84.1 03/22/2021    MCH 27.1 (A) 03/22/2021    MCHC 32.3 (A) 03/22/2021    RDW 17.5 (A) 03/22/2021     03/22/2021     03/18/2021       Lab Results   Component Value Date     04/05/2021    POTASSIUM 4.8 04/05/2021    CHLORIDE 103 04/05/2021    CO2 29 04/05/2021    ANIONGAP 4 03/18/2021     (A) 04/05/2021    BUN 16 04/05/2021    CR 1.20 04/05/2021    GFRESTIMATED 56 (A) 04/05/2021    GFRESTBLACK 65 04/05/2021    LLOYD 9.9 04/05/2021      Lab Results   Component Value Date     AST 12 03/22/2021    ALT 5 03/22/2021       Lab Results   Component Value Date    A1C 7.7 (H) 11/20/2020       Lab Results   Component Value Date    INR 1.50 (H) 11/21/2020    INR 1.18 (H) 07/17/2020           Problem List:     Patient Active Problem List   Diagnosis     DM type 2, Hgb A1C 8.2 on 4/25/20     Mixed hyperlipidemia     Essential hypertension     Pain in limb     Plantar fascial fibromatosis     HYPERLIPIDEMIA LDL GOAL <100     Hemothorax on left     Recurrent Left Pleural effusion -- S/P Pleurex Cath 5/12/20     ABBIE (acute kidney injury) (H)     Acute respiratory failure with hypoxia (H)     Pulmonary hypertension (H)     CRF (chronic renal failure), stage 3      Anemia of chronic disease     Atrial fibrillation/flutter -- on Eliquis and Amiodarone     DKA (diabetic ketoacidoses) (H)     Anemia in CKD (chronic kidney disease)     Dyspnea     SOB (shortness of breath)     Non-recurrent bilateral inguinal hernia without obstruction or gangrene     Acute cholecystitis     Abdominal pain, generalized     Non-intractable vomiting with nausea, unspecified vomiting type     Alcohol dependence (H)     CHF (congestive heart failure) (H)     Syncope and collapse     Weakness     Postoperative state     Acute kidney injury (H)           Medications:     Current Outpatient Medications   Medication Sig Dispense Refill     apixaban ANTICOAGULANT (ELIQUIS) 5 MG tablet Take 1 tablet (5 mg) by mouth 2 times daily 60 tablet 5     carvedilol (COREG) 6.25 MG tablet Take 6.25 mg by mouth daily  180 tablet 1     furosemide (LASIX) 40 MG tablet Take 1 tablet (40 mg) by mouth every other day 90 tablet 3     glucagon 1 MG kit 1 mg as needed for low blood sugar       losartan (COZAAR) 50 MG tablet Take 1 tablet (50 mg) by mouth daily 180 tablet 3     LOVASTATIN 20 MG PO TABS 1 TABLET DAILY AT DINNER 90 Tab 0     nystatin (MYCOSTATIN) 567698 UNIT/GM external cream Apply topically 2 times daily as needed        spironolactone  (ALDACTONE) 25 MG tablet Take 2 tablets (50 mg) by mouth daily 180 tablet 3     verapamil ER (VERELAN) 120 MG 24 hr capsule Take 1 capsule (120 mg) by mouth At Bedtime 90 capsule 1     HYDROcodone-acetaminophen (NORCO) 5-325 MG tablet Take 1 tablet by mouth every 6 hours as needed for severe pain             Past Medical History:     Past Medical History:   Diagnosis Date     Anemia      Anemia of chronic disease 5/14/2020     Back pain      CKD (chronic kidney disease) stage 3, GFR 30-59 ml/min      CRF (chronic renal failure), stage 3  5/14/2020     Fall 11/2019    suffered multiple left rib fractures, C3 and T2 fractures     Mixed hyperlipidemia 7/7/2004     Paroxysmal atrial fibrillation (H)      Personal history of colonic polyps 1972    gets colonoscopy every five years, due in 2006     Pleural effusion on left 11/2019    after multiple rib fractures     Pulmonary hypertension (H) 5/10/2020    Added automatically from request for surgery [id number]     Recurrent Left Pleural effusion -- S/P Pleurex Cath 5/12/20 12/30/2019     Rosacea 1989     Type II or unspecified type diabetes mellitus without mention of complication, not stated as uncontrolled 1999     Unspecified essential hypertension 1994     Past Surgical History:   Procedure Laterality Date     ANESTHESIA CARDIOVERSION N/A 2/3/2020    Procedure: ANESTHESIA, FOR CARDIOVERSION;  Surgeon: GENERIC ANESTHESIA PROVIDER;  Location:  OR      RIGHT HEART CATH MEASUREMENTS RECORDED N/A 5/13/2020    Procedure: Right Heart Cath;  Surgeon: Senthil Silva MD;  Location:  HEART CARDIAC CATH LAB     HC REMOVE TONSILS/ADENOIDS,<11 Y/O      T & A <11y.o.     IR CHEST TUBE DRAIN TUNNELED LEFT  5/12/2020     IR CHEST TUBE PLACEMENT NON-TUNNELLED LEFT  7/11/2020     IR CHEST TUBE REMOVAL NON TUNNELED LEFT  7/17/2020     IR CHEST TUBE REMOVAL TUNNELED LEFT  7/11/2020     LAPAROSCOPIC CHOLECYSTECTOMY N/A 11/22/2020    Procedure: CHOLECYSTECTOMY, LAPAROSCOPIC;   Surgeon: Annette Lambert MD;  Location:  OR     LAPAROSCOPIC HERNIORRHAPHY INGUINAL BILATERAL Bilateral 10/27/2020    Procedure: LAPAROSCOPIC BILATERAL INGUINAL HERNIA REPAIR WITH MESH;  Surgeon: Brian Garsia MD;  Location:  OR     Family History   Problem Relation Age of Onset     Family History Negative Mother          age 71     Family History Negative Father          age 70     Diabetes Brother         alive age 77     Diabetes Brother         alive age 76     Family History Negative Brother              Family History Negative Brother              Diabetes Sister         alive age76     Family History Negative Sister          age 86     Heart Disease Sister              Family History Negative Sister              Family History Negative Sister              Diabetes Sister      Diabetes Sister      Diabetes Brother      Diabetes Brother      Social History     Socioeconomic History     Marital status:      Spouse name: Lianna     Number of children: 4     Years of education: 16     Highest education level: Not on file   Occupational History     Occupation: [employer]     Employer: [employer]   Social Needs     Financial resource strain: Not on file     Food insecurity     Worry: Not on file     Inability: Not on file     Transportation needs     Medical: Not on file     Non-medical: Not on file   Tobacco Use     Smoking status: Former Smoker     Packs/day: 0.20     Years: 40.00     Pack years: 8.00     Types: Cigarettes     Start date:      Quit date: 2008     Years since quittin.3     Smokeless tobacco: Never Used   Substance and Sexual Activity     Alcohol use: Not Currently     Alcohol/week: 35.0 standard drinks     Frequency: Never     Drug use: Not Currently     Sexual activity: Not on file   Lifestyle     Physical activity     Days per week: Not on file     Minutes per session: Not on file      Stress: Not on file   Relationships     Social connections     Talks on phone: Not on file     Gets together: Not on file     Attends Faith service: Not on file     Active member of club or organization: Not on file     Attends meetings of clubs or organizations: Not on file     Relationship status: Not on file     Intimate partner violence     Fear of current or ex partner: Not on file     Emotionally abused: Not on file     Physically abused: Not on file     Forced sexual activity: Not on file   Other Topics Concern     Parent/sibling w/ CABG, MI or angioplasty before 65F 55M? Not Asked   Social History Narrative     Not on file           Allergies:   No known drug allergies      KAMILLA Espino [facility] - Heart Clinic  Pager: [phone]    cc:   No referring provider defined for this encounter.

## 2021-04-20 ENCOUNTER — DOCUMENTATION ONLY (OUTPATIENT)
Dept: CARDIOLOGY | Facility: CLINIC | Age: 81
End: 2021-04-20

## 2021-04-20 NOTE — PROGRESS NOTES
[facility] Clinic    Faxed BMP, NTproBNP, Hgb orders to [facility] (fax: [phone]) with request they be drawn on/about 7/12/21 with anticipation of cardiology follow-up 7/19/21.    Future Appointments   Date Time Provider Department Center   7/19/2021  1:30 PM Ame Mejía PA-C [facility] PSA CLIN     Debi Mills RN BSN   10:44 AM 04/20/21

## 2021-04-23 ENCOUNTER — CARE COORDINATION (OUTPATIENT)
Dept: CARDIOLOGY | Facility: CLINIC | Age: 81
End: 2021-04-23

## 2021-04-23 NOTE — PROGRESS NOTES
[facility] - CORE Clinic Telemanagement  My Health Tracker HF Alert     Weight today: 148#  Weight goal: 150-156#  MyHealth Tracker CHF Flowsheet My weight today is:   4/5/2021 153   4/7/2021 151   4/8/2021 153   4/9/2021 152   4/11/2021 152   4/12/2021 151   4/14/2021 149   4/15/2021 148   4/16/2021 149   4/17/2021 151   4/18/2021 150   4/21/2021 150   4/22/2021 149   4/23/2021 148     Heart Failure sx: SOB reported    Daily diuretic plan:   Lasix 40mg daily  Spironolactone 50mg daily    Notes: Weight hovering at lower end of goal, but stable. 2# below goal today, but SOB reported. Called pt, he reports he just had a little SOB this am getting out of bed, he gets that occasionally, no different than usual. He feels fine now without SOB. He denies swelling. He reports he saw his Nephrologist 4/21 and he thought he looked good. They did labs and he is awaiting those results. Checked Care Everywhere, but results pending for me as well. Asked pt to let me know once he gets results if any changes made base on results. Pt stated understanding. Will await full Nephrology note and lab results and then review with FORTUNATO Ortiz.     Dilma Rene RN, BSN, CHFN  04/23/21 at 12:14 PM      Future Appointments   Date Time Provider Department Center   7/19/2021  1:30 PM Ame Mejía PA-C [facility] PSA CLIN

## 2021-05-05 NOTE — PROGRESS NOTES
[facility] - CORE Clinic Telemanagement  My Health Tracker HF Alert     Weight today: 149#    Weight goal: 150-156#  MyHealth Tracker CHF Flowsheet My weight today is:   4/14/2021 149   4/15/2021 148   4/16/2021 149   4/17/2021 151   4/18/2021 150   4/21/2021 150   4/22/2021 149   4/23/2021 148   4/24/2021 149   4/25/2021 149   4/27/2021 150   4/29/2021 148   4/30/2021 148   5/2/2021 149   5/4/2021 149   5/5/2021 149     Heart Failure sx: none    Daily diuretic plan:   Lasix 40mg daily  Spironolactone 50mg daily    Notes: Pt continues to hover at low end and below goal parameters. Pt not reporting any symptoms. Pt saw Nephrology 4/21 and no changes were made. Dr. Chandler noted that Creatinine wavers between 1.2-1.9, pt had labs done that day and Creatinine was 1.57. Pt due to follow up with Nephrology in 4 months. Seeing Cardiology TAMAR back in July. Will send to FORTUNATO Ortiz to review parameters.     Dilma Rene RN, BSN, CHFN  05/05/21 at 2:18 PM     Future Appointments   Date Time Provider Department Center   7/19/2021  1:30 PM Ame Mejía PA-C [facility] PSA CLIN

## 2021-05-05 NOTE — PROGRESS NOTES
[facility] Heart-CORE Clinic  Wt parameters adjusted.     Dilma Rene RN, BSN, CHFN  05/05/21 at 3:07 PM

## 2021-05-17 ENCOUNTER — CARE COORDINATION (OUTPATIENT)
Dept: CARDIOLOGY | Facility: CLINIC | Age: 81
End: 2021-05-17

## 2021-05-17 NOTE — PROGRESS NOTES
[facility] - CORE Clinic Telemanagement  My Health Tracker HF Alert     Weight today: 150#  Weight goal: 145-152#  MyHealth Tracker CHF Flowsheet My weight today is:   5/4/2021 149   5/5/2021 149   5/6/2021 149   5/8/2021 150   5/10/2021 149   5/11/2021 148   5/13/2021 149   5/14/2021 150   5/15/2021 149   5/16/2021 150   5/17/2021 150     Heart Failure sx: SOB and dizziness reported    Daily diuretic plan:   Lasix 40mg daily  Spironolactone 50mg daily    Notes: Weights stable within goal, but pt reported SOB through the weekend and today. Called pt, he reports he has SOB every am when getting up out of bed first thing in the am, but the last few days he has noticed it last a while longer than usual. He rests for about 15 minutes or so, and then it goes away and he is fine the rest of the day without issues and denies SOB the rest of the day. Denies swelling or bloating. Denies increased dizziness. Occasional lightheadedness, but no more than usual. Told pt I would send update to FORTUNATO Ortiz, if changes will call back, otherwise, continue to monitor, if symptoms worsen, asked him to call. He stated understanding.     Sent to FORTUNATO Ortiz for review.     Future Appointments   Date Time Provider Department Center   7/19/2021  1:30 PM [facility]       Dilma Rene RN, BSN, CHFN  05/17/21 at 1:55 PM

## 2021-05-17 NOTE — PROGRESS NOTES
Let's have him increase his furosemide from 40 to 60 mg daily until this symptom returns to baseline. Thank you.

## 2021-05-18 NOTE — PROGRESS NOTES
[facility] Heart-CORE Clinic  Called pt, no answer. Left message with instructions to increase Lasix/Furosemide to 60mg daily until SOB returns to baseline, then go back to 40mg daily. Asked pt to return call to confirm instructions.     Dilma Rene RN, BSN, CHFN  05/18/21 at 9:02 AM

## 2021-05-19 NOTE — PROGRESS NOTES
[facility] Heart - CORE Clinic    Called patient to review instructions re:lasix increase. LM for call back.  Argelia Bacon RN on 5/19/2021 at 8:59 AM

## 2021-06-04 ENCOUNTER — CARE COORDINATION (OUTPATIENT)
Dept: CARDIOLOGY | Facility: CLINIC | Age: 81
End: 2021-06-04

## 2021-06-04 NOTE — PROGRESS NOTES
"[facility] Heart-CORE Clinic  My Health Tracker HF Alert    Today's home weight: 149#    Goal weight: 145 - 152#  Recent weights:  MyHealth Tracker CHF Flowsheet My weight today is:   5/22/2021 151   5/23/2021 149   5/24/2021 149   5/25/2021 148   5/27/2021 150   5/29/2021 150   5/31/2021 151   6/3/2021 149   6/4/2021 149       Heart Failure sx: Patient reporting SOB and dizziness today. He stated this occurs occasionally and occurred this am walking to the BR. He denied any orthopnea/PND, but stated when he walked to the BR this am he felt a bit SOB. However sx resolved within 1-2 min and has not recurred. He has been outside working without recurrence.     The dizziness occurs with rapid position change. He denied that it occurs at any other time or with any other activities. He then added, \"you can just ignore both those as I feel just fine now!\"  Will continue to closely monitor.    He then added that when recently seen by his PCP it was noted that he has some breast swelling and tenderness around the nipples. PCP is asking if the spironolactone could be decreased. Will forward to Ame Mejía for review. Patient verbalized understanding.   Current diuretic plan:    lasix 40mg/d   spironolactone 50mg/d  Future Appointments   Date Time Provider Department Center   7/19/2021  1:30 PM Ame Mejía PA-C [facility] PSA CLIN   Argelia Bacon RN on 6/4/2021 at 10:50 AM    "

## 2021-06-04 NOTE — PROGRESS NOTES
Sure, let's try cutting back the joslyn from 50 to 25 to see if his symptoms improve. We'll keep an eye on his weight trend. Please ask him to avoid working outside in this heat. Thanks.

## 2021-06-07 NOTE — PROGRESS NOTES
[facility] Heart - CORE Clinic    Called patient w/instructions to decrease spironolactone to 25mg/d to see if this helps minimize the breast tenderness. As he had not yet today sent in transmission to his MHT I asked him about that as well - he stated he would do that upon completion of our call:  MyHealth Tracker CHF Flowsheet My weight today is:   5/31/2021 151   6/3/2021 149   6/4/2021 149   6/5/2021 150   6/6/2021 149   6/7/2021 149     I reiterated the importance of doing this every day particularly now that we will be decreasing the spironolactone. Reminder sent to board for f/u call in 1-2 weeks to assess response. Patient verbalized understanding.  Argelia Bacon RN on 6/7/2021 at 3:13 PM

## 2021-06-08 ENCOUNTER — CARE COORDINATION (OUTPATIENT)
Dept: CARDIOLOGY | Facility: CLINIC | Age: 81
End: 2021-06-08

## 2021-06-08 DIAGNOSIS — I50.33 ACUTE ON CHRONIC HEART FAILURE WITH PRESERVED EJECTION FRACTION (HFPEF) (H): ICD-10-CM

## 2021-06-08 DIAGNOSIS — I50.32 CHRONIC DIASTOLIC HEART FAILURE (H): ICD-10-CM

## 2021-06-08 NOTE — PROGRESS NOTES
[facility] Heart-CORE Clinic    Tc called to report BP readings and symptoms.    6/5 91/47  6/6 103/61  6/7 108/65  6/8 93/54    He noted readings taken at different times during day.    Last several days he's experienced intermittent dizziness and lightheadedness which are new, worse with position changes. During that time he felt near-fainting x1 and episode passed quickly. He denied falls.     He thinks his appetite may be less than typical, having some mild constipation but denied other acute illness. His fluid intake seemed acceptable.     He noted his diuretic plan:    Torsemide 40 mg daily --->5/17 instructed to take 60 mg daily until SOB at baseline, he says he did this for ~ 1 week   Spironolactone 25 mg daily ---> decreased to this dose 6/4 due to breast tenderness    Additionally, he takes coreg 6.25 mg daily, losartan 50 mg twice daily, verapamil  mg at bedtime.     His weights have been stable.        PLAN:   --I advised him to hold his verapamil tonight  --We reviewed to call 911 if persistent dizziness/lightheadedness, feel like fainting, SBP persistently <90  --will ask Ame Mejía PAC to review and advise    Future Appointments   Date Time Provider Department Center   7/19/2021  1:30 PM Ame Mejía PA-C [facility] PSA STEPH Mills RN BSN   4:49 PM 06/08/21

## 2021-06-09 RX ORDER — LOSARTAN POTASSIUM 50 MG/1
50 TABLET ORAL DAILY
Qty: 90 TABLET | Refills: 3
Start: 2021-06-09 | End: 2021-06-15 | Stop reason: DRUGHIGH

## 2021-06-09 RX ORDER — SPIRONOLACTONE 25 MG/1
25 TABLET ORAL DAILY
Qty: 90 TABLET | Refills: 3
Start: 2021-06-09 | End: 2021-06-15 | Stop reason: SINTOL

## 2021-06-09 NOTE — PROGRESS NOTES
[facility] Heart-CORE Clinic    I called Tc to review Ame LEW's recommendations:    1. Decrease losartan to 50 mg daily   2. Have labs drawn in next ~week (BMP, CBC orders sent to PCP per his request); reminder to watch for results sent to RN board  3. Call CORE RN if BP/dizziness not improving, or 911 if feeling faint    Tc agreed to above plan.    Debi Mills RN BSN   3:19 PM 06/09/21

## 2021-06-15 ENCOUNTER — DOCUMENTATION ONLY (OUTPATIENT)
Dept: CARDIOLOGY | Facility: CLINIC | Age: 81
End: 2021-06-15

## 2021-06-15 ENCOUNTER — CARE COORDINATION (OUTPATIENT)
Dept: CARDIOLOGY | Facility: CLINIC | Age: 81
End: 2021-06-15

## 2021-06-15 DIAGNOSIS — I50.33 ACUTE ON CHRONIC HEART FAILURE WITH PRESERVED EJECTION FRACTION (HFPEF) (H): Primary | ICD-10-CM

## 2021-06-15 LAB
BUN SERPL-MCNC: 40 MG/DL (ref 8–27)
BUN/CREATININE RATIO: 25 (ref 10–24)
CALCIUM SERPL-MCNC: 9.8 MG/DL (ref 8.6–10.2)
CHLORIDE SERPLBLD-SCNC: 101 MMOL/L (ref 96–106)
CO2 SERPL-SCNC: 28 MMOL/L (ref 20–29)
CREAT SERPL-MCNC: 1.6 MG/DL (ref 0.76–1.27)
ERYTHROCYTE [DISTWIDTH] IN BLOOD BY AUTOMATED COUNT: 14.2 % (ref 11.6–15.4)
GFR SERPL CREATININE-BSD FRML MDRD: 40 ML/MIN/1.73M2
GLUCOSE SERPL-MCNC: 212 MG/DL (ref 70–99)
HCT VFR BLD AUTO: 36.3 % (ref 37.5–51)
HEMOGLOBIN: 11.7 G/DL (ref 13.3–17.7)
MCH RBC QN AUTO: 28.7 PG (ref 26.6–33)
MCHC RBC AUTO-ENTMCNC: 32.2 G/DL (ref 31.5–35.7)
MCV RBC AUTO: 89 FL (ref 79–97)
PLATELET # BLD AUTO: 297 10^9/L (ref 150–450)
POTASSIUM SERPL-SCNC: 6 MMOL/L (ref 3.5–5.2)
RBC # BLD AUTO: 4.08 10^12/L (ref 4.14–5.8)
SODIUM SERPL-SCNC: 138 MMOL/L (ref 134–144)
WBC # BLD AUTO: 10.4 10^9/L (ref 3.4–10.8)

## 2021-06-15 RX ORDER — LOSARTAN POTASSIUM 25 MG/1
25 TABLET ORAL DAILY
Qty: 90 TABLET | Refills: 3 | Status: SHIPPED | OUTPATIENT
Start: 2021-06-15 | End: 2022-06-10

## 2021-06-15 NOTE — PROGRESS NOTES
Stop [place]. Reduce Losartan to 25. Repeat BMP on Monday next week. Can he take one of my CORE new slots on Wednesday? Should bring med bottles in. If he's having any other symptoms, it may be a good idea for him to be seen by his PCP this week as I'm not sure why he isn't tolerating this regimen that he has in the past. Wonder if something else is going on. Thanks.

## 2021-06-15 NOTE — PROGRESS NOTES
"[facility] Heart - CORE Clinic    Called patient to review lab results and to remind him about daily calls into MHT. He stated he was at the \"doctor's office\" and would call us back. Argelia Bacon RN on 6/15/2021 at 12:38 PM    Called patient again to review his lab results from earlier today:  Component      Latest Ref Rng & Units 6/15/2021   Sodium      134 - 144 mmol/L 138   Potassium      3.5 - 5.2 mmol/L 6.0 (A)   Chloride      96 - 106 mmol/L 101   Carbon Dioxide      20 - 29 mmol/L 28   Glucose      70 - 99 mg/dL 212 (A)   Urea Nitrogen      8 - 27 mg/dL 40 (A)   Creatinine      0.76 - 1.27 mg/dL 1.60 (A)   Calcium      8.6 - 10.2 mg/dL 9.8   GFR Estimate      >59 ml/min/1.73m2 40 (A)   GFR Estimate If Black      >59 ml/min/1.73m2 46 (A)   BUN/Creatinine Ratio      10 - 24 25 (A)     In light of worsening kidney function and high K, reviewed recent med changes to confirm they were made:   Spironolactone decreased to 25mg/d   Losartan decreased to 50mg/d  Also confirmed patient not taking any supplemental potassium or eating high K foods.     CBC results reviewed as well. Hgb improved to 11.7.     Per MHT patients weight remains within paramtetrs. He continues to report dizziness but added this occurs w/rapid position change only. He denied any syncope or near syncope. His BP has been /70-80.     Will review above w/Ame Mejía. Patient verbalized understanding. Argelia Bacon RN on 6/15/2021 at 2:42 PM         "

## 2021-06-15 NOTE — PROGRESS NOTES
[facility] Heart - CORE Clinic    Called patient w/instructions per Ame Mejía:   1. Stop spironolactone   2. decrease losartan to 25mg every day   3.  Recheck BMP on Mon 6/21 - he will have this done at [facility]. Will fax order   4.  appt scheduled w/Ame Mejía:  Future Appointments   Date Time Provider Department Center   6/23/2021  2:10 PM Ame Mejía PA-C [facility] PSA CLIN   7/19/2021  1:30 PM Ame Mejía PA-C [facility] PSA CLIN     Patient verbalized understanding. Med list updated. Argelia Bacon, RN on 6/15/2021 at 3:11 PM

## 2021-06-21 ENCOUNTER — TRANSFERRED RECORDS (OUTPATIENT)
Dept: HEALTH INFORMATION MANAGEMENT | Facility: CLINIC | Age: 81
End: 2021-06-21

## 2021-06-21 LAB
ANION GAP SERPL CALCULATED.3IONS-SCNC: ABNORMAL MMOL/L
BUN SERPL-MCNC: 29 MG/DL (ref 8–27)
CALCIUM SERPL-MCNC: 9 MG/DL (ref 8.6–10.2)
CHLORIDE SERPLBLD-SCNC: 103 MMOL/L (ref 96–106)
CO2 SERPL-SCNC: 28 MMOL/L (ref 20–29)
CREAT SERPL-MCNC: 1.58 MG/DL (ref 0.76–1.27)
GFR SERPL CREATININE-BSD FRML MDRD: 40 ML/MIN/1.73M2
GLUCOSE SERPL-MCNC: 246 MG/DL (ref 65–99)
POTASSIUM SERPL-SCNC: 4.8 MMOL/L (ref 3.5–5.2)
SODIUM SERPL-SCNC: 139 MMOL/L (ref 134–144)

## 2021-06-23 ENCOUNTER — EXTERNAL ORDER RESULTS (OUTPATIENT)
Dept: CARDIOLOGY | Facility: CLINIC | Age: 81
End: 2021-06-23

## 2021-06-23 ENCOUNTER — OFFICE VISIT (OUTPATIENT)
Dept: CARDIOLOGY | Facility: CLINIC | Age: 81
End: 2021-06-23
Payer: COMMERCIAL

## 2021-06-23 ENCOUNTER — CARE COORDINATION (OUTPATIENT)
Dept: CARDIOLOGY | Facility: CLINIC | Age: 81
End: 2021-06-23

## 2021-06-23 VITALS — OXYGEN SATURATION: 100 % | HEIGHT: 67 IN | WEIGHT: 155.7 LBS | BODY MASS INDEX: 24.44 KG/M2

## 2021-06-23 DIAGNOSIS — I50.33 ACUTE ON CHRONIC HEART FAILURE WITH PRESERVED EJECTION FRACTION (HFPEF) (H): Primary | ICD-10-CM

## 2021-06-23 DIAGNOSIS — I50.32 CHRONIC DIASTOLIC HEART FAILURE (H): ICD-10-CM

## 2021-06-23 PROCEDURE — 99214 OFFICE O/P EST MOD 30 MIN: CPT | Performed by: NURSE PRACTITIONER

## 2021-06-23 ASSESSMENT — MIFFLIN-ST. JEOR: SCORE: 1364.88

## 2021-06-23 NOTE — LETTER
2021    Jessika Ratliff Adalid  [address]  [place] [address]    RE: Tc Garcia       Dear Colleague,    I had the pleasure of seeing Tc Garcia in the [facility].        CARDIOLOGY CLINIC / C.O.R.E. CLINIC VISIT  (Heart Failure Specialty)  DOS: 21    Tc Garcia  : 1940  MRN: [id number]    Reason for Visit:     History: cT Garcia is a pleasant 81 year old patient who presents today for a CORE clinic follow up visit.      The patient has a complex history of the following -   # Chronic HFpEF, with multiple admissions in . Enrolled in MHT with goal wt 150-156 lbs.  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 Anticoagulated with Eliquis.  # Poorly-controlled DMII  # HTN  # HLP  # CKD with variable renal function response to diuretics, follows with [facility] (Vidhi Galo). Baseline creat ~ 1.3.  # Anemia of chronic disease, requiring intermittent Fe infusions, followed by nephrology  # Obesity  # Hx of medication confusion and non-compliance, self-adjusts diuretics frequently.  # Social - Lives with wife, Radha. Sedentary. High sodium diet, medication noncompliance, some concern over cognitive function and medical literacy.    Review and summary of recent hospital admission:   Admitted in March for weakness in the setting of dehydration after tooth extraction.  His losartan, spironolactone, and furosemide were all briefly held, then slowly re-initiated.     More recently experiencing intermittent dizziness and lightheadedness which are new, worse with  position changes. During that time he felt near-fainting x1 and episode passed quickly. He denied falls.     6/15 K+ 6.0, spironolactone stopped, creat 1.60  6/21 K+ 4.8, creat 1.58    6/23/21 returns in follow up. No orthostatic BP today. Average /80. He states he is trying to stay hydrated.  Denies increase shortness of breath, PND, orthopneic. He states his dizziness if only on occation when he stands up to fast, otherwise he does not have it. I have reviewed and updated the patient's Past Medical History, Social History, Family History and Medication List.             CURRENT MEDICATIONS:  Current Outpatient Medications   Medication Sig Dispense Refill     apixaban ANTICOAGULANT (ELIQUIS) 5 MG tablet Take 1 tablet (5 mg) by mouth 2 times daily 60 tablet 5     carvedilol (COREG) 6.25 MG tablet Take 6.25 mg by mouth daily  180 tablet 1     furosemide (LASIX) 40 MG tablet Take 1 tablet (40 mg) by mouth daily 90 tablet 3     glucagon 1 MG kit 1 mg as needed for low blood sugar       HYDROcodone-acetaminophen (NORCO) 5-325 MG tablet Take 1 tablet by mouth every 6 hours as needed for severe pain       losartan (COZAAR) 25 MG tablet Take 1 tablet (25 mg) by mouth daily 90 tablet 3     LOVASTATIN 20 MG PO TABS 1 TABLET DAILY AT DINNER 90 Tab 0     nystatin (MYCOSTATIN) 775999 UNIT/GM external cream Apply topically 2 times daily as needed        verapamil ER (VERELAN) 120 MG 24 hr capsule Take 1 capsule (120 mg) by mouth At Bedtime 90 capsule 1       ALLERGIES     Allergies   Allergen Reactions     No Known Drug Allergies      ROS:  12-pt ROS is negative except for as noted above.      PHYSICAL EXAMINATION:  Vitals: BP supine 125/83, sitting 129/70, standing 110/75, HR 76 bpm weight 155 pounds.   Constitutional:  Patient is pleasant, alert, cooperative, and in NAD.   HEENT:  NCAT. PERRLA. EOM's intact.   Neck: no JVD   Pulmonary: clear  Cardiac:Irregularly irregular, variable S1, no S3/S4, no murmur or rub    Abdomen:  Soft, non-tender abdomen, no hepatosplenomegaly appreciated.   Extremities:  no edema   Neurological:  No gross motor or sensory deficits.   Psych: Appropriate affect.      DIAGNOSTIC STUDIES:  Recent Lab Results:    BMP RESULTS:Results for CEDRICK PHILLIPS (MRN [id number]) as of 6/23/2021 14:18   Ref. Range 6/21/2021 00:00   Sodium Latest Ref Range: 134 - 144 mmol/L 139   Potassium Latest Ref Range: 3.5 - 5.2 mmol/L 4.8   Chloride Latest Ref Range: 96 - 106 mmol/L 103   Carbon Dioxide Latest Ref Range: 20 - 29 mmol/L 28   Urea Nitrogen Latest Ref Range: 8 - 27 mg/dL 29 (H)   Creatinine Latest Ref Range: 0.76 - 1.27 mg/dL 1.58 (H)   GFR Estimate Latest Ref Range: >60 ml/min/1.73m2 40 (L)   GFR Estimate If Black Latest Ref Range: >60 ml/min/1.73m2 46 (L)   Calcium Latest Ref Range: 8.6 - 10.2 mg/dL 9.0         Lab Results   Component Value Date    CHOL 116 05/13/2020    HDL 60 05/13/2020    LDL 43 05/13/2020    TRIG 65 05/13/2020    CHOLHDLRATIO 2.7 03/09/2009          ASSESMENT /PLAN  Chronic HFpEF, with multiple admissions in 2020. Enrolled in MHT with goal wt 150-156 lbs.  Recently complained of dizziness on his answer on MHT. He tells me its only when he stands up suddenly. Only on occation. Losartan is at 25mg daily now and spironolactone has been discontinued. No orthostatic BP's today. Recent creat improved.        RECOMMENDATIONS:  1. Continue MHT ( In his remote monitoring (MHT) he answered yes to dizziness and it alerted the nurse.   Today, we clarified the dizziness.  Going forward he will not say yes to dizziness unless its different as described above)    Currently on a lower dose of losartan and spironolactone has been discontinued. Potassium level is back to baseline. In terms of lasix dose, he is on 40mg daily.   He is doing well.     Follow-up:   1. Bmp at PCP 7/12 ( asked CORE RN to fax order)   2. See Annette Mejía 7/19     Thank you for the opportunity to be involved in this very pleasant  patient's care please feel to contact me with any questions.    Total time: 42 minutes was spent today reviewing the chart, visiting the patient, and documenting the visit.      Jeannie NELSON CNP   [facility]    cc:   No referring provider defined for this encounter.

## 2021-06-23 NOTE — PATIENT INSTRUCTIONS
Call C.O.R.E. nurse for any questions or concerns Mon-Fri 8am-4pm:                                                  [phone]                                       For concerns after hours:                                                 [phone]     Medication changes: no changes    Plan from today:   1. Call your primary doctors office to schedule a lab on 7/12, ( order will be sent )   2. See Annette Mejía 7/19 at 1:30 pm       Lab results: see attached;   Results for CEDRICK PHILILPS (MRN [id number]) as of 6/23/2021 13:35   Ref. Range 6/21/2021 00:00   Sodium Latest Ref Range: 134 - 144 mmol/L 139   Potassium Latest Ref Range: 3.5 - 5.2 mmol/L 4.8   Chloride Latest Ref Range: 96 - 106 mmol/L 103   Carbon Dioxide Latest Ref Range: 20 - 29 mmol/L 28   Urea Nitrogen Latest Ref Range: 8 - 27 mg/dL 29 (H)   Creatinine Latest Ref Range: 0.76 - 1.27 mg/dL 1.58 (H)   GFR Estimate Latest Ref Range: >60 ml/min/1.73m2 40 (L)   GFR Estimate If Black Latest Ref Range: >60 ml/min/1.73m2 46 (L)   Calcium Latest Ref Range: 8.6 - 10.2 mg/dL 9.0   Glucose Latest Ref Range: 65 - 99 mg/dL 246 (H)

## 2021-06-23 NOTE — PROGRESS NOTES
CARDIOLOGY CLINIC / C.O.R.E. CLINIC VISIT  (Heart Failure Specialty)  DOS: 21    Tc Garcia  : 1940  MRN: [id number]    Reason for Visit:     History: Tc Garcia is a pleasant 81 year old patient who presents today for a CORE clinic follow up visit.      The patient has a complex history of the following -   # Chronic HFpEF, with multiple admissions in . Enrolled in MHT with goal wt 150-156 lbs.  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 Anticoagulated with Eliquis.  # Poorly-controlled DMII  # HTN  # HLP  # CKD with variable renal function response to diuretics, follows with [facility] (Vidhi Galo). Baseline creat ~ 1.3.  # Anemia of chronic disease, requiring intermittent Fe infusions, followed by nephrology  # Obesity  # Hx of medication confusion and non-compliance, self-adjusts diuretics frequently.  # Social - Lives with wife, Radha. Sedentary. High sodium diet, medication noncompliance, some concern over cognitive function and medical literacy.    Review and summary of recent hospital admission:   Admitted in March for weakness in the setting of dehydration after tooth extraction.  His losartan, spironolactone, and furosemide were all briefly held, then slowly re-initiated.     More recently experiencing intermittent dizziness and lightheadedness which are new, worse with position changes. During that time he felt near-fainting x1 and episode passed quickly. He denied falls.     6/15 K+ 6.0, spironolactone stopped, creat 1.60   K+ 4.8, creat 1.58    21 returns in follow up. No orthostatic BP today. Average  /80. He states he is trying to stay hydrated.  Denies increase shortness of breath, PND, orthopneic. He states his dizziness if only on occation when he stands up to fast, otherwise he does not have it. I have reviewed and updated the patient's Past Medical History, Social History, Family History and Medication List.             CURRENT MEDICATIONS:  Current Outpatient Medications   Medication Sig Dispense Refill     apixaban ANTICOAGULANT (ELIQUIS) 5 MG tablet Take 1 tablet (5 mg) by mouth 2 times daily 60 tablet 5     carvedilol (COREG) 6.25 MG tablet Take 6.25 mg by mouth daily  180 tablet 1     furosemide (LASIX) 40 MG tablet Take 1 tablet (40 mg) by mouth daily 90 tablet 3     glucagon 1 MG kit 1 mg as needed for low blood sugar       HYDROcodone-acetaminophen (NORCO) 5-325 MG tablet Take 1 tablet by mouth every 6 hours as needed for severe pain       losartan (COZAAR) 25 MG tablet Take 1 tablet (25 mg) by mouth daily 90 tablet 3     LOVASTATIN 20 MG PO TABS 1 TABLET DAILY AT DINNER 90 Tab 0     nystatin (MYCOSTATIN) 311648 UNIT/GM external cream Apply topically 2 times daily as needed        verapamil ER (VERELAN) 120 MG 24 hr capsule Take 1 capsule (120 mg) by mouth At Bedtime 90 capsule 1       ALLERGIES     Allergies   Allergen Reactions     No Known Drug Allergies      ROS:  12-pt ROS is negative except for as noted above.      PHYSICAL EXAMINATION:  Vitals: BP supine 125/83, sitting 129/70, standing 110/75, HR 76 bpm weight 155 pounds.   Constitutional:  Patient is pleasant, alert, cooperative, and in NAD.   HEENT:  NCAT. PERRLA. EOM's intact.   Neck: no JVD   Pulmonary: clear  Cardiac:Irregularly irregular, variable S1, no S3/S4, no murmur or rub   Abdomen:  Soft, non-tender abdomen, no hepatosplenomegaly appreciated.   Extremities:  no edema   Neurological:  No gross motor or sensory deficits.   Psych: Appropriate affect.      DIAGNOSTIC STUDIES:  Recent Lab Results:    BMP RESULTS:Results for  CEDRICK PHILLIPS (MRN [id number]) as of 6/23/2021 14:18   Ref. Range 6/21/2021 00:00   Sodium Latest Ref Range: 134 - 144 mmol/L 139   Potassium Latest Ref Range: 3.5 - 5.2 mmol/L 4.8   Chloride Latest Ref Range: 96 - 106 mmol/L 103   Carbon Dioxide Latest Ref Range: 20 - 29 mmol/L 28   Urea Nitrogen Latest Ref Range: 8 - 27 mg/dL 29 (H)   Creatinine Latest Ref Range: 0.76 - 1.27 mg/dL 1.58 (H)   GFR Estimate Latest Ref Range: >60 ml/min/1.73m2 40 (L)   GFR Estimate If Black Latest Ref Range: >60 ml/min/1.73m2 46 (L)   Calcium Latest Ref Range: 8.6 - 10.2 mg/dL 9.0         Lab Results   Component Value Date    CHOL 116 05/13/2020    HDL 60 05/13/2020    LDL 43 05/13/2020    TRIG 65 05/13/2020    CHOLHDLRATIO 2.7 03/09/2009          ASSESMENT /PLAN  Chronic HFpEF, with multiple admissions in 2020. Enrolled in MHT with goal wt 150-156 lbs.  Recently complained of dizziness on his answer on MHT. He tells me its only when he stands up suddenly. Only on occation. Losartan is at 25mg daily now and spironolactone has been discontinued. No orthostatic BP's today. Recent creat improved.        RECOMMENDATIONS:  1. Continue MHT ( In his remote monitoring (MHT) he answered yes to dizziness and it alerted the nurse.   Today, we clarified the dizziness.  Going forward he will not say yes to dizziness unless its different as described above)    Currently on a lower dose of losartan and spironolactone has been discontinued. Potassium level is back to baseline. In terms of lasix dose, he is on 40mg daily.   He is doing well.     Follow-up:   1. Bmp at PCP 7/12 ( asked CORE RN to fax order)   2. See Annette Mejía 7/19     Thank you for the opportunity to be involved in this very pleasant patient's care please feel to contact me with any questions.    Total time: 42 minutes was spent today reviewing the chart, visiting the patient, and documenting the visit.      Jeannie NELSON, [facility]

## 2021-06-23 NOTE — PROGRESS NOTES
[facility] Heart - CORE Clinic    Incoming fax w/latest BMP results. Patient to be seen today(Wed 6/23).  Argelia Bacon RN on 6/23/2021 at 8:30 AM

## 2021-06-23 NOTE — PROGRESS NOTES
[facility] Heart-CORE Clinic  MyHealth Tracker HF     Telemanagement for review at visit today with Robinson Henriquez CNP. Goal wt parameters and PRN diuretic plan to be addressed at today's visit.     Current goal weight: 145-152 lbs  Recent telemanagement data:   MyHealth Tracker CHF Flowsheet My weight today is:   6/5/2021 150   6/6/2021 149   6/7/2021 149   6/8/2021 148   6/9/2021 150   6/11/2021 148   6/12/2021 148   6/13/2021 150   6/15/2021 150   6/16/2021 148   6/17/2021 150   6/18/2021 150   6/19/2021 152   6/21/2021 152   6/22/2021 152         Debi Mills RN BSN   11:25 AM 06/23/21

## 2021-06-28 ENCOUNTER — CARE COORDINATION (OUTPATIENT)
Dept: CARDIOLOGY | Facility: CLINIC | Age: 81
End: 2021-06-28

## 2021-06-28 NOTE — PROGRESS NOTES
[facility] Heart-CORE Clinic  My Health Tracker HF Alert     Background:   6/15 K+ 6.0, spironolactone stopped, creat 1.60  6/21 K+ 4.8, creat 1.58  6/23 CORE follow-up with Robinson Henriquez CNP, reported positional dizziness only if stands too fast; denied HF symptoms, /80, home weight 153#--->no changes made    Weight 1-3# above goal last week. Called to assess--left VM requesting call back. Debi Mills RN BSN   2:45 PM 06/28/21    I spoke to Tc, he told me he feels great, glad to share his breathing is comfortable, able to do yard work, no swelling and dizziness has resolved. I instructed him to continue as is and contact us prior to follow-up next month with concerns. Will ask Ame Mejía is we should adjust weight range.      Today's home weight: 155  Goal weight: 145-152  Recent weights:  MyHealth Tracker CHF Flowsheet My weight today is:   6/21/2021 152   6/22/2021 152   6/23/2021 153   6/24/2021 153   6/25/2021 153   6/26/2021 154   6/27/2021 154   6/28/2021 155     Heart Failure sx: none reported  Current diuretic plan    Lasix 40 mg daily    Future Appointments   Date Time Provider Department Center   7/19/2021  1:30 PM Ame Mejía PA-C [facility] PSA CLIN     Debi Mills RN BSN   1:57 PM 06/29/21

## 2021-06-29 NOTE — PROGRESS NOTES
[facility] Heart-CORE Clinic    Adjusted weight range in My Health Tracker per Ame Mejía PAC.    Debi Mills RN BSN   4:13 PM 06/29/21

## 2021-07-04 ENCOUNTER — HEALTH MAINTENANCE LETTER (OUTPATIENT)
Age: 81
End: 2021-07-04

## 2021-07-06 ENCOUNTER — CARE COORDINATION (OUTPATIENT)
Dept: CARDIOLOGY | Facility: CLINIC | Age: 81
End: 2021-07-06

## 2021-07-06 DIAGNOSIS — I50.33 ACUTE ON CHRONIC HEART FAILURE WITH PRESERVED EJECTION FRACTION (HFPEF) (H): Primary | ICD-10-CM

## 2021-07-06 RX ORDER — FUROSEMIDE 40 MG
TABLET ORAL
Qty: 135 TABLET | Refills: 3 | Status: SHIPPED | OUTPATIENT
Start: 2021-07-06 | End: 2022-01-19

## 2021-07-06 RX ORDER — FUROSEMIDE 40 MG
TABLET ORAL
Qty: 135 TABLET | Refills: 3 | Status: SHIPPED | OUTPATIENT
Start: 2021-07-06 | End: 2021-07-06

## 2021-07-06 NOTE — PROGRESS NOTES
[facility] Heart- CORE Clinic    Called patient w/instructions to increase lasix to 60mg/d. He will have labs done at his PCP on 7/12 - reminder sent to board to fax order. Med list updated and Rx sent to pharmacy. Patient verbalized understanding.  Argelia Bacon RN on 7/6/2021 at 12:25 PM

## 2021-07-06 NOTE — PROGRESS NOTES
"[facility] Heart-CORE Clinic  My Health Tracker HF Alert     Today's home weight: 160#    Goal weight: 150 - 156#  Recent weights:  MyHealth Tracker CHF Flowsheet My weight today is:   6/29/2021 155   6/30/2021 155   7/1/2021 156   7/2/2021 160   7/3/2021 160   7/4/2021 160   7/5/2021 161   7/6/2021 160     Patient denied any change to his eating habits, different scale, other changes to explain weight gain.     Heart Failure sx: Patient today reported SOB - otherwise he denied any sx. He stated he noticed that today he became SOB sooner doing his usual activities. He would stop and sensation resolved in 1-2 minutes. He denied orthopnea/PND adding that he has been sleeping well.     He denied any LE swelling and any sensation of abdominal bloating. He stated,\"I gained the weight in my face.\"  He does not feel that his face looks puffy, \"its just that I don't look so gaunt.\"      Current diuretic plan:   lasix 40mg/d  Future Appointments   Date Time Provider Department Center   7/19/2021  1:30 PM Ame Mejía PA-C [facility] PSA CLIN     "

## 2021-07-06 NOTE — PROGRESS NOTES
Not surprised with this after cessation of Abraham. Please have him increase Lasix to 60 mg daily. Labs prior to visit with me. Thanks.

## 2021-07-07 ENCOUNTER — TELEPHONE (OUTPATIENT)
Dept: CARDIOLOGY | Facility: CLINIC | Age: 81
End: 2021-07-07

## 2021-07-07 NOTE — TELEPHONE ENCOUNTER
[facility] - [facility]ardo[facility] Clinic    BMP order faxed to [facility] (fax: [phone]) to be drawn on 7/12/21 per Robinson Henriquez CNP prior to their 7/19/21 appt w/ Ame Mejía PA-C.  Requested results be faxed back to [place] office at [phone]    Future Appointments   Date Time Provider Department Center   7/19/2021  1:30 PM [facility]       Annette Mills RN BSN  [facility] [place] [place]  C.O.[facility]WA[facility]. Clinic Care Coordinator  07/07/21, 11:48 AM     [Negative] : Heme/Lymph

## 2021-07-07 NOTE — TELEPHONE ENCOUNTER
----- Message from Annette Mills RN sent at 6/24/2021  1:50 PM CDT -----  Regarding: FW: LAB ORDER  Do this week of 7/6/21  ----- Message -----  From: Jeannie Henriquez APRN CNP  Sent: 6/23/2021   1:42 PM CDT  To: Annette Mills RN  Subject: LAB ORDER                                        S  [facility]

## 2021-07-07 NOTE — TELEPHONE ENCOUNTER
----- Message from Annette Mills RN sent at 6/24/2021  1:50 PM CDT -----  Regarding: FW: LAB ORDER  Do this week of 7/6/21  ----- Message -----  From: Jeannie Henriquez APRN CNP  Sent: 6/23/2021   1:42 PM CDT  To: Annette Mills RN  Subject: LAB ORDER                                        S  [facility]

## 2021-07-09 NOTE — TELEPHONE ENCOUNTER
Tc left voice mail asking to come in to clinic for his lab. Called him back and left voice mail that Annette Mills, AUGUSTIN faxed an order for his lab to his P[facility] on 7/7 and he needs to call his P[facility] to make a lab only appt.     Future Appointments   Date Time Provider Department Center   7/19/2021  1:30 PM [facility]         Oanh Blandon, RN 4:40 PM 07/09/21

## 2021-07-12 LAB
BUN SERPL-MCNC: 18 MG/DL (ref 8–27)
BUN/CREATININE RATIO: 10 (ref 10–24)
CALCIUM SERPL-MCNC: 9.3 MG/DL (ref 8.6–10.2)
CHLORIDE SERPLBLD-SCNC: 100 MMOL/L (ref 96–106)
CO2 SERPL-SCNC: 26 MMOL/L (ref 20–29)
CREAT SERPL-MCNC: 1.73 MG/DL (ref 0.76–1.27)
GFR IF AFRICAN AMERICAN - HISTORICAL: 42
GFR IF NOT AFRICAN AMERICAN - HISTORICAL: 36
GLUCOSE SERPL-MCNC: 218 MG/DL (ref 70–99)
POTASSIUM SERPL-SCNC: 4 MMOL/L (ref 3.5–5.2)
SODIUM SERPL-SCNC: 141 MMOL/L (ref 134–144)

## 2021-07-13 ENCOUNTER — DOCUMENTATION ONLY (OUTPATIENT)
Dept: CARDIOLOGY | Facility: CLINIC | Age: 81
End: 2021-07-13

## 2021-07-14 ENCOUNTER — TELEPHONE (OUTPATIENT)
Dept: CARDIOLOGY | Facility: CLINIC | Age: 81
End: 2021-07-14

## 2021-07-14 NOTE — TELEPHONE ENCOUNTER
[facility]      Noted Vladimir weight has been above goal of 150-156 lbs. Reached out to see how he was doing. Tc denies any symptoms at this time. Will check back to see how his weight does tomorrow. Of note pt has an appt next week with VIPIN Almeida.    Future Appointments   Date Time Provider Department Center   7/19/2021  1:30 PM [facility]     Maile Owen RN on 7/14/2021 at 2:59 PM

## 2021-07-19 ENCOUNTER — OFFICE VISIT (OUTPATIENT)
Dept: CARDIOLOGY | Facility: CLINIC | Age: 81
End: 2021-07-19
Payer: COMMERCIAL

## 2021-07-19 ENCOUNTER — DOCUMENTATION ONLY (OUTPATIENT)
Dept: CARDIOLOGY | Facility: CLINIC | Age: 81
End: 2021-07-19

## 2021-07-19 VITALS
HEIGHT: 69 IN | DIASTOLIC BLOOD PRESSURE: 72 MMHG | OXYGEN SATURATION: 100 % | SYSTOLIC BLOOD PRESSURE: 127 MMHG | BODY MASS INDEX: 23.99 KG/M2 | WEIGHT: 162 LBS | HEART RATE: 57 BPM

## 2021-07-19 DIAGNOSIS — I50.32 CHRONIC HEART FAILURE WITH PRESERVED EJECTION FRACTION (HFPEF) (H): ICD-10-CM

## 2021-07-19 PROCEDURE — 99214 OFFICE O/P EST MOD 30 MIN: CPT | Performed by: PHYSICIAN ASSISTANT

## 2021-07-19 ASSESSMENT — MIFFLIN-ST. JEOR: SCORE: 1425.21

## 2021-07-19 NOTE — LETTER
7/19/2021    Jessika Ratliff Adalid  [address]  [facility] [address]    RE: Tc Garcia       Dear Colleague,    I had the pleasure of seeing Tc Garcia in the [facility].      Cardiology Clinic Progress Note    Tc Garcia MRN# [id number]   YOB: 1940 Age: 81 year old   Primary cardiologist: Dr. Julien         Assessment and Plan:     In summary, Tc Garcia presents to the CORE clinic for follow up on chronic HFpEF. He appears well-compensated albeit with a worsening renal function, despite cessation of spironolactone last month, and a well-controlled blood pressure.     Plan:  - Increase goal my health tracker weight to 155-160 pounds.  - He sees nephrology in a couple weeks. Will ask them to weigh in on his worsening CKD. I will make no changes to his medications in the interim.     Follow-up:  - I will see him back in 3 months in the CORE clinic. We will continue to monitor him with [facility] in the meantime.   - Dr. Julien in 6 months.         History of Presenting Illness:      Tc Garcia is a pleasant 81 year old patient who presents today for a CORE clinic follow up visit.     The patient has a complex history of the following -   # Chronic HFpEF, with multiple admissions in 2020. Enrolled in [facility] with goal wt 150-156 lbs.  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 Anticoagulated with Eliquis.  # Poorly-controlled DMII  # HTN  # HLP  # CKD with variable renal function response to diuretics, follows with [facility] (Vidhi Galo). Baseline creat ~ 1.3.  # Anemia  of chronic disease, requiring intermittent Fe infusions, followed by nephrology  # Obesity  # Hx of medication confusion and non-compliance, self-adjusts diuretics frequently.  # Social - Lives with wife, Radha. Sedentary. High sodium diet, medication noncompliance, some concern over cognitive function and medical literacy.     Recent admissions:  - Beginning of May 2020 with progressive dyspnea and due to recurrent (transudative) pleural effusion a left PleurX catheter was placed. He was diuresed with IV furosemide and given empiric abx therapy. Echo showed normal LVEF 55-60% with normal wall motion. The RV was moderately dilated with decreased systolic function and mild TR. He underwent a RHC during that stay that showed mild PH (PA pressure of 28 mmHg (51/15), mean RA of 4mmHg, and mildly elevated filling pressures with a wedge of 12mmHg (Vwave 16). PVR was 3.9 Wood units and Carlos Manuel CO/CI was 4.09/2.23. With vasodilator challenge, his PVR normalized to 1.22 wood units. Therefore, he was deemed to have pulmonary hypertension out of proportion to his left heart disease. He was started on Cialis 5mg daily but because this was not a formulated preparation, was switched to sildenafil 20 mg 3 times daily and discharged home. Interestingly, he was not discharged on any diuretics. Discharge wt was 152 lbs. His admission was complicated by atrial flutter for which he was given amiodarone but because of prolongation in QTc, this was discontinued, and he was continued on his PTA verapamil.    Following discharge he developed symptomatic hypotension and via phone was told to stop the sildenafil. He then saw Dr. Chad gusman for PH evaluation a few days later. He reported hypertension with SBP >200mmHg and weight was up 12 lbs from discharge. Torsemide 10 mg daily, Losartan 25 mg daily, and KCL 40 mEq were all started. She recommended resuming sildenafil once he was felt to be euvolemic.    - Readmitted from 5/21 to 5/29 with  "uncontrolled diabetes and recurrent acute HFpEF, related to medication noncompliance. He again required IV lasix and was discharged on Lasix 40mg BID in place of the torsemide. Due to renal concerns, his hydrochlorothiazide and losartan were held. Fortunately, home health was arranged to help him manage his medications at home.   - 7/8-7/10/20 for re-accumulating pleural effusion, at which time IR evaluated his pleurex and was able to place to suction with 550ml fluid removed. He was also diuresed with good result.   - 7/10-7/18/20 for weakness/fall resulting in head contusion. He was found to have an empyema and received antibiotics. His pleurex was removed and a chest tube was placed. CT drained \"nearly all the fluid\" and was removed day prior to discharge.  ____________________________________    Since the admissions in 2020, I've been following Tc closely in [facility] and enrolled him in the T system. His weights and symptoms have fluctuated (typically related to diet, uncontrolled blood pressures and self-dosing diuretics), and most recently he had a bit of a prolonged recovery following abdominal hernia repair, but I'm happy to say he hasn't been readmitted with heart failure since the summer. His recent weight goal has been 150-156 lbs.    When I saw him last beginning of February, he appeared euvolemic and normotensive at a weight of 153 pounds on furosemide 40 mg daily.  Increase his Eliquis dose from 2.5 twice daily to 5 mg twice daily, given an improved and stable creatinine less than 1.5 in the recent many months.     At the beginning of March, Tc had 9 teeth extracted. This resulted in decreased oral intake and a fair amount of gum bleeding for the following week, and he was unfortunately admitted March 17 for weakness & near-syncope in the setting of dehydration. ECHO showed an EF of 55-60%, just mild PHTN and mild TR. IVF was administered, and his losartan, Lasix, and spironolactone were all held.  " "At discharge two days later, Lasix was restarted at 40 mg every other day and losartan was restarted at half his PTA dose.  He has since seen his PCP who has slowly placed him back on his PTA regimen, save for his Losartan which has been kept at 50 mg daily as opposed to BID. His creatinine as of April 5 was back to baseline at 1.2, with a BUN of 16, and potassium of 4.8.    When I saw Tc in April, he was doing better. His intake was back to normal. He was not limited by dyspnea, and didn't think he needs pulmonary rehab (previously referred).      In mid June, spironolactone was discontinued when his potassium level marina to 6.  After that, it came back down to 4.8.    Today, Tc returns to clinic stating he feels very well, overall. He denies chest pain, orthopnea, edema, claudication, palpitations, recurrent near syncope or syncope. His weights have been above goal range of 158-156, since beginning of this month.  They have been stable between 157 and 160 pounds. He tells me he is glad to have gained some weight. A PT has been helping with his chronic back pain, so he's been a little more active lately and feels good with that. He isn't using NSAIDs. Not limited by dyspnea, no abdominal bloating. No issues with bleeding. Intake is good. BP is well-controlled in clinic.  Creatinine is up a little at 1.7. BUN is WNL. He sees neph in two weeks.         Review of Systems:     12-pt ROS is negative except for as noted in the HPI.          Physical Exam:     Vitals: /72 (BP Location: Left arm, Cuff Size: Adult Regular)   Pulse 57   Ht 1.753 m (5' 9\")   Wt 73.5 kg (162 lb)   BMI 23.92 kg/m    Wt Readings from Last 10 Encounters:   07/19/21 73.5 kg (162 lb)   06/23/21 70.6 kg (155 lb 11.2 oz)   04/19/21 71.3 kg (157 lb 3.2 oz)   03/19/21 69 kg (152 lb 1.6 oz)   12/09/20 70.1 kg (154 lb 8 oz)   11/20/20 68 kg (150 lb)   11/16/20 69.1 kg (152 lb 4.8 oz)   10/27/20 68.9 kg (151 lb 14.4 oz)   10/01/20 68 kg (150 " lb)   09/02/20 68.3 kg (150 lb 8 oz)       Constitutional:  Patient is pleasant, alert, cooperative, and in NAD.  HEENT:  NCAT. PERRLA. EOM's intact.   Neck:  CVP appears normal. No carotid bruits.   Pulmonary: Normal respiratory effort. CTAB.   Cardiac: Irregularly irregular, variable S1, no S3/S4, no murmur or rub.   Abdomen:  Non-tender abdomen, no hepatosplenomegaly appreciated.   Vascular: Pulses in the upper and lower extremities are 2+ and equal bilaterally.  Extremities: No edema, erythema, cyanosis or tenderness appreciated.  Skin:  No rashes or lesions appreciated.   Neurological:  No gross motor or sensory deficits.   Psych: Appropriate affect.        Data:     Labs reviewed:  Recent Labs   Lab Test 03/17/21  0752 02/09/21  1306 12/01/20  1434 09/10/20  1359 08/10/20  1027 05/13/20  0553 05/12/20  0541 05/11/20  0542 04/25/20  1228   LDL  --   --   --   --   --  43  --   --   --    HDL  --   --   --   --   --  60  --   --   --    NHDL  --   --   --   --   --  56  --   --   --    CHOL  --   --   --   --   --  116  --   --   --    TRIG  --   --   --   --   --  65  --   --   --    TSH 0.94  --   --   --  4.75*  --  3.47  --   --    NTBNP  --  3,149* 3,052* 2,611*  --   --   --   --    < >   IRON  --   --   --   --   --   --   --  16*  --    FEB  --   --   --   --   --   --   --  161*  --    IRONSAT  --   --   --   --   --   --   --  10*  --    ERNESTO  --   --   --   --   --   --   --  142  --     < > = values in this interval not displayed.       Lab Results   Component Value Date    WBC 10.4 06/15/2021    RBC 4.08 (A) 06/15/2021    HGB 11.7 (A) 06/15/2021    HCT 36.3 (A) 06/15/2021    MCV 89 06/15/2021    MCH 28.7 06/15/2021    MCHC 32.2 06/15/2021    RDW 14.2 06/15/2021     06/15/2021       Lab Results   Component Value Date     07/12/2021    POTASSIUM 4.0 07/12/2021    CHLORIDE 100 07/12/2021    CO2 26 07/12/2021    ANIONGAP 4 03/18/2021     (A) 07/12/2021    BUN 18 07/12/2021    BUN 10  07/12/2021    CR 1.73 (A) 07/12/2021    GFRESTIMATED 40 (L) 06/21/2021    GFRESTBLACK 46 (L) 06/21/2021    LLOYD 9.3 07/12/2021      Lab Results   Component Value Date    AST 12 03/22/2021    ALT 5 03/22/2021       Lab Results   Component Value Date    A1C 7.7 (H) 11/20/2020       Lab Results   Component Value Date    INR 1.50 (H) 11/21/2020    INR 1.18 (H) 07/17/2020           Problem List:     Patient Active Problem List   Diagnosis     DM type 2, Hgb A1C 8.2 on 4/25/20     Mixed hyperlipidemia     Essential hypertension     Pain in limb     Plantar fascial fibromatosis     HYPERLIPIDEMIA LDL GOAL <100     Hemothorax on left     Recurrent Left Pleural effusion -- S/P Pleurex Cath 5/12/20     ABBIE (acute kidney injury) (H)     Acute respiratory failure with hypoxia (H)     Pulmonary hypertension (H)     CRF (chronic renal failure), stage 3      Anemia of chronic disease     Atrial fibrillation/flutter -- on Eliquis and Amiodarone     DKA (diabetic ketoacidoses) (H)     Anemia in CKD (chronic kidney disease)     Dyspnea     SOB (shortness of breath)     Non-recurrent bilateral inguinal hernia without obstruction or gangrene     Acute cholecystitis     Abdominal pain, generalized     Non-intractable vomiting with nausea, unspecified vomiting type     Alcohol dependence (H)     CHF (congestive heart failure) (H)     Syncope and collapse     Weakness     Postoperative state     Acute kidney injury (H)     Chronic diastolic heart failure (H)           Medications:     Current Outpatient Medications   Medication Sig Dispense Refill     apixaban ANTICOAGULANT (ELIQUIS) 5 MG tablet Take 1 tablet (5 mg) by mouth 2 times daily 60 tablet 5     carvedilol (COREG) 6.25 MG tablet Take 6.25 mg by mouth daily  180 tablet 1     furosemide (LASIX) 40 MG tablet Take 1 1/2 tablets(60mg) by mouth every day 135 tablet 3     glucagon 1 MG kit 1 mg as needed for low blood sugar       HYDROcodone-acetaminophen (NORCO) 5-325 MG tablet Take 1  tablet by mouth every 6 hours as needed for severe pain       losartan (COZAAR) 25 MG tablet Take 1 tablet (25 mg) by mouth daily 90 tablet 3     LOVASTATIN 20 MG PO TABS 1 TABLET DAILY AT DINNER 90 Tab 0     nystatin (MYCOSTATIN) 968594 UNIT/GM external cream Apply topically 2 times daily as needed        verapamil ER (VERELAN) 120 MG 24 hr capsule Take 1 capsule (120 mg) by mouth At Bedtime 90 capsule 1           Past Medical History:     Past Medical History:   Diagnosis Date     Anemia      Anemia of chronic disease 5/14/2020     Back pain      CKD (chronic kidney disease) stage 3, GFR 30-59 ml/min      CRF (chronic renal failure), stage 3  5/14/2020     Fall 11/2019    suffered multiple left rib fractures, C3 and T2 fractures     Mixed hyperlipidemia 7/7/2004     Paroxysmal atrial fibrillation (H)      Personal history of colonic polyps 1972    gets colonoscopy every five years, due in 2006     Pleural effusion on left 11/2019    after multiple rib fractures     Pulmonary hypertension (H) 5/10/2020    Added automatically from request for surgery [id number]     Recurrent Left Pleural effusion -- S/P Pleurex Cath 5/12/20 12/30/2019     Rosacea 1989     Type II or unspecified type diabetes mellitus without mention of complication, not stated as uncontrolled 1999     Unspecified essential hypertension 1994     Past Surgical History:   Procedure Laterality Date     ANESTHESIA CARDIOVERSION N/A 2/3/2020    Procedure: ANESTHESIA, FOR CARDIOVERSION;  Surgeon: GENERIC ANESTHESIA PROVIDER;  Location:  OR      RIGHT HEART CATH MEASUREMENTS RECORDED N/A 5/13/2020    Procedure: Right Heart Cath;  Surgeon: Senthil Silva MD;  Location:  HEART CARDIAC CATH LAB     HC REMOVE TONSILS/ADENOIDS,<13 Y/O      T & A <11y.o.     IR CHEST TUBE DRAIN TUNNELED LEFT  5/12/2020     IR CHEST TUBE PLACEMENT NON-TUNNELLED LEFT  7/11/2020     IR CHEST TUBE REMOVAL NON TUNNELED LEFT  7/17/2020     IR CHEST TUBE REMOVAL TUNNELED LEFT   2020     LAPAROSCOPIC CHOLECYSTECTOMY N/A 2020    Procedure: CHOLECYSTECTOMY, LAPAROSCOPIC;  Surgeon: Annette Lambert MD;  Location:  OR     LAPAROSCOPIC HERNIORRHAPHY INGUINAL BILATERAL Bilateral 10/27/2020    Procedure: LAPAROSCOPIC BILATERAL INGUINAL HERNIA REPAIR WITH MESH;  Surgeon: Brian Garsia MD;  Location:  OR     Family History   Problem Relation Age of Onset     Family History Negative Mother          age 71     Family History Negative Father          age 70     Diabetes Brother         alive age 77     Diabetes Brother         alive age 76     Family History Negative Brother              Family History Negative Brother              Diabetes Sister         alive age76     Family History Negative Sister          age 86     Heart Disease Sister              Family History Negative Sister              Family History Negative Sister              Diabetes Sister      Diabetes Sister      Diabetes Brother      Diabetes Brother      Social History     Socioeconomic History     Marital status:      Spouse name: Lianna     Number of children: 4     Years of education: 16     Highest education level: Not on file   Occupational History     Occupation: LRN     Employer: [employer]    Tobacco Use     Smoking status: Former Smoker     Packs/day: 0.20     Years: 40.00     Pack years: 8.00     Types: Cigarettes     Start date:      Quit date: 2008     Years since quittin.5     Smokeless tobacco: Never Used   Substance and Sexual Activity     Alcohol use: Not Currently     Alcohol/week: 35.0 standard drinks     Drug use: Not Currently     Sexual activity: Not on file   Other Topics Concern     Parent/sibling w/ CABG, MI or angioplasty before 65F 55M? Not Asked   Social History Narrative     Not on file     Social Determinants of Health     Financial Resource Strain:      Difficulty of Paying  Living Expenses:    Food Insecurity:      Worried About Running Out of Food in the Last Year:      Ran Out of Food in the Last Year:    Transportation Needs:      Lack of Transportation (Medical):      Lack of Transportation (Non-Medical):    Physical Activity:      Days of Exercise per Week:      Minutes of Exercise per Session:    Stress:      Feeling of Stress :    Social Connections:      Frequency of Communication with Friends and Family:      Frequency of Social Gatherings with Friends and Family:      Attends Hindu Services:      Active Member of Clubs or Organizations:      Attends Club or Organization Meetings:      Marital Status:    Intimate Partner Violence:      Fear of Current or Ex-Partner:      Emotionally Abused:      Physically Abused:      Sexually Abused:            Allergies:   No known drug allergies      KAMILLA Espino [facility]  Pager: [phone]        Thank you for allowing me to participate in the care of your patient.      Sincerely,     KAMILLA Espino [facility]  cc:   No referring provider defined for this encounter.

## 2021-07-19 NOTE — PROGRESS NOTES
Cardiology Clinic Progress Note    Tc Garcia MRN# [id number]   YOB: 1940 Age: 81 year old   Primary cardiologist: Dr. Julien         Assessment and Plan:     In summary, Tc Garcia presents to the [facility] clinic for follow up on chronic HFpEF. He appears well-compensated albeit with a worsening renal function, despite cessation of spironolactone last month, and a well-controlled blood pressure.     Plan:  - Increase goal my health tracker weight to 155-160 pounds.  - He sees nephrology in a couple weeks. Will ask them to weigh in on his worsening CKD. I will make no changes to his medications in the interim.     Follow-up:  - I will see him back in 3 months in the [facility] clinic. We will continue to monitor him with MHT in the meantime.   - Dr. Julien in 6 months.         History of Presenting Illness:      Tc Garcia is a pleasant 81 year old patient who presents today for a [facility] clinic follow up visit.     The patient has a complex history of the following -   # Chronic HFpEF, with multiple admissions in 2020. Enrolled in MHT with goal wt 150-156 lbs.  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 Anticoagulated with Eliquis.  # Poorly-controlled DMII  # HTN  # HLP  # CKD with variable renal function response to diuretics, follows with [facility] (Vidhi Galo). Baseline creat ~ 1.3.  # Anemia of chronic disease, requiring intermittent Fe infusions, followed by nephrology  # Obesity  # Hx of medication confusion and non-compliance, self-adjusts diuretics frequently.  # Social - Lives with wife, Radha. Sedentary. High sodium diet,  medication noncompliance, some concern over cognitive function and medical literacy.     Recent admissions:  - Beginning of May 2020 with progressive dyspnea and due to recurrent (transudative) pleural effusion a left PleurX catheter was placed. He was diuresed with IV furosemide and given empiric abx therapy. Echo showed normal LVEF 55-60% with normal wall motion. The RV was moderately dilated with decreased systolic function and mild TR. He underwent a RHC during that stay that showed mild PH (PA pressure of 28 mmHg (51/15), mean RA of 4mmHg, and mildly elevated filling pressures with a wedge of 12mmHg (Vwave 16). PVR was 3.9 Wood units and Carlos Manuel CO/CI was 4.09/2.23. With vasodilator challenge, his PVR normalized to 1.22 wood units. Therefore, he was deemed to have pulmonary hypertension out of proportion to his left heart disease. He was started on Cialis 5mg daily but because this was not a formulated preparation, was switched to sildenafil 20 mg 3 times daily and discharged home. Interestingly, he was not discharged on any diuretics. Discharge wt was 152 lbs. His admission was complicated by atrial flutter for which he was given amiodarone but because of prolongation in QTc, this was discontinued, and he was continued on his PTA verapamil.    Following discharge he developed symptomatic hypotension and via phone was told to stop the sildenafil. He then saw Dr. Chad gusman for PH evaluation a few days later. He reported hypertension with SBP >200mmHg and weight was up 12 lbs from discharge. Torsemide 10 mg daily, Losartan 25 mg daily, and KCL 40 mEq were all started. She recommended resuming sildenafil once he was felt to be euvolemic.    - Readmitted from 5/21 to 5/29 with uncontrolled diabetes and recurrent acute HFpEF, related to medication noncompliance. He again required IV lasix and was discharged on Lasix 40mg BID in place of the torsemide. Due to renal concerns, his hydrochlorothiazide and losartan  "were held. Fortunately, home health was arranged to help him manage his medications at home.   - 7/8-7/10/20 for re-accumulating pleural effusion, at which time IR evaluated his pleurex and was able to place to suction with 550ml fluid removed. He was also diuresed with good result.   - 7/10-7/18/20 for weakness/fall resulting in head contusion. He was found to have an empyema and received antibiotics. His pleurex was removed and a chest tube was placed. CT drained \"nearly all the fluid\" and was removed day prior to discharge.  ____________________________________    Since the admissions in 2020, I've been following Tc closely in [facility] and enrolled him in the T system. His weights and symptoms have fluctuated (typically related to diet, uncontrolled blood pressures and self-dosing diuretics), and most recently he had a bit of a prolonged recovery following abdominal hernia repair, but I'm happy to say he hasn't been readmitted with heart failure since the summer. His recent weight goal has been 150-156 lbs.    When I saw him last beginning of February, he appeared euvolemic and normotensive at a weight of 153 pounds on furosemide 40 mg daily.  Increase his Eliquis dose from 2.5 twice daily to 5 mg twice daily, given an improved and stable creatinine less than 1.5 in the recent many months.     At the beginning of March, Tc had 9 teeth extracted. This resulted in decreased oral intake and a fair amount of gum bleeding for the following week, and he was unfortunately admitted March 17 for weakness & near-syncope in the setting of dehydration. ECHO showed an EF of 55-60%, just mild PHTN and mild TR. IVF was administered, and his losartan, Lasix, and spironolactone were all held.  At discharge two days later, Lasix was restarted at 40 mg every other day and losartan was restarted at half his PTA dose.  He has since seen his PCP who has slowly placed him back on his PTA regimen, save for his Losartan which has been " "kept at 50 mg daily as opposed to BID. His creatinine as of April 5 was back to baseline at 1.2, with a BUN of 16, and potassium of 4.8.    When I saw Tc in April, he was doing better. His intake was back to normal. He was not limited by dyspnea, and didn't think he needs pulmonary rehab (previously referred).      In mid June, spironolactone was discontinued when his potassium level marina to 6.  After that, it came back down to 4.8.    Today, Tc returns to clinic stating he feels very well, overall. He denies chest pain, orthopnea, edema, claudication, palpitations, recurrent near syncope or syncope. His weights have been above goal range of 158-156, since beginning of this month.  They have been stable between 157 and 160 pounds. He tells me he is glad to have gained some weight. A PT has been helping with his chronic back pain, so he's been a little more active lately and feels good with that. He isn't using NSAIDs. Not limited by dyspnea, no abdominal bloating. No issues with bleeding. Intake is good. BP is well-controlled in clinic.  Creatinine is up a little at 1.7. BUN is WNL. He sees neph in two weeks.         Review of Systems:     12-pt ROS is negative except for as noted in the HPI.          Physical Exam:     Vitals: /72 (BP Location: Left arm, Cuff Size: Adult Regular)   Pulse 57   Ht 1.753 m (5' 9\")   Wt 73.5 kg (162 lb)   BMI 23.92 kg/m    Wt Readings from Last 10 Encounters:   07/19/21 73.5 kg (162 lb)   06/23/21 70.6 kg (155 lb 11.2 oz)   04/19/21 71.3 kg (157 lb 3.2 oz)   03/19/21 69 kg (152 lb 1.6 oz)   12/09/20 70.1 kg (154 lb 8 oz)   11/20/20 68 kg (150 lb)   11/16/20 69.1 kg (152 lb 4.8 oz)   10/27/20 68.9 kg (151 lb 14.4 oz)   10/01/20 68 kg (150 lb)   09/02/20 68.3 kg (150 lb 8 oz)       Constitutional:  Patient is pleasant, alert, cooperative, and in NAD.  HEENT:  NCAT. PERRLA. EOM's intact.   Neck:  CVP appears normal. No carotid bruits.   Pulmonary: Normal respiratory effort. " CTAB.   Cardiac: Irregularly irregular, variable S1, no S3/S4, no murmur or rub.   Abdomen:  Non-tender abdomen, no hepatosplenomegaly appreciated.   Vascular: Pulses in the upper and lower extremities are 2+ and equal bilaterally.  Extremities: No edema, erythema, cyanosis or tenderness appreciated.  Skin:  No rashes or lesions appreciated.   Neurological:  No gross motor or sensory deficits.   Psych: Appropriate affect.        Data:     Labs reviewed:  Recent Labs   Lab Test 03/17/21  0752 02/09/21  1306 12/01/20  1434 09/10/20  1359 08/10/20  1027 05/13/20  0553 05/12/20  0541 05/11/20  0542 04/25/20  1228   LDL  --   --   --   --   --  43  --   --   --    HDL  --   --   --   --   --  60  --   --   --    NHDL  --   --   --   --   --  56  --   --   --    CHOL  --   --   --   --   --  116  --   --   --    TRIG  --   --   --   --   --  65  --   --   --    TSH 0.94  --   --   --  4.75*  --  3.47  --   --    NTBNP  --  3,149* 3,052* 2,611*  --   --   --   --    < >   IRON  --   --   --   --   --   --   --  16*  --    FEB  --   --   --   --   --   --   --  161*  --    IRONSAT  --   --   --   --   --   --   --  10*  --    ERNESTO  --   --   --   --   --   --   --  142  --     < > = values in this interval not displayed.       Lab Results   Component Value Date    WBC 10.4 06/15/2021    RBC 4.08 (A) 06/15/2021    HGB 11.7 (A) 06/15/2021    HCT 36.3 (A) 06/15/2021    MCV 89 06/15/2021    MCH 28.7 06/15/2021    MCHC 32.2 06/15/2021    RDW 14.2 06/15/2021     06/15/2021       Lab Results   Component Value Date     07/12/2021    POTASSIUM 4.0 07/12/2021    CHLORIDE 100 07/12/2021    CO2 26 07/12/2021    ANIONGAP 4 03/18/2021     (A) 07/12/2021    BUN 18 07/12/2021    BUN 10 07/12/2021    CR 1.73 (A) 07/12/2021    GFRESTIMATED 40 (L) 06/21/2021    GFRESTBLACK 46 (L) 06/21/2021    LLOYD 9.3 07/12/2021      Lab Results   Component Value Date    AST 12 03/22/2021    ALT 5 03/22/2021       Lab Results   Component  Value Date    A1C 7.7 (H) 11/20/2020       Lab Results   Component Value Date    INR 1.50 (H) 11/21/2020    INR 1.18 (H) 07/17/2020           Problem List:     Patient Active Problem List   Diagnosis     DM type 2, Hgb A1C 8.2 on 4/25/20     Mixed hyperlipidemia     Essential hypertension     Pain in limb     Plantar fascial fibromatosis     HYPERLIPIDEMIA LDL GOAL <100     Hemothorax on left     Recurrent Left Pleural effusion -- S/P Pleurex Cath 5/12/20     ABBIE (acute kidney injury) (H)     Acute respiratory failure with hypoxia (H)     Pulmonary hypertension (H)     CRF (chronic renal failure), stage 3      Anemia of chronic disease     Atrial fibrillation/flutter -- on Eliquis and Amiodarone     DKA (diabetic ketoacidoses) (H)     Anemia in CKD (chronic kidney disease)     Dyspnea     SOB (shortness of breath)     Non-recurrent bilateral inguinal hernia without obstruction or gangrene     Acute cholecystitis     Abdominal pain, generalized     Non-intractable vomiting with nausea, unspecified vomiting type     Alcohol dependence (H)     CHF (congestive heart failure) (H)     Syncope and collapse     Weakness     Postoperative state     Acute kidney injury (H)     Chronic diastolic heart failure (H)           Medications:     Current Outpatient Medications   Medication Sig Dispense Refill     apixaban ANTICOAGULANT (ELIQUIS) 5 MG tablet Take 1 tablet (5 mg) by mouth 2 times daily 60 tablet 5     carvedilol (COREG) 6.25 MG tablet Take 6.25 mg by mouth daily  180 tablet 1     furosemide (LASIX) 40 MG tablet Take 1 1/2 tablets(60mg) by mouth every day 135 tablet 3     glucagon 1 MG kit 1 mg as needed for low blood sugar       HYDROcodone-acetaminophen (NORCO) 5-325 MG tablet Take 1 tablet by mouth every 6 hours as needed for severe pain       losartan (COZAAR) 25 MG tablet Take 1 tablet (25 mg) by mouth daily 90 tablet 3     LOVASTATIN 20 MG PO TABS 1 TABLET DAILY AT DINNER 90 Tab 0     nystatin (MYCOSTATIN) 465428  UNIT/GM external cream Apply topically 2 times daily as needed        verapamil ER (VERELAN) 120 MG 24 hr capsule Take 1 capsule (120 mg) by mouth At Bedtime 90 capsule 1           Past Medical History:     Past Medical History:   Diagnosis Date     Anemia      Anemia of chronic disease 5/14/2020     Back pain      CKD (chronic kidney disease) stage 3, GFR 30-59 ml/min      CRF (chronic renal failure), stage 3  5/14/2020     Fall 11/2019    suffered multiple left rib fractures, C3 and T2 fractures     Mixed hyperlipidemia 7/7/2004     Paroxysmal atrial fibrillation (H)      Personal history of colonic polyps 1972    gets colonoscopy every five years, due in 2006     Pleural effusion on left 11/2019    after multiple rib fractures     Pulmonary hypertension (H) 5/10/2020    Added automatically from request for surgery [id number]     Recurrent Left Pleural effusion -- S/P Pleurex Cath 5/12/20 12/30/2019     Rosacea 1989     Type II or unspecified type diabetes mellitus without mention of complication, not stated as uncontrolled 1999     Unspecified essential hypertension 1994     Past Surgical History:   Procedure Laterality Date     ANESTHESIA CARDIOVERSION N/A 2/3/2020    Procedure: ANESTHESIA, FOR CARDIOVERSION;  Surgeon: GENERIC ANESTHESIA PROVIDER;  Location:  OR      RIGHT HEART CATH MEASUREMENTS RECORDED N/A 5/13/2020    Procedure: Right Heart Cath;  Surgeon: Senthil Silva MD;  Location:  HEART CARDIAC CATH LAB     HC REMOVE TONSILS/ADENOIDS,<11 Y/O      T & A <11y.o.     IR CHEST TUBE DRAIN TUNNELED LEFT  5/12/2020     IR CHEST TUBE PLACEMENT NON-TUNNELLED LEFT  7/11/2020     IR CHEST TUBE REMOVAL NON TUNNELED LEFT  7/17/2020     IR CHEST TUBE REMOVAL TUNNELED LEFT  7/11/2020     LAPAROSCOPIC CHOLECYSTECTOMY N/A 11/22/2020    Procedure: CHOLECYSTECTOMY, LAPAROSCOPIC;  Surgeon: Annette Lambert MD;  Location:  OR     LAPAROSCOPIC HERNIORRHAPHY INGUINAL BILATERAL Bilateral 10/27/2020    Procedure:  LAPAROSCOPIC BILATERAL INGUINAL HERNIA REPAIR WITH MESH;  Surgeon: Brian aGrsia MD;  Location: [facility] OR     Family History   Problem Relation Age of Onset     Family History Negative Mother          age 71     Family History Negative Father          age 70     Diabetes Brother         alive age 77     Diabetes Brother         alive age 76     Family History Negative Brother              Family History Negative Brother              Diabetes Sister         alive age74     Family History Negative Sister          age 86     Heart Disease Sister              Family History Negative Sister              Family History Negative Sister              Diabetes Sister      Diabetes Sister      Diabetes Brother      Diabetes Brother      Social History     Socioeconomic History     Marital status:      Spouse name: Lianna     Number of children: 4     Years of education: 16     Highest education level: Not on file   Occupational History     Occupation: Crush on original products     Employer: [employer]    Tobacco Use     Smoking status: Former Smoker     Packs/day: 0.20     Years: 40.00     Pack years: 8.00     Types: Cigarettes     Start date:      Quit date: 2008     Years since quittin.5     Smokeless tobacco: Never Used   Substance and Sexual Activity     Alcohol use: Not Currently     Alcohol/week: 35.0 standard drinks     Drug use: Not Currently     Sexual activity: Not on file   Other Topics Concern     Parent/sibling w/ CABG, MI or angioplasty before 65F 55M? Not Asked   Social History Narrative     Not on file     Social Determinants of Health     Financial Resource Strain:      Difficulty of Paying Living Expenses:    Food Insecurity:      Worried About Running Out of Food in the Last Year:      Ran Out of Food in the Last Year:    Transportation Needs:      Lack of Transportation (Medical):      Lack of Transportation  (Non-Medical):    Physical Activity:      Days of Exercise per Week:      Minutes of Exercise per Session:    Stress:      Feeling of Stress :    Social Connections:      Frequency of Communication with Friends and Family:      Frequency of Social Gatherings with Friends and Family:      Attends Confucianism Services:      Active Member of Clubs or Organizations:      Attends Club or Organization Meetings:      Marital Status:    Intimate Partner Violence:      Fear of Current or Ex-Partner:      Emotionally Abused:      Physically Abused:      Sexually Abused:            Allergies:   No known drug allergies      KAMILLA Espino [facility]  Pager: [phone]

## 2021-07-19 NOTE — PATIENT INSTRUCTIONS
Today, we discussed the following:   - No changes today. Will see what nephrology has to say about the worsening kidney function.   - Follow up: With me in 3 months.     Please, remember to continue the followin.  Weigh yourself daily. Call if your weight is up > than 2 pounds in one day, or 5 pounds in one week; if you feel more short of breath or have worsening swelling in your legs or abdomen.  2.  Eat a low sodium diet (less than 2,000mg or 2g daily). If you eat less salt, you will retain less fluid.   3.  Avoid alcohol, as this can worsen heart failure.   4.  Avoid NSAIDs as able (For example, Ibuprofen / aleve / advil / naprosen / diclofenac).    Please call CORE nurse for any questions or concerns Mon-Fri 8am-4pm:   [phone]  For concerns after hours:   [phone] option 2   Scheduling phone number:   [phone]    Thank you for visiting with us today.   Ame Mejía PA-C  ______________________________________________________________

## 2021-07-19 NOTE — PROGRESS NOTES
[facility] Clinic  MyHealth Tracker HF     Telemanagement for review at visit today with VIPIN Almeida. Goal wt parameters and PRN diuretic plan to be addressed at today's visit.     Current goal weight: 150-156 lbs  Recent telemanagement data:   MyHealth Tracker CHF Flowsheet My weight today is:   6/25/2021 153   6/26/2021 154   6/27/2021 154   6/28/2021 155   6/29/2021 155   6/30/2021 155   7/1/2021 156   7/2/2021 160   7/3/2021 160   7/4/2021 160   7/5/2021 161   7/6/2021 160   7/8/2021 160   7/9/2021 156   7/10/2021 158   7/11/2021 157   7/12/2021 157   7/13/2021 156   7/14/2021 158   7/17/2021 158   7/18/2021 159       Maile Owen RN on 7/19/2021 at 1:09 PM

## 2021-08-06 ENCOUNTER — CARE COORDINATION (OUTPATIENT)
Dept: CARDIOLOGY | Facility: CLINIC | Age: 81
End: 2021-08-06

## 2021-08-06 NOTE — PROGRESS NOTES
[facility] Clinic  My Health Tracker HF Alert     Background:  MHT alerted for SOB; Tc has been at top end of goal weight for the last several days.    Today's home weight: 160#  Goal weight: 155-160  Recent weights:  MyHealth Tracker CHF Flowsheet My weight today is:   7/21/2021 158   7/22/2021 161   7/23/2021 160   7/24/2021 160   7/25/2021 159   7/26/2021 159   7/28/2021 159   7/29/2021 159   7/31/2021 159   8/1/2021 160   8/2/2021 160   8/5/2021 160   8/6/2021 160     Heart Failure sx: c/o of SOB with activity in the am and also when walking the dogs and getting mail. He states his walks are much shorter with the dogs than prior.  No other HF sx's    Current diuretic plan:  Lasix 60 mg daily  No future appointments.    Will send to FORTUNATO Almeida for review and direction.     Maile Owen RN on 8/6/2021 at 2:39 PM

## 2021-08-09 NOTE — PROGRESS NOTES
[facility]  My Health Tracker HF Alert     Background: MHT alerted to SOB;    Today's home weight: 160#  Goal weight: 155-160#    Recent weights:  MyHealth Tracker CHF Flowsheet My weight today is:   7/29/2021 159   7/31/2021 159   8/1/2021 160   8/2/2021 160   8/5/2021 160   8/6/2021 160   8/7/2021 160   8/8/2021 160     Heart Failure sx: SOB only  Current diuretic plan: Lasix 60 mg daily     Increased Lasix to 80 mg for the next 2 days. Will get weights in to MHT.     Will send message to the board to reassess on Wednesday.    Maile Owen RN on 8/9/2021 at 1:07 PM

## 2021-08-11 NOTE — PROGRESS NOTES
[facility] - C[facility] Clinic    Called patient to assess response to increased lasix and to get weight/sx report today as he has yet to call into MHT.  for call back.  Argelia Bacon RN on 8/11/2021 at 2:52 PM

## 2021-08-12 NOTE — PROGRESS NOTES
[facility] - C[facility] Clinic    -Called to check in on the pt due to medication adjustment and MHT symptoms.  for return call.     Maile Owen RN on 8/12/2021 at 12:29 PM

## 2021-08-17 NOTE — PROGRESS NOTES
[facility] Clinic  My Health Tracker HF Alert     Background: Last CORE visit 7/19/21 with Ame LEW, home weight 160 lbs, at which time HF appeared well-compensated and his My Health Tracker weight goal was increased to 155-160 lbs; no changes were made to medications with plan to follow-up with nephrology in ~2 weeks, and Ame in ~3 months   --8/6 Tc reported shortness of breath with exertion and he was instructed to increase lasix to 80 mg daily for 2 days    Diuretic plan: lasix 60 mg daily  Goal weight: 155-160 lbs  Recent weights: No symptoms reported today  [facility] Tr[facility] CHF Flowsheet My weight today is:   8/1/2021 160   8/2/2021 160   8/5/2021 160   8/6/2021 160   8/7/2021 160   8/8/2021 160   8/9/2021 160   8/10/2021 160   8/11/2021 160   8/12/2021 160   8/13/2021 159   8/14/2021 160   8/15/2021 160   8/16/2021 161       I spoke to Tc today and he told me he's had improvement in dyspnea after lasix increase; he'd like to continue higher dose. He noted he saw nephrology today and per his report they deferred this decision to cardiology. Nephrology jocy labs today.     I advised Tc that he should continue lasix 60 mg daily, and I'll watch for lab results and review them and his request with Ame LEW in next 1-2 days. Reminder to board.    Debi Mills RN BSN   3:28 PM 08/17/21

## 2021-08-19 NOTE — PROGRESS NOTES
[facility] - CORE Clinic     Ref Range & Units 2 d ago Comments   Glucose, Serum/Plasma 65 - 99 mg/dL 158High                Fasting reference interval     For someone without known diabetes, a glucose   value >125 mg/dL indicates that they may have   diabetes and this should be confirmed with a   follow-up test.        Urea nitrogen, Serum/Plasma (BUN) 7 - 25 mg/dL 18          Creatinine, Serum/Plasma 0.70 - 1.11 mg/dL 1.51High   For patients >49 years of age, the reference limit   for Creatinine is approximately 13% higher for people   identified as -American.        eGFR, non African American > OR = 60 mL/min/1.73m2 42Low           eGFR, African American > OR = 60 mL/min/1.73m2 49Low           Urea nitrogen/Creatinine, Serum/Plasma 6 - 22 (calc) 12          Sodium, Serum/Plasma 135 - 146 mmol/L 142          Potassium, Serum/Plasma 3.5 - 5.3 mmol/L 4.7          Chloride, Serum/Plasma 98 - 110 mmol/L 105          Carbon dioxide CO2), total, Serum/Plasma 20 - 32 mmol/L 30          Calcium, Serum/Plasma 8.6 - 10.3 mg/dL 9.2          Phosphate, Serum/Plasma 2.1 - 4.3 mg/dL 3.0          Albumin, Serum/Plasma 3.6 - 5.1 g/dL 4.3       Above results done 8/17 and reflect lasix 60mg/d. Awaiting T transmission for today.  Argelia Bacon RN on 8/19/2021 at 12:13 PM

## 2021-08-20 NOTE — PROGRESS NOTES
Glad he's feeling better. Creat today 1.5, down from 1.7 last month. Let's keep him on Lasix 60 mg daily. Thanks.

## 2021-08-20 NOTE — PROGRESS NOTES
[facility] Clinic  My Health Tracker HF Alert - no transmissions X2 days    Called patient to get current weights and to remind about daily calls. With recent weights >upper limit, his lasix increased to 60mg/d. He continues on that dose. BMP done on 8/17 in my note below.     Patient reporting that he feels better on the 60mg/d. He walked the mall yesterday for >1hr without issue. He has not been able to do that for some time.     Today's home weight: 159#    Goal weight: 155-160#  Recent weights:   8/20 159   8/19 160   8/18 161    Will forward BMP results/weights to Ame Mejía. Patient verbalized understanding.  Argelia Bacon RN on 8/20/2021 at 2:58 PM

## 2021-09-01 NOTE — PROGRESS NOTES
[facility]    We last received a My Health Tracker survey from Tc on 8/29. I called him to check in.    He said he's continued on higher dose of lasix as instructed in notes below and has been feeling well. Now able to go on longer walks with his dogs.    I asked him if he'd like to continue MHT and he thinks it's been helpful and I told him this is a reasonable plan. He will attempt to do daily surveys.    He asked about BPs, had recently had some readings 150/80s. He had been checking at variable points during the day. I asked him to check his BP 2-3x weekly, 1-2 hours post meds and alert us if these readings are consistently >150/80. He agreed to plan.    He's due for CORE follow-up in 10/2021, our office will contact him to arrange sooner to that date.    Debi Mills RN BSN   2:42 PM 09/01/21

## 2021-09-10 ENCOUNTER — CARE COORDINATION (OUTPATIENT)
Dept: CARDIOLOGY | Facility: CLINIC | Age: 81
End: 2021-09-10

## 2021-09-10 NOTE — PROGRESS NOTES
[facility] - CORE Clinic    -MHT weight parameters increased per discussion below.        Maile Owen RN on 9/10/2021 at 3:25 PM

## 2021-09-10 NOTE — PROGRESS NOTES
[facility]  My Health Tracker HF Alert     Background: MHT alert for weight above goal x 4 days.     Today's home weight: 161#     Goal weight: 155-160#  Recent weights:  MyHealth Tracker CHF Flowsheet My weight today is:   9/2/2021 159   9/6/2021 160   9/7/2021 161   9/8/2021 161   9/9/2021 161   9/10/2021 161       Heart Failure sx: Tc denies any HF sx's. States he feels fine. He is out mowing his lawn and taking his 1/2 mile daily walks.     Current diuretic plan:    Lasix 60 mg daily     No future appointments.    Maile Owen RN on 9/10/2021 at 1:37 PM

## 2021-10-01 ENCOUNTER — CARE COORDINATION (OUTPATIENT)
Dept: CARDIOLOGY | Facility: CLINIC | Age: 81
End: 2021-10-01

## 2021-10-01 DIAGNOSIS — I48.91 ATRIAL FIBRILLATION, UNSPECIFIED TYPE (H): ICD-10-CM

## 2021-10-01 RX ORDER — VERAPAMIL HYDROCHLORIDE 120 MG/1
120 CAPSULE, EXTENDED RELEASE ORAL AT BEDTIME
Qty: 90 CAPSULE | Refills: 3 | Status: SHIPPED | OUTPATIENT
Start: 2021-10-01 | End: 2021-11-12

## 2021-10-01 NOTE — PROGRESS NOTES
"[facility] - CORE Clinic    Called patient as her his MHT his weight dropped from 158 ->150# overnight. He confirmed this was an error and added, \"oh it must have been more like 160#.\"  He further stated that he is feeling well and has no sx.     He then stated that he needed a refill of his verapamil. Patient last seen by Ame Mejía on 7/19, per her clinic notes and med list, patient on verapamil 120mg/d. Refill sent. Argelia Bacon RN on 10/1/2021 at 2:50 PM    "

## 2021-10-07 ENCOUNTER — CARE COORDINATION (OUTPATIENT)
Dept: CARDIOLOGY | Facility: CLINIC | Age: 81
End: 2021-10-07

## 2021-10-07 NOTE — PROGRESS NOTES
[facility]  My Health Tracker HF Alert - patient reported sx    Current diuretic regimen:   Lasix 60mg/d  Today's home weight: 160# - he stated he was not able to call this in today   Goal weight: 155 - 163#  Recent weights:  MyHealth Tracker CHF Flowsheet My weight today is:   9/26/2021 160   9/27/2021 160   9/28/2021 160   9/29/2021 159   9/30/2021 158   10/1/2021 150   10/2/2021 159   10/3/2021 160   10/5/2021 158   10/6/2021 158     Heart Failure sx: On 10/6 patient reported SOB. Patient stated this happens occasionally. In taking the dog for a walk, he was not able to go as far. He god winded and had to stop btw 1/4 - 1/2 mile. He had no other occurances of SOB and today he is able to walk his typical distance(=>1/2mile) w/o issue.     He denied any orthopnea/PND, LE swelling or abdominal bloating. Again, today his breathing has returned to his baseline. Will continue to monitor daily.  Argelia Bacon RN on 10/7/2021 at 2:49 PM

## 2021-10-24 ENCOUNTER — HEALTH MAINTENANCE LETTER (OUTPATIENT)
Age: 81
End: 2021-10-24

## 2021-11-04 ENCOUNTER — TRANSFERRED RECORDS (OUTPATIENT)
Dept: HEALTH INFORMATION MANAGEMENT | Facility: CLINIC | Age: 81
End: 2021-11-04
Payer: COMMERCIAL

## 2021-11-05 ENCOUNTER — CARE COORDINATION (OUTPATIENT)
Dept: CARDIOLOGY | Facility: CLINIC | Age: 81
End: 2021-11-05

## 2021-11-05 ENCOUNTER — TRANSFERRED RECORDS (OUTPATIENT)
Dept: HEALTH INFORMATION MANAGEMENT | Facility: CLINIC | Age: 81
End: 2021-11-05
Payer: COMMERCIAL

## 2021-11-05 NOTE — PROGRESS NOTES
[facility]  My Health Tracker HF Alert - patient reporting sx    Today's home weight: 159#    Goal weight: 155-163#  Recent weights:  MyHealth Tracker CHF Flowsheet My weight today is:   10/23/2021 160   10/24/2021 160   10/26/2021 160   10/27/2021 161   10/28/2021 162   10/30/2021 161   11/2/2021 159   11/3/2021 159   11/5/2021 159     Current diuretic regimen:   lasix 40mg/d    Heart Failure sx: Called patient to assess as he reported SOB and dizziness. He stated that after waking and only when walking around his home he felt SOB. It lasted ~30min and currently he has no sx. He further stated this happens every 1-2 weeks. These occurrences have not increased in frequency, duration, or intensity. He denied orthopnea/PND and added that he sleeps well    He then stated the dizziness also occurred earlier today with position change or turning his head too abruptly this has not recurred.     As patient has no sx currently, will continue to closely monitor. Confirmed patients upcoming appt as below:    Future Appointments   Date Time Provider Department Center   11/11/2021 10:45 AM [facility]   11/12/2021  2:10 PM [facility]   1/27/2022 11:15 AM [facility]   Argelia Bacon RN on 11/5/2021 at 1:35 PM

## 2021-11-12 ENCOUNTER — OFFICE VISIT (OUTPATIENT)
Dept: CARDIOLOGY | Facility: CLINIC | Age: 81
End: 2021-11-12
Payer: COMMERCIAL

## 2021-11-12 VITALS
SYSTOLIC BLOOD PRESSURE: 128 MMHG | WEIGHT: 164 LBS | HEART RATE: 60 BPM | HEIGHT: 68 IN | OXYGEN SATURATION: 100 % | DIASTOLIC BLOOD PRESSURE: 68 MMHG | BODY MASS INDEX: 24.86 KG/M2

## 2021-11-12 DIAGNOSIS — I48.91 ATRIAL FIBRILLATION, UNSPECIFIED TYPE (H): ICD-10-CM

## 2021-11-12 DIAGNOSIS — I50.32 CHRONIC HEART FAILURE WITH PRESERVED EJECTION FRACTION (HFPEF) (H): ICD-10-CM

## 2021-11-12 DIAGNOSIS — I10 ESSENTIAL HYPERTENSION: Primary | ICD-10-CM

## 2021-11-12 PROCEDURE — 99214 OFFICE O/P EST MOD 30 MIN: CPT | Performed by: PHYSICIAN ASSISTANT

## 2021-11-12 RX ORDER — VERAPAMIL HYDROCHLORIDE 120 MG/1
120 TABLET, FILM COATED, EXTENDED RELEASE ORAL AT BEDTIME
Qty: 90 TABLET | Refills: 3 | Status: SHIPPED | OUTPATIENT
Start: 2021-11-12 | End: 2022-06-08

## 2021-11-12 ASSESSMENT — MIFFLIN-ST. JEOR: SCORE: 1418.4

## 2021-11-12 NOTE — PROGRESS NOTES
Cardiology Clinic Progress Note    Tc Garcia MRN# [id number]   YOB: 1940 Age: 81 year old   Primary cardiologist: Dr. Julien         Assessment and Plan:     In summary, Tc Garcia presents to the CORE clinic for follow up on chronic HFpEF. He appears well-compensated from a volume standpoint, BP and HR are well-controlled, and renal function is stable.     Plan:  - No changes today. Goal home wt ~ 160 lbs.    I have instructed him that, if the weight goes up to 163 lbs tomorrow, or over the weekend, he should take 80 mg (2 tablets) of torsemide for the following 2 days, then return to 60 mg daily.     Follow-up:  - Dr. Julien in January, as scheduled.        History of Presenting Illness:      Tc Garcia is a pleasant 81 year old patient who presents today for a CORE clinic follow up visit.     The patient has a complex history of the following -   # Chronic HFpEF, with multiple admissions in 2020. Enrolled in MHT with goal wt 155-163 lbs.  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 Has declined pulmonary rehab.   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 Anticoagulated with Eliquis.  # Poorly-controlled DMII  # HTN  # HLP  # CKD with variable renal function response to diuretics, follows with [facility] nephrology. Baseline creat ~ 1.3.  # Anemia of chronic disease, requiring intermittent Fe infusions, followed by nephrology  # Obesity  # Hx of medication confusion and non-compliance, self-adjusts diuretics frequently.  # Social - Lives with wife, Radha. Sedentary. High sodium diet, medication noncompliance, some concern over cognitive function and medical literacy.     Recent  admissions:  - Beginning of May 2020 with progressive dyspnea and due to recurrent (transudative) pleural effusion a left PleurX catheter was placed. He was diuresed with IV furosemide and given empiric abx therapy. Echo showed normal LVEF 55-60% with normal wall motion. The RV was moderately dilated with decreased systolic function and mild TR. He underwent a RHC during that stay that showed mild PH (PA pressure of 28 mmHg (51/15), mean RA of 4mmHg, and mildly elevated filling pressures with a wedge of 12mmHg (Vwave 16). PVR was 3.9 Wood units and Carlos Manuel CO/CI was 4.09/2.23. With vasodilator challenge, his PVR normalized to 1.22 wood units. Therefore, he was deemed to have pulmonary hypertension out of proportion to his left heart disease. He was started on Cialis 5mg daily but because this was not a formulated preparation, was switched to sildenafil 20 mg 3 times daily and discharged home. Interestingly, he was not discharged on any diuretics. Discharge wt was 152 lbs. His admission was complicated by atrial flutter for which he was given amiodarone but because of prolongation in QTc, this was discontinued, and he was continued on his PTA verapamil.    Following discharge he developed symptomatic hypotension and via phone was told to stop the sildenafil. He then saw Dr. Chad gusman for PH evaluation a few days later. He reported hypertension with SBP >200mmHg and weight was up 12 lbs from discharge. Torsemide 10 mg daily, Losartan 25 mg daily, and KCL 40 mEq were all started. She recommended resuming sildenafil once he was felt to be euvolemic.    - Readmitted from 5/21 to 5/29 with uncontrolled diabetes and recurrent acute HFpEF, related to medication noncompliance. He again required IV lasix and was discharged on Lasix 40mg BID in place of the torsemide. Due to renal concerns, his hydrochlorothiazide and losartan were held. Fortunately, home health was arranged to help him manage his medications at home.   -  "7/8-7/10/20 for re-accumulating pleural effusion, at which time IR evaluated his pleurex and was able to place to suction with 550ml fluid removed. He was also diuresed with good result.   - 7/10-7/18/20 for weakness/fall resulting in head contusion. He was found to have an empyema and received antibiotics. His pleurex was removed and a chest tube was placed. CT drained \"nearly all the fluid\" and was removed day prior to discharge.  ____________________________________    Since the admissions in 2020, I've been [place] closely [place] [facility] [place] in the T system. His weights and symptoms have fluctuated (typically related to diet, uncontrolled blood pressures and self-dosing diuretics), but I'm happy to say he hasn't been readmitted with heart failure since the summer.     At the beginning of March 2021, Tc had 9 teeth extracted. This resulted in decreased oral intake and a fair amount of gum bleeding for the following week, and he was unfortunately admitted March 17 for weakness & near-syncope in the setting of dehydration. ECHO showed an EF of 55-60%, just mild PHTN and mild TR. IVF was administered, and his losartan, Lasix, and spironolactone were all held. Since that time, he was slowly placed back on his PTA regimen, save for his Losartan (which has been kept at 50 mg daily as opposed to BID).     In mid June, spironolactone was discontinued when his potassium level marina to 6.   His creatinine has been quite variable, ranging from 1.3 - 1.9 over the past year.  He saw nephrology in August. No changes were made, advised f/u in 3 months.     Today, Tc returns to clinic stating he feels very well, overall. His weight over the past four months has slowly risen by 10 lbs, and over the past 2 days he has gained 2 lbs (160 --> 162 lbs) however he denies any acute CHF symptoms and doesn't feel he's retaining fluid. Walks the dog 1/4-1/2 mile daily, does yard work and house work routinely without " "limiting dyspnea. Some days fatigued, others not. Sleeping well, appetite feels normal without abdominal bloating. He denies chest pain, orthopnea, edema, claudication, palpitations, recurrent near syncope or syncope. No issues with bleeding. BP and HR are well-controlled in clinic.  Most recent labs obtained by his PCP show a creat of 1.5, K+ 4.3, BUN 16.          Review of Systems:     12-pt ROS is negative except for as noted in the HPI.          Physical Exam:     Vitals: /68 (BP Location: Left arm, Cuff Size: Adult Regular)   Pulse 60   Ht 1.727 m (5' 8\")   Wt 74.4 kg (164 lb)   SpO2 100%   BMI 24.94 kg/m    Wt Readings from Last 10 Encounters:   11/12/21 74.4 kg (164 lb)   07/19/21 73.5 kg (162 lb)   06/23/21 70.6 kg (155 lb 11.2 oz)   04/19/21 71.3 kg (157 lb 3.2 oz)   03/19/21 69 kg (152 lb 1.6 oz)   12/09/20 70.1 kg (154 lb 8 oz)   11/20/20 68 kg (150 lb)   11/16/20 69.1 kg (152 lb 4.8 oz)   10/27/20 68.9 kg (151 lb 14.4 oz)   10/01/20 68 kg (150 lb)       Constitutional:  Patient is pleasant, alert, cooperative, and in NAD.  HEENT:  NCAT. PERRLA. EOM's intact.   Neck:  CVP appears normal. No carotid bruits.   Pulmonary: Normal respiratory effort. Chronic LLL blunted lung sounds. No crackles.   Cardiac: Irregularly irregular, variable S1, no S3/S4, no murmur or rub.   Abdomen:  Non-tender abdomen, no hepatosplenomegaly appreciated.   Vascular: Pulses in the upper and lower extremities are 2+ and equal bilaterally.  Extremities: No edema, erythema, cyanosis or tenderness appreciated.  Skin:  No rashes or lesions appreciated.   Neurological:  No gross motor or sensory deficits.   Psych: Appropriate affect.        Data:     Labs reviewed:  Recent Labs   Lab Test 03/17/21  0752 02/09/21  1306 12/01/20  1434 09/10/20  1359 09/02/20  1044 08/10/20  1027 06/16/20  1313 05/13/20  0553 05/12/20  0541 05/11/20  0542   LDL  --   --   --   --   --   --   --  43  --   --    HDL  --   --   --   --   --   --   " --  60  --   --    NHDL  --   --   --   --   --   --   --  56  --   --    CHOL  --   --   --   --   --   --   --  116  --   --    TRIG  --   --   --   --   --   --   --  65  --   --    TSH 0.94  --   --   --   --  4.75*  --   --  3.47  --    NTBNP  --  3,149* 3,052* 2,611*   < >  --    < >  --   --   --    IRON  --   --   --   --   --   --   --   --   --  16*   FEB  --   --   --   --   --   --   --   --   --  161*   IRONSAT  --   --   --   --   --   --   --   --   --  10*   ERNESTO  --   --   --   --   --   --   --   --   --  142    < > = values in this interval not displayed.       Lab Results   Component Value Date    WBC 10.4 06/15/2021    RBC 4.08 (A) 06/15/2021    HGB 11.7 (A) 06/15/2021    HCT 36.3 (A) 06/15/2021    MCV 89 06/15/2021    MCH 28.7 06/15/2021    MCHC 32.2 06/15/2021    RDW 14.2 06/15/2021     06/15/2021       Lab Results   Component Value Date     07/12/2021    POTASSIUM 4.0 07/12/2021    CHLORIDE 100 07/12/2021    CO2 26 07/12/2021    ANIONGAP 4 03/18/2021     (A) 07/12/2021    BUN 18 07/12/2021    BUN 10 07/12/2021    CR 1.73 (A) 07/12/2021    GFRESTIMATED 40 (L) 06/21/2021    GFRESTBLACK 46 (L) 06/21/2021    LLOYD 9.3 07/12/2021      Lab Results   Component Value Date    AST 12 03/22/2021    ALT 5 03/22/2021       Lab Results   Component Value Date    A1C 7.7 (H) 11/20/2020       Lab Results   Component Value Date    INR 1.50 (H) 11/21/2020    INR 1.18 (H) 07/17/2020           Problem List:     Patient Active Problem List   Diagnosis     DM type 2, Hgb A1C 8.2 on 4/25/20     Mixed hyperlipidemia     Essential hypertension     Pain in limb     Plantar fascial fibromatosis     HYPERLIPIDEMIA LDL GOAL <100     Hemothorax on left     Recurrent Left Pleural effusion -- S/P Pleurex Cath 5/12/20     ABBIE (acute kidney injury) (H)     Acute respiratory failure with hypoxia (H)     Pulmonary hypertension (H)     CRF (chronic renal failure), stage 3      Anemia of chronic disease     Atrial  fibrillation/flutter -- on Eliquis and Amiodarone     DKA (diabetic ketoacidoses)     Anemia in CKD (chronic kidney disease)     Dyspnea     SOB (shortness of breath)     Non-recurrent bilateral inguinal hernia without obstruction or gangrene     Acute cholecystitis     Abdominal pain, generalized     Non-intractable vomiting with nausea, unspecified vomiting type     Alcohol dependence (H)     CHF (congestive heart failure) (H)     Syncope and collapse     Weakness     Postoperative state     Acute kidney injury (H)     Chronic diastolic heart failure (H)           Medications:     Current Outpatient Medications   Medication Sig Dispense Refill     apixaban ANTICOAGULANT (ELIQUIS) 5 MG tablet Take 1 tablet (5 mg) by mouth 2 times daily 60 tablet 5     carvedilol (COREG) 6.25 MG tablet Take 6.25 mg by mouth daily  180 tablet 1     furosemide (LASIX) 40 MG tablet Take 1 1/2 tablets(60mg) by mouth every day 135 tablet 3     glucagon 1 MG kit 1 mg as needed for low blood sugar       losartan (COZAAR) 25 MG tablet Take 1 tablet (25 mg) by mouth daily 90 tablet 3     LOVASTATIN 20 MG PO TABS 1 TABLET DAILY AT DINNER 90 Tab 0     nystatin (MYCOSTATIN) 936505 UNIT/GM external cream Apply topically 2 times daily as needed        verapamil ER (VERELAN) 120 MG 24 hr capsule Take 1 capsule (120 mg) by mouth At Bedtime 90 capsule 3     HYDROcodone-acetaminophen (NORCO) 5-325 MG tablet Take 1 tablet by mouth every 6 hours as needed for severe pain (Patient not taking: Reported on 11/12/2021)             Past Medical History:     Past Medical History:   Diagnosis Date     Anemia      Anemia of chronic disease 5/14/2020     Back pain      CKD (chronic kidney disease) stage 3, GFR 30-59 ml/min (H)      CRF (chronic renal failure), stage 3  5/14/2020     Fall 11/2019    suffered multiple left rib fractures, C3 and T2 fractures     Mixed hyperlipidemia 7/7/2004     Paroxysmal atrial fibrillation (H)      Personal history of colonic  polyps 1972    gets colonoscopy every five years, due in      Pleural effusion on left 2019    after multiple rib fractures     Pulmonary hypertension (H) 5/10/2020    Added automatically from request for surgery [id number]     Recurrent Left Pleural effusion -- S/P Pleurex Cath 2019     Rosacea      Type II or unspecified type diabetes mellitus without mention of complication, not stated as uncontrolled      Unspecified essential hypertension      Past Surgical History:   Procedure Laterality Date     ANESTHESIA CARDIOVERSION N/A 2/3/2020    Procedure: ANESTHESIA, FOR CARDIOVERSION;  Surgeon: GENERIC ANESTHESIA PROVIDER;  Location:  OR     CV RIGHT HEART CATH MEASUREMENTS RECORDED N/A 2020    Procedure: Right Heart Cath;  Surgeon: Senthil Silva MD;  Location:  HEART CARDIAC CATH LAB     HC REMOVE TONSILS/ADENOIDS,<13 Y/O      T & A <11y.o.     IR CHEST TUBE DRAIN TUNNELED LEFT  2020     IR CHEST TUBE PLACEMENT NON-TUNNELLED LEFT  2020     IR CHEST TUBE REMOVAL NON TUNNELED LEFT  2020     IR CHEST TUBE REMOVAL TUNNELED LEFT  2020     LAPAROSCOPIC CHOLECYSTECTOMY N/A 2020    Procedure: CHOLECYSTECTOMY, LAPAROSCOPIC;  Surgeon: Annette Lambert MD;  Location:  OR     LAPAROSCOPIC HERNIORRHAPHY INGUINAL BILATERAL Bilateral 10/27/2020    Procedure: LAPAROSCOPIC BILATERAL INGUINAL HERNIA REPAIR WITH MESH;  Surgeon: Brian Garsia MD;  Location:  OR     Family History   Problem Relation Age of Onset     Family History Negative Mother          age 71     Family History Negative Father          age 70     Diabetes Brother         alive age 77     Diabetes Brother         alive age 76     Family History Negative Brother              Family History Negative Brother              Diabetes Sister         alive age74     Family History Negative Sister          age 86     Heart Disease Sister               Family History Negative Sister              Family History Negative Sister              Diabetes Sister      Diabetes Sister      Diabetes Brother      Diabetes Brother      Social History     Socioeconomic History     Marital status:      Spouse name: Lianna     Number of children: 4     Years of education: 16     Highest education level: Not on file   Occupational History     Occupation: Doctor At Work     Employer: [employer]    Tobacco Use     Smoking status: Former Smoker     Packs/day: 0.20     Years: 40.00     Pack years: 8.00     Types: Cigarettes     Start date:      Quit date: 2008     Years since quittin.8     Smokeless tobacco: Never Used   Substance and Sexual Activity     Alcohol use: Not Currently     Alcohol/week: 35.0 standard drinks     Drug use: Not Currently     Sexual activity: Not on file   Other Topics Concern     Parent/sibling w/ CABG, MI or angioplasty before 65F 55M? Not Asked   Social History Narrative     Not on file     Social Determinants of Health     Financial Resource Strain: Not on file   Food Insecurity: Not on file   Transportation Needs: Not on file   Physical Activity: Not on file   Stress: Not on file   Social Connections: Not on file   Intimate Partner Violence: Not on file   Housing Stability: Not on file           Allergies:   No known drug allergies      KAMILLA Espino [facility]  Pager: [phone]

## 2021-11-12 NOTE — PATIENT INSTRUCTIONS
Today, we discussed the following:   - Results:     The recent kidney function looks better.   - Medication changes:      If the weight goes up to 163 lbs tomorrow, or over the weekend, take 80 mg (2 tablets) of torsemide for the next 2 days, then return to 60 mg daily.   - Follow up:     With Dr. Julien in January, as scheduled.    Please, remember to continue the followin.  Weigh yourself daily. Call if your weight is up > than 2 pounds in one day, or 5 pounds in one week; if you feel more short of breath or have worsening swelling in your legs or abdomen.  2.  Eat a low sodium diet (less than 2,000mg or 2g daily). If you eat less salt, you will retain less fluid.   3.  Avoid alcohol, as this can worsen heart failure.   4.  Avoid NSAIDs as able (For example, Ibuprofen / aleve / advil / naprosen / diclofenac).    Please call CORE nurse for any questions or concerns Mon-Fri 8am-4pm:   [phone]  For concerns after hours:   [phone] option 2   Scheduling phone number:   [phone]    Thank you for visiting with us today.   Ame Mejía PA-C  ______________________________________________________________

## 2021-11-12 NOTE — LETTER
11/12/2021    Jessika Gutierrezchuy Cordoba  [address]  [facility] [address]    RE: Tc Garcia       Dear Colleague,     I had the pleasure of seeing Tc Garcia in the [facility].    Cardiology Clinic Progress Note    Tc Garcia MRN# [id number]   YOB: 1940 Age: 81 year old   Primary cardiologist: Dr. Julien         Assessment and Plan:     In summary, Tc Garcia presents to the [facility] clinic for follow up on chronic HFpEF. He appears well-compensated from a volume standpoint, BP and HR are well-controlled, and renal function is stable.     Plan:  - No changes today. Goal home wt ~ 160 lbs.    I have instructed him that, if the weight goes up to 163 lbs tomorrow, or over the weekend, he should take 80 mg (2 tablets) of torsemide for the following 2 days, then return to 60 mg daily.     Follow-up:  - Dr. Julien in January, as scheduled.        History of Presenting Illness:      Tc Garcia is a pleasant 81 year old patient who presents today for a CORE clinic follow up visit.     The patient has a complex history of the following -   # Chronic HFpEF, with multiple admissions in 2020. Enrolled in [facility] with goal wt 155-163 lbs.  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 Has declined pulmonary rehab.   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 Anticoagulated with Eliquis.  # Poorly-controlled DMII  # HTN  # HLP  # CKD with variable renal function response to diuretics, follows with [facility]. Baseline creat ~ 1.3.  # Anemia of chronic disease, requiring intermittent Fe infusions, followed by nephrology  # Obesity  # Hx of medication confusion and  non-compliance, self-adjusts diuretics frequently.  # Social - Lives with wife, Radha. Sedentary. High sodium diet, medication noncompliance, some concern over cognitive function and medical literacy.     Recent admissions:  - Beginning of May 2020 with progressive dyspnea and due to recurrent (transudative) pleural effusion a left PleurX catheter was placed. He was diuresed with IV furosemide and given empiric abx therapy. Echo showed normal LVEF 55-60% with normal wall motion. The RV was moderately dilated with decreased systolic function and mild TR. He underwent a RHC during that stay that showed mild PH (PA pressure of 28 mmHg (51/15), mean RA of 4mmHg, and mildly elevated filling pressures with a wedge of 12mmHg (Vwave 16). PVR was 3.9 Wood units and Carlos Manuel CO/CI was 4.09/2.23. With vasodilator challenge, his PVR normalized to 1.22 wood units. Therefore, he was deemed to have pulmonary hypertension out of proportion to his left heart disease. He was started on Cialis 5mg daily but because this was not a formulated preparation, was switched to sildenafil 20 mg 3 times daily and discharged home. Interestingly, he was not discharged on any diuretics. Discharge wt was 152 lbs. His admission was complicated by atrial flutter for which he was given amiodarone but because of prolongation in QTc, this was discontinued, and he was continued on his PTA verapamil.    Following discharge he developed symptomatic hypotension and via phone was told to stop the sildenafil. He then saw Dr. Chad gusman for PH evaluation a few days later. He reported hypertension with SBP >200mmHg and weight was up 12 lbs from discharge. Torsemide 10 mg daily, Losartan 25 mg daily, and KCL 40 mEq were all started. She recommended resuming sildenafil once he was felt to be euvolemic.    - Readmitted from 5/21 to 5/29 with uncontrolled diabetes and recurrent acute HFpEF, related to medication noncompliance. He again required IV lasix and was  "discharged on Lasix 40mg BID in place of the torsemide. Due to renal concerns, his hydrochlorothiazide and losartan were held. Fortunately, home health was arranged to help him manage his medications at home.   - 7/8-7/10/20 for re-accumulating pleural effusion, at which time IR evaluated his pleurex and was able to place to suction with 550ml fluid removed. He was also diuresed with good result.   - 7/10-7/18/20 for weakness/fall resulting in head contusion. He was found to have an empyema and received antibiotics. His pleurex was removed and a chest tube was placed. CT drained \"nearly all the fluid\" and was removed day prior to discharge.  ____________________________________    Since the admissions in 2020, I've been following Tc closely in [facility] and enrolled him in the T system. His weights and symptoms have fluctuated (typically related to diet, uncontrolled blood pressures and self-dosing diuretics), but I'm happy to say he hasn't been readmitted with heart failure since the summer.     At the beginning of March 2021, Tc had 9 teeth extracted. This resulted in decreased oral intake and a fair amount of gum bleeding for the following week, and he was unfortunately admitted March 17 for weakness & near-syncope in the setting of dehydration. ECHO showed an EF of 55-60%, just mild PHTN and mild TR. IVF was administered, and his losartan, Lasix, and spironolactone were all held. Since that time, he was slowly placed back on his PTA regimen, save for his Losartan (which has been kept at 50 mg daily as opposed to BID).     In mid June, spironolactone was discontinued when his potassium level marina to 6.   His creatinine has been quite variable, ranging from 1.3 - 1.9 over the past year.  He saw nephrology in August. No changes were made, advised f/u in 3 months.     Today, Tc returns to clinic stating he feels very well, overall. His weight over the past four months has slowly risen by 10 lbs, and over the past " "2 days he has gained 2 lbs (160 --> 162 lbs) however he denies any acute CHF symptoms and doesn't feel he's retaining fluid. Walks the dog 1/4-1/2 mile daily, does yard work and house work routinely without limiting dyspnea. Some days fatigued, others not. Sleeping well, appetite feels normal without abdominal bloating. He denies chest pain, orthopnea, edema, claudication, palpitations, recurrent near syncope or syncope. No issues with bleeding. BP and HR are well-controlled in clinic.  Most recent labs obtained by his PCP show a creat of 1.5, K+ 4.3, BUN 16.          Review of Systems:     12-pt ROS is negative except for as noted in the HPI.          Physical Exam:     Vitals: /68 (BP Location: Left arm, Cuff Size: Adult Regular)   Pulse 60   Ht 1.727 m (5' 8\")   Wt 74.4 kg (164 lb)   SpO2 100%   BMI 24.94 kg/m    Wt Readings from Last 10 Encounters:   11/12/21 74.4 kg (164 lb)   07/19/21 73.5 kg (162 lb)   06/23/21 70.6 kg (155 lb 11.2 oz)   04/19/21 71.3 kg (157 lb 3.2 oz)   03/19/21 69 kg (152 lb 1.6 oz)   12/09/20 70.1 kg (154 lb 8 oz)   11/20/20 68 kg (150 lb)   11/16/20 69.1 kg (152 lb 4.8 oz)   10/27/20 68.9 kg (151 lb 14.4 oz)   10/01/20 68 kg (150 lb)       Constitutional:  Patient is pleasant, alert, cooperative, and in NAD.  HEENT:  NCAT. PERRLA. EOM's intact.   Neck:  CVP appears normal. No carotid bruits.   Pulmonary: Normal respiratory effort. Chronic LLL blunted lung sounds. No crackles.   Cardiac: Irregularly irregular, variable S1, no S3/S4, no murmur or rub.   Abdomen:  Non-tender abdomen, no hepatosplenomegaly appreciated.   Vascular: Pulses in the upper and lower extremities are 2+ and equal bilaterally.  Extremities: No edema, erythema, cyanosis or tenderness appreciated.  Skin:  No rashes or lesions appreciated.   Neurological:  No gross motor or sensory deficits.   Psych: Appropriate affect.        Data:     Labs reviewed:  Recent Labs   Lab Test 03/17/21  0752 02/09/21  1306 " 12/01/20  1434 09/10/20  1359 09/02/20  1044 08/10/20  1027 06/16/20  1313 05/13/20  0553 05/12/20  0541 05/11/20  0542   LDL  --   --   --   --   --   --   --  43  --   --    HDL  --   --   --   --   --   --   --  60  --   --    NHDL  --   --   --   --   --   --   --  56  --   --    CHOL  --   --   --   --   --   --   --  116  --   --    TRIG  --   --   --   --   --   --   --  65  --   --    TSH 0.94  --   --   --   --  4.75*  --   --  3.47  --    NTBNP  --  3,149* 3,052* 2,611*   < >  --    < >  --   --   --    IRON  --   --   --   --   --   --   --   --   --  16*   FEB  --   --   --   --   --   --   --   --   --  161*   IRONSAT  --   --   --   --   --   --   --   --   --  10*   ERNESTO  --   --   --   --   --   --   --   --   --  142    < > = values in this interval not displayed.       Lab Results   Component Value Date    WBC 10.4 06/15/2021    RBC 4.08 (A) 06/15/2021    HGB 11.7 (A) 06/15/2021    HCT 36.3 (A) 06/15/2021    MCV 89 06/15/2021    MCH 28.7 06/15/2021    MCHC 32.2 06/15/2021    RDW 14.2 06/15/2021     06/15/2021       Lab Results   Component Value Date     07/12/2021    POTASSIUM 4.0 07/12/2021    CHLORIDE 100 07/12/2021    CO2 26 07/12/2021    ANIONGAP 4 03/18/2021     (A) 07/12/2021    BUN 18 07/12/2021    BUN 10 07/12/2021    CR 1.73 (A) 07/12/2021    GFRESTIMATED 40 (L) 06/21/2021    GFRESTBLACK 46 (L) 06/21/2021    LLOYD 9.3 07/12/2021      Lab Results   Component Value Date    AST 12 03/22/2021    ALT 5 03/22/2021       Lab Results   Component Value Date    A1C 7.7 (H) 11/20/2020       Lab Results   Component Value Date    INR 1.50 (H) 11/21/2020    INR 1.18 (H) 07/17/2020           Problem List:     Patient Active Problem List   Diagnosis     DM type 2, Hgb A1C 8.2 on 4/25/20     Mixed hyperlipidemia     Essential hypertension     Pain in limb     Plantar fascial fibromatosis     HYPERLIPIDEMIA LDL GOAL <100     Hemothorax on left     Recurrent Left Pleural effusion -- S/P  Pleurex Cath 5/12/20     ABBIE (acute kidney injury) (H)     Acute respiratory failure with hypoxia (H)     Pulmonary hypertension (H)     CRF (chronic renal failure), stage 3      Anemia of chronic disease     Atrial fibrillation/flutter -- on Eliquis and Amiodarone     DKA (diabetic ketoacidoses)     Anemia in CKD (chronic kidney disease)     Dyspnea     SOB (shortness of breath)     Non-recurrent bilateral inguinal hernia without obstruction or gangrene     Acute cholecystitis     Abdominal pain, generalized     Non-intractable vomiting with nausea, unspecified vomiting type     Alcohol dependence (H)     CHF (congestive heart failure) (H)     Syncope and collapse     Weakness     Postoperative state     Acute kidney injury (H)     Chronic diastolic heart failure (H)           Medications:     Current Outpatient Medications   Medication Sig Dispense Refill     apixaban ANTICOAGULANT (ELIQUIS) 5 MG tablet Take 1 tablet (5 mg) by mouth 2 times daily 60 tablet 5     carvedilol (COREG) 6.25 MG tablet Take 6.25 mg by mouth daily  180 tablet 1     furosemide (LASIX) 40 MG tablet Take 1 1/2 tablets(60mg) by mouth every day 135 tablet 3     glucagon 1 MG kit 1 mg as needed for low blood sugar       losartan (COZAAR) 25 MG tablet Take 1 tablet (25 mg) by mouth daily 90 tablet 3     LOVASTATIN 20 MG PO TABS 1 TABLET DAILY AT DINNER 90 Tab 0     nystatin (MYCOSTATIN) 210498 UNIT/GM external cream Apply topically 2 times daily as needed        verapamil ER (VERELAN) 120 MG 24 hr capsule Take 1 capsule (120 mg) by mouth At Bedtime 90 capsule 3     HYDROcodone-acetaminophen (NORCO) 5-325 MG tablet Take 1 tablet by mouth every 6 hours as needed for severe pain (Patient not taking: Reported on 11/12/2021)             Past Medical History:     Past Medical History:   Diagnosis Date     Anemia      Anemia of chronic disease 5/14/2020     Back pain      CKD (chronic kidney disease) stage 3, GFR 30-59 ml/min (H)      CRF (chronic  renal failure), stage 3  2020     Fall 2019    suffered multiple left rib fractures, C3 and T2 fractures     Mixed hyperlipidemia 2004     Paroxysmal atrial fibrillation (H)      Personal history of colonic polyps 1972    gets colonoscopy every five years, due in      Pleural effusion on left 2019    after multiple rib fractures     Pulmonary hypertension (H) 5/10/2020    Added automatically from request for surgery [id number]     Recurrent Left Pleural effusion -- S/P Pleurex Cath 2019     Rosacea      Type II or unspecified type diabetes mellitus without mention of complication, not stated as uncontrolled      Unspecified essential hypertension      Past Surgical History:   Procedure Laterality Date     ANESTHESIA CARDIOVERSION N/A 2/3/2020    Procedure: ANESTHESIA, FOR CARDIOVERSION;  Surgeon: GENERIC ANESTHESIA PROVIDER;  Location:  OR      RIGHT HEART CATH MEASUREMENTS RECORDED N/A 2020    Procedure: Right Heart Cath;  Surgeon: Senthil Silva MD;  Location:  HEART CARDIAC CATH LAB     HC REMOVE TONSILS/ADENOIDS,<13 Y/O      T & A <11y.o.     IR CHEST TUBE DRAIN TUNNELED LEFT  2020     IR CHEST TUBE PLACEMENT NON-TUNNELLED LEFT  2020     IR CHEST TUBE REMOVAL NON TUNNELED LEFT  2020     IR CHEST TUBE REMOVAL TUNNELED LEFT  2020     LAPAROSCOPIC CHOLECYSTECTOMY N/A 2020    Procedure: CHOLECYSTECTOMY, LAPAROSCOPIC;  Surgeon: Annette Lambert MD;  Location:  OR     LAPAROSCOPIC HERNIORRHAPHY INGUINAL BILATERAL Bilateral 10/27/2020    Procedure: LAPAROSCOPIC BILATERAL INGUINAL HERNIA REPAIR WITH MESH;  Surgeon: Brian Garsia MD;  Location:  OR     Family History   Problem Relation Age of Onset     Family History Negative Mother          age 71     Family History Negative Father          age 70     Diabetes Brother         alive age 77     Diabetes Brother         alive age 76     Family History  Negative Brother              Family History Negative Brother              Diabetes Sister         alive age74     Family History Negative Sister          age 86     Heart Disease Sister              Family History Negative Sister              Family History Negative Sister              Diabetes Sister      Diabetes Sister      Diabetes Brother      Diabetes Brother      Social History     Socioeconomic History     Marital status:      Spouse name: Lianna     Number of children: 4     Years of education: 16     Highest education level: Not on file   Occupational History     Occupation: [employer]     Employer: [employer]    Tobacco Use     Smoking status: Former Smoker     Packs/day: 0.20     Years: 40.00     Pack years: 8.00     Types: Cigarettes     Start date:      Quit date: 2008     Years since quittin.8     Smokeless tobacco: Never Used   Substance and Sexual Activity     Alcohol use: Not Currently     Alcohol/week: 35.0 standard drinks     Drug use: Not Currently     Sexual activity: Not on file   Other Topics Concern     Parent/sibling w/ CABG, MI or angioplasty before 65F 55M? Not Asked   Social History Narrative     Not on file     Social Determinants of Health     Financial Resource Strain: Not on file   Food Insecurity: Not on file   Transportation Needs: Not on file   Physical Activity: Not on file   Stress: Not on file   Social Connections: Not on file   Intimate Partner Violence: Not on file   Housing Stability: Not on file           Allergies:   No known drug allergies      KAMILLA Espino [facility]  Pager: [phone]        Thank you for allowing me to participate in the care of your patient.      Sincerely,     KAMILLA Espino [facility]

## 2021-12-08 ENCOUNTER — CARE COORDINATION (OUTPATIENT)
Dept: CARDIOLOGY | Facility: CLINIC | Age: 81
End: 2021-12-08
Payer: COMMERCIAL

## 2021-12-08 NOTE — PROGRESS NOTES
[facility]    [facility] is discontinuing the use of My Health Tracker system, which Tc is currently enrolled in for HF monitoring.      The new system that will be in place will consist of a scale, oximeter, BP cuff, and tablet which will be bluetooth connected. There will be costs for the devices and monthly billing, which is new, and specifics on pricing are not currently known.      Will ask Ame LEW if she'd like Tc to transition to the new system.    Of note, last clinic visit 11/12/21, cT was doing well, no changes were made and orders for follow-up in 3 months placed. In the past several months, he has consistently been within his goal range of 155-163 lbs, occassionally reporting increased symptoms. I'd recommend he graduate from telemonitoring and be given thresholds with which to alert CORE team.    Future Appointments   Date Time Provider Department Center   1/27/2022 11:15 AM Cony Julien DO [facility] Mills RN BSN   12:38 PM 12/08/21

## 2021-12-10 NOTE — PROGRESS NOTES
Okay to disenrolled from my health tracker, he should notify us if outside his goal range of 155-163 pounds, as well as with CHF symptoms.  Thank you.

## 2021-12-14 NOTE — PROGRESS NOTES
[facility]    I spoke to Tc and reviewed Ame LEW's recommendation to 'graduate' him from HF telemonitoring. He was agreeable to plan and we reviewed specific symptoms and weights with which he should alert us.    He shared that they are celebrating 57 years of marriage today with a dinner at [employer]. I wished him well and encouraged him to contact us with questions/concerns.    Future Appointments   Date Time Provider Department Center   1/27/2022 11:15 AM Cony Julien DO [facility] RN BSN   12:10 PM 12/14/21

## 2022-01-19 DIAGNOSIS — I50.33 ACUTE ON CHRONIC HEART FAILURE WITH PRESERVED EJECTION FRACTION (HFPEF) (H): ICD-10-CM

## 2022-01-19 RX ORDER — FUROSEMIDE 40 MG
TABLET ORAL
Qty: 135 TABLET | Refills: 0 | Status: SHIPPED | OUTPATIENT
Start: 2022-01-19 | End: 2022-04-27

## 2022-01-24 ENCOUNTER — TELEPHONE (OUTPATIENT)
Dept: CARDIOLOGY | Facility: CLINIC | Age: 82
End: 2022-01-24
Payer: COMMERCIAL

## 2022-01-24 NOTE — TELEPHONE ENCOUNTER
[facility] Clinic    -Spoke with Sara. The wrong dose was entered. Dose was fixed. Medication should be filled later today. Spoke with Tc who is appreciative of the call back.       Maile Owen RN on 1/24/2022 at 10:12 AM

## 2022-01-27 ENCOUNTER — OFFICE VISIT (OUTPATIENT)
Dept: CARDIOLOGY | Facility: CLINIC | Age: 82
End: 2022-01-27
Payer: COMMERCIAL

## 2022-01-27 VITALS
HEART RATE: 82 BPM | HEIGHT: 69 IN | DIASTOLIC BLOOD PRESSURE: 86 MMHG | WEIGHT: 165.3 LBS | SYSTOLIC BLOOD PRESSURE: 166 MMHG | BODY MASS INDEX: 24.48 KG/M2

## 2022-01-27 DIAGNOSIS — I50.33 ACUTE ON CHRONIC HEART FAILURE WITH PRESERVED EJECTION FRACTION (HFPEF) (H): Primary | ICD-10-CM

## 2022-01-27 DIAGNOSIS — I50.32 CHRONIC DIASTOLIC HEART FAILURE (H): ICD-10-CM

## 2022-01-27 DIAGNOSIS — I10 ESSENTIAL HYPERTENSION: ICD-10-CM

## 2022-01-27 DIAGNOSIS — I50.32 CHRONIC HEART FAILURE WITH PRESERVED EJECTION FRACTION (HFPEF) (H): ICD-10-CM

## 2022-01-27 PROCEDURE — 99213 OFFICE O/P EST LOW 20 MIN: CPT | Performed by: INTERNAL MEDICINE

## 2022-01-27 RX ORDER — CARVEDILOL 12.5 MG/1
12.5 TABLET ORAL DAILY
Qty: 180 TABLET | Refills: 4 | Status: SHIPPED | OUTPATIENT
Start: 2022-01-27 | End: 2022-06-08

## 2022-01-27 RX ORDER — OLOPATADINE HYDROCHLORIDE 1 MG/ML
1 SOLUTION/ DROPS OPHTHALMIC 2 TIMES DAILY
COMMUNITY
End: 2023-01-15

## 2022-01-27 ASSESSMENT — MIFFLIN-ST. JEOR: SCORE: 1440.18

## 2022-01-27 NOTE — PROGRESS NOTES
Service Date: 01/27/2022    REFERRING PROVIDER:  Dr. Jessika Cordoba.    HISTORY OF PRESENT ILLNESS:  Mr. Garcia is a pleasant 81-year-old male with a history of chronic diastolic congestive heart failure, pulmonary hypertension, atrial fibrillation and diabetes.  He is followed in our [facility].  He is here for his annual followup visit today.  Overall, he has been doing well.  He is very disciplined about watching his sodium intake and his weights for fluid retention.  He denies any current symptoms including shortness of breath, orthopnea, PND, dizziness, palpitations and/or lightheadedness or chest pains.  He does have some mild bruising related to the use of Eliquis.  No major bleeding, although he does have some broken blood vessel in his right eye currently.  He did see a retinal specialist and they are just monitoring it for now.  He has noted his blood pressures have been going up, typically at home he has been measuring them in the 150s systolic.    PHYSICAL EXAMINATION:  VITAL SIGNS:  Today in the office, BP was 166/86, pulse was around 82, weight is 165.  At home, it was 162.  Body mass index of 24.  CARDIOVASCULAR:  Tones are irregular with history of atrial fibrillation.  No murmur, gallop or rub is appreciated.  RESPIRATORY:  Lungs are clear posteriorly.  EXTREMITIES:  He has no peripheral edema.    ASSESSMENT AND PLAN:  In summary, Mr. Garcia is a pleasant 81-year-old male with history of chronic diastolic congestive heart failure, atrial fibrillation, pulmonary hypertension and diabetes.  He seems stable from a cardiac perspective on his current regimen.  His blood pressure, however, is elevated, and for this reason, I am going to increase his carvedilol from 6.25 mg daily to 12.5 mg daily.  I have asked him to continue to monitor this, and we will have him follow up in the [facility].R[facility] Clinic in about 3 months.  He will continue to watch his weights daily and his sodium intake.    Please feel  free to contact me with any questions you have in regards to his care.    Cony Julien DO    cc:  Jessika Cordoba MD  [facility]  [address] [place] [address], [address] [place] [id number]    Cony Julien DO        D: 2022   T: 2022   MT: fabienne    Name:     CEDRICK PHILLIPS  MRN:      [id number]        Account:      [id number]   :      1940           Service Date: 2022       Document: [id number]

## 2022-01-27 NOTE — LETTER
2022    Jessika Cordoba  [address]  [place] [address]    RE: Tc Garcia       Dear Colleague,     I had the pleasure of seeing Tc Garcia in the [facility].  HPI and Plan:   See dictation    Orders Placed This Encounter   Procedures     Follow-Up with Cardiology [facility]       Orders Placed This Encounter   Medications     HUMALOG 100 UNIT/ML injection     olopatadine (PATANOL) 0.1 % ophthalmic solution     Si drop 2 times daily     carvedilol (COREG) 12.5 MG tablet     Sig: Take 1 tablet (12.5 mg) by mouth daily     Dispense:  180 tablet     Refill:  4       Medications Discontinued During This Encounter   Medication Reason     carvedilol (COREG) 6.25 MG tablet Reorder         Encounter Diagnoses   Name Primary?     Chronic heart failure with preserved ejection fraction (HFpEF) (H)      Acute on chronic heart failure with preserved ejection fraction (HFpEF) (H) Yes     Essential hypertension      Chronic diastolic heart failure (H)        CURRENT MEDICATIONS:  Current Outpatient Medications   Medication Sig Dispense Refill     apixaban ANTICOAGULANT (ELIQUIS) 5 MG tablet Take 1 tablet (5 mg) by mouth 2 times daily 60 tablet 5     carvedilol (COREG) 12.5 MG tablet Take 1 tablet (12.5 mg) by mouth daily 180 tablet 4     furosemide (LASIX) 40 MG tablet Take 1 1/2 tablets(60mg) by mouth every day 135 tablet 0     glucagon 1 MG kit 1 mg as needed for low blood sugar       HUMALOG 100 UNIT/ML injection        losartan (COZAAR) 25 MG tablet Take 1 tablet (25 mg) by mouth daily 90 tablet 3     LOVASTATIN 20 MG PO TABS 1 TABLET DAILY AT DINNER 90 Tab 0     nystatin (MYCOSTATIN) 479638 UNIT/GM external cream Apply topically 2 times daily as needed        olopatadine (PATANOL) 0.1 % ophthalmic solution 1 drop 2 times daily       verapamil ER (CALAN-SR) 120 MG CR tablet Take 1 tablet (120 mg) by mouth At Bedtime 90 tablet 3     HYDROcodone-acetaminophen (NORCO) 5-325 MG  tablet Take 1 tablet by mouth every 6 hours as needed for severe pain (Patient not taking: Reported on 11/12/2021)         ALLERGIES     Allergies   Allergen Reactions     No Known Drug Allergies        PAST MEDICAL HISTORY:  Past Medical History:   Diagnosis Date     Anemia      Anemia of chronic disease 5/14/2020     Back pain      CKD (chronic kidney disease) stage 3, GFR 30-59 ml/min (H)      CRF (chronic renal failure), stage 3  5/14/2020     Fall 11/2019    suffered multiple left rib fractures, C3 and T2 fractures     Mixed hyperlipidemia 7/7/2004     Paroxysmal atrial fibrillation (H)      Personal history of colonic polyps 1972    gets colonoscopy every five years, due in 2006     Pleural effusion on left 11/2019    after multiple rib fractures     Pulmonary hypertension (H) 5/10/2020    Added automatically from request for surgery [id number]     Recurrent Left Pleural effusion -- S/P Pleurex Cath 5/12/20 12/30/2019     Rosacea 1989     Type II or unspecified type diabetes mellitus without mention of complication, not stated as uncontrolled 1999     Unspecified essential hypertension 1994       PAST SURGICAL HISTORY:  Past Surgical History:   Procedure Laterality Date     ANESTHESIA CARDIOVERSION N/A 2/3/2020    Procedure: ANESTHESIA, FOR CARDIOVERSION;  Surgeon: GENERIC ANESTHESIA PROVIDER;  Location:  OR      RIGHT HEART CATH MEASUREMENTS RECORDED N/A 5/13/2020    Procedure: Right Heart Cath;  Surgeon: Senthil Silva MD;  Location:  HEART CARDIAC CATH LAB     HC REMOVE TONSILS/ADENOIDS,<13 Y/O      T & A <11y.o.     IR CHEST TUBE DRAIN TUNNELED LEFT  5/12/2020     IR CHEST TUBE PLACEMENT NON-TUNNELLED LEFT  7/11/2020     IR CHEST TUBE REMOVAL NON TUNNELED LEFT  7/17/2020     IR CHEST TUBE REMOVAL TUNNELED LEFT  7/11/2020     LAPAROSCOPIC CHOLECYSTECTOMY N/A 11/22/2020    Procedure: CHOLECYSTECTOMY, LAPAROSCOPIC;  Surgeon: Annette Lambert MD;  Location:  OR     LAPAROSCOPIC HERNIORRHAPHY  INGUINAL BILATERAL Bilateral 10/27/2020    Procedure: LAPAROSCOPIC BILATERAL INGUINAL HERNIA REPAIR WITH MESH;  Surgeon: Brian Garsia MD;  Location: [facility] OR       FAMILY HISTORY:  Family History   Problem Relation Age of Onset     Family History Negative Mother          age 71     Family History Negative Father          age 70     Diabetes Brother         alive age 77     Diabetes Brother         alive age 76     Family History Negative Brother              Family History Negative Brother              Diabetes Sister         alive age74     Family History Negative Sister          age 86     Heart Disease Sister              Family History Negative Sister              Family History Negative Sister              Diabetes Sister      Diabetes Sister      Diabetes Brother      Diabetes Brother        SOCIAL HISTORY:  Social History     Socioeconomic History     Marital status:      Spouse name: Lianna     Number of children: 4     Years of education: 16     Highest education level: None   Occupational History     Occupation: [employer]     Employer: [employer]    Tobacco Use     Smoking status: Former Smoker     Packs/day: 0.20     Years: 40.00     Pack years: 8.00     Types: Cigarettes     Start date:      Quit date: 2008     Years since quittin.0     Smokeless tobacco: Never Used   Substance and Sexual Activity     Alcohol use: Not Currently     Alcohol/week: 35.0 standard drinks     Drug use: Not Currently     Sexual activity: None   Other Topics Concern     Parent/sibling w/ CABG, MI or angioplasty before 65F 55M? Not Asked   Social History Narrative     None     Social Determinants of Health     Financial Resource Strain: Not on file   Food Insecurity: Not on file   Transportation Needs: Not on file   Physical Activity: Not on file   Stress: Not on file   Social Connections: Not on file   Intimate Partner  "Violence: Not on file   Housing Stability: Not on file       Review of Systems:  Skin:  Negative       Eyes:  Positive for redness;glasses    ENT:  Negative      Respiratory:  Negative       Cardiovascular:  Negative;palpitations;chest pain;syncope or near-syncope;cyanosis;fatigue;edema;lightheadedness;dizziness      Gastroenterology: Negative      Genitourinary:  Negative      Musculoskeletal:  Positive for back pain    Neurologic:  Negative      Psychiatric:  Negative      Heme/Lymph/Imm:  Positive for easy bruising    Endocrine:  Positive for diabetes      Physical Exam:  Vitals: BP (!) 166/86 (BP Location: Right arm, Cuff Size: Adult Large)   Pulse 82   Ht 1.753 m (5' 9\")   Wt 75 kg (165 lb 4.8 oz)   BMI 24.41 kg/m      Constitutional:  cooperative;in no acute distress        Skin:  warm and dry to the touch bruises present        Head:  normocephalic        Eyes:  pupils equal and round        Lymph:      ENT:  no pallor or cyanosis        Neck:  no carotid bruit        Respiratory:  clear to auscultation;normal symmetry    Diminished on posterior left side otherwise clear to auscultation    Cardiac:   irregularly irregular rhythm                pulses below the femoral arteries are diminished                                      GI:  abdomen soft obese      Extremities and Muscular Skeletal:  no edema;no deformities, clubbing, cyanosis, erythema observed              Neurological:  no gross motor deficits;affect appropriate        Psych:  Alert and Oriented x 3        CC  Ame Mejía PA-C  [address]  [place] [address]      Thank you for allowing me to participate in the care of your patient.      Sincerely,     Cony Julien DO     [facility]  cc:   Ame Mejía PA-C  [address]  [place]  [place] [address]        "

## 2022-01-27 NOTE — PROGRESS NOTES
HPI and Plan:   See dictation    Orders Placed This Encounter   Procedures     Follow-Up with Cardiology [facility]       Orders Placed This Encounter   Medications     HUMALOG 100 UNIT/ML injection     olopatadine (PATANOL) 0.1 % ophthalmic solution     Si drop 2 times daily     carvedilol (COREG) 12.5 MG tablet     Sig: Take 1 tablet (12.5 mg) by mouth daily     Dispense:  180 tablet     Refill:  4       Medications Discontinued During This Encounter   Medication Reason     carvedilol (COREG) 6.25 MG tablet Reorder         Encounter Diagnoses   Name Primary?     Chronic heart failure with preserved ejection fraction (HFpEF) (H)      Acute on chronic heart failure with preserved ejection fraction (HFpEF) (H) Yes     Essential hypertension      Chronic diastolic heart failure (H)        CURRENT MEDICATIONS:  Current Outpatient Medications   Medication Sig Dispense Refill     apixaban ANTICOAGULANT (ELIQUIS) 5 MG tablet Take 1 tablet (5 mg) by mouth 2 times daily 60 tablet 5     carvedilol (COREG) 12.5 MG tablet Take 1 tablet (12.5 mg) by mouth daily 180 tablet 4     furosemide (LASIX) 40 MG tablet Take 1 1/2 tablets(60mg) by mouth every day 135 tablet 0     glucagon 1 MG kit 1 mg as needed for low blood sugar       HUMALOG 100 UNIT/ML injection        losartan (COZAAR) 25 MG tablet Take 1 tablet (25 mg) by mouth daily 90 tablet 3     LOVASTATIN 20 MG PO TABS 1 TABLET DAILY AT DINNER 90 Tab 0     nystatin (MYCOSTATIN) [id number] UNIT/GM external cream Apply topically 2 times daily as needed        olopatadine (PATANOL) 0.1 % ophthalmic solution 1 drop 2 times daily       verapamil ER (CALAN-SR) 120 MG CR tablet Take 1 tablet (120 mg) by mouth At Bedtime 90 tablet 3     HYDROcodone-acetaminophen (NORCO) 5-325 MG tablet Take 1 tablet by mouth every 6 hours as needed for severe pain (Patient not taking: Reported on 2021)         ALLERGIES     Allergies   Allergen Reactions     No Known Drug Allergies        PAST  MEDICAL HISTORY:  Past Medical History:   Diagnosis Date     Anemia      Anemia of chronic disease 5/14/2020     Back pain      CKD (chronic kidney disease) stage 3, GFR 30-59 ml/min (H)      CRF (chronic renal failure), stage 3  5/14/2020     Fall 11/2019    suffered multiple left rib fractures, C3 and T2 fractures     Mixed hyperlipidemia 7/7/2004     Paroxysmal atrial fibrillation (H)      Personal history of colonic polyps 1972    gets colonoscopy every five years, due in 2006     Pleural effusion on left 11/2019    after multiple rib fractures     Pulmonary hypertension (H) 5/10/2020    Added automatically from request for surgery [id number]     Recurrent Left Pleural effusion -- S/P Pleurex Cath 5/12/20 12/30/2019     Rosacea 1989     Type II or unspecified type diabetes mellitus without mention of complication, not stated as uncontrolled 1999     Unspecified essential hypertension 1994       PAST SURGICAL HISTORY:  Past Surgical History:   Procedure Laterality Date     ANESTHESIA CARDIOVERSION N/A 2/3/2020    Procedure: ANESTHESIA, FOR CARDIOVERSION;  Surgeon: GENERIC ANESTHESIA PROVIDER;  Location:  OR      RIGHT HEART CATH MEASUREMENTS RECORDED N/A 5/13/2020    Procedure: Right Heart Cath;  Surgeon: Senthil Silva MD;  Location:  HEART CARDIAC CATH LAB     HC REMOVE TONSILS/ADENOIDS,<13 Y/O      T & A <11y.o.     IR CHEST TUBE DRAIN TUNNELED LEFT  5/12/2020     IR CHEST TUBE PLACEMENT NON-TUNNELLED LEFT  7/11/2020     IR CHEST TUBE REMOVAL NON TUNNELED LEFT  7/17/2020     IR CHEST TUBE REMOVAL TUNNELED LEFT  7/11/2020     LAPAROSCOPIC CHOLECYSTECTOMY N/A 11/22/2020    Procedure: CHOLECYSTECTOMY, LAPAROSCOPIC;  Surgeon: Annette Lambert MD;  Location:  OR     LAPAROSCOPIC HERNIORRHAPHY INGUINAL BILATERAL Bilateral 10/27/2020    Procedure: LAPAROSCOPIC BILATERAL INGUINAL HERNIA REPAIR WITH MESH;  Surgeon: Brian Garsia MD;  Location:  OR       FAMILY HISTORY:  Family History    Problem Relation Age of Onset     Family History Negative Mother          age 71     Family History Negative Father          age 70     Diabetes Brother         alive age 77     Diabetes Brother         alive age 76     Family History Negative Brother              Family History Negative Brother              Diabetes Sister         alive age76     Family History Negative Sister          age 86     Heart Disease Sister              Family History Negative Sister              Family History Negative Sister              Diabetes Sister      Diabetes Sister      Diabetes Brother      Diabetes Brother        SOCIAL HISTORY:  Social History     Socioeconomic History     Marital status:      Spouse name: Lianna     Number of children: 4     Years of education: 16     Highest education level: None   Occupational History     Occupation: [employer]     Employer: [employer]    Tobacco Use     Smoking status: Former Smoker     Packs/day: 0.20     Years: 40.00     Pack years: 8.00     Types: Cigarettes     Start date:      Quit date: 2008     Years since quittin.0     Smokeless tobacco: Never Used   Substance and Sexual Activity     Alcohol use: Not Currently     Alcohol/week: 35.0 standard drinks     Drug use: Not Currently     Sexual activity: None   Other Topics Concern     Parent/sibling w/ CABG, MI or angioplasty before 65F 55M? Not Asked   Social History Narrative     None     Social Determinants of Health     Financial Resource Strain: Not on file   Food Insecurity: Not on file   Transportation Needs: Not on file   Physical Activity: Not on file   Stress: Not on file   Social Connections: Not on file   Intimate Partner Violence: Not on file   Housing Stability: Not on file       Review of Systems:  Skin:  Negative       Eyes:  Positive for redness;glasses    ENT:  Negative      Respiratory:  Negative       Cardiovascular:   "Negative;palpitations;chest pain;syncope or near-syncope;cyanosis;fatigue;edema;lightheadedness;dizziness      Gastroenterology: Negative      Genitourinary:  Negative      Musculoskeletal:  Positive for back pain    Neurologic:  Negative      Psychiatric:  Negative      Heme/Lymph/Imm:  Positive for easy bruising    Endocrine:  Positive for diabetes      Physical Exam:  Vitals: BP (!) 166/86 (BP Location: Right arm, Cuff Size: Adult Large)   Pulse 82   Ht 1.753 m (5' 9\")   Wt 75 kg (165 lb 4.8 oz)   BMI 24.41 kg/m      Constitutional:  cooperative;in no acute distress        Skin:  warm and dry to the touch bruises present        Head:  normocephalic        Eyes:  pupils equal and round        Lymph:      ENT:  no pallor or cyanosis        Neck:  no carotid bruit        Respiratory:  clear to auscultation;normal symmetry    Diminished on posterior left side otherwise clear to auscultation    Cardiac:   irregularly irregular rhythm                pulses below the femoral arteries are diminished                                      GI:  abdomen soft obese      Extremities and Muscular Skeletal:  no edema;no deformities, clubbing, cyanosis, erythema observed              Neurological:  no gross motor deficits;affect appropriate        Psych:  Alert and Oriented x 3          CC  Ame Mejía PA-C  [id number]  [place] [address]                  "

## 2022-02-13 ENCOUNTER — HEALTH MAINTENANCE LETTER (OUTPATIENT)
Age: 82
End: 2022-02-13

## 2022-02-17 PROBLEM — E11.10 DKA (DIABETIC KETOACIDOSIS) (H): Status: ACTIVE | Noted: 2020-05-21

## 2022-02-17 PROBLEM — I48.91 UNSPECIFIED ATRIAL FIBRILLATION (H): Status: ACTIVE | Noted: 2020-05-14

## 2022-04-27 DIAGNOSIS — I50.33 ACUTE ON CHRONIC HEART FAILURE WITH PRESERVED EJECTION FRACTION (HFPEF) (H): ICD-10-CM

## 2022-04-27 RX ORDER — FUROSEMIDE 40 MG
TABLET ORAL
Qty: 135 TABLET | Refills: 0 | Status: SHIPPED | OUTPATIENT
Start: 2022-04-27 | End: 2022-08-04

## 2022-05-18 ENCOUNTER — TELEPHONE (OUTPATIENT)
Dept: CARDIOLOGY | Facility: CLINIC | Age: 82
End: 2022-05-18
Payer: COMMERCIAL

## 2022-05-18 NOTE — TELEPHONE ENCOUNTER
Patient reports over the past month he has increased fatigue and decreased energy doing his normal daily activities.  Patient states he has mild LUCIANO.  No swelling in lower extremities, weight has been stable.  Patient reports symptoms have been present for one month.  Patient's last OV with Dr. Julien 1/27/22 at that time was advised to schedule with [facility] for follow up in 3 months, patient did not make appointment.  RN advised patient to call to schedule [facility] follow up.  In the meantime advised to call clinic with weight gain of 3 pounds in 24 hour or 5 pounds in one week.  Patient advised that if significant SOB or chest pain were to occur that he should go to ED.  Patient verbalized understanding and agreement with plan of care.  Staci Bruce RN on 5/18/2022 at 3:55 PM

## 2022-06-08 ENCOUNTER — OFFICE VISIT (OUTPATIENT)
Dept: CARDIOLOGY | Facility: CLINIC | Age: 82
End: 2022-06-08
Payer: COMMERCIAL

## 2022-06-08 ENCOUNTER — LAB (OUTPATIENT)
Dept: LAB | Facility: CLINIC | Age: 82
End: 2022-06-08

## 2022-06-08 VITALS
HEIGHT: 69 IN | OXYGEN SATURATION: 100 % | SYSTOLIC BLOOD PRESSURE: 156 MMHG | WEIGHT: 167.2 LBS | DIASTOLIC BLOOD PRESSURE: 65 MMHG | HEART RATE: 55 BPM | BODY MASS INDEX: 24.76 KG/M2

## 2022-06-08 DIAGNOSIS — I50.33 ACUTE ON CHRONIC HEART FAILURE WITH PRESERVED EJECTION FRACTION (HFPEF) (H): ICD-10-CM

## 2022-06-08 DIAGNOSIS — I50.32 CHRONIC DIASTOLIC HEART FAILURE (H): Primary | ICD-10-CM

## 2022-06-08 DIAGNOSIS — I10 ESSENTIAL HYPERTENSION: ICD-10-CM

## 2022-06-08 DIAGNOSIS — I50.32 CHRONIC DIASTOLIC HEART FAILURE (H): ICD-10-CM

## 2022-06-08 LAB
ANION GAP SERPL CALCULATED.3IONS-SCNC: 7 MMOL/L (ref 3–14)
BUN SERPL-MCNC: 17 MG/DL (ref 7–30)
CALCIUM SERPL-MCNC: 9 MG/DL (ref 8.5–10.1)
CHLORIDE BLD-SCNC: 104 MMOL/L (ref 94–109)
CO2 SERPL-SCNC: 30 MMOL/L (ref 20–32)
CREAT SERPL-MCNC: 1.62 MG/DL (ref 0.66–1.25)
ERYTHROCYTE [DISTWIDTH] IN BLOOD BY AUTOMATED COUNT: 14.1 % (ref 10–15)
GFR SERPL CREATININE-BSD FRML MDRD: 42 ML/MIN/1.73M2
GLUCOSE BLD-MCNC: 87 MG/DL (ref 70–99)
HCT VFR BLD AUTO: 39.1 % (ref 40–53)
HGB BLD-MCNC: 12.4 G/DL (ref 13.3–17.7)
MCH RBC QN AUTO: 28.4 PG (ref 26.5–33)
MCHC RBC AUTO-ENTMCNC: 31.7 G/DL (ref 31.5–36.5)
MCV RBC AUTO: 90 FL (ref 78–100)
NT-PROBNP SERPL-MCNC: 2832 PG/ML (ref 0–1800)
PLATELET # BLD AUTO: 205 10E3/UL (ref 150–450)
POTASSIUM BLD-SCNC: 3.5 MMOL/L (ref 3.4–5.3)
RBC # BLD AUTO: 4.37 10E6/UL (ref 4.4–5.9)
SODIUM SERPL-SCNC: 141 MMOL/L (ref 133–144)
WBC # BLD AUTO: 8.5 10E3/UL (ref 4–11)

## 2022-06-08 PROCEDURE — 36415 COLL VENOUS BLD VENIPUNCTURE: CPT | Performed by: PHYSICIAN ASSISTANT

## 2022-06-08 PROCEDURE — 85027 COMPLETE CBC AUTOMATED: CPT | Performed by: PHYSICIAN ASSISTANT

## 2022-06-08 PROCEDURE — 83880 ASSAY OF NATRIURETIC PEPTIDE: CPT | Performed by: PHYSICIAN ASSISTANT

## 2022-06-08 PROCEDURE — 99215 OFFICE O/P EST HI 40 MIN: CPT | Performed by: PHYSICIAN ASSISTANT

## 2022-06-08 PROCEDURE — 80048 BASIC METABOLIC PNL TOTAL CA: CPT | Performed by: PHYSICIAN ASSISTANT

## 2022-06-08 RX ORDER — CARVEDILOL 6.25 MG/1
6.25 TABLET ORAL 2 TIMES DAILY WITH MEALS
Qty: 180 TABLET | Refills: 3 | Status: SHIPPED | OUTPATIENT
Start: 2022-06-08 | End: 2022-12-06

## 2022-06-08 RX ORDER — AMLODIPINE BESYLATE 5 MG/1
5 TABLET ORAL DAILY
Qty: 90 TABLET | Refills: 3 | Status: SHIPPED | OUTPATIENT
Start: 2022-06-08 | End: 2022-06-10

## 2022-06-08 NOTE — PROGRESS NOTES
Cardiology Clinic Progress Note    Tc Garcia MRN# [id number]   YOB: 1940 Age: 82 year old   Primary cardiologist: Dr. Julien         Assessment and Plan:     In summary, Tc Garcia presents to the [facility] for follow up on chronic HFpEF. He complains of significant fatigue with his ADL's for the past month or so. He is quite bradycardic in atrial fibrillation and also has uncontrolled hypertension.     Plan:  - Change Coreg to 6.25 mg BID (is taking 12.5 mg once daily for unclear reasons).  - STOP verapamil. START amlodipine 5 mg daily.  - Labs today including BMP, CBC, and proBNP.     Follow-up:  -As scheduled with Dr. Julien next month.    Addendum: Pt saw Dr. Chandler day after our visit and instead instructed him to increase Losartan from 50 mg daily to 100 mg daily, and not start amlodipine. If this is insufficient in obtaining BP control, he would then recommend changing from Losartan to Irbesartan 300 mg daily. I have updated the med list to reflect this.         History of Presenting Illness:      Tc Garcia is a pleasant 82 year old patient who presents today for a [facility] follow up visit.     The patient has a complex history of the following -   # Chronic HFpEF, with multiple admissions in 2020. Enrolled in [facility]HT with goal wt 155-163 lbs.  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 Has declined pulmonary rehab.   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 Anticoagulated with Eliquis.  # Poorly-controlled DMII  # HTN  # HLP  # CKD with variable renal function response to diuretics, follows with [facility]. Baseline creat ~ 1.3.  # Anemia of chronic disease,  requiring intermittent Fe infusions, followed by nephrology  # Obesity  # Hx of medication confusion and non-compliance, self-adjusts diuretics frequently.  # Social - Lives with wife, Radha. Sedentary. High sodium diet, medication noncompliance, some concern over cognitive function and medical literacy.    Please see my Clinic note dated 11/12/2021 for Tc's detailed history.  In brief, Tc had multiple admissions in the summer 2020 for acute HFpEF and a reaccumulating pleural effusion.  Since then, he has been followed closely in the [facility], and has remained out of the hospital until March 2021 when he was admitted for dehydration after he had 9 teeth extracted.  His losartan, Lasix, and spironolactone were all held, and then slowly readministered.  However after that, his spironolactone was discontinued completely due to hyperkalemia.  Of note, his creatinine has been quite variable, ranging from 1.3 - 1.9 over the past year.  He is followed by nephrology.  Most recently, Tc saw Dr. Julien in January of this year, and continued to do well from a cardiac standpoint, however his blood pressure was uncontrolled and therefore carvedilol was increased to 12.5 mg daily.    A couple weeks ago, he called to report increased fatigue and decreased energy doing his normal daily activities for about the prior month.  He comes in today to see me for evaluation of this.  At baseline, he walks the dog 1/2 mile daily, does yard work and house work routinely without limiting dyspnea. At this point, he has to stop and rest after walking the dog after 1/4 mile. No jerome dyspnea, just fatigue. Sleeping well, appetite feels normal without abdominal bloating. He denies chest pain, orthopnea, edema, claudication, palpitations, recurrent near syncope or syncope. No issues with bleeding. Most recent labs are from November 2021, showing a creatinine of 1.5 and a BUN of 24, potassium of 4.3. Unfortunately, no labs are drawn for  "visit today. Home wt is stable ~ 160 lbs, which has been his goal.  On exam, BP is elevated in the 150's systolic. He is quite bradycardic in atrial fib. He has soft crackles on the LLL, but otherwise CTAB. No peripheral edema.           Review of Systems:     12-pt ROS is negative except for as noted in the HPI.          Physical Exam:     Vitals: BP (!) 156/65   Pulse 55   Ht 1.753 m (5' 9\")   Wt 75.8 kg (167 lb 3.2 oz)   SpO2 100%   BMI 24.69 kg/m    Wt Readings from Last 10 Encounters:   06/08/22 75.8 kg (167 lb 3.2 oz)   01/27/22 75 kg (165 lb 4.8 oz)   11/12/21 74.4 kg (164 lb)   07/19/21 73.5 kg (162 lb)   06/23/21 70.6 kg (155 lb 11.2 oz)   04/19/21 71.3 kg (157 lb 3.2 oz)   03/19/21 69 kg (152 lb 1.6 oz)   12/09/20 70.1 kg (154 lb 8 oz)   11/20/20 68 kg (150 lb)   11/16/20 69.1 kg (152 lb 4.8 oz)       Constitutional:  Patient is pleasant, alert, cooperative, and in NAD.  HEENT:  NCAT. PERRLA. EOM's intact.   Neck:  CVP appears normal. No carotid bruits.   Pulmonary: Normal respiratory effort. Chronic LLL blunted lung sounds. No crackles.   Cardiac: Irregularly irregular, variable S1, no S3/S4, no murmur or rub.   Abdomen:  Non-tender abdomen, no hepatosplenomegaly appreciated.   Vascular: Pulses in the upper and lower extremities are 2+ and equal bilaterally.  Extremities: No edema, erythema, cyanosis or tenderness appreciated.  Skin:  No rashes or lesions appreciated.   Neurological:  No gross motor or sensory deficits.   Psych: Appropriate affect.        Data:     Labs reviewed:  Recent Labs   Lab Test 03/17/21  0752 02/09/21  1306 12/01/20  1434 09/10/20  1359 09/02/20  1044 08/10/20  1027 06/16/20  1313 05/13/20  0553 05/12/20  0541 05/11/20  0542   LDL  --   --   --   --   --   --   --  43  --   --    HDL  --   --   --   --   --   --   --  60  --   --    NHDL  --   --   --   --   --   --   --  56  --   --    CHOL  --   --   --   --   --   --   --  116  --   --    TRIG  --   --   --   --   --   -- "   --  65  --   --    TSH 0.94  --   --   --   --  4.75*  --   --  3.47  --    NTBNP  --  3,149* 3,052* 2,611*   < >  --    < >  --   --   --    IRON  --   --   --   --   --   --   --   --   --  16*   FEB  --   --   --   --   --   --   --   --   --  161*   IRONSAT  --   --   --   --   --   --   --   --   --  10*   ERNESTO  --   --   --   --   --   --   --   --   --  142    < > = values in this interval not displayed.       Lab Results   Component Value Date    WBC 10.4 06/15/2021    RBC 4.08 (A) 06/15/2021    HGB 11.7 (A) 06/15/2021    HCT 36.3 (A) 06/15/2021    MCV 89 06/15/2021    MCH 28.7 06/15/2021    MCHC 32.2 06/15/2021    RDW 14.2 06/15/2021     06/15/2021       Lab Results   Component Value Date     07/12/2021    POTASSIUM 4.0 07/12/2021    CHLORIDE 102 11/04/2021    CHLORIDE 100 07/12/2021    CO2 26 07/12/2021    ANIONGAP 4 03/18/2021     (A) 07/12/2021    BUN 18 07/12/2021    BUN 10 07/12/2021    CR 1.73 (A) 07/12/2021    GFRESTIMATED 40 (L) 06/21/2021    GFRESTBLACK 46 (L) 06/21/2021    LLOYD 9.3 07/12/2021      Lab Results   Component Value Date    AST 12 03/22/2021    ALT 5 03/22/2021       Lab Results   Component Value Date    A1C 7.7 (H) 11/20/2020       Lab Results   Component Value Date    INR 1.50 (H) 11/21/2020    INR 1.18 (H) 07/17/2020           Problem List:     Patient Active Problem List   Diagnosis     DM type 2, Hgb A1C 8.2 on 4/25/20     Mixed hyperlipidemia     Essential hypertension     Pain in limb     Plantar fascial fibromatosis     HYPERLIPIDEMIA LDL GOAL <100     Hemothorax on left     Recurrent Left Pleural effusion -- S/P Pleurex Cath 5/12/20     ABBIE (acute kidney injury) (H)     Acute respiratory failure with hypoxia (H)     Pulmonary hypertension (H)     CRF (chronic renal failure), stage 3      Anemia of chronic disease     Atrial fibrillation/flutter -- on Eliquis and Amiodarone     DKA (diabetic ketoacidoses)     Anemia in CKD (chronic kidney disease)     Dyspnea      SOB (shortness of breath)     Non-recurrent bilateral inguinal hernia without obstruction or gangrene     Acute cholecystitis     Abdominal pain, generalized     Non-intractable vomiting with nausea, unspecified vomiting type     Alcohol dependence (H)     CHF (congestive heart failure) (H)     Syncope and collapse     Weakness     Postoperative state     Acute kidney injury (H)     Chronic diastolic heart failure (H)           Medications:     Current Outpatient Medications   Medication Sig Dispense Refill     apixaban ANTICOAGULANT (ELIQUIS) 5 MG tablet Take 1 tablet (5 mg) by mouth 2 times daily 60 tablet 5     carvedilol (COREG) 12.5 MG tablet Take 1 tablet (12.5 mg) by mouth daily 180 tablet 4     furosemide (LASIX) 40 MG tablet Take 1 1/2 tablets(60mg) by mouth every day 135 tablet 0     glucagon 1 MG kit 1 mg as needed for low blood sugar       HUMALOG 100 UNIT/ML injection        losartan (COZAAR) 25 MG tablet Take 1 tablet (25 mg) by mouth daily 90 tablet 3     nystatin (MYCOSTATIN) 433947 UNIT/GM external cream Apply topically 2 times daily as needed        olopatadine (PATANOL) 0.1 % ophthalmic solution 1 drop 2 times daily       verapamil ER (CALAN-SR) 120 MG CR tablet Take 1 tablet (120 mg) by mouth At Bedtime 90 tablet 3     HYDROcodone-acetaminophen (NORCO) 5-325 MG tablet Take 1 tablet by mouth every 6 hours as needed for severe pain (Patient not taking: No sig reported)       LOVASTATIN 20 MG PO TABS 1 TABLET DAILY AT DINNER 90 Tab 0           Past Medical History:     Past Medical History:   Diagnosis Date     Anemia      Anemia of chronic disease 5/14/2020     Back pain      CKD (chronic kidney disease) stage 3, GFR 30-59 ml/min (H)      CRF (chronic renal failure), stage 3  5/14/2020     Fall 11/2019    suffered multiple left rib fractures, C3 and T2 fractures     Mixed hyperlipidemia 7/7/2004     Paroxysmal atrial fibrillation (H)      Personal history of colonic polyps 1972    gets  colonoscopy every five years, due in      Pleural effusion on left 2019    after multiple rib fractures     Pulmonary hypertension (H) 5/10/2020    Added automatically from request for surgery [id number]     Recurrent Left Pleural effusion -- S/P Pleurex Cath 2019     Rosacea      Type II or unspecified type diabetes mellitus without mention of complication, not stated as uncontrolled      Unspecified essential hypertension      Past Surgical History:   Procedure Laterality Date     ANESTHESIA CARDIOVERSION N/A 2/3/2020    Procedure: ANESTHESIA, FOR CARDIOVERSION;  Surgeon: GENERIC ANESTHESIA PROVIDER;  Location:  OR     CV RIGHT HEART CATH MEASUREMENTS RECORDED N/A 2020    Procedure: Right Heart Cath;  Surgeon: Senthil Silva MD;  Location:  HEART CARDIAC CATH LAB     HC REMOVE TONSILS/ADENOIDS,<11 Y/O      T & A <11y.o.     IR CHEST TUBE DRAIN TUNNELED LEFT  2020     IR CHEST TUBE PLACEMENT NON-TUNNELLED LEFT  2020     IR CHEST TUBE REMOVAL NON TUNNELED LEFT  2020     IR CHEST TUBE REMOVAL TUNNELED LEFT  2020     LAPAROSCOPIC CHOLECYSTECTOMY N/A 2020    Procedure: CHOLECYSTECTOMY, LAPAROSCOPIC;  Surgeon: Annette Lambert MD;  Location:  OR     LAPAROSCOPIC HERNIORRHAPHY INGUINAL BILATERAL Bilateral 10/27/2020    Procedure: LAPAROSCOPIC BILATERAL INGUINAL HERNIA REPAIR WITH MESH;  Surgeon: Brian Garsia MD;  Location:  OR     Family History   Problem Relation Age of Onset     Family History Negative Mother          age 71     Family History Negative Father          age 70     Diabetes Brother         alive age 77     Diabetes Brother         alive age 76     Family History Negative Brother              Family History Negative Brother              Diabetes Sister         alive age74     Family History Negative Sister          age 86     Heart Disease Sister              Family  History Negative Sister              Family History Negative Sister              Diabetes Sister      Diabetes Sister      Diabetes Brother      Diabetes Brother      Social History     Socioeconomic History     Marital status:      Spouse name: Lianna     Number of children: 4     Years of education: 16     Highest education level: Not on file   Occupational History     Occupation: [employer]     Employer: [employer]    Tobacco Use     Smoking status: Former Smoker     Packs/day: 0.20     Years: 40.00     Pack years: 8.00     Types: Cigarettes     Start date:      Quit date: 2008     Years since quittin.4     Smokeless tobacco: Never Used   Substance and Sexual Activity     Alcohol use: Not Currently     Alcohol/week: 35.0 standard drinks     Drug use: Not Currently     Sexual activity: Not on file   Other Topics Concern     Parent/sibling w/ CABG, MI or angioplasty before 65F 55M? Not Asked   Social History Narrative     Not on file     Social Determinants of Health     Financial Resource Strain: Not on file   Food Insecurity: Not on file   Transportation Needs: Not on file   Physical Activity: Not on file   Stress: Not on file   Social Connections: Not on file   Intimate Partner Violence: Not on file   Housing Stability: Not on file           Allergies:   No known drug allergies      Ame Mejía PA-C  [facility]  Pager: [phone]    Today's clinic visit entailed:  Ordering of each unique test  Prescription drug management  I spent a total of 50 minutes on the day of the visit.   Time spent doing chart review, history and exam, documentation and further activities per the note  Provider  Link to [facility] Help Grid     The level of medical decision making during this visit was of moderate complexity.

## 2022-06-08 NOTE — LETTER
6/8/2022    Jessika Gutierrezchuy Cordoba  [address]  [facility] [address]    RE: Tc Garcia       Dear Colleague,     I had the pleasure of seeing Tc Garcia in the [facility].    Cardiology Clinic Progress Note    Tc Garcia MRN# [id number]   YOB: 1940 Age: 82 year old   Primary cardiologist: Dr. Julien         Assessment and Plan:     In summary, Tc Garcia presents to the [facility] clinic for follow up on chronic HFpEF. He complains of significant fatigue with his ADL's for the past month or so. He is quite bradycardic in atrial fibrillation and also has uncontrolled hypertension.     Plan:  - Change Coreg to 6.25 mg BID (is taking 12.5 mg once daily for unclear reasons).  - STOP verapamil. START amlodipine 5 mg daily.  - Labs today including BMP, CBC, and proBNP.     Follow-up:  -As scheduled with Dr. Julien next month.        History of Presenting Illness:      Tc Gacria is a pleasant 82 year old patient who presents today for a [facility] clinic follow up visit.     The patient has a complex history of the following -   # Chronic HFpEF, with multiple admissions in 2020. Enrolled in MHT with goal wt 155-163 lbs.  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 Has declined pulmonary rehab.   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 Anticoagulated with Eliquis.  # Poorly-controlled DMII  # HTN  # HLP  # CKD with variable renal function response to diuretics, follows with [facility]. Baseline creat ~ 1.3.  # Anemia of chronic disease, requiring intermittent Fe infusions, followed by nephrology  # Obesity  # Hx of medication confusion and non-compliance,  self-adjusts diuretics frequently.  # Social - Lives with wife, Radha. Sedentary. High sodium diet, medication noncompliance, some concern over cognitive function and medical literacy.    Please see my Clinic note dated 11/12/2021 for Tc's detailed history.  In brief, Tc had multiple admissions in the summer 2020 for acute HFpEF and a reaccumulating pleural effusion.  Since then, he has been followed closely in the [facility] clinic, and has remained out of the hospital until March 2021 when he was admitted for dehydration after he had 9 teeth extracted.  His losartan, Lasix, and spironolactone were all held, and then slowly readministered.  However after that, his spironolactone was discontinued completely due to hyperkalemia.  Of note, his creatinine has been quite variable, ranging from 1.3 - 1.9 over the past year.  He is followed by nephrology.  Most recently, Tc saw Dr. Julien in January of this year, and continued to do well from a cardiac standpoint, however his blood pressure was uncontrolled and therefore carvedilol was increased to 12.5 mg daily.    A couple weeks ago, he called to report increased fatigue and decreased energy doing his normal daily activities for about the prior month.  He comes in today to see me for evaluation of this.  At baseline, he walks the dog 1/2 mile daily, does yard work and house work routinely without limiting dyspnea. At this point, he has to stop and rest after walking the dog after 1/4 mile. No jerome dyspnea, just fatigue. Sleeping well, appetite feels normal without abdominal bloating. He denies chest pain, orthopnea, edema, claudication, palpitations, recurrent near syncope or syncope. No issues with bleeding. Most recent labs are from November 2021, showing a creatinine of 1.5 and a BUN of 24, potassium of 4.3. Unfortunately, no labs are drawn for visit today. Home wt is stable ~ 160 lbs, which has been his goal.  On exam, BP is elevated in the 150's systolic. He is  "quite bradycardic in atrial fib. He has soft crackles on the LLL, but otherwise CTAB. No peripheral edema.           Review of Systems:     12-pt ROS is negative except for as noted in the HPI.          Physical Exam:     Vitals: BP (!) 156/65   Pulse 55   Ht 1.753 m (5' 9\")   Wt 75.8 kg (167 lb 3.2 oz)   SpO2 100%   BMI 24.69 kg/m    Wt Readings from Last 10 Encounters:   06/08/22 75.8 kg (167 lb 3.2 oz)   01/27/22 75 kg (165 lb 4.8 oz)   11/12/21 74.4 kg (164 lb)   07/19/21 73.5 kg (162 lb)   06/23/21 70.6 kg (155 lb 11.2 oz)   04/19/21 71.3 kg (157 lb 3.2 oz)   03/19/21 69 kg (152 lb 1.6 oz)   12/09/20 70.1 kg (154 lb 8 oz)   11/20/20 68 kg (150 lb)   11/16/20 69.1 kg (152 lb 4.8 oz)       Constitutional:  Patient is pleasant, alert, cooperative, and in NAD.  HEENT:  NCAT. PERRLA. EOM's intact.   Neck:  CVP appears normal. No carotid bruits.   Pulmonary: Normal respiratory effort. Chronic LLL blunted lung sounds. No crackles.   Cardiac: Irregularly irregular, variable S1, no S3/S4, no murmur or rub.   Abdomen:  Non-tender abdomen, no hepatosplenomegaly appreciated.   Vascular: Pulses in the upper and lower extremities are 2+ and equal bilaterally.  Extremities: No edema, erythema, cyanosis or tenderness appreciated.  Skin:  No rashes or lesions appreciated.   Neurological:  No gross motor or sensory deficits.   Psych: Appropriate affect.        Data:     Labs reviewed:  Recent Labs   Lab Test 03/17/21  0752 02/09/21  1306 12/01/20  1434 09/10/20  1359 09/02/20  1044 08/10/20  1027 06/16/20  1313 05/13/20  0553 05/12/20  0541 05/11/20  0542   LDL  --   --   --   --   --   --   --  43  --   --    HDL  --   --   --   --   --   --   --  60  --   --    NHDL  --   --   --   --   --   --   --  56  --   --    CHOL  --   --   --   --   --   --   --  116  --   --    TRIG  --   --   --   --   --   --   --  65  --   --    TSH 0.94  --   --   --   --  4.75*  --   --  3.47  --    NTBNP  --  3,149* 3,052* 2,611*   < >  --  "   < >  --   --   --    IRON  --   --   --   --   --   --   --   --   --  16*   FEB  --   --   --   --   --   --   --   --   --  161*   IRONSAT  --   --   --   --   --   --   --   --   --  10*   ERNESTO  --   --   --   --   --   --   --   --   --  142    < > = values in this interval not displayed.       Lab Results   Component Value Date    WBC 10.4 06/15/2021    RBC 4.08 (A) 06/15/2021    HGB 11.7 (A) 06/15/2021    HCT 36.3 (A) 06/15/2021    MCV 89 06/15/2021    MCH 28.7 06/15/2021    MCHC 32.2 06/15/2021    RDW 14.2 06/15/2021     06/15/2021       Lab Results   Component Value Date     07/12/2021    POTASSIUM 4.0 07/12/2021    CHLORIDE 102 11/04/2021    CHLORIDE 100 07/12/2021    CO2 26 07/12/2021    ANIONGAP 4 03/18/2021     (A) 07/12/2021    BUN 18 07/12/2021    BUN 10 07/12/2021    CR 1.73 (A) 07/12/2021    GFRESTIMATED 40 (L) 06/21/2021    GFRESTBLACK 46 (L) 06/21/2021    LLOYD 9.3 07/12/2021      Lab Results   Component Value Date    AST 12 03/22/2021    ALT 5 03/22/2021       Lab Results   Component Value Date    A1C 7.7 (H) 11/20/2020       Lab Results   Component Value Date    INR 1.50 (H) 11/21/2020    INR 1.18 (H) 07/17/2020           Problem List:     Patient Active Problem List   Diagnosis     DM type 2, Hgb A1C 8.2 on 4/25/20     Mixed hyperlipidemia     Essential hypertension     Pain in limb     Plantar fascial fibromatosis     HYPERLIPIDEMIA LDL GOAL <100     Hemothorax on left     Recurrent Left Pleural effusion -- S/P Pleurex Cath 5/12/20     ABBIE (acute kidney injury) (H)     Acute respiratory failure with hypoxia (H)     Pulmonary hypertension (H)     CRF (chronic renal failure), stage 3      Anemia of chronic disease     Atrial fibrillation/flutter -- on Eliquis and Amiodarone     DKA (diabetic ketoacidoses)     Anemia in CKD (chronic kidney disease)     Dyspnea     SOB (shortness of breath)     Non-recurrent bilateral inguinal hernia without obstruction or gangrene     Acute  cholecystitis     Abdominal pain, generalized     Non-intractable vomiting with nausea, unspecified vomiting type     Alcohol dependence (H)     CHF (congestive heart failure) (H)     Syncope and collapse     Weakness     Postoperative state     Acute kidney injury (H)     Chronic diastolic heart failure (H)           Medications:     Current Outpatient Medications   Medication Sig Dispense Refill     apixaban ANTICOAGULANT (ELIQUIS) 5 MG tablet Take 1 tablet (5 mg) by mouth 2 times daily 60 tablet 5     carvedilol (COREG) 12.5 MG tablet Take 1 tablet (12.5 mg) by mouth daily 180 tablet 4     furosemide (LASIX) 40 MG tablet Take 1 1/2 tablets(60mg) by mouth every day 135 tablet 0     glucagon 1 MG kit 1 mg as needed for low blood sugar       HUMALOG 100 UNIT/ML injection        losartan (COZAAR) 25 MG tablet Take 1 tablet (25 mg) by mouth daily 90 tablet 3     nystatin (MYCOSTATIN) 597409 UNIT/GM external cream Apply topically 2 times daily as needed        olopatadine (PATANOL) 0.1 % ophthalmic solution 1 drop 2 times daily       verapamil ER (CALAN-SR) 120 MG CR tablet Take 1 tablet (120 mg) by mouth At Bedtime 90 tablet 3     HYDROcodone-acetaminophen (NORCO) 5-325 MG tablet Take 1 tablet by mouth every 6 hours as needed for severe pain (Patient not taking: No sig reported)       LOVASTATIN 20 MG PO TABS 1 TABLET DAILY AT DINNER 90 Tab 0           Past Medical History:     Past Medical History:   Diagnosis Date     Anemia      Anemia of chronic disease 5/14/2020     Back pain      CKD (chronic kidney disease) stage 3, GFR 30-59 ml/min (H)      CRF (chronic renal failure), stage 3  5/14/2020     Fall 11/2019    suffered multiple left rib fractures, C3 and T2 fractures     Mixed hyperlipidemia 7/7/2004     Paroxysmal atrial fibrillation (H)      Personal history of colonic polyps 1972    gets colonoscopy every five years, due in 2006     Pleural effusion on left 11/2019    after multiple rib fractures     Pulmonary  hypertension (H) 5/10/2020    Added automatically from request for surgery [id number]     Recurrent Left Pleural effusion -- S/P Pleurex Cath 2019     Rosacea      Type II or unspecified type diabetes mellitus without mention of complication, not stated as uncontrolled      Unspecified essential hypertension      Past Surgical History:   Procedure Laterality Date     ANESTHESIA CARDIOVERSION N/A 2/3/2020    Procedure: ANESTHESIA, FOR CARDIOVERSION;  Surgeon: GENERIC ANESTHESIA PROVIDER;  Location:  OR     CV RIGHT HEART CATH MEASUREMENTS RECORDED N/A 2020    Procedure: Right Heart Cath;  Surgeon: Senthil Silva MD;  Location:  HEART CARDIAC CATH LAB     HC REMOVE TONSILS/ADENOIDS,<13 Y/O      T & A <11y.o.     IR CHEST TUBE DRAIN TUNNELED LEFT  2020     IR CHEST TUBE PLACEMENT NON-TUNNELLED LEFT  2020     IR CHEST TUBE REMOVAL NON TUNNELED LEFT  2020     IR CHEST TUBE REMOVAL TUNNELED LEFT  2020     LAPAROSCOPIC CHOLECYSTECTOMY N/A 2020    Procedure: CHOLECYSTECTOMY, LAPAROSCOPIC;  Surgeon: Annette Lambert MD;  Location:  OR     LAPAROSCOPIC HERNIORRHAPHY INGUINAL BILATERAL Bilateral 10/27/2020    Procedure: LAPAROSCOPIC BILATERAL INGUINAL HERNIA REPAIR WITH MESH;  Surgeon: Brian Garsia MD;  Location:  OR     Family History   Problem Relation Age of Onset     Family History Negative Mother          age 71     Family History Negative Father          age 70     Diabetes Brother         alive age 77     Diabetes Brother         alive age 76     Family History Negative Brother              Family History Negative Brother              Diabetes Sister         alive age76     Family History Negative Sister          age 86     Heart Disease Sister              Family History Negative Sister              Family History Negative Sister              Diabetes Sister      Diabetes  Sister      Diabetes Brother      Diabetes Brother      Social History     Socioeconomic History     Marital status:      Spouse name: Lianna     Number of children: 4     Years of education: 16     Highest education level: Not on file   Occupational History     Occupation: [employer]     Employer: [employer]    Tobacco Use     Smoking status: Former Smoker     Packs/day: 0.20     Years: 40.00     Pack years: 8.00     Types: Cigarettes     Start date:      Quit date: 2008     Years since quittin.4     Smokeless tobacco: Never Used   Substance and Sexual Activity     Alcohol use: Not Currently     Alcohol/week: 35.0 standard drinks     Drug use: Not Currently     Sexual activity: Not on file   Other Topics Concern     Parent/sibling w/ CABG, MI or angioplasty before 65F 55M? Not Asked   Social History Narrative     Not on file     Social Determinants of Health     Financial Resource Strain: Not on file   Food Insecurity: Not on file   Transportation Needs: Not on file   Physical Activity: Not on file   Stress: Not on file   Social Connections: Not on file   Intimate Partner Violence: Not on file   Housing Stability: Not on file           Allergies:   No known drug allergies      KAMILLA Espino [facility]  Pager: [phone]    Today's clinic visit entailed:  Ordering of each unique test  Prescription drug management  I spent a total of 50 minutes on the day of the visit.   Time spent doing chart review, history and exam, documentation and further activities per the note  Provider  Link to [facility] Help Grid     The level of medical decision making during this visit was of moderate complexity.      Thank you for allowing me to participate in the care of your patient.      Sincerely,     KAMILLA Espino [facility]  cc:   Ame Mejía PA-C  [address]  [place]  [address]

## 2022-06-08 NOTE — PATIENT INSTRUCTIONS
Today, we discussed the following:   - Medication changes:    Tc, please change your carvedilol to 6.25 mg twice daily.  STOP verapamil (to help bring your heart rate up).  START amlodipine 5 mg daily (to help bring the blood pressure down).  - Follow up:   Lab draw today. I will call you with med changes after I see those.  With Dr. Julien as scheduled next month.     Please, remember to continue the followin.  Weigh yourself daily. Call if your weight is up > than 2 pounds in one day, or 5 pounds in one week; if you feel more short of breath or have worsening swelling in your legs or abdomen.  2.  Eat a low sodium diet (less than 2,000mg or 2g daily). If you eat less salt, you will retain less fluid.   3.  Avoid alcohol, as this can worsen heart failure.   4.  Avoid NSAIDs as able (For example, Ibuprofen / aleve / advil / naprosen / diclofenac).    Please call [facility] nurse for any questions or concerns Mon-Fri 8am-4pm:   [phone]  For concerns after hours:   [phone] option 2   Scheduling phone number:   [phone]    Thank you for visiting with us today.   Ame Mejía PA-C  ______________________________________________________________

## 2022-06-09 ENCOUNTER — CARE COORDINATION (OUTPATIENT)
Dept: CARDIOLOGY | Facility: CLINIC | Age: 82
End: 2022-06-09
Payer: COMMERCIAL

## 2022-06-09 DIAGNOSIS — I48.91 ATRIAL FIBRILLATION, UNSPECIFIED TYPE (H): Primary | ICD-10-CM

## 2022-06-09 NOTE — PROGRESS NOTES
[facility]    Called patient to review lab results. Per Ame Mejía:  H/H actually higher than in the past, creatinine stable, proBNP 2800 (stable from last year). In addition to the prior recommendations made in clinic today, please ask him to reduce his Eliquis to 2.5 mg BID as his creatinine has been > 1.5 for the past year.     Patient verbalized understanding and asked that [facility]. As there were med changes made by Ame Mejía at yesterday's appt(Wed 6/8) I reviewed those:   Coreg 6.25mg bid - from 12.5mg every day ->confirmed this was done and why   Stop verapamil - done   Start amlodipine 5mg/d - patient stated he was directed by his nephrologist today to not start this and instead increase losartan to 100mg/d. Per those clinic notes:  HTN. Pt's home BP is elevated. Some meds were adjusted by Cards yday, but he is not maximized on the ARB, yet. Given that he has proteinuria, we should max that before adding amlod. Of note, he has been taking  losartan 50 mg, not 25 mg as reported in his records.....Increase losartan to 100 mg every day. If this doesn't work, then we will change to another ARB that has better BP control (e.g. irbesartan).  Pt will not  the amlod for now.    Patient confirmed increasing the losartan as above. He had no other questions/concerns. Will forward to SD as PATRICIA.

## 2022-06-10 RX ORDER — LOSARTAN POTASSIUM 100 MG/1
100 TABLET ORAL DAILY
COMMUNITY
Start: 2022-06-10 | End: 2022-06-29

## 2022-06-29 DIAGNOSIS — I50.33 ACUTE ON CHRONIC HEART FAILURE WITH PRESERVED EJECTION FRACTION (HFPEF) (H): ICD-10-CM

## 2022-06-29 RX ORDER — LOSARTAN POTASSIUM 100 MG/1
100 TABLET ORAL DAILY
Qty: 90 TABLET | Refills: 0 | Status: SHIPPED | OUTPATIENT
Start: 2022-06-29 | End: 2022-07-22

## 2022-07-22 ENCOUNTER — OFFICE VISIT (OUTPATIENT)
Dept: CARDIOLOGY | Facility: CLINIC | Age: 82
End: 2022-07-22
Attending: INTERNAL MEDICINE
Payer: COMMERCIAL

## 2022-07-22 VITALS
HEIGHT: 69 IN | WEIGHT: 162.6 LBS | SYSTOLIC BLOOD PRESSURE: 151 MMHG | HEART RATE: 54 BPM | OXYGEN SATURATION: 98 % | DIASTOLIC BLOOD PRESSURE: 77 MMHG | BODY MASS INDEX: 24.08 KG/M2

## 2022-07-22 DIAGNOSIS — I10 ESSENTIAL HYPERTENSION: ICD-10-CM

## 2022-07-22 DIAGNOSIS — I50.32 CHRONIC DIASTOLIC HEART FAILURE (H): ICD-10-CM

## 2022-07-22 DIAGNOSIS — I50.33 ACUTE ON CHRONIC HEART FAILURE WITH PRESERVED EJECTION FRACTION (HFPEF) (H): ICD-10-CM

## 2022-07-22 DIAGNOSIS — I50.32 CHRONIC HEART FAILURE WITH PRESERVED EJECTION FRACTION (HFPEF) (H): ICD-10-CM

## 2022-07-22 PROCEDURE — 99214 OFFICE O/P EST MOD 30 MIN: CPT | Performed by: INTERNAL MEDICINE

## 2022-07-22 RX ORDER — IRBESARTAN 150 MG/1
150 TABLET ORAL AT BEDTIME
Qty: 30 TABLET | Refills: 11 | Status: ON HOLD | OUTPATIENT
Start: 2022-07-22 | End: 2023-01-31

## 2022-07-22 NOTE — PROGRESS NOTES
HPI and Plan:   See dictation    No orders of the defined types were placed in this encounter.      Orders Placed This Encounter   Medications     irbesartan (AVAPRO) 150 MG tablet     Sig: Take 1 tablet (150 mg) by mouth At Bedtime     Dispense:  30 tablet     Refill:  11       Medications Discontinued During This Encounter   Medication Reason     losartan (COZAAR) 100 MG tablet          Encounter Diagnoses   Name Primary?     Chronic heart failure with preserved ejection fraction (HFpEF) (H)      Acute on chronic heart failure with preserved ejection fraction (HFpEF) (H)      Essential hypertension      Chronic diastolic heart failure (H)        CURRENT MEDICATIONS:  Current Outpatient Medications   Medication Sig Dispense Refill     apixaban ANTICOAGULANT (ELIQUIS) 5 MG tablet Take 1/2 tablet(2.5mg) by mouth twice daily 90 tablet 3     carvedilol (COREG) 6.25 MG tablet Take 1 tablet (6.25 mg) by mouth 2 times daily (with meals) 180 tablet 3     furosemide (LASIX) 40 MG tablet Take 1 1/2 tablets(60mg) by mouth every day 135 tablet 0     glucagon 1 MG kit 1 mg as needed for low blood sugar       HUMALOG 100 UNIT/ML injection        irbesartan (AVAPRO) 150 MG tablet Take 1 tablet (150 mg) by mouth At Bedtime 30 tablet 11     LOVASTATIN 20 MG PO TABS 1 TABLET DAILY AT DINNER 90 Tab 0     nystatin (MYCOSTATIN) 892259 UNIT/GM external cream Apply topically 2 times daily as needed        olopatadine (PATANOL) 0.1 % ophthalmic solution 1 drop 2 times daily       HYDROcodone-acetaminophen (NORCO) 5-325 MG tablet Take 1 tablet by mouth every 6 hours as needed for severe pain (Patient not taking: No sig reported)         ALLERGIES     Allergies   Allergen Reactions     No Known Drug Allergies        PAST MEDICAL HISTORY:  Past Medical History:   Diagnosis Date     Anemia      Anemia of chronic disease 5/14/2020     Back pain      CKD (chronic kidney disease) stage 3, GFR 30-59 ml/min (H)      CRF (chronic renal failure),  stage 3  2020     Fall 2019    suffered multiple left rib fractures, C3 and T2 fractures     Mixed hyperlipidemia 2004     Paroxysmal atrial fibrillation (H)      Personal history of colonic polyps 1972    gets colonoscopy every five years, due in      Pleural effusion on left 2019    after multiple rib fractures     Pulmonary hypertension (H) 5/10/2020    Added automatically from request for surgery [id number]     Recurrent Left Pleural effusion -- S/P Pleurex Cath 2019     Rosacea      Type II or unspecified type diabetes mellitus without mention of complication, not stated as uncontrolled      Unspecified essential hypertension        PAST SURGICAL HISTORY:  Past Surgical History:   Procedure Laterality Date     ANESTHESIA CARDIOVERSION N/A 2/3/2020    Procedure: ANESTHESIA, FOR CARDIOVERSION;  Surgeon: GENERIC ANESTHESIA PROVIDER;  Location:  OR      RIGHT HEART CATH MEASUREMENTS RECORDED N/A 2020    Procedure: Right Heart Cath;  Surgeon: Senthil Silva MD;  Location:  HEART CARDIAC CATH LAB     HC REMOVE TONSILS/ADENOIDS,<11 Y/O      T & A <11y.o.     IR CHEST TUBE DRAIN TUNNELED LEFT  2020     IR CHEST TUBE PLACEMENT NON-TUNNELLED LEFT  2020     IR CHEST TUBE REMOVAL NON TUNNELED LEFT  2020     IR CHEST TUBE REMOVAL TUNNELED LEFT  2020     LAPAROSCOPIC CHOLECYSTECTOMY N/A 2020    Procedure: CHOLECYSTECTOMY, LAPAROSCOPIC;  Surgeon: Annette Lambert MD;  Location:  OR     LAPAROSCOPIC HERNIORRHAPHY INGUINAL BILATERAL Bilateral 10/27/2020    Procedure: LAPAROSCOPIC BILATERAL INGUINAL HERNIA REPAIR WITH MESH;  Surgeon: Brian Garsia MD;  Location:  OR       FAMILY HISTORY:  Family History   Problem Relation Age of Onset     Family History Negative Mother          age 71     Family History Negative Father          age 70     Diabetes Brother         alive age 77     Diabetes Brother         alive  "age 76     Family History Negative Brother              Family History Negative Brother              Diabetes Sister         alive age74     Family History Negative Sister          age 86     Heart Disease Sister              Family History Negative Sister              Family History Negative Sister              Diabetes Sister      Diabetes Sister      Diabetes Brother      Diabetes Brother        SOCIAL HISTORY:  Social History     Socioeconomic History     Marital status:      Spouse name: Lianna     Number of children: 4     Years of education: 16   Occupational History     Occupation: [employer]     Employer: [employer]    Tobacco Use     Smoking status: Former Smoker     Packs/day: 0.20     Years: 40.00     Pack years: 8.00     Types: Cigarettes     Start date:      Quit date: 2008     Years since quittin.5     Smokeless tobacco: Never Used   Substance and Sexual Activity     Alcohol use: Not Currently     Alcohol/week: 35.0 standard drinks     Drug use: Not Currently       Review of Systems:  Skin:  Negative       Eyes:  Positive for glasses    ENT:  Negative      Respiratory:  Positive for dyspnea on exertion     Cardiovascular:  Negative;chest pain;palpitations;edema;dizziness;lightheadedness fatigue;Positive for    Gastroenterology: Negative      Genitourinary:  Negative      Musculoskeletal:  Negative      Neurologic:  Negative headaches    Psychiatric:  Negative      Heme/Lymph/Imm:  Negative allergies    Endocrine:  Positive for diabetes      Physical Exam:  Vitals: BP (!) 151/77   Pulse 54   Ht 1.753 m (5' 9\")   Wt 73.8 kg (162 lb 9.6 oz)   SpO2 98%   BMI 24.01 kg/m      Constitutional:  cooperative;in no acute distress        Skin:  warm and dry to the touch bruises present        Head:  normocephalic        Eyes:  pupils equal and round        Lymph:      ENT:  no pallor or cyanosis        Neck:  no carotid bruit    "     Respiratory:  clear to auscultation;normal symmetry    Diminished on posterior left side otherwise clear to auscultation    Cardiac:   irregularly irregular rhythm                pulses below the femoral arteries are diminished                                      GI:  abdomen soft obese      Extremities and Muscular Skeletal:  no edema;no deformities, clubbing, cyanosis, erythema observed              Neurological:  no gross motor deficits;affect appropriate        Psych:  Alert and Oriented x 3          CC  Cony Josefina Julien, DO  [address]  [place] [id number]

## 2022-07-22 NOTE — LETTER
7/22/2022    Jessika Ratliff Adalid  [address]  [place] [address]    RE: Tc Garcia       Dear Colleague,     I had the pleasure of seeing Tc Garcia in the [facility].  HPI and Plan:   See dictation    No orders of the defined types were placed in this encounter.      Orders Placed This Encounter   Medications     irbesartan (AVAPRO) 150 MG tablet     Sig: Take 1 tablet (150 mg) by mouth At Bedtime     Dispense:  30 tablet     Refill:  11       Medications Discontinued During This Encounter   Medication Reason     losartan (COZAAR) 100 MG tablet          Encounter Diagnoses   Name Primary?     Chronic heart failure with preserved ejection fraction (HFpEF) (H)      Acute on chronic heart failure with preserved ejection fraction (HFpEF) (H)      Essential hypertension      Chronic diastolic heart failure (H)        CURRENT MEDICATIONS:  Current Outpatient Medications   Medication Sig Dispense Refill     apixaban ANTICOAGULANT (ELIQUIS) 5 MG tablet Take 1/2 tablet(2.5mg) by mouth twice daily 90 tablet 3     carvedilol (COREG) 6.25 MG tablet Take 1 tablet (6.25 mg) by mouth 2 times daily (with meals) 180 tablet 3     furosemide (LASIX) 40 MG tablet Take 1 1/2 tablets(60mg) by mouth every day 135 tablet 0     glucagon 1 MG kit 1 mg as needed for low blood sugar       HUMALOG 100 UNIT/ML injection        irbesartan (AVAPRO) 150 MG tablet Take 1 tablet (150 mg) by mouth At Bedtime 30 tablet 11     LOVASTATIN 20 MG PO TABS 1 TABLET DAILY AT DINNER 90 Tab 0     nystatin (MYCOSTATIN) 994074 UNIT/GM external cream Apply topically 2 times daily as needed        olopatadine (PATANOL) 0.1 % ophthalmic solution 1 drop 2 times daily       HYDROcodone-acetaminophen (NORCO) 5-325 MG tablet Take 1 tablet by mouth every 6 hours as needed for severe pain (Patient not taking: No sig reported)         ALLERGIES     Allergies   Allergen Reactions     No Known Drug Allergies        PAST MEDICAL  HISTORY:  Past Medical History:   Diagnosis Date     Anemia      Anemia of chronic disease 5/14/2020     Back pain      CKD (chronic kidney disease) stage 3, GFR 30-59 ml/min (H)      CRF (chronic renal failure), stage 3  5/14/2020     Fall 11/2019    suffered multiple left rib fractures, C3 and T2 fractures     Mixed hyperlipidemia 7/7/2004     Paroxysmal atrial fibrillation (H)      Personal history of colonic polyps 1972    gets colonoscopy every five years, due in 2006     Pleural effusion on left 11/2019    after multiple rib fractures     Pulmonary hypertension (H) 5/10/2020    Added automatically from request for surgery [id number]     Recurrent Left Pleural effusion -- S/P Pleurex Cath 5/12/20 12/30/2019     Rosacea 1989     Type II or unspecified type diabetes mellitus without mention of complication, not stated as uncontrolled 1999     Unspecified essential hypertension 1994       PAST SURGICAL HISTORY:  Past Surgical History:   Procedure Laterality Date     ANESTHESIA CARDIOVERSION N/A 2/3/2020    Procedure: ANESTHESIA, FOR CARDIOVERSION;  Surgeon: GENERIC ANESTHESIA PROVIDER;  Location:  OR      RIGHT HEART CATH MEASUREMENTS RECORDED N/A 5/13/2020    Procedure: Right Heart Cath;  Surgeon: Senthil Silva MD;  Location:  HEART CARDIAC CATH LAB     HC REMOVE TONSILS/ADENOIDS,<11 Y/O      T & A <11y.o.     IR CHEST TUBE DRAIN TUNNELED LEFT  5/12/2020     IR CHEST TUBE PLACEMENT NON-TUNNELLED LEFT  7/11/2020     IR CHEST TUBE REMOVAL NON TUNNELED LEFT  7/17/2020     IR CHEST TUBE REMOVAL TUNNELED LEFT  7/11/2020     LAPAROSCOPIC CHOLECYSTECTOMY N/A 11/22/2020    Procedure: CHOLECYSTECTOMY, LAPAROSCOPIC;  Surgeon: Annette Lambert MD;  Location:  OR     LAPAROSCOPIC HERNIORRHAPHY INGUINAL BILATERAL Bilateral 10/27/2020    Procedure: LAPAROSCOPIC BILATERAL INGUINAL HERNIA REPAIR WITH MESH;  Surgeon: Brian Garsia MD;  Location:  OR       FAMILY HISTORY:  Family History   Problem  "Relation Age of Onset     Family History Negative Mother          age 71     Family History Negative Father          age 70     Diabetes Brother         alive age 77     Diabetes Brother         alive age 76     Family History Negative Brother              Family History Negative Brother              Diabetes Sister         alive age74     Family History Negative Sister          age 86     Heart Disease Sister              Family History Negative Sister              Family History Negative Sister              Diabetes Sister      Diabetes Sister      Diabetes Brother      Diabetes Brother        SOCIAL HISTORY:  Social History     Socioeconomic History     Marital status:      Spouse name: Lianna     Number of children: 4     Years of education: 16   Occupational History     Occupation: [employer]     Employer: [employer]    Tobacco Use     Smoking status: Former Smoker     Packs/day: 0.20     Years: 40.00     Pack years: 8.00     Types: Cigarettes     Start date:      Quit date: 2008     Years since quittin.5     Smokeless tobacco: Never Used   Substance and Sexual Activity     Alcohol use: Not Currently     Alcohol/week: 35.0 standard drinks     Drug use: Not Currently       Review of Systems:  Skin:  Negative       Eyes:  Positive for glasses    ENT:  Negative      Respiratory:  Positive for dyspnea on exertion     Cardiovascular:  Negative;chest pain;palpitations;edema;dizziness;lightheadedness fatigue;Positive for    Gastroenterology: Negative      Genitourinary:  Negative      Musculoskeletal:  Negative      Neurologic:  Negative headaches    Psychiatric:  Negative      Heme/Lymph/Imm:  Negative allergies    Endocrine:  Positive for diabetes      Physical Exam:  Vitals: BP (!) 151/77   Pulse 54   Ht 1.753 m (5' 9\")   Wt 73.8 kg (162 lb 9.6 oz)   SpO2 98%   BMI 24.01 kg/m      Constitutional:  cooperative;in no " acute distress        Skin:  warm and dry to the touch bruises present        Head:  normocephalic        Eyes:  pupils equal and round        Lymph:      ENT:  no pallor or cyanosis        Neck:  no carotid bruit        Respiratory:  clear to auscultation;normal symmetry    Diminished on posterior left side otherwise clear to auscultation    Cardiac:   irregularly irregular rhythm                pulses below the femoral arteries are diminished                                      GI:  abdomen soft obese      Extremities and Muscular Skeletal:  no edema;no deformities, clubbing, cyanosis, erythema observed              Neurological:  no gross motor deficits;affect appropriate        Psych:  Alert and Oriented x 3          CC  Cony Josefina Julien, DO  [address]  [place] [address]                    Service Date: 07/22/2022    HISTORY OF PRESENT ILLNESS:  Mr. Garcia is a very pleasant 82-year-old gentleman with a history of chronic diastolic heart failure, pulmonary hypertension, atrial fibrillation and diabetes and chronic kidney disease.  He is here for a followup visit.      He has had difficulty with blood pressure management recently.  We had made some suggestions for him back in June and then he saw his nephrologist, Dr. Chandler, who had made some further recommendations.  Instead of starting amlodipine, he had an increased dose of his losartan for better blood pressure control.      He returns to clinic today.  His blood pressures are still not well controlled.  On  06/08/2022, it was 156/65.  Today is 151/77.      He is complaining of some mild shortness of breath with exertion.  He takes 60 mg of Lasix daily for fluid retention.  His kidney function last observed on 06/08/2022 was around 1.62, which was down a little bit from his previous.      He is not complaining of any chest pain or lightheadedness.  His pulse rate is still low, even on the reduced dose of carvedilol and he remains in  permanent atrial fibrillation.  I do agree with the reduction in his Eliquis dose given his age and kidney function.    PHYSICAL EXAMINATION:  Cardiovascular tones again are irregular consistent with persistent atrial fibrillation.  I do not hear a murmur, gallop or rub.  He has some crackles in the left base that clear mostly after cough, otherwise clear.  He has no peripheral edema.    IMPRESSION:  In summary, Mr. Phillips is a very pleasant 82-year-old male with a history of diastolic congestive heart failure.  He appears mostly compensated.  I did review his last NT-proBNP that was done in .  It was elevated, but decreased from the previous measurement a year ago.  It was measured at 2832.      I think with better blood pressure control, his breathing may improve.  I am going to follow the recommendation of Dr. Chandler and change his losartan to irbesartan at this time.  I have told him to stop taking losartan and start irbesartan and I have given him a prescription for 150 mg daily.  This can be adjusted if need be, if his blood pressure still is not well controlled.  He does have followup with Nephrology in August.      I agree with the reduction in dose of Eliquis.  We will need to continue to monitor his heart rate.  Given his age and his gait stability, I do not want his heart rate to get to slow and make him prone to falling or lightheadedness.  Right now, he seems to be doing okay.  We will continue to follow him on an annual basis or as needed.    Please feel free to contact me with any questions you have in regards to his care.    cc:   Jessika Cordoba MD  [facility]  [address]  [place] [address]    Gary Chandler MD   [facility]  [address]  [place] [address]     Cony Julien,         D: 2022   T: 2022   MT: NAILA    Name:     CEDRICK PHILLIPS  MRN:      [id number]        Account:      [id number]   :      1940            Service Date: 07/22/2022       Document: [id number]      Thank you for allowing me to participate in the care of your patient.      Sincerely,     Cony Julien DO     [facility]  cc:   Cony Julien DO  [address]  [place] [address]

## 2022-07-22 NOTE — PROGRESS NOTES
Service Date: 07/22/2022    HISTORY OF PRESENT ILLNESS:  Mr. Garcia is a very pleasant 82-year-old gentleman with a history of chronic diastolic heart failure, pulmonary hypertension, atrial fibrillation and diabetes and chronic kidney disease.  He is here for a followup visit.      He has had difficulty with blood pressure management recently.  We had made some suggestions for him back in June and then he saw his nephrologist, Dr. Chandler, who had made some further recommendations.  Instead of starting amlodipine, he had an increased dose of his losartan for better blood pressure control.      He returns to clinic today.  His blood pressures are still not well controlled.  On  06/08/2022, it was 156/65.  Today is 151/77.      He is complaining of some mild shortness of breath with exertion.  He takes 60 mg of Lasix daily for fluid retention.  His kidney function last observed on 06/08/2022 was around 1.62, which was down a little bit from his previous.      He is not complaining of any chest pain or lightheadedness.  His pulse rate is still low, even on the reduced dose of carvedilol and he remains in permanent atrial fibrillation.  I do agree with the reduction in his Eliquis dose given his age and kidney function.    PHYSICAL EXAMINATION:  Cardiovascular tones again are irregular consistent with persistent atrial fibrillation.  I do not hear a murmur, gallop or rub.  He has some crackles in the left base that clear mostly after cough, otherwise clear.  He has no peripheral edema.    IMPRESSION:  In summary, Mr. Garcia is a very pleasant 82-year-old male with a history of diastolic congestive heart failure.  He appears mostly compensated.  I did review his last NT-proBNP that was done in June.  It was elevated, but decreased from the previous measurement a year ago.  It was measured at 2832.      I think with better blood pressure control, his breathing may improve.  I am going to follow the recommendation of Dr. Chandler  and change his losartan to irbesartan at this time.  I have told him to stop taking losartan and start irbesartan and I have given him a prescription for 150 mg daily.  This can be adjusted if need be, if his blood pressure still is not well controlled.  He does have followup with Nephrology in August.      I agree with the reduction in dose of Eliquis.  We will need to continue to monitor his heart rate.  Given his age and his gait stability, I do not want his heart rate to get to slow and make him prone to falling or lightheadedness.  Right now, he seems to be doing okay.  We will continue to follow him on an annual basis or as needed.    Please feel free to contact me with any questions you have in regards to his care.    cc:   Jessika Cordoba MD  [facility]  [address]  [place] [address]    Gary Chandler MD   [facility]  [address]  [place] [address]     Cony Julien DO        D: 2022   T: 2022   MT: NAILA    Name:     CEDRICK PHILLIPS  MRN:      [id number]        Account:      [id number]   :      1940           Service Date: 2022       Document: [id number]

## 2022-08-04 DIAGNOSIS — I50.33 ACUTE ON CHRONIC HEART FAILURE WITH PRESERVED EJECTION FRACTION (HFPEF) (H): ICD-10-CM

## 2022-08-04 RX ORDER — FUROSEMIDE 40 MG
TABLET ORAL
Qty: 135 TABLET | Refills: 3 | Status: ON HOLD | OUTPATIENT
Start: 2022-08-04 | End: 2023-01-31

## 2022-10-04 PROBLEM — R11.2 NON-INTRACTABLE VOMITING WITH NAUSEA: Status: ACTIVE | Noted: 2020-11-20

## 2022-10-16 ENCOUNTER — HEALTH MAINTENANCE LETTER (OUTPATIENT)
Age: 82
End: 2022-10-16

## 2022-10-26 NOTE — PROVIDER NOTIFICATION
Dr. Butler notified of patient not having orders. K+ low from yesterday. Requesting labs for morning and potassium replacement. Will contact other specialities for further orders.     Manuel Porter MD  Interventional Cardiology / Endovascular Specialist  [place] Office : [phone] [place] [address] [place]. [address]  Tel:   [place] Office : [address] [facility] [place] [address]  Tel: [phone]    Pt lying in bed in NAD c/o back pain   	  MEDICATIONS:  amLODIPine   Tablet 10 milliGRAM(s) Oral daily  hydrALAZINE Injectable 10 milliGRAM(s) IV Push every 8 hours    ceFAZolin   IVPB 1000 milliGRAM(s) IV Intermittent every 8 hours      acetaminophen   IVPB .. 1000 milliGRAM(s) IV Intermittent every 6 hours  HYDROmorphone  Injectable 1 milliGRAM(s) IV Push every 4 hours PRN  HYDROmorphone  Injectable 0.5 milliGRAM(s) IV Push every 4 hours PRN      dexAMETHasone  IVPB 10 milliGRAM(s) IV Intermittent every 8 hours    chlorhexidine 4% Liquid 1 Application(s) Topical daily  lactated ringers. 1000 milliLiter(s) IV Continuous <Continuous>      PAST MEDICAL/SURGICAL HISTORY  PAST MEDICAL & SURGICAL HISTORY:  Asthma          SOCIAL HISTORY: Substance Use (street drugs): ( x ) never used  (  ) other:    FAMILY HISTORY:      REVIEW OF SYSTEMS:  CONSTITUTIONAL: No fever, weight loss, or fatigue  EYES: No eye pain, visual disturbances, or discharge  ENMT:  No difficulty hearing, tinnitus, vertigo; No sinus or throat pain  BREASTS: No pain, masses, or nipple discharge  GASTROINTESTINAL: No abdominal or epigastric pain. No nausea, vomiting, or hematemesis; No diarrhea or constipation. No melena or hematochezia.  GENITOURINARY: No dysuria, frequency, hematuria, or incontinence  NEUROLOGICAL: No headaches, memory loss, loss of strength, numbness, or tremors  ENDOCRINE: No heat or cold intolerance; No hair loss  MUSCULOSKELETAL: No joint pain or swelling; No muscle, back, or extremity pain  PSYCHIATRIC: No depression, anxiety, mood swings, or difficulty sleeping  HEME/LYMPH: No easy bruising, or bleeding gums  All others negative    PHYSICAL EXAM:  T(C): 37.8 (10-26-22 @ 16:00), Max: 37.8 (10-26-22 @ 16:00)  HR: 101 (10-26-22 @ 18:00) (47 - 109)  BP: 121/77 (10-26-22 @ 16:00) (121/77 - 155/97)  RR: 18 (10-26-22 @ 18:00) (12 - 26)  SpO2: 100% (10-26-22 @ 18:00) (94% - 100%)  Wt(kg): --  I&O's Summary    25 Oct 2022 07:01  -  26 Oct 2022 07:00  --------------------------------------------------------  IN: 4030.5 mL / OUT: 3090 mL / NET: 940.5 mL    26 Oct 2022 07:01  -  26 Oct 2022 21:19  --------------------------------------------------------  IN: 1308.5 mL / OUT: 690 mL / NET: 618.5 mL          GENERAL: extubated   EYES: conjunctiva and sclera clear  ENMT: No tonsillar erythema, exudates, or enlargement  Cardiovascular: Normal S1 S2, No JVD, No murmurs, No edema  Respiratory: Lungs clear to auscultation	  Gastrointestinal:  Soft, Non-tender, + BS	  Extremities: No edema                            11.0   8.14  )-----------( 204      ( 26 Oct 2022 02:22 )             33.0     10-26    143  |  110<H>  |  17  ----------------------------<  173<H>  4.1   |  25  |  0.72    Ca    8.7      26 Oct 2022 02:22  Phos  3.2     10-26  Mg     2.10     10-26      proBNP:   Lipid Profile:   HgA1c:   TSH:     Consultant(s) Notes Reviewed:  [x ] YES  [ ] NO    Care Discussed with Consultants/Other Providers [ x] YES  [ ] NO    Imaging Personally Reviewed independently:  [x] YES  [ ] NO    All labs, radiologic studies, vitals, orders and medications list reviewed. Patient is seen and examined at bedside. Case discussed with medical team.

## 2022-11-28 ENCOUNTER — TELEPHONE (OUTPATIENT)
Dept: CARDIOLOGY | Facility: CLINIC | Age: 82
End: 2022-11-28

## 2022-11-28 DIAGNOSIS — I50.33 ACUTE ON CHRONIC HEART FAILURE WITH PRESERVED EJECTION FRACTION (HFPEF) (H): ICD-10-CM

## 2022-11-28 DIAGNOSIS — I10 ESSENTIAL HYPERTENSION: Primary | ICD-10-CM

## 2022-11-28 NOTE — TELEPHONE ENCOUNTER
Patient reports that BP has been running high.  200's/.  HR 60-70.  Patient denies CP, does continue to have SOB.  Patient states weights have been stable and denies swelling.  Due to see nephrology in February.  Per last OV note 7/22/22:    I think with better blood pressure control, his breathing may improve.  I am going to follow the recommendation of Dr. Chandler and change his losartan to irbesartan at this time.  I have told him to stop taking losartan and start irbesartan and I have given him a prescription for 150 mg daily.  This can be adjusted if need be, if his blood pressure still is not well controlled.  He does have followup with Nephrology in August.     Routing to provider to advise.  Staci Bruce RN on 11/28/2022 at 11:47 AM

## 2022-12-06 RX ORDER — IRBESARTAN 300 MG/1
300 TABLET ORAL AT BEDTIME
Qty: 90 TABLET | Refills: 2 | Status: ON HOLD | OUTPATIENT
Start: 2022-12-06 | End: 2023-01-26

## 2022-12-06 RX ORDER — CARVEDILOL 6.25 MG/1
6.25 TABLET ORAL 2 TIMES DAILY WITH MEALS
Qty: 180 TABLET | Refills: 2 | Status: ON HOLD | OUTPATIENT
Start: 2022-12-06 | End: 2023-01-26

## 2022-12-06 NOTE — TELEPHONE ENCOUNTER
Cony Julien DO Lidke, Jen M, RN  Caller: Unspecified (1 week ago)  Increase dose of irbesartan to 300mg daily        Spoke with patient and discussed recommendations.  Patient has verbalized understanding.  Staci Bruce, RN on 12/6/2022 at 9:23 AM

## 2023-01-01 ENCOUNTER — APPOINTMENT (OUTPATIENT)
Dept: SPEECH THERAPY | Facility: CLINIC | Age: 83
DRG: 064 | End: 2023-01-01
Payer: COMMERCIAL

## 2023-01-01 ENCOUNTER — APPOINTMENT (OUTPATIENT)
Dept: PHYSICAL THERAPY | Facility: CLINIC | Age: 83
DRG: 064 | End: 2023-01-01
Payer: COMMERCIAL

## 2023-01-01 ENCOUNTER — VIRTUAL VISIT (OUTPATIENT)
Dept: NEUROLOGY | Facility: CLINIC | Age: 83
End: 2023-01-01
Payer: COMMERCIAL

## 2023-01-01 ENCOUNTER — APPOINTMENT (OUTPATIENT)
Dept: OCCUPATIONAL THERAPY | Facility: CLINIC | Age: 83
DRG: 064 | End: 2023-01-01
Payer: COMMERCIAL

## 2023-01-01 ENCOUNTER — TELEPHONE (OUTPATIENT)
Dept: CARDIOLOGY | Facility: CLINIC | Age: 83
End: 2023-01-01
Payer: COMMERCIAL

## 2023-01-01 ENCOUNTER — APPOINTMENT (OUTPATIENT)
Dept: GENERAL RADIOLOGY | Facility: CLINIC | Age: 83
DRG: 064 | End: 2023-01-01
Attending: EMERGENCY MEDICINE
Payer: COMMERCIAL

## 2023-01-01 ENCOUNTER — APPOINTMENT (OUTPATIENT)
Dept: CT IMAGING | Facility: CLINIC | Age: 83
DRG: 064 | End: 2023-01-01
Attending: EMERGENCY MEDICINE
Payer: COMMERCIAL

## 2023-01-01 ENCOUNTER — APPOINTMENT (OUTPATIENT)
Dept: CT IMAGING | Facility: CLINIC | Age: 83
DRG: 064 | End: 2023-01-01
Payer: COMMERCIAL

## 2023-01-01 ENCOUNTER — HOSPITAL ENCOUNTER (INPATIENT)
Facility: CLINIC | Age: 83
LOS: 16 days | Discharge: NURSING, REHAB OR LONG-TERM CARE | DRG: 064 | End: 2023-12-27
Attending: EMERGENCY MEDICINE | Admitting: HOSPITALIST
Payer: COMMERCIAL

## 2023-01-01 ENCOUNTER — APPOINTMENT (OUTPATIENT)
Dept: SPEECH THERAPY | Facility: CLINIC | Age: 83
DRG: 064 | End: 2023-01-01
Attending: HOSPITALIST
Payer: COMMERCIAL

## 2023-01-01 ENCOUNTER — HOSPITAL ENCOUNTER (OUTPATIENT)
Dept: CARDIOLOGY | Facility: CLINIC | Age: 83
Discharge: HOME | End: 2023-10-24
Attending: NURSE PRACTITIONER | Admitting: NURSE PRACTITIONER
Payer: COMMERCIAL

## 2023-01-01 ENCOUNTER — LAB (OUTPATIENT)
Dept: LAB | Facility: CLINIC | Age: 83
End: 2023-01-01
Payer: COMMERCIAL

## 2023-01-01 ENCOUNTER — HOSPITAL ENCOUNTER (INPATIENT)
Facility: CLINIC | Age: 83
LOS: 1 days | Discharge: ANOTHER HOSPITAL | DRG: 057 | End: 2023-12-28
Attending: PHYSICAL MEDICINE & REHABILITATION | Admitting: STUDENT IN AN ORGANIZED HEALTH CARE EDUCATION/TRAINING PROGRAM
Payer: COMMERCIAL

## 2023-01-01 ENCOUNTER — APPOINTMENT (OUTPATIENT)
Dept: OCCUPATIONAL THERAPY | Facility: CLINIC | Age: 83
DRG: 949 | End: 2023-01-01
Attending: PHYSICAL MEDICINE & REHABILITATION
Payer: COMMERCIAL

## 2023-01-01 ENCOUNTER — APPOINTMENT (OUTPATIENT)
Dept: CT IMAGING | Facility: CLINIC | Age: 83
End: 2023-01-01
Attending: PHYSICAL MEDICINE & REHABILITATION
Payer: COMMERCIAL

## 2023-01-01 ENCOUNTER — HOSPITAL ENCOUNTER (OUTPATIENT)
Facility: CLINIC | Age: 83
Setting detail: OBSERVATION
Discharge: NURSING, REHAB OR LONG-TERM CARE | End: 2023-12-29
Attending: EMERGENCY MEDICINE | Admitting: INTERNAL MEDICINE
Payer: COMMERCIAL

## 2023-01-01 ENCOUNTER — APPOINTMENT (OUTPATIENT)
Dept: GENERAL RADIOLOGY | Facility: CLINIC | Age: 83
DRG: 064 | End: 2023-01-01
Attending: HOSPITALIST
Payer: COMMERCIAL

## 2023-01-01 ENCOUNTER — APPOINTMENT (OUTPATIENT)
Dept: SPEECH THERAPY | Facility: CLINIC | Age: 83
DRG: 949 | End: 2023-01-01
Attending: PHYSICAL MEDICINE & REHABILITATION
Payer: COMMERCIAL

## 2023-01-01 ENCOUNTER — OFFICE VISIT (OUTPATIENT)
Dept: CARDIOLOGY | Facility: CLINIC | Age: 83
End: 2023-01-01
Attending: PHYSICIAN ASSISTANT
Payer: COMMERCIAL

## 2023-01-01 ENCOUNTER — DOCUMENTATION ONLY (OUTPATIENT)
Dept: CARDIOLOGY | Facility: CLINIC | Age: 83
End: 2023-01-01

## 2023-01-01 ENCOUNTER — APPOINTMENT (OUTPATIENT)
Dept: CT IMAGING | Facility: CLINIC | Age: 83
DRG: 064 | End: 2023-01-01
Attending: PHYSICIAN ASSISTANT
Payer: COMMERCIAL

## 2023-01-01 ENCOUNTER — APPOINTMENT (OUTPATIENT)
Dept: PHYSICAL THERAPY | Facility: CLINIC | Age: 83
DRG: 949 | End: 2023-01-01
Attending: PHYSICAL MEDICINE & REHABILITATION
Payer: COMMERCIAL

## 2023-01-01 ENCOUNTER — DOCUMENTATION ONLY (OUTPATIENT)
Dept: CARDIOLOGY | Facility: CLINIC | Age: 83
End: 2023-01-01
Payer: COMMERCIAL

## 2023-01-01 ENCOUNTER — HEALTH MAINTENANCE LETTER (OUTPATIENT)
Age: 83
End: 2023-01-01

## 2023-01-01 ENCOUNTER — OFFICE VISIT (OUTPATIENT)
Dept: CARDIOLOGY | Facility: CLINIC | Age: 83
End: 2023-01-01
Payer: COMMERCIAL

## 2023-01-01 ENCOUNTER — APPOINTMENT (OUTPATIENT)
Dept: PHYSICAL THERAPY | Facility: CLINIC | Age: 83
DRG: 057 | End: 2023-01-01
Attending: PHYSICAL MEDICINE & REHABILITATION
Payer: COMMERCIAL

## 2023-01-01 ENCOUNTER — TELEPHONE (OUTPATIENT)
Dept: ANTICOAGULATION | Facility: CLINIC | Age: 83
End: 2023-01-01
Payer: COMMERCIAL

## 2023-01-01 ENCOUNTER — APPOINTMENT (OUTPATIENT)
Dept: CT IMAGING | Facility: CLINIC | Age: 83
End: 2023-01-01
Attending: STUDENT IN AN ORGANIZED HEALTH CARE EDUCATION/TRAINING PROGRAM
Payer: COMMERCIAL

## 2023-01-01 ENCOUNTER — CARE COORDINATION (OUTPATIENT)
Dept: CARDIOLOGY | Facility: CLINIC | Age: 83
End: 2023-01-01

## 2023-01-01 ENCOUNTER — LAB (OUTPATIENT)
Dept: LAB | Facility: CLINIC | Age: 83
End: 2023-01-01
Attending: NURSE PRACTITIONER
Payer: COMMERCIAL

## 2023-01-01 ENCOUNTER — APPOINTMENT (OUTPATIENT)
Dept: OCCUPATIONAL THERAPY | Facility: CLINIC | Age: 83
DRG: 064 | End: 2023-01-01
Attending: INTERNAL MEDICINE
Payer: COMMERCIAL

## 2023-01-01 ENCOUNTER — APPOINTMENT (OUTPATIENT)
Dept: GENERAL RADIOLOGY | Facility: CLINIC | Age: 83
DRG: 064 | End: 2023-01-01
Attending: STUDENT IN AN ORGANIZED HEALTH CARE EDUCATION/TRAINING PROGRAM
Payer: COMMERCIAL

## 2023-01-01 ENCOUNTER — APPOINTMENT (OUTPATIENT)
Dept: MRI IMAGING | Facility: CLINIC | Age: 83
DRG: 064 | End: 2023-01-01
Attending: PHYSICIAN ASSISTANT
Payer: COMMERCIAL

## 2023-01-01 ENCOUNTER — HOSPITAL ENCOUNTER (EMERGENCY)
Facility: CLINIC | Age: 83
Discharge: HOME | End: 2023-10-27
Attending: EMERGENCY MEDICINE | Admitting: EMERGENCY MEDICINE
Payer: COMMERCIAL

## 2023-01-01 ENCOUNTER — APPOINTMENT (OUTPATIENT)
Dept: GENERAL RADIOLOGY | Facility: CLINIC | Age: 83
DRG: 064 | End: 2023-01-01
Attending: PHYSICIAN ASSISTANT
Payer: COMMERCIAL

## 2023-01-01 ENCOUNTER — HOSPITAL ENCOUNTER (INPATIENT)
Facility: CLINIC | Age: 83
LOS: 27 days | Discharge: HOME HEALTH | DRG: 949 | End: 2024-01-25
Attending: PHYSICAL MEDICINE & REHABILITATION | Admitting: STUDENT IN AN ORGANIZED HEALTH CARE EDUCATION/TRAINING PROGRAM
Payer: COMMERCIAL

## 2023-01-01 ENCOUNTER — TELEPHONE (OUTPATIENT)
Dept: CARDIOLOGY | Facility: CLINIC | Age: 83
End: 2023-01-01

## 2023-01-01 ENCOUNTER — APPOINTMENT (OUTPATIENT)
Dept: SPEECH THERAPY | Facility: CLINIC | Age: 83
DRG: 057 | End: 2023-01-01
Attending: PHYSICAL MEDICINE & REHABILITATION
Payer: COMMERCIAL

## 2023-01-01 ENCOUNTER — APPOINTMENT (OUTPATIENT)
Dept: CARDIOLOGY | Facility: CLINIC | Age: 83
DRG: 064 | End: 2023-01-01
Attending: PHYSICIAN ASSISTANT
Payer: COMMERCIAL

## 2023-01-01 ENCOUNTER — HOSPITAL ENCOUNTER (EMERGENCY)
Facility: CLINIC | Age: 83
Discharge: HOME | End: 2023-10-14
Attending: EMERGENCY MEDICINE | Admitting: EMERGENCY MEDICINE
Payer: COMMERCIAL

## 2023-01-01 ENCOUNTER — APPOINTMENT (OUTPATIENT)
Dept: CT IMAGING | Facility: CLINIC | Age: 83
DRG: 064 | End: 2023-01-01
Attending: STUDENT IN AN ORGANIZED HEALTH CARE EDUCATION/TRAINING PROGRAM
Payer: COMMERCIAL

## 2023-01-01 ENCOUNTER — OFFICE VISIT (OUTPATIENT)
Dept: CARDIOLOGY | Facility: CLINIC | Age: 83
End: 2023-01-01
Attending: NURSE PRACTITIONER
Payer: COMMERCIAL

## 2023-01-01 ENCOUNTER — APPOINTMENT (OUTPATIENT)
Dept: GENERAL RADIOLOGY | Facility: CLINIC | Age: 83
DRG: 064 | End: 2023-01-01
Attending: INTERNAL MEDICINE
Payer: COMMERCIAL

## 2023-01-01 ENCOUNTER — PATIENT OUTREACH (OUTPATIENT)
Dept: CARE COORDINATION | Facility: CLINIC | Age: 83
End: 2023-01-01
Payer: COMMERCIAL

## 2023-01-01 ENCOUNTER — HOSPITAL ENCOUNTER (OUTPATIENT)
Facility: CLINIC | Age: 83
End: 2023-01-01
Attending: INTERNAL MEDICINE | Admitting: INTERNAL MEDICINE
Payer: COMMERCIAL

## 2023-01-01 ENCOUNTER — APPOINTMENT (OUTPATIENT)
Dept: PHYSICAL THERAPY | Facility: CLINIC | Age: 83
DRG: 064 | End: 2023-01-01
Attending: INTERNAL MEDICINE
Payer: COMMERCIAL

## 2023-01-01 VITALS
RESPIRATION RATE: 16 BRPM | SYSTOLIC BLOOD PRESSURE: 137 MMHG | HEART RATE: 72 BPM | WEIGHT: 138 LBS | OXYGEN SATURATION: 99 % | DIASTOLIC BLOOD PRESSURE: 81 MMHG | HEIGHT: 69 IN | BODY MASS INDEX: 20.44 KG/M2 | TEMPERATURE: 98 F

## 2023-01-01 VITALS
BODY MASS INDEX: 22.01 KG/M2 | HEIGHT: 69 IN | SYSTOLIC BLOOD PRESSURE: 171 MMHG | WEIGHT: 148.6 LBS | HEART RATE: 77 BPM | DIASTOLIC BLOOD PRESSURE: 84 MMHG

## 2023-01-01 VITALS
HEIGHT: 69 IN | SYSTOLIC BLOOD PRESSURE: 156 MMHG | RESPIRATION RATE: 16 BRPM | WEIGHT: 138.01 LBS | DIASTOLIC BLOOD PRESSURE: 83 MMHG | TEMPERATURE: 97.5 F | OXYGEN SATURATION: 98 % | HEART RATE: 84 BPM | BODY MASS INDEX: 20.44 KG/M2

## 2023-01-01 VITALS
OXYGEN SATURATION: 100 % | HEIGHT: 69 IN | SYSTOLIC BLOOD PRESSURE: 148 MMHG | HEART RATE: 82 BPM | BODY MASS INDEX: 20.44 KG/M2 | DIASTOLIC BLOOD PRESSURE: 82 MMHG | WEIGHT: 138 LBS

## 2023-01-01 VITALS
HEIGHT: 69 IN | RESPIRATION RATE: 17 BRPM | WEIGHT: 134.04 LBS | TEMPERATURE: 97.5 F | HEART RATE: 53 BPM | SYSTOLIC BLOOD PRESSURE: 123 MMHG | OXYGEN SATURATION: 97 % | BODY MASS INDEX: 19.85 KG/M2 | DIASTOLIC BLOOD PRESSURE: 74 MMHG

## 2023-01-01 VITALS
BODY MASS INDEX: 19.85 KG/M2 | DIASTOLIC BLOOD PRESSURE: 65 MMHG | WEIGHT: 134 LBS | HEIGHT: 69 IN | SYSTOLIC BLOOD PRESSURE: 118 MMHG

## 2023-01-01 VITALS
DIASTOLIC BLOOD PRESSURE: 82 MMHG | BODY MASS INDEX: 21.92 KG/M2 | WEIGHT: 148 LBS | HEIGHT: 69 IN | OXYGEN SATURATION: 97 % | SYSTOLIC BLOOD PRESSURE: 152 MMHG | HEART RATE: 56 BPM

## 2023-01-01 VITALS
HEART RATE: 82 BPM | BODY MASS INDEX: 20.73 KG/M2 | DIASTOLIC BLOOD PRESSURE: 71 MMHG | SYSTOLIC BLOOD PRESSURE: 127 MMHG | WEIGHT: 140 LBS | TEMPERATURE: 99.8 F | RESPIRATION RATE: 15 BRPM | OXYGEN SATURATION: 94 % | HEIGHT: 69 IN

## 2023-01-01 VITALS
BODY MASS INDEX: 19.85 KG/M2 | RESPIRATION RATE: 18 BRPM | DIASTOLIC BLOOD PRESSURE: 76 MMHG | HEART RATE: 62 BPM | TEMPERATURE: 98 F | HEIGHT: 69 IN | WEIGHT: 134 LBS | SYSTOLIC BLOOD PRESSURE: 130 MMHG | OXYGEN SATURATION: 98 %

## 2023-01-01 VITALS
DIASTOLIC BLOOD PRESSURE: 92 MMHG | HEIGHT: 69 IN | HEART RATE: 86 BPM | OXYGEN SATURATION: 98 % | BODY MASS INDEX: 20.68 KG/M2 | SYSTOLIC BLOOD PRESSURE: 142 MMHG | WEIGHT: 139.6 LBS

## 2023-01-01 DIAGNOSIS — N18.32 STAGE 3B CHRONIC KIDNEY DISEASE (H): ICD-10-CM

## 2023-01-01 DIAGNOSIS — E11.65 INADEQUATELY CONTROLLED DIABETES MELLITUS (H): Primary | ICD-10-CM

## 2023-01-01 DIAGNOSIS — I50.32 CHRONIC DIASTOLIC HEART FAILURE (H): ICD-10-CM

## 2023-01-01 DIAGNOSIS — R06.02 SOB (SHORTNESS OF BREATH): ICD-10-CM

## 2023-01-01 DIAGNOSIS — I50.32 CHRONIC HEART FAILURE WITH PRESERVED EJECTION FRACTION (HFPEF) (H): ICD-10-CM

## 2023-01-01 DIAGNOSIS — J90 PLEURAL EFFUSION: ICD-10-CM

## 2023-01-01 DIAGNOSIS — N17.9 ACUTE KIDNEY INJURY (H): ICD-10-CM

## 2023-01-01 DIAGNOSIS — I48.20 CHRONIC ATRIAL FIBRILLATION (H): ICD-10-CM

## 2023-01-01 DIAGNOSIS — K59.00 CONSTIPATION, UNSPECIFIED CONSTIPATION TYPE: ICD-10-CM
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
## 2023-01-01 DIAGNOSIS — I50.9 CHF (CONGESTIVE HEART FAILURE) (H): Primary | ICD-10-CM

## 2023-01-01 DIAGNOSIS — I10 ESSENTIAL HYPERTENSION: ICD-10-CM

## 2023-01-01 DIAGNOSIS — R06.02 SHORTNESS OF BREATH: ICD-10-CM

## 2023-01-01 DIAGNOSIS — I95.9 HYPOTENSION, UNSPECIFIED HYPOTENSION TYPE: ICD-10-CM

## 2023-01-01 DIAGNOSIS — I61.5 NONTRAUMATIC INTRAVENTRICULAR INTRACEREBRAL HEMORRHAGE, UNSPECIFIED LATERALITY (H): ICD-10-CM

## 2023-01-01 DIAGNOSIS — G47.00 INSOMNIA, UNSPECIFIED TYPE: ICD-10-CM

## 2023-01-01 DIAGNOSIS — R53.1 WEAKNESS: ICD-10-CM

## 2023-01-01 DIAGNOSIS — I50.32 CHRONIC DIASTOLIC HEART FAILURE (H): Primary | ICD-10-CM

## 2023-01-01 DIAGNOSIS — E11.9 DIABETES MELLITUS, TYPE 2 (H): ICD-10-CM

## 2023-01-01 DIAGNOSIS — E78.5 HYPERLIPIDEMIA LDL GOAL <100: ICD-10-CM

## 2023-01-01 DIAGNOSIS — J96.01 ACUTE RESPIRATORY FAILURE WITH HYPOXIA (H): ICD-10-CM

## 2023-01-01 DIAGNOSIS — G47.00 PERSISTENT INSOMNIA: ICD-10-CM

## 2023-01-01 DIAGNOSIS — E16.2 HYPOGLYCEMIA: ICD-10-CM

## 2023-01-01 DIAGNOSIS — I95.9 HYPOTENSION, UNSPECIFIED HYPOTENSION TYPE: Primary | ICD-10-CM

## 2023-01-01 DIAGNOSIS — I07.1 TRICUSPID VALVE INSUFFICIENCY, UNSPECIFIED ETIOLOGY: ICD-10-CM

## 2023-01-01 DIAGNOSIS — I50.32 CHRONIC DIASTOLIC CONGESTIVE HEART FAILURE (H): ICD-10-CM

## 2023-01-01 DIAGNOSIS — R55 SYNCOPE AND COLLAPSE: ICD-10-CM

## 2023-01-01 DIAGNOSIS — R10.84 ABDOMINAL PAIN, GENERALIZED: ICD-10-CM

## 2023-01-01 DIAGNOSIS — N17.9 AKI (ACUTE KIDNEY INJURY) (H): ICD-10-CM

## 2023-01-01 DIAGNOSIS — Z79.4 TYPE 2 DIABETES MELLITUS WITH OTHER SPECIFIED COMPLICATION, WITH LONG-TERM CURRENT USE OF INSULIN (H): ICD-10-CM

## 2023-01-01 DIAGNOSIS — N30.00 ACUTE CYSTITIS WITHOUT HEMATURIA: ICD-10-CM

## 2023-01-01 DIAGNOSIS — N18.32 STAGE 3B CHRONIC KIDNEY DISEASE (H): Primary | ICD-10-CM

## 2023-01-01 DIAGNOSIS — Z98.890 POSTOPERATIVE STATE: ICD-10-CM

## 2023-01-01 DIAGNOSIS — R06.02 SHORTNESS OF BREATH: Primary | ICD-10-CM

## 2023-01-01 DIAGNOSIS — E55.9 VITAMIN D DEFICIENCY: ICD-10-CM

## 2023-01-01 DIAGNOSIS — I61.9 INTRAPARENCHYMAL HEMORRHAGE OF BRAIN (H): ICD-10-CM

## 2023-01-01 DIAGNOSIS — I50.9 CHF (CONGESTIVE HEART FAILURE) (H): ICD-10-CM

## 2023-01-01 DIAGNOSIS — E86.0 DEHYDRATION: ICD-10-CM

## 2023-01-01 DIAGNOSIS — I27.20 PULMONARY HYPERTENSION (H): ICD-10-CM

## 2023-01-01 DIAGNOSIS — R73.9 HYPERGLYCEMIA: ICD-10-CM

## 2023-01-01 DIAGNOSIS — R09.02 HYPOXIA: ICD-10-CM

## 2023-01-01 DIAGNOSIS — I61.9 INTRAPARENCHYMAL HEMORRHAGE OF BRAIN (H): Primary | ICD-10-CM

## 2023-01-01 DIAGNOSIS — E11.69 TYPE 2 DIABETES MELLITUS WITH OTHER SPECIFIED COMPLICATION, WITH LONG-TERM CURRENT USE OF INSULIN (H): ICD-10-CM

## 2023-01-01 DIAGNOSIS — R11.10 VOMITING, UNSPECIFIED VOMITING TYPE, UNSPECIFIED WHETHER NAUSEA PRESENT: ICD-10-CM

## 2023-01-01 DIAGNOSIS — E78.2 MIXED HYPERLIPIDEMIA: ICD-10-CM

## 2023-01-01 DIAGNOSIS — Z79.4 ENCOUNTER FOR LONG-TERM (CURRENT) USE OF INSULIN (H): ICD-10-CM

## 2023-01-01 DIAGNOSIS — I48.91 ATRIAL FIBRILLATION, UNSPECIFIED TYPE (H): ICD-10-CM

## 2023-01-01 DIAGNOSIS — E11.10 DIABETIC KETOACIDOSIS WITHOUT COMA ASSOCIATED WITH TYPE 2 DIABETES MELLITUS (H): ICD-10-CM

## 2023-01-01 DIAGNOSIS — Z74.09 IMMOBILITY: ICD-10-CM

## 2023-01-01 DIAGNOSIS — I62.9 INTRACRANIAL HEMORRHAGE (H): ICD-10-CM

## 2023-01-01 DIAGNOSIS — I50.33 ACUTE ON CHRONIC HEART FAILURE WITH PRESERVED EJECTION FRACTION (HFPEF) (H): ICD-10-CM

## 2023-01-01 DIAGNOSIS — E11.69 TYPE 2 DIABETES MELLITUS WITH OTHER SPECIFIED COMPLICATION, UNSPECIFIED WHETHER LONG TERM INSULIN USE (H): ICD-10-CM

## 2023-01-01 DIAGNOSIS — Z96.41 INSULIN PUMP STATUS: ICD-10-CM

## 2023-01-01 DIAGNOSIS — I50.9 CONGESTIVE HEART FAILURE, UNSPECIFIED HF CHRONICITY, UNSPECIFIED HEART FAILURE TYPE (H): ICD-10-CM

## 2023-01-01 DIAGNOSIS — M79.609 PAIN IN EXTREMITY, UNSPECIFIED EXTREMITY: ICD-10-CM

## 2023-01-01 DIAGNOSIS — K81.0 ACUTE CHOLECYSTITIS: ICD-10-CM

## 2023-01-01 DIAGNOSIS — Z79.01 LONG TERM (CURRENT) USE OF ANTICOAGULANTS: ICD-10-CM

## 2023-01-01 DIAGNOSIS — I50.32 CHRONIC DIASTOLIC CONGESTIVE HEART FAILURE (H): Primary | ICD-10-CM

## 2023-01-01 DIAGNOSIS — I61.5 RIGHT-SIDED NONTRAUMATIC INTRAVENTRICULAR INTRACEREBRAL HEMORRHAGE (H): Primary | ICD-10-CM

## 2023-01-01 LAB
ALBUMIN SERPL BCG-MCNC: 3.4 G/DL (ref 3.5–5.2)
ALBUMIN SERPL BCG-MCNC: 3.4 G/DL (ref 3.5–5.2)
ALBUMIN SERPL BCG-MCNC: 3.6 G/DL (ref 3.5–5.2)
ALBUMIN SERPL BCG-MCNC: 3.6 G/DL (ref 3.5–5.2)
ALBUMIN SERPL BCG-MCNC: 3.7 G/DL (ref 3.5–5.2)
ALBUMIN SERPL BCG-MCNC: 3.7 G/DL (ref 3.5–5.2)
ALBUMIN UR-MCNC: 10 MG/DL
ALBUMIN UR-MCNC: 10 MG/DL
ALBUMIN UR-MCNC: 70 MG/DL
ALP SERPL-CCNC: 54 U/L (ref 40–129)
ALP SERPL-CCNC: 66 U/L (ref 40–150)
ALP SERPL-CCNC: 69 U/L (ref 40–150)
ALP SERPL-CCNC: 70 U/L (ref 40–150)
ALP SERPL-CCNC: 73 U/L (ref 40–150)
ALP SERPL-CCNC: 74 U/L (ref 40–150)
ALT SERPL W P-5'-P-CCNC: 10 U/L (ref 0–70)
ALT SERPL W P-5'-P-CCNC: 5 U/L (ref 0–70)
ALT SERPL W P-5'-P-CCNC: 5 U/L (ref 0–70)
ALT SERPL W P-5'-P-CCNC: 7 U/L (ref 0–70)
ALT SERPL W P-5'-P-CCNC: <5 U/L (ref 0–70)
ALT SERPL W P-5'-P-CCNC: <5 U/L (ref 0–70)
AMMONIA PLAS-SCNC: 15 UMOL/L (ref 16–60)
ANION GAP SERPL CALCULATED.3IONS-SCNC: 10 MMOL/L (ref 7–15)
ANION GAP SERPL CALCULATED.3IONS-SCNC: 11 MMOL/L (ref 7–15)
ANION GAP SERPL CALCULATED.3IONS-SCNC: 11 MMOL/L (ref 7–15)
ANION GAP SERPL CALCULATED.3IONS-SCNC: 12 MMOL/L (ref 7–15)
ANION GAP SERPL CALCULATED.3IONS-SCNC: 13 MMOL/L (ref 7–15)
ANION GAP SERPL CALCULATED.3IONS-SCNC: 14 MMOL/L (ref 7–15)
ANION GAP SERPL CALCULATED.3IONS-SCNC: 16 MMOL/L (ref 7–15)
ANION GAP SERPL CALCULATED.3IONS-SCNC: 19 MMOL/L (ref 7–15)
ANION GAP SERPL CALCULATED.3IONS-SCNC: 5 MMOL/L (ref 7–15)
ANION GAP SERPL CALCULATED.3IONS-SCNC: 6 MMOL/L (ref 7–15)
ANION GAP SERPL CALCULATED.3IONS-SCNC: 6 MMOL/L (ref 7–15)
ANION GAP SERPL CALCULATED.3IONS-SCNC: 7 MMOL/L (ref 7–15)
ANION GAP SERPL CALCULATED.3IONS-SCNC: 8 MMOL/L (ref 7–15)
ANION GAP SERPL CALCULATED.3IONS-SCNC: 8 MMOL/L (ref 7–15)
ANION GAP SERPL CALCULATED.3IONS-SCNC: 9 MMOL/L (ref 7–15)
APPEARANCE UR: ABNORMAL
APPEARANCE UR: CLEAR
APPEARANCE UR: CLEAR
APTT PPP: 34 SECONDS (ref 22–38)
AST SERPL W P-5'-P-CCNC: 10 U/L (ref 0–45)
AST SERPL W P-5'-P-CCNC: 16 U/L (ref 0–45)
AST SERPL W P-5'-P-CCNC: 19 U/L (ref 0–45)
AST SERPL W P-5'-P-CCNC: 9 U/L (ref 0–45)
ATRIAL RATE - MUSE: 344 BPM
ATRIAL RATE - MUSE: 73 BPM
ATRIAL RATE - MUSE: NORMAL BPM
B-OH-BUTYR SERPL-SCNC: 0.39 MMOL/L
B-OH-BUTYR SERPL-SCNC: 1.2 MMOL/L
B-OH-BUTYR SERPL-SCNC: 2.8 MMOL/L
B-OH-BUTYR SERPL-SCNC: 3.3 MMOL/L
B-OH-BUTYR SERPL-SCNC: 4.2 MMOL/L
B-OH-BUTYR SERPL-SCNC: 5.6 MMOL/L
B-OH-BUTYR SERPL-SCNC: <0.18 MMOL/L
B-OH-BUTYR SERPL-SCNC: <0.18 MMOL/L
BACTERIA #/AREA URNS HPF: ABNORMAL /HPF
BACTERIA BLD CULT: NO GROWTH
BACTERIA UR CULT: ABNORMAL
BACTERIA UR CULT: NO GROWTH
BACTERIA UR CULT: NO GROWTH
BASE EXCESS BLDV CALC-SCNC: -1.6 MMOL/L (ref -7.7–1.9)
BASOPHILS # BLD AUTO: 0 10E3/UL (ref 0–0.2)
BASOPHILS # BLD AUTO: 0.1 10E3/UL (ref 0–0.2)
BASOPHILS NFR BLD AUTO: 0 %
BASOPHILS NFR BLD AUTO: 1 %
BILIRUB DIRECT SERPL-MCNC: <0.2 MG/DL (ref 0–0.3)
BILIRUB SERPL-MCNC: 0.3 MG/DL
BILIRUB SERPL-MCNC: 0.4 MG/DL
BILIRUB SERPL-MCNC: 0.4 MG/DL
BILIRUB SERPL-MCNC: 0.5 MG/DL
BILIRUB SERPL-MCNC: 0.6 MG/DL
BILIRUB SERPL-MCNC: 0.6 MG/DL
BILIRUB UR QL STRIP: NEGATIVE
BUN SERPL-MCNC: 10.7 MG/DL (ref 8–23)
BUN SERPL-MCNC: 12.1 MG/DL (ref 8–23)
BUN SERPL-MCNC: 12.9 MG/DL (ref 8–23)
BUN SERPL-MCNC: 13.7 MG/DL (ref 8–23)
BUN SERPL-MCNC: 14.7 MG/DL (ref 8–23)
BUN SERPL-MCNC: 14.8 MG/DL (ref 8–23)
BUN SERPL-MCNC: 16.3 MG/DL (ref 8–23)
BUN SERPL-MCNC: 19 MG/DL (ref 8–23)
BUN SERPL-MCNC: 19.7 MG/DL (ref 8–23)
BUN SERPL-MCNC: 19.7 MG/DL (ref 8–23)
BUN SERPL-MCNC: 19.9 MG/DL (ref 8–23)
BUN SERPL-MCNC: 20.1 MG/DL (ref 8–23)
BUN SERPL-MCNC: 20.4 MG/DL (ref 8–23)
BUN SERPL-MCNC: 21.4 MG/DL (ref 8–23)
BUN SERPL-MCNC: 21.4 MG/DL (ref 8–23)
BUN SERPL-MCNC: 21.5 MG/DL (ref 8–23)
BUN SERPL-MCNC: 21.6 MG/DL (ref 8–23)
BUN SERPL-MCNC: 22.4 MG/DL (ref 8–23)
BUN SERPL-MCNC: 22.8 MG/DL (ref 8–23)
BUN SERPL-MCNC: 24.2 MG/DL (ref 8–23)
BUN SERPL-MCNC: 24.5 MG/DL (ref 8–23)
BUN SERPL-MCNC: 25.9 MG/DL (ref 8–23)
BUN SERPL-MCNC: 27.3 MG/DL (ref 8–23)
BUN SERPL-MCNC: 27.7 MG/DL (ref 8–23)
BUN SERPL-MCNC: 28.4 MG/DL (ref 8–23)
BUN SERPL-MCNC: 28.7 MG/DL (ref 8–23)
BUN SERPL-MCNC: 29 MG/DL (ref 8–23)
BUN SERPL-MCNC: 30.5 MG/DL (ref 8–23)
BUN SERPL-MCNC: 31.6 MG/DL (ref 8–23)
BUN SERPL-MCNC: 33.2 MG/DL (ref 8–23)
BUN SERPL-MCNC: 35.5 MG/DL (ref 8–23)
BUN SERPL-MCNC: 35.7 MG/DL (ref 8–23)
BUN SERPL-MCNC: 36.6 MG/DL (ref 8–23)
BUN SERPL-MCNC: 37 MG/DL (ref 8–23)
BUN SERPL-MCNC: 59.3 MG/DL (ref 8–23)
CALCIUM SERPL-MCNC: 8.3 MG/DL (ref 8.8–10.2)
CALCIUM SERPL-MCNC: 8.3 MG/DL (ref 8.8–10.2)
CALCIUM SERPL-MCNC: 8.5 MG/DL (ref 8.8–10.2)
CALCIUM SERPL-MCNC: 8.5 MG/DL (ref 8.8–10.2)
CALCIUM SERPL-MCNC: 8.6 MG/DL (ref 8.8–10.2)
CALCIUM SERPL-MCNC: 8.7 MG/DL (ref 8.8–10.2)
CALCIUM SERPL-MCNC: 8.9 MG/DL (ref 8.8–10.2)
CALCIUM SERPL-MCNC: 9 MG/DL (ref 8.8–10.2)
CALCIUM SERPL-MCNC: 9.1 MG/DL (ref 8.8–10.2)
CALCIUM SERPL-MCNC: 9.2 MG/DL (ref 8.8–10.2)
CALCIUM SERPL-MCNC: 9.3 MG/DL (ref 8.8–10.2)
CALCIUM SERPL-MCNC: 9.4 MG/DL (ref 8.8–10.2)
CALCIUM SERPL-MCNC: 9.5 MG/DL (ref 8.8–10.2)
CALCIUM SERPL-MCNC: 9.6 MG/DL (ref 8.8–10.2)
CALCIUM SERPL-MCNC: 9.8 MG/DL (ref 8.8–10.2)
CHLORIDE SERPL-SCNC: 100 MMOL/L (ref 98–107)
CHLORIDE SERPL-SCNC: 101 MMOL/L (ref 98–107)
CHLORIDE SERPL-SCNC: 102 MMOL/L (ref 98–107)
CHLORIDE SERPL-SCNC: 103 MMOL/L (ref 98–107)
CHLORIDE SERPL-SCNC: 104 MMOL/L (ref 98–107)
CHLORIDE SERPL-SCNC: 106 MMOL/L (ref 98–107)
CHLORIDE SERPL-SCNC: 106 MMOL/L (ref 98–107)
CHLORIDE SERPL-SCNC: 94 MMOL/L (ref 98–107)
CHLORIDE SERPL-SCNC: 94 MMOL/L (ref 98–107)
CHLORIDE SERPL-SCNC: 95 MMOL/L (ref 98–107)
CHLORIDE SERPL-SCNC: 95 MMOL/L (ref 98–107)
CHLORIDE SERPL-SCNC: 96 MMOL/L (ref 98–107)
CHLORIDE SERPL-SCNC: 97 MMOL/L (ref 98–107)
CHLORIDE SERPL-SCNC: 98 MMOL/L (ref 98–107)
CHLORIDE SERPL-SCNC: 99 MMOL/L (ref 98–107)
CHOLEST SERPL-MCNC: 137 MG/DL
COLOR UR AUTO: ABNORMAL
CREAT SERPL-MCNC: 1.29 MG/DL (ref 0.67–1.17)
CREAT SERPL-MCNC: 1.37 MG/DL (ref 0.67–1.17)
CREAT SERPL-MCNC: 1.4 MG/DL (ref 0.67–1.17)
CREAT SERPL-MCNC: 1.41 MG/DL (ref 0.67–1.17)
CREAT SERPL-MCNC: 1.43 MG/DL (ref 0.67–1.17)
CREAT SERPL-MCNC: 1.43 MG/DL (ref 0.67–1.17)
CREAT SERPL-MCNC: 1.45 MG/DL (ref 0.67–1.17)
CREAT SERPL-MCNC: 1.46 MG/DL (ref 0.67–1.17)
CREAT SERPL-MCNC: 1.46 MG/DL (ref 0.67–1.17)
CREAT SERPL-MCNC: 1.47 MG/DL (ref 0.67–1.17)
CREAT SERPL-MCNC: 1.48 MG/DL (ref 0.67–1.17)
CREAT SERPL-MCNC: 1.48 MG/DL (ref 0.67–1.17)
CREAT SERPL-MCNC: 1.52 MG/DL (ref 0.67–1.17)
CREAT SERPL-MCNC: 1.52 MG/DL (ref 0.67–1.17)
CREAT SERPL-MCNC: 1.54 MG/DL (ref 0.67–1.17)
CREAT SERPL-MCNC: 1.55 MG/DL (ref 0.67–1.17)
CREAT SERPL-MCNC: 1.56 MG/DL (ref 0.67–1.17)
CREAT SERPL-MCNC: 1.56 MG/DL (ref 0.67–1.17)
CREAT SERPL-MCNC: 1.57 MG/DL (ref 0.67–1.17)
CREAT SERPL-MCNC: 1.6 MG/DL (ref 0.67–1.17)
CREAT SERPL-MCNC: 1.6 MG/DL (ref 0.67–1.17)
CREAT SERPL-MCNC: 1.62 MG/DL (ref 0.67–1.17)
CREAT SERPL-MCNC: 1.63 MG/DL (ref 0.67–1.17)
CREAT SERPL-MCNC: 1.64 MG/DL (ref 0.67–1.17)
CREAT SERPL-MCNC: 1.66 MG/DL (ref 0.67–1.17)
CREAT SERPL-MCNC: 1.66 MG/DL (ref 0.67–1.17)
CREAT SERPL-MCNC: 1.67 MG/DL (ref 0.67–1.17)
CREAT SERPL-MCNC: 1.69 MG/DL (ref 0.67–1.17)
CREAT SERPL-MCNC: 1.71 MG/DL (ref 0.67–1.17)
CREAT SERPL-MCNC: 1.71 MG/DL (ref 0.67–1.17)
CREAT SERPL-MCNC: 1.74 MG/DL (ref 0.67–1.17)
CREAT SERPL-MCNC: 1.74 MG/DL (ref 0.67–1.17)
CREAT SERPL-MCNC: 1.75 MG/DL (ref 0.67–1.17)
CREAT SERPL-MCNC: 1.75 MG/DL (ref 0.67–1.17)
CREAT SERPL-MCNC: 1.78 MG/DL (ref 0.67–1.17)
CREAT SERPL-MCNC: 2.43 MG/DL (ref 0.67–1.17)
DEPRECATED HCO3 PLAS-SCNC: 20 MMOL/L (ref 22–29)
DEPRECATED HCO3 PLAS-SCNC: 22 MMOL/L (ref 22–29)
DEPRECATED HCO3 PLAS-SCNC: 23 MMOL/L (ref 22–29)
DEPRECATED HCO3 PLAS-SCNC: 24 MMOL/L (ref 22–29)
DEPRECATED HCO3 PLAS-SCNC: 24 MMOL/L (ref 22–29)
DEPRECATED HCO3 PLAS-SCNC: 25 MMOL/L (ref 22–29)
DEPRECATED HCO3 PLAS-SCNC: 26 MMOL/L (ref 22–29)
DEPRECATED HCO3 PLAS-SCNC: 27 MMOL/L (ref 22–29)
DEPRECATED HCO3 PLAS-SCNC: 28 MMOL/L (ref 22–29)
DEPRECATED HCO3 PLAS-SCNC: 29 MMOL/L (ref 22–29)
DEPRECATED HCO3 PLAS-SCNC: 30 MMOL/L (ref 22–29)
DEPRECATED HCO3 PLAS-SCNC: 31 MMOL/L (ref 22–29)
DEPRECATED HCO3 PLAS-SCNC: 32 MMOL/L (ref 22–29)
DEPRECATED HCO3 PLAS-SCNC: 33 MMOL/L (ref 22–29)
DEPRECATED HCO3 PLAS-SCNC: 33 MMOL/L (ref 22–29)
DIASTOLIC BLOOD PRESSURE - MUSE: NORMAL MMHG
EGFRCR SERPLBLD CKD-EPI 2021: 26 ML/MIN/1.73M2
EGFRCR SERPLBLD CKD-EPI 2021: 37 ML/MIN/1.73M2
EGFRCR SERPLBLD CKD-EPI 2021: 38 ML/MIN/1.73M2
EGFRCR SERPLBLD CKD-EPI 2021: 39 ML/MIN/1.73M2
EGFRCR SERPLBLD CKD-EPI 2021: 39 ML/MIN/1.73M2
EGFRCR SERPLBLD CKD-EPI 2021: 40 ML/MIN/1.73M2
EGFRCR SERPLBLD CKD-EPI 2021: 41 ML/MIN/1.73M2
EGFRCR SERPLBLD CKD-EPI 2021: 41 ML/MIN/1.73M2
EGFRCR SERPLBLD CKD-EPI 2021: 42 ML/MIN/1.73M2
EGFRCR SERPLBLD CKD-EPI 2021: 43 ML/MIN/1.73M2
EGFRCR SERPLBLD CKD-EPI 2021: 44 ML/MIN/1.73M2
EGFRCR SERPLBLD CKD-EPI 2021: 45 ML/MIN/1.73M2
EGFRCR SERPLBLD CKD-EPI 2021: 45 ML/MIN/1.73M2
EGFRCR SERPLBLD CKD-EPI 2021: 46 ML/MIN/1.73M2
EGFRCR SERPLBLD CKD-EPI 2021: 47 ML/MIN/1.73M2
EGFRCR SERPLBLD CKD-EPI 2021: 48 ML/MIN/1.73M2
EGFRCR SERPLBLD CKD-EPI 2021: 49 ML/MIN/1.73M2
EGFRCR SERPLBLD CKD-EPI 2021: 51 ML/MIN/1.73M2
EGFRCR SERPLBLD CKD-EPI 2021: 55 ML/MIN/1.73M2
EOSINOPHIL # BLD AUTO: 0 10E3/UL (ref 0–0.7)
EOSINOPHIL # BLD AUTO: 0.3 10E3/UL (ref 0–0.7)
EOSINOPHIL # BLD AUTO: 0.4 10E3/UL (ref 0–0.7)
EOSINOPHIL # BLD AUTO: 0.5 10E3/UL (ref 0–0.7)
EOSINOPHIL # BLD AUTO: 0.5 10E3/UL (ref 0–0.7)
EOSINOPHIL # BLD AUTO: 0.6 10E3/UL (ref 0–0.7)
EOSINOPHIL # BLD AUTO: 0.6 10E3/UL (ref 0–0.7)
EOSINOPHIL NFR BLD AUTO: 0 %
EOSINOPHIL NFR BLD AUTO: 2 %
EOSINOPHIL NFR BLD AUTO: 2 %
EOSINOPHIL NFR BLD AUTO: 3 %
EOSINOPHIL NFR BLD AUTO: 4 %
EOSINOPHIL NFR BLD AUTO: 5 %
ERYTHROCYTE [DISTWIDTH] IN BLOOD BY AUTOMATED COUNT: 14.7 % (ref 10–15)
ERYTHROCYTE [DISTWIDTH] IN BLOOD BY AUTOMATED COUNT: 16.5 % (ref 10–15)
ERYTHROCYTE [DISTWIDTH] IN BLOOD BY AUTOMATED COUNT: 16.6 % (ref 10–15)
ERYTHROCYTE [DISTWIDTH] IN BLOOD BY AUTOMATED COUNT: 16.7 % (ref 10–15)
ERYTHROCYTE [DISTWIDTH] IN BLOOD BY AUTOMATED COUNT: 16.7 % (ref 10–15)
ERYTHROCYTE [DISTWIDTH] IN BLOOD BY AUTOMATED COUNT: 16.8 % (ref 10–15)
ERYTHROCYTE [DISTWIDTH] IN BLOOD BY AUTOMATED COUNT: 16.8 % (ref 10–15)
ERYTHROCYTE [DISTWIDTH] IN BLOOD BY AUTOMATED COUNT: 16.9 % (ref 10–15)
ERYTHROCYTE [DISTWIDTH] IN BLOOD BY AUTOMATED COUNT: 16.9 % (ref 10–15)
ERYTHROCYTE [DISTWIDTH] IN BLOOD BY AUTOMATED COUNT: 17.1 % (ref 10–15)
ERYTHROCYTE [DISTWIDTH] IN BLOOD BY AUTOMATED COUNT: 17.2 % (ref 10–15)
ERYTHROCYTE [DISTWIDTH] IN BLOOD BY AUTOMATED COUNT: 17.3 % (ref 10–15)
ERYTHROCYTE [DISTWIDTH] IN BLOOD BY AUTOMATED COUNT: 17.3 % (ref 10–15)
ERYTHROCYTE [DISTWIDTH] IN BLOOD BY AUTOMATED COUNT: 17.5 % (ref 10–15)
ERYTHROCYTE [DISTWIDTH] IN BLOOD BY AUTOMATED COUNT: 17.6 % (ref 10–15)
ERYTHROCYTE [DISTWIDTH] IN BLOOD BY AUTOMATED COUNT: 17.7 % (ref 10–15)
ERYTHROCYTE [DISTWIDTH] IN BLOOD BY AUTOMATED COUNT: 17.8 % (ref 10–15)
ERYTHROCYTE [DISTWIDTH] IN BLOOD BY AUTOMATED COUNT: 17.9 % (ref 10–15)
GFR SERPL CREATININE-BSD FRML MDRD: 46 ML/MIN/1.73M2
GFR SERPL CREATININE-BSD FRML MDRD: 50 ML/MIN/1.73M2
GLUCOSE BLDC GLUCOMTR-MCNC: 101 MG/DL (ref 70–99)
GLUCOSE BLDC GLUCOMTR-MCNC: 101 MG/DL (ref 70–99)
GLUCOSE BLDC GLUCOMTR-MCNC: 102 MG/DL (ref 70–99)
GLUCOSE BLDC GLUCOMTR-MCNC: 103 MG/DL (ref 70–99)
GLUCOSE BLDC GLUCOMTR-MCNC: 106 MG/DL (ref 70–99)
GLUCOSE BLDC GLUCOMTR-MCNC: 106 MG/DL (ref 70–99)
GLUCOSE BLDC GLUCOMTR-MCNC: 108 MG/DL (ref 70–99)
GLUCOSE BLDC GLUCOMTR-MCNC: 109 MG/DL (ref 70–99)
GLUCOSE BLDC GLUCOMTR-MCNC: 110 MG/DL (ref 70–99)
GLUCOSE BLDC GLUCOMTR-MCNC: 113 MG/DL (ref 70–99)
GLUCOSE BLDC GLUCOMTR-MCNC: 113 MG/DL (ref 70–99)
GLUCOSE BLDC GLUCOMTR-MCNC: 114 MG/DL (ref 70–99)
GLUCOSE BLDC GLUCOMTR-MCNC: 115 MG/DL (ref 70–99)
GLUCOSE BLDC GLUCOMTR-MCNC: 117 MG/DL (ref 70–99)
GLUCOSE BLDC GLUCOMTR-MCNC: 119 MG/DL (ref 70–99)
GLUCOSE BLDC GLUCOMTR-MCNC: 121 MG/DL (ref 70–99)
GLUCOSE BLDC GLUCOMTR-MCNC: 124 MG/DL (ref 70–99)
GLUCOSE BLDC GLUCOMTR-MCNC: 124 MG/DL (ref 70–99)
GLUCOSE BLDC GLUCOMTR-MCNC: 125 MG/DL (ref 70–99)
GLUCOSE BLDC GLUCOMTR-MCNC: 126 MG/DL (ref 70–99)
GLUCOSE BLDC GLUCOMTR-MCNC: 128 MG/DL (ref 70–99)
GLUCOSE BLDC GLUCOMTR-MCNC: 133 MG/DL (ref 70–99)
GLUCOSE BLDC GLUCOMTR-MCNC: 136 MG/DL (ref 70–99)
GLUCOSE BLDC GLUCOMTR-MCNC: 138 MG/DL (ref 70–99)
GLUCOSE BLDC GLUCOMTR-MCNC: 138 MG/DL (ref 70–99)
GLUCOSE BLDC GLUCOMTR-MCNC: 139 MG/DL (ref 70–99)
GLUCOSE BLDC GLUCOMTR-MCNC: 139 MG/DL (ref 70–99)
GLUCOSE BLDC GLUCOMTR-MCNC: 142 MG/DL (ref 70–99)
GLUCOSE BLDC GLUCOMTR-MCNC: 142 MG/DL (ref 70–99)
GLUCOSE BLDC GLUCOMTR-MCNC: 143 MG/DL (ref 70–99)
GLUCOSE BLDC GLUCOMTR-MCNC: 147 MG/DL (ref 70–99)
GLUCOSE BLDC GLUCOMTR-MCNC: 148 MG/DL (ref 70–99)
GLUCOSE BLDC GLUCOMTR-MCNC: 148 MG/DL (ref 70–99)
GLUCOSE BLDC GLUCOMTR-MCNC: 149 MG/DL (ref 70–99)
GLUCOSE BLDC GLUCOMTR-MCNC: 150 MG/DL (ref 70–99)
GLUCOSE BLDC GLUCOMTR-MCNC: 150 MG/DL (ref 70–99)
GLUCOSE BLDC GLUCOMTR-MCNC: 151 MG/DL (ref 70–99)
GLUCOSE BLDC GLUCOMTR-MCNC: 153 MG/DL (ref 70–99)
GLUCOSE BLDC GLUCOMTR-MCNC: 154 MG/DL (ref 70–99)
GLUCOSE BLDC GLUCOMTR-MCNC: 154 MG/DL (ref 70–99)
GLUCOSE BLDC GLUCOMTR-MCNC: 162 MG/DL (ref 70–99)
GLUCOSE BLDC GLUCOMTR-MCNC: 163 MG/DL (ref 70–99)
GLUCOSE BLDC GLUCOMTR-MCNC: 166 MG/DL (ref 70–99)
GLUCOSE BLDC GLUCOMTR-MCNC: 166 MG/DL (ref 70–99)
GLUCOSE BLDC GLUCOMTR-MCNC: 177 MG/DL (ref 70–99)
GLUCOSE BLDC GLUCOMTR-MCNC: 177 MG/DL (ref 70–99)
GLUCOSE BLDC GLUCOMTR-MCNC: 178 MG/DL (ref 70–99)
GLUCOSE BLDC GLUCOMTR-MCNC: 182 MG/DL (ref 70–99)
GLUCOSE BLDC GLUCOMTR-MCNC: 185 MG/DL (ref 70–99)
GLUCOSE BLDC GLUCOMTR-MCNC: 186 MG/DL (ref 70–99)
GLUCOSE BLDC GLUCOMTR-MCNC: 186 MG/DL (ref 70–99)
GLUCOSE BLDC GLUCOMTR-MCNC: 195 MG/DL (ref 70–99)
GLUCOSE BLDC GLUCOMTR-MCNC: 195 MG/DL (ref 70–99)
GLUCOSE BLDC GLUCOMTR-MCNC: 196 MG/DL (ref 70–99)
GLUCOSE BLDC GLUCOMTR-MCNC: 200 MG/DL (ref 70–99)
GLUCOSE BLDC GLUCOMTR-MCNC: 205 MG/DL (ref 70–99)
GLUCOSE BLDC GLUCOMTR-MCNC: 205 MG/DL (ref 70–99)
GLUCOSE BLDC GLUCOMTR-MCNC: 209 MG/DL (ref 70–99)
GLUCOSE BLDC GLUCOMTR-MCNC: 210 MG/DL (ref 70–99)
GLUCOSE BLDC GLUCOMTR-MCNC: 212 MG/DL (ref 70–99)
GLUCOSE BLDC GLUCOMTR-MCNC: 221 MG/DL (ref 70–99)
GLUCOSE BLDC GLUCOMTR-MCNC: 222 MG/DL (ref 70–99)
GLUCOSE BLDC GLUCOMTR-MCNC: 223 MG/DL (ref 70–99)
GLUCOSE BLDC GLUCOMTR-MCNC: 229 MG/DL (ref 70–99)
GLUCOSE BLDC GLUCOMTR-MCNC: 229 MG/DL (ref 70–99)
GLUCOSE BLDC GLUCOMTR-MCNC: 230 MG/DL (ref 70–99)
GLUCOSE BLDC GLUCOMTR-MCNC: 232 MG/DL (ref 70–99)
GLUCOSE BLDC GLUCOMTR-MCNC: 233 MG/DL (ref 70–99)
GLUCOSE BLDC GLUCOMTR-MCNC: 235 MG/DL (ref 70–99)
GLUCOSE BLDC GLUCOMTR-MCNC: 235 MG/DL (ref 70–99)
GLUCOSE BLDC GLUCOMTR-MCNC: 243 MG/DL (ref 70–99)
GLUCOSE BLDC GLUCOMTR-MCNC: 252 MG/DL (ref 70–99)
GLUCOSE BLDC GLUCOMTR-MCNC: 254 MG/DL (ref 70–99)
GLUCOSE BLDC GLUCOMTR-MCNC: 257 MG/DL (ref 70–99)
GLUCOSE BLDC GLUCOMTR-MCNC: 260 MG/DL (ref 70–99)
GLUCOSE BLDC GLUCOMTR-MCNC: 265 MG/DL (ref 70–99)
GLUCOSE BLDC GLUCOMTR-MCNC: 266 MG/DL (ref 70–99)
GLUCOSE BLDC GLUCOMTR-MCNC: 268 MG/DL (ref 70–99)
GLUCOSE BLDC GLUCOMTR-MCNC: 278 MG/DL (ref 70–99)
GLUCOSE BLDC GLUCOMTR-MCNC: 281 MG/DL (ref 70–99)
GLUCOSE BLDC GLUCOMTR-MCNC: 282 MG/DL (ref 70–99)
GLUCOSE BLDC GLUCOMTR-MCNC: 283 MG/DL (ref 70–99)
GLUCOSE BLDC GLUCOMTR-MCNC: 283 MG/DL (ref 70–99)
GLUCOSE BLDC GLUCOMTR-MCNC: 290 MG/DL (ref 70–99)
GLUCOSE BLDC GLUCOMTR-MCNC: 292 MG/DL (ref 70–99)
GLUCOSE BLDC GLUCOMTR-MCNC: 296 MG/DL (ref 70–99)
GLUCOSE BLDC GLUCOMTR-MCNC: 301 MG/DL (ref 70–99)
GLUCOSE BLDC GLUCOMTR-MCNC: 315 MG/DL (ref 70–99)
GLUCOSE BLDC GLUCOMTR-MCNC: 318 MG/DL (ref 70–99)
GLUCOSE BLDC GLUCOMTR-MCNC: 318 MG/DL (ref 70–99)
GLUCOSE BLDC GLUCOMTR-MCNC: 319 MG/DL (ref 70–99)
GLUCOSE BLDC GLUCOMTR-MCNC: 319 MG/DL (ref 70–99)
GLUCOSE BLDC GLUCOMTR-MCNC: 320 MG/DL (ref 70–99)
GLUCOSE BLDC GLUCOMTR-MCNC: 322 MG/DL (ref 70–99)
GLUCOSE BLDC GLUCOMTR-MCNC: 322 MG/DL (ref 70–99)
GLUCOSE BLDC GLUCOMTR-MCNC: 323 MG/DL (ref 70–99)
GLUCOSE BLDC GLUCOMTR-MCNC: 323 MG/DL (ref 70–99)
GLUCOSE BLDC GLUCOMTR-MCNC: 325 MG/DL (ref 70–99)
GLUCOSE BLDC GLUCOMTR-MCNC: 326 MG/DL (ref 70–99)
GLUCOSE BLDC GLUCOMTR-MCNC: 330 MG/DL (ref 70–99)
GLUCOSE BLDC GLUCOMTR-MCNC: 335 MG/DL (ref 70–99)
GLUCOSE BLDC GLUCOMTR-MCNC: 34 MG/DL (ref 70–99)
GLUCOSE BLDC GLUCOMTR-MCNC: 341 MG/DL (ref 70–99)
GLUCOSE BLDC GLUCOMTR-MCNC: 345 MG/DL (ref 70–99)
GLUCOSE BLDC GLUCOMTR-MCNC: 360 MG/DL (ref 70–99)
GLUCOSE BLDC GLUCOMTR-MCNC: 370 MG/DL (ref 70–99)
GLUCOSE BLDC GLUCOMTR-MCNC: 371 MG/DL (ref 70–99)
GLUCOSE BLDC GLUCOMTR-MCNC: 374 MG/DL (ref 70–99)
GLUCOSE BLDC GLUCOMTR-MCNC: 375 MG/DL (ref 70–99)
GLUCOSE BLDC GLUCOMTR-MCNC: 378 MG/DL (ref 70–99)
GLUCOSE BLDC GLUCOMTR-MCNC: 392 MG/DL (ref 70–99)
GLUCOSE BLDC GLUCOMTR-MCNC: 394 MG/DL (ref 70–99)
GLUCOSE BLDC GLUCOMTR-MCNC: 400 MG/DL (ref 70–99)
GLUCOSE BLDC GLUCOMTR-MCNC: 402 MG/DL (ref 70–99)
GLUCOSE BLDC GLUCOMTR-MCNC: 408 MG/DL (ref 70–99)
GLUCOSE BLDC GLUCOMTR-MCNC: 425 MG/DL (ref 70–99)
GLUCOSE BLDC GLUCOMTR-MCNC: 429 MG/DL (ref 70–99)
GLUCOSE BLDC GLUCOMTR-MCNC: 43 MG/DL (ref 70–99)
GLUCOSE BLDC GLUCOMTR-MCNC: 435 MG/DL (ref 70–99)
GLUCOSE BLDC GLUCOMTR-MCNC: 437 MG/DL (ref 70–99)
GLUCOSE BLDC GLUCOMTR-MCNC: 47 MG/DL (ref 70–99)
GLUCOSE BLDC GLUCOMTR-MCNC: 472 MG/DL (ref 70–99)
GLUCOSE BLDC GLUCOMTR-MCNC: 487 MG/DL (ref 70–99)
GLUCOSE BLDC GLUCOMTR-MCNC: 504 MG/DL (ref 70–99)
GLUCOSE BLDC GLUCOMTR-MCNC: 504 MG/DL (ref 70–99)
GLUCOSE BLDC GLUCOMTR-MCNC: 509 MG/DL (ref 70–99)
GLUCOSE BLDC GLUCOMTR-MCNC: 51 MG/DL (ref 70–99)
GLUCOSE BLDC GLUCOMTR-MCNC: 526 MG/DL (ref 70–99)
GLUCOSE BLDC GLUCOMTR-MCNC: 53 MG/DL (ref 70–99)
GLUCOSE BLDC GLUCOMTR-MCNC: 535 MG/DL (ref 70–99)
GLUCOSE BLDC GLUCOMTR-MCNC: 557 MG/DL (ref 70–99)
GLUCOSE BLDC GLUCOMTR-MCNC: 56 MG/DL (ref 70–99)
GLUCOSE BLDC GLUCOMTR-MCNC: 564 MG/DL (ref 70–99)
GLUCOSE BLDC GLUCOMTR-MCNC: 568 MG/DL (ref 70–99)
GLUCOSE BLDC GLUCOMTR-MCNC: 57 MG/DL (ref 70–99)
GLUCOSE BLDC GLUCOMTR-MCNC: 59 MG/DL (ref 70–99)
GLUCOSE BLDC GLUCOMTR-MCNC: 60 MG/DL (ref 70–99)
GLUCOSE BLDC GLUCOMTR-MCNC: 61 MG/DL (ref 70–99)
GLUCOSE BLDC GLUCOMTR-MCNC: 63 MG/DL (ref 70–99)
GLUCOSE BLDC GLUCOMTR-MCNC: 65 MG/DL (ref 70–99)
GLUCOSE BLDC GLUCOMTR-MCNC: 66 MG/DL (ref 70–99)
GLUCOSE BLDC GLUCOMTR-MCNC: 67 MG/DL (ref 70–99)
GLUCOSE BLDC GLUCOMTR-MCNC: 67 MG/DL (ref 70–99)
GLUCOSE BLDC GLUCOMTR-MCNC: 70 MG/DL (ref 70–99)
GLUCOSE BLDC GLUCOMTR-MCNC: 71 MG/DL (ref 70–99)
GLUCOSE BLDC GLUCOMTR-MCNC: 72 MG/DL (ref 70–99)
GLUCOSE BLDC GLUCOMTR-MCNC: 72 MG/DL (ref 70–99)
GLUCOSE BLDC GLUCOMTR-MCNC: 73 MG/DL (ref 70–99)
GLUCOSE BLDC GLUCOMTR-MCNC: 74 MG/DL (ref 70–99)
GLUCOSE BLDC GLUCOMTR-MCNC: 75 MG/DL (ref 70–99)
GLUCOSE BLDC GLUCOMTR-MCNC: 76 MG/DL (ref 70–99)
GLUCOSE BLDC GLUCOMTR-MCNC: 76 MG/DL (ref 70–99)
GLUCOSE BLDC GLUCOMTR-MCNC: 77 MG/DL (ref 70–99)
GLUCOSE BLDC GLUCOMTR-MCNC: 77 MG/DL (ref 70–99)
GLUCOSE BLDC GLUCOMTR-MCNC: 80 MG/DL (ref 70–99)
GLUCOSE BLDC GLUCOMTR-MCNC: 81 MG/DL (ref 70–99)
GLUCOSE BLDC GLUCOMTR-MCNC: 82 MG/DL (ref 70–99)
GLUCOSE BLDC GLUCOMTR-MCNC: 83 MG/DL (ref 70–99)
GLUCOSE BLDC GLUCOMTR-MCNC: 84 MG/DL (ref 70–99)
GLUCOSE BLDC GLUCOMTR-MCNC: 84 MG/DL (ref 70–99)
GLUCOSE BLDC GLUCOMTR-MCNC: 85 MG/DL (ref 70–99)
GLUCOSE BLDC GLUCOMTR-MCNC: 87 MG/DL (ref 70–99)
GLUCOSE BLDC GLUCOMTR-MCNC: 88 MG/DL (ref 70–99)
GLUCOSE BLDC GLUCOMTR-MCNC: 88 MG/DL (ref 70–99)
GLUCOSE BLDC GLUCOMTR-MCNC: 92 MG/DL (ref 70–99)
GLUCOSE BLDC GLUCOMTR-MCNC: 94 MG/DL (ref 70–99)
GLUCOSE BLDC GLUCOMTR-MCNC: 94 MG/DL (ref 70–99)
GLUCOSE BLDC GLUCOMTR-MCNC: 95 MG/DL (ref 70–99)
GLUCOSE BLDC GLUCOMTR-MCNC: 97 MG/DL (ref 70–99)
GLUCOSE BLDC GLUCOMTR-MCNC: 98 MG/DL (ref 70–99)
GLUCOSE BLDC GLUCOMTR-MCNC: 99 MG/DL (ref 70–99)
GLUCOSE BLDC GLUCOMTR-MCNC: 99 MG/DL (ref 70–99)
GLUCOSE SERPL-MCNC: 116 MG/DL (ref 70–99)
GLUCOSE SERPL-MCNC: 127 MG/DL (ref 70–99)
GLUCOSE SERPL-MCNC: 138 MG/DL (ref 70–99)
GLUCOSE SERPL-MCNC: 156 MG/DL (ref 70–99)
GLUCOSE SERPL-MCNC: 173 MG/DL (ref 70–99)
GLUCOSE SERPL-MCNC: 174 MG/DL (ref 70–99)
GLUCOSE SERPL-MCNC: 202 MG/DL (ref 70–99)
GLUCOSE SERPL-MCNC: 202 MG/DL (ref 70–99)
GLUCOSE SERPL-MCNC: 205 MG/DL (ref 70–99)
GLUCOSE SERPL-MCNC: 205 MG/DL (ref 70–99)
GLUCOSE SERPL-MCNC: 208 MG/DL (ref 70–99)
GLUCOSE SERPL-MCNC: 215 MG/DL (ref 70–99)
GLUCOSE SERPL-MCNC: 217 MG/DL (ref 70–99)
GLUCOSE SERPL-MCNC: 233 MG/DL (ref 70–99)
GLUCOSE SERPL-MCNC: 241 MG/DL (ref 70–99)
GLUCOSE SERPL-MCNC: 253 MG/DL (ref 70–99)
GLUCOSE SERPL-MCNC: 258 MG/DL (ref 70–99)
GLUCOSE SERPL-MCNC: 261 MG/DL (ref 70–99)
GLUCOSE SERPL-MCNC: 263 MG/DL (ref 70–99)
GLUCOSE SERPL-MCNC: 263 MG/DL (ref 70–99)
GLUCOSE SERPL-MCNC: 296 MG/DL (ref 70–99)
GLUCOSE SERPL-MCNC: 316 MG/DL (ref 70–99)
GLUCOSE SERPL-MCNC: 347 MG/DL (ref 70–99)
GLUCOSE SERPL-MCNC: 347 MG/DL (ref 70–99)
GLUCOSE SERPL-MCNC: 353 MG/DL (ref 70–99)
GLUCOSE SERPL-MCNC: 362 MG/DL (ref 70–99)
GLUCOSE SERPL-MCNC: 375 MG/DL (ref 70–99)
GLUCOSE SERPL-MCNC: 403 MG/DL (ref 70–99)
GLUCOSE SERPL-MCNC: 404 MG/DL (ref 70–99)
GLUCOSE SERPL-MCNC: 425 MG/DL (ref 70–99)
GLUCOSE SERPL-MCNC: 454 MG/DL (ref 70–99)
GLUCOSE SERPL-MCNC: 465 MG/DL (ref 70–99)
GLUCOSE SERPL-MCNC: 472 MG/DL (ref 70–99)
GLUCOSE SERPL-MCNC: 543 MG/DL (ref 70–99)
GLUCOSE SERPL-MCNC: 81 MG/DL (ref 70–99)
GLUCOSE UR STRIP-MCNC: >=1000 MG/DL
HBA1C MFR BLD: 8.1 %
HCO3 BLDV-SCNC: 24 MMOL/L (ref 21–28)
HCO3 BLDV-SCNC: 29 MMOL/L (ref 21–28)
HCT VFR BLD AUTO: 29.6 % (ref 40–53)
HCT VFR BLD AUTO: 31.2 % (ref 40–53)
HCT VFR BLD AUTO: 31.4 % (ref 40–53)
HCT VFR BLD AUTO: 32 % (ref 40–53)
HCT VFR BLD AUTO: 32 % (ref 40–53)
HCT VFR BLD AUTO: 32.3 % (ref 40–53)
HCT VFR BLD AUTO: 32.6 % (ref 40–53)
HCT VFR BLD AUTO: 32.8 % (ref 40–53)
HCT VFR BLD AUTO: 33.1 % (ref 40–53)
HCT VFR BLD AUTO: 33.2 % (ref 40–53)
HCT VFR BLD AUTO: 33.3 % (ref 40–53)
HCT VFR BLD AUTO: 33.3 % (ref 40–53)
HCT VFR BLD AUTO: 33.8 % (ref 40–53)
HCT VFR BLD AUTO: 34.2 % (ref 40–53)
HCT VFR BLD AUTO: 34.3 % (ref 40–53)
HCT VFR BLD AUTO: 35.4 % (ref 40–53)
HCT VFR BLD AUTO: 35.6 % (ref 40–53)
HCT VFR BLD AUTO: 35.8 % (ref 40–53)
HDLC SERPL-MCNC: 68 MG/DL
HGB BLD-MCNC: 10 G/DL (ref 13.3–17.7)
HGB BLD-MCNC: 10.1 G/DL (ref 13.3–17.7)
HGB BLD-MCNC: 10.1 G/DL (ref 13.3–17.7)
HGB BLD-MCNC: 10.2 G/DL (ref 13.3–17.7)
HGB BLD-MCNC: 10.4 G/DL (ref 13.3–17.7)
HGB BLD-MCNC: 10.5 G/DL (ref 13.3–17.7)
HGB BLD-MCNC: 10.5 G/DL (ref 13.3–17.7)
HGB BLD-MCNC: 10.6 G/DL (ref 13.3–17.7)
HGB BLD-MCNC: 10.6 G/DL (ref 13.3–17.7)
HGB BLD-MCNC: 10.7 G/DL (ref 13.3–17.7)
HGB BLD-MCNC: 10.8 G/DL (ref 13.3–17.7)
HGB BLD-MCNC: 11 G/DL (ref 13.3–17.7)
HGB BLD-MCNC: 11.2 G/DL (ref 13.3–17.7)
HGB BLD-MCNC: 11.4 G/DL (ref 13.3–17.7)
HGB BLD-MCNC: 11.4 G/DL (ref 13.3–17.7)
HGB BLD-MCNC: 14.3 G/DL (ref 13.3–17.7)
HGB BLD-MCNC: 9.5 G/DL (ref 13.3–17.7)
HGB BLD-MCNC: 9.7 G/DL (ref 13.3–17.7)
HGB BLD-MCNC: 9.9 G/DL (ref 13.3–17.7)
HGB UR QL STRIP: ABNORMAL
HGB UR QL STRIP: ABNORMAL
HGB UR QL STRIP: NEGATIVE
HOLD SPECIMEN: NORMAL
IMM GRANULOCYTES # BLD: 0 10E3/UL
IMM GRANULOCYTES # BLD: 0.1 10E3/UL
IMM GRANULOCYTES # BLD: 0.2 10E3/UL
IMM GRANULOCYTES NFR BLD: 0 %
IMM GRANULOCYTES NFR BLD: 1 %
INR PPP: 1.35 (ref 0.85–1.15)
INTERPRETATION ECG - MUSE: NORMAL
KETONES UR STRIP-MCNC: 10 MG/DL
KETONES UR STRIP-MCNC: 20 MG/DL
KETONES UR STRIP-MCNC: 20 MG/DL
LACTATE BLD-SCNC: 0.8 MMOL/L
LACTATE SERPL-SCNC: 0.8 MMOL/L (ref 0.7–2)
LACTATE SERPL-SCNC: 1.3 MMOL/L (ref 0.7–2)
LACTATE SERPL-SCNC: 1.8 MMOL/L (ref 0.7–2)
LACTATE SERPL-SCNC: 2.2 MMOL/L (ref 0.7–2)
LACTATE SERPL-SCNC: 2.3 MMOL/L (ref 0.7–2)
LACTATE SERPL-SCNC: 2.3 MMOL/L (ref 0.7–2)
LACTATE SERPL-SCNC: 5.6 MMOL/L (ref 0.7–2)
LDLC SERPL CALC-MCNC: 54 MG/DL
LEUKOCYTE ESTERASE UR QL STRIP: ABNORMAL
LIPASE SERPL-CCNC: 7 U/L (ref 13–60)
LVEF ECHO: NORMAL
LVEF ECHO: NORMAL
LYMPHOCYTES # BLD AUTO: 0.4 10E3/UL (ref 0.8–5.3)
LYMPHOCYTES # BLD AUTO: 0.7 10E3/UL (ref 0.8–5.3)
LYMPHOCYTES # BLD AUTO: 0.7 10E3/UL (ref 0.8–5.3)
LYMPHOCYTES # BLD AUTO: 0.8 10E3/UL (ref 0.8–5.3)
LYMPHOCYTES # BLD AUTO: 0.9 10E3/UL (ref 0.8–5.3)
LYMPHOCYTES # BLD AUTO: 1.1 10E3/UL (ref 0.8–5.3)
LYMPHOCYTES # BLD AUTO: 1.1 10E3/UL (ref 0.8–5.3)
LYMPHOCYTES # BLD AUTO: 1.2 10E3/UL (ref 0.8–5.3)
LYMPHOCYTES NFR BLD AUTO: 3 %
LYMPHOCYTES NFR BLD AUTO: 6 %
LYMPHOCYTES NFR BLD AUTO: 7 %
LYMPHOCYTES NFR BLD AUTO: 8 %
LYMPHOCYTES NFR BLD AUTO: 8 %
MAGNESIUM SERPL-MCNC: 1.9 MG/DL (ref 1.7–2.3)
MAGNESIUM SERPL-MCNC: 2.1 MG/DL (ref 1.7–2.3)
MCH RBC QN AUTO: 26.5 PG (ref 26.5–33)
MCH RBC QN AUTO: 26.5 PG (ref 26.5–33)
MCH RBC QN AUTO: 26.6 PG (ref 26.5–33)
MCH RBC QN AUTO: 26.7 PG (ref 26.5–33)
MCH RBC QN AUTO: 26.8 PG (ref 26.5–33)
MCH RBC QN AUTO: 26.8 PG (ref 26.5–33)
MCH RBC QN AUTO: 26.9 PG (ref 26.5–33)
MCH RBC QN AUTO: 26.9 PG (ref 26.5–33)
MCH RBC QN AUTO: 27 PG (ref 26.5–33)
MCH RBC QN AUTO: 27.1 PG (ref 26.5–33)
MCH RBC QN AUTO: 27.2 PG (ref 26.5–33)
MCH RBC QN AUTO: 27.3 PG (ref 26.5–33)
MCH RBC QN AUTO: 27.4 PG (ref 26.5–33)
MCH RBC QN AUTO: 27.6 PG (ref 26.5–33)
MCH RBC QN AUTO: 27.8 PG (ref 26.5–33)
MCHC RBC AUTO-ENTMCNC: 30.9 G/DL (ref 31.5–36.5)
MCHC RBC AUTO-ENTMCNC: 31 G/DL (ref 31.5–36.5)
MCHC RBC AUTO-ENTMCNC: 31.1 G/DL (ref 31.5–36.5)
MCHC RBC AUTO-ENTMCNC: 31.3 G/DL (ref 31.5–36.5)
MCHC RBC AUTO-ENTMCNC: 31.3 G/DL (ref 31.5–36.5)
MCHC RBC AUTO-ENTMCNC: 31.5 G/DL (ref 31.5–36.5)
MCHC RBC AUTO-ENTMCNC: 31.6 G/DL (ref 31.5–36.5)
MCHC RBC AUTO-ENTMCNC: 31.6 G/DL (ref 31.5–36.5)
MCHC RBC AUTO-ENTMCNC: 31.7 G/DL (ref 31.5–36.5)
MCHC RBC AUTO-ENTMCNC: 31.7 G/DL (ref 31.5–36.5)
MCHC RBC AUTO-ENTMCNC: 31.8 G/DL (ref 31.5–36.5)
MCHC RBC AUTO-ENTMCNC: 32 G/DL (ref 31.5–36.5)
MCHC RBC AUTO-ENTMCNC: 32 G/DL (ref 31.5–36.5)
MCHC RBC AUTO-ENTMCNC: 32.1 G/DL (ref 31.5–36.5)
MCHC RBC AUTO-ENTMCNC: 32.1 G/DL (ref 31.5–36.5)
MCHC RBC AUTO-ENTMCNC: 32.2 G/DL (ref 31.5–36.5)
MCV RBC AUTO: 83 FL (ref 78–100)
MCV RBC AUTO: 84 FL (ref 78–100)
MCV RBC AUTO: 85 FL (ref 78–100)
MCV RBC AUTO: 86 FL (ref 78–100)
MCV RBC AUTO: 87 FL (ref 78–100)
MCV RBC AUTO: 87 FL (ref 78–100)
MONOCYTES # BLD AUTO: 0.6 10E3/UL (ref 0–1.3)
MONOCYTES # BLD AUTO: 0.9 10E3/UL (ref 0–1.3)
MONOCYTES # BLD AUTO: 0.9 10E3/UL (ref 0–1.3)
MONOCYTES # BLD AUTO: 1 10E3/UL (ref 0–1.3)
MONOCYTES # BLD AUTO: 1 10E3/UL (ref 0–1.3)
MONOCYTES # BLD AUTO: 1.1 10E3/UL (ref 0–1.3)
MONOCYTES # BLD AUTO: 1.2 10E3/UL (ref 0–1.3)
MONOCYTES # BLD AUTO: 1.3 10E3/UL (ref 0–1.3)
MONOCYTES # BLD AUTO: 1.3 10E3/UL (ref 0–1.3)
MONOCYTES # BLD AUTO: 1.8 10E3/UL (ref 0–1.3)
MONOCYTES NFR BLD AUTO: 10 %
MONOCYTES NFR BLD AUTO: 10 %
MONOCYTES NFR BLD AUTO: 11 %
MONOCYTES NFR BLD AUTO: 14 %
MONOCYTES NFR BLD AUTO: 5 %
MONOCYTES NFR BLD AUTO: 5 %
MONOCYTES NFR BLD AUTO: 6 %
MONOCYTES NFR BLD AUTO: 7 %
MONOCYTES NFR BLD AUTO: 8 %
MONOCYTES NFR BLD AUTO: 9 %
MRSA DNA SPEC QL NAA+PROBE: NEGATIVE
NEUTROPHILS # BLD AUTO: 10.1 10E3/UL (ref 1.6–8.3)
NEUTROPHILS # BLD AUTO: 10.6 10E3/UL (ref 1.6–8.3)
NEUTROPHILS # BLD AUTO: 10.7 10E3/UL (ref 1.6–8.3)
NEUTROPHILS # BLD AUTO: 12.8 10E3/UL (ref 1.6–8.3)
NEUTROPHILS # BLD AUTO: 13.3 10E3/UL (ref 1.6–8.3)
NEUTROPHILS # BLD AUTO: 14.6 10E3/UL (ref 1.6–8.3)
NEUTROPHILS # BLD AUTO: 8.3 10E3/UL (ref 1.6–8.3)
NEUTROPHILS # BLD AUTO: 8.6 10E3/UL (ref 1.6–8.3)
NEUTROPHILS # BLD AUTO: 9.3 10E3/UL (ref 1.6–8.3)
NEUTROPHILS # BLD AUTO: 9.4 10E3/UL (ref 1.6–8.3)
NEUTROPHILS NFR BLD AUTO: 75 %
NEUTROPHILS NFR BLD AUTO: 77 %
NEUTROPHILS NFR BLD AUTO: 78 %
NEUTROPHILS NFR BLD AUTO: 79 %
NEUTROPHILS NFR BLD AUTO: 80 %
NEUTROPHILS NFR BLD AUTO: 81 %
NEUTROPHILS NFR BLD AUTO: 82 %
NEUTROPHILS NFR BLD AUTO: 82 %
NEUTROPHILS NFR BLD AUTO: 84 %
NEUTROPHILS NFR BLD AUTO: 84 %
NITRATE UR QL: NEGATIVE
NONHDLC SERPL-MCNC: 69 MG/DL
NRBC # BLD AUTO: 0 10E3/UL
NRBC BLD AUTO-RTO: 0 /100
NT-PROBNP SERPL-MCNC: 2963 PG/ML (ref 0–1800)
NT-PROBNP SERPL-MCNC: 4488 PG/ML (ref 0–1800)
O2/TOTAL GAS SETTING VFR VENT: 95 %
OSMOLALITY SERPL: 311 MMOL/KG (ref 280–301)
OSMOLALITY SERPL: 318 MMOL/KG (ref 280–301)
P AXIS - MUSE: NORMAL DEGREES
PCO2 BLDV: 44 MM HG (ref 40–50)
PCO2 BLDV: 53 MM HG (ref 40–50)
PH BLDV: 7.35 [PH] (ref 7.32–7.43)
PH BLDV: 7.35 [PH] (ref 7.32–7.43)
PH UR STRIP: 5 [PH] (ref 5–7)
PH UR STRIP: 5.5 [PH] (ref 5–7)
PH UR STRIP: 5.5 [PH] (ref 5–7)
PHOSPHATE SERPL-MCNC: 2.4 MG/DL (ref 2.5–4.5)
PHOSPHATE SERPL-MCNC: 2.8 MG/DL (ref 2.5–4.5)
PHOSPHATE SERPL-MCNC: 2.8 MG/DL (ref 2.5–4.5)
PHOSPHATE SERPL-MCNC: 3.5 MG/DL (ref 2.5–4.5)
PHOSPHATE SERPL-MCNC: 3.5 MG/DL (ref 2.5–4.5)
PLATELET # BLD AUTO: 202 10E3/UL (ref 150–450)
PLATELET # BLD AUTO: 219 10E3/UL (ref 150–450)
PLATELET # BLD AUTO: 234 10E3/UL (ref 150–450)
PLATELET # BLD AUTO: 242 10E3/UL (ref 150–450)
PLATELET # BLD AUTO: 251 10E3/UL (ref 150–450)
PLATELET # BLD AUTO: 251 10E3/UL (ref 150–450)
PLATELET # BLD AUTO: 256 10E3/UL (ref 150–450)
PLATELET # BLD AUTO: 258 10E3/UL (ref 150–450)
PLATELET # BLD AUTO: 260 10E3/UL (ref 150–450)
PLATELET # BLD AUTO: 265 10E3/UL (ref 150–450)
PLATELET # BLD AUTO: 278 10E3/UL (ref 150–450)
PLATELET # BLD AUTO: 280 10E3/UL (ref 150–450)
PLATELET # BLD AUTO: 285 10E3/UL (ref 150–450)
PLATELET # BLD AUTO: 291 10E3/UL (ref 150–450)
PLATELET # BLD AUTO: 295 10E3/UL (ref 150–450)
PLATELET # BLD AUTO: 340 10E3/UL (ref 150–450)
PLATELET # BLD AUTO: 344 10E3/UL (ref 150–450)
PLATELET # BLD AUTO: 345 10E3/UL (ref 150–450)
PO2 BLDV: 37 MM HG (ref 25–47)
PO2 BLDV: 49 MM HG (ref 25–47)
POTASSIUM SERPL-SCNC: 3.4 MMOL/L (ref 3.4–5.3)
POTASSIUM SERPL-SCNC: 3.4 MMOL/L (ref 3.4–5.3)
POTASSIUM SERPL-SCNC: 3.5 MMOL/L (ref 3.4–5.3)
POTASSIUM SERPL-SCNC: 3.6 MMOL/L (ref 3.4–5.3)
POTASSIUM SERPL-SCNC: 3.7 MMOL/L (ref 3.4–5.3)
POTASSIUM SERPL-SCNC: 3.7 MMOL/L (ref 3.4–5.3)
POTASSIUM SERPL-SCNC: 3.8 MMOL/L (ref 3.4–5.3)
POTASSIUM SERPL-SCNC: 3.9 MMOL/L (ref 3.4–5.3)
POTASSIUM SERPL-SCNC: 4 MMOL/L (ref 3.4–5.3)
POTASSIUM SERPL-SCNC: 4.2 MMOL/L (ref 3.4–5.3)
POTASSIUM SERPL-SCNC: 4.3 MMOL/L (ref 3.4–5.3)
POTASSIUM SERPL-SCNC: 4.3 MMOL/L (ref 3.4–5.3)
POTASSIUM SERPL-SCNC: 4.4 MMOL/L (ref 3.4–5.3)
POTASSIUM SERPL-SCNC: 4.6 MMOL/L (ref 3.4–5.3)
POTASSIUM SERPL-SCNC: 4.6 MMOL/L (ref 3.4–5.3)
POTASSIUM SERPL-SCNC: 4.7 MMOL/L (ref 3.4–5.3)
POTASSIUM SERPL-SCNC: 4.7 MMOL/L (ref 3.4–5.3)
POTASSIUM SERPL-SCNC: 4.8 MMOL/L (ref 3.4–5.3)
POTASSIUM SERPL-SCNC: 5 MMOL/L (ref 3.4–5.3)
POTASSIUM SERPL-SCNC: 5.3 MMOL/L (ref 3.4–5.3)
PR INTERVAL - MUSE: NORMAL MS
PROCALCITONIN SERPL IA-MCNC: 0.19 NG/ML
PROT SERPL-MCNC: 5.2 G/DL (ref 6.4–8.3)
PROT SERPL-MCNC: 5.3 G/DL (ref 6.4–8.3)
PROT SERPL-MCNC: 5.5 G/DL (ref 6.4–8.3)
PROT SERPL-MCNC: 5.6 G/DL (ref 6.4–8.3)
PROT SERPL-MCNC: 5.6 G/DL (ref 6.4–8.3)
PROT SERPL-MCNC: 6.2 G/DL (ref 6.4–8.3)
QRS DURATION - MUSE: 90 MS
QRS DURATION - MUSE: 94 MS
QRS DURATION - MUSE: 98 MS
QT - MUSE: 400 MS
QT - MUSE: 416 MS
QT - MUSE: 438 MS
QTC - MUSE: 445 MS
QTC - MUSE: 448 MS
QTC - MUSE: 467 MS
R AXIS - MUSE: 23 DEGREES
R AXIS - MUSE: 51 DEGREES
R AXIS - MUSE: 56 DEGREES
RADIOLOGIST FLAGS: ABNORMAL
RADIOLOGIST FLAGS: ABNORMAL
RBC # BLD AUTO: 3.49 10E6/UL (ref 4.4–5.9)
RBC # BLD AUTO: 3.6 10E6/UL (ref 4.4–5.9)
RBC # BLD AUTO: 3.62 10E6/UL (ref 4.4–5.9)
RBC # BLD AUTO: 3.72 10E6/UL (ref 4.4–5.9)
RBC # BLD AUTO: 3.74 10E6/UL (ref 4.4–5.9)
RBC # BLD AUTO: 3.75 10E6/UL (ref 4.4–5.9)
RBC # BLD AUTO: 3.78 10E6/UL (ref 4.4–5.9)
RBC # BLD AUTO: 3.8 10E6/UL (ref 4.4–5.9)
RBC # BLD AUTO: 3.8 10E6/UL (ref 4.4–5.9)
RBC # BLD AUTO: 3.92 10E6/UL (ref 4.4–5.9)
RBC # BLD AUTO: 3.95 10E6/UL (ref 4.4–5.9)
RBC # BLD AUTO: 3.96 10E6/UL (ref 4.4–5.9)
RBC # BLD AUTO: 3.96 10E6/UL (ref 4.4–5.9)
RBC # BLD AUTO: 4 10E6/UL (ref 4.4–5.9)
RBC # BLD AUTO: 4.09 10E6/UL (ref 4.4–5.9)
RBC # BLD AUTO: 4.15 10E6/UL (ref 4.4–5.9)
RBC # BLD AUTO: 4.25 10E6/UL (ref 4.4–5.9)
RBC # BLD AUTO: 4.27 10E6/UL (ref 4.4–5.9)
RBC URINE: 11 /HPF
RBC URINE: 34 /HPF
RBC URINE: 7 /HPF
SA TARGET DNA: NEGATIVE
SAO2 % BLDV: 66 % (ref 94–100)
SODIUM SERPL-SCNC: 132 MMOL/L (ref 135–145)
SODIUM SERPL-SCNC: 132 MMOL/L (ref 135–145)
SODIUM SERPL-SCNC: 135 MMOL/L (ref 135–145)
SODIUM SERPL-SCNC: 136 MMOL/L (ref 135–145)
SODIUM SERPL-SCNC: 137 MMOL/L (ref 135–145)
SODIUM SERPL-SCNC: 138 MMOL/L (ref 135–145)
SODIUM SERPL-SCNC: 139 MMOL/L (ref 135–145)
SODIUM SERPL-SCNC: 139 MMOL/L (ref 136–145)
SODIUM SERPL-SCNC: 140 MMOL/L (ref 135–145)
SODIUM SERPL-SCNC: 140 MMOL/L (ref 135–145)
SODIUM SERPL-SCNC: 141 MMOL/L (ref 135–145)
SODIUM SERPL-SCNC: 141 MMOL/L (ref 136–145)
SODIUM SERPL-SCNC: 141 MMOL/L (ref 136–145)
SODIUM SERPL-SCNC: 142 MMOL/L (ref 135–145)
SODIUM SERPL-SCNC: 143 MMOL/L (ref 135–145)
SP GR UR STRIP: 1.01 (ref 1–1.03)
SP GR UR STRIP: 1.02 (ref 1–1.03)
SP GR UR STRIP: 1.02 (ref 1–1.03)
SYSTOLIC BLOOD PRESSURE - MUSE: NORMAL MMHG
T AXIS - MUSE: -12 DEGREES
T AXIS - MUSE: -35 DEGREES
T AXIS - MUSE: -4 DEGREES
TRIGL SERPL-MCNC: 76 MG/DL
TROPONIN T SERPL HS-MCNC: 30 NG/L
TROPONIN T SERPL HS-MCNC: 34 NG/L
UROBILINOGEN UR STRIP-MCNC: NORMAL MG/DL
VENTRICULAR RATE- MUSE: 63 BPM
VENTRICULAR RATE- MUSE: 69 BPM
VENTRICULAR RATE- MUSE: 82 BPM
WBC # BLD AUTO: 11 10E3/UL (ref 4–11)
WBC # BLD AUTO: 11.2 10E3/UL (ref 4–11)
WBC # BLD AUTO: 11.2 10E3/UL (ref 4–11)
WBC # BLD AUTO: 11.6 10E3/UL (ref 4–11)
WBC # BLD AUTO: 11.8 10E3/UL (ref 4–11)
WBC # BLD AUTO: 11.8 10E3/UL (ref 4–11)
WBC # BLD AUTO: 11.9 10E3/UL (ref 4–11)
WBC # BLD AUTO: 12.1 10E3/UL (ref 4–11)
WBC # BLD AUTO: 13 10E3/UL (ref 4–11)
WBC # BLD AUTO: 13.2 10E3/UL (ref 4–11)
WBC # BLD AUTO: 13.6 10E3/UL (ref 4–11)
WBC # BLD AUTO: 15.6 10E3/UL (ref 4–11)
WBC # BLD AUTO: 15.6 10E3/UL (ref 4–11)
WBC # BLD AUTO: 16.5 10E3/UL (ref 4–11)
WBC # BLD AUTO: 17.4 10E3/UL (ref 4–11)
WBC # BLD AUTO: 18.5 10E3/UL (ref 4–11)
WBC # BLD AUTO: 29.6 10E3/UL (ref 4–11)
WBC # BLD AUTO: 30.2 10E3/UL (ref 4–11)
WBC CLUMPS #/AREA URNS HPF: PRESENT /HPF
WBC CLUMPS #/AREA URNS HPF: PRESENT /HPF
WBC URINE: 54 /HPF
WBC URINE: 90 /HPF
WBC URINE: >182 /HPF

## 2023-01-01 PROCEDURE — 99223 1ST HOSP IP/OBS HIGH 75: CPT | Performed by: INTERNAL MEDICINE

## 2023-01-01 PROCEDURE — 97166 OT EVAL MOD COMPLEX 45 MIN: CPT | Mod: GO

## 2023-01-01 PROCEDURE — 71045 X-RAY EXAM CHEST 1 VIEW: CPT

## 2023-01-01 PROCEDURE — 258N000003 HC RX IP 258 OP 636

## 2023-01-01 PROCEDURE — 99214 OFFICE O/P EST MOD 30 MIN: CPT | Performed by: INTERNAL MEDICINE

## 2023-01-01 PROCEDURE — 250N000011 HC RX IP 250 OP 636: Mod: JZ | Performed by: HOSPITALIST

## 2023-01-01 PROCEDURE — 250N000011 HC RX IP 250 OP 636: Performed by: EMERGENCY MEDICINE

## 2023-01-01 PROCEDURE — C9113 INJ PANTOPRAZOLE SODIUM, VIA: HCPCS | Performed by: HOSPITALIST

## 2023-01-01 PROCEDURE — 97530 THERAPEUTIC ACTIVITIES: CPT | Mod: GO

## 2023-01-01 PROCEDURE — 99223 1ST HOSP IP/OBS HIGH 75: CPT | Performed by: NURSE PRACTITIONER

## 2023-01-01 PROCEDURE — 258N000003 HC RX IP 258 OP 636: Performed by: NURSE PRACTITIONER

## 2023-01-01 PROCEDURE — 250N000013 HC RX MED GY IP 250 OP 250 PS 637: Performed by: INTERNAL MEDICINE

## 2023-01-01 PROCEDURE — 99291 CRITICAL CARE FIRST HOUR: CPT | Mod: FS | Performed by: PSYCHIATRY & NEUROLOGY

## 2023-01-01 PROCEDURE — 70450 CT HEAD/BRAIN W/O DYE: CPT

## 2023-01-01 PROCEDURE — 97110 THERAPEUTIC EXERCISES: CPT | Mod: GP

## 2023-01-01 PROCEDURE — 96372 THER/PROPH/DIAG INJ SC/IM: CPT | Mod: XS | Performed by: INTERNAL MEDICINE

## 2023-01-01 PROCEDURE — 80048 BASIC METABOLIC PNL TOTAL CA: CPT | Performed by: HOSPITALIST

## 2023-01-01 PROCEDURE — 93005 ELECTROCARDIOGRAM TRACING: CPT | Performed by: EMERGENCY MEDICINE

## 2023-01-01 PROCEDURE — 83605 ASSAY OF LACTIC ACID: CPT | Performed by: STUDENT IN AN ORGANIZED HEALTH CARE EDUCATION/TRAINING PROGRAM

## 2023-01-01 PROCEDURE — 250N000013 HC RX MED GY IP 250 OP 250 PS 637: Performed by: STUDENT IN AN ORGANIZED HEALTH CARE EDUCATION/TRAINING PROGRAM

## 2023-01-01 PROCEDURE — 83930 ASSAY OF BLOOD OSMOLALITY: CPT | Performed by: HOSPITALIST

## 2023-01-01 PROCEDURE — 99232 SBSQ HOSP IP/OBS MODERATE 35: CPT

## 2023-01-01 PROCEDURE — 250N000011 HC RX IP 250 OP 636: Performed by: HOSPITALIST

## 2023-01-01 PROCEDURE — 80048 BASIC METABOLIC PNL TOTAL CA: CPT | Performed by: INTERNAL MEDICINE

## 2023-01-01 PROCEDURE — 96366 THER/PROPH/DIAG IV INF ADDON: CPT

## 2023-01-01 PROCEDURE — 80048 BASIC METABOLIC PNL TOTAL CA: CPT | Performed by: EMERGENCY MEDICINE

## 2023-01-01 PROCEDURE — 97116 GAIT TRAINING THERAPY: CPT | Mod: GP

## 2023-01-01 PROCEDURE — 99222 1ST HOSP IP/OBS MODERATE 55: CPT | Mod: AI | Performed by: STUDENT IN AN ORGANIZED HEALTH CARE EDUCATION/TRAINING PROGRAM

## 2023-01-01 PROCEDURE — 87086 URINE CULTURE/COLONY COUNT: CPT | Performed by: HOSPITALIST

## 2023-01-01 PROCEDURE — 85025 COMPLETE CBC W/AUTO DIFF WBC: CPT | Performed by: EMERGENCY MEDICINE

## 2023-01-01 PROCEDURE — 99233 SBSQ HOSP IP/OBS HIGH 50: CPT | Performed by: HOSPITALIST

## 2023-01-01 PROCEDURE — 99233 SBSQ HOSP IP/OBS HIGH 50: CPT | Performed by: INTERNAL MEDICINE

## 2023-01-01 PROCEDURE — 87040 BLOOD CULTURE FOR BACTERIA: CPT | Performed by: HOSPITALIST

## 2023-01-01 PROCEDURE — 250N000013 HC RX MED GY IP 250 OP 250 PS 637: Performed by: CLINIC/CENTER

## 2023-01-01 PROCEDURE — 97530 THERAPEUTIC ACTIVITIES: CPT | Mod: GP

## 2023-01-01 PROCEDURE — 82010 KETONE BODYS QUAN: CPT | Performed by: CLINIC/CENTER

## 2023-01-01 PROCEDURE — 83605 ASSAY OF LACTIC ACID: CPT | Performed by: HOSPITALIST

## 2023-01-01 PROCEDURE — 250N000012 HC RX MED GY IP 250 OP 636 PS 637: Performed by: CLINICAL NURSE SPECIALIST

## 2023-01-01 PROCEDURE — 250N000011 HC RX IP 250 OP 636

## 2023-01-01 PROCEDURE — 80069 RENAL FUNCTION PANEL: CPT | Performed by: HOSPITALIST

## 2023-01-01 PROCEDURE — 120N000013 HC R&B IMCU

## 2023-01-01 PROCEDURE — 85025 COMPLETE CBC W/AUTO DIFF WBC: CPT | Performed by: STUDENT IN AN ORGANIZED HEALTH CARE EDUCATION/TRAINING PROGRAM

## 2023-01-01 PROCEDURE — 85025 COMPLETE CBC W/AUTO DIFF WBC: CPT | Performed by: HOSPITALIST

## 2023-01-01 PROCEDURE — 80048 BASIC METABOLIC PNL TOTAL CA: CPT | Performed by: NURSE PRACTITIONER

## 2023-01-01 PROCEDURE — 93306 TTE W/DOPPLER COMPLETE: CPT

## 2023-01-01 PROCEDURE — 82565 ASSAY OF CREATININE: CPT | Performed by: PHYSICIAN ASSISTANT

## 2023-01-01 PROCEDURE — 36415 COLL VENOUS BLD VENIPUNCTURE: CPT | Performed by: NURSE PRACTITIONER

## 2023-01-01 PROCEDURE — 96375 TX/PRO/DX INJ NEW DRUG ADDON: CPT

## 2023-01-01 PROCEDURE — 84100 ASSAY OF PHOSPHORUS: CPT | Performed by: CLINIC/CENTER

## 2023-01-01 PROCEDURE — 250N000011 HC RX IP 250 OP 636: Performed by: INTERNAL MEDICINE

## 2023-01-01 PROCEDURE — 70450 CT HEAD/BRAIN W/O DYE: CPT | Mod: 77

## 2023-01-01 PROCEDURE — 81001 URINALYSIS AUTO W/SCOPE: CPT | Performed by: STUDENT IN AN ORGANIZED HEALTH CARE EDUCATION/TRAINING PROGRAM

## 2023-01-01 PROCEDURE — 250N000011 HC RX IP 250 OP 636: Performed by: STUDENT IN AN ORGANIZED HEALTH CARE EDUCATION/TRAINING PROGRAM

## 2023-01-01 PROCEDURE — 99292 CRITICAL CARE ADDL 30 MIN: CPT | Mod: FS | Performed by: PSYCHIATRY & NEUROLOGY

## 2023-01-01 PROCEDURE — 99233 SBSQ HOSP IP/OBS HIGH 50: CPT | Performed by: CLINICAL NURSE SPECIALIST

## 2023-01-01 PROCEDURE — 99232 SBSQ HOSP IP/OBS MODERATE 35: CPT | Performed by: INTERNAL MEDICINE

## 2023-01-01 PROCEDURE — 250N000013 HC RX MED GY IP 250 OP 250 PS 637: Performed by: HOSPITALIST

## 2023-01-01 PROCEDURE — 36415 COLL VENOUS BLD VENIPUNCTURE: CPT | Performed by: INTERNAL MEDICINE

## 2023-01-01 PROCEDURE — 82962 GLUCOSE BLOOD TEST: CPT

## 2023-01-01 PROCEDURE — 72050 X-RAY EXAM NECK SPINE 4/5VWS: CPT

## 2023-01-01 PROCEDURE — 96376 TX/PRO/DX INJ SAME DRUG ADON: CPT

## 2023-01-01 PROCEDURE — 258N000003 HC RX IP 258 OP 636: Performed by: INTERNAL MEDICINE

## 2023-01-01 PROCEDURE — 96361 HYDRATE IV INFUSION ADD-ON: CPT | Performed by: EMERGENCY MEDICINE

## 2023-01-01 PROCEDURE — 93321 DOPPLER ECHO F-UP/LMTD STD: CPT | Mod: 26 | Performed by: INTERNAL MEDICINE

## 2023-01-01 PROCEDURE — 92526 ORAL FUNCTION THERAPY: CPT | Mod: GN | Performed by: SPEECH-LANGUAGE PATHOLOGIST

## 2023-01-01 PROCEDURE — 92611 MOTION FLUOROSCOPY/SWALLOW: CPT | Mod: GN | Performed by: SPEECH-LANGUAGE PATHOLOGIST

## 2023-01-01 PROCEDURE — 85027 COMPLETE CBC AUTOMATED: CPT | Performed by: INTERNAL MEDICINE

## 2023-01-01 PROCEDURE — 92610 EVALUATE SWALLOWING FUNCTION: CPT | Mod: GN | Performed by: SPEECH-LANGUAGE PATHOLOGIST

## 2023-01-01 PROCEDURE — 200N000001 HC R&B ICU

## 2023-01-01 PROCEDURE — 250N000009 HC RX 250: Performed by: EMERGENCY MEDICINE

## 2023-01-01 PROCEDURE — 83690 ASSAY OF LIPASE: CPT | Performed by: EMERGENCY MEDICINE

## 2023-01-01 PROCEDURE — 258N000003 HC RX IP 258 OP 636: Performed by: HOSPITALIST

## 2023-01-01 PROCEDURE — 99239 HOSP IP/OBS DSCHRG MGMT >30: CPT | Performed by: HOSPITALIST

## 2023-01-01 PROCEDURE — 83735 ASSAY OF MAGNESIUM: CPT | Performed by: CLINIC/CENTER

## 2023-01-01 PROCEDURE — G0378 HOSPITAL OBSERVATION PER HR: HCPCS

## 2023-01-01 PROCEDURE — 83930 ASSAY OF BLOOD OSMOLALITY: CPT | Performed by: CLINIC/CENTER

## 2023-01-01 PROCEDURE — 36415 COLL VENOUS BLD VENIPUNCTURE: CPT | Performed by: PHYSICIAN ASSISTANT

## 2023-01-01 PROCEDURE — 97535 SELF CARE MNGMENT TRAINING: CPT | Mod: GO | Performed by: OCCUPATIONAL THERAPIST

## 2023-01-01 PROCEDURE — 92526 ORAL FUNCTION THERAPY: CPT | Mod: GN

## 2023-01-01 PROCEDURE — 250N000012 HC RX MED GY IP 250 OP 636 PS 637: Performed by: HOSPITALIST

## 2023-01-01 PROCEDURE — A9585 GADOBUTROL INJECTION: HCPCS | Performed by: PHYSICIAN ASSISTANT

## 2023-01-01 PROCEDURE — 99222 1ST HOSP IP/OBS MODERATE 55: CPT

## 2023-01-01 PROCEDURE — 97535 SELF CARE MNGMENT TRAINING: CPT | Mod: GO

## 2023-01-01 PROCEDURE — 93325 DOPPLER ECHO COLOR FLOW MAPG: CPT | Mod: 26 | Performed by: INTERNAL MEDICINE

## 2023-01-01 PROCEDURE — 80048 BASIC METABOLIC PNL TOTAL CA: CPT | Performed by: STUDENT IN AN ORGANIZED HEALTH CARE EDUCATION/TRAINING PROGRAM

## 2023-01-01 PROCEDURE — 99214 OFFICE O/P EST MOD 30 MIN: CPT | Performed by: PHYSICIAN ASSISTANT

## 2023-01-01 PROCEDURE — 36415 COLL VENOUS BLD VENIPUNCTURE: CPT

## 2023-01-01 PROCEDURE — 82374 ASSAY BLOOD CARBON DIOXIDE: CPT | Performed by: HOSPITALIST

## 2023-01-01 PROCEDURE — 72125 CT NECK SPINE W/O DYE: CPT

## 2023-01-01 PROCEDURE — 99207 PR APP CREDIT; MD BILLING SHARED VISIT: CPT | Performed by: NURSE PRACTITIONER

## 2023-01-01 PROCEDURE — 258N000001 HC RX 258: Performed by: INTERNAL MEDICINE

## 2023-01-01 PROCEDURE — 120N000002 HC R&B MED SURG/OB UMMC

## 2023-01-01 PROCEDURE — 36415 COLL VENOUS BLD VENIPUNCTURE: CPT | Performed by: EMERGENCY MEDICINE

## 2023-01-01 PROCEDURE — 96125 COGNITIVE TEST BY HC PRO: CPT | Mod: GN

## 2023-01-01 PROCEDURE — 258N000002 HC RX IP 258 OP 250: Performed by: HOSPITALIST

## 2023-01-01 PROCEDURE — 83605 ASSAY OF LACTIC ACID: CPT | Performed by: NURSE PRACTITIONER

## 2023-01-01 PROCEDURE — 120N000001 HC R&B MED SURG/OB

## 2023-01-01 PROCEDURE — 93005 ELECTROCARDIOGRAM TRACING: CPT

## 2023-01-01 PROCEDURE — 36415 COLL VENOUS BLD VENIPUNCTURE: CPT | Performed by: STUDENT IN AN ORGANIZED HEALTH CARE EDUCATION/TRAINING PROGRAM

## 2023-01-01 PROCEDURE — 85027 COMPLETE CBC AUTOMATED: CPT | Performed by: HOSPITALIST

## 2023-01-01 PROCEDURE — 250N000011 HC RX IP 250 OP 636: Mod: JZ | Performed by: STUDENT IN AN ORGANIZED HEALTH CARE EDUCATION/TRAINING PROGRAM

## 2023-01-01 PROCEDURE — 99232 SBSQ HOSP IP/OBS MODERATE 35: CPT | Performed by: PHYSICAL MEDICINE & REHABILITATION

## 2023-01-01 PROCEDURE — 99232 SBSQ HOSP IP/OBS MODERATE 35: CPT | Performed by: HOSPITALIST

## 2023-01-01 PROCEDURE — 250N000009 HC RX 250: Performed by: HOSPITALIST

## 2023-01-01 PROCEDURE — 258N000001 HC RX 258: Performed by: HOSPITALIST

## 2023-01-01 PROCEDURE — 128N000003 HC R&B REHAB

## 2023-01-01 PROCEDURE — 99284 EMERGENCY DEPT VISIT MOD MDM: CPT

## 2023-01-01 PROCEDURE — 250N000009 HC RX 250: Performed by: NURSE PRACTITIONER

## 2023-01-01 PROCEDURE — 96372 THER/PROPH/DIAG INJ SC/IM: CPT | Performed by: INTERNAL MEDICINE

## 2023-01-01 PROCEDURE — 250N000012 HC RX MED GY IP 250 OP 636 PS 637: Performed by: INTERNAL MEDICINE

## 2023-01-01 PROCEDURE — 82803 BLOOD GASES ANY COMBINATION: CPT

## 2023-01-01 PROCEDURE — 96374 THER/PROPH/DIAG INJ IV PUSH: CPT

## 2023-01-01 PROCEDURE — 84484 ASSAY OF TROPONIN QUANT: CPT | Performed by: EMERGENCY MEDICINE

## 2023-01-01 PROCEDURE — 250N000012 HC RX MED GY IP 250 OP 636 PS 637: Performed by: NURSE PRACTITIONER

## 2023-01-01 PROCEDURE — 82248 BILIRUBIN DIRECT: CPT | Performed by: CLINIC/CENTER

## 2023-01-01 PROCEDURE — 97530 THERAPEUTIC ACTIVITIES: CPT | Mod: GO | Performed by: OCCUPATIONAL THERAPIST

## 2023-01-01 PROCEDURE — 36415 COLL VENOUS BLD VENIPUNCTURE: CPT | Performed by: HOSPITALIST

## 2023-01-01 PROCEDURE — 85730 THROMBOPLASTIN TIME PARTIAL: CPT | Performed by: EMERGENCY MEDICINE

## 2023-01-01 PROCEDURE — 93325 DOPPLER ECHO COLOR FLOW MAPG: CPT

## 2023-01-01 PROCEDURE — 99231 SBSQ HOSP IP/OBS SF/LOW 25: CPT | Performed by: PHYSICIAN ASSISTANT

## 2023-01-01 PROCEDURE — 99207 PR APP CREDIT; MD BILLING SHARED VISIT: CPT | Performed by: INTERNAL MEDICINE

## 2023-01-01 PROCEDURE — 82248 BILIRUBIN DIRECT: CPT | Performed by: HOSPITALIST

## 2023-01-01 PROCEDURE — 85004 AUTOMATED DIFF WBC COUNT: CPT | Performed by: HOSPITALIST

## 2023-01-01 PROCEDURE — 250N000013 HC RX MED GY IP 250 OP 250 PS 637: Performed by: PHYSICIAN ASSISTANT

## 2023-01-01 PROCEDURE — 93010 ELECTROCARDIOGRAM REPORT: CPT | Performed by: EMERGENCY MEDICINE

## 2023-01-01 PROCEDURE — 97116 GAIT TRAINING THERAPY: CPT | Mod: GP | Performed by: PHYSICAL THERAPIST

## 2023-01-01 PROCEDURE — 99213 OFFICE O/P EST LOW 20 MIN: CPT | Mod: VID | Performed by: NURSE PRACTITIONER

## 2023-01-01 PROCEDURE — 74230 X-RAY XM SWLNG FUNCJ C+: CPT

## 2023-01-01 PROCEDURE — 87086 URINE CULTURE/COLONY COUNT: CPT | Performed by: EMERGENCY MEDICINE

## 2023-01-01 PROCEDURE — 80053 COMPREHEN METABOLIC PANEL: CPT | Performed by: EMERGENCY MEDICINE

## 2023-01-01 PROCEDURE — 80048 BASIC METABOLIC PNL TOTAL CA: CPT | Performed by: PHYSICIAN ASSISTANT

## 2023-01-01 PROCEDURE — 83735 ASSAY OF MAGNESIUM: CPT | Performed by: HOSPITALIST

## 2023-01-01 PROCEDURE — 99233 SBSQ HOSP IP/OBS HIGH 50: CPT | Mod: FS | Performed by: PSYCHIATRY & NEUROLOGY

## 2023-01-01 PROCEDURE — 70450 CT HEAD/BRAIN W/O DYE: CPT | Mod: 26 | Performed by: RADIOLOGY

## 2023-01-01 PROCEDURE — 70496 CT ANGIOGRAPHY HEAD: CPT

## 2023-01-01 PROCEDURE — 82803 BLOOD GASES ANY COMBINATION: CPT | Performed by: HOSPITALIST

## 2023-01-01 PROCEDURE — 99222 1ST HOSP IP/OBS MODERATE 55: CPT | Performed by: PHYSICIAN ASSISTANT

## 2023-01-01 PROCEDURE — 93306 TTE W/DOPPLER COMPLETE: CPT | Mod: 26 | Performed by: INTERNAL MEDICINE

## 2023-01-01 PROCEDURE — 99215 OFFICE O/P EST HI 40 MIN: CPT | Performed by: NURSE PRACTITIONER

## 2023-01-01 PROCEDURE — 99222 1ST HOSP IP/OBS MODERATE 55: CPT | Performed by: NURSE PRACTITIONER

## 2023-01-01 PROCEDURE — 99285 EMERGENCY DEPT VISIT HI MDM: CPT | Mod: 25 | Performed by: EMERGENCY MEDICINE

## 2023-01-01 PROCEDURE — 83880 ASSAY OF NATRIURETIC PEPTIDE: CPT | Performed by: INTERNAL MEDICINE

## 2023-01-01 PROCEDURE — 258N000003 HC RX IP 258 OP 636: Performed by: EMERGENCY MEDICINE

## 2023-01-01 PROCEDURE — 80061 LIPID PANEL: CPT | Performed by: PHYSICIAN ASSISTANT

## 2023-01-01 PROCEDURE — 250N000011 HC RX IP 250 OP 636: Mod: JZ | Performed by: INTERNAL MEDICINE

## 2023-01-01 PROCEDURE — 81001 URINALYSIS AUTO W/SCOPE: CPT | Performed by: HOSPITALIST

## 2023-01-01 PROCEDURE — 99284 EMERGENCY DEPT VISIT MOD MDM: CPT | Mod: 25

## 2023-01-01 PROCEDURE — 85027 COMPLETE CBC AUTOMATED: CPT | Performed by: STUDENT IN AN ORGANIZED HEALTH CARE EDUCATION/TRAINING PROGRAM

## 2023-01-01 PROCEDURE — 87040 BLOOD CULTURE FOR BACTERIA: CPT | Performed by: EMERGENCY MEDICINE

## 2023-01-01 PROCEDURE — 87640 STAPH A DNA AMP PROBE: CPT | Performed by: HOSPITALIST

## 2023-01-01 PROCEDURE — 92610 EVALUATE SWALLOWING FUNCTION: CPT | Mod: GN

## 2023-01-01 PROCEDURE — 82140 ASSAY OF AMMONIA: CPT | Performed by: STUDENT IN AN ORGANIZED HEALTH CARE EDUCATION/TRAINING PROGRAM

## 2023-01-01 PROCEDURE — 97161 PT EVAL LOW COMPLEX 20 MIN: CPT | Mod: GP | Performed by: PHYSICAL THERAPIST

## 2023-01-01 PROCEDURE — 70553 MRI BRAIN STEM W/O & W/DYE: CPT

## 2023-01-01 PROCEDURE — 85610 PROTHROMBIN TIME: CPT | Performed by: EMERGENCY MEDICINE

## 2023-01-01 PROCEDURE — 83605 ASSAY OF LACTIC ACID: CPT | Performed by: INTERNAL MEDICINE

## 2023-01-01 PROCEDURE — 250N000011 HC RX IP 250 OP 636: Mod: JZ | Performed by: PHYSICIAN ASSISTANT

## 2023-01-01 PROCEDURE — 84100 ASSAY OF PHOSPHORUS: CPT | Performed by: HOSPITALIST

## 2023-01-01 PROCEDURE — 99233 SBSQ HOSP IP/OBS HIGH 50: CPT | Performed by: PHYSICIAN ASSISTANT

## 2023-01-01 PROCEDURE — 84145 PROCALCITONIN (PCT): CPT | Performed by: HOSPITALIST

## 2023-01-01 PROCEDURE — 83880 ASSAY OF NATRIURETIC PEPTIDE: CPT

## 2023-01-01 PROCEDURE — 97112 NEUROMUSCULAR REEDUCATION: CPT | Mod: GO | Performed by: OCCUPATIONAL THERAPIST

## 2023-01-01 PROCEDURE — 250N000012 HC RX MED GY IP 250 OP 636 PS 637: Performed by: STUDENT IN AN ORGANIZED HEALTH CARE EDUCATION/TRAINING PROGRAM

## 2023-01-01 PROCEDURE — 99223 1ST HOSP IP/OBS HIGH 75: CPT | Performed by: HOSPITALIST

## 2023-01-01 PROCEDURE — 82565 ASSAY OF CREATININE: CPT

## 2023-01-01 PROCEDURE — 93308 TTE F-UP OR LMTD: CPT | Mod: 26 | Performed by: INTERNAL MEDICINE

## 2023-01-01 PROCEDURE — 87186 SC STD MICRODIL/AGAR DIL: CPT | Performed by: STUDENT IN AN ORGANIZED HEALTH CARE EDUCATION/TRAINING PROGRAM

## 2023-01-01 PROCEDURE — 83036 HEMOGLOBIN GLYCOSYLATED A1C: CPT | Performed by: PHYSICIAN ASSISTANT

## 2023-01-01 PROCEDURE — 96365 THER/PROPH/DIAG IV INF INIT: CPT | Performed by: EMERGENCY MEDICINE

## 2023-01-01 PROCEDURE — 87086 URINE CULTURE/COLONY COUNT: CPT | Performed by: STUDENT IN AN ORGANIZED HEALTH CARE EDUCATION/TRAINING PROGRAM

## 2023-01-01 PROCEDURE — 97530 THERAPEUTIC ACTIVITIES: CPT | Mod: GP | Performed by: PHYSICAL THERAPIST

## 2023-01-01 PROCEDURE — 84295 ASSAY OF SERUM SODIUM: CPT | Mod: 91 | Performed by: NURSE PRACTITIONER

## 2023-01-01 PROCEDURE — 99239 HOSP IP/OBS DSCHRG MGMT >30: CPT | Performed by: INTERNAL MEDICINE

## 2023-01-01 PROCEDURE — 85049 AUTOMATED PLATELET COUNT: CPT | Performed by: PHYSICIAN ASSISTANT

## 2023-01-01 PROCEDURE — 80053 COMPREHEN METABOLIC PANEL: CPT | Performed by: INTERNAL MEDICINE

## 2023-01-01 PROCEDURE — 85018 HEMOGLOBIN: CPT | Performed by: NURSE PRACTITIONER

## 2023-01-01 PROCEDURE — 87641 MR-STAPH DNA AMP PROBE: CPT | Performed by: HOSPITALIST

## 2023-01-01 PROCEDURE — 82374 ASSAY BLOOD CARBON DIOXIDE: CPT | Performed by: INTERNAL MEDICINE

## 2023-01-01 PROCEDURE — 255N000002 HC RX 255 OP 636: Performed by: PHYSICIAN ASSISTANT

## 2023-01-01 PROCEDURE — 82010 KETONE BODYS QUAN: CPT | Performed by: EMERGENCY MEDICINE

## 2023-01-01 PROCEDURE — 97112 NEUROMUSCULAR REEDUCATION: CPT | Mod: GP | Performed by: PHYSICAL THERAPIST

## 2023-01-01 PROCEDURE — 30283B1 TRANSFUSION OF NONAUTOLOGOUS 4-FACTOR PROTHROMBIN COMPLEX CONCENTRATE INTO VEIN, PERCUTANEOUS APPROACH: ICD-10-PCS | Performed by: EMERGENCY MEDICINE

## 2023-01-01 PROCEDURE — 97112 NEUROMUSCULAR REEDUCATION: CPT | Mod: GO

## 2023-01-01 PROCEDURE — 93010 ELECTROCARDIOGRAM REPORT: CPT | Performed by: INTERNAL MEDICINE

## 2023-01-01 PROCEDURE — 82010 KETONE BODYS QUAN: CPT | Performed by: HOSPITALIST

## 2023-01-01 PROCEDURE — 97162 PT EVAL MOD COMPLEX 30 MIN: CPT | Mod: GP

## 2023-01-01 PROCEDURE — 96367 TX/PROPH/DG ADDL SEQ IV INF: CPT

## 2023-01-01 PROCEDURE — 250N000012 HC RX MED GY IP 250 OP 636 PS 637: Performed by: EMERGENCY MEDICINE

## 2023-01-01 PROCEDURE — 99291 CRITICAL CARE FIRST HOUR: CPT | Mod: 25

## 2023-01-01 PROCEDURE — 85025 COMPLETE CBC W/AUTO DIFF WBC: CPT | Performed by: INTERNAL MEDICINE

## 2023-01-01 PROCEDURE — 80053 COMPREHEN METABOLIC PANEL: CPT | Performed by: STUDENT IN AN ORGANIZED HEALTH CARE EDUCATION/TRAINING PROGRAM

## 2023-01-01 PROCEDURE — 85014 HEMATOCRIT: CPT | Performed by: HOSPITALIST

## 2023-01-01 PROCEDURE — 99232 SBSQ HOSP IP/OBS MODERATE 35: CPT | Performed by: PHYSICIAN ASSISTANT

## 2023-01-01 PROCEDURE — 82010 KETONE BODYS QUAN: CPT | Mod: 91 | Performed by: NURSE PRACTITIONER

## 2023-01-01 PROCEDURE — 81001 URINALYSIS AUTO W/SCOPE: CPT | Performed by: EMERGENCY MEDICINE

## 2023-01-01 PROCEDURE — 85014 HEMATOCRIT: CPT | Performed by: INTERNAL MEDICINE

## 2023-01-01 RX ORDER — METOCLOPRAMIDE HYDROCHLORIDE 5 MG/ML
5 INJECTION INTRAMUSCULAR; INTRAVENOUS ONCE
Status: COMPLETED | OUTPATIENT
Start: 2023-01-01 | End: 2023-01-01

## 2023-01-01 RX ORDER — HYDRALAZINE HYDROCHLORIDE 20 MG/ML
10 INJECTION INTRAMUSCULAR; INTRAVENOUS ONCE
Status: COMPLETED | OUTPATIENT
Start: 2023-01-01 | End: 2023-01-01

## 2023-01-01 RX ORDER — CEFUROXIME AXETIL 500 MG/1
500 TABLET ORAL 2 TIMES DAILY
Start: 2023-01-01 | End: 2023-01-01

## 2023-01-01 RX ORDER — DEXTROSE MONOHYDRATE 25 G/50ML
25-50 INJECTION, SOLUTION INTRAVENOUS
Status: DISCONTINUED | OUTPATIENT
Start: 2023-01-01 | End: 2023-01-01

## 2023-01-01 RX ORDER — AMOXICILLIN 250 MG
1-2 CAPSULE ORAL 2 TIMES DAILY PRN
Status: DISCONTINUED | OUTPATIENT
Start: 2023-01-01 | End: 2023-01-01 | Stop reason: HOSPADM

## 2023-01-01 RX ORDER — NICOTINE POLACRILEX 4 MG
15-30 LOZENGE BUCCAL
Status: DISCONTINUED | OUTPATIENT
Start: 2023-01-01 | End: 2023-01-01 | Stop reason: HOSPADM

## 2023-01-01 RX ORDER — CARVEDILOL 12.5 MG/1
12.5 TABLET ORAL 2 TIMES DAILY WITH MEALS
Status: DISCONTINUED | OUTPATIENT
Start: 2023-01-01 | End: 2023-01-01 | Stop reason: HOSPADM

## 2023-01-01 RX ORDER — SACUBITRIL AND VALSARTAN 49; 51 MG/1; MG/1
1 TABLET, FILM COATED ORAL 2 TIMES DAILY
Qty: 180 TABLET | Refills: 3 | Status: ON HOLD | OUTPATIENT
Start: 2023-01-01 | End: 2023-01-01

## 2023-01-01 RX ORDER — IOPAMIDOL 755 MG/ML
67 INJECTION, SOLUTION INTRAVASCULAR ONCE
Status: COMPLETED | OUTPATIENT
Start: 2023-01-01 | End: 2023-01-01

## 2023-01-01 RX ORDER — DIPHENHYDRAMINE HYDROCHLORIDE 50 MG/ML
25 INJECTION INTRAMUSCULAR; INTRAVENOUS ONCE
Status: COMPLETED | OUTPATIENT
Start: 2023-01-01 | End: 2023-01-01

## 2023-01-01 RX ORDER — DEXTROSE MONOHYDRATE 25 G/50ML
25-50 INJECTION, SOLUTION INTRAVENOUS
Status: DISCONTINUED | OUTPATIENT
Start: 2023-01-01 | End: 2024-01-01 | Stop reason: HOSPADM

## 2023-01-01 RX ORDER — ONDANSETRON 2 MG/ML
4 INJECTION INTRAMUSCULAR; INTRAVENOUS ONCE
Status: COMPLETED | OUTPATIENT
Start: 2023-01-01 | End: 2023-01-01

## 2023-01-01 RX ORDER — INSULIN LISPRO 100 [IU]/ML
1-7 INJECTION, SOLUTION INTRAVENOUS; SUBCUTANEOUS
Status: ON HOLD
Start: 2023-01-01 | End: 2024-01-01

## 2023-01-01 RX ORDER — DOXYCYCLINE HYCLATE 50 MG/1
CAPSULE ORAL
Status: ON HOLD | COMMUNITY
End: 2023-01-01

## 2023-01-01 RX ORDER — ENOXAPARIN SODIUM 100 MG/ML
40 INJECTION SUBCUTANEOUS EVERY 24 HOURS
Status: DISCONTINUED | OUTPATIENT
Start: 2023-01-01 | End: 2023-01-01

## 2023-01-01 RX ORDER — LABETALOL HYDROCHLORIDE 5 MG/ML
10 INJECTION, SOLUTION INTRAVENOUS EVERY 4 HOURS PRN
Status: DISCONTINUED | OUTPATIENT
Start: 2023-01-01 | End: 2023-01-01

## 2023-01-01 RX ORDER — CLONIDINE HYDROCHLORIDE 0.1 MG/1
0.1 TABLET ORAL EVERY 4 HOURS PRN
Status: DISCONTINUED | OUTPATIENT
Start: 2023-01-01 | End: 2023-01-01

## 2023-01-01 RX ORDER — AMLODIPINE BESYLATE 10 MG/1
10 TABLET ORAL DAILY
Status: DISCONTINUED | OUTPATIENT
Start: 2023-01-01 | End: 2023-01-01 | Stop reason: HOSPADM

## 2023-01-01 RX ORDER — CEFAZOLIN SODIUM 1 G/50ML
750 SOLUTION INTRAVENOUS EVERY 24 HOURS
Status: DISCONTINUED | OUTPATIENT
Start: 2023-01-01 | End: 2023-01-01

## 2023-01-01 RX ORDER — PHENOL 1.4 %
10 AEROSOL, SPRAY (ML) MUCOUS MEMBRANE
Start: 2023-01-01 | End: 2024-01-01

## 2023-01-01 RX ORDER — LIDOCAINE 40 MG/G
CREAM TOPICAL
Status: DISCONTINUED | OUTPATIENT
Start: 2023-01-01 | End: 2023-01-01 | Stop reason: HOSPADM

## 2023-01-01 RX ORDER — AMOXICILLIN 250 MG
1-2 CAPSULE ORAL 2 TIMES DAILY PRN
Status: DISCONTINUED | OUTPATIENT
Start: 2023-01-01 | End: 2024-01-01 | Stop reason: HOSPADM

## 2023-01-01 RX ORDER — CARVEDILOL 25 MG/1
25 TABLET ORAL 2 TIMES DAILY WITH MEALS
Status: DISCONTINUED | OUTPATIENT
Start: 2023-01-01 | End: 2023-01-01

## 2023-01-01 RX ORDER — TRAZODONE HYDROCHLORIDE 50 MG/1
50 TABLET, FILM COATED ORAL AT BEDTIME
Status: DISCONTINUED | OUTPATIENT
Start: 2023-01-01 | End: 2023-01-01

## 2023-01-01 RX ORDER — CEFUROXIME AXETIL 500 MG/1
500 TABLET ORAL EVERY 12 HOURS SCHEDULED
Qty: 8 TABLET | Refills: 0 | Status: COMPLETED | OUTPATIENT
Start: 2023-01-01 | End: 2023-01-01

## 2023-01-01 RX ORDER — ACETAMINOPHEN 325 MG/1
650 TABLET ORAL EVERY 6 HOURS PRN
Status: DISCONTINUED | OUTPATIENT
Start: 2023-01-01 | End: 2024-01-01 | Stop reason: HOSPADM

## 2023-01-01 RX ORDER — ACETAMINOPHEN 325 MG/1
650 TABLET ORAL EVERY 6 HOURS PRN
Status: DISCONTINUED | OUTPATIENT
Start: 2023-01-01 | End: 2023-01-01 | Stop reason: HOSPADM

## 2023-01-01 RX ORDER — ISOSORBIDE MONONITRATE 60 MG/1
60 TABLET, EXTENDED RELEASE ORAL EVERY EVENING
Status: DISCONTINUED | OUTPATIENT
Start: 2023-01-01 | End: 2024-01-01 | Stop reason: HOSPADM

## 2023-01-01 RX ORDER — TORSEMIDE 10 MG/1
10 TABLET ORAL PRN
Qty: 90 TABLET | Refills: 3 | Status: ON HOLD | OUTPATIENT
Start: 2023-01-01 | End: 2023-01-01

## 2023-01-01 RX ORDER — CEFTRIAXONE 1 G/1
1 INJECTION, POWDER, FOR SOLUTION INTRAMUSCULAR; INTRAVENOUS EVERY 24 HOURS
Qty: 30 ML | Refills: 0 | Status: COMPLETED | OUTPATIENT
Start: 2023-01-01 | End: 2024-01-01

## 2023-01-01 RX ORDER — CARVEDILOL 6.25 MG/1
12.5 TABLET ORAL 2 TIMES DAILY WITH MEALS
Status: DISCONTINUED | OUTPATIENT
Start: 2023-01-01 | End: 2023-01-01 | Stop reason: HOSPADM

## 2023-01-01 RX ORDER — AMOXICILLIN 250 MG
1 CAPSULE ORAL 2 TIMES DAILY PRN
Status: DISCONTINUED | OUTPATIENT
Start: 2023-01-01 | End: 2023-01-01

## 2023-01-01 RX ORDER — ONDANSETRON 2 MG/ML
4 INJECTION INTRAMUSCULAR; INTRAVENOUS EVERY 6 HOURS PRN
Status: DISCONTINUED | OUTPATIENT
Start: 2023-01-01 | End: 2023-01-01 | Stop reason: HOSPADM

## 2023-01-01 RX ORDER — NICOTINE POLACRILEX 4 MG
15-30 LOZENGE BUCCAL
Status: DISCONTINUED | OUTPATIENT
Start: 2023-01-01 | End: 2023-01-01

## 2023-01-01 RX ORDER — GLIMEPIRIDE 2 MG/1
2 TABLET ORAL
Status: DISCONTINUED | OUTPATIENT
Start: 2023-01-01 | End: 2023-01-01

## 2023-01-01 RX ORDER — CARVEDILOL 12.5 MG/1
12.5 TABLET ORAL 2 TIMES DAILY WITH MEALS
Status: DISCONTINUED | OUTPATIENT
Start: 2023-01-01 | End: 2024-01-01 | Stop reason: HOSPADM

## 2023-01-01 RX ORDER — TORSEMIDE 20 MG/1
20 TABLET ORAL DAILY
Qty: 120 TABLET | Refills: 1 | Status: SHIPPED | OUTPATIENT
Start: 2023-01-01 | End: 2023-01-01

## 2023-01-01 RX ORDER — ENOXAPARIN SODIUM 100 MG/ML
30 INJECTION SUBCUTANEOUS EVERY 24 HOURS
Status: DISCONTINUED | OUTPATIENT
Start: 2023-01-01 | End: 2023-01-01

## 2023-01-01 RX ORDER — TRAMADOL HYDROCHLORIDE 100 MG/1
100 TABLET, EXTENDED RELEASE ORAL DAILY PRN
Qty: 6 TABLET | Refills: 0 | Status: ON HOLD | OUTPATIENT
Start: 2023-01-01 | End: 2024-01-01

## 2023-01-01 RX ORDER — ENOXAPARIN SODIUM 100 MG/ML
30 INJECTION SUBCUTANEOUS EVERY 24 HOURS
Status: DISCONTINUED | OUTPATIENT
Start: 2023-01-01 | End: 2024-01-01

## 2023-01-01 RX ORDER — ENOXAPARIN SODIUM 100 MG/ML
30 INJECTION SUBCUTANEOUS EVERY 24 HOURS
Status: DISCONTINUED | OUTPATIENT
Start: 2023-01-01 | End: 2023-01-01 | Stop reason: HOSPADM

## 2023-01-01 RX ORDER — DEXTROSE MONOHYDRATE 25 G/50ML
25-50 INJECTION, SOLUTION INTRAVENOUS
Status: DISCONTINUED | OUTPATIENT
Start: 2023-01-01 | End: 2023-01-01 | Stop reason: HOSPADM

## 2023-01-01 RX ORDER — CARVEDILOL 6.25 MG/1
12.5 TABLET ORAL 2 TIMES DAILY WITH MEALS
Status: DISCONTINUED | OUTPATIENT
Start: 2023-01-01 | End: 2023-01-01

## 2023-01-01 RX ORDER — ACETAMINOPHEN 325 MG/1
650 TABLET ORAL EVERY 4 HOURS PRN
Status: DISCONTINUED | OUTPATIENT
Start: 2023-01-01 | End: 2023-01-01 | Stop reason: HOSPADM

## 2023-01-01 RX ORDER — FUROSEMIDE 20 MG
20 TABLET ORAL DAILY
Qty: 90 TABLET | Refills: 1 | Status: SHIPPED | OUTPATIENT
Start: 2023-01-01 | End: 2023-01-01

## 2023-01-01 RX ORDER — SODIUM CHLORIDE 450 MG/100ML
INJECTION, SOLUTION INTRAVENOUS CONTINUOUS
Status: DISCONTINUED | OUTPATIENT
Start: 2023-01-01 | End: 2023-01-01 | Stop reason: HOSPADM

## 2023-01-01 RX ORDER — TAMSULOSIN HYDROCHLORIDE 0.4 MG/1
0.4 CAPSULE ORAL EVERY MORNING
Status: DISCONTINUED | OUTPATIENT
Start: 2023-01-01 | End: 2023-01-01 | Stop reason: HOSPADM

## 2023-01-01 RX ORDER — PHENOL 1.4 %
10 AEROSOL, SPRAY (ML) MUCOUS MEMBRANE AT BEDTIME
DISCHARGE
Start: 2023-01-01 | End: 2024-01-01

## 2023-01-01 RX ORDER — CEFTRIAXONE 2 G/1
2 INJECTION, POWDER, FOR SOLUTION INTRAMUSCULAR; INTRAVENOUS EVERY 24 HOURS
Status: DISCONTINUED | OUTPATIENT
Start: 2023-01-01 | End: 2023-01-01

## 2023-01-01 RX ORDER — BARIUM SULFATE 400 MG/ML
SUSPENSION ORAL ONCE
Status: COMPLETED | OUTPATIENT
Start: 2023-01-01 | End: 2023-01-01

## 2023-01-01 RX ORDER — CARVEDILOL 12.5 MG/1
25 TABLET ORAL 2 TIMES DAILY WITH MEALS
Status: ON HOLD
Start: 2023-01-01 | End: 2023-01-01

## 2023-01-01 RX ORDER — IRBESARTAN 150 MG/1
300 TABLET ORAL AT BEDTIME
Status: DISCONTINUED | OUTPATIENT
Start: 2023-01-01 | End: 2023-01-01 | Stop reason: HOSPADM

## 2023-01-01 RX ORDER — PANTOPRAZOLE SODIUM 40 MG/1
40 TABLET, DELAYED RELEASE ORAL
Status: DISCONTINUED | OUTPATIENT
Start: 2023-01-01 | End: 2023-01-01

## 2023-01-01 RX ORDER — TORSEMIDE 10 MG/1
10 TABLET ORAL DAILY
Qty: 90 TABLET | Refills: 3 | Status: SHIPPED | OUTPATIENT
Start: 2023-01-01 | End: 2023-01-01

## 2023-01-01 RX ORDER — AMLODIPINE BESYLATE 5 MG/1
5 TABLET ORAL ONCE
Status: COMPLETED | OUTPATIENT
Start: 2023-01-01 | End: 2023-01-01

## 2023-01-01 RX ORDER — IRBESARTAN 300 MG/1
150 TABLET ORAL AT BEDTIME
Qty: 90 TABLET | Refills: 4 | Status: SHIPPED | OUTPATIENT
Start: 2023-01-01 | End: 2023-01-01

## 2023-01-01 RX ORDER — AMOXICILLIN 250 MG
1 CAPSULE ORAL AT BEDTIME
Status: ON HOLD
Start: 2023-01-01 | End: 2023-01-01

## 2023-01-01 RX ORDER — NICOTINE POLACRILEX 4 MG
15-30 LOZENGE BUCCAL
Status: DISCONTINUED | OUTPATIENT
Start: 2023-01-01 | End: 2024-01-01 | Stop reason: HOSPADM

## 2023-01-01 RX ORDER — NITROGLYCERIN 0.4 MG/1
0.4 TABLET SUBLINGUAL EVERY 5 MIN PRN
Status: DISCONTINUED | OUTPATIENT
Start: 2023-01-01 | End: 2023-01-01 | Stop reason: HOSPADM

## 2023-01-01 RX ORDER — PROCHLORPERAZINE 25 MG
12.5 SUPPOSITORY, RECTAL RECTAL EVERY 12 HOURS PRN
Status: DISCONTINUED | OUTPATIENT
Start: 2023-01-01 | End: 2023-01-01 | Stop reason: HOSPADM

## 2023-01-01 RX ORDER — CEFTRIAXONE 1 G/1
1 INJECTION, POWDER, FOR SOLUTION INTRAMUSCULAR; INTRAVENOUS EVERY 24 HOURS
Status: DISCONTINUED | OUTPATIENT
Start: 2023-01-01 | End: 2023-01-01 | Stop reason: HOSPADM

## 2023-01-01 RX ORDER — CARVEDILOL 12.5 MG/1
12.5 TABLET ORAL 2 TIMES DAILY WITH MEALS
Qty: 180 TABLET | Refills: 3 | Status: ON HOLD | OUTPATIENT
Start: 2023-01-01 | End: 2023-01-01

## 2023-01-01 RX ORDER — INSULIN LISPRO 100 [IU]/ML
1-5 INJECTION, SOLUTION INTRAVENOUS; SUBCUTANEOUS
Status: ON HOLD
Start: 2023-01-01 | End: 2024-01-01

## 2023-01-01 RX ORDER — HYDRALAZINE HYDROCHLORIDE 10 MG/1
10 TABLET, FILM COATED ORAL EVERY 4 HOURS PRN
Status: DISCONTINUED | OUTPATIENT
Start: 2023-01-01 | End: 2023-01-01 | Stop reason: HOSPADM

## 2023-01-01 RX ORDER — TAMSULOSIN HYDROCHLORIDE 0.4 MG/1
0.4 CAPSULE ORAL EVERY MORNING
Status: ON HOLD | COMMUNITY
End: 2024-01-01

## 2023-01-01 RX ORDER — TRAMADOL HYDROCHLORIDE 100 MG/1
1 TABLET, EXTENDED RELEASE ORAL DAILY
Status: ON HOLD | COMMUNITY
End: 2023-01-01

## 2023-01-01 RX ORDER — LANOLIN ALCOHOL/MO/W.PET/CERES
3 CREAM (GRAM) TOPICAL
Status: DISCONTINUED | OUTPATIENT
Start: 2023-01-01 | End: 2023-01-01

## 2023-01-01 RX ORDER — LABETALOL HYDROCHLORIDE 5 MG/ML
10 INJECTION, SOLUTION INTRAVENOUS EVERY 4 HOURS PRN
Status: DISCONTINUED | OUTPATIENT
Start: 2023-01-01 | End: 2023-01-01 | Stop reason: HOSPADM

## 2023-01-01 RX ORDER — CEFTRIAXONE 1 G/1
1 INJECTION, POWDER, FOR SOLUTION INTRAMUSCULAR; INTRAVENOUS ONCE
Status: COMPLETED | OUTPATIENT
Start: 2023-01-01 | End: 2023-01-01

## 2023-01-01 RX ORDER — CEFTRIAXONE 1 G/1
1 INJECTION, POWDER, FOR SOLUTION INTRAMUSCULAR; INTRAVENOUS EVERY 24 HOURS
Qty: 30 ML | Refills: 0 | Status: DISCONTINUED | OUTPATIENT
Start: 2023-01-01 | End: 2023-01-01 | Stop reason: HOSPADM

## 2023-01-01 RX ORDER — AMOXICILLIN 250 MG
1 CAPSULE ORAL AT BEDTIME
Status: DISCONTINUED | OUTPATIENT
Start: 2023-01-01 | End: 2023-01-01 | Stop reason: HOSPADM

## 2023-01-01 RX ORDER — DEXTROSE MONOHYDRATE 100 MG/ML
INJECTION, SOLUTION INTRAVENOUS CONTINUOUS PRN
Status: DISCONTINUED | OUTPATIENT
Start: 2023-01-01 | End: 2023-01-01 | Stop reason: HOSPADM

## 2023-01-01 RX ORDER — INSULIN LISPRO 100 [IU]/ML
INJECTION, SOLUTION INTRAVENOUS; SUBCUTANEOUS
Status: ON HOLD
Start: 2023-01-01 | End: 2024-01-01

## 2023-01-01 RX ORDER — AMLODIPINE BESYLATE 5 MG/1
5 TABLET ORAL DAILY
Status: DISCONTINUED | OUTPATIENT
Start: 2023-01-01 | End: 2023-01-01

## 2023-01-01 RX ORDER — INSULIN LISPRO 100 [IU]/ML
INJECTION, SOLUTION INTRAVENOUS; SUBCUTANEOUS
Status: ON HOLD
Start: 2023-01-01 | End: 2023-01-01

## 2023-01-01 RX ORDER — AMLODIPINE BESYLATE 10 MG/1
10 TABLET ORAL DAILY
Status: DISCONTINUED | OUTPATIENT
Start: 2023-01-01 | End: 2024-01-01 | Stop reason: HOSPADM

## 2023-01-01 RX ORDER — IRBESARTAN 300 MG/1
300 TABLET ORAL AT BEDTIME
Qty: 90 TABLET | Refills: 3 | Status: ON HOLD | OUTPATIENT
Start: 2023-01-01 | End: 2024-01-01

## 2023-01-01 RX ORDER — ISOSORBIDE MONONITRATE 30 MG/1
60 TABLET, EXTENDED RELEASE ORAL EVERY EVENING
Status: DISCONTINUED | OUTPATIENT
Start: 2023-01-01 | End: 2023-01-01 | Stop reason: HOSPADM

## 2023-01-01 RX ORDER — ISOSORBIDE MONONITRATE 60 MG/1
60 TABLET, EXTENDED RELEASE ORAL EVERY EVENING
Status: DISCONTINUED | OUTPATIENT
Start: 2023-01-01 | End: 2023-01-01 | Stop reason: HOSPADM

## 2023-01-01 RX ORDER — INSULIN LISPRO 100 [IU]/ML
1 INJECTION, SOLUTION INTRAVENOUS; SUBCUTANEOUS
Status: ON HOLD
Start: 2023-01-01 | End: 2024-01-01

## 2023-01-01 RX ORDER — SACUBITRIL AND VALSARTAN 24; 26 MG/1; MG/1
1 TABLET, FILM COATED ORAL 2 TIMES DAILY
Qty: 90 TABLET | Refills: 3 | Status: SHIPPED | OUTPATIENT
Start: 2023-01-01 | End: 2023-01-01 | Stop reason: DRUGHIGH

## 2023-01-01 RX ORDER — PIPERACILLIN SODIUM, TAZOBACTAM SODIUM 3; .375 G/15ML; G/15ML
3.38 INJECTION, POWDER, LYOPHILIZED, FOR SOLUTION INTRAVENOUS EVERY 6 HOURS
Status: DISCONTINUED | OUTPATIENT
Start: 2023-01-01 | End: 2023-01-01

## 2023-01-01 RX ORDER — AMOXICILLIN 250 MG
2 CAPSULE ORAL 2 TIMES DAILY PRN
Status: DISCONTINUED | OUTPATIENT
Start: 2023-01-01 | End: 2023-01-01

## 2023-01-01 RX ORDER — HYDRALAZINE HYDROCHLORIDE 10 MG/1
10 TABLET, FILM COATED ORAL EVERY 4 HOURS PRN
Status: DISCONTINUED | OUTPATIENT
Start: 2023-01-01 | End: 2023-01-01

## 2023-01-01 RX ORDER — CEFTRIAXONE 1 G/1
1 INJECTION, POWDER, FOR SOLUTION INTRAMUSCULAR; INTRAVENOUS EVERY 24 HOURS
Status: DISCONTINUED | OUTPATIENT
Start: 2023-01-01 | End: 2023-01-01

## 2023-01-01 RX ORDER — IRBESARTAN 150 MG/1
150 TABLET ORAL AT BEDTIME
Status: DISCONTINUED | OUTPATIENT
Start: 2023-01-01 | End: 2023-01-01

## 2023-01-01 RX ORDER — ONDANSETRON 4 MG/1
4 TABLET, ORALLY DISINTEGRATING ORAL EVERY 6 HOURS PRN
Status: DISCONTINUED | OUTPATIENT
Start: 2023-01-01 | End: 2023-01-01 | Stop reason: HOSPADM

## 2023-01-01 RX ORDER — GADOBUTROL 604.72 MG/ML
6 INJECTION INTRAVENOUS ONCE
Status: COMPLETED | OUTPATIENT
Start: 2023-01-01 | End: 2023-01-01

## 2023-01-01 RX ORDER — TRAMADOL HYDROCHLORIDE 100 MG/1
100 TABLET, EXTENDED RELEASE ORAL DAILY PRN
Status: DISCONTINUED | OUTPATIENT
Start: 2023-01-01 | End: 2023-01-01

## 2023-01-01 RX ORDER — IRBESARTAN 150 MG/1
300 TABLET ORAL AT BEDTIME
Status: DISCONTINUED | OUTPATIENT
Start: 2023-01-01 | End: 2024-01-01 | Stop reason: HOSPADM

## 2023-01-01 RX ORDER — LABETALOL HYDROCHLORIDE 5 MG/ML
10 INJECTION, SOLUTION INTRAVENOUS EVERY 4 HOURS
Status: DISCONTINUED | OUTPATIENT
Start: 2023-01-01 | End: 2023-01-01

## 2023-01-01 RX ORDER — IRBESARTAN 150 MG/1
300 TABLET ORAL AT BEDTIME
Status: DISCONTINUED | OUTPATIENT
Start: 2023-01-01 | End: 2023-01-01

## 2023-01-01 RX ORDER — ISOSORBIDE MONONITRATE 60 MG/1
60 TABLET, EXTENDED RELEASE ORAL EVERY EVENING
Status: ON HOLD
Start: 2023-01-01 | End: 2024-01-01

## 2023-01-01 RX ORDER — TAMSULOSIN HYDROCHLORIDE 0.4 MG/1
0.4 CAPSULE ORAL EVERY MORNING
Status: DISCONTINUED | OUTPATIENT
Start: 2023-01-01 | End: 2024-01-01 | Stop reason: HOSPADM

## 2023-01-01 RX ORDER — ENOXAPARIN SODIUM 100 MG/ML
30 INJECTION SUBCUTANEOUS EVERY 24 HOURS
Status: ON HOLD
Start: 2023-01-01 | End: 2024-01-01

## 2023-01-01 RX ORDER — AMLODIPINE BESYLATE 10 MG/1
10 TABLET ORAL DAILY
Status: ON HOLD
Start: 2023-01-01 | End: 2024-01-01

## 2023-01-01 RX ORDER — CARVEDILOL 12.5 MG/1
12.5 TABLET ORAL 2 TIMES DAILY WITH MEALS
Status: ON HOLD
Start: 2023-01-01 | End: 2024-01-01

## 2023-01-01 RX ORDER — HYDROCODONE BITARTRATE AND ACETAMINOPHEN 5; 325 MG/1; MG/1
TABLET ORAL
Status: ON HOLD | COMMUNITY
Start: 2023-01-01 | End: 2023-01-01

## 2023-01-01 RX ORDER — INSULIN LISPRO 100 [IU]/ML
1-4 INJECTION, SOLUTION INTRAVENOUS; SUBCUTANEOUS
Status: ON HOLD
Start: 2023-01-01 | End: 2024-01-01

## 2023-01-01 RX ORDER — SODIUM CHLORIDE 9 MG/ML
INJECTION, SOLUTION INTRAVENOUS CONTINUOUS
Status: DISCONTINUED | OUTPATIENT
Start: 2023-01-01 | End: 2023-01-01 | Stop reason: HOSPADM

## 2023-01-01 RX ORDER — ISOSORBIDE MONONITRATE 60 MG/1
60 TABLET, EXTENDED RELEASE ORAL EVERY EVENING
Status: ON HOLD | DISCHARGE
Start: 2023-01-01 | End: 2023-01-01

## 2023-01-01 RX ORDER — CEFTRIAXONE 1 G/1
1 INJECTION, POWDER, FOR SOLUTION INTRAMUSCULAR; INTRAVENOUS EVERY 24 HOURS
Status: ON HOLD | DISCHARGE
Start: 2023-01-01 | End: 2024-01-01

## 2023-01-01 RX ORDER — SODIUM CHLORIDE 9 MG/ML
INJECTION, SOLUTION INTRAVENOUS CONTINUOUS
Status: DISCONTINUED | OUTPATIENT
Start: 2023-01-01 | End: 2023-01-01

## 2023-01-01 RX ORDER — PROCHLORPERAZINE MALEATE 5 MG
5 TABLET ORAL EVERY 6 HOURS PRN
Status: DISCONTINUED | OUTPATIENT
Start: 2023-01-01 | End: 2023-01-01 | Stop reason: HOSPADM

## 2023-01-01 RX ORDER — CALCIUM CARBONATE 500 MG/1
1000 TABLET, CHEWABLE ORAL 4 TIMES DAILY PRN
Status: DISCONTINUED | OUTPATIENT
Start: 2023-01-01 | End: 2023-01-01 | Stop reason: HOSPADM

## 2023-01-01 RX ORDER — AMOXICILLIN 250 MG
1-2 CAPSULE ORAL 2 TIMES DAILY PRN
Status: ON HOLD
Start: 2023-01-01 | End: 2024-01-01

## 2023-01-01 RX ORDER — NICOTINE POLACRILEX 4 MG
15-30 LOZENGE BUCCAL
Status: ON HOLD
Start: 2023-01-01 | End: 2024-01-01

## 2023-01-01 RX ADMIN — AMLODIPINE BESYLATE 10 MG: 10 TABLET ORAL at 08:52

## 2023-01-01 RX ADMIN — PIPERACILLIN AND TAZOBACTAM 3.38 G: 3; .375 INJECTION, POWDER, FOR SOLUTION INTRAVENOUS at 05:17

## 2023-01-01 RX ADMIN — IRBESARTAN 300 MG: 150 TABLET ORAL at 21:13

## 2023-01-01 RX ADMIN — CEFTRIAXONE SODIUM 1 G: 1 INJECTION, POWDER, FOR SOLUTION INTRAMUSCULAR; INTRAVENOUS at 08:17

## 2023-01-01 RX ADMIN — INSULIN ASPART 1 UNITS: 100 INJECTION, SOLUTION INTRAVENOUS; SUBCUTANEOUS at 13:14

## 2023-01-01 RX ADMIN — PROCHLORPERAZINE EDISYLATE 5 MG: 5 INJECTION INTRAMUSCULAR; INTRAVENOUS at 04:36

## 2023-01-01 RX ADMIN — AMLODIPINE BESYLATE 10 MG: 10 TABLET ORAL at 07:58

## 2023-01-01 RX ADMIN — PIPERACILLIN AND TAZOBACTAM 3.38 G: 3; .375 INJECTION, POWDER, FOR SOLUTION INTRAVENOUS at 16:50

## 2023-01-01 RX ADMIN — CARVEDILOL 25 MG: 25 TABLET, FILM COATED ORAL at 17:33

## 2023-01-01 RX ADMIN — CEFUROXIME AXETIL 500 MG: 500 TABLET ORAL at 21:13

## 2023-01-01 RX ADMIN — ENOXAPARIN SODIUM 30 MG: 30 INJECTION SUBCUTANEOUS at 12:03

## 2023-01-01 RX ADMIN — CARVEDILOL 12.5 MG: 12.5 TABLET, FILM COATED ORAL at 17:55

## 2023-01-01 RX ADMIN — CARVEDILOL 12.5 MG: 6.25 TABLET, FILM COATED ORAL at 08:17

## 2023-01-01 RX ADMIN — DOCUSATE SODIUM 50 MG AND SENNOSIDES 8.6 MG 1 TABLET: 8.6; 5 TABLET, FILM COATED ORAL at 21:08

## 2023-01-01 RX ADMIN — INSULIN ASPART 1 UNITS: 100 INJECTION, SOLUTION INTRAVENOUS; SUBCUTANEOUS at 18:35

## 2023-01-01 RX ADMIN — DEXTROSE MONOHYDRATE 25 ML: 25 INJECTION, SOLUTION INTRAVENOUS at 02:30

## 2023-01-01 RX ADMIN — ENOXAPARIN SODIUM 40 MG: 40 INJECTION SUBCUTANEOUS at 14:44

## 2023-01-01 RX ADMIN — INSULIN ASPART 4 UNITS: 100 INJECTION, SOLUTION INTRAVENOUS; SUBCUTANEOUS at 09:00

## 2023-01-01 RX ADMIN — INSULIN GLARGINE 9 UNITS: 100 INJECTION, SOLUTION SUBCUTANEOUS at 06:25

## 2023-01-01 RX ADMIN — CARVEDILOL 12.5 MG: 12.5 TABLET, FILM COATED ORAL at 07:58

## 2023-01-01 RX ADMIN — INSULIN GLARGINE 14 UNITS: 100 INJECTION, SOLUTION SUBCUTANEOUS at 00:27

## 2023-01-01 RX ADMIN — CLONIDINE HYDROCHLORIDE 0.1 MG: 0.1 TABLET ORAL at 10:46

## 2023-01-01 RX ADMIN — SODIUM CHLORIDE: 9 INJECTION, SOLUTION INTRAVENOUS at 10:53

## 2023-01-01 RX ADMIN — IRBESARTAN 300 MG: 150 TABLET ORAL at 21:07

## 2023-01-01 RX ADMIN — ISOSORBIDE MONONITRATE 60 MG: 60 TABLET, EXTENDED RELEASE ORAL at 21:22

## 2023-01-01 RX ADMIN — IRBESARTAN 300 MG: 150 TABLET ORAL at 20:18

## 2023-01-01 RX ADMIN — ENOXAPARIN SODIUM 30 MG: 30 INJECTION SUBCUTANEOUS at 13:42

## 2023-01-01 RX ADMIN — INSULIN HUMAN 5.5 UNITS/HR: 1 INJECTION, SOLUTION INTRAVENOUS at 09:32

## 2023-01-01 RX ADMIN — AMLODIPINE BESYLATE 10 MG: 10 TABLET ORAL at 08:31

## 2023-01-01 RX ADMIN — SODIUM CHLORIDE 1000 ML: 9 INJECTION, SOLUTION INTRAVENOUS at 11:48

## 2023-01-01 RX ADMIN — CEFTRIAXONE SODIUM 1 G: 1 INJECTION, POWDER, FOR SOLUTION INTRAMUSCULAR; INTRAVENOUS at 08:52

## 2023-01-01 RX ADMIN — PROCHLORPERAZINE EDISYLATE 5 MG: 5 INJECTION INTRAMUSCULAR; INTRAVENOUS at 04:32

## 2023-01-01 RX ADMIN — CARVEDILOL 12.5 MG: 12.5 TABLET, FILM COATED ORAL at 18:08

## 2023-01-01 RX ADMIN — MELATONIN 5 MG TABLET 10 MG: at 21:43

## 2023-01-01 RX ADMIN — AMLODIPINE BESYLATE 10 MG: 10 TABLET ORAL at 15:38

## 2023-01-01 RX ADMIN — NICARDIPINE HYDROCHLORIDE 5 MG/HR: 0.2 INJECTION INTRAVENOUS at 14:52

## 2023-01-01 RX ADMIN — IOPAMIDOL 67 ML: 755 INJECTION, SOLUTION INTRAVENOUS at 14:23

## 2023-01-01 RX ADMIN — TAMSULOSIN HYDROCHLORIDE 0.4 MG: 0.4 CAPSULE ORAL at 08:31

## 2023-01-01 RX ADMIN — CARVEDILOL 12.5 MG: 12.5 TABLET, FILM COATED ORAL at 08:03

## 2023-01-01 RX ADMIN — INSULIN HUMAN 12 UNITS/HR: 1 INJECTION, SOLUTION INTRAVENOUS at 10:27

## 2023-01-01 RX ADMIN — IRBESARTAN 300 MG: 150 TABLET ORAL at 22:01

## 2023-01-01 RX ADMIN — INSULIN GLARGINE 12 UNITS: 100 INJECTION, SOLUTION SUBCUTANEOUS at 08:28

## 2023-01-01 RX ADMIN — NICARDIPINE HYDROCHLORIDE 2.5 MG/HR: 0.2 INJECTION INTRAVENOUS at 00:34

## 2023-01-01 RX ADMIN — DEXTROSE MONOHYDRATE 25 ML: 25 INJECTION, SOLUTION INTRAVENOUS at 05:56

## 2023-01-01 RX ADMIN — CEFUROXIME AXETIL 500 MG: 500 TABLET ORAL at 08:38

## 2023-01-01 RX ADMIN — DOCUSATE SODIUM 50 MG AND SENNOSIDES 8.6 MG 1 TABLET: 8.6; 5 TABLET, FILM COATED ORAL at 21:46

## 2023-01-01 RX ADMIN — AMLODIPINE BESYLATE 5 MG: 5 TABLET ORAL at 10:41

## 2023-01-01 RX ADMIN — DOCUSATE SODIUM 50 MG AND SENNOSIDES 8.6 MG 1 TABLET: 8.6; 5 TABLET, FILM COATED ORAL at 21:06

## 2023-01-01 RX ADMIN — SODIUM CHLORIDE: 9 INJECTION, SOLUTION INTRAVENOUS at 17:03

## 2023-01-01 RX ADMIN — ONDANSETRON 4 MG: 2 INJECTION INTRAMUSCULAR; INTRAVENOUS at 14:54

## 2023-01-01 RX ADMIN — ONDANSETRON 4 MG: 2 INJECTION INTRAMUSCULAR; INTRAVENOUS at 02:27

## 2023-01-01 RX ADMIN — MELATONIN 5 MG TABLET 10 MG: at 21:24

## 2023-01-01 RX ADMIN — PIPERACILLIN AND TAZOBACTAM 3.38 G: 3; .375 INJECTION, POWDER, FOR SOLUTION INTRAVENOUS at 17:44

## 2023-01-01 RX ADMIN — PIPERACILLIN AND TAZOBACTAM 3.38 G: 3; .375 INJECTION, POWDER, FOR SOLUTION INTRAVENOUS at 00:05

## 2023-01-01 RX ADMIN — PIPERACILLIN AND TAZOBACTAM 3.38 G: 3; .375 INJECTION, POWDER, FOR SOLUTION INTRAVENOUS at 05:43

## 2023-01-01 RX ADMIN — INSULIN ASPART 3 UNITS: 100 INJECTION, SOLUTION INTRAVENOUS; SUBCUTANEOUS at 07:46

## 2023-01-01 RX ADMIN — CARVEDILOL 25 MG: 25 TABLET, FILM COATED ORAL at 18:09

## 2023-01-01 RX ADMIN — ENOXAPARIN SODIUM 30 MG: 30 INJECTION SUBCUTANEOUS at 14:19

## 2023-01-01 RX ADMIN — SODIUM CHLORIDE 500 ML: 9 INJECTION, SOLUTION INTRAVENOUS at 13:09

## 2023-01-01 RX ADMIN — ENOXAPARIN SODIUM 30 MG: 30 INJECTION SUBCUTANEOUS at 14:09

## 2023-01-01 RX ADMIN — SODIUM CHLORIDE 500 ML: 9 INJECTION, SOLUTION INTRAVENOUS at 17:46

## 2023-01-01 RX ADMIN — ENOXAPARIN SODIUM 40 MG: 40 INJECTION SUBCUTANEOUS at 14:51

## 2023-01-01 RX ADMIN — CARVEDILOL 12.5 MG: 6.25 TABLET, FILM COATED ORAL at 08:28

## 2023-01-01 RX ADMIN — IRBESARTAN 300 MG: 150 TABLET ORAL at 21:48

## 2023-01-01 RX ADMIN — ISOSORBIDE MONONITRATE 60 MG: 60 TABLET, EXTENDED RELEASE ORAL at 21:54

## 2023-01-01 RX ADMIN — DEXTROSE AND SODIUM CHLORIDE: 5; 900 INJECTION, SOLUTION INTRAVENOUS at 15:19

## 2023-01-01 RX ADMIN — ONDANSETRON 4 MG: 2 INJECTION INTRAMUSCULAR; INTRAVENOUS at 07:53

## 2023-01-01 RX ADMIN — CEFTRIAXONE SODIUM 1 G: 1 INJECTION, POWDER, FOR SOLUTION INTRAMUSCULAR; INTRAVENOUS at 08:50

## 2023-01-01 RX ADMIN — IRBESARTAN 300 MG: 150 TABLET ORAL at 22:28

## 2023-01-01 RX ADMIN — PROTHROMBIN, COAGULATION FACTOR VII HUMAN, COAGULATION FACTOR IX HUMAN, COAGULATION FACTOR X HUMAN, PROTEIN C, PROTEIN S HUMAN, AND WATER 3125 UNITS: KIT at 15:19

## 2023-01-01 RX ADMIN — ONDANSETRON 4 MG: 4 TABLET, ORALLY DISINTEGRATING ORAL at 11:41

## 2023-01-01 RX ADMIN — INSULIN ASPART 3 UNITS: 100 INJECTION, SOLUTION INTRAVENOUS; SUBCUTANEOUS at 13:34

## 2023-01-01 RX ADMIN — MELATONIN 5 MG TABLET 10 MG: at 21:55

## 2023-01-01 RX ADMIN — INSULIN GLARGINE 12 UNITS: 100 INJECTION, SOLUTION SUBCUTANEOUS at 07:46

## 2023-01-01 RX ADMIN — ENOXAPARIN SODIUM 40 MG: 40 INJECTION SUBCUTANEOUS at 14:17

## 2023-01-01 RX ADMIN — CEFTRIAXONE SODIUM 1 G: 1 INJECTION, POWDER, FOR SOLUTION INTRAMUSCULAR; INTRAVENOUS at 09:23

## 2023-01-01 RX ADMIN — INSULIN ASPART 3 UNITS: 100 INJECTION, SOLUTION INTRAVENOUS; SUBCUTANEOUS at 00:25

## 2023-01-01 RX ADMIN — IRBESARTAN 300 MG: 150 TABLET ORAL at 21:43

## 2023-01-01 RX ADMIN — AMLODIPINE BESYLATE 10 MG: 10 TABLET ORAL at 08:48

## 2023-01-01 RX ADMIN — MELATONIN 5 MG TABLET 10 MG: at 01:16

## 2023-01-01 RX ADMIN — DIPHENHYDRAMINE HYDROCHLORIDE 25 MG: 50 INJECTION, SOLUTION INTRAMUSCULAR; INTRAVENOUS at 14:48

## 2023-01-01 RX ADMIN — HYDRALAZINE HYDROCHLORIDE 10 MG: 20 INJECTION INTRAMUSCULAR; INTRAVENOUS at 01:22

## 2023-01-01 RX ADMIN — CARVEDILOL 12.5 MG: 12.5 TABLET, FILM COATED ORAL at 18:04

## 2023-01-01 RX ADMIN — INSULIN ASPART: 100 INJECTION, SOLUTION INTRAVENOUS; SUBCUTANEOUS at 08:43

## 2023-01-01 RX ADMIN — AMLODIPINE BESYLATE 10 MG: 10 TABLET ORAL at 08:38

## 2023-01-01 RX ADMIN — MELATONIN 5 MG TABLET 10 MG: at 21:48

## 2023-01-01 RX ADMIN — ONDANSETRON 4 MG: 2 INJECTION INTRAMUSCULAR; INTRAVENOUS at 19:58

## 2023-01-01 RX ADMIN — CEFTRIAXONE SODIUM 1 G: 1 INJECTION, POWDER, FOR SOLUTION INTRAMUSCULAR; INTRAVENOUS at 08:18

## 2023-01-01 RX ADMIN — CARVEDILOL 25 MG: 25 TABLET, FILM COATED ORAL at 08:49

## 2023-01-01 RX ADMIN — INSULIN ASPART 2 UNITS: 100 INJECTION, SOLUTION INTRAVENOUS; SUBCUTANEOUS at 15:36

## 2023-01-01 RX ADMIN — ENOXAPARIN SODIUM 40 MG: 40 INJECTION SUBCUTANEOUS at 14:04

## 2023-01-01 RX ADMIN — ONDANSETRON 4 MG: 4 TABLET, ORALLY DISINTEGRATING ORAL at 14:44

## 2023-01-01 RX ADMIN — GADOBUTROL 6 ML: 604.72 INJECTION INTRAVENOUS at 16:22

## 2023-01-01 RX ADMIN — DOCUSATE SODIUM 50 MG AND SENNOSIDES 8.6 MG 1 TABLET: 8.6; 5 TABLET, FILM COATED ORAL at 20:20

## 2023-01-01 RX ADMIN — ENOXAPARIN SODIUM 40 MG: 40 INJECTION SUBCUTANEOUS at 13:36

## 2023-01-01 RX ADMIN — CARVEDILOL 12.5 MG: 6.25 TABLET, FILM COATED ORAL at 08:23

## 2023-01-01 RX ADMIN — CEFTRIAXONE SODIUM 1 G: 1 INJECTION, POWDER, FOR SOLUTION INTRAMUSCULAR; INTRAVENOUS at 08:27

## 2023-01-01 RX ADMIN — AMLODIPINE BESYLATE 10 MG: 10 TABLET ORAL at 08:51

## 2023-01-01 RX ADMIN — CARVEDILOL 12.5 MG: 6.25 TABLET, FILM COATED ORAL at 17:59

## 2023-01-01 RX ADMIN — ENOXAPARIN SODIUM 30 MG: 30 INJECTION SUBCUTANEOUS at 15:39

## 2023-01-01 RX ADMIN — ISOSORBIDE MONONITRATE 60 MG: 30 TABLET, EXTENDED RELEASE ORAL at 21:48

## 2023-01-01 RX ADMIN — DEXTROSE MONOHYDRATE 50 ML: 25 INJECTION, SOLUTION INTRAVENOUS at 19:57

## 2023-01-01 RX ADMIN — AMLODIPINE BESYLATE 10 MG: 10 TABLET ORAL at 08:49

## 2023-01-01 RX ADMIN — CARVEDILOL 12.5 MG: 12.5 TABLET, FILM COATED ORAL at 08:48

## 2023-01-01 RX ADMIN — DEXTROSE MONOHYDRATE 25 ML: 25 INJECTION, SOLUTION INTRAVENOUS at 04:12

## 2023-01-01 RX ADMIN — CARVEDILOL 12.5 MG: 12.5 TABLET, FILM COATED ORAL at 08:51

## 2023-01-01 RX ADMIN — CARVEDILOL 25 MG: 25 TABLET, FILM COATED ORAL at 09:14

## 2023-01-01 RX ADMIN — CARVEDILOL 12.5 MG: 12.5 TABLET, FILM COATED ORAL at 17:03

## 2023-01-01 RX ADMIN — ENOXAPARIN SODIUM 30 MG: 30 INJECTION SUBCUTANEOUS at 14:13

## 2023-01-01 RX ADMIN — CEFUROXIME AXETIL 500 MG: 500 TABLET ORAL at 20:59

## 2023-01-01 RX ADMIN — INSULIN ASPART 6 UNITS: 100 INJECTION, SOLUTION INTRAVENOUS; SUBCUTANEOUS at 02:04

## 2023-01-01 RX ADMIN — SODIUM CHLORIDE 1000 ML: 9 INJECTION, SOLUTION INTRAVENOUS at 11:12

## 2023-01-01 RX ADMIN — Medication 5 MG: at 22:02

## 2023-01-01 RX ADMIN — SODIUM CHLORIDE 100 ML: 9 INJECTION, SOLUTION INTRAVENOUS at 14:23

## 2023-01-01 RX ADMIN — CEFUROXIME AXETIL 500 MG: 500 TABLET ORAL at 08:49

## 2023-01-01 RX ADMIN — PIPERACILLIN AND TAZOBACTAM 3.38 G: 3; .375 INJECTION, POWDER, FOR SOLUTION INTRAVENOUS at 12:01

## 2023-01-01 RX ADMIN — PROCHLORPERAZINE EDISYLATE 5 MG: 5 INJECTION INTRAMUSCULAR; INTRAVENOUS at 04:57

## 2023-01-01 RX ADMIN — SODIUM CHLORIDE 1000 ML: 9 INJECTION, SOLUTION INTRAVENOUS at 15:11

## 2023-01-01 RX ADMIN — INSULIN ASPART 4 UNITS: 100 INJECTION, SOLUTION INTRAVENOUS; SUBCUTANEOUS at 20:31

## 2023-01-01 RX ADMIN — SODIUM CHLORIDE 10 UNITS: 9 INJECTION, SOLUTION INTRAVENOUS at 19:05

## 2023-01-01 RX ADMIN — IRBESARTAN 300 MG: 150 TABLET ORAL at 20:22

## 2023-01-01 RX ADMIN — CEFUROXIME AXETIL 500 MG: 500 TABLET ORAL at 08:27

## 2023-01-01 RX ADMIN — ENOXAPARIN SODIUM 30 MG: 30 INJECTION SUBCUTANEOUS at 14:57

## 2023-01-01 RX ADMIN — CARVEDILOL 12.5 MG: 12.5 TABLET, FILM COATED ORAL at 08:52

## 2023-01-01 RX ADMIN — IRBESARTAN 300 MG: 150 TABLET ORAL at 21:21

## 2023-01-01 RX ADMIN — IRBESARTAN 300 MG: 150 TABLET ORAL at 21:18

## 2023-01-01 RX ADMIN — CEFTRIAXONE SODIUM 1 G: 1 INJECTION, POWDER, FOR SOLUTION INTRAMUSCULAR; INTRAVENOUS at 09:03

## 2023-01-01 RX ADMIN — TAMSULOSIN HYDROCHLORIDE 0.4 MG: 0.4 CAPSULE ORAL at 07:58

## 2023-01-01 RX ADMIN — DEXTROSE 15 G: 15 GEL ORAL at 17:16

## 2023-01-01 RX ADMIN — MELATONIN 5 MG TABLET 10 MG: at 21:08

## 2023-01-01 RX ADMIN — CARVEDILOL 12.5 MG: 12.5 TABLET, FILM COATED ORAL at 19:16

## 2023-01-01 RX ADMIN — ENOXAPARIN SODIUM 40 MG: 40 INJECTION SUBCUTANEOUS at 14:00

## 2023-01-01 RX ADMIN — HYDRALAZINE HYDROCHLORIDE 10 MG: 10 TABLET, FILM COATED ORAL at 03:11

## 2023-01-01 RX ADMIN — MELATONIN 5 MG TABLET 10 MG: at 22:46

## 2023-01-01 RX ADMIN — LABETALOL HYDROCHLORIDE 10 MG: 5 INJECTION, SOLUTION INTRAVENOUS at 04:36

## 2023-01-01 RX ADMIN — PIPERACILLIN AND TAZOBACTAM 3.38 G: 3; .375 INJECTION, POWDER, FOR SOLUTION INTRAVENOUS at 17:48

## 2023-01-01 RX ADMIN — INSULIN GLARGINE 12 UNITS: 100 INJECTION, SOLUTION SUBCUTANEOUS at 10:41

## 2023-01-01 RX ADMIN — DEXTROSE 15 G: 15 GEL ORAL at 05:22

## 2023-01-01 RX ADMIN — ENOXAPARIN SODIUM 30 MG: 30 INJECTION SUBCUTANEOUS at 14:38

## 2023-01-01 RX ADMIN — INSULIN ASPART 4 UNITS: 100 INJECTION, SOLUTION INTRAVENOUS; SUBCUTANEOUS at 18:26

## 2023-01-01 RX ADMIN — CARVEDILOL 12.5 MG: 12.5 TABLET, FILM COATED ORAL at 08:35

## 2023-01-01 RX ADMIN — INSULIN HUMAN: 1 INJECTION, SOLUTION INTRAVENOUS at 15:52

## 2023-01-01 RX ADMIN — CEFUROXIME AXETIL 500 MG: 500 TABLET ORAL at 19:44

## 2023-01-01 RX ADMIN — CARVEDILOL 12.5 MG: 12.5 TABLET, FILM COATED ORAL at 08:10

## 2023-01-01 RX ADMIN — DEXTROSE AND SODIUM CHLORIDE: 5; 450 INJECTION, SOLUTION INTRAVENOUS at 17:14

## 2023-01-01 RX ADMIN — VANCOMYCIN HYDROCHLORIDE 750 MG: 1 INJECTION, POWDER, LYOPHILIZED, FOR SOLUTION INTRAVENOUS at 15:00

## 2023-01-01 RX ADMIN — ISOSORBIDE MONONITRATE 60 MG: 30 TABLET, EXTENDED RELEASE ORAL at 21:24

## 2023-01-01 RX ADMIN — INSULIN ASPART: 100 INJECTION, SOLUTION INTRAVENOUS; SUBCUTANEOUS at 11:44

## 2023-01-01 RX ADMIN — ENOXAPARIN SODIUM 40 MG: 40 INJECTION SUBCUTANEOUS at 14:58

## 2023-01-01 RX ADMIN — HYDRALAZINE HYDROCHLORIDE 10 MG: 10 TABLET ORAL at 20:21

## 2023-01-01 RX ADMIN — DEXTROSE 30 G: 15 GEL ORAL at 16:53

## 2023-01-01 RX ADMIN — LABETALOL HYDROCHLORIDE 10 MG: 5 INJECTION, SOLUTION INTRAVENOUS at 05:36

## 2023-01-01 RX ADMIN — PANTOPRAZOLE SODIUM 40 MG: 40 INJECTION, POWDER, FOR SOLUTION INTRAVENOUS at 08:16

## 2023-01-01 RX ADMIN — LABETALOL HYDROCHLORIDE 10 MG: 5 INJECTION, SOLUTION INTRAVENOUS at 19:03

## 2023-01-01 RX ADMIN — DOCUSATE SODIUM 50 MG AND SENNOSIDES 8.6 MG 1 TABLET: 8.6; 5 TABLET, FILM COATED ORAL at 21:14

## 2023-01-01 RX ADMIN — AMLODIPINE BESYLATE 5 MG: 5 TABLET ORAL at 08:28

## 2023-01-01 RX ADMIN — IRBESARTAN 300 MG: 150 TABLET ORAL at 21:06

## 2023-01-01 RX ADMIN — CEFTRIAXONE SODIUM 1 G: 1 INJECTION, POWDER, FOR SOLUTION INTRAMUSCULAR; INTRAVENOUS at 09:04

## 2023-01-01 RX ADMIN — AMLODIPINE BESYLATE 10 MG: 10 TABLET ORAL at 08:17

## 2023-01-01 RX ADMIN — INSULIN ASPART 2 UNITS: 100 INJECTION, SOLUTION INTRAVENOUS; SUBCUTANEOUS at 12:43

## 2023-01-01 RX ADMIN — ENOXAPARIN SODIUM 30 MG: 30 INJECTION SUBCUTANEOUS at 16:25

## 2023-01-01 RX ADMIN — IRBESARTAN 300 MG: 150 TABLET ORAL at 21:30

## 2023-01-01 RX ADMIN — NICARDIPINE HYDROCHLORIDE 2.5 MG/HR: 0.2 INJECTION INTRAVENOUS at 06:36

## 2023-01-01 RX ADMIN — ISOSORBIDE MONONITRATE 60 MG: 60 TABLET, EXTENDED RELEASE ORAL at 21:06

## 2023-01-01 RX ADMIN — ENOXAPARIN SODIUM 30 MG: 30 INJECTION SUBCUTANEOUS at 14:03

## 2023-01-01 RX ADMIN — CARVEDILOL 12.5 MG: 12.5 TABLET, FILM COATED ORAL at 08:31

## 2023-01-01 RX ADMIN — GLIMEPIRIDE 2 MG: 2 TABLET ORAL at 09:51

## 2023-01-01 RX ADMIN — MELATONIN 5 MG TABLET 10 MG: at 21:06

## 2023-01-01 RX ADMIN — AMLODIPINE BESYLATE 10 MG: 10 TABLET ORAL at 08:27

## 2023-01-01 RX ADMIN — PANTOPRAZOLE SODIUM 40 MG: 40 INJECTION, POWDER, FOR SOLUTION INTRAVENOUS at 18:25

## 2023-01-01 RX ADMIN — DOCUSATE SODIUM 50 MG AND SENNOSIDES 8.6 MG 1 TABLET: 8.6; 5 TABLET, FILM COATED ORAL at 21:13

## 2023-01-01 RX ADMIN — INSULIN ASPART 2 UNITS: 100 INJECTION, SOLUTION INTRAVENOUS; SUBCUTANEOUS at 04:24

## 2023-01-01 RX ADMIN — ONDANSETRON 4 MG: 2 INJECTION INTRAMUSCULAR; INTRAVENOUS at 07:25

## 2023-01-01 RX ADMIN — INSULIN ASPART 2 UNITS: 100 INJECTION, SOLUTION INTRAVENOUS; SUBCUTANEOUS at 12:36

## 2023-01-01 RX ADMIN — AMLODIPINE BESYLATE 10 MG: 10 TABLET ORAL at 08:35

## 2023-01-01 RX ADMIN — INSULIN ASPART 4 UNITS: 100 INJECTION, SOLUTION INTRAVENOUS; SUBCUTANEOUS at 08:41

## 2023-01-01 RX ADMIN — CARVEDILOL 25 MG: 25 TABLET, FILM COATED ORAL at 08:38

## 2023-01-01 RX ADMIN — AMLODIPINE BESYLATE 10 MG: 10 TABLET ORAL at 09:14

## 2023-01-01 RX ADMIN — PIPERACILLIN AND TAZOBACTAM 3.38 G: 3; .375 INJECTION, POWDER, FOR SOLUTION INTRAVENOUS at 22:10

## 2023-01-01 RX ADMIN — SODIUM CHLORIDE 1000 ML: 9 INJECTION, SOLUTION INTRAVENOUS at 08:48

## 2023-01-01 RX ADMIN — ISOSORBIDE MONONITRATE 60 MG: 60 TABLET, EXTENDED RELEASE ORAL at 21:18

## 2023-01-01 RX ADMIN — CARVEDILOL 25 MG: 25 TABLET, FILM COATED ORAL at 18:10

## 2023-01-01 RX ADMIN — AMLODIPINE BESYLATE 10 MG: 10 TABLET ORAL at 08:03

## 2023-01-01 RX ADMIN — ONDANSETRON 4 MG: 2 INJECTION INTRAMUSCULAR; INTRAVENOUS at 00:29

## 2023-01-01 RX ADMIN — ISOSORBIDE MONONITRATE 60 MG: 30 TABLET, EXTENDED RELEASE ORAL at 21:43

## 2023-01-01 RX ADMIN — TRAZODONE HYDROCHLORIDE 75 MG: 50 TABLET ORAL at 21:38

## 2023-01-01 RX ADMIN — TAMSULOSIN HYDROCHLORIDE 0.4 MG: 0.4 CAPSULE ORAL at 08:52

## 2023-01-01 RX ADMIN — ISOSORBIDE MONONITRATE 60 MG: 30 TABLET, EXTENDED RELEASE ORAL at 20:50

## 2023-01-01 RX ADMIN — CARVEDILOL 12.5 MG: 12.5 TABLET, FILM COATED ORAL at 20:49

## 2023-01-01 RX ADMIN — CLONIDINE HYDROCHLORIDE 0.1 MG: 0.1 TABLET ORAL at 20:42

## 2023-01-01 RX ADMIN — Medication 10 MG: at 22:27

## 2023-01-01 RX ADMIN — INSULIN ASPART 5 UNITS: 100 INJECTION, SOLUTION INTRAVENOUS; SUBCUTANEOUS at 08:14

## 2023-01-01 RX ADMIN — IRBESARTAN 300 MG: 150 TABLET ORAL at 21:23

## 2023-01-01 RX ADMIN — VANCOMYCIN HYDROCHLORIDE 1250 MG: 10 INJECTION, POWDER, LYOPHILIZED, FOR SOLUTION INTRAVENOUS at 15:52

## 2023-01-01 RX ADMIN — CEFUROXIME AXETIL 500 MG: 500 TABLET ORAL at 20:04

## 2023-01-01 RX ADMIN — IRBESARTAN 300 MG: 150 TABLET ORAL at 21:46

## 2023-01-01 RX ADMIN — MELATONIN TAB 3 MG 3 MG: 3 TAB at 21:32

## 2023-01-01 RX ADMIN — PIPERACILLIN AND TAZOBACTAM 3.38 G: 3; .375 INJECTION, POWDER, FOR SOLUTION INTRAVENOUS at 11:31

## 2023-01-01 RX ADMIN — BARIUM SULFATE 10 ML: 400 SUSPENSION ORAL at 11:17

## 2023-01-01 RX ADMIN — CARVEDILOL 25 MG: 25 TABLET, FILM COATED ORAL at 17:50

## 2023-01-01 RX ADMIN — HYDRALAZINE HYDROCHLORIDE 10 MG: 10 TABLET ORAL at 11:47

## 2023-01-01 RX ADMIN — MELATONIN 5 MG TABLET 10 MG: at 21:13

## 2023-01-01 RX ADMIN — PIPERACILLIN AND TAZOBACTAM 3.38 G: 3; .375 INJECTION, POWDER, FOR SOLUTION INTRAVENOUS at 22:46

## 2023-01-01 RX ADMIN — INSULIN ASPART 3 UNITS: 100 INJECTION, SOLUTION INTRAVENOUS; SUBCUTANEOUS at 08:16

## 2023-01-01 RX ADMIN — INSULIN HUMAN 1.5 UNITS/HR: 1 INJECTION, SOLUTION INTRAVENOUS at 11:49

## 2023-01-01 RX ADMIN — HYDRALAZINE HYDROCHLORIDE 10 MG: 20 INJECTION INTRAMUSCULAR; INTRAVENOUS at 05:48

## 2023-01-01 RX ADMIN — MELATONIN 5 MG TABLET 10 MG: at 21:14

## 2023-01-01 RX ADMIN — IRBESARTAN 300 MG: 150 TABLET ORAL at 21:14

## 2023-01-01 RX ADMIN — IRBESARTAN 300 MG: 150 TABLET ORAL at 22:02

## 2023-01-01 RX ADMIN — PROCHLORPERAZINE EDISYLATE 5 MG: 5 INJECTION INTRAMUSCULAR; INTRAVENOUS at 14:47

## 2023-01-01 RX ADMIN — AMLODIPINE BESYLATE 5 MG: 5 TABLET ORAL at 11:24

## 2023-01-01 RX ADMIN — CARVEDILOL 12.5 MG: 12.5 TABLET, FILM COATED ORAL at 17:48

## 2023-01-01 RX ADMIN — CARVEDILOL 12.5 MG: 12.5 TABLET, FILM COATED ORAL at 18:49

## 2023-01-01 RX ADMIN — CARVEDILOL 25 MG: 25 TABLET, FILM COATED ORAL at 08:27

## 2023-01-01 RX ADMIN — CARVEDILOL 12.5 MG: 12.5 TABLET, FILM COATED ORAL at 17:27

## 2023-01-01 RX ADMIN — INSULIN ASPART 2 UNITS: 100 INJECTION, SOLUTION INTRAVENOUS; SUBCUTANEOUS at 18:10

## 2023-01-01 RX ADMIN — METFORMIN HYDROCHLORIDE 500 MG: 500 TABLET, FILM COATED ORAL at 08:31

## 2023-01-01 RX ADMIN — CEFUROXIME AXETIL 500 MG: 500 TABLET ORAL at 08:48

## 2023-01-01 RX ADMIN — PROCHLORPERAZINE EDISYLATE 5 MG: 5 INJECTION INTRAMUSCULAR; INTRAVENOUS at 21:22

## 2023-01-01 RX ADMIN — DOCUSATE SODIUM 50 MG AND SENNOSIDES 8.6 MG 1 TABLET: 8.6; 5 TABLET, FILM COATED ORAL at 21:43

## 2023-01-01 RX ADMIN — CARVEDILOL 12.5 MG: 6.25 TABLET, FILM COATED ORAL at 17:36

## 2023-01-01 RX ADMIN — SODIUM CHLORIDE: 4.5 INJECTION, SOLUTION INTRAVENOUS at 10:59

## 2023-01-01 RX ADMIN — ISOSORBIDE MONONITRATE 60 MG: 30 TABLET, EXTENDED RELEASE ORAL at 20:59

## 2023-01-01 RX ADMIN — AMLODIPINE BESYLATE 10 MG: 10 TABLET ORAL at 08:10

## 2023-01-01 RX ADMIN — METOCLOPRAMIDE 5 MG: 5 INJECTION, SOLUTION INTRAMUSCULAR; INTRAVENOUS at 15:08

## 2023-01-01 RX ADMIN — CEFTRIAXONE SODIUM 2 G: 2 INJECTION, POWDER, FOR SOLUTION INTRAMUSCULAR; INTRAVENOUS at 08:23

## 2023-01-01 RX ADMIN — VANCOMYCIN HYDROCHLORIDE 750 MG: 1 INJECTION, POWDER, LYOPHILIZED, FOR SOLUTION INTRAVENOUS at 16:12

## 2023-01-01 RX ADMIN — CARVEDILOL 12.5 MG: 6.25 TABLET, FILM COATED ORAL at 17:07

## 2023-01-01 RX ADMIN — LABETALOL HYDROCHLORIDE 10 MG: 5 INJECTION, SOLUTION INTRAVENOUS at 01:05

## 2023-01-01 RX ADMIN — DEXTROSE MONOHYDRATE 25 ML: 25 INJECTION, SOLUTION INTRAVENOUS at 02:55

## 2023-01-01 RX ADMIN — IRBESARTAN 300 MG: 150 TABLET ORAL at 21:24

## 2023-01-01 ASSESSMENT — ACTIVITIES OF DAILY LIVING (ADL)
ADLS_ACUITY_SCORE: 33
ADLS_ACUITY_SCORE: 40
ADLS_ACUITY_SCORE: 38
ADLS_ACUITY_SCORE: 36
ADLS_ACUITY_SCORE: 33
ADLS_ACUITY_SCORE: 40
ADLS_ACUITY_SCORE: 40
ADLS_ACUITY_SCORE: 31
ADLS_ACUITY_SCORE: 37
ADLS_ACUITY_SCORE: 36
ADLS_ACUITY_SCORE: 26
HEARING_DIFFICULTY_OR_DEAF: NO
ADLS_ACUITY_SCORE: 36
ADLS_ACUITY_SCORE: 34
ADLS_ACUITY_SCORE: 36
ADLS_ACUITY_SCORE: 35
ADLS_ACUITY_SCORE: 38
ADLS_ACUITY_SCORE: 35
ADLS_ACUITY_SCORE: 35
ADLS_ACUITY_SCORE: 40
ADLS_ACUITY_SCORE: 37
ADLS_ACUITY_SCORE: 36
ADLS_ACUITY_SCORE: 36
ADLS_ACUITY_SCORE: 34
ADLS_ACUITY_SCORE: 36
ADLS_ACUITY_SCORE: 34
ADLS_ACUITY_SCORE: 36
ADLS_ACUITY_SCORE: 36
ADLS_ACUITY_SCORE: 38
ADLS_ACUITY_SCORE: 34
ADLS_ACUITY_SCORE: 36
ADLS_ACUITY_SCORE: 36
ADLS_ACUITY_SCORE: 35
ADLS_ACUITY_SCORE: 33
ADLS_ACUITY_SCORE: 41
ADLS_ACUITY_SCORE: 36
ADLS_ACUITY_SCORE: 43
ADLS_ACUITY_SCORE: 40
ADLS_ACUITY_SCORE: 36
ADLS_ACUITY_SCORE: 34
ADLS_ACUITY_SCORE: 36
DIFFICULTY_COMMUNICATING: NO
ADLS_ACUITY_SCORE: 34
ADLS_ACUITY_SCORE: 40
ADLS_ACUITY_SCORE: 36
ADLS_ACUITY_SCORE: 38
ADLS_ACUITY_SCORE: 33
ADLS_ACUITY_SCORE: 36
ADLS_ACUITY_SCORE: 40
ADLS_ACUITY_SCORE: 35
ADLS_ACUITY_SCORE: 36
ADLS_ACUITY_SCORE: 36
ADLS_ACUITY_SCORE: 41
ADLS_ACUITY_SCORE: 40
ADLS_ACUITY_SCORE: 36
ADLS_ACUITY_SCORE: 36
ADLS_ACUITY_SCORE: 40
ADLS_ACUITY_SCORE: 35
ADLS_ACUITY_SCORE: 40
ADLS_ACUITY_SCORE: 34
ADLS_ACUITY_SCORE: 33
ADLS_ACUITY_SCORE: 38
ADLS_ACUITY_SCORE: 35
ADLS_ACUITY_SCORE: 34
WALKING_OR_CLIMBING_STAIRS_DIFFICULTY: YES
ADLS_ACUITY_SCORE: 33
ADLS_ACUITY_SCORE: 33
BADLS,_PREVIOUS_FUNCTIONAL_LEVEL: INDEPENDENT
ADLS_ACUITY_SCORE: 36
ADLS_ACUITY_SCORE: 38
ADLS_ACUITY_SCORE: 40
ADLS_ACUITY_SCORE: 37
WALKING_OR_CLIMBING_STAIRS: AMBULATION DIFFICULTY, ASSISTANCE 1 PERSON
ADLS_ACUITY_SCORE: 38
ADLS_ACUITY_SCORE: 37
ADLS_ACUITY_SCORE: 33
ADLS_ACUITY_SCORE: 33
FALL_HISTORY_WITHIN_LAST_SIX_MONTHS: NO
ADLS_ACUITY_SCORE: 40
ADLS_ACUITY_SCORE: 34
ADLS_ACUITY_SCORE: 38
ADLS_ACUITY_SCORE: 36
ADLS_ACUITY_SCORE: 37
DEPENDENT_IADLS:: INDEPENDENT
ADLS_ACUITY_SCORE: 36
ADLS_ACUITY_SCORE: 33
ADLS_ACUITY_SCORE: 38
ADLS_ACUITY_SCORE: 37
ADLS_ACUITY_SCORE: 38
ADLS_ACUITY_SCORE: 34
ADLS_ACUITY_SCORE: 36
ADLS_ACUITY_SCORE: 38
DEPENDENT_IADLS:: INDEPENDENT
ADLS_ACUITY_SCORE: 40
ADLS_ACUITY_SCORE: 38
ADLS_ACUITY_SCORE: 34
DOING_ERRANDS_INDEPENDENTLY_DIFFICULTY: NO
ADLS_ACUITY_SCORE: 31
ADLS_ACUITY_SCORE: 36
ADLS_ACUITY_SCORE: 33
ADLS_ACUITY_SCORE: 40
ADLS_ACUITY_SCORE: 38
TOILETING_ASSISTANCE: TOILETING DIFFICULTY, DEPENDENT
ADLS_ACUITY_SCORE: 40
ADLS_ACUITY_SCORE: 31
ADLS_ACUITY_SCORE: 34
ADLS_ACUITY_SCORE: 36
ADLS_ACUITY_SCORE: 36
ADLS_ACUITY_SCORE: 40
EQUIPMENT_CURRENTLY_USED_AT_HOME: GRAB BAR, TOILET
ADLS_ACUITY_SCORE: 40
ADLS_ACUITY_SCORE: 37
ADLS_ACUITY_SCORE: 37
ADLS_ACUITY_SCORE: 38
ADLS_ACUITY_SCORE: 38
ADLS_ACUITY_SCORE: 37
ADLS_ACUITY_SCORE: 36
ADLS_ACUITY_SCORE: 37
ADLS_ACUITY_SCORE: 35
ADLS_ACUITY_SCORE: 40
ADLS_ACUITY_SCORE: 34
ADLS_ACUITY_SCORE: 39
ADLS_ACUITY_SCORE: 36
ADLS_ACUITY_SCORE: 38
ADLS_ACUITY_SCORE: 36
ADLS_ACUITY_SCORE: 34
ADLS_ACUITY_SCORE: 38
ADLS_ACUITY_SCORE: 35
ADLS_ACUITY_SCORE: 35
ADLS_ACUITY_SCORE: 38
ADLS_ACUITY_SCORE: 35
ADLS_ACUITY_SCORE: 36
DIFFICULTY_EATING/SWALLOWING: NO
ADLS_ACUITY_SCORE: 35
ADLS_ACUITY_SCORE: 40
ADLS_ACUITY_SCORE: 36
ADLS_ACUITY_SCORE: 36
ADLS_ACUITY_SCORE: 34
ADLS_ACUITY_SCORE: 36
ADLS_ACUITY_SCORE: 35
ADLS_ACUITY_SCORE: 43
ADLS_ACUITY_SCORE: 36
ADLS_ACUITY_SCORE: 36
ADLS_ACUITY_SCORE: 35
ADLS_ACUITY_SCORE: 36
ADLS_ACUITY_SCORE: 37
ADLS_ACUITY_SCORE: 36
PATIENT'S_PREFERRED_MEANS_OF_COMMUNICATION: ENGLISH SPEAKER WITH HEARING LOSS, NO SPEECH PROBLEMS.
ADLS_ACUITY_SCORE: 36
ADLS_ACUITY_SCORE: 38
ADLS_ACUITY_SCORE: 34
ADLS_ACUITY_SCORE: 36
ADLS_ACUITY_SCORE: 35
ADLS_ACUITY_SCORE: 33
BADLS,_PREVIOUS_FUNCTIONAL_LEVEL: INDEPENDENT
ADLS_ACUITY_SCORE: 26
ADLS_ACUITY_SCORE: 38
ADLS_ACUITY_SCORE: 33
ADLS_ACUITY_SCORE: 35
ADLS_ACUITY_SCORE: 34
ADLS_ACUITY_SCORE: 35
ADLS_ACUITY_SCORE: 40
ADLS_ACUITY_SCORE: 35
ADLS_ACUITY_SCORE: 36
ADLS_ACUITY_SCORE: 35
TOILETING: 0-->INDEPENDENT
ADLS_ACUITY_SCORE: 36
ADLS_ACUITY_SCORE: 36
ADLS_ACUITY_SCORE: 35
WEAR_GLASSES_OR_BLIND: YES
ADLS_ACUITY_SCORE: 36
ADLS_ACUITY_SCORE: 34
ADLS_ACUITY_SCORE: 36
ADLS_ACUITY_SCORE: 35
ADLS_ACUITY_SCORE: 34
ADLS_ACUITY_SCORE: 38
ADLS_ACUITY_SCORE: 35
ADLS_ACUITY_SCORE: 34
ADLS_ACUITY_SCORE: 38
ADLS_ACUITY_SCORE: 38
ADLS_ACUITY_SCORE: 36
ADLS_ACUITY_SCORE: 36
ADLS_ACUITY_SCORE: 34
ADLS_ACUITY_SCORE: 40
ADLS_ACUITY_SCORE: 36
ADLS_ACUITY_SCORE: 38
ADLS_ACUITY_SCORE: 40
ADLS_ACUITY_SCORE: 36
ADLS_ACUITY_SCORE: 33
ADLS_ACUITY_SCORE: 34
ADLS_ACUITY_SCORE: 36
ADLS_ACUITY_SCORE: 38
ADLS_ACUITY_SCORE: 38
ADLS_ACUITY_SCORE: 34
ADLS_ACUITY_SCORE: 38
ADLS_ACUITY_SCORE: 40
ADLS_ACUITY_SCORE: 37
ADLS_ACUITY_SCORE: 36
ADLS_ACUITY_SCORE: 36
ADLS_ACUITY_SCORE: 38
IADLS,_PREVIOUS_FUNCTIONAL_LEVEL: INDEPENDENT
ADLS_ACUITY_SCORE: 38
ADLS_ACUITY_SCORE: 35
ADLS_ACUITY_SCORE: 34
ADLS_ACUITY_SCORE: 35
CONCENTRATING,_REMEMBERING_OR_MAKING_DECISIONS_DIFFICULTY: NO
ADLS_ACUITY_SCORE: 34
ADLS_ACUITY_SCORE: 38
ADLS_ACUITY_SCORE: 35
ADLS_ACUITY_SCORE: 41
ADLS_ACUITY_SCORE: 36
ADLS_ACUITY_SCORE: 35
ADLS_ACUITY_SCORE: 33
ADLS_ACUITY_SCORE: 34
ADLS_ACUITY_SCORE: 40
ADLS_ACUITY_SCORE: 38
ADLS_ACUITY_SCORE: 36
ADLS_ACUITY_SCORE: 36
ADLS_ACUITY_SCORE: 38
ADLS_ACUITY_SCORE: 36
ADLS_ACUITY_SCORE: 40
ADLS_ACUITY_SCORE: 40
ADLS_ACUITY_SCORE: 36
DRESSING/BATHING_DIFFICULTY: NO
ADLS_ACUITY_SCORE: 35
ADLS_ACUITY_SCORE: 38
ADLS_ACUITY_SCORE: 34
ADLS_ACUITY_SCORE: 33
ADLS_ACUITY_SCORE: 38
ADLS_ACUITY_SCORE: 37
ADLS_ACUITY_SCORE: 36
ADLS_ACUITY_SCORE: 35
IADLS,_PREVIOUS_FUNCTIONAL_LEVEL: INDEPENDENT
ADLS_ACUITY_SCORE: 35
ADLS_ACUITY_SCORE: 36
ADLS_ACUITY_SCORE: 36
DRESSING/BATHING: BATHING DIFFICULTY, ASSISTANCE 1 PERSON;BATHING DIFFICULTY, DEPENDENT
ADLS_ACUITY_SCORE: 36
ADLS_ACUITY_SCORE: 33
ADLS_ACUITY_SCORE: 35
ADLS_ACUITY_SCORE: 35
ADLS_ACUITY_SCORE: 36
ADLS_ACUITY_SCORE: 38
ADLS_ACUITY_SCORE: 37
ADLS_ACUITY_SCORE: 36
ADLS_ACUITY_SCORE: 36
ADLS_ACUITY_SCORE: 33
ADLS_ACUITY_SCORE: 38
ADLS_ACUITY_SCORE: 38
ADLS_ACUITY_SCORE: 40
TOILETING: 0-->INDEPENDENT
ADLS_ACUITY_SCORE: 38
ADLS_ACUITY_SCORE: 36
ADLS_ACUITY_SCORE: 38
ADLS_ACUITY_SCORE: 34
ADLS_ACUITY_SCORE: 33
ADLS_ACUITY_SCORE: 36
ADLS_ACUITY_SCORE: 40
ADLS_ACUITY_SCORE: 34
ADLS_ACUITY_SCORE: 37
ADLS_ACUITY_SCORE: 36
ADLS_ACUITY_SCORE: 38
ADLS_ACUITY_SCORE: 36
TOILETING_ISSUES: NO
ADLS_ACUITY_SCORE: 37
ADLS_ACUITY_SCORE: 40
ADLS_ACUITY_SCORE: 34
ADLS_ACUITY_SCORE: 36
CHANGE_IN_FUNCTIONAL_STATUS_SINCE_ONSET_OF_CURRENT_ILLNESS/INJURY: YES

## 2023-01-01 ASSESSMENT — ENCOUNTER SYMPTOMS
VOMITING: 1
NECK PAIN: 0
NAUSEA: 1
SHORTNESS OF BREATH: 0
NUMBNESS: 0
DIZZINESS: 1
BACK PAIN: 0
WEAKNESS: 0

## 2023-01-01 ASSESSMENT — PAIN SCALES - GENERAL: PAINLEVEL: NO PAIN (0)

## 2023-01-01 ASSESSMENT — PATIENT HEALTH QUESTIONNAIRE - PHQ9: SUM OF ALL RESPONSES TO PHQ9 QUESTIONS 1 & 2: 0

## 2023-01-15 ENCOUNTER — APPOINTMENT (OUTPATIENT)
Dept: CT IMAGING | Facility: CLINIC | Age: 83
DRG: 064 | End: 2023-01-15
Attending: EMERGENCY MEDICINE
Payer: COMMERCIAL

## 2023-01-15 ENCOUNTER — HOSPITAL ENCOUNTER (INPATIENT)
Facility: CLINIC | Age: 83
LOS: 12 days | Discharge: NURSING, REHAB OR LONG-TERM CARE | DRG: 064 | End: 2023-01-27
Attending: EMERGENCY MEDICINE | Admitting: HOSPITALIST
Payer: COMMERCIAL

## 2023-01-15 ENCOUNTER — APPOINTMENT (OUTPATIENT)
Dept: GENERAL RADIOLOGY | Facility: CLINIC | Age: 83
DRG: 064 | End: 2023-01-15
Attending: EMERGENCY MEDICINE
Payer: COMMERCIAL

## 2023-01-15 DIAGNOSIS — Z79.01 ANTICOAGULATED: ICD-10-CM

## 2023-01-15 DIAGNOSIS — I10 ESSENTIAL HYPERTENSION: ICD-10-CM

## 2023-01-15 DIAGNOSIS — D32.9 MENINGIOMA (H): ICD-10-CM

## 2023-01-15 DIAGNOSIS — I63.40 CEREBROVASCULAR ACCIDENT (CVA) DUE TO EMBOLISM OF CEREBRAL ARTERY (H): Primary | ICD-10-CM

## 2023-01-15 DIAGNOSIS — E11.49 TYPE 2 DIABETES MELLITUS WITH OTHER NEUROLOGIC COMPLICATION, WITH LONG-TERM CURRENT USE OF INSULIN (H): ICD-10-CM

## 2023-01-15 DIAGNOSIS — I61.5 NONTRAUMATIC INTRAVENTRICULAR INTRACEREBRAL HEMORRHAGE, UNSPECIFIED LATERALITY (H): ICD-10-CM

## 2023-01-15 DIAGNOSIS — I61.5 RIGHT-SIDED NONTRAUMATIC INTRAVENTRICULAR INTRACEREBRAL HEMORRHAGE (H): ICD-10-CM

## 2023-01-15 DIAGNOSIS — Z79.4 TYPE 2 DIABETES MELLITUS WITH OTHER NEUROLOGIC COMPLICATION, WITH LONG-TERM CURRENT USE OF INSULIN (H): ICD-10-CM

## 2023-01-15 LAB
ALBUMIN SERPL-MCNC: 4.4 G/DL (ref 3.4–5)
ALP SERPL-CCNC: 76 U/L (ref 40–150)
ALT SERPL W P-5'-P-CCNC: 11 U/L (ref 0–70)
ANION GAP SERPL CALCULATED.3IONS-SCNC: 7 MMOL/L (ref 3–14)
AST SERPL W P-5'-P-CCNC: 11 U/L (ref 0–45)
ATRIAL RATE - MUSE: NORMAL BPM
BASOPHILS # BLD AUTO: 0.1 10E3/UL (ref 0–0.2)
BASOPHILS NFR BLD AUTO: 1 %
BILIRUB SERPL-MCNC: 1.6 MG/DL (ref 0.2–1.3)
BUN SERPL-MCNC: 14 MG/DL (ref 7–30)
CALCIUM SERPL-MCNC: 9.4 MG/DL (ref 8.5–10.1)
CHLORIDE BLD-SCNC: 102 MMOL/L (ref 94–109)
CO2 SERPL-SCNC: 29 MMOL/L (ref 20–32)
CREAT SERPL-MCNC: 1.48 MG/DL (ref 0.66–1.25)
DIASTOLIC BLOOD PRESSURE - MUSE: NORMAL MMHG
EOSINOPHIL # BLD AUTO: 0.1 10E3/UL (ref 0–0.7)
EOSINOPHIL NFR BLD AUTO: 1 %
ERYTHROCYTE [DISTWIDTH] IN BLOOD BY AUTOMATED COUNT: 14.4 % (ref 10–15)
FLUAV RNA SPEC QL NAA+PROBE: NEGATIVE
FLUBV RNA RESP QL NAA+PROBE: NEGATIVE
GFR SERPL CREATININE-BSD FRML MDRD: 47 ML/MIN/1.73M2
GLUCOSE BLD-MCNC: 244 MG/DL (ref 70–99)
GLUCOSE BLDC GLUCOMTR-MCNC: 280 MG/DL (ref 70–99)
HCT VFR BLD AUTO: 44.7 % (ref 40–53)
HGB BLD-MCNC: 13.9 G/DL (ref 13.3–17.7)
IMM GRANULOCYTES # BLD: 0 10E3/UL
IMM GRANULOCYTES NFR BLD: 0 %
INR PPP: 1.13 (ref 0.85–1.15)
INTERPRETATION ECG - MUSE: NORMAL
LYMPHOCYTES # BLD AUTO: 0.9 10E3/UL (ref 0.8–5.3)
LYMPHOCYTES NFR BLD AUTO: 8 %
MCH RBC QN AUTO: 26.9 PG (ref 26.5–33)
MCHC RBC AUTO-ENTMCNC: 31.1 G/DL (ref 31.5–36.5)
MCV RBC AUTO: 87 FL (ref 78–100)
MONOCYTES # BLD AUTO: 0.7 10E3/UL (ref 0–1.3)
MONOCYTES NFR BLD AUTO: 6 %
NEUTROPHILS # BLD AUTO: 8.9 10E3/UL (ref 1.6–8.3)
NEUTROPHILS NFR BLD AUTO: 84 %
NRBC # BLD AUTO: 0 10E3/UL
NRBC BLD AUTO-RTO: 0 /100
P AXIS - MUSE: NORMAL DEGREES
PLATELET # BLD AUTO: 275 10E3/UL (ref 150–450)
POTASSIUM BLD-SCNC: 3.8 MMOL/L (ref 3.4–5.3)
PR INTERVAL - MUSE: NORMAL MS
PROT SERPL-MCNC: 7.1 G/DL (ref 6.8–8.8)
QRS DURATION - MUSE: 94 MS
QT - MUSE: 406 MS
QTC - MUSE: 462 MS
R AXIS - MUSE: 16 DEGREES
RBC # BLD AUTO: 5.17 10E6/UL (ref 4.4–5.9)
RSV RNA SPEC NAA+PROBE: NEGATIVE
SARS-COV-2 RNA RESP QL NAA+PROBE: NEGATIVE
SODIUM SERPL-SCNC: 138 MMOL/L (ref 133–144)
SYSTOLIC BLOOD PRESSURE - MUSE: NORMAL MMHG
T AXIS - MUSE: -48 DEGREES
TROPONIN I SERPL HS-MCNC: 21 NG/L
TSH SERPL DL<=0.005 MIU/L-ACNC: 3.11 MU/L (ref 0.4–4)
VENTRICULAR RATE- MUSE: 78 BPM
WBC # BLD AUTO: 10.7 10E3/UL (ref 4–11)

## 2023-01-15 PROCEDURE — 71046 X-RAY EXAM CHEST 2 VIEWS: CPT

## 2023-01-15 PROCEDURE — 80053 COMPREHEN METABOLIC PANEL: CPT | Performed by: EMERGENCY MEDICINE

## 2023-01-15 PROCEDURE — 85610 PROTHROMBIN TIME: CPT | Performed by: EMERGENCY MEDICINE

## 2023-01-15 PROCEDURE — 250N000009 HC RX 250: Performed by: EMERGENCY MEDICINE

## 2023-01-15 PROCEDURE — 99291 CRITICAL CARE FIRST HOUR: CPT | Mod: 25

## 2023-01-15 PROCEDURE — 250N000011 HC RX IP 250 OP 636: Performed by: HOSPITALIST

## 2023-01-15 PROCEDURE — 200N000001 HC R&B ICU

## 2023-01-15 PROCEDURE — 36415 COLL VENOUS BLD VENIPUNCTURE: CPT | Performed by: EMERGENCY MEDICINE

## 2023-01-15 PROCEDURE — 87637 SARSCOV2&INF A&B&RSV AMP PRB: CPT | Performed by: EMERGENCY MEDICINE

## 2023-01-15 PROCEDURE — 70450 CT HEAD/BRAIN W/O DYE: CPT

## 2023-01-15 PROCEDURE — 96361 HYDRATE IV INFUSION ADD-ON: CPT

## 2023-01-15 PROCEDURE — 84484 ASSAY OF TROPONIN QUANT: CPT | Performed by: EMERGENCY MEDICINE

## 2023-01-15 PROCEDURE — 250N000015 HC RX FACTOR IP MED 636 OP 636: Performed by: EMERGENCY MEDICINE

## 2023-01-15 PROCEDURE — 93005 ELECTROCARDIOGRAM TRACING: CPT

## 2023-01-15 PROCEDURE — 258N000003 HC RX IP 258 OP 636: Performed by: EMERGENCY MEDICINE

## 2023-01-15 PROCEDURE — 70496 CT ANGIOGRAPHY HEAD: CPT

## 2023-01-15 PROCEDURE — 84443 ASSAY THYROID STIM HORMONE: CPT | Performed by: EMERGENCY MEDICINE

## 2023-01-15 PROCEDURE — 250N000012 HC RX MED GY IP 250 OP 636 PS 637: Performed by: HOSPITALIST

## 2023-01-15 PROCEDURE — 30283B1 TRANSFUSION OF NONAUTOLOGOUS 4-FACTOR PROTHROMBIN COMPLEX CONCENTRATE INTO VEIN, PERCUTANEOUS APPROACH: ICD-10-PCS | Performed by: EMERGENCY MEDICINE

## 2023-01-15 PROCEDURE — 70450 CT HEAD/BRAIN W/O DYE: CPT | Mod: 76

## 2023-01-15 PROCEDURE — 99223 1ST HOSP IP/OBS HIGH 75: CPT | Mod: AI | Performed by: HOSPITALIST

## 2023-01-15 PROCEDURE — 99222 1ST HOSP IP/OBS MODERATE 55: CPT | Mod: FS | Performed by: NEUROLOGICAL SURGERY

## 2023-01-15 PROCEDURE — 250N000011 HC RX IP 250 OP 636: Performed by: EMERGENCY MEDICINE

## 2023-01-15 PROCEDURE — 36556 INSERT NON-TUNNEL CV CATH: CPT

## 2023-01-15 PROCEDURE — 85025 COMPLETE CBC W/AUTO DIFF WBC: CPT | Performed by: EMERGENCY MEDICINE

## 2023-01-15 PROCEDURE — 96365 THER/PROPH/DIAG IV INF INIT: CPT

## 2023-01-15 PROCEDURE — 96375 TX/PRO/DX INJ NEW DRUG ADDON: CPT

## 2023-01-15 PROCEDURE — C9803 HOPD COVID-19 SPEC COLLECT: HCPCS

## 2023-01-15 RX ORDER — DEXTROSE MONOHYDRATE 25 G/50ML
25-50 INJECTION, SOLUTION INTRAVENOUS
Status: DISCONTINUED | OUTPATIENT
Start: 2023-01-15 | End: 2023-01-16

## 2023-01-15 RX ORDER — ACETAMINOPHEN 325 MG/10.15ML
650 LIQUID ORAL EVERY 4 HOURS PRN
Status: DISCONTINUED | OUTPATIENT
Start: 2023-01-15 | End: 2023-01-27 | Stop reason: HOSPADM

## 2023-01-15 RX ORDER — ACETAMINOPHEN 325 MG/1
650 TABLET ORAL EVERY 4 HOURS PRN
Status: DISCONTINUED | OUTPATIENT
Start: 2023-01-15 | End: 2023-01-27 | Stop reason: HOSPADM

## 2023-01-15 RX ORDER — HYDRALAZINE HYDROCHLORIDE 20 MG/ML
10 INJECTION INTRAMUSCULAR; INTRAVENOUS ONCE
Status: COMPLETED | OUTPATIENT
Start: 2023-01-15 | End: 2023-01-15

## 2023-01-15 RX ORDER — DIPHENHYDRAMINE HYDROCHLORIDE 50 MG/ML
12.5 INJECTION INTRAMUSCULAR; INTRAVENOUS ONCE
Status: COMPLETED | OUTPATIENT
Start: 2023-01-15 | End: 2023-01-15

## 2023-01-15 RX ORDER — CYCLOBENZAPRINE HCL 10 MG
10 TABLET ORAL
Status: ON HOLD | COMMUNITY
End: 2023-01-26

## 2023-01-15 RX ORDER — NICOTINE POLACRILEX 4 MG
15-30 LOZENGE BUCCAL
Status: DISCONTINUED | OUTPATIENT
Start: 2023-01-15 | End: 2023-01-15

## 2023-01-15 RX ORDER — HALOPERIDOL 5 MG/ML
1 INJECTION INTRAMUSCULAR ONCE
Status: COMPLETED | OUTPATIENT
Start: 2023-01-15 | End: 2023-01-15

## 2023-01-15 RX ORDER — LABETALOL HYDROCHLORIDE 5 MG/ML
20 INJECTION, SOLUTION INTRAVENOUS ONCE
Status: COMPLETED | OUTPATIENT
Start: 2023-01-15 | End: 2023-01-15

## 2023-01-15 RX ORDER — ONDANSETRON 2 MG/ML
4 INJECTION INTRAMUSCULAR; INTRAVENOUS ONCE
Status: COMPLETED | OUTPATIENT
Start: 2023-01-15 | End: 2023-01-15

## 2023-01-15 RX ORDER — IOPAMIDOL 755 MG/ML
75 INJECTION, SOLUTION INTRAVASCULAR ONCE
Status: COMPLETED | OUTPATIENT
Start: 2023-01-15 | End: 2023-01-15

## 2023-01-15 RX ORDER — NICOTINE POLACRILEX 4 MG
15-30 LOZENGE BUCCAL
Status: DISCONTINUED | OUTPATIENT
Start: 2023-01-15 | End: 2023-01-16

## 2023-01-15 RX ORDER — DEXTROSE MONOHYDRATE 25 G/50ML
25-50 INJECTION, SOLUTION INTRAVENOUS
Status: DISCONTINUED | OUTPATIENT
Start: 2023-01-15 | End: 2023-01-15

## 2023-01-15 RX ADMIN — NICARDIPINE HYDROCHLORIDE 2.5 MG/HR: 0.2 INJECTION INTRAVENOUS at 17:21

## 2023-01-15 RX ADMIN — PROCHLORPERAZINE EDISYLATE 5 MG: 5 INJECTION INTRAMUSCULAR; INTRAVENOUS at 18:36

## 2023-01-15 RX ADMIN — HALOPERIDOL LACTATE 1 MG: 5 INJECTION, SOLUTION INTRAMUSCULAR at 21:01

## 2023-01-15 RX ADMIN — HYDRALAZINE HYDROCHLORIDE 10 MG: 20 INJECTION, SOLUTION INTRAMUSCULAR; INTRAVENOUS at 17:06

## 2023-01-15 RX ADMIN — ONDANSETRON 4 MG: 2 INJECTION INTRAMUSCULAR; INTRAVENOUS at 17:21

## 2023-01-15 RX ADMIN — SODIUM CHLORIDE 500 ML: 9 INJECTION, SOLUTION INTRAVENOUS at 15:04

## 2023-01-15 RX ADMIN — LABETALOL HYDROCHLORIDE 20 MG: 5 INJECTION INTRAVENOUS at 15:53

## 2023-01-15 RX ADMIN — NICARDIPINE HYDROCHLORIDE 7.5 MG/HR: 0.2 INJECTION INTRAVENOUS at 21:47

## 2023-01-15 RX ADMIN — DIPHENHYDRAMINE HYDROCHLORIDE 12.5 MG: 50 INJECTION, SOLUTION INTRAMUSCULAR; INTRAVENOUS at 18:36

## 2023-01-15 RX ADMIN — INSULIN GLARGINE 10 UNITS: 100 INJECTION, SOLUTION SUBCUTANEOUS at 23:02

## 2023-01-15 RX ADMIN — IOPAMIDOL 75 ML: 755 INJECTION, SOLUTION INTRAVENOUS at 16:16

## 2023-01-15 RX ADMIN — HALOPERIDOL LACTATE 1 MG: 5 INJECTION, SOLUTION INTRAMUSCULAR at 23:58

## 2023-01-15 RX ADMIN — PROTHROMBIN, COAGULATION FACTOR VII HUMAN, COAGULATION FACTOR IX HUMAN, COAGULATION FACTOR X HUMAN, PROTEIN C, PROTEIN S HUMAN, AND WATER 3400 UNITS: KIT at 17:41

## 2023-01-15 RX ADMIN — INSULIN ASPART 2 UNITS: 100 INJECTION, SOLUTION INTRAVENOUS; SUBCUTANEOUS at 23:02

## 2023-01-15 RX ADMIN — SODIUM CHLORIDE 90 ML: 9 INJECTION, SOLUTION INTRAVENOUS at 16:17

## 2023-01-15 ASSESSMENT — ENCOUNTER SYMPTOMS
DIZZINESS: 1
LIGHT-HEADEDNESS: 1
WEAKNESS: 1
CONFUSION: 1

## 2023-01-15 ASSESSMENT — ACTIVITIES OF DAILY LIVING (ADL)
ADLS_ACUITY_SCORE: 35
ADLS_ACUITY_SCORE: 31
ADLS_ACUITY_SCORE: 35

## 2023-01-15 NOTE — ED TRIAGE NOTES
Pt stated he has lost some time over the last few days off and on - confused about the time of day - woke up at 8pm thinking it was am a couple of times . Pt states he is weak . Gets more exhausted doing chores. Pt has hx of heart failure.

## 2023-01-15 NOTE — ED PROVIDER NOTES
History     Chief Complaint:  Generalized Weakness and Altered Mental Status       The history is provided by the patient and a relative (daughter).      Tc Garcia is a 82 year old male on Eliquis with history of atrial fibrillation who presents with increased weakness and dizzy, best characterized as lightheadedness, for the last 2 days.  Patient states he has had a few episodes of near-falls but has only fallen once approximately 1.5 weeks ago when he slipped in the bathtub. He denies head injury at this time. Per his daughter, the patient's wife is also concerned of increased confusion with this, stating he often is not sure what time of day it is. She also states he has not been eating, drinking, or sleeping well. His daughter denies any history of memory decline.  He lives at home with his wife and uses a lift chair when going up and down the staircase. He denies use of cigarettes or alcohol. He denies fever, chills, chest pain, shortness of breath, nausea, vomiting, diarrhea, rash, swelling of any areas, and dysuria.      Independent Historian: patient provided history but daughter also supplemented history.     Review of External Notes: see MDM below     ROS:  Review of Systems   Neurological: Positive for dizziness, weakness and light-headedness.   Psychiatric/Behavioral: Positive for confusion.   All other systems reviewed and are negative.      Allergies:  No Known Drug Allergies     Medications:    Eliquis  Carvedilol  Lasix  Glucagon  Humalog  Irbesartan  Lovastatin  Nystatin    Past Medical History:    Chronic anemia  CKD  CRF  Hyperlipidemia  PAF  Hyperparathyroidism   Type 2 diabetes mellitus  Hypertension   CHF    Past Surgical History:    Cardioversion  Heart cath, left  Tonsillectomy  Adenoidectomy  Chest tube drain tunneled, left  Chest tube placement  Chest tube removal x2  Cholecystectomy  Herniorrhaphy inguinal bilateral   Hernia repair    Family History:    Diabetes  Heart disease    COPD    Social History:  No alcohol or cigarette use  PCP: Jessika Cordoba   The patient presents with his daughter    Physical Exam     Patient Vitals for the past 24 hrs:   BP Temp Pulse Resp SpO2 Weight   01/15/23 1822 132/71 -- 83 27 -- --   01/15/23 1817 126/73 -- 81 25 -- --   01/15/23 1811 132/84 -- 86 13 -- --   01/15/23 1807 (!) 149/78 -- 81 18 -- --   01/15/23 1802 (!) 135/122 -- 86 -- -- --   01/15/23 1757 (!) 142/89 -- 78 20 -- --   01/15/23 1753 (!) 142/89 -- 75 18 -- --   01/15/23 1748 (!) 147/81 -- 77 -- -- --   01/15/23 1743 (!) 159/85 -- 77 27 -- --   01/15/23 1738 (!) 145/137 -- 76 -- -- --   01/15/23 1733 (!) 163/90 -- 72 30 95 % --   01/15/23 1730 (!) 178/106 -- 78 22 98 % --   01/15/23 1715 (!) 187/158 -- 75 12 -- --   01/15/23 1706 (!) 205/123 -- -- -- -- --   01/15/23 1647 (!) 174/118 -- 63 26 98 % --   01/15/23 1645 -- -- 64 15 98 % --   01/15/23 1644 -- -- 64 -- -- --   01/15/23 1643 (!) 154/131 -- 72 -- -- --   01/15/23 1601 (!) 207/123 -- 70 -- 99 % --   01/15/23 1556 (!) 213/144 -- 86 -- 96 % --   01/15/23 1531 (!) 195/146 -- 84 -- 90 % --   01/15/23 1431 (!) 177/114 -- 76 -- 99 % --   01/15/23 1412 (!) 182/124 -- -- -- 98 % --   01/15/23 1316 (!) 199/120 98.5  F (36.9  C) 86 16 97 % 71.2 kg (157 lb)        Physical Exam  Constitutional: Well developed, nontox appearance  Head: Atraumatic.   Mouth/Throat: Oropharynx is clear and tacky  Neck:  no stridor  Eyes: no scleral icterus  Cardiovascular: Irregularly irregular rate and rhythm, 2+ bilat radial pulses  Pulmonary/Chest: nml resp effort  Abdominal: ND, soft, NT, no rebound or guarding   Ext: Warm, well perfused, no edema  Neurological: A&O,  GCS 15, symmetric facies, moves ext x4, 5/5 strength throughout bilateral upper and lower extremities  Skin: Skin is warm and dry.   Psychiatric: Behavior is normal. Thought content normal.   Nursing note and vitals reviewed.    Emergency Department Course     ECG  ECG taken at 1442,  ECG read at 1446  Atrial fibrillation  Incomplete right bundle branch block  Anteroseptal infarct, age undetermined  ST & T wave abnormality, consider inferior ischemia  Abnormal ECG   No change as compared to prior, dated 3/17/21.  Rate 78 bpm. WA interval * ms. QRS duration 94 ms. QT/QTc 406/462 ms. P-R-T axes * 16 -48.     Imaging:  CTA Head with Contrast   Final Result   IMPRESSION:    1.  Negative for intracranial aneurysm or AVM.   2.  Grossly similar intraventricular hemorrhage without evidence of active extravasation.   3.  Focal moderate stenosis of the bilateral P1 posterior cerebral artery.      CT Head w/o Contrast   Final Result   IMPRESSION:   1.  Large isolated intraventricular hemorrhage of the right lateral ventricle. A CT angiogram is recommended for further evaluation to assess for potential vascular malformation. Pending angiography findings, an MRI may be indicated.   2.  Minimal interval increase in size of the occipital horn of the right lateral ventricle. Ventricular size and morphology is otherwise no change.   3.  Probable moderate sequela of chronic small vessel ischemic disease.      These findings were discussed with Dr. Hamilton at 1:33 PM on 01/15/2023.      XR Chest 2 Views   Final Result   IMPRESSION: Small patchy opacity in the lung bases best seen on the lateral view is new, and could be due to superimposed soft tissue shadows, pneumonia or mass. Consider a follow-up chest CT for better evaluation. Stable linear opacities in the left    midlung and lung base, likely related to chronic fibrosis. Stable left costophrenic space blunting, likely related to chronic pleural thickening. No right pleural effusion or pneumothorax bilaterally. Stable mild cardiomegaly and enlarged main pulmonary    artery. Old healed right lateral rib fracture and mid sternal fracture deformities.      CT Head w/o Contrast    (Results Pending)      Report per radiology    Laboratory:  Labs Ordered and Resulted  from Time of ED Arrival to Time of ED Departure   COMPREHENSIVE METABOLIC PANEL - Abnormal       Result Value    Sodium 138      Potassium 3.8      Chloride 102      Carbon Dioxide (CO2) 29      Anion Gap 7      Urea Nitrogen 14      Creatinine 1.48 (*)     Calcium 9.4      Glucose 244 (*)     Alkaline Phosphatase 76      AST 11      ALT 11      Protein Total 7.1      Albumin 4.4      Bilirubin Total 1.6 (*)     GFR Estimate 47 (*)    CBC WITH PLATELETS AND DIFFERENTIAL - Abnormal    WBC Count 10.7      RBC Count 5.17      Hemoglobin 13.9      Hematocrit 44.7      MCV 87      MCH 26.9      MCHC 31.1 (*)     RDW 14.4      Platelet Count 275      % Neutrophils 84      % Lymphocytes 8      % Monocytes 6      % Eosinophils 1      % Basophils 1      % Immature Granulocytes 0      NRBCs per 100 WBC 0      Absolute Neutrophils 8.9 (*)     Absolute Lymphocytes 0.9      Absolute Monocytes 0.7      Absolute Eosinophils 0.1      Absolute Basophils 0.1      Absolute Immature Granulocytes 0.0      Absolute NRBCs 0.0     INR - Normal    INR 1.13     TROPONIN I - Normal    Troponin I High Sensitivity 21     TSH WITH FREE T4 REFLEX - Normal    TSH 3.11     INFLUENZA A/B & SARS-COV2 PCR MULTIPLEX - Normal    Influenza A PCR Negative      Influenza B PCR Negative      RSV PCR Negative      SARS CoV2 PCR Negative     ROUTINE UA WITH MICROSCOPIC REFLEX TO CULTURE        Procedures      Arterial Line Insertion      Procedure: Arterial Line Placement     Indication Critical care blood pressure monitoring    Consent:   Verbal from Patient     Location: Right Radial     Preparation: Chlorhexidine     Anesthesia/Sedation: Lidocaine - 1%    Procedure Detail: Self-contained Guidewire Catheter (Arrow Arterial Catheterization Kit)  The patient's extremity was properly positioned. The skin was prepped and draped with sterile technique. Local anesthesia was infiltrated. The artery was identified by ultrasound. The needle/catheter/wire assembly  was advanced until a flash of arterial blood confirmed position within the lumen of the artery. The needle was stabilized and the guidewire was advanced. The catheter was then advanced and the needle/guidewire were removed.    Pressure tubing was attached to the catheter.  The catheter was secured.  A sterile dressing was placed.    Patient Status: The patient tolerated the procedure well: Yes. There were no complications.    Emergency Department Course & Assessments:    Interventions:  Medications   niCARdipine 40 mg in 200 mL NS (CARDENE) infusion (10 mg/hr Intravenous Rate/Dose Change 1/15/23 1807)   prochlorperazine (COMPAZINE) injection 5 mg (has no administration in time range)   diphenhydrAMINE (BENADRYL) injection 12.5 mg (has no administration in time range)   0.9% sodium chloride BOLUS (0 mLs Intravenous Stopped 1/15/23 1552)   labetalol (NORMODYNE/TRANDATE) injection 20 mg (20 mg Intravenous Given 1/15/23 1553)   iopamidol (ISOVUE-370) solution 75 mL (75 mLs Intravenous Given 1/15/23 1616)   Saline (90 mLs As instructed Given 1/15/23 1617)   hydrALAZINE (APRESOLINE) injection 10 mg (10 mg Intravenous Given 1/15/23 1706)   ondansetron (ZOFRAN) injection 4 mg (4 mg Intravenous Given 1/15/23 1721)   prothrombin 4 factor complex concentrate (KCENTRA) infusion 3,400 Units (3,400 Units Intravenous Given 1/15/23 1741)        Independent Interpretation (X-rays, CTs, rhythm strip):  See MDM below     Consultations/Discussion of Management or Tests:  1746 I spoke with Beau Baron PA-C, stroke neurology.   1804 I spoke with Beau Baron PA-C, stroke neurology.  1827 I consulted with Dr. Moya, hospitalist, regarding the patient's history and presentation here in the emergency department who accepted the patient for admission.  1833 I spoke with Beau Baron PA-C, stroke neurology.   1835 I spoke with Beau Baron PA-C, stroke neurology.     Social Determinants of Health affecting care:  See MDM below         Disposition:  The patient was admitted to the hospital under the care of Dr. Moya.     Impression & Plan      Medical Decision Makin year old male presenting w/ generalized weakness, lightheadedness     Social determinants affecting patient's health include:  Age     I reviewed medical records from  nephrology office visit on 10/11/2022 for an overview of the patient's health     DDx includes viral syndrome NOS, influenza-like illness, influenza, COVID-19, intracranial hemorrhage, subacute CVA, UTI, dehydration, dementia.  EKG interpretation as noted above and interpreted by myself consistent with atrial fibrillation.  CT imaging demonstrated a large right intraventricular hemorrhage independently viewed on my own interpreted as by radiology as well.  I discussed patient with stroke neurology as well as neurosurgery.  Nicardipine ordered prior to discussion for strict blood pressure control which neuro stroke and neurosurgery agreed with.  Neurosurgery evaluated the patient in the emergency department.  He was subsequently admitted to the hospital service under ICU status.  He was given PCC to reverse his anticoagulation.    Arterial line placed prior to transfer to the ICU for blood pressure monitoring.  The patient and his family were counseled on all results, disposition and diagnosis.  They are understanding and agreeable to plan. Patient admitted in critical condition.      Critical Care time:  was 70 minutes for this patient excluding procedures.    Diagnosis:    ICD-10-CM    1. Right-sided nontraumatic intraventricular intracerebral hemorrhage (H)  I61.5       2. Essential hypertension  I10       3. Anticoagulated  Z79.01            Scribe Disclosure:  Emerita HALEY, am serving as a scribe at 2:09 PM on 1/15/2023 to document services personally performed by León Hamilton MD based on my observations and the provider's statements to me.     1/15/2023   León Hamilton MD           León Hamilton MD  01/15/23 2969

## 2023-01-16 ENCOUNTER — APPOINTMENT (OUTPATIENT)
Dept: CT IMAGING | Facility: CLINIC | Age: 83
DRG: 064 | End: 2023-01-16
Attending: PSYCHIATRY & NEUROLOGY
Payer: COMMERCIAL

## 2023-01-16 ENCOUNTER — APPOINTMENT (OUTPATIENT)
Dept: CT IMAGING | Facility: CLINIC | Age: 83
DRG: 064 | End: 2023-01-16
Attending: HOSPITALIST
Payer: COMMERCIAL

## 2023-01-16 LAB
ALBUMIN UR-MCNC: 30 MG/DL
ANION GAP SERPL CALCULATED.3IONS-SCNC: 15 MMOL/L (ref 3–14)
APPEARANCE UR: CLEAR
BASOPHILS # BLD AUTO: 0.1 10E3/UL (ref 0–0.2)
BASOPHILS NFR BLD AUTO: 0 %
BILIRUB UR QL STRIP: NEGATIVE
BUN SERPL-MCNC: 26 MG/DL (ref 7–30)
CALCIUM SERPL-MCNC: 9.2 MG/DL (ref 8.5–10.1)
CHLORIDE BLD-SCNC: 103 MMOL/L (ref 94–109)
CO2 SERPL-SCNC: 22 MMOL/L (ref 20–32)
COLOR UR AUTO: YELLOW
CREAT SERPL-MCNC: 1.51 MG/DL (ref 0.66–1.25)
EOSINOPHIL # BLD AUTO: 0 10E3/UL (ref 0–0.7)
EOSINOPHIL NFR BLD AUTO: 0 %
ERYTHROCYTE [DISTWIDTH] IN BLOOD BY AUTOMATED COUNT: 14.6 % (ref 10–15)
GFR SERPL CREATININE-BSD FRML MDRD: 46 ML/MIN/1.73M2
GLUCOSE BLD-MCNC: 431 MG/DL (ref 70–99)
GLUCOSE BLDC GLUCOMTR-MCNC: 108 MG/DL (ref 70–99)
GLUCOSE BLDC GLUCOMTR-MCNC: 135 MG/DL (ref 70–99)
GLUCOSE BLDC GLUCOMTR-MCNC: 174 MG/DL (ref 70–99)
GLUCOSE BLDC GLUCOMTR-MCNC: 197 MG/DL (ref 70–99)
GLUCOSE BLDC GLUCOMTR-MCNC: 199 MG/DL (ref 70–99)
GLUCOSE BLDC GLUCOMTR-MCNC: 214 MG/DL (ref 70–99)
GLUCOSE BLDC GLUCOMTR-MCNC: 232 MG/DL (ref 70–99)
GLUCOSE BLDC GLUCOMTR-MCNC: 298 MG/DL (ref 70–99)
GLUCOSE BLDC GLUCOMTR-MCNC: 368 MG/DL (ref 70–99)
GLUCOSE BLDC GLUCOMTR-MCNC: 38 MG/DL (ref 70–99)
GLUCOSE BLDC GLUCOMTR-MCNC: 435 MG/DL (ref 70–99)
GLUCOSE BLDC GLUCOMTR-MCNC: 451 MG/DL (ref 70–99)
GLUCOSE BLDC GLUCOMTR-MCNC: 73 MG/DL (ref 70–99)
GLUCOSE BLDC GLUCOMTR-MCNC: 99 MG/DL (ref 70–99)
GLUCOSE UR STRIP-MCNC: 500 MG/DL
HCT VFR BLD AUTO: 36.7 % (ref 40–53)
HGB BLD-MCNC: 12.1 G/DL (ref 13.3–17.7)
HGB UR QL STRIP: ABNORMAL
HYALINE CASTS: 1 /LPF
IMM GRANULOCYTES # BLD: 0.1 10E3/UL
IMM GRANULOCYTES NFR BLD: 1 %
KETONES UR STRIP-MCNC: 20 MG/DL
LEUKOCYTE ESTERASE UR QL STRIP: NEGATIVE
LYMPHOCYTES # BLD AUTO: 0.7 10E3/UL (ref 0.8–5.3)
LYMPHOCYTES NFR BLD AUTO: 4 %
MAGNESIUM SERPL-MCNC: 2 MG/DL (ref 1.6–2.3)
MCH RBC QN AUTO: 27.8 PG (ref 26.5–33)
MCHC RBC AUTO-ENTMCNC: 33 G/DL (ref 31.5–36.5)
MCV RBC AUTO: 84 FL (ref 78–100)
MONOCYTES # BLD AUTO: 0.5 10E3/UL (ref 0–1.3)
MONOCYTES NFR BLD AUTO: 3 %
MUCOUS THREADS #/AREA URNS LPF: PRESENT /LPF
NEUTROPHILS # BLD AUTO: 14.4 10E3/UL (ref 1.6–8.3)
NEUTROPHILS NFR BLD AUTO: 92 %
NITRATE UR QL: NEGATIVE
NRBC # BLD AUTO: 0 10E3/UL
NRBC BLD AUTO-RTO: 0 /100
PH UR STRIP: 6 [PH] (ref 5–7)
PLATELET # BLD AUTO: 215 10E3/UL (ref 150–450)
POTASSIUM BLD-SCNC: 4.1 MMOL/L (ref 3.4–5.3)
PROCALCITONIN SERPL-MCNC: 0.2 NG/ML
RBC # BLD AUTO: 4.36 10E6/UL (ref 4.4–5.9)
RBC URINE: 165 /HPF
SODIUM SERPL-SCNC: 140 MMOL/L (ref 133–144)
SP GR UR STRIP: 1.01 (ref 1–1.03)
UROBILINOGEN UR STRIP-MCNC: NORMAL MG/DL
WBC # BLD AUTO: 15.7 10E3/UL (ref 4–11)
WBC URINE: 0 /HPF

## 2023-01-16 PROCEDURE — 81001 URINALYSIS AUTO W/SCOPE: CPT | Performed by: EMERGENCY MEDICINE

## 2023-01-16 PROCEDURE — 80048 BASIC METABOLIC PNL TOTAL CA: CPT | Performed by: HOSPITALIST

## 2023-01-16 PROCEDURE — 200N000001 HC R&B ICU

## 2023-01-16 PROCEDURE — 255N000002 HC RX 255 OP 636: Performed by: HOSPITALIST

## 2023-01-16 PROCEDURE — 70450 CT HEAD/BRAIN W/O DYE: CPT

## 2023-01-16 PROCEDURE — 83735 ASSAY OF MAGNESIUM: CPT | Performed by: PSYCHIATRY & NEUROLOGY

## 2023-01-16 PROCEDURE — 250N000009 HC RX 250: Performed by: STUDENT IN AN ORGANIZED HEALTH CARE EDUCATION/TRAINING PROGRAM

## 2023-01-16 PROCEDURE — 250N000012 HC RX MED GY IP 250 OP 636 PS 637: Performed by: HOSPITALIST

## 2023-01-16 PROCEDURE — 99291 CRITICAL CARE FIRST HOUR: CPT | Mod: FS | Performed by: STUDENT IN AN ORGANIZED HEALTH CARE EDUCATION/TRAINING PROGRAM

## 2023-01-16 PROCEDURE — 85025 COMPLETE CBC W/AUTO DIFF WBC: CPT | Performed by: HOSPITALIST

## 2023-01-16 PROCEDURE — 84145 PROCALCITONIN (PCT): CPT | Performed by: HOSPITALIST

## 2023-01-16 PROCEDURE — 71250 CT THORAX DX C-: CPT

## 2023-01-16 PROCEDURE — 99233 SBSQ HOSP IP/OBS HIGH 50: CPT | Performed by: HOSPITALIST

## 2023-01-16 PROCEDURE — 250N000011 HC RX IP 250 OP 636: Performed by: PSYCHIATRY & NEUROLOGY

## 2023-01-16 PROCEDURE — 250N000011 HC RX IP 250 OP 636: Performed by: HOSPITALIST

## 2023-01-16 PROCEDURE — 258N000001 HC RX 258: Performed by: HOSPITALIST

## 2023-01-16 PROCEDURE — A9585 GADOBUTROL INJECTION: HCPCS | Performed by: HOSPITALIST

## 2023-01-16 PROCEDURE — 258N000003 HC RX IP 258 OP 636: Performed by: HOSPITALIST

## 2023-01-16 PROCEDURE — 250N000011 HC RX IP 250 OP 636: Performed by: STUDENT IN AN ORGANIZED HEALTH CARE EDUCATION/TRAINING PROGRAM

## 2023-01-16 RX ORDER — DEXMEDETOMIDINE HYDROCHLORIDE 4 UG/ML
.1-1.2 INJECTION, SOLUTION INTRAVENOUS CONTINUOUS
Status: DISCONTINUED | OUTPATIENT
Start: 2023-01-16 | End: 2023-01-18

## 2023-01-16 RX ORDER — DEXTROSE MONOHYDRATE 25 G/50ML
25-50 INJECTION, SOLUTION INTRAVENOUS
Status: DISCONTINUED | OUTPATIENT
Start: 2023-01-16 | End: 2023-01-17

## 2023-01-16 RX ORDER — HALOPERIDOL 5 MG/ML
5 INJECTION INTRAMUSCULAR ONCE
Status: COMPLETED | OUTPATIENT
Start: 2023-01-16 | End: 2023-01-16

## 2023-01-16 RX ORDER — NICOTINE POLACRILEX 4 MG
15-30 LOZENGE BUCCAL
Status: DISCONTINUED | OUTPATIENT
Start: 2023-01-16 | End: 2023-01-17

## 2023-01-16 RX ORDER — GADOBUTROL 604.72 MG/ML
6 INJECTION INTRAVENOUS ONCE
Status: COMPLETED | OUTPATIENT
Start: 2023-01-16 | End: 2023-01-16

## 2023-01-16 RX ORDER — HALOPERIDOL 5 MG/ML
10 INJECTION INTRAMUSCULAR EVERY 6 HOURS PRN
Status: COMPLETED | OUTPATIENT
Start: 2023-01-16 | End: 2023-01-17

## 2023-01-16 RX ORDER — DEXTROSE MONOHYDRATE 100 MG/ML
INJECTION, SOLUTION INTRAVENOUS CONTINUOUS PRN
Status: DISCONTINUED | OUTPATIENT
Start: 2023-01-16 | End: 2023-01-27 | Stop reason: HOSPADM

## 2023-01-16 RX ORDER — HALOPERIDOL 5 MG/ML
10 INJECTION INTRAMUSCULAR EVERY 6 HOURS PRN
Status: DISCONTINUED | OUTPATIENT
Start: 2023-01-16 | End: 2023-01-16

## 2023-01-16 RX ORDER — HALOPERIDOL 5 MG/ML
2 INJECTION INTRAMUSCULAR EVERY 6 HOURS PRN
Status: DISCONTINUED | OUTPATIENT
Start: 2023-01-16 | End: 2023-01-16

## 2023-01-16 RX ADMIN — DEXTROSE MONOHYDRATE 25 ML: 25 INJECTION, SOLUTION INTRAVENOUS at 23:51

## 2023-01-16 RX ADMIN — HALOPERIDOL LACTATE 5 MG: 5 INJECTION, SOLUTION INTRAMUSCULAR at 18:45

## 2023-01-16 RX ADMIN — DEXMEDETOMIDINE HYDROCHLORIDE 0.2 MCG/KG/HR: 400 INJECTION INTRAVENOUS at 19:51

## 2023-01-16 RX ADMIN — HALOPERIDOL LACTATE 5 MG: 5 INJECTION, SOLUTION INTRAMUSCULAR at 01:34

## 2023-01-16 RX ADMIN — GADOBUTROL 6 ML: 604.72 INJECTION INTRAVENOUS at 11:59

## 2023-01-16 RX ADMIN — HALOPERIDOL LACTATE 2 MG: 5 INJECTION, SOLUTION INTRAMUSCULAR at 18:22

## 2023-01-16 RX ADMIN — SODIUM CHLORIDE 5.5 UNITS/HR: 9 INJECTION, SOLUTION INTRAVENOUS at 09:11

## 2023-01-16 RX ADMIN — SODIUM CHLORIDE 8 UNITS/HR: 9 INJECTION, SOLUTION INTRAVENOUS at 13:28

## 2023-01-16 RX ADMIN — NICARDIPINE HYDROCHLORIDE 2.5 MG/HR: 0.2 INJECTION INTRAVENOUS at 10:44

## 2023-01-16 ASSESSMENT — ACTIVITIES OF DAILY LIVING (ADL)
ADLS_ACUITY_SCORE: 35
ADLS_ACUITY_SCORE: 26
ADLS_ACUITY_SCORE: 39
ADLS_ACUITY_SCORE: 26
ADLS_ACUITY_SCORE: 31
ADLS_ACUITY_SCORE: 26
ADLS_ACUITY_SCORE: 26
ADLS_ACUITY_SCORE: 39
ADLS_ACUITY_SCORE: 39
ADLS_ACUITY_SCORE: 26

## 2023-01-16 NOTE — PROGRESS NOTES
[facility]    Neurosurgery Progress Note    Date of Service (when I saw the patient): 01/16/2023     Assessment & Plan    82M with PMH of stage III chronic kidney disease, anemia, paroxysmal atrial fibrillation on anticoagulation, pulmonary hypertension, essential hypertension, type 2 diabetes mellitus on insulin pump presents with increasing weakness, dizziness and lightheadedness with new intraventricular hemorrhage. Repeat head CT was stable. No complaints this AM. Continues to have bilateral upper extremity tremors.     Plan:  -No plans for neurosurgical intervention  -Continue cares per neurology including follow up recommendations  -Neurosurgery will sign off. Please page with questions.      I have discussed the following assessment and plan Dr. Marcano who is in agreement with initial plan and will follow up with further consultation recommendations.    Jessika Brunner CNP  [facility] Neurosurgery  [address] [address]    Tel [phone]    Interval History   Stable.    Physical Exam   Temp: 97.5  F (36.4  C) Temp src: Oral BP: 114/73 Pulse: 84   Resp: 16 SpO2: 96 %      Vitals:    01/15/23 1316 01/15/23 1954 01/15/23 2121   Weight: 157 lb (71.2 kg) 158 lb (71.7 kg) 147 lb 4.3 oz (66.8 kg)     Vital Signs with Ranges  Temp:  [97.5  F (36.4  C)-100  F (37.8  C)] 97.5  F (36.4  C)  Pulse:  [] 84  Resp:  [0-40] 16  BP: ()/() 114/73  MAP:  [66 mmHg-105 mmHg] 84 mmHg  Arterial Line BP: ()/(33-75) 134/58  SpO2:  [90 %-100 %] 96 %  I/O last 3 completed shifts:  In: 332.5 [I.V.:332.5]  Out: 700 [Urine:700]     , Blood pressure 114/73, pulse 84, temperature 97.5  F (36.4  C), temperature source Oral, resp. rate 16, height 5' (1.524 m), weight 147 lb 4.3 oz (66.8 kg), SpO2 96 %.  147 lbs 4.28 oz  HEENT:  Normocephalic.  PERRLA.  Heart:  No peripheral edema  Lungs:  No SOB  Skin:  Warm and dry, good  capillary refill.  Extremities:  Good radial and dorsalis pedis pulses bilaterally, no edema, cyanosis or clubbing.    NEUROLOGICAL EXAMINATION:   Mental status:  Alert and Oriented x 3, speech is fluent.  Cranial nerves:  II-XII intact except right>left upper extremity tremor.  Motor:  Strength is 5/5 throughout the upper and lower extremities  Shoulder Abduction:  Right:  5/5   Left:  5/5  Biceps:                      Right:  5/5   Left:  5/5  Triceps:                     Right:  5/5   Left:  5/5  Wrist Extensors:       Right:  5/5   Left:  5/5  Wrist Flexors:           Right:  5/5   Left:  5/5  interosseus :            Right:  5/5   Left:  5/5   Hip Flexor:                Right: 5/5  Left:  5/5  Hip Adductor:             Right:  5/5  Left:  5/5  Hip Abductor:             Right:  5/5  Left:  5/5  Gastroc Soleus:        Right:  5/5  Left:  5/5  Tib/Ant:                      Right:  5/5  Left:  5/5  EHL:                     Right:  5/5  Left:  5/5  Sensation:  intact  Coordination:  Right>left upper extremity tremor      Medications     dextrose       insulin regular 3 Units/hr (01/16/23 1510)     niCARdipine 2.5 mg/hr (01/16/23 1445)         Data     CBC RESULTS:   Recent Labs   Lab Test 01/16/23  0448   WBC 15.7*   RBC 4.36*   HGB 12.1*   HCT 36.7*   MCV 84   MCH 27.8   MCHC 33.0   RDW 14.6        Basic Metabolic Panel:  Lab Results   Component Value Date     01/16/2023     07/12/2021      Lab Results   Component Value Date    POTASSIUM 4.1 01/16/2023    POTASSIUM 4.0 07/12/2021     Lab Results   Component Value Date    CHLORIDE 103 01/16/2023    CHLORIDE 102 11/04/2021    CHLORIDE 100 07/12/2021     Lab Results   Component Value Date    LLOYD 9.2 01/16/2023    LLOYD 9.3 07/12/2021     Lab Results   Component Value Date    CO2 22 01/16/2023    CO2 26 07/12/2021     Lab Results   Component Value Date    BUN 26 01/16/2023    BUN 18 07/12/2021    BUN 10 07/12/2021     Lab Results   Component Value Date     CR 1.51 01/16/2023    CR 1.73 07/12/2021     Lab Results   Component Value Date     01/16/2023     01/16/2023     07/12/2021     INR:  Lab Results   Component Value Date    INR 1.13 01/15/2023    INR 1.50 11/21/2020    INR 1.18 07/17/2020    INR 1.49 05/10/2020    INR 1.88 04/16/2020    INR 1.28 02/26/2020    INR 1.65 12/31/2019    INR 1.44 11/07/2019    INR 1.37 10/31/2019    INR 1.40 10/30/2019

## 2023-01-16 NOTE — ED NOTES
Patient has not voided since arrival. Unable to void despite multiple attempts.   Bladder scan was 374.   Patient said he is very thirsty.   Will try to give sips of water after returning from Art line placement today.

## 2023-01-16 NOTE — ED NOTES
Nicardipine drip rate reduced to 7.5 mg from 10 mg IV. Patient's systolic BP is now below 120's. Patient is currently in stab room for art line placement.

## 2023-01-16 NOTE — PROGRESS NOTES
Patient came to ICU around 2200. He is only alert to himself. He is disoriented to time, place and situation. He is on nicardipine gtt 2.5mg/hr. The goal is SBP <140. Patient was bladder for 451 at 0000. He was I&O Cath for 500. He bladder scanned again at 0600 for 280. Attempted to I&O cath but was unsuccesful due to resistance. Patient kept on trying to get out of bed and was restless with cares IV haldol 5mg given. He slept intermittently after. Patient rhythm is [email or web address] RVR.

## 2023-01-16 NOTE — ED NOTES
Hand off report to ICU given to AUGUSTIN Pagan.   Patient is currently in stab room for art line placement.

## 2023-01-16 NOTE — CONSULTS
[facility]    Neurosurgery Consultation     Date of Admission:  1/15/2023  Date of Consult (When I saw the patient): 01/15/23    Assessment & Plan   Tc Garcia is a 82 year old male who was admitted on 1/15/2023. I was asked to see the patient for IVH. He is on apixaban for afib, reversed with kcentra.   Seen in ED. Alert and oriented except thought it was December 15 today, following all commands. No drift noted. States headache is improved. No visual changes, no dizziness, no n/v.      Principal Problem:    Nontraumatic intraventricular intracerebral hemorrhage (H)    Assessment: 82M with right intraventricular hemorrhage. He was hypertensive with SBP in 190s on presentation. He is on Apixaban, and kcentra being given, and repeat head CT ordered.   NCC following and have provided recommendations as well.     Plan: non surgical management for now, NSG will continue to follow this patient.   Active Problems:    Essential hypertension      I have discussed the following assessment and plan with Dr. Marcano, who is in agreement with the initial plan and will follow up with further consultation recommendations.    Miguel Angel Lopez PA-C  [facility] Neurosurgery  [place] [address]    Tel [phone]  Pager [phone]        Code Status    Prior    Reason for Consult   Reason for consult: IVH    Primary Care Physician   Jessika Cordoba    Chief Complaint   IVH    History is obtained from the patient, electronic health record and emergency department physician    History of Present Illness   Tc Garcia is a 82 year old male who presents with IVH. He is on apixaban for afib, reversed with kcentra.   Seen in ED. Alert and oriented except thought it was December 15 today, following all commands. No drift noted. States headache is improved. No visual changes, no dizziness, no n/v.      Principal Problem:    Nontraumatic  intraventricular intracerebral hemorrhage (H)    Assessment: 82M with right intraventricular hemorrhage. He was hypertensive with SBP in 190s on presentation. He is on Apixaban, and kcentra being given, and repeat head CT ordered.   NCC following and have provided recommendations as well.     Plan: non surgical management for now, NSG will continue to follow this patient.     Past Medical History   I have reviewed this patient's medical history and updated it with pertinent information if needed.   Past Medical History:   Diagnosis Date     Anemia      Anemia of chronic disease 5/14/2020     Back pain      CKD (chronic kidney disease) stage 3, GFR 30-59 ml/min (H)      CRF (chronic renal failure), stage 3  5/14/2020     Fall 11/2019    suffered multiple left rib fractures, C3 and T2 fractures     Mixed hyperlipidemia 7/7/2004     Paroxysmal atrial fibrillation (H)      Personal history of colonic polyps 1972    gets colonoscopy every five years, due in 2006     Pleural effusion on left 11/2019    after multiple rib fractures     Pulmonary hypertension (H) 5/10/2020    Added automatically from request for surgery [id number]     Recurrent Left Pleural effusion -- S/P Pleurex Cath 5/12/20 12/30/2019     Rosacea 1989     Type II or unspecified type diabetes mellitus without mention of complication, not stated as uncontrolled 1999     Unspecified essential hypertension 1994       Past Surgical History   I have reviewed this patient's surgical history and updated it with pertinent information if needed.  Past Surgical History:   Procedure Laterality Date     ANESTHESIA CARDIOVERSION N/A 2/3/2020    Procedure: ANESTHESIA, FOR CARDIOVERSION;  Surgeon: GENERIC ANESTHESIA PROVIDER;  Location:  OR      RIGHT HEART CATH MEASUREMENTS RECORDED N/A 5/13/2020    Procedure: Right Heart Cath;  Surgeon: Senthil Silva MD;  Location:  HEART CARDIAC CATH LAB     HC REMOVE TONSILS/ADENOIDS,<11 Y/O      T & A <12y.o.     IR  CHEST TUBE DRAIN TUNNELED LEFT  2020     IR CHEST TUBE PLACEMENT NON-TUNNELLED LEFT  2020     IR CHEST TUBE REMOVAL NON TUNNELED LEFT  2020     IR CHEST TUBE REMOVAL TUNNELED LEFT  2020     LAPAROSCOPIC CHOLECYSTECTOMY N/A 2020    Procedure: CHOLECYSTECTOMY, LAPAROSCOPIC;  Surgeon: Annette Lambert MD;  Location:  OR     LAPAROSCOPIC HERNIORRHAPHY INGUINAL BILATERAL Bilateral 10/27/2020    Procedure: LAPAROSCOPIC BILATERAL INGUINAL HERNIA REPAIR WITH MESH;  Surgeon: Brian Garsia MD;  Location:  OR       Prior to Admission Medications   Prior to Admission Medications   Prescriptions Last Dose Informant Patient Reported? Taking?   HUMALOG 100 UNIT/ML injection   Yes No   HYDROcodone-acetaminophen (NORCO) 5-325 MG tablet   Yes No   Sig: Take 1 tablet by mouth every 6 hours as needed for severe pain   Patient not taking: No sig reported   LOVASTATIN 20 MG PO TABS  Self No No   Si TABLET DAILY AT DINNER   apixaban ANTICOAGULANT (ELIQUIS) 5 MG tablet   No No   Sig: Take 1/2 tablet(2.5mg) by mouth twice daily   carvedilol (COREG) 6.25 MG tablet   No No   Sig: Take 1 tablet (6.25 mg) by mouth 2 times daily (with meals)   furosemide (LASIX) 40 MG tablet   No No   Sig: Take 1 1/2 tablets(60mg) by mouth every day   glucagon 1 MG kit  Self Yes No   Si mg as needed for low blood sugar   irbesartan (AVAPRO) 150 MG tablet   No No   Sig: Take 1 tablet (150 mg) by mouth At Bedtime   irbesartan (AVAPRO) 300 MG tablet   No No   Sig: Take 1 tablet (300 mg) by mouth At Bedtime   nystatin (MYCOSTATIN) 550500 UNIT/GM external cream  Self Yes No   Sig: Apply topically 2 times daily as needed    olopatadine (PATANOL) 0.1 % ophthalmic solution   Yes No   Si drop 2 times daily      Facility-Administered Medications: None     Allergies   Allergies   Allergen Reactions     No Known Drug Allergies        Social History   I have reviewed this patient's social history and updated it with  pertinent information if needed. Tc Garcia  reports that he quit smoking about 15 years ago. His smoking use included cigarettes. He started smoking about 55 years ago. He has a 8.00 pack-year smoking history. He has never used smokeless tobacco. He reports that he does not currently use alcohol after a past usage of about 35.0 standard drinks per week. He reports that he does not currently use drugs.    Family History   I have reviewed this patient's family history and updated it with pertinent information if needed.   Family History   Problem Relation Age of Onset     Family History Negative Mother          age 71     Family History Negative Father          age 70     Diabetes Brother         alive age 77     Diabetes Brother         alive age 76     Family History Negative Brother              Family History Negative Brother              Diabetes Sister         alive age76     Family History Negative Sister          age 86     Heart Disease Sister              Family History Negative Sister              Family History Negative Sister              Diabetes Sister      Diabetes Sister      Diabetes Brother      Diabetes Brother        Review of Systems   10 point review of systems is negative with the exception of HPI and PMH.       Physical Exam   Temp: 98.5  F (36.9  C)   BP: 127/61 Pulse: 85   Resp: 16 SpO2: 100 % O2 Device: None (Room air)    Vital Signs with Ranges  Temp:  [98.5  F (36.9  C)] 98.5  F (36.9  C)  Pulse:  [63-86] 85  Resp:  [12-30] 16  BP: (108-213)/() 127/61  SpO2:  [90 %-100 %] 100 %  158 lbs 0 oz     , Blood pressure 127/61, pulse 85, temperature 98.5  F (36.9  C), resp. rate 16, height 1.524 m (5'), weight 71.7 kg (158 lb), SpO2 100 %.  158 lbs 0 oz  HEENT:  Normocephalic, atraumatic.  PERRLA.  EOM s intact.   Neck:  Supple, non-tender, without lymphadenopathy.  Heart:  No peripheral edema  Lungs:  No SOB  Abdomen:   Soft, non-tender, non-distended.  Skin:  Warm and dry, good capillary refill.  Extremities:  Good radial and dorsalis pedis pulses bilaterally, no edema, cyanosis or clubbing.    NEUROLOGICAL EXAMINATION:   Mental status:  Alert and Oriented x 3, speech is fluent.  Cranial nerves:  II-XII intact.   Motor:  Strength is 5/5 throughout the upper and lower extremities  Sensation:  intact  Reflexes:   Negative Babinski.  Negative Clonus.    Coordination:  Smooth finger to nose testing.   Negative pronator drift.      Data   All new lab and imaging data was personally reviewed by me.    CT:EXAM: CT HEAD W/O CONTRAST  LOCATION: [facility]  DATE/TIME: 1/15/2023 3:08 PM     INDICATION: altered mental status  COMPARISON: 03/17/2021  TECHNIQUE: Routine CT Head without IV contrast. Multiplanar reformats. Dose reduction techniques were used.     FINDINGS:  INTRACRANIAL CONTENTS: Large, isolated intraventricular hemorrhage within the right lateral ventricle involving the atria, temporal horn and occipital horn. No additional intracranial blood products are noted elsewhere. No CT evidence of acute infarct.   Moderate presumed chronic small vessel ischemic changes. Moderate generalized volume loss. No hydrocephalus. Minimally dilated right occipital horn. Ventricular size and morphology is otherwise unchanged.     VISUALIZED ORBITS/SINUSES/MASTOIDS: No intraorbital abnormality. No paranasal sinus mucosal disease. No middle ear or mastoid effusion.     BONES/SOFT TISSUES: No acute abnormality.                                                                      IMPRESSION:  1.  Large isolated intraventricular hemorrhage of the right lateral ventricle. A CT angiogram is recommended for further evaluation to assess for potential vascular malformation. Pending angiography findings, an MRI may be indicated.  2.  Minimal interval increase in size of the occipital horn of the right lateral ventricle. Ventricular  size and morphology is otherwise no change.  3.  Probable moderate sequela of chronic small vessel ischemic disease.     These findings were discussed with Dr. Hamilton at 1:33 PM on 01/15/2023.    MRI:    CBC RESULTS:   Recent Labs   Lab Test 01/15/23  1321   WBC 10.7   RBC 5.17   HGB 13.9   HCT 44.7   MCV 87   MCH 26.9   MCHC 31.1*   RDW 14.4        Basic Metabolic Panel:  Lab Results   Component Value Date     01/15/2023     07/12/2021      Lab Results   Component Value Date    POTASSIUM 3.8 01/15/2023    POTASSIUM 4.0 07/12/2021     Lab Results   Component Value Date    CHLORIDE 102 01/15/2023    CHLORIDE 102 11/04/2021    CHLORIDE 100 07/12/2021     Lab Results   Component Value Date    LLOYD 9.4 01/15/2023    LLOYD 9.3 07/12/2021     Lab Results   Component Value Date    CO2 29 01/15/2023    CO2 26 07/12/2021     Lab Results   Component Value Date    BUN 14 01/15/2023    BUN 18 07/12/2021    BUN 10 07/12/2021     Lab Results   Component Value Date    CR 1.48 01/15/2023    CR 1.73 07/12/2021     Lab Results   Component Value Date     01/15/2023     07/12/2021     INR:  Lab Results   Component Value Date    INR 1.13 01/15/2023    INR 1.50 11/21/2020    INR 1.18 07/17/2020    INR 1.49 05/10/2020    INR 1.88 04/16/2020    INR 1.28 02/26/2020    INR 1.65 12/31/2019    INR 1.44 11/07/2019    INR 1.37 10/31/2019    INR 1.40 10/30/2019

## 2023-01-16 NOTE — PROGRESS NOTES
Took the patient to MRI. He became distressed with the noise from the machine. MRI tech and I readjusted him and assured the patient that the noise was normal. Ear plugs were given in addition to the music through the headphones. The patient became more distressed and combative during the second attempt to get the MRI of his head. He was flailing on the MRI table and trying to stand. It took 4 staff to get him safely onto the transport cart and calmed down. He was returned to his room and is very pleasant again.   Messaged FORTUNATO Augustin for neuro-critical care group and notified her. Per her the plan will be to retry MRI later in the patient's stay and give him something to help calm him before hand.   Wilfrid Pedroza RN 1:13 PM 01/16/23

## 2023-01-16 NOTE — ED NOTES
RN assumed care of pt for art line. Pt alert to voice, disoriented to time/situation. Moves all four extremities independently, and redirectable in ED. BP stable at 120s, at 7.5mg/hr on Nicardipine. Pt extremely restless, pt reports nausea, pt reports mild headache.MD Hamilton updated.  AFIB on continuous cardiac monitoring, on room air, O2 saturation stable at 94%. Per pervious RN nurse to nurse called to ICU.

## 2023-01-16 NOTE — CONSULTS
"      [facility]    Stroke Telephone Note    I was called by León Hamilton on 01/15/23 regarding patient Tc Garcia.    Tc Garcia is a 83 YO M w/vascular RFs: pAfib on apixaban, HTN on multiple meds, HLD on statin, IDDM2, CKD who presents oon 1/15 with recurrent episode of amnesia, encephalopathy.     He has had ongoing, intermittent amnestic episodes and encephalopathy for days.     In ED exam reported to be non-focal, GCX 15.     In ED /120. Initial labs with SrCr 1.48.     NCCT: Primary large R lateral ventricle IVH, mild signs of associated hydrocephalus  CTA H/N: Hyperdensity in bleed correlates to choroid plexus seen on NCCT.     Impression/Recommendations  # Primary spontaneous R IVH in setting of apixaban, HTN. ICH score: 2. Suspect pt has had ICH for days given HPI. Hyperdensity seen in ventricular blood correlates to choroid plexus seen on NCCT. The most common cause of IVH is typically ischemic stroke with hemorrhagic conversion/extravastation into ventricles or hypertensive IVH, however, macrovascular etiologies are most common with IVH.   - Rec ICU admission with Q1H neurochecks overnight  - Agree with apixaban reversal with kcentra  - Agree with nicard gtt with SBP goal < 140 given concerns for unsecured vascular lesion  - Rec repeat NCCT stability scan in 4 hours given pt was on apixaban  - MRI brain w/wo non-urgently to eval for macrovascular etiologies  - If above workup negative, would strongly consider DSA for further workup    My recommendations are based on the information provided over the phone by Tc Garcia's in-person providers. They are not intended to replace the clinical judgment of his in-person providers. I was not requested to personally see or examine the patient at this time.    The Stroke Staff is Dr. Ruiz.    Beau Baron MD  Vascular Neurology Fellow    To page me or covering stroke neurology team member, click here: AMCOM  Choose \"On " "Call\" tab at top, then select \"NEUROLOGY/ALL SITES\" from middle drop-down box, press Enter, then look for \"stroke\" or \"telestroke\" for your site.      "

## 2023-01-16 NOTE — PHARMACY-ADMISSION MEDICATION HISTORY
Pharmacy Medication History  Admission medication history interview status for the 1/15/2023 admission is complete. See EPIC admission navigator for prior to admission medications.     Location of Interview: Patient room  Medication history sources: Patient and Surescripts    Significant changes made to the medication list:  - added cyclobenzaprine 10 mg at bedtime PRN  - pt reports taking carvedilol as 12.5 mg BID (despite directions seen on SureScripts refill history as 12.5 mg once daily)   - added Humalog insulin per insulin pump settings   - pt reports taking both 150 mg and 300 mg irbesartan once daily at bedtime    In the past week, patient estimated taking medication this percent of the time: greater than 90%    Additional medication history information:   - pt confirmed no longer taking losartan (switched to irbesartan), methocarbamol, verapamil ER, or spironolactone    Medication reconciliation completed by provider prior to medication history? No    Time spent in this activity: 25 minutes    Prior to Admission medications    Medication Sig Last Dose Taking? Auth Provider Long Term End Date   apixaban ANTICOAGULANT (ELIQUIS) 5 MG tablet Take 1/2 tablet(2.5mg) by mouth twice daily 1/15/2023 at AM Yes Ame Mejía PA-C No    carvedilol (COREG) 6.25 MG tablet Take 1 tablet (6.25 mg) by mouth 2 times daily (with meals)  Patient taking differently: Take 12.5 mg by mouth 2 times daily (with meals) 1/15/2023 at AM Yes Cony Julien, DO Yes    cyclobenzaprine (FLEXERIL) 10 MG tablet Take 10 mg by mouth nightly as needed for muscle spasms 1/14/2023 at HS Yes Unknown, Entered By History     furosemide (LASIX) 40 MG tablet Take 1 1/2 tablets(60mg) by mouth every day 1/15/2023 at AM Yes Cony Julien DO Yes    HUMALOG 100 UNIT/ML injection [employer] device with no CGM and site change every 3 days   Sensitivity factor (midnight to midnight): 55  Bolus (units:gram): 8106-5261 = 1:9,  3793-1033 = 1:7, 9266-9909 = 1:7, 3364-7516 = 1:9, 6162-1954 = 1:13  Basal (units/hour): 6118-3196 = 0.45, 1181-6475 = 0.5, 6113-0993 = 0.625, 3998-5519 = 0.6, 2118-4385 = 0.45  Yes Reported, Patient     irbesartan (AVAPRO) 150 MG tablet Take 1 tablet (150 mg) by mouth At Bedtime  Patient taking differently: Take 150 mg by mouth At Bedtime in addition to 300 mg tablet 1/14/2023 at HS Yes Cony Julien, DO Yes    irbesartan (AVAPRO) 300 MG tablet Take 1 tablet (300 mg) by mouth At Bedtime  Patient taking differently: Take 300 mg by mouth At Bedtime in addition to 150 mg tablet 1/14/2023 at HS Yes Cony Julien, DO Yes    nystatin (MYCOSTATIN) 005050 UNIT/GM external cream Apply topically 2 times daily as needed  PRN at PRN Yes Unknown, Entered By History     glucagon 1 MG kit 1 mg as needed for low blood sugar   Unknown, Entered By History         The information provided in this note is only as accurate as the sources available at the time of update(s)

## 2023-01-16 NOTE — PROGRESS NOTES
[facility]    Hospitalist Progress Note         Assessment & Plan   Tc Garcia, 82 year old male with PMH of stage III chronic kidney disease, anemia, paroxysmal atrial fibrillation on anticoagulation, pulmonary hypertension, essential hypertension, type 2 diabetes mellitus on insulin pump presents with increasing weakness, dizziness and lightheadedness with new intraventricular hemorrhage.  ICU admission.       Intraventricular hemorrhage, right lateral ventricle  Essential hypertension, uncontrolled on admission  History of atrial fibrillation, on apixaban anticoagulation prior to admission  New intraventricular hemorrhage, right-sided, potentially severe and life-threatening.  ICU admission.  Chronic anticoagulation prior to admission, apixaban for atrial fibrillation.  Patient reports history of multiple falls including prior to this admission, no known hit to head, patient states general imbalance with turning. Anticoagulation reversed in ER.  Neurosurgery and stroke neurology consulted.    ICU admission    Stroke Neurology; see note, nicardipine drip, repeat head CT, MRI.Stroke Neurology to review/manage    Neurosurgery consult, see note, no immediate surgical intervention, monitor    IV nicardipine, titrate for BP control per Stroke Neuro recommendations with systolic blood pressure goal less than 140    Hold preadmission carvedilol, irbesartan, and furosemide taken prior to admission, reassess daily;     Reversal of anticoagulation taken prior to admission (apixaban/Eliquis); KCentra given in ER, after acute symptoms resolved, needs decision regarding risk/benefits of ongoing AC for A. fib given history of multiple falls and presentation with IVH.    Neuro checks every hour overnight, as per stroke neurology    Telemetry    Pain control; Tylenol as needed; avoid NSAID's due to bleeding; consider opiates if severe, uncontrolled pain; monitor for side effects    AM labs as ordered  w/ CBC, basic profile    Nursing reports mild restlessness, soft nonviolent restraints as needed, redirection/reassurance.     Type 2 diabetes mellitus, insulin-dependent, insulin pump    Monitor blood sugars, continue PTA insulin pump, discontinue all other insulin Rx.      Clear liquid diet, diabetic when fully advanced     Abnormal admission chest x-ray, patchy opacity lung bases  Indeterminate chest x-ray on admission, small patchy opacity lung bases, artifact versus pneumonia versus mass.  Denies cough, sputum, dyspnea.  Denies aspiration.    Consider follow-up CT chest.     Chronic kidney disease, stage III    Monitor serum Cr and urine output; admission Cr 1.48    In a.m. increased to 1.51.  Avoid nephrotoxins.  Repeat BMP in AM.    Mild leukocytosis  WBC 15.7 today, on admission 10.7.  Afebrile.  No pyuria on UA.  Chest CT to follow-up CXR.  Procalcitonin 0.20, hold off empiric antibiotics.  Monitor temp profile, repeat WBC in AM.    DVT Prophylaxis: Pneumatic Compression Devices  Code Status: No CPR- Do NOT Intubate    Expected discharge >2days pending clinical improvement, currently in ICU, Stroke Neurology plan established, safe disposition    Pranav Ambrocio MD  [employer] Page (7am - 6pm, M-F)    Total time 50 minutes for today 1/16/2023 :  time consisted of the following, assumed care in ICU: examination of patient, review of records including labs, imaging results, medications, interdisciplinary notes and completing documentation and orders.  Care Management included counseling/discussion with Patient regarding current condition including IVH, hx of multiple falls/AC and Coordination of Care time with Nursing  and Specialists, Stroke neurology and Neurosurgery regarding IVH care plan, management and surveillance.     Interval History   Denies headache, nursing notes no neurologic focality.  Patient does report a history of multiple falls PTA including prior to this admission, reports general imbalance.   PTA apixaban, now on hold.  PT assessment pending stabilization current issues.  Mild restlessness, last evening required soft two-point wrist restraints.  Continue reassurance/redirection.  No encephalopathy.    SH: No tobacco.  Lives with wife.    ROS: Complete ROS negative except as above.     -Data reviewed today: I reviewed all new labs and imaging results over the last 24 hours..    Physical Exam   Temp: 97.5  F (36.4  C) Temp src: Oral BP: 114/73 Pulse: 83   Resp: 26 SpO2: 97 %      Vitals:    01/15/23 1316 01/15/23 1954 01/15/23 2121   Weight: 71.2 kg (157 lb) 71.7 kg (158 lb) 66.8 kg (147 lb 4.3 oz)     Vital Signs with Ranges  Temp:  [97.5  F (36.4  C)-100  F (37.8  C)] 97.5  F (36.4  C)  Pulse:  [] 83  Resp:  [0-40] 26  BP: ()/() 114/73  MAP:  [67 mmHg-105 mmHg] 68 mmHg  Arterial Line BP: ()/(33-75) 118/45  SpO2:  [90 %-100 %] 97 %  I/O last 3 completed shifts:  In: 250.42 [I.V.:250.42]  Out: 500 [Urine:500]    General/Constitutional:  NAD, awake, calm, cooperative     HEENT/Head Exam:  atraumatic  Eyes:  PERRL, no conjunctivits  Mouth/Oral Pharynx:  Buccal mucosa WNL  Chest/Respiratory: Respirations nonlabored room air  Cardiovascular:  no murmur appreciated.  LE edema trace  Gastrointestinal/Abdomen:  soft, nontender  Neurologic:  gross CN and motor testing nonfocal.    Psychiatric:  Mental status:  Alert, oriented x 3, affect calm      Medications     dextrose       insulin regular 4 Units/hr (01/16/23 1407)     niCARdipine 2.5 mg/hr (01/16/23 1044)         Data   Recent Labs   Lab 01/16/23  1406 01/16/23  1254 01/16/23  1133 01/16/23  0905 01/16/23  0448 01/15/23  2141 01/15/23  1321   WBC  --   --   --   --  15.7*  --  10.7   HGB  --   --   --   --  12.1*  --  13.9   MCV  --   --   --   --  84  --  87   PLT  --   --   --   --  215  --  275   INR  --   --   --   --   --   --  1.13   NA  --   --   --   --  140  --  138   POTASSIUM  --   --   --   --  4.1  --  3.8   CHLORIDE  --    --   --   --  103  --  102   CO2  --   --   --   --  22  --  29   BUN  --   --   --   --  26  --  14   CR  --   --   --   --  1.51*  --  1.48*   ANIONGAP  --   --   --   --  15*  --  7   LLOYD  --   --   --   --  9.2  --  9.4   * 298* 368*   < > 431*   < > 244*   ALBUMIN  --   --   --   --   --   --  4.4   PROTTOTAL  --   --   --   --   --   --  7.1   BILITOTAL  --   --   --   --   --   --  1.6*   ALKPHOS  --   --   --   --   --   --  76   ALT  --   --   --   --   --   --  11   AST  --   --   --   --   --   --  11    < > = values in this interval not displayed.       Recent Results (from the past 24 hour(s))   XR Chest 2 Views    Narrative    EXAM: XR CHEST 2 VIEWS  LOCATION: [facility]  DATE/TIME: 1/15/2023 3:02 PM    INDICATION: gen weakness  COMPARISON: 12/15/2020      Impression    IMPRESSION: Small patchy opacity in the lung bases best seen on the lateral view is new, and could be due to superimposed soft tissue shadows, pneumonia or mass. Consider a follow-up chest CT for better evaluation. Stable linear opacities in the left   midlung and lung base, likely related to chronic fibrosis. Stable left costophrenic space blunting, likely related to chronic pleural thickening. No right pleural effusion or pneumothorax bilaterally. Stable mild cardiomegaly and enlarged main pulmonary   artery. Old healed right lateral rib fracture and mid sternal fracture deformities.   CT Head w/o Contrast    Narrative    EXAM: CT HEAD W/O CONTRAST  LOCATION: [facility]  DATE/TIME: 1/15/2023 3:08 PM    INDICATION: altered mental status  COMPARISON: 03/17/2021  TECHNIQUE: Routine CT Head without IV contrast. Multiplanar reformats. Dose reduction techniques were used.    FINDINGS:  INTRACRANIAL CONTENTS: Large, isolated intraventricular hemorrhage within the right lateral ventricle involving the atria, temporal horn and occipital horn. No additional intracranial blood products  are noted elsewhere. No CT evidence of acute infarct.   Moderate presumed chronic small vessel ischemic changes. Moderate generalized volume loss. No hydrocephalus. Minimally dilated right occipital horn. Ventricular size and morphology is otherwise unchanged.    VISUALIZED ORBITS/SINUSES/MASTOIDS: No intraorbital abnormality. No paranasal sinus mucosal disease. No middle ear or mastoid effusion.    BONES/SOFT TISSUES: No acute abnormality.      Impression    IMPRESSION:  1.  Large isolated intraventricular hemorrhage of the right lateral ventricle. A CT angiogram is recommended for further evaluation to assess for potential vascular malformation. Pending angiography findings, an MRI may be indicated.  2.  Minimal interval increase in size of the occipital horn of the right lateral ventricle. Ventricular size and morphology is otherwise no change.  3.  Probable moderate sequela of chronic small vessel ischemic disease.    These findings were discussed with Dr. Hamilton at 1:33 PM on 01/15/2023.   CTA Head with Contrast    Narrative    EXAM: CTA HEAD WITH CONTRAST  LOCATION: [facility]  DATE/TIME: 1/15/2023 4:39 PM    INDICATION: large intraventricular hemorrhage  COMPARISON: CT head 01/15/2023 2:49 PM  CONTRAST: 75mL Isovue 370  TECHNIQUE: Axial helical CT images of the intracranial vessels obtained during the arterial phase of intravenous contrast administration. Axial helical 2D reconstructed images and multiplanar 3D MIP reconstructed images of the intracranial vessels were   performed by the technologist. Dose reduction techniques were used.     FINDINGS:   ANTERIOR CIRCULATION: Grossly similar intraventricular hemorrhage given differences in technique. No evidence of active extravasation. No stenosis/occlusion, aneurysm, or high flow vascular malformation. There is nonstenotic atherosclerotic calcification   of the bilateral carotid siphons. Standard [place]  anatomy.    POSTERIOR CIRCULATION: There is focal short signal moderate stenosis of the bilateral P1 segment posterior cerebral artery. No occlusion, aneurysm, or high flow vascular malformation. Balanced vertebral arteries supply a normal basilar artery.   Administration of the proximal basilar artery is incidentally noted.    DURAL VENOUS SINUSES: Not well evaluated on a technical basis.        Impression    IMPRESSION:   1.  Negative for intracranial aneurysm or AVM.  2.  Grossly similar intraventricular hemorrhage without evidence of active extravasation.  3.  Focal moderate stenosis of the bilateral P1 posterior cerebral artery.   CT Head w/o Contrast    Narrative    EXAM: CT HEAD W/O CONTRAST  LOCATION: [facility]  DATE/TIME: 1/15/2023 7:51 PM    INDICATION: rpt eval of R IVH  COMPARISON: 01/15/2023  TECHNIQUE: Routine CT Head without IV contrast. Multiplanar reformats. Dose reduction techniques were used.    FINDINGS:  INTRACRANIAL CONTENTS: Large volume ascites if intraventricular hemorrhage of the right lateral ventricle has not changed No CT evidence of acute infarct. Moderate presumed chronic small vessel ischemic changes. Ventriculomegaly disproportionate to the   degree of volume loss. Correlate for normal pressure hydrocephalus. Ventricular size and morphology is unchanged. Unchanged size and appearance of the occipital and temporal horns of the right lateral ventricle.    VISUALIZED ORBITS/SINUSES/MASTOIDS: No intraorbital abnormality. No paranasal sinus mucosal disease. No middle ear or mastoid effusion.    BONES/SOFT TISSUES: No acute abnormality.      Impression    IMPRESSION:  1.  Unchanged isolated intraventricular hemorrhage in the right lateral ventricle.  2.  Stable ventricular size and morphology.  3.  Probable moderate sequela of chronic small vessel ischemic disease and brain parenchymal volume loss.   CT Head w/o Contrast    Narrative    CT OF THE HEAD WITHOUT  CONTRAST 1/16/2023 8:56 AM     COMPARISON: Head CT 1/15/2023.    HISTORY: Follow-up intraventricular hemorrhage.    TECHNIQUE: 5 mm thick axial CT images of the head were acquired  without IV contrast material.    FINDINGS: Intraventricular hemorrhage in the posterior body, atrium  and temporal horn of the right lateral ventricle again noted without  significant change. There is new minimal layering hemorrhage in the  occipital horn of the left lateral ventricle that may simply represent  redistribution of hemorrhage throughout the ventricular system. No  definite evidence for new or increasing intracranial hemorrhage.     There is moderate diffuse cerebral volume loss. There are extensive  confluent areas of decreased density in the cerebral white matter  bilaterally that are consistent with sequela of chronic small vessel  ischemic disease. The basal cisterns are within normal limits in  configuration given the degree of cerebral volume loss.  There is no  midline shift. There are no extra-axial fluid collections.    No intracranial mass or recent infarct.    The visualized paranasal sinuses are well-aerated. There is no  mastoiditis. There are no fractures of the visualized bones.      Impression    IMPRESSION:   1. Stable intraventricular hemorrhage in the right lateral ventricle  with probable redistribution of a small amount of hemorrhage into the  occipital horn of the left lateral ventricle. No definite evidence for  new or increasing hemorrhage.  2. Diffuse cerebral volume loss and cerebral white matter changes  consistent with chronic small vessel ischemic disease.    Radiation dose for this scan was reduced using automated exposure  control, adjustment of the mA and/or kV according to patient size, or  iterative reconstruction technique.      RICHMOND SINGH MD         SYSTEM ID:  [id number]    }

## 2023-01-16 NOTE — H&P
[facility]    History and Physical  Hospitalist       Date of Admission:  1/15/2023    Assessment & Plan   Tc Garcia, 82 year old male with PMH of stage III chronic kidney disease, anemia, paroxysmal atrial fibrillation on anticoagulation, pulmonary hypertension, essential hypertension, type 2 diabetes mellitus on insulin pump.  Presents with increasing weakness, dizziness and lightheadedness with new intraventricular hemorrhage.  ICU admission.    Intraventricular hemorrhage, right lateral ventricle  Essential hypertension, uncontrolled on admission  History of atrial fibrillation, on apixaban anticoagulation prior to admission  New intraventricular hemorrhage, right-sided, potentially severe and life-threatening.  ICU admission.  Chronic anticoagulation prior to admission, apixaban for atrial fibrillation.  Anticoagulation reversed in ER.  Neurosurgery and stroke neurology consulted.    ICU admission    Stroke Neurology consulted    Neurosurgery consulted    IV nicardipine, titrate for BP control per Stroke Neuro recommendations with systolic blood pressure goal less than 140    Hold preadmission carvedilol, irbesartan, and furosemide taken prior to admission, reassess daily; Stroke Neurology to review/manage    Reversal of anticoagulation taken prior to admission (apixaban/Eliquis); KCentra given in ER, Stroke Neuro to review/manage    Follow-up head CT    Neuro checks every hour overnight, as per stroke neurology    Telemetry    Stroke neurology planning brain MRI to evaluate for macrovascular etiologies    Pain control; Tylenol as needed; avoid NSAID's due to bleeding; consider opiates if severe, uncontrolled pain; monitor for side effects    NPO for now, reassess diet daily; consider IV fluids    Pharmacy consultation for medication review and reconciliation; AM rounding provider to please review    AM labs as ordered w/ CBC, basic profile    Type 2 diabetes mellitus,  insulin-dependent, insulin pump    Monitor blood sugars, continue insulin pump, consider transition to subcutaneous regimen    Clear liquid diet, diabetic when fully advanced    Abnormal admission chest x-ray, patchy opacity lung bases  Indeterminate chest x-ray on admission, small patchy opacity lung bases, artifact versus pneumonia versus mass.    Consider follow-up CT chest.  Rounding provider to please review.    Chronic kidney disease, stage III    Monitor serum Cr and urine output; admission Cr 1.48    AM basic profile    Asymptomatic COVID-19    DNR, DNI code status    Confirmed with patient and family at the bedside on admission in ER    Disposition    Estimated length of stay 5 to 7 days    Anticipated discharge to home vs TCU    Admit to hospital inpatient; new problem this admission, high risk medical condition, potentially severe and life-threatening; multiple medical problems with potential for worsening; high complexity patient with additional workup and treatment planned.    In the past 24 hours clinical management discussed with ER provider and Neurosurgery team;  Stroke Neurology consulted; medical records reviewed online in Epic; tests ordered and reviewed; images/EKG(s) reviewed personally and interpreted; additional historian(s) interviewed including wife and daugher; medical complexity of management is high.    Patient discussed with ER provider, Dr. León Hamilton    Change in hospitalist provider tomorrow with one of my [facility] hospitalist colleagues assuming care.    Clinically Significant Risk Factors Present on Admission               # Drug Induced Coagulation Defect: home medication list includes an anticoagulant medication    # Hypertension: home medication list includes antihypertensive(s)      # Obesity: Estimated body mass index is 30.86 kg/m  as calculated from the following:    Height as of this encounter: 1.524 m (5').    Weight as of this encounter: 71.7 kg (158  lb).           DVT Prophylaxis: Pneumatic Compression Devices  Code Status: DNR / DNI    Edmond Moya MD [facility]    Primary Care Physician   Jessika Cordoba    Chief Complaint   Weakness, confusion, headache, new intraventricular hemorrhage on CT head    History is obtained from the patient, his wife, and family, in addition to chart review.    History of Present Illness   Tc Garcia, 82 year old male with PMH of stage III chronic kidney disease, anemia, paroxysmal atrial fibrillation on anticoagulation, pulmonary hypertension, essential hypertension, type 2 diabetes mellitus on insulin pump.  Presents with increasing weakness, dizziness and lightheadedness.  Recent near falls with gait instability.  Apparently slipped in the bathtub nearly a week ago denying head trauma.  Increased confusion reported by patient's wife and family members.  Decreased oral intake and reduced sleep.  No report of chest pain, nausea, vomiting, or shortness of breath.  No fever, chills, or sweats.  Headache reported, top of the head, sharp, up to 8 out of 10 in intensity.    In the emergency room, elevated blood pressure.  Elevated serum creatinine, 1.48, with glucose 244.  WBC 10,700, hemoglobin 13.9, platelets 275,000.  CT scan of the head, noncontrast showed large isolated intraventricular hemorrhage of the right lateral ventricle.  See report.  CTA head negative for intracranial aneurysm or AVM.  Intraventricular hemorrhage again identified without evidence of active extravasation.  Follow-up head CT scan without contrast showed unchanged isolated intraventricular hemorrhage in the right lateral ventricle, see report.  Neurosurgery and Stroke Neurology consulted emergency room with hospitalization to ICU.    PAST MEDICAL HISTORY  Past Medical History:   Diagnosis Date     Anemia      Anemia of chronic disease 5/14/2020     Back pain      CKD (chronic kidney disease) stage 3, GFR 30-59 ml/min (H)      CRF (chronic renal  failure), stage 3  5/14/2020     Fall 11/2019    suffered multiple left rib fractures, C3 and T2 fractures     Mixed hyperlipidemia 7/7/2004     Paroxysmal atrial fibrillation (H)      Personal history of colonic polyps 1972    gets colonoscopy every five years, due in 2006     Pleural effusion on left 11/2019    after multiple rib fractures     Pulmonary hypertension (H) 5/10/2020    Added automatically from request for surgery [id number]     Recurrent Left Pleural effusion -- S/P Pleurex Cath 5/12/20 12/30/2019     Rosacea 1989     Type II or unspecified type diabetes mellitus without mention of complication, not stated as uncontrolled 1999     Unspecified essential hypertension 1994       PAST SURGICAL HISTORY  Past Surgical History:   Procedure Laterality Date     ANESTHESIA CARDIOVERSION N/A 2/3/2020    Procedure: ANESTHESIA, FOR CARDIOVERSION;  Surgeon: GENERIC ANESTHESIA PROVIDER;  Location:  OR      RIGHT HEART CATH MEASUREMENTS RECORDED N/A 5/13/2020    Procedure: Right Heart Cath;  Surgeon: Senthil Silva MD;  Location:  HEART CARDIAC CATH LAB     HC REMOVE TONSILS/ADENOIDS,<11 Y/O      T & A <11y.o.     IR CHEST TUBE DRAIN TUNNELED LEFT  5/12/2020     IR CHEST TUBE PLACEMENT NON-TUNNELLED LEFT  7/11/2020     IR CHEST TUBE REMOVAL NON TUNNELED LEFT  7/17/2020     IR CHEST TUBE REMOVAL TUNNELED LEFT  7/11/2020     LAPAROSCOPIC CHOLECYSTECTOMY N/A 11/22/2020    Procedure: CHOLECYSTECTOMY, LAPAROSCOPIC;  Surgeon: Annette Lambert MD;  Location:  OR     LAPAROSCOPIC HERNIORRHAPHY INGUINAL BILATERAL Bilateral 10/27/2020    Procedure: LAPAROSCOPIC BILATERAL INGUINAL HERNIA REPAIR WITH MESH;  Surgeon: Brian Garsia MD;  Location:  OR       HOME MEDICATIONS  Prior to Admission medications    Medication Sig Last Dose Taking? Auth Provider Long Term End Date   apixaban ANTICOAGULANT (ELIQUIS) 5 MG tablet Take 1/2 tablet(2.5mg) by mouth twice daily   Ame Mejía PA-C No     carvedilol (COREG) 6.25 MG tablet Take 1 tablet (6.25 mg) by mouth 2 times daily (with meals)   Cony Julien, DO Yes    furosemide (LASIX) 40 MG tablet Take 1 1/2 tablets(60mg) by mouth every day   Cony Julien, DO Yes    glucagon 1 MG kit 1 mg as needed for low blood sugar   Unknown, Entered By History     HUMALOG 100 UNIT/ML injection    Reported, Patient     HYDROcodone-acetaminophen (NORCO) 5-325 MG tablet Take 1 tablet by mouth every 6 hours as needed for severe pain  Patient not taking: No sig reported   Unknown, Entered By History No    irbesartan (AVAPRO) 150 MG tablet Take 1 tablet (150 mg) by mouth At Bedtime   Cony Julien, DO Yes    irbesartan (AVAPRO) 300 MG tablet Take 1 tablet (300 mg) by mouth At Bedtime   Cony Julien, DO Yes    LOVASTATIN 20 MG PO TABS 1 TABLET DAILY AT DINNER   Tc Palacios MD     nystatin (MYCOSTATIN) 548180 UNIT/GM external cream Apply topically 2 times daily as needed    Unknown, Entered By History     olopatadine (PATANOL) 0.1 % ophthalmic solution 1 drop 2 times daily   Reported, Patient         ALLERGIES  Allergies   Allergen Reactions     No Known Drug Allergies        SOCIAL HISTORY   reports that he quit smoking about 15 years ago. His smoking use included cigarettes. He started smoking about 55 years ago. He has a 8.00 pack-year smoking history. He has never used smokeless tobacco. He reports that he does not currently use alcohol after a past usage of about 35.0 standard drinks per week. He reports that he does not currently use drugs.      FAMILY HISTORY  family history includes Diabetes in his brother, brother, brother, brother, sister, sister, and sister; Family History Negative in his brother, brother, father, mother, sister, sister, and sister; Heart Disease in his sister.    REVIEW OF SYSTEMS  ROS otherwise negative and per HPI above.    Physical Exam   /67   Pulse 84   Temp 98.5  F (36.9  C)    Resp 21   Ht 1.524 m (5')   Wt 71.7 kg (158 lb)   SpO2 99%   BMI 30.86 kg/m     EXAM  GENERAL awake, answering questions and following some commands, lying in bed with multiple family members present at the bedside including his wife, daughter, and son-in-law  HEAD normocephalic and atraumatic  EYES no conjunctival injection or jaundice  ENT oropharynx mildly dry, no lesions  LYMPH no cervical or submandibular or supraclavicular adenopathy  LUNGS mild to moderate inspiratory effort, no wheezes or crackles  HEART S1-S2, regular rhythm, no rubs or gallops  ABDOMEN soft, nontender, no rebound or rigidity, insulin catheter site intact  MUSCULOSKELETAL no gross joint deformities, no significant edema  PULSES dorsalis pedal pulses +1, symmetric  SKIN warm and dry  NEURO moves upper lower extremities spontaneously and to command, perhaps mild left-sided weakness involving left arm and left leg  PSYCHIATRIC answering questions following commands, slow response to questions at times, pleasant and cooperative    Data   Data reviewed today:  I personally reviewed medications, labs, imaging results, and EKG(s) over the last 24 hours    Recent Labs   Lab 01/15/23  1321   WBC 10.7   HGB 13.9   MCV 87      INR 1.13      POTASSIUM 3.8   CHLORIDE 102   CO2 29   BUN 14   CR 1.48*   ANIONGAP 7   LLOYD 9.4   *   ALBUMIN 4.4   PROTTOTAL 7.1   BILITOTAL 1.6*   ALKPHOS 76   ALT 11   AST 11       Imaging:  Recent Results (from the past 24 hour(s))   XR Chest 2 Views    Narrative    EXAM: XR CHEST 2 VIEWS  LOCATION: [facility]  DATE/TIME: 1/15/2023 3:02 PM    INDICATION: gen weakness  COMPARISON: 12/15/2020      Impression    IMPRESSION: Small patchy opacity in the lung bases best seen on the lateral view is new, and could be due to superimposed soft tissue shadows, pneumonia or mass. Consider a follow-up chest CT for better evaluation. Stable linear opacities in the left   midlung and lung  base, likely related to chronic fibrosis. Stable left costophrenic space blunting, likely related to chronic pleural thickening. No right pleural effusion or pneumothorax bilaterally. Stable mild cardiomegaly and enlarged main pulmonary   artery. Old healed right lateral rib fracture and mid sternal fracture deformities.   CT Head w/o Contrast    Narrative    EXAM: CT HEAD W/O CONTRAST  LOCATION: [facility]  DATE/TIME: 1/15/2023 3:08 PM    INDICATION: altered mental status  COMPARISON: 03/17/2021  TECHNIQUE: Routine CT Head without IV contrast. Multiplanar reformats. Dose reduction techniques were used.    FINDINGS:  INTRACRANIAL CONTENTS: Large, isolated intraventricular hemorrhage within the right lateral ventricle involving the atria, temporal horn and occipital horn. No additional intracranial blood products are noted elsewhere. No CT evidence of acute infarct.   Moderate presumed chronic small vessel ischemic changes. Moderate generalized volume loss. No hydrocephalus. Minimally dilated right occipital horn. Ventricular size and morphology is otherwise unchanged.    VISUALIZED ORBITS/SINUSES/MASTOIDS: No intraorbital abnormality. No paranasal sinus mucosal disease. No middle ear or mastoid effusion.    BONES/SOFT TISSUES: No acute abnormality.      Impression    IMPRESSION:  1.  Large isolated intraventricular hemorrhage of the right lateral ventricle. A CT angiogram is recommended for further evaluation to assess for potential vascular malformation. Pending angiography findings, an MRI may be indicated.  2.  Minimal interval increase in size of the occipital horn of the right lateral ventricle. Ventricular size and morphology is otherwise no change.  3.  Probable moderate sequela of chronic small vessel ischemic disease.    These findings were discussed with Dr. Hamilton at 1:33 PM on 01/15/2023.   CTA Head with Contrast    Narrative    EXAM: CTA HEAD WITH CONTRAST  LOCATION: [facility]  DATE/TIME: 1/15/2023 4:39 PM    INDICATION: large intraventricular hemorrhage  COMPARISON: CT head 01/15/2023 2:49 PM  CONTRAST: 75mL Isovue 370  TECHNIQUE: Axial helical CT images of the intracranial vessels obtained during the arterial phase of intravenous contrast administration. Axial helical 2D reconstructed images and multiplanar 3D MIP reconstructed images of the intracranial vessels were   performed by the technologist. Dose reduction techniques were used.     FINDINGS:   ANTERIOR CIRCULATION: Grossly similar intraventricular hemorrhage given differences in technique. No evidence of active extravasation. No stenosis/occlusion, aneurysm, or high flow vascular malformation. There is nonstenotic atherosclerotic calcification   of the bilateral carotid siphons. Standard [place] anatomy.    POSTERIOR CIRCULATION: There is focal short signal moderate stenosis of the bilateral P1 segment posterior cerebral artery. No occlusion, aneurysm, or high flow vascular malformation. Balanced vertebral arteries supply a normal basilar artery.   Administration of the proximal basilar artery is incidentally noted.    DURAL VENOUS SINUSES: Not well evaluated on a technical basis.        Impression    IMPRESSION:   1.  Negative for intracranial aneurysm or AVM.  2.  Grossly similar intraventricular hemorrhage without evidence of active extravasation.  3.  Focal moderate stenosis of the bilateral P1 posterior cerebral artery.   CT Head w/o Contrast    Narrative    EXAM: CT HEAD W/O CONTRAST  LOCATION: [facility]  DATE/TIME: 1/15/2023 7:51 PM    INDICATION: rpt eval of R IVH  COMPARISON: 01/15/2023  TECHNIQUE: Routine CT Head without IV contrast. Multiplanar reformats. Dose reduction techniques were used.    FINDINGS:  INTRACRANIAL CONTENTS: Large volume ascites if intraventricular hemorrhage of the right lateral ventricle has not changed No CT evidence of acute  infarct. Moderate presumed chronic small vessel ischemic changes. Ventriculomegaly disproportionate to the   degree of volume loss. Correlate for normal pressure hydrocephalus. Ventricular size and morphology is unchanged. Unchanged size and appearance of the occipital and temporal horns of the right lateral ventricle.    VISUALIZED ORBITS/SINUSES/MASTOIDS: No intraorbital abnormality. No paranasal sinus mucosal disease. No middle ear or mastoid effusion.    BONES/SOFT TISSUES: No acute abnormality.      Impression    IMPRESSION:  1.  Unchanged isolated intraventricular hemorrhage in the right lateral ventricle.  2.  Stable ventricular size and morphology.  3.  Probable moderate sequela of chronic small vessel ischemic disease and brain parenchymal volume loss.

## 2023-01-16 NOTE — CONSULTS
"      [facility]    Neurocritical Care Consult Note    Reason for Consult:  \"Intraventricular hemorrhage, uncontrolled hypertension, medical management in ICU\"    Chief Complaint: Generalized Weakness and Altered Mental Status       HPI  Tc Garcia is a 82 year old male who presented to the ER yesterday with intermittent amnestic episodes and encephalopathy for days.  BP in the /120. Head CT showed R lateral ventricle IVH, CTA showed no abnormality    Past medical history of Afib on apixaban, HTN on multiple meds, HLD on statin, IDDM2, CKD. He reports his BP had not been well controlled as an outpatient with recent readings as high as 250/120    This morning he feels \"fine\". BP has mostly been in 120-140 range on nicardipine.  He is afebrile, severely hyperglycemia (431) and sodium fine at 140.     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 ICH score: 2. Suspect pt has had ICH for days. S/p reversal of apixaban with Kcentra    Recommendations  - Continue q2 hour neuro checks  - Check MRI brain with and without contrast, consider DSA if unremarkable  - Keep SBP <140. Ok to resume home antihypertensives anytime  - Maintain normothermia, normoglycemia, normonatremia. Recommend insulin drip for goal glucose <180mg/dL  - Consider pt for ASPIRE trial, will discuss with coordinator    Patient Follow-up    - final recommendation pending work-up    Thank you for this consult. We will continue to follow.     Kaya Brenner PA-C  Vascular Neurology    To page me or covering stroke neurology team member, click here: AMCOM  Choose \"On Call\" tab at top, then select \"NEUROLOGY/ALL SITES\" from middle drop-down box, press Enter, then look for \"stroke\" or \"telestroke\" for your site.    _____________________________________________________    Clinically Significant Risk Factors Present on Admission               # Drug Induced Coagulation Defect: home medication list includes " an anticoagulant medication    # Hypertension: home medication list includes antihypertensive(s)      # Overweight: Estimated body mass index is 28.76 kg/m  as calculated from the following:    Height as of this encounter: 1.524 m (5').    Weight as of this encounter: 66.8 kg (147 lb 4.3 oz).        # CKD, Stage 3a (GFR 45-59): Will monitor and treat as appropriate  - last Cr =  1.51 mg/dL (Ref range: 0.66 - 1.25 mg/dL)  - last GFR = 46 mL/min/1.73m2 (Ref range: >60 mL/min/1.73m2)     Past Medical History   Past Medical History:   Diagnosis Date     Anemia      Anemia of chronic disease 5/14/2020     Back pain      CKD (chronic kidney disease) stage 3, GFR 30-59 ml/min (H)      CRF (chronic renal failure), stage 3  5/14/2020     Fall 11/2019    suffered multiple left rib fractures, C3 and T2 fractures     Mixed hyperlipidemia 7/7/2004     Paroxysmal atrial fibrillation (H)      Personal history of colonic polyps 1972    gets colonoscopy every five years, due in 2006     Pleural effusion on left 11/2019    after multiple rib fractures     Pulmonary hypertension (H) 5/10/2020    Added automatically from request for surgery [id number]     Recurrent Left Pleural effusion -- S/P Pleurex Cath 5/12/20 12/30/2019     Rosacea 1989     Type II or unspecified type diabetes mellitus without mention of complication, not stated as uncontrolled 1999     Unspecified essential hypertension 1994     Past Surgical History   Past Surgical History:   Procedure Laterality Date     ANESTHESIA CARDIOVERSION N/A 2/3/2020    Procedure: ANESTHESIA, FOR CARDIOVERSION;  Surgeon: GENERIC ANESTHESIA PROVIDER;  Location:  OR      RIGHT HEART CATH MEASUREMENTS RECORDED N/A 5/13/2020    Procedure: Right Heart Cath;  Surgeon: Senthil Silva MD;  Location:  HEART CARDIAC CATH LAB     HC REMOVE TONSILS/ADENOIDS,<11 Y/O      T & A <12y.o.     IR CHEST TUBE DRAIN TUNNELED LEFT  5/12/2020     IR CHEST TUBE PLACEMENT NON-TUNNELLED LEFT   7/11/2020     IR CHEST TUBE REMOVAL NON TUNNELED LEFT  7/17/2020     IR CHEST TUBE REMOVAL TUNNELED LEFT  7/11/2020     LAPAROSCOPIC CHOLECYSTECTOMY N/A 11/22/2020    Procedure: CHOLECYSTECTOMY, LAPAROSCOPIC;  Surgeon: Annette Lambert MD;  Location:  OR     LAPAROSCOPIC HERNIORRHAPHY INGUINAL BILATERAL Bilateral 10/27/2020    Procedure: LAPAROSCOPIC BILATERAL INGUINAL HERNIA REPAIR WITH MESH;  Surgeon: Brian Garsia MD;  Location:  OR     Medications   Home Meds  Prior to Admission medications    Medication Sig Start Date End Date Taking? Authorizing Provider   apixaban ANTICOAGULANT (ELIQUIS) 5 MG tablet Take 1/2 tablet(2.5mg) by mouth twice daily 6/9/22  Yes Ame Mejía PA-C   carvedilol (COREG) 6.25 MG tablet Take 1 tablet (6.25 mg) by mouth 2 times daily (with meals)  Patient taking differently: Take 12.5 mg by mouth 2 times daily (with meals) 12/6/22  Yes Cony Julien DO   cyclobenzaprine (FLEXERIL) 10 MG tablet Take 10 mg by mouth nightly as needed for muscle spasms   Yes Unknown, Entered By History   furosemide (LASIX) 40 MG tablet Take 1 1/2 tablets(60mg) by mouth every day 8/4/22  Yes Cony Julien DO   HUMALOG 100 UNIT/ML injection Medtronic device with no CGM and site change every 3 days   Sensitivity factor (midnight to midnight): 55  Bolus (units:gram): 4131-6427 = 1:9, 4725-4422 = 1:7, 3657-3785 = 1:7, 6574-3993 = 1:9, 6368-3557 = 1:13  Basal (units/hour): 0304-7430 = 0.45, 3380-3033 = 0.5, 4455-1689 = 0.625, 6642-9325 = 0.6, 4518-9683 = 0.45 11/12/21  Yes Reported, Patient   irbesartan (AVAPRO) 150 MG tablet Take 1 tablet (150 mg) by mouth At Bedtime  Patient taking differently: Take 150 mg by mouth At Bedtime in addition to 300 mg tablet 7/22/22  Yes Cony Julien DO   irbesartan (AVAPRO) 300 MG tablet Take 1 tablet (300 mg) by mouth At Bedtime  Patient taking differently: Take 300 mg by mouth At Bedtime in addition to 150 mg tablet  22  Yes Cony Julien,    nystatin (MYCOSTATIN) 776735 UNIT/GM external cream Apply topically 2 times daily as needed    Yes Unknown, Entered By History   glucagon 1 MG kit 1 mg as needed for low blood sugar    Unknown, Entered By History       Scheduled Meds    insulin aspart  1-7 Units Subcutaneous TID AC     insulin aspart  1-5 Units Subcutaneous At Bedtime     insulin glargine  10 Units Subcutaneous At Bedtime       Infusion Meds    niCARdipine 2.5 mg/hr (23 0607)       PRN Meds  acetaminophen **OR** acetaminophen, glucose **OR** dextrose **OR** glucagon    Allergies   Allergies   Allergen Reactions     No Known Drug Allergies      Family History   Family History   Problem Relation Age of Onset     Family History Negative Mother          age 71     Family History Negative Father          age 70     Diabetes Brother         alive age 77     Diabetes Brother         alive age 76     Family History Negative Brother              Family History Negative Brother              Diabetes Sister         alive age76     Family History Negative Sister          age 86     Heart Disease Sister              Family History Negative Sister              Family History Negative Sister              Diabetes Sister      Diabetes Sister      Diabetes Brother      Diabetes Brother      Social History   Social History     Tobacco Use     Smoking status: Former     Packs/day: 0.20     Years: 40.00     Pack years: 8.00     Types: Cigarettes     Start date:      Quit date: 2008     Years since quitting: 15.0     Smokeless tobacco: Never   Substance Use Topics     Alcohol use: Not Currently     Alcohol/week: 35.0 standard drinks     Drug use: Not Currently       PHYSICAL EXAMINATION   Temp:  [98.5  F (36.9  C)-100  F (37.8  C)] 99.1  F (37.3  C)  Pulse:  [] 100  Resp:  [9-40] 21  BP: ()/() 140/97  MAP:  [67 mmHg-105 mmHg] 90  mmHg  Arterial Line BP: ()/(42-75) 142/68  SpO2:  [90 %-100 %] 95 %    Neurologic  Mental Status:  alert, oriented x 3, follows commands, nonspecific mild dysarthria  Cranial Nerves:  visual fields intact, PERRL, EOMI with normal smooth pursuit, facial sensation intact and symmetric, facial movements symmetric, palate elevation symmetric and uvula midline, no dysarthria, shoulder shrug strong bilaterally, tongue protrusion midline, hearing diminished  Motor:  normal muscle tone and bulk, no abnormal movements, able to move all limbs spontaneously  Reflexes:  deferred  Sensory:  light touch sensation intact and symmetric throughout upper and lower extremities  Coordination:  deferred  Station/Gait:  deferred    Dysphagia Screen  Per Nursing    Stroke Scales    ICH Score (at arrival)  Scoring Tool Score   Age ? 80 years 1 point Yes   GCS score  3-4   5-12   13-15   2 point  1 point  0 point GCS 13-15   Hematoma volume, cm 3 ? 30 1 point No   Intraventricular extension 1 point Yes   Infratentorial location 1 point No   Total 2         Imaging  I personally reviewed all imaging; relevant findings per HPI.    Labs Data   CBC  Recent Labs   Lab 01/16/23  0448 01/15/23  1321   WBC 15.7* 10.7   RBC 4.36* 5.17   HGB 12.1* 13.9   HCT 36.7* 44.7    275     Basic Metabolic Panel   Recent Labs   Lab 01/16/23  0448 01/15/23  2141 01/15/23  1321     --  138   POTASSIUM 4.1  --  3.8   CHLORIDE 103  --  102   CO2 22  --  29   BUN 26  --  14   CR 1.51*  --  1.48*   * 280* 244*   LLOYD 9.2  --  9.4     Liver Panel  Recent Labs   Lab 01/15/23  1321   PROTTOTAL 7.1   ALBUMIN 4.4   BILITOTAL 1.6*   ALKPHOS 76   AST 11   ALT 11     INR    Recent Labs   Lab Test 01/15/23  1321 11/21/20  1146 07/17/20  0950   INR 1.13 1.50* 1.18*      Lipid Profile    Recent Labs   Lab Test 05/13/20  0553   CHOL 116   HDL 60   LDL 43   TRIG 65     A1C    Recent Labs   Lab Test 11/20/20  2233 07/08/20  1223 04/25/20  1228   A1C 7.7*  7.4* 8.2*     Troponin    Recent Labs   Lab 01/15/23  1321   TROPONINIS 21          Stroke Consult Data Data   This was a non-emergent, non-telestroke consult.  I personally examined and evaluated the patient today. At the time of my evaluation and management the patient was in critical condition today due to ICH. Key decisions made today included aspire consideration. I spent a total of 45 minutes providing critical care services, evaluating the patient, directing care and reviewing laboratory values and radiologic reports.

## 2023-01-17 ENCOUNTER — APPOINTMENT (OUTPATIENT)
Dept: CT IMAGING | Facility: CLINIC | Age: 83
DRG: 064 | End: 2023-01-17
Payer: COMMERCIAL

## 2023-01-17 LAB
ANION GAP SERPL CALCULATED.3IONS-SCNC: 12 MMOL/L (ref 3–14)
BUN SERPL-MCNC: 36 MG/DL (ref 7–30)
CALCIUM SERPL-MCNC: 9.1 MG/DL (ref 8.5–10.1)
CHLORIDE BLD-SCNC: 105 MMOL/L (ref 94–109)
CHOLEST SERPL-MCNC: 112 MG/DL
CO2 SERPL-SCNC: 25 MMOL/L (ref 20–32)
CREAT SERPL-MCNC: 1.71 MG/DL (ref 0.66–1.25)
ERYTHROCYTE [DISTWIDTH] IN BLOOD BY AUTOMATED COUNT: 14.7 % (ref 10–15)
GFR SERPL CREATININE-BSD FRML MDRD: 39 ML/MIN/1.73M2
GLUCOSE BLD-MCNC: 220 MG/DL (ref 70–99)
GLUCOSE BLDC GLUCOMTR-MCNC: 102 MG/DL (ref 70–99)
GLUCOSE BLDC GLUCOMTR-MCNC: 102 MG/DL (ref 70–99)
GLUCOSE BLDC GLUCOMTR-MCNC: 109 MG/DL (ref 70–99)
GLUCOSE BLDC GLUCOMTR-MCNC: 109 MG/DL (ref 70–99)
GLUCOSE BLDC GLUCOMTR-MCNC: 129 MG/DL (ref 70–99)
GLUCOSE BLDC GLUCOMTR-MCNC: 132 MG/DL (ref 70–99)
GLUCOSE BLDC GLUCOMTR-MCNC: 132 MG/DL (ref 70–99)
GLUCOSE BLDC GLUCOMTR-MCNC: 133 MG/DL (ref 70–99)
GLUCOSE BLDC GLUCOMTR-MCNC: 134 MG/DL (ref 70–99)
GLUCOSE BLDC GLUCOMTR-MCNC: 144 MG/DL (ref 70–99)
GLUCOSE BLDC GLUCOMTR-MCNC: 150 MG/DL (ref 70–99)
GLUCOSE BLDC GLUCOMTR-MCNC: 155 MG/DL (ref 70–99)
GLUCOSE BLDC GLUCOMTR-MCNC: 164 MG/DL (ref 70–99)
GLUCOSE BLDC GLUCOMTR-MCNC: 201 MG/DL (ref 70–99)
GLUCOSE BLDC GLUCOMTR-MCNC: 206 MG/DL (ref 70–99)
GLUCOSE BLDC GLUCOMTR-MCNC: 232 MG/DL (ref 70–99)
GLUCOSE BLDC GLUCOMTR-MCNC: 95 MG/DL (ref 70–99)
HBA1C MFR BLD: 7.2 % (ref 0–5.6)
HCT VFR BLD AUTO: 37.1 % (ref 40–53)
HDLC SERPL-MCNC: 50 MG/DL
HGB BLD-MCNC: 11.8 G/DL (ref 13.3–17.7)
LDLC SERPL CALC-MCNC: 52 MG/DL
MCH RBC QN AUTO: 27.6 PG (ref 26.5–33)
MCHC RBC AUTO-ENTMCNC: 31.8 G/DL (ref 31.5–36.5)
MCV RBC AUTO: 87 FL (ref 78–100)
NONHDLC SERPL-MCNC: 62 MG/DL
PLATELET # BLD AUTO: 203 10E3/UL (ref 150–450)
POTASSIUM BLD-SCNC: 4.2 MMOL/L (ref 3.4–5.3)
RBC # BLD AUTO: 4.28 10E6/UL (ref 4.4–5.9)
SODIUM SERPL-SCNC: 142 MMOL/L (ref 133–144)
TRIGL SERPL-MCNC: 51 MG/DL
WBC # BLD AUTO: 16 10E3/UL (ref 4–11)

## 2023-01-17 PROCEDURE — 80061 LIPID PANEL: CPT | Performed by: PHYSICIAN ASSISTANT

## 2023-01-17 PROCEDURE — 250N000012 HC RX MED GY IP 250 OP 636 PS 637: Performed by: HOSPITALIST

## 2023-01-17 PROCEDURE — 99291 CRITICAL CARE FIRST HOUR: CPT | Mod: FS | Performed by: STUDENT IN AN ORGANIZED HEALTH CARE EDUCATION/TRAINING PROGRAM

## 2023-01-17 PROCEDURE — 70450 CT HEAD/BRAIN W/O DYE: CPT

## 2023-01-17 PROCEDURE — 250N000013 HC RX MED GY IP 250 OP 250 PS 637: Performed by: HOSPITALIST

## 2023-01-17 PROCEDURE — 85027 COMPLETE CBC AUTOMATED: CPT | Performed by: HOSPITALIST

## 2023-01-17 PROCEDURE — 258N000003 HC RX IP 258 OP 636: Performed by: HOSPITALIST

## 2023-01-17 PROCEDURE — 200N000001 HC R&B ICU

## 2023-01-17 PROCEDURE — 250N000011 HC RX IP 250 OP 636: Performed by: STUDENT IN AN ORGANIZED HEALTH CARE EDUCATION/TRAINING PROGRAM

## 2023-01-17 PROCEDURE — 250N000009 HC RX 250: Performed by: STUDENT IN AN ORGANIZED HEALTH CARE EDUCATION/TRAINING PROGRAM

## 2023-01-17 PROCEDURE — 83036 HEMOGLOBIN GLYCOSYLATED A1C: CPT | Performed by: PHYSICIAN ASSISTANT

## 2023-01-17 PROCEDURE — 99233 SBSQ HOSP IP/OBS HIGH 50: CPT | Performed by: HOSPITALIST

## 2023-01-17 PROCEDURE — 36415 COLL VENOUS BLD VENIPUNCTURE: CPT | Performed by: HOSPITALIST

## 2023-01-17 PROCEDURE — 80048 BASIC METABOLIC PNL TOTAL CA: CPT | Performed by: HOSPITALIST

## 2023-01-17 RX ORDER — NICOTINE POLACRILEX 4 MG
15-30 LOZENGE BUCCAL
Status: DISCONTINUED | OUTPATIENT
Start: 2023-01-17 | End: 2023-01-26

## 2023-01-17 RX ORDER — SODIUM CHLORIDE 9 MG/ML
INJECTION, SOLUTION INTRAVENOUS CONTINUOUS
Status: DISCONTINUED | OUTPATIENT
Start: 2023-01-17 | End: 2023-01-27 | Stop reason: HOSPADM

## 2023-01-17 RX ORDER — DEXTROSE MONOHYDRATE 25 G/50ML
25-50 INJECTION, SOLUTION INTRAVENOUS
Status: DISCONTINUED | OUTPATIENT
Start: 2023-01-17 | End: 2023-01-26

## 2023-01-17 RX ORDER — TAMSULOSIN HYDROCHLORIDE 0.4 MG/1
0.4 CAPSULE ORAL DAILY
Status: DISCONTINUED | OUTPATIENT
Start: 2023-01-17 | End: 2023-01-23

## 2023-01-17 RX ADMIN — DEXMEDETOMIDINE HYDROCHLORIDE 0.5 MCG/KG/HR: 400 INJECTION INTRAVENOUS at 08:15

## 2023-01-17 RX ADMIN — TAMSULOSIN HYDROCHLORIDE 0.4 MG: 0.4 CAPSULE ORAL at 12:25

## 2023-01-17 RX ADMIN — HALOPERIDOL LACTATE 5 MG: 5 INJECTION, SOLUTION INTRAMUSCULAR at 01:55

## 2023-01-17 RX ADMIN — HALOPERIDOL LACTATE 5 MG: 5 INJECTION, SOLUTION INTRAMUSCULAR at 02:11

## 2023-01-17 RX ADMIN — SODIUM CHLORIDE: 9 INJECTION, SOLUTION INTRAVENOUS at 07:35

## 2023-01-17 RX ADMIN — SODIUM CHLORIDE 2 UNITS/HR: 9 INJECTION, SOLUTION INTRAVENOUS at 05:10

## 2023-01-17 ASSESSMENT — ACTIVITIES OF DAILY LIVING (ADL)
ADLS_ACUITY_SCORE: 30
ADLS_ACUITY_SCORE: 26

## 2023-01-17 NOTE — PROGRESS NOTES
Brief Clinical Note:    Paged about patient being agitated, not following commands and unable to fully assess. No clear change in exam.     Plan:  -Would be ok with using Haldol for agitation- would avoid benzos if possible.   -precedex gtt  -Head CT stat     Rebecca Gonzales  Vascular Stroke Fellow    Stroke staff is Dr. King

## 2023-01-17 NOTE — PROGRESS NOTES
[facility]    Neurocritical Care Progress Note    Interval EventsHe was hypoglycemic last night (BG 38), insulin gtt turned off. This AM BG increased to 206. Now stable . Overnight he had worsening agitation/aggressive behavior and haldol was given along with started on precedex drip. CTH was stable with possible slight increase in size to SAH in L convexity. MRI pending for when patient able to tolerate.     HPI Summary  Tc Garcia is a 82 year old male with pertinent past medical history of Atrial fibrillation on apixaban, poorly controlled HTN (he reports home readings as high as 250/120), HLD, IDDMT2, CKD, on PTA not on PTA statin.    He presented to the [facility] ED 1/15/23 due to intermittent amnesia and encephalopathy for a few days. /120. Found to have a R lateral intraventricular hemorrhage with associated SAH. He was given Kcentra for anticoagulation reversal. Neurosurgery was consulted and no plans for surgical intervention. CTA head and neck unremarkable.     Evaluation Summarized    MRI/Head CT MRI: pending  CTH 1/17/23: stable small R periatrial parenchymal hemorrhage with intraventricular extension, Mild SAH possible slight increase in conspicuity of the left convexity compared to prior study  CTH 1/15/23: Large isolated R lateral IVH, Minimal interval increase in size of the occipital horn of the right lateral ventricle, moderate chronic SVID   Intracranial Vasculature CTA head/neck: negative for aneurysm or AVM, grossly similar IVH without evidence of active extravasation, focal moderate stenosis b/l P1     EKG/Telemetry Atrial fibrillation, incomplete RBBB, anteroseptal infarct age undetermined, ST/T wave abnormality, consider inferior ischemia   Other Testing Not Applicable     LDL  1/17/2023: 52 mg/dL   A1C  1/17/2023: 7.2 %   Troponin 1/15/2023: 21 ng/L       Impression   Primary spontaneous R IPH-lateral IVH with small associated SAH in setting of  "apixaban with poorly controlled HTN. ICH score: 2. Suspect pt has had ICH for days. S/p reversal of apixaban with Kcentra    Plan  -appreciate neurosurgery recommendations,no plan at this time for surgical intervention  -Neuro checks and vitals every 4 hours, okay from stroke perspective once weaned off drips to transfer to 7th neuro floor   -Hold all antiplatelets/anticoagulants/NSAIDs, and pharmacologic VTE prophylaxis  -Blood glucose monitoring, A1c 7.2% (recommend goal <7%), (BG was 206 early this AM following hypoglycemia BG 38 last night, now stable at 109), recommend maintain euglycemia <180 mg/dL, insulin gtt if needed  -MRI brain w/wo contrast when stable, if unremarkable may need to consider diagnostic cerebral angiogram  -PT/OT/SPT  -Elevate head of bed 30 degrees  -Systolic blood pressure goal <160, recommend starting oral hypertensives as needed to avoid nicardipine drip  -precedex drip, preference to PRN haldol over benzodiazepines, wean sedation as tolerated  -ECG/troponins x 3, telemetry   -Euthermia, eunatremia (Na today 142)  -Stroke Education  -neuro team planning to discuss ASPIRE (ASA v anticoagulation for patients with ICH while on AC) clinical trial with family tomorrow and once patient is more alert      Patient Follow-up    - final recommendation pending work-up    We will continue to follow.     Tosin Link PA-C  Vascular Neurology    To page me or covering stroke neurology team member, click here: AMCOM  Choose \"On Call\" tab at top, then select \"NEUROLOGY/ALL SITES\" from middle drop-down box, press Enter, then look for \"stroke\" or \"telestroke\" for your site.    ______________________________________________________    Clinically Significant Risk Factors                        # Overweight: Estimated body mass index is 29.62 kg/m  as calculated from the following:    Height as of this encounter: 1.524 m (5').    Weight as of this encounter: 68.8 kg (151 lb 10.8 oz)., PRESENT ON " ADMISSION           Medications   Scheduled Meds      Infusion Meds    dexmedetomidine 0.4 mcg/kg/hr (01/17/23 0816)     dextrose       insulin regular Stopped (01/17/23 0815)     niCARdipine Stopped (01/16/23 2115)     sodium chloride 10 mL/hr at 01/17/23 0735       PRN Meds  acetaminophen **OR** acetaminophen, dextrose, glucose **OR** dextrose **OR** glucagon       PHYSICAL EXAMINATION  Temp:  [97.5  F (36.4  C)-99.9  F (37.7  C)] 98.2  F (36.8  C)  Pulse:  [] 65  Resp:  [0-30] 21  BP: (111-134)/(59-87) 127/69  MAP:  [66 mmHg-98 mmHg] 98 mmHg  Arterial Line BP: ()/(44-92) 110/92  SpO2:  [90 %-100 %] 97 %      General Exam  General:  patient lying in bed without any acute distress  sedated  HEENT:  normocephalic/atraumatic  Pulmonary:  no respiratory distress maintaining saturations on room air    Neuro Exam  Mental Status: drowsy, Not alert, but arousable to minor verbal stimuli to wake up respond (on low dose precedex), oriented x 3, follows some limited commands, able to answer questions appropriately, no appreciated aphasia but speech slowed as he keeps falling asleep  Cranial Nerves: blinks to threat, PERRL, EOMI with normal smooth pursuit, facial movements grossly symmetric, hearing not formally tested but intact to conversation, moderate dysarthria (reportedly increased from yesterday in setting of sedation)  Motor: b/l UE with movement against gravity (appears grossly symmetric but does not follow commands to keep elevated), b/l LE with no effort against gravity, does not follow commands to elevate  Reflexes:  toes down-going  Sensory: grimaces and withdrawals to noxious all extremities  Coordination: not tested  Station/Gait:  deferred    Imaging  I personally reviewed all imaging; relevant findings per HPI.     Lab Results Data   CBC  Recent Labs   Lab 01/17/23  0436 01/16/23  0448 01/15/23  1321   WBC 16.0* 15.7* 10.7   RBC 4.28* 4.36* 5.17   HGB 11.8* 12.1* 13.9   HCT 37.1* 36.7* 44.7   PLT  203 215 275     Basic Metabolic Panel    Recent Labs   Lab 01/17/23  0813 01/17/23  0659 01/17/23  0615 01/17/23  0508 01/17/23  0436 01/16/23  0905 01/16/23  0448 01/15/23  2141 01/15/23  1321   NA  --   --   --   --  142  --  140  --  138   POTASSIUM  --   --   --   --  4.2  --  4.1  --  3.8   CHLORIDE  --   --   --   --  105  --  103  --  102   CO2  --   --   --   --  25  --  22  --  29   BUN  --   --   --   --  36*  --  26  --  14   CR  --   --   --   --  1.71*  --  1.51*  --  1.48*   GLC 95 132* 155*   < > 220*   < > 431*   < > 244*   LLOYD  --   --   --   --  9.1  --  9.2  --  9.4    < > = values in this interval not displayed.     Liver Panel  Recent Labs   Lab 01/15/23  1321   PROTTOTAL 7.1   ALBUMIN 4.4   BILITOTAL 1.6*   ALKPHOS 76   AST 11   ALT 11     INR    Recent Labs   Lab Test 01/15/23  1321 11/21/20  1146 07/17/20  0950   INR 1.13 1.50* 1.18*      Lipid Profile    Recent Labs   Lab Test 01/17/23  0436 05/13/20  0553   CHOL 112 116   HDL 50 60   LDL 52 43   TRIG 51 65     A1C    Recent Labs   Lab Test 01/17/23  0436 11/20/20  2233 07/08/20  1223   A1C 7.2* 7.7* 7.4*     Troponin    Recent Labs   Lab 01/15/23  1321   TROPONINIS 21          Data   I personally examined and evaluated the patient today. At the time of my evaluation and management the patient was in critical condition today due to R IVH. I personally managed review of chart, medical record, meds, imaging, history, exam, and discussion with attending regarding plan and documentation.  I spent a total of 45 minutes providing critical care services, evaluating the patient, directing care and reviewing laboratory values and radiologic reports.

## 2023-01-17 NOTE — PROGRESS NOTES
The patient is impulsive. He became more agitated and confused this evening after his chest CT. It took 3 staff members to keep him from crawling out of bed. Neuro-critical care and hospitalist were each pahged twice for the agitation. Haldol 2mg IV was ordered and given. Blood sugar was checked as a differential as patient has been on insulin drip (see MAR) and previous blood sugar was 99. Haldol dose was increased by hospitalist to 10mg. I was afraid to over-medicate the patient. I gave 5mg IV haldol. PAtient was bladder scanned for 300 ml. He attempted to pee and was unable. Straight cath was done. Patient has calmed down greatly, but is still impulsive/confused. Stat head CT was ordered as well as precedex drip. Night shift will get patient to the CT when he is calm enough to do so safely.   A-fib on monitor. Tachycardic with agitation, otherwise rate controlled. SBP goal<140systolic. Nicardipine drip infusing. See MAR.   Lung sounds clear on room air. O2 saturations >92%   Diet was advanced to moderate carbohydrate diet per Neuro-crit PA, as patient passed bedside swallow exam. He ate 50% of his dinner of pot roast, mashed potatoes, and green beans. No BM today.   Wilfrid Pedroza RN 7:40 PM 01/16/23

## 2023-01-17 NOTE — PROVIDER NOTIFICATION
Paged hospitalist regarding BG of 38, pt moving with stimulation but unresponsive otherwise. Gave 25mL of D50. With head bleed was hesitant to give full 50mL and concerned for aspiration with the oral gel.     Paged again 0030. Repeat BGs WDL. Sedation has been off. Pt still moving all extremities. Mumbles with vigorous stimulation.     Per discussion with hospitalist will keep insulin drip off and page if BG>150 to discuss next steps. Will get to CT as long as pt remains calm.

## 2023-01-17 NOTE — PROGRESS NOTES
Hospitalist Progress Note         Assessment & Plan   Tc Garcia, 82 year old male with PMH of stage III chronic kidney disease, anemia, paroxysmal atrial fibrillation on anticoagulation, pulmonary hypertension, essential hypertension, type 2 diabetes mellitus on insulin pump presents with increasing weakness, dizziness and lightheadedness with new intraventricular hemorrhage.  ICU admission.       Intraventricular hemorrhage, right lateral ventricle  Essential hypertension, uncontrolled on admission  History of atrial fibrillation, on apixaban anticoagulation prior to admission  New intraventricular hemorrhage, right-sided, potentially severe and life-threatening.  ICU admission.  Chronic anticoagulation prior to admission, apixaban for atrial fibrillation.  Patient reports history of multiple falls including prior to this admission, no known hit to head, patient states general imbalance with turning. Anticoagulation reversed in ER.  Neurosurgery and stroke neurology consulted.    ICU admission    Stroke Neurology; see note, nicardipine drip, repeat head CT, MRI.Stroke Neurology to review/manage; stable    Neurosurgery consult, see note, no immediate surgical intervention, monitor    IV nicardipine, titrate for BP control per Stroke Neuro recommendations with systolic blood pressure goal less than 140; no longer on nicardipine drip.  BP 120s.  Hold PTA antihypertensive    Hold preadmission carvedilol, irbesartan, and furosemide taken prior to admission, reassess daily;     Reversal of anticoagulation taken prior to admission (apixaban/Eliquis); KCentra given in ER, after acute symptoms resolved, needs decision regarding risk/benefits of ongoing AC for A. fib given history of multiple falls and presentation with IVH.    -; Tylenol as needed; avoid NSAID's due to bleeding; no pain issues.      Persistent restlessness, repeat head CT yesterday stable.  Did require two-point  soft wrist restraints, no longer using, sitter present.  Started on Precedex, currently titrated off.  No pain issues, UA negative, chest CT negative for pneumonia.  Nursing states bladder scan with urine retention, straight cath.  Protocol bladder scan ordered and if repeat need for straight cath insert Pruett catheter especially with rising CR and as urinary retention may be precipitating agitation.  Start Flomax.  BMP in AM.    PT/OT, ST when able.     Type 2 diabetes mellitus, insulin-dependent, insulin pump    Monitor blood sugars, continue PTA insulin pump, discontinue all other insulin Rx.      Patient eating with assistance, PTA patient on insulin pump, will hold off.  Transition of heparin drip, start low-dose Lantus and SSI, assess insulin requirements and adjust Lantus accordingly.  Pharm.D. to stop insulin drip once Lantus administered.     Abnormal admission chest x-ray, patchy opacity lung bases  Indeterminate chest x-ray on admission, small patchy opacity lung bases, artifact versus pneumonia versus mass.  Denies cough, sputum, dyspnea.  Denies aspiration.    Chest CT with nodules that will require outpatient follow-up per protocol, no acute finding suggestive of pneumonia.     Chronic kidney disease, stage III    Monitor serum Cr and urine output; admission Cr 1.48    CR serially increased today 1.71.  Bladder scan protocol, insert Pruett for possible obstructive component.  Avoid nephrotoxins.  Repeat BMP in AM.    Mild leukocytosis  WBC 16 on admission 10.7.  Afebrile.  No pyuria on UA.  Chest CT without acute findings of pneumonia.  Procalcitonin 0.20, hold off empiric antibiotics.  Monitor temp profile, repeat WBC in AM.    DVT Prophylaxis: Pneumatic Compression Devices  Code Status: No CPR- Do NOT Intubate    Expected discharge >2days pending clinical improvement, currently in ICU, Stroke Neurology plan established, safe disposition    Pranav Ambrocio MD  Text Page (7am - 6pm, M-F)    Total  time 50 minutes for today 1/17/2023 :  time consisted of the following, examination of patient, review of records including labs, imaging results, medications, interdisciplinary notes and completing documentation and orders.  Care Management included Coordination of Care time with Nursing and Sitter re: behaviors, feeding; discontinue precedex, insulin gtt transition to subcu insulin and Specialists, Stroke Neurology regarding IVH care plan, management and surveillance.     Interval History   Patient initially sedated on encounter due to receiving Precedex however did open eyes to voice and follow one-step.  Nursing notes history of agitation last evening, sitter present.  Required straight cath.  Reports no neurologic focality.     SH: No tobacco.  Lives with wife.    ROS: Complete ROS negative except as above.     -Data reviewed today: I reviewed all new labs and imaging results over the last 24 hours..    Physical Exam   Temp: 97.7  F (36.5  C) Temp src: Axillary BP: 121/74 Pulse: 68   Resp: 15 SpO2: 100 % O2 Device: Nasal cannula Oxygen Delivery: 2 LPM  Vitals:    01/15/23 1954 01/15/23 2121 01/17/23 0500   Weight: 71.7 kg (158 lb) 66.8 kg (147 lb 4.3 oz) 68.8 kg (151 lb 10.8 oz)     Vital Signs with Ranges  Temp:  [97.7  F (36.5  C)-99.9  F (37.7  C)] 97.7  F (36.5  C)  Pulse:  [] 68  Resp:  [11-29] 15  BP: (104-134)/(59-87) 121/74  MAP:  [67 mmHg-98 mmHg] 98 mmHg  Arterial Line BP: ()/(49-92) 110/92  SpO2:  [88 %-100 %] 100 %  I/O last 3 completed shifts:  In: 1110.74 [P.O.:760; I.V.:350.74]  Out: 700 [Urine:700]    General/Constitutional: NAD, initially somnolent, opens eyes to voice, follows one-step command.  HEENT/Head Exam:  atraumatic  Chest/Respiratory: Respirations nonlabored room air  Cardiovascular:  no murmur appreciated.  LE edema trace  Gastrointestinal/Abdomen:  soft, nontender  Neurologic:  gross CN and motor testing nonfocal.    Psychiatric:  Mental status: Orientation not tested.   Affect calm      Medications     dexmedetomidine Stopped (01/17/23 1200)     dextrose       insulin regular 0.5 Units/hr (01/17/23 1030)     niCARdipine Stopped (01/16/23 2115)     sodium chloride 10 mL/hr at 01/17/23 0735       insulin aspart  1-7 Units Subcutaneous TID AC     insulin aspart  1-5 Units Subcutaneous At Bedtime     insulin glargine  6 Units Subcutaneous At Bedtime     tamsulosin  0.4 mg Oral Daily       Data   Recent Labs   Lab 01/17/23  1650 01/17/23  1430 01/17/23  1219 01/17/23  0508 01/17/23  0436 01/16/23  0905 01/16/23  0448 01/15/23  2141 01/15/23  1321   WBC  --   --   --   --  16.0*  --  15.7*  --  10.7   HGB  --   --   --   --  11.8*  --  12.1*  --  13.9   MCV  --   --   --   --  87  --  84  --  87   PLT  --   --   --   --  203  --  215  --  275   INR  --   --   --   --   --   --   --   --  1.13   NA  --   --   --   --  142  --  140  --  138   POTASSIUM  --   --   --   --  4.2  --  4.1  --  3.8   CHLORIDE  --   --   --   --  105  --  103  --  102   CO2  --   --   --   --  25  --  22  --  29   BUN  --   --   --   --  36*  --  26  --  14   CR  --   --   --   --  1.71*  --  1.51*  --  1.48*   ANIONGAP  --   --   --   --  12  --  15*  --  7   LLOYD  --   --   --   --  9.1  --  9.2  --  9.4   * 129* 144*   < > 220*   < > 431*   < > 244*   ALBUMIN  --   --   --   --   --   --   --   --  4.4   PROTTOTAL  --   --   --   --   --   --   --   --  7.1   BILITOTAL  --   --   --   --   --   --   --   --  1.6*   ALKPHOS  --   --   --   --   --   --   --   --  76   ALT  --   --   --   --   --   --   --   --  11   AST  --   --   --   --   --   --   --   --  11    < > = values in this interval not displayed.       Recent Results (from the past 24 hour(s))   CT Chest w/o Contrast    Narrative    EXAM: CT CHEST W/O CONTRAST  LOCATION: [facility]  DATE/TIME: 1/16/2023 6:05 PM    INDICATION: Follow-up chest x-ray, bilateral base opacities.  COMPARISON: CT of the chest  08/05/2020  TECHNIQUE: CT chest without IV contrast. Multiplanar reformats were obtained. Dose reduction techniques were used.  CONTRAST: None.    FINDINGS:   LUNGS AND PLEURA: Since 08/05/2020, the prior seen left hydropneumothorax has resolved, otherwise I do not appreciate any other significant changes. There are 2 stable adjacent areas of groundglass infiltrate seen with the largest measuring approximately   11.4 x 9.8 mm. These are seen in the upper aspect of the right upper lobe laterally. There is some persistent left pleural thickening with some associated calcifications and adjacent atelectasis. Sequelae of prior granulomatous infection.    MEDIASTINUM/AXILLAE: Sequelae of prior granulomatous infection.    CORONARY ARTERY CALCIFICATION: Marked trivessel.    UPPER ABDOMEN: Scattered benign calcified splenic and hepatic granulomas.    MUSCULOSKELETAL: Numerous old rib fractures. Old appearing mild compression fractures at the T2 and L1 levels.      Impression    IMPRESSION:   1.  Since 08/05/2020, the prior seen left hydropneumothorax has resolved. Otherwise the chest is stable.    2.  Chronic left pleural thickening with some associated calcification and adjacent atelectasis.    3.  Old compression fractures and bilateral rib fractures.    4.  Stable groundglass infiltrates in the right upper lobe with the largest measuring approximately 11.4 x 9.8 mm. Please refer to below report for possible follow-up.    REFERENCE:  Guidelines for Management of Incidental Pulmonary Nodules Detected on CT Images: From the Fleischner Society 2017.   Guidelines apply to incidental nodules in patients who are 35 years or older.  Guidelines do not apply to lung cancer screening, patients with immunosuppression, or patients with known primary cancer.    SUBSOLID NODULES  Ground glass  Nodule size <6 mm  No routine follow-up. If suspicious, consider follow-up at 2 and 4 years.    Nodule size 6 mm or greater  CT at 6-12 months to  confirm persistence, then CT every 2 years until 5 years.    Part solid  Nodule size <6 mm  No routine follow-up.    Nodule size 6 mm or greater  CT at 3-6 months to confirm persistence. If unchanged and solid component remains <6 mm, annual CT for 5 years.    Multiple  CT at 3-6 months. If stable, consider CT at 2 and 4 years. Management based on the most suspicious nodule.    Consider referral to lung nodule clinic.       CT Head w/o Contrast    Narrative    EXAM: CT HEAD W/O CONTRAST  LOCATION: [facility]  DATE/TIME: 1/17/2023 2:02 AM    INDICATION: Hemorrhage.  COMPARISON: Multiple prior studies, the most recent which is from 1/16/2023.  TECHNIQUE: Routine CT Head without IV contrast. Multiplanar reformats. Dose reduction techniques were used.    FINDINGS:  INTRACRANIAL CONTENTS: Small ovoid parenchymal hemorrhage in the right periatrial white matter again noted. Interventricular extension into the right occipital horn again noted which is grossly stable. Small amount of interventricular hemorrhage in the   left occipital horn. Minimal subarachnoid hemorrhage over the convexities is stable apart from perhaps slight increase in conspicuity of a left parieto-occipital focus. Volume loss and presumed chronic small vessel ischemia are stable. Ventricular size   is unchanged.    VISUALIZED ORBITS/SINUSES/MASTOIDS: No intraorbital abnormality. No paranasal sinus mucosal disease. No middle ear or mastoid effusion.    BONES/SOFT TISSUES: No acute abnormality.      Impression    IMPRESSION:  1.  Stable small right periatrial parenchymal hemorrhage with interventricular extension. Stable ventricular size.    2.  Mild subarachnoid hemorrhage over the convexities is again noted with perhaps slight increase in conspicuity of the left convexity compared to the prior study.    }

## 2023-01-17 NOTE — PLAN OF CARE
Goal Outcome Evaluation:    Neuro: Alert to self and intermittently to place and time. Had two bouts of extreme agitation, sitting up in bed, kicking legs, swinging arms, trying to bite and verbally abusive. 10 of haldol given once and was able to manage with dex the second time.   CV: Afib controlled. Nicardipine off since 2115. BP stable. Afebrile.  Resp: Tachypneic after bouts of agitation otherwise WDL. Lung sounds clear.   GI: No BM. Passing gas. Treated for hypoglycemia with 25mL of D50 with resolution. Pt currently on Alg 1, 2 units.   : Low uop. Last scanned at 0400 for 260mL. MD aware.   Pain: Intermittently complained of neck pain but resolved with repositioning.   Skin: Scattered bruising. L skin tear developed with last bout of agitation.

## 2023-01-17 NOTE — PROVIDER NOTIFICATION
Notified hospitalist of bout of extreme agitation. Pt sitting up, screaming and verbally abusive, kicking and swinging arms. Dex needed to be titrated faster than order set allowed for pt and staff safety. Pt calmed down on 0.8 of dex.

## 2023-01-17 NOTE — PROVIDER NOTIFICATION
Notified hospitalist of low uop. 243 last bladder scan. Also discussed A line no longer working or drawing back. Per MD, no new orders at this time.

## 2023-01-18 ENCOUNTER — APPOINTMENT (OUTPATIENT)
Dept: MRI IMAGING | Facility: CLINIC | Age: 83
DRG: 064 | End: 2023-01-18
Attending: PHYSICIAN ASSISTANT
Payer: COMMERCIAL

## 2023-01-18 LAB
ANION GAP SERPL CALCULATED.3IONS-SCNC: 14 MMOL/L (ref 7–15)
BUN SERPL-MCNC: 31.9 MG/DL (ref 8–23)
CALCIUM SERPL-MCNC: 9.1 MG/DL (ref 8.8–10.2)
CHLORIDE SERPL-SCNC: 99 MMOL/L (ref 98–107)
CREAT SERPL-MCNC: 1.51 MG/DL (ref 0.67–1.17)
DEPRECATED HCO3 PLAS-SCNC: 26 MMOL/L (ref 22–29)
ERYTHROCYTE [DISTWIDTH] IN BLOOD BY AUTOMATED COUNT: 14.7 % (ref 10–15)
GFR SERPL CREATININE-BSD FRML MDRD: 46 ML/MIN/1.73M2
GLUCOSE BLDC GLUCOMTR-MCNC: 203 MG/DL (ref 70–99)
GLUCOSE BLDC GLUCOMTR-MCNC: 285 MG/DL (ref 70–99)
GLUCOSE BLDC GLUCOMTR-MCNC: 307 MG/DL (ref 70–99)
GLUCOSE BLDC GLUCOMTR-MCNC: 344 MG/DL (ref 70–99)
GLUCOSE BLDC GLUCOMTR-MCNC: 385 MG/DL (ref 70–99)
GLUCOSE SERPL-MCNC: 265 MG/DL (ref 70–99)
HCT VFR BLD AUTO: 39.2 % (ref 40–53)
HGB BLD-MCNC: 12.6 G/DL (ref 13.3–17.7)
MCH RBC QN AUTO: 27.4 PG (ref 26.5–33)
MCHC RBC AUTO-ENTMCNC: 32.1 G/DL (ref 31.5–36.5)
MCV RBC AUTO: 85 FL (ref 78–100)
PLATELET # BLD AUTO: 206 10E3/UL (ref 150–450)
POTASSIUM SERPL-SCNC: 4.3 MMOL/L (ref 3.4–5.3)
RBC # BLD AUTO: 4.6 10E6/UL (ref 4.4–5.9)
SODIUM SERPL-SCNC: 139 MMOL/L (ref 136–145)
WBC # BLD AUTO: 12.4 10E3/UL (ref 4–11)

## 2023-01-18 PROCEDURE — 85027 COMPLETE CBC AUTOMATED: CPT | Performed by: HOSPITALIST

## 2023-01-18 PROCEDURE — 36415 COLL VENOUS BLD VENIPUNCTURE: CPT | Performed by: HOSPITALIST

## 2023-01-18 PROCEDURE — 99233 SBSQ HOSP IP/OBS HIGH 50: CPT | Mod: FS | Performed by: PHYSICIAN ASSISTANT

## 2023-01-18 PROCEDURE — 70553 MRI BRAIN STEM W/O & W/DYE: CPT

## 2023-01-18 PROCEDURE — A9585 GADOBUTROL INJECTION: HCPCS | Performed by: HOSPITALIST

## 2023-01-18 PROCEDURE — 80048 BASIC METABOLIC PNL TOTAL CA: CPT | Performed by: HOSPITALIST

## 2023-01-18 PROCEDURE — 250N000013 HC RX MED GY IP 250 OP 250 PS 637: Performed by: HOSPITALIST

## 2023-01-18 PROCEDURE — 250N000013 HC RX MED GY IP 250 OP 250 PS 637: Performed by: INTERNAL MEDICINE

## 2023-01-18 PROCEDURE — 99232 SBSQ HOSP IP/OBS MODERATE 35: CPT | Mod: FS | Performed by: STUDENT IN AN ORGANIZED HEALTH CARE EDUCATION/TRAINING PROGRAM

## 2023-01-18 PROCEDURE — 120N000001 HC R&B MED SURG/OB

## 2023-01-18 PROCEDURE — 255N000002 HC RX 255 OP 636: Performed by: HOSPITALIST

## 2023-01-18 PROCEDURE — 99233 SBSQ HOSP IP/OBS HIGH 50: CPT | Performed by: HOSPITALIST

## 2023-01-18 PROCEDURE — 250N000011 HC RX IP 250 OP 636: Performed by: INTERNAL MEDICINE

## 2023-01-18 RX ORDER — CARVEDILOL 12.5 MG/1
12.5 TABLET ORAL 2 TIMES DAILY WITH MEALS
Status: DISCONTINUED | OUTPATIENT
Start: 2023-01-18 | End: 2023-01-20

## 2023-01-18 RX ORDER — HYDRALAZINE HYDROCHLORIDE 20 MG/ML
10 INJECTION INTRAMUSCULAR; INTRAVENOUS EVERY 4 HOURS PRN
Status: DISCONTINUED | OUTPATIENT
Start: 2023-01-18 | End: 2023-01-27 | Stop reason: HOSPADM

## 2023-01-18 RX ORDER — CARVEDILOL 6.25 MG/1
6.25 TABLET ORAL 2 TIMES DAILY WITH MEALS
Status: DISCONTINUED | OUTPATIENT
Start: 2023-01-18 | End: 2023-01-18

## 2023-01-18 RX ORDER — CARVEDILOL 6.25 MG/1
6.25 TABLET ORAL ONCE
Status: COMPLETED | OUTPATIENT
Start: 2023-01-18 | End: 2023-01-18

## 2023-01-18 RX ORDER — GADOBUTROL 604.72 MG/ML
7 INJECTION INTRAVENOUS ONCE
Status: COMPLETED | OUTPATIENT
Start: 2023-01-18 | End: 2023-01-18

## 2023-01-18 RX ORDER — AMLODIPINE BESYLATE 5 MG/1
5 TABLET ORAL DAILY
Status: DISCONTINUED | OUTPATIENT
Start: 2023-01-18 | End: 2023-01-19

## 2023-01-18 RX ADMIN — AMLODIPINE BESYLATE 5 MG: 5 TABLET ORAL at 15:47

## 2023-01-18 RX ADMIN — CARVEDILOL 6.25 MG: 6.25 TABLET, FILM COATED ORAL at 10:08

## 2023-01-18 RX ADMIN — CARVEDILOL 6.25 MG: 6.25 TABLET, FILM COATED ORAL at 07:34

## 2023-01-18 RX ADMIN — HYDRALAZINE HYDROCHLORIDE 10 MG: 20 INJECTION INTRAMUSCULAR; INTRAVENOUS at 09:11

## 2023-01-18 RX ADMIN — HYDRALAZINE HYDROCHLORIDE 10 MG: 20 INJECTION INTRAMUSCULAR; INTRAVENOUS at 05:36

## 2023-01-18 RX ADMIN — CARVEDILOL 12.5 MG: 12.5 TABLET, FILM COATED ORAL at 21:23

## 2023-01-18 RX ADMIN — TAMSULOSIN HYDROCHLORIDE 0.4 MG: 0.4 CAPSULE ORAL at 07:34

## 2023-01-18 RX ADMIN — GADOBUTROL 7 ML: 604.72 INJECTION INTRAVENOUS at 09:45

## 2023-01-18 ASSESSMENT — ACTIVITIES OF DAILY LIVING (ADL)
ADLS_ACUITY_SCORE: 29
ADLS_ACUITY_SCORE: 26
ADLS_ACUITY_SCORE: 29

## 2023-01-18 NOTE — PROGRESS NOTES
[facility]    Neurocritical Care Progress Note    Interval EventsMRI completed today and transferred out of ICU. Inquired with patient if he had any missed doses of his Eliquis given there are embolic ischemic infarcts present on MRI that are acute/subacute. He doesn't recall how recently but states that he occasionally forgets one day but never misses 2 days. We discussed importance of taking the anticoagulation and possibly can use an alarm to remember to take the medications as they will not provide adequate coverage. He agreed. He will monitor his BP closely as stressed the importance of tight BP control while on anticoagulation with history of recent bleed.     We also discussed ASPIRE clinical trial. He requested that stroke team reach out to his PCP and cardiologist to ensure they wouldn't have any concerns with the trial. PCP is Dr. Jessika Cordoba. Cardiologist is Dr. Cony Julien and KAMILLA Mejía.    HPI Summary  Tc Garcia is a 82 year old male with pertinent past medical history of Atrial fibrillation on apixaban, poorly controlled HTN (he reports home readings as high as 250/120), HLD, IDDMT2, CKD, on PTA not on PTA statin.    He presented to the [facility] ED 1/15/23 due to intermittent amnesia and encephalopathy for a few days. /120. Found to have a R lateral intraventricular hemorrhage with associated SAH. He was given Kcentra for anticoagulation reversal. Neurosurgery was consulted and no plans for surgical intervention. CTA head and neck unremarkable.     He had agitation/aggressive behavior and required haldol and precedex drip 1/17/23 then weaned off. Repeat CTH was stable with possible slight increase in size to SAH in L convexity. He had some issues with BP and BG control requiring insulin and nicardipine drips, working on weaning off and maintaining goals prior to transferring out of ICU.     Evaluation Summarized    MRI/Head CT MRI:  stable R periatrial white matter IPH with R lateral IVH and small L lateral IVH, moderate edema, scattered acute/subacute b/l cerebral hemisphere and R basal ganglia ischemic infarcts without evidence of hemorrhagic transformation, no midline shift, moderate chronic SVID, several probable chronic cerebellar microhemorrhages, likely R cerebral convexity meningioma  CTH 1/17/23: stable small R periatrial parenchymal hemorrhage with intraventricular extension, Mild SAH possible slight increase in conspicuity of the left convexity compared to prior study  CTH 1/15/23: Large isolated R lateral IVH, Minimal interval increase in size of the occipital horn of the right lateral ventricle, moderate chronic SVID   Intracranial Vasculature CTA head/neck: negative for aneurysm or AVM, grossly similar IVH without evidence of active extravasation, focal moderate stenosis b/l P1     EKG/Telemetry Atrial fibrillation, incomplete RBBB, anteroseptal infarct age undetermined, ST/T wave abnormality, consider inferior ischemia   Other Testing Not Applicable     LDL  1/17/2023: 52 mg/dL   A1C  1/17/2023: 7.2 %   Troponin No lab value available in past 48 hrs       Impression   Primary spontaneous R IPH-R lateral IVH small L lateral IVH with small associated SAH, moderate edema in setting of apixaban with poorly controlled HTN. ICH score: 2. Suspect pt has had ICH for days. 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    Plan  -appreciate neurosurgery recommendations,no plan at this time for surgical intervention  -Neuro checks and vitals every 4 hours, transferred out of ICU today  -okay to start  "chemical VTE ppx today, but continue to hold all other antiplatelets/anticoagulants/NSAIDs  -Blood glucose monitoring, A1c 7.2% (recommend goal <7%), (BG was high 265 this AM), recommend maintain euglycemia <180 mg/dL, insulin gtt if needed to minimize brain edema  -PT/OT/SPT  -Elevate head of bed 30 degrees  -Systolic blood pressure goal <160, recommend adding oral hypertensives as needed to maintain BP goal  -precedex drip was stopped 1/17/23, preference to PRN haldol over benzodiazepines if needed for patient/staff safety  -telemetry   -Euthermia, eunatremia (Na today 139)  -Stroke Education  -possible ASPIRE trial candidate (ASA v AC for Afib patients with ICH), discussed and stroke team will reach out to his PCP/cardiology teams per patient request to ensure no concerns from their perspectives with possible enrollment in this trial       Patient Follow-up    -F/u with PCP in 1-2 weeks  -F/u with neurology team in 4-8 weeks (ordered)  -F/u with neurosurgery team for meningioma (ordered)    We will follow for stability and ongoing discussion of potential clinical trial.    Tosin Link PA-C  Vascular Neurology    To page me or covering stroke neurology team member, click here: [facility]COM  Choose \"On Call\" tab at top, then select \"NEUROLOGY/ALL SITES\" from middle drop-down box, press Enter, then look for \"stroke\" or \"telestroke\" for your site.    ______________________________________________________    Clinically Significant Risk Factors                        # Overweight: Estimated body mass index is 29.62 kg/m  as calculated from the following:    Height as of this encounter: 1.524 m (5').    Weight as of this encounter: 68.8 kg (151 lb 10.8 oz)., PRESENT ON ADMISSION           Medications   Scheduled Meds    carvedilol  12.5 mg Oral BID w/meals     insulin aspart  1-7 Units Subcutaneous TID AC     insulin aspart  1-5 Units Subcutaneous At Bedtime     insulin glargine  6 Units Subcutaneous At Bedtime     " tamsulosin  0.4 mg Oral Daily       Infusion Meds    dexmedetomidine Stopped (01/17/23 1200)     dextrose       niCARdipine Stopped (01/16/23 2115)     sodium chloride Stopped (01/17/23 1545)       PRN Meds  acetaminophen **OR** acetaminophen, dextrose, glucose **OR** dextrose **OR** glucagon, hydrALAZINE       PHYSICAL EXAMINATION  Temp:  [97.7  F (36.5  C)-98.6  F (37  C)] 98  F (36.7  C)  Pulse:  [] 93  Resp:  [12-25] 18  BP: (104-184)/(60-97) 171/88  SpO2:  [88 %-100 %] 95 %      General Exam  General:  patient lying in bed without any acute distress  HEENT:  normocephalic/atraumatic  Pulmonary:  no respiratory distress     Neuro Exam  Mental Status: alert, oriented x3, follows all commands, able to answer questions appropriately, no aphasia   Cranial Nerves: no visual field deficit, PERRL, EOMI with normal smooth pursuit, slight flattening of L nasolabial fold, hearing not formally tested but intact to conversation, moderate dysarthria   Motor: no drift to any extremities but decreased L  strength 4/5, strength 5/5 to other extremities  Reflexes:  toes down-going  Sensory: light touch sensation intact and symmetric  Coordination: finger to nose and heel to shin intact and symmetric  Station/Gait:  deferred    National [employer] Stroke Scale  Exam Interval: inpt eval   Score    Level of consciousness: (0)   Alert, keenly responsive    LOC questions: (0)   Answers both questions correctly    LOC commands: (0)   Performs both tasks correctly    Best gaze: (0)   Normal    Visual: (0)   No visual loss    Facial palsy: (1)   Minor paralysis (flat nasolabial fold, smile asymmetry)    Motor arm (left): (0)   No drift- decreased L  strength    Motor arm (right): (0)   No drift    Motor leg (left): (0)   No drift    Motor leg (right): (0)   No drift    Limb ataxia: (0)   Absent    Sensory: (0)   Normal- no sensory loss    Best language: (0)   Normal- no aphasia    Dysarthria: (1)   Mild to  moderate dysarthria    Extinction and inattention: (0)   No abnormality        Total Score:  2     Imaging  I personally reviewed all imaging; relevant findings per HPI.     Lab Results Data   CBC  Recent Labs   Lab 01/18/23  0504 01/17/23  0436 01/16/23 0448   WBC 12.4* 16.0* 15.7*   RBC 4.60 4.28* 4.36*   HGB 12.6* 11.8* 12.1*   HCT 39.2* 37.1* 36.7*    203 215     Basic Metabolic Panel    Recent Labs   Lab 01/18/23  0738 01/18/23  0504 01/18/23  0153 01/17/23  0508 01/17/23  0436 01/16/23  0905 01/16/23 0448   NA  --  139  --   --  142  --  140   POTASSIUM  --  4.3  --   --  4.2  --  4.1   CHLORIDE  --  99  --   --  105  --  103   CO2  --  26  --   --  25  --  22   BUN  --  31.9*  --   --  36*  --  26   CR  --  1.51*  --   --  1.71*  --  1.51*   * 265* 285*   < > 220*   < > 431*   LLOYD  --  9.1  --   --  9.1  --  9.2    < > = values in this interval not displayed.     Liver Panel  Recent Labs   Lab 01/15/23  1321   PROTTOTAL 7.1   ALBUMIN 4.4   BILITOTAL 1.6*   ALKPHOS 76   AST 11   ALT 11     INR    Recent Labs   Lab Test 01/15/23  1321 11/21/20  1146 07/17/20  0950   INR 1.13 1.50* 1.18*      Lipid Profile    Recent Labs   Lab Test 01/17/23  0436 05/13/20  0553   CHOL 112 116   HDL 50 60   LDL 52 43   TRIG 51 65     A1C    Recent Labs   Lab Test 01/17/23  0436 11/20/20  2233 07/08/20  1223   A1C 7.2* 7.7* 7.4*     Troponin    Recent Labs   Lab 01/15/23  1321   TROPONINIS 21          Data   Billing: I have personally spent a total of 35 minutes providing care today, time spent in reviewing medical records and reviewing tests, examining the patient and obtaining history, coordination of care, and discussion with the patient and/or family regarding diagnostic results, prognosis, symptom management, risks and benefits of management options, and development of plan of care. Greater than 50% was spent in counseling and coordination of care.

## 2023-01-18 NOTE — PROGRESS NOTES
Notified provider about indwelling cho catheter discussed removal or continued need.    Did provider choose to remove indwelling cho catheter? No    Provider's cho indication for keeping indwelling cho catheter: Retention.    Is there an order for indwelling cho catheter? Yes    *If there is a plan to keep cho catheter in place at discharge daily notification with provider is not necessary.

## 2023-01-18 NOTE — PLAN OF CARE
Neuro: Alert, oriented to self, place, and time. Confused to situation. Very forgetful. Overall very calm and redirectable this shift.,   CV: Afib CVR. Blood pressures elevated this morning above goal of 160 systolic. Hospitalist paged and 10mg prn hydralazine ordered and administered. Unfortunately, this was not effective at lowering patient's blood pressures.   Respiratory: Stable, on 2L overnight.   GI/: Up to commode three times during the night but only passed large amounts of gas. Pruett catheter was placed due to urinary retention and needed straight catheterization for over 24 hours.  Skin: Scattered bruising and skin tears.   Activity: Bedrest  Diet: Mod carb, needs assistance with feeding.   Drips: None  Other: Sitter discontinued due to patient being calm and cooperative.   Plan: Transfer out of ICU pending bed availability.

## 2023-01-18 NOTE — PROGRESS NOTES
Xcoverage: paged by RN re -170's; SBP goal <160; Hydralazine iv prn ordered; Neuro recommends to start oral antihypertensive meds to avoid Nicardipine drip; resume PTA Coreg 6.25 mg po BID; monitor BP, adjust meds as needed.    Zehra Durant, Hospitalist

## 2023-01-18 NOTE — PLAN OF CARE
Pt remains confused, however pleasantly confused throughout the day, we weaned off the precidex gtt per MAR, transitioned IV insulin to subcutaneous insulin sliding scale and lantus, pt up in chair x 2 today, pt's daughter Camille updated in person at bedside.

## 2023-01-18 NOTE — PROGRESS NOTES
[facility]    Hospitalist Progress Note         Assessment & Plan   Tc Garcia, 82 year old male with PMH of stage III chronic kidney disease, anemia, paroxysmal atrial fibrillation on anticoagulation, pulmonary hypertension, essential hypertension, type 2 diabetes mellitus on insulin pump presents with increasing weakness, dizziness and lightheadedness with new intraventricular hemorrhage.  ICU admission.       Intraventricular hemorrhage, right lateral ventricle  Essential hypertension, uncontrolled on admission  History of atrial fibrillation, on apixaban anticoagulation prior to admission, rate controlled  New intraventricular hemorrhage, right-sided, potentially severe and life-threatening.  ICU admission.  Chronic anticoagulation prior to admission, apixaban for atrial fibrillation.  Patient reports history of multiple falls including prior to this admission, no known hit to head, patient states general imbalance with turning. Anticoagulation reversed in ER.  Neurosurgery and stroke neurology consulted.    ICU admission    Stroke Neurology; see note, nicardipine drip, repeat head CT, MRI.Stroke Neurology to review/manage; stable    Neurosurgery consult, see note, no immediate surgical intervention, monitor    IV nicardipine, titrate for BP control per Stroke Neuro recommendations with systolic blood pressure goal less than 140; no longer on nicardipine drip.  BP 120s.  Hold PTA antihypertensive    Hold preadmission carvedilol, irbesartan, and furosemide taken prior to admission,     -1/18/2023 off PTA antihypertensive, BP climbing, serial restart and increase carvedilol to 12.5 mg twice daily, still BP above goal, start amlodipine, holding avapro due to ABBIE.  Monitor.    Reversal of anticoagulation taken prior to admission (apixaban/Eliquis); KCentra given in ER, after acute symptoms resolved, needs decision regarding risk/benefits of ongoing AC for A. fib given history of multiple  falls and presentation with IVH.  1/18/2023 as mental status has significantly improved, initiate OT/PT to assess mobility status/fall risk that we will contribute to decision regarding  AC.    -; Tylenol as needed; avoid NSAID's due to bleeding; no pain issues.      Persistent restlessness, repeat head CT stable.  Did require two-point soft wrist restraints, no longer using, sitter present.  Started on Precedex, currently titrated off.  No pain issues, UA negative, chest CT negative for pneumonia.  Bladder scans with persistent urinary retention, Pruett placed, retention may be contributory to patient's agitation start Flomax.  With Pruett in place no further agitation, no need for sitter, mental status significantly improved.    PT/OT as above.    MRI brain, ICH noted, notation of scattered acute-subacute appearing infarcts involving both hemisphere, citation several probable chronic microhemorrhage in the cerebellum.  MRI findings of cerebellum consistent with patient's multiple falls and sense of imbalance.  PT/OT assessment above.  With fall risk does not appear to be a candidate for ongoing AC for his atrial fibrillation.       Type 2 diabetes mellitus, insulin-dependent, insulin pump    Monitor blood sugars, continue PTA insulin pump, discontinue all other insulin Rx.      Patient eating with assistance, PTA patient on insulin pump, will hold off.  Transition off heparin drip, start low-dose Lantus and SSI, assess insulin requirements and adjust Lantus accordingly.  Pharm.D. to stop insulin drip once Lantus administered.    -Review of records indicates glucoses 203-307, serial increase Lantus to 10 units, continue SSI.     Abnormal admission chest x-ray, patchy opacity lung bases  Indeterminate chest x-ray on admission, small patchy opacity lung bases, artifact versus pneumonia versus mass.  Denies cough, sputum, dyspnea.  Denies aspiration.    Chest CT with nodules that will require outpatient follow-up per  protocol, no acute finding suggestive of pneumonia.     Chronic kidney disease, stage III    Monitor serum Cr and urine output; admission Cr 1.48    CR serially increased 1.71/max.  CR improved 1.51 after Pruett placed, likely component of urinary retention..  Avoid nephrotoxins.  Repeat BMP in AM.    Mild leukocytosis  WBC 16 on admission 10.7.  Afebrile.  No pyuria on UA.  Chest CT without acute findings of pneumonia.  Procalcitonin 0.20, hold off empiric antibiotics.  Monitor temp profile, afebrile, WBC serially decreasing today 12.4.  Recheck in AM.    DVT Prophylaxis: Pneumatic Compression Devices  Code Status: No CPR- Do NOT Intubate    Expected discharge >2days pending clinical improvement, transition out of ICU to Neurology stroke Neurology plan established, Therapy assessment regarding safe disposition      Pranav Ambrocio MD  Text Page (7am - 6pm, M-F)    Total time 50 minutes for today 1/18/2023 :  time consisted of the following, examination of patient, review of records including labs, imaging results, medications, interdisciplinary notes and completing documentation and orders.  Care Management included counseling/discussion with Patient regarding current condition including ICH; start Therapies, hx of falls and Coordination of Care time with Nursing re: BP and Specialists, Stroke neurology regarding ICH care plan, management and surveillance.      Interval History   Patient markably more alert, oriented, conversive, appropriate.  Off Precedex, off sitter, off nicardipine.  However blood pressure is climbing off nicardipine and of PTA antihypertensive, Avapro held due to ABBIE.  Serial increase carvedilol, start amlodipine, has as needed IV hydralazine for BP above goal.  Again patient reports history of multiple falls, 3 related to imbalance, see above.  Question ongoing AC for A. fib.  Start PT/OT.     SH: No tobacco.  Lives with wife.    ROS: Complete ROS negative except as above.     -Data  reviewed today: I reviewed all new labs and imaging results over the last 24 hours..    Physical Exam   Temp: 97.9  F (36.6  C) Temp src: Oral BP: (!) 143/101 Pulse: 76   Resp: 13 SpO2: 97 % O2 Device: None (Room air) Oxygen Delivery: 2 LPM  Vitals:    01/15/23 1954 01/15/23 2121 01/17/23 0500   Weight: 71.7 kg (158 lb) 66.8 kg (147 lb 4.3 oz) 68.8 kg (151 lb 10.8 oz)     Vital Signs with Ranges  Temp:  [97.7  F (36.5  C)-98.6  F (37  C)] 97.9  F (36.6  C)  Pulse:  [] 76  Resp:  [12-25] 13  BP: (109-184)/() 143/101  SpO2:  [91 %-100 %] 97 %  I/O last 3 completed shifts:  In: 398.38 [P.O.:340; I.V.:58.38]  Out: 1175 [Urine:1175]    General/Constitutional:  NAD, more alert, calm, cooperative    HEENT/Head Exam:  atraumatic  Chest/Respiratory: Respirations nonlabored room air  Cardiovascular:  no murmur appreciated.  LE edema trace  Gastrointestinal/Abdomen:  soft, nontender  Neurologic:  gross CN and motor testing nonfocal.    Psychiatric:  Mental status: Orientation x 1; affect alert and calm       Medications     dexmedetomidine Stopped (01/17/23 1200)     dextrose       niCARdipine Stopped (01/16/23 2115)     sodium chloride Stopped (01/17/23 1545)       amLODIPine  5 mg Oral Daily     carvedilol  12.5 mg Oral BID w/meals     insulin aspart  1-7 Units Subcutaneous TID AC     insulin aspart  1-5 Units Subcutaneous At Bedtime     insulin glargine  10 Units Subcutaneous At Bedtime     tamsulosin  0.4 mg Oral Daily       Data   Recent Labs   Lab 01/18/23  1134 01/18/23  0738 01/18/23  0504 01/17/23  0508 01/17/23  0436 01/16/23  0905 01/16/23  0448 01/15/23  2141 01/15/23  1321   WBC  --   --  12.4*  --  16.0*  --  15.7*  --  10.7   HGB  --   --  12.6*  --  11.8*  --  12.1*  --  13.9   MCV  --   --  85  --  87  --  84  --  87   PLT  --   --  206  --  203  --  215  --  275   INR  --   --   --   --   --   --   --   --  1.13   NA  --   --  139  --  142  --  140  --  138   POTASSIUM  --   --  4.3  --  4.2  --   4.1  --  3.8   CHLORIDE  --   --  99  --  105  --  103  --  102   CO2  --   --  26  --  25  --  22  --  29   BUN  --   --  31.9*  --  36*  --  26  --  14   CR  --   --  1.51*  --  1.71*  --  1.51*  --  1.48*   ANIONGAP  --   --  14  --  12  --  15*  --  7   LLOYD  --   --  9.1  --  9.1  --  9.2  --  9.4   * 203* 265*   < > 220*   < > 431*   < > 244*   ALBUMIN  --   --   --   --   --   --   --   --  4.4   PROTTOTAL  --   --   --   --   --   --   --   --  7.1   BILITOTAL  --   --   --   --   --   --   --   --  1.6*   ALKPHOS  --   --   --   --   --   --   --   --  76   ALT  --   --   --   --   --   --   --   --  11   AST  --   --   --   --   --   --   --   --  11    < > = values in this interval not displayed.       Recent Results (from the past 24 hour(s))   MR Brain w/o & w Contrast    Narrative    MRI BRAIN WITHOUT AND WITH CONTRAST  1/18/2023 10:05 AM     HISTORY: ICH     TECHNIQUE: Multiplanar, multisequence MRI of the brain without and  with 7 mL Gadavist.     COMPARISON: Several prior comparisons, most recent head CT 1/17/2023.     FINDINGS: Ovoid parenchymal hemorrhage in the right periatrial white  matter with intraventricular extension into the right lateral  ventricle, this hemorrhage is heterogeneous on T1 with hyper and  hypointense components and is hypointense on T2. Small amount of  hemorrhage also in the occipital horn of the left lateral ventricle.  Moderate T2 hyperintense edema surrounding the parenchymal hemorrhage.  No significant midline shift or herniation. Areas of restricted  diffusion in the right parietal lobe and left parietal/occipital  region. Smaller areas of cortical restricted diffusion in the medial  occipital lobes bilaterally. Small area of restricted diffusion in the  right basal ganglia. T2 hyperintensity is present in the areas of  restricted diffusion. Moderate diffuse parenchymal volume loss. Patchy  periventricular white matter T2 hyperintensities are nonspecific,  but  most likely related to chronic microvascular ischemic disease.  Ventricles are prominent in size but not significantly enlarged out of  proportion to the cerebral sulci. Several foci of susceptibility  hypointensity in the cerebellum probably due to chronic  microhemorrhages.    Thin dural based enhancement over the right cerebral convexity  measuring 1.7 x 1.9 x 0.5 cm (series 12 image 17) which most likely  represents a meningioma.    No suspicious enhancement in the brain parenchyma.     The facial structures appear normal. The major arterial T2 flow voids  at the base of the brain appear patent.       Impression    IMPRESSION:    1. Again seen is the parenchymal hemorrhage in the right periatrial  white matter with intraventricular extension into the right lateral  ventricle with small amount of hemorrhage in the left lateral  ventricle. Moderate T2 hyperintense edema around the parenchymal  hemorrhage. No definite underlying enhancing mass although a mass  could be obscured by the hemorrhage. No evidence of hydrocephalus or  ventricular entrapment.  2. Scattered areas of acute/subacute appearing infarcts within both  cerebral hemispheres and the right basal ganglia as detailed above. No  definite hemorrhagic transformation of infarct. No midline shift or  herniation.  3. Moderate diffuse parenchymal volume loss and white matter changes  most likely due to chronic microvascular ischemic disease.  4. Several probable chronic microhemorrhages in the cerebellum.  5. Thin dural based enhancing lesion over the right cerebral convexity  most likely representing a meningioma.      CLINT CAAL MD         SYSTEM ID:  [id number]    }

## 2023-01-19 ENCOUNTER — APPOINTMENT (OUTPATIENT)
Dept: OCCUPATIONAL THERAPY | Facility: CLINIC | Age: 83
DRG: 064 | End: 2023-01-19
Attending: HOSPITALIST
Payer: COMMERCIAL

## 2023-01-19 ENCOUNTER — TELEPHONE (OUTPATIENT)
Dept: NEUROSURGERY | Facility: CLINIC | Age: 83
End: 2023-01-19
Payer: COMMERCIAL

## 2023-01-19 LAB
ANION GAP SERPL CALCULATED.3IONS-SCNC: 10 MMOL/L (ref 7–15)
BUN SERPL-MCNC: 20.4 MG/DL (ref 8–23)
CALCIUM SERPL-MCNC: 9.8 MG/DL (ref 8.8–10.2)
CHLORIDE SERPL-SCNC: 103 MMOL/L (ref 98–107)
CREAT SERPL-MCNC: 1.21 MG/DL (ref 0.67–1.17)
DEPRECATED HCO3 PLAS-SCNC: 29 MMOL/L (ref 22–29)
GFR SERPL CREATININE-BSD FRML MDRD: 59 ML/MIN/1.73M2
GLUCOSE BLDC GLUCOMTR-MCNC: 164 MG/DL (ref 70–99)
GLUCOSE BLDC GLUCOMTR-MCNC: 172 MG/DL (ref 70–99)
GLUCOSE BLDC GLUCOMTR-MCNC: 222 MG/DL (ref 70–99)
GLUCOSE BLDC GLUCOMTR-MCNC: 304 MG/DL (ref 70–99)
GLUCOSE BLDC GLUCOMTR-MCNC: 304 MG/DL (ref 70–99)
GLUCOSE SERPL-MCNC: 159 MG/DL (ref 70–99)
POTASSIUM SERPL-SCNC: 3.9 MMOL/L (ref 3.4–5.3)
SODIUM SERPL-SCNC: 142 MMOL/L (ref 136–145)

## 2023-01-19 PROCEDURE — 97165 OT EVAL LOW COMPLEX 30 MIN: CPT | Mod: GO

## 2023-01-19 PROCEDURE — 250N000013 HC RX MED GY IP 250 OP 250 PS 637: Performed by: HOSPITALIST

## 2023-01-19 PROCEDURE — 250N000011 HC RX IP 250 OP 636: Performed by: INTERNAL MEDICINE

## 2023-01-19 PROCEDURE — 120N000001 HC R&B MED SURG/OB

## 2023-01-19 PROCEDURE — 97535 SELF CARE MNGMENT TRAINING: CPT | Mod: GO

## 2023-01-19 PROCEDURE — 80048 BASIC METABOLIC PNL TOTAL CA: CPT | Performed by: HOSPITALIST

## 2023-01-19 PROCEDURE — 97530 THERAPEUTIC ACTIVITIES: CPT | Mod: GO

## 2023-01-19 PROCEDURE — 36415 COLL VENOUS BLD VENIPUNCTURE: CPT | Performed by: HOSPITALIST

## 2023-01-19 PROCEDURE — 99233 SBSQ HOSP IP/OBS HIGH 50: CPT | Performed by: HOSPITALIST

## 2023-01-19 PROCEDURE — 999N000127 HC STATISTIC PERIPHERAL IV START W US GUIDANCE

## 2023-01-19 RX ORDER — AMLODIPINE BESYLATE 10 MG/1
10 TABLET ORAL DAILY
Status: DISCONTINUED | OUTPATIENT
Start: 2023-01-20 | End: 2023-01-27 | Stop reason: HOSPADM

## 2023-01-19 RX ORDER — AMLODIPINE BESYLATE 5 MG/1
5 TABLET ORAL ONCE
Status: COMPLETED | OUTPATIENT
Start: 2023-01-19 | End: 2023-01-19

## 2023-01-19 RX ADMIN — CARVEDILOL 12.5 MG: 12.5 TABLET, FILM COATED ORAL at 08:52

## 2023-01-19 RX ADMIN — HYDRALAZINE HYDROCHLORIDE 10 MG: 20 INJECTION INTRAMUSCULAR; INTRAVENOUS at 21:49

## 2023-01-19 RX ADMIN — HYDRALAZINE HYDROCHLORIDE 10 MG: 20 INJECTION INTRAMUSCULAR; INTRAVENOUS at 18:33

## 2023-01-19 RX ADMIN — HYDRALAZINE HYDROCHLORIDE 10 MG: 20 INJECTION INTRAMUSCULAR; INTRAVENOUS at 10:09

## 2023-01-19 RX ADMIN — AMLODIPINE BESYLATE 5 MG: 5 TABLET ORAL at 08:52

## 2023-01-19 RX ADMIN — ACETAMINOPHEN 650 MG: 325 TABLET, FILM COATED ORAL at 21:49

## 2023-01-19 RX ADMIN — CARVEDILOL 12.5 MG: 12.5 TABLET, FILM COATED ORAL at 17:35

## 2023-01-19 RX ADMIN — AMLODIPINE BESYLATE 5 MG: 5 TABLET ORAL at 15:04

## 2023-01-19 RX ADMIN — TAMSULOSIN HYDROCHLORIDE 0.4 MG: 0.4 CAPSULE ORAL at 08:52

## 2023-01-19 ASSESSMENT — ACTIVITIES OF DAILY LIVING (ADL)
ADLS_ACUITY_SCORE: 28
ADLS_ACUITY_SCORE: 27
ADLS_ACUITY_SCORE: 28
ADLS_ACUITY_SCORE: 27
ADLS_ACUITY_SCORE: 28
ADLS_ACUITY_SCORE: 28
ADLS_ACUITY_SCORE: 27
ADLS_ACUITY_SCORE: 28
ADLS_ACUITY_SCORE: 27
ADLS_ACUITY_SCORE: 28
ADLS_ACUITY_SCORE: 28
ADLS_ACUITY_SCORE: 27

## 2023-01-19 NOTE — PROGRESS NOTES
[facility]    Hospitalist Progress Note         Assessment & Plan   Tc Garcia, 82 year old male with PMH of stage III chronic kidney disease, anemia, paroxysmal atrial fibrillation on anticoagulation, pulmonary hypertension, essential hypertension, type 2 diabetes mellitus on insulin pump presents with increasing weakness, dizziness and lightheadedness with new intraventricular hemorrhage.  ICU admission.       Intraventricular hemorrhage, right lateral ventricle  Essential hypertension, uncontrolled on admission  History of atrial fibrillation, on apixaban anticoagulation prior to admission, rate controlled  New intraventricular hemorrhage, right-sided, potentially severe and life-threatening.  ICU admission.  Chronic anticoagulation prior to admission, apixaban for atrial fibrillation.  Patient reports history of multiple falls including prior to this admission, no known hit to head, patient states general imbalance with turning. Anticoagulation reversed in ER.  Neurosurgery and stroke neurology consulted.    ICU admission    Stroke Neurology; see note, nicardipine drip, repeat head CT, MRI.Stroke Neurology to review/manage; stable    Neurosurgery consult, see note, no immediate surgical intervention, monitor    IV nicardipine, titrate for BP control per Stroke Neuro recommendations with systolic blood pressure goal less than 140; no longer on nicardipine drip.  BP 120s.  Hold PTA antihypertensive    Hold preadmission carvedilol, irbesartan, and furosemide taken prior to admission,     -1/18/2023 off PTA antihypertensive, BP climbing, serial restart and increase carvedilol to 12.5 mg twice daily, still BP above goal, start amlodipine, holding avapro due to ABBIE.  Monitor.    Reversal of anticoagulation taken prior to admission (apixaban/Eliquis); KCentra given in ER, after acute symptoms resolved, needs decision regarding risk/benefits of ongoing AC for A. fib given history of multiple  "falls and presentation with IV.  1/18/2023 as mental status has significantly improved, initiate OT/PT to assess mobility status/fall risk that we will contribute to decision regarding  AC.    -; Tylenol as needed; avoid NSAID's due to bleeding; no pain issues.      Persistent restlessness, repeat head CT stable.  Did require two-point soft wrist restraints, no longer using, sitter present.  Started on Precedex, currently titrated off.  No pain issues, UA negative, chest CT negative for pneumonia.  Bladder scans with persistent urinary retention, Pruett placed, retention may be contributory to patient's agitation start Flomax.  With Pruett in place no further agitation, no need for sitter, mental status significantly improved.    PT/OT as above.    MRI brain, ICH noted, notation of scattered acute-subacute appearing infarcts involving both hemisphere, citation several probable chronic microhemorrhage in the cerebellum.  MRI findings of cerebellum consistent with patient's multiple falls and sense of imbalance.  PT/OT assessment above.  With fall risk does not appear to be a candidate for ongoing AC for his atrial fibrillation.    Continued increase mentation, alert on encounter, oriented, self eating breakfast.  PT assessment pending.  Patient reports 3 recent falls, one in shower and 2 attributed to\" imbalance.  It is noted that patient has microhemorrhage in cerebellum that likely may be related to imbalance/ataxia.  Daughter Brittani called by telephone and discussed current conditions, improved, followed by Stroke Neurology and with histories of falls concern regarding ongoing AC and his living situation.  Pending PT assessment likely candidate for TCU.  She describes mother also with cognitive decline with both living in a two-story home, concern regarding safety/fall risk.  Although Stroke neurology recommended patient can proceed with DVT PXP given significant ICH, history of falls, patient is ambulatory with PCD " at this time we will hold off anticoagulation.    -BP remains elevated, further increase amlodipine dose to 10 mg, restarted on PTA carvedilol, Avapro held due to ABBIE.  Patient has as needed hydralazine.       Type 2 diabetes mellitus, insulin-dependent, insulin pump    Monitor blood sugars, continue PTA insulin pump, discontinue all other insulin Rx.      Patient eating with assistance, PTA patient on insulin pump, will hold off.  Transition off heparin drip, start low-dose Lantus and SSI, assess insulin requirements and adjust Lantus accordingly.  Pharm.D. to stop insulin drip once Lantus administered.    -Review of records indicates glucoses up to 222 serial increase Lantus by 2 units to 12 units, continue SSI.     Abnormal admission chest x-ray, patchy opacity lung bases  Indeterminate chest x-ray on admission, small patchy opacity lung bases, artifact versus pneumonia versus mass.  Denies cough, sputum, dyspnea.  Denies aspiration.    Chest CT with nodules that will require outpatient follow-up per protocol, no acute finding suggestive of pneumonia.     Chronic kidney disease, stage III    Monitor serum Cr and urine output; admission Cr 1.48    CR serially increased 1.71/max.  CR improved 1.51 after Cho placed, likely component of urinary retention..  Avoid nephrotoxins.  Creatinine continues to descend however still elevated 1.21, given history of urine retention maintain Cho, consider voiding trial tomorrow.  Review of records 7 months test 1 year ago elevated CR 1.6-1.7.    Mild leukocytosis  WBC 16 on admission 10.7.  Afebrile.  No pyuria on UA.  Chest CT without acute findings of pneumonia.  Procalcitonin 0.20, hold off empiric antibiotics.  Monitor temp profile, afebrile, WBC serially decreasing today 12.4.  Recheck in AM.    DVT Prophylaxis: Pneumatic Compression Devices  Code Status: No CPR- Do NOT Intubate    Expected discharge >2days pending clinical improvement, cho out; Stroke Neurology plan  established, Therapy assessment regarding safe disposition      Pranav Ambrocio MD  Text Page (7am - 6pm, M-F)    Total time 50 minutes for today 1/19/2023 :  time consisted of the following, examination of patient, review of records including labs, imaging results, medications, interdisciplinary notes and completing documentation and orders.  Care Management included counseling/discussion with Patient and daughter Brittani by phone regarding current condition including ICH, history of falls, ABBIE, urinary retention and Coordination of Care time with Nursing regarding ICH, hypertension and Specialists, Stroke Neurology regarding ICH care plan, management and surveillance.      Interval History   Continues to increase alertness, orientation, conversive, appropriate.  Now out of ICU.  No new neurologic focality.  Nurses state patient is ambulatory with direct assistance.  BP remains hypertensive, Avapro held due to ABBIE, further increase amlodipine, has as needed hydralazine.     SH: No tobacco.  Lives with wife.  See states wife with increasing age-related cognitive impairment.  Lives in 2-3 story home, children including 1 daughter Brittani in [place], Camille in [facility], son in [facility] [place] [facility] considering facility such as [place].    ROS: Complete ROS negative except as above.     -Data reviewed today: I reviewed all new labs and imaging results over the last 24 hours..    Physical Exam   Temp: 98.4  F (36.9  C) Temp src: Oral BP: (!) 159/91 Pulse: 85   Resp: 16 SpO2: 100 % O2 Device: None (Room air)    Vitals:    01/15/23 1954 01/15/23 2121 01/17/23 0500   Weight: 71.7 kg (158 lb) 66.8 kg (147 lb 4.3 oz) 68.8 kg (151 lb 10.8 oz)     Vital Signs with Ranges  Temp:  [97.6  F (36.4  C)-98.4  F (36.9  C)] 98.4  F (36.9  C)  Pulse:  [76-88] 85  Resp:  [13-16] 16  BP: (129-187)/() 159/91  SpO2:  [93 %-100 %] 100 %  I/O last 3 completed shifts:  In: 120 [P.O.:120]  Out: 1550 [Urine:1550]    General/Constitutional:    NAD, more alert,  calm, cooperative    HEENT/Head Exam:  atraumatic  Chest/Respiratory: Respirations nonlabored room air  Cardiovascular:  no murmur appreciated.  LE edema trace  Gastrointestinal/Abdomen:  soft, nontender  Neurologic:  gross CN and motor testing nonfocal.  Ambulation not tested.  Psychiatric:  Mental status: Orientation x 3 ; affect alert and calm       Medications     dextrose       sodium chloride Stopped (01/17/23 1547)       [START ON 1/20/2023] amLODIPine  10 mg Oral Daily     amLODIPine  5 mg Oral Once     carvedilol  12.5 mg Oral BID w/meals     insulin aspart  1-7 Units Subcutaneous TID AC     insulin aspart  1-5 Units Subcutaneous At Bedtime     insulin glargine  12 Units Subcutaneous At Bedtime     tamsulosin  0.4 mg Oral Daily       Data   Recent Labs   Lab 01/19/23  1305 01/19/23  1139 01/19/23  0851 01/18/23  0738 01/18/23  0504 01/17/23  0508 01/17/23  0436 01/16/23  0905 01/16/23  0448 01/15/23  2141 01/15/23  1321   WBC  --   --   --   --  12.4*  --  16.0*  --  15.7*  --  10.7   HGB  --   --   --   --  12.6*  --  11.8*  --  12.1*  --  13.9   MCV  --   --   --   --  85  --  87  --  84  --  87   PLT  --   --   --   --  206  --  203  --  215  --  275   INR  --   --   --   --   --   --   --   --   --   --  1.13   NA  --   --  142  --  139  --  142  --  140  --  138   POTASSIUM  --   --  3.9  --  4.3  --  4.2  --  4.1  --  3.8   CHLORIDE  --   --  103  --  99  --  105  --  103  --  102   CO2  --   --  29  --  26  --  25  --  22  --  29   BUN  --   --  20.4  --  31.9*  --  36*  --  26  --  14   CR  --   --  1.21*  --  1.51*  --  1.71*  --  1.51*  --  1.48*   ANIONGAP  --   --  10  --  14  --  12  --  15*  --  7   LLOYD  --   --  9.8  --  9.1  --  9.1  --  9.2  --  9.4   * 172* 159*   < > 265*   < > 220*   < > 431*   < > 244*   ALBUMIN  --   --   --   --   --   --   --   --   --   --  4.4   PROTTOTAL  --   --   --   --   --   --   --   --   --   --  7.1   BILITOTAL  --   --   --   --   --   --   --   --    --   --  1.6*   ALKPHOS  --   --   --   --   --   --   --   --   --   --  76   ALT  --   --   --   --   --   --   --   --   --   --  11   AST  --   --   --   --   --   --   --   --   --   --  11    < > = values in this interval not displayed.

## 2023-01-19 NOTE — PROGRESS NOTES
Stroke team following peripherally for stability and ongoing management of BP and BG, (discussed with hospitalist). Attending, Dr. King did reach out to patient's cardiologist to discuss Aspire trial and ensure no contraindications from their perspective. Waiting to hear back.

## 2023-01-19 NOTE — PROVIDER NOTIFICATION
MD Notification    Notified Person: MD    Notified Person Name:  Hoangkamaladiana    Notification Date/Time: 01/19/23 1180    Notification Interaction: Kenan     Purpose of Notification: PATRICIA, Physical therapy was in to see patient and noted his BP dropped significantly when going from sit to stand. Was 170/101 with sitting and 129/89 once standing a couple min later.     Orders Received:    Comments:

## 2023-01-19 NOTE — CARE PLAN
Pt arrived from ICu partly alert and oriented but does have times of confusion. Up with lift. Pruett in place with good output. Neuros intact except general weakness. Tele A fib CVR. Blood glucose 344. Amlodipine started this shift. Plan to continue to monitor tonight.

## 2023-01-19 NOTE — PROGRESS NOTES
"   01/19/23 1400   Appointment Info   Signing Clinician's Name / Credentials (OT) Arlene Estrada, OTR/L   Living Environment   People in Home spouse   Current Living Arrangements house   Home Accessibility stairs to enter home   Number of Stairs, Main Entrance 5   Stair Railings, Main Entrance railing on right side (ascending)   Number of Stairs, Within Home, Primary other (see comments)  (Pt has chair lifts going up and down.)   Stair Railings, Within Home, Primary other (see comments)  (Pt has chair lifts going up and down.)   Transportation Anticipated public transportation  (Pt not sure)   Living Environment Comments Pt resides with spouse, unsure of ability to provide physical assist. BR SET UP: tub/shower combo (pt reported he fell using this 2 weeks ago), grab bars, shower chair access if needed; standard toilet. Pt does not use AD baseline, has access to two walkers (unable to specify kind), but does not use.   Self-Care   Usual Activity Tolerance moderate   Regular Exercise Yes   Activity/Exercise Type other (see comments)  (OP PT once per week)   Fall history within last six months yes   Number of times patient has fallen within last six months 3   Activity/Exercise/Self-Care Comment Ind all ADLs baseline   Instrumental Activities of Daily Living (IADL)   Previous Responsibilities meal prep;housekeeping;shopping;driving;medication management;finances   IADL Comments Spouse does laundry   General Information   Onset of Illness/Injury or Date of Surgery 01/15/23   Referring Physician Pranav Ambrocio MD   Patient/Family Therapy Goal Statement (OT) Not stated   Additional Occupational Profile Info/Pertinent History of Current Problem Per MD Note: \"Tc Garcia, 82 year old male with PMH of stage III chronic kidney disease, anemia, paroxysmal atrial fibrillation on anticoagulation, pulmonary hypertension, essential hypertension, type 2 diabetes mellitus on insulin pump presents with increasing weakness, " "dizziness and lightheadedness with new intraventricular hemorrhage.  ICU admission.\"   Existing Precautions/Restrictions fall   General Observations and Info Positive for Orthostatic BP   Cognitive Status Examination   Orientation Status person;place  (Not time)   Cognitive Screens/Assessments   Cognitive Assessments Completed 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 Pt demonstrates deficits in the areas of Attention/Immediate Recall/Orientation (Questions 1 through 3); Delayed Recall with Interference (Questions 4 and 7); Numeric Calculation and Registration (Question 5); Immediate Recall with Interference/Time Constraint (Question 6); Executive Function plus Extrapolation (Question 11). Overall results may impact safety within completion of I/ADLs independently.   Visual Perception   Visual Impairment/Limitations WFL;corrective lenses for reading   Visual Acuity Intact near and far   Impact of Vision Impairment on Function (Vision) Vision intact saccades, tracking, fields, convergence.   Sensory   Sensory Quick Adds sensation intact   Sensory Comments BUEs   Range of Motion Comprehensive   Comment, General Range of Motion BUEs WFL   Strength Comprehensive (MMT)   Comment, General Manual Muscle Testing (MMT) Assessment BUEs WFL evidenced by ability to push self up into standing from chair   Coordination   Gross Motor Coordination Slight undershooting with finger-nose test   Bed Mobility   Comment (Bed Mobility) Not tested   Transfers   Transfers sit-stand transfer   Sit-Stand Transfer   Sit/Stand Transfer Comments SBA   Balance   Balance Comments Static standing, " pt steady with no AD   Activities of Daily Living   BADL Assessment/Intervention lower body dressing;toileting   Lower Body Dressing Assessment/Training   Comment, (Lower Body Dressing) Not tested d/t positive for ortho BP   Toileting   Comment, (Toileting) Catheter   Clinical Impression   Criteria for Skilled Therapeutic Interventions Met (OT) Yes, treatment indicated   OT Diagnosis Decreased ind for I/ADLs   OT Problem List-Impairments impacting ADL problems related to;activity tolerance impaired;cognition;strength   Assessment of Occupational Performance 1-3 Performance Deficits   Identified Performance Deficits Toileting, LB dressing   Planned Therapy Interventions (OT) ADL retraining;IADL retraining;transfer training   Clinical Decision Making Complexity (OT) low complexity   Risk & Benefits of therapy have been explained patient   OT Total Evaluation Time   OT Eval, Low Complexity Minutes (18254) 14   OT Goals   Therapy Frequency (OT) Daily   OT Predicted Duration/Target Date for Goal Attainment 02/01/23   OT Goals Hygiene/Grooming;Lower Body Dressing;Toilet Transfer/Toileting;Cognition   OT: Hygiene/Grooming modified independent;while standing  (AD as needed)   OT: Lower Body Dressing Modified independent;including set-up/clothing retrieval  (AD as needed)   OT: Toilet Transfer/Toileting Modified independent;toilet transfer;cleaning and garment management  (AD as needed)   OT: Cognitive Patient/caregiver will verbalize understanding of cognitive assessment results/recommendations as needed for safe discharge planning   Self-Care/Home Management   Self-Care/Home Mgmt/ADL, Compensatory, Meal Prep Minutes (96722) 8   Symptoms Noted During/After Treatment (Meal Preparation/Planning Training) none   Treatment Detail/Skilled Intervention Pt participated in SLUMS cog screen. Pt educated on memory strategies to implement in the home for increased ind with self cares. Pt educated on fall risk redeuction strategies to  implement in the home.   Therapeutic Activities   Therapeutic Activity Minutes (94394) 8   Symptoms noted during/after treatment fatigue   Treatment Detail/Skilled Intervention Pt up in chair, agreeable to OT. Pt demo STS to/from chair with SBA, No AD tolerating approx. 3 minutes of standing - pt positive for Ortho BPs, pt encouraged to return to sitting.   OT Discharge Planning   OT Plan med management; Montior BP with mobility; Education on tub-shower bench   OT Discharge Recommendation (DC Rec) Acute Rehab Center-Motivated patient will benefit from intensive, interdisciplinary therapy.  Anticipate will be able to tolerate 3 hours of therapy per day;Transitional Care Facility   OT Rationale for DC Rec Pt currently limited in functional mobility d/t Ortho BP, pt demo deficits in cognition, strength and activity tolerance all of which impact ind in ADL completion. Pending progression with functional mobility, pt may toerate 3 hours of therapy, has supportive spouse vs TCU. Pt resides at home with spouse who may not be able to provide physical assist and pt presents as high fall risk with hx of multiple falls at home.   OT Brief overview of current status SBA STS -positive for Ortho BP; SLUMS 23/30   Total Session Time   Timed Code Treatment Minutes 16   Total Session Time (sum of timed and untimed services) 30

## 2023-01-19 NOTE — PLAN OF CARE
Pt here with ICH, possible infarct. A&Ox3, disoriented to time. Neuros generalization, slow to respond, BUE tremors, BUE 4/5, BLE 3/5. VSS. Tele Afib. MOD CHO diet, THIN liquids. Takes pills whole. Up with A1/GB, Pruett in for retention and patent. Denies pain. Pt scoring green on the Aggression Stop Light Tool. Discharge PENDING.

## 2023-01-19 NOTE — TELEPHONE ENCOUNTER
1st attempt at workque- LVM    Referred by Tosin Link PA-C  Meningioma (H) [D32.9]  f/u for R cerebral convexity meningioma  MRI Brain 1/18/23 - [facility]  CT Head 1/15/23, 1/16/23, 1/17/23 - [facility]  PT -  Injections -   Surgeries -

## 2023-01-20 ENCOUNTER — APPOINTMENT (OUTPATIENT)
Dept: PHYSICAL THERAPY | Facility: CLINIC | Age: 83
DRG: 064 | End: 2023-01-20
Attending: HOSPITALIST
Payer: COMMERCIAL

## 2023-01-20 ENCOUNTER — APPOINTMENT (OUTPATIENT)
Dept: OCCUPATIONAL THERAPY | Facility: CLINIC | Age: 83
DRG: 064 | End: 2023-01-20
Payer: COMMERCIAL

## 2023-01-20 LAB
ANION GAP SERPL CALCULATED.3IONS-SCNC: 11 MMOL/L (ref 7–15)
BUN SERPL-MCNC: 21.9 MG/DL (ref 8–23)
CALCIUM SERPL-MCNC: 10 MG/DL (ref 8.8–10.2)
CHLORIDE SERPL-SCNC: 98 MMOL/L (ref 98–107)
CREAT SERPL-MCNC: 1.24 MG/DL (ref 0.67–1.17)
DEPRECATED HCO3 PLAS-SCNC: 29 MMOL/L (ref 22–29)
GFR SERPL CREATININE-BSD FRML MDRD: 58 ML/MIN/1.73M2
GLUCOSE BLDC GLUCOMTR-MCNC: 137 MG/DL (ref 70–99)
GLUCOSE BLDC GLUCOMTR-MCNC: 199 MG/DL (ref 70–99)
GLUCOSE BLDC GLUCOMTR-MCNC: 248 MG/DL (ref 70–99)
GLUCOSE BLDC GLUCOMTR-MCNC: 254 MG/DL (ref 70–99)
GLUCOSE BLDC GLUCOMTR-MCNC: 335 MG/DL (ref 70–99)
GLUCOSE SERPL-MCNC: 214 MG/DL (ref 70–99)
POTASSIUM SERPL-SCNC: 4 MMOL/L (ref 3.4–5.3)
SODIUM SERPL-SCNC: 138 MMOL/L (ref 136–145)

## 2023-01-20 PROCEDURE — 99233 SBSQ HOSP IP/OBS HIGH 50: CPT | Performed by: HOSPITALIST

## 2023-01-20 PROCEDURE — 97161 PT EVAL LOW COMPLEX 20 MIN: CPT | Mod: GP | Performed by: PHYSICAL THERAPIST

## 2023-01-20 PROCEDURE — 250N000013 HC RX MED GY IP 250 OP 250 PS 637: Performed by: HOSPITALIST

## 2023-01-20 PROCEDURE — 250N000011 HC RX IP 250 OP 636: Performed by: INTERNAL MEDICINE

## 2023-01-20 PROCEDURE — 80048 BASIC METABOLIC PNL TOTAL CA: CPT | Performed by: HOSPITALIST

## 2023-01-20 PROCEDURE — 250N000013 HC RX MED GY IP 250 OP 250 PS 637: Performed by: PHYSICIAN ASSISTANT

## 2023-01-20 PROCEDURE — 97116 GAIT TRAINING THERAPY: CPT | Mod: GP | Performed by: PHYSICAL THERAPIST

## 2023-01-20 PROCEDURE — 97535 SELF CARE MNGMENT TRAINING: CPT | Mod: GO

## 2023-01-20 PROCEDURE — 36415 COLL VENOUS BLD VENIPUNCTURE: CPT | Performed by: HOSPITALIST

## 2023-01-20 PROCEDURE — 120N000001 HC R&B MED SURG/OB

## 2023-01-20 RX ORDER — CARVEDILOL 25 MG/1
25 TABLET ORAL 2 TIMES DAILY WITH MEALS
Status: DISCONTINUED | OUTPATIENT
Start: 2023-01-21 | End: 2023-01-21

## 2023-01-20 RX ORDER — MAGNESIUM HYDROXIDE/ALUMINUM HYDROXICE/SIMETHICONE 120; 1200; 1200 MG/30ML; MG/30ML; MG/30ML
30 SUSPENSION ORAL EVERY 4 HOURS PRN
Status: DISCONTINUED | OUTPATIENT
Start: 2023-01-20 | End: 2023-01-27 | Stop reason: HOSPADM

## 2023-01-20 RX ORDER — FAMOTIDINE 10 MG
20 TABLET ORAL 2 TIMES DAILY PRN
Status: DISCONTINUED | OUTPATIENT
Start: 2023-01-20 | End: 2023-01-21

## 2023-01-20 RX ORDER — CALCIUM CARBONATE 500 MG/1
500-1000 TABLET, CHEWABLE ORAL 3 TIMES DAILY PRN
Status: DISCONTINUED | OUTPATIENT
Start: 2023-01-20 | End: 2023-01-27 | Stop reason: HOSPADM

## 2023-01-20 RX ADMIN — CARVEDILOL 12.5 MG: 12.5 TABLET, FILM COATED ORAL at 17:18

## 2023-01-20 RX ADMIN — CALCIUM CARBONATE (ANTACID) CHEW TAB 500 MG 500 MG: 500 CHEW TAB at 21:20

## 2023-01-20 RX ADMIN — CALCIUM CARBONATE (ANTACID) CHEW TAB 500 MG 500 MG: 500 CHEW TAB at 19:30

## 2023-01-20 RX ADMIN — TAMSULOSIN HYDROCHLORIDE 0.4 MG: 0.4 CAPSULE ORAL at 09:36

## 2023-01-20 RX ADMIN — AMLODIPINE BESYLATE 10 MG: 10 TABLET ORAL at 09:36

## 2023-01-20 RX ADMIN — CARVEDILOL 12.5 MG: 12.5 TABLET, FILM COATED ORAL at 09:36

## 2023-01-20 RX ADMIN — HYDRALAZINE HYDROCHLORIDE 10 MG: 20 INJECTION INTRAMUSCULAR; INTRAVENOUS at 19:44

## 2023-01-20 ASSESSMENT — ACTIVITIES OF DAILY LIVING (ADL)
ADLS_ACUITY_SCORE: 31
ADLS_ACUITY_SCORE: 28
ADLS_ACUITY_SCORE: 28
ADLS_ACUITY_SCORE: 31
ADLS_ACUITY_SCORE: 29
ADLS_ACUITY_SCORE: 31
ADLS_ACUITY_SCORE: 28
ADLS_ACUITY_SCORE: 28
ADLS_ACUITY_SCORE: 29

## 2023-01-20 NOTE — PROGRESS NOTES
[facility]    Hospitalist Progress Note         Assessment & Plan   Tc Garcia, 82 year old male with PMH of stage III chronic kidney disease, anemia, paroxysmal atrial fibrillation on anticoagulation, pulmonary hypertension, essential hypertension, type 2 diabetes mellitus on insulin pump presents with increasing weakness, dizziness and lightheadedness with new intraventricular hemorrhage.  ICU admission.       Intraventricular hemorrhage, right lateral ventricle  Essential hypertension, uncontrolled on admission  History of atrial fibrillation, on apixaban anticoagulation prior to admission, held;  rate controlled  New intraventricular hemorrhage, right-sided, potentially severe and life-threatening.  ICU admission.  Chronic anticoagulation prior to admission, apixaban for atrial fibrillation.  Patient reports history of multiple falls including prior to this admission, no known hit to head, patient states general imbalance with turning. Anticoagulation reversed in ER.  Neurosurgery and stroke neurology consulted.    Stroke Neurology; see note, initially required nicardipine drip in ICU, repeat head CT, MRI.Stroke Neurology to review/manage; stable    Neurosurgery consult, see note, no immediate surgical intervention, monitor    -1/18/2023 off PTA antihypertensive, BP climbing, serial restart and increase carvedilol to 12.5 mg twice daily, still BP above goal, start amlodipine, holding avapro due to ABBIE.  Monitor.    Reversal of anticoagulation taken prior to admission (apixaban/Eliquis); KCentra given in ER, after acute symptoms resolved, needs decision regarding risk/benefits of ongoing AC for A. fib given history of multiple falls and presentation with IVH.  1/18/2023 as mental status has significantly improved, initiate OT/PT to assess mobility status/fall risk that we will contribute to decision regarding  AC.    -; Tylenol as needed; avoid NSAID's due to bleeding; no pain issues.       Persistent restlessness, repeat head CT stable.  Did require two-point soft wrist restraints, no longer using, sitter present.  Started on Precedex, currently titrated off.  No pain issues, UA negative, chest CT negative for pneumonia.  Bladder scans with persistent urinary retention, Pruett placed, retention may be contributory to patient's agitation start Flomax.  With Pruett in place no further agitation, no need for sitter, mental status significantly improved.    MRI brain, ICH noted, notation of scattered acute-subacute appearing infarcts involving both hemisphere, citation several probable chronic microhemorrhage in the cerebellum.  MRI findings of cerebellum consistent with patient's multiple falls and sense of imbalance.  PT/OT assessment above.  With fall risk does not appear to be a candidate for ongoing AC for his atrial fibrillation.    Continued increase mentation, working with Therapies, recommend acute rehab facility.  On discussion today patient states no imbalance or falls which enters in on the decision regarding ICH, AC for A. fib and Stroke Neurology entertaining enrollment in Aspire study, defer to Stroke Neurology.    -BP remains elevated, further increase amlodipine dose to 10 mg, restarted on PTA carvedilol, Avapro held due to ABBIE.  Patient has as needed hydralazine.    -BP remains above goal, 150-160s/90s-100.  Increase carvedilol to 25 mg twice daily, monitor       Type 2 diabetes mellitus, insulin-dependent, insulin pump    Monitor blood sugars, continue PTA insulin pump, discontinue all other insulin Rx.      Patient eating with assistance, PTA patient on insulin pump, will hold off.  Transition off heparin drip, start low-dose Lantus and SSI, assess insulin requirements and adjust Lantus accordingly.  Pharm.D. to stop insulin drip once Lantus administered.  -Review of records indicates glucoses up to 335, further increase Lantus by 3 units, may need to start carb counting insulin.   Monitor.    Abnormal admission chest x-ray, patchy opacity lung bases  Indeterminate chest x-ray on admission, small patchy opacity lung bases, artifact versus pneumonia versus mass.  Denies cough, sputum, dyspnea.  Denies aspiration.    Chest CT with nodules that will require outpatient follow-up per protocol, no acute finding suggestive of pneumonia.     Chronic kidney disease, stage III    Monitor serum Cr and urine output; admission Cr 1.48    CR serially increased 1.71/max.  CR improved 1.51 after Cho placed, likely component of urinary retention..  Avoid nephrotoxins.  Creatinine continues to descend however still elevated 1.24, given history of urine retention maintain Cho, consider voiding trial over the weekend review of records 7 months test 1 year ago elevated CR 1.6-1.7.    Mild leukocytosis  WBC 16 on admission 10.7.  Afebrile.  No pyuria on UA.  Chest CT without acute findings of pneumonia.  Procalcitonin 0.20, hold off empiric antibiotics.  Monitor temp profile, afebrile, WBC serially decreasing today 12.4.  Recheck in AM.    DVT Prophylaxis: Pneumatic Compression Devices  Code Status: No CPR- Do NOT Intubate    Expected discharge >2days pending clinical improvement, cho out/voiding trial; Stroke Neurology plan established regarding aspire trial/AC, safe disposition, ARF      Pranav Ambrocio MD  Text Page (7am - 6pm, M-F)    Total time 50 minutes for today 1/20/2023 :  time consisted of the following, examination of patient, review of records including labs, imaging results, medications, interdisciplinary notes and completing documentation and orders.  Care Management included counseling/discussion with Patient regarding current condition including Therapies, diabetes, hypertension and Coordination of Care time with Nursing  and Specialists, Stroke Neurology regarding ICH care plan, management and surveillance.     Interval History   Continues to increase alertness, mentation appears at  baseline, no acute behavioral issues.  No new acute neurologic focality.  Pruett remains in place/ABBIE with urinary retention.  Working with Therapies,     SH: No tobacco.  Lives with wife.  See states wife with increasing age-related cognitive impairment.  Lives in 2-3 story home, children including 1 daughter Brittani in [place], Camille in [facility], son in [place] considering facility such as [place].    ROS: Complete ROS negative except as above.     -Data reviewed today: I reviewed all new labs and imaging results over the last 24 hours..    Physical Exam   Temp: 97.8  F (36.6  C) Temp src: Oral BP: (!) 158/99 Pulse: 84   Resp: 16 SpO2: 99 % O2 Device: None (Room air)    Vitals:    01/15/23 1954 01/15/23 2121 01/17/23 0500   Weight: 71.7 kg (158 lb) 66.8 kg (147 lb 4.3 oz) 68.8 kg (151 lb 10.8 oz)     Vital Signs with Ranges  Temp:  [97.8  F (36.6  C)-98.9  F (37.2  C)] 97.8  F (36.6  C)  Pulse:  [63-84] 84  Resp:  [16-18] 16  BP: (111-175)/() 158/99  SpO2:  [97 %-99 %] 99 %  I/O last 3 completed shifts:  In: 120 [P.O.:120]  Out: 950 [Urine:950]    General/Constitutional:     NAD, more alert, calm, cooperative    HEENT/Head Exam:  atraumatic  Chest/Respiratory: Respirations nonlabored room air  Cardiovascular:  no murmur appreciated.  LE edema trace  Gastrointestinal/Abdomen: Soft nontender  Neurologic:  gross CN and motor testing nonfocal.  Ambulation not tested.  Psychiatric:  Mental status: Orientation x 3 ; affect alert and calm       Medications     dextrose       sodium chloride Stopped (01/17/23 1545)       amLODIPine  10 mg Oral Daily     carvedilol  12.5 mg Oral BID w/meals     insulin aspart  1-7 Units Subcutaneous TID AC     insulin aspart  1-5 Units Subcutaneous At Bedtime     insulin glargine  12 Units Subcutaneous At Bedtime     tamsulosin  0.4 mg Oral Daily       Data   Recent Labs   Lab 01/20/23  1653 01/20/23  1213 01/20/23  0946 01/19/23  1139 01/19/23  0851 01/18/23  0738 01/18/23  0504  01/17/23  0508 01/17/23  0436 01/16/23  0905 01/16/23  0448 01/15/23  2141 01/15/23  1321   WBC  --   --   --   --   --   --  12.4*  --  16.0*  --  15.7*  --  10.7   HGB  --   --   --   --   --   --  12.6*  --  11.8*  --  12.1*  --  13.9   MCV  --   --   --   --   --   --  85  --  87  --  84  --  87   PLT  --   --   --   --   --   --  206  --  203  --  215  --  275   INR  --   --   --   --   --   --   --   --   --   --   --   --  1.13   NA  --   --  138  --  142  --  139  --  142  --  140  --  138   POTASSIUM  --   --  4.0  --  3.9  --  4.3  --  4.2  --  4.1  --  3.8   CHLORIDE  --   --  98  --  103  --  99  --  105  --  103  --  102   CO2  --   --  29  --  29  --  26  --  25  --  22  --  29   BUN  --   --  21.9  --  20.4  --  31.9*  --  36*  --  26  --  14   CR  --   --  1.24*  --  1.21*  --  1.51*  --  1.71*  --  1.51*  --  1.48*   ANIONGAP  --   --  11  --  10  --  14  --  12  --  15*  --  7   LLOYD  --   --  10.0  --  9.8  --  9.1  --  9.1  --  9.2  --  9.4   * 199* 214*   < > 159*   < > 265*   < > 220*   < > 431*   < > 244*   ALBUMIN  --   --   --   --   --   --   --   --   --   --   --   --  4.4   PROTTOTAL  --   --   --   --   --   --   --   --   --   --   --   --  7.1   BILITOTAL  --   --   --   --   --   --   --   --   --   --   --   --  1.6*   ALKPHOS  --   --   --   --   --   --   --   --   --   --   --   --  76   ALT  --   --   --   --   --   --   --   --   --   --   --   --  11   AST  --   --   --   --   --   --   --   --   --   --   --   --  11    < > = values in this interval not displayed.

## 2023-01-20 NOTE — PROGRESS NOTES
Reason for Admission: Non traumatic intraventricular hemorrhage      Cognitive/Mentation: A/Ox3  Neuros/CMS: No neuro changes, BUE tremors strength 4/5,  BLE 3/5, denies N/T  VS: BP elevated few times, prn hydralazine given once with good effect.   Tele: Afib CVR  GI: BS active, + flatus,   : Cho cath in place, good U/O  Pulmonary: LS clear.  Pain: No signs of pain. Prn Tylenol given for discomfort      Drains/Lines: PIV SL, cho cath  Skin: Intact   Activity: A1/GB/W  Diet:  Diabetic diet   Therapies recs: Pending  Discharge:  Pending clinical improvement      End of shift summary: Patient slept well overnight, stable throughout shift.     /61 (BP Location: Left arm, Patient Position: Right side)   Pulse 75   Temp 98.2  F (36.8  C) (Oral)   Resp 16   Ht 1.524 m (5')   Wt 68.8 kg (151 lb 10.8 oz)   SpO2 97%   BMI 29.62 kg/m

## 2023-01-20 NOTE — PROGRESS NOTES
01/20/23 1329   Appointment Info   Signing Clinician's Name / Credentials (PT) Nadia Mejia, PT   Living Environment   People in Home spouse   Current Living Arrangements house   Home Accessibility stairs to enter home   Number of Stairs, Main Entrance 5   Stair Railings, Main Entrance railing on right side (ascending)   Number of Stairs, Within Home, Primary greater than 10 stairs  (Chair lift up and down.)   Stair Railings, Within Home, Primary   (Stair lift.)   Transportation Anticipated car, drives self  (Patiet does all the driving.)   Living Environment Comments Patient is I with all ADL's and IADL's. He does most of the cooking and does all the yardwork. No AD.   Self-Care   Usual Activity Tolerance good   Regular Exercise Yes   Activity/Exercise Type other (see comments)  (Supine exercises.)   Equipment Currently Used at Home grab bar, toilet;grab bar, tub/shower;lift device;cane, straight  (Has a cane but doesn't use it.)   Fall history within last six months yes   Number of times patient has fallen within last six months 3   Activity/Exercise/Self-Care Comment One fall was slipping in the bathtub, another was turning outside and another was stepping down a curf.   General Information   Onset of Illness/Injury or Date of Surgery 01/15/23   Referring Physician Jessika Cordoba.   Patient/Family Therapy Goals Statement (PT) To go home.   Pertinent History of Current Problem (include personal factors and/or comorbidities that impact the POC) Tc Garcia, 82 year old male with PMH of stage III chronic kidney disease, anemia, paroxysmal atrial fibrillation on anticoagulation, pulmonary hypertension, essential hypertension, type 2 diabetes mellitus on insulin pump presents with increasing weakness, dizziness and lightheadedness with new intraventricular hemorrhage.   Cognition   Affect/Mental Status (Cognition) WFL   Orientation Status (Cognition) oriented x 3   Follows Commands (Cognition) WFL   Cognitive  Status Comments thought the month was February.   Pain Assessment   Patient Currently in Pain No   Integumentary/Edema   Integumentary/Edema no deficits were identifed   Posture    Posture Forward head position;Protracted shoulders;Kyphosis   Range of Motion (ROM)   Range of Motion ROM is WFL   Strength (Manual Muscle Testing)   Strength Comments Bilateral LE strength 5/5 in sitting. Able to do 3 heel raises in standing but needs min A for balance. Minimal functional weakness noted with stepping onto/off of a curb.   Bed Mobility   Comment, (Bed Mobility) Indpendent   Transfers   Comment, (Transfers) Independent   Gait/Stairs (Locomotion)   Distance in Feet (Gait) 500+   Comment, (Gait/Stairs) Gait without AD 10 feet in room. Slow speed with forward gaze but no jerome LOB.   Balance   Balance Comments Imparied with turns and with steps-needs use of rail. Good static standing without UE support.   Sensory Examination   Sensory Perception WNL   Coordination   Coordination no deficits were identified   Muscle Tone   Muscle Tone no deficits were identified   Clinical Impression   Criteria for Skilled Therapeutic Intervention Yes, treatment indicated   PT Diagnosis (PT) Impaired balance   Influenced by the following impairments Impaired balance   Functional limitations due to impairments Needed cues and assist for quick turns, on and off curb, stepping over obstacles.   Clinical Presentation (PT Evaluation Complexity) Stable/Uncomplicated   Clinical Presentation Rationale < 3 factors affecting mobility   Clinical Decision Making (Complexity) low complexity   Planned Therapy Interventions (PT) gait training;balance training   Risk & Benefits of therapy have been explained evaluation/treatment results reviewed;care plan/treatment goals reviewed;participants included;patient   PT Total Evaluation Time   PT Eval, Low Complexity Minutes (98150) 12   Physical Therapy Goals   PT Frequency Daily   PT Predicted Duration/Target  Date for Goal Attainment 01/22/23   PT Goals Gait;PT Goal 1;Stairs   PT: Gait Independent;Greater than 200 feet   PT: Stairs Modified independent;5 stairs;Rail on right;Goal Met   PT: Goal 1 Paient will score at least 20/24 on DGI   Interventions   Interventions Quick Adds Gait Training;Neuromuscular Re-ed   Gait Training   Gait Training Minutes (17845) 25   Symptoms Noted During/After Treatment (Gait Training) none   Treatment Detail/Skilled Intervention Gait without +feet, CGA initially progressed to supervision. Able to change speeds slightly without LOB. Able to stop and turns and stop without LOB but end of session. First time turning, one LOB requiring pateint to take an extra step to recover. Up and down 4 steps with a rail mod I. Stepped onto/off of curb times 10. 1 Jerome LOB. Educated to lead up with right and down with left and following this rule, patient able to go up and down with CGA. Obstacle course in the hallway. Able to step over hurdles with slightl instability but no jerome LOB, and go around objects without difficulty. Able to retrieve all objects from the floor without LOB.   [place]oca Level (Gait Training) contact guard  (Progressed to supervision.)   Physical Assistance Level (Gait Training) supervision   Stair Railings present on right side   Physical Assist/Nonphysical Assist (Stairs) supervision   Level of M[place] (Stairs) stand-by assist   PT Discharge Planning   PT Plan High level balance, steping onto/off of curbs, DGI and practice DGI tasks. Determine need for OPPT or not.   PT Discharge Recommendation (DC Rec) home with outpatient physical therapy   PT Rationale for DC Rec Patient appears very close to baseline and by end of session, amb with only supervision even while performing dynamic tasks. Currently recommend HHPT for higher level balance but pending length of stay may not need this. Will continue inpatient PT.   Total Session Time   Timed Code Treatment Minutes 25    Total Session Time (sum of timed and untimed services) 37

## 2023-01-20 NOTE — PLAN OF CARE
A&Ox3, disoriented to time but cognition appears to be improving. VSS on RA with exp of HTN, Hydralazine given x2 to maintain SBP <160. Neuros remain unchanged, baseline tremors in upper extremities and generalized weakness. Tele Afib. Tolerating mod carb diet, blood sugars managed with insulin. A1 with belt. Has been up in chair for meals today. PT recommending ARU/TCU. Discharge pending continued improvement.

## 2023-01-21 ENCOUNTER — APPOINTMENT (OUTPATIENT)
Dept: PHYSICAL THERAPY | Facility: CLINIC | Age: 83
DRG: 064 | End: 2023-01-21
Payer: COMMERCIAL

## 2023-01-21 ENCOUNTER — APPOINTMENT (OUTPATIENT)
Dept: OCCUPATIONAL THERAPY | Facility: CLINIC | Age: 83
DRG: 064 | End: 2023-01-21
Payer: COMMERCIAL

## 2023-01-21 LAB
ANION GAP SERPL CALCULATED.3IONS-SCNC: 10 MMOL/L (ref 7–15)
BUN SERPL-MCNC: 23.2 MG/DL (ref 8–23)
CALCIUM SERPL-MCNC: 10 MG/DL (ref 8.8–10.2)
CHLORIDE SERPL-SCNC: 97 MMOL/L (ref 98–107)
CREAT SERPL-MCNC: 1.21 MG/DL (ref 0.67–1.17)
DEPRECATED HCO3 PLAS-SCNC: 29 MMOL/L (ref 22–29)
GFR SERPL CREATININE-BSD FRML MDRD: 59 ML/MIN/1.73M2
GLUCOSE BLDC GLUCOMTR-MCNC: 268 MG/DL (ref 70–99)
GLUCOSE BLDC GLUCOMTR-MCNC: 274 MG/DL (ref 70–99)
GLUCOSE BLDC GLUCOMTR-MCNC: 318 MG/DL (ref 70–99)
GLUCOSE BLDC GLUCOMTR-MCNC: 325 MG/DL (ref 70–99)
GLUCOSE SERPL-MCNC: 353 MG/DL (ref 70–99)
POTASSIUM SERPL-SCNC: 4.2 MMOL/L (ref 3.4–5.3)
SODIUM SERPL-SCNC: 136 MMOL/L (ref 136–145)

## 2023-01-21 PROCEDURE — 120N000001 HC R&B MED SURG/OB

## 2023-01-21 PROCEDURE — 97750 PHYSICAL PERFORMANCE TEST: CPT | Mod: GP

## 2023-01-21 PROCEDURE — 250N000013 HC RX MED GY IP 250 OP 250 PS 637: Performed by: HOSPITALIST

## 2023-01-21 PROCEDURE — 99233 SBSQ HOSP IP/OBS HIGH 50: CPT | Performed by: HOSPITALIST

## 2023-01-21 PROCEDURE — 36415 COLL VENOUS BLD VENIPUNCTURE: CPT | Performed by: HOSPITALIST

## 2023-01-21 PROCEDURE — 97535 SELF CARE MNGMENT TRAINING: CPT | Mod: GO

## 2023-01-21 PROCEDURE — 97116 GAIT TRAINING THERAPY: CPT | Mod: GP

## 2023-01-21 PROCEDURE — 97530 THERAPEUTIC ACTIVITIES: CPT | Mod: GP

## 2023-01-21 PROCEDURE — 250N000011 HC RX IP 250 OP 636: Performed by: INTERNAL MEDICINE

## 2023-01-21 PROCEDURE — 97530 THERAPEUTIC ACTIVITIES: CPT | Mod: GO

## 2023-01-21 PROCEDURE — 80048 BASIC METABOLIC PNL TOTAL CA: CPT | Performed by: HOSPITALIST

## 2023-01-21 RX ORDER — FAMOTIDINE 20 MG/1
20 TABLET, FILM COATED ORAL DAILY PRN
Status: DISCONTINUED | OUTPATIENT
Start: 2023-01-21 | End: 2023-01-27 | Stop reason: HOSPADM

## 2023-01-21 RX ORDER — CLONIDINE HYDROCHLORIDE 0.1 MG/1
0.1 TABLET ORAL 2 TIMES DAILY
Status: DISCONTINUED | OUTPATIENT
Start: 2023-01-21 | End: 2023-01-21

## 2023-01-21 RX ORDER — CARVEDILOL 25 MG/1
50 TABLET ORAL 2 TIMES DAILY WITH MEALS
Status: DISCONTINUED | OUTPATIENT
Start: 2023-01-21 | End: 2023-01-23

## 2023-01-21 RX ADMIN — CARVEDILOL 25 MG: 25 TABLET, FILM COATED ORAL at 08:43

## 2023-01-21 RX ADMIN — AMLODIPINE BESYLATE 10 MG: 10 TABLET ORAL at 08:43

## 2023-01-21 RX ADMIN — TAMSULOSIN HYDROCHLORIDE 0.4 MG: 0.4 CAPSULE ORAL at 08:43

## 2023-01-21 RX ADMIN — HYDRALAZINE HYDROCHLORIDE 10 MG: 20 INJECTION INTRAMUSCULAR; INTRAVENOUS at 16:09

## 2023-01-21 RX ADMIN — ACETAMINOPHEN 650 MG: 325 TABLET, FILM COATED ORAL at 08:43

## 2023-01-21 RX ADMIN — HYDRALAZINE HYDROCHLORIDE 10 MG: 20 INJECTION INTRAMUSCULAR; INTRAVENOUS at 03:50

## 2023-01-21 RX ADMIN — CARVEDILOL 50 MG: 25 TABLET, FILM COATED ORAL at 17:58

## 2023-01-21 ASSESSMENT — ACTIVITIES OF DAILY LIVING (ADL)
ADLS_ACUITY_SCORE: 31

## 2023-01-21 NOTE — PROGRESS NOTES
Brief Stroke Note:  No new concerns described over night. Vascular neurology attending did not receive call back from cardiologist re: Aspire clinical trial ,presume will not be able to enroll patient prior to discharge unless call back is received.     Impression   Primary spontaneous R IPH-R lateral IVH small L lateral IVH with small associated SAH, moderate edema in setting of apixaban with poorly controlled HTN. ICH score: 2. Suspect pt has had ICH for days. 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    Plan  -Neuro checks and vitals every 4 hours  -patient should be on appropriate chemical VTE ppx; hold all other antiplatelet/anticoagulant medications  -Inpatient BP goal <160;  recommend adding oral hypertensives as needed to maintain BP goal  --Long term outpatient goal BP is <130/80 with tighter control associated with improved vascular outcomes  -Blood glucose monitoring, A1c 7.2% (recommend goal <7%), (BG continues to be elevated ranging 137-335 overnight), recommend maintain euglycemia <180 mg/dL, insulin gtt if needed to minimize brain edema  -PT/OT/SPT  -telemetry   -Euthermia, eunatremia   -Stroke Education  -possible ASPIRE trial candidate (ASA v AC for Afib patients with ICH), discussed and awaiting to hear back from primary cardiology team prior to enrollment, anticipate may not head back prior to discharge     Patient Follow-up    -F/u with PCP in 1-2 weeks  -F/u with neurology team in 4 weeks (ordered)  -F/u with neurosurgery team for meningioma (ordered)    No further stroke workup is recommended, we will sign off. Please contact us for additional  "questions or concerns.    Grace NELSON, CNP  Vascular Neurology  To page me or covering stroke neurology team member, click here: [facility]   Choose \"On Call\" tab at top, then search dropdown box for \"Neurology Adult\", select location, press Enter, then look for stroke/neuro ICU/telestroke.  "

## 2023-01-21 NOTE — PROGRESS NOTES
Date: January 20, 2023  Shift: [id number]  Name: Tc Garcia  Age: 82 year old  YOB: 1940  Date of Admission: 01/15/2023    Reason for Admission: Essential hypertension [I10]  Anticoagulated [Z79.01]  Right-sided nontraumatic intraventricular intracerebral hemorrhage (H) [I61.5]     Cognitive/Mentation: A/Ox3 - unsure of date/time  Neuros/CMS: UE tremors @ baseline. Generalized weakness.    VS: VSS on RA - prn hydralazine available if SBP >160  Cardiac: Tele Afib CVR, denies chest pain  GI: BS active, LBM 1/19  : WDL  Pulmonary: LS clear, denies SOB  Pain: Denies     Lines/Drains: PIV-SL  Skin: WDL  Activity: SBA GBW    Diet: Mod carb diet; insulin coverage given with meals     Therapies recs: PT/OT  Discharge: ARU vs TCU

## 2023-01-21 NOTE — PLAN OF CARE
Goal Outcome Evaluation:       a/o x4, hypertensive overnight /102 , PRN 10mg hydralazine given.  Will continue to monitor.

## 2023-01-21 NOTE — PROGRESS NOTES
Date: January 20, 2023  Shift: [id number]  Name: Tc Garcia  Age: 82 year old  YOB: 1940  Date of Admission: 01/15/2023     Reason for Admission: Essential hypertension [I10]  Anticoagulated [Z79.01]  Right-sided nontraumatic intraventricular intracerebral hemorrhage (H) [I61.5]     Cognitive/Mentation: A/Ox4  Neuros/CMS: UE tremors @ baseline. Generalized weakness.     VS: VSS on RA -Bp 169/90.  prn hydralazine 10 mg given . BP recheck 144/80. BP goal SBP >160.   Cardiac: Tele Afib CVR, denies chest pain  GI: BS active, LBM 1/19  : WDL  Pulmonary: LS clear   Pain: Denies     Lines/Drains: PIV-SL  Skin: WDL  Activity: SBA GBW     Diet: Mod carb diet; insulin coverage given.      Therapies recs: PT/OT  Discharge: ARU vs TCU

## 2023-01-21 NOTE — PLAN OF CARE
Summary: Intraventricular intracerebral hemorrhage    Date & Time: 9818-6785 23  Activity Level: SBA w GB/W  Fall Risk: yes, gets up frequently   Behavior & Aggression: Green  Pain Management: Denies  ABNL VS/O2: VSS on RA ex BP high  ABNL Lab/B  Diet: Mod carb  Bowel/Bladder: no BM  Drains/Devices: Pruett in place  Skin: skin tear on L hand   Anticipated  DC Date: Pending  Other Important Info: Bilateral upper extremity tremors

## 2023-01-21 NOTE — PROGRESS NOTES
[facility]    Hospitalist Progress Note         Assessment & Plan   Tc Garcia, 82 year old male with PMH of stage III chronic kidney disease, anemia, paroxysmal atrial fibrillation on anticoagulation, pulmonary hypertension, essential hypertension, type 2 diabetes mellitus on insulin pump presents with increasing weakness, dizziness and lightheadedness with new intraventricular hemorrhage.       Intraventricular hemorrhage, right lateral ventricle  Essential hypertension, uncontrolled on admission  History of atrial fibrillation, on apixaban anticoagulation prior to admission, held;  rate controlled  New intraventricular hemorrhage, right-sided, potentially severe and life-threatening.  ICU admission.  Chronic anticoagulation prior to admission, apixaban for atrial fibrillation.  Patient reports history of multiple falls including prior to this admission, no known hit to head, patient states general imbalance with turning. Anticoagulation reversed in ER.  Neurosurgery and stroke neurology consulted.    Stroke Neurology; see note, initially required nicardipine drip in ICU, repeat head CT, MRI.Stroke Neurology to review/manage; stable    Neurosurgery consult, see note, no immediate surgical intervention, monitor    -1/18/2023 off PTA antihypertensive, BP climbing, serial restart and increase carvedilol to 12.5 mg twice daily, still BP above goal, start amlodipine, holding avapro due to ABBIE.  Monitor.    Reversal of anticoagulation taken prior to admission (apixaban/Eliquis); KCentra given in ER, after acute symptoms resolved, needs decision regarding risk/benefits of ongoing AC for A. fib given history of multiple falls and presentation with IVH.  1/18/2023 as mental status has significantly improved, initiate OT/PT to assess mobility status/fall risk that we will contribute to decision regarding  AC.    Persistent restlessness, repeat head CT stable.   Started on Precedex, currently  titrated off.  No pain issues, UA negative, chest CT negative for pneumonia.  Bladder scans with persistent urinary retention, Pruett placed, retention may be contributory to patient's agitation start Flomax.  With Pruett in place no further agitation, no need for sitter, mental status significantly improved.    MRI brain, ICH noted, notation of scattered acute-subacute appearing infarcts involving both hemisphere, citation several probable chronic microhemorrhage in the cerebellum.  MRI findings of cerebellum consistent with patient's multiple falls and sense of imbalance.  PT/OT assessment above.  With fall risk does not appear to be a candidate for ongoing AC for his atrial fibrillation.    Continued increase mentation, working with Therapies, recommend acute rehab facility.  On discussion with start of PT patient states no imbalance or falls which enters in on the decision regarding ICH, AC for A. fib and Stroke Neurology entertaining enrollment in Aspire study, defer to Stroke Neurology.    -BP remains elevated, further increase amlodipine dose to 10 mg, restarted on PTA carvedilol, Avapro held due to ABBIE.  Patient has as needed hydralazine.    -BP remains above goal,.  Ordered increase carvedilol to 50  mg twice daily, monitor       Type 2 diabetes mellitus, insulin-dependent, insulin pump    Monitor blood sugars, continue PTA insulin pump, discontinue all other insulin Rx.      Patient eating with assistance, PTA patient on insulin pump, will hold off.  Transition off heparin drip, start low-dose Lantus and SSI, assess insulin requirements and adjust Lantus accordingly.  Pharm.D. to stop insulin drip once Lantus administered.  -Review of records indicates glucoses up to 335, further increase Lantus by 3 units,  -Glucoses remain high 270-353, ordered start carb counting insulin      Abnormal admission chest x-ray, patchy opacity lung bases  Indeterminate chest x-ray on admission, small patchy opacity lung bases,  artifact versus pneumonia versus mass.  Denies cough, sputum, dyspnea.  Denies aspiration.    Chest CT with nodules that will require outpatient follow-up per protocol, no acute finding suggestive of pneumonia.     Chronic kidney disease, stage III    Monitor serum Cr and urine output; admission Cr 1.48    CR serially increased 1.71/max.  CR improved 1.51 after Cho placed, likely component of urinary retention..  Avoid nephrotoxins.  Creatinine continues to descend now at 1.21; 7 months test 1 year ago elevated CR 1.6-1.7.    -Ordered DC Cho, with voiding trial.    Mild leukocytosis  WBC 16 on admission 10.7.  Afebrile.  No pyuria on UA.  Chest CT without acute findings of pneumonia.  Procalcitonin 0.20, hold off empiric antibiotics.  Monitor temp profile, afebrile, WBC serially decreasin 12.4.  Monitor.    DVT Prophylaxis: Pneumatic Compression Devices  Code Status: No CPR- Do NOT Intubate    Expected discharge >2days pending clinical improvement, cho out/voiding trial; Stroke Neurology plan established regarding aspire trial/AC, safe disposition, ARF      Pranav Ambrocio MD  Text Page (7am - 6pm, M-F)    Total time 50 minutes for today 1/21/2023 :  time consisted of the following, examination of patient, review of records including labs, imaging results, medications, interdisciplinary notes and completing documentation and orders.  Care Management included counseling/discussion with Patient regarding current condition including diet; diabetes; elevated bp and Coordination of Care time with Nursing re: VT and Specialists, stroke Neurology regarding decision regarding AC/aspire study care plan, management and surveillance.     Interval History   Continue increase alertness, mentation appears at baseline, participating with PT.  On encounter patient was not eating, stating he would benefit from mechanical soft diet, desires alternative food.  Discussed with nursing to assist with food choices; trial VT.     No new acute neurologic focality.        SH: No tobacco.  Lives with wife.  See states wife with increasing age-related cognitive impairment.  Lives in 2-3 story home, children including 1 daughter Brittani in [place], Camille in [facility], son in [place] considering facility such as [facility].    ROS: Complete ROS negative except as above.     -Data reviewed today: I reviewed all new labs and imaging results over the last 24 hours..    Physical Exam   Temp: 97.7  F (36.5  C) Temp src: Oral BP: (!) 140/81 Pulse: 79   Resp: 16 SpO2: 100 % O2 Device: None (Room air)    Vitals:    01/15/23 1954 01/15/23 2121 01/17/23 0500   Weight: 71.7 kg (158 lb) 66.8 kg (147 lb 4.3 oz) 68.8 kg (151 lb 10.8 oz)     Vital Signs with Ranges  Temp:  [97.7  F (36.5  C)-99.2  F (37.3  C)] 97.7  F (36.5  C)  Pulse:  [79-91] 79  Resp:  [16] 16  BP: (140-193)/() 140/81  SpO2:  [97 %-100 %] 100 %  I/O last 3 completed shifts:  In: 120 [P.O.:120]  Out: 1400 [Urine:1400]    General/Constitutional:    NAD, alert, calm, cooperative    HEENT/Head Exam:  atraumatic  Chest/Respiratory: Respirations nonlabored room air  Cardiovascular:  no murmur appreciated.  LE edema none  Gastrointestinal/Abdomen: Soft nontender  Neurologic:  gross CN and motor testing nonfocal.  Ambulation not tested.  Psychiatric:  Mental status: Orientation x 3 ; affect calm       Medications     dextrose       sodium chloride Stopped (01/17/23 1545)       amLODIPine  10 mg Oral Daily     carvedilol  50 mg Oral BID w/meals     insulin aspart  1-7 Units Subcutaneous TID AC     insulin aspart  1-5 Units Subcutaneous At Bedtime     insulin glargine  17 Units Subcutaneous At Bedtime     tamsulosin  0.4 mg Oral Daily       Data   Recent Labs   Lab 01/21/23  1256 01/21/23  1049 01/21/23  0814 01/20/23  1213 01/20/23  0946 01/19/23  1139 01/19/23  0851 01/18/23  0738 01/18/23  0504 01/17/23  0508 01/17/23  0436 01/16/23  0905 01/16/23  0448 01/15/23  2141 01/15/23  1321   WBC  --   --   --    --   --   --   --   --  12.4*  --  16.0*  --  15.7*  --  10.7   HGB  --   --   --   --   --   --   --   --  12.6*  --  11.8*  --  12.1*  --  13.9   MCV  --   --   --   --   --   --   --   --  85  --  87  --  84  --  87   PLT  --   --   --   --   --   --   --   --  206  --  203  --  215  --  275   INR  --   --   --   --   --   --   --   --   --   --   --   --   --   --  1.13   NA  --  136  --   --  138  --  142  --  139  --  142  --  140  --  138   POTASSIUM  --  4.2  --   --  4.0  --  3.9  --  4.3   < > 4.2  --  4.1  --  3.8   CHLORIDE  --  97*  --   --  98  --  103  --  99   < > 105  --  103  --  102   CO2  --  29  --   --  29  --  29  --  26   < > 25  --  22  --  29   BUN  --  23.2*  --   --  21.9  --  20.4  --  31.9*   < > 36*  --  26  --  14   CR  --  1.21*  --   --  1.24*  --  1.21*  --  1.51*  --  1.71*  --  1.51*  --  1.48*   ANIONGAP  --  10  --   --  11  --  10  --  14   < > 12  --  15*  --  7   LLOYD  --  10.0  --   --  10.0  --  9.8  --  9.1  --  9.1  --  9.2  --  9.4   * 353* 268*   < > 214*   < > 159*   < > 265*   < > 220*   < > 431*   < > 244*   ALBUMIN  --   --   --   --   --   --   --   --   --   --   --   --   --   --  4.4   PROTTOTAL  --   --   --   --   --   --   --   --   --   --   --   --   --   --  7.1   BILITOTAL  --   --   --   --   --   --   --   --   --   --   --   --   --   --  1.6*   ALKPHOS  --   --   --   --   --   --   --   --   --   --   --   --   --   --  76   ALT  --   --   --   --   --   --   --   --   --   --   --   --   --   --  11   AST  --   --   --   --   --   --   --   --   --   --   --   --   --   --  11    < > = values in this interval not displayed.

## 2023-01-22 ENCOUNTER — APPOINTMENT (OUTPATIENT)
Dept: PHYSICAL THERAPY | Facility: CLINIC | Age: 83
DRG: 064 | End: 2023-01-22
Payer: COMMERCIAL

## 2023-01-22 ENCOUNTER — APPOINTMENT (OUTPATIENT)
Dept: OCCUPATIONAL THERAPY | Facility: CLINIC | Age: 83
DRG: 064 | End: 2023-01-22
Payer: COMMERCIAL

## 2023-01-22 LAB
ERYTHROCYTE [DISTWIDTH] IN BLOOD BY AUTOMATED COUNT: 14.7 % (ref 10–15)
GLUCOSE BLDC GLUCOMTR-MCNC: 116 MG/DL (ref 70–99)
GLUCOSE BLDC GLUCOMTR-MCNC: 127 MG/DL (ref 70–99)
GLUCOSE BLDC GLUCOMTR-MCNC: 169 MG/DL (ref 70–99)
GLUCOSE BLDC GLUCOMTR-MCNC: 321 MG/DL (ref 70–99)
GLUCOSE BLDC GLUCOMTR-MCNC: 98 MG/DL (ref 70–99)
HCT VFR BLD AUTO: 45.4 % (ref 40–53)
HGB BLD-MCNC: 14.3 G/DL (ref 13.3–17.7)
MCH RBC QN AUTO: 27 PG (ref 26.5–33)
MCHC RBC AUTO-ENTMCNC: 31.5 G/DL (ref 31.5–36.5)
MCV RBC AUTO: 86 FL (ref 78–100)
PLATELET # BLD AUTO: 273 10E3/UL (ref 150–450)
RBC # BLD AUTO: 5.3 10E6/UL (ref 4.4–5.9)
WBC # BLD AUTO: 14.6 10E3/UL (ref 4–11)

## 2023-01-22 PROCEDURE — 250N000011 HC RX IP 250 OP 636: Performed by: HOSPITALIST

## 2023-01-22 PROCEDURE — 97112 NEUROMUSCULAR REEDUCATION: CPT | Mod: GO

## 2023-01-22 PROCEDURE — 85027 COMPLETE CBC AUTOMATED: CPT | Performed by: HOSPITALIST

## 2023-01-22 PROCEDURE — 250N000013 HC RX MED GY IP 250 OP 250 PS 637: Performed by: HOSPITALIST

## 2023-01-22 PROCEDURE — 97535 SELF CARE MNGMENT TRAINING: CPT | Mod: GO

## 2023-01-22 PROCEDURE — 97530 THERAPEUTIC ACTIVITIES: CPT | Mod: GP

## 2023-01-22 PROCEDURE — 97116 GAIT TRAINING THERAPY: CPT | Mod: GP

## 2023-01-22 PROCEDURE — 120N000001 HC R&B MED SURG/OB

## 2023-01-22 PROCEDURE — 99232 SBSQ HOSP IP/OBS MODERATE 35: CPT | Performed by: HOSPITALIST

## 2023-01-22 PROCEDURE — 36415 COLL VENOUS BLD VENIPUNCTURE: CPT | Performed by: HOSPITALIST

## 2023-01-22 RX ORDER — HEPARIN SODIUM 5000 [USP'U]/.5ML
5000 INJECTION, SOLUTION INTRAVENOUS; SUBCUTANEOUS EVERY 12 HOURS
Status: DISCONTINUED | OUTPATIENT
Start: 2023-01-22 | End: 2023-01-27 | Stop reason: HOSPADM

## 2023-01-22 RX ORDER — DOXAZOSIN 1 MG/1
1 TABLET ORAL DAILY
Status: DISCONTINUED | OUTPATIENT
Start: 2023-01-22 | End: 2023-01-23

## 2023-01-22 RX ADMIN — ACETAMINOPHEN 650 MG: 325 TABLET, FILM COATED ORAL at 04:18

## 2023-01-22 RX ADMIN — HEPARIN SODIUM 5000 UNITS: 5000 INJECTION, SOLUTION INTRAVENOUS; SUBCUTANEOUS at 22:08

## 2023-01-22 RX ADMIN — CARVEDILOL 50 MG: 25 TABLET, FILM COATED ORAL at 17:59

## 2023-01-22 RX ADMIN — DOXAZOSIN 1 MG: 1 TABLET ORAL at 10:26

## 2023-01-22 RX ADMIN — AMLODIPINE BESYLATE 10 MG: 10 TABLET ORAL at 09:50

## 2023-01-22 RX ADMIN — TAMSULOSIN HYDROCHLORIDE 0.4 MG: 0.4 CAPSULE ORAL at 09:50

## 2023-01-22 RX ADMIN — CARVEDILOL 50 MG: 25 TABLET, FILM COATED ORAL at 09:51

## 2023-01-22 RX ADMIN — HEPARIN SODIUM 5000 UNITS: 5000 INJECTION, SOLUTION INTRAVENOUS; SUBCUTANEOUS at 09:50

## 2023-01-22 ASSESSMENT — ACTIVITIES OF DAILY LIVING (ADL)
ADLS_ACUITY_SCORE: 33
ADLS_ACUITY_SCORE: 31
ADLS_ACUITY_SCORE: 33
ADLS_ACUITY_SCORE: 31
ADLS_ACUITY_SCORE: 33
ADLS_ACUITY_SCORE: 31
ADLS_ACUITY_SCORE: 33
ADLS_ACUITY_SCORE: 31

## 2023-01-22 NOTE — PLAN OF CARE
A&O x4, forgetful at times but answers questions appropriately when asked. Neuros intact x slight baseline upper extremity tremor. VSS on RA. Up w/ Ax1, GB/W. Tele: afib CVR. Denied pain. Voiding. Tolerating mod carb diet. Takes pills whole. Alarms on. Plan for discharge to TCU vs .

## 2023-01-22 NOTE — PLAN OF CARE
Reason for Admission: Intraventricular hemorrhage    Cognitive/Mentation: A/Ox 3-4, forgetful  Neuros/CMS: Intact ex slow to respond at times, generalized weakness  VS: stable on RA. SBP <160   Tele: Afib CVR.  GI: BS active, passing flatus, no BM overnight  : Voiding trail overnight, voided in BR x1 w PVR of 128ml. Continent.  Pulmonary: LS clear.  Pain: c/o headache, tylenol admin.     Drains/Lines: PIV SL  Skin: R hand skin tear mepilex intact.   Activity: Assist x 1 with GB W.  Diet: Mod Carb with thin liquids. Takes pills whole.     Therapies recs: Home w outpt therapy/home care  Discharge: pending    Aggression Stoplight Tool: green    End of shift summary: stable overnight. Impulsive at times.

## 2023-01-22 NOTE — PROGRESS NOTES
[facility]    Hospitalist Progress Note         Assessment & Plan   Tc Garcia, 82 year old male with PMH of stage III chronic kidney disease, anemia, paroxysmal atrial fibrillation on anticoagulation, pulmonary hypertension, essential hypertension, type 2 diabetes mellitus on insulin pump presents with increasing weakness, dizziness and lightheadedness with new intraventricular hemorrhage.       Intraventricular hemorrhage, right lateral ventricle  Hypertension, urgency on admission, started on nicardipine drip.  History of atrial fibrillation, on apixaban anticoagulation prior to admission, held;  rate controlled  New intraventricular hemorrhage, right-sided, potentially severe and life-threatening.   Chronic anticoagulation prior to admission, apixaban for atrial fibrillation.  Patient reports history of multiple falls including prior to this admission, no known hit to head, patient states general imbalance with turning. Anticoagulation reversed in ER.      Stroke Neurology; see note, initially required nicardipine drip,, repeat head CT, MRI.Stroke Neurology to review/manage; stable    Neurosurgery consult, see note, no immediate surgical intervention, monitor    -1/18/2023 off PTA antihypertensive, BP climbing, serial restart and increase carvedilol to 12.5 mg twice daily, still BP above goal, start amlodipine, holding avapro due to ABBIE.  Monitor.     after acute symptoms resolved, needs decision regarding risk/benefits of ongoing AC for A. fib given history of multiple falls and presentation with IVH.  1/18/2023 as mental status has significantly improved, initiate OT/PT to assess mobility status/fall risk that we will contribute to decision regarding  AC.    Continued increase mentation, working with Therapies, recommend acute rehab facility.  On discussion with start of PT patient states no imbalance or falls which enters in on the decision regarding AC for A. Fib given ICH and  Stroke Neurology entertaining enrollment in Aspire study, defer to Stroke Neurology.    -Started on chemical DVT prophylaxis per Stroke neurology; SQH    -BP remains elevated amlodipine dose to 10 mg, restarted on PTA carvedilol, Avapro held due to ABBIE; doxycycline 1 mg.  Patient has as needed hydralazine.    Continues compliance with PT, decision regarding AC/Asprie trial deferring to Stroke Neurology, SW working on disposition ARF versus TCU.       Type 2 diabetes mellitus, insulin-dependent, insulin pump    patient eating; PTA patient on insulin pump, will hold off.  Transition off heparin drip, start low-dose Lantus and SSI, assess insulin requirements and adjust Lantus accordingly.  Pharm.D. to stop insulin drip once Lantus administered.  -Review of records indicates glucose above goal, serial increase Lantus, continue SSI, continued hyperglycemia therefore yesterday started carb counting with meals 1: 20, this morning 98, be sure that carbohydrates calculated on meal choices and bases of novolog dose that patient actually eats.  Close monitor      Abnormal admission chest x-ray, patchy opacity lung bases  Indeterminate chest x-ray on admission, small patchy opacity lung bases, artifact versus pneumonia versus mass.  Denies cough, sputum, dyspnea.  Denies aspiration.    Chest CT with nodules that will require outpatient follow-up per protocol, no acute finding suggestive of pneumonia.     Chronic kidney disease, stage III    Monitor serum Cr and urine output; admission Cr 1.48    CR serially increased 1.71/max.  CR improved 1.51 after Pruett placed, likely component of urinary retention..  Avoid nephrotoxins.  Creatinine continues to descend now at 1.21; 7 months to 1 year ago CR 1.6-1.7.    -Ordered DC Pruett, voiding trial, nurses report no elevated bladder scan.  Recheck CR/BMP in a.m.    Mild leukocytosis  WBC 16 on admission 10.7-14.6.  Afebrile.  No pyuria on UA.  Chest CT without acute findings of  pneumonia.  Procalcitonin 0.20, hold off empiric antibiotics.  Monitor temp profile, afebrile, WBC.  Monitor.    DVT Prophylaxis: Pneumatic Compression Devices; SQH per Stroke Neuro  Code Status: No CPR- Do NOT Intubate    Expected discharge >2days pending clinical improvement, Stroke Neurology plan established including Aspire trial/AC, safe disposition >ARF    Pranav Ambrocio MD  Text Page (7am - 6pm, M-F)    I spent 35 minutes total time in management of care today 1/22/2023 reviewing labs, medications, interdisciplinary notes; and completing documentation of encounter and orders with Care Management including counseling/discussion withthe Patient regarding po intake and DM/carb counting; VT, discharge planning and Coordinating Care and plan with Nursing and Specialists, StrokeNeuro regarding ICH management and surveillance       Interval History   Continued increase alertness, mentation appears at baseline, participating with PT.  Patient more comfortable with mechanical soft diet, this morning glucose somewhat tight, needs close monitor on start of carb counting and that patient actually eats what he orders and for which NovoLog is based.  Nursing reports no elevated bladder scans on voiding trial.   No new acute neurologic focality.        SH: No tobacco.  Lives with wife.  See states wife with increasing age-related cognitive impairment.  Lives in 2-3 story home, children including 1 daughter Brittani in [place], Camille in [facility], son in [place] considering facility such as [place].    ROS: Complete ROS negative except as above.     -Data reviewed today: I reviewed all new labs and imaging results over the last 24 hours..    Physical Exam   Temp: 97.8  F (36.6  C) Temp src: Oral BP: 133/77 Pulse: 73   Resp: 16 SpO2: 100 % O2 Device: None (Room air)    Vitals:    01/15/23 1954 01/15/23 2121 01/17/23 0500   Weight: 71.7 kg (158 lb) 66.8 kg (147 lb 4.3 oz) 68.8 kg (151 lb 10.8 oz)     Vital Signs with Ranges  Temp:   [97.4  F (36.3  C)-98.6  F (37  C)] 97.8  F (36.6  C)  Pulse:  [73-88] 73  Resp:  [16-18] 16  BP: (133-162)/() 133/77  SpO2:  [96 %-100 %] 100 %  I/O last 3 completed shifts:  In: -   Out: 475 [Urine:475]    General/Constitutional:     NAD, alert, calm, cooperative    HEENT/Head Exam:  atraumatic  Chest/Respiratory: Respirations nonlabored room air  Cardiovascular:  no murmur appreciated.  LE edema none  Gastrointestinal/Abdomen: Soft nontender  Neurologic:  gross CN and motor testing nonfocal.  Ambulation not tested.  Psychiatric:  Mental status: Orientation x 3 ; affect calm       Medications     dextrose       - MEDICATION INSTRUCTIONS -       sodium chloride Stopped (01/17/23 1545)       amLODIPine  10 mg Oral Daily     carvedilol  50 mg Oral BID w/meals     doxazosin  1 mg Oral Daily     heparin ANTICOAGULANT  5,000 Units Subcutaneous Q12H     insulin aspart   Subcutaneous TID w/meals     insulin aspart  1-7 Units Subcutaneous TID AC     insulin aspart  1-5 Units Subcutaneous At Bedtime     insulin glargine  17 Units Subcutaneous At Bedtime     tamsulosin  0.4 mg Oral Daily       Data   Recent Labs   Lab 01/22/23  0831 01/22/23  0806 01/22/23  0203 01/21/23  2118 01/21/23  1256 01/21/23  1049 01/20/23  1213 01/20/23  0946 01/19/23  1139 01/19/23  0851 01/18/23  0738 01/18/23  0504 01/17/23  0508 01/17/23  0436 01/15/23  2141 01/15/23  1321   WBC  --  14.6*  --   --   --   --   --   --   --   --   --  12.4*  --  16.0*   < > 10.7   HGB  --  14.3  --   --   --   --   --   --   --   --   --  12.6*  --  11.8*   < > 13.9   MCV  --  86  --   --   --   --   --   --   --   --   --  85  --  87   < > 87   PLT  --  273  --   --   --   --   --   --   --   --   --  206  --  203   < > 275   INR  --   --   --   --   --   --   --   --   --   --   --   --   --   --   --  1.13   NA  --   --   --   --   --  136  --  138  --  142  --  139  --  142   < > 138   POTASSIUM  --   --   --   --   --  4.2  --  4.0  --  3.9  --  4.3    < > 4.2   < > 3.8   CHLORIDE  --   --   --   --   --  97*  --  98  --  103  --  99   < > 105   < > 102   CO2  --   --   --   --   --  29  --  29  --  29  --  26   < > 25   < > 29   BUN  --   --   --   --   --  23.2*  --  21.9  --  20.4  --  31.9*   < > 36*   < > 14   CR  --   --   --   --   --  1.21*  --  1.24*  --  1.21*  --  1.51*  --  1.71*   < > 1.48*   ANIONGAP  --   --   --   --   --  10  --  11  --  10  --  14   < > 12   < > 7   LLOYD  --   --   --   --   --  10.0  --  10.0  --  9.8  --  9.1  --  9.1   < > 9.4   GLC 98  --  127* 318*   < > 353*   < > 214*   < > 159*   < > 265*   < > 220*   < > 244*   ALBUMIN  --   --   --   --   --   --   --   --   --   --   --   --   --   --   --  4.4   PROTTOTAL  --   --   --   --   --   --   --   --   --   --   --   --   --   --   --  7.1   BILITOTAL  --   --   --   --   --   --   --   --   --   --   --   --   --   --   --  1.6*   ALKPHOS  --   --   --   --   --   --   --   --   --   --   --   --   --   --   --  76   ALT  --   --   --   --   --   --   --   --   --   --   --   --   --   --   --  11   AST  --   --   --   --   --   --   --   --   --   --   --   --   --   --   --  11    < > = values in this interval not displayed.

## 2023-01-22 NOTE — PLAN OF CARE
January 21, 2023  Shift:[id number]  Tc Garcia  82 year old  YOB: 1940    Reason for admission:Essential hypertension [I10]  Anticoagulated [Z79.01]  Right-sided nontraumatic intraventricular intracerebral hemorrhage (H) [I61.5]    Cognition/Mentation:A&Ox4   Neuros/CMS:BUE tremors  VS:VSS ex for elevated BP hydralazine given x 1 on RA  Cardiac:afib CVR  GI:WDL  :Pruett removed for voiding trial at 1600  Pulmonary:WDL  Pain:denies pain  Drains/Lines:PIV SL   Activity:SBA with GB and walker  Diet:Moderate carb diet easy to chew  Discharge:pending

## 2023-01-23 ENCOUNTER — APPOINTMENT (OUTPATIENT)
Dept: OCCUPATIONAL THERAPY | Facility: CLINIC | Age: 83
DRG: 064 | End: 2023-01-23
Payer: COMMERCIAL

## 2023-01-23 LAB
GLUCOSE BLDC GLUCOMTR-MCNC: 102 MG/DL (ref 70–99)
GLUCOSE BLDC GLUCOMTR-MCNC: 126 MG/DL (ref 70–99)
GLUCOSE BLDC GLUCOMTR-MCNC: 178 MG/DL (ref 70–99)
GLUCOSE BLDC GLUCOMTR-MCNC: 201 MG/DL (ref 70–99)
GLUCOSE BLDC GLUCOMTR-MCNC: 272 MG/DL (ref 70–99)

## 2023-01-23 PROCEDURE — 250N000011 HC RX IP 250 OP 636: Performed by: HOSPITALIST

## 2023-01-23 PROCEDURE — 250N000013 HC RX MED GY IP 250 OP 250 PS 637: Performed by: HOSPITALIST

## 2023-01-23 PROCEDURE — 250N000013 HC RX MED GY IP 250 OP 250 PS 637: Performed by: INTERNAL MEDICINE

## 2023-01-23 PROCEDURE — 97535 SELF CARE MNGMENT TRAINING: CPT | Mod: GO

## 2023-01-23 PROCEDURE — 99233 SBSQ HOSP IP/OBS HIGH 50: CPT | Performed by: INTERNAL MEDICINE

## 2023-01-23 PROCEDURE — 97530 THERAPEUTIC ACTIVITIES: CPT | Mod: GO

## 2023-01-23 PROCEDURE — 120N000001 HC R&B MED SURG/OB

## 2023-01-23 RX ORDER — TAMSULOSIN HYDROCHLORIDE 0.4 MG/1
0.4 CAPSULE ORAL DAILY
Status: DISCONTINUED | OUTPATIENT
Start: 2023-01-24 | End: 2023-01-27 | Stop reason: HOSPADM

## 2023-01-23 RX ORDER — IRBESARTAN 150 MG/1
150 TABLET ORAL AT BEDTIME
Status: DISCONTINUED | OUTPATIENT
Start: 2023-01-23 | End: 2023-01-25

## 2023-01-23 RX ORDER — NICOTINE POLACRILEX 4 MG
15-30 LOZENGE BUCCAL
Status: DISCONTINUED | OUTPATIENT
Start: 2023-01-23 | End: 2023-01-27 | Stop reason: HOSPADM

## 2023-01-23 RX ORDER — DEXTROSE MONOHYDRATE 25 G/50ML
25-50 INJECTION, SOLUTION INTRAVENOUS
Status: DISCONTINUED | OUTPATIENT
Start: 2023-01-23 | End: 2023-01-27 | Stop reason: HOSPADM

## 2023-01-23 RX ORDER — CARVEDILOL 25 MG/1
25 TABLET ORAL 2 TIMES DAILY WITH MEALS
Status: DISCONTINUED | OUTPATIENT
Start: 2023-01-23 | End: 2023-01-24

## 2023-01-23 RX ADMIN — IRBESARTAN 150 MG: 150 TABLET ORAL at 21:51

## 2023-01-23 RX ADMIN — HEPARIN SODIUM 5000 UNITS: 5000 INJECTION, SOLUTION INTRAVENOUS; SUBCUTANEOUS at 08:19

## 2023-01-23 RX ADMIN — AMLODIPINE BESYLATE 10 MG: 10 TABLET ORAL at 08:19

## 2023-01-23 RX ADMIN — CARVEDILOL 50 MG: 25 TABLET, FILM COATED ORAL at 08:19

## 2023-01-23 RX ADMIN — TAMSULOSIN HYDROCHLORIDE 0.4 MG: 0.4 CAPSULE ORAL at 08:19

## 2023-01-23 RX ADMIN — HEPARIN SODIUM 5000 UNITS: 5000 INJECTION, SOLUTION INTRAVENOUS; SUBCUTANEOUS at 21:51

## 2023-01-23 RX ADMIN — ACETAMINOPHEN 650 MG: 325 TABLET, FILM COATED ORAL at 12:21

## 2023-01-23 RX ADMIN — CARVEDILOL 25 MG: 25 TABLET, FILM COATED ORAL at 17:56

## 2023-01-23 RX ADMIN — DOXAZOSIN 1 MG: 1 TABLET ORAL at 08:19

## 2023-01-23 ASSESSMENT — ACTIVITIES OF DAILY LIVING (ADL)
ADLS_ACUITY_SCORE: 31
ADLS_ACUITY_SCORE: 33
ADLS_ACUITY_SCORE: 33
ADLS_ACUITY_SCORE: 31
ADLS_ACUITY_SCORE: 31
ADLS_ACUITY_SCORE: 33
ADLS_ACUITY_SCORE: 31
ADLS_ACUITY_SCORE: 33
ADLS_ACUITY_SCORE: 31
ADLS_ACUITY_SCORE: 31
ADLS_ACUITY_SCORE: 33
ADLS_ACUITY_SCORE: 31

## 2023-01-23 NOTE — PROGRESS NOTES
"SPIRITUAL HEALTH SERVICES Progress Note    [facility]    Saw pt Tc Garcia per length of stay protocol.      Patient/Family Understanding of Illness and Goals of Care - Today, cT was awake and was able to engage in conversation. He spoke slowly and noted that he understands that he arrived to [facility] due to a stroke and bleeding. As he talked about it, he notes that this was a \"scary\" experience to go through, but he is happy with the improvements he's made so far.        Distress and Loss - Today, Tc notes that he's really concerned about his upcoming discharge plan. He reports that he expected to leave today, but is now awaiting more info about when/where that discharge will be. He also described some stress over his spouse managing everything at home while also checking-in on him.        Strengths, Coping, and Resources - Tc stated \"I have all of the support that I need.\" He spoke of having his spouse Radha and one of his daughters living locally. He also noted that he has a son that is living out of town, but has been checking in on him.        Meaning, Beliefs, and Spirituality - This was not assessed in detail at this time. I introduced Tc to  support services and he denied any support needs today.       Plan of Care -  remains available to support Tc and I will continue to follow while on unit 73. Please consult if any immediate needs arise.     ------  Angel Coleman M.Div.  Resident   Pager: [phone]  "

## 2023-01-23 NOTE — PLAN OF CARE
Reason for Admission: Intraventricular hemorrhage  Cognitive/Mentation: A/Ox 4  Neuros/CMS: Intact ex slow to respond at times, generalized weakness & BUE tremor  VS: stable on RA. SBP <160   Tele: Afib CVR.  GI: BS active, passing flatus, no BM overnight  : Continent.  PVR 100s.   Pulmonary: LS clear.  Pain: denies  Drains/Lines: PIV SL  Skin: R hand skin tear mepilex intact.   Activity: Assist x 1 with GB W.  Diet: Mod Carb with thin liquids. Takes pills whole.   Therapies recs: TCU vs Home care  Discharge: pending  Aggression Stoplight Tool: green  End of shift summary: stable overnight.

## 2023-01-23 NOTE — PROGRESS NOTES
[facility]    Medicine Progress Note - Hospitalist Service    Date of Admission:  1/15/2023    Assessment & Plan    Tc Garcia, 82 year old male with PMH of stage III chronic kidney disease, anemia, paroxysmal atrial fibrillation on anticoagulation, pulmonary hypertension, essential hypertension, type 2 diabetes mellitus on insulin pump presents with increasing weakness, dizziness and lightheadedness with new intraventricular hemorrhage.     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 CT imaging in the ED showed large intraventricular hemorrhage of the right lateral ventricle.  He received Kcentra for anticoagulation reversal. Patient reports history of multiple falls including prior to this admission, no known hit to head, patient states general imbalance with turning. Anticoagulation reversed in ER.    *Needed nicardipine drip for blood pressure control, now on oral medications.   *Follow-up CT head on 1/16 showed stable hemorrhage.  *MRI on 118, showed stable parenchymal hemorrhage in the right parietal white matter with intraventricular extension into the right lateral ventricle with a small amount of hemorrhage in the left lateral ventricle.  Also noted for scattered area of acute/subacute appearing infarcts within both cerebral hemispheres in the right basal ganglia.  Also noted dural based enhancing lesion over the right cerebral convexity most likely representing meningioma.   * after acute symptoms resolved, needs decision regarding risk/benefits of  ongoing AC for A. fib given history of multiple falls and presentation with IVH.  - BP management as below  - started on chem dvt prophylaxis with subcutaneous heparin per neurology.  Recommend continuing to hold all other antiplatelet/anticoagulation  -PT/OT, rec TCU  -Stroke neurology considering possible enrollment in ASPIRE trial (ASA v AC for Afib patients with ICH), they are awaiting to hear back from primary cardiology team prior to enrollment.  Defer to neurology    Hypertension  Presented with hypertensive urgency as above.  He had initially in the car needed nicardipine drip in the ICU which has since been weaned off.  -Currently on amlodipine 10 mg daily  -PTA carvedilol increased to 50 mg 2 times daily (reduce back to 25mg BID, HR most recent 58)  -Doxazosin 1 mg daily added on 1/22-we will stop this and restart his Avapro on 1/24  - per Neurology- inpatient BP goal <160;   -Long term outpatient goal BP is <130/80   -Blood pressure is currently well controlled        Type 2 diabetes mellitus, insulin-dependent, insulin pump  Hold off on insulin pump at this time given above issues.   - am blood glucose 102 and last evening was in the 300s  -Continue with Lantus 17 units at bedtime  -Adjust aspart to 1 units per 15 g of carb with lunch and dinner, continue with aspart 1 units per 20 g of carb with breakfast  -Continue with sliding scale insulin  -Monitor for hypoglycemia        Abnormal admission chest x-ray, patchy opacity lung bases  Indeterminate chest x-ray on admission, small patchy opacity lung bases, artifact versus pneumonia versus mass.  Denies cough, sputum, dyspnea.  Denies aspiration.  *Chest CT with nodules that will require outpatient follow-up per protocol, no acute finding suggestive of pneumonia.     Chronic kidney disease, stage III  Chart reviewed, creatinine appears to range between 1.4-1.7 over the last 2 years  -Creatinine 1.21 on 1/21, but has ranged between 1.5-1.7 during this  hospital stay which appears to be his baseline  -Monitor renal function    Possible urinary retention  -Had intermittently required straight catheterization per report  -On Flomax, will continue  -Monitor for urinary retention, per report has been voiding yesterday         Mild leukocytosis  WBC 16 on admission 10.7-14.6.  Afebrile.  No pyuria on UA.  Chest CT without acute findings of pneumonia.  Procalcitonin 0.20, hold off empiric antibiotics.  Monitor temp profile, afebrile, WBC.  Monitor.  - f/u labs ordered for AM            Diet: Combination Diet Moderate Consistent Carb (60 g CHO per Meal) Diet; Mechanical/Dental Soft Diet    DVT Prophylaxis: Heparin SQ  Pruett Catheter: Not present  Lines: None     Cardiac Monitoring: ACTIVE order. Indication: ICU  Code Status: No CPR- Do NOT Intubate      Clinically Significant Risk Factors                        # Overweight: Estimated body mass index is 29.62 kg/m  as calculated from the following:    Height as of this encounter: 1.524 m (5').    Weight as of this encounter: 68.8 kg (151 lb 10.8 oz).          Disposition Plan      Expected Discharge Date: 01/24/2023                  Ruben Turner MD  Hospitalist Service  [facility]  Securely message with [employer] (more info)  Text page via [employer] Paging/Directory   ______________________________________________________________________    Interval History   Patient denies pain, nausea or vomiting. He is afebrile.   Awaiting TCU placement.     Physical Exam   Vital Signs: Temp: 97.4  F (36.3  C) Temp src: Axillary BP: 113/68 Pulse: 82   Resp: 16 SpO2: 91 % O2 Device: None (Room air)    Weight: 151 lbs 10.82 oz    General Appearance: Alert, awake and no apparent distress  Respiratory: CTAB, no wheezing  Cardiovascular: regular rate and rhythm  GI: soft and non-tender  Skin: warm and dry      Medical Decision Making       60 MINUTES SPENT BY ME on the date of service doing chart review, history,  exam, documentation & further activities per the note.  MANAGEMENT DISCUSSED with the following over the past 24 hours: RN, care transition team   NOTE(S)/MEDICAL RECORDS REVIEWED over the past 24 hours: neurology note from prior dates during hospital course  Tests ORDERED & REVIEWED in the past 24 hours:  - BMP  - CBC  - Glucose  Tests personally interpreted in the past 24 hours:  - head CT, CTA, MRI of brain showing as above      Data         Imaging results reviewed over the past 24 hrs:   No results found for this or any previous visit (from the past 24 hour(s)).

## 2023-01-24 ENCOUNTER — APPOINTMENT (OUTPATIENT)
Dept: PHYSICAL THERAPY | Facility: CLINIC | Age: 83
DRG: 064 | End: 2023-01-24
Payer: COMMERCIAL

## 2023-01-24 ENCOUNTER — APPOINTMENT (OUTPATIENT)
Dept: OCCUPATIONAL THERAPY | Facility: CLINIC | Age: 83
DRG: 064 | End: 2023-01-24
Payer: COMMERCIAL

## 2023-01-24 LAB
ANION GAP SERPL CALCULATED.3IONS-SCNC: 11 MMOL/L (ref 7–15)
BUN SERPL-MCNC: 25.9 MG/DL (ref 8–23)
CALCIUM SERPL-MCNC: 9.5 MG/DL (ref 8.8–10.2)
CHLORIDE SERPL-SCNC: 94 MMOL/L (ref 98–107)
CREAT SERPL-MCNC: 1.43 MG/DL (ref 0.67–1.17)
DEPRECATED HCO3 PLAS-SCNC: 31 MMOL/L (ref 22–29)
ERYTHROCYTE [DISTWIDTH] IN BLOOD BY AUTOMATED COUNT: 14.5 % (ref 10–15)
GFR SERPL CREATININE-BSD FRML MDRD: 49 ML/MIN/1.73M2
GLUCOSE BLDC GLUCOMTR-MCNC: 105 MG/DL (ref 70–99)
GLUCOSE BLDC GLUCOMTR-MCNC: 158 MG/DL (ref 70–99)
GLUCOSE BLDC GLUCOMTR-MCNC: 158 MG/DL (ref 70–99)
GLUCOSE BLDC GLUCOMTR-MCNC: 228 MG/DL (ref 70–99)
GLUCOSE BLDC GLUCOMTR-MCNC: 293 MG/DL (ref 70–99)
GLUCOSE BLDC GLUCOMTR-MCNC: 49 MG/DL (ref 70–99)
GLUCOSE BLDC GLUCOMTR-MCNC: 61 MG/DL (ref 70–99)
GLUCOSE BLDC GLUCOMTR-MCNC: 69 MG/DL (ref 70–99)
GLUCOSE SERPL-MCNC: 138 MG/DL (ref 70–99)
HCT VFR BLD AUTO: 42.3 % (ref 40–53)
HGB BLD-MCNC: 13.5 G/DL (ref 13.3–17.7)
MCH RBC QN AUTO: 27.2 PG (ref 26.5–33)
MCHC RBC AUTO-ENTMCNC: 31.9 G/DL (ref 31.5–36.5)
MCV RBC AUTO: 85 FL (ref 78–100)
PLATELET # BLD AUTO: 244 10E3/UL (ref 150–450)
POTASSIUM SERPL-SCNC: 3.5 MMOL/L (ref 3.4–5.3)
RBC # BLD AUTO: 4.96 10E6/UL (ref 4.4–5.9)
SODIUM SERPL-SCNC: 136 MMOL/L (ref 136–145)
WBC # BLD AUTO: 9.9 10E3/UL (ref 4–11)

## 2023-01-24 PROCEDURE — 120N000001 HC R&B MED SURG/OB

## 2023-01-24 PROCEDURE — 250N000013 HC RX MED GY IP 250 OP 250 PS 637: Performed by: HOSPITALIST

## 2023-01-24 PROCEDURE — 97530 THERAPEUTIC ACTIVITIES: CPT | Mod: GO

## 2023-01-24 PROCEDURE — 250N000013 HC RX MED GY IP 250 OP 250 PS 637: Performed by: INTERNAL MEDICINE

## 2023-01-24 PROCEDURE — 97535 SELF CARE MNGMENT TRAINING: CPT | Mod: GO

## 2023-01-24 PROCEDURE — 85027 COMPLETE CBC AUTOMATED: CPT | Performed by: INTERNAL MEDICINE

## 2023-01-24 PROCEDURE — 97530 THERAPEUTIC ACTIVITIES: CPT | Mod: GP

## 2023-01-24 PROCEDURE — 258N000001 HC RX 258: Performed by: HOSPITALIST

## 2023-01-24 PROCEDURE — 80048 BASIC METABOLIC PNL TOTAL CA: CPT | Performed by: INTERNAL MEDICINE

## 2023-01-24 PROCEDURE — 99232 SBSQ HOSP IP/OBS MODERATE 35: CPT | Performed by: INTERNAL MEDICINE

## 2023-01-24 PROCEDURE — 97116 GAIT TRAINING THERAPY: CPT | Mod: GP

## 2023-01-24 PROCEDURE — 36415 COLL VENOUS BLD VENIPUNCTURE: CPT | Performed by: INTERNAL MEDICINE

## 2023-01-24 PROCEDURE — 250N000011 HC RX IP 250 OP 636: Performed by: HOSPITALIST

## 2023-01-24 RX ORDER — CARVEDILOL 12.5 MG/1
12.5 TABLET ORAL 2 TIMES DAILY WITH MEALS
Status: DISCONTINUED | OUTPATIENT
Start: 2023-01-24 | End: 2023-01-25

## 2023-01-24 RX ADMIN — ACETAMINOPHEN 650 MG: 325 TABLET, FILM COATED ORAL at 08:35

## 2023-01-24 RX ADMIN — AMLODIPINE BESYLATE 10 MG: 10 TABLET ORAL at 08:35

## 2023-01-24 RX ADMIN — HEPARIN SODIUM 5000 UNITS: 5000 INJECTION, SOLUTION INTRAVENOUS; SUBCUTANEOUS at 21:29

## 2023-01-24 RX ADMIN — ACETAMINOPHEN 650 MG: 325 TABLET, FILM COATED ORAL at 12:55

## 2023-01-24 RX ADMIN — CARVEDILOL 12.5 MG: 12.5 TABLET, FILM COATED ORAL at 18:12

## 2023-01-24 RX ADMIN — HEPARIN SODIUM 5000 UNITS: 5000 INJECTION, SOLUTION INTRAVENOUS; SUBCUTANEOUS at 08:35

## 2023-01-24 RX ADMIN — ACETAMINOPHEN 650 MG: 325 TABLET, FILM COATED ORAL at 04:12

## 2023-01-24 RX ADMIN — DEXTROSE MONOHYDRATE 25 ML: 25 INJECTION, SOLUTION INTRAVENOUS at 05:14

## 2023-01-24 RX ADMIN — IRBESARTAN 150 MG: 150 TABLET ORAL at 21:29

## 2023-01-24 RX ADMIN — TAMSULOSIN HYDROCHLORIDE 0.4 MG: 0.4 CAPSULE ORAL at 08:35

## 2023-01-24 ASSESSMENT — ACTIVITIES OF DAILY LIVING (ADL)
ADLS_ACUITY_SCORE: 28
ADLS_ACUITY_SCORE: 31
DEPENDENT_IADLS:: INDEPENDENT
ADLS_ACUITY_SCORE: 27
ADLS_ACUITY_SCORE: 28
FALL_HISTORY_WITHIN_LAST_SIX_MONTHS: YES
DOING_ERRANDS_INDEPENDENTLY_DIFFICULTY: NO
WALKING_OR_CLIMBING_STAIRS_DIFFICULTY: NO
DIFFICULTY_COMMUNICATING: NO
TOILETING_ISSUES: NO
CONCENTRATING,_REMEMBERING_OR_MAKING_DECISIONS_DIFFICULTY: NO
ADLS_ACUITY_SCORE: 31
WEAR_GLASSES_OR_BLIND: NO
DRESSING/BATHING_DIFFICULTY: NO
ADLS_ACUITY_SCORE: 31
ADLS_ACUITY_SCORE: 31
ADLS_ACUITY_SCORE: 24
ADLS_ACUITY_SCORE: 24
WALKING_OR_CLIMBING_STAIRS: OTHER (SEE COMMENTS)
CHANGE_IN_FUNCTIONAL_STATUS_SINCE_ONSET_OF_CURRENT_ILLNESS/INJURY: NO
ADLS_ACUITY_SCORE: 31
DIFFICULTY_EATING/SWALLOWING: NO
ADLS_ACUITY_SCORE: 27
NUMBER_OF_TIMES_PATIENT_HAS_FALLEN_WITHIN_LAST_SIX_MONTHS: 3
ADLS_ACUITY_SCORE: 28
HEARING_DIFFICULTY_OR_DEAF: NO
EQUIPMENT_CURRENTLY_USED_AT_HOME: GRAB BAR, TUB/SHOWER;GRAB BAR, TOILET

## 2023-01-24 NOTE — PROVIDER NOTIFICATION
"Notified Dr. Sorto: \"Pt BG 49. asymp ex slight headache. Drank 2 apple juice (30g) and BS increased to 61. requested ice cream and BG 69. Will continue to monitor until normal. Please advise if any other orders\"      25mL dextrose admin. BG increased to 158.   "

## 2023-01-24 NOTE — PLAN OF CARE
Pt here with IVH, ICH, acute/subacute infarcts.   A/Ox4. Forgetful. Speech slightly slurred at times. Generalized weakness. Vitals stable. SBP remained <160. Tele Afib CVR.  Mod carb diet, thins, pills whole. Voiding adequate amount when prompted to use bathroom. No BM overnight. Pt had slight headache relieved with tylenol. Up with A1 GBW.  Skin tear R hand mepilex intact.   See previous note regarding hypoglycemic episode.                             oral

## 2023-01-24 NOTE — CONSULTS
Care Management Initial Consult    General Information  Assessment completed with: Patient, Patient  Type of CM/SW Visit: Initial Assessment    Primary Care Provider verified and updated as needed: No   Readmission within the last 30 days:        Reason for Consult: discharge planning  Advance Care Planning: Advance Care Planning Reviewed: present on chart          Communication Assessment  Patient's communication style: spoken language (English or Bilingual)    Hearing Difficulty or Deaf: no   Wear Glasses or Blind: no    Cognitive  Cognitive/Neuro/Behavioral: WDL  Level of Consciousness: alert  Arousal Level: opens eyes spontaneously  Orientation: oriented x 4  Mood/Behavior: calm, cooperative  Best Language: 0 - No aphasia  Speech: clear    Living Environment:   People in home: spouse  Radha  Current living Arrangements: house      Able to return to prior arrangements: no       Family/Social Support:  Care provided by: self  Provides care for: no one  Marital Status:   Children, Wife  Radha       Description of Support System: Supportive, Involved    Support Assessment: Adequate family and caregiver support, Adequate social supports    Current Resources:   Patient receiving home care services: No     Community Resources:    Equipment currently used at home: none  Supplies currently used at home:      Employment/Financial:  Employment Status: retired        Financial Concerns: No concerns identified   Referral to Financial Worker: No       Lifestyle & Psychosocial Needs:  Social Determinants of Health     Tobacco Use: Medium Risk     Smoking Tobacco Use: Former     Smokeless Tobacco Use: Never     Passive Exposure: Not on file   Alcohol Use: Not on file   Financial Resource Strain: Not on file   Food Insecurity: Not on file   Transportation Needs: Not on file   Physical Activity: Not on file   Stress: Not on file   Social Connections: Not on file   Intimate Partner Violence: Not on file   Depression: Not on  file   Housing Stability: Not on file       Functional Status:  Prior to admission patient needed assistance:   Dependent ADLs:: Independent  Dependent IADLs:: Independent       Mental Health Status:  Mental Health Status: No Current Concerns       Chemical Dependency Status:  Chemical Dependency Status: No Current Concerns             Values/Beliefs:  Spiritual, Cultural Beliefs, Faith Practices, Values that affect care: no               Additional Information:  Per Care Management.social work consult for discharge planning patient admitted on 01/15 due to nontraumatic intraventricular intracerebral hemorrhage/ SW spoke with patient and left vm for wife. Per patients report he resides in a house with his wife. He has a stair lift, grab bars, and cane at home. At baseline patient is independent with all cares. SW discussed therapy recommendations for TCU and patient is in agreement. Patient indicated [facility] as [facility] has [facility] there before and was agreeable to [facility] referral as well. Patient was provided list and will review for more choices. TCU referrals sent via [facility]. SW will continue to follow.    Addendum: [facility] has accepted patient pending bed. For sure will have on Thursday 01/26 but may be able to accept 01/25.    JERE Cuello    [facility]

## 2023-01-24 NOTE — SIGNIFICANT EVENT
Significant Event Note    Time of event: 5:09 AM January 24, 2023    Description of event:  Paged for low blood sugar, FS 41.    Plan:  Correction with IV Dextrose and snacks as per protocol. Close monitoring continued. Bedtime Lantus held for now pending clinical reassessment today.    Discussed with: bedside nurse    Jb Sorto MD

## 2023-01-24 NOTE — PROGRESS NOTES
[facility]    Medicine Progress Note - Hospitalist Service    Date of Admission:  1/15/2023    Assessment & Plan    Tc Garcia, 82 year old male with PMH of stage III chronic kidney disease, anemia, paroxysmal atrial fibrillation on anticoagulation, pulmonary hypertension, essential hypertension, type 2 diabetes mellitus on insulin pump presents with increasing weakness, dizziness and lightheadedness with new intraventricular hemorrhage.     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 CT imaging in the ED showed large intraventricular hemorrhage of the right lateral ventricle.  He received Kcentra for anticoagulation reversal. Patient reports history of multiple falls including prior to this admission, no known hit to head, patient states general imbalance with turning. Anticoagulation reversed in ER.    *Needed nicardipine drip for blood pressure control, now on oral medications.   *Follow-up CT head on 1/16 showed stable hemorrhage.  *MRI on 118, showed stable parenchymal hemorrhage in the right parietal white matter with intraventricular extension into the right lateral ventricle with a small amount of hemorrhage in the left lateral ventricle.  Also noted for scattered area of acute/subacute appearing infarcts within both cerebral hemispheres in the right basal ganglia.  Also noted dural based enhancing lesion over the right cerebral convexity most likely representing meningioma.   * after acute symptoms resolved, needs decision regarding risk/benefits of  ongoing AC for A. fib given history of multiple falls and presentation with IVH.  - BP management as below  - started on chem dvt prophylaxis with subcutaneous heparin per neurology.  Recommend continuing to hold all other antiplatelet/anticoagulation  -PT/OT, rec TCU  -Stroke neurology considering possible enrollment in ASPIRE trial (ASA v AC for Afib patients with ICH), they are awaiting to hear back from primary cardiology team prior to enrollment.  Defer to neurology    Hypertension  Presented with hypertensive urgency as above.  He had initially in the car needed nicardipine drip in the ICU which has since been weaned off.  -Currently on amlodipine 10 mg daily  -PTA carvedilol was increased to 50mg BID this stay, reduced to 25mg BID on 1/23, then 12.5mg BID due  HR ranging in the 50s at times  -Doxazosin 1 mg daily added on 1/22-we will stop this and restart his Avapro 150mg at bedtime on 1/23  - per Neurology- inpatient BP goal <160;   -Long term outpatient goal BP is <130/80   -Blood pressure is currently well controlled        Type 2 diabetes mellitus, insulin-dependent, insulin pump  Hold off on insulin pump at this time given above issues.   - Hypoglycemic event overnight of 1/23-1/24, treated  - reduce Lantus to 12 units from 17 units qhs  - continue with aspart to 1 units per 20 g of carb with breakfast and lunch, change Aspart to 1 unit(s)/20g carb with dinner  -Continue with sliding scale insulin ac, stop bed time sliding scale insulin to avoid overnight hypoglycemia  -Monitor for hypoglycemia        Abnormal admission chest x-ray, patchy opacity lung bases  Indeterminate chest x-ray on admission, small patchy opacity lung bases, artifact versus pneumonia versus mass.  Denies cough, sputum, dyspnea.  Denies aspiration.  *Chest CT with nodules that will require outpatient follow-up per protocol, no acute finding suggestive of pneumonia.     Chronic kidney disease, stage III  Chart reviewed, creatinine  appears to range between 1.4-1.7 over the last 2 years  -Creatinine 1.21 on 1/21, but has ranged between 1.5-1.7 during this hospital stay which appears to be his baseline  -Monitor renal function, Cr 1.43 on 1/24    Possible urinary retention  -Had intermittently required straight catheterization per report  -On Flomax, will continue  - no issue with retention currently, patient voiding and PVR has been low. Patient need to be encouraged and needs to be up during voiding       Mild leukocytosis- resolved  WBC 16 on admission 10.7-14.6.  Afebrile.  No pyuria on UA.  Chest CT without acute findings of pneumonia.  Procalcitonin 0.20, hold off empiric antibiotics.  Monitor temp profile, afebrile, WBC.  Monitor.  - labs on 1/24 show normalization of wbc            Diet: Combination Diet Moderate Consistent Carb (60 g CHO per Meal) Diet; Mechanical/Dental Soft Diet  Room Service    DVT Prophylaxis: Heparin SQ  Pruett Catheter: Not present  Lines: None     Cardiac Monitoring: ACTIVE order. Indication: ICU  Code Status: No CPR- Do NOT Intubate      Clinically Significant Risk Factors                        # Overweight: Estimated body mass index is 27.99 kg/m  as calculated from the following:    Height as of this encounter: 1.524 m (5').    Weight as of this encounter: 65 kg (143 lb 4.8 oz).          Disposition Plan      Expected Discharge Date: 01/25/2023      Destination: other (comment) (TCU)            Ruben Turner MD  Hospitalist Service  [facility]  Securely message with [employer] (more info)  Text page via [employer] Paging/Directory   ______________________________________________________________________    Interval History   Hypoglycemic event overnight noted, chart reviewed. Patient reports that he was symptomatic from it. Currently denies n/v, abdominal pain, chest pain or shortness of breath.     Physical Exam   Vital Signs: Temp: 97.9  F (36.6  C) Temp src: Oral BP: 125/79 Pulse: 58    Resp: 18 SpO2: 100 % O2 Device: None (Room air)    Weight: 143 lbs 4.78 oz    General Appearance: Alert, awake and no apparent distress  Respiratory: CTAB, no wheezing  Cardiovascular: regular rate and rhythm  GI: soft and non-tender  Skin: warm and dry      Medical Decision Making       50 MINUTES SPENT BY ME on the date of service doing chart review, history, exam, documentation & further activities per the note.  MANAGEMENT DISCUSSED with the following over the past 24 hours: patient, RN, care transition team   NOTE(S)/MEDICAL RECORDS REVIEWED over the past 24 hours: nursing note, cross cover notes  Tests ORDERED & REVIEWED in the past 24 hours:  - BMP  - CBC  - Glucose      Data     I have personally reviewed the following data over the past 24 hrs:    9.9  \   13.5   / 244     136 94 (L) 25.9 (H) /  105 (H)   3.5 31 (H) 1.43 (H) \       Imaging results reviewed over the past 24 hrs:   No results found for this or any previous visit (from the past 24 hour(s)).

## 2023-01-24 NOTE — PLAN OF CARE
Pt here with IVH, ICH.  A/O x 4.  Forgetful.  gen weakness.  Vitals stable.  A fib rate controlled.  On RA.  tolearting  MCHO diet.  Voiding.  Last pvr 15ml.  Denies pain.  Up with A1 GBW.  Skin tear R hand.  Dressing cdi.  dispo pending placement.

## 2023-01-25 ENCOUNTER — APPOINTMENT (OUTPATIENT)
Dept: SPEECH THERAPY | Facility: CLINIC | Age: 83
DRG: 064 | End: 2023-01-25
Attending: INTERNAL MEDICINE
Payer: COMMERCIAL

## 2023-01-25 LAB
GLUCOSE BLDC GLUCOMTR-MCNC: 277 MG/DL (ref 70–99)
GLUCOSE BLDC GLUCOMTR-MCNC: 282 MG/DL (ref 70–99)
GLUCOSE BLDC GLUCOMTR-MCNC: 296 MG/DL (ref 70–99)
GLUCOSE BLDC GLUCOMTR-MCNC: 307 MG/DL (ref 70–99)
GLUCOSE BLDC GLUCOMTR-MCNC: 340 MG/DL (ref 70–99)
PLATELET # BLD AUTO: 270 10E3/UL (ref 150–450)

## 2023-01-25 PROCEDURE — 250N000013 HC RX MED GY IP 250 OP 250 PS 637: Performed by: INTERNAL MEDICINE

## 2023-01-25 PROCEDURE — 36415 COLL VENOUS BLD VENIPUNCTURE: CPT | Performed by: HOSPITALIST

## 2023-01-25 PROCEDURE — 92526 ORAL FUNCTION THERAPY: CPT | Mod: GN | Performed by: SPEECH-LANGUAGE PATHOLOGIST

## 2023-01-25 PROCEDURE — 92610 EVALUATE SWALLOWING FUNCTION: CPT | Mod: GN | Performed by: SPEECH-LANGUAGE PATHOLOGIST

## 2023-01-25 PROCEDURE — 85049 AUTOMATED PLATELET COUNT: CPT | Performed by: HOSPITALIST

## 2023-01-25 PROCEDURE — 250N000013 HC RX MED GY IP 250 OP 250 PS 637: Performed by: HOSPITALIST

## 2023-01-25 PROCEDURE — 120N000001 HC R&B MED SURG/OB

## 2023-01-25 PROCEDURE — 99232 SBSQ HOSP IP/OBS MODERATE 35: CPT | Performed by: INTERNAL MEDICINE

## 2023-01-25 PROCEDURE — 250N000011 HC RX IP 250 OP 636: Performed by: HOSPITALIST

## 2023-01-25 RX ORDER — IRBESARTAN 150 MG/1
300 TABLET ORAL AT BEDTIME
Status: DISCONTINUED | OUTPATIENT
Start: 2023-01-25 | End: 2023-01-27 | Stop reason: HOSPADM

## 2023-01-25 RX ORDER — CARVEDILOL 12.5 MG/1
12.5 TABLET ORAL 2 TIMES DAILY WITH MEALS
Status: DISCONTINUED | OUTPATIENT
Start: 2023-01-25 | End: 2023-01-27 | Stop reason: HOSPADM

## 2023-01-25 RX ORDER — CARVEDILOL 25 MG/1
25 TABLET ORAL 2 TIMES DAILY WITH MEALS
Status: DISCONTINUED | OUTPATIENT
Start: 2023-01-25 | End: 2023-01-25

## 2023-01-25 RX ADMIN — ACETAMINOPHEN 650 MG: 325 TABLET, FILM COATED ORAL at 12:32

## 2023-01-25 RX ADMIN — CARVEDILOL 12.5 MG: 12.5 TABLET, FILM COATED ORAL at 08:54

## 2023-01-25 RX ADMIN — ACETAMINOPHEN 650 MG: 325 TABLET, FILM COATED ORAL at 17:30

## 2023-01-25 RX ADMIN — AMLODIPINE BESYLATE 10 MG: 10 TABLET ORAL at 08:06

## 2023-01-25 RX ADMIN — ACETAMINOPHEN 650 MG: 325 TABLET, FILM COATED ORAL at 08:06

## 2023-01-25 RX ADMIN — ACETAMINOPHEN 650 MG: 325 TABLET, FILM COATED ORAL at 01:25

## 2023-01-25 RX ADMIN — IRBESARTAN 300 MG: 150 TABLET ORAL at 22:16

## 2023-01-25 RX ADMIN — HEPARIN SODIUM 5000 UNITS: 5000 INJECTION, SOLUTION INTRAVENOUS; SUBCUTANEOUS at 22:18

## 2023-01-25 RX ADMIN — TAMSULOSIN HYDROCHLORIDE 0.4 MG: 0.4 CAPSULE ORAL at 08:06

## 2023-01-25 RX ADMIN — CARVEDILOL 12.5 MG: 12.5 TABLET, FILM COATED ORAL at 17:30

## 2023-01-25 RX ADMIN — HEPARIN SODIUM 5000 UNITS: 5000 INJECTION, SOLUTION INTRAVENOUS; SUBCUTANEOUS at 08:07

## 2023-01-25 ASSESSMENT — ACTIVITIES OF DAILY LIVING (ADL)
ADLS_ACUITY_SCORE: 24
ADLS_ACUITY_SCORE: 25
ADLS_ACUITY_SCORE: 24
ADLS_ACUITY_SCORE: 24
ADLS_ACUITY_SCORE: 25

## 2023-01-25 NOTE — PLAN OF CARE
Pt here with spontaneous IVH and subacute infarcts . A&Ox4, forgetful. Neuros spontaneous IVH and subacute infarcts. VSS, SBP <160. Tele a-fib with CVR. Mod Carb Mech Soft diet, thin liquids. Takes pills whole. Up with assist x1, GB and walker. Voiding moderate amounts when promoted. Gave Tylenol x1 for HA pain. Pt scoring green on the Aggression Stop Light Tool. Plan awaiting to discharge to TCU. Discharge pending.

## 2023-01-25 NOTE — PLAN OF CARE
Reason for Admission: spontaneous IVH, subacute infarcts     Cognitive/Mentation: A&O x4, forgetful  Neuros/CMS: Intact ex general weakness  VS: Stable on RA   Tele: A-fib CVR  GI: BS active, + flatus. Continent. LBM 1/24.    : Voiding. Continent.  Pulmonary: LS clear.  Pain: Denies     Drains/Lines: PIV SL  Skin: Skin tear R hand  Activity: Assist x1 GB/W  Diet: Minced and moist with thin liquids. Takes pills whole.      Therapies recs: [facility]  Discharge: Today or tomorrow     Aggression Stoplight Tool: Green     End of shift summary: No acute changes overnight.

## 2023-01-25 NOTE — PROGRESS NOTES
01/25/23 8702   Appointment Info   Signing Clinician's Name / Credentials (SLP) Jessika Wylie MS CCC SLP   General Information   Onset of Illness/Injury or Date of Surgery 01/15/23   Referring Physician Dr. Turner   Patient/Family Therapy Goal Statement (SLP) Patient stated he can't chew well since his dentures were lost.   Pertinent History of Current Problem Per MD note: Tc Garcia, 82 year old male with PMH of stage III chronic kidney disease, anemia, paroxysmal atrial fibrillation on anticoagulation, pulmonary hypertension, essential hypertension, type 2 diabetes mellitus on insulin pump presents with increasing weakness, dizziness and lightheadedness with new intraventricular hemorrhage. 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   General Observations Pleasant and cooperative.   Type of Evaluation   Type of Evaluation Swallow Evaluation   Oral Motor   Oral Musculature generally intact   Structural Abnormalities none present   Mucosal Quality adequate   Dentition (Oral Motor)   Dentition (Oral Motor) edentulous;other (see comments)  (Has four lower front teeth.)   Facial Symmetry (Oral Motor)   Facial Symmetry (Oral Motor) WNL   Lip Function (Oral Motor)   Lip Range of Motion (Oral Motor) WNL   Tongue Function (Oral Motor)   Tongue Strength (Oral Motor) WFL   Tongue Coordination/Speed (Oral Motor) slows down progressively   Tongue ROM (Oral Motor) elevation is impaired   Elevation, Tongue ROM Impairment (Oral Motor) bilateral;minimal impairment   Jaw Function (Oral Motor)   Jaw Function (Oral Motor) WNL    Cough/Swallow/Gag Reflex (Oral Motor)   Soft Palate/Velum (Oral Motor) WNL   Volitional Throat Clear/Cough (Oral Motor) impaired;reduced strength   Vocal Quality/Secretion Management (Oral Motor)   Vocal Quality (Oral Motor) WFL   General Swallowing Observations   Past History of Dysphagia Patient passed swallow screen 5/20 during that admission. Seen 11/10/19 patient was on a DDL 2 with thin liquids and at time of discharge was advanced to DDL 3 with thin liquids.   Respiratory Support (General Swallowing Observations) none   Current Diet/Method of Nutritional Intake (General Swallowing Observations, NIS) easy to chew (level 7);thin liquids (level 0)   Swallowing Evaluation Clinical swallow evaluation   Clinical Swallow Evaluation   Feeding Assistance set up only required   Clinical Swallow Evaluation Textures Trialed thin liquids;pureed;soft & bite-sized   Clinical Swallow Eval: Thin Liquid Texture Trial   Mode of Presentation, Thin Liquids cup;straw;self-fed   Volume of Liquid or Food Presented 3 oz of water   Oral Phase of Swallow premature pharyngeal entry   Pharyngeal Phase of Swallow intact   Diagnostic Statement No immediate or delayed Sx of aspiration.   Clinical Swallow Evaluation: Puree Solid Texture Trial   Mode of Presentation, Puree spoon;self-fed   Volume of Puree Presented 2 bites of apple sauce   Oral Phase, Puree WFL   Pharyngeal Phase, Puree intact   Clinical Swallow Eval: Soft & Bite Sized   Mode of Presentation spoon;self-fed   Volume Presented 2 oz of diced pears   Oral Phase impaired mastication;residue in oral cavity   Oral Residue mid posterior tongue   Pharyngeal Phase impaired;repeated swallows   Diagnostic Statement Prolonged mastication due to no dentition. Mildly decreased bolus control during AP movement with mild oral residue on mid tongue.   Esophageal Phase of Swallow   Patient reports or presents with symptoms of esophageal dysphagia Yes   Esophageal comments History of GERDs.    Swallowing Recommendations   Diet Consistency Recommendations minced & moist (level 5);thin liquids (level 0)   Supervision Level for Intake distant supervision needed   Mode of Delivery Recommendations bolus size, small;food moistened;slow rate of intake   Postural Recommendations none   Swallowing Maneuver Recommendations alternate food and liquid intake   Monitoring/Assistance Required (Eating/Swallowing) check mouth frequently for oral residue/pocketing;stop eating activities when fatigue is present;monitor for cough or change in vocal quality with intake   Recommended Feeding/Eating Techniques (Swallow Eval) maintain upright sitting position for eating;maintain upright posture during/after eating for 30 minutes;minimize distractions during oral intake;set-up and prepare tray   Medication Administration Recommendations, Swallowing (SLP) As tolerated.   Instrumental Assessment Recommendations instrumental evaluation not recommended at this time   General Therapy Interventions   Planned Therapy Interventions Dysphagia Treatment   Dysphagia treatment Modified diet education;Instruction of safe swallow strategies   Clinical Impression   Criteria for Skilled Therapeutic Interventions Met (SLP Eval) Yes, treatment indicated   SLP Diagnosis Mild oral and pharyngeal dysphagia at bedside   Risks & Benefits of therapy have been explained evaluation/treatment results reviewed;care plan/treatment goals reviewed;risks/benefits reviewed;current/potential barriers reviewed;participants voiced agreement with care plan;participants included;patient   Clinical Impression Comments Patient presents with mild oral and pharyngeal dysphagia at bedside. Patient's dentures were lost during this admission and he reports difficulty masticating some of the foods on the current diet like meats, vegetables. His oral motor function was grossly intact except for lingual elevation. He was able to tolerate thin liquids via the cup and straw  without immediate or delayed Sx of aspiration. He was able to masticate diced pears with mildly decreased bolus coordination during AP movement with mild oral residue on his mid tongue cleared with a liquid rinse. Patient willing to try a minced/moist diet to increase food options he can chew.     Recommend: 1. Diet changed to IDDSI level 5 minced/moist with thin liquid. 2. Upright, small bites/sips, alternate liquids/solids. 3. SLP will f/u for diet tolerance and preference.     SLP Total Evaluation Time   Eval: oral/pharyngeal swallow function, clinical swallow Minutes (76091) 15   SLP Goals   Therapy Frequency (SLP Eval) 3 times/wk   SLP Predicted Duration/Target Date for Goal Attainment 02/01/23   SLP Goals Swallow   SLP: Safely tolerate diet without signs/symptoms of aspiration Minced & moist diet;Thin liquids;With use of swallow precautions;With assistance/supervision   Interventions   Interventions Quick Adds Swallowing Dysfunction   Swallowing Dysfunction &/or Oral Function for Feeding   Treatment of Swallowing Dysfunction &/or Oral Function for Feeding Minutes (57404) 9   Symptoms Noted During/After Treatment None   Treatment Detail/Skilled Intervention Patient was seen for swallow treatment to provide education on the Sx of aspiration and rationale for a modified diet and and swallow stategies. Patient verbalized understanding and is open to a diet change due to missing dentures.   SLP Discharge Planning   SLP Plan Meal f/u minced moist and thins.   SLP Discharge Recommendation Transitional Care Facility   SLP Rationale for DC Rec Swallow function is below his baseline.   SLP Brief overview of current status  Patient without dentition diet changed to IDDSI level 5 and thin liquids. Up in chair for all meals and 30 minutes after due to esophageal dysfunction. He may benefit from a cognitive linguistic evaluation at the next level of care.   Total Session Time   Total Session Time (sum of timed and untimed  services) 24

## 2023-01-25 NOTE — PROVIDER NOTIFICATION
"Paged Dr. Sorto \"FYI. Pt's 0200 blood sugar check was 293. Please advise if you would like any correction. Thanks\"  "

## 2023-01-25 NOTE — PROGRESS NOTES
[facility]    Medicine Progress Note - Hospitalist Service    Date of Admission:  1/15/2023    Assessment & Plan    Tc Garcia, 82 year old male with PMH of stage III chronic kidney disease, anemia, paroxysmal atrial fibrillation on anticoagulation, pulmonary hypertension, essential hypertension, type 2 diabetes mellitus on insulin pump presents with increasing weakness, dizziness and lightheadedness with new intraventricular hemorrhage.     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 CT imaging in the ED showed large intraventricular hemorrhage of the right lateral ventricle.  He received Kcentra for anticoagulation reversal. Patient reports history of multiple falls including prior to this admission, no known hit to head, patient states general imbalance with turning. Anticoagulation reversed in ER.    *Needed nicardipine drip for blood pressure control, now on oral medications.   *Follow-up CT head on 1/16 showed stable hemorrhage.  *MRI on 118, showed stable parenchymal hemorrhage in the right parietal white matter with intraventricular extension into the right lateral ventricle with a small amount of hemorrhage in the left lateral ventricle.  Also noted for scattered area of acute/subacute appearing infarcts within both cerebral hemispheres in the right basal ganglia.  Also noted dural based enhancing lesion over the right cerebral convexity most likely representing meningioma.   * after acute symptoms resolved, needs decision regarding risk/benefits of  ongoing AC for A. fib given history of multiple falls and presentation with IVH.  - BP management as below  - started on chem dvt prophylaxis with subcutaneous heparin per neurology.  Recommend continuing to hold all other antiplatelet/anticoagulation  - PT/OT, rec TCU  - Stroke neurology considering possible enrollment in ASPIRE trial (ASA v AC for Afib patients with ICH), they are awaiting to hear back from primary cardiology team prior to enrollment.  Defer to neurology-- have already signed off.     Hypertension  Presented with hypertensive urgency as above. PTA on Coreg 6.25 mg twice daily and Avapro 450 mg nightly.  He had initially in the car needed nicardipine drip in the ICU which has since been weaned off.      -Currently on amlodipine 10 mg daily  -PTA carvedilol was increased to 50mg BID this stay, reduced to 25mg BID on 1/23 due to heart rate ranging in the 50s  -Doxazosin 1 mg daily added on 1/22, stopped on 1/23 and added back Avapro 150mg at bedtime on 1/23-->increase to 300mg at bedtime on 1/25  - per Neurology- inpatient BP goal <160;   - Long term outpatient goal BP is <130/80         Type 2 diabetes mellitus, insulin-dependent, insulin pump  Hold off on insulin pump at this time given above issues.   - Hypoglycemic event overnight of 1/23-1/24, lowest 49.    - Lantus dose was reduced on 1/24 and hs sliding scale insulin discontinued on 1/24, now BG running in the 200-300.    - patient currently with poor oral intake  - increase Lantus from 12 units to 15 units at bedtime   - change Asprat to 1 unit(s)/15gm carb TID w/meals  - continue with sliding scale insulin, but stop bedtime stop bed time sliding scale insulin to avoid overnight hypoglycemia  - Monitor for hypoglycemia        Abnormal admission chest x-ray, patchy opacity lung bases  Indeterminate chest x-ray on admission, small patchy opacity lung bases, artifact versus pneumonia versus mass.  Denies cough, sputum, dyspnea.  Denies  aspiration.  *Chest CT with nodules that will require outpatient follow-up per protocol, no acute finding suggestive of pneumonia.     Chronic kidney disease, stage III  Chart reviewed, creatinine appears to range between 1.4-1.7 over the last 2 years  -Creatinine 1.21 on 1/21, but has ranged between 1.5-1.7 during this hospital stay which appears to be his baseline  -Monitor renal function, Cr 1.43 on 1/24    Possible urinary retention  -Had intermittently required straight catheterization per report  -On Flomax, will continue  -no issue with retention currently, patient voiding and PVR has been low. Patient need to be encouraged and needs to be up during voiding    Mild leukocytosis- resolved  WBC 16 on admission 10.7-14.6.  Afebrile.  No pyuria on UA.  Chest CT without acute findings of pneumonia.  Procalcitonin 0.20, hold off empiric antibiotics.  Monitor temp profile, afebrile, WBC.  Monitor.  - labs on 1/24 show normalization of wbc            Diet: Combination Diet Moderate Consistent Carb (60 g CHO per Meal) Diet; Mechanical/Dental Soft Diet  Room Service    DVT Prophylaxis: Heparin SQ  Pruett Catheter: Not present  Lines: None     Cardiac Monitoring: ACTIVE order. Indication: ICU  Code Status: No CPR- Do NOT Intubate      Clinically Significant Risk Factors                        # Overweight: Estimated body mass index is 27.99 kg/m  as calculated from the following:    Height as of this encounter: 1.524 m (5').    Weight as of this encounter: 65 kg (143 lb 4.8 oz).          Disposition Plan      Expected Discharge Date: 01/26/2023    Discharge Delays: Insurance Authorization needed  *Medically Ready for Discharge  Destination: other (comment) (TCU)            Ruben Turner MD  Hospitalist Service  [facility]  Securely message with [employer] (more info)  Text page via [employer] Paging/Directory   ______________________________________________________________________    Interval  History   Patient states he does not have much appetite and not eating much.   BG currently running on higher side.   Denies cp,sob, n/v or abdominal pain    Physical Exam   Vital Signs: Temp: 98.1  F (36.7  C) Temp src: Oral BP: (!) 154/88 Pulse: 65   Resp: 16 SpO2: 97 % O2 Device: None (Room air)    Weight: 143 lbs 4.78 oz    General Appearance: Alert, awake and no apparent distress  Respiratory: CTAB, no wheezing  Cardiovascular: regular rate and rhythm  GI: soft and non-tender  Skin: warm and dry      Medical Decision Making       45 MINUTES SPENT BY ME on the date of service doing chart review, history, exam, documentation & further activities per the note.  MANAGEMENT DISCUSSED with the following over the past 24 hours: patient, RN, care transition team   NOTE(S)/MEDICAL RECORDS REVIEWED over the past 24 hours: nursing note  Tests ORDERED & REVIEWED in the past 24 hours:  - Glucose, platelet       Data     I have personally reviewed the following data over the past 24 hrs:    N/A  \   N/A   / 270     N/A N/A N/A /  307 (H)   N/A N/A N/A \       Imaging results reviewed over the past 24 hrs:   No results found for this or any previous visit (from the past 24 hour(s)).

## 2023-01-26 ENCOUNTER — APPOINTMENT (OUTPATIENT)
Dept: SPEECH THERAPY | Facility: CLINIC | Age: 83
DRG: 064 | End: 2023-01-26
Payer: COMMERCIAL

## 2023-01-26 ENCOUNTER — APPOINTMENT (OUTPATIENT)
Dept: PHYSICAL THERAPY | Facility: CLINIC | Age: 83
DRG: 064 | End: 2023-01-26
Payer: COMMERCIAL

## 2023-01-26 ENCOUNTER — APPOINTMENT (OUTPATIENT)
Dept: CT IMAGING | Facility: CLINIC | Age: 83
DRG: 064 | End: 2023-01-26
Attending: INTERNAL MEDICINE
Payer: COMMERCIAL

## 2023-01-26 LAB
GLUCOSE BLDC GLUCOMTR-MCNC: 145 MG/DL (ref 70–99)
GLUCOSE BLDC GLUCOMTR-MCNC: 160 MG/DL (ref 70–99)
GLUCOSE BLDC GLUCOMTR-MCNC: 167 MG/DL (ref 70–99)
GLUCOSE BLDC GLUCOMTR-MCNC: 205 MG/DL (ref 70–99)
GLUCOSE BLDC GLUCOMTR-MCNC: 301 MG/DL (ref 70–99)

## 2023-01-26 PROCEDURE — 70450 CT HEAD/BRAIN W/O DYE: CPT

## 2023-01-26 PROCEDURE — 250N000011 HC RX IP 250 OP 636: Performed by: HOSPITALIST

## 2023-01-26 PROCEDURE — 250N000013 HC RX MED GY IP 250 OP 250 PS 637: Performed by: HOSPITALIST

## 2023-01-26 PROCEDURE — 92526 ORAL FUNCTION THERAPY: CPT | Mod: GN | Performed by: SPEECH-LANGUAGE PATHOLOGIST

## 2023-01-26 PROCEDURE — 97116 GAIT TRAINING THERAPY: CPT | Mod: GP | Performed by: PHYSICAL THERAPIST

## 2023-01-26 PROCEDURE — 99233 SBSQ HOSP IP/OBS HIGH 50: CPT | Performed by: INTERNAL MEDICINE

## 2023-01-26 PROCEDURE — 120N000001 HC R&B MED SURG/OB

## 2023-01-26 PROCEDURE — 250N000013 HC RX MED GY IP 250 OP 250 PS 637: Performed by: INTERNAL MEDICINE

## 2023-01-26 PROCEDURE — 97112 NEUROMUSCULAR REEDUCATION: CPT | Mod: GP | Performed by: PHYSICAL THERAPIST

## 2023-01-26 RX ORDER — CARVEDILOL 12.5 MG/1
12.5 TABLET ORAL 2 TIMES DAILY WITH MEALS
DISCHARGE
Start: 2023-01-26 | End: 2023-03-07

## 2023-01-26 RX ORDER — IRBESARTAN 300 MG/1
300 TABLET ORAL AT BEDTIME
Status: ON HOLD | DISCHARGE
Start: 2023-01-26 | End: 2023-01-31

## 2023-01-26 RX ORDER — TAMSULOSIN HYDROCHLORIDE 0.4 MG/1
0.4 CAPSULE ORAL DAILY
DISCHARGE
Start: 2023-01-27 | End: 2023-04-20

## 2023-01-26 RX ORDER — AMLODIPINE BESYLATE 10 MG/1
10 TABLET ORAL DAILY
Status: ON HOLD | DISCHARGE
Start: 2023-01-27 | End: 2023-01-31

## 2023-01-26 RX ORDER — INSULIN LISPRO 100 [IU]/ML
1-10 INJECTION, SOLUTION INTRAVENOUS; SUBCUTANEOUS
Status: ON HOLD | DISCHARGE
Start: 2023-01-26 | End: 2023-01-31

## 2023-01-26 RX ADMIN — AMLODIPINE BESYLATE 10 MG: 10 TABLET ORAL at 08:29

## 2023-01-26 RX ADMIN — ACETAMINOPHEN 650 MG: 325 TABLET, FILM COATED ORAL at 13:10

## 2023-01-26 RX ADMIN — CARVEDILOL 12.5 MG: 12.5 TABLET, FILM COATED ORAL at 08:29

## 2023-01-26 RX ADMIN — CARVEDILOL 12.5 MG: 12.5 TABLET, FILM COATED ORAL at 18:24

## 2023-01-26 RX ADMIN — TAMSULOSIN HYDROCHLORIDE 0.4 MG: 0.4 CAPSULE ORAL at 08:29

## 2023-01-26 RX ADMIN — HEPARIN SODIUM 5000 UNITS: 5000 INJECTION, SOLUTION INTRAVENOUS; SUBCUTANEOUS at 08:29

## 2023-01-26 RX ADMIN — HEPARIN SODIUM 5000 UNITS: 5000 INJECTION, SOLUTION INTRAVENOUS; SUBCUTANEOUS at 20:30

## 2023-01-26 RX ADMIN — IRBESARTAN 300 MG: 150 TABLET ORAL at 20:29

## 2023-01-26 ASSESSMENT — ACTIVITIES OF DAILY LIVING (ADL)
ADLS_ACUITY_SCORE: 24
ADLS_ACUITY_SCORE: 27
ADLS_ACUITY_SCORE: 27
ADLS_ACUITY_SCORE: 24
ADLS_ACUITY_SCORE: 27
ADLS_ACUITY_SCORE: 27
ADLS_ACUITY_SCORE: 24
ADLS_ACUITY_SCORE: 27
ADLS_ACUITY_SCORE: 24
ADLS_ACUITY_SCORE: 27

## 2023-01-26 NOTE — PLAN OF CARE
Occupational Therapy Discharge Summary    Reason for therapy discharge:    Discharged to transitional care facility.    Progress towards therapy goal(s). See goals on Care Plan in [facility] electronic health record for goal details.  Goals partially met.  Barriers to achieving goals:   discharge from facility.    Therapy recommendation(s):    Continued therapy is recommended.  Rationale/Recommendations:  cont with functional mobility and strengtheing for ADL.

## 2023-01-26 NOTE — DISCHARGE SUMMARY
[facility]    Discharge Summary  Hospitalist    Date of Admission:  1/15/2023  Date of Discharge:  1/26/2023  Discharging Provider: Radha Fu MD    Discharge Diagnoses   Nontraumatic intraventricular intracerebral hemorrhage    History of Present Illness   Review admission history and physical.    Hospital Course   Tc Garcia was admitted on 1/15/2023.  The following problems were addressed during his hospitalization:    Principal Problem:    Nontraumatic intraventricular intracerebral hemorrhage (H)  Active Problems:    Essential hypertension    Anticoagulated    Right-sided nontraumatic intraventricular intracerebral hemorrhage (H)    Tc Garcia, 82 year old male with PMH of stage III chronic kidney disease, anemia, paroxysmal atrial fibrillation on anticoagulation, pulmonary hypertension, essential hypertension, type 2 diabetes mellitus on insulin pump presents with increasing weakness, dizziness and lightheadedness with new intraventricular hemorrhage.     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 CT imaging in the ED showed large intraventricular hemorrhage of the right lateral ventricle.  He received Kcentra for anticoagulation reversal. Patient reports history of multiple falls including prior to this admission, no known hit to head, patient states general imbalance with turning. Anticoagulation reversed in ER.  Needed nicardipine drip for blood pressure control, now on oral medications. Follow-up CT head on 1/16 showed stable hemorrhage.  *MRI  on 118, showed stable parenchymal hemorrhage in the right parietal white matter with intraventricular extension into the right lateral ventricle with a small amount of hemorrhage in the left lateral ventricle.  Also noted for scattered area of acute/subacute appearing infarcts within both cerebral hemispheres in the right basal ganglia.  Also noted dural based enhancing lesion over the right cerebral convexity most likely representing meningioma.   * after acute symptoms resolved, needs decision regarding risk/benefits of ongoing AC for HUGO duncan given history of multiple falls and presentation with IVH.  See blood pressure management below, he did receive subcu heparin for DVT prophylaxis to prior to discharge, patient was seen by PT/OT, DC has been recommended, seen by neurology this admission and plan is to follow-up outpatient with stroke neurology.       Hypertension  Presented with hypertensive urgency as above. PTA on Coreg 6.25 mg twice daily and Avapro 450 mg nightly.  He had initially in the car needed nicardipine drip in the ICU which has since been weaned off.    Patient is currently reviewed receiving amlodipine 10 mg, his carvedilol has been increased to 25 mg twice daily due to bradycardia later on further reduced to 12.5 twice daily, doxazosin 1 mg daily added on 1/22, later it was stopped and Avapro was added, currently patient is on 300 mg prior to bedtime, increased that to 450 mg as per her PTA dose on discharge due to elevated blood pressures.  His blood pressure goal is less than 160 systolic, long-term blood pressure goal is 130/80 or less        Type 2 diabetes mellitus, insulin-dependent, insulin pump  Hold off on insulin pump at this time given above issues.  Patient had some hypoglycemic episodes here, Lantus dose has been reduced further, he was on insulin pump prior to this, on discharge to TCU will be sending with Premeal and sliding scale along with Lantus, monitor for hypoglycemia and  patient could go back on insulin upon discharge.          Abnormal admission chest x-ray, patchy opacity lung bases  Indeterminate chest x-ray on admission, small patchy opacity lung bases, artifact versus pneumonia versus mass.  Denies cough, sputum, dyspnea.  Denies aspiration.  *Chest CT with nodules that will require outpatient follow-up per protocol, no acute finding suggestive of pneumonia.     Chronic kidney disease, stage III  Chart reviewed, creatinine appears to range between 1.4-1.7 over the last 2 years  -Creatinine 1.21 on 1/21, but has ranged between 1.5-1.7 during this hospital stay which appears to be his baseline, his creatinine close to discharge is 1.43.  Appears to be in range.       Possible urinary retention  -Had intermittently required straight catheterization per report, was started on Flomax here, patient later denied having any retention, he is voiding and PVR as below.  Continue Flomax on discharge.    Mild leukocytosis- resolved  WBC 16 on admission 10.7-14.6.  Afebrile.  No pyuria on UA.  Chest CT without acute findings of pneumonia.  Procalcitonin 0.20, patient had no antibiotics during the stay, white count normalized.      Radha Fu MD    Significant Results and Procedures       Pending Results     Unresulted Labs Ordered in the Past 30 Days of this Admission     No orders found from 12/16/2022 to 1/16/2023.          Code Status   DNR/DNI       Primary Care Physician   Jessika Cordoba    Physical Exam   Temp: 98.4  F (36.9  C) Temp src: Axillary BP: (!) 154/81 Pulse: 76   Resp: 16 SpO2: 100 % O2 Device: None (Room air)    Vitals:    01/17/23 0500 01/23/23 0600 01/26/23 0200   Weight: 68.8 kg (151 lb 10.8 oz) 65 kg (143 lb 4.8 oz) 63.8 kg (140 lb 11.2 oz)     Vital Signs with Ranges  Temp:  [98.4  F (36.9  C)-99.3  F (37.4  C)] 98.4  F (36.9  C)  Pulse:  [66-76] 76  Resp:  [16-18] 16  BP: (123-154)/(76-81) 154/81  SpO2:  [96 %-100 %] 100 %  I/O last 3 completed  shifts:  In: 220 [P.O.:220]  Out: 300 [Urine:300]    The patient was examined on the day of discharge.    Discharge Disposition   Discharged to TCU  Condition at discharge: Stable    Consultations This Hospital Stay   NEUROSURGERY IP CONSULT  NEUROLOGY CRITICAL CARE ADULT IP CONSULT  PHARMACY IP CONSULT  PHYSICAL THERAPY ADULT IP CONSULT  OCCUPATIONAL THERAPY ADULT IP CONSULT  VASCULAR ACCESS ADULT IP CONSULT  CARE MANAGEMENT / SOCIAL WORK IP CONSULT  SPEECH LANGUAGE PATH ADULT IP CONSULT  PHYSICAL THERAPY ADULT IP CONSULT  OCCUPATIONAL THERAPY ADULT IP CONSULT    Time Spent on this Encounter   I, Radha Fu MD, personally saw the patient today and spent greater than 30 minutes discharging this patient.    Discharge Orders      Neurosurgery Referral      General info for SNF    Length of Stay Estimate: Short Term Care: Estimated # of Days <30  Condition at Discharge: Improving  Level of care:skilled   Rehabilitation Potential: Excellent  Admission H&P remains valid and up-to-date: Yes  Recent Chemotherapy: N/A  Use Nursing Home Standing Orders: Yes     Mantoux instructions    Give two-step Mantoux (PPD) Per Facility Policy Yes     Follow Up and recommended labs and tests    Follow up with Nursing home physician.  No follow up labs or test are needed.follow up with neurology as recommended.     Reason for your hospital stay    Cerebrovascular accident     Activity - Up with nursing assistance     Physical Therapy Adult Consult    Evaluate and treat as clinically indicated.    Reason:  cva     Occupational Therapy Adult Consult    Evaluate and treat as clinically indicated.    Reason:  CVA     Fall precautions     Diet    Follow this diet upon discharge: Orders Placed This Encounter      Room Service      Combination Diet Moderate Consistent Carb (60 g CHO per Meal) Diet; Minced and Moist Diet (level 5); Thin Liquids (level 0)     Stroke Hospital Follow Up    [facility] will call you to coordinate care as  prescribed by your provider. If you don t hear from a representative within 2 business days, please call [phone].    [facility] will call you to coordinate care as prescribed by your provider. If you don t hear from a representative within 2 business days, please call [phone].       Discharge Medications   Current Discharge Medication List      START taking these medications    Details   amLODIPine (NORVASC) 10 MG tablet Take 1 tablet (10 mg) by mouth daily    Associated Diagnoses: Nontraumatic intraventricular intracerebral hemorrhage, unspecified laterality (H)      insulin aspart (NOVOLOG PEN) 100 UNIT/ML pen Inject 1-7 Units Subcutaneous 3 times daily (before meals)  Qty: 15 mL    Associated Diagnoses: Nontraumatic intraventricular intracerebral hemorrhage, unspecified laterality (H)      insulin glargine (LANTUS PEN) 100 UNIT/ML pen Inject 15 Units Subcutaneous At Bedtime  Qty: 15 mL    Comments: If Lantus is not covered by insurance, may substitute Basaglar or Semglee or other insulin glargine product per insurance preference at same dose and frequency.    Associated Diagnoses: Nontraumatic intraventricular intracerebral hemorrhage, unspecified laterality (H)      insulin lispro (HUMALOG) 100 UNIT/ML Cartridge Inject 1-10 Units Subcutaneous 3 times daily (before meals)    Comments: 1-20 units on sliding scale ,120-150-1 unit,[id number],201-250 4Units,[id number],[id number],351-400 -12 units ,call md if>400  Associated Diagnoses: Type 2 diabetes mellitus with other neurologic complication, with long-term current use of insulin (H)      tamsulosin (FLOMAX) 0.4 MG capsule Take 1 capsule (0.4 mg) by mouth daily    Associated Diagnoses: Nontraumatic intraventricular intracerebral hemorrhage, unspecified laterality (H)         CONTINUE these medications which have CHANGED    Details   carvedilol (COREG) 12.5 MG tablet Take 1 tablet (12.5 mg) by mouth 2 times daily (with meals)     Associated Diagnoses: Nontraumatic intraventricular intracerebral hemorrhage, unspecified laterality (H)      !! irbesartan (AVAPRO) 300 MG tablet Take 1 tablet (300 mg) by mouth At Bedtime    Associated Diagnoses: Nontraumatic intraventricular intracerebral hemorrhage, unspecified laterality (H)       !! - Potential duplicate medications found. Please discuss with provider.      CONTINUE these medications which have NOT CHANGED    Details   furosemide (LASIX) 40 MG tablet Take 1 1/2 tablets(60mg) by mouth every day  Qty: 135 tablet, Refills: 3    Associated Diagnoses: Acute on chronic heart failure with preserved ejection fraction (HFpEF) (H)      !! irbesartan (AVAPRO) 150 MG tablet Take 1 tablet (150 mg) by mouth At Bedtime  Qty: 30 tablet, Refills: 11    Associated Diagnoses: Chronic heart failure with preserved ejection fraction (HFpEF) (H); Acute on chronic heart failure with preserved ejection fraction (HFpEF) (H); Essential hypertension; Chronic diastolic heart failure (H)      nystatin (MYCOSTATIN) 920269 UNIT/GM external cream Apply topically 2 times daily as needed       glucagon 1 MG kit 1 mg as needed for low blood sugar       !! - Potential duplicate medications found. Please discuss with provider.      STOP taking these medications       apixaban ANTICOAGULANT (ELIQUIS) 5 MG tablet Comments:   Reason for Stopping:         cyclobenzaprine (FLEXERIL) 10 MG tablet Comments:   Reason for Stopping:         HUMALOG 100 UNIT/ML injection Comments:   Reason for Stopping:             Allergies   Allergies   Allergen Reactions     No Known Drug Allergies      Data   Most Recent 3 CBC's:Recent Labs   Lab Test 01/25/23  1014 01/24/23  0847 01/22/23  0806 01/18/23  0504   WBC  --  9.9 14.6* 12.4*   HGB  --  13.5 14.3 12.6*   MCV  --  85 86 85    244 273 206      Most Recent 3 BMP's:  Recent Labs   Lab Test 01/26/23  0835 01/26/23  0157 01/25/23  2221 01/24/23  1201 01/24/23  0847 01/21/23  1256  01/21/23  1049 01/20/23  1213 01/20/23  0946   NA  --   --   --   --  136  --  136  --  138   POTASSIUM  --   --   --   --  3.5  --  4.2  --  4.0   CHLORIDE  --   --   --   --  94*  --  97*  --  98   CO2  --   --   --   --  31*  --  29  --  29   BUN  --   --   --   --  25.9*  --  23.2*  --  21.9   CR  --   --   --   --  1.43*  --  1.21*  --  1.24*   ANIONGAP  --   --   --   --  11  --  10  --  11   LLOYD  --   --   --   --  9.5  --  10.0  --  10.0   * 301* 340*   < > 138*   < > 353*   < > 214*    < > = values in this interval not displayed.     Most Recent 2 LFT's:  Recent Labs   Lab Test 01/15/23  1321 03/22/21  0000   AST 11 12   ALT 11 5   ALKPHOS 76 62   BILITOTAL 1.6* 0.5     Most Recent INR's and Anticoagulation Dosing History:  Anticoagulation Dose History     Recent Dosing and Labs Latest Ref Rng & Units 12/31/2019 2/26/2020 4/16/2020 5/10/2020 7/17/2020 11/21/2020 1/15/2023    INR 0.85 - 1.15 1.65(H) 1.28(H) 1.88(H) 1.49(H) 1.18(H) 1.50(H) 1.13        Most Recent 3 Troponin's:  Recent Labs   Lab Test 03/17/21  0752 07/08/20  1223 05/21/20  1302   TROPI <0.015 <0.015 0.034     Most Recent Cholesterol Panel:  Recent Labs   Lab Test 01/17/23  0436   CHOL 112   LDL 52   HDL 50   TRIG 51     Most Recent 6 Bacteria Isolates From Any Culture (See EPIC Reports for Culture Details):  Recent Labs   Lab Test 11/22/20  0954 11/20/20  1756 07/11/20  1500 07/10/20  2302 07/10/20  2221 05/21/20  1916   CULT No anaerobes isolated  Light growth  Serratia marcescens  *  Light growth  Escherichia coli  * Cultured on the 1st day of incubation:  Escherichia coli  *  Critical Value/Significant Value, preliminary result only, called to and read back by  Emilee Benito RN at 0640 11/21/20 hg    (Note)  POSITIVE for E.COLI by [employer] multiplex nucleic acid test. Final  identification and antimicrobial susceptibility testing will be  verified by standard methods. Verigene test will not distinguish  E.coli from Shigella  species including S.dysenteriae, S.flexneri,  S.boydii, and S.sonnei. Specimens containing Shigella species or  E.coli will be reported as Positive for E.coli.    Specimen tested with [employer] multiplex, gram-negative blood culture  nucleic acid test for the following targets: Acinetobacter sp.,  Citrobacter sp., Enterobacter sp., Proteus sp., E. coli, K.  pneumoniae/oxytoca, P. aeruginosa, and the following resistance  markers: CTXM, KPC, NDM, VIM, IMP and OXA.    Critical Value/Significant Value called to and read back by  Lena Andrews RN 0856 11/21/20 AM      Cultured on the 1st day of incubation:  Escherichia coli  Susceptibility testing done on previous specimen  *  Critical Value/Significant Value, preliminary result only, called to and read back by  Lena Andrews RN 0834 11/21/20 AM   Moderate growth  Staphylococcus aureus  *  Critical Value/Significant Value, preliminary result only, called to and read back by  Alexandra Washburn RN 7.12.20 1357. ALEX   No growth No growth No growth     Most Recent TSH, T4 and A1c Labs:  Recent Labs   Lab Test 01/17/23  0436 01/15/23  1321 11/20/20  2233 08/10/20  1027   TSH  --  3.11   < > 4.75*   T4  --   --   --  1.11   A1C 7.2*  --    < >  --     < > = values in this interval not displayed.     Results for orders placed or performed during the hospital encounter of 01/15/23   XR Chest 2 Views    Narrative    EXAM: XR CHEST 2 VIEWS  LOCATION: [facility]  DATE/TIME: 1/15/2023 3:02 PM    INDICATION: gen weakness  COMPARISON: 12/15/2020      Impression    IMPRESSION: Small patchy opacity in the lung bases best seen on the lateral view is new, and could be due to superimposed soft tissue shadows, pneumonia or mass. Consider a follow-up chest CT for better evaluation. Stable linear opacities in the left   midlung and lung base, likely related to chronic fibrosis. Stable left costophrenic space blunting, likely related to chronic pleural thickening.  No right pleural effusion or pneumothorax bilaterally. Stable mild cardiomegaly and enlarged main pulmonary   artery. Old healed right lateral rib fracture and mid sternal fracture deformities.   CT Head w/o Contrast    Narrative    EXAM: CT HEAD W/O CONTRAST  LOCATION: [facility]  DATE/TIME: 1/15/2023 3:08 PM    INDICATION: altered mental status  COMPARISON: 03/17/2021  TECHNIQUE: Routine CT Head without IV contrast. Multiplanar reformats. Dose reduction techniques were used.    FINDINGS:  INTRACRANIAL CONTENTS: Large, isolated intraventricular hemorrhage within the right lateral ventricle involving the atria, temporal horn and occipital horn. No additional intracranial blood products are noted elsewhere. No CT evidence of acute infarct.   Moderate presumed chronic small vessel ischemic changes. Moderate generalized volume loss. No hydrocephalus. Minimally dilated right occipital horn. Ventricular size and morphology is otherwise unchanged.    VISUALIZED ORBITS/SINUSES/MASTOIDS: No intraorbital abnormality. No paranasal sinus mucosal disease. No middle ear or mastoid effusion.    BONES/SOFT TISSUES: No acute abnormality.      Impression    IMPRESSION:  1.  Large isolated intraventricular hemorrhage of the right lateral ventricle. A CT angiogram is recommended for further evaluation to assess for potential vascular malformation. Pending angiography findings, an MRI may be indicated.  2.  Minimal interval increase in size of the occipital horn of the right lateral ventricle. Ventricular size and morphology is otherwise no change.  3.  Probable moderate sequela of chronic small vessel ischemic disease.    These findings were discussed with Dr. Hamilton at 1:33 PM on 01/15/2023.   CTA Head with Contrast    Narrative    EXAM: CTA HEAD WITH CONTRAST  LOCATION: [facility]  DATE/TIME: 1/15/2023 4:39 PM    INDICATION: large intraventricular hemorrhage  COMPARISON: CT head  01/15/2023 2:49 PM  CONTRAST: 75mL Isovue 370  TECHNIQUE: Axial helical CT images of the intracranial vessels obtained during the arterial phase of intravenous contrast administration. Axial helical 2D reconstructed images and multiplanar 3D MIP reconstructed images of the intracranial vessels were   performed by the technologist. Dose reduction techniques were used.     FINDINGS:   ANTERIOR CIRCULATION: Grossly similar intraventricular hemorrhage given differences in technique. No evidence of active extravasation. No stenosis/occlusion, aneurysm, or high flow vascular malformation. There is nonstenotic atherosclerotic calcification   of the bilateral carotid siphons. Standard [place] anatomy.    POSTERIOR CIRCULATION: There is focal short signal moderate stenosis of the bilateral P1 segment posterior cerebral artery. No occlusion, aneurysm, or high flow vascular malformation. Balanced vertebral arteries supply a normal basilar artery.   Administration of the proximal basilar artery is incidentally noted.    DURAL VENOUS SINUSES: Not well evaluated on a technical basis.        Impression    IMPRESSION:   1.  Negative for intracranial aneurysm or AVM.  2.  Grossly similar intraventricular hemorrhage without evidence of active extravasation.  3.  Focal moderate stenosis of the bilateral P1 posterior cerebral artery.   CT Head w/o Contrast    Narrative    EXAM: CT HEAD W/O CONTRAST  LOCATION: [facility]  DATE/TIME: 1/15/2023 7:51 PM    INDICATION: rpt eval of R IVH  COMPARISON: 01/15/2023  TECHNIQUE: Routine CT Head without IV contrast. Multiplanar reformats. Dose reduction techniques were used.    FINDINGS:  INTRACRANIAL CONTENTS: Large volume ascites if intraventricular hemorrhage of the right lateral ventricle has not changed No CT evidence of acute infarct. Moderate presumed chronic small vessel ischemic changes. Ventriculomegaly disproportionate to the   degree of volume loss.  Correlate for normal pressure hydrocephalus. Ventricular size and morphology is unchanged. Unchanged size and appearance of the occipital and temporal horns of the right lateral ventricle.    VISUALIZED ORBITS/SINUSES/MASTOIDS: No intraorbital abnormality. No paranasal sinus mucosal disease. No middle ear or mastoid effusion.    BONES/SOFT TISSUES: No acute abnormality.      Impression    IMPRESSION:  1.  Unchanged isolated intraventricular hemorrhage in the right lateral ventricle.  2.  Stable ventricular size and morphology.  3.  Probable moderate sequela of chronic small vessel ischemic disease and brain parenchymal volume loss.   CT Head w/o Contrast    Narrative    CT OF THE HEAD WITHOUT CONTRAST 1/16/2023 8:56 AM     COMPARISON: Head CT 1/15/2023.    HISTORY: Follow-up intraventricular hemorrhage.    TECHNIQUE: 5 mm thick axial CT images of the head were acquired  without IV contrast material.    FINDINGS: Intraventricular hemorrhage in the posterior body, atrium  and temporal horn of the right lateral ventricle again noted without  significant change. There is new minimal layering hemorrhage in the  occipital horn of the left lateral ventricle that may simply represent  redistribution of hemorrhage throughout the ventricular system. No  definite evidence for new or increasing intracranial hemorrhage.     There is moderate diffuse cerebral volume loss. There are extensive  confluent areas of decreased density in the cerebral white matter  bilaterally that are consistent with sequela of chronic small vessel  ischemic disease. The basal cisterns are within normal limits in  configuration given the degree of cerebral volume loss.  There is no  midline shift. There are no extra-axial fluid collections.    No intracranial mass or recent infarct.    The visualized paranasal sinuses are well-aerated. There is no  mastoiditis. There are no fractures of the visualized bones.      Impression    IMPRESSION:   1. Stable  intraventricular hemorrhage in the right lateral ventricle  with probable redistribution of a small amount of hemorrhage into the  occipital horn of the left lateral ventricle. No definite evidence for  new or increasing hemorrhage.  2. Diffuse cerebral volume loss and cerebral white matter changes  consistent with chronic small vessel ischemic disease.    Radiation dose for this scan was reduced using automated exposure  control, adjustment of the mA and/or kV according to patient size, or  iterative reconstruction technique.      RICHMOND SINGH MD         SYSTEM ID:  [id number]   MR Brain w/o & w Contrast    Narrative    MRI BRAIN WITHOUT AND WITH CONTRAST  1/18/2023 10:05 AM     HISTORY: [facility]     TECHNIQUE: Multiplanar, multisequence MRI of the brain without and  with 7 mL Gadavist.     COMPARISON: Several prior comparisons, most recent head CT 1/17/2023.     FINDINGS: Ovoid parenchymal hemorrhage in the right periatrial white  matter with intraventricular extension into the right lateral  ventricle, this hemorrhage is heterogeneous on T1 with hyper and  hypointense components and is hypointense on T2. Small amount of  hemorrhage also in the occipital horn of the left lateral ventricle.  Moderate T2 hyperintense edema surrounding the parenchymal hemorrhage.  No significant midline shift or herniation. Areas of restricted  diffusion in the right parietal lobe and left parietal/occipital  region. Smaller areas of cortical restricted diffusion in the medial  occipital lobes bilaterally. Small area of restricted diffusion in the  right basal ganglia. T2 hyperintensity is present in the areas of  restricted diffusion. Moderate diffuse parenchymal volume loss. Patchy  periventricular white matter T2 hyperintensities are nonspecific, but  most likely related to chronic microvascular ischemic disease.  Ventricles are prominent in size but not significantly enlarged out of  proportion to the cerebral sulci. Several foci of  susceptibility  hypointensity in the cerebellum probably due to chronic  microhemorrhages.    Thin dural based enhancement over the right cerebral convexity  measuring 1.7 x 1.9 x 0.5 cm (series 12 image 17) which most likely  represents a meningioma.    No suspicious enhancement in the brain parenchyma.     The facial structures appear normal. The major arterial T2 flow voids  at the base of the brain appear patent.       Impression    IMPRESSION:    1. Again seen is the parenchymal hemorrhage in the right periatrial  white matter with intraventricular extension into the right lateral  ventricle with small amount of hemorrhage in the left lateral  ventricle. Moderate T2 hyperintense edema around the parenchymal  hemorrhage. No definite underlying enhancing mass although a mass  could be obscured by the hemorrhage. No evidence of hydrocephalus or  ventricular entrapment.  2. Scattered areas of acute/subacute appearing infarcts within both  cerebral hemispheres and the right basal ganglia as detailed above. No  definite hemorrhagic transformation of infarct. No midline shift or  herniation.  3. Moderate diffuse parenchymal volume loss and white matter changes  most likely due to chronic microvascular ischemic disease.  4. Several probable chronic microhemorrhages in the cerebellum.  5. Thin dural based enhancing lesion over the right cerebral convexity  most likely representing a meningioma.      CLINT CAAL MD         SYSTEM ID:  [id number]   CT Chest w/o Contrast    Narrative    EXAM: CT CHEST W/O CONTRAST  LOCATION: [facility]  DATE/TIME: 1/16/2023 6:05 PM    INDICATION: Follow-up chest x-ray, bilateral base opacities.  COMPARISON: CT of the chest 08/05/2020  TECHNIQUE: CT chest without IV contrast. Multiplanar reformats were obtained. Dose reduction techniques were used.  CONTRAST: None.    FINDINGS:   LUNGS AND PLEURA: Since 08/05/2020, the prior seen left hydropneumothorax has  resolved, otherwise I do not appreciate any other significant changes. There are 2 stable adjacent areas of groundglass infiltrate seen with the largest measuring approximately   11.4 x 9.8 mm. These are seen in the upper aspect of the right upper lobe laterally. There is some persistent left pleural thickening with some associated calcifications and adjacent atelectasis. Sequelae of prior granulomatous infection.    MEDIASTINUM/AXILLAE: Sequelae of prior granulomatous infection.    CORONARY ARTERY CALCIFICATION: Marked trivessel.    UPPER ABDOMEN: Scattered benign calcified splenic and hepatic granulomas.    MUSCULOSKELETAL: Numerous old rib fractures. Old appearing mild compression fractures at the T2 and L1 levels.      Impression    IMPRESSION:   1.  Since 08/05/2020, the prior seen left hydropneumothorax has resolved. Otherwise the chest is stable.    2.  Chronic left pleural thickening with some associated calcification and adjacent atelectasis.    3.  Old compression fractures and bilateral rib fractures.    4.  Stable groundglass infiltrates in the right upper lobe with the largest measuring approximately 11.4 x 9.8 mm. Please refer to below report for possible follow-up.    REFERENCE:  Guidelines for Management of Incidental Pulmonary Nodules Detected on CT Images: From the Fleischner Society 2017.   Guidelines apply to incidental nodules in patients who are 35 years or older.  Guidelines do not apply to lung cancer screening, patients with immunosuppression, or patients with known primary cancer.    SUBSOLID NODULES  Ground glass  Nodule size <6 mm  No routine follow-up. If suspicious, consider follow-up at 2 and 4 years.    Nodule size 6 mm or greater  CT at 6-12 months to confirm persistence, then CT every 2 years until 5 years.    Part solid  Nodule size <6 mm  No routine follow-up.    Nodule size 6 mm or greater  CT at 3-6 months to confirm persistence. If unchanged and solid component remains <6 mm,  annual CT for 5 years.    Multiple  CT at 3-6 months. If stable, consider CT at 2 and 4 years. Management based on the most suspicious nodule.    Consider referral to lung nodule clinic.       CT Head w/o Contrast    Narrative    EXAM: CT HEAD W/O CONTRAST  LOCATION: [facility]  DATE/TIME: 1/17/2023 2:02 AM    INDICATION: Hemorrhage.  COMPARISON: Multiple prior studies, the most recent which is from 1/16/2023.  TECHNIQUE: Routine CT Head without IV contrast. Multiplanar reformats. Dose reduction techniques were used.    FINDINGS:  INTRACRANIAL CONTENTS: Small ovoid parenchymal hemorrhage in the right periatrial white matter again noted. Interventricular extension into the right occipital horn again noted which is grossly stable. Small amount of interventricular hemorrhage in the   left occipital horn. Minimal subarachnoid hemorrhage over the convexities is stable apart from perhaps slight increase in conspicuity of a left parieto-occipital focus. Volume loss and presumed chronic small vessel ischemia are stable. Ventricular size   is unchanged.    VISUALIZED ORBITS/SINUSES/MASTOIDS: No intraorbital abnormality. No paranasal sinus mucosal disease. No middle ear or mastoid effusion.    BONES/SOFT TISSUES: No acute abnormality.      Impression    IMPRESSION:  1.  Stable small right periatrial parenchymal hemorrhage with interventricular extension. Stable ventricular size.    2.  Mild subarachnoid hemorrhage over the convexities is again noted with perhaps slight increase in conspicuity of the left convexity compared to the prior study.

## 2023-01-26 NOTE — PLAN OF CARE
Goal Outcome Evaluation: [id number]    A&O x3, disoriented to time, forgetful at times, sets off bed alarm. VSS on RA. Tele: afib BB CVR. Neuros intact, diminished hearing an some tremors in upper extremities. Up 1 assist w/ walker and gait belt, generalized weakness. Denies pain, no headache. BS went down from 340 to 301 @ 0200. R hand skin tear covered with mepilex. Voiding spontaneously. PVR's less than 300. Needs to prompted to void at times. Left hand skin tear covered with mepilex. Daily weights. R PIV SL.     Discharge to TCU in am pending insurance authorization (medically stable).

## 2023-01-26 NOTE — PLAN OF CARE
Physical Therapy Discharge Summary    Reason for therapy discharge:    Discharged to transitional care facility.    Progress towards therapy goal(s). See goals on Care Plan in [facility] electronic health record for goal details.  Goals partially met.  Barriers to achieving goals:   discharge from facility.    Therapy recommendation(s):    Continued therapy is recommended.  Rationale/Recommendations:  Patient would benefit from TCU to progress his balance and independence with amb back to baseline.    Goal Outcome Evaluation:

## 2023-01-26 NOTE — PLAN OF CARE
Physical Therapy Discharge Summary    Reason for therapy discharge:    Discharged to transitional care facility.    Progress towards therapy goal(s). See goals on Care Plan in [facility] electronic health record for goal details.  Goals not met.  Barriers to achieving goals:   discharge from facility.    Therapy recommendation(s):    Continued therapy is recommended.  Rationale/Recommendations:  Patient would benefit from continued skilled PT to maximize independence.    Goal Outcome Evaluation:

## 2023-01-26 NOTE — PROGRESS NOTES
Care Management Discharge Note    Discharge Date: 01/26/2023       Discharge Disposition: Transitional Care at [facility] TCU    Discharge Services: None    Discharge DME: None    Discharge Transportation: agency, family or friend will provide    Private pay costs discussed: cost of private room discussed with pt and daughter as well as transport costs.They are agreeable and request private room at [facility] along with ride to [facility].    PAS Confirmation Code: [id number]  Patient/family educated on Medicare website which has current facility and service quality ratings: yes    Education Provided on the Discharge Plan:  yes  Persons Notified of Discharge Plans: hospitalist, CTRN,RN,HUC,BB, [facility] admissions, daughter and wife  Patient/Family in Agreement with the Plan: yes    Handoff Referral Completed: Yes    Additional Information:  Discharge orders sent via DOD.  [facility] w/c ride set up for 14:30 today.   Per Lore in admissions at [facility], pt's room will not be ready until after 16:00 today.  BERTHA spoke with Gigi at [facility] to reschedule to earliest time at 19;)) today.   BERTHA confirmed with Lore at [facility] that this was acceptable since orders are in [facility].  Hospital team and family updated of time change.   Pt's daughter requesting to speak with Hospitalist.  Hospitalist ordered a scan and discharge postponed.  BERTHA updated [facility] staff.  BERTHA cancelled [facility] ride.    JERE Pagan  [facility]  Care Transitions  [phone]

## 2023-01-26 NOTE — PROGRESS NOTES
MD Notification    Notified Person: MD    Notified Person Name: Dr Radha Fu    Notification Date/Time:1/26/23 3722    Notification Interaction:Vocera page    Purpose of Notification:Daughter Brittani was concerned with pt's cognitive behavior.      Orders Received:CT scan, If CT scan results negative pt is able to discharge to TCU    Comments:Dr. Fu reviewed results of the CT and No new changes in the CT.  Pt is able to discharge.

## 2023-01-26 NOTE — PROGRESS NOTES
Pt A&O x4, forgetful. Generalized weakness. Up x1 with walker and GB. VSS. RA. Denied pain except headache, PRN Tyl given. Tele: A fib. Ambulated to the BR, up in chair. SLP evaluation today, minced &moist with and thin liquid diet. Poor appetite. BG monitor and covered with insulin. R hand skin tear dressing intact. Possible discharge to TCU tomorrow.

## 2023-01-27 ENCOUNTER — TELEPHONE (OUTPATIENT)
Dept: NEUROSURGERY | Facility: CLINIC | Age: 83
End: 2023-01-27
Payer: COMMERCIAL

## 2023-01-27 ENCOUNTER — TELEPHONE (OUTPATIENT)
Dept: GERIATRICS | Facility: CLINIC | Age: 83
End: 2023-01-27
Payer: COMMERCIAL

## 2023-01-27 VITALS
RESPIRATION RATE: 16 BRPM | DIASTOLIC BLOOD PRESSURE: 70 MMHG | HEIGHT: 60 IN | WEIGHT: 140.7 LBS | OXYGEN SATURATION: 94 % | BODY MASS INDEX: 27.62 KG/M2 | SYSTOLIC BLOOD PRESSURE: 133 MMHG | TEMPERATURE: 98 F | HEART RATE: 70 BPM

## 2023-01-27 LAB
GLUCOSE BLDC GLUCOMTR-MCNC: 178 MG/DL (ref 70–99)
GLUCOSE BLDC GLUCOMTR-MCNC: 251 MG/DL (ref 70–99)
GLUCOSE BLDC GLUCOMTR-MCNC: 378 MG/DL (ref 70–99)

## 2023-01-27 PROCEDURE — 250N000013 HC RX MED GY IP 250 OP 250 PS 637: Performed by: INTERNAL MEDICINE

## 2023-01-27 PROCEDURE — 250N000011 HC RX IP 250 OP 636: Performed by: HOSPITALIST

## 2023-01-27 PROCEDURE — 250N000013 HC RX MED GY IP 250 OP 250 PS 637: Performed by: HOSPITALIST

## 2023-01-27 PROCEDURE — 99239 HOSP IP/OBS DSCHRG MGMT >30: CPT | Performed by: INTERNAL MEDICINE

## 2023-01-27 RX ADMIN — TAMSULOSIN HYDROCHLORIDE 0.4 MG: 0.4 CAPSULE ORAL at 08:01

## 2023-01-27 RX ADMIN — AMLODIPINE BESYLATE 10 MG: 10 TABLET ORAL at 08:02

## 2023-01-27 RX ADMIN — HEPARIN SODIUM 5000 UNITS: 5000 INJECTION, SOLUTION INTRAVENOUS; SUBCUTANEOUS at 08:02

## 2023-01-27 RX ADMIN — CARVEDILOL 12.5 MG: 12.5 TABLET, FILM COATED ORAL at 08:01

## 2023-01-27 ASSESSMENT — ACTIVITIES OF DAILY LIVING (ADL)
ADLS_ACUITY_SCORE: 27

## 2023-01-27 NOTE — PLAN OF CARE
Reason for Admission: IVH     Cognitive/Mentation: A&O x 2. Self and place  Neuros/CMS: Intact ex forgetful, general weakness  VS: Stable on RA   Tele: A-fib CVR  GI: Continent  : Continent.  Pulmonary: LS clear.  Pain: denies     Drains/Lines: PIV - SL  Skin: intact  Activity: Assist x SBA, GB  Diet: Mod CHO, thin liquids. Takes pills whole.      Therapies recs: [facility]  Discharge: Plan for today     End of shift summary: Pt stable overnight.

## 2023-01-27 NOTE — PLAN OF CARE
Goal Outcome Evaluation:         A/Ox2. VSS on RA. Blood sugars (251, 378) A1 GB walker. Continent. Denies pain. IV removed. Discharge package printed and given to transport. Discharge to [facility] via wheelchair w/ transport.

## 2023-01-27 NOTE — PROGRESS NOTES
Care Management Follow Up    Length of Stay (days): 12    Expected Discharge Date: 01/27/2023     Concerns to be Addressed:       Patient plan of care discussed at interdisciplinary rounds: Yes    Anticipated Discharge Disposition: Transitional Care     Anticipated Discharge Services: None  Anticipated Discharge DME: None    Patient/family educated on Medicare website which has current facility and service quality ratings: yes  Education Provided on the Discharge Plan:    Patient/Family in Agreement with the Plan: yes    Referrals Placed by CM/SW:    Private pay costs discussed: Not applicable    Additional Information:  Patient's discharge plan scheduled for today to [facility]. [facility] wheelchair ride set up for 1245. SW confirmed this with patient's daughter Camille. Patient and family in agreement with this plan. Olivia is also aware and in agreement.    SW will continue to follow.       JERE West

## 2023-01-27 NOTE — PROGRESS NOTES
Speech Language Therapy Discharge Summary    Reason for therapy discharge:    Discharged to transitional care facility.    Progress towards therapy goal(s). See goals on Care Plan in [facility] electronic health record for goal details.  Goals partially met.  Barriers to achieving goals:   discharge from facility.    Therapy recommendation(s):    Continued therapy is recommended.  Rationale/Recommendations:  Recommend follow up SLP services to ensure safety with least restrictive oral intake, discharge diet recommended was minced and moist solids with thin liquids.  Recommend patient remain upright 1-2 hours after meals due to esophageal phase dysphagia concerns. .

## 2023-01-27 NOTE — TELEPHONE ENCOUNTER
2nd attempt at workque referral- Called patient and no answer LVM    Referred by Tosin Link PA-C  Meningioma (H) [D32.9]  f/u for R cerebral convexity meningioma  MRI Brain 1/18/23 - [facility]  CT Head 1/15/23, 1/16/23, 1/17/23 - [facility]  PT -  Injections -   Surgeries -

## 2023-01-27 NOTE — DISCHARGE SUMMARY
[facility]    Discharge Summary  Hospitalist    Date of Admission:  1/15/2023  Date of Discharge:  1/27/2023 12:49 PM  Discharging Provider: Radha Fu MD    Discharge Diagnoses   Nontraumatic intraventricular hemorrhage  Chronic Diastolic Heart Failure    History of Present Illness   Please review admission history and physical.    Hospital Course   Tc Garcia was admitted on 1/15/2023.  The following problems were addressed during his hospitalization:    Principal Problem:    Nontraumatic intraventricular intracerebral hemorrhage (H)  Active Problems:    Essential hypertension    Anticoagulated    Right-sided nontraumatic intraventricular intracerebral hemorrhage (H)    Tc Garcia was admitted on 1/15/2023.  The following problems were addressed during his hospitalization:     Principal Problem:    Nontraumatic intraventricular intracerebral hemorrhage (H)  Active Problems:    Essential hypertension    Anticoagulated    Right-sided nontraumatic intraventricular intracerebral hemorrhage (H)     Tc Garcia, 82 year old male with PMH of stage III chronic kidney disease, anemia, paroxysmal atrial fibrillation on anticoagulation, pulmonary hypertension, essential hypertension, type 2 diabetes mellitus on insulin pump presents with increasing weakness, dizziness and lightheadedness with new intraventricular hemorrhage.     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 CT imaging in the ED showed large intraventricular hemorrhage of the right lateral  ventricle.  He received Kcentra for anticoagulation reversal. Patient reports history of multiple falls including prior to this admission, no known hit to head, patient states general imbalance with turning. Anticoagulation reversed in ER.  Needed nicardipine drip for blood pressure control, now on oral medications. Follow-up CT head on 1/16 showed stable hemorrhage.  *MRI on 118, showed stable parenchymal hemorrhage in the right parietal white matter with intraventricular extension into the right lateral ventricle with a small amount of hemorrhage in the left lateral ventricle.  Also noted for scattered area of acute/subacute appearing infarcts within both cerebral hemispheres in the right basal ganglia.  Also noted dural based enhancing lesion over the right cerebral convexity most likely representing meningioma.   * after acute symptoms resolved, needs decision regarding risk/benefits of ongoing AC for JONAH. perla given history of multiple falls and presentation with IVH.  See blood pressure management below, he did receive subcu heparin for DVT prophylaxis to prior to discharge, patient was seen by PT/OT, DC has been recommended, seen by neurology this admission and plan is to follow-up outpatient with stroke neurology.        Hypertension  Presented with hypertensive urgency as above. PTA on Coreg 6.25 mg twice daily and Avapro 450 mg nightly.  He had initially in the car needed nicardipine drip in the ICU which has since been weaned off.    Patient is currently reviewed receiving amlodipine 10 mg, his carvedilol has been increased to 25 mg twice daily due to bradycardia later on further reduced to 12.5 twice daily, doxazosin 1 mg daily added on 1/22, later it was stopped and Avapro was added, currently patient is on 300 mg prior to bedtime, increased that to 450 mg as per her PTA dose on discharge due to elevated blood pressures.  His blood pressure goal is less than 160 systolic, long-term blood pressure goal is  130/80 or less        Type 2 diabetes mellitus, insulin-dependent, insulin pump  Hold off on insulin pump at this time given above issues.  Patient had some hypoglycemic episodes here, Lantus dose has been reduced further, he was on insulin pump prior to this, on discharge to TCU will be sending with Premeal and sliding scale along with Lantus, monitor for hypoglycemia and patient could go back on insulin upon discharge.           Abnormal admission chest x-ray, patchy opacity lung bases  Indeterminate chest x-ray on admission, small patchy opacity lung bases, artifact versus pneumonia versus mass.  Denies cough, sputum, dyspnea.  Denies aspiration.  *Chest CT with nodules that will require outpatient follow-up per protocol, no acute finding suggestive of pneumonia.     Chronic kidney disease, stage III  Chart reviewed, creatinine appears to range between 1.4-1.7 over the last 2 years  -Creatinine 1.21 on 1/21, but has ranged between 1.5-1.7 during this hospital stay which appears to be his baseline, his creatinine close to discharge is 1.43.  Appears to be in range.        Possible urinary retention  -Had intermittently required straight catheterization per report, was started on Flomax here, patient later denied having any retention, he is voiding and PVR as below.  Continue Flomax on discharge.     Mild leukocytosis- resolved  WBC 16 on admission 10.7-14.6.  Afebrile.  No pyuria on UA.  Chest CT without acute findings of pneumonia.  Procalcitonin 0.20, patient had no antibiotics during the stay, white count normalized.      Radha Fu MD    Significant Results and Procedures       Pending Results     Unresulted Labs Ordered in the Past 30 Days of this Admission     No orders found from 12/16/2022 to 1/16/2023.          Code Status   DNR / DNI       Primary Care Physician   Jessika Cordoba    Physical Exam                      Vitals:    01/17/23 0500 01/23/23 0600 01/26/23 0200   Weight: 68.8 kg  (151 lb 10.8 oz) 65 kg (143 lb 4.8 oz) 63.8 kg (140 lb 11.2 oz)     Vital Signs with Ranges     I/O last 3 completed shifts:  In: 150 [P.O.:150]  Out: -     The patient was examined on the day of discharge.    Discharge Disposition   Discharged to nursing home  Condition at discharge: Stable    Consultations This Hospital Stay   NEUROSURGERY IP CONSULT  NEUROLOGY CRITICAL CARE ADULT IP CONSULT  PHARMACY IP CONSULT  PHYSICAL THERAPY ADULT IP CONSULT  OCCUPATIONAL THERAPY ADULT IP CONSULT  VASCULAR ACCESS ADULT IP CONSULT  CARE MANAGEMENT / SOCIAL WORK IP CONSULT  SPEECH LANGUAGE PATH ADULT IP CONSULT  PHYSICAL THERAPY ADULT IP CONSULT  OCCUPATIONAL THERAPY ADULT IP CONSULT  SPEECH LANGUAGE PATH ADULT IP CONSULT    Time Spent on this Encounter   I, Radha Fu MD, personally saw the patient today and spent greater than 30 minutes discharging this patient.    Discharge Orders      Neurosurgery Referral      Primary Care - Care Coordination Referral      General info for SNF    Length of Stay Estimate: Short Term Care: Estimated # of Days <30  Condition at Discharge: Improving  Level of care:skilled   Rehabilitation Potential: Excellent  Admission H&P remains valid and up-to-date: Yes  Recent Chemotherapy: N/A  Use Nursing Home Standing Orders: Yes     Mantoux instructions    Give two-step Mantoux (PPD) Per Facility Policy Yes     Follow Up and recommended labs and tests    Follow up with Nursing home physician.  No follow up labs or test are needed.follow up with neurology as recommended.     Reason for your hospital stay    Cerebrovascular accident     Activity - Up with nursing assistance     Physical Therapy Adult Consult    Evaluate and treat as clinically indicated.    Reason:  cva     Occupational Therapy Adult Consult    Evaluate and treat as clinically indicated.    Reason:  CVA     Speech Language Path Adult Consult    Evaluate and treat as clinically indicated.    Reason:  dysphagia     Fall precautions      Diet    Follow this diet upon discharge: Orders Placed This Encounter      Room Service      Combination Diet Moderate Consistent Carb (60 g CHO per Meal) Diet; Minced and Moist Diet (level 5); Thin Liquids (level 0)     Stroke Hospital Follow Up    [facility] will call you to coordinate care as prescribed by your provider. If you don t hear from a representative within 2 business days, please call [phone].    [facility] will [facility] you to coordinate care as prescribed by your provider. If you don t hear from a representative within 2 business days, please call [phone].       Discharge Medications   Discharge Medication List as of 1/27/2023 11:39 AM      START taking these medications    Details   amLODIPine (NORVASC) 10 MG tablet Take 1 tablet (10 mg) by mouth daily, Transitional      insulin glargine (LANTUS PEN) 100 UNIT/ML pen Inject 15 Units Subcutaneous At Bedtime, Disp-15 mL, TransitionalIf Lantus is not covered by insurance, may substitute Basaglar or Semglee or other insulin glargine product per insurance preference at same dose and frequency.        insulin lispro (HUMALOG) 100 UNIT/ML Cartridge Inject 1-10 Units Subcutaneous 3 times daily (before meals), Transitional1-20 units on sliding scale ,120-150-1 unit,[id number],201-250 4Units,[id number],592-561-89okfag,351-400 -12 units ,call md if>400      tamsulosin (FLOMAX) 0.4 MG capsule Take 1 capsule (0.4 mg) by mouth daily, Transitional      insulin aspart (NOVOLOG PEN) 100 UNIT/ML pen Inject 1-7 Units Subcutaneous 3 times daily (before meals), Disp-15 mL, Transitional         CONTINUE these medications which have CHANGED    Details   carvedilol (COREG) 12.5 MG tablet Take 1 tablet (12.5 mg) by mouth 2 times daily (with meals), Transitional      !! irbesartan (AVAPRO) 300 MG tablet Take 1 tablet (300 mg) by mouth At Bedtime, Transitional       !! - Potential duplicate medications found. Please discuss with provider.       CONTINUE these medications which have NOT CHANGED    Details   furosemide (LASIX) 40 MG tablet Take 1 1/2 tablets(60mg) by mouth every day, Disp-135 tablet, R-3, E-Prescribe      !! irbesartan (AVAPRO) 150 MG tablet Take 1 tablet (150 mg) by mouth At Bedtime, Disp-30 tablet, R-11, E-Prescribe      nystatin (MYCOSTATIN) 398626 UNIT/GM external cream Apply topically 2 times daily as needed Historical      glucagon 1 MG kit 1 mg as needed for low blood sugar, Historical       !! - Potential duplicate medications found. Please discuss with provider.      STOP taking these medications       apixaban ANTICOAGULANT (ELIQUIS) 5 MG tablet Comments:   Reason for Stopping:         cyclobenzaprine (FLEXERIL) 10 MG tablet Comments:   Reason for Stopping:         HUMALOG 100 UNIT/ML injection Comments:   Reason for Stopping:             Allergies   Allergies   Allergen Reactions     No Known Drug Allergies      Data   Most Recent 3 CBC's:Recent Labs   Lab Test 01/29/23  0633 01/25/23  1014 01/24/23  0847 01/22/23  0806   WBC 9.9  --  9.9 14.6*   HGB 14.4  --  13.5 14.3   MCV 84  --  85 86    270 244 273      Most Recent 3 BMP's:  Recent Labs   Lab Test 01/29/23  1341 01/29/23  0830 01/29/23  0701 01/29/23  0633 01/24/23  1201 01/24/23  0847 01/21/23  1256 01/21/23  1049   NA  --   --   --  136  --  136  --  136   POTASSIUM  --   --   --  3.5  --  3.5  --  4.2   CHLORIDE  --   --   --  96*  --  94*  --  97*   CO2  --   --   --  31*  --  31*  --  29   BUN  --   --   --  29.4*  --  25.9*  --  23.2*   CR  --   --   --  1.68*  --  1.43*  --  1.21*   ANIONGAP  --   --   --  9  --  11  --  10   LLOYD  --   --   --  9.6  --  9.5  --  10.0   * 146* 177* 135*   < > 138*   < > 353*    < > = values in this interval not displayed.     Most Recent 2 LFT's:  Recent Labs   Lab Test 01/15/23  1321 03/22/21  0000   AST 11 12   ALT 11 5   ALKPHOS 76 62   BILITOTAL 1.6* 0.5     Most Recent INR's and Anticoagulation Dosing  History:  Anticoagulation Dose History     Recent Dosing and Labs Latest Ref Rng & Units 12/31/2019 2/26/2020 4/16/2020 5/10/2020 7/17/2020 11/21/2020 1/15/2023    INR 0.85 - 1.15 1.65(H) 1.28(H) 1.88(H) 1.49(H) 1.18(H) 1.50(H) 1.13        Most Recent 3 Troponin's:  Recent Labs   Lab Test 03/17/21  0752 07/08/20  1223 05/21/20  1302   TROPI <0.015 <0.015 0.034     Most Recent Cholesterol Panel:  Recent Labs   Lab Test 01/17/23  0436   CHOL 112   LDL 52   HDL 50   TRIG 51     Most Recent 6 Bacteria Isolates From Any Culture (See EPIC Reports for Culture Details):  Recent Labs   Lab Test 11/22/20  0954 11/20/20  1756 07/11/20  1500 07/10/20  2302 07/10/20  2221 05/21/20  1916   CULT No anaerobes isolated  Light growth  Serratia marcescens  *  Light growth  Escherichia coli  * Cultured on the 1st day of incubation:  Escherichia coli  *  Critical Value/Significant Value, preliminary result only, called to and read back by  Emilee Benito RN at 0640 11/21/20 hg    (Note)  POSITIVE for E.COLI by Verigene multiplex nucleic acid test. Final  identification and antimicrobial susceptibility testing will be  verified by standard methods. Verigene test will not distinguish  E.coli from Shigella species including S.dysenteriae, S.flexneri,  S.boydii, and S.sonnei. Specimens containing Shigella species or  E.coli will be reported as Positive for E.coli.    Specimen tested with Verigene multiplex, gram-negative blood culture  nucleic acid test for the following targets: Acinetobacter sp.,  Citrobacter sp., Enterobacter sp., Proteus sp., E. coli, K.  pneumoniae/oxytoca, P. aeruginosa, and the following resistance  markers: CTXM, KPC, NDM, VIM, IMP and OXA.    Critical Value/Significant Value called to and read back by  Lena Andrews RN 0856 11/21/20 AM      Cultured on the 1st day of incubation:  Escherichia coli  Susceptibility testing done on previous specimen  *  Critical Value/Significant Value, preliminary result  only, called to and read back by  Lena Andrews RN 0834 11/21/20 AM   Moderate growth  Staphylococcus aureus  *  Critical Value/Significant Value, preliminary result only, called to and read back by  Alexandra Washburn RN 7.12.20 1357. ALEX   No growth No growth No growth     Most Recent TSH, T4 and A1c Labs:  Recent Labs   Lab Test 01/17/23  0436 01/15/23  1321 11/20/20  2233 08/10/20  1027   TSH  --  3.11   < > 4.75*   T4  --   --   --  1.11   A1C 7.2*  --    < >  --     < > = values in this interval not displayed.     Results for orders placed or performed during the hospital encounter of 01/15/23   XR Chest 2 Views    Narrative    EXAM: XR CHEST 2 VIEWS  LOCATION: [facility]  DATE/TIME: 1/15/2023 3:02 PM    INDICATION: gen weakness  COMPARISON: 12/15/2020      Impression    IMPRESSION: Small patchy opacity in the lung bases best seen on the lateral view is new, and could be due to superimposed soft tissue shadows, pneumonia or mass. Consider a follow-up chest CT for better evaluation. Stable linear opacities in the left   midlung and lung base, likely related to chronic fibrosis. Stable left costophrenic space blunting, likely related to chronic pleural thickening. No right pleural effusion or pneumothorax bilaterally. Stable mild cardiomegaly and enlarged main pulmonary   artery. Old healed right lateral rib fracture and mid sternal fracture deformities.   CT Head w/o Contrast    Narrative    EXAM: CT HEAD W/O CONTRAST  LOCATION: [facility]  DATE/TIME: 1/15/2023 3:08 PM    INDICATION: altered mental status  COMPARISON: 03/17/2021  TECHNIQUE: Routine CT Head without IV contrast. Multiplanar reformats. Dose reduction techniques were used.    FINDINGS:  INTRACRANIAL CONTENTS: Large, isolated intraventricular hemorrhage within the right lateral ventricle involving the atria, temporal horn and occipital horn. No additional intracranial blood products are noted  elsewhere. No CT evidence of acute infarct.   Moderate presumed chronic small vessel ischemic changes. Moderate generalized volume loss. No hydrocephalus. Minimally dilated right occipital horn. Ventricular size and morphology is otherwise unchanged.    VISUALIZED ORBITS/SINUSES/MASTOIDS: No intraorbital abnormality. No paranasal sinus mucosal disease. No middle ear or mastoid effusion.    BONES/SOFT TISSUES: No acute abnormality.      Impression    IMPRESSION:  1.  Large isolated intraventricular hemorrhage of the right lateral ventricle. A CT angiogram is recommended for further evaluation to assess for potential vascular malformation. Pending angiography findings, an MRI may be indicated.  2.  Minimal interval increase in size of the occipital horn of the right lateral ventricle. Ventricular size and morphology is otherwise no change.  3.  Probable moderate sequela of chronic small vessel ischemic disease.    These findings were discussed with Dr. Hamilton at 1:33 PM on 01/15/2023.   CTA Head with Contrast    Narrative    EXAM: CTA HEAD WITH CONTRAST  LOCATION: [facility]  DATE/TIME: 1/15/2023 4:39 PM    INDICATION: large intraventricular hemorrhage  COMPARISON: CT head 01/15/2023 2:49 PM  CONTRAST: 75mL Isovue 370  TECHNIQUE: Axial helical CT images of the intracranial vessels obtained during the arterial phase of intravenous contrast administration. Axial helical 2D reconstructed images and multiplanar 3D MIP reconstructed images of the intracranial vessels were   performed by the technologist. Dose reduction techniques were used.     FINDINGS:   ANTERIOR CIRCULATION: Grossly similar intraventricular hemorrhage given differences in technique. No evidence of active extravasation. No stenosis/occlusion, aneurysm, or high flow vascular malformation. There is nonstenotic atherosclerotic calcification   of the bilateral carotid siphons. Standard [place] [employer] [place] anatomy.    POSTERIOR  CIRCULATION: There is focal short signal moderate stenosis of the bilateral P1 segment posterior cerebral artery. No occlusion, aneurysm, or high flow vascular malformation. Balanced vertebral arteries supply a normal basilar artery.   Administration of the proximal basilar artery is incidentally noted.    DURAL VENOUS SINUSES: Not well evaluated on a technical basis.        Impression    IMPRESSION:   1.  Negative for intracranial aneurysm or AVM.  2.  Grossly similar intraventricular hemorrhage without evidence of active extravasation.  3.  Focal moderate stenosis of the bilateral P1 posterior cerebral artery.   CT Head w/o Contrast    Narrative    EXAM: CT HEAD W/O CONTRAST  LOCATION: [facility]  DATE/TIME: 1/15/2023 7:51 PM    INDICATION: rpt eval of R IVH  COMPARISON: 01/15/2023  TECHNIQUE: Routine CT Head without IV contrast. Multiplanar reformats. Dose reduction techniques were used.    FINDINGS:  INTRACRANIAL CONTENTS: Large volume ascites if intraventricular hemorrhage of the right lateral ventricle has not changed No CT evidence of acute infarct. Moderate presumed chronic small vessel ischemic changes. Ventriculomegaly disproportionate to the   degree of volume loss. Correlate for normal pressure hydrocephalus. Ventricular size and morphology is unchanged. Unchanged size and appearance of the occipital and temporal horns of the right lateral ventricle.    VISUALIZED ORBITS/SINUSES/MASTOIDS: No intraorbital abnormality. No paranasal sinus mucosal disease. No middle ear or mastoid effusion.    BONES/SOFT TISSUES: No acute abnormality.      Impression    IMPRESSION:  1.  Unchanged isolated intraventricular hemorrhage in the right lateral ventricle.  2.  Stable ventricular size and morphology.  3.  Probable moderate sequela of chronic small vessel ischemic disease and brain parenchymal volume loss.   CT Head w/o Contrast    Narrative    CT OF THE HEAD WITHOUT CONTRAST 1/16/2023 8:56  AM     COMPARISON: Head CT 1/15/2023.    HISTORY: Follow-up intraventricular hemorrhage.    TECHNIQUE: 5 mm thick axial CT images of the head were acquired  without IV contrast material.    FINDINGS: Intraventricular hemorrhage in the posterior body, atrium  and temporal horn of the right lateral ventricle again noted without  significant change. There is new minimal layering hemorrhage in the  occipital horn of the left lateral ventricle that may simply represent  redistribution of hemorrhage throughout the ventricular system. No  definite evidence for new or increasing intracranial hemorrhage.     There is moderate diffuse cerebral volume loss. There are extensive  confluent areas of decreased density in the cerebral white matter  bilaterally that are consistent with sequela of chronic small vessel  ischemic disease. The basal cisterns are within normal limits in  configuration given the degree of cerebral volume loss.  There is no  midline shift. There are no extra-axial fluid collections.    No intracranial mass or recent infarct.    The visualized paranasal sinuses are well-aerated. There is no  mastoiditis. There are no fractures of the visualized bones.      Impression    IMPRESSION:   1. Stable intraventricular hemorrhage in the right lateral ventricle  with probable redistribution of a small amount of hemorrhage into the  occipital horn of the left lateral ventricle. No definite evidence for  new or increasing hemorrhage.  2. Diffuse cerebral volume loss and cerebral white matter changes  consistent with chronic small vessel ischemic disease.    Radiation dose for this scan was reduced using automated exposure  control, adjustment of the mA and/or kV according to patient size, or  iterative reconstruction technique.      RICHMOND SINGH MD         SYSTEM ID:  [id number]   MR Brain w/o & w Contrast    Narrative    MRI BRAIN WITHOUT AND WITH CONTRAST  1/18/2023 10:05 AM     HISTORY: [facility]     TECHNIQUE:  Multiplanar, multisequence MRI of the brain without and  with 7 mL Gadavist.     COMPARISON: Several prior comparisons, most recent head CT 1/17/2023.     FINDINGS: Ovoid parenchymal hemorrhage in the right periatrial white  matter with intraventricular extension into the right lateral  ventricle, this hemorrhage is heterogeneous on T1 with hyper and  hypointense components and is hypointense on T2. Small amount of  hemorrhage also in the occipital horn of the left lateral ventricle.  Moderate T2 hyperintense edema surrounding the parenchymal hemorrhage.  No significant midline shift or herniation. Areas of restricted  diffusion in the right parietal lobe and left parietal/occipital  region. Smaller areas of cortical restricted diffusion in the medial  occipital lobes bilaterally. Small area of restricted diffusion in the  right basal ganglia. T2 hyperintensity is present in the areas of  restricted diffusion. Moderate diffuse parenchymal volume loss. Patchy  periventricular white matter T2 hyperintensities are nonspecific, but  most likely related to chronic microvascular ischemic disease.  Ventricles are prominent in size but not significantly enlarged out of  proportion to the cerebral sulci. Several foci of susceptibility  hypointensity in the cerebellum probably due to chronic  microhemorrhages.    Thin dural based enhancement over the right cerebral convexity  measuring 1.7 x 1.9 x 0.5 cm (series 12 image 17) which most likely  represents a meningioma.    No suspicious enhancement in the brain parenchyma.     The facial structures appear normal. The major arterial T2 flow voids  at the base of the brain appear patent.       Impression    IMPRESSION:    1. Again seen is the parenchymal hemorrhage in the right periatrial  white matter with intraventricular extension into the right lateral  ventricle with small amount of hemorrhage in the left lateral  ventricle. Moderate T2 hyperintense edema around the  parenchymal  hemorrhage. No definite underlying enhancing mass although a mass  could be obscured by the hemorrhage. No evidence of hydrocephalus or  ventricular entrapment.  2. Scattered areas of acute/subacute appearing infarcts within both  cerebral hemispheres and the right basal ganglia as detailed above. No  definite hemorrhagic transformation of infarct. No midline shift or  herniation.  3. Moderate diffuse parenchymal volume loss and white matter changes  most likely due to chronic microvascular ischemic disease.  4. Several probable chronic microhemorrhages in the cerebellum.  5. Thin dural based enhancing lesion over the right cerebral convexity  most likely representing a meningioma.      CLINT CAAL MD         SYSTEM ID:  [id number]   CT Chest w/o Contrast    Narrative    EXAM: CT CHEST W/O CONTRAST  LOCATION: [facility]  DATE/TIME: 1/16/2023 6:05 PM    INDICATION: Follow-up chest x-ray, bilateral base opacities.  COMPARISON: CT of the chest 08/05/2020  TECHNIQUE: CT chest without IV contrast. Multiplanar reformats were obtained. Dose reduction techniques were used.  CONTRAST: None.    FINDINGS:   LUNGS AND PLEURA: Since 08/05/2020, the prior seen left hydropneumothorax has resolved, otherwise I do not appreciate any other significant changes. There are 2 stable adjacent areas of groundglass infiltrate seen with the largest measuring approximately   11.4 x 9.8 mm. These are seen in the upper aspect of the right upper lobe laterally. There is some persistent left pleural thickening with some associated calcifications and adjacent atelectasis. Sequelae of prior granulomatous infection.    MEDIASTINUM/AXILLAE: Sequelae of prior granulomatous infection.    CORONARY ARTERY CALCIFICATION: Marked trivessel.    UPPER ABDOMEN: Scattered benign calcified splenic and hepatic granulomas.    MUSCULOSKELETAL: Numerous old rib fractures. Old appearing mild compression fractures at the T2 and  L1 levels.      Impression    IMPRESSION:   1.  Since 08/05/2020, the prior seen left hydropneumothorax has resolved. Otherwise the chest is stable.    2.  Chronic left pleural thickening with some associated calcification and adjacent atelectasis.    3.  Old compression fractures and bilateral rib fractures.    4.  Stable groundglass infiltrates in the right upper lobe with the largest measuring approximately 11.4 x 9.8 mm. Please refer to below report for possible follow-up.    REFERENCE:  Guidelines for Management of Incidental Pulmonary Nodules Detected on CT Images: From the Fleischner Society 2017.   Guidelines apply to incidental nodules in patients who are 35 years or older.  Guidelines do not apply to lung cancer screening, patients with immunosuppression, or patients with known primary cancer.    SUBSOLID NODULES  Ground glass  Nodule size <6 mm  No routine follow-up. If suspicious, consider follow-up at 2 and 4 years.    Nodule size 6 mm or greater  CT at 6-12 months to confirm persistence, then CT every 2 years until 5 years.    Part solid  Nodule size <6 mm  No routine follow-up.    Nodule size 6 mm or greater  CT at 3-6 months to confirm persistence. If unchanged and solid component remains <6 mm, annual CT for 5 years.    Multiple  CT at 3-6 months. If stable, consider CT at 2 and 4 years. Management based on the most suspicious nodule.    Consider referral to lung nodule clinic.       CT Head w/o Contrast    Narrative    EXAM: CT HEAD W/O CONTRAST  LOCATION: [facility]  DATE/TIME: 1/17/2023 2:02 AM    INDICATION: Hemorrhage.  COMPARISON: Multiple prior studies, the most recent which is from 1/16/2023.  TECHNIQUE: Routine CT Head without IV contrast. Multiplanar reformats. Dose reduction techniques were used.    FINDINGS:  INTRACRANIAL CONTENTS: Small ovoid parenchymal hemorrhage in the right periatrial white matter again noted. Interventricular extension into the right  occipital horn again noted which is grossly stable. Small amount of interventricular hemorrhage in the   left occipital horn. Minimal subarachnoid hemorrhage over the convexities is stable apart from perhaps slight increase in conspicuity of a left parieto-occipital focus. Volume loss and presumed chronic small vessel ischemia are stable. Ventricular size   is unchanged.    VISUALIZED ORBITS/SINUSES/MASTOIDS: No intraorbital abnormality. No paranasal sinus mucosal disease. No middle ear or mastoid effusion.    BONES/SOFT TISSUES: No acute abnormality.      Impression    IMPRESSION:  1.  Stable small right periatrial parenchymal hemorrhage with interventricular extension. Stable ventricular size.    2.  Mild subarachnoid hemorrhage over the convexities is again noted with perhaps slight increase in conspicuity of the left convexity compared to the prior study.   CT Head w/o Contrast    Narrative    EXAM: CT HEAD W/O CONTRAST  LOCATION: [facility]  DATE/TIME: 1/26/2023 5:27 PM    INDICATION: Change in mental status  COMPARISON: 01/16/2023  TECHNIQUE: Routine CT Head without IV contrast. Multiplanar reformats. Dose reduction techniques were used.    FINDINGS:  INTRACRANIAL CONTENTS: Evolving right periventricular hemorrhage with intraventricular extension. Blood products have decreased from the comparison exams. No new or worsening blood products noted. No extra axial collection, or midline shift. Unchanged   multiple low-attenuation lesions throughout the hemispheric white matter including more focal low-attenuation along the margin of the right occipital horn. Moderate ventriculomegaly. Ventricular size and morphology is similar to the prior exam.     VISUALIZED ORBITS/SINUSES/MASTOIDS: No intraorbital abnormality. No paranasal sinus mucosal disease. No middle ear or mastoid effusion.    BONES/SOFT TISSUES: No acute abnormality.      Impression    IMPRESSION:  1.  Evolving right  periventricular blood products with intraventricular extension, decreased from the comparison exam. No new or worsening intracranial hemorrhage.  2.  Stable ventricular size and morphology.  3.  Unchanged white matter low-attenuation lesions including more focal low-attenuation along the right occipital horn.

## 2023-01-27 NOTE — PLAN OF CARE
A&ox 3, forgetful. VSS on RA. Up SBA with GB. Neuros WDL. Tele Afib CVR. Pt to discharge to TCU at 1900.  CT scan done prior to discharge.  Unsure of results of CT the ride was canceled.

## 2023-01-27 NOTE — PROGRESS NOTES
Care Management Discharge Note    Discharge Date: 01/27/2023       Discharge Disposition: Transitional Care    Discharge Services: Other (see comment) (therapy)    Discharge DME: None    Discharge Transportation: agency, family or friend will provide    Private pay costs discussed: Not applicable    PAS Confirmation Code: [id number]  Patient/family educated on Medicare website which has current facility and service quality ratings: yes    Education Provided on the Discharge Plan:    Persons Notified of Discharge Plans: yes  Patient/Family in Agreement with the Plan: yes    Handoff Referral Completed: Yes    Additional Information:  Patient is discharging to [facility] via [facility] wheelchair transport at 1245. Patient and family aware and in agreement. Discharge orders have been signed and faxed. PAS has been completed.     PAS-RR    D: Per DHS regulation, SW completed and submitted PAS-RR to [place] [employer] Direct Connect via the Senior LinkAge Line.  PAS-RR confirmation # is : [id number]    I: SW spoke with patient and family and they are aware a PAS-RR has been submitted.  SW reviewed with patient and family that they may be contacted for a follow up appointment within 10 days of hospital discharge if their SNF stay is < 30 days.  Contact information for [facility] Line was also provided.    A: Patient and family verbalized understanding.    P: Further questions may be directed to [facility] LinkAge Line at #[phone], option #4 for PAS-RR staff.          JERE West

## 2023-01-28 ENCOUNTER — TELEPHONE (OUTPATIENT)
Dept: GERIATRICS | Facility: CLINIC | Age: 83
End: 2023-01-28
Payer: COMMERCIAL

## 2023-01-28 NOTE — TELEPHONE ENCOUNTER
"Resident's lunch time Blood sugar was \"HI\" and so staff called.    This morning they spoke with the other NP on call and reading was \"HI\".  Was given 12 units of sliding scale insulin.    For lunch asked to give 16 units.  Nurse wanted to check sugar in two hours after which was fine.  Call back if reading is still \"HI\"    Ana Matthew, OMAR CNP    "

## 2023-01-28 NOTE — TELEPHONE ENCOUNTER
Tc Garcia is a 82 year old  (1940), Nurse called today to report: clarify orders for AC and ASA use    Per NGSY note, advised no ASA and clarification for ASPIRE trial or restart AC, TBD f/u appt     Electronically signed by:   Charlie Byers NP

## 2023-01-28 NOTE — TELEPHONE ENCOUNTER
New admit as of this afternoon and came with two orders of Avapro.  150mg and 300mg at bed time but nursing wondering which one to give.      Called back the nurse again once finding documentation in his hospital stay.    They talked about his B/P urgency and medication changes.  In the end, Avapro restarted and back to his 450mg at bedtime  Dose like PTA.      Asked nursing to ask Tc if he remembers what dose he took at home.  Could hear his answer on the phone and he said close to 500mg  He was taking.      Order on admission does not say for the two to be given together to give 450mg at HS.  Goal of B/P's <130/80.    Orders:  Give Avapro 450mg at HS.    Electronically signed by Ana Matthew RN, CNP

## 2023-01-29 ENCOUNTER — HOSPITAL ENCOUNTER (INPATIENT)
Facility: CLINIC | Age: 83
LOS: 2 days | Discharge: NURSING, REHAB OR LONG-TERM CARE | DRG: 637 | End: 2023-01-31
Attending: EMERGENCY MEDICINE | Admitting: INTERNAL MEDICINE
Payer: COMMERCIAL

## 2023-01-29 ENCOUNTER — APPOINTMENT (OUTPATIENT)
Dept: GENERAL RADIOLOGY | Facility: CLINIC | Age: 83
DRG: 637 | End: 2023-01-29
Attending: EMERGENCY MEDICINE
Payer: COMMERCIAL

## 2023-01-29 ENCOUNTER — APPOINTMENT (OUTPATIENT)
Dept: SPEECH THERAPY | Facility: CLINIC | Age: 83
DRG: 637 | End: 2023-01-29
Attending: INTERNAL MEDICINE
Payer: COMMERCIAL

## 2023-01-29 DIAGNOSIS — Z79.4 TYPE 2 DIABETES MELLITUS WITH OTHER NEUROLOGIC COMPLICATION, WITH LONG-TERM CURRENT USE OF INSULIN (H): ICD-10-CM

## 2023-01-29 DIAGNOSIS — Z66 DNR (DO NOT RESUSCITATE): ICD-10-CM

## 2023-01-29 DIAGNOSIS — E86.0 DEHYDRATION: ICD-10-CM

## 2023-01-29 DIAGNOSIS — I10 ESSENTIAL HYPERTENSION: ICD-10-CM

## 2023-01-29 DIAGNOSIS — E11.49 TYPE 2 DIABETES MELLITUS WITH OTHER NEUROLOGIC COMPLICATION, WITH LONG-TERM CURRENT USE OF INSULIN (H): ICD-10-CM

## 2023-01-29 DIAGNOSIS — I50.32 CHRONIC DIASTOLIC HEART FAILURE (H): ICD-10-CM

## 2023-01-29 DIAGNOSIS — I95.9 HYPOTENSION, UNSPECIFIED HYPOTENSION TYPE: ICD-10-CM

## 2023-01-29 DIAGNOSIS — I50.32 CHRONIC HEART FAILURE WITH PRESERVED EJECTION FRACTION (HFPEF) (H): ICD-10-CM

## 2023-01-29 DIAGNOSIS — E16.2 HYPOGLYCEMIA: ICD-10-CM

## 2023-01-29 DIAGNOSIS — R09.02 HYPOXIA: ICD-10-CM

## 2023-01-29 DIAGNOSIS — I50.33 ACUTE ON CHRONIC HEART FAILURE WITH PRESERVED EJECTION FRACTION (HFPEF) (H): ICD-10-CM

## 2023-01-29 LAB
ALBUMIN UR-MCNC: NEGATIVE MG/DL
ANION GAP SERPL CALCULATED.3IONS-SCNC: 9 MMOL/L (ref 7–15)
APPEARANCE UR: CLEAR
BASOPHILS # BLD AUTO: 0.1 10E3/UL (ref 0–0.2)
BASOPHILS NFR BLD AUTO: 1 %
BILIRUB UR QL STRIP: NEGATIVE
BUN SERPL-MCNC: 29.4 MG/DL (ref 8–23)
CALCIUM SERPL-MCNC: 9.6 MG/DL (ref 8.8–10.2)
CHLORIDE SERPL-SCNC: 96 MMOL/L (ref 98–107)
COLOR UR AUTO: YELLOW
CREAT SERPL-MCNC: 1.68 MG/DL (ref 0.67–1.17)
DEPRECATED HCO3 PLAS-SCNC: 31 MMOL/L (ref 22–29)
EOSINOPHIL # BLD AUTO: 0.2 10E3/UL (ref 0–0.7)
EOSINOPHIL NFR BLD AUTO: 2 %
ERYTHROCYTE [DISTWIDTH] IN BLOOD BY AUTOMATED COUNT: 14.5 % (ref 10–15)
GFR SERPL CREATININE-BSD FRML MDRD: 40 ML/MIN/1.73M2
GLUCOSE BLDC GLUCOMTR-MCNC: 102 MG/DL (ref 70–99)
GLUCOSE BLDC GLUCOMTR-MCNC: 108 MG/DL (ref 70–99)
GLUCOSE BLDC GLUCOMTR-MCNC: 110 MG/DL (ref 70–99)
GLUCOSE BLDC GLUCOMTR-MCNC: 122 MG/DL (ref 70–99)
GLUCOSE BLDC GLUCOMTR-MCNC: 134 MG/DL (ref 70–99)
GLUCOSE BLDC GLUCOMTR-MCNC: 146 MG/DL (ref 70–99)
GLUCOSE BLDC GLUCOMTR-MCNC: 177 MG/DL (ref 70–99)
GLUCOSE BLDC GLUCOMTR-MCNC: 253 MG/DL (ref 70–99)
GLUCOSE BLDC GLUCOMTR-MCNC: 258 MG/DL (ref 70–99)
GLUCOSE BLDC GLUCOMTR-MCNC: 274 MG/DL (ref 70–99)
GLUCOSE BLDC GLUCOMTR-MCNC: 543 MG/DL (ref 70–99)
GLUCOSE SERPL-MCNC: 135 MG/DL (ref 70–99)
GLUCOSE UR STRIP-MCNC: 500 MG/DL
HCO3 BLDV-SCNC: 31 MMOL/L (ref 21–28)
HCT VFR BLD AUTO: 43.8 % (ref 40–53)
HGB BLD-MCNC: 14.4 G/DL (ref 13.3–17.7)
HGB UR QL STRIP: NEGATIVE
HOLD SPECIMEN: NORMAL
HOLD SPECIMEN: NORMAL
HYALINE CASTS: 13 /LPF
IMM GRANULOCYTES # BLD: 0.1 10E3/UL
IMM GRANULOCYTES NFR BLD: 1 %
KETONES UR STRIP-MCNC: 10 MG/DL
LACTATE BLD-SCNC: 0.9 MMOL/L
LEUKOCYTE ESTERASE UR QL STRIP: NEGATIVE
LYMPHOCYTES # BLD AUTO: 1 10E3/UL (ref 0.8–5.3)
LYMPHOCYTES NFR BLD AUTO: 10 %
MCH RBC QN AUTO: 27.7 PG (ref 26.5–33)
MCHC RBC AUTO-ENTMCNC: 32.9 G/DL (ref 31.5–36.5)
MCV RBC AUTO: 84 FL (ref 78–100)
MONOCYTES # BLD AUTO: 0.8 10E3/UL (ref 0–1.3)
MONOCYTES NFR BLD AUTO: 8 %
MUCOUS THREADS #/AREA URNS LPF: PRESENT /LPF
NEUTROPHILS # BLD AUTO: 7.9 10E3/UL (ref 1.6–8.3)
NEUTROPHILS NFR BLD AUTO: 78 %
NITRATE UR QL: NEGATIVE
NRBC # BLD AUTO: 0 10E3/UL
NRBC BLD AUTO-RTO: 0 /100
PCO2 BLDV: 58 MM HG (ref 40–50)
PH BLDV: 7.34 [PH] (ref 7.32–7.43)
PH UR STRIP: 5 [PH] (ref 5–7)
PLATELET # BLD AUTO: 264 10E3/UL (ref 150–450)
PO2 BLDV: 42 MM HG (ref 25–47)
POTASSIUM SERPL-SCNC: 3.5 MMOL/L (ref 3.4–5.3)
RBC # BLD AUTO: 5.2 10E6/UL (ref 4.4–5.9)
RBC URINE: 10 /HPF
SAO2 % BLDV: 72 % (ref 94–100)
SARS-COV-2 RNA RESP QL NAA+PROBE: NEGATIVE
SODIUM SERPL-SCNC: 136 MMOL/L (ref 136–145)
SP GR UR STRIP: 1.02 (ref 1–1.03)
SQUAMOUS EPITHELIAL: <1 /HPF
UROBILINOGEN UR STRIP-MCNC: NORMAL MG/DL
WBC # BLD AUTO: 9.9 10E3/UL (ref 4–11)
WBC URINE: 2 /HPF

## 2023-01-29 PROCEDURE — 250N000012 HC RX MED GY IP 250 OP 636 PS 637: Performed by: INTERNAL MEDICINE

## 2023-01-29 PROCEDURE — 120N000001 HC R&B MED SURG/OB

## 2023-01-29 PROCEDURE — 87086 URINE CULTURE/COLONY COUNT: CPT | Performed by: EMERGENCY MEDICINE

## 2023-01-29 PROCEDURE — 258N000003 HC RX IP 258 OP 636: Performed by: EMERGENCY MEDICINE

## 2023-01-29 PROCEDURE — 92610 EVALUATE SWALLOWING FUNCTION: CPT | Mod: GN | Performed by: SPEECH-LANGUAGE PATHOLOGIST

## 2023-01-29 PROCEDURE — 81003 URINALYSIS AUTO W/O SCOPE: CPT | Performed by: EMERGENCY MEDICINE

## 2023-01-29 PROCEDURE — 82803 BLOOD GASES ANY COMBINATION: CPT

## 2023-01-29 PROCEDURE — 82962 GLUCOSE BLOOD TEST: CPT

## 2023-01-29 PROCEDURE — 96361 HYDRATE IV INFUSION ADD-ON: CPT

## 2023-01-29 PROCEDURE — U0005 INFEC AGEN DETEC AMPLI PROBE: HCPCS | Performed by: INTERNAL MEDICINE

## 2023-01-29 PROCEDURE — 36415 COLL VENOUS BLD VENIPUNCTURE: CPT | Performed by: EMERGENCY MEDICINE

## 2023-01-29 PROCEDURE — 80048 BASIC METABOLIC PNL TOTAL CA: CPT | Performed by: EMERGENCY MEDICINE

## 2023-01-29 PROCEDURE — 258N000003 HC RX IP 258 OP 636: Performed by: INTERNAL MEDICINE

## 2023-01-29 PROCEDURE — 87040 BLOOD CULTURE FOR BACTERIA: CPT | Performed by: EMERGENCY MEDICINE

## 2023-01-29 PROCEDURE — 99285 EMERGENCY DEPT VISIT HI MDM: CPT | Mod: 25

## 2023-01-29 PROCEDURE — 85025 COMPLETE CBC W/AUTO DIFF WBC: CPT | Performed by: EMERGENCY MEDICINE

## 2023-01-29 PROCEDURE — 96360 HYDRATION IV INFUSION INIT: CPT

## 2023-01-29 PROCEDURE — 92526 ORAL FUNCTION THERAPY: CPT | Mod: GN | Performed by: SPEECH-LANGUAGE PATHOLOGIST

## 2023-01-29 PROCEDURE — C9803 HOPD COVID-19 SPEC COLLECT: HCPCS

## 2023-01-29 PROCEDURE — 99223 1ST HOSP IP/OBS HIGH 75: CPT | Mod: AI | Performed by: INTERNAL MEDICINE

## 2023-01-29 PROCEDURE — 71045 X-RAY EXAM CHEST 1 VIEW: CPT

## 2023-01-29 RX ORDER — ACETAMINOPHEN 650 MG/1
650 SUPPOSITORY RECTAL EVERY 6 HOURS PRN
Status: DISCONTINUED | OUTPATIENT
Start: 2023-01-29 | End: 2023-01-31 | Stop reason: HOSPADM

## 2023-01-29 RX ORDER — ACETAMINOPHEN 325 MG/1
650 TABLET ORAL EVERY 6 HOURS PRN
Status: DISCONTINUED | OUTPATIENT
Start: 2023-01-29 | End: 2023-01-31 | Stop reason: HOSPADM

## 2023-01-29 RX ORDER — DEXTROSE MONOHYDRATE 100 MG/ML
INJECTION, SOLUTION INTRAVENOUS CONTINUOUS PRN
Status: DISCONTINUED | OUTPATIENT
Start: 2023-01-29 | End: 2023-01-30

## 2023-01-29 RX ORDER — NICOTINE POLACRILEX 4 MG
15-30 LOZENGE BUCCAL
Status: DISCONTINUED | OUTPATIENT
Start: 2023-01-29 | End: 2023-01-29

## 2023-01-29 RX ORDER — NICOTINE POLACRILEX 4 MG
15-30 LOZENGE BUCCAL
Status: DISCONTINUED | OUTPATIENT
Start: 2023-01-29 | End: 2023-01-30

## 2023-01-29 RX ORDER — DEXTROSE MONOHYDRATE 25 G/50ML
25-50 INJECTION, SOLUTION INTRAVENOUS
Status: DISCONTINUED | OUTPATIENT
Start: 2023-01-29 | End: 2023-01-30

## 2023-01-29 RX ORDER — ONDANSETRON 2 MG/ML
4 INJECTION INTRAMUSCULAR; INTRAVENOUS EVERY 6 HOURS PRN
Status: DISCONTINUED | OUTPATIENT
Start: 2023-01-29 | End: 2023-01-31 | Stop reason: HOSPADM

## 2023-01-29 RX ORDER — SODIUM CHLORIDE 9 MG/ML
INJECTION, SOLUTION INTRAVENOUS CONTINUOUS
Status: DISCONTINUED | OUTPATIENT
Start: 2023-01-29 | End: 2023-01-30

## 2023-01-29 RX ORDER — SODIUM CHLORIDE 9 MG/ML
INJECTION, SOLUTION INTRAVENOUS CONTINUOUS
Status: DISCONTINUED | OUTPATIENT
Start: 2023-01-29 | End: 2023-01-29

## 2023-01-29 RX ORDER — LIDOCAINE 40 MG/G
CREAM TOPICAL
Status: DISCONTINUED | OUTPATIENT
Start: 2023-01-29 | End: 2023-01-29

## 2023-01-29 RX ORDER — ACETAMINOPHEN 325 MG/1
650 TABLET ORAL EVERY 4 HOURS PRN
Status: ON HOLD | COMMUNITY
End: 2023-04-27

## 2023-01-29 RX ORDER — ONDANSETRON 4 MG/1
4 TABLET, ORALLY DISINTEGRATING ORAL EVERY 6 HOURS PRN
Status: DISCONTINUED | OUTPATIENT
Start: 2023-01-29 | End: 2023-01-31 | Stop reason: HOSPADM

## 2023-01-29 RX ORDER — DEXTROSE MONOHYDRATE 25 G/50ML
25-50 INJECTION, SOLUTION INTRAVENOUS
Status: DISCONTINUED | OUTPATIENT
Start: 2023-01-29 | End: 2023-01-29

## 2023-01-29 RX ORDER — LIDOCAINE 40 MG/G
CREAM TOPICAL
Status: DISCONTINUED | OUTPATIENT
Start: 2023-01-29 | End: 2023-01-30

## 2023-01-29 RX ADMIN — SODIUM CHLORIDE 3 UNITS/HR: 9 INJECTION, SOLUTION INTRAVENOUS at 17:05

## 2023-01-29 RX ADMIN — SODIUM CHLORIDE 500 ML: 9 INJECTION, SOLUTION INTRAVENOUS at 06:53

## 2023-01-29 RX ADMIN — SODIUM CHLORIDE, PRESERVATIVE FREE: 5 INJECTION INTRAVENOUS at 23:57

## 2023-01-29 RX ADMIN — SODIUM CHLORIDE, PRESERVATIVE FREE: 5 INJECTION INTRAVENOUS at 17:10

## 2023-01-29 RX ADMIN — SODIUM CHLORIDE 3 UNITS/HR: 9 INJECTION, SOLUTION INTRAVENOUS at 15:54

## 2023-01-29 RX ADMIN — SODIUM CHLORIDE: 9 INJECTION, SOLUTION INTRAVENOUS at 16:00

## 2023-01-29 ASSESSMENT — ACTIVITIES OF DAILY LIVING (ADL)
WALKING_OR_CLIMBING_STAIRS_DIFFICULTY: NO
TOILETING_ISSUES: YES
TOILETING: 2-->COMPLETELY DEPENDENT
CONCENTRATING,_REMEMBERING_OR_MAKING_DECISIONS_DIFFICULTY: NO
TOILETING_ASSISTANCE: TOILETING DIFFICULTY, REQUIRES EQUIPMENT
WEAR_GLASSES_OR_BLIND: YES
CHANGE_IN_FUNCTIONAL_STATUS_SINCE_ONSET_OF_CURRENT_ILLNESS/INJURY: NO
ADLS_ACUITY_SCORE: 35
TOILETING: 2-->COMPLETELY DEPENDENT (NOT DEVELOPMENTALLY APPROPRIATE)
ADLS_ACUITY_SCORE: 35
FALL_HISTORY_WITHIN_LAST_SIX_MONTHS: YES
DIFFICULTY_EATING/SWALLOWING: NO
DOING_ERRANDS_INDEPENDENTLY_DIFFICULTY: YES
VISION_MANAGEMENT: GLASSES AT BEDSIDE
ADLS_ACUITY_SCORE: 35
ADLS_ACUITY_SCORE: 28
DRESSING/BATHING_DIFFICULTY: NO
ADLS_ACUITY_SCORE: 28
ADLS_ACUITY_SCORE: 35
ADLS_ACUITY_SCORE: 28
ADLS_ACUITY_SCORE: 35
EQUIPMENT_CURRENTLY_USED_AT_HOME: WALKER, STANDARD
ADLS_ACUITY_SCORE: 35
NUMBER_OF_TIMES_PATIENT_HAS_FALLEN_WITHIN_LAST_SIX_MONTHS: 3

## 2023-01-29 NOTE — PHARMACY-ADMISSION MEDICATION HISTORY
Pharmacy Medication History  Admission medication history interview status for the 1/29/2023  admission is complete. See EPIC admission navigator for prior to admission medications     Location of Interview: Outside patient room but on unit  Medication history sources: ALEJANDRA Carl ([facility]) and Care Everywhere    Significant changes made to the medication list:  Changed:  - Lispro sliding scale directions    In the past week, patient estimated taking medication this percent of the time: greater than 90%    Medication Affordability: None       Additional medication history information:   None     Medication reconciliation completed by provider prior to medication history? Yes    Time spent in this activity: 25 minutes    Prior to Admission medications    Medication Sig Last Dose Taking? Auth Provider Long Term End Date   acetaminophen (TYLENOL) 325 MG tablet Take 650 mg by mouth every 4 hours as needed for mild pain or fever 1/28/2023 at 2100 Yes Unknown, Entered By History No    amLODIPine (NORVASC) 10 MG tablet Take 1 tablet (10 mg) by mouth daily 1/28/2023 at AM Yes Radha Fu MD Yes    carvedilol (COREG) 12.5 MG tablet Take 1 tablet (12.5 mg) by mouth 2 times daily (with meals) 1/28/2023 at PM Yes Radha Fu MD Yes    furosemide (LASIX) 40 MG tablet Take 1 1/2 tablets(60mg) by mouth every day 1/28/2023 at AM Yes Cony Julien DO Yes    glucagon 1 MG kit 1 mg as needed for low blood sugar  at prn Yes Unknown, Entered By History     insulin glargine (LANTUS PEN) 100 UNIT/ML pen Inject 15 Units Subcutaneous At Bedtime 1/28/2023 at HS Yes Radha Fu MD Yes    insulin lispro (HUMALOG) 100 UNIT/ML Cartridge Inject 1-10 Units Subcutaneous 3 times daily (before meals)  Patient taking differently: Inject 1-10 Units Subcutaneous 3 times daily (before meals) Inject as per sliding scale  If 120-150 = 1 unit  151-200 = 2 units  201-250 = 4 units  251-300 = 8  units  301-350 = 10 units  351-400 = 12 units  401-450 = 14 units  451-500 = 16 units  501+ call MD 1/28/2023 at 12 units in PM Yes Radha Fu MD Yes    irbesartan (AVAPRO) 150 MG tablet Take 1 tablet (150 mg) by mouth At Bedtime 1/28/2023 at 2200 Yes Cony Julien DO Yes    irbesartan (AVAPRO) 300 MG tablet Take 1 tablet (300 mg) by mouth At Bedtime 1/28/2023 at HS Yes Radha Fu MD Yes    tamsulosin (FLOMAX) 0.4 MG capsule Take 1 capsule (0.4 mg) by mouth daily 1/28/2023 at AM Yes Radha Fu MD         The information provided in this note is only as accurate as the sources available at the time of update(s)

## 2023-01-29 NOTE — ED NOTES
Bed: ED07  Expected date:   Expected time:   Means of arrival:   Comments:  512 82m Hypoglycemia, medication change, glucagon given

## 2023-01-29 NOTE — PROGRESS NOTES
"  SLP Bedside Swallow Evaluation  01/29/23 4136   Appointment Info   Signing Clinician's Name / Credentials (SLP) Delia Domínguez MA [facility] SLP   General Information   Onset of Illness/Injury or Date of Surgery 01/29/23   Referring Physician Dr. Manuel   Patient/Family Therapy Goal Statement (SLP) Pt wants smooth to minced and moist food due to no upper dentures.  Also stated he wants \"scalding hot coffee\".   Pertinent History of Current Problem Per MD note: Tc Garcia, 82 year old male with PMH of stage III chronic kidney disease, anemia, paroxysmal atrial fibrillation on anticoagulation, pulmonary hypertension, essential hypertension, type 2 diabetes mellitus on insulin pump, intraventricular hemorrhage. 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  Symptomatic extreme glucose instability past 24 hours with glu over 600 and under 50,  ABBIE superimposed on CKD due to dehydration  Initial hypovolemic shock/hypotension from dehydration, Chronic memory loss, recent exacerbation with confusion with neuro issues last admit.  His family present report historically he has erratic eating and frequently prefers candy/sugar items.  Patient denies pain, sob, nausea.  He doesn't remember much of yesterday.      SLP Bedside swallow eval completed during previous admit, IDDSI Diet Level 5 and thin liquids recommended with reflux precautions as question of esophageal dysphagia documented by SLP   General Observations Pleasant and cooperative.  Today's SLP evaluation was completed despite pt passing the RN swallow screen given recent mild oral-pharyngeal dysphagia " identified during previous admission.   Type of Evaluation   Type of Evaluation Swallow Evaluation   Oral Motor   Oral Musculature generally intact   Dentition (Oral Motor)   Dentition (Oral Motor) significant number of missing teeth  (upper dentures were lost during last admission)   Cough/Swallow/Gag Reflex (Oral Motor)   Comment, Cough/Swallow/Gag Reflex (Oral Motor) WFL   Vocal Quality/Secretion Management (Oral Motor)   Comment, Vocal Quality/Secretion Management (Oral Motor) WFL   General Swallowing Observations   Current Diet/Method of Nutritional Intake (General Swallowing Observations, NIS) pureed (level 4);thin liquids (level 0)   Swallowing Evaluation Clinical swallow evaluation   Clinical Swallow Eval: Thin Liquid Texture Trial   Mode of Presentation, Thin Liquids spoon;cup   Volume of Liquid or Food Presented sips x 6 by cup, ice chips x 2   Oral Phase of Swallow premature pharyngeal entry   Pharyngeal Phase of Swallow reduction in laryngeal movement;repeated swallows  (swallow delay may be present)   Diagnostic Statement No overt signs of aspiration   Clinical Swallow Evaluation: Puree Solid Texture Trial   Mode of Presentation, Puree spoon   Volume of Puree Presented tsps x 5   Oral Phase, Puree WFL   Pharyngeal Phase, Puree reduction in laryngeal movement;repeated swallows  (swallow delay may be present)   Diagnostic Statement No overt signs of aspiration   Esophageal Phase of Swallow   Patient reports or presents with symptoms of esophageal dysphagia Yes   Esophageal comments History of GERD; question of esophageal dysphagia during previous admit   Swallowing Recommendations   Diet Consistency Recommendations pureed (level 4);thin liquids (level 0)   Supervision Level for Intake distant supervision needed   Mode of Delivery Recommendations bolus size, small;slow rate of intake   Swallowing Maneuver Recommendations alternate food and liquid intake;extra swallow   Recommended Feeding/Eating Techniques  (Swallow Eval) maintain upright sitting position for eating  (stay up for 1-2 hours)   Medication Administration Recommendations, Swallowing (SLP) With thin liquids as tolerated   Instrumental Assessment Recommendations   (to be determined)   Comment, Swallowing Recommendations Hold po if respiratory status declines/increased aspiration signs are noted   Clinical Impression   Criteria for Skilled Therapeutic Interventions Met (SLP Eval) Yes, treatment indicated   SLP Diagnosis Mild oral-pharyngeal dysphagia; hx of GERD   Risks & Benefits of therapy have been explained evaluation/treatment results reviewed;care plan/treatment goals reviewed;risks/benefits reviewed;current/potential barriers reviewed;participants voiced agreement with care plan;participants included;patient   Clinical Impression Comments Pt presents with mild oral-pharyngeal dysphagia at bedside.  Deficits appear similar to findings during previous admit.  Lack of upper teeth, need for multiple swallows, and decreased laryngeal elevation were observed during today's evaluation.  No overt aspiration signs were noted.  Recommend a continued puree IDDSI Diet Level 4 and thin liquids with swallow precautions/strategies.  Hold po if respiratory status declines/increased aspiration signs are noted.  Monitor for increased esophageal dysphagia symptoms and keep pt upright for 1-2 hours after eating.  Plan to continue short term SLP swallow Tx to assess diet tolerance, train strategies, and trial minced and moist textures as indicated.   Swallowing Dysfunction &/or Oral Function for Feeding   Treatment of Swallowing Dysfunction &/or Oral Function for Feeding Minutes (24683) 10   Symptoms Noted During/After Treatment None   Treatment Detail/Skilled Intervention Swallow: Educated pt and RN regarding recommendation to continue current puree diet and thin liquids with precautions/strategies.  Both verbalized understanding/agreement.  Pt was able to take 1-2  additional ounces of pudding and 2 sips of thin liquids in Tx without aspiration signs given min-mod cues for strategies.   SLP Discharge Planning   SLP Plan Assess diet tolerance, train strategies, trial minced and moist textures   SLP Discharge Recommendation Transitional Care Facility   SLP Rationale for DC Rec Swallow function is below his baseline, funciton may be close to recent findings during last admission; short term SLP swallow Tx recommended to assess diet tolerance/safety for upgrades   SLP Brief overview of current status  Mild oral-pharyngeal dysphagia; Rec: continue a puree diet and thin liquids with precautions/strategies   Total Session Time   Total Session Time (sum of timed and untimed services) 10

## 2023-01-29 NOTE — ED PROVIDER NOTES
History     Chief Complaint:  Hypoglycemia       HPI   Tc Garcia is a 82 year old male with a history of chronic atrial fibrillation, gastroesophageal reflux, chronic kidney disease, history of falls, ventricular tachycardia, diabetes mellitus, amongst many other past medical problems presents to the emergency department with an episode of hypoglycemia.  The patient reportedly has had labile blood sugars and several hyperglycemic readings yesterday.  The patient has been titrated with a sliding scale of insulin and was noted this morning to be poorly responsive with a very low blood sugar.  Dextrose was administered and the patient's blood sugar improved.  Patient was also noted to have slightly low blood pressure.  On arrival the patient cannot provide substantial history but he notes that he is doing fine.  Patient is DNR/DNI.    Review of the outside nursing home records indicates that at approximately 8 AM they received a high reading from the glucose monitor.  They charted a glucose of 600.  They gave 12 units of Humalog and they were instructed to recheck the blood sugar in 2 hours.  That dose was given at 836.  The blood sugar was retaken at 1045 and was still reading high so the on-call provider was contacted and gave orders for an additional 16 units of NovoLog.  Another entry is noted at approximately 1215 where the blood sugar was now reading 490 at noon the on-call provider updated the sliding scale for NovoLog and the nurse administered 16 units of NovoLog.  There is additional charting noted last evening around 11:00 noting that the patient's blood pressures were a bit on the low side.  Instructions were made to give only the Avapro 150 mg last evening and continue to monitoring the blood pressure and report any changes.    Of note, upon review of the electronic medical record, the patient was just in the hospital earlier this week for a right sided intraventricular hemorrhage.  The patient had  been on apixaban prior to the admission.    Independent Historian: Extensive review of nursing home records and the electronic record were required.      Review of External Notes: Nursing home documentation, approximately 30 pages    ROS:  Review of Systems  The patient's history is somewhat limited.  He has no specific complaints today.    Allergies:  No Known Drug Allergies     Medications:    amLODIPine (NORVASC) 10 MG tablet  carvedilol (COREG) 12.5 MG tablet  furosemide (LASIX) 40 MG tablet  glucagon 1 MG kit  insulin aspart (NOVOLOG PEN) 100 UNIT/ML pen  insulin glargine (LANTUS PEN) 100 UNIT/ML pen  insulin lispro (HUMALOG) 100 UNIT/ML Cartridge  irbesartan (AVAPRO) 150 MG tablet  irbesartan (AVAPRO) 300 MG tablet  nystatin (MYCOSTATIN) 317748 UNIT/GM external cream  tamsulosin (FLOMAX) 0.4 MG capsule        Past Medical History:    Past Medical History:   Diagnosis Date     Anemia      Anemia of chronic disease 5/14/2020     Back pain      CKD (chronic kidney disease) stage 3, GFR 30-59 ml/min (H)      CRF (chronic renal failure), stage 3  5/14/2020     Fall 11/2019     Mixed hyperlipidemia 7/7/2004     Paroxysmal atrial fibrillation (H)      Personal history of colonic polyps 1972     Pleural effusion on left 11/2019     Pulmonary hypertension (H) 5/10/2020     Recurrent Left Pleural effusion -- S/P Pleurex Cath 5/12/20 12/30/2019     Rosacea 1989     Type II or unspecified type diabetes mellitus without mention of complication, not stated as uncontrolled 1999     Unspecified essential hypertension 1994       Past Surgical History:    Past Surgical History:   Procedure Laterality Date     ANESTHESIA CARDIOVERSION N/A 2/3/2020    Procedure: ANESTHESIA, FOR CARDIOVERSION;  Surgeon: GENERIC ANESTHESIA PROVIDER;  Location:  OR      RIGHT HEART CATH MEASUREMENTS RECORDED N/A 5/13/2020    Procedure: Right Heart Cath;  Surgeon: Senthil Silva MD;  Location:  HEART CARDIAC CATH LAB     [facility]  TONSILS/ADENOIDS,<13 Y/O      T & A <11y.o.     IR CHEST TUBE DRAIN TUNNELED LEFT  5/12/2020     IR CHEST TUBE PLACEMENT NON-TUNNELLED LEFT  7/11/2020     IR CHEST TUBE REMOVAL NON TUNNELED LEFT  7/17/2020     IR CHEST TUBE REMOVAL TUNNELED LEFT  7/11/2020     LAPAROSCOPIC CHOLECYSTECTOMY N/A 11/22/2020    Procedure: CHOLECYSTECTOMY, LAPAROSCOPIC;  Surgeon: Annette Lambert MD;  Location:  OR     LAPAROSCOPIC HERNIORRHAPHY INGUINAL BILATERAL Bilateral 10/27/2020    Procedure: LAPAROSCOPIC BILATERAL INGUINAL HERNIA REPAIR WITH MESH;  Surgeon: Brian Garsia MD;  Location:  OR        Family History:    family history includes Diabetes in his brother, brother, brother, brother, sister, sister, and sister; Family History Negative in his brother, brother, father, mother, sister, sister, and sister; Heart Disease in his sister.    Social History:   reports that he quit smoking about 15 years ago. His smoking use included cigarettes. He started smoking about 55 years ago. He has a 8.00 pack-year smoking history. He has never used smokeless tobacco. He reports that he does not currently use alcohol after a past usage of about 35.0 standard drinks per week. He reports that he does not currently use drugs.  PCP: Jessika Cordoba     Physical Exam     Patient Vitals for the past 24 hrs:   BP Temp Temp src Pulse Resp SpO2   01/29/23 0709 94/63 -- -- 55 -- 99 %   01/29/23 0700 (!) 85/58 -- -- 65 17 100 %   01/29/23 0655 102/57 -- -- 64 (!) 9 100 %   01/29/23 0654 -- -- -- 63 11 98 %   01/29/23 0653 -- -- -- 73 10 (!) 73 %   01/29/23 0652 -- -- -- 56 15 (!) 87 %   01/29/23 0650 -- -- -- 60 (!) 7 95 %   01/29/23 0645 (!) 89/59 -- -- 59 13 97 %   01/29/23 0640 -- (!) 96.6  F (35.9  C) Temporal -- -- --   01/29/23 0630 102/70 -- -- 66 -- --   01/29/23 0627 103/69 -- -- 77 20 100 %   01/29/23 0624 103/69 -- -- 66 -- 100 %   01/29/23 0622 99/65 -- -- 61 -- --        Physical Exam  General: Resting comfortably on  the [facility].  Patient is sleeping.  He does open his eyes to loud voice.  Head:  The scalp, face, and head appear normal  Eyes:  The pupils are equal, round, and reactive to light    There is no nystagmus    Extraocular muscles are intact    Conjunctivae and sclerae are normal  ENT:    The nose is normal    Pinnae are normal    The oropharynx is normal    Uvula is in the midline  Neck:  Normal range of motion    There is no rigidity noted    There is no midline cervical spine pain/tenderness    Trachea is in the midline    No mass is detected  CV:  Irregular rhythm consistent with atrial fibrillation with slow ventricular response    Normal S1/S2, no S3    No pathological murmur detected  Resp:  Lungs are clear    Breathing is very shallow    There is no tachypnea    Non-labored    No rales    No wheezing   GI:  Abdomen is soft, there is no rigidity    No distension    No tympani    No rebound tenderness     Non-surgical without peritoneal features  :  Normal male external genitalia.  Uncircumcised.  MS:  Normal muscular tone    No major joint effusions    No asymmetric leg swelling, no calf tenderness  Skin:  No rash or acute skin lesions noted  Neuro: The patient answers in one-word sentences.  He generally says yes or no.  He then falls back asleep.  Lymph: No anterior cervical lymphadenopathy noted              Emergency Department Course   Imaging:  XR Chest Port 1 View   Final Result   IMPRESSION: Stable small left pleural fluid. Stable streaky opacities at the left lung base. No new airspace disease identified. Multiple subacute bilateral rib fractures are stable. Normal cardiac silhouette.            Report per radiology    Laboratory:  Labs Ordered and Resulted from Time of ED Arrival to Time of ED Departure   BASIC METABOLIC PANEL - Abnormal       Result Value    Sodium 136      Potassium 3.5      Chloride 96 (*)     Carbon Dioxide (CO2) 31 (*)     Anion Gap 9      Urea Nitrogen 29.4 (*)     Creatinine  1.68 (*)     Calcium 9.6      Glucose 135 (*)     GFR Estimate 40 (*)    GLUCOSE BY METER - Abnormal    GLUCOSE BY METER POCT 122 (*)    GLUCOSE BY METER - Abnormal    GLUCOSE BY METER POCT 177 (*)    GLUCOSE BY METER - Abnormal    GLUCOSE BY METER POCT 146 (*)    ISTAT GASES LACTATE VENOUS POCT - Abnormal    Lactic Acid POCT 0.9      Bicarbonate Venous POCT 31 (*)     O2 Sat, Venous POCT 72 (*)     pCO2V Venous POCT 58 (*)     pH Venous POCT 7.34      pO2 Venous POCT 42     CBC WITH PLATELETS AND DIFFERENTIAL    WBC Count 9.9      RBC Count 5.20      Hemoglobin 14.4      Hematocrit 43.8      MCV 84      MCH 27.7      MCHC 32.9      RDW 14.5      Platelet Count 264      % Neutrophils 78      % Lymphocytes 10      % Monocytes 8      % Eosinophils 2      % Basophils 1      % Immature Granulocytes 1      NRBCs per 100 WBC 0      Absolute Neutrophils 7.9      Absolute Lymphocytes 1.0      Absolute Monocytes 0.8      Absolute Eosinophils 0.2      Absolute Basophils 0.1      Absolute Immature Granulocytes 0.1      Absolute NRBCs 0.0     ROUTINE UA WITH MICROSCOPIC REFLEX TO CULTURE   BLOOD CULTURE   BLOOD CULTURE   URINE CULTURE        Procedures       Emergency Department Course & Assessments:           Interventions:  Medications   0.9% sodium chloride BOLUS ( Intravenous Restarted 1/29/23 0708)   0.9% sodium chloride BOLUS (0 mLs Intravenous Stopped 1/29/23 0708)        Assessments:    The patient was assessed multiple times well in the emergency department.    Disposition:  The patient was admitted to the hospital under the care of Dr. Manuel.     Impression & Plan      Medical Decision Making:  This patient presents to the emergency department with a episode of hypoglycemia.  It was noted that the patient's blood sugars were extensively elevated yesterday above 600 and he received multiple doses of subcutaneous rapid acting insulin.  This morning the patient was found to be poorly responsive and hypoglycemic.  He  was also noted to have decreasing blood pressure late last evening and into this morning.  Sent over to the emergency department for hypoglycemia and hypotension.  On arrival the patient was still somewhat somnolent.  When the patient falls asleep he does become hypoxic likely from sleep apnea and mild airway obstruction from his tongue.  When the patient is awakened and starts talking his oxygen saturations are 100% on room air.    Patient was given dextrose prehospital and his blood sugars were rechecked here and were above 100.  Clearly his type 2 diabetes, insulin requiring, is currently quite labile.  The patient's blood pressures are fairly soft at this time.  There is likely significant intravascular volume depletion from hyperglycemic osmotic diuresis.  The patient does show evidence of chronic renal insufficiency and acute renal insufficiency.  Patient's CBC is within normal limits.  His lactate is normal, of note this was obtained after 1 L of crystalloid.  Patient's blood pressures are up to approximately 100 at the time of this dictation after approximately 1.5 L of crystalloid.  Some of the initial blood pressure readings were with a loose blood pressure cuff.  The patient is easily able to be awakened and is responsive to voice.  He has no specific complaints at this time.  Patient will require inpatient hospital admission for glycemic stabilization and rehydration to improve his renal function and his general weakness.  The patient is noted to be DNR/DNI.  The admitting hospitalist is planning to call the patient's daughter who called in to check on the status of the patient.      Diagnosis:    ICD-10-CM    1. Hypoglycemia  E16.2       2. Hypotension, unspecified hypotension type  I95.9       3. Dehydration  E86.0       4. Hypoxia  R09.02  While sleeping      5. DNR (do not resuscitate)  [id number]              Charlie Nance MD  1/29/2023   Charlie Nance MD Rock, Michael P, MD  01/29/23 0918

## 2023-01-29 NOTE — ED NOTES
Patient retaining greater then 700ml on PVR. This was recorded once at 0830 and again at 1130am. Urethral catheter placed for urinary retention.

## 2023-01-29 NOTE — ED NOTES
[facility]  ED Nurse Handoff Report    ED Chief complaint: Hypoglycemia      ED Diagnosis:   Final diagnoses:   Hypoglycemia   Hypotension, unspecified hypotension type   Dehydration   Hypoxia   DNR (do not resuscitate)       Code Status: DNR / DNI    Allergies:   Allergies   Allergen Reactions    No Known Drug Allergies        Patient Story: Patient from NH with labile BG.  Focused Assessment:    Neuro: Alert and oriented   Cards: WDL   Pulm: WDL, room air. Probable undiagnosed sleep apnea     Treatments and/or interventions provided: See MAR   Patient's response to treatments and/or interventions: Improved     To be done/followed up on inpatient unit:      Does this patient have any cognitive concerns?:  none    Activity level - Baseline/Home:  Stand with Assist and Walker  Activity Level - Current:   Stand with Assist and Walker    Patient's Preferred language: English   Needed?: No    Isolation: None  Infection: Not Applicable  Patient tested for COVID 19 prior to admission: YES  Bariatric?: No    Vital Signs:   Vitals:    01/29/23 0800 01/29/23 0900 01/29/23 1000 01/29/23 1100   BP: 105/62 114/73 127/89 (!) 143/106   BP Location:       Pulse: 54 57 70 65   Resp: (!) 7 15 10 18   Temp:       TempSrc:       SpO2: 100% 100% 94% (!) 89%       Cardiac Rhythm:     Was the PSS-3 completed:   Yes  What interventions are required if any?               Family Comments: went home. Call Brittani or wife with questions/issues  OBS brochure/video discussed/provided to patient/family: No              Name of person given brochure if not patient:               Relationship to patient:     For the majority of the shift this patient's behavior was Green.   Behavioral interventions performed were .    ED NURSE PHONE NUMBER: [phone] [phone]

## 2023-01-29 NOTE — H&P
[facility]    History and Physical - Hospitalist Service       Date of Admission:  1/29/2023    Assessment & Plan      Tc Garcia is a 82 year old male admitted on 1/29/2023. He was recently discharged a couple days ago after a hospital stay for intracranial hemorrhage.  He also has long standing DM that has a history of control difficulties.    Symptomatic extreme glucose instability past 24 hours with glu over 600 and under 50  Diabetes Mellitus:  Summary:  No definitive no trigger such as infection or new intracranial event noted thus far.  CT brain appears stable.  No leukocytosis, acute CXR change to point to infection.  COVID and UA pending.  The patients family report he has historically been very challenging to manage from a DM standpoint.  He has tended to not follow a tight DM diet and has frequently eaten candy and soda which he then treated by hitting his insulin pump at the time.  His oral intake also has recently been challenged with some variable appetite and he lost his dentures affecting what he could eat.   Also of note when on pump last stay he ahd a lot of instability with multiple hypoglycemic episodes noted.  -  Glu now 140's.  Monitor with q4 checks and keep NPO until more awake and speech evaluates swallow.  Likely will resume some type of restricted dysphagia diet later today.  When diet resumed depending on glu trend will adjust sliding scale insulin and possibly long acting insulin.  The patient may benefit from meal based insulin depending on his trend and intake into tomorrow.  I expect instability initially and in talking with his family around goals of care it may be best to aim for more a modest control in 200-300 range vs tight control.    ADDENDUM 1345:    Patient clinically has considerably improved with BP trending up and his alertness closer to baseline.  He passed a bedside swallow and I will resume a modified diet closer to what he was on prior.   "Unfortunately even without eating his glu is again becoming uncontrolled and near 500.  Given multiple values over 600 and under 50 in past 24 hours I think it best to place him on an insulin drip for the night.  Once glu stable several hours we can determine how much he is needing and properly transition off the drip.  As I am placing on insulin drip will also change to IMC status.    ABBIE superimposed on CKD due to dehydration  Initial hypovolemic shock/hypotension from dehydration:  -  BP improving after a couple liters of IVF in ED.  Lactic acid on review normal range.  Patient appeared quite dry on presentation, likely exacerbated by excess fluid loss with his 600 glu values yesterday.  Initially on presentation bladder mostly empty on scan also suggestive of dehydration given context.  Will continue IVF at 150 ml/hr during day and likely reduce later today if diet starts.  Hold all anti-hypertensive's/ARB this AM.    Recent spontaneous intracranial hemorrhage while on anticoagulation  Chronic memory loss, recent exacerbation with confusion with neuro issues last admit:  -  CT appears stable with gradual evolution of prior bleed.  No new focal deficits on exam.  Continue to refrain from full anticoagulation for his afib.    Chronic persistent afib:  -  Not on anticoag with recent bleed.  Hold rate control meds this AM with hypotension.    HTN history, recent hypotension:  -  Holding BP meds for now.  Consider resuming beta blocker later today if BP trends up.  Leave off ARB at least one day with recent ABBIE.    Abnormal admission chest x-ray, patchy opacity lung bases\"  Indeterminate chest x-ray on admission last stay.  Appears stable today.   Denies cough, dyspnea.    *Chest CT last stay with nodules that will require outpatient follow-up per protocol, no acute finding suggestive of pneumonia.    Urinary retention last stay:  -  Bladder scan PRN, I asked RN to check one more time in ER after boluses.  Resume " flomax when taking oral/more awake.    Episodes of hypoxia (hypoxic resp failure) in ED when drowsy:  -  Appear only when sleeping but desaturates to 70-80's multiple times in ED.  Improved with O2 and when awake he isn't appear to need the O2.  I suspect he has significant ABDULLAHI, possibly more notable with recent illness.  Monitor on pulse ox here.  May need to consider some O2 at night longer term.  I'm not sure he could tolerate CPAP if needed and he isn't clear he would want it.    Deconditioning:  -  PT/OT         Medical Decision Making       75 MINUTES SPENT BY ME on the date of service doing chart review, history, exam, documentation & further activities per the note.          Diet: NPO for Medical/Clinical Reasons Except for: No Exceptions    DVT Prophylaxis: Pneumatic Compression Devices  Pruett Catheter: Not present  Lines: None     Cardiac Monitoring: None  Code Status: No CPR- Do NOT Intubate  -  Confirmed with daughter Brittani/Family.  They indicate no aggressive cares desired if major issue like arrest.  They would like more basic restorative cares such as fluids and antibiotics if needed.      Clinically Significant Risk Factors Present on Admission                  # Hypertension: home medication list includes antihypertensive(s)   # Acute Respiratory Failure: Documented O2 saturation < 91%.  Continue supplemental oxygen as needed     # Overweight: Estimated body mass index is 27.48 kg/m  as calculated from the following:    Height as of 1/15/23: 1.524 m (5').    Weight as of 1/26/23: 63.8 kg (140 lb 11.2 oz).           Disposition Plan   Expect at least a 2+ night hospital stay at time of admission.       Morgan Manuel,   Hospitalist Service  [facility]  Securely message with [employer] (more info)  Text page via [facility] Paging/Directory   ____________________________________________________________________    Chief Complaint   Hypoglycemia    History of Present Illness   History  is obtained from the patient and also reviewed with ED physician and care center records.  Of note patient discharged and initially well.  He had glucoses over 600 yesterday with multiple doses of 15+ units short acting insulin.  He also had some long acting insulin.  Eventually the glu improved but this AM became hypoglycemic which required treatment.  Intake quite variable since discharge with him preferring some sugar sources like sherbert by report.  His family present report historically he has erratic eating and frequently prefers candy/sugar items.  Patient denies pain, sob, nausea.  He doesn't remember much of yesterday.      Past Medical History    Past Medical History:   Diagnosis Date     Anemia      Anemia of chronic disease 5/14/2020     Back pain      CKD (chronic kidney disease) stage 3, GFR 30-59 ml/min (H)      CRF (chronic renal failure), stage 3  5/14/2020     Fall 11/2019    suffered multiple left rib fractures, C3 and T2 fractures     Mixed hyperlipidemia 7/7/2004     Paroxysmal atrial fibrillation (H)      Personal history of colonic polyps 1972    gets colonoscopy every five years, due in 2006     Pleural effusion on left 11/2019    after multiple rib fractures     Pulmonary hypertension (H) 5/10/2020    Added automatically from request for surgery [id number]     Recurrent Left Pleural effusion -- S/P Pleurex Cath 5/12/20 12/30/2019     Rosacea 1989     Type II or unspecified type diabetes mellitus without mention of complication, not stated as uncontrolled 1999     Unspecified essential hypertension 1994       Past Surgical History   Past Surgical History:   Procedure Laterality Date     ANESTHESIA CARDIOVERSION N/A 2/3/2020    Procedure: ANESTHESIA, FOR CARDIOVERSION;  Surgeon: GENERIC ANESTHESIA PROVIDER;  Location:  OR      RIGHT HEART CATH MEASUREMENTS RECORDED N/A 5/13/2020    Procedure: Right Heart Cath;  Surgeon: Senthil Silva MD;  Location:  HEART CARDIAC CATH LAB       REMOVE TONSILS/ADENOIDS,<11 Y/O      T & A <11y.o.     IR CHEST TUBE DRAIN TUNNELED LEFT  2020     IR CHEST TUBE PLACEMENT NON-TUNNELLED LEFT  2020     IR CHEST TUBE REMOVAL NON TUNNELED LEFT  2020     IR CHEST TUBE REMOVAL TUNNELED LEFT  2020     LAPAROSCOPIC CHOLECYSTECTOMY N/A 2020    Procedure: CHOLECYSTECTOMY, LAPAROSCOPIC;  Surgeon: Annette Lambert MD;  Location:  OR     LAPAROSCOPIC HERNIORRHAPHY INGUINAL BILATERAL Bilateral 10/27/2020    Procedure: LAPAROSCOPIC BILATERAL INGUINAL HERNIA REPAIR WITH MESH;  Surgeon: Brian Garsia MD;  Location:  OR       Prior to Admission Medications   Prior to Admission Medications   Prescriptions Last Dose Informant Patient Reported? Taking?   amLODIPine (NORVASC) 10 MG tablet   No No   Sig: Take 1 tablet (10 mg) by mouth daily   carvedilol (COREG) 12.5 MG tablet   No No   Sig: Take 1 tablet (12.5 mg) by mouth 2 times daily (with meals)   furosemide (LASIX) 40 MG tablet  Self No No   Sig: Take 1 1/2 tablets(60mg) by mouth every day   glucagon 1 MG kit   Yes No   Si mg as needed for low blood sugar   insulin aspart (NOVOLOG PEN) 100 UNIT/ML pen   No No   Sig: Inject 1-7 Units Subcutaneous 3 times daily (before meals)   insulin glargine (LANTUS PEN) 100 UNIT/ML pen   No No   Sig: Inject 15 Units Subcutaneous At Bedtime   insulin lispro (HUMALOG) 100 UNIT/ML Cartridge   No No   Sig: Inject 1-10 Units Subcutaneous 3 times daily (before meals)   irbesartan (AVAPRO) 150 MG tablet  Self No No   Sig: Take 1 tablet (150 mg) by mouth At Bedtime   irbesartan (AVAPRO) 300 MG tablet   No No   Sig: Take 1 tablet (300 mg) by mouth At Bedtime   nystatin (MYCOSTATIN) 700497 UNIT/GM external cream  Self Yes No   Sig: Apply topically 2 times daily as needed    tamsulosin (FLOMAX) 0.4 MG capsule   No No   Sig: Take 1 capsule (0.4 mg) by mouth daily      Facility-Administered Medications: None      Allergies   Allergies   Allergen Reactions     No  Known Drug Allergies           Physical Exam   Vital Signs: Temp: (!) 96.6  F (35.9  C) Temp src: Temporal BP: 94/63 Pulse: 55   Resp: 17 SpO2: 99 % O2 Device: Nasal cannula Oxygen Delivery: 3 LPM  Weight: 0 lbs 0 oz    GEN:  Alert, oriented to self.  Appears sleepy but arouses to name/speech.   Knows in hospital but identifies wrong one/wrong city.  Confused with details but answers basic questions and follows simple commands well.    HEENT:  Normocephalic/atraumatic, no scleral icterus, no nasal discharge, mouth moist.  CV:  Irreg Irreg with rate 50-60's.  Distant.  No rub.  LUNGS:  Clear to auscultation bilaterally without rales/rhonchi/wheezing/retractions.  Breath sounds mildly diminished bases.  Symmetric chest rise on inhalation noted.  ABD:  Active bowel sounds, soft, non-tender/non-distended.  No guarding/rigidity.  EXT:  No edema.  No cyanosis.  No acute joint synovitis noted.  SKIN:  Dry to touch, no exanthems noted in the visualized areas.  NEURO:  Moves extremities well on general exam, sensation to touch grossly intact.  Follows my basic commands well  No new focal deficits appreciated.    Data     Recent Labs   Lab 01/29/23  0633 01/25/23  1014 01/24/23  0847   WBC 9.9  --  9.9   HGB 14.4  --  13.5   HCT 43.8  --  42.3   MCV 84  --  85    270 244     Recent Labs   Lab 01/29/23  0830 01/29/23  0701 01/29/23  0633 01/24/23  1201 01/24/23  0847   NA  --   --  136  --  136   POTASSIUM  --   --  3.5  --  3.5   CHLORIDE  --   --  96*  --  94*   CO2  --   --  31*  --  31*   ANIONGAP  --   --  9  --  11   * 177* 135*   < > 138*   BUN  --   --  29.4*  --  25.9*   CR  --   --  1.68*  --  1.43*   GFRESTIMATED  --   --  40*  --  49*   LLOYD  --   --  9.6  --  9.5    < > = values in this interval not displayed.     UA requested, will review when back.    Recent Results (from the past 24 hour(s))   XR Chest Port 1 View    Narrative    EXAM: XR CHEST PORTABLE 1 VIEW  LOCATION: [facility]  HOSPITAL  DATE/TIME: 01/29/2023, 7:42 AM    INDICATION: Hypoxia, pneumonia.  COMPARISON: 01/15/2023.      Impression    IMPRESSION: Stable small left pleural fluid. Stable streaky opacities at the left lung base. No new airspace disease identified. Multiple subacute bilateral rib fractures are stable. Normal cardiac silhouette.

## 2023-01-29 NOTE — ED TRIAGE NOTES
Pt arrived via EMS from [facility]. Care staff reports around 4am, pt was yelling out for help and they took vitals and found BG to be 39. Staff gave glucagon and BG increased to 70. Staff reports recent changes in insulin. EMS BG 96. EMS found pt hypoxic on room air (86%)    ED    Triage Assessment     Row Name 01/29/23 0624       Triage Assessment (Adult)    Airway WDL WDL       Respiratory WDL    Respiratory WDL WDL       Skin Circulation/Temperature WDL    Skin Circulation/Temperature WDL WDL       Cardiac WDL    Cardiac WDL WDL       Peripheral/Neurovascular WDL    Peripheral Neurovascular WDL WDL       Cognitive/Neuro/Behavioral WDL    Cognitive/Neuro/Behavioral WDL X

## 2023-01-29 NOTE — PLAN OF CARE
Goal Outcome Evaluation:    Orientations: AOx4  Vitals/Pain: VSS. RA. Denies pain  Tele: Afib  Lines/Drains: PIV x2, Pruett catheter WDL  Skin/Wounds: Scattered bruising and scabs   GI/: Pruett WDL   LS: Clear, diminished bases  Labs: Abnormal/Trends, Electrolyte Replacement- Insulin gtt  Ambulation/Assist: Ax1 w/ GB and walker   Sleep Quality:   Plan: Insulin gtt

## 2023-01-30 ENCOUNTER — APPOINTMENT (OUTPATIENT)
Dept: PHYSICAL THERAPY | Facility: CLINIC | Age: 83
DRG: 637 | End: 2023-01-30
Attending: INTERNAL MEDICINE
Payer: COMMERCIAL

## 2023-01-30 ENCOUNTER — APPOINTMENT (OUTPATIENT)
Dept: SPEECH THERAPY | Facility: CLINIC | Age: 83
DRG: 637 | End: 2023-01-30
Payer: COMMERCIAL

## 2023-01-30 ENCOUNTER — APPOINTMENT (OUTPATIENT)
Dept: OCCUPATIONAL THERAPY | Facility: CLINIC | Age: 83
DRG: 637 | End: 2023-01-30
Attending: INTERNAL MEDICINE
Payer: COMMERCIAL

## 2023-01-30 LAB
ANION GAP SERPL CALCULATED.3IONS-SCNC: 9 MMOL/L (ref 7–15)
ATRIAL RATE - MUSE: NORMAL BPM
BUN SERPL-MCNC: 20.6 MG/DL (ref 8–23)
CALCIUM SERPL-MCNC: 8.2 MG/DL (ref 8.8–10.2)
CHLORIDE SERPL-SCNC: 101 MMOL/L (ref 98–107)
CREAT SERPL-MCNC: 1.28 MG/DL (ref 0.67–1.17)
DEPRECATED HCO3 PLAS-SCNC: 26 MMOL/L (ref 22–29)
DIASTOLIC BLOOD PRESSURE - MUSE: NORMAL MMHG
ERYTHROCYTE [DISTWIDTH] IN BLOOD BY AUTOMATED COUNT: 14.6 % (ref 10–15)
GFR SERPL CREATININE-BSD FRML MDRD: 56 ML/MIN/1.73M2
GLUCOSE BLDC GLUCOMTR-MCNC: 104 MG/DL (ref 70–99)
GLUCOSE BLDC GLUCOMTR-MCNC: 120 MG/DL (ref 70–99)
GLUCOSE BLDC GLUCOMTR-MCNC: 126 MG/DL (ref 70–99)
GLUCOSE BLDC GLUCOMTR-MCNC: 131 MG/DL (ref 70–99)
GLUCOSE BLDC GLUCOMTR-MCNC: 135 MG/DL (ref 70–99)
GLUCOSE BLDC GLUCOMTR-MCNC: 147 MG/DL (ref 70–99)
GLUCOSE BLDC GLUCOMTR-MCNC: 151 MG/DL (ref 70–99)
GLUCOSE BLDC GLUCOMTR-MCNC: 152 MG/DL (ref 70–99)
GLUCOSE BLDC GLUCOMTR-MCNC: 158 MG/DL (ref 70–99)
GLUCOSE BLDC GLUCOMTR-MCNC: 176 MG/DL (ref 70–99)
GLUCOSE BLDC GLUCOMTR-MCNC: 202 MG/DL (ref 70–99)
GLUCOSE BLDC GLUCOMTR-MCNC: 224 MG/DL (ref 70–99)
GLUCOSE BLDC GLUCOMTR-MCNC: 51 MG/DL (ref 70–99)
GLUCOSE BLDC GLUCOMTR-MCNC: 59 MG/DL (ref 70–99)
GLUCOSE BLDC GLUCOMTR-MCNC: 61 MG/DL (ref 70–99)
GLUCOSE BLDC GLUCOMTR-MCNC: 79 MG/DL (ref 70–99)
GLUCOSE BLDC GLUCOMTR-MCNC: 90 MG/DL (ref 70–99)
GLUCOSE SERPL-MCNC: 138 MG/DL (ref 70–99)
HCT VFR BLD AUTO: 37.4 % (ref 40–53)
HGB BLD-MCNC: 12.2 G/DL (ref 13.3–17.7)
INTERPRETATION ECG - MUSE: NORMAL
MCH RBC QN AUTO: 27.2 PG (ref 26.5–33)
MCHC RBC AUTO-ENTMCNC: 32.6 G/DL (ref 31.5–36.5)
MCV RBC AUTO: 83 FL (ref 78–100)
P AXIS - MUSE: NORMAL DEGREES
PLATELET # BLD AUTO: 233 10E3/UL (ref 150–450)
POTASSIUM SERPL-SCNC: 3.7 MMOL/L (ref 3.4–5.3)
PR INTERVAL - MUSE: NORMAL MS
QRS DURATION - MUSE: 94 MS
QT - MUSE: 426 MS
QTC - MUSE: 466 MS
R AXIS - MUSE: 52 DEGREES
RBC # BLD AUTO: 4.49 10E6/UL (ref 4.4–5.9)
SODIUM SERPL-SCNC: 136 MMOL/L (ref 136–145)
SYSTOLIC BLOOD PRESSURE - MUSE: NORMAL MMHG
T AXIS - MUSE: -19 DEGREES
VENTRICULAR RATE- MUSE: 72 BPM
WBC # BLD AUTO: 12.2 10E3/UL (ref 4–11)

## 2023-01-30 PROCEDURE — 97535 SELF CARE MNGMENT TRAINING: CPT | Mod: GO

## 2023-01-30 PROCEDURE — 85027 COMPLETE CBC AUTOMATED: CPT | Performed by: INTERNAL MEDICINE

## 2023-01-30 PROCEDURE — 97116 GAIT TRAINING THERAPY: CPT | Mod: GP

## 2023-01-30 PROCEDURE — 97161 PT EVAL LOW COMPLEX 20 MIN: CPT | Mod: GP

## 2023-01-30 PROCEDURE — 92526 ORAL FUNCTION THERAPY: CPT | Mod: GN | Performed by: SPEECH-LANGUAGE PATHOLOGIST

## 2023-01-30 PROCEDURE — 80048 BASIC METABOLIC PNL TOTAL CA: CPT | Performed by: INTERNAL MEDICINE

## 2023-01-30 PROCEDURE — 36415 COLL VENOUS BLD VENIPUNCTURE: CPT | Performed by: INTERNAL MEDICINE

## 2023-01-30 PROCEDURE — 97530 THERAPEUTIC ACTIVITIES: CPT | Mod: GP

## 2023-01-30 PROCEDURE — 97165 OT EVAL LOW COMPLEX 30 MIN: CPT | Mod: GO

## 2023-01-30 PROCEDURE — 99232 SBSQ HOSP IP/OBS MODERATE 35: CPT | Performed by: HOSPITALIST

## 2023-01-30 PROCEDURE — 250N000013 HC RX MED GY IP 250 OP 250 PS 637: Performed by: HOSPITALIST

## 2023-01-30 PROCEDURE — 250N000013 HC RX MED GY IP 250 OP 250 PS 637: Performed by: INTERNAL MEDICINE

## 2023-01-30 PROCEDURE — 250N000012 HC RX MED GY IP 250 OP 636 PS 637: Performed by: INTERNAL MEDICINE

## 2023-01-30 PROCEDURE — 250N000012 HC RX MED GY IP 250 OP 636 PS 637: Performed by: HOSPITALIST

## 2023-01-30 PROCEDURE — 120N000001 HC R&B MED SURG/OB

## 2023-01-30 PROCEDURE — 258N000003 HC RX IP 258 OP 636: Performed by: INTERNAL MEDICINE

## 2023-01-30 RX ORDER — NICOTINE POLACRILEX 4 MG
15-30 LOZENGE BUCCAL
Status: DISCONTINUED | OUTPATIENT
Start: 2023-01-30 | End: 2023-01-31 | Stop reason: HOSPADM

## 2023-01-30 RX ORDER — DEXTROSE MONOHYDRATE 25 G/50ML
25-50 INJECTION, SOLUTION INTRAVENOUS
Status: DISCONTINUED | OUTPATIENT
Start: 2023-01-30 | End: 2023-01-31 | Stop reason: HOSPADM

## 2023-01-30 RX ORDER — TAMSULOSIN HYDROCHLORIDE 0.4 MG/1
0.4 CAPSULE ORAL DAILY
Status: DISCONTINUED | OUTPATIENT
Start: 2023-01-30 | End: 2023-01-31 | Stop reason: HOSPADM

## 2023-01-30 RX ORDER — AMLODIPINE BESYLATE 10 MG/1
10 TABLET ORAL DAILY
Status: DISCONTINUED | OUTPATIENT
Start: 2023-01-30 | End: 2023-01-31 | Stop reason: HOSPADM

## 2023-01-30 RX ORDER — CARVEDILOL 12.5 MG/1
12.5 TABLET ORAL 2 TIMES DAILY WITH MEALS
Status: DISCONTINUED | OUTPATIENT
Start: 2023-01-30 | End: 2023-01-31 | Stop reason: HOSPADM

## 2023-01-30 RX ADMIN — INSULIN GLARGINE 15 UNITS: 100 INJECTION, SOLUTION SUBCUTANEOUS at 13:18

## 2023-01-30 RX ADMIN — ACETAMINOPHEN 650 MG: 325 TABLET, FILM COATED ORAL at 17:42

## 2023-01-30 RX ADMIN — SODIUM CHLORIDE, PRESERVATIVE FREE: 5 INJECTION INTRAVENOUS at 08:24

## 2023-01-30 RX ADMIN — AMLODIPINE BESYLATE 10 MG: 10 TABLET ORAL at 11:44

## 2023-01-30 RX ADMIN — CARVEDILOL 12.5 MG: 12.5 TABLET, FILM COATED ORAL at 11:44

## 2023-01-30 RX ADMIN — DEXTROSE 15 G: 15 GEL ORAL at 18:28

## 2023-01-30 RX ADMIN — CARVEDILOL 12.5 MG: 12.5 TABLET, FILM COATED ORAL at 17:42

## 2023-01-30 RX ADMIN — TAMSULOSIN HYDROCHLORIDE 0.4 MG: 0.4 CAPSULE ORAL at 11:44

## 2023-01-30 RX ADMIN — INSULIN ASPART 4 UNITS: 100 INJECTION, SOLUTION INTRAVENOUS; SUBCUTANEOUS at 14:11

## 2023-01-30 ASSESSMENT — ACTIVITIES OF DAILY LIVING (ADL)
ADLS_ACUITY_SCORE: 28

## 2023-01-30 NOTE — PROGRESS NOTES
Received a coding inquiring requesting clarification of diagnosis of cerebral edema:    I am unable to edit note as it has already been co-signed.     Impression  Primary spontaneous R IPH-R lateral IVH small L lateral IVH with small associated SAH, moderate cerebral edema (present on admission) in setting of apixaban with poorly controlled HTN. ICH score: 2. Suspect pt has had ICH for days. 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

## 2023-01-30 NOTE — PLAN OF CARE
IMC status discontinued during shift. Alert and oriented x3-4. Vital signs stable on room air. Assist of 1 GBW. Tolerating minced and moist with thin liquids mod carb diet. Lung sounds diminished. Bowel sounds active, passing flatus, no BM during shift, cho in place, adequate urine output. Blanchable redness to sacrum/coccyx. Pain managed with PRN tylenol. Denies nausea. Tele A fib CVR. Insulin gtt discontinued during shift, transitioned to subcutaneous insulin. BG 51 at 1733 treated with PO intake and glucose gel x1, corrected to 158.

## 2023-01-30 NOTE — PLAN OF CARE
01/30/23 1524   Appointment Info   Signing Clinician's Name / Credentials (PT) Ramonita Briscoe DPT   Living Environment   People in Home spouse   Current Living Arrangements house   Home Accessibility stairs to enter home   Number of Stairs, Main Entrance 5   Stair Railings, Main Entrance railing on right side (ascending)   Number of Stairs, Within Home, Primary greater than 10 stairs   Stair Railings, Within Home, Primary other (see comments)   Transportation Anticipated agency;family or friend will provide   Living Environment Comments BR SET UP: tub/shower combo (pt reported he fell using this 2 weeks ago), grab bars, shower chair access if needed; standard toilet. Pt does not use AD baseline, has access to two walkers (unable to specify kind), but does not use.   Self-Care   Usual Activity Tolerance good   Current Activity Tolerance moderate   Regular Exercise Yes   Activity/Exercise/Self-Care Comment Pt admitted from [facility]   General Information   Onset of Illness/Injury or Date of Surgery 01/29/23   Referring Physician Morgan Manuel, DO   Patient/Family Therapy Goals Statement (PT) To get stronger and go home   Pertinent History of Current Problem (include personal factors and/or comorbidities that impact the POC) Tc Garcia is a 82 year old male admitted on 1/29/2023. He was recently discharged a couple days ago after a hospital stay for intracranial hemorrhage.  He also has long standing DM that has a history of control difficulties.   Cognition   Affect/Mental Status (Cognition) WFL   Orientation Status (Cognition) oriented x 3   Follows Commands (Cognition) WFL   Integumentary/Edema   Integumentary/Edema no deficits were identifed   Posture    Posture Forward head position;Protracted shoulders;Kyphosis   Range of Motion (ROM)   Range of Motion ROM is WFL   Strength (Manual Muscle Testing)   Strength Comments Pt demonstrates > 3/5 strength in BLE   Bed Mobility   Comment, (Bed Mobility) IND    Transfers   Comment, (Transfers) STS FWW SBA   Gait/Stairs (Locomotion)   Comment, (Gait/Stairs) Pt ambulated 5' with FWW, downward gaze, appropriate pancho, no LOB   Balance   Balance Comments Good dynamic balance with use of FWW   Clinical Impression   Criteria for Skilled Therapeutic Intervention Yes, treatment indicated   PT Diagnosis (PT) Impaired IND with mobility from baseline   Influenced by the following impairments Impaired functional strength, dynamic balance, activity tolerance   Functional limitations due to impairments Impaired IND with transfers, gait from baseline   Clinical Presentation (PT Evaluation Complexity) Stable/Uncomplicated   Clinical Presentation Rationale clinical judgement   Clinical Decision Making (Complexity) low complexity   Planned Therapy Interventions (PT) gait training;balance training   Risk & Benefits of therapy have been explained evaluation/treatment results reviewed;care plan/treatment goals reviewed;participants included;patient   Physical Therapy Goals   PT Frequency 5x/week   PT Predicted Duration/Target Date for Goal Attainment 02/06/23   PT Goals Transfers   PT: Transfers Modified independent;Sit to/from stand;Bed to/from chair;Assistive device   PT: Gait Supervision/stand-by assist;Greater than 200 feet;Rolling walker   PT: Stairs Modified independent;5 stairs;Rail on right   PT Discharge Planning   PT Discharge Recommendation (DC Rec) Transitional Care Facility   PT Rationale for DC Rec retrun to TCU to improve IND with functional mobility as pt continues to require A x 1 with FWW, fatigues with activity, demonstrates impaired functional strength and balance.   PT Brief overview of current status A x 1 FWW

## 2023-01-30 NOTE — PROGRESS NOTES
[facility]    Medicine Progress Note - Hospitalist Service    Date of Admission:  1/29/2023    Assessment & Plan   Tc Garcia is a 82 year old male admitted on 1/29/2023. He was recently discharged a couple days ago after a hospital stay for intracranial hemorrhage.  He also has long standing DM that has a history of control difficulties.     Symptomatic extreme glucose instability past 24 hours with glu over 600 and under 50  Type 1 diabetes per patient for decades   Recent Labs   Lab 01/30/23  1152 01/30/23  1051 01/30/23  0955 01/30/23  0937 01/30/23  0730 01/30/23  0610   * 176* 224* 202* 120* 135*     Glucometers are stable on intravenous insulin and Hgb A1c was 7.2%.    Stop intravenous insulin     Resume insulin glargine    Start a correction scale and carbohydrate ratios     Diabetic diet      ABBIE superimposed on CKD due to dehydration  Initial hypovolemic shock/hypotension from dehydration  BP improving after a couple liters of IVF in ED.  Lactic acid on review normal range.  Patient appeared quite dry on presentation, likely exacerbated by excess fluid loss with his 600 glu values yesterday.  Initially on presentation bladder mostly empty on scan also suggestive of dehydration given context.  He appears euvolemic and blood pressure is good.  Creatinine appears to be back to recent baseline.     Stop intravenous fluid     Monitor      Recent spontaneous intracranial hemorrhage while on anticoagulation  Chronic memory loss, recent exacerbation with confusion with neuro issues last admit:  CT appears stable with gradual evolution of prior bleed.  No new focal deficits on exam.      Continue to refrain from full anticoagulation for his atrial fibrillation     Chronic persistent atrial fibrillation   Not on anticoag with recent bleed     HTN history, recent hypotension  Good blood pressure today.    Resume carvedilol and amlodipine     Continue to hold angiotension receptor  "blocker and diuretic     Abnormal admission chest x-ray, \"patchy opacity lung bases\"  Indeterminate chest x-ray on admission last stay.  Appears stable today.   Denies cough, dyspnea.   Chest CT last stay with nodules that will require outpatient follow-up per protocol, no acute finding suggestive of pneumonia.    Follow up CT outpatient      Urinary retention  Pruett back in place.      Continue Pruett and tamsulosin     Episodes of hypoxia (hypoxic resp failure) in ED when drowsy  Resolved.       Deconditioning  PT/OT consults      Diet: Combination Diet Moderate Consistent Carb (60 g CHO per Meal) Diet; Minced and Moist Diet (level 5); Thin Liquids (level 0)    DVT Prophylaxis: Pneumatic Compression Devices  Pruett Catheter: PRESENT, indication: Retention  Lines: None     Cardiac Monitoring: None  Code Status: No CPR- Do NOT Intubate      Clinically Significant Risk Factors                        # Overweight: Estimated body mass index is 28.71 kg/m  as calculated from the following:    Height as of 1/30/23: 1.524 m (5').    Weight as of 1/30/23: 66.7 kg (147 lb)., PRESENT ON ADMISSION         Disposition Plan      Expected Discharge Date: 02/01/2023                  León Roach MD  Hospitalist Service  [facility]  Securely message with [employer] (more info)  Text page via [employer] Paging/Directory   ______________________________________________________________________    Interval History   Feeling fine today.  Blood pressure has stabilized and glucometers are better with intravenous insulin.  No infection has been found.      Physical Exam   Vital Signs: Temp: 97.4  F (36.3  C) Temp src: Oral BP: 132/73 Pulse: 57   Resp: 18 SpO2: 100 % O2 Device: None (Room air)    Weight: 0 lbs 0 oz    Constitutional: awake, alert, cooperative, no apparent distress  Respiratory: No increased work of breathing, good air exchange, clear to auscultation bilaterally, no crackles or " wheezing  Cardiovascular: regular rate and rhythm, normal S1 and S2, no S3 or S4, and no murmur noted  Neurologic: Awake, alert, oriented to name, place and time.    Neuropsychiatric: General: normal, calm and normal eye contact    Medical Decision Making       MANAGEMENT DISCUSSED with the following over the past 24 hours: patient   NOTE(S)/MEDICAL RECORDS REVIEWED over the past 24 hours: epic      Data     I have personally reviewed the following data over the past 24 hrs:    12.2 (H)  \   12.2 (L)   / 233     136 101 20.6 /  126 (H)   3.7 26 1.28 (H) \       Imaging results reviewed over the past 24 hrs:   No results found for this or any previous visit (from the past 24 hour(s)).

## 2023-01-30 NOTE — PLAN OF CARE
Goal Outcome Evaluation:    Alert, oriented to self and situation, not always place and time, forgetful.  VSS, Afib CVR, LS dim.  Sats well on RA, did use 1-2L overnight d/t presumed sleep apnea.  Remains in algo 1 overnight, BGs within target range all shift.  Pruett with adequate UOP.  Rested most of night, positions self in bed.  Per previous shift, ambulates w/ Ax1, GBW.

## 2023-01-30 NOTE — PROGRESS NOTES
01/30/23 1400   Appointment Info   Signing Clinician's Name / Credentials (OT) Sun Alford OTR/L   Living Environment   People in Home spouse   Current Living Arrangements house   Home Accessibility stairs to enter home   Number of Stairs, Main Entrance 5   Stair Railings, Main Entrance railing on right side (ascending)   Number of Stairs, Within Home, Primary greater than 10 stairs   Transportation Anticipated agency;family or friend will provide   Living Environment Comments BR SET UP: tub/shower combo (pt reported he fell using this 2 weeks ago), grab bars, shower chair access if needed; standard toilet. Pt does not use AD baseline, has access to two walkers (unable to specify kind), but does not use.   Self-Care   Usual Activity Tolerance good   Current Activity Tolerance moderate   Equipment Currently Used at Home walker, standard   Fall history within last six months yes   Number of times patient has fallen within last six months   (Pt had fall in [facility] 1/13/23)   Instrumental Activities of Daily Living (IADL)   Previous Responsibilities meal prep;housekeeping;shopping;driving;medication management;finances   IADL Comments Spouse does laundry, cleaning dishes   General Information   Onset of Illness/Injury or Date of Surgery 01/29/23   Referring Physician Morgan Manuel, DO   Additional Occupational Profile Info/Pertinent History of Current Problem Per chart: Tc Garcia is a 82 year old male admitted on 1/29/2023. He was recently discharged a couple days ago after a hospital stay for intracranial hemorrhage.  He also has long standing DM that has a history of control difficulties. Symptomatic extreme glucose instability past 24 hours with glu over 600 and under 50   Existing Precautions/Restrictions fall   Cognitive Status Examination   Orientation Status person;place  (Pt knew month accurately but had difficulty with day of the week and year)   Visual Perception   Visual Impairment/Limitations  WFL;corrective lenses for reading   Range of Motion Comprehensive   Comment, General Range of Motion WFL   Strength Comprehensive (MMT)   Comment, General Manual Muscle Testing (MMT) Assessment WFL   Bed Mobility   Bed Mobility supine-sit   Supine-Sit [place] (Bed Mobility) independent   Transfers   Transfers bed-chair transfer;toilet transfer   Transfer Skill: Bed to Chair/Chair to Bed   Bed-Chair [place] (Transfers) contact guard   Sit-Stand Transfer   Sit-Stand [place] (Transfers) contact guard   Toilet Transfer   Type (Toilet Transfer) sit-stand;stand-sit   [place]ando Level (Toilet Transfer) contact guard   Assistive Device (Toilet Transfer) grab bars/safety frame   Activities of Daily Living   BADL Assessment/Intervention lower body dressing   Lower Body Dressing Assessment/Training   [place] Level (Lower Body Dressing) independent   Position (Lower Body Dressing) supported sitting   Clinical Impression   Criteria for Skilled Therapeutic Interventions Met (OT) Yes, treatment indicated   OT Diagnosis Decreased IND for ADLs/IADLs   OT Problem List-Impairments impacting ADL problems related to;activity tolerance impaired;balance;mobility;strength   Assessment of Occupational Performance 1-3 Performance Deficits   Identified Performance Deficits Toilet transfer, standing G/H, IADLs   Planned Therapy Interventions (OT) ADL retraining;IADL retraining;transfer training   Clinical Decision Making Complexity (OT) low complexity   Risk & Benefits of therapy have been explained evaluation/treatment results reviewed;care plan/treatment goals reviewed;risks/benefits reviewed;current/potential barriers reviewed;participants voiced agreement with care plan;participants included;patient;spouse/significant other;daughter   OT Total Evaluation Time   OT Eval, Low Complexity Minutes (69156) 15   OT Goals   Therapy Frequency (OT) 3 times/wk   OT Predicted Duration/Target Date for Goal Attainment 02/03/23   OT  Goals Hygiene/Grooming;Toilet Transfer/Toileting   OT: Hygiene/Grooming modified independent;while standing   OT: Toilet Transfer/Toileting Modified independent;toilet transfer;cleaning and garment management;using adaptive equipment   Self-Care/Home Management   Self-Care/Home Mgmt/ADL, Compensatory, Meal Prep Minutes (85151) 10   Symptoms Noted During/After Treatment (Meal Preparation/Planning Training) none   Treatment Detail/Skilled Intervention Pt stood from EOB with VC for pushing off bed surface vs. using FWW. Pt CGA with ambulationg and chair transfer with VC for hand placement during transfer. Pt needed VC for toileting transfer for hand placement and walker safety. Pt able to demonstrated LB dressing in supported seated position with set up.   OT Discharge Planning   OT Plan toileting trasnfer with FWW safety, standing G/H to increase activity tolerance, tub/shower transfer   OT Discharge Recommendation (DC Rec) Transitional Care Facility   OT Rationale for DC Rec Pt has significant decrease in level of IND in ADLs/IADLs and activity tolerance from his baseline, which was IND without AD. Motivated and making improvements to return to baseline.He was recently in TCU from previous hosptial DCand would benefit from TCU stay to increase IND with funcitonal mobility, self cares, and activity tolerance.   OT Brief overview of current status SBA/CGA with chair/toilet transfer and A with managing IV pole.   Total Session Time   Timed Code Treatment Minutes 10   Total Session Time (sum of timed and untimed services) 25

## 2023-01-31 ENCOUNTER — APPOINTMENT (OUTPATIENT)
Dept: SPEECH THERAPY | Facility: CLINIC | Age: 83
DRG: 637 | End: 2023-01-31
Payer: COMMERCIAL

## 2023-01-31 VITALS
HEART RATE: 59 BPM | SYSTOLIC BLOOD PRESSURE: 123 MMHG | DIASTOLIC BLOOD PRESSURE: 75 MMHG | TEMPERATURE: 98.5 F | OXYGEN SATURATION: 99 % | RESPIRATION RATE: 20 BRPM

## 2023-01-31 LAB
ANION GAP SERPL CALCULATED.3IONS-SCNC: 9 MMOL/L (ref 7–15)
BACTERIA UR CULT: NO GROWTH
BUN SERPL-MCNC: 13.2 MG/DL (ref 8–23)
CALCIUM SERPL-MCNC: 8.4 MG/DL (ref 8.8–10.2)
CHLORIDE SERPL-SCNC: 100 MMOL/L (ref 98–107)
CREAT SERPL-MCNC: 1.21 MG/DL (ref 0.67–1.17)
DEPRECATED HCO3 PLAS-SCNC: 27 MMOL/L (ref 22–29)
GFR SERPL CREATININE-BSD FRML MDRD: 59 ML/MIN/1.73M2
GLUCOSE BLDC GLUCOMTR-MCNC: 114 MG/DL (ref 70–99)
GLUCOSE BLDC GLUCOMTR-MCNC: 130 MG/DL (ref 70–99)
GLUCOSE BLDC GLUCOMTR-MCNC: 150 MG/DL (ref 70–99)
GLUCOSE BLDC GLUCOMTR-MCNC: 171 MG/DL (ref 70–99)
GLUCOSE BLDC GLUCOMTR-MCNC: 198 MG/DL (ref 70–99)
GLUCOSE BLDC GLUCOMTR-MCNC: 66 MG/DL (ref 70–99)
GLUCOSE BLDC GLUCOMTR-MCNC: 73 MG/DL (ref 70–99)
GLUCOSE BLDC GLUCOMTR-MCNC: 89 MG/DL (ref 70–99)
GLUCOSE SERPL-MCNC: 148 MG/DL (ref 70–99)
POTASSIUM SERPL-SCNC: 3.6 MMOL/L (ref 3.4–5.3)
SODIUM SERPL-SCNC: 136 MMOL/L (ref 136–145)

## 2023-01-31 PROCEDURE — 250N000013 HC RX MED GY IP 250 OP 250 PS 637: Performed by: HOSPITALIST

## 2023-01-31 PROCEDURE — 80048 BASIC METABOLIC PNL TOTAL CA: CPT | Performed by: HOSPITALIST

## 2023-01-31 PROCEDURE — 36415 COLL VENOUS BLD VENIPUNCTURE: CPT | Performed by: HOSPITALIST

## 2023-01-31 PROCEDURE — 92526 ORAL FUNCTION THERAPY: CPT | Mod: GN | Performed by: SPEECH-LANGUAGE PATHOLOGIST

## 2023-01-31 PROCEDURE — 99239 HOSP IP/OBS DSCHRG MGMT >30: CPT | Performed by: HOSPITALIST

## 2023-01-31 RX ORDER — INSULIN LISPRO 100 [IU]/ML
1-10 INJECTION, SOLUTION INTRAVENOUS; SUBCUTANEOUS
DISCHARGE
Start: 2023-01-31 | End: 2023-02-10

## 2023-01-31 RX ORDER — IRBESARTAN 75 MG/1
75 TABLET ORAL AT BEDTIME
DISCHARGE
Start: 2023-01-31 | End: 2023-03-07

## 2023-01-31 RX ADMIN — TAMSULOSIN HYDROCHLORIDE 0.4 MG: 0.4 CAPSULE ORAL at 09:04

## 2023-01-31 RX ADMIN — CARVEDILOL 12.5 MG: 12.5 TABLET, FILM COATED ORAL at 09:04

## 2023-01-31 RX ADMIN — CARVEDILOL 12.5 MG: 12.5 TABLET, FILM COATED ORAL at 17:19

## 2023-01-31 RX ADMIN — INSULIN ASPART 3 UNITS: 100 INJECTION, SOLUTION INTRAVENOUS; SUBCUTANEOUS at 11:29

## 2023-01-31 RX ADMIN — AMLODIPINE BESYLATE 10 MG: 10 TABLET ORAL at 09:04

## 2023-01-31 RX ADMIN — INSULIN GLARGINE 15 UNITS: 100 INJECTION, SOLUTION SUBCUTANEOUS at 09:05

## 2023-01-31 ASSESSMENT — ACTIVITIES OF DAILY LIVING (ADL)
ADLS_ACUITY_SCORE: 28
ADLS_ACUITY_SCORE: 34
ADLS_ACUITY_SCORE: 34
ADLS_ACUITY_SCORE: 28
ADLS_ACUITY_SCORE: 28

## 2023-01-31 NOTE — DISCHARGE SUMMARY
"[facility]  Hospitalist Discharge Summary      Date of Admission:  1/29/2023  Date of Discharge:  1/31/2023  Discharging Provider: León Roach MD  Discharge Service: Hospitalist Service    Discharge Diagnoses   1. Hypovolemic hypotension   2. Acute hyperglycemia   3. Prerenal acute kidney injury   4. Acute on chronic urinary retention   5. Abnormal chest X-ray     Follow-ups Needed After Discharge   Follow-up Appointments     Follow Up and recommended labs and tests      Follow up with alf physician.  The following labs/tests are   recommended: BMP and CBC in 5 days.  Follow up with urologist next week.    Follow up with primary cardiologist and nephrologist as scheduled.             Unresulted Labs Ordered in the Past 30 Days of this Admission     Date and Time Order Name Status Description    1/29/2023  7:25 AM Blood Culture Line, venous Preliminary     1/29/2023  7:25 AM Blood Culture Peripheral Blood Preliminary       These results will be followed up by primary care provider     Discharge Disposition   Discharged to home  Condition at discharge: Stable    Hospital Course    HPI:  \"History is obtained from the patient and also reviewed with ED physician and care center records. Of note patient discharged and initially well.  He had glucoses over 600 yesterday with multiple doses of 15+ units short acting insulin.  He also had some long acting insulin.  Eventually the glu improved but this AM became hypoglycemic which required treatment.  Intake quite variable since discharge with him preferring some sugar sources like sherbert by report.  His family present report historically he has erratic eating and frequently prefers candy/sugar items.  Patient denies pain, sob, nausea.  He doesn't remember much of yesterday.\"    Symptomatic extreme glucose instability past 24 hours with glu over 600 and under 50  Type 1 diabetes per patient for decades   Recent Labs   Lab " 01/30/23  1152 01/30/23  1051 01/30/23  0955 01/30/23  0937 01/30/23  0730 01/30/23  0610   * 176* 224* 202* 120* 135*     Glucometers were stable on intravenous insulin and Hgb A1c was 7.2%.  Suspect that the hyperglycemia was due to his hypotension and acute kidney injury. He has been switched back to subcutaneous insulin.     Continue prior to admission glargine and correction scale     ABBIE superimposed on CKD due to dehydration  Initial hypovolemic shock/hypotension from dehydration  BP improving after a couple liters of IVF in ED.  Lactic acid on review normal range.  Patient appeared quite dry on presentation, likely exacerbated by excess fluid loss with his 600 glu values yesterday.  Initially on presentation bladder mostly empty on scan also suggestive of dehydration given context. This was likely all due to medication effects - he has been on multiple medications to control his blood pressure.   He appears euvolemic and blood pressure is good.  Creatinine appears to be back to recent baseline.     Follow up with nephrologist      Recent spontaneous intracranial hemorrhage while on anticoagulation  Chronic memory loss, recent exacerbation with confusion with neuro issues last admit:  CT appears stable with gradual evolution of prior bleed.  No new focal deficits on exam.      Continue to refrain from full anticoagulation for his atrial fibrillation     Chronic persistent atrial fibrillation     Not on anticoag with recent bleed     History of difficult to control hypertension   Good blood pressure today.  He has had historically difficult to control blood pressure but he presented with hypotension and required fluid resuscitation.  Lasix and angiotension receptor blocker were held.  Systolic blood pressure is still low 100's to 120's.      Discharge on carvedilol and low dose angiotension receptor blocker     Follow up with primary cardiologist/nephrologist/primary care provider to titrate blood  "pressure medications      Abnormal admission chest x-ray, \"patchy opacity lung bases\"  Indeterminate chest x-ray on admission last stay.  Appears stable today.   Denies cough, dyspnea.   Chest CT last stay with nodules that will require outpatient follow-up per protocol, no acute finding suggestive of pneumonia.    Follow up CT outpatient with primary care provider      Urinary retention  Pruett back in place.      Continue Pruett and tamsulosin     Follow up in urology clinic for voiding trial     Episodes of hypoxia (hypoxic resp failure) in ED when drowsy  Resolved.       Deconditioning  Discharge to transitional care unit     Consultations This Hospital Stay   SPEECH LANGUAGE PATH ADULT IP CONSULT  PHARMACY IP CONSULT  PHYSICAL THERAPY ADULT IP CONSULT  OCCUPATIONAL THERAPY ADULT IP CONSULT  OCCUPATIONAL THERAPY ADULT IP CONSULT  PHYSICAL THERAPY ADULT IP CONSULT  SPEECH LANGUAGE PATH ADULT IP CONSULT    Code Status   No CPR- Do NOT Intubate    Time Spent on this Encounter   I, León Roach MD, personally saw the patient today and spent greater than 30 minutes discharging this patient.       León Roach MD  [address] [place] [address]  Phone: [phone]  ______________________________________________________________________    Physical Exam   Vital Signs: Temp: 98.3  F (36.8  C) Temp src: Oral BP: 104/64 Pulse: 54   Resp: 20 SpO2: 97 % O2 Device: None (Room air)    Weight: 0 lbs 0 oz  Constitutional: awake, alert, cooperative, no apparent distress  Neurologic: Awake, alert  Neuropsychiatric: General: normal, calm and normal eye contact      Primary Care Physician   Jessika Cordoba    Discharge Orders      Primary Care - Care Coordination Referral      General info for SNF    Length of Stay Estimate: Short Term Care: Estimated # of Days <30  Condition at Discharge: Improving  Level of care:skilled   Rehabilitation Potential: " Good  Admission H&P remains valid and up-to-date: Yes  Recent Chemotherapy: N/A  Use Nursing Home Standing Orders: Yes     Mantoux instructions    Give two-step Mantoux (PPD) Per Facility Policy Yes     Follow Up and recommended labs and tests    Follow up with long-term physician.  The following labs/tests are recommended: BMP and CBC in 5 days.  Follow up with urologist next week.  Follow up with primary cardiologist and nephrologist as scheduled.     Reason for your hospital stay    Low blood pressure and high glucose     Glucose monitor nursing POCT    Before meals and at bedtime     Pruett catheter    To straight gravity drainage. Change catheter every 2 weeks and PRN for leaking or decreased urine output with signs of bladder distention. DO NOT change catheter without a specific Provider order IF diagnosis of benign prostatic hypertrophy (BPH), neurogenic bladder, or other urological conditions     Activity - Up with nursing assistance     No CPR- Do NOT Intubate     Occupational Therapy Adult Consult    Evaluate and treat as clinically indicated.    Reason:  deconditioning     Physical Therapy Adult Consult    Evaluate and treat as clinically indicated.    Reason:  deconditioning     Speech Language Path Adult Consult    Evaluate and treat as clinically indicated.    Reason: dysphagia     Fall precautions     Diet    Follow this diet upon discharge: Orders Placed This Encounter      Combination Diet Moderate Consistent Carb (60 g CHO per Meal) Diet; Minced and Moist Diet (level 5); Thin Liquids (level 0)       Significant Results and Procedures   Most Recent 3 CBC's:Recent Labs   Lab Test 01/30/23  0526 01/29/23  0633 01/25/23  1014 01/24/23  0847   WBC 12.2* 9.9  --  9.9   HGB 12.2* 14.4  --  13.5   MCV 83 84  --  85    264 270 244     Most Recent 3 BMP's:Recent Labs   Lab Test 01/31/23  1154 01/31/23  1128 01/31/23  0903 01/31/23  0548 01/30/23  0610 01/30/23  0526 01/29/23  0701 01/29/23  0633    NA  --   --   --  136  --  136  --  136   POTASSIUM  --   --   --  3.6  --  3.7  --  3.5   CHLORIDE  --   --   --  100  --  101  --  96*   CO2  --   --   --  27  --  26  --  31*   BUN  --   --   --  13.2  --  20.6  --  29.4*   CR  --   --   --  1.21*  --  1.28*  --  1.68*   ANIONGAP  --   --   --  9  --  9  --  9   LLOYD  --   --   --  8.4*  --  8.2*  --  9.6   * 171* 150* 148*   < > 138*   < > 135*    < > = values in this interval not displayed.   ,   Results for orders placed or performed during the hospital encounter of 01/29/23   XR Chest Port 1 View    Narrative    EXAM: XR CHEST PORTABLE 1 VIEW  LOCATION: [facility]  DATE/TIME: 01/29/2023, 7:42 AM    INDICATION: Hypoxia, pneumonia.  COMPARISON: 01/15/2023.      Impression    IMPRESSION: Stable small left pleural fluid. Stable streaky opacities at the left lung base. No new airspace disease identified. Multiple subacute bilateral rib fractures are stable. Normal cardiac silhouette.           Discharge Medications   Current Discharge Medication List      CONTINUE these medications which have CHANGED    Details   insulin lispro (HUMALOG) 100 UNIT/ML Cartridge Inject 1-10 Units Subcutaneous 3 times daily (before meals) Inject as per sliding scale  If 120-150 = 1 unit  151-200 = 2 units  201-250 = 4 units  251-300 = 8 units  301-350 = 10 units  351-400 = 12 units  401-450 = 14 units  451-500 = 16 units  501+ call MD    Associated Diagnoses: Type 2 diabetes mellitus with other neurologic complication, with long-term current use of insulin (H)      irbesartan (AVAPRO) 75 MG tablet Take 1 tablet (75 mg) by mouth At Bedtime    Associated Diagnoses: Chronic diastolic heart failure (H); Essential hypertension; Acute on chronic heart failure with preserved ejection fraction (HFpEF) (H); Chronic heart failure with preserved ejection fraction (HFpEF) (H)         CONTINUE these medications which have NOT CHANGED    Details   acetaminophen  (TYLENOL) 325 MG tablet Take 650 mg by mouth every 4 hours as needed for mild pain or fever      carvedilol (COREG) 12.5 MG tablet Take 1 tablet (12.5 mg) by mouth 2 times daily (with meals)    Associated Diagnoses: Nontraumatic intraventricular intracerebral hemorrhage, unspecified laterality (H)      glucagon 1 MG kit 1 mg as needed for low blood sugar      insulin glargine (LANTUS PEN) 100 UNIT/ML pen Inject 15 Units Subcutaneous At Bedtime  Qty: 15 mL    Comments: If Lantus is not covered by insurance, may substitute Basaglar or Semglee or other insulin glargine product per insurance preference at same dose and frequency.    Associated Diagnoses: Nontraumatic intraventricular intracerebral hemorrhage, unspecified laterality (H)      tamsulosin (FLOMAX) 0.4 MG capsule Take 1 capsule (0.4 mg) by mouth daily    Associated Diagnoses: Nontraumatic intraventricular intracerebral hemorrhage, unspecified laterality (H)         STOP taking these medications       amLODIPine (NORVASC) 10 MG tablet Comments:   Reason for Stopping:         furosemide (LASIX) 40 MG tablet Comments:   Reason for Stopping:             Allergies   Allergies   Allergen Reactions     No Known Drug Allergies

## 2023-01-31 NOTE — PROGRESS NOTES
Notified provider about indwelling cho catheter discussed removal or continued need.    Did provider choose to remove indwelling cho catheter? No    Provider's cho indication for keeping indwelling cho catheter: Retention.    Is there an order for indwelling cho catheter? No

## 2023-01-31 NOTE — PROGRESS NOTES
Care Management Follow Up    Length of Stay (days): 2    Expected Discharge Date: 02/01/2023     Concerns to be Addressed:       Patient plan of care discussed at interdisciplinary rounds: Yes    Anticipated Discharge Disposition: Skilled Nursing Facility     Anticipated Discharge Services: None  Anticipated Discharge DME: None    Patient/family educated on Medicare website which has current facility and service quality ratings:    Education Provided on the Discharge Plan:    Patient/Family in Agreement with the Plan: yes    Referrals Placed by CM/SW:    Private pay costs discussed: Not applicable    Additional Information:  Patient has a bed hold at [facility]. Writer was notified patient is ready for discharge. Writer called Case at [facility] who states that patient has [employer] and will need a new prior authorization before discharge. Case will start prior authorization now and let BERTHA know when that is complete.       JERE Casas

## 2023-01-31 NOTE — PROVIDER NOTIFICATION
MD Notification    Notified Person: MD    Notified Person Name: Dr. Roach    Notification Date/Time: 1657    Notification Interaction: vocera message    Purpose of Notification: Need order for cho    Orders Received: no orders received    Comments: Clarified need for cho that was placed prior to patient transferring to floor from ED. Order was not placed at time of insertion. Paged provider to place order after provider confirmed need for cho.

## 2023-01-31 NOTE — PROGRESS NOTES
Care Management Discharge Note    Discharge Date: 01/31/2023       Discharge Disposition: Skilled Nursing Facility    Discharge Services: None    Discharge DME: None    Discharge Transportation: agency, family or friend will provide    Private pay costs discussed: transportation costs    PAS Confirmation Code:    Patient/family educated on Medicare website which has current facility and service quality ratings:      Education Provided on the Discharge Plan:    Persons Notified of Discharge Plans: Patient, spouse and daughter  Patient/Family in Agreement with the Plan: yes    Handoff Referral Completed: Yes    Additional Information:  Patient will be discharging at 7:30pm via [facility] to [facility]. Writer faxed orders and updated Reesa in admissions    JERE Casas

## 2023-01-31 NOTE — PLAN OF CARE
A&O, VSS on room air. Lung sounds diminished. Bowel sounds active, Cho in place w/ adequate urine output- plan to discharge with cho and follow up outpatient w/ urology Ambulates Ax1 w/ walker. Tolerating minced & moist diet. Darrick pain. Pt showered this shift. Discharge instructions given & questions answered. Pt discharge to [facility] via transport.

## 2023-02-01 ENCOUNTER — TELEPHONE (OUTPATIENT)
Dept: NEUROSURGERY | Facility: CLINIC | Age: 83
End: 2023-02-01
Payer: COMMERCIAL

## 2023-02-01 NOTE — PLAN OF CARE
Occupational Therapy Discharge Summary    Reason for therapy discharge:    Discharged to transitional care facility.    Progress towards therapy goal(s). See goals on Care Plan in [facility] electronic health record for goal details.  Goals not met.  Barriers to achieving goals:   discharge from facility.    Therapy recommendation(s):    Continued therapy is recommended.  Rationale/Recommendations:  To address safety and independence in I/ADLs.

## 2023-02-01 NOTE — PLAN OF CARE
Physical Therapy Discharge Summary    Reason for therapy discharge:    Discharged to transitional care facility.    Progress towards therapy goal(s). See goals on Care Plan in [facility] electronic health record for goal details.  Goals partially met.  Barriers to achieving goals:   discharge from facility.    Therapy recommendation(s):    Continued therapy is recommended.  Rationale/Recommendations:  TCU to improve IND with mobility as pt below baseline at this time .

## 2023-02-01 NOTE — PLAN OF CARE
Speech Language Therapy Discharge Summary    Reason for therapy discharge:    Discharged to transitional care facility.    Progress towards therapy goal(s). See goals on Care Plan in [facility] electronic health record for goal details.  Goals partially met.  Barriers to achieving goals:   discharge from facility.    Therapy recommendation(s):    Continued therapy is recommended.  Rationale/Recommendations:  see below.    Progress made in Tx.  Rec: continue an IDDSI Diet Level 5 Minced and Moist diet with thin liquids and reminders to use safe swallow strategies/precautions  Continue Tx to advance diet as appropriate

## 2023-02-02 ENCOUNTER — TRANSITIONAL CARE UNIT VISIT (OUTPATIENT)
Dept: GERIATRICS | Facility: CLINIC | Age: 83
End: 2023-02-02
Payer: COMMERCIAL

## 2023-02-02 VITALS
HEART RATE: 61 BPM | BODY MASS INDEX: 28.87 KG/M2 | DIASTOLIC BLOOD PRESSURE: 57 MMHG | TEMPERATURE: 98 F | SYSTOLIC BLOOD PRESSURE: 97 MMHG | HEIGHT: 60 IN | WEIGHT: 147.05 LBS | OXYGEN SATURATION: 96 % | RESPIRATION RATE: 16 BRPM

## 2023-02-02 DIAGNOSIS — E10.22 TYPE 1 DIABETES MELLITUS WITH DIABETIC CHRONIC KIDNEY DISEASE, UNSPECIFIED CKD STAGE (H): ICD-10-CM

## 2023-02-02 DIAGNOSIS — R33.9 URINARY RETENTION: ICD-10-CM

## 2023-02-02 DIAGNOSIS — N18.30 ANEMIA IN STAGE 3 CHRONIC KIDNEY DISEASE, UNSPECIFIED WHETHER STAGE 3A OR 3B CKD (H): ICD-10-CM

## 2023-02-02 DIAGNOSIS — N17.9 ACUTE KIDNEY INJURY (H): ICD-10-CM

## 2023-02-02 DIAGNOSIS — N18.30 STAGE 3 CHRONIC KIDNEY DISEASE, UNSPECIFIED WHETHER STAGE 3A OR 3B CKD (H): ICD-10-CM

## 2023-02-02 DIAGNOSIS — I48.0 PAF (PAROXYSMAL ATRIAL FIBRILLATION) (H): ICD-10-CM

## 2023-02-02 DIAGNOSIS — I50.32 CHRONIC DIASTOLIC HEART FAILURE (H): ICD-10-CM

## 2023-02-02 DIAGNOSIS — R53.81 PHYSICAL DECONDITIONING: ICD-10-CM

## 2023-02-02 DIAGNOSIS — D63.1 ANEMIA IN STAGE 3 CHRONIC KIDNEY DISEASE, UNSPECIFIED WHETHER STAGE 3A OR 3B CKD (H): ICD-10-CM

## 2023-02-02 DIAGNOSIS — E10.9: ICD-10-CM

## 2023-02-02 DIAGNOSIS — I61.5 RIGHT-SIDED NONTRAUMATIC INTRAVENTRICULAR INTRACEREBRAL HEMORRHAGE (H): Primary | ICD-10-CM

## 2023-02-02 DIAGNOSIS — R91.8 PULMONARY NODULES: ICD-10-CM

## 2023-02-02 DIAGNOSIS — I10 BENIGN ESSENTIAL HYPERTENSION: ICD-10-CM

## 2023-02-02 PROCEDURE — 99310 SBSQ NF CARE HIGH MDM 45: CPT | Performed by: NURSE PRACTITIONER

## 2023-02-02 NOTE — LETTER
2/2/2023        RE: Tc Garcia  [address] [facility] [id number]        [facility] GERIATRICS    PRIMARY CARE PROVIDER AND CLINIC:  Jessika Cordoba, [address] / [place] [address]  Chief Complaint   Patient presents with     Hospital F/U      [place] Medical Record Number:  [id number]  Place of Service where encounter took place:  [facility]) [25]    Tc Garcia  is a 82 year old  (1940), admitted to the above facility from  [facility]. Hospital stay 1/29/23 through 1/31/23..   HPI:    PMH of HTN, PAF, pulmonary HTN, CKD, anemia, DMI on insulin pump presents with weakness, dizziness and lightheaded.   Right parietal parenchymal hemorrhage, large intraventricular hemorrhage of right lateral ventrical, small left lateral intraventricular hemorrhage and small subarachnoid hemorrhage: hypertensive urgency, on anticoagularion, required nicardipine gtt for BP control then transitioned to oral meds, reversal of anticoagulation, f/u MRI showed stable hemorrhage  HTN: difficult to control, nicardipine gtt to oral meds, titrated oral to goal SBP < 160  DMI: insulin dependent on an insulin pump, episodes of hypoglycemia, hold on insulin pump, Lantus dose adjusted and titrated down due to hypoglycemia, patient readmitted 1/29-1/31 for hypoglycemia which was thought to be due to hypotension and ABBIE  ABBIE/CKD: baseline creat 1.4-1.7, stable during initial hospitalization, ABBIE on readmission a couple liters of IVF administered, dehydration in setting of extreme hyperglycemia as blood sugar reported to be up to 600 prior to admission.   UTI: required placement of cho, f/u with urology for TOV  Patchy opacity lung bases: , no symptoms of infection noted, chest CT with nodules will require OP f/u  On exam today: patient sitting up in w/c, alert, pleasant, denies pain or discomfort, denies H/A or vision changes, states he did have a Headache  last night which did resolve, patient is eating and drinking well, denies CP, palpitations, SOB, cough, congestion, N/V/D or constipation, states he is not sleeping well at night.     CODE STATUS/ADVANCE DIRECTIVES DISCUSSION:  No CPR- Do NOT Intubate  DNR / DNI  ALLERGIES:   Allergies   Allergen Reactions     No Known Drug Allergies       PAST MEDICAL HISTORY:   Past Medical History:   Diagnosis Date     Anemia      Anemia of chronic disease 5/14/2020     Back pain      CKD (chronic kidney disease) stage 3, GFR 30-59 ml/min (H)      CRF (chronic renal failure), stage 3  5/14/2020     Fall 11/2019    suffered multiple left rib fractures, C3 and T2 fractures     Mixed hyperlipidemia 7/7/2004     Paroxysmal atrial fibrillation (H)      Personal history of colonic polyps 1972    gets colonoscopy every five years, due in 2006     Pleural effusion on left 11/2019    after multiple rib fractures     Pulmonary hypertension (H) 5/10/2020    Added automatically from request for surgery [id number]     Recurrent Left Pleural effusion -- S/P Pleurex Cath 5/12/20 12/30/2019     Rosacea 1989     Type II or unspecified type diabetes mellitus without mention of complication, not stated as uncontrolled 1999     Unspecified essential hypertension 1994      PAST SURGICAL HISTORY:   has a past surgical history that includes REMOVE TONSILS/ADENOIDS,<13 Y/O; Anesthesia cardioversion (N/A, 2/3/2020); IR Chest Tube Drain Tunneled Left (5/12/2020); Right Heart Catheterization (N/A, 5/13/2020); IR Chest Tube Removal Tunneled Left (7/11/2020); IR Chest Tube Place Non Tunneled Left (7/11/2020); IR Chest Tube Removal Non Tunneled Left (7/17/2020); Laparoscopic herniorrhaphy inguinal bilateral (Bilateral, 10/27/2020); and Laparoscopic cholecystectomy (N/A, 11/22/2020).  FAMILY HISTORY: family history includes Diabetes in his brother, brother, brother, brother, sister, sister, and sister; Family History Negative in his brother, brother, father,  mother, sister, sister, and sister; Heart Disease in his sister.  SOCIAL HISTORY:   reports that he quit smoking about 15 years ago. His smoking use included cigarettes. He started smoking about 55 years ago. He has a 8.00 pack-year smoking history. He has never used smokeless tobacco. He reports that he does not currently use alcohol after a past usage of about 35.0 standard drinks per week. He reports that he does not currently use drugs.  Patient's living condition: lives with spouse    Post Discharge Medication Reconciliation Status:   MED REC REQUIRED  Post Medication Reconciliation Status: discharge medications reconciled and changed, per note/orders       Current Outpatient Medications   Medication Sig     acetaminophen (TYLENOL) 325 MG tablet Take 650 mg by mouth every 4 hours as needed for mild pain or fever     carvedilol (COREG) 12.5 MG tablet Take 1 tablet (12.5 mg) by mouth 2 times daily (with meals)     glucagon 1 MG kit 1 mg as needed for low blood sugar     insulin glargine (LANTUS PEN) 100 UNIT/ML pen Inject 15 Units Subcutaneous At Bedtime     insulin lispro (HUMALOG) 100 UNIT/ML Cartridge Inject 1-10 Units Subcutaneous 3 times daily (before meals) Inject as per sliding scale  If 120-150 = 1 unit  151-200 = 2 units  201-250 = 4 units  251-300 = 8 units  301-350 = 10 units  351-400 = 12 units  401-450 = 14 units  451-500 = 16 units  501+ call MD     irbesartan (AVAPRO) 75 MG tablet Take 1 tablet (75 mg) by mouth At Bedtime     tamsulosin (FLOMAX) 0.4 MG capsule Take 1 capsule (0.4 mg) by mouth daily     No current facility-administered medications for this visit.       ROS:  10 point ROS of systems including Constitutional, Eyes, Respiratory, Cardiovascular, Gastroenterology, Genitourinary, Integumentary, Musculoskeletal, Psychiatric were all negative except for pertinent positives noted in my HPI.    Vitals:  BP 97/57   Pulse 61   Temp 98  F (36.7  C)   Resp 16   Ht 1.524 m (5')   Wt 66.7 kg  (147 lb 0.8 oz)   SpO2 96%   BMI 28.72 kg/m    Exam:  GENERAL APPEARANCE:  Alert, in no distress  ENT:  Mouth and posterior oropharynx normal, moist mucous membranes, [place]  EYES:  EOM, conjunctivae, lids, pupils and irises normal, PERRL  RESP:  respiratory effort and palpation of chest normal, lungs clear to auscultation , no respiratory distress  CV:  Palpation and auscultation of heart done , regular rate and rhythm, no murmur, rub, or gallop, peripheral edema trace+ in LE bilaterally  ABDOMEN:  normal bowel sounds, soft, nontender, no hepatosplenomegaly or other masses  M/S:   patient sitting up in w/c  SKIN:  Inspection of skin and subcutaneous tissue baseline  NEURO:   speech wnl  PSYCH:  affect and mood normal    Lab/Diagnostic data:  Recent labs in [facility] reviewed by me today.  and   Most Recent 3 CBC's:Recent Labs   Lab Test 01/30/23  0526 01/29/23  0633 01/25/23  1014 01/24/23  0847   WBC 12.2* 9.9  --  9.9   HGB 12.2* 14.4  --  13.5   MCV 83 84  --  85    264 270 244     Most Recent 3 BMP's:Recent Labs   Lab Test 01/31/23  1708 01/31/23  1154 01/31/23  1128 01/31/23  0903 01/31/23  0548 01/30/23  0610 01/30/23  0526 01/29/23  0701 01/29/23  0633   NA  --   --   --   --  136  --  136  --  136   POTASSIUM  --   --   --   --  3.6  --  3.7  --  3.5   CHLORIDE  --   --   --   --  100  --  101  --  96*   CO2  --   --   --   --  27  --  26  --  31*   BUN  --   --   --   --  13.2  --  20.6  --  29.4*   CR  --   --   --   --  1.21*  --  1.28*  --  1.68*   ANIONGAP  --   --   --   --  9  --  9  --  9   LLOYD  --   --   --   --  8.4*  --  8.2*  --  9.6   GLC 73 198* 171*   < > 148*   < > 138*   < > 135*    < > = values in this interval not displayed.       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, unspecified CKD stage (H)  (E10.9) Unstable type 1 diabetes mellitus (H)  Comment: acute/ongoing unstable/labile  Plan: blood sugar monitoring QID and prn, lantus 15u qhs with sliding scale insulin coverage, dietician to follow  Blood sugar labile     (I10) Benign essential hypertension  (I48.0) PAF (paroxysmal atrial fibrillation) (H)  (I50.32) Chronic diastolic heart failure (H)  (N17.9) Acute kidney injury (H)  (N18.30) Stage 3 chronic kidney disease, unspecified whether stage 3a or 3b CKD (H)  Comment: acute/ongoing, baseline creat 1.4-1.6  Plan: BMP follow, goal SBP < 160, continue irbesartan 75mg qhs , coreg 12.5mg BID, no diuretic at this time     (N18.30,  D63.1) Anemia in stage 3 chronic kidney disease, unspecified whether stage 3a or 3b CKD (H)  Comment: chronic/ongoing  Plan: Hgb follow    (R33.9) Urinary retention  Comment: acute/ongoing  Plan: cho catheter in place, continue flomax 0.4mg QD, f/u [facility] urology as OP    (R91.8) Pulmonary nodules  Comment: incidental finding on CXR  Plan: f/u as OP    Orders:  BMP and Hgb on Monday  Melatonin 5mg qhs prn      Total time spent with patient visit at the skilled nursing facility was 45 min including patient visit and review of past records. Greater than 50% of total time spent with counseling and coordinating care due to discussed hospitalizations and blood sugar monitoring, discussed f/u with IVH, will need to f/u with neuro surgery as OP.     Electronically signed by:  Tonya Lynn Haase, APRN CNP                       Sincerely,        Tonya Lynn Haase, APRN CNP

## 2023-02-02 NOTE — PROGRESS NOTES
[facility] GERIATRICS    PRIMARY CARE PROVIDER AND CLINIC:  Jessika Cordoba, [address] / [place] [address]  Chief Complaint   Patient presents with     Hospital F/U      [place] Medical Record Number:  [id number]  Place of Service where encounter took place:  [facility]) [25]    Tc Garcia  is a 82 year old  (1940), admitted to the above facility from  [facility]. Hospital stay 1/29/23 through 1/31/23..   HPI:    PMH of HTN, PAF, pulmonary HTN, CKD, anemia, DMI on insulin pump presents with weakness, dizziness and lightheaded.   Right parietal parenchymal hemorrhage, large intraventricular hemorrhage of right lateral ventrical, small left lateral intraventricular hemorrhage and small subarachnoid hemorrhage: hypertensive urgency, on anticoagularion, required nicardipine gtt for BP control then transitioned to oral meds, reversal of anticoagulation, f/u MRI showed stable hemorrhage  HTN: difficult to control, nicardipine gtt to oral meds, titrated oral to goal SBP < 160  DMI: insulin dependent on an insulin pump, episodes of hypoglycemia, hold on insulin pump, Lantus dose adjusted and titrated down due to hypoglycemia, patient readmitted 1/29-1/31 for hypoglycemia which was thought to be due to hypotension and ABBIE  ABBIE/CKD: baseline creat 1.4-1.7, stable during initial hospitalization, ABBIE on readmission a couple liters of IVF administered, dehydration in setting of extreme hyperglycemia as blood sugar reported to be up to 600 prior to admission.   UTI: required placement of cho, f/u with urology for TOV  Patchy opacity lung bases: , no symptoms of infection noted, chest CT with nodules will require OP f/u  On exam today: patient sitting up in w/c, alert, pleasant, denies pain or discomfort, denies H/A or vision changes, states he did have a Headache last night which did resolve, patient is eating and drinking well, denies CP, palpitations,  SOB, cough, congestion, N/V/D or constipation, states he is not sleeping well at night.     CODE STATUS/ADVANCE DIRECTIVES DISCUSSION:  No CPR- Do NOT Intubate  DNR / DNI  ALLERGIES:   Allergies   Allergen Reactions     No Known Drug Allergies       PAST MEDICAL HISTORY:   Past Medical History:   Diagnosis Date     Anemia      Anemia of chronic disease 5/14/2020     Back pain      CKD (chronic kidney disease) stage 3, GFR 30-59 ml/min (H)      CRF (chronic renal failure), stage 3  5/14/2020     Fall 11/2019    suffered multiple left rib fractures, C3 and T2 fractures     Mixed hyperlipidemia 7/7/2004     Paroxysmal atrial fibrillation (H)      Personal history of colonic polyps 1972    gets colonoscopy every five years, due in 2006     Pleural effusion on left 11/2019    after multiple rib fractures     Pulmonary hypertension (H) 5/10/2020    Added automatically from request for surgery [id number]     Recurrent Left Pleural effusion -- S/P Pleurex Cath 5/12/20 12/30/2019     Rosacea 1989     Type II or unspecified type diabetes mellitus without mention of complication, not stated as uncontrolled 1999     Unspecified essential hypertension 1994      PAST SURGICAL HISTORY:   has a past surgical history that includes REMOVE TONSILS/ADENOIDS,<11 Y/O; Anesthesia cardioversion (N/A, 2/3/2020); IR Chest Tube Drain Tunneled Left (5/12/2020); Right Heart Catheterization (N/A, 5/13/2020); IR Chest Tube Removal Tunneled Left (7/11/2020); IR Chest Tube Place Non Tunneled Left (7/11/2020); IR Chest Tube Removal Non Tunneled Left (7/17/2020); Laparoscopic herniorrhaphy inguinal bilateral (Bilateral, 10/27/2020); and Laparoscopic cholecystectomy (N/A, 11/22/2020).  FAMILY HISTORY: family history includes Diabetes in his brother, brother, brother, brother, sister, sister, and sister; Family History Negative in his brother, brother, father, mother, sister, sister, and sister; Heart Disease in his sister.  SOCIAL HISTORY:   reports that  he quit smoking about 15 years ago. His smoking use included cigarettes. He started smoking about 55 years ago. He has a 8.00 pack-year smoking history. He has never used smokeless tobacco. He reports that he does not currently use alcohol after a past usage of about 35.0 standard drinks per week. He reports that he does not currently use drugs.  Patient's living condition: lives with spouse    Post Discharge Medication Reconciliation Status:   MED REC REQUIRED  Post Medication Reconciliation Status: discharge medications reconciled and changed, per note/orders       Current Outpatient Medications   Medication Sig     acetaminophen (TYLENOL) 325 MG tablet Take 650 mg by mouth every 4 hours as needed for mild pain or fever     carvedilol (COREG) 12.5 MG tablet Take 1 tablet (12.5 mg) by mouth 2 times daily (with meals)     glucagon 1 MG kit 1 mg as needed for low blood sugar     insulin glargine (LANTUS PEN) 100 UNIT/ML pen Inject 15 Units Subcutaneous At Bedtime     insulin lispro (HUMALOG) 100 UNIT/ML Cartridge Inject 1-10 Units Subcutaneous 3 times daily (before meals) Inject as per sliding scale  If 120-150 = 1 unit  151-200 = 2 units  201-250 = 4 units  251-300 = 8 units  301-350 = 10 units  351-400 = 12 units  401-450 = 14 units  451-500 = 16 units  501+ call MD     irbesartan (AVAPRO) 75 MG tablet Take 1 tablet (75 mg) by mouth At Bedtime     tamsulosin (FLOMAX) 0.4 MG capsule Take 1 capsule (0.4 mg) by mouth daily     No current facility-administered medications for this visit.       ROS:  10 point ROS of systems including Constitutional, Eyes, Respiratory, Cardiovascular, Gastroenterology, Genitourinary, Integumentary, Musculoskeletal, Psychiatric were all negative except for pertinent positives noted in my HPI.    Vitals:  BP 97/57   Pulse 61   Temp 98  F (36.7  C)   Resp 16   Ht 1.524 m (5')   Wt 66.7 kg (147 lb 0.8 oz)   SpO2 96%   BMI 28.72 kg/m    Exam:  GENERAL APPEARANCE:  Alert, in no  distress  ENT:  Mouth and posterior oropharynx normal, moist mucous membranes, [place]  EYES:  EOM, conjunctivae, lids, pupils and irises normal, PERRL  RESP:  respiratory effort and palpation of chest normal, lungs clear to auscultation , no respiratory distress  CV:  Palpation and auscultation of heart done , regular rate and rhythm, no murmur, rub, or gallop, peripheral edema trace+ in LE bilaterally  ABDOMEN:  normal bowel sounds, soft, nontender, no hepatosplenomegaly or other masses  M/S:   patient sitting up in w/c  SKIN:  Inspection of skin and subcutaneous tissue baseline  NEURO:   speech wnl  PSYCH:  affect and mood normal    Lab/Diagnostic data:  Recent labs in [facility] reviewed by me today.  and   Most Recent 3 CBC's:Recent Labs   Lab Test 01/30/23  0526 01/29/23  0633 01/25/23  1014 01/24/23  0847   WBC 12.2* 9.9  --  9.9   HGB 12.2* 14.4  --  13.5   MCV 83 84  --  85    264 270 244     Most Recent 3 BMP's:Recent Labs   Lab Test 01/31/23  1708 01/31/23  1154 01/31/23  1128 01/31/23  0903 01/31/23  0548 01/30/23  0610 01/30/23  0526 01/29/23  0701 01/29/23  0633   NA  --   --   --   --  136  --  136  --  136   POTASSIUM  --   --   --   --  3.6  --  3.7  --  3.5   CHLORIDE  --   --   --   --  100  --  101  --  96*   CO2  --   --   --   --  27  --  26  --  31*   BUN  --   --   --   --  13.2  --  20.6  --  29.4*   CR  --   --   --   --  1.21*  --  1.28*  --  1.68*   ANIONGAP  --   --   --   --  9  --  9  --  9   LLOYD  --   --   --   --  8.4*  --  8.2*  --  9.6   GLC 73 198* 171*   < > 148*   < > 138*   < > 135*    < > = values in this interval not displayed.       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, unspecified CKD stage (H)  (E10.9) Unstable type 1 diabetes mellitus (H)  Comment: acute/ongoing unstable/labile  Plan: blood sugar monitoring QID and prn, lantus 15u qhs with sliding scale insulin coverage, dietician to follow  Blood sugar labile     (I10) Benign essential hypertension  (I48.0) PAF (paroxysmal atrial fibrillation) (H)  (I50.32) Chronic diastolic heart failure (H)  (N17.9) Acute kidney injury (H)  (N18.30) Stage 3 chronic kidney disease, unspecified whether stage 3a or 3b CKD (H)  Comment: acute/ongoing, baseline creat 1.4-1.6  Plan: BMP follow, goal SBP < 160, continue irbesartan 75mg qhs , coreg 12.5mg BID, no diuretic at this time     (N18.30,  D63.1) Anemia in stage 3 chronic kidney disease, unspecified whether stage 3a or 3b CKD (H)  Comment: chronic/ongoing  Plan: Hgb follow    (R33.9) Urinary retention  Comment: acute/ongoing  Plan: cho catheter in place, continue flomax 0.4mg QD, f/u [facility] urology as OP    (R91.8) Pulmonary nodules  Comment: incidental finding on CXR  Plan: f/u as OP    Orders:  BMP and Hgb on Monday  Melatonin 5mg qhs prn      Total time spent with patient visit at the skilled nursing facility was 45 min including patient visit and review of past records. Greater than 50% of total time spent with counseling and coordinating care due to discussed hospitalizations and blood sugar monitoring, discussed f/u with IVH, will need to f/u with neuro surgery as OP.     Electronically signed by:  Tonya Lynn Haase, APRN CNP

## 2023-02-03 LAB
BACTERIA BLD CULT: NO GROWTH
BACTERIA BLD CULT: NO GROWTH

## 2023-02-06 ENCOUNTER — TRANSITIONAL CARE UNIT VISIT (OUTPATIENT)
Dept: GERIATRICS | Facility: CLINIC | Age: 83
End: 2023-02-06
Payer: COMMERCIAL

## 2023-02-06 VITALS
HEIGHT: 60 IN | BODY MASS INDEX: 29.8 KG/M2 | OXYGEN SATURATION: 98 % | SYSTOLIC BLOOD PRESSURE: 142 MMHG | HEART RATE: 58 BPM | RESPIRATION RATE: 17 BRPM | TEMPERATURE: 97.9 F | DIASTOLIC BLOOD PRESSURE: 89 MMHG | WEIGHT: 151.8 LBS

## 2023-02-06 DIAGNOSIS — I50.32 CHRONIC DIASTOLIC HEART FAILURE (H): ICD-10-CM

## 2023-02-06 DIAGNOSIS — I61.5 RIGHT-SIDED NONTRAUMATIC INTRAVENTRICULAR INTRACEREBRAL HEMORRHAGE (H): Primary | ICD-10-CM

## 2023-02-06 DIAGNOSIS — R91.8 PULMONARY NODULES: ICD-10-CM

## 2023-02-06 DIAGNOSIS — E10.22 TYPE 1 DIABETES MELLITUS WITH DIABETIC CHRONIC KIDNEY DISEASE, UNSPECIFIED CKD STAGE (H): ICD-10-CM

## 2023-02-06 DIAGNOSIS — E10.9: ICD-10-CM

## 2023-02-06 DIAGNOSIS — N18.30 STAGE 3 CHRONIC KIDNEY DISEASE, UNSPECIFIED WHETHER STAGE 3A OR 3B CKD (H): ICD-10-CM

## 2023-02-06 DIAGNOSIS — R33.9 URINARY RETENTION: ICD-10-CM

## 2023-02-06 DIAGNOSIS — I61.5 NONTRAUMATIC INTRAVENTRICULAR INTRACEREBRAL HEMORRHAGE, UNSPECIFIED LATERALITY (H): ICD-10-CM

## 2023-02-06 DIAGNOSIS — R53.81 PHYSICAL DECONDITIONING: ICD-10-CM

## 2023-02-06 DIAGNOSIS — I48.0 PAF (PAROXYSMAL ATRIAL FIBRILLATION) (H): ICD-10-CM

## 2023-02-06 DIAGNOSIS — N18.30 ANEMIA IN STAGE 3 CHRONIC KIDNEY DISEASE, UNSPECIFIED WHETHER STAGE 3A OR 3B CKD (H): ICD-10-CM

## 2023-02-06 DIAGNOSIS — D63.1 ANEMIA IN STAGE 3 CHRONIC KIDNEY DISEASE, UNSPECIFIED WHETHER STAGE 3A OR 3B CKD (H): ICD-10-CM

## 2023-02-06 DIAGNOSIS — I10 BENIGN ESSENTIAL HYPERTENSION: ICD-10-CM

## 2023-02-06 PROBLEM — E55.9 VITAMIN D DEFICIENCY: Status: ACTIVE | Noted: 2021-04-21

## 2023-02-06 PROBLEM — I12.9 MALIGNANT HYPERTENSIVE KIDNEY DISEASE WITH CHRONIC KIDNEY DISEASE STAGE I THROUGH STAGE IV, OR UNSPECIFIED(403.00): Status: ACTIVE | Noted: 2021-04-21

## 2023-02-06 PROBLEM — N18.32 STAGE 3B CHRONIC KIDNEY DISEASE (H): Status: ACTIVE | Noted: 2021-04-21

## 2023-02-06 PROBLEM — E11.21 DIABETIC NEPHROPATHY ASSOCIATED WITH TYPE 2 DIABETES MELLITUS (H): Status: ACTIVE | Noted: 2021-04-21

## 2023-02-06 PROBLEM — N25.81 SECONDARY HYPERPARATHYROIDISM OF RENAL ORIGIN (H): Status: ACTIVE | Noted: 2021-04-21

## 2023-02-06 PROBLEM — R80.9 NEPHROTIC RANGE PROTEINURIA: Status: ACTIVE | Noted: 2021-04-21

## 2023-02-06 PROCEDURE — 99309 SBSQ NF CARE MODERATE MDM 30: CPT | Performed by: NURSE PRACTITIONER

## 2023-02-06 NOTE — PROGRESS NOTES
[facility] GERIATRICS    Chief Complaint   Patient presents with     Nursing Home Acute     HPI:  Tc Garcia is a 82 year old  (1940), who is being seen today for an episodic care visit at: [facility]) [25]. Today's concern is:   Intercerebral hemorrage: at time of exam patient sitting on the edge of the bed, denies pain, headache, N/V, states he feels he is getting stronger, in therapy patient is walking up to 800 feet using a 4ww with SBA most ADL's are SBA  DMI: Blood suger AM: 133, 185, 184, noon: 351, 263, 399 PM: 155, 253, 241 and hs: 316, 135, 234, discussed blood sugars and avoiding hypoglycemia, patient states he is eating and drinking well and feels his blood sugar levels have been stable  HTN/PAF/CHF: /89, 125/72, 142/55 with HR 60 range, admit weight 147lbs and current weight 154lbs, denies SOB, CP, palpitations  Anemia: Hgb today 10.3  Urinary retention: has cho catheter in place      Allergies, and PMH/PSH reviewed in [employer] today.  REVIEW OF SYSTEMS:  10 point ROS of systems including Constitutional, Eyes, Respiratory, Cardiovascular, Gastroenterology, Genitourinary, Integumentary, Musculoskeletal, Psychiatric were all negative except for pertinent positives noted in my HPI.    Objective:   BP (!) 142/89   Pulse 58   Temp 97.9  F (36.6  C)   Resp 17   Ht 1.524 m (5')   Wt 68.9 kg (151 lb 12.8 oz)   SpO2 98%   BMI 29.65 kg/m    GENERAL APPEARANCE:  Alert, in no distress  ENT:  Mouth and posterior oropharynx normal, moist mucous membranes, [place]  EYES:  EOM, conjunctivae, lids, pupils and irises normal, PERRL  RESP:  respiratory effort and palpation of chest normal, lungs clear to auscultation , no respiratory distress  CV:  Palpation and auscultation of heart done , regular rate and rhythm, no murmur, rub, or gallop, peripheral edema trace+ in LE bilaterally  ABDOMEN:  normal bowel sounds, soft, nontender, no hepatosplenomegaly or other masses  M/S:   patient  sitting up on edge of bed  SKIN:  Inspection of skin and subcutaneous tissue baseline  NEURO:   speech wnl  PSYCH:  affect and mood normal    Recent labs in [facility] reviewed by me today.  and   Most Recent 3 CBC's:Recent Labs   Lab Test 01/30/23  0526 01/29/23  0633 01/25/23  1014 01/24/23  0847   WBC 12.2* 9.9  --  9.9   HGB 12.2* 14.4  --  13.5   MCV 83 84  --  85    264 270 244     Most Recent 3 BMP's:Recent Labs   Lab Test 01/31/23  1708 01/31/23  1154 01/31/23  1128 01/31/23  0903 01/31/23  0548 01/30/23  0610 01/30/23  0526 01/29/23  0701 01/29/23  0633   NA  --   --   --   --  136  --  136  --  136   POTASSIUM  --   --   --   --  3.6  --  3.7  --  3.5   CHLORIDE  --   --   --   --  100  --  101  --  96*   CO2  --   --   --   --  27  --  26  --  31*   BUN  --   --   --   --  13.2  --  20.6  --  29.4*   CR  --   --   --   --  1.21*  --  1.28*  --  1.68*   ANIONGAP  --   --   --   --  9  --  9  --  9   LLOYD  --   --   --   --  8.4*  --  8.2*  --  9.6   GLC 73 198* 171*   < > 148*   < > 138*   < > 135*    < > = values in this interval not displayed.       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, unspecified CKD stage (H)  (E10.9) Unstable type 1 diabetes mellitus (H)  Comment: acute/ongoing unstable/labile  Plan: blood sugar monitoring QID and prn, lantus 12u qhs (decreased from 15 on 2/3/23) contineu with sliding scale insulin coverage, dietician to follow  Goal to avoid hypoglycemia     (I10) Benign essential  hypertension  (I48.0) PAF (paroxysmal atrial fibrillation) (H)  (I50.32) Chronic diastolic heart failure (H)  (N17.9) Acute kidney injury (H)  (N18.30) Stage 3 chronic kidney disease, unspecified whether stage 3a or 3b CKD (H)  Comment: acute/ongoing, baseline creat 1.4-1.6, no change  Plan: BMP follow, goal SBP < 160, continue irbesartan 75mg qhs , coreg 12.5mg BID, no diuretic at this time      (N18.30,  D63.1) Anemia in stage 3 chronic kidney disease, unspecified whether stage 3a or 3b CKD (H)  Comment: chronic/ongoing, no change  Plan: Hgb follow, Hgb 10.3 on 2/6/23     (R33.9) Urinary retention  Comment: acute/ongoing, no change  Plan: cho catheter in place, continue flomax 0.4mg QD, f/u [facility] urology as OP     (R91.8) Pulmonary nodules  Comment: incidental finding on CXR  Plan: f/u as OP    MED REC REQUIRED  Post Medication Reconciliation Status: medication reconcilation previously completed during another office visit      Orders:  No changes in medications      Electronically signed by: Tonya Lynn Haase, APRN CNP

## 2023-02-06 NOTE — LETTER
2/6/2023        RE: Tc Garcia  [address] [address] [id number]        [facility] GERIATRICS    Chief Complaint   Patient presents with     Nursing Home Acute     HPI:  Tc Garcia is a 82 year old  (1940), who is being seen today for an episodic care visit at: [facility]) [25]. Today's concern is:   Intercerebral hemorrage: at time of exam patient sitting on the edge of the bed, denies pain, headache, N/V, states he feels he is getting stronger, in therapy patient is walking up to 800 feet using a 4ww with SBA most ADL's are SBA  DMI: Blood suger AM: 133, 185, 184, noon: 351, 263, 399 PM: 155, 253, 241 and hs: 316, 135, 234, discussed blood sugars and avoiding hypoglycemia, patient states he is eating and drinking well and feels his blood sugar levels have been stable  HTN/PAF/CHF: /89, 125/72, 142/55 with HR 60 range, admit weight 147lbs and current weight 154lbs, denies SOB, CP, palpitations  Anemia: Hgb today 10.3  Urinary retention: has cho catheter in place      Allergies, and PMH/PSH reviewed in [employer] today.  REVIEW OF SYSTEMS:  10 point ROS of systems including Constitutional, Eyes, Respiratory, Cardiovascular, Gastroenterology, Genitourinary, Integumentary, Musculoskeletal, Psychiatric were all negative except for pertinent positives noted in my HPI.    Objective:   BP (!) 142/89   Pulse 58   Temp 97.9  F (36.6  C)   Resp 17   Ht 1.524 m (5')   Wt 68.9 kg (151 lb 12.8 oz)   SpO2 98%   BMI 29.65 kg/m    GENERAL APPEARANCE:  Alert, in no distress  ENT:  Mouth and posterior oropharynx normal, moist mucous membranes, [place]  EYES:  EOM, conjunctivae, lids, pupils and irises normal, PERRL  RESP:  respiratory effort and palpation of chest normal, lungs clear to auscultation , no respiratory distress  CV:  Palpation and auscultation of heart done , regular rate and rhythm, no murmur, rub, or gallop, peripheral edema trace+ in LE bilaterally  ABDOMEN:  normal  bowel sounds, soft, nontender, no hepatosplenomegaly or other masses  M/S:   patient sitting up on edge of bed  SKIN:  Inspection of skin and subcutaneous tissue baseline  NEURO:   speech wnl  PSYCH:  affect and mood normal    Recent labs in [facility] reviewed by me today.  and   Most Recent 3 CBC's:Recent Labs   Lab Test 01/30/23  0526 01/29/23  0633 01/25/23  1014 01/24/23  0847   WBC 12.2* 9.9  --  9.9   HGB 12.2* 14.4  --  13.5   MCV 83 84  --  85    264 270 244     Most Recent 3 BMP's:Recent Labs   Lab Test 01/31/23  1708 01/31/23  1154 01/31/23  1128 01/31/23  0903 01/31/23  0548 01/30/23  0610 01/30/23  0526 01/29/23  0701 01/29/23  0633   NA  --   --   --   --  136  --  136  --  136   POTASSIUM  --   --   --   --  3.6  --  3.7  --  3.5   CHLORIDE  --   --   --   --  100  --  101  --  96*   CO2  --   --   --   --  27  --  26  --  31*   BUN  --   --   --   --  13.2  --  20.6  --  29.4*   CR  --   --   --   --  1.21*  --  1.28*  --  1.68*   ANIONGAP  --   --   --   --  9  --  9  --  9   LLOYD  --   --   --   --  8.4*  --  8.2*  --  9.6   GLC 73 198* 171*   < > 148*   < > 138*   < > 135*    < > = values in this interval not displayed.       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, unspecified CKD stage (H)  (E10.9) Unstable type 1 diabetes mellitus (H)  Comment: acute/ongoing unstable/labile  Plan: blood sugar monitoring QID and prn, lantus 12u qhs (decreased from 15 on 2/3/23) contineu with sliding scale insulin coverage,  dietician to follow  Goal to avoid hypoglycemia     (I10) Benign essential hypertension  (I48.0) PAF (paroxysmal atrial fibrillation) (H)  (I50.32) Chronic diastolic heart failure (H)  (N17.9) Acute kidney injury (H)  (N18.30) Stage 3 chronic kidney disease, unspecified whether stage 3a or 3b CKD (H)  Comment: acute/ongoing, baseline creat 1.4-1.6, no change  Plan: BMP follow, goal SBP < 160, continue irbesartan 75mg qhs , coreg 12.5mg BID, no diuretic at this time      (N18.30,  D63.1) Anemia in stage 3 chronic kidney disease, unspecified whether stage 3a or 3b CKD (H)  Comment: chronic/ongoing, no change  Plan: Hgb follow, Hgb 10.3 on 2/6/23     (R33.9) Urinary retention  Comment: acute/ongoing, no change  Plan: cho catheter in place, continue flomax 0.4mg QD, f/u [facility] urology as OP     (R91.8) Pulmonary nodules  Comment: incidental finding on CXR  Plan: f/u as OP    MED REC REQUIRED  Post Medication Reconciliation Status: medication reconcilation previously completed during another office visit      Orders:  No changes in medications      Electronically signed by: Tonya Lynn Haase, APRN CNP           Sincerely,        Tonya Lynn Haase, APRN CNP

## 2023-02-08 NOTE — PROGRESS NOTES
[facility] GERIATRICS    PRIMARY CARE PROVIDER AND CLINIC:  Jessika Cordoba, [address] / [place] [address]  Chief Complaint   Patient presents with     Hospital F/U      [facility] Medical Record Number:  [id number]  Place of Service where encounter took place:  [facility] ([facility])     Tc Garcia  is a 82 year old  (1940), admitted to the above facility from  [facility]. Hospital stay 1/29/23 through 1/31/23..       Hospital course below reviewed by me is as per the hospital discharge summary and nurse practitioner note    Past medical history hypertension, PAF not on anticoagulation, pulm hypertension, diabetes mellitus type 1, recent hospitalization January 5 Tietz 2023 through January 27, 2023 for the treatment of a nontraumatic intraventricular intracerebral hemorrhage, in the setting of hypertensive urgency.    Patient was readmitted for the treatment of hypotension secondary to dehydration in the setting of very labile blood glucoses.  Glucoses were managed with insulin drip followed by subcutaneous insulin.  Hypotension as well as ABBIE improved with correction of hyperglycemia and intravenous fluids.  Blood pressure stable during hospitalization.    Course complicated by urinary retention necessitating Pruett catheter placement.  No focal neurologic deficits noted.  Chest CT revealed pulmonary nodules which will need to be followed up in the outpatient setting.    Patient reports feeling stronger.  He is ambulating with a walker and therapy.  He denies headache, nausea vomiting, dizziness vision changes.  He states his appetite is good, although when he woke up this morning he had dry heaves.  At the time that I am seeing him in a.m., he is feeling better without any GI symptoms.    Blood pressures have been stable  Blood glucoses have been in the 100s to 300s.  Pruett was taken out this morning  He has not yet voided        CODE STATUS/ADVANCE  DIRECTIVES DISCUSSION:  No CPR- Do NOT Intubate  DNR / DNI  ALLERGIES:   Allergies   Allergen Reactions     No Known Drug Allergies       PAST MEDICAL HISTORY:   Past Medical History:   Diagnosis Date     Anemia      Anemia of chronic disease 5/14/2020     Back pain      CKD (chronic kidney disease) stage 3, GFR 30-59 ml/min (H)      CRF (chronic renal failure), stage 3  5/14/2020     Fall 11/2019    suffered multiple left rib fractures, C3 and T2 fractures     Mixed hyperlipidemia 7/7/2004     Paroxysmal atrial fibrillation (H)      Personal history of colonic polyps 1972    gets colonoscopy every five years, due in 2006     Pleural effusion on left 11/2019    after multiple rib fractures     Pulmonary hypertension (H) 5/10/2020    Added automatically from request for surgery [id number]     Recurrent Left Pleural effusion -- S/P Pleurex Cath 5/12/20 12/30/2019     Rosacea 1989     Type II or unspecified type diabetes mellitus without mention of complication, not stated as uncontrolled 1999     Unspecified essential hypertension 1994      PAST SURGICAL HISTORY:   has a past surgical history that includes REMOVE TONSILS/ADENOIDS,<13 Y/O; Anesthesia cardioversion (N/A, 2/3/2020); IR Chest Tube Drain Tunneled Left (5/12/2020); Right Heart Catheterization (N/A, 5/13/2020); IR Chest Tube Removal Tunneled Left (7/11/2020); IR Chest Tube Place Non Tunneled Left (7/11/2020); IR Chest Tube Removal Non Tunneled Left (7/17/2020); Laparoscopic herniorrhaphy inguinal bilateral (Bilateral, 10/27/2020); and Laparoscopic cholecystectomy (N/A, 11/22/2020).  FAMILY HISTORY: family history includes Diabetes in his brother, brother, brother, brother, sister, sister, and sister; Family History Negative in his brother, brother, father, mother, sister, sister, and sister; Heart Disease in his sister.  SOCIAL HISTORY:   reports that he quit smoking about 15 years ago. His smoking use included cigarettes. He started smoking about 55 years ago.  He has a 8.00 pack-year smoking history. He has never used smokeless tobacco. He reports that he does not currently use alcohol after a past usage of about 35.0 standard drinks per week. He reports that he does not currently use drugs.  Patient's living condition: lives with spouse    Current medications were reviewed by me today:       Current Outpatient Medications   Medication Sig     acetaminophen (TYLENOL) 325 MG tablet Take 650 mg by mouth every 4 hours as needed for mild pain or fever     carvedilol (COREG) 12.5 MG tablet Take 1 tablet (12.5 mg) by mouth 2 times daily (with meals)     glucagon 1 MG kit 1 mg as needed for low blood sugar     insulin glargine (LANTUS PEN) 100 UNIT/ML pen Inject 12 Units Subcutaneous At Bedtime     insulin lispro (HUMALOG) 100 UNIT/ML Cartridge Inject 1-10 Units Subcutaneous 3 times daily (before meals) Inject as per sliding scale  If 120-150 = 1 unit  151-200 = 2 units  201-250 = 4 units  251-300 = 8 units  301-350 = 10 units  351-400 = 12 units  401-450 = 14 units  451-500 = 16 units  501+ call MD     irbesartan (AVAPRO) 75 MG tablet Take 1 tablet (75 mg) by mouth At Bedtime     tamsulosin (FLOMAX) 0.4 MG capsule Take 1 capsule (0.4 mg) by mouth daily     No current facility-administered medications for this visit.       ROS:  10 point ROS of systems including Constitutional, Eyes, Respiratory, Cardiovascular, Gastroenterology, Genitourinary, Integumentary, Musculoskeletal, Psychiatric were all negative except for pertinent positives noted in my HPI.    Vitals:  BP (!) 148/81   Pulse 56   Temp 98.4  F (36.9  C)   Resp 18   Ht 1.524 m (5')   Wt 69.5 kg (153 lb 3.2 oz)   SpO2 96%   BMI 29.92 kg/m    Exam:  Thin appearing male, sitting up in bed.  He appears comfortable.  HEENT: Pharynx benign, oral mucosa moist  Neck supple  Lungs clear  CV irregular, heart rate 60s  Abdomen soft, nontender, nondistended  Extremities: No edema  Neuro: Alert, very pleasant, oriented to  person place and circumstances  Face symmetric  Speech fluent  No focal weakness is assessed with patient around    Lab/Diagnostic data:    Most Recent 3 CBC's:Recent Labs   Lab Test 01/30/23  0526 01/29/23  0633 01/25/23  1014 01/24/23  0847   WBC 12.2* 9.9  --  9.9   HGB 12.2* 14.4  --  13.5   MCV 83 84  --  85    264 270 244     Most Recent 3 BMP's:Recent Labs   Lab Test 01/31/23  1708 01/31/23  1154 01/31/23  1128 01/31/23  0903 01/31/23  0548 01/30/23  0610 01/30/23  0526 01/29/23  0701 01/29/23  0633   NA  --   --   --   --  136  --  136  --  136   POTASSIUM  --   --   --   --  3.6  --  3.7  --  3.5   CHLORIDE  --   --   --   --  100  --  101  --  96*   CO2  --   --   --   --  27  --  26  --  31*   BUN  --   --   --   --  13.2  --  20.6  --  29.4*   CR  --   --   --   --  1.21*  --  1.28*  --  1.68*   ANIONGAP  --   --   --   --  9  --  9  --  9   LLOYD  --   --   --   --  8.4*  --  8.2*  --  9.6   GLC 73 198* 171*   < > 148*   < > 138*   < > 135*    < > = values in this interval not displayed.     Most Recent 2 LFT's:Recent Labs   Lab Test 01/15/23  1321 03/22/21  0000   AST 11 12   ALT 11 5   ALKPHOS 76 62   BILITOTAL 1.6* 0.5     Most Recent TSH and T4:Recent Labs   Lab Test 01/15/23  1321 03/17/21  0752 08/10/20  1027   TSH 3.11   < > 4.75*   T4  --   --  1.11    < > = values in this interval not displayed.     Most Recent Hemoglobin A1c:Recent Labs   Lab Test 01/17/23  0436   A1C 7.2*           ASSESSMENT/PLAN:      (I61.5) Right-sided nontraumatic intraventricular intracerebral hemorrhage (H)  (primary encounter diagnosis)  (R53.81) Physical deconditioning  Comment: acute/ongoing Right parietal parenchymal hemorrhage, large intraventricular hemorrhage of right lateral ventrical, small left lateral intraventricular hemorrhage and small subarachnoid hemorrhage in the setting of hypertension and anticoagulation.  Neuro status stable.  Brain imaging stable.  No focal deficits.  Plan: PT and OT, f/u  with stroke neurology as OP, SBP goal < 160, hold anticoagulation    Subsequent hospitalization for hypotension, acute kidney injury in the setting of hyperglycemia and poor oral intake, diuretic use  ABBIE on chronic kidney disease secondary to hypotension and dehydration, resolved   Plan: Closely monitor blood pressure, manage blood glucoses, monitor renal function.  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  Fair control since readmission  Plan: Continue glargine and mealtime insulin.  Encouraged consistent diet     (I10) Benign essential hypertension  (I48.0) PAF (paroxysmal atrial fibrillation) (H)  (I50.32) Chronic diastolic heart failure (H)  Comment: CV status stable.  Currently on no diuretic, no evidence of volume overload  Plan:  continue irbesartan 75mg qhs , coreg 12.5mg BID, no diuretic.  Monitor vital signs, weight, BMP     (N18.30,  D63.1) Anemia in stage 3 chronic kidney disease, unspecified whether stage 3a or 3b CKD (H)  Comment: chronic/ongoing, stable  Plan: Monitor hemoglobin     (R33.9) Urinary retention  Comment: acute/ongoing, no change  Plan: Trial of voiding today     (R91.8) Pulmonary nodules  Comment: incidental finding on CXR  Plan: f/u as OP        Mathew Odonnell MD

## 2023-02-09 ENCOUNTER — TRANSITIONAL CARE UNIT VISIT (OUTPATIENT)
Dept: GERIATRICS | Facility: CLINIC | Age: 83
End: 2023-02-09
Payer: COMMERCIAL

## 2023-02-09 VITALS
RESPIRATION RATE: 18 BRPM | HEIGHT: 60 IN | DIASTOLIC BLOOD PRESSURE: 81 MMHG | SYSTOLIC BLOOD PRESSURE: 148 MMHG | WEIGHT: 153.2 LBS | BODY MASS INDEX: 30.08 KG/M2 | HEART RATE: 56 BPM | TEMPERATURE: 98.4 F | OXYGEN SATURATION: 96 %

## 2023-02-09 DIAGNOSIS — I10 BENIGN ESSENTIAL HYPERTENSION: ICD-10-CM

## 2023-02-09 DIAGNOSIS — I61.5 RIGHT-SIDED NONTRAUMATIC INTRAVENTRICULAR INTRACEREBRAL HEMORRHAGE (H): ICD-10-CM

## 2023-02-09 DIAGNOSIS — I48.0 PAF (PAROXYSMAL ATRIAL FIBRILLATION) (H): ICD-10-CM

## 2023-02-09 DIAGNOSIS — R33.9 URINARY RETENTION: ICD-10-CM

## 2023-02-09 DIAGNOSIS — I50.32 CHRONIC DIASTOLIC HEART FAILURE (H): ICD-10-CM

## 2023-02-09 DIAGNOSIS — E10.22 TYPE 1 DIABETES MELLITUS WITH DIABETIC CHRONIC KIDNEY DISEASE, UNSPECIFIED CKD STAGE (H): Primary | ICD-10-CM

## 2023-02-09 PROCEDURE — 99305 1ST NF CARE MODERATE MDM 35: CPT | Performed by: INTERNAL MEDICINE

## 2023-02-09 NOTE — LETTER
2/9/2023        RE: Tc Garcia  [id number] [facility] [id number]        [facility] GERIATRICS    PRIMARY CARE PROVIDER AND CLINIC:  Jessika Cordoba, [address] / [place] [address]  Chief Complaint   Patient presents with     Hospital /U      [facility] Medical Record Number:  [id number]  Place of Service where encounter took place:  [facility] ([facility])     Tc Garcia  is a 82 year old  (1940), admitted to the above facility from  [facility]. Hospital stay 1/29/23 through 1/31/23..       Hospital course below reviewed by me is as per the hospital discharge summary and nurse practitioner note    Past medical history hypertension, PAF not on anticoagulation, pulm hypertension, diabetes mellitus type 1, recent hospitalization January 5 [place]ietz 2023 through January 27, 2023 for the treatment of a nontraumatic intraventricular intracerebral hemorrhage, in the setting of hypertensive urgency.    Patient was readmitted for the treatment of hypotension secondary to dehydration in the setting of very labile blood glucoses.  Glucoses were managed with insulin drip followed by subcutaneous insulin.  Hypotension as well as ABBIE improved with correction of hyperglycemia and intravenous fluids.  Blood pressure stable during hospitalization.    Course complicated by urinary retention necessitating Pruett catheter placement.  No focal neurologic deficits noted.  Chest CT revealed pulmonary nodules which will need to be followed up in the outpatient setting.    Patient reports feeling stronger.  He is ambulating with a walker and therapy.  He denies headache, nausea vomiting, dizziness vision changes.  He states his appetite is good, although when he woke up this morning he had dry heaves.  At the time that I am seeing him in a.m., he is feeling better without any GI symptoms.    Blood pressures have been stable  Blood glucoses have been in the 100s to  300s.  Seble was taken out this morning  He has not yet voided        CODE STATUS/ADVANCE DIRECTIVES DISCUSSION:  No CPR- Do NOT Intubate  DNR / DNI  ALLERGIES:   Allergies   Allergen Reactions     No Known Drug Allergies       PAST MEDICAL HISTORY:   Past Medical History:   Diagnosis Date     Anemia      Anemia of chronic disease 5/14/2020     Back pain      CKD (chronic kidney disease) stage 3, GFR 30-59 ml/min (H)      CRF (chronic renal failure), stage 3  5/14/2020     Fall 11/2019    suffered multiple left rib fractures, C3 and T2 fractures     Mixed hyperlipidemia 7/7/2004     Paroxysmal atrial fibrillation (H)      Personal history of colonic polyps 1972    gets colonoscopy every five years, due in 2006     Pleural effusion on left 11/2019    after multiple rib fractures     Pulmonary hypertension (H) 5/10/2020    Added automatically from request for surgery [id number]     Recurrent Left Pleural effusion -- S/P Pleurex Cath 5/12/20 12/30/2019     Rosacea 1989     Type II or unspecified type diabetes mellitus without mention of complication, not stated as uncontrolled 1999     Unspecified essential hypertension 1994      PAST SURGICAL HISTORY:   has a past surgical history that includes REMOVE TONSILS/ADENOIDS,<13 Y/O; Anesthesia cardioversion (N/A, 2/3/2020); IR Chest Tube Drain Tunneled Left (5/12/2020); Right Heart Catheterization (N/A, 5/13/2020); IR Chest Tube Removal Tunneled Left (7/11/2020); IR Chest Tube Place Non Tunneled Left (7/11/2020); IR Chest Tube Removal Non Tunneled Left (7/17/2020); Laparoscopic herniorrhaphy inguinal bilateral (Bilateral, 10/27/2020); and Laparoscopic cholecystectomy (N/A, 11/22/2020).  FAMILY HISTORY: family history includes Diabetes in his brother, brother, brother, brother, sister, sister, and sister; Family History Negative in his brother, brother, father, mother, sister, sister, and sister; Heart Disease in his sister.  SOCIAL HISTORY:   reports that he quit smoking about  15 years ago. His smoking use included cigarettes. He started smoking about 55 years ago. He has a 8.00 pack-year smoking history. He has never used smokeless tobacco. He reports that he does not currently use alcohol after a past usage of about 35.0 standard drinks per week. He reports that he does not currently use drugs.  Patient's living condition: lives with spouse    Current medications were reviewed by me today:       Current Outpatient Medications   Medication Sig     acetaminophen (TYLENOL) 325 MG tablet Take 650 mg by mouth every 4 hours as needed for mild pain or fever     carvedilol (COREG) 12.5 MG tablet Take 1 tablet (12.5 mg) by mouth 2 times daily (with meals)     glucagon 1 MG kit 1 mg as needed for low blood sugar     insulin glargine (LANTUS PEN) 100 UNIT/ML pen Inject 12 Units Subcutaneous At Bedtime     insulin lispro (HUMALOG) 100 UNIT/ML Cartridge Inject 1-10 Units Subcutaneous 3 times daily (before meals) Inject as per sliding scale  If 120-150 = 1 unit  151-200 = 2 units  201-250 = 4 units  251-300 = 8 units  301-350 = 10 units  351-400 = 12 units  401-450 = 14 units  451-500 = 16 units  501+ call MD     irbesartan (AVAPRO) 75 MG tablet Take 1 tablet (75 mg) by mouth At Bedtime     tamsulosin (FLOMAX) 0.4 MG capsule Take 1 capsule (0.4 mg) by mouth daily     No current facility-administered medications for this visit.       ROS:  10 point ROS of systems including Constitutional, Eyes, Respiratory, Cardiovascular, Gastroenterology, Genitourinary, Integumentary, Musculoskeletal, Psychiatric were all negative except for pertinent positives noted in my HPI.    Vitals:  BP (!) 148/81   Pulse 56   Temp 98.4  F (36.9  C)   Resp 18   Ht 1.524 m (5')   Wt 69.5 kg (153 lb 3.2 oz)   SpO2 96%   BMI 29.92 kg/m    Exam:  Thin appearing male, sitting up in bed.  He appears comfortable.  HEENT: Pharynx benign, oral mucosa moist  Neck supple  Lungs clear  CV irregular, heart rate 60s  Abdomen soft,  nontender, nondistended  Extremities: No edema  Neuro: Alert, very pleasant, oriented to person place and circumstances  Face symmetric  Speech fluent  No focal weakness is assessed with patient around    Lab/Diagnostic data:    Most Recent 3 CBC's:Recent Labs   Lab Test 01/30/23  0526 01/29/23  0633 01/25/23  1014 01/24/23  0847   WBC 12.2* 9.9  --  9.9   HGB 12.2* 14.4  --  13.5   MCV 83 84  --  85    264 270 244     Most Recent 3 BMP's:Recent Labs   Lab Test 01/31/23  1708 01/31/23  1154 01/31/23  1128 01/31/23  0903 01/31/23  0548 01/30/23  0610 01/30/23  0526 01/29/23  0701 01/29/23  0633   NA  --   --   --   --  136  --  136  --  136   POTASSIUM  --   --   --   --  3.6  --  3.7  --  3.5   CHLORIDE  --   --   --   --  100  --  101  --  96*   CO2  --   --   --   --  27  --  26  --  31*   BUN  --   --   --   --  13.2  --  20.6  --  29.4*   CR  --   --   --   --  1.21*  --  1.28*  --  1.68*   ANIONGAP  --   --   --   --  9  --  9  --  9   LLOYD  --   --   --   --  8.4*  --  8.2*  --  9.6   GLC 73 198* 171*   < > 148*   < > 138*   < > 135*    < > = values in this interval not displayed.     Most Recent 2 LFT's:Recent Labs   Lab Test 01/15/23  1321 03/22/21  0000   AST 11 12   ALT 11 5   ALKPHOS 76 62   BILITOTAL 1.6* 0.5     Most Recent TSH and T4:Recent Labs   Lab Test 01/15/23  1321 03/17/21  0752 08/10/20  1027   TSH 3.11   < > 4.75*   T4  --   --  1.11    < > = values in this interval not displayed.     Most Recent Hemoglobin A1c:Recent Labs   Lab Test 01/17/23  0436   A1C 7.2*           ASSESSMENT/PLAN:      (I61.5) Right-sided nontraumatic intraventricular intracerebral hemorrhage (H)  (primary encounter diagnosis)  (R53.19) Physical deconditioning  Comment: acute/ongoing Right parietal parenchymal hemorrhage, large intraventricular hemorrhage of right lateral ventrical, small left lateral intraventricular hemorrhage and small subarachnoid hemorrhage in the setting of hypertension and anticoagulation.   Neuro status stable.  Brain imaging stable.  No focal deficits.  Plan: PT and OT, f/u with stroke neurology as OP, SBP goal < 160, hold anticoagulation    Subsequent hospitalization for hypotension, acute kidney injury in the setting of hyperglycemia and poor oral intake, diuretic use  ABBIE on chronic kidney disease secondary to hypotension and dehydration, resolved   Plan: Closely monitor blood pressure, manage blood glucoses, monitor renal function.  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  Fair control since readmission  Plan: Continue glargine and mealtime insulin.  Encouraged consistent diet     (I10) Benign essential hypertension  (I48.0) PAF (paroxysmal atrial fibrillation) (H)  (I50.32) Chronic diastolic heart failure (H)  Comment: CV status stable.  Currently on no diuretic, no evidence of volume overload  Plan:  continue irbesartan 75mg qhs , coreg 12.5mg BID, no diuretic.  Monitor vital signs, weight, BMP     (N18.30,  D63.1) Anemia in stage 3 chronic kidney disease, unspecified whether stage 3a or 3b CKD (H)  Comment: chronic/ongoing, stable  Plan: Monitor hemoglobin     (R33.9) Urinary retention  Comment: acute/ongoing, no change  Plan: Trial of voiding today     (R91.8) Pulmonary nodules  Comment: incidental finding on CXR  Plan: f/u as OP        Mathew Odonnell MD        Sincerely,        aMthew Odonnell MD

## 2023-02-10 ENCOUNTER — DISCHARGE SUMMARY NURSING HOME (OUTPATIENT)
Dept: GERIATRICS | Facility: CLINIC | Age: 83
End: 2023-02-10
Payer: COMMERCIAL

## 2023-02-10 VITALS
RESPIRATION RATE: 14 BRPM | BODY MASS INDEX: 30.04 KG/M2 | HEART RATE: 76 BPM | HEIGHT: 60 IN | DIASTOLIC BLOOD PRESSURE: 90 MMHG | WEIGHT: 153 LBS | SYSTOLIC BLOOD PRESSURE: 150 MMHG | OXYGEN SATURATION: 95 % | TEMPERATURE: 98.4 F

## 2023-02-10 DIAGNOSIS — R91.8 PULMONARY NODULES: ICD-10-CM

## 2023-02-10 DIAGNOSIS — E10.9: ICD-10-CM

## 2023-02-10 DIAGNOSIS — I61.5 RIGHT-SIDED NONTRAUMATIC INTRAVENTRICULAR INTRACEREBRAL HEMORRHAGE (H): ICD-10-CM

## 2023-02-10 DIAGNOSIS — R53.81 PHYSICAL DECONDITIONING: ICD-10-CM

## 2023-02-10 DIAGNOSIS — N18.30 ANEMIA IN STAGE 3 CHRONIC KIDNEY DISEASE, UNSPECIFIED WHETHER STAGE 3A OR 3B CKD (H): ICD-10-CM

## 2023-02-10 DIAGNOSIS — I50.32 CHRONIC DIASTOLIC HEART FAILURE (H): ICD-10-CM

## 2023-02-10 DIAGNOSIS — I48.0 PAF (PAROXYSMAL ATRIAL FIBRILLATION) (H): ICD-10-CM

## 2023-02-10 DIAGNOSIS — N18.30 STAGE 3 CHRONIC KIDNEY DISEASE, UNSPECIFIED WHETHER STAGE 3A OR 3B CKD (H): ICD-10-CM

## 2023-02-10 DIAGNOSIS — D63.1 ANEMIA IN STAGE 3 CHRONIC KIDNEY DISEASE, UNSPECIFIED WHETHER STAGE 3A OR 3B CKD (H): ICD-10-CM

## 2023-02-10 DIAGNOSIS — I10 BENIGN ESSENTIAL HYPERTENSION: ICD-10-CM

## 2023-02-10 DIAGNOSIS — R33.9 URINARY RETENTION: ICD-10-CM

## 2023-02-10 DIAGNOSIS — E10.22 TYPE 1 DIABETES MELLITUS WITH DIABETIC CHRONIC KIDNEY DISEASE, UNSPECIFIED CKD STAGE (H): Primary | ICD-10-CM

## 2023-02-10 PROCEDURE — 99316 NF DSCHRG MGMT 30 MIN+: CPT | Performed by: NURSE PRACTITIONER

## 2023-02-10 RX ORDER — EMOLLIENT BASE
CREAM (GRAM) TOPICAL DAILY
COMMUNITY
End: 2023-04-20

## 2023-02-10 NOTE — PROGRESS NOTES
"[facility] GERIATRICS DISCHARGE SUMMARY  PATIENT'S NAME: Tc Garcia  YOB: 1940  MEDICAL RECORD NUMBER:  [id number]  [facility]    PRIMARY CARE PROVIDER AND CLINIC RESPONSIBLE AFTER TRANSFER:   Jessika Cordoba, [address] / [place] [address]    [facility] Provider     Transferring providers: Tonya Lynn Haase, APRN CNP, Dr. Mathew Odonnell MD  Recent Hospitalization/ED:  [facility] Hospital stay 1/29/23 to 1/31/23.  Date of SNF Admission: January 31, 2023  Date of SNF (anticipated) Discharge: February 13, 2023  Discharged to: previous independent home  Cognitive Scores: BIMS: 13/15 and Short blessed: 5/28  Physical Function: Ambulating 300 ft with no device  DME: Walker    CODE STATUS/ADVANCE DIRECTIVES DISCUSSION:  No CPR- Do NOT Intubate   ALLERGIES: No known drug allergies    NURSING FACILITY COURSE   Medication Changes/Rationale:     See assessment and plan    Summary of nursing facility stay:   Patient progressed to walking up to 800 feet using a RW and 300 feet with no device, patient independent with aDL's and toileting. The main concern with this patient is management of his DMII, he is un educated on how to treat low blood sugar, he told the nurses when his blood sugar is low he eats a candy bar, education was given on eating high protein foods to help combat extreme fluctuations. DIetician also gave education and information in diabetes management. THe IDT do not recommend patient return home, recommend a higher level of care with medication management, the family has declined recommendations as they say their Dad will not agree to move to [facility] because \"he does what he wants to do\" We will be reporting this patient as a vulnerable adult to the State. Will DC to home with home PT, OT, RN, HHA and SW through [facility]C.     Discharge Medications:  MED REC REQUIRED  Post Medication " Reconciliation Status: medication reconcilation previously completed during another office visit       Current Outpatient Medications   Medication Sig Dispense Refill     acetaminophen (TYLENOL) 325 MG tablet Take 650 mg by mouth every 4 hours as needed for mild pain or fever       carvedilol (COREG) 12.5 MG tablet Take 1 tablet (12.5 mg) by mouth 2 times daily (with meals)       emollient (VANICREAM) external cream Apply topically daily Apply to  ankles/feet topically one time a day for Dry skin       glucagon 1 MG kit 1 mg as needed for low blood sugar       insulin glargine (LANTUS PEN) 100 UNIT/ML pen Inject 12 Units Subcutaneous At Bedtime 15 mL      irbesartan (AVAPRO) 75 MG tablet Take 1 tablet (75 mg) by mouth At Bedtime       melatonin 5 MG tablet Take 5 mg by mouth At Bedtime       tamsulosin (FLOMAX) 0.4 MG capsule Take 1 capsule (0.4 mg) by mouth daily            Controlled medications:   not applicable/none     Past Medical History:   Past Medical History:   Diagnosis Date     Anemia      Anemia of chronic disease 5/14/2020     Back pain      CKD (chronic kidney disease) stage 3, GFR 30-59 ml/min (H)      CRF (chronic renal failure), stage 3  5/14/2020     Fall 11/2019    suffered multiple left rib fractures, C3 and T2 fractures     Mixed hyperlipidemia 7/7/2004     Paroxysmal atrial fibrillation (H)      Personal history of colonic polyps 1972    gets colonoscopy every five years, due in 2006     Pleural effusion on left 11/2019    after multiple rib fractures     Pulmonary hypertension (H) 5/10/2020    Added automatically from request for surgery [id number]     Recurrent Left Pleural effusion -- S/P Pleurex Cath 5/12/20 12/30/2019     Rosacea 1989     Type II or unspecified type diabetes mellitus without mention of complication, not stated as uncontrolled 1999     Unspecified essential hypertension 1994     Physical Exam:   Vitals: BP (!) 150/90   Pulse 76   Temp 98.4  F (36.9  C)   Resp 14   Ht  1.524 m (5')   Wt 69.4 kg (153 lb)   SpO2 95%   BMI 29.88 kg/m    BMI: Body mass index is 29.88 kg/m .  GENERAL APPEARANCE:  Alert, in no distress  ENT:  Mouth and posterior oropharynx normal, moist mucous membranes, [place]  EYES:  EOM, conjunctivae, lids, pupils and irises normal, PERRL  RESP:  respiratory effort and palpation of chest normal, lungs clear to auscultation , no respiratory distress  CV:  Palpation and auscultation of heart done , regular rate and rhythm, no murmur, rub, or gallop, peripheral edema trace+ in LE bilaterally  ABDOMEN:  normal bowel sounds, soft, nontender, no hepatosplenomegaly or other masses  M/S:   patient resting in bed at time of exam  SKIN:  Inspection of skin and subcutaneous tissue baseline  NEURO:   speech wnl  PSYCH:  affect and mood normal     SNF labs: Recent labs in [facility] reviewed by me today.  and   Most Recent 3 CBC's:  Recent Labs   Lab Test 01/30/23  0526 01/29/23  0633 01/25/23  1014 01/24/23  0847   WBC 12.2* 9.9  --  9.9   HGB 12.2* 14.4  --  13.5   MCV 83 84  --  85    264 270 244     Most Recent 3 BMP's:  Recent Labs   Lab Test 01/31/23  1708 01/31/23  1154 01/31/23  1128 01/31/23  0903 01/31/23  0548 01/30/23  0610 01/30/23  0526 01/29/23  0701 01/29/23  0633   NA  --   --   --   --  136  --  136  --  136   POTASSIUM  --   --   --   --  3.6  --  3.7  --  3.5   CHLORIDE  --   --   --   --  100  --  101  --  96*   CO2  --   --   --   --  27  --  26  --  31*   BUN  --   --   --   --  13.2  --  20.6  --  29.4*   CR  --   --   --   --  1.21*  --  1.28*  --  1.68*   ANIONGAP  --   --   --   --  9  --  9  --  9   LLOYD  --   --   --   --  8.4*  --  8.2*  --  9.6   GLC 73 198* 171*   < > 148*   < > 138*   < > 135*    < > = values in this interval not displayed.       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, unspecified CKD stage (H)  (E10.9) Unstable type 1 diabetes mellitus (H)  Comment: acute/ongoing unstable/labile  Plan: blood sugar monitoring per home routine , lantus 12u qhs (decreased from 15 on 2/3/23)   DC sliding scale at discharge as patient will not be able to manage at home  F/u with endocrinology before restarting insulin pump  Goal to avoid hypoglycemia     (I10) Benign essential hypertension  (I48.0) PAF (paroxysmal atrial fibrillation) (H)  (I50.32) Chronic diastolic heart failure (H)  (N17.9) Acute kidney injury (H)  (N18.30) Stage 3 chronic kidney disease, unspecified whether stage 3a or 3b CKD (H)  Comment: acute/ongoing, baseline creat 1.4-1.6  Plan: continue irbesartan 75mg qhs , coreg 12.5mg BID, no diuretic at this time      (N18.30,  D63.1) Anemia in stage 3 chronic kidney disease, unspecified whether stage 3a or 3b CKD (H)  Comment: ongoing  Plan: Hgb 10.3 on 2/6/23     (R33.9) Urinary retention  Comment: resolved  Plan: cho discontinued, PVR < 350cc consistently, continue flomax 0.4mg QD, f/u [facility] urology as OP     (R91.8) Pulmonary nodules  Comment: incidental finding on CXR  Plan: f/u as OP       DISCHARGE PLAN:    Follow up labs: No labs orders/due    Medical Follow Up:      Follow up with primary care provider in 1 weeks    [facility] scheduled appointments:       Discharge Services: Home Care:  Occupational Therapy, Physical Therapy, Registered Nurse and Home Health Aide    Discharge Instructions Verbalized to Patient at Discharge:      None    TOTAL DISCHARGE TIME:   Greater than 30 minutes  Electronically signed by:  Tonya Lynn Haase, APRN CNP

## 2023-02-11 ENCOUNTER — APPOINTMENT (OUTPATIENT)
Dept: CT IMAGING | Facility: CLINIC | Age: 83
End: 2023-02-11
Attending: EMERGENCY MEDICINE
Payer: COMMERCIAL

## 2023-02-11 ENCOUNTER — HOSPITAL ENCOUNTER (EMERGENCY)
Facility: CLINIC | Age: 83
Discharge: HOME | End: 2023-02-11
Attending: EMERGENCY MEDICINE | Admitting: EMERGENCY MEDICINE
Payer: COMMERCIAL

## 2023-02-11 VITALS
TEMPERATURE: 97.2 F | OXYGEN SATURATION: 96 % | BODY MASS INDEX: 31.05 KG/M2 | SYSTOLIC BLOOD PRESSURE: 139 MMHG | DIASTOLIC BLOOD PRESSURE: 68 MMHG | WEIGHT: 159 LBS | RESPIRATION RATE: 18 BRPM | HEART RATE: 64 BPM

## 2023-02-11 DIAGNOSIS — I10 BENIGN ESSENTIAL HYPERTENSION: ICD-10-CM

## 2023-02-11 DIAGNOSIS — R33.9 URINARY RETENTION: ICD-10-CM

## 2023-02-11 DIAGNOSIS — E16.2 HYPOGLYCEMIA: ICD-10-CM

## 2023-02-11 LAB
ALBUMIN SERPL BCG-MCNC: 3.8 G/DL (ref 3.5–5.2)
ALBUMIN UR-MCNC: NEGATIVE MG/DL
ALP SERPL-CCNC: 64 U/L (ref 40–129)
ALT SERPL W P-5'-P-CCNC: 11 U/L (ref 10–50)
ANION GAP SERPL CALCULATED.3IONS-SCNC: 9 MMOL/L (ref 7–15)
APPEARANCE UR: CLEAR
AST SERPL W P-5'-P-CCNC: 22 U/L (ref 10–50)
ATRIAL RATE - MUSE: NORMAL BPM
BASOPHILS # BLD AUTO: 0.1 10E3/UL (ref 0–0.2)
BASOPHILS NFR BLD AUTO: 1 %
BILIRUB SERPL-MCNC: 0.6 MG/DL
BILIRUB UR QL STRIP: NEGATIVE
BUN SERPL-MCNC: 11.6 MG/DL (ref 8–23)
CALCIUM SERPL-MCNC: 9.2 MG/DL (ref 8.8–10.2)
CHLORIDE SERPL-SCNC: 99 MMOL/L (ref 98–107)
COLOR UR AUTO: ABNORMAL
CREAT SERPL-MCNC: 1.15 MG/DL (ref 0.67–1.17)
DEPRECATED HCO3 PLAS-SCNC: 29 MMOL/L (ref 22–29)
DIASTOLIC BLOOD PRESSURE - MUSE: NORMAL MMHG
EOSINOPHIL # BLD AUTO: 0.3 10E3/UL (ref 0–0.7)
EOSINOPHIL NFR BLD AUTO: 2 %
ERYTHROCYTE [DISTWIDTH] IN BLOOD BY AUTOMATED COUNT: 15.5 % (ref 10–15)
GFR SERPL CREATININE-BSD FRML MDRD: 63 ML/MIN/1.73M2
GLUCOSE BLDC GLUCOMTR-MCNC: 113 MG/DL (ref 70–99)
GLUCOSE BLDC GLUCOMTR-MCNC: 161 MG/DL (ref 70–99)
GLUCOSE BLDC GLUCOMTR-MCNC: 163 MG/DL (ref 70–99)
GLUCOSE BLDC GLUCOMTR-MCNC: 165 MG/DL (ref 70–99)
GLUCOSE BLDC GLUCOMTR-MCNC: 202 MG/DL (ref 70–99)
GLUCOSE SERPL-MCNC: 174 MG/DL (ref 70–99)
GLUCOSE UR STRIP-MCNC: NEGATIVE MG/DL
HCT VFR BLD AUTO: 39.3 % (ref 40–53)
HGB BLD-MCNC: 12.3 G/DL (ref 13.3–17.7)
HGB UR QL STRIP: ABNORMAL
IMM GRANULOCYTES # BLD: 0.1 10E3/UL
IMM GRANULOCYTES NFR BLD: 1 %
INTERPRETATION ECG - MUSE: NORMAL
KETONES UR STRIP-MCNC: NEGATIVE MG/DL
LEUKOCYTE ESTERASE UR QL STRIP: NEGATIVE
LYMPHOCYTES # BLD AUTO: 0.6 10E3/UL (ref 0.8–5.3)
LYMPHOCYTES NFR BLD AUTO: 4 %
MCH RBC QN AUTO: 27.7 PG (ref 26.5–33)
MCHC RBC AUTO-ENTMCNC: 31.3 G/DL (ref 31.5–36.5)
MCV RBC AUTO: 89 FL (ref 78–100)
MONOCYTES # BLD AUTO: 0.9 10E3/UL (ref 0–1.3)
MONOCYTES NFR BLD AUTO: 7 %
NEUTROPHILS # BLD AUTO: 12.3 10E3/UL (ref 1.6–8.3)
NEUTROPHILS NFR BLD AUTO: 85 %
NITRATE UR QL: NEGATIVE
NRBC # BLD AUTO: 0 10E3/UL
NRBC BLD AUTO-RTO: 0 /100
P AXIS - MUSE: NORMAL DEGREES
PH UR STRIP: 7.5 [PH] (ref 5–7)
PLATELET # BLD AUTO: 193 10E3/UL (ref 150–450)
POTASSIUM SERPL-SCNC: 3.6 MMOL/L (ref 3.4–5.3)
PR INTERVAL - MUSE: NORMAL MS
PROT SERPL-MCNC: 5.7 G/DL (ref 6.4–8.3)
QRS DURATION - MUSE: 104 MS
QT - MUSE: 422 MS
QTC - MUSE: 445 MS
R AXIS - MUSE: 31 DEGREES
RBC # BLD AUTO: 4.44 10E6/UL (ref 4.4–5.9)
RBC URINE: 24 /HPF
SODIUM SERPL-SCNC: 137 MMOL/L (ref 136–145)
SP GR UR STRIP: 1.01 (ref 1–1.03)
SYSTOLIC BLOOD PRESSURE - MUSE: NORMAL MMHG
T AXIS - MUSE: -21 DEGREES
UROBILINOGEN UR STRIP-MCNC: NORMAL MG/DL
VENTRICULAR RATE- MUSE: 67 BPM
WBC # BLD AUTO: 14.4 10E3/UL (ref 4–11)
WBC URINE: 3 /HPF

## 2023-02-11 PROCEDURE — 96374 THER/PROPH/DIAG INJ IV PUSH: CPT

## 2023-02-11 PROCEDURE — 36415 COLL VENOUS BLD VENIPUNCTURE: CPT | Performed by: EMERGENCY MEDICINE

## 2023-02-11 PROCEDURE — 82962 GLUCOSE BLOOD TEST: CPT

## 2023-02-11 PROCEDURE — 82962 GLUCOSE BLOOD TEST: CPT | Mod: 91

## 2023-02-11 PROCEDURE — 99285 EMERGENCY DEPT VISIT HI MDM: CPT | Mod: 25

## 2023-02-11 PROCEDURE — 80053 COMPREHEN METABOLIC PANEL: CPT | Performed by: EMERGENCY MEDICINE

## 2023-02-11 PROCEDURE — 250N000009 HC RX 250: Performed by: EMERGENCY MEDICINE

## 2023-02-11 PROCEDURE — 51798 US URINE CAPACITY MEASURE: CPT

## 2023-02-11 PROCEDURE — 85004 AUTOMATED DIFF WBC COUNT: CPT | Performed by: EMERGENCY MEDICINE

## 2023-02-11 PROCEDURE — 250N000011 HC RX IP 250 OP 636: Performed by: EMERGENCY MEDICINE

## 2023-02-11 PROCEDURE — 93005 ELECTROCARDIOGRAM TRACING: CPT

## 2023-02-11 PROCEDURE — 70450 CT HEAD/BRAIN W/O DYE: CPT

## 2023-02-11 PROCEDURE — 81001 URINALYSIS AUTO W/SCOPE: CPT | Performed by: EMERGENCY MEDICINE

## 2023-02-11 RX ORDER — ONDANSETRON 2 MG/ML
4 INJECTION INTRAMUSCULAR; INTRAVENOUS ONCE
Status: COMPLETED | OUTPATIENT
Start: 2023-02-11 | End: 2023-02-11

## 2023-02-11 RX ORDER — LIDOCAINE HYDROCHLORIDE 20 MG/ML
JELLY TOPICAL ONCE
Status: COMPLETED | OUTPATIENT
Start: 2023-02-11 | End: 2023-02-11

## 2023-02-11 RX ADMIN — LIDOCAINE HYDROCHLORIDE: 20 JELLY TOPICAL at 05:42

## 2023-02-11 RX ADMIN — ONDANSETRON 4 MG: 2 INJECTION INTRAMUSCULAR; INTRAVENOUS at 06:13

## 2023-02-11 ASSESSMENT — ENCOUNTER SYMPTOMS
VOMITING: 0
FEVER: 0
ABDOMINAL PAIN: 0
NAUSEA: 0
DIARRHEA: 0

## 2023-02-11 ASSESSMENT — ACTIVITIES OF DAILY LIVING (ADL)
ADLS_ACUITY_SCORE: 35

## 2023-02-11 NOTE — ED NOTES
Bladder scan was performed. Over 999 ml was shown. Actual amount was not determined as the bladder scanner only goes to 999 ml.

## 2023-02-11 NOTE — ED TRIAGE NOTES
Brought by EMS from [facility] due to seizure and low blood sugar,initially was 33 mg/dl,given 2 doses of glucagon,he is awake,no weakness.

## 2023-02-11 NOTE — ED PROVIDER NOTES
"  History     Chief Complaint:  Seizures (Low blood sugar)       The history is provided by the patient and the EMS personnel.      Tc Garcia is a 82 year old male with a history of CKD, CRF, atrial fibrillation, hypertension, hyperlipidemia, and DM2 who presents with seizures. Patient was brought to the ED via EMS from [facility] due to seizure like activity and low blood sugar of 33. The patient had a stroke several weeks ago and was recently hospitalized and discharged. The patient states that he was sleeping, woke up to use the bathroom then got back in bed and started feeling warm. He states that the nurse placed an ice pack on his head. He adds that he heard some men talking to him but he \"did not feel like responding to him\". He denies tongue biting, incontinence of urine, nausea, vomiting, diarrhea, fever, cough, abdominal pain. He was given Lantus in the evening. He reports that ever since he was in the hospital his blood sugar has been all over the place. Pruett catheter was removed 5 days ago.     Independent Historian:   EMS   I spoke to staff at [facility] who said his blood sugar was 33.  He was given IM glucagon.  He is confused at this time and minimally responsive.  His blood sugar was still low in the 40s and so given a second dose of glucagon.  Between the first and second dose of glucagon, nurse thought there could have been a seizure as his arms got tense and his eyes were closed but there was not significant shaking and patient did not urinate on himself or bite his tongue.  His blood sugar then started to improve after the second dose of glucagon.  He was given 12 units of Lantus last evening which she has been doing since February 3.  His Lantus was decreased from 15 to 12 units when he had episodes of hypoglycemia on February 3.  He had hyperglycemia on the ninth.  He was given sliding scale insulin with meals.  Nurse notes no recent illness    Review of External Notes: I reviewed the " discharge summary from 1/29/23 where he was admitted for hyperglycemia.    ROS:  Review of Systems   Constitutional: Negative for fever.   Gastrointestinal: Negative for abdominal pain, diarrhea, nausea and vomiting.   All other systems reviewed and are negative.      Allergies:  No Known Drug Allergies     Medications:    Carvedilol   Emollient   Glucagon   Lantus   Avapro   Melatonin 5mg   Tamsulosin     Past Medical History:    Anemia   CKD stage 3   CRF stage 3  Hyperlipidemia   Atrial fibrillation   Colonic polyps  Pleural effusion of left  Rosacea   Diabetes mellitus type 2  Hypertension     Past Surgical History:   Cardioversion   Heart cath  Tonsillectomy   Adenoidectomy   Chest tube drain tunneled   Chest tube placed   Chest tube removed  Cholecystectomy   Herniorrhaphy inguinal       Social History:  Reports that he quit smoking about 15 years ago. His smoking use included cigarettes. He started smoking about 55 years ago. He has a 8.00 pack-year smoking history. He has never used smokeless tobacco. He reports that he does not currently use alcohol after a past usage of about 35.0 standard drinks per week. He reports that he does not currently use drugs.  Presents via EMS   Presents alone  PCP: Jessika Cordoba     Physical Exam     Patient Vitals for the past 24 hrs:   BP Temp Temp src Pulse Resp SpO2 Weight   02/11/23 0619 135/77 -- -- 51 16 96 % --   02/11/23 0537 (!) 163/145 -- -- 56 11 97 % --   02/11/23 0525 -- 97.2  F (36.2  C) Temporal -- -- -- --   02/11/23 0449 -- (!) 95.7  F (35.4  C) Rectal -- -- -- --   02/11/23 0440 (!) 196/107 -- -- 76 (!) 37 96 % --   02/11/23 0424 -- (!) 96.5  F (35.8  C) Temporal -- -- -- --   02/11/23 0349 (!) 170/113 (!) 92  F (33.3  C) Axillary 68 18 95 % 72.1 kg (159 lb)        Physical Exam  General: Sitting up in bed  Eyes:  The pupils are equal and round    Conjunctivae and sclerae are normal  ENT:    Wearing a mask  Neck:  Normal range of  motion  CV:  Regular rate, regular rhythm     Skin warm and well perfused   Resp:  Non labored breathing on room air    No tachypnea    No cough heard, lungs clear bilaterally  GI:  Abdomen is soft, there is no rigidity    No distension    No rebound tenderness     Mild tenderness over suprapubic area and fullness on this area  MS:  Normal muscular tone  Skin:  No rash or acute skin lesions noted  Neuro:   Awake, alert.      Speech is normal and fluent.    Face is symmetric.     Moves all extremities equally  Psych: Normal affect.  Appropriate interactions.    Emergency Department Course   ECG  ECG taken at 0441, ECG read at 0443  Atrial fibrillation   Incomplete right bundle branch block   Nonspecific T wave abnormality   Abnormal ECG    No change as compared to prior, dated 1/29/23.  Rate 67 bpm. KS interval * ms. QRS duration 104 ms. QT/QTc 422/445 ms. P-R-T axes * 31 -21.     Imaging:  CT Head w/o Contrast   Final Result   IMPRESSION:   1.  Interval evolutionary change of the hemorrhage in the right periatrial white matter with fading of hyperdense hemorrhage. No new hemorrhage or mass effect.      2.  Remainder stable.               Report per radiology    Laboratory:  Labs Ordered and Resulted from Time of ED Arrival to Time of ED Departure   GLUCOSE BY METER - Abnormal       Result Value    GLUCOSE BY METER POCT 113 (*)    COMPREHENSIVE METABOLIC PANEL - Abnormal    Sodium 137      Potassium 3.6      Chloride 99      Carbon Dioxide (CO2) 29      Anion Gap 9      Urea Nitrogen 11.6      Creatinine 1.15      Calcium 9.2      Glucose 174 (*)     Alkaline Phosphatase 64      AST 22      ALT 11      Protein Total 5.7 (*)     Albumin 3.8      Bilirubin Total 0.6      GFR Estimate 63     ROUTINE UA WITH MICROSCOPIC REFLEX TO CULTURE - Abnormal    Color Urine Straw      Appearance Urine Clear      Glucose Urine Negative      Bilirubin Urine Negative      Ketones Urine Negative      Specific Gravity Urine 1.009       Blood Urine Moderate (*)     pH Urine 7.5 (*)     Protein Albumin Urine Negative      Urobilinogen Urine Normal      Nitrite Urine Negative      Leukocyte Esterase Urine Negative      RBC Urine 24 (*)     WBC Urine 3     CBC WITH PLATELETS AND DIFFERENTIAL - Abnormal    WBC Count 14.4 (*)     RBC Count 4.44      Hemoglobin 12.3 (*)     Hematocrit 39.3 (*)     MCV 89      MCH 27.7      MCHC 31.3 (*)     RDW 15.5 (*)     Platelet Count 193      % Neutrophils 85      % Lymphocytes 4      % Monocytes 7      % Eosinophils 2      % Basophils 1      % Immature Granulocytes 1      NRBCs per 100 WBC 0      Absolute Neutrophils 12.3 (*)     Absolute Lymphocytes 0.6 (*)     Absolute Monocytes 0.9      Absolute Eosinophils 0.3      Absolute Basophils 0.1      Absolute Immature Granulocytes 0.1      Absolute NRBCs 0.0     GLUCOSE BY METER - Abnormal    GLUCOSE BY METER POCT 202 (*)    GLUCOSE BY METER - Abnormal    GLUCOSE BY METER POCT 165 (*)    GLUCOSE BY METER - Abnormal    GLUCOSE BY METER POCT 163 (*)    GLUCOSE MONITOR NURSING POCT      Emergency Department Course & Assessments:  Interventions:  Medications   lidocaine (XYLOCAINE) 2 % external gel ( Topical Given 2/11/23 0542)   ondansetron (ZOFRAN) injection 4 mg (4 mg Intravenous Given 2/11/23 0613)      Independent Interpretation (X-rays, CTs, rhythm strip):  I independently reviewed CT.     Consultations/Discussion of Management or Tests:  0419 I spoke with [facility] staff regarding the patient     Assessments:  ED Course as of 02/11/23 0658   Sat Feb 11, 2023   0408 I obtained history and examined the patient as noted above    0427 I rechecked the patient.   0543 I rechecked the patient. Patient had catheter placed and is feeling better   0607 I rechecked the patient     Disposition:  The patient was discharged to home.     Impression & Plan    Medical Decision Making:  Tc Garcia is a 82-year-old male who presented to the emergency department with  hypoglycemia.  Staff at facility was concerned that there could have been seizure activity when his glucose was low.  I spoke to the staff and I am not entirely convinced that it was a seizure though certainly could be possible given that his sugar was so low.  Given this, CT head was done which shows stable findings.  Nothing new.  He did have recent intracranial hemorrhage in January.  He has no focal symptoms on exam and doubt stroke.  He has no fever to suggest infectious process.  His blood sugars as well as his blood pressure have been fairly labile over the last month.  He has had episodes of both hypoglycemia and hyperglycemia.  He was complaining of suprapubic pain.  He had over 1000 cc of urine in his bladder.  Pruett catheter was placed and he had significant relief with this and resolution of the pain.  Urine analysis shows no evidence of infection.  Pruett catheter will need to remain in and will need to have outpatient voiding trial.  He is already on Flomax.  He actually just got his prior Pruett catheter removed about 5 days ago.  He has no respiratory symptoms and so pneumonia seems unlikely.  EKG shows atrial fibrillation which he has a history of.  His blood sugar remained stable in the emergency department.  Given this, do think he can be discharged and his facility can keep a close eye on his blood sugar.  I recommended decreasing the Lantus to 10 units.  He may need further adjustments of Lantus given his labile blood sugars.  They can continue sliding scale.  He will also need urology follow-up as an outpatient and voiding trial.    Diagnosis:    ICD-10-CM    1. Hypoglycemia  E16.2       2. Benign essential hypertension  I10       3. Urinary retention  R33.9         Scribe Disclosure:  I, Genet Negron, am serving as a scribe at 4:01 AM on 2/11/2023 to document services personally performed by Shea Horta MD based on my observations and the provider's statements to me.     2/11/2023    Shea Horta MD Goertz, Maria Kristine, MD  02/11/23 0728

## 2023-02-11 NOTE — ED NOTES
Complaints abdominal cramps,scanty urine output ,bladder scan with residual output around 1000 ml,informed ED,advised cho cath insertion.  Coudette type 16 fr inserted gently and under aseptic technique,tolerated procedure.secured.  Noted with urine,pinkish,observed.

## 2023-02-11 NOTE — ED NOTES
Sepsis alert ,informed ED physician,no need to do lactic acid and  accordingly and no additional order .

## 2023-02-11 NOTE — ED NOTES
Bed: ED20  Expected date: 2/11/23  Expected time: 3:35 AM  Means of arrival: Ambulance  Comments:  Sai 536 82M seizure; BS was 33; now 140

## 2023-02-13 ENCOUNTER — TRANSITIONAL CARE UNIT VISIT (OUTPATIENT)
Dept: GERIATRICS | Facility: CLINIC | Age: 83
End: 2023-02-13
Payer: COMMERCIAL

## 2023-02-13 VITALS
WEIGHT: 154.1 LBS | OXYGEN SATURATION: 92 % | RESPIRATION RATE: 18 BRPM | TEMPERATURE: 98.1 F | SYSTOLIC BLOOD PRESSURE: 136 MMHG | BODY MASS INDEX: 30.25 KG/M2 | HEART RATE: 80 BPM | HEIGHT: 60 IN | DIASTOLIC BLOOD PRESSURE: 70 MMHG

## 2023-02-13 DIAGNOSIS — I48.0 PAF (PAROXYSMAL ATRIAL FIBRILLATION) (H): ICD-10-CM

## 2023-02-13 DIAGNOSIS — E10.22 TYPE 1 DIABETES MELLITUS WITH DIABETIC CHRONIC KIDNEY DISEASE, UNSPECIFIED CKD STAGE (H): Primary | ICD-10-CM

## 2023-02-13 DIAGNOSIS — I10 BENIGN ESSENTIAL HYPERTENSION: ICD-10-CM

## 2023-02-13 DIAGNOSIS — N18.30 STAGE 3 CHRONIC KIDNEY DISEASE, UNSPECIFIED WHETHER STAGE 3A OR 3B CKD (H): ICD-10-CM

## 2023-02-13 DIAGNOSIS — I50.32 CHRONIC DIASTOLIC HEART FAILURE (H): ICD-10-CM

## 2023-02-13 DIAGNOSIS — R53.81 PHYSICAL DECONDITIONING: ICD-10-CM

## 2023-02-13 DIAGNOSIS — I61.5 RIGHT-SIDED NONTRAUMATIC INTRAVENTRICULAR INTRACEREBRAL HEMORRHAGE (H): ICD-10-CM

## 2023-02-13 DIAGNOSIS — E10.9: ICD-10-CM

## 2023-02-13 DIAGNOSIS — D63.1 ANEMIA IN STAGE 3 CHRONIC KIDNEY DISEASE, UNSPECIFIED WHETHER STAGE 3A OR 3B CKD (H): ICD-10-CM

## 2023-02-13 DIAGNOSIS — N18.30 ANEMIA IN STAGE 3 CHRONIC KIDNEY DISEASE, UNSPECIFIED WHETHER STAGE 3A OR 3B CKD (H): ICD-10-CM

## 2023-02-13 DIAGNOSIS — R33.9 URINARY RETENTION: ICD-10-CM

## 2023-02-13 PROCEDURE — 99309 SBSQ NF CARE MODERATE MDM 30: CPT | Performed by: NURSE PRACTITIONER

## 2023-02-13 NOTE — LETTER
"    2/13/2023        RE: Tc Garcia  [address] [address] [id number]        [facility] GERIATRICS    Chief Complaint   Patient presents with     RECHECK     HPI:  Tc Garcia is a 82 year old  (1940), who is being seen today for an episodic care visit at: [facility]) [25]. Today's concern is: Patient sent to ED on 2/11/23 due to hypoglycemia blood sugar 33, given IM glucagon X 1 blood sugar increased to 40 and then administered IM glucagon X 1 blood sugar started to improve but nursing thought maybe he had a seizure because his arm tensed up and his eyes were closed shut, so he was sent to ED for evaluation, patient stable during ED visit, c/o suprapubic pain, noted to have > 1000cc in bladder so a cho catheter was placed and left in at discharge.   Today on exam patient is resting in bed, denies pain or discomfort, discussed ED visit and low blood sugar, patient Diabetes is very brittle blood sugars are labile and difficult to control, lantus has been adjusted down but patient continues to have episodes of hypoglycemia, no infection found in ED, current blood sugars range 179-411 BP stable 136/70, 167/72, 172/95 with HR range 70-80 range. DIscussed discharge disposition, patient was scheduled to go home per his insistence today. Will hold the discharge, discussed safety at home, patient states he and his wife \"realize that it is not safe for him to return home\" but when I discussed [facility] placement patient states his wife does not wish to move to half-way.   Discussed with nurse manager who states daughter did call this morning voicing concerns about low blood sugar and safety returning home    Allergies, and PMH/PSH reviewed in [facility] today.  REVIEW OF SYSTEMS:  10 point ROS of systems including Constitutional, Eyes, Respiratory, Cardiovascular, Gastroenterology, Genitourinary, Integumentary, Musculoskeletal, Psychiatric were all negative except for pertinent positives noted in my " HPI.    Objective:   /70   Pulse 80   Temp 98.1  F (36.7  C)   Resp 18   Ht 1.524 m (5')   Wt 69.9 kg (154 lb 1.6 oz)   SpO2 92%   BMI 30.10 kg/m    GENERAL APPEARANCE:  Alert, in no distress  ENT:  Mouth and posterior oropharynx normal, moist mucous membranes, [place]  EYES:  EOM, conjunctivae, lids, pupils and irises normal, PERRL  RESP:  respiratory effort and palpation of chest normal, lungs clear to auscultation , no respiratory distress  CV:  Palpation and auscultation of heart done , regular rate and rhythm, no murmur, rub, or gallop, no edema  ABDOMEN:  normal bowel sounds, soft, nontender, no hepatosplenomegaly or other masses  M/S:   patient resting in bed  SKIN:  Inspection of skin and subcutaneous tissue baseline  NEURO:   speech wnl  PSYCH:  affect and mood normal    Recent labs in [facility] reviewed by me today.  and   Most Recent 3 CBC's:Recent Labs   Lab Test 02/11/23  0418 01/30/23  0526 01/29/23  0633   WBC 14.4* 12.2* 9.9   HGB 12.3* 12.2* 14.4   MCV 89 83 84    233 264     Most Recent 3 BMP's:Recent Labs   Lab Test 02/11/23  0810 02/11/23  0712 02/11/23  0605 02/11/23  0508 02/11/23  0418 01/31/23  0903 01/31/23  0548 01/30/23  0610 01/30/23  0526   NA  --   --   --   --  137  --  136  --  136   POTASSIUM  --   --   --   --  3.6  --  3.6  --  3.7   CHLORIDE  --   --   --   --  99  --  100  --  101   CO2  --   --   --   --  29  --  27  --  26   BUN  --   --   --   --  11.6  --  13.2  --  20.6   CR  --   --   --   --  1.15  --  1.21*  --  1.28*   ANIONGAP  --   --   --   --  9  --  9  --  9   LLOYD  --   --   --   --  9.2  --  8.4*  --  8.2*   * 163* 165*   < > 174*   < > 148*   < > 138*    < > = values in this interval not displayed.       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, unspecified CKD stage (H)  (E10.9) Unstable type 1 diabetes mellitus (H)  Comment: acute/ongoing unstable/labile  Plan: blood sugar monitoring QID and prn, lantus 12u qhs (decreased from 15 on 2/3/23)  No further sliding scale coverage   dietician to follow  Goal to avoid hypoglycemia     (I10) Benign essential hypertension  (I48.0) PAF (paroxysmal atrial fibrillation) (H)  (I50.32) Chronic diastolic heart failure (H)  (N17.9) Acute kidney injury (H)  (N18.30) Stage 3 chronic kidney disease, unspecified whether stage 3a or 3b CKD (H)  Comment: acute/ongoing, baseline creat 1.4-1.6, no change  Plan: BMP follow, goal SBP < 160, continue irbesartan 75mg qhs , coreg 12.5mg BID, no diuretic at this time      (N18.30,  D63.1) Anemia in stage 3 chronic kidney disease, unspecified whether stage 3a or 3b CKD (H)  Comment: chronic/ongoing, no change  Plan: Hgb follow, Hgb 10.3 on 2/6/23     (R33.9) Urinary retention  Comment: acute/ongoing  Plan: cho placed in ED due to urinary retention, continue flomax 0.4mg QD, f/u [facility] urology as OP     (R91.8) Pulmonary nodules  Comment: incidental finding on CXR  Plan: f/u as OP    MED REC REQUIRED  Post Medication Reconciliation Status: discharge medications reconciled and changed, per note/orders      Orders:  No new orders      Electronically signed by: Tonya Lynn Haase, APRN CNP             Sincerely,        Tonya Lynn Haase, APRN CNP

## 2023-02-13 NOTE — PROGRESS NOTES
"[facility] GERIATRICS    Chief Complaint   Patient presents with     RECHECK     HPI:  Tc Garcia is a 82 year old  (1940), who is being seen today for an episodic care visit at: [facility]) [25]. Today's concern is: Patient sent to ED on 2/11/23 due to hypoglycemia blood sugar 33, given IM glucagon X 1 blood sugar increased to 40 and then administered IM glucagon X 1 blood sugar started to improve but nursing thought maybe he had a seizure because his arm tensed up and his eyes were closed shut, so he was sent to ED for evaluation, patient stable during ED visit, c/o suprapubic pain, noted to have > 1000cc in bladder so a cho catheter was placed and left in at discharge.   Today on exam patient is resting in bed, denies pain or discomfort, discussed ED visit and low blood sugar, patient Diabetes is very brittle blood sugars are labile and difficult to control, lantus has been adjusted down but patient continues to have episodes of hypoglycemia, no infection found in ED, current blood sugars range 179-411 BP stable 136/70, 167/72, 172/95 with HR range 70-80 range. DIscussed discharge disposition, patient was scheduled to go home per his insistence today. Will hold the discharge, discussed safety at home, patient states he and his wife \"realize that it is not safe for him to return home\" but when I discussed [facility] placement patient states his wife does not wish to move to [facility].   Discussed with nurse manager who states daughter did call this morning voicing concerns about low blood sugar and safety returning home    Allergies, and PMH/PSH reviewed in [facility] today.  REVIEW OF SYSTEMS:  10 point ROS of systems including Constitutional, Eyes, Respiratory, Cardiovascular, Gastroenterology, Genitourinary, Integumentary, Musculoskeletal, Psychiatric were all negative except for pertinent positives noted in my HPI.    Objective:   /70   Pulse 80   Temp 98.1  F (36.7  C)   Resp 18   Ht 1.524 " m (5')   Wt 69.9 kg (154 lb 1.6 oz)   SpO2 92%   BMI 30.10 kg/m    GENERAL APPEARANCE:  Alert, in no distress  ENT:  Mouth and posterior oropharynx normal, moist mucous membranes, [place]  EYES:  EOM, conjunctivae, lids, pupils and irises normal, PERRL  RESP:  respiratory effort and palpation of chest normal, lungs clear to auscultation , no respiratory distress  CV:  Palpation and auscultation of heart done , regular rate and rhythm, no murmur, rub, or gallop, no edema  ABDOMEN:  normal bowel sounds, soft, nontender, no hepatosplenomegaly or other masses  M/S:   patient resting in bed  SKIN:  Inspection of skin and subcutaneous tissue baseline  NEURO:   speech wnl  PSYCH:  affect and mood normal    Recent labs in [facility] reviewed by me today.  and   Most Recent 3 CBC's:Recent Labs   Lab Test 02/11/23  0418 01/30/23  0526 01/29/23  0633   WBC 14.4* 12.2* 9.9   HGB 12.3* 12.2* 14.4   MCV 89 83 84    233 264     Most Recent 3 BMP's:Recent Labs   Lab Test 02/11/23  0810 02/11/23  0712 02/11/23  0605 02/11/23  0508 02/11/23  0418 01/31/23  0903 01/31/23  0548 01/30/23  0610 01/30/23  0526   NA  --   --   --   --  137  --  136  --  136   POTASSIUM  --   --   --   --  3.6  --  3.6  --  3.7   CHLORIDE  --   --   --   --  99  --  100  --  101   CO2  --   --   --   --  29  --  27  --  26   BUN  --   --   --   --  11.6  --  13.2  --  20.6   CR  --   --   --   --  1.15  --  1.21*  --  1.28*   ANIONGAP  --   --   --   --  9  --  9  --  9   LLOYD  --   --   --   --  9.2  --  8.4*  --  8.2*   * 163* 165*   < > 174*   < > 148*   < > 138*    < > = values in this interval not displayed.       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, unspecified CKD stage (H)  (E10.9) Unstable type 1 diabetes mellitus (H)  Comment: acute/ongoing unstable/labile  Plan: blood sugar monitoring QID and prn, lantus 12u qhs (decreased from 15 on 2/3/23)  No further sliding scale coverage   dietician to follow  Goal to avoid hypoglycemia     (I10) Benign essential hypertension  (I48.0) PAF (paroxysmal atrial fibrillation) (H)  (I50.32) Chronic diastolic heart failure (H)  (N17.9) Acute kidney injury (H)  (N18.30) Stage 3 chronic kidney disease, unspecified whether stage 3a or 3b CKD (H)  Comment: acute/ongoing, baseline creat 1.4-1.6, no change  Plan: BMP follow, goal SBP < 160, continue irbesartan 75mg qhs , coreg 12.5mg BID, no diuretic at this time      (N18.30,  D63.1) Anemia in stage 3 chronic kidney disease, unspecified whether stage 3a or 3b CKD (H)  Comment: chronic/ongoing, no change  Plan: Hgb follow, Hgb 10.3 on 2/6/23     (R33.9) Urinary retention  Comment: acute/ongoing  Plan: cho placed in ED due to urinary retention, continue flomax 0.4mg QD, f/u wt urology as OP     (R91.8) Pulmonary nodules  Comment: incidental finding on CXR  Plan: f/u as OP    MED REC REQUIRED  Post Medication Reconciliation Status: discharge medications reconciled and changed, per note/orders      Orders:  No new orders      Electronically signed by: Tonya Lynn Haase, APRN CNP

## 2023-02-14 ENCOUNTER — TELEPHONE (OUTPATIENT)
Dept: NEUROLOGY | Facility: CLINIC | Age: 83
End: 2023-02-14
Payer: COMMERCIAL

## 2023-02-14 NOTE — TELEPHONE ENCOUNTER
Schedule Patient With: Specific Stroke TAMAR Only   Specify Provider: Tosin Link PA-C   Specific Diagnosis: f/u for IPH and cardioembolic ischemic infarcts in 4 weeks; to discuss restarting anticoagulation at this visit   Contact: Patient   Scheduling Instructions: [employer] will call you to coordinate care as prescribed by your provider. If you don t hear from a representative within 2 business days, please call [phone].

## 2023-02-14 NOTE — TELEPHONE ENCOUNTER
Oanh Fishman, RN  P Care Coordination Stroke Team  Pattie, I see the patient is at [facility] which I believe they have been able to facilitate virtual visits. Can you speak with the TCU and preferably pts spouse as well to coordinate a follow up with the APPs in the next couple weeks?

## 2023-02-14 NOTE — TELEPHONE ENCOUNTER
Spoke to Radha, patients spouse. She states patient is returning home from TCU this week, Needed telephone visit. Scheduled in open in-person slot 13 days out on 2/27/23- confirmed. Routing to provider pool as an FYI  .   MONICA ALLISON, CMA

## 2023-02-16 ENCOUNTER — TRANSITIONAL CARE UNIT VISIT (OUTPATIENT)
Dept: GERIATRICS | Facility: CLINIC | Age: 83
End: 2023-02-16
Payer: COMMERCIAL

## 2023-02-16 DIAGNOSIS — D63.1 ANEMIA IN STAGE 3 CHRONIC KIDNEY DISEASE, UNSPECIFIED WHETHER STAGE 3A OR 3B CKD (H): ICD-10-CM

## 2023-02-16 DIAGNOSIS — I61.5 RIGHT-SIDED NONTRAUMATIC INTRAVENTRICULAR INTRACEREBRAL HEMORRHAGE (H): ICD-10-CM

## 2023-02-16 DIAGNOSIS — E10.9: ICD-10-CM

## 2023-02-16 DIAGNOSIS — E10.22 TYPE 1 DIABETES MELLITUS WITH DIABETIC CHRONIC KIDNEY DISEASE, UNSPECIFIED CKD STAGE (H): Primary | ICD-10-CM

## 2023-02-16 DIAGNOSIS — R91.8 PULMONARY NODULES: ICD-10-CM

## 2023-02-16 DIAGNOSIS — N18.30 STAGE 3 CHRONIC KIDNEY DISEASE, UNSPECIFIED WHETHER STAGE 3A OR 3B CKD (H): ICD-10-CM

## 2023-02-16 DIAGNOSIS — I48.0 PAF (PAROXYSMAL ATRIAL FIBRILLATION) (H): ICD-10-CM

## 2023-02-16 DIAGNOSIS — I50.32 CHRONIC DIASTOLIC HEART FAILURE (H): ICD-10-CM

## 2023-02-16 DIAGNOSIS — I10 BENIGN ESSENTIAL HYPERTENSION: ICD-10-CM

## 2023-02-16 DIAGNOSIS — N18.30 ANEMIA IN STAGE 3 CHRONIC KIDNEY DISEASE, UNSPECIFIED WHETHER STAGE 3A OR 3B CKD (H): ICD-10-CM

## 2023-02-16 DIAGNOSIS — R41.89 COGNITIVE IMPAIRMENT: ICD-10-CM

## 2023-02-16 DIAGNOSIS — I61.5 NONTRAUMATIC INTRAVENTRICULAR INTRACEREBRAL HEMORRHAGE, UNSPECIFIED LATERALITY (H): ICD-10-CM

## 2023-02-16 DIAGNOSIS — R53.81 PHYSICAL DECONDITIONING: ICD-10-CM

## 2023-02-16 DIAGNOSIS — R33.9 URINARY RETENTION: ICD-10-CM

## 2023-02-16 PROCEDURE — 99309 SBSQ NF CARE MODERATE MDM 30: CPT | Performed by: NURSE PRACTITIONER

## 2023-02-16 NOTE — LETTER
2/16/2023        RE: Tc Garcia  [address] [facility] [address]        [facility] GERIATRICS    Chief Complaint   Patient presents with     RECHECK     HPI:  Tc Garcia is a 83 year old  (1940), who is being seen today for an episodic care visit at: [facility]) [25]. Today's concern is:   Intercerebral hemorrage: at time of exam patient is resting in bed, denies pain, headache, N/V, in therapy patient is walking > 800 feet using a 4ww with SBA most ADL's are SBA, cognitive testing shows a mocha of 24/30 mild cognitive impairment  DMI: Patient sent to ED 2/11 early morning due to hypoglycemia which is ongoing concern for patient despite stable blood sugar levels during the day and eating an evening snack   Blood suger AM: 191, 118, 266, noon: 423, 368, 350 PM: 166, 325, 124 and hs: 106, 297, 358  Discussed lability of blood sugar, need for further education on diet and DMI management, NP and IDT team do not feel it is safe for patient to go home due to lability in blood sugars and dangerous hypoglycemic episodes. Discussed with with patient  HTN/PAF/CHF: /86, 183/90, 197/83 with HR 60 range, admit weight 147lbs and current weight 153.2lbs, denies SOB, CP, palpitations  Anemia: Hgb 2/6/2310.3  Urinary retention: has cho catheter in place, placed in ED due to urinary retention, cho catheter was successfully discontinued prior to ED visit and patient was voiding without difficulty, PVR were < 350. Will continue cho and refer to urology  Allergies, and PMH/PSH reviewed in EPIC today.  REVIEW OF SYSTEMS:  10 point ROS of systems including Constitutional, Eyes, Respiratory, Cardiovascular, Gastroenterology, Genitourinary, Integumentary, Musculoskeletal, Psychiatric were all negative except for pertinent positives noted in my HPI.    Objective:   BP (!) 163/76   Pulse 76   Temp 98.5  F (36.9  C)   Resp 16   Ht 1.524 m (5')   Wt 69.9 kg (154 lb)   SpO2 94%    BMI 30.08 kg/m    GENERAL APPEARANCE:  Alert, in no distress, thin  ENT:  Mouth and posterior oropharynx normal, moist mucous membranes, [place]  EYES:  EOM, conjunctivae, lids, pupils and irises normal, PERRL  RESP:  respiratory effort and palpation of chest normal, lungs clear to auscultation , no respiratory distress  CV:  Palpation and auscultation of heart done , regular rate and rhythm, no murmur, rub, or gallop, no edema  ABDOMEN:  normal bowel sounds, soft, nontender, no hepatosplenomegaly or other masses  M/S:   patient resting in bed  SKIN:  Inspection of skin and subcutaneous tissue baseline  NEURO:   speech wnl  PSYCH:  affect and mood normal    Recent labs in [facility] reviewed by me today.  and   Most Recent 3 CBC's:Recent Labs   Lab Test 02/11/23  0418 01/30/23  0526 01/29/23  0633   WBC 14.4* 12.2* 9.9   HGB 12.3* 12.2* 14.4   MCV 89 83 84    233 264     Most Recent 3 BMP's:Recent Labs   Lab Test 02/11/23  0810 02/11/23  0712 02/11/23  0605 02/11/23  0508 02/11/23  0418 01/31/23  0903 01/31/23  0548 01/30/23  0610 01/30/23  0526   NA  --   --   --   --  137  --  136  --  136   POTASSIUM  --   --   --   --  3.6  --  3.6  --  3.7   CHLORIDE  --   --   --   --  99  --  100  --  101   CO2  --   --   --   --  29  --  27  --  26   BUN  --   --   --   --  11.6  --  13.2  --  20.6   CR  --   --   --   --  1.15  --  1.21*  --  1.28*   ANIONGAP  --   --   --   --  9  --  9  --  9   LLOYD  --   --   --   --  9.2  --  8.4*  --  8.2*   * 163* 165*   < > 174*   < > 148*   < > 138*    < > = values in this interval not displayed.       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    (I10) Benign essential hypertension  (I48.0) PAF (paroxysmal atrial fibrillation) (H)  (I50.32) Chronic diastolic heart failure (H)  (N17.9) Acute kidney injury (H)  (N18.30) Stage 3 chronic kidney disease, unspecified whether stage 3a or 3b CKD (H)  Comment: acute/ongoing, baseline creat 1.4-1.6, no change  Plan: BMP follow, goal SBP < 160, continue irbesartan 75mg qhs , coreg 12.5mg BID, no diuretic at this time      (N18.30,  D63.1) Anemia in stage 3 chronic kidney disease, unspecified whether stage 3a or 3b CKD (H)  Comment: chronic/ongoing, no change  Plan: Hgb follow, Hgb 10.3 on 2/6/23     (R33.9) Urinary retention  Comment: acute/ongoing  Plan: cho placed in ED on 2/11/23 due to urinary retention, continue flomax 0.4mg QD, f/u [facility] urology as OP     (R91.8) Pulmonary nodules  Comment: incidental finding on CXR  Plan: f/u as OP    MED REC REQUIRED  Post Medication Reconciliation Status: medication reconcilation previously completed during another office visit      Orders:  No new orders today      Electronically signed by: Tonya Lynn Haase, APRN CNP             Sincerely,        Tonya Lynn Haase, APRN CNP

## 2023-02-17 VITALS
WEIGHT: 154 LBS | SYSTOLIC BLOOD PRESSURE: 163 MMHG | HEIGHT: 60 IN | TEMPERATURE: 98.5 F | DIASTOLIC BLOOD PRESSURE: 76 MMHG | OXYGEN SATURATION: 94 % | BODY MASS INDEX: 30.23 KG/M2 | HEART RATE: 76 BPM | RESPIRATION RATE: 16 BRPM

## 2023-02-17 NOTE — PROGRESS NOTES
[facility] GERIATRICS    Chief Complaint   Patient presents with     RECHECK     HPI:  Tc Garcia is a 83 year old  (1940), who is being seen today for an episodic care visit at: [facility]) [25]. Today's concern is:   Intercerebral hemorrage: at time of exam patient is resting in bed, denies pain, headache, N/V, in therapy patient is walking > 800 feet using a 4ww with SBA most ADL's are SBA, cognitive testing shows a mocha of 24/30 mild cognitive impairment  DMI: Patient sent to ED 2/11 early morning due to hypoglycemia which is ongoing concern for patient despite stable blood sugar levels during the day and eating an evening snack   Blood suger AM: 191, 118, 266, noon: 423, 368, 350 PM: 166, 325, 124 and hs: 106, 297, 358  Discussed lability of blood sugar, need for further education on diet and DMI management, NP and IDT team do not feel it is safe for patient to go home due to lability in blood sugars and dangerous hypoglycemic episodes. Discussed with with patient  HTN/PAF/CHF: /86, 183/90, 197/83 with HR 60 range, admit weight 147lbs and current weight 153.2lbs, denies SOB, CP, palpitations  Anemia: Hgb 2/6/2310.3  Urinary retention: has cho catheter in place, placed in ED due to urinary retention, cho catheter was successfully discontinued prior to ED visit and patient was voiding without difficulty, PVR were < 350. Will continue cho and refer to urology  Allergies, and PMH/PSH reviewed in [facility] today.  REVIEW OF SYSTEMS:  10 point ROS of systems including Constitutional, Eyes, Respiratory, Cardiovascular, Gastroenterology, Genitourinary, Integumentary, Musculoskeletal, Psychiatric were all negative except for pertinent positives noted in my HPI.    Objective:   BP (!) 163/76   Pulse 76   Temp 98.5  F (36.9  C)   Resp 16   Ht 1.524 m (5')   Wt 69.9 kg (154 lb)   SpO2 94%   BMI 30.08 kg/m    GENERAL APPEARANCE:  Alert, in no distress, thin  ENT:  Mouth and posterior  oropharynx normal, moist mucous membranes, [place]  EYES:  EOM, conjunctivae, lids, pupils and irises normal, PERRL  RESP:  respiratory effort and palpation of chest normal, lungs clear to auscultation , no respiratory distress  CV:  Palpation and auscultation of heart done , regular rate and rhythm, no murmur, rub, or gallop, no edema  ABDOMEN:  normal bowel sounds, soft, nontender, no hepatosplenomegaly or other masses  M/S:   patient resting in bed  SKIN:  Inspection of skin and subcutaneous tissue baseline  NEURO:   speech wnl  PSYCH:  affect and mood normal    Recent labs in [facility] reviewed by me today.  and   Most Recent 3 CBC's:Recent Labs   Lab Test 02/11/23  0418 01/30/23  0526 01/29/23  0633   WBC 14.4* 12.2* 9.9   HGB 12.3* 12.2* 14.4   MCV 89 83 84    233 264     Most Recent 3 BMP's:Recent Labs   Lab Test 02/11/23  0810 02/11/23  0712 02/11/23  0605 02/11/23  0508 02/11/23  0418 01/31/23  0903 01/31/23  0548 01/30/23  0610 01/30/23  0526   NA  --   --   --   --  137  --  136  --  136   POTASSIUM  --   --   --   --  3.6  --  3.6  --  3.7   CHLORIDE  --   --   --   --  99  --  100  --  101   CO2  --   --   --   --  29  --  27  --  26   BUN  --   --   --   --  11.6  --  13.2  --  20.6   CR  --   --   --   --  1.15  --  1.21*  --  1.28*   ANIONGAP  --   --   --   --  9  --  9  --  9   LLOYD  --   --   --   --  9.2  --  8.4*  --  8.2*   * 163* 165*   < > 174*   < > 148*   < > 138*    < > = values in this interval not displayed.       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, unspecified CKD stage (H)  (E10.9) Unstable type 1 diabetes mellitus (H)  Comment: acute/ongoing unstable/labile  Plan: blood sugar monitoring QID and prn, lantus 10u qhs (decreased from 15 on 2/3/23 and again from 12 on 2/11 in ED)  No further sliding scale coverage   dietician to follow  Goal to avoid hypoglycemia     (R41.89) cognitive impairment  Ongoing  Plan: OT eval done Mocha 24/30, SW helping family/patient look for appropriate and safe discharge facility, [facility]    (I10) Benign essential hypertension  (I48.0) PAF (paroxysmal atrial fibrillation) (H)  (I50.32) Chronic diastolic heart failure (H)  (N17.9) Acute kidney injury (H)  (N18.30) Stage 3 chronic kidney disease, unspecified whether stage 3a or 3b CKD (H)  Comment: acute/ongoing, baseline creat 1.4-1.6, no change  Plan: BMP follow, goal SBP < 160, continue irbesartan 75mg qhs , coreg 12.5mg BID, no diuretic at this time      (N18.30,  D63.1) Anemia in stage 3 chronic kidney disease, unspecified whether stage 3a or 3b CKD (H)  Comment: chronic/ongoing, no change  Plan: Hgb follow, Hgb 10.3 on 2/6/23     (R33.9) Urinary retention  Comment: acute/ongoing  Plan: cho placed in ED on 2/11/23 due to urinary retention, continue flomax 0.4mg QD, f/u [facility] urology as OP     (R91.8) Pulmonary nodules  Comment: incidental finding on CXR  Plan: f/u as OP    MED REC REQUIRED  Post Medication Reconciliation Status: medication reconcilation previously completed during another office visit      Orders:  No new orders today      Electronically signed by: Tonya Lynn Haase, APRN CNP

## 2023-02-18 NOTE — PROGRESS NOTES
felicity is a 83 year old who is being evaluated via a billable telephone visit.      What phone number would you like to be contacted at? [phone]  How would you like to obtain your AVS? Sebastien    Distant Location (provider location):  Off-site  Phone call duration: 23 minutes    _____________________________________________________________    [facility]         [phone]  _____________________________________________________________      Chief Complaint: Patient presents with:  Stroke      History of Present Illness: Felicity Garcia is a 83 year old male presenting for follow-up for stroke and IVH.    He was hospitalized at [facility] on 1/15-1/27/2023. Prior to the hospital stay, he had a past medical history of Afib on apixaban, HTN on multiple meds, HLD on statin, IDDM2, CKD.    He presented to the hospital with intermittent amnestic episodes and encephalopathy for days. BP in the /120. Head CT showed R lateral ventricle IVH, CTA showed no abnormality. Hemorrhage felt to be related to hypertension. Given K centra for reversal of coagulopathy. MRI showed scattered areas of acute/subacute appearing infarcts within both cerebral hemispheres and the right basal ganglia which were felt to be cardioembolic due to atrial fibrillation and missed apixaban doses.      Evaluation Summarized  MRI/Head CT [facility] 2/11/23: Interval evolutionary change of the hemorrhage in the R periatrial white matter with fading of hyperdense hemorrhage. No new hemorrhage or mass effect.    MRI: 1/18/2023. Again seen R periatrial IPH with IVr extension into the right lateral ventricle with small amount of hemorrhage in the left lateral ventricle. Moderate T2 hyperintense edema around the parenchymal hemorrhage. No definite underlying enhancing mass although a mass  could be obscured by the hemorrhage. No evidence of hydrocephalus or ventricular entrapment.  2. Scattered areas of  "acute/subacute appearing infarcts within both cerebral hemispheres and the right basal ganglia  3. Moderate diffuse parenchymal volume loss and white matter changes  4. Several probable chronic microhemorrhages in the cerebellum.  5. Thin dural based enhancing lesion over the right cerebral convexity most likely representing a meningioma.   Intracranial Vasculature CTA head/neck 1/15: negative for aneurysm or AVM, grossly similar IVH without evidence of active extravasation, focal moderate stenosis b/l P1      EKG/Telemetry Atrial fibrillation, incomplete RBBB, anteroseptal infarct age undetermined, ST/T wave abnormality, consider inferior ischemia   Other Testing Not Applicable      LDL  1/17/2023: 52 mg/dL   A1C  1/17/2023: 7.2 %   Troponin 1/15/2023: 21 ng/L         Anticoagulation continued to be held at the time of discharge due to hemorrhage. Since being discharged, he was readmitted 1/29-1/31/2023 for hyperglycemia, hypotension and ABBIE. Again seen in the ED 2/11/2023 with \"seizure like activity\" - per chart review noted to have some stiffening but no extremity shaking and decreased LOC with blood glucose at the time noted to be 33. Head CT showed resolving hemorrhage.     Has been home from TCU for about 3 days - states he has been able to take care of all his usual daily tasks including cooking, cleaning, hygiene. States he has been driving and voices no concerns about this. He feels that he is back to his baseline neurologically since the stroke/hemorrhage. BG has been averaging between 100-200. Checking BP regularly - in the 170s/80s. He is scheduled to see his PCP at 11:00 today.      Modified Hinds Scale  Score: 1-No significant disability despite symptoms; able to carry out all usual duties and activities    Impression:   Problem List Items Addressed This Visit        Nervous and Auditory    Nontraumatic intraventricular intracerebral hemorrhage (H) - Primary   Other Visit Diagnoses     Cerebrovascular " "accident (CVA), unspecified mechanism (H)               1. Primary spontaneous R IVH in setting of apixaban, HTN  2. Multifocal bihemispheric ischemic strokes due to cardioembolism from atrial fibrillation  3. Several probable chronic cerebellar microhemorrhages, suspect hypertensive  4. 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 Discussed with Dr. King who will reach out again to his cardiologist. Pending discussion with cardiology, if not enrolling in ASPIRE would recommend resumption of apixaban 5 mg BID. Consider referral for [facility]. All of these options were discussed with the patient today. He would like to hear about whether he is a trial candidate first and if not then will consider the other options.  -Long term outpatient goal BP is <130/80 with tighter control associated with improved vascular outcomes. Following up with PCP today and would recommend adjustment of antihypertensives as per their expertise since BP has been consistently elevated.  -A1c goal <7%, follow with PCP for continued monitoring/management  -He is doing OT in home. Recommend OT driving evaluation. (ordered)      - Return in about 3 months (around 5/27/2023) for ICH and cardioembolic strokes, with A. More or other stroke TAMAR.      Stroke Education provided.  He will call us with any questions.  For any acute neurologic deficits he was advised to  go directly to the hospital rather than call the clinic.    Mleia Schrader PA-C  Neurology  02/27/2023 9:28 AM  To page me or covering stroke neurology team member, click here: AMCOM  Choose \"On Call\" tab at top, then search dropdown box for \"Neurology Adult\" & press Enter, look for Neuro ICU/Stroke    ___________________________________________________________________    Current Medications  Current Outpatient Medications   Medication     acetaminophen " (TYLENOL) 325 MG tablet     carvedilol (COREG) 12.5 MG tablet     emollient (VANICREAM) external cream     glucagon 1 MG kit     insulin glargine (LANTUS PEN) 100 UNIT/ML pen     irbesartan (AVAPRO) 75 MG tablet     melatonin 5 MG tablet     tamsulosin (FLOMAX) 0.4 MG capsule     No current facility-administered medications for this visit.       Past Medical History  Past Medical History:   Diagnosis Date     Anemia      Anemia of chronic disease 5/14/2020     Back pain      CKD (chronic kidney disease) stage 3, GFR 30-59 ml/min (H)      CRF (chronic renal failure), stage 3  5/14/2020     Fall 11/2019    suffered multiple left rib fractures, C3 and T2 fractures     Mixed hyperlipidemia 7/7/2004     Paroxysmal atrial fibrillation (H)      Personal history of colonic polyps 1972    gets colonoscopy every five years, due in 2006     Pleural effusion on left 11/2019    after multiple rib fractures     Pulmonary hypertension (H) 5/10/2020    Added automatically from request for surgery [id number]     Recurrent Left Pleural effusion -- S/P Pleurex Cath 5/12/20 12/30/2019     Rosacea 1989     Type II or unspecified type diabetes mellitus without mention of complication, not stated as uncontrolled 1999     Unspecified essential hypertension 1994       Social History  Social History     Tobacco Use     Smoking status: Former     Packs/day: 0.20     Years: 40.00     Pack years: 8.00     Types: Cigarettes     Start date: 1968     Quit date: 1/1/2008     Years since quitting: 15.1     Smokeless tobacco: Never   Vaping Use     Vaping Use: Never used   Substance Use Topics     Alcohol use: Not Currently     Alcohol/week: 35.0 standard drinks     Drug use: Not Currently       Physical Exam    Vitals - Patient Reported 12/12/2021 12/13/2021 2/27/2023   Height (Patient Reported) - - -   Weight (Patient Reported) - - 145 lb   BMI (Based on Pt Reported Ht/Wt) - - -   Systolic (Patient Reported) - - 178   Diastolic (Patient Reported) - -  97   Pulse (Patient Reported) - - -   My weight today is: 163 163 -               Neurologic Exam:  Phone-only visit. Speech was clear and fluent. Oriented to person/place/time/situation.    Neuroimaging: as per HPI. I personally reviewed those images    Labs:    Coagulation studies:  Recent Labs   Lab Test 01/15/23  1321 11/21/20  1146 07/17/20  0950   INR 1.13 1.50* 1.18*        Lipid panel:  Recent Labs   Lab Test 01/17/23  0436 05/13/20  0553   CHOL 112 116   HDL 50 60   LDL 52 43   TRIG 51 65       HbA1C:  Recent Labs   Lab Test 01/17/23  0436 11/20/20  2233 07/08/20  1223   A1C 7.2* 7.7* 7.4*       Troponin:  Recent Labs   Lab Test 03/17/21  0752 07/08/20  1223 05/21/20  1302   TROPI <0.015 <0.015 0.034       Questionnaires:    Stroke Questionnaire 2/27/2023   Residual effects: Any residual effects from stroke? No, I feel totally back to how I was before the stroke   Level of independence: Could you live alone? Yes   Quality of Life: What work now or before stroke Retired   Quality of Life: Current work status Retired   Quality of Life: How do you get around Drive myself   Quality of Life: Living situation before stroke With family or significant other   Quality of Life: Living situation now With family or significant other   Medication: How do you take your meds Myself, from a pillbox that I set up   Medication: Do you ever miss or forget meds Rarely   Risk Factor: Checking blood pressure at home Yes   Risk Factor: Usual blood pressure numbers 170/90   Risk Factor: Checking blood sugar at home Yes   Risk Factor: Usual blood sugar numbers 100 to 150   Risk Factor: Second-hand smoke at home No   Risk Factor: How much caffeine per day 2   Who completed this questionnaire? The patient independently       Billing:    I spent a total of 60 minutes on the day of the visit.   Time spent doing chart review, history and exam, documentation and further activities per the note

## 2023-02-22 ENCOUNTER — TELEPHONE (OUTPATIENT)
Dept: CARDIOLOGY | Facility: CLINIC | Age: 83
End: 2023-02-22
Payer: COMMERCIAL

## 2023-02-22 NOTE — TELEPHONE ENCOUNTER
[facility] Call Center    Phone Message    May a detailed message be left on voicemail: yes     Reason for Call: Medication Question or concern regarding medication   Prescription Clarification    Pt stated he needs clarification on which medications he is supposed to be taking.  Pt has been in hospital for a bit and isn't sure what he should be taking at this point.  He lost his list.          Action Taken: Other: cardio    Travel Screening: Not Applicable

## 2023-02-23 NOTE — TELEPHONE ENCOUNTER
RN discussed with patient that he should contact his PCP for updated medication list.  Patient had stay recently in TCU, there are new medications awaiting reconciliation.  Patient verbalized understanding and will call PCP.  Staci Bruce RN on 2/23/2023 at 9:48 AM

## 2023-02-27 ENCOUNTER — VIRTUAL VISIT (OUTPATIENT)
Dept: NEUROLOGY | Facility: CLINIC | Age: 83
End: 2023-02-27
Payer: COMMERCIAL

## 2023-02-27 ENCOUNTER — TELEPHONE (OUTPATIENT)
Dept: CARDIOLOGY | Facility: CLINIC | Age: 83
End: 2023-02-27

## 2023-02-27 ENCOUNTER — MEDICAL CORRESPONDENCE (OUTPATIENT)
Dept: HEALTH INFORMATION MANAGEMENT | Facility: CLINIC | Age: 83
End: 2023-02-27

## 2023-02-27 DIAGNOSIS — I63.9 CEREBROVASCULAR ACCIDENT (CVA), UNSPECIFIED MECHANISM (H): ICD-10-CM

## 2023-02-27 DIAGNOSIS — I61.5 NONTRAUMATIC INTRAVENTRICULAR INTRACEREBRAL HEMORRHAGE, UNSPECIFIED LATERALITY (H): Primary | ICD-10-CM

## 2023-02-27 PROCEDURE — 99215 OFFICE O/P EST HI 40 MIN: CPT | Mod: 95 | Performed by: PHYSICIAN ASSISTANT

## 2023-02-27 NOTE — TELEPHONE ENCOUNTER
Patient recently hospitalized for hemorrhagic stroke.  Received a call from stroke neurology team.  Patient is a candidate for the [facility] trial- see notes from 2/27/23 visit.   Neurology is looking for approval from cardiology on this.  Please advise.  Staci Bruce RN on 2/27/2023 at 9:53 AM

## 2023-02-27 NOTE — NURSING NOTE
Is the patient currently in the state of [place]? YES    Visit mode:TELEPHONE    If the visit is dropped, the patient can be reconnected by: TELEPHONE VISIT: Phone number: [phone]    Will anyone else be joining the visit? NO      How would you like to obtain your AVS? MyChart    Are changes needed to the allergy or medication list? LASHELL ALLISON CMA

## 2023-02-27 NOTE — PATIENT INSTRUCTIONS
Stroke admission (s/p TNK):  -Continue long term follow-up of stroke risk factor management with PCP  -Life style habits that can be beneficial for overall cardiovascular health: Mediterranean diet or diet (fruits, vegetables, low fat dairy & reduced saturated fat), exercise at least 30 minutes/day x 5 days/week, BMI goal <25.   -Call our stroke nurse care coordinator Oanh with any questions or concerns at [phone], or send a [employer] medical advice message  -Call 911 with any new stroke symptoms

## 2023-02-27 NOTE — LETTER
2/27/2023         RE: Felicity Garcia  [id number] [facility]] [id number]        Dear Colleague,    Thank you for referring your patient, Felicity Garcia, to the [facility] NEUROLOGY [facility]. Please see a copy of my visit note below.    felicity is a 83 year old who is being evaluated via a billable telephone visit.      What phone number would you like to be contacted at? [phone]  How would you like to obtain your AVS? MyChart    Distant Location (provider location):  Off-site  Phone call duration: 23 minutes    _____________________________________________________________    [facility] Neurology [facility]         [phone]  _____________________________________________________________      Chief Complaint: Patient presents with:  Stroke      History of Present Illness: Felicity Garcia is a 83 year old male presenting for follow-up for stroke and IVH.    He was hospitalized at [facility] on 1/15-1/27/2023. Prior to the hospital stay, he had a past medical history of Afib on apixaban, HTN on multiple meds, HLD on statin, IDDM2, CKD.    He presented to the hospital with intermittent amnestic episodes and encephalopathy for days. BP in the /120. Head CT showed R lateral ventricle IVH, CTA showed no abnormality. Hemorrhage felt to be related to hypertension. Given K centra for reversal of coagulopathy. MRI showed scattered areas of acute/subacute appearing infarcts within both cerebral hemispheres and the right basal ganglia which were felt to be cardioembolic due to atrial fibrillation and missed apixaban doses.      Evaluation Summarized  MRI/Head CT [facility] 2/11/23: Interval evolutionary change of the hemorrhage in the R periatrial white matter with fading of hyperdense hemorrhage. No new hemorrhage or mass effect.    MRI: 1/18/2023. Again seen R periatrial IPH with IVr extension into the right lateral ventricle with small amount of hemorrhage in the left  "lateral ventricle. Moderate T2 hyperintense edema around the parenchymal hemorrhage. No definite underlying enhancing mass although a mass  could be obscured by the hemorrhage. No evidence of hydrocephalus or ventricular entrapment.  2. Scattered areas of acute/subacute appearing infarcts within both cerebral hemispheres and the right basal ganglia  3. Moderate diffuse parenchymal volume loss and white matter changes  4. Several probable chronic microhemorrhages in the cerebellum.  5. Thin dural based enhancing lesion over the right cerebral convexity most likely representing a meningioma.   Intracranial Vasculature CTA head/neck 1/15: negative for aneurysm or AVM, grossly similar IVH without evidence of active extravasation, focal moderate stenosis b/l P1      EKG/Telemetry Atrial fibrillation, incomplete RBBB, anteroseptal infarct age undetermined, ST/T wave abnormality, consider inferior ischemia   Other Testing Not Applicable      LDL  1/17/2023: 52 mg/dL   A1C  1/17/2023: 7.2 %   Troponin 1/15/2023: 21 ng/L         Anticoagulation continued to be held at the time of discharge due to hemorrhage. Since being discharged, he was readmitted 1/29-1/31/2023 for hyperglycemia, hypotension and ABBIE. Again seen in the ED 2/11/2023 with \"seizure like activity\" - per chart review noted to have some stiffening but no extremity shaking and decreased LOC with blood glucose at the time noted to be 33. Head CT showed resolving hemorrhage.     Has been home from TCU for about 3 days - states he has been able to take care of all his usual daily tasks including cooking, cleaning, hygiene. States he has been driving and voices no concerns about this. He feels that he is back to his baseline neurologically since the stroke/hemorrhage. BG has been averaging between 100-200. Checking BP regularly - in the 170s/80s. He is scheduled to see his PCP at 11:00 today.      Modified Raimundo Scale  Score: 1-No significant disability despite " "symptoms; able to carry out all usual duties and activities    Impression:   Problem List Items Addressed This Visit        Nervous and Auditory    Nontraumatic intraventricular intracerebral hemorrhage (H) - Primary   Other Visit Diagnoses     Cerebrovascular accident (CVA), unspecified mechanism (H)               1. Primary spontaneous R IVH in setting of apixaban, HTN  2. Multifocal bihemispheric ischemic strokes due to cardioembolism from atrial fibrillation  3. Several probable chronic cerebellar microhemorrhages, suspect hypertensive  4. 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 Discussed with Dr. King who will reach out again to his cardiologist. Pending discussion with cardiology, if not enrolling in ASPIRE would recommend resumption of apixaban 5 mg BID. Consider referral for Watchman. All of these options were discussed with the patient today. He would like to hear about whether he is a trial candidate first and if not then will consider the other options.  -Long term outpatient goal BP is <130/80 with tighter control associated with improved vascular outcomes. Following up with PCP today and would recommend adjustment of antihypertensives as per their expertise since BP has been consistently elevated.  -A1c goal <7%, follow with PCP for continued monitoring/management  -He is doing OT in home. Recommend OT driving evaluation. (ordered)      - Return in about 3 months (around 5/27/2023) for ICH and cardioembolic strokes, with A. More or other stroke TAMAR.      Stroke Education provided.  He will call us with any questions.  For any acute neurologic deficits he was advised to  go directly to the hospital rather than call the clinic.    Melia Schrader PA-C  Neurology  02/27/2023 9:28 AM  To page me or covering stroke neurology team member, click here: [facility]  Choose \"On Call\" tab " "at top, then search dropdown box for \"Neurology Adult\" & press Enter, look for Neuro ICU/Stroke    ___________________________________________________________________    Current Medications  Current Outpatient Medications   Medication     acetaminophen (TYLENOL) 325 MG tablet     carvedilol (COREG) 12.5 MG tablet     emollient (VANICREAM) external cream     glucagon 1 MG kit     insulin glargine (LANTUS PEN) 100 UNIT/ML pen     irbesartan (AVAPRO) 75 MG tablet     melatonin 5 MG tablet     tamsulosin (FLOMAX) 0.4 MG capsule     No current facility-administered medications for this visit.       Past Medical History  Past Medical History:   Diagnosis Date     Anemia      Anemia of chronic disease 5/14/2020     Back pain      CKD (chronic kidney disease) stage 3, GFR 30-59 ml/min (H)      CRF (chronic renal failure), stage 3  5/14/2020     Fall 11/2019    suffered multiple left rib fractures, C3 and T2 fractures     Mixed hyperlipidemia 7/7/2004     Paroxysmal atrial fibrillation (H)      Personal history of colonic polyps 1972    gets colonoscopy every five years, due in 2006     Pleural effusion on left 11/2019    after multiple rib fractures     Pulmonary hypertension (H) 5/10/2020    Added automatically from request for surgery [id number]     Recurrent Left Pleural effusion -- S/P Pleurex Cath 5/12/20 12/30/2019     Rosacea 1989     Type II or unspecified type diabetes mellitus without mention of complication, not stated as uncontrolled 1999     Unspecified essential hypertension 1994       Social History  Social History     Tobacco Use     Smoking status: Former     Packs/day: 0.20     Years: 40.00     Pack years: 8.00     Types: Cigarettes     Start date: 1968     Quit date: 1/1/2008     Years since quitting: 15.1     Smokeless tobacco: Never   Vaping Use     Vaping Use: Never used   Substance Use Topics     Alcohol use: Not Currently     Alcohol/week: 35.0 standard drinks     Drug use: Not Currently       Physical " Exam    Vitals - Patient Reported 12/12/2021 12/13/2021 2/27/2023   Height (Patient Reported) - - -   Weight (Patient Reported) - - 145 lb   BMI (Based on Pt Reported Ht/Wt) - - -   Systolic (Patient Reported) - - 178   Diastolic (Patient Reported) - - 97   Pulse (Patient Reported) - - -   My weight today is: 163 163 -               Neurologic Exam:  Phone-only visit. Speech was clear and fluent. Oriented to person/place/time/situation.    Neuroimaging: as per HPI. I personally reviewed those images    Labs:    Coagulation studies:  Recent Labs   Lab Test 01/15/23  1321 11/21/20  1146 07/17/20  0950   INR 1.13 1.50* 1.18*        Lipid panel:  Recent Labs   Lab Test 01/17/23  0436 05/13/20  0553   CHOL 112 116   HDL 50 60   LDL 52 43   TRIG 51 65       HbA1C:  Recent Labs   Lab Test 01/17/23  0436 11/20/20  2233 07/08/20  1223   A1C 7.2* 7.7* 7.4*       Troponin:  Recent Labs   Lab Test 03/17/21  0752 07/08/20  1223 05/21/20  1302   TROPI <0.015 <0.015 0.034       Questionnaires:    Stroke Questionnaire 2/27/2023   Residual effects: Any residual effects from stroke? No, I feel totally back to how I was before the stroke   Level of independence: Could you live alone? Yes   Quality of Life: What work now or before stroke Retired   Quality of Life: Current work status Retired   Quality of Life: How do you get around Drive myself   Quality of Life: Living situation before stroke With family or significant other   Quality of Life: Living situation now With family or significant other   Medication: How do you take your meds Myself, from a pillbox that I set up   Medication: Do you ever miss or forget meds Rarely   Risk Factor: Checking blood pressure at home Yes   Risk Factor: Usual blood pressure numbers 170/90   Risk Factor: Checking blood sugar at home Yes   Risk Factor: Usual blood sugar numbers 100 to 150   Risk Factor: Second-hand smoke at home No   Risk Factor: How much caffeine per day 2   Who completed this  questionnaire? The patient independently       Billing:    I spent a total of 60 minutes on the day of the visit.   Time spent doing chart review, history and exam, documentation and further activities per the note        Again, thank you for allowing me to participate in the care of your patient.        Sincerely,        Melia Schrader PA-C

## 2023-02-28 NOTE — TELEPHONE ENCOUNTER
Cony Julien DO Lidke, Jen M RN  Caller: Unspecified (Yesterday,  9:51 AM)  No. I would not advise this trial. He was on a reduced dose of Eliquis, he has CKD and possible meningioma according to the discharge summary.  I believe he is high risk of recurrent major adverse bleeding events.     RN will route to stroke neurology.  Staci Bruce RN on 2/28/2023 at 9:47 AM

## 2023-03-07 ENCOUNTER — OFFICE VISIT (OUTPATIENT)
Dept: CARDIOLOGY | Facility: CLINIC | Age: 83
End: 2023-03-07
Payer: COMMERCIAL

## 2023-03-07 VITALS
DIASTOLIC BLOOD PRESSURE: 98 MMHG | OXYGEN SATURATION: 97 % | BODY MASS INDEX: 22.63 KG/M2 | HEIGHT: 69 IN | WEIGHT: 152.8 LBS | HEART RATE: 65 BPM | SYSTOLIC BLOOD PRESSURE: 170 MMHG

## 2023-03-07 DIAGNOSIS — I50.32 CHRONIC DIASTOLIC HEART FAILURE (H): ICD-10-CM

## 2023-03-07 DIAGNOSIS — I61.5 NONTRAUMATIC INTRAVENTRICULAR INTRACEREBRAL HEMORRHAGE, UNSPECIFIED LATERALITY (H): ICD-10-CM

## 2023-03-07 DIAGNOSIS — E78.5 HYPERLIPIDEMIA LDL GOAL <100: Primary | ICD-10-CM

## 2023-03-07 DIAGNOSIS — I50.32 CHRONIC HEART FAILURE WITH PRESERVED EJECTION FRACTION (HFPEF) (H): ICD-10-CM

## 2023-03-07 DIAGNOSIS — I10 ESSENTIAL HYPERTENSION: ICD-10-CM

## 2023-03-07 DIAGNOSIS — I50.33 ACUTE ON CHRONIC HEART FAILURE WITH PRESERVED EJECTION FRACTION (HFPEF) (H): ICD-10-CM

## 2023-03-07 PROCEDURE — 99215 OFFICE O/P EST HI 40 MIN: CPT | Performed by: NURSE PRACTITIONER

## 2023-03-07 RX ORDER — LOVASTATIN 20 MG
20 TABLET ORAL AT BEDTIME
Qty: 90 TABLET | Refills: 3 | COMMUNITY
Start: 2023-03-07 | End: 2023-04-20

## 2023-03-07 RX ORDER — IRBESARTAN 150 MG/1
150 TABLET ORAL AT BEDTIME
Qty: 90 TABLET | Refills: 3 | Status: SHIPPED | OUTPATIENT
Start: 2023-03-07 | End: 2023-01-01

## 2023-03-07 RX ORDER — CARVEDILOL 12.5 MG/1
12.5 TABLET ORAL 2 TIMES DAILY WITH MEALS
Qty: 180 TABLET | Refills: 3 | Status: SHIPPED | OUTPATIENT
Start: 2023-03-07 | End: 2023-01-01

## 2023-03-07 NOTE — LETTER
3/7/2023    Jessika Ratliff Adalid  [address]  [place] [address]    RE: Tc Garcia       Dear Colleague,     I had the pleasure of seeing Tc Garcia in the [facility].    Cardiology Clinic Progress Note    Service Date: March 7, 2023    Primary Cardiology team: Dr. Julien and Ame Mejía PA-C      Reason for Visit: Hospital follow up    HPI:   I had the pleasure of seeing Mr. Tc Garcia in the clinic. I was meeting him for the first time today. He is a pleasant 83 year old male with a complex past medical history notable for chronic HFpEF, pulmonary hypertension, chronic recurrent transudative left pleural effusion/empyema, requiring pleurex catheter and ultimately chest tube placement (malignancy ruled out), paroxsymal atrial fibrillation/flutter (anticoagulated with apixaban), type 2 diabetes mellitus, hypertension, hyperlipidemia, chronic kidney disease stage 3 with baseline creatinine around 1.4-1.7, anemia of chronic disease, obesity, and history of medication non-compliance.     He has followed with Dr. Julien for his cardiology care and with Ame Mejía PA-C in the [facility] for management of his HFpEF and diuretics as he had multiple admissions for acute CHF in 2020. He was last seen in the clinic by Dr. Julien in July 2022 and was doing well from a cardiac standpoint. Losartan 100 mg daily was switched to irbesartan 150 mg once daily at that time.       In January 2023, the patient was admitted to [facility] several times. He presented initially on 1/15/23 with weakness and altered mental status and was found to have nontraumatic intraventricular intracerebral hemorrhage in the setting of hypertension and anticoagulation with apixaban. He received Kcentra for anticoagulation reversal. He re-presented on 1/29/23 with very labile blood sugars going from the 600s to under 50 after treatment. He was also hypotensive initially in the setting  "of hypovolemia and had some issues with labile blood pressures during his stay as well. Several adjustments were made to medications, including discontinuation of apixaban. Irbesratan was decreased from 150 mg to 75 mg once daily. He was also previously on lasix 40 mg once daily as well as amlodipine at 10 mg once daily and these were both stopped.     He was later evaluated again in the Emergency Department on 2/11/2023 with \"seizure like activity\" - per chart review noted to have some stiffening but no extremity shaking and decreased level of consciousness with blood glucose at the time noted to be 33. Head CT showed resolving hemorrhage.     Today, Mr. Garcia presents to the clinic in follow up of his recent hospitalizations. He was initially discharged to the TCU at [facility]. There are notes regarding mild cognitive impairment and forgetfulness on evaluation there with a MoCA score of 24/30. He presents alone today and I am meeting him for the first time so history taking was fairly challenging. The patient states that he has been feeling quite well from a cardiac standpoint with no concerns as far as symptoms of any chest pain, shortness of breath, exertional dyspnea, palpitations, dizziness, or lightheadedness. He notes mild swelling at times in his ankles, but feels this has been stable of late and usually resolves in the morning. It sounds like his blood sugar readings have been more stable over the past few weeks without recurrent issues with hypoglycemia. He notes appetite was poor in January and he wasn't eating well at that time, but he feels that has improved over the past month or so.      The patient tells me that he is back living at his home in [place] independently with his wife. I do see notes from the TCU that there was question of moving into an prison. He does have a daughter in the area and it sounds like she and his wife are supportive in his care. He tells me that he has been " checking his weights daily and they have been quite stable around 150-155 lbs over the past few weeks. He notes blood pressure has been high at times around the 150s-160s systolic range most of the time, but sometimes better around the 120s-130s range. Blood pressure was initially markedly elevated in the clinic today at 225/110 by the electric cuff. Readings were lower on a manual recheck at 170/98. No symptoms of headache, weakness, facial droop, or dysphasia.     ASSESSMENT:  1. Chronic HFpEF, euvolemic with weights stable around 150-155 lbs  2. Pulmonary hypertension  3. Chronic atrial fibrillation, rate controlled. Previously on apixaban but stopped due to intraventricular intracerebral hemorrhage.  4. Recent nontraumatic intraventricular intracerebral hemorrhage in 01/2023   5. Type 2 diabetes mellitus with labile blood sugars and frequent hypoglycemic episodes recently  6. Labile hypertension  7. Hyperlipidemia  8. CKD stage III, follows with [facility]. Baseline creat ~1.2-1.3.  9. Anemia of chronic disease  10. Mild cognitive impairment/dementia--recent MoCA score 24/30    PLAN:  - Increase irbesartan from 75 mg to 150 mg once daily for better blood pressure control. Readings mostly under 140/90 is a reasonable target for him given frailty, hx of falls, and labile readings likely with a component of orthostatic hypotension.   - Check a BMP in 1-2 weeks with a nurse only visit for reassessment of his blood pressure. Recommended he bring in his home blood pressure cuffs with him for comparison to the clinic readings (states he has both a wrist cuff and upper arm cuff he sometimes uses).  - Could consider referral to discuss possible BRIANNE closure device (Watchman) implant in the future as unable to tolerate anticoagulation with recent intracerebral hemorrhage. Uncertain if he would be a good candidate with frailty and some cognitive impairment. We did not have time to discuss this today.    - Counseled  on continuing to check weights daily and trying to stick to a low sodium diet (under 2,000 mg/day).  - Reviewed to call the clinic if he develops signs concerning for fluid retention, including weight gain of over 3 pounds in 1 night or over 5 pounds in 1 week, worsening shortness of breath, or worsening swelling in the legs or abdomen.    - Follow up with Dr. Julien for an annual visit with her around July as previously planned.    Thank you for the opportunity to participate in this pleasant patient's care. We would be happy to see him sooner if needed for any concerns in the meantime.     45 total minutes was spent today including chart review, precharting, history and exam, post visit documentation, and reviewing studies as outlined above.     OMAR Whitten, CNP   Nurse Practitioner  [facility]  Pager: [phone]  Text Page  (8am - 5pm, M-F)    Orders this Visit:  Orders Placed This Encounter   Procedures     Basic metabolic panel     Follow-Up with Cardiology Nurse     Orders Placed This Encounter   Medications     irbesartan (AVAPRO) 150 MG tablet     Sig: Take 1 tablet (150 mg) by mouth At Bedtime     Dispense:  90 tablet     Refill:  3     carvedilol (COREG) 12.5 MG tablet     Sig: Take 1 tablet (12.5 mg) by mouth 2 times daily (with meals)     Dispense:  180 tablet     Refill:  3     lovastatin (MEVACOR) 20 MG tablet     Sig: Take 1 tablet (20 mg) by mouth At Bedtime     Dispense:  90 tablet     Refill:  3     Medications Discontinued During This Encounter   Medication Reason     carvedilol (COREG) 12.5 MG tablet Reorder (No AVS / No eCancel)     irbesartan (AVAPRO) 75 MG tablet Reorder (No AVS / No eCancel)     Encounter Diagnoses   Name Primary?     Chronic diastolic heart failure (H)      Essential hypertension      Acute on chronic heart failure with preserved ejection fraction (HFpEF) (H)      Chronic heart failure with preserved ejection fraction (HFpEF) (H)      Nontraumatic  intraventricular intracerebral hemorrhage, unspecified laterality (H)      Hyperlipidemia LDL goal <100 Yes       CURRENT MEDICATIONS:  Current Outpatient Medications   Medication Sig Dispense Refill     acetaminophen (TYLENOL) 325 MG tablet Take 650 mg by mouth every 4 hours as needed for mild pain or fever       carvedilol (COREG) 12.5 MG tablet Take 1 tablet (12.5 mg) by mouth 2 times daily (with meals) 180 tablet 3     emollient (VANICREAM) external cream Apply topically daily Apply to  ankles/feet topically one time a day for Dry skin       glucagon 1 MG kit 1 mg as needed for low blood sugar       insulin glargine (LANTUS PEN) 100 UNIT/ML pen Inject 10 Units Subcutaneous At Bedtime 15 mL      irbesartan (AVAPRO) 150 MG tablet Take 1 tablet (150 mg) by mouth At Bedtime 90 tablet 3     lovastatin (MEVACOR) 20 MG tablet Take 1 tablet (20 mg) by mouth At Bedtime 90 tablet 3     melatonin 5 MG tablet Take 5 mg by mouth At Bedtime       tamsulosin (FLOMAX) 0.4 MG capsule Take 1 capsule (0.4 mg) by mouth daily       ALLERGIES  Allergies   Allergen Reactions     No Known Drug Allergies      PAST MEDICAL, SURGICAL, FAMILY HISTORY:  History was reviewed and updated as needed, see medical record.    SOCIAL HISTORY:  Social History     Socioeconomic History     Marital status:      Spouse name: Lianna     Number of children: 4     Years of education: 16     Highest education level: Not on file   Occupational History     Occupation: [employer]     Employer: [employer]    Tobacco Use     Smoking status: Former     Packs/day: 0.20     Years: 40.00     Pack years: 8.00     Types: Cigarettes     Start date: 1968     Quit date: 1/1/2008     Years since quitting: 15.1     Smokeless tobacco: Never   Vaping Use     Vaping Use: Never used   Substance and Sexual Activity     Alcohol use: Not Currently     Alcohol/week: 35.0 standard drinks     Drug use: Not Currently     Sexual activity: Not on file   Other  "Topics Concern     Parent/sibling w/ CABG, MI or angioplasty before 65F 55M? Not Asked   Social History Narrative     Not on file     Social Determinants of Health     Financial Resource Strain: Not on file   Food Insecurity: Not on file   Transportation Needs: Not on file   Physical Activity: Not on file   Stress: Not on file   Social Connections: Not on file   Intimate Partner Violence: Not on file   Housing Stability: Not on file     Review of Systems:  Skin:  not assessed     Eyes:  not assessed    ENT:  not assessed    Respiratory:  Negative    Cardiovascular:  Negative    Gastroenterology: not assessed    Genitourinary:  not assessed    Musculoskeletal:  not assessed    Neurologic:  not assessed    Psychiatric:  not assessed    Heme/Lymph/Imm:  not assessed    Endocrine:  Positive for       Physical Exam:  Vitals: BP (!) 170/98 (BP Location: Left arm, Patient Position: Sitting, Cuff Size: Adult Regular)   Pulse 65   Ht 1.753 m (5' 9\")   Wt 69.3 kg (152 lb 12.8 oz)   SpO2 97%   BMI 22.56 kg/m     Wt Readings from Last 4 Encounters:   03/07/23 69.3 kg (152 lb 12.8 oz)   02/17/23 69.9 kg (154 lb)   02/16/23 69.9 kg (154 lb)   02/13/23 69.9 kg (154 lb 1.6 oz)     CONSTITUTIONAL: Appears his stated age, well nourished, and in no acute distress.  HEENT: Pupils equal, round. No scleral icterus.  NECK: Supple, no JVD.  C/V:  Irregularly irregular rhythm, controlled rate, no murmur, rub or gallop.   RESP: Respirations are unlabored. Lungs are clear to auscultation with no wheezes or crackles bilaterally.  EXTREM: Moves all extremities well and symmetrically. There is trace non-pitting LE edema in the ankles bilaterally.    NEURO: Alert and oriented, cooperative. Face symmetrical, no facial droop or gross focal deficits.   PSYCH: Affect appropriate. Mentation normal. Mildly forgetful with some confusion on his medications.   SKIN: Warm and dry. No apparent rashes or bruising.    Recent Lab Results:  LIPID " RESULTS:  Lab Results   Component Value Date    CHOL 112 01/17/2023    CHOL 116 05/13/2020    HDL 50 01/17/2023    HDL 60 05/13/2020    LDL 52 01/17/2023    LDL 43 05/13/2020    TRIG 51 01/17/2023    TRIG 65 05/13/2020    CHOLHDLRATIO 2.7 03/09/2009     LIVER ENZYME RESULTS:  Lab Results   Component Value Date    AST 22 02/11/2023    AST 12 03/22/2021    ALT 11 02/11/2023    ALT 5 03/22/2021     CBC RESULTS:  Lab Results   Component Value Date    WBC 14.4 (H) 02/11/2023    WBC 10.4 06/15/2021    RBC 4.44 02/11/2023    RBC 4.08 (A) 06/15/2021    HGB 12.3 (L) 02/11/2023    HGB 11.7 (A) 06/15/2021    HCT 39.3 (L) 02/11/2023    HCT 36.3 (A) 06/15/2021    MCV 89 02/11/2023    MCV 89 06/15/2021    MCH 27.7 02/11/2023    MCH 28.7 06/15/2021    MCHC 31.3 (L) 02/11/2023    MCHC 32.2 06/15/2021    RDW 15.5 (H) 02/11/2023    RDW 14.2 06/15/2021     02/11/2023     06/15/2021     BMP RESULTS:  Lab Results   Component Value Date     02/11/2023     07/12/2021    POTASSIUM 3.6 02/11/2023    POTASSIUM 4.2 01/17/2023    POTASSIUM 4.0 07/12/2021    CHLORIDE 99 02/11/2023    CHLORIDE 105 01/17/2023    CHLORIDE 102 11/04/2021    CHLORIDE 100 07/12/2021    CO2 29 02/11/2023    CO2 25 01/17/2023    CO2 26 07/12/2021    ANIONGAP 9 02/11/2023    ANIONGAP 12 01/17/2023    ANIONGAP 4 03/18/2021     (H) 02/11/2023     (H) 02/11/2023     (H) 01/17/2023     (A) 07/12/2021    BUN 11.6 02/11/2023    BUN 36 (H) 01/17/2023    BUN 18 07/12/2021    BUN 10 07/12/2021    CR 1.15 02/11/2023    CR 1.73 (A) 07/12/2021    GFRESTIMATED 63 02/11/2023    GFRESTIMATED 40 (L) 06/21/2021    GFRESTBLACK 46 (L) 06/21/2021    LLOYD 9.2 02/11/2023    LLOYD 9.3 07/12/2021      A1C RESULTS:  Lab Results   Component Value Date    A1C 7.2 (H) 01/17/2023    A1C 7.7 (H) 11/20/2020     This note was completed in part using Dragon voice recognition software. Although reviewed after completion, some word and grammatical errors  may occur.         Thank you for allowing me to participate in the care of your patient.    Sincerely,     Mario Ponce NP     [facility]

## 2023-03-07 NOTE — PROGRESS NOTES
Cardiology Clinic Progress Note    Service Date: March 7, 2023    Primary Cardiology team: Dr. Julien and Ame Mejía PA-C      Reason for Visit: Hospital follow up    HPI:   I had the pleasure of seeing Mr. Tc Garcia in the clinic. I was meeting him for the first time today. He is a pleasant 83 year old male with a complex past medical history notable for chronic HFpEF, pulmonary hypertension, chronic recurrent transudative left pleural effusion/empyema, requiring pleurex catheter and ultimately chest tube placement (malignancy ruled out), paroxsymal atrial fibrillation/flutter (anticoagulated with apixaban), type 2 diabetes mellitus, hypertension, hyperlipidemia, chronic kidney disease stage 3 with baseline creatinine around 1.4-1.7, anemia of chronic disease, obesity, and history of medication non-compliance.     He has followed with Dr. Julien for his cardiology care and with Ame Mejía PA-C in the [facility] for management of his HFpEF and diuretics as he had multiple admissions for acute CHF in 2020. He was last seen in the clinic by Dr. Julien in July 2022 and was doing well from a cardiac standpoint. Losartan 100 mg daily was switched to irbesartan 150 mg once daily at that time.       In January 2023, the patient was admitted to [facility] several times. He presented initially on 1/15/23 with weakness and altered mental status and was found to have nontraumatic intraventricular intracerebral hemorrhage in the setting of hypertension and anticoagulation with apixaban. He received Kcentra for anticoagulation reversal. He re-presented on 1/29/23 with very labile blood sugars going from the 600s to under 50 after treatment. He was also hypotensive initially in the setting of hypovolemia and had some issues with labile blood pressures during his stay as well. Several adjustments were made to medications, including discontinuation of apixaban. Irbesratan was decreased from 150  "mg to 75 mg once daily. He was also previously on lasix 40 mg once daily as well as amlodipine at 10 mg once daily and these were both stopped.     He was later evaluated again in the Emergency Department on 2/11/2023 with \"seizure like activity\" - per chart review noted to have some stiffening but no extremity shaking and decreased level of consciousness with blood glucose at the time noted to be 33. Head CT showed resolving hemorrhage.     Today, Mr. Garcia presents to the clinic in follow up of his recent hospitalizations. He was initially discharged to the TCU at [facility]. There are notes regarding mild cognitive impairment and forgetfulness on evaluation there with a MoCA score of 24/30. He presents alone today and I am meeting him for the first time so history taking was fairly challenging. The patient states that he has been feeling quite well from a cardiac standpoint with no concerns as far as symptoms of any chest pain, shortness of breath, exertional dyspnea, palpitations, dizziness, or lightheadedness. He notes mild swelling at times in his ankles, but feels this has been stable of late and usually resolves in the morning. It sounds like his blood sugar readings have been more stable over the past few weeks without recurrent issues with hypoglycemia. He notes appetite was poor in January and he wasn't eating well at that time, but he feels that has improved over the past month or so.      The patient tells me that he is back living at his home in [place] independently with his wife. I do see notes from the TCU that there was question of moving into an nursing home. He does have a daughter in the area and it sounds like she and his wife are supportive in his care. He tells me that he has been checking his weights daily and they have been quite stable around 150-155 lbs over the past few weeks. He notes blood pressure has been high at times around the 150s-160s systolic range most of the time, but " sometimes better around the 120s-130s range. Blood pressure was initially markedly elevated in the clinic today at 225/110 by the electric cuff. Readings were lower on a manual recheck at 170/98. No symptoms of headache, weakness, facial droop, or dysphasia.     ASSESSMENT:  1. Chronic HFpEF, euvolemic with weights stable around 150-155 lbs  2. Pulmonary hypertension  3. Chronic atrial fibrillation, rate controlled. Previously on apixaban but stopped due to intraventricular intracerebral hemorrhage.  4. Recent nontraumatic intraventricular intracerebral hemorrhage in 01/2023   5. Type 2 diabetes mellitus with labile blood sugars and frequent hypoglycemic episodes recently  6. Labile hypertension  7. Hyperlipidemia  8. CKD stage III, follows with [facility] nephrology. Baseline creat ~1.2-1.3.  9. Anemia of chronic disease  10. Mild cognitive impairment/dementia--recent MoCA score 24/30    PLAN:  - Increase irbesartan from 75 mg to 150 mg once daily for better blood pressure control. Readings mostly under 140/90 is a reasonable target for him given frailty, hx of falls, and labile readings likely with a component of orthostatic hypotension.   - Check a BMP in 1-2 weeks with a nurse only visit for reassessment of his blood pressure. Recommended he bring in his home blood pressure cuffs with him for comparison to the clinic readings (states he has both a wrist cuff and upper arm cuff he sometimes uses).  - Could consider referral to discuss possible BRIANNE closure device (Watchman) implant in the future as unable to tolerate anticoagulation with recent intracerebral hemorrhage. Uncertain if he would be a good candidate with frailty and some cognitive impairment. We did not have time to discuss this today.    - Counseled on continuing to check weights daily and trying to stick to a low sodium diet (under 2,000 mg/day).  - Reviewed to call the clinic if he develops signs concerning for fluid retention, including weight gain  of over 3 pounds in 1 night or over 5 pounds in 1 week, worsening shortness of breath, or worsening swelling in the legs or abdomen.    - Follow up with Dr. Julien for an annual visit with her around July as previously planned.    Thank you for the opportunity to participate in this pleasant patient's care. We would be happy to see him sooner if needed for any concerns in the meantime.     45 total minutes was spent today including chart review, precharting, history and exam, post visit documentation, and reviewing studies as outlined above.     OMAR Whitten, CNP   Nurse Practitioner  [facility]  Pager: [phone]  Text Page  (8am - 5pm, M-F)    Orders this Visit:  Orders Placed This Encounter   Procedures     Basic metabolic panel     Follow-Up with Cardiology Nurse     Orders Placed This Encounter   Medications     irbesartan (AVAPRO) 150 MG tablet     Sig: Take 1 tablet (150 mg) by mouth At Bedtime     Dispense:  90 tablet     Refill:  3     carvedilol (COREG) 12.5 MG tablet     Sig: Take 1 tablet (12.5 mg) by mouth 2 times daily (with meals)     Dispense:  180 tablet     Refill:  3     lovastatin (MEVACOR) 20 MG tablet     Sig: Take 1 tablet (20 mg) by mouth At Bedtime     Dispense:  90 tablet     Refill:  3     Medications Discontinued During This Encounter   Medication Reason     carvedilol (COREG) 12.5 MG tablet Reorder (No AVS / No eCancel)     irbesartan (AVAPRO) 75 MG tablet Reorder (No AVS / No eCancel)     Encounter Diagnoses   Name Primary?     Chronic diastolic heart failure (H)      Essential hypertension      Acute on chronic heart failure with preserved ejection fraction (HFpEF) (H)      Chronic heart failure with preserved ejection fraction (HFpEF) (H)      Nontraumatic intraventricular intracerebral hemorrhage, unspecified laterality (H)      Hyperlipidemia LDL goal <100 Yes       CURRENT MEDICATIONS:  Current Outpatient Medications   Medication Sig Dispense Refill      acetaminophen (TYLENOL) 325 MG tablet Take 650 mg by mouth every 4 hours as needed for mild pain or fever       carvedilol (COREG) 12.5 MG tablet Take 1 tablet (12.5 mg) by mouth 2 times daily (with meals) 180 tablet 3     emollient (VANICREAM) external cream Apply topically daily Apply to  ankles/feet topically one time a day for Dry skin       glucagon 1 MG kit 1 mg as needed for low blood sugar       insulin glargine (LANTUS PEN) 100 UNIT/ML pen Inject 10 Units Subcutaneous At Bedtime 15 mL      irbesartan (AVAPRO) 150 MG tablet Take 1 tablet (150 mg) by mouth At Bedtime 90 tablet 3     lovastatin (MEVACOR) 20 MG tablet Take 1 tablet (20 mg) by mouth At Bedtime 90 tablet 3     melatonin 5 MG tablet Take 5 mg by mouth At Bedtime       tamsulosin (FLOMAX) 0.4 MG capsule Take 1 capsule (0.4 mg) by mouth daily       ALLERGIES  Allergies   Allergen Reactions     No Known Drug Allergies      PAST MEDICAL, SURGICAL, FAMILY HISTORY:  History was reviewed and updated as needed, see medical record.    SOCIAL HISTORY:  Social History     Socioeconomic History     Marital status:      Spouse name: Lianna     Number of children: 4     Years of education: 16     Highest education level: Not on file   Occupational History     Occupation: [employer]     Employer: [employer]    Tobacco Use     Smoking status: Former     Packs/day: 0.20     Years: 40.00     Pack years: 8.00     Types: Cigarettes     Start date: 1968     Quit date: 1/1/2008     Years since quitting: 15.1     Smokeless tobacco: Never   Vaping Use     Vaping Use: Never used   Substance and Sexual Activity     Alcohol use: Not Currently     Alcohol/week: 35.0 standard drinks     Drug use: Not Currently     Sexual activity: Not on file   Other Topics Concern     Parent/sibling w/ CABG, MI or angioplasty before 65F 55M? Not Asked   Social History Narrative     Not on file     Social Determinants of Health     Financial Resource Strain: Not on file  "  Food Insecurity: Not on file   Transportation Needs: Not on file   Physical Activity: Not on file   Stress: Not on file   Social Connections: Not on file   Intimate Partner Violence: Not on file   Housing Stability: Not on file     Review of Systems:  Skin:  not assessed     Eyes:  not assessed    ENT:  not assessed    Respiratory:  Negative    Cardiovascular:  Negative    Gastroenterology: not assessed    Genitourinary:  not assessed    Musculoskeletal:  not assessed    Neurologic:  not assessed    Psychiatric:  not assessed    Heme/Lymph/Imm:  not assessed    Endocrine:  Positive for       Physical Exam:  Vitals: BP (!) 170/98 (BP Location: Left arm, Patient Position: Sitting, Cuff Size: Adult Regular)   Pulse 65   Ht 1.753 m (5' 9\")   Wt 69.3 kg (152 lb 12.8 oz)   SpO2 97%   BMI 22.56 kg/m     Wt Readings from Last 4 Encounters:   03/07/23 69.3 kg (152 lb 12.8 oz)   02/17/23 69.9 kg (154 lb)   02/16/23 69.9 kg (154 lb)   02/13/23 69.9 kg (154 lb 1.6 oz)     CONSTITUTIONAL: Appears his stated age, well nourished, and in no acute distress.  HEENT: Pupils equal, round. No scleral icterus.  NECK: Supple, no JVD.  C/V:  Irregularly irregular rhythm, controlled rate, no murmur, rub or gallop.   RESP: Respirations are unlabored. Lungs are clear to auscultation with no wheezes or crackles bilaterally.  EXTREM: Moves all extremities well and symmetrically. There is trace non-pitting LE edema in the ankles bilaterally.    NEURO: Alert and oriented, cooperative. Face symmetrical, no facial droop or gross focal deficits.   PSYCH: Affect appropriate. Mentation normal. Mildly forgetful with some confusion on his medications.   SKIN: Warm and dry. No apparent rashes or bruising.    Recent Lab Results:  LIPID RESULTS:  Lab Results   Component Value Date    CHOL 112 01/17/2023    CHOL 116 05/13/2020    HDL 50 01/17/2023    HDL 60 05/13/2020    LDL 52 01/17/2023    LDL 43 05/13/2020    TRIG 51 01/17/2023    TRIG 65 " 05/13/2020    CHOLHDLRATIO 2.7 03/09/2009     LIVER ENZYME RESULTS:  Lab Results   Component Value Date    AST 22 02/11/2023    AST 12 03/22/2021    ALT 11 02/11/2023    ALT 5 03/22/2021     CBC RESULTS:  Lab Results   Component Value Date    WBC 14.4 (H) 02/11/2023    WBC 10.4 06/15/2021    RBC 4.44 02/11/2023    RBC 4.08 (A) 06/15/2021    HGB 12.3 (L) 02/11/2023    HGB 11.7 (A) 06/15/2021    HCT 39.3 (L) 02/11/2023    HCT 36.3 (A) 06/15/2021    MCV 89 02/11/2023    MCV 89 06/15/2021    MCH 27.7 02/11/2023    MCH 28.7 06/15/2021    MCHC 31.3 (L) 02/11/2023    MCHC 32.2 06/15/2021    RDW 15.5 (H) 02/11/2023    RDW 14.2 06/15/2021     02/11/2023     06/15/2021     BMP RESULTS:  Lab Results   Component Value Date     02/11/2023     07/12/2021    POTASSIUM 3.6 02/11/2023    POTASSIUM 4.2 01/17/2023    POTASSIUM 4.0 07/12/2021    CHLORIDE 99 02/11/2023    CHLORIDE 105 01/17/2023    CHLORIDE 102 11/04/2021    CHLORIDE 100 07/12/2021    CO2 29 02/11/2023    CO2 25 01/17/2023    CO2 26 07/12/2021    ANIONGAP 9 02/11/2023    ANIONGAP 12 01/17/2023    ANIONGAP 4 03/18/2021     (H) 02/11/2023     (H) 02/11/2023     (H) 01/17/2023     (A) 07/12/2021    BUN 11.6 02/11/2023    BUN 36 (H) 01/17/2023    BUN 18 07/12/2021    BUN 10 07/12/2021    CR 1.15 02/11/2023    CR 1.73 (A) 07/12/2021    GFRESTIMATED 63 02/11/2023    GFRESTIMATED 40 (L) 06/21/2021    GFRESTBLACK 46 (L) 06/21/2021    LLOYD 9.2 02/11/2023    LLOYD 9.3 07/12/2021      A1C RESULTS:  Lab Results   Component Value Date    A1C 7.2 (H) 01/17/2023    A1C 7.7 (H) 11/20/2020     This note was completed in part using Dragon voice recognition software. Although reviewed after completion, some word and grammatical errors may occur.

## 2023-03-07 NOTE — PATIENT INSTRUCTIONS
Thank you for your visit with the [facility] today.    Today's plan:   Increase the dose of irbesartan from 75 mg to 150 mg once daily for better blood pressure control.  Check labs in 1-2 weeks after increasing the dose of irbesartan to recheck kidney function and electrolytes.   Come in for a nurse only visit in about 1-2 weeks to recheck your blood pressure. Bring in your home blood pressure monitors so that we can compare your home monitors to the clinic readings to ensure accuracy.  Continue to check your weights daily and try to stick to a low sodium diet (under 2,000 mg/day).  Call the [facility] nurse team at the number listed below if you develop signs concerning for fluid retention, including weight gain of over 3 pounds in 1 night or over 5 pounds in 1 week, increasing shortness of breath, or increasing swelling in the legs or abdomen.  Follow up Dr. Julien around July for an annual check in with her.    If you have questions or concerns, please do not hesitate to call my nurse team at [phone].     Scheduling phone number: [phone]    It was a pleasure seeing you today!     OMAR Whitten, CNP  Nurse Practitioner  [facility]  March 7, 2023  ________________________________________________________

## 2023-03-08 ENCOUNTER — TELEPHONE (OUTPATIENT)
Dept: CARDIOLOGY | Facility: CLINIC | Age: 83
End: 2023-03-08
Payer: COMMERCIAL

## 2023-03-08 DIAGNOSIS — I10 ESSENTIAL HYPERTENSION: Primary | ICD-10-CM

## 2023-03-08 NOTE — TELEPHONE ENCOUNTER
Start amlodipine 5 mg once daily. Can continue to monitor blood pressure with this and increase to 5 mg BID or 10 mg once daily if blood pressure remains significantly elevated. No need for the repeat BMP in 1-2 weeks as noted at the visit yesterday since we will not be adjusting the irbesartan for now, but would keep the nurse visit as planned for BP recheck. Thank you! OMAR Whitten, CNP

## 2023-03-08 NOTE — TELEPHONE ENCOUNTER
Hai placed to pt to review. No answer - unable to leave VM as line cuts out to a busy signal. Will retry call.   MADELINE Edmonds RN, BSN. 03/08/23 4:53 PM

## 2023-03-08 NOTE — TELEPHONE ENCOUNTER
Call received from RN with [facility] to review mediations changes and BP reading.     Per home care RN pt has been taking the following medicating doses since time of discharge from TCU :     ~ Irbesartan 150mg daily (for 14 days prior to office visit - pt missed a week of home care and since he did not have cut tablets he just took a full one)     ~ Coreg 25mg BID - this is the dose he has been taking since his DC form TCU.     BP today per RN reading 4 hours after AM medications 182/89, HR 76.     Per OV note 3/7/2023:   1. Increase the dose of irbesartan from 75 mg to 150 mg once daily for better blood pressure control.  - med list shows pt on coreg 12.5mg BID     Routing to provider to review updated medication doses and review of increases / changes needed. Pt does have BP check scheduled for 3/13/2023 and home care is scheduled for return on 3/14/2023.     MADELINE Edmonds RN, BSN. 03/08/23 03/08/23 3:17 PM

## 2023-03-09 RX ORDER — AMLODIPINE BESYLATE 5 MG/1
5 TABLET ORAL DAILY
Qty: 90 TABLET | Refills: 0 | Status: SHIPPED | OUTPATIENT
Start: 2023-03-09 | End: 2023-03-16 | Stop reason: DRUGHIGH

## 2023-03-14 ENCOUNTER — LAB (OUTPATIENT)
Dept: LAB | Facility: CLINIC | Age: 83
End: 2023-03-14
Payer: COMMERCIAL

## 2023-03-14 ENCOUNTER — ALLIED HEALTH/NURSE VISIT (OUTPATIENT)
Dept: CARDIOLOGY | Facility: CLINIC | Age: 83
End: 2023-03-14
Attending: NURSE PRACTITIONER
Payer: COMMERCIAL

## 2023-03-14 ENCOUNTER — TELEPHONE (OUTPATIENT)
Dept: CARDIOLOGY | Facility: CLINIC | Age: 83
End: 2023-03-14

## 2023-03-14 DIAGNOSIS — I10 ESSENTIAL HYPERTENSION: ICD-10-CM

## 2023-03-14 DIAGNOSIS — I10 ESSENTIAL HYPERTENSION: Primary | ICD-10-CM

## 2023-03-14 LAB
ANION GAP SERPL CALCULATED.3IONS-SCNC: 7 MMOL/L (ref 7–15)
BUN SERPL-MCNC: 14 MG/DL (ref 8–23)
CALCIUM SERPL-MCNC: 9.3 MG/DL (ref 8.8–10.2)
CHLORIDE SERPL-SCNC: 100 MMOL/L (ref 98–107)
CREAT SERPL-MCNC: 1.21 MG/DL (ref 0.67–1.17)
DEPRECATED HCO3 PLAS-SCNC: 35 MMOL/L (ref 22–29)
GFR SERPL CREATININE-BSD FRML MDRD: 59 ML/MIN/1.73M2
GLUCOSE SERPL-MCNC: 251 MG/DL (ref 70–99)
POTASSIUM SERPL-SCNC: 3.6 MMOL/L (ref 3.4–5.3)
SODIUM SERPL-SCNC: 142 MMOL/L (ref 136–145)

## 2023-03-14 PROCEDURE — 36415 COLL VENOUS BLD VENIPUNCTURE: CPT | Performed by: NURSE PRACTITIONER

## 2023-03-14 PROCEDURE — 99207 PR NO CHARGE NURSE ONLY: CPT | Performed by: NURSE PRACTITIONER

## 2023-03-14 PROCEDURE — 80048 BASIC METABOLIC PNL TOTAL CA: CPT | Performed by: NURSE PRACTITIONER

## 2023-03-14 NOTE — TELEPHONE ENCOUNTER
LOV 3/7/2023 with Rodrigo Ponce CNP:   - Increase irbesartan from 75 mg to 150 mg once daily for better blood pressure control. Readings mostly under 140/90 is a reasonable target for him given frailty, hx of falls, and labile readings likely with a component of orthostatic hypotension.   - Check a BMP in 1-2 weeks with a nurse only visit for reassessment of his blood pressure. Recommended he bring in his home blood pressure cuffs with him for comparison to the clinic readings (states he has both a wrist cuff and upper arm cuff he sometimes uses).    Tele encoutner 3/8/23:   Call from home care RN to report medication differences. Pt advised to start amlodipine 5mg daily:   - Start amlodipine 5 mg once daily. Can continue to monitor blood pressure with this and increase to 5 mg BID or 10 mg once daily if blood pressure remains significantly elevated. No need for the repeat BMP in 1-2 weeks as noted at the visit yesterday since we will not be adjusting the irbesartan for now, but would keep the nurse visit as planned for BP recheck. Thank you! Rodrigo Ponce, APRN, CNP     BP check today:   Last office visit: 3/7/23  Previous blood pressure:   170 98   Mm Hg   Previous heart rate:  65    bpm  Time of readin:18      Morning medications were taken at: Yes     Today's blood pressure:    183/75   mm Hg (lowest of series of 3; 193/87, 188/83, 183/75)  Today's heart rate:   61    bpm     Home care RN scheduled to see pt 3/15/2023.   Routing to provider for review.   MADELINE Edmonds RN, BSN.

## 2023-03-14 NOTE — LETTER
March 16, 2023       TO: Tc Garcia   [id number] [facility] [address]       Dear Mr. GarciaRodrigo CNP has reviewed the results of your recent blood work an blood pressure check you had completed in the office. We are recommending you increase your amlodipine 5mg to twice daily.     I have been trying to reach you by phone but have not been able to do so. I have included an updated medication list for you to ensure you are taking your medications correctly. Please reach out to me when you recieve this letter .   Thank you     Please feel free to contact the Nurse line at [phone] with any questions or concerns.  Ame RUFFIN, BSN     Results for orders placed or performed in visit on 03/14/23   Basic metabolic panel     Status: Abnormal   Result Value Ref Range    Sodium 142 136 - 145 mmol/L    Potassium 3.6 3.4 - 5.3 mmol/L    Chloride 100 98 - 107 mmol/L    Carbon Dioxide (CO2) 35 (H) 22 - 29 mmol/L    Anion Gap 7 7 - 15 mmol/L    Urea Nitrogen 14.0 8.0 - 23.0 mg/dL    Creatinine 1.21 (H) 0.67 - 1.17 mg/dL    Calcium 9.3 8.8 - 10.2 mg/dL    Glucose 251 (H) 70 - 99 mg/dL    GFR Estimate 59 (L) >60 mL/min/1.73m2         Sincerely,    [facility]

## 2023-03-14 NOTE — TELEPHONE ENCOUNTER
Recommend increasing amlodipine to 5 mg twice daily.  Also please update him that the labs which ended up being checked today and are stable near his previous baseline. Would continue to monitor blood pressure with the home care team and defer to primary care for further BP management. Thank you! Rodrigo Ponce, APRN, CNP

## 2023-03-14 NOTE — PROGRESS NOTES
Last office visit: 3/7/23    Previous blood pressure:   170 98   Mm Hg     Previous heart rate:  65    bpm    Time of readin:18    Morning medications were taken at: Yes    Today's blood pressure:    183/75   mm Hg (lowest of series of 3; 193/87, 188/83, 183/75)    Today's heart rate:   61    bpm     Ordering Provider: Rodrigo Ponce NP    Results sent to team # : Ame Edmonds RN    Additional comments:

## 2023-03-15 NOTE — TELEPHONE ENCOUNTER
Call placed to pt to review recommendations. No answer - unable to leave VM as line goes to a busy signal. Will try recall.   MADELINE Edmonds RN, BSN. 03/15/23 2:33 PM     Second call placed to pt  - no answer - unable to leave VM. Letter sent to pt with call back information and updated medication list with notation to call once received.   MADELINE Edmonds RN, BSN. 03/16/23 3:51 PM     Call placed to pt. Pt verbalized understanding to increase amlodipine to 5mg BID. Advised pt I have sent out an updated medication list for him to review with home care RN. Pt unsure when next home care visit is planned for. Pt report BP has been fine but does not have list available for review. Pt advised to touch base with PMD regarding continued elevations. Pt verbalized understanding. Advised pt to call home care agency to review when next appointment is.   MADELINE Edmonds RN, BSN.

## 2023-03-16 RX ORDER — AMLODIPINE BESYLATE 5 MG/1
5 TABLET ORAL 2 TIMES DAILY
Qty: 180 TABLET | Refills: 1 | Status: ON HOLD | OUTPATIENT
Start: 2023-03-16 | End: 2023-04-24

## 2023-03-22 NOTE — TELEPHONE ENCOUNTER
Pt called to report BP today 126/67, recheck this afternoon was 127/66.   Pt tolerating increased amlodipine dose without any noted edam or side effects. Will have pt continue to monitor BP. Pt to call if BP elevations notes.   MADELINE Edmonds RN, BSN. 03/22/23  3:33 PM

## 2023-04-07 ENCOUNTER — TELEPHONE (OUTPATIENT)
Dept: CARDIOLOGY | Facility: CLINIC | Age: 83
End: 2023-04-07

## 2023-04-14 ENCOUNTER — TELEPHONE (OUTPATIENT)
Dept: CARDIOLOGY | Facility: CLINIC | Age: 83
End: 2023-04-14
Payer: COMMERCIAL

## 2023-04-14 NOTE — TELEPHONE ENCOUNTER
Spoke with triage RN at [facility] and let her know that our records do not indicate that patient is on Eliquis.  RN states no further information is needed.  Staci Bruce RN on 4/14/2023 at 3:19 PM

## 2023-04-14 NOTE — TELEPHONE ENCOUNTER
[facility] Call Center    Phone Message    May a detailed message be left on voicemail: yes     Reason for Call: Other: Corin with [facility] office called in stating they need documentation showing that he is off of eliquis, please reach out to [phone] ext [phone] to discuss.     Action Taken: Other: Cardiology    Travel Screening: Not Applicable     Thank you!  [facility]

## 2023-04-20 ENCOUNTER — HOSPITAL ENCOUNTER (INPATIENT)
Facility: CLINIC | Age: 83
LOS: 12 days | Discharge: HOME HEALTH | DRG: 638 | End: 2023-05-02
Attending: EMERGENCY MEDICINE | Admitting: HOSPITALIST
Payer: COMMERCIAL

## 2023-04-20 ENCOUNTER — APPOINTMENT (OUTPATIENT)
Dept: GENERAL RADIOLOGY | Facility: CLINIC | Age: 83
DRG: 638 | End: 2023-04-20
Attending: EMERGENCY MEDICINE
Payer: COMMERCIAL

## 2023-04-20 DIAGNOSIS — E11.69 TYPE 2 DIABETES MELLITUS WITH OTHER SPECIFIED COMPLICATION, WITH LONG-TERM CURRENT USE OF INSULIN (H): ICD-10-CM

## 2023-04-20 DIAGNOSIS — I50.32 CHRONIC HEART FAILURE WITH PRESERVED EJECTION FRACTION (HFPEF) (H): ICD-10-CM

## 2023-04-20 DIAGNOSIS — I48.20 CHRONIC ATRIAL FIBRILLATION (H): ICD-10-CM

## 2023-04-20 DIAGNOSIS — I48.11 LONGSTANDING PERSISTENT ATRIAL FIBRILLATION (H): ICD-10-CM

## 2023-04-20 DIAGNOSIS — Z79.4 TYPE 2 DIABETES MELLITUS WITH OTHER SPECIFIED COMPLICATION, WITH LONG-TERM CURRENT USE OF INSULIN (H): ICD-10-CM

## 2023-04-20 DIAGNOSIS — R52 PAIN: ICD-10-CM

## 2023-04-20 DIAGNOSIS — E78.2 MIXED HYPERLIPIDEMIA: ICD-10-CM

## 2023-04-20 DIAGNOSIS — I27.20 PULMONARY HYPERTENSION (H): ICD-10-CM

## 2023-04-20 DIAGNOSIS — I07.1 TRICUSPID VALVE INSUFFICIENCY, UNSPECIFIED ETIOLOGY: ICD-10-CM

## 2023-04-20 DIAGNOSIS — N18.32 STAGE 3B CHRONIC KIDNEY DISEASE (H): ICD-10-CM

## 2023-04-20 DIAGNOSIS — I50.32 CHRONIC DIASTOLIC HEART FAILURE (H): ICD-10-CM

## 2023-04-20 DIAGNOSIS — I50.9 CONGESTIVE HEART FAILURE, UNSPECIFIED HF CHRONICITY, UNSPECIFIED HEART FAILURE TYPE (H): ICD-10-CM

## 2023-04-20 DIAGNOSIS — R33.9 URINARY RETENTION: ICD-10-CM

## 2023-04-20 DIAGNOSIS — I10 ESSENTIAL HYPERTENSION: ICD-10-CM

## 2023-04-20 DIAGNOSIS — E08.10 DIABETIC KETOACIDOSIS WITHOUT COMA ASSOCIATED WITH DIABETES MELLITUS DUE TO UNDERLYING CONDITION (H): Primary | ICD-10-CM

## 2023-04-20 DIAGNOSIS — E11.10 DIABETIC KETOACIDOSIS WITHOUT COMA ASSOCIATED WITH TYPE 2 DIABETES MELLITUS (H): ICD-10-CM

## 2023-04-20 LAB
ALBUMIN SERPL BCG-MCNC: 4.3 G/DL (ref 3.5–5.2)
ALP SERPL-CCNC: 92 U/L (ref 40–129)
ALT SERPL W P-5'-P-CCNC: 6 U/L (ref 10–50)
ANION GAP SERPL CALCULATED.3IONS-SCNC: 23 MMOL/L (ref 7–15)
AST SERPL W P-5'-P-CCNC: 16 U/L (ref 10–50)
ATRIAL RATE - MUSE: NORMAL BPM
B-OH-BUTYR SERPL-SCNC: 3.8 MMOL/L
BASE EXCESS BLDV CALC-SCNC: -6 MMOL/L (ref -7.7–1.9)
BASOPHILS # BLD AUTO: 0.1 10E3/UL (ref 0–0.2)
BASOPHILS NFR BLD AUTO: 1 %
BILIRUB SERPL-MCNC: 1.1 MG/DL
BUN SERPL-MCNC: 30.5 MG/DL (ref 8–23)
CALCIUM SERPL-MCNC: 9.3 MG/DL (ref 8.8–10.2)
CHLORIDE SERPL-SCNC: 96 MMOL/L (ref 98–107)
CREAT SERPL-MCNC: 1.72 MG/DL (ref 0.67–1.17)
DEPRECATED HCO3 PLAS-SCNC: 18 MMOL/L (ref 22–29)
DIASTOLIC BLOOD PRESSURE - MUSE: NORMAL MMHG
EOSINOPHIL # BLD AUTO: 0 10E3/UL (ref 0–0.7)
EOSINOPHIL NFR BLD AUTO: 0 %
ERYTHROCYTE [DISTWIDTH] IN BLOOD BY AUTOMATED COUNT: 14.6 % (ref 10–15)
GFR SERPL CREATININE-BSD FRML MDRD: 39 ML/MIN/1.73M2
GLUCOSE BLDC GLUCOMTR-MCNC: 293 MG/DL (ref 70–99)
GLUCOSE BLDC GLUCOMTR-MCNC: 366 MG/DL (ref 70–99)
GLUCOSE BLDC GLUCOMTR-MCNC: 391 MG/DL (ref 70–99)
GLUCOSE SERPL-MCNC: 547 MG/DL (ref 70–99)
HBA1C MFR BLD: 8 %
HCO3 BLDV-SCNC: 20 MMOL/L (ref 21–28)
HCT VFR BLD AUTO: 37.2 % (ref 40–53)
HGB BLD-MCNC: 11.8 G/DL (ref 13.3–17.7)
IMM GRANULOCYTES # BLD: 0.1 10E3/UL
IMM GRANULOCYTES NFR BLD: 1 %
INTERPRETATION ECG - MUSE: NORMAL
LACTATE SERPL-SCNC: 2.9 MMOL/L (ref 0.7–2)
LACTATE SERPL-SCNC: 3.4 MMOL/L (ref 0.7–2)
LYMPHOCYTES # BLD AUTO: 1 10E3/UL (ref 0.8–5.3)
LYMPHOCYTES NFR BLD AUTO: 9 %
MAGNESIUM SERPL-MCNC: 2.2 MG/DL (ref 1.7–2.3)
MCH RBC QN AUTO: 27.8 PG (ref 26.5–33)
MCHC RBC AUTO-ENTMCNC: 31.7 G/DL (ref 31.5–36.5)
MCV RBC AUTO: 88 FL (ref 78–100)
MONOCYTES # BLD AUTO: 0.8 10E3/UL (ref 0–1.3)
MONOCYTES NFR BLD AUTO: 7 %
NEUTROPHILS # BLD AUTO: 9.6 10E3/UL (ref 1.6–8.3)
NEUTROPHILS NFR BLD AUTO: 82 %
NRBC # BLD AUTO: 0 10E3/UL
NRBC BLD AUTO-RTO: 0 /100
O2/TOTAL GAS SETTING VFR VENT: 99 %
OSMOLALITY SERPL: 315 MMOL/KG (ref 280–301)
P AXIS - MUSE: NORMAL DEGREES
PCO2 BLDV: 40 MM HG (ref 40–50)
PH BLDV: 7.31 [PH] (ref 7.32–7.43)
PHOSPHATE SERPL-MCNC: 3.1 MG/DL (ref 2.5–4.5)
PLATELET # BLD AUTO: 238 10E3/UL (ref 150–450)
PO2 BLDV: 43 MM HG (ref 25–47)
POTASSIUM SERPL-SCNC: 4.5 MMOL/L (ref 3.4–5.3)
PR INTERVAL - MUSE: NORMAL MS
PROT SERPL-MCNC: 6.1 G/DL (ref 6.4–8.3)
QRS DURATION - MUSE: 98 MS
QT - MUSE: 418 MS
QTC - MUSE: 473 MS
R AXIS - MUSE: 123 DEGREES
RBC # BLD AUTO: 4.25 10E6/UL (ref 4.4–5.9)
SODIUM SERPL-SCNC: 137 MMOL/L (ref 136–145)
SYSTOLIC BLOOD PRESSURE - MUSE: NORMAL MMHG
T AXIS - MUSE: -27 DEGREES
VENTRICULAR RATE- MUSE: 77 BPM
WBC # BLD AUTO: 11.6 10E3/UL (ref 4–11)

## 2023-04-20 PROCEDURE — 36415 COLL VENOUS BLD VENIPUNCTURE: CPT | Performed by: EMERGENCY MEDICINE

## 2023-04-20 PROCEDURE — 96365 THER/PROPH/DIAG IV INF INIT: CPT

## 2023-04-20 PROCEDURE — 85004 AUTOMATED DIFF WBC COUNT: CPT | Performed by: EMERGENCY MEDICINE

## 2023-04-20 PROCEDURE — 85025 COMPLETE CBC W/AUTO DIFF WBC: CPT | Performed by: EMERGENCY MEDICINE

## 2023-04-20 PROCEDURE — 82962 GLUCOSE BLOOD TEST: CPT

## 2023-04-20 PROCEDURE — 83036 HEMOGLOBIN GLYCOSYLATED A1C: CPT | Performed by: EMERGENCY MEDICINE

## 2023-04-20 PROCEDURE — 258N000003 HC RX IP 258 OP 636: Performed by: EMERGENCY MEDICINE

## 2023-04-20 PROCEDURE — 250N000012 HC RX MED GY IP 250 OP 636 PS 637: Performed by: EMERGENCY MEDICINE

## 2023-04-20 PROCEDURE — 99223 1ST HOSP IP/OBS HIGH 75: CPT | Performed by: HOSPITALIST

## 2023-04-20 PROCEDURE — 71046 X-RAY EXAM CHEST 2 VIEWS: CPT

## 2023-04-20 PROCEDURE — 80053 COMPREHEN METABOLIC PANEL: CPT | Performed by: EMERGENCY MEDICINE

## 2023-04-20 PROCEDURE — 83605 ASSAY OF LACTIC ACID: CPT | Performed by: EMERGENCY MEDICINE

## 2023-04-20 PROCEDURE — 120N000013 HC R&B IMCU

## 2023-04-20 PROCEDURE — 82803 BLOOD GASES ANY COMBINATION: CPT | Performed by: EMERGENCY MEDICINE

## 2023-04-20 PROCEDURE — 258N000002 HC RX IP 258 OP 250: Performed by: EMERGENCY MEDICINE

## 2023-04-20 PROCEDURE — 120N000001 HC R&B MED SURG/OB

## 2023-04-20 PROCEDURE — 83735 ASSAY OF MAGNESIUM: CPT | Performed by: EMERGENCY MEDICINE

## 2023-04-20 PROCEDURE — 84100 ASSAY OF PHOSPHORUS: CPT | Performed by: EMERGENCY MEDICINE

## 2023-04-20 PROCEDURE — 82010 KETONE BODYS QUAN: CPT | Performed by: EMERGENCY MEDICINE

## 2023-04-20 PROCEDURE — 250N000011 HC RX IP 250 OP 636: Performed by: EMERGENCY MEDICINE

## 2023-04-20 PROCEDURE — 99285 EMERGENCY DEPT VISIT HI MDM: CPT | Mod: 25

## 2023-04-20 PROCEDURE — 83930 ASSAY OF BLOOD OSMOLALITY: CPT | Performed by: EMERGENCY MEDICINE

## 2023-04-20 RX ORDER — SODIUM CHLORIDE 450 MG/100ML
1000 INJECTION, SOLUTION INTRAVENOUS CONTINUOUS
Status: DISCONTINUED | OUTPATIENT
Start: 2023-04-20 | End: 2023-04-21

## 2023-04-20 RX ORDER — TRAMADOL HYDROCHLORIDE 50 MG/1
TABLET ORAL
Status: ON HOLD | COMMUNITY
Start: 2023-04-18 | End: 2023-04-24

## 2023-04-20 RX ORDER — PIPERACILLIN SODIUM, TAZOBACTAM SODIUM 4; .5 G/20ML; G/20ML
4.5 INJECTION, POWDER, LYOPHILIZED, FOR SOLUTION INTRAVENOUS ONCE
Status: COMPLETED | OUTPATIENT
Start: 2023-04-20 | End: 2023-04-20

## 2023-04-20 RX ORDER — INSULIN LISPRO 100 [IU]/ML
INJECTION, SOLUTION INTRAVENOUS; SUBCUTANEOUS
Status: ON HOLD | COMMUNITY
Start: 2023-03-27 | End: 2023-01-01

## 2023-04-20 RX ORDER — POTASSIUM CHLORIDE 7.45 MG/ML
10 INJECTION INTRAVENOUS ONCE
Status: COMPLETED | OUTPATIENT
Start: 2023-04-20 | End: 2023-04-20

## 2023-04-20 RX ORDER — DEXTROSE MONOHYDRATE 25 G/50ML
25-50 INJECTION, SOLUTION INTRAVENOUS
Status: DISCONTINUED | OUTPATIENT
Start: 2023-04-20 | End: 2023-04-21

## 2023-04-20 RX ADMIN — PIPERACILLIN AND TAZOBACTAM 4.5 G: 4; .5 INJECTION, POWDER, FOR SOLUTION INTRAVENOUS at 21:26

## 2023-04-20 RX ADMIN — SODIUM CHLORIDE 1000 ML: 9 INJECTION, SOLUTION INTRAVENOUS at 20:06

## 2023-04-20 RX ADMIN — SODIUM CHLORIDE 1000 ML: 4.5 INJECTION, SOLUTION INTRAVENOUS at 20:34

## 2023-04-20 RX ADMIN — POTASSIUM CHLORIDE 10 MEQ: 7.46 INJECTION, SOLUTION INTRAVENOUS at 19:40

## 2023-04-20 RX ADMIN — SODIUM CHLORIDE 1000 ML: 9 INJECTION, SOLUTION INTRAVENOUS at 19:23

## 2023-04-20 RX ADMIN — SODIUM CHLORIDE: 9 INJECTION, SOLUTION INTRAVENOUS at 20:41

## 2023-04-20 ASSESSMENT — ENCOUNTER SYMPTOMS
WEAKNESS: 1
APPETITE CHANGE: 1
FEVER: 0

## 2023-04-20 ASSESSMENT — ACTIVITIES OF DAILY LIVING (ADL)
ADLS_ACUITY_SCORE: 37
ADLS_ACUITY_SCORE: 37

## 2023-04-20 NOTE — ED TRIAGE NOTES
pt noticed his bgl was high around 500 at 10 pm lasst night   Pt gave himself some insulin and it did not change so he decided to come in

## 2023-04-20 NOTE — ED NOTES
Rapid Assessment Note    History:   Tc Garcia is a 83 year old male with history of type 2 diabetes mellitus, insulin dependent, presents with hyperglycemia, with blood sugar around 500. The patient reports elevated blood sugar last night before going to bed. He used insulin, but woke up overnight and it was still high. He called his doctor and was advised to come to the emergency department. He reports that yesterday he felt much weaker, but denies other symptoms. No recent fevers, but he notes decreased appetite. The patient reports a spinal injection two days ago, but no steroids were involved. He denies any change in his insulin routine.     Exam:   General:  Alert, interactive  Cardiovascular:  Well perfused  Lungs:  No respiratory distress, no accessory muscle use  Neuro:  Moving all 4 extremities  Skin:  Warm, dry  Psych:  Normal affect      Plan of Care:   I evaluated the patient and developed an initial plan of care. I discussed this plan and explained that I, or one of my partners, would be returning to complete the evaluation.       4/20/2023  [employer]    Portions of this medical record were completed by a scribe. UPON MY REVIEW AND AUTHENTICATION BY ELECTRONIC SIGNATURE, this confirms (a) I performed the applicable clinical services, and (b) the record is accurate.        Cathi Frost MD  04/20/23 [id number]

## 2023-04-20 NOTE — ED NOTES
DATE:  4/20/2023   TIME OF RECEIPT FROM LAB:  1858  LAB TEST:  Glucose and ketones  LAB VALUE:  Gluc 547, ketones 3.80  RESULTS GIVEN WITH READ-BACK TO (PROVIDER):  Data Unavailable  TIME LAB VALUE REPORTED TO PROVIDER:   1900

## 2023-04-21 ENCOUNTER — APPOINTMENT (OUTPATIENT)
Dept: CARDIOLOGY | Facility: CLINIC | Age: 83
DRG: 638 | End: 2023-04-21
Attending: HOSPITALIST
Payer: COMMERCIAL

## 2023-04-21 LAB
ANION GAP SERPL CALCULATED.3IONS-SCNC: 11 MMOL/L (ref 7–15)
ANION GAP SERPL CALCULATED.3IONS-SCNC: 12 MMOL/L (ref 7–15)
ANION GAP SERPL CALCULATED.3IONS-SCNC: 14 MMOL/L (ref 7–15)
ANION GAP SERPL CALCULATED.3IONS-SCNC: 9 MMOL/L (ref 7–15)
B-OH-BUTYR SERPL-SCNC: 0.8 MMOL/L
B-OH-BUTYR SERPL-SCNC: 1.2 MMOL/L
B-OH-BUTYR SERPL-SCNC: 1.7 MMOL/L
B-OH-BUTYR SERPL-SCNC: <0.2 MMOL/L
BUN SERPL-MCNC: 25.1 MG/DL (ref 8–23)
BUN SERPL-MCNC: 25.9 MG/DL (ref 8–23)
BUN SERPL-MCNC: 27.7 MG/DL (ref 8–23)
BUN SERPL-MCNC: 27.8 MG/DL (ref 8–23)
BUN SERPL-MCNC: 27.8 MG/DL (ref 8–23)
BUN SERPL-MCNC: 28.4 MG/DL (ref 8–23)
CALCIUM SERPL-MCNC: 8.5 MG/DL (ref 8.8–10.2)
CALCIUM SERPL-MCNC: 8.6 MG/DL (ref 8.8–10.2)
CALCIUM SERPL-MCNC: 8.6 MG/DL (ref 8.8–10.2)
CALCIUM SERPL-MCNC: 8.7 MG/DL (ref 8.8–10.2)
CALCIUM SERPL-MCNC: 8.9 MG/DL (ref 8.8–10.2)
CALCIUM SERPL-MCNC: 9.3 MG/DL (ref 8.8–10.2)
CHLORIDE SERPL-SCNC: 101 MMOL/L (ref 98–107)
CHLORIDE SERPL-SCNC: 103 MMOL/L (ref 98–107)
CHLORIDE SERPL-SCNC: 104 MMOL/L (ref 98–107)
CHLORIDE SERPL-SCNC: 105 MMOL/L (ref 98–107)
CHLORIDE SERPL-SCNC: 105 MMOL/L (ref 98–107)
CHLORIDE SERPL-SCNC: 106 MMOL/L (ref 98–107)
CREAT SERPL-MCNC: 1.49 MG/DL (ref 0.67–1.17)
CREAT SERPL-MCNC: 1.55 MG/DL (ref 0.67–1.17)
CREAT SERPL-MCNC: 1.55 MG/DL (ref 0.67–1.17)
CREAT SERPL-MCNC: 1.58 MG/DL (ref 0.67–1.17)
CREAT SERPL-MCNC: 1.62 MG/DL (ref 0.67–1.17)
CREAT SERPL-MCNC: 1.63 MG/DL (ref 0.67–1.17)
DEPRECATED HCO3 PLAS-SCNC: 22 MMOL/L (ref 22–29)
DEPRECATED HCO3 PLAS-SCNC: 23 MMOL/L (ref 22–29)
DEPRECATED HCO3 PLAS-SCNC: 26 MMOL/L (ref 22–29)
GFR SERPL CREATININE-BSD FRML MDRD: 41 ML/MIN/1.73M2
GFR SERPL CREATININE-BSD FRML MDRD: 42 ML/MIN/1.73M2
GFR SERPL CREATININE-BSD FRML MDRD: 43 ML/MIN/1.73M2
GFR SERPL CREATININE-BSD FRML MDRD: 44 ML/MIN/1.73M2
GFR SERPL CREATININE-BSD FRML MDRD: 44 ML/MIN/1.73M2
GFR SERPL CREATININE-BSD FRML MDRD: 46 ML/MIN/1.73M2
GLUCOSE BLDC GLUCOMTR-MCNC: 126 MG/DL (ref 70–99)
GLUCOSE BLDC GLUCOMTR-MCNC: 129 MG/DL (ref 70–99)
GLUCOSE BLDC GLUCOMTR-MCNC: 147 MG/DL (ref 70–99)
GLUCOSE BLDC GLUCOMTR-MCNC: 164 MG/DL (ref 70–99)
GLUCOSE BLDC GLUCOMTR-MCNC: 171 MG/DL (ref 70–99)
GLUCOSE BLDC GLUCOMTR-MCNC: 174 MG/DL (ref 70–99)
GLUCOSE BLDC GLUCOMTR-MCNC: 177 MG/DL (ref 70–99)
GLUCOSE BLDC GLUCOMTR-MCNC: 186 MG/DL (ref 70–99)
GLUCOSE BLDC GLUCOMTR-MCNC: 207 MG/DL (ref 70–99)
GLUCOSE BLDC GLUCOMTR-MCNC: 207 MG/DL (ref 70–99)
GLUCOSE BLDC GLUCOMTR-MCNC: 216 MG/DL (ref 70–99)
GLUCOSE BLDC GLUCOMTR-MCNC: 243 MG/DL (ref 70–99)
GLUCOSE BLDC GLUCOMTR-MCNC: 251 MG/DL (ref 70–99)
GLUCOSE BLDC GLUCOMTR-MCNC: 260 MG/DL (ref 70–99)
GLUCOSE BLDC GLUCOMTR-MCNC: 64 MG/DL (ref 70–99)
GLUCOSE BLDC GLUCOMTR-MCNC: 73 MG/DL (ref 70–99)
GLUCOSE BLDC GLUCOMTR-MCNC: 75 MG/DL (ref 70–99)
GLUCOSE BLDC GLUCOMTR-MCNC: 80 MG/DL (ref 70–99)
GLUCOSE BLDC GLUCOMTR-MCNC: 92 MG/DL (ref 70–99)
GLUCOSE BLDC GLUCOMTR-MCNC: 94 MG/DL (ref 70–99)
GLUCOSE BLDC GLUCOMTR-MCNC: 98 MG/DL (ref 70–99)
GLUCOSE SERPL-MCNC: 128 MG/DL (ref 70–99)
GLUCOSE SERPL-MCNC: 138 MG/DL (ref 70–99)
GLUCOSE SERPL-MCNC: 220 MG/DL (ref 70–99)
GLUCOSE SERPL-MCNC: 263 MG/DL (ref 70–99)
GLUCOSE SERPL-MCNC: 75 MG/DL (ref 70–99)
GLUCOSE SERPL-MCNC: 89 MG/DL (ref 70–99)
LVEF ECHO: NORMAL
MAGNESIUM SERPL-MCNC: 1.9 MG/DL (ref 1.7–2.3)
PHOSPHATE SERPL-MCNC: 2 MG/DL (ref 2.5–4.5)
POTASSIUM SERPL-SCNC: 3.6 MMOL/L (ref 3.4–5.3)
POTASSIUM SERPL-SCNC: 3.8 MMOL/L (ref 3.4–5.3)
POTASSIUM SERPL-SCNC: 3.9 MMOL/L (ref 3.4–5.3)
POTASSIUM SERPL-SCNC: 4 MMOL/L (ref 3.4–5.3)
POTASSIUM SERPL-SCNC: 4.1 MMOL/L (ref 3.4–5.3)
POTASSIUM SERPL-SCNC: 4.2 MMOL/L (ref 3.4–5.3)
SODIUM SERPL-SCNC: 137 MMOL/L (ref 136–145)
SODIUM SERPL-SCNC: 138 MMOL/L (ref 136–145)
SODIUM SERPL-SCNC: 139 MMOL/L (ref 136–145)
SODIUM SERPL-SCNC: 140 MMOL/L (ref 136–145)

## 2023-04-21 PROCEDURE — 82010 KETONE BODYS QUAN: CPT | Performed by: INTERNAL MEDICINE

## 2023-04-21 PROCEDURE — 82803 BLOOD GASES ANY COMBINATION: CPT | Performed by: INTERNAL MEDICINE

## 2023-04-21 PROCEDURE — 99232 SBSQ HOSP IP/OBS MODERATE 35: CPT | Performed by: INTERNAL MEDICINE

## 2023-04-21 PROCEDURE — 82310 ASSAY OF CALCIUM: CPT | Performed by: INTERNAL MEDICINE

## 2023-04-21 PROCEDURE — 258N000002 HC RX IP 258 OP 250: Performed by: INTERNAL MEDICINE

## 2023-04-21 PROCEDURE — 36415 COLL VENOUS BLD VENIPUNCTURE: CPT | Performed by: HOSPITALIST

## 2023-04-21 PROCEDURE — 82010 KETONE BODYS QUAN: CPT | Performed by: HOSPITALIST

## 2023-04-21 PROCEDURE — 250N000012 HC RX MED GY IP 250 OP 636 PS 637: Performed by: INTERNAL MEDICINE

## 2023-04-21 PROCEDURE — 120N000013 HC R&B IMCU

## 2023-04-21 PROCEDURE — 250N000013 HC RX MED GY IP 250 OP 250 PS 637: Performed by: HOSPITALIST

## 2023-04-21 PROCEDURE — 36415 COLL VENOUS BLD VENIPUNCTURE: CPT | Performed by: INTERNAL MEDICINE

## 2023-04-21 PROCEDURE — 93306 TTE W/DOPPLER COMPLETE: CPT

## 2023-04-21 PROCEDURE — 83735 ASSAY OF MAGNESIUM: CPT | Performed by: HOSPITALIST

## 2023-04-21 PROCEDURE — 258N000003 HC RX IP 258 OP 636: Performed by: HOSPITALIST

## 2023-04-21 PROCEDURE — 120N000001 HC R&B MED SURG/OB

## 2023-04-21 PROCEDURE — 250N000012 HC RX MED GY IP 250 OP 636 PS 637: Performed by: HOSPITALIST

## 2023-04-21 PROCEDURE — 93306 TTE W/DOPPLER COMPLETE: CPT | Mod: 26 | Performed by: INTERNAL MEDICINE

## 2023-04-21 PROCEDURE — 84100 ASSAY OF PHOSPHORUS: CPT | Performed by: HOSPITALIST

## 2023-04-21 PROCEDURE — 80048 BASIC METABOLIC PNL TOTAL CA: CPT | Performed by: HOSPITALIST

## 2023-04-21 RX ORDER — PROCHLORPERAZINE MALEATE 5 MG
5 TABLET ORAL EVERY 6 HOURS PRN
Status: DISCONTINUED | OUTPATIENT
Start: 2023-04-21 | End: 2023-05-02 | Stop reason: HOSPADM

## 2023-04-21 RX ORDER — DEXTROSE MONOHYDRATE, SODIUM CHLORIDE, AND POTASSIUM CHLORIDE 50; 1.49; 4.5 G/1000ML; G/1000ML; G/1000ML
INJECTION, SOLUTION INTRAVENOUS CONTINUOUS
Status: DISCONTINUED | OUTPATIENT
Start: 2023-04-21 | End: 2023-04-21

## 2023-04-21 RX ORDER — ONDANSETRON 4 MG/1
4 TABLET, ORALLY DISINTEGRATING ORAL EVERY 6 HOURS PRN
Status: DISCONTINUED | OUTPATIENT
Start: 2023-04-21 | End: 2023-05-02 | Stop reason: HOSPADM

## 2023-04-21 RX ORDER — LIDOCAINE 40 MG/G
CREAM TOPICAL
Status: DISCONTINUED | OUTPATIENT
Start: 2023-04-21 | End: 2023-04-21

## 2023-04-21 RX ORDER — SODIUM CHLORIDE 450 MG/100ML
INJECTION, SOLUTION INTRAVENOUS CONTINUOUS
Status: DISCONTINUED | OUTPATIENT
Start: 2023-04-21 | End: 2023-04-21

## 2023-04-21 RX ORDER — ACETAMINOPHEN 325 MG/1
650 TABLET ORAL EVERY 6 HOURS PRN
Status: DISCONTINUED | OUTPATIENT
Start: 2023-04-21 | End: 2023-05-02 | Stop reason: HOSPADM

## 2023-04-21 RX ORDER — NICOTINE POLACRILEX 4 MG
15-30 LOZENGE BUCCAL
Status: DISCONTINUED | OUTPATIENT
Start: 2023-04-21 | End: 2023-04-21

## 2023-04-21 RX ORDER — PROCHLORPERAZINE 25 MG
12.5 SUPPOSITORY, RECTAL RECTAL EVERY 12 HOURS PRN
Status: DISCONTINUED | OUTPATIENT
Start: 2023-04-21 | End: 2023-05-02 | Stop reason: HOSPADM

## 2023-04-21 RX ORDER — DEXTROSE MONOHYDRATE 25 G/50ML
25-50 INJECTION, SOLUTION INTRAVENOUS
Status: DISCONTINUED | OUTPATIENT
Start: 2023-04-21 | End: 2023-04-22

## 2023-04-21 RX ORDER — DEXTROSE MONOHYDRATE 25 G/50ML
25-50 INJECTION, SOLUTION INTRAVENOUS
Status: DISCONTINUED | OUTPATIENT
Start: 2023-04-21 | End: 2023-04-21

## 2023-04-21 RX ORDER — SODIUM CHLORIDE 450 MG/100ML
INJECTION, SOLUTION INTRAVENOUS CONTINUOUS
Status: ACTIVE | OUTPATIENT
Start: 2023-04-21 | End: 2023-04-22

## 2023-04-21 RX ORDER — NICOTINE POLACRILEX 4 MG
15-30 LOZENGE BUCCAL
Status: DISCONTINUED | OUTPATIENT
Start: 2023-04-21 | End: 2023-04-22

## 2023-04-21 RX ORDER — ONDANSETRON 2 MG/ML
4 INJECTION INTRAMUSCULAR; INTRAVENOUS EVERY 6 HOURS PRN
Status: DISCONTINUED | OUTPATIENT
Start: 2023-04-21 | End: 2023-05-02 | Stop reason: HOSPADM

## 2023-04-21 RX ORDER — SODIUM CHLORIDE AND POTASSIUM CHLORIDE 150; 450 MG/100ML; MG/100ML
INJECTION, SOLUTION INTRAVENOUS CONTINUOUS
Status: DISCONTINUED | OUTPATIENT
Start: 2023-04-21 | End: 2023-04-21

## 2023-04-21 RX ORDER — NITROGLYCERIN 0.4 MG/1
0.4 TABLET SUBLINGUAL EVERY 5 MIN PRN
Status: DISCONTINUED | OUTPATIENT
Start: 2023-04-21 | End: 2023-05-02 | Stop reason: HOSPADM

## 2023-04-21 RX ORDER — AMLODIPINE BESYLATE 5 MG/1
5 TABLET ORAL DAILY
Status: DISCONTINUED | OUTPATIENT
Start: 2023-04-21 | End: 2023-05-02 | Stop reason: HOSPADM

## 2023-04-21 RX ORDER — LIDOCAINE 40 MG/G
CREAM TOPICAL
Status: DISCONTINUED | OUTPATIENT
Start: 2023-04-21 | End: 2023-04-22

## 2023-04-21 RX ORDER — ACETAMINOPHEN 650 MG/1
650 SUPPOSITORY RECTAL EVERY 6 HOURS PRN
Status: DISCONTINUED | OUTPATIENT
Start: 2023-04-21 | End: 2023-05-02 | Stop reason: HOSPADM

## 2023-04-21 RX ORDER — CARVEDILOL 12.5 MG/1
12.5 TABLET ORAL 2 TIMES DAILY WITH MEALS
Status: DISCONTINUED | OUTPATIENT
Start: 2023-04-21 | End: 2023-05-02 | Stop reason: HOSPADM

## 2023-04-21 RX ADMIN — INSULIN ASPART 1 UNITS: 100 INJECTION, SOLUTION INTRAVENOUS; SUBCUTANEOUS at 19:05

## 2023-04-21 RX ADMIN — SODIUM CHLORIDE: 4.5 INJECTION, SOLUTION INTRAVENOUS at 15:02

## 2023-04-21 RX ADMIN — AMLODIPINE BESYLATE 5 MG: 5 TABLET ORAL at 09:49

## 2023-04-21 RX ADMIN — CARVEDILOL 12.5 MG: 12.5 TABLET, FILM COATED ORAL at 17:34

## 2023-04-21 RX ADMIN — POTASSIUM CHLORIDE, DEXTROSE MONOHYDRATE AND SODIUM CHLORIDE: 150; 5; 450 INJECTION, SOLUTION INTRAVENOUS at 00:53

## 2023-04-21 RX ADMIN — INSULIN GLARGINE 8 UNITS: 100 INJECTION, SOLUTION SUBCUTANEOUS at 17:36

## 2023-04-21 RX ADMIN — CARVEDILOL 12.5 MG: 12.5 TABLET, FILM COATED ORAL at 09:49

## 2023-04-21 RX ADMIN — SODIUM CHLORIDE 10 UNITS/HR: 9 INJECTION, SOLUTION INTRAVENOUS at 11:59

## 2023-04-21 ASSESSMENT — ACTIVITIES OF DAILY LIVING (ADL)
ADLS_ACUITY_SCORE: 25
ADLS_ACUITY_SCORE: 26
ADLS_ACUITY_SCORE: 35
ADLS_ACUITY_SCORE: 25
ADLS_ACUITY_SCORE: 24
ADLS_ACUITY_SCORE: 25
ADLS_ACUITY_SCORE: 24
ADLS_ACUITY_SCORE: 25
DEPENDENT_IADLS:: INDEPENDENT
ADLS_ACUITY_SCORE: 24
ADLS_ACUITY_SCORE: 25

## 2023-04-21 NOTE — PROGRESS NOTES
[facility] [place]  Hospitalist Progress Note    Date of Admission: 4/20/2023    Interval History   Patient appears to have baseline dysarthria.  From somewhat fragmented history reports that he has labile blood sugars over the last couple days.  He tried to call his endocrinologist and by description asked him if he had a subcu insulin  His sugars continued to be out of range and his family brought him to the ER.  He does not know how to put his pump back on.  He does not know how to operate his pump by description and adds that endocrinology will put his pump back in place.   He reports that the endocrine provider will come and put his pump back on today here in the hospital. Explained that patients need to manage their own insulin pumps.     Assessment & Plan   Tc Garcia is a 83 year old male with a history of diabetes mellitus type 2, paroxysmal atrial fibrillation, hypertension, hyperlipidemia, pulmonary hypertension, spontaneous intracranial hemorrhage, recurrent pleural effusion, chronic kidney disease stage III, and anemia of chronic disease who presented to the ER with elevated blood sugars.  In the ER he was found to have diabetic ketoacidosis.  He was given IV fluids, potassium, and IV insulin.  The hospitalist service was contacted to admit him for further evaluation management.     Diabetic ketoacidosis  Diabetes mellitus type 2  Hemoglobin A1c 8.0 on 4/20/2023.  He has an insulin pump and has been using it, denies any interruptions or errors.  No recent changes in his diet.  No obvious inciting factor identified thus far.  In the ER he had a glucose of 547, ketones of 3.8, bicarb 18, anion gap 23.    Admit to inpatient.    DKA protocol with IV insulin, IV fluids, serial labs.    Insulin pump is on hold for now.    Prior concerns in Epic in 2/2023 while at [facility] that his DM was brittle. In addition reported cognitive impairment. IDT did not recommend patient return to home and needed  "a high level of care. The family declined. >> \"dad will not agree to move to [place]\" \"he does what he wants to do\" [facility] report filed at the [facility]CU     Chest x-ray with no new changes.  UA pending.  No obvious infection identified so far.    Suspect that his ability to manage DM and his insulin pump may not be safe    Discussion with the patient that he will need to convert over to subcu insulin.    Do not have the ability to resume his insulin pump in the hospital and he is not familiar with it.    Reviewed records and there appears to be a lot of concern about the patient managing on prior encounters as noted.    Will change patient back over to subcu insulin and monitor closely.    He has lability on prior encounters    Ketones now negative with a normal bicarb and anion gap     Acute kidney injury  Chronic kidney disease, stage III    Baseline creatinine appears to be around 1.2.  Creatinine up to 1.72 in the ER.    Patient received IV fluids.  His creatinine is elevated.    Known history of retention again noted this admission    Creatinine 1.62 but high PVR with straight cath required    May be post obstructive rather than all dehydration     Lower extremity edema  Dyspnea on exertion  Has developed lower extremity edema and dyspnea on exertion over the last several days.  No prior history of congestive heart failure.    Echo with EF of 60 to 65%.  Right ventricle moderately dilated.  Moderately severe 3+ tricuspid regurgitation.  Moderate to severe pulmonary hypertension. (pulmonary hypertension on prior ECHO 2021)     Check venous Doppler     Urinary retention    Noted on prior hospital to stay.    Patient required intermittent straight cathing in the past.    Started on Flomax in January 2023    This admission noted again to have urinary retention requiring straight cath.  May be contributing to increase renal function.    Monitor renal function as post obstruction is removed      Paroxysmal atrial " fibrillation    Not on anticoagulation due to recent intracranial hemorrhage.    Continue PTA carvedilol.     History of spontaneous intracranial hemorrhage  Admitted to [facility] in January 2023 due to nontraumatic intraventricular intracerebral hemorrhage which was managed nonoperatively.    Noted.    Patient presented with dizziness, lightheadedness and a new intraventricular hemorrhage.    He was given Kcentra for anticoagulation reversal.    Had reported history of multiple falls prior to admission but no known head trauma that he reported.     Hypertension  Blood pressure fairly well controlled in the ER.    Continue PTA amlodipine and carvedilol.    Hold PTA irbesartan in setting of acute kidney injury    Monitor BP closely.    Anticipate renal function may improve with straight caths..     Anemia of chronic disease  Hemoglobin 11.8 on 4/20/2023.    Recheck in AM.     Disposition    Case management involved.    In the past felt unsafe for patient to be at home with even a vulnerable adult report filed.    Interdisciplinary team's at the TCU did not feel he should go home but the family was okay with discharge as they felt the patient requested this    Again concerns about home safety    Patient would appear unable to manage his diabetes and has cognitive impairment likely.    Will get an OT assessment with a hans score.    PT    CM/SW involvement         Diet: Moderate Consistent Carb (60 g CHO per Meal) Diet  Pruett Catheter: Not present  DVT Prophylaxis: compression pneumoboots   Code Status: No CPR- Do NOT Intubate       Expected Discharge Date: 04/24/2023,  3:00 PM      Discharge Comments: DKA, MANPREET  needs [facility]       SMILEY LUIS MD,   Hospitalist Service  [facility]  ______________________________________________________________________    -Data reviewed today: I reviewed all new labs and imaging results over the last 24 hours. I personally reviewed no images or EKG's  today.    Physical Exam   Temp: 98.3  F (36.8  C) Temp src: Oral BP: 139/78 Pulse: 64   Resp: 14 SpO2: 94 % O2 Device: None (Room air) Oxygen Delivery: 2 LPM  Vitals:    04/20/23 1801   Weight: 72.6 kg (160 lb)   Constitutional: Patient is in no acute distress.  Respiratory: Breath sounds CTA. No increased work of breathing.  Cardiovascular: RRR, no rub or murmur. No peripheral edema.  GI: Soft, non-tender, non-distended.  Skin: Warm, dry, no rashes or lesions.  Other: 1-2 + edema     Medications     0.45% sodium chloride + KCl 20 mEq/L       dextrose 5% and 0.45% NaCl + KCl 20 mEq/L 50 mL/hr at 04/21/23 1230     insulin 6 Units/hr (04/21/23 1228)     - MEDICATION INSTRUCTIONS -         amLODIPine  5 mg Oral Daily     carvedilol  12.5 mg Oral BID w/meals     insulin aspart  1-3 Units Subcutaneous TID AC     insulin aspart  1-3 Units Subcutaneous At Bedtime     [START ON 4/22/2023] insulin glargine  10 Units Subcutaneous QAM AC     sodium chloride (PF)  3 mL Intracatheter Q8H       Data   Recent Labs   Lab 04/21/23  1208 04/21/23  1122 04/21/23  1048 04/21/23  0603 04/21/23  0538 04/21/23  0528 04/21/23  0407 04/20/23  2032 04/20/23  1808 04/20/23  1804   WBC  --   --   --   --   --   --   --   --   --  11.6*   HGB  --   --   --   --   --   --   --   --   --  11.8*   MCV  --   --   --   --   --   --   --   --   --  88   PLT  --   --   --   --   --   --   --   --   --  238   NA  --   --  138  --  140  --  140   < > 137  --    POTASSIUM  --   --  4.1  --  4.0  --  3.9   < > 4.5  --    CHLORIDE  --   --  103  --  105  --  106   < > 96*  --    CO2  --   --  23  --  23  --  23   < > 18*  --    BUN  --   --  28.4*  --  27.8*  --  27.8*   < > 30.5*  --    CR  --   --  1.63*  --  1.55*  --  1.55*   < > 1.72*  --    ANIONGAP  --   --  12  --  12  --  11   < > 23*  --    LLOYD  --   --  8.9  --  8.6*  --  8.5*   < > 9.3  --    * 216* 263*   < > 138*   < > 89   < > 547*  --    ALBUMIN  --   --   --   --   --   --   --   --   4.3  --    PROTTOTAL  --   --   --   --   --   --   --   --  6.1*  --    BILITOTAL  --   --   --   --   --   --   --   --  1.1  --    ALKPHOS  --   --   --   --   --   --   --   --  92  --    ALT  --   --   --   --   --   --   --   --  6*  --    AST  --   --   --   --   --   --   --   --  16  --     < > = values in this interval not displayed.       Imaging:  Recent Results (from the past 24 hour(s))   XR Chest 2 Views    Narrative    EXAM: XR CHEST 2 VIEWS  LOCATION: [facility]  DATE/TIME: 4/20/2023 8:29 PM CDT    INDICATION: hyperglycemia, looking for infection source  COMPARISON: 01/29/2023      Impression    IMPRESSION: Heart size borderline enlarged. Pulmonary vascularity normal. Small left pleural effusion and left basilar atelectasis with minimal change. Linear atelectasis mid left lung. The right lung is clear. Old mid right rib fracture. Upper lumbar   vertebroplasty.

## 2023-04-21 NOTE — PROVIDER NOTIFICATION
MD Notification    Notified Person: MD    Notified Person Name: Ana Jon     Notification Date/Time: 4/21 @ 1141    Notification Interaction: Vocera    Purpose of Notification: Pt just had 100mL output, PVR was >518. Would you be able to add bladder scan/straight cath orders please? Thanks    Orders Received: Orders added    Comments: Straight cath completed with 600mL output.

## 2023-04-21 NOTE — CONSULTS
Care Management Initial Consult    General Information  Assessment completed with: Children, Dtr Brittani  Type of CM/SW Visit: Initial Assessment    Primary Care Provider verified and updated as needed:     Readmission within the last 30 days: no previous admission in last 30 days      Reason for Consult: discharge planning  Advance Care Planning:            Communication Assessment  Patient's communication style: spoken language (English or Bilingual)    Hearing Difficulty or Deaf: yes   Wear Glasses or Blind: yes    Cognitive  Cognitive/Neuro/Behavioral: WDL                      Living Environment:   People in home: spouse     Current living Arrangements: house      Able to return to prior arrangements: no       Family/Social Support:  Care provided by: self  Provides care for: spouse  Marital Status:   Children          Description of Support System: Supportive         Current Resources:   Patient receiving home care services:       Community Resources:    Equipment currently used at home: shower chair, walker, rolling  Supplies currently used at home:      Employment/Financial:  Employment Status: retired        Financial Concerns:   None          Lifestyle & Psychosocial Needs:  Social Determinants of Health     Tobacco Use: Medium Risk (4/20/2023)    Patient History      Smoking Tobacco Use: Former      Smokeless Tobacco Use: Never      Passive Exposure: Not on file   Alcohol Use: Not At Risk (10/27/2020)    AUDIT-C      Frequency of Alcohol Consumption: Never      Average Number of Drinks: Not on file      Frequency of Binge Drinking: Not on file   Financial Resource Strain: Not on file   Food Insecurity: Not on file   Transportation Needs: Not on file   Physical Activity: Not on file   Stress: Not on file   Social Connections: Not on file   Intimate Partner Violence: Not on file   Depression: Not at risk (2/27/2023)    PHQ-2      PHQ-2 Score: 0   Housing Stability: Not on file       Functional  Status:  Prior to admission patient needed assistance:   Dependent ADLs:: Independent  Dependent IADLs:: Independent  Assesssment of Functional Status: Not at baseline with ADL Functioning    Mental Health Status:  Mental Health Status: No Current Concerns       Chemical Dependency Status:  Chemical Dependency Status: No Current Concerns             Values/Beliefs:  Spiritual, Cultural Beliefs, Judaism Practices, Values that affect care: no               Additional Information:   Spoke  With patients daughter Brittani [phone]  Regarding plan of care and discharge plans.  Brittani stating patient is independent at baseline,manges his medications,continues to drive ,get groceries. Patient lives in a house with bedroom upstairs. They have a stair lift that they use to manage stairs.  Patient was at [facility] recently. At discharge from TCU staff felt patient was not safe to return home,but patient was adamant and failed to follow recommendations . A [facility]  Report was filed by them.  Per MD report.  Discussed with daughter that there are concerns about patient being competent enough to manage his insulin pump. Daughter stated she was surprised that it did not cause it was told by the Endocrinologist that insulin pump  May regulate his bloodsugars.  Daughter stating patient is not compliant with his diet and could be the cause of high sugars. Asked dtr if patients wife would be able to assist with administering insulin. Daughter her mother has mild dementia and should not be trusted with managing patient's medications as she has dementia. None of patients daughters will be able to assist at this time. Daughter stating family has been looking into AL places however patient and spouse is not ready yet.   Discussed that we would have therapy work with patient and OT to do a SLUMS test to determine medication safety and cognitive status.   Disposition is determined on therapy recommendation.    Senait Blackwell RN    [phone]

## 2023-04-21 NOTE — PLAN OF CARE
A&O x 4.  VSS on RA. Tele: A. Fib, CVR. Up Ax1, GB. ModCarb diet, tolerating well. 2xPIV, insulin infusing, BG stabilizing overnight. Pt denies pain. Lungs diminished. Rash/scabs on BLE. Voiding in urinal. -BM, +BS. Continue plan of care.

## 2023-04-21 NOTE — PROVIDER NOTIFICATION
MD Notification    Notified Person: MD    Notified Person Name: Ana Jon    Notification Date/Time: 4/21 @ 0812    Notification Interaction: Kenan    Purpose of Notification: It was reported to me from nights that pt is needing a UA collected, however there is no order? Did you still want UA? If so, would you be able to place order? Thanks!    Orders Received: Do not need to collect UA  Comments:

## 2023-04-21 NOTE — ED PROVIDER NOTES
History     Chief Complaint:  Hyperglycemia       The history is provided by the patient.      Tc Garcia is a 83 year old male with history of type 2 diabetes mellitus, insulin dependent, presents with hyperglycemia. The patient reports elevated blood sugars over 500 last night before going to bed. He used insulin, but woke up overnight and it was still high. He called his doctor and was advised to come to the emergency department. He reports that yesterday he felt much weaker, but denies other symptoms. No recent fevers, but he notes decreased appetite. The patient reports a spinal injection two days ago, but no steroids were involved. He denies any change in his insulin routine.     Independent Historian:   None - Patient Only    Review of External Notes: None    ROS:  Review of Systems   Constitutional: Positive for appetite change. Negative for fever.   Neurological: Positive for weakness.   All other systems reviewed and are negative.      Allergies:  No Known Drug Allergies     Medications:    Amlodipine   Carvedilol  Glucagon  Insulin  Lovastatin  Tamsulosin    Past Medical History:    Anemia  Type 2 diabetes mellitus  CKD stage 3  Hyperlipidemia  Hypertension   Paroxysmal atrial fibrillation  Colonic polyps  Pleural effusion  Pulmonary hypertension  DKA  Alcohol dependence  Chronic diastolic heart failure  Vitamin D deficiency  Secondary hyperparathyroidism  Diabetic neuropathy  Nontraumatic intraventricular intracerebral hemorrhage Nontraumatic intraventricular intracerebral hemorrhage    Past Surgical History:    Anesthesia cardioversion  Right heart cath  Tonsillectomy & adenoidectomy  Chest tube placement and removal  Laparoscopic cholecystectomy  Herniorrhaphy inguinal bilateral     Family History:    Diabetes mellitus  Heart disease     Social History:  The patient presents alone.   PCP: Jessika Cordoba     Physical Exam     Patient Vitals for the past 24 hrs:   BP Temp Temp src Pulse  "Resp SpO2 Height Weight   04/20/23 1927 (!) 153/85 -- -- -- 20 94 % -- --   04/20/23 1922 (!) 153/85 98.2  F (36.8  C) Oral 76 20 96 % -- --   04/20/23 1801 136/59 98  F (36.7  C) Oral 99 16 99 % 1.753 m (5' 9\") 72.6 kg (160 lb)        Physical Exam  General/Appearance: appears stated age, well-groomed, appears comfortable  Eyes: EOMI, no scleral injection, no icterus  ENT: dry oral mucosa  Neck: supple, nl ROM, no stiffness  Cardiovascular: tachy but regular, nl S1S2, no m/r/g, 2+ pulses in all 4 extremities, cap refill <2sec  Respiratory: CTAB, good air movement throughout, no wheezes/rhonchi/rales, no increased WOB, no retractions  GI: abd soft, non-distended, nttp,  no HSM, no rebound, no guarding, nl BS  MSK: DOWNS, good tone, no bony abnormality  Skin: warm and well-perfused, no rash, no edema, no ecchymosis, nl turgor  Neuro: GCS 15, alert and oriented, no gross focal neuro deficits  Psych: interacts appropriately  Heme: no petechia, no purpura, no active bleeding      Emergency Department Course     Imaging:  XR Chest 2 Views   Final Result   IMPRESSION: Heart size borderline enlarged. Pulmonary vascularity normal. Small left pleural effusion and left basilar atelectasis with minimal change. Linear atelectasis mid left lung. The right lung is clear. Old mid right rib fracture. Upper lumbar    vertebroplasty.      Report per radiology    Laboratory:  Labs Ordered and Resulted from Time of ED Arrival to Time of ED Departure   CBC WITH PLATELETS AND DIFFERENTIAL - Abnormal       Result Value    WBC Count 11.6 (*)     RBC Count 4.25 (*)     Hemoglobin 11.8 (*)     Hematocrit 37.2 (*)     MCV 88      MCH 27.8      MCHC 31.7      RDW 14.6      Platelet Count 238      % Neutrophils 82      % Lymphocytes 9      % Monocytes 7      % Eosinophils 0      % Basophils 1      % Immature Granulocytes 1      NRBCs per 100 WBC 0      Absolute Neutrophils 9.6 (*)     Absolute Lymphocytes 1.0      Absolute Monocytes 0.8      " Absolute Eosinophils 0.0      Absolute Basophils 0.1      Absolute Immature Granulocytes 0.1      Absolute NRBCs 0.0     COMPREHENSIVE METABOLIC PANEL - Abnormal    Sodium 137      Potassium 4.5      Chloride 96 (*)     Carbon Dioxide (CO2) 18 (*)     Anion Gap 23 (*)     Urea Nitrogen 30.5 (*)     Creatinine 1.72 (*)     Calcium 9.3      Glucose 547 (*)     Alkaline Phosphatase 92      AST 16      ALT 6 (*)     Protein Total 6.1 (*)     Albumin 4.3      Bilirubin Total 1.1      GFR Estimate 39 (*)    LACTIC ACID WHOLE BLOOD - Abnormal    Lactic Acid 3.4 (*)    KETONE BETA-HYDROXYBUTYRATE QUANTITATIVE, RAPID - Abnormal    Ketone (Beta-Hydroxybutyrate) Quantitative 3.80 (*)    BLOOD GAS VENOUS - Abnormal    pH Venous 7.31 (*)     pCO2 Venous 40      pO2 Venous 43      Bicarbonate Venous 20 (*)     Base Excess/Deficit (+/-) -6.0      FIO2 99     HEMOGLOBIN A1C - Abnormal    Hemoglobin A1C 8.0 (*)    OSMOLALITY - Abnormal    Osmolality Blood 315 (*)    LACTIC ACID WHOLE BLOOD - Abnormal    Lactic Acid 2.9 (*)    GLUCOSE BY METER - Abnormal    GLUCOSE BY METER POCT 391 (*)    MAGNESIUM - Normal    Magnesium 2.2     PHOSPHORUS - Normal    Phosphorus 3.1     GLUCOSE MONITOR NURSING POCT   GLUCOSE MONITOR NURSING POCT   ROUTINE UA WITH MICROSCOPIC REFLEX TO CULTURE        Emergency Department Course & Assessments:     Interventions:  Medications   dextrose 5% and 0.45% NaCl infusion (has no administration in time range)   dextrose 50 % injection 25-50 mL (has no administration in time range)   0.9% sodium chloride BOLUS (0 mLs Intravenous Stopped 4/20/23 2045)     Followed by   0.45% sodium chloride infusion (1,000 mLs Intravenous $New Bag 4/20/23 2034)   insulin 1 unit/1 mL in NS (NovoLIN, HumuLIN Regular) drip -ED DKA algorithm ( Intravenous $New Bag 4/20/23 2041)   piperacillin-tazobactam (ZOSYN) 4.5 g vial to attach to  mL bag (4.5 g Intravenous $New Bag 4/20/23 2126)   potassium chloride 10 mEq in 100 mL sterile  water infusion (0 mEq Intravenous Stopped 4/20/23 2045)   0.9% sodium chloride BOLUS (0 mLs Intravenous Stopped 4/20/23 2047)        Assessments:  1802 I obtained history and examined the patient as noted above.   1920 I rechecked and updated the patient. We discussed admission.     Independent Interpretation (X-rays, CTs, rhythm strip):  None    Consultations/Discussion of Management or Tests:  2118 I spoke with Dr. Ayers of the hospitalist team regarding the patient, who accepted the patient for admission.     Social Determinants of Health affecting care:   None    Disposition:  The patient was admitted to the hospital under the care of Dr. Ayers.     Impression & Plan    CMS Diagnoses: The Lactic acid level is elevated due to DKA, at this time there is no sign of severe sepsis or septic shock.    Medical Decision Making:  This patient is a pleasant 83-year-old male, type II diabetic who is insulin-dependent, who presents in DKA.  There is no clear exacerbating reason for why he is in DKA.  I am not able to identify an infection however with his elevated lactic acid which did not totally resolve after 2 L of fluid I am starting him on Zosyn.  Otherwise he is getting K bumps and is started on an insulin drip.  He will be admitted to the hospitalist service for further management.    Critical Care Time: 45 min      Diagnosis:    ICD-10-CM    1. Diabetic ketoacidosis without coma associated with type 2 diabetes mellitus (H)  E11.10            Scribe Disclosure:  I, Sejal Lewis, am serving as a scribe at 7:10 PM on 4/20/2023 to document services personally performed by Cathi Frost MD based on my observations and the provider's statements to me.     4/20/2023   Cathi Frost MD Mahoney, Katherine Collins, MD  04/20/23 2134

## 2023-04-21 NOTE — ED NOTES
[facility]  ED Nurse Handoff Report    ED Chief complaint: Hyperglycemia      ED Diagnosis:   Final diagnoses:   Diabetic ketoacidosis without coma associated with type 2 diabetes mellitus (H)       Code Status: MD need to assess     Allergies:   Allergies   Allergen Reactions     No Known Drug Allergies        Patient Story: Patient  Noticed high blood sugar last night. His doctor advised to go to hospital.   Focused Assessment:  Monitor Blood Sugar    Treatments and/or interventions provided: 2 L IVF given, Patient is on Insulin drip, Potasium chloride IV given, Lactic checked, CXR.   Patient's response to treatments and/or interventions: Blood glucose trending down     To be done/followed up on inpatient unit:  UA needs to be collected, Continue plan of care for BG    Does this patient have any cognitive concerns?: none    Activity level - Baseline/Home:  Independent walker  Activity Level - Current:   SBA walker    Patient's Preferred language: English   Needed?: No    Isolation: None  Infection: Not Applicable  Patient tested for COVID 19 prior to admission: NO  Bariatric?: No  XR Chest 2 Views   Final Result   IMPRESSION: Heart size borderline enlarged. Pulmonary vascularity normal. Small left pleural effusion and left basilar atelectasis with minimal change. Linear atelectasis mid left lung. The right lung is clear. Old mid right rib fracture. Upper lumbar    vertebroplasty.        Labs Ordered and Resulted from Time of ED Arrival to Time of ED Departure   CBC WITH PLATELETS AND DIFFERENTIAL - Abnormal       Result Value    WBC Count 11.6 (*)     RBC Count 4.25 (*)     Hemoglobin 11.8 (*)     Hematocrit 37.2 (*)     MCV 88      MCH 27.8      MCHC 31.7      RDW 14.6      Platelet Count 238      % Neutrophils 82      % Lymphocytes 9      % Monocytes 7      % Eosinophils 0      % Basophils 1      % Immature Granulocytes 1      NRBCs per 100 WBC 0      Absolute Neutrophils 9.6 (*)      Absolute Lymphocytes 1.0      Absolute Monocytes 0.8      Absolute Eosinophils 0.0      Absolute Basophils 0.1      Absolute Immature Granulocytes 0.1      Absolute NRBCs 0.0     COMPREHENSIVE METABOLIC PANEL - Abnormal    Sodium 137      Potassium 4.5      Chloride 96 (*)     Carbon Dioxide (CO2) 18 (*)     Anion Gap 23 (*)     Urea Nitrogen 30.5 (*)     Creatinine 1.72 (*)     Calcium 9.3      Glucose 547 (*)     Alkaline Phosphatase 92      AST 16      ALT 6 (*)     Protein Total 6.1 (*)     Albumin 4.3      Bilirubin Total 1.1      GFR Estimate 39 (*)    LACTIC ACID WHOLE BLOOD - Abnormal    Lactic Acid 3.4 (*)    KETONE BETA-HYDROXYBUTYRATE QUANTITATIVE, RAPID - Abnormal    Ketone (Beta-Hydroxybutyrate) Quantitative 3.80 (*)    BLOOD GAS VENOUS - Abnormal    pH Venous 7.31 (*)     pCO2 Venous 40      pO2 Venous 43      Bicarbonate Venous 20 (*)     Base Excess/Deficit (+/-) -6.0      FIO2 99     HEMOGLOBIN A1C - Abnormal    Hemoglobin A1C 8.0 (*)    OSMOLALITY - Abnormal    Osmolality Blood 315 (*)    LACTIC ACID WHOLE BLOOD - Abnormal    Lactic Acid 2.9 (*)    GLUCOSE BY METER - Abnormal    GLUCOSE BY METER POCT 391 (*)    GLUCOSE BY METER - Abnormal    GLUCOSE BY METER POCT 366 (*)    GLUCOSE BY METER - Abnormal    GLUCOSE BY METER POCT 293 (*)    MAGNESIUM - Normal    Magnesium 2.2     PHOSPHORUS - Normal    Phosphorus 3.1     GLUCOSE MONITOR NURSING POCT   GLUCOSE MONITOR NURSING POCT   ROUTINE UA WITH MICROSCOPIC REFLEX TO CULTURE       Vital Signs:   Vitals:    04/20/23 1922 04/20/23 1927 04/20/23 2100 04/20/23 2300   BP: (!) 153/85 (!) 153/85  (!) 129/90   BP Location: Right arm Right arm  Right arm   Pulse: 76      Resp: 20 20 20    Temp: 98.2  F (36.8  C)      TempSrc: Oral      SpO2: 96% 94% 96%    Weight:       Height:           Cardiac Rhythm: A-fib    Was the PSS-3 completed:   Yes  What interventions are required if any?               Family Comments:   OBS brochure/video discussed/provided to  patient/family: Yes              Name of person given brochure if not patient:               Relationship to patient:     For the majority of the shift this patient's behavior was Green.   Behavioral interventions performed were None    ED NURSE PHONE NUMBER: [phone]

## 2023-04-21 NOTE — PROVIDER NOTIFICATION
MD Notification    Notified Person: MD    Notified Person Name: Ana Jon    Notification Date/Time:4/21 @ 0958    Notification Interaction: Vocera    Purpose of Notification: pt has a diet order with no novolog orders or any other insulin orders. Did you want insulin orders with meals along with the pump?     Orders Received: Orders added    Comments: would like patient to hold off eating until we can control BG better, and once down closer to 1 unit/hr would add back Lantus

## 2023-04-21 NOTE — UTILIZATION REVIEW
"  Admission Status; Secondary Review Determination         Under the authority of the Utilization Management Committee, the utilization review process indicated a secondary review on the above patient.  The review outcome is based on review of the medical records, discussions with staff, and applying clinical experience noted on the date of the review.        (xxx)      Inpatient Status Appropriate - This patient's medical care is consistent with medical management for inpatient care and reasonable inpatient medical practice.      () Observation Status Appropriate - This patient does not meet hospital inpatient criteria and is placed in observation status. If this patient's primary payer is Medicare and was admitted as an inpatient, Condition Code 44 should be used and patient status changed to \"observation\".   () Admission Status NOT Appropriate - This patient's medical care is not consistent with medical management for Inpatient or Observation Status.          RATIONALE FOR DETERMINATION     Tc Garcia is an 83 year old male with a history of diabetes mellitus type 2, paroxysmal atrial fibrillation, hypertension, hyperlipidemia, pulmonary hypertension, spontaneous intracranial hemorrhage, recurrent pleural effusion, chronic kidney disease stage III, and anemia of chronic disease who was admitted on 4/20/2023 with diabetic ketoacidosis.  He was given IV fluids, potassium, and IV insulin.  He is admitted for close clinical monitoring, IVF, IV insulin drip, electrolyte monitoring and replacement, and further evaluation.  IP status is appropriate.      The severity of illness, intensity of service provided, expected LOS and risk for adverse outcome make the care complex, high risk and appropriate for hospital admission.        The information on this document is developed by the utilization review team in order for the business office to ensure compliance.  This only denotes the appropriateness of proper admission " status and does not reflect the quality of care rendered.         The definitions of Inpatient Status and Observation Status used in making the determination above are those provided in the CMS Coverage Manual, Chapter 1 and Chapter 6, section 70.4.      Sincerely,   Pattie Jewell MD  Physician Advisor   Utilization Review/ Case Management  [facility].

## 2023-04-21 NOTE — H&P
[facility]    History and Physical - Hospitalist Service       Date of Admission:  4/20/2023    Assessment & Plan      Tc Garcia is a 83 year old male with a history of diabetes mellitus type 2, paroxysmal atrial fibrillation, hypertension, hyperlipidemia, pulmonary hypertension, spontaneous intracranial hemorrhage, recurrent pleural effusion, chronic kidney disease stage III, and anemia of chronic disease who presented to the ER with elevated blood sugars.  In the ER he was found to have diabetic ketoacidosis.  He was given IV fluids, potassium, and IV insulin.  The hospitalist service was contacted to admit him for further evaluation management.    Diabetic ketoacidosis  Diabetes mellitus type 2  Hemoglobin A1c 8.0 on 4/20/2023.  He has an insulin pump and has been using it, denies any interruptions or errors.  No recent changes in his diet.  No obvious inciting factor identified thus far.  In the ER he had a glucose of 547, ketones of 3.8, bicarb 18, anion gap 23.    Admit to inpatient.    DKA protocol with IV insulin, IV fluids, serial labs.    Insulin pump is on hold for now.    Diabetic diet.    Chest x-ray with no new changes.  UA pending.  No obvious infection identified so far.    Acute kidney injury  Chronic kidney disease, stage III    Baseline creatinine appears to be around 1.2.  Creatinine up to 1.72 in the ER.    Receiving IV fluids for DKA as noted above.  Monitor volume status closely.    Recheck labs in AM.    Lower extremity edema  Dyspnea on exertion  Has developed lower extremity edema and dyspnea on exertion over the last several days.  No prior history of congestive heart failure.    TTE in a.m.  Consider further work-up/evaluation pending results.    Monitor intake and output and daily weights.    Paroxysmal atrial fibrillation    Not on anticoagulation due to recent intracranial hemorrhage.    Continue PTA carvedilol.    History of spontaneous intracranial  hemorrhage  Admitted to [facility] in January 2023 due to nontraumatic intraventricular intracerebral hemorrhage which was managed nonoperatively.    Noted.    Hypertension  Blood pressure fairly well controlled in the ER.    Continue PTA amlodipine and carvedilol.    Hold PTA irbesartan in setting of acute kidney injury.    Anemia of chronic disease  Hemoglobin 11.8 on 4/20/2023.    Recheck in AM.       Diet:   diabetic diet  DVT Prophylaxis: Pneumatic Compression Devices  Pruett Catheter: Not present  Lines: None     Cardiac Monitoring: None  Code Status:   DNR/DNI    Clinically Significant Risk Factors Present on Admission             # Anion Gap Metabolic Acidosis: Highest Anion Gap = 23 mmol/L in last 2 days, will monitor and treat as appropriate     # Acute Kidney Injury, unspecified: based on a >150% or 0.3 mg/dL increase in last creatinine compared to past 90 day average, will monitor renal function  # Hypertension: home medication list includes antihypertensive(s)     # DMII: A1C = 8.0 % (Ref range: <5.7 %) within past 6 months           Disposition Plan      Expected Discharge Date: 04/22/2023                  Jhon Ayers MD  Hospitalist Service  [facility]  Securely message with [employer] (more info)  Text page via [facility] Paging/Directory     ______________________________________________________________________    Chief Complaint   Elevated blood sugars    History is obtained from the patient    History of Present Illness   Tc Garcia is a 83 year old male with a history of diabetes mellitus type 2, paroxysmal atrial fibrillation, hypertension, hyperlipidemia, pulmonary hypertension, recurrent pleural effusion, chronic kidney disease stage III, and anemia of chronic disease who presented to the ER with elevated blood sugars.  He has not been feeling well over the last couple days.  Has mainly just felt weak and a little fatigued.  Some shortness of breath with  exertion, but not at rest.  Last night he noticed his blood sugar was over 400.  He has an insulin pump which seem to be working appropriately, however he gave himself an extra shot of Humalog.  This morning his blood sugar was still quite elevated and later in the day it was up over 500.  He called his primary care provider and was advised to come into the emergency room for further treatment.    In the ER he was evaluated by Dr. Frost.  Vitals fairly unremarkable as documented in epic.  Labs were consistent with DKA and acute kidney injury.  Chest x-ray showed a chronic small left pleural effusion, no new acute changes.  He was given fluids, potassium, and IV insulin.  The hospitalist service was contacted to admit him for further evaluation management.    He has a history of diabetes mellitus type 2.  He has had an insulin pump for approximately 10 years.  The site for the insulin pump was replaced about 3 days ago.  He feels that it has been functioning appropriately and has not had any errors or interruptions.  No change in his diet recently.  He has been little weak, fatigued, dyspneic with exertion over the last couple days.  He is also noticed some lower extremity edema over the same timeframe.  No fevers, runny nose, sore throat, cough, nausea, abdominal pain, diarrhea, urinary symptoms.  He denies any recent changes to his medications.  He has had some back issues and recently had a vertebroplasty on April 18, 2023.  He denies receiving any steroids, however I do not have the records from the procedure and do not know if he received any steroid with anesthesia.    Past Medical History    Past Medical History:   Diagnosis Date     Anemia      Anemia of chronic disease 5/14/2020     Back pain      CKD (chronic kidney disease) stage 3, GFR 30-59 ml/min (H)      CRF (chronic renal failure), stage 3  5/14/2020     Fall 11/2019    suffered multiple left rib fractures, C3 and T2 fractures     Mixed  hyperlipidemia 7/7/2004     Paroxysmal atrial fibrillation (H)      Personal history of colonic polyps 1972    gets colonoscopy every five years, due in 2006     Pleural effusion on left 11/2019    after multiple rib fractures     Pulmonary hypertension (H) 5/10/2020    Added automatically from request for surgery [id number]     Recurrent Left Pleural effusion -- S/P Pleurex Cath 5/12/20 12/30/2019     Rosacea 1989     Type II or unspecified type diabetes mellitus without mention of complication, not stated as uncontrolled 1999     Unspecified essential hypertension 1994     Past Surgical History   Past Surgical History:   Procedure Laterality Date     ANESTHESIA CARDIOVERSION N/A 2/3/2020    Procedure: ANESTHESIA, FOR CARDIOVERSION;  Surgeon: GENERIC ANESTHESIA PROVIDER;  Location:  OR      RIGHT HEART CATH MEASUREMENTS RECORDED N/A 5/13/2020    Procedure: Right Heart Cath;  Surgeon: Senthil Silva MD;  Location:  HEART CARDIAC CATH LAB     HC REMOVE TONSILS/ADENOIDS,<13 Y/O      T & A <11y.o.     IR CHEST TUBE DRAIN TUNNELED LEFT  5/12/2020     IR CHEST TUBE PLACEMENT NON-TUNNELLED LEFT  7/11/2020     IR CHEST TUBE REMOVAL NON TUNNELED LEFT  7/17/2020     IR CHEST TUBE REMOVAL TUNNELED LEFT  7/11/2020     LAPAROSCOPIC CHOLECYSTECTOMY N/A 11/22/2020    Procedure: CHOLECYSTECTOMY, LAPAROSCOPIC;  Surgeon: Annette Lambert MD;  Location:  OR     LAPAROSCOPIC HERNIORRHAPHY INGUINAL BILATERAL Bilateral 10/27/2020    Procedure: LAPAROSCOPIC BILATERAL INGUINAL HERNIA REPAIR WITH MESH;  Surgeon: Brian Garsia MD;  Location:  OR     Prior to Admission Medications   Prior to Admission Medications   Prescriptions Last Dose Informant Patient Reported? Taking?   HUMALOG 100 UNIT/ML injection   Yes No   INSULIN PUMP - OUTPATIENT   Yes Yes   Sig: [employer] device with no CGM and site change every 3 days   Sensitivity factor (midnight to midnight): 55  Bolus (units:gram): 7655-1881 = 1:9, 9036-4852 = 1:7,  8852-0167 = 1:7, 6808-5445 = 1:9, 3446-5061 = 1:13  Basal (units/hour): 6726-3425 = 0.45, 6294-0178 = 0.5, 1737-9005 = 0.625, 3358-5536 = 0.6, 5079-8271 = 0.45   acetaminophen (TYLENOL) 325 MG tablet   Yes No   Sig: Take 650 mg by mouth every 4 hours as needed for mild pain or fever   amLODIPine (NORVASC) 5 MG tablet 2023  No Yes   Sig: Take 1 tablet (5 mg) by mouth 2 times daily   Patient taking differently: Take 5 mg by mouth daily   carvedilol (COREG) 12.5 MG tablet 2023  No Yes   Sig: Take 1 tablet (12.5 mg) by mouth 2 times daily (with meals)   glucagon 1 MG kit Unknown  Yes Yes   Si mg as needed for low blood sugar   insulin glargine (LANTUS PEN) 100 UNIT/ML pen Not Taking  No No   Sig: Inject 10 Units Subcutaneous At Bedtime   Patient not taking: Reported on 2023   irbesartan (AVAPRO) 150 MG tablet 2023  No Yes   Sig: Take 1 tablet (150 mg) by mouth At Bedtime   traMADol (ULTRAM) 50 MG tablet   Yes No   Sig: TAKE 1 1 BY MOUTH AS NEEDED EVERY 8 HOURS FOR 4 DAYS      Facility-Administered Medications: None        Review of Systems    The 10 point Review of Systems is negative other than noted in the HPI or here.    Social History   I have reviewed this patient's social history and updated it with pertinent information if needed.  Social History     Tobacco Use     Smoking status: Former     Packs/day: 0.20     Years: 40.00     Pack years: 8.00     Types: Cigarettes     Start date:      Quit date: 2008     Years since quitting: 15.3     Smokeless tobacco: Never   Vaping Use     Vaping status: Never Used   Substance Use Topics     Alcohol use: Not Currently     Alcohol/week: 35.0 standard drinks of alcohol     Drug use: Not Currently     Allergies   Allergies   Allergen Reactions     No Known Drug Allergies         Physical Exam   Vital Signs: Temp: 98.2  F (36.8  C) Temp src: Oral BP: (!) 153/85 Pulse: 76   Resp: 20 SpO2: 94 % O2 Device: Nasal cannula Oxygen Delivery: 2  LPM  Weight: 160 lbs 0 oz    Constitutional: awake, alert, cooperative, no apparent distress, laying in the ER bed  Respiratory: no increased work of breathing, clear to auscultation bilaterally, no crackles or wheezing  Cardiovascular: regular rate and rhythm, normal S1 and S2, no murmur noted  GI: normal bowel sounds, soft, non-distended, non-tender  Skin: warm, dry  Musculoskeletal: 2+ lower extremity pitting edema present  Neurologic: awake, alert, answers questions appropriately, moves all extremities    Medical Decision Making       85 MINUTES SPENT BY ME on the date of service doing chart review, history, exam, documentation & further activities per the note.      Data     I have personally reviewed the following data over the past 24 hrs:    11.6 (H)  \   11.8 (L)   / 238     137 96 (L) 30.5 (H) /  293 (H)   4.5 18 (L) 1.72 (H) \       ALT: 6 (L) AST: 16 AP: 92 TBILI: 1.1   ALB: 4.3 TOT PROTEIN: 6.1 (L) LIPASE: N/A       TSH: N/A T4: N/A A1C: 8.0 (H)       Procal: N/A CRP: N/A Lactic Acid: 2.9 (H)         Imaging results reviewed over the past 24 hrs:   Recent Results (from the past 24 hour(s))   XR Chest 2 Views    Narrative    EXAM: XR CHEST 2 VIEWS  LOCATION: [facility]  DATE/TIME: 4/20/2023 8:29 PM CDT    INDICATION: hyperglycemia, looking for infection source  COMPARISON: 01/29/2023      Impression    IMPRESSION: Heart size borderline enlarged. Pulmonary vascularity normal. Small left pleural effusion and left basilar atelectasis with minimal change. Linear atelectasis mid left lung. The right lung is clear. Old mid right rib fracture. Upper lumbar   vertebroplasty.

## 2023-04-22 ENCOUNTER — APPOINTMENT (OUTPATIENT)
Dept: OCCUPATIONAL THERAPY | Facility: CLINIC | Age: 83
DRG: 638 | End: 2023-04-22
Attending: INTERNAL MEDICINE
Payer: COMMERCIAL

## 2023-04-22 ENCOUNTER — APPOINTMENT (OUTPATIENT)
Dept: ULTRASOUND IMAGING | Facility: CLINIC | Age: 83
DRG: 638 | End: 2023-04-22
Attending: INTERNAL MEDICINE
Payer: COMMERCIAL

## 2023-04-22 LAB
ANION GAP SERPL CALCULATED.3IONS-SCNC: 11 MMOL/L (ref 7–15)
ANION GAP SERPL CALCULATED.3IONS-SCNC: 13 MMOL/L (ref 7–15)
ANION GAP SERPL CALCULATED.3IONS-SCNC: 14 MMOL/L (ref 7–15)
ANION GAP SERPL CALCULATED.3IONS-SCNC: 15 MMOL/L (ref 7–15)
ANION GAP SERPL CALCULATED.3IONS-SCNC: 8 MMOL/L (ref 7–15)
ANION GAP SERPL CALCULATED.3IONS-SCNC: 9 MMOL/L (ref 7–15)
B-OH-BUTYR SERPL-SCNC: 0.2 MMOL/L
B-OH-BUTYR SERPL-SCNC: 0.6 MMOL/L
B-OH-BUTYR SERPL-SCNC: 1.8 MMOL/L
B-OH-BUTYR SERPL-SCNC: 1.8 MMOL/L
B-OH-BUTYR SERPL-SCNC: 2 MMOL/L
B-OH-BUTYR SERPL-SCNC: <0.2 MMOL/L
BASE EXCESS BLDV CALC-SCNC: 0.7 MMOL/L (ref -7.7–1.9)
BUN SERPL-MCNC: 18.7 MG/DL (ref 8–23)
BUN SERPL-MCNC: 19.1 MG/DL (ref 8–23)
BUN SERPL-MCNC: 19.9 MG/DL (ref 8–23)
BUN SERPL-MCNC: 20 MG/DL (ref 8–23)
BUN SERPL-MCNC: 21.8 MG/DL (ref 8–23)
BUN SERPL-MCNC: 22.9 MG/DL (ref 8–23)
CALCIUM SERPL-MCNC: 8.8 MG/DL (ref 8.8–10.2)
CALCIUM SERPL-MCNC: 8.8 MG/DL (ref 8.8–10.2)
CALCIUM SERPL-MCNC: 9 MG/DL (ref 8.8–10.2)
CALCIUM SERPL-MCNC: 9.1 MG/DL (ref 8.8–10.2)
CALCIUM SERPL-MCNC: 9.2 MG/DL (ref 8.8–10.2)
CALCIUM SERPL-MCNC: 9.4 MG/DL (ref 8.8–10.2)
CHLORIDE SERPL-SCNC: 100 MMOL/L (ref 98–107)
CHLORIDE SERPL-SCNC: 102 MMOL/L (ref 98–107)
CHLORIDE SERPL-SCNC: 104 MMOL/L (ref 98–107)
CHLORIDE SERPL-SCNC: 105 MMOL/L (ref 98–107)
CHLORIDE SERPL-SCNC: 105 MMOL/L (ref 98–107)
CHLORIDE SERPL-SCNC: 98 MMOL/L (ref 98–107)
CREAT SERPL-MCNC: 1.25 MG/DL (ref 0.67–1.17)
CREAT SERPL-MCNC: 1.3 MG/DL (ref 0.67–1.17)
CREAT SERPL-MCNC: 1.36 MG/DL (ref 0.67–1.17)
CREAT SERPL-MCNC: 1.41 MG/DL (ref 0.67–1.17)
CREAT SERPL-MCNC: 1.43 MG/DL (ref 0.67–1.17)
CREAT SERPL-MCNC: 1.45 MG/DL (ref 0.67–1.17)
DEPRECATED HCO3 PLAS-SCNC: 22 MMOL/L (ref 22–29)
DEPRECATED HCO3 PLAS-SCNC: 22 MMOL/L (ref 22–29)
DEPRECATED HCO3 PLAS-SCNC: 23 MMOL/L (ref 22–29)
DEPRECATED HCO3 PLAS-SCNC: 24 MMOL/L (ref 22–29)
DEPRECATED HCO3 PLAS-SCNC: 24 MMOL/L (ref 22–29)
DEPRECATED HCO3 PLAS-SCNC: 25 MMOL/L (ref 22–29)
GFR SERPL CREATININE-BSD FRML MDRD: 48 ML/MIN/1.73M2
GFR SERPL CREATININE-BSD FRML MDRD: 48 ML/MIN/1.73M2
GFR SERPL CREATININE-BSD FRML MDRD: 49 ML/MIN/1.73M2
GFR SERPL CREATININE-BSD FRML MDRD: 51 ML/MIN/1.73M2
GFR SERPL CREATININE-BSD FRML MDRD: 54 ML/MIN/1.73M2
GFR SERPL CREATININE-BSD FRML MDRD: 57 ML/MIN/1.73M2
GLUCOSE BLDC GLUCOMTR-MCNC: 107 MG/DL (ref 70–99)
GLUCOSE BLDC GLUCOMTR-MCNC: 110 MG/DL (ref 70–99)
GLUCOSE BLDC GLUCOMTR-MCNC: 120 MG/DL (ref 70–99)
GLUCOSE BLDC GLUCOMTR-MCNC: 130 MG/DL (ref 70–99)
GLUCOSE BLDC GLUCOMTR-MCNC: 154 MG/DL (ref 70–99)
GLUCOSE BLDC GLUCOMTR-MCNC: 201 MG/DL (ref 70–99)
GLUCOSE BLDC GLUCOMTR-MCNC: 203 MG/DL (ref 70–99)
GLUCOSE BLDC GLUCOMTR-MCNC: 244 MG/DL (ref 70–99)
GLUCOSE BLDC GLUCOMTR-MCNC: 286 MG/DL (ref 70–99)
GLUCOSE BLDC GLUCOMTR-MCNC: 323 MG/DL (ref 70–99)
GLUCOSE BLDC GLUCOMTR-MCNC: 331 MG/DL (ref 70–99)
GLUCOSE BLDC GLUCOMTR-MCNC: 380 MG/DL (ref 70–99)
GLUCOSE SERPL-MCNC: 132 MG/DL (ref 70–99)
GLUCOSE SERPL-MCNC: 145 MG/DL (ref 70–99)
GLUCOSE SERPL-MCNC: 181 MG/DL (ref 70–99)
GLUCOSE SERPL-MCNC: 248 MG/DL (ref 70–99)
GLUCOSE SERPL-MCNC: 319 MG/DL (ref 70–99)
GLUCOSE SERPL-MCNC: 361 MG/DL (ref 70–99)
HCO3 BLDV-SCNC: 25 MMOL/L (ref 21–28)
O2/TOTAL GAS SETTING VFR VENT: 99 %
PCO2 BLDV: 40 MM HG (ref 40–50)
PH BLDV: 7.42 [PH] (ref 7.32–7.43)
PO2 BLDV: 48 MM HG (ref 25–47)
POTASSIUM SERPL-SCNC: 3.7 MMOL/L (ref 3.4–5.3)
POTASSIUM SERPL-SCNC: 3.8 MMOL/L (ref 3.4–5.3)
POTASSIUM SERPL-SCNC: 3.9 MMOL/L (ref 3.4–5.3)
POTASSIUM SERPL-SCNC: 4.2 MMOL/L (ref 3.4–5.3)
SODIUM SERPL-SCNC: 136 MMOL/L (ref 136–145)
SODIUM SERPL-SCNC: 137 MMOL/L (ref 136–145)
SODIUM SERPL-SCNC: 137 MMOL/L (ref 136–145)
SODIUM SERPL-SCNC: 138 MMOL/L (ref 136–145)

## 2023-04-22 PROCEDURE — 97165 OT EVAL LOW COMPLEX 30 MIN: CPT | Mod: GO | Performed by: OCCUPATIONAL THERAPIST

## 2023-04-22 PROCEDURE — 97530 THERAPEUTIC ACTIVITIES: CPT | Mod: GO | Performed by: OCCUPATIONAL THERAPIST

## 2023-04-22 PROCEDURE — 80048 BASIC METABOLIC PNL TOTAL CA: CPT | Performed by: INTERNAL MEDICINE

## 2023-04-22 PROCEDURE — 120N000013 HC R&B IMCU

## 2023-04-22 PROCEDURE — 250N000013 HC RX MED GY IP 250 OP 250 PS 637: Performed by: HOSPITALIST

## 2023-04-22 PROCEDURE — 99232 SBSQ HOSP IP/OBS MODERATE 35: CPT | Performed by: INTERNAL MEDICINE

## 2023-04-22 PROCEDURE — 36415 COLL VENOUS BLD VENIPUNCTURE: CPT | Performed by: INTERNAL MEDICINE

## 2023-04-22 PROCEDURE — 120N000001 HC R&B MED SURG/OB

## 2023-04-22 PROCEDURE — 93970 EXTREMITY STUDY: CPT

## 2023-04-22 PROCEDURE — 258N000002 HC RX IP 258 OP 250: Performed by: INTERNAL MEDICINE

## 2023-04-22 PROCEDURE — 82010 KETONE BODYS QUAN: CPT | Performed by: INTERNAL MEDICINE

## 2023-04-22 PROCEDURE — 258N000003 HC RX IP 258 OP 636: Performed by: INTERNAL MEDICINE

## 2023-04-22 PROCEDURE — 250N000013 HC RX MED GY IP 250 OP 250 PS 637: Performed by: INTERNAL MEDICINE

## 2023-04-22 RX ORDER — DEXTROSE MONOHYDRATE 25 G/50ML
25-50 INJECTION, SOLUTION INTRAVENOUS
Status: DISCONTINUED | OUTPATIENT
Start: 2023-04-22 | End: 2023-04-24

## 2023-04-22 RX ORDER — NICOTINE POLACRILEX 4 MG
15-30 LOZENGE BUCCAL
Status: DISCONTINUED | OUTPATIENT
Start: 2023-04-22 | End: 2023-04-22

## 2023-04-22 RX ORDER — TAMSULOSIN HYDROCHLORIDE 0.4 MG/1
0.4 CAPSULE ORAL DAILY
Status: DISCONTINUED | OUTPATIENT
Start: 2023-04-23 | End: 2023-05-02 | Stop reason: HOSPADM

## 2023-04-22 RX ORDER — SODIUM CHLORIDE 450 MG/100ML
INJECTION, SOLUTION INTRAVENOUS CONTINUOUS
Status: DISCONTINUED | OUTPATIENT
Start: 2023-04-22 | End: 2023-04-23

## 2023-04-22 RX ORDER — IRBESARTAN 150 MG/1
150 TABLET ORAL AT BEDTIME
Status: DISCONTINUED | OUTPATIENT
Start: 2023-04-22 | End: 2023-05-02 | Stop reason: HOSPADM

## 2023-04-22 RX ORDER — DEXTROSE MONOHYDRATE 25 G/50ML
25-50 INJECTION, SOLUTION INTRAVENOUS
Status: DISCONTINUED | OUTPATIENT
Start: 2023-04-22 | End: 2023-04-22

## 2023-04-22 RX ORDER — NICOTINE POLACRILEX 4 MG
15-30 LOZENGE BUCCAL
Status: DISCONTINUED | OUTPATIENT
Start: 2023-04-22 | End: 2023-04-24

## 2023-04-22 RX ADMIN — INSULIN ASPART 2 UNITS: 100 INJECTION, SOLUTION INTRAVENOUS; SUBCUTANEOUS at 12:11

## 2023-04-22 RX ADMIN — SODIUM CHLORIDE 500 ML: 9 INJECTION, SOLUTION INTRAVENOUS at 16:19

## 2023-04-22 RX ADMIN — SODIUM CHLORIDE 500 ML: 9 INJECTION, SOLUTION INTRAVENOUS at 22:23

## 2023-04-22 RX ADMIN — IRBESARTAN 150 MG: 150 TABLET ORAL at 21:18

## 2023-04-22 RX ADMIN — CARVEDILOL 12.5 MG: 12.5 TABLET, FILM COATED ORAL at 09:59

## 2023-04-22 RX ADMIN — SODIUM CHLORIDE: 4.5 INJECTION, SOLUTION INTRAVENOUS at 22:34

## 2023-04-22 RX ADMIN — AMLODIPINE BESYLATE 5 MG: 5 TABLET ORAL at 10:00

## 2023-04-22 RX ADMIN — SODIUM CHLORIDE: 4.5 INJECTION, SOLUTION INTRAVENOUS at 13:12

## 2023-04-22 RX ADMIN — CARVEDILOL 12.5 MG: 12.5 TABLET, FILM COATED ORAL at 17:43

## 2023-04-22 ASSESSMENT — ACTIVITIES OF DAILY LIVING (ADL)
ADLS_ACUITY_SCORE: 30
ADLS_ACUITY_SCORE: 26
ADLS_ACUITY_SCORE: 26
ADLS_ACUITY_SCORE: 30
ADLS_ACUITY_SCORE: 26
ADLS_ACUITY_SCORE: 30
ADLS_ACUITY_SCORE: 26
ADLS_ACUITY_SCORE: 30
ADLS_ACUITY_SCORE: 26

## 2023-04-22 NOTE — PROVIDER NOTIFICATION
MD Notification    Notified Person: MD    Notified Person Name: Ana Jon    Notification Date/Time: 4/22 @ 1049     Notification Interaction: Kenan    Purpose of Notification: Critical lab - Ketones 1.80    Orders Received:    Comments:

## 2023-04-22 NOTE — PLAN OF CARE
Goal Outcome Evaluation:    Shift Note  Neuro: A&Ox4  CV: VSS. Tele-AFib CVR  Respiratory: LUCIANO, LS clear, on RA.   GI/: Continent, frequent and urgent urination. PVR >500, straight cathed @ 1230 with 600mL out. 1500 voided 125mL, PVR - 102.   Skin: +2 BLE edema. Scattered bruising, peeling/scabbing bilateral shins/ankles  Activity: A1 GB w/ equipment  Diet: Mod Carb. Kept NPO until BG stabilized.  IV: PIV x2. IVF switched to 1/2 NS @ 125mL/hr  Drips: Insulin ggt this AM. Stopped @ 1344.  BG: Currently BG checks AC/HS. Lantus added, along with Novolog sliding scale w/ meals.   Pain: Denies  Other:   Consults: OT/PT  Plan: Diabetic management, OT assessment with SLUMS score, monitor BP

## 2023-04-22 NOTE — PLAN OF CARE
Goal Outcome Evaluation:    Shift Note - Assumed cares [id number]  Neuro: A&Ox4, forgetful. Neuros intact.   CV: HTN, but now VSS. Tele - AFib CVR.   Respiratory: WDL, except LUCIANO, denies SOB  GI/: Continent. Urinal @ bedside.   Skin: Scattered bruising  Activity: A1 GB  Diet: Mod Carb  IV: PIV x2, 1/2 NS @ 100 mL/hr  Drips: n/a  BG: AC/HS - 110, 244  Pain: Denies  Other: Lower extremity US completed today. Critical Ketones - 1.8, 2.0  Consults: PT/OT, SW/CM  Plan: Diabetic management, OT SLUMS

## 2023-04-22 NOTE — PROVIDER NOTIFICATION
MD Notification    Notified Person: MD    Notified Person Name: Ana Jon    Notification Date/Time:  4/22 @ 1211    Notification Interaction: Kenan    Purpose of Notification: I see that pt no longer has tele orders, now that pt is no longer on insulin ggt do you feel he still needs IMC orders? If no, would you be able to dc the IMC orders? Thanks!       Orders Received: No more IMC needed    Comments:

## 2023-04-22 NOTE — PROVIDER NOTIFICATION
"Darling Paged Dr. Jon ar [phone] re: \"Bg prior to evening meal 380. 5 units insulin given. Recheck 331\"    Paged on-call Dr. Mart on darling the same page at [phone]    Call back right away - give 3 more units and monitor. Pt has hx of labile BG's  "

## 2023-04-22 NOTE — PROGRESS NOTES
04/22/23 1116   Appointment Info   Signing Clinician's Name / Credentials (OT) Josefina Banks OTR/L   Rehab Comments (OT) Initial Evaluation   Living Environment   People in Home spouse   Current Living Arrangements house   Home Accessibility stairs to enter home;stairs within home   Number of Stairs, Main Entrance 3   Number of Stairs, Within Home, Primary greater than 10 stairs  (has a stair lift chair of upstairs to bedrooms and basement)   Transportation Anticipated car, drives self   Self-Care   Regular Exercise No   Equipment Currently Used at Home grab bar, tub/shower;shower chair   Fall history within last six months yes   Number of times patient has fallen within last six months 2   General Information   Onset of Illness/Injury or Date of Surgery 04/20/23   Referring Physician Ana Jon MD   Patient/Family Therapy Goal Statement (OT) home   Additional Occupational Profile Info/Pertinent History of Current Problem Tc Garcia is a 83 year old male with a history of diabetes mellitus type 2, paroxysmal atrial fibrillation, hypertension, hyperlipidemia, pulmonary hypertension, spontaneous intracranial hemorrhage, recurrent pleural effusion, chronic kidney disease stage III, and anemia of chronic disease who presented to the ER with elevated blood sugars.  In the ER he was found to have diabetic ketoacidosis.   Existing Precautions/Restrictions fall   Cognitive Status Examination   Orientation Status orientation to person, place and time   Affect/Mental Status (Cognitive) WFL   Follows Commands WFL   Cognitive Screens/Assessments   Cognitive Assessments Completed [facility] Mental Status Exam (UMS):  Total Score out of /30 27   [facility] Norms 27-30 equals normal   [facility] Domains assessed: orientation, memory, attention, executive functions   SLUMS Interpretation Pt completed SLUMS to screen for safety with IADL's. pt scored 27/30 indicating no impairments in cognition. pt  reports some word finding difficulties at times, otherwise denies any memory or thinking deficits. pt feels like it's going ok at home with med mgmt, shopping, etc.   Visual Perception   Visual Impairment/Limitations corrective lenses for reading   Sensory   Sensory Comments denies   Pain Assessment   Patient Currently in Pain No   Range of Motion Comprehensive   Comment, General Range of Motion B UE AROM WFL   Strength Comprehensive (MMT)   Comment, General Manual Muscle Testing (MMT) Assessment B UE strength WFL, overall weakness   Bed Mobility   Supine-Sit [place] (Bed Mobility) supervision   Sit-Supine [place] (Bed Mobility) supervision   Transfer Skill: Bed to Chair/Chair to Bed   Bed-Chair [place] (Transfers) contact guard   Assistive Device (Bed-Chair Transfers) rolling walker   Sit-Stand Transfer   Sit-Stand [place] (Transfers)   (SBA)   Assistive Device (Sit-Stand Transfers) walker, front-wheeled   Toilet Transfer   [place] Level (Toilet Transfer)   (SBA)   Lower Body Dressing Assessment/Training   [place] Level (Lower Body Dressing) supervision  (reports uses a sock aide to doff/olvin socks at home.)   Toileting   [place] Level (Toileting) supervision   Clinical Impression   Criteria for Skilled Therapeutic Interventions Met (OT) Yes, treatment indicated   OT Diagnosis impaired I with ADLs and functional mobility   OT Problem List-Impairments impacting ADL problems related to;activity tolerance impaired;balance;cognition;strength   Assessment of Occupational Performance 1-3 Performance Deficits   Identified Performance Deficits impaired balance, actiivty tolerance, strength,   Planned Therapy Interventions (OT) ADL retraining;cognition;transfer training;home program guidelines;progressive activity/exercise   Clinical Decision Making Complexity (OT) low complexity   Risk & Benefits of therapy have been explained evaluation/treatment results reviewed;care plan/treatment  goals reviewed;risks/benefits reviewed;current/potential barriers reviewed;participants voiced agreement with care plan;participants included;patient   OT Total Evaluation Time   OT Eval, Low Complexity Minutes (45372) 10   OT Goals   Therapy Frequency (OT) 3 times/wk   OT Predicted Duration/Target Date for Goal Attainment 04/25/23   OT Goals Hygiene/Grooming;Upper Body Dressing;Lower Body Dressing;Toilet Transfer/Toileting;Cognition;OT Goal 1   OT: Hygiene/Grooming modified independent;while standing   OT: Upper Body Dressing Modified independent;Independent;including set-up/clothing retrieval   OT: Lower Body Dressing Modified independent;Independent;using adaptive equipment;including set-up/clothing retrieval   OT: Toilet Transfer/Toileting Modified independent;Independent;toilet transfer;cleaning and garment management;using adaptive equipment   OT: Cognitive Patient/caregiver will verbalize understanding of cognitive assessment results/recommendations as needed for safe discharge planning  (Completed SLUMS cognitive screen, scored 27/30 indicates normal cognition.)   OT: Goal 1 Pt to complete 4 pill bottle med box set up independently.   Therapeutic Activities   Therapeutic Activity Minutes (63922) 23   Symptoms noted during/after treatment fatigue   Treatment Detail/Skilled Intervention OT: sit <> stand with cues for hand placment wiht FWW wiht SBA/S, pt ambulates to BR with FWW S/SBA, completes toilet transfer with cues for grab bar use wiht SBA/S, pt ed in RTS and to insure holding onto sink or vanity vs toilet paper roll to decrease risk of falls. pt doesn't feel RTS is needed and is aware to be careful of toilet paper roll use for getting up and down fromtoilet. pt needing to 2-3 cues for FWW safety for sit <> stand transitions. pt ed in using tub chair at home for seated tub transfer to get in and out of tub at home, pt receptive to ed but unsure if he'll use tech at home.   OT Discharge Planning   OT  Plan 4 pill bottle med box set up. Pt scored 27/30 on SLUMS (normal) or LE dress with AE sock aide with retrieval and ww safety, bring info for tub transfer seated method   OT Discharge Recommendation (DC Rec) home with assist;home with home care occupational therapy   OT Rationale for DC Rec Pt reports I with ADL/IADL's and functional mobility with FWW use prior to admit, pt currently limited due to impaired baseline balance, impaired ww safety, strenght and activity tolerance. pt appears to be close to baseline, anticipate home with A initially with IADL's to insure safety with shopping, community mobility and home RN for med mgmt and OT for home safety eval for ADL/IADL's. Eventual SPENCER rec for increased safety at home.   OT Brief overview of current status pt completed SLUMS cognitive screen scored 27/30 indicating normal cognition, SBA/S for toilet trnasfer, sit <> stand and ambulation with FWW, cues needed 2-3x's for ww safety.   Total Session Time   Timed Code Treatment Minutes 23   Total Session Time (sum of timed and untimed services) 33

## 2023-04-22 NOTE — PROVIDER NOTIFICATION
MD Notification    Notified Person: MD    Notified Person Name: Ana Jon    Notification Date/Time: 4/22 @ 1535    Notification Interaction: Kenan    Purpose of Notification: Another critical Ketones, 2.0    Orders Received:    Comments:

## 2023-04-22 NOTE — PROGRESS NOTES
"[facility]  Hospitalist Progress Note    Date of Admission: 4/20/2023    Interval History   Patient really wanted to go home today but concerns of labile blood sugars   Using subcutaneous insulin in the hospital as not able to resume pump in the hospital     Assessment & Plan   Tc Garcia is a 83 year old male with a history of diabetes mellitus type 2, paroxysmal atrial fibrillation, hypertension, hyperlipidemia, pulmonary hypertension, spontaneous intracranial hemorrhage, recurrent pleural effusion, chronic kidney disease stage III, and anemia of chronic disease who presented to the ER with elevated blood sugars.  In the ER he was found to have diabetic ketoacidosis.  He was given IV fluids, potassium, and IV insulin.  The hospitalist service was contacted to admit him for further evaluation management.     Diabetic ketoacidosis  Diabetes mellitus type 2  Hemoglobin A1c 8.0 on 4/20/2023.  He has an insulin pump and has been using it, denies any interruptions or errors.  No recent changes in his diet.  No obvious inciting factor identified thus far.  In the ER he had a glucose of 547, ketones of 3.8, bicarb 18, anion gap 23.    Admit to inpatient.    DKA protocol with IV insulin, IV fluids, serial labs.    Insulin pump is on hold for now.    Prior concerns in Epic in 2/2023 while at [facility] that his DM was brittle. In addition reported cognitive impairment. IDT did not recommend patient return to home and needed a high level of care. The family declined. >> \"dad will not agree to move to [facility]\" \"he does what he wants to do\" [facility] report filed at the TCU     Chest x-ray with no new changes.  UA pending.  No obvious infection identified so far.    Suspect that his ability to manage DM and his insulin pump may not be safe    Discussion with the patient that he will need to convert over to subcu insulin.    Do not have the ability to resume his insulin pump in the hospital and he is not familiar with " it.    Reviewed records and there appears to be a lot of concern about the patient managing on prior encounters as noted.    Discussion with pharmacy and patient has basal rate of approximately 11 units daily.     Restarted on prior outpatient lantus 10 units.    Carb counts + sliding scale : 1 units 10 carbs and sliding scale     Bicarb is normal and AG normal    Ketones higher again at 2    Unable to access his pump for clarification. He has been on lantus 10 units at TCU in 2/2023 with labile blood sugars.     4/22 ketones slightly higher today.     4/22 discussion with why patient should stay in the hospital >> Will try to be able to contact endocrinology on Monday. ? Plan for the pump.      Acute kidney injury  Chronic kidney disease, stage III    Baseline creatinine appears to be around 1.2.  Creatinine up to 1.72 in the ER.    Patient received IV fluids.  His creatinine is elevated.    Known history of retention again noted this admission    Creatinine 1.62 but high PVR with straight cath required    May be post obstructive rather than all dehydration    Creatinine 1.3 >> closer to baseline      Lower extremity edema  Dyspnea on exertion  Has developed lower extremity edema and dyspnea on exertion over the last several days.  No prior history of congestive heart failure.    Echo with EF of 60 to 65%.  Right ventricle moderately dilated.  Moderately severe 3+ tricuspid regurgitation.  Moderate to severe pulmonary hypertension. (pulmonary hypertension on prior ECHO 2021)     4/22 venous dopplers negative of legs.      Urinary retention    Noted on prior hospital to stay.    Patient required intermittent straight cathing in the past.    Started on Flomax in January 2023    This admission noted again to have urinary retention requiring straight cath.  May be contributing to increase renal function.    Monitor renal function as post obstruction is removed    4/22 Now checking PVR with no indwelling cho >>  monitor       Paroxysmal atrial fibrillation    Not on anticoagulation due to recent intracranial hemorrhage.    Continue PTA carvedilol.     History of spontaneous intracranial hemorrhage  Admitted to [facility] in January 2023 due to nontraumatic intraventricular intracerebral hemorrhage which was managed nonoperatively.    Noted.    Patient presented with dizziness, lightheadedness and a new intraventricular hemorrhage.    He was given Kcentra for anticoagulation reversal.    Had reported history of multiple falls prior to admission but no known head trauma that he reported.     Hypertension  Blood pressure fairly well controlled in the ER.    Continue PTA amlodipine and carvedilol.    Hold PTA irbesartan in setting of acute kidney injury    Monitor BP closely.    Anticipate renal function may improve with straight caths..     Anemia of chronic disease  Hemoglobin 11.8 on 4/20/2023.    Recheck in AM.     Disposition    Case management involved.    In the past felt unsafe for patient to be at home with even a vulnerable adult report filed.    Interdisciplinary team's at the TCU did not feel he should go home but the family was okay with discharge as they felt the patient requested this    Again concerns about home safety    SLUMS 27/30 4/22 patient insists on going home and taking any risk involved.  He plans to go home on Monday >> will try to get the endocrinology clinic and plan for reattachment of his insulin pump. Also trouble shoot if pump having problems.     Diet: Moderate Consistent Carb (60 g CHO per Meal) Diet  Pruett Catheter: Not present  DVT Prophylaxis: compression pneumoboots   Code Status: No CPR- Do NOT Intubate       Expected Discharge Date: 04/24/2023,  3:00 PM    Destination: inpatient rehabilitation facility  Discharge Comments: DKA, MANPREET  needs SLUMS       SMILEY LUIS MD,   Hospitalist Service  [facility]  Hospital  ______________________________________________________________________    -Data reviewed today: I reviewed all new labs and imaging results over the last 24 hours. I personally reviewed no images or EKG's today.    Physical Exam   Temp: 97.5  F (36.4  C) Temp src: Oral BP: 128/74 Pulse: 78   Resp: 13 SpO2: 90 % O2 Device: None (Room air)    Vitals:    04/20/23 1801   Weight: 72.6 kg (160 lb)   Constitutional: Patient is in no acute distress.  Respiratory: Breath sounds CTA. No increased work of breathing.  Cardiovascular: RRR, no rub or murmur. No peripheral edema.  GI: Soft, non-tender, non-distended.  Skin: Warm, dry, no rashes or lesions.  Other: 1-2 + edema     Medications     - MEDICATION INSTRUCTIONS -       NaCl 100 mL/hr at 04/22/23 1312       sodium chloride 0.9%  500 mL Intravenous Once     amLODIPine  5 mg Oral Daily     carvedilol  12.5 mg Oral BID w/meals     insulin aspart  1-7 Units Subcutaneous TID AC     insulin aspart  1-5 Units Subcutaneous At Bedtime     insulin glargine  10 Units Subcutaneous Q24H       Data   Recent Labs   Lab 04/22/23  1424 04/22/23  1211 04/22/23  1040 04/22/23  1018 04/22/23  0703 04/22/23  0634 04/20/23  2032 04/20/23  1808 04/20/23  1804   WBC  --   --   --   --   --   --   --   --  11.6*   HGB  --   --   --   --   --   --   --   --  11.8*   MCV  --   --   --   --   --   --   --   --  88   PLT  --   --   --   --   --   --   --   --  238     --   --  138  --  138   < > 137  --    POTASSIUM 4.2  --   --  3.9  --  3.9   < > 4.5  --    CHLORIDE 100  --   --  102  --  105   < > 96*  --    CO2 25  --   --  22  --  22   < > 18*  --    BUN 19.9  --   --  18.7  --  20.0   < > 30.5*  --    CR 1.25*  --   --  1.36*  --  1.43*   < > 1.72*  --    ANIONGAP 13  --   --  14  --  11   < > 23*  --    LLOYD 9.0  --   --  9.1  --  9.2   < > 9.3  --    * 244* 203* 248*   < > 132*   < > 547*  --    ALBUMIN  --   --   --   --   --   --   --  4.3  --    PROTTOTAL  --   --    --   --   --   --   --  6.1*  --    BILITOTAL  --   --   --   --   --   --   --  1.1  --    ALKPHOS  --   --   --   --   --   --   --  92  --    ALT  --   --   --   --   --   --   --  6*  --    AST  --   --   --   --   --   --   --  16  --     < > = values in this interval not displayed.       Imaging:  Recent Results (from the past 24 hour(s))   US Lower Extremity Venous Duplex Bilateral    Narrative    EXAM: ULTRASOUND LOWER EXTREMITY VENOUS DUPLEX BILATERAL  LOCATION: [facility]  DATE/TIME: 04/22/2023, 12:09 PM CDT    INDICATION: Edema.  COMPARISON: None.  TECHNIQUE: Venous Duplex ultrasound of bilateral lower extremities with and without compression, augmentation and duplex. Color flow and spectral Doppler with waveform analysis performed.    FINDINGS: Exam includes the common femoral, femoral, popliteal veins as well as segmentally visualized deep calf veins and greater saphenous vein.     RIGHT: No deep vein thrombosis. No superficial thrombophlebitis. No popliteal cyst.    LEFT: No deep vein thrombosis. No superficial thrombophlebitis. No popliteal cyst.      Impression    IMPRESSION:  1.  No deep venous thrombosis in the bilateral lower extremities.

## 2023-04-22 NOTE — PROVIDER NOTIFICATION
"MD Notification    Notified Person: MD    Notified Person Name: MATTHEW VÁSQUEZ    Notification Date/Time: 4/21/23 1950    Notification Interaction: \"Critical Ketone result returned at 1.7. Also seeking clarification on IV fluids. Pt has good PO intake and a history of HF\"    Purpose of Notification:    Orders Received:    Comments:    "

## 2023-04-22 NOTE — PROVIDER NOTIFICATION
While on cross cover this evening I was paged by RN regarding patient's BG of 331 after 5u given. Per notes, patient appears to have labile diabetes. Was on insulin gtt yesterday. Will give 3 additional units of Novolog now and monitor.    Sonido Gr MD, MPH  Internal Medicine

## 2023-04-22 NOTE — PROGRESS NOTES
Cross Cover Note    Paged re: elevated BHB - repeat BMP, VBG ordered and pending; currently gap closed with stable VSS + glucose levels. Continuing fluids for 6 more hours given history of HFpEF.     Marlin Husain MD

## 2023-04-22 NOTE — PLAN OF CARE
Goal Outcome Evaluation:                 Outcome Evaluation: BG stable    A&O x 4. BP high to 150s SBP, otherwise VSS on RA. Tele: Afib CVR. Assistx1 w/ GB. Mod cho diet, tolerating well. PIV to R AC, SL. Pt experiencing some generalized weakness. Continent of B&B, voiding spontaneously into urinal at bedside. 3 BM this shift. Continue plan of care.

## 2023-04-23 ENCOUNTER — APPOINTMENT (OUTPATIENT)
Dept: PHYSICAL THERAPY | Facility: CLINIC | Age: 83
DRG: 638 | End: 2023-04-23
Attending: INTERNAL MEDICINE
Payer: COMMERCIAL

## 2023-04-23 LAB
ANION GAP SERPL CALCULATED.3IONS-SCNC: 10 MMOL/L (ref 7–15)
ANION GAP SERPL CALCULATED.3IONS-SCNC: 10 MMOL/L (ref 7–15)
B-OH-BUTYR SERPL-SCNC: 0.3 MMOL/L
B-OH-BUTYR SERPL-SCNC: <0.2 MMOL/L
BUN SERPL-MCNC: 11.4 MG/DL (ref 8–23)
BUN SERPL-MCNC: 14.8 MG/DL (ref 8–23)
CALCIUM SERPL-MCNC: 8.8 MG/DL (ref 8.8–10.2)
CALCIUM SERPL-MCNC: 9.1 MG/DL (ref 8.8–10.2)
CHLORIDE SERPL-SCNC: 101 MMOL/L (ref 98–107)
CHLORIDE SERPL-SCNC: 104 MMOL/L (ref 98–107)
CREAT SERPL-MCNC: 1.13 MG/DL (ref 0.67–1.17)
CREAT SERPL-MCNC: 1.14 MG/DL (ref 0.67–1.17)
DEPRECATED HCO3 PLAS-SCNC: 26 MMOL/L (ref 22–29)
DEPRECATED HCO3 PLAS-SCNC: 27 MMOL/L (ref 22–29)
GFR SERPL CREATININE-BSD FRML MDRD: 63 ML/MIN/1.73M2
GFR SERPL CREATININE-BSD FRML MDRD: 64 ML/MIN/1.73M2
GLUCOSE BLDC GLUCOMTR-MCNC: 123 MG/DL (ref 70–99)
GLUCOSE BLDC GLUCOMTR-MCNC: 130 MG/DL (ref 70–99)
GLUCOSE BLDC GLUCOMTR-MCNC: 141 MG/DL (ref 70–99)
GLUCOSE BLDC GLUCOMTR-MCNC: 147 MG/DL (ref 70–99)
GLUCOSE BLDC GLUCOMTR-MCNC: 149 MG/DL (ref 70–99)
GLUCOSE BLDC GLUCOMTR-MCNC: 215 MG/DL (ref 70–99)
GLUCOSE BLDC GLUCOMTR-MCNC: 268 MG/DL (ref 70–99)
GLUCOSE BLDC GLUCOMTR-MCNC: 52 MG/DL (ref 70–99)
GLUCOSE BLDC GLUCOMTR-MCNC: 55 MG/DL (ref 70–99)
GLUCOSE BLDC GLUCOMTR-MCNC: 57 MG/DL (ref 70–99)
GLUCOSE BLDC GLUCOMTR-MCNC: 79 MG/DL (ref 70–99)
GLUCOSE BLDC GLUCOMTR-MCNC: 96 MG/DL (ref 70–99)
GLUCOSE BLDC GLUCOMTR-MCNC: 97 MG/DL (ref 70–99)
GLUCOSE SERPL-MCNC: 135 MG/DL (ref 70–99)
GLUCOSE SERPL-MCNC: 226 MG/DL (ref 70–99)
POTASSIUM SERPL-SCNC: 3.4 MMOL/L (ref 3.4–5.3)
POTASSIUM SERPL-SCNC: 3.4 MMOL/L (ref 3.4–5.3)
SODIUM SERPL-SCNC: 138 MMOL/L (ref 136–145)
SODIUM SERPL-SCNC: 140 MMOL/L (ref 136–145)

## 2023-04-23 PROCEDURE — 99233 SBSQ HOSP IP/OBS HIGH 50: CPT | Performed by: INTERNAL MEDICINE

## 2023-04-23 PROCEDURE — 97116 GAIT TRAINING THERAPY: CPT | Mod: GP

## 2023-04-23 PROCEDURE — 97161 PT EVAL LOW COMPLEX 20 MIN: CPT | Mod: GP

## 2023-04-23 PROCEDURE — 82310 ASSAY OF CALCIUM: CPT | Performed by: INTERNAL MEDICINE

## 2023-04-23 PROCEDURE — 250N000013 HC RX MED GY IP 250 OP 250 PS 637

## 2023-04-23 PROCEDURE — 250N000013 HC RX MED GY IP 250 OP 250 PS 637: Performed by: HOSPITALIST

## 2023-04-23 PROCEDURE — 258N000001 HC RX 258: Performed by: INTERNAL MEDICINE

## 2023-04-23 PROCEDURE — 120N000001 HC R&B MED SURG/OB

## 2023-04-23 PROCEDURE — 250N000013 HC RX MED GY IP 250 OP 250 PS 637: Performed by: INTERNAL MEDICINE

## 2023-04-23 PROCEDURE — 258N000002 HC RX IP 258 OP 250: Performed by: INTERNAL MEDICINE

## 2023-04-23 PROCEDURE — 36415 COLL VENOUS BLD VENIPUNCTURE: CPT | Performed by: INTERNAL MEDICINE

## 2023-04-23 PROCEDURE — 82010 KETONE BODYS QUAN: CPT | Performed by: INTERNAL MEDICINE

## 2023-04-23 PROCEDURE — 120N000013 HC R&B IMCU

## 2023-04-23 RX ORDER — DEXTROSE MONOHYDRATE 25 G/50ML
25-50 INJECTION, SOLUTION INTRAVENOUS
Status: DISCONTINUED | OUTPATIENT
Start: 2023-04-23 | End: 2023-04-23

## 2023-04-23 RX ORDER — NICOTINE POLACRILEX 4 MG
15-30 LOZENGE BUCCAL
Status: DISCONTINUED | OUTPATIENT
Start: 2023-04-23 | End: 2023-04-23

## 2023-04-23 RX ORDER — FINASTERIDE 5 MG/1
5 TABLET, FILM COATED ORAL DAILY
Status: DISCONTINUED | OUTPATIENT
Start: 2023-04-23 | End: 2023-05-02 | Stop reason: HOSPADM

## 2023-04-23 RX ADMIN — SODIUM CHLORIDE: 4.5 INJECTION, SOLUTION INTRAVENOUS at 09:02

## 2023-04-23 RX ADMIN — Medication 1 MG: at 22:00

## 2023-04-23 RX ADMIN — CARVEDILOL 12.5 MG: 12.5 TABLET, FILM COATED ORAL at 08:42

## 2023-04-23 RX ADMIN — FINASTERIDE 5 MG: 5 TABLET, FILM COATED ORAL at 15:04

## 2023-04-23 RX ADMIN — DEXTROSE MONOHYDRATE 50 ML: 25 INJECTION, SOLUTION INTRAVENOUS at 03:32

## 2023-04-23 RX ADMIN — IRBESARTAN 150 MG: 150 TABLET ORAL at 22:00

## 2023-04-23 RX ADMIN — AMLODIPINE BESYLATE 5 MG: 5 TABLET ORAL at 08:42

## 2023-04-23 RX ADMIN — TAMSULOSIN HYDROCHLORIDE 0.4 MG: 0.4 CAPSULE ORAL at 08:42

## 2023-04-23 RX ADMIN — CARVEDILOL 12.5 MG: 12.5 TABLET, FILM COATED ORAL at 17:46

## 2023-04-23 ASSESSMENT — ACTIVITIES OF DAILY LIVING (ADL)
ADLS_ACUITY_SCORE: 30
ADLS_ACUITY_SCORE: 31
ADLS_ACUITY_SCORE: 31
ADLS_ACUITY_SCORE: 30
ADLS_ACUITY_SCORE: 31
ADLS_ACUITY_SCORE: 30
ADLS_ACUITY_SCORE: 31
ADLS_ACUITY_SCORE: 30
ADLS_ACUITY_SCORE: 31

## 2023-04-23 NOTE — PROGRESS NOTES
Writer spoke with pt's wife Radha after she called for an update.  Radha is worries that Tc will not be coming home and the possibility of assisted living.  She said she is not ready for that as she loves her home, neighborhood, helpful neighbors and two dogs and cat.  She stated that pt agrees he is not ready for assisted living either. She stated she does not drive.  Writer advised that social work could contact her to talk about future plans for pt and maybe home healthcare could be discussed.  Radha stated she would like to discuss that.  She also said she does not want pt back at [facility] as she didn't feel they were able to regulate what pt ate or his blood sugars.  Writer put in sticky note for Social work to call her.

## 2023-04-23 NOTE — PLAN OF CARE
Admitted 4/20/23 for DKA  Neuro: A&Ox4   Diet: Mod carb; pills whole.   Pain: Denies  Resp: LS clear; VSS on RA except, BP high to 150s SBP and high glucose.  Lines/Drains: R PIV infusing; L PIV S/L  Skin/Wounds: LE edema  GI/: Incontinent of B&B; medium BM this shift. Voiding spontaneously into urinal at bedside. Bladder scan showed 583 ml; straight cath 555 ml.  Vitals: Q8H   Ambulation/Assist: SBA; has generalized weakness  Summary: Glucose levels are very labile; continue close monitoring

## 2023-04-23 NOTE — PROVIDER NOTIFICATION
MD Notification    Notified Person: MD    Notified Person Name: Dr. Mart     Notification Date/Time: 2011 4/22     Notification Interaction: vocera paged     Purpose of Notification: critical ketone 1.8    Orders Received: BMP and ketone lab draws changed to q4h starting at midnight tonight.     Comments:

## 2023-04-23 NOTE — PROGRESS NOTES
04/23/23 1105   Appointment Info   Signing Clinician's Name / Credentials (PT) Yi Carmona, PT, DPT   Living Environment   People in Home spouse   Current Living Arrangements house   Home Accessibility stairs to enter home   Number of Stairs, Main Entrance 3   Stair Railings, Main Entrance railing on right side (ascending)   Number of Stairs, Within Home, Primary greater than 10 stairs  (has a stair lift chair of upstairs to bedrooms and basement)   Transportation Anticipated family or friend will provide   Self-Care   Usual Activity Tolerance good   Current Activity Tolerance moderate   Regular Exercise No   Equipment Currently Used at Home grab bar, tub/shower;shower chair   Fall history within last six months yes   Number of times patient has fallen within last six months 2   Activity/Exercise/Self-Care Comment pt reports mostly walks inside his home without an AD-reports will hold onto furniture at times.   General Information   Onset of Illness/Injury or Date of Surgery 04/20/23   Referring Physician Ana Jon MD   Patient/Family Therapy Goals Statement (PT) to go home   Pertinent History of Current Problem (include personal factors and/or comorbidities that impact the POC) 83 year old male with a history of diabetes mellitus type 2, paroxysmal atrial fibrillation, hypertension, hyperlipidemia, pulmonary hypertension, spontaneous intracranial hemorrhage, recurrent pleural effusion, chronic kidney disease stage III, and anemia of chronic disease who presented to the ER with elevated blood sugars.  In the ER he was found to have diabetic ketoacidosis.   Existing Precautions/Restrictions fall   General Observations resting in bed, NAD, insulin pump in stomach   Cognition   Affect/Mental Status (Cognition) WFL   Orientation Status (Cognition) person;place;situation;oriented to   Follows Commands (Cognition) WFL   Cognitive Status Comments per OT eval on 4/22: SLUMS: 27/30   Pain Assessment   Patient  Currently in Pain No   Integumentary/Edema   Integumentary/Edema Comments edema in lower legs   Posture    Posture Forward head position   Range of Motion (ROM)   Range of Motion ROM is WFL   Strength (Manual Muscle Testing)   Strength (Manual Muscle Testing) strength is WFL  (grossly)   Bed Mobility   Comment, (Bed Mobility) mod IND supine to sit   Transfers   Comment, (Transfers) SBA with sit to stand with FWW   Gait/Stairs (Locomotion)   Izard Level (Gait) minimum assist (75% patient effort)   Assistive Device (Gait) walker, front-wheeled   Distance in Feet 15ft   Pattern (Gait) step-through   Comment, (Gait/Stairs) Pt with gross LOB required min A within first few steps away from the bed, then CGA to BR, however pt abandonded the walker at the door of the BR, min A for safety to toilet.   Balance   Balance Comments IND with donning socks in figure 4 positiong sitting at EOB. impaired dynamic balance   Sensory Examination   Sensory Perception patient reports no sensory changes   Clinical Impression   Criteria for Skilled Therapeutic Intervention Yes, treatment indicated   PT Diagnosis (PT) impaired gait   Influenced by the following impairments impaired balance   Functional limitations due to impairments impaired safety with IND mobility, increased fall risk   Clinical Presentation (PT Evaluation Complexity) Stable/Uncomplicated   Clinical Presentation Rationale clinical judgement   Clinical Decision Making (Complexity) low complexity   Planned Therapy Interventions (PT) balance training;home exercise program;gait training;patient/family education;stair training;home program guidelines;progressive activity/exercise;risk factor education;transfer training;strengthening   Risk & Benefits of therapy have been explained evaluation/treatment results reviewed;care plan/treatment goals reviewed;participants included;patient   PT Total Evaluation Time   PT Eval, Low Complexity Minutes (24258) 12   Physical Therapy  Goals   PT Frequency Daily   PT Predicted Duration/Target Date for Goal Attainment 04/30/23   PT Goals Transfers;Gait;Stairs   PT: Transfers Modified independent;Sit to/from stand;Assistive device   PT: Gait Modified independent;Rolling walker;Greater than 200 feet   PT: Stairs 3 stairs;Rail on right;Supervision/stand-by assist   Interventions   Interventions Quick Adds Therapeutic Activity;Gait Training   Therapeutic Activity   Therapeutic Activities: dynamic activities to improve functional performance Minutes (10111) 4   Symptoms Noted During/After Treatment None   Treatment Detail/Skilled Intervention IND with seated pericares, SBA with sit <> stand from toilet with FWW, CGA with turning in BR for few steps for sinkside handwashing with SBA. Discussed rehab options. Pt declining home care or TCU options. Pt left in bed with needs in reach and alarm on.   Gait Training   Gait Training Minutes (72264) 14   Symptoms Noted During/After Treatment (Gait Training) none   Treatment Detail/Skilled Intervention Amb in hallway 250ft with FWW, decreased balance, increased pace, CGA with some unsteadiness. amb another 200ft without AD with min A, increased lateral gait deviation with head turning and turning. Discussed with pt recs for FWW vs. without AD for increased safety with balance. pt receptive to this.   PT Discharge Planning   PT Plan standing balance, stairs, safety with standing reaching tasks, amb with challenges.   PT Discharge Recommendation (DC Rec) Transitional Care Facility;home with assist;home with home care physical therapy   PT Rationale for DC Rec Pt demos good activity tolerance, however impairements with balance leading to increased fall risk, pt would benefit from TCU to increase safety and balance prior to return home, however pt refusing. Discussed home care, however refusing that as well. Did recommend consistent use of FWW to decrease fall risk, pt was receptive to this.   PT Brief overview of  current status min A with amb with FWW, pt did have 1 gross LOB, required assist to regain balance.   Total Session Time   Timed Code Treatment Minutes 18   Total Session Time (sum of timed and untimed services) 30

## 2023-04-23 NOTE — PROVIDER NOTIFICATION
While on cross cover this evening I was paged by RN regarding patient's critical ketone value. Discussed that I'm not familiar with the utility of that lab, however patient's anion gap appears to be widening on his last few BMPs (though is still in normal range). Discussed need for closer monitor of patient. Labs currently ordered q2hr which RN feels is likely unrealistic in patient not on IMC status. We will try for q3hr or q4hr. If AG opens up again, likely needs to go back on insulin gtt.    Sonido Gr MD, MPH  Internal Medicine

## 2023-04-23 NOTE — PROGRESS NOTES
Urology Brief Note    Consult received for this 84 yo gentleman with BPH admitted with DKA. Experiencing urinary retention. Straight cathed several times since admission, most recent straight cath overnight for >700cc clear urine.     I spoke with RN on phone this afternoon who reports patient has been voiding better today, good UOP. No recent bladder scans. Of note, admitted with ABBIE, unclear if d/t retention vs. his DKA. Cr improving 1.6 --> 1.13    Patient follows with urologist Dr Olvera. Last seen 1 month ago in clinic at which time finasteride was started due to history of intermittent urinary retention as we are seeing this admission.     Plan:  - Continue Flomax and Finasteride   - Encourage ambulation and fluids as able  - Will continue bladder scans. If PVR >400cc, place cho catheter and our team can arrange for TOV in urology clinic in 1-2 weeks.   - Not yet seen for formal consultation.     Rayne Call PA-C (Jackie)  [facility]  Office: [phone]

## 2023-04-23 NOTE — PLAN OF CARE
Goal Outcome Evaluation:       A&O x's 4. VSS on R/A Lungs sounds - Diminished. Bowel Sounds - normoactive, 1 BM during shift. Urine Output - urology consult today, Pt retaining urine, catheter was placed due to bladder scan of 497 mL. and per plan of care. Ambulation - Assist of 1 with gait belt. Diet - Mod carb, carb count for BS monitoring.  Pain controlled by - Denied pain during shift. Providers trying to get BS's under control. Endocrinology will be getting involved. Writer wrote sticky note for Social work to call pt's wife.

## 2023-04-23 NOTE — PLAN OF CARE
Vital signs stable on room air. Alert and oriented. Lung sounds diminished. Bowel sounds active, flatus present. Patient denies pain this shift. Up assist of one. Tolerating mod carb  diet. CMS/neuros intact. Straight cath for 700. BG dropped in to 50's, continuing frequent checks.

## 2023-04-23 NOTE — PROGRESS NOTES
"[facility]  Hospitalist Progress Note    Date of Admission: 4/20/2023    Interval History   Patient appears to be comfortable.  Eating breakfast.  Has Pruett catheter placed now for urinary retention.  Blood sugars very labile with increased to 300s and afternoon and evening yesterday.  Positive ketones seen.  Blood sugars better today however did have hypoglycemia overnight with blood sugar in the 50s    Assessment & Plan   Tc Garcia is a 83 year old male with a history of diabetes mellitus type 2, paroxysmal atrial fibrillation, hypertension, hyperlipidemia, pulmonary hypertension, spontaneous intracranial hemorrhage, recurrent pleural effusion, chronic kidney disease stage III, and anemia of chronic disease who presented to the ER with elevated blood sugars.  In the ER he was found to have diabetic ketoacidosis.  He was given IV fluids, potassium, and IV insulin.  The hospitalist service was contacted to admit him for further evaluation management.     Diabetic ketoacidosis  Diabetes mellitus type 1  Brittle/labile blood sugars   Hemoglobin A1c 8.0 on 4/20/2023.  He has an insulin pump at home and has been using it, denies any interruptions or errors.  No recent changes in his diet.  No obvious inciting factor identified thus far.  In the ER he had a glucose of 547, ketones of 3.8, bicarb 18, anion gap 23.    Admit to inpatient.    4/20 admitted under the DKA protocol>> stopped on 4/21    Insulin pump is on hold for now.>  While in the hospital given lability holding on his insulin pump    Prior concerns in Epic in 2/2023 while at TCU that his DM was brittle. In addition reported cognitive impairment. IDT did not recommend patient return to home and needed a high level of care. The family declined. >> \"dad will not agree to move to [facility]\" \"he does what he wants to do\" [facility] report filed at the TCU     Chest x-ray with no new changes.  UA negative and urine culture negative.  No sign of " infection.    Given the lability and brittle nature of his diabetes suspect ongoing challenges to diabetes control    Unable to access his pump for clarification. He has been on lantus 10 units at TCU in 2/2023 with labile blood sugars.     4/23 multiple changes in his insulin regimen this hospital stay: Difficult to know his home regimen given no access to his pump.    4/23 will continue 10 units Lantus at supper (14 on 4/22 given but had blood sugar 50 at night)     Continue with 1 unit per 10 carbs meal coverage plus sliding scale.    4/23 review of labs shows normal bicarb and gap with resolution of ketones    Plan to talk with his endocrine clinic on Monday 4/24 and determine at some point if he could go to the clinic to get his pump reattached shortly after leaving the hospital. (Would assess his diabetic needs and also ensure that his pump is functioning appropriately)      Acute kidney injury  Chronic kidney disease, stage III    Baseline creatinine appears to be around 1.2.  Creatinine up to 1.72 in the ER.    Patient received IV fluids.  His creatinine is elevated.    Known history of retention again noted this admission    Creatinine 1.62 but high PVR with straight cath required    May be post obstructive rather than all dehydration    Creatinine 1.13     Lower extremity edema  Dyspnea on exertion  Has developed lower extremity edema and dyspnea on exertion over the last several days.  No prior history of congestive heart failure.    Echo with EF of 60 to 65%.  Right ventricle moderately dilated.  Moderately severe 3+ tricuspid regurgitation.  Moderate to severe pulmonary hypertension. (pulmonary hypertension on prior ECHO 2021)     4/22 venous dopplers negative of legs.      Urinary retention    Noted on prior hospital to stay.    Patient required intermittent straight cathing in the past.    Started on Flomax in January 2023    This admission noted again to have urinary retention requiring straight  cath.  May be contributing to increase renal function.    Monitor renal function as post obstruction is removed    4/23 Pruett catheter placed with recurrent retention >> requested urology consult    Suspect he will need an indwelling Pruett catheter.      Paroxysmal atrial fibrillation    Not on anticoagulation due to recent intracranial hemorrhage.    Continue PTA carvedilol.     History of spontaneous intracranial hemorrhage  Admitted to [facility] in January 2023 due to nontraumatic intraventricular intracerebral hemorrhage which was managed nonoperatively.    Noted.    Patient presented with dizziness, lightheadedness and a new intraventricular hemorrhage.    He was given Kcentra for anticoagulation reversal.    Had reported history of multiple falls prior to admission but no known head trauma that he reported.     Hypertension  Blood pressure fairly well controlled in the ER.    Continue PTA amlodipine and carvedilol.    Hold PTA irbesartan in setting of acute kidney injury    Monitor BP closely.    Anticipate renal function may improve with straight caths..     Anemia of chronic disease  Hemoglobin 11.8 on 4/20/2023.    Recheck in AM.    Hemoglobin 11.8 on last check and repeat on 4/24     Disposition    Case management involved.    In the past felt unsafe for patient to be at home with even a vulnerable adult report filed.    Interdisciplinary team's at the TCU did not feel he should go home but the family was okay with discharge as they felt the patient requested this    Again concerns about home safety    SLUMS 27/30 4/22 patient insisted that he is going home irregardless of any risk.  He is continued to convey this message. Will try to contact the endocrinology clinic and plan for reattachment of his insulin pump. Also trouble shoot if pump having problems.     Diet: Moderate Consistent Carb (60 g CHO per Meal) Diet  Pruett Catheter: Not present  DVT Prophylaxis: compression pneumoboots   Code  Status: No CPR- Do NOT Intubate      Expected Discharge Date: 04/24/2023,  3:00 PM    Destination: inpatient rehabilitation facility  Discharge Comments: DKA, MANPREET  needs SLUMS       SMILEY LUIS MD,   Hospitalist Service  [facility]  ______________________________________________________________________    -Data reviewed today: I reviewed all new labs and imaging results over the last 24 hours. I personally reviewed no images or EKG's today.    Physical Exam   Temp: 98.1  F (36.7  C) Temp src: Oral BP: (!) 151/83 Pulse: 67   Resp: 18 SpO2: 96 % O2 Device: None (Room air)    Vitals:    04/20/23 1801   Weight: 72.6 kg (160 lb)   Constitutional: Patient is in no acute distress.  Respiratory: Breath sounds CTA. No increased work of breathing.  Cardiovascular: RRR, no rub or murmur.   GI: Soft, non-tender, non-distended.  Skin: Warm, dry, no rashes or lesions.  Other: 1-2 + edema   Patient has a Pruett catheter in place    Medications     - MEDICATION INSTRUCTIONS -       NaCl 100 mL/hr at 04/23/23 0902       amLODIPine  5 mg Oral Daily     carvedilol  12.5 mg Oral BID w/meals     insulin aspart   Subcutaneous TID AC     insulin aspart  1-7 Units Subcutaneous TID AC     insulin aspart  1-5 Units Subcutaneous At Bedtime     insulin glargine  14 Units Subcutaneous Daily with supper     irbesartan  150 mg Oral At Bedtime     tamsulosin  0.4 mg Oral Daily       Data   Recent Labs   Lab 04/23/23  0947 04/23/23  0810 04/23/23  0737 04/23/23  0528 04/22/23  1922 04/22/23  1910 04/22/23  1656 04/22/23  1424 04/20/23  2032 04/20/23  1808 04/20/23  1804   WBC  --   --   --   --   --   --   --   --   --   --  11.6*   HGB  --   --   --   --   --   --   --   --   --   --  11.8*   MCV  --   --   --   --   --   --   --   --   --   --  88   PLT  --   --   --   --   --   --   --   --   --   --  238   NA  --   --   --  140  --  136  --  138   < > 137  --    POTASSIUM  --   --   --  3.4  --  3.8  --  4.2   < >  4.5  --    CHLORIDE  --   --   --  104  --  98  --  100   < > 96*  --    CO2  --   --   --  26  --  23  --  25   < > 18*  --    BUN  --   --   --  14.8  --  19.1  --  19.9   < > 30.5*  --    CR  --   --   --  1.13  --  1.30*  --  1.25*   < > 1.72*  --    ANIONGAP  --   --   --  10  --  15  --  13   < > 23*  --    LLOYD  --   --   --  8.8  --  9.4  --  9.0   < > 9.3  --    * 97 96 135*   < > 319*   < > 361*   < > 547*  --    ALBUMIN  --   --   --   --   --   --   --   --   --  4.3  --    PROTTOTAL  --   --   --   --   --   --   --   --   --  6.1*  --    BILITOTAL  --   --   --   --   --   --   --   --   --  1.1  --    ALKPHOS  --   --   --   --   --   --   --   --   --  92  --    ALT  --   --   --   --   --   --   --   --   --  6*  --    AST  --   --   --   --   --   --   --   --   --  16  --     < > = values in this interval not displayed.       Imaging:  Recent Results (from the past 24 hour(s))   US Lower Extremity Venous Duplex Bilateral    Narrative    EXAM: ULTRASOUND LOWER EXTREMITY VENOUS DUPLEX BILATERAL  LOCATION: [facility]  DATE/TIME: 04/22/2023, 12:09 PM CDT    INDICATION: Edema.  COMPARISON: None.  TECHNIQUE: Venous Duplex ultrasound of bilateral lower extremities with and without compression, augmentation and duplex. Color flow and spectral Doppler with waveform analysis performed.    FINDINGS: Exam includes the common femoral, femoral, popliteal veins as well as segmentally visualized deep calf veins and greater saphenous vein.     RIGHT: No deep vein thrombosis. No superficial thrombophlebitis. No popliteal cyst.    LEFT: No deep vein thrombosis. No superficial thrombophlebitis. No popliteal cyst.      Impression    IMPRESSION:  1.  No deep venous thrombosis in the bilateral lower extremities.

## 2023-04-24 ENCOUNTER — APPOINTMENT (OUTPATIENT)
Dept: PHYSICAL THERAPY | Facility: CLINIC | Age: 83
DRG: 638 | End: 2023-04-24
Payer: COMMERCIAL

## 2023-04-24 ENCOUNTER — APPOINTMENT (OUTPATIENT)
Dept: OCCUPATIONAL THERAPY | Facility: CLINIC | Age: 83
DRG: 638 | End: 2023-04-24
Payer: COMMERCIAL

## 2023-04-24 ENCOUNTER — APPOINTMENT (OUTPATIENT)
Dept: CT IMAGING | Facility: CLINIC | Age: 83
DRG: 638 | End: 2023-04-24
Payer: COMMERCIAL

## 2023-04-24 LAB
ANION GAP SERPL CALCULATED.3IONS-SCNC: 10 MMOL/L (ref 7–15)
ANION GAP SERPL CALCULATED.3IONS-SCNC: 8 MMOL/L (ref 7–15)
B-OH-BUTYR SERPL-SCNC: 0.2 MMOL/L
B-OH-BUTYR SERPL-SCNC: 1.3 MMOL/L
BUN SERPL-MCNC: 8.1 MG/DL (ref 8–23)
BUN SERPL-MCNC: 8.9 MG/DL (ref 8–23)
CALCIUM SERPL-MCNC: 9.1 MG/DL (ref 8.8–10.2)
CALCIUM SERPL-MCNC: 9.2 MG/DL (ref 8.8–10.2)
CHLORIDE SERPL-SCNC: 100 MMOL/L (ref 98–107)
CHLORIDE SERPL-SCNC: 104 MMOL/L (ref 98–107)
CREAT SERPL-MCNC: 1.08 MG/DL (ref 0.67–1.17)
CREAT SERPL-MCNC: 1.18 MG/DL (ref 0.67–1.17)
DEPRECATED HCO3 PLAS-SCNC: 28 MMOL/L (ref 22–29)
DEPRECATED HCO3 PLAS-SCNC: 30 MMOL/L (ref 22–29)
GFR SERPL CREATININE-BSD FRML MDRD: 61 ML/MIN/1.73M2
GFR SERPL CREATININE-BSD FRML MDRD: 68 ML/MIN/1.73M2
GLUCOSE BLDC GLUCOMTR-MCNC: 110 MG/DL (ref 70–99)
GLUCOSE BLDC GLUCOMTR-MCNC: 112 MG/DL (ref 70–99)
GLUCOSE BLDC GLUCOMTR-MCNC: 120 MG/DL (ref 70–99)
GLUCOSE BLDC GLUCOMTR-MCNC: 164 MG/DL (ref 70–99)
GLUCOSE BLDC GLUCOMTR-MCNC: 198 MG/DL (ref 70–99)
GLUCOSE BLDC GLUCOMTR-MCNC: 224 MG/DL (ref 70–99)
GLUCOSE BLDC GLUCOMTR-MCNC: 77 MG/DL (ref 70–99)
GLUCOSE BLDC GLUCOMTR-MCNC: 83 MG/DL (ref 70–99)
GLUCOSE BLDC GLUCOMTR-MCNC: 86 MG/DL (ref 70–99)
GLUCOSE BLDC GLUCOMTR-MCNC: 91 MG/DL (ref 70–99)
GLUCOSE BLDC GLUCOMTR-MCNC: 94 MG/DL (ref 70–99)
GLUCOSE BLDC GLUCOMTR-MCNC: 96 MG/DL (ref 70–99)
GLUCOSE SERPL-MCNC: 236 MG/DL (ref 70–99)
GLUCOSE SERPL-MCNC: 92 MG/DL (ref 70–99)
POTASSIUM SERPL-SCNC: 3.7 MMOL/L (ref 3.4–5.3)
POTASSIUM SERPL-SCNC: 3.8 MMOL/L (ref 3.4–5.3)
SODIUM SERPL-SCNC: 138 MMOL/L (ref 136–145)
SODIUM SERPL-SCNC: 142 MMOL/L (ref 136–145)

## 2023-04-24 PROCEDURE — 250N000013 HC RX MED GY IP 250 OP 250 PS 637

## 2023-04-24 PROCEDURE — 120N000001 HC R&B MED SURG/OB

## 2023-04-24 PROCEDURE — 36415 COLL VENOUS BLD VENIPUNCTURE: CPT | Performed by: INTERNAL MEDICINE

## 2023-04-24 PROCEDURE — 99233 SBSQ HOSP IP/OBS HIGH 50: CPT | Performed by: INTERNAL MEDICINE

## 2023-04-24 PROCEDURE — 82310 ASSAY OF CALCIUM: CPT | Performed by: INTERNAL MEDICINE

## 2023-04-24 PROCEDURE — 250N000013 HC RX MED GY IP 250 OP 250 PS 637: Performed by: HOSPITALIST

## 2023-04-24 PROCEDURE — 97530 THERAPEUTIC ACTIVITIES: CPT | Mod: GO | Performed by: OCCUPATIONAL THERAPIST

## 2023-04-24 PROCEDURE — 82010 KETONE BODYS QUAN: CPT | Performed by: INTERNAL MEDICINE

## 2023-04-24 PROCEDURE — 99207 PR NO BILLABLE SERVICE THIS VISIT: CPT | Performed by: INTERNAL MEDICINE

## 2023-04-24 PROCEDURE — 250N000013 HC RX MED GY IP 250 OP 250 PS 637: Performed by: INTERNAL MEDICINE

## 2023-04-24 PROCEDURE — 120N000013 HC R&B IMCU

## 2023-04-24 PROCEDURE — 70450 CT HEAD/BRAIN W/O DYE: CPT

## 2023-04-24 PROCEDURE — 97116 GAIT TRAINING THERAPY: CPT | Mod: GP | Performed by: PHYSICAL THERAPIST

## 2023-04-24 RX ORDER — TAMSULOSIN HYDROCHLORIDE 0.4 MG/1
0.4 CAPSULE ORAL DAILY
Qty: 30 CAPSULE | Refills: 0 | Status: SHIPPED | OUTPATIENT
Start: 2023-04-25 | End: 2023-05-26

## 2023-04-24 RX ORDER — DEXTROSE MONOHYDRATE 25 G/50ML
25-50 INJECTION, SOLUTION INTRAVENOUS
Status: DISCONTINUED | OUTPATIENT
Start: 2023-04-24 | End: 2023-04-27

## 2023-04-24 RX ORDER — NICOTINE POLACRILEX 4 MG
15-30 LOZENGE BUCCAL
Status: DISCONTINUED | OUTPATIENT
Start: 2023-04-24 | End: 2023-04-28

## 2023-04-24 RX ORDER — FINASTERIDE 5 MG/1
5 TABLET, FILM COATED ORAL DAILY
Refills: 0 | Status: ON HOLD
Start: 2023-04-25 | End: 2023-01-01

## 2023-04-24 RX ORDER — AMLODIPINE BESYLATE 5 MG/1
5 TABLET ORAL DAILY
Refills: 0
Start: 2023-04-25 | End: 2023-05-26

## 2023-04-24 RX ADMIN — AMLODIPINE BESYLATE 5 MG: 5 TABLET ORAL at 08:13

## 2023-04-24 RX ADMIN — IRBESARTAN 150 MG: 150 TABLET ORAL at 21:51

## 2023-04-24 RX ADMIN — FINASTERIDE 5 MG: 5 TABLET, FILM COATED ORAL at 08:12

## 2023-04-24 RX ADMIN — CARVEDILOL 12.5 MG: 12.5 TABLET, FILM COATED ORAL at 08:12

## 2023-04-24 RX ADMIN — CARVEDILOL 12.5 MG: 12.5 TABLET, FILM COATED ORAL at 18:08

## 2023-04-24 RX ADMIN — TAMSULOSIN HYDROCHLORIDE 0.4 MG: 0.4 CAPSULE ORAL at 08:13

## 2023-04-24 ASSESSMENT — ACTIVITIES OF DAILY LIVING (ADL)
ADLS_ACUITY_SCORE: 31

## 2023-04-24 NOTE — PROGRESS NOTES
[facility]  Hospitalist Progress Note    Date of Admission: 4/20/2023    Interval History   Multiple conversations regarding the patient's diabetic care today.  Contacted his endocrinology office.  His outpatient endocrinologist has significant concerns about him using the pump and his ability to manage this without having complications  His endocrine office does not want to continue with his insulin pump.  Recommend that he follows up with his primary regarding his diabetes.  Attempting to get patient to a care facility as he has very brittle diabetes with high risk of trying to manage any of this at home.   Spoke with his daughter as well and her parents are struggling with her mom having cognitive impairment not wanting to go to a more structured living environment.  Her dad's trying to care for the mother at home.  Daughter has been trying to get her parents into a different living situation but they have been reluctant given pets  Patient will try to do his own blood sugars while in the hospital with carb counting and sliding scale.    Assessment & Plan   Tc Garcia is a 83 year old male with a history of diabetes mellitus type 2, paroxysmal atrial fibrillation, hypertension, hyperlipidemia, pulmonary hypertension, spontaneous intracranial hemorrhage, recurrent pleural effusion, chronic kidney disease stage III, and anemia of chronic disease who presented to the ER with elevated blood sugars.  In the ER he was found to have diabetic ketoacidosis.  He was given IV fluids, potassium, and IV insulin.  The hospitalist service was contacted to admit him for further evaluation management.     Diabetic ketoacidosis  Diabetes mellitus type 1  Brittle/labile blood sugars   Hemoglobin A1c 8.0 on 4/20/2023.  He has an insulin pump at home and has been using it, denies any interruptions or errors.  No recent changes in his diet.  No obvious inciting factor identified thus far.  In the ER he had  "a glucose of 547, ketones of 3.8, bicarb 18, anion gap 23.    Admit to inpatient.    4/20 admitted under the DKA protocol>> stopped on 4/21    Insulin pump is on hold for now.>  While in the hospital given lability holding on his insulin pump    Prior concerns in Epic in 2/2023 while at TCU that his DM was brittle. In addition reported cognitive impairment. IDT did not recommend patient return to home and needed a high level of care. The family declined. >> \"dad will not agree to move to [facility]\" \"he does what he wants to do\" [facility] report filed at the TCU     Chest x-ray with no new changes.  UA negative and urine culture negative.  No sign of infection.    Given the lability and brittle nature of his diabetes suspect ongoing challenges to diabetes control    4/24 called primary endocrinology clinic.  His primary endocrinologist is concerned about the patient continuing on his insulin pump and wants to stop it.    In agreement the patient should likely be in a different living situation. HE has very brittle DM. At risk of extreme glucose changes and hypoglycemia.    4/24 nursing staff will assess patient's ability to manage subcu insulin which suspect will be very challenging.    Continue with current insulin plan Lantus 10 units at at bedtime:     Insulin to carb ratio 1:10 for breakfast lunch and 1-15 for supper with a sliding scale adjustment med     Acute kidney injury  Chronic kidney disease, stage III    Baseline creatinine appears to be around 1.2.  Creatinine up to 1.72 in the ER.    Patient received IV fluids.  His creatinine is elevated.    Known history of retention again noted this admission    Creatinine 1.62 but high PVR with straight cath required    May be post obstructive rather than all dehydration    Creatinine 1.18     Lower extremity edema  Dyspnea on exertion  Has developed lower extremity edema and dyspnea on exertion over the last several days.  No prior history of congestive heart failure.    Echo " with EF of 60 to 65%.  Right ventricle moderately dilated.  Moderately severe 3+ tricuspid regurgitation.  Moderate to severe pulmonary hypertension. (pulmonary hypertension on prior ECHO 2021)     4/22 venous dopplers negative of legs.      Urinary retention    Noted on prior hospital to stay.    Patient required intermittent straight cathing in the past.    Started on Flomax in January 2023    This admission noted again to have urinary retention requiring straight cath.  May be contributing to increase renal function.    Monitor renal function as post obstruction is removed    4/23 Pruett catheter placed with recurrent retention >> requested urology consult    Suspect he will need an indwelling Pruett catheter.      Paroxysmal atrial fibrillation    Not on anticoagulation due to recent intracranial hemorrhage.    Continue PTA carvedilol.     History of spontaneous intracranial hemorrhage  Admitted to [facility] in January 2023 due to nontraumatic intraventricular intracerebral hemorrhage which was managed nonoperatively.    Noted.    Patient presented with dizziness, lightheadedness and a new intraventricular hemorrhage.    He was given Kcentra for anticoagulation reversal.    Had reported history of multiple falls prior to admission but no known head trauma that he reported.    4/24 stroke neurology consult: upon further discussion there appears to be potential unresolved or not clarified follow up assessments to his previous stroke/hemorrhage.     Stroke neuro will have a official consult 4/25 to re evaluate where to proceed going forward      Hypertension  Blood pressure fairly well controlled in the ER.    Continue PTA amlodipine and carvedilol.    Hold PTA irbesartan in setting of acute kidney injury    Monitor BP closely.    Anticipate renal function may improve with straight caths..     Anemia of chronic disease  Hemoglobin 11.8 on 4/20/2023.    Recheck in AM.    Hemoglobin 11.8 on last check and  repeat on 4/25     Disposition    Case management involved.    In the past felt unsafe for patient to be at home with even a vulnerable adult report filed.    Interdisciplinary team's at the TCU did not feel he should go home but the family was okay with discharge as they felt the patient requested this    Again concerns about home safety    [facility] 27/30 4/22 patient insisted that he is going home irregardless of any risk.  He is continued to convey this message. Will try to contact the endocrinology clinic and plan for reattachment of his insulin pump. Also trouble shoot if pump having problems.     4/24 for further updates from his endocrinologist who is no longer comfortable continuing the insulin pump for Tc.  Concerns that he is too high risk.  We will try to convert him back over to subcu insulin as he has been on this admission.  Currently on insulin per carb count plus sliding scale.  This would be challenging in a facility.  Attempt to get some type of sliding scale more likely.    Discussion with case management and hopefully patient will qualify for TCU which may be possible.  This would likely be a short-term solution but from a safety perspective give the family time for ongoing assessment of his need    Diet: Moderate Consistent Carb (60 g CHO per Meal) Diet  Pruett Catheter: PRESENT, indication: Retention  DVT Prophylaxis: compression pneumoboots   Code Status: No CPR- Do NOT Intubate       Expected Discharge Date: 04/27/2023,  3:00 PM    Destination: inpatient rehabilitation facility  Discharge Comments: DKA, MANPREET  [facility] 27/30       SMILEY LUIS MD,   Hospitalist Service  [facility]  ______________________________________________________________________    -Data reviewed today: I reviewed all new labs and imaging results over the last 24 hours. I personally reviewed no images or EKG's today.    Physical Exam   Temp: 98.2  F (36.8  C) Temp src: Oral BP: (!) 148/87 Pulse:  50   Resp: 20 SpO2: 96 % O2 Device: None (Room air)    Vitals:    04/20/23 1801   Weight: 72.6 kg (160 lb)   Constitutional: Patient is in no acute distress.  Respiratory: Breath sounds CTA. No increased work of breathing.  Cardiovascular: RRR, no rub or murmur.   GI: Soft, non-tender, non-distended.  Skin: Warm, dry, no rashes or lesions.  Other: 1-2 + edema   Patient has a Pruett catheter in place    Medications     - MEDICATION INSTRUCTIONS -         amLODIPine  5 mg Oral Daily     carvedilol  12.5 mg Oral BID w/meals     finasteride  5 mg Oral Daily     insulin aspart   Subcutaneous Daily with breakfast     insulin aspart   Subcutaneous Daily with lunch     insulin aspart   Subcutaneous Daily with supper     insulin aspart  1-3 Units Subcutaneous TID AC     insulin glargine  10 Units Subcutaneous Daily with supper     irbesartan  150 mg Oral At Bedtime     tamsulosin  0.4 mg Oral Daily       Data   Recent Labs   Lab 04/24/23  1725 04/24/23  1403 04/24/23  1043 04/24/23  0746 04/24/23  0546 04/23/23  2202 04/23/23  1726 04/23/23  0737 04/23/23  0528 04/20/23  2032 04/20/23  1808 04/20/23  1804   WBC  --   --   --   --   --   --   --   --   --   --   --  11.6*   HGB  --   --   --   --   --   --   --   --   --   --   --  11.8*   MCV  --   --   --   --   --   --   --   --   --   --   --  88   PLT  --   --   --   --   --   --   --   --   --   --   --  238   NA  --   --   --   --  142  --  138  --  140   < > 137  --    POTASSIUM  --   --   --   --  3.8  --  3.4  --  3.4   < > 4.5  --    CHLORIDE  --   --   --   --  104  --  101  --  104   < > 96*  --    CO2  --   --   --   --  30*  --  27  --  26   < > 18*  --    BUN  --   --   --   --  8.9  --  11.4  --  14.8   < > 30.5*  --    CR  --   --   --   --  1.18*  --  1.14  --  1.13   < > 1.72*  --    ANIONGAP  --   --   --   --  8  --  10  --  10   < > 23*  --    LLOYD  --   --   --   --  9.2  --  9.1  --  8.8   < > 9.3  --    * 110* 120*   < > 92   < > 226*   < > 135*    < > 547*  --    ALBUMIN  --   --   --   --   --   --   --   --   --   --  4.3  --    PROTTOTAL  --   --   --   --   --   --   --   --   --   --  6.1*  --    BILITOTAL  --   --   --   --   --   --   --   --   --   --  1.1  --    ALKPHOS  --   --   --   --   --   --   --   --   --   --  92  --    ALT  --   --   --   --   --   --   --   --   --   --  6*  --    AST  --   --   --   --   --   --   --   --   --   --  16  --     < > = values in this interval not displayed.       Imaging:  Recent Results (from the past 24 hour(s))   CT Head w/o Contrast    Narrative    CT SCAN OF THE HEAD WITHOUT CONTRAST   4/24/2023 3:58 PM     HISTORY: Interval evaluation.    TECHNIQUE:  Axial images of the head and coronal reformations without  IV contrast material. Radiation dose for this scan was reduced using  automated exposure control, adjustment of the mA and/or kV according  to patient size, or iterative reconstruction technique.    COMPARISON: Head CT 2/11/2023.    FINDINGS: Previous isodense hemorrhage in the right periventricular  white matter also involving the right lateral ventricle is no longer  present. No new intracranial hemorrhage identified. No mass effect or  midline shift. Moderate diffuse parenchymal volume loss. Patchy  periventricular white matter hypodensities are nonspecific, but most  likely related to chronic microvascular ischemic disease. No abnormal  extra axial fluid collection.    The visualized portions of the sinuses and mastoids appear normal. The  bony calvarium and bones of the skull base appear intact.       Impression    IMPRESSION:     1. Previous isodense blood products in the periventricular white  matter and right lateral ventricle from 2/11/2023 are no longer  identified. No new intracranial hemorrhage.   2. Moderate diffuse parenchymal volume loss and white matter changes  most likely due to chronic microvascular ischemic disease.      CLINT CAAL MD         SYSTEM ID:  [id number]

## 2023-04-24 NOTE — DISCHARGE INSTRUCTIONS
- You have a Pruett catheter in place to allow for healing of your urinary system.   - You will need to see your urology doctor to have the drain checked and subsequently removed at your outpatient follow up appointment.   - Do not soak or take a tub bath (no swimming or going in a hot tub) while your catheter remains in place. You may shower with the catheter in. You may use a wet or soapy wash cloth to clean the outside of the catheter and the groin area, just make sure to avoid pulling on the catheter. Always secure the catheter to your thigh to prevent pulling.   - Look at the appearance and amount of drainage that comes from the drain or pouch. Call [facility] office if the drain falls out, the drainage has bright red blood that does not clear within 48 hours, the drainage has a foul smell, the drainage has pus in it or you have concerns about the drainage or any other concerns.   - For male patients: You may apply a small amount of bacitracin/neosporin ointment to the tip of your penis twice daily while your catheter is in place to help reduce irritation.       Follow up with your Primary Doctor  Jessika Cordoba [phone] [address], [place] [address]   Monday May 8th 11:30am.  Please bring this paperwork with to the hospital.

## 2023-04-24 NOTE — PROVIDER NOTIFICATION
MD Notification    Notified Person: MD    Notified Person Name: Ana Jon MD    Notification Date/Time: 4/24/23 1816    Notification Interaction: Kenan    Purpose of Notification:  To see if the Lantus should have been scheduled later than the novalog.          Orders Received:    Comments:

## 2023-04-24 NOTE — PROGRESS NOTES
His endocrinologist does not feel he is safe with his pump or managing his insulin and no longer will be taking care of his DM. Received call from Jessika working with Dr. Mejia. [place] to be unsafe   Dr. Lola Jon

## 2023-04-24 NOTE — PLAN OF CARE
Vital signs stable on room air. Alert and oriented. Lung sounds diminished. Bowel sounds active, flatus present. Patient denies pain this shift. Up assist of one. Tolerating mod carb  diet. CMS/neuros intact. BG more controlled, MD adjusted meal and night time insulin. Continuing frequent checks. Adequate cho output.

## 2023-04-24 NOTE — PROGRESS NOTES
Urology     84 yo gentleman with BPH admitted with DKA. Experiencing urinary retention. Straight cathed several times since admission.     Cho catheter placed 4/23/23.      Patient follows with urologist Dr Olvera. Last seen 1 month ago in clinic at which time finasteride was started due to history of intermittent urinary retention as we are seeing this admission.      Plan:  - Continue Flomax and Finasteride   - Continue cho, would recommend 7-10 days. RN to teach cho cares and give leg bag.   - Okay to use prn bacitracin to tip of penis for discomfort.   - Will need TOV in urology clinic in 1-2 weeks. AVS updated.     Elina Busby PA-C   [facility]  Office Phone: [phone]  After 4pm and on weekends, please call [phone].

## 2023-04-25 ENCOUNTER — APPOINTMENT (OUTPATIENT)
Dept: MRI IMAGING | Facility: CLINIC | Age: 83
DRG: 638 | End: 2023-04-25
Payer: COMMERCIAL

## 2023-04-25 LAB
ANION GAP SERPL CALCULATED.3IONS-SCNC: 8 MMOL/L (ref 7–15)
B-OH-BUTYR SERPL-SCNC: <0.2 MMOL/L
BUN SERPL-MCNC: 8.1 MG/DL (ref 8–23)
CALCIUM SERPL-MCNC: 9.3 MG/DL (ref 8.8–10.2)
CHLORIDE SERPL-SCNC: 105 MMOL/L (ref 98–107)
CREAT SERPL-MCNC: 1.17 MG/DL (ref 0.67–1.17)
DEPRECATED HCO3 PLAS-SCNC: 29 MMOL/L (ref 22–29)
ERYTHROCYTE [DISTWIDTH] IN BLOOD BY AUTOMATED COUNT: 14.9 % (ref 10–15)
GFR SERPL CREATININE-BSD FRML MDRD: 61 ML/MIN/1.73M2
GLUCOSE BLDC GLUCOMTR-MCNC: 101 MG/DL (ref 70–99)
GLUCOSE BLDC GLUCOMTR-MCNC: 271 MG/DL (ref 70–99)
GLUCOSE BLDC GLUCOMTR-MCNC: 273 MG/DL (ref 70–99)
GLUCOSE BLDC GLUCOMTR-MCNC: 306 MG/DL (ref 70–99)
GLUCOSE BLDC GLUCOMTR-MCNC: 50 MG/DL (ref 70–99)
GLUCOSE BLDC GLUCOMTR-MCNC: 77 MG/DL (ref 70–99)
GLUCOSE BLDC GLUCOMTR-MCNC: 93 MG/DL (ref 70–99)
GLUCOSE BLDC GLUCOMTR-MCNC: 97 MG/DL (ref 70–99)
GLUCOSE SERPL-MCNC: 61 MG/DL (ref 70–99)
HCT VFR BLD AUTO: 37.6 % (ref 40–53)
HGB BLD-MCNC: 12.2 G/DL (ref 13.3–17.7)
MCH RBC QN AUTO: 27.7 PG (ref 26.5–33)
MCHC RBC AUTO-ENTMCNC: 32.4 G/DL (ref 31.5–36.5)
MCV RBC AUTO: 86 FL (ref 78–100)
PLATELET # BLD AUTO: 193 10E3/UL (ref 150–450)
POTASSIUM SERPL-SCNC: 3.9 MMOL/L (ref 3.4–5.3)
RBC # BLD AUTO: 4.4 10E6/UL (ref 4.4–5.9)
SODIUM SERPL-SCNC: 142 MMOL/L (ref 136–145)
WBC # BLD AUTO: 7.7 10E3/UL (ref 4–11)

## 2023-04-25 PROCEDURE — 36415 COLL VENOUS BLD VENIPUNCTURE: CPT | Performed by: INTERNAL MEDICINE

## 2023-04-25 PROCEDURE — 99223 1ST HOSP IP/OBS HIGH 75: CPT | Mod: GC | Performed by: PSYCHIATRY & NEUROLOGY

## 2023-04-25 PROCEDURE — 82010 KETONE BODYS QUAN: CPT | Performed by: INTERNAL MEDICINE

## 2023-04-25 PROCEDURE — 85027 COMPLETE CBC AUTOMATED: CPT | Performed by: INTERNAL MEDICINE

## 2023-04-25 PROCEDURE — 70553 MRI BRAIN STEM W/O & W/DYE: CPT

## 2023-04-25 PROCEDURE — 250N000013 HC RX MED GY IP 250 OP 250 PS 637

## 2023-04-25 PROCEDURE — 255N000002 HC RX 255 OP 636: Performed by: INTERNAL MEDICINE

## 2023-04-25 PROCEDURE — 250N000013 HC RX MED GY IP 250 OP 250 PS 637: Performed by: INTERNAL MEDICINE

## 2023-04-25 PROCEDURE — 120N000001 HC R&B MED SURG/OB

## 2023-04-25 PROCEDURE — 250N000011 HC RX IP 250 OP 636: Performed by: INTERNAL MEDICINE

## 2023-04-25 PROCEDURE — 99232 SBSQ HOSP IP/OBS MODERATE 35: CPT | Performed by: INTERNAL MEDICINE

## 2023-04-25 PROCEDURE — A9585 GADOBUTROL INJECTION: HCPCS | Performed by: INTERNAL MEDICINE

## 2023-04-25 PROCEDURE — 250N000013 HC RX MED GY IP 250 OP 250 PS 637: Performed by: HOSPITALIST

## 2023-04-25 PROCEDURE — 80048 BASIC METABOLIC PNL TOTAL CA: CPT | Performed by: INTERNAL MEDICINE

## 2023-04-25 RX ORDER — HYDROCODONE BITARTRATE AND ACETAMINOPHEN 5; 325 MG/1; MG/1
.5-1 TABLET ORAL 2 TIMES DAILY PRN
Status: ON HOLD | COMMUNITY
End: 2023-04-27

## 2023-04-25 RX ORDER — AMLODIPINE BESYLATE 5 MG/1
5 TABLET ORAL DAILY
Status: ON HOLD | COMMUNITY
End: 2023-04-27

## 2023-04-25 RX ORDER — DEXTROSE MONOHYDRATE 25 G/50ML
25-50 INJECTION, SOLUTION INTRAVENOUS
Status: DISCONTINUED | OUTPATIENT
Start: 2023-04-25 | End: 2023-04-25

## 2023-04-25 RX ORDER — ENOXAPARIN SODIUM 100 MG/ML
40 INJECTION SUBCUTANEOUS EVERY 24 HOURS
Status: DISCONTINUED | OUTPATIENT
Start: 2023-04-25 | End: 2023-04-26

## 2023-04-25 RX ORDER — LOVASTATIN 20 MG
20 TABLET ORAL AT BEDTIME
Status: ON HOLD | COMMUNITY
End: 2023-04-27

## 2023-04-25 RX ORDER — FINASTERIDE 5 MG/1
5 TABLET, FILM COATED ORAL DAILY
COMMUNITY
End: 2023-05-26

## 2023-04-25 RX ORDER — NICOTINE POLACRILEX 4 MG
15-30 LOZENGE BUCCAL
Status: DISCONTINUED | OUTPATIENT
Start: 2023-04-25 | End: 2023-04-25

## 2023-04-25 RX ORDER — CYCLOBENZAPRINE HCL 10 MG
10 TABLET ORAL
Status: ON HOLD | COMMUNITY
End: 2023-04-27

## 2023-04-25 RX ORDER — GADOBUTROL 604.72 MG/ML
7 INJECTION INTRAVENOUS ONCE
Status: COMPLETED | OUTPATIENT
Start: 2023-04-25 | End: 2023-04-25

## 2023-04-25 RX ADMIN — FINASTERIDE 5 MG: 5 TABLET, FILM COATED ORAL at 08:32

## 2023-04-25 RX ADMIN — CARVEDILOL 12.5 MG: 12.5 TABLET, FILM COATED ORAL at 08:32

## 2023-04-25 RX ADMIN — IRBESARTAN 150 MG: 150 TABLET ORAL at 22:02

## 2023-04-25 RX ADMIN — AMLODIPINE BESYLATE 5 MG: 5 TABLET ORAL at 08:32

## 2023-04-25 RX ADMIN — GADOBUTROL 7 ML: 604.72 INJECTION INTRAVENOUS at 21:26

## 2023-04-25 RX ADMIN — TAMSULOSIN HYDROCHLORIDE 0.4 MG: 0.4 CAPSULE ORAL at 08:32

## 2023-04-25 RX ADMIN — CARVEDILOL 12.5 MG: 12.5 TABLET, FILM COATED ORAL at 17:46

## 2023-04-25 RX ADMIN — ENOXAPARIN SODIUM 40 MG: 40 INJECTION SUBCUTANEOUS at 12:24

## 2023-04-25 ASSESSMENT — ACTIVITIES OF DAILY LIVING (ADL)
ADLS_ACUITY_SCORE: 31

## 2023-04-25 NOTE — PROGRESS NOTES
Care Management Follow Up    Length of Stay (days): 5    Expected Discharge Date: 04/27/2023     Concerns to be Addressed:       Patient plan of care discussed at interdisciplinary rounds: Yes    Anticipated Discharge Disposition: Home VS LTC      Anticipated Discharge Services:    Anticipated Discharge DME:      Patient/family educated on Medicare website which has current facility and service quality ratings:    Education Provided on the Discharge Plan:    Patient/Family in Agreement with the Plan: yes    Referrals Placed by CM/SW:    Private pay costs discussed: Not applicable    Additional Information:  BERTHA spoke with hospitalist who is concerned with patient being able to manage diabetes on his own. Hospitality indicated she is going to make a few adjustments and monitor patient for a few days to determine if he can manage on his own. BERTHA spoke with daughter Brittani about LTC as a backup if patient would not be able to manage diabetes independently. Daughter understanding and would like to work with [facility] for an MA application. Patient/family goal is to discharge home if safe to do so. BERTHA emailed [facility] requesting that they follow up with daughter for MA application.      JERE Cuello    [facility]

## 2023-04-25 NOTE — PHARMACY-ADMISSION MEDICATION HISTORY
Pharmacist Admission Medication History    Admission medication history is complete. The information provided in this note is only as accurate as the sources available at the time of the update.    Medication reconciliation/reorder completed by provider prior to medication history? Yes    Information Source(s): Patient's pharmacy and CareEverywhere/SureScripts via phone    Pertinent Information: Patient was prescribed Tamsulosin but never picked it up from pharmacy.     Changes made to PTA medication list:    Added: Amlodipine, Lovastatin, Finasteride, Norco 5/325, Flexeril.    Deleted: Tramadol (completed)    Medication Affordability: unable to assess.        Allergies reviewed with patient and updates made in EHR: unable to assess    Medication History Completed By: Andie Bailey [facility] 4/25/2023 9:29 AM    Prior to Admission medications    Medication Sig Last Dose Taking? Auth Provider Long Term End Date   acetaminophen (TYLENOL) 325 MG tablet Take 650 mg by mouth every 4 hours as needed for mild pain or fever Unknown Yes Unknown, Entered By History No    amLODIPine (NORVASC) 5 MG tablet Take 5 mg by mouth daily Unknown Yes Unknown, Entered By History Yes             carvedilol (COREG) 12.5 MG tablet Take 1 tablet (12.5 mg) by mouth 2 times daily (with meals) 4/19/2023 Yes Mario Ponce, NP Yes    cyclobenzaprine (FLEXERIL) 10 MG tablet Take 10 mg by mouth nightly as needed for muscle spasms Unknown Yes Unknown, Entered By History No    finasteride (PROSCAR) 5 MG tablet Take 5 mg by mouth daily Unknown Yes Unknown, Entered By History              glucagon 1 MG kit 1 mg as needed for low blood sugar Unknown Yes Unknown, Entered By History     HUMALOG 100 UNIT/ML injection For refilling insulin pump Unknown Yes Unknown, Entered By History No    HYDROcodone-acetaminophen (NORCO) 5-325 MG tablet Take 0.5-1 tablets by mouth 2 times daily as needed for severe pain Unknown Yes Unknown, Entered By History No     insulin glargine (LANTUS PEN) 100 UNIT/ML pen Inject 10 Units Subcutaneous At Bedtime Unknown Yes Haase, Tonya Lynn, APRN CNP Yes    INSULIN PUMP - OUTPATIENT [employer] device with no CGM and site change every 3 days   Sensitivity factor (midnight to midnight): 55  Bolus (units:gram): 1927-6028 = 1:9, 9500-8158 = 1:7, 6947-6216 = 1:7, 8070-0860 = 1:9, 1348-4368 = 1:13  Basal (units/hour): 7871-5481 = 0.45, 0343-4687 = 0.5, 2085-4813 = 0.625, 3802-5057 = 0.6, 6744-8377 = 0.45 Unknown Yes Unknown, Entered By History     irbesartan (AVAPRO) 150 MG tablet Take 1 tablet (150 mg) by mouth At Bedtime 4/19/2023 Yes Mario Ponce, NP Yes    lovastatin (MEVACOR) 20 MG tablet Take 20 mg by mouth At Bedtime Unknown Yes Unknown, Entered By History No

## 2023-04-25 NOTE — PLAN OF CARE
"Goal Outcome Evaluation:    A/O to self, intermittently to place.  States he's \"[facility] near the [facility]\" and that he is in the hospital because he \"had a stroke.\"  VSS, LS clear. Denies pain.   @ HS, 101 @ 0330.  Rested overnight, appeared sleeping.  Ax1.  Pruett in place w/ adequate UOP.  Will discharge with catheter in place and follow up with Urology for removal and TOV. Awaiting safe discharge plan, pending demonstration of understanding insulin dosing and administration.     Update:  BG 61 with BMP this am.  Pt is asymptomatic.  Pt receiving apple juice and ordering breakfast at this time, following RN will recheck.                      "

## 2023-04-25 NOTE — PLAN OF CARE
Goal Outcome Evaluation:  Alert and oriented x 4. Assist of 1gbw. Pruett catheter in place. Denies pain.

## 2023-04-25 NOTE — PLAN OF CARE
Goal Outcome Evaluation:       A&O x's 4, pt knew where he was. VSS on R/A. Lungs sounds - Clear. Bowel Sounds - normoactive, no BM today. Urine Output - adequate with cho cath. Ambulation - assist of one w/gbw. Diet - mod carb diet, 60 g. Pain controlled by - pain denied. Discharge pending where he will go and if he can manage BS's.

## 2023-04-25 NOTE — PLAN OF CARE
Goal Outcome Evaluation:       A&O x's 4. VSS on R/A Lungs sounds - Diminished. Bowel Sounds - normoactive, no BM during the shift. Urine Output - adequate output in cho. Ambulation - Assist of 1 with gait belt. Diet - Mod carb, carb count for BS monitoring.  Pain controlled by - Denied pain during shift. Providers trying to get BS's under control. Endocrinology will be getting involved.

## 2023-04-25 NOTE — PROGRESS NOTES
"[facility]  Hospitalist Progress Note    Date of Admission: 4/20/2023    Interval History   Patient is trying to demonstrate his ability to manage insulin.   Rediscussed the episode that he had prior to coming in with his insulin pump.  He states he was told to give himself subcu insulin but he did not have a needle at home or would otherwise been able to drop the insulin.  He is going to try to contact his endocrine clinic today  What appear if he truly had 3 weeks of failure for control of the pump may be work for him better  In addition he will be seen by the stroke team    Assessment & Plan   Tc Garcia is a 83 year old male with a history of diabetes mellitus type 2, paroxysmal atrial fibrillation, hypertension, hyperlipidemia, pulmonary hypertension, spontaneous intracranial hemorrhage, recurrent pleural effusion, chronic kidney disease stage III, and anemia of chronic disease who presented to the ER with elevated blood sugars.  In the ER he was found to have diabetic ketoacidosis.  He was given IV fluids, potassium, and IV insulin.  The hospitalist service was contacted to admit him for further evaluation management.     Diabetic ketoacidosis  Diabetes mellitus type 1  Brittle/labile blood sugars   Hemoglobin A1c 8.0 on 4/20/2023.  He has an insulin pump at home and has been using it, denies any interruptions or errors.  No recent changes in his diet.  No obvious inciting factor identified thus far.  In the ER he had a glucose of 547, ketones of 3.8, bicarb 18, anion gap 23.    Admit to inpatient.    4/20 admitted under the DKA protocol>> stopped on 4/21    Insulin pump is on hold for now.>  While in the hospital given lability holding on his insulin pump    Prior concerns in Epic in 2/2023 while at [facility] that his DM was brittle. In addition reported cognitive impairment. IDT did not recommend patient return to home and needed a high level of care. The family declined. >> \"dad will " "not agree to move to [facility]\" \"he does what he wants to do\" [facility] report filed at the TCU     Chest x-ray with no new changes.  UA negative and urine culture negative.  No sign of infection.    Given the lability and brittle nature of his diabetes suspect ongoing challenges to diabetes control    4/24 called primary endocrinology clinic.  His primary endocrinologist is concerned about the patient continuing on his insulin pump and wants to stop it.    In agreement the patient should likely be in a different living situation. HE has very brittle DM. At risk of extreme glucose changes and hypoglycemia.    4/24 nursing staff will assess patient's ability to manage subcu insulin which suspect will be very challenging.    4/25 will reduce Lantus to 8 units.  Stop carb counts with sliding scale to simplify his subcu regimen:     Read discussed prior to admission which she states he could have been able to drop subcu insulin but did not have a needle.  He feels that after starting his insulin pump his blood sugars have been the best controlled in quite some time     Acute kidney injury  Chronic kidney disease, stage III    Baseline creatinine appears to be around 1.2.  Creatinine up to 1.72 in the ER.    Patient received IV fluids.  His creatinine is elevated.    Known history of retention again noted this admission    Creatinine 1.62 but high PVR with straight cath required    May be post obstructive rather than all dehydration    Creatinine 1.17     Lower extremity edema  Dyspnea on exertion  Has developed lower extremity edema and dyspnea on exertion over the last several days.  No prior history of congestive heart failure.    Echo with EF of 60 to 65%.  Right ventricle moderately dilated.  Moderately severe 3+ tricuspid regurgitation.  Moderate to severe pulmonary hypertension. (pulmonary hypertension on prior ECHO 2021)     4/22 venous dopplers negative of legs.      Urinary retention    Noted on prior hospital to " stay.    Patient required intermittent straight cathing in the past.    Started on Flomax in January 2023    This admission noted again to have urinary retention requiring straight cath.  May be contributing to increase renal function.    Monitor renal function as post obstruction is removed    4/23 Pruett catheter placed with recurrent retention >> requested urology consult    Suspect he will need an indwelling Pruett catheter.      Paroxysmal atrial fibrillation    Not on anticoagulation due to recent intracranial hemorrhage.    Continue PTA carvedilol.     History of spontaneous intracranial hemorrhage  Admitted to [facility] in January 2023 due to nontraumatic intraventricular intracerebral hemorrhage which was managed nonoperatively.    Noted.    Patient presented with dizziness, lightheadedness and a new intraventricular hemorrhage.    He was given Kcentra for anticoagulation reversal.    Had reported history of multiple falls prior to admission but no known head trauma that he reported.    4/24 stroke neurology consult: upon further discussion there appears to be potential unresolved or not clarified follow up assessments to his previous stroke/hemorrhage.     Stroke neuro will have a official consult 4/25 to re evaluate where to proceed going forward      Hypertension  Blood pressure fairly well controlled in the ER.    Continue PTA amlodipine and carvedilol.    Hold PTA irbesartan in setting of acute kidney injury    Monitor BP closely.    Anticipate renal function may improve with straight caths..     Anemia of chronic disease  Hemoglobin 11.8 on 4/20/2023.    Recheck in AM.    Hemoglobin 11.8 on last check and repeat on 4/25     Disposition    Case management involved.    In the past felt unsafe for patient to be at home with even a vulnerable adult report filed.    Interdisciplinary team's at the TCU did not feel he should go home but the family was okay with discharge as they felt the patient  requested this    Again concerns about home safety    [facility] 27/30 4/22 patient insisted that he is going home irregardless of any risk.  He is continued to convey this message. Will try to contact the endocrinology clinic and plan for reattachment of his insulin pump. Also trouble shoot if pump having problems.     4/24 for further updates from his endocrinologist who is no longer comfortable continuing the insulin pump for Tc.  Concerns that he is too high risk.  We will try to convert him back over to subcu insulin as he has been on this admission.  Currently on insulin per carb count plus sliding scale.  This would be challenging in a facility.  Attempt to get some type of sliding scale more likely.    4/24 initial thoughts that patient qualified for TCU    4/25 did not meet criteria for TCU.  Going back to reevaluate his diabetes control after dialogue this morning.    Diet: Moderate Consistent Carb (60 g CHO per Meal) Diet  Snacks/Supplements Adult: Other; snack at 10 pm; Between Meals  Pruett Catheter: PRESENT, indication: Retention  DVT Prophylaxis: compression pneumoboots   Code Status: No CPR- Do NOT Intubate       Expected Discharge Date: 04/26/2023,  3:00 PM    Destination: inpatient rehabilitation facility  Discharge Comments: DKA, MANPREET  [facility] 27/30       SMILEY LUIS MD,   Hospitalist Service  [facility]  ______________________________________________________________________    -Data reviewed today: I reviewed all new labs and imaging results over the last 24 hours. I personally reviewed no images or EKG's today.    Physical Exam   Temp: 97.8  F (36.6  C) Temp src: Oral BP: 133/76 Pulse: 61   Resp: 16 SpO2: 93 % O2 Device: None (Room air)    Vitals:    04/20/23 1801   Weight: 72.6 kg (160 lb)   Constitutional: Patient is in no acute distress.  Respiratory: Breath sounds CTA. No increased work of breathing.  Cardiovascular: RRR, no rub or murmur.   GI: Soft, non-tender,  non-distended.  Skin: Warm, dry, no rashes or lesions.  Other: 1-2 + edema   Patient has a Pruett catheter in place    Medications     - MEDICATION INSTRUCTIONS -         amLODIPine  5 mg Oral Daily     carvedilol  12.5 mg Oral BID w/meals     enoxaparin ANTICOAGULANT  40 mg Subcutaneous Q24H     finasteride  5 mg Oral Daily     insulin aspart  1-7 Units Subcutaneous TID AC     [Held by provider] insulin aspart   Subcutaneous Daily with breakfast     [Held by provider] insulin aspart   Subcutaneous Daily with lunch     [Held by provider] insulin aspart   Subcutaneous Daily with supper     insulin glargine  8 Units Subcutaneous Daily with supper     irbesartan  150 mg Oral At Bedtime     tamsulosin  0.4 mg Oral Daily       Data   Recent Labs   Lab 04/25/23  1150 04/25/23  1013 04/25/23  0802 04/25/23  0732 04/25/23  0537 04/24/23 2012 04/24/23  1751 04/24/23  0746 04/24/23  0546 04/20/23  2032 04/20/23  1808 04/20/23  1804   WBC  --   --   --   --  7.7  --   --   --   --   --   --  11.6*   HGB  --   --   --   --  12.2*  --   --   --   --   --   --  11.8*   MCV  --   --   --   --  86  --   --   --   --   --   --  88   PLT  --   --   --   --  193  --   --   --   --   --   --  238   NA  --   --   --   --  142  --  138  --  142   < > 137  --    POTASSIUM  --   --   --   --  3.9  --  3.7  --  3.8   < > 4.5  --    CHLORIDE  --   --   --   --  105  --  100  --  104   < > 96*  --    CO2  --   --   --   --  29  --  28  --  30*   < > 18*  --    BUN  --   --   --   --  8.1  --  8.1  --  8.9   < > 30.5*  --    CR  --   --   --   --  1.17  --  1.08  --  1.18*   < > 1.72*  --    ANIONGAP  --   --   --   --  8  --  10  --  8   < > 23*  --    LLOYD  --   --   --   --  9.3  --  9.1  --  9.2   < > 9.3  --    GLC 93 97 77   < > 61*   < > 236*   < > 92   < > 547*  --    ALBUMIN  --   --   --   --   --   --   --   --   --   --  4.3  --    PROTTOTAL  --   --   --   --   --   --   --   --   --   --  6.1*  --    BILITOTAL  --   --   --   --    --   --   --   --   --   --  1.1  --    ALKPHOS  --   --   --   --   --   --   --   --   --   --  92  --    ALT  --   --   --   --   --   --   --   --   --   --  6*  --    AST  --   --   --   --   --   --   --   --   --   --  16  --     < > = values in this interval not displayed.       Imaging:  Recent Results (from the past 24 hour(s))   CT Head w/o Contrast    Narrative    CT SCAN OF THE HEAD WITHOUT CONTRAST   4/24/2023 3:58 PM     HISTORY: Interval evaluation.    TECHNIQUE:  Axial images of the head and coronal reformations without  IV contrast material. Radiation dose for this scan was reduced using  automated exposure control, adjustment of the mA and/or kV according  to patient size, or iterative reconstruction technique.    COMPARISON: Head CT 2/11/2023.    FINDINGS: Previous isodense hemorrhage in the right periventricular  white matter also involving the right lateral ventricle is no longer  present. No new intracranial hemorrhage identified. No mass effect or  midline shift. Moderate diffuse parenchymal volume loss. Patchy  periventricular white matter hypodensities are nonspecific, but most  likely related to chronic microvascular ischemic disease. No abnormal  extra axial fluid collection.    The visualized portions of the sinuses and mastoids appear normal. The  bony calvarium and bones of the skull base appear intact.       Impression    IMPRESSION:     1. Previous isodense blood products in the periventricular white  matter and right lateral ventricle from 2/11/2023 are no longer  identified. No new intracranial hemorrhage.   2. Moderate diffuse parenchymal volume loss and white matter changes  most likely due to chronic microvascular ischemic disease.      CLINT CAAL MD         SYSTEM ID:  [id number]

## 2023-04-26 ENCOUNTER — APPOINTMENT (OUTPATIENT)
Dept: PHYSICAL THERAPY | Facility: CLINIC | Age: 83
DRG: 638 | End: 2023-04-26
Payer: COMMERCIAL

## 2023-04-26 ENCOUNTER — APPOINTMENT (OUTPATIENT)
Dept: OCCUPATIONAL THERAPY | Facility: CLINIC | Age: 83
DRG: 638 | End: 2023-04-26
Payer: COMMERCIAL

## 2023-04-26 LAB
ANION GAP SERPL CALCULATED.3IONS-SCNC: 15 MMOL/L (ref 7–15)
ANION GAP SERPL CALCULATED.3IONS-SCNC: 8 MMOL/L (ref 7–15)
BUN SERPL-MCNC: 11.6 MG/DL (ref 8–23)
BUN SERPL-MCNC: 14.6 MG/DL (ref 8–23)
CALCIUM SERPL-MCNC: 9 MG/DL (ref 8.8–10.2)
CALCIUM SERPL-MCNC: 9.2 MG/DL (ref 8.8–10.2)
CHLORIDE SERPL-SCNC: 96 MMOL/L (ref 98–107)
CHLORIDE SERPL-SCNC: 98 MMOL/L (ref 98–107)
CREAT SERPL-MCNC: 1.14 MG/DL (ref 0.67–1.17)
CREAT SERPL-MCNC: 1.16 MG/DL (ref 0.67–1.17)
D DIMER PPP FEU-MCNC: 0.61 UG/ML FEU (ref 0–0.5)
DEPRECATED HCO3 PLAS-SCNC: 26 MMOL/L (ref 22–29)
DEPRECATED HCO3 PLAS-SCNC: 30 MMOL/L (ref 22–29)
ERYTHROCYTE [DISTWIDTH] IN BLOOD BY AUTOMATED COUNT: 15 % (ref 10–15)
GFR SERPL CREATININE-BSD FRML MDRD: 62 ML/MIN/1.73M2
GFR SERPL CREATININE-BSD FRML MDRD: 63 ML/MIN/1.73M2
GLUCOSE BLDC GLUCOMTR-MCNC: 151 MG/DL (ref 70–99)
GLUCOSE BLDC GLUCOMTR-MCNC: 191 MG/DL (ref 70–99)
GLUCOSE BLDC GLUCOMTR-MCNC: 287 MG/DL (ref 70–99)
GLUCOSE BLDC GLUCOMTR-MCNC: 305 MG/DL (ref 70–99)
GLUCOSE BLDC GLUCOMTR-MCNC: 360 MG/DL (ref 70–99)
GLUCOSE SERPL-MCNC: 347 MG/DL (ref 70–99)
GLUCOSE SERPL-MCNC: 426 MG/DL (ref 70–99)
HCT VFR BLD AUTO: 38.2 % (ref 40–53)
HGB BLD-MCNC: 12.3 G/DL (ref 13.3–17.7)
MCH RBC QN AUTO: 27.4 PG (ref 26.5–33)
MCHC RBC AUTO-ENTMCNC: 32.2 G/DL (ref 31.5–36.5)
MCV RBC AUTO: 85 FL (ref 78–100)
PLATELET # BLD AUTO: 215 10E3/UL (ref 150–450)
POTASSIUM SERPL-SCNC: 3.9 MMOL/L (ref 3.4–5.3)
POTASSIUM SERPL-SCNC: 4 MMOL/L (ref 3.4–5.3)
RBC # BLD AUTO: 4.49 10E6/UL (ref 4.4–5.9)
SODIUM SERPL-SCNC: 136 MMOL/L (ref 136–145)
SODIUM SERPL-SCNC: 137 MMOL/L (ref 136–145)
SODIUM SERPL-SCNC: 137 MMOL/L (ref 136–145)
TROPONIN T SERPL HS-MCNC: 24 NG/L
WBC # BLD AUTO: 8.7 10E3/UL (ref 4–11)

## 2023-04-26 PROCEDURE — 99233 SBSQ HOSP IP/OBS HIGH 50: CPT | Performed by: INTERNAL MEDICINE

## 2023-04-26 PROCEDURE — 99207 PR NO BILLABLE SERVICE THIS VISIT: CPT | Performed by: INTERNAL MEDICINE

## 2023-04-26 PROCEDURE — 250N000013 HC RX MED GY IP 250 OP 250 PS 637: Performed by: INTERNAL MEDICINE

## 2023-04-26 PROCEDURE — 97535 SELF CARE MNGMENT TRAINING: CPT | Mod: GO | Performed by: OCCUPATIONAL THERAPIST

## 2023-04-26 PROCEDURE — 97530 THERAPEUTIC ACTIVITIES: CPT | Mod: GP

## 2023-04-26 PROCEDURE — 36415 COLL VENOUS BLD VENIPUNCTURE: CPT | Performed by: INTERNAL MEDICINE

## 2023-04-26 PROCEDURE — 258N000002 HC RX IP 258 OP 250: Performed by: INTERNAL MEDICINE

## 2023-04-26 PROCEDURE — 99232 SBSQ HOSP IP/OBS MODERATE 35: CPT | Mod: GC | Performed by: PSYCHIATRY & NEUROLOGY

## 2023-04-26 PROCEDURE — 250N000013 HC RX MED GY IP 250 OP 250 PS 637

## 2023-04-26 PROCEDURE — 97530 THERAPEUTIC ACTIVITIES: CPT | Mod: GO | Performed by: OCCUPATIONAL THERAPIST

## 2023-04-26 PROCEDURE — 97116 GAIT TRAINING THERAPY: CPT | Mod: GP

## 2023-04-26 PROCEDURE — 250N000013 HC RX MED GY IP 250 OP 250 PS 637: Performed by: HOSPITALIST

## 2023-04-26 PROCEDURE — 250N000011 HC RX IP 250 OP 636: Performed by: INTERNAL MEDICINE

## 2023-04-26 PROCEDURE — 84295 ASSAY OF SERUM SODIUM: CPT | Performed by: INTERNAL MEDICINE

## 2023-04-26 PROCEDURE — 85379 FIBRIN DEGRADATION QUANT: CPT | Performed by: INTERNAL MEDICINE

## 2023-04-26 PROCEDURE — 85027 COMPLETE CBC AUTOMATED: CPT | Performed by: INTERNAL MEDICINE

## 2023-04-26 PROCEDURE — 84484 ASSAY OF TROPONIN QUANT: CPT | Performed by: INTERNAL MEDICINE

## 2023-04-26 PROCEDURE — 120N000001 HC R&B MED SURG/OB

## 2023-04-26 RX ORDER — SODIUM CHLORIDE 450 MG/100ML
INJECTION, SOLUTION INTRAVENOUS CONTINUOUS
Status: DISCONTINUED | OUTPATIENT
Start: 2023-04-26 | End: 2023-04-27 | Stop reason: ALTCHOICE

## 2023-04-26 RX ADMIN — AMLODIPINE BESYLATE 5 MG: 5 TABLET ORAL at 09:26

## 2023-04-26 RX ADMIN — FINASTERIDE 5 MG: 5 TABLET, FILM COATED ORAL at 09:26

## 2023-04-26 RX ADMIN — CARVEDILOL 12.5 MG: 12.5 TABLET, FILM COATED ORAL at 09:25

## 2023-04-26 RX ADMIN — ENOXAPARIN SODIUM 40 MG: 40 INJECTION SUBCUTANEOUS at 12:11

## 2023-04-26 RX ADMIN — IRBESARTAN 150 MG: 150 TABLET ORAL at 21:44

## 2023-04-26 RX ADMIN — TAMSULOSIN HYDROCHLORIDE 0.4 MG: 0.4 CAPSULE ORAL at 09:25

## 2023-04-26 RX ADMIN — SODIUM CHLORIDE: 4.5 INJECTION, SOLUTION INTRAVENOUS at 22:53

## 2023-04-26 RX ADMIN — CARVEDILOL 12.5 MG: 12.5 TABLET, FILM COATED ORAL at 17:06

## 2023-04-26 ASSESSMENT — ACTIVITIES OF DAILY LIVING (ADL)
ADLS_ACUITY_SCORE: 31
ADLS_ACUITY_SCORE: 30
ADLS_ACUITY_SCORE: 31
ADLS_ACUITY_SCORE: 31
ADLS_ACUITY_SCORE: 30
ADLS_ACUITY_SCORE: 31

## 2023-04-26 NOTE — PLAN OF CARE
Goal Outcome Evaluation:      Plan of Care Reviewed With: patient      A/Ox4, forgetful at times, pleasant. Up with ax1, GB, walker. BG corrected with sliding scale insulin, pt self administered lunch insulin with guidance. Neuro intact, denies pain. Plan for discharge either home or TCU.

## 2023-04-26 NOTE — PLAN OF CARE
Resumed care from [id number]. A&O, VSS on room air. Lung sounds clear, Bowel sounds active, cho in place for retention w/ adequate urine output, Ambulates Ax1. Tolerating mod carb diet. Blood glucose 360 coverage given. Denies pain. Discharge pending placement vs. Home. SW/CC following.

## 2023-04-26 NOTE — PLAN OF CARE
Goal Outcome Evaluation:         Pt. A&O x4,forgetful at times. Stable vitals on RA ex slightly elevated Sbp at times. Denies pain. Clear lung sounds. Active bowel sounds, no BM, passing flatus. Pruett cath in place, adequate urine output. Mod cons carb diet. BG checks. PIV, EDITA, SL. Edematous BLE. Ax1 w/gbw. MRI done w/-ve findings. Well rested overnight. Plan- discharge LTC pending.

## 2023-04-26 NOTE — PROGRESS NOTES
Dynamic Gait Index (DGI):The DGI is a measure of balance during gait that is reliable and valid for the elderly and individuals with Parkinson's disease, MS, vestibular disorders, or s/p stroke. Gait assistive device used: None    Scores ?19 /24 indicate an increased risk for falls according to Malika et al 2000  Minimal Detectable Change = 2.9 in community dwelling elderly according to Nathaniel et al 2011    Assessment (rationale for performing, application to patient s function & care plan): to assess fall risk and to assist with discharge recommendations.   (Minutes billed included in gait charge): 10 min

## 2023-04-26 NOTE — PROGRESS NOTES
Notified provider about indwelling cho catheter discussed removal or continued need.    Did provider choose to remove indwelling cho catheter? No    Provider's cho indication for keeping indwelling cho catheter: Retention.    Is there an order for indwelling cho catheter? Yes    *If there is a plan to keep cho catheter in place at discharge daily notification with provider is not necessary, but please add a notation in the treatment team sticky note that the patient will be discharging with the catheter.

## 2023-04-26 NOTE — CONSULTS
Patient has Medicare Advantage through [employer]    Eliquis/Xarelto  --Upon receipt of RX, Discharge Pharmacy can provide 1 mo free.  --Subsequent fills will be $47/mo  --lf/when total drug costs exceed $4,660, price will increase to a 25% coinsurance, equivalent to $134/mo.    Alesha Henry  Pharmacy Technician/Liaison, Discharge Pharmacy   [phone] (voice or text)  [email or web address]

## 2023-04-26 NOTE — CONSULTS
[facility]    Stroke Consult Note    Reason for Consult:  AC timing in setting of previous ICH    Chief Complaint: Hyperglycemia       HPI  Tc Garcia is a 82 YO M w/vascular RFs: afib with previously associated strokes (last 1/15) not currently on AC 2/2 to ICH (IVH) in 1/15/2023, HTN on meds, HLD on statin, IDDM (last A1c 8) who is admitted on 4/20 for DKA.     Stroke Neuro consulted on 4/24 for questions regarding APT/AC resumption.     Of note, pt is under consideration from stroke neuro for ASPIRE trial following hospitalization for IVH in 1/2023 as above. Ischemic embolic strokes from afib also noted on imaging. Due to questions in regards to frequent falls, prior hemorrhage, and some miscommunication with his outpt Cardiology and stroke neuro/stroke research teams - unfortunately pt was not re-started on either AC or APT in a timely manner as outpt and dropped out of loop.     There had also been ongoing discussions as to whether LA appendage closure/occlusion device with Watchman was more appropriate for him as well.     Stroke Evaluation Summarized    MRI/Head CT NCCT  1. Previous isodense blood products in the periventricular white  matter and right lateral ventricle from 2/11/2023 are no longer  identified. No new intracranial hemorrhage.   2. Moderate diffuse parenchymal volume loss and white matter changes  most likely due to chronic microvascular ischemic disease.   Intracranial Vasculature NA   Cervical Vasculature NA     Echocardiogram TTE  The visual ejection fraction is 60-65%.  Left ventricular systolic function is normal.  The right ventricle is moderately dilated.  There is moderately severe (3+) tricuspid regurgitation.  Right ventricular systolic pressure is elevated, consistent with moderate to  severe pulmonary hypertension.  Mild valvular aortic stenosis.  The ascending aorta is Mildly dilated.  Trivial pericardial effusion  The rhythm was atrial  "fibrillation.    The left atrium is severely dilated.   EKG/Telemetry Atrial fibrillation    Other Testing Not Applicable      LDL  No lab value available in past 30 days   A1C  4/20/2023: 8.0 %   Troponin No lab value available in past 48 hrs       Impression/Recommendations  # Chronic IVH, chronic ischemic strokes in setting of afib/AC in 1/15/2023. NCCT with resolved IVH blood products, no acute ICH.   - Ordered MRI brain w/wo to further eval for macrovascular/neoplastic etiologies  - Discussed and counseled pt on risks and benefits of APT or AC for secondary stroke prevention and risk of recurrent ICH  - From neuro perspective, no concerns for pharmacological DVT PPx at this point   - Once final decision on AC/APT is made, in conjunction with shared decision-making with patient, will communicate final plan with his outpt Cardiology provider who appears amenable to resuming AC   - Further watchman discussions can be made as outpt if clinically indicated   - Stroke Neuro will cont to follow     Patient Follow-up    - final recommendation pending work-up    Thank you for this consult. We will continue to follow.     The Stroke Staff is Dr. Li.    Beau Baron MD  Vascular Neurology Fellow    To page me or covering stroke neurology team member, click here: AMCOM  Choose \"On Call\" tab at top, then select \"NEUROLOGY/ALL SITES\" from middle drop-down box, press Enter, then look for \"stroke\" or \"telestroke\" for your site.    _____________________________________________________    Clinically Significant Risk Factors                       # DMII: A1C = 8.0 % (Ref range: <5.7 %) within past 6 months             Past Medical History   Past Medical History:   Diagnosis Date     Anemia      Anemia of chronic disease 5/14/2020     Back pain      CKD (chronic kidney disease) stage 3, GFR 30-59 ml/min (H)      CRF (chronic renal failure), stage 3  5/14/2020     Fall 11/2019    suffered multiple left rib fractures, C3 and " T2 fractures     Mixed hyperlipidemia 7/7/2004     Paroxysmal atrial fibrillation (H)      Personal history of colonic polyps 1972    gets colonoscopy every five years, due in 2006     Pleural effusion on left 11/2019    after multiple rib fractures     Pulmonary hypertension (H) 5/10/2020    Added automatically from request for surgery [id number]     Recurrent Left Pleural effusion -- S/P Pleurex Cath 5/12/20 12/30/2019     Rosacea 1989     Type II or unspecified type diabetes mellitus without mention of complication, not stated as uncontrolled 1999     Unspecified essential hypertension 1994     Past Surgical History   Past Surgical History:   Procedure Laterality Date     ANESTHESIA CARDIOVERSION N/A 2/3/2020    Procedure: ANESTHESIA, FOR CARDIOVERSION;  Surgeon: GENERIC ANESTHESIA PROVIDER;  Location:  OR      RIGHT HEART CATH MEASUREMENTS RECORDED N/A 5/13/2020    Procedure: Right Heart Cath;  Surgeon: Senthil Silva MD;  Location:  HEART CARDIAC CATH LAB     HC REMOVE TONSILS/ADENOIDS,<13 Y/O      T & A <11y.o.     IR CHEST TUBE DRAIN TUNNELED LEFT  5/12/2020     IR CHEST TUBE PLACEMENT NON-TUNNELLED LEFT  7/11/2020     IR CHEST TUBE REMOVAL NON TUNNELED LEFT  7/17/2020     IR CHEST TUBE REMOVAL TUNNELED LEFT  7/11/2020     LAPAROSCOPIC CHOLECYSTECTOMY N/A 11/22/2020    Procedure: CHOLECYSTECTOMY, LAPAROSCOPIC;  Surgeon: Annette Lambert MD;  Location:  OR     LAPAROSCOPIC HERNIORRHAPHY INGUINAL BILATERAL Bilateral 10/27/2020    Procedure: LAPAROSCOPIC BILATERAL INGUINAL HERNIA REPAIR WITH MESH;  Surgeon: Brian Garsia MD;  Location:  OR     Medications   Home Meds  Prior to Admission medications    Medication Sig Start Date End Date Taking? Authorizing Provider   acetaminophen (TYLENOL) 325 MG tablet Take 650 mg by mouth every 4 hours as needed for mild pain or fever   Yes Unknown, Entered By History   amLODIPine (NORVASC) 5 MG tablet Take 5 mg by mouth daily   Yes Unknown, Entered  By History   amLODIPine (NORVASC) 5 MG tablet Take 1 tablet (5 mg) by mouth daily 4/25/23  Yes Ana Jon MD   carvedilol (COREG) 12.5 MG tablet Take 1 tablet (12.5 mg) by mouth 2 times daily (with meals) 3/7/23  Yes Mario Ponce NP   cyclobenzaprine (FLEXERIL) 10 MG tablet Take 10 mg by mouth nightly as needed for muscle spasms   Yes Unknown, Entered By History   finasteride (PROSCAR) 5 MG tablet Take 5 mg by mouth daily   Yes Unknown, Entered By History   finasteride (PROSCAR) 5 MG tablet Take 1 tablet (5 mg) by mouth daily 4/25/23  Yes Ana Jon MD   glucagon 1 MG kit 1 mg as needed for low blood sugar   Yes Unknown, Entered By History   HUMALOG 100 UNIT/ML injection For refilling insulin pump 3/27/23  Yes Unknown, Entered By History   HYDROcodone-acetaminophen (NORCO) 5-325 MG tablet Take 0.5-1 tablets by mouth 2 times daily as needed for severe pain   Yes Unknown, Entered By History   insulin glargine (LANTUS PEN) 100 UNIT/ML pen Inject 10 Units Subcutaneous At Bedtime 2/17/23  Yes Haase, Tonya Lynn, APRN CNP   INSULIN PUMP - OUTPATIENT Med[employer]onic device with no CGM and site change every 3 days   Sensitivity factor (midnight to midnight): 55  Bolus (units:gram): 3662-8159 = 1:9, 9490-3287 = 1:7, 3452-9282 = 1:7, 0633-2283 = 1:9, 1690-6380 = 1:13  Basal (units/hour): 7776-8785 = 0.45, 0975-9099 = 0.5, 1256-3886 = 0.625, 8053-7088 = 0.6, 9686-8960 = 0.45   Yes Unknown, Entered By History   irbesartan (AVAPRO) 150 MG tablet Take 1 tablet (150 mg) by mouth At Bedtime 3/7/23  Yes Mario Ponce NP   lovastatin (MEVACOR) 20 MG tablet Take 20 mg by mouth At Bedtime   Yes Unknown, Entered By History   tamsulosin (FLOMAX) 0.4 MG capsule Take 1 capsule (0.4 mg) by mouth daily 4/25/23  Yes Ana Jon MD       Scheduled Meds    amLODIPine  5 mg Oral Daily     carvedilol  12.5 mg Oral BID w/meals     enoxaparin ANTICOAGULANT  40 mg Subcutaneous Q24H     finasteride  5 mg Oral Daily     insulin  aspart  1-7 Units Subcutaneous TID AC     [Held by provider] insulin aspart   Subcutaneous Daily with breakfast     [Held by provider] insulin aspart   Subcutaneous Daily with lunch     [Held by provider] insulin aspart   Subcutaneous Daily with supper     insulin glargine  8 Units Subcutaneous Daily with supper     irbesartan  150 mg Oral At Bedtime     tamsulosin  0.4 mg Oral Daily       Infusion Meds    - MEDICATION INSTRUCTIONS -         PRN Meds  acetaminophen **OR** acetaminophen, glucose **OR** dextrose **OR** glucagon, melatonin, nitroGLYcerin, ondansetron **OR** ondansetron, - MEDICATION INSTRUCTIONS -, prochlorperazine **OR** prochlorperazine **OR** prochlorperazine, sodium chloride (PF)    Allergies   Allergies   Allergen Reactions     No Known Drug Allergy      Family History   Family History   Problem Relation Age of Onset     Family History Negative Mother          age 71     Family History Negative Father          age 70     Diabetes Brother         alive age 77     Diabetes Brother         alive age 76     Family History Negative Brother              Family History Negative Brother              Diabetes Sister         alive age74     Family History Negative Sister          age 86     Heart Disease Sister              Family History Negative Sister              Family History Negative Sister              Diabetes Sister      Diabetes Sister      Diabetes Brother      Diabetes Brother      Social History   Social History     Tobacco Use     Smoking status: Former     Packs/day: 0.20     Years: 40.00     Pack years: 8.00     Types: Cigarettes     Start date:      Quit date: 2008     Years since quitting: 15.3     Smokeless tobacco: Never   Vaping Use     Vaping status: Never Used   Substance Use Topics     Alcohol use: Not Currently     Alcohol/week: 35.0 standard drinks of alcohol     Drug use: Not Currently       Review of Systems    The 10 point Review of Systems is negative other than noted in the HPI or here.        PHYSICAL EXAMINATION   Temp:  [97.8  F (36.6  C)-98.3  F (36.8  C)] 98.1  F (36.7  C)  Pulse:  [58-70] 62  Resp:  [16-18] 18  BP: (133-152)/(76-91) 152/87  SpO2:  [93 %-97 %] 96 %    Neuro Exam  MS: AO to person, month, year, FSH. Fluent speech, comprehension intact.   CN: EOEMI, VFF bilat, symmetric facial movements  Motor: BUE, BLE antigravity with no drift  Sensory: LT intact in all 4 limbs     Dysphagia Screen  Per Nursing    Imaging  I personally reviewed all imaging; relevant findings per HPI.    Labs Data   CBC  Recent Labs   Lab 04/25/23  0537 04/20/23  1804   WBC 7.7 11.6*   RBC 4.40 4.25*   HGB 12.2* 11.8*   HCT 37.6* 37.2*    238     Basic Metabolic Panel   Recent Labs   Lab 04/25/23  1706 04/25/23  1429 04/25/23  1150 04/25/23  0732 04/25/23  0537 04/24/23 2012 04/24/23  1751 04/24/23  0746 04/24/23  0546   NA  --   --   --   --  142  --  138  --  142   POTASSIUM  --   --   --   --  3.9  --  3.7  --  3.8   CHLORIDE  --   --   --   --  105  --  100  --  104   CO2  --   --   --   --  29  --  28  --  30*   BUN  --   --   --   --  8.1  --  8.1  --  8.9   CR  --   --   --   --  1.17  --  1.08  --  1.18*   * 271* 93   < > 61*   < > 236*   < > 92   LLOYD  --   --   --   --  9.3  --  9.1  --  9.2    < > = values in this interval not displayed.     Liver Panel  Recent Labs   Lab 04/20/23  1808   PROTTOTAL 6.1*   ALBUMIN 4.3   BILITOTAL 1.1   ALKPHOS 92   AST 16   ALT 6*     INR    Recent Labs   Lab Test 01/15/23  1321 11/21/20  1146 07/17/20  0950   INR 1.13 1.50* 1.18*      Lipid Profile    Recent Labs   Lab Test 01/17/23  0436 05/13/20  0553   CHOL 112 116   HDL 50 60   LDL 52 43   TRIG 51 65     A1C    Recent Labs   Lab Test 04/20/23  1804 01/17/23  0436 11/20/20  2233   A1C 8.0* 7.2* 7.7*     Troponin  No results for input(s): CTROPT, TROPONINIS, TROPONINI, GHTROP in the last 168 hours.

## 2023-04-26 NOTE — PROVIDER NOTIFICATION
Text page to Dr. Jon    Noticed neurology's note says to keep pts BP below 130, currently he is 140's. Please advise, thank you!    MD response: No new orders for now. (outpatient goal is <130, inpatient goal <160).

## 2023-04-26 NOTE — PROGRESS NOTES
[facility]    Stroke Progress Note    Interval Events- NAEON    HPI Summary  Tc Garcia is a 84 YO M w/vascular RFs: afib with previously associated strokes (last 1/15) not currently on AC 2/2 to ICH (IVH) in 1/15/2023, HTN on meds, HLD on statin, IDDM (last A1c 8) who is admitted on 4/20 for DKA.      Stroke Neuro consulted on 4/24 for questions regarding APT/AC resumption.      Of note, pt is under consideration from stroke neuro for ASPIRE trial following hospitalization for IVH in 1/2023 as above. Ischemic embolic strokes from afib also noted on imaging. Due to questions in regards to frequent falls, prior hemorrhage, and some miscommunication with his outpt Cardiology and stroke neuro/stroke research teams - unfortunately pt was not re-started on either AC or APT in a timely manner as outpt and dropped out of loop.      There had also been ongoing discussions as to whether LA appendage closure/occlusion device with Watchman was more appropriate for him as well.     MRI/Head CT MRI brain  1.  No acute intracranial process.  2.  Chronic hemorrhage products paralleling the lateral aspect of the right lateral ventricle likely related to the previous hemorrhage arising from the right temporal lobe.  3.  No recent hemorrhage.  4.  No evidence for enhancing lesion in the right temporal lobe.  5.  Generalized brain atrophy and presumed microvascular ischemic changes as detailed above    NCCT  1. Previous isodense blood products in the periventricular white  matter and right lateral ventricle from 2/11/2023 are no longer  identified. No new intracranial hemorrhage.   2. Moderate diffuse parenchymal volume loss and white matter changes  most likely due to chronic microvascular ischemic disease.   Intracranial Vasculature NA   Cervical Vasculature NA      Echocardiogram TTE  The visual ejection fraction is 60-65%.  Left ventricular systolic function is normal.  The right ventricle is  moderately dilated.  There is moderately severe (3+) tricuspid regurgitation.  Right ventricular systolic pressure is elevated, consistent with moderate to  severe pulmonary hypertension.  Mild valvular aortic stenosis.  The ascending aorta is Mildly dilated.  Trivial pericardial effusion  The rhythm was atrial fibrillation.     The left atrium is severely dilated.   EKG/Telemetry Atrial fibrillation    Other Testing Not Applicable       LDL  No lab value available in past 30 days   A1C  2023: 8.0 %   Troponin No lab value available in past 48 hrs        Impression/Recommendations  # Chronic spontaneous IVH 2/2 hypertension (presenting SBP 220s), chronic ischemic strokes in setting of afib/AC in 1/15/2023. NCCT with resolved IVH blood products, no acute ICH. Repeat MRI ken w/wo 3 months post hemorrhage reassuring against macrovascular/neoplastic etiology with stable burden of bilateral cerebellar/R temporal CMBs.    - Discussed and counseled pt on risks and benefits of APT or AC for secondary stroke prevention and risk of recurrent ICH - Stroke Neuro team recommends apixaban for secondary stroke prevention as spont ICH was in setting of /hypertension which has a very low risk of reoccurrence, especially if BP can be adequately controlled as outpt. Risk for ischemic stroke is very high given afib with strokes while on apixaban - suggestive of high risk afib. For him specifically, benefits of AC > risks. This was discussed with patient and he agrees with apixaban.   - For him specifically, there is no current role for reduced dosing apixaban (current SrCr < 1.5, wt > 60 kg), inappropriate reduced dosing apixaban increases stroke risks without reduction of bleeding risks per current data   - Per pt discussions, called outpt Cardiologist Dr. Josefina Julien to discuss plans, paged - waiting for return call   - Given concerns about cost for apixaban - Pharmacy liason ORDERED  - From neuro perspective, no  "concerns for pharmacological DVT PPx at this point    - Stop DVT PPx once apixaban resumed  - While IP -> goal normotension - IV PRNs for SBP < 160 and titrate PO's to goal  - For outpt -> BP goal < 130/90  - Stroke Neuro will cont to follow     Patient Follow-up    - Follow up with scheduled Neurology team from previous admission    No further stroke evaluation is recommended, so we will sign off. Please contact us with any additional questions.    The Stroke Staff is Dr. Li.    Beau Baron MD  Vascular Neurology Fellow    To page me or covering stroke neurology team member, click here: AMCOM  Choose \"On Call\" tab at top, then select \"NEUROLOGY/ALL SITES\" from middle drop-down box, press Enter, then look for \"stroke\" or \"telestroke\" for your site.    ______________________________________________________    Clinically Significant Risk Factors                       # DMII: A1C = 8.0 % (Ref range: <5.7 %) within past 6 months             Medications   Scheduled Meds    amLODIPine  5 mg Oral Daily     carvedilol  12.5 mg Oral BID w/meals     enoxaparin ANTICOAGULANT  40 mg Subcutaneous Q24H     finasteride  5 mg Oral Daily     insulin aspart  1-10 Units Subcutaneous TID AC     insulin glargine  8 Units Subcutaneous Daily with supper     irbesartan  150 mg Oral At Bedtime     tamsulosin  0.4 mg Oral Daily       Infusion Meds    - MEDICATION INSTRUCTIONS -         PRN Meds  acetaminophen **OR** acetaminophen, glucose **OR** dextrose **OR** glucagon, melatonin, nitroGLYcerin, ondansetron **OR** ondansetron, - MEDICATION INSTRUCTIONS -, prochlorperazine **OR** prochlorperazine **OR** prochlorperazine, sodium chloride (PF)       PHYSICAL EXAMINATION  Temp:  [97.7  F (36.5  C)-98.1  F (36.7  C)] 97.7  F (36.5  C)  Pulse:  [56-77] 56  Resp:  [16-18] 18  BP: (133-156)/(67-92) 156/91  SpO2:  [93 %-97 %] 97 %      Neuro Exam  MS: AO to person, month, year, FSH. Fluent speech, comprehension intact.   CN: EOEMI, VFF " bilat, symmetric facial movements  Motor: BUE, BLE antigravity with no drift  Sensory: LT intact in all 4 limbs       Imaging  I personally reviewed all imaging; relevant findings per HPI.     Lab Results Data   CBC  Recent Labs   Lab 04/26/23  1029 04/25/23  0537 04/20/23  1804   WBC 8.7 7.7 11.6*   RBC 4.49 4.40 4.25*   HGB 12.3* 12.2* 11.8*   HCT 38.2* 37.6* 37.2*    193 238     Basic Metabolic Panel    Recent Labs   Lab 04/26/23  1123 04/26/23  1029 04/26/23  0743 04/25/23  0732 04/25/23  0537 04/24/23 2012 04/24/23  1751   NA  --  136  --   --  142  --  138   POTASSIUM  --  3.9  --   --  3.9  --  3.7   CHLORIDE  --  98  --   --  105  --  100   CO2  --  30*  --   --  29  --  28   BUN  --  11.6  --   --  8.1  --  8.1   CR  --  1.16  --   --  1.17  --  1.08   * 347* 151*   < > 61*   < > 236*   LLOYD  --  9.0  --   --  9.3  --  9.1    < > = values in this interval not displayed.     Liver Panel  Recent Labs   Lab 04/20/23  1808   PROTTOTAL 6.1*   ALBUMIN 4.3   BILITOTAL 1.1   ALKPHOS 92   AST 16   ALT 6*     INR    Recent Labs   Lab Test 01/15/23  1321 11/21/20  1146 07/17/20  0950   INR 1.13 1.50* 1.18*      Lipid Profile    Recent Labs   Lab Test 01/17/23  0436 05/13/20  0553   CHOL 112 116   HDL 50 60   LDL 52 43   TRIG 51 65     A1C    Recent Labs   Lab Test 04/20/23  1804 01/17/23  0436 11/20/20  2233   A1C 8.0* 7.2* 7.7*     Troponin  No results for input(s): CTROPT, TROPONINIS, TROPONINI, GHTROP in the last 168 hours.       Data

## 2023-04-26 NOTE — PROGRESS NOTES
04/26/23 0900   Signing Clinician's Name / Credentials   Signing clinician's name / credentials Yi Carmona, PT, DPT   Dynamic Gait Index (Bony and Lewis [place], 1995)   Gait Level Surface 2   Change in Gait Speed 3   Gait and Horizontal Head Turns 3   Gait with Vertical Head Turns 2   Gait and Pivot Turns 2   Step Over Obstacle 3   Step Around Obstacles 3   Steps 2   Total Dynamic Gait Index Score  (A score of 19 or less has been correlated to an increased risk of falls in community dwelling older adults, patients with vestibular disorders, and patients with MS.)   Total Score (out of 24) 20       Dynamic Gait Index (DGI):The DGI is a measure of balance during gait that is reliable and valid for the elderly and individuals with Parkinson's disease, MS, vestibular disorders, or s/p stroke. Gait assistive device used: None     Scores ?19 /24 indicate an increased risk for falls according to Malika et al 2000  Minimal Detectable Change = 2.9 in community dwelling elderly according to Armstrong et al 2011     Assessment (rationale for performing, application to patient s function & care plan): to assess fall risk and to assist with discharge recommendations.   (Minutes billed included in gait charge): 10 min

## 2023-04-27 ENCOUNTER — APPOINTMENT (OUTPATIENT)
Dept: NUCLEAR MEDICINE | Facility: CLINIC | Age: 83
DRG: 638 | End: 2023-04-27
Attending: INTERNAL MEDICINE
Payer: COMMERCIAL

## 2023-04-27 ENCOUNTER — APPOINTMENT (OUTPATIENT)
Dept: PHYSICAL THERAPY | Facility: CLINIC | Age: 83
DRG: 638 | End: 2023-04-27
Payer: COMMERCIAL

## 2023-04-27 ENCOUNTER — APPOINTMENT (OUTPATIENT)
Dept: ULTRASOUND IMAGING | Facility: CLINIC | Age: 83
DRG: 638 | End: 2023-04-27
Attending: INTERNAL MEDICINE
Payer: COMMERCIAL

## 2023-04-27 ENCOUNTER — APPOINTMENT (OUTPATIENT)
Dept: GENERAL RADIOLOGY | Facility: CLINIC | Age: 83
DRG: 638 | End: 2023-04-27
Attending: INTERNAL MEDICINE
Payer: COMMERCIAL

## 2023-04-27 LAB
ANION GAP SERPL CALCULATED.3IONS-SCNC: 10 MMOL/L (ref 7–15)
ANION GAP SERPL CALCULATED.3IONS-SCNC: 14 MMOL/L (ref 7–15)
BUN SERPL-MCNC: 14 MG/DL (ref 8–23)
BUN SERPL-MCNC: 15.6 MG/DL (ref 8–23)
CALCIUM SERPL-MCNC: 8.9 MG/DL (ref 8.8–10.2)
CALCIUM SERPL-MCNC: 8.9 MG/DL (ref 8.8–10.2)
CHLORIDE SERPL-SCNC: 96 MMOL/L (ref 98–107)
CHLORIDE SERPL-SCNC: 98 MMOL/L (ref 98–107)
CREAT SERPL-MCNC: 1.1 MG/DL (ref 0.67–1.17)
CREAT SERPL-MCNC: 1.15 MG/DL (ref 0.67–1.17)
DEPRECATED HCO3 PLAS-SCNC: 25 MMOL/L (ref 22–29)
DEPRECATED HCO3 PLAS-SCNC: 27 MMOL/L (ref 22–29)
GFR SERPL CREATININE-BSD FRML MDRD: 63 ML/MIN/1.73M2
GFR SERPL CREATININE-BSD FRML MDRD: 66 ML/MIN/1.73M2
GLUCOSE BLDC GLUCOMTR-MCNC: 188 MG/DL (ref 70–99)
GLUCOSE BLDC GLUCOMTR-MCNC: 236 MG/DL (ref 70–99)
GLUCOSE BLDC GLUCOMTR-MCNC: 267 MG/DL (ref 70–99)
GLUCOSE BLDC GLUCOMTR-MCNC: 329 MG/DL (ref 70–99)
GLUCOSE BLDC GLUCOMTR-MCNC: 332 MG/DL (ref 70–99)
GLUCOSE BLDC GLUCOMTR-MCNC: 344 MG/DL (ref 70–99)
GLUCOSE BLDC GLUCOMTR-MCNC: 353 MG/DL (ref 70–99)
GLUCOSE BLDC GLUCOMTR-MCNC: 369 MG/DL (ref 70–99)
GLUCOSE SERPL-MCNC: 358 MG/DL (ref 70–99)
GLUCOSE SERPL-MCNC: 389 MG/DL (ref 70–99)
NT-PROBNP SERPL-MCNC: 2533 PG/ML (ref 0–1800)
POTASSIUM SERPL-SCNC: 3.9 MMOL/L (ref 3.4–5.3)
POTASSIUM SERPL-SCNC: 4 MMOL/L (ref 3.4–5.3)
SODIUM SERPL-SCNC: 135 MMOL/L (ref 136–145)
SODIUM SERPL-SCNC: 135 MMOL/L (ref 136–145)

## 2023-04-27 PROCEDURE — 71046 X-RAY EXAM CHEST 2 VIEWS: CPT

## 2023-04-27 PROCEDURE — A9567 TECHNETIUM TC-99M AEROSOL: HCPCS | Performed by: INTERNAL MEDICINE

## 2023-04-27 PROCEDURE — 258N000003 HC RX IP 258 OP 636: Performed by: INTERNAL MEDICINE

## 2023-04-27 PROCEDURE — 343N000001 HC RX 343: Performed by: INTERNAL MEDICINE

## 2023-04-27 PROCEDURE — 120N000001 HC R&B MED SURG/OB

## 2023-04-27 PROCEDURE — 250N000013 HC RX MED GY IP 250 OP 250 PS 637: Performed by: NURSE PRACTITIONER

## 2023-04-27 PROCEDURE — 99232 SBSQ HOSP IP/OBS MODERATE 35: CPT | Performed by: INTERNAL MEDICINE

## 2023-04-27 PROCEDURE — 99223 1ST HOSP IP/OBS HIGH 75: CPT | Mod: FS | Performed by: NURSE PRACTITIONER

## 2023-04-27 PROCEDURE — 250N000013 HC RX MED GY IP 250 OP 250 PS 637: Performed by: HOSPITALIST

## 2023-04-27 PROCEDURE — 97116 GAIT TRAINING THERAPY: CPT | Mod: GP

## 2023-04-27 PROCEDURE — 36415 COLL VENOUS BLD VENIPUNCTURE: CPT | Performed by: INTERNAL MEDICINE

## 2023-04-27 PROCEDURE — 250N000013 HC RX MED GY IP 250 OP 250 PS 637

## 2023-04-27 PROCEDURE — 97110 THERAPEUTIC EXERCISES: CPT | Mod: GP

## 2023-04-27 PROCEDURE — 78582 LUNG VENTILAT&PERFUS IMAGING: CPT

## 2023-04-27 PROCEDURE — A9540 TC99M MAA: HCPCS | Performed by: INTERNAL MEDICINE

## 2023-04-27 PROCEDURE — 272N000035 NM LUNG SCAN VENTILATION AND PERFUSION

## 2023-04-27 PROCEDURE — 250N000013 HC RX MED GY IP 250 OP 250 PS 637: Performed by: INTERNAL MEDICINE

## 2023-04-27 PROCEDURE — 36415 COLL VENOUS BLD VENIPUNCTURE: CPT | Performed by: NURSE PRACTITIONER

## 2023-04-27 PROCEDURE — 93970 EXTREMITY STUDY: CPT

## 2023-04-27 PROCEDURE — 80048 BASIC METABOLIC PNL TOTAL CA: CPT | Performed by: INTERNAL MEDICINE

## 2023-04-27 PROCEDURE — 258N000002 HC RX IP 258 OP 250: Performed by: INTERNAL MEDICINE

## 2023-04-27 PROCEDURE — 83880 ASSAY OF NATRIURETIC PEPTIDE: CPT | Performed by: NURSE PRACTITIONER

## 2023-04-27 RX ORDER — SODIUM CHLORIDE 9 MG/ML
INJECTION, SOLUTION INTRAVENOUS CONTINUOUS
Status: DISCONTINUED | OUTPATIENT
Start: 2023-04-27 | End: 2023-04-30

## 2023-04-27 RX ORDER — NICOTINE POLACRILEX 4 MG
15-30 LOZENGE BUCCAL
Status: DISCONTINUED | OUTPATIENT
Start: 2023-04-27 | End: 2023-04-27

## 2023-04-27 RX ORDER — NICOTINE POLACRILEX 4 MG
15-30 LOZENGE BUCCAL
Qty: 15 G | Refills: 3 | Status: SHIPPED | OUTPATIENT
Start: 2023-04-27 | End: 2023-05-01

## 2023-04-27 RX ORDER — DEXTROSE MONOHYDRATE 25 G/50ML
25-50 INJECTION, SOLUTION INTRAVENOUS
Status: DISCONTINUED | OUTPATIENT
Start: 2023-04-27 | End: 2023-04-27

## 2023-04-27 RX ORDER — DEXTROSE MONOHYDRATE 25 G/50ML
25-50 INJECTION, SOLUTION INTRAVENOUS
Status: DISCONTINUED | OUTPATIENT
Start: 2023-04-27 | End: 2023-04-28

## 2023-04-27 RX ORDER — NICOTINE POLACRILEX 4 MG
15-30 LOZENGE BUCCAL
Status: DISCONTINUED | OUTPATIENT
Start: 2023-04-27 | End: 2023-04-28

## 2023-04-27 RX ORDER — BISACODYL 10 MG
10 SUPPOSITORY, RECTAL RECTAL DAILY PRN
Status: DISCONTINUED | OUTPATIENT
Start: 2023-04-27 | End: 2023-05-02 | Stop reason: HOSPADM

## 2023-04-27 RX ORDER — FUROSEMIDE 20 MG
20 TABLET ORAL DAILY
Qty: 30 TABLET | Refills: 0 | Status: SHIPPED | OUTPATIENT
Start: 2023-04-28 | End: 2023-05-01

## 2023-04-27 RX ORDER — FUROSEMIDE 20 MG
20 TABLET ORAL DAILY
Status: DISCONTINUED | OUTPATIENT
Start: 2023-04-27 | End: 2023-04-30

## 2023-04-27 RX ORDER — SENNOSIDES 8.6 MG
2 TABLET ORAL 2 TIMES DAILY
Status: DISCONTINUED | OUTPATIENT
Start: 2023-04-27 | End: 2023-05-02 | Stop reason: HOSPADM

## 2023-04-27 RX ORDER — SODIUM CHLORIDE 450 MG/100ML
INJECTION, SOLUTION INTRAVENOUS CONTINUOUS
Status: DISCONTINUED | OUTPATIENT
Start: 2023-04-27 | End: 2023-04-27

## 2023-04-27 RX ORDER — ACETAMINOPHEN 325 MG/1
650 TABLET ORAL EVERY 6 HOURS PRN
Refills: 0
Start: 2023-04-27 | End: 2024-01-01

## 2023-04-27 RX ADMIN — APIXABAN 5 MG: 5 TABLET, FILM COATED ORAL at 11:30

## 2023-04-27 RX ADMIN — KIT FOR THE PREPARATION OF TECHNETIUM TC 99M PENTETATE 64.4 MILLICURIE: 20 INJECTION, POWDER, LYOPHILIZED, FOR SOLUTION INTRAVENOUS; RESPIRATORY (INHALATION) at 12:55

## 2023-04-27 RX ADMIN — BISACODYL 10 MG: 10 SUPPOSITORY RECTAL at 23:41

## 2023-04-27 RX ADMIN — IRBESARTAN 150 MG: 150 TABLET ORAL at 21:04

## 2023-04-27 RX ADMIN — TAMSULOSIN HYDROCHLORIDE 0.4 MG: 0.4 CAPSULE ORAL at 09:10

## 2023-04-27 RX ADMIN — KIT FOR THE PREPARATION OF TECHNETIUM TC 99M ALBUMIN AGGREGATED 6 MILLICURIE: 2.5 INJECTION, POWDER, FOR SOLUTION INTRAVENOUS at 13:30

## 2023-04-27 RX ADMIN — CARVEDILOL 12.5 MG: 12.5 TABLET, FILM COATED ORAL at 09:10

## 2023-04-27 RX ADMIN — APIXABAN 5 MG: 5 TABLET, FILM COATED ORAL at 21:04

## 2023-04-27 RX ADMIN — AMLODIPINE BESYLATE 5 MG: 5 TABLET ORAL at 09:10

## 2023-04-27 RX ADMIN — CARVEDILOL 12.5 MG: 12.5 TABLET, FILM COATED ORAL at 17:08

## 2023-04-27 RX ADMIN — SODIUM CHLORIDE: 4.5 INJECTION, SOLUTION INTRAVENOUS at 09:39

## 2023-04-27 RX ADMIN — SENNOSIDES 2 TABLET: 8.6 TABLET ORAL at 23:40

## 2023-04-27 RX ADMIN — SODIUM CHLORIDE: 9 INJECTION, SOLUTION INTRAVENOUS at 23:36

## 2023-04-27 RX ADMIN — FINASTERIDE 5 MG: 5 TABLET, FILM COATED ORAL at 09:10

## 2023-04-27 RX ADMIN — FUROSEMIDE 20 MG: 20 TABLET ORAL at 13:58

## 2023-04-27 ASSESSMENT — ACTIVITIES OF DAILY LIVING (ADL)
ADLS_ACUITY_SCORE: 30

## 2023-04-27 NOTE — PROGRESS NOTES
Care Management Follow Up    Length of Stay (days): 7    Expected Discharge Date: 04/28/2023     Concerns to be Addressed:       Patient plan of care discussed at interdisciplinary rounds: Yes    Anticipated Discharge Disposition:       Anticipated Discharge Services:    Anticipated Discharge DME:      Patient/family educated on Medicare website which has current facility and service quality ratings:    Education Provided on the Discharge Plan:    Patient/Family in Agreement with the Plan: yes    Referrals Placed by CM/SW:    Private pay costs discussed: Not applicable    Additional Information:  Following for discharge planning to home w/ C.   Per MD Pt will discharge tomorrow morning pending cardiology/testing today.  MD had arranged follow-up with Endocrinology (Dr. Harding) for 9:30am tomorrow.  Plan to discharge from hospital to that appointment.     CC met with patient to talk about discharge plans.   Reviewed recommendation for [facility] RN/OT.  Pt does not want OT evaluation.  Is open to RN  Pt accepted by [employer] (done via [employer]epic)    Pt would like Follow-up arranged with PCP.    Follow up with your Primary Doctor  Jessika Cordoba [phone] [address], [place] [address]   Monday May 1st 11:30am.  Please bring this paperwork with to the hospital.     Follow-up for Urology, [facility] consultants, and [facility] on AVS.  Pt will make those appointments if he does not receive call.    Pt needs a ride at discharge. He would like to take taxi home to get a few things prior to going to Endocrinology appointment.      CC on 4/28 will call for taxi (ideally around 8am).    Send orders to [facility].   MD updated.       Sasha Goel RN

## 2023-04-27 NOTE — PROGRESS NOTES
"[facility]  Hospitalist Progress Note    Date of Admission: 4/20/2023    Interval History   Patient with plans for discharge tomorrow. He was seen by neurology this admission, Cardiology consulted. Pulmonary hypertension on ECHO and TR. Per cardiology likely fluid overload.   Started on diuretic with close outpatient follow up.   He will go to diabetes clinic tomorrow     Assessment & Plan   Tc Garcia is a 83 year old male with a history of diabetes mellitus type 2, paroxysmal atrial fibrillation, hypertension, hyperlipidemia, pulmonary hypertension, spontaneous intracranial hemorrhage, recurrent pleural effusion, chronic kidney disease stage III, and anemia of chronic disease who presented to the ER with elevated blood sugars.  In the ER he was found to have diabetic ketoacidosis.  He was given IV fluids, potassium, and IV insulin.  The hospitalist service was contacted to admit him for further evaluation management.     Diabetic ketoacidosis  Diabetes mellitus type 1  Brittle/labile blood sugars   Hemoglobin A1c 8.0 on 4/20/2023.  He has an insulin pump at home and has been using it, denies any interruptions or errors.  No recent changes in his diet.  No obvious inciting factor identified thus far.  In the ER he had a glucose of 547, ketones of 3.8, bicarb 18, anion gap 23.    Admit to inpatient.    4/20 admitted under the DKA protocol>> stopped on 4/21    Insulin pump is on hold for now.>  While in the hospital given lability holding on his insulin pump    Prior concerns in Epic in 2/2023 while at TCU that his DM was brittle. In addition reported cognitive impairment. IDT did not recommend patient return to home and needed a high level of care. The family declined. >> \"dad will not agree to move to [facility]\" \"he does what he wants to do\" [facility] report filed at the TCU     Chest x-ray with no new changes.  UA negative and urine culture negative.  No sign of infection.    Given the lability and " brittle nature of his diabetes suspect ongoing challenges to diabetes control    4/24 called primary endocrinology clinic.  His primary endocrinologist is concerned about the patient continuing on his insulin pump and wants to stop it.    In agreement the patient should likely be in a different living situation. HE has very brittle DM. At risk of extreme glucose changes and hypoglycemia.    4/24 nursing staff will assess patient's ability to manage subcu insulin which suspect will be very challenging.    4/25 will reduce Lantus to 8 units.  Stop carb counts with sliding scale to simplify his subcu regimen:     4/26 multiple discussions again regarding his insulin pump and discharge planning.  Spoke with his primary endocrinologist in the diabetes clinic    There is a consensus that this is not an ideal situation but options are extremely limited.    His subcu insulin is quite labile in terms of blood sugar management.>>  He has done much better clinically with the pump despite some uncertainty about his subcu insulin management although he seems to have a decent grasp on this in the hospital.    Does not meet criteria for a TCU is he is actually doing too well.  Patient has declined what would be long-term care just so they can do his Accu-Cheks 4 times a day.    Family the,  Patient,  endocrinology all understand his current clinical situation and are attempting to make the best decision under the circumstances and the best safety options for him at home.    He has had the insulin pump since 2019    Plans to resume his pump at home without appointment tomorrow at 10:30 AM to start the pump and go over his continuous glucose monitor.    We will send in home care.    He does have a meter that should notify him if his blood sugar is critically low as there is an alarm on this    4/27 confirmed with endocrionology clinic his follow up tomorrow at 9:30 AM      Acute kidney injury  Chronic kidney disease, stage  III    Baseline creatinine appears to be around 1.2.  Creatinine up to 1.72 in the ER.    Patient received IV fluids.  His creatinine is elevated.    Known history of retention again noted this admission    Creatinine 1.62 but high PVR with straight cath required    May be post obstructive rather than all dehydration    Creatinine 1.17     Lower extremity edema  Dyspnea on exertion  Has developed lower extremity edema and dyspnea on exertion over the last several days.  No prior history of congestive heart failure.    Echo with EF of 60 to 65%.  Right ventricle moderately dilated.  Moderately severe 3+ tricuspid regurgitation.  Moderate to severe pulmonary hypertension. (pulmonary hypertension on prior ECHO 2021)     4/22 venous dopplers negative of legs.     No complaints of shortness of breath and on RA     4/27 cardiology consult >> Increase in TR and RV dilation    V/Q no PE and ultrasound no DVT    Likely fluid overload per cardiology with Right sided higher pressures and edeam in legs.     Discharge with lasix 20 mg po daily      Urinary retention    Noted on prior hospital to stay.    Patient required intermittent straight cathing in the past.    Started on Flomax in January 2023    This admission noted again to have urinary retention requiring straight cath.  May be contributing to increase renal function.    Monitor renal function as post obstruction is removed    4/23 Cho catheter placed with recurrent retention >> requested urology consult    Indwelling cho catheter at discharge       Paroxysmal atrial fibrillation    Not on anticoagulation due to recent intracranial hemorrhage.    Continue PTA carvedilol.    Start of eliquis potentially but need to confirm with spine clinic ok to do      History of spontaneous intracranial hemorrhage  Admitted to [facility] in January 2023 due to nontraumatic intraventricular intracerebral hemorrhage which was managed nonoperatively.    Noted.    Patient  presented with dizziness, lightheadedness and a new intraventricular hemorrhage.    He was given Kcentra for anticoagulation reversal.    Had reported history of multiple falls prior to admission but no known head trauma that he reported.    4/24 stroke neurology consult: upon further discussion there appears to be potential unresolved or not clarified follow up assessments to his previous stroke/hemorrhage.     4/26 reconsulted neurology stroke team.  CT and MRI showing no acute stroke.  Old changes from prior hemorrhage.    Previous stroke thought secondary to untreated hypertension with microvascular disease rather than risk of spontaneous bleed.    Recommending with his atrial fibrillation risk that the patient goes on Eliquis.    As noted from therapy he is steady.  Ambulated around the wards with no loss of balance.    He would be discharged on 5 mg twice a day.    Called [facility] and ok to anticoagulate. Had kyphoplasty on last Monday     Will discharge on 5 mg po BID      Hypertension  Blood pressure fairly well controlled in the ER.    Continue PTA amlodipine and carvedilol.    Hold PTA irbesartan in setting of acute kidney injury    Monitor BP closely.    Anticipate renal function may improve with straight caths..    As per neurology wtill need to improve /80     Anemia of chronic disease  Hemoglobin 11.8 on 4/20/2023.    Recheck in AM.    Hemoglobin 11.8 on last check and repeat on 4/25     Disposition    Case management involved.    In the past felt unsafe for patient to be at home with even a vulnerable adult report filed.    Interdisciplinary team's at the TCU did not feel he should go home but the family was okay with discharge as they felt the patient requested this    Again concerns about home safety    SLUMS 27/30 4/22 patient insisted that he is going home irregardless of any risk.  He is continued to convey this message. Will try to contact the endocrinology clinic and plan for  reattachment of his insulin pump. Also trouble shoot if pump having problems.     4/24 for further updates from his endocrinologist who is no longer comfortable continuing the insulin pump for Tc.  Concerns that he is too high risk.  We will try to convert him back over to subcu insulin as he has been on this admission.  Currently on insulin per carb count plus sliding scale.  This would be challenging in a facility.  Attempt to get some type of sliding scale more likely.    4/24 initial thoughts that patient qualified for TCU    4/25 did not meet criteria for TCU.  Going back to reevaluate his diabetes control after dialogue this morning.    4/26 rediscussion with his endocrinologist and will start his insulin pump and have follow up appointment 4/28 in the endocrinology clinic     Diet: Moderate Consistent Carb (60 g CHO per Meal) Diet  Snacks/Supplements Adult: Other; snack at 10 pm; Between Meals  Pruett Catheter: PRESENT, indication: Retention  DVT Prophylaxis: compression pneumoboots   Code Status: No CPR- Do NOT Intubate       Expected Discharge Date: 04/28/2023,  8:30 AM    Destination: inpatient rehabilitation facility  Discharge Comments: DKA, MANPREET  SLUMS 27/30 4/26: Pt needs diabetic teaching  4/27: home early tomorrow       SMILEY LUIS MD,   Hospitalist Service  [facility]  ______________________________________________________________________    -Data reviewed today: I reviewed all new labs and imaging results over the last 24 hours. I personally reviewed no images or EKG's today.    Physical Exam   Temp: 98.1  F (36.7  C) Temp src: Oral BP: 134/77 Pulse: 58   Resp: 16 SpO2: 98 % O2 Device: None (Room air)    Vitals:    04/20/23 1801   Weight: 72.6 kg (160 lb)   Constitutional: Patient is in no acute distress.  Respiratory: Breath sounds CTA. No increased work of breathing.  Cardiovascular: RRR, no rub or murmur.   GI: Soft, non-tender, non-distended.  Skin: Warm, dry, no  rashes or lesions.  Other: 1-2 + edema   Patient has a Pruett catheter in place    Medications     - MEDICATION INSTRUCTIONS -         amLODIPine  5 mg Oral Daily     apixaban ANTICOAGULANT  5 mg Oral BID     carvedilol  12.5 mg Oral BID w/meals     finasteride  5 mg Oral Daily     furosemide  20 mg Oral Daily     insulin aspart   Subcutaneous Daily with breakfast     insulin aspart   Subcutaneous Daily with lunch     insulin aspart   Subcutaneous Daily with supper     insulin aspart  1-3 Units Subcutaneous TID AC     insulin glargine  8 Units Subcutaneous Daily with supper     irbesartan  150 mg Oral At Bedtime     tamsulosin  0.4 mg Oral Daily       Data   Recent Labs   Lab 04/27/23  1655 04/27/23  1618 04/27/23  1355 04/26/23  2201 04/26/23  1714 04/26/23  1123 04/26/23  1029 04/25/23  0732 04/25/23  0537   WBC  --   --   --   --   --   --  8.7  --  7.7   HGB  --   --   --   --   --   --  12.3*  --  12.2*   MCV  --   --   --   --   --   --  85  --  86   PLT  --   --   --   --   --   --  215  --  193   NA  --   --   --   --  137  137  --  136  --  142   POTASSIUM  --   --   --   --  4.0  --  3.9  --  3.9   CHLORIDE  --   --   --   --  96*  --  98  --  105   CO2  --   --   --   --  26  --  30*  --  29   BUN  --   --   --   --  14.6  --  11.6  --  8.1   CR  --   --   --   --  1.14  --  1.16  --  1.17   ANIONGAP  --   --   --   --  15  --  8  --  8   LLOYD  --   --   --   --  9.2  --  9.0  --  9.3   * 369* 353*   < > 426*   < > 347*   < > 61*    < > = values in this interval not displayed.       Imaging:  Recent Results (from the past 24 hour(s))   US Lower Extremity Venous Duplex Bilateral    Narrative    VENOUS ULTRASOUND BILATERAL LEG(S)  4/27/2023 8:32 AM     HISTORY: Bilateral lower extremity Swelling + ddimer    COMPARISON: 4/22/2023.    FINDINGS: Examination of the deep veins with graded compression and  color flow Doppler with spectral wave form analysis was performed.  Images show no evidence of  thrombus in the bilateral common femoral  vein, femoral vein, popliteal vein or calf veins.      Impression    IMPRESSION: No deep vein thrombosis in either lower extremity.      CHON SARAH DO         SYSTEM ID:  [id number]   XR Chest 2 Views    Narrative    CHEST TWO VIEWS 4/27/2023 1:00 PM     HISTORY: Prior to nuclear medicine VQ lung scan.    COMPARISON: April 20, 2023       Impression    IMPRESSION: Similar minimal pleural fluid and pleural thickening as  well as atelectasis and/or fibrosis on the left. Trace pleural fluid  on the right. Remaining right lung clear. The cardiac silhouette is  not enlarged. Pulmonary vasculature is unremarkable.    ALLYSON MARTINEZ MD         SYSTEM ID:  [id number]   NM Lung Scan Ventilation and Perfusion    Narrative    EXAM: NM LUNG SCAN VENTILATION AND PERFUSION  LOCATION: [facility]  DATE/TIME: 4/27/2023 1:43 PM CDT    INDICATION: Shortness of breath.  COMPARISON: Chest x-ray dated 04/27/2023.  TECHNIQUE: 64.4 mCi Tc-99m DTPA inhaled. 6.0 mCi Tc-99m MAA, IV. Standard planar imaging during perfusion and ventilation portions of exam.    FINDINGS: Mildly heterogeneous pulmonary ventilation. Normal pulmonary perfusion. No mismatched segmental perfusion defect.      Impression    IMPRESSION:    No evidence of pulmonary embolism.

## 2023-04-27 NOTE — CONSULTS
[facility]    Cardiology Consultation     Primary Cardiologist: Dr. Julien    Date of Admission: 04/20/2023  Service Date: 04/27/2023    Summary:  Mr. Tc Garcia is an 83 year old male who I have met in Cardiology clinic. He has a complex past medical history notable for chronic HFpEF, pulmonary hypertension, chronic recurrent transudative left pleural effusions, paroxsymal atrial fibrillation/flutter, type 2 diabetes mellitus, hypertension, hyperlipidemia, chronic kidney disease stage 3, anemia of chronic disease, obesity, and history of medication non-compliance which I suspect is secondary at least in part to cognitive impairment. He was admitted on 4/20/23 after presenting with several days of general malaise and hyperglycemia and being found to have diabetic ketoacidosis. Cardiology is now consulted for pulmonary hypertension and tricuspid regurgitation noted on echocardiogram.     Assessment:  1. Chronic HFpEF  2. Pulmonary hypertension  3. Tricuspid regurgitation  4. Chronic atrial fibrillation, rate controlled. Previously on apixaban but stopped due to intraventricular intracerebral hemorrhage.  5. Nontraumatic intraventricular intracerebral hemorrhage in 01/2023   6. Type 2 diabetes mellitus with DKA   7. Labile hypertension  8. Hyperlipidemia  9. CKD stage III, follows with [facility].  10. Urinary retention requiring intermittent straight cath  11. Anemia of chronic disease  12. Cognitive impairment/dementia    Plan:   1. Suspect worsening of tricuspid regurgitation and pulmonary pressures on echocardiogram may be in part due to mild volume overload. Will manage conservatively with low dose p.o. lasix 20 mg once daily.  2. Start checking daily weights, strict I/Os, stick to low sodium diet, and closely monitor renal function and electrolytes.  3. Blood pressure reasonably well controlled. Continue with current regimen with amlodipine, carvedilol, and irbesartan.  4.  Defer to neurology regarding anticoagulation given history of intracerebral hemorrhage. Note that they are okay with this and apixaban has been restarted at 5 mg BID.  5. Recommend follow up with a cardiology TAMAR in about 1-2 weeks post discharge with a repeat basic metabolic panel beforehand.  6. [facility] or at a minimum with home care arranged to help with medication setup given hx of memory issues and medication noncompliance contributing to labile control of diabetes, hypertension, and HFpEF with frequent admissions.  7. No further recommendations from a cardiac standpoint. We will sign off. Please do not hesitate to call with questions.     High complexity     Thank you for the opportunity to participate in this pleasant patient's care.     OMAR Whitten, CNP   Nurse Practitioner  [facility]  Pager: [phone]  Text Page or securely message via [employer] (8am - 5pm, M-F)    History of Present Illness:  Mr. Tc Garcia is an 83 year old male who I have met in Cardiology clinic. He has a complex past medical history notable for chronic HFpEF, pulmonary hypertension, chronic recurrent transudative left pleural effusions, paroxsymal atrial fibrillation/flutter, type 2 diabetes mellitus, hypertension, hyperlipidemia, chronic kidney disease stage 3 with baseline creatinine around 1.4-1.7, anemia of chronic disease, obesity, and history of medication non-compliance which I suspect is secondary at least in part to cognitive impairment.     He has had several hospitalizations over the past 3 months. He presented initially on 1/15/23 with weakness and altered mental status and was found to have nontraumatic intraventricular intracerebral hemorrhage in the setting of hypertension and anticoagulation with apixaban. He received Kcentra for anticoagulation reversal. He re-presented on 1/29/23 with very labile blood sugars going from the 600s to under 50 after  "treatment. He was also hypotensive initially in the setting of hypovolemia and had some issues with labile blood pressures during his stay as well. Several adjustments were made to medications, including discontinuation of apixaban. Irbesratan was decreased from 150 mg to 75 mg once daily. He was also previously on lasix 40 mg once daily as well as amlodipine at 10 mg once daily and these were both stopped.      He was later evaluated again in the ED on 2/11/2023 with \"seizure like activity\" - per chart review, he was noted to have some stiffening but no extremity shaking and decreased level of consciousness with blood glucose at the time noted to be 33. Head CT showed resolving hemorrhage.     The patient was discharged to a TCU. He was noted to have cognitive impairment and forgetfulness on evaluation at the TCU with a MoCA score of 24/30 and discharge to an assisted living facility was recommended. The patient refused and ended up discharging home with home care.     He is admitted after presenting on 4/20/2023 with generalized malaise for a few, hyperglycemia, and being found to have diabetic ketoacidosis.     Cardiology is consulted as an echocardiogram was completed on 4/21/2023 showing EF 60 to 65%. Right ventricle is moderately dilated. Moderately severe 3+ tricuspid regurgitation is noted, as well as moderate to severe pulmonary hypertension. A prior echocardiogram in March 2021 appeared similar per report but with milder pulmonary hypertension and mild (1+) tricuspid regurgitation. He has previously been on diuretics with lasix 40 mg once daily, but this was discontinued in January 2023 due to issues with hypotension and hypovolemia with labile blood pressures at that time.     Weights have not been checked regularly here. There is one weight documented at the time of admission at 160 pounds, which is up slightly from previous clinics weights which had been closer to 150-155 pounds over the past few " months. He has been getting fluids here in the setting of DKA and with ABBIE. Renal function has improved since and with intermittent straight cathing for urinary retention. Patient denies symptoms of shortness of breath and oxygen saturation is stable on room air.  He states he has been up and walking the halls and has been tolerating this well without any significant exertional dyspnea. He has not had symptoms of chest pain, palpitations, dizziness, or lightheadedness. Venous dopplers of the legs were completed on 4/22/23 and were negative for DVT.     Primary Care Physician   Jessika Cordoba    Reason for Consult   Reason for consult: I was asked by Dr. Ana Jon to evaluate this patient for abnormal echocardiogram findings.    Past Medical History   Past Medical History:   Diagnosis Date     Anemia      Anemia of chronic disease 5/14/2020     Back pain      CKD (chronic kidney disease) stage 3, GFR 30-59 ml/min (H)      CRF (chronic renal failure), stage 3  5/14/2020     Fall 11/2019    suffered multiple left rib fractures, C3 and T2 fractures     Mixed hyperlipidemia 7/7/2004     Paroxysmal atrial fibrillation (H)      Personal history of colonic polyps 1972    gets colonoscopy every five years, due in 2006     Pleural effusion on left 11/2019    after multiple rib fractures     Pulmonary hypertension (H) 5/10/2020    Added automatically from request for surgery [id number]     Recurrent Left Pleural effusion -- S/P Pleurex Cath 5/12/20 12/30/2019     Rosacea 1989     Type II or unspecified type diabetes mellitus without mention of complication, not stated as uncontrolled 1999     Unspecified essential hypertension 1994     Past Surgical History   Past Surgical History:   Procedure Laterality Date     ANESTHESIA CARDIOVERSION N/A 2/3/2020    Procedure: ANESTHESIA, FOR CARDIOVERSION;  Surgeon: GENERIC ANESTHESIA PROVIDER;  Location:  OR     CV RIGHT HEART CATH MEASUREMENTS RECORDED N/A 5/13/2020     Procedure: Right Heart Cath;  Surgeon: Senthil Silva MD;  Location:  HEART CARDIAC CATH LAB     HC REMOVE TONSILS/ADENOIDS,<11 Y/O      T & A <11y.o.     IR CHEST TUBE DRAIN TUNNELED LEFT  5/12/2020     IR CHEST TUBE PLACEMENT NON-TUNNELLED LEFT  7/11/2020     IR CHEST TUBE REMOVAL NON TUNNELED LEFT  7/17/2020     IR CHEST TUBE REMOVAL TUNNELED LEFT  7/11/2020     LAPAROSCOPIC CHOLECYSTECTOMY N/A 11/22/2020    Procedure: CHOLECYSTECTOMY, LAPAROSCOPIC;  Surgeon: Annette Lambert MD;  Location:  OR     LAPAROSCOPIC HERNIORRHAPHY INGUINAL BILATERAL Bilateral 10/27/2020    Procedure: LAPAROSCOPIC BILATERAL INGUINAL HERNIA REPAIR WITH MESH;  Surgeon: Brian Garsia MD;  Location:  OR     Prior to Admission Medications   Prior to Admission Medications   Prescriptions Last Dose Informant Patient Reported? Taking?   HUMALOG 100 UNIT/ML injection Unknown Other Yes Yes   Sig: For refilling insulin pump   HYDROcodone-acetaminophen (NORCO) 5-325 MG tablet Unknown Other Yes Yes   Sig: Take 0.5-1 tablets by mouth 2 times daily as needed for severe pain   INSULIN PUMP - OUTPATIENT Unknown Other Yes Yes   Sig: Medtronic device with no CGM and site change every 3 days   Sensitivity factor (midnight to midnight): 55  Bolus (units:gram): 0959-7703 = 1:9, 3093-1154 = 1:7, 9320-0211 = 1:7, 4692-3810 = 1:9, 6737-8662 = 1:13  Basal (units/hour): 3875-6281 = 0.45, 5698-2370 = 0.5, 1529-7772 = 0.625, 1067-8612 = 0.6, 7896-2089 = 0.45   acetaminophen (TYLENOL) 325 MG tablet Unknown Other Yes Yes   Sig: Take 650 mg by mouth every 4 hours as needed for mild pain or fever   amLODIPine (NORVASC) 5 MG tablet 4/19/2023  No No   Sig: Take 1 tablet (5 mg) by mouth 2 times daily   Patient taking differently: Take 5 mg by mouth daily   amLODIPine (NORVASC) 5 MG tablet Unknown Other Yes Yes   Sig: Take 5 mg by mouth daily   carvedilol (COREG) 12.5 MG tablet 4/19/2023 Other No Yes   Sig: Take 1 tablet (12.5 mg) by mouth 2 times  daily (with meals)   cyclobenzaprine (FLEXERIL) 10 MG tablet Unknown Other Yes Yes   Sig: Take 10 mg by mouth nightly as needed for muscle spasms   finasteride (PROSCAR) 5 MG tablet Unknown Pharmacy, Other Yes Yes   Sig: Take 5 mg by mouth daily   glucagon 1 MG kit Unknown Other Yes Yes   Si mg as needed for low blood sugar   insulin glargine (LANTUS PEN) 100 UNIT/ML pen Unknown Other No Yes   Sig: Inject 10 Units Subcutaneous At Bedtime   irbesartan (AVAPRO) 150 MG tablet 2023 Other No Yes   Sig: Take 1 tablet (150 mg) by mouth At Bedtime   lovastatin (MEVACOR) 20 MG tablet Unknown Other Yes Yes   Sig: Take 20 mg by mouth At Bedtime   traMADol (ULTRAM) 50 MG tablet   Yes No   Sig: TAKE 1 1 BY MOUTH AS NEEDED EVERY 8 HOURS FOR 4 DAYS      Facility-Administered Medications: None     Current Facility-Administered Medications   Medication Dose Route Frequency     amLODIPine  5 mg Oral Daily     apixaban ANTICOAGULANT  5 mg Oral BID     carvedilol  12.5 mg Oral BID w/meals     finasteride  5 mg Oral Daily     insulin aspart   Subcutaneous Daily with breakfast     insulin aspart   Subcutaneous Daily with lunch     insulin aspart   Subcutaneous Daily with supper     insulin aspart  1-3 Units Subcutaneous TID AC     insulin glargine  8 Units Subcutaneous Daily with supper     irbesartan  150 mg Oral At Bedtime     tamsulosin  0.4 mg Oral Daily     Current Facility-Administered Medications   Medication Last Rate     - MEDICATION INSTRUCTIONS -       NaCl 100 mL/hr at 23 0939     Allergies   Allergies   Allergen Reactions     No Known Drug Allergy      Social History    reports that he quit smoking about 15 years ago. His smoking use included cigarettes. He started smoking about 55 years ago. He has a 8.00 pack-year smoking history. He has never used smokeless tobacco. He reports that he does not currently use alcohol after a past usage of about 35.0 standard drinks of alcohol per week. He reports that he does  "not currently use drugs.    Family History   I have reviewed this patient's family history and updated it with pertinent information if needed.  Family History   Problem Relation Age of Onset     Family History Negative Mother          age 71     Family History Negative Father          age 70     Diabetes Brother         alive age 77     Diabetes Brother         alive age 76     Family History Negative Brother              Family History Negative Brother              Diabetes Sister         alive age74     Family History Negative Sister          age 86     Heart Disease Sister              Family History Negative Sister              Family History Negative Sister              Diabetes Sister      Diabetes Sister      Diabetes Brother      Diabetes Brother      Review of Systems   A comprehensive review of system was performed and is negative other than that noted in the HPI or here.     Physical Exam   Vital Signs with Ranges  Temp:  [97.7  F (36.5  C)-98.1  F (36.7  C)] 98.1  F (36.7  C)  Pulse:  [56-80] 74  Resp:  [16-18] 16  BP: (130-156)/(77-91) 143/82  SpO2:  [94 %-98 %] 98 %  Wt Readings from Last 4 Encounters:   23 72.6 kg (160 lb)   23 69.3 kg (152 lb 12.8 oz)   23 69.9 kg (154 lb)   23 69.9 kg (154 lb)     I/O last 3 completed shifts:  In: 1140 [P.O.:1140]  Out: 1150 [Urine:1150]    Vitals: BP (!) 143/82 (BP Location: Right arm)   Pulse 74   Temp 98.1  F (36.7  C) (Oral)   Resp 16   Ht 1.753 m (5' 9\")   Wt 72.6 kg (160 lb)   SpO2 98%   BMI 23.63 kg/m      Physical Exam:   General: Appears his stated age, well nourished, and in no acute distress.  Eyes: No scleral icterus.  HEENT: Neck supple. JVP elevated to ~9 cm.  Cardiovascular: Irregularly irregular rhythm, controlled rate. Grade 2/6 systolic murmur heard most prominently at LLSB.  Extremities: Moves all extremities well and symmetrically. Trace non-pitting " edema in the ankles bilaterally.  Respiratory: Breathing non-labored. Lungs with scant crackles in the bases bilaterally, L>R.  Skin: No pallor or cyanosis.  Psych: Appropriate affect. Forgetful.   Neurological: No gross motor neurological focal deficits.    Recent Labs   Lab 04/27/23  0900 04/27/23  0530 04/27/23  0152 04/26/23  2201 04/26/23  1714 04/26/23  1123 04/26/23  1029 04/25/23  0732 04/25/23  0537 04/20/23 2032 04/20/23  1808 04/20/23 1804   WBC  --   --   --   --   --   --  8.7  --  7.7  --   --  11.6*   HGB  --   --   --   --   --   --  12.3*  --  12.2*  --   --  11.8*   MCV  --   --   --   --   --   --  85  --  86  --   --  88   PLT  --   --   --   --   --   --  215  --  193  --   --  238   NA  --   --   --   --  137  137  --  136  --  142   < > 137  --    POTASSIUM  --   --   --   --  4.0  --  3.9  --  3.9   < > 4.5  --    CHLORIDE  --   --   --   --  96*  --  98  --  105   < > 96*  --    CO2  --   --   --   --  26  --  30*  --  29   < > 18*  --    BUN  --   --   --   --  14.6  --  11.6  --  8.1   < > 30.5*  --    CR  --   --   --   --  1.14  --  1.16  --  1.17   < > 1.72*  --    GFRESTIMATED  --   --   --   --  63  --  62  --  61   < > 39*  --    ANIONGAP  --   --   --   --  15  --  8  --  8   < > 23*  --    LLOYD  --   --   --   --  9.2  --  9.0  --  9.3   < > 9.3  --    * 188* 236*   < > 426*   < > 347*   < > 61*   < > 547*  --    ALBUMIN  --   --   --   --   --   --   --   --   --   --  4.3  --    PROTTOTAL  --   --   --   --   --   --   --   --   --   --  6.1*  --    BILITOTAL  --   --   --   --   --   --   --   --   --   --  1.1  --    ALKPHOS  --   --   --   --   --   --   --   --   --   --  92  --    ALT  --   --   --   --   --   --   --   --   --   --  6*  --    AST  --   --   --   --   --   --   --   --   --   --  16  --     < > = values in this interval not displayed.     Recent Labs   Lab Test 01/17/23  0436 05/13/20  0553   CHOL 112 116   HDL 50 60   LDL 52 43   TRIG 51 65      Recent Labs   Lab 04/26/23  1029 04/25/23  0537 04/20/23  1804   WBC 8.7 7.7 11.6*   HGB 12.3* 12.2* 11.8*   HCT 38.2* 37.6* 37.2*   MCV 85 86 88    193 238     Recent Labs   Lab 04/21/23  2335 04/20/23  1808   PHV 7.42 7.31*   PO2V 48* 43   PCO2V 40 40   HCO3V 25 20*     Recent Labs   Lab 04/26/23  2300   DD 0.61*     Recent Labs   Lab 04/26/23  1029 04/25/23  0537 04/20/23  1804    193 238     Imaging:  Recent Results (from the past 48 hour(s))   MR Brain w/o & w Contrast    Narrative    EXAM: MR BRAIN W/O and W CONTRAST  LOCATION: [facility]  DATE/TIME: 4/25/2023 9:34 PM CDT    INDICATION: Interval ICH, evaluate for etiology.  COMPARISON: None.  CONTRAST: 7 mL Gadavist  TECHNIQUE: Routine multiplanar multisequence head MRI without and with intravenous contrast.    FINDINGS:  INTRACRANIAL CONTENTS: No acute or subacute infarct. No mass, acute hemorrhage, or extra-axial fluid collections. Scattered nonspecific T2/FLAIR hyperintensities within the cerebral white matter most consistent with mild chronic microvascular ischemic   change. Susceptibility artifact in the atrium and temporal horn of the right lateral ventricle noted likely related to old hemorrhage arising from the right temporal lobe. Moderate generalized cerebral atrophy. No hydrocephalus. Normal position of the   cerebellar tonsils. No pathologic contrast enhancement.    SELLA: No abnormality accounting for technique.    OSSEOUS STRUCTURES/SOFT TISSUES: Normal marrow signal. The major intracranial vascular flow voids are maintained.     ORBITS: No abnormality accounting for technique.     SINUSES/MASTOIDS: No paranasal sinus mucosal disease. No middle ear or mastoid effusion.       Impression    IMPRESSION:  1.  No acute intracranial process.  2.  Chronic hemorrhage products paralleling the lateral aspect of the right lateral ventricle likely related to the previous hemorrhage arising from the right temporal  lobe.  3.  No recent hemorrhage.  4.  No evidence for enhancing lesion in the right temporal lobe.  5.  Generalized brain atrophy and presumed microvascular ischemic changes as detailed above.   US Lower Extremity Venous Duplex Bilateral    Narrative    VENOUS ULTRASOUND BILATERAL LEG(S)  4/27/2023 8:32 AM     HISTORY: Bilateral lower extremity Swelling + ddimer    COMPARISON: 4/22/2023.    FINDINGS: Examination of the deep veins with graded compression and  color flow Doppler with spectral wave form analysis was performed.  Images show no evidence of thrombus in the bilateral common femoral  vein, femoral vein, popliteal vein or calf veins.      Impression    IMPRESSION: No deep vein thrombosis in either lower extremity.      CHON SARAH DO         SYSTEM ID:  [id number]     Clinically Significant Risk Factors Present on Admission               Fluid & Electrolyte Disorders: Fluid overload, unspecified    Nephrology: CKD POA List: Stage 3a (GFR 45-59)    Neurology: Dementia: Unspecified dementia without behavioral disturbance    Pulmonary Heart Disease (Pulmonary hypertension or Cor pulmonale): Pulmonary Hypertension, unspecified    This note was completed in part using Dragon voice recognition software. Although reviewed after completion, some word and grammatical errors may occur.

## 2023-04-27 NOTE — PROGRESS NOTES
"[facility]  Hospitalist Progress Note    Date of Admission: 4/20/2023    Interval History   Multiple discussions today regarding his care plan. Able to contact his primary endocrinologist. Although situation is not ideal his options are quite limited.   He has extremely labile DM.   Discussion with neurology and patient.  He is going to start Eliquis.  Plans for the patient to go to his diabetes clinic tomorrow at 10:30 AM for restart of his insulin pump.  Spoke with his endocrinologist and will restart pump and continuous glucose monitoring.     Assessment & Plan   Tc Garcia is a 83 year old male with a history of diabetes mellitus type 2, paroxysmal atrial fibrillation, hypertension, hyperlipidemia, pulmonary hypertension, spontaneous intracranial hemorrhage, recurrent pleural effusion, chronic kidney disease stage III, and anemia of chronic disease who presented to the ER with elevated blood sugars.  In the ER he was found to have diabetic ketoacidosis.  He was given IV fluids, potassium, and IV insulin.  The hospitalist service was contacted to admit him for further evaluation management.     Diabetic ketoacidosis  Diabetes mellitus type 1  Brittle/labile blood sugars   Hemoglobin A1c 8.0 on 4/20/2023.  He has an insulin pump at home and has been using it, denies any interruptions or errors.  No recent changes in his diet.  No obvious inciting factor identified thus far.  In the ER he had a glucose of 547, ketones of 3.8, bicarb 18, anion gap 23.    Admit to inpatient.    4/20 admitted under the DKA protocol>> stopped on 4/21    Insulin pump is on hold for now.>  While in the hospital given lability holding on his insulin pump    Prior concerns in Epic in 2/2023 while at [facility] that his DM was brittle. In addition reported cognitive impairment. IDT did not recommend patient return to home and needed a high level of care. The family declined. >> \"dad will not agree to move to [facility]\" \"he " "does what he wants to do\" [facility] report filed at the TCU     Chest x-ray with no new changes.  UA negative and urine culture negative.  No sign of infection.    Given the lability and brittle nature of his diabetes suspect ongoing challenges to diabetes control    4/24 called primary endocrinology clinic.  His primary endocrinologist is concerned about the patient continuing on his insulin pump and wants to stop it.    In agreement the patient should likely be in a different living situation. HE has very brittle DM. At risk of extreme glucose changes and hypoglycemia.    4/24 nursing staff will assess patient's ability to manage subcu insulin which suspect will be very challenging.    4/25 will reduce Lantus to 8 units.  Stop carb counts with sliding scale to simplify his subcu regimen:     4/26 multiple discussions again regarding his insulin pump and discharge planning.  Spoke with his primary endocrinologist in the diabetes clinic    There is a consensus that this is not an ideal situation but options are extremely limited.    His subcu insulin is quite labile in terms of blood sugar management.>>  He has done much better clinically with the pump despite some uncertainty about his subcu insulin management although he seems to have a decent grasp on this in the hospital.    Does not meet criteria for a TCU is he is actually doing too well.  Patient has declined what would be long-term care just so they can do his Accu-Cheks 4 times a day.    Family the,  Patient,  endocrinology all understand his current clinical situation and are attempting to make the best decision under the circumstances and the best safety options for him at home.    He has had the insulin pump since 2019    Plans to resume his pump at home without appointment tomorrow at 10:30 AM to start the pump and go over his continuous glucose monitor.    We will send in home care.    He does have a meter that should notify him if his blood sugar is " critically low as there is an alarm on this     Acute kidney injury  Chronic kidney disease, stage III    Baseline creatinine appears to be around 1.2.  Creatinine up to 1.72 in the ER.    Patient received IV fluids.  His creatinine is elevated.    Known history of retention again noted this admission    Creatinine 1.62 but high PVR with straight cath required    May be post obstructive rather than all dehydration    Creatinine 1.17     Lower extremity edema  Dyspnea on exertion  Has developed lower extremity edema and dyspnea on exertion over the last several days.  No prior history of congestive heart failure.    Echo with EF of 60 to 65%.  Right ventricle moderately dilated.  Moderately severe 3+ tricuspid regurgitation.  Moderate to severe pulmonary hypertension. (pulmonary hypertension on prior ECHO 2021)     4/22 venous dopplers negative of legs.     No complaints of shortness of breath and on RA      Urinary retention    Noted on prior hospital to stay.    Patient required intermittent straight cathing in the past.    Started on Flomax in January 2023    This admission noted again to have urinary retention requiring straight cath.  May be contributing to increase renal function.    Monitor renal function as post obstruction is removed    4/23 Cho catheter placed with recurrent retention >> requested urology consult    Indwelling cho catheter at discharge       Paroxysmal atrial fibrillation    Not on anticoagulation due to recent intracranial hemorrhage.    Continue PTA carvedilol.    Start of eliquis potentially but need to confirm with spine clinic ok to do      History of spontaneous intracranial hemorrhage  Admitted to [facility] in January 2023 due to nontraumatic intraventricular intracerebral hemorrhage which was managed nonoperatively.    Noted.    Patient presented with dizziness, lightheadedness and a new intraventricular hemorrhage.    He was given Kcentra for anticoagulation  reversal.    Had reported history of multiple falls prior to admission but no known head trauma that he reported.    4/24 stroke neurology consult: upon further discussion there appears to be potential unresolved or not clarified follow up assessments to his previous stroke/hemorrhage.     4/26 reconsulted neurology stroke team.  CT and MRI showing no acute stroke.  Old changes from prior hemorrhage.    Previous stroke thought secondary to untreated hypertension with microvascular disease rather than risk of spontaneous bleed.    Recommending with his atrial fibrillation risk that the patient goes on Eliquis.    As noted from therapy he is steady.  Ambulated around the wards with no loss of balance.    He would be discharged on 5 mg twice a day.    His daughter revealed that he had a recent procedure done and Ispine for his back >> not certain if he can have anticoagulation right now      Hypertension  Blood pressure fairly well controlled in the ER.    Continue PTA amlodipine and carvedilol.    Hold PTA irbesartan in setting of acute kidney injury    Monitor BP closely.    Anticipate renal function may improve with straight caths..    As per neurology wtill need to improve /80     Anemia of chronic disease  Hemoglobin 11.8 on 4/20/2023.    Recheck in AM.    Hemoglobin 11.8 on last check and repeat on 4/25     Disposition    Case management involved.    In the past felt unsafe for patient to be at home with even a vulnerable adult report filed.    Interdisciplinary team's at the TCU did not feel he should go home but the family was okay with discharge as they felt the patient requested this    Again concerns about home safety    SLUMS 27/30 4/22 patient insisted that he is going home irregardless of any risk.  He is continued to convey this message. Will try to contact the endocrinology clinic and plan for reattachment of his insulin pump. Also trouble shoot if pump having problems.     4/24 for further  updates from his endocrinologist who is no longer comfortable continuing the insulin pump for Tc.  Concerns that he is too high risk.  We will try to convert him back over to subcu insulin as he has been on this admission.  Currently on insulin per carb count plus sliding scale.  This would be challenging in a facility.  Attempt to get some type of sliding scale more likely.    4/24 initial thoughts that patient qualified for TCU    4/25 did not meet criteria for TCU.  Going back to reevaluate his diabetes control after dialogue this morning.    4/26 rediscussion with his endocrinologist and will start his insulin pump and have follow up appointment 4/27 in the endocrinology clinic     Diet: Moderate Consistent Carb (60 g CHO per Meal) Diet  Snacks/Supplements Adult: Other; snack at 10 pm; Between Meals  Pruett Catheter: PRESENT, indication: Retention  DVT Prophylaxis: compression pneumoboots   Code Status: No CPR- Do NOT Intubate       Expected Discharge Date: 04/28/2023,  3:00 PM    Destination: inpatient rehabilitation facility  Discharge Comments: DKA, MANPREET  SLUMS 27/30 4/26: Pt needs diabetic teaching       SMILEY LUIS MD,   Hospitalist Service  [facility]  ______________________________________________________________________    -Data reviewed today: I reviewed all new labs and imaging results over the last 24 hours. I personally reviewed no images or EKG's today.    Physical Exam   Temp: 97.8  F (36.6  C) Temp src: Oral BP: (!) 149/83 Pulse: 62   Resp: 16 SpO2: 97 % O2 Device: None (Room air)    Vitals:    04/20/23 1801   Weight: 72.6 kg (160 lb)   Constitutional: Patient is in no acute distress.  Respiratory: Breath sounds CTA. No increased work of breathing.  Cardiovascular: RRR, no rub or murmur.   GI: Soft, non-tender, non-distended.  Skin: Warm, dry, no rashes or lesions.  Other: 1-2 + edema   Patient has a Pruett catheter in place    Medications     - MEDICATION INSTRUCTIONS  -         amLODIPine  5 mg Oral Daily     apixaban ANTICOAGULANT  5 mg Oral BID     carvedilol  12.5 mg Oral BID w/meals     finasteride  5 mg Oral Daily     insulin aspart  1-10 Units Subcutaneous TID AC     insulin glargine  8 Units Subcutaneous Daily with supper     irbesartan  150 mg Oral At Bedtime     tamsulosin  0.4 mg Oral Daily       Data   Recent Labs   Lab 04/26/23  1714 04/26/23  1655 04/26/23  1123 04/26/23  1029 04/25/23  0732 04/25/23  0537 04/24/23 2012 04/24/23  1751 04/20/23 2032 04/20/23  1808 04/20/23  1804   WBC  --   --   --  8.7  --  7.7  --   --   --   --  11.6*   HGB  --   --   --  12.3*  --  12.2*  --   --   --   --  11.8*   MCV  --   --   --  85  --  86  --   --   --   --  88   PLT  --   --   --  215  --  193  --   --   --   --  238     --   --  136  --  142  --  138   < > 137  --    POTASSIUM  --   --   --  3.9  --  3.9  --  3.7   < > 4.5  --    CHLORIDE  --   --   --  98  --  105  --  100   < > 96*  --    CO2  --   --   --  30*  --  29  --  28   < > 18*  --    BUN  --   --   --  11.6  --  8.1  --  8.1   < > 30.5*  --    CR  --   --   --  1.16  --  1.17  --  1.08   < > 1.72*  --    ANIONGAP  --   --   --  8  --  8  --  10   < > 23*  --    LLOYD  --   --   --  9.0  --  9.3  --  9.1   < > 9.3  --    GLC  --  360* 305* 347*   < > 61*   < > 236*   < > 547*  --    ALBUMIN  --   --   --   --   --   --   --   --   --  4.3  --    PROTTOTAL  --   --   --   --   --   --   --   --   --  6.1*  --    BILITOTAL  --   --   --   --   --   --   --   --   --  1.1  --    ALKPHOS  --   --   --   --   --   --   --   --   --  92  --    ALT  --   --   --   --   --   --   --   --   --  6*  --    AST  --   --   --   --   --   --   --   --   --  16  --     < > = values in this interval not displayed.       Imaging:  Recent Results (from the past 24 hour(s))   MR Brain w/o & w Contrast    Narrative    EXAM: MR BRAIN W/O and W CONTRAST  LOCATION: [facility]  DATE/TIME: 4/25/2023 9:34  PM CDT    INDICATION: Interval ICH, evaluate for etiology.  COMPARISON: None.  CONTRAST: 7 mL Gadavist  TECHNIQUE: Routine multiplanar multisequence head MRI without and with intravenous contrast.    FINDINGS:  INTRACRANIAL CONTENTS: No acute or subacute infarct. No mass, acute hemorrhage, or extra-axial fluid collections. Scattered nonspecific T2/FLAIR hyperintensities within the cerebral white matter most consistent with mild chronic microvascular ischemic   change. Susceptibility artifact in the atrium and temporal horn of the right lateral ventricle noted likely related to old hemorrhage arising from the right temporal lobe. Moderate generalized cerebral atrophy. No hydrocephalus. Normal position of the   cerebellar tonsils. No pathologic contrast enhancement.    SELLA: No abnormality accounting for technique.    OSSEOUS STRUCTURES/SOFT TISSUES: Normal marrow signal. The major intracranial vascular flow voids are maintained.     ORBITS: No abnormality accounting for technique.     SINUSES/MASTOIDS: No paranasal sinus mucosal disease. No middle ear or mastoid effusion.       Impression    IMPRESSION:  1.  No acute intracranial process.  2.  Chronic hemorrhage products paralleling the lateral aspect of the right lateral ventricle likely related to the previous hemorrhage arising from the right temporal lobe.  3.  No recent hemorrhage.  4.  No evidence for enhancing lesion in the right temporal lobe.  5.  Generalized brain atrophy and presumed microvascular ischemic changes as detailed above.

## 2023-04-27 NOTE — PROGRESS NOTES
ECHO with findings with worsened pulmonary hypertension and RV dilation with TR 3+   Will get ddimer. Venous dopplers negative.   Will likely need to post pone discharge until Friday and hopefully able to reschedule in the clinic.   He does report shortness of breath before admission.   Dr. Lola Julian

## 2023-04-27 NOTE — PLAN OF CARE
VSS on RA. At this time, ECHO to be completed this morning. Plan was to discharge today and for pt to meet with his endocrinologist this morning at 1030 regarding his insulin pump. Per MD note, likely have to postpone discharge until Friday and try to reschedule clinic. US BLE this morning. 0.45NS started last night, 100mL/hr. Seble present r/t retention; will discharge with cho. +1, +2 edema, BLE/lower shins/ankles.

## 2023-04-28 ENCOUNTER — APPOINTMENT (OUTPATIENT)
Dept: OCCUPATIONAL THERAPY | Facility: CLINIC | Age: 83
DRG: 638 | End: 2023-04-28
Payer: COMMERCIAL

## 2023-04-28 ENCOUNTER — TELEPHONE (OUTPATIENT)
Dept: NEUROLOGY | Facility: CLINIC | Age: 83
End: 2023-04-28

## 2023-04-28 LAB
ANION GAP SERPL CALCULATED.3IONS-SCNC: 12 MMOL/L (ref 7–15)
BUN SERPL-MCNC: 15.4 MG/DL (ref 8–23)
CALCIUM SERPL-MCNC: 8.9 MG/DL (ref 8.8–10.2)
CHLORIDE SERPL-SCNC: 99 MMOL/L (ref 98–107)
CREAT SERPL-MCNC: 1.14 MG/DL (ref 0.67–1.17)
DEPRECATED HCO3 PLAS-SCNC: 25 MMOL/L (ref 22–29)
ERYTHROCYTE [DISTWIDTH] IN BLOOD BY AUTOMATED COUNT: 14.7 % (ref 10–15)
GFR SERPL CREATININE-BSD FRML MDRD: 63 ML/MIN/1.73M2
GLUCOSE BLDC GLUCOMTR-MCNC: 259 MG/DL (ref 70–99)
GLUCOSE BLDC GLUCOMTR-MCNC: 267 MG/DL (ref 70–99)
GLUCOSE BLDC GLUCOMTR-MCNC: 314 MG/DL (ref 70–99)
GLUCOSE BLDC GLUCOMTR-MCNC: 314 MG/DL (ref 70–99)
GLUCOSE BLDC GLUCOMTR-MCNC: 317 MG/DL (ref 70–99)
GLUCOSE BLDC GLUCOMTR-MCNC: 328 MG/DL (ref 70–99)
GLUCOSE BLDC GLUCOMTR-MCNC: 348 MG/DL (ref 70–99)
GLUCOSE BLDC GLUCOMTR-MCNC: 413 MG/DL (ref 70–99)
GLUCOSE BLDC GLUCOMTR-MCNC: 434 MG/DL (ref 70–99)
GLUCOSE SERPL-MCNC: 368 MG/DL (ref 70–99)
HCT VFR BLD AUTO: 36.9 % (ref 40–53)
HGB BLD-MCNC: 11.9 G/DL (ref 13.3–17.7)
MCH RBC QN AUTO: 27.2 PG (ref 26.5–33)
MCHC RBC AUTO-ENTMCNC: 32.2 G/DL (ref 31.5–36.5)
MCV RBC AUTO: 84 FL (ref 78–100)
PLATELET # BLD AUTO: 210 10E3/UL (ref 150–450)
POTASSIUM SERPL-SCNC: 4 MMOL/L (ref 3.4–5.3)
RBC # BLD AUTO: 4.37 10E6/UL (ref 4.4–5.9)
SODIUM SERPL-SCNC: 136 MMOL/L (ref 136–145)
WBC # BLD AUTO: 10.5 10E3/UL (ref 4–11)

## 2023-04-28 PROCEDURE — 36415 COLL VENOUS BLD VENIPUNCTURE: CPT | Performed by: INTERNAL MEDICINE

## 2023-04-28 PROCEDURE — 97535 SELF CARE MNGMENT TRAINING: CPT | Mod: GO

## 2023-04-28 PROCEDURE — 250N000013 HC RX MED GY IP 250 OP 250 PS 637: Performed by: NURSE PRACTITIONER

## 2023-04-28 PROCEDURE — 85027 COMPLETE CBC AUTOMATED: CPT | Performed by: INTERNAL MEDICINE

## 2023-04-28 PROCEDURE — 250N000013 HC RX MED GY IP 250 OP 250 PS 637: Performed by: HOSPITALIST

## 2023-04-28 PROCEDURE — 250N000013 HC RX MED GY IP 250 OP 250 PS 637: Performed by: INTERNAL MEDICINE

## 2023-04-28 PROCEDURE — 99233 SBSQ HOSP IP/OBS HIGH 50: CPT | Performed by: INTERNAL MEDICINE

## 2023-04-28 PROCEDURE — 80048 BASIC METABOLIC PNL TOTAL CA: CPT | Performed by: INTERNAL MEDICINE

## 2023-04-28 PROCEDURE — 120N000001 HC R&B MED SURG/OB

## 2023-04-28 PROCEDURE — 250N000013 HC RX MED GY IP 250 OP 250 PS 637

## 2023-04-28 RX ORDER — NICOTINE POLACRILEX 4 MG
15-30 LOZENGE BUCCAL
Status: DISCONTINUED | OUTPATIENT
Start: 2023-04-28 | End: 2023-04-28

## 2023-04-28 RX ORDER — DEXTROSE MONOHYDRATE 25 G/50ML
25-50 INJECTION, SOLUTION INTRAVENOUS
Status: DISCONTINUED | OUTPATIENT
Start: 2023-04-28 | End: 2023-04-28

## 2023-04-28 RX ORDER — NICOTINE POLACRILEX 4 MG
15-30 LOZENGE BUCCAL
Status: DISCONTINUED | OUTPATIENT
Start: 2023-04-28 | End: 2023-05-02

## 2023-04-28 RX ORDER — DEXTROSE MONOHYDRATE 25 G/50ML
25-50 INJECTION, SOLUTION INTRAVENOUS
Status: DISCONTINUED | OUTPATIENT
Start: 2023-04-28 | End: 2023-05-02

## 2023-04-28 RX ADMIN — FINASTERIDE 5 MG: 5 TABLET, FILM COATED ORAL at 09:25

## 2023-04-28 RX ADMIN — AMLODIPINE BESYLATE 5 MG: 5 TABLET ORAL at 09:25

## 2023-04-28 RX ADMIN — IRBESARTAN 150 MG: 150 TABLET ORAL at 22:29

## 2023-04-28 RX ADMIN — APIXABAN 5 MG: 5 TABLET, FILM COATED ORAL at 20:42

## 2023-04-28 RX ADMIN — TAMSULOSIN HYDROCHLORIDE 0.4 MG: 0.4 CAPSULE ORAL at 09:25

## 2023-04-28 RX ADMIN — CARVEDILOL 12.5 MG: 12.5 TABLET, FILM COATED ORAL at 09:25

## 2023-04-28 RX ADMIN — APIXABAN 5 MG: 5 TABLET, FILM COATED ORAL at 09:25

## 2023-04-28 RX ADMIN — SENNOSIDES 2 TABLET: 8.6 TABLET ORAL at 09:25

## 2023-04-28 RX ADMIN — FUROSEMIDE 20 MG: 20 TABLET ORAL at 09:25

## 2023-04-28 RX ADMIN — SENNOSIDES 2 TABLET: 8.6 TABLET ORAL at 20:42

## 2023-04-28 RX ADMIN — CARVEDILOL 12.5 MG: 12.5 TABLET, FILM COATED ORAL at 17:44

## 2023-04-28 ASSESSMENT — ACTIVITIES OF DAILY LIVING (ADL)
ADLS_ACUITY_SCORE: 30

## 2023-04-28 NOTE — PROGRESS NOTES
"CLINICAL NUTRITION SERVICES  -  ASSESSMENT NOTE  Recommendations Ordered by Registered Dietitian (RD):     Updated 10 pm snack to match patient preference   Malnutrition:   % Weight Loss:Weight loss does not meet criteria for malnutrition, dry weight masked by trace edema  % Intake:<75% for >/= 1 month (moderate malnutrition) -- eating less overall in last month  Muscle Loss: Moderate muscle depletion in temporal, interosseus, clavicle and acromion regions  Subcutaneous Fat Loss: mild to moderate fat depletion in orbital/buccal and lower/upper arm regions  Fluid Retention: trace edema    Malnutrition Diagnosis: Moderate malnutrition  In Context of:  Chronic illness or disease with component of Environmental or social circumstances contributing      REASON FOR ASSESSMENT  Tc Garcia is a 83 year old male seen by Registered Dietitian for LOS    History of  diabetes mellitus type 2, paroxysmal atrial fibrillation, hypertension, hyperlipidemia, pulmonary hypertension, spontaneous intracranial hemorrhage, recurrent pleural effusion, chronic kidney disease stage III, and anemia of chronic disease who presented to the ER with elevated blood sugars.  In the ER he was found to have diabetic ketoacidosis.    NUTRITION HISTORY    Information obtained from patient    Food allergies/intolerances: NKFA    Patient is on a regular diet at home.    Typical food/fluid intake PTA: overall decreased intake in last month due to early satiety. Still preparing the same amount of food but not finishing usual portions.    Supplements at home: none    Meal preparation and grocery shopping: patient does himself.    Issues chewing or swallowing: dentures no longer fit, awaiting a new pair.    Food access concerns: denies    Past RD notes: - \"In regards to diabetes, patient has his own pump and does carb counting at home.\"    CURRENT NUTRITION ORDERS    Diet: Orders Placed This Encounter        Moderate Consistent Carb (60 g CHO per Meal) " "Diet     Snack at 10 pm  Current Intake/Tolerance:    Per flow sheet review, variable % intake for documented meals, fair appetite.     Social/environmental factors, concern for ability to manage insulin pump complicating cares this admission    NUTRITION FOCUSED PHYSICAL ASSESSMENT FOR DIAGNOSING MALNUTRITION + PHYSICAL FINDINGS  Completed and Observed:  Yes, visual only  Moderate muscle depletion in temporal, interosseus, clavicle and acromion regions, mild to moderate fat depletion in orbital/buccal and lower/upper arm regions  Obtained from Chart/Interdisciplinary Team    Last BM:     Generalized weakness    Edema: trace in L/R ankle     Temp (24hrs), Av.2  F (36.8  C), Min:98.1  F (36.7  C), Max:98.3  F (36.8  C)     I/O last 3 completed shifts:    In: 2132 [P.O.:780; I.V.:1352]    Out: 3100 [Urine:3100]    ANTHROPOMETRICS  Height: 5' 9\"  Weight: 72 kg   Body mass index is 23.63 kg/m .  Weight Status:  Normal BMI  Weight History: Weight has increased in last 1.5 months, as noted below (edema noted). Patient reports weight fluctuations from 140-160#  Wt Readings from Last 10 Encounters:   23 72.6 kg (160 lb)   23 69.3 kg (152 lb 12.8 oz)   23 69.9 kg (154 lb)   23 69.9 kg (154 lb)   23 69.9 kg (154 lb 1.6 oz)   23 72.1 kg (159 lb)   02/10/23 69.4 kg (153 lb)   23 69.5 kg (153 lb 3.2 oz)   23 68.9 kg (151 lb 12.8 oz)   23 66.7 kg (147 lb 0.8 oz)       ASSESSED NUTRITION NEEDS (PER APPROVED PRACTICE GUIDELINES, Dosing weight: 73 kg):  Estimated Energy Needs: 2146-5435 kcals (25-30 kcal/kg)  Justification: minimum maintenance   Estimated Protein Needs: 73-88 grams protein (1-1.2 g per kg)  Justification: preservation of lean body mass  Estimated Fluid Needs: per provider    LABS  Labs:  Electrolytes  Potassium (mmol/L)   Date Value   2023 4.0   2023 4.0   2023 3.9   2023 4.2   2023 4.1   01/15/2023 3.8   2021 4.0 "   06/21/2021 4.8   06/15/2021 6.0 (A)     Phosphorus (mg/dL)   Date Value   04/21/2023 2.0 (L)   04/20/2023 3.1   06/16/2020 3.5   05/25/2020 3.0   05/24/2020 3.1   05/24/2020 2.6   05/24/2020 2.3 (L)    Blood Glucose  Glucose (mg/dL)   Date Value   04/28/2023 368 (H)   01/17/2023 220 (H)   01/16/2023 431 (H)   01/15/2023 244 (H)   06/08/2022 87   07/12/2021 218 (A)     GLUCOSE BY METER POCT (mg/dL)   Date Value   04/28/2023 328 (H)   04/28/2023 267 (H)   04/28/2023 348 (H)   04/28/2023 314 (H)   04/28/2023 317 (H)     Hemoglobin A1C (%)   Date Value   04/20/2023 8.0 (H)   01/17/2023 7.2 (H)   11/20/2020 7.7 (H)    Inflammatory Markers    CRP Inflammation (mg/L)   Date Value   07/30/2020 18.8 (H)   07/23/2020 28.0 (H)   07/15/2020 67.3 (H)     WBC Count (10e3/uL)   Date Value   04/28/2023 10.5   04/26/2023 8.7   04/25/2023 7.7     Albumin (g/dL)   Date Value   04/20/2023 4.3   02/11/2023 3.8   01/15/2023 4.4   03/22/2021 4.0   03/17/2021 3.9   12/14/2020 4.2   12/14/2020 4.2      Magnesium (mg/dL)   Date Value   04/21/2023 1.9   04/20/2023 2.2   01/16/2023 2.0   05/21/2020 2.0   05/12/2020 1.7   11/07/2019 2.0     Sodium (mmol/L)   Date Value   04/28/2023 136   04/27/2023 135 (L)   04/27/2023 135 (L)   07/12/2021 141   06/21/2021 139   06/15/2021 138    Renal  Urea Nitrogen (mg/dL)   Date Value   04/28/2023 15.4   04/27/2023 15.6   04/27/2023 14.0   01/17/2023 36 (H)   01/16/2023 26     BUN/Creatinine Ratio (no units)   Date Value   07/12/2021 10   06/15/2021 25 (A)     Creatinine (mg/dL)   Date Value   04/28/2023 1.14   04/27/2023 1.15   04/27/2023 1.10   07/12/2021 1.73 (A)     Additional  Triglycerides (mg/dL)   Date Value   01/17/2023 51   05/13/2020 65   03/09/2009 87   10/22/2008 113 @     Ketones Urine (mg/dL)   Date Value   02/11/2023 Negative   03/17/2021 5 (A)        MEDICATIONS    Medications reviewed    amLODIPine  5 mg Oral Daily     apixaban ANTICOAGULANT  5 mg Oral BID     carvedilol  12.5 mg Oral BID  w/meals     finasteride  5 mg Oral Daily     furosemide  20 mg Oral Daily     insulin aspart  1-7 Units Subcutaneous Q4H     insulin aspart   Subcutaneous Daily with breakfast     insulin aspart   Subcutaneous Daily with lunch     insulin aspart   Subcutaneous Daily with supper     insulin glargine  10 Units Subcutaneous At Bedtime     irbesartan  150 mg Oral At Bedtime     sennosides  2 tablet Oral BID     tamsulosin  0.4 mg Oral Daily          - MEDICATION INSTRUCTIONS -       sodium chloride 75 mL/hr at 04/27/23 0743        NUTRITION DIAGNOSIS:  Altered lab value related to brittle DM as evidenced by hyper and hypoglycemia during admission    INTERVENTIONS  Recommendations / Nutrition Prescription  Continue consistent carb diet, HS snack and meal timing per MD    Implementation  Nutrition education: reviewed scheduled meals, snack options, protein push  Diet order: per MD. Updated HS snack option to reflect patient preference.    Goals  Patient to consume >/= 75% of meals TID     MONITORING AND EVALUATION:  Progress towards goals will be monitored and evaluated per protocol and Practice Guidelines      Saundra Wilks, MS, RDN-AP, LD, CNSC

## 2023-04-28 NOTE — PLAN OF CARE
Goal Outcome Evaluation:  Orientations: A&ox4  Vitals/Pain: VSS on RA. Denies pain.   Tele: n/a  Lines/Drains: PIVx1. Cho  Skin/Wounds: Scattered bruising, peeling on bilateral legs.   GI/: Mod carb diet. BG checks q.4h per pharmacy regardless of when patient eats. Adequate UOP via cho. No BM this shift.   Labs: Abnormal/Trends, Electrolyte Replacement- Blood sugars elevated, sliding scale + carb coverage at meals.   Ambulation/Assist: Ax1 GB, walker. Up to chair for meals.   Sleep Quality: Good  Plan: Monitor blood sugars and insulin needs over the weekend. Wife and daughter updated. They will be in this evening. Discharge to home with home care RN, once medically ready. Discharging with tammie. Cardiology follow-up in 1-2 weeks. Room service ordered for pt- meal times at 8a, 12p, 5p per

## 2023-04-28 NOTE — PLAN OF CARE
A&Ox4 . VSS on RA. No Bm this shift, BS active +flatus. Pruett in place for retention w/ AUO.  Ax1 CALVIN/walker. Plan to discharge tomorrow, has an endocrinology appt set for 9:30 am per MD arrangements.

## 2023-04-28 NOTE — PLAN OF CARE
Goal Outcome Evaluation:    A&Ox4 . VSS on RA. Pt did have Bm this shift after suppository and 2 senna. BS active +flatus. Pruett in place for retention w/ AUO.  Ax1 GB/walker. Plan was to discharge today, had an endocrinology appt set for 9:30 am per MD arrangements, however, now Dr. Jon would like to keep him over the weekend to better manage his sugars and discharge to endocrinology appt on Monday, 5/1/23. At bedtime, Dr. Jon ordered 3 units Novolog for BS in the 300's as well as IVF overnight. At 0600, Dr. Jon ordered 7 more units Novolog and stated to recheck BS in a couple hours. BS has been checked every 2 hrs--329, 259, 317, 314, 348. Pt is stable at this time.

## 2023-04-28 NOTE — PROGRESS NOTES
Care Management Follow Up    Length of Stay (days): 8    Expected Discharge Date: 05/01/2023     Concerns to be Addressed:       Patient plan of care discussed at interdisciplinary rounds: Yes    Anticipated Discharge Disposition:  Home w/ HHC     Anticipated Discharge Services:    Anticipated Discharge DME:      Patient/family educated on Medicare website which has current facility and service quality ratings:    Education Provided on the Discharge Plan:    Patient/Family in Agreement with the Plan: yes    Referrals Placed by CM/SW:    Private pay costs discussed: Not applicable    Additional Information:  Updated by MD that patient not discharge today.  Plan to watch BGM's over the weekend.    CC updated Naz, Tala at [facility]   ( [phone]) that patient is not discharging.  CC today.   CC on Monday to follow up with discharge orders.  PT/RN requested disciplines.     CC moved Follow-up appointment with PCP to Monday May 8th at 11:30am.  Updated AVS.    CC will continue to follow for discharge plan.       Sasha Goel RN

## 2023-04-28 NOTE — TELEPHONE ENCOUNTER
Call to schedule 60 Minute Telephone/Video visit return with Melia Schrader PA-C or Any Stroke TAMAR in June 2023 for follow up.    MONICA ALLISON, CMA

## 2023-04-28 NOTE — PLAN OF CARE
Goal Outcome Evaluation:      Plan of Care Reviewed With: patient, spouse, child    Overall Patient Progress: no change      A&Ox4, VSS on RA. A1 w/b, cho in place and will remain in place at discharge. Blood sugars remains elevated- continue with q4h bg checks and insulin along side carb coverage with meals. Possible discharge Monday pending BG improvement.

## 2023-04-29 ENCOUNTER — APPOINTMENT (OUTPATIENT)
Dept: PHYSICAL THERAPY | Facility: CLINIC | Age: 83
DRG: 638 | End: 2023-04-29
Payer: COMMERCIAL

## 2023-04-29 LAB
GLUCOSE BLDC GLUCOMTR-MCNC: 121 MG/DL (ref 70–99)
GLUCOSE BLDC GLUCOMTR-MCNC: 131 MG/DL (ref 70–99)
GLUCOSE BLDC GLUCOMTR-MCNC: 151 MG/DL (ref 70–99)
GLUCOSE BLDC GLUCOMTR-MCNC: 202 MG/DL (ref 70–99)
GLUCOSE BLDC GLUCOMTR-MCNC: 240 MG/DL (ref 70–99)
GLUCOSE BLDC GLUCOMTR-MCNC: 262 MG/DL (ref 70–99)
GLUCOSE BLDC GLUCOMTR-MCNC: 51 MG/DL (ref 70–99)
GLUCOSE BLDC GLUCOMTR-MCNC: 70 MG/DL (ref 70–99)
GLUCOSE BLDC GLUCOMTR-MCNC: 95 MG/DL (ref 70–99)

## 2023-04-29 PROCEDURE — 97112 NEUROMUSCULAR REEDUCATION: CPT | Mod: GP

## 2023-04-29 PROCEDURE — 250N000013 HC RX MED GY IP 250 OP 250 PS 637: Performed by: INTERNAL MEDICINE

## 2023-04-29 PROCEDURE — 120N000001 HC R&B MED SURG/OB

## 2023-04-29 PROCEDURE — 250N000013 HC RX MED GY IP 250 OP 250 PS 637: Performed by: HOSPITALIST

## 2023-04-29 PROCEDURE — 99232 SBSQ HOSP IP/OBS MODERATE 35: CPT | Performed by: INTERNAL MEDICINE

## 2023-04-29 PROCEDURE — 250N000013 HC RX MED GY IP 250 OP 250 PS 637: Performed by: NURSE PRACTITIONER

## 2023-04-29 PROCEDURE — 250N000013 HC RX MED GY IP 250 OP 250 PS 637

## 2023-04-29 PROCEDURE — 97116 GAIT TRAINING THERAPY: CPT | Mod: GP

## 2023-04-29 RX ADMIN — AMLODIPINE BESYLATE 5 MG: 5 TABLET ORAL at 08:43

## 2023-04-29 RX ADMIN — TAMSULOSIN HYDROCHLORIDE 0.4 MG: 0.4 CAPSULE ORAL at 08:42

## 2023-04-29 RX ADMIN — FINASTERIDE 5 MG: 5 TABLET, FILM COATED ORAL at 08:42

## 2023-04-29 RX ADMIN — FUROSEMIDE 20 MG: 20 TABLET ORAL at 08:43

## 2023-04-29 RX ADMIN — APIXABAN 5 MG: 5 TABLET, FILM COATED ORAL at 21:33

## 2023-04-29 RX ADMIN — CARVEDILOL 12.5 MG: 12.5 TABLET, FILM COATED ORAL at 08:43

## 2023-04-29 RX ADMIN — APIXABAN 5 MG: 5 TABLET, FILM COATED ORAL at 08:43

## 2023-04-29 RX ADMIN — SENNOSIDES 2 TABLET: 8.6 TABLET ORAL at 08:42

## 2023-04-29 RX ADMIN — CARVEDILOL 12.5 MG: 12.5 TABLET, FILM COATED ORAL at 18:58

## 2023-04-29 RX ADMIN — IRBESARTAN 150 MG: 150 TABLET ORAL at 21:33

## 2023-04-29 ASSESSMENT — ACTIVITIES OF DAILY LIVING (ADL)
ADLS_ACUITY_SCORE: 30

## 2023-04-29 NOTE — PLAN OF CARE
Care plan note:      Recent Vitals:  Temp: 98  F (36.7  C) Temp src: Oral BP: (!) 140/79 Pulse: 75   Resp: 18 SpO2: 100 % O2 Device: None (Room air)      Orientation/Neuro: Alert and Oriented x 4  Pain: The patient is not having any pain.   Tele: NA.   IV medications: None   Mobility: St. by assist   Skin: With in normal limits   GI: WDL  : Pruett Catheter     Diet: Tolerating diet:   Well  Orders Placed This Encounter      Moderate Consistent Carb (60 g CHO per Meal) Diet      Safety/Concerns:  Fall Risk  Aggression Color: Green    Plan: Blood glucose level have been low normal range, good appetite. Continue close monitoring of glucose level. No glucose coverage at bedtime.  Since pt tend to run low middle of the night.   Continue to monitor.      Jana Landeros RN

## 2023-04-29 NOTE — PLAN OF CARE
Goal Outcome Evaluation:       Patient A&Ox4, VSS on RA. Denies pain.  Ax1 G/W. Pruett in place, adequate UO. BM x1.  Blood sugar 51 @ 4am, after oral intake repeat 70, 131. Tolerates well mod carb diet. Plan to monitor blood sugars and possible discharge on Monday.

## 2023-04-29 NOTE — PROGRESS NOTES
[facility]  Hospitalist Progress Note    Date of Admission: 4/20/2023    Interval History   Patient with significant elevation in blood sugars which appears to be new.  He was extremely labile early in the hospital course and in fact hypoglycemic.  He is on close to potential insulin pump levels but has had an exponential increase in his glucose  Now on prandial plus every 4 hours sliding scale  His family was here today.  Discussion with his daughter and she and her  are in a try to go to his diabetes visit this week so they can help with the technology regarding blood sugar control with free style.     Assessment & Plan   Tc Garcia is a 83 year old male with a history of diabetes mellitus type 2, paroxysmal atrial fibrillation, hypertension, hyperlipidemia, pulmonary hypertension, spontaneous intracranial hemorrhage, recurrent pleural effusion, chronic kidney disease stage III, and anemia of chronic disease who presented to the ER with elevated blood sugars.  In the ER he was found to have diabetic ketoacidosis.  He was given IV fluids, potassium, and IV insulin.  The hospitalist service was contacted to admit him for further evaluation management.     Diabetic ketoacidosis  Diabetes mellitus type 1  Brittle/labile blood sugars   Hemoglobin A1c 8.0 on 4/20/2023.  He has an insulin pump at home and has been using it, denies any interruptions or errors.  No recent changes in his diet.  No obvious inciting factor identified thus far.  In the ER he had a glucose of 547, ketones of 3.8, bicarb 18, anion gap 23.    Admit to inpatient.    4/20 admitted under the DKA protocol>> stopped on 4/21    Insulin pump is on hold for now.>  While in the hospital given lability holding on his insulin pump    Prior concerns in Epic in 2/2023 while at [facility] that his DM was brittle. In addition reported cognitive impairment. IDT did not recommend patient return to home and needed a high level of care.  "The family declined. >> \"dad will not agree to move to [place]\" \"he does what he wants to do\" [facility] report filed at the TCU     Chest x-ray with no new changes.  UA negative and urine culture negative.  No sign of infection.    Given the lability and brittle nature of his diabetes suspect ongoing challenges to diabetes control    Multiple dialogues this hospital stay regarding he has discharged home and concerns    He has extremely labile blood sugars early in the week being almost hypoglycemic now with significant elevation in blood sugars??    Exact cause of why now it is going up is not entirely clear but have made adjustments in his insulin    He is still on a diabetic diet.  There is no additional intake of carbs or sugar that were aware    Increase in Lantus to 12 units and carbohydrate count 1:10 and on medium sliding scale q 4 hours.     Does not appear to have any overt signs of infection.  No fevers.      Continue with current regimen to avoid an insulin drip at this point     Acute kidney injury  Chronic kidney disease, stage III    Baseline creatinine appears to be around 1.2.  Creatinine up to 1.72 in the ER.    Patient received IV fluids.  His creatinine is elevated.    Known history of retention again noted this admission    Creatinine 1.62 but high PVR with straight cath required    May be post obstructive rather than all dehydration    Creatinine 1.17     Lower extremity edema  Dyspnea on exertion  Has developed lower extremity edema and dyspnea on exertion over the last several days.  No prior history of congestive heart failure.    Echo with EF of 60 to 65%.  Right ventricle moderately dilated.  Moderately severe 3+ tricuspid regurgitation.  Moderate to severe pulmonary hypertension. (pulmonary hypertension on prior ECHO 2021)     4/22 venous dopplers negative of legs.     No complaints of shortness of breath and on RA     4/27 cardiology consult >> Increase in TR and RV dilation    V/Q no PE and " ultrasound no DVT    Likely fluid overload per cardiology with Right sided higher pressures and edeam in legs.     Discharge with lasix 20 mg po daily     4/28 BMP stable.  His bicarb is normal still and anion gap     Urinary retention    Noted on prior hospital to stay.    Patient required intermittent straight cathing in the past.    Started on Flomax in January 2023    This admission noted again to have urinary retention requiring straight cath.  May be contributing to increase renal function.    Monitor renal function as post obstruction is removed    4/23 Cho catheter placed with recurrent retention >> requested urology consult    Indwelling cho catheter at discharge       Paroxysmal atrial fibrillation    Not on anticoagulation due to recent intracranial hemorrhage.    Continue PTA carvedilol.    Patient started on Eliquis 5 mg twice daily as per discussion with neurology and cardiology     History of spontaneous intracranial hemorrhage  Admitted to [facility] in January 2023 due to nontraumatic intraventricular intracerebral hemorrhage which was managed nonoperatively.    Noted.    Patient presented with dizziness, lightheadedness and a new intraventricular hemorrhage.    He was given Kcentra for anticoagulation reversal.    Had reported history of multiple falls prior to admission but no known head trauma that he reported.    4/24 stroke neurology consult: upon further discussion there appears to be potential unresolved or not clarified follow up assessments to his previous stroke/hemorrhage.     4/26 reconsulted neurology stroke team.  CT and MRI showing no acute stroke.  Old changes from prior hemorrhage.    Previous stroke thought secondary to untreated hypertension with microvascular disease rather than risk of spontaneous bleed.    Recommending with his atrial fibrillation risk that the patient goes on Eliquis.    As noted from therapy he is steady.  Ambulated around the wards with no loss  of balance.    Currently started on Eliquis 5 mg p.o. twice daily.  He has been stable on his feet thus far     Hypertension  Blood pressure fairly well controlled in the ER.    Continue PTA amlodipine and carvedilol.    Hold PTA irbesartan in setting of acute kidney injury    Monitor BP closely.    Anticipate renal function may improve with straight caths..    As per neurology wtill need to improve /80     Anemia of chronic disease  Hemoglobin 11.8 on 4/20/2023.    Recheck in AM.    Hemoglobin 11.8 on last check and repeat on 4/25 4/28 hgb 11.9      Disposition    Case management involved.    In the past felt unsafe for patient to be at home with even a vulnerable adult report filed.    Interdisciplinary team's at the TCU did not feel he should go home but the family was okay with discharge as they felt the patient requested this    Again concerns about home safety    SLUMS 27/30 4/22 patient insisted that he is going home irregardless of any risk.  He is continued to convey this message. Will try to contact the endocrinology clinic and plan for reattachment of his insulin pump. Also trouble shoot if pump having problems.     4/24 for further updates from his endocrinologist who is no longer comfortable continuing the insulin pump for Tc.  Concerns that he is too high risk.  We will try to convert him back over to subcu insulin as he has been on this admission.  Currently on insulin per carb count plus sliding scale.  This would be challenging in a facility.  Attempt to get some type of sliding scale more likely.    4/24 initial thoughts that patient qualified for TCU    4/25 did not meet criteria for TCU.  Going back to reevaluate his diabetes control after dialogue this morning.    4/26 rediscussion with his endocrinologist and will start his insulin pump and have follow up appointment 4/28 in the endocrinology clinic     4/28 family present and they will go with him to appointment and try to help  with the free style lan and connect to his phone     Diet: Moderate Consistent Carb (60 g CHO per Meal) Diet  Room Service  Snacks/Supplements Adult: Other; snack at 10 pm; Between Meals  Pruett Catheter: PRESENT, indication: Retention  DVT Prophylaxis: compression pneumoboots   Code Status: No CPR- Do NOT Intubate       Expected Discharge Date: 05/01/2023,  9:00 AM    Destination: inpatient rehabilitation facility  Discharge Comments: DKA, MANPREET  SLUMS 27/30 4/26: Pt needs diabetic teaching  4/28: needs better BG control       SMILEY LUIS MD,   Hospitalist Service  [facility]  ______________________________________________________________________    -Data reviewed today: I reviewed all new labs and imaging results over the last 24 hours. I personally reviewed no images or EKG's today.    Physical Exam   Temp: 97.9  F (36.6  C) Temp src: Oral BP: 136/73 Pulse: 70   Resp: 18 SpO2: 100 % O2 Device: None (Room air)    Vitals:    04/20/23 1801   Weight: 72.6 kg (160 lb)   Constitutional: Patient is in no acute distress.  Respiratory: Breath sounds CTA. No increased work of breathing.  Cardiovascular: RRR, no rub or murmur.   GI: Soft, non-tender, non-distended.  Skin: Warm, dry, no rashes or lesions.  Other: 1-2 + edema   Patient has a Pruett catheter in place    Medications     - MEDICATION INSTRUCTIONS -       sodium chloride 75 mL/hr at 04/27/23 2336       amLODIPine  5 mg Oral Daily     apixaban ANTICOAGULANT  5 mg Oral BID     carvedilol  12.5 mg Oral BID w/meals     finasteride  5 mg Oral Daily     furosemide  20 mg Oral Daily     insulin aspart  1-7 Units Subcutaneous Q4H     insulin aspart   Subcutaneous Daily with breakfast     insulin aspart   Subcutaneous Daily with lunch     insulin aspart   Subcutaneous Daily with supper     insulin glargine  12 Units Subcutaneous Daily with supper     irbesartan  150 mg Oral At Bedtime     sennosides  2 tablet Oral BID     tamsulosin  0.4 mg  Oral Daily       Data   Recent Labs   Lab 04/28/23  1711 04/28/23  1607 04/28/23  1208 04/28/23  0557 04/28/23  0550 04/28/23  0225 04/27/23  2243 04/27/23  1618 04/27/23  1420 04/26/23  1123 04/26/23  1029 04/25/23  0732 04/25/23  0537   WBC  --   --   --   --  10.5  --   --   --   --   --  8.7  --  7.7   HGB  --   --   --   --  11.9*  --   --   --   --   --  12.3*  --  12.2*   MCV  --   --   --   --  84  --   --   --   --   --  85  --  86   PLT  --   --   --   --  210  --   --   --   --   --  215  --  193   NA  --   --   --   --  136  --  135*  --  135*   < > 136  --  142   POTASSIUM  --   --   --   --  4.0  --  4.0  --  3.9   < > 3.9  --  3.9   CHLORIDE  --   --   --   --  99  --  98  --  96*   < > 98  --  105   CO2  --   --   --   --  25  --  27  --  25   < > 30*  --  29   BUN  --   --   --   --  15.4  --  15.6  --  14.0   < > 11.6  --  8.1   CR  --   --   --   --  1.14  --  1.15  --  1.10   < > 1.16  --  1.17   ANIONGAP  --   --   --   --  12  --  10  --  14   < > 8  --  8   LLOYD  --   --   --   --  8.9  --  8.9  --  8.9   < > 9.0  --  9.3   * 434* 328*   < > 368*   < > 358*   < > 389*   < > 347*   < > 61*    < > = values in this interval not displayed.       Imaging:  No results found for this or any previous visit (from the past 24 hour(s)).

## 2023-04-30 ENCOUNTER — APPOINTMENT (OUTPATIENT)
Dept: PHYSICAL THERAPY | Facility: CLINIC | Age: 83
DRG: 638 | End: 2023-04-30
Payer: COMMERCIAL

## 2023-04-30 LAB
ANION GAP SERPL CALCULATED.3IONS-SCNC: 8 MMOL/L (ref 7–15)
BUN SERPL-MCNC: 12.3 MG/DL (ref 8–23)
CALCIUM SERPL-MCNC: 9 MG/DL (ref 8.8–10.2)
CHLORIDE SERPL-SCNC: 101 MMOL/L (ref 98–107)
CREAT SERPL-MCNC: 1.16 MG/DL (ref 0.67–1.17)
DEPRECATED HCO3 PLAS-SCNC: 29 MMOL/L (ref 22–29)
GFR SERPL CREATININE-BSD FRML MDRD: 62 ML/MIN/1.73M2
GLUCOSE BLDC GLUCOMTR-MCNC: 112 MG/DL (ref 70–99)
GLUCOSE BLDC GLUCOMTR-MCNC: 158 MG/DL (ref 70–99)
GLUCOSE BLDC GLUCOMTR-MCNC: 173 MG/DL (ref 70–99)
GLUCOSE BLDC GLUCOMTR-MCNC: 202 MG/DL (ref 70–99)
GLUCOSE BLDC GLUCOMTR-MCNC: 214 MG/DL (ref 70–99)
GLUCOSE BLDC GLUCOMTR-MCNC: 228 MG/DL (ref 70–99)
GLUCOSE BLDC GLUCOMTR-MCNC: 262 MG/DL (ref 70–99)
GLUCOSE BLDC GLUCOMTR-MCNC: 80 MG/DL (ref 70–99)
GLUCOSE SERPL-MCNC: 82 MG/DL (ref 70–99)
GLUCOSE SERPL-MCNC: 82 MG/DL (ref 70–99)
POTASSIUM SERPL-SCNC: 3.2 MMOL/L (ref 3.4–5.3)
SODIUM SERPL-SCNC: 138 MMOL/L (ref 136–145)

## 2023-04-30 PROCEDURE — 250N000013 HC RX MED GY IP 250 OP 250 PS 637

## 2023-04-30 PROCEDURE — 250N000013 HC RX MED GY IP 250 OP 250 PS 637: Performed by: HOSPITALIST

## 2023-04-30 PROCEDURE — 36415 COLL VENOUS BLD VENIPUNCTURE: CPT | Performed by: INTERNAL MEDICINE

## 2023-04-30 PROCEDURE — 250N000013 HC RX MED GY IP 250 OP 250 PS 637: Performed by: NURSE PRACTITIONER

## 2023-04-30 PROCEDURE — 97116 GAIT TRAINING THERAPY: CPT | Mod: GP

## 2023-04-30 PROCEDURE — 84145 PROCALCITONIN (PCT): CPT | Performed by: INTERNAL MEDICINE

## 2023-04-30 PROCEDURE — 82310 ASSAY OF CALCIUM: CPT | Performed by: INTERNAL MEDICINE

## 2023-04-30 PROCEDURE — 99232 SBSQ HOSP IP/OBS MODERATE 35: CPT | Performed by: INTERNAL MEDICINE

## 2023-04-30 PROCEDURE — 120N000001 HC R&B MED SURG/OB

## 2023-04-30 PROCEDURE — 82374 ASSAY BLOOD CARBON DIOXIDE: CPT | Performed by: INTERNAL MEDICINE

## 2023-04-30 PROCEDURE — 250N000013 HC RX MED GY IP 250 OP 250 PS 637: Performed by: INTERNAL MEDICINE

## 2023-04-30 RX ORDER — POTASSIUM CHLORIDE 1.5 G/1.58G
20 POWDER, FOR SOLUTION ORAL 2 TIMES DAILY
Status: DISCONTINUED | OUTPATIENT
Start: 2023-04-30 | End: 2023-04-30

## 2023-04-30 RX ORDER — POTASSIUM CHLORIDE 1500 MG/1
40 TABLET, EXTENDED RELEASE ORAL ONCE
Status: COMPLETED | OUTPATIENT
Start: 2023-04-30 | End: 2023-04-30

## 2023-04-30 RX ADMIN — IRBESARTAN 150 MG: 150 TABLET ORAL at 22:03

## 2023-04-30 RX ADMIN — SENNOSIDES 2 TABLET: 8.6 TABLET ORAL at 22:04

## 2023-04-30 RX ADMIN — CARVEDILOL 12.5 MG: 12.5 TABLET, FILM COATED ORAL at 17:23

## 2023-04-30 RX ADMIN — POTASSIUM CHLORIDE 40 MEQ: 1500 TABLET, EXTENDED RELEASE ORAL at 19:39

## 2023-04-30 RX ADMIN — AMLODIPINE BESYLATE 5 MG: 5 TABLET ORAL at 09:14

## 2023-04-30 RX ADMIN — SENNOSIDES 2 TABLET: 8.6 TABLET ORAL at 09:13

## 2023-04-30 RX ADMIN — APIXABAN 5 MG: 5 TABLET, FILM COATED ORAL at 22:03

## 2023-04-30 RX ADMIN — FINASTERIDE 5 MG: 5 TABLET, FILM COATED ORAL at 09:13

## 2023-04-30 RX ADMIN — CARVEDILOL 12.5 MG: 12.5 TABLET, FILM COATED ORAL at 09:14

## 2023-04-30 RX ADMIN — FUROSEMIDE 20 MG: 20 TABLET ORAL at 09:13

## 2023-04-30 RX ADMIN — TAMSULOSIN HYDROCHLORIDE 0.4 MG: 0.4 CAPSULE ORAL at 09:13

## 2023-04-30 RX ADMIN — APIXABAN 5 MG: 5 TABLET, FILM COATED ORAL at 09:13

## 2023-04-30 ASSESSMENT — ACTIVITIES OF DAILY LIVING (ADL)
ADLS_ACUITY_SCORE: 25
ADLS_ACUITY_SCORE: 25
ADLS_ACUITY_SCORE: 30
ADLS_ACUITY_SCORE: 25
ADLS_ACUITY_SCORE: 30
ADLS_ACUITY_SCORE: 30
ADLS_ACUITY_SCORE: 25
ADLS_ACUITY_SCORE: 25
ADLS_ACUITY_SCORE: 30
ADLS_ACUITY_SCORE: 25

## 2023-04-30 NOTE — PROVIDER NOTIFICATION
MD Notification    Notified Person: MD    Notified Person Name: SMILEY LUIS     Notification Date/Time: 4/20/23 4:13 pm    Notification Interaction: Paged    Purpose of Notification: Still has IMC orders, 1) Q2 blood sug NOC 2) Q1 vitls 3) Q4 neuros ALSO 1) K+ 3.2 want protocol? 2) Still want us to give 5 pm lantus?      Orders Received: Yes    Comments:

## 2023-04-30 NOTE — PROGRESS NOTES
Practiced carb coverage and self-administration of insulin pen with patient. Patient was able to accurately calculate the carbs they consumed and how many units they needed for coverage. Patient was given initial instruction but needed several reminders on how to clean pen, apply pen needle, and prime the pen; patient was able to turn dial to the appropriate dose. Finally, patient did not have enough strength to push pen down and press injector button for administration. Some of the dose of lantus leaked out after administration and writer stopped patient from injecting more insulin.

## 2023-04-30 NOTE — PLAN OF CARE
Goal Outcome Evaluation:       A&OX4. VSS on room air. Blood sugar check, was 262 before supper, covered per order. Q2h blood sugar checks overnight due to hx of hypoglycemia overnight. Pt only wants one cup of juice at a time if low, give if bg below 100 per MD, incoming shift notified regarding this. Up AO1/SBA with GBW. Denies pain, dizziness, and nausea. Pleasant pt. Pruett in place and intact. Discharge pending.

## 2023-04-30 NOTE — PLAN OF CARE
Mental Status: A&O x4.  Activity/dangle: Asst of 1 GB/W.  Diet: Tolerating Mod Carb diet.  Pain: Darrick pain.  Pruett/Voiding: Pruett in patent.  02/LDA: Room air. IV saline locked.  D/C Date: Possible discharge 5/1/2023.  Other Info: Blood glucose check.

## 2023-04-30 NOTE — PROGRESS NOTES
Medical    Diagnosis:DKA  Mental Status:AxOx4 forgetful  Activity/dangle: SBA with gait belt   Diet:Mod carb 60 g  Pain:Denies  Pruett/Voiding:Pruett with adequate output  02/LDA:Left arm PIV S/L  D/C Date:TBD  Other Info: Giving insulin both for pre-meal coverage and carb coverage together after patient is done eating; also having patient properly demonstrate self-injection

## 2023-04-30 NOTE — PLAN OF CARE
Date/Time: 4/ 2300-730 AM  Diagnosis:DKA  Mental Status: A/Ox4  Activity/dangle:SBA  Diet: carb count  Pain: Denies  Cho/Voiding:Continent on B/B;cho in place for retention  Tele/Restraints/Iso:NA  Skin: NA  02/LDA: VSS on RA, PIV patent/SL  D/C Date: Possibly Monday  Other Info: BG Q2;202,158 and 112.Will continue to monitor   Summary:

## 2023-04-30 NOTE — PROGRESS NOTES
"[facility]  Hospitalist Progress Note    Date of Admission: 4/20/2023    Interval History    Patient improved blood sugars overnight.  Even running 300 and 400s yesterday 4/28 for not entirely clear reason.   He was frustrated this am given his low glucose overnight. He felt that he was not sure how decisions were made regarding his juice or correction of his insulin.   He wants to know his glucose and his insulin numbers   Trying to get ready for discharge on Monday.        Assessment & Plan   Tc Garcia is a 83 year old male with a history of diabetes mellitus type 2, paroxysmal atrial fibrillation, hypertension, hyperlipidemia, pulmonary hypertension, spontaneous intracranial hemorrhage, recurrent pleural effusion, chronic kidney disease stage III, and anemia of chronic disease who presented to the ER with elevated blood sugars.  In the ER he was found to have diabetic ketoacidosis.  He was given IV fluids, potassium, and IV insulin.  The hospitalist service was contacted to admit him for further evaluation management.     Diabetic ketoacidosis  Diabetes mellitus type 1  Brittle/labile blood sugars   Hemoglobin A1c 8.0 on 4/20/2023.  He has an insulin pump at home and has been using it, denied any interruptions or errors.  Later determined that he had procedure with kyphoplasty on 4/10 and did not eat most of the day No other recent changes in his diet  In the ER he had a glucose of 547, ketones of 3.8, bicarb 18, anion gap 23.    Admit to inpatient.    4/20 admitted under the DKA protocol>> stopped on 4/21    Insulin pump is on hold for now.>  While in the hospital given lability holding on his insulin pump    Prior concerns in Epic in 2/2023 while at [facility] that his DM was brittle. In addition reported cognitive impairment. IDT did not recommend patient return to home and needed a high level of care. The family declined. >> \"dad will not agree to move to custodial\" \"he does what he wants to " "do\" [facility] report filed at the TCU     Chest x-ray with no new changes.  UA negative and urine culture negative.  No sign of infection.    Given the lability and brittle nature of his diabetes suspect ongoing challenges to diabetes control    Multiple dialogues this hospital stay regarding he has discharged home and concerns    4/29 experience significant increase in glucose on 4/28 running 300-400 and required multiple adjustments with every 4 hour sliding scale and carb counts.  He then was hypoglycemic overnight which was potentially anticipated given his history.    4/29 blood sugars appear to be back on track: Baseline lability.    Lantus 12 units at at bedtime.    Carb counting 1-10 for breakfast and lunch and 1-15 for supper    Medium sliding scale but no bedtime sliding scale    Historically his evening tonight sugars run slightly high but if covered he gets hypoglycemic in the night    Placed on every 2 hours Accu-Cheks from midnight to 6 AM review historically is at higher risk    Check BMP in am      Acute kidney injury  Chronic kidney disease, stage III    Baseline creatinine appears to be around 1.2.  Creatinine up to 1.72 in the ER.    Patient received IV fluids.  His creatinine is elevated.    Known history of retention again noted this admission    Creatinine 1.62 but high PVR with straight cath required    May be post obstructive rather than all dehydration    Creatinine 1.14 stable      Lower extremity edema  Dyspnea on exertion  RV dilation and pulmonary hypertension/TR 3+   Has developed lower extremity edema and dyspnea on exertion over the last several days.  No prior history of congestive heart failure.    Echo with EF of 60 to 65%.  Right ventricle moderately dilated.  Moderately severe 3+ tricuspid regurgitation.  Moderate to severe pulmonary hypertension. (pulmonary hypertension on prior ECHO 2021)     4/22 venous dopplers negative of legs.     4/27 cardiology consult >> Increase in TR and RV " dilation rec diuretics     4/27 V/Q no PE and ultrasound no DVT    Likely fluid overload per cardiology with Right sided higher pressures and edeam in legs.     Discharge with lasix 20 mg po daily     4/28 BMP stable.      Urinary retention    Noted on prior hospital to stay.    Patient required intermittent straight cathing in the past.    Started on Flomax in January 2023    This admission noted again to have urinary retention requiring straight cath.  May be contributing to increase renal function.    Monitor renal function as post obstruction is removed    4/23 Cho catheter placed with recurrent retention >> requested urology consult    Indwelling cho catheter at discharge       Paroxysmal atrial fibrillation    Not on anticoagulation due to recent intracranial hemorrhage.    Continue PTA carvedilol.    Patient started on Eliquis 5 mg twice daily as per discussion with neurology and cardiology     History of spontaneous intracranial hemorrhage  Admitted to [facility] in January 2023 due to nontraumatic intraventricular intracerebral hemorrhage which was managed nonoperatively.    Noted.    Patient presented with dizziness, lightheadedness and a new intraventricular hemorrhage.    He was given Kcentra for anticoagulation reversal.    Had reported history of multiple falls prior to admission but no known head trauma that he reported.    4/24 stroke neurology consult: upon further discussion there appears to be potential unresolved or not clarified follow up assessments to his previous stroke/hemorrhage.     4/26 reconsulted neurology stroke team.  CT and MRI showing no acute stroke.  Old changes from prior hemorrhage.    Previous stroke thought secondary to untreated hypertension with microvascular disease rather than risk of spontaneous bleed.    Recommending with his atrial fibrillation risk that the patient goes on Eliquis.    As noted from therapy he is steady.  Ambulated around the wards with no  loss of balance.    Currently started on Eliquis 5 mg p.o. twice daily.  He has been stable on his feet thus far     Hypertension  Blood pressure fairly well controlled in the ER.    Continue PTA amlodipine and carvedilol.    Hold PTA irbesartan in setting of acute kidney injury    Monitor BP closely.    Anticipate renal function may improve with straight caths..    As per neurology wtill need to improve /80     Anemia of chronic disease  Hemoglobin 11.8 on 4/20/2023.    Recheck in AM.    Hemoglobin 11.8 on last check and repeat on 4/25 4/28 hgb 11.9      Disposition    Case management involved.    In the past felt unsafe for patient to be at home with even a vulnerable adult report filed.    Interdisciplinary team's at the TCU did not feel he should go home but the family was okay with discharge as they felt the patient requested this    Again concerns about home safety    SLUMS 27/30 4/22 patient insisted that he is going home irregardless of any risk.  He is continued to convey this message. Will try to contact the endocrinology clinic and plan for reattachment of his insulin pump. Also trouble shoot if pump having problems.     4/24 for further updates from his endocrinologist who is no longer comfortable continuing the insulin pump for Tc.  Concerns that he is too high risk.  We will try to convert him back over to subcu insulin as he has been on this admission.  Currently on insulin per carb count plus sliding scale.  This would be challenging in a facility.  Attempt to get some type of sliding scale more likely.    4/24 initial thoughts that patient qualified for TCU    4/25 did not meet criteria for TCU.  Going back to reevaluate his diabetes control after dialogue this morning.    4/26 rediscussion with his endocrinologist and will start his insulin pump and have follow up appointment 4/28 in the endocrinology clinic     4/28 family present and they will go with him to appointment and try to  help with the free style lan and connect to his phone     Diet: Moderate Consistent Carb (60 g CHO per Meal) Diet  Room Service  Snacks/Supplements Adult: Other; snack at 10 pm; Between Meals  Pruett Catheter: PRESENT, indication: Retention  DVT Prophylaxis: compression pneumoboots   Code Status: No CPR- Do NOT Intubate       Expected Discharge Date: 05/01/2023,  9:00 AM    Destination: inpatient rehabilitation facility  Discharge Comments: DKA, MANPREET  SLUMS 27/30 4/26: Pt needs diabetic teaching  4/28: needs better BG control       SMILEY LUIS MD,   Hospitalist Service  [facility]  ______________________________________________________________________    -Data reviewed today: I reviewed all new labs and imaging results over the last 24 hours. I personally reviewed no images or EKG's today.    Physical Exam   Temp: 98.1  F (36.7  C) Temp src: Oral BP: 122/70 Pulse: 75   Resp: 16 SpO2: 99 % O2 Device: None (Room air)    Vitals:    04/20/23 1801   Weight: 72.6 kg (160 lb)   Constitutional: Patient is in no acute distress.  Respiratory: Breath sounds CTA. No increased work of breathing.  Cardiovascular: RRR, no rub or murmur.   GI: Soft, non-tender, non-distended.  Skin: Warm, dry, no rashes or lesions.  Other: 1-2 + edema   Patient has a Pruett catheter in place    Medications     - MEDICATION INSTRUCTIONS -       sodium chloride 75 mL/hr at 04/27/23 4076       amLODIPine  5 mg Oral Daily     apixaban ANTICOAGULANT  5 mg Oral BID     carvedilol  12.5 mg Oral BID w/meals     finasteride  5 mg Oral Daily     furosemide  20 mg Oral Daily     insulin aspart  1-7 Units Subcutaneous TID w/meals     insulin aspart   Subcutaneous Daily with breakfast     insulin aspart   Subcutaneous Daily with lunch     insulin aspart   Subcutaneous Daily with supper     insulin glargine  12 Units Subcutaneous Daily with supper     irbesartan  150 mg Oral At Bedtime     sennosides  2 tablet Oral BID     tamsulosin   0.4 mg Oral Daily       Data   Recent Labs   Lab 04/29/23  1734 04/29/23  1655 04/29/23  1405 04/28/23  0557 04/28/23  0550 04/28/23  0225 04/27/23  2243 04/27/23  1618 04/27/23  1420 04/26/23  1123 04/26/23  1029 04/25/23  0732 04/25/23  0537   WBC  --   --   --   --  10.5  --   --   --   --   --  8.7  --  7.7   HGB  --   --   --   --  11.9*  --   --   --   --   --  12.3*  --  12.2*   MCV  --   --   --   --  84  --   --   --   --   --  85  --  86   PLT  --   --   --   --  210  --   --   --   --   --  215  --  193   NA  --   --   --   --  136  --  135*  --  135*   < > 136  --  142   POTASSIUM  --   --   --   --  4.0  --  4.0  --  3.9   < > 3.9  --  3.9   CHLORIDE  --   --   --   --  99  --  98  --  96*   < > 98  --  105   CO2  --   --   --   --  25  --  27  --  25   < > 30*  --  29   BUN  --   --   --   --  15.4  --  15.6  --  14.0   < > 11.6  --  8.1   CR  --   --   --   --  1.14  --  1.15  --  1.10   < > 1.16  --  1.17   ANIONGAP  --   --   --   --  12  --  10  --  14   < > 8  --  8   LLOYD  --   --   --   --  8.9  --  8.9  --  8.9   < > 9.0  --  9.3   * 240* 202*   < > 368*   < > 358*   < > 389*   < > 347*   < > 61*    < > = values in this interval not displayed.       Imaging:  No results found for this or any previous visit (from the past 24 hour(s)).

## 2023-05-01 ENCOUNTER — APPOINTMENT (OUTPATIENT)
Dept: OCCUPATIONAL THERAPY | Facility: CLINIC | Age: 83
DRG: 638 | End: 2023-05-01
Payer: COMMERCIAL

## 2023-05-01 LAB
ANION GAP SERPL CALCULATED.3IONS-SCNC: 13 MMOL/L (ref 7–15)
BUN SERPL-MCNC: 13.9 MG/DL (ref 8–23)
CALCIUM SERPL-MCNC: 9.2 MG/DL (ref 8.8–10.2)
CHLORIDE SERPL-SCNC: 99 MMOL/L (ref 98–107)
CREAT SERPL-MCNC: 1.26 MG/DL (ref 0.67–1.17)
DEPRECATED HCO3 PLAS-SCNC: 25 MMOL/L (ref 22–29)
ERYTHROCYTE [DISTWIDTH] IN BLOOD BY AUTOMATED COUNT: 14.8 % (ref 10–15)
GFR SERPL CREATININE-BSD FRML MDRD: 56 ML/MIN/1.73M2
GLUCOSE BLDC GLUCOMTR-MCNC: 134 MG/DL (ref 70–99)
GLUCOSE BLDC GLUCOMTR-MCNC: 174 MG/DL (ref 70–99)
GLUCOSE BLDC GLUCOMTR-MCNC: 176 MG/DL (ref 70–99)
GLUCOSE BLDC GLUCOMTR-MCNC: 190 MG/DL (ref 70–99)
GLUCOSE BLDC GLUCOMTR-MCNC: 250 MG/DL (ref 70–99)
GLUCOSE BLDC GLUCOMTR-MCNC: 252 MG/DL (ref 70–99)
GLUCOSE BLDC GLUCOMTR-MCNC: 275 MG/DL (ref 70–99)
GLUCOSE BLDC GLUCOMTR-MCNC: 280 MG/DL (ref 70–99)
GLUCOSE BLDC GLUCOMTR-MCNC: 299 MG/DL (ref 70–99)
GLUCOSE BLDC GLUCOMTR-MCNC: 311 MG/DL (ref 70–99)
GLUCOSE BLDC GLUCOMTR-MCNC: 349 MG/DL (ref 70–99)
GLUCOSE BLDC GLUCOMTR-MCNC: 379 MG/DL (ref 70–99)
GLUCOSE SERPL-MCNC: 386 MG/DL (ref 70–99)
HCT VFR BLD AUTO: 38.4 % (ref 40–53)
HGB BLD-MCNC: 12.4 G/DL (ref 13.3–17.7)
MCH RBC QN AUTO: 27.4 PG (ref 26.5–33)
MCHC RBC AUTO-ENTMCNC: 32.3 G/DL (ref 31.5–36.5)
MCV RBC AUTO: 85 FL (ref 78–100)
PLATELET # BLD AUTO: 246 10E3/UL (ref 150–450)
POTASSIUM SERPL-SCNC: 3.7 MMOL/L (ref 3.4–5.3)
POTASSIUM SERPL-SCNC: 4.4 MMOL/L (ref 3.4–5.3)
PROCALCITONIN SERPL IA-MCNC: 0.1 NG/ML
RBC # BLD AUTO: 4.52 10E6/UL (ref 4.4–5.9)
SODIUM SERPL-SCNC: 137 MMOL/L (ref 136–145)
WBC # BLD AUTO: 7.9 10E3/UL (ref 4–11)

## 2023-05-01 PROCEDURE — 250N000013 HC RX MED GY IP 250 OP 250 PS 637: Performed by: INTERNAL MEDICINE

## 2023-05-01 PROCEDURE — 85027 COMPLETE CBC AUTOMATED: CPT | Performed by: INTERNAL MEDICINE

## 2023-05-01 PROCEDURE — 97535 SELF CARE MNGMENT TRAINING: CPT | Mod: GO

## 2023-05-01 PROCEDURE — 250N000013 HC RX MED GY IP 250 OP 250 PS 637

## 2023-05-01 PROCEDURE — 250N000013 HC RX MED GY IP 250 OP 250 PS 637: Performed by: HOSPITALIST

## 2023-05-01 PROCEDURE — 84132 ASSAY OF SERUM POTASSIUM: CPT | Performed by: INTERNAL MEDICINE

## 2023-05-01 PROCEDURE — 120N000001 HC R&B MED SURG/OB

## 2023-05-01 PROCEDURE — 99232 SBSQ HOSP IP/OBS MODERATE 35: CPT | Performed by: STUDENT IN AN ORGANIZED HEALTH CARE EDUCATION/TRAINING PROGRAM

## 2023-05-01 PROCEDURE — 36415 COLL VENOUS BLD VENIPUNCTURE: CPT | Performed by: INTERNAL MEDICINE

## 2023-05-01 RX ORDER — FUROSEMIDE 20 MG
20 TABLET ORAL DAILY
Qty: 30 TABLET | Refills: 0 | Status: SHIPPED | OUTPATIENT
Start: 2023-05-01 | End: 2023-05-26

## 2023-05-01 RX ORDER — NICOTINE POLACRILEX 4 MG
15-30 LOZENGE BUCCAL
Status: DISCONTINUED | OUTPATIENT
Start: 2023-05-01 | End: 2023-05-02 | Stop reason: HOSPADM

## 2023-05-01 RX ORDER — INSULIN LISPRO 100 [IU]/ML
1-10 INJECTION, SOLUTION INTRAVENOUS; SUBCUTANEOUS
Qty: 15 ML | Refills: 0 | Status: SHIPPED | OUTPATIENT
Start: 2023-05-01 | End: 2023-05-02

## 2023-05-01 RX ORDER — DEXTROSE MONOHYDRATE 25 G/50ML
25-50 INJECTION, SOLUTION INTRAVENOUS
Status: DISCONTINUED | OUTPATIENT
Start: 2023-05-01 | End: 2023-05-02 | Stop reason: HOSPADM

## 2023-05-01 RX ADMIN — SENNOSIDES 2 TABLET: 8.6 TABLET ORAL at 21:06

## 2023-05-01 RX ADMIN — APIXABAN 5 MG: 5 TABLET, FILM COATED ORAL at 21:06

## 2023-05-01 RX ADMIN — FINASTERIDE 5 MG: 5 TABLET, FILM COATED ORAL at 08:06

## 2023-05-01 RX ADMIN — CARVEDILOL 12.5 MG: 12.5 TABLET, FILM COATED ORAL at 08:06

## 2023-05-01 RX ADMIN — TAMSULOSIN HYDROCHLORIDE 0.4 MG: 0.4 CAPSULE ORAL at 08:06

## 2023-05-01 RX ADMIN — AMLODIPINE BESYLATE 5 MG: 5 TABLET ORAL at 08:06

## 2023-05-01 RX ADMIN — IRBESARTAN 150 MG: 150 TABLET ORAL at 21:05

## 2023-05-01 RX ADMIN — APIXABAN 5 MG: 5 TABLET, FILM COATED ORAL at 08:06

## 2023-05-01 RX ADMIN — CARVEDILOL 12.5 MG: 12.5 TABLET, FILM COATED ORAL at 18:12

## 2023-05-01 ASSESSMENT — ACTIVITIES OF DAILY LIVING (ADL)
ADLS_ACUITY_SCORE: 29
ADLS_ACUITY_SCORE: 25
ADLS_ACUITY_SCORE: 25
ADLS_ACUITY_SCORE: 29
ADLS_ACUITY_SCORE: 25
ADLS_ACUITY_SCORE: 29
ADLS_ACUITY_SCORE: 25

## 2023-05-01 NOTE — PLAN OF CARE
Mental Status: A&O x4.  Activity/dangle: Asst of 1 GB/W.  Diet: Tolerating Mod Carb diet.  Pain: Darrick pain.  Pruett/Voiding: Pruett patent, in due to retention.  02/LDA: Room air. IV saline locked.  D/C Date: Possible discharge 5/2/2023.  Other Info: Blood glucose check.

## 2023-05-01 NOTE — PROGRESS NOTES
Care Management Follow Up    Length of Stay (days): 11    Expected Discharge Date: 05/01/2023     Concerns to be Addressed:       Patient plan of care discussed at interdisciplinary rounds: Yes    Anticipated Discharge Disposition:       Anticipated Discharge Services:    Anticipated Discharge DME:      Patient/family educated on Medicare website which has current facility and service quality ratings:    Education Provided on the Discharge Plan:    Patient/Family in Agreement with the Plan: yes    Referrals Placed by CM/SW:    Private pay costs discussed: Not applicable    Additional Information:  Writer was able to reschedule Pt's endocrinology appointment for 5/2 at 1PM.       Miko Peters, RN, BSN, Care Coordinator

## 2023-05-01 NOTE — PLAN OF CARE
Goal Outcome Evaluation:  5/1/23, 4.30 pm admit from Ortho spine  4/20/23, admit from ICU. DKA    Orientation: Alert and oriented, forgetful, calm and pleasant    Vitals/Tele: BP slightly elevated, on rA, no pain    IV Access/drains: MARYANNE SL    Diet: Mod carb    Mobility: A1 Sba, GB/W,has not gotten off bed P3 hours.    GI/: Pruett chronic, to discharge with Pruett    Wound/Skin: No known significant skin issue    Consults: Hospitalist following    Discharge Plan: If BG is within controlled level until tomorrow am. Discharge before 1`2 nn. So that patient can make 1pm  appointment with endocrinologist. Discharge medication in floor, placed in cabinet and refrigerator.

## 2023-05-01 NOTE — PLAN OF CARE
Date/Time: 4/30-5/1 2300-730 AM  Diagnosis:DKA  Mental Status: A/Ox4  Activity/dangle:SBA  Diet: carb count  Pain: Denies  Cho/Voiding:Continent on B/B;cho in place for retention with adequate output.  Tele/Restraints/Iso:NA  Skin: NA  02/LDA: VSS on RA, PIV patent/SL  D/C Date: Discharge to home,possibly today    Other Info: BG Q2. diabetic education on Monday at 1:00 appointment to get his insulin pump placed.Potasium protocol;no correction needed this shift.   Universal Safety Interventions

## 2023-05-01 NOTE — PROGRESS NOTES
[facility]  Hospitalist Progress Note    Date of Admission: 4/20/2023    Interval History    Blood sugars are more stabilized today. He is planning to go home with all and any risk of being at home.   He is refusing LTC and does not meet criteria for TCU.   This has been a long standing challenge/concern of his safety at home.   There are multiple dialogues and notes in his health care throughout the last few years about his safety at home but he has declined any type of other living facility.  Plan is for him to go over to diabetic education on Monday at 1:00 appointment to get his insulin pump placed.  There is no ideal situation given the circumstances      Assessment & Plan   Tc Garcia is a 83 year old male with a history of diabetes mellitus type 2, paroxysmal atrial fibrillation, hypertension, hyperlipidemia, pulmonary hypertension, spontaneous intracranial hemorrhage, recurrent pleural effusion, chronic kidney disease stage III, and anemia of chronic disease who presented to the ER with elevated blood sugars.  In the ER he was found to have diabetic ketoacidosis.  He was given IV fluids, potassium, and IV insulin.  The hospitalist service was contacted to admit him for further evaluation management.     Diabetic ketoacidosis  Diabetes mellitus type 1  Brittle/labile blood sugars   Hemoglobin A1c 8.0 on 4/20/2023.  He has an insulin pump at home and has been using it, denied any interruptions or errors.  Later determined that he had procedure with kyphoplasty on 4/10 and did not eat most of the day No other recent changes in his diet  In the ER he had a glucose of 547, ketones of 3.8, bicarb 18, anion gap 23.    Admit to inpatient.    4/20 admitted under the DKA protocol>> stopped on 4/21    Insulin pump on hold in hospital.     Multiple adjustment in insulin, carb count and sliding scale in hospital. (attempted to at least mimic his pump after discussion with diabetic educator)      Chest x-ray with no new changes. UA not collected initially on admit and then no fevers or WBC so did not send empiric UA with cho as no overt signs of infection.     Most likely cause of the DKA was procedure on Monday and no insulin for 8 hours and NPO. Suspect he was unable to get his diabetes back in order.     After multiple dialogues with family, patient, diabetic education there was a lot of concern over his insulin pump, his ability to manage this and home insulin even with subcutaneous insulin.     Patient very firm that he did not want to go to LTC and would rather pass away at home from a complication then go to LTC.     Given the lability and brittle nature of his diabetes suspect ongoing challenges to diabetes control.     Multiple dialogues this hospital stay regarding discharge to home and concerns (discussion with endocrine clinic, his family both daughters, home care social work)     4/29 experience significant increase in glucose on 4/28 running 300-400 and required multiple adjustments with every 4 hour sliding scale and carb counts.  He then was hypoglycemic overnight which was potentially anticipated given his history.    4/30 blood sugars appear to be back on track: Baseline lability.    Lantus 10 units at at bedtime.    Carb counting 1-10 for breakfast and 1:10 lunch and 1-15 for supper    Medium sliding scale but no bedtime sliding scale    Historically his evening sugars run slightly high but if covered he gets hypoglycemic in the night    Placed on every 2 hours Accu-Cheks from midnight to 6 AM review historically is at higher risk    Check BMP in am     HE WILL Have a continuous glucose monitor at home either the Dexcom or the freestyle lan    This is home regimen and doing better in hospital      Acute kidney injury  Chronic kidney disease, stage III    Baseline creatinine appears to be around 1.2.  Creatinine up to 1.72 in the ER.    Patient received IV fluids.  His creatinine is  elevated.    Known history of retention again noted this admission    Creatinine 1.62 but high PVR with straight cath required    May be post obstructive rather than all dehydration    Creatinine 1.14 stable     Lower extremity edema  Dyspnea on exertion  RV dilation and pulmonary hypertension/TR 3+   Has developed lower extremity edema and dyspnea on exertion over the last several days.  No prior history of congestive heart failure.    Echo with EF of 60 to 65%.  Right ventricle moderately dilated.  Moderately severe 3+ tricuspid regurgitation.  Moderate to severe pulmonary hypertension. (pulmonary hypertension on prior ECHO 2021)     4/22 venous dopplers negative of legs.     4/27 cardiology consult >> Increase in TR and RV dilation rec diuretics     4/27 V/Q no PE and ultrasound no DVT to suggest PE     Likely fluid overload per cardiology with Right sided higher pressures and edeam in legs.     Discharge with lasix 20 mg po daily check BMP in home care     BMP stable      Urinary retention    Noted on prior hospital to stay.    Patient required intermittent straight cathing in the past.    Started on Flomax in January 2023    This admission noted again to have urinary retention requiring straight cath.  May be contributing to increase renal function.    Monitor renal function as post obstruction is removed.     4/23 Cho catheter placed with recurrent retention >> requested urology consult > 700 retention    Indwelling cho catheter at discharge    Urology consult and will see in clinic in 1-2 weeks after discharge for a voiding trial.       Paroxysmal atrial fibrillation    Not on anticoagulation due to recent intracranial hemorrhage.    Continue PTA carvedilol.    Patient started on Eliquis 5 mg twice daily as per discussion with neurology and cardiology.     Ambulation stable     History of spontaneous intracranial hemorrhage  Admitted to [facility] in January 2023 due to nontraumatic  intraventricular intracerebral hemorrhage which was managed nonoperatively.    Noted.    Patient presented with dizziness, lightheadedness and a new intraventricular hemorrhage 1/2023     He was given Kcentra for anticoagulation reversal.    4/24 stroke neurology consult: upon further discussion there appears to be potential unresolved or not clarified follow up assessments to his previous stroke/hemorrhage.     4/26 reconsulted neurology stroke team.  CT and MRI showing no acute stroke.  Old changes from prior hemorrhage.    Previous stroke thought secondary to untreated hypertension with microvascular disease rather than risk of spontaneous bleed.    Recommending with his atrial fibrillation risk that the patient goes on Eliquis.    As noted from therapy he is steady.  Ambulated around the wards with no loss of balance.    Currently started on Eliquis 5 mg p.o. twice daily.  He has been stable on his feet: passed physical therapy for stability and ambulation.     IF future concerns eliquis at his cardiology follow up or primary then could consider a watchman     BP most of the time in range for BP      Hypertension  Blood pressure fairly well controlled in the ER.    Resumed home BP meds.     norvasc 5 mg po daily     Coreg 12.5 mg po BID     Lasix 20 mg po daily (NEW)    Avapro 150 mg po daily     Anticipate renal function may improve with straight caths..    As per neurology wtill need to improve /80     Anemia of chronic disease  Hemoglobin 11.8 on 4/20/2023.    Recheck in AM.    Hemoglobin 11.8 on last check and repeat on 4/25 4/28 hgb 11.9      Disposition    Case management involved.    In the past felt unsafe for patient to be at home with even a vulnerable adult report filed.    Interdisciplinary team's at the TCU did not feel he should go home but the family was okay with discharge as they felt the patient requested this    Again concerns about home safety    SLUMS 27/30 4/22 patient insisted  that he is going home irregardless of any risk. His family is aware as well.     He will not go to a LTC. Did not qualify for a TCU.     Multiple dialogues with endocrinology.     HE is going home. Would need to choose subcutaneous insulin vs pump and he has had the pump since 2019.     He was able to demonstrate some degree of subcutaneous insulin in hospital    Will go home with cho >> trial void in urology in a few week.     HE will get home care with PT at home. Labs through home care.     Follow up endocrinology clinic at 1 pm  (daughter to pick him up at 11:30 am) his son in law will help with technology on phone     Diet: Moderate Consistent Carb (60 g CHO per Meal) Diet  Room Service  Snacks/Supplements Adult: Other; snack at 10 pm; Between Meals  Cho Catheter: PRESENT, indication: Retention  DVT Prophylaxis: compression pneumoboots   Code Status: No CPR- Do NOT Intubate       Expected Discharge Date: 05/01/2023,  9:00 AM    Destination: inpatient rehabilitation facility  Discharge Comments: DKA, [facility]  SLUMS 27/30 4/26: Pt needs diabetic teaching  4/28: needs better BG control       SMILEY LUIS MD,   Hospitalist Service  [facility]  ______________________________________________________________________    -Data reviewed today: I reviewed all new labs and imaging results over the last 24 hours. I personally reviewed no images or EKG's today.    Physical Exam   Temp: 97.8  F (36.6  C) Temp src: Oral BP: (!) 154/79 Pulse: 75   Resp: 16 SpO2: 100 % O2 Device: None (Room air)    Vitals:    04/20/23 1801 04/30/23 1844   Weight: 72.6 kg (160 lb) 64 kg (141 lb 1.6 oz)   Constitutional: Patient is in no acute distress.  Respiratory: Breath sounds CTA. No increased work of breathing.  Cardiovascular: RRR, no rub or murmur.   GI: Soft, non-tender, non-distended.  Skin: Warm, dry, no rashes or lesions.  Other: 1-2 + edema   Patient has a Cho catheter in place    Medications     -  MEDICATION INSTRUCTIONS -       sodium chloride 75 mL/hr at 04/27/23 2336       amLODIPine  5 mg Oral Daily     apixaban ANTICOAGULANT  5 mg Oral BID     carvedilol  12.5 mg Oral BID w/meals     finasteride  5 mg Oral Daily     furosemide  20 mg Oral Daily     insulin aspart  1-7 Units Subcutaneous TID w/meals     insulin aspart   Subcutaneous Daily with breakfast     insulin aspart   Subcutaneous Daily with lunch     insulin aspart   Subcutaneous Daily with supper     insulin glargine  10 Units Subcutaneous Daily with supper     irbesartan  150 mg Oral At Bedtime     potassium chloride  40 mEq Oral Once     sennosides  2 tablet Oral BID     tamsulosin  0.4 mg Oral Daily       Data   Recent Labs   Lab 04/30/23  1629 04/30/23  1130 04/30/23  0816 04/30/23  0716 04/28/23  0557 04/28/23  0550 04/28/23  0225 04/27/23  2243 04/26/23  1123 04/26/23  1029 04/25/23  0732 04/25/23  0537   WBC  --   --   --   --   --  10.5  --   --   --  8.7  --  7.7   HGB  --   --   --   --   --  11.9*  --   --   --  12.3*  --  12.2*   MCV  --   --   --   --   --  84  --   --   --  85  --  86   PLT  --   --   --   --   --  210  --   --   --  215  --  193   NA  --   --   --  138  --  136  --  135*   < > 136  --  142   POTASSIUM  --   --   --  3.2*  --  4.0  --  4.0   < > 3.9  --  3.9   CHLORIDE  --   --   --  101  --  99  --  98   < > 98  --  105   CO2  --   --   --  29  --  25  --  27   < > 30*  --  29   BUN  --   --   --  12.3  --  15.4  --  15.6   < > 11.6  --  8.1   CR  --   --   --  1.16  --  1.14  --  1.15   < > 1.16  --  1.17   ANIONGAP  --   --   --  8  --  12  --  10   < > 8  --  8   LLOYD  --   --   --  9.0  --  8.9  --  8.9   < > 9.0  --  9.3   * 173* 80 82  82   < > 368*   < > 358*   < > 347*   < > 61*    < > = values in this interval not displayed.       Imaging:  No results found for this or any previous visit (from the past 24 hour(s)).

## 2023-05-01 NOTE — PROGRESS NOTES
[facility]    Medicine Progress Note - Hospitalist Service    Date of Admission:  4/20/2023    Assessment & Plan     Tc Garcia is a 83 year old male with a history of diabetes mellitus type 2, paroxysmal atrial fibrillation, hypertension, hyperlipidemia, pulmonary hypertension, spontaneous intracranial hemorrhage, recurrent pleural effusion, chronic kidney disease stage III, and anemia of chronic disease who presented to the ER with elevated blood sugars.  In the ER he was found to have diabetic ketoacidosis.  He was given IV fluids, potassium, and IV insulin.  The hospitalist service was contacted to admit him for further evaluation management.     Diabetic ketoacidosis  Diabetes mellitus type 2  Brittle/labile blood sugars   Hemoglobin A1c 8.0 on 4/20/2023.  He has an insulin pump at home and has been using it, denied any interruptions or errors.  Later determined that he had procedure with kyphoplasty on 4/10 and did not eat most of the day No other recent changes in his diet  In the ER he had a glucose of 547, ketones of 3.8, bicarb 18, anion gap 23.    Admit to inpatient.    4/20 admitted under the DKA protocol>> stopped on 4/21    Insulin pump on hold in hospital.     Multiple adjustment in insulin, carb count and sliding scale in hospital. (attempted to at least mimic his pump after discussion with diabetic educator)     Chest x-ray with no new changes. UA not collected initially on admit and then no fevers or WBC so did not send empiric UA with cho as no overt signs of infection.     Most likely cause of the DKA was procedure on Monday and no insulin for 8 hours and NPO. Suspect he was unable to get his diabetes back in order.     After multiple dialogues with family, patient, diabetic education there was a lot of concern over his insulin pump, his ability to manage this and home insulin even with subcutaneous insulin.     Patient very firm that he did not want to go to LTC and  would rather pass away at home from a complication then go to LTC.     Given the lability and brittle nature of his diabetes suspect ongoing challenges to diabetes control.     Multiple dialogues this hospital stay regarding discharge to home and concerns (discussion with endocrine clinic, his family both daughters, home care social work)     4/29 experience significant increase in glucose on 4/28 running 300-400 and required multiple adjustments with every 4 hour sliding scale and carb counts.  He then was hypoglycemic overnight which was potentially anticipated given his history.    4/30 blood sugars appear to be back on track: Baseline lability.  - Lantus 10 units at at bedtime.  - Carb counting 1-10 for breakfast and 1:10 lunch and 1-15 for supper  - Medium sliding scale but no bedtime sliding scale  - Historically his evening sugars run slightly high but if covered he gets hypoglycemic in the night  - Placed on every 2 hours Accu-Cheks from midnight to 6 AM review historically is at higher risk  - Check BMP in am   - HE WILL Have a continuous glucose monitor at home either the Dexcom or the freestyle lan  - This is home regimen and doing better in hospital  - Bmp reviwed not in DKA    5/1 Sugars in the 300s  -Continue Lantus 10 units at bedtime  -BMP reviewed normal AG and Bicarbonate 25  -MDSSI  -Carb counting 1units per 7gm at breakfast and lunch and 1 unit per 9gm at dinner        Acute kidney injury  Chronic kidney disease, stage III    Baseline creatinine appears to be around 1.2.  Creatinine up to 1.72 in the ER.    Patient received IV fluids.  His creatinine is elevated.    Known history of retention again noted this admission    Creatinine 1.62 but high PVR with straight cath required    May be post obstructive rather than all dehydration    Creatinine 1.26 stable     Lower extremity edema  Dyspnea on exertion  RV dilation and pulmonary hypertension/TR 3+   Has developed lower extremity edema and  dyspnea on exertion over the last several days.  No prior history of congestive heart failure.    Echo with EF of 60 to 65%.  Right ventricle moderately dilated.  Moderately severe 3+ tricuspid regurgitation.  Moderate to severe pulmonary hypertension. (pulmonary hypertension on prior ECHO 2021)     4/22 venous dopplers negative of legs.     4/27 cardiology consult >> Increase in TR and RV dilation rec diuretics     4/27 V/Q no PE and ultrasound no DVT to suggest PE     Likely fluid overload per cardiology with Right sided higher pressures and edeam in legs.     Discharge with lasix 20 mg po daily check BMP in home care     BMP stable      Urinary retention    Noted on prior hospital to stay.    Patient required intermittent straight cathing in the past.    Started on Flomax in January 2023    This admission noted again to have urinary retention requiring straight cath.  May be contributing to increase renal function.    Monitor renal function as post obstruction is removed.     4/23 Cho catheter placed with recurrent retention >> requested urology consult > 700 retention    Indwelling cho catheter at discharge    Urology consult and will see in clinic in 1-2 weeks after discharge for a voiding trial.         Paroxysmal atrial fibrillation    Not on anticoagulation due to recent intracranial hemorrhage.    Continue PTA carvedilol.    Patient started on Eliquis 5 mg twice daily as per discussion with neurology and cardiology.     Ambulation stable     History of spontaneous intracranial hemorrhage  Admitted to [facility] in January 2023 due to nontraumatic intraventricular intracerebral hemorrhage which was managed nonoperatively.    Noted.    Patient presented with dizziness, lightheadedness and a new intraventricular hemorrhage 1/2023     He was given Kcentra for anticoagulation reversal.    4/24 stroke neurology consult: upon further discussion there appears to be potential unresolved or not clarified  follow up assessments to his previous stroke/hemorrhage.     4/26 reconsulted neurology stroke team.  CT and MRI showing no acute stroke.  Old changes from prior hemorrhage.    Previous stroke thought secondary to untreated hypertension with microvascular disease rather than risk of spontaneous bleed.    Recommending with his atrial fibrillation risk that the patient goes on Eliquis.    As noted from therapy he is steady.  Ambulated around the wards with no loss of balance.    Currently started on Eliquis 5 mg p.o. twice daily.  He has been stable on his feet: passed physical therapy for stability and ambulation.     IF future concerns eliquis at his cardiology follow up or primary then could consider a watchman     BP most of the time in range for BP      Hypertension  Blood pressure fairly well controlled in the ER.    Resumed home BP meds.     norvasc 5 mg po daily     Coreg 12.5 mg po BID     Lasix 20 mg po daily (NEW)    Avapro 150 mg po daily     Anticipate renal function may improve with straight caths..    As per neurology wtill need to improve /80     Anemia of chronic disease  Hemoglobin 11.8 on 4/20/2023.    Recheck in AM.    Hemoglobin 11.8 on last check and repeat on 4/25 4/28 hgb 11.9      Disposition    Case management involved.    In the past felt unsafe for patient to be at home with even a vulnerable adult report filed.    Interdisciplinary team's at the TCU did not feel he should go home but the family was okay with discharge as they felt the patient requested this    Again concerns about home safety    SLUMS 27/30 4/22 patient insisted that he is going home irregardless of any risk. His family is aware as well.     He will not go to a LTC. Did not qualify for a TCU.     Multiple dialogues with endocrinology.     HE is going home. Would need to choose subcutaneous insulin vs pump and he has had the pump since 2019.     He was able to demonstrate some degree of subcutaneous insulin in  hospital    Will go home with cho >> trial void in urology in a few week.     HE will get home care with PT at home. Labs through home care.   Follow up endocrinology clinic at 1 pm .Family to pick him up   Diet: Moderate Consistent Carb (60 g CHO per Meal) Diet  Room Service  Snacks/Supplements Adult: Other; snack at 10 pm; Between Meals  Cho Catheter: PRESENT, indication: Retention  DVT Prophylaxis: compression pneumoboots   Code Status: No CPR- Do NOT Intubate        Expected Discharge Date: 05/02/2023,  12 PM    Destination: inpatient rehabilitation facility  Discharge Comments: [facility]  [facility] 27/30 4/26: Pt needs diabetic teaching  4/28: needs better BG control     5/1 Elevated sugars this morning               Diet: Moderate Consistent Carb (60 g CHO per Meal) Diet  Room Service  Snacks/Supplements Adult: Other; snack at 10 pm; Between Meals  Diet    DVT Prophylaxis: Pneumatic Compression Devices  Cho Catheter: PRESENT, indication: Retention  Lines: None     Cardiac Monitoring: None  Code Status: No CPR- Do NOT Intubate      Clinically Significant Risk Factors        # Hypokalemia: Lowest K = 3.2 mmol/L in last 2 days, will replace as needed                # DMII: A1C = 8.0 % (Ref range: <5.7 %) within past 6 months    # Moderate Malnutrition: based on nutrition assessment        Disposition Plan      Expected Discharge Date: 05/01/2023,  9:00 AM    Destination: inpatient rehabilitation facility  Discharge Comments: [facility]  [facility] 27/30 4/26: Pt needs diabetic teaching  4/28: needs better BG control          Shahla Lloyd MD  Hospitalist Service  [facility]  Securely message with [employer] (more info)  Text page via [employer] Paging/Directory   ______________________________________________________________________    Interval History   Patient seen and examined at bedside.  Denies any shortness of breath or chest pain.  Patient refusing to go to TCU or long-term care  facility and wants to be discharged home.   Patient wants uses insulin pump for further management at home.  He is comfortable with his continuous glucose monitor and monitor his blood glucose closely.  Physical Exam   Vital Signs: Temp: 98.1  F (36.7  C) Temp src: Oral BP: (!) 147/67 Pulse: 67   Resp: 16 SpO2: 95 % O2 Device: None (Room air)    Weight: 141 lbs 1.6 oz   Physical Exam  HENT:      Mouth/Throat:      Mouth: Mucous membranes are dry.   Cardiovascular:      Rate and Rhythm: Normal rate and regular rhythm.      Heart sounds: Normal heart sounds.   Pulmonary:      Effort: Pulmonary effort is normal. No respiratory distress.   Musculoskeletal:      Right lower leg: No edema.      Left lower leg: No edema.         {Medical Decision Making       Data     I have personally reviewed the following data over the past 24 hrs:    7.9  \   12.4 (L)   / 246     137 99 13.9 /  134 (H)   4.4 25 1.26 (H) \       Procal: N/A CRP: N/A Lactic Acid: N/A         Imaging results reviewed over the past 24 hrs:   No results found for this or any previous visit (from the past 24 hour(s)).

## 2023-05-02 VITALS
SYSTOLIC BLOOD PRESSURE: 145 MMHG | BODY MASS INDEX: 20.76 KG/M2 | DIASTOLIC BLOOD PRESSURE: 80 MMHG | RESPIRATION RATE: 17 BRPM | HEIGHT: 69 IN | TEMPERATURE: 97.6 F | OXYGEN SATURATION: 99 % | WEIGHT: 140.2 LBS | HEART RATE: 60 BPM

## 2023-05-02 LAB
ANION GAP SERPL CALCULATED.3IONS-SCNC: 10 MMOL/L (ref 7–15)
BUN SERPL-MCNC: 10.4 MG/DL (ref 8–23)
CALCIUM SERPL-MCNC: 9.5 MG/DL (ref 8.8–10.2)
CHLORIDE SERPL-SCNC: 103 MMOL/L (ref 98–107)
CREAT SERPL-MCNC: 1.16 MG/DL (ref 0.67–1.17)
DEPRECATED HCO3 PLAS-SCNC: 27 MMOL/L (ref 22–29)
GFR SERPL CREATININE-BSD FRML MDRD: 62 ML/MIN/1.73M2
GLUCOSE BLDC GLUCOMTR-MCNC: 178 MG/DL (ref 70–99)
GLUCOSE BLDC GLUCOMTR-MCNC: 181 MG/DL (ref 70–99)
GLUCOSE BLDC GLUCOMTR-MCNC: 188 MG/DL (ref 70–99)
GLUCOSE BLDC GLUCOMTR-MCNC: 198 MG/DL (ref 70–99)
GLUCOSE SERPL-MCNC: 209 MG/DL (ref 70–99)
POTASSIUM SERPL-SCNC: 4.1 MMOL/L (ref 3.4–5.3)
SODIUM SERPL-SCNC: 140 MMOL/L (ref 136–145)

## 2023-05-02 PROCEDURE — 99239 HOSP IP/OBS DSCHRG MGMT >30: CPT | Performed by: STUDENT IN AN ORGANIZED HEALTH CARE EDUCATION/TRAINING PROGRAM

## 2023-05-02 PROCEDURE — 36415 COLL VENOUS BLD VENIPUNCTURE: CPT | Performed by: INTERNAL MEDICINE

## 2023-05-02 PROCEDURE — 80048 BASIC METABOLIC PNL TOTAL CA: CPT | Performed by: INTERNAL MEDICINE

## 2023-05-02 PROCEDURE — 250N000013 HC RX MED GY IP 250 OP 250 PS 637: Performed by: INTERNAL MEDICINE

## 2023-05-02 PROCEDURE — 250N000013 HC RX MED GY IP 250 OP 250 PS 637

## 2023-05-02 PROCEDURE — 250N000013 HC RX MED GY IP 250 OP 250 PS 637: Performed by: HOSPITALIST

## 2023-05-02 RX ADMIN — TAMSULOSIN HYDROCHLORIDE 0.4 MG: 0.4 CAPSULE ORAL at 08:50

## 2023-05-02 RX ADMIN — AMLODIPINE BESYLATE 5 MG: 5 TABLET ORAL at 08:49

## 2023-05-02 RX ADMIN — APIXABAN 5 MG: 5 TABLET, FILM COATED ORAL at 08:49

## 2023-05-02 RX ADMIN — CARVEDILOL 12.5 MG: 12.5 TABLET, FILM COATED ORAL at 08:50

## 2023-05-02 RX ADMIN — FINASTERIDE 5 MG: 5 TABLET, FILM COATED ORAL at 08:50

## 2023-05-02 ASSESSMENT — ACTIVITIES OF DAILY LIVING (ADL)
ADLS_ACUITY_SCORE: 29

## 2023-05-02 NOTE — DISCHARGE SUMMARY
[facility]    Hospitalist Discharge Summary       Date of Admission:  4/20/2023  Date of Discharge:  5/2/2023 11:47 AM  Discharging Provider: Shahla Lloyd MD      Discharge Diagnoses   -Transition of care  Follow with Diabetic clinic   Follow with [place] [facility]   -Incidental Finding  Follow with Cardiology i  Follow-ups Needed After Discharge   Follow-up Appointments     Follow Up ([facility])      Follow up with [facility] in 10-14 days. Please call [phone]   to schedule an appointment with either an RN or PA-C for cho catheter   removal and trial of void.     [facility]  [address] [place] [id number]  You may call [phone] with any questions or concerns.         Appointments on [facility] and/or [facility] (with [facility] or [facility]   provider or service). Call [phone] if you haven't heard regarding   these appointments within 7 days of discharge.    Follow up with your Primary Doctor  Jessika Cordoba [phone] [address] [address] [address]  Monday May 8th 11:30am.  Please bring this paperwork with to the hospital.           Follow Up ([facility])      Follow up with Dr. Julien , at [facility], within 1 month  to follow up   on results. The following labs/tests are recommended: basic metabolic   profile .  See below for dates and times.  Call if you need to reschedule.       Appointments on [facility] and/or [facility] (with [facility] or [facility]   provider or service). Call [phone] if you haven't heard regarding   these appointments within 7 days of discharge.         Follow Up ([facility])      1.  Please follow up as scheduled at [facility] (kidney   specialists) at our [facility].  Please call [phone] to schedule   the appointment if you have not heard from us within 1 week of discharge.    Appointment 5/2/2023 at 1PM with Jessika Cabrera  [facility]    [address]   [address],   [place] [address]    phone: [phone]   fax: [phone]         Follow-up and recommended labs and tests       Follow up with primary care provider, Jessika Cordoba, within 7   days to evaluate medication change.  The following labs/tests are   recommended: basic metabolic and cbc .         {    Unresulted Labs Ordered in the Past 30 Days of this Admission     No orders found from 3/21/2023 to 4/21/2023.        Hospital Course   Tc Garcia is a 83 year old male with a history of diabetes mellitus type 2, paroxysmal atrial fibrillation, hypertension, hyperlipidemia, pulmonary hypertension, spontaneous intracranial hemorrhage, recurrent pleural effusion, chronic kidney disease stage III, and anemia of chronic disease who presented to the ER with elevated blood sugars.  In the ER he was found to have diabetic ketoacidosis.  He was given IV fluids, potassium, and IV insulin.  The hospitalist service was contacted to admit him for further evaluation management.     Diabetic ketoacidosis  Diabetes mellitus type 2  Brittle/labile blood sugars   Hemoglobin A1c 8.0 on 4/20/2023.  He has an insulin pump at home and has been using it, denied any interruptions or errors.  Later determined that he had procedure with kyphoplasty on 4/10 and did not eat most of the day No other recent changes in his diet  In the ER he had a glucose of 547, ketones of 3.8, bicarb 18, anion gap 23.    Admit to inpatient.    4/20 admitted under the DKA protocol>> stopped on 4/21    Insulin pump on hold in hospital.     Multiple adjustment in insulin, carb count and sliding scale in hospital. (attempted to at least mimic his pump after discussion with diabetic educator)     Chest x-ray with no new changes. UA not collected initially on admit and then no fevers or WBC so did not send empiric UA with tammie as no overt signs of infection.     Most likely cause of the DKA  was procedure on Monday and no insulin for 8 hours and NPO. Suspect he was unable to get his diabetes back in order.     After multiple dialogues with family, patient, diabetic education there was a lot of concern over his insulin pump, his ability to manage this and home insulin even with subcutaneous insulin.     Patient very firm that he did not want to go to LTC and would rather pass away at home from a complication then go to LTC.     Given the lability and brittle nature of his diabetes suspect ongoing challenges to diabetes control.     Multiple dialogues this hospital stay regarding discharge to home and concerns (discussion with endocrine clinic, his family both daughters, home care social work)     4/29 experience significant increase in glucose on 4/28 running 300-400 and required multiple adjustments with every 4 hour sliding scale and carb counts.  He then was hypoglycemic overnight which was potentially anticipated given his history.    4/30 blood sugars appear to be back on track: Baseline lability.  - Lantus 10 units at at bedtime.  - Carb counting 1-10 for breakfast and 1:10 lunch and 1-15 for supper  - Medium sliding scale but no bedtime sliding scale  - Historically his evening sugars run slightly high but if covered he gets hypoglycemic in the night  - Placed on every 2 hours Accu-Cheks from midnight to 6 AM review historically is at higher risk  - Check BMP in am   - HE WILL Have a continuous glucose monitor at home either the Dexcom or the freestyle lan  - This is home regimen and doing better in hospital  - Bmp reviwed not in DKA    5/1 Sugars in the 300s  -Continue Lantus 10 units at bedtime  -BMP reviewed normal AG and Bicarbonate 25  -MDSSI  -Carb counting 1units per 7gm at breakfast and lunch and 1 unit per 9gm at dinner  5/2 Discussed with diabetic clinic and spoke with Jessika and patient has an appointment at 1:00 and will be accompanied by his daughter.  Jessika said that she  would adjust the insulin pump dosing        Acute kidney injury  Chronic kidney disease, stage III    Baseline creatinine appears to be around 1.2.  Creatinine up to 1.72 in the ER.    Patient received IV fluids.  His creatinine is elevated.    Known history of retention again noted this admission    Creatinine 1.62 but high PVR with straight cath required    May be post obstructive rather than all dehydration    Creatinine  5/1/221.16 stable     Lower extremity edema  Dyspnea on exertion  RV dilation and pulmonary hypertension/TR 3+   Has developed lower extremity edema and dyspnea on exertion over the last several days.  No prior history of congestive heart failure.    Echo with EF of 60 to 65%.  Right ventricle moderately dilated.  Moderately severe 3+ tricuspid regurgitation.  Moderate to severe pulmonary hypertension. (pulmonary hypertension on prior ECHO 2021)     4/22 venous dopplers negative of legs.     4/27 cardiology consult >> Increase in TR and RV dilation rec diuretics     4/27 V/Q no PE and ultrasound no DVT to suggest PE     Likely fluid overload per cardiology with Right sided higher pressures and edeam in legs.     Discharge with lasix 20 mg po daily check BMP in home care     BMP stable      Urinary retention    Noted on prior hospital to stay.    Patient required intermittent straight cathing in the past.    Started on Flomax in January 2023    This admission noted again to have urinary retention requiring straight cath.  May be contributing to increase renal function.    Monitor renal function as post obstruction is removed.     4/23 Cho catheter placed with recurrent retention >> requested urology consult > 700 retention    Indwelling cho catheter at discharge    Urology consult and will see in clinic in 1-2 weeks after discharge for a voiding trial.         Paroxysmal atrial fibrillation    Not on anticoagulation due to recent intracranial hemorrhage.    Continue PTA carvedilol.    Patient  started on Eliquis 5 mg twice daily as per discussion with neurology and cardiology.     Ambulation stable    Follow with outpatient cardiology    Follow-up with cardiology as an outpatient.    Discussed with patient the risk of bleeding on anticoagulation and to closely watch for any bleeding and go to the ER for any bleeding concerns.  Patient verbalized understanding     History of spontaneous intracranial hemorrhage  Admitted to [facility] in January 2023 due to nontraumatic intraventricular intracerebral hemorrhage which was managed nonoperatively.    Noted.    Patient presented with dizziness, lightheadedness and a new intraventricular hemorrhage 1/2023     He was given Kcentra for anticoagulation reversal.    4/24 stroke neurology consult: upon further discussion there appears to be potential unresolved or not clarified follow up assessments to his previous stroke/hemorrhage.     4/26 reconsulted neurology stroke team.  CT and MRI showing no acute stroke.  Old changes from prior hemorrhage.    Previous stroke thought secondary to untreated hypertension with microvascular disease rather than risk of spontaneous bleed.    Recommending with his atrial fibrillation risk that the patient goes on Eliquis.    As noted from therapy he is steady.  Ambulated around the wards with no loss of balance.    Currently started on Eliquis 5 mg p.o. twice daily.  He has been stable on his feet: passed physical therapy for stability and ambulation.     IF future concerns eliquis at his cardiology follow up or primary then could consider a watchman     BP most of the time in range for BP      Hypertension  Blood pressure fairly well controlled in the ER.    Resumed home BP meds.     norvasc 5 mg po daily     Coreg 12.5 mg po BID     Lasix 20 mg po daily (NEW)    Avapro 150 mg po daily     Anticipate renal function may improve with straight caths..    As per neurology wtill need to improve /80     Anemia of chronic  disease  Hemoglobin 11.8 on 4/20/2023.    Recheck in AM.    Hemoglobin 11.8 on last check and repeat on 4/25 4/28 hgb 11.9     5/1 hb 12.4     Disposition    Case management involved.    In the past felt unsafe for patient to be at home with even a vulnerable adult report filed.    Interdisciplinary team's at the TCU did not feel he should go home but the family was okay with discharge as they felt the patient requested this    Again concerns about home safety    [facility] 27/30 4/22 patient insisted that he is going home irregardless of any risk. His family is aware as well.     He will not go to a LTC. Did not qualify for a TCU.     Multiple dialogues with endocrinology.     HE is going home. Would need to choose subcutaneous insulin vs pump and he has had the pump since 2019.     He was able to demonstrate some degree of subcutaneous insulin in hospital    Will go home with cho >> trial void in urology in a few week.     HE will get home care with PT at home. Labs through home care.   Follow up endocrinology clinic at 1 pm .Family to pick him up           Diet: Moderate Consistent Carb (60 g CHO per Meal) Diet  Room Service  Snacks/Supplements Adult: Other; snack at 10 pm; Between Meals  Cho Catheter: PRESENT, indication: Retention  DVT Prophylaxis: compression pneumoboots   Code Status: No CPR- Do NOT Intubate        Expected Discharge Date: 05/02/2023,  12 PM    Destination: inpatient rehabilitation facility  Discharge Comments[facility]  [facility] Pt needs diabetic teaching  4/28: needs better BG control     5/1 Elevated sugars this morning              Consultations This Hospital Stay   OCCUPATIONAL THERAPY ADULT IP CONSULT  PHYSICAL THERAPY ADULT IP CONSULT  CARE MANAGEMENT / SOCIAL WORK IP CONSULT  UROLOGY IP CONSULT  NEPHROLOGY IP CONSULT  NEUROLOGY IP CONSULT  NEUROLOGY IP STROKE CONSULT  PHARMACY LIAISON FOR MEDICATION COVERAGE CONSULT  CARDIOLOGY IP CONSULT  UROLOGY IP CONSULT    Code  Status   No CPR- Do NOT Intubate    Time Spent on this Encounter   I,Shahla Lloyd, personally saw the patient today and spent approximately greater than 30 minutes discharging this patient.       Shahla Lloyd MD  [facility]  ______________________________________________________________________    Physical Exam   Vital Signs: Temp: 97.6  F (36.4  C) Temp src: Oral BP: (!) 145/80 Pulse: 60   Resp: 17 SpO2: 99 % O2 Device: None (Room air)    Weight: 140 lbs 3.2 oz    Physical Exam  Cardiovascular:      Rate and Rhythm: Normal rate and regular rhythm.      Heart sounds: Normal heart sounds.   Pulmonary:      Effort: No respiratory distress.      Breath sounds: Normal breath sounds. No wheezing.   Abdominal:      Palpations: Abdomen is soft.      Tenderness: There is no abdominal tenderness.   Musculoskeletal:      Right lower leg: No edema.      Left lower leg: No edema.   Neurological:      Mental Status: He is alert.               Primary Care Physician   Jessika Cordoba    Discharge Disposition   Discharged to home  Condition at discharge: Fair    Significant Results and Procedures   Most Recent 3 CBC's:  Recent Labs   Lab Test 05/01/23  0751 04/28/23  0550 04/26/23  1029   WBC 7.9 10.5 8.7   HGB 12.4* 11.9* 12.3*   MCV 85 84 85    210 215     Most Recent 3 BMP's:  Recent Labs   Lab Test 05/02/23  0818 05/02/23  0729 05/02/23  0556 05/01/23  0804 05/01/23  0751 05/01/23  0029 05/01/23  0020 04/30/23  0816 04/30/23  0716   NA  --  140  --   --  137  --   --   --  138   POTASSIUM  --  4.1  --   --  4.4  --  3.7  --  3.2*   CHLORIDE  --  103  --   --  99  --   --   --  101   CO2  --  27  --   --  25  --   --   --  29   BUN  --  10.4  --   --  13.9  --   --   --  12.3   CR  --  1.16  --   --  1.26*  --   --   --  1.16   ANIONGAP  --  10  --   --  13  --   --   --  8   LLOYD  --  9.5  --   --  9.2  --   --   --  9.0   * 209* 188*   < > 386*   < >   --    < > 82  82    < > = values in this interval not displayed.     Most Recent 2 LFT's:  Recent Labs   Lab Test 04/20/23  1808 02/11/23  0418   AST 16 22   ALT 6* 11   ALKPHOS 92 64   BILITOTAL 1.1 0.6     Most Recent 3 INR's:  Recent Labs   Lab Test 01/15/23  1321 11/21/20  1146 07/17/20  0950   INR 1.13 1.50* 1.18*     Most Recent 3 Troponin's:  Recent Labs   Lab Test 03/17/21  0752 07/08/20  1223 05/21/20  1302   TROPI <0.015 <0.015 0.034     Most Recent 3 BNP's:  Recent Labs   Lab Test 04/27/23  1420 06/08/22  0853 02/09/21  1306 12/01/20  1434 06/16/20  1313 05/21/20  1302 05/10/20  2207   NTBNPI 2,533*  --   --   --   --  5,560* 7,143*   NTBNP  --  2,832* 3,149* 3,052*   < >  --   --     < > = values in this interval not displayed.     Most Recent D-dimer:  Recent Labs   Lab Test 04/26/23  2300   DD 0.61*     Most Recent Cholesterol Panel:  Recent Labs   Lab Test 01/17/23  0436   CHOL 112   LDL 52   HDL 50   TRIG 51       Results for orders placed or performed during the hospital encounter of 04/20/23   XR Chest 2 Views    Narrative    EXAM: XR CHEST 2 VIEWS  LOCATION: [facility]  DATE/TIME: 4/20/2023 8:29 PM CDT    INDICATION: hyperglycemia, looking for infection source  COMPARISON: 01/29/2023      Impression    IMPRESSION: Heart size borderline enlarged. Pulmonary vascularity normal. Small left pleural effusion and left basilar atelectasis with minimal change. Linear atelectasis mid left lung. The right lung is clear. Old mid right rib fracture. Upper lumbar   vertebroplasty.   US Lower Extremity Venous Duplex Bilateral    Narrative    EXAM: ULTRASOUND LOWER EXTREMITY VENOUS DUPLEX BILATERAL  LOCATION: [facility]  DATE/TIME: 04/22/2023, 12:09 PM CDT    INDICATION: Edema.  COMPARISON: None.  TECHNIQUE: Venous Duplex ultrasound of bilateral lower extremities with and without compression, augmentation and duplex. Color flow and spectral Doppler with  waveform analysis performed.    FINDINGS: Exam includes the common femoral, femoral, popliteal veins as well as segmentally visualized deep calf veins and greater saphenous vein.     RIGHT: No deep vein thrombosis. No superficial thrombophlebitis. No popliteal cyst.    LEFT: No deep vein thrombosis. No superficial thrombophlebitis. No popliteal cyst.      Impression    IMPRESSION:  1.  No deep venous thrombosis in the bilateral lower extremities.       CT Head w/o Contrast    Narrative    CT SCAN OF THE HEAD WITHOUT CONTRAST   4/24/2023 3:58 PM     HISTORY: Interval evaluation.    TECHNIQUE:  Axial images of the head and coronal reformations without  IV contrast material. Radiation dose for this scan was reduced using  automated exposure control, adjustment of the mA and/or kV according  to patient size, or iterative reconstruction technique.    COMPARISON: Head CT 2/11/2023.    FINDINGS: Previous isodense hemorrhage in the right periventricular  white matter also involving the right lateral ventricle is no longer  present. No new intracranial hemorrhage identified. No mass effect or  midline shift. Moderate diffuse parenchymal volume loss. Patchy  periventricular white matter hypodensities are nonspecific, but most  likely related to chronic microvascular ischemic disease. No abnormal  extra axial fluid collection.    The visualized portions of the sinuses and mastoids appear normal. The  bony calvarium and bones of the skull base appear intact.       Impression    IMPRESSION:     1. Previous isodense blood products in the periventricular white  matter and right lateral ventricle from 2/11/2023 are no longer  identified. No new intracranial hemorrhage.   2. Moderate diffuse parenchymal volume loss and white matter changes  most likely due to chronic microvascular ischemic disease.      CLINT CAAL MD         SYSTEM ID:  [id number]   MR Brain w/o & w Contrast    Narrative    EXAM: MR BRAIN W/O and W  CONTRAST  LOCATION: [facility]  DATE/TIME: 4/25/2023 9:34 PM CDT    INDICATION: Interval ICH, evaluate for etiology.  COMPARISON: None.  CONTRAST: 7 mL Gadavist  TECHNIQUE: Routine multiplanar multisequence head MRI without and with intravenous contrast.    FINDINGS:  INTRACRANIAL CONTENTS: No acute or subacute infarct. No mass, acute hemorrhage, or extra-axial fluid collections. Scattered nonspecific T2/FLAIR hyperintensities within the cerebral white matter most consistent with mild chronic microvascular ischemic   change. Susceptibility artifact in the atrium and temporal horn of the right lateral ventricle noted likely related to old hemorrhage arising from the right temporal lobe. Moderate generalized cerebral atrophy. No hydrocephalus. Normal position of the   cerebellar tonsils. No pathologic contrast enhancement.    SELLA: No abnormality accounting for technique.    OSSEOUS STRUCTURES/SOFT TISSUES: Normal marrow signal. The major intracranial vascular flow voids are maintained.     ORBITS: No abnormality accounting for technique.     SINUSES/MASTOIDS: No paranasal sinus mucosal disease. No middle ear or mastoid effusion.       Impression    IMPRESSION:  1.  No acute intracranial process.  2.  Chronic hemorrhage products paralleling the lateral aspect of the right lateral ventricle likely related to the previous hemorrhage arising from the right temporal lobe.  3.  No recent hemorrhage.  4.  No evidence for enhancing lesion in the right temporal lobe.  5.  Generalized brain atrophy and presumed microvascular ischemic changes as detailed above.   US Lower Extremity Venous Duplex Bilateral    Narrative    VENOUS ULTRASOUND BILATERAL LEG(S)  4/27/2023 8:32 AM     HISTORY: Bilateral lower extremity Swelling + ddimer    COMPARISON: 4/22/2023.    FINDINGS: Examination of the deep veins with graded compression and  color flow Doppler with spectral wave form analysis was performed.  Images  show no evidence of thrombus in the bilateral common femoral  vein, femoral vein, popliteal vein or calf veins.      Impression    IMPRESSION: No deep vein thrombosis in either lower extremity.      CHON SARAH DO         SYSTEM ID:  [id number]   NM Lung Scan Ventilation and Perfusion    Narrative    EXAM: NM LUNG SCAN VENTILATION AND PERFUSION  LOCATION: [facility]  DATE/TIME: 2023 1:43 PM CDT    INDICATION: Shortness of breath.  COMPARISON: Chest x-ray dated 2023.  TECHNIQUE: 64.4 mCi Tc-99m DTPA inhaled. 6.0 mCi Tc-99m MAA, IV. Standard planar imaging during perfusion and ventilation portions of exam.    FINDINGS: Mildly heterogeneous pulmonary ventilation. Normal pulmonary perfusion. No mismatched segmental perfusion defect.      Impression    IMPRESSION:    No evidence of pulmonary embolism.   XR Chest 2 Views    Narrative    CHEST TWO VIEWS 2023 1:00 PM     HISTORY: Prior to nuclear medicine VQ lung scan.    COMPARISON: 2023       Impression    IMPRESSION: Similar minimal pleural fluid and pleural thickening as  well as atelectasis and/or fibrosis on the left. Trace pleural fluid  on the right. Remaining right lung clear. The cardiac silhouette is  not enlarged. Pulmonary vasculature is unremarkable.    ALLYSON MARTINEZ MD         SYSTEM ID:  [id number]   Echocardiogram Complete     Value    LVEF  60-65%    Narrative    [id number]  [id number]  [id number]  [id number]^NEEL^BROCK^AROLDO     [facility]  Echocardiography Laboratory  [address] [place] [address]     Name: CEDRICK PHILLIPS  MRN: [id number]  : 1940  Study Date: 2023 01:55 PM  Age: 83 yrs  Gender: Male  Patient Location: [facility]  Reason For Study: CHF  Ordering Physician: BROCK SHAIKH  Referring Physician: Jessika Cordoba  Performed By: Lokesh Smallwood     BSA: 1.9 m2  Height: 69 in  Weight: 160 lb  HR: 74  BP: 139/78  mmHg  ______________________________________________________________________________  Procedure  Complete Portable Echo Adult.  ______________________________________________________________________________  Interpretation Summary     The visual ejection fraction is 60-65%.  Left ventricular systolic function is normal.  The right ventricle is moderately dilated.  There is moderately severe (3+) tricuspid regurgitation.  Right ventricular systolic pressure is elevated, consistent with moderate to  severe pulmonary hypertension.  Mild valvular aortic stenosis.  The ascending aorta is Mildly dilated.  Trivial pericardial effusion  The rhythm was atrial fibrillation.  ______________________________________________________________________________  Left Ventricle  The left ventricle is normal in size. There is normal left ventricular wall  thickness. The visual ejection fraction is 60-65%. Left ventricular systolic  function is normal. Diastolic function not assessed due to atrial  fibrillation.     Right Ventricle  The right ventricle is moderately dilated. The right ventricular systolic  function is normal.     Atria  The left atrium is severely dilated. The right atrium is severely dilated.  There is no color Doppler evidence of an atrial shunt.     Mitral Valve  There is moderate mitral annular calcification. There is trace mitral  regurgitation.     Tricuspid Valve  There is moderately severe (3+) tricuspid regurgitation. The right ventricular  systolic pressure is elevated at 53.3 mmHg. IVC diameter >2.1 cm collapsing  <50% with sniff suggests a high RA pressure estimated at 15 mmHg or greater.  Right ventricular systolic pressure is elevated, consistent with moderate to  severe pulmonary hypertension.     Aortic Valve  The aortic valve is trileaflet. Significantly decreased motion of the left  coronary cusp. No aortic regurgitation is present. Mild valvular aortic  stenosis. The peak AoV pressure gradient is 12.0  mmHg. The mean AoV pressure  gradient is 6.6 mmHg. The calculated aortic valve are is 1.9 cm^2.     Pulmonic Valve  There is trace pulmonic valvular regurgitation.     Vessels  The aortic root is normal size. The ascending aorta is Mildly dilated. IVC  diameter >2.1 cm collapsing <50% with sniff suggests a high RA pressure  estimated at 15 mmHg or greater.     Pericardium  Trivial pericardial effusion.     Rhythm  The rhythm was atrial fibrillation.  ______________________________________________________________________________  MMode/2D Measurements & Calculations  IVSd: 0.97 cm     LVIDd: 4.8 cm  LVIDs: 3.7 cm  LVPWd: 0.99 cm  FS: 24.1 %  LV mass(C)d: 167.2 grams  LV mass(C)dI: 89.0 grams/m2  Ao root diam: 3.4 cm  LA dimension: 4.7 cm  asc Aorta Diam: 3.7 cm  LA/Ao: 1.4  LVOT diam: 2.0 cm  LVOT area: 3.2 cm2  LA Volume (BP): 122.0 ml  LA Volume Index (BP): 64.9 ml/m2  RWT: 0.41  TAPSE: 1.3 cm     Doppler Measurements & Calculations  MV E max williams: 126.0 cm/sec  MV A max williams: 64.7 cm/sec  MV E/A: 1.9  MV max P.8 mmHg  MV mean P.8 mmHg  MV V2 VTI: 46.3 cm  MVA(VTI): 1.7 cm2  MV dec slope: 857.6 cm/sec2  MV dec time: 0.15 sec  Ao V2 max: 176.5 cm/sec  Ao max P.0 mmHg  Ao V2 mean: 121.5 cm/sec  Ao mean P.6 mmHg  Ao V2 VTI: 40.6 cm  CAROLYNN(I,D): 1.9 cm2  CAROLYNN(V,D): 1.9 cm2  LV V1 max P.5 mmHg  LV V1 max: 105.8 cm/sec  LV V1 VTI: 25.1 cm  SV(LVOT): 79.2 ml  SI(LVOT): 42.2 ml/m2  PA acc time: 0.11 sec  TR max williams: 365.1 cm/sec  TR max P.3 mmHg  AV Williams Ratio (DI): 0.60  CAROLYNN Index (cm2/m2): 1.0  E/E' av.2  Lateral E/e': 18.1  Medial E/e': 20.3  RV S Williams: 8.2 cm/sec     ______________________________________________________________________________  Report approved by: Momo Franco 2023 03:50 PM             Discharge Orders      Basic metabolic panel     N terminal pro BNP outpatient     Basic metabolic panel  (Ca, Cl, CO2, Creat, Gluc, K, Na, BUN)    Home care to draw bMP and  results to primary     Medication Therapy Management Referral      Follow-Up with Cardiology TAMAR      Primary Care - Care Coordination Referral      Home Care Referral      Follow Up ([facility])    Follow up with [facility] in 10-14 days. Please call [phone] to schedule an appointment with either an RN or PA-C for cho catheter removal and trial of void.     [facility]  [address] [place] [address]  You may call [phone] with any questions or concerns.         Appointments on [facility] and/or [facility] (with [facility] or [facility] provider or service). Call [phone] if you haven't heard regarding these appointments within 7 days of discharge.    Follow up with your Primary Doctor  Jessika Cordoba [phone] [address] [address]  Monday May 8th 11:30am.  Please bring this paperwork with to the hospital.     Follow Up ([facility])    Follow up with Dr. Julien , at [facility], within 1 month  to follow up on results. The following labs/tests are recommended: basic metabolic profile .  See below for dates and times.  Call if you need to reschedule.     Appointments on [facility] and/or [facility] (with [facility] or [facility] provider or service). Call [phone] if you haven't heard regarding these appointments within 7 days of discharge.     Follow Up ([facility])    1.  Please follow up as scheduled at [facility] (kidney specialists) at our [facility].  Please call [phone] to schedule the appointment if you have not heard from us within 1 week of discharge.    Appointment 5/2/2023 at 1PM with Jessika    Endocrinology Clinic [facility]    [address]   [address] [address]    phone: [phone]   fax: [phone]     Reason for your hospital stay    Diabetic ketoacidosis     Follow-up and recommended labs and tests     Follow up with primary care provider, Jessika Ratliff  Adal[facility], within 7 days to evaluate medication change.  The following labs/tests are recommended: basic metabolic and cbc .     Activity    Your activity upon discharge: activity as tolerated with walker     Diet    Follow this diet upon discharge: Orders Placed This Encounter      Room Service      Snacks/Supplements Adult: Other; snack at 10 pm; Between Meals      Moderate Consistent Carb (60 g CHO per Meal) Diet     Discharge Medications   Current Discharge Medication List      START taking these medications    Details   apixaban ANTICOAGULANT (ELIQUIS) 5 MG tablet Take 1 tablet (5 mg) by mouth 2 times daily  Qty: 60 tablet, Refills: 0    Associated Diagnoses: Chronic atrial fibrillation (H)      !! finasteride (PROSCAR) 5 MG tablet Take 1 tablet (5 mg) by mouth daily  Refills: 0    Associated Diagnoses: Essential hypertension      furosemide (LASIX) 20 MG tablet Take 1 tablet (20 mg) by mouth daily  Qty: 30 tablet, Refills: 0    Associated Diagnoses: Congestive heart failure, unspecified HF chronicity, unspecified heart failure type (H)      tamsulosin (FLOMAX) 0.4 MG capsule Take 1 capsule (0.4 mg) by mouth daily  Qty: 30 capsule, Refills: 0    Associated Diagnoses: Urinary retention       !! - Potential duplicate medications found. Please discuss with provider.      CONTINUE these medications which have CHANGED    Details   acetaminophen (TYLENOL) 325 MG tablet Take 2 tablets (650 mg) by mouth every 6 hours as needed for mild pain or fever  Refills: 0    Associated Diagnoses: Pain      amLODIPine (NORVASC) 5 MG tablet Take 1 tablet (5 mg) by mouth daily  Refills: 0    Associated Diagnoses: Essential hypertension; Pulmonary hypertension (H)         CONTINUE these medications which have NOT CHANGED    Details   carvedilol (COREG) 12.5 MG tablet Take 1 tablet (12.5 mg) by mouth 2 times daily (with meals)  Qty: 180 tablet, Refills: 3    Associated Diagnoses: Nontraumatic intraventricular intracerebral hemorrhage,  unspecified laterality (H)      !! finasteride (PROSCAR) 5 MG tablet Take 5 mg by mouth daily      glucagon 1 MG kit 1 mg as needed for low blood sugar      HUMALOG 100 UNIT/ML injection For refilling insulin pump      INSULIN PUMP - OUTPATIENT Medtronic device with no CGM and site change every 3 days   Sensitivity factor (midnight to midnight): 55  Bolus (units:gram): 8745-8985 = 1:9, 0730-3222 = 1:7, 3739-7723 = 1:7, 4250-1924 = 1:9, 6569-8123 = 1:13  Basal (units/hour): 2961-8693 = 0.45, 0127-7562 = 0.5, 0909-3393 = 0.625, 3195-3907 = 0.6, 4376-4580 = 0.45      irbesartan (AVAPRO) 150 MG tablet Take 1 tablet (150 mg) by mouth At Bedtime  Qty: 90 tablet, Refills: 3    Associated Diagnoses: Chronic diastolic heart failure (H); Essential hypertension; Acute on chronic heart failure with preserved ejection fraction (HFpEF) (H); Chronic heart failure with preserved ejection fraction (HFpEF) (H)       !! - Potential duplicate medications found. Please discuss with provider.      STOP taking these medications       cyclobenzaprine (FLEXERIL) 10 MG tablet Comments:   Reason for Stopping:         HYDROcodone-acetaminophen (NORCO) 5-325 MG tablet Comments:   Reason for Stopping:         insulin glargine (LANTUS PEN) 100 UNIT/ML pen Comments:   Reason for Stopping:         lovastatin (MEVACOR) 20 MG tablet Comments:   Reason for Stopping:         traMADol (ULTRAM) 50 MG tablet Comments:   Reason for Stopping:             Allergies   Allergies   Allergen Reactions     No Known Drug Allergy

## 2023-05-02 NOTE — CONSULTS
Care Management Discharge Note    Discharge Date: 05/01/2023       Discharge Disposition:  Home with homecare and follow up's     Discharge Services:  n/a    Discharge DME:  n/a    Discharge Transportation: family or friend will provide    Private pay costs discussed: Not applicable    Does the patient's insurance plan have a 3 day qualifying hospital stay waiver?  No    PAS Confirmation Code:  n/a  Patient/family educated on Medicare website which has current facility and service quality ratings:  yes    Education Provided on the Discharge Plan:yes    Persons Notified of Discharge Plans: Discharging MD, Bedside RN and patient  Patient/Family in Agreement with the Plan: yes    Handoff Referral Completed: Yes    Additional Information:  Writer met with the patient at the bedside. Patient is A+Ox4 and cleared for discharge to home with homecare through [facility] (RN/PT/OT) and follow up including Endocrine (5/2 at 13:00 patient and  aware), PCP follow up in [facility] and prior to admission appointments with [facility].  Patient has cared for a cho catheter in the past and is aware to call [facility], and [facility], patient familiar with these providers and agreed to schedule follow up.  Patient did ask if he is on a new insulin regimen that he is supplied with needles etc. Writer called discharge [facility] and they do supply what is needed with insulin pens.  Patient identified no further needs at this time and stated his daughter Camille will be picking him up to bring him to his endocrine appointment.    Addendum: 5/2 12:21 p.m.  Writer called [facility]  [phone] they confirmed orders for homecare.      Mary Braden RN, BSN, ACM   Care Transitions Specialist  [facility]  Care Transitions Specialist  [address] [place] [address]  [email or web address]  Office: [phone] Fax: [phone]  [facility]

## 2023-05-02 NOTE — PLAN OF CARE
Physical Therapy Discharge Summary    Reason for therapy discharge:    Discharged to home with home therapy.    Progress towards therapy goal(s). See goals on Care Plan in [facility] electronic health record for goal details.  Goals partially met.  Barriers to achieving goals:   discharge from facility.    Therapy recommendation(s):    Continued therapy is recommended.  Rationale/Recommendations:  To further increase independence with mobility.

## 2023-05-02 NOTE — PLAN OF CARE
Goal Outcome Evaluation:  5/2/23, 7 a to 11.45 a  4/20/23, admit from ICU. DKA     Orientation: Alert and oriented, forgetful, calm and pleasant     Vitals/Tele: BP slightly elevated, on rA, no pain     IV Access/drains: removed     Diet: Mod carb     Mobility: A1 Sba     GI/: Hco chronic, to discharge with Cho     Wound/Skin: No known significant skin issue     Consults: Hospitalist following     Discharge instruction , cho education, diabetes education done, questions encouraged and answered. Patient acknowledged understanding.

## 2023-05-02 NOTE — PLAN OF CARE
A/Ox4. VSS, except slightly HTN. On RA. L PIV, SL. SBA to BR.  Denies pain. On Mod Carb diet. BG Q2 from 12MN-6am. ([phone]) On K protocol with Am labs. Seble in place with adequate UO. Poss discharge today before 12nn, pt has Endocrinology appt at 1pm.     normal...

## 2023-05-03 ENCOUNTER — PATIENT OUTREACH (OUTPATIENT)
Dept: CARE COORDINATION | Facility: CLINIC | Age: 83
End: 2023-05-03
Payer: COMMERCIAL

## 2023-05-03 NOTE — PROGRESS NOTES
Clinic Care Coordination Contact  [facility]/Voicemail    Referral Source: IP Report  Clinical Data: Care Coordinator Outreach. Pt recently hospitalized for issues related to uncontrolled diabetes: Diabetic ketoacidosis. Per notes pt had a recent TCU stay at [facility] (in March) following a hospitalization for a intercranial hemorrage. During this hospitalization, there were concerns about pt discharging home due to concerns that he is not managing his DM well but pt insisted on going home. Pt was discharged home with home health care.  Outreach attempted x 1. Was unable to leave message as no voicemail.   Plan: BERTHA BORDEN will attempt to call pt again in next 1-2 days.    Ronda Enrique [facility]  Social Work Care Coordinator  Phone: [phone]

## 2023-05-03 NOTE — PLAN OF CARE
Occupational Therapy Discharge Summary    Reason for therapy discharge:    Discharged to home with home therapy.    Progress towards therapy goal(s). See goals on Care Plan in [facility] electronic health record for goal details.  Goals partially met.  Barriers to achieving goals:   discharge from facility.    Therapy recommendation(s):    Continued therapy is recommended.  Rationale/Recommendations: Recommend home with A initially with IADL's to insure safety with shopping, community mobility and home RN for med mgmt and OT for home safety eval for ADL/IADL's. Eventual [facility] rec for increased safety at home.

## 2023-05-04 ENCOUNTER — TELEPHONE (OUTPATIENT)
Dept: PHARMACY | Facility: OTHER | Age: 83
End: 2023-05-04

## 2023-05-04 NOTE — TELEPHONE ENCOUNTER
MTM referral from: [facility] visit (referral by provider)    MTM referral outreach attempt #2 on May 4, 2023 at 9:29 AM      Outcome: Patient not reachable after several attempts, will route to [place] Pharmacist/Provider as an FYI.  [facility] scheduling number is [phone].  Thank you for the referral.    Reginald Cage [facility]

## 2023-05-05 ENCOUNTER — PATIENT OUTREACH (OUTPATIENT)
Dept: CARE COORDINATION | Facility: CLINIC | Age: 83
End: 2023-05-05
Payer: COMMERCIAL

## 2023-05-05 NOTE — PROGRESS NOTES
"Clinic Care Coordination Contact    Clinic Care Coordination Contact  OUTREACH    Referral Information: Pt was recently hospitalized at [facility] from 4/20-5/2 for issues related to uncontrolled diabetes: Diabetic ketoacidosis.        Primary Diagnosis: Diabetes    Chief Complaint   Patient presents with     Clinic Care Coordination - Post Hospital     SW Outreach        [facility] Utilization:   Clinic Utilization  Compliance Concerns: Yes  No-Show Concerns: No  No PCP office visit in Past Year: No  Utilization    Hospital Admissions  3             ED Visits  4             No Show Count (past year)  2                Current as of: 5/4/2023 11:01 PM              Clinical Concerns:  Current Medical Concerns: Diabetes mellitus type 2, paroxysmal atrial fibrillation, hypertension, hyperlipidemia, pulmonary hypertension, spontaneous intracranial hemorrhage, recurrent pleural effusion, chronic kidney disease stage III,  Current Behavioral Concerns: Chart notes indicate some concern over pt's understanding of how to manage diabetes and his insulin pump.    Education Provided to patient: BERTHA BORDEN role and availability       Medication Management:  Medication review status: Pt reports that he has all necessary medications at home. He was able to fill his prescriptions. Pt reports that he is checking his blood sugar and BP often since he has been home and reports that his blood sugars are \"in range\" and his BP is \"excellent, the best it's been in years\"     Functional Status: Pt is independent with walking, showering, toileting. Pt did received extensive diabetes education during his recent hospital stay.     Living Situation: Pt lives in a private home with his wife in [place].     Lifestyle & Psychosocial Needs: Pt wants to remain independent and in his home. While at the hospital, he was strongly encouraged to go to a LTC setting for assistance in managing his diabetes, he refused and he did not qualify for TCU. Pt " "lives with his spouse and has support from his children.     Social Determinants of Health     Tobacco Use: Medium Risk (4/20/2023)    Patient History      Smoking Tobacco Use: Former      Smokeless Tobacco Use: Never      Passive Exposure: Not on file   Alcohol Use: Not At Risk (10/27/2020)    AUDIT-C      Frequency of Alcohol Consumption: Never      Average Number of Drinks: Not on file      Frequency of Binge Drinking: Not on file   Financial Resource Strain: Not on file   Food Insecurity: Not on file   Transportation Needs: Not on file   Physical Activity: Not on file   Stress: Not on file   Social Connections: Not on file   Intimate Partner Violence: Not on file   Depression: Not at risk (2/27/2023)    PHQ-2      PHQ-2 Score: 0   Housing Stability: Not on file     Diet:: Diabetic diet  Inadequate nutrition (GOAL):: No  Tube Feeding: No  Inadequate activity/exercise (GOAL):: No  Significant changes in sleep pattern (GOAL): No  Transportation means:: Regular car        Resources and Interventions:  Current Resources: Pt was referred to [facility] home care upon discharge from the hospital for RN, PT and HHA services (he refused OT according to notes.     Spoke with pt over the phone, he reports that he has been feeling \"great\" since he came home from the hospital and has been checking BS and BP often. Pt mentioned that a nurse from [facility] came out to his home but that he \"didn't sign up yet\" and he was going to \"talk to Dr. Moya\" about this first as he wasn't sure it was covered. Pt stated that he worked with [facility] in March/April after leaving TCU and services were eventually closed. When he tried to re-open services just prior to his recent admission, they told him that their contract had ended.     SW tried to explain to pt that it was likely because he had just completed home care with [facility] and he might have not qualified to have it re-opened when he requested it just prior to his " admission. Explained that due to his hospital stay, he should re-qualify for home care. Explained that the hospital stay may have not been able to get him in to [facility] because they could be full and this is why they referred him to a different agency ([facility]). Pt stated that he is waiting to hear back from Dr. Moya on this.    Otherwise, pt denied having add'l questions/concenred for BERTHA CC at this time.     Outreach Frequency: monthly  Future Appointments              In 2 months Cony Julien, [facility] [place], [facility]          Plan: BERTHA CC will coordinate with Dr. Moya and [facility] re: home care. SW CC will plan to follow to ensure home care is started but close pt otherwise, with no add'l outreaches to pt planned.    Ronda Enrique, [facility]  Social Work Care Coordinator  Phone: [phone]

## 2023-05-10 ENCOUNTER — TELEPHONE (OUTPATIENT)
Dept: CARDIOLOGY | Facility: CLINIC | Age: 83
End: 2023-05-10
Payer: COMMERCIAL

## 2023-05-10 ENCOUNTER — TELEPHONE (OUTPATIENT)
Dept: CARDIOLOGY | Facility: CLINIC | Age: 83
End: 2023-05-10

## 2023-05-10 NOTE — TELEPHONE ENCOUNTER
[facility] Call Center    Phone Message    May a detailed message be left on voicemail: yes     Reason for Call: Other:    Patient wants to schedule hospital F/U Please call patient back to schedule in [place].     Action Taken: Other: Cardiology     Travel Screening: Not Applicable     Thank you!  [facility]

## 2023-05-10 NOTE — TELEPHONE ENCOUNTER
5/10/23 - called patient to schedule hospital follow up, patient stated he didn't call for this appointment and didn't know it was needed and didn't want to schedule.

## 2023-05-12 NOTE — TELEPHONE ENCOUNTER
[facility] Call [facility]    Phone Message    May a detailed message be left on voicemail: no     Reason for Call: Other: Tc called back to schedule a post-hospital F/U, he would like to be assigned an TAMAR. Please reach out to Tc at [phone].     Action Taken: Other: [facility] Cardiology    Travel Screening: Not Applicable

## 2023-05-22 ENCOUNTER — TELEPHONE (OUTPATIENT)
Dept: CARDIOLOGY | Facility: CLINIC | Age: 83
End: 2023-05-22
Payer: COMMERCIAL

## 2023-05-22 NOTE — TELEPHONE ENCOUNTER
Pt is scheduled for a [facility] appt on 5/26/23 with Diana    Pt with a history of diastolic heart failure..  Medication list reviewed and pt is not currently on Entresto, Jardiance, Farxiga and Ivokana.    Please initiate coverage check for the above medications.  Thank you!  MEGAN Urban([facility]) 5/22/23

## 2023-05-23 NOTE — TELEPHONE ENCOUNTER
Patient has Medicare Advantage through [employer]    Entresto  --$47/mo  --lf/when total drug costs exceed $4,660, price will increase to a 25% coinsurance, equivalent to $162/mo.    Jardiance/Farxiga  --$47/mo  --lf/when total drug costs exceed $4,660, price will increase to a 25% coinsurance, equivalent to $144/mo.    Alesha Henry  Pharmacy Technician/Liaison, Discharge Pharmacy   [phone] (voice or text)  [email or web address]

## 2023-05-25 ENCOUNTER — VIRTUAL VISIT (OUTPATIENT)
Dept: NEUROLOGY | Facility: CLINIC | Age: 83
End: 2023-05-25
Payer: COMMERCIAL

## 2023-05-25 DIAGNOSIS — I61.5 NONTRAUMATIC INTRAVENTRICULAR INTRACEREBRAL HEMORRHAGE, UNSPECIFIED LATERALITY (H): Primary | ICD-10-CM

## 2023-05-25 DIAGNOSIS — I63.9 CEREBROVASCULAR ACCIDENT (CVA), UNSPECIFIED MECHANISM (H): ICD-10-CM

## 2023-05-25 PROCEDURE — 99214 OFFICE O/P EST MOD 30 MIN: CPT | Mod: 95 | Performed by: NURSE PRACTITIONER

## 2023-05-25 RX ORDER — PROCHLORPERAZINE 25 MG/1
SUPPOSITORY RECTAL
COMMUNITY

## 2023-05-25 RX ORDER — TRAMADOL HYDROCHLORIDE 50 MG/1
TABLET ORAL
COMMUNITY
End: 2023-05-26

## 2023-05-25 NOTE — PROGRESS NOTES
"Virtual Visit Details    Type of service:  Telephone Visit   Phone call duration: 15 minutes   _____________________________________________________________    [facility]         [phone]  _____________________________________________________________      Chief Complaint: Patient presents with:  Stroke      History of Present Illness: Tc Garcia is a 83 year old male presenting for follow-up for stroke and IVH     He was hospitalized at [facility] on 1/15-01/27/23 . Prior to the hospital stay, he had a past medical history of atrial fibrillation on apixaban, HTN, HLD, DM, CKD.    He presented to the hospital with intermittent amnestic episodes and encephalopathy for days. BP in the /120. Head CT showed R lateral ventricle IVH, CTA showed no abnormality. Hemorrhage felt to be related to hypertension. Given K centra for reversal of coagulopathy. MRI showed scattered areas of acute/subacute appearing infarcts within both cerebral hemispheres and the right basal ganglia which were felt to be cardioembolic due to atrial fibrillation and missed apixaban doses.      Anticoagulation continued to be held at the time of discharge due to hemorrhage. He was hospitalized 1/29-1/31/2023 for hyperglycemia, hypotension and ABBIE. Seen by our service in the ED 2/11/2023 with \"seizure like activity\" - per chart review noted to have some stiffening but no extremity shaking and decreased LOC with blood glucose at the time noted to be 33. Head CT showed resolving hemorrhage.      He was discharged to TCU and is now home. He is able to perform ADLs/IADLs including driving without any concerns. He denies any residual deficits from his stroke.     He was hospitalized in April 2023 with DKA. Stroke service was consulted for questions regarding anticoagulation. He was under consideration for/interested in ASPIRE clinical trial; cardiologist ultimately recommended against it. CT and " MRI were obtained during admission with interval improvement in hemorrhage burden and no evidence of underlying lesion. He was restarted on apixaban 5 mg twice daily.     He reports that he is doing fine since his hospitalization. He has been feeling a little short of breath but will be following up with cardiology tomorrow. His blood sugars continue to be labile. He is taking his apixaban; it was held for a short time for a recent back injection. Blood pressure has reportedly been good. The most recent one he checked was 116. He denies any fatigue; he is sleeping well. Appetite is good. He is back to his normal activities.     Stroke Evaluation summarized:  MRI 4/25: no acute pathology, chronic hemorrhage products paralleling the lateral aspect of the right lateral ventricle likely related to the previous hemorrhage arising from the right temporal lobe; No recent hemorrhage; No evidence for enhancing lesion in the right temporal lobe; Generalized brain atrophy and presumed microvascular ischemic changes    MRI 1/15: Right parietal IPH w/ IVH into right lateral ventricle w/ small amount of left lateral ventricle hemorrhage; moderate edema surrounding hemorrhage; scattered acute/subacute infarcts in cerebral hemispheres and right basal ganglia; moderate diffuse volume loss and white matter changes; several probably chronic cerebellar microhemorrhages; right cerebral convexity meningioma     Head CT 1/15:  Large isolated intraventricular hemorrhage of the right lateral ventricle; Minimal interval increase in size of the occipital horn of the right lateral ventricle. Ventricular size and morphology is otherwise no change; Probable moderate sequela of chronic small vessel ischemic disease.    Head vessels: focal moderate bilateral P1 stenoses   Neck vessels: no LVO or significant stenosis     Echo 4/21: EF 60-65%, atrial fibrillation, severely dilated atria, no evidence of atrial shunt   EKG/Tele: atrial fibrillation    LDL: 52  A1c: 8.0    Modified Raimundo Scale  Score: 0-No symptoms    Impression:   Problem List Items Addressed This Visit     Nontraumatic intraventricular intracerebral hemorrhage (H) - Primary   Other Visit Diagnoses     Cerebrovascular accident (CVA), unspecified mechanism (H)             84 y/o man with history of primary spontaneous right IVH in the setting of apixaban use and hypertension, multifocal bihemispheric ischemic strokes d/t afib, 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    Stroke Prevention Recommendations  High blood pressure, or hypertension, is a leading cause of stroke and the most significant controllable risk factor. You should aim to keep your blood pressure below 130/80.     Smoking cessation: The nicotine and carbon monoxide in cigarette smoke damage the cardiovascular system and pave the way for a stroke. If you use nicotine, please reach out to your primary care provider for assistance with quitting or consider utilizing one of [place]'s free quit support programs (https://www.health.[facility].[place]/communities/tobacco/quitting/index.html)    If you have Type 1 or 2 diabetes, control you blood sugar. You should aim to keep your HGBA1C below 7.0.     Eat an overall health dietary pattern that emphasizes:  - a wide variety of fruits and vegetables   - whole grains and products made up of mostly whole grains   - healthy sources of protein (mostly plants such as legumes and nuts; fish and seafood; low-fat or non-fat dairy; and, if you eat meat and poultry, ensuring it is lean  "and unprocessed)  - liquid non-tropical vegetable oils  - minimally processed foods   - minimize intake of added sugars  - foods prepared with little or no salt  - limited or preferably no alcohol intake    Aim for being active at least 150 minutes a week, but if you don't want to sweat the numbers, just move more and sit less.    Excess body weight and obesity are linked with an increased risk of high blood pressure, diabetes, heart disease, and stroke. Losing as little as 5 to 10 pounds can make a significant difference in your risks.  [employer] [place] has a Comprehensive Weight Management program if you would like assistance/support: https://www.[facility].org/treatments/comprehensive-weight-management    Large amounts of cholesterol in the blood can build up and cause blood clots - leading to a stroke. Aim to keep your LDL cholesterol between 40 and 70.     Call 911 if these signs are present            Stroke Education provided.  He will call us with any questions.  For any acute neurologic deficits he was advised to  go directly to the hospital rather than call the clinic.    Ame Carvajal NP  Neurology  06/09/2023 5:00 PM  To page me or covering stroke neurology team member, click here: AMCOM  Choose \"On Call\" tab at top, then search dropdown box for \"Neurology Adult\" & press Enter, look for Neuro ICU/Stroke    ___________________________________________________________________    Current Medications  Current Outpatient Medications   Medication     acetaminophen (TYLENOL) 325 MG tablet     apixaban ANTICOAGULANT (ELIQUIS) 5 MG tablet     carvedilol (COREG) 12.5 MG tablet     Continuous Blood Gluc  (DEXCOM G6 ) JOSE     finasteride (PROSCAR) 5 MG tablet     glucagon 1 MG kit     HUMALOG 100 UNIT/ML injection     INSULIN PUMP - OUTPATIENT     irbesartan (AVAPRO) 150 MG tablet     furosemide (LASIX) 40 MG tablet     potassium chloride ER (K-TAB/KLOR-CON) 10 MEQ CR tablet     tamsulosin " (FLOMAX) 0.4 MG capsule     No current facility-administered medications for this visit.       Past Medical History  Past Medical History:   Diagnosis Date     Anemia      Anemia of chronic disease 5/14/2020     Back pain      CKD (chronic kidney disease) stage 3, GFR 30-59 ml/min (H)      CRF (chronic renal failure), stage 3  5/14/2020     Fall 11/2019    suffered multiple left rib fractures, C3 and T2 fractures     Mixed hyperlipidemia 7/7/2004     Paroxysmal atrial fibrillation (H)      Personal history of colonic polyps 1972    gets colonoscopy every five years, due in 2006     Pleural effusion on left 11/2019    after multiple rib fractures     Pulmonary hypertension (H) 5/10/2020    Added automatically from request for surgery [id number]     Recurrent Left Pleural effusion -- S/P Pleurex Cath 5/12/20 12/30/2019     Rosacea 1989     Type II or unspecified type diabetes mellitus without mention of complication, not stated as uncontrolled 1999     Unspecified essential hypertension 1994       Social History  Social History     Tobacco Use     Smoking status: Former     Packs/day: 0.20     Years: 40.00     Pack years: 8.00     Types: Cigarettes     Start date: 1968     Quit date: 1/1/2008     Years since quitting: 15.4     Smokeless tobacco: Never   Vaping Use     Vaping status: Never Used   Substance Use Topics     Alcohol use: Not Currently     Alcohol/week: 35.0 standard drinks of alcohol     Drug use: Not Currently       Physical Exam        12/13/2021     7:57 PM 2/27/2023     8:48 AM 5/25/2023    11:34 AM   Vitals - Patient Reported   Weight (Patient Reported)  145 lb 155 lb   Systolic (Patient Reported)  178 119   Diastolic (Patient Reported)  97 60   My weight today is: 163 lbs                 Neurologic Exam:  Phone-only visit. Speech was clear and fluent.     Neuroimaging: as per HPI. I personally reviewed those images.    Labs:    Coagulation studies:  Recent Labs   Lab Test 01/15/23  1321 11/21/20  1146  07/17/20  0950   INR 1.13 1.50* 1.18*        Lipid panel:  Recent Labs   Lab Test 01/17/23  0436 05/13/20  0553   CHOL 112 116   HDL 50 60   LDL 52 43   TRIG 51 65       HbA1C:  Recent Labs   Lab Test 04/20/23  1804 01/17/23  0436 11/20/20  2233   A1C 8.0* 7.2* 7.7*       Troponin:  Recent Labs   Lab Test 03/17/21  0752 07/08/20  1223 05/21/20  1302   TROPI <0.015 <0.015 0.034       Billing:    I spent a total of 30 minutes on the day of the visit.   Time spent by me doing chart review, history and exam, documentation and further activities per the note

## 2023-05-25 NOTE — LETTER
"    5/25/2023         RE: Tc Garcia  [id number] [address] [id number]        Dear Colleague,    Thank you for referring your patient, Tc Garcia, to the [facility] NEUROLOGY [facility]. Please see a copy of my visit note below.    Virtual Visit Details    Type of service:  Telephone Visit   Phone call duration: 15 minutes   _____________________________________________________________    [facility]         [phone]  _____________________________________________________________      Chief Complaint: Patient presents with:  Stroke      History of Present Illness: Tc Garcia is a 83 year old male presenting for follow-up for stroke and IVH     He was hospitalized at [facility] on 1/15-01/27/23 . Prior to the hospital stay, he had a past medical history of atrial fibrillation on apixaban, HTN, HLD, DM, CKD.    He presented to the hospital with intermittent amnestic episodes and encephalopathy for days. BP in the /120. Head CT showed R lateral ventricle IVH, CTA showed no abnormality. Hemorrhage felt to be related to hypertension. Given K centra for reversal of coagulopathy. MRI showed scattered areas of acute/subacute appearing infarcts within both cerebral hemispheres and the right basal ganglia which were felt to be cardioembolic due to atrial fibrillation and missed apixaban doses.      Anticoagulation continued to be held at the time of discharge due to hemorrhage. He was hospitalized 1/29-1/31/2023 for hyperglycemia, hypotension and ABBIE. Seen by our service in the ED 2/11/2023 with \"seizure like activity\" - per chart review noted to have some stiffening but no extremity shaking and decreased LOC with blood glucose at the time noted to be 33. Head CT showed resolving hemorrhage.      He was discharged to TCU and is now home. He is able to perform ADLs/IADLs including driving without any concerns. He denies any residual deficits " from his stroke.     He was hospitalized in April 2023 with DKA. Stroke service was consulted for questions regarding anticoagulation. He was under consideration for/interested in ASPIRE clinical trial; cardiologist ultimately recommended against it. CT and MRI were obtained during admission with interval improvement in hemorrhage burden and no evidence of underlying lesion. He was restarted on apixaban 5 mg twice daily.     He reports that he is doing fine since his hospitalization. He has been feeling a little short of breath but will be following up with cardiology tomorrow. His blood sugars continue to be labile. He is taking his apixaban; it was held for a short time for a recent back injection. Blood pressure has reportedly been good. The most recent one he checked was 116. He denies any fatigue; he is sleeping well. Appetite is good. He is back to his normal activities.     Stroke Evaluation summarized:  MRI 4/25: no acute pathology, chronic hemorrhage products paralleling the lateral aspect of the right lateral ventricle likely related to the previous hemorrhage arising from the right temporal lobe; No recent hemorrhage; No evidence for enhancing lesion in the right temporal lobe; Generalized brain atrophy and presumed microvascular ischemic changes    MRI 1/15: Right parietal IPH w/ IVH into right lateral ventricle w/ small amount of left lateral ventricle hemorrhage; moderate edema surrounding hemorrhage; scattered acute/subacute infarcts in cerebral hemispheres and right basal ganglia; moderate diffuse volume loss and white matter changes; several probably chronic cerebellar microhemorrhages; right cerebral convexity meningioma     Head CT 1/15:  Large isolated intraventricular hemorrhage of the right lateral ventricle; Minimal interval increase in size of the occipital horn of the right lateral ventricle. Ventricular size and morphology is otherwise no change; Probable moderate sequela of chronic small  vessel ischemic disease.    Head vessels: focal moderate bilateral P1 stenoses   Neck vessels: no LVO or significant stenosis     Echo 4/21: EF 60-65%, atrial fibrillation, severely dilated atria, no evidence of atrial shunt   EKG/Tele: atrial fibrillation   LDL: 52  A1c: 8.0    Modified Underwood Scale  Score: 0-No symptoms    Impression:   Problem List Items Addressed This Visit     Nontraumatic intraventricular intracerebral hemorrhage (H) - Primary   Other Visit Diagnoses     Cerebrovascular accident (CVA), unspecified mechanism (H)             82 y/o man with history of primary spontaneous right IVH in the setting of apixaban use and hypertension, multifocal bihemispheric ischemic strokes d/t afib, several 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    Stroke Prevention Recommendations  High blood pressure, or hypertension, is a leading cause of stroke and the most significant controllable risk factor. You should aim to keep your blood pressure below 130/80.     Smoking cessation: The nicotine and carbon monoxide in cigarette smoke damage the cardiovascular system and pave the way for a stroke. If you use nicotine, please reach out to your primary care provider for assistance with quitting or consider utilizing one of [place]'s free quit support programs (https://www.[email or web address]/communities/tobacco/quitting/index.html)    If you have Type 1 or 2 diabetes, control you blood sugar. You should aim to keep your HGBA1C below 7.0.     Eat an overall health dietary pattern that emphasizes:  - a wide  "variety of fruits and vegetables   - whole grains and products made up of mostly whole grains   - healthy sources of protein (mostly plants such as legumes and nuts; fish and seafood; low-fat or non-fat dairy; and, if you eat meat and poultry, ensuring it is lean and unprocessed)  - liquid non-tropical vegetable oils  - minimally processed foods   - minimize intake of added sugars  - foods prepared with little or no salt  - limited or preferably no alcohol intake    Aim for being active at least 150 minutes a week, but if you don't want to sweat the numbers, just move more and sit less.    Excess body weight and obesity are linked with an increased risk of high blood pressure, diabetes, heart disease, and stroke. Losing as little as 5 to 10 pounds can make a significant difference in your risks.  [employer] has a Comprehensive Weight Management program if you would like assistance/support: https://www.[employer].org/treatments/comprehensive-weight-management    Large amounts of cholesterol in the blood can build up and cause blood clots - leading to a stroke. Aim to keep your LDL cholesterol between 40 and 70.     Call 911 if these signs are present            Stroke Education provided.  He will call us with any questions.  For any acute neurologic deficits he was advised to  go directly to the hospital rather than call the clinic.    Ame Carvajal NP  Neurology  06/09/2023 5:00 PM  To page me or covering stroke neurology team member, click here: AMCOM  Choose \"On Call\" tab at top, then search dropdown box for \"Neurology Adult\" & press Enter, look for Neuro ICU/Stroke    ___________________________________________________________________    Current Medications  Current Outpatient Medications   Medication     acetaminophen (TYLENOL) 325 MG tablet     apixaban ANTICOAGULANT (ELIQUIS) 5 MG tablet     carvedilol (COREG) 12.5 MG tablet     Continuous Blood Gluc  (DEXCOM G6 ) JOSE     " finasteride (PROSCAR) 5 MG tablet     glucagon 1 MG kit     HUMALOG 100 UNIT/ML injection     INSULIN PUMP - OUTPATIENT     irbesartan (AVAPRO) 150 MG tablet     furosemide (LASIX) 40 MG tablet     potassium chloride ER (K-TAB/KLOR-CON) 10 MEQ CR tablet     tamsulosin (FLOMAX) 0.4 MG capsule     No current facility-administered medications for this visit.       Past Medical History  Past Medical History:   Diagnosis Date     Anemia      Anemia of chronic disease 5/14/2020     Back pain      CKD (chronic kidney disease) stage 3, GFR 30-59 ml/min (H)      CRF (chronic renal failure), stage 3  5/14/2020     Fall 11/2019    suffered multiple left rib fractures, C3 and T2 fractures     Mixed hyperlipidemia 7/7/2004     Paroxysmal atrial fibrillation (H)      Personal history of colonic polyps 1972    gets colonoscopy every five years, due in 2006     Pleural effusion on left 11/2019    after multiple rib fractures     Pulmonary hypertension (H) 5/10/2020    Added automatically from request for surgery [id number]     Recurrent Left Pleural effusion -- S/P Pleurex Cath 5/12/20 12/30/2019     Rosacea 1989     Type II or unspecified type diabetes mellitus without mention of complication, not stated as uncontrolled 1999     Unspecified essential hypertension 1994       Social History  Social History     Tobacco Use     Smoking status: Former     Packs/day: 0.20     Years: 40.00     Pack years: 8.00     Types: Cigarettes     Start date: 1968     Quit date: 1/1/2008     Years since quitting: 15.4     Smokeless tobacco: Never   Vaping Use     Vaping status: Never Used   Substance Use Topics     Alcohol use: Not Currently     Alcohol/week: 35.0 standard drinks of alcohol     Drug use: Not Currently       Physical Exam        12/13/2021     7:57 PM 2/27/2023     8:48 AM 5/25/2023    11:34 AM   Vitals - Patient Reported   Weight (Patient Reported)  145 lb 155 lb   Systolic (Patient Reported)  178 119   Diastolic (Patient Reported)  97  60   My weight today is: 163 lbs                 Neurologic Exam:  Phone-only visit. Speech was clear and fluent.     Neuroimaging: as per HPI. I personally reviewed those images.    Labs:    Coagulation studies:  Recent Labs   Lab Test 01/15/23  1321 11/21/20  1146 07/17/20  0950   INR 1.13 1.50* 1.18*        Lipid panel:  Recent Labs   Lab Test 01/17/23  0436 05/13/20  0553   CHOL 112 116   HDL 50 60   LDL 52 43   TRIG 51 65       HbA1C:  Recent Labs   Lab Test 04/20/23  1804 01/17/23  0436 11/20/20  2233   A1C 8.0* 7.2* 7.7*       Troponin:  Recent Labs   Lab Test 03/17/21  0752 07/08/20  1223 05/21/20  1302   TROPI <0.015 <0.015 0.034       Billing:    I spent a total of 30 minutes on the day of the visit.   Time spent by me doing chart review, history and exam, documentation and further activities per the note        Again, thank you for allowing me to participate in the care of your patient.        Sincerely,        Ame Carvajal NP

## 2023-05-25 NOTE — NURSING NOTE
Is the patient currently in the state of [place]? YES    Visit mode:TELEPHONE    If the visit is dropped, the patient can be reconnected by: TELEPHONE VISIT: Phone number: [phone]    Will anyone else be joining the visit? NO      How would you like to obtain your AVS? MyChart    Are changes needed to the allergy or medication list? NO    Reason for visit: Stroke    MONICA ALLISON, [facility]

## 2023-05-26 ENCOUNTER — LAB (OUTPATIENT)
Dept: LAB | Facility: CLINIC | Age: 83
End: 2023-05-26
Payer: COMMERCIAL

## 2023-05-26 ENCOUNTER — OFFICE VISIT (OUTPATIENT)
Dept: CARDIOLOGY | Facility: CLINIC | Age: 83
End: 2023-05-26
Attending: NURSE PRACTITIONER
Payer: COMMERCIAL

## 2023-05-26 VITALS
DIASTOLIC BLOOD PRESSURE: 62 MMHG | WEIGHT: 166.5 LBS | SYSTOLIC BLOOD PRESSURE: 146 MMHG | BODY MASS INDEX: 24.66 KG/M2 | OXYGEN SATURATION: 100 % | HEART RATE: 54 BPM | HEIGHT: 69 IN

## 2023-05-26 DIAGNOSIS — I50.32 CHRONIC HEART FAILURE WITH PRESERVED EJECTION FRACTION (HFPEF) (H): ICD-10-CM

## 2023-05-26 DIAGNOSIS — I27.20 PULMONARY HYPERTENSION (H): ICD-10-CM

## 2023-05-26 DIAGNOSIS — I10 ESSENTIAL HYPERTENSION: ICD-10-CM

## 2023-05-26 DIAGNOSIS — E78.2 MIXED HYPERLIPIDEMIA: ICD-10-CM

## 2023-05-26 DIAGNOSIS — I07.1 TRICUSPID VALVE INSUFFICIENCY, UNSPECIFIED ETIOLOGY: ICD-10-CM

## 2023-05-26 DIAGNOSIS — N18.32 STAGE 3B CHRONIC KIDNEY DISEASE (H): ICD-10-CM

## 2023-05-26 DIAGNOSIS — I50.9 CONGESTIVE HEART FAILURE, UNSPECIFIED HF CHRONICITY, UNSPECIFIED HEART FAILURE TYPE (H): ICD-10-CM

## 2023-05-26 LAB
ANION GAP SERPL CALCULATED.3IONS-SCNC: 6 MMOL/L (ref 7–15)
BUN SERPL-MCNC: 13.9 MG/DL (ref 8–23)
CALCIUM SERPL-MCNC: 9.3 MG/DL (ref 8.8–10.2)
CHLORIDE SERPL-SCNC: 104 MMOL/L (ref 98–107)
CREAT SERPL-MCNC: 1.27 MG/DL (ref 0.67–1.17)
DEPRECATED HCO3 PLAS-SCNC: 29 MMOL/L (ref 22–29)
GFR SERPL CREATININE-BSD FRML MDRD: 56 ML/MIN/1.73M2
GLUCOSE SERPL-MCNC: 131 MG/DL (ref 70–99)
NT-PROBNP SERPL-MCNC: 3829 PG/ML (ref 0–1800)
POTASSIUM SERPL-SCNC: 4.2 MMOL/L (ref 3.4–5.3)
SODIUM SERPL-SCNC: 139 MMOL/L (ref 136–145)

## 2023-05-26 PROCEDURE — 99215 OFFICE O/P EST HI 40 MIN: CPT | Performed by: PHYSICIAN ASSISTANT

## 2023-05-26 PROCEDURE — 36415 COLL VENOUS BLD VENIPUNCTURE: CPT | Performed by: NURSE PRACTITIONER

## 2023-05-26 PROCEDURE — 80048 BASIC METABOLIC PNL TOTAL CA: CPT | Performed by: NURSE PRACTITIONER

## 2023-05-26 PROCEDURE — 83880 ASSAY OF NATRIURETIC PEPTIDE: CPT | Performed by: NURSE PRACTITIONER

## 2023-05-26 RX ORDER — FUROSEMIDE 40 MG
40 TABLET ORAL DAILY
Qty: 90 TABLET | Refills: 3 | Status: SHIPPED | OUTPATIENT
Start: 2023-05-26 | End: 2023-01-01

## 2023-05-26 RX ORDER — POTASSIUM CHLORIDE 750 MG/1
10 TABLET, EXTENDED RELEASE ORAL DAILY
Qty: 90 TABLET | Refills: 3 | Status: ON HOLD | OUTPATIENT
Start: 2023-05-26 | End: 2023-01-01

## 2023-05-26 NOTE — LETTER
5/26/2023    Jessika Ratliff Adalid  [address]  [place] [address]    RE: Tc Garcia       Dear Colleague,     I had the pleasure of seeing Tc Garcia in the [facility].    Cardiology Clinic Progress Note - [facility] (Heart Failure Specialty)    Tc Garcia MRN# [id number]   YOB: 1940 Age: 83 year old   Primary cardiologist: Dr. Julien         Assessment and Plan:     In summary, Tc Garcia presents to the [facility] clinic for follow up after a recent hospitalization for DKA. He was re-started on Lasix during his stay for his chronic HFpEF / severe PHTN, maintained on vasodilator therapy with Irbesartan, as well as Coreg for BP control. He has also been re-started on Eliquis, which had previously been held after he sustained a nontraumatic intracerebral hemorrhage earlier this year, suspected related to labile blood pressures.      Today, he appears volume up at 166 lbs. BP fairly well controlled. Not taking furosemide as prescribed. Reviewed cardiac meds in detail today.     Plan:  - Take furosemide 40 mg daily.   - Start potassium 10 meq daily.   - Referral for watchman implant (with Dr. Guillaume or Dr. Beck) can be considered in future discussion.  Follow-up:  - With me in 2 weeks. Instructed to bring meds in to clinic with him.    - As scheduled with Dr. Julien in July.        History of Presenting Illness:      Tc Garcia is a pleasant 83 year old patient who presents today for a [facility] clinic follow up visit.     The patient has a complex history of the following -   # Chronic HFpEF, with multiple admissions in 2020. Enrolled in MHT with goal wt 155-163 lbs.  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 Has declined pulmonary rehab.   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 Anticoagulated with Eliquis.  # Poorly-controlled DMII  # HTN  # HLP  # CKD with variable renal function response to diuretics, follows with [facility].   # Anemia of chronic disease, requiring intermittent Fe infusions, followed by nephrology  # Obesity  # Hx of medication confusion and non-compliance, self-adjusts diuretics frequently.  # Social - Lives with wife, Radha. Sedentary. High sodium diet, medication noncompliance, some concern over cognitive function and medical literacy.    Please see my Clinic note dated 11/12/2021 for Tc's detailed history.  In brief, Tc had multiple admissions in the summer 2020 for acute HFpEF and a reaccumulating pleural effusion.  Since then, he has been followed closely in the [facility] clinic, and has remained out of the hospital until March 2021 when he was admitted for dehydration after he had 9 teeth extracted.  His losartan, Lasix, and spironolactone were all held, and then slowly readministered.  However after that, his spironolactone was discontinued completely due to hyperkalemia.    Of note, his creatinine has been quite variable, ranging from 1.3 - 1.9 over the past year.  He is followed by nephrology.    Unfortunately, he has again had a recently tumultuous course.    In January 2023, the patient was admitted to [facility] several times. He presented initially on 1/15/23 with weakness and altered mental status and was found to have nontraumatic intraventricular intracerebral hemorrhage in the setting of hypertension and anticoagulation with apixaban. He received Kcentra for anticoagulation reversal. He re-presented on 1/29/23 with very labile blood sugars going from the 600s to under 50 after treatment. He was also hypotensive initially in the setting of hypovolemia and had some issues with labile blood  "pressures during his stay as well. Several adjustments were made to medications, including discontinuation of apixaban. Irbesratan was decreased from 150 mg to 75 mg once daily. He was also previously on lasix 40 mg once daily as well as amlodipine at 10 mg once daily and these were both stopped.       He was later evaluated again in the Emergency Department on 2/11/2023 with \"seizure like activity\" - per chart review noted to have some stiffening but no extremity shaking and decreased level of consciousness with blood glucose at the time noted to be 33. Head CT showed resolving hemorrhage.     He was then admitted in April with diabetic ketoacidosis and ABBIE. He had some issues with worsening dyspnea while there. TTE during his stay showed stable preserved EF with moderately dilated RV, moderately severe TR and moderate-severe PHTN. He was placed back on Lasix at 20 mg daily. Additionally, neuro saw him and placed him back on Eliquis due to resolved hemorrhage. Note states they felt his prior CVA was related to suboptimally treated HTN.   Of note, it was strongly recommended that he discharged to an LTAC, but he stated he would rather die at home then go to an LTAC for stabilization.  It does not appear that hospice care has been discussed with him.  He is DNR/DNI CODE STATUS.    Seen by neurology in clinic yesterday, note incomplete.     Today, Tc returns to clinic stating he's doing okay. His weight is up significantly at 166 lbs on our clinic scale (140 lbs at hospital discharge, states 155 at home). Significant peripheral edema and worsening exertional dyspnea. Significant med compliance concerns. States daughter helping him but I think she's only helping with the insulin adjustments. Not taking his amlodipine, which had previously been re-started by my colleague Rodrigo. Taking furosemide 40 mg 2-3 x per week at night. /62, HR 50's. creat stable at 1.27. proBNP ~4K. Glucose controlled at 131.  States BP at " "home has been under good control.          Review of Systems:     12-pt ROS is negative except for as noted in the HPI.          Physical Exam:     Vitals: BP (!) 146/62 (BP Location: Right arm, Patient Position: Sitting, Cuff Size: Adult Regular)   Pulse 54   Ht 1.753 m (5' 9\")   Wt 75.5 kg (166 lb 8 oz)   SpO2 100%   BMI 24.59 kg/m    Wt Readings from Last 10 Encounters:   05/26/23 75.5 kg (166 lb 8 oz)   05/02/23 63.6 kg (140 lb 3.2 oz)   03/07/23 69.3 kg (152 lb 12.8 oz)   02/17/23 69.9 kg (154 lb)   02/16/23 69.9 kg (154 lb)   02/13/23 69.9 kg (154 lb 1.6 oz)   02/11/23 72.1 kg (159 lb)   02/10/23 69.4 kg (153 lb)   02/09/23 69.5 kg (153 lb 3.2 oz)   02/06/23 68.9 kg (151 lb 12.8 oz)       Constitutional:  Patient is pleasant, alert, cooperative, and in NAD.  HEENT:  NCAT. PERRLA. EOM's intact.   Neck:  CVP appears normal. No carotid bruits.   Pulmonary: Normal respiratory effort. Chronic L sided crackles (fibrosis).   Cardiac: Irregularly irregular, variable S1, no S3/S4, no murmur or rub.   Abdomen:  Non-tender abdomen, no hepatosplenomegaly appreciated.   Vascular: Pulses in the upper and lower extremities are 2+ and equal bilaterally.  Extremities: 2+ pitting edema pretibial to knees bilaterally.  Skin:  No rashes or lesions appreciated.   Neurological:  No gross motor or sensory deficits.   Psych: Appropriate affect.        Data:     Labs reviewed:  Recent Labs   Lab Test 05/26/23  0910 01/17/23  0436 01/15/23  1321 06/08/22  0853 03/17/21  0752 02/09/21  1306 09/02/20  1044 08/10/20  1027 06/16/20  1313 05/13/20  0553 05/12/20  0541 05/11/20  0542   LDL  --  52  --   --   --   --   --   --   --  43  --   --    HDL  --  50  --   --   --   --   --   --   --  60  --   --    NHDL  --  62  --   --   --   --   --   --   --  56  --   --    CHOL  --  112  --   --   --   --   --   --   --  116  --   --    TRIG  --  51  --   --   --   --   --   --   --  65  --   --    TSH  --   --  3.11  --  0.94  --   --  " 4.75*  --   --    < >  --    NTBNP 3,829*  --   --  2,832*  --  3,149*   < >  --    < >  --   --   --    IRON  --   --   --   --   --   --   --   --   --   --   --  16*   FEB  --   --   --   --   --   --   --   --   --   --   --  161*   IRONSAT  --   --   --   --   --   --   --   --   --   --   --  10*   ERNESTO  --   --   --   --   --   --   --   --   --   --   --  142    < > = values in this interval not displayed.       Lab Results   Component Value Date    WBC 7.9 05/01/2023    WBC 10.4 06/15/2021    RBC 4.52 05/01/2023    RBC 4.08 (A) 06/15/2021    HGB 12.4 (L) 05/01/2023    HGB 11.7 (A) 06/15/2021    HCT 38.4 (L) 05/01/2023    HCT 36.3 (A) 06/15/2021    MCV 85 05/01/2023    MCV 89 06/15/2021    MCH 27.4 05/01/2023    MCH 28.7 06/15/2021    MCHC 32.3 05/01/2023    MCHC 32.2 06/15/2021    RDW 14.8 05/01/2023    RDW 14.2 06/15/2021     05/01/2023     06/15/2021       Lab Results   Component Value Date     05/26/2023     07/12/2021    POTASSIUM 4.2 05/26/2023    POTASSIUM 4.2 01/17/2023    POTASSIUM 4.0 07/12/2021    CHLORIDE 104 05/26/2023    CHLORIDE 105 01/17/2023    CHLORIDE 102 11/04/2021    CHLORIDE 100 07/12/2021    CO2 29 05/26/2023    CO2 25 01/17/2023    CO2 26 07/12/2021    ANIONGAP 6 (L) 05/26/2023    ANIONGAP 12 01/17/2023    ANIONGAP 4 03/18/2021     (H) 05/26/2023     (H) 05/02/2023     (H) 01/17/2023     (A) 07/12/2021    BUN 13.9 05/26/2023    BUN 36 (H) 01/17/2023    BUN 18 07/12/2021    BUN 10 07/12/2021    CR 1.27 (H) 05/26/2023    CR 1.73 (A) 07/12/2021    GFRESTIMATED 56 (L) 05/26/2023    GFRESTIMATED 40 (L) 06/21/2021    GFRESTBLACK 46 (L) 06/21/2021    LLOYD 9.3 05/26/2023    LLOYD 9.3 07/12/2021      Lab Results   Component Value Date    AST 16 04/20/2023    AST 12 03/22/2021    ALT 6 (L) 04/20/2023    ALT 5 03/22/2021       Lab Results   Component Value Date    A1C 8.0 (H) 04/20/2023    A1C 7.7 (H) 11/20/2020       Lab Results   Component  Value Date    INR 1.13 01/15/2023    INR 1.50 (H) 11/21/2020    INR 1.18 (H) 07/17/2020           Problem List:     Patient Active Problem List   Diagnosis    DM type 2, Hgb A1C 8.2 on 4/25/20    Mixed hyperlipidemia    Essential hypertension    Pain in limb    Plantar fascial fibromatosis    HYPERLIPIDEMIA LDL GOAL <100    Hemothorax on left    Recurrent Left Pleural effusion -- S/P Pleurex Cath 5/12/20    ABBIE (acute kidney injury) (H)    Acute respiratory failure with hypoxia (H)    Pulmonary hypertension (H)    CRF (chronic renal failure), stage 3     Anemia of chronic disease    Atrial fibrillation/flutter -- on Eliquis and Amiodarone    DKA (diabetic ketoacidoses)    Anemia in CKD (chronic kidney disease)    Dyspnea    SOB (shortness of breath)    Non-recurrent bilateral inguinal hernia without obstruction or gangrene    Acute cholecystitis    Abdominal pain, generalized    Non-intractable vomiting with nausea, unspecified vomiting type    Alcohol dependence (H)    CHF (congestive heart failure) (H)    Syncope and collapse    Weakness    Postoperative state    Acute kidney injury (H)    Chronic diastolic heart failure (H)    Nontraumatic intraventricular intracerebral hemorrhage (H)    Anticoagulated    Right-sided nontraumatic intraventricular intracerebral hemorrhage (H)    Dehydration    Hypoglycemia    Hypoxia    DNR (do not resuscitate)    Hypotension, unspecified hypotension type    Diabetic nephropathy associated with type 2 diabetes mellitus (H)    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, or unspecified(403.00)    Nephrotic range proteinuria    Secondary hyperparathyroidism of renal origin (H)    Stage 3b chronic kidney disease (H)    Vitamin D deficiency    Diabetic ketoacidosis without coma associated with type 2 diabetes mellitus (H)           Medications:     Current Outpatient Medications   Medication Sig Dispense Refill    acetaminophen (TYLENOL)  325 MG tablet Take 2 tablets (650 mg) by mouth every 6 hours as needed for mild pain or fever  0    amLODIPine (NORVASC) 5 MG tablet Take 1 tablet (5 mg) by mouth daily (Patient taking differently: Take 5 mg by mouth every morning)  0    apixaban ANTICOAGULANT (ELIQUIS) 5 MG tablet Take 1 tablet (5 mg) by mouth 2 times daily 60 tablet 0    carvedilol (COREG) 12.5 MG tablet Take 1 tablet (12.5 mg) by mouth 2 times daily (with meals) 180 tablet 3    Continuous Blood Gluc  (DEXCOM G6 ) JOSE Dexcom G6    USE EACH WITH 10 DAYS SENSORS      furosemide (LASIX) 20 MG tablet Take 1 tablet (20 mg) by mouth daily (Patient taking differently: Take 20 mg by mouth daily Has 40 mg tab. States he takes 1 tab 2-3 times a week. (As of 5/26/23)) 30 tablet 0    glucagon 1 MG kit 1 mg as needed for low blood sugar      HUMALOG 100 UNIT/ML injection For refilling insulin pump      INSULIN PUMP - OUTPATIENT Medtronic device with no CGM and site change every 3 days   Sensitivity factor (midnight to midnight): 55  Bolus (units:gram): 7784-2963 = 1:9, 4131-3153 = 1:7, 0732-7277 = 1:7, 8226-1971 = 1:9, 4747-5062 = 1:13  Basal (units/hour): 6347-9246 = 0.45, 2988-8213 = 0.5, 3728-7239 = 0.625, 0602-8794 = 0.6, 4600-0436 = 0.45      irbesartan (AVAPRO) 150 MG tablet Take 1 tablet (150 mg) by mouth At Bedtime 90 tablet 3    finasteride (PROSCAR) 5 MG tablet Take 1 tablet (5 mg) by mouth daily (Patient not taking: Reported on 5/26/2023)  0    tamsulosin (FLOMAX) 0.4 MG capsule Take 1 capsule (0.4 mg) by mouth daily (Patient not taking: Reported on 5/26/2023) 30 capsule 0           Past Medical History:     Past Medical History:   Diagnosis Date    Anemia     Anemia of chronic disease 5/14/2020    Back pain     CKD (chronic kidney disease) stage 3, GFR 30-59 ml/min (H)     CRF (chronic renal failure), stage 3  5/14/2020    Fall 11/2019    suffered multiple left rib fractures, C3 and T2 fractures    Mixed hyperlipidemia  2004    Paroxysmal atrial fibrillation (H)     Personal history of colonic polyps 1972    gets colonoscopy every five years, due in     Pleural effusion on left 2019    after multiple rib fractures    Pulmonary hypertension (H) 5/10/2020    Added automatically from request for surgery [id number]    Recurrent Left Pleural effusion -- S/P Pleurex Cath 2019    Rosacea     Type II or unspecified type diabetes mellitus without mention of complication, not stated as uncontrolled     Unspecified essential hypertension      Past Surgical History:   Procedure Laterality Date    ANESTHESIA CARDIOVERSION N/A 2/3/2020    Procedure: ANESTHESIA, FOR CARDIOVERSION;  Surgeon: GENERIC ANESTHESIA PROVIDER;  Location:  OR    CV RIGHT HEART CATH MEASUREMENTS RECORDED N/A 2020    Procedure: Right Heart Cath;  Surgeon: Senthil Silva MD;  Location:  HEART CARDIAC CATH LAB    HC REMOVE TONSILS/ADENOIDS,<13 Y/O      T & A <11y.o.    IR CHEST TUBE DRAIN TUNNELED LEFT  2020    IR CHEST TUBE PLACEMENT NON-TUNNELLED LEFT  2020    IR CHEST TUBE REMOVAL NON TUNNELED LEFT  2020    IR CHEST TUBE REMOVAL TUNNELED LEFT  2020    LAPAROSCOPIC CHOLECYSTECTOMY N/A 2020    Procedure: CHOLECYSTECTOMY, LAPAROSCOPIC;  Surgeon: Annette Lambert MD;  Location:  OR    LAPAROSCOPIC HERNIORRHAPHY INGUINAL BILATERAL Bilateral 10/27/2020    Procedure: LAPAROSCOPIC BILATERAL INGUINAL HERNIA REPAIR WITH MESH;  Surgeon: Brian Garsia MD;  Location:  OR     Family History   Problem Relation Age of Onset    Family History Negative Mother          age 71    Family History Negative Father          age 70    Diabetes Brother         alive age 77    Diabetes Brother         alive age 76    Family History Negative Brother             Family History Negative Brother             Diabetes Sister         alive age74    Family History Negative Sister           age 86    Heart Disease Sister             Family History Negative Sister             Family History Negative Sister             Diabetes Sister     Diabetes Sister     Diabetes Brother     Diabetes Brother      Social History     Socioeconomic History    Marital status:      Spouse name: Lianna    Number of children: 4    Years of education: 16    Highest education level: Not on file   Occupational History    Occupation: [employer]ER     Employer: [employer]    Tobacco Use    Smoking status: Former     Packs/day: 0.20     Years: 40.00     Pack years: 8.00     Types: Cigarettes     Start date:      Quit date: 2008     Years since quitting: 15.4    Smokeless tobacco: Never   Vaping Use    Vaping status: Never Used   Substance and Sexual Activity    Alcohol use: Not Currently     Alcohol/week: 35.0 standard drinks of alcohol    Drug use: Not Currently    Sexual activity: Not on file   Other Topics Concern    Parent/sibling w/ CABG, MI or angioplasty before 65F 55M? Not Asked   Social History Narrative    Not on file     Social Determinants of Health     Financial Resource Strain: Not on file   Food Insecurity: Not on file   Transportation Needs: Not on file   Physical Activity: Not on file   Stress: Not on file   Social Connections: Not on file   Intimate Partner Violence: Not on file   Housing Stability: Not on file           Allergies:   No known drug allergy    Today's clinic visit entailed:  Review of the result(s) of each unique test - echocardiogram, BMP  Ordering of each unique test  Prescription drug management  I spent a total of 50 minutes on the day of the visit.   Time spent by me doing chart review, history and exam, documentation and further activities per the note  Provider  Link to [facility] Help Grid     The level of medical decision making during this visit was of moderate complexity.      Thank you for allowing me to participate in the care of  your patient.      Sincerely,     KAMILLA Espino [facility]  cc:   Mario Ponce NP  [id number] [id number]

## 2023-05-26 NOTE — PATIENT INSTRUCTIONS
Today, we discussed the following:  Medication changes:   INCREASE the furosemide to 40 mg (1 tablet) once daily in the morning.   START potassium one tablet daily.  Follow up:   With me on , with a blood draw (non-fasting) prior. Bring the medications you are TAKING in with you to that visit.     Please, remember to continue the followin.  Weigh yourself daily. Call if your weight is up > than 2 pounds in one day, or 5 pounds in one week; if you feel more short of breath or have worsening swelling in your legs or abdomen.  2.  Eat a low sodium diet (less than 2,000mg or 2g daily). If you eat less salt, you will retain less fluid.   3.  Avoid alcohol, as this can worsen heart failure.   4.  Avoid NSAIDs as able (For example, Ibuprofen / aleve / advil / naprosen / diclofenac).    Thank you for your visit with the [facility] today.   [facility] stands for [facility]. The [facility] clinic will teach and help you to manage your heart failure and keep you out of the hospital.    Call [facility] nurse for any questions or concerns Mon-Fri 8am-4pm  [phone]: Nurse number   [phone]: After Hours Phone Number    Component      Latest Ref Rng 2023  7:29 AM 2023  9:10 AM   Sodium      136 - 145 mmol/L 140  139    Potassium      3.4 - 5.3 mmol/L 4.1  4.2    Chloride      98 - 107 mmol/L 103  104    Carbon Dioxide (CO2)      22 - 29 mmol/L 27  29    Anion Gap      7 - 15 mmol/L 10  6 (L)    Urea Nitrogen      8.0 - 23.0 mg/dL 10.4  13.9    Creatinine      0.67 - 1.17 mg/dL 1.16  1.27 (H)    Calcium      8.8 - 10.2 mg/dL 9.5  9.3    Glucose      70 - 99 mg/dL 209 (H)  131 (H)    GFR Estimate      >60 mL/min/1.73m2 62  56 (L)    N-Terminal Pro Bnp      0 - 1,800 pg/mL  3,829 (H)       Legend:  (H) High  (L) Low

## 2023-05-26 NOTE — PROGRESS NOTES
Cardiology Clinic Progress Note - [facility] (Heart Failure Specialty)    Tc Garcia MRN# [id number]   YOB: 1940 Age: 83 year old   Primary cardiologist: Dr. Julien         Assessment and Plan:     In summary, Tc Garcia presents to the [facility] clinic for follow up after a recent hospitalization for DKA. He was re-started on Lasix during his stay for his chronic HFpEF / severe PHTN, maintained on vasodilator therapy with Irbesartan, as well as Coreg for BP control. He has also been re-started on Eliquis, which had previously been held after he sustained a nontraumatic intracerebral hemorrhage earlier this year, suspected related to labile blood pressures.      Today, he appears volume up at 166 lbs. BP fairly well controlled. Not taking furosemide as prescribed. Reviewed cardiac meds in detail today.     Plan:  - Take furosemide 40 mg daily.   - Start potassium 10 meq daily.   - Referral for watchman implant (with Dr. Guillaume or Dr. Beck) can be considered in future discussion.  Follow-up:  - With me in 2 weeks. Instructed to bring meds in to clinic with him.    - As scheduled with Dr. Julien in July.        History of Presenting Illness:      Tc Garcia is a pleasant 83 year old patient who presents today for a [facility] clinic follow up visit.     The patient has a complex history of the following -   # Chronic HFpEF, with multiple admissions in 2020. Enrolled in [facility] with goal wt 155-163 lbs.  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 Has declined pulmonary rehab.   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 Anticoagulated with Eliquis.  # Poorly-controlled DMII  # HTN  # HLP  # CKD with  variable renal function response to diuretics, follows with [facility].   # Anemia of chronic disease, requiring intermittent Fe infusions, followed by nephrology  # Obesity  # Hx of medication confusion and non-compliance, self-adjusts diuretics frequently.  # Social - Lives with wife, Radha. Sedentary. High sodium diet, medication noncompliance, some concern over cognitive function and medical literacy.    Please see my Clinic note dated 11/12/2021 for Tc's detailed history.  In brief, Tc had multiple admissions in the summer 2020 for acute HFpEF and a reaccumulating pleural effusion.  Since then, he has been followed closely in the [facility] clinic, and has remained out of the hospital until March 2021 when he was admitted for dehydration after he had 9 teeth extracted.  His losartan, Lasix, and spironolactone were all held, and then slowly readministered.  However after that, his spironolactone was discontinued completely due to hyperkalemia.    Of note, his creatinine has been quite variable, ranging from 1.3 - 1.9 over the past year.  He is followed by nephrology.    Unfortunately, he has again had a recently tumultuous course.    In January 2023, the patient was admitted to [facility] several times. He presented initially on 1/15/23 with weakness and altered mental status and was found to have nontraumatic intraventricular intracerebral hemorrhage in the setting of hypertension and anticoagulation with apixaban. He received Kcentra for anticoagulation reversal. He re-presented on 1/29/23 with very labile blood sugars going from the 600s to under 50 after treatment. He was also hypotensive initially in the setting of hypovolemia and had some issues with labile blood pressures during his stay as well. Several adjustments were made to medications, including discontinuation of apixaban. Irbesratan was decreased from 150 mg to 75 mg once daily. He was also previously on lasix 40  "mg once daily as well as amlodipine at 10 mg once daily and these were both stopped.       He was later evaluated again in the Emergency Department on 2/11/2023 with \"seizure like activity\" - per chart review noted to have some stiffening but no extremity shaking and decreased level of consciousness with blood glucose at the time noted to be 33. Head CT showed resolving hemorrhage.     He was then admitted in April with diabetic ketoacidosis and ABBIE. He had some issues with worsening dyspnea while there. TTE during his stay showed stable preserved EF with moderately dilated RV, moderately severe TR and moderate-severe PHTN. He was placed back on Lasix at 20 mg daily. Additionally, neuro saw him and placed him back on Eliquis due to resolved hemorrhage. Note states they felt his prior CVA was related to suboptimally treated HTN.   Of note, it was strongly recommended that he discharged to an LTAC, but he stated he would rather die at home then go to an LTAC for stabilization.  It does not appear that hospice care has been discussed with him.  He is DNR/DNI CODE STATUS.    Seen by neurology in clinic yesterday, note incomplete.     Today, Tc returns to clinic stating he's doing okay. His weight is up significantly at 166 lbs on our clinic scale (140 lbs at hospital discharge, states 155 at home). Significant peripheral edema and worsening exertional dyspnea. Significant med compliance concerns. States daughter helping him but I think she's only helping with the insulin adjustments. Not taking his amlodipine, which had previously been re-started by my colleague Rodrigo. Taking furosemide 40 mg 2-3 x per week at night. /62, HR 50's. creat stable at 1.27. proBNP ~4K. Glucose controlled at 131.  States BP at home has been under good control.          Review of Systems:     12-pt ROS is negative except for as noted in the HPI.          Physical Exam:     Vitals: BP (!) 146/62 (BP Location: Right arm, Patient Position: " "Sitting, Cuff Size: Adult Regular)   Pulse 54   Ht 1.753 m (5' 9\")   Wt 75.5 kg (166 lb 8 oz)   SpO2 100%   BMI 24.59 kg/m    Wt Readings from Last 10 Encounters:   05/26/23 75.5 kg (166 lb 8 oz)   05/02/23 63.6 kg (140 lb 3.2 oz)   03/07/23 69.3 kg (152 lb 12.8 oz)   02/17/23 69.9 kg (154 lb)   02/16/23 69.9 kg (154 lb)   02/13/23 69.9 kg (154 lb 1.6 oz)   02/11/23 72.1 kg (159 lb)   02/10/23 69.4 kg (153 lb)   02/09/23 69.5 kg (153 lb 3.2 oz)   02/06/23 68.9 kg (151 lb 12.8 oz)       Constitutional:  Patient is pleasant, alert, cooperative, and in NAD.  HEENT:  NCAT. PERRLA. EOM's intact.   Neck:  CVP appears normal. No carotid bruits.   Pulmonary: Normal respiratory effort. Chronic L sided crackles (fibrosis).   Cardiac: Irregularly irregular, variable S1, no S3/S4, no murmur or rub.   Abdomen:  Non-tender abdomen, no hepatosplenomegaly appreciated.   Vascular: Pulses in the upper and lower extremities are 2+ and equal bilaterally.  Extremities: 2+ pitting edema pretibial to knees bilaterally.  Skin:  No rashes or lesions appreciated.   Neurological:  No gross motor or sensory deficits.   Psych: Appropriate affect.        Data:     Labs reviewed:  Recent Labs   Lab Test 05/26/23  0910 01/17/23  0436 01/15/23  1321 06/08/22  0853 03/17/21  0752 02/09/21  1306 09/02/20  1044 08/10/20  1027 06/16/20  1313 05/13/20  0553 05/12/20  0541 05/11/20  0542   LDL  --  52  --   --   --   --   --   --   --  43  --   --    HDL  --  50  --   --   --   --   --   --   --  60  --   --    NHDL  --  62  --   --   --   --   --   --   --  56  --   --    CHOL  --  112  --   --   --   --   --   --   --  116  --   --    TRIG  --  51  --   --   --   --   --   --   --  65  --   --    TSH  --   --  3.11  --  0.94  --   --  4.75*  --   --    < >  --    NTBNP 3,829*  --   --  2,832*  --  3,149*   < >  --    < >  --   --   --    IRON  --   --   --   --   --   --   --   --   --   --   --  16*   FEB  --   --   --   --   --   --   --   --   " --   --   --  161*   IRONSAT  --   --   --   --   --   --   --   --   --   --   --  10*   ERNESTO  --   --   --   --   --   --   --   --   --   --   --  142    < > = values in this interval not displayed.       Lab Results   Component Value Date    WBC 7.9 05/01/2023    WBC 10.4 06/15/2021    RBC 4.52 05/01/2023    RBC 4.08 (A) 06/15/2021    HGB 12.4 (L) 05/01/2023    HGB 11.7 (A) 06/15/2021    HCT 38.4 (L) 05/01/2023    HCT 36.3 (A) 06/15/2021    MCV 85 05/01/2023    MCV 89 06/15/2021    MCH 27.4 05/01/2023    MCH 28.7 06/15/2021    MCHC 32.3 05/01/2023    MCHC 32.2 06/15/2021    RDW 14.8 05/01/2023    RDW 14.2 06/15/2021     05/01/2023     06/15/2021       Lab Results   Component Value Date     05/26/2023     07/12/2021    POTASSIUM 4.2 05/26/2023    POTASSIUM 4.2 01/17/2023    POTASSIUM 4.0 07/12/2021    CHLORIDE 104 05/26/2023    CHLORIDE 105 01/17/2023    CHLORIDE 102 11/04/2021    CHLORIDE 100 07/12/2021    CO2 29 05/26/2023    CO2 25 01/17/2023    CO2 26 07/12/2021    ANIONGAP 6 (L) 05/26/2023    ANIONGAP 12 01/17/2023    ANIONGAP 4 03/18/2021     (H) 05/26/2023     (H) 05/02/2023     (H) 01/17/2023     (A) 07/12/2021    BUN 13.9 05/26/2023    BUN 36 (H) 01/17/2023    BUN 18 07/12/2021    BUN 10 07/12/2021    CR 1.27 (H) 05/26/2023    CR 1.73 (A) 07/12/2021    GFRESTIMATED 56 (L) 05/26/2023    GFRESTIMATED 40 (L) 06/21/2021    GFRESTBLACK 46 (L) 06/21/2021    LLOYD 9.3 05/26/2023    LLOYD 9.3 07/12/2021      Lab Results   Component Value Date    AST 16 04/20/2023    AST 12 03/22/2021    ALT 6 (L) 04/20/2023    ALT 5 03/22/2021       Lab Results   Component Value Date    A1C 8.0 (H) 04/20/2023    A1C 7.7 (H) 11/20/2020       Lab Results   Component Value Date    INR 1.13 01/15/2023    INR 1.50 (H) 11/21/2020    INR 1.18 (H) 07/17/2020           Problem List:     Patient Active Problem List   Diagnosis     DM type 2, Hgb A1C 8.2 on 4/25/20     Mixed hyperlipidemia      Essential hypertension     Pain in limb     Plantar fascial fibromatosis     HYPERLIPIDEMIA LDL GOAL <100     Hemothorax on left     Recurrent Left Pleural effusion -- S/P Pleurex Cath 5/12/20     ABBIE (acute kidney injury) (H)     Acute respiratory failure with hypoxia (H)     Pulmonary hypertension (H)     CRF (chronic renal failure), stage 3      Anemia of chronic disease     Atrial fibrillation/flutter -- on Eliquis and Amiodarone     DKA (diabetic ketoacidoses)     Anemia in CKD (chronic kidney disease)     Dyspnea     SOB (shortness of breath)     Non-recurrent bilateral inguinal hernia without obstruction or gangrene     Acute cholecystitis     Abdominal pain, generalized     Non-intractable vomiting with nausea, unspecified vomiting type     Alcohol dependence (H)     CHF (congestive heart failure) (H)     Syncope and collapse     Weakness     Postoperative state     Acute kidney injury (H)     Chronic diastolic heart failure (H)     Nontraumatic intraventricular intracerebral hemorrhage (H)     Anticoagulated     Right-sided nontraumatic intraventricular intracerebral hemorrhage (H)     Dehydration     Hypoglycemia     Hypoxia     DNR (do not resuscitate)     Hypotension, unspecified hypotension type     Diabetic nephropathy associated with type 2 diabetes mellitus (H)     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, or unspecified(403.00)     Nephrotic range proteinuria     Secondary hyperparathyroidism of renal origin (H)     Stage 3b chronic kidney disease (H)     Vitamin D deficiency     Diabetic ketoacidosis without coma associated with type 2 diabetes mellitus (H)           Medications:     Current Outpatient Medications   Medication Sig Dispense Refill     acetaminophen (TYLENOL) 325 MG tablet Take 2 tablets (650 mg) by mouth every 6 hours as needed for mild pain or fever  0     amLODIPine (NORVASC) 5 MG tablet Take 1 tablet (5 mg) by mouth daily  (Patient taking differently: Take 5 mg by mouth every morning)  0     apixaban ANTICOAGULANT (ELIQUIS) 5 MG tablet Take 1 tablet (5 mg) by mouth 2 times daily 60 tablet 0     carvedilol (COREG) 12.5 MG tablet Take 1 tablet (12.5 mg) by mouth 2 times daily (with meals) 180 tablet 3     Continuous Blood Gluc  (DEXCOM G6 ) JOSE Dexcom G6    USE EACH WITH 10 DAYS SENSORS       furosemide (LASIX) 20 MG tablet Take 1 tablet (20 mg) by mouth daily (Patient taking differently: Take 20 mg by mouth daily Has 40 mg tab. States he takes 1 tab 2-3 times a week. (As of 5/26/23)) 30 tablet 0     glucagon 1 MG kit 1 mg as needed for low blood sugar       HUMALOG 100 UNIT/ML injection For refilling insulin pump       INSULIN PUMP - OUTPATIENT Medtronic device with no CGM and site change every 3 days   Sensitivity factor (midnight to midnight): 55  Bolus (units:gram): 3038-2030 = 1:9, 1178-8204 = 1:7, 0060-8225 = 1:7, 7628-8365 = 1:9, 3064-0618 = 1:13  Basal (units/hour): 8664-1642 = 0.45, 7899-0816 = 0.5, 5996-9425 = 0.625, 9838-6010 = 0.6, 4009-8671 = 0.45       irbesartan (AVAPRO) 150 MG tablet Take 1 tablet (150 mg) by mouth At Bedtime 90 tablet 3     finasteride (PROSCAR) 5 MG tablet Take 1 tablet (5 mg) by mouth daily (Patient not taking: Reported on 5/26/2023)  0     tamsulosin (FLOMAX) 0.4 MG capsule Take 1 capsule (0.4 mg) by mouth daily (Patient not taking: Reported on 5/26/2023) 30 capsule 0           Past Medical History:     Past Medical History:   Diagnosis Date     Anemia      Anemia of chronic disease 5/14/2020     Back pain      CKD (chronic kidney disease) stage 3, GFR 30-59 ml/min (H)      CRF (chronic renal failure), stage 3  5/14/2020     Fall 11/2019    suffered multiple left rib fractures, C3 and T2 fractures     Mixed hyperlipidemia 7/7/2004     Paroxysmal atrial fibrillation (H)      Personal history of colonic polyps 1972    gets colonoscopy every five years, due in 2006     Pleural  effusion on left 2019    after multiple rib fractures     Pulmonary hypertension (H) 5/10/2020    Added automatically from request for surgery [id number]     Recurrent Left Pleural effusion -- S/P Pleurex Cath 2019     Rosacea      Type II or unspecified type diabetes mellitus without mention of complication, not stated as uncontrolled      Unspecified essential hypertension      Past Surgical History:   Procedure Laterality Date     ANESTHESIA CARDIOVERSION N/A 2/3/2020    Procedure: ANESTHESIA, FOR CARDIOVERSION;  Surgeon: GENERIC ANESTHESIA PROVIDER;  Location:  OR     CV RIGHT HEART CATH MEASUREMENTS RECORDED N/A 2020    Procedure: Right Heart Cath;  Surgeon: Senthil Silva MD;  Location:  HEART CARDIAC CATH LAB     HC REMOVE TONSILS/ADENOIDS,<13 Y/O      T & A <11y.o.     IR CHEST TUBE DRAIN TUNNELED LEFT  2020     IR CHEST TUBE PLACEMENT NON-TUNNELLED LEFT  2020     IR CHEST TUBE REMOVAL NON TUNNELED LEFT  2020     IR CHEST TUBE REMOVAL TUNNELED LEFT  2020     LAPAROSCOPIC CHOLECYSTECTOMY N/A 2020    Procedure: CHOLECYSTECTOMY, LAPAROSCOPIC;  Surgeon: Annette Lambert MD;  Location:  OR     LAPAROSCOPIC HERNIORRHAPHY INGUINAL BILATERAL Bilateral 10/27/2020    Procedure: LAPAROSCOPIC BILATERAL INGUINAL HERNIA REPAIR WITH MESH;  Surgeon: Brian Garsia MD;  Location:  OR     Family History   Problem Relation Age of Onset     Family History Negative Mother          age 71     Family History Negative Father          age 70     Diabetes Brother         alive age 77     Diabetes Brother         alive age 76     Family History Negative Brother              Family History Negative Brother              Diabetes Sister         alive age74     Family History Negative Sister          age 86     Heart Disease Sister              Family History Negative Sister              Family History  Negative Sister              Diabetes Sister      Diabetes Sister      Diabetes Brother      Diabetes Brother      Social History     Socioeconomic History     Marital status:      Spouse name: Lianna     Number of children: 4     Years of education: 16     Highest education level: Not on file   Occupational History     Occupation: [employer]     Employer: [employer]    Tobacco Use     Smoking status: Former     Packs/day: 0.20     Years: 40.00     Pack years: 8.00     Types: Cigarettes     Start date:      Quit date: 2008     Years since quitting: 15.4     Smokeless tobacco: Never   Vaping Use     Vaping status: Never Used   Substance and Sexual Activity     Alcohol use: Not Currently     Alcohol/week: 35.0 standard drinks of alcohol     Drug use: Not Currently     Sexual activity: Not on file   Other Topics Concern     Parent/sibling w/ CABG, MI or angioplasty before 65F 55M? Not Asked   Social History Narrative     Not on file     Social Determinants of Health     Financial Resource Strain: Not on file   Food Insecurity: Not on file   Transportation Needs: Not on file   Physical Activity: Not on file   Stress: Not on file   Social Connections: Not on file   Intimate Partner Violence: Not on file   Housing Stability: Not on file           Allergies:   No known drug allergy    Today's clinic visit entailed:  Review of the result(s) of each unique test - echocardiogram, BMP  Ordering of each unique test  Prescription drug management  I spent a total of 50 minutes on the day of the visit.   Time spent by me doing chart review, history and exam, documentation and further activities per the note  Provider  Link to MDM Help Grid     The level of medical decision making during this visit was of moderate complexity.

## 2023-05-30 ENCOUNTER — TELEPHONE (OUTPATIENT)
Dept: CARDIOLOGY | Facility: CLINIC | Age: 83
End: 2023-05-30
Payer: COMMERCIAL

## 2023-05-30 NOTE — TELEPHONE ENCOUNTER
[facility]    Per OV note on 5/26/23 Ame Mejía PA-C writes:  Plan:  - Take furosemide 40 mg daily.   - Start potassium 10 meq daily.   - Referral for watchman implant (with Dr. Guillaume or Dr. Beck) can be considered in future discussion.  Follow-up:  - With me in 2 weeks. Instructed to bring meds in to clinic with him.    - As scheduled with Dr. Julien in July.    [facility] RN received staff message to follow up on this today 5/30/23. Called Tc and he tells me he is leaving now for another appointment, he requests I call back in an hour.    Emily Huston, RN, BSN  [facility] [place], [place]  [facility] Clinic Care Coordinator  May 30, 2023 2:04 PM    Future Appointments   Date Time Provider Department Center   6/7/2023  1:00 PM [facility] [place] [facility]   6/7/2023  1:50 PM [facility]C [facility]   7/5/2023  9:15 AM [facility]

## 2023-05-30 NOTE — TELEPHONE ENCOUNTER
[facility]    [address]    [place] [address]    Phone:  [phone]    Fax:  [phone]       Thank You for choosing us for your health care visit. We are glad to serve you and happy to provide you with this summary of your visit. Please help us to ensure we have accurate records. If you find anything that needs to be changed, please let our staff know as soon as possible.          Your Demographic Information     Patient Name Sex     Victoriano Nicholas Male 1966       Ethnic Group Patient Race    Not of  or  Origin White      Your Visit Details     Date & Time Provider Department    2017 4:55 PM Thomas Vogel, BETZAIDA [facility]      Conditions Discussed Today or Order-Related Diagnoses        Comments    Myopia of both eyes    -  Primary       Your Vitals Were     Smoking Status                   Never Smoker           Medications Prescribed or Re-Ordered Today     None      Allergies     No Known Allergies      Problem List as of 2017     Presbyopia OU    Astigmatism of right eye    Myopia of both eyes              Patient Instructions    Contact Lens Fitting Policy    ? The fee for the Contact Lens Fitting is due at the time of service.  Follow up contact lens visits, up to 90 days after the initial fitting, are included without additional cost.      ? You will receive a prescription for a one year supply of contact lenses after you have had a follow up visit with your provider and it is determined that the trial lenses fit appropriately.    ? Follow up care is the responsibility of the patient.  Failure to schedule or keep appointments to check the fit of the contact lenses within the initial 90 day fitting period will require an additional fitting and incur additional charges. In some cases, a complete eye exam may be required.    ? When coming back for contact lens follow up care, please make sure to  [facility]    Called Tc back with no answer. No opportunity to leave .    Plan to call patient for check in 5/31/23.    Emily Huston, RN, BSN  [facility] [place], [place]  [facility]. Clinic Care Coordinator  May 30, 2023 3:28 PM      Future Appointments   Date Time Provider Department Center   6/7/2023  1:00 PM [facility]   6/7/2023  1:50 PM Ame Mejía PA-C [facility]   7/5/2023  9:15 AM [facility]        come in wearing your new trial contact lenses for at least 2 hours. If something happens to one or both of your trial lenses or if you are unable to wear them due to comfort, please contact our office prior to your check up appointment     Your contact lens prescription is valid for one (1) year from the date it was written. FDA regulations require contact lens fittings be done on an annual basis.

## 2023-05-31 NOTE — TELEPHONE ENCOUNTER
[facility]    Per OV note on 5/26/23 Ame Mejía PA-C writes:  Plan:  - Take furosemide 40 mg daily.   - Start potassium 10 meq daily.   - Referral for watchman implant (with Dr. Guillaume or Dr. Beck) can be considered in future discussion.  Follow-up:  - With me in 2 weeks. Instructed to bring meds in to clinic with him.    - As scheduled with Dr. Julien in July.    [facility] RN received staff message to check in with pt 5/30/23 for update. Pt unavailable 5/30/23 so follow up call placed today.    Notes: Tc tells me he is feeling well. He confirms he is taking lasix 40mg/ daily and KCL 10mEq daily as prescribed. He states his SOB is much improving. Swelling is getting better. Pt confirms follow up with labs and Ame Mejía PA-C on 6/7/23.    Recent Weights:  5/26/23 166 lbs in clinic, states 155 lbs at home  5/27/23 158 lbs  5/28/23 159 lbs  5/29/23 158 lbs  5/30/23 156 lbs  5/31/23 154 lbs    Recent BP's taken at 0900 after meds:  5/27/23  139/65  5/28/23  143/67  5/29/23  150/69 (pt reports he was a little upset when taking this reading)  5/30/23  128/66  5/31/23  139/66     Emily Huston, RN, BSN  [facility] [place], [place]  [facility] Clinic Care Coordinator  May 31, 2023 11:57 AM    Future Appointments   Date Time Provider Department Center   6/7/2023  1:00 PM [facility] [place] [facility]   6/7/2023  1:50 PM [facility]   7/5/2023  9:15 AM [facility]

## 2023-06-07 NOTE — LETTER
6/7/2023    Jessika Ratliff Adalid  [address]  [place] [address]    RE: Tc Garcia       Dear Colleague,     I had the pleasure of seeing Tc Garcia in the [facility].    Cardiology Clinic Progress Note - [facility] (Heart Failure Specialty)    Tc Garcia MRN# [id number]   YOB: 1940 Age: 83 year old   Primary cardiologist: Dr. Julien         Assessment and Plan:     In summary, cT Garcia presents to the [facility] clinic for follow up after a recent hospitalization in April for DKA. He was re-started on Lasix during his stay for his chronic HFpEF / severe PHTN, maintained on vasodilator therapy with Irbesartan, as well as Coreg for BP control. He has also been re-started on Eliquis, which had previously been held after he sustained a nontraumatic intracerebral hemorrhage earlier this year, suspected related to labile blood pressures.      When I saw him in clinic 2 weeks ago, he was significantly volume up at 166 pounds, not taking his furosemide as prescribed.  I asked him to take it every day.  Today, he's down nearly 20 lbs and appears well-compensated. He is compliant with his meds, which he brought in to clinic today. His blood pressures have been under excellent control at home.     Plan:  - No changes today. I have asked him to continue compliance with meds and vitals, and to contact me with any rapid weight gain going forward, OR if his weight were to fall < 145 lbs -- in that case, I would back off on furosemide to 20 mg daily.  - We discussed enrolling him in HRS today. He wants to consider this further - for now, he'll keep recording his vitals as he has been. I'd like to make sure he can be compliant before we would consider a MEMs for him.  - Referral for watchman implant (with Dr. Guillaume or Dr. Beck) can be considered in future discussion.  Follow-up:   - As scheduled with Dr. Julien in July.        History of Presenting Illness:      Tc Garcia is  a pleasant 83 year old patient who presents today for a [facility] clinic follow up visit.     The patient has a complex history of the following -   # Chronic HFpEF, with multiple admissions in 2020. Enrolled in MHT with goal wt 155-163 lbs.  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 Has declined pulmonary rehab.   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 Anticoagulated with Eliquis.  # Poorly-controlled DMII  # HTN  # HLP  # CKD with variable renal function response to diuretics, follows with [facility].   # Anemia of chronic disease, requiring intermittent Fe infusions, followed by nephrology  # Obesity  # Hx of medication confusion and non-compliance, self-adjusts diuretics frequently.  # Social - Lives with wife, Radha. Sedentary. High sodium diet, medication noncompliance, some concern over cognitive function and medical literacy.  # DNR/DNI CODE STATUS.    Please see my Clinic note dated 11/12/2021 for Tc's detailed history.  In brief, Tc had multiple admissions in the summer 2020 for acute HFpEF and a reaccumulating pleural effusion.  Since then, he has been followed closely in the [facility] clinic, and has remained out of the hospital until March 2021 when he was admitted for dehydration after he had 9 teeth extracted.  His losartan, Lasix, and spironolactone were all held, and then slowly readministered.  However after that, his spironolactone was discontinued completely due to hyperkalemia.    Of note, his creatinine has been quite variable, ranging from 1.3 - 1.9 over the past year.  He is followed by nephrology.    Unfortunately, he has again had a recently tumultuous course.    In  "January 2023, the patient was admitted to [facility] several times. He presented initially on 1/15/23 with weakness and altered mental status and was found to have nontraumatic intraventricular intracerebral hemorrhage in the setting of hypertension and anticoagulation with apixaban. He received Kcentra for anticoagulation reversal. He re-presented on 1/29/23 with very labile blood sugars going from the 600s to under 50 after treatment. He was also hypotensive initially in the setting of hypovolemia and had some issues with labile blood pressures during his stay as well. Several adjustments were made to medications, including discontinuation of apixaban. Irbesratan was decreased from 150 mg to 75 mg once daily. He was also previously on lasix 40 mg once daily as well as amlodipine at 10 mg once daily and these were both stopped.       He was later evaluated again in the Emergency Department on 2/11/2023 with \"seizure like activity\" - per chart review noted to have some stiffening but no extremity shaking and decreased level of consciousness with blood glucose at the time noted to be 33. Head CT showed resolving hemorrhage.     He was then admitted in April with diabetic ketoacidosis and ABBIE. He had some issues with worsening dyspnea while there. TTE during his stay showed stable preserved EF with moderately dilated RV, moderately severe TR and moderate-severe PHTN. He was placed back on Lasix at 20 mg daily. Additionally, neuro saw him and placed him back on Eliquis due to resolved hemorrhage. Note states they felt his prior CVA was related to suboptimally treated HTN.  There were of course medication compliance concerns, and per notes his daughter stated she would help with this going forward. It was strongly recommended that he discharged to an LTAC, but he stated he would rather die at home than go to an LTAC for stabilization.     When I last saw Tc on May 26, his weight was up " "significantly at 166 lbs on our clinic scale, with significant peripheral edema and progressive exertional dyspnea.  He was only taking his furosemide 40 mg 2-3 times per week.  I increased it to 40 mg daily, and added potassium 10 meq daily.    Today, Tc returns to clinic stating he's feeling better. His weight is down nearly 20 lbs at 148 lbs in clinic, and has levelled off at 146 lbs at home. I asked him to bring all his meds with him today, which he did, and he seems to be taking everything appropriately. His creatinine is pretty stable at 1.4 with a BUN of 12, potassium of 4.3. Blood glucose elevated at 200. Following with endocrinology Dr. Asif.  His BP in clinic is elevated, but at home it's been well-controlled. He's been doing a great job recently at recording his vitals. Thinks overall he's more winded than he was a year ago, but recently stable. Gets out of breath with walking uphill, but can walk the dogs on a flat surface with no problem. His peripheral edema has resolved. No orthopnea, chest pain, palpitations, near-syncope.          Review of Systems:     12-pt ROS is negative except for as noted in the HPI.          Physical Exam:     Vitals: BP (!) 152/82 (BP Location: Right arm, Patient Position: Sitting, Cuff Size: Adult Regular)   Pulse 56   Ht 1.753 m (5' 9\")   Wt 67.1 kg (148 lb)   SpO2 97%   BMI 21.86 kg/m    Wt Readings from Last 10 Encounters:   06/07/23 67.1 kg (148 lb)   05/26/23 75.5 kg (166 lb 8 oz)   05/02/23 63.6 kg (140 lb 3.2 oz)   03/07/23 69.3 kg (152 lb 12.8 oz)   02/17/23 69.9 kg (154 lb)   02/16/23 69.9 kg (154 lb)   02/13/23 69.9 kg (154 lb 1.6 oz)   02/11/23 72.1 kg (159 lb)   02/10/23 69.4 kg (153 lb)   02/09/23 69.5 kg (153 lb 3.2 oz)       Constitutional:  Patient is pleasant, alert, cooperative, and in NAD.  HEENT:  NCAT. PERRLA. EOM's intact.   Neck:  CVP appears normal. No carotid bruits.   Pulmonary: Normal respiratory effort. Blunted lung sounds at L " base.  Cardiac: Irregularly irregular, variable S1, no S3/S4, no murmur or rub.   Abdomen:  Non-tender abdomen, no hepatosplenomegaly appreciated.   Vascular: Pulses in the upper and lower extremities are 2+ and equal bilaterally.  Extremities: No edema.  Skin:  No rashes or lesions appreciated.   Neurological:  No gross motor or sensory deficits.   Psych: Appropriate affect.        Data:     Labs reviewed:  Recent Labs   Lab Test 05/26/23  0910 01/17/23  0436 01/15/23  1321 06/08/22  0853 03/17/21  0752 02/09/21  1306 09/02/20  1044 08/10/20  1027 06/16/20  1313 05/13/20  0553 05/12/20  0541 05/11/20  0542   LDL  --  52  --   --   --   --   --   --   --  43  --   --    HDL  --  50  --   --   --   --   --   --   --  60  --   --    NHDL  --  62  --   --   --   --   --   --   --  56  --   --    CHOL  --  112  --   --   --   --   --   --   --  116  --   --    TRIG  --  51  --   --   --   --   --   --   --  65  --   --    TSH  --   --  3.11  --  0.94  --   --  4.75*  --   --    < >  --    NTBNP 3,829*  --   --  2,832*  --  3,149*   < >  --    < >  --   --   --    IRON  --   --   --   --   --   --   --   --   --   --   --  16*   FEB  --   --   --   --   --   --   --   --   --   --   --  161*   IRONSAT  --   --   --   --   --   --   --   --   --   --   --  10*   ERNESTO  --   --   --   --   --   --   --   --   --   --   --  142    < > = values in this interval not displayed.       Lab Results   Component Value Date    WBC 7.9 05/01/2023    WBC 10.4 06/15/2021    RBC 4.52 05/01/2023    RBC 4.08 (A) 06/15/2021    HGB 12.4 (L) 05/01/2023    HGB 11.7 (A) 06/15/2021    HCT 38.4 (L) 05/01/2023    HCT 36.3 (A) 06/15/2021    MCV 85 05/01/2023    MCV 89 06/15/2021    MCH 27.4 05/01/2023    MCH 28.7 06/15/2021    MCHC 32.3 05/01/2023    MCHC 32.2 06/15/2021    RDW 14.8 05/01/2023    RDW 14.2 06/15/2021     05/01/2023     06/15/2021       Lab Results   Component Value Date     06/07/2023     07/12/2021     POTASSIUM 4.3 06/07/2023    POTASSIUM 4.2 01/17/2023    POTASSIUM 4.0 07/12/2021    CHLORIDE 101 06/07/2023    CHLORIDE 105 01/17/2023    CHLORIDE 102 11/04/2021    CHLORIDE 100 07/12/2021    CO2 31 (H) 06/07/2023    CO2 25 01/17/2023    CO2 26 07/12/2021    ANIONGAP 9 06/07/2023    ANIONGAP 12 01/17/2023    ANIONGAP 4 03/18/2021     (H) 06/07/2023     (H) 05/02/2023     (H) 01/17/2023     (A) 07/12/2021    BUN 12.1 06/07/2023    BUN 36 (H) 01/17/2023    BUN 18 07/12/2021    BUN 10 07/12/2021    CR 1.40 (H) 06/07/2023    CR 1.73 (A) 07/12/2021    GFRESTIMATED 50 (L) 06/07/2023    GFRESTIMATED 40 (L) 06/21/2021    GFRESTBLACK 46 (L) 06/21/2021    LLOYD 9.1 06/07/2023    LLOYD 9.3 07/12/2021      Lab Results   Component Value Date    AST 16 04/20/2023    AST 12 03/22/2021    ALT 6 (L) 04/20/2023    ALT 5 03/22/2021       Lab Results   Component Value Date    A1C 8.0 (H) 04/20/2023    A1C 7.7 (H) 11/20/2020       Lab Results   Component Value Date    INR 1.13 01/15/2023    INR 1.50 (H) 11/21/2020    INR 1.18 (H) 07/17/2020           Problem List:     Patient Active Problem List   Diagnosis    DM type 2, Hgb A1C 8.2 on 4/25/20    Mixed hyperlipidemia    Essential hypertension    Pain in limb    Plantar fascial fibromatosis    HYPERLIPIDEMIA LDL GOAL <100    Hemothorax on left    Recurrent Left Pleural effusion -- S/P Pleurex Cath 5/12/20    ABBIE (acute kidney injury) (H)    Acute respiratory failure with hypoxia (H)    Pulmonary hypertension (H)    CRF (chronic renal failure), stage 3     Anemia of chronic disease    Atrial fibrillation/flutter -- on Eliquis and Amiodarone    DKA (diabetic ketoacidoses)    Anemia in CKD (chronic kidney disease)    Dyspnea    SOB (shortness of breath)    Non-recurrent bilateral inguinal hernia without obstruction or gangrene    Acute cholecystitis    Abdominal pain, generalized    Non-intractable vomiting with nausea, unspecified vomiting type    Alcohol dependence  (H)    CHF (congestive heart failure) (H)    Syncope and collapse    Weakness    Postoperative state    Acute kidney injury (H)    Chronic diastolic heart failure (H)    Nontraumatic intraventricular intracerebral hemorrhage (H)    Anticoagulated    Right-sided nontraumatic intraventricular intracerebral hemorrhage (H)    Dehydration    Hypoglycemia    Hypoxia    DNR (do not resuscitate)    Hypotension, unspecified hypotension type    Diabetic nephropathy associated with type 2 diabetes mellitus (H)    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, or unspecified(403.00)    Nephrotic range proteinuria    Secondary hyperparathyroidism of renal origin (H)    Stage 3b chronic kidney disease (H)    Vitamin D deficiency    Diabetic ketoacidosis without coma associated with type 2 diabetes mellitus (H)           Medications:     Current Outpatient Medications   Medication Sig Dispense Refill    acetaminophen (TYLENOL) 325 MG tablet Take 2 tablets (650 mg) by mouth every 6 hours as needed for mild pain or fever  0    apixaban ANTICOAGULANT (ELIQUIS) 5 MG tablet Take 1 tablet (5 mg) by mouth 2 times daily 60 tablet 0    carvedilol (COREG) 12.5 MG tablet Take 1 tablet (12.5 mg) by mouth 2 times daily (with meals) 180 tablet 3    Continuous Blood Gluc  (DEXCOM G6 ) JOSE Dexcom G6    USE EACH WITH 10 DAYS SENSORS      finasteride (PROSCAR) 5 MG tablet Take 1 tablet (5 mg) by mouth daily  0    furosemide (LASIX) 40 MG tablet Take 1 tablet (40 mg) by mouth daily 90 tablet 3    glucagon 1 MG kit 1 mg as needed for low blood sugar      HUMALOG 100 UNIT/ML injection For refilling insulin pump      INSULIN PUMP - OUTPATIENT Medtronic device with no CGM and site change every 3 days   Sensitivity factor (midnight to midnight): 55  Bolus (units:gram): 5374-6774 = 1:9, 3920-2155 = 1:7, 6433-5573 = 1:7, 9826-6612 = 1:9, 6414-5309 = 1:13  Basal (units/hour): 6471-0041 =  0.45, 8244-0518 = 0.5, 9690-7711 = 0.625, 8162-4899 = 0.6, 8350-2874 = 0.45      irbesartan (AVAPRO) 150 MG tablet Take 1 tablet (150 mg) by mouth At Bedtime 90 tablet 3    potassium chloride ER (K-TAB/KLOR-CON) 10 MEQ CR tablet Take 1 tablet (10 mEq) by mouth daily 90 tablet 3    tamsulosin (FLOMAX) 0.4 MG capsule Take 0.4 mg by mouth every morning             Past Medical History:     Past Medical History:   Diagnosis Date    Anemia     Anemia of chronic disease 5/14/2020    Back pain     CKD (chronic kidney disease) stage 3, GFR 30-59 ml/min (H)     CRF (chronic renal failure), stage 3  5/14/2020    Fall 11/2019    suffered multiple left rib fractures, C3 and T2 fractures    Mixed hyperlipidemia 7/7/2004    Paroxysmal atrial fibrillation (H)     Personal history of colonic polyps 1972    gets colonoscopy every five years, due in 2006    Pleural effusion on left 11/2019    after multiple rib fractures    Pulmonary hypertension (H) 5/10/2020    Added automatically from request for surgery [id number]    Recurrent Left Pleural effusion -- S/P Pleurex Cath 5/12/20 12/30/2019    Rosacea 1989    Type II or unspecified type diabetes mellitus without mention of complication, not stated as uncontrolled 1999    Unspecified essential hypertension 1994     Past Surgical History:   Procedure Laterality Date    ANESTHESIA CARDIOVERSION N/A 2/3/2020    Procedure: ANESTHESIA, FOR CARDIOVERSION;  Surgeon: GENERIC ANESTHESIA PROVIDER;  Location:  OR     RIGHT HEART CATH MEASUREMENTS RECORDED N/A 5/13/2020    Procedure: Right Heart Cath;  Surgeon: Senthil Silva MD;  Location:  HEART CARDIAC CATH LAB    HC REMOVE TONSILS/ADENOIDS,<11 Y/O      T & A <11y.o.    IR CHEST TUBE DRAIN TUNNELED LEFT  5/12/2020    IR CHEST TUBE PLACEMENT NON-TUNNELLED LEFT  7/11/2020    IR CHEST TUBE REMOVAL NON TUNNELED LEFT  7/17/2020    IR CHEST TUBE REMOVAL TUNNELED LEFT  7/11/2020    LAPAROSCOPIC CHOLECYSTECTOMY N/A 11/22/2020    Procedure:  CHOLECYSTECTOMY, LAPAROSCOPIC;  Surgeon: Annette Lambert MD;  Location:  OR    LAPAROSCOPIC HERNIORRHAPHY INGUINAL BILATERAL Bilateral 10/27/2020    Procedure: LAPAROSCOPIC BILATERAL INGUINAL HERNIA REPAIR WITH MESH;  Surgeon: Brian Garsia MD;  Location:  OR     Family History   Problem Relation Age of Onset    Family History Negative Mother          age 71    Family History Negative Father          age 70    Diabetes Brother         alive age 77    Diabetes Brother         alive age 76    Family History Negative Brother             Family History Negative Brother             Diabetes Sister         alive age74    Family History Negative Sister          age 86    Heart Disease Sister             Family History Negative Sister             Family History Negative Sister             Diabetes Sister     Diabetes Sister     Diabetes Brother     Diabetes Brother      Social History     Socioeconomic History    Marital status:      Spouse name: Lianna    Number of children: 4    Years of education: 16    Highest education level: Not on file   Occupational History    Occupation: [employer]     Employer: [employer]    Tobacco Use    Smoking status: Former     Packs/day: 0.20     Years: 40.00     Pack years: 8.00     Types: Cigarettes     Start date:      Quit date: 2008     Years since quitting: 15.4    Smokeless tobacco: Never   Vaping Use    Vaping status: Never Used   Substance and Sexual Activity    Alcohol use: Not Currently     Alcohol/week: 35.0 standard drinks of alcohol    Drug use: Not Currently    Sexual activity: Not on file   Other Topics Concern    Parent/sibling w/ CABG, MI or angioplasty before 65F 55M? Not Asked   Social History Narrative    Not on file     Social Determinants of Health     Financial Resource Strain: Not on file   Food Insecurity: Not on file   Transportation Needs: Not on file    Physical Activity: Not on file   Stress: Not on file   Social Connections: Not on file   Intimate Partner Violence: Not on file   Housing Stability: Not on file           Allergies:   No known drug allergy    Today's clinic visit entailed:  Review of the result(s) of each unique test - BMP  Ordering of each unique test  I spent a total of 30 minutes on the day of the visit.   Time spent by me doing chart review, history and exam, documentation and further activities per the note  Provider  Link to [facility] Help Grid     The level of medical decision making during this visit was of moderate complexity.          Thank you for allowing me to participate in the care of your patient.      Sincerely,     Ame Mejía PA-C     [facility]  cc:   Ame Mejía PA-C  [id number]  [place] [address]

## 2023-06-07 NOTE — PATIENT INSTRUCTIONS
Today, we discussed the following:   - Results:   The kidney function looks stable.  - Medication changes:    Keep the meds the same for now. If your weight falls below 145 lbs, call me.   - Follow up:   Return to see Dr. Julien in July as scheduled.   __________________________________________________________________________  Please, remember to continue the followin.  Weigh yourself daily. Call if your weight is up > than 2 pounds in one day, or 5 pounds in one week; if you feel more short of breath or have worsening swelling in your legs or abdomen.  2.  Eat a low sodium diet (less than 2,000mg or 2g daily). If you eat less salt, you will retain less fluid.   3.  Avoid alcohol, as this can worsen heart failure.   4.  Avoid NSAIDs as able (For example, Ibuprofen / aleve / advil / naprosen / diclofenac).    Please call [facility] nurse for any questions or concerns Mon-Fri 8am-4pm:   [phone]  For concerns after hours:   [phone] option 2   Scheduling phone number:   [phone]    Thank you for visiting with us today.   Ame Mejía PA-C  ______________________________________________________________

## 2023-06-07 NOTE — PROGRESS NOTES
[facility]    Pt had OV with Ame Mejía PA-C today 6/7/23. HRS was discussed. Pt would like call back to see about how much HRS program would cost him.    Emily Huston RN, BSN  [facility] [place] [place]  RAOULOFIFI. Clinic Care Coordinator  June 7, 2023 4:21 PM      Future Appointments   Date Time Provider Department Center   7/5/2023  9:15 AM Cony Julien DO [facility]

## 2023-06-07 NOTE — PROGRESS NOTES
Cardiology Clinic Progress Note - [facility] (Heart Failure Specialty)    Tc Garcia MRN# [id number]   YOB: 1940 Age: 83 year old   Primary cardiologist: Dr. Julien         Assessment and Plan:     In summary, Tc Garcia presents to the [facility] clinic for follow up after a recent hospitalization in April for DKA. He was re-started on Lasix during his stay for his chronic HFpEF / severe PHTN, maintained on vasodilator therapy with Irbesartan, as well as Coreg for BP control. He has also been re-started on Eliquis, which had previously been held after he sustained a nontraumatic intracerebral hemorrhage earlier this year, suspected related to labile blood pressures.      When I saw him in clinic 2 weeks ago, he was significantly volume up at 166 pounds, not taking his furosemide as prescribed.  I asked him to take it every day.  Today, he's down nearly 20 lbs and appears well-compensated. He is compliant with his meds, which he brought in to clinic today. His blood pressures have been under excellent control at home.     Plan:  - No changes today. I have asked him to continue compliance with meds and vitals, and to contact me with any rapid weight gain going forward, OR if his weight were to fall < 145 lbs -- in that case, I would back off on furosemide to 20 mg daily.  - We discussed enrolling him in HRS today. He wants to consider this further - for now, he'll keep recording his vitals as he has been. I'd like to make sure he can be compliant before we would consider a MEMs for him.  - Referral for watchman implant (with Dr. Guillaume or Dr. Beck) can be considered in future discussion.  Follow-up:   - As scheduled with Dr. Julien in July.        History of Presenting Illness:      Tc Garcia is a pleasant 83 year old patient who presents today for a [facility] clinic follow up visit.     The patient has a complex history of the following -   # Chronic HFpEF, with multiple admissions in 2020. Enrolled in  MHT with goal wt 155-163 lbs.  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 Has declined pulmonary rehab.   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 Anticoagulated with Eliquis.  # Poorly-controlled DMII  # HTN  # HLP  # CKD with variable renal function response to diuretics, follows with [facility].   # Anemia of chronic disease, requiring intermittent Fe infusions, followed by nephrology  # Obesity  # Hx of medication confusion and non-compliance, self-adjusts diuretics frequently.  # Social - Lives with wife, Radha. Sedentary. High sodium diet, medication noncompliance, some concern over cognitive function and medical literacy.  # DNR/DNI CODE STATUS.    Please see my Clinic note dated 11/12/2021 for Tc's detailed history.  In brief, cT had multiple admissions in the summer 2020 for acute HFpEF and a reaccumulating pleural effusion.  Since then, he has been followed closely in the [facility] clinic, and has remained out of the hospital until March 2021 when he was admitted for dehydration after he had 9 teeth extracted.  His losartan, Lasix, and spironolactone were all held, and then slowly readministered.  However after that, his spironolactone was discontinued completely due to hyperkalemia.    Of note, his creatinine has been quite variable, ranging from 1.3 - 1.9 over the past year.  He is followed by nephrology.    Unfortunately, he has again had a recently tumultuous course.    In January 2023, the patient was admitted to [facility] several times. He presented initially on 1/15/23 with weakness and altered mental status and was found to have nontraumatic  "intraventricular intracerebral hemorrhage in the setting of hypertension and anticoagulation with apixaban. He received Kcentra for anticoagulation reversal. He re-presented on 1/29/23 with very labile blood sugars going from the 600s to under 50 after treatment. He was also hypotensive initially in the setting of hypovolemia and had some issues with labile blood pressures during his stay as well. Several adjustments were made to medications, including discontinuation of apixaban. Irbesratan was decreased from 150 mg to 75 mg once daily. He was also previously on lasix 40 mg once daily as well as amlodipine at 10 mg once daily and these were both stopped.       He was later evaluated again in the Emergency Department on 2/11/2023 with \"seizure like activity\" - per chart review noted to have some stiffening but no extremity shaking and decreased level of consciousness with blood glucose at the time noted to be 33. Head CT showed resolving hemorrhage.     He was then admitted in April with diabetic ketoacidosis and ABBIE. He had some issues with worsening dyspnea while there. TTE during his stay showed stable preserved EF with moderately dilated RV, moderately severe TR and moderate-severe PHTN. He was placed back on Lasix at 20 mg daily. Additionally, neuro saw him and placed him back on Eliquis due to resolved hemorrhage. Note states they felt his prior CVA was related to suboptimally treated HTN.  There were of course medication compliance concerns, and per notes his daughter stated she would help with this going forward. It was strongly recommended that he discharged to an LTAC, but he stated he would rather die at home than go to an LTAC for stabilization.     When I last saw Tc on May 26, his weight was up significantly at 166 lbs on our clinic scale, with significant peripheral edema and progressive exertional dyspnea.  He was only taking his furosemide 40 mg 2-3 times per week.  I increased it to 40 mg daily, " "and added potassium 10 meq daily.    Today, Tc returns to clinic stating he's feeling better. His weight is down nearly 20 lbs at 148 lbs in clinic, and has levelled off at 146 lbs at home. I asked him to bring all his meds with him today, which he did, and he seems to be taking everything appropriately. His creatinine is pretty stable at 1.4 with a BUN of 12, potassium of 4.3. Blood glucose elevated at 200. Following with endocrinology Dr. Asif.  His BP in clinic is elevated, but at home it's been well-controlled. He's been doing a great job recently at recording his vitals. Thinks overall he's more winded than he was a year ago, but recently stable. Gets out of breath with walking uphill, but can walk the dogs on a flat surface with no problem. His peripheral edema has resolved. No orthopnea, chest pain, palpitations, near-syncope.          Review of Systems:     12-pt ROS is negative except for as noted in the HPI.          Physical Exam:     Vitals: BP (!) 152/82 (BP Location: Right arm, Patient Position: Sitting, Cuff Size: Adult Regular)   Pulse 56   Ht 1.753 m (5' 9\")   Wt 67.1 kg (148 lb)   SpO2 97%   BMI 21.86 kg/m    Wt Readings from Last 10 Encounters:   06/07/23 67.1 kg (148 lb)   05/26/23 75.5 kg (166 lb 8 oz)   05/02/23 63.6 kg (140 lb 3.2 oz)   03/07/23 69.3 kg (152 lb 12.8 oz)   02/17/23 69.9 kg (154 lb)   02/16/23 69.9 kg (154 lb)   02/13/23 69.9 kg (154 lb 1.6 oz)   02/11/23 72.1 kg (159 lb)   02/10/23 69.4 kg (153 lb)   02/09/23 69.5 kg (153 lb 3.2 oz)       Constitutional:  Patient is pleasant, alert, cooperative, and in NAD.  HEENT:  NCAT. PERRLA. EOM's intact.   Neck:  CVP appears normal. No carotid bruits.   Pulmonary: Normal respiratory effort. Blunted lung sounds at L base.  Cardiac: Irregularly irregular, variable S1, no S3/S4, no murmur or rub.   Abdomen:  Non-tender abdomen, no hepatosplenomegaly appreciated.   Vascular: Pulses in the upper and lower extremities are 2+ and equal " bilaterally.  Extremities: No edema.  Skin:  No rashes or lesions appreciated.   Neurological:  No gross motor or sensory deficits.   Psych: Appropriate affect.        Data:     Labs reviewed:  Recent Labs   Lab Test 05/26/23  0910 01/17/23  0436 01/15/23  1321 06/08/22  0853 03/17/21  0752 02/09/21  1306 09/02/20  1044 08/10/20  1027 06/16/20  1313 05/13/20  0553 05/12/20  0541 05/11/20  0542   LDL  --  52  --   --   --   --   --   --   --  43  --   --    HDL  --  50  --   --   --   --   --   --   --  60  --   --    NHDL  --  62  --   --   --   --   --   --   --  56  --   --    CHOL  --  112  --   --   --   --   --   --   --  116  --   --    TRIG  --  51  --   --   --   --   --   --   --  65  --   --    TSH  --   --  3.11  --  0.94  --   --  4.75*  --   --    < >  --    NTBNP 3,829*  --   --  2,832*  --  3,149*   < >  --    < >  --   --   --    IRON  --   --   --   --   --   --   --   --   --   --   --  16*   FEB  --   --   --   --   --   --   --   --   --   --   --  161*   IRONSAT  --   --   --   --   --   --   --   --   --   --   --  10*   ERNESTO  --   --   --   --   --   --   --   --   --   --   --  142    < > = values in this interval not displayed.       Lab Results   Component Value Date    WBC 7.9 05/01/2023    WBC 10.4 06/15/2021    RBC 4.52 05/01/2023    RBC 4.08 (A) 06/15/2021    HGB 12.4 (L) 05/01/2023    HGB 11.7 (A) 06/15/2021    HCT 38.4 (L) 05/01/2023    HCT 36.3 (A) 06/15/2021    MCV 85 05/01/2023    MCV 89 06/15/2021    MCH 27.4 05/01/2023    MCH 28.7 06/15/2021    MCHC 32.3 05/01/2023    MCHC 32.2 06/15/2021    RDW 14.8 05/01/2023    RDW 14.2 06/15/2021     05/01/2023     06/15/2021       Lab Results   Component Value Date     06/07/2023     07/12/2021    POTASSIUM 4.3 06/07/2023    POTASSIUM 4.2 01/17/2023    POTASSIUM 4.0 07/12/2021    CHLORIDE 101 06/07/2023    CHLORIDE 105 01/17/2023    CHLORIDE 102 11/04/2021    CHLORIDE 100 07/12/2021    CO2 31 (H) 06/07/2023    CO2 25  01/17/2023    CO2 26 07/12/2021    ANIONGAP 9 06/07/2023    ANIONGAP 12 01/17/2023    ANIONGAP 4 03/18/2021     (H) 06/07/2023     (H) 05/02/2023     (H) 01/17/2023     (A) 07/12/2021    BUN 12.1 06/07/2023    BUN 36 (H) 01/17/2023    BUN 18 07/12/2021    BUN 10 07/12/2021    CR 1.40 (H) 06/07/2023    CR 1.73 (A) 07/12/2021    GFRESTIMATED 50 (L) 06/07/2023    GFRESTIMATED 40 (L) 06/21/2021    GFRESTBLACK 46 (L) 06/21/2021    LLOYD 9.1 06/07/2023    LLOYD 9.3 07/12/2021      Lab Results   Component Value Date    AST 16 04/20/2023    AST 12 03/22/2021    ALT 6 (L) 04/20/2023    ALT 5 03/22/2021       Lab Results   Component Value Date    A1C 8.0 (H) 04/20/2023    A1C 7.7 (H) 11/20/2020       Lab Results   Component Value Date    INR 1.13 01/15/2023    INR 1.50 (H) 11/21/2020    INR 1.18 (H) 07/17/2020           Problem List:     Patient Active Problem List   Diagnosis     DM type 2, Hgb A1C 8.2 on 4/25/20     Mixed hyperlipidemia     Essential hypertension     Pain in limb     Plantar fascial fibromatosis     HYPERLIPIDEMIA LDL GOAL <100     Hemothorax on left     Recurrent Left Pleural effusion -- S/P Pleurex Cath 5/12/20     ABBIE (acute kidney injury) (H)     Acute respiratory failure with hypoxia (H)     Pulmonary hypertension (H)     CRF (chronic renal failure), stage 3      Anemia of chronic disease     Atrial fibrillation/flutter -- on Eliquis and Amiodarone     DKA (diabetic ketoacidoses)     Anemia in CKD (chronic kidney disease)     Dyspnea     SOB (shortness of breath)     Non-recurrent bilateral inguinal hernia without obstruction or gangrene     Acute cholecystitis     Abdominal pain, generalized     Non-intractable vomiting with nausea, unspecified vomiting type     Alcohol dependence (H)     CHF (congestive heart failure) (H)     Syncope and collapse     Weakness     Postoperative state     Acute kidney injury (H)     Chronic diastolic heart failure (H)     Nontraumatic  intraventricular intracerebral hemorrhage (H)     Anticoagulated     Right-sided nontraumatic intraventricular intracerebral hemorrhage (H)     Dehydration     Hypoglycemia     Hypoxia     DNR (do not resuscitate)     Hypotension, unspecified hypotension type     Diabetic nephropathy associated with type 2 diabetes mellitus (H)     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, or unspecified(403.00)     Nephrotic range proteinuria     Secondary hyperparathyroidism of renal origin (H)     Stage 3b chronic kidney disease (H)     Vitamin D deficiency     Diabetic ketoacidosis without coma associated with type 2 diabetes mellitus (H)           Medications:     Current Outpatient Medications   Medication Sig Dispense Refill     acetaminophen (TYLENOL) 325 MG tablet Take 2 tablets (650 mg) by mouth every 6 hours as needed for mild pain or fever  0     apixaban ANTICOAGULANT (ELIQUIS) 5 MG tablet Take 1 tablet (5 mg) by mouth 2 times daily 60 tablet 0     carvedilol (COREG) 12.5 MG tablet Take 1 tablet (12.5 mg) by mouth 2 times daily (with meals) 180 tablet 3     Continuous Blood Gluc  (DEXCOM G6 ) JOSE Dexcom G6    USE EACH WITH 10 DAYS SENSORS       finasteride (PROSCAR) 5 MG tablet Take 1 tablet (5 mg) by mouth daily  0     furosemide (LASIX) 40 MG tablet Take 1 tablet (40 mg) by mouth daily 90 tablet 3     glucagon 1 MG kit 1 mg as needed for low blood sugar       HUMALOG 100 UNIT/ML injection For refilling insulin pump       INSULIN PUMP - OUTPATIENT Medtronic device with no CGM and site change every 3 days   Sensitivity factor (midnight to midnight): 55  Bolus (units:gram): 9469-1286 = 1:9, 6404-1980 = 1:7, 1802-5584 = 1:7, 1183-2474 = 1:9, 5904-2090 = 1:13  Basal (units/hour): 5015-8832 = 0.45, 6406-9031 = 0.5, 7449-2821 = 0.625, 8153-5803 = 0.6, 6766-8824 = 0.45       irbesartan (AVAPRO) 150 MG tablet Take 1 tablet (150 mg) by mouth At Bedtime  90 tablet 3     potassium chloride ER (K-TAB/KLOR-CON) 10 MEQ CR tablet Take 1 tablet (10 mEq) by mouth daily 90 tablet 3     tamsulosin (FLOMAX) 0.4 MG capsule Take 0.4 mg by mouth every morning             Past Medical History:     Past Medical History:   Diagnosis Date     Anemia      Anemia of chronic disease 5/14/2020     Back pain      CKD (chronic kidney disease) stage 3, GFR 30-59 ml/min (H)      CRF (chronic renal failure), stage 3  5/14/2020     Fall 11/2019    suffered multiple left rib fractures, C3 and T2 fractures     Mixed hyperlipidemia 7/7/2004     Paroxysmal atrial fibrillation (H)      Personal history of colonic polyps 1972    gets colonoscopy every five years, due in 2006     Pleural effusion on left 11/2019    after multiple rib fractures     Pulmonary hypertension (H) 5/10/2020    Added automatically from request for surgery [id number]     Recurrent Left Pleural effusion -- S/P Pleurex Cath 5/12/20 12/30/2019     Rosacea 1989     Type II or unspecified type diabetes mellitus without mention of complication, not stated as uncontrolled 1999     Unspecified essential hypertension 1994     Past Surgical History:   Procedure Laterality Date     ANESTHESIA CARDIOVERSION N/A 2/3/2020    Procedure: ANESTHESIA, FOR CARDIOVERSION;  Surgeon: GENERIC ANESTHESIA PROVIDER;  Location:  OR      RIGHT HEART CATH MEASUREMENTS RECORDED N/A 5/13/2020    Procedure: Right Heart Cath;  Surgeon: Senthil Silva MD;  Location:  HEART CARDIAC CATH LAB     HC REMOVE TONSILS/ADENOIDS,<13 Y/O      T & A <11y.o.     IR CHEST TUBE DRAIN TUNNELED LEFT  5/12/2020     IR CHEST TUBE PLACEMENT NON-TUNNELLED LEFT  7/11/2020     IR CHEST TUBE REMOVAL NON TUNNELED LEFT  7/17/2020     IR CHEST TUBE REMOVAL TUNNELED LEFT  7/11/2020     LAPAROSCOPIC CHOLECYSTECTOMY N/A 11/22/2020    Procedure: CHOLECYSTECTOMY, LAPAROSCOPIC;  Surgeon: Annette Lambert MD;  Location:  OR     LAPAROSCOPIC HERNIORRHAPHY INGUINAL BILATERAL  Bilateral 10/27/2020    Procedure: LAPAROSCOPIC BILATERAL INGUINAL HERNIA REPAIR WITH MESH;  Surgeon: Brian Garsia MD;  Location: [facility] OR     Family History   Problem Relation Age of Onset     Family History Negative Mother          age 71     Family History Negative Father          age 70     Diabetes Brother         alive age 77     Diabetes Brother         alive age 76     Family History Negative Brother              Family History Negative Brother              Diabetes Sister         alive age74     Family History Negative Sister          age 86     Heart Disease Sister              Family History Negative Sister              Family History Negative Sister              Diabetes Sister      Diabetes Sister      Diabetes Brother      Diabetes Brother      Social History     Socioeconomic History     Marital status:      Spouse name: Lianna     Number of children: 4     Years of education: 16     Highest education level: Not on file   Occupational History     Occupation: [employer]     Employer: [employer]    Tobacco Use     Smoking status: Former     Packs/day: 0.20     Years: 40.00     Pack years: 8.00     Types: Cigarettes     Start date:      Quit date: 2008     Years since quitting: 15.4     Smokeless tobacco: Never   Vaping Use     Vaping status: Never Used   Substance and Sexual Activity     Alcohol use: Not Currently     Alcohol/week: 35.0 standard drinks of alcohol     Drug use: Not Currently     Sexual activity: Not on file   Other Topics Concern     Parent/sibling w/ CABG, MI or angioplasty before 65F 55M? Not Asked   Social History Narrative     Not on file     Social Determinants of Health     Financial Resource Strain: Not on file   Food Insecurity: Not on file   Transportation Needs: Not on file   Physical Activity: Not on file   Stress: Not on file   Social Connections: Not on file   Intimate Partner  Violence: Not on file   Housing Stability: Not on file           Allergies:   No known drug allergy    Today's clinic visit entailed:  Review of the result(s) of each unique test - BMP  Ordering of each unique test  I spent a total of 30 minutes on the day of the visit.   Time spent by me doing chart review, history and exam, documentation and further activities per the note  Provider  Link to MDM Help Grid     The level of medical decision making during this visit was of moderate complexity.

## 2023-06-08 NOTE — PROGRESS NOTES
"[facility]- [facility]    Spoke to Tc via telephone. I let him know that I can only give him a guesstimate on cost of HRS which has been around $30/ month for [facility] patients.    He expresses understanding and would like to continue to think about moving forward with this. I gave him C[facility] RN line to call back.    In the meantime, I advised Tc take daily weights and call C[facility] RN line if weight gain of 3lbs or more in a night or 5 lbs in a week, worsening swelling, or SOB. He expresses understanding with no further questions.    At OV 6/7/23 Ame Mejía PA-C writes, \"Plan: No changes today. I have asked him to continue compliance with meds and vitals, and to contact me with any rapid weight gain going forward, OR if his weight were to fall < 145 lbs -- in that case, I would back off on furosemide to 20 mg daily\"    Today, Tc tells me his weight is at 143lbs. He plans to take daily weights through the weekend and call C[facility] RN line on Monday 6/12/23 for update to be reviewed with Ame Mejía PA-C.    Recent Weights:  6/7/23: 146 lbs   6/8/23: 143 lbs    Addendum at 11:29AM: Tc called back stating he talked to his insurance company who is requesting codes to give him price estimate on HRS. I gave him our billing codes.          Emily Huston, RN, BSN  [facility]- [place], [place]  C.O.R.E. Clinic Care Coordinator  June 8, 2023 11:05 AM      Future Appointments   Date Time Provider Department Center   7/5/2023  9:15 AM Cony Julien DO [facility] CLIN       "

## 2023-06-09 NOTE — PATIENT INSTRUCTIONS
- continue apixaban 5 mg twice daily for afib and secondary stroke prevention   - LDL 52; not on statin therapy   - Blood pressure goal < 130/80  - close PCP follow up for monitoring of BP and blood glucose     Stroke Prevention Recommendations  High blood pressure, or hypertension, is a leading cause of stroke and the most significant controllable risk factor. You should aim to keep your blood pressure below 130/80.     Smoking cessation: The nicotine and carbon monoxide in cigarette smoke damage the cardiovascular system and pave the way for a stroke. If you use nicotine, please reach out to your primary care provider for assistance with quitting or consider utilizing one of [place]'s free quit support programs ([email or web address]/index.html)    If you have Type 1 or 2 diabetes, control you blood sugar. You should aim to keep your HGBA1C below 7.0.     Eat an overall health dietary pattern that emphasizes:  - a wide variety of fruits and vegetables   - whole grains and products made up of mostly whole grains   - healthy sources of protein (mostly plants such as legumes and nuts; fish and seafood; low-fat or non-fat dairy; and, if you eat meat and poultry, ensuring it is lean and unprocessed)  - liquid non-tropical vegetable oils  - minimally processed foods   - minimize intake of added sugars  - foods prepared with little or no salt  - limited or preferably no alcohol intake    Aim for being active at least 150 minutes a week, but if you don't want to sweat the numbers, just move more and sit less.    Excess body weight and obesity are linked with an increased risk of high blood pressure, diabetes, heart disease, and stroke. Losing as little as 5 to 10 pounds can make a significant difference in your risks. [facility] has a Comprehensive Weight Management program if you would like assistance/support:  https://[email or web address].[email or web address]/treatments/comprehensive-weight-management    Large amounts of cholesterol in the blood can build up and cause blood clots - leading to a stroke. Aim to keep your LDL cholesterol between 40 and 70.     Call 911 if these signs are present

## 2023-06-12 NOTE — PROGRESS NOTES
[facility] - [facility].E. Clinic    Attempt #1:  Called Tc (home) to get update on wt as Ame Mejía is considering cutting back on lasix if weight is dropping below 145 lbs.   left requesting call back.     Recent Weights:  6/7 146 lbs   6/8 143 lbs  6/9  6/10  6/11    Future Appointments   Date Time Provider Department Center   7/5/2023  9:15 AM Cony Julien DO [facility] PSA CLIN     Annette Mills, RN BSN   [facility]- [place], [place]  C.O.R.E. Clinic Care Coordinator  06/12/23, 1:16 PM

## 2023-06-13 NOTE — PROGRESS NOTES
"[facility] - [facility]    Received VM on CORE RN line from Tc who is returning call.  Called Tc back who gives update on wts.  He has not decided if he would like to enroll in HRS program yet.      At OV 6/7/23 Ame Mejía PA-C writes, \"Plan: No changes today. I have asked him to continue compliance with meds and vitals, and to contact me with any rapid weight gain going forward, OR if his weight were to fall < 145 lbs -- in that case, I would back off on furosemide to 20 mg daily\"    Recent Weights:  6/7       146 lbs   6/8       143 lbs  6/9 142 lbs  6/10 142 lbs  6/11 142 lbs  6/12 142 lbs  6/13 142 lbs    Future Appointments   Date Time Provider Department Center   7/5/2023  9:15 AM [facility]     Annette Mills RN BSN   [facility]- [place], [place]  C.O.R.E. Clinic Care Coordinator  06/13/23, 11:21 AM        "

## 2023-06-13 NOTE — PROGRESS NOTES
[facility]    Attempt #1:  Called Tc back and left detailed instructing him to decrease furosemide dose to 20 mg (1/2 tablet) daily.  Instructed him to call CORE RN if wt > 150 lbs as current goal wt is 145-150 lbs.  Requested he update Dr. Julien on 7/5 with his decision regarding HRS enrollment.   He will need repeat BMP prior to seeing Dr. Julien.  Requested he call scheduling to arrange appt. Requested call back to confirm they received and understood VM instructions.     BMP orders placed.    Updated med list to show furosemide 20 mg daily.  New Rx sent to preferred pharmacy.       Future Appointments   Date Time Provider Department Center   7/5/2023  9:15 AM Cony Julien DO [facility]       Annette Mills, RN BSN   [facility]- [place], [place]  C.O.RWAQAS. Clinic Care Coordinator  06/13/23, 1:21 PM

## 2023-06-13 NOTE — PROGRESS NOTES
Please go ahead and direct Tc to make the changes outlined.   Please remind him that he forgot to notify us of his weight loss, which is why we are recommending the HRS.   Can let us know what he wants to do when he sees Dr. Julien next month, but call us in the meantime if his weight trends back up > 150 lbs (145-150 is his goal).   He will need a BMP at his visit with Dr. Julien.  Thanks.    Ame Mejía PA-C  [facility]

## 2023-06-16 NOTE — PROGRESS NOTES
[facility]    Attempt #3:  Called Tc and left VM requesting he call back to confirm he received VM or MyChart instructions left on 6/13/23 and 6/14/23.      (addendum @ 1002):  Received VM on CORE RN line from Tc who confirms he received VM on 6/13/23 and has decreased lasix dose.      Future Appointments   Date Time Provider Department Center   6/30/2023 10:00 AM [facility]   7/5/2023  9:15 AM Cony Julien DO [facility]           Annette Mills, RN BSN   [facility]- [place], [place]  CLARE.O.RBOB Clinic Care Coordinator  06/16/23, 9:49 AM

## 2023-06-26 NOTE — TELEPHONE ENCOUNTER
[facility]- [facility]    Tc called requesting refill on his Eliquis. I called and left  stating I sent refill to Sara.    [facility] Refill Guideline reviewed.  Medication meets criteria for refill.    Received refill request for:  Eliquis  Last OV was: 23  Labs/EK23  F/U scheduled: 23  With Dr. Julien  New script sent to: Sara    Future Appointments   Date Time Provider Department Center   2023 10:00 AM [facility]   2023  9:15 [facility]       Emily Huston, RN, BSN  [facility] [place], [place]  C.O.R.E. Clinic Care Coordinator  2023 11:19 AM

## 2023-07-05 NOTE — PROGRESS NOTES
HPI and Plan:   Tc Garcia is a 83 year old male who presents with hypertension, heart failure with preserved ejection fraction, chronic kidney disease, and diabetes is here for a follow-up visit today.  He notes that his breathing is more labored when he is walking his dogs over the last year.  He is also suffering from ongoing low back pain.  He has sustained a traumatic fall a while ago with multiple fractures including his C3 spinous process fracture.  He is currently having low back pain and is scheduled for an epidural injection.  He has had a nontraumatic intercerebral hemorrhage.  Eliquis was held for several months but he is back on Eliquis without any bleeding complications.    He notes that his blood pressures have been elevated for the last 3 months.  I did review his measurements that he brought in today and they average between 1 30-1 60 systolic, majority of the numbers are in the 1 50-1 60 range.    He denies any orthopneic symptoms, denies any lightheadedness or dizziness.    Summary:    1.  Chronic atrial fibrillation- rate controlled with carvedilol.  He is on Eliquis for CVA prophylaxis.  He has had an intracerebral hemorrhage earlier this year.  He is back on Eliquis without evidence of any bleeding complications.    2.  Hypertension uncontrolled- I am going to increase his irbesartan to 300 mg today.  I changed his prescription with his pharmacy to reflect the increase.  I have asked him to continue to monitor his blood pressures.    3.  Chronic kidney disease- last measured creatinine was June 30 at 1.48 which is within his baseline range.  He has follow-up with nephrology in August.    4.  Heart failure with preserved ejection fraction- he has had a small pleural effusion in the left side chronically and does have some decreased breath sounds in the left base.  Clinical history does not suggest decompensation at this time.  We will continue with his current dose of Lasix    Please feel free  to contact me with any questions you have regards to his care.         Today's clinic visit entailed:    30 minutes spent by me on the date of the encounter doing chart review, history and exam, documentation and further activities per the note  Provider  Link to [facility] Help Grid     The level of medical decision making during this visit was of moderate complexity.    No orders of the defined types were placed in this encounter.    Orders Placed This Encounter   Medications     HYDROcodone-acetaminophen (NORCO) 5-325 MG tablet     Sig: TAKE 1/2-1 TABLET BY MOUTH AS NEEDED FOR PAIN. MAX OF 1 TABLET PER DAY     irbesartan (AVAPRO) 300 MG tablet     Sig: Take 0.5 tablets (150 mg) by mouth At Bedtime     Dispense:  90 tablet     Refill:  4     Medications Discontinued During This Encounter   Medication Reason     irbesartan (AVAPRO) 150 MG tablet          Encounter Diagnoses   Name Primary?     Chronic diastolic heart failure (H)      Essential hypertension      Acute on chronic heart failure with preserved ejection fraction (HFpEF) (H)      Chronic heart failure with preserved ejection fraction (HFpEF) (H)        CURRENT MEDICATIONS:  Current Outpatient Medications   Medication Sig Dispense Refill     acetaminophen (TYLENOL) 325 MG tablet Take 2 tablets (650 mg) by mouth every 6 hours as needed for mild pain or fever  0     apixaban ANTICOAGULANT (ELIQUIS) 5 MG tablet Take 1 tablet (5 mg) by mouth 2 times daily 180 tablet 3     carvedilol (COREG) 12.5 MG tablet Take 1 tablet (12.5 mg) by mouth 2 times daily (with meals) 180 tablet 3     Continuous Blood Gluc  (DEXCOM G6 ) JOSE Dexcom G6    USE EACH WITH 10 DAYS SENSORS       finasteride (PROSCAR) 5 MG tablet Take 1 tablet (5 mg) by mouth daily  0     furosemide (LASIX) 20 MG tablet Take 1 tablet (20 mg) by mouth daily 90 tablet 1     glucagon 1 MG kit 1 mg as needed for low blood sugar       HUMALOG 100 UNIT/ML injection For refilling insulin pump        HYDROcodone-acetaminophen (NORCO) 5-325 MG tablet TAKE 1/2-1 TABLET BY MOUTH AS NEEDED FOR PAIN. MAX OF 1 TABLET PER DAY       INSULIN PUMP - OUTPATIENT Medtronic device with no CGM and site change every 3 days   Sensitivity factor (midnight to midnight): 55  Bolus (units:gram): 3212-5928 = 1:9, 9062-3286 = 1:7, 8419-3422 = 1:7, 2385-9419 = 1:9, 5512-4228 = 1:13  Basal (units/hour): 6363-1735 = 0.45, 7357-4566 = 0.5, 9420-4777 = 0.625, 3326-0327 = 0.6, 7518-7938 = 0.45       irbesartan (AVAPRO) 300 MG tablet Take 0.5 tablets (150 mg) by mouth At Bedtime 90 tablet 4     potassium chloride ER (K-TAB/KLOR-CON) 10 MEQ CR tablet Take 1 tablet (10 mEq) by mouth daily 90 tablet 3     tamsulosin (FLOMAX) 0.4 MG capsule Take 0.4 mg by mouth every morning         ALLERGIES     Allergies   Allergen Reactions     No Known Drug Allergy        PAST MEDICAL HISTORY:  Past Medical History:   Diagnosis Date     Anemia      Anemia of chronic disease 5/14/2020     Back pain      CKD (chronic kidney disease) stage 3, GFR 30-59 ml/min (H)      CRF (chronic renal failure), stage 3  5/14/2020     Fall 11/2019    suffered multiple left rib fractures, C3 and T2 fractures     Mixed hyperlipidemia 7/7/2004     Paroxysmal atrial fibrillation (H)      Personal history of colonic polyps 1972    gets colonoscopy every five years, due in 2006     Pleural effusion on left 11/2019    after multiple rib fractures     Pulmonary hypertension (H) 5/10/2020    Added automatically from request for surgery [id number]     Recurrent Left Pleural effusion -- S/P Pleurex Cath 5/12/20 12/30/2019     Rosacea 1989     Type II or unspecified type diabetes mellitus without mention of complication, not stated as uncontrolled 1999     Unspecified essential hypertension 1994       PAST SURGICAL HISTORY:  Past Surgical History:   Procedure Laterality Date     ANESTHESIA CARDIOVERSION N/A 2/3/2020    Procedure: ANESTHESIA, FOR CARDIOVERSION;  Surgeon: GENERIC  ANESTHESIA PROVIDER;  Location:  OR     CV RIGHT HEART CATH MEASUREMENTS RECORDED N/A 2020    Procedure: Right Heart Cath;  Surgeon: Senthil Silva MD;  Location:  HEART CARDIAC CATH LAB     HC REMOVE TONSILS/ADENOIDS,<13 Y/O      T & A <11y.o.     IR CHEST TUBE DRAIN TUNNELED LEFT  2020     IR CHEST TUBE PLACEMENT NON-TUNNELLED LEFT  2020     IR CHEST TUBE REMOVAL NON TUNNELED LEFT  2020     IR CHEST TUBE REMOVAL TUNNELED LEFT  2020     LAPAROSCOPIC CHOLECYSTECTOMY N/A 2020    Procedure: CHOLECYSTECTOMY, LAPAROSCOPIC;  Surgeon: Annette Lambert MD;  Location:  OR     LAPAROSCOPIC HERNIORRHAPHY INGUINAL BILATERAL Bilateral 10/27/2020    Procedure: LAPAROSCOPIC BILATERAL INGUINAL HERNIA REPAIR WITH MESH;  Surgeon: Brian Garsia MD;  Location:  OR       FAMILY HISTORY:  Family History   Problem Relation Age of Onset     Family History Negative Mother          age 71     Family History Negative Father          age 70     Diabetes Brother         alive age 77     Diabetes Brother         alive age 76     Family History Negative Brother              Family History Negative Brother              Diabetes Sister         alive age74     Family History Negative Sister          age 86     Heart Disease Sister              Family History Negative Sister              Family History Negative Sister              Diabetes Sister      Diabetes Sister      Diabetes Brother      Diabetes Brother        SOCIAL HISTORY:  Social History     Socioeconomic History     Marital status:      Spouse name: Lianna     Number of children: 4     Years of education: 16     Highest education level: None   Occupational History     Occupation: [employer]     Employer: [employer]    Tobacco Use     Smoking status: Former     Packs/day: 0.20     Years: 40.00     Pack years: 8.00     Types: Cigarettes     Start  "date: 1968     Quit date: 1/1/2008     Years since quitting: 15.5     Smokeless tobacco: Never   Vaping Use     Vaping Use: Never used   Substance and Sexual Activity     Alcohol use: Not Currently     Alcohol/week: 35.0 standard drinks of alcohol     Drug use: Not Currently       Review of Systems:  Skin:        Eyes:       ENT:       Respiratory:  Positive for dyspnea on exertion  Cardiovascular:  Negative;palpitations;chest pain;syncope or near-syncope;cyanosis;lightheadedness;dizziness;edema;exercise intolerance;fatigue    Gastroenterology:      Genitourinary:       Musculoskeletal:  Positive for back pain  Neurologic:       Psychiatric:       Heme/Lymph/Imm:       Endocrine:  Positive for diabetes    Physical Exam:  Vitals: BP (!) 171/84   Pulse 77   Ht 1.753 m (5' 9\")   Wt 67.4 kg (148 lb 9.6 oz)   BMI 21.94 kg/m      Constitutional:           Skin:             Head:           Eyes:           Lymph:      ENT:           Neck:           Respiratory:            Cardiac:                                                           GI:           Extremities and Muscular Skeletal:                 Neurological:           Psych:           Recent Lab Results:  LIPID RESULTS:  Lab Results   Component Value Date    CHOL 112 01/17/2023    CHOL 116 05/13/2020    HDL 50 01/17/2023    HDL 60 05/13/2020    LDL 52 01/17/2023    LDL 43 05/13/2020    TRIG 51 01/17/2023    TRIG 65 05/13/2020    CHOLHDLRATIO 2.7 03/09/2009       LIVER ENZYME RESULTS:  Lab Results   Component Value Date    AST 16 04/20/2023    AST 12 03/22/2021    ALT 6 (L) 04/20/2023    ALT 5 03/22/2021       CBC RESULTS:  Lab Results   Component Value Date    WBC 7.9 05/01/2023    WBC 10.4 06/15/2021    RBC 4.52 05/01/2023    RBC 4.08 (A) 06/15/2021    HGB 12.4 (L) 05/01/2023    HGB 11.7 (A) 06/15/2021    HCT 38.4 (L) 05/01/2023    HCT 36.3 (A) 06/15/2021    MCV 85 05/01/2023    MCV 89 06/15/2021    MCH 27.4 05/01/2023    MCH 28.7 06/15/2021    MCHC 32.3 " 05/01/2023    MCHC 32.2 06/15/2021    RDW 14.8 05/01/2023    RDW 14.2 06/15/2021     05/01/2023     06/15/2021       BMP RESULTS:  Lab Results   Component Value Date     06/30/2023     07/12/2021    POTASSIUM 4.2 06/30/2023    POTASSIUM 4.2 01/17/2023    POTASSIUM 4.0 07/12/2021    CHLORIDE 100 06/30/2023    CHLORIDE 105 01/17/2023    CHLORIDE 102 11/04/2021    CHLORIDE 100 07/12/2021    CO2 32 (H) 06/30/2023    CO2 25 01/17/2023    CO2 26 07/12/2021    ANIONGAP 9 06/30/2023    ANIONGAP 12 01/17/2023    ANIONGAP 4 03/18/2021     (H) 06/30/2023     (H) 05/02/2023     (H) 01/17/2023     (A) 07/12/2021    BUN 12.9 06/30/2023    BUN 36 (H) 01/17/2023    BUN 18 07/12/2021    BUN 10 07/12/2021    CR 1.48 (H) 06/30/2023    CR 1.73 (A) 07/12/2021    GFRESTIMATED 46 (L) 06/30/2023    GFRESTIMATED 40 (L) 06/21/2021    GFRESTBLACK 46 (L) 06/21/2021    LLOYD 9.2 06/30/2023    LLOYD 9.3 07/12/2021        A1C RESULTS:  Lab Results   Component Value Date    A1C 8.0 (H) 04/20/2023    A1C 7.7 (H) 11/20/2020       INR RESULTS:  Lab Results   Component Value Date    INR 1.13 01/15/2023    INR 1.50 (H) 11/21/2020    INR 1.18 (H) 07/17/2020           CC  No referring provider defined for this encounter.

## 2023-07-05 NOTE — LETTER
7/5/2023    Jessika Ratliff Adalid  [address] [facility] [address]  [facility] [address]    RE: Tc Garcia       Dear Colleague,     I had the pleasure of seeing Tc Garcia in the [facility].  HPI and Plan:   Tc Garcia is a 83 year old male who presents with hypertension, heart failure with preserved ejection fraction, chronic kidney disease, and diabetes is here for a follow-up visit today.  He notes that his breathing is more labored when he is walking his dogs over the last year.  He is also suffering from ongoing low back pain.  He has sustained a traumatic fall a while ago with multiple fractures including his C3 spinous process fracture.  He is currently having low back pain and is scheduled for an epidural injection.  He has had a nontraumatic intercerebral hemorrhage.  Eliquis was held for several months but he is back on Eliquis without any bleeding complications.    He notes that his blood pressures have been elevated for the last 3 months.  I did review his measurements that he brought in today and they average between 1 30-1 60 systolic, majority of the numbers are in the 1 50-1 60 range.    He denies any orthopneic symptoms, denies any lightheadedness or dizziness.    Summary:    1.  Chronic atrial fibrillation- rate controlled with carvedilol.  He is on Eliquis for CVA prophylaxis.  He has had an intracerebral hemorrhage earlier this year.  He is back on Eliquis without evidence of any bleeding complications.    2.  Hypertension uncontrolled- I am going to increase his irbesartan to 300 mg today.  I changed his prescription with his pharmacy to reflect the increase.  I have asked him to continue to monitor his blood pressures.    3.  Chronic kidney disease- last measured creatinine was June 30 at 1.48 which is within his baseline range.  He has follow-up with nephrology in August.    4.  Heart failure with preserved ejection fraction- he has had a small pleural effusion in the left  side chronically and does have some decreased breath sounds in the left base.  Clinical history does not suggest decompensation at this time.  We will continue with his current dose of Lasix    Please feel free to contact me with any questions you have regards to his care.         Today's clinic visit entailed:    30 minutes spent by me on the date of the encounter doing chart review, history and exam, documentation and further activities per the note  Provider  Link to [facility] Help Grid     The level of medical decision making during this visit was of moderate complexity.    No orders of the defined types were placed in this encounter.    Orders Placed This Encounter   Medications    HYDROcodone-acetaminophen (NORCO) 5-325 MG tablet     Sig: TAKE 1/2-1 TABLET BY MOUTH AS NEEDED FOR PAIN. MAX OF 1 TABLET PER DAY    irbesartan (AVAPRO) 300 MG tablet     Sig: Take 0.5 tablets (150 mg) by mouth At Bedtime     Dispense:  90 tablet     Refill:  4     Medications Discontinued During This Encounter   Medication Reason    irbesartan (AVAPRO) 150 MG tablet          Encounter Diagnoses   Name Primary?    Chronic diastolic heart failure (H)     Essential hypertension     Acute on chronic heart failure with preserved ejection fraction (HFpEF) (H)     Chronic heart failure with preserved ejection fraction (HFpEF) (H)        CURRENT MEDICATIONS:  Current Outpatient Medications   Medication Sig Dispense Refill    acetaminophen (TYLENOL) 325 MG tablet Take 2 tablets (650 mg) by mouth every 6 hours as needed for mild pain or fever  0    apixaban ANTICOAGULANT (ELIQUIS) 5 MG tablet Take 1 tablet (5 mg) by mouth 2 times daily 180 tablet 3    carvedilol (COREG) 12.5 MG tablet Take 1 tablet (12.5 mg) by mouth 2 times daily (with meals) 180 tablet 3    Continuous Blood Gluc  (DEXCOM G6 ) JOSE Dexcom G6    USE EACH WITH 10 DAYS SENSORS      finasteride (PROSCAR) 5 MG tablet Take 1 tablet (5 mg) by mouth daily  0     furosemide (LASIX) 20 MG tablet Take 1 tablet (20 mg) by mouth daily 90 tablet 1    glucagon 1 MG kit 1 mg as needed for low blood sugar      HUMALOG 100 UNIT/ML injection For refilling insulin pump      HYDROcodone-acetaminophen (NORCO) 5-325 MG tablet TAKE 1/2-1 TABLET BY MOUTH AS NEEDED FOR PAIN. MAX OF 1 TABLET PER DAY      INSULIN PUMP - OUTPATIENT Medtronic device with no CGM and site change every 3 days   Sensitivity factor (midnight to midnight): 55  Bolus (units:gram): 7399-2957 = 1:9, 8873-4721 = 1:7, 1322-9841 = 1:7, 3501-4776 = 1:9, 4082-1252 = 1:13  Basal (units/hour): 0898-7699 = 0.45, 9227-0707 = 0.5, 2456-9917 = 0.625, 7049-7288 = 0.6, 7954-8518 = 0.45      irbesartan (AVAPRO) 300 MG tablet Take 0.5 tablets (150 mg) by mouth At Bedtime 90 tablet 4    potassium chloride ER (K-TAB/KLOR-CON) 10 MEQ CR tablet Take 1 tablet (10 mEq) by mouth daily 90 tablet 3    tamsulosin (FLOMAX) 0.4 MG capsule Take 0.4 mg by mouth every morning         ALLERGIES     Allergies   Allergen Reactions    No Known Drug Allergy        PAST MEDICAL HISTORY:  Past Medical History:   Diagnosis Date    Anemia     Anemia of chronic disease 5/14/2020    Back pain     CKD (chronic kidney disease) stage 3, GFR 30-59 ml/min (H)     CRF (chronic renal failure), stage 3  5/14/2020    Fall 11/2019    suffered multiple left rib fractures, C3 and T2 fractures    Mixed hyperlipidemia 7/7/2004    Paroxysmal atrial fibrillation (H)     Personal history of colonic polyps 1972    gets colonoscopy every five years, due in 2006    Pleural effusion on left 11/2019    after multiple rib fractures    Pulmonary hypertension (H) 5/10/2020    Added automatically from request for surgery [id number]    Recurrent Left Pleural effusion -- S/P Pleurex Cath 5/12/20 12/30/2019    Rosacea 1989    Type II or unspecified type diabetes mellitus without mention of complication, not stated as uncontrolled 1999    Unspecified essential hypertension 1994       PAST  SURGICAL HISTORY:  Past Surgical History:   Procedure Laterality Date    ANESTHESIA CARDIOVERSION N/A 2/3/2020    Procedure: ANESTHESIA, FOR CARDIOVERSION;  Surgeon: GENERIC ANESTHESIA PROVIDER;  Location:  OR    CV RIGHT HEART CATH MEASUREMENTS RECORDED N/A 2020    Procedure: Right Heart Cath;  Surgeon: Senthil Silva MD;  Location:  HEART CARDIAC CATH LAB    HC REMOVE TONSILS/ADENOIDS,<13 Y/O      T & A <11y.o.    IR CHEST TUBE DRAIN TUNNELED LEFT  2020    IR CHEST TUBE PLACEMENT NON-TUNNELLED LEFT  2020    IR CHEST TUBE REMOVAL NON TUNNELED LEFT  2020    IR CHEST TUBE REMOVAL TUNNELED LEFT  2020    LAPAROSCOPIC CHOLECYSTECTOMY N/A 2020    Procedure: CHOLECYSTECTOMY, LAPAROSCOPIC;  Surgeon: Annette Lambert MD;  Location:  OR    LAPAROSCOPIC HERNIORRHAPHY INGUINAL BILATERAL Bilateral 10/27/2020    Procedure: LAPAROSCOPIC BILATERAL INGUINAL HERNIA REPAIR WITH MESH;  Surgeon: Brian Garsia MD;  Location:  OR       FAMILY HISTORY:  Family History   Problem Relation Age of Onset    Family History Negative Mother          age 71    Family History Negative Father          age 70    Diabetes Brother         alive age 77    Diabetes Brother         alive age 76    Family History Negative Brother             Family History Negative Brother             Diabetes Sister         alive age76    Family History Negative Sister          age 86    Heart Disease Sister             Family History Negative Sister             Family History Negative Sister             Diabetes Sister     Diabetes Sister     Diabetes Brother     Diabetes Brother        SOCIAL HISTORY:  Social History     Socioeconomic History    Marital status:      Spouse name: Lianna    Number of children: 4    Years of education: 16    Highest education level: None   Occupational History    Occupation: [employer]     Employer: [employer]   Tobacco Use    Smoking status: Former     Packs/day: 0.20     Years: 40.00     Pack years: 8.00     Types: Cigarettes     Start date: 1968     Quit date: 1/1/2008     Years since quitting: 15.5    Smokeless tobacco: Never   Vaping Use    Vaping Use: Never used   Substance and Sexual Activity    Alcohol use: Not Currently     Alcohol/week: 35.0 standard drinks of alcohol    Drug use: Not Currently       Review of Systems:  Skin:        Eyes:       ENT:       Respiratory:  Positive for dyspnea on exertion  Cardiovascular:  Negative;palpitations;chest pain;syncope or near-syncope;cyanosis;lightheadedness;dizziness;edema;exercise intolerance;fatigue    Gastroenterology:      Genitourinary:       Musculoskeletal:  Positive for back pain  Neurologic:       Psychiatric:       Heme/Lymph/Imm:       Endocrine:  Positive for diabetes    Physical Exam:  Vitals: BP (!) 171/84   Pulse 77   Ht 1.753 m (5' 9\")   Wt 67.4 kg (148 lb 9.6 oz)   BMI 21.94 kg/m      Constitutional:           Skin:             Head:           Eyes:           Lymph:      ENT:           Neck:           Respiratory:            Cardiac:                                                           GI:           Extremities and Muscular Skeletal:                 Neurological:           Psych:           Recent Lab Results:  LIPID RESULTS:  Lab Results   Component Value Date    CHOL 112 01/17/2023    CHOL 116 05/13/2020    HDL 50 01/17/2023    HDL 60 05/13/2020    LDL 52 01/17/2023    LDL 43 05/13/2020    TRIG 51 01/17/2023    TRIG 65 05/13/2020    CHOLHDLRATIO 2.7 03/09/2009       LIVER ENZYME RESULTS:  Lab Results   Component Value Date    AST 16 04/20/2023    AST 12 03/22/2021    ALT 6 (L) 04/20/2023    ALT 5 03/22/2021       CBC RESULTS:  Lab Results   Component Value Date    WBC 7.9 05/01/2023    WBC 10.4 06/15/2021    RBC 4.52 05/01/2023    RBC 4.08 (A) 06/15/2021    HGB 12.4 (L) 05/01/2023    HGB 11.7 (A) 06/15/2021    HCT 38.4 (L) " 05/01/2023    HCT 36.3 (A) 06/15/2021    MCV 85 05/01/2023    MCV 89 06/15/2021    MCH 27.4 05/01/2023    MCH 28.7 06/15/2021    MCHC 32.3 05/01/2023    MCHC 32.2 06/15/2021    RDW 14.8 05/01/2023    RDW 14.2 06/15/2021     05/01/2023     06/15/2021       BMP RESULTS:  Lab Results   Component Value Date     06/30/2023     07/12/2021    POTASSIUM 4.2 06/30/2023    POTASSIUM 4.2 01/17/2023    POTASSIUM 4.0 07/12/2021    CHLORIDE 100 06/30/2023    CHLORIDE 105 01/17/2023    CHLORIDE 102 11/04/2021    CHLORIDE 100 07/12/2021    CO2 32 (H) 06/30/2023    CO2 25 01/17/2023    CO2 26 07/12/2021    ANIONGAP 9 06/30/2023    ANIONGAP 12 01/17/2023    ANIONGAP 4 03/18/2021     (H) 06/30/2023     (H) 05/02/2023     (H) 01/17/2023     (A) 07/12/2021    BUN 12.9 06/30/2023    BUN 36 (H) 01/17/2023    BUN 18 07/12/2021    BUN 10 07/12/2021    CR 1.48 (H) 06/30/2023    CR 1.73 (A) 07/12/2021    GFRESTIMATED 46 (L) 06/30/2023    GFRESTIMATED 40 (L) 06/21/2021    GFRESTBLACK 46 (L) 06/21/2021    LLOYD 9.2 06/30/2023    LLOYD 9.3 07/12/2021        A1C RESULTS:  Lab Results   Component Value Date    A1C 8.0 (H) 04/20/2023    A1C 7.7 (H) 11/20/2020       INR RESULTS:  Lab Results   Component Value Date    INR 1.13 01/15/2023    INR 1.50 (H) 11/21/2020    INR 1.18 (H) 07/17/2020           CC  No referring provider defined for this encounter.                    Thank you for allowing me to participate in the care of your patient.      Sincerely,     Cony Julien DO     [facility]

## 2023-07-27 NOTE — TELEPHONE ENCOUNTER
[facility] Call Center    Phone Message    May a detailed message be left on voicemail: yes     Reason for Call: Other: Pt would like a call back asap as he stated his BP is 100/95 and would like to discuss asap     Action Taken: Other: Cardio    Travel Screening: Not Applicable

## 2023-08-02 NOTE — TELEPHONE ENCOUNTER
[facility] Call Center    Phone Message    May a detailed message be left on voicemail: yes     Reason for Call: Per pt weight went down and BP went up, please reach out to further discuss.      Action Taken: Other: Cardiology    Travel Screening: Not Applicable    Thank you!  [facility]

## 2023-08-15 NOTE — TELEPHONE ENCOUNTER
See TE 7/27/23.  Closing encounter, patient has not returned multiple calls.  Staci Bruce RN on 8/15/2023 at 12:49 PM

## 2023-08-16 ENCOUNTER — APPOINTMENT (OUTPATIENT)
Dept: URBAN - METROPOLITAN AREA CLINIC 256 | Age: 83
Setting detail: DERMATOLOGY
End: 2023-08-16

## 2023-08-16 VITALS — WEIGHT: 145 LBS | HEIGHT: 69 IN

## 2023-08-16 DIAGNOSIS — L82.1 OTHER SEBORRHEIC KERATOSIS: ICD-10-CM

## 2023-08-16 DIAGNOSIS — D49.2 NEOPLASM OF UNSPECIFIED BEHAVIOR OF BONE, SOFT TISSUE, AND SKIN: ICD-10-CM

## 2023-08-16 DIAGNOSIS — L72.0 EPIDERMAL CYST: ICD-10-CM

## 2023-08-16 DIAGNOSIS — L738 OTHER SPECIFIED DISEASES OF HAIR AND HAIR FOLLICLES: ICD-10-CM

## 2023-08-16 DIAGNOSIS — L57.8 OTHER SKIN CHANGES DUE TO CHRONIC EXPOSURE TO NONIONIZING RADIATION: ICD-10-CM

## 2023-08-16 DIAGNOSIS — L663 OTHER SPECIFIED DISEASES OF HAIR AND HAIR FOLLICLES: ICD-10-CM

## 2023-08-16 DIAGNOSIS — Z71.89 OTHER SPECIFIED COUNSELING: ICD-10-CM

## 2023-08-16 DIAGNOSIS — D22 MELANOCYTIC NEVI: ICD-10-CM

## 2023-08-16 DIAGNOSIS — D18.0 HEMANGIOMA: ICD-10-CM

## 2023-08-16 DIAGNOSIS — L57.0 ACTINIC KERATOSIS: ICD-10-CM

## 2023-08-16 PROBLEM — D22.62 MELANOCYTIC NEVI OF LEFT UPPER LIMB, INCLUDING SHOULDER: Status: ACTIVE | Noted: 2023-08-16

## 2023-08-16 PROBLEM — L02.92 FURUNCLE, UNSPECIFIED: Status: ACTIVE | Noted: 2023-08-16

## 2023-08-16 PROBLEM — D18.01 HEMANGIOMA OF SKIN AND SUBCUTANEOUS TISSUE: Status: ACTIVE | Noted: 2023-08-16

## 2023-08-16 PROBLEM — D22.39 MELANOCYTIC NEVI OF OTHER PARTS OF FACE: Status: ACTIVE | Noted: 2023-08-16

## 2023-08-16 PROBLEM — D22.5 MELANOCYTIC NEVI OF TRUNK: Status: ACTIVE | Noted: 2023-08-16

## 2023-08-16 PROBLEM — D22.61 MELANOCYTIC NEVI OF RIGHT UPPER LIMB, INCLUDING SHOULDER: Status: ACTIVE | Noted: 2023-08-16

## 2023-08-16 PROCEDURE — OTHER LIQUID NITROGEN: OTHER

## 2023-08-16 PROCEDURE — OTHER MIPS QUALITY: OTHER

## 2023-08-16 PROCEDURE — 11102 TANGNTL BX SKIN SINGLE LES: CPT

## 2023-08-16 PROCEDURE — OTHER PATIENT SPECIFIC COUNSELING: OTHER

## 2023-08-16 PROCEDURE — OTHER COUNSELING: OTHER

## 2023-08-16 PROCEDURE — 17000 DESTRUCT PREMALG LESION: CPT | Mod: 59

## 2023-08-16 PROCEDURE — 99204 OFFICE O/P NEW MOD 45 MIN: CPT | Mod: 25

## 2023-08-16 PROCEDURE — OTHER PRESCRIPTION: OTHER

## 2023-08-16 PROCEDURE — OTHER EDUCATIONAL RESOURCES PROVIDED: OTHER

## 2023-08-16 PROCEDURE — OTHER PRESCRIPTION MEDICATION MANAGEMENT: OTHER

## 2023-08-16 PROCEDURE — 17003 DESTRUCT PREMALG LES 2-14: CPT

## 2023-08-16 PROCEDURE — OTHER PHOTO-DOCUMENTATION: OTHER

## 2023-08-16 PROCEDURE — OTHER BIOPSY BY SHAVE METHOD: OTHER

## 2023-08-16 RX ORDER — DOXYCYCLINE HYCLATE 50 MG/1
50MG CAPSULE, GELATIN COATED ORAL BID
Qty: 60 | Refills: 0 | Status: ERX | COMMUNITY
Start: 2023-08-16

## 2023-08-16 ASSESSMENT — BSA RASH: BSA RASH: 5

## 2023-08-16 ASSESSMENT — LOCATION DETAILED DESCRIPTION DERM
LOCATION DETAILED: LEFT ANTECUBITAL SKIN
LOCATION DETAILED: RIGHT VENTRAL PROXIMAL FOREARM
LOCATION DETAILED: LEFT VENTRAL PROXIMAL FOREARM
LOCATION DETAILED: RIGHT SUPERIOR CENTRAL MALAR CHEEK
LOCATION DETAILED: LEFT INFERIOR LATERAL MALAR CHEEK
LOCATION DETAILED: LEFT INFERIOR CENTRAL MALAR CHEEK
LOCATION DETAILED: LEFT SUPERIOR MEDIAL UPPER BACK
LOCATION DETAILED: RIGHT SUPERIOR POSTERIOR HELIX
LOCATION DETAILED: LEFT SUPERIOR HELIX
LOCATION DETAILED: SUPERIOR THORACIC SPINE
LOCATION DETAILED: LEFT CENTRAL MALAR CHEEK

## 2023-08-16 ASSESSMENT — LOCATION SIMPLE DESCRIPTION DERM
LOCATION SIMPLE: LEFT EAR
LOCATION SIMPLE: UPPER BACK
LOCATION SIMPLE: LEFT UPPER ARM
LOCATION SIMPLE: RIGHT CHEEK
LOCATION SIMPLE: RIGHT FOREARM
LOCATION SIMPLE: LEFT CHEEK
LOCATION SIMPLE: LEFT UPPER BACK
LOCATION SIMPLE: RIGHT EAR
LOCATION SIMPLE: LEFT FOREARM

## 2023-08-16 ASSESSMENT — LOCATION ZONE DERM
LOCATION ZONE: ARM
LOCATION ZONE: EAR
LOCATION ZONE: FACE
LOCATION ZONE: TRUNK

## 2023-08-16 ASSESSMENT — SEVERITY ASSESSMENT: SEVERITY: MODERATE

## 2023-08-16 ASSESSMENT — PAIN INTENSITY VAS: HOW INTENSE IS YOUR PAIN 0 BEING NO PAIN, 10 BEING THE MOST SEVERE PAIN POSSIBLE?: NO PAIN

## 2023-08-16 NOTE — PROCEDURE: PATIENT SPECIFIC COUNSELING
Detail Level: Zone
- discussed that the papules on his abdomen look similar to bites\\n- discussed that the papules on his back appear more pustule-like\\n- discussed that we are starting with doxycycline for one month to see if his condition improves or resolves

## 2023-08-16 NOTE — PROCEDURE: LIQUID NITROGEN
Render Note In Bullet Format When Appropriate: No
Show Aperture Variable?: Yes
Consent: The patient's consent was obtained including but not limited to risks of crusting, scabbing, blistering, scarring, darker or lighter pigmentary change, recurrence, incomplete removal and infection.
Duration Of Freeze Thaw-Cycle (Seconds): 5
Detail Level: Detailed
Application Tool (Optional): Liquid Nitrogen Sprayer
Post-Care Instructions: I reviewed with the patient in detail post-care instructions. Patient is to wear sunprotection, and avoid picking at any of the treated lesions. Pt may apply Vaseline to crusted or scabbing areas.
Number Of Freeze-Thaw Cycles: 2 freeze-thaw cycles

## 2023-08-16 NOTE — PROCEDURE: PRESCRIPTION MEDICATION MANAGEMENT
Render In Strict Bullet Format?: No
Initiate Treatment: Doxycycline 50mg (take one pill by mouth twice daily for one month)
Detail Level: Zone
Plan: Follow up in one month

## 2023-08-17 NOTE — TELEPHONE ENCOUNTER
Per last OV note 7/5/23:    2. Hypertension uncontrolled- I am going to increase his irbesartan to 300 mg today. I changed his prescription with his pharmacy to reflect the increase. I have asked him to continue to monitor his blood pressures.     Updated prescription returned to pharmacy.  Staci Bruce RN on 8/17/2023 at 1:12 PM

## 2023-08-29 ENCOUNTER — APPOINTMENT (OUTPATIENT)
Dept: URBAN - METROPOLITAN AREA CLINIC 256 | Age: 83
Setting detail: DERMATOLOGY
End: 2023-08-29

## 2023-08-29 PROBLEM — C44.319 BASAL CELL CARCINOMA OF SKIN OF OTHER PARTS OF FACE: Status: ACTIVE | Noted: 2023-08-29

## 2023-08-29 PROCEDURE — OTHER EDUCATIONAL RESOURCES PROVIDED: OTHER

## 2023-08-29 PROCEDURE — OTHER COUNSELING: OTHER

## 2023-08-29 PROCEDURE — OTHER MIPS QUALITY: OTHER

## 2023-08-29 PROCEDURE — 17282 DSTR MAL LS F/E/E/N/L/M1.1-2: CPT

## 2023-08-29 PROCEDURE — OTHER CURETTAGE AND DESTRUCTION: OTHER

## 2023-08-29 NOTE — PROCEDURE: CURETTAGE AND DESTRUCTION
Detail Level: Detailed
Number Of Curettages: 2
Size Of Lesion In Cm: 0.8
Size Of Lesion After Curettage: 1.2
Add Intralesional Injection: No
Concentration (Mg/Ml Or Millions Of Plaque Forming Units/Cc): 0.01
Total Volume (Ccs): 1
Anesthesia Type: 1% lidocaine with epinephrine
Cautery Type: electrodesiccation
What Was Performed First?: Curettage
Final Size Statement: The size of the lesion after curettage was
Additional Information: (Optional): The wound was cleaned, and a pressure dressing was applied.  The patient received detailed post-op instructions.
Consent was obtained from the patient. The risks, benefits and alternatives to therapy were discussed in detail. Specifically, the risks of infection, scarring, bleeding, prolonged wound healing, nerve injury, incomplete removal, allergy to anesthesia and recurrence were addressed. Alternatives to ED&C, such as: surgical removal and XRT were also discussed.  Prior to the procedure, the treatment site was clearly identified and confirmed by the patient. All components of Universal Protocol/PAUSE Rule completed.
Post-Care Instructions: I reviewed with the patient in detail post-care instructions. Patient is to keep the area dry for 48 hours, and not to engage in any swimming until the area is healed. Should the patient develop any fevers, chills, bleeding, severe pain patient will contact the office immediately.
Bill As A Line Item Or As Units: Line Item

## 2023-08-31 NOTE — PROGRESS NOTES
This is a recent snapshot of the patient's [facility] medical record.  For current drug dose and complete information and questions, call [phone] or In Basket pool, [facility] ([id number])  CSN Number:  [id number]       This was a shared visit with the VIJAYA. I reviewed and verified the documentation and independently performed the documented:

## 2023-09-14 NOTE — TELEPHONE ENCOUNTER
[facility] Call Center    Phone Message    May a detailed message be left on voicemail: yes     Reason for Call: Other: Tc called requesting a call back from his care team to discuss some things he needed to talk about but did not provide any more information. Please reach out to Tc to discuss. Thank you!     Action Taken: Other: Cardiology    Travel Screening: Not Applicable    Thank you!  [facility]

## 2023-09-14 NOTE — TELEPHONE ENCOUNTER
Returned call to patient, no answer, message left for patient to return call to clinic.  Staci Bruce RN on 9/14/2023 at 3:46 PM

## 2023-09-15 NOTE — TELEPHONE ENCOUNTER
Patient reports increased SOB over the last month when he exerts himself.  Patient denies swelling to extremities, no CP, dizziness/lightheadedness.  Patient states he has also had chronic diarrhea recently which has caused a 7 lb weight loss, so he has no documented weight gain.  Last OV 7/5/23, per notes:    Summary:     1.  Chronic atrial fibrillation- rate controlled with carvedilol.  He is on Eliquis for CVA prophylaxis.  He has had an intracerebral hemorrhage earlier this year.  He is back on Eliquis without evidence of any bleeding complications.     2.  Hypertension uncontrolled- I am going to increase his irbesartan to 300 mg today.  I changed his prescription with his pharmacy to reflect the increase.  I have asked him to continue to monitor his blood pressures.     3.  Chronic kidney disease- last measured creatinine was June 30 at 1.48 which is within his baseline range.  He has follow-up with nephrology in August.     4.  Heart failure with preserved ejection fraction- he has had a small pleural effusion in the left side chronically and does have some decreased breath sounds in the left base.  Clinical history does not suggest decompensation at this time.  We will continue with his current dose of Lasix      Will route to provider to advise.  Staci Bruce RN on 9/15/2023 at 8:55 AM

## 2023-09-15 NOTE — TELEPHONE ENCOUNTER
Cony Julien, Staci Downey, RN  Caller: Unspecified (Yesterday,  1:45 PM)  Increase lasix to 40mg, follow-up with BMP and NTBNP TAMAR next available.

## 2023-09-18 ENCOUNTER — APPOINTMENT (OUTPATIENT)
Dept: URBAN - METROPOLITAN AREA CLINIC 256 | Age: 83
Setting detail: DERMATOLOGY
End: 2023-09-18

## 2023-09-18 NOTE — TELEPHONE ENCOUNTER
Reviewed recommendations with patient.  He is not in agreement to increase his furosemide as he is worried about his recent weight loss. Patient did agree to come in for lab work and follow up appt with TAMAR.  Will message scheduling to call patient.  Staci Bruce RN on 9/18/2023 at 3:07 PM

## 2023-09-25 NOTE — PATIENT INSTRUCTIONS
Get hemoglobin lab draw at PCP today  STOP furosemide  START torsemide 20mg daily   Get labs drawn in 1 week to monitor kidney function  Echo prior to upcoming office visit  Complete patient assistance paperwork for Entresto  Follow up with cardiology in 1 month with labs prior  Please call with any questions/concerns [phone]

## 2023-09-25 NOTE — NURSING NOTE
Received request from Janae Watson LPN to print Entresto rx to assist with application to  patient assistance program.    Rx printed, signed by Lacie Tpaia CNP, and given to Janae GUERRERO.    Updated Team 1 RN Delia given this is a general cardiology patient. Advised her he'll need to stop irbesartan if he starts entresto.    Future Appointments   Date Time Provider Department Center   10/2/2023 10:45 AM CS LAB CSLABR CS   10/24/2023 10:00 AM [id number] SHCVCV CVIMG   10/26/2023  8:15 AM WEIR LAB [facility]   10/26/2023  9:00 AM [facility]         Debi Mills RN BSN   3:19 PM 09/25/23

## 2023-09-25 NOTE — PROGRESS NOTES
Pt was seen in clinic today, and will be applying for Entresto assistance with [employer].  Signed provider portion of the application, signed RX, medication list faxed to [phone]. Core Team did give pt his portion of the application and instruction to fill it out  Coretta eckert      9- I met w/pt today in clinic, he brought in his portion of the [employer] assistance application, I did a review and it was complete. 6 pages faxed to [employer].    I did also give him a 30 day free voucher for entresto. Pt is aware he will need to call CORE RN prior to starting this medication (med transition)  [facility] lpn    9- received a fax from [facility] PT HAS BEEN APPROVED-EFFECTIVE 9- THRU 12-  PT WILL NEED TO CALL [facility] TO GET THE SHIPMENT SET UP  APPROVAL NUMBER [id number]  PHYLLIS LIZANDRO

## 2023-09-25 NOTE — TELEPHONE ENCOUNTER
Called Tc back to explain that he needs a BMP on 10/2 and 10/26 per Lacie's notes, and that he does not need to be fasting.  We spoke about Entresto, and he understands that IF he begins taking that, he will need to stop taking irbesartan.

## 2023-09-25 NOTE — TELEPHONE ENCOUNTER
[facility] Call Center    Phone Message    May a detailed message be left on voicemail: yes     Reason for Call: Other: Does pt need both the non fasting labs on Oct. 2nd and on Oct. 26th or are they the same lab?  Please call pt to advise if one can be canceled or if both of them need to be kept.  Thank You     Action Taken: Message routed to:  [facility] cardio    Travel Screening: Not Applicable    Thank you!  [facility]

## 2023-09-25 NOTE — PROGRESS NOTES
Cardiology Clinic Progress Note  Tc Garcia MRN# [id number]   YOB: 1940 Age: 83 year old   Primary Cardiologist: Dr. Julien Reason for visit: Urgent cardiology follow up            Assessment and Plan:   Tc Garcia is a very pleasant 83 year old male here for urgent cardiology follow up.     1.  Acute on chronic HFpEF - 60-65%, RV moderately dilated with normal RV systolic function   - CHANGE to torsemide 20mg daily  2. Tricuspid regurgitation - moderate to severe   3. Hypertension - elevated  4. CKD  5. Chronic atrial fibrillation - on eliquis for thromboembolic prophylaxis, carvedilol 12.5mg BID  6. DM  7. Anemia     I saw Tc for the first time today for an urgent evaluation due to worsening dyspnea. His weight has been trending down, currently down ~ 10# since July. Despite this he has noted a significant progression of exertional dyspnea over the past few weeks. He dose not appear overtly volume overloaded on exam but his NTproBNP is elevated at 4,488. His furosemide was increased to 40mg with no significant improvement in symptoms. Renal function is stable. Blood pressures remain elevated. Will attempt to change his diuretic regimen to torsemide (equivocal dose) to see if we get better results. Additionally will get further evaluation with hemoglobin and echo.        Changes today: CHANGE furosemide to torsemide 20mg daily    Follow up plan:     Start patient assistance application for Entresto 49/51mg BID, this will replace his irbesartan.     BMP in 1 week    Hemoglobin today    Echocardiogram prior to upcoming OV    Cardiology follow up in 1 month with labs prior    Follow up with PCP regarding weight loss to evaluate other etiology    Depending on results of echo/labs will reach out to endcrinology regarding consideration for starting SGLT2i given insulin pump.         History of Presenting Illness:    Tc Garcia is a very pleasant 83 year old male with a history of HFpEF, chronic  "atrial fibrillation on eliquis, hypertension, HLD, tricuspid regurgitation, CKD, anemia and DM.       Winter 2023 he had multiple hospitalizations, January for nontraumatic intraventricular intracerebral hemorrhage in the setting of hypertension and anticoagulation with apixaban. Apixaban was on hold and has since been resumed with no difficulty. February presented to ED with \"seizure like activity\". April with DKA.     Echo April 2023 showed LVEF 60-65%, RV moderately dilated with normal RV systolic function, moderate to severe tricuspid regurgitation, mild aortic stenosis and mildly dilated ascending aorta.     He last saw Dr. Julien in July 2023 at which point he was doing well, blood pressures were elevated irbesartan increased to 300mg daily.     Patient is here today for urgent cardiology visit due to worsening shortness of breath.     Patient reports feeling good. Monitoring weights daily a home, notes it has been continuing to go down, which particularily was noticeable after increase in furosemide, but states had been losing weight prior to this as well, weight is down 136# at home, clinic weight is 139# which is down from 148# in July. Has had increased shortness of breath particularly with activity, started 2 weeks ago, unable to take the dogs for as long of walk. Has not noticed significant improvement with increased dose of furosemide 40mg. Denies orthopnea or PND. Denies LE edema. Denies chest pain or chest tightness. Denies dizziness, lightheadedness or other presyncopal symptoms. Denies tachycardia or palpitations. Denies episodes of bleeding. Taking medicine daily.     Labs from 9/20 showed stable kidney function, creatinine 1.48, stable electrolytes, NTproBNP up to 4,488. Blood pressure 142/92 and HR 86 in clinic today. Monitoring blood pressures at home which are elevated, reports 150-160s/70-80s,     Appetite has been okay, notes has been less. Eating most meals at home. Adding additional salt to " foods. No set exercise routine. Working with PT for his back, notes his back pain is improving. Rare alcohol use. Denies tobacco use.         Social History    Living at home with his wife, 2 dogs and a cat.   Social History     Socioeconomic History    Marital status:      Spouse name: Lianna    Number of children: 4    Years of education: 16    Highest education level: Not on file   Occupational History    Occupation: [email or web address]     Employer: [employer]    Tobacco Use    Smoking status: Former     Packs/day: 0.20     Years: 40.00     Pack years: 8.00     Types: Cigarettes     Start date: 1968     Quit date: 1/1/2008     Years since quitting: 15.7    Smokeless tobacco: Never   Vaping Use    Vaping Use: Never used   Substance and Sexual Activity    Alcohol use: Not Currently     Alcohol/week: 35.0 standard drinks of alcohol    Drug use: Not Currently    Sexual activity: Not on file   Other Topics Concern    Parent/sibling w/ CABG, MI or angioplasty before 65F 55M? Not Asked   Social History Narrative    Not on file     Social Determinants of Health     Financial Resource Strain: Not on file   Food Insecurity: Not on file   Transportation Needs: Not on file   Physical Activity: Not on file   Stress: Not on file   Social Connections: Not on file   Interpersonal Safety: Not on file   Housing Stability: Not on file            Review of Systems:   10 point ROS neg other than the symptoms noted above in the HPI.          Physical Exam:   Vitals: There were no vitals taken for this visit.   Wt Readings from Last 4 Encounters:   07/05/23 67.4 kg (148 lb 9.6 oz)   06/07/23 67.1 kg (148 lb)   05/26/23 75.5 kg (166 lb 8 oz)   05/02/23 63.6 kg (140 lb 3.2 oz)     GEN: well nourished, in no acute distress.  HEENT:  Pupils equal, round. Sclerae nonicteric.   NECK: Supple, no masses appreciated. JVP elevated at 90 degrees  C/V:  Irregularly irregular rate and rhythm  RESP: Respirations are unlabored. Clear to  auscultation bilaterally without wheezing, rales, or rhonchi.  GI: Abdomen soft, nontender.  EXTREM: No LE edema.  NEURO: Alert and oriented, cooperative.  SKIN: Warm and dry.        Data:     LIPID RESULTS:  Lab Results   Component Value Date    CHOL 112 01/17/2023    CHOL 116 05/13/2020    HDL 50 01/17/2023    HDL 60 05/13/2020    LDL 52 01/17/2023    LDL 43 05/13/2020    TRIG 51 01/17/2023    TRIG 65 05/13/2020    CHOLHDLRATIO 2.7 03/09/2009     LIVER ENZYME RESULTS:  Lab Results   Component Value Date    AST 16 04/20/2023    AST 12 03/22/2021    ALT 6 (L) 04/20/2023    ALT 5 03/22/2021     CBC RESULTS:  Lab Results   Component Value Date    WBC 7.9 05/01/2023    WBC 10.4 06/15/2021    RBC 4.52 05/01/2023    RBC 4.08 (A) 06/15/2021    HGB 12.4 (L) 05/01/2023    HGB 11.7 (A) 06/15/2021    HCT 38.4 (L) 05/01/2023    HCT 36.3 (A) 06/15/2021    MCV 85 05/01/2023    MCV 89 06/15/2021    MCH 27.4 05/01/2023    MCH 28.7 06/15/2021    MCHC 32.3 05/01/2023    MCHC 32.2 06/15/2021    RDW 14.8 05/01/2023    RDW 14.2 06/15/2021     05/01/2023     06/15/2021     BMP RESULTS:  Lab Results   Component Value Date     09/20/2023     07/12/2021    POTASSIUM 4.4 09/20/2023    POTASSIUM 4.2 01/17/2023    POTASSIUM 4.0 07/12/2021    CHLORIDE 102 09/20/2023    CHLORIDE 105 01/17/2023    CHLORIDE 102 11/04/2021    CHLORIDE 100 07/12/2021    CO2 29 09/20/2023    CO2 25 01/17/2023    CO2 26 07/12/2021    ANIONGAP 8 09/20/2023    ANIONGAP 12 01/17/2023    ANIONGAP 4 03/18/2021     (H) 09/20/2023     (H) 05/02/2023     (H) 01/17/2023     (A) 07/12/2021    BUN 10.7 09/20/2023    BUN 36 (H) 01/17/2023    BUN 18 07/12/2021    BUN 10 07/12/2021    CR 1.48 (H) 09/20/2023    CR 1.73 (A) 07/12/2021    GFRESTIMATED 46 (L) 09/20/2023    GFRESTIMATED 40 (L) 06/21/2021    GFRESTBLACK 46 (L) 06/21/2021    LLOYD 9.1 09/20/2023    LLOYD 9.3 07/12/2021      A1C RESULTS:  Lab Results   Component Value Date     A1C 8.0 (H) 04/20/2023    A1C 7.7 (H) 11/20/2020     INR RESULTS:  Lab Results   Component Value Date    INR 1.13 01/15/2023    INR 1.50 (H) 11/21/2020    INR 1.18 (H) 07/17/2020            Medications     Current Outpatient Medications   Medication Sig Dispense Refill    acetaminophen (TYLENOL) 325 MG tablet Take 2 tablets (650 mg) by mouth every 6 hours as needed for mild pain or fever  0    apixaban ANTICOAGULANT (ELIQUIS) 5 MG tablet Take 1 tablet (5 mg) by mouth 2 times daily 180 tablet 3    carvedilol (COREG) 12.5 MG tablet Take 1 tablet (12.5 mg) by mouth 2 times daily (with meals) 180 tablet 3    Continuous Blood Gluc  (DEXCOM G6 ) JOSE Dexcom G6    USE EACH WITH 10 DAYS SENSORS      finasteride (PROSCAR) 5 MG tablet Take 1 tablet (5 mg) by mouth daily  0    furosemide (LASIX) 20 MG tablet Take 1 tablet (20 mg) by mouth daily 90 tablet 1    glucagon 1 MG kit 1 mg as needed for low blood sugar      HUMALOG 100 UNIT/ML injection For refilling insulin pump      HYDROcodone-acetaminophen (NORCO) 5-325 MG tablet TAKE 1/2-1 TABLET BY MOUTH AS NEEDED FOR PAIN. MAX OF 1 TABLET PER DAY      INSULIN PUMP - OUTPATIENT Medtronic device with no CGM and site change every 3 days   Sensitivity factor (midnight to midnight): 55  Bolus (units:gram): 3188-8547 = 1:9, 0720-3269 = 1:7, 6598-5244 = 1:7, 6032-1212 = 1:9, 3012-4700 = 1:13  Basal (units/hour): 6779-2384 = 0.45, 9500-4522 = 0.5, 6675-4992 = 0.625, 4651-9736 = 0.6, 9261-5480 = 0.45      irbesartan (AVAPRO) 300 MG tablet Take 1 tablet (300 mg) by mouth At Bedtime 90 tablet 3    potassium chloride ER (K-TAB/KLOR-CON) 10 MEQ CR tablet Take 1 tablet (10 mEq) by mouth daily 90 tablet 3    tamsulosin (FLOMAX) 0.4 MG capsule Take 0.4 mg by mouth every morning          Past Medical History     Past Medical History:   Diagnosis Date    Anemia     Anemia of chronic disease 5/14/2020    Back pain     CKD (chronic kidney disease) stage 3, GFR 30-59  ml/min (H)     CRF (chronic renal failure), stage 3  2020    Fall 2019    suffered multiple left rib fractures, C3 and T2 fractures    Mixed hyperlipidemia 2004    Paroxysmal atrial fibrillation (H)     Personal history of colonic polyps 1972    gets colonoscopy every five years, due in     Pleural effusion on left 2019    after multiple rib fractures    Pulmonary hypertension (H) 5/10/2020    Added automatically from request for surgery [id number]    Recurrent Left Pleural effusion -- S/P Pleurex Cath 2019    Rosacea     Type II or unspecified type diabetes mellitus without mention of complication, not stated as uncontrolled     Unspecified essential hypertension      Past Surgical History:   Procedure Laterality Date    ANESTHESIA CARDIOVERSION N/A 2/3/2020    Procedure: ANESTHESIA, FOR CARDIOVERSION;  Surgeon: GENERIC ANESTHESIA PROVIDER;  Location:  OR     RIGHT HEART CATH MEASUREMENTS RECORDED N/A 2020    Procedure: Right Heart Cath;  Surgeon: Senthil Silva MD;  Location:  HEART CARDIAC CATH LAB    HC REMOVE TONSILS/ADENOIDS,<13 Y/O      T & A <11y.o.    IR CHEST TUBE DRAIN TUNNELED LEFT  2020    IR CHEST TUBE PLACEMENT NON-TUNNELLED LEFT  2020    IR CHEST TUBE REMOVAL NON TUNNELED LEFT  2020    IR CHEST TUBE REMOVAL TUNNELED LEFT  2020    LAPAROSCOPIC CHOLECYSTECTOMY N/A 2020    Procedure: CHOLECYSTECTOMY, LAPAROSCOPIC;  Surgeon: Annette Lambert MD;  Location:  OR    LAPAROSCOPIC HERNIORRHAPHY INGUINAL BILATERAL Bilateral 10/27/2020    Procedure: LAPAROSCOPIC BILATERAL INGUINAL HERNIA REPAIR WITH MESH;  Surgeon: Brian Garsia MD;  Location:  OR     Family History   Problem Relation Age of Onset    Family History Negative Mother          age 71    Family History Negative Father          age 70    Diabetes Brother         alive age 77    Diabetes Brother         alive age 76    Family History  Negative Brother             Family History Negative Brother             Diabetes Sister         alive age74    Family History Negative Sister          age 86    Heart Disease Sister             Family History Negative Sister             Family History Negative Sister             Diabetes Sister     Diabetes Sister     Diabetes Brother     Diabetes Brother             Allergies   No known drug allergy    40 minutes spent on the date of the encounter doing chart review, history and exam, documentation and further activities as noted above      [facility]  Pager: [phone]

## 2023-09-26 NOTE — TELEPHONE ENCOUNTER
[facility] Call Center    Phone Message    May a detailed message be left on voicemail: yes     Reason for Call: Medication Question or concern regarding medication   Prescription Clarification  Name of Medication: unknown, pt does not remember the name of the pill  Prescribing Provider: Lacie   Pharmacy:    [id number] [place], [place] - [address] AT [facility] [address]   What on the order needs clarification? Patient referred to assistance to help pay for the medication. Please call patient with name of medication so he can check and see if its covered by insurance or if he should use an assistance program       Action Taken: Other: cardio    Travel Screening: Not Applicable

## 2023-09-26 NOTE — TELEPHONE ENCOUNTER
Called back to pt, advised the medication is Entresto 49-51 mg twice daily. Pt stated he will call his insurance to check coverage. Per pt he has the patient portion of the patient assistance application and is aware he should return this to see if he qualifies for the patient assistance program. Pt will call back if any further questions.

## 2023-09-27 NOTE — TELEPHONE ENCOUNTER
[facility] Call Center    Phone Message    May a detailed message be left on voicemail: yes     Reason for Call: Other: Pt would like a call back to discuss the form he is suppose to fill out for medical assistance , Please reach out to pt to discuss     Action Taken: Other: Cardio    Travel Screening: Not Applicable

## 2023-09-29 NOTE — TELEPHONE ENCOUNTER
Called and spoke with Tc.  Per Janae's note below, he was approved for Entresto assistance, and he reported that Janae already spoke with him today about this.  Asked that he call our Team 1 RN line when he receives the Entresto in the mail and to NOT take it until we talk.  At that time, we'll review again that he should stop irbesartan when he begins Entresto.  We will then know his Entresto start date.  Pt agreeable to this.  Staff Message sent to Lacie Tapia with update.      Per 9/25/23 OV with Lacie Tapia:  Follow up plan:      Start patient assistance application for Entresto 49/51mg BID, this will replace his irbesartan.         Dilma Rene, RN  P [facility] Heart Team 1  FYI! This is actually not a CORE patient, its a general [place] pt, looks like Debi and Janae had helped with starting [employer] pt assistance. He was approved!          Previous Messages       ----- Message -----  From: Janae Watson LPN  Sent: 9/29/2023   9:46 AM CDT  To: [facility] Heart Core Nurse  Subject: [employer] APPROVAL                                Just an FYI-pt has been seen at both  and [facility] so not sure which Team needs this Update-But pt was Just APPROVED for entresto, effective today 9- thru 12-    Janae Robin lpn, LPN Miller, Amy K, RN  [employer] supplies through  a pharmacy in [facility]-however I gave him a 30 day free card , and he thought his script was sent to a local pharmacy ~Janae Montgomery LPN Miller, Amy K, RN  I will be doing the renewal steven in December along with 140 others~kervin eckert

## 2023-10-02 NOTE — TELEPHONE ENCOUNTER
"Medication dose changed.  [facility] Cardiology Refill Guideline reviewed.  Medication meets criteria for refill.  BMP ordered.  Message sent to scheduling requesting they call Tc to set up a BMP in 1 week.  Called and spoke with Tc.  Communicated Lacie's recommendations below.  Obtained weights, BP readings, and symptoms over the past few days:    9/29: 131 lb - /90, HR 66 - felt fairly normal, no symptoms or edema  9/30: 130 lb - /70, HR 83- started to feel, \"absolutely fatigued,\" had to lay down during the day, felt increased shortness of breath, able to sleep lying flat with no issues  10/1: 129 lbs - /77, HR 72 - same symptoms as 9/30  10/2: 128 lbs, /82, HR 80 - same symptoms as 9/30    BP values are prior to his morning medications.  Asked Tc if he could begin checking his BP 2 hours after taking his morning medications and he was agreeable to this.     Over the past week he has lost about 1 lb per day.  He reported he has had diarrhea for the past 3 months and has been seeing a provider (?[facility]) regarding that issue.    Routing to Lacie with update.         Lacie Tapia, OMAR CNP  10/2/2023 12:17 PM CDT Back to Top      Reviewed BMP which shows decline in renal function. Decrease Torsemide to 10mg daily. Repeat BMP in 7-10 days.     Team 1 - can you please call patient to follow up on weight/symptoms and advise on torsemide change. Can you also help coordinate repeat BMP. Will need repeat BMP prior to initiation of entresto as want to ensure stable renal function.       "

## 2023-10-03 NOTE — TELEPHONE ENCOUNTER
Reviewed phone encounter, no further changes than decreasing torsemide as previously advised. Will await repeat BMP in ~ 1 week.

## 2023-10-13 NOTE — TELEPHONE ENCOUNTER
Tc left a v/m on Team 1 RN requesting a call back regarding Entresto.  Called pt back, and no answer.  Left detailed message requesting call back to Team 1 RN.  Connected with Tc.  He stated he got a call from [facility] and his Entresto will arrived Tuesday.  He will hold off on starting his Entresto until we hear from Lacie.      He had his follow-up BMP checked yesterday, 10/12, to check his kidney function after reducing his torsemide to 10mg daily on 10/2/23 per Lacie Tapia.      Routing to Debary for review and recommendations.

## 2023-10-14 NOTE — ED NOTES
Bed: ED21  Expected date:   Expected time:   Means of arrival:   Comments:  Sai 544 83M high blood sugar 520 200/95 tea 0122

## 2023-10-14 NOTE — ED PROVIDER NOTES
History     Chief Complaint:  Hyperglycemia       HPI   Tc Garcia is a 83 year old male with a history of insulin-dependent diabetes who presents to us because of high blood sugar.  The patient notes that he is typically well controlled with his sugars, watching him very closely on his lifestyle lan.  He has an insulin pump which seems to control his sugars well, and he will at times give himself short acting insulin subcu when he has acute spikes in his levels.  He notes that his blood sugars were normal all throughout the day today.  When he ate tomato soup and bread tonight, he gave himself a few extra units of insulin as he carb counted what he was taking in.  However, he was then surprised that his alarm told him that his blood sugar was high.  He checked it several more times and gave him some some further insulin through his pump.  He wanted to give himself some short acting insulin subcu but could not find any hypodermic needles.  He became alarmed when the blood sugar continued to be elevated, prompting him to call EMS.  He was clear with me that he would not have called EMS if his blood sugar had been in a more normal range because he otherwise has no other physical concerns for me here tonight.  He otherwise denies any recent illnesses or fever.  He denies pain.  He has no other concerns at this juncture.      Independent Historian:   None - Patient Only    Review of External Notes:   Yes-I reviewed his admission to this hospital in April 2023 when he was dealing with several days of high blood sugars and went into diabetic ketoacidosis.      Medications:    acetaminophen (TYLENOL) 325 MG tablet  apixaban ANTICOAGULANT (ELIQUIS) 5 MG tablet  carvedilol (COREG) 12.5 MG tablet  Continuous Blood Gluc  (DEXCOM G6 ) JOSE  doxycycline hyclate (VIBRAMYCIN) 50 MG capsule  finasteride (PROSCAR) 5 MG tablet  glucagon 1 MG kit  HUMALOG 100 UNIT/ML injection  HYDROcodone-acetaminophen (NORCO)  5-325 MG tablet  INSULIN PUMP - OUTPATIENT  irbesartan (AVAPRO) 300 MG tablet  potassium chloride ER (K-TAB/KLOR-CON) 10 MEQ CR tablet  sacubitril-valsartan (ENTRESTO) 49-51 MG per tablet  tamsulosin (FLOMAX) 0.4 MG capsule  torsemide (DEMADEX) 10 MG tablet  traMADol (ULTRAM-ER) 100 MG 24 hr tablet        Past Medical History:    Past Medical History:   Diagnosis Date    Anemia     Anemia of chronic disease 5/14/2020    Back pain     CKD (chronic kidney disease) stage 3, GFR 30-59 ml/min (H)     CRF (chronic renal failure), stage 3  5/14/2020    Fall 11/2019    Mixed hyperlipidemia 7/7/2004    Paroxysmal atrial fibrillation (H)     Personal history of colonic polyps 1972    Pleural effusion on left 11/2019    Pulmonary hypertension (H) 5/10/2020    Recurrent Left Pleural effusion -- S/P Pleurex Cath 5/12/20 12/30/2019    Rosacea 1989    Type II or unspecified type diabetes mellitus without mention of complication, not stated as uncontrolled 1999    Unspecified essential hypertension 1994       Past Surgical History:    Past Surgical History:   Procedure Laterality Date    ANESTHESIA CARDIOVERSION N/A 2/3/2020    Procedure: ANESTHESIA, FOR CARDIOVERSION;  Surgeon: GENERIC ANESTHESIA PROVIDER;  Location:  OR     RIGHT HEART CATH MEASUREMENTS RECORDED N/A 5/13/2020    Procedure: Right Heart Cath;  Surgeon: Senthil Silva MD;  Location:  HEART CARDIAC CATH LAB    HC REMOVE TONSILS/ADENOIDS,<11 Y/O      T & A <11y.o.    IR CHEST TUBE DRAIN TUNNELED LEFT  5/12/2020    IR CHEST TUBE PLACEMENT NON-TUNNELLED LEFT  7/11/2020    IR CHEST TUBE REMOVAL NON TUNNELED LEFT  7/17/2020    IR CHEST TUBE REMOVAL TUNNELED LEFT  7/11/2020    LAPAROSCOPIC CHOLECYSTECTOMY N/A 11/22/2020    Procedure: CHOLECYSTECTOMY, LAPAROSCOPIC;  Surgeon: Annette Lambert MD;  Location:  OR    LAPAROSCOPIC HERNIORRHAPHY INGUINAL BILATERAL Bilateral 10/27/2020    Procedure: LAPAROSCOPIC BILATERAL INGUINAL HERNIA REPAIR WITH MESH;   Surgeon: Brian Garsia MD;  Location:  OR        Physical Exam   Patient Vitals for the past 24 hrs:   BP Pulse Resp SpO2   10/14/23 0225 130/76 62 18 98 %        Physical Exam  Eye:  Pupils are equal, round, and reactive.  Extraocular movements intact.    ENT:  No rhinorrhea.  Moist mucus membranes.  Normal tongue and tonsil.    Cardiac:  Regular rate and rhythm.  No murmurs, gallops, or rubs.    Pulmonary:  Clear to auscultation bilaterally.  No wheezes, rales, or rhonchi.    Abdomen:  Positive bowel sounds.  Abdomen is soft and non-distended, without focal tenderness.    Musculoskeletal:  Normal movement of all extremities without evidence for deficit.    Skin:  Warm and dry without rashes.    Neurologic:  Non-focal exam without asymmetric weakness or numbness.     Psychiatric:  Normal affect with appropriate interaction with examiner.      Emergency Department Course       Laboratory:  Labs Ordered and Resulted from Time of ED Arrival to Time of ED Departure   GLUCOSE BY METER - Abnormal       Result Value    GLUCOSE BY METER POCT 429 (*)    BASIC METABOLIC PANEL - Abnormal    Sodium 138      Potassium 3.9      Chloride 99      Carbon Dioxide (CO2) 25      Anion Gap 14      Urea Nitrogen 28.4 (*)     Creatinine 1.74 (*)     GFR Estimate 38 (*)     Calcium 9.0      Glucose 454 (*)    GLUCOSE BY METER - Abnormal    GLUCOSE BY METER POCT 326 (*)           Emergency Department Course & Assessments:      Interventions:  Medications   insulin aspart (NovoLOG) injection (RAPID ACTING) (6 Units Subcutaneous $Given 10/14/23 0204)        Independent Interpretation (X-rays, CTs, rhythm strip):  None    Consultations/Discussion of Management or Tests:  None        Social Determinants of Health affecting care:   None    Disposition:  The patient was discharged to home.     Impression & Plan      Medical Decision Making:  This delightful 83-year-old insulin-dependent diabetic presents to us because of high blood  sugars today.  He typically uses an insulin pump, though he has short acting insulin that he can give himself subcutaneous if he has higher blood sugars.  Because he ran out of needles and syringes, he was unable to provide himself with insulin when he had higher blood sugars tonight.    On my exam, he appears clinically well.  He notes that he would not have come in if his meter had not been reading high over the past 3 to 4 hours.  Laboratory investigation shows no evidence of metabolic derangement.  He was given subcu insulin here with improvement in his blood sugar.  I see no indication for admission to the hospital or further work-up.  He was discharged home with 5 further insulin syringes that he can use.  He is very savvy on checking his sugars and treating his elevated blood sugars appropriately.  He feels comfort with the plan for discharge home, understand that he needs to follow-up with his primary team for ongoing supplies.  Questions were answered and he is discharged home in stable condition.      Diagnosis:    ICD-10-CM    1. Hyperglycemia  R73.9                Chad A. Trierweiler, MD  10/14/2023   Trierweiler, Chad A, MD Trierweiler, Chad A, MD  10/15/23 0154

## 2023-10-14 NOTE — ED TRIAGE NOTES
Patient arrives by EMS from his home. Patient c/o BG in 500s tonight. Patient states he misplaced his insulin needles and has been giving himself extra insulin through his pump. Patient denies any N/V or pain. BG in

## 2023-10-16 NOTE — TELEPHONE ENCOUNTER
Reviewed phone encounter. Reviewed labs from 10/12 and 10/14, no improvement in renal function. He has also had an ED visit for hyperglycemia on 10/14. Decline in renal function may eb related to chronic worsening in setting of elevated blood pressures and elevated blood sugars.     Team 1 - can you please get additional heart failure information including weights, symptoms (swelling/shortness of breath). Confirm diuretic dosing.   Added on NTproBNP to labs from 10/14  Will consider nephrology referral  Do not start entresto at this time, once I review additional information will provide guidance on entresto.     OMAR Murrieta [facility]  Pager: [phone]

## 2023-10-16 NOTE — PROGRESS NOTES
Clinic Care Coordination Contact  Clinic Care Coordination Contact  OUTREACH    Referral Information:  Referral Source: IP Report. Pt was in the ED with hyperglycemia on 10/14.         Chief Complaint   Patient presents with    Clinic Care Coordination - Post Hospital     SW Outreach        [place] Utilization:      Utilization      No Show Count (past year)  2             ED Visits  5             Hospital Admissions  3                    Current as of: 10/15/2023 10:51 PM                Clinical Concerns:  Current Medical Concerns:  Diabetes, CKD, back pain, anemia and a-fib.    Current Behavioral Concerns: None    Education Provided to patient: SW role and recommended follow-up     Medication Management:  Medication review status: Pt reports that he has had diabetes for ~25 years. In the past he has found his blood sugars difficult to control, however since using the Lucho he has found his blood sugar easier to control.      Functional Status:  Dependent ADLs:: Independent  Dependent IADLs:: Independent  Bed or wheelchair confined:: No  Mobility Status: Independent  Fallen 2 or more times in the past year?: No  Any fall with injury in the past year?: No    Living Situation:  Current living arrangement:: I live in a private home with spouse  [facility]    Lifestyle & Psychosocial Needs:  Pt is a 83 yr old  male who resides in [place] with his spouse. He reports that his spouse is in good health for the most part but does have some GI issues (IBS).     Pt has 4 children, 3 who are living. Unfortunately, their oldest daughter passed away 2 years ago, she was in her 60's at the time of her death.     Pt drives and usually brings himself to his appointments.     Social Determinants of Health     Food Insecurity: Low Risk  (10/16/2023)    Food Insecurity     Within the past 12 months, did you worry that your food would run out before you got money to buy more?: No     Within  the past 12 months, did the food you bought just not last and you didn t have money to get more?: No   Depression: Not at risk (5/25/2023)    PHQ-2     PHQ-2 Score: 0   Housing Stability: Low Risk  (10/16/2023)    Housing Stability     Do you have housing? : Yes     Are you worried about losing your housing?: No   Tobacco Use: Medium Risk (9/25/2023)    Patient History     Smoking Tobacco Use: Former     Smokeless Tobacco Use: Never     Passive Exposure: Not on file   Financial Resource Strain: Low Risk  (10/16/2023)    Financial Resource Strain     Within the past 12 months, have you or your family members you live with been unable to get utilities (heat, electricity) when it was really needed?: No   Alcohol Use: Not At Risk (10/27/2020)    AUDIT-C     Frequency of Alcohol Consumption: Never     Average Number of Drinks: Not on file     Frequency of Binge Drinking: Not on file   Transportation Needs: Low Risk  (10/16/2023)    Transportation Needs     Within the past 12 months, has lack of transportation kept you from medical appointments, getting your medicines, non-medical meetings or appointments, work, or from getting things that you need?: No   Physical Activity: Not on file   Interpersonal Safety: Not on file   Stress: Not on file   Social Connections: Not on file              Latter day or spiritual beliefs that impact treatment:: No  Mental health DX:: No  Mental health management concern (GOAL):: No  Chemical Dependency Status: No Current Concerns  Informal Support system:: Children, Spouse        Resources and Interventions:  Current Resources: None     Community Resources: None  Supplies Currently Used at Home: Diabetic Supplies  Equipment Currently Used at Home: walker, rolling  Employment Status: retired         Advance Care Plan/Directive  Advanced Care Plans/Directives on file:: No    Referrals Placed: None    Call placed to pt to check in on how he has been doing since his discharge home from the ED.  "Pt reports that he is doing \"great\" and his blood pressure has been good, he has been checking it. Pt reports that he is glad that he was not admitted and feels it's because he acted more quickly this time. In the past there have been concerns that pt was having difficulty managing his diabetes but he reports that he feels it is under better control since he has gotten the Lucho. Pt reports that he has follow-up scheduled with his Endocrinologist on Wed, follow-up with PCP Dr. Cordoba in a few days and follow up with his spine doctor tomorrow. Pt was appreciative for the call and denied having specific questions/concerns for writer at this time.       Patient/Caregiver understanding: Yes       Future Appointments                In 1 week [id number], [facility]    In 1 month [facility], [place] [facility]    In 1 month Lacie Tapia, APRN CNP [facility], [facility] PSA CLIN            Plan: BERTHA BORDEN will not plan further outreach at this time.    Ronda Enrique [facility]  Social Work Care Coordinator  Phone: [phone]   "

## 2023-10-17 NOTE — TELEPHONE ENCOUNTER
Reviewed phone encounter. During most recent labs no improvement in renal function noted. He shares that he has continued to have diarrhea.     He has upcoming follow up with PCP and nephrologist next week.     At this time given continued diarrhea and decline in renal function recommend changing torsemide to 10mg PRN for weight gain > 3# overnight or worsening shortness of breath/swelling. This likely will need to return to scheduled dosing at some point in the future once his diarrhea has resolved.     Patient scheduled for cardiology follow up the beginning of November.     OMAR Murrieta CNP  [facility]  Pager: [phone]

## 2023-10-17 NOTE — TELEPHONE ENCOUNTER
Called and spoke with Tc.  Communicated Lacie's recommendations below.  He understands his is not to start Entresto for the time being due to his elevated creatinine.      He states he feels okay overall, but continues to have diarrhea.  He will see his PCP Thursday about this.  He will see his endocrinologist tomorrow, and his nephrologist next week.      Weights:  10/4-10/9: 130 lbs  10/10-10/11: 133 lbs  10/12-10/17: 135 lbs     Blood Pressure: (he checks 2 hours after taking his morning medications)  10/16: 119/61  10/15: 138/77  10/14: 124/69  10/12: 128/65  10/11: 132/53  10/10: 119/67  10/9: 107/57    His heart rate runs in the 60s.    Swellling: denies    Shortness of breath: no different than usual.  He reports a little when walking, but that is typical for him.     Diuretic dosing: torsemide reduced to 10 mg daily on 10/2/23 for Cr 1.75.  Confirmed Tc has been taking this dose since 10/2/23.    Routing update to aLcie for review and recommendations.             October 15, 2023  Lacie Tapia APRN CNP [facility]    10/15/23  9:35 PM  Note  Reviewed phone encounter. Reviewed labs from 10/12 and 10/14, no improvement in renal function. He has also had an ED visit for hyperglycemia on 10/14. Decline in renal function may eb related to chronic worsening in setting of elevated blood pressures and elevated blood sugars.      Team 1 - can you please get additional heart failure information including weights, symptoms (swelling/shortness of breath). Confirm diuretic dosing.   Added on NTproBNP to labs from 10/14  Will consider nephrology referral  Do not start entresto at this time, once I review additional information will provide guidance on entresto.      OMAR Murrieta CNP  [facility]  Pager: [phone]

## 2023-10-17 NOTE — TELEPHONE ENCOUNTER
Called Tc to communicate Lacie's recommendations below.  Tc is agreeable to this plan.  He plans to call his nephrologist's office to inquire is he should have labwork prior to his upcoming appointment with them.  Reminded Tc of the following appointments:    Future cardiology appointments:  10/24 Echo  11/20 Labs  11/20 OV with Lacie Brooks APRN CNP [facility]    10/17/23  4:05 PM  Note  Reviewed phone encounter. During most recent labs no improvement in renal function noted. He shares that he has continued to have diarrhea.      He has upcoming follow up with PCP and nephrologist next week.      At this time given continued diarrhea and decline in renal function recommend changing torsemide to 10mg PRN for weight gain > 3# overnight or worsening shortness of breath/swelling. This likely will need to return to scheduled dosing at some point in the future once his diarrhea has resolved.      Patient scheduled for cardiology follow up the beginning of November.      OMAR Murrieta CNP  [facility]  Pager: [phone]

## 2023-10-27 NOTE — ED TRIAGE NOTES
Pt was feeling nausea and had an emesis episode then her fell down at around 2100. Fell to his buttocks, no complains from the fall. On eliquis, Denies hitting his head.      Triage Assessment (Adult)       Row Name 10/26/23 6389          Triage Assessment    Airway WDL WDL        Respiratory WDL    Respiratory WDL WDL        Skin Circulation/Temperature WDL    Skin Circulation/Temperature WDL WDL        Cardiac WDL    Cardiac WDL WDL        Peripheral/Neurovascular WDL    Peripheral Neurovascular WDL WDL        Cognitive/Neuro/Behavioral WDL    Cognitive/Neuro/Behavioral WDL WDL

## 2023-10-27 NOTE — ED PROVIDER NOTES
"  History     Chief Complaint:  Vomiting and Fall       HPI   Tc Garcia is a 83 year old male anticoagulated on Eliquis with a history of DM II, a-fib, CKD, and hypertension who presents for evaluation of a fall that occurred at 2000. He states he finished urinating, and he got up to walk to the sink when he slipped and fell on his bottom. He denies hitting his head or sustaining any injuries. Additionally he mentions he ate spicy red sauce at a Mexican restaurant and has been nauseated and vomited once. He denies black or bloody stool, fevers, dysuria, difficulty urinating, abdominal pain, chest pain, back pain, shortness of breath, leg swelling, or unexpected weight gain.      Independent Historian:   His son adds that the patient has been more active recently by walking around at the mall.    Review of External Notes:   ED note from 10/14    Medications:    Carvedilol   Emollient   Glucagon   Lantus   Avapro   Melatonin 5mg   Eliquis  Tamsulosin      Past Medical History:    Anemia   CKD stage 3   CRF stage 3  Hyperlipidemia   Atrial fibrillation   Colonic polyps  Pleural effusion of left  Rosacea   Diabetes mellitus type 2  Hypertension      Past Surgical History:   Cardioversion   Heart cath  Tonsillectomy   Adenoidectomy   Chest tube drain tunneled   Chest tube placed   Chest tube removed  Cholecystectomy   Herniorrhaphy inguinal       Physical Exam   Patient Vitals for the past 24 hrs:   BP Temp Temp src Pulse Resp SpO2 Height Weight   10/26/23 2235 118/70 99.8  F (37.7  C) Temporal 90 15 94 % 1.753 m (5' 9\") 63.5 kg (140 lb)        Physical Exam  VS: Reviewed per above  HENT: Mucous membranes moist, no nuchal rigidity  EYES: sclera anicteric  CV: Rate as noted,  regular rhythm.   RESP: Effort normal. Breath sounds are normal bilaterally.  GI: no tenderness/rebound/guarding, not distended.  NEURO: GCS 15, cranial nerves II through XII are intact, 5 out of 5 strength in all 4 extremities, sensation is " intact light touch in all 4 extremities  MSK: No deformity of the extremities  SKIN: Warm and dry      Emergency Department Course     Imaging:  CT Head w/o Contrast   Final Result   IMPRESSION:   1.  No CT finding of a mass, hemorrhage or focal area suggestive of acute infarct.   2.  Stable diffuse age related changes.           Laboratory:  Labs Ordered and Resulted from Time of ED Arrival to Time of ED Departure   COMPREHENSIVE METABOLIC PANEL - Abnormal       Result Value    Sodium 132 (*)     Potassium 4.2      Carbon Dioxide (CO2) 24      Anion Gap 13      Urea Nitrogen 59.3 (*)     Creatinine 2.43 (*)     GFR Estimate 26 (*)     Calcium 9.5      Chloride 95 (*)     Glucose 208 (*)     Alkaline Phosphatase 54      AST 16      ALT 10      Protein Total 6.2 (*)     Albumin 3.7      Bilirubin Total 0.6     LIPASE - Abnormal    Lipase 7 (*)    CBC WITH PLATELETS AND DIFFERENTIAL - Abnormal    WBC Count 13.0 (*)     RBC Count 4.09 (*)     Hemoglobin 11.0 (*)     Hematocrit 34.3 (*)     MCV 84      MCH 26.9      MCHC 32.1      RDW 14.7      Platelet Count 202      % Neutrophils 82      % Lymphocytes 3      % Monocytes 14      % Eosinophils 0      % Basophils 0      % Immature Granulocytes 1      NRBCs per 100 WBC 0      Absolute Neutrophils 10.7 (*)     Absolute Lymphocytes 0.4 (*)     Absolute Monocytes 1.8 (*)     Absolute Eosinophils 0.0      Absolute Basophils 0.0      Absolute Immature Granulocytes 0.1      Absolute NRBCs 0.0          Emergency Department Course & Assessments:    Interventions:  Medications - No data to display     Assessments:  0332 I obtained history and examined the patient, as noted above.    Independent Interpretation (X-rays, CTs, rhythm strip):  None    Consultations/Discussion of Management or Tests:  None        Social Determinants of Health affecting care:   None    Disposition:  The patient was discharged to home.     Impression & Plan      Medical Decision Making:  Patient presents  to the ER for evaluation after fall in bathroom and episode of vomiting after eating spicy Mexican food.  On arrival vital signs are reassuring.  No focal neurological deficits.  CT head negative for acute pathology.  Based on history and exam, no other signs of occult traumatic injury.  Labs revealed acute kidney injury on CKD as well as nonspecific leukocytosis.  He has history of CHF.  At this juncture, he does not have obvious signs of CHF flare.  I will encourage him to continue taking adequate fluids and have his renal function rechecked after the weekend.  No apparent infectious pathology at this juncture.  His abdominal exam is benign and he has no further vomiting or stool changes.  Overall lower suspicion for obstructive or other sinister intra-abdominal pathology meriting advanced imaging.  Suspect his vomiting was related to spicy food, per patient report.  Patient and family feel comfortable with discharge home.  Return precautions discussed prior to discharge.      Diagnosis:    ICD-10-CM    1. Vomiting, unspecified vomiting type, unspecified whether nausea present  R11.10       2. Fall, initial encounter  W19.XXXA       3. ABBIE (acute kidney injury) (H24)  N17.9            Discharge Medications:  New Prescriptions    No medications on file        Scribe Disclosure:  Izaiah HALEY, am serving as a scribe at 3:35 AM on 10/27/2023 to document services personally performed by Urban Gutierres MD based on my observations and the provider's statements to me.   10/27/2023   Urban Gutierres MD Lindenbaum, Elan, MD  10/27/23 0715

## 2023-10-27 NOTE — DISCHARGE INSTRUCTIONS
Your kidney function was a bit worse today compared to 2 weeks ago.  Please have this rechecked next week in the clinic setting to make sure it is not getting worse.  Be sure to stay adequately hydrated.  Return to the ER for worsening symptoms or new concerns.    Discharge Instructions  Vomiting    You have been seen today for vomiting (throwing up). This is usually caused by a virus, but some bacteria, parasites, medicines or other medical conditions can cause similar symptoms. At this time your provider does not find that your vomiting is a sign of anything dangerous or life-threatening. However, sometimes the signs of serious illness do not show up right away. If you have new or worse symptoms, you may need to be seen again in the Emergency Department or by your primary provider. Remember that serious problems like appendicitis can start as vomiting.    Generally, every Emergency Department visit should have a follow-up clinic visit with either a primary or a specialty clinic/provider. Please follow-up as instructed by your emergency provider today.    Return to the Emergency Department if:  You keep vomiting and you are not able to keep liquids down.   You feel you are getting dehydrated, such as being very thirsty, not urinating (peeing) at least every 8-12 hours, or feeling faint or lightheaded.   You develop a new fever, or your fever continues for more than 2 days.   You have abdominal (belly pain) that seems worse than cramps, is in one spot, or is getting worse over time. Appendicitis usually causes pain in the right lower abdomen (to the right and below your belly button) so watch for pain in this location.  You have blood in your vomit or stools.   You feel very weak.  You are not starting to improve within 24 hours of your visit here.     What can I do to help myself?  The most important thing to do is to drink clear liquids. If you have been vomiting a lot, it is best to have only small, frequent sips  of liquids. Drinking too much at once may cause more vomiting. If you are vomiting often, you must replace minerals, sodium and potassium lost with your illness. Pedialyte  is the best available rehydration liquid but some find that it doesn t taste good so sports drinks are an alterative. You can also drink clear liquids such as water, weak tea, apple juice, and 7-Up . Avoid acid liquids (orange), caffeine (coffee) or alcohol. Do not drink milk until you no longer have diarrhea (loose stools).   After liquids are staying down, you may start eating mild foods. Soda crackers, toast, plain noodles, gelatin, applesauce and bananas are good first choices. Avoid foods that have acid, are spicy, fatty or have a lot of fiber (such as meats, coarse grains, vegetables). You may start eating these foods again in about 3 days when you are better.   Sometimes treatment includes prescription medicine to prevent nausea (sick to your stomach) and vomiting. If your provider prescribes these for you, take them as directed.   Do not take ibuprofen, naproxen, or other nonsteroidal anti-inflammatory (NSAID) medicines without checking with your healthcare provider.     If you were given a prescription for medicine here today, be sure to read all of the information (including the package insert) that comes with your prescription.  This will include important information about the medicine, its side effects, and any warnings that you need to know about.  The pharmacist who fills the prescription can provide more information and answer questions you may have about the medicine.  If you have questions or concerns that the pharmacist cannot address, please call or return to the Emergency Department.     Remember that you can always come back to the Emergency Department if you are not able to see your regular provider in the amount of time listed above, if you get any new symptoms, or if there is anything that worries you.

## 2023-11-07 NOTE — TELEPHONE ENCOUNTER
"Pt called into the Call Center stating he is sleeping more than usual and experiencing dizziness.  Called Tc to discuss.    Tc explained his dizziness occurred this morning when he first woke up, prior to taking his morning medications.  He was walking to his front door and felt dizzy.  He did not feel as if he was going to pass out.  It resolved after about one minutes.  He reports no other dizzy episodes.    His wife is observing him sleeping more than usual over the past 2-3 weeks.  I inquired with Tc what about how much he typically sleeps.  He typically sleeps 9-10 hrs/night and there has been no change to this.  He also typically takes a mid-morning nap for a few hours and this is not a change.  His sleeping habits he reported do not indicate a change from his usual.    His overall energy feels pretty normal.  He has been busy doing work around the house.  He reports he continues to have \"constant\" diarrhea and has a GI appt on 11/16.    Weight:  11/7: 130 lbs  10/12-10/17: 135 lbs    BP - 2 hours after morning medications:  11/7: 108/66, HR 63  11/6: 129/66, HR 64  11/5: 116/72, HR 80  11/4: 125/67, HR 78  10/16: 119/61    Swelling - denies  SOB - denies     Diuretic dose: Torsemide - did not stop as advised on 10/17, he does not recall this conversation.  Reviewed Lacie's recommendations from 10/17 again.  Pt agreed he will remove his torsemide from his 14-day pill organizer.    Routing to Lacie with update and for any further recommendations.        BACKGROUND  9/25 LOV with Lacie  10/14 ED for hyperglycemia  10/17 No improvement in renal function; torsemide change to 10 mg PRN for weight gain > 3# overnight or worsening SOB/swelling per Lacie Tapia.  Unable to start Entresto due to elevated creatinine.  10/24 Echo done  10/26 OV with Dr Chandler, Nephrology.  Creatine worse at 2.43.  10/26 ED for vomiting after eating spicy Mexican food and fall in bathroom    11/20 Labs  11/20 Follow-up with Lacie        "

## 2023-11-07 NOTE — TELEPHONE ENCOUNTER
[facility] Call Center    Phone Message    May a detailed message be left on voicemail: yes     Reason for Call: Other: patient is calling because lately he has been having  some more symptoms which include increased Sleeping a lot and has more dizziness lately, please advise, thank you.      Action Taken: Other: cardiology    Travel Screening: Not Applicable\  Thank you!  [facility]

## 2023-11-08 NOTE — TELEPHONE ENCOUNTER
ANTICOAGULATION DIRECT ORAL ANTICOAGULANT MONITORING    SUBJECTIVE     The [facility] is evaluating Tc Garcia's Apixaban (Eliquis) as part of its Anticoagulation Monitoring Program.    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 Has also been stopped for several months in 2023 for bleeding concerns.    OBJECTIVE   Age: 83 year old  Wt Readings from Last 2 Encounters:   10/26/23 63.5 kg (140 lb)   10/14/23 60.8 kg (134 lb)      Lab Results   Component Value Date    CR 2.43 (H) 10/26/2023    CR 1.74 (H) 10/14/2023    CR 1.60 (H) 10/12/2023     Lab Results   Component Value Date    HGB 11.0 10/26/2023    HGB 11.7 06/15/2021     10/26/2023     06/15/2021     ASSESSMENT/PLAN     A chart review for Direct Oral Anticoagulant (DOAC) Stewardship has been completed for:     Dosing: recommend adjustment to Apixaban 2.5 mg BID for age >= 80 years and creatinine >= 1.5 mg/dL (consistent with package insert dosing)    Lab monitoring: BMP recommended every 6 months, next due April 2024    Plan made per [facility] anticoagulation protocol    Piper Aaron, RN  Anticoagulation Clinic

## 2023-11-09 NOTE — TELEPHONE ENCOUNTER
Torsemide dose updated previously on pt's medication list on 10/17/23, when originally recommended by Lacie.      Called and spoke with Tc to communicate Lacie's recommendations below.      BMP ordered.  Message sent to scheduling requesting they call Tc to set up his lab appointment.     Confirmed he did stop torsemide as advised by Lacie previously.    Weight today: 132 lbs  BP today: 105/53    Her reports he feels good today, and has been for the past few days.  He gets a little dizzy every once in a while, but feels good overall.        November 9, 2023  Lacie Tapia APRN CNP J[facility]    11/9/23  9:56 AM  Note  Reviewed phone encounter.      Agree with changing torsemide to PRN as recommended during phone encounter on October 17th.       Reviewed nephrology note from 10/26, no changes made at that time, further decline in renal function noted, creatinine 2.43.      Has GI follow up next week.      RECOMMENDATIONS  Change torsemide to 10mg PRN  Repeat OMAR Lazcano CNP  [facility]  Pager: [phone]

## 2023-11-09 NOTE — TELEPHONE ENCOUNTER
Reviewed phone encounter.     Agree with changing torsemide to PRN as recommended during phone encounter on October 17th.      Reviewed nephrology note from 10/26, no changes made at that time, further decline in renal function noted, creatinine 2.43.     Has GI follow up next week.     RECOMMENDATIONS  Change torsemide to 10mg PRN  Repeat BMP     OMAR Murrieta CNP  [facility]  Pager: [phone]

## 2023-11-14 NOTE — TELEPHONE ENCOUNTER
Reviewed pharmacy note.     Agree with recommendations to decrease apixaban to 2.5mg BID given elevated creatinine and age > 80.     As noted in previous phone encounters patient needs repeat BMP given significant decline in renal function. This has not been scheduled yet. Recommend repeat BMP tomorrow.     Team 1 - can you please update patient on recommendations to decrease apixaban 2.5mg BID and please help coordinate urgent BMP.     OMAR Murrieta CNP  [facility]  Pager: [phone]

## 2023-11-14 NOTE — TELEPHONE ENCOUNTER
Called and spoke with pt, reviewed Lacie's recommendation to decrease Eliquis to 2.5 mg twice daily and have labs rechecked tomorrow. Pt verbalized understanding and agreement with plan. Lab appointment held for pt tomorrow, 11/15/23 at 3:30 pm and message sent to scheduling. Prescription for Eliquis 2.5 mg BID sent to pharmacy.

## 2023-11-15 PROBLEM — I61.5 NONTRAUMATIC INTRAVENTRICULAR INTRACEREBRAL HEMORRHAGE (H): Status: RESOLVED | Noted: 2023-01-15 | Resolved: 2023-01-01

## 2023-11-15 NOTE — PROGRESS NOTES
"Virtual Visit Details    Type of service:  Telephone Visit   Phone call duration: 10 minutes     _____________________________________________________________    [facility]         [phone]  _____________________________________________________________      Chief Complaint: Patient presents with:  Stroke      History of Present Illness: Tc Garcia is a 83 year old male presenting for follow-up for multifocal stroke and IVH.     He was hospitalized at [facility] on 1/15-01/27/23 . Prior to the hospital stay, he had a past medical history of atrial fibrillation on apixaban, HTN, HLD, DM, CKD.     He presented to the hospital with intermittent amnestic episodes and encephalopathy for days. BP in the /120. Head CT showed R lateral ventricle IVH, CTA showed no abnormality. Hemorrhage felt to be related to hypertension. Given K centra for reversal of coagulopathy. MRI showed scattered areas of acute/subacute appearing infarcts within both cerebral hemispheres and the right basal ganglia which were felt to be cardioembolic due to atrial fibrillation and missed apixaban doses.      Anticoagulation continued to be held at the time of discharge due to hemorrhage. He was hospitalized 1/29-1/31/2023 for hyperglycemia, hypotension and ABBIE. Seen by our service in the ED 2/11/2023 with \"seizure like activity\" - per chart review noted to have some stiffening but no extremity shaking and decreased LOC with blood glucose at the time noted to be 33. Head CT showed resolving hemorrhage.      He was discharged to TCU and then home. He is able to perform ADLs/IADLs including driving without any concerns. He denies any residual deficits from his stroke.      He was hospitalized in April 2023 with DKA. Stroke service was consulted for questions regarding anticoagulation. He was under consideration for/interested in ASPIRE clinical trial; cardiologist ultimately recommended against " it. CT and MRI were obtained during admission with interval improvement in hemorrhage burden and no evidence of underlying lesion. He was restarted on apixaban 5 mg twice daily.      He is following closely with cardiology clinic for medication and symptom management; they have been periodically adjusting medications. He is hoping his kidney function improves so he can start entresto.     He had an ED visit in mid-October for hyperglycemia and again late-October after a fall, luckily he did not sustain any injuries.     Stroke Evaluation summarized:  MRI 4/25: no acute pathology, chronic hemorrhage products paralleling the lateral aspect of the right lateral ventricle likely related to the previous hemorrhage arising from the right temporal lobe; No recent hemorrhage; No evidence for enhancing lesion in the right temporal lobe; Generalized brain atrophy and presumed microvascular ischemic changes     MRI 1/15: Right parietal IPH w/ IVH into right lateral ventricle w/ small amount of left lateral ventricle hemorrhage; moderate edema surrounding hemorrhage; scattered acute/subacute infarcts in cerebral hemispheres and right basal ganglia; moderate diffuse volume loss and white matter changes; several probably chronic cerebellar microhemorrhages; right cerebral convexity meningioma      Head CT 1/15:  Large isolated intraventricular hemorrhage of the right lateral ventricle; Minimal interval increase in size of the occipital horn of the right lateral ventricle. Ventricular size and morphology is otherwise no change; Probable moderate sequela of chronic small vessel ischemic disease.    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  He will call us with any questions.  For any acute neurologic deficits he was advised to  go directly to the hospital rather than call the clinic.    Ame Carvajal NP  Neurology  11/16/2023 3:28 PM  To page me or covering stroke neurology team member, click here: [facility]  Choose \"On Call\" tab at top, then search dropdown box for \"Neurology Adult\" & press Enter, look for Neuro ICU/Stroke    ___________________________________________________________________    Current Medications  Current Outpatient Medications   Medication    apixaban ANTICOAGULANT (ELIQUIS) 2.5 MG tablet    carvedilol (COREG) 12.5 MG tablet    Continuous Blood Gluc  (DEXCOM G6 ) JOSE    HUMALOG 100 UNIT/ML injection    HYDROcodone-acetaminophen (NORCO) 5-325 MG tablet    INSULIN PUMP - OUTPATIENT    irbesartan (AVAPRO) 300 MG tablet    potassium chloride ER (K-TAB/KLOR-CON) 10 MEQ CR tablet    tamsulosin (FLOMAX) 0.4 MG capsule    traMADol (ULTRAM-ER) 100 MG 24 hr tablet    acetaminophen (TYLENOL) 325 MG tablet    doxycycline hyclate (VIBRAMYCIN) 50 MG capsule    finasteride (PROSCAR) 5 MG tablet    glucagon 1 MG kit    sacubitril-valsartan (ENTRESTO) 49-51 MG per tablet    torsemide (DEMADEX) 10 MG tablet     No " current facility-administered medications for this visit.       Past Medical History  Past Medical History:   Diagnosis Date    Anemia     Anemia of chronic disease 5/14/2020    Back pain     CKD (chronic kidney disease) stage 3, GFR 30-59 ml/min (H)     CRF (chronic renal failure), stage 3  5/14/2020    Fall 11/2019    suffered multiple left rib fractures, C3 and T2 fractures    Mixed hyperlipidemia 7/7/2004    Paroxysmal atrial fibrillation (H)     Personal history of colonic polyps 1972    gets colonoscopy every five years, due in 2006    Pleural effusion on left 11/2019    after multiple rib fractures    Pulmonary hypertension (H) 5/10/2020    Added automatically from request for surgery [id number]    Recurrent Left Pleural effusion -- S/P Pleurex Cath 5/12/20 12/30/2019    Rosacea 1989    Type II or unspecified type diabetes mellitus without mention of complication, not stated as uncontrolled 1999    Unspecified essential hypertension 1994       Social History  Social History     Tobacco Use    Smoking status: Former     Packs/day: 0.20     Years: 40.00     Additional pack years: 0.00     Total pack years: 8.00     Types: Cigarettes     Start date: 1968     Quit date: 1/1/2008     Years since quitting: 15.8    Smokeless tobacco: Never   Vaping Use    Vaping Use: Never used   Substance Use Topics    Alcohol use: Not Currently     Alcohol/week: 35.0 standard drinks of alcohol    Drug use: Not Currently       Physical Exam        12/13/2021     7:57 PM 2/27/2023     8:48 AM 5/25/2023    11:34 AM   Vitals - Patient Reported   Weight (Patient Reported)  145 lb 155 lb   Systolic (Patient Reported)  178 119   Diastolic (Patient Reported)  97 60   My weight today is: 163 lbs                 Neurologic Exam:  Phone-only visit. Speech was clear and fluent.     Neuroimaging: as per HPI. I personally reviewed those images.    Labs:    Coagulation studies:  Recent Labs   Lab Test 01/15/23  1321 11/21/20  1146 07/17/20  0950    INR 1.13 1.50* 1.18*        Lipid panel:  Recent Labs   Lab Test 01/17/23  0436 05/13/20  0553   CHOL 112 116   HDL 50 60   LDL 52 43   TRIG 51 65       HbA1C:  Recent Labs   Lab Test 04/20/23  1804 01/17/23  0436 11/20/20  2233   A1C 8.0* 7.2* 7.7*       Troponin:  Recent Labs   Lab Test 03/17/21  0752 07/08/20  1223 05/21/20  1302   TROPI <0.015 <0.015 0.034       Billing:    I spent a total of 20 minutes on the day of the visit.   Time spent by me doing chart review, history and exam, documentation and further activities per the note

## 2023-11-16 NOTE — PATIENT INSTRUCTIONS
- continue apixaban 2.5 mg twice daily for afib and secondary stroke prevention (age > 80 and creatinine >1.5)  - LDL 52; not on statin therapy   - Blood pressure goal < 130/80  - close PCP follow up for monitoring of BP and blood glucose  - follow up in stroke clinic as needed

## 2023-11-16 NOTE — LETTER
"    11/16/2023         RE: Tc Garcia  [id number] [facility] [id number]        Dear Colleague,    Thank you for referring your patient, Tc Garcia, to the [facility] NEUROLOGY [facility]. Please see a copy of my visit note below.    Virtual Visit Details    Type of service:  Telephone Visit   Phone call duration: 10 minutes     _____________________________________________________________    [facility]         [phone]  _____________________________________________________________      Chief Complaint: Patient presents with:  Stroke      History of Present Illness: Tc Garcia is a 83 year old male presenting for follow-up for multifocal stroke and IVH.     He was hospitalized at [facility] on 1/15-01/27/23 . Prior to the hospital stay, he had a past medical history of atrial fibrillation on apixaban, HTN, HLD, DM, CKD.     He presented to the hospital with intermittent amnestic episodes and encephalopathy for days. BP in the /120. Head CT showed R lateral ventricle IVH, CTA showed no abnormality. Hemorrhage felt to be related to hypertension. Given K centra for reversal of coagulopathy. MRI showed scattered areas of acute/subacute appearing infarcts within both cerebral hemispheres and the right basal ganglia which were felt to be cardioembolic due to atrial fibrillation and missed apixaban doses.      Anticoagulation continued to be held at the time of discharge due to hemorrhage. He was hospitalized 1/29-1/31/2023 for hyperglycemia, hypotension and ABBIE. Seen by our service in the ED 2/11/2023 with \"seizure like activity\" - per chart review noted to have some stiffening but no extremity shaking and decreased LOC with blood glucose at the time noted to be 33. Head CT showed resolving hemorrhage.      He was discharged to TCU and then home. He is able to perform ADLs/IADLs including driving without any concerns. He denies any " residual deficits from his stroke.      He was hospitalized in April 2023 with DKA. Stroke service was consulted for questions regarding anticoagulation. He was under consideration for/interested in ASPIRE clinical trial; cardiologist ultimately recommended against it. CT and MRI were obtained during admission with interval improvement in hemorrhage burden and no evidence of underlying lesion. He was restarted on apixaban 5 mg twice daily.      He is following closely with cardiology clinic for medication and symptom management; they have been periodically adjusting medications. He is hoping his kidney function improves so he can start entresto.     He had an ED visit in mid-October for hyperglycemia and again late-October after a fall, luckily he did not sustain any injuries.     Stroke Evaluation summarized:  MRI 4/25: no acute pathology, chronic hemorrhage products paralleling the lateral aspect of the right lateral ventricle likely related to the previous hemorrhage arising from the right temporal lobe; No recent hemorrhage; No evidence for enhancing lesion in the right temporal lobe; Generalized brain atrophy and presumed microvascular ischemic changes     MRI 1/15: Right parietal IPH w/ IVH into right lateral ventricle w/ small amount of left lateral ventricle hemorrhage; moderate edema surrounding hemorrhage; scattered acute/subacute infarcts in cerebral hemispheres and right basal ganglia; moderate diffuse volume loss and white matter changes; several probably chronic cerebellar microhemorrhages; right cerebral convexity meningioma      Head CT 1/15:  Large isolated intraventricular hemorrhage of the right lateral ventricle; Minimal interval increase in size of the occipital horn of the right lateral ventricle. Ventricular size and morphology is otherwise no change; Probable moderate sequela of chronic small vessel ischemic disease.    Head vessels: focal moderate bilateral P1 stenoses   Neck vessels: no LVO  "or significant stenosis     Echo 4/21: EF 60-65%, atrial fibrillation, severely dilated atria, no evidence of atrial shunt   EKG/Tele: atrial fibrillation   LDL: 52  A1c: 8.0    Modified Magoffin Scale  Score: 0-No symptoms    Impression:   Problem List Items Addressed This Visit          Nervous and Auditory    Right-sided nontraumatic intraventricular intracerebral hemorrhage (H) - Primary      82 y/o man with history of primary spontaneous right IVH in the setting of apixaban use and hypertension, multifocal bihemispheric ischemic strokes d/t afib, several 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  He will call us with any questions.  For any acute neurologic deficits he was advised to  go directly to the hospital rather than call the clinic.    Ame Carvajal NP  Neurology  11/16/2023 3:28 PM  To page me or covering stroke neurology team member, click here: [facility]  Choose \"On Call\" tab at top, then search dropdown box for \"Neurology Adult\" & press Enter, look for Neuro ICU/Stroke    ___________________________________________________________________    Current Medications  Current Outpatient Medications   Medication     apixaban ANTICOAGULANT (ELIQUIS) 2.5 MG tablet     carvedilol (COREG) 12.5 MG tablet     Continuous Blood Gluc  (DEXCOM G6 ) JOSE     HUMALOG 100 UNIT/ML injection     HYDROcodone-acetaminophen (NORCO) 5-325 MG tablet     INSULIN PUMP - OUTPATIENT     irbesartan (AVAPRO) 300 MG tablet     potassium chloride ER (K-TAB/KLOR-CON) 10 MEQ CR tablet     tamsulosin (FLOMAX) 0.4 MG capsule     traMADol (ULTRAM-ER) " 100 MG 24 hr tablet     acetaminophen (TYLENOL) 325 MG tablet     doxycycline hyclate (VIBRAMYCIN) 50 MG capsule     finasteride (PROSCAR) 5 MG tablet     glucagon 1 MG kit     sacubitril-valsartan (ENTRESTO) 49-51 MG per tablet     torsemide (DEMADEX) 10 MG tablet     No current facility-administered medications for this visit.       Past Medical History  Past Medical History:   Diagnosis Date     Anemia      Anemia of chronic disease 5/14/2020     Back pain      CKD (chronic kidney disease) stage 3, GFR 30-59 ml/min (H)      CRF (chronic renal failure), stage 3  5/14/2020     Fall 11/2019    suffered multiple left rib fractures, C3 and T2 fractures     Mixed hyperlipidemia 7/7/2004     Paroxysmal atrial fibrillation (H)      Personal history of colonic polyps 1972    gets colonoscopy every five years, due in 2006     Pleural effusion on left 11/2019    after multiple rib fractures     Pulmonary hypertension (H) 5/10/2020    Added automatically from request for surgery [id number]     Recurrent Left Pleural effusion -- S/P Pleurex Cath 5/12/20 12/30/2019     Rosacea 1989     Type II or unspecified type diabetes mellitus without mention of complication, not stated as uncontrolled 1999     Unspecified essential hypertension 1994       Social History  Social History     Tobacco Use     Smoking status: Former     Packs/day: 0.20     Years: 40.00     Additional pack years: 0.00     Total pack years: 8.00     Types: Cigarettes     Start date: 1968     Quit date: 1/1/2008     Years since quitting: 15.8     Smokeless tobacco: Never   Vaping Use     Vaping Use: Never used   Substance Use Topics     Alcohol use: Not Currently     Alcohol/week: 35.0 standard drinks of alcohol     Drug use: Not Currently       Physical Exam        12/13/2021     7:57 PM 2/27/2023     8:48 AM 5/25/2023    11:34 AM   Vitals - Patient Reported   Weight (Patient Reported)  145 lb 155 lb   Systolic (Patient Reported)  178 119   Diastolic (Patient  Reported)  97 60   My weight today is: 163 lbs                 Neurologic Exam:  Phone-only visit. Speech was clear and fluent.     Neuroimaging: as per HPI. I personally reviewed those images.    Labs:    Coagulation studies:  Recent Labs   Lab Test 01/15/23  1321 11/21/20  1146 07/17/20  0950   INR 1.13 1.50* 1.18*        Lipid panel:  Recent Labs   Lab Test 01/17/23  0436 05/13/20  0553   CHOL 112 116   HDL 50 60   LDL 52 43   TRIG 51 65       HbA1C:  Recent Labs   Lab Test 04/20/23  1804 01/17/23  0436 11/20/20  2233   A1C 8.0* 7.2* 7.7*       Troponin:  Recent Labs   Lab Test 03/17/21  0752 07/08/20  1223 05/21/20  1302   TROPI <0.015 <0.015 0.034       Billing:    I spent a total of 20 minutes on the day of the visit.   Time spent by me doing chart review, history and exam, documentation and further activities per the note      Again, thank you for allowing me to participate in the care of your patient.        Sincerely,        Ame Carvajal NP

## 2023-11-16 NOTE — NURSING NOTE
Is the patient currently in the state of [place]? YES    Visit mode:TELEPHONE    If the visit is dropped, the patient can be reconnected by: TELEPHONE VISIT: Phone number: [phone]    Will anyone else be joining the visit? NO  (If patient encounters technical issues they should call [phone])    How would you like to obtain your AVS? MyChart    Are changes needed to the allergy or medication list? No    Reason for visit: Stroke    Pattie Ramos MA

## 2023-11-20 NOTE — PROGRESS NOTES
"Cardiology Clinic Progress Note  Tc Garcia MRN# [id number]   YOB: 1940 Age: 83 year old   Primary Cardiologist: Dr. Julien  Reason for visit: Cardiology follow up            Assessment and Plan:   Tc Garcia is a very pleasant 83 year old male here for urgent cardiology follow up.      1.  Chronic HFpEF - 55-60%, RV normal systolic function              - Torsemide 10mg PRN for weight gain on worsening HF symptoms  2. Tricuspid regurgitation - mild to moderate  3. Hypertension - controlled, slightly elevated in clinic, did not take medications yet this morning, reports controlled at home.   4. CKD - with recent acute worsening, lab results pending  5. Chronic atrial fibrillation - on eliquis for thromboembolic prophylaxis, carvedilol 12.5mg BID  6. DM  7. Anemia     I saw patient today for cardiology follow up, since our last OV his diuretic has been stopped all together due to acute on chronic kidney injury. He underwent GI evaluation for \"constant\" diarrhea 11/16 and states he was told to report back to them if any \"certain symptoms\". No further evaluation warranted. He has had 2 ED visits since our last OV. 10/26 presented with vomiting and fall, noted to have ABBIE, creatinine 2.43, no repeat labs since.    Echocardiogram 10/24/23 showed LVEF 55-60%, flattened septum consistent with RV pressure/volume overload. RV systolic function normal. Mid to moderate tricuspid regurgitation. IVC dilated.     Labs prior to OV today pending. At this time recommend no medication changes. Recommend urgent PCP visit given continued unintentional weight loss and urgent nephrology follow up due to acute on chronic kidney injury.     Changes today: none    Follow up plan:     Urgent nephrology visit    Urgent PCP visit     Labs pending still today will determine if any changes needed after labs reviewed.    Cardiology follow up in 2 months with labs prior.         History of Presenting Illness:    Tc Garcia is " "a very pleasant 83 year old male with a history of  HFpEF, chronic atrial fibrillation on eliquis, hypertension, HLD, tricuspid regurgitation, CKD, anemia and DM.        Winter 2023 he had multiple hospitalizations, January for nontraumatic intraventricular intracerebral hemorrhage in the setting of hypertension and anticoagulation with apixaban. Apixaban was on hold and has since been resumed with no difficulty. February presented to ED with \"seizure like activity\". April with DKA.      Echo April 2023 showed LVEF 60-65%, RV moderately dilated with normal RV systolic function, moderate to severe tricuspid regurgitation, mild aortic stenosis and mildly dilated ascending aorta.      He last saw Dr. Julien in July 2023 at which point he was doing well, blood pressures were elevated irbesartan increased to 300mg daily.     I met patient for urgent cardiology follow up in September for worsening progressive dyspnea. Weight had been trending down, despite this he noted significant progressive dyspnea. He didn't appear overtly volume overloaded on exam. Furosemide had been increased to 40mg with no significant improvement in symptoms. Recommended changing his diuretic regimen to torsemide (equivocal dose) to see if we get better results. Additionally recommended getting further evaluation with hemoglobin and echo.       Repeat BMP showed decline in renal function, creatinine 1.75, torsemide decreased to 10mg daily. Advised not to start Entresto given renal function, then due to further decline in renal function torsemide changed to PRN. Management of his HF has been complicated by diarrhea, which is likely contributing to his decline in renal function. Underwent GI evaluation for \"constant\" diarrhea 11/16 and states he was told to report back to them if any \"certain symptoms\". No further evaluation warranted.    He has had 2 ED visits since our last OV. 10/26 presented with vomiting and fall, noted to have ABBIE, creatinine " 2.43, no repeat labs since.    Echocardiogram 10/24/23 showed LVEF 55-60%, flattened septum consistent with RV pressure/volume overload. RV systolic function normal. Mid to moderate tricuspid regurgitation. IVC dilated.     Patient is here today for cardiology follow up.     Patient reports feeling good. Monitoring weights daily a home, continues to lose weight, notes he is not eating much. Reports no appetite. Has not taken any torsemide in 2 weeks. Denies LE edema. Denies abdominal distention. Continues to feel short of breath with any activity. Denies shortness of breath at rest or orthopnea/PND. Denies chest pain or chest tightness. Denies dizziness, lightheadedness or other presyncopal symptoms. Denies tachycardia or palpitations. Reports improvement in diarrhea, states having a loose stool once every other day.     Labs today pending. Blood pressure 142/82 and HR 82 in clinic today. Has not taken medications yet this morning. Checking BP at home which he reports 120s/80s.     Appetite has been okay, notes has been less. Recalls eating waffles/cereal/soup. Adding additional salt to foods. No set exercise routine. Rare alcohol use. Denies tobacco use.          Social History    Living at home with his wife, 2 dogs and a cat.    Social History     Socioeconomic History    Marital status:      Spouse name: Lianna    Number of children: 4    Years of education: 16    Highest education level: Not on file   Occupational History    Occupation: [employer]     Employer: [employer]    Tobacco Use    Smoking status: Former     Packs/day: 0.20     Years: 40.00     Additional pack years: 0.00     Total pack years: 8.00     Types: Cigarettes     Start date: 1968     Quit date: 1/1/2008     Years since quitting: 15.8    Smokeless tobacco: Never   Vaping Use    Vaping Use: Never used   Substance and Sexual Activity    Alcohol use: Not Currently     Alcohol/week: 35.0 standard drinks of alcohol    Drug use:  "Not Currently    Sexual activity: Not on file   Other Topics Concern    Parent/sibling w/ CABG, MI or angioplasty before 65F 55M? Not Asked   Social History Narrative    Not on file     Social Determinants of Health     Financial Resource Strain: Low Risk  (10/16/2023)    Financial Resource Strain     Within the past 12 months, have you or your family members you live with been unable to get utilities (heat, electricity) when it was really needed?: No   Food Insecurity: Low Risk  (10/16/2023)    Food Insecurity     Within the past 12 months, did you worry that your food would run out before you got money to buy more?: No     Within the past 12 months, did the food you bought just not last and you didn t have money to get more?: No   Transportation Needs: Low Risk  (10/16/2023)    Transportation Needs     Within the past 12 months, has lack of transportation kept you from medical appointments, getting your medicines, non-medical meetings or appointments, work, or from getting things that you need?: No   Physical Activity: Not on file   Stress: Not on file   Social Connections: Not on file   Interpersonal Safety: Not on file   Housing Stability: Low Risk  (10/16/2023)    Housing Stability     Do you have housing? : Yes     Are you worried about losing your housing?: No          Review of Systems:   10 point ROS neg other than the symptoms noted above in the HPI.          Physical Exam:   Vitals: Ht 1.753 m (5' 9\")   BMI 19.79 kg/m     Wt Readings from Last 4 Encounters:   11/16/23 60.8 kg (134 lb)   10/26/23 63.5 kg (140 lb)   10/14/23 60.8 kg (134 lb)   09/25/23 63.3 kg (139 lb 9.6 oz)     GEN: frail, in no acute distress.  HEENT:  Pupils equal, round. Sclerae nonicteric.   NECK: Supple, no masses appreciated.   C/V:  Irregularly irregular rate and rhythm, no murmur, rub or gallop.    RESP: Respirations are unlabored. Clear to auscultation bilaterally without wheezing, rales, or rhonchi.  GI: Abdomen soft, " nontender.  EXTREM: No LE edema.  NEURO: Alert and oriented, cooperative.  SKIN: Warm and dry.        Data:     LIPID RESULTS:  Lab Results   Component Value Date    CHOL 112 01/17/2023    CHOL 116 05/13/2020    HDL 50 01/17/2023    HDL 60 05/13/2020    LDL 52 01/17/2023    LDL 43 05/13/2020    TRIG 51 01/17/2023    TRIG 65 05/13/2020    CHOLHDLRATIO 2.7 03/09/2009     LIVER ENZYME RESULTS:  Lab Results   Component Value Date    AST 16 10/26/2023    AST 12 03/22/2021    ALT 10 10/26/2023    ALT 5 03/22/2021     CBC RESULTS:  Lab Results   Component Value Date    WBC 13.0 (H) 10/26/2023    WBC 10.4 06/15/2021    RBC 4.09 (L) 10/26/2023    RBC 4.08 (A) 06/15/2021    HGB 11.0 (L) 10/26/2023    HGB 11.7 (A) 06/15/2021    HCT 34.3 (L) 10/26/2023    HCT 36.3 (A) 06/15/2021    MCV 84 10/26/2023    MCV 89 06/15/2021    MCH 26.9 10/26/2023    MCH 28.7 06/15/2021    MCHC 32.1 10/26/2023    MCHC 32.2 06/15/2021    RDW 14.7 10/26/2023    RDW 14.2 06/15/2021     10/26/2023     06/15/2021     BMP RESULTS:  Lab Results   Component Value Date     (L) 10/26/2023     07/12/2021    POTASSIUM 4.2 10/26/2023    POTASSIUM 4.2 01/17/2023    POTASSIUM 4.0 07/12/2021    CHLORIDE 95 (L) 10/26/2023    CHLORIDE 105 01/17/2023    CHLORIDE 102 11/04/2021    CHLORIDE 100 07/12/2021    CO2 24 10/26/2023    CO2 25 01/17/2023    CO2 26 07/12/2021    ANIONGAP 13 10/26/2023    ANIONGAP 12 01/17/2023    ANIONGAP 4 03/18/2021     (H) 10/26/2023     (H) 10/14/2023     (H) 01/17/2023     (A) 07/12/2021    BUN 59.3 (H) 10/26/2023    BUN 36 (H) 01/17/2023    BUN 18 07/12/2021    BUN 10 07/12/2021    CR 2.43 (H) 10/26/2023    CR 1.73 (A) 07/12/2021    GFRESTIMATED 26 (L) 10/26/2023    GFRESTIMATED 40 (L) 06/21/2021    GFRESTBLACK 46 (L) 06/21/2021    LLOYD 9.5 10/26/2023    LLOYD 9.3 07/12/2021      A1C RESULTS:  Lab Results   Component Value Date    A1C 8.0 (H) 04/20/2023    A1C 7.7 (H) 11/20/2020     INR  RESULTS:  Lab Results   Component Value Date    INR 1.13 01/15/2023    INR 1.50 (H) 11/21/2020    INR 1.18 (H) 07/17/2020          Medications     Current Outpatient Medications   Medication Sig Dispense Refill    acetaminophen (TYLENOL) 325 MG tablet Take 2 tablets (650 mg) by mouth every 6 hours as needed for mild pain or fever (Patient not taking: Reported on 11/16/2023)  0    apixaban ANTICOAGULANT (ELIQUIS) 2.5 MG tablet Take 1 tablet (2.5 mg) by mouth 2 times daily 180 tablet 3    carvedilol (COREG) 12.5 MG tablet Take 1 tablet (12.5 mg) by mouth 2 times daily (with meals) 180 tablet 3    Continuous Blood Gluc  (DEXCOM G6 ) JOSE Dexcom G6    USE EACH WITH 10 DAYS SENSORS      doxycycline hyclate (VIBRAMYCIN) 50 MG capsule TAKE 1 CAPSULE BY MOUTH TWICE DAILY WITH FOOD (Patient not taking: Reported on 11/16/2023)      finasteride (PROSCAR) 5 MG tablet Take 1 tablet (5 mg) by mouth daily (Patient not taking: Reported on 11/16/2023)  0    glucagon 1 MG kit 1 mg as needed for low blood sugar (Patient not taking: Reported on 11/16/2023)      HUMALOG 100 UNIT/ML injection For refilling insulin pump      HYDROcodone-acetaminophen (NORCO) 5-325 MG tablet TAKE 1/2-1 TABLET BY MOUTH AS NEEDED FOR PAIN. MAX OF 1 TABLET PER DAY      INSULIN PUMP - OUTPATIENT Medtronic device with no CGM and site change every 3 days   Sensitivity factor (midnight to midnight): 55  Bolus (units:gram): 7748-2390 = 1:9, 1385-4089 = 1:7, 2710-8282 = 1:7, 6748-3319 = 1:9, 3121-5246 = 1:13  Basal (units/hour): 0066-6759 = 0.45, 4251-8241 = 0.5, 2303-8445 = 0.625, 0923-6918 = 0.6, 1555-3069 = 0.45      irbesartan (AVAPRO) 300 MG tablet Take 1 tablet (300 mg) by mouth At Bedtime 90 tablet 3    potassium chloride ER (K-TAB/KLOR-CON) 10 MEQ CR tablet Take 1 tablet (10 mEq) by mouth daily 90 tablet 3    sacubitril-valsartan (ENTRESTO) 49-51 MG per tablet Take 1 tablet by mouth 2 times daily (Patient not taking: Reported on  11/16/2023) 180 tablet 3    tamsulosin (FLOMAX) 0.4 MG capsule Take 0.4 mg by mouth every morning      torsemide (DEMADEX) 10 MG tablet Take 1 tablet (10 mg) by mouth as needed (For weight gain greater than 3 lbs overnight or increased swelling and/or shortness of breath) (Patient not taking: Reported on 11/16/2023) 90 tablet 3    traMADol (ULTRAM-ER) 100 MG 24 hr tablet Take 1 tablet by mouth daily          Past Medical History     Past Medical History:   Diagnosis Date    Anemia     Anemia of chronic disease 5/14/2020    Back pain     CKD (chronic kidney disease) stage 3, GFR 30-59 ml/min (H)     CRF (chronic renal failure), stage 3  5/14/2020    Fall 11/2019    suffered multiple left rib fractures, C3 and T2 fractures    Mixed hyperlipidemia 7/7/2004    Paroxysmal atrial fibrillation (H)     Personal history of colonic polyps 1972    gets colonoscopy every five years, due in 2006    Pleural effusion on left 11/2019    after multiple rib fractures    Pulmonary hypertension (H) 5/10/2020    Added automatically from request for surgery [id number]    Recurrent Left Pleural effusion -- S/P Pleurex Cath 5/12/20 12/30/2019    Rosacea 1989    Type II or unspecified type diabetes mellitus without mention of complication, not stated as uncontrolled 1999    Unspecified essential hypertension 1994     Past Surgical History:   Procedure Laterality Date    ANESTHESIA CARDIOVERSION N/A 2/3/2020    Procedure: ANESTHESIA, FOR CARDIOVERSION;  Surgeon: GENERIC ANESTHESIA PROVIDER;  Location:  OR     RIGHT HEART CATH MEASUREMENTS RECORDED N/A 5/13/2020    Procedure: Right Heart Cath;  Surgeon: Senthil Silva MD;  Location:  HEART CARDIAC CATH LAB    HC REMOVE TONSILS/ADENOIDS,<11 Y/O      T & A <11y.o.    IR CHEST TUBE DRAIN TUNNELED LEFT  5/12/2020    IR CHEST TUBE PLACEMENT NON-TUNNELLED LEFT  7/11/2020    IR CHEST TUBE REMOVAL NON TUNNELED LEFT  7/17/2020    IR CHEST TUBE REMOVAL TUNNELED LEFT  7/11/2020     LAPAROSCOPIC CHOLECYSTECTOMY N/A 2020    Procedure: CHOLECYSTECTOMY, LAPAROSCOPIC;  Surgeon: Annette Lambert MD;  Location:  OR    LAPAROSCOPIC HERNIORRHAPHY INGUINAL BILATERAL Bilateral 10/27/2020    Procedure: LAPAROSCOPIC BILATERAL INGUINAL HERNIA REPAIR WITH MESH;  Surgeon: Brian Garsia MD;  Location:  OR     Family History   Problem Relation Age of Onset    Family History Negative Mother          age 71    Family History Negative Father          age 70    Diabetes Brother         alive age 77    Diabetes Brother         alive age 76    Family History Negative Brother             Family History Negative Brother             Diabetes Sister         alive age74    Family History Negative Sister          age 86    Heart Disease Sister             Family History Negative Sister             Family History Negative Sister             Diabetes Sister     Diabetes Sister     Diabetes Brother     Diabetes Brother           Allergies   No known drug allergy    40 minutes spent on the date of the encounter doing chart review, history and exam, documentation and further activities as noted above      [facility]  Pager: [phone]

## 2023-11-20 NOTE — LETTER
"11/20/2023    Jessika Ratliff Adalid  [address]  [facility] [address]    RE: Tc Garcia       Dear Colleague,     I had the pleasure of seeing Tc Garcia in the [facility].  Cardiology Clinic Progress Note  Tc Garcia MRN# [id number]   YOB: 1940 Age: 83 year old   Primary Cardiologist: Dr. Julien  Reason for visit: Cardiology follow up            Assessment and Plan:   Tc Garcia is a very pleasant 83 year old male here for urgent cardiology follow up.      1.  Chronic HFpEF - 55-60%, RV normal systolic function              - Torsemide 10mg PRN for weight gain on worsening HF symptoms  2. Tricuspid regurgitation - mild to moderate  3. Hypertension - controlled, slightly elevated in clinic, did not take medications yet this morning, reports controlled at home.   4. CKD - with recent acute worsening, lab results pending  5. Chronic atrial fibrillation - on eliquis for thromboembolic prophylaxis, carvedilol 12.5mg BID  6. DM  7. Anemia     I saw patient today for cardiology follow up, since our last OV his diuretic has been stopped all together due to acute on chronic kidney injury. He underwent GI evaluation for \"constant\" diarrhea 11/16 and states he was told to report back to them if any \"certain symptoms\". No further evaluation warranted. He has had 2 ED visits since our last OV. 10/26 presented with vomiting and fall, noted to have ABBIE, creatinine 2.43, no repeat labs since.    Echocardiogram 10/24/23 showed LVEF 55-60%, flattened septum consistent with RV pressure/volume overload. RV systolic function normal. Mid to moderate tricuspid regurgitation. IVC dilated.     Labs prior to OV today pending. At this time recommend no medication changes. Recommend urgent PCP visit given continued unintentional weight loss and urgent nephrology follow up due to acute on chronic kidney injury.     Changes today: none    Follow up plan:     Urgent nephrology " "visit    Urgent PCP visit     Labs pending still today will determine if any changes needed after labs reviewed.    Cardiology follow up in 2 months with labs prior.         History of Presenting Illness:    Tc Garcia is a very pleasant 83 year old male with a history of  HFpEF, chronic atrial fibrillation on eliquis, hypertension, HLD, tricuspid regurgitation, CKD, anemia and DM.        Winter 2023 he had multiple hospitalizations, January for nontraumatic intraventricular intracerebral hemorrhage in the setting of hypertension and anticoagulation with apixaban. Apixaban was on hold and has since been resumed with no difficulty. February presented to ED with \"seizure like activity\". April with DKA.      Echo April 2023 showed LVEF 60-65%, RV moderately dilated with normal RV systolic function, moderate to severe tricuspid regurgitation, mild aortic stenosis and mildly dilated ascending aorta.      He last saw Dr. Julien in July 2023 at which point he was doing well, blood pressures were elevated irbesartan increased to 300mg daily.     I met patient for urgent cardiology follow up in September for worsening progressive dyspnea. Weight had been trending down, despite this he noted significant progressive dyspnea. He didn't appear overtly volume overloaded on exam. Furosemide had been increased to 40mg with no significant improvement in symptoms. Recommended changing his diuretic regimen to torsemide (equivocal dose) to see if we get better results. Additionally recommended getting further evaluation with hemoglobin and echo.       Repeat BMP showed decline in renal function, creatinine 1.75, torsemide decreased to 10mg daily. Advised not to start Entresto given renal function, then due to further decline in renal function torsemide changed to PRN. Management of his HF has been complicated by diarrhea, which is likely contributing to his decline in renal function. Underwent GI evaluation for \"constant\" diarrhea " "11/16 and states he was told to report back to them if any \"certain symptoms\". No further evaluation warranted.    He has had 2 ED visits since our last OV. 10/26 presented with vomiting and fall, noted to have ABBIE, creatinine 2.43, no repeat labs since.    Echocardiogram 10/24/23 showed LVEF 55-60%, flattened septum consistent with RV pressure/volume overload. RV systolic function normal. Mid to moderate tricuspid regurgitation. IVC dilated.     Patient is here today for cardiology follow up.     Patient reports feeling good. Monitoring weights daily a home, continues to lose weight, notes he is not eating much. Reports no appetite. Has not taken any torsemide in 2 weeks. Denies LE edema. Denies abdominal distention. Continues to feel short of breath with any activity. Denies shortness of breath at rest or orthopnea/PND. Denies chest pain or chest tightness. Denies dizziness, lightheadedness or other presyncopal symptoms. Denies tachycardia or palpitations. Reports improvement in diarrhea, states having a loose stool once every other day.     Labs today pending. Blood pressure 142/82 and HR 82 in clinic today. Has not taken medications yet this morning. Checking BP at home which he reports 120s/80s.     Appetite has been okay, notes has been less. Recalls eating waffles/cereal/soup. Adding additional salt to foods. No set exercise routine. Rare alcohol use. Denies tobacco use.          Social History    Living at home with his wife, 2 dogs and a cat.    Social History     Socioeconomic History    Marital status:      Spouse name: Lianna    Number of children: 4    Years of education: 16    Highest education level: Not on file   Occupational History    Occupation: [employer]     Employer: [employer]    Tobacco Use    Smoking status: Former     Packs/day: 0.20     Years: 40.00     Additional pack years: 0.00     Total pack years: 8.00     Types: Cigarettes     Start date: 1968     Quit date: " "1/1/2008     Years since quitting: 15.8    Smokeless tobacco: Never   Vaping Use    Vaping Use: Never used   Substance and Sexual Activity    Alcohol use: Not Currently     Alcohol/week: 35.0 standard drinks of alcohol    Drug use: Not Currently    Sexual activity: Not on file   Other Topics Concern    Parent/sibling w/ CABG, MI or angioplasty before 65F 55M? Not Asked   Social History Narrative    Not on file     Social Determinants of Health     Financial Resource Strain: Low Risk  (10/16/2023)    Financial Resource Strain     Within the past 12 months, have you or your family members you live with been unable to get utilities (heat, electricity) when it was really needed?: No   Food Insecurity: Low Risk  (10/16/2023)    Food Insecurity     Within the past 12 months, did you worry that your food would run out before you got money to buy more?: No     Within the past 12 months, did the food you bought just not last and you didn t have money to get more?: No   Transportation Needs: Low Risk  (10/16/2023)    Transportation Needs     Within the past 12 months, has lack of transportation kept you from medical appointments, getting your medicines, non-medical meetings or appointments, work, or from getting things that you need?: No   Physical Activity: Not on file   Stress: Not on file   Social Connections: Not on file   Interpersonal Safety: Not on file   Housing Stability: Low Risk  (10/16/2023)    Housing Stability     Do you have housing? : Yes     Are you worried about losing your housing?: No          Review of Systems:   10 point ROS neg other than the symptoms noted above in the HPI.          Physical Exam:   Vitals: Ht 1.753 m (5' 9\")   BMI 19.79 kg/m     Wt Readings from Last 4 Encounters:   11/16/23 60.8 kg (134 lb)   10/26/23 63.5 kg (140 lb)   10/14/23 60.8 kg (134 lb)   09/25/23 63.3 kg (139 lb 9.6 oz)     GEN: frail, in no acute distress.  HEENT:  Pupils equal, round. Sclerae nonicteric.   NECK: Supple, " no masses appreciated.   C/V:  Irregularly irregular rate and rhythm, no murmur, rub or gallop.    RESP: Respirations are unlabored. Clear to auscultation bilaterally without wheezing, rales, or rhonchi.  GI: Abdomen soft, nontender.  EXTREM: No LE edema.  NEURO: Alert and oriented, cooperative.  SKIN: Warm and dry.        Data:     LIPID RESULTS:  Lab Results   Component Value Date    CHOL 112 01/17/2023    CHOL 116 05/13/2020    HDL 50 01/17/2023    HDL 60 05/13/2020    LDL 52 01/17/2023    LDL 43 05/13/2020    TRIG 51 01/17/2023    TRIG 65 05/13/2020    CHOLHDLRATIO 2.7 03/09/2009     LIVER ENZYME RESULTS:  Lab Results   Component Value Date    AST 16 10/26/2023    AST 12 03/22/2021    ALT 10 10/26/2023    ALT 5 03/22/2021     CBC RESULTS:  Lab Results   Component Value Date    WBC 13.0 (H) 10/26/2023    WBC 10.4 06/15/2021    RBC 4.09 (L) 10/26/2023    RBC 4.08 (A) 06/15/2021    HGB 11.0 (L) 10/26/2023    HGB 11.7 (A) 06/15/2021    HCT 34.3 (L) 10/26/2023    HCT 36.3 (A) 06/15/2021    MCV 84 10/26/2023    MCV 89 06/15/2021    MCH 26.9 10/26/2023    MCH 28.7 06/15/2021    MCHC 32.1 10/26/2023    MCHC 32.2 06/15/2021    RDW 14.7 10/26/2023    RDW 14.2 06/15/2021     10/26/2023     06/15/2021     BMP RESULTS:  Lab Results   Component Value Date     (L) 10/26/2023     07/12/2021    POTASSIUM 4.2 10/26/2023    POTASSIUM 4.2 01/17/2023    POTASSIUM 4.0 07/12/2021    CHLORIDE 95 (L) 10/26/2023    CHLORIDE 105 01/17/2023    CHLORIDE 102 11/04/2021    CHLORIDE 100 07/12/2021    CO2 24 10/26/2023    CO2 25 01/17/2023    CO2 26 07/12/2021    ANIONGAP 13 10/26/2023    ANIONGAP 12 01/17/2023    ANIONGAP 4 03/18/2021     (H) 10/26/2023     (H) 10/14/2023     (H) 01/17/2023     (A) 07/12/2021    BUN 59.3 (H) 10/26/2023    BUN 36 (H) 01/17/2023    BUN 18 07/12/2021    BUN 10 07/12/2021    CR 2.43 (H) 10/26/2023    CR 1.73 (A) 07/12/2021    GFRESTIMATED 26 (L) 10/26/2023     GFRESTIMATED 40 (L) 06/21/2021    GFRESTBLACK 46 (L) 06/21/2021    LLOYD 9.5 10/26/2023    LLOYD 9.3 07/12/2021      A1C RESULTS:  Lab Results   Component Value Date    A1C 8.0 (H) 04/20/2023    A1C 7.7 (H) 11/20/2020     INR RESULTS:  Lab Results   Component Value Date    INR 1.13 01/15/2023    INR 1.50 (H) 11/21/2020    INR 1.18 (H) 07/17/2020          Medications     Current Outpatient Medications   Medication Sig Dispense Refill    acetaminophen (TYLENOL) 325 MG tablet Take 2 tablets (650 mg) by mouth every 6 hours as needed for mild pain or fever (Patient not taking: Reported on 11/16/2023)  0    apixaban ANTICOAGULANT (ELIQUIS) 2.5 MG tablet Take 1 tablet (2.5 mg) by mouth 2 times daily 180 tablet 3    carvedilol (COREG) 12.5 MG tablet Take 1 tablet (12.5 mg) by mouth 2 times daily (with meals) 180 tablet 3    Continuous Blood Gluc  (DEXCOM G6 ) JOSE Dexcom G6    USE EACH WITH 10 DAYS SENSORS      doxycycline hyclate (VIBRAMYCIN) 50 MG capsule TAKE 1 CAPSULE BY MOUTH TWICE DAILY WITH FOOD (Patient not taking: Reported on 11/16/2023)      finasteride (PROSCAR) 5 MG tablet Take 1 tablet (5 mg) by mouth daily (Patient not taking: Reported on 11/16/2023)  0    glucagon 1 MG kit 1 mg as needed for low blood sugar (Patient not taking: Reported on 11/16/2023)      HUMALOG 100 UNIT/ML injection For refilling insulin pump      HYDROcodone-acetaminophen (NORCO) 5-325 MG tablet TAKE 1/2-1 TABLET BY MOUTH AS NEEDED FOR PAIN. MAX OF 1 TABLET PER DAY      INSULIN PUMP - OUTPATIENT Medtronic device with no CGM and site change every 3 days   Sensitivity factor (midnight to midnight): 55  Bolus (units:gram): 5084-0243 = 1:9, 8727-8580 = 1:7, 2587-9457 = 1:7, 4020-2700 = 1:9, 0721-1320 = 1:13  Basal (units/hour): 8987-7243 = 0.45, 5836-9873 = 0.5, 9344-3346 = 0.625, 3879-8289 = 0.6, 1425-8609 = 0.45      irbesartan (AVAPRO) 300 MG tablet Take 1 tablet (300 mg) by mouth At Bedtime 90 tablet 3    potassium  chloride ER (K-TAB/KLOR-CON) 10 MEQ CR tablet Take 1 tablet (10 mEq) by mouth daily 90 tablet 3    sacubitril-valsartan (ENTRESTO) 49-51 MG per tablet Take 1 tablet by mouth 2 times daily (Patient not taking: Reported on 11/16/2023) 180 tablet 3    tamsulosin (FLOMAX) 0.4 MG capsule Take 0.4 mg by mouth every morning      torsemide (DEMADEX) 10 MG tablet Take 1 tablet (10 mg) by mouth as needed (For weight gain greater than 3 lbs overnight or increased swelling and/or shortness of breath) (Patient not taking: Reported on 11/16/2023) 90 tablet 3    traMADol (ULTRAM-ER) 100 MG 24 hr tablet Take 1 tablet by mouth daily          Past Medical History     Past Medical History:   Diagnosis Date    Anemia     Anemia of chronic disease 5/14/2020    Back pain     CKD (chronic kidney disease) stage 3, GFR 30-59 ml/min (H)     CRF (chronic renal failure), stage 3  5/14/2020    Fall 11/2019    suffered multiple left rib fractures, C3 and T2 fractures    Mixed hyperlipidemia 7/7/2004    Paroxysmal atrial fibrillation (H)     Personal history of colonic polyps 1972    gets colonoscopy every five years, due in 2006    Pleural effusion on left 11/2019    after multiple rib fractures    Pulmonary hypertension (H) 5/10/2020    Added automatically from request for surgery [id number]    Recurrent Left Pleural effusion -- S/P Pleurex Cath 5/12/20 12/30/2019    Rosacea 1989    Type II or unspecified type diabetes mellitus without mention of complication, not stated as uncontrolled 1999    Unspecified essential hypertension 1994     Past Surgical History:   Procedure Laterality Date    ANESTHESIA CARDIOVERSION N/A 2/3/2020    Procedure: ANESTHESIA, FOR CARDIOVERSION;  Surgeon: GENERIC ANESTHESIA PROVIDER;  Location:  OR    CV RIGHT HEART CATH MEASUREMENTS RECORDED N/A 5/13/2020    Procedure: Right Heart Cath;  Surgeon: Senthil Silva MD;  Location:  HEART CARDIAC CATH LAB    HC REMOVE TONSILS/ADENOIDS,<13 Y/O      T & A <11y.o.     IR CHEST TUBE DRAIN TUNNELED LEFT  2020    IR CHEST TUBE PLACEMENT NON-TUNNELLED LEFT  2020    IR CHEST TUBE REMOVAL NON TUNNELED LEFT  2020    IR CHEST TUBE REMOVAL TUNNELED LEFT  2020    LAPAROSCOPIC CHOLECYSTECTOMY N/A 2020    Procedure: CHOLECYSTECTOMY, LAPAROSCOPIC;  Surgeon: Annette Lambert MD;  Location:  OR    LAPAROSCOPIC HERNIORRHAPHY INGUINAL BILATERAL Bilateral 10/27/2020    Procedure: LAPAROSCOPIC BILATERAL INGUINAL HERNIA REPAIR WITH MESH;  Surgeon: Brian Garsia MD;  Location:  OR     Family History   Problem Relation Age of Onset    Family History Negative Mother          age 71    Family History Negative Father          age 70    Diabetes Brother         alive age 77    Diabetes Brother         alive age 76    Family History Negative Brother             Family History Negative Brother             Diabetes Sister         alive age76    Family History Negative Sister          age 86    Heart Disease Sister             Family History Negative Sister             Family History Negative Sister             Diabetes Sister     Diabetes Sister     Diabetes Brother     Diabetes Brother           Allergies   No known drug allergy    40 minutes spent on the date of the encounter doing chart review, history and exam, documentation and further activities as noted above      OMAR Murrieta CNP  [facility]  Pager: [phone]    Thank you for allowing me to participate in the care of your patient.      Sincerely,     OMAR Murrieta CNP     [facility]  cc:   OMAR Lu CNP  [address] [place] [id number]

## 2023-11-20 NOTE — PATIENT INSTRUCTIONS
Call [facility] nurse for any questions or concerns:  [phone]   *If you have concerns after hours, please call [phone], option 2 to speak with on call Cardiologist.    1. Medication changes and/or recommendations from today:  none    2. Follow up plan:    - Need urgent nephrology visit [facility] #[phone]    - Follow up with PCP regarding weight loss   - Will touch base with you once your labs return today with further recommendation     3. Weigh yourself daily and write it down.     4. Call [facility] nurse if your weight is up more than 2 pounds in one day or 5 pounds in one week.     5. Call [facility] nurse if you feel more short of breath, have more abdominal bloating, or leg swelling.     6. Continue low sodium diet (less than 2000 mg daily). If you eat less salt, you will retain less fluid.     7. Alcohol can weaken your heart further. You should avoid alcohol or limit its use to special times, such as a holiday or birthday.      8. Do NOT take Aleve or ibuprofen without talking to your doctor first.      9. Lab Results: pending     [facility]: Cardiomyopathy, Optimization, Rehabilitation, Education  The [facility] is a heart failure specialty clinic within the [facility] where you will work with specialized nurse practitioners, physician assistants, doctors, and registered nurses. They are dedicated to helping patients with heart failure to carefully adjust medications, receive education, and learn who and when to call if symptoms develop. They specialize in helping you better understand your condition, slow the progression of your disease, improve the length and quality of your life, help you detect future heart problems before they become life threatening, and avoid hospitalizations.

## 2023-11-22 NOTE — PROGRESS NOTES
Refill Request Progress Note    [facility] Cardiology Refill Guideline reviewed.  Medication meets criteria for refill.    Corin Noble RN 11/22/23 4:46 PM

## 2023-12-11 PROBLEM — I61.9 INTRAPARENCHYMAL HEMORRHAGE OF BRAIN (H): Status: ACTIVE | Noted: 2023-01-01

## 2023-12-11 NOTE — PHARMACY-ADMISSION MEDICATION HISTORY
Pharmacist Admission Medication History    Admission medication history is complete. The information provided in this note is only as accurate as the sources available at the time of the update.    Information Source(s): Patient via in-person    Pertinent Information: Apixaban use is just Once daily.  ENTRESTO has not been started, continues on Irbesartan  Finasteride and Tamsulosin are currently on HOLD      Changes made to PTA medication list:  Added: None  Deleted: None  Changed: None           Allergies reviewed with patient and updates made in EHR: no    Medication History Completed By: Senthil Gamino [facility] 12/11/2023 5:39 PM    PTA Med List   Medication Sig Last Dose    apixaban ANTICOAGULANT (ELIQUIS) 2.5 MG tablet Take 1 tablet (2.5 mg) by mouth 2 times daily (Patient taking differently: Take 2.5 mg by mouth daily) 12/11/2023 at AM    carvedilol (COREG) 12.5 MG tablet Take 1 tablet (12.5 mg) by mouth 2 times daily (with meals) 12/11/2023 at AM    glucagon 1 MG kit 1 mg as needed for low blood sugar  at PRN    INSULIN PUMP - OUTPATIENT Medtronic device with no CGM and site change every 3 days   Sensitivity factor (midnight to midnight): 55  Bolus (units:gram): 5369-3643 = 1:9, 7294-3344 = 1:7, 8051-6487 = 1:7, 0830-6612 = 1:9, 9589-5854 = 1:13  Basal (units/hour): 8554-0257 = 0.45, 0552-7584 = 0.5, 5985-0489 = 0.625, 2607-5531 = 0.6, 9133-2728 = 0.45 12/11/2023    irbesartan (AVAPRO) 300 MG tablet Take 1 tablet (300 mg) by mouth At Bedtime 12/10/2023 at HS    potassium chloride ER (K-TAB/KLOR-CON) 10 MEQ CR tablet Take 1 tablet (10 mEq) by mouth daily 12/11/2023 at AM    torsemide (DEMADEX) 10 MG tablet Take 1 tablet (10 mg) by mouth as needed (For weight gain greater than 3 lbs overnight or increased swelling and/or shortness of breath)  at PRN    traMADol (ULTRAM-ER) 100 MG 24 hr tablet Take 1 tablet by mouth daily  at PRN

## 2023-12-11 NOTE — ED NOTES
Witnessed conversation with patient and provider [place]. Pt verbalized wanting to be a DNR/DNI.

## 2023-12-11 NOTE — ED PROVIDER NOTES
History     Chief Complaint:  Dizziness     HPI   Tc Garcia is a 83 year old male, on Eliquis, with history of atrial fibrillation, type II diabetes, hypertension, stage 3 CKD, and CHF who presents via EMS from home for evaluation of dizziness. The patient reports sudden onset of dizziness at 1300 today. States that he put his head down on the table and felt wobbly. Notes that when he stood up, he had a fall because he was feeling dizzy and nauseous. Adds that he has double vision. En route, the patient was placed in a cervical collar. He was given Zofran en route.    Review of Systems   Eyes:  Positive for visual disturbance (double vision).   Respiratory:  Negative for shortness of breath.    Cardiovascular:  Negative for chest pain.   Gastrointestinal:  Positive for nausea and vomiting.   Musculoskeletal:  Negative for back pain and neck pain.   Neurological:  Positive for dizziness. Negative for weakness and numbness.     Independent Historian:   None - Patient Only    Review of External Notes:   I reviewed cardiology note from 11/20/23.    Medications:    Eliquis  Carvedilol  Finasteride   Irbesartan  Glucagon  Humalog  Norco   Tamsulosin  Tramadol     Past Medical History:    Anemia  Stage 3 CKD  Hyperlipidemia  Atrial fibrillation  Pleural effusion, left  Pulmonary hypertension  Type II diabetes  Hypertension   Hemothorax, left  DKA  Acute cholecystitis  CHF  Diabetic nephropathy  Vitamin D deficiency     Past Surgical History:    Anesthesia cardioversion  Right heart catheterization  Tonsillectomy & adenoidectomy   Chest tube placement  Chest tube drain  Laparoscopic cholecystectomy   Laparoscopic herniorrhaphy inguinal bilateral     Physical Exam   Patient Vitals for the past 24 hrs:   BP Temp Temp src Pulse Resp SpO2 Weight   12/11/23 1530 127/76 -- -- 70 13 92 % --   12/11/23 1515 139/78 -- -- 72 19 -- --   12/11/23 1508 -- -- -- 67 25 100 % --   12/11/23 1507 (!) 147/88 -- -- 66 16 91 % --    12/11/23 1505 -- -- -- 77 11 100 % --   12/11/23 1504 -- -- -- 71 16 91 % --   12/11/23 1500 (!) 166/87 -- -- 71 -- -- --   12/11/23 1454 (!) 184/109 (!) 96.4  F (35.8  C) Temporal 63 18 -- 61.2 kg (135 lb)   12/11/23 1411 (!) 190/118 -- -- 64 16 99 % --      Constitutional:       General: Not in acute distress.     Appearance: Normal appearance  HENT:      Head: Normocephalic. Left scalp abrasion.  Eyes:     Extraocular Movements: Extraocular movements intact. Vertical nystagmus and right gaze preference, but overcomes midline.      Conjunctiva/sclera: Conjunctivae normal.      Pupils: Pupils are equal, round, and reactive to light.   Cardiovascular:     Rate and Rhythm: Normal rate and regular rhythm.   Pulmonary:      Effort: Pulmonary effort is normal.      Breath sounds: Normal breath sounds.   Abdominal:      General: Abdomen is flat. There is no distension.      Palpations: Abdomen is soft.      Tenderness: There is no abdominal tenderness.   Musculoskeletal:      Cervical back: Normal range of motion and neck supple. Cervical collar in place. No midline cervical spine tenderness to palpation.      General: No swelling or deformity.   Skin:     General: Skin is warm and dry.   Neurological:      NIHSS: Patient alert and keenly responsive. Able to answer month and age. Able and blink eyes and squeeze hands. Right gaze preference, but able to overcome midline. No visual field deficits. No facial palsy. No arm drift bilaterally. No leg drift bilaterally. Bilateral upper extremity dysmetria with significant difficulty completing finger nose finger. No sensory deficits. No language deficits/aphasia. No dysarthria. No extinction/inattention.     NIHSS score of 3.     Mental Status: Alert and oriented to person, place, and time.   Psychiatric:         Mood and Affect: Mood normal.         Behavior: Behavior normal.     Emergency Department Course   Imaging:  XR Chest Port 1 View   Preliminary Result   IMPRESSION:  No significant interval change, given differences in   technique. Small amount of pleural fluid and/or thickening on the   left. Linear opacities in the left midlung are likely related to   scarring and/or chronic atelectasis. Right lung is clear. Aortic   calcification. Pulmonary vascularity is within normal limits.       CT Cervical Spine w/o Contrast   Final Result   IMPRESSION: Compression fracture deformity of C7 again noted with   slight further loss of height since the comparison study. Compression   fracture deformity of the T2 vertebral body again noted with slight   further loss of height since the comparison study. There has been   interval healing of the nondisplaced fracture through the C3 spinous   process since the comparison study. No new fractures. Mild   degenerative anterolisthesis of C4 upon C5, C6 upon C7 and C7 upon T1.   Alignment otherwise normal. No spinal canal stenosis. No prevertebral   soft tissue swelling.         Radiation dose for this scan was reduced using automated exposure   control, adjustment of the mA and/or kV according to patient size, or   iterative reconstruction technique.      RICHMOND PRINGLE MD            SYSTEM ID:  [id number]      CTA Head Neck with Contrast   Final Result   Abnormal   IMPRESSION:   1. Plaque without stenosis of the proximal and distal internal carotid   arteries bilaterally.   2. Moderate presumed atherosclerotic narrowing of the P2 segments of   the posterior cerebral arteries bilaterally.   3. Otherwise, normal neck and head CTA.   4. Questionable active contrast extravasation into the parenchymal   hematoma centered on the inferior cerebellar vermis.      [Critical Result: Possible active hemorrhage into the cerebellar   parenchymal hematoma]      Finding was identified on 12/11/2023 2:49 PM.       SOLEDAD TORRES was contacted by Dr. Pringle on 12/11/2023 2:52 PM and   verbalized understanding of the critical result.          Radiation dose for this scan  was reduced using automated exposure   control, adjustment of the mA and/or kV according to patient size, or   iterative reconstruction technique.      RICHMOND PRINGLE MD            SYSTEM ID:  [id number]      CT Head w/o Contrast   Final Result   Abnormal   IMPRESSION:    1. Interval development of a presumed parenchymal hematoma centered in   the inferior cerebellar vermis measuring 1.5 x 3.0 cm in size.   2. Minimal intraventricular extension of hemorrhage into the fourth   ventricle and cerebral aqueduct.   3. No definite evidence for developing hydrocephalus.   4. Short-term repeat evaluation recommended to document stability of   the hematoma and to evaluate for developing hydrocephalus.   5. Diffuse cerebral volume loss and cerebral white matter changes   consistent with chronic small vessel ischemic disease.        [Critical Result: Intracranial hemorrhage (with questionable active   bleeding noted on the accompanying CT angiogram)]      Finding was identified on 12/11/2023 2:44 PM.       SOLEDAD TORRES was contacted by Dr. Pringle on 12/11/2023 2:47 PM and   verbalized understanding of the critical result.          Radiation dose for this scan was reduced using automated exposure   control, adjustment of the mA and/or kV according to patient size, or   iterative reconstruction technique.      RICHMOND PRINGLE MD            SYSTEM ID:  [id number]         Report per radiology    Laboratory:  Labs Ordered and Resulted from Time of ED Arrival to Time of ED Departure   BASIC METABOLIC PANEL - Abnormal       Result Value    Sodium 135      Potassium 5.0      Chloride 96 (*)     Carbon Dioxide (CO2) 29      Anion Gap 10      Urea Nitrogen 19.0      Creatinine 1.66 (*)     GFR Estimate 40 (*)     Calcium 9.3      Glucose 353 (*)    INR - Abnormal    INR 1.35 (*)    TROPONIN T, HIGH SENSITIVITY - Abnormal    Troponin T, High Sensitivity 34 (*)    CBC WITH PLATELETS AND DIFFERENTIAL - Abnormal    WBC Count 11.9 (*)     RBC Count  4.00 (*)     Hemoglobin 10.6 (*)     Hematocrit 34.2 (*)     MCV 86      MCH 26.5      MCHC 31.0 (*)     RDW 16.5 (*)     Platelet Count 295      % Neutrophils 84      % Lymphocytes 7      % Monocytes 5      % Eosinophils 2      % Basophils 1      % Immature Granulocytes 1      NRBCs per 100 WBC 0      Absolute Neutrophils 10.1 (*)     Absolute Lymphocytes 0.8      Absolute Monocytes 0.6      Absolute Eosinophils 0.3      Absolute Basophils 0.1      Absolute Immature Granulocytes 0.1      Absolute NRBCs 0.0     PARTIAL THROMBOPLASTIN TIME - Normal    aPTT 34     GLUCOSE MONITOR NURSING POCT   ROUTINE UA WITH MICROSCOPIC REFLEX TO CULTURE   TROPONIN T, HIGH SENSITIVITY   LACTIC ACID WHOLE BLOOD   BLOOD CULTURE   BLOOD CULTURE      Emergency Department Course & Assessments:     Interventions:  Medications   niCARdipine 40 mg in 200 mL NS (CARDENE) infusion (7.5 mg/hr Intravenous Rate/Dose Change 12/11/23 1457)   iopamidol (ISOVUE-370) solution 67 mL (67 mLs Intravenous $Given 12/11/23 1423)   sodium chloride 0.9 % bag 100mL (100 mLs Intravenous $Given 12/11/23 1423)   prochlorperazine (COMPAZINE) injection 5 mg (5 mg Intravenous $Given 12/11/23 1447)   diphenhydrAMINE (BENADRYL) injection 25 mg (25 mg Intravenous $Given 12/11/23 1448)   prothrombin 4 factor complex concentrate (KCENTRA) infusion 3,125 Units (3,125 Units Intravenous $Given 12/11/23 1519)   ondansetron (ZOFRAN) injection 4 mg (4 mg Intravenous $Given 12/11/23 1454)   metoclopramide (REGLAN) injection 5 mg (5 mg Intravenous $Given 12/11/23 1508)      Independent Interpretation (X-rays, CTs, rhythm strip):  See ED course    Assessments/Consultations/Discussion of Management or Tests:  ED Course as of 12/11/23 1923   Mon Dec 11, 2023   1413 I obtained history and examined the patient as noted above.    1416 Tier I code stroke.    1421 Dr. Arguello spoke with stroke neurology.    1423 I rechecked and updated the patient.    1449 I spoke with the reading  radiologist. Intraparenchymal hematoma with active bleeding.    1450 I spoke with Dr. Hermosillo from vascular neurology.    1451 I rechecked and updated the patient. I reassessed and performed another neurological exam. Will start the patient on Kcentra and nicardipine drip. Confirmed DNR/DNI code status.    1453 I spoke with Rosa Maria Rangel PA-C from neurosurgery.    1459 I rechecked the patient. On reexamination there is no cervical spinal tenderness to palpation.    1518 XR Chest Port 1 View  Rotated study.  Otherwise, on my independent interpretation of chest x-ray, there is no obvious focal opacity, mediastinal widening, pneumothorax.  Left midlung infiltrate.   1519 I spoke with Rosa Maria Rangel PA-C again regarding chest x ray findings. She is on her way to the ED and will reassess the patient and remove cervical collar if appropriate.    1535 Discussed patient with hospitalist Margarito Li MD who accepted patient for admission.   1546 Spoke with Ericka Rangel with neurosurgery who evaluated the patient.  They will continue to review imaging and history to decide on if collar can be removed.  Will defer cervical collar clearance to neurosurgery.     Social Determinants of Health affecting care:   None    Disposition:  The patient was admitted to the hospital under the care of Dr. Li.     Impression & Plan    CMS Diagnoses: The patient has stroke symptoms:         ED Stroke specific documentation           NIHSS PDF     Patient last known well time: 1300  ED Provider first to bedside at: 1413  CT Results received at: 1449    Thrombolytics:   Not given due to:   - active bleeding  - head trauma/stroke within the past 3 months  - DOAC dose within 48 hours or INR > 1.7    If treating with thrombolytics: Ensure SBP<180 and DBP<105 prior to treatment with thrombolytics.  Administering thrombolytics after treatment with IV labetalol, hydralazine, or nicardipine is reasonable once BP control is  established.    Endovascular Retrieval:  Not initiated due to absence of proximal vessel occlusion    [employer] Stroke Scale (Baseline)  Time Performed: 1413     Score    Level of consciousness: (0)   Alert, keenly responsive    LOC questions: (0)   Answers both questions correctly    LOC commands: (0)   Performs both tasks correctly    Best gaze: (1)   Partial gaze palsy    Visual: (0)   No visual loss    Facial palsy: (0)   Normal symmetrical movements    Motor arm (left): (0)   No drift    Motor arm (right): (0)   No drift    Motor leg (left): (0)   No drift    Motor leg (right): (0)   No drift    Limb ataxia: (2)   Present in two limbs    Sensory: (0)   Normal- no sensory loss    Best language: (0)   Normal- no aphasia    Dysarthria: (0)   Normal    Extinction and inattention: (0)   No abnormality        Total Score:  3        Stroke Mimics were considered (including migraine headache, seizure disorder, hypoglycemia (or hyperglycemia), head or spinal trauma, CNS infection, Toxin ingestion and shock state (e.g. sepsis) .    Medical Decision Makin y/o M as described above presents to the emergency department for dizziness, nausea, vomiting, and fall. Patient hypertensive with systolic pressure as high as 212 on arrival to the ER. Patient arrived in C-collar with evidence of left scalp abrasion. On blood thinners. NIHSS score of at least 3 and on initial evaluation patient had significant dysmetria in bilateral upper extremities and right gaze preference with ongoing nausea and vomiting. High concern/suspicion for posterior circulation CVA vs intracranial hemorrhage. Given timing of presentation to ER and symptom onset time with concern for ICH, Tier 1 stroke alert was activated and patient was sent to the CT scanner. Stroke neurology consulted. Patient was ultimately found to have intraparenchymal hemorrhage at the cerebellar vermis with concerns for active bleeding. Stroke  neuro-intensivist Dr. Hermosillo met with patient in stabilization bay after reviewing imaging and recommended initiation of nicardipine drip with blood pressure goal of <140 mmHg. Dr. Hermosillo had confirmed with daughter who states that patient is DNR and DNI. I did confirm this with the patient himself as well as patient is alert and oriented and decisional at this time. Given concern for ongoing bleeding, pharmacy was consulted to anticoagulation reversal with Kcentra. Patient remained in cervical collar, no midline cervical spine pain, but there's some concern regarding compression deformities, and thus, cervical spine clearance was deferred to neurosurgery service pending their evaluation. CXR ordered due to concern for aspiration from patient's multiple episodes of emesis. No obvious evidence of aspiration pneumonitis on imaging. Patient ultimately transferred to ICU in guarded condition. Discussed care plan with patient who voiced understanding and agreement with plan. Answered all questions. Additional workup/orders as listed in chart. Patient is up to date on tetanus vaccination.    Please refer to ED course above as part of continuation of MDM for details on the patient's treatment course and any changes or updates in care plan beyond my initial evaluation and MDM creation.    Critical Care Time: 30 minutes excluding procedures.       Diagnosis:    ICD-10-CM    1. Intraparenchymal hemorrhage of brain (H)  I61.9            Scribe Disclosure:  Delores HALEY, am serving as a scribe at 2:52 PM on 12/11/2023 to document services personally performed by Allan Farrar DO based on my observations and the provider's statements to me.     12/11/2023   Allan Farrar DO Yeh, Ferris, DO  12/11/23 1924

## 2023-12-11 NOTE — ED NOTES
Bed: ED24  Expected date:   Expected time:   Means of arrival:   Comments:  Sai 521 83M, dizzy, a-fib, HTN

## 2023-12-11 NOTE — H&P
[facility]  History and Physical   Hospitalist  Margarito Li MD       Tc Garcia MRN# [id number]   YOB: 1940 Age: 83 year old      Date of Admission:  12/11/2023         Assessment and Plan:   Tc Garcia is a 83 year old male with PMH significant for diabetes mellitus type 2 (on insulin pump, h/o DKA), paroxysmal atrial fibrillation (on Eliquis), hypertension, hyperlipidemia, pulmonary hypertension, h/o spontaneous intracranial hemorrhage, recurrent pleural effusion, chronic kidney disease stage 3, BPH and anemia of chronic disease who presented to the ER with dizziness and fall with head injury . Noted with intracranial hemorrhage . Admitted inpatient 12/11/23 .    Intraparenchymal hematoma  H/o spontaneous intracranial hemorrhage (1/2023)  paroxysmal atrial fibrillation (on eliquis)  -initial presentation with dizziness resulting in fall and head injury  -CT head on presentation noted 1.5 X 3 cm intraparenchymal hematoma centered in inferior cerebellar vermis with likely extension into fourth ventricle; no hydrocephalus  -CTA head and neck noted some atherosclerotic plaque with questionable active contrast extravasation into the hematoma  -CT cervical spine noted with compression fracture deformities C7 with slight further loss of height since comparison study and noted with mild degenerative changes    -Evaluated by stroke neurology and neurosurgery in ER  -anticoagulation was reversed with Kcentra in ED; hold off on PTA Eliquis  -will admit to ICU for close monitoring however patient declines any surgical intervention if needed  -Q2 hours neuro- checks are as per neuro- critical care  -repeat CT head in six hours  -started on Nicardipine pain drip to keep SBP goal<140  -might need to consider palliative care if clinical condition worsens  -will keep NPO; SLP evaluation  -IVF with NS at 50 ML per hour while NPO  -depending on clinical course, might need cardiology  evaluation prior to discharge for consideration of Watchman device given recurrent intracranial bleed while on anticoagulation    Diabetes mellitus type II  H/o diabetic ketoacidosis  -patient has insulin pump  -given intraparenchymal hematoma and NPO status, will hold off on insulin pump  -will have Q4 hr blood glucose checks and sliding scale insulin.    Likely acute on CKD stage III  -baseline creatinine around 1 to 1.1  -creatinine on admission 1.66  -will hold off on PTA Entresto, Irbesartan  -monitor BMP with IV fluids as above    Hypertension  diastolic CHF  - on Nicardipine drip as above  -will hold off on PTA antihypertensives including Coreg, Irbesartan and Entresto at this time while on NIcardipine drip and NPO  -will also hold off on PTA Demadex while getting IV fluids with NPO status  -monitor volume status closely    Anemia of chronic disease  -hemoglobin 10.6; monitor hemoglobin    Clinically Significant Risk Factors Present on Admission                 # Drug Induced Coagulation Defect: home medication list includes an anticoagulant medication        # Hypertension: Noted on problem list  # Chronic heart failure with preserved ejection fraction: heart failure noted on problem list and last echo with EF >50%                   DVT prophylaxis: mechanical with PCD boots  Code status: DNR/DNI    Care plan discussed with ED provider, patient and neurosurgery           Primary Care Physician:   Jessika Cordoba [phone]         Chief Complaint:     Dizziness and fall    History is obtained from the patient         History of Present Illness:     Tc Garcia is a 83 year old male with PMH significant for diabetes mellitus type 2 (on insulin pump, h/o DKA), paroxysmal atrial fibrillation (on Eliquis), hypertension, hyperlipidemia, pulmonary hypertension, h/o spontaneous intracranial hemorrhage, recurrent pleural effusion, chronic kidney disease stage 3, BPH and anemia of chronic disease who  presented to the ER with dizziness and fall with head injury . Noted with intracranial hemorrhage . Admitted inpatient 12/11/23 .    He does have history of prior intracranial hemorrhage. He lives with his wife. Today he reported sudden onset of dizziness at around 1 PM and fell with head injury after he heard his head on the cabinet.     In the ED he was seen by Dr. Farrar.  -CT head on presentation noted 1.5 X 3 cm intraparenchymal hematoma centered in inferior cerebellar vermis with likely extension into fourth ventricle; no hydrocephalus  -CTA head and neck noted some atherosclerotic plaque with questionable active contrast extravasation into the hematoma  -CT cervical spine noted with compression fracture deformities C7 with slight further loss of height since comparison study and noted with mild degenerative changes    -Evaluated by stroke neurology and neurosurgery in ER  -anticoagulation was reversed with Kcentra in ED    Neurosurgery discussed with patient and patient declines any surgical intervention if needed.    He was initiated on Nicardipine drip and hospitalist was requested admission for further evaluation.    The patient denies any fever, chills, rigors, chest pain or shortness of breath.  Denies pain abdomen.  No bowel or bladder disturbances.           Past Medical History:     diabetes mellitus type 2 (on insulin pump, h/o DKA)  paroxysmal atrial fibrillation (on Eliquis)  Hypertension  Hyperlipidemia  pulmonary hypertension  h/o spontaneous intracranial hemorrhage  recurrent pleural effusion  chronic kidney disease stage 3  BPH   anemia of chronic disease           Past Surgical History:     Past Surgical History:   Procedure Laterality Date    ANESTHESIA CARDIOVERSION N/A 2/3/2020    Procedure: ANESTHESIA, FOR CARDIOVERSION;  Surgeon: GENERIC ANESTHESIA PROVIDER;  Location:  OR    CV RIGHT HEART CATH MEASUREMENTS RECORDED N/A 5/13/2020    Procedure: Right Heart Cath;  Surgeon: Senthil Silva  MD Berny;  Location:  HEART CARDIAC CATH LAB    HC REMOVE TONSILS/ADENOIDS,<11 Y/O      T & A <12y.o.    IR CHEST TUBE DRAIN TUNNELED LEFT  5/12/2020    IR CHEST TUBE PLACEMENT NON-TUNNELLED LEFT  7/11/2020    IR CHEST TUBE REMOVAL NON TUNNELED LEFT  7/17/2020    IR CHEST TUBE REMOVAL TUNNELED LEFT  7/11/2020    LAPAROSCOPIC CHOLECYSTECTOMY N/A 11/22/2020    Procedure: CHOLECYSTECTOMY, LAPAROSCOPIC;  Surgeon: Annette Lambert MD;  Location:  OR    LAPAROSCOPIC HERNIORRHAPHY INGUINAL BILATERAL Bilateral 10/27/2020    Procedure: LAPAROSCOPIC BILATERAL INGUINAL HERNIA REPAIR WITH MESH;  Surgeon: Brian Garsia MD;  Location:  OR              Home Medications:     Prior to Admission Medications   Prescriptions Last Dose Informant Patient Reported? Taking?   Continuous Blood Gluc  (DEXCOM G6 ) JOSE   Yes No   Sig: Dexcom G6    USE EACH WITH 10 DAYS SENSORS   HUMALOG 100 UNIT/ML injection  Other Yes No   Sig: For refilling insulin pump   HYDROcodone-acetaminophen (NORCO) 5-325 MG tablet   Yes No   Sig: TAKE 1/2-1 TABLET BY MOUTH AS NEEDED FOR PAIN. MAX OF 1 TABLET PER DAY   INSULIN PUMP - OUTPATIENT  Other Yes No   Sig: [employer] device with no CGM and site change every 3 days   Sensitivity factor (midnight to midnight): 55  Bolus (units:gram): 1491-8410 = 1:9, 6580-2573 = 1:7, 1782-0865 = 1:7, 6975-9899 = 1:9, 3135-6477 = 1:13  Basal (units/hour): 1306-7939 = 0.45, 4501-2060 = 0.5, 5874-1635 = 0.625, 1100-6922 = 0.6, 9293-4275 = 0.45   acetaminophen (TYLENOL) 325 MG tablet   No No   Sig: Take 2 tablets (650 mg) by mouth every 6 hours as needed for mild pain or fever   apixaban ANTICOAGULANT (ELIQUIS) 2.5 MG tablet   No No   Sig: Take 1 tablet (2.5 mg) by mouth 2 times daily   Patient taking differently: Take 2.5 mg by mouth daily   carvedilol (COREG) 12.5 MG tablet   No No   Sig: Take 1 tablet (12.5 mg) by mouth 2 times daily (with meals)   doxycycline hyclate (VIBRAMYCIN) 50 MG  capsule   Yes No   Sig: TAKE 1 CAPSULE BY MOUTH TWICE DAILY WITH FOOD   Patient not taking: Reported on 2023   finasteride (PROSCAR) 5 MG tablet   No No   Sig: Take 1 tablet (5 mg) by mouth daily   glucagon 1 MG kit  Other Yes No   Si mg as needed for low blood sugar   irbesartan (AVAPRO) 300 MG tablet   No No   Sig: Take 1 tablet (300 mg) by mouth At Bedtime   potassium chloride ER (K-TAB/KLOR-CON) 10 MEQ CR tablet   No No   Sig: Take 1 tablet (10 mEq) by mouth daily   sacubitril-valsartan (ENTRESTO) 49-51 MG per tablet   No No   Sig: Take 1 tablet by mouth 2 times daily   Patient not taking: Reported on 2023   tamsulosin (FLOMAX) 0.4 MG capsule   Yes No   Sig: Take 0.4 mg by mouth every morning   torsemide (DEMADEX) 10 MG tablet   No No   Sig: Take 1 tablet (10 mg) by mouth as needed (For weight gain greater than 3 lbs overnight or increased swelling and/or shortness of breath)   Patient not taking: Reported on 2023   traMADol (ULTRAM-ER) 100 MG 24 hr tablet   Yes No   Sig: Take 1 tablet by mouth daily      Facility-Administered Medications: None            Allergies:     Allergies   Allergen Reactions    No Known Drug Allergy             Social History:   Tc Garcia  reports that he quit smoking about 15 years ago. His smoking use included cigarettes. He started smoking about 55 years ago. He has a 8.00 pack-year smoking history. He has never used smokeless tobacco. He reports that he does not currently use alcohol after a past usage of about 35.0 standard drinks of alcohol per week. He reports that he does not currently use drugs.              Family History:   Tc Garcia family history includes Diabetes in his brother, brother, brother, brother, sister, sister, and sister; Family History Negative in his brother, brother, father, mother, sister, sister, and sister; Heart Disease in his sister.    Family history was reviewed by myself and not pertinent to current presentation.            Review of Systems:   A10 point Review of Systems was done and were negative other than noted in the HPI.             Physical Exam:   Blood pressure 127/76, pulse 70, temperature (!) 96.4  F (35.8  C), temperature source Temporal, resp. rate 13, weight 61.2 kg (135 lb), SpO2 92%.  135 lbs 0 oz        Constitutional: awake and oriented X 3, somnolent but easily aroused able; lying comfortably in bed in no apparent distress   HEENT: Pupils equal and reactive to light and accomodation, EOMI intact; hard cervical collar in place   Oral cavity: Moist mucosa   Cardiovascular: Normal s1 s2, irregularly irregular rate and rhythm, no murmur   Lungs: B/l clear to auscultation, no wheezes or crepitations   Abdomen: Soft, nt, nd, no guarding, rigidity or rebound; BS +   LE : Mild bilateral edema   Musculoskeletal: Power 5/5 in all extremities   Neuro: No focal neurological deficits noted   Psychiatry: somnolent  Skin: No obvious skin rashes or ulcers             Data:   All new lab and imaging data was reviewed in Epic.   Significant labs and imagings include:    CMP notable for creatinine 1.66, glucose 353, normal lactic acid, troponin 34  CBC with WBC 11.9, hemoglobin 10.6  blood cultures pending  chest x-ray:  IMPRESSION: No significant interval change, given differences in  technique. Small amount of pleural fluid and/or thickening on the  left. Linear opacities in the left midlung are likely related to  scarring and/or chronic atelectasis. Right lung is clear. Aortic  calcification. Pulmonary vascularity is within normal limits.     CT cervical spine:  IMPRESSION: Compression fracture deformity of C7 again noted with  slight further loss of height since the comparison study. Compression  fracture deformity of the T2 vertebral body again noted with slight  further loss of height since the comparison study. There has been  interval healing of the nondisplaced fracture through the C3 spinous  process since the comparison  study. No new fractures. Mild  degenerative anterolisthesis of C4 upon C5, C6 upon C7 and C7 upon T1.  Alignment otherwise normal. No spinal canal stenosis. No prevertebral  soft tissue swelling.    CTA head and neck:  IMPRESSION:  1. Plaque without stenosis of the proximal and distal internal carotid  arteries bilaterally.  2. Moderate presumed atherosclerotic narrowing of the P2 segments of  the posterior cerebral arteries bilaterally.  3. Otherwise, normal neck and head CTA.  4. Questionable active contrast extravasation into the parenchymal  hematoma centered on the inferior cerebellar vermis.    CT head:  IMPRESSION:   1. Interval development of a presumed parenchymal hematoma centered in  the inferior cerebellar vermis measuring 1.5 x 3.0 cm in size.  2. Minimal intraventricular extension of hemorrhage into the fourth  ventricle and cerebral aqueduct.  3. No definite evidence for developing hydrocephalus.  4. Short-term repeat evaluation recommended to document stability of  the hematoma and to evaluate for developing hydrocephalus.  5. Diffuse cerebral volume loss and cerebral white matter changes  consistent with chronic small vessel ischemic disease.             Margarito Li MD  Hospitalist    Epidermal Sutures: 5-0 Fast Absorbing Gut

## 2023-12-11 NOTE — PROGRESS NOTES
12- I spoke w/ pt today, he has his portion of the [employer] RE Enrollment application for Entresto assistance program, I will send in the provider portion today.  And he will mail to [facility] his completed portion--Janae rubi LPN

## 2023-12-11 NOTE — CONSULTS
[facility]    Stroke Consult Note    Reason for Consult: Stroke Code     Chief Complaint: Dizziness    HPI  Tc Garcia is a 83 year old male with a PMH significant for atrial fibrillation (on Eliquis), CRF, HLD, Hx of ischemic stroke with hemorrhagic conversion, HTN,  and DM. Today, presented with new onset of dizziness, double vision, and nausea which began at 1300. Per chart review, patient stated that he put his head down on the table because he felt wobbly and notes when he stood up he fell due to sudden onset of dizziness and nausea. While in the CT scanner experience multiple episodes of emesis and was noted to have a right gaze preference. CTH revealed IPH centered in the inferior cerebellar vermis with extension into the fourth ventricle and cerebral aqueduct, no current signs of hydrocephalus. CTA revealed no evidence of AVM. Presenting BP was 190/118.     Attempted to call wife to gather additional details but no answer. Updated daughter Brittani about her father's condition and confirmed code status is DNR/DNI.     Imaging Findings  CTH: Interval development of a presumed parenchymal hematoma centered in the inferior cerebellar vermis measuring 1.5 x 3.0 cm in size. Minimal intraventricular extension of hemorrhage into the fourth ventricle and cerebral aqueduct. No definite evidence for developing hydrocephalus. Short-term repeat evaluation recommended to document stability of the hematoma and to evaluate for developing hydrocephalus. Diffuse cerebral volume loss and cerebral white matter changes consistent with chronic small vessel ischemic disease.    CTA Neck: Plaque without stenosis of the proximal and distal internal carotid arteries bilaterally. Moderate presumed atherosclerotic narrowing of the P2 segments of the posterior cerebral arteries bilaterally. Possible active hemorrhage into the cerebellar parenchymal hematoma    Intravenous Thrombolysis  - Active ICH   "    Endovascular Treatment  Not initiated due to absence of proximal vessel occlusion    Impression   Intraparenchymal hemorrhage centered in the inferior cerebellar vermis with extension into the fourth ventricle and cerebral aqueduct, etiology likely hypertensive and in the setting of chronic anti-coagulation (on Eliquis) vs traumatic after sudden onset of dizziness and fall      Recommendations  Acute Hemorrhagic Stroke Recommendations  - Reverse with Kcentra immediately   - Transfer to ICU  - STAT Neurosurgery consult   - Start Nicardipine gtt   - Neurochecks and Vital Signs every 1 hour  - Systolic BP Goal: < 140  - Imaging: Repeat CT non-contrast in 4 hours (ordered), please page vascular neurology with results   - Keep Head of bed elevated  - Telemetry, EKG  - Bedside Glucose Monitoring  - A1c, Troponin x 3  - PT/OT/SLP  - Stroke Education  - Euthermia, Euglycemia    Tiffanie Robins PA-C  Vascular Neurology    To page me or covering stroke neurology team member, click here: [facility]  Choose \"On Call\" tab at top, then select \"NEUROLOGY/ALL SITES\" from middle drop-down box, press Enter, then look for \"stroke\" or \"telestroke\" for your site.  ______________________________________________________    Clinically Significant Risk Factors Present on Admission               # Drug Induced Coagulation Defect: home medication list includes an anticoagulant medication    # Hypertension: Noted on problem list  # Chronic heart failure with preserved ejection fraction: heart failure noted on problem list and last echo with EF >50%                Past Medical History   Past Medical History:   Diagnosis Date    Anemia     Anemia of chronic disease 5/14/2020    Back pain     CKD (chronic kidney disease) stage 3, GFR 30-59 ml/min (H)     CRF (chronic renal failure), stage 3  5/14/2020    Fall 11/2019    suffered multiple left rib fractures, C3 and T2 fractures    Mixed hyperlipidemia 7/7/2004    Paroxysmal atrial fibrillation (H)  "    Personal history of colonic polyps 1972    gets colonoscopy every five years, due in 2006    Pleural effusion on left 11/2019    after multiple rib fractures    Pulmonary hypertension (H) 5/10/2020    Added automatically from request for surgery [id number]    Recurrent Left Pleural effusion -- S/P Pleurex Cath 5/12/20 12/30/2019    Rosacea 1989    Type II or unspecified type diabetes mellitus without mention of complication, not stated as uncontrolled 1999    Unspecified essential hypertension 1994     Past Surgical History   Past Surgical History:   Procedure Laterality Date    ANESTHESIA CARDIOVERSION N/A 2/3/2020    Procedure: ANESTHESIA, FOR CARDIOVERSION;  Surgeon: GENERIC ANESTHESIA PROVIDER;  Location:  OR    CV RIGHT HEART CATH MEASUREMENTS RECORDED N/A 5/13/2020    Procedure: Right Heart Cath;  Surgeon: Senthil Silva MD;  Location:  HEART CARDIAC CATH LAB    HC REMOVE TONSILS/ADENOIDS,<11 Y/O      T & A <11y.o.    IR CHEST TUBE DRAIN TUNNELED LEFT  5/12/2020    IR CHEST TUBE PLACEMENT NON-TUNNELLED LEFT  7/11/2020    IR CHEST TUBE REMOVAL NON TUNNELED LEFT  7/17/2020    IR CHEST TUBE REMOVAL TUNNELED LEFT  7/11/2020    LAPAROSCOPIC CHOLECYSTECTOMY N/A 11/22/2020    Procedure: CHOLECYSTECTOMY, LAPAROSCOPIC;  Surgeon: Annette Lambert MD;  Location:  OR    LAPAROSCOPIC HERNIORRHAPHY INGUINAL BILATERAL Bilateral 10/27/2020    Procedure: LAPAROSCOPIC BILATERAL INGUINAL HERNIA REPAIR WITH MESH;  Surgeon: Brian Garsia MD;  Location:  OR     Medications   Home Meds  Prior to Admission medications    Medication Sig Start Date End Date Taking? Authorizing Provider   acetaminophen (TYLENOL) 325 MG tablet Take 2 tablets (650 mg) by mouth every 6 hours as needed for mild pain or fever 4/27/23   Ana Jon MD   apixaban ANTICOAGULANT (ELIQUIS) 2.5 MG tablet Take 1 tablet (2.5 mg) by mouth 2 times daily  Patient taking differently: Take 2.5 mg by mouth daily 11/14/23   Lacie Tapia  APRN CNP   carvedilol (COREG) 12.5 MG tablet Take 1 tablet (12.5 mg) by mouth 2 times daily (with meals) 11/22/23   Lacie Tapia APRN CNP   Continuous Blood Gluc  (DEXCOM G6 ) JOSE Dexcom G6    USE EACH WITH 10 DAYS SENSORS    Reported, Patient   doxycycline hyclate (VIBRAMYCIN) 50 MG capsule TAKE 1 CAPSULE BY MOUTH TWICE DAILY WITH FOOD  Patient not taking: Reported on 11/16/2023    Reported, Patient   finasteride (PROSCAR) 5 MG tablet Take 1 tablet (5 mg) by mouth daily 4/25/23   Ana Jon MD   glucagon 1 MG kit 1 mg as needed for low blood sugar    Unknown, Entered By History   HUMALOG 100 UNIT/ML injection For refilling insulin pump 3/27/23   Unknown, Entered By History   HYDROcodone-acetaminophen (NORCO) 5-325 MG tablet TAKE 1/2-1 TABLET BY MOUTH AS NEEDED FOR PAIN. MAX OF 1 TABLET PER DAY 6/27/23   Reported, Patient   INSULIN PUMP - OUTPATIENT Medtronic device with no CGM and site change every 3 days   Sensitivity factor (midnight to midnight): 55  Bolus (units:gram): 8118-0707 = 1:9, 2362-1291 = 1:7, 6205-2698 = 1:7, 9551-3270 = 1:9, 2901-2744 = 1:13  Basal (units/hour): 2898-9709 = 0.45, 2523-1104 = 0.5, 5996-4895 = 0.625, 7239-3900 = 0.6, 6197-2531 = 0.45    Unknown, Entered By History   irbesartan (AVAPRO) 300 MG tablet Take 1 tablet (300 mg) by mouth At Bedtime 8/17/23   Cony Julien DO   potassium chloride ER (K-TAB/KLOR-CON) 10 MEQ CR tablet Take 1 tablet (10 mEq) by mouth daily 5/26/23   Ame Mejía PA-C   sacubitril-valsartan (ENTRESTO) 49-51 MG per tablet Take 1 tablet by mouth 2 times daily  Patient not taking: Reported on 11/20/2023 9/25/23   Lacie Tapia APRN CNP   tamsulosin (FLOMAX) 0.4 MG capsule Take 0.4 mg by mouth every morning    Reported, Patient   torsemide (DEMADEX) 10 MG tablet Take 1 tablet (10 mg) by mouth as needed (For weight gain greater than 3 lbs overnight or increased swelling and/or shortness of breath)  Patient not  taking: Reported on 2023 10/17/23   Lacie Tapia APRN CNP   traMADol (ULTRAM-ER) 100 MG 24 hr tablet Take 1 tablet by mouth daily    Reported, Patient       Scheduled Meds      Infusion Meds      PRN Meds      Allergies   Allergies   Allergen Reactions    No Known Drug Allergy      Family History   Family History   Problem Relation Age of Onset    Family History Negative Mother          age 71    Family History Negative Father          age 70    Diabetes Brother         alive age 77    Diabetes Brother         alive age 76    Family History Negative Brother             Family History Negative Brother             Diabetes Sister         alive age74    Family History Negative Sister          age 86    Heart Disease Sister             Family History Negative Sister             Family History Negative Sister             Diabetes Sister     Diabetes Sister     Diabetes Brother     Diabetes Brother      Social History   Social History     Tobacco Use    Smoking status: Former     Packs/day: 0.20     Years: 40.00     Additional pack years: 0.00     Total pack years: 8.00     Types: Cigarettes     Start date:      Quit date: 2008     Years since quitting: 15.9    Smokeless tobacco: Never   Vaping Use    Vaping Use: Never used   Substance Use Topics    Alcohol use: Not Currently     Alcohol/week: 35.0 standard drinks of alcohol    Drug use: Not Currently       PHYSICAL EXAMINATION  Temp:  [96.4  F (35.8  C)] 96.4  F (35.8  C)  Pulse:  [63-80] 70  Resp:  [11-25] 13  BP: (127-190)/() 130/70  SpO2:  [91 %-100 %] 99 %     General Exam  General:  patient sitting up in bed with c-collar on     Pulmonary:  no respiratory distress    Neuro Exam  Mental Status:  alert, oriented to self and situation, able to spell WORLD forwards and backwards, able to say the days of the week backwards, able to touch right ear with left hand, mild dysarthria,  follows commands, naming normal   Cranial Nerves:  right gaze preference though EOMs grossly intact, vertical nystagmus, strong cough, able to swallow, facial sensation intact and symmetric, facial movements symmetric, hearing not formally tested but intact to conversation, tongue protrusion midline  Motor:  no abnormal movements, able to move all limbs spontaneously, no pronator drift, upper extremity strength symmetric, lower extremity strength greater in R > L, left subtle weakness on ankle dorsiflexion,  Sensory:  light touch sensation intact and symmetric throughout upper and lower extremities, no extinction on double simultaneous stimulation   Coordination:  LUE ataxia present, subtle LLE ataxia present   Station/Gait:  deferred    Dysphagia Screen  Given location of [facility] recommend SLP evaluation     Stroke Scales    NIHSS  1a. Level of Consciousness 0-->Alert, keenly responsive   1b. LOC Questions 0-->Answers both questions correctly   1c. LOC Commands 0-->Performs both tasks correctly   2.   Best Gaze 1-->Partial gaze palsy, gaze is abnormal in one or both eyes, but forced deviation or total gaze paresis is not present (right preference)   3.   Visual 0-->No visual loss   4.   Facial Palsy 0-->Normal symmetrical movements   5a. Motor Arm, Left 0-->No drift, limb holds 90 (or 45) degrees for full 10 secs   5b. Motor Arm, Right 0-->No drift, limb holds 90 (or 45) degrees for full 10 secs   6a. Motor Leg, Left 0-->No drift, leg holds 30 degree position for full 5 secs   6b. Motor Leg, right 0-->No drift, leg holds 30 degree position for full 5 secs   7.   Limb Ataxia 2-->Present in two limbs (LUE/LLE)   8.   Sensory 0-->Normal, no sensory loss   9.   Best Language 0-->No aphasia, normal   10. Dysarthria 1-->Mild-to-moderate dysarthria, patient slurs at least some words and, at worst, can be understood with some difficulty   11. Extinction and Inattention  0-->No abnormality   Total 4 (12/11/23 5763)  "      Imaging  I personally reviewed all imaging; relevant findings per HPI.     Lab Results Data   CBC  Recent Labs   Lab 12/11/23  1419   WBC 11.9*   RBC 4.00*   HGB 10.6*   HCT 34.2*        Basic Metabolic Panel    Recent Labs   Lab 12/11/23  1647 12/11/23  1419   NA  --  135   POTASSIUM  --  5.0   CHLORIDE  --  96*   CO2  --  29   BUN  --  19.0   CR  --  1.66*   * 353*   LLOYD  --  9.3     Liver Panel  No results for input(s): \"PROTTOTAL\", \"ALBUMIN\", \"BILITOTAL\", \"ALKPHOS\", \"AST\", \"ALT\", \"BILIDIRECT\" in the last 168 hours.  INR    Recent Labs   Lab Test 12/11/23  1419 01/15/23  1321 11/21/20  1146   INR 1.35* 1.13 1.50*      Lipid Profile    Recent Labs   Lab Test 01/17/23  0436 05/13/20  0553   CHOL 112 116   HDL 50 60   LDL 52 43   TRIG 51 65     A1C    Recent Labs   Lab Test 04/20/23  1804 01/17/23  0436 11/20/20  2233   A1C 8.0* 7.2* 7.7*     Troponin    Recent Labs   Lab 12/11/23  1556 12/11/23  1419   CTROPT 30* 34*          Stroke Code Data Data   Stroke Code Data  (for stroke code without tele)  Stroke code activated 12/11/23  1418   First stroke provider response 12/11/23  1420   Last known normal 12/11/23  1300   Time of discovery (or onset of symptoms) 12/11/23  1300   Head CT read by Stroke Neuro Provider 12/11/23  1442   Was stroke code de-escalated? No             I personally examined and evaluated the patient today. At the time of my evaluation and management the patient was in critical condition today due to IPH. I personally managed Review of images. Key decisions made today included examination, documentation, review of images, immediate BP control. I spent a total of 90 minutes providing critical care services, evaluating the patient, directing care and reviewing laboratory values and radiologic reports.   "

## 2023-12-11 NOTE — ED TRIAGE NOTES
Pt comes from home after sudden onset of dizziness with double vision approx 1300.     Triage Assessment (Adult)       Row Name 12/11/23 1413          Triage Assessment    Airway WDL WDL        Respiratory WDL    Respiratory WDL WDL        Skin Circulation/Temperature WDL    Skin Circulation/Temperature WDL WDL        Cardiac WDL    Cardiac WDL WDL        Peripheral/Neurovascular WDL    Peripheral Neurovascular WDL WDL        Cognitive/Neuro/Behavioral WDL    Cognitive/Neuro/Behavioral WDL WDL

## 2023-12-11 NOTE — CONSULTS
Neurosurgery Consult    Assessment  84 yo male anit-coagulated on Eliquis presenting to ER with dizziness and diplopia, cerebellar hemorrhage noted on CT.     Plan:  Discussed options with patient including consideration of EVD.  Patient states he's not interested in any surgical intervention or an EVD.  He states he has a DNR/DNI status and does not wish for any intervention.  Eliquis being reversed and plan for ICU admission and repeat head CT.  In regard to his cervical spine will obtain flexion/extension cervical x-ray to determine plan of care.    HPI  Tc Garcia is a 83 year old male, on Eliquis, with history of atrial fibrillation, type II diabetes, hypertension, stage 3 CKD, and CHF who presents via EMS from home for evaluation of dizziness. The patient reports sudden onset of dizziness at 1300 today. States that he put his head down on the table and felt wobbly. Notes that when he stood up, he had a fall because he was feeling dizzy and nauseous. Adds that he has double vision.Denies neck pain.      Medical history  Anemia  Stage 3 CKD  Hyperlipidemia  Atrial fibrillation  Pleural effusion, left  Pulmonary hypertension  Type II diabetes  Hypertension   Hemothorax, left  DKA  Acute cholecystitis  CHF  Diabetic nephropathy  Vitamin D deficiency     Social history  Lives at home with his wife     Exam  B/P: 127/76, T: 96.4, P: 70, R: 13   He's awake and alert, no acute distress.  He's able to move all four extremities well, finger to nose off on left side, negative drift.  PERR.  GCS 14    No pain on palpation of cervical or thoracic spine.      Imaging  Head CT:  IMPRESSION:   1. Interval development of a presumed parenchymal hematoma centered in  the inferior cerebellar vermis measuring 1.5 x 3.0 cm in size.  2. Minimal intraventricular extension of hemorrhage into the fourth  ventricle and cerebral aqueduct.  3. No definite evidence for developing hydrocephalus.  4. Short-term repeat evaluation  recommended to document stability of  the hematoma and to evaluate for developing hydrocephalus.  5. Diffuse cerebral volume loss and cerebral white matter changes  consistent with chronic small vessel ischemic disease.      CTA  IMPRESSION:  1. Plaque without stenosis of the proximal and distal internal carotid  arteries bilaterally.  2. Moderate presumed atherosclerotic narrowing of the P2 segments of  the posterior cerebral arteries bilaterally.  3. Otherwise, normal neck and head CTA.  4. Questionable active contrast extravasation into the parenchymal  hematoma centered on the inferior cerebellar vermis.    Cervical CT:  IMPRESSION: Compression fracture deformity of C7 again noted with  slight further loss of height since the comparison study. Compression  fracture deformity of the T2 vertebral body again noted with slight  further loss of height since the comparison study. There has been  interval healing of the nondisplaced fracture through the C3 spinous  process since the comparison study. No new fractures. Mild  degenerative anterolisthesis of C4 upon C5, C6 upon C7 and C7 upon T1.  Alignment otherwise normal. No spinal canal stenosis. No prevertebral soft tissue swelling.    Discussed with Dr. Ortega.    MONICA Conn  [facility]  [address] [address]    Tel [phone]  Pager [phone]

## 2023-12-12 NOTE — PROGRESS NOTES
[facility]    Medicine Progress Note - Hospitalist Service    Date of Admission:  12/11/2023    Assessment & Plan   Tc Garcia is a 83 year old male with PMH significant for diabetes mellitus type 2 (on insulin pump, h/o DKA), paroxysmal atrial fibrillation (on Eliquis), hypertension, hyperlipidemia, pulmonary hypertension, h/o spontaneous intracranial hemorrhage, recurrent pleural effusion, chronic kidney disease stage 3, BPH and anemia of chronic disease who presented to the ER with dizziness and fall with head injury . Noted with intracranial hemorrhage .     Intraparenchymal hematoma  H/o spontaneous intracranial hemorrhage (1/2023)  paroxysmal atrial fibrillation (on eliquis)  *HCT with 1.5 X 3 cm intraparenchymal hematoma centered in inferior cerebellar vermis with likely extension into fourth ventricle; no hydrocephalus  *CTA head and neck noted some atherosclerotic plaque with questionable active contrast extravasation into the hematoma  *CT cervical spine noted with compression fracture deformities C7 with slight further loss of height since comparison study and noted with mild degenerative changes     Evaluated by stroke neurology and neurosurgery in ER  anticoagulation was reversed with Kcentra in ED  Continue to hold PTA Eliquis  Admitted to ICU for close monitoring however patient declines any surgical intervention (none recommended at this point)  Neurochecks, follow up imaging per neuro- critical care  Nicardipine drip to keep SBP goal<140  depending on clinical course, consider cardiology evaluation, consider  Watchman device given recurrent intracranial bleed while on anticoagulation     Diabetes mellitus type II  H/o diabetic ketoacidosis  patient has insulin pump, do not plan to use in hospital  Check metered glucose 4 times daily before meals and at bedtime  High scale corrective dose insulin    Mild-moderate oral-pharyngeal dysphagia   SLP consult requested, I  "appreciate Delia Slater's evaluation and recommendaitons  Level 4 diet (pureed) with thin liquids, per SLP, with ongoing therapies    Likely acute on CKD stage III  *baseline creatinine around 1 to 1.1  *creatinine on admission 1.66  hold PTA Entresto, Irbesartan  monitor BMP with IV fluids as above     Hypertension  diastolic CHF  on Nicardipine drip as above  Resume carvedilol, avoid rebound tachycardia  Resume Avapro, decrease dose  will also hold off on PTA Demadex while getting IV fluids   monitor volume status closely     Anemia of chronic disease  hemoglobin 10.6; monitor hemoglobin         Diet: Combination Diet Pureed Diet (level 4); Thin Liquids (level 0) (1:1 assist/supervision, sit at 90 degrees, only when alert, small sips, no straw, verify/cue swallows, meds with puree until)    DVT Prophylaxis: Pneumatic Compression Devices  Pruett Catheter: Not present  Lines: None     Cardiac Monitoring: ACTIVE order. Indication: ICU  Code Status: No CPR- Do NOT Intubate      Clinically Significant Risk Factors Present on Admission               # Drug Induced Coagulation Defect: home medication list includes an anticoagulant medication    # Hypertension: Noted on problem list  # Chronic heart failure with preserved ejection fraction: heart failure noted on problem list and last echo with EF >50%  # Acute Respiratory Failure: Documented O2 saturation < 91%.  Continue supplemental oxygen as needed                Disposition Plan     Expected Discharge Date: 12/13/2023                  Susu Rodriguez MD  Hospitalist Service  [facility]  Securely message with [employer] (more info)  Text page via [employer] Paging/Directory   ______________________________________________________________________    Interval History   \"What?\"  Patient was sleeping, woke to voice, seems perplexed by even simple questions.  He denies headache, no respiratory or GI complaints.    Physical Exam   Vital Signs: Temp: " 99.2  F (37.3  C) Temp src: Temporal BP: 121/68 Pulse: 85   Resp: 24 SpO2: 97 % O2 Device: Nasal cannula Oxygen Delivery: 4 LPM  Weight: 132 lbs 4.42 oz    Constitutional: Sleeping, wakes to voice but easily back to sleep again  Respiratory: Clear to auscultation bilaterally  Cardiovascular: Irregularly irregular rhythm  GI: Normal bowel sounds, soft, non-distended, non-tender  Skin/Integumen: No rash on exposed skin.  No lower extremity edema  Other: Mood is calm      Medical Decision Making       40 MINUTES SPENT BY ME on the date of service doing chart review, history, exam, documentation & further activities per the note.      Data     I have personally reviewed the following data over the past 24 hrs:    29.6 (H)  \   10.6 (L)   / 291     139 101 20.1 /  257 (H)   4.4 27 1.66 (H) \     Trop: 30 (H) BNP: N/A     Procal: N/A CRP: N/A Lactic Acid: 0.8       INR:  1.35 (H) PTT:  34   D-dimer:  N/A Fibrinogen:  N/A       Imaging results reviewed over the past 24 hrs:   Recent Results (from the past 24 hour(s))   CT Head w/o Contrast   Result Value    Radiologist flags Intracranial hemorrhage (with questionable active (AA)    Narrative    CT OF THE HEAD WITHOUT CONTRAST  12/11/2023 2:42 PM     COMPARISON: Head CT 10/26/2023.    HISTORY: Code Stroke. Evaluate for potential thrombolysis and  thrombectomy.    TECHNIQUE: 5 mm thick axial CT images of the head were acquired  without IV contrast material.    FINDINGS: There has been interval development of a hyperdense mass  centered in the inferior aspect of the cerebellar vermis measuring 1.5  x 3.0 cm in greatest AP and transverse dimensions, respectively. This  likely represents a parenchymal hematoma. There is presumed extension  of hemorrhage into the fourth ventricle and cerebral aqueduct without  evidence for hydrocephalus. Continued surveillance recommended to  document stability of the presumed hematoma and evaluate for  developing hydrocephalus.     There is  moderate diffuse cerebral volume loss. There are subtle  patchy areas of decreased density in the cerebral white matter  bilaterally that are consistent with sequela of chronic small vessel  ischemic disease. The ventricles and basal cisterns are within normal  limits in configuration given the degree of cerebral volume loss.   There is no midline shift. There are no extra-axial fluid collections.      No intracranial mass or recent infarct.    The visualized paranasal sinuses are well-aerated. There is no  mastoiditis. There are no fractures of the visualized bones.       Impression    IMPRESSION:   1. Interval development of a presumed parenchymal hematoma centered in  the inferior cerebellar vermis measuring 1.5 x 3.0 cm in size.  2. Minimal intraventricular extension of hemorrhage into the fourth  ventricle and cerebral aqueduct.  3. No definite evidence for developing hydrocephalus.  4. Short-term repeat evaluation recommended to document stability of  the hematoma and to evaluate for developing hydrocephalus.  5. Diffuse cerebral volume loss and cerebral white matter changes  consistent with chronic small vessel ischemic disease.      [Critical Result: Intracranial hemorrhage (with questionable active  bleeding noted on the accompanying CT angiogram)]    Finding was identified on 12/11/2023 2:44 PM.     SOLEDAD TORRES was contacted by Dr. Pringle on 12/11/2023 2:47 PM and  verbalized understanding of the critical result.       Radiation dose for this scan was reduced using automated exposure  control, adjustment of the mA and/or kV according to patient size, or  iterative reconstruction technique.    RICHMOND PRINGLE MD         SYSTEM ID:  [id number]   CTA Head Neck with Contrast   Result Value    Radiologist flags Possible active hemorrhage into the cerebellar (AA)    Narrative    CT ANGIOGRAM OF THE HEAD AND NECK WITHOUT AND WITH CONTRAST   12/11/2023 2:46 PM     COMPARISON: None.    HISTORY: Intracranial hemorrhage.  Altered mental status.    TECHNIQUE:  Precontrast localizing scans were followed by CT  angiography with an injection of 67 mL Isovue-370 nonionic intravenous  contrast material with scans through the head and neck.  Images were  transferred to a separate 3-D workstation where multiplanar  reformations and 3-D images were created.  Estimates of carotid  stenoses are made relative to the distal internal carotid artery  diameters except as noted.      FINDINGS:   Neck CTA: The common carotid arteries bilaterally are patent without  stenosis. There is calcified plaque at the internal carotid origins  bilaterally that does not result in any vascular narrowing on either  side. The vertebral arteries bilaterally are patent without stenosis.    Head CTA: There is calcified plaque of the intracranial distal  internal carotid arteries on both sides that does not result in any  significant vascular narrowing. There is presumed moderate  atherosclerotic narrowing of the P2 segments of the posterior cerebral  arteries bilaterally. There is normal variant fenestration of the  proximal basilar artery. The basilar, bilateral intracranial distal  internal carotid, bilateral anterior cerebral, bilateral middle  cerebral and bilateral posterior cerebral arteries are otherwise  patent without stenosis or occlusion.    Incidental note is made of a small focus of abnormal contrast  enhancement in the left side of the parenchymal hematoma centered in  the cerebellar vermis possibly representing active hemorrhage.  Short-term repeat evaluation head CT recommended to document stability  and size of the hematoma.      Impression    IMPRESSION:  1. Plaque without stenosis of the proximal and distal internal carotid  arteries bilaterally.  2. Moderate presumed atherosclerotic narrowing of the P2 segments of  the posterior cerebral arteries bilaterally.  3. Otherwise, normal neck and head CTA.  4. Questionable active contrast extravasation  into the parenchymal  hematoma centered on the inferior cerebellar vermis.    [Critical Result: Possible active hemorrhage into the cerebellar  parenchymal hematoma]    Finding was identified on 12/11/2023 2:49 PM.     SOLEDAD TORRES was contacted by Dr. Pringle on 12/11/2023 2:52 PM and  verbalized understanding of the critical result.       Radiation dose for this scan was reduced using automated exposure  control, adjustment of the mA and/or kV according to patient size, or  iterative reconstruction technique.    RICHMOND PRINGLE MD         SYSTEM ID:  [id number]   CT Cervical Spine w/o Contrast    Narrative    CT OF THE CERVICAL SPINE WITHOUT CONTRAST   12/11/2023 3:00 PM     COMPARISON: CT cervical spine 10/30/2019.    HISTORY: Trauma. Pain. Altered mental status.     TECHNIQUE: Axial images of the cervical spine were acquired without  intravenous contrast. Multiplanar reformations were created.        Impression    IMPRESSION: Compression fracture deformity of C7 again noted with  slight further loss of height since the comparison study. Compression  fracture deformity of the T2 vertebral body again noted with slight  further loss of height since the comparison study. There has been  interval healing of the nondisplaced fracture through the C3 spinous  process since the comparison study. No new fractures. Mild  degenerative anterolisthesis of C4 upon C5, C6 upon C7 and C7 upon T1.  Alignment otherwise normal. No spinal canal stenosis. No prevertebral  soft tissue swelling.      Radiation dose for this scan was reduced using automated exposure  control, adjustment of the mA and/or kV according to patient size, or  iterative reconstruction technique.    RICHMOND PRINGLE MD         SYSTEM ID:  [id number]   XR Chest Port 1 View    Narrative    CHEST ONE VIEW PORTABLE   12/11/2023 3:23 PM     HISTORY:  Vomiting. Possible aspiration.    COMPARISON: 4/27/2023.      Impression    IMPRESSION: No significant interval change, given  differences in  technique. Small amount of pleural fluid and/or thickening on the  left. Linear opacities in the left midlung are likely related to  scarring and/or chronic atelectasis. Right lung is clear. Aortic  calcification. Pulmonary vascularity is within normal limits.     HOMA BADILLO MD         SYSTEM ID:  [id number]   XR Cervical Spine w Flex/Ext G/E 4 Views    Narrative    EXAM: XR CERVICAL SPINE W FLEX/EXT G/E 4 VIEWS  LOCATION: [facility]  DATE: 12/11/2023    INDICATION: Altered mental status, trauma  COMPARISON:  Earlier same day CT cervical spine, MR thoracic spine 04/05/2023.      Impression    IMPRESSION: Vertebral body heights and alignment are unchanged with redemonstration of a chronic C7 compression fracture. No change in alignment with flexion and extension. Additional findings better assessed on CT.     CT Head w/o Contrast    Narrative    EXAM: CT HEAD W/O CONTRAST  LOCATION: [facility]  DATE: 12/11/2023    INDICATION: IPH, 4 hours stability scan, concern for hydrocephalus  COMPARISON:  Earlier same day CT head and MRI brain 04/25/2023.  TECHNIQUE: Dual-energy CT Head without IV contrast. Multiplanar reformats. Dose reduction techniques were used.    FINDINGS:  INTRACRANIAL CONTENTS: Similar appearance of the left cerebellar parenchymal hematoma with intraventricular extension. Unchanged ventriculomegaly. Redistribution of hemorrhage in the atria of the lateral ventricles. Parenchyma is unchanged with   redemonstration of multiple small chronic lacunar infarct in the right caudate head. No progressive mass effect or midline shift. Cerebellar tonsils are above the foramen magnum.    VISUALIZED ORBITS/SINUSES/MASTOIDS: Prior bilateral cataract surgery. Visualized portions of the orbits are otherwise unremarkable. No significant paranasal sinus mucosal disease. No middle ear or mastoid effusion.    BONES/SOFT TISSUES: No acute abnormality.       Impression    IMPRESSION:  1.  Stable left cerebellar parenchymal hematoma with intraventricular extension.  2.  Unchanged ventriculomegaly.  3.  Redistribution of hemorrhage in the atria of the lateral ventricles.   CT Head w/o Contrast    Narrative    EXAM: CT HEAD W/O CONTRAST  LOCATION: [facility]  DATE: 12/12/2023    INDICATION: Cerebellar hemorrhage follow-up.  COMPARISON: CT head without contrast 12/11/2023.  TECHNIQUE: Routine CT Head without IV contrast. Multiplanar reformats. Dose reduction techniques were used.    FINDINGS:  INTRACRANIAL CONTENTS: Again demonstrated is parenchymal hemorrhage along the anterior medial aspect of the left cerebellar hemisphere with intraventricular extension. The amount of intraventricular hemorrhage in the lateral ventricles is increased from   previous exam and intraventricular hemorrhage in the third and fourth ventricles is diminished from previous exam. This is presumably due to redistribution of blood products. Moderate dilatation of the ventricular system including the temporal horns is   similar to previous exam. No midline shift. Mild to moderate presumed chronic small vessel ischemic changes. Mild to moderate cerebral and cerebellar volume loss.     VISUALIZED ORBITS/SINUSES/MASTOIDS: Prior bilateral cataract surgery. Visualized portions of the orbits are otherwise unremarkable. Minimal mucosal thickening in the left ethmoid sinuses. No middle ear or mastoid effusion.    BONES/SOFT TISSUES: No acute abnormality.      Impression    IMPRESSION:  1.  No interval change in size of hemorrhage in the anteromedial left cerebellar hemisphere with intraventricular extension. There has been redistribution of intraventricular blood products from previous exam, as detailed.  2.  Moderate dilatation of the ventricular system is unchanged.

## 2023-12-12 NOTE — PLAN OF CARE
Neuro: A&Ox4. PERRLA. R gaze preference. Slightly garbled speech - pt states this is not his baseline. LUE slightly weaker than the right. Otherwise intact.  CV: a fib, nicardipine titrated to maintain SBP<140  Resp: room air  GI: NPO until speech eval  : DTV  Skin: bruised, head abrasion, mepilex to sacrum  Access: PIV  Gtts: NS, nicardipine  Other: plan for head CT and q2h neuros

## 2023-12-12 NOTE — PLAN OF CARE
"Goal Outcome Evaluation:      Plan of Care Reviewed With: patient    Overall Patient Progress: no changeOverall Patient Progress: no change    Outcome Evaluation: Pt remains in ICU. AOx4. Neuros q1hr. Follows in all four extremities, slightly weaker in LUE. Noted to have ataxia w/ finger to nose test. Pt endorses double vision.  Pt reports normal sensation in all four extremities. CT head obtained this evening and AM. UOP appropriate. Pt placed on 3L NC during sleep. Intermittient nasuea, prn medications given. Cardene gtt infusing to maintain SBP less than 140. NS infusing per MAR.      Problem: Adult Inpatient Plan of Care  Goal: Plan of Care Review  Description: The Plan of Care Review/Shift note should be completed every shift.  The Outcome Evaluation is a brief statement about your assessment that the patient is improving, declining, or no change.  This information will be displayed automatically on your shift  note.  Outcome: Not Progressing  Flowsheets (Taken 12/12/2023 4770)  Outcome Evaluation: Pt remains in ICU. AOx4. Neuros q1hr. Follows in all four extremities, slightly weaker in LUE. Noted to have ataxia w/ finger to nose test. Pt endorses double vision.  Pt reports normal sensation in all four extremities. CT head obtained this evening and AM. UOP appropriate. Pt placed on 3L NC during sleep. Intermittient nasuea, prn medications given. Cardene gtt infusing to maintain SBP less than 140. NS infusing per MAR.  Plan of Care Reviewed With: patient  Overall Patient Progress: no change  Goal: Patient-Specific Goal (Individualized)  Description: You can add care plan individualizations to a care plan. Examples of Individualization might be:  \"Parent requests to be called daily at 9am for status\", \"I have a hard time hearing out of my right ear\", or \"Do not touch me to wake me up as it startles  me\".  Outcome: Not Progressing  Goal: Readiness for Transition of Care  Outcome: Not Progressing  Intervention: " Mutually Develop Transition Plan  Recent Flowsheet Documentation  Taken 12/12/2023 0400 by Genet Choe, RN  Equipment Currently Used at Home: none     Problem: Risk for Delirium  Goal: Improved Attention and Thought Clarity  Outcome: Not Progressing  Intervention: Maximize Cognitive Function  Recent Flowsheet Documentation  Taken 12/12/2023 0410 by Genet Choe RN  Sensory Stimulation Regulation:   care clustered   lighting decreased   auditory stimulation minimized  Reorientation Measures:   calendar in view   clock in view   reorientation provided  Taken 12/12/2023 0015 by Genet Choe RN  Sensory Stimulation Regulation:   care clustered   lighting decreased   auditory stimulation minimized  Reorientation Measures:   calendar in view   clock in view   reorientation provided  Taken 12/11/2023 2015 by Genet Choe RN  Sensory Stimulation Regulation:   care clustered   lighting decreased   auditory stimulation minimized  Reorientation Measures:   calendar in view   clock in view   reorientation provided  Goal: Improved Sleep  Outcome: Not Progressing

## 2023-12-12 NOTE — CONSULTS
[facility]    Stroke Consult Note    Reason for Consult:  IPH    Chief Complaint: Dizziness       HPI  Tc Garcia is a 83 year old male with PMH DM2, paroxysmal atrial fibrillation (on Eliquis), HTN, HLD, pulmonary hypertension, Hx of ischemic stroke with hemorrhagic conversion, CKD, anemia, presented with dizziness, diplopia, nausea, When he stood up he fell due to sudden onset dizziness and nausea. In CT noted to have R gaze preference.  CTH revealed IPH centered in the inferior cerebellar vermis with extension into the fourth ventricle and cerebral aqueduct, no current signs of hydrocephalus. CTA revealed no evidence of AVM. Presenting BP was 190/118. AC was reversed with Kcentra.    Overnight CT stable, may have some hydrocephalus though has quite a bit of atrophy and prior scans had ventriculomegaly. Neurosurgery has seen and patient declining any surgical intervention including EVD. Repeat CT this morning stable. Remains on nicardipine.     -212  Max temp 98  HR 54-97  WBC 11.9>29.6  Na 139  Cr 1.66  Glucose 232-318      Stroke Evaluation Summarized    MRI/Head CT CT 12/11: IPH centered in the inferior cerebellar vermis with extension into the fourth ventricle and cerebral aqueduct     Repeat CT 12/11: increasing hydro    CTH: 12/12: stable IPH, IV extension, ventriculomegaly appears stable   Intracranial Vasculature CTA: mod renu P2 stenosis   Cervical Vasculature CTA: renu cervical ICA athero without significant stenosi     Echocardiogram pending   EKG/Telemetry afib   Other Testing Not Applicable     LDL  No lab value available in past 30 days   A1C  No lab value available in past 90 days   Troponin 12/11/2023: 30 ng/L       Impression  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 Adjustment of oral antihypertensives per hospitalist to achieve goal.  - HOLD anticoagulation  - Imaging: repeat head CT tomorrow AM, STAT head CT in the case of acute neuro changes  - Keep Head of bed elevated  - Telemetry, EKG  - TTE pending  - Bedside Glucose Monitoring. Needs improved blood glucose management, goal <180. Management per hospitalist.  - A1c, Troponin x 3, lipid panel  - PT/OT/SLP  - Stroke Education  - Euthermia, Euglycemia, Normonatremia  - Appreciate neurosurgery following  - Follow WBC, if persistent elevation and/or signs/symptomst of infection then infectious work up should be initiated       Patient Follow-up    - final recommendation pending work-up    Thank you for this consult. We will continue to follow.     Melia Schrader PA-C  Vascular Neurology    To page me or covering stroke neurology team member, click here: AMCOM  Choose \"On Call\" tab at top, then select \"NEUROLOGY/ALL SITES\" from middle drop-down box, press Enter, then look for \"stroke\" or \"telestroke\" for your site.  _____________________________________________________    Clinically Significant Risk Factors Present on Admission               # Drug Induced Coagulation Defect: home medication list includes an anticoagulant medication    # Hypertension: Noted on problem list  # Chronic heart failure with preserved ejection fraction: heart failure noted on problem list and last echo with EF >50%  # Acute Respiratory Failure: Documented O2 saturation < 91%.  Continue supplemental oxygen as needed         # Financial/Environmental Concerns: none      # CKD, Stage 3 (unspecified if a or b) (GFR 30 - 59): Will monitor and treat as appropriate  - last Cr =  1.66 mg/dL " (Ref range: 0.67 - 1.17 mg/dL)  - last GFR = 40 mL/min/1.73m2 (Ref range: >60 mL/min/1.73m2)       Past Medical History    Past Medical History:   Diagnosis Date    Anemia     Anemia of chronic disease 5/14/2020    Back pain     CKD (chronic kidney disease) stage 3, GFR 30-59 ml/min (H)     CRF (chronic renal failure), stage 3  5/14/2020    Fall 11/2019    suffered multiple left rib fractures, C3 and T2 fractures    Mixed hyperlipidemia 7/7/2004    Paroxysmal atrial fibrillation (H)     Personal history of colonic polyps 1972    gets colonoscopy every five years, due in 2006    Pleural effusion on left 11/2019    after multiple rib fractures    Pulmonary hypertension (H) 5/10/2020    Added automatically from request for surgery [id number]    Recurrent Left Pleural effusion -- S/P Pleurex Cath 5/12/20 12/30/2019    Rosacea 1989    Type II or unspecified type diabetes mellitus without mention of complication, not stated as uncontrolled 1999    Unspecified essential hypertension 1994     Medications   Home Meds  Prior to Admission medications    Medication Sig Start Date End Date Taking? Authorizing Provider   apixaban ANTICOAGULANT (ELIQUIS) 2.5 MG tablet Take 1 tablet (2.5 mg) by mouth 2 times daily  Patient taking differently: Take 2.5 mg by mouth daily 11/14/23  Yes Lacie Tapia APRN CNP   carvedilol (COREG) 12.5 MG tablet Take 1 tablet (12.5 mg) by mouth 2 times daily (with meals) 11/22/23  Yes Lacie Tapia APRN CNP   glucagon 1 MG kit 1 mg as needed for low blood sugar   Yes Unknown, Entered By History   INSULIN PUMP - OUTPATIENT Medtronic device with no CGM and site change every 3 days   Sensitivity factor (midnight to midnight): 55  Bolus (units:gram): 3129-2141 = 1:9, 5708-9395 = 1:7, 2221-7173 = 1:7, 8265-0745 = 1:9, 0682-0429 = 1:13  Basal (units/hour): 9938-7514 = 0.45, 0634-9044 = 0.5, 7371-4652 = 0.625, 7308-6928 = 0.6, 9755-2988 = 0.45   Yes Unknown, Entered By History   irbesartan (AVAPRO) 300 MG  tablet Take 1 tablet (300 mg) by mouth At Bedtime 8/17/23  Yes Cony Julien,    potassium chloride ER (K-TAB/KLOR-CON) 10 MEQ CR tablet Take 1 tablet (10 mEq) by mouth daily 5/26/23  Yes Ame Mejía PA-C   torsemide (DEMADEX) 10 MG tablet Take 1 tablet (10 mg) by mouth as needed (For weight gain greater than 3 lbs overnight or increased swelling and/or shortness of breath) 10/17/23  Yes Lacie Tapia APRN CNP   traMADol (ULTRAM-ER) 100 MG 24 hr tablet Take 1 tablet by mouth daily   Yes Reported, Patient   acetaminophen (TYLENOL) 325 MG tablet Take 2 tablets (650 mg) by mouth every 6 hours as needed for mild pain or fever 4/27/23   Ana Jon MD   Continuous Blood Gluc  (DEXCOM G6 ) JOSE Dexcom G6    USE EACH WITH 10 DAYS SENSORS    Reported, Patient   doxycycline hyclate (VIBRAMYCIN) 50 MG capsule TAKE 1 CAPSULE BY MOUTH TWICE DAILY WITH FOOD  Patient not taking: Reported on 11/16/2023    Reported, Patient   finasteride (PROSCAR) 5 MG tablet Take 1 tablet (5 mg) by mouth daily  Patient not taking: Reported on 12/11/2023 4/25/23   Ana Jon MD   HUMALOG 100 UNIT/ML injection For refilling insulin pump 3/27/23   Unknown, Entered By History   HYDROcodone-acetaminophen (NORCO) 5-325 MG tablet TAKE 1/2-1 TABLET BY MOUTH AS NEEDED FOR PAIN. MAX OF 1 TABLET PER DAY 6/27/23   Reported, Patient   sacubitril-valsartan (ENTRESTO) 49-51 MG per tablet Take 1 tablet by mouth 2 times daily  Patient not taking: Reported on 11/20/2023 9/25/23   Lacie Tapia APRN CNP   tamsulosin (FLOMAX) 0.4 MG capsule Take 0.4 mg by mouth every morning  Patient not taking: Reported on 12/11/2023    Reported, Patient       Scheduled Meds   [Held by provider] carvedilol  12.5 mg Oral BID w/meals    insulin aspart  1-6 Units Subcutaneous Q4H    [Held by provider] irbesartan  300 mg Oral At Bedtime    [START ON 12/13/2023] pantoprazole  40 mg Oral QAM AC       Infusion Meds   -  MEDICATION INSTRUCTIONS -      niCARdipine Stopped (12/12/23 0925)    sodium chloride 50 mL/hr at 12/12/23 0800       Allergies   Allergies   Allergen Reactions    No Known Drug Allergy           PHYSICAL EXAMINATION   Temp:  [96.4  F (35.8  C)-99.2  F (37.3  C)] 99.2  F (37.3  C)  Pulse:  [54-97] 76  Resp:  [9-81] 17  BP: (102-212)/() 126/70  SpO2:  [84 %-100 %] 95 %    General Exam  General:   lying in bed, no acute distress, drowsy     HEENT:  normocephalic/atraumatic  Pulmonary:  no respiratory distress      Neuro Exam  Mental Status:  drowsy, requires repeat stimulation to stay awake and participate with exam, oriented to hospital/month/person/situation, follows commands, speech mild to mod dysarthric  Cranial Nerves:  visual fields intact, PERRL, R gaze preference but can cross midline to L, facial sensation intact and symmetric, facial movements symmetric, hearing not formally tested but intact to conversation, dysarthric  Motor:  normal muscle tone and bulk, no abnormal movements, able to move all limbs spontaneously, mild weakness LUE/LLE with drift  Sensory:  light touch sensation intact and symmetric throughout upper and lower extremities, no extinction on double simultaneous stimulation   Coordination:   mild dysmetria with finger to nose and heel to shin on the L  Station/Gait:  deferred    Stroke Scales    NIHSS  1a. Level of Consciousness 1-->Alert, keenly responsive   1b. LOC Questions 0-->Answers both questions correctly   1c. LOC Commands 0-->Performs both tasks correctly   2.   Best Gaze 1-->Partial gaze palsy, gaze is abnormal in one or both eyes, but forced deviation or total gaze paresis is not present (right preference)   3.   Visual 0-->No visual loss   4.   Facial Palsy 0-->Normal symmetrical movements   5a. Motor Arm, Left 1-->No drift, limb holds 90 (or 45) degrees for full 10 secs   5b. Motor Arm, Right 0-->No drift, limb holds 90 (or 45) degrees for full 10 secs   6a. Motor Leg,  "Left 1-->No drift, leg holds 30 degree position for full 5 secs   6b. Motor Leg, right 0-->No drift, leg holds 30 degree position for full 5 secs   7.   Limb Ataxia 2-->Present in two limbs (LUE/LLE)   8.   Sensory 0-->Normal, no sensory loss   9.   Best Language 0-->No aphasia, normal   10. Dysarthria 1-->Mild-to-moderate dysarthria, patient slurs at least some words and, at worst, can be understood with some difficulty   11. Extinction and Inattention  0-->No abnormality   Total 6       Imaging  I personally reviewed all imaging; relevant findings per HPI.    Labs Data   CBC  Recent Labs   Lab 12/12/23  0543 12/11/23  1419   WBC 29.6* 11.9*   RBC 3.92* 4.00*   HGB 10.6* 10.6*   HCT 33.3* 34.2*    295     Basic Metabolic Panel   Recent Labs   Lab 12/12/23  0801 12/12/23  0543 12/12/23  0422 12/11/23  1647 12/11/23  1419   NA  --  139  --   --  135   POTASSIUM  --  4.4  --   --  5.0   CHLORIDE  --  101  --   --  96*   CO2  --  27  --   --  29   BUN  --  20.1  --   --  19.0   CR  --  1.66*  --   --  1.66*   * 258* 232*   < > 353*   LLOYD  --  9.4  --   --  9.3    < > = values in this interval not displayed.     Liver Panel  No results for input(s): \"PROTTOTAL\", \"ALBUMIN\", \"BILITOTAL\", \"ALKPHOS\", \"AST\", \"ALT\", \"BILIDIRECT\" in the last 168 hours.  INR    Recent Labs   Lab Test 12/11/23  1419 01/15/23  1321 11/21/20  1146   INR 1.35* 1.13 1.50*           Stroke Consult Data Data   This was a non-emergent, non-telestroke consult.  I personally examined and evaluated the patient today. At the time of my evaluation and management the patient was in critical condition today due to ICH. I personally managed chart and imaging review, exam. Key decisions made today included q2 neuro checks, BP management. I spent a total of 60 minutes providing critical care services, evaluating the patient, directing care and reviewing laboratory values and radiologic reports.    "

## 2023-12-12 NOTE — PROGRESS NOTES
Care Management Follow Up    Length of Stay (days): 1    Expected Discharge Date: 12/13/2023     Concerns to be Addressed: discharge planning     Patient plan of care discussed at interdisciplinary rounds: Yes    Anticipated Discharge Disposition: Home, Home Care, Transitional Care     Anticipated Discharge Services: Transportation Services  Anticipated Discharge DME: Other (see comment) (to be determined)    Patient/family educated on Medicare website which has current facility and service quality ratings:    Education Provided on the Discharge Plan: Yes  Patient/Family in Agreement with the Plan: unable to assess    Referrals Placed by CM/SW:    Private pay costs discussed: Not applicable    Additional Information:  Writer spoke with patients daughter regarding options for [facility] services and medical assistance. Provided resources (senior linkage line) and will make a referral to financial counseling to see if patient and spouse qualify.     JERE Kim

## 2023-12-12 NOTE — PROGRESS NOTES
Neurosurgery progress note:    Contacted regarding updated head CT:                                                                   IMPRESSION:  1.  Stable left cerebellar parenchymal hematoma with intraventricular extension.  2.  Unchanged ventriculomegaly.  3.  Redistribution of hemorrhage in the atria of the lateral ventricles.    Per nursing and medical team in house patient remains neuro intact with GCS 15.    Again confirmed patient does not wish for any surgical intervention including EVD.    Plan per stroke neurology.    Discussed with Dr. Ortega.    Rosa Maria Quesada MPAS  [facility]  [address] [address]    Tel [phone]  Pager [phone]

## 2023-12-12 NOTE — CONSULTS
Care Management Initial Consult    General Information  Assessment completed with: Family, ivett Obrien by phone  Type of CM/SW Visit: CM Role Introduction    Primary Care Provider verified and updated as needed: Yes   Readmission within the last 30 days: no previous admission in last 30 days      Reason for Consult: discharge planning  Advance Care Planning: Advance Care Planning Reviewed:  (Ivett Obrien encouraged to bring in her paperwork for honoring choices)          Communication Assessment  Patient's communication style: spoken language (English or Bilingual) (patient sleeping)    Hearing Difficulty or Deaf: yes   Wear Glasses or Blind: yes    Cognitive  Cognitive/Neuro/Behavioral: .WDL except, speech  Level of Consciousness: alert  Arousal Level: opens eyes spontaneously  Orientation: oriented x 4  Mood/Behavior: calm  Best Language: 1 - Mild to moderate  Speech: garbled    Living Environment:   People in home: spouse  Radha  Current living Arrangements: house      Able to return to prior arrangements: other (see comments) (awaiting recommendations)       Family/Social Support:  Care provided by: self, child(abelardo)  Provides care for: spouse  Marital Status:   Wife, Children  Radha       Description of Support System: Supportive, Involved    Support Assessment: Adequate social supports, Lacks necessary supervision and assistance    Current Resources:   Patient receiving home care services: No     Community Resources: None  Equipment currently used at home: none  Supplies currently used at home: Diabetic Supplies (has insulin pump)    Employment/Financial:  Employment Status: retired        Financial Concerns: none   Referral to Financial Worker: No       Does the patient's insurance plan have a 3 day qualifying hospital stay waiver?  Yes     Which insurance plan 3 day waiver is available? [employer]    Will the waiver be used for post-acute placement? Undetermined at this time    Lifestyle &  Psychosocial Needs:  Social Determinants of Health     Food Insecurity: Low Risk  (10/16/2023)    Food Insecurity     Within the past 12 months, did you worry that your food would run out before you got money to buy more?: No     Within the past 12 months, did the food you bought just not last and you didn t have money to get more?: No   Depression: Not at risk (11/16/2023)    PHQ-2     PHQ-2 Score: 0   Housing Stability: Low Risk  (10/16/2023)    Housing Stability     Do you have housing? : Yes     Are you worried about losing your housing?: No   Tobacco Use: Medium Risk (11/20/2023)    Patient History     Smoking Tobacco Use: Former     Smokeless Tobacco Use: Never     Passive Exposure: Not on file   Financial Resource Strain: Low Risk  (10/16/2023)    Financial Resource Strain     Within the past 12 months, have you or your family members you live with been unable to get utilities (heat, electricity) when it was really needed?: No   Alcohol Use: Not At Risk (10/27/2020)    AUDIT-C     Frequency of Alcohol Consumption: Never     Average Number of Drinks: Not on file     Frequency of Binge Drinking: Not on file   Transportation Needs: Low Risk  (10/16/2023)    Transportation Needs     Within the past 12 months, has lack of transportation kept you from medical appointments, getting your medicines, non-medical meetings or appointments, work, or from getting things that you need?: No   Physical Activity: Not on file   Interpersonal Safety: Not on file   Stress: Not on file   Social Connections: Not on file       Functional Status:  Prior to admission patient needed assistance: independent with ADL's use of walker, however 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 Per Bedside RN the patient did okay his Daughter Camille to receive a call. Writer called Camille and introduced role and scope of safe discharge planning.  Per Camille prior to this admission she noted that she is the only Daughter that is local  patients two other children Daughter Brittani lives in [place] and Leslie lives in [facility]. Camille informed this writer that her Dad provides care for their Mother who has dementia. She noted that the patient has been resistant to any hired help in the home or assisted living settings.  She notes that they have been checking in with her Mother daily and saw her in the home yesterday.  Camille is aware that the patient will be seen by therapies, briefly went over possible discharge to include TCU or other.  Writer explained that TCU is 10-14 days with a focus on returning to baseline, per Camille patient has been to [facility] in the past and would most likely prefer [facility].  Camille asked if someone would be able to provide more information about ongoing need for possible CADI Waiver and care home services, writer passed this information onto the , however writer informed Camille that it is usually a longer process and most people go on a waiting list for care home under Waivered services if approved.    Camille is aware that once the patient is able to meet with us we will continue to follow for further discharge planning needs. Explained process for transfer off of ICU and that most patient's are on lower care levels for a couple of days prior to discharge.  Writer sent Camille the number for ICU CCRN if additional questions.     Mary Braden RN, BSN, AC   Care Transitions Specialist  [facility]  Care Transitions Specialist  [address] [address]  [email or web address] [email or web address]  Office: [phone] Fax:  [phone]

## 2023-12-12 NOTE — PROGRESS NOTES
SLP Bedside Swallow Evaluation  12/12/23 0239   Appointment Info   Signing Clinician's Name / Credentials (SLP) Delia Domínguez MA [facility] SLP   General Information   Onset of Illness/Injury or Date of Surgery 12/11/23   Referring Physician Margarito Li MD   Patient/Family Therapy Goal Statement (SLP) Agreed to POC goal.  No additional goal was stated.   Pertinent History of Current Problem Per notes: Tc Garcia is a 83 year old male, on Eliquis, with history of atrial fibrillation, type II diabetes, hypertension, stage 3 CKD, and CHF who presents via EMS from home for evaluation of dizziness. The patient reports sudden onset of dizziness at 1300 today. States that he put his head down on the table and felt wobbly. Notes that when he stood up, he had a fall because he was feeling dizzy and nauseous. Adds that he has double vision.Denies neck pain.  Hx also includes ICH.   General Observations Pt was fatigued and mod-max cues were needed at times for pt to stay awake.  Dentures are not present and are at home due to concerns dentures may be lost again at the hospital.  Speech clarity was decreased at times.   Type of Evaluation   Type of Evaluation Swallow Evaluation   Oral Motor   Oral Musculature   (Mild decreased R labial ROM, slow oral motor movements)   Dentition (Oral Motor)   Dentition (Oral Motor) significant number of missing teeth  (Dentures are not present)   Cough/Swallow/Gag Reflex (Oral Motor)   Comment, Cough/Swallow/Gag Reflex (Oral Motor) Able to cough on command   Vocal Quality/Secretion Management (Oral Motor)   Secretion Management (Oral Motor)   (No coughing on secretions during the eval;  RN reports pt coughed up thick phlegm earlier today.)   Comment, Vocal Quality/Secretion Management (Oral Motor) Mild decreased vocal intensity   General Swallowing Observations   Past History of Dysphagia No dysphagia hx found during chart review.   Current Diet/Method of Nutritional Intake (General  Swallowing Observations, NIS) NPO   Swallowing Evaluation Clinical swallow evaluation   Clinical Swallow Evaluation   Feeding Assistance frequent cues/help required   Clinical Swallow Evaluation Textures Trialed thin liquids;pureed;minced & moist   Clinical Swallow Eval: Thin Liquid Texture Trial   Mode of Presentation, Thin Liquids spoon;cup   Volume of Liquid or Food Presented ice chips x 3, sips by cup x 5+, sip by straw x 1   Oral Phase of Swallow premature pharyngeal entry   Pharyngeal Phase of Swallow reduction in laryngeal movement  (delay suspected)   Diagnostic Statement cues were given to swallow at times due to fluctuating alertness, throat clearing after trial by straw   Clinical Swallow Evaluation: Puree Solid Texture Trial   Mode of Presentation, Puree spoon   Volume of Puree Presented tsps x 4   Oral Phase, Puree premature pharyngeal entry;delayed AP movement   Pharyngeal Phase, Puree reduction in laryngeal movement  (delay suspected)   Diagnostic Statement cues were given to swallow at times due to fluctuating alertness, no aspiration signs   Clinical Swallow Eval: Minced & Moist   Mode of Presentation spoon   Volume Presented tsps x 2   Oral Phase effortful AP movement;residue in oral cavity;delayed AP movement;premature pharyngeal entry;impaired mastication   Pharyngeal Phase reduction in laryngeal movement  (delay suspected)   Diagnostic Statement alternating to thin liquids needed to clear oral residue   Esophageal Phase of Swallow   Esophageal comments Some burping noted during po trials   Swallowing Recommendations   Diet Consistency Recommendations pureed (level 4);thin liquids (level 0)   Supervision Level for Intake 1:1 supervision needed   Mode of Delivery Recommendations no straws;bolus size, small;slow rate of intake  (verify/cue swallows, only when alert)   Swallowing Maneuver Recommendations alternate food and liquid intake;extra swallow   Monitoring/Assistance Required  (Eating/Swallowing) monitor for cough or change in vocal quality with intake;check mouth frequently for oral residue/pocketing   Recommended Feeding/Eating Techniques (Swallow Eval) maintain upright sitting position for eating;provide assist with feeding   Medication Administration Recommendations, Swallowing (SLP) Meds with puree; may try with thin liquids if increased awareness/alertness   Instrumental Assessment Recommendations   (to be determined)   Comment, Swallowing Recommendations Hold po if aspiration signs are noted, alertness is poor, and/or respiratory status declines.   Clinical Impression   Criteria for Skilled Therapeutic Interventions Met (SLP Eval) Yes, treatment indicated   SLP Diagnosis Mild-moderate oral-pharyngeal dysphagia   Risks & Benefits of therapy have been explained evaluation/treatment results reviewed;care plan/treatment goals reviewed;risks/benefits reviewed;current/potential barriers reviewed;participants voiced agreement with care plan;participants included;patient   Clinical Impression Comments Mild-moderate oral-pharyngeal dysphagia was observed at bedside during today's SLP swallow evaluation.  Deficits/risk factors include fluctuating alertness, decreased dentition, mild decreased oral motor function, delayed swallows with cues needed to swallow at times, mild decreased laryngeal elevation, oral residue after minced and moist trials, and throat clearing after thin liquids by straw.  Recommend cautious initiation of Pureed Diet (level 4); Thin Liquids (level 0) with 1:1 assist/supervision and cues to use strategies and verify swallows and alertness.  Hold po if aspiration signs are noted, alertness is poor, and/or respiratory status declines. Plan to continue Tx to assess diet tolerance, trial upgrades, and train strategies as indicated.   SLP Total Evaluation Time   Eval: oral/pharyngeal swallow function, clinical swallow Minutes (64210) 15   Interventions   Interventions Quick  Adds Swallowing Dysfunction   Swallowing Dysfunction &/or Oral Function for Feeding   Treatment of Swallowing Dysfunction &/or Oral Function for Feeding Minutes (64790) 10   Symptoms Noted During/After Treatment Fatigue   Treatment Detail/Skilled Intervention Education was provided to pt and RN regarding current deficits, recommended po intake, and use of safe swallow strategies.  Pt will need continued education, RN verbalized understanding.  Pt was able to produce a cough, given min-mod cues, after thin liquid trial with noted throat clearing.  Cough cleared vocal quality.   SLP Discharge Planning   SLP Plan SLP - assess po tolerance, trial upgrades if increased alertness +/- dentures present, train strategies   SLP Discharge Recommendation Transitional Care Facility   SLP Rationale for DC Rec Cognitive-linguistic and swallow function may be below baseline;  supervision after discharge if safety concerns are present   SLP Brief overview of current status  Mild-mod dysphagia; Rec: Pureed Diet (level 4); Thin Liquids (level 0), 1:1 assist/supervision, sit at 90 degrees, only when alert, small sips, no straw, verify/cue swallows, meds with puree until improved alertness, alternate textures, extra swallows as needed    Total Session Time   Total Session Time (sum of timed and untimed services) 25

## 2023-12-12 NOTE — PROGRESS NOTES
Brief Vascular neurology note:    Repeat CT Head at 8 PM showed stable cerebellar hemorrhage with worsening hydrocephalus. Discussed with RN, he continues to remain alert, awake and oriented x 4. Notified NSGY of CT findings, he declined EVD or any surgical intervention. We will continue frequent neuro checks q1h and repeat CT head at 4 AM. RN will notify us if his mental status declines.     Informed wife Radha of CT findings, she wanted me to discuss with her daughter Camille who did not answer my call.     Guilherme King MD     Department of Neurology

## 2023-12-12 NOTE — PROGRESS NOTES
Neurosurgery progress note    Patient denies neck pain, states he is feeling sleepy.    Exam    Able to follow commands, moving all extremities  Opens eyes to voice, somnolent  Cervical spine nontender to palpation    Imaging    Repeat head CT scan stable, stable ventriculomegaly and stable left cerebellar parenchymal hematoma with intraventricular extension.    Cervical flexion-extension x-ray without any dynamic instability    Assessment    84 yo male anit-coagulated on Eliquis presenting to ER with dizziness and diplopia, cerebellar hemorrhage noted on CT.   Chronic C7 compression fracture, chronic T2 compression fracture, without dynamic instability on flexion-extension x-ray    Plan    Patient is DNR/DNI, does not wish any intervention from neurosurgery, particular surgical intervention or an EVD.    With regards to the chronic C7 compression fracture, this is likely healed since his initial imaging 4 years ago, and there is no dynamic instability on flexion-extension x-ray, therefore no cervical collar is needed.    Neurosurgery will sign off

## 2023-12-13 NOTE — PROGRESS NOTES
[facility]    Stroke Progress Note    Interval Events  Last evening patient became disoriented, confused, agitated. STAT head CT with worsening hydro. Per neurosurgery note continues to refuse surgical intervention including EVD. WBC elevated and rising. UA abnormal. CXR with R>L  lower lung opacities suspicion for aspiration/infection.    This morning on assessment he is calm, oriented to person/place/month/situation. Denies headache. Does report diplopia and on exam has vertical nystagmus, decreased upgaze, L gaze preference, ? L 6th nerve palsy. Continued LUE/LLE mild weakness and ataxia.      HPI Summary  Tc Garcia is a 83 year old male with PMH DM2, paroxysmal atrial fibrillation (on Eliquis), HTN, HLD, pulmonary hypertension, Hx of ischemic stroke with hemorrhagic conversion, CKD, anemia, presented with dizziness, diplopia, nausea, When he stood up he fell due to sudden onset dizziness and nausea. In CT noted to have R gaze preference.  CTH revealed IPH centered in the inferior cerebellar vermis with extension into the fourth ventricle and cerebral aqueduct, no current signs of hydrocephalus. CTA revealed no evidence of AVM. Presenting BP was 190/118. AC was reversed with Kcentra.        -169  Max temp 99.2  HR   WBC 11.9>29.6>30.9  Na 137  Cr 1.63  Glucose 232-315      Stroke Evaluation Summarized     MRI/Head CT CT 12/11: IPH centered in the inferior cerebellar vermis with extension into the fourth ventricle and cerebral aqueduct      Repeat CT 12/11: increasing hydro     CTH: 12/12: stable IPH, IV extension, ventriculomegaly appears stable   Intracranial Vasculature CTA: mod renu P2 stenosis   Cervical Vasculature CTA: renu cervical ICA athero without significant stenosi      Echocardiogram EF 60-65%, severe biatrial enlargement, mild to mod TR   EKG/Telemetry afib   Other Testing Not Applicable      LDL  12/12/2023: 54 mg/dL   A1C  12/12/2023: 8.1 %   Troponin  "12/11/2023: 30 ng/L       Impression   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 Spontaneous improvement in mental status this morning would speak against this being the etiology of his agitation last evening.   Leukocytosis - infectious work up notable for abnormal UA and CXR. Now on ceftriaxone.  Hyperglycemia         Plan  - Neurochecks and Vital Signs every 2 hours>if he remains stable throughout the day today could transition to q4 hours neuro checks this evening at 2000 hrs  - Systolic BP Goal: 130-150. Adjustment of oral antihypertensives per hospitalist to achieve goal.  - HOLD anticoagulation  - Imaging: repeat head CT tomorrow morning (will order at 0800 per patient request)  - Keep Head of bed elevated  - Telemetry, EKG  - Bedside Glucose Monitoring. Needs improved blood glucose management, goal <180. Management per hospitalist.  - PT/OT/SLP   - Stroke Education  - Euthermia, Euglycemia, Normonatremia  - Appreciate neurosurgery following  - Antibiotics/infection management per primary team    If remains clinically stable and CT looks stable tomorrow tentative plan to transfer out of ICU tomorrow.    Patient Follow-up    - final recommendation pending work-up    We will continue to follow.     Melia Schrader PA-C  Vascular Neurology    To page me or covering stroke neurology team member, click here: [facility]  Choose \"On Call\" tab at top, then select \"NEUROLOGY/ALL SITES\" from middle drop-down box, press Enter, then look for \"stroke\" or \"telestroke\" for your site.  ______________________________________________________    Clinically Significant Risk Factors               # Coagulation Defect: INR = 1.35 (Ref range: 0.85 - 1.15) and/or " PTT = 34 Seconds (Ref range: 22 - 38 Seconds), will monitor for bleeding    # Hypertension: Noted on problem list  # Chronic heart failure with preserved ejection fraction: heart failure noted on problem list and last echo with EF >50%      # DMII: A1C = 8.1 % (Ref range: <5.7 %) within past 6 months       # Financial/Environmental Concerns: none           Medications   Scheduled Meds   carvedilol  12.5 mg Oral BID w/meals    insulin aspart  1-10 Units Subcutaneous TID AC    insulin aspart  1-7 Units Subcutaneous At Bedtime    irbesartan  150 mg Oral At Bedtime       Infusion Meds   - MEDICATION INSTRUCTIONS -      niCARdipine 2.5 mg/hr (12/13/23 0636)    sodium chloride 50 mL/hr at 12/12/23 2000       PRN Meds  acetaminophen, glucose **OR** dextrose **OR** glucagon, - MEDICATION INSTRUCTIONS -, ondansetron **OR** ondansetron, prochlorperazine **OR** prochlorperazine **OR** prochlorperazine, sodium chloride 0.9%       PHYSICAL EXAMINATION  Temp:  [98.2  F (36.8  C)-99.2  F (37.3  C)] 98.3  F (36.8  C)  Pulse:  [] 83  Resp:  [16-36] 23  BP: (112-169)/() 164/92  SpO2:  [91 %-100 %] 100 %      General Exam  General:   lying in bed, no acute distress, drowsy     HEENT:  normocephalic/atraumatic  Pulmonary:  no respiratory distress        Neuro Exam  Mental Status:  drowsy, oriented to hospital/month/person/situation, follows commands, speech mildly dysarthric  Cranial Nerves:  visual fields intact, R gaze preference but can cross midline to L, limited upgaze, vertical nystagmus, ?L 6th nerve palsy, facial sensation intact and symmetric, mild L facial droop, hearing not formally tested but intact to conversation, dysarthric  Motor:  normal muscle tone and bulk, no abnormal movements, able to move all limbs spontaneously, mild weakness LUE/LLE with drift  Sensory:  reports decreased sensation to touch RUE/RLE  Coordination:   mild dysmetria with finger to nose and heel to shin on the L  Station/Gait:   deferred    Stroke Scales    NIHSS  1a. Level of Consciousness 1-->Not alert, but arousable by minor stimulation to obey, answer, or respond   1b. LOC Questions 0-->Answers both questions correctly   1c. LOC Commands 0-->Performs both tasks correctly   2.   Best Gaze 1-->Partial gaze palsy, gaze is abnormal in one or both eyes, but forced deviation or total gaze paresis is not present   3.   Visual 0-->No visual loss   4.   Facial Palsy 1-->Minor paralysis (flattened nasolabial fold, asymmetry on smiling)   5a. Motor Arm, Left 1-->Drift, limb holds 90 (or 45) degrees, but drifts down before full 10 seconds, does not hit bed or other support   5b. Motor Arm, Right 0-->No drift, limb holds 90 (or 45) degrees for full 10 secs   6a. Motor Leg, Left 1-->Drift, leg falls by the end of the 5-sec period but does not hit bed   6b. Motor Leg, right 0-->No drift, leg holds 30 degree position for full 5 secs   7.   Limb Ataxia 2-->Present in two limbs   8.   Sensory 1-->Mild-to-moderate sensory loss, patient feels pinprick is less sharp or is dull on the affected side, or there is a loss of superficial pain with pinprick, but patient is aware of being touched   9.   Best Language 0-->No aphasia, normal   10. Dysarthria 1-->Mild-to-moderate dysarthria, patient slurs at least some words and, at worst, can be understood with some difficulty   11. Extinction and Inattention  0-->No abnormality   Total 9 (12/13/23 1203)         Imaging  I personally reviewed all imaging; relevant findings per HPI.     Lab Results Data   CBC  Recent Labs   Lab 12/12/23 2223 12/12/23  0543 12/11/23  1419   WBC 30.2* 29.6* 11.9*   RBC 3.75* 3.92* 4.00*   HGB 10.1* 10.6* 10.6*   HCT 32.3* 33.3* 34.2*    291 295     Basic Metabolic Panel    Recent Labs   Lab 12/13/23  0321 12/12/23  2223 12/12/23  2218 12/12/23  0801 12/12/23  0543 12/11/23  1647 12/11/23  1419   NA  --  137  --   --  139  --  135   POTASSIUM  --  4.6  --   --  4.4  --  5.0  "  CHLORIDE  --  100  --   --  101  --  96*   CO2  --  25  --   --  27  --  29   BUN  --  22.4  --   --  20.1  --  19.0   CR  --  1.63*  --   --  1.66*  --  1.66*   * 375* 323*   < > 258*   < > 353*   LLOYD  --  9.0  --   --  9.4  --  9.3    < > = values in this interval not displayed.     Liver Panel  No results for input(s): \"PROTTOTAL\", \"ALBUMIN\", \"BILITOTAL\", \"ALKPHOS\", \"AST\", \"ALT\", \"BILIDIRECT\" in the last 168 hours.  INR    Recent Labs   Lab Test 12/11/23  1419 01/15/23  1321 11/21/20  1146   INR 1.35* 1.13 1.50*      Lipid Profile    Recent Labs   Lab Test 12/12/23  0543 01/17/23  0436 05/13/20  0553   CHOL 137 112 116   HDL 68 50 60   LDL 54 52 43   TRIG 76 51 65     A1C    Recent Labs   Lab Test 12/12/23  0543 04/20/23  1804 01/17/23  0436   A1C 8.1* 8.0* 7.2*     Troponin    Recent Labs   Lab 12/11/23  1556 12/11/23  1419   CTROPT 30* 34*          Data I personally examined and evaluated the patient today. At the time of my evaluation and management the patient was in critical condition today due to ICH/IVH. I personally managed chart review, exam, discussion with patient/family/care team. Key decisions made today included continue current plan of care, morning head CT. I spent a total of 50 minutes providing critical care services, evaluating the patient, directing care and reviewing laboratory values and radiologic reports.    "

## 2023-12-13 NOTE — PROGRESS NOTES
"[facility]    Medicine Progress Note - Hospitalist Service    Date of Admission:  12/11/2023    Assessment & Plan   Tc Garcia is a 83 year old male with PMH significant for diabetes mellitus type 2 (on insulin pump, h/o DKA), paroxysmal atrial fibrillation (on Eliquis), hypertension, hyperlipidemia, pulmonary hypertension, h/o spontaneous intracranial hemorrhage, recurrent pleural effusion, chronic kidney disease stage 3, BPH and anemia of chronic disease who presented to the ER with dizziness and fall with head injury . Noted with intracranial hemorrhage .     Intraparenchymal hematoma  H/o spontaneous intracranial hemorrhage (1/2023)  paroxysmal atrial fibrillation (on eliquis)  *HCT with 1.5 X 3 cm intraparenchymal hematoma centered in inferior cerebellar vermis with likely extension into fourth ventricle; no hydrocephalus  *CTA head and neck noted some atherosclerotic plaque with questionable active contrast extravasation into the hematoma  *CT cervical spine noted with compression fracture deformities C7 with slight further loss of height since comparison study and noted with mild degenerative changes     Stroke neurology and neurosurgery consults requested  anticoagulation was reversed with Kcentra in ED  Continue to hold PTA Eliquis  Admitted to ICU for close monitoring however patient declines any surgical intervention (none recommended at this point)  Neurochecks, follow up imaging per neuro- critical care  Nicardipine drip to keep SBP goal<140  depending on clinical course, consider cardiology evaluation, consider  Watchman device given recurrent intracranial bleed while on anticoagulation  Per stroke neurology, \"If remains clinically stable and CT looks stable tomorrow tentative plan to transfer out of ICU tomorrow.\"     Diabetes mellitus type II  H/o diabetic ketoacidosis  patient has insulin pump, do not plan to use in hospital  Check metered glucose 4 times daily before " meals and at bedtime  High scale corrective dose insulin  Added basal insulin, ~0.1 unit per kg (12 units qam)    Mild-moderate oral-pharyngeal dysphagia   SLP consult requested, I appreciate Delia Slater's evaluation and recommendaitons  Level 4 diet (pureed) with thin liquids, per SLP, with ongoing therapies    Likely acute on CKD stage III  Pyuria, possible UTI  *baseline creatinine around 1 to 1.1  *creatinine on admission 1.66  hold PTA Entresto, Irbesartan  monitor BMP with IV fluids as above  UA with > 182 WBC/hpf, large leukocyte esterase.  Added Ceftriaxone for possible UTI, follow up urine culture result     Hypertension  diastolic CHF  Remains on Nicardipine drip as above  Add Amlodipine, hope to wean off Nicardipine gtt  Resumed PTA carvedilol, avoid rebound tachycardia  Resumed Avapro 12/12, increase to PTA dose  hold PTA Demadex since unclear if patient will take po   monitor volume status closely     Anemia of chronic disease  hemoglobin 10.6; monitor hemoglobin         Diet: Room Service  Combination Diet Pureed Diet (level 4); Mildly Thick (level 2) (1:1 assist/supervision, sit at 90 degrees, only when alert, small sips, no straw, verify/cue swallows, meds with puree until)    DVT Prophylaxis: Pneumatic Compression Devices  Rpuett Catheter: Not present  Lines: None     Cardiac Monitoring: ACTIVE order. Indication: ICU  Code Status: No CPR- Do NOT Intubate      Clinically Significant Risk Factors               # Coagulation Defect: INR = 1.35 (Ref range: 0.85 - 1.15) and/or PTT = 34 Seconds (Ref range: 22 - 38 Seconds), will monitor for bleeding    # Hypertension: Noted on problem list  # Chronic heart failure with preserved ejection fraction: heart failure noted on problem list and last echo with EF >50%      # DMII: A1C = 8.1 % (Ref range: <5.7 %) within past 6 months       # Financial/Environmental Concerns: none         Disposition Plan           Susu Rodriguez MD  Hospitalist Service  M  "[facility]  Securely message with [employer] (more info)  Text page via [employer] Paging/Directory   ______________________________________________________________________    Interval History   \"I need to go to the bathroom.\"  Patient said he needs to have a bowel movement.  He denies headache, no respiratory complaints.    Physical Exam   Vital Signs: Temp: 98.9  F (37.2  C) Temp src: Temporal BP: 129/81 Pulse: 87   Resp: 21 SpO2: 100 % O2 Device: None (Room air) Oxygen Delivery: 2 LPM  Weight: 134 lbs .63 oz    Constitutional: Drowsy, wakes to voice  Respiratory: Clear to auscultation bilaterally  Cardiovascular: Irregularly irregular rhythm  GI: Normal bowel sounds, soft, non-distended, non-tender  Skin/Integumen: No rash on exposed skin.  No lower extremity edema  Other: Mood is calm      Medical Decision Making       35 MINUTES SPENT BY ME on the date of service doing chart review, history, exam, documentation & further activities per the note.      Data     I have personally reviewed the following data over the past 24 hrs:    30.2 (H)  \   10.1 (L)   / 251     137 100 22.4 /  370 (H)   4.6 25 1.63 (H) \     TSH: N/A T4: N/A A1C: N/A     Procal: N/A CRP: N/A Lactic Acid: 1.3         Imaging results reviewed over the past 24 hrs:   Recent Results (from the past 24 hour(s))   Echo Limited   Result Value    LVEF  60-65%    Narrative    [id number]  [id number]  [id number]  [id number]^GIOVANI^LUDMILA^CLARE     [facility]  Echocardiography Laboratory  [address] [place] [address]     Name: CEDRICK PHILLIPS  MRN: [id number]  : 1940  Study Date: 2023 02:26 PM  Age: 83 yrs  Gender: Male  Patient Location: [facility]  Reason For Study: CVA  Ordering Physician: LUDMILA MATUTE  Referring Physician: KARRIE JALLOH  Performed By: Mary Whitten     BSA: 1.7 m2  Height: 69 in  Weight: 132 lb  HR: 92  BP: 144/86 " mmHg  ______________________________________________________________________________  Procedure  Limited Echo Adult.  ______________________________________________________________________________  Interpretation Summary     The left ventricle is normal in size. Left ventricular systolic function is  normal. The visual ejection fraction is 60-65%.  The right ventricle is moderately dilated. The right ventricular systolic  function is normal.  There is severe biatrial enlargement.  The right ventricular systolic pressure is approximated at 54 mmHg plus the  right atrial pressure.  There is mild to moderate (1-2+) tricuspid regurgitation.  Normal size aorta. Ascending aorta dilatation is present.  The rhythm was atrial fibrillation.  Compared to the prior study, LV/RV function is unchanged. No cardiac cause of  CVA identified. Bubble study was not performed.  ______________________________________________________________________________  Left Ventricle  The left ventricle is normal in size. Left ventricular systolic function is  normal. The visual ejection fraction is 60-65%.     Right Ventricle  The right ventricle is moderately dilated. The right ventricular systolic  function is normal.     Atria  There is severe biatrial enlargement.     Mitral Valve  There is moderate mitral annular calcification. There is trace mitral  regurgitation.     Tricuspid Valve  The right ventricular systolic pressure is approximated at 54mmHg plus the  right atrial pressure. There is mild to moderate (1-2+) tricuspid  regurgitation.     Aortic Valve  There is mild trileaflet aortic sclerosis. No aortic stenosis is present.     Pulmonic Valve  The pulmonic valve is not well visualized.     Vessels  Normal size aorta. Ascending aorta dilatation is present. IVC diameter and  respiratory changes fall into an intermediate range suggesting an RA pressure  of 8 mmHg.     Rhythm  The rhythm was atrial fibrillation.      ______________________________________________________________________________  MMode/2D Measurements & Calculations  IVSd: 1.3 cm  LVIDd: 4.2 cm  LVIDs: 3.3 cm  LVPWd: 1.3 cm  FS: 20.3 %  LV mass(C)d: 200.6 grams  LV mass(C)dI: 115.8 grams/m2     Ao root diam: 3.2 cm  asc Aorta Diam: 4.2 cm  LVOT diam: 2.0 cm  LVOT area: 3.1 cm2  Ao root diam index Ht(cm/m): 1.9  Ao root diam index BSA (cm/m2): 1.9  Asc Ao diam index BSA (cm/m2): 2.4  Asc Ao diam index Ht(cm/m): 2.4  LA Volume (BP): 97.4 ml  LA Volume Index (BP): 56.3 ml/m2  RWT: 0.62     TAPSE: 1.7 cm     Doppler Measurements & Calculations  PA acc time: 0.11 sec  RV S Williams: 9.8 cm/sec     ______________________________________________________________________________  Report approved by: Momo Baker 12/12/2023 03:47 PM         XR Chest Port 1 View    Narrative    EXAM: XR CHEST PORT 1 VIEW  LOCATION: [facility]  DATE: 12/12/2023    INDICATION: Confusion, rule out underlying infection  COMPARISON: 12/11/2023      Impression    IMPRESSION: Diffuse right greater than left lower lung opacities, suspicious for aspiration and/or infection. Small left pleural effusion versus pleural thickening. No pneumothorax. Unchanged cardiomegaly and mediastinal contours.   CT Head w/o Contrast    Narrative    EXAM: CT HEAD W/O CONTRAST  LOCATION: [facility]  DATE: 12/12/2023    INDICATION: Altered mental status, rule out hydrocephalus  COMPARISON: 12/12/2023 4:36 AM  TECHNIQUE: Routine CT Head without IV contrast. Multiplanar reformats. Dose reduction techniques were used.    FINDINGS:  INTRACRANIAL CONTENTS: Interval decreased size of focal parenchymal hematoma centered at the left cerebellar hemisphere measuring 2.1 x 1.5 cm, previously 2.9 x 1.8 cm. Persistent intraventricular hemorrhage with moderate amount of blood noted at the   fourth ventricle, cerebral aqueduct, and occipital horns. The layering occipital  hemorrhage appears slightly decreased in prominence when compared with prior. Worsening hydrocephalus with lateral ventricular diameter measuring 5.6 cm, previously 5.3 cm   and third ventricle measuring 1.5 cm, previously 1.2 cm. No CT evidence of acute infarct. Mild presumed chronic small vessel ischemic changes.     VISUALIZED ORBITS/SINUSES/MASTOIDS: Prior bilateral cataract surgery. Visualized portions of the orbits are otherwise unremarkable. No paranasal sinus mucosal disease. No middle ear or mastoid effusion.    BONES/SOFT TISSUES: No acute abnormality.      Impression    IMPRESSION:  1.  Interval worsening hydrocephalus.  2.  Interval decreased prominence of parenchymal hematoma at the left cerebellar hemisphere and intraventricular hemorrhage.   CT Head w/o Contrast    Narrative    EXAM: CT HEAD W/O CONTRAST  LOCATION: [facility]  DATE: 12/13/2023    INDICATION: Intracranial hemorrhage  COMPARISON: 12/12/2023 head CT  TECHNIQUE: Routine CT Head without IV contrast. Multiplanar reformats. Dose reduction techniques were used.    FINDINGS:  INTRACRANIAL CONTENTS: Redemonstration of inferomedial left cerebellar parenchymal hemorrhage centered in the dentate nucleus stable in size measuring 16 x 22 mm transverse and extending into the fourth ventricle. 2.5 mm rightward shift of the fourth   ventricle. Mild, stable hydrocephalus with slight increase in intraventricular layering hemorrhage in the occipital horns. Small amounts of posterior convexity and sylvian fissure subarachnoid hemorrhage slightly increased. No CT evidence of acute   infarct. Mild presumed chronic small vessel ischemic changes. Moderate generalized volume loss. No hydrocephalus. Skull base vascular calcifications.    VISUALIZED ORBITS/SINUSES/MASTOIDS: Prior bilateral cataract surgery. Visualized portions of the orbits are otherwise unremarkable. No paranasal sinus mucosal disease. No middle ear or mastoid  effusion.    BONES/SOFT TISSUES: No acute abnormality.      Impression    IMPRESSION:  1.  Stable volume of left cerebellar parenchymal hematoma with intraventricular extension    2.  Mild hydrocephalus, unchanged.    3.  Slight increase in intraventricular and subarachnoid hemorrhage.

## 2023-12-13 NOTE — PLAN OF CARE
Goal Outcome Evaluation:      Plan of Care Reviewed With: patient, spouse    Overall Patient Progress: improvingOverall Patient Progress: improving      Problem: Adult Inpatient Plan of Care  Goal: Plan of Care Review  Description: The Plan of Care Review/Shift note should be completed every shift.  The Outcome Evaluation is a brief statement about your assessment that the patient is improving, declining, or no change.  This information will be displayed automatically on your shift  note.  12/12/2023 1932 by Tracey Mg, RN  Outcome: Not Progressing  Flowsheets (Taken 12/12/2023 1932)  Outcome Evaluation: Patient alert and oriented, neuros show L facial droop, garbled speech, slight L sided weakness/drift at times, double vision. SBP within ordered range of 130-150. Pt required 2-5 L this shift. Up with strong assist of two. Pureed diet. BGs corrected with sliding scale insulin. At 1845 pt became agitated and restless and very disoriented. Neuro crit notified, STAT head CT ordered. Plan for head CT, CXR and UA.  12/12/2023 1930 by Tracey Mg, RN  Outcome: Not Progressing  12/12/2023 1929 by Tracey Mg, RN  Outcome: Not Progressing  12/12/2023 1858 by Tracey Mg, RN  Outcome: Progressing  Flowsheets (Taken 12/12/2023 1858)  Outcome Evaluation: [id number]: Patient extubated today at 1438, tolerating well on hi flow and intermittent cpap per NP order. Weak, congested cough. Diuresis effective this shift. Watermelon colored urine noted after diamox administration, MD aware. Vassopressor rate decreased. Off sedation. Plan to wean off Levo and monitor respiratory status.  Plan of Care Reviewed With:   patient   spouse  Overall Patient Progress: improving  Goal: Absence of Hospital-Acquired Illness or Injury  Intervention: Identify and Manage Fall Risk  Recent Flowsheet Documentation  Taken 12/12/2023 1600 by Tracey Mg, RN  Safety Promotion/Fall Prevention:   safety round/check completed   nonskid  shoes/slippers when out of bed   activity supervised  Taken 12/12/2023 1400 by Tracey Mg RN  Safety Promotion/Fall Prevention:   safety round/check completed   nonskid shoes/slippers when out of bed   activity supervised  Taken 12/12/2023 1200 by Tracey Mg RN  Safety Promotion/Fall Prevention:   safety round/check completed   nonskid shoes/slippers when out of bed   activity supervised  Taken 12/12/2023 1000 by Tracey Mg RN  Safety Promotion/Fall Prevention:   safety round/check completed   nonskid shoes/slippers when out of bed   activity supervised  Taken 12/12/2023 0800 by Tracey Mg RN  Safety Promotion/Fall Prevention:   safety round/check completed   nonskid shoes/slippers when out of bed   activity supervised  Intervention: Prevent Skin Injury  Recent Flowsheet Documentation  Taken 12/12/2023 1400 by Tracey Mg RN  Body Position: turned  Taken 12/12/2023 1200 by Tracey Mg RN  Body Position: turned  Taken 12/12/2023 1000 by Tracey Mg RN  Body Position: turned  Taken 12/12/2023 0800 by Tracey Mg RN  Body Position: turned  Intervention: Prevent and Manage VTE (Venous Thromboembolism) Risk  Recent Flowsheet Documentation  Taken 12/12/2023 1600 by Tracey Mg RN  VTE Prevention/Management:   SCDs (sequential compression devices) off   patient refused intervention  Taken 12/12/2023 1200 by Tracey Mg RN  VTE Prevention/Management:   SCDs (sequential compression devices) off   patient refused intervention  Taken 12/12/2023 0800 by Tracey Mg RN  VTE Prevention/Management:   SCDs (sequential compression devices) off   patient refused intervention  Intervention: Prevent Infection  Recent Flowsheet Documentation  Taken 12/12/2023 1600 by Tracey Mg RN  Infection Prevention: rest/sleep promoted  Taken 12/12/2023 1400 by Tracey Mg RN  Infection Prevention: rest/sleep promoted  Taken 12/12/2023 1200 by Tracey Mg RN  Infection  Prevention: rest/sleep promoted  Taken 12/12/2023 1000 by Tracey Mg RN  Infection Prevention: rest/sleep promoted  Taken 12/12/2023 0800 by Tracey Mg RN  Infection Prevention: rest/sleep promoted  Goal: Optimal Comfort and Wellbeing  Intervention: Provide Person-Centered Care  Recent Flowsheet Documentation  Taken 12/12/2023 1600 by Tracey Mg RN  Trust Relationship/Rapport: care explained  Taken 12/12/2023 1400 by Tracey Mg RN  Trust Relationship/Rapport: care explained  Taken 12/12/2023 1200 by Tracey Mg RN  Trust Relationship/Rapport: care explained  Taken 12/12/2023 1000 by Tracey Mg RN  Trust Relationship/Rapport: care explained  Taken 12/12/2023 0800 by Tracey Mg RN  Trust Relationship/Rapport: care explained     Problem: Risk for Delirium  Goal: Optimal Coping  Intervention: Optimize Psychosocial Adjustment to Delirium  Recent Flowsheet Documentation  Taken 12/12/2023 1600 by Tracey Mg RN  Supportive Measures: positive reinforcement provided  Taken 12/12/2023 1400 by Tracey Mg RN  Supportive Measures: positive reinforcement provided  Taken 12/12/2023 1200 by Tracey Mg RN  Supportive Measures: positive reinforcement provided  Taken 12/12/2023 1000 by Tracey Mg RN  Supportive Measures: positive reinforcement provided  Taken 12/12/2023 0800 by Tracey Mg RN  Supportive Measures: positive reinforcement provided  Goal: Improved Behavioral Control  Intervention: Minimize Safety Risk  Recent Flowsheet Documentation  Taken 12/12/2023 1600 by Tracey Mg RN  Communication Enhancement Strategies:   repetition utilized   one-step directions provided  Enhanced Safety Measures: room near unit station  Trust Relationship/Rapport: care explained  Taken 12/12/2023 1400 by Tracey Mg RN  Enhanced Safety Measures: room near unit station  Trust Relationship/Rapport: care explained  Taken 12/12/2023 1200 by Tracey Mg  RN  Communication Enhancement Strategies:   repetition utilized   one-step directions provided  Enhanced Safety Measures: room near unit station  Trust Relationship/Rapport: care explained  Taken 12/12/2023 1000 by Tracey Mg RN  Enhanced Safety Measures: room near unit station  Trust Relationship/Rapport: care explained  Taken 12/12/2023 0800 by Tracey Mg, RN  Communication Enhancement Strategies:   repetition utilized   one-step directions provided  Enhanced Safety Measures: room near unit station  Trust Relationship/Rapport: care explained  Goal: Improved Attention and Thought Clarity  Intervention: Maximize Cognitive Function  Recent Flowsheet Documentation  Taken 12/12/2023 1600 by Tracey Mg, RN  Sensory Stimulation Regulation: care clustered  Reorientation Measures:   calendar in view   clock in view  Taken 12/12/2023 1200 by Tracey Mg RN  Sensory Stimulation Regulation: care clustered  Reorientation Measures:   calendar in view   clock in view  Taken 12/12/2023 0800 by Tracey Mg, RN  Sensory Stimulation Regulation: care clustered  Reorientation Measures:   calendar in view   clock in view

## 2023-12-13 NOTE — PLAN OF CARE
Goal Outcome Evaluation:    Pt much improved this AM, oriented x4 but lethargic. Moves all extremities. PERRL. SBP <140, nicardipine gtt off since 1030. BG progressing to normoglycemia with lantus added today. Sitter at bedside for restlessness. DD4, mildly thick liquids. Plan for repeat head CT at 0800 tomorrow AM. A2, GB pivot to chair. Up in chair x2 today.

## 2023-12-13 NOTE — PROVIDER NOTIFICATION
Text page to Dr. Hermosillo    Pt very disoriented, confused, agitated, concern for neuro changes, please advice. Thank you!    MD response:

## 2023-12-13 NOTE — PLAN OF CARE
Goal Outcome Evaluation:      Plan of Care Reviewed With: patient, spouse    Overall Patient Progress: improvingOverall Patient Progress: improving    Outcome Evaluation: [id number]: Patient extubated today at 1438, tolerating well on hi flow and intermittent cpap per NP order. Weak, congested cough. Diuresis effective this shift. Watermelon colored urine noted after diamox administration, MD aware. Vassopressor rate decreased. Off sedation. Plan to wean off Levo and monitor respiratory status.

## 2023-12-13 NOTE — PLAN OF CARE
Goal Outcome Evaluation:      Plan of Care Reviewed With: patient    Overall Patient Progress: no changeOverall Patient Progress: no change    Outcome Evaluation: Pt disoriented overnight, improved this AM. Moves all extremities. PERRL. -150, 1x dose hydralazine given. Nicardipine gtt restarted this AM. STAT head CT complete, repeat this CT this AM. Discussed results w/ neurology, no new orders at this time as pt still declining EVD. BG corrected w/ SS insulin. Sitter at bedside overnight for restlessness and pulling at lines.      Problem: Glycemic Control Impaired  Goal: Blood Glucose Level Within Targeted Range  Outcome: Not Progressing     Problem: Adult Inpatient Plan of Care  Goal: Plan of Care Review  Description: The Plan of Care Review/Shift note should be completed every shift.  The Outcome Evaluation is a brief statement about your assessment that the patient is improving, declining, or no change.  This information will be displayed automatically on your shift  note.  Outcome: Progressing  Flowsheets (Taken 12/13/2023 0640)  Outcome Evaluation: Pt disoriented overnight, improved this AM. Moves all extremities. PERRL. -150, 1x dose hydralazine given. Nicardipine gtt restarted this AM. STAT head CT complete, repeat this CT this AM. Discussed results w/ neurology, no new orders at this time as pt still declining EVD. BG corrected w/ SS insulin. Sitter at bedside overnight for restlessness and pulling at lines.  Plan of Care Reviewed With: patient  Overall Patient Progress: no change     Problem: Stroke, Intracerebral Hemorrhage  Goal: Optimal Cerebral Tissue Perfusion  Outcome: Progressing  Intervention: Protect and Optimize Cerebral Perfusion  Recent Flowsheet Documentation  Taken 12/13/2023 0400 by Catia Tineo, RN  Sensory Stimulation Regulation: care clustered  Taken 12/13/2023 0000 by Catia Tineo RN  Sensory Stimulation Regulation: care clustered  Taken 12/12/2023 2000 by  Brekken, Catia P, RN  Sensory Stimulation Regulation: care clustered  Goal: Optimal Cognitive Function  Outcome: Progressing  Intervention: Optimize Cognitive Function  Recent Flowsheet Documentation  Taken 12/13/2023 0400 by Catia Tineo RN  Sensory Stimulation Regulation: care clustered  Reorientation Measures:   calendar in view   clock in view  Taken 12/13/2023 0000 by Catia Tineo RN  Sensory Stimulation Regulation: care clustered  Reorientation Measures:   calendar in view   clock in view  Taken 12/12/2023 2000 by Catia Tineo RN  Sensory Stimulation Regulation: care clustered  Reorientation Measures:   calendar in view   clock in view  Goal: Effective Oxygenation and Ventilation  Outcome: Progressing  Intervention: Optimize Oxygenation and Ventilation  Recent Flowsheet Documentation  Taken 12/13/2023 0600 by Catia Tineo RN  Head of Bed (HOB) Positioning: HOB at 30 degrees  Taken 12/13/2023 0400 by Catia Tineo RN  Head of Bed (HOB) Positioning: HOB at 30 degrees  Taken 12/13/2023 0200 by Catia Tineo RN  Head of Bed (HOB) Positioning: HOB at 30 degrees  Taken 12/13/2023 0000 by Catia Tineo RN  Head of Bed (HOB) Positioning: HOB at 30 degrees  Taken 12/12/2023 2200 by Catia Tineo RN  Head of Bed (HOB) Positioning: HOB at 30 degrees  Taken 12/12/2023 2000 by Catia Tineo RN  Head of Bed (HOB) Positioning: HOB at 30 degrees

## 2023-12-14 NOTE — PLAN OF CARE
"Goal Outcome Evaluation:      Plan of Care Reviewed With: patient    Overall Patient Progress: no changeOverall Patient Progress: no change    Neuro: Confused to place, time, situation 1x throughout the night; otherwise A/Ox4. LUE ataxia. Slight L facial droop. Double vision.   CV: AFIB CVR. SBP goal<150- PRN Labetalol & PRN Catapres given.  Resp: 1L NC.   GI: Pureed diet w/ mild thick liquids.   : External catheter in place.   Skin: L scalp abrasion. Scattered bruises/scabs.   Access: R AC PIV.  Gtts: NS.  Outcome Evaluation: Pt became disoriented overnight 1x otherwise A/Ox4. Pt stating \"I do not want the scan in the morning, I do not care what it will show. I do not want to know. \"- Regarding the repeat head CT scan scheduled at 0800. Admin PRN Labetalol & PRN Catapres for SBP goal<150.      Problem: Stroke, Intracerebral Hemorrhage  Goal: Optimal Coping  Outcome: Not Progressing  Intervention: Support Psychosocial Response to Stroke  Recent Flowsheet Documentation  Taken 12/14/2023 0400 by Rosana Lara, RN  Supportive Measures:   active listening utilized   relaxation techniques promoted   self-care encouraged  Taken 12/14/2023 0000 by Rosana Lara RN  Supportive Measures:   active listening utilized   relaxation techniques promoted   self-care encouraged  Taken 12/13/2023 2000 by Rosana Lara RN  Supportive Measures:   active listening utilized   relaxation techniques promoted   self-care encouraged     "

## 2023-12-14 NOTE — PROGRESS NOTES
"[facility]    Medicine Progress Note - Hospitalist Service    Date of Admission:  12/11/2023    Assessment & Plan   Tc Garcia is a 83 year old male with PMH significant for diabetes mellitus type 2 (on insulin pump, h/o DKA), paroxysmal atrial fibrillation (on Eliquis), hypertension, hyperlipidemia, pulmonary hypertension, h/o spontaneous intracranial hemorrhage, recurrent pleural effusion, chronic kidney disease stage 3, BPH and anemia of chronic disease who presented to the ER with dizziness and fall with head injury . Noted with intracranial hemorrhage .     Intraparenchymal hematoma  H/o spontaneous intracranial hemorrhage (1/2023)  paroxysmal atrial fibrillation (on eliquis)  *HCT with 1.5 X 3 cm intraparenchymal hematoma centered in inferior cerebellar vermis with likely extension into fourth ventricle; no hydrocephalus  *CTA head and neck noted some atherosclerotic plaque with questionable active contrast extravasation into the hematoma  *CT cervical spine noted with compression fracture deformities C7 with slight further loss of height since comparison study and noted with mild degenerative changes     anticoagulation was reversed with Kcentra in ED  Admitted to ICU for close monitoring  Stroke neurology consult requested, I appreciate Melia Schrader's evaluation and recommendations  Per her, \"HOLD anticoagulation. Not an ASPIRE candidate given ICH within the past year. Plan to have him follow up with cardiology in 1 month to discuss Watchman  MRI brain w/wo contrast with SWI (ordered).  Systolic BP Goal: 130-150. Adjustment of oral antihypertensives per hospitalist to achieve goal.\"  See further discussion, below  Neurosurgery consult requested, I appreciate Shiela Warren's evaluation recommendations  Per him, \"Patient is DNR/DNI, does not wish any intervention from neurosurgery, particular surgical intervention or an EVD. Neurosurgery will sign off.\"  per Stroke Neurology, change " "neuro checks to q4, \"OK for transfer out of ICU\"    Diabetes mellitus type II  H/o diabetic ketoacidosis  patient has insulin pump, do not plan to use in hospital  Check metered glucose 4 times daily before meals and at bedtime  High scale corrective dose insulin  Added basal insulin, ~0.1 unit per kg (12 units qam).  I reviewed his insulin pump settings with PharmD and this approximates his home basal insulin dose  Increase basal insulin  Add prandial dose insulin  Needs moderate carbohydrate diet with no sugar sweetened beverages for us to achieve even reasonable blood sugar control    Mild-moderate oral-pharyngeal dysphagia   SLP consult requested, I appreciate Delia Slater's evaluation and recommendaitons  Level 4 diet (pureed) MOD CARB with thin liquids, per SLP, with ongoing therapies    Likely acute on CKD stage III  UTI due to Klebsiella pneumoniae  *baseline creatinine around 1 to 1.1  *creatinine on admission 1.66  Continue Irbesartan  See comments in note from office visit with Lacie Tapia on 11/20/2023, \"Advised not to start Entresto given renal function, then due to further decline in renal function torsemide changed to PRN.\"  Daily BMP  UA with > 182 WBC/hpf, large leukocyte esterase.  Added Ceftriaxone.  Urine culture has greater than 100,000 CFU/mL Klebsiella pneumoniae, should be sensitive to ceftriaxone     Hypertension  diastolic CHF  Was on Nicardipine drip as above, this has been discontinued  Per Stroke Neurology, SBP goal is 130-150 mmHg.  It would likely be difficult to achieve readings within this very narrow goal range with oral antihypertensives (e.g., typically requires gtt to achieve consistent readings within 20 mmHg range)  Added Amlodipine  Resumed PTA carvedilol, avoid rebound tachycardia  Resumed Avapro 12/12, increase to PTA dose  hold PTA Demadex, cardiology advised changing to as needed dosing  As needed labetalol and hydralazine available; may ultimately need scheduled " "hydralazine     Anemia of chronic disease  hemoglobin 10.6; monitor hemoglobin         Diet: Combination Diet Pureed Diet (level 4); Mildly Thick (level 2) (1:1 assist/supervision, sit at 90 degrees, only when alert, small sips, no straw, verify/cue swallows, meds with puree until)    DVT Prophylaxis: Pneumatic Compression Devices  Pruett Catheter: Not present  Lines: None     Cardiac Monitoring: ACTIVE order. Indication: ICU  Code Status: No CPR- Do NOT Intubate      Clinically Significant Risk Factors                  # Hypertension: Noted on problem list  # Chronic heart failure with preserved ejection fraction: heart failure noted on problem list and last echo with EF >50%      # DMII: A1C = 8.1 % (Ref range: <5.7 %) within past 6 months       # Financial/Environmental Concerns: none         Disposition Plan     Expected Discharge Date: 12/18/2023      Destination: home with help/services;inpatient rehabilitation facility          Susu Rodriguez MD  Hospitalist Service  [facility]  Securely message with [employer] (more info)  Text page via [employer] Paging/Directory   ______________________________________________________________________    Interval History   \"I told the neurologist, I cannot be like this.\"  Tc says he is feeling much better.  He says he told the neurologist that he cannot continue having spells of dizziness and weakness, they assured him they are \"going to change some things around.\"  He denies chest pain, no respiratory complaints.  He is holding an emesis basin but denies nausea, \"it is my security blanket.\"    Physical Exam   Vital Signs: Temp: 98.8  F (37.1  C) Temp src: Temporal BP: (!) 153/88 Pulse: 80   Resp: 16 SpO2: 98 % O2 Device: Nasal cannula Oxygen Delivery: 1 LPM  Weight: 131 lbs 6.31 oz    Constitutional: Frail man, awake and alert  Respiratory: Clear to auscultation bilaterally  Cardiovascular: Irregularly irregular rhythm  GI: Normal bowel sounds, soft, " non-distended, non-tender  Skin/Integumen: He has numerous ecchymoses on the upper extremities consistent with recent lab draws.  No lower extremity edema  Other: Mood is optimistic      Medical Decision Making       45 MINUTES SPENT BY ME on the date of service doing chart review, history, exam, documentation & further activities per the note.      Data     I have personally reviewed the following data over the past 24 hrs:    18.5 (H)  \   9.7 (L)   / 251     141 106 31.6 (H) /  260 (H)   4.3 29 1.75 (H) \       Imaging results reviewed over the past 24 hrs:   Recent Results (from the past 24 hour(s))   CT Head w/o Contrast    Narrative    CT SCAN OF THE HEAD WITHOUT CONTRAST   12/14/2023 8:02 AM     HISTORY: Follow up IPH/IVH.    TECHNIQUE:  Axial images of the head and coronal reformations without  IV contrast material. Radiation dose for this scan was reduced using  automated exposure control, adjustment of the mA and/or kV according  to patient size, or iterative reconstruction technique.    COMPARISON: Head CT 12/13/2023      Impression    IMPRESSION:  No change in volume of left cerebellar intraparenchymal  hematoma with extension into the ventricular system. Stable volume of  intraventricular hemorrhage. Small volume subarachnoid hemorrhage  along the convexities appears slightly improved. Ventriculomegaly  appears slightly improved.    NAZANIN FERGUSON MD         SYSTEM ID:  [id number]

## 2023-12-14 NOTE — PROVIDER NOTIFICATION
"MD Notification    Notified Person: MD    Notified Person Name: Toni Smith    Notification Date/Time: 12/13/2023 2008    Notification Interaction: Vocalejandra    Purpose of Notification: \"Pt BP is 167/103- PRN Labetalol already given. Do you want to add Hydralazine? Also, he just coughed up a small amount of bright red sputum one time. Thanks\"   Orders Received: Clonidine 0.1mg PRN Q4h          "

## 2023-12-14 NOTE — PROGRESS NOTES
[facility]    Stroke Progress Note    Interval Events  CT this morning stable/improved.     Exam this morning stable/improved- more alert.     -167  Max temp 98.9  HR 68-95  WBC 11.9>29.6>30.2>18.5  Na 141  Cr 1.75  Glucose 185-472      HPI Summary  Tc Garcia is a 83 year old male with PMH DM2, paroxysmal atrial fibrillation (on Eliquis), HTN, HLD, pulmonary hypertension, prior ICH within the past year while on AC, CKD, anemia, presented with dizziness, diplopia, nausea, When he stood up he fell due to sudden onset dizziness and nausea. In CT noted to have R gaze preference.  CTH revealed IPH centered in the inferior cerebellar vermis with extension into the fourth ventricle and cerebral aqueduct, no current signs of hydrocephalus. CTA revealed no evidence of AVM. Presenting BP was 190/118. AC was reversed with Kcentra.     12/12-12/13 agitation/confusion - leukocytosis, UTI, CXR also with R>L  lower lung opacities suspicion for aspiration/infection. Abx started (ceftriaxone).      Stroke Evaluation Summarized     MRI/Head CT CT 12/11: IPH centered in the inferior cerebellar vermis with extension into the fourth ventricle and cerebral aqueduct      Repeat CT 12/11: increasing hydro     CTH: 12/12: stable IPH, IV extension, ventriculomegaly appears stable   Intracranial Vasculature CTA: mod renu P2 stenosis   Cervical Vasculature CTA: renu cervical ICA athero without significant stenosi      Echocardiogram EF 60-65%, severe biatrial enlargement, mild to mod TR   EKG/Telemetry afib   Other Testing Not Applicable      LDL  12/12/2023: 54 mg/dL   A1C  12/12/2023: 8.1 %   Troponin No lab value available in past 48 hrs       Impression   IPH centered in the inferior cerebellar vermis with extension into the fourth ventricle and cerebral aqueduct, etiology likely hypertensive and in the setting of chronic anti-coagulation (on Eliquis) vs traumatic after sudden onset of dizziness and  "fall  Leukocytosis - improving since starting antibiotics for UTI, also had abnormal CXR with R>L  lower lung opacities suspicion for aspiration/infection  Hyperglycemia         Plan  - Q4 hours neuro checks and vitals. OK For transfer out of ICU from a stroke neuro standpoint.  - Systolic BP Goal: 130-150. Adjustment of oral antihypertensives per hospitalist to achieve goal.  - HOLD anticoagulation. Not an ASPIRE candidate given ICH within the past year. Plan to have him follow up with cardiology in 1 month to discuss Watchman. Discussed with patient and daughter and they are in agreement with this plan.  - MRI brain w/wo contrast with SWI (ordered)  - Keep Head of bed elevated  - Telemetry, EKG  - Bedside Glucose Monitoring. Needs improved blood glucose management, goal <180. Management per hospitalist.  - PT/OT/SLP   - Stroke Education  - Euthermia, Euglycemia, Normonatremia  - Appreciate neurosurgery following  - Antibiotics/infection management per primary team        Patient Follow-up    - final recommendation pending work-up  - in 6-8 weeks with Melia Schrader PA-C ([phone]) (ordered)  - Follow up with cardiology in 1 month for consideration of Watchman given recurrent ICH    We will continue to follow.     Melia Schrader PA-C  Vascular Neurology    To page me or covering stroke neurology team member, click here: [facility]  Choose \"On Call\" tab at top, then select \"NEUROLOGY/ALL SITES\" from middle drop-down box, press Enter, then look for \"stroke\" or \"telestroke\" for your site.  ______________________________________________________    Clinically Significant Risk Factors                  # Hypertension: Noted on problem list  # Chronic heart failure with preserved ejection fraction: heart failure noted on problem list and last echo with EF >50%      # DMII: A1C = 8.1 % (Ref range: <5.7 %) within past 6 months       # Financial/Environmental Concerns: none           Medications   Scheduled Meds   amLODIPine  5 mg " Oral Daily    carvedilol  12.5 mg Oral BID w/meals    cefTRIAXone  1 g Intravenous Q24H    insulin aspart  1-10 Units Subcutaneous TID AC    insulin aspart  1-7 Units Subcutaneous At Bedtime    insulin glargine  12 Units Subcutaneous QAM AC    irbesartan  300 mg Oral At Bedtime       Infusion Meds   - MEDICATION INSTRUCTIONS -      niCARdipine Stopped (12/13/23 1038)    sodium chloride 50 mL/hr (12/13/23 2000)       PRN Meds  acetaminophen, cloNIDine, glucose **OR** dextrose **OR** glucagon, labetalol, - MEDICATION INSTRUCTIONS -, ondansetron **OR** ondansetron, prochlorperazine **OR** prochlorperazine **OR** prochlorperazine, sodium chloride 0.9%       PHYSICAL EXAMINATION  Temp:  [98.5  F (36.9  C)-98.9  F (37.2  C)] 98.5  F (36.9  C)  Pulse:  [68-95] 77  Resp:  [12-53] 21  BP: (110-167)/() 147/83  SpO2:  [92 %-100 %] 98 %      General Exam  General:   lying in bed, no acute distress    HEENT:  normocephalic/atraumatic  Pulmonary:  no respiratory distress     Neuro Exam  Mental Status:  awake, oriented to hospital/month/person/situation, follows commands, speech mildly dysarthric  Cranial Nerves:  visual fields intact, R gaze preference but can cross midline to L, limited upgaze, vertical nystagmus, ?L 6th nerve palsy, facial sensation intact and symmetric, mild L facial droop, hearing not formally tested but intact to conversation, dysarthric  Motor:  normal muscle tone and bulk, no abnormal movements, able to move all limbs spontaneously, mild weakness LUE/LLE without drift today  Sensory:  intact to touch  Coordination:   dysmetria with finger to nose and heel to shin on the L  Station/Gait:  deferred    Stroke Scales    National Institutes [employer] Health Stroke Scale   Score    Level of consciousness: (0)   Alert, keenly responsive    LOC questions: (0)   Answers both questions correctly    LOC commands: (0)   Performs both tasks correctly    Best gaze: (1)   Partial gaze palsy    Visual: (0)   No visual loss  "   Facial palsy: (1)   Minor paralysis (flat nasolabial fold, smile asymmetry)    Motor arm (left): (0)   No drift    Motor arm (right): (0)   No drift    Motor leg (left): (0)   No drift    Motor leg (right): (0)   No drift    Limb ataxia: (2)   Present in two limbs    Sensory: (0)   Normal- no sensory loss    Best language: (0)   Normal- no aphasia    Dysarthria: (1)   Mild to moderate dysarthria    Extinction and inattention: (0)   No abnormality        Total Score:  5         Imaging  I personally reviewed all imaging; relevant findings per HPI.     Lab Results Data   CBC  Recent Labs   Lab 12/14/23  0524 12/12/23  2223 12/12/23  0543   WBC 18.5* 30.2* 29.6*   RBC 3.62* 3.75* 3.92*   HGB 9.7* 10.1* 10.6*   HCT 31.2* 32.3* 33.3*    251 291     Basic Metabolic Panel    Recent Labs   Lab 12/14/23  0530 12/14/23  0524 12/14/23  0215 12/13/23  0321 12/12/23 2223 12/12/23  0801 12/12/23  0543   NA  --  141  --   --  137  --  139   POTASSIUM  --  4.3  --   --  4.6  --  4.4   CHLORIDE  --  106  --   --  100  --  101   CO2  --  29  --   --  25  --  27   BUN  --  31.6*  --   --  22.4  --  20.1   CR  --  1.75*  --   --  1.63*  --  1.66*   * 233* 185*   < > 375*   < > 258*   LLOYD  --  9.2  --   --  9.0  --  9.4    < > = values in this interval not displayed.     Liver Panel  No results for input(s): \"PROTTOTAL\", \"ALBUMIN\", \"BILITOTAL\", \"ALKPHOS\", \"AST\", \"ALT\", \"BILIDIRECT\" in the last 168 hours.  INR    Recent Labs   Lab Test 12/11/23  1419 01/15/23  1321 11/21/20  1146   INR 1.35* 1.13 1.50*      Lipid Profile    Recent Labs   Lab Test 12/12/23  0543 01/17/23  0436 05/13/20  0553   CHOL 137 112 116   HDL 68 50 60   LDL 54 52 43   TRIG 76 51 65     A1C    Recent Labs   Lab Test 12/12/23  0543 04/20/23  1804 01/17/23  0436   A1C 8.1* 8.0* 7.2*     Troponin    Recent Labs   Lab 12/11/23  1556 12/11/23  1419   CTROPT 30* 34*          Data Billing:  [id number]: I personally examined and evaluated the " patient today. At the time of my evaluation and management the patient was critical condition today due to ICH/IVH. I personally managed chart and imaging review, exam, discussion with patient and family. Key decisions made today included OK for transfer out of ICU, refer for Watchman. I spent a total of 50 minutes providing critical care services, evaluating the patient, directing care and reviewing laboratory values and radiologic reports. Greater than 50% was spent in counseling and coordination of care

## 2023-12-14 NOTE — PROGRESS NOTES
"   12/14/23 1441   Appointment Info   Signing Clinician's Name / Credentials (OT) VERNA Sanchez   Student Supervision Direct Patient Contact Provided;Therapy services provided with the co-signing licensed therapist guiding and directing the services, and providing the skilled judgement and assessment throughout the session   Living Environment   People in Home spouse   Current Living Arrangements house   Home Accessibility not wheelchair accessible;stairs to enter home;stairs within home;other (see comments)  (Has chair lift on stairs w/in home on both flights)   Number of Stairs, Main Entrance 3   Stair Railings, Main Entrance railings safe and in good condition   Number of Stairs, Within Home, Primary greater than 10 stairs   Transportation Anticipated family or friend will provide   Living Environment Comments Pt lives with wife in house, has walk in shower/tub w shower chair, has chair lift for stairs in home, no AD use at baseline but owns walker   Self-Care   Usual Activity Tolerance good   Current Activity Tolerance fair   Regular Exercise No   Equipment Currently Used at Home shower chair   Fall history within last six months yes   Number of times patient has fallen within last six months 1   Activity/Exercise/Self-Care Comment Pt previously Ind w all ADLs at baseline, does not use walker at baseline   Instrumental Activities of Daily Living (IADL)   Previous Responsibilities meal prep;housekeeping;medication management;shopping;driving   IADL Comments Pt previously Ind at baseline w IADLs per pt report, has assist from wife as well.   General Information   Onset of Illness/Injury or Date of Surgery 12/12/23   Referring Physician Susu Rodriguez MD   Additional Occupational Profile Info/Pertinent History of Current Problem \"Tc Garcia is a 83 year old male with PMH DM2, paroxysmal atrial fibrillation (on Eliquis), HTN, HLD, pulmonary hypertension, prior ICH within the past year while on AC, CKD, " "anemia, presented with dizziness, diplopia, nausea, When he stood up he fell due to sudden onset dizziness and nausea. In CT noted to have R gaze preference.  CTH revealed IPH centered in the inferior cerebellar vermis with extension into the fourth ventricle and cerebral aqueduct, no current signs of hydrocephalus. CTA revealed no evidence of AVM. 12/12-12/13 agitation/confusion - leukocytosis, UTI, CXR also with R>L  lower lung opacities suspicion for aspiration/infection. Abx started (ceftriaxone).\" HCT today: \"No change in volume of left cerebellar intraparenchymal  hematoma with extension into the ventricular system. Stable volume of  intraventricular hemorrhage. Small volume subarachnoid hemorrhage  along the convexities appears slightly improved. Ventriculomegaly  appears slightly improved   Existing Precautions/Restrictions fall  (SBP <160)   Cognitive Status Examination   Orientation Status orientation to person, place and time   Follows Commands follows one-step commands;75-90% accuracy   Cognitive Status Comments Pt alert and oriented   Cognitive Screens/Assessments   Cognitive Assessments Completed Blessed Orientation-Memory-Concentration   Blessed Orientation-Memory-Concentration Test:  Total Weighted Score out of 28 4   Blessed Orientation-Memory-Concentration Test Norms 0-8 equals normal to mild impairment   Blessed Orientation-Memory-Concentration Interpretation Pt completed Short Blessed screen. Pt reported feeling confused but scored a 4, indicating mild if any cognitive impairment (needed cue for months in reverse and missed one part of address). Will continue to monitor.   Visual Perception   Visual Impairment/Limitations corrective lenses for reading   Impact of Vision Impairment on Function (Vision) Pt reports ongoing double vision and blurry vision since 12/11, was able to read staff nametag but not white board, seems to be worse when looking at objects further away. WIll continue to monitor. "   Sensory   Sensory Quick Adds sensation intact   Sensory Comments Pt reports neuropathy in both feet at baseline   Pain Assessment   Patient Currently in Pain No   Posture   Posture kyphosis   Range of Motion Comprehensive   General Range of Motion no range of motion deficits identified   Strength Comprehensive (MMT)   General Manual Muscle Testing (MMT) Assessment no strength deficits identified   Coordination   Upper Extremity Coordination Left UE impaired;Right UE impaired   Coordination Comments Pt completed serial opposition but had difficulty with tip to tip, potentially due to vision impairment   Bed Mobility   Bed Mobility supine-sit   Supine-Sit Woden (Bed Mobility) moderate assist (50% patient effort);verbal cues   Transfers   Transfers sit-stand transfer   Sit-Stand Transfer   Sit-Stand Woden (Transfers) minimum assist (75% patient effort)   Assistive Device (Sit-Stand Transfers) walker, front-wheeled   Balance   Balance Assessment standing dynamic balance   Balance Comments both posterior and left lean noted and needed assist to correct   Activities of Daily Living   BADL Assessment/Intervention lower body dressing;grooming;upper body dressing;toileting   Upper Body Dressing Assessment/Training   Comment, (Upper Body Dressing) per clinical judgement   Woden Level (Upper Body Dressing) minimum assist (75% patient effort)   Lower Body Dressing Assessment/Training   Comment, (Lower Body Dressing) was not able to complete second foot due to fatigue, needed A w first foot   Woden Level (Lower Body Dressing) maximum assist (25% patient effort)   Grooming Assessment/Training   Woden Level (Grooming) minimum assist (75% patient effort)   Comment, (Grooming) per clinical judgement   Toileting   Comment, (Toileting) per clinical judgement   Woden Level (Toileting) moderate assist (50% patient effort)   Clinical Impression   Criteria for Skilled Therapeutic Interventions  Met (OT) Yes, treatment indicated   OT Diagnosis dec Ind w I/ADLs   OT Problem List-Impairments impacting ADL problems related to;activity tolerance impaired;balance;mobility;sensation;vision;postural control   Assessment of Occupational Performance 3-5 Performance Deficits   Identified Performance Deficits lower body dressing, functional mobility, bathing, toileting   Planned Therapy Interventions (OT) ADL retraining;IADL retraining;strengthening;visual perception;transfer training;home program guidelines;progressive activity/exercise;risk factor education;fine motor coordination training;neuromuscular re-education   Clinical Decision Making Complexity (OT) detailed assessment/moderate complexity   Risk & Benefits of therapy have been explained evaluation/treatment results reviewed;care plan/treatment goals reviewed;risks/benefits reviewed;current/potential barriers reviewed;participants voiced agreement with care plan;participants included;patient   OT Total Evaluation Time   OT Eval, Moderate Complexity Minutes (18766) 10   OT Goals   Therapy Frequency (OT) 6 times/week   OT Predicted Duration/Target Date for Goal Attainment 12/28/23   OT Goals Hygiene/Grooming;Upper Body Dressing;Lower Body Dressing;Upper Body Bathing;Lower Body Bathing;Transfers;Toilet Transfer/Toileting   OT: Hygiene/Grooming modified independent;using adaptive equipment;while standing   OT: Upper Body Dressing Modified independent   OT: Lower Body Dressing Modified independent   OT: Upper Body Bathing using adaptive equipment;Modified independent   OT: Lower Body Bathing using adaptive equipment;Modified independent   OT: Transfer Modified independent;with assistive device   OT: Toilet Transfer/Toileting Modified independent;toilet transfer;cleaning and garment management;using adaptive equipment   Interventions   Interventions Quick Adds Therapeutic Activity   Therapeutic Activities   Therapeutic Activity Minutes (04594) 20   Symptoms noted  during/after treatment fatigue   Treatment Detail/Skilled Intervention Pt greeted awake and alert in bed upon arrival, pt agreeable to therapy. AOx4, following commands but fatigued. Pt supine<>sit EOB ModA, pt required cues for posture (heavily leaning L at EOB). Pt sit<>stand MinAx2 w FWW. Pt  marched in place in preparation for ambulation - Anibal w FWW, pt needed frequent cues for posture (leaning L and posteriorly). Pt pivoted to chair w FWW ModAx2, VC needed. Pt stand<>sit w FWW Anibal. Pt rested for a moment and then sit<>stand w FWW Anibal. Pt ambulated ~5 steps forwards and backwards ModA w FWW, pt cued to use walker safely and for best support throughout. Pt stand<>sit Anibal. Pt completed Short Blessed Screen, see above for results. Pt doffed socks SBA and donned one sock ModA and the second sock Total A d/t fatigue. Pt left in chair w all needs met, chair alarm on, items in reach. VSS throughout, /79 after activity.   OT Discharge Planning   OT Plan progress balance w/ standing ADL tasks, dressing, monitor vision   OT Discharge Recommendation (DC Rec) [facility]-Mot[facility] patient will benefit from intensive, interdisciplinary therapy.  Anticipate will be able to tolerate 3 hours of therapy per day   OT Rationale for DC Rec Pt limited by mobility and vision changes s/p brain hemorrhage. Pt reports being Ind at baseline w ADLs and most IADLs w/o AD and is currently needing ModAx2 to ambulate w FWW. Recommend ARU at d/c to progress I/ADL independence prior to returning home. Pt motivated, has good support from spouse at home.   OT Brief overview of current status Anibal sit<>stand, ModAx2 ambulating w FWW, MaxA lower body dressing   Total Session Time   Timed Code Treatment Minutes 20   Total Session Time (sum of timed and untimed services) 30

## 2023-12-14 NOTE — PROGRESS NOTES
"VIDEO SWALLOW STUDY     12/14/23 1121   Appointment Info   Signing Clinician's Name / Credentials (SLP) Ronda Acosta [facility]   General Information   Onset of Illness/Injury or Date of Surgery 12/11/23   Referring Physician Susu Rodriguez MD   Patient/Family Therapy Goal Statement (SLP) Patient would like water.   Pertinent History of Current Problem Per neurology note: \"Tc Garcia is a 83 year old male with PMH DM2, paroxysmal atrial fibrillation (on Eliquis), HTN, HLD, pulmonary hypertension, prior ICH within the past year while on AC, CKD, anemia, presented with dizziness, diplopia, nausea, When he stood up he fell due to sudden onset dizziness and nausea. In CT noted to have R gaze preference.  CTH revealed IPH centered in the inferior cerebellar vermis with extension into the fourth ventricle and cerebral aqueduct, no current signs of hydrocephalus. CTA revealed no evidence of AVM. 12/12-12/13 agitation/confusion - leukocytosis, UTI, CXR also with R>L  lower lung opacities suspicion for aspiration/infection. Abx started (ceftriaxone).\" HCT today: \"No change in volume of left cerebellar intraparenchymal  hematoma with extension into the ventricular system. Stable volume of  intraventricular hemorrhage. Small volume subarachnoid hemorrhage  along the convexities appears slightly improved. Ventriculomegaly  appears slightly improved.\"   General Observations Patient alert, cooperative. Patient referred for instrumental swallow exam due to inconsistent aspiration signs at bedside and CXR findings. Note kyphotic position.   Type of Evaluation   Type of Evaluation Swallow Evaluation   General Swallowing Observations   Past History of Dysphagia No dysphagia hx found during chart review.   Respiratory Support nasal cannula  (1L)   Current Diet/Method of Nutritional Intake (General Swallowing Observations, NIS) pureed (dysphagia pureed) (level 4);mildly thick (nectar-thick) liquids (level 2)   Swallowing " Evaluation Videofluoroscopic swallow study (VFSS)   VFSS Evaluation   Radiologist Dr. Lanier   Views Taken left lateral   Physical Location of Procedure [facility] radiology   VFSS Textures Trialed thin liquids;slightly thick liquids;mildly thick liquids;pureed   VFSS Eval: Thin Liquid Texture Trial   Mode of Presentation, Thin Liquid spoon;cup;straw;fed by clinician;self-fed   Order of Presentation image 3 (spoon); 4, 5, 9 (cup); 7-8 (straw)   Preparatory Phase poor bolus control  (piecemeal deglutition)   Oral Phase, Thin Liquid premature pharyngeal entry;impaired AP movement;residue in oral cavity  (with larger straw sip)   Bolus Location When Swallow Triggered posterior angle of ramus;pyriforms  (pyriform sinuses with straw sip)   Pharyngeal Phase, Thin Liquid impaired hyolaryngeal excursion;impaired tongue base retraction;pharyngeal wall coating;residue in vallecula;residue in pyriform sinus;impaired pharyngoesophageal segment opening  (trace pharyngeal residue)   Rosenbek's Penetration Aspiration Scale: Thin Liquid Trial Results 3 - contrast remains above the vocal cords, visible residue remains (penetration)  (x1 with larger sip by straw before the swallow)   Diagnostic Statement Most penetrated material ejects laryngeal vestibule with swallows; trace remaining residue in laryngeal vestibule. Cued cough appeared to clear penetrated material from laryngeal vestibule. No aspiration or laryngeal penetration with small sips by spoon or cup.   VFSS Eval: Slightly Thick Liquids   Mode of Presentation spoon;fed by clinician   Order of Presentation image 2   Preparatory Phase poor bolus control   Oral Phase impaired AP movement;residue in oral cavity   Bolus Location When Swallow Triggered posterior angle of ramus   Pharyngeal Phase impaired hyolaryngel excursion;impaired tongue base retraction;pharyngeal wall coating;residue in vallecula;residue in pyriform sinus;impaired pharyngoesophageal  segment opening  (trace pharyngeal residue; minimal vallecular)   Rosenbek's Penetration Aspiration Scale 1 - no aspiration, contrast does not enter airway   VFSS Eval: Mildly Thick Liquids   Mode of Presentation spoon;fed by clinician   Order of Presentation image 1   Preparatory Phase poor bolus control   Oral Phase impaired AP movement;residue in oral cavity   Bolus Location When Swallow Triggered posterior angle of ramus   Pharyngeal Phase impaired hyolaryngel excursion;impaired epiglottic movement;impaired tongue base retraction;pharyngeal wall coating;residue in vallecula;residue in pyriform sinus;impaired pharyngoesophageal segment opening  (mild vallecular residue)   Rosenbek's Penetration Aspiration Scale 1 - no aspiration, contrast does not enter airway   Diagnostic Statement Cue for second swallow cleared vallecular residue to minimal amount.   VFSS Evaluation: Puree Solid Texture Trial   Mode of Presentation, Puree spoon;fed by clinician   Order of Presentation image 6   Preparatory Phase poor bolus control;prolonged bolus preparation   Oral Phase, Puree premature pharyngeal entry;impaired AP movement   Bolus Location When Swallow Triggered posterior angle of ramus  (to BOT)   Pharyngeal Phase, Puree impaired hyolaryngel excursion;impaired tongue base retraction;pharyngeal wall coating;residue in vallecula;residue in pyriform sinus;impaired pharyngoesophageal segment opening  (trace pharyngeal residue)   Rosenbek's Penetration Aspiration Scale: Puree Food Trial Results 1 - no aspiration, contrast does not enter airway   Esophageal Phase of Swallow   Esophageal sweep performed during today s vidofluoroscopic exam  No   Esophageal comments Did not appear indicated. Positioning also difficult given kyphosis.   Swallowing Recommendations   Diet Consistency Recommendations pureed (level 4);thin liquids (level 0)   Supervision Level for Intake 1:1 supervision needed   Mode of Delivery Recommendations bolus  size, small;no straws;slow rate of intake  (use small cup)   Monitoring/Assistance Required (Eating/Swallowing) stop eating activities when fatigue is present;monitor for cough or change in vocal quality with intake   Recommended Feeding/Eating Techniques (Swallow Eval) encourage use of dentures;maintain upright posture during/after eating for 30 minutes;minimize distractions during oral intake;set-up and prepare tray   Medication Administration Recommendations, Swallowing (SLP) One at a time   Instrumental Assessment Recommendations instrumental evaluation not recommended at this time   Clinical Impression   Criteria for Skilled Therapeutic Interventions Met (SLP Eval) Yes, treatment indicated   SLP Diagnosis Mild-moderate oropharyngeal dysphagia   Clinical Impression Comments Mild-moderate oropharyngeal dysphagia under fluoroscopy today in setting of left cerebellar IPH. Deficits include reduced oral bolus control and AP bolus transit, delayed swallow response (with larger bolus size by straw), reduced BOT retraction, reduced hyolaryngeal excursion, reduced pharyngeal constriction and UES dysfunction. These deficits resulted in deep laryngeal penetration of thin liquid (by straw) before the swallow due to delayed response. Most of  penetrated material was ejected from laryngeal vestibule with swallows, although trace residue remained in laryngeal vestibule after swallow. This material appeared to clear given cued cough. No laryngeal penetration or aspiration occurred with small single sips thin liquid by cup or spoon, with slightly thick, mildly thick or puree consistency. Recommend advance liquids to thin consistency (IDDSI 0) and continue puree textures (IDDSI 4) given 1:1 supervision and swallow straetgies (fully upright position during and 30-60 minutes after PO intake, small single sips/bites, slow pace, NO STRAWS, periodically cough/clear throat on purpose). Please serve pills one at a time. Please use small  "size cup (not large cup stocked on unit). Monitor for signs/symptoms of aspiration and hold PO if occur.   SLP Total Evaluation Time   Evaluation, videofluoroscopic eval of swallow function Minutes (16347) 10   Swallowing Dysfunction &/or Oral Function for Feeding   Treatment of Swallowing Dysfunction &/or Oral Function for Feeding Minutes (29953) 8   Symptoms Noted During/After Treatment None;Fatigue   Treatment Detail/Skilled Intervention Provided education about risk for aspiration of thin liquids when taking larger sips by straw. Provided education about diet modifications and swallow strategies. Patient later able to state need for swallowing  \"in small parts\" but too fatigued to continue education in room. Patient will need ongoing education/reinforcement.   SLP Discharge Planning   SLP Plan Diet f/u (advanced liquids to thin 12/14); trials advanced textures once dentures brought in from home; swallow strategy training; swallow exercises   SLP Discharge Recommendation Transitional Care Facility   SLP Rationale for DC Rec Cognitive-linguistic and swallow function appear to be below baseline;  supervision after discharge if safety concerns are present   SLP Brief overview of current status  Recommend advance liquids to thin consistency (IDDSI 0) and continue puree textures (IDDSI 4) given 1:1 supervision and swallow straetgies (fully upright position and 30-60 minutes after PO intake, small single sips/bites, slow pace, NO STRAWS, periodically cough/clear throat on purpose). Please serve pills one at a time. Please use small size cup (not large cup stocked on unit). Monitor for signs/symptoms of aspiration and hold PO if occur.   Total Session Time   Total Session Time (sum of timed and untimed services) 18     "

## 2023-12-15 NOTE — PLAN OF CARE
Problem: Adult Inpatient Plan of Care  Goal: Plan of Care Review  Description: The Plan of Care Review/Shift note should be completed every shift.  The Outcome Evaluation is a brief statement about your assessment that the patient is improving, declining, or no change.  This information will be displayed automatically on your shift  note.  Outcome: Progressing  Flowsheets (Taken 12/15/2023 0570)  Outcome Evaluation: Pt alert and oriented x4. Able to use the call light and make needs known approprietly. Able to sleep inbetween cares. Hospitalist notfied regarding SBP > 150 after PRNs utililzed. MD ordered 10mg Hydralizine IV push x1, given with good effect. No BM overnight, active bowel sounds, passing flatus. Awaiting bed for transfer. Continue with pt plan of care.   Goal Outcome Evaluation:                 Outcome Evaluation: Pt alert and oriented x4. Able to use the call light and make needs known approprietly. Able to sleep inbetween cares. Hospitalist notfied regarding SBP > 150 after PRNs utililzed. MD ordered 10mg Hydralizine IV push x1, given with good effect. No BM overnight, active bowel sounds, passing flatus. Awaiting bed for transfer. Continue with pt plan of care.

## 2023-12-15 NOTE — PLAN OF CARE
Goal Outcome Evaluation:  A&Ox4 during the day, forgetful. Slight L facial droop. LUE ataxia/weakness unchanged. Follows commands, DOWNS. Wears 1L NC O2 when asleep. A fib controlled. DD4, thin liquids tolerated well. Primofit applied. Plan to tx to [id number]. Family updated via phone.  Problem: Adult Inpatient Plan of Care  Goal: Readiness for Transition of Care  Outcome: Progressing     Problem: Stroke, Intracerebral Hemorrhage  Goal: Optimal Coping  Outcome: Progressing  Goal: Safe and Effective Swallow  Outcome: Progressing  Intervention: Optimize Eating and Swallowing  Recent Flowsheet Documentation  Taken 12/14/2023 1600 by Cheryl Villa RN  Swallowing Interventions: Dysphagia:   upright position maintained 30 mins after intake   upright position maintained 45 mins after intake   straw use restricted  Feeding/Eating Techniques:   distant supervision provided   rest periods provided  Taken 12/14/2023 1200 by Cheryl Villa RN  Swallowing Interventions: Dysphagia:   upright position maintained 30 mins after intake   upright position maintained 45 mins after intake   straw use restricted  Feeding/Eating Techniques:   distant supervision provided   rest periods provided  Taken 12/14/2023 0800 by Cheryl Villa RN  Swallowing Interventions: Dysphagia:   upright position maintained 30 mins after intake   upright position maintained 45 mins after intake   straw use restricted  Feeding/Eating Techniques:   distant supervision provided   rest periods provided     Problem: Glycemic Control Impaired  Goal: Blood Glucose Level Within Targeted Range  Outcome: Progressing  Intervention: Optimize Glycemic Control  Recent Flowsheet Documentation  Taken 12/14/2023 1600 by Cheryl Villa RN  Hyperglycemia Management: blood glucose monitored  Taken 12/14/2023 1200 by Cheryl Villa RN  Hyperglycemia Management: blood glucose monitored  Taken 12/14/2023 0800 by Cheryl Villa RN  Hyperglycemia Management: blood glucose  monitored

## 2023-12-15 NOTE — PROGRESS NOTES
Patient transferred to station 73, room 710. Report given to RN, family also notified of transfer.

## 2023-12-15 NOTE — PROGRESS NOTES
"[facility]    Medicine Progress Note - Hospitalist Service    Date of Admission:  12/11/2023    Assessment & Plan   Tc Garcia is a 83 year old male with PMH significant for diabetes mellitus type 2 (on insulin pump, h/o DKA), paroxysmal atrial fibrillation (on Eliquis), hypertension, hyperlipidemia, pulmonary hypertension, h/o spontaneous intracranial hemorrhage, recurrent pleural effusion, chronic kidney disease stage 3, BPH and anemia of chronic disease who presented to the ER with dizziness and fall with head injury . Noted with intracranial hemorrhage .     Intraparenchymal hematoma  H/o spontaneous intracranial hemorrhage (1/2023)  paroxysmal atrial fibrillation (on eliquis)  *HCT with 1.5 X 3 cm intraparenchymal hematoma centered in inferior cerebellar vermis with likely extension into fourth ventricle; no hydrocephalus  *CTA head and neck noted some atherosclerotic plaque with questionable active contrast extravasation into the hematoma  *CT cervical spine noted with compression fracture deformities C7 with slight further loss of height since comparison study and noted with mild degenerative changes     anticoagulation was reversed with Kcentra in ED  Admitted to ICU for close monitoring  Stroke neurology consult requested, I appreciate Melia Schrader's evaluation and recommendations  Per her, \"HOLD anticoagulation. Not an ASPIRE candidate given ICH within the past year. Plan to have him follow up with cardiology in 1 month to discuss Watchman  MRI brain w/wo contrast with SWI (ordered).  Systolic BP Goal: 130-150. Adjustment of oral antihypertensives per hospitalist to achieve goal.\"  See further discussion, below  Neurosurgery consult requested, I appreciate Shiela Warren's evaluation recommendations  Per him, \"Patient is DNR/DNI, does not wish any intervention from neurosurgery, particular surgical intervention or an EVD. Neurosurgery will sign off.\"  per Stroke Neurology, change " "neuro checks to q4, \"OK for transfer out of ICU\"    Diabetes mellitus type II  H/o diabetic ketoacidosis  patient has insulin pump, do not plan to use in hospital  Check metered glucose 4 times daily before meals and at bedtime  High scale corrective dose insulin  Added basal insulin, ~0.1 unit per kg (12 units qam).  I reviewed his insulin pump settings with PharmD and this approximates his home basal insulin dose  Increase basal insulin, again.  He is now receiving slightly more basal insulin than at home.  Added prandial dose insulin, increase dose to replicate pump settings  Needs moderate carbohydrate diet with no sugar sweetened beverages for us to achieve even reasonable blood sugar control    Mild-moderate oral-pharyngeal dysphagia   SLP consult requested, I appreciate Delia Slater's evaluation and recommendaitons  Level 4 diet (pureed) MOD CARB with thin liquids, per SLP, with ongoing therapies    Likely acute on CKD stage III  UTI due to Klebsiella pneumoniae  *baseline creatinine around 1 to 1.1  *creatinine on admission 1.66  Continue Irbesartan  See comments in note from office visit with Lacie Tapia on 11/20/2023, \"Advised not to start Entresto given renal function, then due to further decline in renal function torsemide changed to PRN.\"  Daily BMP  UA with > 182 WBC/hpf, large leukocyte esterase.  Added Ceftriaxone.  Urine culture has greater than 100,000 CFU/mL Klebsiella pneumoniae, should be sensitive to ceftriaxone     Hypertension  diastolic CHF  Was on Nicardipine drip as above, this has been discontinued  Per Stroke Neurology, SBP goal is 130-150 mmHg.  It would likely be difficult to achieve readings within this very narrow goal range with oral antihypertensives (e.g., typically requires gtt to achieve consistent readings within 20 mmHg range)  Added Amlodipine  Resumed PTA carvedilol, avoid rebound tachycardia  Resumed Avapro 12/12, increase to PTA dose  hold PTA Demadex, cardiology " "advised changing to as needed dosing  As needed labetalol and hydralazine available; may ultimately need scheduled hydralazine     Anemia of chronic disease  hemoglobin 10.6; monitor hemoglobin         Diet: Combination Diet Moderate Consistent Carb (60 g CHO per Meal) Diet; Pureed Diet (level 4); Thin Liquids (level 0) (1:1 assist/supervision, fully upright position, only when alert, small single sips, no straw, verify/cue swallows)    DVT Prophylaxis: Pneumatic Compression Devices  Pruett Catheter: Not present  Lines: None     Cardiac Monitoring: ACTIVE order. Indication: ICU  Code Status: No CPR- Do NOT Intubate      Clinically Significant Risk Factors                  # Hypertension: Noted on problem list  # Chronic heart failure with preserved ejection fraction: heart failure noted on problem list and last echo with EF >50%      # DMII: A1C = 8.1 % (Ref range: <5.7 %) within past 6 months       # Financial/Environmental Concerns: none         Disposition Plan      Expected Discharge Date: 12/18/2023      Destination: home with help/services;inpatient rehabilitation facility          Susu Rodriguez MD  Hospitalist Service  [facility]  Securely message with [employer] (more info)  Text page via [facility] Paging/Directory   ______________________________________________________________________    Interval History   \"It is like the [facility] up here.\"  Tc was moved from third floor to eighth floor, he is pleased with his new room.  He says his stomach was upset and he vomited a small amount before lunch today, but then was able to eat lunch without difficulty.  He denies headache, no respiratory complaints.  He has been up and walked a few feet, with assistance.    Physical Exam   Vital Signs: Temp: 98.4  F (36.9  C) Temp src: Temporal BP: (!) 168/103 Pulse: 81   Resp: 25 SpO2: 97 % O2 Device: None (Room air) Oxygen Delivery: 1 LPM  Weight: 132 lbs 7.94 oz    Constitutional: Frail man, awake " and alert  Respiratory: Clear to auscultation bilaterally  Cardiovascular: Irregularly irregular rhythm  GI: Normal bowel sounds, soft, non-distended, non-tender  Skin/Integumen: He has numerous ecchymoses on the upper extremities consistent with recent lab draws.  No lower extremity edema  Other: Mood is pleasant      Medical Decision Making       35 MINUTES SPENT BY ME on the date of service doing chart review, history, exam, documentation & further activities per the note.      Data     I have personally reviewed the following data over the past 24 hrs:    15.6 (H)  \   10.2 (L)   / 219     140 103 30.5 (H) /  292 (H)   3.9 30 (H) 1.62 (H) \       Imaging results reviewed over the past 24 hrs:   Recent Results (from the past 24 hour(s))   XR Video Swallow with SLP or OT    Narrative    VIDEO SWALLOWING EVALUATION   12/14/2023 11:15 AM     HISTORY: further assess oropharyngeal swallow function    COMPARISON: None.    FLUOROSCOPY TIME: 1.5 minutes.  SPOT IMAGES OR CINE RUNS: 9      Impression    IMPRESSION:    Thin: Penetration while drinking with a straw. No aspiration.    Slightly thick: No penetration or aspiration.    Mildly thick: No penetration or aspiration.    Moderately thick: Not administered.    Puree: No penetration or aspiration.    Semi-Solid: Not administered.    Regular: Not administered.    This study only includes the cervical esophagus. See separate speech  pathology report for additional findings.    DAYANNA CANNON MD         SYSTEM ID:  [id number]   MR Brain w/o & w Contrast    Narrative    EXAM: MR BRAIN W/O and W CONTRAST  LOCATION: [facility]  DATE: 12/14/2023    INDICATION: ICH, include SWI images  COMPARISON: CT head 12/14/2023  CONTRAST: 6 mL Gadavist  TECHNIQUE: Routine multiplanar multisequence head MRI without and with intravenous contrast.    FINDINGS:  INTRACRANIAL CONTENTS:     When accounting for differences in modality, there is unchanged size and  appearance of the left cerebellar intraparenchymal hematoma with intraventricular extension. There is unchanged surrounding edema. Better appreciated on the current more sensitive   susceptibility weighted imaging, there is hemosiderin staining along the visualized cervicomedullary junction and within the cerebellar folia compared to the 04/25/2023 study.    Small volume subarachnoid hemorrhage along the bilateral cerebral sulci are overall similar to the 12/14/2023 CT head study.     There are punctate foci of susceptibility within the bilateral cerebellar hemispheres, the bilateral medial and posterior temporal lobes, and the periventricular white matter. These have slightly increased in number compared to 04/25/2023.    There is a 0.4 x 0.3 cm area of diffusion restriction along the medial left frontal lobe (series 6, image 66).     Unchanged size and appearance of the ventricles.    SELLA: No abnormality accounting for technique.    OSSEOUS STRUCTURES/SOFT TISSUES: Normal marrow signal. The major intracranial vascular flow voids are maintained.     ORBITS: No abnormality accounting for technique.     SINUSES/MASTOIDS: No paranasal sinus mucosal disease. No middle ear or mastoid effusion.       Impression    IMPRESSION:  1.  Accounting for differences in modality, there is overall unchanged appearance of the left cerebellar intraparenchymal hematoma with intraventricular extension when compared to the earlier same day head CT. Small volume subarachnoid hemorrhage   involving the bilateral cerebral sulci near the vertex is also similar. No evidence of new hemorrhage.    2.  There is a 0.4 x 0.3 cm area of diffusion restriction along the medial left frontal lobe which could be intraparenchymal and represent a small focus of acute infarct.    3.  There are punctate foci of chronic microhemorrhage within the bilateral cerebellar hemispheres and cerebral white matter, slightly increased in number compared to  04/25/2023. Given the overall distribution, this could related to sequela of chronic   hypertension.

## 2023-12-15 NOTE — PROGRESS NOTES
12/15/23 1415   Appointment Info   Signing Clinician's Name / Credentials (PT) Ame Castle DPT   Living Environment   People in Home spouse   Current Living Arrangements house   Home Accessibility not wheelchair accessible;stairs to enter home;stairs within home;other (see comments)   Number of Stairs, Main Entrance 3   Stair Railings, Main Entrance railings safe and in good condition   Number of Stairs, Within Home, Primary greater than 10 stairs   Stair Railings, Within Home, Primary   (chair lift)   Transportation Anticipated family or friend will provide   Living Environment Comments Pt lives with wife in house, has walk in shower/tub w shower chair, has chair lift for stairs in home, no AD use at baseline but owns walker   Self-Care   Usual Activity Tolerance good   Current Activity Tolerance fair   Regular Exercise Yes   Activity/Exercise Type walking   Exercise Amount/Frequency daily  (.5 miles, likes to walk the dog)   Equipment Currently Used at Home shower chair   Fall history within last six months yes   Number of times patient has fallen within last six months 1   Activity/Exercise/Self-Care Comment Pt previously Ind w all ADLs at baseline, does not use walker at baseline   General Information   Onset of Illness/Injury or Date of Surgery 12/11/23   Referring Physician Susu Rodriguez MD   Patient/Family Therapy Goals Statement (PT) return home   Pertinent History of Current Problem (include personal factors and/or comorbidities that impact the POC) 83 year old male with PMH significant for diabetes mellitus type 2 (on insulin pump, h/o DKA), paroxysmal atrial fibrillation (on Eliquis), hypertension, hyperlipidemia, pulmonary hypertension, h/o spontaneous intracranial hemorrhage, recurrent pleural effusion, chronic kidney disease stage 3, BPH and anemia of chronic disease who presented to the ER with dizziness and fall with head injury . Noted with intracranial hemorrhage .   Weight-Bearing  Status - LUE full weight-bearing   Weight-Bearing Status - RUE full weight-bearing   Weight-Bearing Status - LLE full weight-bearing   Weight-Bearing Status - RLE full weight-bearing   Cognition   Affect/Mental Status (Cognition) WFL   Orientation Status (Cognition) oriented to;person;situation;place   Pain Assessment   Patient Currently in Pain No   Strength (Manual Muscle Testing)   Strength Comments impaired L UE/LE compared with R; 5/5 MMT but fatigues with functional tasks   Bed Mobility   Comment, (Bed Mobility) Min A supine <> sit   Transfers   Comment, (Transfers) Mod A sit <> stand with FWW   Gait/Stairs (Locomotion)   [place] Level (Gait) moderate assist (50% patient effort)   Assistive Device (Gait) walker, front-wheeled   Distance in Feet (Gait) 5'   Deviations/Abnormal Patterns (Gait) left sided deviations   Comment, (Gait/Stairs) unsteady, short steps on left with poor clearance, left lean   Balance   Balance Comments high fall risk with left lean   Coordination   Coordination Comments impaired L UE/LE coordination noted with finger to nose and heel to shin movement   Clinical Impression   Criteria for Skilled Therapeutic Intervention Yes, treatment indicated   PT Diagnosis (PT) impaired mobility   Influenced by the following impairments impaired balance, weakness   Functional limitations due to impairments fall risk   Clinical Presentation (PT Evaluation Complexity) stable   Clinical Presentation Rationale clinical judgement   Clinical Decision Making (Complexity) low complexity   Planned Therapy Interventions (PT) bed mobility training;balance training;gait training;neuromuscular re-education;patient/family education;ROM (range of motion);stair training;strengthening;transfer training   Risk & Benefits of therapy have been explained evaluation/treatment results reviewed;care plan/treatment goals reviewed;risks/benefits reviewed;current/potential barriers reviewed;participants voiced agreement  with care plan;participants included;patient   PT Total Evaluation Time   PT Eval, Low Complexity Minutes (31487) 15   Physical Therapy Goals   PT Frequency Daily   PT Predicted Duration/Target Date for Goal Attainment 12/22/23   PT Goals Bed Mobility;Transfers;Gait;Stairs   PT: Bed Mobility Independent   PT: Transfers Supervision/stand-by assist;Sit to/from stand;Assistive device;Bed to/from chair   PT: Gait Supervision/stand-by assist;Rolling walker;100 feet   PT: Stairs Minimal assist;3 stairs;Rail on both sides   Interventions   Interventions Quick Adds Gait Training;Therapeutic Activity   Therapeutic Activity   Therapeutic Activities: dynamic activities to improve functional performance Minutes (55170) 15   Symptoms Noted During/After Treatment Fatigue   Treatment Detail/Skilled Intervention cues for safe hand placement with transfers from EOB, wheelchair, and EOC. Pt tries to pull up with FWW but improved steadiness with cues for sequencing, feeling for chair with legs, squaring up before sitting with hand on armrests. Reinforced throughout session with multipe transfers during gait training. pt upin chair with all needs in reach, VSS and monitored throughout session on room air.   Gait Training   Gait Training Minutes (15939) 12   Symptoms Noted During/After Treatment (Gait Training) fatigue   Treatment Detail/Skilled Intervention left lean with poor L LE clearance during gait, L UE fatiguing in FWW. Cues for upright posture, standing closer to FWW, improved L LE clearance with gait; noted improved safety and efficiency. Pt fatigues quickly with w/c needed for safety in hallway   Distance in Feet 40' + 5'   [place] Level (Gait Training) moderate assist (50% patient effort)   Physical Assistance Level (Gait Training) verbal cues;1 person + 1 person to manage equipment  (w/c follow)   Assistive Device (Gait Training) rolling walker   PT Discharge Planning   PT Plan ambulation with FWW and wheelchair  follow, L LE coordination, balance   PT Discharge Recommendation (DC Rec) [facility] patient will benefit from intensive, interdisciplinary therapy.  Anticipate will be able to tolerate 3 hours of therapy per day   PT Rationale for DC Rec Pt currently high fall risk limited by impaired balance and L UE/LE impaired coordination; recommend ARU and multidisciplinary team to address functional mobility deficits prior to returning home with spouse assist.   PT Brief overview of current status Mod A with FWW, ataxic L UE/LE, high fall risk   PT Equipment Needed at Discharge walker, rolling   Total Session Time   Timed Code Treatment Minutes 27   Total Session Time (sum of timed and untimed services) 42

## 2023-12-15 NOTE — PROGRESS NOTES
[facility]    Stroke Progress Note    Interval Events  MRI obtained - results as below.    Exam this morning stable/improved- see below for details.    -171  Max temp 99.4  HR 52-84  WBC 11.9>29.6>30.2>18.5>15.6  Na 140  Cr 1.62  Glucose 148-392      HPI Summary  Tc Garcia is a 83 year old male with PMH DM2, paroxysmal atrial fibrillation (on Eliquis), HTN, HLD, pulmonary hypertension, prior ICH within the past year while on AC, CKD, anemia, presented with dizziness, diplopia, nausea, When he stood up he fell due to sudden onset dizziness and nausea. In CT noted to have R gaze preference.  CTH revealed IPH centered in the inferior cerebellar vermis with extension into the fourth ventricle and cerebral aqueduct, no current signs of hydrocephalus. CTA revealed no evidence of AVM. Presenting BP was 190/118. AC was reversed with Kcentra.     12/12-12/13 agitation/confusion - leukocytosis, UTI, CXR also with R>L  lower lung opacities suspicion for aspiration/infection. Abx started (ceftriaxone).      Stroke Evaluation Summarized     MRI/Head CT CT 12/11: IPH centered in the inferior cerebellar vermis with extension into the fourth ventricle and cerebral aqueduct      Repeat CT 12/11: increasing hydro     CTH: 12/12: stable IPH, IV extension, ventriculomegaly appears stable    MRI: stable L cerebellar IPH with IV extension, small volume SAH bilateral cerebral sulci near the vertex?, punctate chronic microhemorrhages renu cerebellar and cerebral with matter increased in number with distribution suggestive of hypertensive etiology   Intracranial Vasculature CTA: mod renu P2 stenosis   Cervical Vasculature CTA: renu cervical ICA athero without significant stenosis      Echocardiogram EF 60-65%, severe biatrial enlargement, mild to mod TR   EKG/Telemetry afib   Other Testing Not Applicable      LDL  12/12/2023: 54 mg/dL   A1C  12/12/2023: 8.1 %   Troponin No lab value available in past  48 hrs       Impression   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 He is not a good candidate for resumption of long term anticoagulation but may be reasonable to consider short term anticoagulation for Watchman in the future.  Leukocytosis - improving since starting antibiotics for UTI, also had abnormal CXR with R>L  lower lung opacities suspicion for aspiration/infection  Hyperglycemia         Plan  - Q4 hours neuro checks and vitals. OK For transfer out of ICU from a stroke neuro standpoint.  - Systolic BP Goal: <160. Adjustment of oral antihypertensives to achieve goal. Discussed with Dr. Hermosillo and recommended increasing carvedilol to 25 mg BID (ordered).  - HOLD anticoagulation. Not an ASPIRE candidate given ICH within the past year. Plan to have him follow up with cardiology in 1 month to discuss Watchman. Discussed with patient and daughter and they are in agreement with this plan.  - Keep Head of bed elevated  - Telemetry, EKG  - Bedside Glucose Monitoring. Needs improved blood glucose management, goal <180. Appreciate hospitalist recommendations for management.  - PT/OT/SLP   - Stroke Education  - Euthermia, Euglycemia, Normonatremia  - Antibiotics/infection management per primary team  - DVT prophylaxis: SCDs, start lovenox today (ordered)        Patient Follow-up    - in 6-8 weeks with Melia Schrader PA-C ([phone]) (ordered)  - Follow up with cardiology in 1 month for consideration of Watchman given recurrent ICH    No further stroke evaluation is recommended, so we will sign off. Please contact us with any additional questions.    Melia Schrader PA-C  Vascular Neurology    To page  "me or covering stroke neurology team member, click here: AMCOM  Choose \"On Call\" tab at top, then select \"NEUROLOGY/ALL SITES\" from middle drop-down box, press Enter, then look for \"stroke\" or \"telestroke\" for your site.  ______________________________________________________    Clinically Significant Risk Factors                  # Hypertension: Noted on problem list  # Chronic heart failure with preserved ejection fraction: heart failure noted on problem list and last echo with EF >50%      # DMII: A1C = 8.1 % (Ref range: <5.7 %) within past 6 months       # Financial/Environmental Concerns: none           Medications   Scheduled Meds   amLODIPine  10 mg Oral Daily    carvedilol  12.5 mg Oral BID w/meals    cefTRIAXone  1 g Intravenous Q24H    insulin aspart   Subcutaneous Daily with breakfast    insulin aspart   Subcutaneous Daily with lunch    insulin aspart   Subcutaneous Daily with supper    insulin aspart  1-10 Units Subcutaneous TID AC    insulin aspart  1-7 Units Subcutaneous At Bedtime    insulin glargine  15 Units Subcutaneous QAM AC    irbesartan  300 mg Oral At Bedtime    senna-docusate  1 tablet Oral or NG Tube At Bedtime       Infusion Meds   - MEDICATION INSTRUCTIONS -         PRN Meds  acetaminophen, glucose **OR** dextrose **OR** glucagon, hydrALAZINE, labetalol, - MEDICATION INSTRUCTIONS -, ondansetron **OR** ondansetron, prochlorperazine **OR** prochlorperazine **OR** prochlorperazine, sodium chloride 0.9%       PHYSICAL EXAMINATION  Temp:  [98  F (36.7  C)-99.4  F (37.4  C)] 99.4  F (37.4  C)  Pulse:  [52-84] 84  Resp:  [10-52] 22  BP: (106-171)/() 147/84  SpO2:  [94 %-98 %] 96 %      General Exam  General:   sitting in chair, no acute distress    HEENT:  normocephalic/atraumatic  Pulmonary:  no respiratory distress     Neuro Exam  Mental Status:  awake, oriented to hospital/month/person/situation, follows commands, speech mildly dysarthric - improving  Cranial Nerves:  visual fields " intact, R gaze preference but can cross midline to L, vertical nystagmus - improving, ?L 6th nerve palsy, facial sensation intact and symmetric, mild L facial droop, hearing not formally tested but intact to conversation, mildly dysarthric (improving)  Motor:  normal muscle tone and bulk, no abnormal movements, able to move all limbs spontaneously, mild weakness LUE/LLE without drift  Sensory:  intact to touch  Coordination:   dysmetria with finger to nose and heel to shin on the L  Station/Gait:  deferred    Stroke Scales    National [employer] Stroke Scale   Score    Level of consciousness: (0)   Alert, keenly responsive    LOC questions: (0)   Answers both questions correctly    LOC commands: (0)   Performs both tasks correctly    Best gaze: (1)   Partial gaze palsy    Visual: (0)   No visual loss    Facial palsy: (1)   Minor paralysis (flat nasolabial fold, smile asymmetry)    Motor arm (left): (0)   No drift    Motor arm (right): (0)   No drift    Motor leg (left): (0)   No drift    Motor leg (right): (0)   No drift    Limb ataxia: (2)   Present in two limbs    Sensory: (0)   Normal- no sensory loss    Best language: (0)   Normal- no aphasia    Dysarthria: (1)   Mild to moderate dysarthria    Extinction and inattention: (0)   No abnormality        Total Score:  5         Imaging  I personally reviewed all imaging; relevant findings per HPI.     Lab Results Data   CBC  Recent Labs   Lab 12/15/23  0528 12/14/23  0524 12/12/23  2223   WBC 15.6* 18.5* 30.2*   RBC 3.78* 3.62* 3.75*   HGB 10.2* 9.7* 10.1*   HCT 32.6* 31.2* 32.3*    251 251     Basic Metabolic Panel    Recent Labs   Lab 12/15/23  0528 12/14/23  2056 12/14/23  1753 12/14/23  0530 12/14/23  0524 12/13/23  0321 12/12/23  2223     --   --   --  141  --  137   POTASSIUM 3.9  --   --   --  4.3  --  4.6   CHLORIDE 103  --   --   --  106  --  100   CO2 30*  --   --   --  29  --  25   BUN 30.5*  --   --   --  31.6*  --  22.4   CR 1.62*   "--   --   --  1.75*  --  1.63*   * 177* 148*   < > 233*   < > 375*   LLOYD 9.3  --   --   --  9.2  --  9.0    < > = values in this interval not displayed.     Liver Panel  No results for input(s): \"PROTTOTAL\", \"ALBUMIN\", \"BILITOTAL\", \"ALKPHOS\", \"AST\", \"ALT\", \"BILIDIRECT\" in the last 168 hours.  INR    Recent Labs   Lab Test 12/11/23  1419 01/15/23  1321 11/21/20  1146   INR 1.35* 1.13 1.50*      Lipid Profile    Recent Labs   Lab Test 12/12/23  0543 01/17/23  0436 05/13/20  0553   CHOL 137 112 116   HDL 68 50 60   LDL 54 52 43   TRIG 76 51 65     A1C    Recent Labs   Lab Test 12/12/23  0543 04/20/23  1804 01/17/23  0436   A1C 8.1* 8.0* 7.2*     Troponin    Recent Labs   Lab 12/11/23  1556 12/11/23  1419   CTROPT 30* 34*          Data Billing:  [id number]: I have personally spent a total of 35 minutes providing care today, time spent in reviewing medical records and reviewing tests, examining the patient and obtaining history, coordination of care, and discussion with the patient and/or family regarding diagnostic results, prognosis, symptom management, risks and benefits of management options, and development of plan of care. Greater than 50% was spent in counseling and coordination of care. Greater than 50% was spent in counseling and coordination of care    "

## 2023-12-16 NOTE — PROGRESS NOTES
"[facility]    Medicine Progress Note - Hospitalist Service    Date of Admission:  12/11/2023    Assessment & Plan   Tc Garcia is a 83 year old male with PMH significant for diabetes mellitus type 2 (on insulin pump, h/o DKA), paroxysmal atrial fibrillation (on Eliquis), hypertension, hyperlipidemia, pulmonary hypertension, h/o spontaneous intracranial hemorrhage, recurrent pleural effusion, chronic kidney disease stage 3, BPH and anemia of chronic disease who presented to the ER with dizziness and fall with head injury . Noted with intracranial hemorrhage .     Intraparenchymal hematoma  H/o spontaneous intracranial hemorrhage (1/2023)  paroxysmal atrial fibrillation (on eliquis)  *HCT with 1.5 X 3 cm intraparenchymal hematoma centered in inferior cerebellar vermis with likely extension into fourth ventricle; no hydrocephalus  *CTA head and neck noted some atherosclerotic plaque with questionable active contrast extravasation into the hematoma  *CT cervical spine noted with compression fracture deformities C7 with slight further loss of height since comparison study and noted with mild degenerative changes     anticoagulation was reversed with Kcentra in ED  Admitted to ICU for close monitoring  Stroke neurology consult requested, I appreciate Melia Schrader's evaluation and recommendations  Per her, \"HOLD anticoagulation. Not an ASPIRE candidate given ICH within the past year. Plan to have him follow up with cardiology in 1 month to discuss Watchman  MRI brain w/wo contrast with SWI (ordered).  Systolic BP Goal: 130-150. Adjustment of oral antihypertensives per hospitalist to achieve goal.\"  See further discussion, below  Neurosurgery consult requested, I appreciate Shiela Warren's evaluation recommendations  Per him, \"Patient is DNR/DNI, does not wish any intervention from neurosurgery, particular surgical intervention or an EVD. Neurosurgery will sign off.\"  per Stroke Neurology, change " "neuro checks to q4, \"OK for transfer out of ICU\"    Diabetes mellitus type II  H/o diabetic ketoacidosis  patient has insulin pump, do not plan to use in hospital  Check metered glucose 4 times daily before meals and at bedtime  High scale corrective dose insulin  Added basal insulin, ~0.1 unit per kg (12 units qam).  I reviewed his insulin pump settings with PharmD and this approximates his home basal insulin dose  Increase basal insulin, again.  He is now receiving slightly more basal insulin than at home.  Added prandial dose insulin, increase dose to replicate pump settings  Needs moderate carbohydrate diet with no sugar sweetened beverages for us to achieve even reasonable blood sugar control    Mild-moderate oral-pharyngeal dysphagia   SLP consult requested, I appreciate Delia Slater's evaluation and recommendaitons  Level 4 diet (pureed) MOD CARB with thin liquids, per SLP, with ongoing therapies    Likely acute on CKD stage III  UTI due to Klebsiella pneumoniae  *baseline creatinine around 1 to 1.1  *creatinine on admission 1.66  Continue Irbesartan  See comments in note from office visit with Lacie Tapia on 11/20/2023, \"Advised not to start Entresto given renal function, then due to further decline in renal function torsemide changed to PRN.\"  Daily BMP  UA with > 182 WBC/hpf, large leukocyte esterase.  Added Ceftriaxone.  Urine culture has greater than 100,000 CFU/mL Klebsiella pneumoniae, sensitive to Ceftriaxone     Hypertension  diastolic CHF  Was on Nicardipine drip as above, this has been discontinued  Per Stroke Neurology, SBP goal is 130-150 mmHg.  It would likely be difficult to achieve readings within this very narrow goal range with oral antihypertensives (e.g., typically requires gtt to achieve consistent readings within 20 mmHg range)  Added Amlodipine  Resumed PTA carvedilol, avoid rebound tachycardia  Resumed Avapro 12/12, increase to PTA dose  hold PTA Demadex, cardiology advised " "changing to as needed dosing  As needed labetalol and hydralazine available; may ultimately need scheduled hydralazine     Anemia of chronic disease  hemoglobin 10.6; monitor hemoglobin         Diet: Combination Diet Moderate Consistent Carb (60 g CHO per Meal) Diet; Pureed Diet (level 4); Thin Liquids (level 0) (1:1 assist/supervision, fully upright position, only when alert, small single sips, no straw, verify/cue swallows)  Room Service    DVT Prophylaxis: Pneumatic Compression Devices  Pruett Catheter: Not present  Lines: None     Cardiac Monitoring: ACTIVE order. Indication: Stroke, acute (48 hours)  Code Status: No CPR- Do NOT Intubate      Clinically Significant Risk Factors                  # Hypertension: Noted on problem list  # Chronic heart failure with preserved ejection fraction: heart failure noted on problem list and last echo with EF >50%      # DMII: A1C = 8.1 % (Ref range: <5.7 %) within past 6 months       # Financial/Environmental Concerns: none         Disposition Plan      Expected Discharge Date: 12/18/2023      Destination: home with help/services;inpatient rehabilitation facility          Susu Rodriguez MD  Hospitalist Service  [facility]  Securely message with [employer] (more info)  Text page via [employer] Paging/Directory   ______________________________________________________________________    Interval History   \"I couldn't live like that.\"  Patient relates recent events, including prior episodes of nausea and vomiting due to ICH, says he \"can't live like that\" but neurologist Dr. Hermosillo assured him he is making medication and other changes to reduce the chance of recurrent ICH.  He states he is not depressed and does not want to die, do not plan to harm himself (see nursing note from 0600).  He denies headache, no respiratory or GI complaints.    Physical Exam   Vital Signs: Temp: 98.4  F (36.9  C) Temp src: Oral BP: 137/75 Pulse: 64   Resp: 16 SpO2: 98 % O2 " Device: None (Room air)    Weight: 132 lbs 7.94 oz    Constitutional: Frail man, awake and alert  Respiratory: Clear to auscultation bilaterally  Cardiovascular: Irregularly irregular rhythm  GI: Normal bowel sounds, soft, non-distended, non-tender  Skin/Integumen: He has numerous ecchymoses on the upper extremities consistent with recent lab draws.  No lower extremity edema  Other: Mood is pleasant      Medical Decision Making       35 MINUTES SPENT BY ME on the date of service doing chart review, history, exam, documentation & further activities per the note.      Data     I have personally reviewed the following data over the past 24 hrs:    11.2 (H)  \   11.4 (L)   / 258     141 100 24.5 (H) /  230 (H)   3.6 31 (H) 1.37 (H) \       Imaging results reviewed over the past 24 hrs:   No results found for this or any previous visit (from the past 24 hour(s)).

## 2023-12-16 NOTE — PLAN OF CARE
Reason for Admission: IPH    Cognitive/Mentation: A/Ox 4  Neuros/CMS: Intact ex blurred/double vision, LUE ataxia  VS: stable on RA, SBP <160.   Tele: Afib CVR.  GI: Continent.  : Incontinent, external catheter in place.  Pulmonary: LS diminished.  Pain: denies.     Drains/Lines: PIV SL  Skin: scattered bruising   Activity: Assist x 1 with gait belt/walker.  Diet: Pureed with thin liquids. Takes pills whole.     Therapies recs: ARU  Discharge: pending    Aggression Stoplight Tool: green    End of shift summary: Pt transferred to  around 1100. Pt stable today, worked with PT/OT. Up to chair x1. Carb count. Had one small emesis today while working with OT, zofran given x1 with relief.

## 2023-12-16 NOTE — PROGRESS NOTES
Care Management Follow Up    Length of Stay (days): 5    Expected Discharge Date: 12/18/2023     Concerns to be Addressed: discharge planning     Patient plan of care discussed at interdisciplinary rounds: Yes    Anticipated Discharge Disposition: ARU     Anticipated Discharge Services: Transportation Services  Anticipated Discharge DME: Other (see comment) (to be determined)    Patient/family educated on Medicare website which has current facility and service quality ratings:    Education Provided on the Discharge Plan: Yes  Patient/Family in Agreement with the Plan: unable to assess    Referrals Placed by CM/BERTHA:    Private pay costs discussed: Not applicable    Additional Information:  SW discussed ARU recommendation with patient and provided recourses regarding the location of [facility]. SW discussed the difference between ARU and TCU and patient is in agreement with [facility]. BERTHA sent referral via DOD.    JERE Cuello    [facility]

## 2023-12-16 NOTE — PLAN OF CARE
Minnie Dueñas RN on 12/16/2023 at 6:00 AM    Reason for Admission: IPH, came in with dizziness  Cognitive/Mentation: A/Ox 4  Neuros/CMS: Intact ex L droop and SOLE ataxia  VS: VSS. BP parameters <160  Tele: Afib.  GI: BS active, passing flatus, last BM 11/15. Continent.  : external cath. Incontinent/incontinent.  Pulmonary: LS diminished.  Pain: denies.   Drains/Lines: R PIV SL, dressing reinforced  Skin: L abrasion on scalp. Blanchable redness on coccyx and redness on R/L groin.  Activity: Assist x 1 with gbw.  Diet: pureed with thin liquids. Takes pills whole. ACHS BG checks  Therapies recs: ARU  Discharge: pending  Aggression Stoplight Tool: green   End of shift summary: stable overnight. Recommend psych consult.

## 2023-12-17 NOTE — PLAN OF CARE
Reason for Admission: IPH    Cognitive/Mentation: A/Ox 4  Neuros/CMS: Intact ex blurred/double vision, LUE ataxia  VS: stable on RA, SBP <160.   Tele: Afib CVR.  GI: Last BM 12/15. Continent.  : Continent/incontinent, external cath removed today d/t redness around groin, resolving.  Pulmonary: LS diminished.  Pain: denies.     Drains/Lines: PIV SL  Skin: intact ex scattered bruising   Activity: Assist x 1 with gait belt/walker.  Diet: Pureed with thin liquids. Takes pills whole.     Therapies recs: ARU  Discharge: pending    Aggression Stoplight Tool: green    End of shift summary: Pt stable today, one small emesis (mostly phlegm). Zofran given x1 for mild nausea with relief. Up to chair x1 today, worked well with OT. Was found to be hypoglycemic with BG of 59 at dinner time, nonsymptomatic. Writer gave orange juice and apple juice, BG went up to 88 within 30 minutes. Spoke with social work regarding helping pt & spouse access resources as they are mostly living independently at home.

## 2023-12-17 NOTE — PROGRESS NOTES
"[facility]    Medicine Progress Note - Hospitalist Service    Date of Admission:  12/11/2023    Assessment & Plan   Tc Garcia is a 83 year old male with PMH significant for diabetes mellitus type 2 (on insulin pump, h/o DKA), paroxysmal atrial fibrillation (on Eliquis), hypertension, hyperlipidemia, pulmonary hypertension, h/o spontaneous intracranial hemorrhage, recurrent pleural effusion, chronic kidney disease stage 3, BPH and anemia of chronic disease who presented to the ER with dizziness and fall with head injury . Noted with intracranial hemorrhage .     Intraparenchymal hematoma  H/o spontaneous intracranial hemorrhage (1/2023)  paroxysmal atrial fibrillation (on eliquis)  *HCT with 1.5 X 3 cm intraparenchymal hematoma centered in inferior cerebellar vermis with likely extension into fourth ventricle; no hydrocephalus  *CTA head and neck noted some atherosclerotic plaque with questionable active contrast extravasation into the hematoma  *CT cervical spine noted with compression fracture deformities C7 with slight further loss of height since comparison study and noted with mild degenerative changes     anticoagulation was reversed with Kcentra in ED  Admitted to ICU for close monitoring  Stroke neurology consult requested, I appreciate Melia Schrader's evaluation and recommendations  Per her, \"HOLD anticoagulation. Not an ASPIRE candidate given ICH within the past year. Plan to have him follow up with cardiology in 1 month to discuss Watchman  MRI brain w/wo contrast with SWI (ordered).  Systolic BP Goal: 130-150. Adjustment of oral antihypertensives per hospitalist to achieve goal.\"  See further discussion, below  Neurosurgery consult requested, I appreciate Shiela Warren's evaluation recommendations  Per him, \"Patient is DNR/DNI, does not wish any intervention from neurosurgery, particular surgical intervention or an EVD. Neurosurgery will sign off.\"  per Stroke Neurology, change " "neuro checks to q4, \"OK for transfer out of ICU\"    Diabetes mellitus type II  H/o diabetic ketoacidosis  patient has insulin pump, do not plan to use in hospital  Check metered glucose 4 times daily before meals and at bedtime  High scale corrective dose insulin  Added basal insulin, ~0.1 unit per kg (12 units qam).  I reviewed his insulin pump settings with PharmD and this approximates his home basal insulin dose  Increase basal insulin, again.  He is now receiving slightly more basal insulin than at home.  Added prandial dose insulin, increase dose to replicate pump settings  Needs moderate carbohydrate diet with no sugar sweetened beverages for us to achieve even reasonable blood sugar control    Mild-moderate oral-pharyngeal dysphagia   SLP consult requested, I appreciate Delia Slater's evaluation and recommendaitons  Level 4 diet (pureed) MOD CARB with thin liquids, per SLP, with ongoing therapies    Likely acute on CKD stage III  UTI due to Klebsiella pneumoniae  *baseline creatinine around 1 to 1.1  *creatinine on admission 1.66  Continue Irbesartan  See comments in note from office visit with Lacie Tapia on 11/20/2023, \"Advised not to start Entresto given renal function, then due to further decline in renal function torsemide changed to PRN.\"  Daily BMP  UA with > 182 WBC/hpf, large leukocyte esterase.  Added Ceftriaxone.  Urine culture has greater than 100,000 CFU/mL Klebsiella pneumoniae, sensitive to Ceftriaxone     Hypertension  diastolic CHF  Was on Nicardipine drip as above, this has been discontinued  Per Stroke Neurology, SBP goal is 130-150 mmHg.  It would likely be difficult to achieve readings within this very narrow goal range with oral antihypertensives (e.g., typically requires gtt to achieve consistent readings within 20 mmHg range)  Added Amlodipine  Resumed PTA carvedilol, avoid rebound tachycardia  Resumed Avapro 12/12, increase to PTA dose  hold PTA Demadex, cardiology advised " changing to as needed dosing  As needed labetalol and hydralazine available; may ultimately need scheduled hydralazine     Anemia of chronic disease  hemoglobin 10.6; monitor hemoglobin         Diet: Combination Diet Moderate Consistent Carb (60 g CHO per Meal) Diet; Pureed Diet (level 4); Thin Liquids (level 0) (1:1 assist/supervision, fully upright position, only when alert, small single sips, no straw, verify/cue swallows)  Room Service    DVT Prophylaxis: Pneumatic Compression Devices  Pruett Catheter: Not present  Lines: None     Cardiac Monitoring: ACTIVE order. Indication: Stroke, acute (48 hours)  Code Status: No CPR- Do NOT Intubate      Clinically Significant Risk Factors                  # Hypertension: Noted on problem list  # Chronic heart failure with preserved ejection fraction: heart failure noted on problem list and last echo with EF >50%      # DMII: A1C = 8.1 % (Ref range: <5.7 %) within past 6 months       # Financial/Environmental Concerns: none         Disposition Plan      Expected Discharge Date: 12/18/2023      Destination: home with help/services;inpatient rehabilitation facility   medically ready for discharge to ARU whenever bed is available       Susu Rodriguez MD  Hospitalist Service  [facility]  Securely message with Melinta (more info)  Text page via [facility] Paging/Directory   ______________________________________________________________________    Interval History   Tc was dozing in the recliner when I arrived to visit him during the mid morning hours.  He did not wake to voice or light touch.  Cannot obtain review of systems from him due to drowsiness.  Discussed with bedside RN.  She says he was asymptomatic with low blood sugar reading (59 mg/dL) yesterday but she gave him some orange juice and his blood sugar went up.  No new respiratory or GI complaints are noted.    Physical Exam   Vital Signs: Temp: 98  F (36.7  C) Temp src: Oral BP: (!) 157/109  Pulse: 79   Resp: 16 SpO2: 97 % O2 Device: None (Room air)    Weight: 132 lbs 7.94 oz    Constitutional: Frail man, dozing.  Looks comfortable.  Respiratory: Clear to auscultation bilaterally  Cardiovascular: Irregularly irregular rhythm  GI: Normal bowel sounds, soft, non-distended, non-tender  Skin/Integumen: He has numerous ecchymoses on the upper extremities consistent with recent lab draws.  No lower extremity edema  Other: Cannot assess mood due to drowsiness      Medical Decision Making       35 MINUTES SPENT BY ME on the date of service doing chart review, history, exam, documentation & further activities per the note.      Data     I have personally reviewed the following data over the past 24 hrs:    N/A  \   N/A   / N/A     N/A N/A N/A /  128 (H)   N/A N/A N/A \       Imaging results reviewed over the past 24 hrs:   No results found for this or any previous visit (from the past 24 hour(s)).

## 2023-12-17 NOTE — PLAN OF CARE
Minnie Dueñas RN on 12/17/2023 at 6:00 AM    Reason for Admission: IPH, came in with dizziness  Cognitive/Mentation: A/Ox 4  Neuros/CMS: Intact ex L droop and SOLE ataxia intermittently.   VS: VSS. BP parameters <160  Tele: Afib.  GI: BS active, passing flatus, last BM 11/16. Continent.  : external cath. Continent/incontinent  Pulmonary: LS diminished.  Pain: denies. Threw up overnight, IV Zofran given.    Drains/Lines: R PIV SL  Skin: L abrasion on scalp. Blanchable redness on coccyx and redness on R/L groin. Scattered bruising.   Activity: Assist x 1 with gbw.  Diet: pureed with thin liquids. Takes pills whole. ACHS BG checks  Therapies recs: ARU  Discharge: pending  Aggression Stoplight Tool: green   End of shift summary: stable overnight.

## 2023-12-17 NOTE — PROGRESS NOTES
Care Management Follow Up    Length of Stay (days): 6    Expected Discharge Date: 12/18/2023     Concerns to be Addressed: discharge planning     Patient plan of care discussed at interdisciplinary rounds: Yes    Anticipated Discharge Disposition: Home, Home Care, Transitional Care     Anticipated Discharge Services: Transportation Services  Anticipated Discharge DME: Other (see comment) (to be determined)    Patient/family educated on Medicare website which has current facility and service quality ratings:    Education Provided on the Discharge Plan: Yes  Patient/Family in Agreement with the Plan: unable to assess    Referrals Placed by CM/SW:    Private pay costs discussed: Not applicable    Additional Information:  SW messaged ARU Liaison to follow up on referral. SW will continue to follow.     Addendum: ARU indicated patient looks appropriate for ARU but not quite ready. May be able to admit tomorrow pending medical readiness and auth.     JERE Cuello    [facility]

## 2023-12-18 NOTE — PLAN OF CARE
Pt here with IPH. A&O x4. Neuros slight L facial droop, L weakness, and garbled speech. VSS, SBP<160. Tele Afib CVR. Mod carb Pureed diet, thin liquids. Pt on carb counts. Takes pills whole. Up with Ax1 GBW. Denies pain. Pt scoring green on the Aggression Stop Light Tool. Plan ARU. Discharge pending.

## 2023-12-18 NOTE — INTERIM SUMMARY
[facility] Pre-Admission Screen    Referral Source:  [facility] NEUROSCIENCE UNIT 1710-01  Admit date to referring facility: 12/11/2023    Physical Medicine and Rehab Consult Completed: No    Rehab Diagnosis:    Brain Dysfunction 02.22 Traumatic, Closed Injury; Fall with resulting intracranial hemorrhage    Justification for Acute Inpatient Rehabilitation  Tc Garcia is a 83 year old male with PMH significant for diabetes mellitus type 2 (on insulin pump, h/o DKA), paroxysmal atrial fibrillation (on Eliquis), hypertension, hyperlipidemia, pulmonary hypertension, h/o spontaneous intracranial hemorrhage, recurrent pleural effusion, chronic kidney disease stage 3, BPH and anemia of chronic disease who presented to the ER with dizziness and fall with head injury. Found to have intracranial hemorrhage. MRI brain with unchanged left cerebellar IPH with intraventricular extension, small volume subarachnoid hemorrhage involving the bilateral cerebral sulci, 0.1 x 0.3 cm area of diffusion restriction along medial left frontal lobe which may represent acute infarct, punctate foci of chronic microhemorrhage in bilateral cerebellar hemispheres. Hospital course has been complicated by dysphagia, BP control, hypo/hyperglycemia with DKA requiring insulin drip, nausea and emesis requiring IV zofran, ABBIE and infection/UTI. He has now been deemed stable for transfer to inpatient rehab.     Tc is very independent with ADL and mobility and is active around his home at baseline. He manages his own medication, drives, shops, cleans and cares for 2 pets at home (dog and cat). He lives with his wife in a home with a couple steps to enter. Currently, he is below baseline, requiring assist of 1-2 with ADL and mobility. Additionally, he is below baseline for swallowing, and is requiring pureed foods. Therapies need to monitor his BP during activity. Patient requires an intensive inpatient rehab  program with PT, OT, and SLP to address the following acute impairments:dysphagia, impaired activity tolerance, impaired balance, impaired coordination, impaired strength, and impaired weight shifting. Cognition should also be assessed in the rehab setting.     Current Active Medical Management Needs/Risks for Clinical Complications  The patient requires the high level of rehabilitation physician supervision that accompanies the provision of intensive rehabilitation therapy.  The patient needs the services of the rehabilitation physician to assess the patient medically and functionally and to modify the course of treatment as needed to maximize the patient's capacity to benefit from the rehabilitation process. The patient requires physician involvement at least 3 times per week to manage the following:  Neuro status: Pt is at risk of neurologic decompensation and AMS. Continue neuro status checks and assess for neurologic recovery. Avoid AMS with use of delirium precautions. Promote sleep hygiene; melatonin in use at bedtime.   Blood pressure: Pt is at risk of uncontrolled blood pressure and further neurologic insult with hypertension. SBP goal is 130-150, which requires close monitoring and titration of BP medications to maintain. Pt currently taking Norvasc, Coreg, Avapro.  Cardiac/anticoagulation:Pt is at risk for arrhythmias and adverse cardiovascular events. He has a history of A-fib and on Eliquis PTA. AC was reversed with Kcentra in the ED, and AC is on hold. He will need to follow up with cardiology in 1 month to determine when AC will restart and discuss potential Watchman Device.   Diabetes Mellitus Type II: pt is at risk for ongoing uncontrolled blood glucose with hypoglycemia with adverse events and hyperglycemia with poor healing. Hgb A1c 8.1% on 12/12/23. Patient has ambulatory insulin pump, used briefly in the hospital but went into DKA 12/24 so this was discontinued. Will need ongoing check of  metered glucose 4 times daily before meals and at bedtime. High scale corrective dose insulin, added basal insulin, ~0.1 unit per kg (12 units qam).  He is now receiving slightly more basal insulin than at home. Added prandial dose insulin, increase dose to replicate pump settings. Needs moderate carbohydrate diet with no sugar sweetened beverages for us to achieve even reasonable blood sugar control. Continue to monitor closely and educate on diabetic diet. Patient will be switching back to using his Insulin Pump while on ARC and will need to be followed by Endocrine while on Acute Rehab.   Dysphagia: Pt is at risk of aspiration pneumonia. Continue interventions with SLP for safe swallowing with Level 4 pureed diet and thin liquids. Monitor for any signs of aspiration and for respiratory decompensation.  Nausea/vomitting: pt has required IV anti-emetics. Continue to assess GI status and for need for oral anti-emetics.   Acute Kidney Injury: Pt is at risk of further kidney injury, fluid and electrolyte imbalance. Cr ~1.0 at baseline and it was 1.66 on admission. Continue Irbesartan. Avoid nephrotoxins. Torsemide changed to prn. Continue daily BMP and monitor fluid balance and electrolytes.   Infection: Pt has a UTI due to Klebsiella pneumoniae; IV Ceftriaxone discontinued 12/17.  Pt is at risk of recurrent infection. Also noted possible aspiration pneumonia; Chest x-ray right greater than left lower lung opacity suspicious for aspiration/infection. Follow-up imaging of chest in 6 weeks for resolution. Aggressive incentive spirometry and strict aspiration precautions. Assess to see that infection symptoms have cleared. Follow WBC trend (trending down). Empirically now on IV Rocephin Q 24 for a total of 7 days.  DVT prevention: Pt is subcutaneous Lovenox due to risk of DVT. Monitor for signs of DVT.   High falls risk: Due to mobility and balance impairment. Guard against falls at all times. Educate pt and family on  falls prevention strategies.     Past Medical/Surgical History  Surgery in the past 100 days: No  Additional relevant past medical history: Diabetes Mellitus type II, Compression fx deformities of C7, CKD stage III, HTN, anemia    Level of Functioning Prior to Admission:    LIVING ENVIRONMENT  People in Home: spouse  Current Living Arrangements: house  Home Accessibility: not wheelchair accessible, stairs to enter home, stairs within home, other (see comments)  Number of Stairs, Main Entrance: 3  Stair Railings, Main Entrance: railings safe and in good condition  Number of Stairs, Within Home, Primary: greater than 10 stairs  Stair Railings, Within Home, Primary:  (chair lift)  Transportation Anticipated: family or friend will provide  Living Environment Comments: Pt lives with wife in house, has walk in shower/tub w shower chair, has chair lift for stairs in home, no AD use at baseline but owns walker    SELF-CARE  Usual Activity Tolerance: good  Regular Exercise: Yes  Activity/Exercise Type: walking  Exercise Amount/Frequency: daily (.5 miles, likes to walk the dog)  Equipment Currently Used at Home: shower chair  Activity/Exercise/Self-Care Comment: Pt previously Ind w all ADLs at baseline, does not use walker at baseline    Additional Comments: NA    Level of Function: GG Scale (Section GG Functional Ability and Goals; CMS's SIU Version 3.0 Manual effective 10.1.2019):  PT Current Function Goals for Rehab   Bed Rolling 4 Supervision or touching assitance 6 Independent   Supine to Sit 4 Supervision or touching assitance 6 Independent   Sit to Stand 3 Partial/moderate assistance  6 Independent   Transfer 3 Partial/moderate assistance 6 Independent   Ambulation 3 Partial/moderate assistance  6 Independent   Stairs 88 Not attempted due to safety 4 Supervision or touching assitance     OT Current Function Goals for Rehab   Feeding 4 Supervision or touching assitance 6 Independent   Grooming 4 Supervision or touching  assitance 6 Independent   Bathing Not completed 4 Supervision or touching assitance   Upper Body Dressing Not completed 6 Independent   Lower Body Dressing 3 Partial/moderate assistance 6 Independent   Toileting 4 Supervision or touching assitance 6 Independent   Toilet Transfer 3 Partial/moderate assistance 6 Independent   Tub/Shower Transfer 88 Not attempted due to safety 4 Supervision or touching assitance   Cognition Not Assessed Supervision     SLP Current Function Goals for Rehab   Swallow Not Impaired Not applicable   Communication Not Impaired Not applicable       Current Diet:  0-Thin and 5-Minced and moist - highest he can progress due to lack of dentures    Summary Statement:  Pt working with OT and PT and is making progress. In SLP, patient has been discharged as he has progressed to minced and moist and thin liquids. Patient unable to progress beyond this due to no dentures here. Pt needs up to Min A for sit<>stand transfers from chairs, and requires verbal and tactile cues for weight shifting to achieve static standing balance, as he tends to lean anteriorly or posteriorly.  Patient ambulates 50' with min-mod Ax1 pending fatigue. With increased fatigue pt loses control of walker and ambulates more quickly leading to increased risk of falls. In OT, pt able to stand at sink to complete grooming tasks with CGA.     Expected Therapies and Services Required During Inpatient Rehab Admission  Intensity of Therapy: Patient requires intensive therapies not available in a lesser level of care. Patient is motivated, making gains, and can tolerate 3 hours of therapy a day.  Physical Therapy: 90 minutes per day, 6 days a week for 12 days  Occupational Therapy: 90 minutes per day, 6 days a week for 12 days  Speech and Language Therapy: Not indicated at this time.   Rehabilitation Nursing Needs: Patient requires 24 hour Rehab Nursing to manage vitals, medication education, carryover of new rehab techniques, care  coordination, blood sugar management, diabetes education, assess neurologic status, provide safe environment for patient at falls risk, and safe swallowing .    Precautions/restrictions/special needs:   Precautions: fall precautions and aim to keep -180   Restrictions: NA   Special Needs:  [place]    Expected Level of Improvement: Anticipate pt to achieve Mod (I) with ADL and mobility around the home with the exception of SBA with bathing/tub transfers and on stairs due to fall risk.   Expected Length of time to achieve: 12 days    Anticipated Discharge Needs:  Anticipated Discharge Destination: Home  Anticipated Discharge Support:  Wife and daughter  24/7 support available : Yes  Identified caregiver(s):  Wife and daughter  Anticipated Discharge Needs: Home with homecare and Home with outpatient therapy    Identified challenges/barriers:  None identified    Liaison signature/date/time:    Physician statement of review and agreement:  I have reviewed and am in agreement of the need for IRF stay to address above functional and medical needs. In addition to above statements address, Patient requires intensive active and ongoing therapeutic intervention and multiple therapies; Patient requires medical supervision; Expected to actively participate in the intensive rehab program; Sufficiently stable to actively participate; Expectation for measurable improvement in functional capacity or adaption to impairments.    MD signature/date/time:

## 2023-12-18 NOTE — PLAN OF CARE
Minnie Dueñas RN on 12/18/2023 at 6:00 AM    Reason for Admission: IPH  Cognitive/Mentation: A/Ox 4  Neuros/CMS: Intact ex slight L droop and SOLE ataxia intermittently   VS: VSS. BP parameters <160  Tele: Afib.  GI: BS active, passing flatus, last BM 11/17. Continent.  : external cath. Continent/incontinent  Pulmonary: LS diminished.  Pain: denies. Threw up overnight, IV compazine  given.    Drains/Lines: R PIV SL  Skin: L abrasion on scalp. Blanchable redness on coccyx. Scattered bruising. Redness, sores and tenderness on perineum.   Activity: Assist x 1 with gbw.  Diet: pureed with thin liquids. Takes pills whole. ACHS BG checks  Therapies recs: ARU  Discharge: possibly tomorrow   Aggression Stoplight Tool: green   End of shift summary: stable overnight. Hypoglycemic (67) at the beginning of the night. Was having some nausea that could be associated. PO OJ PO given.

## 2023-12-18 NOTE — PROGRESS NOTES
Rehab Admissions:  Met with pt and spoke with spouse over the phone to provide information regarding anticipated stay at [facility]. Provided pt with information on location, parking, phone numbers, what to bring, about our intensive rehab program, visiting hours, meals, ELOS (14 days). Pt asked very appropriate questions and was very pleased to know that he can attend our rehab program. Wife reports she does not drive, but hopes that their daughter will bring her to visit him.    Thank you for the referral, we will continue to follow this patient for post acute placement.     Determination of admission is based upon the patient's need for an intensive, interdisciplinary approach to rehabilitation, their ability to progress, their ability to tolerate intensive therapies, their need for daily physician supervision, their need for twenty four hour nursing assistance, and their ability and willingness to participate in such a program.    Dayanara Nair CM  Rehab Liaison/  [facility]  12/18/2023    4:57 PM

## 2023-12-18 NOTE — PLAN OF CARE
Reason for Admission: IPH    Cognitive/Mentation: A/Ox 4  Neuros/CMS: Intact ex blurred/double vision, LUE ataxia  VS: stable on RA, SBP <160. Hypertensive after PT, Hydralazine given x1.    Tele: Afib CVR.  GI: Last BM 12/16. Continent.  : Continent/incontinent, external cath in place, voiding adequately.  Pulmonary: LS diminished in bases.  Pain: denies.     Drains/Lines: PIV SL  Skin: intact ex scattered bruising   Activity: Assist x 1 with gait belt/ walker.  Diet: Pureed with thin liquids. Takes pills whole.     Therapies recs: ARU  Discharge: pending, possibly tomorrow     Aggression Stoplight Tool: green    End of shift summary: Pt stable today except hypertensive after PT. No nausea reported. Up to chair x2, hypoglycemic again this evening, did not give evening insulin. Nonsymptomatic & good OJ PO intake. Pt's wife called, writer was unable to speak with her, but had conversation with pt regarding wife's issues with memory and ability to care for herself. Social work aware, and has family support. Pt stated stress of caring for wife and is ready for ARU placement.

## 2023-12-18 NOTE — PROGRESS NOTES
"[facility]    Medicine Progress Note - Hospitalist Service       Date of Admission:  12/11/2023  Assessment & Plan   Tc Garcia is a 83 year old male with PMH significant for diabetes mellitus type 2 (on insulin pump, h/o DKA), paroxysmal atrial fibrillation (on Eliquis), hypertension, hyperlipidemia, pulmonary hypertension, h/o spontaneous intracranial hemorrhage, recurrent pleural effusion, chronic kidney disease stage 3, BPH and anemia of chronic disease who presented to the ER with dizziness and fall with head injury . Noted with intracranial hemorrhage .     Intraparenchymal hematoma  H/o spontaneous intracranial hemorrhage (1/2023)  paroxysmal atrial fibrillation (on eliquis)  *HCT with 1.5 X 3 cm intraparenchymal hematoma centered in inferior cerebellar vermis with likely extension into fourth ventricle; no hydrocephalus  *CTA head and neck noted some atherosclerotic plaque with questionable active contrast extravasation into the hematoma  *CT cervical spine noted with compression fracture deformities C7 with slight further loss of height since comparison study and noted with mild degenerative changes  *MRI brain with unchanged left cerebellar intraparenchymal hematoma with intraventricular extension, small volume subarachnoid hemorrhage involving the bilateral cerebral sulci, 0.1 x 0.3 cm area of diffusion restriction along medial left frontal lobe which may represent acute infarct, punctate foci of chronic microhemorrhage in bilateral cerebellar hemispheres     Anticoagulation was reversed with Kcentra in ED  Admitted to ICU initially for close monitoring  Stroke neurology consulted recommending: \"HOLD anticoagulation. Not an ASPIRE candidate given ICH within the past year. Plan to have him follow up with cardiology in 1 month to discuss Watchman. Follow-up with stroke neurology in 6-8 weeks   Systolic BP Goal: 130-150.    Neurosurgery consult requested, I appreciate Shiela " "Kelvin's evaluation recommendations  Per him, \"Patient is DNR/DNI, does not wish any intervention from neurosurgery, particular surgical intervention or an EVD. Neurosurgery will sign off.\"  Continue neuro checks q4H  PT/OT consulted, planning for discharge to ARU      Diabetes mellitus type II with hypoglycemia  H/o diabetic ketoacidosis  Patient has insulin pump, do not plan to use in hospital in setting of [facility]  HgbA1c 8.1% 12/12/23  Blood sugars labile, with mild hypoglycemia particularly before dinner and at bedtime  Reduce sliding scale insulin to medium protocol (approximates correction scale from pump  Continue glargine 13 units daily  Adjusted prandial insulin to reflect pump regimen     Mild-moderate oral-pharyngeal dysphagia   SLP consulted  Diet per speech      Likely acute on CKD stage III  UTI due to Klebsiella pneumoniae  *Baseline creatinine around 1 to 1.1 in early 2023, more recently around 1.5  *Creatinine on admission 1.66  Continue Irbesartan  See comments in note from office visit with Lacie Tapia on 11/20/2023, \"Advised not to start Entresto given renal function, then due to further decline in renal function torsemide changed to PRN.\"  Creatinine stable   UA with > 182 WBC/hpf, large leukocyte esterase.  Added Ceftriaxone.  Urine culture has greater than 100,000 CFU/mL Klebsiella pneumoniae, sensitive to Ceftriaxone  Continue cefuroxime to complete course     Hypertension  Chronic diastolic CHF  Was on Nicardipine drip as above, this has been discontinued  Per Stroke Neurology, SBP goal is 130-150 mmHg.   Continue amlodipine (added this stay)  Resumed PTA carvedilol, avoid rebound tachycardia  Resumed Avapro 12/12, increase to PTA dose  hold PTA Demadex, cardiology advised changing to as needed dosing  As needed labetalol and hydralazine available; may ultimately need scheduled hydralazine     Anemia of chronic disease  Hemoglobin stable         Clinically Significant Risk Factors               "    # Hypertension: Noted on problem list  # Chronic heart failure with preserved ejection fraction: heart failure noted on problem list and last echo with EF >50%      # DMII: A1C = 8.1 % (Ref range: <5.7 %) within past 6 months       # Financial/Environmental Concerns: none            Diet: Room Service  Diet  Combination Diet Moderate Consistent Carb (60 g CHO per Meal) Diet; Minced and Moist Diet (level 5); Thin Liquids (level 0) (1:1 assist/supervision, fully upright position, only when alert, small single sips, no straw, verify/cue swallows)    DVT Prophylaxis: Enoxaparin (Lovenox) SQ  Pruett Catheter: Not present  Code Status: No CPR- Do NOT Intubate      Disposition Plan      Expected Discharge Date: 12/19/2023      Destination: home with help/services;inpatient rehabilitation facility       Entered: Oneal Oconnor MD 12/18/2023, 11:11 AM       The patient's care was discussed with the Care Coordinator/ and Patient.    Oneal Oconnor MD  Hospitalist Service  [facility]    ______________________________________________________________________    Interval History   No acute events overnight.  He feels his speech is not quite back to normal, but improved.  Continues to feel some mild weakness on the left but again improving.  Denies any chest pain or shortness of breath.    Data reviewed today: I reviewed all medications, new labs and imaging results over the last 24 hours. I personally reviewed no images or EKG's today.    Physical Exam   Vital Signs: Temp: 97.5  F (36.4  C) Temp src: Oral BP: (!) 149/82 Pulse: 77   Resp: 16 SpO2: 99 % O2 Device: None (Room air)    Weight: 132 lbs 7.94 oz    Constitutional: Well-appearing, NAD  Respiratory: Clear to auscultation bilaterally, good air movement bilaterally  Cardiovascular: RRR. No peripheral edema.  GI: Soft, non-tender, non-distended.    Skin/Integumen: Warm, dry  Neuro:  Alert. Responding to questions appropriately.  Following commands.     Data   Recent Labs   Lab 12/18/23  0758 12/17/23  2141 12/17/23  1912 12/17/23  0822 12/17/23  0708 12/16/23  0835 12/16/23  0738 12/15/23  1636 12/15/23  1159 12/15/23  0811 12/15/23  0528 12/11/23  1647 12/11/23  1419   WBC  --   --   --   --  11.8*  --  11.2*  --   --   --  15.6*   < > 11.9*   HGB  --   --   --   --  11.4*  --  11.4*  --   --   --  10.2*   < > 10.6*   MCV  --   --   --   --  83  --  84  --   --   --  86   < > 86   PLT  --   --   --   --  256  --  258  --   --   --  219   < > 295   INR  --   --   --   --   --   --   --   --   --   --   --   --  1.35*   NA  --   --   --   --  138  --  141  --   --   --  140   < > 135   POTASSIUM  --   --   --   --  3.4  --  3.6  --   --   --  3.9   < > 5.0   CHLORIDE  --   --   --   --  98  --  100  --   --   --  103   < > 96*   CO2  --   --   --   --  30*  --  31*  --   --   --  30*   < > 29   BUN  --   --   --   --  21.5  --  24.5*  --   --   --  30.5*   < > 19.0   CR  --   --   --   --  1.29*  --  1.37*  --  1.55*  --  1.62*   < > 1.66*   ANIONGAP  --   --   --   --  10  --  10  --   --   --  7   < > 10   LLOYD  --   --   --   --  9.2  --  9.3  --   --   --  9.3   < > 9.3   * 67* 136*   < > 253*   < > 215*   < >  --    < > 241*   < > 353*    < > = values in this interval not displayed.       No results found for this or any previous visit (from the past 24 hour(s)).  Medications    - MEDICATION INSTRUCTIONS -        amLODIPine  10 mg Oral Daily    carvedilol  25 mg Oral BID w/meals    cefuroxime  500 mg Oral Q12H [facility] (08/20)    enoxaparin ANTICOAGULANT  40 mg Subcutaneous Q24H    insulin aspart  1-7 Units Subcutaneous TID AC    insulin aspart  1-5 Units Subcutaneous At Bedtime    insulin aspart   Subcutaneous Daily with breakfast    insulin aspart   Subcutaneous Daily with lunch    insulin aspart   Subcutaneous Daily with supper    insulin glargine  13 Units Subcutaneous QAM AC    irbesartan  300 mg Oral At Bedtime    senna-docusate   1 tablet Oral or NG Tube At Bedtime

## 2023-12-19 NOTE — PROGRESS NOTES
[facility]  Hospitalist Progress Note   12/19/2023          Assessment and Plan:       Tc Garcia is a 83 year old male with diabetes mellitus type 2 (on insulin pump, h/o DKA), paroxysmal atrial fibrillation (on Eliquis), hypertension, hyperlipidemia, pulmonary hypertension, h/o spontaneous intracranial hemorrhage, recurrent pleural effusion, chronic kidney disease stage 3, BPH and anemia of chronic disease mated on 12/11/2023 with dizziness, fall and head injury.       Intraparenchymal hematoma    Mild hydrocephalus   Elevated troponin on admission likely in the setting of demand ischemia from fall.  H/o spontaneous intracranial hemorrhage (1/2023)  paroxysmal atrial fibrillation (on eliquis prior to admission)  Presented with dizziness diplopia and nausea.  *HCT with 1.5 X 3 cm intraparenchymal hematoma centered in inferior cerebellar vermis with likely extension into fourth ventricle; no hydrocephalus  *CTA head and neck noted some atherosclerotic plaque with questionable active contrast extravasation into the hematoma  *CT cervical spine noted with compression fracture deformities C7 with slight further loss of height since comparison study and noted with mild degenerative changes  *MRI brain with unchanged left cerebellar intraparenchymal hematoma with intraventricular extension, small volume subarachnoid hemorrhage involving the bilateral cerebral sulci, 0.1 x 0.3 cm area of diffusion restriction along medial left frontal lobe which may represent acute infarct, punctate foci of chronic microhemorrhage in bilateral cerebellar hemispheres  Echocardiogram with EF of 60 to 65%.  Severe biatrial enlargement.  Mild to moderate TR.  Telemetry atrial fibrillation.  LDL 54.  Troponin 34 >30  --- Etiology for intraparenchymal hematoma likely hypertensive and in the setting of chronic anticoagulation with Eliquis versus traumatic after sudden onset of dizziness and fall.  Anticoagulation was reversed  "with Kcentra in ED  Admitted to ICU initially for close monitoring  Stroke neurology Followed: \"HOLD anticoagulation. Not an ASPIRE candidate given ICH within the past year.   Systolic BP Goal: 130-150.    Neurosurgery followed, \"Patient is DNR/DNI, does not wish any intervention from neurosurgery, particular surgical intervention or an EVD. Neurosurgery will sign off.\"  Continue neuro checks q4H  PT/OT consulted, planning for discharge to ARU   Tele monitor  Needs improvement in blood sugars, have taken over patient's care today and have optimized as able to.  Will need further optimization per hospitalist rounding on 12/20  On subcutaneous Lovenox for DVT prophylaxis, stroke neurology okay.  Plan to have him follow up with cardiology in 1 month to discuss Watchman. Follow-up with stroke neurology in 6-8 weeks       Hyperglycemia from uncontrolled diabetes mellitus.  Episodes of intermittent hypoglycemia  Diabetes mellitus type II HgbA1c 8.1% 12/12/23  H/o diabetic ketoacidosis.  -- Patient's insulin pump has been on hold since admission in the setting of IPH.  Blood sugars labile during hospital stay.  Increase insulin aspart to 8 units 3 times daily [12/19)   Increase insulin Lantus to 15 units daily [12/19]   Plain sliding scale.  Monitor blood sugars closely.  If continued elevated blood sugars will need to place on insulin drip, ordered ketones.  Anion gap this morning within normal limits.  Needs improved blood glucose management, goal <180, have taken over patient's care today and have optimized as able to.  Will need further optimization per hospitalist rounding on 12/20       Mild-moderate oral-pharyngeal dysphagia   SLP following Diet per speech     UTI due to Klebsiella pneumoniae  Possible aspiration pneumonia.  On 12/12 to 12/13 had agitation, confusion.  Had leukocytosis.  Blood cultures  no growth  UA with > 182 WBC/hpf, large leukocyte esterase.   Urine culture has greater than 100,000 CFU/mL " "Klebsiella pneumoniae  Chest x-ray right greater than left lower lung opacity suspicious for aspiration/infection.  Was on IV ceftriaxone, subsequently switched to cefuroxime  to complete course  Speech therapy following for dysphagia as above.  Follow-up imaging of chest in 6 weeks for resolution.  Aggressive incentive spirometry.  Strict aspiration precautions.  Trend WBC count, fever curve.     Chronic diastolic CHF  Atrial fibrillation PTA on Eliquis.  Hypertension.  Hyperlipidemia.  Prior to admission Eliquis on hold since admission.  Echocardiogram with EF of 60 to 65%.  Severe biatrial enlargement.  Mild to moderate TR.  Telemetry atrial fibrillation.  LDL 54.  Was on Nicardipine drip on admission.   Per Stroke Neurology, SBP goal is 130-150 mmHg.   Continue amlodipine (added this stay)  Continue PTA carvedilol, avoid rebound tachycardia  Continue Avapro   hold PTA Demadex, cardiology recently advised changing to as needed dosing  As needed labetalol and hydralazine available  Follow-up with cardiology after discharge, discuss watchman.  Telemetry monitoring.     Anemia of chronic disease  Monitor hemoglobin levels closely    Physical deconditioning from medical illness, senile frailty.  PT, OT following.  ARU for rehabilitation.    Chronic C7 compression fracture.  Cervical flexion-extension x-ray without any dynamic instability.  Per chart review neurosurgery 12/12 recommending likely healed since his initial imaging 4 years ago.  Given no dynamic instability on x-ray recommend no cervical collar needed.     CKD stage III  *Baseline creatinine around 1 to 1.1 in early 2023, more recently around 1.5  *Creatinine on admission 1.66  Continue Irbesartan  See comments in note from office visit with Lacie Tapia on 11/20/2023, \"Advised not to start Entresto given renal function, then due to further decline in renal function torsemide changed to PRN.\"  Creatinine stable        Orders Placed This Encounter      " Diet      Combination Diet Moderate Consistent Carb (60 g CHO per Meal) Diet; Soft and Bite Sized Diet (level 6); Thin Liquids (level 0) (1:1 assist/supervision, fully upright position, only when alert, small single sips, verify/cue swallows)      DVT Prophylaxis: SCDs, ambulate.  Subcutaneous Lovenox.  Code Status: No CPR- Do NOT Intubate  Disposition: Expected discharge pending ARU placement likely 12/20.    Discussed with patient, bedside RN,BERTHA  >51 minutes spent by me on the date of service doing chart review, history, exam, documentation & further activities per the note.      Ashtyn Hassan MD        Interval History:        Patient lying in bed.  Overnight blood sugars 300s to 400s.  Patient denies any chest pain or palpitations.  Denies any shortness of breath.  Reporting nausea, oral intake.  No new neurological weakness.  No new tingling or numbness.  No bowel or bladder incontinence  Telemetry atrial fibrillation rate controlled.  Has not been out of bed this morning.           Physical Exam:        Physical Exam   Temp:  [97.9  F (36.6  C)-98.2  F (36.8  C)] 98.2  F (36.8  C)  Pulse:  [56-79] 56  Resp:  [16] 16  BP: (121-146)/(67-96) 121/67  SpO2:  [96 %-99 %] 99 %    Intake/Output Summary (Last 24 hours) at 12/19/2023 1633  Last data filed at 12/19/2023 1200  Gross per 24 hour   Intake 358 ml   Output 700 ml   Net -342 ml       Admission Weight: 61.2 kg (135 lb)  Current Weight: 60.1 kg (132 lb 7.9 oz)    PHYSICAL EXAM  GENERAL: Patient is in no distress. Alert and oriented.  HEENT: Oropharynx pink,   HEART: irregular rate and rhythm. S1S2.  LUNGS: Clear to auscultation bilaterally. No expiratory wheeze.  Respirations unlabored  ABDOMEN: Soft, no abdominal tenderness, bowel sounds heard   NEURO: Moving all extremities.  Slight left facial droop, speech mild dysarthria.  No arm drift appreciated  EXTREMITIES: No pedal edema.   SKIN: Warm, dry. No rash   PSYCHIATRY Cooperative       Medications:          amLODIPine  10 mg Oral Daily    carvedilol  25 mg Oral BID w/meals    cefuroxime  500 mg Oral Q12H [facility] (08/20)    enoxaparin ANTICOAGULANT  40 mg Subcutaneous Q24H    insulin aspart  8 Units Subcutaneous TID w/meals    insulin aspart  1-7 Units Subcutaneous TID AC    insulin aspart  1-5 Units Subcutaneous At Bedtime    [START ON 12/20/2023] insulin glargine  15 Units Subcutaneous QAM AC    irbesartan  300 mg Oral At Bedtime    senna-docusate  1 tablet Oral or NG Tube At Bedtime     acetaminophen, glucose **OR** dextrose **OR** glucagon, hydrALAZINE, labetalol, - MEDICATION INSTRUCTIONS -, melatonin, ondansetron **OR** ondansetron, prochlorperazine **OR** prochlorperazine **OR** prochlorperazine, sodium chloride 0.9%         Data:      All new lab and imaging data was reviewed.

## 2023-12-19 NOTE — PLAN OF CARE
Pt here with IPH. A&O x4. Neuros L droop, garbled speech, and ataxia. VSS, SBP<160. Tele Afib. Mod carb- Soft and bite size diet, thin liquids. Takes pills whole. PRN Zofran given for N/V. BG labile throughout shift, MD aware. Up with Ax1 GBW. Denies pain. Pt scoring green on the Aggression Stop Light Tool. Plan ARU. Discharge pending.

## 2023-12-19 NOTE — PROVIDER NOTIFICATION
MD Notification    Notified Person: MD    Notified Person Name: Dr. Hassan    Notification Date/Time: 12/19/23 @ 0925    Notification Interaction: Web page    Purpose of Notification: FYI pts BG recheck 394 after morning insulin     Orders Received: Additional dose of insulin given and recheck 1 hour later

## 2023-12-19 NOTE — PROGRESS NOTES
"Care Management Follow Up    Length of Stay (days): 8    Expected Discharge Date: 12/20/2023     Concerns to be Addressed: discharge planning     Patient plan of care discussed at interdisciplinary rounds: Yes    Anticipated Discharge Disposition: [place] [facility]    Anticipated Discharge Services: Transportation Services  Anticipated Discharge DME: none    Patient/family educated on Medicare website which has current facility and service quality ratings:  no  Education Provided on the Discharge Plan: Yes  Patient/Family in Agreement with the Plan: unable to assess    Referrals Placed by CM/SW:  ARU  Private pay costs discussed: Not applicable    Additional [facility] states that they cannot accept patient today while his blood sugars are labile. BERTHA updated hospitalist.    Daughter called BERTHA and asked if SW had tried to reach her yesterday about \"the financial situation\" and MA application. According to the chart, social work did not appear to call her and she was working with financial counselors. BERTHA emailed financial counselor assigned.     Anel Joyner, LEV, LGSW   Social Work   [facility]    "

## 2023-12-19 NOTE — PLAN OF CARE
Pt here with IPH. A&Ox4. Neuros are slight L droop, garbled speech, LUE weaker 4/5, ataxic to finger to nose. VSS, SBP<160. Tele A-Fib CVR. Mod carb minced and moist diet, thin liquids. Takes pills whole. Up with A1/GBW. Denies pain. Pt scoring green on the Aggression Stop Light Tool. Plan is for ARU when bed is available. Discharge pending.

## 2023-12-20 NOTE — PROGRESS NOTES
Care Management Follow Up    Length of Stay (days): 9    Expected Discharge Date: 12/21/2023     Concerns to be Addressed: discharge planning     Patient plan of care discussed at interdisciplinary rounds: Yes    Anticipated Discharge Disposition: Home, Home Care, Transitional Care     Referrals Placed by CM/BERTHA:  ARU  Private pay costs discussed: Not applicable    Additional Information:  Per hospitalist, patient will stay another day to address his blood sugar. When he is medically ready, he will be added to the wait list at [facility].     LEV Valderrama, LGSW   Social Work   [facility]

## 2023-12-20 NOTE — PLAN OF CARE
Pt here with IPH. A&Ox4. Neuros are slight L droop, garbled speech, LUE weaker 4/5, ataxic to finger to nose. VSS, SBP<160. Tele A-Fib CVR. Mod carb  soft and bite size diet, thin liquids. Takes pills whole. Up with A1/GBW. Continent of B&B. Denies pain. Pt scoring green on the Aggression Stop Light Tool. Plan is for ARU when stable.  Discharge pending.

## 2023-12-20 NOTE — PROGRESS NOTES
Patient is AO X 4; VSS, yet hypotensive after administration of Cores when asleep, 50s when awake. Physician was notified, Coreg was adjusted.   BG was well controlled; carb counting.  500 ml bolus was administered for elevated lactic acid; lactic is now normalized.   He is in no acute distress.   Up /.

## 2023-12-20 NOTE — PROGRESS NOTES
[facility]    Medicine Progress Note - Hospitalist Service    Date of Admission:  12/11/2023    Assessment & Plan   Tc Garcia is a 83 year old male with diabetes mellitus type 2 (on insulin pump, h/o DKA), paroxysmal atrial fibrillation (on Eliquis), hypertension, hyperlipidemia, pulmonary hypertension, h/o spontaneous intracranial hemorrhage, recurrent pleural effusion, chronic kidney disease stage 3, BPH and anemia of chronic disease mated on 12/11/2023 with dizziness, fall and head injury.       Intraparenchymal hematoma    Mild hydrocephalus   Elevated troponin on admission likely in the setting of demand ischemia from fall.  H/o spontaneous intracranial hemorrhage (1/2023)  paroxysmal atrial fibrillation (on eliquis prior to admission)  Presented with dizziness diplopia and nausea.  *HCT with 1.5 X 3 cm intraparenchymal hematoma centered in inferior cerebellar vermis with likely extension into fourth ventricle; no hydrocephalus  *CTA head and neck noted some atherosclerotic plaque with questionable active contrast extravasation into the hematoma  *CT cervical spine noted with compression fracture deformities C7 with slight further loss of height since comparison study and noted with mild degenerative changes  *MRI brain with unchanged left cerebellar intraparenchymal hematoma with intraventricular extension, small volume subarachnoid hemorrhage involving the bilateral cerebral sulci, 0.1 x 0.3 cm area of diffusion restriction along medial left frontal lobe which may represent acute infarct, punctate foci of chronic microhemorrhage in bilateral cerebellar hemispheres  Echocardiogram with EF of 60 to 65%.  Severe biatrial enlargement.  Mild to moderate TR.  Telemetry atrial fibrillation.  LDL 54.  Troponin 34 >30  --- Etiology for intraparenchymal hematoma likely hypertensive and in the setting of chronic anticoagulation with Eliquis versus traumatic after sudden onset of dizziness  "and fall.  Anticoagulation was reversed with Kcentra in ED  Admitted to ICU initially for close monitoring  Stroke neurology Followed: \"HOLD anticoagulation. Not an ASPIRE candidate given ICH within the past year.   Systolic BP Goal: 130-150.    Neurosurgery followed, \"Patient is DNR/DNI, does not wish any intervention from neurosurgery, particular surgical intervention or an EVD. Neurosurgery will sign off.\"  Continue neuro checks q4H  PT/OT consulted, planning for discharge to ARU   Tele monitor  Needs improvement in blood sugars, have taken over patient's care today and have optimized as able to.  Will need further optimization per hospitalist rounding on 12/20  On subcutaneous Lovenox for DVT prophylaxis, stroke neurology okay.  Plan to have him follow up with cardiology in 1 month to discuss Watchman. Follow-up with stroke neurology in 6-8 weeks      Addendum:   Notified of lactic acid level of 2.3. Patient currently completing course of antibiotics for UTI. He is afebrile, wbc is overall trending down. Will given 500cc bolus and recheck Lactic acid in the afternoon- recheck 1.2     Hyperglycemia from uncontrolled diabetes mellitus.  Episodes of intermittent hypoglycemia  Diabetes mellitus type II HgbA1c 8.1% 12/12/23  H/o diabetic ketoacidosis.  -- Patient's insulin pump has been on hold since admission in the setting of IP.  Blood sugars labile during hospital stay.  Blood glucose 66 (prior to supper last night--ranged  in last 24 hrs  Change mealtime insulin to carb counting--Aspart 1unit/8gram of carb w/breakfast, 1unit/10 grams of carb with lunch/supper  Continue insulin Lantus to 15 units daily [12/19]   sliding scale.  Will need to monitor blood glucose closely and adjust insulin to stabilize BG priror to discharge. Goal is to avoid hypoglycemia, blood glucose levels have been labile in hospital    ADDENDUM: 5pm  - hypoglycemic episode to the 40s this afternoon, being treated with hypoglycemia " protocol.   - will adjust insulin further-->decrease Lantus to 13 units  - change mealtime 1 unit/10grams of carb with breakfast, 1 unit/15grams of carb with lunch/supper, to hold for premeal glucose <100  - discontinue bedtime sliding scale insulin        Mild-moderate oral-pharyngeal dysphagia   SLP following Diet per speech      UTI due to Klebsiella pneumoniae  Possible aspiration pneumonia.  On 12/12 to 12/13 had agitation, confusion.  Had leukocytosis.  Blood cultures  no growth  UA with > 182 WBC/hpf, large leukocyte esterase.   Urine culture has greater than 100,000 CFU/mL Klebsiella pneumoniae  Chest x-ray right greater than left lower lung opacity suspicious for aspiration/infection.  - Was on IV ceftriaxone, subsequently switched to cefuroxime to complete course  - Follow-up imaging of chest in 6 weeks for resolution.  Aggressive incentive spirometry.  Strict aspiration precautions.  - wbc trending down, afebrile       Chronic diastolic CHF  Atrial fibrillation PTA on Eliquis.  Hypertension.  Hyperlipidemia.  Prior to admission Eliquis on hold since admission.  Echocardiogram with EF of 60 to 65%.  Severe biatrial enlargement.  Mild to moderate TR.  Telemetry atrial fibrillation.  LDL 54.  Was on Nicardipine drip on admission.   Per Stroke Neurology, SBP goal is 130-150 mmHg.   Continue amlodipine (added this stay)  Continue PTA carvedilol, avoid rebound tachycardia  Continue Avapro   hold PTA Demadex, cardiology recently advised changing to as needed dosing  As needed labetalol and hydralazine available  Follow-up with cardiology after discharge, discuss watchman.  Telemetry monitoring.    ADDENDUM 210pm:  HR 40-50 this afternoon, patient is asymptomatic. Reduced Coreg to PTA dose of 12.5mg BID and added hold parameters. [facility]     Anemia of chronic disease  Monitor hemoglobin levels closely     Physical deconditioning from medical illness, senile frailty.  PT, OT following.  ARU for  "rehabilitation.     Chronic C7 compression fracture.  Cervical flexion-extension x-ray without any dynamic instability.  Per chart review neurosurgery 12/12 recommending likely healed since his initial imaging 4 years ago.  Given no dynamic instability on x-ray recommend no cervical collar needed.     CKD stage III  *Baseline creatinine around 1 to 1.1 in early 2023, more recently around 1.5  *Creatinine on admission 1.66  Continue Irbesartan  See comments in note from office visit with [place] on 11/20/2023, \"Advised not to start Entresto given renal function, then due to further decline in renal function torsemide changed to PRN.\"  Creatinine stable              Diet: Room Service  Diet  Combination Diet Moderate Consistent Carb (60 g CHO per Meal) Diet; Soft and Bite Sized Diet (level 6); Thin Liquids (level 0) (1:1 assist/supervision, fully upright position, only when alert, small single sips, verify/cue swallows)    DVT Prophylaxis: Enoxaparin (Lovenox) SQ  Pruett Catheter: Not present  Lines: None     Cardiac Monitoring: ACTIVE order. Indication: Tachyarrhythmias, acute (48 hours)  Code Status: No CPR- Do NOT Intubate      Clinically Significant Risk Factors                  # Hypertension: Noted on problem list  # Chronic heart failure with preserved ejection fraction: heart failure noted on problem list and last echo with EF >50%      # DMII: A1C = 8.1 % (Ref range: <5.7 %) within past 6 months       # Financial/Environmental Concerns: none         Disposition Plan  likely in the next 24-48 hrs if blood glucose stable without hypoglycemic event     Expected Discharge Date: 12/21/2023      Destination: home with help/services;inpatient rehabilitation facility              Ruben Turner MD  Hospitalist Service  [facility]  Securely message with [employer] (more info)  Text page via [employer] Paging/Directory "   ______________________________________________________________________    Interval History   Patient states he feels good this morning. Denies nausea or vomiting. He is afebrile.  Low blood glucose 66 yesterday around 1700, then subsequently in the 70s without additional insulin. This am, BG in the 200s    Physical Exam   Vital Signs: Temp: 97.4  F (36.3  C) Temp src: Oral BP: (!) 152/86 Pulse: 73   Resp: 16 SpO2: 97 % O2 Device: None (Room air)    Weight: 132 lbs 7.94 oz    General Appearance: Alert, awake and no apparent distress  Respiratory: clear to ausculation bilaterally, no wheezing  Cardiovascular: regular rate and rhythm  GI: soft and non-tender  Skin: warm and dry      Medical Decision Making       45 MINUTES SPENT BY ME on the date of service doing chart review, history, exam, documentation & further activities per the note.  MANAGEMENT DISCUSSED with the following over the past 24 hours: patient, RN and care transition team   NOTE(S)/MEDICAL RECORDS REVIEWED over the past 24 hours: nursing notes, MAR  Tests ORDERED & REVIEWED in the past 24 hours:  - BMP  - CBC  - blood glucose       Data     I have personally reviewed the following data over the past 24 hrs:    12.1 (H)  \   10.5 (L)   / 265     136 98 25.9 (H) /  263 (H); 265 (H)   3.9 33 (H) 1.52 (H) \     ALT: N/A AST: N/A AP: N/A TBILI: N/A   ALB: N/A TOT PROTEIN: N/A LIPASE: N/A       Imaging results reviewed over the past 24 hrs:   No results found for this or any previous visit (from the past 24 hour(s)).

## 2023-12-21 NOTE — PROGRESS NOTES
There were no new acute events today.  BG is well controlled; no acute distress; blanchable redness on his coccyx; up in the chair and back to bed.  Pills whole with water; up 1/FWW/GB; bradycardia in the 50s; Afib;

## 2023-12-21 NOTE — PLAN OF CARE
Goal Outcome Evaluation:  Summary: IPH (Intraparenchymal hematoma)  Date & Time: 12/20/23 2506-8389  Orientation: A&Ox4  Activity Level: A1 GB/W  Fall Risk: Y  Behavior & Aggression: Green  Pain Management: denies  ABNL VS/O2/Neuro's: Vss on RA, SBP goal is 130-150. Neuro's intact, slight L facial droop, some garbled speech, LUE weakness  Tele: NSR  ABNL Lab/BG: ,   Diet: Combo/Mod Carb, Soft and Bite Sized Diet,Thin Liquids    Bowel/Bladder: can be incontinent B&B  Drains/Devices: R PIV SL  Tests/Procedures: -  Anticipated DC Date: pending BG stabilization:[place] [facility]  Other Important Info: 1:1 assist/supervision, fully upright position, only when alert, small single sips, verify/cue swallows

## 2023-12-21 NOTE — PLAN OF CARE
"/76 (BP Location: Right arm)   Pulse 71   Temp 97  F (36.1  C) (Oral)   Resp 16   Ht 1.753 m (5' 9\")   Wt 60.1 kg (132 lb 7.9 oz)   SpO2 99%   BMI 19.57 kg/m      Patient name: Tc Garcia    Summary: Patient in with IPH, neuros intact except for slight left ataxia, left facial droop, aox4, did have episode of hypoglycemia today with blood sugar at 43, glucose gel adminstered per protocol, A1 GB walker, tele is in 50s.    Senthil Singh, RN  [facility]  [phone]    "

## 2023-12-21 NOTE — PROGRESS NOTES
[facility]    Medicine Progress Note - Hospitalist Service    Date of Admission:  12/11/2023    Assessment & Plan   Tc Garica is a 83 year old male with diabetes mellitus type 2 (on insulin pump, h/o DKA), paroxysmal atrial fibrillation (on Eliquis), hypertension, hyperlipidemia, pulmonary hypertension, h/o spontaneous intracranial hemorrhage, recurrent pleural effusion, chronic kidney disease stage 3, BPH and anemia of chronic disease mated on 12/11/2023 with dizziness, fall and head injury.       Intraparenchymal hematoma    Mild hydrocephalus   Elevated troponin on admission likely in the setting of demand ischemia from fall.  H/o spontaneous intracranial hemorrhage (1/2023)  paroxysmal atrial fibrillation (on eliquis prior to admission)  Presented with dizziness diplopia and nausea.  *HCT with 1.5 X 3 cm intraparenchymal hematoma centered in inferior cerebellar vermis with likely extension into fourth ventricle; no hydrocephalus  *CTA head and neck noted some atherosclerotic plaque with questionable active contrast extravasation into the hematoma  *CT cervical spine noted with compression fracture deformities C7 with slight further loss of height since comparison study and noted with mild degenerative changes  *MRI brain with unchanged left cerebellar intraparenchymal hematoma with intraventricular extension, small volume subarachnoid hemorrhage involving the bilateral cerebral sulci, 0.1 x 0.3 cm area of diffusion restriction along medial left frontal lobe which may represent acute infarct, punctate foci of chronic microhemorrhage in bilateral cerebellar hemispheres  Echocardiogram with EF of 60 to 65%.  Severe biatrial enlargement.  Mild to moderate TR.  Telemetry atrial fibrillation.  LDL 54.  Troponin 34 >30  --- Etiology for intraparenchymal hematoma likely hypertensive and in the setting of chronic anticoagulation with Eliquis versus traumatic after sudden onset of dizziness  "and fall.  Anticoagulation was reversed with Kcentra in ED  Admitted to ICU initially for close monitoring  Stroke neurology Followed: \"HOLD anticoagulation. Not an ASPIRE candidate given ICH within the past year.   Systolic BP Goal: 130-150.    Neurosurgery followed, \"Patient is DNR/DNI, does not wish any intervention from neurosurgery, particular surgical intervention or an EVD. Neurosurgery will sign off.\"  Continue neuro checks q4H  PT/OT consulted, planning for discharge to ARU   Tele monitor  Needs improvement in blood sugars, have taken over patient's care today and have optimized as able to.  Will need further optimization per hospitalist rounding on 12/20  On subcutaneous Lovenox for DVT prophylaxis, stroke neurology okay.  Plan to have him follow up with cardiology in 1 month to discuss Watchman. Follow-up with stroke neurology in 6-8 weeks     Notified of lactic acid level on 12/20/2023 of 2.3. Patient currently completing course of antibiotics for UTI. He is afebrile, wbc is overall trending down. Will given 500cc bolus and recheck Lactic acid in the afternoon- recheck 1.2     Hyperglycemia from uncontrolled diabetes mellitus.  Episodes of intermittent hypoglycemia  Diabetes mellitus type II HgbA1c 8.1% 12/12/23  H/o diabetic ketoacidosis.  -- Patient's insulin pump has been on hold since admission in the setting of [facility].  Blood sugars labile during hospital stay.  Blood glucose 66 (prior to supper last night--ranged  in last 24 hrs  Change mealtime insulin to carb counting--Aspart 1unit/8gram of carb w/breakfast, 1unit/10 grams of carb with lunch/supper  Continue insulin Lantus to 15 units daily [12/19]   sliding scale.  Will need to monitor blood glucose closely and adjust insulin to stabilize BG priror to discharge. Goal is to avoid hypoglycemia, blood glucose levels have been labile in hospital    On 12/20/2023  - hypoglycemic episode to the 40s this afternoon, being treated with hypoglycemia " protocol.   - will adjust insulin further-->decrease Lantus to 13 units  - change mealtime 1 unit/10grams of carb with breakfast, 1 unit/15grams of carb with lunch/supper, to hold for premeal glucose <100  - discontinue bedtime sliding scale insulin     Reduce Lantus dosing to 10 units every morning and monitor blood sugars closely on 12/21/2023       Mild-moderate oral-pharyngeal dysphagia   SLP following Diet per speech      UTI due to Klebsiella pneumoniae  Possible aspiration pneumonia.  On 12/12 to 12/13 had agitation, confusion.  Had leukocytosis.  Blood cultures  no growth  UA with > 182 WBC/hpf, large leukocyte esterase.   Urine culture has greater than 100,000 CFU/mL Klebsiella pneumoniae  Chest x-ray right greater than left lower lung opacity suspicious for aspiration/infection.  - Was on IV ceftriaxone, subsequently switched to cefuroxime to complete course  - Follow-up imaging of chest in 6 weeks for resolution.  Aggressive incentive spirometry.  Strict aspiration precautions.  - wbc trending down, afebrile       Chronic diastolic CHF  Atrial fibrillation PTA on Eliquis.  Hypertension.  Hyperlipidemia.  Prior to admission Eliquis on hold since admission.  Echocardiogram with EF of 60 to 65%.  Severe biatrial enlargement.  Mild to moderate TR.  Telemetry atrial fibrillation.  LDL 54.  Was on Nicardipine drip on admission.   Per Stroke Neurology, SBP goal is 130-150 mmHg.   Continue amlodipine (added this stay)  Continue PTA carvedilol, avoid rebound tachycardia  Continue Avapro   hold PTA Demadex, cardiology recently advised changing to as needed dosing  As needed labetalol and hydralazine available  Follow-up with cardiology after discharge, discuss watchman.  Telemetry monitoring.  On 12/20/2023  HR 40-50 this afternoon, patient is asymptomatic. Reduced Coreg to PTA dose of 12.5mg BID and added hold parameters. Continue tele montior    Patient on PTA dose of Coreg heart rate in the 40s to 50s at times  "but patient is asymptomatic okay to continue current dosing of Coreg    Blood pressure increases early in the morning so added Imdur 60 mg at bedtime     Anemia of chronic disease  Monitor hemoglobin levels closely     Physical deconditioning from medical illness, senile frailty.  PT, OT following.  ARU for rehabilitation.     Chronic C7 compression fracture.  Cervical flexion-extension x-ray without any dynamic instability.  Per chart review neurosurgery 12/12 recommending likely healed since his initial imaging 4 years ago.  Given no dynamic instability on x-ray recommend no cervical collar needed.     CKD stage III  *Baseline creatinine around 1 to 1.1 in early 2023, more recently around 1.5  *Creatinine on admission 1.66  Continue Irbesartan  See comments in note from office visit with Lacie Tapia on 11/20/2023, \"Advised not to start Entresto given renal function, then due to further decline in renal function torsemide changed to PRN.\"  Creatinine stable              Diet: Room Service  Diet  Combination Diet Moderate Consistent Carb (60 g CHO per Meal) Diet; Minced and Moist Diet (level 5); Thin Liquids (level 0) (1:1 assist/supervision, fully upright position, only when alert, small single sips, verify/cue swallows)    DVT Prophylaxis: Enoxaparin (Lovenox) SQ  Pruett Catheter: Not present  Lines: None     Cardiac Monitoring: ACTIVE order. Indication: Tachyarrhythmias, acute (48 hours)  Code Status: No CPR- Do NOT Intubate      Clinically Significant Risk Factors                  # Hypertension: Noted on problem list  # Chronic heart failure with preserved ejection fraction: heart failure noted on problem list and last echo with EF >50%      # DMII: A1C = 8.1 % (Ref range: <5.7 %) within past 6 months       # Financial/Environmental Concerns: none         Disposition Plan  likely in the next 24-48 hrs if blood glucose stable without hypoglycemic event     Expected Discharge Date: 12/22/2023    Discharge Delays: " Placement - LTAC/ARU  Destination: home with help/services;inpatient rehabilitation facility              Rosalba Medel MD  Hospitalist Service  [facility]  Securely message with [employer] (more info)  Text page via [employer] Paging/Directory   ______________________________________________________________________    Interval History     Patient is new to me today.  Chart reviewed.  Discussed with bedside RN  Patient states he feels good this morning. Denies nausea or vomiting. He is afebrile.  Resting comfortably in bed.  Wants to discharge from the hospital.  Discussed with him about the importance of monitoring blood pressure and having safe discharge disposition plan  No acute issues in the last 24 hours other than the ones noted in progress note    Physical Exam   Vital Signs: Temp: 97.6  F (36.4  C) Temp src: Oral BP: 125/71 Pulse: 53   Resp: 16 SpO2: 100 % O2 Device: None (Room air)    Weight: 132 lbs 7.94 oz    General Appearance: Alert, awake and no apparent distress  Respiratory: clear to ausculation bilaterally, no wheezing  Cardiovascular: regular rate and rhythm  GI: soft and non-tender  Skin: warm and dry      Medical Decision Making       51 MINUTES SPENT BY ME on the date of service doing chart review, history, exam, documentation & further activities per the note.  MANAGEMENT DISCUSSED with the following over the past 24 hours: patient, RN and care transition team   NOTE(S)/MEDICAL RECORDS REVIEWED over the past 24 hours: nursing notes, MAR  Tests ORDERED & REVIEWED in the past 24 hours:  - BMP  - CBC  - blood glucose      Data     I have personally reviewed the following data over the past 24 hrs:    N/A  \   N/A   / 285     135 96 (L) 19.9 /  178 (H)   4.0 31 (H) 1.45 (H) \     Procal: N/A CRP: N/A Lactic Acid: 1.8         Imaging results reviewed over the past 24 hrs:   No results found for this or any previous visit (from the past 24 hour(s)).

## 2023-12-22 NOTE — PROVIDER NOTIFICATION
MD Notification    Notified Person: MD    Notified Person Name: Rosalba Medel    Notification Date/Time: 12/22/23 9845    Notification Interaction: [employer] Messaging    Purpose of Notification: , insulin pump not at the hospital yet    Orders Received: 8 units Lantus stat, 8 unit novolog stat, update with BG in an hour    Comments:

## 2023-12-22 NOTE — PROGRESS NOTES
[facility]    Medicine Progress Note - Hospitalist Service    Date of Admission:  12/11/2023    Assessment & Plan   Tc Garcia is a 83 year old male with diabetes mellitus type 2 (on insulin pump, h/o DKA), paroxysmal atrial fibrillation (on Eliquis), hypertension, hyperlipidemia, pulmonary hypertension, h/o spontaneous intracranial hemorrhage, recurrent pleural effusion, chronic kidney disease stage 3, BPH and anemia of chronic disease mated on 12/11/2023 with dizziness, fall and head injury.       Intraparenchymal hematoma    Mild hydrocephalus   Elevated troponin on admission likely in the setting of demand ischemia from fall.  H/o spontaneous intracranial hemorrhage (1/2023)  paroxysmal atrial fibrillation (on eliquis prior to admission)  Presented with dizziness diplopia and nausea.  *HCT with 1.5 X 3 cm intraparenchymal hematoma centered in inferior cerebellar vermis with likely extension into fourth ventricle; no hydrocephalus  *CTA head and neck noted some atherosclerotic plaque with questionable active contrast extravasation into the hematoma  *CT cervical spine noted with compression fracture deformities C7 with slight further loss of height since comparison study and noted with mild degenerative changes  *MRI brain with unchanged left cerebellar intraparenchymal hematoma with intraventricular extension, small volume subarachnoid hemorrhage involving the bilateral cerebral sulci, 0.1 x 0.3 cm area of diffusion restriction along medial left frontal lobe which may represent acute infarct, punctate foci of chronic microhemorrhage in bilateral cerebellar hemispheres  Echocardiogram with EF of 60 to 65%.  Severe biatrial enlargement.  Mild to moderate TR.  Telemetry atrial fibrillation.  LDL 54.  Troponin 34 >30  --- Etiology for intraparenchymal hematoma likely hypertensive and in the setting of chronic anticoagulation with Eliquis versus traumatic after sudden onset of dizziness  "and fall.  Anticoagulation was reversed with Kcentra in ED  Admitted to ICU initially for close monitoring  Stroke neurology Followed: \"HOLD anticoagulation. Not an ASPIRE candidate given ICH within the past year.   Systolic BP Goal: 130-150.    Neurosurgery followed, \"Patient is DNR/DNI, does not wish any intervention from neurosurgery, particular surgical intervention or an EVD. Neurosurgery will sign off.\"  Continue neuro checks q4H  PT/OT consulted, planning for discharge to ARU   Tele monitor  Needs improvement in blood sugars, have taken over patient's care today and have optimized as able to.  Will need further optimization per hospitalist rounding on 12/20  On subcutaneous Lovenox for DVT prophylaxis, stroke neurology okay.  Plan to have him follow up with cardiology in 1 month to discuss Watchman. Follow-up with stroke neurology in 6-8 weeks     Notified of lactic acid level on 12/20/2023 of 2.3. Patient currently completing course of antibiotics for UTI. He is afebrile, wbc is overall trending down. Will given 500cc bolus and recheck Lactic acid in the afternoon- recheck 1.2     Hyperglycemia from uncontrolled diabetes mellitus.  Episodes of intermittent hypoglycemia  Diabetes mellitus type II HgbA1c 8.1% 12/12/23  H/o diabetic ketoacidosis.  -- Patient's insulin pump has been on hold since admission in the setting of [facility].  Blood sugars labile during hospital stay.  Blood glucose 66 (prior to supper last night--ranged  in last 24 hrs  Change mealtime insulin to carb counting--Aspart 1unit/8gram of carb w/breakfast, 1unit/10 grams of carb with lunch/supper  Continue insulin Lantus to 15 units daily [12/19]   sliding scale.  Will need to monitor blood glucose closely and adjust insulin to stabilize BG priror to discharge. Goal is to avoid hypoglycemia, blood glucose levels have been labile in hospital    On 12/20/2023  - hypoglycemic episode to the 40s this afternoon, being treated with hypoglycemia " protocol.   - will adjust insulin further-->decrease Lantus to 13 units  - change mealtime 1 unit/10grams of carb with breakfast, 1 unit/15grams of carb with lunch/supper, to hold for premeal glucose <100  - discontinue bedtime sliding scale insulin     Reduce Lantus dosing to 10 units every morning and monitor blood sugars closely on 12/21/2023    Blood sugar was again low at 67 yesterday will restart his subcutaneous insulin pump as pt is competent to manage his pump        Mild-moderate oral-pharyngeal dysphagia   SLP following Diet per speech      UTI due to Klebsiella pneumoniae  Possible aspiration pneumonia.  On 12/12 to 12/13 had agitation, confusion.  Had leukocytosis.  Blood cultures  no growth  UA with > 182 WBC/hpf, large leukocyte esterase.   Urine culture has greater than 100,000 CFU/mL Klebsiella pneumoniae  Chest x-ray right greater than left lower lung opacity suspicious for aspiration/infection.  - Was on IV ceftriaxone, subsequently switched to cefuroxime to complete course  - Follow-up imaging of chest in 6 weeks for resolution.  Aggressive incentive spirometry.  Strict aspiration precautions.  - wbc trending down, afebrile    Insomnia:  Pt takes 10mg melatonin at bedtime   Order placed        Chronic diastolic CHF  Atrial fibrillation PTA on Eliquis.  Hypertension.  Hyperlipidemia.  Prior to admission Eliquis on hold since admission.  Echocardiogram with EF of 60 to 65%.  Severe biatrial enlargement.  Mild to moderate TR.  Telemetry atrial fibrillation.  LDL 54.  Was on Nicardipine drip on admission.   Per Stroke Neurology, SBP goal is 130-150 mmHg.   Continue amlodipine (added this stay)  Continue PTA carvedilol, avoid rebound tachycardia  Continue Avapro   hold PTA Demadex, cardiology recently advised changing to as needed dosing  As needed labetalol and hydralazine available  Follow-up with cardiology after discharge, discuss watchman.  Telemetry monitoring.  On 12/20/2023  HR 40-50 this  "afternoon, patient is asymptomatic. Reduced Coreg to PTA dose of 12.5mg BID and added hold parameters. Continue tele montior    Patient on PTA dose of Coreg heart rate in the 40s to 50s at times but patient is asymptomatic okay to continue current dosing of Coreg    Blood pressure increases early in the morning so added Imdur 60 mg at bedtime     Anemia of chronic disease  Monitor hemoglobin levels closely     Physical deconditioning from medical illness, senile frailty.  PT, OT following.  ARU for rehabilitation.     Chronic C7 compression fracture.  Cervical flexion-extension x-ray without any dynamic instability.  Per chart review neurosurgery 12/12 recommending likely healed since his initial imaging 4 years ago.  Given no dynamic instability on x-ray recommend no cervical collar needed.     CKD stage III  *Baseline creatinine around 1 to 1.1 in early 2023, more recently around 1.5  *Creatinine on admission 1.66  Continue Irbesartan  See comments in note from office visit with Lacie Tapia on 11/20/2023, \"Advised not to start Entresto given renal function, then due to further decline in renal function torsemide changed to PRN.\"  Creatinine stable              Diet: Room Service  Diet  Combination Diet Moderate Consistent Carb (60 g CHO per Meal) Diet; Minced and Moist Diet (level 5); Thin Liquids (level 0) (1:1 assist/supervision, fully upright position, only when alert, small single sips, verify/cue swallows)    DVT Prophylaxis: Enoxaparin (Lovenox) SQ  Pruett Catheter: Not present  Lines: None     Cardiac Monitoring: ACTIVE order. Indication: Tachyarrhythmias, acute (48 hours)  Code Status: No CPR- Do NOT Intubate      Clinically Significant Risk Factors                  # Hypertension: Noted on problem list  # Chronic heart failure with preserved ejection fraction: heart failure noted on problem list and last echo with EF >50%      # DMII: A1C = 8.1 % (Ref range: <5.7 %) within past 6 months       # " Financial/Environmental Concerns: none         Disposition Plan  likely in the next 24-48 hrs if blood glucose stable without hypoglycemic event     Expected Discharge Date: 12/23/2023    Discharge Delays: Placement - LTAC/ARU  Destination: home with help/services;inpatient rehabilitation facility              Rosalba Medel MD  Hospitalist Service  [facility]  Securely message with [employer] (more info)  Text page via [employer] Paging/Directory   ______________________________________________________________________    Interval History      Chart reviewed.  Discussed with bedside RN  Patient states he feels good this morning. Denies nausea or vomiting. He is afebrile.  Resting comfortably in bed.  Blood sugars are variable. No acute issues in the last 24 hours other than the ones noted in progress note    Physical Exam   Vital Signs: Temp: 97.8  F (36.6  C) Temp src: Oral BP: 111/73 Pulse: 73   Resp: 18 SpO2: 99 % O2 Device: None (Room air)    Weight: 132 lbs 7.94 oz    General Appearance: Alert, awake and no apparent distress  Respiratory: clear to ausculation bilaterally, no wheezing  Cardiovascular: regular rate and rhythm  GI: soft and non-tender  Skin: warm and dry      Medical Decision Making       51 MINUTES SPENT BY ME on the date of service doing chart review, history, exam, documentation & further activities per the note.  MANAGEMENT DISCUSSED with the following over the past 24 hours: patient, RN and care transition team   NOTE(S)/MEDICAL RECORDS REVIEWED over the past 24 hours: nursing notes, MAR  Tests ORDERED & REVIEWED in the past 24 hours:  - BMP  - CBC  - blood glucose      Data     I have personally reviewed the following data over the past 24 hrs:    N/A  \   N/A   / N/A     135 95 (L) 22.8 /  472 (H)   4.8 31 (H) 1.46 (H) \       Imaging results reviewed over the past 24 hrs:   No results found for this or any previous visit (from the past 24 hour(s)).

## 2023-12-22 NOTE — PROGRESS NOTES
There was delay in starting patient on insulin pump as family took pump home. Pt was given lantus 8units and Novolog 8units 1 time dose . Another dose of Novolog 6units given now. Family brought in the pump will be starting him on it soon as per bedside RN. Will continue to monitor blood sugar closely . As per the patient his blood sugars do very well and controlled on the subcutaneous  pump     Discused with the floor Covering Pharmacist whobrecommended 1 time dose of regular insulin IV and continuing SSI and carb counts tonight and starting insulin subcutaneous pump first thing in the morning at 7AM     Rosalba Medel MD

## 2023-12-22 NOTE — PLAN OF CARE
Summary: IPH (Intraparenchymal hematoma)  Date & Time: 12/21/23 4323-0049  Orientation: A&Ox4  Activity Level: A1 GB/W  Fall Risk: Y  Behavior & Aggression: Green  Pain Management: denies  ABNL VS/O2/Neuro's: Vss on RA . Neuro's intact    ABNL Lab/BG:   Diet: Combo/Mod Carb, Minced and Moist,Thin Liquids    Bowel/Bladder: can be incontinent B&B; continent this shift  Drains/Devices: R PIV SL  Tests/Procedures: -  Anticipated DC Date: pending BG stabilization: [place] [facility]  Other Important Info: small single sips,

## 2023-12-22 NOTE — PLAN OF CARE
Goal Outcome Evaluation:         Summary: IPH (Intraparenchymal hematoma)  Date & Time: 12/21/23 - 15:00-19:30  Orientation: A&Ox4  Activity Level: A1 GB/W  Fall Risk: Y  Behavior & Aggression: Green  Pain Management: denies  ABNL VS/O2/Neuro's: Vss on RA, SBP goal is 130-150. Neuro's intact, slight L facial droop, some garbled speech, LUE weakness  Tele: Afib w/ CVR  ABNL Lab/BG:  Diet: Mod Carb, Minced and Moist,Thin Liquids    Bowel/Bladder: Continent on this shift, but can be incontinent at times  Drains/Devices: R PIV SL  Tests/Procedures: -  Anticipated DC Date: pending BG stabilization:[facility]  Other Important Info: 1:1 assist/supervision, fully upright position, only when alert, small single sips, verify/cue swallows

## 2023-12-22 NOTE — PROGRESS NOTES
Care Management Follow Up    Length of Stay (days): 11    Expected Discharge Date: 12/23/2023     Concerns to be Addressed: discharge planning     Patient plan of care discussed at interdisciplinary rounds: Yes    Anticipated Discharge Disposition: acute rehab      Referrals Placed by CM/BERTHA:  ARU   Private pay costs discussed: Not applicable    Additional Information:  BERTHA spoke to Rupa in admissions at  ARU and the hospitalist, who both determined patient's blood sugars are not controlled and he is not medically ready to discharge today.    Anel Joyner, LEV, LGSW   Social Work   [facility]

## 2023-12-22 NOTE — PROGRESS NOTES
MD Notification    Notified Person: MD    Notified Person Name: Dr. Mancilla    Notification Date/Time: 12/21/23 @ 9:47pm    Notification Interaction: Kenan    Purpose of Notification:  Patient takes 10 mg melatonin at home at 10pm before bed. Why was this order discontinued and trazodone ordered instead? Patient is to discharge tomorrow. Lana Del Castillo, [phone]     He has a routine of 10mg at 10pm and then 10 mg at 2am, he doesn't sleep otherwise.    Orders Received:  Ok, if that's what he prefers feel free to verbal under me. I'm the covering hospitalist, I'm not sure what changes they made today.     Comments:

## 2023-12-23 NOTE — PLAN OF CARE
Pt here with IPH. A&Ox4. Neuros intact ex, slight L droop, garbled speech, LUE weakness. VSS. Tele Afib CVR. Minced and moist diet, thin liquids; 1:1 supervision to eat. Takes pills whole. Blanchable redness on sacrum; Meplex changed, scattered bruising and scabs. Skin abrasion on L wrist, meplex applied. , 281 & 278; Insulin given per MAR in AM. Insulin pump now attached, pt able to bolus self with pump. Up with Ax1 GBW. Denies pain. Pt scoring green on the Aggression Stop Light Tool. Plan to have patient manage insulin pump. Discharge pending BG stabilization.

## 2023-12-23 NOTE — PROGRESS NOTES
[facility]    Medicine Progress Note - Hospitalist Service    Date of Admission:  12/11/2023    Assessment & Plan   Tc Garcia is a 83 year old male with diabetes mellitus type 2 (on insulin pump, h/o DKA), paroxysmal atrial fibrillation (on Eliquis), hypertension, hyperlipidemia, pulmonary hypertension, h/o spontaneous intracranial hemorrhage, recurrent pleural effusion, chronic kidney disease stage 3, BPH and anemia of chronic disease mated on 12/11/2023 with dizziness, fall and head injury.       Intraparenchymal hematoma    Mild hydrocephalus   Elevated troponin on admission likely in the setting of demand ischemia from fall.  H/o spontaneous intracranial hemorrhage (1/2023)  paroxysmal atrial fibrillation (on eliquis prior to admission)  Presented with dizziness diplopia and nausea.  *HCT with 1.5 X 3 cm intraparenchymal hematoma centered in inferior cerebellar vermis with likely extension into fourth ventricle; no hydrocephalus  *CTA head and neck noted some atherosclerotic plaque with questionable active contrast extravasation into the hematoma  *CT cervical spine noted with compression fracture deformities C7 with slight further loss of height since comparison study and noted with mild degenerative changes  *MRI brain with unchanged left cerebellar intraparenchymal hematoma with intraventricular extension, small volume subarachnoid hemorrhage involving the bilateral cerebral sulci, 0.1 x 0.3 cm area of diffusion restriction along medial left frontal lobe which may represent acute infarct, punctate foci of chronic microhemorrhage in bilateral cerebellar hemispheres  Echocardiogram with EF of 60 to 65%.  Severe biatrial enlargement.  Mild to moderate TR.  Telemetry atrial fibrillation.  LDL 54.  Troponin 34 >30  --- Etiology for intraparenchymal hematoma likely hypertensive and in the setting of chronic anticoagulation with Eliquis versus traumatic after sudden onset of dizziness  "and fall.  Anticoagulation was reversed with Kcentra in ED  Admitted to ICU initially for close monitoring  Stroke neurology Followed: \"HOLD anticoagulation. Not an ASPIRE candidate given ICH within the past year.   Systolic BP Goal: 130-150.    Neurosurgery followed, \"Patient is DNR/DNI, does not wish any intervention from neurosurgery, particular surgical intervention or an EVD. Neurosurgery will sign off.\"  Continue neuro checks q4H  PT/OT consulted, planning for discharge to ARU   Tele monitor  On subcutaneous Lovenox for DVT prophylaxis, stroke neurology okay.  Plan to have him follow up with cardiology in 1 month to discuss Watchman. Follow-up with stroke neurology in 6-8 weeks   Patient now has his insulin pump with patch, hopefully his blood sugars will be better controlled with this and he can discharge to ARU.    Notified of lactic acid level on 12/20/2023 of 2.3. Patient currently completing course of antibiotics for UTI. He is afebrile, wbc is overall trending down. Will given 500cc bolus and recheck Lactic acid in the afternoon- recheck 1.2     Hyperglycemia from uncontrolled diabetes mellitus.  Episodes of intermittent hypoglycemia  Diabetes mellitus type II HgbA1c 8.1% 12/12/23  H/o diabetic ketoacidosis.  -- Patient's insulin pump has been on hold since admission in the setting of IPH.  Blood sugars labile during hospital stay.  --Insulin pump now reinstated 12/23/2023.  Patient now managing his own blood sugars.       Mild-moderate oral-pharyngeal dysphagia   SLP following Diet per speech      UTI due to Klebsiella pneumoniae  Possible aspiration pneumonia.  On 12/12 to 12/13 had agitation, confusion.  Had leukocytosis.  Blood cultures  no growth  UA with > 182 WBC/hpf, large leukocyte esterase.   Urine culture has greater than 100,000 CFU/mL Klebsiella pneumoniae  Chest x-ray right greater than left lower lung opacity suspicious for aspiration/infection.  - Was on IV ceftriaxone, subsequently " "switched to cefuroxime to complete course  - Follow-up imaging of chest in 6 weeks for resolution.  Aggressive incentive spirometry.  Strict aspiration precautions.  - wbc trending down, afebrile    Insomnia:  Pt takes 10mg melatonin at bedtime   Order placed        Chronic diastolic CHF  Atrial fibrillation PTA on Eliquis.  Hypertension.  Hyperlipidemia.  Prior to admission Eliquis on hold since admission.  Echocardiogram with EF of 60 to 65%.  Severe biatrial enlargement.  Mild to moderate TR.  Telemetry atrial fibrillation.  LDL 54.  Was on Nicardipine drip on admission.   Per Stroke Neurology, SBP goal is 130-150 mmHg.   Continue amlodipine (added this stay)  Continue PTA carvedilol, avoid rebound tachycardia  Continue Avapro   hold PTA Demadex, cardiology recently advised changing to as needed dosing  As needed labetalol and hydralazine available  Follow-up with cardiology after discharge, discuss watchman.     Anemia of chronic disease  Monitor hemoglobin levels closely     Physical deconditioning from medical illness, senile frailty.  PT, OT following.  ARU for rehabilitation.     Chronic C7 compression fracture.  Cervical flexion-extension x-ray without any dynamic instability.  Per chart review neurosurgery 12/12 recommending likely healed since his initial imaging 4 years ago.  Given no dynamic instability on x-ray recommend no cervical collar needed.     CKD stage III  *Baseline creatinine around 1 to 1.1 in early 2023, more recently around 1.5  *Creatinine on admission 1.66  Continue Irbesartan  See comments in note from office visit with Lacie Tapia on 11/20/2023, \"Advised not to start Entresto given renal function, then due to further decline in renal function torsemide changed to PRN.\"  Creatinine stable              Diet: Room Service  Diet  Combination Diet Moderate Consistent Carb (60 g CHO per Meal) Diet; Minced and Moist Diet (level 5); Thin Liquids (level 0) (1:1 assist/supervision, fully " upright position, only when alert, small single sips, verify/cue swallows)    DVT Prophylaxis: Enoxaparin (Lovenox) SQ  Pruett Catheter: Not present  Lines: None     Cardiac Monitoring: ACTIVE order. Indication: Tachyarrhythmias, acute (48 hours)  Code Status: No CPR- Do NOT Intubate      Clinically Significant Risk Factors                  # Hypertension: Noted on problem list  # Chronic heart failure with preserved ejection fraction: heart failure noted on problem list and last echo with EF >50%      # DMII: A1C = 8.1 % (Ref range: <5.7 %) within past 6 months       # Financial/Environmental Concerns: none         Disposition Plan     Expected Discharge Date: 12/23/2023    Discharge Delays: Placement - LTAC/ARU  Destination: home with help/services;inpatient rehabilitation facility              Tata Reardon MD  Hospitalist Service  [facility]  Securely message with [employer] (more info)  Text page via [email or web address] Paging/Directory   ______________________________________________________________________    Interval History   Patient sitting in chair, has no complaints at this time.  Insulin pump brought in by family, now restarted on this.  Continue to monitor blood glucose.  No other complaints.    Physical Exam   Vital Signs: Temp: 98.4  F (36.9  C) Temp src: Oral BP: 126/68 Pulse: 76   Resp: 16 SpO2: 99 % O2 Device: None (Room air)    Weight: 132 lbs 7.94 oz    General Appearance: Well appearing for stated age.  Respiratory: CTAB, no rales or ronchi  Cardiovascular: S1, S2 normal, no murmurs  GI: non-tender on palpation, BS present      Medical Decision Making       55 MINUTES SPENT BY ME on the date of service doing chart review, history, exam, documentation & further activities per the note.      Data     I have personally reviewed the following data over the past 24 hrs:    15.6 (H)  \   10.7 (L)   / 344     137 98 28.7 (H) /  281 (H)   4.6 30 (H) 1.64 (H) \       Imaging results reviewed over  the past 24 hrs:   No results found for this or any previous visit (from the past 24 hour(s)).

## 2023-12-23 NOTE — PLAN OF CARE
Pt here with IPH. A&Ox4. Neuros intact ex, slight L droop at rest, garbled speech, LUE weakness. VSS. Tele Afib CVR. Minced and moist diet, thin liquids; 1:1 supervision to eat. Takes pills whole. Blanchable redness on sacrum, scattered bruising and scabs. , 557, 509, & 504. Insulin given per MD orders; Insulin pump being brought from home, apply tomorrow at 7AM. Up with Ax1 GBW. Denies pain. Pt scoring green on the Aggression Stop Light Tool. Plan to control blood sugar, starting insulin pump. Discharge pending BG stabilization. Plan to get new BG at 7:30 and message it to Lizy FARRIS.

## 2023-12-23 NOTE — PROGRESS NOTES
Minnie Dueñas RN on 12/23/2023 at 6:00 AM    Reason for Admission: IPH  Cognitive/Mentation: A/Ox 4  Neuros/CMS: Intact ex LUE weakness  VS: VSS on RA.   Tele: afib CVR.  GI: BS active, passing flatus, last BM 12/22/23. Continent.  :  Continent.  Pulmonary: LS clear.  Pain: denies.   Drains/Lines: R PIV SL  Skin: blanchable redness on sacrum. Scattered bruising.   Activity: Assist x 1 with gbw.  Diet: Minced and moist diet, thin liquids. Takes pills whole. Patients BG was 34 at 0300. Apple juice and IV dextrose given. BG went back up to 110.   Therapies recs: ARU   Discharge: pending BG stabilization   Aggression Stoplight Tool: green  End of shift summary: Insulin pump being brought from home by son. Start insulin pump in AM.

## 2023-12-23 NOTE — PROGRESS NOTES
Speech Language Therapy Discharge Summary    Reason for therapy discharge:    All goals and outcomes met, no further needs identified.    Progress towards therapy goal(s). See goals on Care Plan in [facility] electronic health record for goal details.  Goals met    Therapy recommendation(s):    Continued therapy is recommended.  Rationale/Recommendations:  patient could benefit from cognitive-linguistic evaluation as well as ongoing SLP for swallow to determine safest diet with use of dentures.

## 2023-12-24 PROBLEM — I61.5 NONTRAUMATIC INTRAVENTRICULAR INTRACEREBRAL HEMORRHAGE, UNSPECIFIED LATERALITY (H): Status: ACTIVE | Noted: 2023-01-01

## 2023-12-24 PROBLEM — N30.00 ACUTE CYSTITIS WITHOUT HEMATURIA: Status: ACTIVE | Noted: 2023-01-01

## 2023-12-24 PROBLEM — M79.609 PAIN IN EXTREMITY, UNSPECIFIED EXTREMITY: Status: ACTIVE | Noted: 2023-01-01

## 2023-12-24 PROBLEM — K59.00 CONSTIPATION, UNSPECIFIED CONSTIPATION TYPE: Status: ACTIVE | Noted: 2023-01-01

## 2023-12-24 PROBLEM — Z79.4 TYPE 2 DIABETES MELLITUS WITH OTHER SPECIFIED COMPLICATION, WITH LONG-TERM CURRENT USE OF INSULIN (H): Status: ACTIVE | Noted: 2023-01-01

## 2023-12-24 PROBLEM — E11.69 TYPE 2 DIABETES MELLITUS WITH OTHER SPECIFIED COMPLICATION, WITH LONG-TERM CURRENT USE OF INSULIN (H): Status: ACTIVE | Noted: 2023-01-01

## 2023-12-24 NOTE — PLAN OF CARE
Minnie Dueñas RN on 12/24/2023 at 6:00 AM     Reason for Admission: IPH  Cognitive/Mentation: A/Ox 4  Neuros/CMS: Intact ex LUE weakness  VS: VSS on RA.   Tele: afib CVR.  GI: BS active, passing flatus, last BM 12/23/23. Continent.  :  Continent.  Pulmonary: LS clear.  Pain: denies.   Drains/Lines: R PIV SL  Skin: blanchable redness on sacrum. Scattered bruising.   Activity: Assist x 1 with gbw.  Diet: Minced and moist diet, thin liquids. Takes pills whole. BG checks q4.   Therapies recs: ARU   Discharge: pending BG stabilization   Aggression Stoplight Tool: green  End of shift summary: Insulin pump PRN per patient.
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
## 2023-12-24 NOTE — PLAN OF CARE
Cares preformed from [id number]. Pt here with IPH and DKA. Pt is lethargic, opens eyes to speech. Oriented x4. Neuros intact ex, slight L droop, garbled speech, face numbness and generalized weakness. VSS. Tele atrial flutter. Minced and moist diet, thin liquids; 1:1 supervision to eat. Takes pills whole. Blanchable redness on sacrum; Meplex changed, scattered bruising and scabs. Skin abrasion on L wrist, meplex applied. Patient has 5.6 ketones and BG max was 568. Ambulatory pump was discontinued and insulin drip was start. Up with Ax1 GBW. Denies pain, but complains of feeling sick, pt unable to explain further. Pt scoring green on the Aggression Stop Light Tool. Patient [facility] at 1050.

## 2023-12-24 NOTE — PROVIDER NOTIFICATION
"MD Notification    Notified Person: MD    Notified Person Name: Fiorella Wheatley    Notification Date/Time: 7:45 12/24    Notification Interaction: [employer] Mess[employer]    Purpose of Notification: \"Pt has numbness in the face and says he has symptoms like he is low, such as sweating and being hot, \"    Orders Received: Start NS at 100 mL/hr, initiate insulin drip, and labs for BG ketones, and a urine sample.    Comments:   "

## 2023-12-24 NOTE — PROVIDER NOTIFICATION
DATE/TIME OF CALL RECEIVED FROM LAB:  12/24/23 at 9:49 AM   LAB TEST:  Ketone  LAB VALUE:  5.9  PROVIDER NOTIFIED?: Yes  PROVIDER NAME: Fiorella Wheatley  DATE/TIME LAB VALUE REPORTED TO PROVIDER: 12/24/23 9:48 AM  MECHANISM OF PROVIDER NOTIFICATION:  [employer] Mess[employer]  PROVIDER RESPONSE:  Placed DKA orders

## 2023-12-24 NOTE — PHARMACY-VANCOMYCIN DOSING SERVICE
"Pharmacy Vancomycin Initial Note  Date of Service 2023  Patient's  1940  83 year old, male    Indication: Sepsis    Current estimated CrCl = Estimated Creatinine Clearance: 27.2 mL/min (A) (based on SCr of 1.74 mg/dL (H)).    Creatinine for last 3 days  2023:  9:43 AM Creatinine 1.46 mg/dL  2023:  6:45 AM Creatinine 1.64 mg/dL  2023:  6:50 AM Creatinine 1.67 mg/dL; 12:09 PM Creatinine 1.71 mg/dL;  2:20 PM Creatinine 1.74 mg/dL    Recent Vancomycin Level(s) for last 3 days  No results found for requested labs within last 3 days.      Vancomycin IV Administrations (past 72 hours)        No vancomycin orders with administrations in past 72 hours.                    Nephrotoxins and other renal medications (From now, onward)      Start     Dose/Rate Route Frequency Ordered Stop    23 1600  vancomycin (VANCOCIN) 750 mg in sodium chloride 0.9 % 250 mL intermittent infusion         750 mg  over 60 Minutes Intravenous EVERY 24 HOURS 23 1501      23 1530  vancomycin (VANCOCIN) 1,250 mg in 0.9% NaCl 250 mL intermittent infusion         1,250 mg  over 90 Minutes Intravenous ONCE 23 1501      23 1500  piperacillin-tazobactam (ZOSYN) 3.375 g vial to attach to  mL bag        Note to Pharmacy: For [facility], [facility] and [facility]: For Zosyn-naive patients, use the \"Zosyn initial dose + extended infusion\" order panel.    3.375 g  over 30 Minutes Intravenous EVERY 6 HOURS 23 1436              Contrast Orders - past 72 hours (72h ago, onward)      None            [employer] Prediction of Planned Initial Vancomycin Regimen  Loading dose: 1250 mg at 15:00 2023.  Regimen: 750 mg IV every 24 hours.  Start time: 16:00 on 2023  Exposure target: AUC24 (range)400-600 mg/L.hr   AUC24,ss: 531 mg/L.hr  Probability of AUC24 > 400: 80 %  Ctrough,ss: 17.8 mg/L  Probability of Ctrough,ss > 20: 39 %  Probability of nephrotoxicity (Lodise JORY 2009): 14 %         Plan:  Start " vancomycin 1250 mg loading dose, then 750 mg IV q24h.   Vancomycin monitoring method: AUC  Vancomycin therapeutic monitoring goal: 400-600 mg*h/L  Pharmacy will check vancomycin levels as appropriate in 1-3 Days.    Serum creatinine levels will be ordered daily for the first week of therapy and at least twice weekly for subsequent weeks.      Andie Bailey RPH , PharmD, BCPS

## 2023-12-24 NOTE — PLAN OF CARE
Goal Outcome Evaluation:  Orientations: A&Ox4, but lethargic. Wakes to voice.   Vitals/Pain: VSS ex; bradycardia and soft pressures. Denies pain.   Tele: Afib w/ CVR  Lines/Drains: PIVx2  Skin/Wounds: Blanchable redness on coccyx. Abrasion Left wrist. Scattered bruising and scabbing.   GI/: Clear liquid diet. Adequate UOP. BM this morning.   Labs: Abnormal/Trends, Electrolyte Replacement- Cr 1.74, lactic 5.6, ketones 1.2  Ambulation/Assist: Ax1 GB, W  Sleep Quality: Good  Plan: Insulin gtt. RRT called for elevated lactic at 1430. See note for further details. 1 L fluid bolus. Abx. UA sent.

## 2023-12-24 NOTE — PROVIDER NOTIFICATION
"DATE/TIME OF CALL RECEIVED FROM LAB:  12/24/23 at 7:25 AM   LAB TEST:  Blood glucose  LAB VALUE:  543  PROVIDER NOTIFIED?: Yes  PROVIDER NAME: Fiorella Wheatley  DATE/TIME LAB VALUE REPORTED TO PROVIDER: 12/24 7:25  MECHANISM OF PROVIDER NOTIFICATION: Page  PROVIDER RESPONSE:  \"Recheck when pt bolus is done\"  "

## 2023-12-24 NOTE — PROVIDER NOTIFICATION
MD Notification    Notified Person: MD    Notified Person Name:Dr Reardon    Notification Date/Time:8:34 AM      Notification Interaction:  In-person  Purpose of Notification:    Orders Received:  Starting insulin infusion.  UA/UC ordered, wbc 17.  Imc orders placed  Comments:

## 2023-12-25 NOTE — PLAN OF CARE
A/Ox4. VSS. On RA. Tele A.fib RVR/SVR. LS diminished. +bs, +flatus, +bm. Voiding adequatley. Neuros intact. Denied pain. Pt had a bradycardic episode into the 30's overnight. Writer went to check on him and he vomited. See provider notification. Zofran given. Insulin gtt stopped. Lantus given. Glucose have been low throughout night. Dextrose IV given x3, and PO dextrose x1. Pt also was drinking apple juice overnight. Up with assist x1gb and walker. Tolerating clears.

## 2023-12-25 NOTE — PROGRESS NOTES
[facility]    Medicine Progress Note - Hospitalist Service    Date of Admission:  12/11/2023    Assessment & Plan   Tc Garcia is a 83 year old male with diabetes mellitus type 2 (on insulin pump, h/o DKA), paroxysmal atrial fibrillation (on Eliquis), hypertension, hyperlipidemia, pulmonary hypertension, h/o spontaneous intracranial hemorrhage, recurrent pleural effusion, chronic kidney disease stage 3, BPH and anemia of chronic disease mated on 12/11/2023 with dizziness, fall and head injury.       Intraparenchymal hematoma    Mild hydrocephalus   Elevated troponin on admission likely in the setting of demand ischemia from fall.  H/o spontaneous intracranial hemorrhage (1/2023)  paroxysmal atrial fibrillation (on eliquis prior to admission)  Presented with dizziness diplopia and nausea.  *HCT with 1.5 X 3 cm intraparenchymal hematoma centered in inferior cerebellar vermis with likely extension into fourth ventricle; no hydrocephalus  *CTA head and neck noted some atherosclerotic plaque with questionable active contrast extravasation into the hematoma  *CT cervical spine noted with compression fracture deformities C7 with slight further loss of height since comparison study and noted with mild degenerative changes  *MRI brain with unchanged left cerebellar intraparenchymal hematoma with intraventricular extension, small volume subarachnoid hemorrhage involving the bilateral cerebral sulci, 0.1 x 0.3 cm area of diffusion restriction along medial left frontal lobe which may represent acute infarct, punctate foci of chronic microhemorrhage in bilateral cerebellar hemispheres  Echocardiogram with EF of 60 to 65%.  Severe biatrial enlargement.  Mild to moderate TR.  Telemetry atrial fibrillation.  LDL 54.  Troponin 34 >30  --- Etiology for intraparenchymal hematoma likely hypertensive and in the setting of chronic anticoagulation with Eliquis versus traumatic after sudden onset of dizziness  "and fall.  Anticoagulation was reversed with Kcentra in ED  Admitted to ICU initially for close monitoring  Stroke neurology Followed: \"HOLD anticoagulation. Not an ASPIRE candidate given ICH within the past year.   Systolic BP Goal: 130-150.    Neurosurgery followed, \"Patient is DNR/DNI, does not wish any intervention from neurosurgery, particular surgical intervention or an EVD. Neurosurgery will sign off.\"  Continue neuro checks q4H  PT/OT consulted, planning for discharge to ARU   Tele monitor  On subcutaneous Lovenox for DVT prophylaxis, stroke neurology okay.  Plan to have him follow up with cardiology in 1 month to discuss Watchman. Follow-up with stroke neurology in 6-8 weeks      Diabetic ketoacidosis  Hyperglycemia from uncontrolled diabetes mellitus.  Episodes of intermittent hypoglycemia  Diabetes mellitus type II HgbA1c 8.1% 12/12/23  *Patient's insulin pump had been on hold since admission in the setting of IPH.  *Blood sugars labile during hospital stay.  *Insulin pump reinstated 12/23/2023.  Patient now managing his own blood sugars.  * Patient went into DKA on 12/24/2023.  Likely triggered by infection with rise in the WBC count.    Plan  - DKA protocol initiated.    - Insulin pump now discontinued.  -DKA resolved on 12/25/2023.  Gap closed and beta hydroxybutyrate was now within normal limits.  -Patient transition to Lantus overnights due to hypoglycemia while on the drip.  He continues to be hypoglycemic while on Lantus  -Will decrease dose of Lantus for tonight from 14 units to 9 units.  -Cover with SSI and Carb dose insulin.       Mild-moderate oral-pharyngeal dysphagia   SLP following Diet per speech      UTI due to Klebsiella pneumoniae  Possible aspiration pneumonia.  On 12/12 to 12/13 had agitation, confusion.  Had leukocytosis.  Blood cultures  no growth  UA with > 182 WBC/hpf, large leukocyte esterase.   Urine culture has greater than 100,000 CFU/mL Klebsiella pneumoniae  Chest x-ray right " "greater than left lower lung opacity suspicious for aspiration/infection.  - Was on IV ceftriaxone, subsequently switched to cefuroxime to complete course  - Follow-up imaging of chest in 6 weeks for resolution.  Aggressive incentive spirometry.  Strict aspiration precautions.  - wbc trending down, afebrile    Insomnia:  Pt takes 10mg melatonin at bedtime   Order placed        Chronic diastolic CHF  Atrial fibrillation PTA on Eliquis.  Hypertension.  Hyperlipidemia.  Prior to admission Eliquis on hold since admission.  Echocardiogram with EF of 60 to 65%.  Severe biatrial enlargement.  Mild to moderate TR.  Telemetry atrial fibrillation.  LDL 54.  Was on Nicardipine drip on admission.   Per Stroke Neurology, SBP goal is 130-150 mmHg.   Continue amlodipine (added this stay)  Continue PTA carvedilol, avoid rebound tachycardia  Continue Avapro   hold PTA Demadex, cardiology recently advised changing to as needed dosing  As needed labetalol and hydralazine available  Follow-up with cardiology after discharge, discuss watchman.     Anemia of chronic disease  Monitor hemoglobin levels closely     Physical deconditioning from medical illness, senile frailty.  PT, OT following.  ARU for rehabilitation.     Chronic C7 compression fracture.  Cervical flexion-extension x-ray without any dynamic instability.  Per chart review neurosurgery 12/12 recommending likely healed since his initial imaging 4 years ago.  Given no dynamic instability on x-ray recommend no cervical collar needed.     CKD stage III  *Baseline creatinine around 1 to 1.1 in early 2023, more recently around 1.5  *Creatinine on admission 1.66  Continue Irbesartan  See comments in note from office visit with Lacie Tapia on 11/20/2023, \"Advised not to start Entresto given renal function, then due to further decline in renal function torsemide changed to PRN.\"  Creatinine stable              Diet: Room Service  Diet  Moderate Consistent Carb (60 g CHO per Meal) " Diet    DVT Prophylaxis: Enoxaparin (Lovenox) SQ  Pruett Catheter: Not present  Lines: None     Cardiac Monitoring: ACTIVE order. Indication: Electrolyte Imbalance (24 hours)- Magnesium <1.3 mg/ml; Potassium < =2.8 or > 5.5 mg/ml  Code Status: No CPR- Do NOT Intubate      Clinically Significant Risk Factors             # Anion Gap Metabolic Acidosis: Highest Anion Gap = 19 mmol/L in last 2 days, will monitor and treat as appropriate      # Hypertension: Noted on problem list  # Chronic heart failure with preserved ejection fraction: heart failure noted on problem list and last echo with EF >50%      # DMII: A1C = 8.1 % (Ref range: <5.7 %) within past 6 months       # Financial/Environmental Concerns: none         Disposition Plan     Expected Discharge Date: 12/26/2023    Discharge Delays: Placement - LTAC/ARU  Destination: home with help/services;inpatient rehabilitation facility              Tata Reardon MD  Hospitalist Service  [facility]  Securely message with [employer] (more info)  Text page via [employer] Paging/Directory   ______________________________________________________________________    Interval History   Patient lying in bed, notes that he feels sick today.  Denies any chest pain or abdominal pain.  Admits to have had nausea and vomiting earlier but none at this time.  I did assure patient that he appears to have gone into DKA and we will discontinue the his insulin pump and start on DKA protocol with insulin drip.    Physical Exam   Vital Signs: Temp: 98  F (36.7  C) Temp src: Axillary BP: 130/68 Pulse: 71   Resp: 30 SpO2: 100 % O2 Device: None (Room air)    Weight: 134 lbs .63 oz    General Appearance: Well appearing for stated age.  Respiratory: CTAB, no rales or ronchi  Cardiovascular: S1, S2 normal, no murmurs  GI: non-tender on palpation, BS present      Medical Decision Making       55 MINUTES SPENT BY ME on the date of service doing chart review, history, exam, documentation  & further activities per the note.      Data     I have personally reviewed the following data over the past 24 hrs:    16.5 (H)  \   10.1 (L)   / 345     137 102 27.3 (H) /  84   4.0 28 1.60 (H) \     ALT: 7 AST: 10 AP: 73 TBILI: 0.5   ALB: 3.7 TOT PROTEIN: 5.5 (L) LIPASE: N/A     Procal: N/A CRP: N/A Lactic Acid: 2.2 (H)         Imaging results reviewed over the past 24 hrs:   Recent Results (from the past 24 hour(s))   XR Chest Port 1 View    Narrative    EXAM: XR CHEST PORT 1 VIEW  LOCATION: [facility]  DATE: 12/24/2023    INDICATION: Leukocytosis  COMPARISON: Chest x-ray 12/12/2023      Impression    IMPRESSION: Stable enlargement of the cardiac silhouette. Slight decrease in bilateral perihilar opacities, favor improved edema. No definite new airspace consolidation. Persistent left pleural effusion with associated compressive atelectasis.   Questionable age-indeterminate minimally displaced fractures of the left fifth and sixth ribs, correlate with point tenderness. Additional healed right rib fractures.

## 2023-12-25 NOTE — PROVIDER NOTIFICATION
MD Notification    Notified Person: MD    Notified Person Name: Dr. Horan    Notification Date/Time: 12/24/23 at 11:11pm    Notification Interaction: Kenan    Purpose of Notification: FYI lactic is down to 2.3 from 5.7. Also pt has had 1 glucose reading under 150, the last 4 have been under 100. Is it okay to discontinue gtt?    Orders Received: Okay to stop gtt, and fluids. Give 14 units of lantus.    Clarified with provider that he wanted the pt to get 14units of lantus since glucose was 99 at 11pm. Per provider yes give lantus.

## 2023-12-25 NOTE — PLAN OF CARE
Goal Outcome Evaluation:      Plan of Care Reviewed With: patient    Overall Patient Progress: improvingOverall Patient Progress: improving         Reason for Admission: IPH DKA    Cognitive/Mentation: A/Ox 4  Neuros/CMS: Intact ex slow and deliberate (prior shift noted slight L facial droop--non noted this shift).  VS: Occasional soft BPs-improving. On RA.   Tele: a.fib/flutter.  : Continent.  Pulmonary: LS diminished.  Pain: denies.     Drains/Lines: 2 PIV  Skin: abrasion L wrist, blanchable redness on coccyx-mepliex in place  Activity: Assist x 1 with GB walker.  Diet: clear with thin liquids. Takes pills whole.     Therapies recs: ARU  Discharge: pending    Aggression Stoplight Tool: green    End of shift summary: IV fluid bolus finished. Started antibiotics.  K recheck in AM. Repeat lactic acid this evening.

## 2023-12-25 NOTE — PROVIDER NOTIFICATION
MD Notification    Notified Person: MD    Notified Person Name: Dr. Whitten    Notification Date/Time: 12/25/23 at 2:42am    Notification Interaction: Vocera    Purpose of Notification:   I got a notification on my vocera of low heart rate. Pt's heart rate was in the mid 30's. His rate is now 60-70's. I checked on him he said he was feeling fine and then started to vomit. Pretty sure he aspirated. He is now coughing. The stuff he is coughing up looks like what he vomited. His O2 sats are still in the high 90's. Do you want to do a chest x-ray?    Orders Received: No new orders    Comments: Per MD, Pt is already on zosyn. Monitor respiratory status, and O2 sats. If it gets worse can reconsider chest x ray. It won't change course of management.

## 2023-12-26 NOTE — PLAN OF CARE
A+Ox4 VSS on room air. Lung sounds diminished. Tele a-fib CVR. Neuros intact. Bowels active, flatus+, BM+. Voiding adequately. Overnight BG 75, improved with apple juice and marlyn crackers. Blanchable redness to coccyx, abrasion to left wrist, and scattered bruising. Denies pain. Up w/ 1 GB/W

## 2023-12-26 NOTE — PROGRESS NOTES
[facility]    Medicine Progress Note - Hospitalist Service    Date of Admission:  12/11/2023    Assessment & Plan   Tc Garcia is a 83 year old male with diabetes mellitus type 2 (on insulin pump, h/o DKA), paroxysmal atrial fibrillation (on Eliquis), hypertension, hyperlipidemia, pulmonary hypertension, h/o spontaneous intracranial hemorrhage, recurrent pleural effusion, chronic kidney disease stage 3, BPH and anemia of chronic disease mated on 12/11/2023 with dizziness, fall and head injury.       Intraparenchymal hematoma    Mild hydrocephalus   Elevated troponin on admission likely in the setting of demand ischemia from fall.  H/o spontaneous intracranial hemorrhage (1/2023)  paroxysmal atrial fibrillation (on eliquis prior to admission)  Presented with dizziness diplopia and nausea.  *HCT with 1.5 X 3 cm intraparenchymal hematoma centered in inferior cerebellar vermis with likely extension into fourth ventricle; no hydrocephalus  *CTA head and neck noted some atherosclerotic plaque with questionable active contrast extravasation into the hematoma  *CT cervical spine noted with compression fracture deformities C7 with slight further loss of height since comparison study and noted with mild degenerative changes  *MRI brain with unchanged left cerebellar intraparenchymal hematoma with intraventricular extension, small volume subarachnoid hemorrhage involving the bilateral cerebral sulci, 0.1 x 0.3 cm area of diffusion restriction along medial left frontal lobe which may represent acute infarct, punctate foci of chronic microhemorrhage in bilateral cerebellar hemispheres  Echocardiogram with EF of 60 to 65%.  Severe biatrial enlargement.  Mild to moderate TR.  Telemetry atrial fibrillation.  LDL 54.  Troponin 34 >30  --- Etiology for intraparenchymal hematoma likely hypertensive and in the setting of chronic anticoagulation with Eliquis versus traumatic after sudden onset of dizziness  "and fall.  Anticoagulation was reversed with Kcentra in ED  Admitted to ICU initially for close monitoring  Stroke neurology Followed: \"HOLD anticoagulation. Not an ASPIRE candidate given ICH within the past year.   Systolic BP Goal: 130-150.    Neurosurgery followed, \"Patient is DNR/DNI, does not wish any intervention from neurosurgery, particular surgical intervention or an EVD. Neurosurgery will sign off.\"  Continue neuro checks q4H  PT/OT consulted, planning for discharge to ARU   Tele monitor  On subcutaneous Lovenox for DVT prophylaxis, stroke neurology okay.  Plan to have him follow up with cardiology in 1 month to discuss Watchman. Follow-up with stroke neurology in 6-8 weeks      Diabetic ketoacidosis  Hyperglycemia from uncontrolled diabetes mellitus.  Episodes of intermittent hypoglycemia  Diabetes mellitus type II HgbA1c 8.1% 12/12/23  *Patient's insulin pump had been on hold since admission in the setting of IPH.  *Blood sugars labile during hospital stay.  *Insulin pump reinstated 12/23/2023.  Patient now managing his own blood sugars.  * Patient went into DKA on 12/24/2023.  Likely triggered by infection with rise in the WBC count.    Plan  - DKA protocol initiated.    - Insulin pump now discontinued.  -DKA resolved on 12/25/2023.  Gap closed and beta hydroxybutyrate is now within normal limits.  -Transition to Lantus at decreased dose of 9 units.  Will leave him on this dose and switch to patient's insulin pump once more stable from a septic standpoint.  -Cover with SSI and Carb dose insulin.       Mild-moderate oral-pharyngeal dysphagia   SLP following Diet per speech      UTI due to Klebsiella pneumoniae  Possible aspiration pneumonia.  On 12/12 to 12/13 had agitation, confusion.  Had leukocytosis.  Blood cultures  no growth  UA with > 182 WBC/hpf, large leukocyte esterase.   Urine culture has greater than 100,000 CFU/mL Klebsiella pneumoniae  Chest x-ray right greater than left lower lung opacity " "suspicious for aspiration/infection.  - Was on IV ceftriaxone, subsequently switched to cefuroxime to complete course  - Follow-up imaging of chest in 6 weeks for resolution.  Aggressive incentive spirometry.  Strict aspiration precautions.  - wbc trending down, afebrile    Insomnia:  Pt takes 10mg melatonin at bedtime   Order placed        Chronic diastolic CHF  Atrial fibrillation PTA on Eliquis.  Hypertension.  Hyperlipidemia.  Prior to admission Eliquis on hold since admission.  Echocardiogram with EF of 60 to 65%.  Severe biatrial enlargement.  Mild to moderate TR.  Telemetry atrial fibrillation.  LDL 54.  Was on Nicardipine drip on admission.   Per Stroke Neurology, SBP goal is 130-150 mmHg.   Continue amlodipine (added this stay)  Continue PTA carvedilol, avoid rebound tachycardia  Continue Avapro   hold PTA Demadex, cardiology recently advised changing to as needed dosing  As needed labetalol and hydralazine available  Follow-up with cardiology after discharge, discuss watchman.     Anemia of chronic disease  Monitor hemoglobin levels closely     Physical deconditioning from medical illness, senile frailty.  PT, OT following.  ARU for rehabilitation.     Chronic C7 compression fracture.  Cervical flexion-extension x-ray without any dynamic instability.  Per chart review neurosurgery 12/12 recommending likely healed since his initial imaging 4 years ago.  Given no dynamic instability on x-ray recommend no cervical collar needed.     CKD stage III  *Baseline creatinine around 1 to 1.1 in early 2023, more recently around 1.5  *Creatinine on admission 1.66  Continue Irbesartan  See comments in note from office visit with Lacie Tapia on 11/20/2023, \"Advised not to start Entresto given renal function, then due to further decline in renal function torsemide changed to PRN.\"  Creatinine stable              Diet: Room Service  Diet  Moderate Consistent Carb (60 g CHO per Meal) Diet    DVT Prophylaxis: Enoxaparin " (Lovenox) SQ  Pruett Catheter: Not present  Lines: None     Cardiac Monitoring: ACTIVE order. Indication: Electrolyte Imbalance (24 hours)- Magnesium <1.3 mg/ml; Potassium < =2.8 or > 5.5 mg/ml  Code Status: No CPR- Do NOT Intubate      Clinically Significant Risk Factors                  # Hypertension: Noted on problem list  # Chronic heart failure with preserved ejection fraction: heart failure noted on problem list and last echo with EF >50%      # DMII: A1C = 8.1 % (Ref range: <5.7 %) within past 6 months       # Financial/Environmental Concerns: none         Disposition Plan      Expected Discharge Date: 12/27/2023,  3:00 PM    Destination: home with help/services;inpatient rehabilitation facility  Discharge Comments: awaiting insurance auth and stable blood sugars            Tata Reardon MD  Hospitalist Service  [facility]  Securely message with [employer] (more info)  Text page via [employer] Paging/Directory   ______________________________________________________________________    Interval History   Patient doing better today.  Sitting in bed with no complaints.  Asking for when he may discharge a move onto acute rehab..  Likely needs another couple of days to stabilize patient    Physical Exam   Vital Signs: Temp: 97.4  F (36.3  C) Temp src: Axillary BP: (!) 119/92 Pulse: 56   Resp: (!) 33 SpO2: 99 % O2 Device: None (Room air)    Weight: 134 lbs .63 oz    General Appearance: Well appearing for stated age.  Respiratory: CTAB, no rales or ronchi  Cardiovascular: S1, S2 normal, no murmurs  GI: non-tender on palpation, BS present      Medical Decision Making       55 MINUTES SPENT BY ME on the date of service doing chart review, history, exam, documentation & further activities per the note.      Data     I have personally reviewed the following data over the past 24 hrs:    N/A  \   N/A   / N/A     N/A N/A N/A /  139 (H)   N/A N/A 1.57 (H) \       Imaging results reviewed over the past 24  hrs:   No results found for this or any previous visit (from the past 24 hour(s)).

## 2023-12-26 NOTE — PLAN OF CARE
Assumed cares from [id number].    Pt A/O, makes needs known via call light. Pt is very pleasant. Neuros intact with the exception of a slight L facial droop. Pt afib with CVR, normotensive. On room air, LS dim.

## 2023-12-26 NOTE — PROGRESS NOTES
Care Management Follow Up    Length of Stay (days): 15    Expected Discharge Date: 12/26/2023     Concerns to be Addressed: discharge planning     Patient plan of care discussed at interdisciplinary rounds: Yes    Anticipated Discharge Disposition: Home, Home Care, Transitional Care     Anticipated Discharge Services: Transportation Services  Anticipated Discharge DME: Other (see comment) (to be determined)    Patient/family educated on Medicare website which has current facility and service quality ratings:    Education Provided on the Discharge Plan: Yes  Patient/Family in Agreement with the Plan: unable to assess    Referrals Placed by CM/SW:    Private pay costs discussed: Not applicable    Additional Information:  Writer spoke with ARU liaison this morning. ARU is submitting for authorization again as patient did not discharge over the weekend. Patient would likely be able to admit for tomorrow if insurance authorization goes through and his blood sugars remain stable. Writer set ride time up for 12:50 to 1:35 for tomorrow for patient to admit to ARU. Liaison for ARU updated. Writer updated MD. MD said patient would likely not discharge tomorrow but possibly could discharge in two days. Writer left the ride set incase patient will discharge.    [facility]  Care Management

## 2023-12-26 NOTE — PLAN OF CARE
Goal Outcome Evaluation:  Orientations: A&Ox4.   Vitals/Pain: VSS on RA. Denies pain.   Tele: Afib w/ CVR  Lines/Drains: PIVx1  Skin/Wounds: Blanchable redness on coccyx. Abrasion Left wrist. Scattered bruising and scabbing.   GI/: Mod carb diet. Adequate UOP. BM today.  Labs: Abnormal/Trends, Electrolyte Replacement-   Ambulation/Assist: Ax1 GB, W. 2 walks in cisneros and up in chair.   Sleep Quality: Good  Plan: Insulin gtt transitioned to sub-Q this morning. Hypoglycemia this afternoon, protocol followed. Blood sugars closely monitored. Continue Abx. Encouraged oral intake and activity. Family visited and updated.

## 2023-12-27 PROBLEM — I62.9 INTRACRANIAL HEMORRHAGE (H): Status: ACTIVE | Noted: 2023-01-01

## 2023-12-27 PROBLEM — N18.9 ANEMIA DUE TO CHRONIC KIDNEY DISEASE: Status: ACTIVE | Noted: 2020-05-14

## 2023-12-27 PROBLEM — D63.1 ANEMIA DUE TO CHRONIC KIDNEY DISEASE: Status: ACTIVE | Noted: 2020-05-14

## 2023-12-27 PROBLEM — D50.9 IRON DEFICIENCY ANEMIA: Status: ACTIVE | Noted: 2023-01-01

## 2023-12-27 NOTE — H&P
[facility]   Acute Rehabilitation Unit  Admission History and Physical    CHIEF COMPLAINT   Left leg weakness     HISTORY OF PRESENT ILLNESS  Tc Garcia is a 83 year old right hand dominant male with a relevant PMH of DM2 on insulin pump (history of DKA), paroxysmal atrial fibrillation on eliquis, HTN, HLD, CKD stage 3, recurrent pleural effusion, and history of spontaneous intracranial hemorrhage who sustained a left intraparenchymal hematoma after a fall on 12/11/23.  He was sitting at the table and became dizzy, sustaining a fall with a head injury. He thinks the injury was mild and denies loss of consciousness.  Head CT showed a 1.5x3cm left inferior cerebellar hematoma.  CT also noted a 0.1x0.3cm area of left medial frontal lobe diffusion restriction.  Afterward, he developed left sided weakness, lower extremity more than upper extremity, as well as increased blurriness of vision.  He denies any sensory changes, cognitive changes, difficulties with speech or swallow, or changes in hearing.  He also denies bowel or bladder changes, being volitional in both.  Hospital course was significant for dysphagia, hyper and hypotension, labile glusose and DKA, nausea, ABBIE, and UTI.    During his acute hospitalization, the patient was seen and evaluated by PT, OT, SLP and PM&R consult service.  All specialties collectively recommended that patient would benefit from ongoing therapies in the acute inpatient rehabilitation setting.     Functionally, the patient is mod assist for transfers, ambulation, lower body dressing, and toilet transfers.  Supervision with bed mobility, sitting, feeding, and grooming.    Currently, the patient is medically appropriate and is assessed to have needs and will benefit from an inpatient acute rehabilitation comprehensive program working with PT and OT and will also benefit from supervision and management of Rehab Nursing and Rehab MD.       PAST  MEDICAL HISTORY  Past Medical History:   Diagnosis Date    Anemia     Anemia of chronic disease 5/14/2020    Back pain     CKD (chronic kidney disease) stage 3, GFR 30-59 ml/min (H)     CRF (chronic renal failure), stage 3  5/14/2020    Fall 11/2019    suffered multiple left rib fractures, C3 and T2 fractures    Mixed hyperlipidemia 7/7/2004    Paroxysmal atrial fibrillation (H)     Personal history of colonic polyps 1972    gets colonoscopy every five years, due in 2006    Pleural effusion on left 11/2019    after multiple rib fractures    Pulmonary hypertension (H) 5/10/2020    Added automatically from request for surgery [id number]    Recurrent Left Pleural effusion -- S/P Pleurex Cath 5/12/20 12/30/2019    Rosacea 1989    Type II or unspecified type diabetes mellitus without mention of complication, not stated as uncontrolled 1999    Unspecified essential hypertension 1994       SURGICAL HISTORY  Past Surgical History:   Procedure Laterality Date    ANESTHESIA CARDIOVERSION N/A 2/3/2020    Procedure: ANESTHESIA, FOR CARDIOVERSION;  Surgeon: GENERIC ANESTHESIA PROVIDER;  Location:  OR     RIGHT HEART CATH MEASUREMENTS RECORDED N/A 5/13/2020    Procedure: Right Heart Cath;  Surgeon: Senthil Silva MD;  Location:  HEART CARDIAC CATH LAB    HC REMOVE TONSILS/ADENOIDS,<11 Y/O      T & A <11y.o.    IR CHEST TUBE DRAIN TUNNELED LEFT  5/12/2020    IR CHEST TUBE PLACEMENT NON-TUNNELLED LEFT  7/11/2020    IR CHEST TUBE REMOVAL NON TUNNELED LEFT  7/17/2020    IR CHEST TUBE REMOVAL TUNNELED LEFT  7/11/2020    LAPAROSCOPIC CHOLECYSTECTOMY N/A 11/22/2020    Procedure: CHOLECYSTECTOMY, LAPAROSCOPIC;  Surgeon: Annette Lambert MD;  Location:  OR    LAPAROSCOPIC HERNIORRHAPHY INGUINAL BILATERAL Bilateral 10/27/2020    Procedure: LAPAROSCOPIC BILATERAL INGUINAL HERNIA REPAIR WITH MESH;  Surgeon: Brian Garsia MD;  Location:  OR       SOCIAL HISTORY  Marital Status: , lives with spouse  Living  situation: House, 3 steps to enter with railing, greater than 10 stairs to second floor, but with a chair lift in the first 2 steps.  He believes he will be able to transfers to the chair lift provided he can ascend two steps.  Family support: family can provide 1 person assistance  Tobacco use: The patient reports that he quit smoking about 15 years ago. His smoking use included cigarettes. He started smoking about 56 years ago. He has a 8 pack-year smoking history. He has never used smokeless tobacco.  Alcohol use: The patient reports that he does not currently use alcohol after a past usage of about 35.0 standard drinks of alcohol per week.    FAMILY HISTORY  Family History   Problem Relation Age of Onset    Family History Negative Mother          age 71    Family History Negative Father          age 70    Diabetes Brother         alive age 77    Diabetes Brother         alive age 76    Family History Negative Brother             Family History Negative Brother             Diabetes Sister         alive age74    Family History Negative Sister          age 86    Heart Disease Sister             Family History Negative Sister             Family History Negative Sister             Diabetes Sister     Diabetes Sister     Diabetes Brother     Diabetes Brother        PRIOR FUNCTIONAL HISTORY   Pt was independent with all ADLs/IADLs, transfers, mobility and gait.  He did not use a cane or walker at baseline.    MEDICATIONS  Medications Prior to Admission   Medication Sig Dispense Refill Last Dose    glucagon 1 MG kit 1 mg as needed for low blood sugar       INSULIN PUMP - OUTPATIENT Med[employer]onic device with no CGM and site change every 3 days   Sensitivity factor (midnight to midnight): 55  Bolus (units:gram): 0516-8023 = 1:9, 0251-3983 = 1:7, 2619-3632 = 1:7, 1215-2982 = 1:9, 9449-6539 = 1:13  Basal (units/hour): 9569-3999 = 0.45, 8318-7876 = 0.5, 8395-6115 =  0.625, 3760-3962 = 0.6, 8362-3369 = 0.45       irbesartan (AVAPRO) 300 MG tablet Take 1 tablet (300 mg) by mouth At Bedtime 90 tablet 3     potassium chloride ER (K-TAB/KLOR-CON) 10 MEQ CR tablet Take 1 tablet (10 mEq) by mouth daily 90 tablet 3     torsemide (DEMADEX) 10 MG tablet Take 1 tablet (10 mg) by mouth as needed (For weight gain greater than 3 lbs overnight or increased swelling and/or shortness of breath) 90 tablet 3     acetaminophen (TYLENOL) 325 MG tablet Take 2 tablets (650 mg) by mouth every 6 hours as needed for mild pain or fever  0     Continuous Blood Gluc  (DEXCOM G6 ) JOSE Dexcom G6    USE EACH WITH 10 DAYS SENSORS       HUMALOG 100 UNIT/ML injection For refilling insulin pump       tamsulosin (FLOMAX) 0.4 MG capsule Take 0.4 mg by mouth every morning (Patient not taking: Reported on 12/11/2023)       [DISCONTINUED] apixaban ANTICOAGULANT (ELIQUIS) 2.5 MG tablet Take 1 tablet (2.5 mg) by mouth 2 times daily (Patient taking differently: Take 2.5 mg by mouth daily) 180 tablet 3     [DISCONTINUED] carvedilol (COREG) 12.5 MG tablet Take 1 tablet (12.5 mg) by mouth 2 times daily (with meals) 180 tablet 3     [DISCONTINUED] doxycycline hyclate (VIBRAMYCIN) 50 MG capsule TAKE 1 CAPSULE BY MOUTH TWICE DAILY WITH FOOD (Patient not taking: Reported on 11/16/2023)       [DISCONTINUED] finasteride (PROSCAR) 5 MG tablet Take 1 tablet (5 mg) by mouth daily (Patient not taking: Reported on 12/11/2023)  0     [DISCONTINUED] HYDROcodone-acetaminophen (NORCO) 5-325 MG tablet TAKE 1/2-1 TABLET BY MOUTH AS NEEDED FOR PAIN. MAX OF 1 TABLET PER DAY       [DISCONTINUED] sacubitril-valsartan (ENTRESTO) 49-51 MG per tablet Take 1 tablet by mouth 2 times daily (Patient not taking: Reported on 11/20/2023) 180 tablet 3     [DISCONTINUED] traMADol (ULTRAM-ER) 100 MG 24 hr tablet Take 1 tablet by mouth daily          ALLERGIES  Allergies   Allergen Reactions    No Known Drug Allergy        REVIEW OF  "SYSTEMS  A 10 point ROS was performed and negative unless otherwise noted in HPI.     PHYSICAL EXAM  VITAL SIGNS:  There were no vitals taken for this visit.  BMI:  Estimated body mass index is 19.79 kg/m  as calculated from the following:    Height as of 12/11/23: 1.753 m (5' 9\").    Weight as of 12/24/23: 60.8 kg (134 lb 0.6 oz).     General: NAD, pleasant and cooperative   HEENT: ATNC, oropharynx clear with MMM, EOMI, pupils slightly miotic and   Pulmonary: clear breath sounds b/l, no rales/wheezing. Intermittent dry cough.  Cardiovascular: RRR, possible systolic murmur  Abdominal: soft, non-tender, non-distended, active bowel sounds  Extremities: warm, well perfused, no edema in bilateral lower extremities, no tenderness in calves  MSK/neuro:  Mental Status:  alert and oriented x3  Cranial Nerves:   2nd CN: Pupils equal, round, reactive to light and accomodation. and visual fields intact to confrontation.  Saccades noted with both left and right gaze.  3rd,4th,6th CN:  EOMI, appropriate pupillary responses  5th CN: facial sensation symmetric to light touch   7th CN: face symmetrical   8th CN: functional hearing bilaterally  9th, 10th CN: palate elevates symmetrically   11th CN: sternocleidomastoids and trapezii strong   12th CN: tongue midline and without fasciculations    Sensory: Symmetric to light touch in bilateral upper and lower extremities  Strength:   SF EF EE WE G I HF KE DF EHL PF  R 5/5 5/5 5/5 5/5 5/5 5/5 5/5 5/5 5/5 5/5 5/5  L 5/5 5/5 5/5 5/5 5/5 5/5 5/5 5/5 5/5 5/5 5/5  Abnormal movements: None   Coordination: No dysmetria on finger to nose b/l    LABS AND IMAGING  Recent Labs   Lab Test 12/27/23  1149 12/27/23  0747 12/27/23  0643 12/24/23  1233 12/24/23  1209 12/13/23  0321 12/12/23  2223 07/12/21  0000 06/21/21  0000 11/08/19  0658 11/07/19  0714   NA  --   --  139   < > 137   < > 137   < > 139   < > 147*   POTASSIUM  --   --  4.2  4.2   < > 4.4   < > 4.6   < > 4.8   < > 4.2   CHLORIDE  --   -- "  103   < > 98   < > 100   < > 103   < > 114*   CO2  --   --  30*   < > 20*   < > 25   < > 28   < > 30   BUN  --   --  14.8   < > 37.0*   < > 22.4   < > 29*   < > 28   CR  --   --  1.62*  1.62*   < > 1.71*   < > 1.63*   < > 1.58*   < > 1.71*   *   < > 156*   < > 465*   < > 375*   < > 246*   < > 78   GFRESTIMATED  --   --  42*  42*   < > 39*   < > 41*   < > 40*   < > 37*   GFRESTBLACK  --   --   --   --   --   --   --   --  46*   < > 43*   AST  --   --   --   --  10   < >  --    < >  --    < > 198*   ALT  --   --   --   --  7   < >  --    < >  --    < > 568*   GGT  --   --   --   --   --   --   --   --   --   --  34   ALKPHOS  --   --   --   --  73   < >  --    < >  --    < > 94   BILITOTAL  --   --   --   --  0.5   < >  --    < >  --    < > 1.8*   CT  --   --   --   --   --   --  15*  --   --   --   --     < > = values in this interval not displayed.     Recent Labs   Lab Test 12/27/23  0643 12/26/23  1636   WBC  --  11.8*   RBC  --  3.72*   HGB  --  9.9*   MCV  --  86   MCH  --  26.6   MCHC  --  30.9*   RDW  --  17.6*    280     Recent Labs   Lab Test 12/11/23  1419   INR 1.35*     MR Brain w/o & w Contrast    Result Date: 12/14/2023  IMPRESSION: 1.  Accounting for differences in modality, there is overall unchanged appearance of the left cerebellar intraparenchymal hematoma with intraventricular extension when compared to the earlier same day head CT. Small volume subarachnoid hemorrhage involving the bilateral cerebral sulci near the vertex is also similar. No evidence of new hemorrhage. 2.  There is a 0.4 x 0.3 cm area of diffusion restriction along the medial left frontal lobe which could be intraparenchymal and represent a small focus of acute infarct. 3.  There are punctate foci of chronic microhemorrhage within the bilateral cerebellar hemispheres and cerebral white matter, slightly increased in number compared to 04/25/2023. Given the overall distribution, this could related to sequela of  chronic hypertension.     ASSESSMENT/PLAN:  Tc Garcia is a 83 year old right hand dominant male with a PMH of DM2 with DKA, paroxysmal atrial fibrillation formerly on apixaban, hypertension, hyperlipidemia, pelural effusion, CKD stage 3, BPH, and ACD who sustained a left cerebellar intracranial hemorrhage from a fall on 12/11/23 with a possible left medial frontal lobe infarct on imaging.  His deficits include decreased visual acuity and mild left hemiparesis. He was admitted to acute inpatient rehabilitation on 12/27/23.    Impairment group code: 02.22 Traumatic, Closed Injury; Fall with resulting intracranial hemorrhage    Rehabilitation Plan    PT, OT on a daily basis, in addition to rehab nursing and close management of physiatrist.    Impairment of ADL's: OT for 90 minutes daily to work on ADL re-training such as grooming, self cares and bathing.    Impairment of mobility:  PT for 90 minutes daily to work on neuromuscular re-education focusing on strength, balance, coordination, and endurance.    Rehab RN for med administration, bowel regimen, glucose monitoring and wound care.     Medical Conditions    Rehab  #Neurological LLE weakness  #Traumatic brain injury  - PT, OT    #Bowel  - PRN bowel meds ordered    #Bladder  - bladder screening    #Skin  - Assess for presence of any pressure injury    #Sleep  - melatonin 10mg bedtime    Neuro  #Intraparenchymal hematoma    #Mild hydrocephalus   #H/o spontaneous intracranial hemorrhage (1/2023)  paroxysmal atrial fibrillation (on eliquis prior to admission)  Presented with dizziness diplopia and nausea. Workup found 1.5 X 3 cm intraparenchymal hematoma centered in inferior cerebellar vermis with likely extension into fourth ventricle; no hydrocephalus. Etiology ruled likely hypertensive in setting of chronic anticoagulation after fall.    - follow up with stroke neurology 6-8 weeks from 12/27    #Chronic C7 compression fracture  No dynamic  instability.    CV  #Chronic diastolic CHF  #Atrial fibrillation PTA on Eliquis  #Hypertension  #Hyperlipidemia  Previously on apixaban, held after ICH. Anticoagulation was reversed and held. Echocardiogram with EF of 60 to 65%.  Severe biatrial enlargement.  Mild to moderate TR.  - lovenox ppx  - stroke neurology in 6-8 weeks  - BP goal systolic 130-150  - continue amlodipine  - continue carvedilol  - continue avapro  - hold PTA demadex  - follow up with cardiology in 1 month to discuss Watchman    Endo  #Diabetic ketoacidosis  #Diabetes mellitus type II HgbA1c 8.1% 12/12/23  Patient's insulin pump had been on hold since admission in the setting of IPH. Blood sugars labile during hospital stay. Patient went into DKA on 12/24/2023.  Likely triggered by infection with rise in the WBC count hence pump discontinued and patient switched to insulin drip per DKA protocol. DKA resolved on 12/25/2023. DKA protocol discontinued.  - lantus glargine 9 units subq bedtime  - lispro 1 unit per 7 g carb breakfast  - lispro 1 unit per 7 g carb lunch  - lispro 1 unit per 10 g carb dinner  - med sliding scale insulin  - endo consult: transition back to insulin pump    Pulm  #Possible aspiration pneumonia.  Agitation, leukocytosis on 12/12 to 12/13. No growth blood cultures.  CXR R > L lower lung opacity.  Completed IV ceftriaxone - cefuroxime course.  - CXR in ~ 4 weeks (~1/27/24)  - Incentive spirometry  - aspiration precautions    ID  #Presumed sepsis likely due to UTI  Patient went into DKA with rising white count and mildly positive UA.  Of note, patient had just completed treatment for blood pneumonia UTI, question if this was not completely treated.  Urine culture with no growth. Started on vanc/zosyn and narrowed to ceftriaxone 12/27/23  - Complete 7 day total course of abx with ceftriaxone    Renal  #CKD Stage 3  Baseline creatinine 1-1.1, now 1.5.  - continue irbesartan  - consider readding torsemide prn for edema    #Anemia  of chronic disease  - M, Th labs    FEN/GI  #Diet  - Minced moist and thin liquids (dentures)  - consistent carb 60g    Ppx  #GI:  #DVT:    Disposition  Code status: DNR/DNI  ELOS: 12 days  Rehab Prognosis: good    #Follow Up  - Primary care provider, Jessika Cordoba in 1-2 weeks from discharge  - Stroke neurology in 6-8 weeks from 12/27/23  - cardiology in 1 month from 12/27/23  - CXR in ~ 1/27/24     Senthil Red MD    I spent a total of 60 minutes face-to-face with Tc Garcia during today's visit doing history, exam, counseling, and coordination of care. Over 50% of this time was spent counseling the patient and/or coordinating care.

## 2023-12-27 NOTE — PLAN OF CARE
Problem: Adult Inpatient Plan of Care    Outcome: Progressing  Flowsheets (Taken 12/27/2023 0546)  Outcome Evaluation: Pt sleeping between cares. Alert and oriented x4. Remained in afib cvr, bp stable throughout night. On room air, LS diminished. Voiding without difficulty. BG @ 0200 was 195. Ax1 with walker and gait belt. 1 small BM.    Plan of Care Reviewed With: patient  Goal: Readiness for Transition of Care  Outcome: Progressing

## 2023-12-27 NOTE — PHARMACY-ADMISSION MEDICATION HISTORY
Admission medication history completed at [facility]. Please see Pharmacist Admission Medication History note from 12/11/2023.

## 2023-12-27 NOTE — PLAN OF CARE
Goal Outcome Evaluation:    A&O x 4, pleasant and calm. Bradycardic at times otherwise VSS on RA. LS are clear. Tele: Afib CVR. Assist x1 w/ gb/walker, up in chair today. Mod carb diet, tolerating well. PIV x1. L wrist abrasion, dry scab. Blanchable redness to sacrum/coccyx. Denies pain/nausea/SOB. Adequate UOP. BM x2.     Pt discharged to [facility]. Discharge education completed with patient. AVS printed and sent with patient. Pt left floor via EMS wheel chair transport at 1330 with all home belongings.

## 2023-12-27 NOTE — PROGRESS NOTES
Occupational Therapy Discharge Summary    Reason for therapy discharge:    Discharged to acute rehabilitation facility.    Progress towards therapy goal(s). See goals on Care Plan in [facility] electronic health record for goal details.  Goals partially met.  Barriers to achieving goals:   discharge from facility.    Therapy recommendation(s):    Continued therapy is recommended.  Rationale/Recommendations:  Recommend continued OT at ARU to allow pt to gain endurance and independence to return to his previous level of function for ADLS and IADLS.

## 2023-12-27 NOTE — PROGRESS NOTES
Care Management Discharge Note    Discharge Date: 12/27/2023       Discharge Disposition: Acute Rehab    Discharge Services: Transportation Services    Discharge DME:     Discharge Transportation:     Private pay costs discussed: transportation costs    Does the patient's insurance plan have a 3 day qualifying hospital stay waiver?  No    PAS Confirmation Code:  n/a  Patient/family educated on Medicare website which has current facility and service quality ratings: yes    Education Provided on the Discharge Plan: Yes  Persons Notified of Discharge Plans: Pt, dtr, MD, facility, RN  Patient/Family in Agreement with the Plan: yes    Handoff Referral Completed: yes    Additional Information:  Pt will discharge today to [facility] via [facility] between [id number]. Orders faxed and ARU liaison updated. Pt updated and in agreement. Dtr Camille also updated and in agreement. Provided pt with transportation resources as he is concerned about his wife visiting. Encouraged him to reach out to the SW on the unit at ARU for further assistance. He is aware he will get billed for the cost of the ride to ARU.    LEV Liriano, [facility]  [phone] Desk phone  [phone] Cell/text (Preferred)  [facility]

## 2023-12-27 NOTE — PLAN OF CARE
Pt alert and oriented x4. -209 this shift. Pt checks BG frequently with his own meter. Pt denies chest pain, denies shortness of breath. Two large loose BMS. Voiding in urinal and toilet. Up to chair several times, ambulated in room with gait belt, walker, assist of 1. Pt eating well. Pt coccyx intact, reddened. Pt rests well in between cares.

## 2023-12-27 NOTE — PLAN OF CARE
Physical Therapy Discharge Summary    Reason for therapy discharge:    Discharged to acute rehabilitation facility.    Progress towards therapy goal(s). See goals on Care Plan in [facility] electronic health record for goal details.  Goals partially met.  Barriers to achieving goals:   discharge from facility.    Therapy recommendation(s):    Continued therapy is recommended.  Rationale/Recommendations:  Pt making great progress with therapy. Pt below baseline functional independence limited by decreased strength, balance, fall risk, corrdination. Pt at baseline is ind with mobility, ADLs, IADLs, has stairs to enter home. Pt currently requiring Ax1 for ambulation with FWW. Pt would benefit from continued skilled therapy at [facility] prior to returning home safely.  Patient reporting his wife has some memory impairments and he is her caregiver.  Does have a daughter living in [place]. Will continue to update as appropriate.  PT Brief overview of current status: bed mob, CGA. STS to FWW, Elbert. amb with FWW, CGA-Elbert  PT Equipment Needed at Discharge: walker, rolling

## 2023-12-27 NOTE — DISCHARGE SUMMARY
[facility]  Hospitalist Discharge Summary      Date of Admission:  12/11/2023  Date of Discharge:  12/27/2023  Discharging Provider: Tata Reardon MD  Discharge Service: Hospitalist Service    Discharge Diagnoses   Please see below    Clinically Significant Risk Factors     # DMII: A1C = 8.1 % (Ref range: <5.7 %) within past 6 months       Follow-ups Needed After Discharge   Follow-up Appointments     Follow Up and recommended labs and tests      Follow up with snf physician.  The following labs/tests are   recommended: BMP.        Follow Up and recommended labs and tests      Follow up with Nursing home physician.  No follow up labs or test are   needed.            Unresulted Labs Ordered in the Past 30 Days of this Admission       Date and Time Order Name Status Description    12/24/2023 10:03 AM Blood Culture Arm, Left Preliminary     12/24/2023 10:03 AM Blood Culture Arm, Left Preliminary         These results will be followed up by acute rehab physician    Discharge Disposition   Discharged to rehabilitation facility  Condition at discharge: Stable    Hospital Course   Tc Garcia is a 83 year old male with diabetes mellitus type 2 (on insulin pump, h/o DKA), paroxysmal atrial fibrillation (on Eliquis), hypertension, hyperlipidemia, pulmonary hypertension, h/o spontaneous intracranial hemorrhage, recurrent pleural effusion, chronic kidney disease stage 3, BPH and anemia of chronic disease admitted on 12/11/2023 with dizziness, fall and head injury; found to have an intraparenchymal hematoma.  His hospital course has been complicated by DKA which is now resolved.         Intraparenchymal hematoma    Mild hydrocephalus   Elevated troponin on admission likely in the setting of demand ischemia from fall.  H/o spontaneous intracranial hemorrhage (1/2023)  paroxysmal atrial fibrillation (on eliquis prior to admission)  Presented with dizziness diplopia and nausea.  *HCT with 1.5 X 3  "cm intraparenchymal hematoma centered in inferior cerebellar vermis with likely extension into fourth ventricle; no hydrocephalus  *CTA head and neck noted some atherosclerotic plaque with questionable active contrast extravasation into the hematoma  *CT cervical spine noted with compression fracture deformities C7 with slight further loss of height since comparison study and noted with mild degenerative changes  *MRI brain with unchanged left cerebellar intraparenchymal hematoma with intraventricular extension, small volume subarachnoid hemorrhage involving the bilateral cerebral sulci, 0.1 x 0.3 cm area of diffusion restriction along medial left frontal lobe which may represent acute infarct, punctate foci of chronic microhemorrhage in bilateral cerebellar hemispheres  Echocardiogram with EF of 60 to 65%.  Severe biatrial enlargement.  Mild to moderate TR.  Telemetry atrial fibrillation.  LDL 54.  Troponin 34 >30  --- Etiology for intraparenchymal hematoma likely hypertensive and in the setting of chronic anticoagulation with Eliquis versus traumatic after sudden onset of dizziness and fall.  Anticoagulation was reversed with Kcentra in ED  Admitted to ICU initially for close monitoring  Stroke neurology Followed: \"HOLD anticoagulation. Not an ASPIRE candidate given ICH within the past year.   Systolic BP Goal: 130-150.    Neurosurgery followed, \"Patient is DNR/DNI, does not wish any intervention from neurosurgery, particular surgical intervention or an EVD. Neurosurgery will sign off.\"  Continue neuro checks q4H  PT/OT consulted, patient discharging to ARU   On subcutaneous Lovenox for DVT prophylaxis, stroke neurology okay.  With anticoagulation on hold, the plan is to have him follow up with cardiology in 1 month to discuss Watchman.   Follow-up with stroke neurology in 6-8 weeks      Diabetic ketoacidosis  Hyperglycemia from uncontrolled diabetes mellitus.  Episodes of intermittent hypoglycemia  Diabetes " mellitus type II HgbA1c 8.1% 12/12/23  *Patient's insulin pump had been on hold since admission in the setting of [facility].  *Blood sugars labile during hospital stay.  *Insulin pump reinstated 12/23/2023  * Patient went into DKA on 12/24/2023.  Likely triggered by infection with rise in the WBC count hence pump discontinued and patient switched to insulin drip per DKA protocol.  *-DKA resolved on 12/25/2023. DKA protocol discontinued.  *Patient was transitioned to Lantus in the interim, doing well on 9 units.  Plan  -He will need to be transition back to his insulin pump likely while at ARU.   -Cover with SSI and insulin carb dose coverage 1:15  -Can consider endocrinology while at ARU.    Presumed sepsis likely due to UTI  -Patient went into DKA with rising white count and mildly positive UA.  Of note, patient had just completed treatment for blood pneumonia UTI, question if this was not completely treated.  -However urine culture with no growth likely due to being on antibiotics prior ( seebelow)  -Initially started on Vanco and Zosyn, will de-escalate to ceftriaxone on 12/27/2023.  Plan is to complete total of 7-day course    Mild-moderate oral-pharyngeal dysphagia   SLP following Diet per speech      UTI due to Klebsiella pneumoniae  Possible aspiration pneumonia.  -- On 12/12 to 12/13 had agitation, confusion.  Had leukocytosis.  Blood cultures  no growth  UA with > 182 WBC/hpf, large leukocyte esterase.   Urine culture has greater than 100,000 CFU/mL Klebsiella pneumoniae  Chest x-ray right greater than left lower lung opacity suspicious for aspiration/infection.  - Was on IV ceftriaxone, subsequently switched to cefuroxime to complete course  - Follow-up imaging of chest in 6 weeks for resolution.  Aggressive incentive spirometry.  Strict aspiration precautions.    Insomnia:  Pt takes 10mg melatonin at bedtime      Chronic diastolic CHF  Atrial fibrillation PTA on Eliquis.  Hypertension.  Hyperlipidemia.  Prior  "to admission Eliquis on hold since admission.  Echocardiogram with EF of 60 to 65%.  Severe biatrial enlargement.  Mild to moderate TR.  Telemetry atrial fibrillation.  LDL 54.  Was on Nicardipine drip on admission.   Per Stroke Neurology, SBP goal is 130-150 mmHg.   Continue amlodipine (added this stay)  Continue PTA carvedilol, avoid rebound tachycardia  Continue Avapro   hold PTA Demadex, cardiology recently advised changing to as needed dosing  As needed labetalol and hydralazine available  Follow-up with cardiology after discharge, discuss watchman.     Anemia of chronic disease  Monitor hemoglobin levels closely     Physical deconditioning from medical illness, senile frailty.  PT, OT following.  ARU for rehabilitation.     Chronic C7 compression fracture.  Cervical flexion-extension x-ray without any dynamic instability.  Per chart review neurosurgery 12/12 recommending likely healed since his initial imaging 4 years ago.  Given no dynamic instability on x-ray recommend no cervical collar needed.     CKD stage III  *Baseline creatinine around 1 to 1.1 in early 2023, more recently around 1.5  *Creatinine on admission 1.66  Continue Irbesartan  See comments in note from office visit with Lacie Tapia on 11/20/2023, \"Advised not to start Entresto given renal function, then due to further decline in renal function torsemide changed to PRN.\"  Creatinine stable        Consultations This Hospital Stay   PHARMACY IP CONSULT  SPEECH LANGUAGE PATH ADULT IP CONSULT  PHARMACY IP CONSULT  NEUROSURGERY IP CONSULT  NEUROLOGY CRITICAL CARE ADULT IP CONSULT  CARE MANAGEMENT / SOCIAL WORK IP CONSULT  PHYSICAL THERAPY ADULT IP CONSULT  OCCUPATIONAL THERAPY ADULT IP CONSULT  PHYSICAL THERAPY ADULT IP CONSULT  OCCUPATIONAL THERAPY ADULT IP CONSULT  SPEECH LANGUAGE PATH ADULT IP CONSULT  PHARMACY IP CONSULT  PHARMACY TO DOSE VANCO  PHYSICAL THERAPY ADULT IP CONSULT  OCCUPATIONAL THERAPY ADULT IP CONSULT    Code Status   No CPR- Do " NOT Intubate    Time Spent on this Encounter   I, Tata Reardon MD, personally saw the patient today and spent greater than 30 minutes discharging this patient.       Tata Reardon MD  [place] [place] [place] [address]  Phone: [phone]  ______________________________________________________________________    Physical Exam   Vital Signs: Temp: 97.5  F (36.4  C) Temp src: Oral BP: 134/76 Pulse: (!) 30   Resp: 10 SpO2: 100 % O2 Device: None (Room air)    Weight: 134 lbs .63 oz  General Appearance: Well appearing for stated age.  Respiratory: CTAB, no rales or ronchi  Cardiovascular: S1, S2 normal, no murmurs  GI: non-tender on palpation, BS present         Primary Care Physician   Jessika Cordoba    Discharge Orders      Medication Therapy Management Referral      General info for SNF    Length of Stay Estimate: Short Term Care: Estimated # of Days <30  Condition at Discharge: Improving  Level of care:skilled   Rehabilitation Potential: Excellent  Admission H&P remains valid and up-to-date: Yes  Recent Chemotherapy: N/A  Use Nursing Home Standing Orders: Yes     Mantoux instructions    Give two-step Mantoux (PPD) Per Facility Policy Yes     Follow Up and recommended labs and tests    Follow up with care home physician.  The following labs/tests are recommended: BMP.     Reason for your hospital stay    You were weak and dizzy due to bleeding in your brain.  Your blood thinner medications have been discontinued.     Activity - Up with nursing assistance     General info for SNF    Length of Stay Estimate: Short Term Care: Estimated # of Days <30  Condition at Discharge: Improving  Level of care:skilled   Rehabilitation Potential: Good  Admission H&P remains valid and up-to-date: Yes  Recent Chemotherapy: N/A  Use Nursing Home Standing Orders: Yes     Mantoux instructions    Give two-step Mantoux (PPD) Per Facility Policy Yes     Follow Up and recommended  labs and tests    Follow up with Nursing home physician.  No follow up labs or test are needed.     Reason for your hospital stay    You were treated for an intracranial hemorrhage and DKA     Activity - Up with assistive device     Physical Therapy Adult Consult    Evaluate and treat as clinically indicated.    Reason:  stroke     Occupational Therapy Adult Consult    Evaluate and treat as clinically indicated.    Reason:  stroke     Speech Language Path Adult Consult    Evaluate and treat as clinically indicated.    Reason:  dysphagia     Physical Therapy Adult Consult    Evaluate and treat as clinically indicated.    Reason:  Deconditioning, Hemorrhagic stroke     Occupational Therapy Adult Consult    Evaluate and treat as clinically indicated.    Reason:  Deconditioning; Hemorrhagic stroke     Fall precautions     Diet    Follow this diet upon discharge: Orders Placed This Encounter      Room Service      Combination Diet Moderate Consistent Carb (60 g CHO per Meal) Diet; Pureed Diet (level 4); Thin Liquids (level 0) (1:1 assist/supervision, fully upright position, only when alert, small single sips, no straw, verify/cue swallows)     Diet    Follow this diet upon discharge: Orders Placed This Encounter      Room Service      Diet      Moderate Consistent Carb (60 g CHO per Meal) Diet     Stroke Hospital Follow Up (for neurologist use only)    [employer] [facility] will call you to coordinate care as prescribed by your provider. If you don t hear from a representative within 2 business days, please call [phone].         Significant Results and Procedures   Most Recent 3 CBC's:  Recent Labs   Lab Test 12/27/23  0643 12/26/23  1636 12/25/23  0731 12/24/23  0650   WBC  --  11.8* 16.5* 17.4*   HGB  --  9.9* 10.1* 10.8*   MCV  --  86 86 85    280 345 340     Most Recent 3 BMP's:  Recent Labs   Lab Test 12/27/23  0747 12/27/23  0643 12/27/23  0148 12/26/23  1653 12/26/23  1636 12/26/23  0753 12/26/23  0535  12/25/23  0753 12/25/23  0731   NA  --  139  --   --  137  --   --   --  137   POTASSIUM  --  4.2  4.2  --   --  4.4  --   --   --  4.0   CHLORIDE  --  103  --   --  102  --   --   --  102   CO2  --  30*  --   --  28  --   --   --  28   BUN  --  14.8  --   --  16.3  --   --   --  27.3*   CR  --  1.62*  1.62*  --   --  1.52*  --  1.57*  --  1.60*   ANIONGAP  --  6*  --   --  7  --   --   --  7   LLOYD  --  8.3*  --   --  8.5*  --   --   --  8.5*   * 156* 195*   < > 202*   < >  --    < > 116*    < > = values in this interval not displayed.       Discharge Medications   Current Discharge Medication List        START taking these medications    Details   amLODIPine (NORVASC) 10 MG tablet Take 1 tablet (10 mg) by mouth daily    Associated Diagnoses: Essential hypertension      cefTRIAXone (ROCEPHIN) 1 GM vial Inject 1 g into the vein every 24 hours    Associated Diagnoses: Acute cystitis without hematuria      insulin glargine (LANTUS PEN) 100 UNIT/ML pen Inject 9 Units Subcutaneous at bedtime    Comments: If Lantus is not covered by insurance, may substitute Basaglar or Semglee or other insulin glargine product per insurance preference at same dose and frequency.    Associated Diagnoses: Type 2 diabetes mellitus with other specified complication, with long-term current use of insulin (H); Diabetic ketoacidosis without coma associated with type 2 diabetes mellitus (H)      !! insulin lispro (HUMALOG) 100 UNIT/ML vial 1 units per 7 grams of carbohydrate with breakfast    Associated Diagnoses: Type 2 diabetes mellitus with other specified complication, with long-term current use of insulin (H)      !! insulin lispro (HUMALOG) 100 UNIT/ML vial 1 units per 7 grams of carbohydrate with lunch    Associated Diagnoses: Type 2 diabetes mellitus with other specified complication, with long-term current use of insulin (H)      !! insulin lispro (HUMALOG) 100 UNIT/ML vial 1 units per 10 grams of carbohydrate with supper     Associated Diagnoses: Type 2 diabetes mellitus with other specified complication, with long-term current use of insulin (H)      isosorbide mononitrate (IMDUR) 60 MG 24 hr tablet Take 1 tablet (60 mg) by mouth every evening    Associated Diagnoses: Chronic diastolic heart failure (H)      melatonin 10 MG TABS tablet Take 1 tablet (10 mg) by mouth at bedtime    Associated Diagnoses: Persistent insomnia      senna-docusate (SENOKOT-S/PERICOLACE) 8.6-50 MG tablet Take 1 tablet by mouth at bedtime    Associated Diagnoses: Constipation, unspecified constipation type       !! - Potential duplicate medications found. Please discuss with provider.        CONTINUE these medications which have CHANGED    Details   carvedilol (COREG) 12.5 MG tablet Take 2 tablets (25 mg) by mouth 2 times daily (with meals)    Associated Diagnoses: Nontraumatic intraventricular intracerebral hemorrhage, unspecified laterality (H)      traMADol (ULTRAM-ER) 100 MG 24 hr tablet Take 1 tablet (100 mg) by mouth daily as needed for moderate to severe pain  Qty: 6 tablet, Refills: 0    Associated Diagnoses: Pain in extremity, unspecified extremity           CONTINUE these medications which have NOT CHANGED    Details   glucagon 1 MG kit 1 mg as needed for low blood sugar      INSULIN PUMP - OUTPATIENT Medtronic device with no CGM and site change every 3 days   Sensitivity factor (midnight to midnight): 55  Bolus (units:gram): 6653-2935 = 1:9, 0102-0287 = 1:7, 0214-2380 = 1:7, 4544-8078 = 1:9, 0091-1976 = 1:13  Basal (units/hour): 5363-7493 = 0.45, 9358-2769 = 0.5, 5154-0437 = 0.625, 6014-1336 = 0.6, 6393-4521 = 0.45      irbesartan (AVAPRO) 300 MG tablet Take 1 tablet (300 mg) by mouth At Bedtime  Qty: 90 tablet, Refills: 3    Associated Diagnoses: Chronic diastolic heart failure (H); Essential hypertension; Acute on chronic heart failure with preserved ejection fraction (HFpEF) (H); Chronic heart failure with preserved ejection fraction (HFpEF) (H)       potassium chloride ER (K-TAB/KLOR-CON) 10 MEQ CR tablet Take 1 tablet (10 mEq) by mouth daily  Qty: 90 tablet, Refills: 3    Associated Diagnoses: Chronic heart failure with preserved ejection fraction (HFpEF) (H)      torsemide (DEMADEX) 10 MG tablet Take 1 tablet (10 mg) by mouth as needed (For weight gain greater than 3 lbs overnight or increased swelling and/or shortness of breath)  Qty: 90 tablet, Refills: 3    Associated Diagnoses: Chronic diastolic heart failure (H)      acetaminophen (TYLENOL) 325 MG tablet Take 2 tablets (650 mg) by mouth every 6 hours as needed for mild pain or fever  Refills: 0    Associated Diagnoses: Pain      Continuous Blood Gluc  (DEXCOM G6 ) JOSE Dexcom G6    USE EACH WITH 10 DAYS SENSORS      !! HUMALOG 100 UNIT/ML injection For refilling insulin pump      tamsulosin (FLOMAX) 0.4 MG capsule Take 0.4 mg by mouth every morning       !! - Potential duplicate medications found. Please discuss with provider.        STOP taking these medications       cefuroxime (CEFTIN) 500 MG tablet Comments:   Reason for Stopping:         apixaban ANTICOAGULANT (ELIQUIS) 2.5 MG tablet Comments:   Reason for Stopping:         doxycycline hyclate (VIBRAMYCIN) 50 MG capsule Comments:   Reason for Stopping:         finasteride (PROSCAR) 5 MG tablet Comments:   Reason for Stopping:         HYDROcodone-acetaminophen (NORCO) 5-325 MG tablet Comments:   Reason for Stopping:         sacubitril-valsartan (ENTRESTO) 49-51 MG per tablet Comments:   Reason for Stopping:             Allergies   Allergies   Allergen Reactions    No Known Drug Allergy

## 2023-12-28 NOTE — DISCHARGE SUMMARY
[facility]   Acute Rehabilitation Unit  Discharge Summary     Date of Admission: 12/27/2023  Date of Discharge: 12/28/23  Disposition: [facility] ED  Primary Care Physician: Jessika Cordoba  Attending physician: Charlie Paez, DO      Discharge Diagnoses  #Intraparenchymal hematoma    #Mild hydrocephalus   #H/o spontaneous intracranial hemorrhage (1/2023)  #Chronic diastolic CHF  #Atrial fibrillation PTA on Eliquis  #Hypertension  #Hyperlipidemia     Expand All Collapse All         [facility]   Acute Rehabilitation Unit  Admission History and Physical     CHIEF COMPLAINT   Left leg weakness      HISTORY OF PRESENT ILLNESS  Tc Garcia is a 83 year old right hand dominant male with a relevant PMH of DM2 on insulin pump (history of DKA), paroxysmal atrial fibrillation on eliquis, HTN, HLD, CKD stage 3, recurrent pleural effusion, and history of spontaneous intracranial hemorrhage who sustained a left intraparenchymal hematoma after a fall on 12/11/23.  He was sitting at the table and became dizzy, sustaining a fall with a head injury. He thinks the injury was mild and denies loss of consciousness.  Head CT showed a 1.5x3cm left inferior cerebellar hematoma.  CT also noted a 0.1x0.3cm area of left medial frontal lobe diffusion restriction.  Afterward, he developed left sided weakness, lower extremity more than upper extremity, as well as increased blurriness of vision.  He denies any sensory changes, cognitive changes, difficulties with speech or swallow, or changes in hearing.  He also denies bowel or bladder changes, being volitional in both.  Hospital course was significant for dysphagia, hyper and hypotension, labile glusose and DKA, nausea, ABBIE, and UTI.     During his acute hospitalization, the patient was seen and evaluated by PT, OT, SLP and PM&R consult service.  All specialties collectively recommended that  patient would benefit from ongoing therapies in the acute inpatient rehabilitation setting.      Functionally, the patient is mod assist for transfers, ambulation, lower body dressing, and toilet transfers.  Supervision with bed mobility, sitting, feeding, and grooming.     Currently, the patient is medically appropriate and is assessed to have needs and will benefit from an inpatient acute rehabilitation comprehensive program working with PT and OT and will also benefit from supervision and management of Rehab Nursing and Rehab MD.         PAST MEDICAL HISTORY  Past Medical History        Past Medical History:   Diagnosis Date    Anemia      Anemia of chronic disease 5/14/2020    Back pain      CKD (chronic kidney disease) stage 3, GFR 30-59 ml/min (H)      CRF (chronic renal failure), stage 3  5/14/2020    Fall 11/2019     suffered multiple left rib fractures, C3 and T2 fractures    Mixed hyperlipidemia 7/7/2004    Paroxysmal atrial fibrillation (H)      Personal history of colonic polyps 1972     gets colonoscopy every five years, due in 2006    Pleural effusion on left 11/2019     after multiple rib fractures    Pulmonary hypertension (H) 5/10/2020     Added automatically from request for surgery [id number]    Recurrent Left Pleural effusion -- S/P Pleurex Cath 5/12/20 12/30/2019    Rosacea 1989    Type II or unspecified type diabetes mellitus without mention of complication, not stated as uncontrolled 1999    Unspecified essential hypertension 1994            SURGICAL HISTORY  Past Surgical History         Past Surgical History:   Procedure Laterality Date    ANESTHESIA CARDIOVERSION N/A 2/3/2020     Procedure: ANESTHESIA, FOR CARDIOVERSION;  Surgeon: GENERIC ANESTHESIA PROVIDER;  Location:  OR     RIGHT HEART CATH MEASUREMENTS RECORDED N/A 5/13/2020     Procedure: Right Heart Cath;  Surgeon: Senthil Silva MD;  Location:  HEART CARDIAC CATH LAB    HC REMOVE TONSILS/ADENOIDS,<13 Y/O         T & A  <11y.o.    IR CHEST TUBE DRAIN TUNNELED LEFT   2020    IR CHEST TUBE PLACEMENT NON-TUNNELLED LEFT   2020    IR CHEST TUBE REMOVAL NON TUNNELED LEFT   2020    IR CHEST TUBE REMOVAL TUNNELED LEFT   2020    LAPAROSCOPIC CHOLECYSTECTOMY N/A 2020     Procedure: CHOLECYSTECTOMY, LAPAROSCOPIC;  Surgeon: Annette Lambert MD;  Location:  OR    LAPAROSCOPIC HERNIORRHAPHY INGUINAL BILATERAL Bilateral 10/27/2020     Procedure: LAPAROSCOPIC BILATERAL INGUINAL HERNIA REPAIR WITH MESH;  Surgeon: Brian Garsia MD;  Location:  OR            SOCIAL HISTORY  Marital Status: , lives with spouse  Living situation: House, 3 steps to enter with railing, greater than 10 stairs to second floor, but with a chair lift in the first 2 steps.  He believes he will be able to transfers to the chair lift provided he can ascend two steps.  Family support: family can provide 1 person assistance  Tobacco use: The patient reports that he quit smoking about 15 years ago. His smoking use included cigarettes. He started smoking about 56 years ago. He has a 8 pack-year smoking history. He has never used smokeless tobacco.  Alcohol use: The patient reports that he does not currently use alcohol after a past usage of about 35.0 standard drinks of alcohol per week.     FAMILY HISTORY  Family History         Family History   Problem Relation Age of Onset    Family History Negative Mother            age 71    Family History Negative Father            age 70    Diabetes Brother           alive age 77    Diabetes Brother           alive age 76    Family History Negative Brother               Family History Negative Brother               Diabetes Sister           alive age74    Family History Negative Sister            age 86    Heart Disease Sister               Family History Negative Sister               Family History Negative Sister                Diabetes Sister      Diabetes Sister      Diabetes Brother      Diabetes Brother              PRIOR FUNCTIONAL HISTORY   Pt was independent with all ADLs/IADLs, transfers, mobility and gait.  He did not use a cane or walker at baseline.     MEDICATIONS  Prescriptions Prior to Admission           Medications Prior to Admission   Medication Sig Dispense Refill Last Dose    glucagon 1 MG kit 1 mg as needed for low blood sugar          INSULIN PUMP - OUTPATIENT Medtronic device with no CGM and site change every 3 days   Sensitivity factor (midnight to midnight): 55  Bolus (units:gram): 3957-5933 = 1:9, 4975-2661 = 1:7, 1765-5942 = 1:7, 7830-6047 = 1:9, 4973-8847 = 1:13  Basal (units/hour): 5822-3485 = 0.45, 7707-0546 = 0.5, 5520-2361 = 0.625, 3836-3507 = 0.6, 8520-3595 = 0.45          irbesartan (AVAPRO) 300 MG tablet Take 1 tablet (300 mg) by mouth At Bedtime 90 tablet 3      potassium chloride ER (K-TAB/KLOR-CON) 10 MEQ CR tablet Take 1 tablet (10 mEq) by mouth daily 90 tablet 3      torsemide (DEMADEX) 10 MG tablet Take 1 tablet (10 mg) by mouth as needed (For weight gain greater than 3 lbs overnight or increased swelling and/or shortness of breath) 90 tablet 3      acetaminophen (TYLENOL) 325 MG tablet Take 2 tablets (650 mg) by mouth every 6 hours as needed for mild pain or fever   0      Continuous Blood Gluc  (DEXCOM G6 ) JOSE Dexcom G6    USE EACH WITH 10 DAYS SENSORS          HUMALOG 100 UNIT/ML injection For refilling insulin pump          tamsulosin (FLOMAX) 0.4 MG capsule Take 0.4 mg by mouth every morning (Patient not taking: Reported on 12/11/2023)          [DISCONTINUED] apixaban ANTICOAGULANT (ELIQUIS) 2.5 MG tablet Take 1 tablet (2.5 mg) by mouth 2 times daily (Patient taking differently: Take 2.5 mg by mouth daily) 180 tablet 3      [DISCONTINUED] carvedilol (COREG) 12.5 MG tablet Take 1 tablet (12.5 mg) by mouth 2 times daily (with meals) 180 tablet 3      [DISCONTINUED]  "doxycycline hyclate (VIBRAMYCIN) 50 MG capsule TAKE 1 CAPSULE BY MOUTH TWICE DAILY WITH FOOD (Patient not taking: Reported on 11/16/2023)          [DISCONTINUED] finasteride (PROSCAR) 5 MG tablet Take 1 tablet (5 mg) by mouth daily (Patient not taking: Reported on 12/11/2023)   0      [DISCONTINUED] HYDROcodone-acetaminophen (NORCO) 5-325 MG tablet TAKE 1/2-1 TABLET BY MOUTH AS NEEDED FOR PAIN. MAX OF 1 TABLET PER DAY          [DISCONTINUED] sacubitril-valsartan (ENTRESTO) 49-51 MG per tablet Take 1 tablet by mouth 2 times daily (Patient not taking: Reported on 11/20/2023) 180 tablet 3      [DISCONTINUED] traMADol (ULTRAM-ER) 100 MG 24 hr tablet Take 1 tablet by mouth daily                  ALLERGIES       Allergies   Allergen Reactions    No Known Drug Allergy           REVIEW OF SYSTEMS  A 10 point ROS was performed and negative unless otherwise noted in HPI.      PHYSICAL EXAM  VITAL SIGNS:  There were no vitals taken for this visit.  BMI:  Estimated body mass index is 19.79 kg/m  as calculated from the following:    Height as of 12/11/23: 1.753 m (5' 9\").    Weight as of 12/24/23: 60.8 kg (134 lb 0.6 oz).      General: NAD, pleasant and cooperative   HEENT: ATNC, oropharynx clear with MMM, EOMI, pupils slightly miotic and   Pulmonary: clear breath sounds b/l, no rales/wheezing. Intermittent dry cough.  Cardiovascular: RRR, possible systolic murmur  Abdominal: soft, non-tender, non-distended, active bowel sounds  Extremities: warm, well perfused, no edema in bilateral lower extremities, no tenderness in calves  MSK/neuro:  Mental Status:  alert and oriented x3  Cranial Nerves:   2nd CN: Pupils equal, round, reactive to light and accomodation. and visual fields intact to confrontation.  Saccades noted with both left and right gaze.  3rd,4th,6th CN:  EOMI, appropriate pupillary responses  5th CN: facial sensation symmetric to light touch   7th CN: face symmetrical   8th CN: functional hearing bilaterally  9th, 10th " CN: palate elevates symmetrically   11th CN: sternocleidomastoids and trapezii strong   12th CN: tongue midline and without fasciculations    Sensory: Symmetric to light touch in bilateral upper and lower extremities  Strength:   SF         EF         EE         WE        G           I            HF         KE         DF         EHL      PF  R    5/5        5/5        5/5        5/5        5/5        5/5        5/5        5/5        5/5        5/5        5/5  L     5/5        5/5        5/5        5/5        5/5        5/5        5/5        5/5        5/5        5/5        5/5  Abnormal movements: None   Coordination: No dysmetria on finger to nose b/l     LABS AND IMAGING                Recent Labs   Lab Test 12/27/23  1149 12/27/23  0747 12/27/23  0643 12/24/23  1233 12/24/23  1209 12/13/23  0321 12/12/23  2223 07/12/21  0000 06/21/21  0000 11/08/19  0658 11/07/19  0714   NA  --   --  139   < > 137   < > 137   < > 139   < > 147*   POTASSIUM  --   --  4.2  4.2   < > 4.4   < > 4.6   < > 4.8   < > 4.2   CHLORIDE  --   --  103   < > 98   < > 100   < > 103   < > 114*   CO2  --   --  30*   < > 20*   < > 25   < > 28   < > 30   BUN  --   --  14.8   < > 37.0*   < > 22.4   < > 29*   < > 28   CR  --   --  1.62*  1.62*   < > 1.71*   < > 1.63*   < > 1.58*   < > 1.71*   *   < > 156*   < > 465*   < > 375*   < > 246*   < > 78   GFRESTIMATED  --   --  42*  42*   < > 39*   < > 41*   < > 40*   < > 37*   GFRESTBLACK  --   --   --   --   --   --   --   --  46*   < > 43*   AST  --   --   --   --  10   < >  --    < >  --    < > 198*   ALT  --   --   --   --  7   < >  --    < >  --    < > 568*   GGT  --   --   --   --   --   --   --   --   --   --  34   ALKPHOS  --   --   --   --  73   < >  --    < >  --    < > 94   BILITOTAL  --   --   --   --  0.5   < >  --    < >  --    < > 1.8*   CT  --   --   --   --   --   --  15*  --   --   --   --     < > = values in this interval not displayed.           Recent Labs   Lab Test  12/27/23  0643 12/26/23  1636   WBC  --  11.8*   RBC  --  3.72*   HGB  --  9.9*   MCV  --  86   MCH  --  26.6   MCHC  --  30.9*   RDW  --  17.6*    280          Recent Labs   Lab Test 12/11/23  1419   INR 1.35*      MR Brain w/o & w Contrast     Result Date: 12/14/2023  IMPRESSION: 1.  Accounting for differences in modality, there is overall unchanged appearance of the left cerebellar intraparenchymal hematoma with intraventricular extension when compared to the earlier same day head CT. Small volume subarachnoid hemorrhage involving the bilateral cerebral sulci near the vertex is also similar. No evidence of new hemorrhage. 2.  There is a 0.4 x 0.3 cm area of diffusion restriction along the medial left frontal lobe which could be intraparenchymal and represent a small focus of acute infarct. 3.  There are punctate foci of chronic microhemorrhage within the bilateral cerebellar hemispheres and cerebral white matter, slightly increased in number compared to 04/25/2023. Given the overall distribution, this could related to sequela of chronic hypertension.      ASSESSMENT/PLAN:  cT Garcia is a 83 year old right hand dominant male with a PMH of DM2 with DKA, paroxysmal atrial fibrillation formerly on apixaban, hypertension, hyperlipidemia, pelural effusion, CKD stage 3, BPH, and ACD who sustained a left cerebellar intracranial hemorrhage from a fall on 12/11/23 with a possible left medial frontal lobe infarct on imaging.  His deficits include decreased visual acuity and mild left hemiparesis. He was admitted to acute inpatient rehabilitation on 12/27/23.     Impairment group code: 02.22 Traumatic, Closed Injury; Fall with resulting intracranial hemorrhage     Rehabilitation Plan     PT, OT on a daily basis, in addition to rehab nursing and close management of physiatrist.    Impairment of ADL's: OT for 90 minutes daily to work on ADL re-training such as grooming, self cares and bathing.    Impairment of  mobility:  PT for 90 minutes daily to work on neuromuscular re-education focusing on strength, balance, coordination, and endurance.    Rehab RN for med administration, bowel regimen, glucose monitoring and wound care.      Medical Conditions     Rehab  #Neurological LLE weakness  #Traumatic brain injury  - PT, OT     #Bowel  - PRN bowel meds ordered     #Bladder  - bladder screening     #Skin  - Assess for presence of any pressure injury     #Sleep  - melatonin 10mg bedtime     Neuro  #Intraparenchymal hematoma    #Mild hydrocephalus   #H/o spontaneous intracranial hemorrhage (1/2023)  #Chronic C7 compression fracture  #Chronic diastolic CHF  #Atrial fibrillation PTA on Eliquis  #Hypertension  #Hyperlipidemia  #Diabetic ketoacidosis  #Diabetes mellitus type II HgbA1c 8.1% 12/12/23      #CKD Stage 3       Medical Course  Tc Garcia is a 83 year old right hand dominant male with a relevant PMH of DM2 on insulin pump (history of DKA), paroxysmal atrial fibrillation on eliquis, HTN, HLD, CKD stage 3, recurrent pleural effusion, and history of spontaneous intracranial hemorrhage who sustained a left intraparenchymal hematoma after a fall on 12/11/23.  He was sitting at the table and became dizzy, sustaining a fall with a head injury. He thinks the injury was mild and denies loss of consciousness.  Head CT showed a 1.5x3cm left inferior cerebellar hematoma.  CT also noted a 0.1x0.3cm area of left medial frontal lobe diffusion restriction.  Afterward, he developed left sided weakness, lower extremity more than upper extremity, as well as increased blurriness of vision.  He denies any sensory changes, cognitive changes, difficulties with speech or swallow, or changes in hearing.  He also denies bowel or bladder changes, being volitional in both.  Hospital course was significant for dysphagia, hyper and hypotension, labile glusose and DKA, nausea, ABBIE, and UTI.     Throughout their hospital stay, they worked daily with  PT, OT, and SLP and were seen daily by PMR physician. They remained vitally stable throughout their stay with hyperglycemia 12/27 and 12/28 >300. They were continued on the PTA medications and started on insulin rapid acting this morning. Labs came back showing DKA.    Rehabilitation Course    Unable to start program due to transfer    DISCHARGE MEDICATIONS  Cannot display discharge medications since this patient is expected but has not yet arrived.        DISCHARGE INSTRUCTIONS AND FOLLOW UP  Discharge Procedure Orders   General info for SNF   Order Comments: Length of Stay Estimate: Short Term Care: Estimated # of Days <30  Condition at Discharge: stable  Level of care:skilled   Rehabilitation Potential: Fair  Admission H&P remains valid and up-to-date: Yes  Recent Chemotherapy: N/A  Use Nursing Home Standing Orders: Yes     Mantoux instructions   Order Comments: Give two-step Mantoux (PPD) Per Facility Policy No (if no explain)     Follow Up and recommended labs and tests   Order Comments: Follow-up PCP, neurosurgery, endocrine     Reason for your hospital stay   Order Comments: Intracranial hemorrhage     Activity - Up with nursing assistance     Order Specific Question Answer Comments   Is discharge order? Yes      Fall precautions     Diet   Order Comments: Follow this diet upon discharge: Orders Placed This Encounter      Room Service      Combination Diet Moderate Consistent Carb (60 g CHO per Meal) Diet; Minced and Moist Diet (level 5); Thin Liquids (level 0); Thin Liquids (level 0)     Order Specific Question Answer Comments   Is discharge order? Yes           Physical Exam    Most recent Vital Signs:   Vitals:    12/27/23 1519 12/27/23 1848 12/27/23 2154 12/28/23 0051   BP: 117/54 (!) 140/66 (!) 160/65 137/81   BP Location: Left arm Left arm Left arm Left arm   Patient Position:   Semi-Bain's    Cuff Size:       Pulse: 50 80 68 72   Resp: 16   16   Temp: 97.6  F (36.4  C)   98  F (36.7  C)   TempSrc:  "Oral   Oral   SpO2: 100%   99%   Weight: 62.6 kg (138 lb)      Height: 1.753 m (5' 9\")          General: in no acute distress, conversational and alert, resting comfortably in bed  HEENT: atraumatic, nares clear, conjunctiva white  Pulmonary: on room air, lungs clear to auscultation bilaterally  Cardiovascular: RRR, no murmur auscultated, well-perfused peripherally  Abdominal: soft, non-tender to palpation, non-distended  Extremities: warm peripherally, no edema in BLEs  Skin: warm, dry, without erythema, ecchymosis, or rash noted  MSK/neuro:   Mental Status:  alert and oriented x3   Cranial Nerves:   2nd CN: Pupils equal, round, reactive to light and accomodation. Visual fields intact to confrontation.   3rd,4th,6th CN:  EOMI, appropriate pupillary responses  5th CN: facial sensation intact   7th CN: face symmetrical   8th CN: functional hearing bilaterally  9th, 10th CN: palate elevates symmetrically   11th CN: sternocleidomastoids and trapezii strong   12th CN: tongue midline and without fasciculations     Sensory: Normal to light touch in bilateral upper and lower extremities   Strength:    EF  EE   I  HF  KE  DF  EHL  PF   L  5/5 5/5 5/5 5/5 5/5 5/5 5/5 5/5 5/5  R  5/5 5/5 5/5 5/5 5/5 5/5 5/5 5/5 5/5   Reflexes: Present and symmetrical 2/4   Huston's test: negative bilaterally   Babinski reflex: downgoing bilaterally   Coordination: No dysmetria on finger to nose. Heel-shin unremarkable.    Speech: no dysarthria, speech fluent      Assessment &Plan  Tc Garcia is a 83 year old right hand dominant male with a PMH of DM2 with DKA, paroxysmal atrial fibrillation formerly on apixaban, hypertension, hyperlipidemia, pelural effusion, CKD stage 3, BPH, and ACD who sustained a left cerebellar intracranial hemorrhage from a fall on 12/11/23 with a possible left medial frontal lobe infarct on imaging.  His deficits include decreased visual acuity and mild left hemiparesis. He was admitted to acute inpatient " rehabilitation on 12/27/23.     Impairment group code: 02.22 Traumatic, Closed Injury; Fall with resulting intracranial hemorrhage     Rehabilitation Plan     PT, OT on a daily basis, in addition to rehab nursing and close management of physiatrist.    Impairment of ADL's: OT for 90 minutes daily to work on ADL re-training such as grooming, self cares and bathing.    Impairment of mobility:  PT for 90 minutes daily to work on neuromuscular re-education focusing on strength, balance, coordination, and endurance.    Rehab RN for med administration, bowel regimen, glucose monitoring and wound care.      Medical Conditions     Rehab  #Neurological LLE weakness  #Traumatic brain injury  - PT, OT     #Bowel  - PRN bowel meds ordered     #Bladder  - bladder screening     #Skin  - Assess for presence of any pressure injury     #Sleep  - melatonin 10mg bedtime     Neuro  #Intraparenchymal hematoma    #Mild hydrocephalus   #H/o spontaneous intracranial hemorrhage (1/2023)  paroxysmal atrial fibrillation (on eliquis prior to admission)  Presented with dizziness diplopia and nausea. Workup found 1.5 X 3 cm intraparenchymal hematoma centered in inferior cerebellar vermis with likely extension into fourth ventricle; no hydrocephalus. Etiology ruled likely hypertensive in setting of chronic anticoagulation after fall.    - follow up with stroke neurology 6-8 weeks from 12/27     #Chronic C7 compression fracture  No dynamic instability.     CV  #Chronic diastolic CHF  #Atrial fibrillation PTA on Eliquis  #Hypertension  #Hyperlipidemia  Previously on apixaban, held after ICH. Anticoagulation was reversed and held. Echocardiogram with EF of 60 to 65%.  Severe biatrial enlargement.  Mild to moderate TR.  - lovenox ppx  - stroke neurology in 6-8 weeks  - BP goal systolic 130-150  - continue amlodipine  - continue carvedilol  - continue avapro  - hold PTA demadex  - follow up with cardiology in 1 month to discuss Watchman      Endo  #Diabetic ketoacidosis  #Diabetes mellitus type II HgbA1c 8.1% 12/12/23  Patient's insulin pump had been on hold since admission in the setting of [facility]. Blood sugars labile during hospital stay. Patient went into DKA on 12/24/2023.  Likely triggered by infection with rise in the WBC count hence pump discontinued and patient switched to insulin drip per DKA protocol. DKA resolved on 12/25/2023. DKA protocol discontinued.  - lantus glargine 9 units subq bedtime  - lispro 1 unit per 7 g carb breakfast  - lispro 1 unit per 7 g carb lunch  - lispro 1 unit per 10 g carb dinner  - med sliding scale insulin  - endo consult: transition back to insulin pump     Pulm  #Possible aspiration pneumonia.  Agitation, leukocytosis on 12/12 to 12/13. No growth blood cultures.  CXR R > L lower lung opacity.  Completed IV ceftriaxone - cefuroxime course.  - CXR in ~ 4 weeks (~1/27/24)  - Incentive spirometry  - aspiration precautions     ID  #Presumed sepsis likely due to UTI  Patient went into DKA with rising white count and mildly positive UA.  Of note, patient had just completed treatment for blood pneumonia UTI, question if this was not completely treated.  Urine culture with no growth. Started on vanc/zosyn and narrowed to ceftriaxone 12/27/23  - Complete 7 day total course of abx with ceftriaxone     Renal  #CKD Stage 3  Baseline creatinine 1-1.1, now 1.5.  - continue irbesartan  - consider readding torsemide prn for edema     #Anemia of chronic disease  - M, Th labs     FEN/GI  #Diet  - Minced moist and thin liquids (dentures)  - consistent carb 60g     Ppx  #GI:  #DVT:     Disposition  Code status: DNR/DNI  ELOS: 12 days  Rehab Prognosis: good     #Follow Up  - Primary care provider, Jessika Cordoba in 1-2 weeks from discharge  - Stroke neurology in 6-8 weeks from 12/27/23  - cardiology in 1 month from 12/27/23  - CXR in ~ 1/27/24       Follow Up Appointments  PCP, neurosurg, endocrine      Patient was evaluated  on day of discharge by attending physician, Charlie Paez DO, who agrees with plan of care.    Discharge summary was forwarded to Jessika Cordoba (PCP) at the time of discharge, so as to bridge from hospital to outpatient care.     It was our pleasure to care for Tc Garcia during this hospitalization. Please do not hesitate to contact me should there be questions regarding the hospital course or discharge plan.      Nadeen Curiel  Physical Medicine and Rehabilitation  PGY-2           Physician Attestation   I, Charlie Paez DO, was present with the resident physician who participated in the service and in the documentation of the note.  I have verified the history and personally performed the physical exam and medical decision making.  I agree with the assessment and plan of care as documented in the note.      Key findings:     Please see A&P for additional details of medical decision making.    I have personally reviewed the following data over the past 24 hrs:    11.6 (H)  \   10.4 (L)   / 260     139 101 21.4 /  347 (H)   3.6 25 1.56 (H) \     ALT: <5 AST: 9 AP: 70 TBILI: 0.4   ALB: 3.4 (L) TOT PROTEIN: 5.2 (L) LIPASE: N/A         Charlie Paez DO  Date of Service (when I saw the patient): 12/28/23

## 2023-12-28 NOTE — INTERIM SUMMARY
[facility] Pre-Admission Screen     Referral Source:  [facility] ED  Admit date to referring facility: 12/28/2023     Physical Medicine and Rehab Consult Completed: No     Rehab Diagnosis:    Brain Dysfunction 02.22 Traumatic, Closed Injury; Fall with resulting intracranial hemorrhage     Justification for Acute Inpatient Rehabilitation  Tc Garcia is a 83 year old male with PMH significant for diabetes mellitus type 2 (on insulin pump, h/o DKA), paroxysmal atrial fibrillation (on Eliquis), hypertension, hyperlipidemia, pulmonary hypertension, h/o spontaneous intracranial hemorrhage, recurrent pleural effusion, chronic kidney disease stage 3, BPH and anemia of chronic disease who presented to the ER with dizziness and fall with head injury. He was found to have intracranial hemorrhage. MRI brain with unchanged left cerebellar IPH with intraventricular extension, small volume subarachnoid hemorrhage involving the bilateral cerebral sulci, 0.1 x 0.3 cm area of diffusion restriction along medial left frontal lobe which may represent acute infarct, punctate foci of chronic microhemorrhage in bilateral cerebellar hemispheres. Hospital course has been complicated by dysphagia, BP control, hypo/hyperglycemia with DKA requiring insulin drip, nausea and emesis requiring IV zofran, ABBIE and infection/UTI. He transferred to [facility] on 12/27/23, however, was readmitted to the hospital on 12/28 d/t hyperglycemia and ketosis which occurred in the setting of transitioning the pt from subcutaneous insulin to insulin pump, where the pump malfunctioned. Endocrinology has initiated pt on insulin infusion w/ plan to transition back to usual pump settings within 24 hours. He has now been deemed stable for return to inpatient rehab.      Tc is very independent with ADL and mobility and is active around his home at baseline. He manages his own medication, drives, shops,  cleans and cares for 2 pets at home (dog and cat). He lives with his wife in a home with a couple steps to enter. Currently, he is below baseline, requiring assist of 1-2 with ADL and mobility. Additionally, he is below baseline for swallowing, and is requiring pureed foods. Therapies need to monitor his BP during activity. Patient requires an intensive inpatient rehab program with PT, OT, and SLP to address the following acute impairments:dysphagia, impaired activity tolerance, impaired balance, impaired coordination, impaired strength, and impaired weight shifting. Cognition should also be assessed in the rehab setting. These deficits are impacting his safety and independence w/ bed mobility, transfers, gaits, stairs, ADLs, IADLs, as well as his ability to effectively eat. The patient requires transfer to [facility] for intensive therapies not available in a lesser level of care including PT/OT/SLP, ongoing medical management at least 3 days per week, and rehabilitative nursing care.     Current Active Medical Management Needs/Risks for Clinical Complications  The patient requires the high level of rehabilitation physician supervision that accompanies the provision of intensive rehabilitation therapy.  The patient needs the services of the rehabilitation physician to assess the patient medically and functionally and to modify the course of treatment as needed to maximize the patient's capacity to benefit from the rehabilitation process.   The patient requires physician involvement at least 3 times per week to manage the following:  Neuro status: Pt is at risk of neurologic decompensation and AMS. Continue neuro status checks and assess for neurologic recovery. Use of delirium precautions will also assist w/ mgmt of AMS. Promote sleep hygiene; melatonin in use at bedtime as pt at risk for sleep disturbance in setting of ICH.   Blood pressure: Pt is at risk of uncontrolled blood pressure and further neurologic insult with  hypertension. SBP goal is 130-150, which requires close monitoring and titration of BP medications to maintain. Pt currently taking Norvasc, Coreg, Avapro.  Cardiac/anticoagulation:Pt is at risk for arrhythmias and adverse cardiovascular events. He has a history of A-fib and on Eliquis PTA. AC was reversed with Kcentra in the ED, and AC is on hold. He will need to follow up with cardiology in 1 month to determine when AC will restart and discuss potential Watchman Device.   Insulin dependent type I diabetes: pt is at risk for ongoing uncontrolled blood glucose with hypoglycemia with adverse events and hyperglycemia with poor healing. Hgb A1c 8.1% on 12/12/23. Patient has ambulatory insulin pump, used briefly in the hospital but went into DKA 12/24 so this was discontinued. Will need ongoing check of metered glucose 4 times daily before meals and at bedtime.  Endocrinology recommends Aspart 1 unit per 7 grams carbs w/ meals and PRN snacks. Glargine increased to 12 units every morning. Plan to step the timing back in preparation for pump restart on Tuesday. Needs moderate carbohydrate diet with no sugar sweetened beverages for us to achieve even reasonable blood sugar control. Continue to monitor closely and educate on diabetic diet. He will need to be followed closely by Endocrine while on Acute Rehab.   Dysphagia: Pt is at risk of aspiration pneumonia. Continue interventions with SLP for safe swallowing with Level 5 minced and moist diet and thin liquids, which is the highest level he can achieve given lack of dentures. Monitor for any signs of aspiration and for respiratory decompensation.  Nausea/vomitting: pt has required IV anti-emetics. Continue to assess GI status and for need for oral anti-emetics.   Acute Kidney Injury: Pt is at risk of further kidney injury, fluid and electrolyte imbalance. Cr ~1.0 at baseline and it was 1.43 on 12/29/23. Continue Irbesartan. Avoid nephrotoxins.  Continue daily BMP and  monitor fluid balance and electrolytes.   Infection: Pt has a UTI due to Klebsiella pneumoniae; IV Ceftriaxone discontinued 12/17.  Pt is at risk of recurrent infection. Also noted possible aspiration pneumonia; Chest x-ray right greater than left lower lung opacity suspicious for aspiration/infection. Follow-up imaging of chest in 6 weeks for resolution. Aggressive incentive spirometry and strict aspiration precautions. Assess to see that infection symptoms have cleared. Follow WBC trend - pt w/ elevated lactate on 12/24 - started on IV ceftriaxone x 7 day course.   DVT prevention: Pt is subcutaneous Lovenox due to risk of DVT. Monitor for signs of DVT.   High falls risk: Due to mobility and balance impairment. Guard against falls at all times. Educate pt and family on falls prevention strategies.      Past Medical/Surgical History  Surgery in the past 100 days: No  Additional relevant past medical history: Diabetes Mellitus type II, Compression fx deformities of C7, CKD stage III, HTN, anemia     Level of Functioning Prior to Admission:  LIVING ENVIRONMENT  People in Home: spouse  Current Living Arrangements: house  Home Accessibility: not wheelchair accessible, stairs to enter home, stairs within home, other (see comments)  Number of Stairs, Main Entrance: 3  Stair Railings, Main Entrance: railings safe and in good condition  Number of Stairs, Within Home, Primary: greater than 10 stairs  Stair Railings, Within Home, Primary: chair lift  Transportation Anticipated: family or friend will provide  Living Environment Comments: Pt lives with wife in house, has walk in shower/tub w shower chair, has chair lift for stairs in home, no AD use at baseline but owns walker     SELF-CARE  Usual Activity Tolerance: good  Regular Exercise: Yes  Activity/Exercise Type: walking  Exercise Amount/Frequency: daily (.5 miles, likes to walk the dog)  Equipment Currently Used at Home: shower chair  Activity/Exercise/Self-Care Comment:  Pt previously Ind w all ADLs at baseline, does not use walker at baseline     Level of Function: GG Scale (Section GG Functional Ability and Goals; CMS's SIU Version 3.0 Manual effective 10.1.2019):  PT Current Function Goals for Rehab   Bed Rolling 4 Supervision or touching assitance 6 Independent   Supine to Sit 4 Supervision or touching assitance 6 Independent   Sit to Stand 3 Partial/moderate assistance  6 Independent   Transfer 3 Partial/moderate assistance 6 Independent   Ambulation 3 Partial/moderate assistance  6 Independent   Stairs 88 Not attempted due to safety 4 Supervision or touching assitance      OT Current Function Goals for Rehab   Feeding 4 Supervision or touching assitance 6 Independent   Grooming 4 Supervision or touching assitance 6 Independent   Bathing Not completed 4 Supervision or touching assitance   Upper Body Dressing Not completed 6 Independent   Lower Body Dressing 3 Partial/moderate assistance 6 Independent   Toileting 4 Supervision or touching assitance 6 Independent   Toilet Transfer 3 Partial/moderate assistance 6 Independent   Tub/Shower Transfer 88 Not attempted due to safety 4 Supervision or touching assitance   Cognition Not Assessed Supervision      SLP Current Function Goals for Rehab   Swallow Not Impaired Not applicable   Communication Not Impaired Not applicable         Current Diet:  0-Thin and 5-Minced and moist - highest he can progress due to lack of dentures     Summary Statement:  Pt working with OT and PT and is making progress. In SLP, patient has been discharged as he has progressed to minced and moist and thin liquids. Patient unable to progress beyond this due to no dentures here. Pt needs up to Min A for sit<>stand transfers from chairs, and requires verbal and tactile cues for weight shifting to achieve static standing balance, as he tends to lean anteriorly or posteriorly.  Patient ambulates 50' with min-mod Ax1 pending fatigue. With increased fatigue pt  loses control of walker and ambulates more quickly leading to increased risk of falls. In OT, pt able to stand at sink to complete grooming tasks with CGA.      Expected Therapies and Services Required During Inpatient Rehab Admission  Intensity of Therapy: Patient requires intensive therapies not available in a lesser level of care. Patient is motivated, making gains, and can tolerate 3 hours of therapy a day.  Physical Therapy: 90 minutes per day, 6 days a week for 12 days  Occupational Therapy: 90 minutes per day, 6 days a week for 12 days  Speech and Language Therapy: Not indicated at this time.   Rehabilitation Nursing Needs: Patient requires 24 hour Rehab Nursing to manage vitals, medication education, carryover of new rehab techniques, care coordination, blood sugar management, diabetes education, assess neurologic status, provide safe environment for patient at falls risk, and safe swallowing .     Precautions/restrictions/special needs:  Precautions: fall precautions and aim to keep -180  Restrictions: NA  Special Needs:  [place]     Expected Level of Improvement: Anticipate pt to achieve Mod (I) with ADL and mobility around the home with the exception of SBA with bathing/tub transfers and on stairs due to fall risk.   Expected Length of time to achieve: 12 days     Anticipated Discharge Needs:  Anticipated Discharge Destination: Home  Anticipated Discharge Support:  Wife and daughter  24/7 support available : Yes  Identified caregiver(s):  Wife and daughter  Anticipated Discharge Needs: Home with homecare and Home with outpatient therapy     Identified challenges/barriers: none identified     Liaison signature/date/time:     Physician statement of review and agreement:  I have reviewed and am in agreement of the need for IRF stay to address above functional and medical needs. In addition to above statements address, Patient requires intensive active and ongoing therapeutic intervention and multiple  therapies; Patient requires medical supervision; Expected to actively participate in the intensive rehab program; Sufficiently stable to actively participate; Expectation for measurable improvement in functional capacity or adaption to impairments.     MD signature/date/time:

## 2023-12-28 NOTE — CONSULTS
[facility] INPATIENT DIABETES MANAGEMENT CONSULT  Tc Garcia  Age: 83 year old  MRN # [id number]   YOB: 1940    Chief Complaint: spontaneous intracranial hemorrhage who sustained a left intraparenchymal hematoma after a fall on 12/11/23.   Reason for Consult: chronic DM2, history of DKA with recent DKA event, would like to transition back to insulin pump while in ARU   Consulting Provider: Senthil Red MD     History of Present Illness:   Tc Garcia is a 83 year old right hand dominant male with a relevant PMH of DM1 on insulin pump (history of DKA), paroxysmal atrial fibrillation on eliquis, HTN, HLD, CKD stage 3, recurrent pleural effusion, and history of spontaneous intracranial hemorrhage who sustained a left intraparenchymal hematoma after a fall on 12/11/23.  He was sitting at the table and became dizzy, sustaining a fall with a head injury. He thinks the injury was mild and denies loss of consciousness.  Head CT showed a 1.5x3cm left inferior cerebellar hematoma.  CT also noted a 0.1x0.3cm area of left medial frontal lobe diffusion restriction.  Afterward, he developed left sided weakness, lower extremity more than upper extremity, as well as increased blurriness of vision.  He denies any sensory changes, cognitive changes, difficulties with speech or swallow, or changes in hearing.  He also denies bowel or bladder changes, being volitional in both.  Hospital course was significant for dysphagia, hyper and hypotension, labile glusose and DKA, nausea, ABBIE, and UTI.     During his acute hospitalization, the patient was seen and evaluated by PT, OT, SLP and PM&R consult service.  All specialties collectively recommended that patient would benefit from ongoing therapies in the acute inpatient rehabilitation setting.     Pt is not known to the Inpatient Diabetes Service from past admission(s).    History obtained via the patient, chart review, and discussion with consulting team.    Diabetes  focused: Tc Garcia has long-standing T2DM with CKD, stroke  complications; Last clinic appointment was with Dr. Asif on 10/25/23 Unable to obtain records as the office closes at 4 pm, called at 4:15 pm and it was closed.  Chanel Asif Md [address], [place] [address] [phone]  The patient notes that he is typically well controlled with his sugars, watching him very closely on his lifestyle lan.  He has an insulin pump which seems to control his sugars well,      BG running at home : at night around 150 ran to 78-98 into the morning.      Usual diet at home: Breakfast regular coffee, waffles, cereal.  Lunch Soup and sandwich pop Dinner Random, Snacks: popcorn and rice crackers. Drinks pop       Monitors BG at home with Lan     Historical diabetes medications:  Metformin (500mg BID) and Actos (30mg daily) discontinued in 2020.    INSULIN PUMP -  Medtronic minimed  Sensitivity factor (midnight to midnight): 60  Bolus (units:gram): 5414-7099 = 1:9, 1042-7089 = 1:7, 6762-5056 = 1:7, 8995-6144 = 1:9, 1819-1735 = 1:13   Basal Rates and Start/End times:   Rate #1 =  0.45 units/hr from 0000 to 0200.   Rate #2 = 0.45 units/hr from 0201 to 0600.   Rate #3 = 0.625 units/hr from 0601 to 0900.   Rate #4 = 0.625 units/hr from 0901 to  1700   Rate #5 = 0.50 units/hr from 1701 to 2359.     Maintain blood glucose level within a specified target range  0930-7149 110-150  5919-9115 100-120  8810-2909 100-140    Active insulin time 2 hours     Per patient , his endocrinologist sets the pump settings for him and he does not change the settings. Current settings are what his endocrinologist had with the visit in October.      Ability to [place] Prescribed Regimen: Yes. Patient is able to show me how the pump works and was able to set it up by himself.   Diabetes Control:      Hemoglobin A1C   Date Value Ref Range Status   12/12/2023 8.1 (H) <5.7 % Final     Comment:     Normal <5.7%   Prediabetes 5.7-6.4%     Diabetes 6.5% or higher     Note: Adopted from ADA consensus guidelines.   11/20/2020 7.7 (H) 0 - 5.6 % Final     Comment:     Normal <5.7% Prediabetes 5.7-6.4%  Diabetes 6.5% or higher - adopted from ADA   consensus guidelines.          Diabetes Type: Initally diagnosed as a type 2 now, Type 1, reports 1 month ago, He was tested and was found to be a type 1   Diabetes Duration: 20 years      History of DKA: 12/24/23, 4/20/23, 5/2020  Able to Detect Hypoglycemia: Can't tell when BG is low, used to be able to tell by when he was sweaty.     Lucho alarms.  BG currently: 221  Current inpatient diabetes medication regimen:    - lantus glargine 9 units subq bedtime  - lispro 1 unit per 7 g carb breakfast  - lispro 1 unit per 7 g carb lunch  - lispro 1 unit per 10 g carb dinner  - med sliding scale insulin    Current diet: mo cho Minced moist diet with thin liquids.  Steroids given/ordered none   Cr:1.62  GFR  42     Total daily insulin  Subcutaneous insulin Lantus 9 units, Novolog 11 units 12/26 from 8 am to 8 am on 12/27     Review of Systems  10 point ROS completed with pertinent positives and negatives noted in the HPI - Denies pain, swelling or lumps in groin.       Past medical, family and social histories are reviewed and updated.      Social History  Social History     Socioeconomic History    Marital status:      Spouse name: Lianna    Number of children: 4    Years of education: 16   Occupational History    Occupation: [employer]     Employer: [employer]    Tobacco Use    Smoking status: Former     Packs/day: 0.20     Years: 40.00     Additional pack years: 0.00     Total pack years: 8.00     Types: Cigarettes     Start date: 1968     Quit date: 1/1/2008     Years since quitting: 15.9    Smokeless tobacco: Never   Vaping Use    Vaping Use: Never used   Substance and Sexual Activity    Alcohol use: Not Currently     Alcohol/week: 35.0 standard drinks of alcohol    Drug use: Not Currently  "    Social Determinants of Health     Financial Resource Strain: Low Risk  (10/16/2023)    Financial Resource Strain     Within the past 12 months, have you or your family members you live with been unable to get utilities (heat, electricity) when it was really needed?: No   Food Insecurity: Low Risk  (10/16/2023)    Food Insecurity     Within the past 12 months, did you worry that your food would run out before you got money to buy more?: No     Within the past 12 months, did the food you bought just not last and you didn t have money to get more?: No   Transportation Needs: Low Risk  (10/16/2023)    Transportation Needs     Within the past 12 months, has lack of transportation kept you from medical appointments, getting your medicines, non-medical meetings or appointments, work, or from getting things that you need?: No   Housing Stability: Low Risk  (10/16/2023)    Housing Stability     Do you have housing? : Yes     Are you worried about losing your housing?: No        Tobacco: yes 20-30 years ago    Alcohol:30 years ago     Marital Status: yes     Place of Residence: [place]  [facility]     Occupation: Retired      Family History  FHx diabetes, sisters     Physical Exam   /54 (BP Location: Left arm)   Pulse 50   Temp 97.6  F (36.4  C) (Oral)   Resp 16   Ht 1.753 m (5' 9\")   Wt 62.6 kg (138 lb)   SpO2 100%   BMI 20.38 kg/m    Constitutional: pleasant, in no distress.  HEENT: normocephalic,   Lungs: unlabored respiration, no cough  ABD: rounded, nondistended  Skin: warm and dry, no obvious lesions  MSK:  moves all extremities  Lymp:  no LE edema   Mental status:  alert, oriented to self, place, time, answering all questions appropriately   Psych:   calm and appropriate interaction     Most Recent Laboratory Tests:  Recent Labs   Lab 12/26/23  1636   HGB 9.9*     No results for input(s): \"A1C\" in the last 168 hours.  Recent Labs   Lab 12/27/23  0643   CR 1.62*  1.62*     Recent Labs "   Lab 12/27/23  1149 12/27/23  0747 12/27/23  0643 12/27/23  0148 12/27/23  0016 12/26/23  2101   * 163* 156* 195* 186* 209*          Assessment  # Type 1 diabetes   # DKA 12/24 - now resolved     Plan:  -  - Patient has T1DM - requires insulin, basal dose must not to be withheld entirely OR for prolonged periods, high risk for DKA**  - Discontinue subcutaneous Lantus, Correction scale and carb coverage  -Start ambulattory insulin pump Settings verified.      INSULIN PUMP -  Medtronic minimed  Sensitivity factor (midnight to midnight): 60  Bolus (units:gram): 1392-7491 = 1:9, 1694-2200 = 1:7, 0520-3901 = 1:7, 4722-5443 = 1:9, 8820-7993 = 1:13   Basal Rates and Start/End times:   Rate #1 =  0.45 units/hr from 0000 to 0200.   Rate #2 = 0.45 units/hr from 0201 to 0600.   Rate #3 = 0.625 units/hr from 0601 to 0900.   Rate #4 = 0.625 units/hr from 0901 to  1700   Rate #5 = 0.50 units/hr from 1701 to 2359.     Maintain blood glucose level within a specified target range  3543-5949 110-150  6160-5932 100-120  7198-5333 100-140    Active insulin time 2 hours      -BG monitoring TID AC, HS, 0200 and 0500              - Ok to wear Dexcom/Lucho - nursing must continue to do POC BG monitoring as ordered.  CGM not to be used for decision-making - per hospital policy.       -hypoglycemia protocol   -diabetes education needs will be assessed closer to discharge   -on discharge, will recommend outpatient follow up with  Chanel Asif MD.              - Primary team is first call for any BG issues overnight.     Discussed plan of care with patient, nursing, and primary team   Thank you for this consult; Inpatient Diabetes will continue to follow.         Contacting the Inpatient Diabetes Team   From 7AM-5PM: page inpatient diabetes provider that is following the patient, or utilize the job code paging system.   From 5PM-7AM: page the job code for endocrine fellow on call.     Please use the following job code to reach  the Inpatient Diabetes team. Note that you must use an in house phone and that job codes cannot receive text pages.   Dial [phone] (or star-star-star [phone] on the new [employer] telephones), then [phone] to reach the endocrine-diabetes provider on call.    I spent a total of 100 minutes on the date of the encounter doing prep/post-work, chart review, history and exam, documentation and further activities per the note including lab review, multidisciplinary communication, counseling the patient and/or coordinating care regarding acute hyper/hypoglycemic management, as well as discharge management and planning/communication.  See note for details             Over 50% of my time on the unit was spent counseling the patient and/or coordinating care regarding acute hyperglycemia management.  See note for details.    Annette Rodriguez, TANGELA-BC, NP  Inpatient Diabetes Management Service  Pager - [phone]  Available on [employer]

## 2023-12-28 NOTE — PLAN OF CARE
Care from [id number]    Assumed care for patient and received report regarding elevated BG and ketone levels overnight. Insulin pump was disconnected by previous nurse. Discussed POC with on call PM&R. Patient to discharge to  ED for further management. Prior to discharging patient, instructed by PM&R to follow orders in MAR for scheduled Novolog and Lantus. Patient was then discharged and report given to receiving nurse in ED. Patient's belongings are with patient.

## 2023-12-28 NOTE — PLAN OF CARE
Goal Outcome Evaluation:         Patient here post Stroke, alert and oriented with some forgetfulness, able to make needs known, calls appropriately  Slept most of the night, awaken in between toilet needs  No complained of pain, headache, chest pain, N&V, no SOB  Continent of Bladder, ambulates with walker to bathroom, voiding well, no BM  Updated on call doctor regarding patient BG level @ 2am and 5am 322 and 374, pt independent in managing his insulin pump, no new order.   Safety rounding checked completed, 3 side rails UP, bed alarm ON, call light/bedside table within reach  Plan of care ongoing

## 2023-12-28 NOTE — PROGRESS NOTES
IP Diabetes Management  Daily Note         HPI: Tc Garcia is a 83 year old right hand dominant male with a relevant PMH of DM2 on insulin pump (history of DKA), paroxysmal atrial fibrillation on eliquis, HTN, HLD, CKD stage 3, recurrent pleural effusion, and history of spontaneous intracranial hemorrhage who sustained a left intraparenchymal hematoma after a fall on 12/11/23.  He was sitting at the table and became dizzy, sustaining a fall with a head injury. He thinks the injury was mild and denies loss of consciousness.  Head CT showed a 1.5x3cm left inferior cerebellar hematoma.  CT also noted a 0.1x0.3cm area of left medial frontal lobe diffusion restriction.  Afterward, he developed left sided weakness, lower extremity more than upper extremity, as well as increased blurriness of vision.      Assessment:   1)Type 1 Diabetes Mellitus,  controlled (A1c 8.1), with CKD, with recent DKA.     Plan:    -  Continue on IV insulin; DKA protocol.  Once metabolic derangements resolved, switch to NON-DKA IV insulin protocol.  When appropriate, PO to advance and dextrose fluids to reduce then be discontinued.  Once off dextrose fluids and tolerating PO, will be appropriate for transition to MDI      -  ** patient has T1DM - requires insulin, basal dose must not to be withheld entirely OR for prolonged periods, high risk for DKA**  If trending >250 mg/dL longer than 4 hours, please call IDS/Endo.  If patient requires intervention or adjustments for hypoglycemia or prolonged NPO, please contact our service.      -  Once eating, Novolog meal coverage   - Novolog 1 unit per 7 g carb breakfast  - Novolog1 unit per 7 g carb lunch  - Novolog1 unit per 10 g carb dinner/snacks  - med sliding scale insulin with meals/HS and 0200   - lantus glargine 9 units subq at 0700    -  BG monitoring q1-2 hr per IV insulin drip protocol switch to BG checks with meals/HS and 0200     -  PTA meds - N/A not currently on pump o    -  Hypoglycemia  protocol    -  Recommend carb counting protocol    -  Education :  TBD - can see outpatient educator     -  Outpatient follow up:  recommend she make her appt with primary Endo Dr. Sheldon    -  Please do not hesitate to contact the team with any questions/concerns.     -  Please notify inpatient diabetes service if changes are planned that will impact glycemia, such as NPO, starting/adjusting steroids, enteral feeding, parenteral feeding, dextrose fluids or procedures.     Interval History and Assessment: interval glucose trend reviewed: BGS running overnight elevated in the 300's  Patient reports initially placing an infusion site and not hearing the usual click. He then inserted a second infusion site and again did not hear a usual click. He thinks this may have been the issue. In review or his pump information overnight. He gave himselff multiple boluses. BG at midnight was reading 453 and he tried to give 5 units. BG at 1 am was 514 and he tried to give himself 2 units and then tried 10 units 110 minutes later. His BG at 0233 was 600 and he tried to bolus 5 units again and at 5:30 this morning his blood sugar was reading 600 and he tried to bolus 7.5 units. I was able to get in touch with Dr. Sheldon's office, patient's primary endocrinologist and reviewed  patient's type of diabetes and pump settings. Verified with his clinic the diagnosis of type 1 Cpeptid on August 11, 2023 was <0.1. Current pump settings are the same as his last clinic appointment with Dr. Sheldon in October, 2023. Will need close review with the patient to see if he will be able to discharge with the pump. He has had admissions this year from DKA.     Recent Labs   Lab 12/28/23  1249 12/28/23  1143 12/28/23  0848 12/28/23  0805 12/28/23  0727 12/28/23  0651   * 195* 347*  347* 345* 371* 400*          Labs:  Bicarb:13  Creatinine: 1.56  eGFR: 44  Anion Gap: 13    Current nutritional intake and type: None      TPN/TF none  Planned  Procedures/surgeries: none  Steroid planning: none  D5W-containing solutions/medications: none    PTA Diabetes Regimen:     INSULIN PUMP -  Medtronic minimed  Sensitivity factor (midnight to midnight): 60  Bolus (units:gram): 1082-4622 = 1:9, 3707-1373 = 1:7, 8272-0397 = 1:7, 7179-1330 = 1:9, 3700-8279 = 1:13   Basal Rates and Start/End times:   Rate #1 =  0.45 units/hr from 0000 to 0200.   Rate #2 = 0.45 units/hr from 0201 to 0600.   Rate #3 = 0.625 units/hr from 0601 to 0900.   Rate #4 = 0.625 units/hr from 0901 to  1700   Rate #5 = 0.50 units/hr from 1701 to 2359.      Maintain blood glucose level within a specified target range  1411-0717 110-150  8300-4569 100-120  1723-0306 100-140    Active insulin time 4 hours            Diabetes History:   Type of Diabetes: Type 1 Diabetes Mellitus  Lab Results   Component Value Date    A1C 8.1 12/12/2023    A1C 8.0 04/20/2023    A1C 7.2 01/17/2023    A1C 7.7 11/20/2020    A1C 7.4 07/08/2020    A1C 8.2 04/25/2020    A1C 7.7 10/31/2019    A1C 7.2 06/30/2009              Review of Systems:     The Review of Systems is negative other than noted in the Interval History.           Medications:     Current Facility-Administered Medications   Medication    glucose gel 15-30 g    Or    dextrose 50 % injection 25-50 mL    Or    glucagon injection 1 mg    insulin aspart (NovoLOG) injection (RAPID ACTING)    insulin regular (MYXREDLIN) 1 unit/mL infusion    sodium chloride 0.9% BOLUS 1,000 mL     Current Outpatient Medications   Medication Sig    acetaminophen (TYLENOL) 325 MG tablet Take 2 tablets (650 mg) by mouth every 6 hours as needed for mild pain or fever    amLODIPine (NORVASC) 10 MG tablet Take 1 tablet (10 mg) by mouth daily    carvedilol (COREG) 12.5 MG tablet Take 1 tablet (12.5 mg) by mouth 2 times daily (with meals)    cefTRIAXone (ROCEPHIN) 1 GM vial Inject 1 g into the vein every 24 hours    Continuous Blood Gluc  (DEXCOM G6 ) JOSE Dexcom G6     USE EACH WITH 10 DAYS SENSORS    enoxaparin ANTICOAGULANT (LOVENOX) 30 MG/0.3ML syringe Inject 0.3 mLs (30 mg) Subcutaneous every 24 hours    glucose 40 % (400 mg/mL) gel Take 15-30 g by mouth every 15 minutes as needed for low blood sugar    insulin glargine (LANTUS PEN) 100 UNIT/ML pen Inject 9 Units Subcutaneous every 24 hours    insulin lispro (HUMALOG) 100 UNIT/ML Cartridge Inject 1 Units Subcutaneous 3 times daily (before meals)    insulin lispro (HUMALOG) 100 UNIT/ML Cartridge Inject 1-4 Units Subcutaneous 2 times daily (before meals)    insulin lispro (HUMALOG) 100 UNIT/ML Cartridge Inject 1-7 Units Subcutaneous 3 times daily (before meals)    insulin lispro (HUMALOG) 100 UNIT/ML Cartridge Inject 1-5 Units Subcutaneous 2 times daily (before meals)    insulin lispro (HUMALOG) 100 UNIT/ML vial 1 units per 7 grams of carbohydrate with breakfast    insulin lispro (HUMALOG) 100 UNIT/ML vial 1 units per 10 grams of carbohydrate with supper    INSULIN PUMP - OUTPATIENT Medtronic device with no CGM and site change every 3 days   Sensitivity factor (midnight to midnight): 55  Bolus (units:gram): 3080-5327 = 1:9, 0884-2923 = 1:7, 5836-5986 = 1:7, 0170-4404 = 1:9, 1720-6741 = 1:13  Basal (units/hour): 2875-3963 = 0.45, 0517-4505 = 0.5, 3563-0054 = 0.625, 4109-5553 = 0.6, 1125-5058 = 0.45    irbesartan (AVAPRO) 300 MG tablet Take 1 tablet (300 mg) by mouth At Bedtime    isosorbide mononitrate (IMDUR) 60 MG 24 hr tablet Take 1 tablet (60 mg) by mouth every evening    melatonin 10 MG TABS tablet Take 1 tablet (10 mg) by mouth nightly as needed for sleep    melatonin 10 MG TABS tablet Take 1 tablet (10 mg) by mouth at bedtime    senna-docusate (SENOKOT-S/PERICOLACE) 8.6-50 MG tablet Take 1-2 tablets by mouth 2 times daily as needed for constipation    sodium chloride, PF, 0.9% PF flush 3 mLs by Intracatheter route every 8 hours    tamsulosin (FLOMAX) 0.4 MG capsule Take 0.4 mg by mouth every morning    torsemide (DEMADEX)  10 MG tablet Take 1 tablet (10 mg) by mouth as needed (For weight gain greater than 3 lbs overnight or increased swelling and/or shortness of breath)    traMADol (ULTRAM-ER) 100 MG 24 hr tablet Take 1 tablet (100 mg) by mouth daily as needed for moderate to severe pain            Physical Exam:    BP (!) 149/63   Pulse 71   Temp 97.3  F (36.3  C) (Oral)   Resp 16   Constitutional: pleasant, in no distress.  HEENT: normocephalic,   Lungs: unlabored respiration, no cough  ABD: rounded, nondistended  Skin: warm and dry, no obvious lesions  MSK:  moves all extremities  Lymp:  no LE edema   Mental status:  alert, oriented to self, place, time, answering all questions appropriately   Psych:   calm and appropriate interaction             Data:     Recent Labs   Lab 12/28/23  0848 12/28/23  0805 12/28/23  0727 12/28/23  0651 12/28/23  0600 12/28/23  0530   * 345* 371* 400* 404* 374*     Lab Results   Component Value Date    WBC 11.6 (H) 12/28/2023    WBC 11.0 12/28/2023    WBC 11.8 (H) 12/26/2023    HGB 10.4 (L) 12/28/2023    HGB 10.5 (L) 12/28/2023    HGB 9.9 (L) 12/26/2023    HCT 32.8 (L) 12/28/2023    HCT 33.1 (L) 12/28/2023    HCT 32.0 (L) 12/26/2023    MCV 86 12/28/2023    MCV 87 12/28/2023    MCV 86 12/26/2023     12/28/2023     12/28/2023     12/27/2023     Lab Results   Component Value Date     12/28/2023     12/28/2023     12/27/2023    POTASSIUM 3.6 12/28/2023    POTASSIUM 4.2 12/28/2023    POTASSIUM 4.2 12/27/2023    POTASSIUM 4.2 12/27/2023    CHLORIDE 101 12/28/2023    CHLORIDE 97 (L) 12/28/2023    CHLORIDE 103 12/27/2023    CO2 25 12/28/2023    CO2 25 12/28/2023    CO2 30 (H) 12/27/2023     (H) 12/28/2023     (H) 12/28/2023     (H) 12/28/2023     Lab Results   Component Value Date    BUN 21.4 12/28/2023    BUN 20.4 12/28/2023    BUN 14.8 12/27/2023     Lab Results   Component Value Date    TSH 3.11 01/15/2023    TSH 0.94 03/17/2021    TSH  4.75 (H) 08/10/2020     Lab Results   Component Value Date    AST 9 12/28/2023    AST 9 12/28/2023    AST 10 12/24/2023    ALT <5 12/28/2023    ALT 5 12/28/2023    ALT 7 12/24/2023    GGT 34 11/07/2019    ALKPHOS 70 12/28/2023    ALKPHOS 74 12/28/2023    ALKPHOS 73 12/24/2023       I personally spent 60 minutes on the date of the encounter doing chart review, history and exam, documentation and further activities per the note.             Over 50% of my time on the unit was spent counseling the patient and/or coordinating care regarding acute hyperglycemia management.  See note for details.      Contacting the Inpatient Diabetes Team   From 7AM-5PM: page inpatient diabetes provider that is following the patient, or utilize the job code paging system. From 5PM-7AM: page the job code for endocrine fellow on call.     Please use the following job code to reach the Inpatient Diabetes team. Note that you must use an in house phone and that job codes cannot receive text pages.   Dial [phone] (or star-star-star [phone] on the new [employer] telephones), then [phone] to reach the endocrine-diabetes provider on call.    TANGELA Leonard-BC, NP  Inpatient Diabetes Management Service  Pager - [phone]  Available on [employer]

## 2023-12-28 NOTE — H&P
[facility]    History and Physical - Hospitalist Service, GOLD TEAM 18       Date of Admission:  12/28/2023    Assessment & Plan      Tc Garcia is a 83 year old male admitted on 12/28/2023. He has a history of paroxysmal atrial fibrillation on Eliquis, pulmonary hypertension, recent hospitalization for intracranial hemorrhage from 12/11-12/27/2023 after a fall with head injury resulting in intraparenchymal hematoma.  His insulin pump was held given this intraparenchymal hematoma.  His insulin pump was restarted on 12/23/2023 but was found to be in DKA the following day.  His DKA did resolve by 12/25/2023.  He was transitioned to Lantus during his hospitalization, and then discharged to acute rehab unit just yesterday where they reinitiated his insulin pump, with evidence of raising blood sugars.  He is being admitted for management of his blood sugars   Individual problems and their management are outlined below       Hyperglycemia, Ketosis:  This has happened in the setting of transitioning patient from subcutaneous insulin to insulin pump, where the pump malfunctioned vs the needle of the pump was not embedded well into the skin to receive insulin   Ketones due to elevated blood sugar, but no acidosis noted  Patient has been initiated in insulin infusion by endocrinology team as per protocol  Expect that this will be transitioned back to usual pump settings in the next 24 hrs   Continue IV fluids - D5NS at 50 mls//hr     Intraparenchymal hematoma  .  H/o spontaneous intracranial hemorrhage (1/2023)  paroxysmal atrial fibrillation (on eliquis prior to admission)  Presented with dizziness diplopia and nausea to [facility] on 12/11  *HCT with 1.5 X 3 cm intraparenchymal hematoma centered in inferior cerebellar vermis with likely extension into fourth ventricle; no hydrocephalus  *CTA head and neck noted some atherosclerotic plaque with questionable active  "contrast extravasation into the hematoma  *CT cervical spine noted with compression fracture deformities C7 with slight further loss of height since comparison study and noted with mild degenerative changes  *MRI brain with unchanged left cerebellar intraparenchymal hematoma with intraventricular extension, small volume subarachnoid hemorrhage involving the bilateral cerebral sulci, 0.1 x 0.3 cm area of diffusion restriction along medial left frontal lobe which may represent acute infarct, punctate foci of chronic microhemorrhage in bilateral cerebellar hemispheres  Echocardiogram with EF of 60 to 65%.  Severe biatrial enlargement.  Mild to moderate TR.  --- Etiology for intraparenchymal hematoma likely hypertensive and in the setting of chronic anticoagulation with Eliquis versus traumatic after sudden onset of dizziness and fall.  Anticoagulation was reversed with Kcentra in ED  Admitted to ICU initially for close monitoring  Stroke neurology Followed: \"HOLD anticoagulation. Not an ASPIRE candidate given ICH within the past year.   Systolic BP Goal: 130-150.    Neurosurgery followed, \"Patient is DNR/DNI, does not wish any intervention from neurosurgery, particular surgical intervention or an EVD. Neurosurgery will sign off.\"  PT/OT consulted, wjo recommended  patient discharging to ARU   On subcutaneous Lovenox for DVT prophylaxis, stroke neurology okay.  With anticoagulation on hold, the plan is to have him follow up with cardiology in 1 month to discuss Watchman.   Follow-up with stroke neurology in 6-8 weeks        UTI due to Klebsiella pneumoniae  Possible aspiration pneumonia.  -- On 12/12 to 12/13 had agitation, confusion.  Had leukocytosis.  Blood cultures  no growth  UA with > 182 WBC/hpf, large leukocyte esterase.   Urine culture has greater than 100,000 CFU/mL Klebsiella pneumoniae  Chest x-ray right greater than left lower lung opacity suspicious for aspiration/infection.  - Was on IV ceftriaxone, " "subsequently switched to cefuroxime to complete course  - Subsequently, patient had a repeat code for elevated laccate on 12/24, during which he was restarted on IV antibiotics ( broad spectrum) and tapered to  IV ceftriaxone with discharge plans to continue IV abx for a total of 7 days. This period would end on 12/31           Chronic diastolic CHF  Atrial fibrillation PTA on Eliquis.  Hypertension.  Hyperlipidemia.  Prior to admission Eliquis on hold since admission.  Echocardiogram with EF of 60 to 65%.  Severe biatrial enlargement.  Mild to moderate TR.  Telemetry  with atrial fibrillation.  Continue PTA carvedilol, avoid rebound tachycardia  Continue Avapro   hold PTA Demadex, cardiology recently advised changing to as needed dosing  As needed labetalol and hydralazine available  Follow-up with cardiology after discharge, discuss watchman procedure      Chronic C7 compression fracture.  Cervical flexion-extension x-ray without any dynamic instability.  Per chart review neurosurgery 12/12 recommending likely healed since his initial imaging 4 years ago.  Given no dynamic instability on x-ray recommend no cervical collar needed.     CKD stage III  *Baseline creatinine around 1 to 1.1 in early 2023, more recently around 1.5  *Creatinine on admission 1.66  Continue Irbesartan  See comments in note from office visit with Lacie Tapia on 11/20/2023, \"Advised not to start Entresto given renal function, then due to further decline in renal function torsemide changed to PRN.\"  Creatinine stable                 Diet:  Regular adult   DVT Prophylaxis: Enoxaparin (Lovenox) SQ  Pruett Catheter: Not present  Lines: None     Cardiac Monitoring: None  Code Status:  DNR    Clinically Significant Risk Factors Present on Admission          # Hypocalcemia: Lowest Ca = 8.3 mg/dL in last 2 days, will monitor and replace as appropriate     # Hypoalbuminemia: Lowest albumin = 3.4 g/dL at 12/28/2023  8:48 AM, will monitor as " appropriate  # Drug Induced Coagulation Defect: home medication list includes an anticoagulant medication    # Hypertension: Noted on problem list  # Chronic heart failure with preserved ejection fraction: heart failure noted on problem list and last echo with EF >50%    # DMII: A1C = 8.1 % (Ref range: <5.7 %) within past 6 months       # Financial/Environmental Concerns:           Disposition Plan      Expected Discharge Date: 12/29/2023                  Mary Mars MD  Hospitalist Service, GOLD TEAM 18  [facility]  Securely message with [employer] (more info)  Text page via [facility] Paging/Directory   See signed in provider for up to date coverage information    ______________________________________________________________________    Chief Complaint   Elevated blood sugars     History is obtained from the patient and chart review.    History of Present Illness   Tc Garcia is a 83 year old male with history of paroxysmal atrial fibrillation on Eliquis, pulmonary hypertension, recent hospitalization for intracranial hemorrhage, diabetes on insulin, prior episode of DKA who presents from acute rehab unit  ( where he was admitted on 12/27 after [facility]) to the emergency department elevated blood sugars and concerns for insulin pump malfunction.  He states that his insulin pump had gotten disconnected and then was reattached last night.  His blood sugar at that time was in the 200s and so he was given a bolus of insulin.  This morning he found his blood sugar was again elevated, so given self more insulin.  He was not sure if the pump was malfunctioning or not and so presents for evaluation.  He notes that his insulin pump was reprogrammed about 3 months ago.    Epic records reviewed, patient had recent hospitalization from 12/11-12/27/2023 after a fall with head injury resulting in intraparenchymal hematoma.  His insulin pump was held  given this intraparenchymal hematoma.  His insulin pump was restarted on 12/23/2023 but was found to be in DKA the following day.  His DKA did resolve by 12/25/2023.  He was transitioned to Lantus during his hospitalization, and then discharged to acute rehab unit just yesterday where they reinitiated his insulin pump.     In talking to patient, he appeared to be very comfortable on his gurney.  He tells me that the reason why his blood sugar shot up was because the insulin needle was not connected to him.  He felt that it was a faulty application of the insulin pump.  He denies any chest pain or shortness of breath.  He denies any fevers or chills.  He denies any nausea, vomiting or diarrhea.    He is determined to get better and go back home.  He is upset that he if he does not get home in time, his wife may become seriously ill.  He is hoping to go back to ARU as early as today.  I did discuss with him that he has been put on an insulin infusion, which will eventually be transitioned to an insulin pump before he gets discharged to the acute rehab unit.  I did promise him that I will talk to the intake team at the acute rehab unit to make them aware of that you are getting ready to be admitted back to ARU.  From the stroke itself.  Patient denies any obvious neurological deficit.  He endorses global weakness but no specific weakness.    Past Medical History    Past Medical History:   Diagnosis Date    Anemia     Anemia of chronic disease 5/14/2020    Back pain     CKD (chronic kidney disease) stage 3, GFR 30-59 ml/min (H)     CRF (chronic renal failure), stage 3  5/14/2020    Fall 11/2019    suffered multiple left rib fractures, C3 and T2 fractures    Mixed hyperlipidemia 7/7/2004    Paroxysmal atrial fibrillation (H)     Personal history of colonic polyps 1972    gets colonoscopy every five years, due in 2006    Pleural effusion on left 11/2019    after multiple rib fractures    Pulmonary hypertension (H)  5/10/2020    Added automatically from request for surgery [id number]    Recurrent Left Pleural effusion -- S/P Pleurex Cath 5/12/20 12/30/2019    Rosacea 1989    Type II or unspecified type diabetes mellitus without mention of complication, not stated as uncontrolled 1999    Unspecified essential hypertension 1994       Past Surgical History   Past Surgical History:   Procedure Laterality Date    ANESTHESIA CARDIOVERSION N/A 2/3/2020    Procedure: ANESTHESIA, FOR CARDIOVERSION;  Surgeon: GENERIC ANESTHESIA PROVIDER;  Location:  OR    CV RIGHT HEART CATH MEASUREMENTS RECORDED N/A 5/13/2020    Procedure: Right Heart Cath;  Surgeon: Senthil Silva MD;  Location:  HEART CARDIAC CATH LAB    HC REMOVE TONSILS/ADENOIDS,<13 Y/O      T & A <11y.o.    IR CHEST TUBE DRAIN TUNNELED LEFT  5/12/2020    IR CHEST TUBE PLACEMENT NON-TUNNELLED LEFT  7/11/2020    IR CHEST TUBE REMOVAL NON TUNNELED LEFT  7/17/2020    IR CHEST TUBE REMOVAL TUNNELED LEFT  7/11/2020    LAPAROSCOPIC CHOLECYSTECTOMY N/A 11/22/2020    Procedure: CHOLECYSTECTOMY, LAPAROSCOPIC;  Surgeon: Annette Lambert MD;  Location:  OR    LAPAROSCOPIC HERNIORRHAPHY INGUINAL BILATERAL Bilateral 10/27/2020    Procedure: LAPAROSCOPIC BILATERAL INGUINAL HERNIA REPAIR WITH MESH;  Surgeon: Brian Garsia MD;  Location:  OR       Prior to Admission Medications   Prior to Admission Medications   Prescriptions Last Dose Informant Patient Reported? Taking?   Continuous Blood Gluc  (DEXCOM G6 ) JOSE   Yes No   Sig: Dexcom G6    USE EACH WITH 10 DAYS SENSORS   INSULIN PUMP - OUTPATIENT  Other Yes No   Sig: Medtronic device with no CGM and site change every 3 days   Sensitivity factor (midnight to midnight): 55  Bolus (units:gram): 4385-8374 = 1:9, 7952-2607 = 1:7, 6348-9176 = 1:7, 2737-3554 = 1:9, 0655-4063 = 1:13  Basal (units/hour): 9413-4204 = 0.45, 5698-3281 = 0.5, 1398-3754 = 0.625, 0116-0409 = 0.6, 4562-3776 = 0.45   acetaminophen  (TYLENOL) 325 MG tablet   No No   Sig: Take 2 tablets (650 mg) by mouth every 6 hours as needed for mild pain or fever   amLODIPine (NORVASC) 10 MG tablet   No No   Sig: Take 1 tablet (10 mg) by mouth daily   carvedilol (COREG) 12.5 MG tablet   No No   Sig: Take 1 tablet (12.5 mg) by mouth 2 times daily (with meals)   cefTRIAXone (ROCEPHIN) 1 GM vial   No No   Sig: Inject 1 g into the vein every 24 hours   enoxaparin ANTICOAGULANT (LOVENOX) 30 MG/0.3ML syringe   No No   Sig: Inject 0.3 mLs (30 mg) Subcutaneous every 24 hours   glucose 40 % (400 mg/mL) gel   No No   Sig: Take 15-30 g by mouth every 15 minutes as needed for low blood sugar   insulin glargine (LANTUS PEN) 100 UNIT/ML pen   No No   Sig: Inject 9 Units Subcutaneous every 24 hours   insulin lispro (HUMALOG) 100 UNIT/ML Cartridge   No No   Sig: Inject 1 Units Subcutaneous 3 times daily (before meals)   insulin lispro (HUMALOG) 100 UNIT/ML Cartridge   No No   Sig: Inject 1-4 Units Subcutaneous 2 times daily (before meals)   insulin lispro (HUMALOG) 100 UNIT/ML Cartridge   No No   Sig: Inject 1-7 Units Subcutaneous 3 times daily (before meals)   insulin lispro (HUMALOG) 100 UNIT/ML Cartridge   No No   Sig: Inject 1-5 Units Subcutaneous 2 times daily (before meals)   insulin lispro (HUMALOG) 100 UNIT/ML vial   No No   Si units per 7 grams of carbohydrate with breakfast   insulin lispro (HUMALOG) 100 UNIT/ML vial   No No   Si units per 10 grams of carbohydrate with supper   irbesartan (AVAPRO) 300 MG tablet   No No   Sig: Take 1 tablet (300 mg) by mouth At Bedtime   isosorbide mononitrate (IMDUR) 60 MG 24 hr tablet   No No   Sig: Take 1 tablet (60 mg) by mouth every evening   melatonin 10 MG TABS tablet   No No   Sig: Take 1 tablet (10 mg) by mouth at bedtime   melatonin 10 MG TABS tablet   No No   Sig: Take 1 tablet (10 mg) by mouth nightly as needed for sleep   senna-docusate (SENOKOT-S/PERICOLACE) 8.6-50 MG tablet   No No   Sig: Take 1-2 tablets by  mouth 2 times daily as needed for constipation   sodium chloride, PF, 0.9% PF flush   No No   Sig: 3 mLs by Intracatheter route every 8 hours   tamsulosin (FLOMAX) 0.4 MG capsule   Yes No   Sig: Take 0.4 mg by mouth every morning   torsemide (DEMADEX) 10 MG tablet   No No   Sig: Take 1 tablet (10 mg) by mouth as needed (For weight gain greater than 3 lbs overnight or increased swelling and/or shortness of breath)   traMADol (ULTRAM-ER) 100 MG 24 hr tablet   No No   Sig: Take 1 tablet (100 mg) by mouth daily as needed for moderate to severe pain      Facility-Administered Medications: None        Review of Systems    The 10 point Review of Systems is negative other than noted in the HPI or here.     Social History   I have reviewed this patient's social history and updated it with pertinent information if needed.  Social History     Tobacco Use    Smoking status: Former     Packs/day: 0.20     Years: 40.00     Additional pack years: 0.00     Total pack years: 8.00     Types: Cigarettes     Start date:      Quit date: 2008     Years since quittin.0    Smokeless tobacco: Never   Vaping Use    Vaping Use: Never used   Substance Use Topics    Alcohol use: Not Currently     Alcohol/week: 35.0 standard drinks of alcohol    Drug use: Not Currently         Family History   I have reviewed this patient's family history and updated it with pertinent information if needed.  Family History   Problem Relation Age of Onset    Family History Negative Mother          age 71    Family History Negative Father          age 70    Diabetes Brother         alive age 77    Diabetes Brother         alive age 76    Family History Negative Brother             Family History Negative Brother             Diabetes Sister         alive age74    Family History Negative Sister          age 86    Heart Disease Sister             Family History Negative Sister             Family  History Negative Sister             Diabetes Sister     Diabetes Sister     Diabetes Brother     Diabetes Brother          Allergies   Allergies   Allergen Reactions    No Known Drug Allergy         Physical Exam   Vital Signs: Temp: 97.3  F (36.3  C) Temp src: Oral BP: (!) 149/63 Pulse: 71   Resp: 16        Weight: 0 lbs 0 oz    Constitutional: awake, alert, cooperative, no apparent distress, and appears stated age  Eyes: Lids and lashes normal, pupils equal, round and reactive to light, extra ocular muscles intact, sclera clear, conjunctiva normal  ENT: Normocephalic, without obvious abnormality, atraumatic, sinuses nontender on palpation, external ears without lesions, oral pharynx with moist mucous membranes  Respiratory: No increased work of breathing, good air exchange, clear to auscultation bilaterally, no crackles or wheezing  Cardiovascular: Normal apical impulse, itregular rate and rhythm, normal S1 and S2, no S3 or S4, and no murmur noted  GI: No scars, normal bowel sounds, soft, non-distended, non-tender, no masses palpated, no hepatosplenomegally  Skin: no bruising or bleeding  Musculoskeletal: no lower extremity pitting edema present  Neurologic: Awake, alert, oriented to name, place and time.  Cranial nerves II-XII are grossly intact.      Medical Decision Making       55  MINUTES SPENT BY ME on the date of service doing chart review, history, exam, documentation & further activities per the note.  MANAGEMENT DISCUSSED with the following over the past 24 hours: patient, bedside RN       Data

## 2023-12-28 NOTE — LETTER
Transition Communication Hand-off for Care Transitions to Next Level of Care Provider    Name: Tc Garcia  : 1940  MRN #: [id number]  Primary Care Provider: Jessika Cordoba     Primary Clinic: [address]  [place] [address]     Reason for Hospitalization:  Hyperglycemia [R73.9]  Inadequately controlled diabetes mellitus (H) [E11.65]  Admit Date/Time: 2023  8:02 AM  Discharge Date: 2023  Payor Source: Payor: MEDICA / Plan: MEDICA [employer] / Product Type: HMO /          Reason for Communication Hand-off Referral: Other Continuity of Care    Discharge Plan:  Patient is discharging to [facility].     Concern for non-adherence with plan of care:   Y/N N  Discharge Needs Assessment:  Follow-up plan:    Future Appointments   Date Time Provider Department Center   2023  3:15 PM Jeremi Clemons, PT [facility] [place]   2024  9:15 AM [facility]   2024 10:00 AM Lacie Tapia, APRN CNP [facility]       Any outstanding tests or procedures:        Referrals       Future Labs/Procedures    Occupational Therapy Adult Consult     Comments:    Evaluate and treat as clinically indicated.    Reason: s/p CVA    Physical Therapy Adult Consult     Comments:    Evaluate and treat as clinically indicated.    Reason:  s/p CVA                JERE Elam    AVS/Discharge Summary is the source of truth; this is a helpful guide for improved communication of patient story

## 2023-12-28 NOTE — PROGRESS NOTES
Triage note    12/28/23  10:57 AM    I was contacted by ED Dr. Olguin about this patient. Mr. Garcia's history is that he is an 83 y.o. man with h/o DM1 on insulin pump, afib on AC, who had fall on 12/11, sustained an intraparenchymal hemorrhage. He was initially in DKA earlier in his hospital stay but he improved sufficiently to discharge to  ARU. He was admitted there yesterday 12/27. Overnight his glucoses were climbing despite having his insulin pump on. Ketones were checked and were found to be 4. He was transferred from ARU to [facility] ED due to concern he was going back into DKA and there was concern for pump failure.   Despite concern for new DKA his bicarb was normal as was his anion gap. He received subcutaneous insulin overnight and in ED.   I was contacted as patient's ambulatory pump was not present with the patient so he'll need to have us treat his diabetes until his pump can be found and can be confirmed to be working.   Of note he got a dose of ceftriaxone in ED for UTI as well. Spoke to hospitalist Dr. Hernadnez re: admission to med surg no tele bed and to determine timing for return to ARU.     Angie Mendoza MD  Internal Medicine/Pediatrics Hospitalist

## 2023-12-28 NOTE — PROGRESS NOTES
I gave the overnight nurse my cell phone number, but did not receive any calls overnight. Assessed BG this am. Appears Ambulatory pump is not working.  and 374. I asked his nurse to disconnect ambulatory pump and give 5 units of Novolog  subcutaneous now. Resumed Lantus 9 units and MSSI as unable to do IV insulin on ARU. Now in DKA, will need to be transferred to start DKA IV drip.

## 2023-12-28 NOTE — PLAN OF CARE
Goal Outcome Evaluation:      Plan of Care Reviewed With: patient    Overall Patient Progress: no changeOverall Patient Progress: no change    Outcome Evaluation: Pt alert and oriented x4, little forgetful.   Denies pain, chest pain or SOB.   Needs Ax1 with walker for mobility. Continent of bowel and bladder, used toilet. Voided x1, PVR 0ml. LBM today.   DM team visited pt. Pt placed insulin pump at 1920.  before supper.  at HS. Pt able to manage insulin pump independently. See MAR.  BP elevated at HS, scheduled antihypertensive given.   Will continue to monitor.

## 2023-12-28 NOTE — PHARMACY-ADMISSION MEDICATION HISTORY
Admission medication history completed at [facility]. Please see Pharmacist Admission Medication History note from 12/11/2023.   Janki AllisonD

## 2023-12-29 PROBLEM — E11.65 INADEQUATELY CONTROLLED DIABETES MELLITUS (H): Status: ACTIVE | Noted: 2023-01-01

## 2023-12-29 NOTE — PHARMACY-ADMISSION MEDICATION HISTORY
Admission medication history completed at [facility]. Please see Pharmacist Admission Medication History note from 12/11/2023.    Rosanne Parada, JankiD, BCPS

## 2023-12-29 NOTE — PLAN OF CARE
[id number]    Goal Outcome Evaluation:    Patient alert and oriented, denies SOB, chest pain, dizziness, numbness/tingling. Continuous pulse ox, stable on room air. Continent of bowel and bladder, LBM 12/29 per pt report. Up assist x 1 with walker and gait belt. Visible skin intact, declined full skin assessment, bruising to BUE. Regular diet, thin liquids, pills whole, denies nausea/vomiting. BG checks, sliding scale, scheduled insulin. Denies pain throughout shift. Mepilex to sacrum/coccyx, changed this shift, C/D/I, mepilex to left wrist changed this shift, C/D/I. Right PIV, saline locked. Resting in bed this shift, respirations even, unlabored. Call light within reach. Able to make needs known. Continue with current plan of care.

## 2023-12-29 NOTE — PROGRESS NOTES
"                          Diabetes Consult Daily  Progress Note          Assessment/Plan:   Tc aGrcia is a 83 year old right hand dominant male with a relevant PMH of DM1 on insulin pump (history of DKA), paroxysmal atrial fibrillation on eliquis, HTN, HLD, CKD stage 3, recurrent pleural effusion, and history of spontaneous intracranial hemorrhage who sustained a left intraparenchymal hematoma after a fall on 12/11/23, and found to have left medial frontal lobe diffusion restriction with sequelae of  left sided weakness, lower extremity more than upper extremity, as well as increased blurriness of vision.     Insulin Dependent type 1 diabetes    - aspart 1 unit per 7, 7, 10 grams carb w/ meals and PRN snack/supp at 10 (uniform 1 per 7 grams is fine for discharge to ARU)  - aspart medium  correction AC--> increase to 1 per 35 now to correct Very elevated morning BG.  HS correction remains medium .  (Medium correction is fine for discharge to ARU)  - glargine 9 units--> add 3 and increase to 12 units every morning.  Plan to step the timing back in preparation for pump restart on TUESDAY (perhaps mid morning)  - hypoglycemia protocol  - education needs : formal pump assessment/support for restart. Consult ordered       Outpatient diabetes follow up: Dr Asif at [facility] clinic of Endocrinology  Plan discussed with patient, bedside RN, and page to primary team.           Interval History:     The last 24 hours progress and nursing notes reviewed.    Tolerating 0.5 units/h IV insulin on top of glargine 9 units yesterday afternoon.  BG spiking after insulin drip discontinuation.  Mr Garcia feels well this  morning.  He has an appetite.  He is anxious to return to pump therapy.  He is familiar with \"twice in doubt, change it out\".   Though did not change out infusion set on Wed night.  He reflects on the impact of body positioning when he tried to insert.  He also raised a concern about max bolus, when he was trying to " "correct the hyperglycemia--> the exact limitation/warning is not clear yet.      This morning no anion gap prolongation, CO2 27.  .  Pt without nausea and feeling well.  He is excited at prospect to get back to ARU, then to home within 12 days.          Recent Labs   Lab 12/29/23  0533 12/29/23  0145 12/28/23  2127 12/28/23  1824 12/28/23  1717 12/28/23  1627   * 282* 268* 117* 119* 124*          Medications impacting glycemia:   no steroid        Nutrition:     Orders Placed This Encounter      Combination Diet Regular Diet Adult            PTA Regimen:   INSULIN PUMP -  [employer] minimed  Sensitivity factor (midnight to midnight): 60  Bolus (units:gram): 1228-4721 = 1:9, 3186-5799 = 1:7, 8915-7687 = 1:7, 1820-4458 = 1:9, 0620-7053 = 1:13   Basal Rates and Start/End times:   Rate #1 =  0.45 units/hr from 0000 to 0200.   Rate #2 = 0.45 units/hr from 0201 to 0600.   Rate #3 = 0.625 units/hr from 0601 to 0900.   Rate #4 = 0.625 units/hr from 0901 to  1700   Rate #5 = 0.50 units/hr from 1701 to 2359.      Maintain blood glucose level within a specified target range  8417-4822 110-150  3897-7297 100-120  3522-9984 100-140    Active insulin time 4 hours               Review of Systems:   See interval hx            Physical Exam:     Gen: Alert, in NAD   HEENT: NC/AT,  hearing intact to conversational volume  Resp: Unlabored  Neuro: oriented x3, communicating clearly  Psych: calm mood, easily engaged  /78 (BP Location: Left arm)   Pulse 82   Temp 98  F (36.7  C) (Oral)   Resp 16   Ht 1.753 m (5' 9.02\")   Wt 62.6 kg (138 lb 0.1 oz)   SpO2 96%   BMI 20.37 kg/m               Data:     Lab Results   Component Value Date    A1C 8.1 12/12/2023    A1C 8.0 04/20/2023    A1C 7.2 01/17/2023    A1C 7.7 11/20/2020    A1C 7.4 07/08/2020    A1C 8.2 04/25/2020    A1C 7.7 10/31/2019    A1C 7.2 06/30/2009            Lab Results   Component Value Date    CREAT 1.9 (H) 11/20/2020         CBC RESULTS:   Recent Labs " "  Lab Test 12/29/23  0533   WBC 13.2*   RBC 3.60*   HGB 10.0*   HCT 31.4*   MCV 87   MCH 27.8   MCHC 31.8   RDW 17.8*        Recent Labs   Lab Test 12/29/23  0533 12/29/23  0145 12/28/23  1517 12/28/23  1455     --   --  143   POTASSIUM 3.9  --   --  4.7   CHLORIDE 103  --   --  106   CO2 27  --   --  30*   ANIONGAP 9  --   --  7   * 282*   < > 127*   BUN 19.7  --   --  19.7   CR 1.43*  --   --  1.46*   LLOYD 8.6*  --   --  8.7*    < > = values in this interval not displayed.     Liver Function Studies -   Recent Labs   Lab Test 12/29/23  0533   PROTTOTAL 5.3*   ALBUMIN 3.4*   BILITOTAL 0.3   ALKPHOS 69   AST 9   ALT <5     Lab Results   Component Value Date    INR 1.35 12/11/2023     No results found for: \"HEBMP\", \"COMCBC\"      50 minutes spent on the date of the encounter doing chart review, history and exam, coordinating care/communication, documentation and further activities per the note.    Radha NELSON CNS BC-ADM [phone]    To contact Endocrinology Diabetes Management service:   From [phone]: Kenan or page inpatient diabetes provider that is following the patient that day (see filed or incomplete progress notes/consult notes).  If uncertain of provider assignment: page job code [phone].  For nursing questions or updates from [phone], please first page the primary team.  Primary team provider will then reach out to the on-call endocrinology fellow as needed.    Please notify inpatient diabetes service if changes are planned that will impact glycemia, such as changes to steroids, enteral feeding, parenteral feeding, dextrose fluids or procedures requiring prolonged NPO status.                  "

## 2023-12-29 NOTE — PLAN OF CARE
Goal Outcome Evaluation:    Plan of Care Reviewed With: patient  Overall Patient Progress: improving  Outcome Evaluation: Pt discharging to [facility] today.    Assessment: Pt A&Ox4 and able to make needs known. Pt is assist of 1 w/ walker and gait belt. Pt denies SOB, CP, N/V, and dizziness. Pt is continent of bowel and bladder and uses call light when needed for bathroom. LBM: 12/29. Pt is on regular diet w/ carb coverage/counting w/ meals and takes pills whole. Pt denied pain during shift. Pt has mepilex on L wrist and coccyx.     Major Shift Changes: Discharge to [facility]    Plan:   - Plan to discharge to [facility] today      DISCHARGE NOTE  Patient discharged to [facility] at 3:50 PM via wheel chair. Accompanied by staff. Discharge instructions reviewed with patient, opportunity offered to ask questions. Prescriptions - None ordered for discharge. All belongings sent with patient.    Love Smith RN

## 2023-12-29 NOTE — CONSULTS
Care Management Discharge Note    Discharge Date: 12/29/2023       Discharge Disposition: Acute Rehab    [facility] (5th Floor)  [address] [place] [address]  [phone]  Fax: [phone]  RN to RN:  [phone]      Discharge Services: None    Discharge DME: None    Discharge Transportation: other (see comments) ([facility] Patient Transpo Rollover)    Private pay costs discussed: Not applicable    Does the patient's insurance plan have a 3 day qualifying hospital stay waiver?  Yes     Which insurance plan 3 day waiver is available? Alternative insurance waiver    Will the waiver be used for post-acute placement? No    PAS Confirmation Code:  Not needed d/t acute rehab  Patient/family educated on Medicare website which has current facility and service quality ratings: no    Education Provided on the Discharge Plan: Yes  Persons Notified of Discharge Plans: bedside nurse, charge nurse, patient, provider, facility  Patient/Family in Agreement with the Plan: yes    Handoff Referral Completed: Yes    Additional Information:    Please see Progress Note written by this writer today for Initial Assessment.    Patient discharging back to [facility] today. Patient is looking forward to beginning his programming. He is feeling better today and had no further questions or concerns.    Informed bedside nurse and charge nurse. Provider is aware and working on discharge summary.     rehab liaison requested 3:15pm rollover. This was communicated to charge nurse.        BAYRON ElamW, LSW  8 Med Surg   10U beds: -  [facility]  Phone: [phone]  Pager: [phone]

## 2023-12-29 NOTE — UTILIZATION REVIEW
"Admission Status; Secondary Review Determination     Under the authority of the Utilization Management Committee, the utilization review process indicated a secondary review on the above patient.  The review outcome is based on review of the medical records, discussions with staff, and applying clinical experience noted on the date of the review.       (x) Observation Status Appropriate - This patient does not meet hospital inpatient criteria and is placed in observation status. If this patient's primary payer is Medicare and was admitted as an inpatient, Condition Code 44 should be used and patient status changed to \"observation\".     RATIONALE FOR DETERMINATION:  83-year-old male hospitalized from 12/11 - 12/27 after a fall resulting in intraparenchymal hematoma.  Patient has type 1 diabetes and was transferred to inpatient rehab on the afternoon of the 27th.  However there was a malfunction of the insulin pump resulted in hyperglycemia with ketosis that resolved overnight with correction of the previous malposition insulin pump.      The severity of illness, intensity of service provided, expected LOS and risk for adverse outcome make the care appropriate for further observation; however, doesn't meet criteria for hospital inpatient admission. This was discussed with attending physician who concurred with this determination.    The information on this document is developed by the utilization review team in order for the business office to ensure compliance.  This only denotes the appropriateness of proper admission status and does not reflect the quality of care rendered.         The definitions of Inpatient Status and Observation Status used in making the determination above are those provided in the CMS Coverage Manual, Chapter 1 and Chapter 6, section 70.4.      Sincerely,     Alfa Brand MD    Physician Advisor  Utilization Review/ Case Management  [facility].   "

## 2023-12-29 NOTE — PLAN OF CARE
"Goal Outcome Evaluation:       Patient is A & O x 4; coherent of speech, and able to make his needs known to staff; patient arrived to this unit from ED at 1800, with Dx of hyperglycemia, and is now on scheduled insulin and per sliding scale. Patient participated actively with admission assessment; ordered, received and ate his dinner. Able to ambulate to the bathroom, with FWW; denied SOB, pain, and discomfort; denied any any skin breakdown, except bruising to upper extremities. Will follow POC; call light is within reach.  BP (!) 165/81 (BP Location: Left arm, Patient Position: Supine, Cuff Size: Adult Regular)   Pulse 85   Temp 97.9  F (36.6  C) (Oral)   Resp 16   Ht 1.753 m (5' 9.02\")   Wt 62.6 kg (138 lb 0.1 oz)   SpO2 98%   BMI 20.37 kg/m      Sachin Molina RN                    "

## 2023-12-29 NOTE — CONSULTS
[facility]  Consult Note - Hospitalist Service  Date of Admission:  12/29/2023  Consult Requested by: Dr. Red  Reason for Consult: Assistance with blood sugar management    Assessment & Plan   Tc Garcia is a 83 year old male with a history of paroxysmal atrial fibrillation on Eliquis, pulmonary hypertension, diabetes mellitus type I, who was initially admitted to [facility] 12/11/2023-12/27 for intracranial hemorrhage after fall. Patient was transferred to [facility] ARU for further rehabilitation, but was noted to have significant hyperglycemia in the setting of likely pump failure, thus was sent back to [facility] 12/28- 12/29. Endocrinology was involved and assisted with insulin regimen. Patient stable for transfer back to the ARU 12/29. Medicine was consulted for assistance with blood sugar management, endo also following.     Hyperglycemia, improving  Ketosis, resolved  Diabetes Mellitus Type I  Insulin pump initially held at [facility] due to intraparenchymal hematoma.  Was restarted on 12/23/2023 but was found to be in DKA the following day.  DKA did resolve by 12/25/2023.  He was transitioned to Lantus during his hospitalization, and then discharged to acute rehab on his insulin pump. Arrived to ARU hyperglycemic and subsequently transferred to the ED. Found to have ketones, but no evidence of DKA. Etiology of hyperglycemia was likely pump failure due to malpositioning of pump. Endocrinology was consulted and restarted subcutaneous insulin regimen. Patient was transferred back to the ARU and endocrinology planning on following closely and likely will restart insulin pump 1/2.   - Endocrinology following  - Insulin regimen for now until pump restarted  - Lantus 12 units every morning  - Carb coverage of 1 per 7 grams  - Medium intensity correction scale      Intraparenchymal hematoma   H/o spontaneous intracranial hemorrhage (1/2023)  paroxysmal atrial  fibrillation (on eliquis prior to admission)  Presented with dizziness diplopia and nausea to [facility] on 12/11. Found to have intraparenchymal hematoma on imaging. Etiology for intraparenchymal hematoma likely hypertensive and in the setting of chronic anticoagulation with Eliquis versus traumatic after sudden onset of dizziness and fall. Anticoagulation was reversed with Kcentra in ED and he was admitted to the ICU for close monitoring. Neurosurgery was consulted, but patient did not wish for any surgical intervention, thus signed off.   - Follow up with cardiology in one month  - Follow up with stroke neurology in 6-8 weeks      UTI due to Klebsiella pneumoniae  Possible aspiration pneumonia.  Noted to have agitation and confusion on 12/12 &12/13. Work-up with leukocytosis and concern for UTI on UA and aspiration pneumonia on CXR. Urine culture positive for Klebsiella pneumonia. Start on ceftriaxone, subsequently switched to cefuroxime to complete course. Noted to have lactic acidosis again on 12/24, restarted on broad spectrum antibiotics and eventually discharged on IV ceftriaxone (will complete 12/31).  - Continue IV ceftriaxone for now (complete 12/31).     Chronic diastolic CHF  Atrial fibrillation PTA on Eliquis.  Hypertension.  Hyperlipidemia.  Prior to admission Eliquis on hold since admission to [facility]. Most recent echocardiogram with EF of 60 to 65%.  Severe biatrial enlargement.  Mild to moderate TR. Telemetry  with atrial fibrillation.  - Continue PTA carvedilol  - Continue Avapro   - Hold PTA Demadex, cardiology recently advised changing to as needed dosing  - Follow-up with cardiology after discharge, discuss watchman procedure      Stable/Resolved/Chronic Medical Conditions:  CKD stage III Baseline creatinine ~1.5. Avoid nephrotoxins.  Chronic C7 compression fracture Seen by neurosurgery on 12/12 who stated that fracture likely healed and given that x-ray showed no dynamic  instability, no surgical intervention or C collar needed.     The patient's care was discussed with the Patient and Primary team via this note.    Clinically Significant Risk Factors Present on Admission          # Hypocalcemia: Lowest Ca = 8.3 mg/dL in last 2 days, will monitor and replace as appropriate     # Hypoalbuminemia: Lowest albumin = 3.4 g/dL at 12/29/2023  5:33 AM, will monitor as appropriate  # Drug Induced Coagulation Defect: home medication list includes an anticoagulant medication    # Hypertension: Noted on problem list  # Chronic heart failure with preserved ejection fraction: heart failure noted on problem list and last echo with EF >50%    # DMII: A1C = 8.1 % (Ref range: <5.7 %) within past 6 months       # Financial/Environmental Concerns:           Jaylyn Odonnell PA-C  Hospitalist Service  Securely message with [employer] (more info)  Text page via [facility] Paging/Directory   ______________________________________________________________________    Chief Complaint   High blood sugars    History is obtained from the patient and patient's chart    History of Present Illness   Tc Garcia is a 83 year old male with a history of paroxysmal atrial fibrillation on Eliquis, pulmonary hypertension, diabetes mellitus type II, who was initially admitted to [facility] 12/11/2023-12/27 for intracranial hemorrhage after fall. Patient was transferred to [facility] ARU for further rehabilitation, but was noted to have significant hyperglycemia in the setting of likely pump failure, thus was sent back to [facility] 12/28- 12/29. Endocrinology was involved and assisted with insulin regimen. Patient stable for transfer back to the ARU 12/29. Medicine was consulted for assistance with blood sugar management.     Patient reports he is doing well on transfer--denies any acute complaints at this time.    Past Medical History    Past Medical History:   Diagnosis Date    Anemia     Anemia of chronic disease 5/14/2020     Back pain     CKD (chronic kidney disease) stage 3, GFR 30-59 ml/min (H)     CRF (chronic renal failure), stage 3  5/14/2020    Fall 11/2019    suffered multiple left rib fractures, C3 and T2 fractures    Mixed hyperlipidemia 7/7/2004    Paroxysmal atrial fibrillation (H)     Personal history of colonic polyps 1972    gets colonoscopy every five years, due in 2006    Pleural effusion on left 11/2019    after multiple rib fractures    Pulmonary hypertension (H) 5/10/2020    Added automatically from request for surgery [id number]    Recurrent Left Pleural effusion -- S/P Pleurex Cath 5/12/20 12/30/2019    Rosacea 1989    Type II or unspecified type diabetes mellitus without mention of complication, not stated as uncontrolled 1999    Unspecified essential hypertension 1994       Past Surgical History   Past Surgical History:   Procedure Laterality Date    ANESTHESIA CARDIOVERSION N/A 2/3/2020    Procedure: ANESTHESIA, FOR CARDIOVERSION;  Surgeon: GENERIC ANESTHESIA PROVIDER;  Location:  OR     RIGHT HEART CATH MEASUREMENTS RECORDED N/A 5/13/2020    Procedure: Right Heart Cath;  Surgeon: Senthil Silva MD;  Location:  HEART CARDIAC CATH LAB    HC REMOVE TONSILS/ADENOIDS,<13 Y/O      T & A <11y.o.    IR CHEST TUBE DRAIN TUNNELED LEFT  5/12/2020    IR CHEST TUBE PLACEMENT NON-TUNNELLED LEFT  7/11/2020    IR CHEST TUBE REMOVAL NON TUNNELED LEFT  7/17/2020    IR CHEST TUBE REMOVAL TUNNELED LEFT  7/11/2020    LAPAROSCOPIC CHOLECYSTECTOMY N/A 11/22/2020    Procedure: CHOLECYSTECTOMY, LAPAROSCOPIC;  Surgeon: Annette Lambert MD;  Location:  OR    LAPAROSCOPIC HERNIORRHAPHY INGUINAL BILATERAL Bilateral 10/27/2020    Procedure: LAPAROSCOPIC BILATERAL INGUINAL HERNIA REPAIR WITH MESH;  Surgeon: Brian Garsia MD;  Location:  OR       Medications   I have reviewed this patient's current medications        Physical Exam   Vital Signs: Temp: 97.8  F (36.6  C) Temp src: Oral BP: (!) 146/82 Pulse: 70   Resp:  16 SpO2: 95 % O2 Device: None (Room air)    Weight: 141 lbs 0 oz    General Appearance: Comfortable, nontoxic appearing male seen laying in bed.  Eyes: PERRLA.  No conjunctival icterus.  HEENT: Atraumatic.  Respiratory: Breathing comfortably on room air.  Lung sounds clear to auscultation throughout.  No wheezes, rhonchi, rales.  Cardiovascular: RRR.  No murmurs, rubs, gallops.  GI: Bowel sounds present throughout.  Abdomen soft, nontender.  Lymph/Hematologic: No bruising on exposed skin.  Skin: No lesions or rashes noted on exposed skin.  Musculoskeletal: Moving all extremities spontaneously.  Neurologic: Cranial nerves II through XII grossly intact.  Psychiatric: Mood appropriate.    Medical Decision Making     40 MINUTES SPENT BY ME on the date of service doing chart review, history, exam, documentation & further activities per the note.      Data   Recent Labs   Lab 12/30/23  1059 12/30/23  0744 12/30/23  0621 12/29/23  0745 12/29/23  0533 12/28/23  1517 12/28/23  1455 12/28/23  1143 12/28/23  0848   WBC  --   --  11.2*  --  13.2*  --   --   --  11.6*   HGB  --   --  9.5*  --  10.0*  --   --   --  10.4*   MCV  --   --  85  --  87  --   --   --  86   PLT  --   --  234  --  258  --   --   --  260   NA  --   --  138  --  139  --  143  --  139  139   POTASSIUM  --   --  3.7  --  3.9  --  4.7  --  3.6  3.6   CHLORIDE  --   --  102  --  103  --  106  --  101  101   CO2  --   --  27  --  27  --  30*  --  25  25   BUN  --   --  14.7  --  19.7  --  19.7  --  21.4  21.4   CR  --   --  1.41*  --  1.43*  --  1.46*  --  1.56*  1.56*   ANIONGAP  --   --  9  --  9  --  7  --  13  13   LLOYD  --   --  8.3*  --  8.6*  --  8.7*  --  8.9  8.9   * 243* 202*   < > 403*   < > 127*   < > 347*  347*   ALBUMIN  --   --   --   --  3.4*  --   --   --  3.4*   PROTTOTAL  --   --   --   --  5.3*  --   --   --  5.2*   BILITOTAL  --   --   --   --  0.3  --   --   --  0.4   ALKPHOS  --   --   --   --  69  --   --   --  70   ALT   --   --   --   --  <5  --   --   --  <5   AST  --   --   --   --  9  --   --   --  9    < > = values in this interval not displayed.

## 2023-12-29 NOTE — DISCHARGE SUMMARY
[facility]  Hospitalist Discharge Summary      Date of Admission:  12/28/2023  Date of Discharge:  12/29/2023  Discharging Provider: Mathew Odonnell MD  Discharge Service: Hospitalist Service, GOLD TEAM 18    Discharge Diagnoses   DKA secondary to malpositioned insulin pump, resolved.    Recent Intraparenchymal hematoma  Recent UTI due to Klebsiella  Possible aspiration pneumonia  Chronic diastolic CHF  Chronic afib, previously on apixaban  CKD stage 3  Chronic C7 compression fracture    Clinically Significant Risk Factors     # DMII: A1C = 8.1 % (Ref range: <5.7 %) within past 6 months       Follow-ups Needed After Discharge   Diabetes service to follow for transition back to insulin pump    Unresulted Labs Ordered in the Past 30 Days of this Admission       Date and Time Order Name Status Description    12/29/2023  5:41 AM Urine Culture In process     12/24/2023 10:03 AM Blood Culture Arm, Left Preliminary     12/24/2023 10:03 AM Blood Culture Arm, Left Preliminary         These results will be followed up by ARU    Discharge Disposition   Discharged to rehabilitation facility  Condition at discharge: Stable    Hospital Course      Tc Garcia is a 83 year old male admitted on 12/28/2023. He has a history of paroxysmal atrial fibrillation on Eliquis, pulmonary hypertension, recent hospitalization for intracranial hemorrhage from 12/11-12/27/2023 after a fall with head injury resulting in intraparenchymal hematoma.  His insulin pump was held given this intraparenchymal hematoma.  His insulin pump was restarted on 12/23/2023 but was found to be in DKA the following day.  His DKA did resolve by 12/25/2023.  He was transitioned to Lantus during his hospitalization, and then discharged to acute rehab unit just  where they reinitiated his insulin pump, he was transferred to the acute hospital setting however 12/28/2023 secondary to elevated blood glucoses.  It was  subsequently discovered that the insulin pump needle was malpositioned.  Patient was seen by the diabetes service who recommended the resumption of subcutaneous insulin for the next few days, with eventual transition back to the pump after the patient has been discharged back to acute rehab.  Patient never developed anion gap but did have ketones in his blood.  Blood glucoses have primarily been in the 100s to 300s.  Diabetes team recommends the initiation of 12 units of Lantus daily, Humalog mealtime coverage 1 unit per 10 unit carbs with dinner and snacks and 1 unit for 7 unit carbs for breakfast and lunch in addition to sliding scale.        Hyperglycemia, Ketosis, no acidosis noted  As noted above.  Plan per endocrinology is to continue Lantus and prandial insulin for the next few days followed by the transition back to insulin pump    Intraparenchymal hematoma  .  H/o spontaneous intracranial hemorrhage (1/2023)  paroxysmal atrial fibrillation (on eliquis prior to admission)  Presented with dizziness diplopia and nausea to [facility] on 12/11  *HCT with 1.5 X 3 cm intraparenchymal hematoma centered in inferior cerebellar vermis with likely extension into fourth ventricle; no hydrocephalus  *CTA head and neck noted some atherosclerotic plaque with questionable active contrast extravasation into the hematoma  *CT cervical spine noted with compression fracture deformities C7 with slight further loss of height since comparison study and noted with mild degenerative changes  *MRI brain with unchanged left cerebellar intraparenchymal hematoma with intraventricular extension, small volume subarachnoid hemorrhage involving the bilateral cerebral sulci, 0.1 x 0.3 cm area of diffusion restriction along medial left frontal lobe which may represent acute infarct, punctate foci of chronic microhemorrhage in bilateral cerebellar hemispheres  Echocardiogram with EF of 60 to 65%.  Severe biatrial enlargement.  Mild to  "moderate TR.  --- Etiology for intraparenchymal hematoma likely hypertensive and in the setting of chronic anticoagulation with Eliquis versus traumatic after sudden onset of dizziness and fall.  Anticoagulation was reversed with Kcentra in ED  Admitted to ICU initially for close monitoring  Stroke neurology Followed: \"HOLD anticoagulation. Not an ASPIRE candidate given ICH within the past year.   Systolic BP Goal: 130-150.    Neurosurgery followed, \"Patient is DNR/DNI, does not wish any intervention from neurosurgery, particular surgical intervention or an EVD. Neurosurgery will sign off.\"  PT/OT consulted, wjo recommended  patient discharging to ARU   On subcutaneous Lovenox for DVT prophylaxis,   With anticoagulation on hold, the plan is to have him follow up with cardiology in 1 month to discuss Watchman.   Follow-up with stroke neurology in 6-8 weeks        UTI due to Klebsiella pneumoniae  Possible aspiration pneumonia.  -- On 12/12 to 12/13 had agitation, confusion.  Had leukocytosis.  Blood cultures  no growth  UA with > 182 WBC/hpf, large leukocyte esterase.   Urine culture has greater than 100,000 CFU/mL Klebsiella pneumoniae  Chest x-ray right greater than left lower lung opacity suspicious for aspiration/infection.  - Was on IV ceftriaxone, subsequently switched to cefuroxime to complete course  - Subsequently, patient had a repeat code for elevated laccate on 12/24, during which he was restarted on IV antibiotics ( broad spectrum) and tapered to  IV ceftriaxone with discharge plans to continue IV abx for a total of 7 days. This period would end on 12/31           Chronic diastolic CHF  Atrial fibrillation PTA on Eliquis.  Hypertension.  Hyperlipidemia.  Prior to admission Eliquis on hold since admission.  Echocardiogram with EF of 60 to 65%.  Severe biatrial enlargement.  Mild to moderate TR.  Telemetry  with atrial fibrillation.  Continue PTA carvedilol, avoid rebound tachycardia  Continue Avapro " "  hold PTA Demadex, cardiology recently advised changing to as needed dosing  As needed labetalol and hydralazine available  Follow-up with cardiology after discharge, discuss watchman procedure      Chronic C7 compression fracture.  Cervical flexion-extension x-ray without any dynamic instability.  Per chart review neurosurgery 12/12 recommending likely healed since his initial imaging 4 years ago.  Given no dynamic instability on x-ray recommend no cervical collar needed.     CKD stage III  *Baseline creatinine around 1 to 1.1 in early 2023, more recently around 1.5  *Creatinine on admission 1.66  Continue Irbesartan  \"Advised not to start Entresto given renal function, then due to further decline in renal function torsemide changed to PRN.\"  Creatinine stable           Consultations This Hospital Stay   ENDOCRINE DIABETES ADULT IP CONSULT  PHYSICAL THERAPY ADULT IP CONSULT  OCCUPATIONAL THERAPY ADULT IP CONSULT  DIABETES EDUCATION IP CONSULT  CARE MANAGEMENT / SOCIAL WORK IP CONSULT  PHYSICAL THERAPY ADULT IP CONSULT  OCCUPATIONAL THERAPY ADULT IP CONSULT    Code Status   No CPR- Do NOT Intubate    Time Spent on this Encounter   I, Mathew Odonnell MD, personally saw the patient today and spent greater than 30 minutes discharging this patient.       Mathew Odonnell MD  [facility] [address]  [address] [place] [id number]  Phone: [phone]  Fax: [phone]  ______________________________________________________________________    Physical Exam   Vital Signs: Temp: 97.5  F (36.4  C) Temp src: Oral BP: (!) 156/83 Pulse: 84   Resp: 16 SpO2: 98 % O2 Device: None (Room air)    Weight: 138 lbs .13 oz  Alert, fully oriented, very pleasant  HEENT oral mucosa  Lungs clear  CV irreg, HR 60s  Abd soft  No LE edema  NEURO face symmetric       Primary Care Physician   Jessika Cordoba    Discharge Orders      Glucose monitor nursing POCT    Before meals and at bedtime     Reason for your hospital " stay    Pt was hospitalized for the management of uncontrolled DM secondary to malpositioned insulin pump     Activity - Up ad michelle     No CPR- Do NOT Intubate     Physical Therapy Adult Consult    Evaluate and treat as clinically indicated.    Reason:  s/p CVA     Occupational Therapy Adult Consult    Evaluate and treat as clinically indicated.    Reason: s/p CVA     Fall precautions     Diet    Follow this diet upon discharge: Orders Placed This Encounter      Combination Diet Regular Diet Adult       Significant Results and Procedures   Most Recent 3 BMP's:  Recent Labs   Lab Test 12/29/23  1055 12/29/23  0745 12/29/23  0533 12/28/23  1517 12/28/23  1455 12/28/23  1143 12/28/23  0848   NA  --   --  139  --  143  --  139  139   POTASSIUM  --   --  3.9  --  4.7  --  3.6  3.6   CHLORIDE  --   --  103  --  106  --  101  101   CO2  --   --  27  --  30*  --  25  25   BUN  --   --  19.7  --  19.7  --  21.4  21.4   CR  --   --  1.43*  --  1.46*  --  1.56*  1.56*   ANIONGAP  --   --  9  --  7  --  13  13   LLOYD  --   --  8.6*  --  8.7*  --  8.9  8.9   * 375* 403*   < > 127*   < > 347*  347*    < > = values in this interval not displayed.       Discharge Medications   Current Discharge Medication List        CONTINUE these medications which have CHANGED    Details   insulin glargine (LANTUS PEN) 100 UNIT/ML pen Inject 12 Units Subcutaneous every 24 hours    Comments: If Lantus is not covered by insurance, may substitute Basaglar or Semglee or other insulin glargine product per insurance preference at same dose and frequency.    Associated Diagnoses: Type 2 diabetes mellitus with other specified complication, unspecified whether long term insulin use (H)           CONTINUE these medications which have NOT CHANGED    Details   acetaminophen (TYLENOL) 325 MG tablet Take 2 tablets (650 mg) by mouth every 6 hours as needed for mild pain or fever  Refills: 0    Associated Diagnoses: Pain      amLODIPine (NORVASC)  10 MG tablet Take 1 tablet (10 mg) by mouth daily    Associated Diagnoses: Essential hypertension      carvedilol (COREG) 12.5 MG tablet Take 1 tablet (12.5 mg) by mouth 2 times daily (with meals)    Associated Diagnoses: Essential hypertension      cefTRIAXone (ROCEPHIN) 1 GM vial Inject 1 g into the vein every 24 hours    Associated Diagnoses: Acute cystitis without hematuria      Continuous Blood Gluc  (DEXCOM G6 ) JOSE Dexcom G6    USE EACH WITH 10 DAYS SENSORS      enoxaparin ANTICOAGULANT (LOVENOX) 30 MG/0.3ML syringe Inject 0.3 mLs (30 mg) Subcutaneous every 24 hours    Associated Diagnoses: Immobility      glucose 40 % (400 mg/mL) gel Take 15-30 g by mouth every 15 minutes as needed for low blood sugar    Associated Diagnoses: Type 2 diabetes mellitus with other specified complication, unspecified whether long term insulin use (H)      !! insulin lispro (HUMALOG) 100 UNIT/ML Cartridge Inject 1 Units Subcutaneous 3 times daily (before meals)    Associated Diagnoses: Type 2 diabetes mellitus with other specified complication, unspecified whether long term insulin use (H)      !! insulin lispro (HUMALOG) 100 UNIT/ML Cartridge Inject 1-4 Units Subcutaneous 2 times daily (before meals)    Associated Diagnoses: Type 2 diabetes mellitus with other specified complication, unspecified whether long term insulin use (H)      !! insulin lispro (HUMALOG) 100 UNIT/ML Cartridge Inject 1-7 Units Subcutaneous 3 times daily (before meals)    Associated Diagnoses: Type 2 diabetes mellitus with other specified complication, unspecified whether long term insulin use (H)      !! insulin lispro (HUMALOG) 100 UNIT/ML Cartridge Inject 1-5 Units Subcutaneous 2 times daily (before meals)    Associated Diagnoses: Type 2 diabetes mellitus with other specified complication, unspecified whether long term insulin use (H)      !! insulin lispro (HUMALOG) 100 UNIT/ML vial 1 units per 7 grams of carbohydrate with  breakfast    Associated Diagnoses: Type 2 diabetes mellitus with other specified complication, with long-term current use of insulin (H)      !! insulin lispro (HUMALOG) 100 UNIT/ML vial 1 units per 10 grams of carbohydrate with supper    Associated Diagnoses: Type 2 diabetes mellitus with other specified complication, with long-term current use of insulin (H)      INSULIN PUMP - OUTPATIENT Med[employer] device with no CGM and site change every 3 days   Sensitivity factor (midnight to midnight): 55  Bolus (units:gram): 7509-5452 = 1:9, 7946-7558 = 1:7, 6871-1431 = 1:7, 8505-0841 = 1:9, 0755-1248 = 1:13  Basal (units/hour): 2275-6332 = 0.45, 1070-5158 = 0.5, 5084-1531 = 0.625, 1429-9679 = 0.6, 6162-4335 = 0.45      irbesartan (AVAPRO) 300 MG tablet Take 1 tablet (300 mg) by mouth At Bedtime  Qty: 90 tablet, Refills: 3    Associated Diagnoses: Chronic diastolic heart failure (H); Essential hypertension; Acute on chronic heart failure with preserved ejection fraction (HFpEF) (H); Chronic heart failure with preserved ejection fraction (HFpEF) (H)      isosorbide mononitrate (IMDUR) 60 MG 24 hr tablet Take 1 tablet (60 mg) by mouth every evening    Associated Diagnoses: Essential hypertension      !! melatonin 10 MG TABS tablet Take 1 tablet (10 mg) by mouth nightly as needed for sleep    Associated Diagnoses: Insomnia, unspecified type      !! melatonin 10 MG TABS tablet Take 1 tablet (10 mg) by mouth at bedtime    Associated Diagnoses: Persistent insomnia      senna-docusate (SENOKOT-S/PERICOLACE) 8.6-50 MG tablet Take 1-2 tablets by mouth 2 times daily as needed for constipation    Associated Diagnoses: Constipation, unspecified constipation type      sodium chloride, PF, 0.9% PF flush 3 mLs by Intracatheter route every 8 hours    Associated Diagnoses: Hypotension, unspecified hypotension type      tamsulosin (FLOMAX) 0.4 MG capsule Take 0.4 mg by mouth every morning      traMADol (ULTRAM-ER) 100 MG 24 hr tablet Take 1  tablet (100 mg) by mouth daily as needed for moderate to severe pain  Qty: 6 tablet, Refills: 0    Associated Diagnoses: Pain in extremity, unspecified extremity       !! - Potential duplicate medications found. Please discuss with provider.        STOP taking these medications       torsemide (DEMADEX) 10 MG tablet Comments:   Reason for Stopping:             Allergies   Allergies   Allergen Reactions    No Known Drug Allergy      Please see diabetes service note for orders for prandial insulin per carb counting and sliding scale coverage

## 2023-12-29 NOTE — PROGRESS NOTES
Care Management Initial Consult    General Information  Assessment completed with: Patient, VM-chart review,    Type of CM/SW Visit: Initial Assessment    Primary Care Provider verified and updated as needed: Yes   Readmission within the last 30 days:        Reason for Consult: discharge planning  Advance Care Planning: Advance Care Planning Reviewed: no concerns identified          Communication Assessment  Patient's communication style: spoken language (English or Bilingual)             Cognitive  Cognitive/Neuro/Behavioral: WDL  Level of Consciousness: alert  Arousal Level: opens eyes spontaneously  Orientation: oriented x 4  Mood/Behavior: calm, cooperative  Best Language: 0 - No aphasia  Speech: logical, clear    Living Environment:   People in home: spouse     Current living Arrangements: house      Able to return to prior arrangements: other (see comments) (Patient will return to [facility]       Family/Social Support:  Care provided by: self, child(abelardo)  Provides care for: spouse  Marital Status:   Wife, Children          Description of Support System: Supportive, Involved    Support Assessment: Adequate family and caregiver support, Adequate social supports    Current Resources:   Patient receiving home care services: No     Community Resources: None  Equipment currently used at home:    Supplies currently used at home: Diabetic Supplies (has insulin pump)    Employment/Financial:  Employment Status: retired        Financial Concerns: none   Referral to Financial Worker: No       Does the patient's insurance plan have a 3 day qualifying hospital stay waiver?  Yes     Which insurance plan 3 day waiver is available? Alternative insurance waiver    Will the waiver be used for post-acute placement? No    Lifestyle & Psychosocial Needs:  Social Determinants of Health     Food Insecurity: Low Risk  (10/16/2023)    Food Insecurity     Within the past 12 months, did you worry that your food would run out before  you got money to buy more?: No     Within the past 12 months, did the food you bought just not last and you didn t have money to get more?: No   Depression: Not at risk (11/16/2023)    PHQ-2     PHQ-2 Score: 0   Housing Stability: Low Risk  (10/16/2023)    Housing Stability     Do you have housing? : Yes     Are you worried about losing your housing?: No   Tobacco Use: Medium Risk (11/20/2023)    Patient History     Smoking Tobacco Use: Former     Smokeless Tobacco Use: Never     Passive Exposure: Not on file   Financial Resource Strain: Low Risk  (10/16/2023)    Financial Resource Strain     Within the past 12 months, have you or your family members you live with been unable to get utilities (heat, electricity) when it was really needed?: No   Alcohol Use: Not At Risk (10/27/2020)    AUDIT-C     Frequency of Alcohol Consumption: Never     Average Number of Drinks: Not on file     Frequency of Binge Drinking: Not on file   Transportation Needs: Low Risk  (10/16/2023)    Transportation Needs     Within the past 12 months, has lack of transportation kept you from medical appointments, getting your medicines, non-medical meetings or appointments, work, or from getting things that you need?: No   Physical Activity: Not on file   Interpersonal Safety: Not on file   Stress: Not on file   Social Connections: Not on file       Functional Status:  Prior to admission patient needed assistance:   Dependent ADLs:: Independent  Dependent IADLs:: Independent  Assesssment of Functional Status: Not at baseline with ADL Functioning    Mental Health Status:  Mental Health Status: No Current Concerns       Chemical Dependency Status:  Chemical Dependency Status: No Current Concerns             Values/Beliefs:  Spiritual, Cultural Beliefs, Alevism Practices, Values that affect care: no               Additional Information:    SW met with patient at bedside and introduced self/role. SW completed Initial Assessment. Please see above  for assessment information.    Patient just had a recent stay at [facility]. From there, he discharged to [facility]. Patient returned to the hospital yesterday but is now ready for discharge. Fortunately, [facility] has a bed today so patient will be returning to [facility]. Please see this writer's discharge note from today for full discharge details.        HUMPHREY Elam, LSW  8 Med Surg   10U beds: -  [facility]  Phone: [phone]  Pager: [phone]

## 2023-12-29 NOTE — H&P
[facility]   Acute Rehabilitation Unit  Admission History and Physical    CHIEF COMPLAINT   Left leg weakness     HISTORY OF PRESENT ILLNESS  Tc Garcia is a 83 year old right hand dominant male with a relevant PMH of DM2 on insulin pump (history of DKA), paroxysmal atrial fibrillation on eliquis, HTN, HLD, CKD stage 3, recurrent pleural effusion, and history of spontaneous intracranial hemorrhage who sustained a left intraparenchymal hematoma after a fall on 12/11/23.  He was sitting at the table and became dizzy, sustaining a fall with a head injury. He thinks the injury was mild and denies loss of consciousness.  Head CT showed a 1.5x3cm left inferior cerebellar hematoma.  CT also noted a 0.1x0.3cm area of left medial frontal lobe diffusion restriction.  Afterward, he developed left sided weakness, lower extremity more than upper extremity, as well as increased blurriness of vision.  He denies any sensory changes, cognitive changes, difficulties with speech or swallow, or changes in hearing.  He also denies bowel or bladder changes, being volitional in both.  Hospital course was significant for dysphagia, hyper and hypotension, labile glusose and DKA, nausea, ABBIE, and UTI.    He was previously admitted to the ARU on 12/27/23, but the morning afterward he had FSG values of 300-400 with CO2 of 30, so he was sent to the ED for DKA.  On discussing with the patient, he thinks the reason is that he tried to manage his own insulin with the machine but the injector didn't work, so he thinks he just wasn't getting the insulin administered.  He was asymptomatic during this time.  On transfer to the ED, no acidosis was noted.  He was given D5NS and Endocrinology was consulted to adjust his insulin.     He was readmitted to the ARU on 12/29/23 after blood glucose stabilized with the same functional status as previous.  Endocrinology expects to follow with injectable insulin until 1/2,  when he will transition to his pump.     Functionally, the patient is mod assist for transfers, ambulation, lower body dressing, and toilet transfers.  Supervision with bed mobility, sitting, feeding, and grooming.     Currently, the patient is medically appropriate and is assessed to have needs and will benefit from an inpatient acute rehabilitation comprehensive program working with PT and OT and will also benefit from supervision and management of Rehab Nursing and Rehab MD.       PAST MEDICAL HISTORY  Past Medical History:   Diagnosis Date    Anemia     Anemia of chronic disease 5/14/2020    Back pain     CKD (chronic kidney disease) stage 3, GFR 30-59 ml/min (H)     CRF (chronic renal failure), stage 3  5/14/2020    Fall 11/2019    suffered multiple left rib fractures, C3 and T2 fractures    Mixed hyperlipidemia 7/7/2004    Paroxysmal atrial fibrillation (H)     Personal history of colonic polyps 1972    gets colonoscopy every five years, due in 2006    Pleural effusion on left 11/2019    after multiple rib fractures    Pulmonary hypertension (H) 5/10/2020    Added automatically from request for surgery [id number]    Recurrent Left Pleural effusion -- S/P Pleurex Cath 5/12/20 12/30/2019    Rosacea 1989    Type II or unspecified type diabetes mellitus without mention of complication, not stated as uncontrolled 1999    Unspecified essential hypertension 1994       SURGICAL HISTORY  Past Surgical History:   Procedure Laterality Date    ANESTHESIA CARDIOVERSION N/A 2/3/2020    Procedure: ANESTHESIA, FOR CARDIOVERSION;  Surgeon: GENERIC ANESTHESIA PROVIDER;  Location:  OR     RIGHT HEART CATH MEASUREMENTS RECORDED N/A 5/13/2020    Procedure: Right Heart Cath;  Surgeon: Senthil Silva MD;  Location:  HEART CARDIAC CATH LAB    HC REMOVE TONSILS/ADENOIDS,<13 Y/O      T & A <11y.o.    IR CHEST TUBE DRAIN TUNNELED LEFT  5/12/2020    IR CHEST TUBE PLACEMENT NON-TUNNELLED LEFT  7/11/2020    IR CHEST TUBE REMOVAL  NON TUNNELED LEFT  2020    IR CHEST TUBE REMOVAL TUNNELED LEFT  2020    LAPAROSCOPIC CHOLECYSTECTOMY N/A 2020    Procedure: CHOLECYSTECTOMY, LAPAROSCOPIC;  Surgeon: Annette Lambert MD;  Location:  OR    LAPAROSCOPIC HERNIORRHAPHY INGUINAL BILATERAL Bilateral 10/27/2020    Procedure: LAPAROSCOPIC BILATERAL INGUINAL HERNIA REPAIR WITH MESH;  Surgeon: Brian Garsia MD;  Location:  OR       SOCIAL HISTORY  Marital Status: , lives with spouse  Living situation: House, 3 steps to enter with railing, greater than 10 stairs to second floor, but with a chair lift in the first 2 steps.  He believes he will be able to transfers to the chair lift provided he can ascend two steps.  Family support: family can provide 1 person assistance  Tobacco use: The patient reports that he quit smoking about 15 years ago. His smoking use included cigarettes. He started smoking about 56 years ago. He has a 8 pack-year smoking history. He has never used smokeless tobacco.  Alcohol use: The patient reports that he does not currently use alcohol after a past usage of about 35.0 standard drinks of alcohol per week.    FAMILY HISTORY  Family History   Problem Relation Age of Onset    Family History Negative Mother          age 71    Family History Negative Father          age 70    Diabetes Brother         alive age 77    Diabetes Brother         alive age 76    Family History Negative Brother             Family History Negative Brother             Diabetes Sister         alive age74    Family History Negative Sister          age 86    Heart Disease Sister             Family History Negative Sister             Family History Negative Sister             Diabetes Sister     Diabetes Sister     Diabetes Brother     Diabetes Brother        PRIOR FUNCTIONAL HISTORY   Pt was independent with all ADLs/IADLs, transfers, mobility and gait.       MEDICATIONS  Medications Prior to Admission   Medication Sig Dispense Refill Last Dose    acetaminophen (TYLENOL) 325 MG tablet Take 2 tablets (650 mg) by mouth every 6 hours as needed for mild pain or fever  0     amLODIPine (NORVASC) 10 MG tablet Take 1 tablet (10 mg) by mouth daily       carvedilol (COREG) 12.5 MG tablet Take 1 tablet (12.5 mg) by mouth 2 times daily (with meals)       cefTRIAXone (ROCEPHIN) 1 GM vial Inject 1 g into the vein every 24 hours       Continuous Blood Gluc  (DEXCOM G6 ) JOSE Dexcom G6    USE EACH WITH 10 DAYS SENSORS       enoxaparin ANTICOAGULANT (LOVENOX) 30 MG/0.3ML syringe Inject 0.3 mLs (30 mg) Subcutaneous every 24 hours       glucose 40 % (400 mg/mL) gel Take 15-30 g by mouth every 15 minutes as needed for low blood sugar       insulin lispro (HUMALOG) 100 UNIT/ML Cartridge Inject 1 Units Subcutaneous 3 times daily (before meals)       insulin lispro (HUMALOG) 100 UNIT/ML Cartridge Inject 1-4 Units Subcutaneous 2 times daily (before meals)       insulin lispro (HUMALOG) 100 UNIT/ML Cartridge Inject 1-7 Units Subcutaneous 3 times daily (before meals)       insulin lispro (HUMALOG) 100 UNIT/ML Cartridge Inject 1-5 Units Subcutaneous 2 times daily (before meals)       insulin lispro (HUMALOG) 100 UNIT/ML vial 1 units per 7 grams of carbohydrate with breakfast       insulin lispro (HUMALOG) 100 UNIT/ML vial 1 units per 10 grams of carbohydrate with supper       INSULIN PUMP - OUTPATIENT Medtronic device with no CGM and site change every 3 days   Sensitivity factor (midnight to midnight): 55  Bolus (units:gram): 8390-4058 = 1:9, 2265-7687 = 1:7, 3644-4060 = 1:7, 7209-9949 = 1:9, 6936-9187 = 1:13  Basal (units/hour): 9944-6534 = 0.45, 4860-2850 = 0.5, 7535-8085 = 0.625, 8846-8659 = 0.6, 2218-2060 = 0.45       irbesartan (AVAPRO) 300 MG tablet Take 1 tablet (300 mg) by mouth At Bedtime 90 tablet 3     isosorbide mononitrate (IMDUR) 60 MG 24 hr tablet Take 1  "tablet (60 mg) by mouth every evening       melatonin 10 MG TABS tablet Take 1 tablet (10 mg) by mouth nightly as needed for sleep       melatonin 10 MG TABS tablet Take 1 tablet (10 mg) by mouth at bedtime       senna-docusate (SENOKOT-S/PERICOLACE) 8.6-50 MG tablet Take 1-2 tablets by mouth 2 times daily as needed for constipation       sodium chloride, PF, 0.9% PF flush 3 mLs by Intracatheter route every 8 hours       tamsulosin (FLOMAX) 0.4 MG capsule Take 0.4 mg by mouth every morning       traMADol (ULTRAM-ER) 100 MG 24 hr tablet Take 1 tablet (100 mg) by mouth daily as needed for moderate to severe pain 6 tablet 0     [DISCONTINUED] insulin glargine (LANTUS PEN) 100 UNIT/ML pen Inject 9 Units Subcutaneous every 24 hours       [DISCONTINUED] torsemide (DEMADEX) 10 MG tablet Take 1 tablet (10 mg) by mouth as needed (For weight gain greater than 3 lbs overnight or increased swelling and/or shortness of breath) 90 tablet 3        ALLERGIES  Allergies   Allergen Reactions    No Known Drug Allergy        REVIEW OF SYSTEMS  A 10 point ROS was performed and negative unless otherwise noted in HPI.     PHYSICAL EXAM  VITAL SIGNS:  There were no vitals taken for this visit.  BMI:  Estimated body mass index is 20.37 kg/m  as calculated from the following:    Height as of 12/28/23: 1.753 m (5' 9.02\").    Weight as of 12/28/23: 62.6 kg (138 lb 0.1 oz).     General: NAD, pleasant and cooperative  HEENT: ATNC, oropharynx clear with MMM, EOMI, PERRL  Pulmonary: clear breath sounds b/l, no rales/wheezing  Cardiovascular: RRR  Abdominal: soft, non-tender, non-distended  Extremities: warm, well perfused, no edema in bilateral lower extremities, no tenderness in calves  MSK/neuro:  Mental Status:  alert and oriented x3  Cranial Nerves: grossly symmetric  Sensory: Symmetric to light touch in bilateral upper and lower extremities   Strength: 5/5 in all muscle groups of the upper and lower extremities  Abnormal movements: " None    LABS AND IMAGING  Recent Labs   Lab Test 12/29/23  1345 12/29/23  0745 12/29/23  0533 12/13/23  0321 12/12/23  2223 07/12/21  0000 06/21/21  0000 11/08/19  0658 11/07/19  0714   NA  --   --  139   < > 137   < > 139   < > 147*   POTASSIUM  --   --  3.9   < > 4.6   < > 4.8   < > 4.2   CHLORIDE  --   --  103   < > 100   < > 103   < > 114*   CO2  --   --  27   < > 25   < > 28   < > 30   BUN  --   --  19.7   < > 22.4   < > 29*   < > 28   CR  --   --  1.43*   < > 1.63*   < > 1.58*   < > 1.71*   *   < > 403*   < > 375*   < > 246*   < > 78   GFRESTIMATED  --   --  48*   < > 41*   < > 40*   < > 37*   GFRESTBLACK  --   --   --   --   --   --  46*   < > 43*   AST  --   --  9   < >  --    < >  --    < > 198*   ALT  --   --  <5   < >  --    < >  --    < > 568*   GGT  --   --   --   --   --   --   --   --  34   ALKPHOS  --   --  69   < >  --    < >  --    < > 94   BILITOTAL  --   --  0.3   < >  --    < >  --    < > 1.8*   CT  --   --   --   --  15*  --   --   --   --     < > = values in this interval not displayed.     Recent Labs   Lab Test 12/29/23  0533   WBC 13.2*   RBC 3.60*   HGB 10.0*   MCV 87   MCH 27.8   MCHC 31.8   RDW 17.8*        Recent Labs   Lab Test 12/11/23  1419   INR 1.35*     Imaging reviewed.    ASSESSMENT/PLAN:  Tc Garcia is a 83 year old right hand dominant male with a PMH of DM2 with DKA, paroxysmal atrial fibrillation formerly on apixaban, hypertension, hyperlipidemia, pelural effusion, CKD stage 3, BPH, and ACD who sustained a left cerebellar intracranial hemorrhage from a fall on 12/11/23 with a possible left medial frontal lobe infarct on imaging.  His deficits include decreased visual acuity and mild left hemiparesis. He was admitted to acute inpatient rehabilitation on 12/27/23.     Impairment group code: 02.22 Traumatic, Closed Injury; Fall with resulting intracranial hemorrhage     Rehabilitation Plan     PT, OT, and SLP on a daily basis, in addition to rehab nursing and  close management of physiatrist.    Impairment of ADL's: OT for 60 minutes daily to work on ADL re-training such as grooming, self cares and bathing.    Impairment of mobility:  PT for 60 minutes daily to work on neuromuscular re-education focusing on strength, balance, coordination, and endurance.  SLP for 60 minutes to advance diet to improve PO intake.  Rehab RN for med administration, bowel regimen, glucose monitoring and wound care.      Medical Conditions     Rehab  #Neurological LLE weakness  #Traumatic brain injury  - PT, OT, SLP     #Bowel  - PRN bowel meds ordered     #Bladder  - bladder screening     #Skin  - Assess for presence of any pressure injury     #Sleep  - melatonin 10mg bedtime     Neuro  #Intraparenchymal hematoma    #Mild hydrocephalus   #H/o spontaneous intracranial hemorrhage (1/2023)  paroxysmal atrial fibrillation (on eliquis prior to admission)  Presented with dizziness diplopia and nausea. Workup found 1.5 X 3 cm intraparenchymal hematoma centered in inferior cerebellar vermis with likely extension into fourth ventricle; no hydrocephalus. Etiology ruled likely hypertensive in setting of chronic anticoagulation after fall.    - follow up with stroke neurology 6-8 weeks from 12/27     #Chronic C7 compression fracture  No dynamic instability.     CV  #Chronic diastolic CHF  #Atrial fibrillation PTA on Eliquis  #Hypertension  #Hyperlipidemia  Previously on apixaban, held after ICH. Anticoagulation was reversed and held. Echocardiogram with EF of 60 to 65%.  Severe biatrial enlargement.  Mild to moderate TR.  - lovenox ppx  - stroke neurology in 6-8 weeks  - BP goal systolic 130-150  - continue amlodipine  - continue carvedilol  - continue avapro  - hold PTA demadex  - follow up with cardiology in 1 month to discuss Watchman     Endo  #Diabetic ketoacidosis  #Diabetes mellitus type II HgbA1c 8.1% 12/12/23  Patient's insulin pump had been on hold since admission in the setting of IP. Blood  sugars labile during hospital stay. Patient went into DKA on 12/24/2023.  Likely triggered by infection with rise in the WBC count hence pump discontinued and patient switched to insulin drip per DKA protocol. DKA resolved on 12/25/2023, but then he had another episode of likely DKA on morning of 12/28/23 which may have been due to patient confusion about use of pump / injectable insulin  - lantus glargine 12 units subq bedtime  - aspart 1 unit per 7 g carb breakfast  - aspart 1 unit per 7 g carb lunch  - aspart 1 unit per 10 g carb dinner  - aspart 1 unit per 10 g carb snack  - med sliding scale insulin  - endo will follow for education to transition back to insulin pump on 1/2/24  - no orders have been written for insulin pump now for clarity     Pulm  #Possible aspiration pneumonia.  Agitation, leukocytosis on 12/12 to 12/13. No growth blood cultures.  CXR R > L lower lung opacity.  Completed IV ceftriaxone - cefuroxime course.  - CXR in ~ 4 weeks (~1/27/24)  - Incentive spirometry  - aspiration precautions     ID  #Presumed sepsis likely due to UTI  Patient went into DKA with rising white count and mildly positive UA.  Of note, patient had just completed treatment for blood pneumonia UTI, question if this was not completely treated.  Urine culture with no growth. Started on vanc/zosyn and narrowed to ceftriaxone 12/27/23  - Complete 7 day total course of abx with ceftriaxone to end 1/2/24     Renal  #CKD Stage 3  Baseline creatinine 1-1.1  - continue irbesartan  - consider readding torsemide prn for edema     #Anemia of chronic disease  - monitor     FEN/GI  #Diet  - Minced moist and thin liquids (dentures)  - consistent carb 60g  - to advance on avice of SLP     Disposition  Code status: DNR/DNI  ELOS: 12 days  Rehab Prognosis: good     #Follow Up  - Primary care provider, Jessika Cordoba in 1-2 weeks from discharge  - Stroke neurology in 6-8 weeks from 12/27/23  - cardiology in 1 month from  12/27/23  - CXR in ~ 1/27/24     Senthil Red MD    I spent a total of 40 minutes face-to-face with Tc Garcia during today's visit doing history, exam, counseling, and coordination of care. Over 50% of this time was spent counseling the patient and/or coordinating care.

## 2023-12-29 NOTE — PLAN OF CARE
Goal Outcome Evaluation:      Plan of Care Reviewed With: patient    Overall Patient Progress: improvingOverall Patient Progress: improving    Outcome Evaluation: Patient will discharge back to [facility] [place] today. Please see this writer's other notes from today for more details.

## 2023-12-30 NOTE — PROGRESS NOTES
Diabetes Consult Daily  Progress Note          Assessment/Plan:   Tc Garcia is a 83 year old right hand dominant male with a relevant PMH of DM1 on insulin pump (history of DKA), paroxysmal atrial fibrillation on eliquis, HTN, HLD, CKD stage 3, recurrent pleural effusion, and history of spontaneous intracranial hemorrhage who sustained a left intraparenchymal hematoma after a fall on 12/11/23, and found to have left medial frontal lobe diffusion restriction with sequelae of  left sided weakness, lower extremity more than upper extremity, as well as increased blurriness of vision.     Insulin Dependent type 1 diabetes    - Decrease Novolog cho 1/9 breakfast and 1/10 for lunch and dinner.  - aspart medium  correction   - glargine  12 units every morning, Change to Lantus 10 units tomorrow at 9 am.  - pump restart on TUESDAY (perhaps mid morning)  - hypoglycemia protocol  - education needs : formal pump assessment/support for restart. Consult ordered       Outpatient diabetes follow up: Dr Asif at [facility] of Endocrinology  Plan discussed with patient, bedside RN/ via this ntoe.           Interval History:     The last 24 hours progress and nursing notes reviewed.  C02=27, AG=8. Creat stable at 1.41    Previous provider increased lantus 12 units too much and maybe too much  carb coverage so decreased that.  He is having more activity so maybe why BG are lower.  .          Recent Labs   Lab 12/30/23  0744 12/30/23  0621 12/30/23  0208 12/29/23  2157 12/29/23  2108 12/29/23  1812   * 202* 148* 133* 72 98          Medications impacting glycemia:   no steroid        Nutrition:     Orders Placed This Encounter      Combination Diet Moderate Consistent Carb (60 g CHO per Meal) Diet; Minced and Moist Diet (level 5); Thin Liquids (level 0)            PTA Regimen:   INSULIN PUMP -  Medtronic minimed  Sensitivity factor (midnight to midnight): 60  Bolus (units:gram): 3202-5793 = 1:9,  "2117-6934 = 1:7, 4720-2799 = 1:7, 2047-5097 = 1:9, 6550-9586 = 1:13   Basal Rates and Start/End times:   Rate #1 =  0.45 units/hr from 0000 to 0200.   Rate #2 = 0.45 units/hr from 0201 to 0600.   Rate #3 = 0.625 units/hr from 0601 to 0900.   Rate #4 = 0.625 units/hr from 0901 to  1700   Rate #5 = 0.50 units/hr from 1701 to 2359.      Maintain blood glucose level within a specified target range  9002-8107 110-150  1207-0520 100-120  9800-2711 100-140    Active insulin time 4 hours               Review of Systems:   See interval hx            Physical Exam:   Blood pressure (!) 146/82, pulse 70, temperature 97.8  F (36.6  C), temperature source Oral, resp. rate 16, height 1.753 m (5' 9\"), weight 64 kg (141 lb), SpO2 95%.    Gen: Alert, in NAD   HEENT: NC/AT,  hearing intact to conversational volume  Resp: Unlabored, remains on room air  Neuro: oriented x3, communicating clearly  Psych: calm mood, easily engaged         Data:     Lab Results   Component Value Date    A1C 8.1 12/12/2023    A1C 8.0 04/20/2023    A1C 7.2 01/17/2023    A1C 7.7 11/20/2020    A1C 7.4 07/08/2020    A1C 8.2 04/25/2020    A1C 7.7 10/31/2019    A1C 7.2 06/30/2009          ROUTINE IP LABS (Last four results)  BMP  Recent Labs   Lab 12/30/23  1852 12/30/23  1705 12/30/23  1341 12/30/23  1059 12/30/23  0744 12/30/23  0621 12/29/23  0745 12/29/23  0533 12/28/23  1517 12/28/23  1455 12/28/23  1143 12/28/23  0848   NA  --   --   --   --   --  138  --  139  --  143  --  139  139   POTASSIUM  --   --   --   --   --  3.7  --  3.9  --  4.7  --  3.6  3.6   CHLORIDE  --   --   --   --   --  102  --  103  --  106  --  101  101   LLOYD  --   --   --   --   --  8.3*  --  8.6*  --  8.7*  --  8.9  8.9   CO2  --   --   --   --   --  27  --  27  --  30*  --  25  25   BUN  --   --   --   --   --  14.7  --  19.7  --  19.7  --  21.4  21.4   CR  --   --   --   --   --  1.41*  --  1.43*  --  1.46*  --  1.56*  1.56*   GLC 71 92 76 113*   < > 202*   < > 403*   < > " 127*   < > 347*  347*    < > = values in this interval not displayed.     CBC  Recent Labs   Lab 12/30/23  0621 12/29/23  0533 12/28/23  0848 12/28/23  0600   WBC 11.2* 13.2* 11.6* 11.0   RBC 3.49* 3.60* 3.80* 3.80*   HGB 9.5* 10.0* 10.4* 10.5*   HCT 29.6* 31.4* 32.8* 33.1*   MCV 85 87 86 87   MCH 27.2 27.8 27.4 27.6   MCHC 32.1 31.8 31.7 31.7   RDW 17.9* 17.8* 17.5* 17.7*    258 260 258     INRNo lab results found in last 7 days.  Lab Results   Component Value Date    AST 9 12/29/2023    AST 12 03/22/2021     Lab Results   Component Value Date    ALT <5 12/29/2023    ALT 5 03/22/2021     Lab Results   Component Value Date    BILICONJ 0.0 09/06/2006      Lab Results   Component Value Date    BILITOTAL 0.3 12/29/2023    BILITOTAL 0.5 03/22/2021     Lab Results   Component Value Date    ALBUMIN 3.4 12/29/2023    ALBUMIN 4.4 01/15/2023    ALBUMIN 4.0 03/22/2021     Lab Results   Component Value Date    PROTTOTAL 5.3 12/29/2023    PROTTOTAL 5.6 03/22/2021      Lab Results   Component Value Date    ALKPHOS 69 12/29/2023    ALKPHOS 62 03/22/2021       50 minutes spent on the date of the encounter doing chart review, history and exam, coordinating care/communication, documentation and further activities per the note.    Corin Huertas PA-C  Inpatient Diabetes Service  Pager   [phone]  Available by Kenan  Date of Service: 12/30/2023      To contact Endocrinology Diabetes Management service:   From [phone]: Kenan or page inpatient diabetes provider that is following the patient that day (see filed or incomplete progress notes/consult notes).  If uncertain of provider assignment: page job code [phone].  For nursing questions or updates from [phone], please first page the primary team.  Primary team provider will then reach out to the on-call endocrinology fellow as needed.    Please notify inpatient diabetes service if changes are planned that will impact glycemia, such as changes to steroids, enteral feeding,  parenteral feeding, dextrose fluids or procedures requiring prolonged NPO status.

## 2023-12-30 NOTE — PLAN OF CARE
"Discharge Planner Post-Acute Rehab SLP:     Discharge Plan: Home    Precautions: Fall    Current Status:  Hearing: WFL  Vision: Glasses  Communication: WFL  Cognition: OT recommending SLP consult for cognitive assessment   Swallow: Easy to chew (7)/0, no straws    Assessment:  Pt seen at bedside during noon meal, including roasted turkey breast, mashed potatoes, banana, orange, and sugar cookie. Pt exhibits mildly prolonged but functional mastication. Pt with top dentures and 5 anterior teeth. Complete breakdown with meats. At baseline with oranges, pt has always expectorated the pulp. With apples, pt conumes them after peeling. Pt consumes raw broccoli and cauliflower at home. 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 Deficits include reduced oral bolus control and AP bolus transit, delayed swallow response (with larger bolus size by straw), reduced BOT retraction, reduced hyolaryngeal excursion, reduced pharyngeal constriction and UES dysfunction. These deficits resulted in deep laryngeal penetration of thin liquid (by straw) before the swallow due to delayed response. Most of penetrated material was ejected from laryngeal vestibule with swallows, although trace residue remained in laryngeal vestibule after swallow. This material appeared to clear given cued cough. No laryngeal penetration or aspiration occurred with small single sips thin liquid by cup or spoon, with slightly thick, mildly thick or puree consistency. Recommend advance liquids to thin consistency (IDDSI 0) and continue puree textures (IDDSI 4) given 1:1 supervision and swallow straetgies (fully upright position during and 30-60 minutes after PO intake, small single sips/bites, slow pace, NO STRAWS, periodically cough/clear throat on purpose). Please serve pills one at a time. Please use small size cup (not large cup stocked on unit). Monitor for signs/symptoms of " "aspiration and hold PO if occur.\" Solid consistencies were not attempted during VFSS. Pt demonstrated ability to consumed regular solids and thin liquids during CSE but will benefit from further analysis with regular solids prior to advancement. Recommend advancement to Easy to Chew (7) solids, continue thin liquids without straw. Pt reports limited use of straws prior to hospitalization. Pt will be seen by SLP for follow up with regular solids for use of swallow strategies and further advancement as pt is able. SLP provided education to pt regarding POC and diet advancement. Pt voices comprehension and agreement.     Other Barriers to Discharge (Family Training, etc): Family training, as indicated     "

## 2023-12-30 NOTE — PROGRESS NOTES
Patient was admitted today from med surg  at 1630. Alert and oriented x4 . Able to make needs known. Assist x1 with walker and gait belt.Continent of bowel and bladder.  Pt is diabetic and on carb count coverage. BS before dinner was  65, snack given and ate dinner. Ate 51 grams of carbs then blood sugar checked after 10 min from eating = 98. Carb coverage held per rehab doctor on call. Blood sugar at HS =72. Snack given. Recheck =133 .Skin tear on the left wrist . Pt on IV antibiotic every 24hrs. Wit R forearm PIV, saline locked, patent. Denied pain throughout the shift. With mepilex on sacrum/coccyx area for protection/blanchable redness.

## 2023-12-30 NOTE — PLAN OF CARE
Goal Outcome Evaluation:    Outcome Evaluation: Pt AxO, able to make needs known. Pleasant and cooperative with cares and assessments. VSS, denied pain. Now on regular texture, thin liquids diet, no straws. Able to take his pills whole with thin liquids. BG checks done, insulin given per order. Pt has own CGM at bedside and said the machine was beeping, he got a 64 BG result. 2 OJ 4oz cups given. Fingerstick BG check was 76. Given 2 more 4 0z cups of orange juice and a PB&J sandwich. Pt said his CGM result went up to 96. A1 with the gait belt and walker. Continent of bowel and bladder, able to use the bathroom. No BM reported this AM shift. Participated in therapies. Endorsed accordingly to incoming NOD for BG monitoring.

## 2023-12-30 NOTE — PROGRESS NOTES
"  [facility]   Acute Rehabilitation Unit  Daily progress note    INTERVAL HISTORY  Tc Garcia was seen and examined at bedside.  No acute events overnight.  Blood sugars better controled but will need close monitoring.  Denies chest pain, shortness of breath, no fever or chills.  Motivated to work with therapy.       Functionally, evals today       ROS: 10 point ROS neg other than the symptoms noted above in the HPI.        MEDICATIONS  Scheduled meds    amLODIPine  10 mg Oral Daily     carvedilol  12.5 mg Oral BID w/meals     cefTRIAXone  1 g Intravenous Q24H     enoxaparin ANTICOAGULANT  30 mg Subcutaneous Q24H     insulin aspart   Subcutaneous Daily with breakfast     insulin aspart   Subcutaneous Daily with lunch     insulin aspart   Subcutaneous Daily with supper     insulin aspart  1-7 Units Subcutaneous TID AC     insulin aspart  1-5 Units Subcutaneous At Bedtime     insulin glargine  12 Units Subcutaneous QAM AC     irbesartan  300 mg Oral At Bedtime     isosorbide mononitrate  60 mg Oral QPM     sodium chloride (PF)  3 mL Intracatheter Q8H     tamsulosin  0.4 mg Oral QAM       PRN meds:  acetaminophen, glucose **OR** dextrose **OR** glucagon, insulin aspart, - MEDICATION INSTRUCTIONS -, senna-docusate      PHYSICAL EXAM  BP (!) 146/82 (BP Location: Left arm, Patient Position: Semi-Bain's, Cuff Size: Adult Regular)   Pulse 70   Temp 97.8  F (36.6  C) (Oral)   Resp 16   Ht 1.753 m (5' 9\")   Wt 64 kg (141 lb)   SpO2 95%   BMI 20.82 kg/m    Gen: NAD, up in bed  HEENT: NCAT, EOMI  Cardio: 2+ radial pulse, well perfused  Pulm: non labored, on RA, symmetrical chest rise  Abd: soft, nontender  Extremities: warm, well perfused, no edema in bilateral lower extremities, no tenderness in calves  MSK/neuro:  Mental Status:  alert and oriented x3  Cranial Nerves: grossly symmetric  Sensory: Symmetric to light touch in bilateral upper and lower extremities   Strength: 5/5 in " all muscle groups of the upper and lower extremities  Abnormal movements: None    LABS    Lab Results   Component Value Date    WBC 11.2 12/30/2023    WBC 10.4 06/15/2021     Lab Results   Component Value Date    RBC 3.49 12/30/2023    RBC 4.08 06/15/2021     Lab Results   Component Value Date    HGB 9.5 12/30/2023    HGB 11.7 06/15/2021     Lab Results   Component Value Date    HCT 29.6 12/30/2023    HCT 36.3 06/15/2021     Lab Results   Component Value Date    MCV 85 12/30/2023    MCV 89 06/15/2021     Lab Results   Component Value Date    MCH 27.2 12/30/2023    MCH 28.7 06/15/2021     Lab Results   Component Value Date    MCHC 32.1 12/30/2023    MCHC 32.2 06/15/2021     Lab Results   Component Value Date    RDW 17.9 12/30/2023    RDW 14.2 06/15/2021     Lab Results   Component Value Date     12/30/2023     06/15/2021     Last Comprehensive Metabolic Panel:  Lab Results   Component Value Date     12/30/2023    POTASSIUM 3.7 12/30/2023    CHLORIDE 102 12/30/2023    CO2 27 12/30/2023    ANIONGAP 9 12/30/2023    GLC 76 12/30/2023    BUN 14.7 12/30/2023    CR 1.41 (H) 12/30/2023    GFRESTIMATED 49 (L) 12/30/2023    LLOYD 8.3 (L) 12/30/2023         ASSESSMENT AND PLAN  Tc Garcia is a 83 year old right hand dominant male with a PMH of DM2 with DKA, paroxysmal atrial fibrillation formerly on apixaban, hypertension, hyperlipidemia, pelural effusion, CKD stage 3, BPH, and ACD who sustained a left cerebellar intracranial hemorrhage from a fall on 12/11/23 with a possible left medial frontal lobe infarct on imaging.  His deficits include decreased visual acuity and mild left hemiparesis. He was admitted to acute inpatient rehabilitation on 12/27/23.     Impairment group code: 02.22 Traumatic, Closed Injury; Fall with resulting intracranial hemorrhage     Rehabilitation Plan     PT, OT, and SLP on a daily basis, in addition to rehab nursing and close management of physiatrist.    Impairment of ADL's: OT  for 60 minutes daily to work on ADL re-training such as grooming, self cares and bathing.    Impairment of mobility:  PT for 60 minutes daily to work on neuromuscular re-education focusing on strength, balance, coordination, and endurance.  SLP for 60 minutes to advance diet to improve PO intake.  Rehab RN for med administration, bowel regimen, glucose monitoring and wound care.      Medical Conditions     Rehab  #Neurological LLE weakness  #Traumatic brain injury  - PT, OT, SLP     #Bowel  - PRN bowel meds ordered     #Bladder  - bladder screening     #Skin  - Assess for presence of any pressure injury     #Sleep  - melatonin 10mg bedtime     Neuro  #Intraparenchymal hematoma    #Mild hydrocephalus   #H/o spontaneous intracranial hemorrhage (1/2023)  paroxysmal atrial fibrillation (on eliquis prior to admission)  Presented with dizziness diplopia and nausea. Workup found 1.5 X 3 cm intraparenchymal hematoma centered in inferior cerebellar vermis with likely extension into fourth ventricle; no hydrocephalus. Etiology ruled likely hypertensive in setting of chronic anticoagulation after fall.    - follow up with stroke neurology 6-8 weeks from 12/27     #Chronic C7 compression fracture  No dynamic instability.     CV  #Chronic diastolic CHF  #Atrial fibrillation PTA on Eliquis  #Hypertension  #Hyperlipidemia  Previously on apixaban, held after ICH. Anticoagulation was reversed and held. Echocardiogram with EF of 60 to 65%.  Severe biatrial enlargement.  Mild to moderate TR.  - lovenox ppx  - stroke neurology in 6-8 weeks  - BP goal systolic 130-150  - continue amlodipine  - continue carvedilol  - continue avapro  - hold PTA demadex  - follow up with cardiology in 1 month to discuss Watchman     Endo  #Diabetic ketoacidosis  #Diabetes mellitus type II HgbA1c 8.1% 12/12/23  Patient's insulin pump had been on hold since admission in the setting of [facility]. Blood sugars labile during hospital stay. Patient went into DKA on  12/24/2023.  Likely triggered by infection with rise in the WBC count hence pump discontinued and patient switched to insulin drip per DKA protocol. DKA resolved on 12/25/2023, but then he had another episode of likely DKA on morning of 12/28/23 which may have been due to patient confusion about use of pump / injectable insulin  - lantus glargine 12 units subq bedtime  - aspart 1 unit per 7 g carb breakfast  - aspart 1 unit per 7 g carb lunch  - aspart 1 unit per 10 g carb dinner  - aspart 1 unit per 10 g carb snack  - med sliding scale insulin  - endo will follow for education to transition back to insulin pump on 1/2/24  - no orders have been written for insulin pump now for clarity     Pulm  #Possible aspiration pneumonia.  Agitation, leukocytosis on 12/12 to 12/13. No growth blood cultures.  CXR R > L lower lung opacity.  Completed IV ceftriaxone - cefuroxime course.  - CXR in ~ 4 weeks (~1/27/24)  - Incentive spirometry  - aspiration precautions     ID  #Presumed sepsis likely due to UTI  Patient went into DKA with rising white count and mildly positive UA.  Of note, patient had just completed treatment for blood pneumonia UTI, question if this was not completely treated.  Urine culture with no growth. Started on vanc/zosyn and narrowed to ceftriaxone 12/27/23  - Complete 7 day total course of abx with ceftriaxone to end 1/2/24     Renal  #CKD Stage 3  Baseline creatinine 1-1.1  - continue irbesartan  - consider readding torsemide prn for edema     #Anemia of chronic disease  - monitor     FEN/GI  #Diet  - Minced moist and thin liquids (dentures)  - consistent carb 60g  - to advance on avice of SLP     Disposition  Code status: DNR/DNI  ELOS: 12 days  Rehab Prognosis: good     #Follow Up  - Primary care provider, Jessika Cordoba in 1-2 weeks from discharge  - Stroke neurology in 6-8 weeks from 12/27/23  - cardiology in 1 month from 12/27/23  - CXR in ~ 1/27/24       Charlie Paez,   Physical  Medicine & Rehabilitation    I spent a total of 25 minutes face to face and coordinating care of Tc Garcia.  Over 50% of my time on the unit was spent counseling the patient and /or coordinating care regarding post TBI.

## 2023-12-30 NOTE — PLAN OF CARE
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   Feet: doff socks sba r sock mod a assit starting over foot pt leaning to L with task with assist of therapsit to right, L sock don sba     Bathing: tba  Toileting: tba  IADLs: further assessment  Vision/Cognition: further assessment    Assessment: pt83 year old right hand dominant male with a PMH of DM2 with DKA, paroxysmal atrial fibrillation formerly on apixaban, hypertension, hyperlipidemia, pelural effusion, CKD stage 3, BPH, and ACD who sustained a left cerebellar intracranial hemorrhage from a fall on 12/11/23 with a possible left medial frontal lobe infarct on imaging. 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

## 2023-12-30 NOTE — PROGRESS NOTES
12/30/23 0650   Appointment Info   Signing Clinician's Name / Credentials (OT) Debi Evans OTR_L   Living Environment   People in Home spouse   Living Environment Comments House, 3 steps to enter with railing, greater than 10 stairs to second floor, but with a chair lift in the first 2 steps.  He believes he will be able to transfers to the chair lift provided he can ascend two steps. standard toilet with handrail on one side , and counter on other tub shower combo with chair in tub and hand rail on front wall, flat bed. 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   Self-Care   Usual Activity Tolerance good   Current Activity Tolerance fair   Instrumental Activities of Daily Living (IADL)   Previous Responsibilities meal prep;housekeeping;shopping;driving;yardwork;medication management;finances  (know he will not be driving anymore)   Post-Acute Assessment Only   Post-Acute Functional Assessment See below   Previous Level of Function/Home Environm   Bathing, Previous Functional Level independent   Grooming, Previous Functional Level independent   Dressing, Previous Functional Level independent   Eating/Feeding, Previous Functional Level independent   Toileting, Previous Functional Level independent   BADLs, Previous Functional Level independent   IADLs, Previous Functional Level independent   Bed Mobility, Previous Functional Level independent   Transfers, Previous Functional Level independent  (shower)   Household Ambulation, Previous Functional Level independent   Stairs, Previous Functional Level uses device or equipment  (chair lift to bedroom level)   Community Ambulation, Previous Functional Level independent   Functional Cognition, Previous Functional Level wfl   General Information   Onset of Illness/Injury or Date of Surgery 12/11/23   Referring Physician Jeannine Guerra   Patient/Family Therapy Goal Statement (OT) to return home   Additional Occupational Profile Info/Pertinent History of Current  Problem per md [id number] year old right hand dominant male with a PMH of DM2 with DKA, paroxysmal atrial fibrillation formerly on apixaban, hypertension, hyperlipidemia, pelural effusion, CKD stage 3, BPH, and ACD who sustained a left cerebellar intracranial hemorrhage from a fall on 12/11/23 with a possible left medial frontal lobe infarct on imaging.  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, mod  safety judement noted with adls and transfers at ot eval   Visual Perception   Visual Impairment/Limitations corrective lenses for reading   Sensory   Sensory Quick Adds sensation intact   Pain Assessment   Patient Currently in Pain No   Posture   Posture kyphosis   Range of Motion Comprehensive   General Range of Motion no range of motion deficits identified   Strength Comprehensive (MMT)   Comment, General Manual Muscle Testing (MMT) Assessment l mild weakness 4/5 mmg r 4+/5mmg   Coordination   Coordination Comments assess l fine motor and  functional for adls   Clinical Impression   Criteria for Skilled Therapeutic Interventions Met (OT) Yes, treatment indicated   OT Diagnosis decrease adls aND iADLS   OT Problem List-Impairments impacting ADL problems related to;activity tolerance impaired;balance;mobility;sensation;vision;postural control   Planned Therapy Interventions (OT) ADL retraining;IADL retraining;strengthening;visual perception;transfer training;home program guidelines;progressive activity/exercise;risk factor education;fine motor coordination training;neuromuscular re-education   Clinical Decision Making Complexity (OT) detailed assessment/moderate complexity   Clinical Impression Comments pt83 year old  right hand dominant male with a PMH of DM2 with DKA, paroxysmal atrial fibrillation formerly on apixaban, hypertension, hyperlipidemia, pelural effusion, CKD stage 3, BPH, and ACD who sustained a left cerebellar intracranial hemorrhage from a fall on 12/11/23 with a possible left medial frontal lobe infarct on imaging. 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   OT Total Evaluation Time   OT Sami, Moderate Complexity Minutes (20128) 15   OT Goals   Therapy Frequency (OT) 6 times/week   OT Predicted Duration/Target Date for Goal Attainment 01/12/24   OT Goals Hygiene/Grooming;Upper Body Dressing;Lower Body Dressing;Upper Body Bathing;Lower Body Bathing;Bed Mobility;Transfers;Toilet Transfer/Toileting;Meal Preparation;Home Management;Cognition   OT: Hygiene/Grooming modified independent   OT: Upper Body Dressing Modified independent   OT: Lower Body Dressing Modified independent   OT: Upper Body Bathing Modified independent   OT: Lower Body Bathing Modified independent   OT: Bed Mobility Modified independent   OT: Transfer Modified independent  (shower)   OT: Toilet Transfer/Toileting Modified independent   OT: Meal Preparation Modified independent   OT: Home Management Modified independent   OT: Cognitive Patient/caregiver will verbalize understanding of cognitive assessment results/recommendations as needed for safe discharge planning   Self-Care/Home Management   Self-Care/Home Mgmt/ADL, Compensatory, Meal Prep Minutes (68075) 45   Treatment Detail/Skilled Intervention ot see below for adl status   OT Discharge Planning   OT Plan ot shower w/c to extended tubbench pt leans to l when crossing leg over other   Total Session Time   Timed Code Treatment Minutes 45   Total Session Time (sum of timed and untimed services) 60   Post Acute Settings  Only   What unit is patient on? Acute Rehab   OT - Acute Rehab Center Time   Individual Time (minutes) - OT 60   Group Time (minutes) - OT 0   Concurrent Time (minutes) - OT 0   Co-Treatment Time (minutes) - OT 0   ARC Total Session Time (minutes) - OT 60   ARC Daily Total Session Time   OT ARC Daily Total Session Time 60   ARC Daily Rehab Total Minutes 60   Upper Body Dressing   Describe performance doff zipper long sleeve min a due to I v donned min a with clothing orientation and over his head  doff don short sleve shirt sba   Lower Body Dressing (Pants/Undergarments)   Describe performance doff/don shorts lob while standing to adjut pants down therapist assisted in righting  pt.   Lower Body Dressing putting on/taking off footwear   Describe performance doff socks sba r sock mod a assit starting over foot pt leaning to L with task with assist of therapsit to right, L sock don sba   Toilet Transfer   Describe performance min a with fww and cues for walker placement   Chair/bed-to-chair Transfer   Describe performance min a   Roll Left and Right   Comment sba   Lying to Sitting on Side of Bed   Comment FLAT BED SBA WITH BED RAIL EXITING TO l   Sit to Stand   Comment cga   Walk 10 Feet   Comment with fww min a assist of second for IV pole

## 2023-12-30 NOTE — PLAN OF CARE
Goal Outcome Evaluation:    Patient alert and oriented, able to make needs known, uses the call light. Appeared asleep on nursing rounds. Respirations even and non-labored. No pain or discomfort reported overnight. No concerns voiced thus far. No acute issues. Continent of bladder using the toilet. PVR completed 62 and 69. No BM this shift. BG was 148. Fall precautions maintained, call light in reach, safety checks completed.    Continue with POC.

## 2023-12-30 NOTE — PHARMACY-MEDICATION REGIMEN REVIEW
Pharmacy Medication Regimen Review  Tc Garcia is a 83 year old male who is currently in the Acute Rehab Unit.    Assessment: All medications have an appropriate indications, durations and no unnecessary use was found    Plan:   Continue current medications as ordered  Consider adding hold parameters to carvedilol, irbesartan, amlodipine, and isosorbide mononitrate.     Attending provider will be sent this note for review.  If there are any emergent issues noted above, pharmacist will contact provider directly by phone.      Pharmacy will periodically review the resident's medication regimen for any PRN medications not administered in > 72 hours and discontinue them. The pharmacist will discuss gradual dose reductions of psychopharmacologic medications with interdisciplinary team on a regular basis.    Please contact pharmacy if the above does not answer specific medication questions/concerns.    Background:  A pharmacist has reviewed all medications and pertinent medical history today.  Medications were reviewed for appropriate use and any irregularities found are listed with recommendations.      Current Facility-Administered Medications:     acetaminophen (TYLENOL) tablet 650 mg, 650 mg, Oral, Q6H PRN, Senthil Red MD    amLODIPine (NORVASC) tablet 10 mg, 10 mg, Oral, Daily, Senthil Red MD, 10 mg at 12/30/23 0758    carvedilol (COREG) tablet 12.5 mg, 12.5 mg, Oral, BID w/meals, Senthil Red MD, 12.5 mg at 12/30/23 0758    cefTRIAXone (ROCEPHIN) 1 g vial to attach to  mL bag for ADULTS or NS 50 mL bag for PEDS, 1 g, Intravenous, Q24H, Senthil Red MD, 1 g at 12/30/23 0923    glucose gel 15-30 g, 15-30 g, Oral, Q15 Min PRN **OR** dextrose 50 % injection 25-50 mL, 25-50 mL, Intravenous, Q15 Min PRN **OR** glucagon injection 1 mg, 1 mg, Subcutaneous, Q15 Min PRN, Senthil Red MD    enoxaparin ANTICOAGULANT (LOVENOX) injection 30 mg, 30 mg, Subcutaneous, Q24H, Neda  Sentihl Song MD    insulin aspart (NovoLOG) injection (RAPID ACTING), , Subcutaneous, Daily with breakfast, Senthil Red MD, 3 Units at 12/30/23 0801    insulin aspart (NovoLOG) injection (RAPID ACTING), , Subcutaneous, Daily with lunch, Corin Huertas PA-C    insulin aspart (NovoLOG) injection (RAPID ACTING), , Subcutaneous, Daily with supper, Senthil Red MD    insulin aspart (NovoLOG) injection (RAPID ACTING), , Subcutaneous, With Snacks or Supplements, Senthil Red MD    insulin aspart (NovoLOG) injection (RAPID ACTING), 1-7 Units, Subcutaneous, TID AC, Senthil Red MD, 3 Units at 12/30/23 0746    insulin aspart (NovoLOG) injection (RAPID ACTING), 1-5 Units, Subcutaneous, At Bedtime, Senthil Red MD    insulin glargine (LANTUS PEN) injection 12 Units, 12 Units, Subcutaneous, QAM AC, Senthil Red MD, 12 Units at 12/30/23 0746    irbesartan (AVAPRO) tablet 300 mg, 300 mg, Oral, At Bedtime, Senthil Red MD, 300 mg at 12/29/23 2106    isosorbide mononitrate (IMDUR) 24 hr tablet 60 mg, 60 mg, Oral, QPM, Senthil Red MD, 60 mg at 12/29/23 2106    Patient is already receiving anticoagulation with heparin, enoxaparin (LOVENOX), warfarin (COUMADIN)  or other anticoagulant medication, , Does not apply, Continuous PRN, Senthil Red MD    senna-docusate (SENOKOT-S/PERICOLACE) 8.6-50 MG per tablet 1-2 tablet, 1-2 tablet, Oral, BID PRN, Senthil Red MD    sodium chloride (PF) 0.9% PF flush 3 mL, 3 mL, Intracatheter, Q8H, Charlie Paez, DO, 3 mL at 12/30/23 0654    tamsulosin (FLOMAX) capsule 0.4 mg, 0.4 mg, Oral, QAM, Senthil Red MD, 0.4 mg at 12/30/23 0758  No current outpatient prescriptions on file.   PMH: DM2 on insulin pump (history of DKA), paroxysmal atrial fibrillation on eliquis, HTN, HLD, CKD stage 3, recurrent pleural effusion, and history of spontaneous intracranial hemorrhage   Jeremi Valadez PharmD

## 2023-12-30 NOTE — PLAN OF CARE
Discharge Planner Post-Acute Rehab PT:     Discharge Plan: home with home care vs OP PT TBA; 3 steps with rail R to enter his home (has stair lifts inside).  He cares for his wife who has dementia; she is able to assist physically if needed per pt.     Precautions: fall, alarms    Current Status:  Bed Mobility: cga  Transfer: cga to min A FWW  Gait: cga to min A FWW  Stairs: TBA   Balance: cga to min A with FWW formal assessment TBA    Outcome Measures:   TBA    Assessment:  Pt demonstrates impaired motor control, balance, gait, functional mobility following closed head injury s/p fall 12/11/23.  He is approprite for skilled PT to help him return home independently.  Pt has assist from his spouse as needed, but would like to return to his previous independent status.    Am: ambulated in hallway with cga to occasional min A, L sided ataxia/ motor control noted,  mild;  pm: not seen due to low BG of ~60; will continue to follow and progress      Other Barriers to Discharge (DME, Family Training, etc):   Fall risk, neuro involvement;  caregiver role for his spouse.

## 2023-12-30 NOTE — PROGRESS NOTES
12/30/23 5819   Appointment Info   Signing Clinician's Name / Credentials (PT) Edmond Whitehead, PT   Rehab Comments (PT) PT:  pt is resting comfortably, agreeable to PT, has been nauseated so he has the emesis bag near, but not acutely.  Also reports BG was low 65, but good this morning ~200.   Living Environment   Living Environment Comments Pt lives in a home with 3 steps to enter, rail on the L, then has a stair lift to navigate the upper and lower levels of his home;  3 steps to access the main level and stair lift;   Self-Care   Activity/Exercise/Self-Care Comment pt does not typically use a device, but states he feels best with a walker currently (has both FWW and 4WW at home). Pt's spouse can physically assist, she has dementia and pt would like to get home as soon as possible due to this.   Previous Level of Function/Home Environm   Bathing, Previous Functional Level independent   Grooming, Previous Functional Level independent   Dressing, Previous Functional Level independent   Eating/Feeding, Previous Functional Level independent   Toileting, Previous Functional Level independent   BADLs, Previous Functional Level independent   IADLs, Previous Functional Level independent   Bed Mobility, Previous Functional Level independent   Transfers, Previous Functional Level independent   Household Ambulation, Previous Functional Level independent   Stairs, Previous Functional Level independent;uses device or equipment  (mod independent, stair lift to 2nd level)   Community Ambulation, Previous Functional Level independent   Functional Cognition, Previous Functional Level pt reports he was indep, taking care of his wife who has dementia   Previous Level of Function independent without an A device   General Information   Pertinent History of Current Problem (include personal factors and/or comorbidities that impact the POC) Pt presents s/p fall and closed head injury (see MD notes for details) denies other residual  pain etc. reports ongoing nausea, impaired balance and control s/p fall.   Existing Precautions/Restrictions fall   General Observations pt reseing in bed with emesis bag near, denies pain.   Cognition   Cognitive Status Comments pt denies changes in memory or cognition;  defer to OT and SLP assessments; A and O for PT exam.   Pain Assessment   Patient Currently in Pain No   Integumentary/Edema   Integumentary/Edema Comments L wrist wound, nursing has it dressed and otherwise pt denies other skin issues.   Range of Motion (ROM)   ROM Comment WFL's for gait and transfers   Strength (Manual Muscle Testing)   Strength Comments B LE grossly 4+ to 5/5   Bed Mobility   Comment, (Bed Mobility) see GG coding   Transfers   Comment, (Transfers) see GG coding   Gait/Stairs (Locomotion)   Comment, (Gait/Stairs) see GG coding   Balance   Balance Comments cga to occasional min A with FWW   Sensory Examination   Sensory Perception Comments intact to light touch localization   Coordination   Coordination Comments impaired L UE on rapid supination pronation and toe taps supine/seated   Muscle Tone   Muscle Tone Comments no gross clonus or synergy noted on functional exam.   Clinical Impression   Criteria for Skilled Therapeutic Intervention Yes, treatment indicated   PT Diagnosis (PT) Pt demonstrates impaired motor control, balance, gait, functional mobility following closed head injury s/p fall 12/11/23.  He is approprite for skilled PT to help him return home independently.  Pt has assist from his spouse as needed, but would like to return to his previous independent status.   Influenced by the following impairments as in PT dx   Functional limitations due to impairments as in PT dx   Clinical Presentation (PT Evaluation Complexity) evolving   Clinical Presentation Rationale neurologic involvement   Clinical Decision Making (Complexity) moderate complexity   Planned Therapy Interventions (PT) balance training;bed mobility  training;gait training;groups;home exercise program;motor coordination training;neuromuscular re-education;patient/family education;postural re-education;ROM (range of motion);stair training;strengthening;stretching;transfer training;progressive activity/exercise;risk factor education;home program guidelines   Risk & Benefits of therapy have been explained evaluation/treatment results reviewed;care plan/treatment goals reviewed;risks/benefits reviewed;current/potential barriers reviewed;participants voiced agreement with care plan;participants included;patient   PT Total Evaluation Time   PT Eval, Moderate Complexity Minutes (17896) 30   Physical Therapy Goals   PT Frequency 6x/week   PT Predicted Duration/Target Date for Goal Attainment 01/12/24   PT Goals Bed Mobility;Transfers;Gait;Stairs;PT Goal 1;PT Goal 2   PT: Bed Mobility Modified independent;Supine to/from sit;Rolling   PT: Transfers Modified independent;Bed to/from chair;Sit to/from stand   PT: Gait Modified independent;Rolling walker;150 feet   PT: Stairs 3 stairs;Modified independent;Rail on right   PT: Goal 1 Pt to be mod indep with a medically appropriate HEP tolerating 30-60 min 3-5 x a week with a focus on standing balance, control   PT: Goal 2 Pt to be sba to mod I with advanced car transfer   Therapeutic Activity   Therapeutic Activities: dynamic activities to improve functional performance Minutes (18889) 15   Treatment Detail/Skilled Intervention PT: functional mobility  as below   PT Discharge Planning   PT Plan PT: continue GG due to low BG today; curb, stairs, car,  item,   Total Session Time   Timed Code Treatment Minutes 15   Total Session Time (sum of timed and untimed services) 45   Post Acute Settings Only   What unit is patient on? Acute Rehab   PT - Acute Rehab Center Time   Individual Time (minutes) - PT 45  (30 eval 15 TA)   Group Time (minutes) - PT 0   Concurrent Time (minutes) - PT 0   Co-Treatment Time (minutes) - PT 0    ARC Total Session Time (minutes) - PT 45   ARC Daily Total Session Time   PT ARC Daily Total Session Time 45   ARC Daily Rehab Total Minutes 45   Chair/bed-to-chair Transfer   Describe performance PT:  cga to min A stand pivot bed to chair   Complete independence for chair-bed-to-chair transfer no   Physical assistance for getting from chair-bed-to-chair Requires minimal assistance for getting from chair-bed-to-chair, less than or equal to 25% assist provided by staff   Roll Left and Right   Physical Assistance Level Contact guard assist   Roll Left to Right CARE Score 4   Sit to Lying   Physical Assistance Level Contact guard assist   Sit to Lying CARE Score 4   Lying to Sitting on Side of Bed   Physical Assistance Level Contact guard assist   Lying to Sitting CARE Score 4   Sit to Stand   Physical Assistance Level Less than 25%   Sitting to Standing CARE Score 3   Walk 10 Feet   Physical Assistance Level Less than 25%   Walk 10 Ft. CARE Score 3   Walk 50 Feet with Two Turns   Physical Assistance Level Less than 25%   Comment PT:  Cga to occasional min A, cues for slow smooth steps and pausing for safety as needed.   Walk 50 Ft. CARE Score 3   Walk 150 Feet   Comment PT: NT fatigue   Walking 10 Feet on Uneven Surfaces   Physical Assistance Level Less than 25%   Comment PT: cga to min A with FWW   Walking 10 Feet on Uneven Surfaces CARE Score 3   Wheel 50 Feet with Two Turns   Reason if not Attempted Activity not applicable   Wheel 50 Feet with Two Turns CARE Score 9   Wheel 150 Feet   Reason if not Attempted Activity not applicable   Wheel 150 Feet CARE Score 9   Stairs   Stairs Comment PT: tba NT fatigue   1 Step (Curb)   Comment PT: tba NT fatigue   4 Steps   Comment PT: tba NT fatigue   12 Steps   Comment PT: tba NT fatigue   Picking Up Object   Comment PT: tba NT fatigue   Car Transfer   Comment PT: tba NT fatigue   Psychosocial Support   Trust Relationship/Rapport care explained;choices provided;empathic  listening provided;questions answered;questions encouraged       Edmond Whitehead, PT  12/30/2023

## 2023-12-30 NOTE — PROGRESS NOTES
12/30/23 5929   Appointment Info   Signing Clinician's Name / Credentials (SLP) Sofiya Shi MS, CCC-SLP   General Information   Referring Physician Jeannine   General Observations Pt alert and agreeable to clinical swallow evaluation.   Type of Evaluation   Type of Evaluation Swallow Evaluation   Oral Motor   Oral Musculature generally intact   Mucosal Quality good   Dentition (Oral Motor)   Dentition (Oral Motor) significant number of missing teeth;dental appliance/dentures;other (see comments)  (Pt has 5 teeth (anteriorly))   Dental Appliance/Denture (Oral Motor) upper;adequate fit   Facial Symmetry (Oral Motor)   Facial Symmetry (Oral Motor) WNL   Lip Function (Oral Motor)   Comment, Lip Function (Oral Motor) WFL   Tongue Function (Oral Motor)   Comment, Tongue Function (Oral Motor) WFL   Jaw Function (Oral Motor)   Comment, Jaw Function (Oral Motor) WFL   Facial Sensation   Facial Sensation WNL   Cough/Swallow/Gag Reflex (Oral Motor)   Soft Palate/Velum (Oral Motor) WNL   Vocal Quality/Secretion Management (Oral Motor)   Vocal Quality (Oral Motor) WNL   General Swallowing Observations   Respiratory Support room air   Current Diet/Method of Nutritional Intake (General Swallowing Observations, NIS) minced and moist (dysphagia mechanically altered) (level 5);thin liquids (level 0)   Swallowing Evaluation Clinical swallow evaluation   Clinical Swallow Evaluation   Feeding Assistance set up only required   Clinical Swallow Evaluation Textures Trialed solid foods;soft & bite-sized;thin liquids   Clinical Swallow Eval: Thin Liquid Texture Trial   Mode of Presentation, Thin Liquids cup;straw   Volume of Liquid or Food Presented 3 oz sequential swallows   Oral Phase of Swallow WFL   Pharyngeal Phase of Swallow intact   Clinical Swallow Eval: Soft & Bite Sized   Mode of Presentation self-fed   Volume Presented 5 bites   Oral Phase WFL   Pharyngeal Phase intact   Clinical Swallow Evaluation: Solid Food Texture Trial    Mode of Presentation self-fed   Volume Presented 10 bites   Oral Phase WFL;other (see comments)  (Pt with top dentures and 5 anterior teeth.  Mastication is mildly prolonged but functional. Complete breakdown with meats. At baseline with oranges, pt has always expectorated the pulp. With apples, pt conumes them after peeling.)   Pharyngeal Phase intact   Esophageal Phase of Swallow   Patient reports or presents with symptoms of esophageal dysphagia No   Swallowing Recommendations   Diet Consistency Recommendations Easy to Chew (7);thin liquids (level 0)   Supervision Level for Intake distant supervision needed   Monitoring/Assistance Required (Eating/Swallowing) stop eating activities when fatigue is present;monitor for cough or change in vocal quality with intake   Recommended Feeding/Eating Techniques (Swallow Eval) encourage use of dentures;maintain upright posture during/after eating for 30 minutes;minimize distractions during oral intake;set-up and prepare tray   Instrumental Assessment Recommendations instrumental evaluation not recommended at this time   General Therapy Interventions   Planned Therapy Interventions Dysphagia Treatment   Dysphagia treatment Instruction of safe swallow strategies   Clinical Impression   Criteria for Skilled Therapeutic Interventions Met (SLP Eval) Yes, treatment indicated   Risks & Benefits of therapy have been explained evaluation/treatment results reviewed;care plan/treatment goals reviewed;risks/benefits reviewed;current/potential barriers reviewed;participants voiced agreement with care plan;participants included;patient   Clinical Impression Comments Pt seen at bedside during noon meal, including roasted turkey breast, mashed potatoes, banana, orange, and sugar cookie. Pt exhibits mildly prolonged but functional mastication. Pt with top dentures and 5 anterior teeth. Complete breakdown with meats. At baseline with oranges, pt has always expectorated the pulp. With  "apples, pt conumes them after peeling. Pt consumes raw broccoli and cauliflower at home. 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 Deficits include reduced oral bolus control and AP bolus transit, delayed swallow response (with larger bolus size by straw), reduced BOT retraction, reduced hyolaryngeal excursion, reduced pharyngeal constriction and UES dysfunction. These deficits resulted in deep laryngeal penetration of thin liquid (by straw) before the swallow due to delayed response. Most of penetrated material was ejected from laryngeal vestibule with swallows, although trace residue remained in laryngeal vestibule after swallow. This material appeared to clear given cued cough. No laryngeal penetration or aspiration occurred with small single sips thin liquid by cup or spoon, with slightly thick, mildly thick or puree consistency. Recommend advance liquids to thin consistency (IDDSI 0) and continue puree textures (IDDSI 4) given 1:1 supervision and swallow straetgies (fully upright position during and 30-60 minutes after PO intake, small single sips/bites, slow pace, NO STRAWS, periodically cough/clear throat on purpose). Please serve pills one at a time. Please use small size cup (not large cup stocked on unit). Monitor for signs/symptoms of aspiration and hold PO if occur.\" Solid consistencies were not attempted during VFSS. Pt demonstrated ability to consumed regular solids and thin liquids during CSE but will benefit from further analysis with regular solids prior to advancement. Recommend advancement to Easy to Chew (7) solids, continue thin liquids without straw. Pt reports limited use of straws prior to hospitalization. Pt will be seen by SLP for follow up with regular solids for use of swallow strategies and further advancement as pt is able. SLP provided education to pt regarding POC and diet advancement. Pt voices " comprehension and agreement.     SLP Total Evaluation Time   Eval: oral/pharyngeal swallow function, clinical swallow Minutes (67438) 60   SLP Goals   Therapy Frequency (SLP Eval) 6 times/week   SLP Predicted Duration/Target Date for Goal Attainment 01/06/24   SLP Goals SLP Goal 1   Interventions   Interventions Quick Adds Swallowing Dysfunction   SLP Discharge Planning   SLP Plan SLP: Follow up with regular diet x 1-2 sessions, ensure use of small bites/sips, slow rate, upright positioning during intake   SLP Discharge Recommendation home with assist   Total Session Time   Total Session Time (sum of timed and untimed services) 60   Post Acute Settings Only   What unit is patient on? Acute Rehab   SLP - Acute Rehab Center Time   Individual Time (minutes) - SLP 60   Group Time (minutes) - SLP 0   Concurrent Time (minutes) - SLP 0   ARC Total Session Time (minutes) - SLP 60   ARC Daily Total Session Time   SLP ARC Daily Total Session Time 60   ARC Daily Rehab Total Minutes 60

## 2023-12-31 NOTE — PROGRESS NOTES
Diabetes Consult Daily  Progress Note          Assessment/Plan:   Tc Garcia is a 83 year old right hand dominant male with a relevant PMH of DM1 on insulin pump (history of DKA), paroxysmal atrial fibrillation on eliquis, HTN, HLD, CKD stage 3, recurrent pleural effusion, and history of spontaneous intracranial hemorrhage who sustained a left intraparenchymal hematoma after a fall on 12/11/23, and found to have left medial frontal lobe diffusion restriction with sequelae of  left sided weakness, lower extremity more than upper extremity, as well as increased blurriness of vision.     1.Insulin Dependent type 1 diabetes  2. Labile BG    - Continue Novolog cho 1/9 breakfast and 1/10 for lunch and dinner and snacks.  - aspart medium  correction 1/50 with meals and snacks  - Decrease glargine 10 units today at 9 am.  - Patient wants to restart  on TUESDAY if possible  - hypoglycemia protocol  - education needs : formal pump assessment/support for restart. Consult ordered       Outpatient diabetes follow up: Dr Asif at [facility] Endocrinology  Plan discussed with patient, bedside RN/ via this ntoe.           Interval History:     The last 24 hours progress and nursing notes reviewed.  Last creat stable at 1.41 on 12/30    Much more activity with rehab so maybe BG lower yesterday.  BG=76 and 71 after carb coverage so will decrease carb coverage and then big rebound    After snack with apple juice  Decrease lantus 10 units and decrease carb coverage from 1:7  Recent Labs   Lab 12/31/23  1235 12/31/23  0731 12/31/23  0549 12/31/23  0157 12/30/23  2300 12/30/23  2115   * 360* 318* 205* 124* 85          Medications impacting glycemia:   no steroid        Nutrition:     Orders Placed This Encounter      Combination Diet Moderate Consistent Carb (60 g CHO per Meal) Diet; Easy to Chew (level 7); Thin Liquids (level 0)            PTA Regimen:   INSULIN PUMP -  [employer]  "minimed  Sensitivity factor (midnight to midnight): 60  Bolus (units:gram): 0975-3399 = 1:9, 0456-0991 = 1:7, 0574-6488 = 1:7, 7291-5675 = 1:9, 8212-4813 = 1:13   Basal Rates and Start/End times:   Rate #1 =  0.45 units/hr from 0000 to 0200.   Rate #2 = 0.45 units/hr from 0201 to 0600.   Rate #3 = 0.625 units/hr from 0601 to 0900.   Rate #4 = 0.625 units/hr from 0901 to  1700   Rate #5 = 0.50 units/hr from 1701 to 2359.      Maintain blood glucose level within a specified target range  3407-8229 110-150  1866-3397 100-120  2012-7354 100-140    Active insulin time 4 hours               Review of Systems:   See interval hx            Physical Exam:   Blood pressure 126/76, pulse 80, temperature 98.6  F (37  C), temperature source Oral, resp. rate 18, height 1.753 m (5' 9\"), weight 64 kg (141 lb), SpO2 97%.    Gen: Alert, in NAD   HEENT: NC/AT,  hearing intact to conversational volume  Resp: Unlabored, remains on room air  Neuro: oriented x3, communicating clearly  Psych: calm mood, easily engaged         Data:     Lab Results   Component Value Date    A1C 8.1 12/12/2023    A1C 8.0 04/20/2023    A1C 7.2 01/17/2023    A1C 7.7 11/20/2020    A1C 7.4 07/08/2020    A1C 8.2 04/25/2020    A1C 7.7 10/31/2019    A1C 7.2 06/30/2009          ROUTINE IP LABS (Last four results)  BMP  Recent Labs   Lab 12/31/23  1235 12/31/23  0731 12/31/23  0549 12/31/23  0157 12/30/23  0744 12/30/23  0621 12/29/23  0745 12/29/23  0533 12/28/23  1517 12/28/23  1455 12/28/23  1143 12/28/23  0848   NA  --   --   --   --   --  138  --  139  --  143  --  139  139   POTASSIUM  --   --   --   --   --  3.7  --  3.9  --  4.7  --  3.6  3.6   CHLORIDE  --   --   --   --   --  102  --  103  --  106  --  101  101   LLOYD  --   --   --   --   --  8.3*  --  8.6*  --  8.7*  --  8.9  8.9   CO2  --   --   --   --   --  27  --  27  --  30*  --  25  25   BUN  --   --   --   --   --  14.7  --  19.7  --  19.7  --  21.4  21.4   CR  --   --   --   --   --  1.41*  -- "  1.43*  --  1.46*  --  1.56*  1.56*   * 360* 318* 205*   < > 202*   < > 403*   < > 127*   < > 347*  347*    < > = values in this interval not displayed.     CBC  Recent Labs   Lab 12/30/23  0621 12/29/23  0533 12/28/23  0848 12/28/23  0600   WBC 11.2* 13.2* 11.6* 11.0   RBC 3.49* 3.60* 3.80* 3.80*   HGB 9.5* 10.0* 10.4* 10.5*   HCT 29.6* 31.4* 32.8* 33.1*   MCV 85 87 86 87   MCH 27.2 27.8 27.4 27.6   MCHC 32.1 31.8 31.7 31.7   RDW 17.9* 17.8* 17.5* 17.7*    258 260 258     INRNo lab results found in last 7 days.  Lab Results   Component Value Date    AST 9 12/29/2023    AST 12 03/22/2021     Lab Results   Component Value Date    ALT <5 12/29/2023    ALT 5 03/22/2021     Lab Results   Component Value Date    BILICONJ 0.0 09/06/2006      Lab Results   Component Value Date    BILITOTAL 0.3 12/29/2023    BILITOTAL 0.5 03/22/2021     Lab Results   Component Value Date    ALBUMIN 3.4 12/29/2023    ALBUMIN 4.4 01/15/2023    ALBUMIN 4.0 03/22/2021     Lab Results   Component Value Date    PROTTOTAL 5.3 12/29/2023    PROTTOTAL 5.6 03/22/2021      Lab Results   Component Value Date    ALKPHOS 69 12/29/2023    ALKPHOS 62 03/22/2021       35 minutes spent on the date of the encounter doing chart review, history and exam, coordinating care/communication, documentation and further activities per the note.    Corin Huertas PA-C  Inpatient Diabetes Service  Pager   [phone]  Available by Kenan  Date of Service: 12/31/2023      To contact Endocrinology Diabetes Management service:   From [id number]: Kenan or marcio inpatient diabetes provider that is following the patient that day (see filed or incomplete progress notes/consult notes).  If uncertain of provider assignment: page job code [phone].  For nursing questions or updates from [phone], please first page the primary team.  Primary team provider will then reach out to the on-call endocrinology fellow as needed.    Please notify inpatient diabetes service if  changes are planned that will impact glycemia, such as changes to steroids, enteral feeding, parenteral feeding, dextrose fluids or procedures requiring prolonged NPO status.

## 2023-12-31 NOTE — PLAN OF CARE
Goal Outcome Evaluation:    Patient slept throughout the night. Turns and repositions independently in bed. SpO2 was 90% on RA, placed on O2 2L via NC overnight, SpO2 satisfactory. BG 02:00 AM was 205. Patient called at 03:25 AM stating his dexacom is beeping, BG was 229, was checked again this morning using hospital glucometer with result of 318 - endorsed and MD notified via sticky note for BG trends overnight. R PIV patent and flushed as ordered. Fall precautions maintained, call light in reach, safety checks completed.     Continue with POC.

## 2023-12-31 NOTE — PLAN OF CARE
Discharge Planner Post-Acute Rehab SLP:     Discharge Plan: Home, is primary caregiver for wife    Precautions: Fall, alarms    Current Status:  Hearing: WFL  Vision: Glasses  Communication: WFL  Cognition: WNL per CLQT   Swallow: Easy to chew (7)/0, no straws    Assessment:  Pt seen in ARU for cognitive/linguistic evaluation following recent hospitalization for cerebellar CVA. OT reported concerns over cognitive function during previous day session. SLP administered the CLQT with pt scoring WNL across all domains compared to age norms. Pt does exhibit mildly slowed processing. Pt will benefit from further analysis of cognitive function with focus on tasks such as medication management, financial tasks. PLOF also included taking care of wife.     Other Barriers to Discharge (Family Training, etc): Level of assist/supervision with meds, financial management TBD

## 2023-12-31 NOTE — PLAN OF CARE
Discharge Planner Post-Acute Rehab SLP:     Discharge Plan: Home, is primary caregiver for wife    Precautions: Fall    Current Status:  Hearing: WFL  Vision: Glasses  Communication: WFL  Cognition: WNL per CLQT   Swallow: Easy to chew (7)/0, no straws    Assessment:  Head CT completed prior to session d/t increased left arm weakness. Pt seen sitting fully upright in bed for limited regular intake, pt consumed breakfast late and agreeable to red grapes only. Pt intiated upper denture placement prior to PO intake. Pt demo'd independently taking small, single bites of grapes (1-2 small red grapes at a time), small single sips of coffee. Pt demo'd functional mastication, no oral residue post-swallow. Facilitated discussion with pt regarding items he may consume at breakfast, SLP to follow up with regular breakfast tomorrow and advance solids as indicated.     Other Barriers to Discharge (Family Training, etc): Level of assist/supervision with meds, financial management TBD

## 2023-12-31 NOTE — CONSULTS
Social Work: Initial Assessment with Discharge Plan    Patient Name: Tc Garcia  : 1940  Age: 83 year old  MRN: [id number]  Completed assessment with: Chart review and interview with patient   Admitted to ARU: 23. Was previously admitted to ARU on 23, went to ED on 23, and then returned to ARU on 23.     Presenting Information   Date of SW assessment: 2023  Health Care Directive: not discussed  Primary Health Care Agent: Patient/self   Secondary Health Care Agent: ZAK  Living Situation:  Pt lives with wife in a house. Pt lives in a home with 3 steps to enter, rail on the L, then has a stair lift to navigate the upper and lower levels of his home;  3 steps to access the main level and stair lift.He believes he will be able to transfers to the chair lift provided he can ascend two steps. standard toilet with handrail on one side , and counter on other tub shower combo with chair in tub and hand rail on front wall, flat bed. 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  Previous Functional Status: Pt was independent with all ADLs/IADLs, transfers, mobility and gait.  Pt does not typically use a device, but states he feels best with a walker currently (has both FWW and 4WW at home). Pt's spouse can physically assist, she has dementia and pt would like to get home as soon as possible due to this.  Pt was driving prior but not anymore.   DME available: Has both FWW and 4WW at home  Patient and family understanding of hospitalization: appropriate   Cultural/Language/Spiritual Considerations: Pt is a 83 yr old male, , , english speaking, Restorationism john      Physical Health  Reason for admission: Tc Garcia is a 83 year old right hand dominant male with a relevant PMH of DM2 on insulin pump (history of DKA), paroxysmal atrial fibrillation on eliquis, HTN, HLD, CKD stage 3, recurrent pleural effusion, and history of spontaneous intracranial hemorrhage who sustained a  left intraparenchymal hematoma after a fall on 12/11/23.  He was sitting at the table and became dizzy, sustaining a fall with a head injury. He thinks the injury was mild and denies loss of consciousness.  Head CT showed a 1.5x3cm left inferior cerebellar hematoma.  CT also noted a 0.1x0.3cm area of left medial frontal lobe diffusion restriction.  Afterward, he developed left sided weakness, lower extremity more than upper extremity, as well as increased blurriness of vision.  He denies any sensory changes, cognitive changes, difficulties with speech or swallow, or changes in hearing.  He also denies bowel or bladder changes, being volitional in both.  Hospital course was significant for dysphagia, hyper and hypotension, labile glusose and DKA, nausea, ABBIE, and UTI.  He was previously admitted to the ARU on 12/27/23, but the morning afterward he had FSG values of 300-400 with CO2 of 30, so he was sent to the ED for DKA.  On discussing with the patient, he thinks the reason is that he tried to manage his own insulin with the machine but the injector didn't work, so he thinks he just wasn't getting the insulin administered.  He was asymptomatic during this time.  On transfer to the ED, no acidosis was noted.  He was given D5NS and Endocrinology was consulted to adjust his insulin.  He was readmitted to the ARU on 12/29/23 after blood glucose stabilized with the same functional status as previous.  Endocrinology expects to follow with injectable insulin until 1/2, when he will transition to his pump.  Functionally, the patient is mod assist for transfers, ambulation, lower body dressing, and toilet transfers.  Supervision with bed mobility, sitting, feeding, and grooming.  Currently, the patient is medically appropriate and is assessed to have needs and will benefit from an inpatient acute rehabilitation comprehensive program working with PT and OT and will also benefit from supervision and management of Rehab Nursing  and Rehab MD.     Provider Information   Primary Care Physician: AdalidJessika [phone], [place] [phone]  [facility] will schedule PCP apt at discharge.   : LINDSEY    Mental Health/Chemical Dependency:   Diagnosis: Pt denies mental health concerns.   Alcohol/Tobacco/Narcotis: Per chart review, The patient reports that he quit smoking about 15 years ago. His smoking use included cigarettes. He started smoking about 56 years ago. He has a 8 pack-year smoking history. He has never used smokeless tobacco. The patient reports that he does not currently use alcohol after a past usage of about 35.0 standard drinks of alcohol per week.   Support/Services in Place: NA  Services Needed/Recommended: Shayan and Health Psychology support while on ARU available. Pt would like shayan referral.   Sexuality/Intimacy: Not discussed     Support System  Marital Status:    Family support: Radha (wife), Camille Ji (daughter), Brittani (daughter), Leelee (son) lives in [place] [facility] [place].   Other support available: LINDSEY    Community Resources  Current in home services: NA  Previous services: [facility] - nurse visits and did vitals     Financial/Employment/Education  Employment Status: retired   Income Source: social security  Education: not discussed   Financial Concerns:  Pt reported a little concerned.   Insurance: MEDICA/[employer]     Discharge Plan   Patient and family discharge goal: Goal is to return home. TBD, pending progress  Provided Education on discharge plan: Evaluations and discharge recommendations pending.   Patient agreeable to discharge plan:  Pending further discussion. Evaluations and discharge recommendations pending.   Provided education and attained signature for Medicare IM and IRF Patient Rights and Privacy Information provided to patient : Yes  Provided patient with [facility] Resources: Yes. Need a referral?   Barriers to  "discharge: TBD    Discharge Recommendations   Disposition: See above   Transportation Needs: son-in-law and daughterCamille    Additional comments   Discharge TBD, HANNAH 12 days     BERTHA will remain available and continue to follow as needs arise.       -------------------------------------------------------------------------------------------------------------  ISAK Pain Assessment    Pain Effect on Sleep  Over the past 5 days, how much of the time has pain made it hard for you to sleep at night?\"    0. Does not apply - I have not had any pain or hurting in the past 5 days  0 - Does not apply - I have not had any pain or hurting in the past 5 days  1 - Rarely or nor at all  2 - Occasionally  3 - Frequently   4 - Almost Constantly  8 - Unable to Answer    Pain Interference with Therapy Activities  \"Over the past 5 days, how often have you limited your participation in rehabilitation therapy sessions due to pain?\"  1. Rarely or not at all  0 - Does not apply - I have not received rehabilitation therapy in the past 5 days  1 - Rarely or nor at all  2 - Occasionally  3 - Frequently   4 - Almost Constantly  8 - Unable to Answer    Pain Interference with Day-to-Day Activities  \"Over the past 5 days, how often have you limited your day-to-day activities (excluding rehabilitation therapy sessions) because of pain?\"  1. Rarely or not at all  1 - Rarely or nor at all  2 - Occasionally  3 - Frequently   4 - Almost Constantly  8 - Unable to Answer  -------------------------------------------------------------------------------------------------------------    Cheli Peterson [facility] HospitalBERTHA,   [facility]    Social Work  [address] [place] [id number]  [phone] or [phone])           "

## 2023-12-31 NOTE — PLAN OF CARE
Goal Outcome Evaluation: Alert and oriented x4 . Able to make needs known. Assist x1 with walker and gait belt.Continent of bowel and bladder.  Pt is diabetic and on carb count coverage. BS before dinner 92. Ate 45 grams of carb eaten and 4 units carb coverage administered. Rechecked  blood sugar after a hour=71. Snack given. Recheck =75. BG at HS 85 snack given. BG at 6900=180.

## 2023-12-31 NOTE — PROGRESS NOTES
[facility]   Acute Rehabilitation Unit  Daily progress note    INTERVAL HISTORY  Tc Garcia was seen and examined at bedside this AM.  NO acute events overnight.  This AM however stated his left hand felt profoundly weaker and unable to extend wrist, given hostory ordered STAT head CT which showed no acute changes.  Later in afternoon L hand and arm stated to feel better.  Discussed CVA recovery as well as elevated blood sugars impacting recovery.  Will continue to ariel in on better sugar control.  Patient denied chest pain, shortness of breath, no fever or chills.  Notified family of current issues.     Functional  OT:  ADLs:    Mobility: cga leaning to l with standing adls and sitting adls l/b    Grooming: sba    Dressing: G/H:    U/b dressing: doff zipper long sleeve min a due to I v donned min a with clothing orientation and over his head doff don short sleve shirt sba     L/B dressing: doff/don shorts lob while standing to adjut pants down therapist assisted in righting pt.     Feet: doff socks sba r sock mod a assit starting over foot pt leaning to L with task with assist of therapsit to right, L sock don sba          Bathing: tba    Toileting: tba  IADLs: further assessment  Vision/Cognition: further assessment       ROS: 10 point ROS neg other than the symptoms noted above in the HPI.        MEDICATIONS  Scheduled meds    amLODIPine  10 mg Oral Daily     carvedilol  12.5 mg Oral BID w/meals     cefTRIAXone  1 g Intravenous Q24H     enoxaparin ANTICOAGULANT  30 mg Subcutaneous Q24H     insulin aspart   Subcutaneous Daily with breakfast     insulin aspart   Subcutaneous Daily with lunch     insulin aspart   Subcutaneous Daily with supper     insulin aspart  1-7 Units Subcutaneous TID AC     insulin aspart  1-5 Units Subcutaneous At Bedtime     insulin glargine  10 Units Subcutaneous Q24H     irbesartan  300 mg Oral At Bedtime     isosorbide mononitrate  60 mg Oral QPM  "    sodium chloride (PF)  3 mL Intracatheter Q8H     tamsulosin  0.4 mg Oral QAM       PRN meds:  acetaminophen, glucose **OR** dextrose **OR** glucagon, insulin aspart, - MEDICATION INSTRUCTIONS -, senna-docusate      PHYSICAL EXAM  /76 (BP Location: Right arm)   Pulse 80   Temp 98.6  F (37  C) (Oral)   Resp 18   Ht 1.753 m (5' 9\")   Wt 64 kg (141 lb)   SpO2 97%   BMI 20.82 kg/m    Gen: NAD, up in bed  HEENT: NCAT, EOMI  Cardio: 2+ radial pulse, well perfused  Pulm: non labored, on RA, symmetrical chest rise  Abd: soft, nontender  Extremities: warm, well perfused, no edema in bilateral lower extremities, no tenderness in calves  MSK/neuro:  Mental Status:  alert and oriented x3  Cranial Nerves: grossly symmetric  Sensory: Symmetric to light touch in bilateral upper and lower extremities   Strength: 5/5 in all muscle groups of the upper and lower extremities on R, there was 2/5 with LUE myotomes this AM that improved to 4/5 in afternoon    LABS    Lab Results   Component Value Date    WBC 11.2 12/30/2023    WBC 10.4 06/15/2021     Lab Results   Component Value Date    RBC 3.49 12/30/2023    RBC 4.08 06/15/2021     Lab Results   Component Value Date    HGB 9.5 12/30/2023    HGB 11.7 06/15/2021     Lab Results   Component Value Date    HCT 29.6 12/30/2023    HCT 36.3 06/15/2021     Lab Results   Component Value Date    MCV 85 12/30/2023    MCV 89 06/15/2021     Lab Results   Component Value Date    MCH 27.2 12/30/2023    MCH 28.7 06/15/2021     Lab Results   Component Value Date    MCHC 32.1 12/30/2023    MCHC 32.2 06/15/2021     Lab Results   Component Value Date    RDW 17.9 12/30/2023    RDW 14.2 06/15/2021     Lab Results   Component Value Date     12/30/2023     06/15/2021     Last Comprehensive Metabolic Panel:  Lab Results   Component Value Date     12/30/2023    POTASSIUM 3.7 12/30/2023    CHLORIDE 102 12/30/2023    CO2 27 12/30/2023    ANIONGAP 9 12/30/2023     (H) " 12/31/2023    BUN 14.7 12/30/2023    CR 1.41 (H) 12/30/2023    GFRESTIMATED 49 (L) 12/30/2023    LLOYD 8.3 (L) 12/30/2023         ASSESSMENT AND PLAN  Tc Garcia is a 83 year old right hand dominant male with a PMH of DM2 with DKA, paroxysmal atrial fibrillation formerly on apixaban, hypertension, hyperlipidemia, pelural effusion, CKD stage 3, BPH, and ACD who sustained a left cerebellar intracranial hemorrhage from a fall on 12/11/23 with a possible left medial frontal lobe infarct on imaging.  His deficits include decreased visual acuity and mild left hemiparesis. He was admitted to acute inpatient rehabilitation on 12/27/23.     Impairment group code: 02.22 Traumatic, Closed Injury; Fall with resulting intracranial hemorrhage     Rehabilitation Plan     PT, OT, and SLP on a daily basis, in addition to rehab nursing and close management of physiatrist.    Impairment of ADL's: OT for 60 minutes daily to work on ADL re-training such as grooming, self cares and bathing.    Impairment of mobility:  PT for 60 minutes daily to work on neuromuscular re-education focusing on strength, balance, coordination, and endurance.  SLP for 60 minutes to advance diet to improve PO intake.  Rehab RN for med administration, bowel regimen, glucose monitoring and wound care.      Medical Conditions     Rehab  #Neurological LLE weakness  #Traumatic brain injury  - PT, OT, SLP     #Bowel  - PRN bowel meds ordered     #Bladder  - bladder screening     #Skin  - Assess for presence of any pressure injury     #Sleep  - melatonin 10mg bedtime     Neuro  #Intraparenchymal hematoma    #Mild hydrocephalus   #H/o spontaneous intracranial hemorrhage (1/2023)  paroxysmal atrial fibrillation (on eliquis prior to admission)  Presented with dizziness diplopia and nausea. Workup found 1.5 X 3 cm intraparenchymal hematoma centered in inferior cerebellar vermis with likely extension into fourth ventricle; no hydrocephalus. Etiology ruled likely  hypertensive in setting of chronic anticoagulation after fall.    - follow up with stroke neurology 6-8 weeks from 12/27  --repeat STAT CT head this AM for left hand weakness showed no new bleed or acute issues.      #Chronic C7 compression fracture  No dynamic instability.     CV  #Chronic diastolic CHF  #Atrial fibrillation PTA on Eliquis  #Hypertension  #Hyperlipidemia  Previously on apixaban, held after ICH. Anticoagulation was reversed and held. Echocardiogram with EF of 60 to 65%.  Severe biatrial enlargement.  Mild to moderate TR.  - lovenox ppx  - stroke neurology in 6-8 weeks  - BP goal systolic 130-150  - continue amlodipine  - continue carvedilol  - continue avapro  - hold PTA demadex  - follow up with cardiology in 1 month to discuss Rony Camargo  #Diabetic ketoacidosis  #Diabetes mellitus type II HgbA1c 8.1% 12/12/23  Patient's insulin pump had been on hold since admission in the setting of IPH. Blood sugars labile during hospital stay. Patient went into DKA on 12/24/2023.  Likely triggered by infection with rise in the WBC count hence pump discontinued and patient switched to insulin drip per DKA protocol. DKA resolved on 12/25/2023, but then he had another episode of likely DKA on morning of 12/28/23 which may have been due to patient confusion about use of pump / injectable insulin  - lantus glargine 12 units subq bedtime  - aspart 1 unit per 7 g carb breakfast  - aspart 1 unit per 7 g carb lunch  - aspart 1 unit per 10 g carb dinner  - aspart 1 unit per 10 g carb snack  - med sliding scale insulin  - endo will follow for education to transition back to insulin pump on 1/2/24  - no orders have been written for insulin pump now for clarity     Pulm  #Possible aspiration pneumonia.  Agitation, leukocytosis on 12/12 to 12/13. No growth blood cultures.  CXR R > L lower lung opacity.  Completed IV ceftriaxone - cefuroxime course.  - CXR in ~ 4 weeks (~1/27/24)  - Incentive spirometry  - aspiration  precautions     ID  #Presumed sepsis likely due to UTI  Patient went into DKA with rising white count and mildly positive UA.  Of note, patient had just completed treatment for blood pneumonia UTI, question if this was not completely treated.  Urine culture with no growth. Started on vanc/zosyn and narrowed to ceftriaxone 12/27/23  - Complete 7 day total course of abx with ceftriaxone to end 1/2/24     Renal  #CKD Stage 3  Baseline creatinine 1-1.1  - continue irbesartan  - consider readding torsemide prn for edema     #Anemia of chronic disease  - monitor     FEN/GI  #Diet  - Minced moist and thin liquids (dentures)  - consistent carb 60g  - to advance on avice of SLP     Disposition  Code status: DNR/DNI  ELOS: 12 days  Rehab Prognosis: good     #Follow Up  - Primary care provider, Jessika Cordoba in 1-2 weeks from discharge  - Stroke neurology in 6-8 weeks from 12/27/23  - cardiology in 1 month from 12/27/23  - CXR in ~ 1/27/24       Charlie Paez, DO  Physical Medicine & Rehabilitation    I spent a total of 35 minutes face to face and coordinating care of Tc Garcia.  Over 50% of my time on the unit was spent counseling the patient and /or coordinating care regarding post TBI.

## 2023-12-31 NOTE — PROGRESS NOTES
[facility]    Medicine Progress Note - Hospitalist Service    Date of Admission:  12/29/2023    Assessment & Plan   Tc Garcia is a 83 year old male with a history of paroxysmal atrial fibrillation on Eliquis, pulmonary hypertension, diabetes mellitus type I, who was initially admitted to [facility] 12/11/2023-12/27 for intracranial hemorrhage after fall. Patient was transferred to [facility] ARU for further rehabilitation, but was noted to have significant hyperglycemia in the setting of likely pump failure, thus was sent back to [facility] 12/28- 12/29. Endocrinology was involved and assisted with insulin regimen. Patient stable for transfer back to the ARU 12/29. Medicine was consulted for assistance with blood sugar management, endo also following.     Changes Today:   - Following blood sugars (recheck delayed as patient need stat CT per PM&R due to weakness)     Hyperglycemia, improving  Ketosis, resolved  Diabetes Mellitus Type I  Insulin pump initially held at [facility] due to intraparenchymal hematoma.  Was restarted on 12/23/2023 but was found to be in DKA the following day.  DKA did resolve by 12/25/2023.  He was transitioned to Lantus during his hospitalization, and then discharged to acute rehab on his insulin pump. Arrived to ARU hyperglycemic and subsequently transferred to the ED. Found to have ketones, but no evidence of DKA. Etiology of hyperglycemia was likely pump failure due to malpositioning of pump. Endocrinology was consulted and restarted subcutaneous insulin regimen. Patient was transferred back to the ARU and endocrinology planning on following closely and likely will restart insulin pump 1/2.   - Endocrinology following  - Insulin regimen for now until pump restarted  - Lantus 10 units every morning  - Carb coverage of 1 per 7 grams  - Medium intensity correction scale      Intraparenchymal hematoma   H/o spontaneous intracranial hemorrhage  (1/2023)  paroxysmal atrial fibrillation (on eliquis prior to admission)  Presented with dizziness diplopia and nausea to [facility] on 12/11. Found to have intraparenchymal hematoma on imaging. Etiology for intraparenchymal hematoma likely hypertensive and in the setting of chronic anticoagulation with Eliquis versus traumatic after sudden onset of dizziness and fall. Anticoagulation was reversed with Kcentra in ED and he was admitted to the ICU for close monitoring. Neurosurgery was consulted, but patient did not wish for any surgical intervention, thus signed off.   - Follow up with cardiology in one month  - Follow up with stroke neurology in 6-8 weeks      UTI due to Klebsiella pneumoniae  Possible aspiration pneumonia.  Noted to have agitation and confusion on 12/12 &12/13. Work-up with leukocytosis and concern for UTI on UA and aspiration pneumonia on CXR. Urine culture positive for Klebsiella pneumonia. Start on ceftriaxone, subsequently switched to cefuroxime to complete course. Noted to have lactic acidosis again on 12/24, restarted on broad spectrum antibiotics and eventually discharged on IV ceftriaxone (will complete 12/31).  - Continue IV ceftriaxone for now (complete 12/31).     Chronic diastolic CHF  Atrial fibrillation PTA on Eliquis.  Hypertension.  Hyperlipidemia.  Prior to admission Eliquis on hold since admission to [facility]. Most recent echocardiogram with EF of 60 to 65%.  Severe biatrial enlargement.  Mild to moderate TR. Telemetry  with atrial fibrillation.  - Continue PTA carvedilol  - Continue Avapro   - Hold PTA Demadex, cardiology recently advised changing to as needed dosing  - Follow-up with cardiology after discharge, discuss watchman procedure      Stable/Resolved/Chronic Medical Conditions:  CKD stage III Baseline creatinine ~1.5. Avoid nephrotoxins.  Chronic C7 compression fracture Seen by neurosurgery on 12/12 who stated that fracture likely healed and given that x-ray  showed no dynamic instability, no surgical intervention or C collar needed.             Diet: Room Service  Combination Diet Moderate Consistent Carb (60 g CHO per Meal) Diet; Easy to Chew (level 7); Thin Liquids (level 0)    DVT Prophylaxis: Enoxaparin (Lovenox) SQ  Pruett Catheter: Not present  Lines: None     Cardiac Monitoring: None  Code Status: No CPR- Do NOT Intubate      Clinically Significant Risk Factors              # Hypoalbuminemia: Lowest albumin = 3.4 g/dL at 12/29/2023  5:33 AM, will monitor as appropriate     # Hypertension: Noted on problem list    # Chronic heart failure with preserved ejection fraction: heart failure noted on problem list and last echo with EF >50%      # DMII: A1C = 8.1 % (Ref range: <5.7 %) within past 6 months       # Financial/Environmental Concerns:           Disposition Plan   Per PM&R       The patient's care was discussed with the Bedside Nurse and Patient.    Jaylyn Odonnell PA-C  Hospitalist Service  [facility]  Securely message with [employer] (more info)  Text page via [employer] Paging/Directory   ______________________________________________________________________    Interval History   Patient feeling ok. Hyperglycemic this morning, which he states he can feel. Awaiting insulin from nurse.     Physical Exam   Vital Signs: Temp: 98.6  F (37  C) Temp src: Oral BP: 126/76 Pulse: 80   Resp: 18 SpO2: 97 % O2 Device: Nasal cannula Oxygen Delivery: 2 LPM  Weight: 141 lbs 0 oz    General Appearance: Comfortable, nontoxic appearing male seen laying in bed. Aid at bedside.   Eyes: PERRLA.  No conjunctival icterus.  HEENT: Atraumatic.  Respiratory: Breathing comfortably on room air.    Skin: No lesions or rashes noted on exposed skin.  Musculoskeletal: Moving all extremities spontaneously.  Neurologic: Cranial nerves II through XII grossly intact.  Psychiatric: Mood appropriate.      Medical Decision Making     35 MINUTES SPENT BY ME on  the date of service doing chart review, history, exam, documentation & further activities per the note.      Data   Recent Labs   Lab 12/31/23  0731 12/31/23  0549 12/31/23  0157 12/30/23  0744 12/30/23  0621 12/29/23  0745 12/29/23  0533 12/28/23  1517 12/28/23  1455 12/28/23  1143 12/28/23  0848   WBC  --   --   --   --  11.2*  --  13.2*  --   --   --  11.6*   HGB  --   --   --   --  9.5*  --  10.0*  --   --   --  10.4*   MCV  --   --   --   --  85  --  87  --   --   --  86   PLT  --   --   --   --  234  --  258  --   --   --  260   NA  --   --   --   --  138  --  139  --  143  --  139  139   POTASSIUM  --   --   --   --  3.7  --  3.9  --  4.7  --  3.6  3.6   CHLORIDE  --   --   --   --  102  --  103  --  106  --  101  101   CO2  --   --   --   --  27  --  27  --  30*  --  25  25   BUN  --   --   --   --  14.7  --  19.7  --  19.7  --  21.4  21.4   CR  --   --   --   --  1.41*  --  1.43*  --  1.46*  --  1.56*  1.56*   ANIONGAP  --   --   --   --  9  --  9  --  7  --  13  13   LLOYD  --   --   --   --  8.3*  --  8.6*  --  8.7*  --  8.9  8.9   * 318* 205*   < > 202*   < > 403*   < > 127*   < > 347*  347*   ALBUMIN  --   --   --   --   --   --  3.4*  --   --   --  3.4*   PROTTOTAL  --   --   --   --   --   --  5.3*  --   --   --  5.2*   BILITOTAL  --   --   --   --   --   --  0.3  --   --   --  0.4   ALKPHOS  --   --   --   --   --   --  69  --   --   --  70   ALT  --   --   --   --   --   --  <5  --   --   --  <5   AST  --   --   --   --   --   --  9  --   --   --  9    < > = values in this interval not displayed.

## 2023-12-31 NOTE — PROGRESS NOTES
12/31/23 0757   Appointment Info   Signing Clinician's Name / Credentials (SLP) Sofiya Shi MS, CCC-SLP   General Information   Onset of Illness/Injury or Date of Surgery 12/11/23   Referring Physician Jeannine   Pertinent History of Current Problem Tc Garcia is a 83 year old right hand dominant male with a relevant PMH of DM2 on insulin pump (history of DKA), paroxysmal atrial fibrillation on eliquis, HTN, HLD, CKD stage 3, recurrent pleural effusion, and history of spontaneous intracranial hemorrhage who sustained a left intraparenchymal hematoma after a fall on 12/11/23.  He was sitting at the table and became dizzy, sustaining a fall with a head injury. He thinks the injury was mild and denies loss of consciousness.  Head CT showed a 1.5x3cm left inferior cerebellar hematoma.  CT also noted a 0.1x0.3cm area of left medial frontal lobe diffusion restriction.  Afterward, he developed left sided weakness, lower extremity more than upper extremity, as well as increased blurriness of vision.  He denies any sensory changes, cognitive changes, difficulties with speech or swallow, or changes in hearing.  He also denies bowel or bladder changes, being volitional in both.  Hospital course was significant for dysphagia, hyper and hypotension, labile glusose and DKA, nausea, ABBIE, and UTI.   General Observations Pt alert and agreeable to cognitive/linguistic evaluation   Type of Evaluation   Type of Evaluation Speech, Language, Cognition   Oral Motor   Oral Musculature generally intact   Facial Symmetry (Oral Motor)   Facial Symmetry (Oral Motor) WNL   Lip Function (Oral Motor)   Comment, Lip Function (Oral Motor) WFL   Tongue Function (Oral Motor)   Comment, Tongue Function (Oral Motor) WFL   Jaw Function (Oral Motor)   Comment, Jaw Function (Oral Motor) WFL   Facial Sensation   Facial Sensation WNL   Vocal Quality/Secretion Management (Oral Motor)   Vocal Quality (Oral Motor) WNL   Motor Speech   Vocal Loudness  (Motor Speech) reduced loudness   Speech Intelligibility (Motor Speech) WFL   Breath Support (Motor Speech) minimal impairment   Auditory Comprehension   Follows Commands (Auditory Comprehension) 1-step command;2-step commands   Yes/No Questions (Auditory Comprehension) WNL   1 Step, Follows Commands (Auditory Comprehension) over 90% accuracy   2 Step, Follows Commands (Auditory Comprehension) 75-90% accuracy   Verbal Expression   Confrontational Naming (Verbal Expression) WNL   Conversational Speech (Verbal Expression) WNL   Automatic Speech (Verbal Expression) WNL   Responsive Naming (Verbal Expression) WNL   Narrative Speech (Verbal Expression) WNL   Word Finding Skills (Verbal Expression) WNL   Pragmatic Language   Comment, Assessment (Pragmatic Language) WNL   Reading Comprehension   Functional Reading Tasks (Reading Comprehension) WNL   Written Language   Comment, Assessment (Written Language) Pts hand is shaky, but writing is legible.   Written Expression (Written Language) WFL   Cognition   Cognitive Function WFL;other (see comments)  (based on initial assessment)   Cognitive Status Pt scores WNL across age norms, but pt has high cognitive demands at d/c   Additional cognitive-linguistic evaluation indicated  Recommended   Cognitive Status Exam Comments Further analysis due to pt takes care of wife at home.   Orientation Status (Cognition) oriented x 4   Affect/Mental Status (Cognition) WNL   General Therapy Interventions   Planned Therapy Interventions Cognitive Treatment   Cognitive treatment   (further analysis)   Clinical Impression   Criteria for Skilled Therapeutic Interventions Met (SLP Eval) Yes, treatment indicated   Activity Limitations Related to Problem List (SLP) Ability to safely return home at prior level of independence   Risks & Benefits of therapy have been explained evaluation/treatment results reviewed;care plan/treatment goals reviewed;risks/benefits reviewed;current/potential barriers  reviewed;participants voiced agreement with care plan;participants included;patient   Clinical Impression Comments SLP: Pt seen in ARU for cognitive/linguistic evaluation following recent hospitalization for cerebellar CVA.  OT reported concerns over cognitive function during previous day session.  SLP administered the CLQT with pt scoring WNL across all domains compared to age norms.  Pt does exhibit mildly slowed processing. Pt will  benefit from further analysis of cognitive function with focus on tasks such as medication management, financial tasks. PLOF also included taking care of wife.   SLP Total Evaluation Time   Cognitive  Performance Testing Minutes, per hour - includes time for administering test, interp results & prep report ([id number]) 45   SLP Goals   Therapy Frequency (SLP Eval) 6 times/week   SLP Predicted Duration/Target Date for Goal Attainment 01/06/24   SLP: Goal 1 Pt will demonstrate the ability to manage medications and finances x 100% accuracy with occasional cues.   Interventions   Interventions Quick Adds Cognition   SLP Discharge Planning   SLP Plan SLP: Trial with regular diet, ensure use of small bites/sips, slow rate, upright positioning during intake; RBANS? SAMSON as tx. pt takes care of wife at home.   Total Session Time   Timed Code Treatment Minutes 45   Total Session Time (sum of timed and untimed services) 45   Post Acute Settings Only   What unit is patient on? Acute Rehab   SLP - Acute Rehab Center Time   Individual Time (minutes) - SLP 45   Group Time (minutes) - SLP 0   Concurrent Time (minutes) - SLP 0   Co-Treatment Time (minutes) - SLP 0   ARC Total Session Time (minutes) - SLP 45   ARC Daily Total Session Time   SLP ARC Daily Total Session Time 105   ARC Daily Rehab Total Minutes 210     SUMMARY OF TEST:    The CLQT assesses visual attention and perception, working memory and language output skills, as well as auditory memory and comprehension.  Non-linguistic tasks can help  assess planning, and self-monitoring, visual discrimination and analysis, as well as creativity and mental flexibility.   Together, these subtests assess the cognitive domains of attention, memory, executive function, language, and visuospatial skills using a severity rating of either WNL (within normal limits), Mild, Moderate or Severe.        Cognitive Domain                   Severity Rating/Score  Attention                                   WNL/193  Memory                                    WNL/153  Executive Functions   WNL/26  Language                                 WNL/30  Visuospatial Skills                    WNL/89  Composite Severity Rating        WNL/4.0  Clock Drawing Severity Rating    WNL/12      Reference:  Argelia Pablo, [place], CCC-SLP, (2001) [employer]

## 2023-12-31 NOTE — PLAN OF CARE
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   Feet: doff socks sba r sock mod a assit starting over foot pt leaning to L with task with assist of therapsit to right, L sock don sba     Bathing: recommend shower chair/bench and grab rails. Declined shower on 12/31, likely Min A.  Toileting: CGA w/fww  IADLs: further assessment  Vision/Cognition: further assessment    Assessment: attempted shower however pt initially declined d/t not feeling well. Pt RN reports high blood sugar and need to start pts IV. Returned to work with pt and assisted to bathroom and bathing as able. ADL status and bathing updated as able.    Other Barriers to Discharge (DME, Family Training, etc): closer to discharge with family as able

## 2024-01-01 ENCOUNTER — APPOINTMENT (OUTPATIENT)
Dept: SPEECH THERAPY | Facility: CLINIC | Age: 84
DRG: 949 | End: 2024-01-01
Attending: PHYSICAL MEDICINE & REHABILITATION
Payer: COMMERCIAL

## 2024-01-01 ENCOUNTER — APPOINTMENT (OUTPATIENT)
Dept: PHYSICAL THERAPY | Facility: CLINIC | Age: 84
DRG: 949 | End: 2024-01-01
Attending: PHYSICAL MEDICINE & REHABILITATION
Payer: COMMERCIAL

## 2024-01-01 ENCOUNTER — APPOINTMENT (OUTPATIENT)
Dept: OCCUPATIONAL THERAPY | Facility: CLINIC | Age: 84
DRG: 949 | End: 2024-01-01
Attending: PHYSICAL MEDICINE & REHABILITATION
Payer: COMMERCIAL

## 2024-01-01 ENCOUNTER — APPOINTMENT (OUTPATIENT)
Dept: OCCUPATIONAL THERAPY | Facility: CLINIC | Age: 84
DRG: 638 | End: 2024-01-01
Attending: PHYSICIAN ASSISTANT
Payer: COMMERCIAL

## 2024-01-01 ENCOUNTER — LAB REQUISITION (OUTPATIENT)
Dept: LAB | Facility: CLINIC | Age: 84
End: 2024-01-01
Payer: COMMERCIAL

## 2024-01-01 ENCOUNTER — HOSPITAL ENCOUNTER (INPATIENT)
Facility: CLINIC | Age: 84
Setting detail: SURGERY ADMIT
End: 2024-01-01
Attending: ORTHOPAEDIC SURGERY | Admitting: ORTHOPAEDIC SURGERY
Payer: COMMERCIAL

## 2024-01-01 ENCOUNTER — DOCUMENTATION ONLY (OUTPATIENT)
Dept: OTHER | Facility: CLINIC | Age: 84
End: 2024-01-01
Payer: COMMERCIAL

## 2024-01-01 ENCOUNTER — ANESTHESIA EVENT (OUTPATIENT)
Dept: SURGERY | Facility: CLINIC | Age: 84
DRG: 521 | End: 2024-01-01
Payer: COMMERCIAL

## 2024-01-01 ENCOUNTER — HOSPITAL ENCOUNTER (EMERGENCY)
Facility: CLINIC | Age: 84
Discharge: HOME | End: 2024-01-28
Attending: EMERGENCY MEDICINE | Admitting: EMERGENCY MEDICINE
Payer: COMMERCIAL

## 2024-01-01 ENCOUNTER — APPOINTMENT (OUTPATIENT)
Dept: CT IMAGING | Facility: CLINIC | Age: 84
End: 2024-01-01
Attending: EMERGENCY MEDICINE
Payer: COMMERCIAL

## 2024-01-01 ENCOUNTER — HOSPITAL ENCOUNTER (INPATIENT)
Facility: CLINIC | Age: 84
LOS: 1 days | Discharge: HOME HEALTH | DRG: 638 | End: 2024-02-11
Attending: EMERGENCY MEDICINE | Admitting: HOSPITALIST
Payer: COMMERCIAL

## 2024-01-01 ENCOUNTER — APPOINTMENT (OUTPATIENT)
Dept: PHYSICAL THERAPY | Facility: CLINIC | Age: 84
End: 2024-01-01
Attending: INTERNAL MEDICINE
Payer: COMMERCIAL

## 2024-01-01 ENCOUNTER — HOSPITAL ENCOUNTER (OUTPATIENT)
Facility: CLINIC | Age: 84
Setting detail: OBSERVATION
Discharge: NURSING, REHAB OR LONG-TERM CARE | End: 2024-02-27
Attending: EMERGENCY MEDICINE | Admitting: INTERNAL MEDICINE
Payer: COMMERCIAL

## 2024-01-01 ENCOUNTER — TRANSFERRED RECORDS (OUTPATIENT)
Dept: HEALTH INFORMATION MANAGEMENT | Facility: CLINIC | Age: 84
End: 2024-01-01

## 2024-01-01 ENCOUNTER — MEDICAL CORRESPONDENCE (OUTPATIENT)
Dept: HEALTH INFORMATION MANAGEMENT | Facility: CLINIC | Age: 84
End: 2024-01-01

## 2024-01-01 ENCOUNTER — HOSPITAL ENCOUNTER (INPATIENT)
Facility: CLINIC | Age: 84
LOS: 15 days | Discharge: NURSING, REHAB OR LONG-TERM CARE | DRG: 521 | End: 2024-05-02
Attending: SOCIAL WORKER | Admitting: ORTHOPAEDIC SURGERY
Payer: COMMERCIAL

## 2024-01-01 ENCOUNTER — PATIENT OUTREACH (OUTPATIENT)
Dept: CARE COORDINATION | Facility: CLINIC | Age: 84
End: 2024-01-01
Payer: COMMERCIAL

## 2024-01-01 ENCOUNTER — HOSPITAL ENCOUNTER (EMERGENCY)
Facility: CLINIC | Age: 84
Discharge: HOME | End: 2024-03-18
Attending: EMERGENCY MEDICINE | Admitting: EMERGENCY MEDICINE
Payer: COMMERCIAL

## 2024-01-01 ENCOUNTER — TELEPHONE (OUTPATIENT)
Dept: NEUROSURGERY | Facility: CLINIC | Age: 84
End: 2024-01-01

## 2024-01-01 ENCOUNTER — APPOINTMENT (OUTPATIENT)
Dept: PHYSICAL THERAPY | Facility: CLINIC | Age: 84
DRG: 521 | End: 2024-01-01
Payer: COMMERCIAL

## 2024-01-01 ENCOUNTER — APPOINTMENT (OUTPATIENT)
Dept: GENERAL RADIOLOGY | Facility: CLINIC | Age: 84
End: 2024-01-01
Attending: INTERNAL MEDICINE
Payer: COMMERCIAL

## 2024-01-01 ENCOUNTER — APPOINTMENT (OUTPATIENT)
Dept: GENERAL RADIOLOGY | Facility: CLINIC | Age: 84
DRG: 521 | End: 2024-01-01
Attending: PHYSICIAN ASSISTANT
Payer: COMMERCIAL

## 2024-01-01 ENCOUNTER — APPOINTMENT (OUTPATIENT)
Dept: CT IMAGING | Facility: CLINIC | Age: 84
DRG: 521 | End: 2024-01-01
Attending: SOCIAL WORKER
Payer: COMMERCIAL

## 2024-01-01 ENCOUNTER — APPOINTMENT (OUTPATIENT)
Dept: ULTRASOUND IMAGING | Facility: CLINIC | Age: 84
DRG: 521 | End: 2024-01-01
Attending: PHYSICIAN ASSISTANT
Payer: COMMERCIAL

## 2024-01-01 ENCOUNTER — MEDICAL CORRESPONDENCE (OUTPATIENT)
Dept: HEALTH INFORMATION MANAGEMENT | Facility: CLINIC | Age: 84
End: 2024-01-01
Payer: COMMERCIAL

## 2024-01-01 ENCOUNTER — HOSPITAL ENCOUNTER (OUTPATIENT)
Facility: CLINIC | Age: 84
End: 2024-01-01
Attending: INTERNAL MEDICINE | Admitting: INTERNAL MEDICINE
Payer: COMMERCIAL

## 2024-01-01 ENCOUNTER — APPOINTMENT (OUTPATIENT)
Dept: CARDIOLOGY | Facility: CLINIC | Age: 84
DRG: 521 | End: 2024-01-01
Attending: PHYSICIAN ASSISTANT
Payer: COMMERCIAL

## 2024-01-01 ENCOUNTER — TELEPHONE (OUTPATIENT)
Dept: NEUROLOGY | Facility: CLINIC | Age: 84
End: 2024-01-01
Payer: COMMERCIAL

## 2024-01-01 ENCOUNTER — APPOINTMENT (OUTPATIENT)
Dept: GENERAL RADIOLOGY | Facility: CLINIC | Age: 84
DRG: 521 | End: 2024-01-01
Attending: SOCIAL WORKER
Payer: COMMERCIAL

## 2024-01-01 ENCOUNTER — APPOINTMENT (OUTPATIENT)
Dept: PHYSICAL THERAPY | Facility: CLINIC | Age: 84
DRG: 638 | End: 2024-01-01
Attending: PHYSICIAN ASSISTANT
Payer: COMMERCIAL

## 2024-01-01 ENCOUNTER — OFFICE VISIT (OUTPATIENT)
Dept: NEUROLOGY | Facility: CLINIC | Age: 84
End: 2024-01-01
Attending: PHYSICIAN ASSISTANT
Payer: COMMERCIAL

## 2024-01-01 ENCOUNTER — APPOINTMENT (OUTPATIENT)
Dept: GENERAL RADIOLOGY | Facility: CLINIC | Age: 84
DRG: 521 | End: 2024-01-01
Payer: COMMERCIAL

## 2024-01-01 ENCOUNTER — TELEPHONE (OUTPATIENT)
Dept: CARDIOLOGY | Facility: CLINIC | Age: 84
End: 2024-01-01

## 2024-01-01 ENCOUNTER — OFFICE VISIT (OUTPATIENT)
Dept: CARDIOLOGY | Facility: CLINIC | Age: 84
End: 2024-01-01
Payer: COMMERCIAL

## 2024-01-01 ENCOUNTER — TELEPHONE (OUTPATIENT)
Dept: PHARMACY | Facility: OTHER | Age: 84
End: 2024-01-01

## 2024-01-01 ENCOUNTER — ANESTHESIA (OUTPATIENT)
Dept: SURGERY | Facility: CLINIC | Age: 84
DRG: 521 | End: 2024-01-01
Payer: COMMERCIAL

## 2024-01-01 VITALS
HEART RATE: 69 BPM | HEIGHT: 69 IN | BODY MASS INDEX: 21.77 KG/M2 | WEIGHT: 147 LBS | SYSTOLIC BLOOD PRESSURE: 168 MMHG | OXYGEN SATURATION: 100 % | DIASTOLIC BLOOD PRESSURE: 81 MMHG

## 2024-01-01 VITALS
DIASTOLIC BLOOD PRESSURE: 66 MMHG | HEIGHT: 69 IN | HEART RATE: 70 BPM | TEMPERATURE: 97.8 F | OXYGEN SATURATION: 96 % | WEIGHT: 148 LBS | RESPIRATION RATE: 20 BRPM | BODY MASS INDEX: 21.92 KG/M2 | SYSTOLIC BLOOD PRESSURE: 124 MMHG

## 2024-01-01 VITALS
HEIGHT: 69 IN | OXYGEN SATURATION: 92 % | SYSTOLIC BLOOD PRESSURE: 135 MMHG | HEART RATE: 66 BPM | WEIGHT: 150 LBS | RESPIRATION RATE: 21 BRPM | DIASTOLIC BLOOD PRESSURE: 86 MMHG | BODY MASS INDEX: 22.22 KG/M2 | TEMPERATURE: 97.7 F

## 2024-01-01 VITALS
RESPIRATION RATE: 33 BRPM | SYSTOLIC BLOOD PRESSURE: 130 MMHG | TEMPERATURE: 98.1 F | BODY MASS INDEX: 21.56 KG/M2 | HEART RATE: 73 BPM | DIASTOLIC BLOOD PRESSURE: 77 MMHG | HEIGHT: 69 IN | OXYGEN SATURATION: 99 % | WEIGHT: 145.6 LBS

## 2024-01-01 VITALS
BODY MASS INDEX: 21.22 KG/M2 | DIASTOLIC BLOOD PRESSURE: 74 MMHG | OXYGEN SATURATION: 94 % | WEIGHT: 143.3 LBS | RESPIRATION RATE: 20 BRPM | TEMPERATURE: 98 F | HEART RATE: 77 BPM | HEIGHT: 69 IN | SYSTOLIC BLOOD PRESSURE: 142 MMHG

## 2024-01-01 VITALS
RESPIRATION RATE: 18 BRPM | OXYGEN SATURATION: 100 % | WEIGHT: 144 LBS | HEART RATE: 56 BPM | BODY MASS INDEX: 21.33 KG/M2 | TEMPERATURE: 97.3 F | HEIGHT: 69 IN | DIASTOLIC BLOOD PRESSURE: 93 MMHG | SYSTOLIC BLOOD PRESSURE: 132 MMHG

## 2024-01-01 VITALS
TEMPERATURE: 97.8 F | HEART RATE: 79 BPM | HEIGHT: 69 IN | OXYGEN SATURATION: 94 % | SYSTOLIC BLOOD PRESSURE: 120 MMHG | DIASTOLIC BLOOD PRESSURE: 89 MMHG | RESPIRATION RATE: 16 BRPM | BODY MASS INDEX: 24.65 KG/M2 | WEIGHT: 166.45 LBS

## 2024-01-01 VITALS
HEIGHT: 69 IN | DIASTOLIC BLOOD PRESSURE: 78 MMHG | HEART RATE: 76 BPM | SYSTOLIC BLOOD PRESSURE: 148 MMHG | OXYGEN SATURATION: 98 % | WEIGHT: 147 LBS | BODY MASS INDEX: 21.77 KG/M2

## 2024-01-01 DIAGNOSIS — R53.1 WEAKNESS: ICD-10-CM

## 2024-01-01 DIAGNOSIS — I10 ESSENTIAL (PRIMARY) HYPERTENSION: ICD-10-CM

## 2024-01-01 DIAGNOSIS — D64.9 ANEMIA, UNSPECIFIED: ICD-10-CM

## 2024-01-01 DIAGNOSIS — S00.03XA SCALP HEMATOMA, INITIAL ENCOUNTER: ICD-10-CM

## 2024-01-01 DIAGNOSIS — D63.1 ANEMIA IN STAGE 3 CHRONIC KIDNEY DISEASE, UNSPECIFIED WHETHER STAGE 3A OR 3B CKD (H): ICD-10-CM

## 2024-01-01 DIAGNOSIS — R73.9 HYPERGLYCEMIA: ICD-10-CM

## 2024-01-01 DIAGNOSIS — E10.9 BRITTLE DIABETES MELLITUS (H): ICD-10-CM

## 2024-01-01 DIAGNOSIS — I27.20 PULMONARY HYPERTENSION (H): ICD-10-CM

## 2024-01-01 DIAGNOSIS — Z86.79 HX OF INTRACRANIAL HEMORRHAGE: ICD-10-CM

## 2024-01-01 DIAGNOSIS — Z79.4 TYPE 2 DIABETES MELLITUS WITH HYPERGLYCEMIA, WITH LONG-TERM CURRENT USE OF INSULIN (H): ICD-10-CM

## 2024-01-01 DIAGNOSIS — F33.9 MAJOR DEPRESSIVE DISORDER, RECURRENT, UNSPECIFIED (H): ICD-10-CM

## 2024-01-01 DIAGNOSIS — I10 PRIMARY HYPERTENSION: ICD-10-CM

## 2024-01-01 DIAGNOSIS — E11.69 TYPE 2 DIABETES MELLITUS WITH OTHER SPECIFIED COMPLICATION, WITH LONG-TERM CURRENT USE OF INSULIN (H): ICD-10-CM

## 2024-01-01 DIAGNOSIS — Z79.4 TYPE 2 DIABETES MELLITUS WITH OTHER SPECIFIED COMPLICATION, WITH LONG-TERM CURRENT USE OF INSULIN (H): ICD-10-CM

## 2024-01-01 DIAGNOSIS — S60.512A ABRASION OF LEFT HAND, INITIAL ENCOUNTER: ICD-10-CM

## 2024-01-01 DIAGNOSIS — S12.501A CLOSED NONDISPLACED FRACTURE OF SIXTH CERVICAL VERTEBRA, UNSPECIFIED FRACTURE MORPHOLOGY, INITIAL ENCOUNTER (H): ICD-10-CM

## 2024-01-01 DIAGNOSIS — E11.9 TYPE 2 DIABETES MELLITUS WITHOUT COMPLICATIONS (H): ICD-10-CM

## 2024-01-01 DIAGNOSIS — Z86.73 HISTORY OF STROKE: ICD-10-CM

## 2024-01-01 DIAGNOSIS — I61.9 INTRAPARENCHYMAL HEMORRHAGE OF BRAIN (H): ICD-10-CM

## 2024-01-01 DIAGNOSIS — I50.9 ACUTE ON CHRONIC CONGESTIVE HEART FAILURE, UNSPECIFIED HEART FAILURE TYPE (H): ICD-10-CM

## 2024-01-01 DIAGNOSIS — N30.01 ACUTE CYSTITIS WITH HEMATURIA: Primary | ICD-10-CM
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
## 2024-01-01 DIAGNOSIS — E11.65 INADEQUATELY CONTROLLED DIABETES MELLITUS (H): ICD-10-CM

## 2024-01-01 DIAGNOSIS — I48.11 LONGSTANDING PERSISTENT ATRIAL FIBRILLATION (H): Primary | ICD-10-CM

## 2024-01-01 DIAGNOSIS — S72.145K CLOSED NONDISPLACED INTERTROCHANTERIC FRACTURE OF LEFT FEMUR WITH NONUNION, SUBSEQUENT ENCOUNTER: Primary | ICD-10-CM

## 2024-01-01 DIAGNOSIS — S72.142A CLOSED COMMINUTED INTERTROCHANTERIC FRACTURE OF LEFT FEMUR, INITIAL ENCOUNTER (H): ICD-10-CM

## 2024-01-01 DIAGNOSIS — I48.20 CHRONIC ATRIAL FIBRILLATION (H): ICD-10-CM

## 2024-01-01 DIAGNOSIS — R73.09 LABILE BLOOD GLUCOSE: ICD-10-CM

## 2024-01-01 DIAGNOSIS — I50.33 ACUTE ON CHRONIC HEART FAILURE WITH PRESERVED EJECTION FRACTION (HFPEF) (H): ICD-10-CM

## 2024-01-01 DIAGNOSIS — R94.4 ABNORMAL RESULTS OF KIDNEY FUNCTION STUDIES: ICD-10-CM

## 2024-01-01 DIAGNOSIS — S22.019A CLOSED FRACTURE OF FIRST THORACIC VERTEBRA, UNSPECIFIED FRACTURE MORPHOLOGY, INITIAL ENCOUNTER (H): ICD-10-CM

## 2024-01-01 DIAGNOSIS — N18.30 ANEMIA IN STAGE 3 CHRONIC KIDNEY DISEASE, UNSPECIFIED WHETHER STAGE 3A OR 3B CKD (H): ICD-10-CM

## 2024-01-01 DIAGNOSIS — E11.65 TYPE 2 DIABETES MELLITUS WITH HYPERGLYCEMIA, WITH LONG-TERM CURRENT USE OF INSULIN (H): ICD-10-CM

## 2024-01-01 DIAGNOSIS — Z86.79 HISTORY OF HEART FAILURE: ICD-10-CM

## 2024-01-01 DIAGNOSIS — I50.32 CHRONIC DIASTOLIC HEART FAILURE (H): ICD-10-CM

## 2024-01-01 DIAGNOSIS — M35.00 SICCA, UNSPECIFIED TYPE (H): ICD-10-CM

## 2024-01-01 DIAGNOSIS — J96.01 ACUTE HYPOXIC RESPIRATORY FAILURE (H): ICD-10-CM

## 2024-01-01 DIAGNOSIS — I50.33 ACUTE ON CHRONIC HEART FAILURE WITH PRESERVED EJECTION FRACTION (HFPEF) (H): Primary | ICD-10-CM
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
## 2024-01-01 LAB
ABO/RH(D): NORMAL
ABO/RH(D): NORMAL
ALBUMIN SERPL BCG-MCNC: 4 G/DL (ref 3.5–5.2)
ALBUMIN SERPL BCG-MCNC: 4 G/DL (ref 3.5–5.2)
ALBUMIN SERPL BCG-MCNC: 4.2 G/DL (ref 3.5–5.2)
ALBUMIN UR-MCNC: 10 MG/DL
ALBUMIN UR-MCNC: 10 MG/DL
ALBUMIN UR-MCNC: NEGATIVE MG/DL
ALBUMIN UR-MCNC: NEGATIVE MG/DL
ALP SERPL-CCNC: 114 U/L (ref 40–150)
ALP SERPL-CCNC: 118 U/L (ref 40–150)
ALP SERPL-CCNC: 80 U/L (ref 40–150)
ALT SERPL W P-5'-P-CCNC: 14 U/L (ref 0–70)
ALT SERPL W P-5'-P-CCNC: 18 U/L (ref 0–70)
ALT SERPL W P-5'-P-CCNC: 41 U/L (ref 0–70)
ANION GAP SERPL CALCULATED.3IONS-SCNC: 10 MMOL/L (ref 7–15)
ANION GAP SERPL CALCULATED.3IONS-SCNC: 11 MMOL/L (ref 7–15)
ANION GAP SERPL CALCULATED.3IONS-SCNC: 12 MMOL/L (ref 7–15)
ANION GAP SERPL CALCULATED.3IONS-SCNC: 13 MMOL/L (ref 7–15)
ANION GAP SERPL CALCULATED.3IONS-SCNC: 14 MMOL/L (ref 7–15)
ANION GAP SERPL CALCULATED.3IONS-SCNC: 14 MMOL/L (ref 7–15)
ANION GAP SERPL CALCULATED.3IONS-SCNC: 15 MMOL/L (ref 7–15)
ANION GAP SERPL CALCULATED.3IONS-SCNC: 15 MMOL/L (ref 7–15)
ANION GAP SERPL CALCULATED.3IONS-SCNC: 3 MMOL/L (ref 7–15)
ANION GAP SERPL CALCULATED.3IONS-SCNC: 5 MMOL/L (ref 7–15)
ANION GAP SERPL CALCULATED.3IONS-SCNC: 5 MMOL/L (ref 7–15)
ANION GAP SERPL CALCULATED.3IONS-SCNC: 7 MMOL/L (ref 7–15)
ANION GAP SERPL CALCULATED.3IONS-SCNC: 7 MMOL/L (ref 7–15)
ANION GAP SERPL CALCULATED.3IONS-SCNC: 8 MMOL/L (ref 7–15)
ANION GAP SERPL CALCULATED.3IONS-SCNC: 9 MMOL/L (ref 7–15)
ANTIBODY SCREEN: NEGATIVE
ANTIBODY SCREEN: NEGATIVE
APPEARANCE UR: ABNORMAL
APPEARANCE UR: CLEAR
AST SERPL W P-5'-P-CCNC: 17 U/L (ref 0–45)
AST SERPL W P-5'-P-CCNC: 18 U/L (ref 0–45)
AST SERPL W P-5'-P-CCNC: 98 U/L (ref 0–45)
ATRIAL RATE - MUSE: 68 BPM
ATRIAL RATE - MUSE: NORMAL BPM
B-OH-BUTYR SERPL-SCNC: 0.2 MMOL/L
BACTERIA #/AREA URNS HPF: ABNORMAL /HPF
BACTERIA UR CULT: ABNORMAL
BASE EXCESS BLDV CALC-SCNC: 4 MMOL/L (ref -3–3)
BASOPHILS # BLD AUTO: 0.1 10E3/UL (ref 0–0.2)
BASOPHILS NFR BLD AUTO: 0 %
BASOPHILS NFR BLD AUTO: 1 %
BASOPHILS NFR BLD AUTO: 2 %
BASOPHILS NFR BLD AUTO: 2 %
BILIRUB SERPL-MCNC: 0.3 MG/DL
BILIRUB SERPL-MCNC: 0.4 MG/DL
BILIRUB SERPL-MCNC: 0.4 MG/DL
BILIRUB UR QL STRIP: NEGATIVE
BLD PROD TYP BPU: NORMAL
BLD PROD TYP BPU: NORMAL
BLOOD COMPONENT TYPE: NORMAL
BLOOD COMPONENT TYPE: NORMAL
BUN SERPL-MCNC: 13.7 MG/DL (ref 8–23)
BUN SERPL-MCNC: 18 MG/DL (ref 8–23)
BUN SERPL-MCNC: 19.5 MG/DL (ref 8–23)
BUN SERPL-MCNC: 19.9 MG/DL (ref 8–23)
BUN SERPL-MCNC: 20.1 MG/DL (ref 8–23)
BUN SERPL-MCNC: 20.3 MG/DL (ref 8–23)
BUN SERPL-MCNC: 21.7 MG/DL (ref 8–23)
BUN SERPL-MCNC: 22.7 MG/DL (ref 8–23)
BUN SERPL-MCNC: 22.8 MG/DL (ref 8–23)
BUN SERPL-MCNC: 23.2 MG/DL (ref 8–23)
BUN SERPL-MCNC: 23.2 MG/DL (ref 8–23)
BUN SERPL-MCNC: 23.7 MG/DL (ref 8–23)
BUN SERPL-MCNC: 24 MG/DL (ref 8–23)
BUN SERPL-MCNC: 24.4 MG/DL (ref 8–23)
BUN SERPL-MCNC: 24.8 MG/DL (ref 8–23)
BUN SERPL-MCNC: 25.5 MG/DL (ref 8–23)
BUN SERPL-MCNC: 25.8 MG/DL (ref 8–23)
BUN SERPL-MCNC: 27.1 MG/DL (ref 8–23)
BUN SERPL-MCNC: 28.4 MG/DL (ref 8–23)
BUN SERPL-MCNC: 28.6 MG/DL (ref 8–23)
BUN SERPL-MCNC: 29 MG/DL (ref 8–23)
BUN SERPL-MCNC: 29.2 MG/DL (ref 8–23)
BUN SERPL-MCNC: 29.7 MG/DL (ref 8–23)
BUN SERPL-MCNC: 31.1 MG/DL (ref 8–23)
BUN SERPL-MCNC: 31.3 MG/DL (ref 8–23)
BUN SERPL-MCNC: 31.9 MG/DL (ref 8–23)
BUN SERPL-MCNC: 32.2 MG/DL (ref 8–23)
BUN SERPL-MCNC: 32.3 MG/DL (ref 8–23)
BUN SERPL-MCNC: 33.1 MG/DL (ref 8–23)
BUN SERPL-MCNC: 33.4 MG/DL (ref 8–23)
CALCIUM SERPL-MCNC: 8 MG/DL (ref 8.8–10.2)
CALCIUM SERPL-MCNC: 8.4 MG/DL (ref 8.8–10.2)
CALCIUM SERPL-MCNC: 8.5 MG/DL (ref 8.8–10.2)
CALCIUM SERPL-MCNC: 8.5 MG/DL (ref 8.8–10.2)
CALCIUM SERPL-MCNC: 8.6 MG/DL (ref 8.8–10.2)
CALCIUM SERPL-MCNC: 8.7 MG/DL (ref 8.8–10.2)
CALCIUM SERPL-MCNC: 8.8 MG/DL (ref 8.8–10.2)
CALCIUM SERPL-MCNC: 8.9 MG/DL (ref 8.8–10.2)
CALCIUM SERPL-MCNC: 9 MG/DL (ref 8.8–10.2)
CALCIUM SERPL-MCNC: 9.1 MG/DL (ref 8.8–10.2)
CALCIUM SERPL-MCNC: 9.1 MG/DL (ref 8.8–10.2)
CALCIUM SERPL-MCNC: 9.2 MG/DL (ref 8.8–10.2)
CALCIUM SERPL-MCNC: 9.3 MG/DL (ref 8.8–10.2)
CALCIUM SERPL-MCNC: 9.3 MG/DL (ref 8.8–10.2)
CHLORIDE SERPL-SCNC: 100 MMOL/L (ref 98–107)
CHLORIDE SERPL-SCNC: 101 MMOL/L (ref 98–107)
CHLORIDE SERPL-SCNC: 102 MMOL/L (ref 98–107)
CHLORIDE SERPL-SCNC: 103 MMOL/L (ref 98–107)
CHLORIDE SERPL-SCNC: 104 MMOL/L (ref 98–107)
CHLORIDE SERPL-SCNC: 105 MMOL/L (ref 98–107)
CHLORIDE SERPL-SCNC: 105 MMOL/L (ref 98–107)
CHLORIDE SERPL-SCNC: 106 MMOL/L (ref 98–107)
CHLORIDE SERPL-SCNC: 107 MMOL/L (ref 98–107)
CHLORIDE SERPL-SCNC: 108 MMOL/L (ref 98–107)
CHLORIDE SERPL-SCNC: 96 MMOL/L (ref 98–107)
CHLORIDE SERPL-SCNC: 96 MMOL/L (ref 98–107)
CHLORIDE SERPL-SCNC: 97 MMOL/L (ref 98–107)
CHLORIDE SERPL-SCNC: 98 MMOL/L (ref 98–107)
CHLORIDE SERPL-SCNC: 99 MMOL/L (ref 98–107)
CK SERPL-CCNC: 41 U/L (ref 39–308)
CODING SYSTEM: NORMAL
CODING SYSTEM: NORMAL
COLOR UR AUTO: ABNORMAL
CREAT SERPL-MCNC: 1.28 MG/DL (ref 0.67–1.17)
CREAT SERPL-MCNC: 1.28 MG/DL (ref 0.67–1.17)
CREAT SERPL-MCNC: 1.31 MG/DL (ref 0.67–1.17)
CREAT SERPL-MCNC: 1.35 MG/DL (ref 0.67–1.17)
CREAT SERPL-MCNC: 1.36 MG/DL (ref 0.67–1.17)
CREAT SERPL-MCNC: 1.37 MG/DL (ref 0.67–1.17)
CREAT SERPL-MCNC: 1.38 MG/DL (ref 0.67–1.17)
CREAT SERPL-MCNC: 1.38 MG/DL (ref 0.67–1.17)
CREAT SERPL-MCNC: 1.39 MG/DL (ref 0.67–1.17)
CREAT SERPL-MCNC: 1.39 MG/DL (ref 0.67–1.17)
CREAT SERPL-MCNC: 1.42 MG/DL (ref 0.67–1.17)
CREAT SERPL-MCNC: 1.43 MG/DL (ref 0.67–1.17)
CREAT SERPL-MCNC: 1.44 MG/DL (ref 0.67–1.17)
CREAT SERPL-MCNC: 1.44 MG/DL (ref 0.67–1.17)
CREAT SERPL-MCNC: 1.48 MG/DL (ref 0.67–1.17)
CREAT SERPL-MCNC: 1.48 MG/DL (ref 0.67–1.17)
CREAT SERPL-MCNC: 1.5 MG/DL (ref 0.67–1.17)
CREAT SERPL-MCNC: 1.5 MG/DL (ref 0.67–1.17)
CREAT SERPL-MCNC: 1.58 MG/DL (ref 0.67–1.17)
CREAT SERPL-MCNC: 1.59 MG/DL (ref 0.67–1.17)
CREAT SERPL-MCNC: 1.61 MG/DL (ref 0.67–1.17)
CREAT SERPL-MCNC: 1.62 MG/DL (ref 0.67–1.17)
CREAT SERPL-MCNC: 1.63 MG/DL (ref 0.67–1.17)
CREAT SERPL-MCNC: 1.67 MG/DL (ref 0.67–1.17)
CREAT SERPL-MCNC: 1.71 MG/DL (ref 0.67–1.17)
CREAT SERPL-MCNC: 1.72 MG/DL (ref 0.67–1.17)
CREAT SERPL-MCNC: 1.78 MG/DL (ref 0.67–1.17)
CREAT SERPL-MCNC: 1.81 MG/DL (ref 0.67–1.17)
CREAT SERPL-MCNC: 1.81 MG/DL (ref 0.67–1.17)
CREAT SERPL-MCNC: 1.84 MG/DL (ref 0.67–1.17)
CROSSMATCH: NORMAL
CROSSMATCH: NORMAL
DEPRECATED HCO3 PLAS-SCNC: 22 MMOL/L (ref 22–29)
DEPRECATED HCO3 PLAS-SCNC: 24 MMOL/L (ref 22–29)
DEPRECATED HCO3 PLAS-SCNC: 25 MMOL/L (ref 22–29)
DEPRECATED HCO3 PLAS-SCNC: 26 MMOL/L (ref 22–29)
DEPRECATED HCO3 PLAS-SCNC: 27 MMOL/L (ref 22–29)
DEPRECATED HCO3 PLAS-SCNC: 27 MMOL/L (ref 22–29)
DEPRECATED HCO3 PLAS-SCNC: 28 MMOL/L (ref 22–29)
DEPRECATED HCO3 PLAS-SCNC: 29 MMOL/L (ref 22–29)
DIASTOLIC BLOOD PRESSURE - MUSE: NORMAL MMHG
EGFRCR SERPLBLD CKD-EPI 2021: 36 ML/MIN/1.73M2
EGFRCR SERPLBLD CKD-EPI 2021: 37 ML/MIN/1.73M2
EGFRCR SERPLBLD CKD-EPI 2021: 39 ML/MIN/1.73M2
EGFRCR SERPLBLD CKD-EPI 2021: 39 ML/MIN/1.73M2
EGFRCR SERPLBLD CKD-EPI 2021: 40 ML/MIN/1.73M2
EGFRCR SERPLBLD CKD-EPI 2021: 41 ML/MIN/1.73M2
EGFRCR SERPLBLD CKD-EPI 2021: 41 ML/MIN/1.73M2
EGFRCR SERPLBLD CKD-EPI 2021: 42 ML/MIN/1.73M2
EGFRCR SERPLBLD CKD-EPI 2021: 42 ML/MIN/1.73M2
EGFRCR SERPLBLD CKD-EPI 2021: 43 ML/MIN/1.73M2
EGFRCR SERPLBLD CKD-EPI 2021: 45 ML/MIN/1.73M2
EGFRCR SERPLBLD CKD-EPI 2021: 45 ML/MIN/1.73M2
EGFRCR SERPLBLD CKD-EPI 2021: 46 ML/MIN/1.73M2
EGFRCR SERPLBLD CKD-EPI 2021: 46 ML/MIN/1.73M2
EGFRCR SERPLBLD CKD-EPI 2021: 48 ML/MIN/1.73M2
EGFRCR SERPLBLD CKD-EPI 2021: 49 ML/MIN/1.73M2
EGFRCR SERPLBLD CKD-EPI 2021: 50 ML/MIN/1.73M2
EGFRCR SERPLBLD CKD-EPI 2021: 51 ML/MIN/1.73M2
EGFRCR SERPLBLD CKD-EPI 2021: 54 ML/MIN/1.73M2
EGFRCR SERPLBLD CKD-EPI 2021: 55 ML/MIN/1.73M2
EGFRCR SERPLBLD CKD-EPI 2021: 55 ML/MIN/1.73M2
EOSINOPHIL # BLD AUTO: 0.1 10E3/UL (ref 0–0.7)
EOSINOPHIL # BLD AUTO: 0.2 10E3/UL (ref 0–0.7)
EOSINOPHIL # BLD AUTO: 0.5 10E3/UL (ref 0–0.7)
EOSINOPHIL # BLD AUTO: 0.6 10E3/UL (ref 0–0.7)
EOSINOPHIL # BLD AUTO: 0.6 10E3/UL (ref 0–0.7)
EOSINOPHIL # BLD AUTO: 0.7 10E3/UL (ref 0–0.7)
EOSINOPHIL # BLD AUTO: 0.7 10E3/UL (ref 0–0.7)
EOSINOPHIL # BLD AUTO: 1 10E3/UL (ref 0–0.7)
EOSINOPHIL NFR BLD AUTO: 0 %
EOSINOPHIL NFR BLD AUTO: 1 %
EOSINOPHIL NFR BLD AUTO: 10 %
EOSINOPHIL NFR BLD AUTO: 11 %
EOSINOPHIL NFR BLD AUTO: 2 %
EOSINOPHIL NFR BLD AUTO: 3 %
EOSINOPHIL NFR BLD AUTO: 7 %
EOSINOPHIL NFR BLD AUTO: 7 %
EOSINOPHIL NFR BLD AUTO: 8 %
EOSINOPHIL NFR BLD AUTO: 8 %
ERYTHROCYTE [DISTWIDTH] IN BLOOD BY AUTOMATED COUNT: 15.3 % (ref 10–15)
ERYTHROCYTE [DISTWIDTH] IN BLOOD BY AUTOMATED COUNT: 15.5 % (ref 10–15)
ERYTHROCYTE [DISTWIDTH] IN BLOOD BY AUTOMATED COUNT: 16.4 % (ref 10–15)
ERYTHROCYTE [DISTWIDTH] IN BLOOD BY AUTOMATED COUNT: 16.4 % (ref 10–15)
ERYTHROCYTE [DISTWIDTH] IN BLOOD BY AUTOMATED COUNT: 17.1 % (ref 10–15)
ERYTHROCYTE [DISTWIDTH] IN BLOOD BY AUTOMATED COUNT: 17.2 % (ref 10–15)
ERYTHROCYTE [DISTWIDTH] IN BLOOD BY AUTOMATED COUNT: 17.3 % (ref 10–15)
ERYTHROCYTE [DISTWIDTH] IN BLOOD BY AUTOMATED COUNT: 17.5 % (ref 10–15)
ERYTHROCYTE [DISTWIDTH] IN BLOOD BY AUTOMATED COUNT: 17.7 % (ref 10–15)
ERYTHROCYTE [DISTWIDTH] IN BLOOD BY AUTOMATED COUNT: 17.9 % (ref 10–15)
ERYTHROCYTE [DISTWIDTH] IN BLOOD BY AUTOMATED COUNT: 18 % (ref 10–15)
ERYTHROCYTE [DISTWIDTH] IN BLOOD BY AUTOMATED COUNT: 18.1 % (ref 10–15)
ERYTHROCYTE [DISTWIDTH] IN BLOOD BY AUTOMATED COUNT: 18.7 % (ref 10–15)
FERRITIN SERPL-MCNC: 177 NG/ML (ref 31–409)
FLUAV RNA SPEC QL NAA+PROBE: NEGATIVE
FLUBV RNA RESP QL NAA+PROBE: NEGATIVE
GLUCOSE BLDC GLUCOMTR-MCNC: 100 MG/DL (ref 70–99)
GLUCOSE BLDC GLUCOMTR-MCNC: 100 MG/DL (ref 70–99)
GLUCOSE BLDC GLUCOMTR-MCNC: 101 MG/DL (ref 70–99)
GLUCOSE BLDC GLUCOMTR-MCNC: 101 MG/DL (ref 70–99)
GLUCOSE BLDC GLUCOMTR-MCNC: 102 MG/DL (ref 70–99)
GLUCOSE BLDC GLUCOMTR-MCNC: 102 MG/DL (ref 70–99)
GLUCOSE BLDC GLUCOMTR-MCNC: 103 MG/DL (ref 70–99)
GLUCOSE BLDC GLUCOMTR-MCNC: 103 MG/DL (ref 70–99)
GLUCOSE BLDC GLUCOMTR-MCNC: 104 MG/DL (ref 70–99)
GLUCOSE BLDC GLUCOMTR-MCNC: 104 MG/DL (ref 70–99)
GLUCOSE BLDC GLUCOMTR-MCNC: 108 MG/DL (ref 70–99)
GLUCOSE BLDC GLUCOMTR-MCNC: 108 MG/DL (ref 70–99)
GLUCOSE BLDC GLUCOMTR-MCNC: 110 MG/DL (ref 70–99)
GLUCOSE BLDC GLUCOMTR-MCNC: 111 MG/DL (ref 70–99)
GLUCOSE BLDC GLUCOMTR-MCNC: 112 MG/DL (ref 70–99)
GLUCOSE BLDC GLUCOMTR-MCNC: 115 MG/DL (ref 70–99)
GLUCOSE BLDC GLUCOMTR-MCNC: 116 MG/DL (ref 70–99)
GLUCOSE BLDC GLUCOMTR-MCNC: 118 MG/DL (ref 70–99)
GLUCOSE BLDC GLUCOMTR-MCNC: 119 MG/DL (ref 70–99)
GLUCOSE BLDC GLUCOMTR-MCNC: 120 MG/DL (ref 70–99)
GLUCOSE BLDC GLUCOMTR-MCNC: 121 MG/DL (ref 70–99)
GLUCOSE BLDC GLUCOMTR-MCNC: 123 MG/DL (ref 70–99)
GLUCOSE BLDC GLUCOMTR-MCNC: 124 MG/DL (ref 70–99)
GLUCOSE BLDC GLUCOMTR-MCNC: 125 MG/DL (ref 70–99)
GLUCOSE BLDC GLUCOMTR-MCNC: 126 MG/DL (ref 70–99)
GLUCOSE BLDC GLUCOMTR-MCNC: 126 MG/DL (ref 70–99)
GLUCOSE BLDC GLUCOMTR-MCNC: 127 MG/DL (ref 70–99)
GLUCOSE BLDC GLUCOMTR-MCNC: 128 MG/DL (ref 70–99)
GLUCOSE BLDC GLUCOMTR-MCNC: 128 MG/DL (ref 70–99)
GLUCOSE BLDC GLUCOMTR-MCNC: 129 MG/DL (ref 70–99)
GLUCOSE BLDC GLUCOMTR-MCNC: 130 MG/DL (ref 70–99)
GLUCOSE BLDC GLUCOMTR-MCNC: 131 MG/DL (ref 70–99)
GLUCOSE BLDC GLUCOMTR-MCNC: 131 MG/DL (ref 70–99)
GLUCOSE BLDC GLUCOMTR-MCNC: 134 MG/DL (ref 70–99)
GLUCOSE BLDC GLUCOMTR-MCNC: 135 MG/DL (ref 70–99)
GLUCOSE BLDC GLUCOMTR-MCNC: 137 MG/DL (ref 70–99)
GLUCOSE BLDC GLUCOMTR-MCNC: 138 MG/DL (ref 70–99)
GLUCOSE BLDC GLUCOMTR-MCNC: 139 MG/DL (ref 70–99)
GLUCOSE BLDC GLUCOMTR-MCNC: 139 MG/DL (ref 70–99)
GLUCOSE BLDC GLUCOMTR-MCNC: 142 MG/DL (ref 70–99)
GLUCOSE BLDC GLUCOMTR-MCNC: 145 MG/DL (ref 70–99)
GLUCOSE BLDC GLUCOMTR-MCNC: 145 MG/DL (ref 70–99)
GLUCOSE BLDC GLUCOMTR-MCNC: 146 MG/DL (ref 70–99)
GLUCOSE BLDC GLUCOMTR-MCNC: 146 MG/DL (ref 70–99)
GLUCOSE BLDC GLUCOMTR-MCNC: 148 MG/DL (ref 70–99)
GLUCOSE BLDC GLUCOMTR-MCNC: 148 MG/DL (ref 70–99)
GLUCOSE BLDC GLUCOMTR-MCNC: 151 MG/DL (ref 70–99)
GLUCOSE BLDC GLUCOMTR-MCNC: 152 MG/DL (ref 70–99)
GLUCOSE BLDC GLUCOMTR-MCNC: 153 MG/DL (ref 70–99)
GLUCOSE BLDC GLUCOMTR-MCNC: 153 MG/DL (ref 70–99)
GLUCOSE BLDC GLUCOMTR-MCNC: 154 MG/DL (ref 70–99)
GLUCOSE BLDC GLUCOMTR-MCNC: 154 MG/DL (ref 70–99)
GLUCOSE BLDC GLUCOMTR-MCNC: 155 MG/DL (ref 70–99)
GLUCOSE BLDC GLUCOMTR-MCNC: 155 MG/DL (ref 70–99)
GLUCOSE BLDC GLUCOMTR-MCNC: 156 MG/DL (ref 70–99)
GLUCOSE BLDC GLUCOMTR-MCNC: 156 MG/DL (ref 70–99)
GLUCOSE BLDC GLUCOMTR-MCNC: 157 MG/DL (ref 70–99)
GLUCOSE BLDC GLUCOMTR-MCNC: 160 MG/DL (ref 70–99)
GLUCOSE BLDC GLUCOMTR-MCNC: 161 MG/DL (ref 70–99)
GLUCOSE BLDC GLUCOMTR-MCNC: 162 MG/DL (ref 70–99)
GLUCOSE BLDC GLUCOMTR-MCNC: 165 MG/DL (ref 70–99)
GLUCOSE BLDC GLUCOMTR-MCNC: 165 MG/DL (ref 70–99)
GLUCOSE BLDC GLUCOMTR-MCNC: 166 MG/DL (ref 70–99)
GLUCOSE BLDC GLUCOMTR-MCNC: 168 MG/DL (ref 70–99)
GLUCOSE BLDC GLUCOMTR-MCNC: 171 MG/DL (ref 70–99)
GLUCOSE BLDC GLUCOMTR-MCNC: 171 MG/DL (ref 70–99)
GLUCOSE BLDC GLUCOMTR-MCNC: 172 MG/DL (ref 70–99)
GLUCOSE BLDC GLUCOMTR-MCNC: 173 MG/DL (ref 70–99)
GLUCOSE BLDC GLUCOMTR-MCNC: 173 MG/DL (ref 70–99)
GLUCOSE BLDC GLUCOMTR-MCNC: 174 MG/DL (ref 70–99)
GLUCOSE BLDC GLUCOMTR-MCNC: 174 MG/DL (ref 70–99)
GLUCOSE BLDC GLUCOMTR-MCNC: 177 MG/DL (ref 70–99)
GLUCOSE BLDC GLUCOMTR-MCNC: 178 MG/DL (ref 70–99)
GLUCOSE BLDC GLUCOMTR-MCNC: 180 MG/DL (ref 70–99)
GLUCOSE BLDC GLUCOMTR-MCNC: 181 MG/DL (ref 70–99)
GLUCOSE BLDC GLUCOMTR-MCNC: 182 MG/DL (ref 70–99)
GLUCOSE BLDC GLUCOMTR-MCNC: 183 MG/DL (ref 70–99)
GLUCOSE BLDC GLUCOMTR-MCNC: 185 MG/DL (ref 70–99)
GLUCOSE BLDC GLUCOMTR-MCNC: 188 MG/DL (ref 70–99)
GLUCOSE BLDC GLUCOMTR-MCNC: 188 MG/DL (ref 70–99)
GLUCOSE BLDC GLUCOMTR-MCNC: 189 MG/DL (ref 70–99)
GLUCOSE BLDC GLUCOMTR-MCNC: 190 MG/DL (ref 70–99)
GLUCOSE BLDC GLUCOMTR-MCNC: 191 MG/DL (ref 70–99)
GLUCOSE BLDC GLUCOMTR-MCNC: 192 MG/DL (ref 70–99)
GLUCOSE BLDC GLUCOMTR-MCNC: 192 MG/DL (ref 70–99)
GLUCOSE BLDC GLUCOMTR-MCNC: 194 MG/DL (ref 70–99)
GLUCOSE BLDC GLUCOMTR-MCNC: 194 MG/DL (ref 70–99)
GLUCOSE BLDC GLUCOMTR-MCNC: 196 MG/DL (ref 70–99)
GLUCOSE BLDC GLUCOMTR-MCNC: 196 MG/DL (ref 70–99)
GLUCOSE BLDC GLUCOMTR-MCNC: 197 MG/DL (ref 70–99)
GLUCOSE BLDC GLUCOMTR-MCNC: 198 MG/DL (ref 70–99)
GLUCOSE BLDC GLUCOMTR-MCNC: 198 MG/DL (ref 70–99)
GLUCOSE BLDC GLUCOMTR-MCNC: 201 MG/DL (ref 70–99)
GLUCOSE BLDC GLUCOMTR-MCNC: 204 MG/DL (ref 70–99)
GLUCOSE BLDC GLUCOMTR-MCNC: 205 MG/DL (ref 70–99)
GLUCOSE BLDC GLUCOMTR-MCNC: 207 MG/DL (ref 70–99)
GLUCOSE BLDC GLUCOMTR-MCNC: 212 MG/DL (ref 70–99)
GLUCOSE BLDC GLUCOMTR-MCNC: 212 MG/DL (ref 70–99)
GLUCOSE BLDC GLUCOMTR-MCNC: 217 MG/DL (ref 70–99)
GLUCOSE BLDC GLUCOMTR-MCNC: 218 MG/DL (ref 70–99)
GLUCOSE BLDC GLUCOMTR-MCNC: 220 MG/DL (ref 70–99)
GLUCOSE BLDC GLUCOMTR-MCNC: 220 MG/DL (ref 70–99)
GLUCOSE BLDC GLUCOMTR-MCNC: 221 MG/DL (ref 70–99)
GLUCOSE BLDC GLUCOMTR-MCNC: 222 MG/DL (ref 70–99)
GLUCOSE BLDC GLUCOMTR-MCNC: 223 MG/DL (ref 70–99)
GLUCOSE BLDC GLUCOMTR-MCNC: 224 MG/DL (ref 70–99)
GLUCOSE BLDC GLUCOMTR-MCNC: 226 MG/DL (ref 70–99)
GLUCOSE BLDC GLUCOMTR-MCNC: 227 MG/DL (ref 70–99)
GLUCOSE BLDC GLUCOMTR-MCNC: 229 MG/DL (ref 70–99)
GLUCOSE BLDC GLUCOMTR-MCNC: 231 MG/DL (ref 70–99)
GLUCOSE BLDC GLUCOMTR-MCNC: 233 MG/DL (ref 70–99)
GLUCOSE BLDC GLUCOMTR-MCNC: 233 MG/DL (ref 70–99)
GLUCOSE BLDC GLUCOMTR-MCNC: 235 MG/DL (ref 70–99)
GLUCOSE BLDC GLUCOMTR-MCNC: 238 MG/DL (ref 70–99)
GLUCOSE BLDC GLUCOMTR-MCNC: 238 MG/DL (ref 70–99)
GLUCOSE BLDC GLUCOMTR-MCNC: 239 MG/DL (ref 70–99)
GLUCOSE BLDC GLUCOMTR-MCNC: 239 MG/DL (ref 70–99)
GLUCOSE BLDC GLUCOMTR-MCNC: 242 MG/DL (ref 70–99)
GLUCOSE BLDC GLUCOMTR-MCNC: 243 MG/DL (ref 70–99)
GLUCOSE BLDC GLUCOMTR-MCNC: 243 MG/DL (ref 70–99)
GLUCOSE BLDC GLUCOMTR-MCNC: 244 MG/DL (ref 70–99)
GLUCOSE BLDC GLUCOMTR-MCNC: 245 MG/DL (ref 70–99)
GLUCOSE BLDC GLUCOMTR-MCNC: 245 MG/DL (ref 70–99)
GLUCOSE BLDC GLUCOMTR-MCNC: 247 MG/DL (ref 70–99)
GLUCOSE BLDC GLUCOMTR-MCNC: 248 MG/DL (ref 70–99)
GLUCOSE BLDC GLUCOMTR-MCNC: 248 MG/DL (ref 70–99)
GLUCOSE BLDC GLUCOMTR-MCNC: 251 MG/DL (ref 70–99)
GLUCOSE BLDC GLUCOMTR-MCNC: 253 MG/DL (ref 70–99)
GLUCOSE BLDC GLUCOMTR-MCNC: 253 MG/DL (ref 70–99)
GLUCOSE BLDC GLUCOMTR-MCNC: 260 MG/DL (ref 70–99)
GLUCOSE BLDC GLUCOMTR-MCNC: 261 MG/DL (ref 70–99)
GLUCOSE BLDC GLUCOMTR-MCNC: 261 MG/DL (ref 70–99)
GLUCOSE BLDC GLUCOMTR-MCNC: 263 MG/DL (ref 70–99)
GLUCOSE BLDC GLUCOMTR-MCNC: 263 MG/DL (ref 70–99)
GLUCOSE BLDC GLUCOMTR-MCNC: 264 MG/DL (ref 70–99)
GLUCOSE BLDC GLUCOMTR-MCNC: 264 MG/DL (ref 70–99)
GLUCOSE BLDC GLUCOMTR-MCNC: 269 MG/DL (ref 70–99)
GLUCOSE BLDC GLUCOMTR-MCNC: 270 MG/DL (ref 70–99)
GLUCOSE BLDC GLUCOMTR-MCNC: 271 MG/DL (ref 70–99)
GLUCOSE BLDC GLUCOMTR-MCNC: 271 MG/DL (ref 70–99)
GLUCOSE BLDC GLUCOMTR-MCNC: 273 MG/DL (ref 70–99)
GLUCOSE BLDC GLUCOMTR-MCNC: 274 MG/DL (ref 70–99)
GLUCOSE BLDC GLUCOMTR-MCNC: 275 MG/DL (ref 70–99)
GLUCOSE BLDC GLUCOMTR-MCNC: 277 MG/DL (ref 70–99)
GLUCOSE BLDC GLUCOMTR-MCNC: 277 MG/DL (ref 70–99)
GLUCOSE BLDC GLUCOMTR-MCNC: 28 MG/DL (ref 70–99)
GLUCOSE BLDC GLUCOMTR-MCNC: 281 MG/DL (ref 70–99)
GLUCOSE BLDC GLUCOMTR-MCNC: 282 MG/DL (ref 70–99)
GLUCOSE BLDC GLUCOMTR-MCNC: 285 MG/DL (ref 70–99)
GLUCOSE BLDC GLUCOMTR-MCNC: 286 MG/DL (ref 70–99)
GLUCOSE BLDC GLUCOMTR-MCNC: 290 MG/DL (ref 70–99)
GLUCOSE BLDC GLUCOMTR-MCNC: 291 MG/DL (ref 70–99)
GLUCOSE BLDC GLUCOMTR-MCNC: 293 MG/DL (ref 70–99)
GLUCOSE BLDC GLUCOMTR-MCNC: 295 MG/DL (ref 70–99)
GLUCOSE BLDC GLUCOMTR-MCNC: 295 MG/DL (ref 70–99)
GLUCOSE BLDC GLUCOMTR-MCNC: 298 MG/DL (ref 70–99)
GLUCOSE BLDC GLUCOMTR-MCNC: 298 MG/DL (ref 70–99)
GLUCOSE BLDC GLUCOMTR-MCNC: 299 MG/DL (ref 70–99)
GLUCOSE BLDC GLUCOMTR-MCNC: 300 MG/DL (ref 70–99)
GLUCOSE BLDC GLUCOMTR-MCNC: 301 MG/DL (ref 70–99)
GLUCOSE BLDC GLUCOMTR-MCNC: 302 MG/DL (ref 70–99)
GLUCOSE BLDC GLUCOMTR-MCNC: 304 MG/DL (ref 70–99)
GLUCOSE BLDC GLUCOMTR-MCNC: 305 MG/DL (ref 70–99)
GLUCOSE BLDC GLUCOMTR-MCNC: 314 MG/DL (ref 70–99)
GLUCOSE BLDC GLUCOMTR-MCNC: 317 MG/DL (ref 70–99)
GLUCOSE BLDC GLUCOMTR-MCNC: 318 MG/DL (ref 70–99)
GLUCOSE BLDC GLUCOMTR-MCNC: 322 MG/DL (ref 70–99)
GLUCOSE BLDC GLUCOMTR-MCNC: 328 MG/DL (ref 70–99)
GLUCOSE BLDC GLUCOMTR-MCNC: 330 MG/DL (ref 70–99)
GLUCOSE BLDC GLUCOMTR-MCNC: 332 MG/DL (ref 70–99)
GLUCOSE BLDC GLUCOMTR-MCNC: 332 MG/DL (ref 70–99)
GLUCOSE BLDC GLUCOMTR-MCNC: 336 MG/DL (ref 70–99)
GLUCOSE BLDC GLUCOMTR-MCNC: 337 MG/DL (ref 70–99)
GLUCOSE BLDC GLUCOMTR-MCNC: 337 MG/DL (ref 70–99)
GLUCOSE BLDC GLUCOMTR-MCNC: 338 MG/DL (ref 70–99)
GLUCOSE BLDC GLUCOMTR-MCNC: 342 MG/DL (ref 70–99)
GLUCOSE BLDC GLUCOMTR-MCNC: 344 MG/DL (ref 70–99)
GLUCOSE BLDC GLUCOMTR-MCNC: 345 MG/DL (ref 70–99)
GLUCOSE BLDC GLUCOMTR-MCNC: 346 MG/DL (ref 70–99)
GLUCOSE BLDC GLUCOMTR-MCNC: 354 MG/DL (ref 70–99)
GLUCOSE BLDC GLUCOMTR-MCNC: 355 MG/DL (ref 70–99)
GLUCOSE BLDC GLUCOMTR-MCNC: 357 MG/DL (ref 70–99)
GLUCOSE BLDC GLUCOMTR-MCNC: 358 MG/DL (ref 70–99)
GLUCOSE BLDC GLUCOMTR-MCNC: 359 MG/DL (ref 70–99)
GLUCOSE BLDC GLUCOMTR-MCNC: 359 MG/DL (ref 70–99)
GLUCOSE BLDC GLUCOMTR-MCNC: 363 MG/DL (ref 70–99)
GLUCOSE BLDC GLUCOMTR-MCNC: 369 MG/DL (ref 70–99)
GLUCOSE BLDC GLUCOMTR-MCNC: 379 MG/DL (ref 70–99)
GLUCOSE BLDC GLUCOMTR-MCNC: 379 MG/DL (ref 70–99)
GLUCOSE BLDC GLUCOMTR-MCNC: 380 MG/DL (ref 70–99)
GLUCOSE BLDC GLUCOMTR-MCNC: 382 MG/DL (ref 70–99)
GLUCOSE BLDC GLUCOMTR-MCNC: 388 MG/DL (ref 70–99)
GLUCOSE BLDC GLUCOMTR-MCNC: 391 MG/DL (ref 70–99)
GLUCOSE BLDC GLUCOMTR-MCNC: 395 MG/DL (ref 70–99)
GLUCOSE BLDC GLUCOMTR-MCNC: 40 MG/DL (ref 70–99)
GLUCOSE BLDC GLUCOMTR-MCNC: 403 MG/DL (ref 70–99)
GLUCOSE BLDC GLUCOMTR-MCNC: 407 MG/DL (ref 70–99)
GLUCOSE BLDC GLUCOMTR-MCNC: 408 MG/DL (ref 70–99)
GLUCOSE BLDC GLUCOMTR-MCNC: 432 MG/DL (ref 70–99)
GLUCOSE BLDC GLUCOMTR-MCNC: 433 MG/DL (ref 70–99)
GLUCOSE BLDC GLUCOMTR-MCNC: 441 MG/DL (ref 70–99)
GLUCOSE BLDC GLUCOMTR-MCNC: 445 MG/DL (ref 70–99)
GLUCOSE BLDC GLUCOMTR-MCNC: 46 MG/DL (ref 70–99)
GLUCOSE BLDC GLUCOMTR-MCNC: 47 MG/DL (ref 70–99)
GLUCOSE BLDC GLUCOMTR-MCNC: 48 MG/DL (ref 70–99)
GLUCOSE BLDC GLUCOMTR-MCNC: 493 MG/DL (ref 70–99)
GLUCOSE BLDC GLUCOMTR-MCNC: 52 MG/DL (ref 70–99)
GLUCOSE BLDC GLUCOMTR-MCNC: 522 MG/DL (ref 70–99)
GLUCOSE BLDC GLUCOMTR-MCNC: 53 MG/DL (ref 70–99)
GLUCOSE BLDC GLUCOMTR-MCNC: 55 MG/DL (ref 70–99)
GLUCOSE BLDC GLUCOMTR-MCNC: 56 MG/DL (ref 70–99)
GLUCOSE BLDC GLUCOMTR-MCNC: 57 MG/DL (ref 70–99)
GLUCOSE BLDC GLUCOMTR-MCNC: 60 MG/DL (ref 70–99)
GLUCOSE BLDC GLUCOMTR-MCNC: 60 MG/DL (ref 70–99)
GLUCOSE BLDC GLUCOMTR-MCNC: 61 MG/DL (ref 70–99)
GLUCOSE BLDC GLUCOMTR-MCNC: 63 MG/DL (ref 70–99)
GLUCOSE BLDC GLUCOMTR-MCNC: 66 MG/DL (ref 70–99)
GLUCOSE BLDC GLUCOMTR-MCNC: 67 MG/DL (ref 70–99)
GLUCOSE BLDC GLUCOMTR-MCNC: 67 MG/DL (ref 70–99)
GLUCOSE BLDC GLUCOMTR-MCNC: 68 MG/DL (ref 70–99)
GLUCOSE BLDC GLUCOMTR-MCNC: 69 MG/DL (ref 70–99)
GLUCOSE BLDC GLUCOMTR-MCNC: 71 MG/DL (ref 70–99)
GLUCOSE BLDC GLUCOMTR-MCNC: 74 MG/DL (ref 70–99)
GLUCOSE BLDC GLUCOMTR-MCNC: 76 MG/DL (ref 70–99)
GLUCOSE BLDC GLUCOMTR-MCNC: 77 MG/DL (ref 70–99)
GLUCOSE BLDC GLUCOMTR-MCNC: 78 MG/DL (ref 70–99)
GLUCOSE BLDC GLUCOMTR-MCNC: 79 MG/DL (ref 70–99)
GLUCOSE BLDC GLUCOMTR-MCNC: 81 MG/DL (ref 70–99)
GLUCOSE BLDC GLUCOMTR-MCNC: 81 MG/DL (ref 70–99)
GLUCOSE BLDC GLUCOMTR-MCNC: 82 MG/DL (ref 70–99)
GLUCOSE BLDC GLUCOMTR-MCNC: 83 MG/DL (ref 70–99)
GLUCOSE BLDC GLUCOMTR-MCNC: 84 MG/DL (ref 70–99)
GLUCOSE BLDC GLUCOMTR-MCNC: 85 MG/DL (ref 70–99)
GLUCOSE BLDC GLUCOMTR-MCNC: 88 MG/DL (ref 70–99)
GLUCOSE BLDC GLUCOMTR-MCNC: 89 MG/DL (ref 70–99)
GLUCOSE BLDC GLUCOMTR-MCNC: 90 MG/DL (ref 70–99)
GLUCOSE BLDC GLUCOMTR-MCNC: 93 MG/DL (ref 70–99)
GLUCOSE BLDC GLUCOMTR-MCNC: 93 MG/DL (ref 70–99)
GLUCOSE BLDC GLUCOMTR-MCNC: 94 MG/DL (ref 70–99)
GLUCOSE BLDC GLUCOMTR-MCNC: 97 MG/DL (ref 70–99)
GLUCOSE BLDC GLUCOMTR-MCNC: 98 MG/DL (ref 70–99)
GLUCOSE BLDC GLUCOMTR-MCNC: 98 MG/DL (ref 70–99)
GLUCOSE BLDC GLUCOMTR-MCNC: 99 MG/DL (ref 70–99)
GLUCOSE SERPL-MCNC: 109 MG/DL (ref 70–99)
GLUCOSE SERPL-MCNC: 139 MG/DL (ref 70–99)
GLUCOSE SERPL-MCNC: 155 MG/DL (ref 70–99)
GLUCOSE SERPL-MCNC: 155 MG/DL (ref 70–99)
GLUCOSE SERPL-MCNC: 156 MG/DL (ref 70–99)
GLUCOSE SERPL-MCNC: 157 MG/DL (ref 70–99)
GLUCOSE SERPL-MCNC: 162 MG/DL (ref 70–99)
GLUCOSE SERPL-MCNC: 176 MG/DL (ref 70–99)
GLUCOSE SERPL-MCNC: 188 MG/DL (ref 70–99)
GLUCOSE SERPL-MCNC: 192 MG/DL (ref 70–99)
GLUCOSE SERPL-MCNC: 199 MG/DL (ref 70–99)
GLUCOSE SERPL-MCNC: 226 MG/DL (ref 70–99)
GLUCOSE SERPL-MCNC: 245 MG/DL (ref 70–99)
GLUCOSE SERPL-MCNC: 253 MG/DL (ref 70–99)
GLUCOSE SERPL-MCNC: 253 MG/DL (ref 70–99)
GLUCOSE SERPL-MCNC: 288 MG/DL (ref 70–99)
GLUCOSE SERPL-MCNC: 307 MG/DL (ref 70–99)
GLUCOSE SERPL-MCNC: 309 MG/DL (ref 70–99)
GLUCOSE SERPL-MCNC: 331 MG/DL (ref 70–99)
GLUCOSE SERPL-MCNC: 339 MG/DL (ref 70–99)
GLUCOSE SERPL-MCNC: 347 MG/DL (ref 70–99)
GLUCOSE SERPL-MCNC: 365 MG/DL (ref 70–99)
GLUCOSE SERPL-MCNC: 387 MG/DL (ref 70–99)
GLUCOSE SERPL-MCNC: 389 MG/DL (ref 70–99)
GLUCOSE SERPL-MCNC: 391 MG/DL (ref 70–99)
GLUCOSE SERPL-MCNC: 397 MG/DL (ref 70–99)
GLUCOSE SERPL-MCNC: 424 MG/DL (ref 70–99)
GLUCOSE SERPL-MCNC: 443 MG/DL (ref 70–99)
GLUCOSE SERPL-MCNC: 473 MG/DL (ref 70–99)
GLUCOSE SERPL-MCNC: 593 MG/DL (ref 70–99)
GLUCOSE UR STRIP-MCNC: 200 MG/DL
GLUCOSE UR STRIP-MCNC: >=1000 MG/DL
HBA1C MFR BLD: 7.7 %
HBA1C MFR BLD: 9 %
HCO3 BLDV-SCNC: 30 MMOL/L (ref 21–28)
HCT VFR BLD AUTO: 25.4 % (ref 40–53)
HCT VFR BLD AUTO: 26.1 % (ref 40–53)
HCT VFR BLD AUTO: 26.3 % (ref 40–53)
HCT VFR BLD AUTO: 26.9 % (ref 40–53)
HCT VFR BLD AUTO: 27.6 % (ref 40–53)
HCT VFR BLD AUTO: 27.8 % (ref 40–53)
HCT VFR BLD AUTO: 27.8 % (ref 40–53)
HCT VFR BLD AUTO: 28.1 % (ref 40–53)
HCT VFR BLD AUTO: 29.6 % (ref 40–53)
HCT VFR BLD AUTO: 30.3 % (ref 40–53)
HCT VFR BLD AUTO: 30.9 % (ref 40–53)
HCT VFR BLD AUTO: 31.3 % (ref 40–53)
HCT VFR BLD AUTO: 31.3 % (ref 40–53)
HCT VFR BLD AUTO: 31.5 % (ref 40–53)
HCT VFR BLD AUTO: 31.7 % (ref 40–53)
HCT VFR BLD AUTO: 32 % (ref 40–53)
HCT VFR BLD AUTO: 33.3 % (ref 40–53)
HCT VFR BLD AUTO: 33.4 % (ref 40–53)
HGB BLD-MCNC: 10 G/DL (ref 13.3–17.7)
HGB BLD-MCNC: 10.1 G/DL (ref 13.3–17.7)
HGB BLD-MCNC: 10.1 G/DL (ref 13.3–17.7)
HGB BLD-MCNC: 10.6 G/DL (ref 13.3–17.7)
HGB BLD-MCNC: 7 G/DL (ref 13.3–17.7)
HGB BLD-MCNC: 7.7 G/DL (ref 13.3–17.7)
HGB BLD-MCNC: 7.9 G/DL (ref 13.3–17.7)
HGB BLD-MCNC: 8.1 G/DL (ref 13.3–17.7)
HGB BLD-MCNC: 8.2 G/DL (ref 13.3–17.7)
HGB BLD-MCNC: 8.6 G/DL (ref 13.3–17.7)
HGB BLD-MCNC: 8.7 G/DL (ref 13.3–17.7)
HGB BLD-MCNC: 8.8 G/DL (ref 13.3–17.7)
HGB BLD-MCNC: 9 G/DL (ref 13.3–17.7)
HGB BLD-MCNC: 9 G/DL (ref 13.3–17.7)
HGB BLD-MCNC: 9.2 G/DL (ref 13.3–17.7)
HGB BLD-MCNC: 9.2 G/DL (ref 13.3–17.7)
HGB BLD-MCNC: 9.4 G/DL (ref 13.3–17.7)
HGB BLD-MCNC: 9.5 G/DL (ref 13.3–17.7)
HGB BLD-MCNC: 9.6 G/DL (ref 13.3–17.7)
HGB BLD-MCNC: 9.7 G/DL (ref 13.3–17.7)
HGB BLD-MCNC: 9.8 G/DL (ref 13.3–17.7)
HGB BLD-MCNC: 9.8 G/DL (ref 13.3–17.7)
HGB UR QL STRIP: ABNORMAL
HGB UR QL STRIP: NEGATIVE
HOLD SPECIMEN: NORMAL
IMM GRANULOCYTES # BLD: 0 10E3/UL
IMM GRANULOCYTES # BLD: 0.1 10E3/UL
IMM GRANULOCYTES # BLD: 0.2 10E3/UL
IMM GRANULOCYTES NFR BLD: 0 %
IMM GRANULOCYTES NFR BLD: 1 %
INR PPP: 1.25 (ref 0.85–1.15)
INTERPRETATION ECG - MUSE: NORMAL
IRON BINDING CAPACITY (ROCHE): 235 UG/DL (ref 240–430)
IRON SATN MFR SERPL: 6 % (ref 15–46)
IRON SERPL-MCNC: 14 UG/DL (ref 61–157)
ISSUE DATE AND TIME: NORMAL
ISSUE DATE AND TIME: NORMAL
KETONES UR STRIP-MCNC: NEGATIVE MG/DL
LACTATE BLD-SCNC: 0.7 MMOL/L
LACTATE SERPL-SCNC: 1.9 MMOL/L (ref 0.7–2)
LEUKOCYTE ESTERASE UR QL STRIP: ABNORMAL
LEUKOCYTE ESTERASE UR QL STRIP: NEGATIVE
LVEF ECHO: NORMAL
LYMPHOCYTES # BLD AUTO: 0.2 10E3/UL (ref 0.8–5.3)
LYMPHOCYTES # BLD AUTO: 0.5 10E3/UL (ref 0.8–5.3)
LYMPHOCYTES # BLD AUTO: 0.6 10E3/UL (ref 0.8–5.3)
LYMPHOCYTES # BLD AUTO: 0.6 10E3/UL (ref 0.8–5.3)
LYMPHOCYTES # BLD AUTO: 0.7 10E3/UL (ref 0.8–5.3)
LYMPHOCYTES # BLD AUTO: 0.8 10E3/UL (ref 0.8–5.3)
LYMPHOCYTES # BLD AUTO: 0.9 10E3/UL (ref 0.8–5.3)
LYMPHOCYTES NFR BLD AUTO: 1 %
LYMPHOCYTES NFR BLD AUTO: 11 %
LYMPHOCYTES NFR BLD AUTO: 12 %
LYMPHOCYTES NFR BLD AUTO: 13 %
LYMPHOCYTES NFR BLD AUTO: 13 %
LYMPHOCYTES NFR BLD AUTO: 4 %
LYMPHOCYTES NFR BLD AUTO: 6 %
LYMPHOCYTES NFR BLD AUTO: 6 %
LYMPHOCYTES NFR BLD AUTO: 7 %
LYMPHOCYTES NFR BLD AUTO: 8 %
LYMPHOCYTES NFR BLD AUTO: 8 %
LYMPHOCYTES NFR BLD AUTO: 9 %
MCH RBC QN AUTO: 27.3 PG (ref 26.5–33)
MCH RBC QN AUTO: 27.3 PG (ref 26.5–33)
MCH RBC QN AUTO: 27.4 PG (ref 26.5–33)
MCH RBC QN AUTO: 27.4 PG (ref 26.5–33)
MCH RBC QN AUTO: 27.5 PG (ref 26.5–33)
MCH RBC QN AUTO: 27.5 PG (ref 26.5–33)
MCH RBC QN AUTO: 27.6 PG (ref 26.5–33)
MCH RBC QN AUTO: 27.7 PG (ref 26.5–33)
MCH RBC QN AUTO: 28 PG (ref 26.5–33)
MCH RBC QN AUTO: 28.1 PG (ref 26.5–33)
MCH RBC QN AUTO: 28.3 PG (ref 26.5–33)
MCH RBC QN AUTO: 28.3 PG (ref 26.5–33)
MCH RBC QN AUTO: 28.4 PG (ref 26.5–33)
MCH RBC QN AUTO: 28.4 PG (ref 26.5–33)
MCH RBC QN AUTO: 28.5 PG (ref 26.5–33)
MCH RBC QN AUTO: 28.6 PG (ref 26.5–33)
MCHC RBC AUTO-ENTMCNC: 29.8 G/DL (ref 31.5–36.5)
MCHC RBC AUTO-ENTMCNC: 29.9 G/DL (ref 31.5–36.5)
MCHC RBC AUTO-ENTMCNC: 30 G/DL (ref 31.5–36.5)
MCHC RBC AUTO-ENTMCNC: 30.3 G/DL (ref 31.5–36.5)
MCHC RBC AUTO-ENTMCNC: 30.3 G/DL (ref 31.5–36.5)
MCHC RBC AUTO-ENTMCNC: 30.7 G/DL (ref 31.5–36.5)
MCHC RBC AUTO-ENTMCNC: 31.1 G/DL (ref 31.5–36.5)
MCHC RBC AUTO-ENTMCNC: 31.3 G/DL (ref 31.5–36.5)
MCHC RBC AUTO-ENTMCNC: 31.3 G/DL (ref 31.5–36.5)
MCHC RBC AUTO-ENTMCNC: 31.4 G/DL (ref 31.5–36.5)
MCHC RBC AUTO-ENTMCNC: 31.4 G/DL (ref 31.5–36.5)
MCHC RBC AUTO-ENTMCNC: 31.5 G/DL (ref 31.5–36.5)
MCHC RBC AUTO-ENTMCNC: 31.7 G/DL (ref 31.5–36.5)
MCHC RBC AUTO-ENTMCNC: 31.8 G/DL (ref 31.5–36.5)
MCHC RBC AUTO-ENTMCNC: 31.9 G/DL (ref 31.5–36.5)
MCHC RBC AUTO-ENTMCNC: 32 G/DL (ref 31.5–36.5)
MCV RBC AUTO: 86 FL (ref 78–100)
MCV RBC AUTO: 87 FL (ref 78–100)
MCV RBC AUTO: 87 FL (ref 78–100)
MCV RBC AUTO: 89 FL (ref 78–100)
MCV RBC AUTO: 90 FL (ref 78–100)
MCV RBC AUTO: 92 FL (ref 78–100)
MONOCYTES # BLD AUTO: 0.5 10E3/UL (ref 0–1.3)
MONOCYTES # BLD AUTO: 0.6 10E3/UL (ref 0–1.3)
MONOCYTES # BLD AUTO: 0.6 10E3/UL (ref 0–1.3)
MONOCYTES # BLD AUTO: 0.7 10E3/UL (ref 0–1.3)
MONOCYTES # BLD AUTO: 0.8 10E3/UL (ref 0–1.3)
MONOCYTES # BLD AUTO: 0.8 10E3/UL (ref 0–1.3)
MONOCYTES # BLD AUTO: 0.9 10E3/UL (ref 0–1.3)
MONOCYTES # BLD AUTO: 0.9 10E3/UL (ref 0–1.3)
MONOCYTES # BLD AUTO: 1 10E3/UL (ref 0–1.3)
MONOCYTES # BLD AUTO: 1 10E3/UL (ref 0–1.3)
MONOCYTES # BLD AUTO: 1.3 10E3/UL (ref 0–1.3)
MONOCYTES # BLD AUTO: 1.7 10E3/UL (ref 0–1.3)
MONOCYTES NFR BLD AUTO: 10 %
MONOCYTES NFR BLD AUTO: 10 %
MONOCYTES NFR BLD AUTO: 11 %
MONOCYTES NFR BLD AUTO: 11 %
MONOCYTES NFR BLD AUTO: 12 %
MONOCYTES NFR BLD AUTO: 7 %
MONOCYTES NFR BLD AUTO: 7 %
MONOCYTES NFR BLD AUTO: 8 %
MONOCYTES NFR BLD AUTO: 9 %
MONOCYTES NFR BLD AUTO: 9 %
MUCOUS THREADS #/AREA URNS LPF: PRESENT /LPF
NEUTROPHILS # BLD AUTO: 10.3 10E3/UL (ref 1.6–8.3)
NEUTROPHILS # BLD AUTO: 12.2 10E3/UL (ref 1.6–8.3)
NEUTROPHILS # BLD AUTO: 18.4 10E3/UL (ref 1.6–8.3)
NEUTROPHILS # BLD AUTO: 4.4 10E3/UL (ref 1.6–8.3)
NEUTROPHILS # BLD AUTO: 4.6 10E3/UL (ref 1.6–8.3)
NEUTROPHILS # BLD AUTO: 5.7 10E3/UL (ref 1.6–8.3)
NEUTROPHILS # BLD AUTO: 5.8 10E3/UL (ref 1.6–8.3)
NEUTROPHILS # BLD AUTO: 6.6 10E3/UL (ref 1.6–8.3)
NEUTROPHILS # BLD AUTO: 6.7 10E3/UL (ref 1.6–8.3)
NEUTROPHILS # BLD AUTO: 8.4 10E3/UL (ref 1.6–8.3)
NEUTROPHILS NFR BLD AUTO: 65 %
NEUTROPHILS NFR BLD AUTO: 67 %
NEUTROPHILS NFR BLD AUTO: 71 %
NEUTROPHILS NFR BLD AUTO: 71 %
NEUTROPHILS NFR BLD AUTO: 72 %
NEUTROPHILS NFR BLD AUTO: 73 %
NEUTROPHILS NFR BLD AUTO: 75 %
NEUTROPHILS NFR BLD AUTO: 78 %
NEUTROPHILS NFR BLD AUTO: 82 %
NEUTROPHILS NFR BLD AUTO: 83 %
NEUTROPHILS NFR BLD AUTO: 83 %
NEUTROPHILS NFR BLD AUTO: 89 %
NITRATE UR QL: NEGATIVE
NRBC # BLD AUTO: 0 10E3/UL
NRBC BLD AUTO-RTO: 0 /100
NT-PROBNP SERPL-MCNC: 5202 PG/ML (ref 0–1800)
P AXIS - MUSE: NORMAL DEGREES
PCO2 BLDV: 54 MM HG (ref 40–50)
PH BLDV: 7.36 [PH] (ref 7.32–7.43)
PH UR STRIP: 6 [PH] (ref 5–7)
PH UR STRIP: 6.5 [PH] (ref 5–7)
PH UR STRIP: 6.5 [PH] (ref 5–7)
PH UR STRIP: 7 [PH] (ref 5–7)
PLATELET # BLD AUTO: 211 10E3/UL (ref 150–450)
PLATELET # BLD AUTO: 217 10E3/UL (ref 150–450)
PLATELET # BLD AUTO: 227 10E3/UL (ref 150–450)
PLATELET # BLD AUTO: 232 10E3/UL (ref 150–450)
PLATELET # BLD AUTO: 240 10E3/UL (ref 150–450)
PLATELET # BLD AUTO: 240 10E3/UL (ref 150–450)
PLATELET # BLD AUTO: 246 10E3/UL (ref 150–450)
PLATELET # BLD AUTO: 248 10E3/UL (ref 150–450)
PLATELET # BLD AUTO: 257 10E3/UL (ref 150–450)
PLATELET # BLD AUTO: 260 10E3/UL (ref 150–450)
PLATELET # BLD AUTO: 263 10E3/UL (ref 150–450)
PLATELET # BLD AUTO: 274 10E3/UL (ref 150–450)
PLATELET # BLD AUTO: 280 10E3/UL (ref 150–450)
PLATELET # BLD AUTO: 282 10E3/UL (ref 150–450)
PLATELET # BLD AUTO: 283 10E3/UL (ref 150–450)
PLATELET # BLD AUTO: 285 10E3/UL (ref 150–450)
PLATELET # BLD AUTO: 286 10E3/UL (ref 150–450)
PLATELET # BLD AUTO: 288 10E3/UL (ref 150–450)
PLATELET # BLD AUTO: 312 10E3/UL (ref 150–450)
PLATELET # BLD AUTO: 332 10E3/UL (ref 150–450)
PLATELET # BLD AUTO: 338 10E3/UL (ref 150–450)
PLATELET # BLD AUTO: 355 10E3/UL (ref 150–450)
PLATELET # BLD AUTO: ABNORMAL 10*3/UL
PO2 BLDV: 35 MM HG (ref 25–47)
POTASSIUM SERPL-SCNC: 3.7 MMOL/L (ref 3.4–5.3)
POTASSIUM SERPL-SCNC: 3.8 MMOL/L (ref 3.4–5.3)
POTASSIUM SERPL-SCNC: 3.9 MMOL/L (ref 3.4–5.3)
POTASSIUM SERPL-SCNC: 4 MMOL/L (ref 3.4–5.3)
POTASSIUM SERPL-SCNC: 4.1 MMOL/L (ref 3.4–5.3)
POTASSIUM SERPL-SCNC: 4.2 MMOL/L (ref 3.4–5.3)
POTASSIUM SERPL-SCNC: 4.2 MMOL/L (ref 3.4–5.3)
POTASSIUM SERPL-SCNC: 4.3 MMOL/L (ref 3.4–5.3)
POTASSIUM SERPL-SCNC: 4.3 MMOL/L (ref 3.4–5.3)
POTASSIUM SERPL-SCNC: 4.4 MMOL/L (ref 3.4–5.3)
POTASSIUM SERPL-SCNC: 4.4 MMOL/L (ref 3.4–5.3)
POTASSIUM SERPL-SCNC: 4.5 MMOL/L (ref 3.4–5.3)
POTASSIUM SERPL-SCNC: 4.5 MMOL/L (ref 3.4–5.3)
POTASSIUM SERPL-SCNC: 4.6 MMOL/L (ref 3.4–5.3)
POTASSIUM SERPL-SCNC: 4.7 MMOL/L (ref 3.4–5.3)
POTASSIUM SERPL-SCNC: 4.7 MMOL/L (ref 3.4–5.3)
POTASSIUM SERPL-SCNC: 4.8 MMOL/L (ref 3.4–5.3)
POTASSIUM SERPL-SCNC: 4.9 MMOL/L (ref 3.4–5.3)
POTASSIUM SERPL-SCNC: 5 MMOL/L (ref 3.4–5.3)
POTASSIUM SERPL-SCNC: 5 MMOL/L (ref 3.4–5.3)
POTASSIUM SERPL-SCNC: 5.2 MMOL/L (ref 3.4–5.3)
POTASSIUM SERPL-SCNC: 5.4 MMOL/L (ref 3.4–5.3)
PR INTERVAL - MUSE: NORMAL MS
PROCALCITONIN SERPL IA-MCNC: 1.06 NG/ML
PROT SERPL-MCNC: 5.8 G/DL (ref 6.4–8.3)
PROT SERPL-MCNC: 6.1 G/DL (ref 6.4–8.3)
PROT SERPL-MCNC: 6.3 G/DL (ref 6.4–8.3)
QRS DURATION - MUSE: 90 MS
QRS DURATION - MUSE: 92 MS
QRS DURATION - MUSE: 92 MS
QRS DURATION - MUSE: 94 MS
QRS DURATION - MUSE: 96 MS
QT - MUSE: 350 MS
QT - MUSE: 378 MS
QT - MUSE: 418 MS
QT - MUSE: 438 MS
QT - MUSE: 482 MS
QTC - MUSE: 375 MS
QTC - MUSE: 435 MS
QTC - MUSE: 459 MS
QTC - MUSE: 460 MS
QTC - MUSE: 469 MS
R AXIS - MUSE: 31 DEGREES
R AXIS - MUSE: 32 DEGREES
R AXIS - MUSE: 33 DEGREES
R AXIS - MUSE: 43 DEGREES
R AXIS - MUSE: 78 DEGREES
RBC # BLD AUTO: 2.82 10E6/UL (ref 4.4–5.9)
RBC # BLD AUTO: 2.85 10E6/UL (ref 4.4–5.9)
RBC # BLD AUTO: 2.9 10E6/UL (ref 4.4–5.9)
RBC # BLD AUTO: 3.06 10E6/UL (ref 4.4–5.9)
RBC # BLD AUTO: 3.13 10E6/UL (ref 4.4–5.9)
RBC # BLD AUTO: 3.14 10E6/UL (ref 4.4–5.9)
RBC # BLD AUTO: 3.19 10E6/UL (ref 4.4–5.9)
RBC # BLD AUTO: 3.21 10E6/UL (ref 4.4–5.9)
RBC # BLD AUTO: 3.22 10E6/UL (ref 4.4–5.9)
RBC # BLD AUTO: 3.38 10E6/UL (ref 4.4–5.9)
RBC # BLD AUTO: 3.42 10E6/UL (ref 4.4–5.9)
RBC # BLD AUTO: 3.43 10E6/UL (ref 4.4–5.9)
RBC # BLD AUTO: 3.45 10E6/UL (ref 4.4–5.9)
RBC # BLD AUTO: 3.49 10E6/UL (ref 4.4–5.9)
RBC # BLD AUTO: 3.54 10E6/UL (ref 4.4–5.9)
RBC # BLD AUTO: 3.55 10E6/UL (ref 4.4–5.9)
RBC # BLD AUTO: 3.64 10E6/UL (ref 4.4–5.9)
RBC # BLD AUTO: 3.75 10E6/UL (ref 4.4–5.9)
RBC URINE: 1 /HPF
RBC URINE: 2 /HPF
RBC URINE: 5 /HPF
RBC URINE: <1 /HPF
RSV RNA SPEC NAA+PROBE: NEGATIVE
SAO2 % BLDV: 63 % (ref 70–75)
SARS-COV-2 RNA RESP QL NAA+PROBE: NEGATIVE
SODIUM SERPL-SCNC: 135 MMOL/L (ref 135–145)
SODIUM SERPL-SCNC: 136 MMOL/L (ref 135–145)
SODIUM SERPL-SCNC: 137 MMOL/L (ref 135–145)
SODIUM SERPL-SCNC: 138 MMOL/L (ref 135–145)
SODIUM SERPL-SCNC: 139 MMOL/L (ref 135–145)
SODIUM SERPL-SCNC: 140 MMOL/L (ref 135–145)
SODIUM SERPL-SCNC: 141 MMOL/L (ref 135–145)
SODIUM SERPL-SCNC: 142 MMOL/L (ref 135–145)
SODIUM SERPL-SCNC: 142 MMOL/L (ref 135–145)
SODIUM SERPL-SCNC: 143 MMOL/L (ref 135–145)
SP GR UR STRIP: 1.01 (ref 1–1.03)
SPECIMEN EXPIRATION DATE: NORMAL
SPECIMEN EXPIRATION DATE: NORMAL
SQUAMOUS EPITHELIAL: 1 /HPF
SQUAMOUS EPITHELIAL: <1 /HPF
SQUAMOUS EPITHELIAL: <1 /HPF
SYSTOLIC BLOOD PRESSURE - MUSE: NORMAL MMHG
T AXIS - MUSE: -10 DEGREES
T AXIS - MUSE: -32 DEGREES
T AXIS - MUSE: -42 DEGREES
T AXIS - MUSE: -44 DEGREES
T AXIS - MUSE: 5 DEGREES
TROPONIN T SERPL HS-MCNC: 38 NG/L
TROPONIN T SERPL HS-MCNC: 39 NG/L
TROPONIN T SERPL HS-MCNC: 45 NG/L
TROPONIN T SERPL HS-MCNC: 46 NG/L
TROPONIN T SERPL HS-MCNC: 49 NG/L
TSH SERPL DL<=0.005 MIU/L-ACNC: 2.41 UIU/ML (ref 0.3–4.2)
UNIT ABO/RH: NORMAL
UNIT ABO/RH: NORMAL
UNIT NUMBER: NORMAL
UNIT NUMBER: NORMAL
UNIT STATUS: NORMAL
UNIT STATUS: NORMAL
UNIT TYPE ISBT: 5100
UNIT TYPE ISBT: 5100
UROBILINOGEN UR STRIP-MCNC: NORMAL MG/DL
VENTRICULAR RATE- MUSE: 57 BPM
VENTRICULAR RATE- MUSE: 66 BPM
VENTRICULAR RATE- MUSE: 69 BPM
VENTRICULAR RATE- MUSE: 73 BPM
VENTRICULAR RATE- MUSE: 80 BPM
VIT D+METAB SERPL-MCNC: 19 NG/ML (ref 20–50)
WBC # BLD AUTO: 10.2 10E3/UL (ref 4–11)
WBC # BLD AUTO: 10.4 10E3/UL (ref 4–11)
WBC # BLD AUTO: 11.6 10E3/UL (ref 4–11)
WBC # BLD AUTO: 12.2 10E3/UL (ref 4–11)
WBC # BLD AUTO: 12.4 10E3/UL (ref 4–11)
WBC # BLD AUTO: 13.5 10E3/UL (ref 4–11)
WBC # BLD AUTO: 14.6 10E3/UL (ref 4–11)
WBC # BLD AUTO: 20.8 10E3/UL (ref 4–11)
WBC # BLD AUTO: 6.8 10E3/UL (ref 4–11)
WBC # BLD AUTO: 6.9 10E3/UL (ref 4–11)
WBC # BLD AUTO: 7.3 10E3/UL (ref 4–11)
WBC # BLD AUTO: 7.7 10E3/UL (ref 4–11)
WBC # BLD AUTO: 8 10E3/UL (ref 4–11)
WBC # BLD AUTO: 8.2 10E3/UL (ref 4–11)
WBC # BLD AUTO: 9.1 10E3/UL (ref 4–11)
WBC # BLD AUTO: 9.2 10E3/UL (ref 4–11)
WBC # BLD AUTO: 9.3 10E3/UL (ref 4–11)
WBC # BLD AUTO: 9.5 10E3/UL (ref 4–11)
WBC CLUMPS #/AREA URNS HPF: PRESENT /HPF
WBC URINE: 1 /HPF
WBC URINE: 2 /HPF
WBC URINE: <1 /HPF
WBC URINE: >182 /HPF

## 2024-01-01 PROCEDURE — 250N000013 HC RX MED GY IP 250 OP 250 PS 637

## 2024-01-01 PROCEDURE — 86923 COMPATIBILITY TEST ELECTRIC: CPT | Performed by: HOSPITALIST

## 2024-01-01 PROCEDURE — 97530 THERAPEUTIC ACTIVITIES: CPT | Mod: GP

## 2024-01-01 PROCEDURE — 99232 SBSQ HOSP IP/OBS MODERATE 35: CPT | Performed by: INTERNAL MEDICINE

## 2024-01-01 PROCEDURE — 999N000111 HC STATISTIC OT IP EVAL DEFER

## 2024-01-01 PROCEDURE — 250N000013 HC RX MED GY IP 250 OP 250 PS 637: Performed by: INTERNAL MEDICINE

## 2024-01-01 PROCEDURE — 97130 THER IVNTJ EA ADDL 15 MIN: CPT | Mod: GN

## 2024-01-01 PROCEDURE — 70450 CT HEAD/BRAIN W/O DYE: CPT

## 2024-01-01 PROCEDURE — 250N000011 HC RX IP 250 OP 636

## 2024-01-01 PROCEDURE — 87086 URINE CULTURE/COLONY COUNT: CPT | Performed by: EMERGENCY MEDICINE

## 2024-01-01 PROCEDURE — 82962 GLUCOSE BLOOD TEST: CPT

## 2024-01-01 PROCEDURE — 250N000013 HC RX MED GY IP 250 OP 250 PS 637: Performed by: PHYSICIAN ASSISTANT

## 2024-01-01 PROCEDURE — 97129 THER IVNTJ 1ST 15 MIN: CPT | Mod: GN | Performed by: SPEECH-LANGUAGE PATHOLOGIST

## 2024-01-01 PROCEDURE — 72125 CT NECK SPINE W/O DYE: CPT

## 2024-01-01 PROCEDURE — 93451 RIGHT HEART CATH: CPT | Mod: 26 | Performed by: INTERNAL MEDICINE

## 2024-01-01 PROCEDURE — 250N000012 HC RX MED GY IP 250 OP 636 PS 637: Performed by: INTERNAL MEDICINE

## 2024-01-01 PROCEDURE — 999N000125 HC STATISTIC PATIENT MED CONFERENCE < 30 MIN

## 2024-01-01 PROCEDURE — 250N000013 HC RX MED GY IP 250 OP 250 PS 637: Performed by: ORTHOPAEDIC SURGERY

## 2024-01-01 PROCEDURE — 99231 SBSQ HOSP IP/OBS SF/LOW 25: CPT | Performed by: PHYSICAL MEDICINE & REHABILITATION

## 2024-01-01 PROCEDURE — 97161 PT EVAL LOW COMPLEX 20 MIN: CPT | Mod: GP

## 2024-01-01 PROCEDURE — C1894 INTRO/SHEATH, NON-LASER: HCPCS | Performed by: INTERNAL MEDICINE

## 2024-01-01 PROCEDURE — 250N000013 HC RX MED GY IP 250 OP 250 PS 637: Performed by: PHYSICAL MEDICINE & REHABILITATION

## 2024-01-01 PROCEDURE — 97110 THERAPEUTIC EXERCISES: CPT | Mod: GP

## 2024-01-01 PROCEDURE — 92526 ORAL FUNCTION THERAPY: CPT | Mod: GN

## 2024-01-01 PROCEDURE — 97130 THER IVNTJ EA ADDL 15 MIN: CPT | Mod: GN | Performed by: SPEECH-LANGUAGE PATHOLOGIST

## 2024-01-01 PROCEDURE — 250N000011 HC RX IP 250 OP 636: Performed by: NURSE ANESTHETIST, CERTIFIED REGISTERED

## 2024-01-01 PROCEDURE — 36415 COLL VENOUS BLD VENIPUNCTURE: CPT

## 2024-01-01 PROCEDURE — 85049 AUTOMATED PLATELET COUNT: CPT

## 2024-01-01 PROCEDURE — 250N000013 HC RX MED GY IP 250 OP 250 PS 637: Performed by: STUDENT IN AN ORGANIZED HEALTH CARE EDUCATION/TRAINING PROGRAM

## 2024-01-01 PROCEDURE — 82550 ASSAY OF CK (CPK): CPT | Performed by: EMERGENCY MEDICINE

## 2024-01-01 PROCEDURE — 81001 URINALYSIS AUTO W/SCOPE: CPT | Performed by: EMERGENCY MEDICINE

## 2024-01-01 PROCEDURE — 258N000003 HC RX IP 258 OP 636

## 2024-01-01 PROCEDURE — 82306 VITAMIN D 25 HYDROXY: CPT | Performed by: PHYSICIAN ASSISTANT

## 2024-01-01 PROCEDURE — 99223 1ST HOSP IP/OBS HIGH 75: CPT | Performed by: PHYSICIAN ASSISTANT

## 2024-01-01 PROCEDURE — 99232 SBSQ HOSP IP/OBS MODERATE 35: CPT | Mod: GC | Performed by: PHYSICAL MEDICINE & REHABILITATION

## 2024-01-01 PROCEDURE — 99233 SBSQ HOSP IP/OBS HIGH 50: CPT | Performed by: NURSE PRACTITIONER

## 2024-01-01 PROCEDURE — 36415 COLL VENOUS BLD VENIPUNCTURE: CPT | Performed by: HOSPITALIST

## 2024-01-01 PROCEDURE — 999N000125 HC STATISTIC PATIENT MED CONFERENCE < 30 MIN: Performed by: OCCUPATIONAL THERAPIST

## 2024-01-01 PROCEDURE — 999N000150 HC STATISTIC PT MED CONFERENCE < 30 MIN

## 2024-01-01 PROCEDURE — 250N000011 HC RX IP 250 OP 636: Performed by: PHYSICIAN ASSISTANT

## 2024-01-01 PROCEDURE — 250N000009 HC RX 250: Performed by: NURSE ANESTHETIST, CERTIFIED REGISTERED

## 2024-01-01 PROCEDURE — 86923 COMPATIBILITY TEST ELECTRIC: CPT | Performed by: INTERNAL MEDICINE

## 2024-01-01 PROCEDURE — 250N000012 HC RX MED GY IP 250 OP 636 PS 637: Performed by: NURSE PRACTITIONER

## 2024-01-01 PROCEDURE — 85025 COMPLETE CBC W/AUTO DIFF WBC: CPT

## 2024-01-01 PROCEDURE — 84484 ASSAY OF TROPONIN QUANT: CPT | Performed by: INTERNAL MEDICINE

## 2024-01-01 PROCEDURE — 99232 SBSQ HOSP IP/OBS MODERATE 35: CPT | Performed by: PHYSICIAN ASSISTANT

## 2024-01-01 PROCEDURE — 97112 NEUROMUSCULAR REEDUCATION: CPT | Mod: GP

## 2024-01-01 PROCEDURE — 85018 HEMOGLOBIN: CPT | Performed by: HOSPITALIST

## 2024-01-01 PROCEDURE — 258N000003 HC RX IP 258 OP 636: Performed by: NURSE ANESTHETIST, CERTIFIED REGISTERED

## 2024-01-01 PROCEDURE — 0SRS01A REPLACEMENT OF LEFT HIP JOINT, FEMORAL SURFACE WITH METAL SYNTHETIC SUBSTITUTE, UNCEMENTED, OPEN APPROACH: ICD-10-PCS | Performed by: ORTHOPAEDIC SURGERY

## 2024-01-01 PROCEDURE — 85049 AUTOMATED PLATELET COUNT: CPT | Performed by: INTERNAL MEDICINE

## 2024-01-01 PROCEDURE — 96360 HYDRATION IV INFUSION INIT: CPT

## 2024-01-01 PROCEDURE — 85027 COMPLETE CBC AUTOMATED: CPT | Performed by: HOSPITALIST

## 2024-01-01 PROCEDURE — 80048 BASIC METABOLIC PNL TOTAL CA: CPT

## 2024-01-01 PROCEDURE — 97116 GAIT TRAINING THERAPY: CPT | Mod: GP | Performed by: PHYSICAL THERAPIST

## 2024-01-01 PROCEDURE — 83605 ASSAY OF LACTIC ACID: CPT | Performed by: SOCIAL WORKER

## 2024-01-01 PROCEDURE — 36415 COLL VENOUS BLD VENIPUNCTURE: CPT | Performed by: INTERNAL MEDICINE

## 2024-01-01 PROCEDURE — 99222 1ST HOSP IP/OBS MODERATE 55: CPT | Performed by: INTERNAL MEDICINE

## 2024-01-01 PROCEDURE — 93005 ELECTROCARDIOGRAM TRACING: CPT

## 2024-01-01 PROCEDURE — 120N000001 HC R&B MED SURG/OB

## 2024-01-01 PROCEDURE — 85018 HEMOGLOBIN: CPT

## 2024-01-01 PROCEDURE — 82565 ASSAY OF CREATININE: CPT | Performed by: EMERGENCY MEDICINE

## 2024-01-01 PROCEDURE — 99231 SBSQ HOSP IP/OBS SF/LOW 25: CPT | Performed by: STUDENT IN AN ORGANIZED HEALTH CARE EDUCATION/TRAINING PROGRAM

## 2024-01-01 PROCEDURE — 250N000011 HC RX IP 250 OP 636: Performed by: PHYSICAL MEDICINE & REHABILITATION

## 2024-01-01 PROCEDURE — 87637 SARSCOV2&INF A&B&RSV AMP PRB: CPT | Performed by: SOCIAL WORKER

## 2024-01-01 PROCEDURE — 96361 HYDRATE IV INFUSION ADD-ON: CPT

## 2024-01-01 PROCEDURE — 128N000003 HC R&B REHAB

## 2024-01-01 PROCEDURE — 97110 THERAPEUTIC EXERCISES: CPT | Mod: GP | Performed by: PHYSICAL THERAPIST

## 2024-01-01 PROCEDURE — 97112 NEUROMUSCULAR REEDUCATION: CPT | Mod: GP | Performed by: PHYSICAL THERAPIST

## 2024-01-01 PROCEDURE — 97129 THER IVNTJ 1ST 15 MIN: CPT | Mod: GN

## 2024-01-01 PROCEDURE — 90791 PSYCH DIAGNOSTIC EVALUATION: CPT | Performed by: CLINICAL NEUROPSYCHOLOGIST

## 2024-01-01 PROCEDURE — 80048 BASIC METABOLIC PNL TOTAL CA: CPT | Performed by: INTERNAL MEDICINE

## 2024-01-01 PROCEDURE — 258N000001 HC RX 258: Performed by: PHYSICIAN ASSISTANT

## 2024-01-01 PROCEDURE — 99233 SBSQ HOSP IP/OBS HIGH 50: CPT | Performed by: INTERNAL MEDICINE

## 2024-01-01 PROCEDURE — 258N000003 HC RX IP 258 OP 636: Performed by: EMERGENCY MEDICINE

## 2024-01-01 PROCEDURE — 93010 ELECTROCARDIOGRAM REPORT: CPT | Performed by: INTERNAL MEDICINE

## 2024-01-01 PROCEDURE — 71046 X-RAY EXAM CHEST 2 VIEWS: CPT

## 2024-01-01 PROCEDURE — 97116 GAIT TRAINING THERAPY: CPT | Mod: GP | Performed by: REHABILITATION PRACTITIONER

## 2024-01-01 PROCEDURE — 82374 ASSAY BLOOD CARBON DIOXIDE: CPT | Performed by: INTERNAL MEDICINE

## 2024-01-01 PROCEDURE — 99215 OFFICE O/P EST HI 40 MIN: CPT | Performed by: PHYSICIAN ASSISTANT

## 2024-01-01 PROCEDURE — 36415 COLL VENOUS BLD VENIPUNCTURE: CPT | Performed by: EMERGENCY MEDICINE

## 2024-01-01 PROCEDURE — 250N000012 HC RX MED GY IP 250 OP 636 PS 637: Performed by: HOSPITALIST

## 2024-01-01 PROCEDURE — 36415 COLL VENOUS BLD VENIPUNCTURE: CPT | Mod: ORL

## 2024-01-01 PROCEDURE — 85610 PROTHROMBIN TIME: CPT | Performed by: PHYSICIAN ASSISTANT

## 2024-01-01 PROCEDURE — 36415 COLL VENOUS BLD VENIPUNCTURE: CPT | Mod: ORL | Performed by: NURSE PRACTITIONER

## 2024-01-01 PROCEDURE — 82565 ASSAY OF CREATININE: CPT | Performed by: PHYSICAL MEDICINE & REHABILITATION

## 2024-01-01 PROCEDURE — 11721 DEBRIDE NAIL 6 OR MORE: CPT | Mod: Q8 | Performed by: PODIATRIST

## 2024-01-01 PROCEDURE — 80048 BASIC METABOLIC PNL TOTAL CA: CPT | Performed by: PHYSICIAN ASSISTANT

## 2024-01-01 PROCEDURE — 99233 SBSQ HOSP IP/OBS HIGH 50: CPT | Performed by: HOSPITALIST

## 2024-01-01 PROCEDURE — 97129 THER IVNTJ 1ST 15 MIN: CPT | Mod: GN | Performed by: REHABILITATION PRACTITIONER

## 2024-01-01 PROCEDURE — 97130 THER IVNTJ EA ADDL 15 MIN: CPT | Mod: GN | Performed by: REHABILITATION PRACTITIONER

## 2024-01-01 PROCEDURE — 97530 THERAPEUTIC ACTIVITIES: CPT | Mod: GP | Performed by: REHABILITATION PRACTITIONER

## 2024-01-01 PROCEDURE — 80048 BASIC METABOLIC PNL TOTAL CA: CPT | Performed by: HOSPITALIST

## 2024-01-01 PROCEDURE — 36415 COLL VENOUS BLD VENIPUNCTURE: CPT | Performed by: PHYSICIAN ASSISTANT

## 2024-01-01 PROCEDURE — 71250 CT THORAX DX C-: CPT

## 2024-01-01 PROCEDURE — 84484 ASSAY OF TROPONIN QUANT: CPT | Performed by: SOCIAL WORKER

## 2024-01-01 PROCEDURE — 250N000011 HC RX IP 250 OP 636: Performed by: ORTHOPAEDIC SURGERY

## 2024-01-01 PROCEDURE — 97535 SELF CARE MNGMENT TRAINING: CPT | Mod: GO | Performed by: OCCUPATIONAL THERAPIST

## 2024-01-01 PROCEDURE — 250N000011 HC RX IP 250 OP 636: Performed by: INTERNAL MEDICINE

## 2024-01-01 PROCEDURE — 99232 SBSQ HOSP IP/OBS MODERATE 35: CPT | Performed by: NURSE PRACTITIONER

## 2024-01-01 PROCEDURE — 250N000011 HC RX IP 250 OP 636: Performed by: HOSPITALIST

## 2024-01-01 PROCEDURE — C1776 JOINT DEVICE (IMPLANTABLE): HCPCS | Performed by: ORTHOPAEDIC SURGERY

## 2024-01-01 PROCEDURE — 85018 HEMOGLOBIN: CPT | Performed by: INTERNAL MEDICINE

## 2024-01-01 PROCEDURE — 999N000065 XR PELVIS AND HIP LEFT 1 VIEW

## 2024-01-01 PROCEDURE — 83550 IRON BINDING TEST: CPT | Performed by: INTERNAL MEDICINE

## 2024-01-01 PROCEDURE — 250N000013 HC RX MED GY IP 250 OP 250 PS 637: Performed by: HOSPITALIST

## 2024-01-01 PROCEDURE — P9603 ONE-WAY ALLOW PRORATED MILES: HCPCS | Mod: ORL

## 2024-01-01 PROCEDURE — P9603 ONE-WAY ALLOW PRORATED MILES: HCPCS | Mod: ORL | Performed by: NURSE PRACTITIONER

## 2024-01-01 PROCEDURE — 99231 SBSQ HOSP IP/OBS SF/LOW 25: CPT | Mod: GC | Performed by: PHYSICAL MEDICINE & REHABILITATION

## 2024-01-01 PROCEDURE — 82728 ASSAY OF FERRITIN: CPT | Performed by: INTERNAL MEDICINE

## 2024-01-01 PROCEDURE — 97535 SELF CARE MNGMENT TRAINING: CPT | Mod: GO

## 2024-01-01 PROCEDURE — 85025 COMPLETE CBC W/AUTO DIFF WBC: CPT | Mod: ORL | Performed by: NURSE PRACTITIONER

## 2024-01-01 PROCEDURE — 93451 RIGHT HEART CATH: CPT | Performed by: INTERNAL MEDICINE

## 2024-01-01 PROCEDURE — 83880 ASSAY OF NATRIURETIC PEPTIDE: CPT | Performed by: SOCIAL WORKER

## 2024-01-01 PROCEDURE — 85004 AUTOMATED DIFF WBC COUNT: CPT

## 2024-01-01 PROCEDURE — 258N000003 HC RX IP 258 OP 636: Performed by: INTERNAL MEDICINE

## 2024-01-01 PROCEDURE — 97150 GROUP THERAPEUTIC PROCEDURES: CPT | Mod: GP | Performed by: PHYSICAL THERAPIST

## 2024-01-01 PROCEDURE — 370N000017 HC ANESTHESIA TECHNICAL FEE, PER MIN: Performed by: ORTHOPAEDIC SURGERY

## 2024-01-01 PROCEDURE — G0378 HOSPITAL OBSERVATION PER HR: HCPCS

## 2024-01-01 PROCEDURE — 99232 SBSQ HOSP IP/OBS MODERATE 35: CPT | Performed by: CLINICAL NURSE SPECIALIST

## 2024-01-01 PROCEDURE — 99310 SBSQ NF CARE HIGH MDM 45: CPT

## 2024-01-01 PROCEDURE — 96374 THER/PROPH/DIAG INJ IV PUSH: CPT

## 2024-01-01 PROCEDURE — 99284 EMERGENCY DEPT VISIT MOD MDM: CPT | Mod: 25

## 2024-01-01 PROCEDURE — 258N000003 HC RX IP 258 OP 636: Performed by: ANESTHESIOLOGY

## 2024-01-01 PROCEDURE — C1713 ANCHOR/SCREW BN/BN,TIS/BN: HCPCS | Performed by: ORTHOPAEDIC SURGERY

## 2024-01-01 PROCEDURE — 80048 BASIC METABOLIC PNL TOTAL CA: CPT | Performed by: EMERGENCY MEDICINE

## 2024-01-01 PROCEDURE — 99239 HOSP IP/OBS DSCHRG MGMT >30: CPT | Performed by: INTERNAL MEDICINE

## 2024-01-01 PROCEDURE — C1751 CATH, INF, PER/CENT/MIDLINE: HCPCS | Performed by: INTERNAL MEDICINE

## 2024-01-01 PROCEDURE — 97530 THERAPEUTIC ACTIVITIES: CPT | Mod: GP | Performed by: PHYSICAL THERAPIST

## 2024-01-01 PROCEDURE — 80053 COMPREHEN METABOLIC PANEL: CPT | Mod: ORL | Performed by: NURSE PRACTITIONER

## 2024-01-01 PROCEDURE — 250N000009 HC RX 250: Performed by: ORTHOPAEDIC SURGERY

## 2024-01-01 PROCEDURE — 86900 BLOOD TYPING SEROLOGIC ABO: CPT | Performed by: PHYSICIAN ASSISTANT

## 2024-01-01 PROCEDURE — 83036 HEMOGLOBIN GLYCOSYLATED A1C: CPT | Mod: ORL | Performed by: NURSE PRACTITIONER

## 2024-01-01 PROCEDURE — 99239 HOSP IP/OBS DSCHRG MGMT >30: CPT | Performed by: HOSPITALIST

## 2024-01-01 PROCEDURE — 85048 AUTOMATED LEUKOCYTE COUNT: CPT | Performed by: EMERGENCY MEDICINE

## 2024-01-01 PROCEDURE — 72170 X-RAY EXAM OF PELVIS: CPT

## 2024-01-01 PROCEDURE — 82565 ASSAY OF CREATININE: CPT

## 2024-01-01 PROCEDURE — 99232 SBSQ HOSP IP/OBS MODERATE 35: CPT | Performed by: STUDENT IN AN ORGANIZED HEALTH CARE EDUCATION/TRAINING PROGRAM

## 2024-01-01 PROCEDURE — 80053 COMPREHEN METABOLIC PANEL: CPT | Performed by: EMERGENCY MEDICINE

## 2024-01-01 PROCEDURE — 250N000025 HC SEVOFLURANE, PER MIN: Performed by: ORTHOPAEDIC SURGERY

## 2024-01-01 PROCEDURE — 99207 PR NO BILLABLE SERVICE THIS VISIT: CPT | Performed by: INTERNAL MEDICINE

## 2024-01-01 PROCEDURE — 81001 URINALYSIS AUTO W/SCOPE: CPT | Performed by: SOCIAL WORKER

## 2024-01-01 PROCEDURE — 27134 REVISE HIP JOINT REPLACEMENT: CPT | Performed by: NURSE ANESTHETIST, CERTIFIED REGISTERED

## 2024-01-01 PROCEDURE — 96133 NRPSYC TST EVAL PHYS/QHP EA: CPT | Performed by: CLINICAL NEUROPSYCHOLOGIST

## 2024-01-01 PROCEDURE — 99232 SBSQ HOSP IP/OBS MODERATE 35: CPT | Performed by: HOSPITALIST

## 2024-01-01 PROCEDURE — 97110 THERAPEUTIC EXERCISES: CPT | Mod: GP | Performed by: REHABILITATION PRACTITIONER

## 2024-01-01 PROCEDURE — 73560 X-RAY EXAM OF KNEE 1 OR 2: CPT | Mod: LT

## 2024-01-01 PROCEDURE — 90832 PSYTX W PT 30 MINUTES: CPT | Performed by: PSYCHOLOGIST

## 2024-01-01 PROCEDURE — 99239 HOSP IP/OBS DSCHRG MGMT >30: CPT | Performed by: NURSE PRACTITIONER

## 2024-01-01 PROCEDURE — P9016 RBC LEUKOCYTES REDUCED: HCPCS | Performed by: INTERNAL MEDICINE

## 2024-01-01 PROCEDURE — 97116 GAIT TRAINING THERAPY: CPT | Mod: GP

## 2024-01-01 PROCEDURE — 999N000063 XR PELVIS AND HIP PORTABLE LEFT 1 VIEW

## 2024-01-01 PROCEDURE — 86900 BLOOD TYPING SEROLOGIC ABO: CPT | Performed by: ORTHOPAEDIC SURGERY

## 2024-01-01 PROCEDURE — 84145 PROCALCITONIN (PCT): CPT | Performed by: SOCIAL WORKER

## 2024-01-01 PROCEDURE — 99418 PROLNG IP/OBS E/M EA 15 MIN: CPT

## 2024-01-01 PROCEDURE — 85018 HEMOGLOBIN: CPT | Mod: ORL | Performed by: NURSE PRACTITIONER

## 2024-01-01 PROCEDURE — 99232 SBSQ HOSP IP/OBS MODERATE 35: CPT | Performed by: PHYSICAL MEDICINE & REHABILITATION

## 2024-01-01 PROCEDURE — 85027 COMPLETE CBC AUTOMATED: CPT | Performed by: INTERNAL MEDICINE

## 2024-01-01 PROCEDURE — 250N000011 HC RX IP 250 OP 636: Performed by: ANESTHESIOLOGY

## 2024-01-01 PROCEDURE — 96132 NRPSYC TST EVAL PHYS/QHP 1ST: CPT | Performed by: CLINICAL NEUROPSYCHOLOGIST

## 2024-01-01 PROCEDURE — 250N000009 HC RX 250: Performed by: INTERNAL MEDICINE

## 2024-01-01 PROCEDURE — 258N000001 HC RX 258: Performed by: INTERNAL MEDICINE

## 2024-01-01 PROCEDURE — 85049 AUTOMATED PLATELET COUNT: CPT | Performed by: PHYSICAL MEDICINE & REHABILITATION

## 2024-01-01 PROCEDURE — 73552 X-RAY EXAM OF FEMUR 2/>: CPT | Mod: LT

## 2024-01-01 PROCEDURE — 85025 COMPLETE CBC W/AUTO DIFF WBC: CPT | Performed by: EMERGENCY MEDICINE

## 2024-01-01 PROCEDURE — 85025 COMPLETE CBC W/AUTO DIFF WBC: CPT | Performed by: HOSPITALIST

## 2024-01-01 PROCEDURE — 99207 PR APP CREDIT; MD BILLING SHARED VISIT: CPT | Performed by: PHYSICIAN ASSISTANT

## 2024-01-01 PROCEDURE — 99100 ANES PT EXTEME AGE<1 YR&>70: CPT | Performed by: NURSE ANESTHETIST, CERTIFIED REGISTERED

## 2024-01-01 PROCEDURE — 999N000141 HC STATISTIC PRE-PROCEDURE NURSING ASSESSMENT: Performed by: ORTHOPAEDIC SURGERY

## 2024-01-01 PROCEDURE — 210N000001 HC R&B IMCU HEART CARE

## 2024-01-01 PROCEDURE — 0QP904Z REMOVAL OF INTERNAL FIXATION DEVICE FROM LEFT FEMORAL SHAFT, OPEN APPROACH: ICD-10-PCS | Performed by: ORTHOPAEDIC SURGERY

## 2024-01-01 PROCEDURE — 99285 EMERGENCY DEPT VISIT HI MDM: CPT | Mod: 25

## 2024-01-01 PROCEDURE — 97750 PHYSICAL PERFORMANCE TEST: CPT | Mod: GP

## 2024-01-01 PROCEDURE — 250N000013 HC RX MED GY IP 250 OP 250 PS 637: Performed by: EMERGENCY MEDICINE

## 2024-01-01 PROCEDURE — 82010 KETONE BODYS QUAN: CPT | Performed by: EMERGENCY MEDICINE

## 2024-01-01 PROCEDURE — 250N000013 HC RX MED GY IP 250 OP 250 PS 637: Performed by: SOCIAL WORKER

## 2024-01-01 PROCEDURE — 360N000078 HC SURGERY LEVEL 5, PER MIN: Performed by: ORTHOPAEDIC SURGERY

## 2024-01-01 PROCEDURE — 82565 ASSAY OF CREATININE: CPT | Performed by: INTERNAL MEDICINE

## 2024-01-01 PROCEDURE — 85041 AUTOMATED RBC COUNT: CPT | Performed by: HOSPITALIST

## 2024-01-01 PROCEDURE — 80048 BASIC METABOLIC PNL TOTAL CA: CPT | Mod: ORL

## 2024-01-01 PROCEDURE — 82803 BLOOD GASES ANY COMBINATION: CPT

## 2024-01-01 PROCEDURE — 710N000009 HC RECOVERY PHASE 1, LEVEL 1, PER MIN: Performed by: ORTHOPAEDIC SURGERY

## 2024-01-01 PROCEDURE — 250N000011 HC RX IP 250 OP 636: Performed by: STUDENT IN AN ORGANIZED HEALTH CARE EDUCATION/TRAINING PROGRAM

## 2024-01-01 PROCEDURE — 72040 X-RAY EXAM NECK SPINE 2-3 VW: CPT

## 2024-01-01 PROCEDURE — 258N000003 HC RX IP 258 OP 636: Performed by: HOSPITALIST

## 2024-01-01 PROCEDURE — 93306 TTE W/DOPPLER COMPLETE: CPT | Mod: 26 | Performed by: INTERNAL MEDICINE

## 2024-01-01 PROCEDURE — 85027 COMPLETE CBC AUTOMATED: CPT | Performed by: PHYSICIAN ASSISTANT

## 2024-01-01 PROCEDURE — P9016 RBC LEUKOCYTES REDUCED: HCPCS | Performed by: HOSPITALIST

## 2024-01-01 PROCEDURE — 83605 ASSAY OF LACTIC ACID: CPT | Performed by: EMERGENCY MEDICINE

## 2024-01-01 PROCEDURE — 258N000001 HC RX 258: Performed by: ORTHOPAEDIC SURGERY

## 2024-01-01 PROCEDURE — 250N000013 HC RX MED GY IP 250 OP 250 PS 637: Performed by: NURSE ANESTHETIST, CERTIFIED REGISTERED

## 2024-01-01 PROCEDURE — 93971 EXTREMITY STUDY: CPT | Mod: LT

## 2024-01-01 PROCEDURE — 84443 ASSAY THYROID STIM HORMONE: CPT | Mod: ORL | Performed by: NURSE PRACTITIONER

## 2024-01-01 PROCEDURE — 80048 BASIC METABOLIC PNL TOTAL CA: CPT | Performed by: SOCIAL WORKER

## 2024-01-01 PROCEDURE — 99223 1ST HOSP IP/OBS HIGH 75: CPT | Mod: FS | Performed by: HOSPITALIST

## 2024-01-01 PROCEDURE — 97165 OT EVAL LOW COMPLEX 30 MIN: CPT | Mod: GO | Performed by: OCCUPATIONAL THERAPIST

## 2024-01-01 PROCEDURE — 36415 COLL VENOUS BLD VENIPUNCTURE: CPT | Performed by: SOCIAL WORKER

## 2024-01-01 PROCEDURE — 272N000001 HC OR GENERAL SUPPLY STERILE: Performed by: INTERNAL MEDICINE

## 2024-01-01 PROCEDURE — 83036 HEMOGLOBIN GLYCOSYLATED A1C: CPT | Performed by: EMERGENCY MEDICINE

## 2024-01-01 PROCEDURE — 85004 AUTOMATED DIFF WBC COUNT: CPT | Performed by: EMERGENCY MEDICINE

## 2024-01-01 PROCEDURE — 250N000012 HC RX MED GY IP 250 OP 636 PS 637: Performed by: CLINICAL NURSE SPECIALIST

## 2024-01-01 PROCEDURE — 93306 TTE W/DOPPLER COMPLETE: CPT

## 2024-01-01 PROCEDURE — 272N000001 HC OR GENERAL SUPPLY STERILE: Performed by: ORTHOPAEDIC SURGERY

## 2024-01-01 PROCEDURE — 999N000157 HC STATISTIC RCP TIME EA 10 MIN

## 2024-01-01 PROCEDURE — 36415 COLL VENOUS BLD VENIPUNCTURE: CPT | Performed by: PHYSICAL MEDICINE & REHABILITATION

## 2024-01-01 PROCEDURE — 4A023N6 MEASUREMENT OF CARDIAC SAMPLING AND PRESSURE, RIGHT HEART, PERCUTANEOUS APPROACH: ICD-10-PCS | Performed by: INTERNAL MEDICINE

## 2024-01-01 PROCEDURE — 250N000009 HC RX 250: Performed by: EMERGENCY MEDICINE

## 2024-01-01 PROCEDURE — 258N000001 HC RX 258: Performed by: HOSPITALIST

## 2024-01-01 PROCEDURE — 84132 ASSAY OF SERUM POTASSIUM: CPT | Mod: ORL | Performed by: NURSE PRACTITIONER

## 2024-01-01 PROCEDURE — 99239 HOSP IP/OBS DSCHRG MGMT >30: CPT | Mod: GC | Performed by: PHYSICAL MEDICINE & REHABILITATION

## 2024-01-01 PROCEDURE — 99231 SBSQ HOSP IP/OBS SF/LOW 25: CPT | Performed by: INTERNAL MEDICINE

## 2024-01-01 PROCEDURE — 250N000011 HC RX IP 250 OP 636: Performed by: SOCIAL WORKER

## 2024-01-01 PROCEDURE — 99223 1ST HOSP IP/OBS HIGH 75: CPT | Mod: 25 | Performed by: INTERNAL MEDICINE

## 2024-01-01 PROCEDURE — 97161 PT EVAL LOW COMPLEX 20 MIN: CPT | Mod: GP | Performed by: PHYSICAL THERAPIST

## 2024-01-01 PROCEDURE — 85025 COMPLETE CBC W/AUTO DIFF WBC: CPT | Performed by: SOCIAL WORKER

## 2024-01-01 PROCEDURE — 27134 REVISE HIP JOINT REPLACEMENT: CPT | Performed by: ANESTHESIOLOGY

## 2024-01-01 DEVICE — MODULAR HIP SYSTEM
Type: IMPLANTABLE DEVICE | Site: HIP | Status: FUNCTIONAL
Brand: RESTORATION

## 2024-01-01 DEVICE — BEADED CABLE AND SLEEVE SET
Type: IMPLANTABLE DEVICE | Site: HIP | Status: FUNCTIONAL
Brand: DALL-MILES

## 2024-01-01 DEVICE — NECK ADJUSTMENT SLEEVE
Type: IMPLANTABLE DEVICE | Site: HIP | Status: FUNCTIONAL
Brand: UNITRAX

## 2024-01-01 DEVICE — HEAD COMPONENT
Type: IMPLANTABLE DEVICE | Site: HIP | Status: FUNCTIONAL
Brand: UNITRAX

## 2024-01-01 RX ORDER — POLYETHYLENE GLYCOL 3350 17 G/17G
17 POWDER, FOR SOLUTION ORAL DAILY
DISCHARGE
Start: 2024-01-01

## 2024-01-01 RX ORDER — CEFTRIAXONE 1 G/1
1 INJECTION, POWDER, FOR SOLUTION INTRAMUSCULAR; INTRAVENOUS EVERY 24 HOURS
Status: DISCONTINUED | OUTPATIENT
Start: 2024-01-01 | End: 2024-01-01

## 2024-01-01 RX ORDER — INSULIN LISPRO 100 [IU]/ML
5 INJECTION, SOLUTION INTRAVENOUS; SUBCUTANEOUS
Status: DISCONTINUED | OUTPATIENT
Start: 2024-01-01 | End: 2024-01-01

## 2024-01-01 RX ORDER — LIDOCAINE 40 MG/G
CREAM TOPICAL
Status: DISCONTINUED | OUTPATIENT
Start: 2024-01-01 | End: 2024-01-01

## 2024-01-01 RX ORDER — RAMELTEON 8 MG/1
8 TABLET ORAL AT BEDTIME
Qty: 30 TABLET | Refills: 0 | Status: ON HOLD | OUTPATIENT
Start: 2024-01-01 | End: 2024-01-01

## 2024-01-01 RX ORDER — DEXTROSE MONOHYDRATE 25 G/50ML
25-50 INJECTION, SOLUTION INTRAVENOUS
Status: DISCONTINUED | OUTPATIENT
Start: 2024-01-01 | End: 2024-01-01

## 2024-01-01 RX ORDER — ACETAMINOPHEN 325 MG/1
975 TABLET ORAL EVERY 8 HOURS PRN
DISCHARGE
Start: 2024-01-01

## 2024-01-01 RX ORDER — MAGNESIUM HYDROXIDE 1200 MG/15ML
LIQUID ORAL PRN
Status: DISCONTINUED | OUTPATIENT
Start: 2024-01-01 | End: 2024-01-01 | Stop reason: HOSPADM

## 2024-01-01 RX ORDER — INSULIN LISPRO 100 [IU]/ML
3 INJECTION, SOLUTION INTRAVENOUS; SUBCUTANEOUS
COMMUNITY
End: 2024-01-01

## 2024-01-01 RX ORDER — BLOOD PRESSURE TEST KIT
KIT MISCELLANEOUS
Qty: 100 EACH | Refills: 0 | Status: SHIPPED | OUTPATIENT
Start: 2024-01-01

## 2024-01-01 RX ORDER — AMOXICILLIN 250 MG
2 CAPSULE ORAL 2 TIMES DAILY PRN
Status: DISCONTINUED | OUTPATIENT
Start: 2024-01-01 | End: 2024-01-01 | Stop reason: HOSPADM

## 2024-01-01 RX ORDER — CARVEDILOL 12.5 MG/1
12.5 TABLET ORAL 2 TIMES DAILY WITH MEALS
Qty: 60 TABLET | Refills: 0 | Status: SHIPPED | OUTPATIENT
Start: 2024-01-01 | End: 2024-01-01

## 2024-01-01 RX ORDER — DEXMEDETOMIDINE HYDROCHLORIDE 4 UG/ML
INJECTION, SOLUTION INTRAVENOUS PRN
Status: DISCONTINUED | OUTPATIENT
Start: 2024-01-01 | End: 2024-01-01

## 2024-01-01 RX ORDER — DIPHENHYDRAMINE HYDROCHLORIDE 50 MG/ML
50 INJECTION INTRAMUSCULAR; INTRAVENOUS
Status: DISCONTINUED | OUTPATIENT
Start: 2024-01-01 | End: 2024-01-01

## 2024-01-01 RX ORDER — SALIVA STIMULANT COMB. NO.3
1 SPRAY, NON-AEROSOL (ML) MUCOUS MEMBRANE 4 TIMES DAILY
Status: DISCONTINUED | OUTPATIENT
Start: 2024-01-01 | End: 2024-01-01 | Stop reason: HOSPADM

## 2024-01-01 RX ORDER — AMOXICILLIN 250 MG
1 CAPSULE ORAL 2 TIMES DAILY PRN
Status: DISCONTINUED | OUTPATIENT
Start: 2024-01-01 | End: 2024-01-01 | Stop reason: HOSPADM

## 2024-01-01 RX ORDER — NALOXONE HYDROCHLORIDE 0.4 MG/ML
0.2 INJECTION, SOLUTION INTRAMUSCULAR; INTRAVENOUS; SUBCUTANEOUS
Status: DISCONTINUED | OUTPATIENT
Start: 2024-01-01 | End: 2024-01-01 | Stop reason: HOSPADM

## 2024-01-01 RX ORDER — POLYETHYLENE GLYCOL 3350 17 G/17G
17 POWDER, FOR SOLUTION ORAL DAILY
Status: DISCONTINUED | OUTPATIENT
Start: 2024-01-01 | End: 2024-01-01 | Stop reason: HOSPADM

## 2024-01-01 RX ORDER — AMLODIPINE BESYLATE 5 MG/1
5 TABLET ORAL DAILY
Status: DISCONTINUED | OUTPATIENT
Start: 2024-01-01 | End: 2024-01-01

## 2024-01-01 RX ORDER — HYDROMORPHONE HCL IN WATER/PF 6 MG/30 ML
0.2 PATIENT CONTROLLED ANALGESIA SYRINGE INTRAVENOUS EVERY 5 MIN PRN
Status: DISCONTINUED | OUTPATIENT
Start: 2024-01-01 | End: 2024-01-01 | Stop reason: HOSPADM

## 2024-01-01 RX ORDER — ACETAMINOPHEN 325 MG/1
650 TABLET ORAL EVERY 4 HOURS PRN
Status: DISCONTINUED | OUTPATIENT
Start: 2024-01-01 | End: 2024-01-01 | Stop reason: HOSPADM

## 2024-01-01 RX ORDER — ONDANSETRON 4 MG/1
4 TABLET, ORALLY DISINTEGRATING ORAL EVERY 30 MIN PRN
Status: DISCONTINUED | OUTPATIENT
Start: 2024-01-01 | End: 2024-01-01 | Stop reason: HOSPADM

## 2024-01-01 RX ORDER — ENOXAPARIN SODIUM 100 MG/ML
40 INJECTION SUBCUTANEOUS EVERY 24 HOURS
Status: DISCONTINUED | OUTPATIENT
Start: 2024-01-01 | End: 2024-01-01

## 2024-01-01 RX ORDER — CARVEDILOL 6.25 MG/1
6.25 TABLET ORAL 2 TIMES DAILY WITH MEALS
COMMUNITY

## 2024-01-01 RX ORDER — TAMSULOSIN HYDROCHLORIDE 0.4 MG/1
0.4 CAPSULE ORAL EVERY MORNING
Status: DISCONTINUED | OUTPATIENT
Start: 2024-01-01 | End: 2024-01-01 | Stop reason: HOSPADM

## 2024-01-01 RX ORDER — DEXTROSE MONOHYDRATE 100 MG/ML
INJECTION, SOLUTION INTRAVENOUS CONTINUOUS PRN
Status: DISCONTINUED | OUTPATIENT
Start: 2024-01-01 | End: 2024-01-01 | Stop reason: HOSPADM

## 2024-01-01 RX ORDER — LIDOCAINE 4 G/G
1 PATCH TOPICAL
Status: DISCONTINUED | OUTPATIENT
Start: 2024-01-01 | End: 2024-01-01 | Stop reason: HOSPADM

## 2024-01-01 RX ORDER — CARVEDILOL 12.5 MG/1
12.5 TABLET ORAL 2 TIMES DAILY WITH MEALS
Status: DISCONTINUED | OUTPATIENT
Start: 2024-01-01 | End: 2024-01-01 | Stop reason: HOSPADM

## 2024-01-01 RX ORDER — NITROGLYCERIN 0.4 MG/1
0.4 TABLET SUBLINGUAL EVERY 5 MIN PRN
Status: DISCONTINUED | OUTPATIENT
Start: 2024-01-01 | End: 2024-01-01 | Stop reason: HOSPADM

## 2024-01-01 RX ORDER — PROCHLORPERAZINE 25 MG
12.5 SUPPOSITORY, RECTAL RECTAL EVERY 12 HOURS PRN
Status: DISCONTINUED | OUTPATIENT
Start: 2024-01-01 | End: 2024-01-01 | Stop reason: HOSPADM

## 2024-01-01 RX ORDER — CALCIUM CARBONATE 500 MG/1
1000 TABLET, CHEWABLE ORAL 4 TIMES DAILY PRN
Status: DISCONTINUED | OUTPATIENT
Start: 2024-01-01 | End: 2024-01-01 | Stop reason: HOSPADM

## 2024-01-01 RX ORDER — TORSEMIDE 5 MG/1
5 TABLET ORAL DAILY
DISCHARGE
Start: 2024-01-01

## 2024-01-01 RX ORDER — LIDOCAINE 40 MG/G
CREAM TOPICAL
Status: DISCONTINUED | OUTPATIENT
Start: 2024-01-01 | End: 2024-01-01 | Stop reason: HOSPADM

## 2024-01-01 RX ORDER — INSULIN LISPRO 100 [IU]/ML
4 INJECTION, SOLUTION INTRAVENOUS; SUBCUTANEOUS
Status: ON HOLD | COMMUNITY
End: 2024-01-01

## 2024-01-01 RX ORDER — SODIUM CHLORIDE, SODIUM LACTATE, POTASSIUM CHLORIDE, CALCIUM CHLORIDE 600; 310; 30; 20 MG/100ML; MG/100ML; MG/100ML; MG/100ML
INJECTION, SOLUTION INTRAVENOUS CONTINUOUS PRN
Status: DISCONTINUED | OUTPATIENT
Start: 2024-01-01 | End: 2024-01-01

## 2024-01-01 RX ORDER — NICOTINE POLACRILEX 4 MG
15-30 LOZENGE BUCCAL
Status: DISCONTINUED | OUTPATIENT
Start: 2024-01-01 | End: 2024-01-01 | Stop reason: HOSPADM

## 2024-01-01 RX ORDER — PROCHLORPERAZINE MALEATE 5 MG
5 TABLET ORAL EVERY 6 HOURS PRN
Status: DISCONTINUED | OUTPATIENT
Start: 2024-01-01 | End: 2024-01-01 | Stop reason: HOSPADM

## 2024-01-01 RX ORDER — LIDOCAINE 4 G/G
1 PATCH TOPICAL ONCE
Status: COMPLETED | OUTPATIENT
Start: 2024-01-01 | End: 2024-01-01

## 2024-01-01 RX ORDER — CEFAZOLIN SODIUM/WATER 2 G/20 ML
2 SYRINGE (ML) INTRAVENOUS
Status: COMPLETED | OUTPATIENT
Start: 2024-01-01 | End: 2024-01-01

## 2024-01-01 RX ORDER — ONDANSETRON 2 MG/ML
4 INJECTION INTRAMUSCULAR; INTRAVENOUS EVERY 30 MIN PRN
Status: DISCONTINUED | OUTPATIENT
Start: 2024-01-01 | End: 2024-01-01 | Stop reason: HOSPADM

## 2024-01-01 RX ORDER — MIRTAZAPINE 15 MG/1
15 TABLET, FILM COATED ORAL AT BEDTIME
Status: DISCONTINUED | OUTPATIENT
Start: 2024-01-01 | End: 2024-01-01 | Stop reason: HOSPADM

## 2024-01-01 RX ORDER — ASPIRIN 325 MG
325 TABLET ORAL ONCE
Status: CANCELLED | OUTPATIENT
Start: 2024-01-01 | End: 2024-01-01

## 2024-01-01 RX ORDER — CEPHALEXIN 500 MG/1
500 CAPSULE ORAL 4 TIMES DAILY
Qty: 28 CAPSULE | Refills: 0 | Status: SHIPPED | OUTPATIENT
Start: 2024-01-01 | End: 2024-01-01

## 2024-01-01 RX ORDER — DEXTROSE MONOHYDRATE 25 G/50ML
25-50 INJECTION, SOLUTION INTRAVENOUS
Status: DISCONTINUED | OUTPATIENT
Start: 2024-01-01 | End: 2024-01-01 | Stop reason: HOSPADM

## 2024-01-01 RX ORDER — ACETAMINOPHEN 325 MG/1
650 TABLET ORAL EVERY 4 HOURS PRN
Status: DISCONTINUED | OUTPATIENT
Start: 2024-01-01 | End: 2024-01-01

## 2024-01-01 RX ORDER — AMLODIPINE BESYLATE 5 MG/1
5 TABLET ORAL DAILY
Status: ON HOLD | COMMUNITY
End: 2024-01-01

## 2024-01-01 RX ORDER — DEXAMETHASONE SODIUM PHOSPHATE 4 MG/ML
INJECTION, SOLUTION INTRA-ARTICULAR; INTRALESIONAL; INTRAMUSCULAR; INTRAVENOUS; SOFT TISSUE PRN
Status: DISCONTINUED | OUTPATIENT
Start: 2024-01-01 | End: 2024-01-01

## 2024-01-01 RX ORDER — AMLODIPINE BESYLATE 10 MG/1
10 TABLET ORAL DAILY
Status: DISCONTINUED | OUTPATIENT
Start: 2024-01-01 | End: 2024-01-01 | Stop reason: HOSPADM

## 2024-01-01 RX ORDER — HYDROMORPHONE HCL IN WATER/PF 6 MG/30 ML
0.2 PATIENT CONTROLLED ANALGESIA SYRINGE INTRAVENOUS
Status: DISCONTINUED | OUTPATIENT
Start: 2024-01-01 | End: 2024-01-01 | Stop reason: HOSPADM

## 2024-01-01 RX ORDER — ISOSORBIDE MONONITRATE 30 MG/1
60 TABLET, EXTENDED RELEASE ORAL EVERY EVENING
Status: DISCONTINUED | OUTPATIENT
Start: 2024-01-01 | End: 2024-01-01 | Stop reason: HOSPADM

## 2024-01-01 RX ORDER — NALOXONE HYDROCHLORIDE 0.4 MG/ML
0.4 INJECTION, SOLUTION INTRAMUSCULAR; INTRAVENOUS; SUBCUTANEOUS
Status: DISCONTINUED | OUTPATIENT
Start: 2024-01-01 | End: 2024-01-01 | Stop reason: HOSPADM

## 2024-01-01 RX ORDER — FUROSEMIDE 10 MG/ML
40 INJECTION INTRAMUSCULAR; INTRAVENOUS
Status: DISCONTINUED | OUTPATIENT
Start: 2024-01-01 | End: 2024-01-01

## 2024-01-01 RX ORDER — RAMELTEON 8 MG/1
8 TABLET ORAL
Status: ON HOLD | COMMUNITY
Start: 2024-01-01 | End: 2024-01-01

## 2024-01-01 RX ORDER — ONDANSETRON 4 MG/1
4 TABLET, ORALLY DISINTEGRATING ORAL EVERY 6 HOURS PRN
Status: DISCONTINUED | OUTPATIENT
Start: 2024-01-01 | End: 2024-01-01

## 2024-01-01 RX ORDER — QUETIAPINE FUMARATE 25 MG/1
25 TABLET, FILM COATED ORAL 2 TIMES DAILY PRN
Status: DISCONTINUED | OUTPATIENT
Start: 2024-01-01 | End: 2024-01-01

## 2024-01-01 RX ORDER — FENTANYL CITRATE 0.05 MG/ML
25 INJECTION, SOLUTION INTRAMUSCULAR; INTRAVENOUS EVERY 5 MIN PRN
Status: DISCONTINUED | OUTPATIENT
Start: 2024-01-01 | End: 2024-01-01 | Stop reason: HOSPADM

## 2024-01-01 RX ORDER — IRBESARTAN 150 MG/1
300 TABLET ORAL AT BEDTIME
Status: DISCONTINUED | OUTPATIENT
Start: 2024-01-01 | End: 2024-01-01 | Stop reason: HOSPADM

## 2024-01-01 RX ORDER — AMLODIPINE BESYLATE 10 MG/1
10 TABLET ORAL DAILY
DISCHARGE
Start: 2024-01-01

## 2024-01-01 RX ORDER — SODIUM CHLORIDE, SODIUM LACTATE, POTASSIUM CHLORIDE, CALCIUM CHLORIDE 600; 310; 30; 20 MG/100ML; MG/100ML; MG/100ML; MG/100ML
INJECTION, SOLUTION INTRAVENOUS CONTINUOUS
Status: DISCONTINUED | OUTPATIENT
Start: 2024-01-01 | End: 2024-01-01 | Stop reason: HOSPADM

## 2024-01-01 RX ORDER — NICOTINE POLACRILEX 4 MG
15-30 LOZENGE BUCCAL
Status: DISCONTINUED | OUTPATIENT
Start: 2024-01-01 | End: 2024-01-01

## 2024-01-01 RX ORDER — METHYLPREDNISOLONE SODIUM SUCCINATE 125 MG/2ML
125 INJECTION, POWDER, LYOPHILIZED, FOR SOLUTION INTRAMUSCULAR; INTRAVENOUS
Status: DISCONTINUED | OUTPATIENT
Start: 2024-01-01 | End: 2024-01-01

## 2024-01-01 RX ORDER — FENTANYL CITRATE 50 UG/ML
INJECTION, SOLUTION INTRAMUSCULAR; INTRAVENOUS PRN
Status: DISCONTINUED | OUTPATIENT
Start: 2024-01-01 | End: 2024-01-01

## 2024-01-01 RX ORDER — POLYETHYLENE GLYCOL 3350 17 G/17G
17 POWDER, FOR SOLUTION ORAL DAILY
Status: DISCONTINUED | OUTPATIENT
Start: 2024-01-01 | End: 2024-01-01

## 2024-01-01 RX ORDER — INSULIN LISPRO 100 [IU]/ML
4 INJECTION, SOLUTION INTRAVENOUS; SUBCUTANEOUS
Status: DISCONTINUED | OUTPATIENT
Start: 2024-01-01 | End: 2024-01-01

## 2024-01-01 RX ORDER — ACETAMINOPHEN 325 MG/1
975 TABLET ORAL EVERY 8 HOURS
Qty: 27 TABLET | Refills: 0 | Status: COMPLETED | OUTPATIENT
Start: 2024-01-01 | End: 2024-01-01

## 2024-01-01 RX ORDER — ACETAMINOPHEN 650 MG/1
650 SUPPOSITORY RECTAL EVERY 4 HOURS PRN
Status: DISCONTINUED | OUTPATIENT
Start: 2024-01-01 | End: 2024-01-01 | Stop reason: HOSPADM

## 2024-01-01 RX ORDER — TORSEMIDE 5 MG/1
5 TABLET ORAL DAILY
Status: DISCONTINUED | OUTPATIENT
Start: 2024-01-01 | End: 2024-01-01 | Stop reason: HOSPADM

## 2024-01-01 RX ORDER — HYDROMORPHONE HCL IN WATER/PF 6 MG/30 ML
0.1 PATIENT CONTROLLED ANALGESIA SYRINGE INTRAVENOUS
Status: DISCONTINUED | OUTPATIENT
Start: 2024-01-01 | End: 2024-01-01 | Stop reason: HOSPADM

## 2024-01-01 RX ORDER — CEFTRIAXONE 1 G/1
1 INJECTION, POWDER, FOR SOLUTION INTRAMUSCULAR; INTRAVENOUS ONCE
Status: COMPLETED | OUTPATIENT
Start: 2024-01-01 | End: 2024-01-01

## 2024-01-01 RX ORDER — LORAZEPAM 0.5 MG/1
0.5 TABLET ORAL
Status: CANCELLED | OUTPATIENT
Start: 2024-01-01

## 2024-01-01 RX ORDER — SODIUM CHLORIDE, SODIUM LACTATE, POTASSIUM CHLORIDE, CALCIUM CHLORIDE 600; 310; 30; 20 MG/100ML; MG/100ML; MG/100ML; MG/100ML
INJECTION, SOLUTION INTRAVENOUS
Status: COMPLETED
Start: 2024-01-01 | End: 2024-01-01

## 2024-01-01 RX ORDER — MIRTAZAPINE 7.5 MG/1
15 TABLET, FILM COATED ORAL AT BEDTIME
Status: DISCONTINUED | OUTPATIENT
Start: 2024-01-01 | End: 2024-01-01

## 2024-01-01 RX ORDER — ONDANSETRON 2 MG/ML
4 INJECTION INTRAMUSCULAR; INTRAVENOUS EVERY 6 HOURS PRN
Status: DISCONTINUED | OUTPATIENT
Start: 2024-01-01 | End: 2024-01-01 | Stop reason: HOSPADM

## 2024-01-01 RX ORDER — RAMELTEON 8 MG/1
8 TABLET ORAL
Status: DISCONTINUED | OUTPATIENT
Start: 2024-01-01 | End: 2024-01-01 | Stop reason: HOSPADM

## 2024-01-01 RX ORDER — ASPIRIN 81 MG/1
243 TABLET, CHEWABLE ORAL ONCE
Status: CANCELLED | OUTPATIENT
Start: 2024-01-01

## 2024-01-01 RX ORDER — ONDANSETRON 4 MG/1
4 TABLET, ORALLY DISINTEGRATING ORAL EVERY 6 HOURS PRN
Status: DISCONTINUED | OUTPATIENT
Start: 2024-01-01 | End: 2024-01-01 | Stop reason: HOSPADM

## 2024-01-01 RX ORDER — BISACODYL 10 MG
10 SUPPOSITORY, RECTAL RECTAL DAILY PRN
Status: DISCONTINUED | OUTPATIENT
Start: 2024-01-01 | End: 2024-01-01 | Stop reason: HOSPADM

## 2024-01-01 RX ORDER — IRBESARTAN 300 MG/1
300 TABLET ORAL AT BEDTIME
Qty: 30 TABLET | Refills: 0 | Status: ON HOLD | OUTPATIENT
Start: 2024-01-01 | End: 2024-01-01

## 2024-01-01 RX ORDER — OXYCODONE HYDROCHLORIDE 5 MG/1
5 TABLET ORAL EVERY 4 HOURS PRN
Status: DISCONTINUED | OUTPATIENT
Start: 2024-01-01 | End: 2024-01-01

## 2024-01-01 RX ORDER — ACETAMINOPHEN 325 MG/1
650 TABLET ORAL 3 TIMES DAILY
Status: DISCONTINUED | OUTPATIENT
Start: 2024-01-01 | End: 2024-01-01 | Stop reason: HOSPADM

## 2024-01-01 RX ORDER — CEFTRIAXONE 1 G/1
1 INJECTION, POWDER, FOR SOLUTION INTRAMUSCULAR; INTRAVENOUS EVERY 24 HOURS
Status: COMPLETED | OUTPATIENT
Start: 2024-01-01 | End: 2024-01-01

## 2024-01-01 RX ORDER — TAMSULOSIN HYDROCHLORIDE 0.4 MG/1
0.4 CAPSULE ORAL AT BEDTIME
Status: DISCONTINUED | OUTPATIENT
Start: 2024-01-01 | End: 2024-01-01 | Stop reason: HOSPADM

## 2024-01-01 RX ORDER — FUROSEMIDE 20 MG
20 TABLET ORAL DAILY
Qty: 90 TABLET | Refills: 3 | Status: ON HOLD | OUTPATIENT
Start: 2024-01-01 | End: 2024-01-01

## 2024-01-01 RX ORDER — ISOSORBIDE MONONITRATE 60 MG/1
60 TABLET, EXTENDED RELEASE ORAL EVERY EVENING
Qty: 30 TABLET | Refills: 0 | Status: SHIPPED | OUTPATIENT
Start: 2024-01-01

## 2024-01-01 RX ORDER — PROCHLORPERAZINE MALEATE 5 MG
5 TABLET ORAL EVERY 6 HOURS PRN
Status: DISCONTINUED | OUTPATIENT
Start: 2024-01-01 | End: 2024-01-01

## 2024-01-01 RX ORDER — TRANEXAMIC ACID 650 MG/1
1950 TABLET ORAL ONCE
Status: CANCELLED | OUTPATIENT
Start: 2024-01-01 | End: 2024-01-01

## 2024-01-01 RX ORDER — TRANEXAMIC ACID 650 MG/1
1950 TABLET ORAL ONCE
Status: DISCONTINUED | OUTPATIENT
Start: 2024-01-01 | End: 2024-01-01 | Stop reason: HOSPADM

## 2024-01-01 RX ORDER — ACETAMINOPHEN 500 MG
1000 TABLET ORAL 3 TIMES DAILY
Status: ON HOLD | COMMUNITY
End: 2024-01-01

## 2024-01-01 RX ORDER — ASPIRIN 81 MG/1
81 TABLET ORAL 2 TIMES DAILY
Status: DISCONTINUED | OUTPATIENT
Start: 2024-01-01 | End: 2024-01-01

## 2024-01-01 RX ORDER — NALOXONE HYDROCHLORIDE 0.4 MG/ML
0.1 INJECTION, SOLUTION INTRAMUSCULAR; INTRAVENOUS; SUBCUTANEOUS
Status: DISCONTINUED | OUTPATIENT
Start: 2024-01-01 | End: 2024-01-01 | Stop reason: HOSPADM

## 2024-01-01 RX ORDER — ASPIRIN 81 MG/1
81 TABLET ORAL DAILY
Status: DISCONTINUED | OUTPATIENT
Start: 2024-01-01 | End: 2024-01-01

## 2024-01-01 RX ORDER — FENTANYL CITRATE 0.05 MG/ML
50 INJECTION, SOLUTION INTRAMUSCULAR; INTRAVENOUS EVERY 5 MIN PRN
Status: DISCONTINUED | OUTPATIENT
Start: 2024-01-01 | End: 2024-01-01 | Stop reason: HOSPADM

## 2024-01-01 RX ORDER — MIRTAZAPINE 7.5 MG/1
7.5 TABLET, FILM COATED ORAL AT BEDTIME
Status: DISCONTINUED | OUTPATIENT
Start: 2024-01-01 | End: 2024-01-01

## 2024-01-01 RX ORDER — TRANEXAMIC ACID 650 MG/1
1950 TABLET ORAL ONCE
Status: COMPLETED | OUTPATIENT
Start: 2024-01-01 | End: 2024-01-01

## 2024-01-01 RX ORDER — AMLODIPINE BESYLATE 10 MG/1
10 TABLET ORAL DAILY
Qty: 30 TABLET | Refills: 0 | Status: SHIPPED | OUTPATIENT
Start: 2024-01-01 | End: 2024-01-01

## 2024-01-01 RX ORDER — CEFAZOLIN SODIUM 1 G/3ML
1 INJECTION, POWDER, FOR SOLUTION INTRAMUSCULAR; INTRAVENOUS EVERY 8 HOURS
Qty: 10 ML | Refills: 0 | Status: COMPLETED | OUTPATIENT
Start: 2024-01-01 | End: 2024-01-01

## 2024-01-01 RX ORDER — SALIVA STIMULANT COMB. NO.3
1 SPRAY, NON-AEROSOL (ML) MUCOUS MEMBRANE 4 TIMES DAILY
DISCHARGE
Start: 2024-01-01

## 2024-01-01 RX ORDER — CEFAZOLIN SODIUM/WATER 2 G/20 ML
2 SYRINGE (ML) INTRAVENOUS SEE ADMIN INSTRUCTIONS
Status: DISCONTINUED | OUTPATIENT
Start: 2024-01-01 | End: 2024-01-01 | Stop reason: HOSPADM

## 2024-01-01 RX ORDER — LIDOCAINE HYDROCHLORIDE 20 MG/ML
INJECTION, SOLUTION INFILTRATION; PERINEURAL PRN
Status: DISCONTINUED | OUTPATIENT
Start: 2024-01-01 | End: 2024-01-01

## 2024-01-01 RX ORDER — ISOSORBIDE MONONITRATE 60 MG/1
60 TABLET, EXTENDED RELEASE ORAL DAILY
Status: DISCONTINUED | OUTPATIENT
Start: 2024-01-01 | End: 2024-01-01 | Stop reason: HOSPADM

## 2024-01-01 RX ORDER — SODIUM CHLORIDE 9 MG/ML
INJECTION, SOLUTION INTRAVENOUS
Status: COMPLETED
Start: 2024-01-01 | End: 2024-01-01

## 2024-01-01 RX ORDER — HYDROMORPHONE HCL IN WATER/PF 6 MG/30 ML
0.4 PATIENT CONTROLLED ANALGESIA SYRINGE INTRAVENOUS EVERY 5 MIN PRN
Status: DISCONTINUED | OUTPATIENT
Start: 2024-01-01 | End: 2024-01-01 | Stop reason: HOSPADM

## 2024-01-01 RX ORDER — SODIUM CHLORIDE, SODIUM LACTATE, POTASSIUM CHLORIDE, CALCIUM CHLORIDE 600; 310; 30; 20 MG/100ML; MG/100ML; MG/100ML; MG/100ML
INJECTION, SOLUTION INTRAVENOUS CONTINUOUS
Status: ACTIVE | OUTPATIENT
Start: 2024-01-01 | End: 2024-01-01

## 2024-01-01 RX ORDER — CEFAZOLIN SODIUM/WATER 2 G/20 ML
2 SYRINGE (ML) INTRAVENOUS
Status: CANCELLED | OUTPATIENT
Start: 2024-01-01

## 2024-01-01 RX ORDER — PROPOFOL 10 MG/ML
INJECTION, EMULSION INTRAVENOUS CONTINUOUS PRN
Status: DISCONTINUED | OUTPATIENT
Start: 2024-01-01 | End: 2024-01-01

## 2024-01-01 RX ORDER — ENOXAPARIN SODIUM 100 MG/ML
40 INJECTION SUBCUTANEOUS EVERY 24 HOURS
Qty: 24 ML | Refills: 0 | Status: SHIPPED | OUTPATIENT
Start: 2024-01-01 | End: 2024-01-01

## 2024-01-01 RX ORDER — DEXTROSE MONOHYDRATE 100 MG/ML
INJECTION, SOLUTION INTRAVENOUS CONTINUOUS PRN
Status: DISCONTINUED | OUTPATIENT
Start: 2024-01-01 | End: 2024-01-01

## 2024-01-01 RX ORDER — QUETIAPINE FUMARATE 25 MG/1
25 TABLET, FILM COATED ORAL
Status: DISCONTINUED | OUTPATIENT
Start: 2024-01-01 | End: 2024-01-01 | Stop reason: HOSPADM

## 2024-01-01 RX ORDER — INSULIN LISPRO 100 [IU]/ML
5 INJECTION, SOLUTION INTRAVENOUS; SUBCUTANEOUS
COMMUNITY
End: 2024-01-01

## 2024-01-01 RX ORDER — ONDANSETRON 2 MG/ML
4 INJECTION INTRAMUSCULAR; INTRAVENOUS EVERY 6 HOURS PRN
Status: DISCONTINUED | OUTPATIENT
Start: 2024-01-01 | End: 2024-01-01

## 2024-01-01 RX ORDER — FAMOTIDINE 20 MG/1
20 TABLET, FILM COATED ORAL DAILY
Status: DISCONTINUED | OUTPATIENT
Start: 2024-01-01 | End: 2024-01-01 | Stop reason: HOSPADM

## 2024-01-01 RX ORDER — FUROSEMIDE 20 MG
20 TABLET ORAL DAILY
Status: ON HOLD | COMMUNITY
End: 2024-01-01

## 2024-01-01 RX ORDER — HYDROMORPHONE HYDROCHLORIDE 1 MG/ML
0.5 INJECTION, SOLUTION INTRAMUSCULAR; INTRAVENOUS; SUBCUTANEOUS
Status: DISCONTINUED | OUTPATIENT
Start: 2024-01-01 | End: 2024-01-01 | Stop reason: HOSPADM

## 2024-01-01 RX ORDER — LIDOCAINE 4 G/G
1 PATCH TOPICAL DAILY PRN
Status: ON HOLD | COMMUNITY
Start: 2024-01-01 | End: 2024-01-01

## 2024-01-01 RX ORDER — SODIUM CHLORIDE, SODIUM LACTATE, POTASSIUM CHLORIDE, CALCIUM CHLORIDE 600; 310; 30; 20 MG/100ML; MG/100ML; MG/100ML; MG/100ML
INJECTION, SOLUTION INTRAVENOUS
Status: DISPENSED
Start: 2024-01-01 | End: 2024-01-01

## 2024-01-01 RX ORDER — HYDRALAZINE HYDROCHLORIDE 10 MG/1
10 TABLET, FILM COATED ORAL EVERY 4 HOURS PRN
Status: DISCONTINUED | OUTPATIENT
Start: 2024-01-01 | End: 2024-01-01 | Stop reason: HOSPADM

## 2024-01-01 RX ORDER — FUROSEMIDE 20 MG
20 TABLET ORAL DAILY
Status: DISCONTINUED | OUTPATIENT
Start: 2024-01-01 | End: 2024-01-01

## 2024-01-01 RX ORDER — MEPERIDINE HYDROCHLORIDE 25 MG/ML
12.5 INJECTION INTRAMUSCULAR; INTRAVENOUS; SUBCUTANEOUS EVERY 5 MIN PRN
Status: DISCONTINUED | OUTPATIENT
Start: 2024-01-01 | End: 2024-01-01 | Stop reason: HOSPADM

## 2024-01-01 RX ORDER — RAMELTEON 8 MG/1
8 TABLET ORAL AT BEDTIME
Status: DISCONTINUED | OUTPATIENT
Start: 2024-01-01 | End: 2024-01-01 | Stop reason: HOSPADM

## 2024-01-01 RX ORDER — HYDROCODONE BITARTRATE AND ACETAMINOPHEN 5; 325 MG/1; MG/1
1 TABLET ORAL EVERY 4 HOURS PRN
Status: ON HOLD | COMMUNITY
End: 2024-01-01

## 2024-01-01 RX ORDER — ASPIRIN 81 MG/1
81 TABLET ORAL 2 TIMES DAILY
Status: ON HOLD | COMMUNITY
End: 2024-01-01

## 2024-01-01 RX ORDER — SODIUM CHLORIDE 9 MG/ML
INJECTION, SOLUTION INTRAVENOUS CONTINUOUS
Status: ACTIVE | OUTPATIENT
Start: 2024-01-01 | End: 2024-01-01

## 2024-01-01 RX ORDER — PROPOFOL 10 MG/ML
INJECTION, EMULSION INTRAVENOUS PRN
Status: DISCONTINUED | OUTPATIENT
Start: 2024-01-01 | End: 2024-01-01

## 2024-01-01 RX ORDER — CEFAZOLIN SODIUM 2 G/100ML
2 INJECTION, SOLUTION INTRAVENOUS
Status: CANCELLED | OUTPATIENT
Start: 2024-01-01

## 2024-01-01 RX ORDER — FUROSEMIDE 20 MG
20 TABLET ORAL DAILY
Status: DISCONTINUED | OUTPATIENT
Start: 2024-01-01 | End: 2024-01-01 | Stop reason: HOSPADM

## 2024-01-01 RX ORDER — CARVEDILOL 6.25 MG/1
6.25 TABLET ORAL 2 TIMES DAILY WITH MEALS
Status: DISCONTINUED | OUTPATIENT
Start: 2024-01-01 | End: 2024-01-01 | Stop reason: HOSPADM

## 2024-01-01 RX ORDER — LIDOCAINE 4 G/G
1 PATCH TOPICAL EVERY 24 HOURS
Qty: 30 PATCH | Refills: 0 | Status: ON HOLD | OUTPATIENT
Start: 2024-01-01 | End: 2024-01-01

## 2024-01-01 RX ORDER — LORATADINE 10 MG/1
10 TABLET ORAL DAILY
Status: DISCONTINUED | OUTPATIENT
Start: 2024-01-01 | End: 2024-01-01

## 2024-01-01 RX ORDER — ACETAMINOPHEN 500 MG
1000 TABLET ORAL ONCE
Status: COMPLETED | OUTPATIENT
Start: 2024-01-01 | End: 2024-01-01

## 2024-01-01 RX ORDER — CEFAZOLIN SODIUM 2 G/100ML
2 INJECTION, SOLUTION INTRAVENOUS SEE ADMIN INSTRUCTIONS
Status: CANCELLED | OUTPATIENT
Start: 2024-01-01

## 2024-01-01 RX ORDER — AMLODIPINE BESYLATE 5 MG/1
10 TABLET ORAL DAILY
Status: DISCONTINUED | OUTPATIENT
Start: 2024-01-01 | End: 2024-01-01 | Stop reason: HOSPADM

## 2024-01-01 RX ORDER — VANCOMYCIN HYDROCHLORIDE 1 G/20ML
INJECTION, POWDER, LYOPHILIZED, FOR SOLUTION INTRAVENOUS PRN
Status: DISCONTINUED | OUTPATIENT
Start: 2024-01-01 | End: 2024-01-01 | Stop reason: HOSPADM

## 2024-01-01 RX ORDER — OXYCODONE HYDROCHLORIDE 5 MG/1
5 TABLET ORAL EVERY 4 HOURS PRN
Status: DISCONTINUED | OUTPATIENT
Start: 2024-01-01 | End: 2024-01-01 | Stop reason: HOSPADM

## 2024-01-01 RX ORDER — ENOXAPARIN SODIUM 100 MG/ML
40 INJECTION SUBCUTANEOUS EVERY 24 HOURS
Status: DISCONTINUED | OUTPATIENT
Start: 2024-01-01 | End: 2024-01-01 | Stop reason: HOSPADM

## 2024-01-01 RX ORDER — INSULIN LISPRO 100 [IU]/ML
4 INJECTION, SOLUTION INTRAVENOUS; SUBCUTANEOUS
COMMUNITY
End: 2024-01-01

## 2024-01-01 RX ORDER — CEFAZOLIN SODIUM/WATER 2 G/20 ML
2 SYRINGE (ML) INTRAVENOUS
Status: DISCONTINUED | OUTPATIENT
Start: 2024-01-01 | End: 2024-01-01 | Stop reason: HOSPADM

## 2024-01-01 RX ORDER — FUROSEMIDE 10 MG/ML
20 INJECTION INTRAMUSCULAR; INTRAVENOUS ONCE
Status: COMPLETED | OUTPATIENT
Start: 2024-01-01 | End: 2024-01-01

## 2024-01-01 RX ORDER — LIDOCAINE 4 G/G
1 PATCH TOPICAL
Status: DISCONTINUED | OUTPATIENT
Start: 2024-01-01 | End: 2024-01-01

## 2024-01-01 RX ORDER — LIDOCAINE 40 MG/G
CREAM TOPICAL
Status: CANCELLED | OUTPATIENT
Start: 2024-01-01

## 2024-01-01 RX ORDER — CEFAZOLIN SODIUM/WATER 2 G/20 ML
2 SYRINGE (ML) INTRAVENOUS SEE ADMIN INSTRUCTIONS
Status: CANCELLED | OUTPATIENT
Start: 2024-01-01

## 2024-01-01 RX ORDER — POLYETHYLENE GLYCOL 3350 17 G/17G
17 POWDER, FOR SOLUTION ORAL 2 TIMES DAILY PRN
Status: DISCONTINUED | OUTPATIENT
Start: 2024-01-01 | End: 2024-01-01 | Stop reason: HOSPADM

## 2024-01-01 RX ORDER — POTASSIUM CHLORIDE 750 MG/1
20 TABLET, EXTENDED RELEASE ORAL
Status: CANCELLED | OUTPATIENT
Start: 2024-01-01

## 2024-01-01 RX ORDER — TAMSULOSIN HYDROCHLORIDE 0.4 MG/1
0.4 CAPSULE ORAL EVERY MORNING
Qty: 30 CAPSULE | Refills: 0 | Status: SHIPPED | OUTPATIENT
Start: 2024-01-01

## 2024-01-01 RX ORDER — AMOXICILLIN 250 MG
1 CAPSULE ORAL 2 TIMES DAILY
Status: DISCONTINUED | OUTPATIENT
Start: 2024-01-01 | End: 2024-01-01 | Stop reason: HOSPADM

## 2024-01-01 RX ORDER — ACETAMINOPHEN 325 MG/1
975 TABLET ORAL 3 TIMES DAILY
Status: DISCONTINUED | OUTPATIENT
Start: 2024-01-01 | End: 2024-01-01 | Stop reason: HOSPADM

## 2024-01-01 RX ORDER — ACETAMINOPHEN 500 MG
1000 TABLET ORAL 3 TIMES DAILY
Status: DISCONTINUED | OUTPATIENT
Start: 2024-01-01 | End: 2024-01-01

## 2024-01-01 RX ORDER — SODIUM CHLORIDE, SODIUM LACTATE, POTASSIUM CHLORIDE, CALCIUM CHLORIDE 600; 310; 30; 20 MG/100ML; MG/100ML; MG/100ML; MG/100ML
INJECTION, SOLUTION INTRAVENOUS CONTINUOUS
Status: DISCONTINUED | OUTPATIENT
Start: 2024-01-01 | End: 2024-01-01

## 2024-01-01 RX ORDER — MIRTAZAPINE 15 MG/1
15 TABLET, FILM COATED ORAL AT BEDTIME
Qty: 30 TABLET | Refills: 0 | Status: SHIPPED | OUTPATIENT
Start: 2024-01-01

## 2024-01-01 RX ORDER — LORAZEPAM 2 MG/ML
0.5 INJECTION INTRAMUSCULAR
Status: CANCELLED | OUTPATIENT
Start: 2024-01-01

## 2024-01-01 RX ORDER — OXYCODONE HYDROCHLORIDE 5 MG/1
2.5-5 TABLET ORAL EVERY 4 HOURS PRN
Qty: 20 TABLET | Refills: 0 | Status: SHIPPED | OUTPATIENT
Start: 2024-01-01

## 2024-01-01 RX ORDER — HYDRALAZINE HYDROCHLORIDE 20 MG/ML
10 INJECTION INTRAMUSCULAR; INTRAVENOUS EVERY 4 HOURS PRN
Status: DISCONTINUED | OUTPATIENT
Start: 2024-01-01 | End: 2024-01-01 | Stop reason: HOSPADM

## 2024-01-01 RX ORDER — MIRTAZAPINE 7.5 MG/1
15 TABLET, FILM COATED ORAL AT BEDTIME
Status: DISCONTINUED | OUTPATIENT
Start: 2024-01-01 | End: 2024-01-01 | Stop reason: HOSPADM

## 2024-01-01 RX ORDER — INSULIN LISPRO 100 [IU]/ML
3 INJECTION, SOLUTION INTRAVENOUS; SUBCUTANEOUS
Status: DISCONTINUED | OUTPATIENT
Start: 2024-01-01 | End: 2024-01-01

## 2024-01-01 RX ORDER — ONDANSETRON 2 MG/ML
INJECTION INTRAMUSCULAR; INTRAVENOUS PRN
Status: DISCONTINUED | OUTPATIENT
Start: 2024-01-01 | End: 2024-01-01

## 2024-01-01 RX ADMIN — IRBESARTAN 300 MG: 150 TABLET ORAL at 19:59

## 2024-01-01 RX ADMIN — SODIUM CHLORIDE 1000 ML: 9 INJECTION, SOLUTION INTRAVENOUS at 21:06

## 2024-01-01 RX ADMIN — SODIUM CHLORIDE, POTASSIUM CHLORIDE, SODIUM LACTATE AND CALCIUM CHLORIDE 500 ML: 600; 310; 30; 20 INJECTION, SOLUTION INTRAVENOUS at 17:34

## 2024-01-01 RX ADMIN — ISOSORBIDE MONONITRATE 60 MG: 60 TABLET, EXTENDED RELEASE ORAL at 21:33

## 2024-01-01 RX ADMIN — CARVEDILOL 6.25 MG: 6.25 TABLET, FILM COATED ORAL at 18:02

## 2024-01-01 RX ADMIN — PHENYLEPHRINE HYDROCHLORIDE 200 MCG: 10 INJECTION INTRAVENOUS at 07:54

## 2024-01-01 RX ADMIN — TORSEMIDE 5 MG: 5 TABLET ORAL at 08:45

## 2024-01-01 RX ADMIN — AMLODIPINE BESYLATE 10 MG: 10 TABLET ORAL at 08:53

## 2024-01-01 RX ADMIN — INSULIN ASPART 2 UNITS: 100 INJECTION, SOLUTION INTRAVENOUS; SUBCUTANEOUS at 08:39

## 2024-01-01 RX ADMIN — TAMSULOSIN HYDROCHLORIDE 0.4 MG: 0.4 CAPSULE ORAL at 21:05

## 2024-01-01 RX ADMIN — PHENYLEPHRINE HYDROCHLORIDE 100 MCG: 10 INJECTION INTRAVENOUS at 09:43

## 2024-01-01 RX ADMIN — CARVEDILOL 12.5 MG: 12.5 TABLET, FILM COATED ORAL at 08:37

## 2024-01-01 RX ADMIN — CARVEDILOL 6.25 MG: 6.25 TABLET, FILM COATED ORAL at 18:51

## 2024-01-01 RX ADMIN — AMLODIPINE BESYLATE 5 MG: 5 TABLET ORAL at 08:16

## 2024-01-01 RX ADMIN — CARVEDILOL 12.5 MG: 12.5 TABLET, FILM COATED ORAL at 17:35

## 2024-01-01 RX ADMIN — CARVEDILOL 12.5 MG: 12.5 TABLET, FILM COATED ORAL at 08:03

## 2024-01-01 RX ADMIN — ACETAMINOPHEN 975 MG: 325 TABLET, FILM COATED ORAL at 16:13

## 2024-01-01 RX ADMIN — DEXTROSE 15 G: 15 GEL ORAL at 02:12

## 2024-01-01 RX ADMIN — CARVEDILOL 12.5 MG: 12.5 TABLET, FILM COATED ORAL at 17:58

## 2024-01-01 RX ADMIN — OXYCODONE HYDROCHLORIDE 2.5 MG: 5 TABLET ORAL at 20:07

## 2024-01-01 RX ADMIN — AMLODIPINE BESYLATE 10 MG: 10 TABLET ORAL at 08:42

## 2024-01-01 RX ADMIN — Medication 10 MG: at 19:57

## 2024-01-01 RX ADMIN — INSULIN ASPART 4 UNITS: 100 INJECTION, SOLUTION INTRAVENOUS; SUBCUTANEOUS at 08:22

## 2024-01-01 RX ADMIN — SODIUM CHLORIDE 500 ML: 9 INJECTION, SOLUTION INTRAVENOUS at 12:53

## 2024-01-01 RX ADMIN — Medication 1 SPRAY: at 21:20

## 2024-01-01 RX ADMIN — ENOXAPARIN SODIUM 40 MG: 40 INJECTION SUBCUTANEOUS at 13:16

## 2024-01-01 RX ADMIN — IRON SUCROSE 200 MG: 20 INJECTION, SOLUTION INTRAVENOUS at 12:00

## 2024-01-01 RX ADMIN — MIRTAZAPINE 15 MG: 15 TABLET, FILM COATED ORAL at 21:09

## 2024-01-01 RX ADMIN — Medication 1 SPRAY: at 21:09

## 2024-01-01 RX ADMIN — TAMSULOSIN HYDROCHLORIDE 0.4 MG: 0.4 CAPSULE ORAL at 08:09

## 2024-01-01 RX ADMIN — AMLODIPINE BESYLATE 10 MG: 10 TABLET ORAL at 08:18

## 2024-01-01 RX ADMIN — ACETAMINOPHEN 650 MG: 325 TABLET, FILM COATED ORAL at 20:27

## 2024-01-01 RX ADMIN — INSULIN ASPART 6 UNITS: 100 INJECTION, SOLUTION INTRAVENOUS; SUBCUTANEOUS at 12:14

## 2024-01-01 RX ADMIN — IRBESARTAN 300 MG: 150 TABLET ORAL at 20:08

## 2024-01-01 RX ADMIN — RAMELTEON 8 MG: 8 TABLET, FILM COATED ORAL at 20:12

## 2024-01-01 RX ADMIN — TAMSULOSIN HYDROCHLORIDE 0.4 MG: 0.4 CAPSULE ORAL at 21:13

## 2024-01-01 RX ADMIN — CARVEDILOL 12.5 MG: 12.5 TABLET, FILM COATED ORAL at 09:33

## 2024-01-01 RX ADMIN — DEXTROSE MONOHYDRATE 25 ML: 25 INJECTION, SOLUTION INTRAVENOUS at 06:57

## 2024-01-01 RX ADMIN — INSULIN ASPART 4 UNITS: 100 INJECTION, SOLUTION INTRAVENOUS; SUBCUTANEOUS at 18:24

## 2024-01-01 RX ADMIN — INSULIN LISPRO 4 UNITS: 100 INJECTION, SOLUTION INTRAVENOUS; SUBCUTANEOUS at 13:05

## 2024-01-01 RX ADMIN — MIRTAZAPINE 15 MG: 15 TABLET, FILM COATED ORAL at 21:13

## 2024-01-01 RX ADMIN — ASPIRIN 81 MG: 81 TABLET, COATED ORAL at 08:34

## 2024-01-01 RX ADMIN — Medication 1 SPRAY: at 16:30

## 2024-01-01 RX ADMIN — PHENYLEPHRINE HYDROCHLORIDE 0.5 MCG/KG/MIN: 10 INJECTION INTRAVENOUS at 08:30

## 2024-01-01 RX ADMIN — PROPOFOL 90 MG: 10 INJECTION, EMULSION INTRAVENOUS at 07:54

## 2024-01-01 RX ADMIN — ACETAMINOPHEN 650 MG: 325 TABLET, FILM COATED ORAL at 09:25

## 2024-01-01 RX ADMIN — IRBESARTAN 300 MG: 150 TABLET ORAL at 20:50

## 2024-01-01 RX ADMIN — ENOXAPARIN SODIUM 40 MG: 40 INJECTION SUBCUTANEOUS at 17:23

## 2024-01-01 RX ADMIN — FAMOTIDINE 20 MG: 20 TABLET, FILM COATED ORAL at 09:23

## 2024-01-01 RX ADMIN — ACETAMINOPHEN 650 MG: 325 TABLET, FILM COATED ORAL at 14:04

## 2024-01-01 RX ADMIN — TAMSULOSIN HYDROCHLORIDE 0.4 MG: 0.4 CAPSULE ORAL at 21:17

## 2024-01-01 RX ADMIN — IRBESARTAN 300 MG: 150 TABLET ORAL at 20:38

## 2024-01-01 RX ADMIN — INSULIN LISPRO 3 UNITS: 100 INJECTION, SOLUTION INTRAVENOUS; SUBCUTANEOUS at 08:51

## 2024-01-01 RX ADMIN — DEXMEDETOMIDINE HYDROCHLORIDE 12 MCG: 200 INJECTION INTRAVENOUS at 09:19

## 2024-01-01 RX ADMIN — CARVEDILOL 12.5 MG: 12.5 TABLET, FILM COATED ORAL at 08:00

## 2024-01-01 RX ADMIN — TAMSULOSIN HYDROCHLORIDE 0.4 MG: 0.4 CAPSULE ORAL at 22:17

## 2024-01-01 RX ADMIN — IRBESARTAN 300 MG: 150 TABLET ORAL at 21:18

## 2024-01-01 RX ADMIN — PHENYLEPHRINE HYDROCHLORIDE 100 MCG: 10 INJECTION INTRAVENOUS at 09:39

## 2024-01-01 RX ADMIN — FUROSEMIDE 20 MG: 10 INJECTION, SOLUTION INTRAMUSCULAR; INTRAVENOUS at 20:07

## 2024-01-01 RX ADMIN — IRBESARTAN 300 MG: 150 TABLET ORAL at 19:57

## 2024-01-01 RX ADMIN — OXYCODONE HYDROCHLORIDE 5 MG: 5 TABLET ORAL at 09:30

## 2024-01-01 RX ADMIN — OXYCODONE HYDROCHLORIDE 2.5 MG: 5 TABLET ORAL at 19:00

## 2024-01-01 RX ADMIN — CARVEDILOL 12.5 MG: 12.5 TABLET, FILM COATED ORAL at 08:24

## 2024-01-01 RX ADMIN — ISOSORBIDE MONONITRATE 60 MG: 30 TABLET, EXTENDED RELEASE ORAL at 09:10

## 2024-01-01 RX ADMIN — ENOXAPARIN SODIUM 40 MG: 40 INJECTION SUBCUTANEOUS at 12:41

## 2024-01-01 RX ADMIN — CARVEDILOL 6.25 MG: 6.25 TABLET, FILM COATED ORAL at 08:57

## 2024-01-01 RX ADMIN — TORSEMIDE 5 MG: 5 TABLET ORAL at 08:42

## 2024-01-01 RX ADMIN — TAMSULOSIN HYDROCHLORIDE 0.4 MG: 0.4 CAPSULE ORAL at 08:03

## 2024-01-01 RX ADMIN — ISOSORBIDE MONONITRATE 60 MG: 60 TABLET, EXTENDED RELEASE ORAL at 21:16

## 2024-01-01 RX ADMIN — SODIUM CHLORIDE, POTASSIUM CHLORIDE, SODIUM LACTATE AND CALCIUM CHLORIDE: 600; 310; 30; 20 INJECTION, SOLUTION INTRAVENOUS at 14:02

## 2024-01-01 RX ADMIN — APIXABAN 2.5 MG: 2.5 TABLET, FILM COATED ORAL at 09:00

## 2024-01-01 RX ADMIN — POLYETHYLENE GLYCOL 3350 17 G: 17 POWDER, FOR SOLUTION ORAL at 08:45

## 2024-01-01 RX ADMIN — FAMOTIDINE 20 MG: 20 TABLET, FILM COATED ORAL at 08:42

## 2024-01-01 RX ADMIN — ACETAMINOPHEN 650 MG: 325 TABLET, FILM COATED ORAL at 13:14

## 2024-01-01 RX ADMIN — OXYCODONE HYDROCHLORIDE 2.5 MG: 5 TABLET ORAL at 01:00

## 2024-01-01 RX ADMIN — ACETAMINOPHEN 1000 MG: 500 TABLET, FILM COATED ORAL at 07:48

## 2024-01-01 RX ADMIN — IRBESARTAN 300 MG: 150 TABLET ORAL at 20:53

## 2024-01-01 RX ADMIN — CARVEDILOL 12.5 MG: 12.5 TABLET, FILM COATED ORAL at 17:51

## 2024-01-01 RX ADMIN — INSULIN ASPART 1 UNITS: 100 INJECTION, SOLUTION INTRAVENOUS; SUBCUTANEOUS at 08:51

## 2024-01-01 RX ADMIN — ACETAMINOPHEN 975 MG: 325 TABLET, FILM COATED ORAL at 14:05

## 2024-01-01 RX ADMIN — Medication 1 SPRAY: at 12:10

## 2024-01-01 RX ADMIN — IRBESARTAN 300 MG: 150 TABLET ORAL at 20:33

## 2024-01-01 RX ADMIN — DEXAMETHASONE SODIUM PHOSPHATE 4 MG: 4 INJECTION, SOLUTION INTRA-ARTICULAR; INTRALESIONAL; INTRAMUSCULAR; INTRAVENOUS; SOFT TISSUE at 08:34

## 2024-01-01 RX ADMIN — INSULIN ASPART 2 UNITS: 100 INJECTION, SOLUTION INTRAVENOUS; SUBCUTANEOUS at 08:49

## 2024-01-01 RX ADMIN — SODIUM CHLORIDE, POTASSIUM CHLORIDE, SODIUM LACTATE AND CALCIUM CHLORIDE: 600; 310; 30; 20 INJECTION, SOLUTION INTRAVENOUS at 08:04

## 2024-01-01 RX ADMIN — CARVEDILOL 12.5 MG: 12.5 TABLET, FILM COATED ORAL at 08:16

## 2024-01-01 RX ADMIN — CARVEDILOL 12.5 MG: 12.5 TABLET, FILM COATED ORAL at 17:08

## 2024-01-01 RX ADMIN — AMLODIPINE BESYLATE 10 MG: 10 TABLET ORAL at 08:10

## 2024-01-01 RX ADMIN — AMLODIPINE BESYLATE 5 MG: 5 TABLET ORAL at 08:33

## 2024-01-01 RX ADMIN — OXYCODONE HYDROCHLORIDE 2.5 MG: 5 TABLET ORAL at 14:19

## 2024-01-01 RX ADMIN — RAMELTEON 8 MG: 8 TABLET, FILM COATED ORAL at 20:53

## 2024-01-01 RX ADMIN — ISOSORBIDE MONONITRATE 60 MG: 60 TABLET, EXTENDED RELEASE ORAL at 20:12

## 2024-01-01 RX ADMIN — ACETAMINOPHEN 1000 MG: 500 TABLET, FILM COATED ORAL at 08:58

## 2024-01-01 RX ADMIN — ENOXAPARIN SODIUM 40 MG: 40 INJECTION SUBCUTANEOUS at 13:25

## 2024-01-01 RX ADMIN — IRBESARTAN 300 MG: 150 TABLET ORAL at 21:05

## 2024-01-01 RX ADMIN — Medication 1 SPRAY: at 09:03

## 2024-01-01 RX ADMIN — CARVEDILOL 12.5 MG: 12.5 TABLET, FILM COATED ORAL at 17:25

## 2024-01-01 RX ADMIN — FENTANYL CITRATE 50 MCG: 50 INJECTION INTRAMUSCULAR; INTRAVENOUS at 09:16

## 2024-01-01 RX ADMIN — RAMELTEON 8 MG: 8 TABLET, FILM COATED ORAL at 22:15

## 2024-01-01 RX ADMIN — Medication 10 MG: at 20:01

## 2024-01-01 RX ADMIN — OXYCODONE HYDROCHLORIDE 2.5 MG: 5 TABLET ORAL at 08:46

## 2024-01-01 RX ADMIN — ACETAMINOPHEN 975 MG: 325 TABLET, FILM COATED ORAL at 08:45

## 2024-01-01 RX ADMIN — ACETAMINOPHEN 650 MG: 325 TABLET, FILM COATED ORAL at 23:38

## 2024-01-01 RX ADMIN — ACETAMINOPHEN 650 MG: 325 TABLET, FILM COATED ORAL at 20:49

## 2024-01-01 RX ADMIN — Medication 1 SPRAY: at 08:48

## 2024-01-01 RX ADMIN — CARVEDILOL 6.25 MG: 6.25 TABLET, FILM COATED ORAL at 17:50

## 2024-01-01 RX ADMIN — IRON SUCROSE 200 MG: 20 INJECTION, SOLUTION INTRAVENOUS at 12:08

## 2024-01-01 RX ADMIN — CARVEDILOL 12.5 MG: 12.5 TABLET, FILM COATED ORAL at 07:49

## 2024-01-01 RX ADMIN — ASPIRIN 81 MG: 81 TABLET, COATED ORAL at 16:17

## 2024-01-01 RX ADMIN — SENNOSIDES AND DOCUSATE SODIUM 1 TABLET: 50; 8.6 TABLET ORAL at 08:59

## 2024-01-01 RX ADMIN — TAMSULOSIN HYDROCHLORIDE 0.4 MG: 0.4 CAPSULE ORAL at 08:39

## 2024-01-01 RX ADMIN — APIXABAN 2.5 MG: 2.5 TABLET, FILM COATED ORAL at 21:08

## 2024-01-01 RX ADMIN — CARVEDILOL 6.25 MG: 6.25 TABLET, FILM COATED ORAL at 08:43

## 2024-01-01 RX ADMIN — LIDOCAINE 1 PATCH: 4 PATCH TOPICAL at 21:21

## 2024-01-01 RX ADMIN — TAMSULOSIN HYDROCHLORIDE 0.4 MG: 0.4 CAPSULE ORAL at 08:06

## 2024-01-01 RX ADMIN — TAMSULOSIN HYDROCHLORIDE 0.4 MG: 0.4 CAPSULE ORAL at 23:38

## 2024-01-01 RX ADMIN — Medication 1 SPRAY: at 17:45

## 2024-01-01 RX ADMIN — TAMSULOSIN HYDROCHLORIDE 0.4 MG: 0.4 CAPSULE ORAL at 09:41

## 2024-01-01 RX ADMIN — POLYETHYLENE GLYCOL 3350 17 G: 17 POWDER, FOR SOLUTION ORAL at 07:48

## 2024-01-01 RX ADMIN — CARVEDILOL 12.5 MG: 12.5 TABLET, FILM COATED ORAL at 09:25

## 2024-01-01 RX ADMIN — ISOSORBIDE MONONITRATE 60 MG: 60 TABLET, EXTENDED RELEASE ORAL at 21:18

## 2024-01-01 RX ADMIN — CARVEDILOL 12.5 MG: 12.5 TABLET, FILM COATED ORAL at 16:38

## 2024-01-01 RX ADMIN — SENNOSIDES AND DOCUSATE SODIUM 1 TABLET: 50; 8.6 TABLET ORAL at 08:56

## 2024-01-01 RX ADMIN — MIRTAZAPINE 7.5 MG: 7.5 TABLET, FILM COATED ORAL at 21:16

## 2024-01-01 RX ADMIN — INSULIN ASPART 5 UNITS: 100 INJECTION, SOLUTION INTRAVENOUS; SUBCUTANEOUS at 08:34

## 2024-01-01 RX ADMIN — Medication 1 SPRAY: at 12:55

## 2024-01-01 RX ADMIN — INSULIN ASPART 5 UNITS: 100 INJECTION, SOLUTION INTRAVENOUS; SUBCUTANEOUS at 08:21

## 2024-01-01 RX ADMIN — Medication 1 SPRAY: at 20:49

## 2024-01-01 RX ADMIN — Medication 1 SPRAY: at 12:13

## 2024-01-01 RX ADMIN — ACETAMINOPHEN 1000 MG: 500 TABLET, FILM COATED ORAL at 08:15

## 2024-01-01 RX ADMIN — ISOSORBIDE MONONITRATE 60 MG: 60 TABLET, EXTENDED RELEASE ORAL at 19:59

## 2024-01-01 RX ADMIN — AMLODIPINE BESYLATE 10 MG: 5 TABLET ORAL at 08:45

## 2024-01-01 RX ADMIN — ACETAMINOPHEN 975 MG: 325 TABLET, FILM COATED ORAL at 05:08

## 2024-01-01 RX ADMIN — CARVEDILOL 6.25 MG: 6.25 TABLET, FILM COATED ORAL at 17:32

## 2024-01-01 RX ADMIN — CEFTRIAXONE SODIUM 1 G: 1 INJECTION, POWDER, FOR SOLUTION INTRAMUSCULAR; INTRAVENOUS at 00:32

## 2024-01-01 RX ADMIN — TAMSULOSIN HYDROCHLORIDE 0.4 MG: 0.4 CAPSULE ORAL at 21:03

## 2024-01-01 RX ADMIN — ENOXAPARIN SODIUM 40 MG: 40 INJECTION SUBCUTANEOUS at 13:14

## 2024-01-01 RX ADMIN — ACETAMINOPHEN 650 MG: 325 TABLET, FILM COATED ORAL at 21:17

## 2024-01-01 RX ADMIN — ASPIRIN 81 MG: 81 TABLET, COATED ORAL at 08:15

## 2024-01-01 RX ADMIN — TAMSULOSIN HYDROCHLORIDE 0.4 MG: 0.4 CAPSULE ORAL at 07:57

## 2024-01-01 RX ADMIN — INSULIN ASPART 3 UNITS: 100 INJECTION, SOLUTION INTRAVENOUS; SUBCUTANEOUS at 12:47

## 2024-01-01 RX ADMIN — ISOSORBIDE MONONITRATE 60 MG: 30 TABLET, EXTENDED RELEASE ORAL at 07:58

## 2024-01-01 RX ADMIN — TRANEXAMIC ACID 1950 MG: 650 TABLET ORAL at 06:24

## 2024-01-01 RX ADMIN — SODIUM CHLORIDE, POTASSIUM CHLORIDE, SODIUM LACTATE AND CALCIUM CHLORIDE 1000 ML: 600; 310; 30; 20 INJECTION, SOLUTION INTRAVENOUS at 18:10

## 2024-01-01 RX ADMIN — Medication 1 SPRAY: at 08:34

## 2024-01-01 RX ADMIN — ROCURONIUM BROMIDE 50 MG: 50 INJECTION, SOLUTION INTRAVENOUS at 07:54

## 2024-01-01 RX ADMIN — ENOXAPARIN SODIUM 30 MG: 30 INJECTION SUBCUTANEOUS at 15:41

## 2024-01-01 RX ADMIN — OXYCODONE HYDROCHLORIDE 5 MG: 5 TABLET ORAL at 14:21

## 2024-01-01 RX ADMIN — INSULIN ASPART 1 UNITS: 100 INJECTION, SOLUTION INTRAVENOUS; SUBCUTANEOUS at 12:49

## 2024-01-01 RX ADMIN — ENOXAPARIN SODIUM 40 MG: 40 INJECTION SUBCUTANEOUS at 12:49

## 2024-01-01 RX ADMIN — POLYETHYLENE GLYCOL 3350 17 G: 17 POWDER, FOR SOLUTION ORAL at 09:22

## 2024-01-01 RX ADMIN — Medication 10 MG: at 20:08

## 2024-01-01 RX ADMIN — CARVEDILOL 6.25 MG: 6.25 TABLET, FILM COATED ORAL at 17:21

## 2024-01-01 RX ADMIN — ACETAMINOPHEN 1000 MG: 500 TABLET, FILM COATED ORAL at 15:06

## 2024-01-01 RX ADMIN — DEXTROSE MONOHYDRATE 25 ML: 25 INJECTION, SOLUTION INTRAVENOUS at 22:16

## 2024-01-01 RX ADMIN — ISOSORBIDE MONONITRATE 60 MG: 30 TABLET, EXTENDED RELEASE ORAL at 08:42

## 2024-01-01 RX ADMIN — ACETAMINOPHEN 650 MG: 325 TABLET, FILM COATED ORAL at 07:49

## 2024-01-01 RX ADMIN — MIRTAZAPINE 15 MG: 15 TABLET, FILM COATED ORAL at 21:22

## 2024-01-01 RX ADMIN — TORSEMIDE 5 MG: 5 TABLET ORAL at 09:22

## 2024-01-01 RX ADMIN — ISOSORBIDE MONONITRATE 60 MG: 60 TABLET, EXTENDED RELEASE ORAL at 20:53

## 2024-01-01 RX ADMIN — Medication 1 SPRAY: at 17:18

## 2024-01-01 RX ADMIN — ACETAMINOPHEN 1000 MG: 500 TABLET, FILM COATED ORAL at 08:33

## 2024-01-01 RX ADMIN — POLYETHYLENE GLYCOL 3350 17 G: 17 POWDER, FOR SOLUTION ORAL at 09:10

## 2024-01-01 RX ADMIN — CARVEDILOL 12.5 MG: 12.5 TABLET, FILM COATED ORAL at 09:15

## 2024-01-01 RX ADMIN — ASPIRIN 81 MG: 81 TABLET, COATED ORAL at 08:16

## 2024-01-01 RX ADMIN — ACETAMINOPHEN 650 MG: 325 TABLET, FILM COATED ORAL at 14:01

## 2024-01-01 RX ADMIN — CARVEDILOL 12.5 MG: 12.5 TABLET, FILM COATED ORAL at 16:13

## 2024-01-01 RX ADMIN — LIDOCAINE 1 PATCH: 4 PATCH TOPICAL at 20:11

## 2024-01-01 RX ADMIN — APIXABAN 2.5 MG: 2.5 TABLET, FILM COATED ORAL at 08:24

## 2024-01-01 RX ADMIN — SENNOSIDES AND DOCUSATE SODIUM 1 TABLET: 50; 8.6 TABLET ORAL at 20:30

## 2024-01-01 RX ADMIN — CARVEDILOL 12.5 MG: 12.5 TABLET, FILM COATED ORAL at 08:45

## 2024-01-01 RX ADMIN — ACETAMINOPHEN 975 MG: 325 TABLET, FILM COATED ORAL at 21:17

## 2024-01-01 RX ADMIN — ACETAMINOPHEN 650 MG: 325 TABLET, FILM COATED ORAL at 13:13

## 2024-01-01 RX ADMIN — ISOSORBIDE MONONITRATE 60 MG: 60 TABLET, EXTENDED RELEASE ORAL at 20:19

## 2024-01-01 RX ADMIN — CARVEDILOL 12.5 MG: 12.5 TABLET, FILM COATED ORAL at 08:23

## 2024-01-01 RX ADMIN — RAMELTEON 8 MG: 8 TABLET, FILM COATED ORAL at 20:50

## 2024-01-01 RX ADMIN — ISOSORBIDE MONONITRATE 60 MG: 60 TABLET, EXTENDED RELEASE ORAL at 09:36

## 2024-01-01 RX ADMIN — DEXTROSE 30 G: 15 GEL ORAL at 17:11

## 2024-01-01 RX ADMIN — ACETAMINOPHEN 975 MG: 325 TABLET, FILM COATED ORAL at 13:08

## 2024-01-01 RX ADMIN — ACETAMINOPHEN 1000 MG: 500 TABLET, FILM COATED ORAL at 21:22

## 2024-01-01 RX ADMIN — Medication 1 SPRAY: at 09:05

## 2024-01-01 RX ADMIN — CARVEDILOL 6.25 MG: 6.25 TABLET, FILM COATED ORAL at 08:16

## 2024-01-01 RX ADMIN — ACETAMINOPHEN 650 MG: 325 TABLET, FILM COATED ORAL at 13:06

## 2024-01-01 RX ADMIN — INSULIN ASPART 5 UNITS: 100 INJECTION, SOLUTION INTRAVENOUS; SUBCUTANEOUS at 08:27

## 2024-01-01 RX ADMIN — DEXTROSE MONOHYDRATE 50 ML: 25 INJECTION, SOLUTION INTRAVENOUS at 17:21

## 2024-01-01 RX ADMIN — INSULIN ASPART 4 UNITS: 100 INJECTION, SOLUTION INTRAVENOUS; SUBCUTANEOUS at 08:34

## 2024-01-01 RX ADMIN — MIRTAZAPINE 15 MG: 7.5 TABLET, FILM COATED ORAL at 20:28

## 2024-01-01 RX ADMIN — ISOSORBIDE MONONITRATE 60 MG: 30 TABLET, EXTENDED RELEASE ORAL at 08:59

## 2024-01-01 RX ADMIN — CARVEDILOL 6.25 MG: 6.25 TABLET, FILM COATED ORAL at 07:48

## 2024-01-01 RX ADMIN — ISOSORBIDE MONONITRATE 60 MG: 30 TABLET, EXTENDED RELEASE ORAL at 08:34

## 2024-01-01 RX ADMIN — ACETAMINOPHEN 975 MG: 325 TABLET, FILM COATED ORAL at 06:01

## 2024-01-01 RX ADMIN — RAMELTEON 8 MG: 8 TABLET, FILM COATED ORAL at 21:33

## 2024-01-01 RX ADMIN — ACETAMINOPHEN 650 MG: 325 TABLET, FILM COATED ORAL at 08:53

## 2024-01-01 RX ADMIN — CARVEDILOL 6.25 MG: 6.25 TABLET, FILM COATED ORAL at 19:00

## 2024-01-01 RX ADMIN — OXYCODONE HYDROCHLORIDE 2.5 MG: 5 TABLET ORAL at 09:24

## 2024-01-01 RX ADMIN — Medication 2 G: at 07:48

## 2024-01-01 RX ADMIN — INSULIN ASPART 3 UNITS: 100 INJECTION, SOLUTION INTRAVENOUS; SUBCUTANEOUS at 09:41

## 2024-01-01 RX ADMIN — LIDOCAINE 1 PATCH: 4 PATCH TOPICAL at 21:22

## 2024-01-01 RX ADMIN — DEXTROSE MONOHYDRATE 25 ML: 25 INJECTION, SOLUTION INTRAVENOUS at 16:50

## 2024-01-01 RX ADMIN — RAMELTEON 8 MG: 8 TABLET, FILM COATED ORAL at 20:19

## 2024-01-01 RX ADMIN — TAMSULOSIN HYDROCHLORIDE 0.4 MG: 0.4 CAPSULE ORAL at 08:44

## 2024-01-01 RX ADMIN — SODIUM CHLORIDE: 9 INJECTION, SOLUTION INTRAVENOUS at 00:33

## 2024-01-01 RX ADMIN — TORSEMIDE 5 MG: 5 TABLET ORAL at 11:33

## 2024-01-01 RX ADMIN — PHENYLEPHRINE HYDROCHLORIDE 150 MCG: 10 INJECTION INTRAVENOUS at 09:34

## 2024-01-01 RX ADMIN — ACETAMINOPHEN 650 MG: 325 TABLET, FILM COATED ORAL at 20:53

## 2024-01-01 RX ADMIN — INSULIN ASPART 2 UNITS: 100 INJECTION, SOLUTION INTRAVENOUS; SUBCUTANEOUS at 11:44

## 2024-01-01 RX ADMIN — INSULIN ASPART 3 UNITS: 100 INJECTION, SOLUTION INTRAVENOUS; SUBCUTANEOUS at 17:28

## 2024-01-01 RX ADMIN — CARVEDILOL 12.5 MG: 12.5 TABLET, FILM COATED ORAL at 17:30

## 2024-01-01 RX ADMIN — Medication 1 SPRAY: at 08:57

## 2024-01-01 RX ADMIN — ACETAMINOPHEN 650 MG: 325 TABLET, FILM COATED ORAL at 21:18

## 2024-01-01 RX ADMIN — ACETAMINOPHEN 650 MG: 325 TABLET, FILM COATED ORAL at 14:19

## 2024-01-01 RX ADMIN — DEXTROSE 30 G: 15 GEL ORAL at 17:42

## 2024-01-01 RX ADMIN — Medication 1 SPRAY: at 15:10

## 2024-01-01 RX ADMIN — AMLODIPINE BESYLATE 10 MG: 10 TABLET ORAL at 08:37

## 2024-01-01 RX ADMIN — ACETAMINOPHEN 650 MG: 325 TABLET, FILM COATED ORAL at 12:49

## 2024-01-01 RX ADMIN — TORSEMIDE 5 MG: 5 TABLET ORAL at 08:24

## 2024-01-01 RX ADMIN — TAMSULOSIN HYDROCHLORIDE 0.4 MG: 0.4 CAPSULE ORAL at 08:00

## 2024-01-01 RX ADMIN — LIDOCAINE 1 PATCH: 4 PATCH TOPICAL at 21:04

## 2024-01-01 RX ADMIN — RAMELTEON 8 MG: 8 TABLET, FILM COATED ORAL at 21:17

## 2024-01-01 RX ADMIN — RAMELTEON 8 MG: 8 TABLET, FILM COATED ORAL at 20:08

## 2024-01-01 RX ADMIN — AMLODIPINE BESYLATE 5 MG: 5 TABLET ORAL at 07:58

## 2024-01-01 RX ADMIN — IRBESARTAN 300 MG: 150 TABLET ORAL at 21:10

## 2024-01-01 RX ADMIN — MIRTAZAPINE 15 MG: 15 TABLET, FILM COATED ORAL at 21:03

## 2024-01-01 RX ADMIN — TORSEMIDE 5 MG: 5 TABLET ORAL at 07:49

## 2024-01-01 RX ADMIN — Medication 1 SPRAY: at 16:55

## 2024-01-01 RX ADMIN — AMLODIPINE BESYLATE 10 MG: 10 TABLET ORAL at 08:44

## 2024-01-01 RX ADMIN — OXYCODONE HYDROCHLORIDE 2.5 MG: 5 TABLET ORAL at 09:39

## 2024-01-01 RX ADMIN — CARVEDILOL 12.5 MG: 12.5 TABLET, FILM COATED ORAL at 08:53

## 2024-01-01 RX ADMIN — IRBESARTAN 300 MG: 150 TABLET ORAL at 19:52

## 2024-01-01 RX ADMIN — INSULIN ASPART 1 UNITS: 100 INJECTION, SOLUTION INTRAVENOUS; SUBCUTANEOUS at 12:30

## 2024-01-01 RX ADMIN — RAMELTEON 8 MG: 8 TABLET, FILM COATED ORAL at 20:17

## 2024-01-01 RX ADMIN — ISOSORBIDE MONONITRATE 60 MG: 30 TABLET, EXTENDED RELEASE ORAL at 10:45

## 2024-01-01 RX ADMIN — Medication 1000 ML: at 14:16

## 2024-01-01 RX ADMIN — AMLODIPINE BESYLATE 5 MG: 5 TABLET ORAL at 08:58

## 2024-01-01 RX ADMIN — ACETAMINOPHEN 650 MG: 325 TABLET, FILM COATED ORAL at 08:03

## 2024-01-01 RX ADMIN — ACETAMINOPHEN 1000 MG: 500 TABLET, FILM COATED ORAL at 18:18

## 2024-01-01 RX ADMIN — CARVEDILOL 12.5 MG: 12.5 TABLET, FILM COATED ORAL at 08:39

## 2024-01-01 RX ADMIN — ENOXAPARIN SODIUM 40 MG: 40 INJECTION SUBCUTANEOUS at 12:34

## 2024-01-01 RX ADMIN — ACETAMINOPHEN 1000 MG: 500 TABLET, FILM COATED ORAL at 13:04

## 2024-01-01 RX ADMIN — POLYETHYLENE GLYCOL 3350 17 G: 17 POWDER, FOR SOLUTION ORAL at 08:33

## 2024-01-01 RX ADMIN — QUETIAPINE FUMARATE 25 MG: 25 TABLET ORAL at 04:07

## 2024-01-01 RX ADMIN — SENNOSIDES AND DOCUSATE SODIUM 1 TABLET: 50; 8.6 TABLET ORAL at 08:25

## 2024-01-01 RX ADMIN — ISOSORBIDE MONONITRATE 60 MG: 30 TABLET, EXTENDED RELEASE ORAL at 07:49

## 2024-01-01 RX ADMIN — CARVEDILOL 12.5 MG: 12.5 TABLET, FILM COATED ORAL at 16:49

## 2024-01-01 RX ADMIN — INSULIN HUMAN 5.5 UNITS/HR: 1 INJECTION, SOLUTION INTRAVENOUS at 21:05

## 2024-01-01 RX ADMIN — INSULIN ASPART 6 UNITS: 100 INJECTION, SOLUTION INTRAVENOUS; SUBCUTANEOUS at 08:57

## 2024-01-01 RX ADMIN — TAMSULOSIN HYDROCHLORIDE 0.4 MG: 0.4 CAPSULE ORAL at 08:45

## 2024-01-01 RX ADMIN — ENOXAPARIN SODIUM 40 MG: 40 INJECTION SUBCUTANEOUS at 16:18

## 2024-01-01 RX ADMIN — INSULIN ASPART 4 UNITS: 100 INJECTION, SOLUTION INTRAVENOUS; SUBCUTANEOUS at 12:45

## 2024-01-01 RX ADMIN — POLYETHYLENE GLYCOL 3350 17 G: 17 POWDER, FOR SOLUTION ORAL at 08:42

## 2024-01-01 RX ADMIN — INSULIN ASPART 1 UNITS: 100 INJECTION, SOLUTION INTRAVENOUS; SUBCUTANEOUS at 12:57

## 2024-01-01 RX ADMIN — ACETAMINOPHEN 650 MG: 325 TABLET, FILM COATED ORAL at 20:11

## 2024-01-01 RX ADMIN — SENNOSIDES AND DOCUSATE SODIUM 1 TABLET: 50; 8.6 TABLET ORAL at 21:03

## 2024-01-01 RX ADMIN — CARVEDILOL 12.5 MG: 12.5 TABLET, FILM COATED ORAL at 17:28

## 2024-01-01 RX ADMIN — INSULIN ASPART 6 UNITS: 100 INJECTION, SOLUTION INTRAVENOUS; SUBCUTANEOUS at 08:13

## 2024-01-01 RX ADMIN — AMLODIPINE BESYLATE 5 MG: 5 TABLET ORAL at 08:15

## 2024-01-01 RX ADMIN — ACETAMINOPHEN 650 MG: 325 TABLET, FILM COATED ORAL at 20:33

## 2024-01-01 RX ADMIN — ISOSORBIDE MONONITRATE 60 MG: 60 TABLET, EXTENDED RELEASE ORAL at 21:24

## 2024-01-01 RX ADMIN — AMLODIPINE BESYLATE 5 MG: 5 TABLET ORAL at 08:24

## 2024-01-01 RX ADMIN — OXYCODONE HYDROCHLORIDE 2.5 MG: 5 TABLET ORAL at 13:51

## 2024-01-01 RX ADMIN — LIDOCAINE 1 PATCH: 4 PATCH TOPICAL at 20:50

## 2024-01-01 RX ADMIN — ACETAMINOPHEN 650 MG: 325 TABLET, FILM COATED ORAL at 13:21

## 2024-01-01 RX ADMIN — LIDOCAINE 1 PATCH: 4 PATCH TOPICAL at 17:37

## 2024-01-01 RX ADMIN — ISOSORBIDE MONONITRATE 60 MG: 30 TABLET, EXTENDED RELEASE ORAL at 08:16

## 2024-01-01 RX ADMIN — ACETAMINOPHEN 650 MG: 325 TABLET, FILM COATED ORAL at 08:45

## 2024-01-01 RX ADMIN — INSULIN ASPART 4 UNITS: 100 INJECTION, SOLUTION INTRAVENOUS; SUBCUTANEOUS at 08:08

## 2024-01-01 RX ADMIN — AMLODIPINE BESYLATE 10 MG: 10 TABLET ORAL at 08:40

## 2024-01-01 RX ADMIN — CARVEDILOL 12.5 MG: 12.5 TABLET, FILM COATED ORAL at 08:09

## 2024-01-01 RX ADMIN — FAMOTIDINE 20 MG: 20 TABLET, FILM COATED ORAL at 09:00

## 2024-01-01 RX ADMIN — AMLODIPINE BESYLATE 10 MG: 10 TABLET ORAL at 08:01

## 2024-01-01 RX ADMIN — MIRTAZAPINE 7.5 MG: 7.5 TABLET, FILM COATED ORAL at 21:17

## 2024-01-01 RX ADMIN — ACETAMINOPHEN 1000 MG: 500 TABLET, FILM COATED ORAL at 21:16

## 2024-01-01 RX ADMIN — AMLODIPINE BESYLATE 10 MG: 10 TABLET ORAL at 08:00

## 2024-01-01 RX ADMIN — MIRTAZAPINE 15 MG: 15 TABLET, FILM COATED ORAL at 20:30

## 2024-01-01 RX ADMIN — TAMSULOSIN HYDROCHLORIDE 0.4 MG: 0.4 CAPSULE ORAL at 08:53

## 2024-01-01 RX ADMIN — Medication 1 SPRAY: at 21:18

## 2024-01-01 RX ADMIN — Medication 1 SPRAY: at 09:22

## 2024-01-01 RX ADMIN — AMLODIPINE BESYLATE 10 MG: 10 TABLET ORAL at 08:24

## 2024-01-01 RX ADMIN — ENOXAPARIN SODIUM 40 MG: 40 INJECTION SUBCUTANEOUS at 14:01

## 2024-01-01 RX ADMIN — CARVEDILOL 12.5 MG: 12.5 TABLET, FILM COATED ORAL at 17:10

## 2024-01-01 RX ADMIN — ACETAMINOPHEN 650 MG: 325 TABLET, FILM COATED ORAL at 08:42

## 2024-01-01 RX ADMIN — ENOXAPARIN SODIUM 40 MG: 40 INJECTION SUBCUTANEOUS at 12:46

## 2024-01-01 RX ADMIN — INSULIN ASPART 2 UNITS: 100 INJECTION, SOLUTION INTRAVENOUS; SUBCUTANEOUS at 09:26

## 2024-01-01 RX ADMIN — INSULIN ASPART 4 UNITS: 100 INJECTION, SOLUTION INTRAVENOUS; SUBCUTANEOUS at 14:05

## 2024-01-01 RX ADMIN — APIXABAN 2.5 MG: 2.5 TABLET, FILM COATED ORAL at 08:42

## 2024-01-01 RX ADMIN — TAMSULOSIN HYDROCHLORIDE 0.4 MG: 0.4 CAPSULE ORAL at 07:49

## 2024-01-01 RX ADMIN — TAMSULOSIN HYDROCHLORIDE 0.4 MG: 0.4 CAPSULE ORAL at 21:22

## 2024-01-01 RX ADMIN — POLYETHYLENE GLYCOL 3350 17 G: 17 POWDER, FOR SOLUTION ORAL at 08:00

## 2024-01-01 RX ADMIN — OXYCODONE HYDROCHLORIDE 2.5 MG: 5 TABLET ORAL at 16:14

## 2024-01-01 RX ADMIN — PHENYLEPHRINE HYDROCHLORIDE 100 MCG: 10 INJECTION INTRAVENOUS at 09:00

## 2024-01-01 RX ADMIN — Medication 1 SPRAY: at 12:44

## 2024-01-01 RX ADMIN — RAMELTEON 8 MG: 8 TABLET, FILM COATED ORAL at 21:21

## 2024-01-01 RX ADMIN — MIRTAZAPINE 15 MG: 15 TABLET, FILM COATED ORAL at 21:08

## 2024-01-01 RX ADMIN — ACETAMINOPHEN 650 MG: 325 TABLET, FILM COATED ORAL at 08:39

## 2024-01-01 RX ADMIN — AMLODIPINE BESYLATE 10 MG: 10 TABLET ORAL at 07:52

## 2024-01-01 RX ADMIN — QUETIAPINE FUMARATE 25 MG: 25 TABLET ORAL at 23:38

## 2024-01-01 RX ADMIN — APIXABAN 2.5 MG: 2.5 TABLET, FILM COATED ORAL at 09:22

## 2024-01-01 RX ADMIN — AMLODIPINE BESYLATE 10 MG: 5 TABLET ORAL at 08:44

## 2024-01-01 RX ADMIN — MIRTAZAPINE 15 MG: 7.5 TABLET, FILM COATED ORAL at 20:33

## 2024-01-01 RX ADMIN — SODIUM CHLORIDE, POTASSIUM CHLORIDE, SODIUM LACTATE AND CALCIUM CHLORIDE: 600; 310; 30; 20 INJECTION, SOLUTION INTRAVENOUS at 06:48

## 2024-01-01 RX ADMIN — AMLODIPINE BESYLATE 10 MG: 10 TABLET ORAL at 08:46

## 2024-01-01 RX ADMIN — Medication 1 SPRAY: at 17:28

## 2024-01-01 RX ADMIN — IRON SUCROSE 200 MG: 20 INJECTION, SOLUTION INTRAVENOUS at 12:10

## 2024-01-01 RX ADMIN — PHENYLEPHRINE HYDROCHLORIDE 100 MCG: 10 INJECTION INTRAVENOUS at 09:50

## 2024-01-01 RX ADMIN — ISOSORBIDE MONONITRATE 60 MG: 60 TABLET, EXTENDED RELEASE ORAL at 20:50

## 2024-01-01 RX ADMIN — CARVEDILOL 12.5 MG: 12.5 TABLET, FILM COATED ORAL at 18:20

## 2024-01-01 RX ADMIN — TAMSULOSIN HYDROCHLORIDE 0.4 MG: 0.4 CAPSULE ORAL at 21:16

## 2024-01-01 RX ADMIN — MIRTAZAPINE 15 MG: 15 TABLET, FILM COATED ORAL at 21:17

## 2024-01-01 RX ADMIN — CEFTRIAXONE SODIUM 1 G: 1 INJECTION, POWDER, FOR SOLUTION INTRAMUSCULAR; INTRAVENOUS at 21:12

## 2024-01-01 RX ADMIN — INSULIN ASPART 2 UNITS: 100 INJECTION, SOLUTION INTRAVENOUS; SUBCUTANEOUS at 08:23

## 2024-01-01 RX ADMIN — ISOSORBIDE MONONITRATE 60 MG: 60 TABLET, EXTENDED RELEASE ORAL at 08:45

## 2024-01-01 RX ADMIN — INSULIN ASPART 6 UNITS: 100 INJECTION, SOLUTION INTRAVENOUS; SUBCUTANEOUS at 09:29

## 2024-01-01 RX ADMIN — SODIUM CHLORIDE, POTASSIUM CHLORIDE, SODIUM LACTATE AND CALCIUM CHLORIDE: 600; 310; 30; 20 INJECTION, SOLUTION INTRAVENOUS at 09:32

## 2024-01-01 RX ADMIN — Medication 1 SPRAY: at 16:10

## 2024-01-01 RX ADMIN — Medication 1 SPRAY: at 17:38

## 2024-01-01 RX ADMIN — SODIUM CHLORIDE, SODIUM LACTATE, POTASSIUM CHLORIDE, CALCIUM CHLORIDE: 600; 310; 30; 20 INJECTION, SOLUTION INTRAVENOUS at 14:11

## 2024-01-01 RX ADMIN — MIRTAZAPINE 15 MG: 15 TABLET, FILM COATED ORAL at 22:17

## 2024-01-01 RX ADMIN — Medication 1 SPRAY: at 08:18

## 2024-01-01 RX ADMIN — TAMSULOSIN HYDROCHLORIDE 0.4 MG: 0.4 CAPSULE ORAL at 21:09

## 2024-01-01 RX ADMIN — ISOSORBIDE MONONITRATE 60 MG: 30 TABLET, EXTENDED RELEASE ORAL at 08:14

## 2024-01-01 RX ADMIN — Medication 1 SPRAY: at 16:21

## 2024-01-01 RX ADMIN — LIDOCAINE 1 PATCH: 4 PATCH TOPICAL at 21:20

## 2024-01-01 RX ADMIN — TAMSULOSIN HYDROCHLORIDE 0.4 MG: 0.4 CAPSULE ORAL at 09:36

## 2024-01-01 RX ADMIN — Medication 1 SPRAY: at 19:53

## 2024-01-01 RX ADMIN — Medication 1 SPRAY: at 08:46

## 2024-01-01 RX ADMIN — SODIUM CHLORIDE, POTASSIUM CHLORIDE, SODIUM LACTATE AND CALCIUM CHLORIDE 1000 ML: 600; 310; 30; 20 INJECTION, SOLUTION INTRAVENOUS at 01:03

## 2024-01-01 RX ADMIN — ISOSORBIDE MONONITRATE 60 MG: 60 TABLET, EXTENDED RELEASE ORAL at 21:05

## 2024-01-01 RX ADMIN — ACETAMINOPHEN 650 MG: 325 TABLET, FILM COATED ORAL at 13:12

## 2024-01-01 RX ADMIN — ENOXAPARIN SODIUM 40 MG: 40 INJECTION SUBCUTANEOUS at 12:32

## 2024-01-01 RX ADMIN — Medication 1 SPRAY: at 17:33

## 2024-01-01 RX ADMIN — CARVEDILOL 12.5 MG: 12.5 TABLET, FILM COATED ORAL at 09:41

## 2024-01-01 RX ADMIN — ACETAMINOPHEN 650 MG: 325 TABLET, FILM COATED ORAL at 21:32

## 2024-01-01 RX ADMIN — ACETAMINOPHEN 650 MG: 325 TABLET, FILM COATED ORAL at 20:32

## 2024-01-01 RX ADMIN — SODIUM CHLORIDE, SODIUM LACTATE, POTASSIUM CHLORIDE, CALCIUM CHLORIDE: 600; 310; 30; 20 INJECTION, SOLUTION INTRAVENOUS at 14:02

## 2024-01-01 RX ADMIN — OXYCODONE HYDROCHLORIDE 5 MG: 5 TABLET ORAL at 19:21

## 2024-01-01 RX ADMIN — CARVEDILOL 6.25 MG: 6.25 TABLET, FILM COATED ORAL at 08:46

## 2024-01-01 RX ADMIN — ISOSORBIDE MONONITRATE 60 MG: 60 TABLET, EXTENDED RELEASE ORAL at 20:11

## 2024-01-01 RX ADMIN — ENOXAPARIN SODIUM 40 MG: 40 INJECTION SUBCUTANEOUS at 09:10

## 2024-01-01 RX ADMIN — Medication 10 MG: at 21:11

## 2024-01-01 RX ADMIN — TAMSULOSIN HYDROCHLORIDE 0.4 MG: 0.4 CAPSULE ORAL at 20:30

## 2024-01-01 RX ADMIN — ACETAMINOPHEN 1000 MG: 500 TABLET, FILM COATED ORAL at 20:06

## 2024-01-01 RX ADMIN — MIRTAZAPINE 15 MG: 15 TABLET, FILM COATED ORAL at 21:30

## 2024-01-01 RX ADMIN — OXYCODONE HYDROCHLORIDE 2.5 MG: 5 TABLET ORAL at 18:02

## 2024-01-01 RX ADMIN — ISOSORBIDE MONONITRATE 60 MG: 60 TABLET, EXTENDED RELEASE ORAL at 20:17

## 2024-01-01 RX ADMIN — LIDOCAINE 1 PATCH: 4 PATCH TOPICAL at 20:32

## 2024-01-01 RX ADMIN — INSULIN ASPART 1 UNITS: 100 INJECTION, SOLUTION INTRAVENOUS; SUBCUTANEOUS at 17:18

## 2024-01-01 RX ADMIN — AMLODIPINE BESYLATE 10 MG: 10 TABLET ORAL at 09:15

## 2024-01-01 RX ADMIN — APIXABAN 2.5 MG: 2.5 TABLET, FILM COATED ORAL at 20:49

## 2024-01-01 RX ADMIN — CARVEDILOL 12.5 MG: 12.5 TABLET, FILM COATED ORAL at 08:36

## 2024-01-01 RX ADMIN — ACETAMINOPHEN 650 MG: 325 TABLET, FILM COATED ORAL at 21:04

## 2024-01-01 RX ADMIN — TAMSULOSIN HYDROCHLORIDE 0.4 MG: 0.4 CAPSULE ORAL at 08:12

## 2024-01-01 RX ADMIN — ACETAMINOPHEN 650 MG: 325 TABLET, FILM COATED ORAL at 13:00

## 2024-01-01 RX ADMIN — LIDOCAINE 1 PATCH: 4 PATCH TOPICAL at 21:30

## 2024-01-01 RX ADMIN — ACETAMINOPHEN 650 MG: 325 TABLET, FILM COATED ORAL at 08:44

## 2024-01-01 RX ADMIN — ACETAMINOPHEN 650 MG: 325 TABLET, FILM COATED ORAL at 08:24

## 2024-01-01 RX ADMIN — TAMSULOSIN HYDROCHLORIDE 0.4 MG: 0.4 CAPSULE ORAL at 08:18

## 2024-01-01 RX ADMIN — MIRTAZAPINE 15 MG: 7.5 TABLET, FILM COATED ORAL at 20:11

## 2024-01-01 RX ADMIN — LIDOCAINE 1 PATCH: 4 PATCH TOPICAL at 20:18

## 2024-01-01 RX ADMIN — FAMOTIDINE 20 MG: 20 TABLET, FILM COATED ORAL at 08:56

## 2024-01-01 RX ADMIN — SENNOSIDES AND DOCUSATE SODIUM 1 TABLET: 50; 8.6 TABLET ORAL at 08:45

## 2024-01-01 RX ADMIN — Medication 1 SPRAY: at 09:24

## 2024-01-01 RX ADMIN — IRBESARTAN 300 MG: 150 TABLET ORAL at 21:16

## 2024-01-01 RX ADMIN — TORSEMIDE 5 MG: 5 TABLET ORAL at 09:00

## 2024-01-01 RX ADMIN — DEXTROSE 30 G: 15 GEL ORAL at 21:57

## 2024-01-01 RX ADMIN — TAMSULOSIN HYDROCHLORIDE 0.4 MG: 0.4 CAPSULE ORAL at 22:30

## 2024-01-01 RX ADMIN — ACETAMINOPHEN 1000 MG: 500 TABLET, FILM COATED ORAL at 14:18

## 2024-01-01 RX ADMIN — RAMELTEON 8 MG: 8 TABLET, FILM COATED ORAL at 20:00

## 2024-01-01 RX ADMIN — CARVEDILOL 6.25 MG: 6.25 TABLET, FILM COATED ORAL at 07:58

## 2024-01-01 RX ADMIN — AMLODIPINE BESYLATE 10 MG: 10 TABLET ORAL at 08:36

## 2024-01-01 RX ADMIN — IRBESARTAN 300 MG: 150 TABLET ORAL at 20:17

## 2024-01-01 RX ADMIN — DEXTROSE MONOHYDRATE 25 ML: 25 INJECTION, SOLUTION INTRAVENOUS at 21:31

## 2024-01-01 RX ADMIN — TORSEMIDE 5 MG: 5 TABLET ORAL at 08:34

## 2024-01-01 RX ADMIN — TAMSULOSIN HYDROCHLORIDE 0.4 MG: 0.4 CAPSULE ORAL at 09:15

## 2024-01-01 RX ADMIN — OXYCODONE HYDROCHLORIDE 2.5 MG: 5 TABLET ORAL at 04:05

## 2024-01-01 RX ADMIN — AMLODIPINE BESYLATE 10 MG: 10 TABLET ORAL at 07:48

## 2024-01-01 RX ADMIN — Medication 1 SPRAY: at 13:25

## 2024-01-01 RX ADMIN — ACETAMINOPHEN 1000 MG: 500 TABLET, FILM COATED ORAL at 17:36

## 2024-01-01 RX ADMIN — TAMSULOSIN HYDROCHLORIDE 0.4 MG: 0.4 CAPSULE ORAL at 08:36

## 2024-01-01 RX ADMIN — MIRTAZAPINE 15 MG: 7.5 TABLET, FILM COATED ORAL at 20:53

## 2024-01-01 RX ADMIN — CARVEDILOL 12.5 MG: 12.5 TABLET, FILM COATED ORAL at 08:44

## 2024-01-01 RX ADMIN — IRBESARTAN 300 MG: 150 TABLET ORAL at 20:11

## 2024-01-01 RX ADMIN — CEFTRIAXONE SODIUM 1 G: 1 INJECTION, POWDER, FOR SOLUTION INTRAMUSCULAR; INTRAVENOUS at 20:07

## 2024-01-01 RX ADMIN — RAMELTEON 8 MG: 8 TABLET, FILM COATED ORAL at 20:38

## 2024-01-01 RX ADMIN — CARVEDILOL 12.5 MG: 12.5 TABLET, FILM COATED ORAL at 08:42

## 2024-01-01 RX ADMIN — ACETAMINOPHEN 975 MG: 325 TABLET, FILM COATED ORAL at 20:33

## 2024-01-01 RX ADMIN — MIRTAZAPINE 7.5 MG: 7.5 TABLET, FILM COATED ORAL at 21:22

## 2024-01-01 RX ADMIN — INSULIN ASPART 4 UNITS: 100 INJECTION, SOLUTION INTRAVENOUS; SUBCUTANEOUS at 17:50

## 2024-01-01 RX ADMIN — CARVEDILOL 12.5 MG: 12.5 TABLET, FILM COATED ORAL at 17:37

## 2024-01-01 RX ADMIN — CARVEDILOL 12.5 MG: 12.5 TABLET, FILM COATED ORAL at 17:45

## 2024-01-01 RX ADMIN — OXYCODONE HYDROCHLORIDE 2.5 MG: 5 TABLET ORAL at 13:21

## 2024-01-01 RX ADMIN — ACETAMINOPHEN 1000 MG: 500 TABLET, FILM COATED ORAL at 21:03

## 2024-01-01 RX ADMIN — AMLODIPINE BESYLATE 10 MG: 10 TABLET ORAL at 09:25

## 2024-01-01 RX ADMIN — TAMSULOSIN HYDROCHLORIDE 0.4 MG: 0.4 CAPSULE ORAL at 08:10

## 2024-01-01 RX ADMIN — CEFAZOLIN 1 G: 1 INJECTION, POWDER, FOR SOLUTION INTRAMUSCULAR; INTRAVENOUS at 16:06

## 2024-01-01 RX ADMIN — Medication 1 SPRAY: at 08:36

## 2024-01-01 RX ADMIN — INSULIN ASPART 4 UNITS: 100 INJECTION, SOLUTION INTRAVENOUS; SUBCUTANEOUS at 20:53

## 2024-01-01 RX ADMIN — Medication 1 SPRAY: at 19:33

## 2024-01-01 RX ADMIN — Medication 1 SPRAY: at 12:00

## 2024-01-01 RX ADMIN — ISOSORBIDE MONONITRATE 60 MG: 60 TABLET, EXTENDED RELEASE ORAL at 21:22

## 2024-01-01 RX ADMIN — AMLODIPINE BESYLATE 10 MG: 10 TABLET ORAL at 09:32

## 2024-01-01 RX ADMIN — CARVEDILOL 12.5 MG: 12.5 TABLET, FILM COATED ORAL at 08:18

## 2024-01-01 RX ADMIN — ACETAMINOPHEN 650 MG: 325 TABLET, FILM COATED ORAL at 13:20

## 2024-01-01 RX ADMIN — CARVEDILOL 6.25 MG: 6.25 TABLET, FILM COATED ORAL at 08:33

## 2024-01-01 RX ADMIN — AMLODIPINE BESYLATE 5 MG: 5 TABLET ORAL at 09:10

## 2024-01-01 RX ADMIN — ACETAMINOPHEN 650 MG: 325 TABLET, FILM COATED ORAL at 08:07

## 2024-01-01 RX ADMIN — RAMELTEON 8 MG: 8 TABLET, FILM COATED ORAL at 19:52

## 2024-01-01 RX ADMIN — IRBESARTAN 300 MG: 150 TABLET ORAL at 21:32

## 2024-01-01 RX ADMIN — INSULIN GLARGINE 12 UNITS: 100 INJECTION, SOLUTION SUBCUTANEOUS at 22:53

## 2024-01-01 RX ADMIN — LIDOCAINE 1 PATCH: 4 PATCH TOPICAL at 20:33

## 2024-01-01 RX ADMIN — SENNOSIDES AND DOCUSATE SODIUM 1 TABLET: 50; 8.6 TABLET ORAL at 20:49

## 2024-01-01 RX ADMIN — TAMSULOSIN HYDROCHLORIDE 0.4 MG: 0.4 CAPSULE ORAL at 21:08

## 2024-01-01 RX ADMIN — ACETAMINOPHEN 975 MG: 325 TABLET, FILM COATED ORAL at 13:31

## 2024-01-01 RX ADMIN — ENOXAPARIN SODIUM 40 MG: 40 INJECTION SUBCUTANEOUS at 12:19

## 2024-01-01 RX ADMIN — ENOXAPARIN SODIUM 40 MG: 40 INJECTION SUBCUTANEOUS at 12:59

## 2024-01-01 RX ADMIN — ACETAMINOPHEN 650 MG: 325 TABLET, FILM COATED ORAL at 00:10

## 2024-01-01 RX ADMIN — IRBESARTAN 300 MG: 150 TABLET ORAL at 20:19

## 2024-01-01 RX ADMIN — HYDROMORPHONE HYDROCHLORIDE 0.2 MG: 0.2 INJECTION, SOLUTION INTRAMUSCULAR; INTRAVENOUS; SUBCUTANEOUS at 11:31

## 2024-01-01 RX ADMIN — SODIUM CHLORIDE, POTASSIUM CHLORIDE, SODIUM LACTATE AND CALCIUM CHLORIDE: 600; 310; 30; 20 INJECTION, SOLUTION INTRAVENOUS at 21:30

## 2024-01-01 RX ADMIN — Medication 1 SPRAY: at 18:02

## 2024-01-01 RX ADMIN — ACETAMINOPHEN 1000 MG: 500 TABLET, FILM COATED ORAL at 19:57

## 2024-01-01 RX ADMIN — AMLODIPINE BESYLATE 10 MG: 10 TABLET ORAL at 08:03

## 2024-01-01 RX ADMIN — ENOXAPARIN SODIUM 40 MG: 40 INJECTION SUBCUTANEOUS at 12:07

## 2024-01-01 RX ADMIN — INSULIN ASPART 2 UNITS: 100 INJECTION, SOLUTION INTRAVENOUS; SUBCUTANEOUS at 08:16

## 2024-01-01 RX ADMIN — INSULIN ASPART 1 UNITS: 100 INJECTION, SOLUTION INTRAVENOUS; SUBCUTANEOUS at 08:49

## 2024-01-01 RX ADMIN — POLYETHYLENE GLYCOL 3350 17 G: 17 POWDER, FOR SOLUTION ORAL at 08:24

## 2024-01-01 RX ADMIN — CARVEDILOL 6.25 MG: 6.25 TABLET, FILM COATED ORAL at 09:23

## 2024-01-01 RX ADMIN — FAMOTIDINE 20 MG: 20 TABLET, FILM COATED ORAL at 08:46

## 2024-01-01 RX ADMIN — ACETAMINOPHEN 1000 MG: 500 TABLET, FILM COATED ORAL at 21:09

## 2024-01-01 RX ADMIN — ACETAMINOPHEN 650 MG: 325 TABLET, FILM COATED ORAL at 20:19

## 2024-01-01 RX ADMIN — ACETAMINOPHEN 1000 MG: 500 TABLET, FILM COATED ORAL at 08:43

## 2024-01-01 RX ADMIN — CARVEDILOL 6.25 MG: 6.25 TABLET, FILM COATED ORAL at 17:23

## 2024-01-01 RX ADMIN — AMLODIPINE BESYLATE 5 MG: 5 TABLET ORAL at 07:49

## 2024-01-01 RX ADMIN — Medication 1 SPRAY: at 12:48

## 2024-01-01 RX ADMIN — ACETAMINOPHEN 650 MG: 325 TABLET, FILM COATED ORAL at 08:01

## 2024-01-01 RX ADMIN — AMLODIPINE BESYLATE 10 MG: 10 TABLET ORAL at 08:16

## 2024-01-01 RX ADMIN — ACETAMINOPHEN 1000 MG: 500 TABLET, FILM COATED ORAL at 19:21

## 2024-01-01 RX ADMIN — ENOXAPARIN SODIUM 40 MG: 40 INJECTION SUBCUTANEOUS at 12:40

## 2024-01-01 RX ADMIN — ACETAMINOPHEN 1000 MG: 500 TABLET, FILM COATED ORAL at 07:58

## 2024-01-01 RX ADMIN — INSULIN ASPART 3 UNITS: 100 INJECTION, SOLUTION INTRAVENOUS; SUBCUTANEOUS at 07:55

## 2024-01-01 RX ADMIN — FENTANYL CITRATE 100 MCG: 50 INJECTION INTRAMUSCULAR; INTRAVENOUS at 07:54

## 2024-01-01 RX ADMIN — TORSEMIDE 5 MG: 5 TABLET ORAL at 09:11

## 2024-01-01 RX ADMIN — FAMOTIDINE 20 MG: 20 TABLET, FILM COATED ORAL at 08:24

## 2024-01-01 RX ADMIN — APIXABAN 5 MG: 5 TABLET, FILM COATED ORAL at 21:03

## 2024-01-01 RX ADMIN — CARVEDILOL 12.5 MG: 12.5 TABLET, FILM COATED ORAL at 07:52

## 2024-01-01 RX ADMIN — INSULIN ASPART 8 UNITS: 100 INJECTION, SOLUTION INTRAVENOUS; SUBCUTANEOUS at 08:42

## 2024-01-01 RX ADMIN — ASPIRIN 81 MG: 81 TABLET, COATED ORAL at 08:00

## 2024-01-01 RX ADMIN — CARVEDILOL 12.5 MG: 12.5 TABLET, FILM COATED ORAL at 17:52

## 2024-01-01 RX ADMIN — INSULIN ASPART 4 UNITS: 100 INJECTION, SOLUTION INTRAVENOUS; SUBCUTANEOUS at 08:33

## 2024-01-01 RX ADMIN — CARVEDILOL 12.5 MG: 12.5 TABLET, FILM COATED ORAL at 09:36

## 2024-01-01 RX ADMIN — CARVEDILOL 12.5 MG: 12.5 TABLET, FILM COATED ORAL at 16:14

## 2024-01-01 RX ADMIN — SENNOSIDES AND DOCUSATE SODIUM 1 TABLET: 50; 8.6 TABLET ORAL at 21:15

## 2024-01-01 RX ADMIN — MIRTAZAPINE 15 MG: 15 TABLET, FILM COATED ORAL at 21:05

## 2024-01-01 RX ADMIN — TAMSULOSIN HYDROCHLORIDE 0.4 MG: 0.4 CAPSULE ORAL at 08:23

## 2024-01-01 RX ADMIN — PHENYLEPHRINE HYDROCHLORIDE 100 MCG: 10 INJECTION INTRAVENOUS at 09:31

## 2024-01-01 RX ADMIN — ISOSORBIDE MONONITRATE 60 MG: 60 TABLET, EXTENDED RELEASE ORAL at 19:57

## 2024-01-01 RX ADMIN — AMLODIPINE BESYLATE 10 MG: 10 TABLET ORAL at 09:36

## 2024-01-01 RX ADMIN — FAMOTIDINE 20 MG: 20 TABLET, FILM COATED ORAL at 09:10

## 2024-01-01 RX ADMIN — Medication 1 SPRAY: at 12:49

## 2024-01-01 RX ADMIN — MIRTAZAPINE 15 MG: 15 TABLET, FILM COATED ORAL at 22:53

## 2024-01-01 RX ADMIN — DEXTROSE 15 G: 15 GEL ORAL at 17:19

## 2024-01-01 RX ADMIN — ACETAMINOPHEN 650 MG: 325 TABLET, FILM COATED ORAL at 08:23

## 2024-01-01 RX ADMIN — Medication 10 MG: at 23:40

## 2024-01-01 RX ADMIN — CARVEDILOL 6.25 MG: 6.25 TABLET, FILM COATED ORAL at 18:25

## 2024-01-01 RX ADMIN — TAMSULOSIN HYDROCHLORIDE 0.4 MG: 0.4 CAPSULE ORAL at 08:16

## 2024-01-01 RX ADMIN — ENOXAPARIN SODIUM 40 MG: 40 INJECTION SUBCUTANEOUS at 13:00

## 2024-01-01 RX ADMIN — CARVEDILOL 12.5 MG: 12.5 TABLET, FILM COATED ORAL at 07:57

## 2024-01-01 RX ADMIN — IRBESARTAN 300 MG: 150 TABLET ORAL at 21:21

## 2024-01-01 RX ADMIN — ACETAMINOPHEN 650 MG: 325 TABLET, FILM COATED ORAL at 15:48

## 2024-01-01 RX ADMIN — IRON SUCROSE 200 MG: 20 INJECTION, SOLUTION INTRAVENOUS at 11:35

## 2024-01-01 RX ADMIN — SODIUM CHLORIDE 1000 ML: 9 INJECTION, SOLUTION INTRAVENOUS at 18:54

## 2024-01-01 RX ADMIN — ISOSORBIDE MONONITRATE 60 MG: 60 TABLET, EXTENDED RELEASE ORAL at 20:27

## 2024-01-01 RX ADMIN — IRBESARTAN 300 MG: 150 TABLET ORAL at 20:01

## 2024-01-01 RX ADMIN — CARVEDILOL 12.5 MG: 12.5 TABLET, FILM COATED ORAL at 17:21

## 2024-01-01 RX ADMIN — ISOSORBIDE MONONITRATE 60 MG: 30 TABLET, EXTENDED RELEASE ORAL at 08:56

## 2024-01-01 RX ADMIN — DEXTROSE 15 G: 15 GEL ORAL at 01:51

## 2024-01-01 RX ADMIN — Medication: at 17:33

## 2024-01-01 RX ADMIN — CEFTRIAXONE SODIUM 1 G: 1 INJECTION, POWDER, FOR SOLUTION INTRAMUSCULAR; INTRAVENOUS at 21:22

## 2024-01-01 RX ADMIN — INSULIN ASPART 4 UNITS: 100 INJECTION, SOLUTION INTRAVENOUS; SUBCUTANEOUS at 08:44

## 2024-01-01 RX ADMIN — AMLODIPINE BESYLATE 5 MG: 5 TABLET ORAL at 09:22

## 2024-01-01 RX ADMIN — TAMSULOSIN HYDROCHLORIDE 0.4 MG: 0.4 CAPSULE ORAL at 08:40

## 2024-01-01 RX ADMIN — ACETAMINOPHEN 650 MG: 325 TABLET, FILM COATED ORAL at 07:52

## 2024-01-01 RX ADMIN — Medication 1 SPRAY: at 19:57

## 2024-01-01 RX ADMIN — APIXABAN 5 MG: 5 TABLET, FILM COATED ORAL at 08:46

## 2024-01-01 RX ADMIN — RAMELTEON 8 MG: 8 TABLET, FILM COATED ORAL at 20:33

## 2024-01-01 RX ADMIN — ISOSORBIDE MONONITRATE 60 MG: 60 TABLET, EXTENDED RELEASE ORAL at 20:33

## 2024-01-01 RX ADMIN — Medication 1 SPRAY: at 08:16

## 2024-01-01 RX ADMIN — PROPOFOL 30 MCG/KG/MIN: 10 INJECTION, EMULSION INTRAVENOUS at 08:27

## 2024-01-01 RX ADMIN — Medication 1 SPRAY: at 16:14

## 2024-01-01 RX ADMIN — ENOXAPARIN SODIUM 40 MG: 40 INJECTION SUBCUTANEOUS at 13:11

## 2024-01-01 RX ADMIN — SUGAMMADEX 150 MG: 100 INJECTION, SOLUTION INTRAVENOUS at 09:58

## 2024-01-01 RX ADMIN — CARVEDILOL 12.5 MG: 12.5 TABLET, FILM COATED ORAL at 08:10

## 2024-01-01 RX ADMIN — ENOXAPARIN SODIUM 40 MG: 40 INJECTION SUBCUTANEOUS at 13:21

## 2024-01-01 RX ADMIN — CARVEDILOL 12.5 MG: 12.5 TABLET, FILM COATED ORAL at 17:32

## 2024-01-01 RX ADMIN — IRBESARTAN 300 MG: 150 TABLET ORAL at 21:11

## 2024-01-01 RX ADMIN — ISOSORBIDE MONONITRATE 60 MG: 60 TABLET, EXTENDED RELEASE ORAL at 21:10

## 2024-01-01 RX ADMIN — CARVEDILOL 6.25 MG: 6.25 TABLET, FILM COATED ORAL at 17:22

## 2024-01-01 RX ADMIN — CARVEDILOL 6.25 MG: 6.25 TABLET, FILM COATED ORAL at 08:15

## 2024-01-01 RX ADMIN — CEFTRIAXONE SODIUM 1 G: 1 INJECTION, POWDER, FOR SOLUTION INTRAMUSCULAR; INTRAVENOUS at 11:57

## 2024-01-01 RX ADMIN — CARVEDILOL 12.5 MG: 12.5 TABLET, FILM COATED ORAL at 08:40

## 2024-01-01 RX ADMIN — INSULIN ASPART 2 UNITS: 100 INJECTION, SOLUTION INTRAVENOUS; SUBCUTANEOUS at 17:28

## 2024-01-01 RX ADMIN — CEFTRIAXONE SODIUM 1 G: 1 INJECTION, POWDER, FOR SOLUTION INTRAMUSCULAR; INTRAVENOUS at 19:57

## 2024-01-01 RX ADMIN — ACETAMINOPHEN 1000 MG: 500 TABLET, FILM COATED ORAL at 13:05

## 2024-01-01 RX ADMIN — CARVEDILOL 6.25 MG: 6.25 TABLET, FILM COATED ORAL at 08:42

## 2024-01-01 RX ADMIN — TORSEMIDE 5 MG: 5 TABLET ORAL at 08:43

## 2024-01-01 RX ADMIN — AMLODIPINE BESYLATE 10 MG: 10 TABLET ORAL at 09:41

## 2024-01-01 RX ADMIN — CARVEDILOL 12.5 MG: 12.5 TABLET, FILM COATED ORAL at 16:09

## 2024-01-01 RX ADMIN — IRBESARTAN 300 MG: 150 TABLET ORAL at 21:23

## 2024-01-01 RX ADMIN — ACETAMINOPHEN 975 MG: 325 TABLET, FILM COATED ORAL at 08:44

## 2024-01-01 RX ADMIN — LIDOCAINE HYDROCHLORIDE 60 MG: 20 INJECTION, SOLUTION INFILTRATION; PERINEURAL at 07:54

## 2024-01-01 RX ADMIN — IRON SUCROSE 200 MG: 20 INJECTION, SOLUTION INTRAVENOUS at 13:13

## 2024-01-01 RX ADMIN — CEFAZOLIN 1 G: 1 INJECTION, POWDER, FOR SOLUTION INTRAMUSCULAR; INTRAVENOUS at 23:45

## 2024-01-01 RX ADMIN — ACETAMINOPHEN 1000 MG: 500 TABLET, FILM COATED ORAL at 14:10

## 2024-01-01 RX ADMIN — ACETAMINOPHEN 650 MG: 325 TABLET, FILM COATED ORAL at 08:59

## 2024-01-01 RX ADMIN — RAMELTEON 8 MG: 8 TABLET, FILM COATED ORAL at 21:16

## 2024-01-01 RX ADMIN — AMLODIPINE BESYLATE 10 MG: 10 TABLET ORAL at 07:57

## 2024-01-01 RX ADMIN — CARVEDILOL 6.25 MG: 6.25 TABLET, FILM COATED ORAL at 17:51

## 2024-01-01 RX ADMIN — Medication 1 SPRAY: at 20:09

## 2024-01-01 RX ADMIN — MIRTAZAPINE 15 MG: 15 TABLET, FILM COATED ORAL at 22:30

## 2024-01-01 RX ADMIN — ENOXAPARIN SODIUM 40 MG: 40 INJECTION SUBCUTANEOUS at 12:18

## 2024-01-01 RX ADMIN — ISOSORBIDE MONONITRATE 60 MG: 30 TABLET, EXTENDED RELEASE ORAL at 08:24

## 2024-01-01 RX ADMIN — ACETAMINOPHEN 650 MG: 325 TABLET, FILM COATED ORAL at 13:29

## 2024-01-01 RX ADMIN — Medication 1 SPRAY: at 08:09

## 2024-01-01 RX ADMIN — Medication 1 SPRAY: at 19:22

## 2024-01-01 RX ADMIN — TORSEMIDE 5 MG: 5 TABLET ORAL at 13:14

## 2024-01-01 RX ADMIN — INSULIN ASPART 3 UNITS: 100 INJECTION, SOLUTION INTRAVENOUS; SUBCUTANEOUS at 09:12

## 2024-01-01 RX ADMIN — ISOSORBIDE MONONITRATE 60 MG: 30 TABLET, EXTENDED RELEASE ORAL at 08:45

## 2024-01-01 RX ADMIN — LIDOCAINE 1 PATCH: 4 PATCH TOPICAL at 19:58

## 2024-01-01 RX ADMIN — POLYETHYLENE GLYCOL 3350 17 G: 17 POWDER, FOR SOLUTION ORAL at 08:16

## 2024-01-01 RX ADMIN — HUMAN INSULIN 2 UNITS: 100 INJECTION, SOLUTION SUBCUTANEOUS at 08:47

## 2024-01-01 RX ADMIN — TAMSULOSIN HYDROCHLORIDE 0.4 MG: 0.4 CAPSULE ORAL at 21:29

## 2024-01-01 RX ADMIN — ACETAMINOPHEN 650 MG: 325 TABLET, FILM COATED ORAL at 18:02

## 2024-01-01 RX ADMIN — MIRTAZAPINE 15 MG: 15 TABLET, FILM COATED ORAL at 21:16

## 2024-01-01 RX ADMIN — ISOSORBIDE MONONITRATE 60 MG: 60 TABLET, EXTENDED RELEASE ORAL at 20:38

## 2024-01-01 RX ADMIN — CARVEDILOL 6.25 MG: 6.25 TABLET, FILM COATED ORAL at 08:24

## 2024-01-01 RX ADMIN — HYDROMORPHONE HYDROCHLORIDE 0.2 MG: 0.2 INJECTION, SOLUTION INTRAMUSCULAR; INTRAVENOUS; SUBCUTANEOUS at 11:06

## 2024-01-01 RX ADMIN — CARVEDILOL 12.5 MG: 12.5 TABLET, FILM COATED ORAL at 08:07

## 2024-01-01 RX ADMIN — SENNOSIDES AND DOCUSATE SODIUM 1 TABLET: 50; 8.6 TABLET ORAL at 09:23

## 2024-01-01 RX ADMIN — INSULIN GLARGINE 6 UNITS: 100 INJECTION, SOLUTION SUBCUTANEOUS at 19:41

## 2024-01-01 RX ADMIN — AMLODIPINE BESYLATE 10 MG: 10 TABLET ORAL at 09:00

## 2024-01-01 RX ADMIN — DEXTROSE 15 G: 15 GEL ORAL at 23:21

## 2024-01-01 RX ADMIN — AMLODIPINE BESYLATE 10 MG: 10 TABLET ORAL at 08:07

## 2024-01-01 RX ADMIN — PHENYLEPHRINE HYDROCHLORIDE 100 MCG: 10 INJECTION INTRAVENOUS at 09:52

## 2024-01-01 RX ADMIN — POLYETHYLENE GLYCOL 3350 17 G: 17 POWDER, FOR SOLUTION ORAL at 08:56

## 2024-01-01 RX ADMIN — SENNOSIDES AND DOCUSATE SODIUM 1 TABLET: 50; 8.6 TABLET ORAL at 21:08

## 2024-01-01 RX ADMIN — APIXABAN 2.5 MG: 2.5 TABLET, FILM COATED ORAL at 20:31

## 2024-01-01 RX ADMIN — ACETAMINOPHEN 650 MG: 325 TABLET, FILM COATED ORAL at 16:16

## 2024-01-01 RX ADMIN — IRBESARTAN 300 MG: 150 TABLET ORAL at 22:46

## 2024-01-01 RX ADMIN — Medication 1 SPRAY: at 16:23

## 2024-01-01 RX ADMIN — LIDOCAINE 1 PATCH: 4 PATCH TOPICAL at 20:55

## 2024-01-01 RX ADMIN — ENOXAPARIN SODIUM 40 MG: 40 INJECTION SUBCUTANEOUS at 16:54

## 2024-01-01 RX ADMIN — ENOXAPARIN SODIUM 40 MG: 40 INJECTION SUBCUTANEOUS at 12:33

## 2024-01-01 RX ADMIN — AMLODIPINE BESYLATE 5 MG: 5 TABLET ORAL at 08:43

## 2024-01-01 RX ADMIN — ISOSORBIDE MONONITRATE 60 MG: 60 TABLET, EXTENDED RELEASE ORAL at 21:11

## 2024-01-01 RX ADMIN — ISOSORBIDE MONONITRATE 60 MG: 60 TABLET, EXTENDED RELEASE ORAL at 20:09

## 2024-01-01 RX ADMIN — INSULIN ASPART 7 UNITS: 100 INJECTION, SOLUTION INTRAVENOUS; SUBCUTANEOUS at 08:10

## 2024-01-01 RX ADMIN — ISOSORBIDE MONONITRATE 60 MG: 60 TABLET, EXTENDED RELEASE ORAL at 20:01

## 2024-01-01 RX ADMIN — ENOXAPARIN SODIUM 40 MG: 40 INJECTION SUBCUTANEOUS at 14:04

## 2024-01-01 RX ADMIN — INSULIN ASPART 2 UNITS: 100 INJECTION, SOLUTION INTRAVENOUS; SUBCUTANEOUS at 08:58

## 2024-01-01 RX ADMIN — PHENYLEPHRINE HYDROCHLORIDE 150 MCG: 10 INJECTION INTRAVENOUS at 09:37

## 2024-01-01 RX ADMIN — ASPIRIN 81 MG: 81 TABLET, COATED ORAL at 07:49

## 2024-01-01 RX ADMIN — OXYCODONE HYDROCHLORIDE 2.5 MG: 5 TABLET ORAL at 18:07

## 2024-01-01 RX ADMIN — ISOSORBIDE MONONITRATE 60 MG: 30 TABLET, EXTENDED RELEASE ORAL at 08:43

## 2024-01-01 RX ADMIN — INSULIN ASPART 4 UNITS: 100 INJECTION, SOLUTION INTRAVENOUS; SUBCUTANEOUS at 09:12

## 2024-01-01 RX ADMIN — HYDROMORPHONE HYDROCHLORIDE 0.2 MG: 0.2 INJECTION, SOLUTION INTRAMUSCULAR; INTRAVENOUS; SUBCUTANEOUS at 11:21

## 2024-01-01 RX ADMIN — MIRTAZAPINE 15 MG: 7.5 TABLET, FILM COATED ORAL at 21:05

## 2024-01-01 RX ADMIN — INSULIN ASPART 2 UNITS: 100 INJECTION, SOLUTION INTRAVENOUS; SUBCUTANEOUS at 18:51

## 2024-01-01 RX ADMIN — Medication 1 SPRAY: at 12:08

## 2024-01-01 RX ADMIN — ACETAMINOPHEN 975 MG: 325 TABLET, FILM COATED ORAL at 21:13

## 2024-01-01 RX ADMIN — ENOXAPARIN SODIUM 40 MG: 40 INJECTION SUBCUTANEOUS at 15:12

## 2024-01-01 RX ADMIN — RAMELTEON 8 MG: 8 TABLET, FILM COATED ORAL at 20:11

## 2024-01-01 RX ADMIN — SENNOSIDES AND DOCUSATE SODIUM 1 TABLET: 50; 8.6 TABLET ORAL at 09:10

## 2024-01-01 RX ADMIN — RAMELTEON 8 MG: 8 TABLET, FILM COATED ORAL at 21:05

## 2024-01-01 RX ADMIN — Medication 10 MG: at 21:10

## 2024-01-01 RX ADMIN — OXYCODONE HYDROCHLORIDE 2.5 MG: 5 TABLET ORAL at 21:07

## 2024-01-01 RX ADMIN — CARVEDILOL 12.5 MG: 12.5 TABLET, FILM COATED ORAL at 17:13

## 2024-01-01 RX ADMIN — Medication 1 SPRAY: at 13:20

## 2024-01-01 RX ADMIN — ENOXAPARIN SODIUM 40 MG: 40 INJECTION SUBCUTANEOUS at 12:08

## 2024-01-01 RX ADMIN — PHENYLEPHRINE HYDROCHLORIDE 100 MCG: 10 INJECTION INTRAVENOUS at 09:40

## 2024-01-01 RX ADMIN — CARVEDILOL 12.5 MG: 12.5 TABLET, FILM COATED ORAL at 08:13

## 2024-01-01 RX ADMIN — CARVEDILOL 12.5 MG: 12.5 TABLET, FILM COATED ORAL at 22:46

## 2024-01-01 RX ADMIN — INSULIN ASPART 3 UNITS: 100 INJECTION, SOLUTION INTRAVENOUS; SUBCUTANEOUS at 09:38

## 2024-01-01 RX ADMIN — TORSEMIDE 5 MG: 5 TABLET ORAL at 08:56

## 2024-01-01 RX ADMIN — ACETAMINOPHEN 650 MG: 325 TABLET, FILM COATED ORAL at 09:32

## 2024-01-01 RX ADMIN — ENOXAPARIN SODIUM 40 MG: 40 INJECTION SUBCUTANEOUS at 13:05

## 2024-01-01 RX ADMIN — CARVEDILOL 12.5 MG: 12.5 TABLET, FILM COATED ORAL at 17:27

## 2024-01-01 RX ADMIN — OXYCODONE HYDROCHLORIDE 2.5 MG: 5 TABLET ORAL at 00:46

## 2024-01-01 RX ADMIN — INSULIN GLARGINE 12 UNITS: 100 INJECTION, SOLUTION SUBCUTANEOUS at 22:48

## 2024-01-01 RX ADMIN — SENNOSIDES AND DOCUSATE SODIUM 1 TABLET: 50; 8.6 TABLET ORAL at 21:17

## 2024-01-01 RX ADMIN — ASPIRIN 81 MG: 81 TABLET, COATED ORAL at 12:48

## 2024-01-01 RX ADMIN — ACETAMINOPHEN 1000 MG: 500 TABLET, FILM COATED ORAL at 13:13

## 2024-01-01 RX ADMIN — CARVEDILOL 6.25 MG: 6.25 TABLET, FILM COATED ORAL at 09:00

## 2024-01-01 RX ADMIN — ISOSORBIDE MONONITRATE 60 MG: 30 TABLET, EXTENDED RELEASE ORAL at 09:22

## 2024-01-01 RX ADMIN — ACETAMINOPHEN 650 MG: 325 TABLET, FILM COATED ORAL at 21:22

## 2024-01-01 RX ADMIN — SODIUM CHLORIDE 1000 ML: 9 INJECTION, SOLUTION INTRAVENOUS at 14:16

## 2024-01-01 RX ADMIN — INSULIN ASPART 8 UNITS: 100 INJECTION, SOLUTION INTRAVENOUS; SUBCUTANEOUS at 08:23

## 2024-01-01 RX ADMIN — Medication 1 SPRAY: at 08:15

## 2024-01-01 RX ADMIN — ISOSORBIDE MONONITRATE 60 MG: 60 TABLET, EXTENDED RELEASE ORAL at 19:52

## 2024-01-01 RX ADMIN — Medication 1 SPRAY: at 11:35

## 2024-01-01 RX ADMIN — AMLODIPINE BESYLATE 5 MG: 5 TABLET ORAL at 08:57

## 2024-01-01 RX ADMIN — TAMSULOSIN HYDROCHLORIDE 0.4 MG: 0.4 CAPSULE ORAL at 09:24

## 2024-01-01 RX ADMIN — SODIUM CHLORIDE, POTASSIUM CHLORIDE, SODIUM LACTATE AND CALCIUM CHLORIDE: 600; 310; 30; 20 INJECTION, SOLUTION INTRAVENOUS at 14:11

## 2024-01-01 RX ADMIN — RAMELTEON 8 MG: 8 TABLET, FILM COATED ORAL at 20:34

## 2024-01-01 RX ADMIN — TAMSULOSIN HYDROCHLORIDE 0.4 MG: 0.4 CAPSULE ORAL at 09:33

## 2024-01-01 RX ADMIN — ISOSORBIDE MONONITRATE 60 MG: 60 TABLET, EXTENDED RELEASE ORAL at 20:06

## 2024-01-01 RX ADMIN — SENNOSIDES AND DOCUSATE SODIUM 1 TABLET: 50; 8.6 TABLET ORAL at 21:13

## 2024-01-01 RX ADMIN — IRBESARTAN 300 MG: 150 TABLET ORAL at 20:28

## 2024-01-01 RX ADMIN — ACETAMINOPHEN 650 MG: 325 TABLET, FILM COATED ORAL at 19:59

## 2024-01-01 RX ADMIN — TORSEMIDE 5 MG: 5 TABLET ORAL at 08:15

## 2024-01-01 RX ADMIN — Medication 1 SPRAY: at 19:37

## 2024-01-01 RX ADMIN — OXYCODONE HYDROCHLORIDE 5 MG: 5 TABLET ORAL at 06:28

## 2024-01-01 RX ADMIN — RAMELTEON 8 MG: 8 TABLET, FILM COATED ORAL at 20:27

## 2024-01-01 RX ADMIN — TAMSULOSIN HYDROCHLORIDE 0.4 MG: 0.4 CAPSULE ORAL at 21:15

## 2024-01-01 RX ADMIN — Medication 1 SPRAY: at 08:44

## 2024-01-01 RX ADMIN — TAMSULOSIN HYDROCHLORIDE 0.4 MG: 0.4 CAPSULE ORAL at 08:25

## 2024-01-01 RX ADMIN — Medication 1 SPRAY: at 12:56

## 2024-01-01 RX ADMIN — SODIUM CHLORIDE: 9 INJECTION, SOLUTION INTRAVENOUS at 21:15

## 2024-01-01 RX ADMIN — CARVEDILOL 6.25 MG: 6.25 TABLET, FILM COATED ORAL at 09:10

## 2024-01-01 RX ADMIN — ASPIRIN 81 MG: 81 TABLET, COATED ORAL at 08:43

## 2024-01-01 RX ADMIN — ONDANSETRON 4 MG: 2 INJECTION INTRAMUSCULAR; INTRAVENOUS at 08:34

## 2024-01-01 RX ADMIN — ISOSORBIDE MONONITRATE 60 MG: 60 TABLET, EXTENDED RELEASE ORAL at 20:32

## 2024-01-01 RX ADMIN — CARVEDILOL 6.25 MG: 6.25 TABLET, FILM COATED ORAL at 08:58

## 2024-01-01 RX ADMIN — ACETAMINOPHEN 975 MG: 325 TABLET, FILM COATED ORAL at 21:15

## 2024-01-01 RX ADMIN — INSULIN ASPART 3 UNITS: 100 INJECTION, SOLUTION INTRAVENOUS; SUBCUTANEOUS at 12:29

## 2024-01-01 RX ADMIN — Medication 1 SPRAY: at 21:35

## 2024-01-01 RX ADMIN — MIRTAZAPINE 7.5 MG: 7.5 TABLET, FILM COATED ORAL at 20:33

## 2024-01-01 RX ADMIN — OXYCODONE HYDROCHLORIDE 2.5 MG: 5 TABLET ORAL at 13:31

## 2024-01-01 RX ADMIN — ACETAMINOPHEN 650 MG: 325 TABLET, FILM COATED ORAL at 13:39

## 2024-01-01 RX ADMIN — INSULIN ASPART 4 UNITS: 100 INJECTION, SOLUTION INTRAVENOUS; SUBCUTANEOUS at 12:48

## 2024-01-01 RX ADMIN — Medication 1 SPRAY: at 21:03

## 2024-01-01 RX ADMIN — IRBESARTAN 300 MG: 150 TABLET ORAL at 20:06

## 2024-01-01 RX ADMIN — CARVEDILOL 6.25 MG: 6.25 TABLET, FILM COATED ORAL at 17:38

## 2024-01-01 RX ADMIN — ACETAMINOPHEN 975 MG: 325 TABLET, FILM COATED ORAL at 22:45

## 2024-01-01 RX ADMIN — POLYETHYLENE GLYCOL 3350 17 G: 17 POWDER, FOR SOLUTION ORAL at 08:59

## 2024-01-01 RX ADMIN — Medication 1 SPRAY: at 12:46

## 2024-01-01 RX ADMIN — TAMSULOSIN HYDROCHLORIDE 0.4 MG: 0.4 CAPSULE ORAL at 07:52

## 2024-01-01 RX ADMIN — INSULIN ASPART 5 UNITS: 100 INJECTION, SOLUTION INTRAVENOUS; SUBCUTANEOUS at 08:28

## 2024-01-01 RX ADMIN — MIRTAZAPINE 15 MG: 15 TABLET, FILM COATED ORAL at 00:33

## 2024-01-01 RX ADMIN — OXYCODONE HYDROCHLORIDE 2.5 MG: 5 TABLET ORAL at 07:54

## 2024-01-01 RX ADMIN — ISOSORBIDE MONONITRATE 60 MG: 60 TABLET, EXTENDED RELEASE ORAL at 08:44

## 2024-01-01 RX ADMIN — AMLODIPINE BESYLATE 10 MG: 10 TABLET ORAL at 08:13

## 2024-01-01 RX ADMIN — ACETAMINOPHEN 650 MG: 325 TABLET, FILM COATED ORAL at 13:04

## 2024-01-01 RX ADMIN — INSULIN ASPART 4 UNITS: 100 INJECTION, SOLUTION INTRAVENOUS; SUBCUTANEOUS at 09:08

## 2024-01-01 RX ADMIN — ACETAMINOPHEN 975 MG: 325 TABLET, FILM COATED ORAL at 05:48

## 2024-01-01 RX ADMIN — ACETAMINOPHEN 1000 MG: 500 TABLET, FILM COATED ORAL at 14:25

## 2024-01-01 RX ADMIN — TAMSULOSIN HYDROCHLORIDE 0.4 MG: 0.4 CAPSULE ORAL at 08:42

## 2024-01-01 RX ADMIN — HYDROMORPHONE HYDROCHLORIDE 0.2 MG: 0.2 INJECTION, SOLUTION INTRAMUSCULAR; INTRAVENOUS; SUBCUTANEOUS at 11:01

## 2024-01-01 RX ADMIN — MIRTAZAPINE 15 MG: 15 TABLET, FILM COATED ORAL at 22:46

## 2024-01-01 ASSESSMENT — ACTIVITIES OF DAILY LIVING (ADL)
ADLS_ACUITY_SCORE: 56
ADLS_ACUITY_SCORE: 42
ADLS_ACUITY_SCORE: 56
ADLS_ACUITY_SCORE: 41
ADLS_ACUITY_SCORE: 41
ADLS_ACUITY_SCORE: 43
ADLS_ACUITY_SCORE: 39
ADLS_ACUITY_SCORE: 56
ADLS_ACUITY_SCORE: 40
ADLS_ACUITY_SCORE: 37
ADLS_ACUITY_SCORE: 56
ADLS_ACUITY_SCORE: 52
ADLS_ACUITY_SCORE: 43
ADLS_ACUITY_SCORE: 38
ADLS_ACUITY_SCORE: 52
ADLS_ACUITY_SCORE: 56
ADLS_ACUITY_SCORE: 42
ADLS_ACUITY_SCORE: 41
ADLS_ACUITY_SCORE: 43
ADLS_ACUITY_SCORE: 42
ADLS_ACUITY_SCORE: 52
ADLS_ACUITY_SCORE: 38
ADLS_ACUITY_SCORE: 42
ADLS_ACUITY_SCORE: 42
ADLS_ACUITY_SCORE: 37
ADLS_ACUITY_SCORE: 42
ADLS_ACUITY_SCORE: 52
ADLS_ACUITY_SCORE: 42
ADLS_ACUITY_SCORE: 43
ADLS_ACUITY_SCORE: 41
ADLS_ACUITY_SCORE: 52
ADLS_ACUITY_SCORE: 42
ADLS_ACUITY_SCORE: 41
ADLS_ACUITY_SCORE: 38
ADLS_ACUITY_SCORE: 38
ADLS_ACUITY_SCORE: 37
ADLS_ACUITY_SCORE: 43
ADLS_ACUITY_SCORE: 56
ADLS_ACUITY_SCORE: 37
ADLS_ACUITY_SCORE: 52
ADLS_ACUITY_SCORE: 43
ADLS_ACUITY_SCORE: 27
ADLS_ACUITY_SCORE: 41
ADLS_ACUITY_SCORE: 41
ADLS_ACUITY_SCORE: 42
ADLS_ACUITY_SCORE: 43
ADLS_ACUITY_SCORE: 38
ADLS_ACUITY_SCORE: 42
ADLS_ACUITY_SCORE: 41
ADLS_ACUITY_SCORE: 38
ADLS_ACUITY_SCORE: 56
ADLS_ACUITY_SCORE: 42
ADLS_ACUITY_SCORE: 41
ADLS_ACUITY_SCORE: 38
ADLS_ACUITY_SCORE: 56
ADLS_ACUITY_SCORE: 43
ADLS_ACUITY_SCORE: 37
ADLS_ACUITY_SCORE: 41
ADLS_ACUITY_SCORE: 37
ADLS_ACUITY_SCORE: 56
ADLS_ACUITY_SCORE: 37
ADLS_ACUITY_SCORE: 41
ADLS_ACUITY_SCORE: 56
ADLS_ACUITY_SCORE: 42
ADLS_ACUITY_SCORE: 42
ADLS_ACUITY_SCORE: 43
ADLS_ACUITY_SCORE: 42
ADLS_ACUITY_SCORE: 52
ADLS_ACUITY_SCORE: 38
ADLS_ACUITY_SCORE: 41
ADLS_ACUITY_SCORE: 38
ADLS_ACUITY_SCORE: 41
ADLS_ACUITY_SCORE: 42
ADLS_ACUITY_SCORE: 44
ADLS_ACUITY_SCORE: 38
ADLS_ACUITY_SCORE: 43
ADLS_ACUITY_SCORE: 36
ADLS_ACUITY_SCORE: 52
ADLS_ACUITY_SCORE: 56
ADLS_ACUITY_SCORE: 42
ADLS_ACUITY_SCORE: 40
ADLS_ACUITY_SCORE: 39
ADLS_ACUITY_SCORE: 37
ADLS_ACUITY_SCORE: 56
ADLS_ACUITY_SCORE: 39
ADLS_ACUITY_SCORE: 38
ADLS_ACUITY_SCORE: 42
ADLS_ACUITY_SCORE: 34
ADLS_ACUITY_SCORE: 56
ADLS_ACUITY_SCORE: 39
ADLS_ACUITY_SCORE: 44
ADLS_ACUITY_SCORE: 37
ADLS_ACUITY_SCORE: 43
ADLS_ACUITY_SCORE: 41
ADLS_ACUITY_SCORE: 37
ADLS_ACUITY_SCORE: 36
ADLS_ACUITY_SCORE: 45
ADLS_ACUITY_SCORE: 38
ADLS_ACUITY_SCORE: 52
ADLS_ACUITY_SCORE: 38
ADLS_ACUITY_SCORE: 40
ADLS_ACUITY_SCORE: 42
ADLS_ACUITY_SCORE: 40
ADLS_ACUITY_SCORE: 41
ADLS_ACUITY_SCORE: 41
ADLS_ACUITY_SCORE: 42
ADLS_ACUITY_SCORE: 43
ADLS_ACUITY_SCORE: 42
ADLS_ACUITY_SCORE: 43
ADLS_ACUITY_SCORE: 42
ADLS_ACUITY_SCORE: 42
ADLS_ACUITY_SCORE: 56
ADLS_ACUITY_SCORE: 56
ADLS_ACUITY_SCORE: 43
ADLS_ACUITY_SCORE: 52
ADLS_ACUITY_SCORE: 36
ADLS_ACUITY_SCORE: 38
ADLS_ACUITY_SCORE: 41
ADLS_ACUITY_SCORE: 40
ADLS_ACUITY_SCORE: 42
ADLS_ACUITY_SCORE: 41
ADLS_ACUITY_SCORE: 41
ADLS_ACUITY_SCORE: 43
ADLS_ACUITY_SCORE: 37
ADLS_ACUITY_SCORE: 39
ADLS_ACUITY_SCORE: 43
ADLS_ACUITY_SCORE: 38
ADLS_ACUITY_SCORE: 42
ADLS_ACUITY_SCORE: 43
ADLS_ACUITY_SCORE: 38
ADLS_ACUITY_SCORE: 42
ADLS_ACUITY_SCORE: 37
ADLS_ACUITY_SCORE: 52
ADLS_ACUITY_SCORE: 42
ADLS_ACUITY_SCORE: 38
ADLS_ACUITY_SCORE: 56
ADLS_ACUITY_SCORE: 42
ADLS_ACUITY_SCORE: 56
ADLS_ACUITY_SCORE: 56
ADLS_ACUITY_SCORE: 43
ADLS_ACUITY_SCORE: 56
ADLS_ACUITY_SCORE: 38
ADLS_ACUITY_SCORE: 41
ADLS_ACUITY_SCORE: 56
ADLS_ACUITY_SCORE: 42
ADLS_ACUITY_SCORE: 45
ADLS_ACUITY_SCORE: 44
ADLS_ACUITY_SCORE: 42
ADLS_ACUITY_SCORE: 42
ADLS_ACUITY_SCORE: 38
ADLS_ACUITY_SCORE: 42
ADLS_ACUITY_SCORE: 42
ADLS_ACUITY_SCORE: 40
ADLS_ACUITY_SCORE: 37
ADLS_ACUITY_SCORE: 43
ADLS_ACUITY_SCORE: 36
ADLS_ACUITY_SCORE: 34
ADLS_ACUITY_SCORE: 40
ADLS_ACUITY_SCORE: 42
ADLS_ACUITY_SCORE: 42
ADLS_ACUITY_SCORE: 38
ADLS_ACUITY_SCORE: 36
ADLS_ACUITY_SCORE: 46
ADLS_ACUITY_SCORE: 41
ADLS_ACUITY_SCORE: 38
ADLS_ACUITY_SCORE: 47
ADLS_ACUITY_SCORE: 52
ADLS_ACUITY_SCORE: 40
ADLS_ACUITY_SCORE: 43
ADLS_ACUITY_SCORE: 56
ADLS_ACUITY_SCORE: 42
ADLS_ACUITY_SCORE: 43
ADLS_ACUITY_SCORE: 52
ADLS_ACUITY_SCORE: 56
ADLS_ACUITY_SCORE: 52
ADLS_ACUITY_SCORE: 52
ADLS_ACUITY_SCORE: 42
ADLS_ACUITY_SCORE: 41
ADLS_ACUITY_SCORE: 52
ADLS_ACUITY_SCORE: 41
ADLS_ACUITY_SCORE: 41
ADLS_ACUITY_SCORE: 38
ADLS_ACUITY_SCORE: 42
ADLS_ACUITY_SCORE: 42
ADLS_ACUITY_SCORE: 56
ADLS_ACUITY_SCORE: 43
ADLS_ACUITY_SCORE: 43
ADLS_ACUITY_SCORE: 56
ADLS_ACUITY_SCORE: 27
ADLS_ACUITY_SCORE: 39
ADLS_ACUITY_SCORE: 41
ADLS_ACUITY_SCORE: 42
ADLS_ACUITY_SCORE: 44
ADLS_ACUITY_SCORE: 56
ADLS_ACUITY_SCORE: 43
ADLS_ACUITY_SCORE: 43
ADLS_ACUITY_SCORE: 42
ADLS_ACUITY_SCORE: 43
ADLS_ACUITY_SCORE: 37
ADLS_ACUITY_SCORE: 56
ADLS_ACUITY_SCORE: 42
ADLS_ACUITY_SCORE: 37
ADLS_ACUITY_SCORE: 56
ADLS_ACUITY_SCORE: 43
ADLS_ACUITY_SCORE: 41
ADLS_ACUITY_SCORE: 36
ADLS_ACUITY_SCORE: 42
ADLS_ACUITY_SCORE: 41
ADLS_ACUITY_SCORE: 42
ADLS_ACUITY_SCORE: 38
ADLS_ACUITY_SCORE: 38
ADLS_ACUITY_SCORE: 40
ADLS_ACUITY_SCORE: 41
ADLS_ACUITY_SCORE: 41
ADLS_ACUITY_SCORE: 42
ADLS_ACUITY_SCORE: 52
ADLS_ACUITY_SCORE: 42
ADLS_ACUITY_SCORE: 38
ADLS_ACUITY_SCORE: 42
ADLS_ACUITY_SCORE: 38
ADLS_ACUITY_SCORE: 56
ADLS_ACUITY_SCORE: 43
ADLS_ACUITY_SCORE: 43
ADLS_ACUITY_SCORE: 42
ADLS_ACUITY_SCORE: 56
ADLS_ACUITY_SCORE: 42
ADLS_ACUITY_SCORE: 37
DEPENDENT_IADLS:: MEDICATION MANAGEMENT;CLEANING
ADLS_ACUITY_SCORE: 41
ADLS_ACUITY_SCORE: 41
ADLS_ACUITY_SCORE: 42
ADLS_ACUITY_SCORE: 39
ADLS_ACUITY_SCORE: 38
ADLS_ACUITY_SCORE: 42
ADLS_ACUITY_SCORE: 42
ADLS_ACUITY_SCORE: 38
ADLS_ACUITY_SCORE: 38
ADLS_ACUITY_SCORE: 36
ADLS_ACUITY_SCORE: 45
ADLS_ACUITY_SCORE: 47
ADLS_ACUITY_SCORE: 43
ADLS_ACUITY_SCORE: 41
ADLS_ACUITY_SCORE: 56
ADLS_ACUITY_SCORE: 36
ADLS_ACUITY_SCORE: 27
ADLS_ACUITY_SCORE: 41
ADLS_ACUITY_SCORE: 41
ADLS_ACUITY_SCORE: 52
ADLS_ACUITY_SCORE: 56
ADLS_ACUITY_SCORE: 39
ADLS_ACUITY_SCORE: 56
ADLS_ACUITY_SCORE: 44
ADLS_ACUITY_SCORE: 42
ADLS_ACUITY_SCORE: 38
ADLS_ACUITY_SCORE: 36
ADLS_ACUITY_SCORE: 34
ADLS_ACUITY_SCORE: 42
ADLS_ACUITY_SCORE: 41
ADLS_ACUITY_SCORE: 36
ADLS_ACUITY_SCORE: 38
ADLS_ACUITY_SCORE: 43
ADLS_ACUITY_SCORE: 42
ADLS_ACUITY_SCORE: 42
ADLS_ACUITY_SCORE: 43
ADLS_ACUITY_SCORE: 39
ADLS_ACUITY_SCORE: 42
ADLS_ACUITY_SCORE: 56
ADLS_ACUITY_SCORE: 52
ADLS_ACUITY_SCORE: 41
ADLS_ACUITY_SCORE: 36
ADLS_ACUITY_SCORE: 38
ADLS_ACUITY_SCORE: 42
ADLS_ACUITY_SCORE: 41
ADLS_ACUITY_SCORE: 56
ADLS_ACUITY_SCORE: 42
ADLS_ACUITY_SCORE: 37
ADLS_ACUITY_SCORE: 41
ADLS_ACUITY_SCORE: 56
ADLS_ACUITY_SCORE: 36
ADLS_ACUITY_SCORE: 38
ADLS_ACUITY_SCORE: 41
ADLS_ACUITY_SCORE: 38
ADLS_ACUITY_SCORE: 41
ADLS_ACUITY_SCORE: 43
ADLS_ACUITY_SCORE: 27
ADLS_ACUITY_SCORE: 56
ADLS_ACUITY_SCORE: 42
ADLS_ACUITY_SCORE: 34
ADLS_ACUITY_SCORE: 41
ADLS_ACUITY_SCORE: 41
ADLS_ACUITY_SCORE: 43
ADLS_ACUITY_SCORE: 43
ADLS_ACUITY_SCORE: 41
ADLS_ACUITY_SCORE: 42
ADLS_ACUITY_SCORE: 37
ADLS_ACUITY_SCORE: 36
ADLS_ACUITY_SCORE: 42
ADLS_ACUITY_SCORE: 37
ADLS_ACUITY_SCORE: 34
ADLS_ACUITY_SCORE: 37
ADLS_ACUITY_SCORE: 43
ADLS_ACUITY_SCORE: 37
ADLS_ACUITY_SCORE: 42
ADLS_ACUITY_SCORE: 56
ADLS_ACUITY_SCORE: 38
ADLS_ACUITY_SCORE: 43
ADLS_ACUITY_SCORE: 43
ADLS_ACUITY_SCORE: 38
ADLS_ACUITY_SCORE: 37
ADLS_ACUITY_SCORE: 41
ADLS_ACUITY_SCORE: 42
ADLS_ACUITY_SCORE: 43
ADLS_ACUITY_SCORE: 38
ADLS_ACUITY_SCORE: 41
ADLS_ACUITY_SCORE: 43
ADLS_ACUITY_SCORE: 41
ADLS_ACUITY_SCORE: 43
ADLS_ACUITY_SCORE: 42
ADLS_ACUITY_SCORE: 41
ADLS_ACUITY_SCORE: 37
ADLS_ACUITY_SCORE: 56
ADLS_ACUITY_SCORE: 41
ADLS_ACUITY_SCORE: 43
ADLS_ACUITY_SCORE: 36
ADLS_ACUITY_SCORE: 37
ADLS_ACUITY_SCORE: 43
ADLS_ACUITY_SCORE: 38
ADLS_ACUITY_SCORE: 52
ADLS_ACUITY_SCORE: 52
ADLS_ACUITY_SCORE: 42
ADLS_ACUITY_SCORE: 44
ADLS_ACUITY_SCORE: 56
ADLS_ACUITY_SCORE: 36
ADLS_ACUITY_SCORE: 38
DEPENDENT_IADLS:: MEDICATION MANAGEMENT;CLEANING
ADLS_ACUITY_SCORE: 42
ADLS_ACUITY_SCORE: 40
ADLS_ACUITY_SCORE: 38
ADLS_ACUITY_SCORE: 56
ADLS_ACUITY_SCORE: 42
ADLS_ACUITY_SCORE: 42
ADLS_ACUITY_SCORE: 43
ADLS_ACUITY_SCORE: 38
ADLS_ACUITY_SCORE: 36
ADLS_ACUITY_SCORE: 56
ADLS_ACUITY_SCORE: 38
ADLS_ACUITY_SCORE: 36
ADLS_ACUITY_SCORE: 42
ADLS_ACUITY_SCORE: 37
ADLS_ACUITY_SCORE: 36
ADLS_ACUITY_SCORE: 41
ADLS_ACUITY_SCORE: 52
ADLS_ACUITY_SCORE: 38
ADLS_ACUITY_SCORE: 39
ADLS_ACUITY_SCORE: 42
ADLS_ACUITY_SCORE: 52
ADLS_ACUITY_SCORE: 38
ADLS_ACUITY_SCORE: 56
ADLS_ACUITY_SCORE: 44
ADLS_ACUITY_SCORE: 41
ADLS_ACUITY_SCORE: 56
ADLS_ACUITY_SCORE: 41
ADLS_ACUITY_SCORE: 40
ADLS_ACUITY_SCORE: 42
ADLS_ACUITY_SCORE: 42
ADLS_ACUITY_SCORE: 52
ADLS_ACUITY_SCORE: 41
ADLS_ACUITY_SCORE: 36
ADLS_ACUITY_SCORE: 43
ADLS_ACUITY_SCORE: 42
ADLS_ACUITY_SCORE: 38
ADLS_ACUITY_SCORE: 41
ADLS_ACUITY_SCORE: 56
ADLS_ACUITY_SCORE: 36
ADLS_ACUITY_SCORE: 43
ADLS_ACUITY_SCORE: 43
ADLS_ACUITY_SCORE: 41
ADLS_ACUITY_SCORE: 38
ADLS_ACUITY_SCORE: 37
ADLS_ACUITY_SCORE: 56
ADLS_ACUITY_SCORE: 56
ADLS_ACUITY_SCORE: 43
ADLS_ACUITY_SCORE: 52
ADLS_ACUITY_SCORE: 56
ADLS_ACUITY_SCORE: 56
ADLS_ACUITY_SCORE: 34
ADLS_ACUITY_SCORE: 42
ADLS_ACUITY_SCORE: 38
ADLS_ACUITY_SCORE: 40
ADLS_ACUITY_SCORE: 42
ADLS_ACUITY_SCORE: 37
ADLS_ACUITY_SCORE: 42
ADLS_ACUITY_SCORE: 37
ADLS_ACUITY_SCORE: 36
ADLS_ACUITY_SCORE: 42
ADLS_ACUITY_SCORE: 38
ADLS_ACUITY_SCORE: 41
ADLS_ACUITY_SCORE: 44
ADLS_ACUITY_SCORE: 41
ADLS_ACUITY_SCORE: 56
ADLS_ACUITY_SCORE: 37
ADLS_ACUITY_SCORE: 42
ADLS_ACUITY_SCORE: 37
ADLS_ACUITY_SCORE: 43
ADLS_ACUITY_SCORE: 56
ADLS_ACUITY_SCORE: 43
ADLS_ACUITY_SCORE: 40
ADLS_ACUITY_SCORE: 42
ADLS_ACUITY_SCORE: 52
ADLS_ACUITY_SCORE: 39
ADLS_ACUITY_SCORE: 34
ADLS_ACUITY_SCORE: 56
ADLS_ACUITY_SCORE: 38
ADLS_ACUITY_SCORE: 41
ADLS_ACUITY_SCORE: 42
ADLS_ACUITY_SCORE: 56
ADLS_ACUITY_SCORE: 42
ADLS_ACUITY_SCORE: 37
ADLS_ACUITY_SCORE: 38
ADLS_ACUITY_SCORE: 41
ADLS_ACUITY_SCORE: 38
ADLS_ACUITY_SCORE: 29
ADLS_ACUITY_SCORE: 36
ADLS_ACUITY_SCORE: 38
ADLS_ACUITY_SCORE: 43
ADLS_ACUITY_SCORE: 37
ADLS_ACUITY_SCORE: 42
ADLS_ACUITY_SCORE: 38
ADLS_ACUITY_SCORE: 38
ADLS_ACUITY_SCORE: 42
ADLS_ACUITY_SCORE: 42
ADLS_ACUITY_SCORE: 38
ADLS_ACUITY_SCORE: 56
ADLS_ACUITY_SCORE: 56
ADLS_ACUITY_SCORE: 46
ADLS_ACUITY_SCORE: 52
ADLS_ACUITY_SCORE: 38
ADLS_ACUITY_SCORE: 38
ADLS_ACUITY_SCORE: 42
ADLS_ACUITY_SCORE: 42
ADLS_ACUITY_SCORE: 56
ADLS_ACUITY_SCORE: 42
ADLS_ACUITY_SCORE: 38
ADLS_ACUITY_SCORE: 56
ADLS_ACUITY_SCORE: 41
ADLS_ACUITY_SCORE: 42
ADLS_ACUITY_SCORE: 41
ADLS_ACUITY_SCORE: 43
ADLS_ACUITY_SCORE: 42
ADLS_ACUITY_SCORE: 38
ADLS_ACUITY_SCORE: 56
ADLS_ACUITY_SCORE: 37
ADLS_ACUITY_SCORE: 38
ADLS_ACUITY_SCORE: 36
ADLS_ACUITY_SCORE: 56
ADLS_ACUITY_SCORE: 44
ADLS_ACUITY_SCORE: 36
ADLS_ACUITY_SCORE: 44
ADLS_ACUITY_SCORE: 42
ADLS_ACUITY_SCORE: 42
ADLS_ACUITY_SCORE: 41
ADLS_ACUITY_SCORE: 36
ADLS_ACUITY_SCORE: 43
ADLS_ACUITY_SCORE: 56
ADLS_ACUITY_SCORE: 41
ADLS_ACUITY_SCORE: 38
ADLS_ACUITY_SCORE: 41
ADLS_ACUITY_SCORE: 42
ADLS_ACUITY_SCORE: 56
ADLS_ACUITY_SCORE: 42
ADLS_ACUITY_SCORE: 52
ADLS_ACUITY_SCORE: 52
ADLS_ACUITY_SCORE: 36
ADLS_ACUITY_SCORE: 56
ADLS_ACUITY_SCORE: 27
ADLS_ACUITY_SCORE: 56
ADLS_ACUITY_SCORE: 42
ADLS_ACUITY_SCORE: 43
ADLS_ACUITY_SCORE: 43
ADLS_ACUITY_SCORE: 52
ADLS_ACUITY_SCORE: 37
ADLS_ACUITY_SCORE: 41
ADLS_ACUITY_SCORE: 38
ADLS_ACUITY_SCORE: 39
ADLS_ACUITY_SCORE: 34
ADLS_ACUITY_SCORE: 37
ADLS_ACUITY_SCORE: 56
ADLS_ACUITY_SCORE: 42
ADLS_ACUITY_SCORE: 36
ADLS_ACUITY_SCORE: 37
ADLS_ACUITY_SCORE: 38
ADLS_ACUITY_SCORE: 38
ADLS_ACUITY_SCORE: 36
ADLS_ACUITY_SCORE: 37
ADLS_ACUITY_SCORE: 42
ADLS_ACUITY_SCORE: 40
ADLS_ACUITY_SCORE: 56
ADLS_ACUITY_SCORE: 43
ADLS_ACUITY_SCORE: 42
ADLS_ACUITY_SCORE: 41
ADLS_ACUITY_SCORE: 40
ADLS_ACUITY_SCORE: 44
ADLS_ACUITY_SCORE: 42
ADLS_ACUITY_SCORE: 38
ADLS_ACUITY_SCORE: 36
ADLS_ACUITY_SCORE: 38
ADLS_ACUITY_SCORE: 36
ADLS_ACUITY_SCORE: 41
ADLS_ACUITY_SCORE: 52
ADLS_ACUITY_SCORE: 44
ADLS_ACUITY_SCORE: 43
ADLS_ACUITY_SCORE: 56
ADLS_ACUITY_SCORE: 42
ADLS_ACUITY_SCORE: 42
ADLS_ACUITY_SCORE: 39
ADLS_ACUITY_SCORE: 42
ADLS_ACUITY_SCORE: 56
ADLS_ACUITY_SCORE: 56
ADLS_ACUITY_SCORE: 52
ADLS_ACUITY_SCORE: 38
ADLS_ACUITY_SCORE: 38
ADLS_ACUITY_SCORE: 37
ADLS_ACUITY_SCORE: 38
ADLS_ACUITY_SCORE: 43
ADLS_ACUITY_SCORE: 56
ADLS_ACUITY_SCORE: 56
ADLS_ACUITY_SCORE: 37
ADLS_ACUITY_SCORE: 42
ADLS_ACUITY_SCORE: 37
ADLS_ACUITY_SCORE: 37
ADLS_ACUITY_SCORE: 43
ADLS_ACUITY_SCORE: 56
ADLS_ACUITY_SCORE: 42
ADLS_ACUITY_SCORE: 42
ADLS_ACUITY_SCORE: 34
ADLS_ACUITY_SCORE: 56
ADLS_ACUITY_SCORE: 38
ADLS_ACUITY_SCORE: 36
ADLS_ACUITY_SCORE: 41
ADLS_ACUITY_SCORE: 34
ADLS_ACUITY_SCORE: 42
ADLS_ACUITY_SCORE: 46
ADLS_ACUITY_SCORE: 37
ADLS_ACUITY_SCORE: 52
ADLS_ACUITY_SCORE: 43
ADLS_ACUITY_SCORE: 38
ADLS_ACUITY_SCORE: 34
ADLS_ACUITY_SCORE: 36
ADLS_ACUITY_SCORE: 42
ADLS_ACUITY_SCORE: 41
ADLS_ACUITY_SCORE: 42
ADLS_ACUITY_SCORE: 34
ADLS_ACUITY_SCORE: 42
ADLS_ACUITY_SCORE: 38
ADLS_ACUITY_SCORE: 43
ADLS_ACUITY_SCORE: 37
ADLS_ACUITY_SCORE: 39
ADLS_ACUITY_SCORE: 42
ADLS_ACUITY_SCORE: 38
ADLS_ACUITY_SCORE: 41
ADLS_ACUITY_SCORE: 41
ADLS_ACUITY_SCORE: 38
ADLS_ACUITY_SCORE: 42
ADLS_ACUITY_SCORE: 38
ADLS_ACUITY_SCORE: 38
ADLS_ACUITY_SCORE: 42
ADLS_ACUITY_SCORE: 42
ADLS_ACUITY_SCORE: 38
ADLS_ACUITY_SCORE: 37
ADLS_ACUITY_SCORE: 42
ADLS_ACUITY_SCORE: 42
ADLS_ACUITY_SCORE: 43
ADLS_ACUITY_SCORE: 56
ADLS_ACUITY_SCORE: 38
ADLS_ACUITY_SCORE: 43
ADLS_ACUITY_SCORE: 42
ADLS_ACUITY_SCORE: 38
ADLS_ACUITY_SCORE: 38
ADLS_ACUITY_SCORE: 36
ADLS_ACUITY_SCORE: 42
ADLS_ACUITY_SCORE: 52
ADLS_ACUITY_SCORE: 34
ADLS_ACUITY_SCORE: 43
ADLS_ACUITY_SCORE: 41
ADLS_ACUITY_SCORE: 41
ADLS_ACUITY_SCORE: 42
ADLS_ACUITY_SCORE: 42
ADLS_ACUITY_SCORE: 38
ADLS_ACUITY_SCORE: 41
ADLS_ACUITY_SCORE: 40
ADLS_ACUITY_SCORE: 42
ADLS_ACUITY_SCORE: 52
ADLS_ACUITY_SCORE: 42
ADLS_ACUITY_SCORE: 46
ADLS_ACUITY_SCORE: 42
ADLS_ACUITY_SCORE: 40
ADLS_ACUITY_SCORE: 42
ADLS_ACUITY_SCORE: 36
ADLS_ACUITY_SCORE: 42
ADLS_ACUITY_SCORE: 37
ADLS_ACUITY_SCORE: 39
ADLS_ACUITY_SCORE: 52
ADLS_ACUITY_SCORE: 38
ADLS_ACUITY_SCORE: 52
ADLS_ACUITY_SCORE: 56
ADLS_ACUITY_SCORE: 38
ADLS_ACUITY_SCORE: 56
ADLS_ACUITY_SCORE: 36
ADLS_ACUITY_SCORE: 37
ADLS_ACUITY_SCORE: 52
ADLS_ACUITY_SCORE: 44
ADLS_ACUITY_SCORE: 42
ADLS_ACUITY_SCORE: 38
ADLS_ACUITY_SCORE: 56
ADLS_ACUITY_SCORE: 42
ADLS_ACUITY_SCORE: 52
ADLS_ACUITY_SCORE: 26
ADLS_ACUITY_SCORE: 38
ADLS_ACUITY_SCORE: 39
ADLS_ACUITY_SCORE: 39
ADLS_ACUITY_SCORE: 56
ADLS_ACUITY_SCORE: 52
ADLS_ACUITY_SCORE: 42
ADLS_ACUITY_SCORE: 41
ADLS_ACUITY_SCORE: 41
ADLS_ACUITY_SCORE: 37
ADLS_ACUITY_SCORE: 43
ADLS_ACUITY_SCORE: 41
ADLS_ACUITY_SCORE: 36
ADLS_ACUITY_SCORE: 39
ADLS_ACUITY_SCORE: 43
ADLS_ACUITY_SCORE: 47
ADLS_ACUITY_SCORE: 43
ADLS_ACUITY_SCORE: 37
ADLS_ACUITY_SCORE: 38
ADLS_ACUITY_SCORE: 42
ADLS_ACUITY_SCORE: 56
ADLS_ACUITY_SCORE: 37
ADLS_ACUITY_SCORE: 36
ADLS_ACUITY_SCORE: 38
ADLS_ACUITY_SCORE: 38
ADLS_ACUITY_SCORE: 42
ADLS_ACUITY_SCORE: 43
ADLS_ACUITY_SCORE: 43
ADLS_ACUITY_SCORE: 42
ADLS_ACUITY_SCORE: 36
ADLS_ACUITY_SCORE: 52
ADLS_ACUITY_SCORE: 41
ADLS_ACUITY_SCORE: 38
ADLS_ACUITY_SCORE: 37
ADLS_ACUITY_SCORE: 42
ADLS_ACUITY_SCORE: 41
ADLS_ACUITY_SCORE: 44
ADLS_ACUITY_SCORE: 38
ADLS_ACUITY_SCORE: 41
ADLS_ACUITY_SCORE: 42
ADLS_ACUITY_SCORE: 46
ADLS_ACUITY_SCORE: 37
ADLS_ACUITY_SCORE: 39
ADLS_ACUITY_SCORE: 52
ADLS_ACUITY_SCORE: 41
ADLS_ACUITY_SCORE: 41
ADLS_ACUITY_SCORE: 34
ADLS_ACUITY_SCORE: 56
ADLS_ACUITY_SCORE: 41
ADLS_ACUITY_SCORE: 56
ADLS_ACUITY_SCORE: 42
ADLS_ACUITY_SCORE: 34
ADLS_ACUITY_SCORE: 38
ADLS_ACUITY_SCORE: 56
ADLS_ACUITY_SCORE: 43
ADLS_ACUITY_SCORE: 56
ADLS_ACUITY_SCORE: 42
ADLS_ACUITY_SCORE: 39
ADLS_ACUITY_SCORE: 42
ADLS_ACUITY_SCORE: 42
ADLS_ACUITY_SCORE: 38
ADLS_ACUITY_SCORE: 41
ADLS_ACUITY_SCORE: 38
ADLS_ACUITY_SCORE: 41
ADLS_ACUITY_SCORE: 42
ADLS_ACUITY_SCORE: 52
ADLS_ACUITY_SCORE: 42
ADLS_ACUITY_SCORE: 36
ADLS_ACUITY_SCORE: 38
ADLS_ACUITY_SCORE: 42

## 2024-01-01 ASSESSMENT — ENCOUNTER SYMPTOMS: DYSRHYTHMIAS: 1

## 2024-01-01 ASSESSMENT — COLUMBIA-SUICIDE SEVERITY RATING SCALE - C-SSRS

## 2024-01-01 ASSESSMENT — COPD QUESTIONNAIRES: COPD: 1

## 2024-01-01 NOTE — PLAN OF CARE
Discharge Planner Post-Acute Rehab PT:     Discharge Plan: home with home care vs OP PT TBA; 3 steps with rail R to enter his home (has stair lifts inside).  He cares for his wife who has dementia; she is able to assist physically if needed per pt.     Precautions: fall, alarms    Current Status:  Bed Mobility: cga  Transfer: cga to min A FWW  Gait: cga to min A with FWW 50'  Stairs: TBA   Balance: cga to min A with FWW formal assessment TBA    Outcome Measures:   ASKEW on Jan 1 - 20/56    Assessment: Overall, session tolerated very well. Pt shows improvement in tolerance to tasks today, per nursing improving blood sugar control. He is motiviated to make progress and would benefit from review of HEP for in room supine and sitting.       Other Barriers to Discharge (DME, Family Training, etc):   Fall risk, neuro involvement;  caregiver role for his spouse.

## 2024-01-01 NOTE — PROGRESS NOTES
[facility]    Medicine Progress Note - Hospitalist Service    Date of Admission:  12/29/2023    Assessment & Plan   Tc Garcia is a 83 year old male with a history of paroxysmal atrial fibrillation on Eliquis, pulmonary hypertension, diabetes mellitus type I, who was initially admitted to [facility] 12/11/2023-12/27 for intracranial hemorrhage after fall. Patient was transferred to [facility] ARU for further rehabilitation, but was noted to have significant hyperglycemia in the setting of likely pump failure, thus was sent back to [facility] 12/28- 12/29. Endocrinology was involved and assisted with insulin regimen. Patient stable for transfer back to the ARU 12/29. Medicine was consulted for assistance with blood sugar management, endo also following.      Hyperglycemia, improving  Ketosis, resolved  Diabetes Mellitus Type I  Insulin pump initially held at [facility] due to intraparenchymal hematoma.  Was restarted on 12/23/2023 but was found to be in DKA the following day.  DKA did resolve by 12/25/2023.  He was transitioned to Lantus during his hospitalization, and then discharged to acute rehab on his insulin pump. Arrived to ARU hyperglycemic and subsequently transferred to the ED. Found to have ketones, but no evidence of DKA. Etiology of hyperglycemia was likely pump failure due to malpositioning of pump. Endocrinology was consulted and restarted subcutaneous insulin regimen. Patient was transferred back to the ARU and endocrinology planning on following closely and likely will restart insulin pump 1/2.   - Endocrinology following  - Insulin regimen for now until pump restarted  - Lantus 10 units every morning  - Carb coverage of 1 per 7 grams  - Medium intensity correction scale      Intraparenchymal hematoma   H/o spontaneous intracranial hemorrhage (1/2023)  paroxysmal atrial fibrillation (on eliquis prior to admission)  Presented with dizziness diplopia and  nausea to [facility] on 12/11. Found to have intraparenchymal hematoma on imaging. Etiology for intraparenchymal hematoma likely hypertensive and in the setting of chronic anticoagulation with Eliquis versus traumatic after sudden onset of dizziness and fall. Anticoagulation was reversed with Kcentra in ED and he was admitted to the ICU for close monitoring. Neurosurgery was consulted, but patient did not wish for any surgical intervention, thus signed off.   - Follow up with cardiology in one month  - Follow up with stroke neurology in 6-8 weeks      UTI due to Klebsiella pneumoniae  Possible aspiration pneumonia.  Noted to have agitation and confusion on 12/12 &12/13. Work-up with leukocytosis and concern for UTI on UA and aspiration pneumonia on CXR. Urine culture positive for Klebsiella pneumonia. Start on ceftriaxone, subsequently switched to cefuroxime to complete course. Noted to have lactic acidosis again on 12/24, restarted on broad spectrum antibiotics and eventually discharged on IV ceftriaxone (will complete 12/31).  - Continue IV ceftriaxone for now (complete 12/31).     Chronic diastolic CHF  Atrial fibrillation PTA on Eliquis.  Hypertension.  Hyperlipidemia.  Prior to admission Eliquis on hold since admission to [facility]. Most recent echocardiogram with EF of 60 to 65%.  Severe biatrial enlargement.  Mild to moderate TR. Telemetry  with atrial fibrillation.  - Continue PTA carvedilol  - Continue Avapro   - Hold PTA Demadex, cardiology recently advised changing to as needed dosing  - Follow-up with cardiology after discharge, discuss watchman procedure      Stable/Resolved/Chronic Medical Conditions:  CKD stage III Baseline creatinine ~1.5. Avoid nephrotoxins.  Chronic C7 compression fracture Seen by neurosurgery on 12/12 who stated that fracture likely healed and given that x-ray showed no dynamic instability, no surgical intervention or C collar needed.          Diet: Room  Service  Combination Diet Moderate Consistent Carb (60 g CHO per Meal) Diet; Regular Diet; Thin Liquids (level 0)    DVT Prophylaxis: Enoxaparin (Lovenox) SQ  Pruett Catheter: Not present  Lines: None     Cardiac Monitoring: None  Code Status: No CPR- Do NOT Intubate      Clinically Significant Risk Factors              # Hypoalbuminemia: Lowest albumin = 3.4 g/dL at 12/29/2023  5:33 AM, will monitor as appropriate     # Hypertension: Noted on problem list  # Chronic heart failure with preserved ejection fraction: heart failure noted on problem list and last echo with EF >50%      # DMII: A1C = 8.1 % (Ref range: <5.7 %) within past 6 months       # Financial/Environmental Concerns:           Disposition Plan           The patient's care was discussed with the Bedside Nurse and Patient.    Ella Ibarra PA-C  Hospitalist Service  [facility]  Securely message with [employer] (more info)  Text page via [employer] Paging/Directory   ______________________________________________________________________    Interval History   All overnight events reviewed.  Patient states he's working well with physical therapy and feels he is progressive.  He has not concerns or complaints at this time.     Physical Exam   Vital Signs: Temp: 97.7  F (36.5  C) Temp src: Oral BP: 105/61 Pulse: 58   Resp: 16 SpO2: 98 % O2 Device: Nasal cannula Oxygen Delivery: 2 LPM  Weight: 141 lbs 0 oz  GENERAL: Alert and oriented x 3. NAD. Ambulatory. Cooperative.   HEENT: Anicteric sclera. Mucous membranes moist. NC. AT.   CV: RRR. S1, S2. No murmurs appreciated.   RESPIRATORY: Effort normal on RA. Lungs CTAB with no wheezing, rales, rhonchi.   GI: Abdomen soft and non distended with normoactive bowel sounds present in all quadrants. No tenderness  NEUROLOGICAL: No focal deficits. Moves all extremities.  CN 2-12 grossly intact.  EXTREMITIES: No peripheral edema.  SKIN: No jaundice. No rashes.      Medical Decision  Making       55 MINUTES SPENT BY ME on the date of service doing chart review, history, exam, documentation & further activities per the note.      Data   Recent Labs   Lab 01/01/24  0842 01/01/24  0753 01/01/24  0557 12/30/23  0744 12/30/23  0621 12/29/23  0745 12/29/23  0533 12/28/23  1517 12/28/23  1455 12/28/23  1143 12/28/23  0848   WBC  --   --   --   --  11.2*  --  13.2*  --   --   --  11.6*   HGB  --   --   --   --  9.5*  --  10.0*  --   --   --  10.4*   MCV  --   --   --   --  85  --  87  --   --   --  86   PLT  --   --   --   --  234  --  258  --   --   --  260   NA  --   --   --   --  138  --  139  --  143  --  139  139   POTASSIUM  --   --   --   --  3.7  --  3.9  --  4.7  --  3.6  3.6   CHLORIDE  --   --   --   --  102  --  103  --  106  --  101  101   CO2  --   --   --   --  27  --  27  --  30*  --  25  25   BUN  --   --   --   --  14.7  --  19.7  --  19.7  --  21.4  21.4   CR  --   --   --   --  1.41*  --  1.43*  --  1.46*  --  1.56*  1.56*   ANIONGAP  --   --   --   --  9  --  9  --  7  --  13  13   LLOYD  --   --   --   --  8.3*  --  8.6*  --  8.7*  --  8.9  8.9   * 247* 185*   < > 202*   < > 403*   < > 127*   < > 347*  347*   ALBUMIN  --   --   --   --   --   --  3.4*  --   --   --  3.4*   PROTTOTAL  --   --   --   --   --   --  5.3*  --   --   --  5.2*   BILITOTAL  --   --   --   --   --   --  0.3  --   --   --  0.4   ALKPHOS  --   --   --   --   --   --  69  --   --   --  70   ALT  --   --   --   --   --   --  <5  --   --   --  <5   AST  --   --   --   --   --   --  9  --   --   --  9    < > = values in this interval not displayed.

## 2024-01-01 NOTE — PLAN OF CARE
Goal Outcome Evaluation:  Plan of Care Reviewed With: patient  Overall Patient Progress: no change    Outcome Evaluation: A&O x4. Denies pain. Thin liquids - No straws. Takes pills whole.  this morning. As the day went on, it continued to decrease. Later in the evening, BG was in the 80s - Apple juice given & pt ate dinner, BG increased to 103 - No correction insulin given to cover carb intake due to low BG. THis morning, pt complained of increased L sided wrist weakness - head CT ordered - No changes. As the day went on, weakness improved.    Shift: 0700 - 1930    Vital Signs: Temp: 97.7  F (36.5  C) Temp src: Oral BP: (!) 141/79 Pulse: 65   Resp: 18 SpO2: 99 % O2 Device: Nasal cannula Oxygen Delivery: 2 LPM   On O2 NC for pt comfort - Helps him sleep      CMS/Neuro: A&O x4      Cardio/Resp: No SOB or chest pain     Output: Continent of B/B - LBM: 12/31     Activity: Assist x 1 w/ walker and gait belt.      Skin: Skin tear to L hand/wrist - Covered w/ mepilex, currently CDI     Pain: Denied     Dressing: L hand/wrist skin tear - CDI  >> Dressing change due 1/1/24     LDA: R. Forearm PIV - Patent, Currently SL     Diet: 60 g CHO per meal, Easy to chew (level 7), Thin liquids - No straws, Good appetite, Medication whole     Additional Info: Call light w/in reach and able to make needs known     Plan: Continue POC - Discharge TBD       Ericka Silveira, RN, BSN, PHN

## 2024-01-01 NOTE — PROGRESS NOTES
Individualized Overall Plan Of Care (IOPOC)      Rehab diagnosis/Impairment Group Code: Brain dysfunction 02.22 traumatic, closed injury; fall with resulting intracranial hemorrhage  Intracranial hemorrhage (h)       Expected functional outcome: reach a level of mod I    Clinical Impression Comments:  functional decline post intracranial hemorrhage from a fall     Mobility:  Reach a level of mod I     ADL: pt83 year old right hand dominant male with a PMH of DM2 with DKA, paroxysmal atrial fibrillation formerly on apixaban, hypertension, hyperlipidemia, pelural effusion, CKD stage 3, BPH, and ACD who sustained a left cerebellar intracranial hemorrhage from a fall on 12/11/23 with a possible left medial frontal lobe infarct on imaging. His deficits include decreased visual acuity and mild left hemiparesis, pt at [facility] for therapy 6 months ago for cva will benift from ot per goals for 2 weeks home vs op therapsy 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    Communication/Cognition/Swallow: SLP: Pt seen in ARU for cognitive/linguistic evaluation following recent hospitalization for cerebellar CVA.  OT reported concerns over cognitive function during previous day session.  SLP administered the CLQT with pt scoring WNL across all domains compared to age norms.  Pt does exhibit mildly slowed processing. Pt will  benefit from further analysis of cognitive function with focus on tasks such as medication management, financial tasks. PLOF also included taking care of wife.     Intensity of therapy:   PT 60 minutes, 6x/week, for 18 days   OT 60 minutes, 6 times/week, for 18 days  SLP 60 minutes, 6 times/week, for 18 days       Education diabetes  Neuropsychology Testing: No        Medical Prognosis: good    Physician summary statement: functional decline post intracranial hemorrhage from a fall, goal is to reach a level of mod I     Discharge destination: prior home  Discharge rehabilitation needs: home care, RN, PT, OT and  SLP      Estimated length of stay: 18 days      Rehabilitation Physician Charlie Paez DO

## 2024-01-01 NOTE — PLAN OF CARE
Discharge Planner Post-Acute Rehab SLP:     Discharge Plan: Home, is primary caregiver for wife    Precautions: Fall, alarms    Current Status:  Hearing: WFL  Vision: Glasses  Communication: WFL  Cognition: WNL per CLQT   Swallow: regular/thin (0), no straws    Assessment:  SLP: Patient requiring extended time for mastication of regular solids but tolerated without increased risk of aspiration. Patient has been able to demonstrate good awareness of food items able to be tolerated given limitations of his dentition. Recommend diet advancement to regular textures and thin liquids.      Other Barriers to Discharge (Family Training, etc): Level of assist/supervision with meds, financial management TBD

## 2024-01-01 NOTE — PROGRESS NOTES
"  [facility]   Acute Rehabilitation Unit  Daily progress note    INTERVAL HISTORY  Tc Garcia was seen and examined at bedside this AM.  No isseus overnight.  Denies chest pain, shortness of breath, no fever or chills.  Feels better than yesterday and his L hand although not 100% is better than yesterday AM.  Sugars seem to be improving, appreciate support from Endo team.  Plan will be to restart insulin pump tomorrow.     Functional  PT:  Bed Mobility: cga  Transfer: cga to min A FWW  Gait: cga to min A with FWW 50'  Stairs: TBA   Balance: cga to min A with FWW formal assessment TBA       ROS: 10 point ROS neg other than the symptoms noted above in the HPI.        MEDICATIONS  Scheduled meds    amLODIPine  10 mg Oral Daily     carvedilol  12.5 mg Oral BID w/meals     enoxaparin ANTICOAGULANT  30 mg Subcutaneous Q24H     insulin aspart   Subcutaneous Daily with breakfast     insulin aspart   Subcutaneous Daily with lunch     insulin aspart   Subcutaneous Daily with supper     insulin aspart  1-7 Units Subcutaneous TID AC     insulin aspart  1-5 Units Subcutaneous At Bedtime     insulin glargine  9 Units Subcutaneous Q24H     irbesartan  300 mg Oral At Bedtime     isosorbide mononitrate  60 mg Oral QPM     sodium chloride (PF)  3 mL Intracatheter Q8H     tamsulosin  0.4 mg Oral QAM       PRN meds:  acetaminophen, glucose **OR** dextrose **OR** glucagon, insulin aspart, - MEDICATION INSTRUCTIONS -, senna-docusate      PHYSICAL EXAM  /63 (BP Location: Right arm, Patient Position: Semi-Bain's, Cuff Size: Adult Regular)   Pulse 67   Temp 97.7  F (36.5  C) (Oral)   Resp 17   Ht 1.753 m (5' 9\")   Wt 64 kg (141 lb)   SpO2 97%   BMI 20.82 kg/m    Gen: NAD, up in bed  HEENT: NCAT, EOMI  Cardio: 2+ radial pulse, well perfused  Pulm: non labored, on RA, symmetrical chest rise  Abd: soft, nontender  Extremities: warm, well perfused, no edema in bilateral lower extremities, no " tenderness in calves  MSK/neuro:  Mental Status:  alert and oriented x3  Cranial Nerves: grossly symmetric  Sensory: Symmetric to light touch in bilateral upper and lower extremities   Strength: 5/5 in all muscle groups of the upper and lower extremities on R, there was 2/5 with LUE myotomes yesterday AM that improved to 4/5 in afternoon and is like this today.     LABS    Lab Results   Component Value Date    WBC 11.2 12/30/2023    WBC 10.4 06/15/2021     Lab Results   Component Value Date    RBC 3.49 12/30/2023    RBC 4.08 06/15/2021     Lab Results   Component Value Date    HGB 9.5 12/30/2023    HGB 11.7 06/15/2021     Lab Results   Component Value Date    HCT 29.6 12/30/2023    HCT 36.3 06/15/2021     Lab Results   Component Value Date    MCV 85 12/30/2023    MCV 89 06/15/2021     Lab Results   Component Value Date    MCH 27.2 12/30/2023    MCH 28.7 06/15/2021     Lab Results   Component Value Date    MCHC 32.1 12/30/2023    MCHC 32.2 06/15/2021     Lab Results   Component Value Date    RDW 17.9 12/30/2023    RDW 14.2 06/15/2021     Lab Results   Component Value Date     12/30/2023     06/15/2021     Last Comprehensive Metabolic Panel:  Lab Results   Component Value Date     12/30/2023    POTASSIUM 3.7 12/30/2023    CHLORIDE 102 12/30/2023    CO2 27 12/30/2023    ANIONGAP 9 12/30/2023     (H) 01/01/2024    BUN 14.7 12/30/2023    CR 1.41 (H) 12/30/2023    GFRESTIMATED 49 (L) 12/30/2023    LLOYD 8.3 (L) 12/30/2023         ASSESSMENT AND PLAN  Tc Garcia is a 83 year old right hand dominant male with a PMH of DM2 with DKA, paroxysmal atrial fibrillation formerly on apixaban, hypertension, hyperlipidemia, pelural effusion, CKD stage 3, BPH, and ACD who sustained a left cerebellar intracranial hemorrhage from a fall on 12/11/23 with a possible left medial frontal lobe infarct on imaging.  His deficits include decreased visual acuity and mild left hemiparesis. He was admitted to acute  inpatient rehabilitation on 12/27/23.     Impairment group code: 02.22 Traumatic, Closed Injury; Fall with resulting intracranial hemorrhage     Rehabilitation Plan     PT, OT, and SLP on a daily basis, in addition to rehab nursing and close management of physiatrist.    Impairment of ADL's: OT for 60 minutes daily to work on ADL re-training such as grooming, self cares and bathing.    Impairment of mobility:  PT for 60 minutes daily to work on neuromuscular re-education focusing on strength, balance, coordination, and endurance.  SLP for 60 minutes to advance diet to improve PO intake.  Rehab RN for med administration, bowel regimen, glucose monitoring and wound care.      Medical Conditions     Rehab  #Neurological LLE weakness  #Traumatic brain injury  - PT, OT, SLP     #Bowel  - PRN bowel meds ordered     #Bladder  - bladder screening     #Skin  - Assess for presence of any pressure injury     #Sleep  - melatonin 10mg bedtime     Neuro  #Intraparenchymal hematoma    #Mild hydrocephalus   #H/o spontaneous intracranial hemorrhage (1/2023)  paroxysmal atrial fibrillation (on eliquis prior to admission)  Presented with dizziness diplopia and nausea. Workup found 1.5 X 3 cm intraparenchymal hematoma centered in inferior cerebellar vermis with likely extension into fourth ventricle; no hydrocephalus. Etiology ruled likely hypertensive in setting of chronic anticoagulation after fall.    - follow up with stroke neurology 6-8 weeks from 12/27  --repeat STAT CT head this AM for left hand weakness showed no new bleed or acute issues.      #Chronic C7 compression fracture  No dynamic instability.     CV  #Chronic diastolic CHF  #Atrial fibrillation PTA on Eliquis  #Hypertension  #Hyperlipidemia  Previously on apixaban, held after ICH. Anticoagulation was reversed and held. Echocardiogram with EF of 60 to 65%.  Severe biatrial enlargement.  Mild to moderate TR.  - lovenox ppx  - stroke neurology in 6-8 weeks  - BP goal  systolic 130-150  - continue amlodipine  - continue carvedilol  - continue avapro  - hold PTA demadex  - follow up with cardiology in 1 month to discuss Rony Camargo  #Diabetic ketoacidosis  #Diabetes mellitus type II HgbA1c 8.1% 12/12/23  Patient's insulin pump had been on hold since admission in the setting of [place]. Blood sugars labile during hospital stay. Patient went into DKA on 12/24/2023.  Likely triggered by infection with rise in the WBC count hence pump discontinued and patient switched to insulin drip per DKA protocol. DKA resolved on 12/25/2023, but then he had another episode of likely DKA on morning of 12/28/23 which may have been due to patient confusion about use of pump / injectable insulin  - endo will follow for education to transition back to insulin pump on 1/2/24  - Continue Novolog cho 1/9 breakfast and 1/10 for lunch and dinner and snacks.  - aspart medium  correction 1/50 with meals and snacks  - Decrease glargine 10 units today at 9 am.  - Patient wants to restart  on TUESDAY if possible, will need to stop Lantus on 1/2  - hypoglycemia protocol  - education needs : formal pump assessment/support for restart. Consult ordered  - no orders have been written for insulin pump now for clarity     Pulm  #Possible aspiration pneumonia.  Agitation, leukocytosis on 12/12 to 12/13. No growth blood cultures.  CXR R > L lower lung opacity.  Completed IV ceftriaxone - cefuroxime course.  - CXR in ~ 4 weeks (~1/27/24)  - Incentive spirometry  - aspiration precautions     ID  #Presumed sepsis likely due to UTI  Patient went into DKA with rising white count and mildly positive UA.  Of note, patient had just completed treatment for blood pneumonia UTI, question if this was not completely treated.  Urine culture with no growth. Started on vanc/zosyn and narrowed to ceftriaxone 12/27/23  - Complete 7 day total course of abx with ceftriaxone to end 1/2/24     Renal  #CKD Stage 3  Baseline creatinine  1-1.1  - continue irbesartan  - consider readding torsemide prn for edema     #Anemia of chronic disease  - monitor     FEN/GI  #Diet  - Minced moist and thin liquids (dentures)  - consistent carb 60g  - to advance on avice of SLP     Disposition  Code status: DNR/DNI  ELOS: 12 days  Rehab Prognosis: good     #Follow Up  - Primary care provider, Jessika Cordoba in 1-2 weeks from discharge  - Stroke neurology in 6-8 weeks from 12/27/23  - cardiology in 1 month from 12/27/23  - CXR in ~ 1/27/24       Charlie Paez, DO  Physical Medicine & Rehabilitation    I spent a total of 35 minutes face to face and coordinating care of Tc Garica.  Over 50% of my time on the unit was spent counseling the patient and /or coordinating care regarding post TBI.

## 2024-01-01 NOTE — PLAN OF CARE
Goal Outcome Evaluation:      Plan of Care Reviewed With: patient    Overall Patient Progress: no change    Outcome Evaluation: Pt. alert oreinted, can be forgetful. Continent of bowel and bladder uses bathroom toilet, LBM 1/1/2024. Pt. c/o of feeling week for transfer,uses batsheva stedy to toilet, pt appears strong and need minimal asistance only during transfer. BG checked multiple times this shift  lastest  mg.dl. Safety measures in place, call light within reach. Continue with current POC.

## 2024-01-01 NOTE — PLAN OF CARE
Goal Outcome Evaluation:           Overall Patient Progress: no change    Outcome Evaluation: Pt.alert orientedx4  c/o of feeling weak, VS checked, stable. Continent of bowel and bladder, LBM 12/31/24. , no correction given, snacks offered. Safety measures in place, call light within reach. Will continue to monitor the patient.

## 2024-01-01 NOTE — PROGRESS NOTES
Observation goals PRIOR TO DISCHARGE     Comments: List all  goals to be met before discharge:   - Diagnostic tests and consults completed and resulted: not met   - No further episodes of syncope and any new arrhythmia addressed with controlled heart rates: TOAN  - Vital signs normal or at patient baseline and orthostatic vitals are normal and patient not lightheaded with standing TOAN   - Tolerating oral intake to maintain hydration TOAN  - Safe disposition plan has been identified not met  - Nurse to notify provider when observation goals have been met and patient is ready for discharge.           15

## 2024-01-01 NOTE — PROGRESS NOTES
Diabetes Consult Daily  Progress Note          Assessment/Plan:   Tc Garcia is a 83 year old right hand dominant male with a relevant PMH of DM1 on insulin pump (history of DKA), paroxysmal atrial fibrillation on eliquis, HTN, HLD, CKD stage 3, recurrent pleural effusion, and history of spontaneous intracranial hemorrhage who sustained a left intraparenchymal hematoma after a fall on 12/11/23, and found to have left medial frontal lobe diffusion restriction with sequelae of  left sided weakness, lower extremity more than upper extremity, as well as increased blurriness of vision.     1.Insulin Dependent type 1 diabetes  2. Labile BG    - Continue Novolog cho 1/9 breakfast and 1/10 for lunch and dinner and snacks.  - aspart medium  correction 1/50 with meals and snacks  - Decrease glargine 10 units today at 9 am.  - Patient wants to restart  on TUESDAY if possible, will need to stop Lantus on 1/2  - hypoglycemia protocol  - education needs : formal pump assessment/support for restart. Consult ordered       Outpatient diabetes follow up: Dr Asif at \[facility]\"" clinic of Endocrinology  Plan discussed with patient, bedside RN/ via this ntoe.           Interval History:     The last 24 hours progress and nursing notes reviewed.  Last creat stable at 1.41 on 12/30        Much more activity with rehab so maybe BG lower yesterday.  BG=76 and 71 after carb coverage so will decrease carb coverage and then big rebound    After snack with apple juice  Decrease lantus 10 units and decrease carb coverage from 1:7  Recent Labs   Lab 01/01/24  0303 01/01/24  0110 12/31/23  2038 12/31/23  1906 12/31/23  1640 12/31/23  1235   GLC 99 74 103* 87 94 223*          Medications impacting glycemia:   no steroid        Nutrition:     Orders Placed This Encounter      Combination Diet Moderate Consistent Carb (60 g CHO per Meal) Diet; Easy to Chew (level 7); Thin Liquids (level 0)            PTA Regimen:   INSULIN  "PUMP -  Medtronic minimed  Sensitivity factor (midnight to midnight): 60  Bolus (units:gram): 8549-2690 = 1:9, 4747-2019 = 1:7, 3439-1030 = 1:7, 7475-5573 = 1:9, 3648-7851 = 1:13   Basal Rates and Start/End times:   Rate #1 =  0.45 units/hr from 0000 to 0200.   Rate #2 = 0.45 units/hr from 0201 to 0600.   Rate #3 = 0.625 units/hr from 0601 to 0900.   Rate #4 = 0.625 units/hr from 0901 to  1700   Rate #5 = 0.50 units/hr from 1701 to 2359.      Maintain blood glucose level within a specified target range  4386-9293 110-150  4809-6185 100-120  0703-0727 100-140    Active insulin time 4 hours               Review of Systems:   See interval hx            Physical Exam:   Blood pressure 105/61, pulse 58, temperature 97.7  F (36.5  C), temperature source Oral, resp. rate 16, height 1.753 m (5' 9\"), weight 64 kg (141 lb), SpO2 98%.    Gen: Alert, in NAD   HEENT: NC/AT,  hearing intact to conversational volume  Resp: Unlabored, remains on room air  Neuro: oriented x3, communicating clearly  Psych: calm mood, easily engaged         Data:     Lab Results   Component Value Date    A1C 8.1 12/12/2023    A1C 8.0 04/20/2023    A1C 7.2 01/17/2023    A1C 7.7 11/20/2020    A1C 7.4 07/08/2020    A1C 8.2 04/25/2020    A1C 7.7 10/31/2019    A1C 7.2 06/30/2009          ROUTINE IP LABS (Last four results)  BMP  Recent Labs   Lab 01/01/24  0303 01/01/24  0110 12/31/23  2038 12/31/23  1906 12/30/23  0744 12/30/23  0621 12/29/23  0745 12/29/23  0533 12/28/23  1517 12/28/23  1455 12/28/23  1143 12/28/23  0848   NA  --   --   --   --   --  138  --  139  --  143  --  139  139   POTASSIUM  --   --   --   --   --  3.7  --  3.9  --  4.7  --  3.6  3.6   CHLORIDE  --   --   --   --   --  102  --  103  --  106  --  101  101   LLOYD  --   --   --   --   --  8.3*  --  8.6*  --  8.7*  --  8.9  8.9   CO2  --   --   --   --   --  27  --  27  --  30*  --  25  25   BUN  --   --   --   --   --  14.7  --  19.7  --  19.7  --  21.4  21.4   CR  --   --   --  "  --   --  1.41*  --  1.43*  --  1.46*  --  1.56*  1.56*   GLC 99 74 103* 87   < > 202*   < > 403*   < > 127*   < > 347*  347*    < > = values in this interval not displayed.     CBC  Recent Labs   Lab 12/30/23  0621 12/29/23  0533 12/28/23  0848 12/28/23  0600   WBC 11.2* 13.2* 11.6* 11.0   RBC 3.49* 3.60* 3.80* 3.80*   HGB 9.5* 10.0* 10.4* 10.5*   HCT 29.6* 31.4* 32.8* 33.1*   MCV 85 87 86 87   MCH 27.2 27.8 27.4 27.6   MCHC 32.1 31.8 31.7 31.7   RDW 17.9* 17.8* 17.5* 17.7*    258 260 258     INRNo lab results found in last 7 days.  Lab Results   Component Value Date    AST 9 12/29/2023    AST 12 03/22/2021     Lab Results   Component Value Date    ALT <5 12/29/2023    ALT 5 03/22/2021     Lab Results   Component Value Date    BILICONJ 0.0 09/06/2006      Lab Results   Component Value Date    BILITOTAL 0.3 12/29/2023    BILITOTAL 0.5 03/22/2021     Lab Results   Component Value Date    ALBUMIN 3.4 12/29/2023    ALBUMIN 4.4 01/15/2023    ALBUMIN 4.0 03/22/2021     Lab Results   Component Value Date    PROTTOTAL 5.3 12/29/2023    PROTTOTAL 5.6 03/22/2021      Lab Results   Component Value Date    ALKPHOS 69 12/29/2023    ALKPHOS 62 03/22/2021       Ronit Sharma MD  Staff Physician  Division of Endocrinology  [facility]  Pager #[phone]          To contact Endocrinology Diabetes Management service:   From [phone]: Kenan or page inpatient diabetes provider that is following the patient that day (see filed or incomplete progress notes/consult notes).  If uncertain of provider assignment: page job code [phone].  For nursing questions or updates from [phone], please first page the primary team.  Primary team provider will then reach out to the on-call endocrinology fellow as needed.    Please notify inpatient diabetes service if changes are planned that will impact glycemia, such as changes to steroids, enteral feeding, parenteral feeding, dextrose fluids or procedures requiring prolonged NPO  status.

## 2024-01-02 NOTE — PROGRESS NOTES
Diabetes Consult Daily  Progress Note          Assessment/Plan:     HPI:  Tc Garcia is a 83 year old right hand dominant male with a relevant PMH of DM1 on insulin pump (history of DKA), paroxysmal atrial fibrillation on eliquis, HTN, HLD, CKD stage 3, recurrent pleural effusion, and history of spontaneous intracranial hemorrhage who sustained a left intraparenchymal hematoma after a fall on 12/11/23, and found to have left medial frontal lobe diffusion restriction with sequelae of  left sided weakness, lower extremity more than upper extremity, as well as increased blurriness of vision.    Assessment:        Type 1 Diabetes Mellitus who presented in DKA (resolved) with diabetes c/b nephropathy, neuropathy who is managed outpatient on Medtronic Cytonicsed amb pump and Lucho CGM. A1c 8.1% (less stringent control for elderly patients with sig medical complexities for safety)   2.   Labile BG  3.   Spontaneous intracranial hemorrhage; L sided weakness and blurred vision; improved    Plan:        -  Lantus held - assessed patient to be competent to resume pump and has all his supplies here    -  ** patient has T1DM - requires insulin, basal dose must not to be withheld entirely OR for prolonged periods, high risk for DKA**.  If trending >250 mg/dL longer than 4 hours, please draw appropriate labs to determine if DKA and treat accordingly.       -   Medtronic MiniMed 630g and Lucho CGM     Basal Rates and Start/End times:   Rate #1 = 0.35 units/hr from 0000 to 0200.   Rate #2 = 0.35 units/hr from 0201 to 0600.   Rate #3 = 0.35 units/hr from 0601 to 0900.   Rate #4 = 0.35 units/hr from 0901 to  1700   Rate #5 = 0.35 units/hr from 1701 to 2359.      Bolus:   8951-8555:1 unit(s) per 11g cho  3760-7203: 1 unit(s) per 11g cho  6690-4968: 1 unit(s) per 11g cho  7024-3377: 1 unit(s) per 11g cho  0757-1175: 1 unit(s) per 11g cho    Sensitivity factor:   0000 - 2359: 60    BG targets:   9068-5762  110-150  0850-0953 100-120  1821-0960 100-140    Active insulin time: 2 hours     - hypoglycemia protocol  - education needs : formal pump assessment/support for restart. Consult ordered  - Outpatient diabetes follow up: Dr Asif at [facility] clinic of Endocrinology  -  Diabetes service will continue to follow, please do not hesitate to contact the team with any questions/concerns. First call after hours is to primary service.    -  Please notify inpatient diabetes service if changes are planned that will impact glycemia, such as NPO, starting/adjusting steroids, enteral feeding, parenteral feeding, dextrose fluids or procedures.       Plan discussed with patient, CDE, bedside RN/primary team via this note.         Interval History:     The last 24 hours progress and nursing notes reviewed.      BG trend:  < target - will adjust pump settings accordingly given his low trends.     Basal Lantus was discontinued, likey since he wanted to re-start his pump.   Will be sure to re-start pump this AM as to avoid lapse in insulin.   BG is trending high at 252 mg/dL this AM   Last dose of lantus yesterday at 1147 AM  Will re-start pump at 0900 with some overlap in basal to assist in the reduction in BG.   Did not get cho coverage by RN for his meal.    Will give a bolus of 2.2 unit(s) for cho/sliding scale insulin now at 8:42 AM for the higher BG.       Tc able to connect his pump with minor assist - writer lifted skin so the surface was flat - he did all other skills.   He was able to prep the tubing, prime tubing, and clear all setting - no active insulin.  He re-set the date and time  He understood the need for the basal reduction given his trends.     Aware will reduce the ICR as well and trend BG          Planned Procedures/surgeries: none  D5W-containing solutions/medications: none    Recent Labs   Lab 01/02/24  0205 01/01/24  2113 01/01/24  1918 01/01/24  1900 01/01/24  1612 01/01/24  1131   * 94 97 71 93 196*  "        Nutrition:     Orders Placed This Encounter      Combination Diet Moderate Consistent Carb (60 g CHO per Meal) Diet; Regular Diet; Thin Liquids (level 0)          PTA Regimen:   INSULIN PUMP -  Medtronic minimed  Sensitivity factor (midnight to midnight): 60  Bolus (units:gram): 4883-6003 = 1:9, 8548-7468 = 1:7, 2145-1122 = 1:7, 1238-5071 = 1:9, 4747-4346 = 1:13   Basal Rates and Start/End times:   Rate #1 =  0.45 units/hr from 0000 to 0200.   Rate #2 = 0.45 units/hr from 0201 to 0600.   Rate #3 = 0.625 units/hr from 0601 to 0900.   Rate #4 = 0.625 units/hr from 0901 to  1700   Rate #5 = 0.50 units/hr from 1701 to 2359.      Maintain blood glucose level within a specified target range  9012-3914 110-150  2662-5055 100-120  5027-2646 100-140    Active insulin time 4 hours              Review of Systems:   CC: denies all          Medications:   Steroid planning:  none       Physical Exam:   /56 (BP Location: Left arm, Patient Position: Sitting)   Pulse 61   Temp 97.9  F (36.6  C) (Oral)   Resp 16   Ht 1.753 m (5' 9\")   Wt 64 kg (141 lb)   SpO2 98%   BMI 20.82 kg/m      General:   A&O, NAD, pleasant, resting in chair    HEENT:  NC/AT. MMM, EOMI, Anicteric. Hearing WNL.   Lungs:  unremarkable, no new cough, no SOB  ABD:   rounded, soft   Extremities:  Moving all extremities  Skin:  warm and dry, no obvious lesions/rash/bleeding  Neuro:  No focal neurological deficits  Psych:   Cooperative, appropriate mood, good eye contact, congruent affect          Data:     Lab Results   Component Value Date    A1C 8.1 12/12/2023    A1C 8.0 04/20/2023    A1C 7.2 01/17/2023    A1C 7.7 11/20/2020    A1C 7.4 07/08/2020    A1C 8.2 04/25/2020    A1C 7.7 10/31/2019    A1C 7.2 06/30/2009        CBC RESULTS:   Recent Labs   Lab Test 12/30/23  0621   WBC 11.2*   RBC 3.49*   HGB 9.5*   HCT 29.6*   MCV 85   MCH 27.2   MCHC 32.1   RDW 17.9*        Recent Labs   Lab Test 01/02/24  0205 01/01/24 2113 12/30/23  0744 " 12/30/23  0621 12/29/23  0745 12/29/23  0533   NA  --   --   --  138  --  139   POTASSIUM  --   --   --  3.7  --  3.9   CHLORIDE  --   --   --  102  --  103   CO2  --   --   --  27  --  27   ANIONGAP  --   --   --  9  --  9   * 94   < > 202*   < > 403*   BUN  --   --   --  14.7  --  19.7   CR  --   --   --  1.41*  --  1.43*   LLOYD  --   --   --  8.3*  --  8.6*    < > = values in this interval not displayed.     Liver Function Studies -   Recent Labs   Lab Test 12/29/23  0533   PROTTOTAL 5.3*   ALBUMIN 3.4*   BILITOTAL 0.3   ALKPHOS 69   AST 9   ALT <5     Lab Results   Component Value Date    INR 1.35 12/11/2023    INR 1.13 01/15/2023    INR 1.50 11/21/2020    INR 1.18 07/17/2020    INR 1.49 05/10/2020    INR 1.88 04/16/2020    INR 1.28 02/26/2020    INR 1.65 12/31/2019    INR 1.44 11/07/2019    INR 1.37 10/31/2019    INR 1.40 10/30/2019     Marianne Adames, O[facility], [facility]  Inpatient Diabetes Management Service  Pager - [phone]  Available on [facility]     To contact Endocrine Diabetes service:   From 7AM-5PM: page inpatient diabetes provider who is following the patient that day (see filed or incomplete progress notes/consult notes).  If uncertain of provider assignment: page job code [phone]. (To page job code in-house dial 3 stars, [phone] then enter number).  For questions or updates AFTER HOURS from 5PM-7AM: page the diabetes job code for on call fellow: [phone]    Please notify inpatient diabetes service if changes are planned to steroids, nutrition, or if procedures are planned requiring prolonged NPO status. Diabetes Management Team job code: [phone]       I spent > 50 minutes on the date of the encounter doing prep/post-work, chart review, history and exam, documentation and further activities per the note including lab review, multidisciplinary communication, counseling the patient and/or coordinating care regarding acute hyper/hypoglycemic management, as well as discharge management and  planning/communication.  See note for details.

## 2024-01-02 NOTE — PLAN OF CARE
Goal Outcome Evaluation:  Melatonin requested per pt for bedtime    Orientation: Aox4  Bowel: continent   Bladder: Continent   Ambulation/Transfers:Ax1 walker   Diet/ Liquids:Regular   Tubes/ Lines/ Drains: R PIV SL  Tube Feeding: n/a   Oxygen:RA  Skin: L wrist skin tear

## 2024-01-02 NOTE — PLAN OF CARE
Discharge Planner Post-Acute Rehab SLP:     Discharge Plan: Home, is primary caregiver for wife    Precautions: Fall, alarms    Current Status:  Hearing: WFL  Vision: Glasses  Communication: WFL  Cognition: WNL per CLQT. Subjectively, impaired reasoning, attention, immediate memory. Further assessment to be completed or deferred to neuropsych   Swallow: regular, thin (0) liquid. NO STRAWS     Assessment:  Pt provided education re: results from assessment and deficits noted subjectively across therapies. SLP educated re: plan for therapy and most likely need for increased IADL support from family pending progress. Pt verbalized understanding and agreeable. Pt reported IND wtih all IADLs at baseline for himself and spouse. He further expressed few instances of family needing to take over this year following hospitalization. Pt completed medication management subtest of SAMSON as task. Pt completed with 70% IND, 100% min cues. Noting significantly delayed processing speed, impaired attention, impaired immediate memory and reasoning. Would recommend assist with medications upon discharge. Neuropsych planning on cognitive assessment     Pt meal delivered by nursing despite instruction. Pt consumed majority of meal prior to arrival. Despite this, was still agreeable to more PO. Straw present at bedside despite recommendation for no straw. SLP provided re education and pt reported to be aware of this despite not following through. Pt seen with remaining meal of regular texture, thin (0) liquid by cup. Extended mastication, timely AP transit, min oral residuals. Pt with single instance throat clear following liquid otherwise no s/sx aspiration. Recommend continue current diet, NO STRAWS.     Other Barriers to Discharge (Family Training, etc): family training: IADL assist

## 2024-01-02 NOTE — PROGRESS NOTES
[facility]   Acute Rehabilitation Unit    INTERVAL HISTORY  Notes and labs reviewed over past 24 hours. No acute overnight events reported    Patient was seen and examined. He reports no new concerns or complaints. He understands balance is a large barrier and he has a long way to go to progress with therapy. He is excited that he is restarted on his insulin pump (replaced this AM by diabetes service).     ROS: 10 point ROS was assessed and was negative unless otherwise stated in HPI      Functionally,    With PT: 1/01  Current Status:  Bed Mobility: cga  Transfer: cga to min A FWW  Gait: cga to min A with FWW 50'  Stairs: TBA   Balance: cga to min A with FWW formal assessment TBA    With OT: 1/02  ADLs:  Mobility: CGA/min A with walker d/t  posterior leaning  Grooming: CGA standing with walker, set-up seated  Dressing: SBA with UBD seated, min A with LBD tasks with walker. Mod A with footwear.    Bathing: Recommend tub bench-pt declined initial shower  Toileting: CGA/min A with walker  IADLs: further assessment  Vision/Cognition: further assessment    With SLP: 1/01  Current Status:  Hearing: WFL  Vision: Glasses  Communication: WFL  Cognition: WNL per CLQT   Swallow: regular/thin (0), no straws     Assessment:  SLP: Patient requiring extended time for mastication of regular solids but tolerated without increased risk of aspiration. Patient has been able to demonstrate good awareness of food items able to be tolerated given limitations of his dentition. Recommend diet advancement to regular textures and thin liquids.          MEDICATIONS  Scheduled meds   amLODIPine  10 mg Oral Daily    carvedilol  12.5 mg Oral BID w/meals    enoxaparin ANTICOAGULANT  30 mg Subcutaneous Q24H    insulin   Subcutaneous 4x Daily AC & HS    insulin   Subcutaneous TID AC    insulin lispro   Device See Admin Instructions    irbesartan  300 mg Oral At Bedtime    isosorbide mononitrate  60 mg Oral QPM  "   sodium chloride (PF)  3 mL Intracatheter Q8H    tamsulosin  0.4 mg Oral QAM       PRN meds:  acetaminophen, glucose **OR** dextrose **OR** glucagon, insulin, melatonin, - MEDICATION INSTRUCTIONS -, senna-docusate      PHYSICAL EXAM  /68 (BP Location: Right arm, Patient Position: Sitting, Cuff Size: Adult Regular)   Pulse 78   Temp 98.1  F (36.7  C) (Oral)   Resp 16   Ht 1.753 m (5' 9\")   Wt 64 kg (141 lb)   SpO2 97%   BMI 20.82 kg/m    General: in no acute distress, conversational and alert, resting comfortably in bed  HEENT: atraumatic, nares clear, conjunctiva white  Pulmonary: on room air, lungs clear to auscultation bilaterally  Cardiovascular: RRR, no murmur auscultated, well-perfused peripherally  Abdominal: soft, non-tender to palpation, non-distended  Extremities: warm peripherally, no edema in BLEs  Skin: warm, dry, without erythema, ecchymosis, or rash noted  MSK: Moves all four extremities volitionally and against gravity.   Neuro: conjugate gaze, speech non-dysarthric,and comprehensible       LABS  Results for orders placed or performed during the hospital encounter of 12/29/23 (from the past 24 hour(s))   Glucose by meter   Result Value Ref Range    GLUCOSE BY METER POCT 93 70 - 99 mg/dL   Glucose by meter   Result Value Ref Range    GLUCOSE BY METER POCT 71 70 - 99 mg/dL   Glucose by meter   Result Value Ref Range    GLUCOSE BY METER POCT 97 70 - 99 mg/dL   Glucose by meter   Result Value Ref Range    GLUCOSE BY METER POCT 94 70 - 99 mg/dL   Glucose by meter   Result Value Ref Range    GLUCOSE BY METER POCT 100 (H) 70 - 99 mg/dL   Glucose by meter   Result Value Ref Range    GLUCOSE BY METER POCT 130 (H) 70 - 99 mg/dL   Glucose by meter   Result Value Ref Range    GLUCOSE BY METER POCT 190 (H) 70 - 99 mg/dL   Platelet count   Result Value Ref Range    Platelet Count 211 150 - 450 10e3/uL   Creatinine   Result Value Ref Range    Creatinine 1.42 (H) 0.67 - 1.17 mg/dL    GFR Estimate 49 " (L) >60 mL/min/1.73m2   Glucose by meter   Result Value Ref Range    GLUCOSE BY METER POCT 194 (H) 70 - 99 mg/dL       ASSESSMENT AND PLAN    Tc Garcia is a 83 year old right hand dominant male with a PMH of DM2 with DKA, paroxysmal atrial fibrillation formerly on apixaban, hypertension, hyperlipidemia, pelural effusion, CKD stage 3, BPH, and ACD who sustained a left cerebellar intracranial hemorrhage from a fall on 12/11/23 with a possible left medial frontal lobe infarct on imaging.  His deficits include decreased visual acuity and mild left hemiparesis. He was admitted to acute inpatient rehabilitation on 12/27/23.     Impairment group code: 02.22 Traumatic, Closed Injury; Fall with resulting intracranial hemorrhage      PT, OT and SLP 60 minutes of each on a daily basis, in addition to rehab nursing and close management of physiatrist.      Impairment of ADL's: OT for 60 min daily to work on upper and lower body self care, dressing, toileting, bathing, energy conservation techniques with use of ADs as needed.     Impairment of mobility:  PT for 60 min daily to work on gait exercises, strengthening, endurance buildup, transfers with use of walker as needed.     Impairment of cognition/language/swallow:  SLP for 60 min daily for cognitive evaluation and treatment strategies for higher level cognitive deficits and memory impairment.     Rehab RN to administer medication, patient education on medication taking, VS monitoring, bowel regimen, glucose monitoring and wound care/surgical wound dressing changes and monitoring.         Medical Conditions    #Traumatic brain injury  #Intraparenchymal hematoma    #Mild hydrocephalus   #H/o spontaneous intracranial hemorrhage (1/2023)  paroxysmal atrial fibrillation (on eliquis prior to admission)  Presented with dizziness diplopia and nausea. Workup found 1.5 X 3 cm intraparenchymal hematoma centered in inferior cerebellar vermis with likely extension into fourth  ventricle; no hydrocephalus. Etiology ruled likely hypertensive in setting of chronic anticoagulation after fall.    - follow up with stroke neurology 6-8 weeks from 12/27  --Had left hand weakness on 12/31 and stat head CT showed no new intracranial abnormalities  -Continue PT, OT, SLP  -Will undergo full neuropsych testing this week due to concern of cognitive status/ability.      #Chronic C7 compression fracture  No dynamic instability.       #Chronic diastolic CHF  #Atrial fibrillation PTA on Eliquis  #Hypertension  #Hyperlipidemia  Previously on apixaban, held after ICH. Anticoagulation was reversed and held. Echocardiogram with EF of 60 to 65%.  Severe biatrial enlargement.  Mild to moderate TR.  - lovenox ppx  - stroke neurology in 6-8 weeks  - BP goal systolic 130-150  - continue amlodipine  - continue carvedilol  - continue avapro  - hold PTA demadex  - follow up with cardiology in 1 month to discuss Watchman       #Diabetic ketoacidosis  #Diabetes mellitus type II HgbA1c 8.1% 12/12/23  Patient's insulin pump had been on hold since admission in the setting of IPH. Blood sugars labile during hospital stay. Patient went into DKA on 12/24/2023.  Likely triggered by infection with rise in the WBC count hence pump discontinued and patient switched to insulin drip per DKA protocol. DKA resolved on 12/25/2023, but then he had another episode of likely DKA on morning of 12/28/23 which may have been due to patient confusion about use of pump / injectable insulin  - Endo consulted and following. 1/02, restarted PTA insulin pump.   --If trending >250 mg/dL longer than 4 hours, please draw appropriate labs to determine if DKA and treat accordingly.    - hypoglycemia protocol  - education needs : formal pump assessment/support for restart. Consult ordered  - no orders have been written for insulin pump now for clarity       #Possible aspiration pneumonia.  Agitation, leukocytosis on 12/12 to 12/13. No growth blood  cultures.  CXR R > L lower lung opacity.  Completed IV ceftriaxone - cefuroxime course.  - CXR in ~ 4 weeks (~1/27/24)  - Incentive spirometry  - aspiration precautions     #Presumed sepsis likely due to UTI  Patient went into DKA with rising white count and mildly positive UA.  Of note, patient had just completed treatment for blood pneumonia UTI, question if this was not completely treated.  Urine culture with no growth. Started on vanc/zosyn and narrowed to ceftriaxone 12/27/23  - Complete 7 day total course of abx with ceftriaxone to end 1/2/24       #CKD Stage 3  Baseline creatinine 1-1.1  - continue irbesartan  - consider readding torsemide prn for edema     #Anemia of chronic disease  - monitor    Adjustment to disability:  Clinical psychology to eval and treat  Bowel: PRN meds available  Bladder: Continent  DVT Prophylaxis: Lovenox  GI Prophylaxis: On regular diet, will assess for GI symptoms  Code: DNR/DNI  Disposition: Home vs. Alternate level of care.  ELOS:  1/13.  Follow up Appointments on Discharge:   -Primary care provider, Jessika Cordoba in 1-2 weeks from discharge  Stroke neurology in 6-8 weeks from 12/27/23  cardiology in 1 month from 12/27/23  CXR in ~ 1/27/24        Seen and discussed with Dr. Paez, PM&R staff physician     [place]alera,   PGY2  Physical Medicine and Rehabilitation-[facility]  Pager: [phone]

## 2024-01-02 NOTE — PLAN OF CARE
Goal Outcome Evaluation:  Plan of Care Reviewed With: patient  Overall Patient Progress: improving    Outcome Evaluation: A&O - Forgetful at times. Feeling more strong this shift. Pts checked BG around 1900 - BG was low. With hospital BG check, BG was 71. 1/2 carton of milk and 2 packages of marlyn crackers given (28 carbs). Will recheck in 15 minutes per orders. - Rechecked at 1718 - 97.     Shift: 0700 - 1930    Vital Signs: Temp: 97.9  F (36.6  C) Temp src: Oral BP: 122/58 Pulse: 64   Resp: 16 SpO2: 96 % O2 Device: None (Room air)      CMS/Neuro: A&O x4      Cardio/Resp: No SOB or chest pain     Output: Continent of B/B - LBM: 1/1/24     Activity: Assist x 1 w/ walker and gait belt.      Skin: Skin tear to L. Wrist - Covered w/ mepilex      Pain: Denied     Dressing: L. Wrist - CDI - Changed today     LDA: R forearm PIV - Patent, Currently SL     Diet: 60 g CHO per meal, Regular, Thin liquids - No straws, Good appetite, Medication whole      Additional Info: Call light w/in reach and able to make needs known     Plan: Continue POC - Discharge TBD       Ericka Silveira, RN, BSN, PHN

## 2024-01-02 NOTE — PLAN OF CARE
Goal Outcome Evaluation:             Outcome Evaluation: pt Alert and oriented x4 . Able to make needs known. Assist x1 with walker and gait belt.Continent of bowel and bladder.  LBM 1/1.  Patient restarted on insulin pump today. BG this morning was 190 and 194 at lunch time. Per patient 12 units of insulin administered via insulin pump at noon.  Patient called the nurse at 1445 and said he thinks his blood sugar is dropping. BG checked =79. Juice marlyn crackers with peanut butter given to patient. BG rechecked= 81. Incoming shift updated and will recheck the BG.

## 2024-01-02 NOTE — PLAN OF CARE
Goal Outcome Evaluation:  Acute Rehab Care Conference/Team Rounds      Type: Team Rounds    Present: Dr. Paez, Resident Dr. Escalera, Dr. Maria T Ambriz Neuropsychologist, Elan Yost PT, Meño Santos OT, Mariposa Jang SLP, Denita Avery , Naz Johnson RD, Raghavendra Robertson RN, and Tc Garcia Patient.      Discharge Barriers/Treatment/Education    Rehab Diagnosis: intraparenchymal hematoma with fall    Active Medical Co-morbidities/Prognosis:     Patient Active Problem List   Diagnosis Code    DM type 2, Hgb A1C 8.2 on 4/25/20 E11.9    Mixed hyperlipidemia E78.2    Essential hypertension I10    Pain in limb M79.609    Plantar fascial fibromatosis M72.2    HYPERLIPIDEMIA LDL GOAL <100 E78.5    Hemothorax on left J94.2    Recurrent Left Pleural effusion -- S/P Pleurex Cath 5/12/20 J90    ABBIE (acute kidney injury) (H24) N17.9    Acute respiratory failure with hypoxia (H) J96.01    Pulmonary hypertension (H) I27.20    CRF (chronic renal failure), stage 3  N18.30    Anemia due to chronic kidney disease N18.9, D63.1    Atrial fibrillation/flutter -- on Eliquis and Amiodarone I48.91    DKA (diabetic ketoacidoses) E11.10    Anemia in CKD (chronic kidney disease) N18.9, D63.1    Dyspnea R06.00    SOB (shortness of breath) R06.02    Non-recurrent bilateral inguinal hernia without obstruction or gangrene K40.20    Acute cholecystitis K81.0    Abdominal pain, generalized R10.84    Non-intractable vomiting with nausea, unspecified vomiting type R11.2    Alcohol dependence (H) F10.20    CHF (congestive heart failure) (H) I50.9    Syncope and collapse R55    Weakness R53.1    Postoperative state Z98.890    Acute kidney injury (H24) N17.9    Chronic diastolic heart failure (H) I50.32    Anticoagulated Z79.01    Right-sided nontraumatic intraventricular intracerebral hemorrhage (H) I61.5    Dehydration E86.0    Hypoglycemia E16.2    Hypoxia R09.02    DNR (do not resuscitate) Z66    Hypotension, unspecified  hypotension type I95.9    Diabetic nephropathy associated with type 2 diabetes mellitus (H) E11.21    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, or unspecified(403.00) I12.9    Nephrotic range proteinuria R80.9    Secondary hyperparathyroidism of renal origin (H24) N25.81    Stage 3b chronic kidney disease (H) N18.32    Vitamin D deficiency E55.9    Diabetic ketoacidosis without coma associated with type 2 diabetes mellitus (H) E11.10    Intraparenchymal hemorrhage of brain (H) I61.9    Acute cystitis without hematuria N30.00    Constipation, unspecified constipation type K59.00    Pain in extremity, unspecified extremity M79.609    Type 2 diabetes mellitus with other specified complication, with long-term current use of insulin (H) E11.69, Z79.4    Nontraumatic intraventricular intracerebral hemorrhage, unspecified laterality (H) I61.5    Iron deficiency anemia D50.9    Intracranial hemorrhage (H) I62.9    Hyperglycemia R73.9    Inadequately controlled diabetes mellitus (H) E11.65         Safety: Patient is alert and oriented, able to make needs known, uses the call light.     Pain: No pain or discomfort reported overnight.     Medications, Skin, Tubes/Lines: Takes pills whole, no straws. Left wrist skin tear with mepilex, right PIV, sacrum with mepilex. No tubes, no drains.     Swallowing/Nutrition: regular, thin (0), no straws     Bowel/Bladder: Continent of bowel and bladder. Last BM 01/01.    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  Pt's spouse can physically assist, she has dementia and pt would like to get home as soon as possible due to this.  Pt was driving prior but not anymore. [facility] in the past.    ADLs/IADLs: Pt early in ARU stay. Pt making slow but steady progress with ADLs. Pt is limited by impaired balance, posterior leaning, and impaired cognition. Pt requires SBA with UBD and min A with LBD tasks  with walker. Pt requires CGA-min A with toileting with walker. Pt requires CGA with G/H tasks standing with walker and set-up seated. Will complete cognitive assessment and further assess safety with IADLs to determine level of assist upon discharge. HC OT services upon discharge.     Mobility: Pt is early in ARU stay. Currently min-A for transfers and gait with FWW up 50'. Has yet to perform stairs. [place]ra 20/56 indicates possible supervision required at discharge pending progress. Pt is a caregiver for his spouse, so ability to provide these cares will be elevated during stay. Goals for household mod-I mobility. Equipment TBD.     Cognition/Language: WFL per CLQT, further investigation to be conducted. Subjectively noting decreased processing speed. Pt was primary caregiver for spouse at baseline. Ongoing SLP at discharge pending progress.    Community Re-Entry: Currently home bound, re-entry pending progress    Transportation: Pt not a , family able to provide    Decision maker: self    Plan of Care and goals reviewed and updated.    Discharge Plan/Recommendations    Fall Precautions: continue    Patient/Family input to goals: yes    Estimated length of stay: 18 days     Overall plan for the patient: reach a level of mod I       Utilization Review and Continued Stay Justification    Medical Necessity Criteria:    For any criteria that is not met, please document reason and plan for discharge, transfer, or modification of plan of care to address.    Requires intensive rehabilitation program to treat functional deficits?: Yes    Requires 3x per week or greater involvement of rehabilitation physician to oversee rehabilitation program?: Yes    Requires rehabilitation nursing interventions?: Yes    Patient is making functional progress?: Yes    There is a potential for additional functional progress? Yes    Patient is participating in therapy 3 hours per day a minimum of 5 days per week or 15 hours per week in   day period?:Yes    Has discharge needs that require coordinated discharge planning approach?:Yes      Barriers/Concerns related to meeting medical necessity criteria:  none    Team Plan to Address Concern:  As needed      Final Physician Sign off    Statement of Approval:   Charlie Paez, DO      Patient Goals  Social Work Goals: Confirm discharge recommendations with therapy, coordinate safe discharge plan and remain available to support and assist as needed.    OT Predicted Duration/Target Date for Goal Attainment: 01/12/24  Therapy Frequency (OT): 6 times/week  OT: Hygiene/Grooming: modified independent  OT: Upper Body Dressing: Modified independent  OT: Lower Body Dressing: Modified independent  OT: Upper Body Bathing: Modified independent  OT: Lower Body Bathing: Modified independent  OT: Bed Mobility: Modified independent  OT: Transfer: Modified independent (shower)  OT: Toilet Transfer/Toileting: Modified independent  OT: Meal Preparation: Modified independent  OT: Home Management: Modified independent  OT: Cognitive: Patient/caregiver will verbalize understanding of cognitive assessment results/recommendations as needed for safe discharge planning     PT Predicted Duration/Target Date for Goal Attainment: 01/12/24  PT Frequency: 6x/week  PT: Bed Mobility: Modified independent, Supine to/from sit, Rolling  PT: Transfers: Modified independent, Bed to/from chair, Sit to/from stand  PT: Gait: Modified independent, Rolling walker, 150 feet  PT: Stairs: 3 stairs, Modified independent, Rail on right  PT: Goal 1: Pt to be mod indep with a medically appropriate HEP tolerating 30-60 min 3-5 x a week with a focus on standing balance, control  PT: Goal 2: Pt to be sba to mod I with advanced car transfer                      SLP Predicted Duration/Target Date for Goal Attainment: 01/06/24  Therapy Frequency (SLP Eval): 6 times/week  SLP: Safely tolerate diet without signs/symptoms of aspiration: Regular diet, Thin  liquids, With use of swallow precautions  SLP: Goal 1: Pt will demonstrate the ability to manage medications and finances x 100% accuracy with occasional cues.              Nursing Goals  Bowel and Bladder care  Fall prevention   Medication Education  Skin Care protection

## 2024-01-02 NOTE — PROGRESS NOTES
[facility]    Medicine Progress Note - Hospitalist Service    Date of Admission:  12/29/2023    Assessment & Plan   Tc Garcia is a 83 year old male with a history of paroxysmal atrial fibrillation on Eliquis, pulmonary hypertension, diabetes mellitus type I, who was initially admitted to [facility] 12/11/2023-12/27 for intracranial hemorrhage after fall. Patient was transferred to [facility] ARU for further rehabilitation, but was noted to have significant hyperglycemia in the setting of likely pump failure, thus was sent back to [facility] 12/28- 12/29. Endocrinology was involved and assisted with insulin regimen. Patient stable for transfer back to the ARU 12/29. Medicine was consulted for assistance with blood sugar management, endo also following.      Updates today 1/2/24:   -plans for transition back to insulin pump today.   -internal medicine will sign off.     Hyperglycemia, improving  Ketosis, resolved  Diabetes Mellitus Type I  Insulin pump initially held at [facility] due to intraparenchymal hematoma.  Was restarted on 12/23/2023 but was found to be in DKA the following day.  DKA did resolve by 12/25/2023.  He was transitioned to Lantus during his hospitalization, and then discharged to acute rehab on his insulin pump. Arrived to ARU hyperglycemic and subsequently transferred to the ED. Found to have ketones, but no evidence of DKA. Etiology of hyperglycemia was likely pump failure due to malpositioning of pump. Endocrinology was consulted and restarted subcutaneous insulin regimen. Patient was transferred back to the ARU and endocrinology planning on following closely and likely will restart insulin pump 1/2.   - Endocrinology following  - Insulin regimen for now until pump restarted  - Lantus 10 units every morning  - Carb coverage of 1 per 7 grams  - Medium intensity correction scale      Intraparenchymal hematoma   H/o spontaneous intracranial hemorrhage  (1/2023)  paroxysmal atrial fibrillation (on eliquis prior to admission)  Presented with dizziness diplopia and nausea to [facility] on 12/11. Found to have intraparenchymal hematoma on imaging. Etiology for intraparenchymal hematoma likely hypertensive and in the setting of chronic anticoagulation with Eliquis versus traumatic after sudden onset of dizziness and fall. Anticoagulation was reversed with Kcentra in ED and he was admitted to the ICU for close monitoring. Neurosurgery was consulted, but patient did not wish for any surgical intervention, thus signed off.   - Follow up with cardiology in one month  - Follow up with stroke neurology in 6-8 weeks      UTI due to Klebsiella pneumoniae  Possible aspiration pneumonia.  Noted to have agitation and confusion on 12/12 &12/13. Work-up with leukocytosis and concern for UTI on UA and aspiration pneumonia on CXR. Urine culture positive for Klebsiella pneumonia. Start on ceftriaxone, subsequently switched to cefuroxime to complete course. Noted to have lactic acidosis again on 12/24, restarted on broad spectrum antibiotics and eventually discharged on IV ceftriaxone (will complete 12/31).  - Continue IV ceftriaxone for now (complete 12/31).     Chronic diastolic CHF  Atrial fibrillation PTA on Eliquis.  Hypertension.  Hyperlipidemia.  Prior to admission Eliquis on hold since admission to [facility]. Most recent echocardiogram with EF of 60 to 65%.  Severe biatrial enlargement.  Mild to moderate TR. Telemetry  with atrial fibrillation.  - Continue PTA carvedilol  - Continue Avapro   - Hold PTA Demadex, cardiology recently advised changing to as needed dosing  - Follow-up with cardiology after discharge, discuss watchman procedure      Stable/Resolved/Chronic Medical Conditions:  CKD stage III Baseline creatinine ~1.5. Avoid nephrotoxins.  Chronic C7 compression fracture Seen by neurosurgery on 12/12 who stated that fracture likely healed and given that x-ray  showed no dynamic instability, no surgical intervention or C collar needed.          Diet: Room Service  Combination Diet Moderate Consistent Carb (60 g CHO per Meal) Diet; Regular Diet; Thin Liquids (level 0)    DVT Prophylaxis: Enoxaparin (Lovenox) SQ  Pruett Catheter: Not present  Lines: None     Cardiac Monitoring: None  Code Status: No CPR- Do NOT Intubate      Clinically Significant Risk Factors              # Hypoalbuminemia: Lowest albumin = 3.4 g/dL at 12/29/2023  5:33 AM, will monitor as appropriate     # Hypertension: Noted on problem list  # Chronic heart failure with preserved ejection fraction: heart failure noted on problem list and last echo with EF >50%      # DMII: A1C = 8.1 % (Ref range: <5.7 %) within past 6 months       # Financial/Environmental Concerns:           Disposition Plan           The patient's care was discussed with the Bedside Nurse and Patient.    Ella Ibarra PA-C  Hospitalist Service  [facility]  Securely message with [employer] (more info)  Text page via [employer] Paging/Directory   ______________________________________________________________________    Interval History   All overnight events reviewed. Patient eating breakfast, no complaints.  Continues to progress with therapies.     Physical Exam   Vital Signs: Temp: 98.1  F (36.7  C) Temp src: Oral BP: 133/68 Pulse: 78   Resp: 16 SpO2: 97 % O2 Device: None (Room air) Oxygen Delivery: 2 LPM  Weight: 141 lbs 0 oz  GENERAL: Alert and oriented x 3. NAD. Ambulatory. Cooperative.   HEENT: Anicteric sclera. Mucous membranes moist. NC. AT.   CV: RRR. S1, S2. No murmurs appreciated.   RESPIRATORY: Effort normal on RA.  Lungs CTAB with no wheezing, rales, rhonchi.   GI: Abdomen soft and non distended with normoactive bowel sounds present in all quadrants. No tenderness.   NEUROLOGICAL: No focal deficits. Moves all extremities.    EXTREMITIES: No peripheral edema.  SKIN: No jaundice. No  naa.      Medical Decision Making       55 MINUTES SPENT BY ME on the date of service doing chart review, history, exam, documentation & further activities per the note.      Data   Recent Labs   Lab 01/02/24  1056 01/02/24  1014 01/02/24  0757 01/02/24  0502 12/30/23  0744 12/30/23  0621 12/29/23  0745 12/29/23  0533 12/28/23  1517 12/28/23  1455 12/28/23  1143 12/28/23  0848   WBC  --   --   --   --   --  11.2*  --  13.2*  --   --   --  11.6*   HGB  --   --   --   --   --  9.5*  --  10.0*  --   --   --  10.4*   MCV  --   --   --   --   --  85  --  87  --   --   --  86   PLT  --  211  --   --   --  234  --  258  --   --   --  260   NA  --   --   --   --   --  138  --  139  --  143  --  139  139   POTASSIUM  --   --   --   --   --  3.7  --  3.9  --  4.7  --  3.6  3.6   CHLORIDE  --   --   --   --   --  102  --  103  --  106  --  101  101   CO2  --   --   --   --   --  27  --  27  --  30*  --  25  25   BUN  --   --   --   --   --  14.7  --  19.7  --  19.7  --  21.4  21.4   CR  --  1.42*  --   --   --  1.41*  --  1.43*  --  1.46*  --  1.56*  1.56*   ANIONGAP  --   --   --   --   --  9  --  9  --  7  --  13  13   LLOYD  --   --   --   --   --  8.3*  --  8.6*  --  8.7*  --  8.9  8.9   *  --  190* 130*   < > 202*   < > 403*   < > 127*   < > 347*  347*   ALBUMIN  --   --   --   --   --   --   --  3.4*  --   --   --  3.4*   PROTTOTAL  --   --   --   --   --   --   --  5.3*  --   --   --  5.2*   BILITOTAL  --   --   --   --   --   --   --  0.3  --   --   --  0.4   ALKPHOS  --   --   --   --   --   --   --  69  --   --   --  70   ALT  --   --   --   --   --   --   --  <5  --   --   --  <5   AST  --   --   --   --   --   --   --  9  --   --   --  9    < > = values in this interval not displayed.

## 2024-01-02 NOTE — PROGRESS NOTES
Brief Diabetes Note:    BG trend following transition to pump stable    Will continue with these settings and trend        OMAR Cohn CNP, BC-ADM  Inpatient Diabetes Management Service  Pager - [phone]  Available on Vocera     To contact Endocrine Diabetes service:   From 7AM-5PM: page inpatient diabetes provider that is following the patient that day (see filed or incomplete progress notes/consult notes).  If uncertain of provider assignment: page job code [phone].  For questions or updates from 5PM-7AM: page the diabetes job code for on call fellow: [phone]    Please notify inpatient diabetes service if changes are planned to steroids, nutrition, or if procedures are planned requiring prolonged NPO status.Diabetes Management Team job code: [phone]

## 2024-01-02 NOTE — PLAN OF CARE
"Goal Outcome Evaluation:    Patient requested for sleeping medication at start of shift, PRN melatonin given. Appeared asleep thereafter. Had reports of \"a little SOB\" though SpO2 was 95% RA, was placed on O2 2L via NC per patient request. No other concerns expressed thus far. BG 0200 , rechecked at 0500 . Continent of bladder using the toilet, Ax1 walker. No BM. Fall precautions maintained, call light in reach, safety checks completed. Please also see rounds notes created.    Continue with POC.     "

## 2024-01-02 NOTE — CONSULTS
Diabetes CNS Consult    Tc Garcia is a 83 year old male with PMH of paroxysmal atrial fibrillation on Eliquis, pulmonary hypertension, diabetes mellitus type I (A1c 8.1% 12/12/2023), who was initially admitted to [facility] 12/11-12/27/2023 for intracranial hemorrhage after fall. Per notes, patient restarted his ambulatory insulin pump 12/23/2023 but was found to be in DKA the following day and resolved by 12/25/2023. Patient was transferred to [facility] ARU for further rehabilitation, on lantus and novolog regimen. Was transitioned back to insulin pump again on 12/27/2023 but was noted to have significant hyperglycemia in the setting of likely pump failure, thus was sent to [facility] 12/28- 12/29 for treatment of DKA. Endocrinology was involved and assisted with insulin regimen. Patient stable for transfer back to the ARU 12/29. Ambulatory insulin pump was resumed morning of 1/2/2023.    Diabetes CNS consulted for insulin pump assessment. Tc uses a Medtronic 630G insulin pump (with Humalog insulin, Quick set infusion set, 3mL reservoir) with a Freestyle Lucho 2 CGM (with reader device) for monitoring. See Marianne Adames CNP, Endocrinology's note on 1/2 for pump settings.     Diet: Room Service  Combination Diet Moderate Consistent Carb (60 g CHO per Meal) Diet; Regular Diet; Thin Liquids (level 0)       Barriers to diabetes management: impaired cognition, vision impairment (adequate with glasses at bedside).    Met with Tc at bedside. Tc transitioned back to his pump this morning with Endo. Per notes, he was able to successfully restart pump without difficulty.     Tc stated that he had just delivered a bolus. Asked if he notified RN to which Tc said he forgotten. Asked Tc to always notify the RN if he delivers a bolus and if he makes any changes to his pump. Per the pump history:   1/3/2023 (I helped Tc to correct the date in the pump settings)  0956: basal resumed  0957: , bolus 0.6  "units  1009: , bolus 2.4 units  1129: , bolus 0.3 units    Asked Tc to show me how he delivers a bolus. He showed me on his pump: goes to bolus wizard, checks his BG via Lucho 2 CGM, inputs current BG, and delivers bolus. Asked Tc if he inputs carbohydrates--he says no. He counts carbs at home and tries to choose low carb options, but he doesn't bolus himself for carbs because \"it makes it go too high.\" Asked Tc to clarify and he says the bolus amount gets too high if he enters carbs. Asked Tc when he does correction boluses--he does corrections after eating and about 4-5 times/day and typically sees his BGs at ~180 mg/dL at home.    Discussed with Tc that staff will continue to do fingersticks even though he has his Lucho on. Discussed that he should also continue to closely monitor his BGs with his Lucho. Despite cognitive impairment, Tc was able to navigate his pump and Lucho reader appropriately. He has adequate supplies of both infusion sets and reservoirs at bedside for the next few weeks.     All questions answered. Asked Tc to notify RN immediately if he notices any issues with his pump, if he is consistently hyperglycemic despite boluses, or if he is hypoglycemic. Discussed with AUGUSTIN Mabry and Marianne Adames CNP, Endocrinology.    Addendum: Marianne Adames circled back to see Tc again after discussion about his bolusing habits. Tc has lack of knowledge of carb and correction bolusing. Plan to remove pump and transition to subcutaneous MDI. See Marianne Adames's note 1/2/2023 6:02 PM.      Duane Mcdonald DNP, [facility]  Diabetes Educator  Pager: [phone]  Office: [phone]    " normal...

## 2024-01-02 NOTE — PROGRESS NOTES
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 Mod A with footwear.    Bathing: Recommend tub bench-pt declined initial shower  Toileting: CGA/min A with walker  IADLs: further assessment  Vision/Cognition: further assessment     Assessment: Focused on morning ADL routine. Pt demonstrating posterior leaning with dynamic standing tasks requiring min A to maintain balance. Will complete cognitive assessment next session. Pt would benefit from MAP prior to discharge.      Other Barriers to Discharge (DME, Family Training, etc): Pt reports having shower chair at home.   Family training prior to discharge

## 2024-01-02 NOTE — PROGRESS NOTES
Team rounds this morning. Discharge Saturday, 1/13. Concerns with cognition brought up. Pt has had 3 hospitalizations in the last year due to falls. 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 Neuropsych scheduled for 1/3.     Hand off from weekend SW that pt requested a [place] referral - SW placed. SW completed a [place] St[place] referral as well.    JERE Escobedo  Post Acute Float   ARU/S[facility]ON/[facility]N    Phone: [phone]  Fax: [phone]

## 2024-01-02 NOTE — DISCHARGE INSTRUCTIONS
Cardiology  You are scheduled to see KAMILLA Mejía on 1/29/2024 at 1:10pm.    Address [address]    [address]    [place] [address]    Phone   [phone]    Primary Care Provider  You are scheduled to see Dr. Cordoba on 2/1/2024 at 10:00am.    Address [address]   [address]   [place] [address]     Phone   [phone]     Stroke Neurology  You are scheduled to see Kaya Brenner on 2/2/2024 at 3:00pm.     Address [address]    [address]    [place] [address]    Phone   [phone]    Endocrinology- labile type 1 DM recently removed from insulin pump   You are scheduled to see Garrett LIZ on 2/01/2024 at 8:00am.     Address [address]    [address]    [place] [address]    Phone   [phone]     Home Health Care:   [facility]   PH: [phone]  F: [phone] or F: [phone]  Nurse, physical therapy, occupational therapy, speech therapy, home health aide, and      [place] [employer]  www.strokemn.org     [employer] is a free, two-year telephone support program provides education and connection to supports and services to assist people throughout [place] in navigating life after brain injury. Participants receive scheduled calls over a two-year period to help problem-solve issues and identify resources to help them transition back to family life, work, school, and the community while achieving the greatest level of independence as possible. Individuals can be referred by a professional or self-refer at any time.    Types of services that Stroke Resource Facilitation offers include:  Emotional support in coping with a  new normal   Finding mental health support and counselors who understand brain injury and stroke  Finding a support group  Finding or re-connecting to rehabilitation services  Finding where and how to get a brain injury assessed  Finding or re-connecting with a primary care physician  Supporting caregivers by connecting them  with resources  Education about diagnosis and symptoms -  Is this normal   Helping educate family and loved ones of the survivor s  invisible  symptoms  Figuring out the logistics of returning to work, vocational rehab and understanding ADA rights  If not going back to work, support in finding meaningful ways to structure your day and exploring volunteer options  Coaching on navigation the federal, [facility] and [facility] health and disability service system with additional support and conference calls as needed  Help finding appropriate legal support for appealing and navigating Social Security Disability, providing advocacy on the individual s behalf as needed and connecting with cost effective alternatives to  as needed  Supporting parents/students with how to discuss return to school and sports with educators and connecting to a TBI specialist or parent advocate group if needed  Connecting to addiction (alcohol/drug/gambling/smoking) support and treatment services  Support with creative problem solving to life barriers.  *These are just a few examples of common things that Resource Facilitation can help with. They are not limited to what is listed here so if you are curious if they can help, just ask.   Call us at [phone] or [phone].    -------------------------------------------------------------------------------------------------------------------    -------------------------------------------------------------------------------------------------------------------    -------------------------------------------------------------------------------------------------------------------    Insulin Regimen:    Novolog Daily with Breakfast: Dose = 1 units per 8 grams of carbohydat   Novolog Daily with Lunch: Dose = 1 units per 13 grams of carbohydate   Novolog Daily with Supper: Dose = 1 units per 14 grams of carbohydate   Novolog With Snacks:   Dose = Between hours of 0800 - 1600 :1 units per 16 grams of  carbohydate.  Between hours of 1601 - 0759 : 1 unit(s) per 18 g of cho     Novolog Three times daily before Meals:  Correction Scale - 1 unit : 65 mg/dl >150     Do Not give Correction Insulin if Pre-Meal BG less than 150   -215 = 1 unit  -280 = 2 units.  -345 = 3 units.  -410 = 4 units.  -475 = 5 units.  BG >475 = 6 units.  To be given with prandial insulin, and based on pre-meal blood glucose. Administering insulin within 5 minutes of the start of the meal is ideal. Administer insulin no more than 30 minutes after the start of the meal, unless directed otherwise by provider.     Notify provider if glucose greater than or equal to 350 mg/dL after administration of correction dose.       Novolog Before Bed:  Do Not give Bedtime Correction Insulin if BG less than 200  -265 = 1 unit.  -330 = 2 units.  -395 = 3 units.  -460 = 4 units.  BG >460 = 5 units       Glargine: 7 units every day at 6PM

## 2024-01-03 NOTE — PLAN OF CARE
Discharge Planner Post-Acute Rehab SLP:     Discharge Plan: Home, is primary caregiver for wife    Precautions: Fall, alarms    Current Status:  Hearing: WFL  Vision: Glasses  Communication: WFL  Cognition: WNL per CLQT. Subjectively, impaired reasoning, attention, immediate memory. Further assessment to be completed or deferred to neuropsych   Swallow: regular, thin (0) liquid. NO STRAWS     Assessment:  Assistance needed for opening containers but otherwise independent. Tolerated regular textures and thin liquids without overt s/sx of aspiration. Dysphagia related goals met.     Other Barriers to Discharge (Family Training, etc): family training: IADL assist

## 2024-01-03 NOTE — PROGRESS NOTES
[facility]   Acute Rehabilitation Unit    INTERVAL HISTORY  Notes and labs reviewed over past 24 hours. No acute overnight events reported    Patient was seen and examined. He reports no new concerns or complaints. Denies chest pain, shortness of breath, no fever or chills.  Appreciate support from Endocrine team with complex medical course and DM1 management.    ROS: 10 point ROS was assessed and was negative unless otherwise stated in HPI      Functional  SLP:    Hearing: WFL  Vision: Glasses  Communication: WFL  Cognition: WNL per CLQT. Subjectively, impaired reasoning, attention, immediate memory. Further assessment to be completed or deferred to neuropsych   Swallow: regular, thin (0) liquid. NO STRAWS      Assessment:  Pt provided education re: results from assessment and deficits noted subjectively across therapies. SLP educated re: plan for therapy and most likely need for increased IADL support from family pending progress. Pt verbalized understanding and agreeable. Pt reported IND wtih all IADLs at baseline for himself and spouse. He further expressed few instances of family needing to take over this year following hospitalization. Pt completed medication management subtest of SAMSON as task. Pt completed with 70% IND, 100% min cues. Noting significantly delayed processing speed, impaired attention, impaired immediate memory and reasoning. Would recommend assist with medications upon discharge. Neuropsych planning on cognitive assessment        MEDICATIONS  Scheduled meds   amLODIPine  10 mg Oral Daily    carvedilol  12.5 mg Oral BID w/meals    enoxaparin ANTICOAGULANT  40 mg Subcutaneous Q24H    insulin aspart  1-4 Units Subcutaneous TID w/meals    insulin aspart  1-4 Units Subcutaneous 2 times per day    insulin aspart   Subcutaneous TID w/meals    insulin glargine  6 Units Subcutaneous Q24H    irbesartan  300 mg Oral At Bedtime    isosorbide mononitrate  60 mg Oral  "QPM    sodium chloride (PF)  3 mL Intracatheter Q8H    tamsulosin  0.4 mg Oral QAM       PRN meds:  acetaminophen, glucose **OR** dextrose **OR** glucagon, insulin aspart, melatonin, - MEDICATION INSTRUCTIONS -, senna-docusate      PHYSICAL EXAM  BP (!) 141/64 (BP Location: Left arm, Patient Position: Semi-Bain's, Cuff Size: Adult Regular)   Pulse 65   Temp 97.6  F (36.4  C) (Oral)   Resp 18   Ht 1.753 m (5' 9\")   Wt 64 kg (141 lb)   SpO2 98%   BMI 20.82 kg/m    General: in no acute distress, conversational and alert, resting comfortably in bed  HEENT: atraumatic, nares clear, conjunctiva white  Pulmonary: on room air, lungs clear to auscultation bilaterally  Cardiovascular: RRR, no murmur auscultated, well-perfused peripherally  Abdominal: soft, non-tender to palpation, non-distended  Extremities: warm peripherally, no edema in BLEs  Skin: warm, dry, without erythema, ecchymosis, or rash noted  MSK: Moves all four extremities volitionally and against gravity.   Neuro: conjugate gaze, speech non-dysarthric,and comprehensible       LABS  Results for orders placed or performed during the hospital encounter of 12/29/23 (from the past 24 hour(s))   Glucose by meter   Result Value Ref Range    GLUCOSE BY METER POCT 194 (H) 70 - 99 mg/dL   Glucose by meter   Result Value Ref Range    GLUCOSE BY METER POCT 79 70 - 99 mg/dL   Glucose by meter   Result Value Ref Range    GLUCOSE BY METER POCT 71 70 - 99 mg/dL   Glucose by meter   Result Value Ref Range    GLUCOSE BY METER POCT 81 70 - 99 mg/dL   Glucose by meter   Result Value Ref Range    GLUCOSE BY METER POCT 99 70 - 99 mg/dL   Glucose by meter   Result Value Ref Range    GLUCOSE BY METER POCT 205 (H) 70 - 99 mg/dL   Glucose by meter   Result Value Ref Range    GLUCOSE BY METER POCT 126 (H) 70 - 99 mg/dL   Glucose by meter   Result Value Ref Range    GLUCOSE BY METER POCT 243 (H) 70 - 99 mg/dL       ASSESSMENT AND PLAN    Tc Garcia is a 83 year old right hand " dominant male with a PMH of DM2 with DKA, paroxysmal atrial fibrillation formerly on apixaban, hypertension, hyperlipidemia, pelural effusion, CKD stage 3, BPH, and ACD who sustained a left cerebellar intracranial hemorrhage from a fall on 12/11/23 with a possible left medial frontal lobe infarct on imaging.  His deficits include decreased visual acuity and mild left hemiparesis. He was admitted to acute inpatient rehabilitation on 12/27/23.     Impairment group code: 02.22 Traumatic, Closed Injury; Fall with resulting intracranial hemorrhage      PT, OT and SLP 60 minutes of each on a daily basis, in addition to rehab nursing and close management of physiatrist.      Impairment of ADL's: OT for 60 min daily to work on upper and lower body self care, dressing, toileting, bathing, energy conservation techniques with use of ADs as needed.     Impairment of mobility:  PT for 60 min daily to work on gait exercises, strengthening, endurance buildup, transfers with use of walker as needed.     Impairment of cognition/language/swallow:  SLP for 60 min daily for cognitive evaluation and treatment strategies for higher level cognitive deficits and memory impairment.     Rehab RN to administer medication, patient education on medication taking, VS monitoring, bowel regimen, glucose monitoring and wound care/surgical wound dressing changes and monitoring.         Medical Conditions    #Traumatic brain injury  #Intraparenchymal hematoma    #Mild hydrocephalus   #H/o spontaneous intracranial hemorrhage (1/2023)  paroxysmal atrial fibrillation (on eliquis prior to admission)  Presented with dizziness diplopia and nausea. Workup found 1.5 X 3 cm intraparenchymal hematoma centered in inferior cerebellar vermis with likely extension into fourth ventricle; no hydrocephalus. Etiology ruled likely hypertensive in setting of chronic anticoagulation after fall.    - follow up with stroke neurology 6-8 weeks from 12/27  --Had left hand  weakness on 12/31 and stat head CT showed no new intracranial abnormalities  -Continue PT, OT, SLP  -Will undergo full neuropsych testing this week due to concern of cognitive status/ability.      #Chronic C7 compression fracture  No dynamic instability.       #Chronic diastolic CHF  #Atrial fibrillation PTA on Eliquis  #Hypertension  #Hyperlipidemia  Previously on apixaban, held after ICH. Anticoagulation was reversed and held. Echocardiogram with EF of 60 to 65%.  Severe biatrial enlargement.  Mild to moderate TR.  - lovenox ppx  - stroke neurology in 6-8 weeks  - BP goal systolic 130-150  - continue amlodipine  - continue carvedilol  - continue avapro  - hold PTA demadex  - follow up with cardiology in 1 month to discuss Watchman       #Diabetic ketoacidosis  #Diabetes mellitus type II HgbA1c 8.1% 12/12/23  Patient's insulin pump had been on hold since admission in the setting of IPH. Blood sugars labile during hospital stay. Patient went into DKA on 12/24/2023.  Likely triggered by infection with rise in the WBC count hence pump discontinued and patient switched to insulin drip per DKA protocol. DKA resolved on 12/25/2023, but then he had another episode of likely DKA on morning of 12/28/23 which may have been due to patient confusion about use of pump / injectable insulin  - Endo consulted and following. 1/02, restarted PTA insulin pump.   --If trending >250 mg/dL longer than 4 hours, please draw appropriate labs to determine if DKA and treat accordingly.    - hypoglycemia protocol  - education needs : formal pump assessment/support for restart. Consult ordered  - no orders have been written for insulin pump now for clarity  NEW PLAN as of 1/3:  Adjust plan as follows:        -  Discontinue Amp pump - writer removed pump     -  Lantus 6 unit(s) now 1830 ordered stat    -  Novolog 1 unit(s) per 16 g cho TID AC/snacks.  Cover all intake.    -  Novolog custom 1/65 TID AC/HS and 0200    -  BG TID AC/HS and 0200     -  Ok to wear Dexcom/Lucho - nursing must continue to do POC BG monitoring as ordered.  CGM not to be used for decision-making - per hospital policy.       -  All other orders as previous written     -  ** patient has T1DM - requires insulin, basal dose must not to be withheld entirely OR for prolonged periods, high risk for DKA**  If trending >250 mg/dL longer than 4 hours, please draw appropriate labs to determine if DKA and treat accordingly.    If patient requires intervention or adjustments for hypoglycemia or prolonged NPO, please follow hypoglycemia protocol and/or contact our service to update - if after hours, contact primary team for additional direction.        #Possible aspiration pneumonia.  Agitation, leukocytosis on 12/12 to 12/13. No growth blood cultures.  CXR R > L lower lung opacity.  Completed IV ceftriaxone - cefuroxime course.  - CXR in ~ 4 weeks (~1/27/24)  - Incentive spirometry  - aspiration precautions     #Presumed sepsis likely due to UTI  Patient went into DKA with rising white count and mildly positive UA.  Of note, patient had just completed treatment for blood pneumonia UTI, question if this was not completely treated.  Urine culture with no growth. Started on vanc/zosyn and narrowed to ceftriaxone 12/27/23  - Complete 7 day total course of abx with ceftriaxone to end 1/2/24       #CKD Stage 3  Baseline creatinine 1-1.1  - continue irbesartan  - consider readding torsemide prn for edema     #Anemia of chronic disease  - monitor    Adjustment to disability:  Clinical psychology to eval and treat  Bowel: PRN meds available  Bladder: Continent  DVT Prophylaxis: Lovenox  GI Prophylaxis: On regular diet, will assess for GI symptoms  Code: DNR/DNI  Disposition: Home vs. Alternate level of care.  ELOS:  1/13.  Follow up Appointments on Discharge:   -Primary care provider, Jessika Cordoba in 1-2 weeks from discharge  Stroke neurology in 6-8 weeks from 12/27/23  cardiology in 1 month  from 12/27/23  CXR in ~ 1/27/24        Charlie Paez,   Physical Medicine and Rehabilitation-[facility]        I spent a total of 25 minutes face to face and coordinating care of Tc Garcia. Over 50% of my time on the unit was spent counseling the patient and /or coordinating care regarding post fall with ICH.

## 2024-01-03 NOTE — PLAN OF CARE
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 Most prominent difficulty with R stance phase      Other Barriers to Discharge (DME, Family Training, etc):   Caregiver role for his spouse  Cognition

## 2024-01-03 NOTE — PLAN OF CARE
Goal Outcome Evaluation:             Outcome Evaluation: pt Alert and oriented x4 . Able to make needs known. Assist x1 with walker and gait belt.Continent of bowel and bladder.  LBM 1/1. Patient insulin pump on hold as of 1/2.  blood glucose this morning was 243 and  182  and lunch time. Insulin given per order. Denied pain/discomfort during the shift.

## 2024-01-03 NOTE — PROGRESS NOTES
"Brief Diabetes Note:    Tc now trending < target    Review of note and discussion from educator and did [place] back with patient following conversation re: \"bolusing after eating and not convering carbs or counting carbs\" but rather doing correction AFTER eating.    ** See now there is a documented 12 unit(s) at 1217 and subsequent trend of BG < target.   Causing for more concern.  Return to bedside to reassess**.     This evening he is not able to explain as he was this earlier this AM  He cannot manipulate pump nor the settings  He is unable to articulate why he gave himself 12 unit(s) earlier \"I am not sure really, I guess I just started to push up and then got carried away maybe?\"  He was trying to use his Freestyle Lucho to \"carb wizard\" which is grossly inappropriate and a significant/drastic change from this AM when he was very clearly going through the process of each phase of his pump, settings, set up etc without issue.     Pump removed and Lantus ordered and administered without hesitation - appreciate nursing.   He is cognitively not able to manage this evening and is not safe.    All orders now updated for staff to resume MDI until further cognitive testing.  Nursing reports does have tendency to worsen intermittently   Much weaker and very notably fatigued. RN shares neuropsych to eval.       6:58 PM  RN shares the lantus pen from this AM cannot be used and needs a new one sent up from pharm.     Adjust plan as follows:       -  Discontinue Amp pump - writer removed pump     -  Lantus 6 unit(s) now 1830 ordered stat    -  Novolog 1 unit(s) per 16 g cho TID AC/snacks.  Cover all intake.    -  Novolog custom 1/65 TID AC/HS and 0200    -  BG TID AC/HS and 0200    -  Ok to wear Dexcom/Lucho - nursing must continue to do POC BG monitoring as ordered.  CGM not to be used for decision-making - per hospital policy.       -  All other orders as previous written     -  ** patient has T1DM - requires insulin, " basal dose must not to be withheld entirely OR for prolonged periods, high risk for DKA**  If trending >250 mg/dL longer than 4 hours, please draw appropriate labs to determine if DKA and treat accordingly.    If patient requires intervention or adjustments for hypoglycemia or prolonged NPO, please follow hypoglycemia protocol and/or contact our service to update - if after hours, contact primary team for additional direction.     OMAR Cohn CNP, BC-ADM  Inpatient Diabetes Management Service  Pager - [phone]  Available on [employer]     To contact Endocrine Diabetes service:   From 7AM-5PM: page inpatient diabetes provider that is following the patient that day (see filed or incomplete progress notes/consult notes).  If uncertain of provider assignment: page job code [phone].  For questions or updates from 5PM-7AM: page the diabetes job code for on call fellow: [phone]    Please notify inpatient diabetes service if changes are planned to steroids, nutrition, or if procedures are planned requiring prolonged NPO status.Diabetes Management Team job code: [phone]

## 2024-01-03 NOTE — PROGRESS NOTES
Brief Diabetes Note:    Please see full note from earlier this AM.    Following trends, will reduce planned Lantus back to 6 unit(s) at 1800   Reduce Novolog  ICR to 1 unit(s) per 20 g cho starting at dinner  Novolog ISF to remain same custom 1/65 TIDAC/HS and likely to remove 0200 tomorrow.      Appears to have had 2 PT sessions which could be contributing to lower trends today.   Continuing to monitor closely.      OMAR Cohn CNP, BC-ADM  Inpatient Diabetes Management Service  Pager - [phone]  Available on [employer]     To contact Endocrine Diabetes service:   From 7AM-5PM: page inpatient diabetes provider that is following the patient that day (see filed or incomplete progress notes/consult notes).  If uncertain of provider assignment: page job code [phone].  For questions or updates from 5PM-7AM: page the diabetes job code for on call fellow: [phone]    Please notify inpatient diabetes service if changes are planned to steroids, nutrition, or if procedures are planned requiring prolonged NPO status.Diabetes Management Team job code: [phone]

## 2024-01-03 NOTE — PROGRESS NOTES
Diabetes Consult Daily  Progress Note          Assessment/Plan:     HPI:  Tc Garcia is a 83 year old right hand dominant male with a relevant PMH of DM1 on insulin pump (history of DKA), paroxysmal atrial fibrillation on eliquis, HTN, HLD, CKD stage 3, recurrent pleural effusion, and history of spontaneous intracranial hemorrhage who sustained a left intraparenchymal hematoma after a fall on 12/11/23, and found to have left medial frontal lobe diffusion restriction with sequelae of  left sided weakness, lower extremity more than upper extremity, as well as increased blurriness of vision.    Assessment:        Type 1 Diabetes Mellitus who presented in DKA (resolved) with diabetes c/b nephropathy, neuropathy who is managed outpatient on [place] amb pump and Lucho CGM. A1c 8.1% (less stringent control for elderly patients with sig medical complexities for safety)   2.   Labile BG  3.   Spontaneous intracranial hemorrhage; L sided weakness and blurred vision; improved    Plan:        -  Lantus 7 unit(s) q24 hours at 1800 hrs    -  Novolog 1 unit(s) per 16 g cho TID AC/snacks.  Cover all intake.    -  Novolog custom 1/65 TID AC/HS and 0200    -  BG TID AC/HS and 0200    -  Ok to wear Dexcom/Lucho - nursing must continue to do POC BG monitoring as ordered.  CGM not to be used for decision-making - per hospital policy.  - hypoglycemia protocol  - education needs : formal pump assessment/support for restart. Consult ordered  - Outpatient diabetes follow up: Dr Asif at [facility] clinic of Endocrinology  -  Diabetes service will continue to follow, please do not hesitate to contact the team with any questions/concerns. First call after hours is to primary service.    -  Please notify inpatient diabetes service if changes are planned that will impact glycemia, such as NPO, starting/adjusting steroids, enteral feeding, parenteral feeding, dextrose fluids or procedures.       Plan discussed  "with patient, bedside RN/primary team via this note.         Interval History:     The last 24 hours progress and nursing notes reviewed.      BG trend:   labile, many factors.     See several notes from 1/2/2024.  Ultimately re-assessed again later in the day and was unsafe to use pump, it was removed and he resumed basal/bolus.     No BG this AM -->  Did eat at 0300 - cho coverage given.   No true fasting BG obtained.   Pre-lunch is 182 mg/dL        This AM, Tc is feeling ok with plan of having pump off for safety for now.   Didn't have much to eat this AM, only some tea and some oatmeal he thinks and was out doing therapy.  Feeling hungry for lunch.   Denies other pain or focal concerns.   Does want to keep in touch \"often\" he requests.       Planned Procedures/surgeries: none  D5W-containing solutions/medications: none    Recent Labs   Lab 01/03/24  0224 01/02/24  2112 01/02/24  1642 01/02/24  1548 01/02/24  1510 01/02/24  1449   * 205* 99 81 71 79         Nutrition:     Orders Placed This Encounter      Combination Diet Moderate Consistent Carb (60 g CHO per Meal) Diet; Regular Diet; Thin Liquids (level 0)          PTA Regimen:   INSULIN PUMP -  MedLuxodo minimed  Sensitivity factor (midnight to midnight): 60  Bolus (units:gram): 6302-1418 = 1:9, 0095-3629 = 1:7, 0584-9706 = 1:7, 8644-1822 = 1:9, 5337-4985 = 1:13   Basal Rates and Start/End times:   Rate #1 =  0.45 units/hr from 0000 to 0200.   Rate #2 = 0.45 units/hr from 0201 to 0600.   Rate #3 = 0.625 units/hr from 0601 to 0900.   Rate #4 = 0.625 units/hr from 0901 to  1700   Rate #5 = 0.50 units/hr from 1701 to 2359.      Maintain blood glucose level within a specified target range  7461-7852 110-150  0711-2389 100-120  1201-0472 100-140    Active insulin time 4 hours           Review of Systems:   CC: denies all          Medications:   Steroid planning:  none       Physical Exam:   /70 (BP Location: Right arm)   Pulse 73   Temp 98.1  F " "(36.7  C) (Oral)   Resp 18   Ht 1.753 m (5' 9\")   Wt 64 kg (141 lb)   SpO2 95%   BMI 20.82 kg/m      General:   A&O to person, time, place, NAD, pleasant   HEENT: Hearing WNL.   Lungs:  unremarkable, no new cough, no SOB  Psych:   Cooperative, appropriate mood, congruent affect          Data:     Lab Results   Component Value Date    A1C 8.1 12/12/2023    A1C 8.0 04/20/2023    A1C 7.2 01/17/2023    A1C 7.7 11/20/2020    A1C 7.4 07/08/2020    A1C 8.2 04/25/2020    A1C 7.7 10/31/2019    A1C 7.2 06/30/2009        CBC RESULTS:   Recent Labs   Lab Test 01/02/24  1014 12/30/23  0621   WBC  --  11.2*   RBC  --  3.49*   HGB  --  9.5*   HCT  --  29.6*   MCV  --  85   MCH  --  27.2   MCHC  --  32.1   RDW  --  17.9*    234     Recent Labs   Lab Test 01/03/24  1133 01/03/24  0846 01/02/24  1056 01/02/24  1014 12/30/23  0744 12/30/23  0621 12/29/23  0745 12/29/23  0533   NA  --   --   --   --   --  138  --  139   POTASSIUM  --   --   --   --   --  3.7  --  3.9   CHLORIDE  --   --   --   --   --  102  --  103   CO2  --   --   --   --   --  27  --  27   ANIONGAP  --   --   --   --   --  9  --  9   * 243*   < >  --    < > 202*   < > 403*   BUN  --   --   --   --   --  14.7  --  19.7   CR  --   --   --  1.42*  --  1.41*  --  1.43*   LLOYD  --   --   --   --   --  8.3*  --  8.6*    < > = values in this interval not displayed.       Marianne Adames, APRN CNP, BC-[facility]  Inpatient Diabetes Management Service  Pager - [phone]  Available on [employer]oc[employer]     To contact Endocrine Diabetes service:   From 7AM-5PM: page inpatient diabetes provider who is following the patient that day (see filed or incomplete progress notes/consult notes).  If uncertain of provider assignment: page job code [phone]. (To page job code in-house dial 3 stars, [phone] then enter number).  For questions or updates AFTER HOURS from 5PM-7AM: page the diabetes job code for on call fellow: [phone]    Please notify inpatient diabetes service if changes are " planned to steroids, nutrition, or if procedures are planned requiring prolonged NPO status. Diabetes Management Team job code: [phone]       I spent 35 minutes on the date of the encounter doing prep/post-work, chart review, history and exam, documentation and further activities per the note including lab review, multidisciplinary communication, counseling the patient and/or coordinating care regarding acute hyper/hypoglycemic management, as well as discharge management and planning/communication.  See note for details.

## 2024-01-03 NOTE — PLAN OF CARE
Gait speed was .46 meters per second self paced, and .66 m/s quickened  pace, indicating decreased speed and pt at risk for falls (Reference scale: > 1.2 m/sec: independent in all activities and crossing street, 0.8-1.0 m/sec: community ambulator, household activities.  May need intervention to reduce falls risk, 0.6-0.8 m/sec: performs self care, limited community ambulator. May need intervention to reduce falls risk, <0.6 m/sec: dependent in ADLs, household ambulator.  Needs intervention to reduce falls risk)    Norm 1.2 to 1.4 meters/sec for 20-70 year-old  Minimal Detectable Change for preferred gait (PD) = 0.18 meters/sec &   Minimal Detectable Change for fast gait (PD) = 0.25 meters/sec according to Alessandro & Leyla 2008    Assessment: both self paced and quick speed gait indicate pt at increased risk of falls.

## 2024-01-03 NOTE — CONSULTS
"SPIRITUAL HEALTH SERVICES - Consult Note  [facility] ([facility]) Acute Rehab  Referral Source/Reason for Visit: routine consult order request    Summary and Recommendations -  Pt understands why he is on Acute Rehab and appears quite motivated, while remaining hopeful he can discharge to home soon.  Pt quite social, enjoys visiting.     Plan: continue to follow while pt on unit.     Jon Mcneil M.Div. (Bill), [facility]  Staff   Pager [phone]      [facility]S available 24/7 for emergent requests/referrals, either by paging the on-call  or by entering an ASAP/STAT consult in [employer], which will also page the on-call .    Assessment    Saw pt Tc Garcia per routine consult order request, for emotional support.    Patient/Family Understanding of Illness and Goals of Care - \"I had a stroke but I'm doing much better. My goal is to get home - I live with my wife (of 64 years) and she (has dementia) so I take care of her...\"     Distress and Loss - pt mentioned the death of his eldest daughter \"two years ago from cancer\".    Strengths, Coping, and Resources - strong support from family was noted. Also, pt showed  good understanding of why he is on Acute Rehab unit and how he can benefit from his rehab stay.     Meaning, Beliefs, and Spirituality - Spiritism; pt welcomes  visits - does not need to be Church .     "

## 2024-01-03 NOTE — PLAN OF CARE
Goal Outcome Evaluation:    Overall Patient Progress: no change    Outcome Evaluation: No change in pt progress this shift    Pt is A/O x4. No impulsive behavior overnight, able to make needs known. Denied pain, SOB, chest pain, or new symptoms. Continent B/B, LBM 1/1. Transfers A1 with walker. BG was 126, correction insulin held per parameters. Consumed 2 packages of marlyn crackers totaling 22g carbs. 1 unit insulin aspart given per order. R-PIV is patent, flushed, and saline-locked. Pt appeared asleep during safety rounds. Call light in reach, bed alarm on. Continue plan of care.

## 2024-01-03 NOTE — PLAN OF CARE
Goal Outcome Evaluation:      Plan of Care Reviewed With: patient    Overall Patient Progress: improvingOverall Patient Progress: improving    Patient is alert and oriented. Able to use a call light appropriately. Assist of x 1 with a walker. Reg diet thin liquids, takes pills whole. Cont of BB,LBM 1/2/24. Is diabetic and was started on a Insulin pump today but patient was not able to control it as expected. Took 12 units of insulin that triggered his insulin down to 71, treatment was started as per facility protocol and Endocrinology was paged. See flowsheet for BG trends.  NP Kaitlynn was here this evening and had to put pump on hold when she realized the patient doesn't understand/demonstrate back how to use it. Lantus and Novolog Pen Insulin was re-started with Lantus 6 uit start order. At HS patient BG was 205 and 1 Unit of insulin was administered. Patient denies pain at this time. Call light within reach. Nursing staff will continue with POC.

## 2024-01-04 NOTE — PROGRESS NOTES
Pt was seen for neuropsychological evaluation at the request of the treatment team for the purposes of diagnostic clarification and treatment planning. 176 minutes of test administration and scoring were provided by this writer. Please see Dr. Maria T Ambriz's report for a full interpretation of the findings.    Catia Hopson  Psychometrist

## 2024-01-04 NOTE — PLAN OF CARE
Goal Outcome Evaluation:    Patient slept throughout the night. No respiratory distress noted. No pain or discomfort reported overnight. BG 0200 AM was 112, then he called at 03:35 AM saying his BG was low, his own machine reads 98. This writer used the hospital machine and result was 101. Patient drank 1 cup apple juice. R PIV patent and flushed as ordered. No new concerns voiced thus far. Comfortable this time. Mixed continent of bladder this shift. No BM. Fall precautions maintained, call light in reach, safety checks completed.      Continue with POC.

## 2024-01-04 NOTE — PROGRESS NOTES
[facility]   Acute Rehabilitation Unit    INTERVAL HISTORY  Notes and labs reviewed over past 24 hours. No acute overnight events reported    Patient was seen and examined. He continues to report poor sleep. He has forgotten to ask for PRN melatonin the past two night, and we discussed scheduling it. He is in agreement. He underwent neuropsych testing yesterday for cognitive impairments, final results pending. Due to cognitive impairments, insulin pump was discontinued by endocrinology and he was placed on Basal Lantus, and carb coverage novolog.     ROS: 10 point ROS was assessed and was negative unless otherwise stated in HPI      Functionally,    With PT: 1/03    Current Status:  Bed Mobility: Supervision  Transfer: CGA/min-A FWW  Gait: CGA/min-A 100' FWW  Stairs: CGA B rail  Balance: Able to stand w/o support - slight posterior lean, braces against objects w/ transfers     Outcome Measures:   Ibarra 1/1 = 20/56     Assessment: Added 3mm heel lift with slight improvement in posterior lean. Most prominent difficulty with R stance phase    With OT: 1/04    Current Status:  ADLs:  Mobility: CGA/min A with walker d/t  posterior leaning  Grooming: CGA standing with walker, set-up seated  Dressing: SBA with UBD seated, min A with LBD tasks with walker. Min A with slippers, dependent with spandi  on BLE's   Bathing: Recommend tub bench-pt declined initial shower  Toileting: CGA/min A with walker  IADLs: Unable to correctly setup 0/4 medications with med box. Anticipate assistance with med mgmt upon discharge.   Vision/Cognition: further assessment     Assessment: Focused on morning ADL routine. Pt's posterior leaning improved with shoes with shoe lift during ADLs. Assessed pt's safety with med mgmt with pt setting up one weeks worth of medications with AM/PM boxes and setting up 0/4 medications correctly. Pt unable to correctly set-up correct time of day, dosages, and unable to  "understand what \"as needed\" means. Would recommend MAP with nsg and assistance with med mgmt upon discharge.    With SLP: 1/03    Current Status:  Hearing: WFL  Vision: Glasses  Communication: WFL  Cognition: WNL per CLQT. Subjectively, impaired reasoning, attention, immediate memory. Further assessment to be completed or deferred to neuropsych   Swallow: regular, thin (0) liquid. NO STRAWS      Assessment:  Assistance needed for opening containers but otherwise independent. Tolerated regular textures and thin liquids without overt s/sx of aspiration. Dysphagia related goals me          MEDICATIONS  Scheduled meds   amLODIPine  10 mg Oral Daily    carvedilol  12.5 mg Oral BID w/meals    enoxaparin ANTICOAGULANT  40 mg Subcutaneous Q24H    insulin aspart  1-3 Units Subcutaneous TID AC    insulin aspart  1-3 Units Subcutaneous At Bedtime    insulin aspart   Subcutaneous TID w/meals    insulin glargine  5 Units Subcutaneous Q24H    irbesartan  300 mg Oral At Bedtime    isosorbide mononitrate  60 mg Oral QPM    sodium chloride (PF)  3 mL Intracatheter Q8H    tamsulosin  0.4 mg Oral QAM       PRN meds:  acetaminophen, glucose **OR** dextrose **OR** glucagon, insulin aspart, melatonin, - MEDICATION INSTRUCTIONS -, senna-docusate      PHYSICAL EXAM  /65 (BP Location: Left arm)   Pulse 56   Temp 97.4  F (36.3  C) (Oral)   Resp 18   Ht 1.753 m (5' 9\")   Wt 64 kg (141 lb)   SpO2 98%   BMI 20.82 kg/m    General: in no acute distress, conversational and alert, resting comfortably in bed  HEENT: atraumatic, nares clear, conjunctiva white  Pulmonary: on room air, lungs clear to auscultation bilaterally  Cardiovascular: RRR, no murmur auscultated, well-perfused peripherally  Abdominal: soft, non-tender to palpation, non-distended  Extremities: warm peripherally, no edema in BLEs  Skin: warm, dry, without erythema, ecchymosis, or rash noted  MSK: Moves all four extremities volitionally and against gravity.   Neuro: " conjugate gaze, speech non-dysarthric,and comprehensible       LABS  Results for orders placed or performed during the hospital encounter of 12/29/23 (from the past 24 hour(s))   Glucose by meter   Result Value Ref Range    GLUCOSE BY METER POCT 182 (H) 70 - 99 mg/dL   Glucose by meter   Result Value Ref Range    GLUCOSE BY METER POCT 138 (H) 70 - 99 mg/dL   Glucose by meter   Result Value Ref Range    GLUCOSE BY METER POCT 99 70 - 99 mg/dL   Glucose by meter   Result Value Ref Range    GLUCOSE BY METER POCT 229 (H) 70 - 99 mg/dL   Glucose by meter   Result Value Ref Range    GLUCOSE BY METER POCT 112 (H) 70 - 99 mg/dL   Glucose by meter   Result Value Ref Range    GLUCOSE BY METER POCT 101 (H) 70 - 99 mg/dL   Platelet count   Result Value Ref Range    Platelet Count 227 150 - 450 10e3/uL   Creatinine   Result Value Ref Range    Creatinine 1.39 (H) 0.67 - 1.17 mg/dL    GFR Estimate 50 (L) >60 mL/min/1.73m2   Glucose by meter   Result Value Ref Range    GLUCOSE BY METER POCT 226 (H) 70 - 99 mg/dL       ASSESSMENT AND PLAN    Tc Garcia is a 83 year old right hand dominant male with a PMH of DM2 with DKA, paroxysmal atrial fibrillation formerly on apixaban, hypertension, hyperlipidemia, pelural effusion, CKD stage 3, BPH, and ACD who sustained a left cerebellar intracranial hemorrhage from a fall on 12/11/23 with a possible left medial frontal lobe infarct on imaging.  His deficits include decreased visual acuity and mild left hemiparesis. He was admitted to acute inpatient rehabilitation on 12/27/23.     Impairment group code: 02.22 Traumatic, Closed Injury; Fall with resulting intracranial hemorrhage        PT, OT and SLP 60 minutes of each on a daily basis, in addition to rehab nursing and close management of physiatrist.       Impairment of ADL's: OT for 60 min daily to work on upper and lower body self care, dressing, toileting, bathing, energy conservation techniques with use of ADs as needed.      Impairment  of mobility:  PT for 60 min daily to work on gait exercises, strengthening, endurance buildup, transfers with use of walker as needed.      Impairment of cognition/language/swallow:  SLP for 60 min daily for cognitive evaluation and treatment strategies for higher level cognitive deficits and memory impairment.      Rehab RN to administer medication, patient education on medication taking, VS monitoring, bowel regimen, glucose monitoring and wound care/surgical wound dressing changes and monitoring.            Medical Conditions     #Traumatic brain injury  #Intraparenchymal hematoma    #Mild hydrocephalus   #H/o spontaneous intracranial hemorrhage (1/2023)  paroxysmal atrial fibrillation (on eliquis prior to admission)  Presented with dizziness diplopia and nausea. Workup found 1.5 X 3 cm intraparenchymal hematoma centered in inferior cerebellar vermis with likely extension into fourth ventricle; no hydrocephalus. Etiology ruled likely hypertensive in setting of chronic anticoagulation after fall.    - follow up with stroke neurology 6-8 weeks from 12/27  --Had left hand weakness on 12/31 and stat head CT showed no new intracranial abnormalities  -Continue PT, OT, SLP  -Will undergo full neuropsych testing this week due to concern of cognitive status/ability.      #Chronic C7 compression fracture  No dynamic instability.        #Chronic diastolic CHF  #Atrial fibrillation PTA on Eliquis  #Hypertension  #Hyperlipidemia  Previously on apixaban, held after ICH. Anticoagulation was reversed and held. Echocardiogram with EF of 60 to 65%.  Severe biatrial enlargement.  Mild to moderate TR.  - lovenox ppx  - stroke neurology in 6-8 weeks  - BP goal systolic 130-150  - continue amlodipine  - continue carvedilol  - continue avapro  - hold PTA demadex  - follow up with cardiology in 1 month to discuss Watchman        #Diabetic ketoacidosis  #Diabetes mellitus type II HgbA1c 8.1% 12/12/23  Patient's insulin pump had been on  hold since admission in the setting of IPH. Blood sugars labile during hospital stay. Patient went into DKA on 12/24/2023.  Likely triggered by infection with rise in the WBC count hence pump discontinued and patient switched to insulin drip per DKA protocol. DKA resolved on 12/25/2023, but then he had another episode of likely DKA on morning of 12/28/23 which may have been due to patient confusion about use of pump / injectable insulin  - Endo consulted and following. 1/02, restarted PTA insulin pump, but due to cognitive impairments to properly and safely administer insulin, giving excess or too little insulin, he was taken off of the insulin pump and placed on basal lantus and carb coverage novolog. As of 1/04 on:   --Lantus 5 units in PM   --Novolog 1unit/25g carbs TID   --Low SSI       #Insomnia  -1/04, will schedule melatonin at night as patient has not remembered to ask for medication while it was PRN. Consider low dose trazodone if melatonin unsuccessful.       #Possible aspiration pneumonia.  Agitation, leukocytosis on 12/12 to 12/13. No growth blood cultures.  CXR R > L lower lung opacity.  Completed IV ceftriaxone - cefuroxime course.  - CXR in ~ 4 weeks (~1/27/24)  - Incentive spirometry  - aspiration precautions     #Presumed sepsis likely due to UTI  Patient went into DKA with rising white count and mildly positive UA.  Of note, patient had just completed treatment for blood pneumonia UTI, question if this was not completely treated.  Urine culture with no growth. Started on vanc/zosyn and narrowed to ceftriaxone 12/27/23  - Completed 7 day total course of abx with ceftriaxone on 1/2/24        #CKD Stage 3  Baseline creatinine 1-1.1  - continue irbesartan  - consider readding torsemide prn for edema     #Anemia of chronic disease  - Continue to assess with Monday and Thursday labs.      Adjustment to disability:  Clinical psychology to eval and treat  Bowel: PRN meds available  Bladder: Continent  DVT  Prophylaxis: Lovenox  GI Prophylaxis: On regular diet, will assess for GI symptoms  Code: DNR/DNI  Disposition: Home vs. Alternate level of care.  ELOS:  1/13.  Follow up Appointments on Discharge:   -Primary care provider, Jessika Cordoba in 1-2 weeks from discharge  Stroke neurology in 6-8 weeks from 12/27/23  cardiology in 1 month from 12/27/23  CXR in ~ 1/27/24  diabetes follow up: Dr Asif at [facility] clinic of Endocrinology       Seen and discussed with Dr. Paez, PM&R staff physician     [place],   PGY2  Physical Medicine and Rehabilitation-[facility]  Pager: [phone]

## 2024-01-04 NOTE — PROGRESS NOTES
Diabetes Consult Daily  Progress Note          Assessment/Plan:     HPI:  Tc Garcia is a 83 year old right hand dominant male with a relevant PMH of DM1 on insulin pump (history of DKA), paroxysmal atrial fibrillation on eliquis, HTN, HLD, CKD stage 3, recurrent pleural effusion, and history of spontaneous intracranial hemorrhage who sustained a left intraparenchymal hematoma after a fall on 12/11/23, and found to have left medial frontal lobe diffusion restriction with sequelae of  left sided weakness, lower extremity more than upper extremity, as well as increased blurriness of vision.    Assessment:        1. Type 1 Diabetes Mellitus who presented in DKA (resolved) with diabetes c/b nephropathy, neuropathy who is managed outpatient on [employer] amb pump and Lucho CGM. A1c 8.1% (less stringent control for elderly patients with sig medical complexities for safety)   2.   Labile BG  3.   Spontaneous intracranial hemorrhage; L sided weakness and blurred vision; improved    Plan:        -  Lantus 5 unit(s) q24 hours at 1800 hrs    -  Novolog 1 unit(s) per 25 g cho TID AC/snacks.  Cover all intake. (If not hitting threshold, may need to switch to echo pen and do 0.5 unit(s) per 12.5 g cho dosing).    -  Novolog low resistance TID AC/HS and 0200     -  BG TID AC/HS and 0200    -  Ok to wear Dexcom/Lucho - nursing must continue to do POC BG monitoring as ordered.  CGM not to be used for decision-making - per hospital policy.  - hypoglycemia protocol  - education needs : formal pump assessment/support for restart. Consult completed and is NOT appropriate for resumption of pump at this time (see notes from 1/2)  - Outpatient diabetes follow up: Dr Asif at [facility] clinic of Endocrinology  -  Diabetes service will continue to follow, please do not hesitate to contact the team with any questions/concerns. First call after hours is to primary service.    -  Please notify inpatient diabetes  service if changes are planned that will impact glycemia, such as NPO, starting/adjusting steroids, enteral feeding, parenteral feeding, dextrose fluids or procedures.       Plan discussed with patient, bedside RN/primary team via this note.         Interval History:     The last 24 hours progress and nursing notes reviewed.      BG trend:   quite labile, many factors.   Overall, continues to require less insulin, eating less and having several PT sessions each day.     ? Fasting BG this AM?  Does have a tendency to snack over the noc.    Tc does confirm that he did have juice and RN (charge) did also report he had juice.     Has consistently been inappropriate to resume pump independently as writer had patient now past 3 days and he has not shown cognitive clarity to the degree he did the AM of 1/2 when pump was resumed nor consistently which would be needed to manage his pump.     Neuropsych consult ordered, not completed.     Long conversation during rounds this AM with Tc and we reviewed why MDI for safety is the recommendation and he understands and agrees.  He does hope to be able to resume his pump at some point if/when that can happen.     Does consistently have good recall of names of providers and care takers.  Eating ok.   Note that he does have an aggressive therapy schedule - he just needs to make time for appropriate meals.         Update/addm:  8:12 PM  Review of trends - escalating.   Appears post prandial BG taken quite close to him having eaten.  Additionally, coverage given hour after eaten  Cho noted at 1200 - coverage given at 1300 and BG check at 1550 - allowing for BG to escalate    Has historically had a far more aggressive ICR to basal but given the lability of BG with lows to <100 mg/dL has needed less insulin.   Challenging to know just how much to add back with timing also needing tightening up.   Goal to continue to work to optimal timing, dosing and less lability.     Appreciate all  "efforts.     Planned Procedures/surgeries: none  D5W-containing solutions/medications: none    Recent Labs   Lab 01/04/24  0339 01/04/24  0205 01/03/24  2107 01/03/24  1610 01/03/24  1440 01/03/24  1133   * 112* 229* 99 138* 182*         Nutrition:     Orders Placed This Encounter      Combination Diet Moderate Consistent Carb (60 g CHO per Meal) Diet; Regular Diet; Thin Liquids (level 0)        PTA Regimen:   INSULIN PUMP -  [employer] minimed  Sensitivity factor (midnight to midnight): 60  Bolus (units:gram): 1981-9219 = 1:9, 0183-0287 = 1:7, 2511-3973 = 1:7, 6285-8211 = 1:9, 9406-3066 = 1:13   Basal Rates and Start/End times:   Rate #1 =  0.45 units/hr from 0000 to 0200.   Rate #2 = 0.45 units/hr from 0201 to 0600.   Rate #3 = 0.625 units/hr from 0601 to 0900.   Rate #4 = 0.625 units/hr from 0901 to  1700   Rate #5 = 0.50 units/hr from 1701 to 2359.      Maintain blood glucose level within a specified target range  1459-3740 110-150  9430-0875 100-120  0463-6272 100-140    Active insulin time 4 hours           Review of Systems:   CC: denies all          Medications:   Steroid planning:  none       Physical Exam:   /66 (BP Location: Right arm, Patient Position: Sitting, Cuff Size: Adult Regular)   Pulse 86   Temp 97.8  F (36.6  C) (Oral)   Resp 18   Ht 1.753 m (5' 9\")   Wt 64 kg (141 lb)   SpO2 98%   BMI 20.82 kg/m      General:   A&O, NAD, pleasant - Lucho is on his L arm - thin man - wearing his glasses   HEENT:  NC/AT. MMM, EOMI, Anicteric. Hearing WNL.   Lungs:  unremarkable, no new cough, no SOB  ABD:   rounded, soft   Extremities:  Moving all extremities - compression socks on - thickened nails - fungal  Skin:  warm and dry, no obvious lesions/rash/bleeding  Neuro:  No focal neurological deficits - waxing/waning cognition   Psych:   Cooperative, appropriate mood, good eye contact, congruent affect          Data:     Lab Results   Component Value Date    A1C 8.1 12/12/2023    A1C 8.0 " 04/20/2023    A1C 7.2 01/17/2023    A1C 7.7 11/20/2020    A1C 7.4 07/08/2020    A1C 8.2 04/25/2020    A1C 7.7 10/31/2019    A1C 7.2 06/30/2009        CBC RESULTS:   Recent Labs   Lab Test 01/04/24  0549 01/02/24  1014 12/30/23  0621   WBC  --   --  11.2*   RBC  --   --  3.49*   HGB  --   --  9.5*   HCT  --   --  29.6*   MCV  --   --  85   MCH  --   --  27.2   MCHC  --   --  32.1   RDW  --   --  17.9*      < > 234    < > = values in this interval not displayed.     Recent Labs   Lab Test 01/04/24  0802 01/04/24  0549 01/04/24  0339 01/02/24  1056 01/02/24  1014 12/30/23  0744 12/30/23  0621 12/29/23  0745 12/29/23  0533   NA  --   --   --   --   --   --  138  --  139   POTASSIUM  --   --   --   --   --   --  3.7  --  3.9   CHLORIDE  --   --   --   --   --   --  102  --  103   CO2  --   --   --   --   --   --  27  --  27   ANIONGAP  --   --   --   --   --   --  9  --  9   *  --  101*   < >  --    < > 202*   < > 403*   BUN  --   --   --   --   --   --  14.7  --  19.7   CR  --  1.39*  --   --  1.42*  --  1.41*  --  1.43*   LLOYD  --   --   --   --   --   --  8.3*  --  8.6*    < > = values in this interval not displayed.       [facility], [facility]  Inpatient Diabetes Management Service  Pager - [phone]  Available on [employer]     To contact Endocrine Diabetes service:   From 7AM-5PM: page inpatient diabetes provider who is following the patient that day (see filed or incomplete progress notes/consult notes).  If uncertain of provider assignment: page job code [phone]. (To page job code in-house dial 3 stars, [phone] then enter number).  For questions or updates AFTER HOURS from 5PM-7AM: page the diabetes job code for on call fellow: [phone]    Please notify inpatient diabetes service if changes are planned to steroids, nutrition, or if procedures are planned requiring prolonged NPO status. Diabetes Management Team job code: [phone]       I spent 50 minutes on the date of the encounter doing  prep/post-work, chart review, history and exam, documentation and further activities per the note including lab review, multidisciplinary communication, counseling the patient and/or coordinating care regarding acute hyper/hypoglycemic management, as well as discharge management and planning/communication.  See note for details.

## 2024-01-04 NOTE — PLAN OF CARE
Discharge Planner Post-Acute Rehab SLP:     Discharge Plan: Home with  SLP. 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 Subjectively, impaired reasoning, attention, immediate memory. Further assessment to be completed or deferred to neuropsych   Swallow: regular, thin (0) liquid. NO STRAWS     Assessment:Upon arrival, pt reported spouse (who he cares for) is not answering calls and requesting time to consult with family to make sure she is ok. SLP returned and pt reported spouse to be accounted for. SLP provided education re: recommending more support at dc for safety and IADL support for him and spouse. Pt verbalized understanding and in agreement. Pt participated in ongoing subtests of SAMSON as task. subtests completed and scores are as follows: telling time 100%IND; solving daily math 80%IND, 100% min cues; counting money 100%IND; reading directions 90%IND, 100% min cues.     Other Barriers to Discharge (Family Training, etc): family training: IADL assist

## 2024-01-04 NOTE — PLAN OF CARE
Discharge Planner Post-Acute Rehab PT:     Discharge Plan: Home with home care - care/assist TBD as pt is caregiver for his spouse    Precautions: Alarms    Current Status:  Bed Mobility: Supervision  Transfer: CGA/min-A FWW  Gait: ' FWW  Stairs: CGA B rail  Balance: Able to stand w/o support - slight posterior lean, braces against objects w/ transfers    Outcome Measures:   Ibarra 1/1 = 20/56    Assessment: Pt continues to struggle to functionally shift weight onto his R leg. Pt demonstrates L lean that can improve some with cueing that quickly returns as patient loses focus.       Other Barriers to Discharge (DME, Family Training, etc):   Caregiver role for his spouse  Cognition

## 2024-01-04 NOTE — PLAN OF CARE
Goal Outcome Evaluation:      Plan of Care Reviewed With: patient    Overall Patient Progress: improvingOverall Patient Progress: improving    Patient is alert and oriented with some forgetfulness. Able to use a call light appropriately. Assist of x 1 with a walker. Reg diet thin liquids, takes pills whole. Cont of BB, LBM 1/2. Diabetic see flowsheet. Was given Lantus 6 units start order. Ate only 25% of his dinner. Was given gram crackers with peanut butter and 1 apple juice for BG 99. Denies pain at this time. Call light within reach. Nursing staff will continue with POC.

## 2024-01-04 NOTE — PROGRESS NOTES
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 Min A with slippers, dependent with spandi  on BLE's   Bathing: Recommend tub bench-pt declined initial shower  Toileting: CGA/min A with walker  IADLs: Unable to correctly setup 0/4 medications with med box. Anticipate assistance with med mgmt upon discharge.   Vision/Cognition: further assessment     Assessment: Focused on morning ADL routine. Pt's posterior leaning improved with shoes with shoe lift during ADLs. Assessed pt's safety with med mgmt with pt setting up one weeks worth of medications with AM/PM boxes and setting up 0/4 medications correctly. Pt unable to correctly set-up correct time of day, dosages, and unable to understand what \"as needed\" means. Would recommend MAP with nsg and assistance with med mgmt upon discharge.     Other Barriers to Discharge (DME, Family Training, etc): Pt reports having shower chair at home.   Family training prior to discharge  "

## 2024-01-04 NOTE — PROGRESS NOTES
Alert and oriented times four. Able to use call light and make needs known. Continent of bowel and bladder. Last BM 1/4. Assist of one with walker and gait belt. Right PIV saline locked. Takes pills whole. No straws. No pain or discomfort.  and . Sliding scale and carb count insulin. Carb count: 50 and 45. Calm and cooperative.    Patient's most recent vital signs are:     Vital signs:  BP: 122/65  Temp: 97.4  HR: 56  RR: 18  SpO2: 98 %     Patient does not have new respiratory symptoms.  Patient does not have new sore throat.  Patient does not have a fever greater than 99.5.

## 2024-01-05 NOTE — PLAN OF CARE
Goal Outcome Evaluation:                 Outcome Evaluation: Pt up in chair for meals . Fall precautions cont. Given insulin as ordered. Encoraged to eat protien supplemenets

## 2024-01-05 NOTE — PLAN OF CARE
Discharge Planner Post-Acute Rehab SLP:     Discharge Plan: Home with  SLP. 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 Subjectively, impaired reasoning, attention, immediate memory. Further assessment to be completed or deferred to neuropsych   Swallow: regular, thin (0) liquid. NO STRAWS     Assessment: Pt reported sleeping very poorly prior night, very tired today. Attempted to follow up with assigned task per SLP plan, pt reported no task was let for him to complete; none seen in pt's room. Pt educated in internal and external memory strategies, visual aide development initiated. Instructed pt in structured practice for use of association internal memory strategy, pt recalled 60% details independently first opportunity, increased to 80% with his cues using his associations. Second opportunity 0% recalled, increased to 60% with his associations as cues. Suspect fatigue impacted pt performance this session.     Other Barriers to Discharge (Family Training, etc): family training: IADL assist

## 2024-01-05 NOTE — PLAN OF CARE
Discharge Planner Post-Acute Rehab PT:     Discharge Plan: Home with home care - care/assist TBD as pt is caregiver for his spouse    Precautions: Alarms    Current Status:  Bed Mobility: Supervision  Transfer: CGA/min-A FWW  Gait: ' FWW  Stairs: CGA B rail  Balance: Able to stand w/o support - slight posterior lean, braces against objects w/ transfers    Outcome Measures:   Ibarra 1/1 = 20/56    Assessment: Pt with good participation and seems to have some improvement in wt shift to RLE, but still needing A for gait at rail and with fww.  Cont toward goals.     Other Barriers to Discharge (DME, Family Training, etc):   Caregiver role for his spouse  Cognition

## 2024-01-05 NOTE — PROGRESS NOTES
"CLINICAL NUTRITION SERVICES - ASSESSMENT NOTE     Nutrition Prescription    RECOMMENDATIONS FOR MDs/PROVIDERS TO ORDER:  Recommend daily MVI w/ minerals to help meet micronutrient needs given poor oral intake    Malnutrition Status:    Severe malnutrition in the context of acute on chronic illness.     Recommendations already ordered by Registered Dietitian (RD):  - Glucerna (chocolate) with lunch and dinner  - 2 pkts saltine crackers + 2 slices cheddar cheese (10 g CHO) at 10 am  - Cottage cheese + peaches (17 g CHO) at 2 pm    Future/Additional Recommendations:  Monitor PO intake, snack/supplement use, labs, and weight trends     REASON FOR ASSESSMENT  Tc Garcia is a/an 83 year old male assessed by the dietitian for LOS    PMH  PMH of DM2 with DKA, paroxysmal atrial fibrillation formerly on apixaban, hypertension, hyperlipidemia, pelural effusion, CKD stage 3, BPH, and ACD who sustained a left cerebellar intracranial hemorrhage from a fall on 12/11/23 with a possible left medial frontal lobe infarct on imaging.  His deficits include decreased visual acuity and mild left hemiparesis     NUTRITION HISTORY/FINDINGS  Met with pt at bedside. Pt reports poor appetite and intake since admission. PTA he was eating at baseline. He uses Glucerna at home.     Endocrinology following for diabetes management    CURRENT NUTRITION ORDERS  Diet: Moderate Consistent Carbohydrate    Intake/Tolerance: % per flowsheets since admission, 1 intake documented at 50%  Per [employer], pt ordering 3 meals/day from room service. Ordered 3-day average of 1360 kcal and 67 g protein.     LABS  Labs reviewed  Cr: 1.37 (H)  Hemoglobin A1C: 8.1 (12/12)  Glucose POCT: 226-391 over 24 hours    MEDICATIONS  Medications reviewed  Novolog, 1 unit per 25 g CHO with meals, low intensity sliding scale  Lantus, 5 units every 24 hours  Novolog, PRN with snacks or supplements - 1 unit per 20 g CHO    ANTHROPOMETRICS  Height: 175.3 cm (5' 9\")  Most " Recent Weight: 64 kg (141 lb)    IBW: 72.7 kg  BMI: Normal BMI  Weight History:   Wt Readings from Last 15 Encounters:   12/29/23 64 kg (141 lb)   12/28/23 62.6 kg (138 lb 0.1 oz)   12/27/23 62.6 kg (138 lb)   12/24/23 60.8 kg (134 lb 0.6 oz)   11/20/23 62.6 kg (138 lb)   11/16/23 60.8 kg (134 lb)   10/26/23 63.5 kg (140 lb)   10/14/23 60.8 kg (134 lb)   09/25/23 63.3 kg (139 lb 9.6 oz)   07/05/23 67.4 kg (148 lb 9.6 oz)   06/07/23 67.1 kg (148 lb)   05/26/23 75.5 kg (166 lb 8 oz)   05/02/23 63.6 kg (140 lb 3.2 oz)   03/07/23 69.3 kg (152 lb 12.8 oz)   02/17/23 69.9 kg (154 lb)   Weight stable over past 2 months  5% wt loss in ~6 months, non significant    Dosing Weight: 64 kg (admit wt)    ASSESSED NUTRITION NEEDS  Estimated Energy Needs: 1920+ kcals/day (30+ kcals/kg)  Justification: Repletion  Estimated Protein Needs: 77+ grams protein/day (1.2+ grams of pro/kg)  Justification: Repletion  Estimated Fluid Needs: 1 mL/kcal  Justification: Maintenance    PHYSICAL FINDINGS  See malnutrition section below.    MALNUTRITION  % Intake: < 75% for > 7 days (moderate)  % Weight Loss: Weight loss does not meet criteria  Subcutaneous Fat Loss: Facial region:  moderate to severe  Muscle Loss: Temporal:  severe  Fluid Accumulation/Edema: None noted  Malnutrition Diagnosis: Severe malnutrition in the context of acute on chronic illness.     NUTRITION DIAGNOSIS  Inadequate oral intake related to decreased appetite as evidenced by pt report and moderate to severe muscle and fat loss.       INTERVENTIONS  Implementation  Nutrition Education: Provided education on role of RD in care. Discussed available snacks/supplements to increase intake.    Collaboration with other providers - discussed in team rounds this week  Medical food supplement therapy - Glucerna BID  Modify composition of meals/snacks - food snacks BID between meals    Goals  Patient to consume % of nutritionally adequate meal trays TID, or the equivalent with  supplements/snacks.     Monitoring/Evaluation  Progress toward goals will be monitored and evaluated per protocol.   Naz Johnson RD, MAYE  ARU RD pager: [phone]  Weekend/Holiday RD pager: [phone]

## 2024-01-05 NOTE — PROGRESS NOTES
[facility]   Acute Rehabilitation Unit    INTERVAL HISTORY  Notes and labs reviewed over past 24 hours. Overnight he had elevated BS ranging from 260-300. He was given correction insulin.    Patient was seen and examined. He reports a better night sleep with melatonin scheduled. He has no other acute complaints or concerns. He was enjoying his breakfast upon evaluation.    ROS: 10 point ROS was assessed and was negative unless otherwise stated in HPI      Functionally,    With PT: 1/04    Current Status:  Bed Mobility: Supervision  Transfer: CGA/min-A FWW  Gait: ' FWW  Stairs: CGA B rail  Balance: Able to stand w/o support - slight posterior lean, braces against objects w/ transfers     Outcome Measures:   Ibarra 1/1 = 20/56     Assessment: Added 3mm heel lift with slight improvement in posterior lean. Most prominent difficulty with R stance phase    With OT: 1/05    Current Status:  ADLs:  Mobility: CGA/min A with walker d/t  posterior leaning  Grooming: CGA standing with walker, set-up seated  Dressing: SBA with UBD seated, min A with LBD tasks with walker. Min A with slippers, dependent with spandi  on BLE's   Bathing: Recommend tub bench-pt declined initial shower  Toileting: CGA/min A with walker  IADLs: Unable to correctly setup 0/4 medications with med box. Anticipate assistance with med mgmt upon discharge.   Vision/Cognition: further assessment    With SLP: 1/04    Current Status:  Hearing: WFL  Vision: Glasses  Communication: WFL  Cognition: WNL per CLQT. Subjectively, impaired reasoning, attention, immediate memory. Further assessment to be completed or deferred to neuropsych   Swallow: regular, thin (0) liquid. NO STRAWS           MEDICATIONS  Scheduled meds   amLODIPine  10 mg Oral Daily    carvedilol  12.5 mg Oral BID w/meals    enoxaparin ANTICOAGULANT  40 mg Subcutaneous Q24H    insulin aspart  1-3 Units Subcutaneous TID AC    insulin aspart  1-3 Units  "Subcutaneous 2 times per day    insulin aspart   Subcutaneous TID w/meals    insulin glargine  5 Units Subcutaneous Q24H    irbesartan  300 mg Oral At Bedtime    isosorbide mononitrate  60 mg Oral QPM    melatonin  10 mg Oral At Bedtime    sodium chloride (PF)  3 mL Intracatheter Q8H    tamsulosin  0.4 mg Oral QAM       PRN meds:  acetaminophen, glucose **OR** dextrose **OR** glucagon, insulin aspart, - MEDICATION INSTRUCTIONS -, senna-docusate      PHYSICAL EXAM  /70 (BP Location: Left arm)   Pulse 93   Temp 98.5  F (36.9  C) (Oral)   Resp 16   Ht 1.753 m (5' 9\")   Wt 64 kg (141 lb)   SpO2 93%   BMI 20.82 kg/m    General: in no acute distress, conversational and alert, resting comfortably in bed  HEENT: atraumatic, nares clear, conjunctiva white  Pulmonary: on room air, lungs clear to auscultation bilaterally  Cardiovascular: RRR, no murmur auscultated, well-perfused peripherally  Abdominal: soft, non-tender to palpation, non-distended  Extremities: warm peripherally, no edema in BLEs  Skin: warm, dry, without erythema, ecchymosis, or rash noted  MSK: Moves all four extremities volitionally and against gravity.   Neuro: conjugate gaze, speech non-dysarthric,and comprehensible       LABS  Results for orders placed or performed during the hospital encounter of 12/29/23 (from the past 24 hour(s))   Glucose by meter   Result Value Ref Range    GLUCOSE BY METER POCT 243 (H) 70 - 99 mg/dL   Glucose by meter   Result Value Ref Range    GLUCOSE BY METER POCT 261 (H) 70 - 99 mg/dL   Glucose by meter   Result Value Ref Range    GLUCOSE BY METER POCT 337 (H) 70 - 99 mg/dL   Glucose by meter   Result Value Ref Range    GLUCOSE BY METER POCT 391 (H) 70 - 99 mg/dL   Glucose by meter   Result Value Ref Range    GLUCOSE BY METER POCT 344 (H) 70 - 99 mg/dL   Glucose by meter   Result Value Ref Range    GLUCOSE BY METER POCT 260 (H) 70 - 99 mg/dL   Glucose by meter   Result Value Ref Range    GLUCOSE BY METER POCT 269 " (H) 70 - 99 mg/dL       ASSESSMENT AND PLAN    Tc Garcia is a 83 year old right hand dominant male with a PMH of DM2 with DKA, paroxysmal atrial fibrillation formerly on apixaban, hypertension, hyperlipidemia, pelural effusion, CKD stage 3, BPH, and ACD who sustained a left cerebellar intracranial hemorrhage from a fall on 12/11/23 with a possible left medial frontal lobe infarct on imaging.  His deficits include decreased visual acuity and mild left hemiparesis. He was admitted to acute inpatient rehabilitation on 12/27/23.     Impairment group code: 02.22 Traumatic, Closed Injury; Fall with resulting intracranial hemorrhage        PT, OT and SLP 60 minutes of each on a daily basis, in addition to rehab nursing and close management of physiatrist.       Impairment of ADL's: OT for 60 min daily to work on upper and lower body self care, dressing, toileting, bathing, energy conservation techniques with use of ADs as needed.      Impairment of mobility:  PT for 60 min daily to work on gait exercises, strengthening, endurance buildup, transfers with use of walker as needed.      Impairment of cognition/language/swallow:  SLP for 60 min daily for cognitive evaluation and treatment strategies for higher level cognitive deficits and memory impairment.      Rehab RN to administer medication, patient education on medication taking, VS monitoring, bowel regimen, glucose monitoring and wound care/surgical wound dressing changes and monitoring.            Medical Conditions     #Traumatic brain injury  #Intraparenchymal hematoma    #Mild hydrocephalus   #H/o spontaneous intracranial hemorrhage (1/2023)  paroxysmal atrial fibrillation (on eliquis prior to admission)  Presented with dizziness diplopia and nausea. Workup found 1.5 X 3 cm intraparenchymal hematoma centered in inferior cerebellar vermis with likely extension into fourth ventricle; no hydrocephalus. Etiology ruled likely hypertensive in setting of chronic  anticoagulation after fall.    - follow up with stroke neurology 6-8 weeks from 12/27  --Had left hand weakness on 12/31 and stat head CT showed no new intracranial abnormalities  -Continue PT, OT, SLP  -Will undergo full neuropsych testing this week due to concern of cognitive status/ability.      #Chronic C7 compression fracture  No dynamic instability.        #Chronic diastolic CHF  #Atrial fibrillation PTA on Eliquis  #Hypertension  #Hyperlipidemia  Previously on apixaban, held after ICH. Anticoagulation was reversed and held. Echocardiogram with EF of 60 to 65%.  Severe biatrial enlargement.  Mild to moderate TR.  - lovenox ppx  - stroke neurology in 6-8 weeks  - BP goal systolic 130-150  - continue amlodipine  - continue carvedilol  - continue avapro  - hold PTA demadex  - follow up with cardiology in 1 month to discuss Watchman        #Diabetic ketoacidosis  #Diabetes mellitus type II HgbA1c 8.1% 12/12/23  Patient's insulin pump had been on hold since admission in the setting of IPH. Blood sugars labile during hospital stay. Patient went into DKA on 12/24/2023.  Likely triggered by infection with rise in the WBC count hence pump discontinued and patient switched to insulin drip per DKA protocol. DKA resolved on 12/25/2023, but then he had another episode of likely DKA on morning of 12/28/23 which may have been due to patient confusion about use of pump / injectable insulin  - Endo consulted and following. 1/02, restarted PTA insulin pump, but due to cognitive impairments to properly and safely administer insulin, giving excess or too little insulin, he was taken off of the insulin pump and placed on basal lantus and carb coverage novolog. As of 1/04 on:   --Lantus 5 units in PM   --Novolog 1unit/25g carbs TID   --Low SSI  --1/05, patient had poor sugar control overnight. Anticipate medication adjustments. Follow endocrinology recommendations     #Insomnia  -Continue Scheduled melatonin. Consider low dose  trazadone if patient still having trouble sleeping while on melatonin.       #Possible aspiration pneumonia.  Agitation, leukocytosis on 12/12 to 12/13. No growth blood cultures.  CXR R > L lower lung opacity.  Completed IV ceftriaxone - cefuroxime course.  - CXR in ~ 4 weeks (~1/27/24)  - Incentive spirometry  - aspiration precautions     #Presumed sepsis likely due to UTI  Patient went into DKA with rising white count and mildly positive UA.  Of note, patient had just completed treatment for blood pneumonia UTI, question if this was not completely treated.  Urine culture with no growth. Started on vanc/zosyn and narrowed to ceftriaxone 12/27/23  - Completed 7 day total course of abx with ceftriaxone on 1/2/24        #CKD Stage 3  Baseline creatinine 1-1.1  - continue irbesartan  - consider readding torsemide prn for edema     #Anemia of chronic disease  - Continue to assess with Monday and Thursday labs.      Adjustment to disability:  Clinical psychology to eval and treat  Bowel: PRN meds available  Bladder: Continent  DVT Prophylaxis: Lovenox  GI Prophylaxis: On regular diet, will assess for GI symptoms  Code: DNR/DNI  Disposition: Home vs. Alternate level of care.  ELOS:  1/13.  Follow up Appointments on Discharge:   -Primary care provider, Jessika Cordoba in 1-2 weeks from discharge  Stroke neurology in 6-8 weeks from 12/27/23  cardiology in 1 month from 12/27/23  CXR in ~ 1/27/24  diabetes follow up: Dr Asif at [facility] clinic of Endocrinology       Seen and discussed with Dr. Paez, PM&R staff physician     [place],   PGY2  Physical Medicine and Rehabilitation-[facility]  Pager: [phone]

## 2024-01-05 NOTE — PROGRESS NOTES
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 Min A with slippers, dependent with spandi  on BLE's   Bathing: Recommend tub bench-pt declined initial shower  Toileting: CGA/min A with walker  IADLs: Unable to correctly setup 0/4 medications with med box. Anticipate assistance with med mgmt upon discharge.   Vision/Cognition: further assessment     Assessment: OT: am tx session facilitated ADL tasks, min a to don slippers with back seated EOB, pt CGA w/fww ambulate to bathroom and tsf to toilet, supervision for seated hygeine cares. Mod A with clothing mngt as pt began walking to sink w/fww with shorts and brief around his ankles and had delayed response to safety cues. CGA standing at sink to wash hands. Provided assist to initiate call nurse for BG check prior to breakfast that was set up for pt in room. Pt declined further activity after breakfast citing low back pain and need to lie down.     -30 min breakfast, nsg care, pain    Other Barriers to Discharge (DME, Family Training, etc): Pt reports having shower chair at home.   Family training prior to discharge

## 2024-01-05 NOTE — PLAN OF CARE
Goal Outcome Evaluation:      Plan of Care Reviewed With: patient    Overall Patient Progress: no changeOverall Patient Progress: no change    Outcome Evaluation: Alert and oriented; call light appropriate. Complained of pain on back and buttocks; repositioned patient. Declined warm/cold packs. PRN tylenol given. BG checked at 2am- 260mg/dl, Corrective dose given. Wound cares on buttocks and left wrist done. Patient up to toilet with assist A1 w/ walker and transferbelt. Continent of bladder this shift. Fall precaution in place. Safety checks done and completed.

## 2024-01-05 NOTE — PROGRESS NOTES
Diabetes Consult Daily  Progress Note          Assessment/Plan:     HPI:  Tc Garcia is a 83 year old right hand dominant male with a relevant PMH of DM1 on insulin pump (history of DKA), paroxysmal atrial fibrillation on eliquis, HTN, HLD, CKD stage 3, recurrent pleural effusion, and history of spontaneous intracranial hemorrhage who sustained a left intraparenchymal hematoma after a fall on 12/11/23, and found to have left medial frontal lobe diffusion restriction with sequelae of  left sided weakness, lower extremity more than upper extremity, as well as increased blurriness of vision.    Assessment:   Type 1 Diabetes Mellitus who presented in DKA (resolved) with diabetes c/b nephropathy, neuropathy who is managed outpatient on [employer] amb pump and Lucho CGM. A1c 8.1% (less stringent control for elderly patients with sig medical complexities for safety)   2.   Labile BG    Global hyperglycemia the past 24 hours. Glargine was reduced yesterday due to tight BG control the previous night. Will increase Novolog ICR and correction scale starting at noon today.  Reviewed with patient and RN to cover all carbs consumed at time of consumption.     Plan:       -  Increase Lantus from 5 to 6 unit(s) q24 hours at 1800 hrs    -  Intensify Novolog ICR from 1:25 to 1:20 g cho TID AC/snacks.  Cover all intake. (If not hitting threshold, may need to switch to echo pen and do 0.5 unit(s) per 12.5 g cho dosing).    -  Intensify Novolog correction scale from 1:100 to 1:75 >140 TID AC; 1:75 >200 HS and 0200     -  BG TID AC/HS and 0200    -  Ok to wear Dexcom/Lucho - nursing must continue to do POC BG monitoring as ordered.  CGM not to be used for decision-making - per hospital policy.  - hypoglycemia protocol  - education needs : formal pump assessment/support for restart. Consult completed and is NOT appropriate for resumption of pump at this time (see notes from 1/2)  - Outpatient diabetes  "follow up: Dr Asif at \[facility]\"" clinic of Endocrinology  -  Diabetes service will continue to follow, please do not hesitate to contact the team with any questions/concerns. First call after hours is to primary service.    -  Please notify inpatient diabetes service if changes are planned that will impact glycemia, such as NPO, starting/adjusting steroids, enteral feeding, parenteral feeding, dextrose fluids or procedures.     ADDENDUM 1600: called RN to check BG POC at 1545 and reading was 359 mg/dL. Further intensify Novolog correction scale to 1:50 >140 TID AC; 1:50 >200 HS and 0200 and instructed RN to give pre-dinner correction now at 1600. Will further intensify Novolog ICR to 1:15 starting at dinner and increase Lantus to 7 units daily.    Plan discussed with patient, bedside RN/primary team via this note.         Interval History:   - Patient \"not feeling well today.\" Notes whole body aches and back pain. Feels tired. Worked with PT this morning. Denies nausea or vomiting with eating. Denies snacking overnight. Denies diarrhea or constipation.       Planned Procedures/surgeries: none  D5W-containing solutions/medications: none        Nutrition:     Orders Placed This Encounter      Combination Diet Moderate Consistent Carb (60 g CHO per Meal) Diet; Regular Diet; Thin Liquids (level 0)        PTA Regimen:   INSULIN PUMP -  [employer]tr[employer] minimed  Sensitivity factor (midnight to midnight): 60  Bolus (units:gram): 7728-7334 = 1:9, 8789-3862 = 1:7, 3027-8391 = 1:7, 3902-5317 = 1:9, 0652-2869 = 1:13   Basal Rates and Start/End times:   Rate #1 =  0.45 units/hr from 0000 to 0200.   Rate #2 = 0.45 units/hr from 0201 to 0600.   Rate #3 = 0.625 units/hr from 0601 to 0900.   Rate #4 = 0.625 units/hr from 0901 to  1700   Rate #5 = 0.50 units/hr from 1701 to 2359.      Maintain blood glucose level within a specified target range  2212-1816 110-150  7149-4045 100-120  9227-3597 100-140    Active insulin time 4 hours           " "Review of Systems:   CC: denies all          Medications:   Steroid planning:  none       Physical Exam:   /70 (BP Location: Left arm)   Pulse 93   Temp 98.5  F (36.9  C) (Oral)   Resp 16   Ht 1.753 m (5' 9\")   Wt 64 kg (141 lb)   SpO2 93%   BMI 20.82 kg/m    General:   A&O, NAD, pleasant, resting in bed. Lucho is on his L arm - thin man - wearing his glasses   HEENT:  NC/AT. MMM, EOMI, Anicteric. Hearing WNL.   Lungs:  unremarkable, no new cough, no SOB  ABD:   rounded, soft   Extremities:  Moving all extremities - compression socks on - thickened nails - fungal  Skin:  warm and dry, no obvious lesions/rash/bleeding  Neuro:  No focal neurological deficits - waxing/waning cognition   Psych:   Cooperative, appropriate mood, good eye contact, congruent affect          Data:     Lab Results   Component Value Date    A1C 8.1 12/12/2023    A1C 8.0 04/20/2023    A1C 7.2 01/17/2023    A1C 7.7 11/20/2020    A1C 7.4 07/08/2020    A1C 8.2 04/25/2020    A1C 7.7 10/31/2019    A1C 7.2 06/30/2009        CBC RESULTS:   Recent Labs   Lab Test 01/04/24  0549   WBC 8.2   RBC 3.21*   HGB 8.8*   HCT 27.8*   MCV 87   MCH 27.4   MCHC 31.7   RDW 17.7*        Last Comprehensive Metabolic Panel:  Lab Results   Component Value Date     01/04/2024    POTASSIUM 3.9 01/04/2024    CHLORIDE 101 01/04/2024    CO2 24 01/04/2024    ANIONGAP 12 01/04/2024     (H) 01/05/2024    BUN 13.7 01/04/2024    CR 1.39 (H) 01/04/2024    CR 1.37 (H) 01/04/2024    GFRESTIMATED 50 (L) 01/04/2024    GFRESTIMATED 51 (L) 01/04/2024    LLOYD 8.0 (L) 01/04/2024       Farida Charles PA-C  Inpatient Diabetes Service  Pager: [phone]  Available on Med ePad    To contact Endocrine Diabetes service:   From 7AM-5PM: page inpatient diabetes provider who is following the patient that day (see filed or incomplete progress notes/consult notes).  If uncertain of provider assignment: page job code [id number]. (To page job code in-house dial 3 stars, [phone] then " enter number).  For questions or updates AFTER HOURS from 5PM-7AM: page the diabetes job code for on call fellow: [phone]    Please notify inpatient diabetes service if changes are planned to steroids, nutrition, or if procedures are planned requiring prolonged NPO status. Diabetes Management Team job code: [phone]     I spent 45 minutes on the date of the encounter doing prep/post-work, chart review, history and exam, documentation and further activities per the note including lab review, multidisciplinary communication, counseling the patient and/or coordinating care regarding acute hyper/hypoglycemic management, as well as discharge management and planning/communication.  See note for details.

## 2024-01-05 NOTE — PLAN OF CARE
Goal Outcome Evaluation:      Plan of Care Reviewed With: patient  Overall Patient Progress: no change    Outcome Evaluation: Pt is alert and oriented x 4, denies pain, able to make his needs known and uses call light approriately. Bg check at 1700 was 261 , sliding scale insulin given and carb coverage given with meals. Pt c/o not feeling right, recheck BG-391 and VSS. paged the Dr on-call who consulted Endocrinologist advised to recheck BG @ 2200 hrs . Recheck BG at 2200 hrs was 344, paged the On -call who advised to give sliding scale insulin as per order.  Mixed continence , LBM 1/4.  RT PIV saline locked. call light within reach and safety precaution in place.

## 2024-01-06 NOTE — PROGRESS NOTES
Diabetes Consult Daily  Progress Note          Assessment/Plan:     HPI:  Tc Garcia is a 83 year old right hand dominant male with a relevant PMH of DM1 on insulin pump (history of DKA), paroxysmal atrial fibrillation on eliquis, HTN, HLD, CKD stage 3, recurrent pleural effusion, and history of spontaneous intracranial hemorrhage who sustained a left intraparenchymal hematoma after a fall on 12/11/23, and found to have left medial frontal lobe diffusion restriction with sequelae of  left sided weakness, lower extremity more than upper extremity, as well as increased blurriness of vision.    Assessment:   Type 1 Diabetes Mellitus who presented in DKA (resolved) with diabetes c/b nephropathy, neuropathy who is managed outpatient on [employer] amb pump and Lucho CGM. A1c 8.1% (less stringent control for elderly patients with sig medical complexities for safety)   2.   Labile BG    Continue to have labile BG control. Glucose improved overnight after sustained hyperglycemia yesterday. Tight glucose control this morning with hyperglycemia again at noon. Will relax Lantus slightly tonight and relax Novolog ICR starting at breakfast. Will continue current Novolog correction scale for now. Given patient's age, mild confusion, and comorbidites, goal is not for tight glucose control.     Plan:       -  Reduce Lantus from 7 to 6 unit(s) q24 hours at 1800 hrs    -  Relax Novolog ICR from 1:15 to 1:18 g cho TID AC/snacks.  Cover all intake. (If not hitting threshold, may need to switch to echo pen and do 0.5 unit(s) per 12.5 g cho dosing).    -  Continue Novolog correction scale 1:50 >140 TID AC; 1:50 >200 HS and 0200     -  BG TID AC/HS and 0200    -  Ok to wear Lucho 2 - nursing must continue to do POC BG monitoring as ordered.  CGM not to be used for decision-making - per hospital policy.  - hypoglycemia protocol  - education needs : formal pump assessment/support for restart. Consult  "completed and is NOT appropriate for resumption of pump at this time (see notes from )  - Outpatient diabetes follow up: Dr Asif at [facility] clinic of Endocrinology  -  Diabetes service will continue to follow, please do not hesitate to contact the team with any questions/concerns. First call after hours is to primary service.    -  Please notify inpatient diabetes service if changes are planned that will impact glycemia, such as NPO, starting/adjusting steroids, enteral feeding, parenteral feeding, dextrose fluids or procedures.     Plan discussed with patient, bedside RN/primary team via this note.         Interval History:   - Overall feels great today. Denies pain. Denies nausea, vomiting, diarrhea, or constipation.   - Notes that his Lucho 2 sensor  and he does not have a replacement.   - Continues to work with therapies       Planned Procedures/surgeries: none  D5W-containing solutions/medications: none        Nutrition:     Orders Placed This Encounter      Combination Diet Moderate Consistent Carb (60 g CHO per Meal) Diet; Regular Diet; Thin Liquids (level 0)        PTA Regimen:   INSULIN PUMP -  [employer]tr[employer] minimed  Sensitivity factor (midnight to midnight): 60  Bolus (units:gram): 9669-7959 = 1:9, 3761-5677 = 1:7, 7743-8977 = 1:7, 9176-4702 = 1:9, 1839-6835 = 1:13   Basal Rates and Start/End times:   Rate #1 =  0.45 units/hr from 0000 to 0200.   Rate #2 = 0.45 units/hr from 0201 to 0600.   Rate #3 = 0.625 units/hr from 0601 to 0900.   Rate #4 = 0.625 units/hr from 0901 to  1700   Rate #5 = 0.50 units/hr from 1701 to 2359.      Maintain blood glucose level within a specified target range  1083-2630 110-150  4323-6410 100-120  0112-0962 100-140    Active insulin time 4 hours           Review of Systems:   CC: denies all          Medications:   Steroid planning:  none       Physical Exam:   /72 (BP Location: Left arm)   Pulse 72   Temp 98.1  F (36.7  C) (Oral)   Resp 16   Ht 1.753 m (5' 9\")  "  Wt 64 kg (141 lb)   SpO2 98%   BMI 20.82 kg/m    General:   A&O, NAD, pleasant, resting in bed. Lucho is on his L arm () - thin man - wearing his glasses   HEENT:  NC/AT. MMM, EOMI, Anicteric. Hearing WNL.   Lungs:  unremarkable, no new cough, no SOB  ABD:   rounded, soft   Extremities:  Moving all extremities - compression socks on - thickened nails - fungal  Skin:  warm and dry, no obvious lesions/rash/bleeding  Neuro:  No focal neurological deficits - waxing/waning cognition   Psych:   Cooperative, appropriate mood, good eye contact, congruent affect          Data:     Lab Results   Component Value Date    A1C 8.1 2023    A1C 8.0 2023    A1C 7.2 2023    A1C 7.7 2020    A1C 7.4 2020    A1C 8.2 2020    A1C 7.7 10/31/2019    A1C 7.2 2009        CBC RESULTS:   Recent Labs   Lab Test 24  0549   WBC 8.2   RBC 3.21*   HGB 8.8*   HCT 27.8*   MCV 87   MCH 27.4   MCHC 31.7   RDW 17.7*          Last Comprehensive Metabolic Panel:  Lab Results   Component Value Date     2024    POTASSIUM 3.9 2024    CHLORIDE 101 2024    CO2 24 2024    ANIONGAP 12 2024    GLC 89 2024    BUN 13.7 2024    CR 1.39 (H) 2024    CR 1.37 (H) 2024    GFRESTIMATED 50 (L) 2024    GFRESTIMATED 51 (L) 2024    LLOYD 8.0 (L) 2024       Farida Charles PA-C  Inpatient Diabetes Service  Pager: [phone]  Available on Zaya    To contact Endocrine Diabetes service:   From 7AM-5PM: page inpatient diabetes provider who is following the patient that day (see filed or incomplete progress notes/consult notes).  If uncertain of provider assignment: page job code [phone]. (To page job code in-house dial 3 stars, [phone] then enter number).  For questions or updates AFTER HOURS from 5PM-7AM: page the diabetes job code for on call fellow: [phone]    Please notify inpatient diabetes service if changes are planned to steroids, nutrition, or if  procedures are planned requiring prolonged NPO status. Diabetes Management Team job code: [phone]     I spent 40 minutes on the date of the encounter doing prep/post-work, chart review, history and exam, documentation and further activities per the note including lab review, multidisciplinary communication, counseling the patient and/or coordinating care regarding acute hyper/hypoglycemic management, as well as discharge management and planning/communication.  See note for details.

## 2024-01-06 NOTE — PROGRESS NOTES
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 Min A with slippers, dependent with spandi  on BLE's   Bathing: Recommend tub bench-pt declined initial shower  Toileting: CGA/min A with walker  IADLs: Unable to correctly setup 0/4 medications with med box. Anticipate assistance with med mgmt upon discharge.   Vision/Cognition: further assessment     Assessment: OT: am tx session facilitated ADL tasks, please see flow sheet for details. Overall CGA and min a for LB dressing. Good participation, motivated.     Other Barriers to Discharge (DME, Family Training, etc): Pt reports having shower chair at home.   Family training prior to discharge

## 2024-01-06 NOTE — PLAN OF CARE
Goal Outcome Evaluation:      Plan of Care Reviewed With: patient    Overall Patient Progress: no change    Orientation: Alert and oriented  Bowel: Continent LBM 1/4  Bladder: Continent  Pain:denied  Ambulation/Transfers: A1 with walker and gaitbelt  Blood sugars: 359 and 271 with sliding scale and carb coverage  Diet/Liquids: Tolerating diet well; good appetite noted; took pills whole with water  Tubes/Lines/Drains: PIV on R arm; saline locked  Skin: Skin tear on left wrist with mepilex dressing  Redness on sacrum/coccyx; mepilex applied    Had shower tonight. No new skin issues noted. Uses call light appropriately. Bed alarm on for safety. No care concern at this time. Continue POC.

## 2024-01-06 NOTE — PLAN OF CARE
Pt. Is A & O X 4. BG @ 0200 Hrs is 121. Insulin not administered due order parameters not met. 2L of oxygen delivered via nasal cannular to maintain oxygen saturation between 96-98%  and in the mid 80s-90 without oxygen on. Pt is non compliant with keeping nasal cannular on. Wound dressing is clean, dry and intact. Call button placed within reach. Plan of care is ongoing.

## 2024-01-06 NOTE — PLAN OF CARE
Discharge Planner Post-Acute Rehab SLP:     Discharge Plan: Home with  SLP. 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 Subjectively, impaired reasoning, attention, immediate memory. Further assessment to be completed or deferred to neuropsych   Swallow: regular, thin (0) liquid. NO STRAWS     Assessment: Pt with minimal recall of strategies for memory introduced yesterday. SLP redirected to visual depicting them and provided re education. Pt recalling need to order lunch and able to report correct steps to complete task. Pt participated in task using visual to solve functional math ?s. Reviewed strategies to aid organization and IND. Mild disorganization and full accuracy with mental calculations but good reasoning and sequencing within task. Required to complete remaining problems IND for PM session     Other Barriers to Discharge (Family Training, etc): family training: IADL assist

## 2024-01-06 NOTE — PROGRESS NOTES
Patient AxOx4, on RA. VSS. Working with therapy this shift, tolerated well, see notes for details. Cont x2. No BM this shift. BS checks carb coverage for meals. Ax1 with walker and gait belt. Skin tear to Left Arm, C/D/I. Denies pain this shift. Bed in low locked position, bed alarm on and call bell in reach. At this time, pt denies any needs or concerns.

## 2024-01-07 NOTE — PROGRESS NOTES
"  [facility]   Acute Rehabilitation Unit    INTERVAL HISTORY  Tc Garcia was seen during his OT session. He was overall doing well. Denied any pain or discomfort. Eats and drinks well. Has difficulty staying asleep at night time. He sleeps for 11-12 hours at home with no issues but here he goes to bed at 8-9 and is awake around 3-4 am. No pain but nursing care and hospital bed contributing as well.     His toenails are very long and deformed and makes dressing difficult. No clear signs of infection.         MEDICATIONS  Scheduled meds   amLODIPine  10 mg Oral Daily    carvedilol  12.5 mg Oral BID w/meals    enoxaparin ANTICOAGULANT  40 mg Subcutaneous Q24H    insulin aspart   Subcutaneous TID w/meals    insulin aspart  1-7 Units Subcutaneous TID AC    insulin aspart  1-5 Units Subcutaneous BID    insulin glargine  6 Units Subcutaneous Q24H    irbesartan  300 mg Oral At Bedtime    isosorbide mononitrate  60 mg Oral QPM    ramelteon  8 mg Oral At Bedtime    sodium chloride (PF)  3 mL Intracatheter Q8H    tamsulosin  0.4 mg Oral QAM       PRN meds:  acetaminophen, glucose **OR** dextrose **OR** glucagon, insulin aspart, - MEDICATION INSTRUCTIONS -, senna-docusate      PHYSICAL EXAM  /68 (BP Location: Left arm)   Pulse 71   Temp 98.1  F (36.7  C) (Oral)   Resp 16   Ht 1.753 m (5' 9\")   Wt 64 kg (141 lb)   SpO2 93%   BMI 20.82 kg/m      General: NAD, sitting in chair   Pulmonary: non-labored in room air   Abdominal: benign   Extremities: no edema in bilateral lower extremities  Neuro: was seen ambulating with FWW    LABS  Results for orders placed or performed during the hospital encounter of 12/29/23 (from the past 24 hour(s))   Glucose by meter   Result Value Ref Range    GLUCOSE BY METER POCT 271 (H) 70 - 99 mg/dL   Glucose by meter   Result Value Ref Range    GLUCOSE BY METER POCT 121 (H) 70 - 99 mg/dL   Glucose by meter   Result Value Ref Range    GLUCOSE BY METER " POCT 89 70 - 99 mg/dL   Glucose by meter   Result Value Ref Range    GLUCOSE BY METER POCT 233 (H) 70 - 99 mg/dL   Glucose by meter   Result Value Ref Range    GLUCOSE BY METER POCT 346 (H) 70 - 99 mg/dL       ASSESSMENT AND PLAN  Tc Garcia is a 83 year old right hand dominant male with a PMH of DM2 with DKA, paroxysmal atrial fibrillation formerly on apixaban, hypertension, hyperlipidemia, pelural effusion, CKD stage 3, BPH, and ACD who sustained a left cerebellar intracranial hemorrhage from a fall on 12/11/23 with a possible left medial frontal lobe infarct on imaging.  His deficits include decreased visual acuity and mild left hemiparesis. He was admitted to acute inpatient rehabilitation on 12/27/23.     Impairment group code: 02.22 Traumatic, Closed Injury; Fall with resulting intracranial hemorrhage       --Vitals stable.   --Labs: none today. Labile BGs  --Continue ongoing medical management.   -reviewed endo team note; appreciate their assistance    -podiatry consult for Monday for onychomycosis   -changed melatonin with ramelteon and will monitor his response     --Continue therapies and plan of care. Attended fall prevention class today. Requires CGA to min A with most ADLs and mobility.     Dayna Katz MD  Physical Medicine & Rehabilitation

## 2024-01-07 NOTE — PROGRESS NOTES
"Patient with confusion in the AM. Patient alert and oriented x2-3, kept stating \"someone needs to tell me what is going on here\". Later in morning, patient admits that he was confused and did not know what caused it. At this time, patient alert and oriented x4. VSS this am, on RA. BS checks 4x daily. Worked with therapies and tolerated well, see note. Cont x2 (+) BM. Bed in low locked position, bed alarm on and call bell in reach. At this time, pt denies any needs or concerns.    "

## 2024-01-07 NOTE — PLAN OF CARE
Discharge Planner Post-Acute Rehab SLP:     Discharge Plan: Home with HH SLP. 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 Subjectively, impaired reasoning, attention, immediate memory. Further assessment to be completed or deferred to neuropsych   Swallow: regular, thin (0) liquid. NO STRAWS     Assessment:  Pt is significantly more cognitively impaired than previous week, when this SLP last saw pt. Nursing reports awareness and that pt also demonstrating cognitive decline since yesterday but that blood glucose levels do not appear to be a contributing factor. Pt was confused regarding this morning's events. Pt reports being awakened at 7 which is early. Nursing reports pt was asleep at approximately 8 am. Pt reported placing breakfast order, but kitchen had no order placed. Pt demonstrated mild impairments with attention this date. Pt was able to recall one of four trained memory strategies (write it down). These will be a focus in next session.     PM: Pt improved from this AM. Nursing reports pt is aware of increased confusion this morning. Pt performed informaiton processing/organization tasks to completion with min cues. On last task, pt required mod cues for the last example. Pt performed deductive reasoning task to completion with mod verbal cues. Pt was not able to complete this task on previous day but did recall attempting to perform it.     Other Barriers to Discharge (Family Training, etc): family training: IADL assist

## 2024-01-07 NOTE — PLAN OF CARE
Discharge Planner Post-Acute Rehab PT:     Discharge Plan: Home with home care - care/assist TBD as pt is caregiver for his spouse    Precautions: Alarms    Current Status:  Bed Mobility: Supervision  Transfer: CGA/min-A FWW  Gait: ' FWW  Stairs: CGA B rail  Balance: Able to stand w/o support - slight posterior lean, braces against objects w/ transfers  Fall Class completed:  1/6/24    Outcome Measures:   Ibarra 1/1 = 20/56    Assessment: Continues to improve overall mobility and balance rossy to L side.  Did have 1 LOB needing PT correction to L when turning to sit down with fww.     Other Barriers to Discharge (DME, Family Training, etc):   Caregiver role for his spouse  Cognition

## 2024-01-07 NOTE — PLAN OF CARE
Brief Orthopedic Surgery/Podiatry Note    Orthopedics/Podiatry was consulted for nail trimming/onychomycosis. Based on conversation with RN/ordering provider no concern for infection at this time.     Our podiatrist rounds on Thursday each week. The next rounding date is 1/11. The patient will be seen at that time if they remain inpatient. Should the patient discharge prior to that time, please place a outpatient podiatry referral for nail cares.     Of note, this patient was not evaluated by this provider. Please page Orthopedic Surgery if further assistance is needed.       Tank Alberto MD  PGY-1  Orthopaedic Surgery  Pager: [phone]

## 2024-01-07 NOTE — PLAN OF CARE
Goal Outcome Evaluation:      Plan of Care Reviewed With: patient    Overall Patient Progress: no change    Orientation: Alert and oriented  Bowel: Continent LBM 1/6  Bladder: Continent  Pain:denied  Ambulation/Transfers: A1 with walker and gaitbelt  Blood sugars: 346 and 244 with sliding scale and carb coverage  Diet/Liquids: Tolerating diet well; good appetite noted; took pills whole with water  Tubes/Lines/Drains: PIV on R arm; saline locked  Skin: Skin tear on left wrist with mepilex dressing  Redness on sacrum/coccyx with mepilex dressing      Uses call light appropriately. Bed alarm on for safety. No care concern at this time. Continue POC.

## 2024-01-07 NOTE — PLAN OF CARE
Goal Outcome Evaluation:    Overall Patient Progress: no change    Outcome Evaluation: No change in pt progress this shift    Pt is A/O x4. No impulsive behavior overnight, able to make needs known. Denied pain, SOB, chest pain, or new symptoms. Continent B/B, LBM 1/6. Transfers A1 with walker. BG was 145, correction insulin held per parameters. R-PIV is patent, flushed, and saline-locked. Pt appeared asleep during safety rounds. Call light in reach, bed alarm on. Continue plan of care.

## 2024-01-07 NOTE — PROGRESS NOTES
"  [facility]   Acute Rehabilitation Unit    INTERVAL HISTORY  Tc Garcia was seen and examined in his room. He was doing well. Denied any new issues. Agreed that he was drowsy and confused this am; was doing better later in the morning. Slept much better last night. Reviewed the plan for podiatry         MEDICATIONS  Scheduled meds   amLODIPine  10 mg Oral Daily    carvedilol  12.5 mg Oral BID w/meals    enoxaparin ANTICOAGULANT  40 mg Subcutaneous Q24H    insulin aspart   Subcutaneous BID w/meals    insulin aspart   Subcutaneous Daily with supper    insulin aspart  1-7 Units Subcutaneous TID AC    insulin aspart  1-5 Units Subcutaneous BID    insulin glargine  6 Units Subcutaneous Q24H    irbesartan  300 mg Oral At Bedtime    isosorbide mononitrate  60 mg Oral QPM    ramelteon  8 mg Oral At Bedtime    sodium chloride (PF)  3 mL Intracatheter Q8H    tamsulosin  0.4 mg Oral QAM       PRN meds:  acetaminophen, glucose **OR** dextrose **OR** glucagon, insulin aspart, - MEDICATION INSTRUCTIONS -, senna-docusate      PHYSICAL EXAM  /59 (BP Location: Left arm)   Pulse 74   Temp 98.1  F (36.7  C) (Oral)   Resp 16   Ht 1.753 m (5' 9\")   Wt 63 kg (139 lb)   SpO2 91%   BMI 20.53 kg/m      General: NAD, resting in bed   Pulmonary: non-labored in room air   Abdominal: benign   Extremities: no edema in bilateral lower extremities  Neuro: was seen ambulating with FWW    LABS  Results for orders placed or performed during the hospital encounter of 12/29/23 (from the past 24 hour(s))   Glucose by meter   Result Value Ref Range    GLUCOSE BY METER POCT 346 (H) 70 - 99 mg/dL   Glucose by meter   Result Value Ref Range    GLUCOSE BY METER POCT 244 (H) 70 - 99 mg/dL   Glucose by meter   Result Value Ref Range    GLUCOSE BY METER POCT 145 (H) 70 - 99 mg/dL   Glucose by meter   Result Value Ref Range    GLUCOSE BY METER POCT 178 (H) 70 - 99 mg/dL   Glucose by meter   Result Value Ref " Range    GLUCOSE BY METER POCT 223 (H) 70 - 99 mg/dL   Glucose by meter   Result Value Ref Range    GLUCOSE BY METER POCT 238 (H) 70 - 99 mg/dL   Glucose by meter   Result Value Ref Range    GLUCOSE BY METER POCT 301 (H) 70 - 99 mg/dL       ASSESSMENT AND PLAN  Tc Garcia is a 83 year old right hand dominant male with a PMH of DM2 with DKA, paroxysmal atrial fibrillation formerly on apixaban, hypertension, hyperlipidemia, pelural effusion, CKD stage 3, BPH, and ACD who sustained a left cerebellar intracranial hemorrhage from a fall on 12/11/23 with a possible left medial frontal lobe infarct on imaging.  His deficits include decreased visual acuity and mild left hemiparesis. He was admitted to acute inpatient rehabilitation on 12/27/23.     Impairment group code: 02.22 Traumatic, Closed Injury; Fall with resulting intracranial hemorrhage       --Vitals stable.   --Labs: none today. Labile BGs  --Continue ongoing medical management.   -reviewed endo team note; appreciate their assistance    -podiatry consult for onychomycosis - will come Thursday    -changed melatonin with ramelteon on Sat; responded well. I think he was drowsy this am and more confused because he got both melatonin and ramelteon last night. No clinical s/s of infection or new medical issues    -removed PIV     --Continue therapies and plan of care. Attended fall prevention class today. Requires CGA to min A with most ADLs and mobility.     Dayna Katz MD  Physical Medicine & Rehabilitation

## 2024-01-07 NOTE — PROGRESS NOTES
Diabetes Consult Daily  Progress Note          Assessment/Plan:     HPI:  Tc Garcia is a 83 year old right hand dominant male with a relevant PMH of DM1 on insulin pump (history of DKA), paroxysmal atrial fibrillation on eliquis, HTN, HLD, CKD stage 3, recurrent pleural effusion, and history of spontaneous intracranial hemorrhage who sustained a left intraparenchymal hematoma after a fall on 12/11/23, and found to have left medial frontal lobe diffusion restriction with sequelae of  left sided weakness, lower extremity more than upper extremity, as well as increased blurriness of vision.    Assessment:   Type 1 Diabetes Mellitus who presented in DKA (resolved) with diabetes c/b nephropathy, neuropathy who is managed outpatient on [employer] amb pump and Lucho CGM. A1c 8.1% (less stringent control for elderly patients with sig medical complexities for safety)   2.   Labile BG    Hyperglycemia following breakfast and lunch yesterday, improved following dinner. No longer having tight glucose control overnight with less Lantus onboard. Will intensify breakfast and lunch carb coverage. Otherwise continue current regimen.     Plan:       -  Continue Lantus 6 unit(s) q24 hours at 1800 hrs    -  Intensify Novolog ICR from 1:18 to 1:12 g cho with breakfast and lunch. Continue 1:18 g CHO with dinner and snacks.  Cover all intake. (If not hitting threshold, may need to switch to echo pen and do 0.5 unit(s) per 12.5 g cho dosing).    -  Continue Novolog correction scale 1:50 >140 TID AC; 1:50 >200 HS and 0200     -  BG TID AC/HS and 0200    -  Ok to wear Lucho 2 - nursing must continue to do POC BG monitoring as ordered.  CGM not to be used for decision-making - per hospital policy.  - hypoglycemia protocol  - education needs : formal pump assessment/support for restart. Consult completed and is NOT appropriate for resumption of pump at this time (see notes from 1/2)  - Outpatient diabetes  follow up: Dr Asif at [facility] of Endocrinology  -  Diabetes service will continue to follow, please do not hesitate to contact the team with any questions/concerns. First call after hours is to primary service.    -  Please notify inpatient diabetes service if changes are planned that will impact glycemia, such as NPO, starting/adjusting steroids, enteral feeding, parenteral feeding, dextrose fluids or procedures.     Plan discussed with patient, bedside RN/primary team via this note.         Interval History:   - Notes that he's feeling confused today, slept in later than usual. Confused about his schedule today, reviewed with him on his white board.  - Denies nausea, vomiting, diarrhea, or constipation.   - Lucho 2 sensor , patient removed today during visit. He has more sensors being shipped to his house, family/friend will then bring to him.  - Continues to work with therapies   - Podiatry planning to see patient  for toe nail trimming.       Planned Procedures/surgeries: none  D5W-containing solutions/medications: none        Nutrition:     Orders Placed This Encounter      Combination Diet Moderate Consistent Carb (60 g CHO per Meal) Diet; Regular Diet; Thin Liquids (level 0)        PTA Regimen:   INSULIN PUMP -  [employer] minimed  Sensitivity factor (midnight to midnight): 60  Bolus (units:gram): 3032-3784 = 1:9, 2397-7974 = 1:7, 9610-2803 = 1:7, 4829-8485 = 1:9, 5306-0639 = 1:13   Basal Rates and Start/End times:   Rate #1 =  0.45 units/hr from 0000 to 0200.   Rate #2 = 0.45 units/hr from 0201 to 0600.   Rate #3 = 0.625 units/hr from 0601 to 0900.   Rate #4 = 0.625 units/hr from 0901 to  1700   Rate #5 = 0.50 units/hr from 1701 to 2359.      Maintain blood glucose level within a specified target range  7328-6197 110-150  9202-8664 100-120  4940-5211 100-140    Active insulin time 4 hours           Review of Systems:   CC: denies all          Medications:   Steroid planning:  none        "Physical Exam:   /59 (BP Location: Left arm)   Pulse 74   Temp 98.1  F (36.7  C) (Oral)   Resp 16   Ht 1.753 m (5' 9\")   Wt 63 kg (139 lb)   SpO2 91%   BMI 20.53 kg/m    General:   A&O, NAD, pleasant, resting in bed.  HEENT:  NC/AT. MMM, EOMI, Anicteric. Hearing WNL.   Lungs:  unremarkable, no new cough, no SOB  ABD:   rounded, soft   Extremities:  Moving all extremities.  Skin:  warm and dry, no obvious lesions/rash/bleeding  Neuro:  No focal neurological deficits - waxing/waning cognition   Psych:   Cooperative, appropriate mood, good eye contact, congruent affect          Data:     Lab Results   Component Value Date    A1C 8.1 12/12/2023    A1C 8.0 04/20/2023    A1C 7.2 01/17/2023    A1C 7.7 11/20/2020    A1C 7.4 07/08/2020    A1C 8.2 04/25/2020    A1C 7.7 10/31/2019    A1C 7.2 06/30/2009        CBC RESULTS:   Recent Labs   Lab Test 01/04/24  0549   WBC 8.2   RBC 3.21*   HGB 8.8*   HCT 27.8*   MCV 87   MCH 27.4   MCHC 31.7   RDW 17.7*        Last Comprehensive Metabolic Panel:  Lab Results   Component Value Date     01/04/2024    POTASSIUM 3.9 01/04/2024    CHLORIDE 101 01/04/2024    CO2 24 01/04/2024    ANIONGAP 12 01/04/2024     (H) 01/07/2024    BUN 13.7 01/04/2024    CR 1.39 (H) 01/04/2024    CR 1.37 (H) 01/04/2024    GFRESTIMATED 50 (L) 01/04/2024    GFRESTIMATED 51 (L) 01/04/2024    LLOYD 8.0 (L) 01/04/2024       Farida Charles PA-C  Inpatient Diabetes Service  Pager: [phone]  Available on [employer]    To contact Endocrine Diabetes service:   From 7AM-5PM: page inpatient diabetes provider who is following the patient that day (see filed or incomplete progress notes/consult notes).  If uncertain of provider assignment: page job code [phone]. (To page job code in-house dial 3 stars, [phone] then enter number).  For questions or updates AFTER HOURS from 5PM-7AM: page the diabetes job code for on call fellow: [phone]    Please notify inpatient diabetes service if changes are planned to " steroids, nutrition, or if procedures are planned requiring prolonged NPO status. Diabetes Management Team job code: [phone]     I spent 30 minutes on the date of the encounter doing prep/post-work, chart review, history and exam, documentation and further activities per the note including lab review, multidisciplinary communication, counseling the patient and/or coordinating care regarding acute hyper/hypoglycemic management, as well as discharge management and planning/communication.  See note for details.

## 2024-01-08 NOTE — PROGRESS NOTES
"Patient AxOx3-4, intermittent confusion, on RA. VSS. Reports of feeling thoughts of \"not waking up and being better off\". Pt this morning verbalized having a slow rough start to the morning. No passive or active thoughts of death this AM. Celestino consulted. Worked with therapy this shift, reports of feeling weak senior living through therapy. VS and BS stable after therapy. At this time, patient resting in bed comfortable Cont x2. Bed in low locked position, bed alarm on and call bell in reach. At this time, pt denies any needs or concerns.    "

## 2024-01-08 NOTE — PLAN OF CARE
Discharge Planner Post-Acute Rehab SLP:     Discharge Plan: Home with  SLP. 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 Subjectively, impaired reasoning, attention, immediate memory. Further assessment to be completed or deferred to neuropsych   Swallow: regular, thin (0) liquid. NO STRAWS     Assessment:  Pt on family call upon arrival. Pt reported spouse (with dementia who he was caring for at baseline) was very agitated with dtr and dc plan for different level of care. Pt acknowledges need for increased care and assist level but expressed concern for spouse's understanding. SLP provided empathetic listening, support, and couseling. SLP provided education re: strategies to ease transition to new environments for individuals with dementia. Pt appreciative of recommendations and information and able to redirect to therapy. Pt engaged in planning/organization task, arranging items within kitchen given prompts. completed with 90% accuracy IND, 100% max cues. SLP provided handoff to SW. SW to call dtr and provide USP resources     Other Barriers to Discharge (Family Training, etc): family training: IADL assist

## 2024-01-08 NOTE — PROGRESS NOTES
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 Min A with slippers, dependent with spandi  on BLE's   Bathing: Recommend tub bench-pt declined initial shower  Toileting: CGA/min A with walker  IADLs: Unable to correctly setup 0/4 medications with med box. Anticipate assistance with med mgmt upon discharge.   Vision/Cognition: further assessment     Assessment: Focused on partial ADL routine with walker. Focused on functional standing balance with light home mgmt tasks. Pt continues to verbalize sadness and stating \"I told the drs. I'm done, I'm giving up.\" Encouraged pt to continue to participate and progress made in the past week.    -15 d/t MD visit and pt declining to participate further  Other Barriers to Discharge (DME, Family Training, etc): Pt reports having shower chair at home.   Family training prior to discharge           "

## 2024-01-08 NOTE — PLAN OF CARE
Goal Outcome Evaluation:      Plan of Care Reviewed With: patient    Overall Patient Progress: no change    Orientation: Alert and oriented  Bowel: Continent LBM 1/7  Bladder: Continent  Pain: left buttock pain; managed with PRN Tylenol and Bengay topical gel with effectiveness noted  Ambulation/Transfers: A1 with walker and gaitbelt  Blood sugars:271 and 180 with sliding scale and carb coverage  Diet/Liquids: Tolerating diet well; good appetite noted; took pills whole with water  Skin: Scab on left wrist, ERIN  Redness on sacrum/coccyx with mepilex dressing       Uses call light appropriately. Bed alarm on for safety. Continue POC.

## 2024-01-08 NOTE — PROGRESS NOTES
01/08/24 1500   Signing Clinician's Name / Credentials   Signing clinician's name / credentials CHRISTINE Draper   Ibarra Balance Scale (JAMILAH SAMANIEGO, NIMCO S, KEARA BUCKNER, AGA BARR: MEASURING BALANCE IN THE ELDERLY: VALIDATION OF AN INSTRUMENT. CAN. J. [employer]. HEALTH, JULY/AUGUST SUPPLEMENT 2:S7-11, 1992.)   Sit To Stand 3   Standing Unsupported 4   Sitting Unsupported 4   Stand to Sit 3   Transfers 1   Standing with Eyes Closed 3   Standing Unsupported, Feet Together 1   Reach Forward With Outstretched Arm 1   Retrieve Object From Floor 1   Turning to Look Behind 1   Turn 360 Degrees 0   Placing Alternate Foot on Stool (4-6 inches) 1   Unsupported Tandem Stand (Demonstrate to Subject) 1   One Leg Stand 0   Total Score (A score of 45 or less has been correlated with an increased risk of falls)   Total Score (out of 56) 24     Ibarra Balance Scale (BBS) Cutoff Scores: A score of < 45 /56 indicates an increased risk for falls.     The BBS is a measure of static and dynamic standing balance that has been validated in community dwelling elderly individuals and individuals who have Parkinson's Disease, MS, and those who are s/p CVA and TBI. The test is administered without an assistive device. Scores from the Ibarra are used to determine the probability of falling based on the patient's previous history of falls and their test performance.     Minimal Detectable Change = 6.5   & Minimal Detectable Change (Parkinson's Disease) = 5 according to Alessandro & Leyla 2008    Assessment (rationale for performing, application to patient s function & care plan): Pt demonstrated improved standing balance activities on Ibarra today. Score improved but not within statistically significant range.     (Minutes billed as physical performance test)  ---------------------------------------------------------------------  10 Meter Walk Test:  Setup - using open 10 meter walkway. Can be setup individually or using marks along base board of [facility]  "hallway between PT and OT gyms.  Instructions - comfortable/self chosen pace: \"Walk at your own comfortable walking pace and stop when you reach the end,\"   and fast pace: \"Walk as fast as you can safely walk and stop when you reach the end.\"   Time was started as the lead foot crossed the 2 meter felicity and finished as the patient's lead foot crossed the 8 meter felicity.    Assistive Device: Walker   Assist Provided: [facility]    Average Speed for Comfortable Pace - .35 m/s  Average Speed for Fast Pace - .71 m/s     Assessment: Pt showed ability to speed up when aware of being timed, had reduced safety at increased speed.       Normative Data:    Spinal Cord Injury -   MDC - 0.13 m/s (Fredy et al, 2008)  MCID - 0.06 m/s (Sherine et al, 2007)  0.13 m/s (Fredy et al, 2008)    Gait Speed for Community Dwellers - Melissa et al, 2005  Low Gait Speed - <0.7 m/sec  Mean Gait Speed - 0.7-1.0 m/sec  High Functioning Gait Speed - >1.1 m/sec  \      "

## 2024-01-08 NOTE — CONSULTS
NEUROPSYCHOLOGICAL EVALUATION??   **This is a medical document intended for communication with the referring provider. It is written in medical language and may contain abbreviations or verbiage that are unfamiliar. It may appear blunt or direct. This report and the results within are not intended to be interpreted in isolation without consultation with your medical provider. **??     ??     RELEVANT HISTORY AND REASON FOR REFERRAL??     This is a report of neuropsychological consultation regarding Tc Garcia, an 83-year-old, right-handed, White man with 16 years of formal education. Tc was referred for neuropsychological consultation by the inpatient team to evaluate his current cognitive and psychiatric status and make recommendations related to discharge planning.?     Tc s medical history is significant for f DM2 with DKA, paroxysmal atrial fibrillation formerly on apixaban, hypertension, hyperlipidemia, pelural effusion, CKD stage 3, BPH, and ACD. According to the medical records, Tc experienced a left intraparenchymal hemorrhage after a fall on 12/11/2023. He was sitting down at a table when he became dizzy and sustained a fall with head injury. He described the head injury as mild and denied loss of consciousness. Head CT taken on 12/11/2023 showed a 1.5x3cm left inferior cerebellar hemorrhage, as well as a 0.1x0.3cm area of left medial frontal lobe diffusion restriction. Afterward, he developed left-sided weakness, with lower extremity more effected than upper extremity, as well as blurred vision. Hospital course was notable for dysphagia, hypertension, hypotension, labile glucose, nausea, urinary tract infection, diabetic ketoacidosis, and acute kidney injury.      He was initially admitted to the ARU on 12/27/2023, but was transferred to the ED due to diabetic ketoacidosis. He was asymptompatic at the time. He believes the reason was that he tried to manage his own insulin with a machine whose  injector did not work - although upon follow it, it appears that due to user error the machine was not fully linked together. He was given D5NS and endocrinology adjusted his insulin. He was readmitted to the ARU on 12/29/2023 after blood glucose was stabilized. Endocrinology followed until 1/2, when he was transitioned to an insulin pump.     The most recent head CT taken on 12/31/2023,  1. Expected evolution of left cerebellar intraparenchymal hemorrhage, which is decreased in size. No acute intracranial hemorrhage. 2. Near complete resolution of scattered subarachnoid and intraventricular hemorrhage. 3. Moderate diffuse cerebral atrophy and chronic small vessel ischemic disease.      The most recent MRI of Tc s brain, taken on 12/14/2023, found,  1. ?Accounting for differences in modality, there is overall unchanged appearance of the left cerebellar intraparenchymal hematoma with intraventricular extension when compared to the earlier same day head CT. Small volume subarachnoid hemorrhage involving the bilateral cerebral sulci near the vertex is also similar. No evidence of new hemorrhage. 2.?There is a 0.4 x 0.3 cm area of diffusion restriction along the medial left frontal lobe which could be intraparenchymal and represent a small focus of acute infarct. 3. ?There are punctate foci of chronic microhemorrhage within the bilateral cerebellar hemispheres and cerebral white matter, slightly increased in number compared to 04/25/2023. Given the overall distribution, this could relate to sequela of chronic hypertension.      In physical therapy and occupational therapy, Tc is working on reduce posterior leaning and fall risk during daily tasks. He can currently use a walker for standing and walking. He is independent in grooming and toileting. He reportedly has a shower chair at home to reduce fall risk in the shower. He needs minimal assistance for dressing. He has demonstrated challenges with independently  managing medications and assistance with medications is anticipated upon discharge. Speech therapy noted that speech/language and other cognitive performances were normal across age norms, but further cognitive assessment was recommended due to his caregiving responsibilities to his wife.       CLINICAL INTERVIEW      Tc was seen in his hospital room for the entirety of the evaluation. He was interviewed alone.     Tc denied cognitive concerns.     Tc currently lives with his wife in a 2-story home with chair lifts at the stairwells. His wife reportedly has dementia, and he is partially responsible for caregiving. While he has been recovering, his daughter and son-in-law have provided support. Today, he expressed interest in moving to an assisted living center.      Tc stated that he and his wife independently manage  their basic ADLs. He reportedly manages his medications independently and without error. He added that he was diagnosed with type 2 diabetes approximately 10 years ago and independently monitors his blood glucose via CGM system. He typically administers insulin through a tethered pump when hyperglycemic and consumes cookies or soda when hypoglycemic. He explained that his wife independently manages her medications, but she misses one dose weekly. He denied previous difficulty driving and hopes to return to driving upon discharge. He added that his wife does not drive and his neighbors, daughter, and son-in-law often run errands for them. His neighbors, daughter, and son-in-law also provide snow removal in the chu. He reportedly manages meal preparation for himself and his wife. He added that his wife can heat up microwave meals in his absence. He stated that he and his wife share the responsibility of housework. Tc noted that he helps his wife  find the light switches because she forgets.        Tc reported a history of 3 falls within the last year. 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 He also expressed difficulty walking and greater daytime fatigue. When asked how he might respond to a fall (either him or his wife falling), he stated that he may call 911 or attempt to stand.       Psychiatrically, Tc described his mood as  down  since his injury in December 2023. He denied significant psychiatric history and described himself as  usually positive  at baseline - thus his low mood is quite distressing. He denied current use of alcohol and other substances. He reportedly used tobacco daily from age 17 until 15-20 years ago.           Regarding his background, Tc denied developmental, learning, or behavioral challenges in childhood. He described himself as an A student. He reportedly earned a bachelor s degree in . 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 He and his wife have been  for over 60 years. They have a daughter who lives locally, a son who lives in [facility], and a late daughter.      BEHAVIORAL OBSERVATIONS??     Tc was woken up to participate in neuropsychological testing. He became alert within minutes. He had remembered the plan and purpose for the appointment. His affect was consistent with euthymic mood. He laid in his hospital bed throughout the appointment. He was appropriately groomed and dressed. He wore glasses and was somewhat hard of hearing (i.e., mishearing    for [employer]). His speech was somewhat slow and dysphasic . His thoughts were linear and goal directed. His movements were generally slow and his hands were tremulous while writing. He required cues throughout testing and repetition of task instructions. Tc did not display any hallucinations, delusions, marked confusion, or briana. His glucose monitor occasionally went off, briefly interrupting tasks. Throughout testing, he maintained good participation and  frustration tolerance. The results are likely a valid reflection of his current neuropsychological functioning.      MEASURES ADMINISTERED??     The following measures were administered by a postdoctoral fellow, under supervision by Dr. Ambriz:??     Neuropsychological Assessment Battery - Orientation, Screening Auditory Comprehension; Wide Range Achievement Test, 5th Edition - Word Reading; Weschler Adult Intelligence Scale, 4th Edition - Digit Span; Franco Verbal Learning Test, Revised; Brief Visual Memory Test, Revised; Repeatable Battery for the Assessment of Neuropsychological Status - Line Orientation; Trail Making Test; [place] Functional Living Scale; Independent Living Scales - Health and Safety; Geriatric Depression Scale     RESULTS AND INTERPRETATION??     Orientation: Tc was oriented to personal information (name, age, date of birth). He was oriented to place and circumstance. Regarding time, He was one day off for the date and day of the week. He incorrectly stated that the year was 2033 - despite multiple orientation aids in his room.      Language & Related Skills: With regard to receptive language, his single word reading was average. Tc could easily follow 1-2 step directions as well as complex commands (for example,  Before you do  first do  ). Vision and hearing difficulties occasionally impacted his receptive language, and but this was overall functional with cueing and repeition. Regarding expressive language, he told jokes and provided thoughtful answers to questions - there were no paraphasias and he was not overly circumlocutious. His handwriting was somewhat challenging to read due to tremulousness.     Attention & Working Memory: He was able to sustain his attention for approximately 1.5 hours of testing, which included simple social conversation as well as more complex assessment.  Attention and working memory was in the high average range for repeating and reorganizing digits  presented orally.     Visual Perceptional & Constructional Skills: His ability to copy simple shapes was accurate.  His performance was average for perceiving the direction of variably oriented lines.     Learning & Anterograde Memory: His learning and delayed recall for a rote word list was average. While he was able to accurately identify all the words he learned, he also made a false-positive error - which resulted in an overall performed in the extremely low range and likely has functional implications. . His learning and delayed recall for shapes was average. His performance was also average for recognition memory of the same information.       Mental Speed & Executive Functioning: Psychomotor speed was in the low average range for speeded letter sequencing.   Rapid alternating attention was low average for speed, although he made a number of errors suggesting difficulties in higher order attention.      Tc was presented with a number of health and safety scenarios which he was asked to problem-solve. His responses suggested a high level of independence. On functional tasks, he could make change and simulate paying a bill with minimal cues. He could simulate making simple meals for himself. He was unable to simulate taking his medications with an alarm reminder.  Of note, when compared to different populations, Tc s cognitive performance was most similar to those requiring Assisted Living, while he performed much worse than those only requiring care-giver support.      Emotional Functioning & Self-Report Measures: On a self-report measure of emotional functioning, Tc endorsed mild to moderate symptoms of depression. Notably, endorsements included sadness, helplessness, and anhedonia, as well as intrusive thoughts and fearing the worst outcome.      IMPRESSIONS?AND RECOMMENDATIONS     Tc s cognitive profile was broadly within normal limits for his age and level of education. While not considered frankly  impaired, he had difficulty on measures of executive functioning, including measures which require switching back and forth between multiple sets of information, as well as source memory/memory recognition. This may result in challenges when managing both his and his wife s healthcare, or multiple sets of new (or updated/changing) information about his own healthcare. With regards to recognition memory, his variable performance highlights that his memory could be prone to intrusive errors - which has concerning functional implications. He may require structure and cueing at the time of learning. This can be implemented by providing him with short and direct pieces of information, having him repeat it back in his own words, and having the information in a place he can easily review it.      Tc s cognitive performance was relatively stronger than his performance on functional tasks, as noted on formal testing and as observed by other therapeutic disciplines. For example, on formal testing, he performed most similarly to older adults currently in assisted living than compared to those living independently with care-partner support. Additionally, Tc has demonstrated challenges managing his medications as observed across multiple settings and time points. There may be additional factors related to functional tasks which are not well captured on cognitive measures. For example, debility, challenges with vision, hearing, or depressed mood may be impacting him functionally. Overall, Tc may require more support than at baseline due to observed functional decline rather than poor performance on objective test measures.  This will be communicated with the team and additional consultation will be provided as necessary.      Summary of Recommendations     Transition to assisted living for him and his wife is recommended. During today s interview, Tc expressed agreement that assisted living would be most appropriate for  "him and his wife. This will continue to be discussed with the team.      Tc has demonstrated challenges managing his medications across multiple settings and time points. Assistance in medication management for him and his wife is recommended upon discharge.        Within this same vein: With specific regards to his diabetes management, Tc demonstrated accurate reading and interpretation of his blood sugar via his CGM during our brief interaction. Per diabetes consult: \"Despite cognitive impairment, Tc was able to navigate his pump and Lucho reader appropriately. He has adequate supplies of both infusion sets and reservoirs at bedside for the next few weeks .\"  However, later in the day, it was noted that he bolus fed with a BG well below target causing for more concern, and it was observed he could not manipulate the pump nor settings.  He was trying to use a carb wizard which was resulting in grossly inappropriate overall there was a significant and drastic change from the morning when he was quite accurate in monitoring his glucose as well as setting up and using his pump.   His pump was removed.  I believe that results from this evaluation demonstrate variability in Tc's ability to monitor and use his pump specifically.  He has been able to use his glucose monitor system and even apply adequate reasoning about high and low blood sugars.  However, his administration of the insulin appears to be the challenge - including computation of sliding scale insulin needed for coverage. If possible - I believe that Tc can monitor his glucose via the CGM but he should receive oversight over the admission of insulin, whether through the pump or manually.       When communicating with Tc, provide him with short and direct pieces of information, have him repeat them back in his own words, and have the information in a place he can easily review them. He may require repetition or cues due to visual and hearing " concerns.      Thank you for involving us in Tc s care, and I hope this report is useful.? Please do not hesitate to contact us should any further questions arise.??     Lore Cohu, PhD??  Postdoctoral Neuropsychological Fellow??       All services provided by the Postdoctoral Fellow were supervised by this licensed psychologist. I agree with the above information and provided significant input into clinical decision-making and supervision of report writing.??     Maria T Ambriz PsyD, LP??   Clinical Neuropsychologist???       Activities included in this evaluation CPT Code Time Units   Psychological Diagnostic Interview 24287 - 1   Neuropsychology Professional Time [id number] 1   [place] 48557  1   Neuropsychologist Admin/Scoring Tests [id number]     Add On 13193     Psychometrician Time - Test 10519 176 1   Admin/Scoring [id number]  5   DX:

## 2024-01-08 NOTE — PLAN OF CARE
"Goal Outcome Evaluation:    Overall Patient Progress: no change    Outcome Evaluation: No change in pt progress this shift    Pt is A/O x4. No impulsive behavior overnight, able to make needs known. Denied pain, SOB, chest pain, or new symptoms. Continent B/B, LBM 1/7. Transfers A1 with walker. BG was 76, snacks and juice given. Follow-up BG was 129. Pt was awake intermittently throughout the night. Call light in reach, bed alarm on. Continue plan of care.    Pt called nurse around 0200 to say he has \"given up\" and felt he would be \"better off gone.\" Writer performed suicide screening and Pt clarified that he had thoughts of dying due to what he perceives as lack of progress in recovery. Specifically, he woke up at midnight, thought it was noon, and asked another nurse to help him get ready for the day. The nurse reoriented him to time and the pt said he felt dejected that he's been having intermittent confusion. He was A/O x4 during this conversation. Pt denied having a suicide plan or thoughts of hurting himself. Endorsed that he has a history of feeling depressed. Denied pain but reported difficulty sleeping, requested PRN. No PRN sleep aid available, offered non-pharmacological options and accepted warm blanket. In addition, SpO2 range was 91-93%, placed on 2L O2 via NC. Provided active listening and reassurance. Pt encouraged to share his thoughts with provider and discuss treatment options, verbalized understanding. Frequent safety checks performed, bed alarm on, safety hazards removed from pt's reach. Note left for provider for follow-up and psych consult.  "

## 2024-01-08 NOTE — PROGRESS NOTES
BERTHA notified by Resident and SLP that pt's daughter is looking into ALFs for pt. BERTHA called pt's daughter, Camille, to further discuss. BERTHA will email [facility] resources ([facility]) to pt's daughter. BERTHA asked Camille if she had other questions or needed additional resources, Camille said correction resources are all that's needed for now.    Camille's email: [email or web address]    BERTHA received a call from Camille later in the day asking about a meeting tomorrow, BERTHA explained that team rounds will take place tomorrow, Camille would like to be called for pt's rounds. BERTHA let Camille know pt's rounds are tomorrow at 1330, and the team will call around that time to discuss discharge.    JERE Escobedo  Post Acute Float   [facility]    Phone: [phone]  Fax: [phone]

## 2024-01-08 NOTE — PLAN OF CARE
Discharge Planner Post-Acute Rehab PT:     Discharge Plan: Home with home care - care/assist TBD as pt is caregiver for his spouse    Precautions: Alarms    Current Status:  Bed Mobility: Supervision  Transfer: CGA/min-A FWW  Gait: ' FWW  Stairs: CGA B rail  Balance: Able to stand w/o support - slight posterior lean, braces against objects w/ transfers  Fall Class completed:  1/6/24    Outcome Measures:   Ibarra 1/1 = 20/56  Ibarra 1/8 = 24/56    10MWT - Comfortable Pace  - .35 m/s on 1/8    Assessment: Pts Ibarra score improved today especially with standing activities but did not meet MCID. Pt demonstrated good weight shifting with reaching tasks today. Pt had to discontinue session at end due to dizziness, likely BG related, handed off to nursing.     Other Barriers to Discharge (DME, Family Training, etc):   Caregiver role for his spouse  Cognition

## 2024-01-08 NOTE — PROGRESS NOTES
"  [facility]   Acute Rehabilitation Unit    INTERVAL HISTORY  Notes and labs reviewed over past 24 hours. No acute overnight events..    Patient was seen and examined. He reports he is sleeping better. He is having a rough morning in regards to his mood. He reports that he is having a hard time with his slow progress and reports that he \"would be better off not waking up\". He denies any specific SI or HI at this time.     ROS: 10 point ROS was assessed and was negative unless otherwise stated in HPI      Functionally,    With PT: 1/07    Current Status:  Bed Mobility: Supervision  Transfer: CGA/min-A FWW  Gait: ' FWW  Stairs: CGA B rail  Balance: Able to stand w/o support - slight posterior lean, braces against objects w/ transfers  Fall Class completed:  1/6/24     Outcome Measures:   Ibarra 1/1 = 20/56     Assessment: Added 3mm heel lift with slight improvement in posterior lean. Most prominent difficulty with R stance phase    With OT: 1/08    Current Status:  ADLs:  Mobility: CGA/min A with walker d/t  posterior leaning  Grooming: CGA standing with walker, set-up seated  Dressing: SBA with UBD seated, CGA with LBD tasks with walker. Min A with slippers, dependent with spandi  on BLE's   Bathing: Recommend tub bench-pt declined initial shower  Toileting: CGA/min A with walker  IADLs: Unable to correctly setup 0/4 medications with med box. Anticipate assistance with med mgmt upon discharge.   Vision/Cognition: further assessment    With SLP: 1/08    Current Status:  Hearing: WFL  Vision: Glasses  Communication: WFL  Cognition: WNL per CLQT. Subjectively, impaired reasoning, attention, immediate memory. Further assessment to be completed or deferred to neuropsych   Swallow: regular, thin (0) liquid. NO STRAWS      Assessment:  Pt on family call upon arrival. Pt reported spouse (with dementia who he was caring for at baseline) was very agitated with dtr and dc plan for " "different level of care. Pt acknowledges need for increased care and assist level but expressed concern for spouse's understanding.           MEDICATIONS  Scheduled meds   amLODIPine  10 mg Oral Daily    carvedilol  12.5 mg Oral BID w/meals    enoxaparin ANTICOAGULANT  40 mg Subcutaneous Q24H    insulin aspart   Subcutaneous Daily with breakfast    insulin aspart   Subcutaneous Daily with lunch    insulin aspart   Subcutaneous Daily with supper    insulin aspart  1-7 Units Subcutaneous TID AC    insulin aspart  1-5 Units Subcutaneous BID    insulin glargine  6 Units Subcutaneous Q24H    irbesartan  300 mg Oral At Bedtime    isosorbide mononitrate  60 mg Oral QPM    melatonin  1 mg Oral QPM    ramelteon  8 mg Oral At Bedtime    tamsulosin  0.4 mg Oral QAM       PRN meds:  acetaminophen, glucose **OR** dextrose **OR** glucagon, insulin aspart, menthol (Topical Analgesic) 2.5%, - MEDICATION INSTRUCTIONS -, senna-docusate      PHYSICAL EXAM  /53 (BP Location: Left arm)   Pulse 68   Temp 98.1  F (36.7  C) (Oral)   Resp 16   Ht 1.753 m (5' 9\")   Wt 63 kg (139 lb)   SpO2 96%   BMI 20.53 kg/m    General: in no acute distress, conversational and alert, resting comfortably in bed  HEENT: atraumatic, nares clear, conjunctiva white  Pulmonary: on room air, lungs clear to auscultation bilaterally  Cardiovascular: RRR, no murmur auscultated, well-perfused peripherally  Abdominal: soft, non-tender to palpation, non-distended  Extremities: warm peripherally, no edema in BLEs  Skin: warm, dry, without erythema, ecchymosis, or rash noted  MSK: Moves all four extremities volitionally and against gravity.   Neuro: conjugate gaze, speech non-dysarthric,and comprehensible       LABS  Results for orders placed or performed during the hospital encounter of 12/29/23 (from the past 24 hour(s))   Glucose by meter   Result Value Ref Range    GLUCOSE BY METER POCT 301 (H) 70 - 99 mg/dL   Glucose by meter   Result Value Ref Range    " GLUCOSE BY METER POCT 271 (H) 70 - 99 mg/dL   Glucose by meter   Result Value Ref Range    GLUCOSE BY METER POCT 180 (H) 70 - 99 mg/dL   Glucose by meter   Result Value Ref Range    GLUCOSE BY METER POCT 76 70 - 99 mg/dL   Glucose by meter   Result Value Ref Range    GLUCOSE BY METER POCT 129 (H) 70 - 99 mg/dL   CBC with Platelets & Differential    Narrative    The following orders were created for panel order CBC with Platelets & Differential.  Procedure                               Abnormality         Status                     ---------                               -----------         ------                     CBC with platelets and d...[[id number]]  Abnormal            Final result                 Please view results for these tests on the individual orders.   Basic metabolic panel   Result Value Ref Range    Sodium 141 135 - 145 mmol/L    Potassium 3.8 3.4 - 5.3 mmol/L    Chloride 103 98 - 107 mmol/L    Carbon Dioxide (CO2) 28 22 - 29 mmol/L    Anion Gap 10 7 - 15 mmol/L    Urea Nitrogen 18.0 8.0 - 23.0 mg/dL    Creatinine 1.44 (H) 0.67 - 1.17 mg/dL    GFR Estimate 48 (L) >60 mL/min/1.73m2    Calcium 8.6 (L) 8.8 - 10.2 mg/dL    Glucose 226 (H) 70 - 99 mg/dL   CBC with platelets and differential   Result Value Ref Range    WBC Count 9.5 4.0 - 11.0 10e3/uL    RBC Count 3.19 (L) 4.40 - 5.90 10e6/uL    Hemoglobin 8.8 (L) 13.3 - 17.7 g/dL    Hematocrit 27.8 (L) 40.0 - 53.0 %    MCV 87 78 - 100 fL    MCH 27.6 26.5 - 33.0 pg    MCHC 31.7 31.5 - 36.5 g/dL    RDW 17.3 (H) 10.0 - 15.0 %    Platelet Count 232 150 - 450 10e3/uL    % Neutrophils 71 %    % Lymphocytes 8 %    % Monocytes 10 %    % Eosinophils 10 %    % Basophils 1 %    % Immature Granulocytes 0 %    NRBCs per 100 WBC 0 <1 /100    Absolute Neutrophils 6.7 1.6 - 8.3 10e3/uL    Absolute Lymphocytes 0.8 0.8 - 5.3 10e3/uL    Absolute Monocytes 0.9 0.0 - 1.3 10e3/uL    Absolute Eosinophils 1.0 (H) 0.0 - 0.7 10e3/uL    Absolute Basophils 0.1 0.0 - 0.2 10e3/uL     Absolute Immature Granulocytes 0.0 <=0.4 10e3/uL    Absolute NRBCs 0.0 10e3/uL   Glucose by meter   Result Value Ref Range    GLUCOSE BY METER POCT 261 (H) 70 - 99 mg/dL   Glucose by meter   Result Value Ref Range    GLUCOSE BY METER POCT 291 (H) 70 - 99 mg/dL       ASSESSMENT AND PLAN    Tc Garcia is a 83 year old right hand dominant male with a PMH of DM2 with DKA, paroxysmal atrial fibrillation formerly on apixaban, hypertension, hyperlipidemia, pelural effusion, CKD stage 3, BPH, and ACD who sustained a left cerebellar intracranial hemorrhage from a fall on 12/11/23 with a possible left medial frontal lobe infarct on imaging.  His deficits include decreased visual acuity and mild left hemiparesis. He was admitted to acute inpatient rehabilitation on 12/27/23.     Impairment group code: 02.22 Traumatic, Closed Injury; Fall with resulting intracranial hemorrhage        PT, OT and SLP 60 minutes of each on a daily basis, in addition to rehab nursing and close management of physiatrist.       Impairment of ADL's: OT for 60 min daily to work on upper and lower body self care, dressing, toileting, bathing, energy conservation techniques with use of ADs as needed.      Impairment of mobility:  PT for 60 min daily to work on gait exercises, strengthening, endurance buildup, transfers with use of walker as needed.      Impairment of cognition/language/swallow:  SLP for 60 min daily for cognitive evaluation and treatment strategies for higher level cognitive deficits and memory impairment.      Rehab RN to administer medication, patient education on medication taking, VS monitoring, bowel regimen, glucose monitoring and wound care/surgical wound dressing changes and monitoring.            Medical Conditions     #Traumatic brain injury  #Intraparenchymal hematoma    #Mild hydrocephalus   #H/o spontaneous intracranial hemorrhage (1/2023)  paroxysmal atrial fibrillation (on eliquis prior to admission)  Presented with  dizziness diplopia and nausea. Workup found 1.5 X 3 cm intraparenchymal hematoma centered in inferior cerebellar vermis with likely extension into fourth ventricle; no hydrocephalus. Etiology ruled likely hypertensive in setting of chronic anticoagulation after fall.    - follow up with stroke neurology 6-8 weeks from 12/27  --Had left hand weakness on 12/31 and stat head CT showed no new intracranial abnormalities  -Continue PT, OT, SLP  -Underwent neuropsych testing, awaiting final report.       #Chronic C7 compression fracture  No dynamic instability.        #Chronic diastolic CHF  #Atrial fibrillation PTA on Eliquis  #Hypertension  #Hyperlipidemia  Previously on apixaban, held after ICH. Anticoagulation was reversed and held. Echocardiogram with EF of 60 to 65%.  Severe biatrial enlargement.  Mild to moderate TR.  - lovenox ppx  - stroke neurology in 6-8 weeks  - BP goal systolic 130-150  - continue amlodipine  - continue carvedilol  - continue avapro  - hold PTA demadex  - follow up with cardiology in 1 month to discuss Watchman        #Diabetic ketoacidosis  #Diabetes mellitus type II HgbA1c 8.1% 12/12/23  Patient's insulin pump had been on hold since admission in the setting of IPH. Blood sugars labile during hospital stay. Patient went into DKA on 12/24/2023.  Likely triggered by infection with rise in the WBC count hence pump discontinued and patient switched to insulin drip per DKA protocol. DKA resolved on 12/25/2023, but then he had another episode of likely DKA on morning of 12/28/23 which may have been due to patient confusion about use of pump / injectable insulin  - Endo consulted and following. 1/02, restarted PTA insulin pump, but due to cognitive impairments to properly and safely administer insulin, giving excess or too little insulin, he was taken off of the insulin pump and placed on basal lantus and carb coverage novolog. As of 1/08 on:   --Lantus 7 units in PM   --Novolog 1unit/12g carbs with  breakfast. 1unit/18g carbs with lunch and supper   --Low SSI  --1/05, patient had poor sugar control overnight. Anticipate medication adjustments. Follow endocrinology recommendations     #Insomnia  -1/08 continue remelteon and melatonin      #Possible aspiration pneumonia.  Agitation, leukocytosis on 12/12 to 12/13. No growth blood cultures.  CXR R > L lower lung opacity.  Completed IV ceftriaxone - cefuroxime course.  - CXR in ~ 4 weeks (~1/27/24)  - Incentive spirometry  - aspiration precautions     #Onychomycosis  Ortho/podiatry consulted on 1/06. Podiatry rounds on 1/11 and will see him.     #Presumed sepsis likely due to UTI  Patient went into DKA with rising white count and mildly positive UA.  Of note, patient had just completed treatment for blood pneumonia UTI, question if this was not completely treated.  Urine culture with no growth. Started on vanc/zosyn and narrowed to ceftriaxone 12/27/23  - Completed 7 day total course of abx with ceftriaxone on 1/2/24        #CKD Stage 3  Baseline creatinine 1-1.1  - continue irbesartan  - consider readding torsemide prn for edema  - Creatinine 1/08 is 1.4. Encourage oral hydration. Will repeat creatinine 1/09, if elevated will likely give IV fluids.      #Anemia of chronic disease  - Continue to assess with Monday and Thursday labs.   -Hgb stable at 8.8 on 1/08     Adjustment to disability:  Clinical psychology consulted, to eval and treat  Bowel: PRN meds available  Bladder: Continent  DVT Prophylaxis: Lovenox  GI Prophylaxis: On regular diet, will assess for GI symptoms  Code: DNR/DNI  Disposition: Home vs. Alternate level of care.  ELOS:  1/13.  Follow up Appointments on Discharge:   -Primary care provider, Jessika Cordoba in 1-2 weeks from discharge  Stroke neurology in 6-8 weeks from 12/27/23  cardiology in 1 month from 12/27/23  CXR in ~ 1/27/24  diabetes follow up: Dr Asfi at [facility] clinic of Endocrinology       Seen and discussed with Dr. Red,  PM&R staff physician     [place], DO  PGY2  Physical Medicine and Rehabilitation-[facility]  Pager: [phone]

## 2024-01-08 NOTE — PROGRESS NOTES
Diabetes Consult Daily  Progress Note          Assessment/Plan:     HPI:  Tc Garcia is a 83 year old right hand dominant male with a relevant PMH of DM1 on insulin pump (history of DKA), paroxysmal atrial fibrillation on eliquis, HTN, HLD, CKD stage 3, recurrent pleural effusion, and history of spontaneous intracranial hemorrhage who sustained a left intraparenchymal hematoma after a fall on 12/11/23, and found to have left medial frontal lobe diffusion restriction with sequelae of  left sided weakness, lower extremity more than upper extremity, as well as increased blurriness of vision.    Assessment:   Type 1 Diabetes Mellitus who presented in DKA (resolved) with diabetes c/b nephropathy, neuropathy who is managed outpatient on [employer] amb pump and Lucho CGM. A1c 8.1% (less stringent control for elderly patients with sig medical complexities for safety)   2.   Labile BG    Hyperglycemia following breakfast and lunch yesterday, improved following dinner. No longer having tight glucose control overnight with less Lantus onboard. Will intensify breakfast and lunch carb coverage. Otherwise continue current regimen.     Plan:       -  Increase to Lantus 7 unit(s) q24 hours at 1800 hrs today 1/9/23.    -  Continue Novolog ICR 1:12 g cho with breakfast.  Resume NovoLog ICR 1:18 g at lunch and continue 1:18 g CHO with dinner and snacks.  Cover all intake. (If not hitting threshold, may need to switch to echo pen and do 0.5 unit(s) per 12.5 g cho dosing).    -  Reduce Novolog correction scale 1:65 >150 TID AC; 1:65 >200 HS and 0200     -  BG TID AC/HS and 0200    -  Ok to wear Lucho 2 - nursing must continue to do POC BG monitoring as ordered.  CGM not to be used for decision-making - per hospital policy.  - hypoglycemia protocol -please be sure to check blood sugar anytime Tc feels it may be low answer call lights promptly please.  - education needs : formal pump assessment/support  for restart.    - Outpatient diabetes follow up: Dr Asif at [facility] clinic of Endocrinology in [place].  -  Diabetes service will continue to follow, please do not hesitate to contact the team with any questions/concerns. First call after hours is to primary service.    -  Please notify inpatient diabetes service if changes are planned that will impact glycemia, such as NPO, starting/adjusting steroids, enteral feeding, parenteral feeding, dextrose fluids or procedures.     Plan discussed with patient, bedside RN/primary team via this note.         Interval History:     - Lucho 2 sensor , patient removed today during visit. He has more sensors being shipped to his house, family/friend will then bring to him.  - Continues to work with therapies   - Podiatry planning to see patient  for toe nail trimming.     Blood sugar is far above goal this morning at 261.  TDD last 24  hours 16 unit.  Prior 24: 28 units.    Low on  may have been due to stacked correction the day prior. Evening correction was also very strong 2 units led to 150 mg/dl drop in 4 hours.    Tc is concerned that his blood sugar has been up-and-down.  He reports that he feels low blood sugars in the 60s and has called when he was felt some low symptoms but staff have been hesitant to check his blood sugar outside of a lot of times.  He agrees that his meals are not spaced particularly while doing therapy is here and wonders if correction might be due to it being given closer to 3 hours rather than 4 to 5 hours.  He reports that he feels low blood sugars in the 60s or 50s generally not in the 70s and 80s though he possibly had symptoms with a blood sugar of 76 recently.  He is afraid that he ruined his chance to get back on his pump which is what he really wants to do because he tried to insert the site while sitting in bed and did not think that it went in well so removed it.  He would like to get back on his pump, he is not certain that he  "could do MDI injections on his own.  He has never been on a pump with automated insulin delivery.  He notes that he helps his wife with her cares.  She is unable to help him with his diabetes management.  He reports that his diet is really variable and generally quite poor here in the hospital.    He is undergoing neuropsychological testing today.  Notes indicate that his daughter is looking for assisted living facilities for her parents and working with social work.  From [facility]:  If possible - I believe that Tc can monitor his glucose via the CGM but he should receive oversight over the admission of insulin, whether through the pump or manually.           Planned Procedures/surgeries: none  D5W-containing solutions/medications: none        Nutrition:     Orders Placed This Encounter      Combination Diet Moderate Consistent Carb (60 g CHO per Meal) Diet; Regular Diet; Thin Liquids (level 0)        PTA Regimen:   INSULIN PUMP -  [employer] minimed  Sensitivity factor (midnight to midnight): 60  Bolus (units:gram): 9907-9140 = 1:9, 6159-6242 = 1:7, 4555-6597 = 1:7, 7039-8967 = 1:9, 3689-5406 = 1:13   Basal Rates and Start/End times:   Rate #1 =  0.45 units/hr from 0000 to 0200.   Rate #2 = 0.45 units/hr from 0201 to 0600.   Rate #3 = 0.625 units/hr from 0601 to 0900.   Rate #4 = 0.625 units/hr from 0901 to  1700   Rate #5 = 0.50 units/hr from 1701 to 2359.      Maintain blood glucose level within a specified target range  3341-4044 110-150  0959-7694 100-120  2637-5624 100-140    Active insulin time 4 hours           Review of Systems:   CC: denies all          Medications:   Steroid planning:  none       Physical Exam:   /64 (BP Location: Left arm, Patient Position: Semi-Bain's, Cuff Size: Adult Large)   Pulse 60   Temp 97.8  F (36.6  C) (Oral)   Resp 18   Ht 1.753 m (5' 9\")   Wt 63 kg (139 lb)   SpO2 97%   BMI 20.53 kg/m    General:   A&O, NAD, pleasant, resting in bed.  HEENT:  NC/AT. MMM, " EOMI, Anicteric. Hearing WNL.   Lungs:  unremarkable, no new cough, no SOB  ABD:   rounded, soft   Extremities:  Moving all extremities.  Skin:  warm and dry, no obvious lesions/rash/bleeding  Neuro:  No focal neurological deficits - waxing/waning cognition   Psych:   Cooperative, appropriate mood, good eye contact, congruent affect          Data:     Lab Results   Component Value Date    A1C 8.1 12/12/2023    A1C 8.0 04/20/2023    A1C 7.2 01/17/2023    A1C 7.7 11/20/2020    A1C 7.4 07/08/2020    A1C 8.2 04/25/2020    A1C 7.7 10/31/2019    A1C 7.2 06/30/2009        CBC RESULTS:   Recent Labs   Lab Test 01/04/24  0549   WBC 8.2   RBC 3.21*   HGB 8.8*   HCT 27.8*   MCV 87   MCH 27.4   MCHC 31.7   RDW 17.7*        Last Comprehensive Metabolic Panel:  Lab Results   Component Value Date     01/08/2024    POTASSIUM 3.8 01/08/2024    CHLORIDE 103 01/08/2024    CO2 28 01/08/2024    ANIONGAP 10 01/08/2024     (H) 01/08/2024    BUN 18.0 01/08/2024    CR 1.44 (H) 01/08/2024    GFRESTIMATED 48 (L) 01/08/2024    LLOYD 8.6 (L) 01/08/2024       40 minutes spent on the date of the encounter doing chart review, history and exam, coordinating care/communication, documentation and further activities per the note.  >50% time spent on the floor with patient and care givers.    It is my privilege to be involved in the care of the above patient.     Erin Powers PA-C, MPAS  Diabetes, Endocrinology, and Metabolism  [phone] pager  On [facility] (inpatient)  [phone] office (page if no answer)        To contact Endocrinology Diabetes Management service:   From [phone]: Kenan or page inpatient diabetes provider that is following the patient that day (see filed or incomplete progress notes/consult notes).  If uncertain of provider assignment: page job code [phone].  For nursing questions or updates from [phone], please first page the primary team.  Primary team provider will then reach out to the on-call  endocrinology fellow as needed.    Please notify inpatient diabetes service if changes are planned that will impact glycemia, such as changes to steroids, enteral feeding, parenteral feeding, dextrose fluids or procedures requiring prolonged NPO status.abetes service if changes are planned to steroids, enteral feeding, parenteral feeding, or if procedures are planned requiring prolonged NPO status.

## 2024-01-09 NOTE — PLAN OF CARE
Discharge Planner Post-Acute Rehab SLP:     Discharge Plan: Home with  SLP. 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 Subjectively, impaired reasoning, attention, immediate memory. Further assessment to be completed or deferred to neuropsych   Swallow: regular, thin (0) liquid. NO STRAWS     Assessment: Patient was educated on external memory aids including utilizing writing down tasks. Patient was engaged during memory task, and showed understanding after task was explained and minimal cues were given. Improvement throughout the task was noted and completed with an easy level of difficulty.     Other Barriers to Discharge (Family Training, etc): family training: IADL assist

## 2024-01-09 NOTE — PROGRESS NOTES
Diabetes Consult Daily  Progress Note          Assessment/Plan:     HPI:  Tc Garcia is a 83 year old right hand dominant male with a relevant PMH of DM1 on insulin pump (history of DKA), paroxysmal atrial fibrillation on eliquis, HTN, HLD, CKD stage 3, recurrent pleural effusion, and history of spontaneous intracranial hemorrhage who sustained a left intraparenchymal hematoma after a fall on 12/11/23, and found to have left medial frontal lobe diffusion restriction with sequelae of  left sided weakness, lower extremity more than upper extremity, as well as increased blurriness of vision.    Assessment:   Type 1 Diabetes Mellitus who presented in DKA (resolved) with diabetes c/b nephropathy, neuropathy who is managed outpatient on TransferGo amb pump and Lucho CGM. A1c 8.1% (less stringent control for elderly patients with sig medical complexities for safety)   2.   Labile BG        Plan:       -  continue Lantus 7 unit(s) q24 hours at 1800     -  increase Novolog ICR 1:12 g cho with breakfast--> to 1 per 10 grams.  NovoLog ICR 1:18 g at lunch and  1:18 g CHO with dinner and snacks.  Cover all intake. (If not hitting threshold, may need to switch to echo pen and do 0.5 unit(s) per 12.5 g cho dosing).    -  Reduce Novolog correction scale 1:65 >150 TID AC; 1:65 >200 HS and 0200     -  BG TID AC/HS and 0200    -  Ok to wear Lucho 2 - nursing must continue to do POC BG monitoring as ordered.  CGM not to be used for decision-making - per hospital policy.  - hypoglycemia protocol -please be sure to check blood sugar anytime Tc feels it may be low answer call lights promptly please.  - education needs : formal pump assessment/support for restart.    - Outpatient diabetes follow up: Dr Asif at [facility] Endocrinology in [place].  -  Diabetes service will continue to follow, please do not hesitate to contact the team with any questions/concerns. First call after hours is to primary  "service.    -  Please notify inpatient diabetes service if changes are planned that will impact glycemia, such as NPO, starting/adjusting steroids, enteral feeding, parenteral feeding, dextrose fluids or procedures.     Plan discussed with patient         Interval History:     - Lucho 2 sensor , patient removed . He has more sensors being shipped to his house, family/friend will then bring to him. He rports his  needs to be replaced and that a \"new one is coming.\"    - Continues to work with therapies   - Podiatry planning to see patient  for toe nail trimming.       Investigation into [facility] facilities. Pt's wife unable to provide him assist-- he helped her     From [place]:  If possible - I believe that Tc can monitor his glucose via the CGM but he should receive oversight over the admission of insulin, whether through the pump or manually.     Today, -- Tc expresses desire to have support with pump start.  It seems the appropriateness of this will depend on support avail at dispo    He was vry pleased and improved BG stability last even and overnight.      Planned Procedures/surgeries: none  D5W-containing solutions/medications: none        Nutrition:     Orders Placed This Encounter      Combination Diet Moderate Consistent Carb (60 g CHO per Meal) Diet; Regular Diet; Thin Liquids (level 0)        PTA Regimen:   INSULIN PUMP -  Medtronic minimed  Sensitivity factor (midnight to midnight): 60  Bolus (units:gram): 6948-5692 = 1:9, 3821-5488 = 1:7, 8234-9951 = 1:7, 5103-4753 = 1:9, 9784-4864 = 1:13   Basal Rates and Start/End times:   Rate #1 =  0.45 units/hr from 0000 to 0200.   Rate #2 = 0.45 units/hr from 0201 to 0600.   Rate #3 = 0.625 units/hr from 0601 to 0900.   Rate #4 = 0.625 units/hr from 0901 to  1700   Rate #5 = 0.50 units/hr from 1701 to 2359.      Maintain blood glucose level within a specified target range  3730-8391 110-150  9758-1442 100-120  5207-8821 " "100-140    Active insulin time 4 hours           Review of Systems:   Se interval hx         Medications:   Steroid planning:  none       Physical Exam:   /64 (BP Location: Right arm, Patient Position: Sitting, Cuff Size: Adult Large)   Pulse 67   Temp 97.6  F (36.4  C) (Oral)   Resp (!) 67   Ht 1.753 m (5' 9\")   Wt 63 kg (139 lb)   SpO2 99%   BMI 20.53 kg/m    General:   A&O, NAD, pleasant, resting in bed.  HEENT:  NC/AT.  Hearing WNL.   Lungs:  unlaboredd  ABD:   rounded  Neuro:  No focal neurological deficits - recall of pump challenges seems incomplete  Psych:   Cooperative, appropriate mood, good eye contact, congruent affect          Data:     Lab Results   Component Value Date    A1C 8.1 12/12/2023    A1C 8.0 04/20/2023    A1C 7.2 01/17/2023    A1C 7.7 11/20/2020    A1C 7.4 07/08/2020    A1C 8.2 04/25/2020    A1C 7.7 10/31/2019    A1C 7.2 06/30/2009      Last Comprehensive Metabolic Panel:  Lab Results   Component Value Date     01/08/2024    POTASSIUM 3.8 01/08/2024    CHLORIDE 103 01/08/2024    CO2 28 01/08/2024    ANIONGAP 10 01/08/2024     (H) 01/09/2024    BUN 18.0 01/08/2024    CR 1.44 (H) 01/08/2024    GFRESTIMATED 48 (L) 01/08/2024    LLOYD 8.6 (L) 01/08/2024       35 minutes spent on the date of the encounter doing chart review, history and exam, coordinating care/communication, documentation and further activities per the note.  Radha Bolaños APRN -[phone]          To contact Endocrinology Diabetes Management service:   From [phone]: Kenan or marcio inpatient diabetes provider that is following the patient that day (see filed or incomplete progress notes/consult notes).  If uncertain of provider assignment: marcio job code [phone].  For nursing questions or updates from [phone], please first page the primary team.  Primary team provider will then reach out to the on-call endocrinology fellow as needed.    Please notify inpatient diabetes service if changes are planned " that will impact glycemia, such as changes to steroids, enteral feeding, parenteral feeding, dextrose fluids or procedures requiring prolonged NPO status.abetes service if changes are planned to steroids, enteral feeding, parenteral feeding, or if procedures are planned requiring prolonged NPO status.

## 2024-01-09 NOTE — PLAN OF CARE
Goal Outcome Evaluation:       Overall Patient Progress: no change    Orientation: Alert and oriented this shift  Bowel: Continent LBM 1/8  Bladder: Continent  Pain: denied  Ambulation/Transfers: A1 with walker and gaitbelt  Blood sugars:151 and 171 with sliding scale and carb coverage  Diet/Liquids: Tolerating diet well; good appetite noted; took pills whole with water  Skin: Scab on left wrist, ERIN  Redness on sacrum/coccyx with mepilex dressing       Uses call light appropriately. Bed alarm on for safety. Continue POC.

## 2024-01-09 NOTE — PLAN OF CARE
Acute Rehab Care Conference/Team Rounds      Type: Team Rounds    Present: Dr. Paez, Resident Dr. Escalera, Dr. Maria T Ambriz Neuropsychologist, Elan Yost PT, Sia Walker OT, Mariposa Jang SLP, Denita Avery , Naz Johnson RD, Dariana Ramires RN, and Tc Garcia Patient.       Discharge Barriers/Treatment/Education    Rehab Diagnosis: fall with ICH     Active Medical Co-morbidities/Prognosis:     Patient Active Problem List   Diagnosis Code     DM type 2, Hgb A1C 8.2 on 4/25/20 E11.9     Mixed hyperlipidemia E78.2     Essential hypertension I10     Pain in limb M79.609     Plantar fascial fibromatosis M72.2     HYPERLIPIDEMIA LDL GOAL <100 E78.5     Hemothorax on left J94.2     Recurrent Left Pleural effusion -- S/P Pleurex Cath 5/12/20 J90     ABBIE (acute kidney injury) (H24) N17.9     Acute respiratory failure with hypoxia (H) J96.01     Pulmonary hypertension (H) I27.20     CRF (chronic renal failure), stage 3  N18.30     Anemia due to chronic kidney disease N18.9, D63.1     Atrial fibrillation/flutter -- on Eliquis and Amiodarone I48.91     DKA (diabetic ketoacidoses) E11.10     Anemia in CKD (chronic kidney disease) N18.9, D63.1     Dyspnea R06.00     SOB (shortness of breath) R06.02     Non-recurrent bilateral inguinal hernia without obstruction or gangrene K40.20     Acute cholecystitis K81.0     Abdominal pain, generalized R10.84     Non-intractable vomiting with nausea, unspecified vomiting type R11.2     Alcohol dependence (H) F10.20     CHF (congestive heart failure) (H) I50.9     Syncope and collapse R55     Weakness R53.1     Postoperative state Z98.890     Acute kidney injury (H24) N17.9     Chronic diastolic heart failure (H) I50.32     Anticoagulated Z79.01     Right-sided nontraumatic intraventricular intracerebral hemorrhage (H) I61.5     Dehydration E86.0     Hypoglycemia E16.2     Hypoxia R09.02     DNR (do not resuscitate) Z66     Hypotension, unspecified  hypotension type I95.9     Diabetic nephropathy associated with type 2 diabetes mellitus (H) E11.21     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, or unspecified(403.00) I12.9     Nephrotic range proteinuria R80.9     Secondary hyperparathyroidism of renal origin (H24) N25.81     Stage 3b chronic kidney disease (H) N18.32     Vitamin D deficiency E55.9     Diabetic ketoacidosis without coma associated with type 2 diabetes mellitus (H) E11.10     Intraparenchymal hemorrhage of brain (H) I61.9     Acute cystitis without hematuria N30.00     Constipation, unspecified constipation type K59.00     Pain in extremity, unspecified extremity M79.609     Type 2 diabetes mellitus with other specified complication, with long-term current use of insulin (H) E11.69, Z79.4     Nontraumatic intraventricular intracerebral hemorrhage, unspecified laterality (H) I61.5     Iron deficiency anemia D50.9     Intracranial hemorrhage (H) I62.9     Hyperglycemia R73.9     Inadequately controlled diabetes mellitus (H) E11.65       Safety: Is alert and intermittently confused with time especially. Forgetful. Mood is better tonight. Calls for needs. BG checks done timely for pt's bgs have been fluctuating. Bed alarm on.     Pain: Denied pain.     Medications, Skin, Tubes/Lines: Takes meds whole. Skin is intact. No lines/tubes.     Swallowing/Nutrition:  regular, thin (0) liquid. NO STRAWS. Goals met    Bowel/Bladder: Continent of both bowel and bladder. LBM 1/8/2024 to the bathroom.     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  Pt's spouse can physically assist, she has dementia and pt would like to get home as soon as possible due to this.  Pt was driving prior but not anymore. [facility] in the past.     ADLs/IADLs: Pt is making slow but steady progress with ADLs but is limited by impaired cognition and impaired balance. Pt requires CGA with G/h  tasks standing at sink with walker. Pt requires set-up with UBD and CGA with LBD tasks with walker. Pt requires CGA with toilet transfer with walker and min A with toilet hygiene. Recommending assistance with IADLs upon discharge d/t impaired cognition. HC OT services upon discharge.    Mobility: Pt is making slow progress with mobility and is impacted by cognitive and balance deficits. Currently CGA to min-A for transfers due to retropulsion with FWW. CGA for gait ~100' with FWW. Performance is variable. Due to functional cognition and balance, recommend 24/7 at discharge. Family putting a plan in place for likely [facility]. Will require FWW for home use, w/c if entering community.  Ibarra 1/1 = 20/56  Ibarra 1/8 = 24/56  10MWT - Comfortable Pace  - .35 m/s on 1/8    Cognition/Language:  subjectively noting mod-severe impaired reasoning, attention, immediate and working memory, new learning. WNL per CLQT upon admission but noting severe functional deficits within structured tasks. Further cognitive assessment completed by neuropsych, see their note for details. Demonstrating more confusion and variability. Do suspect mental health to impact somewhat as pt has been observed to feel over whelmed with discharge plan and care of spouse in his absence. Dtr looking into [facility]. Will require increased assist from family with all IADLs for himself and spouse. Ongoing SLP at [facility]    Community Re-Entry: Pt is home bound based on current cognitive and mobility status.    Transportation: Pt not a , family can provide    Decision maker: self and child    Plan of Care and goals reviewed and updated.    Discharge Plan/Recommendations    Fall Precautions: continue    Patient/Family input to goals: yes     Estimated length of stay: 18 days     Overall plan for the patient: reach a level of mod I       Utilization Review and Continued Stay Justification    Medical Necessity Criteria:    For any criteria that is not met, please document  reason and plan for discharge, transfer, or modification of plan of care to address.    Requires intensive rehabilitation program to treat functional deficits?: Yes    Requires 3x per week or greater involvement of rehabilitation physician to oversee rehabilitation program?: Yes    Requires rehabilitation nursing interventions?: Yes    Patient is making functional progress?: Yes    There is a potential for additional functional progress? Yes    Patient is participating in therapy 3 hours per day a minimum of 5 days per week or 15 hours per week in 7 day period?:Yes    Has discharge needs that require coordinated discharge planning approach?:Yes      Barriers/Concerns related to meeting medical necessity criteria:  None     Team Plan to Address Concern:  As needed      Final Physician Sign off    Statement of Approval: Charlie Paez, DO      Patient Goals  Social Work Goals: Confirm discharge recommendations with therapy, coordinate safe discharge plan and remain available to support and assist as needed.    OT Predicted Duration/Target Date for Goal Attainment: 01/12/24  Therapy Frequency (OT): 6 times/week  OT: Hygiene/Grooming: modified independent  OT: Upper Body Dressing: Modified independent  OT: Lower Body Dressing: Modified independent  OT: Upper Body Bathing: Modified independent  OT: Lower Body Bathing: Modified independent  OT: Bed Mobility: Modified independent  OT: Transfer: Modified independent (shower)  OT: Toilet Transfer/Toileting: Modified independent  OT: Meal Preparation: Modified independent  OT: Home Management: Modified independent  OT: Cognitive: Patient/caregiver will verbalize understanding of cognitive assessment results/recommendations as needed for safe discharge planning     PT Predicted Duration/Target Date for Goal Attainment: 01/12/24  PT Frequency: 6x/week  PT: Bed Mobility: Modified independent, Supine to/from sit, Rolling  PT: Transfers: Modified independent, Bed to/from  chair, Sit to/from stand  PT: Gait: Modified independent, Rolling walker, 150 feet  PT: Stairs: 3 stairs, Modified independent, Rail on right  PT: Goal 1: Pt to be mod indep with a medically appropriate HEP tolerating 30-60 min 3-5 x a week with a focus on standing balance, control  PT: Goal 2: Pt to be sba to mod I with advanced car transfer                      SLP Predicted Duration/Target Date for Goal Attainment: 01/06/24  Therapy Frequency (SLP Eval): 6 times/week  SLP: Safely tolerate diet without signs/symptoms of aspiration: Regular diet, Thin liquids, With use of swallow precautions MET  SLP: Goal 1: Pt will demonstrate the ability to manage medications and finances x 100% accuracy with occasional cues.                                           Goal: Mobility: Pt will progress to MOD I prior to discharge from ARU  Goal: Skin Integrity: Pt will be free from skin breakdown related to pressure and moisture throughout the ARU stay  Goal: Safety Management: Pt will be free from falls/harm throughout the ARU stay by using call light appropriately and waiting for assistance as indicated                        Goal Outcome Evaluation:           Overall Patient Progress: no changeOverall Patient Progress: no change

## 2024-01-09 NOTE — PLAN OF CARE
Discharge Planner Post-Acute Rehab PT:     Discharge Plan: Home with home care - care/assist TBD as pt is caregiver for his spouse    Precautions: Alarms    Current Status:  Bed Mobility: Supervision  Transfer: CGA/min A FWW  Gait: ' FWW  Stairs: CGA B rail  Balance: Able to stand w/o support - slight posterior lean, braces against objects w/ transfers  Fall Class completed:  1/6/24    Outcome Measures:   Ibarra 1/1 = 20/56  Ibarra 1/8 = 24/56    10MWT - Comfortable Pace  - .35 m/s on 1/8    Assessment: Pt still struggling with balance while stepping and R weight shifting. Pt was feeling fatigued at beginning of session and felt better at the end.     Other Barriers to Discharge (DME, Family Training, etc):   Caregiver role for his spouse  Cognition

## 2024-01-09 NOTE — PLAN OF CARE
FOCUS/GOAL  Bowel management, Bladder management, Pain management, Mobility, Cognition/Memory/Judgment/Problem solving, and Safety management    ASSESSMENT, INTERVENTIONS AND CONTINUING PLAN FOR GOAL:    Pt is alert, disoriented to time and place. Continent of bladder, voiding without difficulty using toilet. Continent of small BM this shift using the toilet. Denied pain or discomfort. Up assist of 1 with walker. Blood glucose levels checked and covered with ordered insulin. Pt uses call light appropriately, able to make needs known. Bed alarm on for safety.

## 2024-01-09 NOTE — PLAN OF CARE
"Discharge Planner Post-Acute Rehab SLP:     Discharge Plan: Home with  SLP. 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 Subjectively, impaired reasoning, attention, immediate memory. Further assessment to be completed or deferred to neuropsych   Swallow: regular, thin (0) liquid. NO STRAWS     Assessment: Patient engaged in iPad based task \"Lumosity\". In task requiring him to generate a solution with focus on correct sequencing, patient initially requiring mod to max level of cues in order to be successful. Over course of session, able to reduce the level of cueing though still only able to complete at an easy level of difficulty. Patient also engaged in iPad based task \"flow\". Patient was able to learn the task and able to show improvement with repetition in the task still completing at an easy level of difficulty.      Other Barriers to Discharge (Family Training, etc): family training: IADL assist    "

## 2024-01-09 NOTE — PROGRESS NOTES
[facility]   Acute Rehabilitation Unit    INTERVAL HISTORY  Notes and labs reviewed over past 24 hours. No acute overnight events..    Patient was seen and examined. He reports he feels better today. He admits his mood is largely based of concern of who can care for his wife if he is unable to go home. We discussed at length options for him and family to pursue during team rounds.     His sugars have been stable over past 24 hours.     ROS: 10 point ROS was assessed and was negative unless otherwise stated in HPI      Functionally,    With PT: 1/08    Current Status:  Bed Mobility: Supervision  Transfer: CGA/min-A FWW  Gait: ' FWW  Stairs: CGA B rail  Balance: Able to stand w/o support - slight posterior lean, braces against objects w/ transfers  Fall Class completed:  1/6/24     Outcome Measures:   Ibarra 1/1 = 20/56  Ibarra 1/8 = 24/56     10MWT - Comfortable Pace  - .35 m/s on 1/8     Assessment: Added 3mm heel lift with slight improvement in posterior lean. Most prominent difficulty with R stance phase    With OT: 1/08    Current Status:  ADLs:  Mobility: CGA/min A with walker d/t  posterior leaning  Grooming: CGA standing with walker, set-up seated  Dressing: SBA with UBD seated, CGA with LBD tasks with walker. Min A with slippers, dependent with spandi  on BLE's   Bathing: Recommend tub bench-pt declined initial shower  Toileting: CGA/min A with walker  IADLs: Unable to correctly setup 0/4 medications with med box. Anticipate assistance with med mgmt upon discharge.   Vision/Cognition: further assessment    With SLP: 1/08    Current Status:  Hearing: WFL  Vision: Glasses  Communication: WFL  Cognition: WNL per CLQT. Subjectively, impaired reasoning, attention, immediate memory. Further assessment to be completed or deferred to neuropsych   Swallow: regular, thin (0) liquid. NO STRAWS      Assessment:  Pt on family call upon arrival. Pt reported spouse (with  "dementia who he was caring for at baseline) was very agitated with dtr and dc plan for different level of care. Pt acknowledges need for increased care and assist level but expressed concern for spouse's understanding.           MEDICATIONS  Scheduled meds   amLODIPine  10 mg Oral Daily    carvedilol  12.5 mg Oral BID w/meals    enoxaparin ANTICOAGULANT  40 mg Subcutaneous Q24H    insulin aspart   Subcutaneous Daily with breakfast    insulin aspart   Subcutaneous Daily with lunch    insulin aspart   Subcutaneous Daily with supper    insulin aspart  1-7 Units Subcutaneous TID AC    insulin aspart  1-5 Units Subcutaneous BID    insulin glargine  7 Units Subcutaneous Q24H    irbesartan  300 mg Oral At Bedtime    isosorbide mononitrate  60 mg Oral QPM    ramelteon  8 mg Oral At Bedtime    tamsulosin  0.4 mg Oral QAM       PRN meds:  acetaminophen, glucose **OR** dextrose **OR** glucagon, insulin aspart, menthol (Topical Analgesic) 2.5%, - MEDICATION INSTRUCTIONS -, senna-docusate      PHYSICAL EXAM  /64 (BP Location: Right arm, Patient Position: Sitting, Cuff Size: Adult Large)   Pulse 67   Temp 97.6  F (36.4  C) (Oral)   Resp (!) 67   Ht 1.753 m (5' 9\")   Wt 63 kg (139 lb)   SpO2 99%   BMI 20.53 kg/m    General: in no acute distress, conversational and alert, resting comfortably in bed  HEENT: atraumatic, nares clear, conjunctiva white  Pulmonary: on room air, lungs clear to auscultation bilaterally  Cardiovascular: RRR, no murmur auscultated, well-perfused peripherally  Abdominal: soft, non-tender to palpation, non-distended  Extremities: warm peripherally, no edema in BLEs  Skin: warm, dry, without erythema, ecchymosis, or rash noted  MSK: Moves all four extremities volitionally and against gravity.   Neuro: conjugate gaze, speech non-dysarthric,and comprehensible       LABS  Results for orders placed or performed during the hospital encounter of 12/29/23 (from the past 24 hour(s))   Glucose by meter "   Result Value Ref Range    GLUCOSE BY METER POCT 261 (H) 70 - 99 mg/dL   Glucose by meter   Result Value Ref Range    GLUCOSE BY METER POCT 291 (H) 70 - 99 mg/dL   Glucose by meter   Result Value Ref Range    GLUCOSE BY METER POCT 221 (H) 70 - 99 mg/dL   Glucose by meter   Result Value Ref Range    GLUCOSE BY METER POCT 151 (H) 70 - 99 mg/dL   Glucose by meter   Result Value Ref Range    GLUCOSE BY METER POCT 171 (H) 70 - 99 mg/dL   Glucose by meter   Result Value Ref Range    GLUCOSE BY METER POCT 120 (H) 70 - 99 mg/dL       ASSESSMENT AND PLAN    Tc Garcia is a 83 year old right hand dominant male with a PMH of DM2 with DKA, paroxysmal atrial fibrillation formerly on apixaban, hypertension, hyperlipidemia, pelural effusion, CKD stage 3, BPH, and ACD who sustained a left cerebellar intracranial hemorrhage from a fall on 12/11/23 with a possible left medial frontal lobe infarct on imaging.  His deficits include decreased visual acuity and mild left hemiparesis. He was admitted to acute inpatient rehabilitation on 12/27/23.     Impairment group code: 02.22 Traumatic, Closed Injury; Fall with resulting intracranial hemorrhage        PT, OT and SLP 60 minutes of each on a daily basis, in addition to rehab nursing and close management of physiatrist.       Impairment of ADL's: OT for 60 min daily to work on upper and lower body self care, dressing, toileting, bathing, energy conservation techniques with use of ADs as needed.      Impairment of mobility:  PT for 60 min daily to work on gait exercises, strengthening, endurance buildup, transfers with use of walker as needed.      Impairment of cognition/language/swallow:  SLP for 60 min daily for cognitive evaluation and treatment strategies for higher level cognitive deficits and memory impairment.      Rehab RN to administer medication, patient education on medication taking, VS monitoring, bowel regimen, glucose monitoring and wound care/surgical wound dressing  changes and monitoring.            Medical Conditions     #Traumatic brain injury  #Intraparenchymal hematoma    #Mild hydrocephalus   #H/o spontaneous intracranial hemorrhage (1/2023)  paroxysmal atrial fibrillation (on eliquis prior to admission)  Presented with dizziness diplopia and nausea. Workup found 1.5 X 3 cm intraparenchymal hematoma centered in inferior cerebellar vermis with likely extension into fourth ventricle; no hydrocephalus. Etiology ruled likely hypertensive in setting of chronic anticoagulation after fall.    - follow up with stroke neurology 6-8 weeks from 12/27  --Had left hand weakness on 12/31 and stat head CT showed no new intracranial abnormalities  -Continue PT, OT, SLP  -Underwent neuropsych testing, awaiting final report.       #Chronic C7 compression fracture  No dynamic instability.        #Chronic diastolic CHF  #Atrial fibrillation PTA on Eliquis  #Hypertension  #Hyperlipidemia  Previously on apixaban, held after ICH. Anticoagulation was reversed and held. Echocardiogram with EF of 60 to 65%.  Severe biatrial enlargement.  Mild to moderate TR.  - lovenox ppx  - stroke neurology in 6-8 weeks  - BP goal systolic 130-150  - continue amlodipine  - continue carvedilol  - continue avapro  - hold PTA demadex  - follow up with cardiology in 1 month to discuss Watchman        #Diabetic ketoacidosis  #Diabetes mellitus type II HgbA1c 8.1% 12/12/23  Patient's insulin pump had been on hold since admission in the setting of IPH. Blood sugars labile during hospital stay. Patient went into DKA on 12/24/2023.  Likely triggered by infection with rise in the WBC count hence pump discontinued and patient switched to insulin drip per DKA protocol. DKA resolved on 12/25/2023, but then he had another episode of likely DKA on morning of 12/28/23 which may have been due to patient confusion about use of pump / injectable insulin  - Endo consulted and following. 1/02, restarted PTA insulin pump, but due to  cognitive impairments to properly and safely administer insulin, giving excess or too little insulin, he was taken off of the insulin pump and placed on basal lantus and carb coverage novolog. As of 1/08 on:   --Lantus 7 units in PM   --Novolog 1unit/12g carbs with breakfast. 1unit/18g carbs with lunch and supper   --Low SSI     #Adjustment Disorder   Patient is feeling down and hopless with what he feels is little progression functionally. Reporting he is worried about his health status and what will happen to his wife if he is unable to care for her.   -Clinical Psychology consult placed to evaluate and treat      #Insomnia  -1/08 continue remelteon and melatonin      #Possible aspiration pneumonia.  Agitation, leukocytosis on 12/12 to 12/13. No growth blood cultures.  CXR R > L lower lung opacity.  Completed IV ceftriaxone - cefuroxime course.  - CXR in ~ 4 weeks (~1/27/24)  - Incentive spirometry  - aspiration precautions     #Onychomycosis  Ortho/podiatry consulted on 1/06. Podiatry rounds on 1/11 and will see him.     #Presumed sepsis likely due to UTI  Patient went into DKA with rising white count and mildly positive UA.  Of note, patient had just completed treatment for blood pneumonia UTI, question if this was not completely treated.  Urine culture with no growth. Started on vanc/zosyn and narrowed to ceftriaxone 12/27/23  - Completed 7 day total course of abx with ceftriaxone on 1/2/24        #CKD Stage 3  Baseline creatinine 1-1.1  - continue irbesartan  - consider readding torsemide prn for edema  - Creatinine 1/08 is 1.4. Continue to Encourage oral hydration. Will repeat creatinine 1/10, if elevated will likely give IV fluids.      #Anemia of chronic disease  - Continue to assess with Monday and Thursday labs.   -Hgb stable at 8.8 on 1/08     Adjustment to disability:  Clinical psychology consulted, to eval and treat  Bowel: PRN meds available  Bladder: Continent  DVT Prophylaxis: Lovenox  GI  Prophylaxis: On regular diet, will assess for GI symptoms  Code: DNR/DNI  Disposition: Home vs. Alternate level of care.  ELOS:  1/13. Pending disposition  Follow up Appointments on Discharge:   -Primary care provider, Jessika Cordoba in 1-2 weeks from discharge  Stroke neurology in 6-8 weeks from 12/27/23  cardiology in 1 month from 12/27/23  CXR in ~ 1/27/24  diabetes follow up: Dr Asif at [facility] of Endocrinology       Seen and discussed with Dr. Paez, PM&R staff physician     [place],   PGY2  Physical Medicine and Rehabilitation-[facility]  Pager: [phone]

## 2024-01-09 NOTE — PROGRESS NOTES
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 SBA with footwear seated   Bathing: Recommend tub bench-pt declined initial shower  Toileting: CGA with toilet transfer, and min A with toilet hygiene with walker.   IADLs: Unable to correctly setup 0/4 medications with med box. Anticipate assistance with med mgmt upon discharge.   Vision/Cognition: further assessment     Assessment: Focused on ADL routine with walker. Pt demonstrating increased IND with FB dressing tasks. Completed shaving task seated at sink with min A and increased time.     Other Barriers to Discharge (DME, Family Training, etc): Pt reports having shower chair at home.   Family training prior to discharge

## 2024-01-10 NOTE — PLAN OF CARE
"Discharge Planner Post-Acute Rehab SLP:     Discharge Plan: Home with HH SLP. 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 Moderate reasoning, mild attention and memory deficits during tasks.   Swallow: regular, thin (0) liquid. NO STRAWS     Assessment: Pt asleep when SLP arrived, roused with verbal stim but noted to \"doze off throughout session. Defer [place] training for when he is fully alert. Pt re-educated in memory strategies with WARM acronym, instructions/details added to visual aide. Instructed pt in structured practice of repetition memory strategy, after 3x learning opportunities pt recalled 40% details after delay. With semantic and multiple chohie cues, 100% recalled, consistent with decreased attention likely due to fatigue.     Other Barriers to Discharge (Family Training, etc): family training: IADL assist    "

## 2024-01-10 NOTE — PLAN OF CARE
Discharge Planner Post-Acute Rehab SLP:      Discharge Plan: Home with  SLP. 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 Moderate reasoning, mild attention and memory deficits during tasks.   Swallow: regular, thin (0) liquid. NO STRAWS      Assessment: Pt seen for cognitive communication tx. Pt is fatigued but able to participate. Intervention targeted categorical reasoning task. Given three related words, pt was able to identify their category with 95% accuracy IND. Increased time for processing information needed.     Other Barriers to Discharge (Family Training, etc): family training: IADL assist

## 2024-01-10 NOTE — PLAN OF CARE
Goal Outcome Evaluation:      Plan of Care Reviewed With: patient    Overall Patient Progress: no change    Alert and oriented x3, disoriented with time. Able to make his needs known and is call light appropriate. Pleasant, calm and cooperative with cares. Denies pain, sob, N/V. Up with assist of 1 with a walker and transfer belt. Continent of both, had 1 BM today. HS blood glucose is 154, no coverage per sliding scale.  Safety precautions maintained, bed alarm on and call light is placed within reach.

## 2024-01-10 NOTE — PLAN OF CARE
Goal Outcome Evaluation:    Overall Patient Progress: no changeOverall Patient Progress: no change    Pt is alert and disoriented to time. Make needs known by using the call light.  No complaints of pain, sob, fever, n/v or any new weakness. No impulsive behavior.   Transfers as A1 with the walker. Waits for staff assistance.  Continent of bowel and bladder to the bathroom. Lbm 1/10/24.  BG at 2am is 130. Hourly rounds done and appears comfortably sleeping tonight.   No issues or expressed needs at this time. VSS. Continue poc.

## 2024-01-10 NOTE — PROGRESS NOTES
"  [facility]   Acute Rehabilitation Unit    INTERVAL HISTORY  Patient seen this AM.  No acute events overnight.  Denies chest pain, shortness of breath, no fever or chills.  Reviewed goals to discharge safely.  Discussed recovery as well as need for supervision.  Appreciate support from Endocrine team with complicated DM1.    ROS: 10 point ROS was assessed and was negative unless otherwise stated in HPI      Functional  PT:  Bed Mobility: Supervision  Transfer: CGA/min A FWW  Gait: ' FWW  Stairs: CGA B rail  Balance: Able to stand w/o support - slight posterior lean, braces against objects w/ transfers  Fall Class completed:  1/6/24     Outcome Measures:   Ibarra 1/1 = 20/56  Ibarra 1/8 = 24/56     10MWT - Comfortable Pace  - .35 m/s on 1/8          MEDICATIONS  Scheduled meds   amLODIPine  10 mg Oral Daily    carvedilol  12.5 mg Oral BID w/meals    enoxaparin ANTICOAGULANT  40 mg Subcutaneous Q24H    insulin aspart   Subcutaneous Daily with breakfast    insulin aspart   Subcutaneous Daily with lunch    insulin aspart   Subcutaneous Daily with supper    insulin aspart  1-7 Units Subcutaneous TID AC    insulin aspart  1-5 Units Subcutaneous BID    insulin glargine  7 Units Subcutaneous Q24H    irbesartan  300 mg Oral At Bedtime    isosorbide mononitrate  60 mg Oral QPM    ramelteon  8 mg Oral At Bedtime    tamsulosin  0.4 mg Oral QAM       PRN meds:  acetaminophen, glucose **OR** dextrose **OR** glucagon, insulin aspart, menthol (Topical Analgesic) 2.5%, - MEDICATION INSTRUCTIONS -, senna-docusate      PHYSICAL EXAM  /62 (BP Location: Left arm, Patient Position: Sitting, Cuff Size: Adult Regular)   Pulse 70   Temp 97.8  F (36.6  C) (Oral)   Resp 16   Ht 1.753 m (5' 9\")   Wt 63 kg (139 lb)   SpO2 92%   BMI 20.53 kg/m    General: in no acute distress, conversational and alert, resting comfortably in bed  HEENT: atraumatic, nares clear, conjunctiva white  Pulmonary: " on room air, lungs clear to auscultation bilaterally  Cardiovascular: RRR, no murmur auscultated, well-perfused peripherally  Abdominal: soft, non-tender to palpation, non-distended  Extremities: warm peripherally, no edema in BLEs  Skin: warm, dry, without erythema, ecchymosis, or rash noted  MSK: Moves all four extremities volitionally and against gravity.   Neuro: conjugate gaze, speech non-dysarthric,and comprehensible       LABS  Results for orders placed or performed during the hospital encounter of 12/29/23 (from the past 24 hour(s))   Glucose by meter   Result Value Ref Range    GLUCOSE BY METER POCT 231 (H) 70 - 99 mg/dL   Glucose by meter   Result Value Ref Range    GLUCOSE BY METER POCT 242 (H) 70 - 99 mg/dL   Glucose by meter   Result Value Ref Range    GLUCOSE BY METER POCT 154 (H) 70 - 99 mg/dL   Glucose by meter   Result Value Ref Range    GLUCOSE BY METER POCT 130 (H) 70 - 99 mg/dL   Basic metabolic panel   Result Value Ref Range    Sodium 139 135 - 145 mmol/L    Potassium 4.0 3.4 - 5.3 mmol/L    Chloride 105 98 - 107 mmol/L    Carbon Dioxide (CO2) 29 22 - 29 mmol/L    Anion Gap 5 (L) 7 - 15 mmol/L    Urea Nitrogen 19.9 8.0 - 23.0 mg/dL    Creatinine 1.38 (H) 0.67 - 1.17 mg/dL    GFR Estimate 50 (L) >60 mL/min/1.73m2    Calcium 8.8 8.8 - 10.2 mg/dL    Glucose 192 (H) 70 - 99 mg/dL   Extra Tube    Narrative    The following orders were created for panel order Extra Tube.  Procedure                               Abnormality         Status                     ---------                               -----------         ------                     Extra Purple Top Tube[[id number]]                            Final result                 Please view results for these tests on the individual orders.   Extra Purple Top Tube   Result Value Ref Range    Hold Specimen JI    Glucose by meter   Result Value Ref Range    GLUCOSE BY METER POCT 235 (H) 70 - 99 mg/dL       ASSESSMENT AND PLAN    Tc MACDONALD Garcia is a 83  year old right hand dominant male with a PMH of DM2 with DKA, paroxysmal atrial fibrillation formerly on apixaban, hypertension, hyperlipidemia, pelural effusion, CKD stage 3, BPH, and ACD who sustained a left cerebellar intracranial hemorrhage from a fall on 12/11/23 with a possible left medial frontal lobe infarct on imaging.  His deficits include decreased visual acuity and mild left hemiparesis. He was admitted to acute inpatient rehabilitation on 12/27/23.     Impairment group code: 02.22 Traumatic, Closed Injury; Fall with resulting intracranial hemorrhage        PT, OT and SLP 60 minutes of each on a daily basis, in addition to rehab nursing and close management of physiatrist.       Impairment of ADL's: OT for 60 min daily to work on upper and lower body self care, dressing, toileting, bathing, energy conservation techniques with use of ADs as needed.      Impairment of mobility:  PT for 60 min daily to work on gait exercises, strengthening, endurance buildup, transfers with use of walker as needed.      Impairment of cognition/language/swallow:  SLP for 60 min daily for cognitive evaluation and treatment strategies for higher level cognitive deficits and memory impairment.      Rehab RN to administer medication, patient education on medication taking, VS monitoring, bowel regimen, glucose monitoring and wound care/surgical wound dressing changes and monitoring.            Medical Conditions     #Traumatic brain injury  #Intraparenchymal hematoma    #Mild hydrocephalus   #H/o spontaneous intracranial hemorrhage (1/2023)  paroxysmal atrial fibrillation (on eliquis prior to admission)  Presented with dizziness diplopia and nausea. Workup found 1.5 X 3 cm intraparenchymal hematoma centered in inferior cerebellar vermis with likely extension into fourth ventricle; no hydrocephalus. Etiology ruled likely hypertensive in setting of chronic anticoagulation after fall.    - follow up with stroke neurology 6-8 weeks  from 12/27  --Had left hand weakness on 12/31 and stat head CT showed no new intracranial abnormalities  -Continue PT, OT, SLP  -Underwent neuropsych testing, awaiting final report.       #Chronic C7 compression fracture  No dynamic instability.        #Chronic diastolic CHF  #Atrial fibrillation PTA on Eliquis  #Hypertension  #Hyperlipidemia  Previously on apixaban, held after ICH. Anticoagulation was reversed and held. Echocardiogram with EF of 60 to 65%.  Severe biatrial enlargement.  Mild to moderate TR.  - lovenox ppx  - stroke neurology in 6-8 weeks  - BP goal systolic 130-150  - continue amlodipine  - continue carvedilol  - continue avapro  - hold PTA demadex  - follow up with cardiology in 1 month to discuss Watchman        #Diabetic ketoacidosis  #Diabetes mellitus type II HgbA1c 8.1% 12/12/23  Patient's insulin pump had been on hold since admission in the setting of IPH. Blood sugars labile during hospital stay. Patient went into DKA on 12/24/2023.  Likely triggered by infection with rise in the WBC count hence pump discontinued and patient switched to insulin drip per DKA protocol. DKA resolved on 12/25/2023, but then he had another episode of likely DKA on morning of 12/28/23 which may have been due to patient confusion about use of pump / injectable insulin  - Endo consulted and following. 1/02, restarted PTA insulin pump, but due to cognitive impairments to properly and safely administer insulin, giving excess or too little insulin, he was taken off of the insulin pump and placed on basal lantus and carb coverage novolog. As of 1/09 on:   --  continue Lantus 7 unit(s) q24 hours at 1800     -  increase Novolog ICR 1:12 g cho with breakfast--> to 1 per 10 grams.  NovoLog ICR 1:18 g at lunch and  1:18 g CHO with dinner and snacks.  Cover all intake. (If not hitting threshold, may need to switch to echo pen and do 0.5 unit(s) per 12.5 g cho dosing).    -  Reduce Novolog correction scale 1:65 >150 TID AC;  1:65 >200 HS and 0200     -  BG TID AC/HS and 0200     #Adjustment Disorder   Patient is feeling down and hopless with what he feels is little progression functionally. Reporting he is worried about his health status and what will happen to his wife if he is unable to care for her.   -Clinical Psychology consult placed to evaluate and treat      #Insomnia  -1/08 continue remelteon and melatonin      #Possible aspiration pneumonia.  Agitation, leukocytosis on 12/12 to 12/13. No growth blood cultures.  CXR R > L lower lung opacity.  Completed IV ceftriaxone - cefuroxime course.  - CXR in ~ 4 weeks (~1/27/24)  - Incentive spirometry  - aspiration precautions     #Onychomycosis  Ortho/podiatry consulted on 1/06. Podiatry rounds on 1/11 and will see him.     #Presumed sepsis likely due to UTI  Patient went into DKA with rising white count and mildly positive UA.  Of note, patient had just completed treatment for blood pneumonia UTI, question if this was not completely treated.  Urine culture with no growth. Started on vanc/zosyn and narrowed to ceftriaxone 12/27/23  - Completed 7 day total course of abx with ceftriaxone on 1/2/24        #CKD Stage 3  Baseline creatinine 1-1.1  - continue irbesartan  - consider readding torsemide prn for edema  - Creatinine 1/08 is 1.4. Continue to Encourage oral hydration. Will repeat creatinine 1/10, if elevated will likely give IV fluids.      #Anemia of chronic disease  - Continue to assess with Monday and Thursday labs.   -Hgb stable at 8.8 on 1/08     Adjustment to disability:  Clinical psychology consulted, to eval and treat  Bowel: PRN meds available  Bladder: Continent  DVT Prophylaxis: Lovenox  GI Prophylaxis: On regular diet, will assess for GI symptoms  Code: DNR/DNI  Disposition: Home vs. Alternate level of care.  ELOS:  1/13. Pending disposition  Follow up Appointments on Discharge:   -Primary care provider, Jessika Cordoba in 1-2 weeks from discharge  Stroke  neurology in 6-8 weeks from 12/27/23  cardiology in 1 month from 12/27/23  CXR in ~ 1/27/24  diabetes follow up: Dr Asif at \[facility]\"" clinic of Endocrinology             Charlie Paez DO  Physical Medicine and Rehabilitation-[facility]

## 2024-01-10 NOTE — PROGRESS NOTES
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 SBA with footwear seated   Bathing: Recommend tub bench-pt declined initial shower  Toileting: CGA with toilet transfer, and min A with toilet hygiene with walker.   IADLs: Unable to correctly setup 0/4 medications with med box. Anticipate assistance with med mgmt upon discharge.   Vision/Cognition: further assessment     Assessment: Focused on ADL routine with walker. Educated pt on energy conservation strategies to implement into daily routine and self-cares.      Other Barriers to Discharge (DME, Family Training, etc): Pt reports having shower chair at home.   Family training prior to discharge

## 2024-01-10 NOTE — PROGRESS NOTES
Diabetes Consult Daily  Progress Note          Assessment/Plan:     HPI:  Tc Garcia is a 83 year old right hand dominant male with a relevant PMH of DM1 on insulin pump (history of DKA), paroxysmal atrial fibrillation on eliquis, HTN, HLD, CKD stage 3, recurrent pleural effusion, and history of spontaneous intracranial hemorrhage who sustained a left intraparenchymal hematoma after a fall on 12/11/23, and found to have left medial frontal lobe diffusion restriction with sequelae of  left sided weakness, lower extremity more than upper extremity, as well as increased blurriness of vision.    Assessment:   Type 1 Diabetes Mellitus who presented in DKA (resolved) with diabetes c/b nephropathy, neuropathy who is managed outpatient on PO-MO amb pump and Lucho CGM. A1c 8.1% (less stringent control for elderly patients with sig medical complexities for safety)   2.   Labile BG        Plan:       -  continue Lantus 7 unit(s) q24 hours at 1800     -  increase Novolog ICR  1 per 10 grams.    Continue NovoLog ICR 1:18 g at lunch and  1:18 g CHO with dinner and snacks.    Cover all intake. (    - Continue Novolog correction scale 1:65 >150 TID AC; 1:65 >200 HS and 0200     -  BG TID AC/HS and 0200    -  Ok to wear Lucho 2 - nursing must continue to do POC BG monitoring as ordered.  CGM not to be used for decision-making - per hospital policy.  - hypoglycemia protocol -please be sure to check blood sugar anytime Tc feels it may be low answer call lights promptly please.  - education needs : formal pump assessment/support for restart.    - Outpatient diabetes follow up: Dr Asif at [facility] clinic of Endocrinology in [place].  -  Diabetes service will continue to follow, please do not hesitate to contact the team with any questions/concerns. First call after hours is to primary service.    -  Please notify inpatient diabetes service if changes are planned that will impact glycemia, such as  "NPO, starting/adjusting steroids, enteral feeding, parenteral feeding, dextrose fluids or procedures.     Plan discussed with patient         Interval History:     Patient cannot remember to call RN when he needs carb coverage.  Concerned about letting him give his own bolus and manage his pump  Says he thinks he eat something early this am.  NO change today  - Lucho 2 sensor , patient removed . He has more sensors being shipped to his house, family/friend will then bring to him. He rports his  needs to be replaced and that a \"new one is coming.\"    - Continues to work with therapies   - Podiatry planning to see patient  for toe nail trimming.       Investigation into [facility] facilities. Pt's wife unable to provide him assist-- he helped her     From [facility]:  If possible - I believe that Tc can monitor his glucose via the CGM but he should receive oversight over the admission of insulin, whether through the pump or manually.         Planned Procedures/surgeries: none  D5W-containing solutions/medications: none        Nutrition:     Orders Placed This Encounter      Combination Diet Moderate Consistent Carb (60 g CHO per Meal) Diet; Regular Diet; Thin Liquids (level 0)        PTA Regimen:   INSULIN PUMP -  [employer] minimed  Sensitivity factor (midnight to midnight): 60  Bolus (units:gram): 9915-7316 = 1:9, 4848-1399 = 1:7, 6351-1846 = 1:7, 5014-7812 = 1:9, 6806-2208 = 1:13   Basal Rates and Start/End times:   Rate #1 =  0.45 units/hr from 0000 to 0200.   Rate #2 = 0.45 units/hr from 0201 to 0600.   Rate #3 = 0.625 units/hr from 0601 to 0900.   Rate #4 = 0.625 units/hr from 0901 to  1700   Rate #5 = 0.50 units/hr from 1701 to 2359.      Maintain blood glucose level within a specified target range  0374-5540 110-150  5401-6547 100-120  7072-6453 100-140    Active insulin time 4 hours           Review of Systems:   Se interval hx         Medications:   Steroid planning:  none       Physical " "Exam:   /62 (BP Location: Left arm, Patient Position: Sitting, Cuff Size: Adult Regular)   Pulse 70   Temp 97.8  F (36.6  C) (Oral)   Resp 16   Ht 1.753 m (5' 9\")   Wt 63 kg (139 lb)   SpO2 92%   BMI 20.53 kg/m    General:   A&O, NAD, pleasant, resting in bed.  HEENT:  NC/AT.  Hearing WNL.   Lungs:  unlaboredd  ABD:   rounded  Neuro:  No focal neurological deficits - recall of pump challenges seems incomplete  Psych:   Cooperative, appropriate mood, good eye contact, congruent affect          Data:     Lab Results   Component Value Date    A1C 8.1 12/12/2023    A1C 8.0 04/20/2023    A1C 7.2 01/17/2023    A1C 7.7 11/20/2020    A1C 7.4 07/08/2020    A1C 8.2 04/25/2020    A1C 7.7 10/31/2019    A1C 7.2 06/30/2009      Last Comprehensive Metabolic Panel:  Lab Results   Component Value Date     01/10/2024    POTASSIUM 4.0 01/10/2024    CHLORIDE 105 01/10/2024    CO2 29 01/10/2024    ANIONGAP 5 (L) 01/10/2024     (H) 01/10/2024    BUN 19.9 01/10/2024    CR 1.38 (H) 01/10/2024    GFRESTIMATED 50 (L) 01/10/2024    LLOYD 8.8 01/10/2024       35 minutes spent on the date of the encounter doing chart review, history and exam, coordinating care/communication, documentation and further activities per the note.  Corin Huertas PA-C  Inpatient Diabetes Service  Pager   [phone]  Available by Kenan  Date of Service: 1/10/2024            To contact Endocrinology Diabetes Management service:   From [phone]: Kenan or marcio inpatient diabetes provider that is following the patient that day (see filed or incomplete progress notes/consult notes).  If uncertain of provider assignment: page job code [phone].  For nursing questions or updates from [phone], please first page the primary team.  Primary team provider will then reach out to the on-call endocrinology fellow as needed.    Please notify inpatient diabetes service if changes are planned that will impact glycemia, such as changes to steroids, enteral " feeding, parenteral feeding, dextrose fluids or procedures requiring prolonged NPO status.abetes service if changes are planned to steroids, enteral feeding, parenteral feeding, or if procedures are planned requiring prolonged NPO status.

## 2024-01-10 NOTE — PLAN OF CARE
Discharge Planner Post-Acute Rehab PT:     Discharge Plan: Home with home care - care/assist TBD as pt is caregiver for his spouse    Precautions: Alarms    Current Status:  Bed Mobility: Supervision  Transfer: CGA/min A FWW  Gait: ' FWW  Stairs: CGA B rail  Balance: Able to stand w/o support - slight posterior lean, braces against objects w/ transfers  Fall Class completed:  1/6/24    Outcome Measures:   Ibarra 1/1 = 20/56  Ibarra 1/8 = 24/56    10MWT - Comfortable Pace  - .35 m/s on 1/8    Assessment: Continue plan of care, pt remains motivated to work with PT.     Other Barriers to Discharge (DME, Family Training, etc):   Caregiver role for his spouse  Cognition

## 2024-01-10 NOTE — CONSULTS
"SPIRITUAL HEALTH SERVICES Consult Note  [facility] Acute Rehab    Follow-up visit with pt for spiritual support. Pt said he feels he is making good progress with his therapies. He is especially looking forward to discharge \"to be able to be with my wife, who has dementia...\" Prayer was welcomed today. Will continue to follow while pt on unit.     Jon Barrera) Jeanne Mcenil M.Div., [facility]  Staff   Pager [phone]      * [facility] remains available 24/7 for emergent requests/referrals, either by having the switchboard page the on-call  or by entering an ASAP/STAT consult in Epic (this will also page the on-call ). Routine Epic consults receive an initial response within 24 hours.*    "

## 2024-01-11 NOTE — PROGRESS NOTES
[facility]   Acute Rehabilitation Unit    INTERVAL HISTORY  Notes and labs reviewed over past 24 hours. No acute overnight events reported    Patient was seen and examined. He has no new acute concerns or complaints. He continues to increase oral hydration. He reports that his wife is refusing assisted living facility and due to his 24/7 needs, family will likely need to explore hiring live in help, social work notified and actively communicating plan with patients daughter.     ROS: 10 point ROS was assessed and was negative unless otherwise stated in HPI      Functionally,    With PT: 1/10    Current Status:  Bed Mobility: Supervision  Transfer: CGA/min A FWW  Gait: ' FWW  Stairs: CGA B rail  Balance: Able to stand w/o support - slight posterior lean, braces against objects w/ transfers  Fall Class completed:  1/6/24     Outcome Measures:   Ibarra 1/1 = 20/56  Ibarra 1/8 = 24/56    With OT: 1/11    Current Status:  ADLs:  Mobility: CGA with walker, intermittent posterior leaning  Grooming: CGA standing with walker, set-up seated  Dressing: set-up with UBD seated, CGA with LBD tasks with walker. SBA with footwear seated   Bathing: Recommend tub bench-pt declined initial shower  Toileting: CGA with toilet transfer, and min A with toilet hygiene with walker.   IADLs: Unable to correctly setup 0/4 medications with med box. Anticipate assistance with med mgmt upon discharge.   Vision/Cognition: further assessment    With SLP: 1/10    Current Status:  Hearing: WFL  Vision: Glasses  Communication: WFL  Cognition: WNL per CLQT. Moderate reasoning, mild attention and memory deficits during tasks.   Swallow: regular, thin (0) liquid. NO STRAWS      Assessment: Pt seen for cognitive communication tx. Pt is fatigued but able to participate. Intervention targeted categorical reasoning task. Given three related words, pt was able to identify their category with 95% accuracy IND.  "Increased time for processing information needed.           MEDICATIONS  Scheduled meds   amLODIPine  10 mg Oral Daily    carvedilol  12.5 mg Oral BID w/meals    enoxaparin ANTICOAGULANT  40 mg Subcutaneous Q24H    insulin aspart   Subcutaneous Daily with breakfast    insulin aspart   Subcutaneous Daily with lunch    insulin aspart   Subcutaneous Daily with supper    insulin aspart  1-7 Units Subcutaneous TID AC    insulin aspart  1-5 Units Subcutaneous BID    insulin glargine  7 Units Subcutaneous Q24H    irbesartan  300 mg Oral At Bedtime    isosorbide mononitrate  60 mg Oral QPM    ramelteon  8 mg Oral At Bedtime    tamsulosin  0.4 mg Oral QAM       PRN meds:  acetaminophen, glucose **OR** dextrose **OR** glucagon, insulin aspart, menthol (Topical Analgesic) 2.5%, - MEDICATION INSTRUCTIONS -, senna-docusate      PHYSICAL EXAM  /61 (BP Location: Left arm, Patient Position: Sitting, Cuff Size: Adult Regular)   Pulse 63   Temp 97.8  F (36.6  C) (Oral)   Resp 16   Ht 1.753 m (5' 9\")   Wt 63 kg (139 lb)   SpO2 91%   BMI 20.53 kg/m    General: in no acute distress, conversational and alert, resting comfortably in bed  HEENT: atraumatic, nares clear, conjunctiva white  Pulmonary: on room air, lungs clear to auscultation bilaterally  Cardiovascular: RRR, no murmur auscultated, well-perfused peripherally  Abdominal: soft, non-tender to palpation, non-distended  Extremities: warm peripherally, no edema in BLEs  Skin: warm, dry, without erythema, ecchymosis, or rash noted  MSK: Moves all four extremities volitionally and against gravity.   Neuro: conjugate gaze, speech non-dysarthric,and comprehensible      LABS  Results for orders placed or performed during the hospital encounter of 12/29/23 (from the past 24 hour(s))   Glucose by meter   Result Value Ref Range    GLUCOSE BY METER POCT 242 (H) 70 - 99 mg/dL   Glucose by meter   Result Value Ref Range    GLUCOSE BY METER POCT 224 (H) 70 - 99 mg/dL   Glucose by " meter   Result Value Ref Range    GLUCOSE BY METER POCT 298 (H) 70 - 99 mg/dL   Glucose by meter   Result Value Ref Range    GLUCOSE BY METER POCT 248 (H) 70 - 99 mg/dL   Glucose by meter   Result Value Ref Range    GLUCOSE BY METER POCT 148 (H) 70 - 99 mg/dL   CBC with Platelets & Differential    Narrative    The following orders were created for panel order CBC with Platelets & Differential.  Procedure                               Abnormality         Status                     ---------                               -----------         ------                     CBC with platelets and d...[[id number]]  Abnormal            Final result                 Please view results for these tests on the individual orders.   Basic metabolic panel   Result Value Ref Range    Sodium 139 135 - 145 mmol/L    Potassium 3.7 3.4 - 5.3 mmol/L    Chloride 104 98 - 107 mmol/L    Carbon Dioxide (CO2) 26 22 - 29 mmol/L    Anion Gap 9 7 - 15 mmol/L    Urea Nitrogen 19.5 8.0 - 23.0 mg/dL    Creatinine 1.42 (H) 0.67 - 1.17 mg/dL    GFR Estimate 49 (L) >60 mL/min/1.73m2    Calcium 8.5 (L) 8.8 - 10.2 mg/dL    Glucose 157 (H) 70 - 99 mg/dL   CBC with platelets and differential   Result Value Ref Range    WBC Count 6.8 4.0 - 11.0 10e3/uL    RBC Count 3.13 (L) 4.40 - 5.90 10e6/uL    Hemoglobin 8.6 (L) 13.3 - 17.7 g/dL    Hematocrit 26.9 (L) 40.0 - 53.0 %    MCV 86 78 - 100 fL    MCH 27.5 26.5 - 33.0 pg    MCHC 32.0 31.5 - 36.5 g/dL    RDW 17.2 (H) 10.0 - 15.0 %    Platelet Count 240 150 - 450 10e3/uL    % Neutrophils 65 %    % Lymphocytes 13 %    % Monocytes 9 %    % Eosinophils 11 %    % Basophils 1 %    % Immature Granulocytes 1 %    NRBCs per 100 WBC 0 <1 /100    Absolute Neutrophils 4.4 1.6 - 8.3 10e3/uL    Absolute Lymphocytes 0.9 0.8 - 5.3 10e3/uL    Absolute Monocytes 0.6 0.0 - 1.3 10e3/uL    Absolute Eosinophils 0.7 0.0 - 0.7 10e3/uL    Absolute Basophils 0.1 0.0 - 0.2 10e3/uL    Absolute Immature Granulocytes 0.1 <=0.4 10e3/uL     Absolute NRBCs 0.0 10e3/uL   Glucose by meter   Result Value Ref Range    GLUCOSE BY METER POCT 174 (H) 70 - 99 mg/dL       ASSESSMENT AND PLAN    Tc Garcia is a 83 year old right hand dominant male with a PMH of DM2 with DKA, paroxysmal atrial fibrillation formerly on apixaban, hypertension, hyperlipidemia, pelural effusion, CKD stage 3, BPH, and ACD who sustained a left cerebellar intracranial hemorrhage from a fall on 12/11/23 with a possible left medial frontal lobe infarct on imaging.  His deficits include decreased visual acuity and mild left hemiparesis. He was admitted to acute inpatient rehabilitation on 12/27/23.     Impairment group code: 02.22 Traumatic, Closed Injury; Fall with resulting intracranial hemorrhage        PT, OT and SLP 60 minutes of each on a daily basis, in addition to rehab nursing and close management of physiatrist.       Impairment of ADL's: OT for 60 min daily to work on upper and lower body self care, dressing, toileting, bathing, energy conservation techniques with use of ADs as needed.      Impairment of mobility:  PT for 60 min daily to work on gait exercises, strengthening, endurance buildup, transfers with use of walker as needed.      Impairment of cognition/language/swallow:  SLP for 60 min daily for cognitive evaluation and treatment strategies for higher level cognitive deficits and memory impairment.      Rehab RN to administer medication, patient education on medication taking, VS monitoring, bowel regimen, glucose monitoring and wound care/surgical wound dressing changes and monitoring.            Medical Conditions     #Traumatic brain injury  #Intraparenchymal hematoma    #Mild hydrocephalus   #H/o spontaneous intracranial hemorrhage (1/2023)  paroxysmal atrial fibrillation (on eliquis prior to admission)  Presented with dizziness diplopia and nausea. Workup found 1.5 X 3 cm intraparenchymal hematoma centered in inferior cerebellar vermis with likely extension into  fourth ventricle; no hydrocephalus. Etiology ruled likely hypertensive in setting of chronic anticoagulation after fall.    - follow up with stroke neurology 6-8 weeks from 12/27  --Had left hand weakness on 12/31 and stat head CT showed no new intracranial abnormalities  -Continue PT, OT, SLP  -Underwent neuropsych testing, awaiting final report.       #Chronic C7 compression fracture  No dynamic instability.        #Chronic diastolic CHF  #Atrial fibrillation PTA on Eliquis  #Hypertension  #Hyperlipidemia  Previously on apixaban, held after ICH. Anticoagulation was reversed and held. Echocardiogram with EF of 60 to 65%.  Severe biatrial enlargement.  Mild to moderate TR.  - lovenox ppx  - stroke neurology in 6-8 weeks  - BP goal systolic 130-150  - continue amlodipine  - continue carvedilol  - continue avapro  - hold PTA demadex  - follow up with cardiology in 1 month to discuss Watchman        #Diabetic ketoacidosis  #Diabetes mellitus type II HgbA1c 8.1% 12/12/23  Patient's insulin pump had been on hold since admission in the setting of IPH. Blood sugars labile during hospital stay. Patient went into DKA on 12/24/2023.  Likely triggered by infection with rise in the WBC count hence pump discontinued and patient switched to insulin drip per DKA protocol. DKA resolved on 12/25/2023, but then he had another episode of likely DKA on morning of 12/28/23 which may have been due to patient confusion about use of pump / injectable insulin  - Endo consulted and following. 1/02, restarted PTA insulin pump, but due to cognitive impairments to properly and safely administer insulin, giving excess or too little insulin, he was taken off of the insulin pump and placed on basal lantus and carb coverage novolog. As of 1/10 on:              --  continue Lantus 7 unit(s) q24 hours at 1800     -  ConitnueNovolog ICR 1:10 g cho with breakfastgrams.  NovoLog ICR 1:18 g at lunch, dinner and snacks.  Cover all intake. (If not hitting  threshold, may need to switch to echo pen and do 0.5 unit(s) per 12.5 g cho dosing).    -  Reduce Novolog correction scale 1:65 >150 TID AC; 1:65 >200 HS and 0200     -  BG TID AC/HS and 0200     #Adjustment Disorder   Patient is feeling down and hopless with what he feels is little progression functionally. Reporting he is worried about his health status and what will happen to his wife if he is unable to care for her.   -Clinical Psychology consult placed to evaluate and treat        #Insomnia, improving  -1/08 continue remelteon and melatonin        #Possible aspiration pneumonia.  Agitation, leukocytosis on 12/12 to 12/13. No growth blood cultures.  CXR R > L lower lung opacity.  Completed IV ceftriaxone - cefuroxime course.  - CXR in ~ 4 weeks (~1/27/24)  - Incentive spirometry  - aspiration precautions     #Onychomycosis  Ortho/podiatry consulted on 1/06. Podiatry rounds on 1/11 and will see him.      #Presumed sepsis likely due to UTI  Patient went into DKA with rising white count and mildly positive UA.  Of note, patient had just completed treatment for blood pneumonia UTI, question if this was not completely treated.  Urine culture with no growth. Started on vanc/zosyn and narrowed to ceftriaxone 12/27/23  - Completed 7 day total course of abx with ceftriaxone on 1/2/24        #CKD Stage 3  Baseline creatinine 1-1.1  - continue irbesartan  - consider readding torsemide prn for edema  - Creatinine 1/11 creatinine still elevated at 1.42. Will give IV fluids in afternoon after therapies. Continue to encourage oral hydration     #Anemia of chronic disease  - Continue to assess with Monday and Thursday labs.   -Hgb stable at 8.8 on 1/08     Adjustment to disability:  Clinical psychology consulted, to eval and treat  Bowel: PRN meds available  Bladder: Continent  DVT Prophylaxis: Lovenox  GI Prophylaxis: On regular diet, will assess for GI symptoms  Code: DNR/DNI  Disposition: Home vs. Alternate level of  care.  ELOS:  1/13. Pending disposition  Follow up Appointments on Discharge:   -Primary care provider, Jessika Cordoba in 1-2 weeks from discharge  Stroke neurology in 6-8 weeks from 12/27/23  cardiology in 1 month from 12/27/23  CXR in ~ 1/27/24  [facility]         Seen and discussed with Dr. Paez, PM&R staff physician     [place], DO  PGY2  Physical Medicine and Rehabilitation-[facility]  Pager: [phone]

## 2024-01-11 NOTE — PROGRESS NOTES
"BERTHA notified by OT and resident that pt's wife is now refusing to move into an [facility]ENC[facility]. BERTHA left a voicemail for pt's daughter, Camille, and sent an email offering assistance with next steps and further discussion about recommending 24/7 care. Pt's daughter reports she is working on getting pt onto MA so EW can pay for the residential. BERTHA explained that this process can take time, and an alternative discharge plan needs to be made.     BERTHA received an email back from Camille stating:     \"I can't help my parents anymore. Please remove me from being their contact. Ask my Dad who he wants to replace me as his main contact. Thanks.\"    BERTHA notified IDT and leadership. BERTHA asked Camille if anyone is caring for her mother, Radha, at this time, and Camille said no. BERTHA filed a Vulnerable Adult Report. BERTHA spoke with pt in the afternoon, who confirmed no one is staying with his wife. Pt gave SW permission to reach out to daughter, Brittani. Pt is under the impression he will have 24/7 support once he's home that will be covered by his insurance - pt does not have this benefit with his insurance.    BERTHA called Brittani and spoke with her about today's events. Brittani confirmed that Camille no longer wants to be involved and is not taking any calls. Brittani is unable to provide much assistance since she is in [place], and declined to assist with discharge planning. Camille managed all of pt's finances, Brittani has not been involved in finances in any way. Brittani shared that pt and pt's wife went through a similar situation last year where 24/7 was recommended, but they refused to go anywhere but home. She said \"we can't force them to do anything, they're always going to do what they want to do\". Brittani and Camille have had many conversations with pt and pt's wife regarding their need for 24/7. Brittani supports pt and pt's wife going to an [facility], but she is expecting pt will refuse placement and will return home. BERTHA will keep Brittani updated on discharge plans.    [facility] Report #[id number]    Denita " JERE Avery  Post Acute Float   [facility]    Phone: [phone]  Fax: [phone]

## 2024-01-11 NOTE — PROGRESS NOTES
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 SBA with footwear seated   Bathing: Recommend tub bench-pt declined initial shower  Toileting: CGA with toilet transfer, and min A with toilet hygiene with walker.   IADLs: Unable to correctly setup 0/4 medications with med box. Anticipate assistance with med mgmt upon discharge.   Vision/Cognition: further assessment     Assessment: Focused on ADL routine with walker. Pt reporting spouse is not wanting to move to [facility]. Educated pt on recommendations for hired in help for 24/7 care. Writer requested  to provide daughter additional resources for hired in help.     -15 d/t lab and MD visit     Other Barriers to Discharge (DME, Family Training, etc): Pt reports having shower chair at home.   Family training prior to discharge

## 2024-01-11 NOTE — PLAN OF CARE
Discharge Planner Post-Acute Rehab SLP:      Discharge Plan: home vs [facility] with HH SLP. 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 Moderate reasoning, mild attention and memory deficits during tasks.   Swallow: regular, thin (0) liquid. NO STRAWS      Assessment:  Pt on phone with dtr upon arrival. Dtr with more concerns for state of current living situation as pt spouse (with dementia) has been caring for their animals and not cleaning up after them in their house. Pt on phone was insistent he could provide assist with clean up. Following this conversation, SLP provided education re: physical limitations to clean up mess of this described magnitude and inability for pt to compelte this. Pt vebalized understanding. SW handoff complete. SW to provide additional resources for family. Pt aware of increased need for assist and care but has impaired reasoning within situation for solution. Further education provided defering for family to problem solve within situation. Pt participated in task, planning an event given criteria. Significant inattention, required max consistent cues to account for all information and place into correct date on calendar. Max cues required to complete task correctly. Notable difficulties reasoning within task and identifying problems assoicated with missed procedures/appts. Further education provided outlining pt difficulties and impact of deficits on life. SLP further reinforced rationale for needing assist with planning at dc     Other Barriers to Discharge (Family Training, etc): family training: IADL assist

## 2024-01-11 NOTE — PROGRESS NOTES
Diabetes Consult Daily  Progress Note          Assessment/Plan:     HPI:  Tc Garcia is a 83 year old right hand dominant male with a relevant PMH of DM1 on insulin pump (history of DKA), paroxysmal atrial fibrillation on eliquis, HTN, HLD, CKD stage 3, recurrent pleural effusion, and history of spontaneous intracranial hemorrhage who sustained a left intraparenchymal hematoma after a fall on 12/11/23, and found to have left medial frontal lobe diffusion restriction with sequelae of  left sided weakness, lower extremity more than upper extremity, as well as increased blurriness of vision.     Assessment:         Type 1 Diabetes Mellitus who presented in DKA (resolved) with diabetes c/b nephropathy, neuropathy who is managed outpatient on Reachoo amb pump and Lucho CGM. A1c 8.1% (less stringent control for elderly patients with sig medical complexities for safety)   2.   Labile BG  3.   Spontaneous intracranial hemorrhage; L sided weakness and blurred vision; improved     Plan:        -  Lantus 7 unit(s) q24 hours at 1800 hrs    -  Novolog 1 unit(s) per 10 g cho breakfast, per primary service: 1:18 g cho lunch/dinner and snacks.  Cover all intake.     -  Novolog custom 1/65 resistance TID AC/HS and 0200     -  BG TID AC/HS and 0200    -  please check anytime Tc feels low    -  Ok to wear Dexcom/Lucho - nursing must continue to do POC BG monitoring as ordered.  CGM not to be used for decision-making per hospital policy.  - hypoglycemia protocol  - education needs : Not needed.  Will go home on MDI, not on pump  - Outpatient diabetes follow up: Dr Asif at \[facility]\"" clinic of Endocrinology    Inpatient Diabetes Service Signing off 01/11/2023  Please call back with any questions or if BG become more labile or if getting closer to discharge and assistance is needed for home recommendations.    Please allow at least 24 hours for home recs if they are not provided.       Plan discussed  "with patient, bedside RN/primary team.         Interval History:     The last 24 hours progress and nursing notes reviewed.  Reflect intermittent disorientation.     BG trend:    Tc is doing well, no complaints  Reports he is discharging in next few days - notes all indicate snf. He seems not connected to what is happening and believes his wife will also be helping him. Spouse w/ dementia, not caring well for home, pets.     Says he \"contacted his insurance and will have assistance at home at least at night and is not sure how much he might have during the day\".     Per neuropsych on 1/8:  \"If possible - I believe that Tc can monitor his glucose via the CGM but he should receive oversight over the admission of insulin, whether through the pump or manually\".     Is fine with not resuming pump and doing MDI \"well, that is the way I started in diabetes so I know how to do it\".  Concerns remain re: competence with giving insulin and he is not appropriate for self-management of his amb pump.    Does report needing the following supplies: sharps, needles, pens, swabs.  Does have several back up glucometers if his lan was to fail.  Unclear is this information is accurate but would not need if he is discharging to [facility].     Notes reflect podiatry is coming 1/11  Appears     Assessment of the trend past days reflects need to intensify the cho for lunch and dinner given the excursions of BG into the mid to high 200's   All ICR adjusted this AM.       Appears both IDS and primary service adjusting insulin orders so for safety will have the primary team assume care and IDS will sign off.   Communicated to team.     Planned Procedures/surgeries: none  D5W-containing solutions/medications: none    Recent Labs   Lab 01/11/24  0207 01/10/24  2138 01/10/24  1710 01/10/24  1218 01/10/24  1135 01/10/24  0813   * 248* 298* 224* 242* 235*         Nutrition:     Orders Placed This Encounter      Combination Diet Moderate " "Consistent Carb (60 g CHO per Meal) Diet; Regular Diet; Thin Liquids (level 0)        PTA Regimen:   INSULIN PUMP -  Medtronic minimed  Sensitivity factor (midnight to midnight): 60  Bolus (units:gram): 0230-8447 = 1:9, 0125-9916 = 1:7, 6957-6753 = 1:7, 3086-5428 = 1:9, 0247-2880 = 1:13   Basal Rates and Start/End times:   Rate #1 =  0.45 units/hr from 0000 to 0200.   Rate #2 = 0.45 units/hr from 0201 to 0600.   Rate #3 = 0.625 units/hr from 0601 to 0900.   Rate #4 = 0.625 units/hr from 0901 to  1700   Rate #5 = 0.50 units/hr from 1701 to 2359.      Maintain blood glucose level within a specified target range  8489-6732 110-150  0467-2531 100-120  6894-4040 100-140  Active insulin time 4 hours           Review of Systems:   CC: denies          Medications:   Steroid planning:  none        Physical Exam:   /61 (BP Location: Left arm, Patient Position: Sitting, Cuff Size: Adult Regular)   Pulse 63   Temp 97.8  F (36.6  C) (Oral)   Resp 16   Ht 1.753 m (5' 9\")   Wt 63 kg (139 lb)   SpO2 91%   BMI 20.53 kg/m      General:   A&O, NAD, pleasant, resting comfortably. Pale. Pleasant.   HEENT:  NC/AT. MMM, EOMI, Anicteric. Hearing WNL. Wearing glasses.   Lungs:  unremarkable, no new cough, no SOB  ABD:   thin   Extremities:  Moving all extremities.  Feet nails in poor repair, thick, fungal.   Skin:  warm and dry, no obvious lesions/rash/bleeding  Neuro:  No focal neurological deficits - intermittent forgetfulness - this AM clear.   Psych:   Cooperative, appropriate mood, good eye contact, congruent affect          Data:     Lab Results   Component Value Date    A1C 8.1 12/12/2023    A1C 8.0 04/20/2023    A1C 7.2 01/17/2023    A1C 7.7 11/20/2020    A1C 7.4 07/08/2020    A1C 8.2 04/25/2020    A1C 7.7 10/31/2019    A1C 7.2 06/30/2009        CBC RESULTS:   Recent Labs   Lab Test 01/08/24  0605   WBC 9.5   RBC 3.19*   HGB 8.8*   HCT 27.8*   MCV 87   MCH 27.6   MCHC 31.7   RDW 17.3*        Recent Labs   Lab " Test 01/11/24  0207 01/10/24  2138 01/10/24  0813 01/10/24  0653 01/08/24  0814 01/08/24  0605   NA  --   --   --  139  --  141   POTASSIUM  --   --   --  4.0  --  3.8   CHLORIDE  --   --   --  105  --  103   CO2  --   --   --  29  --  28   ANIONGAP  --   --   --  5*  --  10   * 248*   < > 192*   < > 226*   BUN  --   --   --  19.9  --  18.0   CR  --   --   --  1.38*  --  1.44*   LLOYD  --   --   --  8.8  --  8.6*    < > = values in this interval not displayed.     Liver Function Studies -   Recent Labs   Lab Test 12/29/23  0533   PROTTOTAL 5.3*   ALBUMIN 3.4*   BILITOTAL 0.3   ALKPHOS 69   AST 9   ALT <5     Lab Results   Component Value Date    INR 1.35 12/11/2023    INR 1.13 01/15/2023    INR 1.50 11/21/2020    INR 1.18 07/17/2020    INR 1.49 05/10/2020    INR 1.88 04/16/2020    INR 1.28 02/26/2020    INR 1.65 12/31/2019    INR 1.44 11/07/2019    INR 1.37 10/31/2019    INR 1.40 10/30/2019       Marianne Adames, OMAR [facility], [facility]  Inpatient Diabetes Management Service  Pager - [phone]  Available on [email or web address]     To contact Endocrine Diabetes service:   From 7AM-5PM: page inpatient diabetes provider who is following the patient that day (see filed or incomplete progress notes/consult notes).  If uncertain of provider assignment: page job code [phone]. (To page job code in-house dial 3 stars, 777 then enter number).  For questions or updates AFTER HOURS from 5PM-7AM: page the diabetes job code for on call fellow: [phone]    Please notify inpatient diabetes service if changes are planned to steroids, nutrition, or if procedures are planned requiring prolonged NPO status. Diabetes Management Team job code: [phone]       I spent a total of 35 minutes on the date of the encounter doing prep/post-work, chart review, history and exam, documentation and further activities per the note including lab review, multidisciplinary communication, counseling the patient and/or coordinating care regarding acute hyper/hypoglycemic  management, as well as discharge management and planning/communication.  See note for details.

## 2024-01-11 NOTE — PLAN OF CARE
FOCUS/GOAL  Bladder management, Pain management, Mobility, Skin integrity, Cognition/Memory/Judgment/Problem solving, and Safety management    ASSESSMENT, INTERVENTIONS AND CONTINUING PLAN FOR GOAL:    Pt is alert, disoriented to time. Continent of bladder, voiding without difficulty using toilet. Continent of medium BM this shift. Up assist of 1 with walker. Denied pain or discomfort this shift. Sacral mepilex intact for protection. Pt uses call light appropriately, able to make needs known. Bed alarm on for safety.

## 2024-01-11 NOTE — PLAN OF CARE
Goal Outcome Evaluation:      Plan of Care Reviewed With: patient    Overall Patient Progress: no changeOverall Patient Progress: no change    Outcome Evaluation: Pt. A&O x4, able to make needs known. Pt. reported no CP, SOB, or n/v. Denies pain this shift. Continent of B/B, ambulate to bathroom x1 this shift. Sacral mepilex in place as preventative, redness blanchable on sacrum. encourage pt. to reposition in bed overnight.  and 148. Pt. slept between cares this shift.

## 2024-01-11 NOTE — CONSULTS
Assessment: Tc is an 82 YO with DM2 and neuropathy. He has onychomycosis.    Plan:  - Pt seen and evaluated.  - Nails debrided x 10.  - Will s/o. Please reconsult with any new concerns.    HPI: Tc is an 82 YO with T2DM and onychomycosis. He notes that the nails are painful for him when walking. Hard to even wear socks.     Past Medical History:   Diagnosis Date    Anemia     Anemia of chronic disease 5/14/2020    Back pain     CKD (chronic kidney disease) stage 3, GFR 30-59 ml/min (H)     CRF (chronic renal failure), stage 3  5/14/2020    Fall 11/2019    suffered multiple left rib fractures, C3 and T2 fractures    Mixed hyperlipidemia 7/7/2004    Paroxysmal atrial fibrillation (H)     Personal history of colonic polyps 1972    gets colonoscopy every five years, due in 2006    Pleural effusion on left 11/2019    after multiple rib fractures    Pulmonary hypertension (H) 5/10/2020    Added automatically from request for surgery [id number]    Recurrent Left Pleural effusion -- S/P Pleurex Cath 5/12/20 12/30/2019    Rosacea 1989    Type II or unspecified type diabetes mellitus without mention of complication, not stated as uncontrolled 1999    Unspecified essential hypertension 1994     Past Surgical History:   Procedure Laterality Date    ANESTHESIA CARDIOVERSION N/A 2/3/2020    Procedure: ANESTHESIA, FOR CARDIOVERSION;  Surgeon: GENERIC ANESTHESIA PROVIDER;  Location:  OR     RIGHT HEART CATH MEASUREMENTS RECORDED N/A 5/13/2020    Procedure: Right Heart Cath;  Surgeon: Senthil Silva MD;  Location:  HEART CARDIAC CATH LAB    HC REMOVE TONSILS/ADENOIDS,<13 Y/O      T & A <11y.o.    IR CHEST TUBE DRAIN TUNNELED LEFT  5/12/2020    IR CHEST TUBE PLACEMENT NON-TUNNELLED LEFT  7/11/2020    IR CHEST TUBE REMOVAL NON TUNNELED LEFT  7/17/2020    IR CHEST TUBE REMOVAL TUNNELED LEFT  7/11/2020    LAPAROSCOPIC CHOLECYSTECTOMY N/A 11/22/2020    Procedure: CHOLECYSTECTOMY, LAPAROSCOPIC;  Surgeon: Annette Lambert  "MD;  Location:  OR    LAPAROSCOPIC HERNIORRHAPHY INGUINAL BILATERAL Bilateral 10/27/2020    Procedure: LAPAROSCOPIC BILATERAL INGUINAL HERNIA REPAIR WITH MESH;  Surgeon: Brian Garsia MD;  Location:  OR        Allergies   Allergen Reactions    No Known Drug Allergy      Social History     Social History Narrative    Not on file     Objective:  Vital signs:  Temp: 97.8  F (36.6  C) Temp src: Oral BP: 121/61 Pulse: 63   Resp: 16 SpO2: 91 % O2 Device: None (Room air) Oxygen Delivery: 2 LPM Height: 175.3 cm (5' 9\") Weight: 63 kg (139 lb)  Estimated body mass index is 20.53 kg/m  as calculated from the following:    Height as of this encounter: 1.753 m (5' 9\").    Weight as of this encounter: 63 kg (139 lb).      Lab Results   Component Value Date    WBC 6.8 01/11/2024    WBC 10.4 06/15/2021     Lab Results   Component Value Date    RBC 3.13 01/11/2024    RBC 4.08 06/15/2021     Lab Results   Component Value Date    HGB 8.6 01/11/2024    HGB 11.7 06/15/2021     Lab Results   Component Value Date    HCT 26.9 01/11/2024    HCT 36.3 06/15/2021     No components found for: \"MCT\"  Lab Results   Component Value Date    MCV 86 01/11/2024    MCV 89 06/15/2021     Lab Results   Component Value Date    MCH 27.5 01/11/2024    MCH 28.7 06/15/2021     Lab Results   Component Value Date    MCHC 32.0 01/11/2024    MCHC 32.2 06/15/2021     Lab Results   Component Value Date    RDW 17.2 01/11/2024    RDW 14.2 06/15/2021     Lab Results   Component Value Date     01/11/2024     06/15/2021     Last Comprehensive Metabolic Panel:  Sodium   Date Value Ref Range Status   01/11/2024 139 135 - 145 mmol/L Final     Comment:     Reference intervals for this test were updated on 09/26/2023 to more accurately reflect our healthy population. There may be differences in the flagging of prior results with similar values performed with this method. Interpretation of those prior results can be made in the context of the " updated reference intervals.    07/12/2021 141 134 - 144 mmol/L Final     Potassium   Date Value Ref Range Status   01/11/2024 3.7 3.4 - 5.3 mmol/L Final   01/17/2023 4.2 3.4 - 5.3 mmol/L Final   07/12/2021 4.0 3.5 - 5.2 mmol/L Final     Chloride   Date Value Ref Range Status   01/11/2024 104 98 - 107 mmol/L Final   01/17/2023 105 94 - 109 mmol/L Final   07/12/2021 100 96 - 106 mmol/L Final     Chloride (External)   Date Value Ref Range Status   11/04/2021 102 96 - 106 mmol/L Final     Carbon Dioxide   Date Value Ref Range Status   07/12/2021 26 20 - 29 mmol/L Final     Carbon Dioxide (CO2)   Date Value Ref Range Status   01/11/2024 26 22 - 29 mmol/L Final   01/17/2023 25 20 - 32 mmol/L Final     Anion Gap   Date Value Ref Range Status   01/11/2024 9 7 - 15 mmol/L Final   01/17/2023 12 3 - 14 mmol/L Final   03/18/2021 4 3 - 14 mmol/L Final     Glucose   Date Value Ref Range Status   01/11/2024 157 (H) 70 - 99 mg/dL Final   01/17/2023 220 (H) 70 - 99 mg/dL Final   07/12/2021 218 (A) 70 - 99 mg/dL Final     GLUCOSE BY METER POCT   Date Value Ref Range Status   01/11/2024 146 (H) 70 - 99 mg/dL Final     Urea Nitrogen   Date Value Ref Range Status   01/11/2024 19.5 8.0 - 23.0 mg/dL Final   01/17/2023 36 (H) 7 - 30 mg/dL Final   07/12/2021 18 8 - 27 mg/dL Final     BUN/Creatinine Ratio   Date Value Ref Range Status   07/12/2021 10 10 - 24 Final     Creatinine   Date Value Ref Range Status   01/11/2024 1.42 (H) 0.67 - 1.17 mg/dL Final   07/12/2021 1.73 (A) 0.76 - 1.27 mg/dL Final     GFR Estimate   Date Value Ref Range Status   01/11/2024 49 (L) >60 mL/min/1.73m2 Final   06/21/2021 40 (L) >60 ml/min/1.73m2 Final     Calcium   Date Value Ref Range Status   01/11/2024 8.5 (L) 8.8 - 10.2 mg/dL Final   07/12/2021 9.3 8.6 - 10.2 mg/dL Final     Bilirubin Total   Date Value Ref Range Status   12/29/2023 0.3 <=1.2 mg/dL Final   03/22/2021 0.5 0.0 - 1.2 mg/dL Final     Alkaline Phosphatase   Date Value Ref Range Status    12/29/2023 69 40 - 150 U/L Final     Comment:     Reference intervals for this test were updated on 11/14/2023 to more accurately reflect our healthy population. There may be differences in the flagging of prior results with similar values performed with this method. Interpretation of those prior results can be made in the context of the updated reference intervals.   03/22/2021 62 39 - 117 U/L Final     ALT   Date Value Ref Range Status   12/29/2023 <5 0 - 70 U/L Final     Comment:     Reference intervals for this test were updated on 6/12/2023 to more accurately reflect our healthy population. There may be differences in the flagging of prior results with similar values performed with this method. Interpretation of those prior results can be made in the context of the updated reference intervals.     03/22/2021 5 0 - 44 U/L Final     AST   Date Value Ref Range Status   12/29/2023 9 0 - 45 U/L Final     Comment:     Reference intervals for this test were updated on 6/12/2023 to more accurately reflect our healthy population. There may be differences in the flagging of prior results with similar values performed with this method. Interpretation of those prior results can be made in the context of the updated reference intervals.   03/22/2021 12 0 - 40 U/L Final       PE:  DP and PT pulses not palpable. CRT 1 second. Absent pedal hair.  Gross sensation diminished.  Equinus noted BL.  Onychomycosis with thickness, discoloration, flaking and subungual debris x 10.

## 2024-01-11 NOTE — PLAN OF CARE
"Discharge Planner Post-Acute Rehab PT:     Discharge Plan: TBD - pt and spouse in need of cares/half-way    Precautions: Alarms    Current Status:  Bed Mobility: Supervision  Transfer: CGA/min A FWW  Gait: ' FWW  Stairs: CGA B rail  Balance: Able to stand w/o support - slight posterior lean, braces against objects w/ transfers  Fall Class completed:  1/6/24    Outcome Measures:   Ibarra 1/1 = 20/56  Ibarra 1/8 = 24/56    10MWT - Comfortable Pace  - .35 m/s on 1/8    Assessment: Pt taking calls and appearing stressed regarding placement today. Pt stated that he \"did not have it in him today\" and discontinued early to go back to room and wait for calls. Pt had decreased walking endurance today but overall good static balance.      Other Barriers to Discharge (DME, Family Training, etc):   Caregiver role for his spouse  Cognition limiting home safety  "

## 2024-01-12 NOTE — PLAN OF CARE
FOCUS/GOAL  Bowel management, Bladder management, Pain management, Mobility, Skin integrity, Cognition/Memory/Judgment/Problem solving, and Safety management    ASSESSMENT, INTERVENTIONS AND CONTINUING PLAN FOR GOAL:    Pt is alert, disoriented to time. Continent of bladder, voiding spontaneously using toilet. Continent of BM x 2 this shift. Denied pain or discomfort this shift. Pt had left hand PIV placed this shift for a 1 liter LR bolus. Pt is up assist of 1 with walker to the bathroom. Patient's coccyx was pink and the sacral Mepilex was changed. Pt uses call light appropriately, able to make needs known. Bed and chair alarms on for safety.

## 2024-01-12 NOTE — PROGRESS NOTES
CLINICAL NUTRITION SERVICES - REASSESSMENT NOTE     Nutrition Prescription    RECOMMENDATIONS FOR MDs/PROVIDERS TO ORDER:  None at present.    Malnutrition Status:    Moderate malnutrition in the context of chronic disease.    Recommendations already ordered by Registered Dietitian (RD):  Continue interventions as ordered:  - 2 pkts saltines + 2 slices cheese (10 g CHO) at 10 am  - Cottage cheese + peaches (17 g CHO) at 2 pm   - Glucerna BID  - Room service w/ assist    Future/Additional Recommendations:  Monitor oral intake, weight, supplement/snack preferences.     EVALUATION OF THE PROGRESS TOWARD GOALS   Diet: Moderate Consistent Carbohydrate  - 2 pkts saltines + 2 slices cheese (10 g CHO) at 10 am  - Cottage cheese + peaches (17 g CHO) at 2 pm   - Glucerna BID  - Room service w/ assist  Intake: Pt consuming % of meals per flowsheet.       NEW FINDINGS   Met with pt. He reports that his appetite is good. He's eating the same as his baseline now. He eats 3 meals a day. He likes the food snacks. He drinks the 2 Glucerna everyday.    Weight:  - Last wt (1/7): 63 kg  - Daily wts ordered 1/5  - 6.5% wt loss in 6 months (down from 67.4 kg on 7/5/23)    Labs:  1/11 Cr 1.42 (H). GFR 49 (L).  1/11 glucose 157 (H). 12/12 A1C 8.1 (H).    Medications:  Novolog  Lantus    GI: Stooling 1-2x daily per I/O    Skin: no findings    Endo: DM1, signed off 1/11    Renal: encouraging oral hydration for elevated Cr, CKD3    MALNUTRITION  % Intake: Decreased intake does not meet criteria  % Weight Loss: Weight loss does not meet criteria  Subcutaneous Fat Loss: Facial region:  mild - visualized with street clothes  Muscle Loss: Temporal:  severe - visualized with street clothes  Fluid Accumulation/Edema: Does not meet criteria - trace  Malnutrition Diagnosis: Moderate malnutrition in the context of chronic disease.    Previous Goals   Patient to consume % of nutritionally adequate meal trays TID, or the equivalent with  supplements/snacks.  Evaluation: Met    Previous Nutrition Diagnosis  Inadequate oral intake related to decreased appetite as evidenced by pt report and moderate to severe muscle and fat loss.     Evaluation: Improving    CURRENT NUTRITION DIAGNOSIS  Predicted inadequate nutrient intake (kcal/protein) related to previously poor appetite, now improved to baseline per pt report.    INTERVENTIONS  Implementation  Medical food supplement therapy  Modify composition of meals/snacks    Goals  Patient to consume % of nutritionally adequate meal trays TID, or the equivalent with supplements/snacks.    Monitoring/Evaluation  Progress toward goals will be monitored and evaluated per protocol.    Tutu Crabtree, RD, LD  AR[facility] RD pager: [phone]  Weekend/Holiday RD pager: [phone]

## 2024-01-12 NOTE — PROGRESS NOTES
"BERTHA spoke with Yael with [facility] and provided additional information on the [facility] report for pt's wife, Radha. Yael will contact [facility] for a wellness check.    BERTHA met with pt mid-day. Pt reports he's been getting phone calls from his wife and daughter who are upset. Pt kept talking about his wife being mean and threatening to harm herself. Pt is open to going to an senior care, but pt's wife refuses. Pt was concerned and wanted to see what could be done. Pt said his daughter got a call from [facility] asking questions and was overwhelmed. SW offered to request a wellness check on pt's wife, pt said \"no, that would upset her more\". BERTHA asked pt what he would like SW to do - he requested SW reach out to Sanna with [facility].    BERTHA spoke with Yael with [facility] for an update - a wellness visit was completed this morning, and pt was able to talk with the officers and nothing out of the normal was noted. Pt had food to eat and had neighbors checking in on her. AP case remains open.    BERTHA called Sanna with [facility] and discussed pt's case - Sanna was also told by Camille that she no longer wanted to be involved. Camille is not POA, but was managing all finances. Sanna was helping Camille liquify her parents assets to pay for an [facility], at this time she was expecting pt/pt's wife would have 5-6 months of senior care expenses to spend down. Sanna will send BERTHA a list of the ALFs that she had been making referrals to for pt. Sanna shared that she has tried helping Camille set up home support so pt and pt's wife remain at home, but pt's wife will not allow any caretakers through her front door.    BERTHA spoke with supervisor about case. BERTHA will follow up with Sanna on Monday to begin sending [facility] referrals.     BERTHA notified by admissions that pt has received authorization through Tuesday 1/16.     [facility] Yael SANDERS (PH: [phone])    JERE Escobedo  Post " Acute Float   [facility]    Phone: [phone]  Fax: [phone]

## 2024-01-12 NOTE — PLAN OF CARE
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 SBA with footwear seated   Bathing: Recommend tub bench-pt declined initial shower  Toileting: CGA with toilet transfer, and min A with toilet hygiene with walker.   IADLs: Unable to correctly setup 0/4 medications with med box. Anticipate assistance with med mgmt upon discharge.   Vision/Cognition: further assessment     Assessment: Focused on morning ADL routine with walker, CGA and extended time needed with 1 significant LOB corrected with Mod A. Pt continues to have decreased insight into assistance needs at home despite education.     Other Barriers to Discharge (DME, Family Training, etc): Pt reports having shower chair at home.   Family training prior to discharge

## 2024-01-12 NOTE — PLAN OF CARE
Discharge Planner Post-Acute Rehab PT:     Discharge Plan: TBD - pt and spouse in need of cares/[facility]    Precautions: Alarms    Current Status:  Bed Mobility: Supervision  Transfer: CGA/min A FWW  Gait: ' FWW  Stairs: CGA B rail  Balance: Able to stand w/o support - slight posterior lean, braces against objects w/ transfers  Fall Class completed:  1/6/24    Outcome Measures:   Ibarra 1/1 = 20/56  Ibarra 1/8 = 24/56    10MWT - Comfortable Pace  - .35 m/s on 1/8    Assessment: Declined pm PT appointment, missed 50 minutes  Other Barriers to Discharge (DME, Family Training, etc):   Caregiver role for his spouse  Cognition limiting home safety

## 2024-01-12 NOTE — PROGRESS NOTES
[facility]   Acute Rehabilitation Unit    INTERVAL HISTORY  Notes and labs reviewed over past 24 hours. No acute overnight events reported    Patient was seen and examined. He reports that he has had chronic back pain that has been hurting him the past couple of days. We discussed treatment options and will proceed with scheduled tylenol and lidocaine patches. He has no other acute concerns or complaints.     ROS: 10 point ROS was assessed and was negative unless otherwise stated in HPI      Functionally,    With PT: 1/11    Current Status:  Bed Mobility: Supervision  Transfer: CGA/min A FWW  Gait: ' FWW  Stairs: CGA B rail  Balance: Able to stand w/o support - slight posterior lean, braces against objects w/ transfers  Fall Class completed:  1/6/24     Outcome Measures:   Ibarra 1/1 = 20/56  Ibarra 1/8 = 24/56    With OT: 1/12    Current Status:  ADLs:  Mobility: CGA with walker, intermittent posterior leaning  Grooming: CGA standing with walker, set-up seated  Dressing: set-up with UBD seated, CGA with LBD tasks with walker. SBA with footwear seated   Bathing: Recommend tub bench-pt declined initial shower  Toileting: CGA with toilet transfer, and min A with toilet hygiene with walker.   IADLs: Unable to correctly setup 0/4 medications with med box. Anticipate assistance with med mgmt upon discharge.   Vision/Cognition: further assessment     Assessment: Focused on morning ADL routine with walker, CGA and extended time needed with 1 significant LOB corrected with Mod A. Pt continues to have decreased insight into assistance needs at home despite education.    With SLP: 1/12    Current Status:  Hearing: WFL  Vision: Glasses  Communication: WFL  Cognition: WNL per CLQT. Moderate reasoning, mild attention and memory deficits during tasks.   Swallow: regular, thin (0) liquid. NO STRAWS      Assessment:  Pt participated in task, answering ?s given a weekly schedule. Pt able to  "complete with 100% accuracy min cues and extra time. Pt then able to work around information and plan upcoming 'events' with overall min-mod cues. Pt then engaged in deductive reasoning task using calendar and prompts. Noting impaired organization and difficulties alternating attention but good reasoning within task. Pt required overall mod-max cues to complete correctly.           MEDICATIONS  Scheduled meds   amLODIPine  10 mg Oral Daily    carvedilol  12.5 mg Oral BID w/meals    enoxaparin ANTICOAGULANT  40 mg Subcutaneous Q24H    insulin aspart   Subcutaneous Daily with breakfast    insulin aspart   Subcutaneous Daily with lunch    insulin aspart   Subcutaneous Daily with supper    insulin aspart  1-7 Units Subcutaneous TID AC    insulin aspart  1-5 Units Subcutaneous BID    insulin glargine  7 Units Subcutaneous Q24H    irbesartan  300 mg Oral At Bedtime    isosorbide mononitrate  60 mg Oral QPM    lidocaine  1 patch Transdermal Q24h    ramelteon  8 mg Oral At Bedtime    tamsulosin  0.4 mg Oral QAM       PRN meds:  acetaminophen, glucose **OR** dextrose **OR** glucagon, insulin aspart, menthol (Topical Analgesic) 2.5%, - MEDICATION INSTRUCTIONS -, senna-docusate      PHYSICAL EXAM  /64 (BP Location: Right arm)   Pulse 66   Temp 97.7  F (36.5  C) (Oral)   Resp 16   Ht 1.753 m (5' 9\")   Wt 63 kg (139 lb)   SpO2 97%   BMI 20.53 kg/m    General: in no acute distress, conversational and alert, resting comfortably in bed  HEENT: atraumatic, nares clear, conjunctiva white  Pulmonary: on room air, lungs clear to auscultation bilaterally  Cardiovascular: RRR, no murmur auscultated, well-perfused peripherally  Abdominal: soft, non-tender to palpation, non-distended  Extremities: warm peripherally, no edema in BLEs  Skin: warm, dry, without erythema, ecchymosis, or rash noted  MSK: Moves all four extremities volitionally and against gravity. Thoracic paraspinal hypertonicity and tenderness along " T7-T11  Neuro: conjugate gaze, speech non-dysarthric,and comprehensible      LABS  Results for orders placed or performed during the hospital encounter of 12/29/23 (from the past 24 hour(s))   Glucose by meter   Result Value Ref Range    GLUCOSE BY METER POCT 205 (H) 70 - 99 mg/dL   Glucose by meter   Result Value Ref Range    GLUCOSE BY METER POCT 161 (H) 70 - 99 mg/dL   Glucose by meter   Result Value Ref Range    GLUCOSE BY METER POCT 85 70 - 99 mg/dL   Glucose by meter   Result Value Ref Range    GLUCOSE BY METER POCT 160 (H) 70 - 99 mg/dL   Glucose by meter   Result Value Ref Range    GLUCOSE BY METER POCT 190 (H) 70 - 99 mg/dL       ASSESSMENT AND PLAN    Tc Garcia is a 83 year old right hand dominant male with a PMH of DM2 with DKA, paroxysmal atrial fibrillation formerly on apixaban, hypertension, hyperlipidemia, pelural effusion, CKD stage 3, BPH, and ACD who sustained a left cerebellar intracranial hemorrhage from a fall on 12/11/23 with a possible left medial frontal lobe infarct on imaging.  His deficits include decreased visual acuity and mild left hemiparesis. He was admitted to acute inpatient rehabilitation on 12/27/23.     Impairment group code: 02.22 Traumatic, Closed Injury; Fall with resulting intracranial hemorrhage        PT, OT and SLP 60 minutes of each on a daily basis, in addition to rehab nursing and close management of physiatrist.       Impairment of ADL's: OT for 60 min daily to work on upper and lower body self care, dressing, toileting, bathing, energy conservation techniques with use of ADs as needed.      Impairment of mobility:  PT for 60 min daily to work on gait exercises, strengthening, endurance buildup, transfers with use of walker as needed.      Impairment of cognition/language/swallow:  SLP for 60 min daily for cognitive evaluation and treatment strategies for higher level cognitive deficits and memory impairment.      Rehab RN to administer medication, patient education  on medication taking, VS monitoring, bowel regimen, glucose monitoring and wound care/surgical wound dressing changes and monitoring.            Medical Conditions     #Traumatic brain injury  #Intraparenchymal hematoma    #Mild hydrocephalus   #H/o spontaneous intracranial hemorrhage (1/2023)  paroxysmal atrial fibrillation (on eliquis prior to admission)  Presented with dizziness diplopia and nausea. Workup found 1.5 X 3 cm intraparenchymal hematoma centered in inferior cerebellar vermis with likely extension into fourth ventricle; no hydrocephalus. Etiology ruled likely hypertensive in setting of chronic anticoagulation after fall.    - follow up with stroke neurology 6-8 weeks from 12/27  --Had left hand weakness on 12/31 and stat head CT showed no new intracranial abnormalities  -Continue PT, OT, SLP  -Underwent neuropsych testing, awaiting final report.       #Chronic C7 compression fracture  #Mid thoracic Back pain  No dynamic instability of C7 fracture  -01/12, due to persistent back pain, scheduled tylenol 650mg TID with additional PRN available. Also scheduled lidocaine patches.         #Chronic diastolic CHF  #Atrial fibrillation PTA on Eliquis  #Hypertension  #Hyperlipidemia  Previously on apixaban, held after ICH. Anticoagulation was reversed and held. Echocardiogram with EF of 60 to 65%.  Severe biatrial enlargement.  Mild to moderate TR.  - lovenox ppx  - stroke neurology in 6-8 weeks  - BP goal systolic 130-150  - continue amlodipine  - continue carvedilol  - continue avapro  - hold PTA demadex  - follow up with cardiology in 1 month to discuss Watchman        #Diabetic ketoacidosis  #Diabetes mellitus type II HgbA1c 8.1% 12/12/23  Patient's insulin pump had been on hold since admission in the setting of IPH. Blood sugars labile during hospital stay. Patient went into DKA on 12/24/2023.  Likely triggered by infection with rise in the WBC count hence pump discontinued and patient switched to insulin  drip per DKA protocol. DKA resolved on 12/25/2023, but then he had another episode of likely DKA on morning of 12/28/23 which may have been due to patient confusion about use of pump / injectable insulin  - Endo consulted and following. 1/02, restarted PTA insulin pump, but due to cognitive impairments to properly and safely administer insulin, giving excess or too little insulin, he was taken off of the insulin pump and placed on basal lantus and carb coverage novolog. Currently on:  -  Lantus 7 unit(s) q24 hours at 1800 hrs  -  Novolog 1 unit(s) per 10 g cho breakfast, per primary service: 1:18 g cho lunch/dinner and snacks.  Cover all intake.      -  Novolog custom 1/65 resistance TID AC/HS and 0200       -  BG TID AC/HS and 0200     #Adjustment Disorder   Patient is feeling down and hopless with what he feels is little progression functionally. Reporting he is worried about his health status and what will happen to his wife if he is unable to care for her.   -Clinical Psychology consult placed to evaluate and treat        #Insomnia, improving  -1/08 continue remelteon and melatonin        #Possible aspiration pneumonia.  Agitation, leukocytosis on 12/12 to 12/13. No growth blood cultures.  CXR R > L lower lung opacity.  Completed IV ceftriaxone - cefuroxime course.  - CXR in ~ 4 weeks (~1/27/24)  - Incentive spirometry  - aspiration precautions     #Onychomycosis  -Podiatry saw and debrided all 10 toenails. Will reconsult if any new concerns arise.      #Presumed sepsis likely due to UTI  Patient went into DKA with rising white count and mildly positive UA.  Of note, patient had just completed treatment for blood pneumonia UTI, question if this was not completely treated.  Urine culture with no growth. Started on vanc/zosyn and narrowed to ceftriaxone 12/27/23  - Completed 7 day total course of abx with ceftriaxone on 1/2/24        #CKD Stage 3  Baseline creatinine 1-1.1  - continue irbesartan  - consider  readding torsemide prn for edema  - Creatinine 1/11 creatinine still elevated at 1.42. Gave 1L IVF. Repeat BMP on 1/13, if creatinine remains above baseline, will give additional IVF     #Anemia of chronic disease  - Continue to assess with Monday and Thursday labs.   -Hgb stable at 8.8 on 1/08     Adjustment to disability:  Clinical psychology consulted, to eval and treat  Bowel: PRN meds available  Bladder: Continent  DVT Prophylaxis: Lovenox  GI Prophylaxis: On regular diet, will assess for GI symptoms  Code: DNR/DNI  Disposition: Home vs. Alternate level of care.  ELOS:  1/13. Pending disposition  Follow up Appointments on Discharge:   -Primary care provider, Jessika Cordoba in 1-2 weeks from discharge  Stroke neurology in 6-8 weeks from 12/27/23  cardiology in 1 month from 12/27/23  CXR in ~ 1/27/24  Diabetes follow up: Dr Asif at [facility] Endocrinology         Seen and discussed with Dr. Paez, PM&R staff physician     [place],   PGY2  Physical Medicine and Rehabilitation-[facility]  Pager: [phone]

## 2024-01-12 NOTE — PROGRESS NOTES
Patient AxOx4, on RA. VSS. Working with therapy this shift, tolerated well, see notes for details. Cont x2.Ax1 with walker and gait belt. Complaints of constant back pain, scheduled Tylenol ordered. No skin issues noted. Bed in low locked position, bed alarm on and call bell in reach. At this time, pt denies any needs or concerns.

## 2024-01-12 NOTE — PLAN OF CARE
Discharge Planner Post-Acute Rehab SLP:      Discharge Plan: home vs [facility] with  SLP. 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 Moderate reasoning, mild attention and memory deficits during tasks.   Swallow: regular, thin (0) liquid. NO STRAWS      Assessment:  Pt participated in task, answering ?s given a weekly schedule. Pt able to complete with 100% accuracy min cues and extra time. Pt then able to work around information and plan upcoming 'events' with overall min-mod cues. Pt then engaged in deductive reasoning task using calendar and prompts. Noting impaired organization and difficulties alternating attention but good reasoning within task. Pt required overall mod-max cues to complete correctly.     Other Barriers to Discharge (Family Training, etc): family training: IADL assist

## 2024-01-13 NOTE — PLAN OF CARE
Goal Outcome Evaluation:         Pt alert and oriented X4, able to make needs known. Uses call light appropriately. No c/o chest pain, SOB, N/V. Lidocaine patch applied to lower back. Sliding scale insulin given per parameters and carb counting. Takes pills whole with thin liquids. Pleasant and cooperative with cares. Call light at reach. Will continue with POC.          Patient's most recent vital signs are:     Vital signs:  BP: 128/66  Temp: 97.6  HR: 68  RR: 20  SpO2: 98 %     Patient does not have new respiratory symptoms.  Patient does not have new sore throat.  Patient does not have a fever greater than 99.5.

## 2024-01-13 NOTE — PLAN OF CARE
Discharge Planner Post-Acute Rehab PT:     Discharge Plan: TBD - pt and spouse in need of cares/jail    Precautions: Alarms    Current Status:  Bed Mobility: Supervision  Transfer: CGA/min A FWW  Gait: -200' FWW  Stairs: CGA B rail  Balance: Able to stand w/o support - slight posterior lean, braces against objects w/ transfers  Fall Class completed:  1/6/24    Outcome Measures:   Ibarra 1/1 = 20/56  Ibarra 1/8 = 24/56    10MWT - Comfortable Pace  - .35 m/s on 1/8    Assessment: Progressing with postural control training and midline orientation with decreased UE support including multi-directional massed stepping practice and dynamic weight shifts in standing.    Other Barriers to Discharge (DME, Family Training, etc):   Caregiver role for his spouse  Cognition limiting home safety

## 2024-01-13 NOTE — PLAN OF CARE
Goal Outcome Evaluation:    Patient alert and oriented but can be forgetful. Was asleep at the start of shift but called at around 0100 AM asking for sleeping medication, reminded that it was already given at bedtime. Patient asked to use the urinal for voiding but eventually requested to use the bathroom though already had one incontinent episode. Had a BM today. Received wearing O2 2L via NC. No respiratory distress. No pain or discomfort reported overnight. Comfortable this time. BG was 192. Fall precautions maintained, call light in reach, safety checks completed.      Continue with POC

## 2024-01-13 NOTE — PROGRESS NOTES
"  [facility]   Acute Rehabilitation Unit  Weekend Progress Note    INTERVAL HISTORY  Tc Garcia was seen and examined at bedside.  No acute events reported overnight. Patient is doing well overall. Therapies going well. LBM 1/12. Slept very well. Agreeable to fluids. Encouraged PO intake as well. Denies any pain.     Functionally, Patient continues to be a full participant with therapies.      ROS: 10 point ROS negative other than the symptoms noted above in the HPI.    MEDICATIONS   acetaminophen  650 mg Oral TID    amLODIPine  10 mg Oral Daily    carvedilol  12.5 mg Oral BID w/meals    enoxaparin ANTICOAGULANT  40 mg Subcutaneous Q24H    insulin aspart   Subcutaneous Daily with breakfast    insulin aspart   Subcutaneous Daily with lunch    insulin aspart   Subcutaneous Daily with supper    insulin aspart  1-7 Units Subcutaneous TID AC    insulin aspart  1-5 Units Subcutaneous BID    insulin glargine  7 Units Subcutaneous Q24H    irbesartan  300 mg Oral At Bedtime    isosorbide mononitrate  60 mg Oral QPM    lidocaine  1 patch Transdermal Q24h    ramelteon  8 mg Oral At Bedtime    tamsulosin  0.4 mg Oral QAM        acetaminophen, glucose **OR** dextrose **OR** glucagon, insulin aspart, menthol (Topical Analgesic) 2.5%, - MEDICATION INSTRUCTIONS -, senna-docusate     PHYSICAL EXAM  /63 (BP Location: Left arm)   Pulse 57   Temp 97.7  F (36.5  C) (Oral)   Resp 20   Ht 1.753 m (5' 9\")   Wt 63 kg (139 lb)   SpO2 96%   BMI 20.53 kg/m      General: No acute distress. Lying upright in bed.  Cardiovascular: Regular rate and rhythm  Pulmonary: Breathing comfortably on room air, clear to auscultation bilaterally  Abdominal: Non-tender, non-distended, bowel sounds present in all four quadrants   Extremities: Warm, well perfused, no edema in bilateral lower extremities, no tenderness in calves   Neuro/MSK:  Alert. No gross changes to neuro exam compared to prior. "     LABS  Recent Results (from the past 24 hour(s))   Glucose by meter    Collection Time: 01/12/24 11:47 AM   Result Value Ref Range    GLUCOSE BY METER POCT 190 (H) 70 - 99 mg/dL   Glucose by meter    Collection Time: 01/12/24  4:57 PM   Result Value Ref Range    GLUCOSE BY METER POCT 207 (H) 70 - 99 mg/dL   Glucose by meter    Collection Time: 01/12/24  9:37 PM   Result Value Ref Range    GLUCOSE BY METER POCT 191 (H) 70 - 99 mg/dL   Glucose by meter    Collection Time: 01/13/24  2:21 AM   Result Value Ref Range    GLUCOSE BY METER POCT 192 (H) 70 - 99 mg/dL   Glucose by meter    Collection Time: 01/13/24  7:38 AM   Result Value Ref Range    GLUCOSE BY METER POCT 188 (H) 70 - 99 mg/dL         ASSESSMENT AND PLAN  Tc Garcia is a 83 year old right hand dominant male with a PMH of DM2 with DKA, paroxysmal atrial fibrillation formerly on apixaban, hypertension, hyperlipidemia, pelural effusion, CKD stage 3, BPH, and ACD who sustained a left cerebellar intracranial hemorrhage from a fall on 12/11/23 with a possible left medial frontal lobe infarct on imaging.  His deficits include decreased visual acuity and mild left hemiparesis. He was admitted to acute inpatient rehabilitation on 12/27/23.         Rehabilitation - Continue comprehensive acute inpatient rehabilitation program with multidisciplinary approach including therapies, rehab nursing, and physiatry following. See interval history for updates.      - Vitals stable. Labs reviewed.   - Continue ongoing medical management.  - Continue therapies and plan of care.  - Refer to last progress note from primary team for full problems list.  - 1 L NS IVF ordered. Encouraged PO fluids. Recheck BMP tomorrow 1/14     Patient seen and discussed with Dr. Blackwood,  PM&R Staff Physician    Ashok Owen, DO  PGY-3 Physical Medicine & Rehabilitation

## 2024-01-13 NOTE — PROGRESS NOTES
Patient AxOx4, on RA. VSS. Working with therapy this shift, tolerated well, see notes for details. Cont x2. A x 1 with walker and gait belt. Per pt, bottom becoming sore, blanchable redness noted. Barrier cream applied, sacral heart changed. Encouraged pt to turn q 2 hours when in bed to get off of sacrum. Pt verbalized understanding to prevent further breakdown. White discharge/yeast around tip of penis noted. Per pt, not itchy and not painful. Added to sticky note in summary, waiting for MD rec. Receiving 1000 NS bolus infused over 4 hours. Bed in low locked position, bed alarm on and call bell in reach. At this time, pt denies any needs or concerns.     Nephrology progress note    Patient is a 62y Female with    ON events/Subjective:     Allergies:  No Known Allergies    Hospital Medications:   MEDICATIONS  (STANDING):  clopidogrel Tablet 75 milliGRAM(s) Oral daily  dextrose 5%. 1000 milliLiter(s) (100 mL/Hr) IV Continuous <Continuous>  dextrose 5%. 1000 milliLiter(s) (50 mL/Hr) IV Continuous <Continuous>  dextrose 50% Injectable 25 Gram(s) IV Push once  dextrose 50% Injectable 25 Gram(s) IV Push once  dextrose 50% Injectable 12.5 Gram(s) IV Push once  glucagon  Injectable 1 milliGRAM(s) IntraMuscular once  heparin   Injectable 7500 Unit(s) SubCutaneous every 8 hours  insulin lispro (ADMELOG) corrective regimen sliding scale   SubCutaneous at bedtime  insulin lispro (ADMELOG) corrective regimen sliding scale   SubCutaneous three times a day before meals  lactated ringers. 1000 milliLiter(s) (150 mL/Hr) IV Continuous <Continuous>  levothyroxine 25 MICROGram(s) Oral daily  lidocaine   4% Patch 1 Patch Transdermal daily  pantoprazole  Injectable 40 milliGRAM(s) IV Push daily  thiamine IVPB 500 milliGRAM(s) IV Intermittent daily    VITALS:  T(F): 98.3 (07-21-23 @ 09:01), Max: 98.7 (07-20-23 @ 21:20)  HR: 65 (07-21-23 @ 09:01)  BP: 124/70 (07-21-23 @ 09:01)  RR: 18 (07-21-23 @ 09:01)  SpO2: 97% (07-21-23 @ 09:01)  Wt(kg): --    07-19 @ 07:01  -  07-20 @ 07:00  --------------------------------------------------------  IN: 1800 mL / OUT: 2300 mL / NET: -500 mL    07-20 @ 07:01  -  07-21 @ 07:00  --------------------------------------------------------  IN: 3920 mL / OUT: 2200 mL / NET: 1720 mL        PHYSICAL EXAM:  Constitutional: NAD  HEENT: anicteric sclera, oropharynx clear, MMM  Neck: No JVD  Respiratory: CTAB, no wheezes, rales or rhonchi  Cardiovascular: S1, S2, RRR  Gastrointestinal: BS+, soft, NT/ND  Extremities: No cyanosis or clubbing. No peripheral edema  Neurological: A/O x 3, no focal deficits  Psychiatric: Normal mood, normal affect  : No CVA tenderness. No de jesus.   Skin: No rashes  Vascular Access:    LABS:  07-21    143  |  107  |  16  ----------------------------<  159<H>  4.1   |  25  |  1.04    Ca    8.8      21 Jul 2023 05:30  Phos  2.9     07-21  Mg     1.8     07-21                            11.8   6.05  )-----------( 232      ( 21 Jul 2023 05:30 )             35.2       Urine Studies:  Creatinine Trend: 1.04<--, 1.62<--, 1.92<--, 2.03<--  Urinalysis Basic - ( 21 Jul 2023 05:30 )    Color:  / Appearance:  / SG:  / pH:   Gluc: 159 mg/dL / Ketone:   / Bili:  / Urobili:    Blood:  / Protein:  / Nitrite:    Leuk Esterase:  / RBC:  / WBC    Sq Epi:  / Non Sq Epi:  / Bacteria:       Sodium, Random Urine: 44 mmol/L (07-19 @ 18:06)  Creatinine, Random Urine: 30 mg/dL (07-19 @ 18:06)  Creatinine, Random Urine: 30 mg/dL (07-19 @ 18:06)  Protein/Creatinine Ratio Calculation: 0.2 Ratio (07-19 @ 18:06)  Osmolality, Random Urine: 189 mosm/kg (07-19 @ 18:06)  Potassium, Random Urine: 9 mmol/L (07-19 @ 18:06)    RADIOLOGY & ADDITIONAL STUDIES:     Nephrology progress note    ON events/Subjective:     Allergies:  No Known Allergies    Hospital Medications:   MEDICATIONS  (STANDING):  clopidogrel Tablet 75 milliGRAM(s) Oral daily  dextrose 5%. 1000 milliLiter(s) (100 mL/Hr) IV Continuous <Continuous>  dextrose 5%. 1000 milliLiter(s) (50 mL/Hr) IV Continuous <Continuous>  dextrose 50% Injectable 25 Gram(s) IV Push once  dextrose 50% Injectable 25 Gram(s) IV Push once  dextrose 50% Injectable 12.5 Gram(s) IV Push once  glucagon  Injectable 1 milliGRAM(s) IntraMuscular once  heparin   Injectable 7500 Unit(s) SubCutaneous every 8 hours  insulin lispro (ADMELOG) corrective regimen sliding scale   SubCutaneous at bedtime  insulin lispro (ADMELOG) corrective regimen sliding scale   SubCutaneous three times a day before meals  lactated ringers. 1000 milliLiter(s) (150 mL/Hr) IV Continuous <Continuous>  levothyroxine 25 MICROGram(s) Oral daily  lidocaine   4% Patch 1 Patch Transdermal daily  pantoprazole  Injectable 40 milliGRAM(s) IV Push daily  thiamine IVPB 500 milliGRAM(s) IV Intermittent daily    VITALS:  T(F): 98.3 (07-21-23 @ 09:01), Max: 98.7 (07-20-23 @ 21:20)  HR: 65 (07-21-23 @ 09:01)  BP: 124/70 (07-21-23 @ 09:01)  RR: 18 (07-21-23 @ 09:01)  SpO2: 97% (07-21-23 @ 09:01)  Wt(kg): --    07-19 @ 07:01  -  07-20 @ 07:00  --------------------------------------------------------  IN: 1800 mL / OUT: 2300 mL / NET: -500 mL    07-20 @ 07:01  -  07-21 @ 07:00  --------------------------------------------------------  IN: 3920 mL / OUT: 2200 mL / NET: 1720 mL        PHYSICAL EXAM:  Constitutional: NAD  HEENT: anicteric sclera, oropharynx clear, MMM  Respiratory: CTAB, no wheezes, rales or rhonchi  Cardiovascular: S1, S2, RRR  Gastrointestinal: BS+, soft, NT/ND  Extremities: No peripheral edema  Neurological: A/O x 3, no focal deficits  Psychiatric: Normal mood, normal affect  :  No de jesus.     LABS:  07-21    143  |  107  |  16  ----------------------------<  159<H>  4.1   |  25  |  1.04    Ca    8.8      21 Jul 2023 05:30  Phos  2.9     07-21  Mg     1.8     07-21                            11.8   6.05  )-----------( 232      ( 21 Jul 2023 05:30 )             35.2       Urine Studies:  Creatinine Trend: 1.04<--, 1.62<--, 1.92<--, 2.03<--  Urinalysis Basic - ( 21 Jul 2023 05:30 )    Color:  / Appearance:  / SG:  / pH:   Gluc: 159 mg/dL / Ketone:   / Bili:  / Urobili:    Blood:  / Protein:  / Nitrite:    Leuk Esterase:  / RBC:  / WBC    Sq Epi:  / Non Sq Epi:  / Bacteria:       Sodium, Random Urine: 44 mmol/L (07-19 @ 18:06)  Creatinine, Random Urine: 30 mg/dL (07-19 @ 18:06)  Creatinine, Random Urine: 30 mg/dL (07-19 @ 18:06)  Protein/Creatinine Ratio Calculation: 0.2 Ratio (07-19 @ 18:06)  Osmolality, Random Urine: 189 mosm/kg (07-19 @ 18:06)  Potassium, Random Urine: 9 mmol/L (07-19 @ 18:06)    RADIOLOGY & ADDITIONAL STUDIES:     Nephrology progress note    ON events/Subjective: No acute overnight events. Patient seen and examined at bedside.  Patient has no complaints, ROS negative.     Allergies:  No Known Allergies    Hospital Medications:   MEDICATIONS  (STANDING):  clopidogrel Tablet 75 milliGRAM(s) Oral daily  dextrose 5%. 1000 milliLiter(s) (100 mL/Hr) IV Continuous <Continuous>  dextrose 5%. 1000 milliLiter(s) (50 mL/Hr) IV Continuous <Continuous>  dextrose 50% Injectable 25 Gram(s) IV Push once  dextrose 50% Injectable 25 Gram(s) IV Push once  dextrose 50% Injectable 12.5 Gram(s) IV Push once  glucagon  Injectable 1 milliGRAM(s) IntraMuscular once  heparin   Injectable 7500 Unit(s) SubCutaneous every 8 hours  insulin lispro (ADMELOG) corrective regimen sliding scale   SubCutaneous at bedtime  insulin lispro (ADMELOG) corrective regimen sliding scale   SubCutaneous three times a day before meals  lactated ringers. 1000 milliLiter(s) (150 mL/Hr) IV Continuous <Continuous>  levothyroxine 25 MICROGram(s) Oral daily  lidocaine   4% Patch 1 Patch Transdermal daily  pantoprazole  Injectable 40 milliGRAM(s) IV Push daily  thiamine IVPB 500 milliGRAM(s) IV Intermittent daily    VITALS:  T(F): 98.3 (07-21-23 @ 09:01), Max: 98.7 (07-20-23 @ 21:20)  HR: 65 (07-21-23 @ 09:01)  BP: 124/70 (07-21-23 @ 09:01)  RR: 18 (07-21-23 @ 09:01)  SpO2: 97% (07-21-23 @ 09:01)  Wt(kg): --    07-19 @ 07:01  -  07-20 @ 07:00  --------------------------------------------------------  IN: 1800 mL / OUT: 2300 mL / NET: -500 mL    07-20 @ 07:01  -  07-21 @ 07:00  --------------------------------------------------------  IN: 3920 mL / OUT: 2200 mL / NET: 1720 mL        PHYSICAL EXAM:  Constitutional: NAD  HEENT: anicteric sclera, oropharynx clear, MMM  Respiratory: CTAB, no wheezes, rales or rhonchi  Cardiovascular: S1, S2, RRR  Gastrointestinal: BS+, soft, NT/ND  Extremities: No peripheral edema  Neurological: A/O x 3, no focal deficits  Psychiatric: Normal mood, normal affect  :  No de jesus.     LABS:  07-21    143  |  107  |  16  ----------------------------<  159<H>  4.1   |  25  |  1.04    Ca    8.8      21 Jul 2023 05:30  Phos  2.9     07-21  Mg     1.8     07-21                            11.8   6.05  )-----------( 232      ( 21 Jul 2023 05:30 )             35.2       Urine Studies:  Creatinine Trend: 1.04<--, 1.62<--, 1.92<--, 2.03<--  Urinalysis Basic - ( 21 Jul 2023 05:30 )    Color:  / Appearance:  / SG:  / pH:   Gluc: 159 mg/dL / Ketone:   / Bili:  / Urobili:    Blood:  / Protein:  / Nitrite:    Leuk Esterase:  / RBC:  / WBC    Sq Epi:  / Non Sq Epi:  / Bacteria:       Sodium, Random Urine: 44 mmol/L (07-19 @ 18:06)  Creatinine, Random Urine: 30 mg/dL (07-19 @ 18:06)  Creatinine, Random Urine: 30 mg/dL (07-19 @ 18:06)  Protein/Creatinine Ratio Calculation: 0.2 Ratio (07-19 @ 18:06)  Osmolality, Random Urine: 189 mosm/kg (07-19 @ 18:06)  Potassium, Random Urine: 9 mmol/L (07-19 @ 18:06)    RADIOLOGY & ADDITIONAL STUDIES:

## 2024-01-13 NOTE — PLAN OF CARE
"Discharge Planner Post-Acute Rehab SLP:      Discharge Plan: home vs assisted with HH SLP. 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 Moderate reasoning, mild attention and memory deficits during tasks.   Swallow: regular, thin (0) liquid. NO STRAWS      Assessment:  Follow up with pt on task from previous day. Pt had errors in calculations in 20% of problems. Awareness only with cues. Once errors were presented, pt was able to provide correct responses with min cues. SLP initiated checkbook management task. Pt requried moderate cues for attention to task parameters and organization of information. Pt will be performing task, at least in part, before PM session.     PM:  Pt performed part of checkbook task from previous session. Pt demonstrated 40% accuracy with organization and the math. Task completed with SLP with mod verbal cues. Pt performed a functional math task x 60% accuracy independently, improving to 100% with moderate cues for attention to detail. Pt stated \"I see how I get confused\". When asked how pt could improve this. Pt stated \"I could concentrate more and go more slowly\".     Other Barriers to Discharge (Family Training, etc): family training: IADL assist    "

## 2024-01-14 NOTE — PROGRESS NOTES
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 SBA with footwear seated   Bathing: Recommend tub bench-pt declined initial shower  Toileting: CGA with toilet transfer, and min A with toilet hygiene with walker.   IADLs: Unable to correctly setup 0/4 medications with med box. Anticipate assistance with med mgmt upon discharge.   Vision/Cognition: further assessment     Assessment: Completed ADL routine with walker to increase IND and safety. Pt continues to require cues for safety with transfers due to posterior leaning.  Pt upset during session d/t spouse's mental state. Per pt, pt's daughter stopped assisting with cares d/t fear of being sued. Pt stating, \"I need to get home to my wife, I don't care if I die at home.\" Notified SW regarding updates of pt's concerns.     -10 d/t breakfast     Other Barriers to Discharge (DME, Family Training, etc): Pt reports having shower chair at home.   Family training prior to discharge           "

## 2024-01-14 NOTE — PLAN OF CARE
Goal Outcome Evaluation:      Plan of Care Reviewed With: patient    Overall Patient Progress: no change    Alert, oriented x3, disoriented to place. Pt states he is at [facility], easily redirected. On 2L per NC with sats above 90%. Denies pain or shortness of breath. Left PIV intact. HS blood glucose is 137.  Bed alarm intact, call light in reach.

## 2024-01-14 NOTE — PLAN OF CARE
Goal Outcome Evaluation:    Patient alert and oriented with some forgetfulness. Requested for bathroom use at start of shift, voided and had a medium BM. Been continent of both B/B this whole shift. Received wearing O2 2L via NC. SpO2 98-99%. No pain or discomfort reported overnight. Comfortable this time. BG at 0200 AM was 53, asymptomatic, denies unusualities. Patient drank 2 cups apple juice, 2 marlyn crackers and he also requested for milk. BG rechecked with results of 68 and 101. BG was again checked at 0429 AM with result of 190. Fall precautions maintained, call light in reach, safety checks completed.      Continue with POC

## 2024-01-14 NOTE — PROGRESS NOTES
"  [facility]   Acute Rehabilitation Unit  Weekend Progress Note    INTERVAL HISTORY  Tc Garcia was seen and examined at bedside.  No acute events reported overnight. Patient is doing well overall. Therapies going well.  Patient interested in talking about adjustment to disability and situation with poor health of wife. Provider team provided empathy and reassurance that patient is doing the right thing getting better so he can care for his wife.      Functionally, Patient continues to be a full participant with therapies.      ROS: 10 point ROS negative other than the symptoms noted above in the HPI.    MEDICATIONS   acetaminophen  650 mg Oral TID    amLODIPine  10 mg Oral Daily    carvedilol  12.5 mg Oral BID w/meals    enoxaparin ANTICOAGULANT  40 mg Subcutaneous Q24H    insulin aspart   Subcutaneous Daily with breakfast    insulin aspart   Subcutaneous Daily with lunch    insulin aspart   Subcutaneous Daily with supper    insulin aspart  1-7 Units Subcutaneous TID AC    insulin aspart  1-5 Units Subcutaneous BID    insulin glargine  7 Units Subcutaneous Q24H    irbesartan  300 mg Oral At Bedtime    isosorbide mononitrate  60 mg Oral QPM    lidocaine  1 patch Transdermal Q24h    ramelteon  8 mg Oral At Bedtime    tamsulosin  0.4 mg Oral QAM        acetaminophen, glucose **OR** dextrose **OR** glucagon, insulin aspart, menthol (Topical Analgesic) 2.5%, - MEDICATION INSTRUCTIONS -, senna-docusate     PHYSICAL EXAM  BP (!) 143/75 (BP Location: Right arm, Patient Position: Sitting)   Pulse 74   Temp 98.1  F (36.7  C) (Oral)   Resp 18   Ht 1.753 m (5' 9\")   Wt 63 kg (139 lb)   SpO2 97%   BMI 20.53 kg/m      General: No acute distress. Lying upright in bed.  Cardiovascular: Regular rate and rhythm  Pulmonary: Breathing comfortably on room air, clear to auscultation bilaterally  Abdominal: Non-tender, non-distended, bowel sounds present in all four quadrants   Extremities: " Warm, well perfused, no edema in bilateral lower extremities, no tenderness in calves   Neuro/MSK:  Alert. No gross changes to neuro exam compared to prior.     LABS  Recent Results (from the past 24 hour(s))   Glucose by meter    Collection Time: 01/13/24 11:27 AM   Result Value Ref Range    GLUCOSE BY METER POCT 197 (H) 70 - 99 mg/dL   Glucose by meter    Collection Time: 01/13/24  5:27 PM   Result Value Ref Range    GLUCOSE BY METER POCT 201 (H) 70 - 99 mg/dL   Glucose by meter    Collection Time: 01/13/24  9:21 PM   Result Value Ref Range    GLUCOSE BY METER POCT 137 (H) 70 - 99 mg/dL   Glucose by meter    Collection Time: 01/14/24  1:50 AM   Result Value Ref Range    GLUCOSE BY METER POCT 53 (L) 70 - 99 mg/dL   Glucose by meter    Collection Time: 01/14/24  2:13 AM   Result Value Ref Range    GLUCOSE BY METER POCT 68 (L) 70 - 99 mg/dL   Glucose by meter    Collection Time: 01/14/24  2:35 AM   Result Value Ref Range    GLUCOSE BY METER POCT 101 (H) 70 - 99 mg/dL   Glucose by meter    Collection Time: 01/14/24  4:29 AM   Result Value Ref Range    GLUCOSE BY METER POCT 190 (H) 70 - 99 mg/dL   Basic metabolic panel    Collection Time: 01/14/24  6:14 AM   Result Value Ref Range    Sodium 135 135 - 145 mmol/L    Potassium 4.8 3.4 - 5.3 mmol/L    Chloride 104 98 - 107 mmol/L    Carbon Dioxide (CO2) 28 22 - 29 mmol/L    Anion Gap 3 (L) 7 - 15 mmol/L    Urea Nitrogen 20.3 8.0 - 23.0 mg/dL    Creatinine 1.44 (H) 0.67 - 1.17 mg/dL    GFR Estimate 48 (L) >60 mL/min/1.73m2    Calcium 8.4 (L) 8.8 - 10.2 mg/dL    Glucose 253 (H) 70 - 99 mg/dL   Glucose by meter    Collection Time: 01/14/24  7:35 AM   Result Value Ref Range    GLUCOSE BY METER POCT 290 (H) 70 - 99 mg/dL         ASSESSMENT AND PLAN  Tc Garcia is a 83 year old right hand dominant male with a PMH of DM2 with DKA, paroxysmal atrial fibrillation formerly on apixaban, hypertension, hyperlipidemia, pelural effusion, CKD stage 3, BPH, and ACD who sustained a left  cerebellar intracranial hemorrhage from a fall on 12/11/23 with a possible left medial frontal lobe infarct on imaging.  His deficits include decreased visual acuity and mild left hemiparesis. He was admitted to acute inpatient rehabilitation on 12/27/23.         Rehabilitation - Continue comprehensive acute inpatient rehabilitation program with multidisciplinary approach including therapies, rehab nursing, and physiatry following. See interval history for updates.      - Vitals stable. Labs reviewed.   - Continue ongoing medical management.  - Continue therapies and plan of care.  - Refer to last progress note from primary team for full problems list.  - BMP Cr 1.44 today 1/14. Primary team can consider addt fluid bolus 1/15 as indicated.   - Rehab psych consult placed. Patient interested in talking about adjustment to disability and situation with poor health of wife.     Patient seen and discussed with Dr. Blackwood,  PM&R Staff Physician    Ashok Owen,   PGY-3 Physical Medicine & Rehabilitation

## 2024-01-14 NOTE — PLAN OF CARE
Goal Outcome Evaluation:      Plan of Care Reviewed With: patient    Overall Patient Progress: no changeOverall Patient Progress: no change    Outcome Evaluation: Pt alert and oriented with forgetfulness. Denied pain. 1 L IV bolus done at 1840. Offered to toilet pt but he declined. BM smear on pull up so changed. Mepilex on coccyx coming off so changed. Assist of 1 with walker. Good appetite with dinner. Got back to bed 1845 and O2 at 2L placed on him.

## 2024-01-14 NOTE — PROGRESS NOTES
Patient AxOx4, on RA. VSS. Working with therapy this shift, tolerated well, see notes for details. Cont x2 this shift. PIV to left wrist, flushed. Endorses pain on lower back, heat applied, relieves pain per pt.  Bed in low locked position, bed alarm on and call bell in reach. At this time, pt denies any needs or concerns.

## 2024-01-14 NOTE — PLAN OF CARE
Discharge Planner Post-Acute Rehab SLP:      Discharge Plan: home vs [facility] with  SLP. 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 Moderate reasoning, mild attention and memory deficits during tasks.   Swallow: regular, thin (0) liquid. NO STRAWS      Assessment: Pt performed 4 step sequencing of common tasks x 80% accuracy due to impulsivity when reading/placing steps. With verbal cues to read each step aloud prior to performing the task. With cues, accuracy improves to 100%.     Other Barriers to Discharge (Family Training, etc): family training: IADL assist

## 2024-01-15 NOTE — PROGRESS NOTES
Patient AxOx4, on RA. VSS. Working with therapy this shift, tolerated well, see notes for details. Cont x2. LBM 1/14. No complaints of pain this shift. Per pt, lidocaine patch to back helps relieve pain. LR bolus continuous started on pt. Ax1 with walker. Bed in low locked position, bed alarm on and call bell in reach. At this time, pt denies any needs or concerns.

## 2024-01-15 NOTE — PLAN OF CARE
Goal Outcome Evaluation:    Patient alert and oriented but can be forgetful. Called at around 0115 AM stating his blood sugar was low and that he was hungry. BG checked was 103. Given tomato soup and 1 pack marlyn cracker and patient drank water after. Patient also requested for his BG to be checked at 0613 AM with result of 192. Continent of bladder this shift. No BM. Wore O2 2L via NC, SpO2 satisfactory. No pain reported overnight. Fall precautions maintained, call light in reach, safety checks completed.      Continue with POC

## 2024-01-15 NOTE — PLAN OF CARE
"/62 (BP Location: Right arm)   Pulse 64   Temp 97.5  F (36.4  C) (Oral)   Resp 16   Ht 1.753 m (5' 9\")   Wt 63 kg (139 lb)   SpO2 99%   BMI 20.53 kg/m       [id number]    Pt is alert, able to make needs known. Moderate Consistent Carb diet, take medications whole with water. Denies pain or shortness of breath. Continent of bowel and bladder. Last BM 1/14/24. Left PIV saline locked. Call light within reach. Continue with POC  "

## 2024-01-15 NOTE — PLAN OF CARE
Discharge Planner Post-Acute Rehab SLP:      Discharge Plan: home vs [facility] with  SLP. 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 Moderate reasoning, mild attention and memory deficits during tasks.   Swallow: regular, thin (0) liquid. NO STRAWS      Assessment: Pt participated in checkbook balance task. Pt able to recall strategies introduced from prior session and IND sequencing items to balance. Required overall min-mod cues to correctly identify order # and identify/correct errors. Pt wiht significant difficulties with mental calculations and required max cues to identify and reason through produced errors. Significantly impaired organization, working memory, alternating attention. Required max cues to organize and complete correctly.     Other Barriers to Discharge (Family Training, etc): family training: IADL assist

## 2024-01-15 NOTE — PROGRESS NOTES
"BERTHA reached out to Sanna with [facility] regarding senior living referrals. Sanna will send SW a list of ALFs that pt's family had looked into.    BERTHA met with pt at the end of the day. Pt is open to going to an [facility], but did ask SW about going home a few times. BERTHA explained to pt that home services would need to be set up prior to discharge and these would be out of pocket. Pt told SW he will \"arrange the finances once I'm home\". BERTHA will follow up with pt tomorrow.    JERE Escobedo  Post Acute Float   [facility]    Phone: [phone]  Fax: [phone]    "

## 2024-01-15 NOTE — PLAN OF CARE
Discharge Planner Post-Acute Rehab PT:     Discharge Plan: TBD - pt and spouse in need of cares/FPC    Precautions: Alarms    Current Status:  Bed Mobility: Supervision  Transfer: CGA/min A FWW  Gait: -200' FWW  Stairs: CGA B rail  Balance: Able to stand w/o support - slight posterior lean, braces against objects w/ transfers  Fall Class completed:  1/6/24    Outcome Measures:   Ibarra 1/1 = 20/56  Ibarra 1/8 = 24/56    10MWT - Comfortable Pace  - .35 m/s on 1/8    Assessment: pt cont to work hard in PT. Pt still limited by general weakness and fatigue,pt demo amb with mild flexed posture with limited L foot clearance noted L foot slide. No change with V.c close SBA to CGA for all functional mob with WW for support. Pt on chair alarm. .    Other Barriers to Discharge (DME, Family Training, etc):   Caregiver role for his spouse  Cognition limiting home safety

## 2024-01-15 NOTE — PLAN OF CARE
Goal Outcome Evaluation:      Plan of Care Reviewed With: patient    Overall Patient Progress: improvingOverall Patient Progress: improving    Outcome Evaluation: Pt alert and oriented but forgetful. Vitals and BG at baseline. Denied pain. Good appetite with dinner. Voided in the bathroom. Continent. Assist of 1 walker. Alarms on.

## 2024-01-15 NOTE — PROGRESS NOTES
[facility]   Acute Rehabilitation Unit    INTERVAL HISTORY  Notes and labs reviewed over past 24 hours. No acute overnight events reported    Patient was seen and examined. He was quite upset this morning in regards to response time for call lights overnight. His frustrations were heard and we apologized. He was appreciative of the time spent with him. He reports his back pain is much improved with scheduled tylenol and lidocaine patches.     ROS: 10 point ROS was assessed and was negative unless otherwise stated in HPI      Functionally,    With PT: 1/13  Current Status:  Bed Mobility: Supervision  Transfer: CGA/min A FWW  Gait: -200' FWW  Stairs: CGA B rail  Balance: Able to stand w/o support - slight posterior lean, braces against objects w/ transfers  Fall Class completed:  1/6/24     Outcome Measures:   Ibarra 1/1 = 20/56  Ibarra 1/8 = 24/56     10MWT - Comfortable Pace  - .35 m/s on 1/8     Assessment: Progressing with postural control training and midline orientation with decreased UE support including multi-directional massed stepping practice and dynamic weight shifts in standing.    With OT: 1/14    Current Status:  ADLs:  Mobility: CGA with walker, intermittent posterior leaning  Grooming: CGA standing with walker, set-up seated  Dressing: set-up with UBD seated, CGA with LBD tasks with walker. SBA with footwear seated   Bathing: Recommend tub bench-pt declined initial shower  Toileting: CGA with toilet transfer, and min A with toilet hygiene with walker.   IADLs: Unable to correctly setup 0/4 medications with med box. Anticipate assistance with med mgmt upon discharge.   Vision/Cognition: further assessment       With SLP: 1/14    Current Status:  Hearing: WFL  Vision: Glasses  Communication: WFL  Cognition: WNL per CLQT. Moderate reasoning, mild attention and memory deficits during tasks.   Swallow: regular, thin (0) liquid. NO STRAWS      Assessment: Pt  "performed 4 step sequencing of common tasks x 80% accuracy due to impulsivity when reading/placing steps. With verbal cues to read each step aloud prior to performing the task. With cues, accuracy improves to 100%.          MEDICATIONS  Scheduled meds   acetaminophen  650 mg Oral TID    amLODIPine  10 mg Oral Daily    carvedilol  12.5 mg Oral BID w/meals    enoxaparin ANTICOAGULANT  40 mg Subcutaneous Q24H    insulin aspart   Subcutaneous Daily with breakfast    insulin aspart   Subcutaneous Daily with lunch    insulin aspart   Subcutaneous Daily with supper    insulin aspart  1-7 Units Subcutaneous TID AC    insulin aspart  1-5 Units Subcutaneous BID    insulin glargine  7 Units Subcutaneous Q24H    irbesartan  300 mg Oral At Bedtime    isosorbide mononitrate  60 mg Oral QPM    lidocaine  1 patch Transdermal Q24h    ramelteon  8 mg Oral At Bedtime    tamsulosin  0.4 mg Oral QAM       PRN meds:  acetaminophen, glucose **OR** dextrose **OR** glucagon, insulin aspart, menthol (Topical Analgesic) 2.5%, - MEDICATION INSTRUCTIONS -, senna-docusate      PHYSICAL EXAM  /69 (BP Location: Right arm, Patient Position: Sitting)   Pulse 63   Temp 97.5  F (36.4  C) (Oral)   Resp 16   Ht 1.753 m (5' 9\")   Wt 63 kg (139 lb)   SpO2 96%   BMI 20.53 kg/m    General: in no acute distress, conversational and alert, resting comfortably in bed  HEENT: atraumatic, nares clear, conjunctiva white  Pulmonary: on room air, lungs clear to auscultation bilaterally  Cardiovascular: RRR, no murmur auscultated, well-perfused peripherally  Abdominal: soft, non-tender to palpation, non-distended  Extremities: warm peripherally, no edema in BLEs  Skin: warm, dry, without erythema, ecchymosis, or rash noted  MSK/Neuro: Moves all 4 extremities volitionally and against gravity. A&O X3. Able to listen to and follow commands appropriately. Speech coherent and comprehensible.       LABS  Results for orders placed or performed during the " hospital encounter of 12/29/23 (from the past 24 hour(s))   Glucose by meter   Result Value Ref Range    GLUCOSE BY METER POCT 222 (H) 70 - 99 mg/dL   Glucose by meter   Result Value Ref Range    GLUCOSE BY METER POCT 212 (H) 70 - 99 mg/dL   Glucose by meter   Result Value Ref Range    GLUCOSE BY METER POCT 131 (H) 70 - 99 mg/dL   Glucose by meter   Result Value Ref Range    GLUCOSE BY METER POCT 103 (H) 70 - 99 mg/dL   Glucose by meter   Result Value Ref Range    GLUCOSE BY METER POCT 192 (H) 70 - 99 mg/dL   CBC with Platelets & Differential    Narrative    The following orders were created for panel order CBC with Platelets & Differential.  Procedure                               Abnormality         Status                     ---------                               -----------         ------                     CBC with platelets and d...[[id number]]  Abnormal            Final result                 Please view results for these tests on the individual orders.   Basic metabolic panel   Result Value Ref Range    Sodium 136 135 - 145 mmol/L    Potassium 4.5 3.4 - 5.3 mmol/L    Chloride 103 98 - 107 mmol/L    Carbon Dioxide (CO2) 26 22 - 29 mmol/L    Anion Gap 7 7 - 15 mmol/L    Urea Nitrogen 20.1 8.0 - 23.0 mg/dL    Creatinine 1.38 (H) 0.67 - 1.17 mg/dL    GFR Estimate 50 (L) >60 mL/min/1.73m2    Calcium 8.8 8.8 - 10.2 mg/dL    Glucose 245 (H) 70 - 99 mg/dL   CBC with platelets and differential   Result Value Ref Range    WBC Count 9.1 4.0 - 11.0 10e3/uL    RBC Count 3.55 (L) 4.40 - 5.90 10e6/uL    Hemoglobin 9.7 (L) 13.3 - 17.7 g/dL    Hematocrit 32.0 (L) 40.0 - 53.0 %    MCV 90 78 - 100 fL    MCH 27.3 26.5 - 33.0 pg    MCHC 30.3 (L) 31.5 - 36.5 g/dL    RDW 17.9 (H) 10.0 - 15.0 %    Platelet Count 338 150 - 450 10e3/uL    % Neutrophils 72 %    % Lymphocytes 9 %    % Monocytes 9 %    % Eosinophils 7 %    % Basophils 2 %    % Immature Granulocytes 1 %    NRBCs per 100 WBC 0 <1 /100    Absolute Neutrophils 6.6 1.6 -  8.3 10e3/uL    Absolute Lymphocytes 0.8 0.8 - 5.3 10e3/uL    Absolute Monocytes 0.8 0.0 - 1.3 10e3/uL    Absolute Eosinophils 0.6 0.0 - 0.7 10e3/uL    Absolute Basophils 0.1 0.0 - 0.2 10e3/uL    Absolute Immature Granulocytes 0.1 <=0.4 10e3/uL    Absolute NRBCs 0.0 10e3/uL   Glucose by meter   Result Value Ref Range    GLUCOSE BY METER POCT 227 (H) 70 - 99 mg/dL       ASSESSMENT AND PLAN    Tc Garcia is a 83 year old right hand dominant male with a PMH of DM2 with DKA, paroxysmal atrial fibrillation formerly on apixaban, hypertension, hyperlipidemia, pelural effusion, CKD stage 3, BPH, and ACD who sustained a left cerebellar intracranial hemorrhage from a fall on 12/11/23 with a possible left medial frontal lobe infarct on imaging.  His deficits include decreased visual acuity and mild left hemiparesis. He was admitted to acute inpatient rehabilitation on 12/27/23.     Impairment group code: 02.22 Traumatic, Closed Injury; Fall with resulting intracranial hemorrhage        PT, OT and SLP 60 minutes of each on a daily basis, in addition to rehab nursing and close management of physiatrist.       Impairment of ADL's: OT for 60 min daily to work on upper and lower body self care, dressing, toileting, bathing, energy conservation techniques with use of ADs as needed.      Impairment of mobility:  PT for 60 min daily to work on gait exercises, strengthening, endurance buildup, transfers with use of walker as needed.      Impairment of cognition/language/swallow:  SLP for 60 min daily for cognitive evaluation and treatment strategies for higher level cognitive deficits and memory impairment.      Rehab RN to administer medication, patient education on medication taking, VS monitoring, bowel regimen, glucose monitoring and wound care/surgical wound dressing changes and monitoring.            Medical Conditions     #Traumatic brain injury  #Intraparenchymal hematoma    #Mild hydrocephalus   #H/o spontaneous intracranial  hemorrhage (1/2023)  paroxysmal atrial fibrillation (on eliquis prior to admission)  Presented with dizziness diplopia and nausea. Workup found 1.5 X 3 cm intraparenchymal hematoma centered in inferior cerebellar vermis with likely extension into fourth ventricle; no hydrocephalus. Etiology ruled likely hypertensive in setting of chronic anticoagulation after fall.    - follow up with stroke neurology 6-8 weeks from 12/27  --Had left hand weakness on 12/31 and stat head CT showed no new intracranial abnormalities  -Continue PT, OT, SLP  -Underwent neuropsych testing, awaiting final report.       #Chronic C7 compression fracture  #Mid thoracic Back pain  No dynamic instability of C7 fracture  -01/12, due to persistent back pain, scheduled tylenol 650mg TID with additional PRN available. Also scheduled lidocaine patches.         #Chronic diastolic CHF  #Atrial fibrillation PTA on Eliquis  #Hypertension  #Hyperlipidemia  Previously on apixaban, held after ICH. Anticoagulation was reversed and held. Echocardiogram with EF of 60 to 65%.  Severe biatrial enlargement.  Mild to moderate TR.  - lovenox ppx  - stroke neurology in 6-8 weeks  - BP goal systolic 130-150  - continue amlodipine  - continue carvedilol  - continue avapro  - hold PTA demadex  - follow up with cardiology in 1 month to discuss Watchman        #Diabetic ketoacidosis  #Diabetes mellitus type II HgbA1c 8.1% 12/12/23  Patient's insulin pump had been on hold since admission in the setting of IPH. Blood sugars labile during hospital stay. Patient went into DKA on 12/24/2023.  Likely triggered by infection with rise in the WBC count hence pump discontinued and patient switched to insulin drip per DKA protocol. DKA resolved on 12/25/2023, but then he had another episode of likely DKA on morning of 12/28/23 which may have been due to patient confusion about use of pump / injectable insulin  - Endo consulted and signed off. 1/02, restarted PTA insulin pump, but  due to cognitive impairments to properly and safely administer insulin, giving excess or too little insulin, he was taken off of the insulin pump and placed on basal lantus and carb coverage novolog. Currently on:  -  Lantus 7 unit(s) q24 hours at 1800 hrs  -  Novolog 1 unit(s) per 10 g cho breakfast, per primary service: 1:18 g cho lunch/dinner and snacks.  Cover all intake.                 -  Novolog custom 1/65 resistance TID AC/HS and 0200                  -  BG TID AC/HS and 0200     #Adjustment Disorder   Patient is feeling down and hopless with what he feels is little progression functionally. Reporting he is worried about his health status and what will happen to his wife if he is unable to care for her.   -Clinical Psychology consult placed to evaluate and treat        #Insomnia, improving  -1/08 continue remelteon and melatonin        #Possible aspiration pneumonia.  Agitation, leukocytosis on 12/12 to 12/13. No growth blood cultures.  CXR R > L lower lung opacity.  Completed IV ceftriaxone - cefuroxime course.  - CXR in ~ 4 weeks (~1/27/24)  - Incentive spirometry  - aspiration precautions     #Onychomycosis  -Podiatry saw and debrided all 10 toenails. Will reconsult if any new concerns arise.      #Presumed sepsis likely due to UTI  Patient went into DKA with rising white count and mildly positive UA.  Of note, patient had just completed treatment for blood pneumonia UTI, question if this was not completely treated.  Urine culture with no growth. Started on vanc/zosyn and narrowed to ceftriaxone 12/27/23  - Completed 7 day total course of abx with ceftriaxone on 1/2/24        #CKD Stage 3  Baseline creatinine 1-1.1  - continue irbesartan  - consider readding torsemide prn for edema  - 1/15, patient has received multiple bolus for IV hydration. Creatinine slightly improved to 1.38. Will give maintenance fluids overnight and repeat BMP on 1/17. Will keep PIV in place for now, anticipating future IV fluids  in upcoming days.      #Anemia of chronic disease  - Continue to assess with Monday and Thursday labs.   -Hgb stable and increased to 9.7 from 8.8 on 1/08. Suspect partly due to hemoconcentration. Continue to Monitor CBC every monday     Adjustment to disability:  Clinical psychology consulted, to eval and treat  Bowel: PRN meds available  Bladder: Continent  DVT Prophylaxis: Lovenox  GI Prophylaxis: On regular diet, will assess for GI symptoms  Code: DNR/DNI  Disposition: Home vs. Alternate level of care.  ELOS:   Pending disposition and placement  Follow up Appointments on Discharge:   -Primary care provider, Jessika Cordoba in 1-2 weeks from discharge  Stroke neurology in 6-8 weeks from 12/27/23  cardiology in 1 month from 12/27/23  CXR in ~ 1/27/24  Diabetes follow up: Dr Asif at [facility] of Endocrinology         Seen and discussed with Dr. Paez, PM&R staff physician     El Escalera DO  PGY2  Physical Medicine and Rehabilitation-[facility]  Pager: [phone]

## 2024-01-15 NOTE — PROGRESS NOTES
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 SBA with footwear seated   Bathing: Recommend tub bench-pt declined initial shower  Toileting: CGA with toilet transfer, and min A with toilet hygiene with walker.   IADLs: Unable to correctly setup 0/4 medications with med box. Anticipate assistance with med mgmt upon discharge.   Vision/Cognition: further assessment     Assessment: Completed ADL routine with walker to increase IND and safety. Pt continues to require cues for safety with approach to transfers/functional mobility.     -10 d/t lab visit      Other Barriers to Discharge (DME, Family Training, etc): Pt reports having shower chair at home.   Family training prior to discharge

## 2024-01-16 NOTE — PROGRESS NOTES
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 SBA with footwear seated   Bathing: Recommend tub bench-pt declined initial shower  Toileting: CGA with toilet transfer, and min A with toilet hygiene with walker.   IADLs: Unable to correctly setup 0/4 medications with med box. Anticipate assistance with med mgmt upon discharge.   Vision/Cognition: further assessment     Assessment: Completed ADL routine with walker to increase IND and safety. Pt reporting difficulty with sleeping and increased anxiety, notified MD and BERTHA. Pt continues to require cues for safety with approach to transfers/functional mobility.      -5 d/t IV mgmt     Other Barriers to Discharge (DME, Family Training, etc): Pt reports having shower chair at home.   Family training prior to discharge

## 2024-01-16 NOTE — PLAN OF CARE
Goal Outcome Evaluation:    Outcome Evaluation: Pt AxO with mild forgetfulness, redirectable. Able to make needs known. VSS, denied pain. On regular texture, thin liquid diet. Able to take his pills whole with thin liquids. BG checks done, insulin given as ordered. A1 with the walker and gait belt. Continent of bowel and bladder, able to use the bathroom, had BM this AM shift. L PIV in place, intact and flushed, saline locked. Had team rounds this AM shift. Participated in therapies. Continue with POC.

## 2024-01-16 NOTE — PLAN OF CARE
Patient alert and oriented with intermittent confusion. Was awake most of this shift. Denied pain or discomfort. X1 assist to the bathroom. Voiding adequately without concern. BG within parameters. Left PIV infusing LR at 125 ml/hr. Resting in bed with no acute distress noted. Call light within reach. Continue with plan of care.

## 2024-01-16 NOTE — PLAN OF CARE
Discharge Planner Post-Acute Rehab PT:     Discharge Plan: TBD - pt and spouse in need of cares/long term    Precautions: Alarms    Current Status:  Bed Mobility: Supervision  Transfer: CGA/min A FWW  Gait: -200' FWW  Stairs: CGA B rail  Balance: Able to stand w/o support - slight posterior lean, braces against objects w/ transfers  Fall Class completed:  1/6/24    Outcome Measures:   Ibarra 1/1 = 20/56  Ibarra 1/8 = 24/56    10MWT - Comfortable Pace  - .35 m/s on 1/8                - .42 m/s on 1/16    Assessment: Pt demonstrated improved comfortable pace walking speed today within range for a home ambulator. Assessed half of Ibarra before session had to be cut short due to incontinent BM, to be re-administered 1/17.     Other Barriers to Discharge (DME, Family Training, etc):   Caregiver role for his spouse  Cognition limiting home safety

## 2024-01-16 NOTE — PROGRESS NOTES
Team rounds today. Discharge disposition unknown ([facility] vs home with 24/7 support). Pt's daughter, Camille, was managing pt's finances, but told BERTHA she won't be involved in any of pt's care going forward. BERTHA met with pt and asked if BERTHA could reach out to his son to coordinate finances for in-home support, pt agreed. Son, Maxx, lives in [facility]. BERTHA does not have Maxx's contact information - BERTHA asked pt for this information, pt said it is difficult to call Maxx, he took BERTHA's card and will share SW contact information with Maxx.    BERTHA spoke the Senior Linkage Line to look into additional private pay in-home services. BERTHA sent inquiries to:    [facility]  [employer]  [employer]   [employer]    JERE Escobedo  Post Acute Float   [facility]    Phone: [phone]  Fax: [phone]

## 2024-01-16 NOTE — PROGRESS NOTES
Acute Rehab Care Conference/Team Rounds      Type: Team Rounds    Present: Dr. Paez, Resident Dr. Escalera, Dr. Maria T Ambriz Neuropsychologist, Elan Yost PT, Sia Walker OT, Mariposa Jang SLP, Denita Avery , Naz Johnson RD, Tanja Hernandez RN, and Tc Garcia Patient.      Discharge Barriers/Treatment/Education    Rehab Diagnosis: post fall with ICH     Active Medical Co-morbidities/Prognosis:     Patient Active Problem List   Diagnosis Code     DM type 2, Hgb A1C 8.2 on 4/25/20 E11.9     Mixed hyperlipidemia E78.2     Essential hypertension I10     Pain in limb M79.609     Plantar fascial fibromatosis M72.2     HYPERLIPIDEMIA LDL GOAL <100 E78.5     Hemothorax on left J94.2     Recurrent Left Pleural effusion -- S/P Pleurex Cath 5/12/20 J90     ABBIE (acute kidney injury) (H24) N17.9     Acute respiratory failure with hypoxia (H) J96.01     Pulmonary hypertension (H) I27.20     CRF (chronic renal failure), stage 3  N18.30     Anemia due to chronic kidney disease N18.9, D63.1     Atrial fibrillation/flutter -- on Eliquis and Amiodarone I48.91     DKA (diabetic ketoacidoses) E11.10     Anemia in CKD (chronic kidney disease) N18.9, D63.1     Dyspnea R06.00     SOB (shortness of breath) R06.02     Non-recurrent bilateral inguinal hernia without obstruction or gangrene K40.20     Acute cholecystitis K81.0     Abdominal pain, generalized R10.84     Non-intractable vomiting with nausea, unspecified vomiting type R11.2     Alcohol dependence (H) F10.20     CHF (congestive heart failure) (H) I50.9     Syncope and collapse R55     Weakness R53.1     Postoperative state Z98.890     Acute kidney injury (H24) N17.9     Chronic diastolic heart failure (H) I50.32     Anticoagulated Z79.01     Right-sided nontraumatic intraventricular intracerebral hemorrhage (H) I61.5     Dehydration E86.0     Hypoglycemia E16.2     Hypoxia R09.02     DNR (do not resuscitate) Z66     Hypotension, unspecified  hypotension type I95.9     Diabetic nephropathy associated with type 2 diabetes mellitus (H) E11.21     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, or unspecified(403.00) I12.9     Nephrotic range proteinuria R80.9     Secondary hyperparathyroidism of renal origin (H24) N25.81     Stage 3b chronic kidney disease (H) N18.32     Vitamin D deficiency E55.9     Diabetic ketoacidosis without coma associated with type 2 diabetes mellitus (H) E11.10     Intraparenchymal hemorrhage of brain (H) I61.9     Acute cystitis without hematuria N30.00     Constipation, unspecified constipation type K59.00     Pain in extremity, unspecified extremity M79.609     Type 2 diabetes mellitus with other specified complication, with long-term current use of insulin (H) E11.69, Z79.4     Nontraumatic intraventricular intracerebral hemorrhage, unspecified laterality (H) I61.5     Iron deficiency anemia D50.9     Intracranial hemorrhage (H) I62.9     Hyperglycemia R73.9     Inadequately controlled diabetes mellitus (H) E11.65       Safety: X1 assist with walker and gait belt    Pain: Denied pain or discomfort    Medications, Skin, Tubes/Lines:    Swallowing/Nutrition: regular, thin (0) liquid. NO STRAWS     Bowel/Bladder:  Mixed continence     Psychosocial: Pt lives with wife in a house, is primary caregiver for wife with dementia.  Pt's spouse can physically assist, she has dementia and pt would like to get home as soon as possible due to this.  Pt was driving prior but not anymore. [facility] in the past.      ADLs/IADLs: Pt is making slow progress with ADLs d/t impaired balance, impaired cognition, and posterior lean. Pt continues to require SBA with UBD and CGA with LBD tasks with walker. Pt requires CGA with toileting with walker and intermittently assistance with varghese-cares following a BM. Pt requires CGA with G/H tasks standing at sink with walker. Pt previously caregiver for spouse who  has cognitive deficits. Recommending pt have assistance with ADLs/IADLs and mobility d/t current deficits, and unable to safely provide care for spouse. HC OT services. Recommending hired in 24/7 assistance vs skilled nursing upon discharge.     Mobility: Pt is making slow progress with therapies in regards to independence. Continues to require CGA for functional mobility with fluctuating balance and posterior lean. Cognition and baseline function limiting progression to mod-I. Spouse has cognitive deficits, so unable to safely provide supervision required. Pt/spouse looking into discharge to [facility] in light of home barriers. HH PT follow-up.  Ibarra 1/1 = 20/56  Ibarra 1/8 = 24/56    Cognition/Language:  Moderate deficits re: reasoning, mild-mod attention and memory deficits noted more subjectively across functional structured tasks. Scored WFL CLQT. Minimal progress overall, still requires moderate cueing for more complex reasoning tasks. Will need HH SLP and total assist IADLs from family.      Community Re-Entry: Home bound based on mobility and cognition    Transportation: Pt not a , transfer not a barrier    Decision maker: self    Plan of Care and goals reviewed and updated.    Discharge Plan/Recommendations    Fall Precautions: continue    Patient/Family input to goals: yes     Estimated length of stay: 24 days     Overall plan for the patient: reach a level of mod I       Utilization Review and Continued Stay Justification    Medical Necessity Criteria:    For any criteria that is not met, please document reason and plan for discharge, transfer, or modification of plan of care to address.    Requires intensive rehabilitation program to treat functional deficits?: Yes    Requires 3x per week or greater involvement of rehabilitation physician to oversee rehabilitation program?: Yes    Requires rehabilitation nursing interventions?: Yes    Patient is making functional progress?: Yes    There is a potential for additional  functional progress? Yes    Patient is participating in therapy 3 hours per day a minimum of 5 days per week or 15 hours per week in 7 day period?:Yes    Has discharge needs that require coordinated discharge planning approach?:Yes      Barriers/Concerns related to meeting medical necessity criteria:  none    Team Plan to Address Concern:  As needed      Final Physician Sign off    Statement of Approval:  Charlie Paez, DO      Patient Goals  Social Work Goals: Confirm discharge recommendations with therapy, coordinate safe discharge plan and remain available to support and assist as needed.     OT Predicted Duration/Target Date for Goal Attainment: 01/12/24  Therapy Frequency (OT): 6 times/week  OT: Hygiene/Grooming: modified independent  OT: Upper Body Dressing: Modified independent  OT: Lower Body Dressing: Modified independent  OT: Upper Body Bathing: Modified independent  OT: Lower Body Bathing: Modified independent  OT: Bed Mobility: Modified independent  OT: Transfer: Modified independent (shower)  OT: Toilet Transfer/Toileting: Modified independent  OT: Meal Preparation: Modified independent  OT: Home Management: Modified independent  OT: Cognitive: Patient/caregiver will verbalize understanding of cognitive assessment results/recommendations as needed for safe discharge planning     PT Predicted Duration/Target Date for Goal Attainment: 01/12/24  PT Frequency: 6x/week  PT: Bed Mobility: Modified independent, Supine to/from sit, Rolling  PT: Transfers: Modified independent, Bed to/from chair, Sit to/from stand  PT: Gait: Modified independent, Rolling walker, 150 feet  PT: Stairs: 3 stairs, Modified independent, Rail on right  PT: Goal 2: Pt to be sba to mod I with advanced car transfer     SLP Predicted Duration/Target Date for Goal Attainment: 01/06/24  Therapy Frequency (SLP Eval): 6 times/week  SLP: Safely tolerate diet without signs/symptoms of aspiration: Regular diet, Thin liquids, With use of  swallow precautions MET  SLP: Goal 1: Pt will demonstrate the ability to manage medications and finances x 100% accuracy with occasional cues.           Nursing Goals  Bowel and Bladder care  Fall prevention   Medication Education  Skin Care protection   Goal: Mobility: Pt will progress to MOD I prior to discharge from ARU    Goal: Skin Integrity: Pt will be free from skin breakdown related to pressure and moisture throughout the ARU stay     Goal: Safety Management: Pt will be free from falls/harm throughout the ARU stay by using call light appropriately and waiting for assistance as indicated

## 2024-01-16 NOTE — CONSULTS
"Start Time: 11:05 am  Stop Time: 11:32 am  Session Duration in Minutes: 27  Patient was seen remotely using iPad telemedicine system    REASON FOR CONSULTATION:   Psychology consulted to assess mental health status and provide emotional support.    BACKGROUND PER EMR:  Tc Garcia is a 83 year old right hand dominant male with a PMH of DM2 with DKA, paroxysmal atrial fibrillation formerly on apixaban, hypertension, hyperlipidemia, pelural effusion, CKD stage 3, BPH, and ACD who sustained a left cerebellar intracranial hemorrhage from a fall on 23 with a possible left medial frontal lobe infarct on imaging.  His deficits include decreased visual acuity and mild left hemiparesis. He was admitted to acute inpatient rehabilitation on 23.     SUBJECTIVE:  Met with Tc today to assess mental health status and and review experiences since admission to rehabilitation. He shared that he was feeling \"ok\" until recently. Stated that last night he became anxious about having another stroke and what that would mean for his family and his caregiving responsibilities for his wife. Described experiencing passive suicidal ideation, such that his family would be better off if he just . He explicitly denied intent or plan. Tc stated he has felt this way in the past, about a month ago, prior to admission to rehab. Stated he spoke to his children about his feelings, which helped. Also felt that coming to rehab helped his mood, as he is focused on getting better and going home. Tc identified protective factors including his children and needing to provide care for his wife.    Tc also described difficulties with sleep maintenance since he's been in the hospital. He is experiencing early awakenings around 1am or 2am and is unable to fall back asleep. This is when he tends to ruminates and has passive SI thoughts. Discussed strategies to assist with sleep maintenance, primarily diaphragmatic breathing. Introduced " "technique to patient with both counting his breath and \"square breathing.\" Tc was engaged in the visit and expressed understanding of the information provided.     OBJECTIVE:   Tc was lying back in his bed during our visit. He reported fair mood, but also reported passive suicidal ideation. He explicitly denied intent and/or plan. Thought process logical and goal-oriented. Speech WNL. Insight and judgment fair.    ASSESSMENT:  Patient with depressed mood and passive suicidal ideation likely exacerbated by poor sleep maintenance and psychosocial stressors. No history of major depressive disorder so likely an adjustment response to decline in health status while balancing caregiving for wife.     DIAGNOSIS:  Adjustment Disorder with depressed mood    RECOMMENDATION/PLAN:  (1) Encourage patient to practice diaphragmatic breathing for a few minutes each day.   (2) If patient is awake at night, encourage him to utilize diaphragmatic breathing.  (3) Consider medication to address both depressed mood and poor sleep maintenance.  (4) Psychology will follow at least once per week throughout rehabilitation admission.    Please feel free to call if urgent concerns arise prior to the next follow-up session.    Padmini Westfall, PhD, LP  Pager: [phone]   "

## 2024-01-16 NOTE — PLAN OF CARE
FOCUS/GOAL  Bowel management, Bladder management, Pain management, Mobility, Skin integrity, and Safety management    ASSESSMENT, INTERVENTIONS AND CONTINUING PLAN FOR GOAL:  Patient is alert and oriented can be forgetful. A-1 walker. Continent of b;;ladder in this shift can be mix incontinent per report. Last BM 1/14. Regular/thin BG monitored. Bolus IV running. VS WDL no care concern at this time. Patient denied pain, headache, dizziness, CP or SOB. Call light is in reach alarm is on.   Goal Outcome Evaluation:

## 2024-01-16 NOTE — PROGRESS NOTES
[facility]   Acute Rehabilitation Unit    INTERVAL HISTORY  Notes and labs reviewed over past 24 hours. No acute overnight events reported    Patient was seen and examined. He reports continued frustration due to his social and disposition situation. Ultimately he wants to get back home to help take care of his wife. He is amendable to starting antidepressant . He has no acute physical complaints at this time.     ROS: 10 point ROS was assessed and was negative unless otherwise stated in HPI      Functionally,    With PT: 1/15  Current Status:  Bed Mobility: Supervision  Transfer: CGA/min A FWW  Gait: -200' FWW  Stairs: CGA B rail  Balance: Able to stand w/o support - slight posterior lean, braces against objects w/ transfers  Fall Class completed:  1/6/24     Outcome Measures:   Ibarra 1/1 = 20/56  Ibarra 1/8 = 24/56     10MWT - Comfortable Pace  - .35 m/s on 1/8     Assessment: Progressing with postural control training and midline orientation with decreased UE support including multi-directional massed stepping practice and dynamic weight shifts in standing.    With OT: 1/15    Current Status:  ADLs:  Mobility: CGA with walker, intermittent posterior leaning  Grooming: CGA standing with walker, set-up seated  Dressing: set-up with UBD seated, CGA with LBD tasks with walker. SBA with footwear seated   Bathing: Recommend tub bench-pt declined initial shower  Toileting: CGA with toilet transfer, and min A with toilet hygiene with walker.   IADLs: Unable to correctly setup 0/4 medications with med box. Anticipate assistance with med mgmt upon discharge.   Vision/Cognition: further assessment       With SLP: 1/15    Current Status:  Hearing: WFL  Vision: Glasses  Communication: WFL  Cognition: WNL per CLQT. Moderate reasoning, mild attention and memory deficits during tasks.   Swallow: regular, thin (0) liquid. NO STRAWS              MEDICATIONS  Scheduled meds   acetaminophen  " 650 mg Oral TID    amLODIPine  10 mg Oral Daily    carvedilol  12.5 mg Oral BID w/meals    enoxaparin ANTICOAGULANT  40 mg Subcutaneous Q24H    insulin aspart   Subcutaneous Daily with breakfast    insulin aspart   Subcutaneous Daily with lunch    insulin aspart   Subcutaneous Daily with supper    insulin aspart  1-7 Units Subcutaneous TID AC    insulin aspart  1-5 Units Subcutaneous BID    insulin glargine  7 Units Subcutaneous Q24H    irbesartan  300 mg Oral At Bedtime    isosorbide mononitrate  60 mg Oral QPM    lidocaine  1 patch Transdermal Q24h    ramelteon  8 mg Oral At Bedtime    tamsulosin  0.4 mg Oral QAM       PRN meds:  acetaminophen, glucose **OR** dextrose **OR** glucagon, insulin aspart, menthol (Topical Analgesic) 2.5%, - MEDICATION INSTRUCTIONS -, senna-docusate      PHYSICAL EXAM  /65 (BP Location: Left arm, Patient Position: Semi-Bain's, Cuff Size: Adult Regular)   Pulse 74   Temp 97.9  F (36.6  C) (Oral)   Resp 14   Ht 1.753 m (5' 9\")   Wt 63 kg (139 lb)   SpO2 92%   BMI 20.53 kg/m    General: in no acute distress, conversational and alert, resting comfortably in bed  HEENT: atraumatic, nares clear, conjunctiva white  Pulmonary: on room air, lungs clear to auscultation bilaterally  Cardiovascular: RRR, no murmur auscultated, well-perfused peripherally  Abdominal: soft, non-tender to palpation, non-distended  Extremities: warm peripherally, no edema in BLEs  Skin: warm, dry, without erythema, ecchymosis, or rash noted  MSK/Neuro: Moves all 4 extremities volitionally and against gravity. A&O X3. Able to listen to and follow commands appropriately. Speech coherent and comprehensible.       LABS  Results for orders placed or performed during the hospital encounter of 12/29/23 (from the past 24 hour(s))   Glucose by meter   Result Value Ref Range    GLUCOSE BY METER POCT 242 (H) 70 - 99 mg/dL   Glucose by meter   Result Value Ref Range    GLUCOSE BY METER POCT 300 (H) 70 - 99 mg/dL "   Glucose by meter   Result Value Ref Range    GLUCOSE BY METER POCT 322 (H) 70 - 99 mg/dL   Glucose by meter   Result Value Ref Range    GLUCOSE BY METER POCT 201 (H) 70 - 99 mg/dL   Glucose by meter   Result Value Ref Range    GLUCOSE BY METER POCT 277 (H) 70 - 99 mg/dL       ASSESSMENT AND PLAN    Tc Garcia is a 83 year old right hand dominant male with a PMH of DM2 with DKA, paroxysmal atrial fibrillation formerly on apixaban, hypertension, hyperlipidemia, pelural effusion, CKD stage 3, BPH, and ACD who sustained a left cerebellar intracranial hemorrhage from a fall on 12/11/23 with a possible left medial frontal lobe infarct on imaging.  His deficits include decreased visual acuity and mild left hemiparesis. He was admitted to acute inpatient rehabilitation on 12/27/23.     Impairment group code: 02.22 Traumatic, Closed Injury; Fall with resulting intracranial hemorrhage        PT, OT and SLP 60 minutes of each on a daily basis, in addition to rehab nursing and close management of physiatrist.       Impairment of ADL's: OT for 60 min daily to work on upper and lower body self care, dressing, toileting, bathing, energy conservation techniques with use of ADs as needed.      Impairment of mobility:  PT for 60 min daily to work on gait exercises, strengthening, endurance buildup, transfers with use of walker as needed.      Impairment of cognition/language/swallow:  SLP for 60 min daily for cognitive evaluation and treatment strategies for higher level cognitive deficits and memory impairment.      Rehab RN to administer medication, patient education on medication taking, VS monitoring, bowel regimen, glucose monitoring and wound care/surgical wound dressing changes and monitoring.            Medical Conditions     #Traumatic brain injury  #Intraparenchymal hematoma    #Mild hydrocephalus   #H/o spontaneous intracranial hemorrhage (1/2023)  paroxysmal atrial fibrillation (on eliquis prior to  admission)  Presented with dizziness diplopia and nausea. Workup found 1.5 X 3 cm intraparenchymal hematoma centered in inferior cerebellar vermis with likely extension into fourth ventricle; no hydrocephalus. Etiology ruled likely hypertensive in setting of chronic anticoagulation after fall.    - follow up with stroke neurology 6-8 weeks from 12/27  --Had left hand weakness on 12/31 and stat head CT showed no new intracranial abnormalities  -Continue PT, OT, SLP  -Underwent neuropsych testing, awaiting final report.       #Chronic C7 compression fracture  #Mid thoracic Back pain  No dynamic instability of C7 fracture  -01/12, due to persistent back pain, scheduled tylenol 650mg TID with additional PRN available. Also scheduled lidocaine patches.         #Chronic diastolic CHF  #Atrial fibrillation PTA on Eliquis  #Hypertension  #Hyperlipidemia  Previously on apixaban, held after ICH. Anticoagulation was reversed and held. Echocardiogram with EF of 60 to 65%.  Severe biatrial enlargement.  Mild to moderate TR.  - lovenox ppx  - stroke neurology in 6-8 weeks  - BP goal systolic 130-150  - continue amlodipine  - continue carvedilol  - continue avapro  - hold PTA demadex  - follow up with cardiology in 1 month to discuss Watchman        #Diabetic ketoacidosis  #Diabetes mellitus type II HgbA1c 8.1% 12/12/23  Patient's insulin pump had been on hold since admission in the setting of IPH. Blood sugars labile during hospital stay. Patient went into DKA on 12/24/2023.  Likely triggered by infection with rise in the WBC count hence pump discontinued and patient switched to insulin drip per DKA protocol. DKA resolved on 12/25/2023, but then he had another episode of likely DKA on morning of 12/28/23 which may have been due to patient confusion about use of pump / injectable insulin  - Endo consulted and signed off. 1/02, restarted PTA insulin pump, but due to cognitive impairments to properly and safely administer insulin,  giving excess or too little insulin, he was taken off of the insulin pump and placed on basal lantus and carb coverage novolog. Currently on:  -  Lantus 7 unit(s) q24 hours at 1800 hrs  -  Novolog 1 unit(s) per 10 g cho breakfast, per primary service: 1:18 g cho lunch/dinner and snacks.  Cover all intake.                 -  Novolog custom 1/65 resistance TID AC/HS and 0200                  -  BG TID AC/HS and 0200     #Adjustment Disorder   Patient is feeling down and hopless with what he feels is little progression functionally. Reporting he is worried about his health status and what will happen to his wife if he is unable to care for her.   -Clinical Psychology consult placed to evaluate and treat  -1/16 Will start Remeron at night. This is to take advantage of the sedative effects to also help his sleep quality and quantity. QTC from December EKG is WNL for an adult male.         #Insomnia, improving  -1/08 continue remelteon and melatonin  -1/16, starting remeron for mood but will also help with sleep.         #Possible aspiration pneumonia.  Agitation, leukocytosis on 12/12 to 12/13. No growth blood cultures.  CXR R > L lower lung opacity.  Completed IV ceftriaxone - cefuroxime course.  - CXR in ~ 4 weeks (~1/27/24)  - Incentive spirometry  - aspiration precautions     #Onychomycosis  -Podiatry saw and debrided all 10 toenails. Will reconsult if any new concerns arise.      #Presumed sepsis likely due to UTI  Patient went into DKA with rising white count and mildly positive UA.  Of note, patient had just completed treatment for blood pneumonia UTI, question if this was not completely treated.  Urine culture with no growth. Started on vanc/zosyn and narrowed to ceftriaxone 12/27/23  - Completed 7 day total course of abx with ceftriaxone on 1/2/24        #CKD Stage 3  Baseline creatinine 1-1.1  - continue irbesartan  - consider readding torsemide prn for edema  - 1/15, patient has received multiple bolus for IV  hydration. Creatinine slightly improved to 1.38. Will give maintenance fluids overnight and repeat BMP on 1/17. Will keep PIV in place for now, anticipating future IV fluids in upcoming days.      #Anemia of chronic disease  - Continue to assess with Monday and Thursday labs.   -Hgb stable and increased to 9.7 from 8.8 on 1/08. Suspect partly due to hemoconcentration. Continue to Monitor CBC every monday     Adjustment to disability:  Clinical psychology consulted, to eval and treat  Bowel: PRN meds available  Bladder: Continent  DVT Prophylaxis: Lovenox  GI Prophylaxis: On regular diet, will assess for GI symptoms  Code: DNR/DNI  Disposition: Home vs. Alternate level of care.  ELOS:   Pending disposition and placement  Follow up Appointments on Discharge:   -Primary care provider, Jessika Cordoba in 1-2 weeks from discharge  Stroke neurology in 6-8 weeks from 12/27/23  cardiology in 1 month from 12/27/23  CXR in ~ 1/27/24  Diabetes follow up: Dr Asif at [facility] End[facility]         Seen and discussed with Dr. Paez, PM&R staff physician     El Escalera, DO  PGY2  Physical Medicine and Rehabilitation-[facility]  Pager: [phone]

## 2024-01-16 NOTE — PLAN OF CARE
Discharge Planner Post-Acute Rehab SLP:      Discharge Plan: TBD - [facility] for pt and spouse     Precautions: Fall, alarms     Current Status:  Hearing: WFL  Vision: Glasses  Communication: WFL  Cognition: WNL per CLQT. Moderate reasoning, mild attention and memory deficits during tasks.   Swallow: regular, thin (0) liquid. NO STRAWS      Assessment: Pt engaged in alternating attention task via SpotIT. Introduced to procedure and strategies. Pt able to locate and compare targets within visually loaded field with overall min cues 90% accuracy. Pt improved as task progressed and improved in processing speed with onset of time pressure.     Other Barriers to Discharge (Family Training, etc): family training: IADL assist

## 2024-01-16 NOTE — PROGRESS NOTES
"10 Meter Walk Test:  Setup - using open 10 meter walkway. Can be setup individually or using marks along base board of East hallway between PT and OT gyms.  Instructions - comfortable/self chosen pace: \"Walk at your own comfortable walking pace and stop when you reach the end,\"   and fast pace: \"Walk as fast as you can safely walk and stop when you reach the end.\"   Time was started as the lead foot crossed the 2 meter felicity and finished as the patient's lead foot crossed the 8 meter felicity.    Assistive Device: FWW  Assist Provided: [facility]    Average Speed for Comfortable Pace - 0.43 m/s   Average Speed for Fast Pace - 0.72 m/s    Assessment: Pt demonstrates improved comfortable gait speed within range for home ambulator. Pt remained safe with increased speed.       Normative Data:    Spinal Cord Injury -   MDC - 0.13 m/s (Fredy et al, 2008)  MCID - 0.06 m/s (Sherine et al, 2007)  0.13 m/s (Fredy et al, 2008)    Gait Speed for Community Dwellers - Melissa et al, 2005  Low Gait Speed - <0.7 m/sec  Mean Gait Speed - 0.7-1.0 m/sec  High Functioning Gait Speed - >1.1 m/sec     "

## 2024-01-17 NOTE — PLAN OF CARE
Discharge Planner Post-Acute Rehab PT:     Discharge Plan: TBD - pt and spouse in need of cares/FPC    Precautions: Alarms    Current Status:  Bed Mobility: Supervision  Transfer: CGA/min A FWW  Gait: -200' FWW  Stairs: CGA B rail  Balance: Able to stand w/o support - slight posterior lean, braces against objects w/ transfers  Fall Class completed:  1/6/24    Outcome Measures:   Ibarra 1/1 = 20/56  Ibarra 1/8 = 24/56  Ibarra 1/17 = 27/56    10MWT - Comfortable Pace  - .35 m/s on 1/8                - .42 m/s on 1/16    Assessment: Pt with increased fatigue and somnolence today, noting sleeping too well with med change over night and has felt sleepy all morning. Still able to tolerate walking and balance testing during session, but overall cut short due to being with other provider.    Other Barriers to Discharge (DME, Family Training, etc):   Caregiver role for his spouse  Cognition limiting home safety

## 2024-01-17 NOTE — PROGRESS NOTES
01/17/24 1030   Signing Clinician's Name / Credentials   Signing clinician's name / credentials Jamarcus Clemons DPT   Ibarra Balance Scale (JAMILAH SAMANIEGO, NIMCO S, KEARA BUCKNER, AGA BARR: MEASURING BALANCE IN THE ELDERLY: VALIDATION OF AN INSTRUMENT. CAN. J. PUB. HEALTH, JULY/AUGUST SUPPLEMENT 2:S7-11, 1992.)   Sit To Stand 3   Standing Unsupported 3   Sitting Unsupported 4   Stand to Sit 3   Transfers 2   Standing with Eyes Closed 3   Standing Unsupported, Feet Together 2   Reach Forward With Outstretched Arm 1   Retrieve Object From Floor 3   Turning to Look Behind 1   Turn 360 Degrees 0   Placing Alternate Foot on Stool (4-6 inches) 1   Unsupported Tandem Stand (Demonstrate to Subject) 1   One Leg Stand 0   Total Score (A score of 45 or less has been correlated with an increased risk of falls)   Total Score (out of 56) 27     Ibarra Balance Scale (BBS) Cutoff Scores for TBI Population:    The BBS is a measure of static and dynamic standing balance that has been validated in community dwelling elderly individuals and individuals who have Parkinson's Disease, MS, and those who are s/p CVA and TBI. The test is administered without an assistive device. Scores from the Ibarra are used to determine the probability of falling based on the patient's previous history of falls and their test performance.     Minimal Detectable Change = 6.5 according to Alessandro & Justiney 2008    Assessment (rationale for performing, application to patient s function & care plan): Ibarra Balance Scale performed to assess pt's current balance function and risk or falling, pt currently at increased risk for falling based on fall cutoff score of <45/56. According to MDC for pt's with TBI, pt currently meeting the MDC for improved balance based on initial Ibarra scoring of 20/56 on 1/1, then scoring 24/56 on 1/8 up to 27/56 on 1/17 for total improvement of 7 points over ~2 week point. Plan to continue to work on pt's overall balance function and safety.  Time spent to edu pt on scoring and falls risk.   (Minutes billed as physical performance test): 15

## 2024-01-17 NOTE — PROGRESS NOTES
BERTHA was able to speak with pt's son, Maxx, to discuss discharge recommendations and finances. BERTHA discussed a few home care agencies she was able to connect with that do have the staffing - Maxx shared that he will be able to help with finances/billing for these services. Requests emails for communication. Discussed spenddowns, difference between insurance covered home care vs private pay. Maxx reports pt and pt's wife do have assets to spenddown. BERTHA will request a home SW as well as connect with PCP CC, Ronda Enrique, to continue working with pt and his family on applying for MA once assets are spent down.     BERTAH spoke with Autumn of [facility] and provided additional information on the services recommended. Autumn will visit pt tomorrow, 1/18, at 12pm to complete intake. Autumn shared that staffing could be set up within 48 hours of this meeting. BERTHA met with pt and updated him on this appointment.    BERTHA sent a home care referral to the [facility] for PT/OT/SLP/RN/HA/SW. [facility] was able to accept. Contact information added to BERTHA SMITH will update [facility] with discharge date.    [email or web address]    JERE Escobedo  Post Acute Float   [facility]    Phone: [phone]  Fax: [phone]

## 2024-01-17 NOTE — PLAN OF CARE
Goal Outcome Evaluation:    Overall Patient Progress: no changeOverall Patient Progress: no change    Pt is alert but remains intermittently confused with time. Easily redirectable with reorientation. Inconsistent with using the call light for needs. Triggers the bed alarm and will attempt to get up to the bathroom. Does wait sitting on the eob for staff to put gb on and for pt to use the walker. A1 w/ the walker. Can be incontinent of bladder due to urgency. Either uses the urinal or the toilet. No bm this shift. BG at 2am is 198, parameters not met for Sliding scale insulin. Denies pain, sob, dizziness, n/v or any new weakness. Appears sleeping during hourly rounds. Bed alarm for safety. VSS. Continue poc.

## 2024-01-17 NOTE — PROGRESS NOTES
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 SBA with footwear seated   Bathing: Recommend tub bench-pt declined initial shower  Toileting: CGA with toilet transfer, and min A with toilet hygiene with walker.   IADLs: Unable to correctly setup 0/4 medications with med box. Anticipate assistance with med mgmt upon discharge.   Vision/Cognition: further assessment     Assessment: Completed ADL routine with walker to increase IND and safety. Pt requiring increased assistance with LBD tasks d/t lethargy. Pt increasingly lethargic and had increased BP.      -17 d/t increased lethargy and increased BP      Other Barriers to Discharge (DME, Family Training, etc): Pt reports having shower chair at home.   Family training prior to discharge

## 2024-01-17 NOTE — PROGRESS NOTES
[facility]   Acute Rehabilitation Unit    INTERVAL HISTORY  Notes and labs reviewed over past 24 hours. No acute overnight events reported.  Remeron started last night and patient slept well.  He is sleepy this AM as well.  Will monitor but very important to be on good sleep schedule.  Denies chest pain, shortness of breath, no fever or chills.  Continue to encourage fluids and up out of bed through out day.     ROS: 10 point ROS was assessed and was negative unless otherwise stated in HPI      Functional  PT:  Bed Mobility: Supervision  Transfer: CGA/min A FWW  Gait: -200' FWW  Stairs: CGA B rail  Balance: Able to stand w/o support - slight posterior lean, braces against objects w/ transfers  Fall Class completed:  1/6/24     Outcome Measures:   Ibarra 1/1 = 20/56  Ibarra 1/8 = 24/56     10MWT - Comfortable Pace  - .35 m/s on 1/8                                                 - .42 m/s on 1/16             MEDICATIONS  Scheduled meds    acetaminophen  650 mg Oral TID     amLODIPine  10 mg Oral Daily     carvedilol  12.5 mg Oral BID w/meals     enoxaparin ANTICOAGULANT  40 mg Subcutaneous Q24H     insulin aspart   Subcutaneous Daily with breakfast     insulin aspart   Subcutaneous Daily with lunch     insulin aspart   Subcutaneous Daily with supper     insulin aspart  1-7 Units Subcutaneous TID AC     insulin aspart  1-5 Units Subcutaneous BID     insulin glargine  7 Units Subcutaneous Q24H     irbesartan  300 mg Oral At Bedtime     isosorbide mononitrate  60 mg Oral QPM     lidocaine  1 patch Transdermal Q24h     mirtazapine  15 mg Oral At Bedtime     ramelteon  8 mg Oral At Bedtime     tamsulosin  0.4 mg Oral QAM       PRN meds:  acetaminophen, glucose **OR** dextrose **OR** glucagon, insulin aspart, menthol (Topical Analgesic) 2.5%, - MEDICATION INSTRUCTIONS -, senna-docusate      PHYSICAL EXAM  /72   Pulse 80   Temp 97.5  F (36.4  C) (Oral)   Resp 16   Ht 1.753  "m (5' 9\")   Wt 63 kg (139 lb)   SpO2 96%   BMI 20.53 kg/m    General: in no acute distress, conversational and alert, resting comfortably in bed - tired  HEENT: atraumatic, nares clear, conjunctiva white  Pulmonary: on room air, symmetrical chest rise  Cardiovascular: RRR, no murmur auscultated, well-perfused peripherally  Abdominal: soft, non-tender to palpation, non-distended  Extremities: warm peripherally, no edema in BLEs  Skin: warm, dry, without erythema, ecchymosis, or rash noted  MSK/Neuro: Moves all 4 extremities volitionally and against gravity. A&O X3. Able to listen to and follow commands appropriately. Speech coherent and comprehensible.       LABS  Results for orders placed or performed during the hospital encounter of 12/29/23 (from the past 24 hour(s))   Glucose by meter   Result Value Ref Range    GLUCOSE BY METER POCT 295 (H) 70 - 99 mg/dL   Glucose by meter   Result Value Ref Range    GLUCOSE BY METER POCT 282 (H) 70 - 99 mg/dL   Glucose by meter   Result Value Ref Range    GLUCOSE BY METER POCT 328 (H) 70 - 99 mg/dL   Glucose by meter   Result Value Ref Range    GLUCOSE BY METER POCT 270 (H) 70 - 99 mg/dL   Glucose by meter   Result Value Ref Range    GLUCOSE BY METER POCT 198 (H) 70 - 99 mg/dL   Basic metabolic panel   Result Value Ref Range    Sodium 140 135 - 145 mmol/L    Potassium 4.8 3.4 - 5.3 mmol/L    Chloride 105 98 - 107 mmol/L    Carbon Dioxide (CO2) 24 22 - 29 mmol/L    Anion Gap 11 7 - 15 mmol/L    Urea Nitrogen 22.8 8.0 - 23.0 mg/dL    Creatinine 1.48 (H) 0.67 - 1.17 mg/dL    GFR Estimate 46 (L) >60 mL/min/1.73m2    Calcium 9.0 8.8 - 10.2 mg/dL    Glucose 347 (H) 70 - 99 mg/dL   Glucose by meter   Result Value Ref Range    GLUCOSE BY METER POCT 336 (H) 70 - 99 mg/dL   Glucose by meter   Result Value Ref Range    GLUCOSE BY METER POCT 298 (H) 70 - 99 mg/dL       ASSESSMENT AND PLAN    Tc Garcia is a 83 year old right hand dominant male with a PMH of DM2 with DKA, paroxysmal " atrial fibrillation formerly on apixaban, hypertension, hyperlipidemia, pelural effusion, CKD stage 3, BPH, and ACD who sustained a left cerebellar intracranial hemorrhage from a fall on 12/11/23 with a possible left medial frontal lobe infarct on imaging.  His deficits include decreased visual acuity and mild left hemiparesis. He was admitted to acute inpatient rehabilitation on 12/27/23.     Impairment group code: 02.22 Traumatic, Closed Injury; Fall with resulting intracranial hemorrhage        1. PT, OT and SLP 60 minutes of each on a daily basis, in addition to rehab nursing and close management of physiatrist.       2. Impairment of ADL's: OT for 60 min daily to work on upper and lower body self care, dressing, toileting, bathing, energy conservation techniques with use of ADs as needed.      3. Impairment of mobility:  PT for 60 min daily to work on gait exercises, strengthening, endurance buildup, transfers with use of walker as needed.      4. Impairment of cognition/language/swallow:  SLP for 60 min daily for cognitive evaluation and treatment strategies for higher level cognitive deficits and memory impairment.      5. Rehab RN to administer medication, patient education on medication taking, VS monitoring, bowel regimen, glucose monitoring and wound care/surgical wound dressing changes and monitoring.            1. Medical Conditions     #Traumatic brain injury  #Intraparenchymal hematoma    #Mild hydrocephalus   #H/o spontaneous intracranial hemorrhage (1/2023)  paroxysmal atrial fibrillation (on eliquis prior to admission)  Presented with dizziness diplopia and nausea. Workup found 1.5 X 3 cm intraparenchymal hematoma centered in inferior cerebellar vermis with likely extension into fourth ventricle; no hydrocephalus. Etiology ruled likely hypertensive in setting of chronic anticoagulation after fall.    - follow up with stroke neurology 6-8 weeks from 12/27  --Had left hand weakness on 12/31 and stat  head CT showed no new intracranial abnormalities  -Continue PT, OT, SLP  -Underwent neuropsych testing, awaiting final report.       #Chronic C7 compression fracture  #Mid thoracic Back pain  No dynamic instability of C7 fracture  -01/12, due to persistent back pain, scheduled tylenol 650mg TID with additional PRN available. Also scheduled lidocaine patches.         #Chronic diastolic CHF  #Atrial fibrillation PTA on Eliquis  #Hypertension  #Hyperlipidemia  Previously on apixaban, held after ICH. Anticoagulation was reversed and held. Echocardiogram with EF of 60 to 65%.  Severe biatrial enlargement.  Mild to moderate TR.  - lovenox ppx  - stroke neurology in 6-8 weeks  - BP goal systolic 130-150  - continue amlodipine  - continue carvedilol  - continue avapro  - hold PTA demadex  - follow up with cardiology in 1 month to discuss Watchman        #Diabetic ketoacidosis  #Diabetes mellitus type II HgbA1c 8.1% 12/12/23  Patient's insulin pump had been on hold since admission in the setting of IPH. Blood sugars labile during hospital stay. Patient went into DKA on 12/24/2023.  Likely triggered by infection with rise in the WBC count hence pump discontinued and patient switched to insulin drip per DKA protocol. DKA resolved on 12/25/2023, but then he had another episode of likely DKA on morning of 12/28/23 which may have been due to patient confusion about use of pump / injectable insulin  - Endo consulted and signed off. 1/02, restarted PTA insulin pump, but due to cognitive impairments to properly and safely administer insulin, giving excess or too little insulin, he was taken off of the insulin pump and placed on basal lantus and carb coverage novolog. Currently on:  -  Lantus 7 unit(s) q24 hours at 1800 hrs  -  Novolog 1 unit(s) per 10 g cho breakfast, per primary service: 1:18 g cho lunch/dinner and snacks.  Cover all intake.                 -  Novolog custom 1/65 resistance TID AC/HS and 0200                  -  BG  TID AC/HS and 0200     #Adjustment Disorder   Patient is feeling down and hopless with what he feels is little progression functionally. Reporting he is worried about his health status and what will happen to his wife if he is unable to care for her.   -Clinical Psychology consult placed to evaluate and treat  -1/16 Will start Remeron at night. This is to take advantage of the sedative effects to also help his sleep quality and quantity. QTC from December EKG is WNL for an adult male.         #Insomnia, improving  -1/08 continue remelteon and melatonin  -1/16, starting remeron for mood but will also help with sleep.         #Possible aspiration pneumonia.  Agitation, leukocytosis on 12/12 to 12/13. No growth blood cultures.  CXR R > L lower lung opacity.  Completed IV ceftriaxone - cefuroxime course.  - CXR in ~ 4 weeks (~1/27/24)  - Incentive spirometry  - aspiration precautions     #Onychomycosis  -Podiatry saw and debrided all 10 toenails. Will reconsult if any new concerns arise.      #Presumed sepsis likely due to UTI  Patient went into DKA with rising white count and mildly positive UA.  Of note, patient had just completed treatment for blood pneumonia UTI, question if this was not completely treated.  Urine culture with no growth. Started on vanc/zosyn and narrowed to ceftriaxone 12/27/23  - Completed 7 day total course of abx with ceftriaxone on 1/2/24        #CKD Stage 3  Baseline creatinine 1-1.1  - continue irbesartan  - consider readding torsemide prn for edema  - 1/15, patient has received multiple bolus for IV hydration. Creatinine slightly improved to 1.38. Will give maintenance fluids overnight and repeat BMP on 1/17. Will keep PIV in place for now, anticipating future IV fluids in upcoming days.      #Anemia of chronic disease  - Continue to assess with Monday and Thursday labs.   -Hgb stable and increased to 9.7 from 8.8 on 1/08. Suspect partly due to hemoconcentration. Continue to Monitor CBC  every monday     2. Adjustment to disability:  Clinical psychology consulted, to eval and treat  3. Bowel: PRN meds available  4. Bladder: Continent  5. DVT Prophylaxis: Lovenox  6. GI Prophylaxis: On regular diet, will assess for GI symptoms  7. Code: DNR/DNI  8. Disposition: Home vs. Alternate level of care.  9. ELOS:   Pending disposition and placement  10. Follow up Appointments on Discharge:   1. -Primary care provider, Jessika Cordoba in 1-2 weeks from discharge  2. Stroke neurology in 6-8 weeks from 12/27/23  3. cardiology in 1 month from 12/27/23  4. CXR in ~ 1/27/24  5. Diabetes follow up: Dr Asif at [facility] clinic of Endocrinology         Charlie Paez,   Physical Medicine and Rehabilitation-[facility]        I spent a total of 25 minutes face to face and coordinating care of Tc Garcia. Over 50% of my time on the unit was spent counseling the patient and /or coordinating care regarding post fall with ICH.

## 2024-01-17 NOTE — PROGRESS NOTES
Patient AxOx4, forgetful this shift. on RA. VSS. Working with therapy this shift, see notes for details. Cont x2 this shift. Ax1 with walker. BS remains high this shift, low 300s/high 200s. Md notified. At this time, patient up in chair. Chair alarm on and call bell in reach.

## 2024-01-17 NOTE — PLAN OF CARE
Discharge Planner Post-Acute Rehab SLP:      Discharge Plan: TBD - [facility] for pt and spouse?     Precautions: Fall, alarms     Current Status:  Hearing: WFL  Vision: Glasses  Communication: WFL  Cognition: WNL per CLQT. Moderate reasoning, mild attention and memory deficits during tasks.   Swallow: regular, thin (0) liquid. NO STRAWS      Assessment:   A.M.: Instructed pt in moderate level deductive reasoning/planning task with furniture delivery schedule. Pt required max guided cues for which clues to look at to correctly place items on delivery schedule. Once pt directed to look at specific clue, pt determine correct stop number with no additional cues.     P.M.: Instructed pt in moderate level reasoning/deductive reasoning puzzle with talent act schedule, pt demo'd increased accuracy this session with less cues compared to similar task from this morning when pt more fatigued- pt required only mild guiding cues to complete with 100% accuracy. Pt reported bottom hurts, described as burning; rehab RN notified who arrived to assess pt's complaint.     Other Barriers to Discharge (Family Training, etc): family training: IADL assist

## 2024-01-17 NOTE — PLAN OF CARE
FOCUS/GOAL  Bowel management, Bladder management, Pain management, Mobility, Skin integrity, and Safety management    ASSESSMENT, INTERVENTIONS AND CONTINUING PLAN FOR GOAL:  Patient is alert and oriented can be forgetful. A-1 walker. Continent of b;;ladder.. Last BM 1/16. Regular/thin BG monitored. VS WDL no care concern at this time. Patient denied pain, headache, dizziness, CP or SOB. Call light is in reach alarm is on.  Goal Outcome Evaluation:

## 2024-01-18 NOTE — PROGRESS NOTES
Brief Diabetes Note:    Curbside consult by Rehab Team regarding patient's higher trending and labile glucoses with history of multiple DKA episodes. IDS signed off on patient 1/11/2024.    Assessment:  Type 1 Diabetes Mellitus who presented in DKA (resolved) with diabetes c/b nephropathy, neuropathy who is managed outpatient on Medtronic minimed amb pump and Lucho CGM. A1c 8.1% (less stringent control for elderly patients with sig medical complexities for safety). To go home on MDI given recent changes in cognition it was deemed patient unsafe to restart pump.   2.   Labile BG    Glucoses trending higher following meals (persistent 200s the past 2 days), fasting AM acceptable (171 mg/dL).         Recommendations:  - Continue Lantus 7 units daily at 1800  - Increase Novolog ICR at breakfast from 1:10 to 1:8 g CHO, increase Novolog ICR at lunch and dinner from 1:18 to 1:15 g CHO.   - Continue Novolog correction scale 1:65 >140 TID AC; 1:65 >200 HS & 0200  - Encourage patient and RN to cover all snacks/meals as soon as possible once patient finished eating or nearing end of meal.       Farida Charles PA-C  Inpatient Diabetes Service  Pager: [phone]  Available on Bringg    Inpatient Diabetes Service will not continue to follow patient.  Please call back with any questions, labile blood glucose readings, changes to steroid plan, changes to nutrition plan, or need up updated glycemic control recommendations (please allow for 24-48 hours for this).     To contact Endocrine Diabetes service:   From 7AM-5PM: page inpatient diabetes provider that is following the patient that day (see filed or incomplete progress notes/consult notes).  If uncertain of provider assignment: page job code [phone].  For questions or updates from 5PM-7AM: page the diabetes job code for on call fellow: [phone]

## 2024-01-18 NOTE — PROGRESS NOTES
Discharge Planner Post-Acute Rehab OT:      Discharge Plan: home with HC OT services with 24/7 hired in help     Precautions: falls, posterior leaning, impaired cognition     Current Status:  ADLs:  Mobility: CGA with walker, intermittent posterior leaning  Grooming: CGA standing with walker, set-up seated  Dressing: set-up with UBD seated, CGA with LBD tasks with walker. SBA with footwear seated   Bathing: Recommend tub bench-pt declined initial shower  Toileting: CGA with toilet transfer, and min A with toilet hygiene with walker.   IADLs: Unable to correctly setup 0/4 medications with med box. Anticipate assistance with med mgmt upon discharge.   Vision/Cognition: further assessment     Assessment: Completed partial ADL routine with walker. Patient speaking to HC agency upon approach and pt agreeable for writer to provide HC nurse information on level of assistance with ADLs/IADLs and functional mobility.    Plan is for pt to discharge home this weekend vs Monday with 24/7 hired in help.   Other Barriers to Discharge (DME, Family Training, etc): Pt reports having shower chair at home.

## 2024-01-18 NOTE — CONSULTS
SPIRITUAL HEALTH SERVICES Consult Note  [facility] [place] [place]    Follow-up visit with pt. Pt feels he is making good progress, is looking forward to eventual discharge, particularly to be able to provide support to his spouse. Prayer was welcomed. Will continue to follow.     Jon Barrera) Jeanne Mcneil M.Div., [facility]  Staff   Pager [phone]      * [facility] remains available 24/7 for emergent requests/referrals, either by having the switchboard page the on-call  or by entering an ASAP/STAT consult in Epic (this will also page the on-call ). Routine Epic consults receive an initial response within 24 hours.*

## 2024-01-18 NOTE — PROGRESS NOTES
CLINICAL NUTRITION SERVICES - REASSESSMENT NOTE     Nutrition Prescription    Malnutrition Status:    Severe malnutrition in the context of acute on chronic illness.     Recommendations already ordered by Registered Dietitian (RD):  - Ordered updated weight  - Continue snacks and supplements as ordered    Future/Additional Recommendations:  Monitor PO intake, snack/supplement use, labs, and weight trends      EVALUATION OF THE PROGRESS TOWARD GOALS   Diet: Moderate Consistent Carbohydrate  - Room service with assist    Snacks/supplements:  - 2 pkts saltine crackers + 2 slices cheddar cheese at 10 am  - Cottage cheese + peaches at 2 pm  - Glucerna with lunch and dinner    Intake: % per flowsheets over past week, 1 intake documented at 50%  Per [employer], pt ordering 3 meals/day from room service. Ordered 3-day average of 2103 kcal and 111 g protein.        NEW FINDINGS   Met with pt at bedside. Pt reports eating well. He consumes majority of what he orders. He continues to enjoy the food snacks between meals. He drinks every Glucerna that is delivered to his room. Continued to encourage adequate intakes of 3 meals + food snacks + Glucerna daily. Pt understanding and agreeable.     Weight:   Wt Readings from Last 20 Encounters:   01/18/24 67.4 kg (148 lb 9.6 oz)   12/28/23 62.6 kg (138 lb 0.1 oz)   12/27/23 62.6 kg (138 lb)   12/24/23 60.8 kg (134 lb 0.6 oz)   11/20/23 62.6 kg (138 lb)   11/16/23 60.8 kg (134 lb)   10/26/23 63.5 kg (140 lb)   10/14/23 60.8 kg (134 lb)   09/25/23 63.3 kg (139 lb 9.6 oz)   07/05/23 67.4 kg (148 lb 9.6 oz)   06/07/23 67.1 kg (148 lb)   05/26/23 75.5 kg (166 lb 8 oz)   05/02/23 63.6 kg (140 lb 3.2 oz)   03/07/23 69.3 kg (152 lb 12.8 oz)   02/17/23 69.9 kg (154 lb)   02/16/23 69.9 kg (154 lb)   02/13/23 69.9 kg (154 lb 1.6 oz)   02/11/23 72.1 kg (159 lb)   02/10/23 69.4 kg (153 lb)   02/09/23 69.5 kg (153 lb 3.2 oz)   No significant wt loss noted. Weight up 3.4 kg since admission.      Labs:   Cr: 1.48 (H)  Hemoglobin A1C: 8.1 (12/12)  Glucose POCT: 171-305 over 24 hours    Meds:  Novolog  Lantus, 7 units every 24 hours  Remeron    GI: last BM 1/18 per I/Os, loose    Renal: CKD stage 3  Cr elevated, pt has received multiple bolus for IV hydration. Plan to give 1L IVF this afternoon.     MALNUTRITION  % Intake: No decreased intake noted  % Weight Loss: None noted  Subcutaneous Fat Loss: Facial region: moderate  Muscle Loss: Temporal: severe and Facial & jaw region: moderate to severe  Fluid Accumulation/Edema: Mild  Malnutrition Diagnosis: Severe malnutrition in the context of acute on chronic illness.     Previous Goals   Patient to consume % of nutritionally adequate meal trays TID, or the equivalent with supplements/snacks.   Evaluation: Met    Previous Nutrition Diagnosis  Predicted inadequate nutrient intake (kcal/protein) related to previously poor appetite, now improved to baseline per pt report.   Evaluation: Modified    CURRENT NUTRITION DIAGNOSIS  Predicted inadequate nutrient intake (kcal/pro) related to potential for variation in intake and menu fatigue with prolonged LOS.    INTERVENTIONS  Implementation  Medical food supplement therapy - continue Glucerna BID  Modify composition of meals/snacks - continue food snacks BID    Goals  Patient to consume % of nutritionally adequate meal trays TID, or the equivalent with supplements/snacks.     Monitoring/Evaluation  Progress toward goals will be monitored and evaluated per protocol.     Naz Johnson RD, MAYE  ARU RD pager: [phone]  Weekend/Holiday RD pager: [phone]

## 2024-01-18 NOTE — PLAN OF CARE
Goal Outcome Evaluation:      Patient here Post Stroke, alert and oriented with some forgetfulness, able to make needs known  Slept most of the night  No complained of pain, headache, chest pain, N&V, no SOB  Continent of Bladder and Bowel, ambulates with walker to bathroom, had BM this shift  Safety rounding checked completed, 3 side rails UP, bed alarm ON, call light/bedside table within reach  Plan of care ongoing

## 2024-01-18 NOTE — PLAN OF CARE
Goal Outcome Evaluation: Progressing  Patient's /75 this am, 139/63 upon recheck. Asymptomatic.    Creatinine elevated. LR bolus started this afternoon.  Patient had someone from home health here interviewing him today.

## 2024-01-18 NOTE — PLAN OF CARE
Discharge Planner Post-Acute Rehab SLP:      Discharge Plan: TBD - [facility] for pt and spouse vs HH ?     Precautions: Fall, alarms     Current Status:  Hearing: WFL  Vision: Glasses  Communication: WFL  Cognition: WNL per CLQT. Moderate reasoning, mild attention and memory deficits during tasks.   Swallow: regular, thin (0) liquid. NO STRAWS      Assessment: Pt initially declined therapy session as he was too overwhelmed with wellbeing of spouse alone at home. Pt then with request to discuss with SW. SLP provided handoff. Upon return, SLP informed pt SW will be following up today. Pt then verbalized understanding and became agreeable to participate in therapy. Pt engaged in task, using visual to solve functional math ?s related to time. Pt required mod-max cues to attend to task and reason through information.   Other Barriers to Discharge (Family Training, etc): family training: IADL assist

## 2024-01-18 NOTE — PROGRESS NOTES
[facility]   Acute Rehabilitation Unit    INTERVAL HISTORY  Notes and labs reviewed over past 24 hours. No acute overnight events reported.  Remeron started last night and patient slept well.  He is sleepy this AM as well.  Will monitor but very important to be on good sleep schedule.  Denies chest pain, shortness of breath, no fever or chills.  Continue to encourage fluids and up out of bed through out day.     ROS: 10 point ROS was assessed and was negative unless otherwise stated in HPI      Functional:      With PT: 1/17  Current Status:  Bed Mobility: Supervision  Transfer: CGA/min A FWW  Gait: -200' FWW  Stairs: CGA B rail  Balance: Able to stand w/o support - slight posterior lean, braces against objects w/ transfers  Fall Class completed:  1/6/24     Outcome Measures:   Ibarra 1/1 = 20/56  Ibarra 1/8 = 24/56  Ibarra 1/17 = 27/56     10MWT - Comfortable Pace  - .35 m/s on 1/8                                                 - .42 m/s on 1/16     With OT: 1/17     Current Status:  ADLs:  Mobility: CGA with walker, intermittent posterior leaning  Grooming: CGA standing with walker, set-up seated  Dressing: set-up with UBD seated, CGA with LBD tasks with walker. SBA with footwear seated   Bathing: Recommend tub bench-pt declined initial shower  Toileting: CGA with toilet transfer, and min A with toilet hygiene with walker.   IADLs: Unable to correctly setup 0/4 medications with med box. Anticipate assistance with med mgmt upon discharge.   Vision/Cognition: further assessment        With SLP: 1/17  Current Status:  Hearing: WFL  Vision: Glasses  Communication: WFL  Cognition: WNL per CLQT. Moderate reasoning, mild attention and memory deficits during tasks.   Swallow: regular, thin (0) liquid. NO STRAWS       MEDICATIONS  Scheduled meds   acetaminophen  650 mg Oral TID    amLODIPine  10 mg Oral Daily    carvedilol  12.5 mg Oral BID w/meals    enoxaparin ANTICOAGULANT  40 mg  "Subcutaneous Q24H    insulin aspart   Subcutaneous Daily with breakfast    insulin aspart   Subcutaneous Daily with lunch    insulin aspart   Subcutaneous Daily with supper    insulin aspart  1-7 Units Subcutaneous TID AC    insulin aspart  1-5 Units Subcutaneous BID    insulin glargine  7 Units Subcutaneous Q24H    irbesartan  300 mg Oral At Bedtime    isosorbide mononitrate  60 mg Oral QPM    lidocaine  1 patch Transdermal Q24h    mirtazapine  15 mg Oral At Bedtime    ramelteon  8 mg Oral At Bedtime    tamsulosin  0.4 mg Oral QAM       PRN meds:  acetaminophen, glucose **OR** dextrose **OR** glucagon, insulin aspart, menthol (Topical Analgesic) 2.5%, - MEDICATION INSTRUCTIONS -, senna-docusate      PHYSICAL EXAM  /63 (BP Location: Left arm, Patient Position: Semi-Bain's, Cuff Size: Adult Regular)   Pulse 73   Temp 97.8  F (36.6  C) (Oral)   Resp 16   Ht 1.753 m (5' 9\")   Wt 63 kg (139 lb)   SpO2 100%   BMI 20.53 kg/m    General: in no acute distress, conversational and alert, resting comfortably in bed - tired  HEENT: atraumatic, nares clear, conjunctiva white, oral mucosa dry  Pulmonary: on room air, symmetrical chest rise  Cardiovascular: RRR, no murmur auscultated, well-perfused peripherally  Abdominal: soft, non-tender to palpation, non-distended  Extremities: warm peripherally, no edema in BLEs  Skin: warm, dry, without erythema, ecchymosis, or rash noted  MSK/Neuro: Moves all 4 extremities volitionally and against gravity. A&O X3. Able to listen to and follow commands appropriately. Speech coherent and comprehensible.       LABS  Results for orders placed or performed during the hospital encounter of 12/29/23 (from the past 24 hour(s))   Glucose by meter   Result Value Ref Range    GLUCOSE BY METER POCT 298 (H) 70 - 99 mg/dL   Glucose by meter   Result Value Ref Range    GLUCOSE BY METER POCT 305 (H) 70 - 99 mg/dL   Glucose by meter   Result Value Ref Range    GLUCOSE BY METER POCT 275 (H) 70 " - 99 mg/dL   Glucose by meter   Result Value Ref Range    GLUCOSE BY METER POCT 277 (H) 70 - 99 mg/dL   Glucose by meter   Result Value Ref Range    GLUCOSE BY METER POCT 172 (H) 70 - 99 mg/dL   Glucose by meter   Result Value Ref Range    GLUCOSE BY METER POCT 171 (H) 70 - 99 mg/dL       ASSESSMENT AND PLAN    Tc Garcia is a 83 year old right hand dominant male with a PMH of DM2 with DKA, paroxysmal atrial fibrillation formerly on apixaban, hypertension, hyperlipidemia, pelural effusion, CKD stage 3, BPH, and ACD who sustained a left cerebellar intracranial hemorrhage from a fall on 12/11/23 with a possible left medial frontal lobe infarct on imaging.  His deficits include decreased visual acuity and mild left hemiparesis. He was admitted to acute inpatient rehabilitation on 12/27/23.     Impairment group code: 02.22 Traumatic, Closed Injury; Fall with resulting intracranial hemorrhage        PT, OT and SLP 60 minutes of each on a daily basis, in addition to rehab nursing and close management of physiatrist.       Impairment of ADL's: OT for 60 min daily to work on upper and lower body self care, dressing, toileting, bathing, energy conservation techniques with use of ADs as needed.      Impairment of mobility:  PT for 60 min daily to work on gait exercises, strengthening, endurance buildup, transfers with use of walker as needed.      Impairment of cognition/language/swallow:  SLP for 60 min daily for cognitive evaluation and treatment strategies for higher level cognitive deficits and memory impairment.      Rehab RN to administer medication, patient education on medication taking, VS monitoring, bowel regimen, glucose monitoring and wound care/surgical wound dressing changes and monitoring.            Medical Conditions     #Traumatic brain injury  #Intraparenchymal hematoma    #Mild hydrocephalus   #H/o spontaneous intracranial hemorrhage (1/2023)  paroxysmal atrial fibrillation (on eliquis prior to  admission)  Presented with dizziness diplopia and nausea. Workup found 1.5 X 3 cm intraparenchymal hematoma centered in inferior cerebellar vermis with likely extension into fourth ventricle; no hydrocephalus. Etiology ruled likely hypertensive in setting of chronic anticoagulation after fall.    - follow up with stroke neurology 6-8 weeks from 12/27  --Had left hand weakness on 12/31 and stat head CT showed no new intracranial abnormalities  -Continue PT, OT, SLP  -Underwent neuropsych testing, awaiting final report.       #Chronic C7 compression fracture  #Mid thoracic Back pain  No dynamic instability of C7 fracture  -01/12, due to persistent back pain, scheduled tylenol 650mg TID with additional PRN available. Also scheduled lidocaine patches.         #Chronic diastolic CHF  #Atrial fibrillation PTA on Eliquis  #Hypertension  #Hyperlipidemia  Previously on apixaban, held after ICH. Anticoagulation was reversed and held. Echocardiogram with EF of 60 to 65%.  Severe biatrial enlargement.  Mild to moderate TR.  - lovenox ppx  - stroke neurology in 6-8 weeks  - BP goal systolic 130-150  - continue amlodipine  - continue carvedilol  - continue avapro  - hold PTA demadex  - follow up with cardiology in 1 month to discuss Watchman        #Diabetic ketoacidosis  #Diabetes mellitus type II HgbA1c 8.1% 12/12/23  Patient's insulin pump had been on hold since admission in the setting of IPH. Blood sugars labile during hospital stay. Patient went into DKA on 12/24/2023.  Likely triggered by infection with rise in the WBC count hence pump discontinued and patient switched to insulin drip per DKA protocol. DKA resolved on 12/25/2023, but then he had another episode of likely DKA on morning of 12/28/23 which may have been due to patient confusion about use of pump / injectable insulin  - Endo consulted and signed off. 1/02, restarted PTA insulin pump, but due to cognitive impairments to properly and safely administer insulin,  giving excess or too little insulin, he was taken off of the insulin pump and placed on basal lantus and carb coverage novolog. 1/18, due to elevated sugars over past 48 hours and difficult history of controlling sugars contacted endocrine for recommendations:   -  Lantus 7 unit(s) q24 hours at 1800 hrs  -  1/18 Increased Novolog to 1 unit(s) per 8 g cho breakfast, per primary service: 1:15 g cho lunch/dinner and snacks.  Cover all intake.                 -  Novolog custom 1/65 resistance TID AC/HS and 0200                  -  BG TID AC/HS and 0200  -1/18, due to elevated sugars over past 48 hours and difficult history of controlling sugars, will touch base with endocrine for recommendations of insulin regimen adjustment.      #Adjustment Disorder   Patient is feeling down and hopless with what he feels is little progression functionally. Reporting he is worried about his health status and what will happen to his wife if he is unable to care for her.   -Clinical Psychology consult placed to evaluate and treat  -1/16 started patient on Remeron. He reports he is sleeping much better at night and feels he is tolerating the medication well.         #Insomnia, improving  -1/08 continue remelteon and melatonin  -1/16, starting remeron for mood but will also help with sleep. 1/18, reports sleep has improved since starting Remeron.          #Possible aspiration pneumonia.  Agitation, leukocytosis on 12/12 to 12/13. No growth blood cultures.  CXR R > L lower lung opacity.  Completed IV ceftriaxone - cefuroxime course.  - CXR in ~ 4 weeks (~1/27/24)  - Incentive spirometry  - aspiration precautions     #Onychomycosis  -Podiatry saw and debrided all 10 toenails. Will reconsult if any new concerns arise.      #Presumed sepsis likely due to UTI  Patient went into DKA with rising white count and mildly positive UA.  Of note, patient had just completed treatment for blood pneumonia UTI, question if this was not completely treated.   Urine culture with no growth. Started on vanc/zosyn and narrowed to ceftriaxone 12/27/23  - Completed 7 day total course of abx with ceftriaxone on 1/2/24        #CKD Stage 3  Baseline creatinine 1-1.1  - continue irbesartan  - consider readding torsemide prn for edema  - 1/17, creatinine jumped again to 1.48. Will give IVF in afternoon at slow continuous rate for 1L.      #Anemia of chronic disease   -01/15 Hgb stable at 9.7  -- Continue to assess with weekly labs     Adjustment to disability:  Clinical psychology consulted, to eval and treat  Bowel: PRN meds available  Bladder: Continent  DVT Prophylaxis: Lovenox  GI Prophylaxis: On regular diet, will assess for GI symptoms  Code: DNR/DNI  Disposition: Home vs. Alternate level of care.  ELOS:   Pending disposition and placement  Follow up Appointments on Discharge:   -Primary care provider, Jessika Cordoba in 1-2 weeks from discharge  Stroke neurology in 6-8 weeks from 12/27/23  cardiology in 1 month from 12/27/23  CXR in ~ 1/27/24  Diabetes follow up: Dr Asif at \[facility]\"" clinic of Endocrinology         Seen and discussed with Dr. Paez, PM&R staff physician       [place], DO  PGY2  Physical Medicine and Rehabilitation-[facility]  Pager: [phone]

## 2024-01-18 NOTE — PLAN OF CARE
Goal Outcome Evaluation:       Overall Patient Progress: no changeOverall Patient Progress: no change    Outcome Evaluation: Alert and oriented thispmshift but forgetful of time and place. Continent of bladder, used the urinal and toilet as needed. Ate 100% of dinner. BS checked and insulin given per ordered parameters. Asked for snacksat bedtime, given cereal, Novolog for snacks given. Mepilex on buttocks applied as pt complained of soreness. Able to use his call light appropriately and waited for assistance.

## 2024-01-18 NOTE — PROGRESS NOTES
Autumn with [facility] visited pt this afternoon to complete intake and set up services. Autumn told BERTHA that she will be calling for staffing this afternoon, and could possibly have staffing lined up by this weekend. [facility] can also provide a discharge ride home if pt is able to transfer into a standard car. BERTHA shared pt's son's contact information for billing.    BERTHA met with pt in the afternoon after the visit - pt was very pleased with the visit and told BERTHA that Autumn is meeting with pt's wife this afternoon to introduce herself. BERTHA updated pt that once staffing is confirmed, a discharge date can be selected.    JERE Escobedo  Post Acute Float   [facility]    Phone: [phone]  Fax: [phone]

## 2024-01-19 NOTE — PLAN OF CARE
Goal Outcome Evaluation:         Patient here post Stroke, alert and oriented, with some forgetfulness, calls appropriately  Slept most of the night  No complained of pain, headache, chest pain, N&V, no SOB  Continent of Bladder and Bowel, ambulates with walker to bathroom, voiding well, had small BM this shift   LR 1L infused @ 100ml d/t elevated Creatinine completed this shift , Right PIV patent and saline locked.   BG checked @ 2AM with correction if above 200, no correction given   Safety rounding checked completed, 3 side rails UP, bed alarm ON, call light/bedside table within reach  Plan of care ongoing

## 2024-01-19 NOTE — PROGRESS NOTES
BERTHA received a call from [employer] at the beginning of the day, he received the estimated monthly cost for 24/7 support. [employer] would rather pt move into a higher rated SPENCER and spenddown until pt qualifies for MA. BERTHA explained that pt has not wanted to move forward with an care home due to pt's wife being against this. BERTHA told [employer]x a discharge plan needs to be confirmed as pt no longer needs to be at [facility]. [facility]x believes that if pt were to move into an SPENC[facility], pt's wife would eventually agree to move in as well. Maxx told SW he will focus on moving pt first and then work with his mother - he reports supervision services checking in on pt's wife were set up this week now that pt's daughter, Camille, is not providing care.     [employer] called pt to discuss moving into an care home to spend down assets before applying for MA rather than 24/7 in home support and explained the costs. Per [employer], pt agreed to move into an SPENCER.    BERTHA spoke with [facility] Director, about discharge barriers. A plan will need to be put together by Wednesday at the latest before pt would move into private pay.     BERTHA spoke with Sanna with [facility]. She will visit pt this weekend to help him select an care home with openings from a few choices pt's son can afford. Sanna sent the SPENCER referrals today. BERTHA explained that a confirmed discharge plan needs to be set up by early next week. Sanna voiced understanding and believes pt could move into one of the ALFs early next week once one accepts pt.     BERTHA received a voicemail from pt's wife concerned pt is not returning home. BERTHA also received a call from Heaven with Patient Relations letting BERTHA know that pt's brother-in-law called and is concerned why pt is still at [facility]. BERTHA provided Heaven with an update on the case. Pt and pt's son are aware of these calls.    BERTHA spoke with pt in the afternoon. Pt confirmed he is on board with the plan to move into an care home. BERTHA updated pt on Sanna visiting this weekend and for pt to move next week to one  of the facilities Landry will provide information on. BERTHA let pt know his son will work with pt's wife on moving her in with pt once pt admits to the SPENC[facility]. Pt agreed to this plan.    BERTHA called [facility] and cancelled open home care referral.    -------------------------------------------------  Landry Quezada  [facility] Owner, [facility]  PH: [phone]  [email or web address]    JERE Escobedo  Post Acute Float   [facility]    Phone: [phone]  Fax: [phone]

## 2024-01-19 NOTE — PLAN OF CARE
Discharge Planner Post-Acute Rehab SLP:      Discharge Plan: TBD - [facility] for pt and spouse vs HH ?     Precautions: Fall, alarms     Current Status:  Hearing: WFL  Vision: Glasses  Communication: WFL  Cognition: WNL per CLQT. Moderate reasoning, mild attention and memory deficits during tasks.   Swallow: regular, thin (0) liquid. NO STRAWS      Assessment: Pt participated in reasoning/planning task via 'Flow'. Introduced to procedure and strategies. Significant difficulty with recall of strategies and development of plan to solve problems. Severely impaired mental flexibility and difficulty with error recognition. Required mod cues to complete with 100% accuracy     Other Barriers to Discharge (Family Training, etc): family training: IADL assist

## 2024-01-19 NOTE — PROGRESS NOTES
[facility]   Acute Rehabilitation Unit    INTERVAL HISTORY  Patient seen this AM sitting up at edge of bed.  No acute events overnight.  Denies chest pain, shortness of breath, no fever or chills.  More alert today.  Reiterated recovery from ICH as well as getting home with support and not having to care for wife as he is recovering and needs help.  Will monitor sugars throughout the day and adjust regimen accordingly.  Continue to encourage PO fluid intake.     ROS: 10 point ROS was assessed and was negative unless otherwise stated in HPI      Functional:  PT:  Bed Mobility: Supervision  Transfer: CGA/ SBA FWW  Gait: CGA/-200' FWW  Stairs: CGASBA B rail  Balance: Able to stand w/o support - slight posterior lean, braces against objects w/ transfers  Fall Class completed:  1/6/24     Outcome Measures:   Ibarra 1/1 = 20/56  Ibarra 1/8 = 24/56  Ibarra 1/17 = 27/56     10MWT - Comfortable Pace  - .35 m/s on 1/8                                                 - .42 m/s on 1/16      MEDICATIONS  Scheduled meds   acetaminophen  650 mg Oral TID    amLODIPine  10 mg Oral Daily    carvedilol  12.5 mg Oral BID w/meals    enoxaparin ANTICOAGULANT  40 mg Subcutaneous Q24H    insulin aspart   Subcutaneous Daily with breakfast    insulin aspart   Subcutaneous Daily with lunch    insulin aspart   Subcutaneous Daily with supper    insulin aspart  1-7 Units Subcutaneous TID AC    insulin aspart  1-5 Units Subcutaneous BID    insulin glargine  7 Units Subcutaneous Q24H    irbesartan  300 mg Oral At Bedtime    isosorbide mononitrate  60 mg Oral QPM    lidocaine  1 patch Transdermal Q24h    mirtazapine  7.5 mg Oral At Bedtime    ramelteon  8 mg Oral At Bedtime    tamsulosin  0.4 mg Oral QAM       PRN meds:  acetaminophen, glucose **OR** dextrose **OR** glucagon, insulin aspart, menthol (Topical Analgesic) 2.5%, - MEDICATION INSTRUCTIONS -, senna-docusate      PHYSICAL EXAM  BP (!) 156/77 (BP  "Location: Left arm)   Pulse 63   Temp 97.7  F (36.5  C) (Oral)   Resp 16   Ht 1.753 m (5' 9\")   Wt 67.4 kg (148 lb 9.6 oz)   SpO2 94%   BMI 21.94 kg/m    General: in no acute distress, conversational and alert, up at edge of bed.   HEENT: atraumatic, nares clear, conjunctiva white, oral mucosa dry  Pulmonary: on room air, symmetrical chest rise  Cardiovascular: RRR, no murmur auscultated, well-perfused peripherally  Abdominal: soft, non-tender to palpation, non-distended  Extremities: warm peripherally, no edema in BLEs  Skin: warm, dry, without erythema, ecchymosis, or rash noted  MSK/Neuro: Moves all 4 extremities volitionally and against gravity. A&O X3. Able to listen to and follow commands appropriately. Speech coherent and comprehensible.       LABS  Results for orders placed or performed during the hospital encounter of 12/29/23 (from the past 24 hour(s))   Glucose by meter   Result Value Ref Range    GLUCOSE BY METER POCT 188 (H) 70 - 99 mg/dL   Glucose by meter   Result Value Ref Range    GLUCOSE BY METER POCT 194 (H) 70 - 99 mg/dL   Glucose by meter   Result Value Ref Range    GLUCOSE BY METER POCT 369 (H) 70 - 99 mg/dL   Glucose by meter   Result Value Ref Range    GLUCOSE BY METER POCT 304 (H) 70 - 99 mg/dL   Glucose by meter   Result Value Ref Range    GLUCOSE BY METER POCT 183 (H) 70 - 99 mg/dL   Basic metabolic panel   Result Value Ref Range    Sodium 139 135 - 145 mmol/L    Potassium 4.1 3.4 - 5.3 mmol/L    Chloride 106 98 - 107 mmol/L    Carbon Dioxide (CO2) 28 22 - 29 mmol/L    Anion Gap 5 (L) 7 - 15 mmol/L    Urea Nitrogen 24.8 (H) 8.0 - 23.0 mg/dL    Creatinine 1.31 (H) 0.67 - 1.17 mg/dL    GFR Estimate 54 (L) >60 mL/min/1.73m2    Calcium 8.8 8.8 - 10.2 mg/dL    Glucose 199 (H) 70 - 99 mg/dL   Extra Tube    Narrative    The following orders were created for panel order Extra Tube.  Procedure                               Abnormality         Status                     ---------              "                  -----------         ------                     Extra Purple Top Tube[[id number]]                            Final result                 Please view results for these tests on the individual orders.   Extra Purple Top Tube   Result Value Ref Range    Hold Specimen JIC    Glucose by meter   Result Value Ref Range    GLUCOSE BY METER POCT 204 (H) 70 - 99 mg/dL   Glucose by meter   Result Value Ref Range    GLUCOSE BY METER POCT 253 (H) 70 - 99 mg/dL       ASSESSMENT AND PLAN    Tc Garcia is a 83 year old right hand dominant male with a PMH of DM2 with DKA, paroxysmal atrial fibrillation formerly on apixaban, hypertension, hyperlipidemia, pelural effusion, CKD stage 3, BPH, and ACD who sustained a left cerebellar intracranial hemorrhage from a fall on 12/11/23 with a possible left medial frontal lobe infarct on imaging.  His deficits include decreased visual acuity and mild left hemiparesis. He was admitted to acute inpatient rehabilitation on 12/27/23.     Impairment group code: 02.22 Traumatic, Closed Injury; Fall with resulting intracranial hemorrhage        PT, OT and SLP 60 minutes of each on a daily basis, in addition to rehab nursing and close management of physiatrist.       Impairment of ADL's: OT for 60 min daily to work on upper and lower body self care, dressing, toileting, bathing, energy conservation techniques with use of ADs as needed.      Impairment of mobility:  PT for 60 min daily to work on gait exercises, strengthening, endurance buildup, transfers with use of walker as needed.      Impairment of cognition/language/swallow:  SLP for 60 min daily for cognitive evaluation and treatment strategies for higher level cognitive deficits and memory impairment.      Rehab RN to administer medication, patient education on medication taking, VS monitoring, bowel regimen, glucose monitoring and wound care/surgical wound dressing changes and monitoring.            Medical Conditions      #Traumatic brain injury  #Intraparenchymal hematoma    #Mild hydrocephalus   #H/o spontaneous intracranial hemorrhage (1/2023)  paroxysmal atrial fibrillation (on eliquis prior to admission)  Presented with dizziness diplopia and nausea. Workup found 1.5 X 3 cm intraparenchymal hematoma centered in inferior cerebellar vermis with likely extension into fourth ventricle; no hydrocephalus. Etiology ruled likely hypertensive in setting of chronic anticoagulation after fall.    - follow up with stroke neurology 6-8 weeks from 12/27  --Had left hand weakness on 12/31 and stat head CT showed no new intracranial abnormalities  -Continue PT, OT, SLP  -Underwent neuropsych testing, awaiting final report.       #Chronic C7 compression fracture  #Mid thoracic Back pain  No dynamic instability of C7 fracture  -01/12, due to persistent back pain, scheduled tylenol 650mg TID with additional PRN available. Also scheduled lidocaine patches.         #Chronic diastolic CHF  #Atrial fibrillation PTA on Eliquis  #Hypertension  #Hyperlipidemia  Previously on apixaban, held after ICH. Anticoagulation was reversed and held. Echocardiogram with EF of 60 to 65%.  Severe biatrial enlargement.  Mild to moderate TR.  - lovenox ppx  - stroke neurology in 6-8 weeks  - BP goal systolic 130-150  - continue amlodipine  - continue carvedilol  - continue avapro  - hold PTA demadex  - follow up with cardiology in 1 month to discuss Watchman        #Diabetic ketoacidosis  #Diabetes mellitus type II HgbA1c 8.1% 12/12/23  Patient's insulin pump had been on hold since admission in the setting of IPH. Blood sugars labile during hospital stay. Patient went into DKA on 12/24/2023.  Likely triggered by infection with rise in the WBC count hence pump discontinued and patient switched to insulin drip per DKA protocol. DKA resolved on 12/25/2023, but then he had another episode of likely DKA on morning of 12/28/23 which may have been due to patient confusion  about use of pump / injectable insulin  - Endo consulted and signed off. 1/02, restarted PTA insulin pump, but due to cognitive impairments to properly and safely administer insulin, giving excess or too little insulin, he was taken off of the insulin pump and placed on basal lantus and carb coverage novolog. 1/18, due to elevated sugars over past 48 hours and difficult history of controlling sugars contacted endocrine for recommendations:   -  Lantus 7 unit(s) q24 hours at 1800 hrs  -  1/18 Increased Novolog to 1 unit(s) per 8 g cho breakfast, per primary service: 1:15 g cho lunch/dinner and snacks.  Cover all intake.                 -  Novolog custom 1/65 resistance TID AC/HS and 0200                  -  BG TID AC/HS and 0200  -1/18, due to elevated sugars over past 48 hours and difficult history of controlling sugars, will touch base with endocrine for recommendations of insulin regimen adjustment.      #Adjustment Disorder   Patient is feeling down and hopless with what he feels is little progression functionally. Reporting he is worried about his health status and what will happen to his wife if he is unable to care for her.   -Clinical Psychology consult placed to evaluate and treat  -1/16 started patient on Remeron. He reports he is sleeping much better at night and feels he is tolerating the medication well.         #Insomnia, improving  -1/08 continue remelteon and melatonin  -1/16, starting remeron for mood but will also help with sleep. 1/18, reports sleep has improved since starting Remeron.          #Possible aspiration pneumonia.  Agitation, leukocytosis on 12/12 to 12/13. No growth blood cultures.  CXR R > L lower lung opacity.  Completed IV ceftriaxone - cefuroxime course.  - CXR in ~ 4 weeks (~1/27/24)  - Incentive spirometry  - aspiration precautions     #Onychomycosis  -Podiatry saw and debrided all 10 toenails. Will reconsult if any new concerns arise.      #Presumed sepsis likely due to  UTI  Patient went into DKA with rising white count and mildly positive UA.  Of note, patient had just completed treatment for blood pneumonia UTI, question if this was not completely treated.  Urine culture with no growth. Started on vanc/zosyn and narrowed to ceftriaxone 12/27/23  - Completed 7 day total course of abx with ceftriaxone on 1/2/24        #CKD Stage 3  Baseline creatinine 1-1.1  - continue irbesartan  - consider readding torsemide prn for edema  - 1/17, creatinine jumped again to 1.48. Given IVF in afternoon at slow continuous rate for 1L improved today to 1.31     #Anemia of chronic disease   -01/15 Hgb stable at 9.7  -- Continue to assess with weekly labs     Adjustment to disability:  Clinical psychology consulted, to eval and treat  Bowel: PRN meds available  Bladder: Continent  DVT Prophylaxis: Lovenox  GI Prophylaxis: On regular diet, will assess for GI symptoms  Code: DNR/DNI  Disposition: Home vs. Alternate level of care.  ELOS:   Pending disposition and placement  Follow up Appointments on Discharge:   -Primary care provider, Jessika Cordoba in 1-2 weeks from discharge  Stroke neurology in 6-8 weeks from 12/27/23  cardiology in 1 month from 12/27/23  CXR in ~ 1/27/24  Diabetes follow up: Dr Asif at [facility] clinic of Endocrinology           Charlie Paez DO  Physical Medicine and Rehabilitation-[facility]        I spent a total of 25 minutes face to face and coordinating care of Tc Garcia. Over 50% of my time on the unit was spent counseling the patient and /or coordinating care regarding post ICH 2/2 fall.

## 2024-01-19 NOTE — PLAN OF CARE
Goal Outcome Evaluation:      Plan of Care Reviewed With: patient    Overall Patient Progress: no change    Outcome Evaluation: No change in patient progress.    Orientation: A/Ox4  Bowel: No BM on this shift  Bladder: Continent of bladder using toilet in bathroom  Pain: Denies pain  Ambulation/Transfers: CGA with walker for transfers and ambulation  Blood sugars: See results tab for details. PM&R notified regarding elevated BGs  Diet/ Liquids: Tolerating diet with good appetite  Tubes/ Lines/ Drains:  PIV to RUE removed d/t leakage this shift  Skin: Unremarkable   Bed alarm on for safety, call light within reach. Continue with POC.

## 2024-01-19 NOTE — PROGRESS NOTES
"   Discharge Planner Post-Acute Rehab OT:      Discharge Plan: home with HC OT services with 24/7 hired in help     Precautions: falls, posterior leaning, impaired cognition     Current Status:  ADLs:  Mobility: CGA with walker, intermittent posterior leaning  Grooming: CGA -SBA standing with walker  Dressing: set-up with UBD seated, CGA with LBD tasks with walker. SBA with footwear seated   Bathing: Recommend tub bench-pt declined initial shower  Toileting: CGA with toilet transfer, and min A with toilet hygiene with walker.   IADLs: Unable to correctly setup 0/4 medications with med box. Anticipate assistance with med mgmt upon discharge.   Vision/Cognition: further assessment     Assessment:  tx initiated w/partial ADL routine tasks, pt CGA-SBA w/fww ambulate to sink, completed g/h standing at sink SBA. Fatigued after and requested short rest duration to lie down. Next, faciltated trialing functional tsfs at various heights. Pt demo'd able to stand from 17\" surface without arm rests increasing to CGA w/repetition of technique/positioning. Pt CGA FWW longer hallway distances, fatigues and benefits from rest breaks. Reviewed navigation of narrow spaces including side stepping w/fww option, pt return demonstrated safely w/CGA.     Other Barriers to Discharge (DME, Family Training, etc): Pt reports having shower chair at home.           "

## 2024-01-19 NOTE — PLAN OF CARE
Discharge Planner Post-Acute Rehab PT:     Discharge Plan: TBD - pt and spouse in need of cares/[facility]    Precautions: Alarms    Current Status:  Bed Mobility: Supervision  Transfer: CGA/ SBA FWW  Gait: CGA/-200' FWW  Stairs: CGASBA B rail  Balance: Able to stand w/o support - slight posterior lean, braces against objects w/ transfers  Fall Class completed:  1/6/24    Outcome Measures:   Ibarra 1/1 = 20/56  Ibarra 1/8 = 24/56  Ibarra 1/17 = 27/56    10MWT - Comfortable Pace  - .35 m/s on 1/8                - .42 m/s on 1/16    Assessment: Focusing on improvement with postural control and normalizing gait pattern with decreasing reliance on hand support; needs frequent seated rest/recovery; demonstrates improved stability with transfers using FWW in complex environments.    Other Barriers to Discharge (DME, Family Training, etc):   Caregiver role for his spouse  Cognition limiting home safety

## 2024-01-19 NOTE — PLAN OF CARE
Goal Outcome Evaluation:       Overall Patient Progress: no changeOverall Patient Progress: no change    Outcome Evaluation: Denies pain the whole pm shift. Continent of bowel and bladder, used the toilet with assistance of 1 and walker. IV infusing well at 100ml/hr. Ate well for dinner, insulin given per ordered parameters. Able to make his needs known and waited for assistance.

## 2024-01-20 NOTE — PLAN OF CARE
Discharge Planner Post-Acute Rehab PT:     Discharge Plan: TBD - pt and spouse in need of cares/long term    Precautions: Alarms    Current Status:  Bed Mobility: Supervision  Transfer: CGA/ SBA FWW  Gait: CGA/-200' FWW  Stairs: CGASBA B rail  Balance: Able to stand w/o support - slight posterior lean, braces against objects w/ transfers  Fall Class completed:  1/6/24    Outcome Measures:   Ibarra 1/1 = 20/56  Ibarra 1/8 = 24/56  Ibarra 1/17 = 27/56    10MWT - Comfortable Pace  - .35 m/s on 1/8                - .42 m/s on 1/16    Assessment: Close SBA for the majority of this session with the RW.  Noted improvement with balance when reducing gait speed.  Lightly bumped into objects on his L side frequently throughout the session, cues did help but unable to really carryover.      Other Barriers to Discharge (DME, Family Training, etc):   Caregiver role for his spouse  Cognition limiting home safety

## 2024-01-20 NOTE — PLAN OF CARE
Goal Outcome Evaluation:      Plan of Care Reviewed With: patient    Overall Patient Progress: improvingOverall Patient Progress: improving    Alert and oriented x 4, denies headache or dizziness,denies sob or chest pain. BG's and sliding scale coverage completed. Appetite is good, use the call light appropriately

## 2024-01-20 NOTE — PLAN OF CARE
Discharge Planner Post-Acute Rehab SLP:      Discharge Plan: TBD - [facility] for pt and spouse vs HH ?     Precautions: Fall, alarms     Current Status:  Hearing: WFL  Vision: Glasses  Communication: WFL  Cognition: WNL per CLQT. Moderate reasoning, mild attention and memory deficits during tasks.   Swallow: regular, thin (0) liquid. NO STRAWS      Assessment: Pt recalls this therapist from previous weekend. Pt performed an information processing/mental manipulation task to address attention x 40% accuracy, improving to 100% with moderate cues.     Other Barriers to Discharge (Family Training, etc): family training: IADL assist

## 2024-01-20 NOTE — PLAN OF CARE
Goal Outcome Evaluation:       Overall Patient Progress: no changeOverall Patient Progress: no change    Outcome Evaluation: Denies pain the whole shift. BS checked and insulin given per ordered parameters. Continent of bladder, used the toilet with assist of 1 walker and gait belt. Had shower tonight with minimal assistance. Able to make his needs known, used his call light appropriately and wiated for assistance.

## 2024-01-20 NOTE — PLAN OF CARE
Goal Outcome Evaluation:    Overall Patient Progress: no change    Outcome Evaluation: No change in pt progress this shift    Pt is A/O x4. No impulsive behavior overnight, able to make needs known. Denied pain, SOB, chest pain, or new symptoms. Continent B/B, LBM 1/19. Transfers A1 CGA with walker. Overnight BG was 56, pt reported feeling hungry but otherwise asymptomatic. Glucose gel given with marlyn crackers and milk. Follow-up BG after 15 minutes was 63. Second glucose gel given with peanut butter and jelly sandwich and apple juice. Follow-up BG after 15 minutes was 116. After BG stabilized, 8 units insulin aspart given per order for snack carb coverage. Pt appeared asleep during safety rounds. Call light in reach, bed alarm on. Continue plan of care.

## 2024-01-21 NOTE — PLAN OF CARE
Discharge Planner Post-Acute Rehab SLP:      Discharge Plan: TBD - [facility] for pt and spouse vs HH ?     Precautions: Fall, alarms     Current Status:  Hearing: WFL  Vision: Glasses  Communication: WFL  Cognition: WNL per CLQT. Moderate reasoning, mild attention and memory deficits during tasks.   Swallow: regular, thin (0) liquid. NO STRAWS      Assessment: Pt performed functional math tasks associated with common tasks x 60% accuracy independently, improving to 100% with moderate cues. Pts processing is significantly slower this date.     Other Barriers to Discharge (Family Training, etc): family training: IADL assist

## 2024-01-21 NOTE — PROGRESS NOTES
[facility], [place]   Physical Medicine and Rehabilitation Note  Weekend/Holiday coverage           Assessment and Plan of Care:   Tc Garcia is a 83 year old right hand dominant male with a PMH of DM2 with DKA, paroxysmal atrial fibrillation formerly on apixaban, hypertension, hyperlipidemia, pelural effusion, CKD stage 3, BPH, and ACD who sustained a left cerebellar intracranial hemorrhage from a fall on 12/11/23 with a possible left medial frontal lobe infarct on imaging.  His deficits include decreased visual acuity and mild left hemiparesis. He was admitted to acute inpatient rehabilitation on 12/27/23.     --No changes today.  --Vitals stable. Creatine stable at 1.38 today but still above baseline. Will give small bolus of IVF this afternoon.   --Continue ongoing medical management.  --Continue therapies and plan of care.  --Continue to work with social work on safe disposition  --Due to labile sugars, adjusted mealtime correction   --1:14g of carbs with supper   --1:13g of carbs with Lunch   --Keep 1:8 of carbs with breakfast.          Interval history:     No events overnight. Denies CP, abdominal pain, or leg pain. Reports slept well last night. Therapies are going well. Reports eating and drinking well.  He continues to be distressed about care for his wife at home. He discussed needing to leave today. Per last discussion with Social Work, Home care services are to be set up this week, possibly as early as tomorrow, 1/22. Discussed with patient and he is in agreement to stay as we finalize discharge plan. Discussed the concerns our team has from both a medical and functional status with             Physical Exam:     Vitals:    01/20/24 1529 01/20/24 1758 01/20/24 2331 01/21/24 0729   BP: 131/58 (!) 150/74  (!) 157/76   BP Location: Left arm Left arm  Left arm   Patient Position:  Semi-Bain's     Cuff Size:  Adult Regular     Pulse: 60 74  63   Resp: 17   18   Temp:     97.5  F (36.4  C)   TempSrc:    Oral   SpO2:    95%   Weight:   65 kg (143 lb 4.8 oz)    Height:         Gen: NAD, resting in bed  Heart: RRR, no murmurs auscultated  Lungs: clear breath sounds b/l  Abd: soft and non-tender, bowel sounds auscultated  Ext: wwp, no edema in BLE, non-tender to palpation over bilateral calves  MSK/neuro: alert and oriented, speech fluent, moves BUE and BLE volitionally         Data:   Scheduled meds   acetaminophen  650 mg Oral TID    amLODIPine  10 mg Oral Daily    carvedilol  12.5 mg Oral BID w/meals    enoxaparin ANTICOAGULANT  40 mg Subcutaneous Q24H    insulin aspart   Subcutaneous Daily with breakfast    insulin aspart   Subcutaneous Daily with lunch    insulin aspart   Subcutaneous Daily with supper    insulin aspart  1-7 Units Subcutaneous TID AC    insulin aspart  1-5 Units Subcutaneous BID    insulin glargine  7 Units Subcutaneous Q24H    irbesartan  300 mg Oral At Bedtime    isosorbide mononitrate  60 mg Oral QPM    lidocaine  1 patch Transdermal Q24h    mirtazapine  7.5 mg Oral At Bedtime    ramelteon  8 mg Oral At Bedtime    tamsulosin  0.4 mg Oral QAM       PRN meds:  acetaminophen, glucose **OR** dextrose **OR** glucagon, insulin aspart, menthol (Topical Analgesic) 2.5%, - MEDICATION INSTRUCTIONS -, senna-docusate    CBC RESULTS:   Recent Labs   Lab Test 01/15/24  0802   WBC 9.1   RBC 3.55*   HGB 9.7*   HCT 32.0*   MCV 90   MCH 27.3   MCHC 30.3*   RDW 17.9*            Last Comprehensive Metabolic Panel:  Lab Results   Component Value Date     01/21/2024    POTASSIUM 4.2 01/21/2024    CHLORIDE 108 (H) 01/21/2024    CO2 26 01/21/2024    ANIONGAP 8 01/21/2024     (H) 01/21/2024    BUN 23.2 (H) 01/21/2024    CR 1.36 (H) 01/21/2024    GFRESTIMATED 51 (L) 01/21/2024    LLOYD 8.9 01/21/2024       Seen and discussed with Dr. Paez, PM&R staff physician       [place], DO  PGY2  Physical Medicine and Rehabilitation-[facility]  Pager:  [phone]

## 2024-01-21 NOTE — PLAN OF CARE
Goal Outcome Evaluation:    Overall Patient Progress: no change    Outcome Evaluation: No change in pt progress this shift    Pt is A/O x4. No impulsive behavior overnight, able to make needs known. Denied pain, SOB, chest pain, or new symptoms. Continent B/B, LBM 1/20. Transfers A1 CGA with walker. Overnight BG was 157. Correction insulin held per parameters. Pt requested a snack, 3 units insulin given for carb coverage per order. Pt reported difficulty sleeping during the night. Call light in reach, bed alarm on. Continue plan of care.

## 2024-01-21 NOTE — PLAN OF CARE
Goal Outcome Evaluation:             Outcome Evaluation: pt alert and oriented with some confusion. Assist x1 with walker. Continent of both and uses urinal appropriately. LBM 1/20. Denied pain throughout the shift.  Continue with POC.

## 2024-01-21 NOTE — PROGRESS NOTES
Was informed by Resident MD this morning that pt is requesting to leave today. This writer is not primary and not as involved in pt care. Reached out to Denita Avery primary SW. Note from end of last week pending and would be completed today with updated information. See Denita's note from 01/19/24 for additional information. Primary team not here on the weekend. Unsure of status and safety of discharge. Resident spoke with pt who is agreeable to stay the evening. Primary SW/this writer to follow-up.     KENJI Castorena   [facility]   Direct Phone: [phone]  I   Pager: [phone]  I  Fax: [phone]

## 2024-01-21 NOTE — PLAN OF CARE
"Goal Outcome Evaluation:         Overall Patient Progress: no change       VS:  BP (!) 150/74 (BP Location: Left arm, Patient Position: Semi-Bain's, Cuff Size: Adult Regular)   Pulse 74   Temp 97.6  F (36.4  C) (Oral)   Resp 17   Ht 1.753 m (5' 9\")   Wt 65 kg (143 lb 4.8 oz)   SpO2 92%   BMI 21.16 kg/m      O2:  Stable on RA; denied SOB   Output:  Continent & uses urinal @ bedside   Last BM:  1/20   Activity:  A1 w/ GB & walker   Skin:  Pls see flowsheet for details   Pain:  Pt denied any pain including chest pain   CMS:  A&O x4; denied new numbness or tingling   Dressing:  N/A   Diet:  Consistent Carb (60 g CHO) w/ thin; denied N/V   LDA:  N/A   Equipment:  Personal belongings   Plan:  Cont POC   Additional Info:           "

## 2024-01-22 NOTE — PROGRESS NOTES
Discharge Planner Post-Acute Rehab OT:      Discharge Plan: home with HC OT services with 24/7 hired in help vs shelter     Precautions: falls, posterior leaning, impaired cognition     Current Status:  ADLs:  Mobility: CGA with walker, intermittent posterior leaning  Grooming: CGA -SBA standing with walker, set-up seated  Dressing: set-up with UBD seated, CGA with LBD tasks with walker. SBA with footwear seated   Bathing: Recommend tub bench-pt declined initial shower  Toileting: CGA with toilet transfer, and min A with toilet hygiene with walker.   IADLs: Unable to correctly setup 0/4 medications with med box. Anticipate assistance with med mgmt upon discharge.   Vision/Cognition: further assessment     Assessment:  Completed ADL routine with walker to increase IND and safety. Focused on g/h tasks to build functional standing tolerance.   -15 d/t breakfast      Other Barriers to Discharge (DME, Family Training, etc): Pt reports having shower chair at home. Family training prior to discharge

## 2024-01-22 NOTE — PLAN OF CARE
Discharge Planner Post-Acute Rehab PT:     Discharge Plan: TBD - pt and spouse in need of cares/custodial    Precautions: Alarms    Current Status:  Bed Mobility: Supervision  Transfer: CGA/ SBA FWW  Gait: CGA/-200' FWW  Stairs: CGASBA B rail  Balance: Able to stand w/o support - slight posterior lean, braces against objects w/ transfers  Fall Class completed:  1/6/24    Outcome Measures:   Ibarra 1/1 = 20/56  Ibarra 1/8 = 24/56  Ibarra 1/17 = 27/56    10MWT - Comfortable Pace  - .35 m/s on 1/8                - .42 m/s on 1/16    Assessment: Close SBA for the majority of this session with the RW.  Noted improvement with balance when reducing gait speed.  Lightly bumped into objects on his L side frequently throughout the session, cues did help but unable to really carryover.      Other Barriers to Discharge (DME, Family Training, etc):   Caregiver role for his spouse  Cognition limiting home safety

## 2024-01-22 NOTE — PLAN OF CARE
"Goal Outcome Evaluation:    Overall Patient Progress: no change    Outcome Evaluation: No change in pt progress this shift    Pt is alert and oriented to self only. Woke up x3 throughout night believing it was 0700 and wanting to get ready for day. He did not know where he was or why he was here when asked. Stated during a conversation that he was \"going to visit his mom,\" asked when his mom was coming to visit, stated his mother is 83 years old. Set off alarm trying to get out of bed x1. Able to make needs known. Denied pain, SOB, chest pain, or new symptoms. Continent B/B, LBM 1/21. Transfers A1 CGA with walker. Overnight BG was 130. Correction insulin held per parameters. Pt awake intermittently throughout the night. Call light in reach, bed alarm on. Continue plan of care.   "

## 2024-01-22 NOTE — PLAN OF CARE
Goal Outcome Evaluation:      Plan of Care Reviewed With: patient    Overall Patient Progress: no change    Outcome Evaluation: no change, patient wanting and hoping to go home soon.    Orientation: A/O x3, forgetful.VSS on RA.    Bowel:continent to the toilet. LBM 1/21.    Bladder:continent to the toilet.   Pain:no c/o pain. Lidocaine patch placed to lower back at HS.    Ambulation/Transfers:up with CG gait belt and walker to the bathroom.    Blood sugars:before dinner 125 and .  Carb coverage and sliding scale used.    Diet/ Liquids:Regular thin, pills whole.   Skin: blanchable redness to coccyx. Sacral mepilex for protection.    Bed alarm on for safety, call light within reach. Continue with POC.

## 2024-01-22 NOTE — PLAN OF CARE
Discharge Planner Post-Acute Rehab PT:     Discharge Plan: TBD - pt and spouse in need of cares/[facility]    Precautions: Alarms    Current Status:  Bed Mobility: Supervision  Transfer: CGA/ SBA FWW  Gait: CGA/-200' FWW  Stairs: CGASBA B rail  Balance: Able to stand w/o support - slight posterior lean, braces against objects w/ transfers  Fall Class completed:  1/6/24    Outcome Measures:   Ibarra 1/1 = 20/56  Ibarra 1/8 = 24/56  Ibarra 1/17 = 27/56    10MWT - Comfortable Pace  - .35 m/s on 1/8                - .42 m/s on 1/16    Assessment: pt cont to progress with all functional mob, today. Demo standing reaching task with WW all with good support.   Other Barriers to Discharge (DME, Family Training, etc):   Caregiver role for his spouse  Cognition limiting home safety

## 2024-01-22 NOTE — PROGRESS NOTES
[facility]   Acute Rehabilitation Unit    INTERVAL HISTORY  Notes and labs reviewed over past 24 hours. No acute overnight events reported    Patient was seen and examined. He reports that although he is dissapointed in that fact he isn't discharging today or tomorrow, he understands that it ultimately is not the safest thing for him until his disposition, which has multiple complex factors, is finalized and safe. He has no acute physcial concerns or complaints. Sugars remain stable. Nursing reported increased restlessness and lack of sleep overnight.     ROS: 10 point ROS was assessed and was negative unless otherwise stated in HPI      Functionally,    With PT: 1/22    Current Status:  Bed Mobility: Supervision  Transfer: CGA/ SBA FWW  Gait: CGA/-200' FWW  Stairs: CGASBA B rail  Balance: Able to stand w/o support - slight posterior lean, braces against objects w/ transfers  Fall Class completed:  1/6/24     Outcome Measures:   Ibarra 1/1 = 20/56  Ibarra 1/8 = 24/56  Ibarra 1/17 = 27/56    With OT: 1/22    Current Status:  ADLs:  Mobility: CGA with walker, intermittent posterior leaning  Grooming: CGA -SBA standing with walker, set-up seated  Dressing: set-up with UBD seated, CGA with LBD tasks with walker. SBA with footwear seated   Bathing: Recommend tub bench-pt declined initial shower  Toileting: CGA with toilet transfer, and min A with toilet hygiene with walker.   IADLs: Unable to correctly setup 0/4 medications with med box. Anticipate assistance with med mgmt upon discharge.   Vision/Cognition: further assessment    With SLP: 1/22    Current Status:  Hearing: WFL  Vision: Glasses  Communication: WFL  Cognition: WNL per CLQT. Moderate reasoning, mild attention and memory deficits during tasks.   Swallow: regular, thin (0) liquid. NO STRAWS           MEDICATIONS  Scheduled meds   acetaminophen  650 mg Oral TID    amLODIPine  10 mg Oral Daily    carvedilol  12.5 mg Oral  "BID w/meals    enoxaparin ANTICOAGULANT  40 mg Subcutaneous Q24H    insulin aspart   Subcutaneous Daily with breakfast    insulin aspart   Subcutaneous Daily with lunch    insulin aspart   Subcutaneous Daily with supper    insulin aspart  1-7 Units Subcutaneous TID AC    insulin aspart  1-5 Units Subcutaneous BID    insulin glargine  7 Units Subcutaneous Q24H    irbesartan  300 mg Oral At Bedtime    isosorbide mononitrate  60 mg Oral QPM    lidocaine  1 patch Transdermal Q24h    mirtazapine  15 mg Oral At Bedtime    ramelteon  8 mg Oral At Bedtime    tamsulosin  0.4 mg Oral QAM       PRN meds:  acetaminophen, glucose **OR** dextrose **OR** glucagon, insulin aspart, menthol (Topical Analgesic) 2.5%, - MEDICATION INSTRUCTIONS -, senna-docusate      PHYSICAL EXAM  /64 (BP Location: Left arm, Patient Position: Supine)   Pulse 61   Temp 97.9  F (36.6  C) (Oral)   Resp 16   Ht 1.753 m (5' 9\")   Wt 65 kg (143 lb 4.8 oz)   SpO2 94%   BMI 21.16 kg/m    General: in no acute distress, conversational and alert, up at edge of bed.   HEENT: atraumatic, nares clear, conjunctiva white, oral mucosa dry  Pulmonary: on room air, symmetrical chest rise  Cardiovascular: RRR, no murmur auscultated, well-perfused peripherally  Abdominal: soft, non-tender to palpation, non-distended  Extremities: warm peripherally, no edema in BLEs  Skin: warm, dry, without erythema, ecchymosis, or rash noted  MSK/Neuro: Moves all 4 extremities volitionally and against gravity. A&O X3. Able to listen to and follow commands appropriately. Speech coherent and comprehensible      LABS  Results for orders placed or performed during the hospital encounter of 12/29/23 (from the past 24 hour(s))   Glucose by meter   Result Value Ref Range    GLUCOSE BY METER POCT 125 (H) 70 - 99 mg/dL   Glucose by meter   Result Value Ref Range    GLUCOSE BY METER POCT 220 (H) 70 - 99 mg/dL   Glucose by meter   Result Value Ref Range    GLUCOSE BY METER POCT 130 (H) " 70 - 99 mg/dL   CBC with Platelets & Differential    Narrative    The following orders were created for panel order CBC with Platelets & Differential.  Procedure                               Abnormality         Status                     ---------                               -----------         ------                     CBC with platelets and d...[[id number]]  Abnormal            Final result                 Please view results for these tests on the individual orders.   Basic metabolic panel   Result Value Ref Range    Sodium 142 135 - 145 mmol/L    Potassium 4.0 3.4 - 5.3 mmol/L    Chloride 107 98 - 107 mmol/L    Carbon Dioxide (CO2) 28 22 - 29 mmol/L    Anion Gap 7 7 - 15 mmol/L    Urea Nitrogen 24.0 (H) 8.0 - 23.0 mg/dL    Creatinine 1.42 (H) 0.67 - 1.17 mg/dL    GFR Estimate 49 (L) >60 mL/min/1.73m2    Calcium 8.8 8.8 - 10.2 mg/dL    Glucose 109 (H) 70 - 99 mg/dL   CBC with platelets and differential   Result Value Ref Range    WBC Count 6.9 4.0 - 11.0 10e3/uL    RBC Count 3.14 (L) 4.40 - 5.90 10e6/uL    Hemoglobin 8.8 (L) 13.3 - 17.7 g/dL    Hematocrit 28.1 (L) 40.0 - 53.0 %    MCV 90 78 - 100 fL    MCH 28.0 26.5 - 33.0 pg    MCHC 31.3 (L) 31.5 - 36.5 g/dL    RDW 18.7 (H) 10.0 - 15.0 %    Platelet Count 217 150 - 450 10e3/uL    % Neutrophils 67 %    % Lymphocytes 13 %    % Monocytes 10 %    % Eosinophils 8 %    % Basophils 1 %    % Immature Granulocytes 1 %    NRBCs per 100 WBC 0 <1 /100    Absolute Neutrophils 4.6 1.6 - 8.3 10e3/uL    Absolute Lymphocytes 0.9 0.8 - 5.3 10e3/uL    Absolute Monocytes 0.7 0.0 - 1.3 10e3/uL    Absolute Eosinophils 0.5 0.0 - 0.7 10e3/uL    Absolute Basophils 0.1 0.0 - 0.2 10e3/uL    Absolute Immature Granulocytes 0.1 <=0.4 10e3/uL    Absolute NRBCs 0.0 10e3/uL   Glucose by meter   Result Value Ref Range    GLUCOSE BY METER POCT 112 (H) 70 - 99 mg/dL   Glucose by meter   Result Value Ref Range    GLUCOSE BY METER POCT 151 (H) 70 - 99 mg/dL       ASSESSMENT AND PLAN    Tc F  Jose is a 83 year old right hand dominant male with a PMH of DM2 with DKA, paroxysmal atrial fibrillation formerly on apixaban, hypertension, hyperlipidemia, pelural effusion, CKD stage 3, BPH, and ACD who sustained a left cerebellar intracranial hemorrhage from a fall on 12/11/23 with a possible left medial frontal lobe infarct on imaging.  His deficits include decreased visual acuity and mild left hemiparesis. He was admitted to acute inpatient rehabilitation on 12/27/23.     Impairment group code: 02.22 Traumatic, Closed Injury; Fall with resulting intracranial hemorrhage        PT, OT and SLP 60 minutes of each on a daily basis, in addition to rehab nursing and close management of physiatrist.       Impairment of ADL's: OT for 60 min daily to work on upper and lower body self care, dressing, toileting, bathing, energy conservation techniques with use of ADs as needed.      Impairment of mobility:  PT for 60 min daily to work on gait exercises, strengthening, endurance buildup, transfers with use of walker as needed.      Impairment of cognition/language/swallow:  SLP for 60 min daily for cognitive evaluation and treatment strategies for higher level cognitive deficits and memory impairment.      Rehab RN to administer medication, patient education on medication taking, VS monitoring, bowel regimen, glucose monitoring and wound care/surgical wound dressing changes and monitoring.            Medical Conditions     #Traumatic brain injury  #Intraparenchymal hematoma    #Mild hydrocephalus   #H/o spontaneous intracranial hemorrhage (1/2023)  paroxysmal atrial fibrillation (on eliquis prior to admission)  Presented with dizziness diplopia and nausea. Workup found 1.5 X 3 cm intraparenchymal hematoma centered in inferior cerebellar vermis with likely extension into fourth ventricle; no hydrocephalus. Etiology ruled likely hypertensive in setting of chronic anticoagulation after fall.    - follow up with stroke  neurology 6-8 weeks from 12/27  --Had left hand weakness on 12/31 and stat head CT showed no new intracranial abnormalities  -Continue PT, OT, SLP  -Underwent neuropsych testing, awaiting final report.       #Chronic C7 compression fracture  #Mid thoracic Back pain  No dynamic instability of C7 fracture  -01/12, due to persistent back pain, scheduled tylenol 650mg TID with additional PRN available. Also scheduled lidocaine patches.         #Chronic diastolic CHF  #Atrial fibrillation PTA on Eliquis  #Hypertension  #Hyperlipidemia  Previously on apixaban, held after ICH. Anticoagulation was reversed and held. Echocardiogram with EF of 60 to 65%.  Severe biatrial enlargement.  Mild to moderate TR.  - lovenox ppx  - stroke neurology in 6-8 weeks  - BP goal systolic 130-150  - continue amlodipine  - continue carvedilol  - continue avapro  - hold PTA demadex  - follow up with cardiology in 1 month to discuss Watchman        #Diabetic ketoacidosis, resolved  #Diabetes mellitus type II HgbA1c 8.1% 12/12/23  Patient's insulin pump had been on hold since admission in the setting of IPH. Blood sugars labile during hospital stay. Patient went into DKA on 12/24/2023.  Likely triggered by infection with rise in the WBC count hence pump discontinued and patient switched to insulin drip per DKA protocol. DKA resolved on 12/25/2023, but then he had another episode of likely DKA on morning of 12/28/23 which may have been due to patient confusion about use of pump / injectable insulin  - Endo consulted and signed off. 1/02, restarted PTA insulin pump, but due to cognitive impairments to properly and safely administer insulin, giving excess or too little insulin, he was taken off of the insulin pump and placed on basal lantus and carb coverage novolog.   Insulin Regimen  -  Lantus 7 unit(s) q24 hours at 1800 hrs  -  1/18 Increased Novolog to 1 unit(s) per 8 g cho breakfast, per primary service: 1:13 g cho for lunch  1:14 for dinner a   Cover all intake.                 -  Novolog custom 1/65 resistance TID AC/HS and 0200                  -  BG TID AC/HS and 0200       #Adjustment Disorder   Patient is feeling down and hopless with what he feels is little progression functionally. Reporting he is worried about his health status and what will happen to his wife if he is unable to care for her.   -Clinical Psychology consult placed to evaluate and treat  -1/16 started patient on Remeron. He reports he is sleeping much better at night and feels he is tolerating the medication well.         #Insomnia, improving  -1/08 continue remelteon and melatonin  -1/16, starting remeron for mood but will also help with sleep. 1/18, reports sleep has improved since starting Remeron.          #Possible aspiration pneumonia.  Agitation, leukocytosis on 12/12 to 12/13. No growth blood cultures.  CXR R > L lower lung opacity.  Completed IV ceftriaxone - cefuroxime course.  - CXR in ~ 4 weeks (~1/27/24)  - Incentive spirometry  - aspiration precautions     #Onychomycosis  -Podiatry saw and debrided all 10 toenails. Will reconsult if any new concerns arise.      #Presumed sepsis likely due to UTI  Patient went into DKA with rising white count and mildly positive UA.  Of note, patient had just completed treatment for blood pneumonia UTI, question if this was not completely treated.  Urine culture with no growth. Started on vanc/zosyn and narrowed to ceftriaxone 12/27/23  - Completed 7 day total course of abx with ceftriaxone on 1/2/24        #CKD Stage 3  Baseline creatinine 1-1.1  - continue irbesartan  - consider readding torsemide prn for edema  - Creatinine 1.42, will give IVF bolus this evening.   -Continue to assess with Monday Thursday labs     #Anemia of chronic disease   -01/22 Hgb stable at 8.6  -- Continue to assess with weekly labs     Adjustment to disability:  Clinical psychology consulted, to eval and treat  Bowel: PRN meds available  Bladder:  Continent  DVT Prophylaxis: Lovenox  GI Prophylaxis: On regular diet, will assess for GI symptoms  Code: DNR/DNI  Disposition: Home vs. Alternate level of care.  ELOS:   Pending disposition and placement  Follow up Appointments on Discharge:   Primary care provider, Jessika Cordoba in 1-2 weeks from discharge  Stroke neurology in 6-8 weeks from 12/27/23  cardiology in 1 month from 12/27/23  CXR in ~ 1/27/24  Diabetes follow up: Dr Asif at [facility] clinic of Endocrinology       Seen and discussed with Dr. Paez, PM&R staff physician     [place],   PGY2  Physical Medicine and Rehabilitation-[facility]  Pager: [phone]

## 2024-01-22 NOTE — PLAN OF CARE
Goal Outcome Evaluation:             Outcome Evaluation: pt alert and oriented with some confusion. Assist x1 with walker. Continent of both and uses urinal appropriately. LBM 1/21. Denied pain throughout the shift.  Continue with POC.

## 2024-01-22 NOTE — PLAN OF CARE
Discharge Planner Post-Acute Rehab SLP:      Discharge Plan: TBD - [facility] for pt and spouse vs HH ?     Precautions: Fall, alarms     Current Status:  Hearing: WFL  Vision: Glasses  Communication: WFL  Cognition: WNL per CLQT. Moderate reasoning, mild attention and memory deficits during tasks.   Swallow: regular, thin (0) liquid. NO STRAWS      Assessment: Pt engaged in numerical reasoning with emphasis on processing speed with minimal cueing. Problem solving, with moderate cueing, and attention tasks via Lumosity on iPad. During problem solving task, pt made many of the same errors. Strategies were implemented into second trial which improve performance. Pt expressed that these strategies are helping. Task switching task was attempted, but pt did not understand switching of orientation and symbol.      Other Barriers to Discharge (Family Training, etc): family training: IADL assist

## 2024-01-22 NOTE — PROGRESS NOTES
Covering for LEV Escobedo.     Multiple and lengthy conversations with IDT on ARU, pt son Maxx (over the phone), Sanna (with [employer], elder care advisor), Autumn ([facility], private [email or web address]), and patient.     Ultimately, pt is ready for discharge. Recommendation has been home with 24/7 support vs alternative living that is supportive and can meet pt needed. A lot of communication between team, pt, and family about the plan. Barriers and factors to consider are the following: pt is his own decision-maker, pt wife has dementia and not agreeable to moving, family support and assistance (both when at home, coordinating cares, and physical assist), finances, mobility and prior falls at home, and ability to manage IADLs.     This morning, pt was eager to discharge home. Per Charge RN, pt wife was calling the desk multiple times this morning. As the day went on and per the medical team, pt expressed understanding of the need to stay and not leave without a safe plan in place.     Plan by the end of the day:     --Autumn with private pay company is going to come up with a caregiver schedule that isn't necessarily 24/7 but a schedule that will help provide AM, afternoon, and PM check-in support for pt and pt wife and provide the financial information for both pt family and pt to review, agree upon, and coordinate/put into place. Per Autumn, the caregiver agency will want to do some RN education with bedside RN and can plan to take pt home on the day of discharge. Per Autumn, the RN can not actually administer the medications but can help remind, direct, educate, and supervise pt at home. Per Autumn, some caregivers are already scheduled/confirmed and Autumn will help to facilitate and update this writer on when all is in place. Autumn is going to recommend that the family purchase a bed for the main level of the home, since she does not feel that the pt will be safe to use  "the stair lift at home. Autumn stated that she thinks if the pt has a urinal, that would be helpful to reduce having to get out of bed at night. Per RN notes, pt is continent and using the urinal appropriately on his own.     --Sanna the elder care advisor will continue to work with pt and pt family RE: spending down his finances, getting on MA and elderly waiver, paying for private pay services, and coordinating and moving into [facility]/higher level of care with his wife (who family feels will be agreeable if/when pt goes and also acknowledging that pt wife doesn't have an option). Sanna visited with pt over the weekend, showed pt some options for retirement placement, and has established a trusting relationship with pt and pt family to help coordinate long-term plans.     --Maxx will help facilitate short-term and long-term care needs. Maxx stated that his siblings are helping to plan financially and cares for pt wife. Considering the 14 hour time difference, Maxx is very attentive and communicative. Maxx stated around 3:30pm that it was 5:30am his time and would be getting some rest and plan to follow-up further tomorrow, 01/23/24.     BERTHA went to meet with pt at the end of the day. At the same time of BERTHA entering the room, Sanna was calling pt. SW put Sanna on speaker with pt permission. SW introduced self to pt. Discussed the conversations that have taken place today, reviewed the suggested short and long term plans, and SW/team continued support in coordinating a safe plan for pt. Pt expressed feeling \"overwhelmed with gratitude\" and in agreement with the plan. Pt notified that SW would connect with his son Sanna Lilly, and Autumn more tomorrow and continue to update him daily. Suggested that pt complete a HCD/POA if he is agreeable. Pt expressed understanding and agreement with plan and denied additional needs, questions, or concerns.     SW called [facility] at the end of the day to update. Spoke with " Rody PH: [phone].  Rody stated that the HC referral was cancelled on 01/19/24 but that she can re-open the case and will continue to follow. Made a plan to call Rody and update once more information has been determined. Will update again once more information determined.     Received an email from ARU admissions. Pt last day covered is Thurs 01/25/24 with a discharge on Fri 01/26/24. Pt, pt son, Autumn, and Landry all updated.     SW will remain available and continue to follow.   ---------------------------------------------------------------------------------------------------  Pt diana Lilly (lives in [facility], 14 hour time difference):  PH: [phone] (skype call)  E: [email or web address]     Pt dtr's (also on the chain emails between pt diana Lilly, Landry, & Autumn):  Brittani Sandy, E: [email or web address]   Camille Ji, E: [email or web address]    Home Health Care:   [facility]   PH: [phone]  Fax: [phone]  RN, PT, OT, SLP, SALOMON, and SW   Rody PH: [phone]    [employer]:  Landry Quezada  PH: [phone]  E: [email or web address]    [facility] (Private Pay):   Autumn Office: [phone] or Cell: [phone]  E: [email or web address]    VA Report: [id number] (Made by LEV Escobedo)  Adult protection , Yael PH: [phone]    [facility]   Direct Phone: [phone]  I   Pager: [phone]  I  Fax: [phone]

## 2024-01-23 NOTE — PLAN OF CARE
Goal Outcome Evaluation:  alert and oriented with some confusion. Assist x1 with walker. Continent of both and uses urinal appropriately. LBM 1/21. Denied pain throughout the shift.  Continue with POC.  Provider wants patient to get some teaching on how to administer Lovenox to himself since he will be discharging home with the medication. Writer went through with patient this afternoon  how to administer the medication to himself. Pt responded that he already know how to do that because he used to administer insulin before he got the insulin pump. Writer then reminded patient that was different medication and not insulin. Nursing will continue with the teaching before discharge.

## 2024-01-23 NOTE — CONSULTS
Psychiatry Consultation; Follow up              Reason for Consult, requesting source:    Capacity   Requesting source: Charlie Paez    Labs and imaging reviewed, seen by OMAR Perez CNP. Discussed with PM&R team and neuropsychologist's     Total time spent in chart review, patient interview and coordination of care; 60 minutes - all time was spent on the date of the encounter that I saw patient             Interim history:    Tc Garcia is a 83 year old right hand dominant male with a PMH of DM2 with DKA, paroxysmal atrial fibrillation formerly on apixaban, hypertension, hyperlipidemia, pelural effusion, CKD stage 3, BPH, and ACD who sustained a left cerebellar intracranial hemorrhage from a fall on 12/11/23 with a possible left medial frontal lobe infarct on imaging.  His deficits include decreased visual acuity and mild left hemiparesis. He was admitted to acute inpatient rehabilitation on 12/27/23.      During his stay, he has voiced sxs of adjustment disorder related to increasing stress of functional state, caring for wife, and finances. He has had difficulty sleeping, rozerem and remeron have been helping.     Neuropyschologist completed testing 1/3/24. It is recommended that Tc and his wife yessicaon to assisted living and that he has assistance in medication management upon discharge. It is interesting to note that patient had the cognitive ability and demonstrated accurate reading and interpretation of his blood sugar. Speech therapy noted that speech/language and other cognitive performances were normal across age norms.     Upon psychiatric assessment, patient was alert and oreinted x4 and pleasant. He smiled upon approach and reported he was bummed about still being in the hospital, but voiced his understanding of wanting to have a good discharge plan. He tells me that he still doesn't sleep well, can fall asleep fine but wakes up in the middle of the night. He is well aware  "of threatening to leave AMA and reports that this happened largely out of frustration, that he was waiting for hours to be helped by staff in the evening and was sick of being in the hospital.         Current Medications:      acetaminophen  650 mg Oral TID    amLODIPine  10 mg Oral Daily    carvedilol  12.5 mg Oral BID w/meals    enoxaparin ANTICOAGULANT  40 mg Subcutaneous Q24H    insulin aspart   Subcutaneous Daily with breakfast    insulin aspart   Subcutaneous Daily with lunch    insulin aspart   Subcutaneous Daily with supper    insulin aspart  1-7 Units Subcutaneous TID AC    insulin aspart  1-5 Units Subcutaneous BID    insulin glargine  7 Units Subcutaneous Q24H    irbesartan  300 mg Oral At Bedtime    isosorbide mononitrate  60 mg Oral QPM    lidocaine  1 patch Transdermal Q24h    mirtazapine  15 mg Oral At Bedtime    ramelteon  8 mg Oral At Bedtime    tamsulosin  0.4 mg Oral QAM              MSE:   Appearance: awake, alert and adequately groomed  Attitude:  cooperative  Eye Contact:  good  Mood:  better  Affect:  mood congruent  Speech:  clear, coherent  Psychomotor Behavior:  no evidence of tardive dyskinesia, dystonia, or tics  Muscle strength and tone: baseline   Thought Process:  logical, linear, and goal oriented  Associations:  no loose associations  Thought Content:  no evidence of suicidal ideation or homicidal ideation and no evidence of psychotic thought  Insight:  partial  Judgement:  fair  Oriented to:  time, person, and place  Attention Span and Concentration:  intact  Recent and Remote Memory:  intact    Vital signs:  Temp: 97.7  F (36.5  C) Temp src: Oral BP: (!) 159/79 Pulse: 70   Resp: 16 SpO2: 93 % O2 Device: None (Room air) Oxygen Delivery: 2 LPM Height: 175.3 cm (5' 9\") Weight: 65 kg (143 lb 4.8 oz)  Estimated body mass index is 21.16 kg/m  as calculated from the following:    Height as of this encounter: 1.753 m (5' 9\").    Weight as of this encounter: 65 kg (143 lb 4.8 oz).    Qtc: " 445 on 12/28/23         DSM-5 Diagnosis:   Adjustment disorder, with mixed features           Assessment:   Tc is an 83 year old male currently admitted to [facility] s/p brain hemorrge. He has been started on rozerem and remeron while here which has improved his sleep and mood. I would continue these upon discharge. Psych consulted today for discharge decision making capacity.     Of note, he did threaten to leave AMA recently but reports this was largely out of frustration. He is currently agreeable to staying until the treatment team is in agreement with discharge plans. He also appropriately and insightfully mentions that he does feel pressure to return to his wife with dementia given her ongoing calls and questions. Given the anatomical location of his injury, his overall cognition tested in the average range however he does struggle to carry out tasks given his impaired procedural skills related to cerebellar damage, but his higher reasoning and awareness of these deficits is present--which is key.      It is important to note that capacity is the ability of a person to make a specific decision at a given time. It is possible that one has capacity for a straight forward decision, but lacks capacity to make a complex medical decision. Capacity may wax and wane given a patiens fluctuating medical/mental status.     Aid to Capacity  Able to understand medical problem? Yes -- accurately stated diagnoses, events, and treatments received   2. Able to understand proposed treatment? Yes    3. Able to understand alternative to proposed treatment (if any)? Yes    4. Able to understand option of refusing proposed treatment? Yes    5. Able to appreciate reasonably foreseeable consequences of accepting proposed treatment? Yes   6. Able to appreciate reasonably foreseeable consequences of refusing proposed treatment?  Yes   7. The person s decision is affected by depression? No   8. The person s decision is affected by  delusion/psychosis? No   9. The person's decision is affected by dementia? NO, neurocognitive testing and interviews do not reflect a diagnosis of dementia.     Summary of capacity screener: The patient retains decision making capacity for discharge decisions at this time. Luckily, he is voluntarily willing to stay here, have assistance upon discharge, and work with treatment team and family on discharge planning.    If a new situation were to arise where patient's capacity is called into question, don't hesitate to place another psych consult.       Tanja Rangel, PMJERONIMOP-BC  Consult/Liaison Psychiatry   [facility]

## 2024-01-23 NOTE — PROGRESS NOTES
Discharge Planner Post-Acute Rehab OT:      Discharge Plan: home with HC OT services with 24/7 hired in help vs [facility]     Precautions: falls, posterior leaning, impaired cognition     Current Status:  ADLs:  Mobility: CGA with walker, intermittent posterior leaning  Grooming: CGA -SBA standing with walker, set-up seated  Dressing: set-up with UBD seated, CGA with LBD tasks with walker. SBA with footwear seated   Bathing: Recommend tub bench-pt declined initial shower  Toileting: CGA with toilet transfer, and min A with toilet hygiene with walker.   IADLs: Unable to correctly setup 0/4 medications with med box. Anticipate assistance with med mgmt upon discharge.   Vision/Cognition: further assessment     Assessment:  Completed ADL routine with walker to increase IND and safety. Focused on g/h tasks standing to build functional standing tolerance.      Other Barriers to Discharge (DME, Family Training, etc): Pt reports having shower chair at home. Family training prior to discharge

## 2024-01-23 NOTE — PLAN OF CARE
Goal Outcome Evaluation:      Plan of Care Reviewed With: patient    Overall Patient Progress: improving    Outcome Evaluation: Plan for discharge Friday 1/26 per  note.    Orientation: A/O x3, forgetful at times.VSS on RA.    Bowel:continent to the toilet. LBM 1/22.    Bladder:continent to the toilet this shift.   Pain:no c/o pain. Lidocaine patch placed to lower back at HS.    Ambulation/Transfers:up with CG gait belt and walker to the bathroom.    Blood sugars:before dinner 139 and .  Carb coverage used at dinner.     Diet/ Liquids:Regular thin, pills whole.   Skin: Sacral mepilex for protection on coccyx.  PIV to left FA SL.  500ml LR bolus infused this shift.  +2 BLE edema, encouraged elevation of legs.  Patient refused tubigrips this evening.     Bed alarm on for safety, call light within reach. Continue with POC.

## 2024-01-23 NOTE — PLAN OF CARE
Goal Outcome Evaluation:    Overall Patient Progress: no change    Outcome Evaluation: No change in pt progress this shift    Pt is A/O x4. Answered orientation questions appropriately. No impulsive behavior overnight, able to make needs known. Denied pain, SOB, chest pain, or new symptoms. Continent B/B, LBM 1/21. Transfers A1 CGA with walker. Pt requested a snack, BG before eating was 127. 2 units insulin given for carb coverage. Follow-up BG at 0247 was 162. Correction insulin held per parameters. Pt awake intermittently throughout the night, still c/o difficulty sleeping. Call light in reach, bed alarm on. Continue plan of care.

## 2024-01-23 NOTE — PLAN OF CARE
Discharge Planner Post-Acute Rehab PT:     Discharge Plan: home with hired in help, alone periodically.     Precautions: Alarms    Current Status:  Bed Mobility: Supervision  Transfer: CGA/ SBA FWW  Gait: CGA/-200' FWW  Stairs: CGASBA B rail  Balance: Able to stand w/o support - slight posterior lean, braces against objects w/ transfers  Fall Class completed:  1/6/24    Outcome Measures:   Ibarra 1/1 = 20/56  Ibarra 1/8 = 24/56  Ibarra 1/17 = 27/56    10MWT - Comfortable Pace  - .35 m/s on 1/8                - .42 m/s on 1/16    Assessment: Pt demonstrating improved endurance with gait. Ordered w/c for home use when patient will be without caregivers.     Other Barriers to Discharge (DME, Family Training, etc):   Caregiver role for his spouse  Cognition limiting home safety

## 2024-01-23 NOTE — CARE PLAN
"Rehabilitation Medicine Wheelchair Face to Face     Diagnosis and ICD Code: Intracranial Hemorrhage 162.9.   Currently has limited mobility due to weakness and impaired coordination and balance  Current transfer status: CGA to min-A with FWW  Distance patient currently able to walk: 100-200' with CGA and FWW  Therapy team has ruled out the following less costly options:   Cane due to not supportive enough for pt to ambulate   Walker due to requires assist in order to ambulate with a FWW  Patient will use wheelchair to complete Mobility Related ADL's in the home including toileting, dressing, and self cares    Without a wheelchair patient will be unable to safely move about their home.  This equipment will allow them to be out of bed, participate in home activities with their family, and it will be part of a fall prevention plan for safe mobility.     Patient's home will accommodate use of wheelchair.  Patient has a family member willing and able to assist as necessary with wheelchair.   Patient has not expressed that they are unwilling to use the wheel chair.    Arm and leg strength: pt is grossly 4/5 throughout    Gait/balance/coordination: pt has impaired gait due to his balance deficits and impaired coordination. At time, he has a strong posterior lean creating a high fall risk. His ziegler score of 27/56 places him in a moderately high fall risk category where assist would be recommended for mobility.    Length of need: Lifetime    Current height/weight: 5' 9\"/143 lbs 4.78 oz    :1940    Charlie Paez DO  NPI# [id number]      Signature:  Date:    "

## 2024-01-23 NOTE — PROGRESS NOTES
Team rounds this morning. Team updates on all that took place yesterday and what is continued to be coordinated for a safe discharge plan. Pt son Maxx emailed this writer and multiple other people who are involved (including Sanna and Autumn) and expressing gratitude, agreement with the discharge plan, and that he would be available at 3pm today ([place] time) to discuss and coordinate further.     SW met with pt at bedside. Reintroduced self. Pt expressed remembering this writer from yesterday. Explained to pt again that there is no confirmed discharge date, that SW is working on coordinating with his family, and that this writer would update him once a plan is in place. Pt expressed understanding and agreement. Pt stated that his wife is calling him multiple times. Will ask family to contact his wife, considering that she will not remember SW call or update, and to inform her of the plan.     Called Autumn to check on the plan for caregiver support and OOP payment. Autumn with another client and will call back. Called Sanna to check in. Per Sanna, Sanna will continue to work with the pt and family on finding [facility] placement and working on finances long-term. Per Sanna, [facility] is a location that has availability (or pending availability) and is reviewing further. RN at [facility] was given this writer information to follow-up if additional questions arise. Discussed how any RN assessment for [facility] placement would most likely have to take place at home, considering the goal to discharge home ASAP. Sanna in agreement and denied additional needs, questions, or concerns.     Spoke with Maxx on the phone around 3pm. Discussed updates from end of the day yesterday. Updated Maxx on meeting with pt and pt expressing multiple calls from his wife and feeling overwhelmed. Kane expressed apology and stated that he and his siblings would contact pt's wife to update and try to de-escalate. Maxx stated that he spoke with his  "mom this morning and cleaning service out to the home this morning. Maxx asked what to do if we set all this up and pt wife refuses to allow people into the home. Discussed making a report and mandated reporters. Informed Lilly on how to call to report himself. Maxx expressed understanding and appreciation for the information. BERTHA discussed plan to put in PCP CC referral and update PCP at discharge. Maxx expressed that pt has a lot of trust in his PCP and appreciates the PCP being aware. BERTHA notified Maxx of HC set up, insurance coverage, frequency of visits, and services. Maxx expressed appreciation, understanding, and agreement with plan. Maxx stated that he would call Autumn to discuss updates and plan of care and follow up with this writer.     BERTHA received a call from Autumn. Plan to have 4 hour shifts of AM and PM cares at home. Autumn would like to know more about pt's routine and made a plan to come to the unit tomorrow around 12 to meet with pt, discuss further, and to check on pt understanding of the plan. Autumn would like to meet with RN at that time to go over medications. Autumn stated that she informed Maxx of the recommendation for a bed on the main level. Maxx asked Autumn to discuss that with pt and she plans to do so. Autumn confirmed that she too is a mandated reported. Autumn notified of the mandated reported made earlier in pt stay. Autumn stated that when she went to the home to see pt wife, she did not feel that the house was \"that bad\" and that she is aware that pt CELESTINE (dtr ) has been going into the home to check on pt wife and help her with her care needs multiple times/week. Autumn appreciative of the information. Will plan to meet tomorrow at noon.     BERTHA emailed Maxx and the rest of the people involved in his cares (Lilly, two daughters, Sanna, and Autumn) to confirm the plan to meet with Autumn, to target a discharge of Thurs/Fri of this " week, that HC is set up, and that Autumn and BERTHA will talk to pt about a bed, but that it has to be ordered ASAP. Also recommended bed rails if ordering a bed.     BERTHA will continue to follow.     ---------------------------------------------------------------------------------------------------  Pt diana Lilly (lives in [place], 14 hour time difference):  PH: [phone] (skype call)  E: [email or web address]      Pt dtr's (also on the chain emails between pt diana Lilly, Landry, & Autumn):  Brittani Sandy, E: [email or web address]   Camille Ji, E: [email or web address]     Home Health Care:   [facility]   PH: [phone]  Fax: [phone]  RN, PT, OT, SLP, SALOMON, and BERTHA Fine PH: [phone]     Senior Care Authority:  Landry Quezada  PH: [phone]  E: [email or web address]     [facility] (Private Pay):   Autumn Office: [phone] or Cell: [phone]  E: [email or web address]     VA Report: [id number] (Made by LEV Escobedo)  Adult protection Yael PH: [phone]  ---------------------------------------------------------------------------------------------------  KENJI Castorena   [facility]   Direct Phone: [phone]  I   Pager: [phone]  I  Fax: [phone]

## 2024-01-23 NOTE — PROGRESS NOTES
[facility]   Acute Rehabilitation Unit    INTERVAL HISTORY  Notes and labs reviewed over past 24 hours. No acute overnight events reported    Patient was seen and examined. He reports sleeping better last night. He has no other acute concerns or complaints at this time. Appreciate social works effort in working on a safe discharge home as disposition is increasingly complex in this case.     Blood pressure elevated above goal over past 24 hours. Will continue to assess. May consider increasing/adding medication if trend continues.     ROS: 10 point ROS was assessed and was negative unless otherwise stated in HPI      Functionally,    With PT: 1/22    Current Status:  Bed Mobility: Supervision  Transfer: CGA/ SBA FWW  Gait: CGA/-200' FWW  Stairs: CGASBA B rail  Balance: Able to stand w/o support - slight posterior lean, braces against objects w/ transfers  Fall Class completed:  1/6/24     Outcome Measures:   Ibarra 1/1 = 20/56  Ibarra 1/8 = 24/56  Ibarra 1/17 = 27/56    With OT: 1/22    Current Status:  ADLs:  Mobility: CGA with walker, intermittent posterior leaning  Grooming: CGA -SBA standing with walker, set-up seated  Dressing: set-up with UBD seated, CGA with LBD tasks with walker. SBA with footwear seated   Bathing: Recommend tub bench-pt declined initial shower  Toileting: CGA with toilet transfer, and min A with toilet hygiene with walker.   IADLs: Unable to correctly setup 0/4 medications with med box. Anticipate assistance with med mgmt upon discharge.   Vision/Cognition: further assessment    With SLP: 1/22    Current Status:  Hearing: WFL  Vision: Glasses  Communication: WFL  Cognition: WNL per CLQT. Moderate reasoning, mild attention and memory deficits during tasks.   Swallow: regular, thin (0) liquid. NO STRAWS           MEDICATIONS  Scheduled meds   acetaminophen  650 mg Oral TID    amLODIPine  10 mg Oral Daily    carvedilol  12.5 mg Oral BID w/meals     "enoxaparin ANTICOAGULANT  40 mg Subcutaneous Q24H    insulin aspart   Subcutaneous Daily with breakfast    insulin aspart   Subcutaneous Daily with lunch    insulin aspart   Subcutaneous Daily with supper    insulin aspart  1-7 Units Subcutaneous TID AC    insulin aspart  1-5 Units Subcutaneous BID    insulin glargine  7 Units Subcutaneous Q24H    irbesartan  300 mg Oral At Bedtime    isosorbide mononitrate  60 mg Oral QPM    lidocaine  1 patch Transdermal Q24h    mirtazapine  15 mg Oral At Bedtime    ramelteon  8 mg Oral At Bedtime    tamsulosin  0.4 mg Oral QAM       PRN meds:  acetaminophen, glucose **OR** dextrose **OR** glucagon, insulin aspart, menthol (Topical Analgesic) 2.5%, - MEDICATION INSTRUCTIONS -, senna-docusate      PHYSICAL EXAM  BP (!) 159/79 (BP Location: Right arm, Patient Position: Sitting)   Pulse 70   Temp 97.7  F (36.5  C) (Oral)   Resp 16   Ht 1.753 m (5' 9\")   Wt 65 kg (143 lb 4.8 oz)   SpO2 93%   BMI 21.16 kg/m    General: in no acute distress, conversational and alert, up at edge of bed.   HEENT: atraumatic, nares clear, conjunctiva white, oral mucosa dry  Pulmonary: on room air, symmetrical chest rise  Cardiovascular: RRR, no murmur auscultated, well-perfused peripherally  Abdominal: soft, non-tender to palpation, non-distended  Extremities: warm peripherally, no edema in BLEs  Skin: warm, dry, without erythema, ecchymosis, or rash noted  MSK/Neuro: Moves all 4 extremities volitionally and against gravity. A&O X3. Able to listen to and follow commands appropriately. Speech coherent and comprehensible      LABS  Results for orders placed or performed during the hospital encounter of 12/29/23 (from the past 24 hour(s))   Glucose by meter   Result Value Ref Range    GLUCOSE BY METER POCT 151 (H) 70 - 99 mg/dL   Glucose by meter   Result Value Ref Range    GLUCOSE BY METER POCT 139 (H) 70 - 99 mg/dL   Glucose by meter   Result Value Ref Range    GLUCOSE BY METER POCT 153 (H) 70 - 99 " mg/dL   Glucose by meter   Result Value Ref Range    GLUCOSE BY METER POCT 127 (H) 70 - 99 mg/dL   Glucose by meter   Result Value Ref Range    GLUCOSE BY METER POCT 162 (H) 70 - 99 mg/dL       ASSESSMENT AND PLAN    Tc Garcia is a 83 year old right hand dominant male with a PMH of DM2 with DKA, paroxysmal atrial fibrillation formerly on apixaban, hypertension, hyperlipidemia, pelural effusion, CKD stage 3, BPH, and ACD who sustained a left cerebellar intracranial hemorrhage from a fall on 12/11/23 with a possible left medial frontal lobe infarct on imaging.  His deficits include decreased visual acuity and mild left hemiparesis. He was admitted to acute inpatient rehabilitation on 12/27/23.     Impairment group code: 02.22 Traumatic, Closed Injury; Fall with resulting intracranial hemorrhage        PT, OT and SLP 60 minutes of each on a daily basis, in addition to rehab nursing and close management of physiatrist.       Impairment of ADL's: OT for 60 min daily to work on upper and lower body self care, dressing, toileting, bathing, energy conservation techniques with use of ADs as needed.      Impairment of mobility:  PT for 60 min daily to work on gait exercises, strengthening, endurance buildup, transfers with use of walker as needed.      Impairment of cognition/language/swallow:  SLP for 60 min daily for cognitive evaluation and treatment strategies for higher level cognitive deficits and memory impairment.      Rehab RN to administer medication, patient education on medication taking, VS monitoring, bowel regimen, glucose monitoring and wound care/surgical wound dressing changes and monitoring.            Medical Conditions     #Traumatic brain injury  #Intraparenchymal hematoma    #Mild hydrocephalus   #H/o spontaneous intracranial hemorrhage (1/2023)  paroxysmal atrial fibrillation (on eliquis prior to admission)  Presented with dizziness diplopia and nausea. Workup found 1.5 X 3 cm intraparenchymal  hematoma centered in inferior cerebellar vermis with likely extension into fourth ventricle; no hydrocephalus. Etiology ruled likely hypertensive in setting of chronic anticoagulation after fall.    - follow up with stroke neurology 6-8 weeks from 12/27  --Had left hand weakness on 12/31 and stat head CT showed no new intracranial abnormalities  -Continue PT, OT, SLP  -Underwent neuropsych testing, awaiting final report.       #Chronic C7 compression fracture  #Mid thoracic Back pain  No dynamic instability of C7 fracture  -01/12, due to persistent back pain, scheduled tylenol 650mg TID with additional PRN available. Also scheduled lidocaine patches.         #Chronic diastolic CHF  #Atrial fibrillation PTA on Eliquis  #Hypertension  #Hyperlipidemia  Previously on apixaban, held after ICH. Anticoagulation was reversed and held. Echocardiogram with EF of 60 to 65%.  Severe biatrial enlargement.  Mild to moderate TR.  - lovenox ppx  - stroke neurology in 6-8 weeks  - BP goal systolic 130-150. 1/23 BP has been upper limit of goal over past 24 hours. Continue to assess.   - continue amlodipine  - continue carvedilol  - continue avapro  - hold PTA demadex  - follow up with cardiology in 1 month to discuss Watchman        #Diabetic ketoacidosis, resolved  #Diabetes mellitus type II HgbA1c 8.1% 12/12/23  Patient's insulin pump had been on hold since admission in the setting of IPH. Blood sugars labile during hospital stay. Patient went into DKA on 12/24/2023.  Likely triggered by infection with rise in the WBC count hence pump discontinued and patient switched to insulin drip per DKA protocol. DKA resolved on 12/25/2023, but then he had another episode of likely DKA on morning of 12/28/23 which may have been due to patient confusion about use of pump / injectable insulin  - Endo consulted and signed off. 1/02, restarted PTA insulin pump, but due to cognitive impairments to properly and safely administer insulin, giving  excess or too little insulin, he was taken off of the insulin pump and placed on basal lantus and carb coverage novolog.   Insulin Regimen  -  Lantus 7 unit(s) q24 hours at 1800 hrs  -  1/18 Increased Novolog to 1 unit(s) per 8 g cho breakfast, per primary service: 1:13 g cho for lunch  1:14 for dinner a  Cover all intake.                 -  Novolog custom 1/65 resistance TID AC/HS and 0200                  -  BG TID AC/HS and 0200       #Adjustment Disorder   Patient is feeling down and hopless with what he feels is little progression functionally. Reporting he is worried about his health status and what will happen to his wife if he is unable to care for her.   -Clinical Psychology consult placed to evaluate and treat  -Continue Remeron         #Insomnia, improving  -1/08 continue remelteon and melatonin  -1/16, starting remeron for mood but will also help with sleep, will continue         #Possible aspiration pneumonia.  Agitation, leukocytosis on 12/12 to 12/13. No growth blood cultures.  CXR R > L lower lung opacity.  Completed IV ceftriaxone - cefuroxime course.  - CXR in ~ 4 weeks from ARU admission. 1/23 4 week follow up CXR due, will obtain today   - Incentive spirometry  - aspiration precautions     #Onychomycosis  -Podiatry saw and debrided all 10 toenails. Will reconsult if any new concerns arise.      #Presumed sepsis likely due to UTI  Patient went into DKA with rising white count and mildly positive UA.  Of note, patient had just completed treatment for blood pneumonia UTI, question if this was not completely treated.  Urine culture with no growth. Started on vanc/zosyn and narrowed to ceftriaxone 12/27/23  - Completed 7 day total course of abx with ceftriaxone on 1/2/24        #CKD Stage 3  Baseline creatinine 1-1.1  - continue irbesartan  - consider readding torsemide prn for edema  - Creatinine 1.42, will give IVF bolus this evening.   -Continue to assess with Monday Thursday labs     #Anemia of  chronic disease   -01/22 Hgb stable at 8.6  -- Continue to assess with weekly labs     Adjustment to disability:  Clinical psychology consulted, to eval and treat  Bowel: PRN meds available  Bladder: Continent  DVT Prophylaxis: Lovenox  GI Prophylaxis: On regular diet, will assess for GI symptoms  Code: DNR/DNI  Disposition: Home vs. Alternate level of care.  ELOS:   Pending disposition and home care set up  Follow up Appointments on Discharge:   Primary care provider, Jessika Cordoba in 1-2 weeks from discharge  Stroke neurology in 6-8 weeks from 12/27/23  cardiology in 1 month from 12/27/23  CXR in ~ 1/27/24  Diabetes follow up: Dr Asif at [facility] of Endocrinology       Seen and discussed with Dr. Paez, PM&R staff physician     El Escaelra DO  PGY2  Physical Medicine and Rehabilitation-[facility]  Pager: [phone]

## 2024-01-23 NOTE — PROGRESS NOTES
Acute Rehab Care Conference/Team Rounds      Type: Team Rounds    Present: Dr. Charlie Paez, Resident Dr. El Escalera, Neuropsychologist Intern, Elan Yost PT, Sia Walker OT, Mariposa Jang SLP, Linn VENEGAS, Radha Barlow RN CC, Naz Johnson RD, Velma Stoddard RN, Juliana Alexander Therapy Supervisor, Jeannie Rangel ARU Director, and Tc Garcia Patient.     Discharge Barriers/Treatment/Education    Rehab Diagnosis: post fall with ICH     Active Medical Co-morbidities/Prognosis:     Patient Active Problem List   Diagnosis Code    DM type 2, Hgb A1C 8.2 on 4/25/20 E11.9    Mixed hyperlipidemia E78.2    Essential hypertension I10    Pain in limb M79.609    Plantar fascial fibromatosis M72.2    HYPERLIPIDEMIA LDL GOAL <100 E78.5    Hemothorax on left J94.2    Recurrent Left Pleural effusion -- S/P Pleurex Cath 5/12/20 J90    ABBIE (acute kidney injury) (H24) N17.9    Acute respiratory failure with hypoxia (H) J96.01    Pulmonary hypertension (H) I27.20    CRF (chronic renal failure), stage 3  N18.30    Anemia due to chronic kidney disease N18.9, D63.1    Atrial fibrillation/flutter -- on Eliquis and Amiodarone I48.91    DKA (diabetic ketoacidoses) E11.10    Anemia in CKD (chronic kidney disease) N18.9, D63.1    Dyspnea R06.00    SOB (shortness of breath) R06.02    Non-recurrent bilateral inguinal hernia without obstruction or gangrene K40.20    Acute cholecystitis K81.0    Abdominal pain, generalized R10.84    Non-intractable vomiting with nausea, unspecified vomiting type R11.2    Alcohol dependence (H) F10.20    CHF (congestive heart failure) (H) I50.9    Syncope and collapse R55    Weakness R53.1    Postoperative state Z98.890    Acute kidney injury (H24) N17.9    Chronic diastolic heart failure (H) I50.32    Anticoagulated Z79.01    Right-sided nontraumatic intraventricular intracerebral hemorrhage (H) I61.5    Dehydration E86.0    Hypoglycemia E16.2    Hypoxia R09.02    DNR (do not resuscitate)  Z66    Hypotension, unspecified hypotension type I95.9    Diabetic nephropathy associated with type 2 diabetes mellitus (H) E11.21    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, or unspecified(403.00) I12.9    Nephrotic range proteinuria R80.9    Secondary hyperparathyroidism of renal origin (H24) N25.81    Stage 3b chronic kidney disease (H) N18.32    Vitamin D deficiency E55.9    Diabetic ketoacidosis without coma associated with type 2 diabetes mellitus (H) E11.10    Intraparenchymal hemorrhage of brain (H) I61.9    Acute cystitis without hematuria N30.00    Constipation, unspecified constipation type K59.00    Pain in extremity, unspecified extremity M79.609    Type 2 diabetes mellitus with other specified complication, with long-term current use of insulin (H) E11.69, Z79.4    Nontraumatic intraventricular intracerebral hemorrhage, unspecified laterality (H) I61.5    Iron deficiency anemia D50.9    Intracranial hemorrhage (H) I62.9    Hyperglycemia R73.9    Inadequately controlled diabetes mellitus (H) E11.65         Safety: Pt is A/O x4. Answered orientation questions appropriately. No impulsive behavior overnight, able to make needs known. Transfers A1 CGA with walker. Pt awake intermittently throughout the night, still c/o difficulty sleeping. Call light in reach, bed alarm on, side rails x3.    Pain: Denied pain. Managed with Tylenol.    Medications, Skin, Tubes/Lines: Takes medications whole with water. Blanchable redness on coccyx, covered with sacral Mepilex. Left wrist skin tear scabbed over. Left PIV is saline-locked.    Swallowing/Nutrition: regular, thin (0) liquid. NO STRAWS. Goals met     Bowel/Bladder: Continent B/B, LBM 1/22.    Psychosocial: , lives in a home with spouse. Spouse has dementia and pt was the primary caregiver. 3 adult children, one local, one out-of-state, and one in [place]. No mental health or substance abuse concerns.  Retired. Falls at home prior to admission.     ADLs/IADLs: Pt continues to make slow progress with ADLs d/t impaired balance and cognition. Pt continues to require set-up with UBD seated in chair. Pt requires SBA to thread BLE's seated and CGA to pull clothing up when standing with walker. Pt requires CGA with toilet transfer and min A with toilet hygiene with walker. Pt requires CGA with G/H tasks standing at sink with walker. Pt requires assistance with all IADLs upon discharge d/t impaired balance and cognition. Recommending 24/7 assistance upon discharge d/t impaired balance and cognition. Pt ready for discharge once services are arranged. Barriers to home discharge is pt previously was caregiver for spouse, and unable to return to the role of caregiver. HC OT services upon discharge.     Mobility: Pt continues to make slow progress with PT, likely near functional baseline which presented previous safety concerns with home. Mobility generally SBA to CGA, but requires min-A to steady at times due to fluctuations. Tolerates gait 100-200' with a FWW. Discharge barrier primarily safe discharge plan as pt was previously caregiver for his spouse, but now has increased mobility and cognitive deficits.  PT follow-up. No new equipment needs.  Ibarra 1/1 = 20/56  Ibarra 1/8 = 24/56  Ibarra 1/17 = 27/56      Cognition/Language: Moderate reasoning, mild attention and memory deficits subjectively noted across structured tasks. WNL per CLQT. Pt participates well in therapy. Demonstrates ongoing difficulties with developing plan to solve more complex problems and ongoing poor organization and alternating attention. Continue to recommend total IADL assist for pt and spouse (with dementia who he was caring for at baseline) at discharge.  SLP     Community Re-Entry: Home bound due to mobility and cognitive status    Transportation: Pt not a , family able to provide    Decision maker: self and child    Plan of Care and goals  reviewed and updated.    Discharge Plan/Recommendations    Fall Precautions: continue    Patient/Family input to goals: yes     Estimated length of stay: 18 days     Overall plan for the patient: reach a level of mod I       Utilization Review and Continued Stay Justification    Medical Necessity Criteria:    For any criteria that is not met, please document reason and plan for discharge, transfer, or modification of plan of care to address.    Requires intensive rehabilitation program to treat functional deficits?: Yes    Requires 3x per week or greater involvement of rehabilitation physician to oversee rehabilitation program?: Yes    Requires rehabilitation nursing interventions?: Yes    Patient is making functional progress?: Yes    There is a potential for additional functional progress? Yes    Patient is participating in therapy 3 hours per day a minimum of 5 days per week or 15 hours per week in 7 day period?:Yes    Has discharge needs that require coordinated discharge planning approach?:Yes      Barriers/Concerns related to meeting medical necessity criteria:  none    Team Plan to Address Concern:  As needed      Final Physician Sign off    Statement of Approval: Charlie Paez, DO      Patient Goals    Social Work Goals: See SW notes for more information. Working on coordinating a safe discharge plan.     OT Predicted Duration/Target Date for Goal Attainment: 01/26/24  Therapy Frequency (OT): 6 times/week  OT: Hygiene/Grooming: supervision/stand-by assist, while standing  OT: Upper Body Dressing: including set-up/clothing retrieval  OT: Lower Body Dressing: Supervision/stand-by assist  OT: Upper Body Bathing: Supervision/stand-by assist, using adaptive equipment  OT: Lower Body Bathing: Supervision/stand-by assist, using adaptive equipment  OT: Bed Mobility: Modified independent  OT: Transfer: Supervision/stand-by assist, with assistive device  OT: Toilet Transfer/Toileting: Supervision/stand-by  assist, toilet transfer, cleaning and garment management, using adaptive equipment  OT: Meal Preparation: Completed  OT: Home Management: Completed  OT: Cognitive: Completed     PT Predicted Duration/Target Date for Goal Attainment: 01/12/24  PT Frequency: 6x/week  PT: Bed Mobility: Modified independent, Supine to/from sit, Rolling  PT: Transfers: Modified independent, Bed to/from chair, Sit to/from stand  PT: Gait: Modified independent, Rolling walker, 150 feet  PT: Stairs: 3 stairs, Modified independent, Rail on right  PT: Goal 2: Pt to be sba to mod I with advanced car transfer                      SLP Predicted Duration/Target Date for Goal Attainment: 01/06/24  Therapy Frequency (SLP Eval): 6 times/week  SLP: Safely tolerate diet without signs/symptoms of aspiration: Regular diet, Thin liquids, With use of swallow precautions met  SLP: Goal 1: Pt will demonstrate the ability to manage medications and finances x 100% accuracy with occasional cues.      RN Goal 1: Mobility: Pt will progress to MOD I prior to discharge from ARU    RN Goal 2: Skin Integrity: Pt will be free from skin breakdown related to pressure and moisture throughout the ARU stay     RN Goal 3: Safety Management: Pt will be free from falls/harm throughout the ARU stay by using call light appropriately and waiting for assistance as indicated

## 2024-01-23 NOTE — PLAN OF CARE
"Discharge Planner Post-Acute Rehab SLP:      Discharge Plan: TBD - [facility] for pt and spouse vs HH ?     Precautions: Fall, alarms     Current Status:  Hearing: WFL  Vision: Glasses  Communication: WFL  Cognition: WNL per CLQT. Moderate reasoning, mild attention and memory deficits during tasks.   Swallow: regular, thin (0) liquid. NO STRAWS      Assessment:  AM: Pt engaged in moderate problem solving/numerical reasoning tasks on iPad via [employer] steven. Pt completed numerical reasoning tasks with mild cueing during beginning of task. Pt was able to perform simple math equations with average processing speeds. Pt said answer aloud and SLP assisted in typing in answer due to difficulties with iPad touch sensitivity with typing in the correct numbers. Pt completed problem solving tasks with moderate cueing throughout task. Pt had difficulties with inferring where items should be placed after reading directions with specific instructions. Reviewed memory strategies with pt. Pt able to recall strategies and when given a scenario able to appropriately utilize strategies.     PM: Pt engaged in \"Flow\" steven on iPad for planning and reasoning. Pt was able to complete all levels independently with 100% accuracy. Pt identified mistake and solved puzzle appropriately. Minimal cueing needed for final puzzle.     Other Barriers to Discharge (Family Training, etc): family training: IADL assist    "

## 2024-01-24 NOTE — PROGRESS NOTES
[facility]   Acute Rehabilitation Unit    INTERVAL HISTORY  Notes and labs reviewed over past 24 hours. No acute overnight events reported.  Seen this AM resting in bed.  Had good therapy session and is recharging.  BP seems to be running well.  Follow-up chest xray shows stable small left pleural effusion versus pleural thickening and slightly improved left greater than right central and bibasilar atelectasis/scarring. No new focal airspace opacity.  Continue to coordinate optimal discharge  home at end of week.     ROS: 10 point ROS was assessed and was negative unless otherwise stated in HPI      Functional  PT:  Bed Mobility: Supervision  Transfer: CGA/ SBA FWW  Gait: CGA/-200' FWW  Stairs: CGASBA B rail  Balance: Able to stand w/o support - slight posterior lean, braces against objects w/ transfers  Fall Class completed:  1/6/24     Outcome Measures:   Ibarra 1/1 = 20/56  Ibarra 1/8 = 24/56  Ibarra 1/17 = 27/56     10MWT - Comfortable Pace  - .35 m/s on 1/8                                                 - .42 m/s on 1/16          MEDICATIONS  Scheduled meds   acetaminophen  650 mg Oral TID    amLODIPine  10 mg Oral Daily    carvedilol  12.5 mg Oral BID w/meals    enoxaparin ANTICOAGULANT  40 mg Subcutaneous Q24H    insulin aspart   Subcutaneous Daily with breakfast    insulin aspart   Subcutaneous Daily with lunch    insulin aspart   Subcutaneous Daily with supper    insulin aspart  1-7 Units Subcutaneous TID AC    insulin aspart  1-5 Units Subcutaneous BID    insulin glargine  7 Units Subcutaneous Q24H    irbesartan  300 mg Oral At Bedtime    isosorbide mononitrate  60 mg Oral QPM    lidocaine  1 patch Transdermal Q24h    mirtazapine  15 mg Oral At Bedtime    ramelteon  8 mg Oral At Bedtime    tamsulosin  0.4 mg Oral QAM       PRN meds:  acetaminophen, glucose **OR** dextrose **OR** glucagon, insulin aspart, menthol (Topical Analgesic) 2.5%, - MEDICATION INSTRUCTIONS -,  "senna-docusate      PHYSICAL EXAM  /68   Pulse 72   Temp 97.8  F (36.6  C) (Oral)   Resp 16   Ht 1.753 m (5' 9\")   Wt 65 kg (143 lb 4.8 oz)   SpO2 92%   BMI 21.16 kg/m    General: in no acute distress, conversational and alert, resting in bed   HEENT: atraumatic, nares clear, conjunctiva white, oral mucosa dry  Pulmonary: on room air, symmetrical chest rise  Cardiovascular: RRR, no murmur auscultated, well-perfused peripherally  Abdominal: soft, non-tender to palpation, non-distended  Extremities: warm peripherally, no edema in BLEs  Skin: warm, dry, without erythema, ecchymosis, or rash noted  MSK/Neuro: Moves all 4 extremities volitionally and against gravity. A&O X3. Able to listen to and follow commands appropriately. Speech coherent and comprehensible      LABS  Results for orders placed or performed during the hospital encounter of 12/29/23 (from the past 24 hour(s))   Glucose by meter   Result Value Ref Range    GLUCOSE BY METER POCT 205 (H) 70 - 99 mg/dL   XR Chest 2 Views    Narrative    EXAM: XR CHEST 2 VIEWS 1/23/2024 3:59 PM    HISTORY: 4 week follow up CXR to evaluate previous lower lung  opacities..    COMPARISON: 12/24/2023 chest radiograph, 1/16/2023 chest CT.    TECHNIQUE: Upright frontal and lateral views of the chest.    FINDINGS: Stable prominent cardiac silhouette. Decreased left greater  than right perihilar mixed airspace and interstitial opacification. No  new focal airspace opacity. Stable small left pleural effusion versus  pleural thickening. Small locule of fluid is seen about the peripheral  lingula. No appreciable pneumothorax. No new consolidation. Upper  abdomen is within normal. Multiple mildly displaced left rib fractures  deformities, as seen on prior CT. Lower thoracic anterior wedge  compression deformity and upper lumbar vertebroplasty changes.      Impression    IMPRESSION: Stable small left pleural effusion versus pleural  thickening and slightly improved left " greater than right central and  bibasilar atelectasis/scarring. No new focal airspace opacity.    I have personally reviewed the examination and initial interpretation  and I agree with the findings.    KEVIN CARIAS DO         SYSTEM ID:  [id number]   Glucose by meter   Result Value Ref Range    GLUCOSE BY METER POCT 172 (H) 70 - 99 mg/dL   Glucose by meter   Result Value Ref Range    GLUCOSE BY METER POCT 154 (H) 70 - 99 mg/dL   Glucose by meter   Result Value Ref Range    GLUCOSE BY METER POCT 129 (H) 70 - 99 mg/dL   Glucose by meter   Result Value Ref Range    GLUCOSE BY METER POCT 248 (H) 70 - 99 mg/dL       ASSESSMENT AND PLAN    Tc Garcia is a 83 year old right hand dominant male with a PMH of DM2 with DKA, paroxysmal atrial fibrillation formerly on apixaban, hypertension, hyperlipidemia, pelural effusion, CKD stage 3, BPH, and ACD who sustained a left cerebellar intracranial hemorrhage from a fall on 12/11/23 with a possible left medial frontal lobe infarct on imaging.  His deficits include decreased visual acuity and mild left hemiparesis. He was admitted to acute inpatient rehabilitation on 12/27/23.     Impairment group code: 02.22 Traumatic, Closed Injury; Fall with resulting intracranial hemorrhage        PT, OT and SLP 60 minutes of each on a daily basis, in addition to rehab nursing and close management of physiatrist.       Impairment of ADL's: OT for 60 min daily to work on upper and lower body self care, dressing, toileting, bathing, energy conservation techniques with use of ADs as needed.      Impairment of mobility:  PT for 60 min daily to work on gait exercises, strengthening, endurance buildup, transfers with use of walker as needed.      Impairment of cognition/language/swallow:  SLP for 60 min daily for cognitive evaluation and treatment strategies for higher level cognitive deficits and memory impairment.      Rehab RN to administer medication, patient education on medication taking, VS  monitoring, bowel regimen, glucose monitoring and wound care/surgical wound dressing changes and monitoring.            Medical Conditions     #Traumatic brain injury  #Intraparenchymal hematoma    #Mild hydrocephalus   #H/o spontaneous intracranial hemorrhage (1/2023)  paroxysmal atrial fibrillation (on eliquis prior to admission)  Presented with dizziness diplopia and nausea. Workup found 1.5 X 3 cm intraparenchymal hematoma centered in inferior cerebellar vermis with likely extension into fourth ventricle; no hydrocephalus. Etiology ruled likely hypertensive in setting of chronic anticoagulation after fall.    - follow up with stroke neurology 6-8 weeks from 12/27  --Had left hand weakness on 12/31 and stat head CT showed no new intracranial abnormalities  -Continue PT, OT, SLP  -Underwent neuropsych testing, awaiting final report.       #Chronic C7 compression fracture  #Mid thoracic Back pain  No dynamic instability of C7 fracture  -01/12, due to persistent back pain, scheduled tylenol 650mg TID with additional PRN available. Also scheduled lidocaine patches.         #Chronic diastolic CHF  #Atrial fibrillation PTA on Eliquis  #Hypertension  #Hyperlipidemia  Previously on apixaban, held after ICH. Anticoagulation was reversed and held. Echocardiogram with EF of 60 to 65%.  Severe biatrial enlargement.  Mild to moderate TR.  - lovenox ppx  - stroke neurology in 6-8 weeks  - BP goal systolic 130-150. 1/23 BP has been upper limit of goal over past 24 hours. Continue to assess. 1/24 running well today.   - continue amlodipine  - continue carvedilol  - continue avapro  - hold PTA demadex  - follow up with cardiology in 1 month to discuss Watchman        #Diabetic ketoacidosis, resolved  #Diabetes mellitus type II HgbA1c 8.1% 12/12/23  Patient's insulin pump had been on hold since admission in the setting of IPH. Blood sugars labile during hospital stay. Patient went into DKA on 12/24/2023.  Likely triggered by  infection with rise in the WBC count hence pump discontinued and patient switched to insulin drip per DKA protocol. DKA resolved on 12/25/2023, but then he had another episode of likely DKA on morning of 12/28/23 which may have been due to patient confusion about use of pump / injectable insulin  - Endo consulted and signed off. 1/02, restarted PTA insulin pump, but due to cognitive impairments to properly and safely administer insulin, giving excess or too little insulin, he was taken off of the insulin pump and placed on basal lantus and carb coverage novolog.   Insulin Regimen  -  Lantus 7 unit(s) q24 hours at 1800 hrs  -  1/18 Increased Novolog to 1 unit(s) per 8 g cho breakfast, per primary service: 1:13 g cho for lunch  1:14 for dinner a  Cover all intake.                 -  Novolog custom 1/65 resistance TID AC/HS and 0200                  -  BG TID AC/HS and 0200       #Adjustment Disorder   Patient is feeling down and hopless with what he feels is little progression functionally. Reporting he is worried about his health status and what will happen to his wife if he is unable to care for her.   -Clinical Psychology consult placed to evaluate and treat  -Continue Remeron         #Insomnia, improving  -1/08 continue remelteon and melatonin  -1/16, starting remeron for mood but will also help with sleep, will continue         #Possible aspiration pneumonia.  Agitation, leukocytosis on 12/12 to 12/13. No growth blood cultures.  CXR R > L lower lung opacity.  Completed IV ceftriaxone - cefuroxime course.  - CXR in ~ 4 weeks from ARU admission. 1/23 4 week follow up CXR shows stable small left pleural effusion versus pleural  thickening and slightly improved left greater than right central and  bibasilar atelectasis/scarring. No new focal airspace opacity.  - Incentive spirometry  - aspiration precautions     #Onychomycosis  -Podiatry saw and debrided all 10 toenails. Will reconsult if any new concerns arise.       #Presumed sepsis likely due to UTI  Patient went into DKA with rising white count and mildly positive UA.  Of note, patient had just completed treatment for blood pneumonia UTI, question if this was not completely treated.  Urine culture with no growth. Started on vanc/zosyn and narrowed to ceftriaxone 12/27/23  - Completed 7 day total course of abx with ceftriaxone on 1/2/24        #CKD Stage 3  Baseline creatinine 1-1.1  - continue irbesartan  - consider readding torsemide prn for edema  - Creatinine 1.42, will give IVF bolus yesterday evening.   -Continue to assess with Monday Thursday labs     #Anemia of chronic disease   -01/22 Hgb stable at 8.6  -- Continue to assess with weekly labs     Adjustment to disability:  Clinical psychology consulted, to eval and treat  Bowel: PRN meds available  Bladder: Continent  DVT Prophylaxis: Lovenox  GI Prophylaxis: On regular diet, will assess for GI symptoms  Code: DNR/DNI  Disposition: Home vs. Alternate level of care.  ELOS:   Pending disposition and home care set up  Follow up Appointments on Discharge:   Primary care provider, Jessika Cordoba in 1-2 weeks from discharge  Stroke neurology in 6-8 weeks from 12/27/23  cardiology in 1 month from 12/27/23  CXR in ~ 1/27/24  Diabetes follow up: Dr Asif at [facility] of Endocrinology           Charlie Paez,   Physical Medicine and Rehabilitation-[facility]        I spent a total of 25 minutes face to face and coordinating care of Tc Garcia. Over 50% of my time on the unit was spent counseling the patient and /or coordinating care regarding post CVA.

## 2024-01-24 NOTE — CONSULTS
"SPIRITUAL HEALTH SERVICES Consult Note  [facility] ([facility]) Acute Rehab    Brief visit with pt in anticipation of discharge tomorrow. Pt said he is \"really looking forward to getting home, and my wife is really looking forward to it too. Everything is set up for me to be going home tomorrow.\" No further needs indicated before discharge.     Jon Barrera) Jeanne Mcneil M.Div., [facility]  Staff   Pager [phone]      * [facility] remains available 24/7 for emergent requests/referrals, either by having the switchboard page the on-call  or by entering an ASAP/STAT consult in Epic (this will also page the on-call ). Routine Epic consults receive an initial response within 24 hours.*    "

## 2024-01-24 NOTE — PROGRESS NOTES
Discharge Planner Post-Acute Rehab OT:      Discharge Plan: home with HC OT services with 24/7 hired in help     Precautions: falls, posterior leaning, impaired cognition     Current Status:  ADLs:  Mobility: CGA with walker, intermittent posterior leaning  Grooming: CGA -SBA standing with walker, set-up seated  Dressing: set-up with UBD seated, CGA with LBD tasks with walker. SBA with footwear seated   Bathing: Recommend tub bench-pt declined initial shower  Toileting: CGA with toilet transfer, and min A with toilet hygiene with walker.   IADLs: Unable to correctly setup 0/4 medications with med box. Anticipate assistance with med mgmt upon discharge.   Vision/Cognition: further assessment     Assessment:  Completed ADL routine with walker to build functional standing tolerance, increase IND and safety. Focused on g/h tasks w/c based to increase IND for home.    Other Barriers to Discharge (DME, Family Training, etc): Pt reports having shower chair at home. Family training prior to discharge

## 2024-01-24 NOTE — DISCHARGE SUMMARY
[facility]   Acute Rehabilitation Unit  Discharge Summary     Date of Admission: 12/29/2023  Date of Discharge: 01/25/2024   Disposition: Home with home care services  Primary Care Physician: Jessika Cordoba  Attending physician: Charlie Paez DO  Other significant physician provider(s):   Endocrine      Discharge Diagnoses  02.22 Traumatic, Closed Injury; Fall with resulting intracranial hemorrhage   Mild hydrocephalus   H/o spontaneous intracranial hemorrhage (1/2023)   Chronic diastolic CHF  Atrial fibrillation PTA on Eliquis  Hypertension  Hyperlipidemia   Diabetic ketoacidosis, resolved  Diabetes mellitus type II HgbA1c 8.1% 12/12/23   Adjustment Disorder   CKD Stage 3       Medical Course  Tc Garcia is a 83 year old right hand dominant male with a PMH of DM2 with DKA, paroxysmal atrial fibrillation formerly on apixaban, hypertension, hyperlipidemia, pelural effusion, CKD stage 3, BPH, and ACD who sustained a left cerebellar intracranial hemorrhage from a fall on 12/11/23 with a possible left medial frontal lobe infarct on imaging.  His deficits include decreased visual acuity and mild left hemiparesis. He was admitted to acute inpatient rehabilitation on 12/27/23.     Throughout their hospital stay, they worked daily with PT, OT, and SLP and were seen daily by PMR physician. He progressed well with therapies and reached plateu (see above functional status) which appears to be his baseline. Hospital course was complicated by uncontrolled blood sugars. Patient admitted on insulin pump which he was unable to adequately manage. He was transitioned to carb based insulin coverage with basal coverage at night. Appreciate endocrines assistance in that transition. He also required intermittent intravenous fluids throughout his stay for adequate hydration. Appreciate social works tireless effort in this complex disposition.     Rehabilitation  Course    PT: At admission, mod assist for transfers, ambulation,   On discharge, CGA/SBA with FWW for transfers and mobility    OT: At admission, mod assist lower body dressing, and toilet transfers.   On discharge, Set up assist with UBD, CGA with LBD. SBA with footwear. CGA wih toilet transfer and Ainbal with toilet hygiene with walker.     SLP: At admission, not impaired  On discharge, Hearing: WFL  Vision: Glasses  Communication: WFL  Cognition: WNL per CLQT. Moderate reasoning, mild attention and memory deficits during tasks.   Swallow: regular, thin (0) liquid. NO STRAWS     DISCHARGE MEDICATIONS  Current Discharge Medication List        START taking these medications    Details   Alcohol Swabs PADS Use to swab the area of the injection or bina as directed Per insurance coverage  Qty: 100 each, Refills: 0    Associated Diagnoses: Inadequately controlled diabetes mellitus (H)      blood glucose (NO BRAND SPECIFIED) lancets standard To use to test glucose level in the blood Use to test blood sugar  4  times daily as directed. To accompany glucose monitor brands per insurance coverage.  Qty: 100 each, Refills: 0    Associated Diagnoses: Inadequately controlled diabetes mellitus (H)      blood glucose (NO BRAND SPECIFIED) test strip To use to test glucose level in the blood Use to test blood sugar  4 times daily as directed. To accompany glucose monitor brands per insurance coverage.  Qty: 100 strip, Refills: 0    Associated Diagnoses: Inadequately controlled diabetes mellitus (H)      blood glucose monitoring (NO BRAND SPECIFIED) meter device kit Use as directed Per insurance coverage  Qty: 1 kit, Refills: 0    Associated Diagnoses: Inadequately controlled diabetes mellitus (H)      glucose (BD GLUCOSE) 4 g chewable tablet Take 4 tablets by mouth every 15 minutes as needed for low blood sugar  Qty: 50 tablet, Refills: 0    Associated Diagnoses: Inadequately controlled diabetes mellitus (H)      !! insulin aspart  (NOVOLOG PEN) 100 UNIT/ML pen Inject 1-10 Units Subcutaneous Take with snacks or supplements for high blood sugar  Qty: 15 mL, Refills: 0    Associated Diagnoses: Inadequately controlled diabetes mellitus (H)      !! insulin aspart (NOVOLOG PEN) 100 UNIT/ML pen Inject 1-7 Units Subcutaneous 3 times daily (before meals)  Qty: 15 mL, Refills: 0    Associated Diagnoses: Inadequately controlled diabetes mellitus (H)      !! insulin aspart (NOVOLOG PEN) 100 UNIT/ML pen Inject 1-5 Units Subcutaneous 2 times daily  Qty: 15 mL, Refills: 0    Associated Diagnoses: Inadequately controlled diabetes mellitus (H)      !! insulin aspart (NOVOLOG PEN) 100 UNIT/ML pen Inject 1-8 Units Subcutaneous daily (with breakfast)  Qty: 15 mL, Refills: 0    Associated Diagnoses: Inadequately controlled diabetes mellitus (H)      !! insulin aspart (NOVOLOG PEN) 100 UNIT/ML pen Inject 1-8 Units Subcutaneous daily (with lunch)  Qty: 15 mL, Refills: 0    Associated Diagnoses: Inadequately controlled diabetes mellitus (H)      !! insulin aspart (NOVOLOG PEN) 100 UNIT/ML pen Inject 1-8 Units Subcutaneous daily (with dinner)  Qty: 15 mL, Refills: 0    Associated Diagnoses: Inadequately controlled diabetes mellitus (H)      insulin pen needle (32G X 4 MM) 32G X 4 MM miscellaneous Use as directed by provider Per insurance coverage  Qty: 100 each, Refills: 0    Associated Diagnoses: Inadequately controlled diabetes mellitus (H)      Lidocaine (LIDOCARE) 4 % Patch Place 1 patch onto the skin every 24 hours To prevent lidocaine toxicity, patient should be patch free for 12 hrs daily.  Qty: 30 patch, Refills: 0    Associated Diagnoses: Intraparenchymal hemorrhage of brain (H)      mirtazapine (REMERON) 15 MG tablet Take 1 tablet (15 mg) by mouth at bedtime  Qty: 30 tablet, Refills: 0    Associated Diagnoses: Intraparenchymal hemorrhage of brain (H)      ramelteon (ROZEREM) 8 MG tablet Take 1 tablet (8 mg) by mouth at bedtime  Qty: 30 tablet, Refills: 0     Associated Diagnoses: Intraparenchymal hemorrhage of brain (H)       !! - Potential duplicate medications found. Please discuss with provider.        CONTINUE these medications which have CHANGED    Details   amLODIPine (NORVASC) 10 MG tablet Take 1 tablet (10 mg) by mouth daily  Qty: 30 tablet, Refills: 0    Associated Diagnoses: Essential hypertension      carvedilol (COREG) 12.5 MG tablet Take 1 tablet (12.5 mg) by mouth 2 times daily (with meals)  Qty: 60 tablet, Refills: 0    Associated Diagnoses: Essential hypertension      enoxaparin ANTICOAGULANT (LOVENOX) 40 MG/0.4ML syringe Inject 0.4 mLs (40 mg) Subcutaneous every 24 hours  Qty: 24 mL, Refills: 0    Associated Diagnoses: Atrial fibrillation, unspecified type (H)      insulin glargine (LANTUS PEN) 100 UNIT/ML pen Inject 7 Units Subcutaneous every 24 hours  Qty: 15 mL, Refills: 0    Comments: If Lantus is not covered by insurance, may substitute Basaglar or Semglee or other insulin glargine product per insurance preference at same dose and frequency.    Associated Diagnoses: Inadequately controlled diabetes mellitus (H)      irbesartan (AVAPRO) 300 MG tablet Take 1 tablet (300 mg) by mouth at bedtime  Qty: 30 tablet, Refills: 0    Associated Diagnoses: Essential hypertension      isosorbide mononitrate (IMDUR) 60 MG 24 hr tablet Take 1 tablet (60 mg) by mouth every evening  Qty: 30 tablet, Refills: 0    Associated Diagnoses: Essential hypertension      tamsulosin (FLOMAX) 0.4 MG capsule Take 1 capsule (0.4 mg) by mouth every morning  Qty: 30 capsule, Refills: 0    Associated Diagnoses: Intraparenchymal hemorrhage of brain (H)           CONTINUE these medications which have NOT CHANGED    Details   acetaminophen (TYLENOL) 325 MG tablet Take 2 tablets (650 mg) by mouth every 6 hours as needed for mild pain or fever  Refills: 0    Associated Diagnoses: Pain      Continuous Blood Gluc  (DEXCOM G6 ) JOSE Dexcom G6    USE EACH WITH 10 DAYS  SENSORS      INSULIN PUMP - OUTPATIENT Med[employer]onic device with no CGM and site change every 3 days   Sensitivity factor (midnight to midnight): 55  Bolus (units:gram): 6312-2582 = 1:9, 7464-8803 = 1:7, 1063-2180 = 1:7, 6887-3596 = 1:9, 2627-7317 = 1:13  Basal (units/hour): 3917-5871 = 0.45, 6269-2132 = 0.5, 5068-0455 = 0.625, 1513-6672 = 0.6, 4288-7646 = 0.45      !! melatonin 10 MG TABS tablet Take 1 tablet (10 mg) by mouth nightly as needed for sleep    Associated Diagnoses: Insomnia, unspecified type      !! melatonin 10 MG TABS tablet Take 1 tablet (10 mg) by mouth at bedtime    Associated Diagnoses: Persistent insomnia       !! - Potential duplicate medications found. Please discuss with provider.        STOP taking these medications       cefTRIAXone (ROCEPHIN) 1 GM vial Comments:   Reason for Stopping:         glucose 40 % (400 mg/mL) gel Comments:   Reason for Stopping:         insulin lispro (HUMALOG) 100 UNIT/ML Cartridge Comments:   Reason for Stopping:         insulin lispro (HUMALOG) 100 UNIT/ML Cartridge Comments:   Reason for Stopping:         insulin lispro (HUMALOG) 100 UNIT/ML Cartridge Comments:   Reason for Stopping:         insulin lispro (HUMALOG) 100 UNIT/ML Cartridge Comments:   Reason for Stopping:         insulin lispro (HUMALOG) 100 UNIT/ML vial Comments:   Reason for Stopping:         insulin lispro (HUMALOG) 100 UNIT/ML vial Comments:   Reason for Stopping:         senna-docusate (SENOKOT-S/PERICOLACE) 8.6-50 MG tablet Comments:   Reason for Stopping:         sodium chloride, PF, 0.9% PF flush Comments:   Reason for Stopping:         traMADol (ULTRAM-ER) 100 MG 24 hr tablet Comments:   Reason for Stopping:                 DISCHARGE INSTRUCTIONS AND FOLLOW UP  Discharge Procedure Orders   Home Care Referral   Referral Priority: Routine: Next available opening Referral Type: Home Health Therapies & Aides   Number of Visits Requested: 1     Reason for your hospital stay   Order Comments:  Rehabilitation following stroke     Activity   Order Comments: Your activity upon discharge: Up with assistance only     Order Specific Question Answer Comments   Is discharge order? Yes      Diet   Order Comments: Follow this diet upon discharge: Orders Placed This Encounter      Snacks/Supplements Adult: Glucerna; With Meals      Snacks/Supplements Adult: Other; 2 pkts saltine crackers + 2 slices cheddar cheese (10 g CHO) at 10 am; Between Meals      Snacks/Supplements Adult: Other; Cottage cheese + peaches (17 g CHO) at 2 pm; Between Meals      Room Service      Combination Diet Moderate Consistent Carb (60 g CHO per Meal) Diet; Regular Diet; Thin Liquid     Order Specific Question Answer Comments   Is discharge order? Yes           Physical Exam    Most recent Vital Signs:   Vitals:    01/24/24 0614 01/24/24 0714 01/24/24 0807 01/24/24 1608   BP: 137/68 127/68 127/68 129/57   BP Location: Right arm Right arm  Right arm   Patient Position:    Supine   Cuff Size:    Adult Regular   Pulse: 59 72 72 62   Resp: 16   16   Temp: 97.8  F (36.6  C)   97.8  F (36.6  C)   TempSrc: Oral   Oral   SpO2: 97% 92%  92%   Weight:       Height:           General: in no acute distress, conversational and alert, resting in bed   HEENT: atraumatic, nares clear, conjunctiva white, oral mucosa dry  Pulmonary: on room air, symmetrical chest rise  Cardiovascular: RRR, no murmur auscultated, well-perfused peripherally  Abdominal: soft, non-tender to palpation, non-distended  Extremities: warm peripherally, no edema in BLEs  Skin: warm, dry, without erythema, ecchymosis, or rash noted  MSK/Neuro: Moves all 4 extremities volitionally and against gravity. A&O X3. Able to listen to and follow commands appropriately. Speech coherent and comprehensible      Assessment &Plan    Tc Garcia is a 83 year old right hand dominant male with a PMH of DM2 with DKA, paroxysmal atrial fibrillation formerly on apixaban, hypertension, hyperlipidemia,  pelural effusion, CKD stage 3, BPH, and ACD who sustained a left cerebellar intracranial hemorrhage from a fall on 12/11/23 with a possible left medial frontal lobe infarct on imaging.  His deficits include decreased visual acuity and mild left hemiparesis. He was admitted to acute inpatient rehabilitation on 12/27/23.     Impairment group code: 02.22 Traumatic, Closed Injury; Fall with resulting intracranial hemorrhage           Impairment of ADL's: Continue with home OT     Impairment of mobility:  Continue with home PT     Impairment of cognition/language/swallow:  Continue with home SLP    #Traumatic brain injury  #Intraparenchymal hematoma    #Mild hydrocephalus   #H/o spontaneous intracranial hemorrhage (1/2023)  paroxysmal atrial fibrillation (on eliquis prior to admission)  Presented with dizziness diplopia and nausea. Workup found 1.5 X 3 cm intraparenchymal hematoma centered in inferior cerebellar vermis with likely extension into fourth ventricle; no hydrocephalus. Etiology ruled likely hypertensive in setting of chronic anticoagulation after fall.    - follow up with stroke neurology 6-8 weeks from 12/27  --Had left hand weakness on 12/31 and stat head CT showed no new intracranial abnormalities  -Continue PT, OT, SLP with home care         #Chronic C7 compression fracture  #Mid thoracic Back pain  No dynamic instability of C7 fracture  -Continue tylenol and lidocaine patches for pain relief  -Follow up with PCP         #Chronic diastolic CHF  #Atrial fibrillation PTA on Eliquis  #Hypertension  #Hyperlipidemia  Previously on apixaban, held after ICH. Anticoagulation was reversed and held. Echocardiogram with EF of 60 to 65%.  Severe biatrial enlargement.  Mild to moderate TR.  - lovenox ppx  - stroke neurology in 6-8 weeks  - BP goal systolic 130-150.   - continue amlodipine  - continue carvedilol  - continue avapro  - hold PTA demadex  - follow up with cardiology in 1 month to discuss Watchman         #Diabetic ketoacidosis, resolved  #Diabetes mellitus type II HgbA1c 8.1% 12/12/23  Patient's insulin pump had been on hold since admission in the setting of [facility]. Blood sugars labile during hospital stay. Patient went into DKA on 12/24/2023.  Likely triggered by infection with rise in the WBC count hence pump discontinued and patient switched to insulin drip per DKA protocol. DKA resolved on 12/25/2023, but then he had another episode of likely DKA on morning of 12/28/23 which may have been due to patient confusion about use of pump / injectable insulin  - Endo consulted and signed off. 1/02, restarted PTA insulin pump, but due to cognitive impairments to properly and safely administer insulin, giving excess or too little insulin, he was taken off of the insulin pump and placed on basal lantus and carb coverage novolog.   Insulin Regimen  -  Lantus 7 unit(s) q24 hours at 1800 hrs  -  1/18 Increased Novolog to 1 unit(s) per 8 g cho breakfast, per primary service: 1:13 g cho for lunch  1:14 for dinner a  Cover all intake.                 -  Novolog custom 1/65 resistance TID AC/HS and 0200                  -  BG TID AC/HS and 0200        #Adjustment Disorder   Patient is feeling down and hopless with what he feels is little progression functionally. Reporting he is worried about his health status and what will happen to his wife if he is unable to care for her.   -Clinical Psychology consult placed to evaluate and treat  -Continue Remeron         #Insomnia, improving  -1/08 continue remelteon and melatonin  -1/16, starting remeron for mood but will also help with sleep, will continue         #Possible aspiration pneumonia.  Agitation, leukocytosis on 12/12 to 12/13. No growth blood cultures.  CXR R > L lower lung opacity.  Completed IV ceftriaxone - cefuroxime course.  - CXR in ~ 4 weeks from ARU admission. 1/23 4 week follow up CXR shows stable small left pleural effusion versus pleural  thickening and slightly  improved left greater than right central and  bibasilar atelectasis/scarring. No new focal airspace opacity.  - Incentive spirometry  - aspiration precautions  -PCP to follow up     #Onychomycosis  -Podiatry saw and debrided all 10 toenails. Will reconsult if any new concerns arise.      #Presumed sepsis likely due to UTI  Patient went into DKA with rising white count and mildly positive UA.  Of note, patient had just completed treatment for blood pneumonia UTI, question if this was not completely treated.  Urine culture with no growth. Started on vanc/zosyn and narrowed to ceftriaxone 12/27/23  - Completed 7 day total course of abx with ceftriaxone on 1/2/24        #CKD Stage 3  Baseline creatinine 1-1.1 PTA, suspect this has changed  - continue irbesartan  - consider readding torsemide prn for edema  -PCP to follow up     #Anemia of chronic disease   -01/22 Hgb stable at 8.6  -PCP to follow up         Follow Up Appointments  Primary care provider, Jessika Cordoba in 1-2 weeks from discharge  Stroke neurology in 6-8 weeks from 12/27/23  cardiology in 1 month from 12/27/23  4. Diabetes follow up: Dr Asif at [facility] of Endocrinology       Patient was evaluated on day of discharge by attending physician, Charlie Paez DO, who agrees with plan of care.    Discharge summary was forwarded to Jessika Cordoba (PCP) at the time of discharge, so as to bridge from hospital to outpatient care.     It was our pleasure to care for Tc Garcia during this hospitalization. Please do not hesitate to contact me should there be questions regarding the hospital course or discharge plan.      [place] DO  PGY2  Physical Medicine and Rehabilitation-[facility]  Pager: [phone]

## 2024-01-24 NOTE — PLAN OF CARE
Discharge Planner Post-Acute Rehab PT:     Discharge Plan: home with hired in help, alone periodically.     Precautions: Alarms    Current Status:  Bed Mobility: Supervision  Transfer: CGA/ SBA FWW  Gait: CGA/-200' FWW  Stairs: CGASBA B rail  Balance: Able to stand w/o support - slight posterior lean, braces against objects w/ transfers  Fall Class completed:  1/6/24    Outcome Measures:   Ibarra 1/1 = 20/56  Ibarra 1/8 = 24/56  Ibarra 1/17 = 27/56    10MWT - Comfortable Pace  - .35 m/s on 1/8                - .42 m/s on 1/16    Assessment: Pt demonstrated increased fatigue and shuffling feet at beginning today, improved as session continued.     Other Barriers to Discharge (DME, Family Training, etc):   Caregiver role for his spouse  Cognition limiting home safety

## 2024-01-24 NOTE — PLAN OF CARE
Goal Outcome Evaluation:    [id number]  Patient alert but can be confused and forgetful.   Patient reported feeling disapointed that discharge date was pushed back.   CXR completed this shift. Provider aware of results.   Vital signs this shift B/P: 130/65, T: 97.3, P: 58, R: 16   Patient is able to make needs known, uses the call light appropriately. Continue with the plan of care.   .

## 2024-01-24 NOTE — PLAN OF CARE
Discharge Planner Post-Acute Rehab SLP:      Discharge Plan: TBD - [facility] for pt and spouse vs HH ?     Precautions: Fall, alarms     Current Status:  Hearing: WFL  Vision: Glasses  Communication: WFL  Cognition: WNL per CLQT. Moderate reasoning, mild attention and memory deficits during tasks.   Swallow: regular, thin (0) liquid. NO STRAWS      Assessment:  Instructed pt in moderate level reasoning/error awreness task, pt required mild cues from SLP to complete with 100% accuracy.     Other Barriers to Discharge (Family Training, etc): family training: IADL assist

## 2024-01-24 NOTE — PLAN OF CARE
Goal Outcome Evaluation:    Patient alert but is intermittently disoriented to time and place, reorientation effective. Calls to make needs known this shift. No impulsiveness. SpO2 checked 89-90% on RA, denies SOB, placed on O2 2L via NC, SpO2 went up to 93-94%. L PIV present. No pain or discomfort reported overnight. Continent of bladder using urinal/toilet. No BM made. BG was 129. Fall precautions maintained, call light in reach, safety checks completed. Bed alarm on for safety.    Continue with POC.

## 2024-01-24 NOTE — PLAN OF CARE
Goal Outcome Evaluation:      Patient is alert and oriented x4. Able to make needs known. Pt denied SOB, N/V and chest pain. Pt is blood sugar check with insulin given as ordered. Wound care and dressing changed on sacrum. Performed teaching with pt and future care coordinator on insulin and Lovenox administration. Pt is continent of bowel and bladder; and assist of 1 with walker and gait belt. Call light within reach.    Patient's most recent vital signs are:     Vital signs:  BP: 127/68  Temp: 97.8  HR: 72  RR: 16  SpO2: 92 %     Patient does not have new respiratory symptoms.  Patient does not have new sore throat.  Patient does not have a fever greater than 99.5.

## 2024-01-24 NOTE — PROGRESS NOTES
PSYCHOLOGY PROGRESS NOTE    Attempted to see Tc today for follow-up health psychology visit. Was informed by SW that patient was involved in discharge planning and unable to engage with psychology at this time. Will attempt follow-up visit at a later time.    Padmini Westfall, PhD, LP  Pager: [phone]

## 2024-01-24 NOTE — PROGRESS NOTES
8:30am--Received an email from pt son late last night stating the following:     Hi - thanks for that.  I am embarrassed to bother you about every detail, but was unsure how important a specific bed type was.  Jose Luis (Camille's ) went to their house just now and moved a twin bed from upstairs to the first floor and got it all set up (moved chairs out the family room to make space and we will also clear out the coffee table to make it even safer).  It looks great.  We are also ordering a safety bar from [employer] (only need one aide, as the other side of the bed is against a bookshelf).  We will send pics of the setup - here is a link to the safety bar I found.     The link to the bed rail looks standard and no concerns. Complimented pt son on getting that in place before discharge home. Will meet with Autumn today at 12pm as planned. Spoke with Charge RN. Charge RN will updated bedside RN and go over discharge medications.   -------------------------------------------------------------------------------  1:30pm--Spoke with Sanna on the phone before noon. Sanna continues to work on retirement placement and plans to call pt this afternoon to discuss a location with opening. Sanna will set up a RN visit at pt home and coordinate further. Spoke with pt son Maxx on the phone. Discussed updates, details of HC set up, caregiver schedule, and transportation/assistance to OP apts at D/C. Maxx appreciative and in agreement with all discharge plans/needs that are being put into place. Denied additional questions. Appreciative of updates.     Met with Autumn and pt in pt room around 12:15pm, as planned. Confirmed plan and details of plan listed below. Pt aware and expressed appreciation and agreement. Discussed Life Alert with pt. Pt denied having one and stated that his family was looking into it. BERTHA made a plan to email some resources to the family. Autumn and BERTHA updated pt on getting a bed set up on the main level. Discussed  recommendation that someone, besides his wife, be there with pt anytime he is going upstairs (has a stair lift) and/or showering. Pt expressed agreement. W/c ordered and pt will go home with w/c. Pt has a shower chair at home already. While meeting with pt and Autumn, BERTHA emailed pt son about groceries in the home. Pt son confirmed that there are groceries in the home, CELESTINE will go to the home this evening to get anything additional, and cleaning company went out yesterday and cleaned the home. SW, pt, and Autumn continued to discussed some additional information (below). Sia OT came in to also touch base. Bedside RN Gordon also went over pt discharge medications with Autumn.     Pt will discharge home tomorrow, Thurs 01/25/24. Caregiver will come tomorrow at noon. Caregiver will go over discharge ppwk with the RN and transport pt home. Pt will be sent home with a few days worth of pull-ups, chucks, and wiptes and Charge RN updated. PCS will help set up the med box before discharge, since the caregiver is unable to do that. All caregiver shifts filled. Called [facility] (spoke with Rody, direct number below) and provided update. Confirmed all services (RN, PT, OT, SLP, HA, and BERTHA). Confirmed that SW does not need to fax orders and that they have access to EPIC. Quick SOC requested. Rody confirmed RN SOC Fri 01/26/24. Rody updated on pt situation and Rody provided direct contact for Maria Guadalupe (with the HC agency and listed below) who will be coordinating pt care and services. Sent an email to pt son Maxx, two dtrs, CELESTINE, Sanna, and Autumn (all listed below) with update on meeting with Autumn, plan for discharge tomorrow, HC contact information and SOC details, and resources on meals on wheels, mom's meals, transportation list, and life alert.     Team updated during huddle. BERTHA called and left a vm for [facility] [facility] RN with update on discharge and this writer information if additional  questions or concerns arise. Will meet with pt before discharge to complete IRF and IMM. No other concerns or needs identified at this time.     This writer called the [facility]  (listed below) and left vm.     SW will follow-up with [facility] , follow up with family and care team to be sure all other details in place, confirm family contact for HC to arrange SOC (family asked), will confirm that pt has a POA in place (family asked), will help family in having sign SOCO for financial assistance (with MA application and LTC planning), will complete IRF and IMM with pt, and will update PCP and PCP CC before discharge home.     Will remain available and continue to follow.     ADDENDUM: Per dtr Camille, pt DOES have aPOA and Camille will be the primary contact for SOC. Called Maria Guadalupe with [facility]. Provided update and a long background on pt situation. Marcelino will call dtr tomorrow to confirm SOC. Maria Guadalupe will reach out to this writer if additional needs arise.   ---------------------------------------------------------------------------------------------------  Pt diana Lilly (lives in [facility], 14 hour time difference):  PH: [phone] (skype call)  E: [email or web address]      Pt dtr--Brittani Sandy, E: [email or web address]   Pt dtr--Camille Ji, E: [email or web address] (local)  Pt son-in-law--Jose Luis Ji E: [email or web address]     Home Health Care:   [facility]   PH: [phone]  Fax: [phone]  RN, PT, OT, SLP, SALOMON, and SW   Rody PH: [phone] (updated 01/24/24)  Maria Guadalupe PH: [phone] (will be coordinating care/schedule)      [facility] (coordinating jail placement and financial planning):  Landry Quezada  PH: [phone]  E: [email or web address]     Autumn hensley/ Deonna Etienne (coordinating caregiver services):   Autumn Office: [phone] or Cell: [phone]  E: [email or web address]     VA Report: [id number]  (Made by [employer])  Adult protection Yael PH: [phone]    [facility] :   [phone] (phone)  [phone] (fax)  Jessika murillo [facility]  ---------------------------------------------------------------------------------------------------    KENJI Castorena   [facility]   Direct Phone: [phone]  I   Pager: [phone]  I  Fax: [phone]

## 2024-01-25 NOTE — PROGRESS NOTES
Occupational Therapy Discharge Summary    Reason for therapy discharge:    Discharged to home with home therapy.    Progress towards therapy goal(s). See goals on Care Plan in [facility] electronic health record for goal details.  Goals partially met.  Barriers to achieving goals:   discharge from facility and impaired balance and cognition.    Therapy recommendation(s):    Continued therapy is recommended.  Rationale/Recommendations:  Recommend HC OT services to complete home safety assessment, and to increase IND and safety with ADLs/IADLs and functional mobility in the home.    Currently, pt requires CGA with walker due to intermittent posterior leaning. Pt is able to propel w/c around the home when home care aid is not present. Pt requires CGA with walker standing at sink for grooming tasks, and set-up when seated. Pt requires set-up with UBD and CGA with LBD tasks with walker. Pt requires SBA with footwear seated. Pt preforms dressing tasks with less assistance when seated in chair for trunk support. Pt requires CGA with shower transfers with use of tub bench and grab bars, and assistance to wash/rinse/dry 4/10 body parts. Pt requires CGA with toilet transfers with walker, and min A with toilet hygiene with walker d/t difficulty with varghese-cares. Recommending assistance with all IADLs d/t impaired cognition. Pt has shower chair to utilize for bathing, and will have assistance with ADLs from home care agency.     Plan for pt discharge is to return home with intermittent help for ADLs/IADLs and mobility from home care agency. A bed has been placed on the first for increased IND and safety. Family has ordered a bed rail to increase IND with bed mobility, and pt owns shower chair. Home care has been set up with a caregiver schedule put in place, as well as transportation/assistance to OP appointments. Family to purchase life alert. Recommending pt to live on main floor and only use stair lift with Home care.

## 2024-01-25 NOTE — PLAN OF CARE
Goal Outcome Evaluation:      Plan of Care Reviewed With: patient    Overall Patient Progress: improvingOverall Patient Progress: improving    Patient is alert and oriented with some confusion and forgetfulness. Able to use a call light appropriately. PIV to the left arm was taken out. There was some bleeding but was well managed. Patient will be discharging home tomorrow. Bgs were at 388 at HS, sliding scale was given as ordered. Denies pain but has lidocaine patch to the lower back scheduled. Call light within reach. Nursing staff will continue with POC.

## 2024-01-25 NOTE — PROGRESS NOTES
Speech Language Therapy Discharge Summary    Reason for therapy discharge:    Discharged to home with home therapy.    Progress towards therapy goal(s). See [facility] on Care Plan [facility] for goal details.  Goals partially met.  Barriers to achieving goals:   discharge from facility.    Therapy recommendation(s):    Continued therapy is recommended.  Rationale/Recommendations:  Ongoing  SLP for cognitive-linguistic interventions, strategy development and training.

## 2024-01-25 NOTE — PLAN OF CARE
FOCUS/GOAL  Medical management    ASSESSMENT, INTERVENTIONS AND CONTINUING PLAN FOR GOAL:  Pt is alert and oriented, forgetful at times. Use urinal at bedside. Denies pain. BG at 2 am was 218 mg/dl, insulin coverage given. Activated bed alarm when he sat by the edge of the bed. He forgot to use the call light for needs. CGA1 gaitbelt and walker for assistance. Continent of bladder. Min assist w/ dressing up. Will be discharging today.  Goal Outcome Evaluation:

## 2024-01-25 NOTE — PROGRESS NOTES
Met with pt. Completed IRF questions and IMM. Went through SOCO from [employer] (helping with MA), pt signed, and form scanned into email. Completed HCD with pt. Scanned/emailed the document to [facility] [employer]. Gave pt the hard copy and 3 additional copies.     Emailed pt son, two dtrs, Jose Luis (son-in-law), Sanna, & Autumn the HCD, SOCO, and discharge orders. Also updated them that incontinent supplies are not covered by medicare and where to get them from. Informed everyone that a supply of incontinent supplies and urinals are being sent home with pt. Med boxes are being set up by the Charge RN and extensive education on insulin and lovenox completed with pt over the last few days. Autumn and bedside RN went over discharge medications yesterday. Caregiver coming to  pt this afternoon. HC aware and planning to call Camille (dtr) later today to confirm the time. Per HC, now planning SOC on Sat 01/27/24. Family notified. Per Sanna, pt is agreeable to the shelter placement that was located and can accept. Sanna sent the application ppwk (for family to fill out) and virtual tour information sent to this long chain email. Sanna has coordinated an in-person RN assessment with pt at home tomorrow, 01/26/24 to help facilitate and coordinate an eventual shelter placement. Pt is aware and in agreement with the plan. Last night pt diana Lilly emailed this chain email and reiterated that pt's CELESTINE Jose Luis stocked up on groceries, completed getting the bed on the main level set up, confirmed that the cleaning service did a deep clean of the home, and Jose Luis also cleared the main level of any obstacles. W/c delivered to bedside and pt will take that home with him. Shower chair already at home. As documented yesterday, pt aware NOT to use the stair lift to go upstairs, unless someone (beside his wife) is at home to assist him. Pt will has hired caregivers 2x/day for 4hr/shift. Pt's AM caregiver is an OT at baseline. No need to fax  "orders to HC as they have EPIC access. Referral to PCP CC referral completed with pt and pt family permission.     No additional SW needs at this time. All contact information listed below.     IRF-ISAK Pain Assessment  Pain Effect on Sleep  \"Over the past 5 days, how much of the time has pain made it hard for you to sleep at night?\"    0. Does not apply - I have not had any pain or hurting in the past 5 days  ----------------------------------------------------------------------------------------------------------------------------  Pt son--Maxx (lives in [facility], 14hr difference) PH: [phone] (skype call), E: [email or web address]   Pt dtr--Brittani Sandy, E: [email or web address]   Pt dtr--Camille Ji, E: [email or web address] (local)  Pt son-in-law--Jose Luis Ji, E: [email or web address]     Home Health Care:   [facility]   PH: [phone]  Fax: [phone]  RN, PT, OT, SLP, SALOMON, and SW   Rody PH: [phone] (updated 01/24/24)  Maria Guadalupe PH: [phone] (will be coordinating care/schedule)      [facility] (coordinating [facility] placement and financial planning):  Landry Quezada  PH: [phone], E: [email or web address]     Autumn hensley/ Deonna Etienne (coordinating caregiver services):   Autumn Office: [phone] or Cell: [phone], E: [email or web address]     VA Report: [id number] (Made by [employer])  Adult protection , Yael PH: [phone]  *Updated yesterday 01/24/24, case NOT regarding pt but pt wife, no concerns or needs from this writer.      [employer] UR RN Case Manager:   Jessika   PH: [phone]   Fax: [phone]   ----------------------------------------------------------------------------------------------------------------------------    [facility]   Direct Phone: [phone]  I   Pager: [phone]  I  Fax: [phone]    "

## 2024-01-25 NOTE — PLAN OF CARE
Goal Outcome Evaluation:       pt alert and oriented with some confusion. Assist x1 with walker. Continent of both and uses urinal appropriately. Denied pain. Patient discharged today with his medication. Discharge instructions completed  with patient

## 2024-01-25 NOTE — CARE PLAN
Physical Therapy Discharge Summary    Reason for therapy discharge:    Discharged to home.    Progress towards therapy goal(s). See goals on Care Plan in [facility] electronic health record for goal details.  Goals met    Therapy recommendation(s):    Continued therapy is recommended.  Rationale/Recommendations:  Home health PT. Pt currently CGA with transfer and gait, uses w/c independently. Supervision recommended, pt has hired in help. Ordered w/c from [employer].     Ibarra 1/17 = 27/56   10MWT 1/16 = Comfortable Pace - .42 m/s

## 2024-01-29 NOTE — TELEPHONE ENCOUNTER
MTM referral from: Transitions of Care (recent hospital discharge or ED visit)    MTM referral outreach attempt #1 on January 29, 2024 at 9:47 AM      Outcome: Patient is not interested at this time, will route to MTM Pharmacist/Provider as an FYI. Thank you for the referral.     Use [facility] part d  for the carrier/Plan on the flowsheet        Ronda Walters - [facility]

## 2024-01-29 NOTE — TELEPHONE ENCOUNTER
[facility]    The following order faxed to [facility] (Fax: [phone]) per VIPIN Almeida.  Attached today's dictation note.     Start Furosemide 20 mg daily        Future Appointments   Date Time Provider Department Center   2/2/2024  3:00 PM Kaya Brenner PA-C [facility]    2/13/2024  3:45 PM Barney Dubois MD [facility]   3/1/2024  1:30 PM [facility]   3/1/2024  2:30 PM Ame Mejía [facility]-C [facility]       Annette Mills RN BSN   [facility] [place] [place]  [facility] Care Coordinator  01/29/24, 2:36 PM

## 2024-01-29 NOTE — ED TRIAGE NOTES
[facility]  ED Arrival Note    PT BIBA from home after fall and head injury. Reportedly pt went to sit on the chair and lost his balance, striking back of head on a brick wall. Denies  LOC. On Eliquis for A-fib. Recently at a TCU for stroke rehab and has balance issues at baseline. Uses  a walker for mobility. Pt is Type 2 Diabetic and insulin Dependent.    EMS VS: SBP's 120's; A-fib w/ HR 70's bpm; >90% RA  EMS Interventions: C-Collar    Pre hospital glucose~ 232      Visitors during triage: None    Directed to: Fast track    Pronouns: he/him       Triage Assessment (Adult)       Row Name 01/28/24 1932          Triage Assessment    Airway WDL WDL        Respiratory WDL    Respiratory WDL WDL;rhythm/pattern     Rhythm/Pattern, Respiratory depth regular;pattern regular        Cardiac WDL    Cardiac WDL X;rhythm      Pulse Rate & Regularity radial pulse irregular     Cardiac Rhythm Atrial fibrillation         Peripheral/Neurovascular WDL    Peripheral Neurovascular WDL WDL        Cognitive/Neuro/Behavioral WDL    Cognitive/Neuro/Behavioral WDL WDL

## 2024-01-29 NOTE — PROGRESS NOTES
Cardiology Clinic Progress Note - [facility] (Heart Failure Specialty)    Tc Garcia MRN# [id number]   YOB: 1940 Age: 83 year old   Primary cardiologist: Dr. Julien         Assessment and Plan:     In summary, Tc Garcia presents to the [facility] clinic for follow up after a recent prolonged hospitalization for mechanical fall resulting in a recurrent intracranial hemorrhage with a possible left medial frontal lobe infarct, deficits including reduced visual acuity and mild left hemiparesis. Anticoagulation appropriately stopped as a result. He had another fall with head contusion just yesterday with benign imaging in the ED.    From a HF standpoint, he has peripheral edema with clear lungs and no complaints of dyspnea. BP is robust.     Plan:  - Message sent to Kaya Brenner (KAMILLA, neurology) regarding clearance for watchman implantation ([id number] score of 5) which would require intubation/sedation and short-term anticoagulation with long-term antiplatelet (aspirin) therapy. He sees her 2/2.   - Referral to Dr. Dubois for watchman consideration.   - Start furosemide 20 mg daily; see me in four weeks with a BMP prior. He will contact me if his leg swelling does not improve with this over the next week.     Ame Mejía PA-C  [facility] - Heart Clinic        History of Presenting Illness:      Tc Garcia is a pleasant 83 year old patient who presents today for a [facility] clinic follow up visit.     The patient has a complex history of the following -   # Chronic HFpEF, with multiple admissions in 2020. Enrolled in MHT with goal wt 155-163 lbs.  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 Has declined pulmonary rehab.   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 Anticoagulated with Eliquis.  # Poorly-controlled DMII  # HTN  # HLP  # CKD with variable renal function response to diuretics, follows with [facility].   # Anemia of chronic disease, requiring intermittent Fe infusions, followed by nephrology  # Obesity  # Hx of medication confusion and non-compliance, self-adjusts diuretics frequently.  # Social - Lives with wife, Radha. Sedentary. High sodium diet, medication noncompliance, some concern over cognitive function and medical literacy.  # DNR/DNI CODE STATUS.    Please see my Clinic note dated 11/12/2021 for Tc's detailed history.  In brief, Tc had multiple admissions in the summer 2020 for acute HFpEF and a reaccumulating pleural effusion.  Since then, he has been followed closely in the [facility] clinic, and has remained out of the hospital until March 2021 when he was admitted for dehydration after he had 9 teeth extracted.  His losartan, Lasix, and spironolactone were all held, and then slowly readministered.  However after that, his spironolactone was discontinued completely due to hyperkalemia.    Of note, his creatinine has been quite variable, ranging from 1.3 - 1.9 over the past year.  He is followed by nephrology.    Unfortunately, he has again had a recently tumultuous course as detailed below.    In January 2023, the patient was admitted to [facility] several times. He presented initially on 1/15/23 with weakness and altered mental status and was found to have nontraumatic intraventricular intracerebral hemorrhage in the setting of hypertension and anticoagulation with apixaban. He received Kcentra for anticoagulation reversal. He re-presented on 1/29/23 with very labile blood sugars going from the 600s to under 50 after treatment. He was also hypotensive initially in the setting of hypovolemia and had some issues with labile blood pressures  "during his stay as well. Several adjustments were made to medications, including discontinuation of apixaban. Irbesratan was decreased from 150 mg to 75 mg once daily. He was also previously on lasix 40 mg once daily as well as amlodipine at 10 mg once daily and these were both stopped.    He was later evaluated again in the Emergency Department on 2/11/2023 with \"seizure like activity\" - per chart review noted to have some stiffening but no extremity shaking and decreased level of consciousness with blood glucose at the time noted to be 33. Head CT showed resolving hemorrhage.   He was then admitted in April with diabetic ketoacidosis and ABBIE. He had some issues with worsening dyspnea while there. TTE during his stay showed stable preserved EF with moderately dilated RV, moderately severe TR and moderate-severe PHTN. He was placed back on Lasix at 20 mg daily. Additionally, neuro saw him and placed him back on Eliquis due to resolved hemorrhage. Note states they felt his prior CVA was related to suboptimally treated HTN.  There were of course medication compliance concerns, and per notes his daughter stated she would help with this going forward. It was strongly recommended that he discharged to an LTAC, but he stated he would rather die at home than go to an LTAC for stabilization.   Issues with volume overload after that d/t furosemide non-compliance.   In November, his furosemide was stopped due to ABBIE with diarrhea.  He saw my colleague Tracey who recommended urgent PCP evaluation in the setting of ongoing unintentional weight loss and urgent nephrology referral as well.  Then admitted at the [facility] December 2023 for a month after a fall resulted in a recurrent intracranial hemorrhage with a possible left medial frontal lobe infarct, resulting in reduced visual acuity and mild left hemiparesis.  Anticoagulation was stopped.  His stay was complicated by DKA.  He was discharged to inpatient rehabilitation and " "discharged from there 4 days ago.  Echocardiogram during his stay showed an EF of 60 to 65%, moderate RV dilation with normal systolic function, severe pulmonary hypertension, and mild to moderate TR.  He visited the ER yesterday for another mechanical fall during which he hit his head.  CT showed no intracranial hemorrhage and he was discharged home.     Today, Tc returns to clinic with his wife and son, whom I am just meeting today. They (and he) feel he's doing remarkably well for all he's been through. No real dyspnea, orthopnea, chest pain, palpitations, near-syncope. He remains off all diuretics. He does have considerable peripheral edema on exam, clear lungs, and in rate controlled AF. Previously, a good wt for him was ~145 lbs. Now he's at 147, but previously lost wt this past fall. Recent wt gain but they feel related to significantly improving appetite. Recent creat stable ~1.4. Now at [facility] -- Continues to live independently with his wife at baseline but is touring [facility] with his son so he can hopefully transition there on TCU discharge.          Review of Systems:     12-pt ROS is negative except for as noted in the HPI.          Physical Exam:     Vitals: BP (!) 148/78   Pulse 76   Ht 1.753 m (5' 9\")   Wt 66.7 kg (147 lb)   SpO2 98%   BMI 21.71 kg/m    Wt Readings from Last 10 Encounters:   01/29/24 66.7 kg (147 lb)   01/28/24 67.1 kg (148 lb)   01/20/24 65 kg (143 lb 4.8 oz)   12/28/23 62.6 kg (138 lb 0.1 oz)   12/27/23 62.6 kg (138 lb)   12/24/23 60.8 kg (134 lb 0.6 oz)   11/20/23 62.6 kg (138 lb)   11/16/23 60.8 kg (134 lb)   10/26/23 63.5 kg (140 lb)   10/14/23 60.8 kg (134 lb)       Constitutional:  Patient is pleasant, alert, cooperative, and in NAD.  HEENT:  NCAT. PERRLA. EOM's intact.   Neck:  CVP appears normal. No carotid bruits.   Pulmonary: Normal respiratory effort. CTAB  Cardiac: Irregularly irregular, variable S1, no S3/S4, grade 3/6 sm at the LSB.  Abdomen:  Non-tender abdomen, no " hepatosplenomegaly appreciated.   Vascular: Pulses in the upper and lower extremities are 2+ and equal bilaterally.  Extremities: 2+ pitting edema pretibial region R>L.  Skin:  No rashes or lesions appreciated.   Neurological:  No gross motor or sensory deficits.   Psych: Appropriate affect.        Data:     Labs reviewed:  Recent Labs   Lab Test 12/12/23  0543 10/14/23  0143 09/20/23  0946 05/26/23  0910 01/17/23  0436 01/15/23  1321 06/08/22  0853 03/17/21  0752 09/02/20  1044 08/10/20  1027 06/16/20  1313 05/13/20  0553 05/12/20  0541 05/11/20  0542   LDL 54  --   --   --  52  --   --   --   --   --   --  43  --   --    HDL 68  --   --   --  50  --   --   --   --   --   --  60  --   --    NHDL 69  --   --   --  62  --   --   --   --   --   --  56  --   --    CHOL 137  --   --   --  112  --   --   --   --   --   --  116  --   --    TRIG 76  --   --   --  51  --   --   --   --   --   --  65  --   --    TSH  --   --   --   --   --  3.11  --  0.94  --  4.75*  --   --    < >  --    NTBNP  --  2,963* 4,488* 3,829*  --   --    < >  --    < >  --    < >  --   --   --    IRON  --   --   --   --   --   --   --   --   --   --   --   --   --  16*   FEB  --   --   --   --   --   --   --   --   --   --   --   --   --  161*   IRONSAT  --   --   --   --   --   --   --   --   --   --   --   --   --  10*   ERNESTO  --   --   --   --   --   --   --   --   --   --   --   --   --  142    < > = values in this interval not displayed.       Lab Results   Component Value Date    WBC 6.9 01/22/2024    WBC 10.4 06/15/2021    RBC 3.14 (L) 01/22/2024    RBC 4.08 (A) 06/15/2021    HGB 8.8 (L) 01/22/2024    HGB 11.7 (A) 06/15/2021    HCT 28.1 (L) 01/22/2024    HCT 36.3 (A) 06/15/2021    MCV 90 01/22/2024    MCV 89 06/15/2021    MCH 28.0 01/22/2024    MCH 28.7 06/15/2021    MCHC 31.3 (L) 01/22/2024    MCHC 32.2 06/15/2021    RDW 18.7 (H) 01/22/2024    RDW 14.2 06/15/2021     01/22/2024     06/15/2021       Lab Results   Component  Value Date     01/22/2024     07/12/2021    POTASSIUM 4.0 01/22/2024    POTASSIUM 4.2 01/17/2023    POTASSIUM 4.0 07/12/2021    CHLORIDE 107 01/22/2024    CHLORIDE 105 01/17/2023    CHLORIDE 102 11/04/2021    CHLORIDE 100 07/12/2021    CO2 28 01/22/2024    CO2 25 01/17/2023    CO2 26 07/12/2021    ANIONGAP 7 01/22/2024    ANIONGAP 12 01/17/2023    ANIONGAP 4 03/18/2021     (H) 01/25/2024     (H) 01/17/2023     (A) 07/12/2021    BUN 24.0 (H) 01/22/2024    BUN 36 (H) 01/17/2023    BUN 18 07/12/2021    BUN 10 07/12/2021    CR 1.42 (H) 01/22/2024    CR 1.73 (A) 07/12/2021    GFRESTIMATED 49 (L) 01/22/2024    GFRESTIMATED 40 (L) 06/21/2021    GFRESTBLACK 46 (L) 06/21/2021    LLOYD 8.8 01/22/2024    LLOYD 9.3 07/12/2021      Lab Results   Component Value Date    AST 9 12/29/2023    AST 12 03/22/2021    ALT <5 12/29/2023    ALT 5 03/22/2021       Lab Results   Component Value Date    A1C 8.1 (H) 12/12/2023    A1C 7.7 (H) 11/20/2020       Lab Results   Component Value Date    INR 1.35 (H) 12/11/2023    INR 1.13 01/15/2023    INR 1.50 (H) 11/21/2020    INR 1.18 (H) 07/17/2020           Problem List:     Patient Active Problem List   Diagnosis    DM type 2, Hgb A1C 8.2 on 4/25/20    Mixed hyperlipidemia    Essential hypertension    Pain in limb    Plantar fascial fibromatosis    HYPERLIPIDEMIA LDL GOAL <100    Hemothorax on left    Recurrent Left Pleural effusion -- S/P Pleurex Cath 5/12/20    ABBIE (acute kidney injury) (H24)    Acute respiratory failure with hypoxia (H)    Pulmonary hypertension (H)    CRF (chronic renal failure), stage 3     Anemia due to chronic kidney disease    Atrial fibrillation/flutter -- on Eliquis and Amiodarone    DKA (diabetic ketoacidoses)    Anemia in CKD (chronic kidney disease)    Dyspnea    SOB (shortness of breath)    Non-recurrent bilateral inguinal hernia without obstruction or gangrene    Acute cholecystitis    Abdominal pain, generalized    Non-intractable  vomiting with nausea, unspecified vomiting type    Alcohol dependence (H)    CHF (congestive heart failure) (H)    Syncope and collapse    Weakness    Postoperative state    Acute kidney injury (H24)    Chronic diastolic heart failure (H)    Anticoagulated    Right-sided nontraumatic intraventricular intracerebral hemorrhage (H)    Dehydration    Hypoglycemia    Hypoxia    DNR (do not resuscitate)    Hypotension, unspecified hypotension type    Diabetic nephropathy associated with type 2 diabetes mellitus (H)    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, or unspecified(403.00)    Nephrotic range proteinuria    Secondary hyperparathyroidism of renal origin (H24)    Stage 3b chronic kidney disease (H)    Vitamin D deficiency    Diabetic ketoacidosis without coma associated with type 2 diabetes mellitus (H)    Intraparenchymal hemorrhage of brain (H)    Acute cystitis without hematuria    Constipation, unspecified constipation type    Pain in extremity, unspecified extremity    Type 2 diabetes mellitus with other specified complication, with long-term current use of insulin (H)    Nontraumatic intraventricular intracerebral hemorrhage, unspecified laterality (H)    Iron deficiency anemia    Intracranial hemorrhage (H)    Hyperglycemia    Inadequately controlled diabetes mellitus (H)           Medications:     Current Outpatient Medications   Medication Sig Dispense Refill    acetaminophen (TYLENOL) 325 MG tablet Take 2 tablets (650 mg) by mouth every 6 hours as needed for mild pain or fever  0    Alcohol Swabs PADS Use to swab the area of the injection or bina as directed Per insurance coverage 100 each 0    amLODIPine (NORVASC) 10 MG tablet Take 1 tablet (10 mg) by mouth daily 30 tablet 0    blood glucose (NO BRAND SPECIFIED) lancets standard To use to test glucose level in the blood Use to test blood sugar  4  times daily as directed. To accompany glucose monitor  brands per insurance coverage. 100 each 0    blood glucose (NO BRAND SPECIFIED) test strip To use to test glucose level in the blood Use to test blood sugar  4 times daily as directed. To accompany glucose monitor brands per insurance coverage. 100 strip 0    blood glucose monitoring (NO BRAND SPECIFIED) meter device kit Use as directed Per insurance coverage 1 kit 0    carvedilol (COREG) 12.5 MG tablet Take 1 tablet (12.5 mg) by mouth 2 times daily (with meals) 60 tablet 0    Continuous Blood Gluc  (DEXCOM G6 ) JOSE Dexcom G6    USE EACH WITH 10 DAYS SENSORS      glucose (BD GLUCOSE) 4 g chewable tablet Take 4 tablets by mouth every 15 minutes as needed for low blood sugar 50 tablet 0    insulin aspart (NOVOLOG PEN) 100 UNIT/ML pen Inject 1-10 Units Subcutaneous Take with snacks or supplements for high blood sugar 15 mL 0    insulin aspart (NOVOLOG PEN) 100 UNIT/ML pen Inject 1-7 Units Subcutaneous 3 times daily (before meals) 15 mL 0    insulin aspart (NOVOLOG PEN) 100 UNIT/ML pen Inject 1-5 Units Subcutaneous 2 times daily 15 mL 0    insulin aspart (NOVOLOG PEN) 100 UNIT/ML pen Inject 1-8 Units Subcutaneous daily (with breakfast) 15 mL 0    insulin aspart (NOVOLOG PEN) 100 UNIT/ML pen Inject 1-8 Units Subcutaneous daily (with lunch) 15 mL 0    insulin aspart (NOVOLOG PEN) 100 UNIT/ML pen Inject 1-8 Units Subcutaneous daily (with dinner) 15 mL 0    insulin glargine (LANTUS PEN) 100 UNIT/ML pen Inject 7 Units Subcutaneous every 24 hours 15 mL 0    insulin pen needle (32G X 4 MM) 32G X 4 MM miscellaneous Use as directed by provider Per insurance coverage 100 each 0    INSULIN PUMP - OUTPATIENT Medtronic device with no CGM and site change every 3 days   Sensitivity factor (midnight to midnight): 55  Bolus (units:gram): 5079-9182 = 1:9, 7168-2628 = 1:7, 3125-3692 = 1:7, 9172-7638 = 1:9, 5751-3933 = 1:13  Basal (units/hour): 5269-5934 = 0.45, 2556-4851 = 0.5, 1728-7034 = 0.625, 7539-9770 = 0.6,  1208-3812 = 0.45      irbesartan (AVAPRO) 300 MG tablet Take 1 tablet (300 mg) by mouth at bedtime 30 tablet 0    isosorbide mononitrate (IMDUR) 60 MG 24 hr tablet Take 1 tablet (60 mg) by mouth every evening 30 tablet 0    Lidocaine (LIDOCARE) 4 % Patch Place 1 patch onto the skin every 24 hours To prevent lidocaine toxicity, patient should be patch free for 12 hrs daily. 30 patch 0    melatonin 10 MG TABS tablet Take 1 tablet (10 mg) by mouth at bedtime      mirtazapine (REMERON) 15 MG tablet Take 1 tablet (15 mg) by mouth at bedtime 30 tablet 0    ramelteon (ROZEREM) 8 MG tablet Take 1 tablet (8 mg) by mouth at bedtime 30 tablet 0    tamsulosin (FLOMAX) 0.4 MG capsule Take 1 capsule (0.4 mg) by mouth every morning 30 capsule 0    enoxaparin ANTICOAGULANT (LOVENOX) 40 MG/0.4ML syringe Inject 0.4 mLs (40 mg) Subcutaneous every 24 hours (Patient not taking: Reported on 1/29/2024) 24 mL 0           Past Medical History:     Past Medical History:   Diagnosis Date    Anemia     Anemia of chronic disease 5/14/2020    Back pain     CKD (chronic kidney disease) stage 3, GFR 30-59 ml/min (H)     CRF (chronic renal failure), stage 3  5/14/2020    Fall 11/2019    suffered multiple left rib fractures, C3 and T2 fractures    Mixed hyperlipidemia 7/7/2004    Paroxysmal atrial fibrillation (H)     Personal history of colonic polyps 1972    gets colonoscopy every five years, due in 2006    Pleural effusion on left 11/2019    after multiple rib fractures    Pulmonary hypertension (H) 5/10/2020    Added automatically from request for surgery [id number]    Recurrent Left Pleural effusion -- S/P Pleurex Cath 5/12/20 12/30/2019    Rosacea 1989    Type II or unspecified type diabetes mellitus without mention of complication, not stated as uncontrolled 1999    Unspecified essential hypertension 1994     Past Surgical History:   Procedure Laterality Date    ANESTHESIA CARDIOVERSION N/A 2/3/2020    Procedure: ANESTHESIA, FOR CARDIOVERSION;   Surgeon: GENERIC ANESTHESIA PROVIDER;  Location:  OR    CV RIGHT HEART CATH MEASUREMENTS RECORDED N/A 2020    Procedure: Right Heart Cath;  Surgeon: Senthil Silva MD;  Location:  HEART CARDIAC CATH LAB    HC REMOVE TONSILS/ADENOIDS,<11 Y/O      T & A <11y.o.    IR CHEST TUBE DRAIN TUNNELED LEFT  2020    IR CHEST TUBE PLACEMENT NON-TUNNELLED LEFT  2020    IR CHEST TUBE REMOVAL NON TUNNELED LEFT  2020    IR CHEST TUBE REMOVAL TUNNELED LEFT  2020    LAPAROSCOPIC CHOLECYSTECTOMY N/A 2020    Procedure: CHOLECYSTECTOMY, LAPAROSCOPIC;  Surgeon: Annette Lambert MD;  Location:  OR    LAPAROSCOPIC HERNIORRHAPHY INGUINAL BILATERAL Bilateral 10/27/2020    Procedure: LAPAROSCOPIC BILATERAL INGUINAL HERNIA REPAIR WITH MESH;  Surgeon: Brian Garsia MD;  Location:  OR     Family History   Problem Relation Age of Onset    Family History Negative Mother          age 71    Family History Negative Father          age 70    Diabetes Brother         alive age 77    Diabetes Brother         alive age 76    Family History Negative Brother             Family History Negative Brother             Diabetes Sister         alive age76    Family History Negative Sister          age 86    Heart Disease Sister             Family History Negative Sister             Family History Negative Sister             Diabetes Sister     Diabetes Sister     Diabetes Brother     Diabetes Brother      Social History     Socioeconomic History    Marital status:      Spouse name: Lianna    Number of children: 4    Years of education: 16    Highest education level: Not on file   Occupational History    Occupation: [employer]     Employer: [employer]    Tobacco Use    Smoking status: Former     Packs/day: 0.20     Years: 40.00     Additional pack years: 0.00     Total pack years: 8.00     Types: Cigarettes     Start date:  1968     Quit date: 2008     Years since quittin.0    Smokeless tobacco: Never   Vaping Use    Vaping Use: Never used   Substance and Sexual Activity    Alcohol use: Not Currently     Alcohol/week: 35.0 standard drinks of alcohol    Drug use: Not Currently    Sexual activity: Not on file   Other Topics Concern    Parent/sibling w/ CABG, MI or angioplasty before 65F 55M? Not Asked   Social History Narrative    Not on file     Social Determinants of Health     Financial Resource Strain: Low Risk  (10/16/2023)    Financial Resource Strain     Within the past 12 months, have you or your family members you live with been unable to get utilities (heat, electricity) when it was really needed?: No   Food Insecurity: Low Risk  (10/16/2023)    Food Insecurity     Within the past 12 months, did you worry that your food would run out before you got money to buy more?: No     Within the past 12 months, did the food you bought just not last and you didn t have money to get more?: No   Transportation Needs: Low Risk  (10/16/2023)    Transportation Needs     Within the past 12 months, has lack of transportation kept you from medical appointments, getting your medicines, non-medical meetings or appointments, work, or from getting things that you need?: No   Physical Activity: Not on file   Stress: Not on file   Social Connections: Not on file   Interpersonal Safety: Not on file   Housing Stability: Low Risk  (10/16/2023)    Housing Stability     Do you have housing? : Yes     Are you worried about losing your housing?: No           Allergies:   No known drug allergy    Today's clinic visit entailed:  Review of the result(s) of each unique test - echo, BMP, CBC, EKG  Prescription drug management  I spent a total of 50 minutes on the day of the visit.   Time spent by me doing chart review, history and exam, documentation and further activities per the note  Provider  Link to MDM Help Grid     The level of medical decision  making during this visit was of high complexity.

## 2024-01-29 NOTE — DISCHARGE INSTRUCTIONS
It is okay to continue to take your medications as prescribed.  I do recommend ice to the back of your head and sleeping slightly upright as this will help with swelling.  You should return here should you have progressive headaches, nausea, vomiting or worsening difficulty walking otherwise, recommend follow-up in clinic for a recheck.

## 2024-01-29 NOTE — ED PROVIDER NOTES
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  He denies LOC, has no neck pain and is ranging it freely after the c-collar was removed.  He has no other complaints of pain.  Denies chest pain chest pressure heaviness, arm or leg or abdominal pain.      Independent Historian:   None - Patient Only    Review of External Notes:   Discharge summary from [facility] 1/25/2024 admission for intracranial hemorrhage and likely stroke, Eliquis stopped.  He has some mild residual left-sided weakness.      Medications:    acetaminophen (TYLENOL) 325 MG tablet  Alcohol Swabs PADS  amLODIPine (NORVASC) 10 MG tablet  blood glucose (NO BRAND SPECIFIED) lancets standard  blood glucose (NO BRAND SPECIFIED) test strip  blood glucose monitoring (NO BRAND SPECIFIED) meter device kit  carvedilol (COREG) 12.5 MG tablet  Continuous Blood Gluc  (DEXCOM G6 ) JOSE  enoxaparin ANTICOAGULANT (LOVENOX) 40 MG/0.4ML syringe  glucose (BD GLUCOSE) 4 g chewable tablet  insulin aspart (NOVOLOG PEN) 100 UNIT/ML pen  insulin aspart (NOVOLOG PEN) 100 UNIT/ML pen  insulin aspart (NOVOLOG PEN) 100 UNIT/ML pen  insulin aspart (NOVOLOG PEN) 100 UNIT/ML pen  insulin aspart (NOVOLOG PEN) 100 UNIT/ML pen  insulin aspart (NOVOLOG PEN) 100 UNIT/ML pen  insulin glargine (LANTUS PEN) 100 UNIT/ML pen  insulin pen needle (32G X 4 MM) 32G X 4 MM miscellaneous  INSULIN PUMP - OUTPATIENT  irbesartan (AVAPRO) 300 MG tablet  isosorbide mononitrate (IMDUR) 60 MG 24 hr tablet  Lidocaine (LIDOCARE) 4 %  Patch  melatonin 10 MG TABS tablet  melatonin 10 MG TABS tablet  mirtazapine (REMERON) 15 MG tablet  ramelteon (ROZEREM) 8 MG tablet  tamsulosin (FLOMAX) 0.4 MG capsule        Past Medical History:    Past Medical History:   Diagnosis Date    Anemia     Anemia of chronic disease 5/14/2020    Back pain     CKD (chronic kidney disease) stage 3, GFR 30-59 ml/min (H)     CRF (chronic renal failure), stage 3  5/14/2020    Fall 11/2019    Mixed hyperlipidemia 7/7/2004    Paroxysmal atrial fibrillation (H)     Personal history of colonic polyps 1972    Pleural effusion on left 11/2019    Pulmonary hypertension (H) 5/10/2020    Recurrent Left Pleural effusion -- S/P Pleurex Cath 5/12/20 12/30/2019    Rosacea 1989    Type II or unspecified type diabetes mellitus without mention of complication, not stated as uncontrolled 1999    Unspecified essential hypertension 1994       Past Surgical History:    Past Surgical History:   Procedure Laterality Date    ANESTHESIA CARDIOVERSION N/A 2/3/2020    Procedure: ANESTHESIA, FOR CARDIOVERSION;  Surgeon: GENERIC ANESTHESIA PROVIDER;  Location:  OR    CV RIGHT HEART CATH MEASUREMENTS RECORDED N/A 5/13/2020    Procedure: Right Heart Cath;  Surgeon: Senthil Silva MD;  Location:  HEART CARDIAC CATH LAB    HC REMOVE TONSILS/ADENOIDS,<11 Y/O      T & A <11y.o.    IR CHEST TUBE DRAIN TUNNELED LEFT  5/12/2020    IR CHEST TUBE PLACEMENT NON-TUNNELLED LEFT  7/11/2020    IR CHEST TUBE REMOVAL NON TUNNELED LEFT  7/17/2020    IR CHEST TUBE REMOVAL TUNNELED LEFT  7/11/2020    LAPAROSCOPIC CHOLECYSTECTOMY N/A 11/22/2020    Procedure: CHOLECYSTECTOMY, LAPAROSCOPIC;  Surgeon: Annette Lambert MD;  Location:  OR    LAPAROSCOPIC HERNIORRHAPHY INGUINAL BILATERAL Bilateral 10/27/2020    Procedure: LAPAROSCOPIC BILATERAL INGUINAL HERNIA REPAIR WITH MESH;  Surgeon: Brian Garsia MD;  Location:  OR        Physical Exam   Patient Vitals for the past 24 hrs:   BP Temp Temp src Pulse  "Resp SpO2 Height Weight   01/28/24 1931 119/67 97.8  F (36.6  C) Oral 75 20 95 % 1.753 m (5' 9\") 67.1 kg (148 lb)        Physical Exam  Constitutional: Alert, attentive, GCS 15   HENT:   Head: two small occipital scalp hematomas, no laceration  Neck: No midline tenderness, ranging his neck freely after c-collar removed  Eyes: EOM are normal, anicteric, conjugate gaze  CV: distal extremities warm, well perfused  Chest: Non-labored breathing on RA  GI:  non tender. No distension. No guarding or rebound.    MSK: No focal upper or lower extremity tenderness  Neurological: Alert, attentive, moving all extremities equally.   Skin: Skin is warm and dry.      Emergency Department Course     ECG results from 01/28/24   EKG 12 lead     Value    Systolic Blood Pressure     Diastolic Blood Pressure     Ventricular Rate 69    Atrial Rate     ME Interval     QRS Duration 92        QTc 375    P Axis     R AXIS 33    T Axis -32    Interpretation ECG      Atrial fibrillation  Low voltage QRS  Incomplete right bundle branch block  Nonspecific T wave abnormality  Abnormal ECG  When compared with ECG of 28-DEC-2023 08:53,  Incomplete right bundle branch block is now Present  Minimal criteria for Anterior infarct are no longer Present  QT has shortened           Imaging:  Head CT w/o contrast   Final Result   IMPRESSION:       1. Senescent changes and sequelae of chronic microangiopathy without acute intracranial abnormality.         Emergency Department Course & Assessments:    Independent Interpretation (X-rays, CTs, rhythm strip):   I personally reviewed his head CT, see no definite intracranial hemorrhage structure    Consultations/Discussion of Management or Tests:  None        Social Determinants of Health affecting care:   None    Disposition:  The patient was discharged.     Impression & Plan      Medical Decision Making:  Very pleasant 83-year-old male past medical history significant for paroxysmal A-fib, previously on " Eliquis, which was stopped after intracranial hemorrhage 2 months prior which had prolonged  rehab stay discharged 2 days ago but was on Lovenox presenting for mechanical fall where he missed a chair falling backwards hitting his head.  He denies LOC, denies any neck pain and is ranging it freely after c-collar removed.  He has no other complaints of pain.  He was sent for CT imaging of his head which shows no intracranial hemorrhage.  No primary closure required for his scalp abrasions.  He is comfortable going home, he was ambulatory here with complaints of pain and he was discharged    Diagnosis:    ICD-10-CM    1. Scalp hematoma, initial encounter  S00.03XA       2. Fall, initial encounter  W19.XXXA       3. Hx of intracranial hemorrhage  Z86.79       4. History of stroke  Z86.73            Discharge Medications:  New Prescriptions    No medications on file        Wilfrid Tirado MD  [employer]  8:41 PM 01/28/24          Wilfrid Tirado MD  01/28/24 [id number]

## 2024-01-29 NOTE — LETTER
1/29/2024    Jessika Ratliff Adalid  [address]  [place] [address]    RE: Tc Garcia       Dear Colleague,     I had the pleasure of seeing Tc Garcia in the [facility].    Cardiology Clinic Progress Note - C.O.RWAQAS (Heart Failure Specialty)    Tc Garcia MRN# [id number]   YOB: 1940 Age: 83 year old   Primary cardiologist: Dr. Julien         Assessment and Plan:     In summary, Tc Garcia presents to the [facility] clinic for follow up after a recent prolonged hospitalization for mechanical fall resulting in a recurrent intracranial hemorrhage with a possible left medial frontal lobe infarct, deficits including reduced visual acuity and mild left hemiparesis. Anticoagulation appropriately stopped as a result. He had another fall with head contusion just yesterday with benign imaging in the ED.    From a HF standpoint, he has peripheral edema with clear lungs and no complaints of dyspnea. BP is robust.     Plan:  - Message sent to Kaya Brenner (KAMILLA, neurology) regarding clearance for watchman implantation ([id number] score of 5) which would require intubation/sedation and short-term anticoagulation with long-term antiplatelet (aspirin) therapy. He sees her 2/2.   - Referral to Dr. Dubois for watchman consideration.   - Start furosemide 20 mg daily; see me in four weeks with a BMP prior. He will contact me if his leg swelling does not improve with this over the next week.     Ame Mejía PA-C  [facility]        History of Presenting Illness:      Tc Garcia is a pleasant 83 year old patient who presents today for a [facility] clinic follow up visit.     The patient has a complex history of the following -   # Chronic HFpEF, with multiple admissions in 2020. Enrolled in [facility] with goal wt 155-163 lbs.  # PHTN out of proportion to LH disease, sildenafil previously tried but not tolerated due to fluctuating volume status, hypotension and issues with  med compliance. Has declined pulmonary rehab.   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 Anticoagulated with Eliquis.  # Poorly-controlled DMII  # HTN  # HLP  # CKD with variable renal function response to diuretics, follows with [facility].   # Anemia of chronic disease, requiring intermittent Fe infusions, followed by nephrology  # Obesity  # Hx of medication confusion and non-compliance, self-adjusts diuretics frequently.  # Social - Lives with wife, Radha. Sedentary. High sodium diet, medication noncompliance, some concern over cognitive function and medical literacy.  # DNR/DNI CODE STATUS.    Please see my Clinic note dated 11/12/2021 for Tc's detailed history.  In brief, Tc had multiple admissions in the summer 2020 for acute HFpEF and a reaccumulating pleural effusion.  Since then, he has been followed closely in the [facility] clinic, and has remained out of the hospital until March 2021 when he was admitted for dehydration after he had 9 teeth extracted.  His losartan, Lasix, and spironolactone were all held, and then slowly readministered.  However after that, his spironolactone was discontinued completely due to hyperkalemia.    Of note, his creatinine has been quite variable, ranging from 1.3 - 1.9 over the past year.  He is followed by nephrology.    Unfortunately, he has again had a recently tumultuous course as detailed below.    In January 2023, the patient was admitted to [facility] several times. He presented initially on 1/15/23 with weakness and altered mental status and was found to have nontraumatic intraventricular intracerebral hemorrhage in the setting of hypertension and anticoagulation with apixaban. He received Kcentra for anticoagulation reversal. He  "re-presented on 1/29/23 with very labile blood sugars going from the 600s to under 50 after treatment. He was also hypotensive initially in the setting of hypovolemia and had some issues with labile blood pressures during his stay as well. Several adjustments were made to medications, including discontinuation of apixaban. Irbesratan was decreased from 150 mg to 75 mg once daily. He was also previously on lasix 40 mg once daily as well as amlodipine at 10 mg once daily and these were both stopped.    He was later evaluated again in the Emergency Department on 2/11/2023 with \"seizure like activity\" - per chart review noted to have some stiffening but no extremity shaking and decreased level of consciousness with blood glucose at the time noted to be 33. Head CT showed resolving hemorrhage.   He was then admitted in April with diabetic ketoacidosis and ABBIE. He had some issues with worsening dyspnea while there. TTE during his stay showed stable preserved EF with moderately dilated RV, moderately severe TR and moderate-severe PHTN. He was placed back on Lasix at 20 mg daily. Additionally, neuro saw him and placed him back on Eliquis due to resolved hemorrhage. Note states they felt his prior CVA was related to suboptimally treated HTN.  There were of course medication compliance concerns, and per notes his daughter stated she would help with this going forward. It was strongly recommended that he discharged to an LTAC, but he stated he would rather die at home than go to an LTAC for stabilization.   Issues with volume overload after that d/t furosemide non-compliance.   In November, his furosemide was stopped due to ABBIE with diarrhea.  He saw my colleague Tracey who recommended urgent PCP evaluation in the setting of ongoing unintentional weight loss and urgent nephrology referral as well.  Then admitted at the [facility] December 2023 for a month after a fall resulted in a recurrent intracranial hemorrhage with a possible " "left medial frontal lobe infarct, resulting in reduced visual acuity and mild left hemiparesis.  Anticoagulation was stopped.  His stay was complicated by DKA.  He was discharged to inpatient rehabilitation and discharged from there 4 days ago.  Echocardiogram during his stay showed an EF of 60 to 65%, moderate RV dilation with normal systolic function, severe pulmonary hypertension, and mild to moderate TR.  He visited the ER yesterday for another mechanical fall during which he hit his head.  CT showed no intracranial hemorrhage and he was discharged home.     Today, Tc returns to clinic with his wife and son, whom I am just meeting today. They (and he) feel he's doing remarkably well for all he's been through. No real dyspnea, orthopnea, chest pain, palpitations, near-syncope. He remains off all diuretics. He does have considerable peripheral edema on exam, clear lungs, and in rate controlled AF. Previously, a good wt for him was ~145 lbs. Now he's at 147, but previously lost wt this past fall. Recent wt gain but they feel related to significantly improving appetite. Recent creat stable ~1.4. Now at [facility] -- Continues to live independently with his wife at baseline but is touring [facility] with his son so he can hopefully transition there on TCU discharge.          Review of Systems:     12-pt ROS is negative except for as noted in the HPI.          Physical Exam:     Vitals: BP (!) 148/78   Pulse 76   Ht 1.753 m (5' 9\")   Wt 66.7 kg (147 lb)   SpO2 98%   BMI 21.71 kg/m    Wt Readings from Last 10 Encounters:   01/29/24 66.7 kg (147 lb)   01/28/24 67.1 kg (148 lb)   01/20/24 65 kg (143 lb 4.8 oz)   12/28/23 62.6 kg (138 lb 0.1 oz)   12/27/23 62.6 kg (138 lb)   12/24/23 60.8 kg (134 lb 0.6 oz)   11/20/23 62.6 kg (138 lb)   11/16/23 60.8 kg (134 lb)   10/26/23 63.5 kg (140 lb)   10/14/23 60.8 kg (134 lb)       Constitutional:  Patient is pleasant, alert, cooperative, and in NAD.  HEENT:  NCAT. PERRLA. EOM's " intact.   Neck:  CVP appears normal. No carotid bruits.   Pulmonary: Normal respiratory effort. CTAB  Cardiac: Irregularly irregular, variable S1, no S3/S4, grade 3/6 sm at the LSB.  Abdomen:  Non-tender abdomen, no hepatosplenomegaly appreciated.   Vascular: Pulses in the upper and lower extremities are 2+ and equal bilaterally.  Extremities: 2+ pitting edema pretibial region R>L.  Skin:  No rashes or lesions appreciated.   Neurological:  No gross motor or sensory deficits.   Psych: Appropriate affect.        Data:     Labs reviewed:  Recent Labs   Lab Test 12/12/23  0543 10/14/23  0143 09/20/23  0946 05/26/23  0910 01/17/23  0436 01/15/23  1321 06/08/22  0853 03/17/21  0752 09/02/20  1044 08/10/20  1027 06/16/20  1313 05/13/20  0553 05/12/20  0541 05/11/20  0542   LDL 54  --   --   --  52  --   --   --   --   --   --  43  --   --    HDL 68  --   --   --  50  --   --   --   --   --   --  60  --   --    NHDL 69  --   --   --  62  --   --   --   --   --   --  56  --   --    CHOL 137  --   --   --  112  --   --   --   --   --   --  116  --   --    TRIG 76  --   --   --  51  --   --   --   --   --   --  65  --   --    TSH  --   --   --   --   --  3.11  --  0.94  --  4.75*  --   --    < >  --    NTBNP  --  2,963* 4,488* 3,829*  --   --    < >  --    < >  --    < >  --   --   --    IRON  --   --   --   --   --   --   --   --   --   --   --   --   --  16*   FEB  --   --   --   --   --   --   --   --   --   --   --   --   --  161*   IRONSAT  --   --   --   --   --   --   --   --   --   --   --   --   --  10*   ERNESTO  --   --   --   --   --   --   --   --   --   --   --   --   --  142    < > = values in this interval not displayed.       Lab Results   Component Value Date    WBC 6.9 01/22/2024    WBC 10.4 06/15/2021    RBC 3.14 (L) 01/22/2024    RBC 4.08 (A) 06/15/2021    HGB 8.8 (L) 01/22/2024    HGB 11.7 (A) 06/15/2021    HCT 28.1 (L) 01/22/2024    HCT 36.3 (A) 06/15/2021    MCV 90 01/22/2024    MCV 89 06/15/2021    MCH  28.0 01/22/2024    MCH 28.7 06/15/2021    MCHC 31.3 (L) 01/22/2024    MCHC 32.2 06/15/2021    RDW 18.7 (H) 01/22/2024    RDW 14.2 06/15/2021     01/22/2024     06/15/2021       Lab Results   Component Value Date     01/22/2024     07/12/2021    POTASSIUM 4.0 01/22/2024    POTASSIUM 4.2 01/17/2023    POTASSIUM 4.0 07/12/2021    CHLORIDE 107 01/22/2024    CHLORIDE 105 01/17/2023    CHLORIDE 102 11/04/2021    CHLORIDE 100 07/12/2021    CO2 28 01/22/2024    CO2 25 01/17/2023    CO2 26 07/12/2021    ANIONGAP 7 01/22/2024    ANIONGAP 12 01/17/2023    ANIONGAP 4 03/18/2021     (H) 01/25/2024     (H) 01/17/2023     (A) 07/12/2021    BUN 24.0 (H) 01/22/2024    BUN 36 (H) 01/17/2023    BUN 18 07/12/2021    BUN 10 07/12/2021    CR 1.42 (H) 01/22/2024    CR 1.73 (A) 07/12/2021    GFRESTIMATED 49 (L) 01/22/2024    GFRESTIMATED 40 (L) 06/21/2021    GFRESTBLACK 46 (L) 06/21/2021    LLOYD 8.8 01/22/2024    LLOYD 9.3 07/12/2021      Lab Results   Component Value Date    AST 9 12/29/2023    AST 12 03/22/2021    ALT <5 12/29/2023    ALT 5 03/22/2021       Lab Results   Component Value Date    A1C 8.1 (H) 12/12/2023    A1C 7.7 (H) 11/20/2020       Lab Results   Component Value Date    INR 1.35 (H) 12/11/2023    INR 1.13 01/15/2023    INR 1.50 (H) 11/21/2020    INR 1.18 (H) 07/17/2020           Problem List:     Patient Active Problem List   Diagnosis    DM type 2, Hgb A1C 8.2 on 4/25/20    Mixed hyperlipidemia    Essential hypertension    Pain in limb    Plantar fascial fibromatosis    HYPERLIPIDEMIA LDL GOAL <100    Hemothorax on left    Recurrent Left Pleural effusion -- S/P Pleurex Cath 5/12/20    ABBIE (acute kidney injury) (H24)    Acute respiratory failure with hypoxia (H)    Pulmonary hypertension (H)    CRF (chronic renal failure), stage 3     Anemia due to chronic kidney disease    Atrial fibrillation/flutter -- on Eliquis and Amiodarone    DKA (diabetic ketoacidoses)    Anemia in CKD  (chronic kidney disease)    Dyspnea    SOB (shortness of breath)    Non-recurrent bilateral inguinal hernia without obstruction or gangrene    Acute cholecystitis    Abdominal pain, generalized    Non-intractable vomiting with nausea, unspecified vomiting type    Alcohol dependence (H)    CHF (congestive heart failure) (H)    Syncope and collapse    Weakness    Postoperative state    Acute kidney injury (H24)    Chronic diastolic heart failure (H)    Anticoagulated    Right-sided nontraumatic intraventricular intracerebral hemorrhage (H)    Dehydration    Hypoglycemia    Hypoxia    DNR (do not resuscitate)    Hypotension, unspecified hypotension type    Diabetic nephropathy associated with type 2 diabetes mellitus (H)    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, or unspecified(403.00)    Nephrotic range proteinuria    Secondary hyperparathyroidism of renal origin (H24)    Stage 3b chronic kidney disease (H)    Vitamin D deficiency    Diabetic ketoacidosis without coma associated with type 2 diabetes mellitus (H)    Intraparenchymal hemorrhage of brain (H)    Acute cystitis without hematuria    Constipation, unspecified constipation type    Pain in extremity, unspecified extremity    Type 2 diabetes mellitus with other specified complication, with long-term current use of insulin (H)    Nontraumatic intraventricular intracerebral hemorrhage, unspecified laterality (H)    Iron deficiency anemia    Intracranial hemorrhage (H)    Hyperglycemia    Inadequately controlled diabetes mellitus (H)           Medications:     Current Outpatient Medications   Medication Sig Dispense Refill    acetaminophen (TYLENOL) 325 MG tablet Take 2 tablets (650 mg) by mouth every 6 hours as needed for mild pain or fever  0    Alcohol Swabs PADS Use to swab the area of the injection or bina as directed Per insurance coverage 100 each 0    amLODIPine (NORVASC) 10 MG tablet Take 1 tablet (10  mg) by mouth daily 30 tablet 0    blood glucose (NO BRAND SPECIFIED) lancets standard To use to test glucose level in the blood Use to test blood sugar  4  times daily as directed. To accompany glucose monitor brands per insurance coverage. 100 each 0    blood glucose (NO BRAND SPECIFIED) test strip To use to test glucose level in the blood Use to test blood sugar  4 times daily as directed. To accompany glucose monitor brands per insurance coverage. 100 strip 0    blood glucose monitoring (NO BRAND SPECIFIED) meter device kit Use as directed Per insurance coverage 1 kit 0    carvedilol (COREG) 12.5 MG tablet Take 1 tablet (12.5 mg) by mouth 2 times daily (with meals) 60 tablet 0    Continuous Blood Gluc  (DEXCOM G6 ) JOSE Dexcom G6    USE EACH WITH 10 DAYS SENSORS      glucose (BD GLUCOSE) 4 g chewable tablet Take 4 tablets by mouth every 15 minutes as needed for low blood sugar 50 tablet 0    insulin aspart (NOVOLOG PEN) 100 UNIT/ML pen Inject 1-10 Units Subcutaneous Take with snacks or supplements for high blood sugar 15 mL 0    insulin aspart (NOVOLOG PEN) 100 UNIT/ML pen Inject 1-7 Units Subcutaneous 3 times daily (before meals) 15 mL 0    insulin aspart (NOVOLOG PEN) 100 UNIT/ML pen Inject 1-5 Units Subcutaneous 2 times daily 15 mL 0    insulin aspart (NOVOLOG PEN) 100 UNIT/ML pen Inject 1-8 Units Subcutaneous daily (with breakfast) 15 mL 0    insulin aspart (NOVOLOG PEN) 100 UNIT/ML pen Inject 1-8 Units Subcutaneous daily (with lunch) 15 mL 0    insulin aspart (NOVOLOG PEN) 100 UNIT/ML pen Inject 1-8 Units Subcutaneous daily (with dinner) 15 mL 0    insulin glargine (LANTUS PEN) 100 UNIT/ML pen Inject 7 Units Subcutaneous every 24 hours 15 mL 0    insulin pen needle (32G X 4 MM) 32G X 4 MM miscellaneous Use as directed by provider Per insurance coverage 100 each 0    INSULIN PUMP - OUTPATIENT Medtronic device with no CGM and site change every 3 days   Sensitivity factor (midnight to  midnight): 55  Bolus (units:gram): 4498-0423 = 1:9, 6047-6472 = 1:7, 1278-5797 = 1:7, 8540-9509 = 1:9, 4998-6687 = 1:13  Basal (units/hour): 6826-9330 = 0.45, 9487-7420 = 0.5, 9626-0764 = 0.625, 3039-9719 = 0.6, 4813-0837 = 0.45      irbesartan (AVAPRO) 300 MG tablet Take 1 tablet (300 mg) by mouth at bedtime 30 tablet 0    isosorbide mononitrate (IMDUR) 60 MG 24 hr tablet Take 1 tablet (60 mg) by mouth every evening 30 tablet 0    Lidocaine (LIDOCARE) 4 % Patch Place 1 patch onto the skin every 24 hours To prevent lidocaine toxicity, patient should be patch free for 12 hrs daily. 30 patch 0    melatonin 10 MG TABS tablet Take 1 tablet (10 mg) by mouth at bedtime      mirtazapine (REMERON) 15 MG tablet Take 1 tablet (15 mg) by mouth at bedtime 30 tablet 0    ramelteon (ROZEREM) 8 MG tablet Take 1 tablet (8 mg) by mouth at bedtime 30 tablet 0    tamsulosin (FLOMAX) 0.4 MG capsule Take 1 capsule (0.4 mg) by mouth every morning 30 capsule 0    enoxaparin ANTICOAGULANT (LOVENOX) 40 MG/0.4ML syringe Inject 0.4 mLs (40 mg) Subcutaneous every 24 hours (Patient not taking: Reported on 1/29/2024) 24 mL 0           Past Medical History:     Past Medical History:   Diagnosis Date    Anemia     Anemia of chronic disease 5/14/2020    Back pain     CKD (chronic kidney disease) stage 3, GFR 30-59 ml/min (H)     CRF (chronic renal failure), stage 3  5/14/2020    Fall 11/2019    suffered multiple left rib fractures, C3 and T2 fractures    Mixed hyperlipidemia 7/7/2004    Paroxysmal atrial fibrillation (H)     Personal history of colonic polyps 1972    gets colonoscopy every five years, due in 2006    Pleural effusion on left 11/2019    after multiple rib fractures    Pulmonary hypertension (H) 5/10/2020    Added automatically from request for surgery [id number]    Recurrent Left Pleural effusion -- S/P Pleurex Cath 5/12/20 12/30/2019    Rosacea 1989    Type II or unspecified type diabetes mellitus without mention of complication, not  stated as uncontrolled     Unspecified essential hypertension      Past Surgical History:   Procedure Laterality Date    ANESTHESIA CARDIOVERSION N/A 2/3/2020    Procedure: ANESTHESIA, FOR CARDIOVERSION;  Surgeon: GENERIC ANESTHESIA PROVIDER;  Location:  OR    CV RIGHT HEART CATH MEASUREMENTS RECORDED N/A 2020    Procedure: Right Heart Cath;  Surgeon: Senthil Silva MD;  Location:  HEART CARDIAC CATH LAB    HC REMOVE TONSILS/ADENOIDS,<11 Y/O      T & A <11y.o.    IR CHEST TUBE DRAIN TUNNELED LEFT  2020    IR CHEST TUBE PLACEMENT NON-TUNNELLED LEFT  2020    IR CHEST TUBE REMOVAL NON TUNNELED LEFT  2020    IR CHEST TUBE REMOVAL TUNNELED LEFT  2020    LAPAROSCOPIC CHOLECYSTECTOMY N/A 2020    Procedure: CHOLECYSTECTOMY, LAPAROSCOPIC;  Surgeon: Annette Lambert MD;  Location:  OR    LAPAROSCOPIC HERNIORRHAPHY INGUINAL BILATERAL Bilateral 10/27/2020    Procedure: LAPAROSCOPIC BILATERAL INGUINAL HERNIA REPAIR WITH MESH;  Surgeon: Brian Garsia MD;  Location:  OR     Family History   Problem Relation Age of Onset    Family History Negative Mother          age 71    Family History Negative Father          age 70    Diabetes Brother         alive age 77    Diabetes Brother         alive age 76    Family History Negative Brother             Family History Negative Brother             Diabetes Sister         alive age74    Family History Negative Sister          age 86    Heart Disease Sister             Family History Negative Sister             Family History Negative Sister             Diabetes Sister     Diabetes Sister     Diabetes Brother     Diabetes Brother      Social History     Socioeconomic History    Marital status:      Spouse name: Lianna    Number of children: 4    Years of education: 16    Highest education level: Not on file   Occupational History    Occupation: navabi      Employer: [employer]    Tobacco Use    Smoking status: Former     Packs/day: 0.20     Years: 40.00     Additional pack years: 0.00     Total pack years: 8.00     Types: Cigarettes     Start date:      Quit date: 2008     Years since quittin.0    Smokeless tobacco: Never   Vaping Use    Vaping Use: Never used   Substance and Sexual Activity    Alcohol use: Not Currently     Alcohol/week: 35.0 standard drinks of alcohol    Drug use: Not Currently    Sexual activity: Not on file   Other Topics Concern    Parent/sibling w/ CABG, MI or angioplasty before 65F 55M? Not Asked   Social History Narrative    Not on file     Social Determinants of Health     Financial Resource Strain: Low Risk  (10/16/2023)    Financial Resource Strain     Within the past 12 months, have you or your family members you live with been unable to get utilities (heat, electricity) when it was really needed?: No   Food Insecurity: Low Risk  (10/16/2023)    Food Insecurity     Within the past 12 months, did you worry that your food would run out before you got money to buy more?: No     Within the past 12 months, did the food you bought just not last and you didn t have money to get more?: No   Transportation Needs: Low Risk  (10/16/2023)    Transportation Needs     Within the past 12 months, has lack of transportation kept you from medical appointments, getting your medicines, non-medical meetings or appointments, work, or from getting things that you need?: No   Physical Activity: Not on file   Stress: Not on file   Social Connections: Not on file   Interpersonal Safety: Not on file   Housing Stability: Low Risk  (10/16/2023)    Housing Stability     Do you have housing? : Yes     Are you worried about losing your housing?: No           Allergies:   No known drug allergy    Today's clinic visit entailed:  Review of the result(s) of each unique test - echo, BMP, CBC, EKG  Prescription drug management  I spent a total of  50 minutes on the day of the visit.   Time spent by me doing chart review, history and exam, documentation and further activities per the note  Provider  Link to MDM Help Grid     The level of medical decision making during this visit was of high complexity.      Thank you for allowing me to participate in the care of your patient.      Sincerely,     Ame Mejía PA-C     [facility]  cc:   Lacie Tapia, OMAR CNP  [address]  [place]  [place] [address]

## 2024-01-29 NOTE — PATIENT INSTRUCTIONS
Today, we discussed the following:   - Medication changes:    START furosemide 20 mg daily.   CALL me if you notice no change in your swelling in a week.  - Follow up:   Schedule a consultation with Dr. Dubois for the WATCHMAN procedure.   See me in about four weeks with a non-fasting blood draw prior.     ____________________________________________________________________  Please, remember to continue the followin.  Weigh yourself daily. Call if your weight is up > than 2 pounds in one day, or 5 pounds in one week; if you feel more short of breath or have worsening swelling in your legs or abdomen.  2.  Eat a low sodium diet (less than 2,000mg or 2g daily). If you eat less salt, you will retain less fluid.   3.  Avoid alcohol, as this can worsen heart failure.   4.  Avoid NSAIDs as able (For example, Ibuprofen / aleve / advil / naprosen / diclofenac).    Please call [facility] nurse for any questions or concerns Mon-Fri 8am-4pm:   [phone]  Scheduling phone number:   [phone]    Thank you for visiting with us today.   Ame Mejía PA-C  ______________________________________________________________

## 2024-01-29 NOTE — ED NOTES
Bed: FT01  Expected date:   Expected time:   Means of arrival:   Comments:  Sai 544 83M fall, hit head eta 1916

## 2024-01-29 NOTE — TELEPHONE ENCOUNTER
[facility] Call Center    Phone Message    May a detailed message be left on voicemail: yes     Reason for Call: Other: patients daughter is calling with a fax number to fax medication list which is [phone], please advise, thank you.     Action Taken: Other: cardiology     Travel Screening: Not Applicable  Thank you!  [facility]

## 2024-01-29 NOTE — TELEPHONE ENCOUNTER
[facility]    Called dtr Brittani back requesting name and phone number of facility as well to update our records.  Brittani reports she isn't helping with this and lives in [place].  She recommends calling her sister Camille who is helping w/ Tc.     Called Camille x2, no answer, unable to leave .        Future Appointments   Date Time Provider Department Center   2/2/2024  3:00 PM Kaya Brenner PA-C [facility]    2/13/2024  3:45 PM Barney Dubois MD [facility]   3/1/2024  1:30 PM [facility]   3/1/2024  2:30 PM [facility]     Annette Mills RN BSN   [facility]- [place], [place]  [facility] Care Coordinator  01/29/24, 2:34 PM

## 2024-01-31 NOTE — PROGRESS NOTES
Clinic Care Coordination Contact      Clinical Data: Patient was hospitalized at [facility] from 12/8-12/29 with diagnosis of intracranial bleed. He was discharged from [facility] to [facility] where he stayed from 12/29-1/25. Pt was discharged home with home health after much communication and work with the [facility] , pt and pt's family to set up a safe discharge plan as there were multiple concerns about pt returning home. Per notes, pt and spouse were recommended to move into assisted living but they had been resistant to this. Private pay and Medicare covered home health care services were arranged while the pt and family worked with an agency to locate an assisted living.    Pt also had an ED visit on 1/28 after sustaining a fall at home. After returning home from [facility],  he was going to sit in a chair, missed and fell backwards and hit his head.    Assessment/Intervention: BERTHA BORDEN placed call pt pt, his spouse Radha answered, she explained that they were currently visiting the assisted living that they were planning to move into. Pt's son Maxx got on the phone, he explained that they were in the middle of completing paperwork at the assisted living facility where they plan to have pt and spouse move into. The assisted living facility is located in [place]. Radha stated that they cannot bring the cat and have found somewhere for the cat to go and they are able to bring the dog.     Maxx is here helping his parents and lives in [facility]. We agreed that writer would re-reach out in 2 days. Maxx does plan to attend pt's PCP appointment tomorrow.     Plan: BERTHA BORDEN will reach out to pt/family in 2 days.    Ronda Enrique [facility]  Social Work Care Coordinator  Phone: [phone]

## 2024-02-02 NOTE — PATIENT INSTRUCTIONS
Dr. Dubois (cardiology) and Dr. Abdoul Hermosillo (neurology) to coordinate recommendation of Watchman including timing

## 2024-02-02 NOTE — NURSING NOTE
Symptoms or concerns today:     Med comments:     Who the patient is here with today: Wife Radha and son Maxx Khan TERA Mcnair MA

## 2024-02-02 NOTE — LETTER
2/2/2024         RE: Tc Garcia  [address] [address] [id number]        Dear Colleague,    Thank you for referring your patient, Tc Garcia, to the [facility]UR[facility]. Please see a copy of my visit note below.    __________________________________________________________      [facility]   [phone]  __________________________________________________________    Chief Complaint: Patient presents with:  Stroke: Stroke      History of Present Illness: Tc Garcia is a 83 year old male presenting for follow-up for intracranial hemorrhage.     He was hospitalized at [facility] on December 11, 2023. Prior to the hospital stay, he had a past medical history of atrial fibrillation on Eliquis, chronic renal failure, hyperlipidemia, *prior ischemic stroke with hemorrhagic conversion, hypertension, diabetes.  He had just been in our clinic about a month before that.    He presented to the hospital with dizziness, double vision, and nausea.     *January 2023, scattered areas of stroke felt to be cardioembolic due to atrial fibrillation and the missed doses of apixaban.  Also had right lateral ventricle IVH, felt to be due to hypertension and so the Eliquis was reversed with Kcentra.    Stroke Evaluation Summarized:    (Studies personally re-reviewed by me today or new results resulted and reviewed by me today are in BOLD below)    MRI/Head CT Recent follow-up head CT 1/28/2024 showed no acute intracranial abnormality   Intracranial Vasculature CTA head shows moderate presumed atherosclerotic narrowing of P2 segments over the PCAs bilaterally   Cervical Vasculature CTA neck shows plaque without stenosis of the ICAs bilaterally   I personally reviewed the following neuroimaging studies today and the comments above reflect my own personal interpretation of the images: images: CT head, MRI brain, and I also showed the CT to the patient  myself today.    Echocardiogram EF 60 to 65%, severe biatrial enlargement, rhythm atrial fibrillation, mild to moderate tricuspid regurgitation   EKG/Telemetry Atrial fibrillation   Other Testing Not Applicable      Labs Lab Results   Component Value Date    LDL 54 12/12/2023    A1C 8.1 (H) 12/12/2023    CTROPT 30 (H) 12/11/2023    INR 1.35 (H) 12/11/2023    INR 1.13 01/15/2023        He saw cardiology on 1/29/2024, and has another appointment with him on 2/13/2024 to discuss in more detail about Watchman.  Overall, he has been feeling great.  He baked chocolate chip cookies by himself yesterday.  He has no headaches, no numbness, no tingling.  His blood pressure has been 1 17-1 50 systolic.  He has not felt lightheaded.  He had a fall this past Sunday when he missed a chair.  He went to the ER and had a follow-up head CT which looked good in terms of the amount of hemorrhage slowly disappearing.    [facility] is about to [facility] into [facility]    Impression/Plan:     1.  Recurrent intracranial hemorrhage into the cerebellum, suspected due to hypertension in the setting of chronic anticoagulation, previously on Eliquis,, versus traumatic hemorrhage after sudden onset of dizziness and fall    -Fortunately his blood pressure is mostly well-controlled.  While it is listed as 168 at this visit, we rechecked at the end and it was 130 systolic.  Continued good long-term blood pressure control.  -Continues to stay off anticoagulation.  Regarding the potential upcoming Watchman procedure, I would like to discuss with my attending Dr. Abdoul Hermosillo who is out of the office today about what the ideal timing for this would be.  Aware patient would have to be on some temporary antithrombotic agent.  Will arrange for Dr. Hermosillo to speak with Dr. Dubois about the patient having this procedure  -Continue physical and occupational therapy for any ongoing balance issues although he is really doing quite well with  this  -With how good his recent head CT looked which was done after a fall, I do not think another CT is needed.    - Return in about 3 months (around 5/2/2024) for ideally Alena or  only, virtual ok. Call daughter Camille in demographics to arrange.    Stroke Education provided.  He will call us with any questions.  For any acute neurologic deficits he was advised to  go directly to the hospital rather than call the clinic.    Kaya Brenner PA-C  Neurology  02/07/2024    ___________________________________________________________________    Current Medications  Current Outpatient Medications   Medication Sig     acetaminophen (TYLENOL) 325 MG tablet Take 2 tablets (650 mg) by mouth every 6 hours as needed for mild pain or fever     Alcohol Swabs PADS Use to swab the area of the injection or bina as directed Per insurance coverage     amLODIPine (NORVASC) 10 MG tablet Take 1 tablet (10 mg) by mouth daily     blood glucose (NO BRAND SPECIFIED) lancets standard To use to test glucose level in the blood Use to test blood sugar  4  times daily as directed. To accompany glucose monitor brands per insurance coverage.     blood glucose (NO BRAND SPECIFIED) test strip To use to test glucose level in the blood Use to test blood sugar  4 times daily as directed. To accompany glucose monitor brands per insurance coverage.     blood glucose monitoring (NO BRAND SPECIFIED) meter device kit Use as directed Per insurance coverage     carvedilol (COREG) 12.5 MG tablet Take 1 tablet (12.5 mg) by mouth 2 times daily (with meals)     Continuous Blood Gluc  (DEXCOM G6 ) JOSE Dexcom G6    USE EACH WITH 10 DAYS SENSORS     glucose (BD GLUCOSE) 4 g chewable tablet Take 4 tablets by mouth every 15 minutes as needed for low blood sugar     insulin aspart (NOVOLOG PEN) 100 UNIT/ML pen Inject 1-10 Units Subcutaneous Take with snacks or supplements for high blood sugar     insulin aspart (NOVOLOG PEN) 100 UNIT/ML  pen Inject 1-7 Units Subcutaneous 3 times daily (before meals)     insulin aspart (NOVOLOG PEN) 100 UNIT/ML pen Inject 1-8 Units Subcutaneous daily (with breakfast)     insulin aspart (NOVOLOG PEN) 100 UNIT/ML pen Inject 1-8 Units Subcutaneous daily (with lunch)     insulin aspart (NOVOLOG PEN) 100 UNIT/ML pen Inject 1-8 Units Subcutaneous daily (with dinner)     insulin glargine (LANTUS PEN) 100 UNIT/ML pen Inject 7 Units Subcutaneous every 24 hours     insulin pen needle (32G X 4 MM) 32G X 4 MM miscellaneous Use as directed by provider Per insurance coverage     INSULIN PUMP - OUTPATIENT Medtronic device with no CGM and site change every 3 days   Sensitivity factor (midnight to midnight): 55  Bolus (units:gram): 0228-1998 = 1:9, 2080-7009 = 1:7, 6784-3174 = 1:7, 8296-7942 = 1:9, 1894-3910 = 1:13  Basal (units/hour): 3304-3905 = 0.45, 1109-7472 = 0.5, 1925-0233 = 0.625, 4460-2679 = 0.6, 8934-5764 = 0.45     irbesartan (AVAPRO) 300 MG tablet Take 1 tablet (300 mg) by mouth at bedtime     isosorbide mononitrate (IMDUR) 60 MG 24 hr tablet Take 1 tablet (60 mg) by mouth every evening     Lidocaine (LIDOCARE) 4 % Patch Place 1 patch onto the skin every 24 hours To prevent lidocaine toxicity, patient should be patch free for 12 hrs daily.     melatonin 10 MG TABS tablet Take 1 tablet (10 mg) by mouth at bedtime     mirtazapine (REMERON) 15 MG tablet Take 1 tablet (15 mg) by mouth at bedtime     ramelteon (ROZEREM) 8 MG tablet Take 1 tablet (8 mg) by mouth at bedtime     tamsulosin (FLOMAX) 0.4 MG capsule Take 1 capsule (0.4 mg) by mouth every morning     furosemide (LASIX) 20 MG tablet Take 1 tablet (20 mg) by mouth daily     insulin aspart (NOVOLOG PEN) 100 UNIT/ML pen Inject 1-5 Units Subcutaneous 2 times daily     No current facility-administered medications for this visit.       Physical Exam    Estimated body mass index is 21.71 kg/m  as calculated from the following:    Height as of this encounter: 1.753 m (5'  "9\").    Weight as of this encounter: 66.7 kg (147 lb).    BP (!) 168/81   Pulse 69   Ht 1.753 m (5' 9\")   Wt 66.7 kg (147 lb)   SpO2 100%   BMI 21.71 kg/m         General Exam  General: Sitting up in chair in no acute distress    Neurologic:  Mental Status:  alert, oriented x 3, follows commands, speech clear and fluent  Cranial Nerves:  visual fields intact, PERRL, EOMI with normal smooth pursuit, facial sensation intact and symmetric, facial movements symmetric, hearing not formally tested but intact to conversation, palate elevation symmetric and uvula midline, shoulder shrug strong bilaterally, tongue protrusion midline, mild nonspecific dysarthria  Motor:  normal muscle tone and bulk, no abnormal movements, able to move all limbs spontaneously, strength 5/5 throughout upper and lower extremities, no pronator drift  Reflexes:   deferrerd  Sensory:  light touch sensation intact and symmetric throughout upper and lower extremities  Coordination:  normal finger-to-nose and heel-to-shin bilaterally without dysmetria, rapid alternating movements symmetric  Station/Gait:   stable with walker    Questionnaires:        2/27/2023     8:51 AM 2/2/2024     3:09 PM   Stroke Questionnaire   Residual effects: Any residual effects from stroke? No, I feel totally back to how I was before the stroke Yes, I do   Residual effects: Most bothersome symptom  left side a little weaker and double vision   Level of independence: Could you live alone? Yes    Level of independence: Walking  Dependent   Level of independence: Eating  Need some help   Level of independence: Stairs  Dependent   Level of independence: Dressing  Independent   Level of independence: Bathing  Need some help   Level of independence: Toileting  Independent   Incontinence: Bowel?  Sometimes   Incontinence: Bladder?  Sometimes   Able to: Use electronics  Yes   Able to: Cook or do house chores  Yes   Able to: Do own shopping, including online  No   Able to: " Manage own finances  No   Current therapy: Physical Therapy  No   Current therapy: Occupation Therapy  No   Current therapy: Speech Therapy  No   Current therapy: Other  No   Current services: Home Health  Yes: home health nurse comes to the home   Quality of Life: What work now or before stroke Retired    Quality of Life: Current work status Retired    Quality of Life: How do you get around Drive myself    Quality of Life: Living situation before stroke With family or significant other    Quality of Life: Living situation now With family or significant other    Medication: How do you take your meds Myself, from a pillbox that I set up Myself, from a pillbox that someone else sets up   Medication: Do you ever miss or forget meds Rarely Rarely   Risk Factor: Checking blood pressure at home Yes Yes   Risk Factor: Usual blood pressure numbers 170/90 145 78   Risk Factor: Checking blood sugar at home Yes Yes   Risk Factor: Usual blood sugar numbers 100 to 150 100 to 150   Risk Factor: Second-hand smoke at home No No   Risk Factor: How much caffeine per day 2 1 cup of coffee   Who completed this questionnaire? The patient independently The patient with the help of someone because of physical limitation           Billing:    I spent a total of 40 minutes on the day of the visit.   Time spent by me doing chart review, history and exam, documentation and further activities per the note          Again, thank you for allowing me to participate in the care of your patient.        Sincerely,        Kaya Brenner PA-C

## 2024-02-02 NOTE — PROGRESS NOTES
__________________________________________________________      [facility]   [phone]  __________________________________________________________    Chief Complaint: Patient presents with:  Stroke: Stroke      History of Present Illness: Tc Garcia is a 83 year old male presenting for follow-up for intracranial hemorrhage.     He was hospitalized at [facility] on December 11, 2023. Prior to the hospital stay, he had a past medical history of atrial fibrillation on Eliquis, chronic renal failure, hyperlipidemia, *prior ischemic stroke with hemorrhagic conversion, hypertension, diabetes.  He had just been in our clinic about a month before that.    He presented to the hospital with dizziness, double vision, and nausea.     *January 2023, scattered areas of stroke felt to be cardioembolic due to atrial fibrillation and the missed doses of apixaban.  Also had right lateral ventricle IVH, felt to be due to hypertension and so the Eliquis was reversed with Kcentra.    Stroke Evaluation Summarized:    (Studies personally re-reviewed by me today or new results resulted and reviewed by me today are in BOLD below)    MRI/Head CT Recent follow-up head CT 1/28/2024 showed no acute intracranial abnormality   Intracranial Vasculature CTA head shows moderate presumed atherosclerotic narrowing of P2 segments over the PCAs bilaterally   Cervical Vasculature CTA neck shows plaque without stenosis of the ICAs bilaterally   I personally reviewed the following neuroimaging studies today and the comments above reflect my own personal interpretation of the images: images: CT head, MRI brain, and I also showed the CT to the patient myself today.    Echocardiogram EF 60 to 65%, severe biatrial enlargement, rhythm atrial fibrillation, mild to moderate tricuspid regurgitation   EKG/Telemetry Atrial fibrillation   Other Testing Not Applicable      Labs Lab Results   Component Value Date     LDL 54 12/12/2023    A1C 8.1 (H) 12/12/2023    CTROPT 30 (H) 12/11/2023    INR 1.35 (H) 12/11/2023    INR 1.13 01/15/2023        He saw cardiology on 1/29/2024, and has another appointment with him on 2/13/2024 to discuss in more detail about Watchman.  Overall, he has been feeling great.  He baked chocolate chip cookies by himself yesterday.  He has no headaches, no numbness, no tingling.  His blood pressure has been 1 17-1 50 systolic.  He has not felt lightheaded.  He had a fall this past Sunday when he missed a chair.  He went to the ER and had a follow-up head CT which looked good in terms of the amount of hemorrhage slowly disappearing.    He is about to move into [facility]    Impression/Plan:     1.  Recurrent intracranial hemorrhage into the cerebellum, suspected due to hypertension in the setting of chronic anticoagulation, previously on Eliquis,, versus traumatic hemorrhage after sudden onset of dizziness and fall    -Fortunately his blood pressure is mostly well-controlled.  While it is listed as 168 at this visit, we rechecked at the end and it was 130 systolic.  Continued good long-term blood pressure control.  -Continues to stay off anticoagulation.  Regarding the potential upcoming Watchman procedure, I would like to discuss with my attending Dr. Abdoul Hermosillo who is out of the office today about what the ideal timing for this would be.  Aware patient would have to be on some temporary antithrombotic agent.  Will arrange for Dr. Hermosillo to speak with Dr. Dubois about the patient having this procedure  -Continue physical and occupational therapy for any ongoing balance issues although he is really doing quite well with this  -With how good his recent head CT looked which was done after a fall, I do not think another CT is needed.    - Return in about 3 months (around 5/2/2024) for ideally Alena or  only, virtual ok. Call daughter Camille in demographics to arrange.    Stroke  Education provided.  He will call us with any questions.  For any acute neurologic deficits he was advised to  go directly to the hospital rather than call the clinic.    Kaya Brenner PA-C  Neurology  02/07/2024    ___________________________________________________________________    Current Medications  Current Outpatient Medications   Medication Sig    acetaminophen (TYLENOL) 325 MG tablet Take 2 tablets (650 mg) by mouth every 6 hours as needed for mild pain or fever    Alcohol Swabs PADS Use to swab the area of the injection or bina as directed Per insurance coverage    amLODIPine (NORVASC) 10 MG tablet Take 1 tablet (10 mg) by mouth daily    blood glucose (NO BRAND SPECIFIED) lancets standard To use to test glucose level in the blood Use to test blood sugar  4  times daily as directed. To accompany glucose monitor brands per insurance coverage.    blood glucose (NO BRAND SPECIFIED) test strip To use to test glucose level in the blood Use to test blood sugar  4 times daily as directed. To accompany glucose monitor brands per insurance coverage.    blood glucose monitoring (NO BRAND SPECIFIED) meter device kit Use as directed Per insurance coverage    carvedilol (COREG) 12.5 MG tablet Take 1 tablet (12.5 mg) by mouth 2 times daily (with meals)    Continuous Blood Gluc  (DEXCOM G6 ) JOSE Dexcom G6    USE EACH WITH 10 DAYS SENSORS    glucose (BD GLUCOSE) 4 g chewable tablet Take 4 tablets by mouth every 15 minutes as needed for low blood sugar    insulin aspart (NOVOLOG PEN) 100 UNIT/ML pen Inject 1-10 Units Subcutaneous Take with snacks or supplements for high blood sugar    insulin aspart (NOVOLOG PEN) 100 UNIT/ML pen Inject 1-7 Units Subcutaneous 3 times daily (before meals)    insulin aspart (NOVOLOG PEN) 100 UNIT/ML pen Inject 1-8 Units Subcutaneous daily (with breakfast)    insulin aspart (NOVOLOG PEN) 100 UNIT/ML pen Inject 1-8 Units Subcutaneous daily (with lunch)    insulin  "aspart (NOVOLOG PEN) 100 UNIT/ML pen Inject 1-8 Units Subcutaneous daily (with dinner)    insulin glargine (LANTUS PEN) 100 UNIT/ML pen Inject 7 Units Subcutaneous every 24 hours    insulin pen needle (32G X 4 MM) 32G X 4 MM miscellaneous Use as directed by provider Per insurance coverage    INSULIN PUMP - OUTPATIENT Medtronic device with no CGM and site change every 3 days   Sensitivity factor (midnight to midnight): 55  Bolus (units:gram): 1489-5039 = 1:9, 3518-0608 = 1:7, 4856-3060 = 1:7, 7286-7832 = 1:9, 9861-1272 = 1:13  Basal (units/hour): 1480-3557 = 0.45, 2820-0704 = 0.5, 2950-7405 = 0.625, 6132-3922 = 0.6, 3645-7583 = 0.45    irbesartan (AVAPRO) 300 MG tablet Take 1 tablet (300 mg) by mouth at bedtime    isosorbide mononitrate (IMDUR) 60 MG 24 hr tablet Take 1 tablet (60 mg) by mouth every evening    Lidocaine (LIDOCARE) 4 % Patch Place 1 patch onto the skin every 24 hours To prevent lidocaine toxicity, patient should be patch free for 12 hrs daily.    melatonin 10 MG TABS tablet Take 1 tablet (10 mg) by mouth at bedtime    mirtazapine (REMERON) 15 MG tablet Take 1 tablet (15 mg) by mouth at bedtime    ramelteon (ROZEREM) 8 MG tablet Take 1 tablet (8 mg) by mouth at bedtime    tamsulosin (FLOMAX) 0.4 MG capsule Take 1 capsule (0.4 mg) by mouth every morning    furosemide (LASIX) 20 MG tablet Take 1 tablet (20 mg) by mouth daily    insulin aspart (NOVOLOG PEN) 100 UNIT/ML pen Inject 1-5 Units Subcutaneous 2 times daily     No current facility-administered medications for this visit.       Physical Exam    Estimated body mass index is 21.71 kg/m  as calculated from the following:    Height as of this encounter: 1.753 m (5' 9\").    Weight as of this encounter: 66.7 kg (147 lb).    BP (!) 168/81   Pulse 69   Ht 1.753 m (5' 9\")   Wt 66.7 kg (147 lb)   SpO2 100%   BMI 21.71 kg/m         General Exam  General: Sitting up in chair in no acute distress    Neurologic:  Mental Status:  alert, oriented x 3, " follows commands, speech clear and fluent  Cranial Nerves:  visual fields intact, PERRL, EOMI with normal smooth pursuit, facial sensation intact and symmetric, facial movements symmetric, hearing not formally tested but intact to conversation, palate elevation symmetric and uvula midline, shoulder shrug strong bilaterally, tongue protrusion midline, mild nonspecific dysarthria  Motor:  normal muscle tone and bulk, no abnormal movements, able to move all limbs spontaneously, strength 5/5 throughout upper and lower extremities, no pronator drift  Reflexes:   deferrerd  Sensory:  light touch sensation intact and symmetric throughout upper and lower extremities  Coordination:  normal finger-to-nose and heel-to-shin bilaterally without dysmetria, rapid alternating movements symmetric  Station/Gait:   stable with walker    Questionnaires:        2/27/2023     8:51 AM 2/2/2024     3:09 PM   Stroke Questionnaire   Residual effects: Any residual effects from stroke? No, I feel totally back to how I was before the stroke Yes, I do   Residual effects: Most bothersome symptom  left side a little weaker and double vision   Level of independence: Could you live alone? Yes    Level of independence: Walking  Dependent   Level of independence: Eating  Need some help   Level of independence: Stairs  Dependent   Level of independence: Dressing  Independent   Level of independence: Bathing  Need some help   Level of independence: Toileting  Independent   Incontinence: Bowel?  Sometimes   Incontinence: Bladder?  Sometimes   Able to: Use electronics  Yes   Able to: Cook or do house chores  Yes   Able to: Do own shopping, including online  No   Able to: Manage own finances  No   Current therapy: Physical Therapy  No   Current therapy: Occupation Therapy  No   Current therapy: Speech Therapy  No   Current therapy: Other  No   Current services: Home Health  Yes: home health nurse comes to the home   Quality of Life: What work now or before  stroke Retired    Quality of Life: Current work status Retired    Quality of Life: How do you get around Drive myself    Quality of Life: Living situation before stroke With family or significant other    Quality of Life: Living situation now With family or significant other    Medication: How do you take your meds Myself, from a pillbox that I set up Myself, from a pillbox that someone else sets up   Medication: Do you ever miss or forget meds Rarely Rarely   Risk Factor: Checking blood pressure at home Yes Yes   Risk Factor: Usual blood pressure numbers 170/90 145 78   Risk Factor: Checking blood sugar at home Yes Yes   Risk Factor: Usual blood sugar numbers 100 to 150 100 to 150   Risk Factor: Second-hand smoke at home No No   Risk Factor: How much caffeine per day 2 1 cup of coffee   Who completed this questionnaire? The patient independently The patient with the help of someone because of physical limitation           Billing:    I spent a total of 40 minutes on the day of the visit.   Time spent by me doing chart review, history and exam, documentation and further activities per the note

## 2024-02-05 NOTE — PROGRESS NOTES
Clinic Care Coordination Contact  Follow Up Progress Note      Assessment: Call placed to pt to check-in. Pt stated that they are moving into their new assisted living facility today, it is called: [facility], a [facility]. Pt stated that both he and Radha are very excited. He stated that the food is wonderful there. There is an opportunity for them to have a provider (MD/NP) visit them at the [facility] but they do plan to still come to [facility] for now. Pt was appreciative for the call and denied having specific questions/concerns for SW at this time.    Care Gaps:    Health Maintenance Due   Topic Date Due    HF ACTION PLAN  Never done    DIABETIC FOOT EXAM  Never done    RSV VACCINE (Pregnancy & 60+) (1 - 1-dose 60+ series) Never done    ZOSTER IMMUNIZATION (2 of 3) 01/12/2011    MICROALBUMIN  05/04/2020    EYE EXAM  04/12/2023    MEDICARE ANNUAL WELLNESS VISIT  08/11/2023    COVID-19 Vaccine (6 - 2023-24 season) 09/01/2023     Intervention/Education provided during outreach: Supportive check-in.    Plan:   Care Coordinator will follow up in 1 month.    Ronda Enrique [facility]  Social Work Care Coordinator  Phone: [phone]

## 2024-02-10 PROBLEM — Z79.4 TYPE 2 DIABETES MELLITUS WITH HYPERGLYCEMIA, WITH LONG-TERM CURRENT USE OF INSULIN (H): Status: ACTIVE | Noted: 2024-01-01

## 2024-02-10 PROBLEM — E10.9: Status: ACTIVE | Noted: 2024-01-01

## 2024-02-10 PROBLEM — E11.65 TYPE 2 DIABETES MELLITUS WITH HYPERGLYCEMIA, WITH LONG-TERM CURRENT USE OF INSULIN (H): Status: ACTIVE | Noted: 2024-01-01

## 2024-02-10 PROBLEM — R73.09 LABILE BLOOD GLUCOSE: Status: ACTIVE | Noted: 2024-01-01

## 2024-02-11 PROBLEM — I50.32 CHRONIC DIASTOLIC HEART FAILURE (H): Status: ACTIVE | Noted: 2021-06-09

## 2024-02-11 PROBLEM — I27.20 PULMONARY HYPERTENSION (H): Status: ACTIVE | Noted: 2020-05-10

## 2024-02-11 NOTE — ED TRIAGE NOTES
Pt reports that his glucose was 29 last night and the staff had to give him 3 glasses of OJ. Today, per EMS his glucose was 516

## 2024-02-11 NOTE — PLAN OF CARE
VSS, not in pain at time of discharge. Pt states all belongings and paperwork are accounted for. Medications are to be filled by pharmacy at his [facility]. Discharge education complete including meds, follow up and all instructions. No further questions on discharge information. Diabetes mangement gone over. Pt understands when/ho to check his sugar, and how to treat low sugars. Patient left premises in blue/white taxi.

## 2024-02-11 NOTE — CONSULTS
Care Management Initial Consult    General Information  Assessment completed with: Care Team Member, Amaris PALMER nurse  Type of CM/SW Visit: Initial Assessment  Primary Care Provider verified and updated as needed: Yes (Jessika Cordoba [phone])   Readmission within the last 30 days: current reason for admission unrelated to previous admission   Return Category: New Diagnosis  Reason for Consult: discharge planning  Advance Care Planning:    Health Care Directive 01/25/2024 on file in EPIC    Communication Assessment  Patient's communication style: spoken language (English or Bilingual)    Hearing Difficulty or Deaf: no   Wear Glasses or Blind: yes    Cognitive  Cognitive/Neuro/Behavioral: WDL                      Living Environment:   People in home: spouse     Current living Arrangements: assisted living  Name of Facility: [facility] (a [employer] [facility]) , phone [phone]   Able to return to prior arrangements: yes     Family/Social Support:  Care provided by: self  Provides care for: spouse, pet(s) (cat)  Marital Status:   Facility resident(s)/Staff          Description of Support System: Supportive, Involved    Support Assessment: Adequate family and caregiver support    Current Resources:   Patient receiving home care services: No (but [facility] is available in the building)  Community Resources: Other (see comment) ([facility] services for meals, housekeeping, ADLs available)  Equipment currently used at home: walker, rolling  Supplies currently used at home: Diabetic Supplies    Employment/Financial:  Employment Status: retired     Finance Comments: MEDICA/[employer]ICA ADV[employer] SOL[employer]IONS  Does the patient's insurance plan have a 3 day qualifying hospital stay waiver?  Yes   Which insurance plan 3 day waiver is available? Alternative insurance waiver  Will the waiver be used for post-acute placement? No    Lifestyle & Psychosocial Needs:  Social Determinants of Health     Food  Insecurity: Low Risk  (10/16/2023)    Food Insecurity     Within the past 12 months, did you worry that your food would run out before you got money to buy more?: No     Within the past 12 months, did the food you bought just not last and you didn t have money to get more?: No   Depression: Not at risk (2/2/2024)    PHQ-2     PHQ-2 Score: 0   Housing Stability: Low Risk  (10/16/2023)    Housing Stability     Do you have housing? : Yes     Are you worried about losing your housing?: No   Tobacco Use: Medium Risk (2/2/2024)    Patient History     Smoking Tobacco Use: Former     Smokeless Tobacco Use: Never     Passive Exposure: Not on file   Financial Resource Strain: Low Risk  (10/16/2023)    Financial Resource Strain     Within the past 12 months, have you or your family members you live with been unable to get utilities (heat, electricity) when it was really needed?: No   Alcohol Use: Not At Risk (10/27/2020)    AUDIT-C     Frequency of Alcohol Consumption: Never     Average Number of Drinks: Not on file     Frequency of Binge Drinking: Not on file   Transportation Needs: Low Risk  (10/16/2023)    Transportation Needs     Within the past 12 months, has lack of transportation kept you from medical appointments, getting your medicines, non-medical meetings or appointments, work, or from getting things that you need?: No   Physical Activity: Not on file   Interpersonal Safety: Not on file   Stress: Not on file   Social Connections: Not on file       Functional Status:  Prior to admission patient needed assistance:   Dependent ADLs:: Ambulation-walker, Bathing  Dependent IADLs:: Medication Management, Cleaning  Assesssment of Functional Status: At functional baseline    Mental Health Status:  Mental Health Status: No Current Concerns       Chemical Dependency Status:  Chemical Dependency Status: No Current Concerns           Values/Beliefs:  Spiritual, Cultural Beliefs, Holiness Practices, Values that affect care: no   "Description of Beliefs that Will Affect Care: Zoroastrianism       Values/Beliefs Comment: Confucianism    Additional Information:  CM team called (facility name) [facility] (a [facility]) , phone [phone] and requested to speak with staff who normally care for pt, to request background information on pt's baseline and place of living.  Spoke with Amaris RUFFIN on duty who provided the following information:     In what kind of facility does pt reside?  Facility Type: [facility] (assisted living) - \"home discharge\"    Name of building/unit: [facility] apartment  What do you know about pt's baseline cognition and mobility? Mobility = independent, benefits from assistance; admits to being weak/unable to coordinate ambulation and his walker (keeps bumping into walls, skin tears); alert/oriented; he cares for their cat in the apartment; his wife has dementia and he does some cuing / set up clothes (or she wears the same thing); wife frequently says \"I'm leaving\" and he helps redirect that.    What level of cognition/mobility is required for safe return? He is at a Level 3 right now; can go up to Level 5 which is total care    Is resident enrolled in any services?  med management, anticoagulation management (lovenox), diabetes management; no meals (but we do escort his wife); SBA bathing; call pendant, housekeeping   Are skilled home health or outpatient therapy services available there? Yes  agency, if known: [facility] - but pt not open to them right now  Is the resident seen by their PCP on-site or off-site?  Off-site for now  Any built in grab bars in unit (describe): Yes  Any raised toilet seat or shower chair (describe)? Yes  Does pt have any personal DME (describe)? Yes--walker  Best number to reach facility nursing? Phone [phone]  Fax:  [phone]   What does the facility need faxed to them from us at discharge?   MD Orders, MAR, H&P, discharge summary, consults, therapy notes  Does facility manage " medications?   Yes If yes, contracted pharmacy? Name:  [facility] [phone]    If new Rx meds at discharge, fill at hospital pharmacy or contracted pharmacy?   Send to [facility] pharmacy    Request copy of POLST / Health Care Directive / Guardianship documents to (provide unit fax) or email directly to [email or web address] (if not present in Epic)  What is the preferred return time for the resident?  Before 7pm; note their pharmacy does not do weekend deliveries.     Routed notes and information to [facility] and provided callback contact information in this fax.    Pt/family was provided with the Medicare Compare list for Home Care.  Discussed associated Medicare star ratings to assist with choice for referrals/discharge planning Yes; Education was given to pt/family that star ratings are updated/maintained by Medicare and can be reviewed by visiting www.medicare.gov Yes .  Referral routed to ACFV team with noted preference for [facility] and updated discharge address.      Care Management Discharge Note    Discharge Date: 02/12/2024       Discharge Disposition: Assisted Living    Discharge Services:  (Home Care)    Discharge DME: Walker    Discharge Transportation:      Private pay costs discussed: transportation costs    Does the patient's insurance plan have a 3 day qualifying hospital stay waiver?  Yes     Which insurance plan 3 day waiver is available? Alternative insurance waiver    Will the waiver be used for post-acute placement? No    PAS Confirmation Code: na  Patient/family educated on Medicare website which has current facility and service quality ratings: yes    Education Provided on the Discharge Plan: Yes  Persons Notified of Discharge Plans: patient, [facility], requested homecare   Patient/Family in Agreement with the Plan:      Handoff Referral Completed: Yes    Additional Information:  CM-RN will route discharge orders to [facility] at number above when available.   Pt would like to call a cab to get home;  he has his walker with him.    Bedside RN updated.        Lore White RN, BSN, PHN  [facility]  Inpatient Care Management - [facility] RN Mobile: [phone] daily 7:30-4:00

## 2024-02-11 NOTE — DISCHARGE SUMMARY
"[facility]  Hospitalist Discharge Summary      Date of Admission:  2/10/2024  Date of Discharge:  2/11/2024 returning to assisted living  Discharging Provider: Lena Fernandez MD  Discharge Service: Hospitalist Service    Discharge Diagnoses   Type 1 diabetes, uncontrolled, brittle  Hypoglycemia  Hypertension  Dyslipidemia  Chronic atrial fibrillation  HFpEF  Pulmonary Hypertension  Recurrent left sided pleural effusion  CKD stage 3b  Chronic anemia  Mood D/O  History of ICH (12/2023)    Clinically Significant Risk Factors     # DMII: A1C = 7.7 % (Ref range: <5.7 %) within past 6 months       Follow-ups Needed After Discharge   Follow-up Appointments     Follow-up and recommended labs and tests       Follow up with primary care provider, Jessika Cordoba, within 7   days for hospital follow- up.  No follow up labs or test are needed.      Follow up with endocrinology at next available appointment within 1 week   for brittle diabetes management. Noted reduced dose of lantus.            Unresulted Labs Ordered in the Past 30 Days of this Admission       No orders found from 1/11/2024 to 2/11/2024.        These results will be followed up by myself    Discharge Disposition   Discharged to assisted living  Condition at discharge: Stable    Hospital Course   Please see H&P for details of history of present illness.    Had been in new assisted living facility for several days, where he'd moved with his wife who has significant dementia.    Diabetes, difficult to control  Admitted to [facility] status for continuous insulin infusion adjustment. Longstanding history of \"brittle\" diabetes, with numerous prior hospitalizations for challenging diabetes management.    Of note, had recent prolonged hospitalization at [facility], followed by ~ 1 month Acute Rehab Stay after intracranial hemorrhage. Noteably had significant involvement from endocrinology team throughout both hospitalizations due to " significant variation in blood glucoses. Deemed not appropriate for insulin pump due to cognitive concerns with being able to manage it himself.     In the future for brittle diabetic encounters, consider transfer to [facility] for focused endocrinology consultation. This type of consultation is NOT AVAILABLE at [facility], nor are there any diabetes educators.     Discharged after 1 midnight due to relatively stable blood sugars, and in the context of the patient requesting to urgently discharge to return to assisted living with his wife who had a behavioral decompensation of dementia. Discussed at length that he has medical help with insulin dosing and timing at the assisted living facility an was deemed safe for discharge.     UTI with encephalopathy, resolved  Noted to have urinary tract infection, > 100k klebsiella pneumonia, sensitive to all antibiotics tested except ampicillin. Had confusion and weakness that improved with IV antibiotics.   -keflex x 7 days at discharge    Lives with wife who has advanced dementia at both reside at [facility]. **Significant factor to consider when future admissions are proposed.**    Consultations This Hospital Stay   PHARMACY IP CONSULT  PHARMACY IP CONSULT  PHYSICAL THERAPY ADULT IP CONSULT  OCCUPATIONAL THERAPY ADULT IP CONSULT  CARE MANAGEMENT / SOCIAL WORK IP CONSULT    Code Status   No CPR- Do NOT Intubate    Time Spent on this Encounter   I, Lena Fernandez MD, personally saw the patient today and spent greater than 30 minutes discharging this patient.       Lena Fernandez MD  [facility] CORONARY CARE UNIT  [address] [place] [address], [address]  [facility] [address]  Phone: [phone]  ______________________________________________________________________    Physical Exam   Vital Signs:                    Weight: 145 lbs 9.6 oz         Primary Care Physician   Jessika Cordoba    Discharge Orders      Home Care Referral      Primary Care - Care Coordination  Referral      Reason for your hospital stay    Urinary trace infection, low blood sugars     Follow-up and recommended labs and tests     Follow up with primary care provider, Jessika Cordoba, within 7 days for hospital follow- up.  No follow up labs or test are needed.      Follow up with endocrinology at next available appointment within 1 week for brittle diabetes management. Noted reduced dose of lantus.     Activity    Your activity upon discharge: activity as tolerated     No CPR- Do NOT Intubate     Diet    Follow this diet upon discharge:      Combination Diet Moderate Consistent Carb (60 g CHO per Meal) Diet; Low Saturated Fat Na <2400mg Diet, Low Saturated Fat Diet       Significant Results and Procedures   See Epic    Discharge Medications   Discharge Medication List as of 2/11/2024 12:50 PM        START taking these medications    Details   cephALEXin (KEFLEX) 500 MG capsule Take 1 capsule (500 mg) by mouth 4 times daily for 7 days, Disp-28 capsule, R-0, Local Print           CONTINUE these medications which have CHANGED    Details   insulin glargine (LANTUS PEN) 100 UNIT/ML pen Inject 7 Units Subcutaneous every evening, HistoricalIf Lantus is not covered by insurance, may substitute Basaglar or Semglee or other insulin glargine product per insurance preference at same dose and frequency.        Lidocaine (LIDOCARE) 4 % Patch Place 1 patch onto the skin daily as needed for moderate pain To prevent lidocaine toxicity, patient should be patch free for 12 hrs daily.Historical      ramelteon (ROZEREM) 8 MG tablet Take 1 tablet (8 mg) by mouth nightly as needed for sleep, Historical           CONTINUE these medications which have NOT CHANGED    Details   amLODIPine (NORVASC) 10 MG tablet Take 1 tablet (10 mg) by mouth daily, Disp-30 tablet, R-0, E-Prescribe      carvedilol (COREG) 12.5 MG tablet Take 1 tablet (12.5 mg) by mouth 2 times daily (with meals), Disp-60 tablet, R-0, E-Prescribe       furosemide (LASIX) 20 MG tablet Take 1 tablet (20 mg) by mouth daily, Disp-90 tablet, R-3, E-Prescribe      insulin lispro (HUMALOG KWIKPEN) 100 UNIT/ML (1 unit dial) KWIKPEN Inject Subcutaneous 3 times daily (before meals) -299: 2 units, -349: 3 units, -399: 4 units,  or greater: 5 units, Historical      irbesartan (AVAPRO) 300 MG tablet Take 1 tablet (300 mg) by mouth at bedtime, Disp-30 tablet, R-0, E-Prescribe      isosorbide mononitrate (IMDUR) 60 MG 24 hr tablet Take 1 tablet (60 mg) by mouth every evening, Disp-30 tablet, R-0, E-Prescribe      mirtazapine (REMERON) 15 MG tablet Take 1 tablet (15 mg) by mouth at bedtime, Disp-30 tablet, R-0, E-Prescribe      tamsulosin (FLOMAX) 0.4 MG capsule Take 1 capsule (0.4 mg) by mouth every morning, Disp-30 capsule, R-0, E-Prescribe      Alcohol Swabs PADS Use to swab the area of the injection or bina as directed Per insurance coverage, Disp-100 each, R-0, LUCA, E-Prescribe      blood glucose (NO BRAND SPECIFIED) lancets standard To use to test glucose level in the blood Use to test blood sugar  4  times daily as directed. To accompany glucose monitor brands per insurance coverage.Disp-100 each, [id number]      blood glucose (NO BRAND SPECIFIED) test strip To use to test glucose level in the blood Use to test blood sugar  4 times daily as directed. To accompany glucose monitor brands per insurance coverage., Disp-100 strip, R-0, E-Prescribe      blood glucose monitoring (NO BRAND SPECIFIED) meter device kit Use as directed Per insurance coverageDisp-1 kit, [id number]      Continuous Blood Gluc  (DEXCOM G6 ) JOSE Dexcom G6    USE EACH WITH 10 DAYS SENSORS, Historical      glucose (BD GLUCOSE) 4 g chewable tablet Take 4 tablets by mouth every 15 minutes as needed for low blood sugar, Disp-50 tablet, R-0, E-Prescribe      insulin pen needle (32G X 4 MM) 32G X 4 MM miscellaneous Use as directed by provider Per  insurance coverageDisp-100 each, [id number]           Allergies   Allergies   Allergen Reactions    No Known Drug Allergy

## 2024-02-11 NOTE — PHARMACY-ADMISSION MEDICATION HISTORY
Pharmacist Admission Medication History    Admission medication history is complete. The information provided in this note is only as accurate as the sources available at the time of the update.    Information Source(s): Facility ([facility]/) medication list/MAR via N/A    Changes made to PTA medication list:  Added: None  Deleted: insulin pump  Changed: Lantus 7 units > 9 units     Allergies reviewed with patient and updates made in EHR: unable to assess    Medication History Completed By: Jacinta Elliott [facility] 2/10/2024 8:53 PM    PTA Med List   Medication Sig Last Dose    amLODIPine (NORVASC) 10 MG tablet Take 1 tablet (10 mg) by mouth daily     carvedilol (COREG) 12.5 MG tablet Take 1 tablet (12.5 mg) by mouth 2 times daily (with meals)     furosemide (LASIX) 20 MG tablet Take 1 tablet (20 mg) by mouth daily     insulin glargine (LANTUS PEN) 100 UNIT/ML pen Inject 9 Units Subcutaneous every evening 2/9/2024 at 2000    insulin lispro (HUMALOG KWIKPEN) 100 UNIT/ML (1 unit dial) KWIKPEN Inject Subcutaneous 3 times daily (before meals) -299: 2 units, -349: 3 units, -399: 4 units,  or greater: 5 units 2/10/2024 at 1600    irbesartan (AVAPRO) 300 MG tablet Take 1 tablet (300 mg) by mouth at bedtime     isosorbide mononitrate (IMDUR) 60 MG 24 hr tablet Take 1 tablet (60 mg) by mouth every evening (Patient taking differently: Take 60 mg by mouth daily)     Lidocaine (LIDOCARE) 4 % Patch Place 1 patch onto the skin every 24 hours To prevent lidocaine toxicity, patient should be patch free for 12 hrs daily. (Patient taking differently: Place 1 patch onto the skin daily as needed To prevent lidocaine toxicity, patient should be patch free for 12 hrs daily.)     mirtazapine (REMERON) 15 MG tablet Take 1 tablet (15 mg) by mouth at bedtime     ramelteon (ROZEREM) 8 MG tablet Take 1 tablet (8 mg) by mouth at bedtime (Patient taking differently: Take 8 mg by mouth nightly as needed)     tamsulosin (FLOMAX)  0.4 MG capsule Take 1 capsule (0.4 mg) by mouth every morning

## 2024-02-11 NOTE — PROGRESS NOTES
02/11/24 0915   Appointment Info   Signing Clinician's Name / Credentials (PT) Naz Pfeiffer, PT, DPT   Living Environment   People in Home spouse   Current Living Arrangements assisted living   Home Accessibility no concerns   Living Environment Comments Pt and spouse just recently moved to assisted living facility in [place]. Prior to this they lived in two [facility] in [place].   Self-Care   Usual Activity Tolerance good   Current Activity Tolerance moderate   Regular Exercise No   Equipment Currently Used at Home walker, rolling   Fall history within last six months no   Activity/Exercise/Self-Care Comment Pt reports he has been using a FWW at all times now, often gets wheeled down to dining room for meals by [facility] staff. Pt is main caregiver for his spouse who has dementia, he is anxious to get home because of this.   General Information   Onset of Illness/Injury or Date of Surgery 02/10/24   Referring Physician Chacho Salmon PA-C   Patient/Family Therapy Goals Statement (PT) To get home as soon as possible   Pertinent History of Current Problem (include personal factors and/or comorbidities that impact the POC) Pt is 83 year old male adm on 2/10/24 due to labile blood glucose levels as high as 500's dropping to as low as 20's, difficulty controlling blood sugars at [facility].   Existing Precautions/Restrictions fall   Cognition   Affect/Mental Status (Cognition) WFL   Orientation Status (Cognition) oriented x 3   Follows Commands (Cognition) WFL   Pain Assessment   Patient Currently in Pain No   Integumentary/Edema   Integumentary/Edema no deficits were identifed   Posture    Posture Forward head position;Protracted shoulders   Range of Motion (ROM)   Range of Motion ROM is WFL   Strength (Manual Muscle Testing)   Strength (Manual Muscle Testing) Deficits observed during functional mobility   Strength Comments Pt is generally deconditioned, no focal weakness noted, can lift B LE against gravity    Bed Mobility   Comment, (Bed Mobility) SBA   Transfers   Comment, (Transfers) CGA   Gait/Stairs (Locomotion)   Comment, (Gait/Stairs) Min assist with FWW   Balance   Balance Comments Posterior lean upon first standing   Clinical Impression   Criteria for Skilled Therapeutic Intervention Yes, treatment indicated   PT Diagnosis (PT) Impaired mobility   Influenced by the following impairments Decreased strength, decreased balance, decreased activity tolerance   Functional limitations due to impairments Decreased ability to participate in daily tasks   Clinical Presentation (PT Evaluation Complexity) stable   Clinical Presentation Rationale Current presentation, [facility]   Clinical Decision Making (Complexity) low complexity   Planned Therapy Interventions (PT) balance training;bed mobility training;gait training;home exercise program;patient/family education;strengthening;transfer training   Risk & Benefits of therapy have been explained evaluation/treatment results reviewed;care plan/treatment goals reviewed;risks/benefits reviewed;current/potential barriers reviewed;participants voiced agreement with care plan;participants included;patient   PT Total Evaluation Time   PT Eval, Low Complexity Minutes (10797) 10   Physical Therapy Goals   PT Frequency Daily   PT Predicted Duration/Target Date for Goal Attainment 02/24/24   PT: Bed Mobility Independent;Supine to/from sit;Rolling   PT: Transfers Modified independent;Sit to/from stand;Bed to/from chair;Assistive device   PT: Gait Modified independent;Greater than 200 feet;Rolling walker   Therapeutic Activity   Therapeutic Activities: dynamic activities to improve functional performance Minutes (22321) 24   Symptoms Noted During/After Treatment None   Treatment Detail/Skilled Intervention Pt supine in bed on arrival, agreeable to participate. Pt states he needs to get home as soon as possible as his spouse has difficulty managing without him there. Reassurance provided. Pt  completes bed mobility with SBA, some cueing needed due to lines but no physical assist. Pt sit to stand from EOB with SBA but posterior weight shift, cues to bring weight forward. Pt able to perform stand pivot transfer bed to chair with CGA, moves slowly but safely. Pt sit to stand from chair x2 repetitions with FWW and SBA, good recall of hand placement. Pt able to ambulate 5' forward/backward with FWW and min assist, limited as pt's breakfast tray arriving and pt wanting to eat. At completion of session, pt OOB in chair, needs within reach, fall alarm activated.   PT Discharge Planning   PT Plan Progress ambulation distance, general strengthening and balance training   PT Discharge Recommendation (DC Rec) home with assist;home with home care physical therapy   PT Rationale for DC Rec Pt appears to be near baseline level of function but does demonstrate balance deficits as well as decreased activity tolerance. Pt also with rapidly changing blood glucose levels. Pt and spouse recently (2/1/24) moved into [facility]. Anticipate pt could return to [facility] and have home PT services to further address mobility deficits. Pt stating he is unsure if he and his spouse will stay at the [facility] or if they will go back to their home in [place]. At this time, would not recommend returning to independent living in own home as a safe discharge option as pt would not have assistance.   PT Brief overview of current status CGA to min assist with FWW   Total Session Time   Timed Code Treatment Minutes 24   Total Session Time (sum of timed and untimed services) 34

## 2024-02-11 NOTE — PLAN OF CARE
Neuro: A&Ox4  Tele/Cardiac: A-fib CVR  Resp: RA, denies LUCIANO/SOB  Activity: A1 gb/walker  Pain: denies  Drips/IV: insulin gtt titrated per algorithm protocol  GI/: frequent voiding/BM this morning.  Skin: BLE edema +2/+3, scattered bruising/scabs  Diet: Diet: Combination Diet Moderate Consistent Carb (60 g CHO per Meal) Diet; Low Saturated Fat Na <2400mg Diet, Low Saturated Fat Diet     Test/Procedures: n/a  Plan: monitor BG and titrate insulin as tolerated.

## 2024-02-11 NOTE — H&P
[facility]  History and Physical - Hospitalist Service       Date of Admission:  2/10/2024  PRIMARY CARE PROVIDER:    Jessika Cordoba    Assessment & Plan   Tc Garcia is a 83 year old male admitted on 2/10/2024.    Past medical history significant for HTN, HLP, Chronic atrial fib, HFpEF, Pulmonary HTN, Recurrent left sided pleural effusion, CKD stage 3b, Chronic anemia, Rosacea, Vitamin D Def, Insomnia, Mood D/O, History of ICH (12/2023), History of CVA who was admitted to [facility] due to labile blood glucose in the setting of UTI with mild ABBIE.      Discussed with Dr. Drummond and reviewed ED notes.  Patient presented to the ED due to blood glucose issues.  Patient reported that he has been experiencing labile blood sugars with readings ranging between 29 and the 350s.  He specifically indicated that the evening of 2/9 at bedtime his blood sugar was in the 350s and then he awoke in the middle of the night due to severe sweating.  Staff at his [facility] checked his blood sugars and it was found to be low at 29.  He was given 3 glasses of orange juice over a 45-minute period of time and no other interventions were performed.  The morning of presentation the patient received his regular dose of insulin before breakfast and lunch.  Staff at [facility] checked his blood sugars before dinner and he was found to have a rapidly rising blood glucose level at 199.  Due to these labile swings staff at the facility sent the patient to the ED.    Work-up in the ED included a CMP that revealed a chloride of 97, BUN of 33.4, creatinine of 1.84 with a GFR of 36, total protein of 6.3 and glucose of 593.  Quantitative ketones were within normal limits at 0.2.  CBC with differential revealed a hemoglobin of 9.8, hematocrit of 31.3, RBC count of 3.49, MCHC of 31.3 and RDW of 18 otherwise within normal limits.    Patient received 2 L IV Fluid bolus while in the ED.  After discussion  with Dr. Drummond patient will go to [facility] on an insulin infusion.  UA was ordered and will need to follow up on results.      Labile blood glucose   DM2, brittle insulin dependent  - Hold PTA  - Insulin infusion.    - Glucose checks ordered:  --Q1H until BG is stable within 100-150 mg/dL x 4, then Q2H until insulin infusion is discontinued.   --If subsequent BG values are outside the 100-150 mg/dL range, measure BG Q1H.   - BMP ordered every 6 hours for 4 occurrences.      UTI  *UA abnormal with cloudy appearance, >1000 glucose, 10 protein albumin, trace blood, large LE with WBC >182, RBC of 5, with few bacteria and WBC clumps present.    - Start IV ceftriaxone.    - Follow up on urine culture.      ABBIE  CKD stage 3b  Baseline creatinine 1.3-1.5.   Patient received a total of 2 L of NS in the ED.    - Avoid nephrotoxic agents as able.    - Hold PTA Lasix 20 mg/d as well as Avapro 60 mg/d.  Likely resume in next 24-48 hours.  - BMP ordered for the morning.      HTN  - SBP Goal between 130-150 per Neurology recommendations following recent ICH.    - Resumed on PTA amlodipine 10 mg/d, Coreg 12.5 mg BID, Imdur 60 mg/d.  Hold parameters in place.    - Hold PTA Lasix 20 mg/d as well as Avapro 60 mg/d due to mild ABBIE.  Likely resume in next 24-48 hours.    - IV Hydralazine available for SBP GREATER THAN 170.    - Vitals every 4 hours.      HLP  *Not on statin therapy.  No interventions.      Chronic atrial fib  *PTA Eliquis was stopped in 12/2023 following mechanical fall resulting in ICH and possible left medial frontal lobe CVA.  Patient was seen by Cardiology as an outpatient 1/29/2024 and currently considering watchman implantation due to [id number] score of 5.   - Resumed on PTA Coreg as above.  - Monitor on telemetry.     Chronic HFpEF  Peripheral edema  *Previous EF of 60-65% from 12/2023.    *Per Cardiology note 1/29/2024 patient was started on lasix 20 mg/d due to peripheral edema  - Resumed on PTA Coreg.  Hold  parameters in place.    - Hold PTA lasix and Avapro.    - Monitor daily weights (standing).  - Strict I's and O's monitored.   - Cardiac diet (2400 mg sodium daily limit and low fat).    Pulmonary HTN  - Continue with outpatient surveillance.      Heart murmur  *Chart review shows a murmur has been appreciated intermittently since 2020.  Murmur was appreciated on exam performed at time of admission.      Recurrent/chronic transudative left sided pleural effusion  *Noted on chart review.  Previously required pleurex cath and chest tube placement.      Chronic Anemia  Baseline HGB 8.5-11.4 since 10/2023.  - Monitor PRN while hospitalized and follow-up with PCP for continued surveillance.      Rosacea  *Noted on chart review and patient is not currently on any medications.      Vitamin D Def  *Noted on chart review and patient is not currently on any medications.      Insomnia   Mood D/O unspecified  - Resumed on PTA Remeron 15 mg at bedtime.    - Hold PTA PRN ramelteon 8 mg at bedtime.  Resume at discharge or if patient requests.      History of ICH (12/2023)  History of ischemic CVA with hemorrhagic conversion   *Resumed on PTA statin and antihypertensives as above.        Clinically Significant Risk Factors Present on Admission                 # Acute Kidney Injury, unspecified: based on a >150% or 0.3 mg/dL increase in last creatinine compared to past 90 day average, will monitor renal function  # Hypertension: Noted on problem list  # Chronic heart failure with preserved ejection fraction: heart failure noted on problem list and last echo with EF >50%    # DMII: A1C = 8.1 % (Ref range: <5.7 %) within past 6 months       # Financial/Environmental Concerns:                Diet: Combination diet  DVT Prophylaxis: Pneumatic Compression Devices  Pruett Catheter: Not present  Lines: None     Cardiac Monitoring: Ordered  Code Status: DNR/DNI         Disposition Plan   Inpatient status.  Anticipate greater than 2 evening  hospitalization due to labile blood glucose levels with most recent readings of hyperglycemia in the setting of UTI and mild ABBIE.    The patient's care was discussed with the Bedside Nurse, Patient, and Dr. Drummond, Pharmacy .    The patient has been discussed with Dr. Hassan, who agrees with the assessment and plan at this time.    Chacho Salmon PA-C  [facility]  Securely message with the Vocera Web Console (learn more here)  Text page via [email or web address] Paging/Directory    ______________________________________________________________________    Chief Complaint   Labile blood sugars    History is obtained from Dr. Drummond, the patient and EMR.      History of Present Illness   Tc Garcia is a 83 year old male with a past medical history significant for HTN, HLP, Chronic atrial fib, HFpEF, Pulmonary HTN, Recurrent left sided pleural effusion, CKD stage 3b, Chronic anemia, Rosacea, Vitamin D Def, Insomnia, Mood D/O, History of ICH (12/2023), History of CVA who was admitted to [facility] due to labile blood glucose in the setting of UTI with mild ABBIE.      Discussed with Dr. Drummond and reviewed ED notes.  Patient presented to the ED due to blood glucose issues.  Patient reported that he has been experiencing labile blood sugars with readings ranging between 29 and the 350s.  He specifically indicated that the evening of 2/9 at bedtime his blood sugar was in the 350s and then he awoke in the middle of the night due to severe sweating.  Staff at his assisted living checked his blood sugars and it was found to be low at 29.  He was given 3 glasses of orange juice over a 45-minute period of time and no other interventions were performed.  The morning of presentation the patient received his regular dose of insulin before breakfast and lunch.  Staff at [facility] checked his blood sugars before dinner and he was found to have a rapidly rising blood glucose level at 199.  Due to these  [facility] staff at the facility sent the patient to the ED.    Work-up in the ED included a CMP that revealed a chloride of 97, BUN of 33.4, creatinine of 1.84 with a GFR of 36, total protein of 6.3 and glucose of 593.  Quantitative ketones were within normal limits at 0.2.  CBC with differential revealed a hemoglobin of 9.8, hematocrit of 31.3, RBC count of 3.49, MCHC of 31.3 and RDW of 18 otherwise within normal limits.    Patient received 1 L IV Fluid bolus while in the ED.  After discussion with Dr. Drummond patient will go to [facility] on an insulin infusion.  UA was ordered and will need to follow up on results.      Patient was seen in the ED where he was resting comfortably on the gurney upon arrival.  Initially, we reviewed his medical history and events that led to patient's presentation.  During this discussion patient indicated that he had significantly elevated blood glucose levels for the past 3 nights prior to going to bed and thought that his blood glucose level the previous night was potentially in the 500s.  He subsequently awoke in the early morning on day of presentation due to significant sweating and was found to have a blood glucose of 29.    Upon questioning, patient indicates that he has had increased fatigue.  He recently stumbled into a wall which resulted in an abrasion on his hands and left elbow.  Patient states that he has double vision and difficulty focusing his vision ever since his previous stroke last year.  Patient states he has had ongoing issues with swelling of his lower extremities with potential worsening recently.  He does develop some degree of shortness of breath when walking upstairs.  He feels he bruises and bleeds quite easily.  He does have a history of seizure but this occurred in the setting of ICH.    Patient resides in an assisted living facility in [place] with his wife.  He indicated he has no history of current use of tobacco products.  He has a very remote history  "of alcohol consumption but stated that this occurred \"way back\".  He does not utilize recreational drugs.  He ambulates with the use of a walker.  He does not utilize supplemental oxygen or CPAP machine.    Discussed and reviewed CODE STATUS and patient elected to be DNR DNI.      Past Medical History    I have reviewed this patient's medical history and updated it with pertinent information if needed.   Past Medical History:   Diagnosis Date    Anemia     Anemia of chronic disease 5/14/2020    Back pain     CKD (chronic kidney disease) stage 3, GFR 30-59 ml/min (H)     CRF (chronic renal failure), stage 3  5/14/2020    Fall 11/2019    suffered multiple left rib fractures, C3 and T2 fractures    Mixed hyperlipidemia 7/7/2004    Paroxysmal atrial fibrillation (H)     Personal history of colonic polyps 1972    gets colonoscopy every five years, due in 2006    Pleural effusion on left 11/2019    after multiple rib fractures    Pulmonary hypertension (H) 5/10/2020    Added automatically from request for surgery [id number]    Recurrent Left Pleural effusion -- S/P Pleurex Cath 5/12/20 12/30/2019    Rosacea 1989    Type II or unspecified type diabetes mellitus without mention of complication, not stated as uncontrolled 1999    Unspecified essential hypertension 1994   HTN, HLP, Chronic atrial fib, HFpEF, Pulmonary HTN, Recurrent left sided pleural effusion, CKD stage 3b, Chronic anemia, Rosacea, Vitamin D Def, Insomnia, Mood D/O, History of ICH (12/2023), History of CVA who was admitted to [facility] due to labile blood glucose in the setting of UTI with mild ABBIE.      Prior to Admission Medications   Prior to Admission Medications   Prescriptions Last Dose Informant Patient Reported? Taking?   Alcohol Swabs PADS   No No   Sig: Use to swab the area of the injection or bina as directed Per insurance coverage   Continuous Blood Gluc  (DEXCOM G6 ) JOSE   Yes No   Sig: Dexcom G6    USE EACH WITH 10 DAYS " SENSORS   Lidocaine (LIDOCARE) 4 % Patch   No Yes   Sig: Place 1 patch onto the skin every 24 hours To prevent lidocaine toxicity, patient should be patch free for 12 hrs daily.   Patient taking differently: Place 1 patch onto the skin daily as needed To prevent lidocaine toxicity, patient should be patch free for 12 hrs daily.   amLODIPine (NORVASC) 10 MG tablet   No Yes   Sig: Take 1 tablet (10 mg) by mouth daily   blood glucose (NO BRAND SPECIFIED) lancets standard   No No   Sig: To use to test glucose level in the blood Use to test blood sugar  4  times daily as directed. To accompany glucose monitor brands per insurance coverage.   blood glucose (NO BRAND SPECIFIED) test strip   No No   Sig: To use to test glucose level in the blood Use to test blood sugar  4 times daily as directed. To accompany glucose monitor brands per insurance coverage.   blood glucose monitoring (NO BRAND SPECIFIED) meter device kit   No No   Sig: Use as directed Per insurance coverage   carvedilol (COREG) 12.5 MG tablet   No Yes   Sig: Take 1 tablet (12.5 mg) by mouth 2 times daily (with meals)   furosemide (LASIX) 20 MG tablet   No Yes   Sig: Take 1 tablet (20 mg) by mouth daily   glucose (BD GLUCOSE) 4 g chewable tablet   No No   Sig: Take 4 tablets by mouth every 15 minutes as needed for low blood sugar   insulin glargine (LANTUS PEN) 100 UNIT/ML pen 2/9/2024 at 2000  Yes Yes   Sig: Inject 9 Units Subcutaneous every evening   insulin lispro (HUMALOG KWIKPEN) 100 UNIT/ML (1 unit dial) KWIKPEN 2/10/2024 at 1600  Yes Yes   Sig: Inject Subcutaneous 3 times daily (before meals) -299: 2 units, -349: 3 units, -399: 4 units,  or greater: 5 units   insulin pen needle (32G X 4 MM) 32G X 4 MM miscellaneous   No No   Sig: Use as directed by provider Per insurance coverage   irbesartan (AVAPRO) 300 MG tablet   No Yes   Sig: Take 1 tablet (300 mg) by mouth at bedtime   isosorbide mononitrate (IMDUR) 60 MG 24 hr tablet   No  "Yes   Sig: Take 1 tablet (60 mg) by mouth every evening   Patient taking differently: Take 60 mg by mouth daily   mirtazapine (REMERON) 15 MG tablet   No Yes   Sig: Take 1 tablet (15 mg) by mouth at bedtime   ramelteon (ROZEREM) 8 MG tablet   No Yes   Sig: Take 1 tablet (8 mg) by mouth at bedtime   Patient taking differently: Take 8 mg by mouth nightly as needed   tamsulosin (FLOMAX) 0.4 MG capsule   No Yes   Sig: Take 1 capsule (0.4 mg) by mouth every morning      Facility-Administered Medications: None     Allergies   Allergies   Allergen Reactions    No Known Drug Allergy        Physical Exam   /81   Pulse 76   Temp 97.8  F (36.6  C) (Oral)   Ht 1.753 m (5' 9\")   Wt 68 kg (150 lb)   SpO2 97%   BMI 22.15 kg/m      Constitutional: Awake, alert, cooperative, no apparent distress.    ENT: Normocephalic, without obvious abnormality, atraumatic, oral pharynx with dry mucus membranes.  Patient has false teeth in place.  Eyes extra occular movements intact.  Normal sclera.    Neck: Supple, symmetrical, trachea midline, no adenopathy.  Pulmonary: No increased work of breathing, fair air exchange, clear to auscultation bilaterally, no crackles or wheezing.  Cardiovascular: Irreg irreg with controlled rate, normal S1 and S2, no S3 or S4, and no murmur noted.  GI: Normal bowel sounds, soft, non-distended, non-tender.    Skin/Integumen: Visualized skin appeared clear.  Neuro: CN II-XII grossly intact.  Upper and lower extremities strength, coordination and sensation intact bilaterally.    Psych:  Alert and oriented x 3. Normal affect.  Extremities: 2+ pitting lower extremity edema of the lower extremity bilaterally and calves are non-tender to palpation bilaterally.      Medical Decision Making       Please see A&P for additional details of medical decision making.  Greater than 75 MINUTES SPENT BY ME on the date of service doing chart review, history, exam, documentation & further activities per the note.     "     Data   Data reviewed today: I reviewed all medications, new labs and imaging results over the last 24 hours. I personally reviewed no images or EKG's today.      I have personally reviewed the following data over the past 24 hrs:    7.3  \   9.8 (L)   / 283     138 97 (L) 33.4 (H) /  332 (H)   4.6 29 1.84 (H) \     ALT: 14 AST: 17 AP: 80 TBILI: 0.4   ALB: 4.2 TOT PROTEIN: 6.3 (L) LIPASE: N/A     TSH: N/A T4: N/A A1C: 7.7 (H)       Imaging results reviewed over the past 24 hrs:   No results found for this or any previous visit (from the past 24 hour(s)).

## 2024-02-11 NOTE — PROGRESS NOTES
ESPERANZA RN faxed discharge orders to:  - [facility] (via EPIC)  - [facility] [phone] (and called phone [facility] [phone])  - [facility] nursing  Phone [phone]  Fax:  [phone]

## 2024-02-11 NOTE — PROGRESS NOTES
02/11/24 1055   Appointment Info   Signing Clinician's Name / Credentials (OT) Sharmila Host, OTR/L   Living Environment   People in Home spouse   Current Living Arrangements assisted living   Home Accessibility no concerns   Living Environment Comments From [facility] w/ walk in shower, grab bars, shower chair.   Self-Care   Usual Activity Tolerance good   Current Activity Tolerance moderate   Equipment Currently Used at Home grab bar, tub/shower;grab bar, toilet;raised toilet seat;shower chair;walker, rolling   Fall history within last six months no   Activity/Exercise/Self-Care Comment Independent w/ ADLs uses FWW. Some discrepancies w/ pt's reports social hx & SW/CM note from [facility]. Per [facility], SBA for bathing, and does not get meals.   General Information   Onset of Illness/Injury or Date of Surgery 02/10/24   Referring Physician Chacho Salmon PA-C   Patient/Family Therapy Goal Statement (OT) Return to [facility]   Additional Occupational Profile Info/Pertinent History of Current Problem Past medical history significant for HTN, HLP, Chronic atrial fib, HFpEF, Pulmonary HTN, Recurrent left sided pleural effusion, CKD stage 3b, Chronic anemia, Rosacea, Vitamin D Def, Insomnia, Mood D/O, History of ICH (12/2023), History of CVA who was admitted to [facility] due to labile blood glucose in the setting of UTI with mild ABBIE.   Existing Precautions/Restrictions fall   Cognitive Status Examination   Orientation Status orientation to person, place and time   Follows Commands follows one-step commands   Safety Deficit insight into deficits/self-awareness   Memory Deficit short-term memory;moderate deficit   Pain Assessment   Patient Currently in Pain No   Range of Motion Comprehensive   General Range of Motion no range of motion deficits identified   Strength Comprehensive (MMT)   Comment, General Manual Muscle Testing (MMT) Assessment Generalized weakness   Transfers   Transfers sit-stand transfer   Sit-Stand Transfer    Sit-Stand [place] (Transfers) supervision   Assistive Device (Sit-Stand Transfers) walker, front-wheeled   Activities of Daily Living   BADL Assessment/Intervention lower body dressing;grooming   Lower Body Dressing Assessment/Training   [place] Level (Lower Body Dressing) supervision   Grooming Assessment/Training   [place] Level (Grooming) contact guard assist   Clinical Impression   Criteria for Skilled Therapeutic Interventions Met (OT) Yes, treatment indicated   OT Diagnosis decreased ADL independence   OT Problem List-Impairments impacting ADL problems related to;activity tolerance impaired;cognition;mobility   Assessment of Occupational Performance 1-3 Performance Deficits   Identified Performance Deficits dressing, grooming, cognition   Planned Therapy Interventions (OT) ADL retraining   Clinical Decision Making Complexity (OT) problem focused assessment/low complexity   Risk & Benefits of therapy have been explained patient   OT Total Evaluation Time   OT Eval, Low Complexity Minutes (27975) 10   OT Goals   Therapy Frequency (OT) One time eval and treatment  (pt d/cing on same day as eval)   OT Predicted Duration/Target Date for Goal Attainment 02/11/24   OT: Hygiene/Grooming modified independent;using adaptive equipment;while standing   OT: Lower Body Dressing Supervision/stand-by assist   OT: Transfer Modified independent;with assistive device   Self-Care/Home Management   Self-Care/Home Mgmt/ADL, Compensatory, Meal Prep Minutes (36367) 10   Symptoms Noted During/After Treatment (Meal Preparation/Planning Training) fatigue   Treatment Detail/Skilled Intervention Pt demo'ed safe hand placement and transfer technique w/ mod I and FWW after ed given by therapist for safe technique. Pt required VCs for safety and FWW advancement at sink w/ completing grooming tasks. Pt completed w/ mod I after cueing w/ FWW. Ed on d/c recommendations to promote independence and safety at [facility]. Pt receptive and  agreeable to plan.   OT Discharge Planning   OT Discharge Recommendation (DC Rec) home with assist;home with home care occupational therapy   OT Rationale for DC Rec Pt near baseline for ADL independence. Limited by decreased cognition, activity ludivina. Recommend HHOT for further cog assessment to determine further recommendations for safety at [facility]. Would not be safe at this time to return to independent living situation outside of [facility] d/t significant memory and safety deficits.   OT Brief overview of current status mod I/SBA w/ FWW   Total Session Time   Timed Code Treatment Minutes 10   Total Session Time (sum of timed and untimed services) 20

## 2024-02-11 NOTE — ED PROVIDER NOTES
History     Chief Complaint:  Blood Sugar Problem       HPI   Tc Garcia is a 83 year old male with type 2 diabetes on anticoagulants, eliquis, who presents due to labile blood sugar. About 2 night ago, 2/8/24, patient was noted to have a blood sugar about 356. Yesterday, 2/9/24, patient had a blood sugar in the mid-350s when going to sleep. In the middle of the night, patient developed severe sweating. When checked, assisted living found to be in a severe hypoglycemic event with blood sugar to be about 29. They gave patient 3 glasses of orange juice over 45 minutes. Nothing else was given. Patient had his regular doses of insulin before breakfast and lunch. Before dinner, assisted living found sugar level to be rapidly rising - at this time, it was 199. They decided to send patient into ED after seeing this. Patient had about 4 units of humalog before breakfast and lunch. He takes lancets at night and had 9 units last night. Tc states that he used to have 12 units of lancets every night but was recently moved down to 8. Patient endorses having scraped his left hand 3 days ago. He denies recent fever, coughing, nausea, emesis, or infection.     Independent Historian:   Supplementary information provided by assisted living home.     Review of External Notes:   I reviewed the chart and discharge summary from 12/29/2024 from [facility] Emergency Department. It stated: Tc Garcia is a 83 year old male admitted on 12/28/2023. He has a history of paroxysmal atrial fibrillation on Eliquis, pulmonary hypertension, recent hospitalization for intracranial hemorrhage from 12/11-12/27/2023 after a fall with head injury resulting in intraparenchymal hematoma.  His insulin pump was held given this intraparenchymal hematoma.  His insulin pump was restarted on 12/23/2023 but was found to be in DKA the following day.  His DKA did resolve by 12/25/2023.  He was transitioned to Lantus during his  "hospitalization, and then discharged to acute rehab unit just  where they reinitiated his insulin pump, he was transferred to the acute hospital setting however 12/28/2023 secondary to elevated blood glucoses.  It was subsequently discovered that the insulin pump needle was malpositioned.  Patient was seen by the diabetes service who recommended the resumption of subcutaneous insulin for the next few days, with eventual transition back to the pump after the patient has been discharged back to acute rehab.  Patient never developed anion gap but did have ketones in his blood.  Blood glucoses have primarily been in the 100s to 300s.  Diabetes team recommends the initiation of 12 units of Lantus daily, Humalog mealtime coverage 1 unit per 10 unit carbs with dinner and snacks and 1 unit for 7 unit carbs for breakfast and lunch in addition to sliding scale.    Allergies:   No known drug allergies    Medications:    Humalog  Amlodipine  Cacrvedilol  Durosemide  Isosorb mono tab  Flomax  Irbesartan  Lantus solos  Remeron   Ramelteon      Past Medical History:    Anemia  Back pain  CKD  CRF  Fall  Hyperlipidemia  Paroxysmal atrial fibrillation  Colonic polyps  Pleural effusion  Pulmonary hypertension  Recurrent left pleural effusion  Rosacea  Type 2 diabetes  Nephrotic range proteinuria  Vitamin D deficiency  Congestive heart failure    Past Surgical History:    Chest tube insertion  Tonsilectomy, adenoidectomy, bilateral myringotomy  Cardiac catheterization  Cardioversion  Cholecystectomy  Hernia repair  IR chest tube x4     Allergies:   No known allergies to drugs    Physical Exam   Patient Vitals for the past 24 hrs:   BP Temp Temp src Pulse SpO2 Height Weight   02/10/24 1930 136/77 -- -- 72 -- -- --   02/10/24 1900 138/77 -- -- 77 -- -- --   02/10/24 1840 -- -- -- -- 97 % -- --   02/10/24 1834 139/89 97.8  F (36.6  C) Oral 73 97 % 1.753 m (5' 9\") 68 kg (150 lb)        Physical Exam  Nursing note and vitals " reviewed.  Constitutional:  Appears well-developed and well-nourished.   HENT:   Head:    Atraumatic.   Mouth/Throat:   Oropharynx is clear and moist. No oropharyngeal exudate.   Eyes:    Pupils are equal, round, and reactive to light.   Neck:    Normal range of motion. Neck supple.      No tracheal deviation present. No thyromegaly present.   Cardiovascular:  Normal rate, regular rhythm, no murmur   Pulmonary/Chest: Breath sounds are clear and equal without wheezes or crackles.  Abdominal:   Soft. Bowel sounds are normal. Exhibits no distension and      no mass. There is no tenderness.      There is no rebound and no guarding.   Musculoskeletal:  Exhibits trace pretibial edema bilaterally.   Lymphadenopathy:  No cervical adenopathy.   Neurological:   Alert and oriented to person, place, and time.   Skin:    Skin is warm and dry. No rash noted. No pallor. Healing superficial   abrasion to dorsal left hand with minimal surrounding pink skin   discoloration with no warmth.       Emergency Department Course   Laboratory:  Labs Ordered and Resulted from Time of ED Arrival to Time of ED Departure   ROUTINE UA WITH MICROSCOPIC REFLEX TO CULTURE - Abnormal       Result Value    Color Urine Straw      Appearance Urine Cloudy (*)     Glucose Urine >=1000 (*)     Bilirubin Urine Negative      Ketones Urine Negative      Specific Gravity Urine 1.013      Blood Urine Trace (*)     pH Urine 6.5      Protein Albumin Urine 10 (*)     Urobilinogen Urine Normal      Nitrite Urine Negative      Leukocyte Esterase Urine Large (*)     Bacteria Urine Few (*)     WBC Clumps Urine Present (*)     RBC Urine 5 (*)     WBC Urine >182 (*)    COMPREHENSIVE METABOLIC PANEL - Abnormal    Sodium 138      Potassium 4.6      Carbon Dioxide (CO2) 29      Anion Gap 12      Urea Nitrogen 33.4 (*)     Creatinine 1.84 (*)     GFR Estimate 36 (*)     Calcium 9.3      Chloride 97 (*)     Glucose 593 (*)     Alkaline Phosphatase 80      AST 17      ALT 14       Protein Total 6.3 (*)     Albumin 4.2      Bilirubin Total 0.4     CBC WITH PLATELETS AND DIFFERENTIAL - Abnormal    WBC Count 7.3      RBC Count 3.49 (*)     Hemoglobin 9.8 (*)     Hematocrit 31.3 (*)     MCV 90      MCH 28.1      MCHC 31.3 (*)     RDW 18.0 (*)     Platelet Count 283      % Neutrophils 78      % Lymphocytes 11      % Monocytes 7      % Eosinophils 2      % Basophils 2      % Immature Granulocytes 0      NRBCs per 100 WBC 0      Absolute Neutrophils 5.7      Absolute Lymphocytes 0.8      Absolute Monocytes 0.5      Absolute Eosinophils 0.2      Absolute Basophils 0.1      Absolute Immature Granulocytes 0.0      Absolute NRBCs 0.0     HEMOGLOBIN A1C - Abnormal    Hemoglobin A1C 7.7 (*)    GLUCOSE BY METER - Abnormal    GLUCOSE BY METER POCT 408 (*)    GLUCOSE BY METER - Abnormal    GLUCOSE BY METER POCT 332 (*)    KETONE BETA-HYDROXYBUTYRATE QUANTITATIVE, RAPID - Normal    Ketone (Beta-Hydroxybutyrate) Quantitative 0.20     URINE CULTURE      Emergency Department Course & Assessments:       Interventions:  Medications   sodium chloride 0.9% BOLUS 1,000 mL (has no administration in time range)   insulin aspart (NovoLOG) injection (RAPID ACTING) (has no administration in time range)   dextrose 10% infusion (has no administration in time range)   insulin regular (MYXREDLIN) 1 unit/mL infusion (has no administration in time range)   glucose gel 15-30 g (has no administration in time range)     Or   dextrose 50 % injection 25-50 mL (has no administration in time range)     Or   glucagon injection 1 mg (has no administration in time range)   sodium chloride 0.9% BOLUS 1,000 mL (1,000 mLs Intravenous $New Bag 2/10/24 4691)        Independent Interpretation (X-rays, CTs, rhythm strip):  None    Assessments/Consultations/Discussion of Management or Tests:   ED Course as of 02/10/24 2028   Sat Feb 10, 2024   1650 I obtained history and examined the patient as noted above.     2022 I spoke with Evan Salmon,  KAMILLA of the Hospitalist team regarding patient.        Social Determinants of Health affecting care:   Healthcare Access/Compliance and Social Connections/Isolation    Disposition:  The patient was admitted to the hospital under the care of Dr. Hassan and Evan Salmon PA-C.     Impression & Plan    CMS Diagnoses: None    Medical Decision Making:  This patient has type 2 diabetes mellitus and has been having very labile blood sugars recently with a profoundly low symptomatic blood sugar of 29 last night and now an elevated blood sugar of almost 600, although he does not have signs of diabetic ketoacidosis at this time.  The patient was IV fluid hydrated with normal saline and began on an insulin drip.  He was admitted to the care of the hospitalist service for further evaluation treatment.  I did not find him to have any sign of infection or sepsis open at this time although he does have healing abrasions of his left hand and arm.    Diagnosis:    ICD-10-CM    1. Type 2 diabetes mellitus with hyperglycemia, with long-term current use of insulin (H)  E11.65     Z79.4       2. Labile blood glucose  R73.09       3. 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 Abrasion of left hand, initial encounter  S60.512A          Scribe Disclosure:  I, Alexandra Lisa, am serving as a scribe at 7:06 PM on 2/10/2024 to document services personally performed by Laurel Drummond MD, based on my observations and the provider's statements to me.     2/10/2024   Laurel Drummond MD [place], Cheri Lee, MD  02/11/24 0051

## 2024-02-11 NOTE — PLAN OF CARE
Physical Therapy Discharge Summary    Reason for therapy discharge:    Discharged to home with home therapy.    Progress towards therapy goal(s). See goals on Care Plan in [facility] electronic health record for goal details.  Goals partially met.  Barriers to achieving goals:   discharge from facility and discharge on same date as initial evaluation.    Therapy recommendation(s):    Continued therapy is recommended.  Rationale/Recommendations:  home PT to optimize functional independence and safety in home environment.

## 2024-02-25 PROBLEM — S22.019A CLOSED FRACTURE OF FIRST THORACIC VERTEBRA, UNSPECIFIED FRACTURE MORPHOLOGY, INITIAL ENCOUNTER (H): Status: ACTIVE | Noted: 2024-01-01

## 2024-02-25 PROBLEM — R73.9 HYPERGLYCEMIA: Status: ACTIVE | Noted: 2023-01-01

## 2024-02-25 PROBLEM — S12.501A CLOSED NONDISPLACED FRACTURE OF SIXTH CERVICAL VERTEBRA, UNSPECIFIED FRACTURE MORPHOLOGY, INITIAL ENCOUNTER (H): Status: ACTIVE | Noted: 2024-01-01

## 2024-02-25 NOTE — ED TRIAGE NOTES
BIBA from AL located in [place]; EMS reports pt fell at approx 0600 but refused intervention at the time. Staff denies pt hit head, pt states he did. C/O pain in the back and neck, worse with movement.      Triage Assessment (Adult)       Row Name 02/25/24 1629          Triage Assessment    Airway WDL WDL        Respiratory WDL    Respiratory WDL WDL        Skin Circulation/Temperature WDL    Skin Circulation/Temperature WDL X;all     Skin Temperature warm        Cardiac WDL    Cardiac WDL X;rhythm     Pulse Rate & Regularity apical pulse irregular     Cardiac Rhythm Atrial fibrillation        Cognitive/Neuro/Behavioral WDL    Cognitive/Neuro/Behavioral WDL WDL     Level of Consciousness alert     Arousal Level opens eyes spontaneously     Speech logical;spontaneous;clear     Mood/Behavior calm;cooperative        Pupils (CN II)    Pupil Size Left 3 mm     Pupil Size Right 3 mm        [place] Coma Scale    Best Eye Response 4-->(E4) spontaneous     Best Motor Response 6-->(M6) obeys commands     Best Verbal Response 5-->(V5) oriented     [place] Coma Scale Score 15

## 2024-02-25 NOTE — ED PROVIDER NOTES
History   Chief Complaint:  Fall     HPI   Tc Garcia is a 84 year old male with a history of CHF, type II diabetes, hypertension, and atrial fibrillation not on any anticoagulation who presents after a fall. Patient reports he slipped in the shower this morning, resulting in a fall. He did not lose consciousness during the fall, and was reluctant to come in to the ED initially, but later decided to present due to increasing soreness to his lower neck, which radiates into his ribs. Prior to the fall, he adds he felt at his baseline. Uses a walker, and has ambulated since the fall. No fevers, vomiting, or abdominal pain. No Tylenol taken today. Lives in [facility] with his wife who has advanced dementia.  No new numbness or weakness in arms or legs.    Independent Historian:   EMS, who reports that he initially fell around 0600 today.    Review of External Notes:   I reviewed the Neurology note from 2/2/24 regarding a follow-up for a previous intracranial hemorrhage. Patient was hospitalized at [facility] on 12/11, I reviewed discharge summary.    Medications:    Coreg  Lasix  Insulin  Avapro  Mirtazapine  Rozerem  Flomax     Past Medical History:    Type II diabetes  Essential hypertension  Plantar fascial fibromatosis  Hyperlipidemia  Recurrent Left Pleural effusion  ABBIE   Acute respiratory failure   Pulmonary hypertension   Anemia due to chronic kidney disease  Atrial fibrillation/flutter  Non-recurrent bilateral inguinal hernia  Cholecystitis  Alcohol dependence   CHF   Hypoglycemia  Hypoxia  Nephrotic range proteinuria  Secondary hyperparathyroidism  Stage 3b chronic kidney disease   Vitamin D deficiency  Diabetic ketoacidosis   Acute cystitis   Iron deficiency anemia  Intracranial hemorrhage   Hyperglycemia    Past Surgical History:   Cardioversion   Heart cath  Tonsillectomy   Adenoidectomy   Chest tube drain tunneled   Chest tube placed   Chest tube removed  Cholecystectomy   Herniorrhaphy inguinal        Physical Exam    Patient Vitals for the past 24 hrs:   BP Temp Temp src Pulse Resp SpO2   02/25/24 2139 124/76 98.1  F (36.7  C) Oral 73 16 96 %   02/25/24 1639 (!) 145/85 -- -- -- -- --   02/25/24 1638 -- 98.8  F (37.1  C) Oral -- -- --   02/25/24 1630 -- -- -- 81 (!) 41 95 %      Physical Exam  General: Chronically ill but nontoxic-appearing male semirecumbent in room 10  HENT: face nontender with full painless ROM mandible, no bony deformity, OP clear, no difficulty controlling secretions, skull nontender  Eyes: PERRL without proptosis  CV:  slightly regular rhythm, cap refill normal in all extremities, no murmur audible  Resp: normal effort, speaks in full phrases, no stridor  GI: abdomen soft, nontender, no guarding  MSK:  Cervical spine: mild diffuse tenderness to lower neck  Thoracic spine: no CVAT  Lumbar spine: no midline tenderness  Chest wall: nontender without crepitus  Pelvis stable  Extremities: no focal tenderness  Skin:   No abrasion  No ecchymosis  No laceration  Neuro: awake, alert, somewhat poor historian but  equal, can lift both legs off bed, responds appropriately to commands  Psych: cooperative    Emergency Department Course   ECG  ECG results from 02/25/24   EKG 12 lead     Value    Systolic Blood Pressure     Diastolic Blood Pressure     Ventricular Rate 80    Atrial Rate     MA Interval     QRS Duration 90        QTc 435    P Axis     R AXIS 32    T Axis -42    Interpretation ECG      Atrial fibrillation  Cannot rule out Anterior infarct , age undetermined  Confirmed by Dr. Royce Jennings MD       Imaging:  CT Chest w/o Contrast   Final Result   IMPRESSION:    1.  No convincing acute traumatic findings.    2.  Old, healed bilateral rib fractures. No definitive acute rib fractures.   3.  Small right and trace left pleural effusions with associated atelectasis.   4.  Similar subpleural groundglass nodular opacity in the right upper lobe. This is unchanged since at least 2020. Given the  relative stability over 4 years. This is favored to be benign. May consider annual surveillance if clinically indicated.   5.  No new or enlarging pulmonary nodules.   6.  Cardiomegaly.         CT Cervical Spine w/o Contrast   Final Result    IMPRESSION:   1.  C6 vertebral body superior endplate likely demonstrates a subtle acute/subacute fracture with mild compression deformity. Findings are new compared to prior exam.      2.  T1 vertebral body superior endplate likely demonstrates a subtle acute/subacute fracture with mild compression deformity. Findings are new compared to prior exam.      3.  No additional evidence for an acute fracture.      4.  Multiple chronic compression deformities described above.      5.  Degenerative changes described above.         CT Head w/o Contrast   Final Result   IMPRESSION:   1.  No acute intracranial process.      2.  Chronic intracranial changes described above.         Report per radiology    Laboratory:  Labs Ordered and Resulted from Time of ED Arrival to Time of ED Departure   BASIC METABOLIC PANEL - Abnormal       Result Value    Sodium 136      Potassium 4.1      Chloride 98      Carbon Dioxide (CO2) 24      Anion Gap 14      Urea Nitrogen 24.4 (*)     Creatinine 1.71 (*)     GFR Estimate 39 (*)     Calcium 9.3      Glucose 307 (*)    CBC WITH PLATELETS AND DIFFERENTIAL - Abnormal    WBC Count 14.6 (*)     RBC Count 3.75 (*)     Hemoglobin 10.6 (*)     Hematocrit 33.3 (*)     MCV 89      MCH 28.3      MCHC 31.8      RDW 16.4 (*)     Platelet Count 274      % Neutrophils 83      % Lymphocytes 6      % Monocytes 7      % Eosinophils 2      % Basophils 1      % Immature Granulocytes 1      NRBCs per 100 WBC 0      Absolute Neutrophils 12.2 (*)     Absolute Lymphocytes 0.9      Absolute Monocytes 1.0      Absolute Eosinophils 0.2      Absolute Basophils 0.1      Absolute Immature Granulocytes 0.1      Absolute NRBCs 0.0     GLUCOSE BY METER - Abnormal    GLUCOSE BY METER  POCT 302 (*)    GLUCOSE BY METER - Abnormal    GLUCOSE BY METER POCT 441 (*)    TROPONIN T, HIGH SENSITIVITY - Abnormal    Troponin T, High Sensitivity 49 (*)    GLUCOSE MONITOR NURSING POCT   ROUTINE UA WITH MICROSCOPIC REFLEX TO CULTURE   GLUCOSE MONITOR NURSING POCT   GLUCOSE MONITOR NURSING POCT   GLUCOSE MONITOR NURSING POCT   TROPONIN T, HIGH SENSITIVITY      Emergency Department Course & Assessments:    Interventions:  Medications   Lidocaine (LIDOCARE) 4 % Patch 1 patch (1 patch Transdermal $Patch/Med Applied 2/25/24 2817)   HYDROmorphone (PF) (DILAUDID) injection 0.5 mg (has no administration in time range)   amLODIPine (NORVASC) tablet 10 mg (has no administration in time range)   carvedilol (COREG) tablet 12.5 mg (has no administration in time range)   furosemide (LASIX) tablet 20 mg ( Oral Automatically Held 2/29/24 0800)   insulin glargine (LANTUS PEN) injection 12 Units (has no administration in time range)   insulin lispro (HumaLOG KWIKPEN) injection 5 Units ( Subcutaneous Canceled Entry 2/25/24 2109)   insulin lispro (HumaLOG KWIKPEN) injection 3 Units (has no administration in time range)   insulin lispro (HumaLOG KWIKPEN) injection 4 Units (has no administration in time range)   irbesartan (AVAPRO) tablet 300 mg (has no administration in time range)   isosorbide mononitrate (IMDUR) 24 hr tablet 60 mg (has no administration in time range)   mirtazapine (REMERON) tablet 15 mg (has no administration in time range)   tamsulosin (FLOMAX) capsule 0.4 mg (has no administration in time range)   glucose gel 15-30 g (has no administration in time range)     Or   dextrose 50 % injection 25-50 mL (has no administration in time range)     Or   glucagon injection 1 mg (has no administration in time range)   insulin aspart (NovoLOG) injection (RAPID ACTING) (4 Units Subcutaneous $Given 2/25/24 2053)   sodium chloride 0.9 % infusion ( Intravenous $New Bag 2/25/24 2115)   senna-docusate (SENOKOT-S/PERICOLACE)  8.6-50 MG per tablet 1 tablet (has no administration in time range)     Or   senna-docusate (SENOKOT-S/PERICOLACE) 8.6-50 MG per tablet 2 tablet (has no administration in time range)   ondansetron (ZOFRAN ODT) ODT tab 4 mg (has no administration in time range)     Or   ondansetron (ZOFRAN) injection 4 mg (has no administration in time range)   acetaminophen (TYLENOL) tablet 975 mg (has no administration in time range)   bisacodyl (DULCOLAX) suppository 10 mg (has no administration in time range)   polyethylene glycol (MIRALAX) Packet 17 g (has no administration in time range)   polyethylene glycol (MIRALAX) Packet 17 g (has no administration in time range)   prochlorperazine (COMPAZINE) injection 5 mg (has no administration in time range)     Or   prochlorperazine (COMPAZINE) tablet 5 mg (has no administration in time range)     Or   prochlorperazine (COMPAZINE) suppository 12.5 mg (has no administration in time range)   glucose gel 15-30 g (has no administration in time range)     Or   dextrose 50 % injection 25-50 mL (has no administration in time range)     Or   glucagon injection 1 mg (has no administration in time range)   acetaminophen (TYLENOL) tablet 1,000 mg (1,000 mg Oral $Given 2/25/24 1736)   insulin aspart (NovoLOG) injection (RAPID ACTING) (4 Units Subcutaneous $Given 2/25/24 2057)      Independent Interpretation (X-rays, CTs, rhythm strip):  I independently reviewed the patient's non-con CT from today's visit. I do not appreciate any intracranial hemorrhage.    Assessments/Consultations/Discussion of Management or Tests:  ED Course as of 02/25/24 2247   Sun Feb 25, 2024   1720 I evaluated the patient.    1943  Rechecked and updated.    2002 Consult with neurosurgery.    2017 Consult with Dr. Guerrero, hospitalist.      Social Determinants of Health affecting care:   Transportation, access to care    Disposition:  The patient was admitted to the hospital under the care of Dr. Guerrero.     Impression &  Plan    Medical Decision Making:  He describes slipping in the shower this morning, ultimately seeking care due to pain in the back of his head and his neck.  He was at risk for intracranial hemorrhage and skull fracture though these have been ruled out by CT, though unfortunately CTs of the cervical spine do show evidence of modest C6 and T1 fractures.  He has no corresponding neurologic deficits so emergent MRI and immediate surgical intervention are not indicated.  However, I did speak with the neurosurgery team, they recommended that he be placed in an [place] collar which I have requested.  He is known to have insulin-dependent diabetes, he is hyperglycemic but not in DKA.  He ultimately requested additional analgesia for his fractures and I felt this was reasonable, I have arranged for him to be admitted to the hospitalist service to optimize his analgesia and for formal surgical consultation.  I attempted to reach family by phone but was only able to leave a message.  I have arranged for him to be admitted to the hospitalist service.    Diagnosis:    ICD-10-CM    1. Closed nondisplaced fracture of sixth cervical vertebra, unspecified fracture morphology, initial encounter (H)  S12.501A       2. Closed fracture of first thoracic vertebra, unspecified fracture morphology, initial encounter (H)  S22.019A       3. Hyperglycemia  R73.9          Scribe Disclosure:  I, ADRIÁN CUELLO, am serving as a scribe at 4:51 PM on 2/25/2024 to document services personally performed by Royce Jennings MD based on my observations and the provider's statements to me.     2/25/2024   Royce Jennings MD [place], Jeffrey Alan, MD  02/26/24 0002

## 2024-02-26 NOTE — PROGRESS NOTES
"   02/26/24 1009   Appointment Info   Signing Clinician's Name / Credentials (PT) Morgan Taylor DPT   Living Environment   People in Home spouse   Current Living Arrangements assisted living   Home Accessibility no concerns   Living Environment Comments Pt lives in [facility], has been there for ~1 month. Pt states that he lives with spouse, that spouse is present with him at all times; however, also states she has dementia and uses a walker for ambulation. Pt has 3 meals/day and medication mgmt.   Self-Care   Usual Activity Tolerance moderate   Current Activity Tolerance moderate   Equipment Currently Used at Home grab bar, tub/shower;grab bar, toilet;raised toilet seat;shower chair;walker, rolling   Fall history within last six months yes   Number of times patient has fallen within last six months 1   Activity/Exercise/Self-Care Comment Pt is IND with functional mob, uses FWW for ambulation. Pt also owns manual W/C. Pt had fall in shower.   General Information   Onset of Illness/Injury or Date of Surgery 02/25/24   Referring Physician Gt Guerrero MD   Patient/Family Therapy Goals Statement (PT) to return home, with spouse.   Pertinent History of Current Problem (include personal factors and/or comorbidities that impact the POC) Pt is 83 yo male who, per chart, \"past medical history including brittle type 1 diabetes, on prandial and Lantus insulin, history hypoglycemia, hypertension, dyslipidemia, chronic atrial fibrillation, heart failure with preserved ejection fraction history, pulmonary hypertension, recurrent left pleural effusion, CKD stage III, chronic anemia, mood disorder, history of intracranial hemorrhage December 2023 recent hospital admission earlier this month [facility] for uncontrolled type 1 diabetes and Klebsiella UTI who presents with fall and vertebral fractures.  -Mechanical fall with associated mild compression deformities at C6 and T1 likely acute/subacute.  Placed in " "cervical hard collar to be worn at all times.  Also complains of shortness of breath for 1 to 2 weeks \"   Existing Precautions/Restrictions fall;spinal   General Observations spinal precautions, c-collar on at all times.   Cognition   Affect/Mental Status (Cognition) WFL   Orientation Status (Cognition) oriented x 3   Follows Commands (Cognition) WFL   Cognitive Status Comments pt initially stating he lived here on the 3rd floor (knew he was on 5th floor), didn't realize he was at [facility].   Pain Assessment   Patient Currently in Pain   (pain in lower cervical and upper thoracic spine, 8/10. Also reports pain in lumbar spine area.)   Integumentary/Edema   Integumentary/Edema Comments 1-2+ pitting edema in B feet, ankles and lower legs. Pt reports recently wearing compression garments for this. Not donned at this time.   Posture    Posture Forward head position;Protracted shoulders   Range of Motion (ROM)   Range of Motion ROM deficits secondary to pain   ROM Comment cervical ROM limited, all other ROM WFL   Strength (Manual Muscle Testing)   Strength (Manual Muscle Testing) Able to perform R SLR;Able to perform L SLR;Deficits observed during functional mobility   Bed Mobility   Bed Mobility supine-sit   Comment, (Bed Mobility) SBA.   Transfers   Transfers sit-stand transfer   Comment, (Transfers) FWW and Elbert, posterior lean with LEs pressed against bed for additional support during standing.   Gait/Stairs (Locomotion)   Comment, (Gait/Stairs) Pt ambulated 10' with FWW and CGA, step through pattern, increased B step amplitude with L foot higher than R (pt reports doing this intentionally), and mild unsteadiness with walker.   Balance   Balance Comments posterior lean with STS, LE weakness noted and mild unsteadiness with gait.   Sensory Examination   Sensory Perception Comments light touch intact per screen. of distal B LEs.   Clinical Impression   Criteria for Skilled Therapeutic Intervention Yes, " treatment indicated   PT Diagnosis (PT) impaired functional mobility   Influenced by the following impairments decreased strength, impaired balance, spinal precautions.   Functional limitations due to impairments difficulty with bed mobility, transfers, and ambulation.   Clinical Presentation (PT Evaluation Complexity) stable   Clinical Presentation Rationale clinical judgment   Clinical Decision Making (Complexity) low complexity   Planned Therapy Interventions (PT) balance training;bed mobility training;gait training;home exercise program;neuromuscular re-education;ROM (range of motion);strengthening;stretching;transfer training;progressive activity/exercise   Risk & Benefits of therapy have been explained evaluation/treatment results reviewed;care plan/treatment goals reviewed;risks/benefits reviewed;current/potential barriers reviewed;participants voiced agreement with care plan;participants included;patient   PT Total Evaluation Time   PT Eval, Low Complexity Minutes (57628) 10   Therapy Certification   Start of care date 02/25/24   Certification date from 02/26/24   Certification date to 03/04/24   Medical Diagnosis C6 and T1 compression deformities   Physical Therapy Goals   PT Frequency Daily   PT Predicted Duration/Target Date for Goal Attainment 03/04/24   PT Goals Bed Mobility;Transfers;Gait   PT: Bed Mobility Independent;Supine to/from sit;Rolling;Within precautions   PT: Transfers Modified independent;Sit to/from stand;Assistive device   PT: Gait Modified independent;Standard walker;150 feet   Therapeutic Activity   Therapeutic Activities: dynamic activities to improve functional performance Minutes (89407) 14   Symptoms Noted During/After Treatment Fatigue;Increased pain   Treatment Detail/Skilled Intervention Pt supine in bed upon PT arrival. Pt with c-collar already donned. Pt educated on spinal precautions and wearing of c-collar at all times. Pt verbalized understanding. Pt educated on log roll for  bed mob, did not demonstrate. Pt performed STS x6 during session, FWW and Elbert, progressed to close SBA with cues on UE placement, feet under knees, and anterior weight shift. Pt demonstrates improvement. Pt performed sit > supine, SBA. cues for log roll returning to supine to decrease strain on the cervical spine. Pt supine in bed, alarm on, all needs within reach. Pt insisting that he will need to return home because his wife will need him.   Gait Training   Treatment Detail/Skilled Intervention Pt ambulated 100' x2 with FWW and CGA, progressed to close SBA. Pt cued for walker proximity and to remain within walker confines during pivots and turns. Pt demonstrates. Pt has mild ataix gait with high amplitude steppage, mild instability, but no overt LOB during ambulation. Pt educated on continued use of walker for all ambulation for safety and balance.   Gait Training Minutes (59257) 10   Symptoms Noted During/After Treatment (Gait Training) fatigue   PT Discharge Planning   PT Plan review spinal precautions, repeat STS with focus on anterior weight shift, gait with FWW, balance.   PT Discharge Recommendation (DC Rec) home with assist;home with home care physical therapy;Transitional Care Facility   PT Rationale for DC Rec Pt at baseline reports being IND with functional mobility, uses FWW for ambulation (or handrail in hallway). Lives in senior care with spouse, reports spouse also uses walker and has dementia. Pt is currently below baseline, limited by impaired balance and strength, also pain in C-spine and L-spine. Pt with mild unsteadiness during transfers and gait on this date, likely high falls risk. If pt has Ax1 for ambulation, toileting, and showers, anticipate pt will be safe to return home with continued use of walker. Pt may benefit from TCF stay given impaired balance and falls risk. Will reassess tomorrow, 2/26.   PT Brief overview of current status STS with FWW CG-Elbert, gait with FWW SB-CGA.   Total Session  Time   Timed Code Treatment Minutes 24   Total Session Time (sum of timed and untimed services) 34     [facility]  OUTPATIENT PHYSICAL THERAPY EVALUATION  PLAN OF TREATMENT FOR OUTPATIENT REHABILITATION  (COMPLETE FOR INITIAL CLAIMS ONLY)  Patient's Last Name, First Name, M.I.  YOB: 1940  Tc Garcia                        Provider's Name  [facility] Medical Record No.  [id number]                             Onset Date:  02/25/24   Start of Care Date:  02/25/24   Type:     _X_PT   ___OT   ___SLP Medical Diagnosis:  C6 and T1 compression deformities              PT Diagnosis:  impaired functional mobility Visits from SOC:  1     See note for plan of treatment, functional goals and certification details    I CERTIFY THE NEED FOR THESE SERVICES FURNISHED UNDER        THIS PLAN OF TREATMENT AND WHILE UNDER MY CARE     (Physician co-signature of this document indicates review and certification of the therapy plan).               I will STOP taking the medications listed below when I get home from the hospital:  None

## 2024-02-26 NOTE — H&P
[facility]    History and Physical  Hospitalist       Date of Admission:  2/25/2024    Assessment & Plan   Tc Garcia is a 84 year old male complex past medical history including brittle type 1 diabetes, on prandial and Lantus insulin, history hypoglycemia, hypertension, dyslipidemia, chronic atrial fibrillation, heart failure with preserved ejection fraction history, pulmonary hypertension, recurrent left pleural effusion, CKD stage III, chronic anemia, mood disorder, history of intracranial hemorrhage December 2023 recent hospital admission earlier this month [facility] for uncontrolled type 1 diabetes and Klebsiella UTI who presents with fall and vertebral fractures.  -Mechanical fall with associated mild compression deformities at C6 and T1 likely acute/subacute.  Placed in cervical hard collar to be worn at all times.  Also complains of shortness of breath for 1 to 2 weeks.      Mild compression deformities at C6 and T1,   -Appear to be associated with mechanical fall day of admission.  -No neurologic complaints.  Nonfocal neuroexam  -Seen by neurosurgery on admission.  -Placed in c-collar to be worn at all times  Plan-discharge observation, fall precautions, c-collar to be worn at all times.,  Cervical x-ray AP lateral x-rays ordered for the morning while in c-collar.  Patient will need neurosurgery follow-up in 6 weeks with repeat x-rays.  Neurosurgery consult.    Mechanical fall  -Associated mild compression deformities at C6 T1.  No clear other injury per exam and history.  Plan-telemetry, fall precautions, physical therapy consult, will need TCU placement likely.  Social work consult.    Type 1 diabetes brittle  -Recently hospitalized [facility].  See discharge summary.  Admitted to [facility] [place] for continuous insulin infusion with normal numerous prior hospitalizations.  Recent hospitalization at [facility] following intracranial hemorrhage.  Required  endocrinology involvement for management of his type 1 diabetes.  Patient deemed not appropriate for insulin pump due to cognitive concerns.  See [facility] discharge summary 12/29/2023  -Med rec completed by Pharm.D.  Patient insulin administered by assisted living staff.  -Is unclear if he is on bedtime sliding scale.  Patient seems to think he does receive short acting insulin at bedtime.  -Patient n.p.o. most of the day.  Patient eating dinner at time of my exam.  -Blood sugar in the 300 range.  BMP normal.  No anion gap or acidosis  -Patient completed dinner as I was seeing the patient.  We gave him sliding scale coverage and mealtime coverage at that time.  Blood sugar was checked immediately after his meal which was in the 400 range as expected as he has just eaten.  -Patient again on the floor and again eating another meal.  Will provide insulin sliding scale coverage combined with mealtime coverage x 1    Plan-continue Lantus 12 units at bedtime, continue lispro/aspart 3 units with breakfast, 4 units with lunch and 5 units with dinner.  This varies somewhat by approximately 1 unit at home.  -Continue custom insulin sliding scale for meals  -I wrote for custom bedtime insulin sliding scale less aggressive than his daytime sliding scale, maintenance fluids overnight.    Hypertension-  -continue prior to admission amlodipine 10 mg daily, Coreg 12.5 mg twice daily, irbesartan 300 mg nightly, Imdur 60mg daily  A.m. BMP,   -will hold Lasix and hold subsequent irbesartan given slight rise in creatinine reassess in the morning    Shortness of breath  -On review of systems patient did endorse shortness of breath seems mild onset approximately 1 to 2 weeks ago.  Patient is a vague historian.  He feels slightly short of breath at this time.  No orthopnea or PND.  Chronic lower extremity edema stable.  No chest pain.  No cough fevers or chills.  -Patient appears euvolemic possibly mildly volume down.  Unable to assess JVP  given c-collar  -CT chest shows small right pleural effusion and trace left pleural effusion.  Mild emphysema.  No infiltrates or CHF.  -EKG without ischemic changes.  -No chest pain.  Shortness of breath mild at this time.  Not hypoxic.  -Serial troponins in the emergency room showed are in the 40 range flat trend not consistent with ACS.  Consider mild CHF though does not seem volume overloaded  Plan-telemetry, follow troponin trend, no indication for heparin at this time is very low suspicion for ACS.  Follow oxygen saturations.  Will be providing gentle hydration overnight given hyperglycemia and possible mild dehydration by BMP.  Follow for signs of CHF,    History of CHF preserved ejection fraction  -Patient started on Lasix 20 mg daily on January 29 in cardiology clinic.  He had some peripheral edema but clear lungs and no complaints of dyspnea.  Weight at that time was 66.7 kg.  -Patient does have some complaints of shortness of breath.  Does not appear to be grossly volume up and may be actually intravascularly down with creatinine slightly elevated and hyperglycemic.  Lungs are clear.  -Echo 12/2023:  Showed normal LV size and function EF 60 to 65%.  RV lead dilated with normal RV function.  Severe biatrial enlargement.  RV systolic pressure 54+ RA pressure.  1-2+ TR    Plan-I's and O's, holding Lasix tonight gentle hydration for a total of 500 cc and reassess in the morning.  A.m. BMP.  Follow-up volume status in the morning.    Paroxysmal atrial fibrillation  -History of traumatic intracranial bleed while on anticoagulation.  Anticoagulation held 2023.  See  [facility] discharge summary December 2023  -Seen in follow-up with cardiology January of this year with referral for watchman's device and coordination of care with neurology.  -Anticoagulation currently on hold.  [id number] score of 5  -Patient is currently rate controlled.  He is not on a beta-blocker  -Telemetry overnight.  Follow-up with  cardiology as already arranged regarding watchman's device.  For now continue to hold anticoagulation.      BPH-continue Flomax 0.4 mg daily    Mood disorder-continue Remeron 15 mg nightly.  Will hold ramelteon given risk for falls at this time.    Recent UTI earlier this month was hospitalization.  Completed 7 days of Keflex for Klebsiella.  Patient without UTI symptoms or infectious symptoms at this time.    History of intraparenchymal hematoma 12/11/2023 following fall with head injury on anticoagulation.  -Anticoagulation held plan was for patient to follow-up with cardiology to discuss Watchman's device.    -Patient seen in follow-up with cardiology January 29.  Cardiology and neurology and discussion regarding watchman implantation.  [id number] or 5.  He would require short-term anticoagulation with long-term antiplatelet therapy/aspirin.  Referral sent to Dr. Dubois for watchman consideration.      DVT Prophylaxis: Pneumoboots  Code Status: DNR / DNI    Disposition: Expected discharge in 1 to 2 days once appropriate disposition has been determined.    Therapy consult, social work consult care coordinator consult.    Follow-up with neurosurgery.  Follow-up with PCP  Follow-up with endocrinology    Gt Guerrero MD, MD    Primary Care Physician   Physician No Ref-Primary    Chief Complaint   Fall, back pain  sob    History is obtained from the patient    History of Present Illness   Tc Garcia is a 84 year old male complex past medical history including brittle type 1 diabetes, on prandial and Lantus insulin, history hypoglycemia, hypertension, dyslipidemia, chronic atrial fibrillation, heart failure with preserved ejection fraction history, pulmonary hypertension, recurrent left pleural effusion, CKD stage III, chronic anemia, mood disorder, history of intracranial hemorrhage December 2023 recent hospital admission earlier this month [facility] for uncontrolled type 1 diabetes and  Klebsiella UTI who presents with fall and vertebral fractures.    Since his last discharge patient states that he has been doing fine.  He resides in an assisted living facility with his wife.  His medications including his insulin is administered by staff.  He notes blood sugars typically are in the high 100s or low 200 range.  He has occasional blood sugars in the 300s or higher range and occasional low blood sugars in the 50 range.  Took his Lantus last evening.    Has been not feeling well lately.  Earlier today he said while getting out of the shower he slipped and fell in the shower hit his head and also had associated back pain.  911 was called as patient and his wife are yelling out for help.  Patient denies any loss of consciousness.  He denies any cardiopulmonary or other symptoms prior to his fall.  He states he was feeling fine and simply slipped in the shower.  Patient was brought to [facility] ER for further evaluation.    Emergency room patient is afebrile.  Pulse in the 70s to 80s.  Normotensive.  Normal oxygen saturations.    Sodium 136 blood sugar 355.  Potassium 4.1.  BUN 24 creatinine 1.7.  Initial troponin 49 subsequent troponin 45.  White blood count 14.6.  Hemoglobin 10.6.  Platelet count 274.  Mild left shift.  EKG shows atrial fibrillation without ischemic changes.  Heart rate rate controlled.    Imaging-  CT chest without contrast shows  IMPRESSION:   1.  No convincing acute traumatic findings.   2.  Old, healed bilateral rib fractures. No definitive acute rib fractures.   3.  Small right and trace left pleural effusions with associated atelectasis.   4.  Similar subpleural groundglass nodular opacity in the right upper lobe. This is unchanged since at least 2020. Given the relative stability over 4 years. This is favored to be benign. May consider annual surveillance if clinically indicated.   5.  No new or enlarging pulmonary nodules.   6.  Cardiomegaly.     CT head without  contrast shows no acute intracranial process    CT cervical spine without contrast shows-  1.  C6 vertebral body superior endplate likely demonstrates a subtle acute/subacute fracture with mild compression deformity. Findings are new compared to prior exam.   2.  T1 vertebral body superior endplate likely demonstrates a subtle acute/subacute fracture with mild compression deformity. Findings are new compared to prior exam.   3.  No additional evidence for an acute fracture.   4.  Multiple chronic compression deformities described above.   5.  Degenerative changes described above.     Patient is given Tylenol emergency room.    Seen by neurosurgery.  Imaging reviewed.  They recommended cervical hard collar to be worn at all times.  Cervical AP lateral x-rays in hard collar.  Patient will need follow-up with neurosurgery in 6 weeks.    Past Medical History    I have reviewed this patient's medical history and updated it with pertinent information if needed.   Past Medical History:   Diagnosis Date    Anemia     Anemia of chronic disease 5/14/2020    Back pain     CKD (chronic kidney disease) stage 3, GFR 30-59 ml/min (H)     CRF (chronic renal failure), stage 3  5/14/2020    Fall 11/2019    suffered multiple left rib fractures, C3 and T2 fractures    Mixed hyperlipidemia 7/7/2004    Paroxysmal atrial fibrillation (H)     Personal history of colonic polyps 1972    gets colonoscopy every five years, due in 2006    Pleural effusion on left 11/2019    after multiple rib fractures    Pulmonary hypertension (H) 5/10/2020    Added automatically from request for surgery [id number]    Recurrent Left Pleural effusion -- S/P Pleurex Cath 5/12/20 12/30/2019    Rosacea 1989    Type II or unspecified type diabetes mellitus without mention of complication, not stated as uncontrolled 1999    Unspecified essential hypertension 1994       Past Surgical History   I have reviewed this patient's surgical history and updated it with pertinent  information if needed.  Past Surgical History:   Procedure Laterality Date    ANESTHESIA CARDIOVERSION N/A 2/3/2020    Procedure: ANESTHESIA, FOR CARDIOVERSION;  Surgeon: GENERIC ANESTHESIA PROVIDER;  Location:  OR     RIGHT HEART CATH MEASUREMENTS RECORDED N/A 5/13/2020    Procedure: Right Heart Cath;  Surgeon: Senthil Silva MD;  Location:  HEART CARDIAC CATH LAB    HC REMOVE TONSILS/ADENOIDS,<11 Y/O      T & A <11y.o.    IR CHEST TUBE DRAIN TUNNELED LEFT  5/12/2020    IR CHEST TUBE PLACEMENT NON-TUNNELLED LEFT  7/11/2020    IR CHEST TUBE REMOVAL NON TUNNELED LEFT  7/17/2020    IR CHEST TUBE REMOVAL TUNNELED LEFT  7/11/2020    LAPAROSCOPIC CHOLECYSTECTOMY N/A 11/22/2020    Procedure: CHOLECYSTECTOMY, LAPAROSCOPIC;  Surgeon: Annette Lambert MD;  Location:  OR    LAPAROSCOPIC HERNIORRHAPHY INGUINAL BILATERAL Bilateral 10/27/2020    Procedure: LAPAROSCOPIC BILATERAL INGUINAL HERNIA REPAIR WITH MESH;  Surgeon: Brian Garsia MD;  Location:  OR       Prior to Admission Medications   Prior to Admission Medications   Prescriptions Last Dose Informant Patient Reported? Taking?   Alcohol Swabs PADS  Nursing Home No No   Sig: Use to swab the area of the injection or bina as directed Per insurance coverage   Continuous Blood Gluc  (DEXCOM G6 ) JOSE  Nursing Home Yes No   Sig: Dexcom G6    USE EACH WITH 10 DAYS SENSORS   Lidocaine (LIDOCARE) 4 % Patch  Nursing Home Yes Yes   Sig: Place 1 patch onto the skin daily as needed for moderate pain To prevent lidocaine toxicity, patient should be patch free for 12 hrs daily.   amLODIPine (NORVASC) 10 MG tablet 2/25/2024 at [facility] No Yes   Sig: Take 1 tablet (10 mg) by mouth daily   blood glucose (NO BRAND SPECIFIED) lancets standard  Nursing Home No No   Sig: To use to test glucose level in the blood Use to test blood sugar  4  times daily as directed. To accompany glucose monitor brands per insurance coverage.   blood  glucose (NO BRAND SPECIFIED) test strip  Nursing [place] No No   Sig: To use to test glucose level in the blood Use to test blood sugar  4 times daily as directed. To accompany glucose monitor brands per insurance coverage.   blood glucose monitoring (NO BRAND SPECIFIED) meter device kit  Nursing [place] No No   Sig: Use as directed Per insurance coverage   carvedilol (COREG) 12.5 MG tablet 2/25/2024 at [facility] No Yes   Sig: Take 1 tablet (12.5 mg) by mouth 2 times daily (with meals)   furosemide (LASIX) 20 MG tablet 2/25/2024 at 1253 [facility] No Yes   Sig: Take 1 tablet (20 mg) by mouth daily   glucose (BD GLUCOSE) 4 g chewable tablet  Nursing [place] No No   Sig: Take 4 tablets by mouth every 15 minutes as needed for low blood sugar   insulin glargine (LANTUS PEN) 100 UNIT/ML pen 2/24/2024 at 1907 [facility] Yes Yes   Sig: Inject 12 Units Subcutaneous every evening   insulin lispro (HUMALOG KWIKPEN) 100 UNIT/ML (1 unit dial) KWIKPEN 2/25/2024 at 0703 [facility] Yes Yes   Sig: Inject Subcutaneous 3 times daily (before meals) -200 = 1 unit, 201-250 = 2 units, 251-300 = 3 units, 301-350 = 4 units, 351-400 = 5 units, 401 or greater = 6 units and call provider   insulin lispro (HUMALOG KWIKPEN) 100 UNIT/ML (1 unit dial) [facility] Yes Yes   Sig: Inject 5 Units Subcutaneous daily (with dinner)   insulin lispro (HUMALOG KWIKPEN) 100 UNIT/ML (1 unit dial) KWIKPEN 2/25/2024 at 0703 [facility] Yes Yes   Sig: Inject 3 Units Subcutaneous daily (with breakfast)   insulin lispro (HUMALOG KWIKPEN) 100 UNIT/ML (1 unit dial) [facility] Yes Yes   Sig: Inject 4 Units Subcutaneous daily (with lunch)   insulin pen needle (32G X 4 MM) 32G X 4 MM miscellaneous  [facility] Home No No   Sig: Use as directed by provider Per insurance coverage   irbesartan (AVAPRO) 300 MG tablet 2/24/2024 at 1915 [facility] No Yes   Sig: Take 1 tablet (300 mg) by mouth at bedtime   isosorbide mononitrate (IMDUR) 60 MG  24 hr tablet 2024 at 1253 [facility] No Yes   Sig: Take 1 tablet (60 mg) by mouth every evening   Patient taking differently: Take 60 mg by mouth daily   mirtazapine (REMERON) 15 MG tablet 2024 at 1915 [facility] No Yes   Sig: Take 1 tablet (15 mg) by mouth at bedtime   ramelteon (ROZEREM) 8 MG tablet 2024 at 2045 Nursing Home Yes Yes   Sig: Take 1 tablet (8 mg) by mouth nightly as needed for sleep   tamsulosin (FLOMAX) 0.4 MG capsule 2024 at 1253 [facility] No Yes   Sig: Take 1 capsule (0.4 mg) by mouth every morning      Facility-Administered Medications: None     Allergies   Allergies   Allergen Reactions    No Known Drug Allergy        Social History   I have reviewed this patient's social history and updated it with pertinent information if needed. Tc Garcia  reports that he quit smoking about 16 years ago. His smoking use included cigarettes. He started smoking about 56 years ago. He has a 8 pack-year smoking history. He has never used smokeless tobacco. He reports that he does not currently use alcohol after a past usage of about 35.0 standard drinks of alcohol per week. He reports that he does not currently use drugs.    Family History   I have reviewed this patient's family history and updated it with pertinent information if needed.   Family History   Problem Relation Age of Onset    Family History Negative Mother          age 71    Family History Negative Father          age 70    Diabetes Brother         alive age 77    Diabetes Brother         alive age 76    Family History Negative Brother             Family History Negative Brother             Diabetes Sister         alive age74    Family History Negative Sister          age 86    Heart Disease Sister             Family History Negative Sister             Family History Negative Sister             Diabetes Sister     Diabetes Sister     Diabetes Brother      Diabetes Brother        Review of Systems   The 10 point Review of Systems is negative other than noted in the HPI or here.     Physical Exam   Temp: 98.8  F (37.1  C) Temp src: Oral BP: (!) 145/85 Pulse: 81   Resp: (!) 41 SpO2: 95 %      Vital Signs with Ranges  Temp:  [98.8  F (37.1  C)] 98.8  F (37.1  C)  Pulse:  [81] 81  Resp:  [41] 41  BP: (145)/(85) 145/85  SpO2:  [95 %] 95 %  0 lbs 0 oz    Constitutional: Sitting up in bed, no acute distress, nontoxic-appearing  Eyes: Pupils equal round reactive to light and accommodating extraocular eye motions intact  HEENT: Normocephalic atraumatic.  Normal oral pharynx.  Moist mucous membranes  Neck-c-collar in place.  Unable to assess JVP  Respiratory: Clear to auscultation bilaterally, breathing easily  Cardiovascular: Regular rate and rhythm no rubs gallops or murmurs appreciated  GI: Soft nontender nondistended no hepatosplenomegaly appreciated  Lymph/Hematologic: No axillary lymphadenopathy  Skin: No rash, he has a slight bilateral lower extremity edema approximately 1+  Musculoskeletal: C-collar in place.  No focal joint swelling erythema.  No pain with passive range of motion of major joints in the upper and lower extremities bilaterally.  Neurologic: Toes downgoing bilaterally.  No clonus in the upper or lower extremities.  Reflexes 2+ symmetric throughout patellar and brachial.  Strength 5 out of 5 in extremities x 4.  Toes downgoing bilaterally, cranial nerves II through XII grossly intact.  Normal speech mentation alert.  He is fully oriented.  Psychiatric: Normal affect    Data   Data reviewed today:  I personally reviewed oratory studies and imaging studies performed the emergency room.  EKG shows atrial fibrillation without ischemic changes.  Reviewed by myself  Recent Labs   Lab 02/25/24 2054 02/25/24  1729 02/25/24  1625   WBC  --   --  14.6*   HGB  --   --  10.6*   MCV  --   --  89   PLT  --   --  274   NA  --   --  136   POTASSIUM  --   --  4.1    CHLORIDE  --   --  98   CO2  --   --  24   BUN  --   --  24.4*   CR  --   --  1.71*   ANIONGAP  --   --  14   LLOYD  --   --  9.3   * 302* 307*       Imaging:  Recent Results (from the past 24 hour(s))   CT Head w/o Contrast    Narrative    EXAM: CT HEAD W/O CONTRAST  LOCATION: [facility]  DATE: 2/25/2024    INDICATION: pain after fall blunt trauma today  COMPARISON: None.  TECHNIQUE: Routine CT Head without IV contrast. Multiplanar reformats. Dose reduction techniques were used.    FINDINGS:  INTRACRANIAL CONTENTS: No intracranial hemorrhage, extraaxial collection, or mass effect.  No CT evidence of acute infarct.     Mild to moderate presumed chronic small vessel ischemic changes.     Moderate to severe ventriculomegaly with ventricles enlarged disproportionate to the sulcal atrophy. Findings may reflect normal pressure hydrocephalus, central brain atrophy, or extraventricular noncommunicating hydrocephalus. Please correlate   clinically.      VISUALIZED ORBITS/SINUSES/MASTOIDS: No intraorbital abnormality. Mild mucosal thickening scattered about the paranasal sinuses. No middle ear or mastoid effusion.    BONES/SOFT TISSUES: No acute abnormality.      Impression    IMPRESSION:  1.  No acute intracranial process.    2.  Chronic intracranial changes described above.   CT Cervical Spine w/o Contrast    Narrative    EXAM: CT CERVICAL SPINE W/O CONTRAST  LOCATION: [facility]  DATE: 2/25/2024    INDICATION: pain after fall blunt trauma today  COMPARISON: CT cervical spine 12/11/2023, MRI thoracic spine 04/05/2023  TECHNIQUE: Routine CT Cervical Spine without IV contrast. Multiplanar reformats. Dose reduction techniques were used.    FINDINGS:  VERTEBRA:  C4 vertebral body inferior endplate mild central chronic compression deformity similar compared to CT cervical spine 12/11/2023.    C6 vertebral body superior endplate likely demonstrates a subtle  acute/subacute fracture with mild compression deformity. Findings are new compared to prior exam.    C7 vertebral body mild to moderate chronic compression deformity similar compared to prior exams.    T1 vertebral body superior endplate likely demonstrates a subtle acute/subacute fracture with mild compression deformity. Findings are new compared to prior exam.    T2 vertebral body mild to moderate chronic compression deformity similar compared to prior exams.    No additional acute fracture.  No acute post traumatic subluxations.   Remaining vertebral body heights within normal limits. No prevertebral soft tissue swelling.    CANAL/FORAMINA: Multilevel spondylosis. Various levels and degrees of central canal stenosis.  No critical central canal stenosis.  Various levels and degrees of neural foraminal stenosis.  No significant subluxations.    PARASPINAL: No significant extraspinal abnormality.      Impression     IMPRESSION:  1.  C6 vertebral body superior endplate likely demonstrates a subtle acute/subacute fracture with mild compression deformity. Findings are new compared to prior exam.    2.  T1 vertebral body superior endplate likely demonstrates a subtle acute/subacute fracture with mild compression deformity. Findings are new compared to prior exam.    3.  No additional evidence for an acute fracture.    4.  Multiple chronic compression deformities described above.    5.  Degenerative changes described above.     CT Chest w/o Contrast    Narrative    EXAM: CT CHEST W/O CONTRAST  LOCATION: [facility]  DATE: 2/25/2024    INDICATION: pain after fall blunt trauma today  COMPARISON: 08/05/2020.  TECHNIQUE: CT chest without IV contrast. Multiplanar reformats were obtained. Dose reduction techniques were used.  CONTRAST: None.    FINDINGS:   LUNGS AND PLEURA: Small right and trace left pleural effusions with associated atelectasis. There is a persistent subpleural groundglass nodular  opacity in the right upper lobe on series 4 image 57 measuring 2.4 x 1.2 cm, relatively similar to prior   exam. Mild emphysema. No pneumothorax. Scattered areas of atelectasis and scarring. Scattered pleural calcifications bilaterally may be from remote asbestos exposure.    MEDIASTINUM/AXILLAE: Cardiomegaly. The ascending aorta measures 4.1 cm. Similar borderline enlarged mediastinal lymph nodes  Benign calcified mediastinal lymph nodes. No retrosternal hematoma.    CORONARY ARTERY CALCIFICATION: Severe.    UPPER ABDOMEN: Extensive atherosclerosis. Cholecystectomy.    MUSCULOSKELETAL: Degenerative changes of the spine. L1 vertebroplasty changes. Old, healed bilateral rib fractures. Old, healed sternal body fracture with residual posttraumatic deformity. Similar lucency in the right seventh lateral rib with sclerotic   rim. This is been stable since at least 2020 and benign.      Impression    IMPRESSION:   1.  No convincing acute traumatic findings.   2.  Old, healed bilateral rib fractures. No definitive acute rib fractures.  3.  Small right and trace left pleural effusions with associated atelectasis.  4.  Similar subpleural groundglass nodular opacity in the right upper lobe. This is unchanged since at least 2020. Given the relative stability over 4 years. This is favored to be benign. May consider annual surveillance if clinically indicated.  5.  No new or enlarging pulmonary nodules.  6.  Cardiomegaly.

## 2024-02-26 NOTE — PROGRESS NOTES
Neurosurgery progress note    I spoke with Dr. Jennings regarding this patient.    84-year-old male presenting after a ground level fall.  Reports mild back soreness. Intact on exam per ER. Is TTP over cervical/thoracic spine.     Imaging evidence of mild compression deformities at C6 and T1, likely acute/subacute.    Neurosurgery recommendations:  -Cervical hard collar to be worn at all times.   -Cervical AP/LAT xrays  -Follow up with neurosurgery in 6 weeks with repeat xrays     Discussed with Dr. Krys Andrews PA-C  [facility]  [address] [address]  [phone]

## 2024-02-26 NOTE — PLAN OF CARE
Observation Goals:    -diagnostic tests and consults completed and resulted: Not met  -vital signs normal or at patient baseline: Met  -tolerating oral intake to maintain hydration: Met  -adequate pain control on oral analgesics: Met  -dyspnea improved and O2 sats greater than 88% on room air or prior home oxygen levels: Met  -safe disposition plan has been identified: Not met  Nurse to notify provider when observation goals have been met and patient is ready for discharge.

## 2024-02-26 NOTE — PLAN OF CARE
PRIMARY Concern: Fell in the shower AM of 2/25. Initially refused to come to hospital, but changed mind once back/neck pain began worsening. CT shows mild compression deformities at C6 and T1, likely acute/subacute.  SAFETY RISK Concerns (fall risk, behaviors, etc.): Fall risk      Isolation/Type: n/a  Tests/Procedures for NEXT shift: Xray   Consults? (Pending/following, signed-off?) SW & PT pending  Where is patient from? 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 Brittle. Monitor BG closely  Anticipated DC date & active delays: TBD  _____________________________________________________________________________  SUMMARY NOTE:  Orientation/Cognitive: A&Ox4 Forgetful.  Observation Goals (Met/ Not Met): Not met  Mobility Level/Assist Equipment: A1 GB/W  Antibiotics & Plan (IV/po, length of tx left):   Pain Management: Scheduled Tylenol.  Tele/VS/O2: VSS on RA  ABNL Lab/BG: Trops: 49>45>46. BG overnight: 355>357>229   Diet: Mod Carb  Bowel/Bladder: Continent. Using Urinal at bed side  Skin Concerns: Scattered bruising. Scattered small abrasions. Scattered scabs. All primarily on bilat arms. Old steri strips on R elbow. Redness around penis and scrotum.   Drains/Devices: PIV L AC Infusing.  Patient Stated Goal for Today: To rest.

## 2024-02-26 NOTE — PHARMACY-ADMISSION MEDICATION HISTORY
Pharmacist Admission Medication History    Admission medication history is complete. The information provided in this note is only as accurate as the sources available at the time of the update.    Information Source(s): Facility (U/NH/) medication list/MAR and CareEverywhere/SureScripts via N/A    Pertinent Information: Med hx completed using med list with last dose times from [facility] ph: [phone]. Print out listed humalog 3 units and 4 units before breakfast. Clarified with Naz from facility that it is 3 units with breakfast and 4 units with lunch. The humalog orders were just changed 2/24 according to med list.     Changes made to PTA medication list:  Added: None  Deleted: None  Changed: Lantus, humalog    Allergies reviewed with patient and updates made in EHR: yes - NKDA per facility records    Medication History Completed By: Tanja Cantor RP 2/25/2024 8:05 PM    PTA Med List   Medication Sig Last Dose    amLODIPine (NORVASC) 10 MG tablet Take 1 tablet (10 mg) by mouth daily 2/25/2024 at 1253    carvedilol (COREG) 12.5 MG tablet Take 1 tablet (12.5 mg) by mouth 2 times daily (with meals) 2/25/2024 at 1253    furosemide (LASIX) 20 MG tablet Take 1 tablet (20 mg) by mouth daily 2/25/2024 at 1253    insulin glargine (LANTUS PEN) 100 UNIT/ML pen Inject 12 Units Subcutaneous every evening 2/24/2024 at 1907    insulin lispro (HUMALOG KWIKPEN) 100 UNIT/ML (1 unit dial) KWIKPEN Inject 5 Units Subcutaneous daily (with dinner)     insulin lispro (HUMALOG KWIKPEN) 100 UNIT/ML (1 unit dial) KWIKPEN Inject 3 Units Subcutaneous daily (with breakfast) 2/25/2024 at 0703    insulin lispro (HUMALOG KWIKPEN) 100 UNIT/ML (1 unit dial) KWIKPEN Inject 4 Units Subcutaneous daily (with lunch)     insulin lispro (HUMALOG KWIKPEN) 100 UNIT/ML (1 unit dial) KWIKPEN Inject Subcutaneous 3 times daily (before meals) -200 = 1 unit, 201-250 = 2 units, 251-300 = 3 units, 301-350 = 4 units, 351-400 = 5  units, 401 or greater = 6 units and call provider 2/25/2024 at 0703    irbesartan (AVAPRO) 300 MG tablet Take 1 tablet (300 mg) by mouth at bedtime 2/24/2024 at 1915    isosorbide mononitrate (IMDUR) 60 MG 24 hr tablet Take 1 tablet (60 mg) by mouth every evening (Patient taking differently: Take 60 mg by mouth daily) 2/25/2024 at 1253    Lidocaine (LIDOCARE) 4 % Patch Place 1 patch onto the skin daily as needed for moderate pain To prevent lidocaine toxicity, patient should be patch free for 12 hrs daily.     mirtazapine (REMERON) 15 MG tablet Take 1 tablet (15 mg) by mouth at bedtime 2/24/2024 at 1915    ramelteon (ROZEREM) 8 MG tablet Take 1 tablet (8 mg) by mouth nightly as needed for sleep 2/5/2024 at 2045    tamsulosin (FLOMAX) 0.4 MG capsule Take 1 capsule (0.4 mg) by mouth every morning 2/25/2024 at 1253

## 2024-02-26 NOTE — ED NOTES
Spoke with AUGUSTIN Gallegos on [facility]. Communicated that evening sliding scale insulin was given in ED, that additional questions were asked to Dr. Guerrero via text and a response is pending. Explained that an additional POCT BG was requested by Dr. Guerrero with the request that this value be paged to him. Receiving RN verbalized understanding.

## 2024-02-26 NOTE — PROGRESS NOTES
[facility]    Medicine Progress Note - Hospitalist Service    Date of Admission:  2/25/2024    Assessment & Plan   Tc Garcia is a 84 year old male complex past medical history including brittle type 1 diabetes, on prandial and Lantus insulin, history hypoglycemia, hypertension, dyslipidemia, chronic atrial fibrillation, heart failure with preserved ejection fraction history, pulmonary hypertension, recurrent left pleural effusion, CKD stage III, chronic anemia, mood disorder, history of intracranial hemorrhage December 2023 recent hospital admission earlier this month [facility] for uncontrolled type 1 diabetes and Klebsiella UTI who presents with fall and vertebral fractures.  -Mechanical fall with associated mild compression deformities at C6 and T1 likely acute/subacute.  Placed in cervical hard collar to be worn at all times.  Also complains of shortness of breath for 1 to 2 weeks.     Mechanical fall with associated compression deformities at C6 and T1  -No neurologic complaints.  Nonfocal neuroexam  -Seen by neurosurgery on admission.  -Placed in c-collar to be worn at all times  Plan-discharge observation, fall precautions, c-collar to be worn at all times.,  Cervical x-ray AP lateral x-rays ordered for the morning while in c-collar.  Patient will need neurosurgery follow-up in 6 weeks with repeat x-rays.  Neurosurgery consult.  - Monitor on tele   - Maintain fall precautions   - PT consult 2/26, recommend TCU at discharge, however patient is very reluctant to this. Wishes to go home to [facility] with wife, whom he says is his primary caretaker. Of note, patient's wife has dementia and uses a walker. He understands TCU is the recommendation of both PT and hospitalist service, and that he remains at high risk for fall. We discussed that he should return to the emergency department if he does not feel safe or cannot manage at home. He acknowledged understanding and still  wishes to be discharged home pending final recommendations from neurosurgery.       Uncontrolled DM, type 1  -Recently hospitalized [facility].  See discharge summary.  Admitted to [facility] status for continuous insulin infusion with normal numerous prior hospitalizations.  Recent hospitalization at [facility] following intracranial hemorrhage.  Required endocrinology involvement for management of his type 1 diabetes.  Patient deemed not appropriate for insulin pump due to cognitive concerns.  See [facility] discharge summary 12/29/2023  -Med rec completed by Pharm.D.  Patient insulin administered by assisted living staff.  -Is unclear if he is on bedtime sliding scale.  Patient seems to think he does receive short acting insulin at bedtime.  -Blood glucose 150 range today  - Hypoglycemic protocol in place  -Continue Lantus 12 units at bedtime, continue lispro/aspart 3 units with breakfast, 4 units with lunch and 5 units with dinner.  This varies somewhat by approximately 1 unit at home.  -Continue custom insulin sliding scale for meals       Hypertension  -continue prior to admission amlodipine 10 mg daily, Coreg 12.5 mg twice daily, irbesartan 300 mg nightly, Imdur 60mg daily  -Continue to hold Lasix and irbesartan 2/2 elevated creatinine, reassess in AM  - Trend BMP in AM     Shortness of breath, now resolved  -On review of systems patient did endorse shortness of breath seems mild onset approximately 1 to 2 weeks ago.  Patient is a vague historian.  He feels slightly short of breath at this time.  No orthopnea or PND.  Chronic lower extremity edema stable.  No chest pain.  No cough fevers or chills.  -Euvolemic on exam 2/26  -CT chest shows small right pleural effusion and trace left pleural effusion.  Mild emphysema.  No infiltrates or CHF.  -EKG without ischemic changes, troponin trended, flat.   - Continue to monitor      History of CHF preserved ejection fraction  -Patient started on Lasix 20 mg daily on  January 29 in cardiology clinic.  He had some peripheral edema but clear lungs and no complaints of dyspnea.  Weight at that time was 66.7 kg.  -Echo 12/2023: showed normal LV size and function EF 60 to 65%.  RV lead dilated with normal RV function.  Severe biatrial enlargement.  RV systolic pressure 54+ RA pressure.  1-2+ TR  - Monitor I's and O's  - Holding lasix as above      Paroxysmal atrial fibrillation  -History of traumatic intracranial bleed while on anticoagulation.  Anticoagulation held 2023.  See  [facility] discharge summary December 2023  -Seen in follow-up with cardiology January of this year with referral for watchman's device and coordination of care with neurology.  -Anticoagulation currently on hold.  [id number] score of 5  -Patient is currently rate controlled.  He is not on a beta-blocker  -Telemetry overnight.  Follow-up with cardiology as already arranged regarding watchman's device.  For now continue to hold anticoagulation.        BPH-continue Flomax 0.4 mg daily     Mood disorder-continue Remeron 15 mg nightly.  Will hold ramelteon given risk for falls at this time.     Recent UTI earlier this month was hospitalization.  Completed 7 days of Keflex for Klebsiella.  Patient without UTI symptoms or infectious symptoms at this time.     History of intraparenchymal hematoma 12/11/2023 following fall with head injury on anticoagulation.  -Anticoagulation held as discussed above  -Patient seen in follow-up with cardiology January 29.  Cardiology and neurology and discussion regarding watchman implantation.  [id number] or 5.  He would require short-term anticoagulation with long-term antiplatelet therapy/aspirin.  Referral sent to Dr. Dubois for watchman consideration.    Loose stools   Patient was scheduled for outpatient EGD today, however he missed this appointment because he was admitted and not NPO in preparation for procedure.   -Spoke to GI today, have patient call scheduling and they will  re-schedule ASAP.        Observation Goals: -diagnostic tests and consults completed and resulted, -vital signs normal or at patient baseline, -tolerating oral intake to maintain hydration, -adequate pain control on oral analgesics, -dyspnea improved and O2 sats greater than 88% on room air or prior home oxygen levels, -safe disposition plan has been identified, Nurse to notify provider when observation goals have been met and patient is ready for discharge.  Diet: Moderate Consistent Carb (60 g CHO per Meal) Diet  Diet    DVT Prophylaxis: Pneumatic Compression Devices  Pruett Catheter: Not present  Lines: None     Cardiac Monitoring: ACTIVE order. Indication: sob  Code Status: No CPR- Do NOT Intubate      Clinically Significant Risk Factors Present on Admission                  # Hypertension: Noted on problem list  # Chronic heart failure with preserved ejection fraction: heart failure noted on problem list and last echo with EF >50%    # DMII: A1C = 7.7 % (Ref range: <5.7 %) within past 6 months       # Financial/Environmental Concerns:           Disposition Plan      Expected Discharge Date: 02/27/2024                  The patient's care was discussed with the Attending Physician, Dr. Daley .    OM[facility]  Hospitalist Service  [facility]  Securely message with WebPT (more info)  Text page via [facility] Paging/Directory   ______________________________________________________________________    Interval History   No acute events overnight. Patient resting comfortably in bed this morning with c-collar in place. Anxious to get home. Discussed possible TCU placement, however patient is very reluctant to this. He understands TCU is the recommendation of both PT and hospitalist service, and that he remains at high risk for fall. We discussed that he should return to the emergency department if he does not feel safe or cannot manage at home. He acknowledged understanding and still  wishes to be discharged home. He understands that we are waiting for further recommendations from neurosurgery before he can be discharged.      Physical Exam   Vital Signs: Temp: 98.9  F (37.2  C) Temp src: Oral BP: 114/66 Pulse: 59   Resp: 16 SpO2: 93 % O2 Device: None (Room air)    Weight: 145 lbs 3.2 oz    Physical Exam  Vitals and nursing note reviewed.   HENT:      Head: Atraumatic.   Eyes:      Pupils: Pupils are equal, round, and reactive to light.   Neck:      Comments: C-collar in place  Cardiovascular:      Rate and Rhythm: Normal rate and regular rhythm.      Pulses: Normal pulses.      Heart sounds: Normal heart sounds. No murmur heard.     No friction rub. No gallop.   Pulmonary:      Effort: Pulmonary effort is normal.      Breath sounds: Normal breath sounds.   Abdominal:      General: There is no distension.      Palpations: Abdomen is soft.      Tenderness: There is no abdominal tenderness.   Musculoskeletal:         General: Normal range of motion.   Skin:     General: Skin is warm and dry.      Capillary Refill: Capillary refill takes less than 2 seconds.   Neurological:      General: No focal deficit present.      Mental Status: He is alert and oriented to person, place, and time.   Psychiatric:         Mood and Affect: Mood normal.         Behavior: Behavior normal.         Thought Content: Thought content normal.         Judgment: Judgment normal.           Medical Decision Making       45 MINUTES SPENT BY ME on the date of service doing chart review, history, exam, documentation & further activities per the note.      Data     I have personally reviewed the following data over the past 24 hrs:    10.4  \   10.1 (L)   / 260     139 102 28.4 (H) /  135 (H)   4.0 25 1.81 (H) \     Trop: 46 (H) BNP: N/A       Imaging results reviewed over the past 24 hrs:   Recent Results (from the past 24 hour(s))   CT Head w/o Contrast    Narrative    EXAM: CT HEAD W/O CONTRAST  LOCATION: [facility]  DATE: 2/25/2024    INDICATION: pain after fall blunt trauma today  COMPARISON: None.  TECHNIQUE: Routine CT Head without IV contrast. Multiplanar reformats. Dose reduction techniques were used.    FINDINGS:  INTRACRANIAL CONTENTS: No intracranial hemorrhage, extraaxial collection, or mass effect.  No CT evidence of acute infarct.     Mild to moderate presumed chronic small vessel ischemic changes.     Moderate to severe ventriculomegaly with ventricles enlarged disproportionate to the sulcal atrophy. Findings may reflect normal pressure hydrocephalus, central brain atrophy, or extraventricular noncommunicating hydrocephalus. Please correlate   clinically.      VISUALIZED ORBITS/SINUSES/MASTOIDS: No intraorbital abnormality. Mild mucosal thickening scattered about the paranasal sinuses. No middle ear or mastoid effusion.    BONES/SOFT TISSUES: No acute abnormality.      Impression    IMPRESSION:  1.  No acute intracranial process.    2.  Chronic intracranial changes described above.   CT Cervical Spine w/o Contrast    Narrative    EXAM: CT CERVICAL SPINE W/O CONTRAST  LOCATION: [facility]  DATE: 2/25/2024    INDICATION: pain after fall blunt trauma today  COMPARISON: CT cervical spine 12/11/2023, MRI thoracic spine 04/05/2023  TECHNIQUE: Routine CT Cervical Spine without IV contrast. Multiplanar reformats. Dose reduction techniques were used.    FINDINGS:  VERTEBRA:  C4 vertebral body inferior endplate mild central chronic compression deformity similar compared to CT cervical spine 12/11/2023.    C6 vertebral body superior endplate likely demonstrates a subtle acute/subacute fracture with mild compression deformity. Findings are new compared to prior exam.    C7 vertebral body mild to moderate chronic compression deformity similar compared to prior exams.    T1 vertebral body superior endplate likely demonstrates a subtle acute/subacute fracture with mild compression  deformity. Findings are new compared to prior exam.    T2 vertebral body mild to moderate chronic compression deformity similar compared to prior exams.    No additional acute fracture.  No acute post traumatic subluxations.   Remaining vertebral body heights within normal limits. No prevertebral soft tissue swelling.    CANAL/FORAMINA: Multilevel spondylosis. Various levels and degrees of central canal stenosis.  No critical central canal stenosis.  Various levels and degrees of neural foraminal stenosis.  No significant subluxations.    PARASPINAL: No significant extraspinal abnormality.      Impression     IMPRESSION:  1.  C6 vertebral body superior endplate likely demonstrates a subtle acute/subacute fracture with mild compression deformity. Findings are new compared to prior exam.    2.  T1 vertebral body superior endplate likely demonstrates a subtle acute/subacute fracture with mild compression deformity. Findings are new compared to prior exam.    3.  No additional evidence for an acute fracture.    4.  Multiple chronic compression deformities described above.    5.  Degenerative changes described above.     CT Chest w/o Contrast    Narrative    EXAM: CT CHEST W/O CONTRAST  LOCATION: [facility]  DATE: 2/25/2024    INDICATION: pain after fall blunt trauma today  COMPARISON: 08/05/2020.  TECHNIQUE: CT chest without IV contrast. Multiplanar reformats were obtained. Dose reduction techniques were used.  CONTRAST: None.    FINDINGS:   LUNGS AND PLEURA: Small right and trace left pleural effusions with associated atelectasis. There is a persistent subpleural groundglass nodular opacity in the right upper lobe on series 4 image 57 measuring 2.4 x 1.2 cm, relatively similar to prior   exam. Mild emphysema. No pneumothorax. Scattered areas of atelectasis and scarring. Scattered pleural calcifications bilaterally may be from remote asbestos exposure.    MEDIASTINUM/AXILLAE: Cardiomegaly. The  ascending aorta measures 4.1 cm. Similar borderline enlarged mediastinal lymph nodes  Benign calcified mediastinal lymph nodes. No retrosternal hematoma.    CORONARY ARTERY CALCIFICATION: Severe.    UPPER ABDOMEN: Extensive atherosclerosis. Cholecystectomy.    MUSCULOSKELETAL: Degenerative changes of the spine. L1 vertebroplasty changes. Old, healed bilateral rib fractures. Old, healed sternal body fracture with residual posttraumatic deformity. Similar lucency in the right seventh lateral rib with sclerotic   rim. This is been stable since at least 2020 and benign.      Impression    IMPRESSION:   1.  No convincing acute traumatic findings.   2.  Old, healed bilateral rib fractures. No definitive acute rib fractures.  3.  Small right and trace left pleural effusions with associated atelectasis.  4.  Similar subpleural groundglass nodular opacity in the right upper lobe. This is unchanged since at least 2020. Given the relative stability over 4 years. This is favored to be benign. May consider annual surveillance if clinically indicated.  5.  No new or enlarging pulmonary nodules.  6.  Cardiomegaly.     XR Cervical Spine 2/3 Views    Narrative    CERVICAL SPINE TWO TO THREE VIEWS February 26, 2024 8:16 AM     HISTORY: Mild compression deformities at C6 following mechanical  falls. Acute versus subacute.    COMPARISON: CT cervical spine 2/25/2024.      Impression    IMPRESSION: Mild degenerative anterolisthesis of C6 upon C7 again  noted. Mild anterior wedge compression deformities of C6 and C7 again  noted. Vertebral body heights and contours otherwise normal. Facet  arthropathy throughout the cervical spine.    RICHMOND SINGH MD         SYSTEM ID:  [id number]

## 2024-02-26 NOTE — PLAN OF CARE
RECEIVING UNIT ED HANDOFF REVIEW    ED Nurse Handoff Report was reviewed by: Adonay Gomez RN on February 25, 2024 at 9:05 PM

## 2024-02-26 NOTE — ED NOTES
[facility]  ED Nurse Handoff Report    ED Chief complaint: Fall      ED Diagnosis:   Final diagnoses:   Closed nondisplaced fracture of sixth cervical vertebra, unspecified fracture morphology, initial encounter (H)   Closed fracture of first thoracic vertebra, unspecified fracture morphology, initial encounter (H)   Hyperglycemia       Code Status: As determined by admitting hospitalist    Allergies:   Allergies   Allergen Reactions    No Known Drug Allergy        Patient Story: Patient BIBA at approx 1700 for a fall in the shower at 0600. Pt reports feeling in normal health at time of fall. EMS reports pt being unwilling to receive intervention until later in the evening d/t back pain. Also report pt confirming he hit his head, while AL staff report he did not.  Focused Assessment:  Alert and oriented, moving all 4 extremities and following commands on arrival. C/O low back pain, resting between cares. Aspen collar placed in ED per MD direction. BG >300 between meal. Pt provided box lunch per request. Tele Aifb, CVR.     Treatments and/or interventions provided: IV, labs sent, CT, collar placement, sliding scale insulin/glucose monitoring  Patient's response to treatments and/or interventions: Tolerated    To be done/followed up on inpatient unit:  Ongoing admission orders, BG monitoring, update wife when admitted    Does this patient have any cognitive concerns?: Forgetful    Activity level - Baseline/Home:  Unknown  Activity Level - Current:   Stand with assist x2 and per orders    Patient's Preferred language: English   Needed?: No    Isolation: None  Infection: Not Applicable  Patient tested for COVID 19 prior to admission: NO  Bariatric?: No    Vital Signs:   Vitals:    02/25/24 1630 02/25/24 1638 02/25/24 1639   BP:   (!) 145/85   Pulse: 81     Resp: (!) 41     Temp:  98.8  F (37.1  C)    TempSrc:  Oral    SpO2: 95%         Cardiac Rhythm:Cardiac Rhythm: Atrial fibrillation    Was  the PSS-3 completed:   Yes  What interventions are required if any?               Family Comments: Spoke with wife, Radha via telephone. She would like updates when possible; phone number provided to admitting provider.  OBS brochure/video discussed/provided to patient/family: Yes              Name of person given brochure if not patient: Patient              Relationship to patient: self    For the majority of the shift this patient's behavior was Green.   Behavioral interventions performed were none required.    ED NURSE PHONE NUMBER: [phone]

## 2024-02-26 NOTE — PROVIDER NOTIFICATION
MD Notification    Notified Person: MD    Notified Person Name: Yahir Butler MD     Notification Date/Time: 2/26 2:11    Notification Interaction: Gracie     Purpose of Notification: Pts 2am BG is 357. Pt is a brittle diabetic and I was told to monitor BG closely & page with 2am result. Any intervention?    Orders Received: 1x dose of 3 units Novolog ordered.    Comments:

## 2024-02-26 NOTE — ED NOTES
Spoke with spouse, Radha via telephone. Update provided with most current information. Radha requesting update once admit/discharge decision is made. Writer assisted pt to call wife from room phone, currently speaking with each other.     Radha: [phone]

## 2024-02-26 NOTE — CONSULTS
Care Management Initial Consult    General Information  Assessment completed with: Patient,    Type of CM/SW Visit: Initial Assessment    Primary Care Provider verified and updated as needed:     Readmission within the last 30 days:        Reason for Consult: discharge planning  Advance Care Planning: Advance Care Planning Reviewed: present on chart          Communication Assessment  Patient's communication style: spoken language (English or Bilingual)    Hearing Difficulty or Deaf: no        Cognitive  Cognitive/Neuro/Behavioral: WDL  Level of Consciousness: alert  Arousal Level: opens eyes spontaneously     Mood/Behavior: calm, cooperative  Best Language: 0 - No aphasia  Speech: logical, spontaneous, clear    Living Environment:   People in home: spouse, facility resident     Current living Arrangements: assisted living  Name of Facility: [facility]   Able to return to prior arrangements: yes       Family/Social Support:  Care provided by: self  Provides care for: no one  Marital Status:   Wife, Children          Description of Support System: Other (see comments) (Patient is primary caretaker for spouse, daughter uninvolved)    Support Assessment: Lacks adequate physical care    Current Resources:   Patient receiving home care services: No     Community Resources: Other (see comment)  Equipment currently used at home: grab bar, tub/shower, grab bar, toilet, raised toilet seat, shower chair, walker, rolling  Supplies currently used at home: Diabetic Supplies    Employment/Financial:  Employment Status: retired        Financial Concerns:             Does the patient's insurance plan have a 3 day qualifying hospital stay waiver?  Yes     Which insurance plan 3 day waiver is available? Alternative insurance waiver    Will the waiver be used for post-acute placement? Undetermined at this time    Lifestyle & Psychosocial Needs:  Social Determinants of Health     Food Insecurity: Low Risk  (10/16/2023)     Food Insecurity     Within the past 12 months, did you worry that your food would run out before you got money to buy more?: No     Within the past 12 months, did the food you bought just not last and you didn t have money to get more?: No   Depression: Not at risk (2/2/2024)    PHQ-2     PHQ-2 Score: 0   Housing Stability: Low Risk  (10/16/2023)    Housing Stability     Do you have housing? : Yes     Are you worried about losing your housing?: No   Tobacco Use: Medium Risk (2/2/2024)    Patient History     Smoking Tobacco Use: Former     Smokeless Tobacco Use: Never     Passive Exposure: Not on file   Financial Resource Strain: Low Risk  (10/16/2023)    Financial Resource Strain     Within the past 12 months, have you or your family members you live with been unable to get utilities (heat, electricity) when it was really needed?: No   Alcohol Use: Not At Risk (10/27/2020)    AUDIT-C     Frequency of Alcohol Consumption: Never     Average Number of Drinks: Not on file     Frequency of Binge Drinking: Not on file   Transportation Needs: Low Risk  (10/16/2023)    Transportation Needs     Within the past 12 months, has lack of transportation kept you from medical appointments, getting your medicines, non-medical meetings or appointments, work, or from getting things that you need?: No   Physical Activity: Not on file   Interpersonal Safety: Not on file   Stress: Not on file   Social Connections: Not on file       Functional Status:  Prior to admission patient needed assistance:   Dependent ADLs:: Ambulation-walker, Bathing  Dependent IADLs:: Medication Management, Cleaning       Mental Health Status:          Chemical Dependency Status:                Values/Beliefs:  Spiritual, Cultural Beliefs, Roman [employer] Practices, Values that affect care:                 Additional Information:  Spoke with pt for initial consult. Pt was admitted on 2/25/24 for a fall and vertebral fractures, per H&P. Pt confirmed they live at [facility], receive assistance with meals, laundry, cleaning, and medications. Pt states they are the main caretaker for their spouse, who has dementia. Pt states they use a walker, cane, and wheelchair at home. Pt's daughter is involved but doesn't provide much assistance, for supports, pt listed their neighbors. Discussed PT recommendation for TCU at discharge. Pt states they have been before, but not agreeable to the recommendation, due to worry for spouse at home. Pt adamant on not going to TCU for this reason. Discussed possibility of increasing cares at [facility] to get more assistance. Informed that DON at [facility] likely will need to complete assessment with pt to see what kind of cares they need to add on, which would be a financial increase, pt understands. Pt appreciative of SW calling [facility].     Call to [facility] (Phone [phone]). Left VM requesting a return call as soon as possible.    JERE Cobian  Social Work  [facility]

## 2024-02-26 NOTE — PLAN OF CARE
[facility] jail  2 RN skin assessment completed. Yes  Significant findings include: Scattered bruising. Scattered small abrasions. Scattered scabs. All primarily on bilat arms. Old steri strips on R elbow. Redness around penis and scrotum.   [facility] Nurse Consult Ordered? No

## 2024-02-27 NOTE — PROGRESS NOTES
PRIMARY Concern: Fell in the shower AM of 2/25. Initially refused to come to hospital, but changed mind once back/neck pain began worsening. CT shows mild compression deformities at C6 and T1, likely acute/subacute.  SAFETY RISK Concerns (fall risk, behaviors, etc.): Fall risk      Isolation/Type: n/a  Tests/Procedures for NEXT shift: Xray   Consults? (Pending/following, signed-off?) SW & PT pending  Where is patient from? 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 Brittle. Monitor BG closely  Anticipated DC date & active delays: TBD  _____________________________________________________________________________  SUMMARY NOTE:  Orientation/Cognitive: A&Ox4 Forgetful.  Observation Goals (Met/ Not Met): Not met  Mobility Level/Assist Equipment: A1 GB/W  Antibiotics & Plan (IV/po, length of tx left):   Pain Management: Scheduled Tylenol.  Tele/VS/O2: VSS on RA  ABNL Lab/BG: Trops: 153, 135,77,97   Diet: Mod Carb  Bowel/Bladder: Continent. Using Urinal at bed side  Skin Concerns: Scattered bruising. Scattered small abrasions. Scattered scabs. All primarily on bilat arms. Old steri strips on R elbow. Redness around penis and scrotum.   Drains/Devices:   Patient Stated Goal for Today: To go home

## 2024-02-27 NOTE — PROGRESS NOTES
Care Management Discharge Note    Discharge Date: 02/28/2024       Discharge Disposition: Assisted Living    Discharge Services: Transportation Services    Discharge DME:      Discharge Transportation: [facility] wheelchair [id number]    Private pay costs discussed: transportation costs    Does the patient's insurance plan have a 3 day qualifying hospital stay waiver?  Yes     Which insurance plan 3 day waiver is available? Alternative insurance waiver    Will the waiver be used for post-acute placement? No    PAS Confirmation Code:    Patient/family educated on Medicare website which has current facility and service quality ratings: no    Education Provided on the Discharge Plan:    Persons Notified of Discharge Plans: Hospitalist, Charge RN, bedside RN, pt   Patient/Family in Agreement with the Plan: unable to assess    Handoff Referral Completed: No    Additional Information:  Spoke with Yeimy from [facility] ([phone]), discussed discharge planning and pt's wishes to return to [facility] as soon as possible, agreeable to increase services if needed. Yeimy stated they can assess pt upon return, increase help, and is okay with pt returning today. Yeimy requested recent provider note, MAR, and PT note. Faxed to [phone]. Yeimy also requested a home care referral for RN/PT/OT/HHA to [facility]. Yeimy would like pt to return before 1400 today, states she is able to fax over medications to their pharmacy as long as they are listed on the discharge orders. Updated hospitalist, requested discharge orders. Spoke with pt, who is appreciative and agreeable for returning home with home care. Pt understands his nursing staff will assess him to see if extra services are needed also. Pt confirmed [facility] wheelchair transport is needed, understands fees and is agreeable. Scheduled [facility] wheelchair transport for [phone]. Pt aware.     Addendum 1122: Sent discharge orders to [facility] via [employer]  tab.    Maris Burks, JERE  Social Work  [facility]

## 2024-02-27 NOTE — PROGRESS NOTES
Observation goals  PRIOR TO DISCHARGE        -diagnostic tests and consults completed and resulted-not met  -vital signs normal or at patient baseline-met.  -tolerating oral intake to maintain hydration-met.  -adequate pain control on oral analgesics-met.  -dyspnea improved and O2 sats greater than 88% on room air or prior home oxygen levels-met.  -safe disposition plan has been identified-not met.  Nurse to notify provider when observation goals have been met and patient is ready for discharge.

## 2024-02-27 NOTE — PROGRESS NOTES
PRIMARY Concern: Fall. CT shows mild compression deformities at C6 and T1, likely acute/subacute.  SAFETY RISK Concerns (fall risk, behaviors, etc.): Fall risk      Isolation/Type: N/A  Tests/Procedures for NEXT shift: None   Consults? (Pending/following, signed-off?) SW following   Where is patient from? 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 Brittle. Monitor BG closely  Anticipated DC date & active delays: TBD  _____________________________________________________________________________  SUMMARY NOTE:  Orientation/Cognitive: A&O x4 Forgetful.  Observation Goals (Met/ Not Met): Not met  Mobility Level/Assist Equipment: A1/GB/W  Antibiotics & Plan (IV/po, length of tx left): None  Pain Management: Scheduled Tylenol.  Tele/VS/O2: VSS on RA  ABNL Lab/BG: See chart  Diet: Mod Carb  Bowel/Bladder: Incontinent at times  Skin Concerns: Scattered bruising and scabs   Drains/Devices: PIV SL  Patient Stated Goal for Today: Rest         Observation Goals:     -diagnostic tests and consults completed and resulted: Not met  -vital signs normal or at patient baseline: Met  -tolerating oral intake to maintain hydration: Met  -adequate pain control on oral analgesics: Met  -dyspnea improved and O2 sats greater than 88% on room air or prior home oxygen levels: Met  -safe disposition plan has been identified: Not met    Nurse to notify provider when observation goals have been met and patient is ready for discharge.

## 2024-02-27 NOTE — PROVIDER NOTIFICATION
MD Notification    Notified Person: MD    Notified Person Name: Dr. Horan    Notification Date/Time: 02/26 2141    Notification Interaction: Vocera    Purpose of Notification: FYI: pt w/ Hx of A-fib. Tele shows A-fib w/ SVR w/ HR 30s-60S. EKG ordered, showing A-fib with Slow Ventricular response. Thanks    Orders Received: OK    Comments:

## 2024-02-27 NOTE — DISCHARGE SUMMARY
[facility]  Hospitalist Discharge Summary      Date of Admission:  2/25/2024  Date of Discharge:  2/27/2024  Discharging Provider: OMAR Nolen CNP  Discharge Service: Hospitalist Service    Discharge Diagnoses   Mechanical fall with associated compression deformities at C6 and T1  Uncontrolled DM, type 1    Clinically Significant Risk Factors     # DMII: A1C = 7.7 % (Ref range: <5.7 %) within past 6 months       Follow-ups Needed After Discharge   Follow-up Appointments     Follow-up and recommended labs and tests       Follow up with primary care provider, within 7 days to evaluate   medication change and for hospital follow- up.  The following labs/tests   are recommended: BMP on Thursday, February 29 to re-evaluate creatinine   and determine if Lasix and/or Avapro can be restarted.    Follow up with neurosurgery in 6 weeks for repeat x-rays. Wear cervical   collar at all times until cleared by neurosurgery to remove collar.        {Additional follow-up instructions/to-do's for PCP    :    Unresulted Labs Ordered in the Past 30 Days of this Admission       No orders found from 1/26/2024 to 2/26/2024.        These results will be followed up by patient's PCP, hospitalist team and neurosurgery.     Discharge Disposition   Discharged to home  Condition at discharge: Stable    Hospital Course   Tc Garcia is a 84 year old male complex past medical history including brittle type 1 diabetes, on prandial and Lantus insulin, history hypoglycemia, hypertension, dyslipidemia, chronic atrial fibrillation, heart failure with preserved ejection fraction history, pulmonary hypertension, recurrent left pleural effusion, CKD stage III, chronic anemia, mood disorder, history of intracranial hemorrhage December 2023 recent hospital admission earlier this month [facility] for uncontrolled type 1 diabetes and Klebsiella UTI who presents with fall and vertebral  fractures.  -Mechanical fall with associated mild compression deformities at C6 and T1 likely acute/subacute.  Placed in cervical hard collar to be worn at all times.  Also complained of shortness of breath for 1 to 2 weeks.     Mechanical fall with associated compression deformities at C6 and T1  -No neurologic complaints.  Nonfocal neuroexam  -Seen by neurosurgery on admission.  -Placed in c-collar to be worn at all times  Plan-discharge observation, fall precautions, c-collar to be worn at all times.,  Cervical x-ray AP lateral x-rays ordered for the morning while in c-collar.  Patient will need neurosurgery follow-up in 6 weeks with repeat x-rays.  Neurosurgery consult.  - Monitor on tele   - Maintain fall precautions   - PT consult 2/26, recommend TCU at discharge, however patient is very reluctant to this. Wishes to go home to [facility] with wife, whom he says is his primary caretaker. Of note, patient's wife has dementia and uses a walker. He understands TCU is the recommendation of both PT and hospitalist service, and that he remains at high risk for fall. We discussed that he should return to the emergency department if he does not feel safe or cannot manage at home. He acknowledged understanding and still wishes to be discharged back to [facility]     Uncontrolled DM, type 1  -Recently hospitalized [facility].  See discharge summary.  [place] [facility] [place] with normal numerous prior hospitalizations.  Recent hospitalization at [facility] following intracranial hemorrhage.  Required endocrinology involvement for management of his type 1 diabetes.  Patient deemed not appropriate for insulin pump due to cognitive concerns.  See [facility] discharge summary 12/29/2023  -Med rec completed by Pharm.D.  Patient insulin administered by assisted living staff.  -Is unclear if he is on bedtime sliding scale.  Patient seems to think he does receive short acting insulin at bedtime.  -  Hypoglycemic overnight, blood glucose improved this morning to 78. Custom sliding scale insulin discontinued 2/2 hypoglycemia and decreased PO intake; continue medium sliding scale at this time.   - Hypoglycemic protocol in place      Hypertension  -continue prior to admission amlodipine 10 mg daily, Coreg 12.5 mg twice daily, irbesartan 300 mg nightly, Imdur 60mg daily  -Continue to hold Lasix and irbesartan 2/2 elevated creatinine, reassess in AM     Shortness of breath, now resolved  -On review of systems patient did endorse shortness of breath seems mild onset approximately 1 to 2 weeks ago.  Patient is a vague historian.  He feels slightly short of breath at this time.  No orthopnea or PND.  Chronic lower extremity edema stable.  No chest pain.  No cough fevers or chills.  -Euvolemic on exam 2/26  -CT chest shows small right pleural effusion and trace left pleural effusion.  Mild emphysema.  No infiltrates or CHF.  -EKG without ischemic changes, troponin trended, flat.      History of CHF preserved ejection fraction  -Patient started on Lasix 20 mg daily on January 29 in cardiology clinic.  He had some peripheral edema but clear lungs and no complaints of dyspnea.  Weight at that time was 66.7 kg.  -Echo 12/2023: showed normal LV size and function EF 60 to 65%.  RV lead dilated with normal RV function.  Severe biatrial enlargement.  RV systolic pressure 54+ RA pressure.  1-2+ TR  - Holding lasix as above      Paroxysmal atrial fibrillation  -History of traumatic intracranial bleed while on anticoagulation.  Anticoagulation held 2023.  See  [facility] discharge summary December 2023  -Seen in follow-up with cardiology January of this year with referral for watchman's device and coordination of care with neurology.  -Anticoagulation currently on hold.  [id number] score of 5  -Patient is currently rate controlled.  He is not on a beta-blocker  -Telemetry overnight.  Follow-up with cardiology as already arranged  regarding watchman's device.  For now continue to hold anticoagulation.     BPH-continue Flomax 0.4 mg daily     Mood disorder-continue Remeron 15 mg nightly.  Will hold ramelteon given risk for falls at this time.     History of intraparenchymal hematoma 12/11/2023 following fall with head injury on anticoagulation.  -Anticoagulation held as discussed above  -Patient seen in follow-up with cardiology January 29.  Cardiology and neurology and discussion regarding watchman implantation.  [id number] or 5.  He would require short-term anticoagulation with long-term antiplatelet therapy/aspirin.  Referral sent to Dr. Dubois for watchman consideration.     Loose stools   Patient was scheduled for outpatient EGD today, however he missed this appointment because he was admitted and not NPO in preparation for procedure.   -Spoke to GI, have patient call scheduling and they will re-schedule ASAP.    Consultations This Hospital Stay   CARE MANAGEMENT / SOCIAL WORK IP CONSULT  PHYSICAL THERAPY ADULT IP CONSULT    Code Status   No CPR- Do NOT Intubate    Time Spent on this Encounter   I, OMAR Nolen CNP, personally saw the patient today and spent greater than 30 minutes discharging this patient.       OMAR Nolen CNP  [facility]  [address] [place] [id number]  Phone: [phone]  ______________________________________________________________________    Physical Exam   Vital Signs: Temp: 97.3  F (36.3  C) Temp src: Oral BP: (!) 132/93 Pulse: 56   Resp: 18 SpO2: 100 % O2 Device: None (Room air)    Weight: 144 lbs 0 oz  Physical Exam  Vitals and nursing note reviewed.   HENT:      Mouth/Throat:      Mouth: Mucous membranes are moist.   Neck:      Comments: Hard c-collar in place  Cardiovascular:      Rate and Rhythm: Normal rate and regular rhythm.      Pulses: Normal pulses.      Heart sounds: Normal heart sounds. No murmur heard.     No  friction rub. No gallop.   Pulmonary:      Effort: Pulmonary effort is normal.      Breath sounds: Normal breath sounds.   Abdominal:      General: There is no distension.      Palpations: Abdomen is soft.      Tenderness: There is no abdominal tenderness.   Musculoskeletal:      Cervical back: No tenderness.   Skin:     General: Skin is warm and dry.      Capillary Refill: Capillary refill takes less than 2 seconds.   Neurological:      General: No focal deficit present.      Mental Status: He is alert. Mental status is at baseline.   Psychiatric:         Mood and Affect: Mood normal.         Behavior: Behavior normal.         Thought Content: Thought content normal.         Judgment: Judgment normal.              Primary Care Physician   Physician No Ref-Primary    Discharge Orders      Home Care Referral      Reason for your hospital stay    You were hospitalized following a mechanical fall with associated mild compression deformities at C6 and T1. You were placed in cervical hard collar to be worn at all times.     Activity    Your activity upon discharge: activity as tolerated  Please wear your cervical collar until you see neurosurgery in 6 weeks.     Follow-up and recommended labs and tests     Follow up with primary care provider, within 7 days to evaluate medication change and for hospital follow- up.  The following labs/tests are recommended: BMP on Thursday, February 29 to re-evaluate creatinine and determine if Lasix and/or Avapro can be restarted.    Follow up with neurosurgery in 6 weeks for repeat x-rays. Wear cervical collar at all times until cleared by neurosurgery to remove collar.     Domingo HALEY, the undersigned, certify that the above prescribed supplies are medically necessary for this patient and is both reasonable and necessary in reference to accepted standards of medical and necessary in reference to accepted standards of medical practice in the treatment of this patient's condition and is  not prescribed as a convenience.      Diet    Follow this diet upon discharge: Orders Placed This Encounter      Moderate Consistent Carb (60 g CHO per Meal) Diet       Significant Results and Procedures   Most Recent 3 CBC's:  Recent Labs   Lab Test 02/26/24  0714 02/25/24  1625 02/11/24  0750   WBC 10.4 14.6* 12.2*   HGB 10.1* 10.6* 9.8*   MCV 90 89 90    274 248     Most Recent 3 BMP's:  Recent Labs   Lab Test 02/27/24  0824 02/27/24  0715 02/27/24  0652 02/26/24  0729 02/26/24  0714 02/25/24  1729 02/25/24  1625 02/11/24  0907 02/11/24  0750   NA  --   --   --   --  139  --  136  --  140   POTASSIUM  --   --   --   --  4.0  --  4.1  --  3.9   CHLORIDE  --   --   --   --  102  --  98  --  103   CO2  --   --   --   --  25  --  24  --  28   BUN  --   --   --   --  28.4*  --  24.4*  --  29.0*   CR  --   --   --   --  1.81*  --  1.71*  --  1.72*   ANIONGAP  --   --   --   --  12  --  14  --  9   LLOYD  --   --   --   --  9.0  --  9.3  --  9.1   GLC 78 88 40*   < > 156*   < > 307*   < > 162*    < > = values in this interval not displayed.   ,   Results for orders placed or performed during the hospital encounter of 02/25/24   CT Cervical Spine w/o Contrast    Narrative    EXAM: CT CERVICAL SPINE W/O CONTRAST  LOCATION: [facility]  DATE: 2/25/2024    INDICATION: pain after fall blunt trauma today  COMPARISON: CT cervical spine 12/11/2023, MRI thoracic spine 04/05/2023  TECHNIQUE: Routine CT Cervical Spine without IV contrast. Multiplanar reformats. Dose reduction techniques were used.    FINDINGS:  VERTEBRA:  C4 vertebral body inferior endplate mild central chronic compression deformity similar compared to CT cervical spine 12/11/2023.    C6 vertebral body superior endplate likely demonstrates a subtle acute/subacute fracture with mild compression deformity. Findings are new compared to prior exam.    C7 vertebral body mild to moderate chronic compression deformity similar compared to  prior exams.    T1 vertebral body superior endplate likely demonstrates a subtle acute/subacute fracture with mild compression deformity. Findings are new compared to prior exam.    T2 vertebral body mild to moderate chronic compression deformity similar compared to prior exams.    No additional acute fracture.  No acute post traumatic subluxations.   Remaining vertebral body heights within normal limits. No prevertebral soft tissue swelling.    CANAL/FORAMINA: Multilevel spondylosis. Various levels and degrees of central canal stenosis.  No critical central canal stenosis.  Various levels and degrees of neural foraminal stenosis.  No significant subluxations.    PARASPINAL: No significant extraspinal abnormality.      Impression     IMPRESSION:  1.  C6 vertebral body superior endplate likely demonstrates a subtle acute/subacute fracture with mild compression deformity. Findings are new compared to prior exam.    2.  T1 vertebral body superior endplate likely demonstrates a subtle acute/subacute fracture with mild compression deformity. Findings are new compared to prior exam.    3.  No additional evidence for an acute fracture.    4.  Multiple chronic compression deformities described above.    5.  Degenerative changes described above.     CT Head w/o Contrast    Narrative    EXAM: CT HEAD W/O CONTRAST  LOCATION: [facility]  DATE: 2/25/2024    INDICATION: pain after fall blunt trauma today  COMPARISON: None.  TECHNIQUE: Routine CT Head without IV contrast. Multiplanar reformats. Dose reduction techniques were used.    FINDINGS:  INTRACRANIAL CONTENTS: No intracranial hemorrhage, extraaxial collection, or mass effect.  No CT evidence of acute infarct.     Mild to moderate presumed chronic small vessel ischemic changes.     Moderate to severe ventriculomegaly with ventricles enlarged disproportionate to the sulcal atrophy. Findings may reflect normal pressure hydrocephalus, central brain  atrophy, or extraventricular noncommunicating hydrocephalus. Please correlate   clinically.      VISUALIZED ORBITS/SINUSES/MASTOIDS: No intraorbital abnormality. Mild mucosal thickening scattered about the paranasal sinuses. No middle ear or mastoid effusion.    BONES/SOFT TISSUES: No acute abnormality.      Impression    IMPRESSION:  1.  No acute intracranial process.    2.  Chronic intracranial changes described above.   CT Chest w/o Contrast    Narrative    EXAM: CT CHEST W/O CONTRAST  LOCATION: [facility]  DATE: 2/25/2024    INDICATION: pain after fall blunt trauma today  COMPARISON: 08/05/2020.  TECHNIQUE: CT chest without IV contrast. Multiplanar reformats were obtained. Dose reduction techniques were used.  CONTRAST: None.    FINDINGS:   LUNGS AND PLEURA: Small right and trace left pleural effusions with associated atelectasis. There is a persistent subpleural groundglass nodular opacity in the right upper lobe on series 4 image 57 measuring 2.4 x 1.2 cm, relatively similar to prior   exam. Mild emphysema. No pneumothorax. Scattered areas of atelectasis and scarring. Scattered pleural calcifications bilaterally may be from remote asbestos exposure.    MEDIASTINUM/AXILLAE: Cardiomegaly. The ascending aorta measures 4.1 cm. Similar borderline enlarged mediastinal lymph nodes  Benign calcified mediastinal lymph nodes. No retrosternal hematoma.    CORONARY ARTERY CALCIFICATION: Severe.    UPPER ABDOMEN: Extensive atherosclerosis. Cholecystectomy.    MUSCULOSKELETAL: Degenerative changes of the spine. L1 vertebroplasty changes. Old, healed bilateral rib fractures. Old, healed sternal body fracture with residual posttraumatic deformity. Similar lucency in the right seventh lateral rib with sclerotic   rim. This is been stable since at least 2020 and benign.      Impression    IMPRESSION:   1.  No convincing acute traumatic findings.   2.  Old, healed bilateral rib fractures. No definitive  acute rib fractures.  3.  Small right and trace left pleural effusions with associated atelectasis.  4.  Similar subpleural groundglass nodular opacity in the right upper lobe. This is unchanged since at least 2020. Given the relative stability over 4 years. This is favored to be benign. May consider annual surveillance if clinically indicated.  5.  No new or enlarging pulmonary nodules.  6.  Cardiomegaly.     XR Cervical Spine 2/3 Views    Narrative    CERVICAL SPINE TWO TO THREE VIEWS February 26, 2024 8:16 AM     HISTORY: Mild compression deformities at C6 following mechanical  falls. Acute versus subacute.    COMPARISON: CT cervical spine 2/25/2024.      Impression    IMPRESSION: Mild degenerative anterolisthesis of C6 upon C7 again  noted. Mild anterior wedge compression deformities of C6 and C7 again  noted. Vertebral body heights and contours otherwise normal. Facet  arthropathy throughout the cervical spine.    RICHMOND SINGH MD         SYSTEM ID:  [id number]       Discharge Medications   Current Discharge Medication List        CONTINUE these medications which have NOT CHANGED    Details   amLODIPine (NORVASC) 10 MG tablet Take 1 tablet (10 mg) by mouth daily  Qty: 30 tablet, Refills: 0    Associated Diagnoses: Essential hypertension      carvedilol (COREG) 12.5 MG tablet Take 1 tablet (12.5 mg) by mouth 2 times daily (with meals)  Qty: 60 tablet, Refills: 0    Associated Diagnoses: Essential hypertension      insulin glargine (LANTUS PEN) 100 UNIT/ML pen Inject 12 Units Subcutaneous every evening    Comments: If Lantus is not covered by insurance, may substitute Basaglar or Semglee or other insulin glargine product per insurance preference at same dose and frequency.        !! insulin lispro (HUMALOG KWIKPEN) 100 UNIT/ML (1 unit dial) KWIKPEN Inject 5 Units Subcutaneous daily (with dinner)      !! insulin lispro (HUMALOG KWIKPEN) 100 UNIT/ML (1 unit dial) KWIKPEN Inject 3 Units Subcutaneous daily (with  breakfast)      !! insulin lispro (HUMALOG KWIKPEN) 100 UNIT/ML (1 unit dial) KWIKPEN Inject 4 Units Subcutaneous daily (with lunch)      !! insulin lispro (HUMALOG KWIKPEN) 100 UNIT/ML (1 unit dial) KWIKPEN Inject Subcutaneous 3 times daily (before meals) -200 = 1 unit, 201-250 = 2 units, 251-300 = 3 units, 301-350 = 4 units, 351-400 = 5 units, 401 or greater = 6 units and call provider      isosorbide mononitrate (IMDUR) 60 MG 24 hr tablet Take 1 tablet (60 mg) by mouth every evening  Qty: 30 tablet, Refills: 0    Associated Diagnoses: Essential hypertension      Lidocaine (LIDOCARE) 4 % Patch Place 1 patch onto the skin daily as needed for moderate pain To prevent lidocaine toxicity, patient should be patch free for 12 hrs daily.    Associated Diagnoses: Intraparenchymal hemorrhage of brain (H)      mirtazapine (REMERON) 15 MG tablet Take 1 tablet (15 mg) by mouth at bedtime  Qty: 30 tablet, Refills: 0    Associated Diagnoses: Intraparenchymal hemorrhage of brain (H)      ramelteon (ROZEREM) 8 MG tablet Take 1 tablet (8 mg) by mouth nightly as needed for sleep    Associated Diagnoses: Intraparenchymal hemorrhage of brain (H)      tamsulosin (FLOMAX) 0.4 MG capsule Take 1 capsule (0.4 mg) by mouth every morning  Qty: 30 capsule, Refills: 0    Associated Diagnoses: Intraparenchymal hemorrhage of brain (H)      Alcohol Swabs PADS Use to swab the area of the injection or bina as directed Per insurance coverage  Qty: 100 each, Refills: 0    Associated Diagnoses: Inadequately controlled diabetes mellitus (H)      blood glucose (NO BRAND SPECIFIED) lancets standard To use to test glucose level in the blood Use to test blood sugar  4  times daily as directed. To accompany glucose monitor brands per insurance coverage.  Qty: 100 each, Refills: 0    Associated Diagnoses: Inadequately controlled diabetes mellitus (H)      blood glucose (NO BRAND SPECIFIED) test strip To use to test glucose level in the blood Use to  test blood sugar  4 times daily as directed. To accompany glucose monitor brands per insurance coverage.  Qty: 100 strip, Refills: 0    Associated Diagnoses: Inadequately controlled diabetes mellitus (H)      blood glucose monitoring (NO BRAND SPECIFIED) meter device kit Use as directed Per insurance coverage  Qty: 1 kit, Refills: 0    Associated Diagnoses: Inadequately controlled diabetes mellitus (H)      Continuous Blood Gluc  (DEXCOM G6 ) JOSE Dexcom G6    USE EACH WITH 10 DAYS SENSORS      glucose (BD GLUCOSE) 4 g chewable tablet Take 4 tablets by mouth every 15 minutes as needed for low blood sugar  Qty: 50 tablet, Refills: 0    Associated Diagnoses: Inadequately controlled diabetes mellitus (H)      insulin pen needle (32G X 4 MM) 32G X 4 MM miscellaneous Use as directed by provider Per insurance coverage  Qty: 100 each, Refills: 0    Associated Diagnoses: Inadequately controlled diabetes mellitus (H)       !! - Potential duplicate medications found. Please discuss with provider.        STOP taking these medications       furosemide (LASIX) 20 MG tablet Comments:   Reason for Stopping:         irbesartan (AVAPRO) 300 MG tablet Comments:   Reason for Stopping:             Allergies   Allergies   Allergen Reactions    No Known Drug Allergy

## 2024-02-27 NOTE — PLAN OF CARE
Goal Outcome Evaluation:    Orientation-AxOx4 forgetful.    Vitals/Tele-VSS on room air, A-fib with CVR. Cervical collar in place, denies pain.    IV Access/drains-removed prior to discharge.    Diet-carb controlled. Glucose 78, 201. Good appetite.    Mobility-Up with assist of 1, GB/walker.    GI/-voids in bathroom, had loose BM x 2.    Wound/Skin-intact except scattered bruising.    Consults-PT. .    Discharge Plan- discharge instructions and medications reviewed with patient, no further questions.      See Flow sheets for assessment                       Observation goals  PRIOR TO DISCHARGE        -diagnostic tests and consults completed and resulted-met  -vital signs normal or at patient baseline-met.  -tolerating oral intake to maintain hydration-met.  -adequate pain control on oral analgesics-met.  -dyspnea improved and O2 sats greater than 88% on room air or prior home oxygen levels-met.  -safe disposition plan has been identified-met.  Nurse to notify provider when observation goals have been met and patient is ready for discharge.

## 2024-02-27 NOTE — PLAN OF CARE
Physical Therapy Discharge Summary    Reason for therapy discharge:    Discharged to home with home therapy.    Progress towards therapy goal(s). See goals on Care Plan in [facility] electronic health record for goal details.  Goals partially met.  Barriers to achieving goals:   discharge from facility.    Therapy recommendation(s):    Continued therapy is recommended.  Rationale/Recommendations:  HHPT recommended to address functional mobility deficits.

## 2024-03-05 NOTE — PROGRESS NOTES
Clinic Care Coordination Contact    Situation: Patient chart reviewed by care coordinator.    Background: Pt was hospitalized at [facility] from 2/25-2/27 after a fall and was found to have compression deformities. Pt is currently hospitalized at [facility] (admitted on 3/2) with pneumonia.     Assessment: Pt is now living at  an assisted living facility: [facility] where the plan will be for pt to be followed primarily by their in-house PCP team.     Call placed to pt to check-in on how he is doing since returning home from the hospital. Pt reports that he is doing okay, still recovering. Per hospital discharge notes, a referral to [facility] [place] was made and they recommended PCP follow-up in 1 week with CBC, BMP and chest x-ray.     Writer asked pt if he was planning on being seen by [facility] in house provider or whether he was going to continue coming to [facility] to see PCP. Pt stated that he wasn't sure, but he thought it would probably be easier to be seen by in-house, however he hasn't met them yet. Writer offered to call assisted living nurse to discuss further.    BERTHA has left message with [facility] nurse: phone:  [phone]    Plan: Will await call back.    Update: Spoke with nurse at [facility], she verified that pt was seen last week by their in-house NP and will be seen tomorrow for hospital follow-up by the NP, they also verified that pt can get the blood work and chest x-ray onsite.     Called and updated pt, he did not recall seeing the NP last week and he didn't recall being told he was being seen tomorrow. He requested that the [facility] nurse call him as he had some questions.    BERTHA left message for [facility] nurse requesting they call patient to discuss his questions.    Ronda Enrique, [facility]  Social Work Care Coordinator  Phone: [phone]

## 2024-03-18 NOTE — ED TRIAGE NOTES
Patient BIBA from nursing home was positive for the flu over the weekend and has been dealing with increasing BG and weakness. This AM patient took BG was 454 and EMS was called they took BG at 274. Patient also called due to increasing weakness normally gets around with a walker did not feel like he would be able to this morning.

## 2024-03-18 NOTE — ED PROVIDER NOTES
History     Chief Complaint:  Hyperglycemia and Generalized Weakness     The history is provided by the patient.      Tc Garcia is a 84 year old male with history of type 2 diabetes mellitus, CHF, intracranial hemorrhage, and atrial fibrillation who presents to the ED via EMS from nursing home for evaluation of hyperglycemia and generalized weakness. Tc reports he tested positive for the flu recently and has been increasingly weak and hyperglycemic with this. His blood sugar was 454 at home today after 2 pieces of toast, prompting nursing staff to call EMS. Blood sugar was 278 with EMS. He endorses nausea without emesis and shortness of breath this morning, both of which have since resolved. He denies urine symptoms or abdominal pain. Patient has been taking Theraflu since his positive flu test. He notes he fell last week and sustained a fracture in his neck and arrives to the ED in C-collar.  He denies any new weakness or numbness to the upper or lower extremities.  Reports no recent falls.    Independent Historian:   None - Patient Only    Review of External Notes:   Patient was admitted for influenza A March 2, 2024.    Medications:    Coreg  Lasix  Insulin  Avapro  Mirtazapine  Rozerem  Flomax     Past Medical History:    Type 2 diabetes mellitus   Essential hypertension  Plantar fascial fibromatosis  Hyperlipidemia  Recurrent left pleural effusion  ABBIE   Acute respiratory failure   Pulmonary hypertension  Iron deficiency anemia  Anemia due to chronic kidney disease  Atrial fibrillation/flutter  Non-recurrent bilateral inguinal hernia  Cholecystitis  Alcohol use disorder   CHF   Hypoglycemia  Hypoxia  Nephrotic range proteinuria  Secondary hyperparathyroidism  Stage 3b chronic kidney disease   Vitamin D deficiency  Diabetic ketoacidosis   Acute cystitis   Intracranial hemorrhage   Hyperglycemia     Past Surgical History:   Cardioversion   Heart cath  Tonsillectomy   Adenoidectomy   Chest tube drain  "tunneled   Chest tube placed   Chest tube removed  Cholecystectomy   Herniorrhaphy inguinal       Physical Exam   Patient Vitals for the past 24 hrs:   BP Temp Temp src Pulse Resp SpO2 Height Weight   03/18/24 1500 -- -- -- 66 21 92 % -- --   03/18/24 1430 -- -- -- 74 23 95 % -- --   03/18/24 1400 -- -- -- 59 23 91 % -- --   03/18/24 1300 -- -- -- 66 19 91 % -- --   03/18/24 1200 -- -- -- 62 20 93 % -- --   03/18/24 1157 135/86 97.7  F (36.5  C) Oral 64 18 92 % 1.753 m (5' 9\") 68 kg (150 lb)     Physical Exam  Vitals reviewed.   Constitutional:       General: He is not in acute distress.     Appearance: He is not ill-appearing.   HENT:      Head: Normocephalic and atraumatic.   Eyes:      Extraocular Movements: Extraocular movements intact.   Neck:      Comments: Patient has an Aspen collar in place  Cardiovascular:      Rate and Rhythm: Normal rate and regular rhythm.   Pulmonary:      Effort: Pulmonary effort is normal. No respiratory distress.      Breath sounds: Normal breath sounds. No wheezing.   Abdominal:      Palpations: Abdomen is soft.      Tenderness: There is no abdominal tenderness. There is no guarding.   Skin:     General: Skin is warm and dry.   Neurological:      Mental Status: He is alert and oriented to person, place, and time.      GCS: GCS eye subscore is 4. GCS verbal subscore is 5. GCS motor subscore is 6.   Psychiatric:         Behavior: Behavior normal.           Emergency Department Course     ECG results from 03/18/24   EKG 12-lead, tracing only     Value    Systolic Blood Pressure     Diastolic Blood Pressure     Ventricular Rate 66    Atrial Rate     ID Interval     QRS Duration 94        QTc 459    P Axis     R AXIS 31    T Axis -44    Interpretation ECG      Atrial fibrillation  Incomplete right bundle branch block  Abnormal QRS-T angle, consider primary T wave abnormality  Abnormal ECG  When compared with ECG of 26-FEB-2024 21:17,  No significant change was found  Confirmed by " GENERATED REPORT, COMPUTER ([id number]),  Chanel Wilson ([id number]) on 3/18/2024 4:16:40 PM       *Note: Due to a large number of results and/or encounters for the requested time period, some results have not been displayed. A complete set of results can be found in Results Review.       Laboratory:  Labs Ordered and Resulted from Time of ED Arrival to Time of ED Departure   COMPREHENSIVE METABOLIC PANEL - Abnormal       Result Value    Sodium 139      Potassium 4.9      Carbon Dioxide (CO2) 25      Anion Gap 10      Urea Nitrogen 31.3 (*)     Creatinine 1.61 (*)     GFR Estimate 42 (*)     Calcium 9.1      Chloride 104      Glucose 288 (*)     Alkaline Phosphatase 114      AST 18      ALT 18      Protein Total 6.1 (*)     Albumin 4.0      Bilirubin Total 0.4     ROUTINE UA WITH MICROSCOPIC REFLEX TO CULTURE - Abnormal    Color Urine Light Yellow      Appearance Urine Clear      Glucose Urine >=1000 (*)     Bilirubin Urine Negative      Ketones Urine Negative      Specific Gravity Urine 1.014      Blood Urine Negative      pH Urine 6.5      Protein Albumin Urine Negative      Urobilinogen Urine Normal      Nitrite Urine Negative      Leukocyte Esterase Urine Negative      RBC Urine 2      WBC Urine 1      Squamous Epithelials Urine 1     CBC WITH PLATELETS AND DIFFERENTIAL - Abnormal    WBC Count 10.2      RBC Count 3.38 (*)     Hemoglobin 9.5 (*)     Hematocrit 30.3 (*)     MCV 90      MCH 28.1      MCHC 31.4 (*)     RDW 15.5 (*)     Platelet Count 280      % Neutrophils 82      % Lymphocytes 6      % Monocytes 8      % Eosinophils 2      % Basophils 1      % Immature Granulocytes 1      NRBCs per 100 WBC 0      Absolute Neutrophils 8.4 (*)     Absolute Lymphocytes 0.7 (*)     Absolute Monocytes 0.8      Absolute Eosinophils 0.2      Absolute Basophils 0.1      Absolute Immature Granulocytes 0.1      Absolute NRBCs 0.0     CK TOTAL - Normal    CK 41          Procedures   None    Emergency Department Course &  Assessments:  Assessments/Consultations/Discussion of Management or Tests:  ED Course as of 24 0727   Mon Mar 18, 2024   1150 I obtained history and examined the patient as noted above.    1220 WBC: 10.2   1220 Hemoglobin(!): 9.5   1245 Glucose(!): 288   1246 I rechecked the patient and explained findings.    1254 Patient resting comfortably.  Reports no complaints.  States he is feeling great.   1344 Reevaluated patient watching TV sitting comfortably.  Requesting something to drink.   1415 Patient reevaluated.  States he feels much better.  Will ambulate patient at this time pending UA results.   1433 UA shows no signs of infection.  Will ambulate the patient in the ER and plan for discharge.       Interventions:  Medications   sodium chloride 0.9% BOLUS 500 mL (0 mLs Intravenous Stopped 3/18/24 1421)        Independent Interpretation (X-rays, CTs, rhythm strip):  None    Social Determinants of Health affecting care:   None    Disposition:  The patient was discharged to home.     Impression & Plan    CMS Diagnoses: None    Medical Decision Makin-year-old male presenting today with hyperglycemia, generalized weakness.  He states that he has had high glucose recently.  Recent was diagnosed with influenza.  States that today he had 2 pieces of toast and felt weak, his nursing home staff checked his blood sugar it was 454 he was then given insulin.  EMS was then called.  On repeat glucose it was 278.  He states he does feel better at this time.  He was started on IV fluids, repeat blood work obtained.  On multiple reassessments patient reported improvement of his symptoms.  States that he felt much better.  Updated him on CMP results results.  Hemoglobin of 9.5 that is unchanged from 5 days ago.  Patient has a c-collar in place for recent cervical spine fracture.  He has no focal weakness to the upper or lower extremities.  Moving extremities equally and symmetrically.  Reports no neck pain. Provided  instructions regarding the dx as well as any return precautions including any new or worsening symptoms. Pt instructed to f/u with their PCP in 1-2 days. They verbalize agreement, and understanding of the plan. All questions and concerns have been addressed.      Diagnosis:    ICD-10-CM    1. Weakness  R53.1       2. Hyperglycemia  R73.9           Discharge Medications:  Discharge Medication List as of 3/18/2024  3:04 PM           Scribe Disclosure:  TERA, Lucia Souza, am serving as a scribe at 12:00 PM on 3/18/2024 to document services personally performed by Tracey Macdonald DO based on my observations and the provider's statements to me.  3/18/2024   Tracey Macdonald DO Doan, Tiffani, DO  03/19/24 0728

## 2024-03-18 NOTE — ED NOTES
Bed: ED27  Expected date:   Expected time:   Means of arrival:   Comments:   84 M high BS weakness eta 1140

## 2024-03-21 NOTE — TELEPHONE ENCOUNTER
[facility] Call Center    Phone Message    May a detailed message be left on voicemail: yes     Reason for Call: Other:    Grace with [facility] calling with questions regarding upcoming appointment on 04/30/2024. She would like clarification on what will be discussed at the appointment. Appointment is scheduled as stroke follow up, but patient is under the impression that it should be a follow up for something else. Grace requesting call to clarify, [phone].     Action Taken: Message routed to:  Other: [facility]    Travel Screening: Not Applicable

## 2024-04-09 NOTE — TELEPHONE ENCOUNTER
"Per Tiffanie Andrews PA-C on 2/25    \"Neurosurgery recommendations:  -Cervical hard collar to be worn at all times.   -Cervical AP/LAT xrays  -Follow up with neurosurgery in 6 weeks with repeat xrays      Discussed with Dr. Marcano\".     Patient due for XR and follow-up appointment with our team, per chart review patient was scheduled for this follow-up appt today, but it was cancelled.     Returned call to patients assisted living to discuss, they report patients daughter cancelled the appt today without approval from assisted living.     Reviewed we will need updated XR and follow-up appt prior to providing new bracing recommendations. Talib verbalized understanding and recommends we contact patients daughter for rescheduling.     Routed to scheduling team.   "

## 2024-04-09 NOTE — TELEPHONE ENCOUNTER
[facility] Call Center    Phone Message    May a detailed message be left on voicemail: yes     Reason for Call: Other: Talib calling from [facility] wanting to know if they can take off the [place] J Collar.  Please call her back to advise.      Action Taken: Message routed to:  Other: [facility]     Travel Screening: Not Applicable

## 2024-04-11 NOTE — TELEPHONE ENCOUNTER
Spoke with [facility] and faxed a copy of the C2C form for family to complete and send back so that we can communicate with family for scheduling.

## 2024-04-17 PROBLEM — S72.92XA FEMUR FRACTURE, LEFT (H): Status: ACTIVE | Noted: 2024-01-01

## 2024-04-17 PROBLEM — J96.01 ACUTE RESPIRATORY FAILURE WITH HYPOXIA (H): Status: ACTIVE | Noted: 2020-05-11

## 2024-04-17 PROBLEM — J44.9 CHRONIC OBSTRUCTIVE LUNG DISEASE (H): Status: ACTIVE | Noted: 2024-01-01

## 2024-04-17 PROBLEM — S72.145K: Status: ACTIVE | Noted: 2024-01-01

## 2024-04-17 PROBLEM — C61 MALIGNANT TUMOR OF PROSTATE (H): Status: ACTIVE | Noted: 2024-01-01

## 2024-04-17 PROBLEM — I50.9 ACUTE EXACERBATION OF CHF (CONGESTIVE HEART FAILURE) (H): Status: ACTIVE | Noted: 2024-01-01

## 2024-04-17 PROBLEM — S72.355D: Status: ACTIVE | Noted: 2024-01-01

## 2024-04-17 NOTE — ED NOTES
[facility]  ED Nurse Handoff Report    ED Chief complaint: Shortness of Breath and Leg Swelling      ED Diagnosis:   Final diagnoses:   None       Code Status: DNR / DNI    Allergies:   Allergies   Allergen Reactions    No Known Drug Allergy        Patient Story: Pt BIBA from assisted living for report of shortness of breath and leg swelling. Pt reports pain to left upper leg upon arrival to ED. Noted to have had a fall earlier today at assisted living.    Focused Assessment:  Pt is alert and oriented, occasionally forgetful, pleasant and cooperative. See lab and xray results below.    Treatments and/or interventions provided: Labs, Xray, medications, monitoring  Labs Ordered and Resulted from Time of ED Arrival to Time of ED Departure   BASIC METABOLIC PANEL - Abnormal       Result Value    Sodium 135      Potassium 4.8      Chloride 98      Carbon Dioxide (CO2) 22      Anion Gap 15      Urea Nitrogen 27.1 (*)     Creatinine 1.35 (*)     GFR Estimate 51 (*)     Calcium 8.7 (*)     Glucose 253 (*)    CBC WITH PLATELETS AND DIFFERENTIAL - Abnormal    WBC Count 20.8 (*)     RBC Count 2.90 (*)     Hemoglobin 8.2 (*)     Hematocrit 26.1 (*)     MCV 90      MCH 28.3      MCHC 31.4 (*)     RDW 17.5 (*)     Platelet Count 312      % Neutrophils 89      % Lymphocytes 1      % Monocytes 8      % Eosinophils 1      % Basophils 0      % Immature Granulocytes 1      NRBCs per 100 WBC 0      Absolute Neutrophils 18.4 (*)     Absolute Lymphocytes 0.2 (*)     Absolute Monocytes 1.7 (*)     Absolute Eosinophils 0.2      Absolute Basophils 0.1      Absolute Immature Granulocytes 0.2      Absolute NRBCs 0.0     PROCALCITONIN - Abnormal    Procalcitonin 1.06 (*)    NT PROBNP INPATIENT - Abnormal    N terminal Pro BNP Inpatient 5,202 (*)    TROPONIN T, HIGH SENSITIVITY - Abnormal    Troponin T, High Sensitivity 39 (*)    LACTIC ACID WHOLE BLOOD - Normal    Lactic Acid 1.9     INFLUENZA A/B, RSV, & SARS-COV2 PCR    ROUTINE UA WITH MICROSCOPIC REFLEX TO CULTURE   TROPONIN T, HIGH SENSITIVITY     XR Pelvis 1/2 Views   Final Result   IMPRESSION: Acute appearing highly comminuted and displaced intertrochanteric fracture of the proximal left femur. Status post intramedullary sunil and screw fixation; limited evaluation due to positioning. The distal screw head sits 8 mm proud from the    cortical surface. There is normal joint alignment. Osteopenia. Vascular calcification.       Chest XR,  PA & LAT   Final Result   IMPRESSION: Patient's rotated to the left. Cardiomegaly and slight pulmonary vascular congestion is unchanged. Haziness to the left chest with blunting of the left costophrenic angle is unchanged and reflects posttraumatic changes of the pleura. Old    right lateral seventh rib fracture as well.      XR Femur Left 2 Views   Final Result   IMPRESSION: Acute appearing highly comminuted and displaced intertrochanteric fracture of the proximal left femur. Status post intramedullary sunil and screw fixation; limited evaluation due to positioning. The distal screw head sits 8 mm proud from the    cortical surface. There is normal joint alignment. Osteopenia. Vascular calcification.           Patient's response to treatments and/or interventions: Tolerating interventions well. Can be cantankerous/grumpy.     To be done/followed up on inpatient unit:  continue plan of care, Needs urine sample    Does this patient have any cognitive concerns?: Forgetful    Activity level - Baseline/Home:  Independent and Walker  Activity Level - Current:   Unknown - Bedrest    Patient's Preferred language: English   Needed?: No  Isolation: none  Infection: none  Patient tested for COVID 19 prior to admission: YES  Bariatric?: No    Vital Signs:   Vitals:    04/17/24 1700 04/17/24 1715 04/17/24 1730 04/17/24 1830   BP: (!) 149/80 138/75 (!) 154/90 (!) 142/71   Pulse: 76 70 88 76   Resp:       Temp:       TempSrc:       SpO2: 96% 97%  94%    Weight:       Height:           Cardiac Rhythm:     Was the PSS-3 completed:   Yes  What interventions are required if any?               Family Comments: Not present, avail by phone  OBS brochure/video discussed/provided to patient/family: No              Name of person given brochure if not patient: n/a              Relationship to patient: n/a    For the majority of the shift this patient's behavior was Green.   Behavioral interventions performed were rounding.    ED NURSE PHONE NUMBER: *[phone]

## 2024-04-17 NOTE — ED TRIAGE NOTES
Pt BIBA from assisted living for increased shortness of breath. Pt seen at [facility] yesterday and lasix was increased. Pt reports productive cough and increased swelling to BLEs. Pt also reports left upper leg pain that is new since a fall earlier today.      Triage Assessment (Adult)       Row Name 04/17/24 1537          Triage Assessment    Airway WDL WDL        Respiratory WDL    Respiratory WDL X;cough  shortness of breath and productive cough        Skin Circulation/Temperature WDL    Skin Circulation/Temperature WDL X;all     Skin Circulation pallor        Cardiac WDL    Cardiac WDL WDL        Peripheral/Neurovascular WDL    Peripheral Neurovascular WDL WDL        Cognitive/Neuro/Behavioral WDL    Cognitive/Neuro/Behavioral WDL WDL

## 2024-04-17 NOTE — ED NOTES
Bed: ED30  Expected date:   Expected time:   Means of arrival:   Comments:  851   84 m sob/3 L NC  1513

## 2024-04-18 NOTE — PRE-PROCEDURE
GENERAL PRE-PROCEDURE:   Procedure:  Right heart cath  Date/Time:  4/18/2024 4:09 PM    Written consent obtained?: Yes    Risks and benefits: Risks, benefits and alternatives were discussed    Consent given by:  Patient  Patient states understanding of procedure being performed: Yes    Patient's understanding of procedure matches consent: Yes    Procedure consent matches procedure scheduled: Yes    Expected level of sedation:  Moderate  Appropriately NPO:  Yes  ASA Class:  4  Mallampati  :  Grade 1- soft palate, uvula, tonsillar pillars, and posterior pharyngeal wall visible  Lungs:  Lungs clear with good breath sounds bilaterally  Heart:  RRR  History & Physical reviewed:  History and physical reviewed and no updates needed  Statement of review:  I have reviewed the lab findings, diagnostic data, medications, and the plan for sedation

## 2024-04-18 NOTE — PLAN OF CARE
Orthopedic Plan of Care    Patient is not medically optimized for a left hip surgery for a periprosthetic femur fracture today as he requires additional cardiology work-up. Right heart catheterization is planned for 4/18/24. Discussed with Dr. Pulido, who states that he does not have OR availability until late Tuesday (4/23/24) and the patient needs a joint revision specialist such as himself to complete the procedure. Will tentatively plan for left hip surgery on 4/23/24 pending the patient is medically optimized at that time. Alternative would be for patient to transition to higher level of care for surgery in the interim, but this would be deferred to medicine team to coordinate. Okay for anticoagulants in the meantime per cardiology, heparin or Lovenox is preferred due to more reliable stop time parameters.     Please contact ortho team with any questions or concerns.    Addendum:  Left knee radiographs dated 4/18/24:  IMPRESSION: Extensive arterial calcifications. Normal bones, joint spaces and alignment. No fracture, effusion or calcified intra-articular body.    Left lower extremity venous Doppler US dated 4/18/24:  IMPRESSION: No evidence of deep venous thrombosis.  Complex fluid collection in the left groin. This most likely represents a hematoma.    Dipika Gutierrez PA-C  [facility]

## 2024-04-18 NOTE — CONSULTS
Neurosurgery Consult    HPI    Mr. Garcia is a 84-year-old male who was admitted on 4/17/2024 for transition of care needed for shortness of breath and leg swelling, found to have a left hip fracture.    Patient was planned for hip surgery today, however requires additional cardiac workup will be undergoing cardiac catheterization.    He was previously seen by neurosurgery in February for C6-T1 subtle superior plate compression fractures, recommended to have a cervical collar for that.    In December he was seen for intracranial hemorrhage.    His intracranial hemorrhage is since resolved    Today he denies any neck pain, states he stopped wearing his neck brace but does not have any pain.    He declines any additional imaging for his neck.      Medical history    CKD, A-fib on AC, DM type I, diabetic nephropathy, HTN, pulmonary HTN, recent intracranial hemorrhage in 12/2023, recent L hip fracture, s/p IM nailing on 3/23/24          B/P: 141/79, T: 97.8, P: 80, R: 20       Exam    Alert and oriented no acute distress  Moving bilateral upper extremities with appropriate strength, the right lower extremity with appropriate strength, able to wiggle toes in the left leg, minimal movement due to pain from hip fracture in the left leg.    Finger-nose slow and accurate  Negative pronator drift  Extraocular movements intact  Pupils equal and reactive    Cervical spine nontender to palpation        Imaging    Head CT scan negative for intracranial hemorrhage, mass or acute infarct.    Assessment    Resolved intracranial hemorrhage    History of subtle superior endplate subacute compression fractures of T6 and T1 6 weeks ago.  Patient declines further imaging, denies any neck pain    Plan:      Patient may be treated with anticoagulation at the discretion of the medical team given that his head CT scan has been negative for intracranial hemorrhage since 1/28/2024.    With regard to his C6 and T1 previously noted compression  fractures, patient not having any neck pain, and declines any further imaging.  Therefore further intervention is not required unless he is developing new neck pain, or would like to complete his follow-up imaging.    Neurosurgery will sign off.

## 2024-04-18 NOTE — CONSULTS
[facility]    Cardiology Consultation     Date of Admission:  4/17/2024    Assessment & Plan   Tc Garcia is a 84 year old male who was admitted on 4/17/2024 acute, closed left femur fracture following mechanical fall.    Preoperative cardiovascular evaluation  Left hip fracture due to mechanical fall  Chronic heart failure with preserved ejection fraction  Severe pulm hypertension  Paroxysmal A-fib, off anticoagulation due prior intracranial hemorrhage being worked up for Watchman  Hypertension  Chronic kidney disease  Dementia    -- His echocardiogram demonstrated worsening right-sided filling pressures and severe pulm hypertension.  Recommend right heart catheterization today to assess PA and filling pressures.  He will need heart failure optimization prior to orthopedic surgery.    -- Continue current cardiac medications including aspirin and beta-blocker    Ej Oliva MD, [facility]    High complexity       80 MINUTES SPENT BY ME on the date of service doing chart review, history, exam, documentation & further activities per the note.       Primary Care Physician   Physician No Ref-Primary    Reason for Consult   Reason for consult: Preoperative cardiovascular valuation, chronic heart failure, pulm hypertension    History of Present Illness   Tc Garcia is a 84 year old male with complex cardiovascular history including chronic heart failure preserved ejection fraction pulmonary hypertension, diabetes, hypertension, hyperlipidemia, chronic kidney disease and frequent falls who is hospitalized with recurrent fall resulting left femur fracture.  He is on schedule to undergo surgery and we are asked to help optimization of his cardiac status prior to surgery.    He is well-known to our [facility] and was seen few months ago for follow-up.  His weight and heart failure symptoms have been stable since his last follow-up.  He is on low-dose furosemide.  He had repeat echocardiogram yesterday  which demonstrated hyperdynamic function with an EF greater than 70%, flattened septum consistent with volume/pressure overload and severe pulmonary hypertension.  He denies chest pain or other cardiac symptoms    Past Medical History   Past Medical History:   Diagnosis Date    Anemia     Anemia of chronic disease 5/14/2020    Back pain     CKD (chronic kidney disease) stage 3, GFR 30-59 ml/min (H)     CRF (chronic renal failure), stage 3  5/14/2020    Fall 11/2019    suffered multiple left rib fractures, C3 and T2 fractures    Mixed hyperlipidemia 7/7/2004    Paroxysmal atrial fibrillation (H)     Personal history of colonic polyps 1972    gets colonoscopy every five years, due in 2006    Pleural effusion on left 11/2019    after multiple rib fractures    Pulmonary hypertension (H) 5/10/2020    Added automatically from request for surgery [id number]    Recurrent Left Pleural effusion -- S/P Pleurex Cath 5/12/20 12/30/2019    Rosacea 1989    Type II or unspecified type diabetes mellitus without mention of complication, not stated as uncontrolled 1999    Unspecified essential hypertension 1994       Past Surgical History   Past Surgical History:   Procedure Laterality Date    ANESTHESIA CARDIOVERSION N/A 2/3/2020    Procedure: ANESTHESIA, FOR CARDIOVERSION;  Surgeon: GENERIC ANESTHESIA PROVIDER;  Location:  OR     RIGHT HEART CATH MEASUREMENTS RECORDED N/A 5/13/2020    Procedure: Right Heart Cath;  Surgeon: Senthil Silva MD;  Location:  HEART CARDIAC CATH LAB    HC REMOVE TONSILS/ADENOIDS,<13 Y/O      T & A <11y.o.    IR CHEST TUBE DRAIN TUNNELED LEFT  5/12/2020    IR CHEST TUBE PLACEMENT NON-TUNNELLED LEFT  7/11/2020    IR CHEST TUBE REMOVAL NON TUNNELED LEFT  7/17/2020    IR CHEST TUBE REMOVAL TUNNELED LEFT  7/11/2020    LAPAROSCOPIC CHOLECYSTECTOMY N/A 11/22/2020    Procedure: CHOLECYSTECTOMY, LAPAROSCOPIC;  Surgeon: Annette Lambert MD;  Location:  OR    LAPAROSCOPIC HERNIORRHAPHY INGUINAL  BILATERAL Bilateral 10/27/2020    Procedure: LAPAROSCOPIC BILATERAL INGUINAL HERNIA REPAIR WITH MESH;  Surgeon: Brian Garsia MD;  Location: [facility] OR         Prior to Admission Medications   Prior to Admission Medications   Prescriptions Last Dose Informant Patient Reported? Taking?   Alcohol Swabs PADS  Nursing Home No No   Sig: Use to swab the area of the injection or bina as directed Per insurance coverage   Continuous Blood Gluc  (DEXCOM G6 ) JOSE   Yes No   Sig: Dexcom G6    USE EACH WITH 10 DAYS SENSORS   HYDROcodone-acetaminophen (NORCO) 5-325 MG tablet PRN Nursing Home Yes Yes   Sig: Take 1 tablet by mouth every 4 hours as needed for severe pain   LACTOBACILLUS PO 4/16/2024 at 2000 Nursing Home Yes Yes   Sig: Take 1 capsule by mouth daily   Lidocaine (LIDOCARE) 4 % Patch PRN Nursing Home Yes Yes   Sig: Place 1 patch onto the skin daily as needed for moderate pain To prevent lidocaine toxicity, patient should be patch free for 12 hrs daily.   acetaminophen (TYLENOL) 500 MG tablet 4/17/2024 at 1400 Nursing Home Yes Yes   Sig: Take 1,000 mg by mouth 3 times daily - 0800, 1400, 2000   amLODIPine (NORVASC) 5 MG tablet 4/17/2024 at 0800  Yes Yes   Sig: Take 5 mg by mouth daily - 0800   aspirin 81 MG EC tablet 4/17/2024 at 0800 Nursing Home Yes Yes   Sig: Take 81 mg by mouth 2 times daily - 0800, 1730   blood glucose (NO BRAND SPECIFIED) lancets standard   No No   Sig: To use to test glucose level in the blood Use to test blood sugar  4  times daily as directed. To accompany glucose monitor brands per insurance coverage.   blood glucose (NO BRAND SPECIFIED) test strip   No No   Sig: To use to test glucose level in the blood Use to test blood sugar  4 times daily as directed. To accompany glucose monitor brands per insurance coverage.   blood glucose monitoring (NO BRAND SPECIFIED) meter device kit   No No   Sig: Use as directed Per insurance coverage   carvedilol (COREG) 6.25 MG  tablet   Yes Yes   Sig: Take 6.25 mg by mouth 2 times daily (with meals) - 0800, 1730   furosemide (LASIX) 20 MG tablet 4/17/2024 at 0800 Nursing Home Yes Yes   Sig: Take 20 mg by mouth daily   glucose (BD GLUCOSE) 4 g chewable tablet PRN Nursing Home No Yes   Sig: Take 4 tablets by mouth every 15 minutes as needed for low blood sugar   insulin glargine (LANTUS PEN) 100 UNIT/ML pen  Nursing Home Yes Yes   Sig: Inject 8 Units Subcutaneous at bedtime   insulin lispro (HUMALOG KWIKPEN) 100 UNIT/ML (1 unit dial) KWIKPEN 4/17/2024 at 1400 Nursing Home Yes Yes   Sig: Inject 4 Units Subcutaneous 3 times daily (before meals) Hold for BG less than 90   insulin pen needle (32G X 4 MM) 32G X 4 MM miscellaneous  Nursing Home No No   Sig: Use as directed by provider Per insurance coverage   isosorbide mononitrate (IMDUR) 60 MG 24 hr tablet 4/17/2024 at 0800  No Yes   Sig: Take 1 tablet (60 mg) by mouth every evening   Patient taking differently: Take 60 mg by mouth daily   mirtazapine (REMERON) 15 MG tablet 4/16/2024 at 2000 Nursing [place] No Yes   Sig: Take 1 tablet (15 mg) by mouth at bedtime   ramelteon (ROZEREM) 8 MG tablet PRN Nursing Home Yes Yes   Sig: Take 1 tablet (8 mg) by mouth nightly as needed for sleep   tamsulosin (FLOMAX) 0.4 MG capsule 4/16/2024 at 2000 Nursing [place] No Yes   Sig: Take 1 capsule (0.4 mg) by mouth every morning   Patient taking differently: Take 0.4 mg by mouth at bedtime      Facility-Administered Medications: None     Current Facility-Administered Medications   Medication Dose Route Frequency Provider Last Rate Last Admin    acetaminophen (TYLENOL) tablet 650 mg  650 mg Oral Q4H PRN Jeremi Martinez PA-C   650 mg at 04/17/24 2338    Or    acetaminophen (TYLENOL) Suppository 650 mg  650 mg Rectal Q4H PRN Jeremi Martinez PA-C        acetaminophen (TYLENOL) tablet 1,000 mg  1,000 mg Oral TID Jeremi Martinez PA-C   1,000 mg at 04/18/24 0858    amLODIPine (NORVASC) tablet 5 mg  5 mg Oral  Daily Jeremi Martinez PA-C   5 mg at 04/18/24 0858    artificial saliva (BIOTENE MT) solution 1 spray  1 spray Mouth/Throat 4x Daily Jeremi Martinez PA-C   1 spray at 04/18/24 0905    [START ON 4/19/2024] aspirin EC tablet 81 mg  81 mg Oral Daily Mayra Fonseca MD        calcium carbonate (TUMS) chewable tablet 1,000 mg  1,000 mg Oral 4x Daily PRN Jeremi Martinez PA-C        carvedilol (COREG) tablet 6.25 mg  6.25 mg Oral BID w/meals Jeremi Martinez PA-C   6.25 mg at 04/18/24 0858    cefTRIAXone (ROCEPHIN) 1 g vial to attach to  mL bag for ADULTS or NS 50 mL bag for PEDS  1 g Intravenous Q24H Jeremi Martinez PA-C        glucose gel 15-30 g  15-30 g Oral Q15 Min PRN Jeremi Martinez PA-C        Or    dextrose 50 % injection 25-50 mL  25-50 mL Intravenous Q15 Min PRN Jeremi Martinez PA-C        Or    glucagon injection 1 mg  1 mg Subcutaneous Q15 Min PRN Jeremi Martinez PA-C        [Held by provider] furosemide (LASIX) tablet 20 mg  20 mg Oral Daily Jeremi Martinez PA-C        HOLD: ALL Anticoagulant medications until AFTER surgery   Does not apply HOLD Jeremi Martinez PA-C        hydrALAZINE (APRESOLINE) tablet 10 mg  10 mg Oral Q4H PRN Jeremi Martinez PA-C        Or    hydrALAZINE (APRESOLINE) injection 10 mg  10 mg Intravenous Q4H PRN Jeremi Martinez PA-C        insulin aspart (NovoLOG) injection (RAPID ACTING)  4 Units Subcutaneous TID AC Jeremi Martinez PA-C        insulin aspart (NovoLOG) injection (RAPID ACTING)  1-10 Units Subcutaneous TID AC Jeremi Martinez PA-C   9 Units at 04/18/24 0857    insulin aspart (NovoLOG) injection (RAPID ACTING)  1-7 Units Subcutaneous At Bedtime Jeremi Martinez PA-C        insulin glargine (LANTUS PEN) injection 8 Units  8 Units Subcutaneous QAM AC Mayra Fonseca MD   8 Units at 04/18/24 1043    isosorbide mononitrate (IMDUR) 24 hr tablet 60 mg  60 mg Oral QPM Mayra Fonseca MD   60 mg at 04/18/24 1044     lidocaine (LMX4) cream   Topical Q1H PRN Jeremi Martinez PA-C        lidocaine 1 % 0.1-1 mL  0.1-1 mL Other Q1H PRN Jeremi Martinez PA-C        mirtazapine (REMERON) tablet 15 mg  15 mg Oral At Bedtime Jeremi Martinez PA-C        naloxone (NARCAN) injection 0.2 mg  0.2 mg Intravenous Q2 Min PRN Mayra Fonseca MD        Or    naloxone (NARCAN) injection 0.4 mg  0.4 mg Intravenous Q2 Min PRN Mayra Fonseca MD        Or    naloxone (NARCAN) injection 0.2 mg  0.2 mg Intramuscular Q2 Min PRN Mayra Fonseca MD        Or    naloxone (NARCAN) injection 0.4 mg  0.4 mg Intramuscular Q2 Min PRN Mayra Fonseca MD        ondansetron (ZOFRAN ODT) ODT tab 4 mg  4 mg Oral Q6H PRN Jeremi Martinez PA-C        Or    ondansetron (ZOFRAN) injection 4 mg  4 mg Intravenous Q6H PRN Jeremi Martinez PA-C        ondansetron (ZOFRAN ODT) ODT tab 4 mg  4 mg Oral Q6H PRN Jeremi Martinez PA-C        Or    ondansetron (ZOFRAN) injection 4 mg  4 mg Intravenous Q6H PRN Jeremi Martinez PA-C        oxyCODONE (ROXICODONE) tablet 5 mg  5 mg Oral Q4H PRN Jeremi Martinez PA-C   5 mg at 04/18/24 0930    oxyCODONE IR (ROXICODONE) half-tab 2.5 mg  2.5 mg Oral Q4H PRN Jeremi Martinez PA-C        polyethylene glycol (MIRALAX) Packet 17 g  17 g Oral Daily Jeremi Martinez PA-C        prochlorperazine (COMPAZINE) injection 5 mg  5 mg Intravenous Q6H PRN Jeremi Martinez PA-C        Or    prochlorperazine (COMPAZINE) tablet 5 mg  5 mg Oral Q6H PRN Jeremi Martinez PA-C        Or    prochlorperazine (COMPAZINE) suppository 12.5 mg  12.5 mg Rectal Q12H PRN Jeremi Martinez PA-C        QUEtiapine (SEROquel) tablet 25 mg  25 mg Oral BID PRN Jeremi Martinez PA-C   25 mg at 04/17/24 2338    [Held by provider] ramelteon (ROZEREM) tablet 8 mg  8 mg Oral At Bedtime PRN Jeremi Martinez PA-C        senna-docusate (SENOKOT-S/PERICOLACE) 8.6-50 MG per tablet 1 tablet  1 tablet Oral BID PRN Jeremi Martinez  KAMILLA        Or    senna-docusate (SENOKOT-S/PERICOLACE) 8.6-50 MG per tablet 2 tablet  2 tablet Oral BID PRN Jeremi Martinez PA-C        sodium chloride (PF) 0.9% PF flush 3 mL  3 mL Intracatheter Q8H Jeremi Martinez PA-C   3 mL at 04/18/24 0859    sodium chloride (PF) 0.9% PF flush 3 mL  3 mL Intracatheter q1 min prn Jeremi Martinez PA-C        tamsulosin (FLOMAX) capsule 0.4 mg  0.4 mg Oral At Bedtime Jeremi Martinez PA-C   0.4 mg at 04/17/24 2338     Current Facility-Administered Medications   Medication Dose Route Frequency Provider Last Rate Last Admin    acetaminophen (TYLENOL) tablet 650 mg  650 mg Oral Q4H PRN Jeremi Martinez PA-C   650 mg at 04/17/24 2338    Or    acetaminophen (TYLENOL) Suppository 650 mg  650 mg Rectal Q4H PRN Jeremi Martinez PA-C        acetaminophen (TYLENOL) tablet 1,000 mg  1,000 mg Oral TID Jeremi Martinez PA-C   1,000 mg at 04/18/24 0858    amLODIPine (NORVASC) tablet 5 mg  5 mg Oral Daily Jeremi Martinez PA-C   5 mg at 04/18/24 0858    artificial saliva (BIOTENE MT) solution 1 spray  1 spray Mouth/Throat 4x Daily Jeremi Martinez PA-C   1 spray at 04/18/24 0905    [START ON 4/19/2024] aspirin EC tablet 81 mg  81 mg Oral Daily Mayra Fonseca MD        calcium carbonate (TUMS) chewable tablet 1,000 mg  1,000 mg Oral 4x Daily PRN Jeremi Martinez PA-C        carvedilol (COREG) tablet 6.25 mg  6.25 mg Oral BID w/meals Jeremi Martinez PA-C   6.25 mg at 04/18/24 0858    cefTRIAXone (ROCEPHIN) 1 g vial to attach to  mL bag for ADULTS or NS 50 mL bag for PEDS  1 g Intravenous Q24H Jeremi Martinez PA-C        glucose gel 15-30 g  15-30 g Oral Q15 Min PRN Jeremi Martinez PA-C        Or    dextrose 50 % injection 25-50 mL  25-50 mL Intravenous Q15 Min PRN Jeremi Martinez PA-C        Or    glucagon injection 1 mg  1 mg Subcutaneous Q15 Min PRN Jeremi Martinez PA-C        [Held by provider] furosemide (LASIX) tablet 20 mg  20 mg  Oral Daily Jeremi Martinez PA-C        HOLD: ALL Anticoagulant medications until AFTER surgery   Does not apply HOLD Jeremi Martinez PA-C        hydrALAZINE (APRESOLINE) tablet 10 mg  10 mg Oral Q4H PRN Jeremi Martinez PA-C        Or    hydrALAZINE (APRESOLINE) injection 10 mg  10 mg Intravenous Q4H PRN Jeremi Martinez PA-C        insulin aspart (NovoLOG) injection (RAPID ACTING)  4 Units Subcutaneous TID AC Jeremi Martinez PA-C        insulin aspart (NovoLOG) injection (RAPID ACTING)  1-10 Units Subcutaneous TID AC Jeremi Martinez PA-C   9 Units at 04/18/24 0857    insulin aspart (NovoLOG) injection (RAPID ACTING)  1-7 Units Subcutaneous At Bedtime Jeremi Martinez PA-C        insulin glargine (LANTUS PEN) injection 8 Units  8 Units Subcutaneous QAM  Mayra Fonseca MD   8 Units at 04/18/24 1043    isosorbide mononitrate (IMDUR) 24 hr tablet 60 mg  60 mg Oral QPM Mayra Fonseca MD   60 mg at 04/18/24 1045    lidocaine (LMX4) cream   Topical Q1H PRN Jeremi Martinez PA-C        lidocaine 1 % 0.1-1 mL  0.1-1 mL Other Q1H PRN Jeremi Martinez PA-C        mirtazapine (REMERON) tablet 15 mg  15 mg Oral At Bedtime Jeremi Martinez PA-C        naloxone (NARCAN) injection 0.2 mg  0.2 mg Intravenous Q2 Min PRN Mayra Fonseca MD        Or    naloxone (NARCAN) injection 0.4 mg  0.4 mg Intravenous Q2 Min PRN Mayra Fonseca MD        Or    naloxone (NARCAN) injection 0.2 mg  0.2 mg Intramuscular Q2 Min PRN Mayra Fonseca MD        Or    naloxone (NARCAN) injection 0.4 mg  0.4 mg Intramuscular Q2 Min PRN Mayra Fonseca MD        ondansetron (ZOFRAN ODT) ODT tab 4 mg  4 mg Oral Q6H PRN Jeremi Martinez PA-C        Or    ondansetron (ZOFRAN) injection 4 mg  4 mg Intravenous Q6H PRN Jeremi Martinez PA-C        ondansetron (ZOFRAN ODT) ODT tab 4 mg  4 mg Oral Q6H PRN Jeremi Martinez PA-C        Or    ondansetron (ZOFRAN) injection 4 mg  4 mg Intravenous Q6H PRN Michelle,  Jeremi MCKENZIE PA-C        oxyCODONE (ROXICODONE) tablet 5 mg  5 mg Oral Q4H PRN Jeremi Martinez PA-C   5 mg at 04/18/24 0930    oxyCODONE IR (ROXICODONE) half-tab 2.5 mg  2.5 mg Oral Q4H PRN Jeremi Martinez PA-C        polyethylene glycol (MIRALAX) Packet 17 g  17 g Oral Daily Jeremi Martinez PA-C        prochlorperazine (COMPAZINE) injection 5 mg  5 mg Intravenous Q6H PRN Jeremi Martinez PA-C        Or    prochlorperazine (COMPAZINE) tablet 5 mg  5 mg Oral Q6H PRN Jeremi Martinez PA-C        Or    prochlorperazine (COMPAZINE) suppository 12.5 mg  12.5 mg Rectal Q12H PRN Jeremi Martinez PA-C        QUEtiapine (SEROquel) tablet 25 mg  25 mg Oral BID PRN Jeremi Martinez PA-C   25 mg at 04/17/24 2338    [Held by provider] ramelteon (ROZEREM) tablet 8 mg  8 mg Oral At Bedtime PRN Jeremi Martinez PA-C        senna-docusate (SENOKOT-S/PERICOLACE) 8.6-50 MG per tablet 1 tablet  1 tablet Oral BID PRN Jeremi Martinez PA-C        Or    senna-docusate (SENOKOT-S/PERICOLACE) 8.6-50 MG per tablet 2 tablet  2 tablet Oral BID PRN Jeremi Martinez PA-C        sodium chloride (PF) 0.9% PF flush 3 mL  3 mL Intracatheter Q8H Jeremi Martinez PA-C   3 mL at 04/18/24 0859    sodium chloride (PF) 0.9% PF flush 3 mL  3 mL Intracatheter q1 min prn Jeremi Martinez PA-C        tamsulosin (FLOMAX) capsule 0.4 mg  0.4 mg Oral At Bedtime Jeremi Martinez PA-C   0.4 mg at 04/17/24 2338     Allergies   Allergies   Allergen Reactions    No Known Drug Allergy        Social History    reports that he quit smoking about 16 years ago. His smoking use included cigarettes. He started smoking about 56 years ago. He has a 8 pack-year smoking history. He has never used smokeless tobacco. He reports that he does not currently use alcohol after a past usage of about 35.0 standard drinks of alcohol per week. He reports that he does not currently use drugs.      Family History   I have reviewed this patient's family  "history and updated it with pertinent information if needed.  Family History   Problem Relation Age of Onset    Family History Negative Mother          age 71    Family History Negative Father          age 70    Diabetes Brother         alive age 77    Diabetes Brother         alive age 76    Family History Negative Brother             Family History Negative Brother             Diabetes Sister         alive age76    Family History Negative Sister          age 86    Heart Disease Sister             Family History Negative Sister             Family History Negative Sister             Diabetes Sister     Diabetes Sister     Diabetes Brother     Diabetes Brother           Review of Systems   A comprehensive review of system was performed and is negative other than that noted in the HPI or here.     Physical Exam   Vital Signs with Ranges  Temp:  [97.6  F (36.4  C)-98.4  F (36.9  C)] 97.8  F (36.6  C)  Pulse:  [70-88] 80  Resp:  [18-24] 20  BP: (122-161)/(68-94) 141/79  SpO2:  [90 %-98 %] 98 %  Wt Readings from Last 4 Encounters:   24 63.7 kg (140 lb 6.9 oz)   24 68 kg (150 lb)   24 65.3 kg (144 lb)   24 66 kg (145 lb 9.6 oz)     I/O last 3 completed shifts:  In: 100 [IV Piggyback:100]  Out: 700 [Urine:700]      Vitals: BP (!) 141/79 (BP Location: Right arm)   Pulse 80   Temp 97.8  F (36.6  C) (Oral)   Resp 20   Ht 1.753 m (5' 9\")   Wt 63.7 kg (140 lb 6.9 oz)   SpO2 98%   BMI 20.74 kg/m      Physical Exam:   General - Alert and oriented to time place and person in no acute distress  Eyes - No scleral icterus  HEENT - Neck supple, moist mucous membranes  Cardiovascular -regular rate  Extremities - There is 1 + edema  Respiratory -diminished at the bases  Skin - No pallor or cyanosis  Gastrointestinal - Non tender and non distended without rebound or guarding  Psych - Appropriate affect   Neurological - No gross motor " "neurological focal deficits    No lab results found in last 7 days.    Invalid input(s): \"TROPONINIES\"    Recent Labs   Lab 04/18/24  1043 04/18/24  0812 04/18/24  0733 04/18/24  0106 04/17/24  1544   WBC  --   --  13.5*  --  20.8*   HGB  --   --  7.7*  --  8.2*   MCV  --   --  90  --  90   PLT  --   --  286  --  312   INR  --   --  1.25*  --   --    NA  --   --  135  --  135   POTASSIUM  --   --  4.7  --  4.8   CHLORIDE  --   --  98  --  98   CO2  --   --  25  --  22   BUN  --   --  29.2*  --  27.1*   CR  --   --  1.43*  --  1.35*   GFRESTIMATED  --   --  48*  --  51*   ANIONGAP  --   --  12  --  15   LLOYD  --   --  8.6*  --  8.7*   * 382* 389*   < > 253*    < > = values in this interval not displayed.     Recent Labs   Lab Test 12/12/23  0543 01/17/23  0436   CHOL 137 112   HDL 68 50   LDL 54 52   TRIG 76 51     Recent Labs   Lab 04/18/24  0733 04/17/24  1544   WBC 13.5* 20.8*   HGB 7.7* 8.2*   HCT 25.4* 26.1*   MCV 90 90    312   IRON 14*  --    IRONSAT 6*  --    *  --      No results for input(s): \"PH\", \"PHV\", \"PO2\", \"PO2V\", \"SAT\", \"PCO2\", \"PCO2V\", \"HCO3\", \"HCO3V\" in the last 168 hours.  Recent Labs   Lab 04/17/24  1544   NTBNPI 5,202*     No results for input(s): \"DD\" in the last 168 hours.  No results for input(s): \"SED\", \"CRP\" in the last 168 hours.  Recent Labs   Lab 04/18/24  0733 04/17/24  1544    312     No results for input(s): \"TSH\" in the last 168 hours.  Recent Labs   Lab 04/18/24  0212   COLOR Straw   APPEARANCE Clear   URINEGLC 200*   URINEBILI Negative   URINEKETONE Negative   SG 1.006   UBLD Negative   URINEPH 7.0   PROTEIN Negative   NITRITE Negative   LEUKEST Negative   RBCU <1   WBCU <1       Imaging:  Recent Results (from the past 48 hour(s))   XR Femur Left 2 Views    Narrative    EXAM: XR PELVIS 1/2 VIEWS, XR FEMUR LEFT 2 VIEWS  LOCATION: [facility]  DATE: 4/17/2024    INDICATION: Left hip pain uncertain if new or not  COMPARISON: None.   "    Impression    IMPRESSION: Acute appearing highly comminuted and displaced intertrochanteric fracture of the proximal left femur. Status post intramedullary sunil and screw fixation; limited evaluation due to positioning. The distal screw head sits 8 mm proud from the   cortical surface. There is normal joint alignment. Osteopenia. Vascular calcification.    Chest XR,  PA & LAT    Narrative    EXAM: XR CHEST 2 VIEWS  LOCATION: [facility]  DATE: 4/17/2024    INDICATION: Cough, crackles on left  COMPARISON: CT chest 02/25/2024, chest x-ray 01/23/2024      Impression    IMPRESSION: Patient's rotated to the left. Cardiomegaly and slight pulmonary vascular congestion is unchanged. Haziness to the left chest with blunting of the left costophrenic angle is unchanged and reflects posttraumatic changes of the pleura. Old   right lateral seventh rib fracture as well.   XR Pelvis 1/2 Views    Narrative    EXAM: XR PELVIS 1/2 VIEWS, XR FEMUR LEFT 2 VIEWS  LOCATION: [facility]  DATE: 4/17/2024    INDICATION: Left hip pain uncertain if new or not  COMPARISON: None.      Impression    IMPRESSION: Acute appearing highly comminuted and displaced intertrochanteric fracture of the proximal left femur. Status post intramedullary sunil and screw fixation; limited evaluation due to positioning. The distal screw head sits 8 mm proud from the   cortical surface. There is normal joint alignment. Osteopenia. Vascular calcification.    Head CT w/o contrast    Narrative    EXAM: CT HEAD W/O CONTRAST  LOCATION: [facility]  DATE: 4/17/2024    INDICATION: More confused, fall earlier today.  COMPARISON: CT brain 10 02/25/2024.  TECHNIQUE: Routine CT Head without IV contrast. Multiplanar reformats. Dose reduction techniques were used.    FINDINGS:  INTRACRANIAL CONTENTS: No finding for intracranial hemorrhage, mass, or acute infarct. There is moderate enlargement of the  lateral ventricles with more mild to moderate prominence of the third ventricle and sulci. Gray-white matter differentiation is   preserved. Small chronic appearing lacunar infarct right caudate head. Mild presumed sequela chronic microvascular ischemic change. No transcortical areas of low attenuation change. No mass effect or midline shift.    Cerebellar tonsils are normally positioned. Sella is unremarkable for technique. Thinning of the body of the corpus callosum which otherwise appears normally formed.    VISUALIZED ORBITS/SINUSES/MASTOIDS: Prior cataract surgeries. Bilateral scleral calcifications. Visualized paranasal sinuses, middle ear cavities, and mastoid air cells are clear. Incidental note is made of pneumatization of the petrous apices   bilaterally.     BONES/SOFT TISSUES: Calvarium is intact, without fracture or suspicious lytic or blastic foci. No scalp hematoma.      Impression    IMPRESSION:  1.  No finding for intracranial hemorrhage, mass, or acute infarct.    2.  Moderate to advanced volume loss and at least mild presumed sequela chronic microvascular ischemic change. Small chronic lacunar infarct again seen within the right caudate.   Echocardiogram Complete   Result Value    LVEF  >70%    [facility]    [id number]  [id number]  [id number]  [id number]^AFRICA^ATIYA^JONAH     [facility]  Echocardiography Laboratory  [address] [place] [address]     Name: CEDRICK PHILLIPS  MRN: [id number]  : 1940  Study Date: 2024 08:26 AM  Age: 84 yrs  Gender: Male  Patient Location: [facility]  Reason For Study: Heart Failure, Unspecified  Ordering Physician: ATIYA LEGER  Performed By: Lokesh Smallwood     BSA: 1.8 m2  Height: 69 in  Weight: 140 lb  HR: 79  BP: 130/75 mmHg  ______________________________________________________________________________  Procedure  Complete Portable Echo  Adult.  ______________________________________________________________________________  Interpretation Summary     There is mild to moderate concentric left ventricular hypertrophy.  The visual ejection fraction is >70%.  Flattened septum is consistent with RV pressure/volume overload.  Mildly decreased right ventricular systolic function  There is severe biatrial enlargement.  There is moderate to mod-severe (2-3+) tricuspid regurgitation.  Dilation of the inferior vena cava is present with abnormal respiratory  variation in diameter.  Severe (>55mmHg) pulmonary hypertension is present.  The rhythm was atrial fibrillation.  ______________________________________________________________________________  Left Ventricle  The left ventricle is normal in size. There is mild to moderate concentric  left ventricular hypertrophy. The visual ejection fraction is >70%. Grade III  or advanced diastolic dysfunction. Flattened septum is consistent with RV  pressure/volume overload.     Right Ventricle  The right ventricle is mildly dilated. Mildly decreased right ventricular  systolic function.     Atria  There is severe biatrial enlargement.     Mitral Valve  There is mild to moderate mitral annular calcification. The mitral valve  leaflets are mildly thickened.     Tricuspid Valve  Normal tricuspid valve. Severe (>55mmHg) pulmonary hypertension is present.  There is moderate to mod-severe (2-3+) tricuspid regurgitation.     Aortic Valve  There is moderate trileaflet aortic sclerosis.     Pulmonic Valve  The pulmonic valve is not well seen, but is grossly normal. There is trace to  mild pulmonic valvular regurgitation.     Vessels  The aortic root is normal size. Normal size ascending aorta. Dilation of the  inferior vena cava is present with abnormal respiratory variation in diameter.     Pericardium  There is no pericardial effusion.     Rhythm  The rhythm was atrial  fibrillation.  ______________________________________________________________________________  MMode/2D Measurements & Calculations     IVSd: 1.3 cm  LVIDd: 3.9 cm  LVIDs: 2.4 cm  LVPWd: 1.6 cm  FS: 37.5 %  LV mass(C)d: 204.5 grams  LV mass(C)dI: 115.2 grams/m2  Ao root diam: 3.4 cm  LA dimension: 5.0 cm  asc Aorta Diam: 3.7 cm  LA/Ao: 1.5  LVOT diam: 2.0 cm  LVOT area: 3.0 cm2  Ao root diam index Ht(cm/m): 1.9  Ao root diam index BSA (cm/m2): 1.9  Asc Ao diam index BSA (cm/m2): 2.1  Asc Ao diam index Ht(cm/m): 2.1  LA Volume (BP): 153.0 ml     LA Volume Index (BP): 86.0 ml/m2  RWT: 0.81  TAPSE: 1.5 cm     Doppler Measurements & Calculations  MV E max williams: 151.0 cm/sec  MV A max williams: 54.5 cm/sec  MV E/A: 2.8  MV max P.2 mmHg  MV mean PG: 3.7 mmHg  MV V2 VTI: 34.0 cm  MV dec slope: 1540 cm/sec2  MV dec time: 0.10 sec  PA acc time: 0.07 sec  TR max williams: 476.6 cm/sec  TR max P.9 mmHg  E/E' av.8  Lateral E/e': 15.4  Medial E/e': 20.1  RV S Williams: 9.2 cm/sec     ______________________________________________________________________________  Report approved by: Momo Montes 2024 10:17 AM         US Lower Extremity Venous Duplex Left    Narrative    VENOUS ULTRASOUND LEFT LEG  2024 10:17 AM     HISTORY: Left calf swelling and pain with ecchymosis, rule out DVT    COMPARISON: None.    FINDINGS:  Examination of the deep veins with graded compression and  color flow Doppler with spectral wave form analysis was performed.   There is no evidence for DVT in the visualized veins of the left lower  extremity. The left common femoral vein is partially obscured by an  overlying bandage.    There is a fluid collection deep to the vessels in the left groin  extending to the proximal thigh. This measures 8.1 x 3.7 x 2.3 cm.  There is diffuse edema in the left calf.      Impression    IMPRESSION: No evidence of deep venous thrombosis.  Complex fluid  collection in the left groin. This most likely represents a  hematoma.    FRANCY MEDRANO MD         SYSTEM ID:  [id number]       Echo:  No results found for this or any previous visit (from the past 4320 hour(s)).    Clinically Significant Risk Factors Present on Admission               # Coagulation Defect: INR = 1.25 (Ref range: 0.85 - 1.15) and/or PTT = N/A, will monitor for bleeding  # Drug Induced Platelet Defect: home medication list includes an antiplatelet medication   # Hypertension: Noted on problem list  # Acute heart failure with preserved ejection fraction: heart failure noted on problem list, last echo with EF >50%, and receiving IV diuretics    # DMII: A1C = 9.0 % (Ref range: <5.7 %) within past 6 months       # Financial/Environmental Concerns:

## 2024-04-18 NOTE — PROGRESS NOTES
[facility]    Hospitalist Progress Note    Date of Service (when I saw the patient): 04/18/2024  Admit date: 4/17/2024    Interval History   Full details of events over last 24 hours outlined below.   He has been stable, calm, pain control.   He knows he is at the hospital for another hip fracture.  He does not recall the fall.  Family states this is very possible.  Per family he was being sent to the hospital for report of possible vomiting of blood.  This has not been witnessed since.  Hemoglobin is stable.    Currently he has no shortness of breath or chest pain.  Oxygenation 98% on 2 L hemodynamically stable afebrile.    Assessment & Plan   Tc Garcia is a 84 year old male , with a PMH of HFpEF, CKD, A-fib on AC, DM type I, diabetic nephropathy, HTN, pulmonary HTN, recent intracranial hemorrhage in 12/2023, recent L hip fracture, s/p IM nailing on 3/23/24 who was admitted on 4/17/2024 from TCU w SOB and leg pain and swelling after an unwitnessed fall.     At presentation Afeb, P 86, /68 O2 90% on RA.   Na 135, K 4.7, Cr 1.43 (baseline ~ 1.4) , glucose 253, BNP 5202. Procal 1.06  Troponin 39, 38.   WBC 20.8, hgb 8.2 (baseline ~ 9), plts 312  UA clear.   Covid/flu/RSV negative.     CXR personally reviewed, rotated to left there is pulmonary vascular congestion, seen previously. Left lower lung shows haziness and blunting of the costophrenic angle which is unchanged from prior films, likely chronic post-op changes.      Fall, with left hip fracture  S/p L femur fx 3/23 repair  S/p left hip IM nail 3/23/24 at [place] Presents from TCU w reported fall, SOB/, leg pain. XR w new left hip fracture,.  Reportedly fell today though he is adamantly denying that.  (patient confused).  Admit inpatient  Orthopedics aware  NPO currently.   Cards consulted preop as below > R heart cath today with fluid optimization and anticipate ortho surgery tomorrow.   On ASA BID PTA for DVT ppx > changed to  daily prior to surgery, defer increase post-op for DVT ppx to ortho.   APAP TID  Oxycodone 1st PRN.     Left leg swelling  U/S : No evidence of deep venous thrombosis.  Complex fluid   collection in the left groin. This most likely represents a hematoma.      Chronic normocytic anemia  Possible vomiting blood 4/17  Per report from daughter from the TCU.  Patient denies any abdominal symptoms and is not vomiting but will watch very carefully for that  With recent hip fx.Last baseline hemoglobin 4/15 7.8 with an MCV 90.6.   On arrival he is 8.2, with shortness of breath which is more likely possibly due to volume overload vs anemia.    Recent Labs   Lab 04/18/24  0733 04/17/24  1544   HGB 7.7* 8.2*     Monitor for signs of GI bleeding  Check iron panel ferritin, replace if indicated.   Give 1 unit preop to get Hgb > 8 per ortho's recommendation.   Depending on R heart cath findings. Will give additional lasix post transfusion.     Acute respiratory failure with hypoxia   HFpEF, Suspected volume overload  Elevated troponin, likely demand ischemia  Worsening pulmonary HTN  PTA Lasix 20 (recent incr). SOB at presentation. CXR shows chronic pulmonary vascular congestion and changes in left chest.  BNP 5000, last BNP 8120 on 4/15 (being seen for chest pain in the ED, they felt he likely was volume overloaded and increased his diuretic to 20 daily.).    * S/p Lasix 20 IV in ED.      Recent Labs   Lab 04/17/24  1922 04/17/24  1544   NTBNPI  --  5,202*   CTROPT 38* 39*     EF > 70%, flattened septum consistent with volume overload.  Mildly decreased RV systolic function.  Severe biatrial enlargement.  Moderate to severe TR with dilation of the IVC and abnormal respiratory variation.  And severe pulmonary hypertension.  R heart cath ordered this AM by cardiology prior to procedure.   Do not suspect ACS, acute ischemia.   O2 as needed sats below 90%  Given 40 mg IV lasix in the ED,Defer diuresis to cardiology following R heart  cath, note pRBC will be given as well.   Continue PTA carvedilol 6.25 twice daily   Continue PTA amlodipine 5 QD   Continue PTA isosorbide 60 QD     Questionable CAP, elevated Pro-Hai  Low suspicion  * Noted to have elevated Pro-Hai and a white count of 20, though could be demargination secondary to pain/fracture.  Afebrile.  CXR favors old opacity without clear infiltrate.  But with the Pro-Hai think it is possible he has a community-acquired pneumonia.    Continued on Rocephin 1 g every 24 hours for short coarse of 5 days.     Recent Labs   Lab 04/18/24  0733 04/17/24  1544   WBC 13.5* 20.8*   HGB 7.7* 8.2*    312        A-fib on AC  Holding apixaban since bleed. Neurosurgery consulted.   PTA rate control with coreg  Given his multiple falls he is being evaluated for watchman's procedure.  Will need clearance for anticoagulation prior to this.        CKD  3b, hx nephrotic proteinuria  Last baseline creatinine 1.3 on 4/15/2024, although on chart review appears 1.6.  He is at 1.3 at this time.  Recent Labs   Lab 04/18/24  0733 04/17/24  1544   CR 1.43* 1.35*          DM type I w hyperglycemia, A1c 9 on 3/20/2024  >250 on admit, ate tonight, resume lantus   * On lantus 8 units with humolog 4 units TID.     Did not receive bedtime lantus 8 units last night, BS up in the 300s, bicarb, AG normal.   Give lantus 8 units now.   Currently NPO, but surgery may be delayed until tomorrow. When eating again, start novlog 4 units.     Recent Labs   Lab 04/18/24  0812 04/18/24  0733 04/18/24  0106 04/17/24  1544   * 389* 274* 253*             BPH  PTA flomax resumed     History [facility] 12/2023  History of completed stroke  -Updated CTH without current bleed-CT head with small chronic lacunar infarct right caudate.  Moderate to advanced volume loss could be consistent with suspected PTA dementia also  -Above DOAC, statin     Acute metabolic encephalopathy  Previous cognitive declines  Mood disorder, with  insomina  Continue PTA mirtazapine 15 HS  PRN Roserem on hold around pain medications.   Daughter notes that patient is at cognitive claims history of at times paranoia delusions.  Last slums 17/30.  No formal dementia diagnosis but in DDx.  Past stroke as above.  History of alcohol abuse possible contributes.  He is quite irritable and seems confused.  OT recheck cog eval postop  Frequent re-orientation  Maintain normal day/night, sleep wake cycles  Minimize sedating/altering medications as able  Treat separate conditions as detailed above/below  Schedule melatonin at bedtime   Quetiapine PRN for insomnia and agitation at night only    Recent C7 and T12 compression fracture with cervical canal stenosis on MRI during Februray  admission for left hip fracture.   Discharged on c-collar to be warn at all times until follow up x-rays and neurosurgical follow up   He is due for this.   Neurosurgery to see    History of Traumatic ICH in 12/2023  Remains off anticoagulation  CT head no bleed at admission.   Discussed with neurosurgery.  Appreciate their consult and input regarding whether we can consider resumption of apixaban.     Goals of care  Remains DNR/DNI.  Discussed goals with patient, with daughter and wife present.  Patient wants to continue to get restorative care.  He is not comfort cares.  He is enjoying his life.    Family agrees with his decision.     Clinically Significant Risk Factors Present on Admission               # Coagulation Defect: INR = 1.25 (Ref range: 0.85 - 1.15) and/or PTT = N/A, will monitor for bleeding  # Drug Induced Platelet Defect: home medication list includes an antiplatelet medication   # Hypertension: Noted on problem list  # Acute heart failure with preserved ejection fraction: heart failure noted on problem list, last echo with EF >50%, and receiving IV diuretics    # DMII: A1C = 9.0 % (Ref range: <5.7 %) within past 6 months       # Financial/Environmental Concerns:              Diet: Orders Placed This Encounter      NPO per Anesthesia Guidelines for Procedure/Surgery Except for: Meds     IVF: None  DVT Prophylaxis: ASA daily, per ortho  Pruett Catheter: Not present    No CPR- Do NOT Intubate  Disposition:  Anticipate discharge TBD  PT/OT pending post surgery   Communication: Discussed with cardiology, RN, wife, daughter and patient on 04/18/24    Mayra Fonseca MD    Hospitalist Service  [facility]  Securely message with the Vocera Web Console (learn more here)  Text page via [email or web address] Paging/Directory      -Data reviewed today: I reviewed all new labs and imaging results over the last 24 hours. I personally reviewed the EKG tracing showing as above  and the chest x-ray image(s) showing as above .    Physical Exam   Temp: 97.8  F (36.6  C) Temp src: Oral BP: (!) 141/79 Pulse: 80   Resp: 20 SpO2: 98 % O2 Device: Nasal cannula Oxygen Delivery: 2 LPM  Vitals:    04/17/24 1533 04/18/24 0712   Weight: 72.6 kg (160 lb) 63.7 kg (140 lb 6.9 oz)     Vital Signs with Ranges  Temp:  [97.6  F (36.4  C)-98.4  F (36.9  C)] 97.8  F (36.6  C)  Pulse:  [70-88] 80  Resp:  [18-24] 20  BP: (122-161)/(68-94) 141/79  SpO2:  [90 %-98 %] 98 %  I/O last 3 completed shifts:  In: 100 [IV Piggyback:100]  Out: 700 [Urine:700]    Today's Exam  Constitutional:  NAD,   Neuropsyche:  alert and oriented to hospital and situation, but not month or year. Knows the president, answers questions appropriately. Poor short-term memory.   Respiratory:  Breathing comfortably, decreased air exchange, no wheezes, no crackles.   Cardiovascular:  Regular rate and rhythm with low-pitched systolic murmur, trace edema on RLE 2+ edema LLE.   GI:  soft, NT/ND, BS normal  Skin/Integumen:  Scattered bruising and excoriations. No acute rash.     Medications   All medications reviewed on 04/18/24   Current Facility-Administered Medications   Medication Dose Route Frequency Provider Last Rate Last Admin     Current  Facility-Administered Medications   Medication Dose Route Frequency Provider Last Rate Last Admin    acetaminophen (TYLENOL) tablet 1,000 mg  1,000 mg Oral TID Jeremi Martinez PA-C   1,000 mg at 04/18/24 0858    amLODIPine (NORVASC) tablet 5 mg  5 mg Oral Daily Jeremi Martinez PA-C   5 mg at 04/18/24 0858    artificial saliva (BIOTENE MT) solution 1 spray  1 spray Mouth/Throat 4x Daily Jeremi Martinez PA-C   1 spray at 04/18/24 0905    [Held by provider] aspirin EC tablet 81 mg  81 mg Oral BID Jeremi Martinez PA-C        carvedilol (COREG) tablet 6.25 mg  6.25 mg Oral BID w/meals Jeremi Martinez PA-C   6.25 mg at 04/18/24 0858    cefTRIAXone (ROCEPHIN) 1 g vial to attach to  mL bag for ADULTS or NS 50 mL bag for PEDS  1 g Intravenous Q24H Jeremi Martinez PA-C        [Held by provider] furosemide (LASIX) tablet 20 mg  20 mg Oral Daily Jeremi Martinez PA-C        insulin aspart (NovoLOG) injection (RAPID ACTING)  4 Units Subcutaneous TID AC Jeremi Martinez PA-C        insulin aspart (NovoLOG) injection (RAPID ACTING)  1-10 Units Subcutaneous TID AC Jeremi Martinez PA-C   9 Units at 04/18/24 0857    insulin aspart (NovoLOG) injection (RAPID ACTING)  1-7 Units Subcutaneous At Bedtime Jeremi Martinez PA-C        insulin glargine (LANTUS PEN) injection 8 Units  8 Units Subcutaneous At Bedtime Jeremi Martinez PA-C        mirtazapine (REMERON) tablet 15 mg  15 mg Oral At Bedtime Jeremi Martinez PA-C        polyethylene glycol (MIRALAX) Packet 17 g  17 g Oral Daily Jeremi Martinez PA-C        sodium chloride (PF) 0.9% PF flush 3 mL  3 mL Intracatheter Q8H Jeremi Martinez PA-C   3 mL at 04/18/24 0859    tamsulosin (FLOMAX) capsule 0.4 mg  0.4 mg Oral At Bedtime Jeremi Martinez PA-C   0.4 mg at 04/17/24 6804     PRN Meds:   Current Facility-Administered Medications   Medication Dose Route Frequency Provider Last Rate Last Admin    acetaminophen (TYLENOL) tablet  650 mg  650 mg Oral Q4H PRN Jeremi Martinez PA-C   650 mg at 04/17/24 2338    Or    acetaminophen (TYLENOL) Suppository 650 mg  650 mg Rectal Q4H PRN Jeremi Martinez PA-C        calcium carbonate (TUMS) chewable tablet 1,000 mg  1,000 mg Oral 4x Daily PRN Jeremi Martinez PA-C        glucose gel 15-30 g  15-30 g Oral Q15 Min PRN Jeremi Martinez PA-C        Or    dextrose 50 % injection 25-50 mL  25-50 mL Intravenous Q15 Min PRN Jeremi Martinez PA-C        Or    glucagon injection 1 mg  1 mg Subcutaneous Q15 Min PRN Jeremi Martinez PA-C        HOLD: ALL Anticoagulant medications until AFTER surgery   Does not apply HOLD Jeremi Martinez PA-C        hydrALAZINE (APRESOLINE) tablet 10 mg  10 mg Oral Q4H PRN Jeremi Martinez PA-C        Or    hydrALAZINE (APRESOLINE) injection 10 mg  10 mg Intravenous Q4H PRN Jeremi Martinez PA-C        lidocaine (LMX4) cream   Topical Q1H PRN Jeremi Martinez PA-C        lidocaine 1 % 0.1-1 mL  0.1-1 mL Other Q1H PRN Jeremi Martinez PA-C        naloxone (NARCAN) injection 0.2 mg  0.2 mg Intravenous Q2 Min PRN Mayra Fonseca MD        Or    naloxone (NARCAN) injection 0.4 mg  0.4 mg Intravenous Q2 Min PRN Mayra Fonseca MD        Or    naloxone (NARCAN) injection 0.2 mg  0.2 mg Intramuscular Q2 Min PRN Mayra Fonseca MD        Or    naloxone (NARCAN) injection 0.4 mg  0.4 mg Intramuscular Q2 Min PRN Mayra Fonseca MD        ondansetron (ZOFRAN ODT) ODT tab 4 mg  4 mg Oral Q6H PRN Jeremi Martinez PA-C        Or    ondansetron (ZOFRAN) injection 4 mg  4 mg Intravenous Q6H PRN Jeremi Martinez PA-C        ondansetron (ZOFRAN ODT) ODT tab 4 mg  4 mg Oral Q6H PRN Jeremi Martinez PA-C        Or    ondansetron (ZOFRAN) injection 4 mg  4 mg Intravenous Q6H PRN Jeremi Martinez PA-C        oxyCODONE (ROXICODONE) tablet 5 mg  5 mg Oral Q4H PRN Jeremi Martinez PA-C   5 mg at 04/18/24 0930    oxyCODONE IR (ROXICODONE) half-tab 2.5 mg   2.5 mg Oral Q4H PRN Jeremi Martinez PA-C        prochlorperazine (COMPAZINE) injection 5 mg  5 mg Intravenous Q6H PRN Jeremi Martinez PA-C        Or    prochlorperazine (COMPAZINE) tablet 5 mg  5 mg Oral Q6H PRN Jeremi Martinez PA-C        Or    prochlorperazine (COMPAZINE) suppository 12.5 mg  12.5 mg Rectal Q12H PRN Jeremi Martinez PA-C        QUEtiapine (SEROquel) tablet 25 mg  25 mg Oral BID PRN Jeremi Martinez PA-C   25 mg at 04/17/24 2338    [Held by provider] ramelteon (ROZEREM) tablet 8 mg  8 mg Oral At Bedtime PRN Jeremi Martinez PA-C        senna-docusate (SENOKOT-S/PERICOLACE) 8.6-50 MG per tablet 1 tablet  1 tablet Oral BID PRN Jeremi Martinez PA-C        Or    senna-docusate (SENOKOT-S/PERICOLACE) 8.6-50 MG per tablet 2 tablet  2 tablet Oral BID PRN Jeremi Martinez PA-C        sodium chloride (PF) 0.9% PF flush 3 mL  3 mL Intracatheter q1 min prn Jeremi Martinez PA-C           Data   Recent Labs   Lab 04/18/24  0812 04/18/24  0733 04/18/24  0106 04/17/24  1544   WBC  --  13.5*  --  20.8*   HGB  --  7.7*  --  8.2*   MCV  --  90  --  90   PLT  --  286  --  312   INR  --  1.25*  --   --    NA  --  135  --  135   POTASSIUM  --  4.7  --  4.8   CHLORIDE  --  98  --  98   CO2  --  25  --  22   BUN  --  29.2*  --  27.1*   CR  --  1.43*  --  1.35*   ANIONGAP  --  12  --  15   LLOYD  --  8.6*  --  8.7*   * 389* 274* 253*       Recent Results (from the past 24 hour(s))   XR Femur Left 2 Views    Narrative    EXAM: XR PELVIS 1/2 VIEWS, XR FEMUR LEFT 2 VIEWS  LOCATION: [facility]  DATE: 4/17/2024    INDICATION: Left hip pain uncertain if new or not  COMPARISON: None.      Impression    IMPRESSION: Acute appearing highly comminuted and displaced intertrochanteric fracture of the proximal left femur. Status post intramedullary sunil and screw fixation; limited evaluation due to positioning. The distal screw head sits 8 mm proud from the   cortical  surface. There is normal joint alignment. Osteopenia. Vascular calcification.    Chest XR,  PA & LAT    Narrative    EXAM: XR CHEST 2 VIEWS  LOCATION: [facility]  DATE: 4/17/2024    INDICATION: Cough, crackles on left  COMPARISON: CT chest 02/25/2024, chest x-ray 01/23/2024      Impression    IMPRESSION: Patient's rotated to the left. Cardiomegaly and slight pulmonary vascular congestion is unchanged. Haziness to the left chest with blunting of the left costophrenic angle is unchanged and reflects posttraumatic changes of the pleura. Old   right lateral seventh rib fracture as well.   XR Pelvis 1/2 Views    Narrative    EXAM: XR PELVIS 1/2 VIEWS, XR FEMUR LEFT 2 VIEWS  LOCATION: [facility]  DATE: 4/17/2024    INDICATION: Left hip pain uncertain if new or not  COMPARISON: None.      Impression    IMPRESSION: Acute appearing highly comminuted and displaced intertrochanteric fracture of the proximal left femur. Status post intramedullary sunil and screw fixation; limited evaluation due to positioning. The distal screw head sits 8 mm proud from the   cortical surface. There is normal joint alignment. Osteopenia. Vascular calcification.    Head CT w/o contrast    Narrative    EXAM: CT HEAD W/O CONTRAST  LOCATION: [facility]  DATE: 4/17/2024    INDICATION: More confused, fall earlier today.  COMPARISON: CT brain 10 02/25/2024.  TECHNIQUE: Routine CT Head without IV contrast. Multiplanar reformats. Dose reduction techniques were used.    FINDINGS:  INTRACRANIAL CONTENTS: No finding for intracranial hemorrhage, mass, or acute infarct. There is moderate enlargement of the lateral ventricles with more mild to moderate prominence of the third ventricle and sulci. Gray-white matter differentiation is   preserved. Small chronic appearing lacunar infarct right caudate head. Mild presumed sequela chronic microvascular ischemic change. No transcortical  areas of low attenuation change. No mass effect or midline shift.    Cerebellar tonsils are normally positioned. Sella is unremarkable for technique. Thinning of the body of the corpus callosum which otherwise appears normally formed.    VISUALIZED ORBITS/SINUSES/MASTOIDS: Prior cataract surgeries. Bilateral scleral calcifications. Visualized paranasal sinuses, middle ear cavities, and mastoid air cells are clear. Incidental note is made of pneumatization of the petrous apices   bilaterally.     BONES/SOFT TISSUES: Calvarium is intact, without fracture or suspicious lytic or blastic foci. No scalp hematoma.      Impression    IMPRESSION:  1.  No finding for intracranial hemorrhage, mass, or acute infarct.    2.  Moderate to advanced volume loss and at least mild presumed sequela chronic microvascular ischemic change. Small chronic lacunar infarct again seen within the right caudate.

## 2024-04-18 NOTE — PROGRESS NOTES
Patient came back with no post procedure orders to release.  Writer called the cath lab and they tried to find Dr. Wall but stated they could not and advised to page him for the orders and provided his pager number [phone].  Writer paged and still waiting a call back.      5:39 pm paged a second time, still no orders.

## 2024-04-18 NOTE — CONSULTS
[facility]    Orthopedic Consultation    Tc Garcia MRN# [id number]   Age: 84 year old YOB: 1940     Date of Admission: 4/17/2024    Reason for consult: Left hip fracture       Requesting physician: Jeremi Martinez PA-C       Level of consult: Consult, follow and place orders           Assessment and Plan:   Assessment:   Acute, closed, comminuted, displaced left periprosthetic proximal femur fracture through the intertrochanteric region  S/p left intertrochanteric femur fracture ORIF using CM device (DOS: 3/23/24 - Affixus) with Dr. Senthil Hartman at [facility]  Acute respiratory failure with hypoxia with suspected fluid overload  Possible community-acquired pneumonia  Atrial fibrillation on Eliquis      Plan:   The patient's history and clinical/diagnostic findings were reviewed with the on-call orthopedic trauma surgeon, Dr. Yahir Pulido. The patient sustained an unwitnessed fall on 3/22/24 resulting in a left intertrochanteric femur fracture s/p ORIF on 3/23/24 at an outside facility. There was reportedly another fall on 4/17/24 that resulted in increased swelling to the left lower extremity. Radiographs consistent with a left periprosthetic proximal femur fracture ORIF and concerns for hardware loosening. NVI. Surgical intervention is recommended for the goals of fracture stabilization, pain control, and to maximize mobility/functionality postoperatively. Brief discussion held over potential risks and benefits of nonoperative and operative management. The patient wishes to proceed with surgery as recommended, but patient will possibly need assistance with medical-decision making. Patient is high-risk for any procedure and cardiology consult and echo read are pending. Hgb is decreasing and 7.7 this morning. Message out to hospitalist to see if okay for transfusion of 1 unit pRBCs. On ceftriaxine for suspected CAP. Uncertain last Eliquis dose, but last night's dose (4/17/24  "PM) was held. Patient will tentatively be scheduled for a left femur IM nail conversion to a ANAI for later today (4/18/24) pending OR availability and medical optimization. Dr. Pulido will further discuss the risks, benefits, and outcomes of surgery while obtaining consent.     -Awaiting cardiology input regarding surgical clearance.  -Defer ordering of pRBCs to medicine team as patient is suspected to have fluid overload. Message out to hospitalist (Dr. Fonseca).  -LLE venous Doppler US ordered to rule out DVT.  -Left knee radiographs ordered to ensure no distal femur fracture.  -Remain NPO until postop. Will update diet order if patient is not cleared for surgery today.  -NWB/bedrest until postop.  -Continue pain regimen.  -Hold any PTA anticoagulation (PTA Eliquis and ASA).   -Vitamin D deficiency lab ordered.  -Type and screen ordered.    Please contact orthopedic trauma team if any questions or concerns arise.           Chief Complaint:   Left hip fracture         History of Present Illness:   Medical history obtained via chart review and discussion with the patient. Tc Garcia is an 84 year old male with complex past medical history including HFpEF, CKD, atrial fibrillation on Eliquis, T2DM, diabetic nephropathy, HTN, pulmonary HTN, and history of IPH who is recently s/p left intertrochanteric femur fracture ORIF (DOS: 3/23/24) at outside institution who was admitted on 4/17/24 for a left proximal femur fracture and probable HF exacerbation and community-acquired pneumonia.     Patient reportedly sustained a fall on 4/17/24 at his TCU, but is unable to recall the details. He states that he was told he was \"walking and just collapsed.\" Patient believes he hit the left eyebrow and left hip on the ground. He is uncertain if he lost consciousness. Patient was taken to [facility] ED and radiographs demonstrated a left periprosthetic proximal femur fracture through the IT region that is highly comminuted. Patient admitted " for pain control, management of CHF and CAP, and surgical intervention for the left hip. Patient states that at rest, he has minimal pain to the left hip. Pain is currently rated as a 2-3/10. Any movement or repositioning causes his left hip pain to become moderate or severe. Patient denies numbness, tingling, and muscle spasms to the left lower extremity. Denies calf pain or cramping, but does have swelling and bruising to the posterior upper calf and knee. Patient has been using a wheelchair per his report since his prior left hip surgery in March. Prior to this, he recalls using a walker to mobilize. PTA Eliquis (uncertain last dose, none since being in the hospital starting 4/17/24 PM). No known prior complications with anesthesia. No known bleeding or clotting disorders. NPO since midnight.          Past Medical History:     Past Medical History:   Diagnosis Date    Anemia     Anemia of chronic disease 5/14/2020    Back pain     CKD (chronic kidney disease) stage 3, GFR 30-59 ml/min (H)     CRF (chronic renal failure), stage 3  5/14/2020    Fall 11/2019    suffered multiple left rib fractures, C3 and T2 fractures    Mixed hyperlipidemia 7/7/2004    Paroxysmal atrial fibrillation (H)     Personal history of colonic polyps 1972    gets colonoscopy every five years, due in 2006    Pleural effusion on left 11/2019    after multiple rib fractures    Pulmonary hypertension (H) 5/10/2020    Added automatically from request for surgery [id number]    Recurrent Left Pleural effusion -- S/P Pleurex Cath 5/12/20 12/30/2019    Rosacea 1989    Type II or unspecified type diabetes mellitus without mention of complication, not stated as uncontrolled 1999    Unspecified essential hypertension 1994             Past Surgical History:     Past Surgical History:   Procedure Laterality Date    ANESTHESIA CARDIOVERSION N/A 2/3/2020    Procedure: ANESTHESIA, FOR CARDIOVERSION;  Surgeon: GENERIC ANESTHESIA PROVIDER;  Location:  OR      RIGHT HEART CATH MEASUREMENTS RECORDED N/A 2020    Procedure: Right Heart Cath;  Surgeon: Senthil Silva MD;  Location:  HEART CARDIAC CATH LAB    HC REMOVE TONSILS/ADENOIDS,<13 Y/O      T & A <11y.o.    IR CHEST TUBE DRAIN TUNNELED LEFT  2020    IR CHEST TUBE PLACEMENT NON-TUNNELLED LEFT  2020    IR CHEST TUBE REMOVAL NON TUNNELED LEFT  2020    IR CHEST TUBE REMOVAL TUNNELED LEFT  2020    LAPAROSCOPIC CHOLECYSTECTOMY N/A 2020    Procedure: CHOLECYSTECTOMY, LAPAROSCOPIC;  Surgeon: Annette Lambert MD;  Location:  OR    LAPAROSCOPIC HERNIORRHAPHY INGUINAL BILATERAL Bilateral 10/27/2020    Procedure: LAPAROSCOPIC BILATERAL INGUINAL HERNIA REPAIR WITH MESH;  Surgeon: Brian Garsai MD;  Location:  OR             Social History:     Social History     Tobacco Use    Smoking status: Former     Current packs/day: 0.00     Average packs/day: 0.2 packs/day for 40.0 years (8.0 ttl pk-yrs)     Types: Cigarettes     Start date:      Quit date: 2008     Years since quittin.3    Smokeless tobacco: Never   Substance Use Topics    Alcohol use: Not Currently     Alcohol/week: 35.0 standard drinks of alcohol             Family History:     Family History   Problem Relation Age of Onset    Family History Negative Mother          age 71    Family History Negative Father          age 70    Diabetes Brother         alive age 77    Diabetes Brother         alive age 76    Family History Negative Brother             Family History Negative Brother             Diabetes Sister         alive age76    Family History Negative Sister          age 86    Heart Disease Sister             Family History Negative Sister             Family History Negative Sister             Diabetes Sister     Diabetes Sister     Diabetes Brother     Diabetes Brother              Immunizations:     VACCINE/DOSE   Diptheria   DPT   DTAP    HBIG   Hepatitis A   Hepatitis B   HIB   Influenza   Measles   Meningococcal   MMR   Mumps   Pneumococcal   Polio   Rubella   Small Pox   TDAP   Varicella   Zoster             Allergies:     Allergies   Allergen Reactions    No Known Drug Allergy              Medications:     Current Facility-Administered Medications   Medication Dose Route Frequency Provider Last Rate Last Admin    acetaminophen (TYLENOL) tablet 650 mg  650 mg Oral Q4H PRN Jeremi Martinez PA-C   650 mg at 04/17/24 2338    Or    acetaminophen (TYLENOL) Suppository 650 mg  650 mg Rectal Q4H PRN Jeremi Martinez PA-C        acetaminophen (TYLENOL) tablet 1,000 mg  1,000 mg Oral TID Jeremi Martinez PA-C   1,000 mg at 04/18/24 0858    amLODIPine (NORVASC) tablet 5 mg  5 mg Oral Daily Jeremi Martinez PA-C   5 mg at 04/18/24 0858    artificial saliva (BIOTENE MT) solution 1 spray  1 spray Mouth/Throat 4x Daily Jeremi Martinez PA-C   1 spray at 04/18/24 0905    [Held by provider] aspirin EC tablet 81 mg  81 mg Oral BID Jeremi Martinez PA-C        calcium carbonate (TUMS) chewable tablet 1,000 mg  1,000 mg Oral 4x Daily PRN Jeremi Martinez PA-C        carvedilol (COREG) tablet 6.25 mg  6.25 mg Oral BID w/meals Jeremi Martinez PA-C   6.25 mg at 04/18/24 0858    cefTRIAXone (ROCEPHIN) 1 g vial to attach to  mL bag for ADULTS or NS 50 mL bag for PEDS  1 g Intravenous Q24H Jeremi Martinez PA-C        glucose gel 15-30 g  15-30 g Oral Q15 Min PRN Jeremi Martinez PA-C        Or    dextrose 50 % injection 25-50 mL  25-50 mL Intravenous Q15 Min PRN Jeremi Martinez PA-C        Or    glucagon injection 1 mg  1 mg Subcutaneous Q15 Min PRN Jeremi Martinez PA-C        [Held by provider] furosemide (LASIX) tablet 20 mg  20 mg Oral Daily Jeremi Martinez PA-C        HOLD: ALL Anticoagulant medications until AFTER surgery   Does not apply HOLD Jeremi Martinez PA-C        hydrALAZINE (APRESOLINE) tablet 10 mg  10  mg Oral Q4H PRN Jeremi Martinez PA-C        Or    hydrALAZINE (APRESOLINE) injection 10 mg  10 mg Intravenous Q4H PRN Jeremi Martinez PA-C        insulin aspart (NovoLOG) injection (RAPID ACTING)  4 Units Subcutaneous TID AC Jeremi Martinez PA-C        insulin aspart (NovoLOG) injection (RAPID ACTING)  1-10 Units Subcutaneous TID AC Jeremi Mratinez PA-C   9 Units at 04/18/24 0857    insulin aspart (NovoLOG) injection (RAPID ACTING)  1-7 Units Subcutaneous At Bedtime Jeremi Martinez PA-C        insulin glargine (LANTUS PEN) injection 8 Units  8 Units Subcutaneous At Bedtime Jeremi Martinez PA-C        lidocaine (LMX4) cream   Topical Q1H PRN Jeremi Martinez PA-C        lidocaine 1 % 0.1-1 mL  0.1-1 mL Other Q1H PRN Jeremi Martinez PA-C        mirtazapine (REMERON) tablet 15 mg  15 mg Oral At Bedtime Jeremi Martinez PA-C        naloxone (NARCAN) injection 0.2 mg  0.2 mg Intravenous Q2 Min PRN Mayra Fonseca MD        Or    naloxone (NARCAN) injection 0.4 mg  0.4 mg Intravenous Q2 Min PRN Mayra Fonseca MD        Or    naloxone (NARCAN) injection 0.2 mg  0.2 mg Intramuscular Q2 Min PRN Mayra Fonseca MD        Or    naloxone (NARCAN) injection 0.4 mg  0.4 mg Intramuscular Q2 Min PRN Mayra Fonseca MD        ondansetron (ZOFRAN ODT) ODT tab 4 mg  4 mg Oral Q6H PRN Jeremi Martinez PA-C        Or    ondansetron (ZOFRAN) injection 4 mg  4 mg Intravenous Q6H PRN Jeremi Martinez PA-C        ondansetron (ZOFRAN ODT) ODT tab 4 mg  4 mg Oral Q6H PRN Jeremi Martinez PA-C        Or    ondansetron (ZOFRAN) injection 4 mg  4 mg Intravenous Q6H PRN Jeremi Martinez PA-C        oxyCODONE (ROXICODONE) tablet 5 mg  5 mg Oral Q4H PRN Jeremi Martinez PA-C   5 mg at 04/18/24 0930    oxyCODONE IR (ROXICODONE) half-tab 2.5 mg  2.5 mg Oral Q4H PRN Jeremi Martinez PA-C        polyethylene glycol (MIRALAX) Packet 17 g  17 g Oral Daily Jeremi Martinez PA-C         prochlorperazine (COMPAZINE) injection 5 mg  5 mg Intravenous Q6H PRN Jeremi Martinez PA-C        Or    prochlorperazine (COMPAZINE) tablet 5 mg  5 mg Oral Q6H PRN Jeremi Martinez PA-C        Or    prochlorperazine (COMPAZINE) suppository 12.5 mg  12.5 mg Rectal Q12H PRN Jeremi Martinez PA-C        QUEtiapine (SEROquel) tablet 25 mg  25 mg Oral BID PRN Jeremi Martinez PA-C   25 mg at 04/17/24 2338    [Held by provider] ramelteon (ROZEREM) tablet 8 mg  8 mg Oral At Bedtime PRN Jeremi Martinez PA-C        senna-docusate (SENOKOT-S/PERICOLACE) 8.6-50 MG per tablet 1 tablet  1 tablet Oral BID PRN Jeremi Martinez PA-C        Or    senna-docusate (SENOKOT-S/PERICOLACE) 8.6-50 MG per tablet 2 tablet  2 tablet Oral BID PRN Jeremi Martinez PA-C        sodium chloride (PF) 0.9% PF flush 3 mL  3 mL Intracatheter Q8H Jeremi Martinez PA-C   3 mL at 04/18/24 0859    sodium chloride (PF) 0.9% PF flush 3 mL  3 mL Intracatheter q1 min prn Jeremi Martinez PA-C        tamsulosin (FLOMAX) capsule 0.4 mg  0.4 mg Oral At Bedtime Jeremi Martinez PA-C   0.4 mg at 04/17/24 2338             Review of Systems:   CV: NEGATIVE for chest pain, palpitations or peripheral edema  C: NEGATIVE for fever, chills, change in weight  E/M: NEGATIVE for ear, mouth and throat problems  R: POSITIVE for some SOB, NEGATIVE for significant cough          Physical Exam:   All vitals have been reviewed  Patient Vitals for the past 24 hrs:   BP Temp Temp src Pulse Resp SpO2 Height Weight   04/18/24 0807 (!) 141/79 97.8  F (36.6  C) Oral 80 20 98 % -- --   04/18/24 0712 -- -- -- -- -- -- -- 63.7 kg (140 lb 6.9 oz)   04/18/24 0652 (!) 160/78 98  F (36.7  C) Oral 78 18 94 % -- --   04/18/24 0038 122/85 97.6  F (36.4  C) Oral 81 20 93 % -- --   04/18/24 0000 (!) 151/94 -- -- 70 24 96 % -- --   04/17/24 2350 (!) 151/80 -- -- 74 24 91 % -- --   04/17/24 2330 (!) 161/88 -- -- 73 -- -- -- --   04/17/24 2245 -- -- -- -- -- 97 % -- --  "  04/17/24 1900 130/75 -- -- 75 -- -- -- --   04/17/24 1830 (!) 142/71 -- -- 76 -- 94 % -- --   04/17/24 1730 (!) 154/90 -- -- 88 -- -- -- --   04/17/24 1715 138/75 -- -- 70 -- 97 % -- --   04/17/24 1700 (!) 149/80 -- -- 76 -- 96 % -- --   04/17/24 1645 (!) 141/79 -- -- 79 -- -- -- --   04/17/24 1630 (!) 140/81 -- -- 77 -- -- -- --   04/17/24 1615 126/74 -- -- 81 -- -- -- --   04/17/24 1600 134/72 -- -- 74 -- 95 % -- --   04/17/24 1545 (!) 134/93 -- -- 87 -- 93 % -- --   04/17/24 1533 134/68 98.4  F (36.9  C) Oral 86 24 90 % 1.753 m (5' 9\") 72.6 kg (160 lb)       Intake/Output Summary (Last 24 hours) at 4/18/2024 0950  Last data filed at 4/18/2024 0711  Gross per 24 hour   Intake 100 ml   Output 1400 ml   Net -1300 ml       Constitutional: Pleasant, alert, appropriate, following commands. NAD. Very frail. Appears slightly pale.   HEENT: Head atraumatic normocephalic. Pupils equal round and reactive.  Respiratory: Unlabored breathing no audible wheeze  Cardiovascular: Regular rate and rhythm per pulses.  GI: Abdomen is non-distended.  Lymph/Hematologic: No lymphadenopathy in areas examined.  Skin: No rashes, no cyanosis.  Musculoskeletal: Left lower extremity: Shortened and externally rotated. Well-healing surgical sites to the left lateral hip region from recent surgery. No drainage or dehiscence. Minimal surrounding erythema. No ecchymosis to the left hip, but there is ecchymosis to the posterior distal thigh, posterior knee, and proximal calf with associated diffuse swelling. Left dorsal foot is edematous. No palpable knee effusion. Thigh and lower leg compartments are soft and compressible. Diffusely tender to the left lateral and anterior hip. Nontender over the distal femoral region, medial and lateral joint lines of the knee, popliteal fossa, patella, calf, and ankle/foot diffusely. No attempts made to range the left hip or knee. Able to actively DF and PF the ankles, flex and extend the toes, bilaterally. DP " pulse palpable. Toes are warm and well-perfused. SILT L3-S1.  Neurologic: GCS 15, A&OX2         Data:   All laboratory data reviewed  Results for orders placed or performed during the hospital encounter of 04/17/24   Chest XR,  PA & LAT     Status: None    Narrative    EXAM: XR CHEST 2 VIEWS  LOCATION: [facility]  DATE: 4/17/2024    INDICATION: Cough, crackles on left  COMPARISON: CT chest 02/25/2024, chest x-ray 01/23/2024      Impression    IMPRESSION: Patient's rotated to the left. Cardiomegaly and slight pulmonary vascular congestion is unchanged. Haziness to the left chest with blunting of the left costophrenic angle is unchanged and reflects posttraumatic changes of the pleura. Old   right lateral seventh rib fracture as well.   XR Pelvis 1/2 Views     Status: None    Narrative    EXAM: XR PELVIS 1/2 VIEWS, XR FEMUR LEFT 2 VIEWS  LOCATION: [facility]  DATE: 4/17/2024    INDICATION: Left hip pain uncertain if new or not  COMPARISON: None.      Impression    IMPRESSION: Acute appearing highly comminuted and displaced intertrochanteric fracture of the proximal left femur. Status post intramedullary sunil and screw fixation; limited evaluation due to positioning. The distal screw head sits 8 mm proud from the   cortical surface. There is normal joint alignment. Osteopenia. Vascular calcification.    XR Femur Left 2 Views     Status: None    Narrative    EXAM: XR PELVIS 1/2 VIEWS, XR FEMUR LEFT 2 VIEWS  LOCATION: [facility]  DATE: 4/17/2024    INDICATION: Left hip pain uncertain if new or not  COMPARISON: None.      Impression    IMPRESSION: Acute appearing highly comminuted and displaced intertrochanteric fracture of the proximal left femur. Status post intramedullary sunil and screw fixation; limited evaluation due to positioning. The distal screw head sits 8 mm proud from the   cortical surface. There is normal joint alignment. Osteopenia.  Vascular calcification.    Head CT w/o contrast     Status: None    Narrative    EXAM: CT HEAD W/O CONTRAST  LOCATION: [facility]  DATE: 4/17/2024    INDICATION: More confused, fall earlier today.  COMPARISON: CT brain 10 02/25/2024.  TECHNIQUE: Routine CT Head without IV contrast. Multiplanar reformats. Dose reduction techniques were used.    FINDINGS:  INTRACRANIAL CONTENTS: No finding for intracranial hemorrhage, mass, or acute infarct. There is moderate enlargement of the lateral ventricles with more mild to moderate prominence of the third ventricle and sulci. Gray-white matter differentiation is   preserved. Small chronic appearing lacunar infarct right caudate head. Mild presumed sequela chronic microvascular ischemic change. No transcortical areas of low attenuation change. No mass effect or midline shift.    Cerebellar tonsils are normally positioned. Sella is unremarkable for technique. Thinning of the body of the corpus callosum which otherwise appears normally formed.    VISUALIZED ORBITS/SINUSES/MASTOIDS: Prior cataract surgeries. Bilateral scleral calcifications. Visualized paranasal sinuses, middle ear cavities, and mastoid air cells are clear. Incidental note is made of pneumatization of the petrous apices   bilaterally.     BONES/SOFT TISSUES: Calvarium is intact, without fracture or suspicious lytic or blastic foci. No scalp hematoma.      Impression    IMPRESSION:  1.  No finding for intracranial hemorrhage, mass, or acute infarct.    2.  Moderate to advanced volume loss and at least mild presumed sequela chronic microvascular ischemic change. Small chronic lacunar infarct again seen within the right caudate.   Basic metabolic panel     Status: Abnormal   Result Value Ref Range    Sodium 135 135 - 145 mmol/L    Potassium 4.8 3.4 - 5.3 mmol/L    Chloride 98 98 - 107 mmol/L    Carbon Dioxide (CO2) 22 22 - 29 mmol/L    Anion Gap 15 7 - 15 mmol/L    Urea Nitrogen 27.1 (H) 8.0  - 23.0 mg/dL    Creatinine 1.35 (H) 0.67 - 1.17 mg/dL    GFR Estimate 51 (L) >60 mL/min/1.73m2    Calcium 8.7 (L) 8.8 - 10.2 mg/dL    Glucose 253 (H) 70 - 99 mg/dL   Ellis Draw     Status: None    Narrative    The following orders were created for panel order Ellis Draw.  Procedure                               Abnormality         Status                     ---------                               -----------         ------                     Extra Blood Culture Bottle[[id number]]                       Final result               Extra Blue Top Tube[[id number]]                              Final result               Extra Red Top Tube[[id number]]                               Final result               Extra Green Top (Lithium...[[id number]]                      Final result                 Please view results for these tests on the individual orders.   CBC with platelets and differential     Status: Abnormal   Result Value Ref Range    WBC Count 20.8 (H) 4.0 - 11.0 10e3/uL    RBC Count 2.90 (L) 4.40 - 5.90 10e6/uL    Hemoglobin 8.2 (L) 13.3 - 17.7 g/dL    Hematocrit 26.1 (L) 40.0 - 53.0 %    MCV 90 78 - 100 fL    MCH 28.3 26.5 - 33.0 pg    MCHC 31.4 (L) 31.5 - 36.5 g/dL    RDW 17.5 (H) 10.0 - 15.0 %    Platelet Count 312 150 - 450 10e3/uL    % Neutrophils 89 %    % Lymphocytes 1 %    % Monocytes 8 %    % Eosinophils 1 %    % Basophils 0 %    % Immature Granulocytes 1 %    NRBCs per 100 WBC 0 <1 /100    Absolute Neutrophils 18.4 (H) 1.6 - 8.3 10e3/uL    Absolute Lymphocytes 0.2 (L) 0.8 - 5.3 10e3/uL    Absolute Monocytes 1.7 (H) 0.0 - 1.3 10e3/uL    Absolute Eosinophils 0.2 0.0 - 0.7 10e3/uL    Absolute Basophils 0.1 0.0 - 0.2 10e3/uL    Absolute Immature Granulocytes 0.2 <=0.4 10e3/uL    Absolute NRBCs 0.0 10e3/uL   Extra Blood Culture Bottle     Status: None   Result Value Ref Range    Hold Specimen JIC    Extra Blue Top Tube     Status: None   Result Value Ref Range    Hold Specimen JIC    Extra Red Top Tube      Status: None   Result Value Ref Range    Hold Specimen JIC    Extra Green Top (Lithium Heparin) ON ICE     Status: None   Result Value Ref Range    Hold Specimen x    Lactic acid whole blood     Status: Normal   Result Value Ref Range    Lactic Acid 1.9 0.7 - 2.0 mmol/L   Procalcitonin     Status: Abnormal   Result Value Ref Range    Procalcitonin 1.06 (H) <0.50 ng/mL   Symptomatic Influenza A/B, RSV, & SARS-CoV2 PCR (COVID-19) Nasopharyngeal     Status: Normal    Specimen: Nasopharyngeal; Swab   Result Value Ref Range    Influenza A PCR Negative Negative    Influenza B PCR Negative Negative    RSV PCR Negative Negative    SARS CoV2 PCR Negative Negative    Narrative    Testing was performed using the Xpert Xpress CoV2/Flu/RSV Assay on the Cepheid GeneXpert Instrument. This test should be ordered for the detection of SARS-CoV-2, influenza, and RSV viruses in individuals who meet clinical and/or epidemiological criteria. Test performance is unknown in asymptomatic patients. This test is for in vitro diagnostic use under the FDA EUA for laboratories certified under CLIA to perform high or moderate complexity testing. This test has not been FDA cleared or approved. A negative result does not rule out the presence of PCR inhibitors in the specimen or target RNA in concentration below the limit of detection for the assay. If only one viral target is positive but coinfection with multiple targets is suspected, the sample should be re-tested with another FDA cleared, approved, or authorized test, if coinfection would change clinical management. This test was validated by the [facility]. These laboratories are certified under the Clinical Laboratory Improvement Amendments of 1988 (CLIA-88) as qualified to perform high complexity laboratory testing.   UA with Microscopic reflex to Culture     Status: Abnormal    Specimen: Urine, Clean Catch   Result Value Ref Range    Color Urine Straw Colorless, Straw,  Light Yellow, Yellow    Appearance Urine Clear Clear    Glucose Urine 200 (A) Negative mg/dL    Bilirubin Urine Negative Negative    Ketones Urine Negative Negative mg/dL    Specific Gravity Urine 1.006 1.003 - 1.035    Blood Urine Negative Negative    pH Urine 7.0 5.0 - 7.0    Protein Albumin Urine Negative Negative mg/dL    Urobilinogen Urine Normal Normal, 2.0 mg/dL    Nitrite Urine Negative Negative    Leukocyte Esterase Urine Negative Negative    RBC Urine <1 <=2 /HPF    WBC Urine <1 <=5 /HPF    Squamous Epithelials Urine <1 <=1 /HPF    Narrative    Urine Culture not indicated   BNP     Status: Abnormal   Result Value Ref Range    N terminal Pro BNP Inpatient 5,202 (H) 0 - 1,800 pg/mL   Troponin T, High Sensitivity     Status: Abnormal   Result Value Ref Range    Troponin T, High Sensitivity 39 (H) <=22 ng/L   Troponin T, High Sensitivity     Status: Abnormal   Result Value Ref Range    Troponin T, High Sensitivity 38 (H) <=22 ng/L   Glucose by meter     Status: Abnormal   Result Value Ref Range    GLUCOSE BY METER POCT 274 (H) 70 - 99 mg/dL   CBC with platelets     Status: Abnormal   Result Value Ref Range    WBC Count 13.5 (H) 4.0 - 11.0 10e3/uL    RBC Count 2.82 (L) 4.40 - 5.90 10e6/uL    Hemoglobin 7.7 (L) 13.3 - 17.7 g/dL    Hematocrit 25.4 (L) 40.0 - 53.0 %    MCV 90 78 - 100 fL    MCH 27.3 26.5 - 33.0 pg    MCHC 30.3 (L) 31.5 - 36.5 g/dL    RDW 17.2 (H) 10.0 - 15.0 %    Platelet Count 286 150 - 450 10e3/uL   Basic metabolic panel     Status: Abnormal   Result Value Ref Range    Sodium 135 135 - 145 mmol/L    Potassium 4.7 3.4 - 5.3 mmol/L    Chloride 98 98 - 107 mmol/L    Carbon Dioxide (CO2) 25 22 - 29 mmol/L    Anion Gap 12 7 - 15 mmol/L    Urea Nitrogen 29.2 (H) 8.0 - 23.0 mg/dL    Creatinine 1.43 (H) 0.67 - 1.17 mg/dL    GFR Estimate 48 (L) >60 mL/min/1.73m2    Calcium 8.6 (L) 8.8 - 10.2 mg/dL    Glucose 389 (H) 70 - 99 mg/dL   INR     Status: Abnormal   Result Value Ref Range    INR 1.25 (H) 0.85 -  1.15   Glucose by meter     Status: Abnormal   Result Value Ref Range    GLUCOSE BY METER POCT 382 (H) 70 - 99 mg/dL   EKG 12-lead, tracing only     Status: None (Preliminary result)   Result Value Ref Range    Systolic Blood Pressure  mmHg    Diastolic Blood Pressure  mmHg    Ventricular Rate 73 BPM    Atrial Rate  BPM    IL Interval  ms    QRS Duration 92 ms     ms    QTc 460 ms    P Axis  degrees    R AXIS 78 degrees    T Axis 5 degrees    Interpretation ECG       Atrial fibrillation with premature ventricular or aberrantly conducted complexes  Incomplete right bundle branch block  Possible Anteroseptal infarct , age undetermined  Abnormal ECG  When compared with ECG of 18-MAR-2024 14:21,  Borderline criteria for Anteroseptal infarct are now Present  Nonspecific T wave abnormality has replaced inverted T waves in Inferior leads     Adult Type and Screen     Status: None   Result Value Ref Range    ABO/RH(D) O POS     Antibody Screen Negative Negative    SPECIMEN EXPIRATION DATE [id number]    CBC with platelets + differential     Status: Abnormal    Narrative    The following orders were created for panel order CBC with platelets + differential.  Procedure                               Abnormality         Status                     ---------                               -----------         ------                     CBC with platelets and d...[[id number]]  Abnormal            Final result                 Please view results for these tests on the individual orders.   ABO/Rh type and screen *Canceled*     Status: None ()    Narrative    The following orders were created for panel order ABO/Rh type and screen.  Procedure                               Abnormality         Status                     ---------                               -----------         ------                       Please view results for these tests on the individual orders.   ABO/Rh type and screen     Status: None    Narrative    The  following orders were created for panel order ABO/Rh type and screen.  Procedure                               Abnormality         Status                     ---------                               -----------         ------                     Adult Type and Screen[[id number]]                            Final result                 Please view results for these tests on the individual orders.          Attestation:  I have reviewed today's vital signs, notes, medications, labs and imaging with Dr. Yahir Pulido.  Amount of time performed on this consult: 50 minutes.    Carolyn Gutierrez PA-C  [facility]

## 2024-04-18 NOTE — PHARMACY-ADMISSION MEDICATION HISTORY
Pharmacy Intern Admission Medication History    Admission medication history is complete. The information provided in this note is only as accurate as the sources available at the time of the update.    Information Source(s): Facility (U/NH/) medication list/MAR via N/A    Pertinent Information:   - pt arrives to ED from [facility] in [place]    Changes made to PTA medication list:  Added: acetaminophen, aspirin, furosemide, hydrocodone-acetaminophen, lactobacillus probiotic  Deleted: none  Changed: amlodipine: 10 mg daily -> 5 mg daily, carvedilol: 12.5 mg BID -> 6.25 mg BID, Humalog Kwikpen: 5 units QAM, 4 units Qlunch, 3 units QPM + sliding scale TID -> 4 units TID, Lantus: 12 units -> 8 units    Allergies reviewed with patient and updates made in EHR: unable to assess    Medication History Completed By: Yohana Andres 4/17/2024 8:22 PM    PTA Med List   Medication Sig Last Dose    acetaminophen (TYLENOL) 500 MG tablet Take 1,000 mg by mouth 3 times daily - 0800, 1400, 2000 4/17/2024 at 1400    amLODIPine (NORVASC) 5 MG tablet Take 5 mg by mouth daily - 0800 4/17/2024 at 0800    aspirin 81 MG EC tablet Take 81 mg by mouth 2 times daily - 0800, 1730 4/17/2024 at 0800    carvedilol (COREG) 6.25 MG tablet Take 6.25 mg by mouth 2 times daily (with meals) - 0800, 1730     furosemide (LASIX) 20 MG tablet Take 20 mg by mouth daily 4/17/2024 at 0800    glucose (BD GLUCOSE) 4 g chewable tablet Take 4 tablets by mouth every 15 minutes as needed for low blood sugar PRN    HYDROcodone-acetaminophen (NORCO) 5-325 MG tablet Take 1 tablet by mouth every 4 hours as needed for severe pain PRN    insulin glargine (LANTUS PEN) 100 UNIT/ML pen Inject 8 Units Subcutaneous at bedtime     insulin lispro (HUMALOG KWIKPEN) 100 UNIT/ML (1 unit dial) KWIKPEN Inject 4 Units Subcutaneous 3 times daily (before meals) Hold for BG less than 90 4/17/2024 at 1400    isosorbide mononitrate (IMDUR) 60 MG 24 hr tablet Take 1 tablet (60 mg)  by mouth every evening (Patient taking differently: Take 60 mg by mouth daily) 4/17/2024 at 0800    LACTOBACILLUS PO Take 1 capsule by mouth daily 4/16/2024 at 2000    Lidocaine (LIDOCARE) 4 % Patch Place 1 patch onto the skin daily as needed for moderate pain To prevent lidocaine toxicity, patient should be patch free for 12 hrs daily. PRN    mirtazapine (REMERON) 15 MG tablet Take 1 tablet (15 mg) by mouth at bedtime 4/16/2024 at 2000    ramelteon (ROZEREM) 8 MG tablet Take 1 tablet (8 mg) by mouth nightly as needed for sleep PRN    tamsulosin (FLOMAX) 0.4 MG capsule Take 1 capsule (0.4 mg) by mouth every morning (Patient taking differently: Take 0.4 mg by mouth at bedtime) 4/16/2024 at 2000

## 2024-04-18 NOTE — PLAN OF CARE
Goal Outcome Evaluation:      Plan of Care Reviewed With: patient    Overall Patient Progress: no changeOverall Patient Progress: no change     A&O x 3, forgetful. VSS, 2L NC.  CMS intact. Bedrest. Voiding adequately in external cath. NPO.  Tele A-fib with CVR. Hgb 7.7, blood transfusion in progress. Pending cardiac cath.  Please see ortho provider note for surgery plan.

## 2024-04-18 NOTE — PROGRESS NOTES
Admitted at 0030H d/t SOB and L intertrochanteric fx  from a fall.   Alert and oriented x 2. VSS, on O2 at 2lpm/NC. CMS intact. Verbalizes mild pain with T/R, otherwise denies pain at rest. Scattered bruises mostly to renu UE, chest, LLE. Skin tears to renu UE, covered with mepilex. Old healed surgical incision to L hip,  edema to LLE.  Occasional productive cough.   Tele: Afib with CVR. Bedrest. External male cath, adequate o/p. NPO initiated at midnight. PIV saline locked. Has cardiology and Ortho consults today.

## 2024-04-18 NOTE — ED NOTES
Patient set up with a purwik catheter.  He is quite confused and asking many repetitive questions.  He is having periods of frustration about being admitted to the hospital.  He was provided with a snack, EDT is working on finding him a sandwich.

## 2024-04-18 NOTE — H&P
[facility]  History and Physical - Hospitalist Service  Date of Admission:  4/17/2024  PRIMARY CARE PROVIDER:  No Ref-Primary, Physician    Assessment & Plan           Assessment/Plan:      Tc Garcia is a 84 year old male , with a PMH of HFpEF, CKD, A-fib on AC, DM 2, diabetic nephropathy, HTN, pulmonary HTN, Hx IPH, POLST, who was admitted on 4/17/2024from TCU w SOB and leg swelling    Principal Problem:    Acute respiratory failure with hypoxia (H)  Active Problems:    Atrial fibrillation/flutter -- on Eliquis and Amiodarone    Anticoagulated    Diabetic nephropathy associated with type 2 diabetes mellitus (H)    Type 2 diabetes mellitus with hyperglycemia, with long-term current use of insulin (H)    Closed nondisplaced comminuted fracture of shaft of left femur with routine healing    Acute exacerbation of CHF (congestive heart failure) (H)    Femur fracture, left (H)    Per ED MD- recent hosp for HF exacerb, at TCU. C/c fall, leg swelling, SOB, hypoxia. today had a  w unwitnessed fall Hx of left hip fracture 3/22/2024 status post repair, new XR w comminuted prox L femur fx w IM screw, Ortho aware. Hgb 8.2 (avg ~9).,CXR c/w vol overload, Req 2l here BNP 5202 (variable past), pend lasix IV (PTA 40 ). covid/flu/rsv neg.WBC 20 ,. procal 1- pending abx, Trop 39 (last 46).  cr ok 1.35 (baseline 1.6-1.7). Await urine.  Per chart review pulses he is DNR and comfort measures to be primary focus, with avoidance of hospitalization.    Fall, with left hip fracture  S/p L femur fx 3/23 repair   s/p left hip IM nail 3/23/24 at [facility] Presents from TCU w reported fall, SOB/, leg pain. XR w new left hip fracture,.  Reportedly fell today though he is adamantly denying that.  (patient thought confused).  -Admit inpatient  -Orthopedics aware  -N.p.o. at midnight-Possible hip repair 4/18  -Cards consulted preop as below  -Hold PTA DOAC  - Pain rx reviewed  - APAP TID  - Oxycodne 1st prn  -PTA ASA 81  twice daily (DVT PPx?) held  -AM CBC BMP    Normocytic anemia   With recent hip fx.Last baseline hemoglobin 4/15 7.8 with an MCV 90.6.   On arrival he is 8.2, with shortness of breath which is more likely possibly due to volume overload vs anemia.  -Hemoglobin daily  -Hold AC    Acute respiratory failure with hypoxia   HFpEF, Suspected volume overload  Elevated troponin, likely demand ischemia  PTA Lasix 20 (recent incr). One of c/c is shortness of breath.  CXR favors somewhat wet per ED.  BNP 5000, last BNP 8120 on 4/15 (being seen for chest pain in the ED, they felt he likely was volume overloaded and increased his diuretic to 20 daily.).  Was given Lasix 20 IV in ED.  Troponin 36, higher in past but was 11 on 4/15.  No CP sx. Doubt ACS. Will get cards to see in am to eval diuresis, clear before ortho procedure  -O2 as needed sats below 90%  -Echocardiogram to be updated  -Cardiology consult for future preop clearance  -PTA Lasix 20--> 40 IV in ED  - defer future dose to cards  -PTA carvedilol 6.25 twice daily resume  -PTA amlodipine 5 QD can resume if SBP>120  -PTA isosorbide 60 QD held preop (defer to cards)  -trops low, check am  -new ECG ordered    Possible CAP, elevated Pro-Hai  -Noted to have elevated Pro-Hai and a white count of 20, though could be demargination secondary to pain/fracture.  Afebrile.  CXR favors old opacity without clear infiltrate.  But with the Pro-Hai think it is possible he has a community-acquired pneumonia (with caution could be HCAP).  For now we will just cover with Rocephin and defer on atypical coverage  -Rocephin 1 g every 24 hours  -AM CBC    History of moderate pulmonary HTN  -New echo    A-fib on AC  -PTA Eliquis held with above hip fracture.  -PTA rate control with coreg    Possible vomiting blood 4/17  Per report from daughter from the TCU.  Patient denies any abdominal symptoms and is not vomiting but will watch very carefully for that  -Watch for any hematochezia    CKD   3b, hx nephrotic proteinuria  Last baseline creatinine 1.3 on 4/15/2024, although on chart review appears 1.6.  He is at 1.3 at this time.  -AM BMP  -Holding off on IVF due to volume overload  -Avoid nephrotoxins    DM2 w hyperglycemia  >250 on admit, ate tonight, resume lantus   - POCT Bg QID  -PTA Lantus 8 at bedtime for today, resume tomorrow postop  - HIGH SSI for now  -PTA Humalog 4 units TID CC, holding tonight, resume pm tomorrow (if postop)     History mood disorder  -PTA mirtazapine 15 HS held for today    BPH  PTA flomax resumed    History IPH 12/2023  History of completed stroke  -Updated CTH without current bleed-CT head with small chronic lacunar infarct right caudate.  Moderate to advanced volume loss could be consistent with suspected PTA dementia also  -Above DOAC, statin    Acute metabolic encephalopathy  Previous cognitive declines  Daughter notes that patient is at cognitive claims history of at times paranoia delusions.  Last slums 17/30.  No formal dementia diagnosis but in DDx.  Past stroke as above.  History of alcohol abuse possible contributes.  He is quite irritable and seems confused.  - OT recheck cog eval postop  -Seroquel as needed any worsening agitation not amenable with nonpharmacologic redirection    Recent multiple cervical and upper thoracic vertebral fractures  Per chart review recent C6 and T1 compression fractures, chronic C7 and T12 compression fractures.  Cervical stenosis.-Outpatient followed by neurosurgery.    Goals of care  -Remains DNR/DNI.  -POLST-previous patient requesting avoid hospitalization.  Pt rescinded since    Clinically Significant Risk Factors Present on Admission                  # Hypertension: Noted on problem list  # Chronic heart failure with preserved ejection fraction: heart failure noted on problem list and last echo with EF >50%    # DMII: A1C = 9.0 % (Ref range: <5.7 %) within past 6 months       # Financial/Environmental Concerns:            Diet:   NPO at 12a  DVT Prophylaxis: Pneumatic Compression Devices  Pruett Catheter: Not present  Lines: None     Cardiac Monitoring: None  Code Status:  DNR/Dni per last notes and pt today  Disposition: Inpt  Disposition Plan      The patient has [facility], who agrees with the assessment and plan as noted  The patient's care was discussed with the Attending Physician,   , Bedside Nurse, Patient, and ED MD .  Entered: Jeremi Martinez PA-C 04/17/2024, 7:29 PM       Jeremi Martinez PA-C  [facility]  Securely message with the Vocera Web Console (learn more here)  Text page via [employer] Paging/Directory     ______________________________________________________________________    Chief Complaint         Subjective:    History of Present Illness   History is obtained from the ED provider, patient and EMR.  Pt is considered a reliable an Unhistorian, appears to be denying known history, somewhat irritable and argumentative, and suspected delirium    Patient endorses left hip pain as c.c. Modeate to sever at times.  He endorses mild shortness of breath.  Denies any chest pain or pressure.  Denies any abdominal symptoms.  Has no urinary symptoms.  He is able to tell me a little bit about being in rehab recently.    Patient's memory is suspect-patient is insistent he did not fall today.  That the staff at the TCU is lying about the reported fall.  He cannot tell me why his left hip is broken.  He does state it hurts.  He wants some pain meds.  Patient's insisted that the staff here does not like him, and were denying him food, ED RN indicates he on the other hand was given a meal but is still unhappy.  Patient's insisted that staff has accused him of spilling his water which he did not do.  Patient is able to tell me he is at [facility] and gets the correct month and year.  But otherwise seems relatively delirious irritable and unlikely to be following  "recent history.  -He cannot tell me anything about his meds.    Patient does confirm he is DNR/DNI \"just kill me\".  I gently explained what that meant and he reconfirmed DNR/DNI that seems consistent with his past code wishes.     Dgtr Camille was update via phone. She adds that the TCU staff told her that he was vomiting blood (new info to me). She reports [facility] was 17/30 outpt recently. He gets worse at times, and seems to have delusions, or endorses events that did not happen. Hx alc abuse.     Past Medical History    I have reviewed this patient's medical history and updated it with pertinent information if needed.   Past Medical History:   Diagnosis Date    Anemia     Anemia of chronic disease 5/14/2020    Back pain     CKD (chronic kidney disease) stage 3, GFR 30-59 ml/min (H)     CRF (chronic renal failure), stage 3  5/14/2020    Fall 11/2019    suffered multiple left rib fractures, C3 and T2 fractures    Mixed hyperlipidemia 7/7/2004    Paroxysmal atrial fibrillation (H)     Personal history of colonic polyps 1972    gets colonoscopy every five years, due in 2006    Pleural effusion on left 11/2019    after multiple rib fractures    Pulmonary hypertension (H) 5/10/2020    Added automatically from request for surgery [id number]    Recurrent Left Pleural effusion -- S/P Pleurex Cath 5/12/20 12/30/2019    Rosacea 1989    Type II or unspecified type diabetes mellitus without mention of complication, not stated as uncontrolled 1999    Unspecified essential hypertension 1994       Prior to Admission Medications   Prior to Admission Medications   Prescriptions Last Dose Informant Patient Reported? Taking?   Alcohol Swabs PADS  Nursing Home No No   Sig: Use to swab the area of the injection or bina as directed Per insurance coverage   Continuous Blood Gluc  (DEXCOM G6 ) JOSE  Nursing Home Yes No   Sig: Dexcom G6    USE EACH WITH 10 DAYS SENSORS   Lidocaine (LIDOCARE) 4 % Patch  Nursing Home Yes " No   Sig: Place 1 patch onto the skin daily as needed for moderate pain To prevent lidocaine toxicity, patient should be patch free for 12 hrs daily.   amLODIPine (NORVASC) 10 MG tablet  Nursing Home No No   Sig: Take 1 tablet (10 mg) by mouth daily   blood glucose (NO BRAND SPECIFIED) lancets standard  Nursing Home No No   Sig: To use to test glucose level in the blood Use to test blood sugar  4  times daily as directed. To accompany glucose monitor brands per insurance coverage.   blood glucose (NO BRAND SPECIFIED) test strip  Nursing Home No No   Sig: To use to test glucose level in the blood Use to test blood sugar  4 times daily as directed. To accompany glucose monitor brands per insurance coverage.   blood glucose monitoring (NO BRAND SPECIFIED) meter device kit  Nursing Home No No   Sig: Use as directed Per insurance coverage   carvedilol (COREG) 12.5 MG tablet  Nursing Home No No   Sig: Take 1 tablet (12.5 mg) by mouth 2 times daily (with meals)   glucose (BD GLUCOSE) 4 g chewable tablet  Nursing Home No No   Sig: Take 4 tablets by mouth every 15 minutes as needed for low blood sugar   insulin glargine (LANTUS PEN) 100 UNIT/ML pen  Nursing Home Yes No   Sig: Inject 12 Units Subcutaneous every evening   insulin lispro (HUMALOG KWIKPEN) 100 UNIT/ML (1 unit dial) [facility] Yes No   Sig: Inject Subcutaneous 3 times daily (before meals) -200 = 1 unit, 201-250 = 2 units, 251-300 = 3 units, 301-350 = 4 units, 351-400 = 5 units, 401 or greater = 6 units and call provider   insulin lispro (HUMALOG KWIKPEN) 100 UNIT/ML (1 unit dial) [facility] Yes No   Sig: Inject 5 Units Subcutaneous daily (with dinner)   insulin lispro (HUMALOG KWIKPEN) 100 UNIT/ML (1 unit dial) [facility] Yes No   Sig: Inject 3 Units Subcutaneous daily (with breakfast)   insulin lispro (HUMALOG KWIKPEN) 100 UNIT/ML (1 unit dial) [facility] Yes No   Sig: Inject 4 Units Subcutaneous daily (with  lunch)   insulin pen needle (32G X 4 MM) 32G X 4 MM miscellaneous  Nursing Home No No   Sig: Use as directed by provider Per insurance coverage   isosorbide mononitrate (IMDUR) 60 MG 24 hr tablet  Nursing Home No No   Sig: Take 1 tablet (60 mg) by mouth every evening   Patient taking differently: Take 60 mg by mouth daily   mirtazapine (REMERON) 15 MG tablet  Nursing Home No No   Sig: Take 1 tablet (15 mg) by mouth at bedtime   ramelteon (ROZEREM) 8 MG tablet  Nursing Home Yes No   Sig: Take 1 tablet (8 mg) by mouth nightly as needed for sleep   tamsulosin (FLOMAX) 0.4 MG capsule  Nursing Home No No   Sig: Take 1 capsule (0.4 mg) by mouth every morning      Facility-Administered Medications: None     Allergies   Allergies   Allergen Reactions    No Known Drug Allergy          Physical Exam                Objective/Exam:      Vital Signs: Temp: 98.4  F (36.9  C) Temp src: Oral BP: 130/75 Pulse: 75   Resp: 24 SpO2: 94 % O2 Device: Nasal cannula Oxygen Delivery: 2 LPM  Vitals:    04/17/24 1715 04/17/24 1730 04/17/24 1830 04/17/24 1900   BP: 138/75 (!) 154/90 (!) 142/71 130/75   Pulse: 70 88 76 75   Resp:       Temp:       TempSrc:       SpO2: 97%  94%    Weight:       Height:         Weight: 160 lbs 0 oz    Lines/monitoring:   Constitutional: Awake, alert, cooperative, no apparent distress.    ENT: Normocephalic, Normal sclera.    Neck: No JVD no adenopathy.  Pulmonary: NC on 2lpm, No increased work of breathing, good air exchange, clear to auscultation bilaterally, no crackles or wheezing.  Cardiovascular: Regular rate and rhythm, normal S1 and S2, no murmur noted.  GI: bowel sounds present and normoactive, soft, non-distended, non-tender..  : has a Purewick    Extremities: L hip is TTP and L leg internally rotated, No lower extremity edema noted, Feet warm  Skin/Integumen: Visualized skin w/o infectious signs, warm, dry.  Neuro: Nonfocal,  no droop  Psych:  Alert and oriented x 3 (person, place and month), but very  irritable affect, appears to have delusions/hallucinating about events that did not happen.  Think staff is conspiring against him.    Medical Decision Making       Please see A&P for additional details of medical decision making.  MANAGEMENT DISCUSSED with the following over the past 24 hours:     NOTE(S)/MEDICAL RECORDS REVIEWED over the past 24 hours:            Data   Data reviewed today: I reviewed all medications, new labs and imaging results over the last 24 hours.           Labs:        CBC with Diff:  Recent Labs   Lab Test 04/17/24  1544 03/20/24  0953 03/18/24  1206   WBC 20.8*  --  10.2   HGB 8.2* 9.2* 9.5*   MCV 90  --  90     --  280        Comprehensive Metabolic Panel:  Recent Labs   Lab 04/17/24  1544      POTASSIUM 4.8   CHLORIDE 98   CO2 22   BUN 27.1*   CR 1.35*   ANIONGAP 15   GFRESTIMATED 51*   LLOYD 8.7*   *     Blood Glucose:  Recent Labs   Lab 04/17/24  1544   *       Lactic Acid:    Lab Results   Component Value Date    LACT 1.9 04/17/2024    LACT 2.2 12/25/2023    LACT 1.0 11/20/2020    LACT 2.2 11/20/2020      Troponin:     Latest Reference Range & Units 04/17/24 15:44 04/17/24 19:22   Troponin T, High Sensitivity <=22 ng/L 39 (H) 38 (H)   (H): Data is abnormally high     Latest Reference Range & Units 04/17/24 15:44   N-Terminal Pro BNP Inpatient 0 - 1,800 pg/mL 5,202 (H)   (H): Data is abnormally high   Latest Reference Range & Units 04/17/24 15:44   Procalcitonin <0.50 ng/mL 1.06 (H)   (H): Data is abnormally high          Imaging:      I personally reviewed and interpreted the XR radiograph imaging, and agree w main radiology interpretation as noted below     XR Pelvis 1/2 Views4/17/2024  IMPRESSION: Acute appearing highly comminuted and displaced intertrochanteric fracture of the proximal left femur. Status post intramedullary sunil and screw fixation; limited evaluation due to positioning. The distal screw head sits 8 mm proud from the cortical surface. There  is normal joint alignment. Osteopenia. Vascular calcification.     XR Femur Left 2 Views: 4/17/2024  IMPRESSION: Acute appearing highly comminuted and displaced intertrochanteric fracture of the proximal left femur. Status post intramedullary sunil and screw fixation; limited evaluation due to positioning. The distal screw head sits 8 mm proud from the cortical surface. There is normal joint alignment. Osteopenia. Vascular calcification.     Chest XR,  PA & LAT: 4/17/2024  IMPRESSION: Patient's rotated to the left. Cardiomegaly and slight pulmonary vascular congestion is unchanged. Haziness to the left chest with blunting of the left costophrenic angle is unchanged and reflects posttraumatic changes of the pleura. Old right lateral seventh rib fracture as well.      PTA imaging:  XR CHEST 1 VIEW: 4/15/2024  Findings/Impression: Cardiovascular and mediastinum: Heart size and vasculature are normal in caliber and appearance. Lungs and pleural space: Persistent trace left basilar pleural effusion. No convincing evidence for pneumonia. Moderate central pulmonary vascular congestion is suggested without overt edema. No pneumothorax. Bones and soft tissues: No acute findings. Dictated by Mulugeta Rutledge MD @ 4/15/2024 10:12:40 PM Electronically Signed           ECG Interpretation:     None today    ------------------------- PAST 24 HR DATA REVIEWED -----------------------------------------------    I have personally reviewed the following data over the past 24 hrs:    20.8 (H)  \   8.2 (L)   / 312     135 98 27.1 (H) /  253 (H)   4.8 22 1.35 (H) \     Trop: 38 (H) BNP: 5,202 (H)     Procal: 1.06 (H) CRP: N/A Lactic Acid: 1.9           Time - I spent 75 minutes on the above w greater than 50% spent face to face counseling and including documentation and coordination of care    Pt was staffed and discussed in detail w Dr Brown, attending provider.The above assessment and plan is the product of our joint decision making.    The  above form was partially completed using Dragon voice dictation software.  At times inadvertent word substitutions or word omissions may occur, not seen on proof read. Should any part of the documentation be unclear or otherwise contradictory, reader is encouraged to please contact me for clarification.

## 2024-04-18 NOTE — PROGRESS NOTES
SW: Writer informed that [facility] had called indicating that they were unsure if patient would have a bed hold. Writer returned call and left contact information requesting a call back for update.    JERE Cárdenas

## 2024-04-18 NOTE — ED NOTES
"Daughter Camille has been updated again.  She reports that the patients mood and memory has been \"up and down\" and he has days where he does seem more confused.  She was updated that tonight her dad is pretty upset tonight and is having periods of delirium.   "

## 2024-04-19 NOTE — PROGRESS NOTES
Called re: low BS.   AM blood sugars in the 180s, low sugar after 6 units novolog at lunch.   - Changed AM sliding scale insulin to low sliding scale insulin  - Changed prandial insulin to 4 units with meals.   - Continue lantus at 8 units. Hold and call MD if AM BS under 100.

## 2024-04-19 NOTE — PROGRESS NOTES
"[facility]    Hospitalist Progress Note    Date of Service (when I saw the patient): 04/19/2024  Admit date: 4/17/2024    Interval History   Full details of events over last 24 hours outlined below.   Afebrile hemodynamically stable with elevated blood pressure 166/89.  This appears isolated.  O2 97% on 3 L  He has not complaints, stating he feels \"great, the best I have felt in a while.\" No pain in hip currently, no SOB, chest pressure or increased swelling.    Assessment & Plan   Tc Garcia is a 84 year old male , with a PMH of HFpEF, CKD, A-fib on AC, DM type I, diabetic nephropathy, HTN, pulmonary HTN, recent intracranial hemorrhage in 12/2023, recent L hip fracture, s/p IM nailing on 3/23/24 who was admitted on 4/17/2024 from TCU w SOB and leg pain and swelling after an unwitnessed fall.     At presentation Afeb, P 86, /68 O2 90% on RA.   Na 135, K 4.7, Cr 1.43 (baseline ~ 1.4) , glucose 253, BNP 5202. Procal 1.06  Troponin 39, 38.   WBC 20.8, hgb 8.2 (baseline ~ 9), plts 312  UA clear.   Covid/flu/RSV negative.     CXR personally reviewed, rotated to left there is pulmonary vascular congestion, seen previously. Left lower lung shows haziness and blunting of the costophrenic angle which is unchanged from prior films, likely chronic post-op changes.      Fall, with left hip fracture  S/p L femur fx 3/23 repair  S/p left hip IM nail 3/23/24 at [place] Presents from TCU w reported fall, SOB/, leg pain. XR w new left hip fracture,.  Reportedly fell today though he is adamantly denying that.  (patient confused).  Ortho consult > plan for repair on 4/23 due to lack of OR availability.  Will remain here awaiting surgery as long as remains comfortable.  On ASA BID PTA for DVT ppx > changed to daily prior to surgery, defer increase post-op for DVT ppx to ortho   APAP TID  Oxycodone 1st PRN.     Left leg swelling  U/S : No evidence of deep venous thrombosis.  Complex fluid   collection " in the left groin. This most likely represents a hematoma.      Chronic normocytic anemia  Possible vomiting blood 4/17  Iron deficiency (See iron indices below)   Per report from daughter from the TCU.  Patient denies any abdominal symptoms and is not vomiting but will watch very carefully for that  With recent hip fx.Last baseline hemoglobin 4/15 7.8 with an MCV 90.6.   On arrival he is 8.2, with shortness of breath which is more likely possibly due to volume overload vs anemia.  * S/p 1 unit preop to get Hgb > 8 per ortho's recommendation.     Recent Labs   Lab 04/19/24  0801 04/18/24  1705 04/18/24  0733 04/17/24  1544   HGB 9.0* 8.1* 7.7* 8.2*     Monitor for signs of GI bleeding  Iron deficient (see below)   Venofer 200 mg daily x 5 days ordered on 04/19/24.      Recent Labs   Lab 04/19/24  0801 04/18/24  1705 04/18/24  0733   WBC  --   --  13.5*   HGB 9.0*   < > 7.7*   HCT  --   --  25.4*   MCV  --   --  90   PLT  --   --  286   IRON  --   --  14*   IRONSAT  --   --  6*   FEB  --   --  235*    < > = values in this interval not displayed.       Acute respiratory failure with hypoxia   HFpEF, Suspected volume overload  Elevated troponin, likely demand ischemia  Mild pulmonary HTN  PTA Lasix 20 (recent incr). SOB at presentation. CXR shows chronic pulmonary vascular congestion and changes in left chest.  BNP 5000, last BNP 8120 on 4/15 (being seen for chest pain in the ED, they felt he likely was volume overloaded and increased his diuretic to 20 daily.).    * S/p Lasix 20 IV in ED.    * Do not suspect ACS, acute ischemia.     Recent Labs   Lab 04/17/24  1922 04/17/24  1544   NTBNPI  --  5,202*   CTROPT 38* 39*     EF > 70%, flattened septum consistent with volume overload.  Mildly decreased RV systolic function.  Severe biatrial enlargement.  Moderate to severe TR with dilation of the IVC and abnormal respiratory variation.  And severe pulmonary hypertension.  R heart cath on 4/18/24 indicated just mild  pulmonary HTN, with elevated left sided filling pressures and wedge of 21, c/w some degree of fluid overload.   Start torsemide 5 mg daily on 04/19/24. (Discussed with cardiology, recommended gentle diuresis for high wedge.)   O2 as needed sats below 90% > wean O2  Continue PTA carvedilol 6.25 twice daily   Continue PTA amlodipine 5 QD   Continue PTA isosorbide 60 QD     Questionable CAP, elevated Pro-Hai  Low suspicion  * Noted to have elevated Pro-Hai and a white count of 20, though could be demargination secondary to pain/fracture.  Afebrile.  CXR favors old opacity without clear infiltrate.  But with the Pro-Hai think it is possible he has a community-acquired pneumonia.    Continued on Rocephin 1 g every 24 hours for short coarse of 5 days. (Last dose 4/21)     Recent Labs   Lab 04/19/24  0801 04/18/24  1705 04/18/24  0733 04/17/24  1544   WBC  --   --  13.5* 20.8*   HGB 9.0* 8.1* 7.7* 8.2*   PLT  --   --  286 312        A-fib on AC  Holding apixaban since bleed. Neurosurgery consulted.   PTA rate control with coreg  Given his multiple falls he is being evaluated for watchman's procedure.  Will need clearance for anticoagulation prior to this.     CKD  3b, hx nephrotic proteinuria  Last baseline creatinine 1.3 on 4/15/2024, although on chart review appears 1.6.    He is at 1.3 at this time.    Note Cr up on 04/19/24 but his baseline is varies around 1.6.   Started torsemide 5 mg as above, follow daily Cr.   Recent Labs   Lab 04/19/24  0801 04/18/24  0733 04/17/24  1544   CR 1.63* 1.43* 1.35*       DM w hyperglycemia, A1c 9 on 3/20/2024  * Unclear if type I or type II, both listed in notes. Diagnosed in his 60s but on minimal insulin and not PO medications, consistent with type I.   >250 on admit, ate tonight, resume lantus   * On lantus 8 units with humolog 4 units TID.     Did not receive bedtime lantus 8 units last night, BS up in the 300s, bicarb, AG normal.   Continue PTA lantus 8 units, novolog 4 units  with meals.     Recent Labs   Lab 04/19/24  0801 04/19/24  0743 04/19/24  0238 04/18/24  2223 04/18/24  1732 04/18/24  1156   * 181* 115* 111* 90 239*        BPH  PTA flomax resumed     History [facility] 12/2023  History of completed stroke  Updated CTH without current bleed: CT head with small chronic lacunar infarct right caudate.  Moderate to advanced volume loss could be consistent with suspected PTA dementia also  May resume DOAC post surgery, Cleared by NSG > However, high fall risk so will have to discuss with family.   Cardiology is considering Watchman.     Acute metabolic encephalopathy  Previous cognitive declines  Mood disorder, with insomina  * Daughter notes that patient is at cognitive baseline, history of at times paranoia delusions.  Last slums 17/30.  No formal dementia diagnosis but in DDx. Past stroke as above.  History of alcohol abuse possible contributes.   Continue PTA mirtazapine 15 HS  PRN Roserem on hold around pain medications.   OT recheck cog eval postop  On 04/19/24, he does not recall his fall, but is alert and oriented, knows why he is in the hospital and answering questions appropriately.   Frequent re-orientation  Maintain normal day/night, sleep wake cycles  Minimize sedating/altering medications as able  Treat separate conditions as detailed above/below  Schedule melatonin at bedtime   Quetiapine PRN for insomnia and agitation at night only.     Recent C7 and T12 compression fracture with cervical canal stenosis on MRI during Februray  admission for left hip fracture.   Discharged on c-collar to be warn at all times until follow up x-rays and neurosurgical follow up   He is due for this.   Appreciate neurosurgery evaluation by Shiela Warren PA-C on 4/18/24   Given he is having no neck pain, no further imaging or intervention.     History of Traumatic ICH in 12/2023  Remains off anticoagulation  CT head no bleed at admission.   Appreciate neurosurgery evaluation by Shiela Warren  KAMILLA on 4/18/24   We may resume anticoagulation following surgery.      Goals of care  Remains DNR/DNI.  Discussed goals with patient, with daughter and wife present.  Patient wants to continue to get restorative care.  He is not comfort cares.  He is enjoying his life.    Family agrees with his decision.     Clinically Significant Risk Factors               # Coagulation Defect: INR = 1.25 (Ref range: 0.85 - 1.15) and/or PTT = N/A, will monitor for bleeding    # Hypertension: Noted on problem list  # Acute heart failure with preserved ejection fraction: heart failure noted on problem list, last echo with EF >50%, and receiving IV diuretics      # DMII: A1C = 9.0 % (Ref range: <5.7 %) within past 6 months       # Financial/Environmental Concerns:             Diet: Orders Placed This Encounter      Combination Diet Moderate Consistent Carb (60 g CHO per Meal) Diet; 2 gm NA Diet     IVF: None  DVT Prophylaxis: ASA daily, per ortho  Pruett Catheter: Not present    No CPR- Do NOT Intubate  Disposition:  Anticipate discharge TBD  PT/OT pending post surgery   Communication: Discussed with cardiology, RN, wife, daughter and patient on 04/18/24    Mayra Fonseca MD    Hospitalist Service  [facility]  Securely message with the Vocera Web Console (learn more here)  Text page via [employer] Paging/Directory      -Data reviewed today: I reviewed all new labs and imaging results over the last 24 hours. I personally reviewed the EKG tracing showing as above  and the chest x-ray image(s) showing as above .    Physical Exam   Temp: 97.7  F (36.5  C) Temp src: Oral BP: (!) 166/89 Pulse: 74   Resp: 16 SpO2: 97 % O2 Device: Nasal cannula Oxygen Delivery: 3 LPM  Vitals:    04/17/24 1533 04/18/24 0712 04/19/24 0705   Weight: 72.6 kg (160 lb) 63.7 kg (140 lb 6.9 oz) 72 kg (158 lb 11.7 oz)     Vital Signs with Ranges  Temp:  [97.7  F (36.5  C)-98.4  F (36.9  C)] 97.7  F (36.5  C)  Pulse:  [55-74] 74  Resp:  [16]  16  BP: (111-166)/(54-89) 166/89  SpO2:  [93 %-99 %] 97 %  I/O last 3 completed shifts:  In: 300   Out: 1150 [Urine:1150]    Today's Exam  Constitutional:  NAD,   Neuropsyche:  alert and oriented, answers questions appropriately.   Respiratory:  Breathing comfortably, decreased air exchange, no wheezes, no crackles.   Cardiovascular:  Regular rate and rhythm with low-pitched systolic murmur, trace edema on RLE 2+ edema LLE. No edema on RLE  GI:  soft, NT/ND, BS normal  Skin/Integumen:  Scattered bruising and excoriations. No acute rash.     Medications   All medications reviewed on 04/19/24  Current Facility-Administered Medications   Medication Dose Route Frequency Provider Last Rate Last Admin     Current Facility-Administered Medications   Medication Dose Route Frequency Provider Last Rate Last Admin    acetaminophen (TYLENOL) tablet 1,000 mg  1,000 mg Oral TID Jeremi Martinez PA-C   1,000 mg at 04/19/24 0815    amLODIPine (NORVASC) tablet 5 mg  5 mg Oral Daily Jeremi Martinez PA-C   5 mg at 04/19/24 0816    artificial saliva (BIOTENE MT) solution 1 spray  1 spray Mouth/Throat 4x Daily Jeremi Martinez PA-C   1 spray at 04/19/24 0816    aspirin EC tablet 81 mg  81 mg Oral Daily Mayra Fonseca MD   81 mg at 04/19/24 0816    carvedilol (COREG) tablet 6.25 mg  6.25 mg Oral BID w/meals Jeremi Martinez PA-C   6.25 mg at 04/19/24 0816    cefTRIAXone (ROCEPHIN) 1 g vial to attach to  mL bag for ADULTS or NS 50 mL bag for PEDS  1 g Intravenous Q24H Jeremi Martinez PA-C   1 g at 04/18/24 2007    [Held by provider] furosemide (LASIX) tablet 20 mg  20 mg Oral Daily Jeremi Martinez PA-C        insulin aspart (NovoLOG) injection (RAPID ACTING)  4 Units Subcutaneous TID AC Jeremi Martinez PA-C   4 Units at 04/19/24 0817    insulin aspart (NovoLOG) injection (RAPID ACTING)  1-10 Units Subcutaneous TID AC Jeremi Martinez PA-C   2 Units at 04/19/24 0817    insulin aspart (NovoLOG) injection  (RAPID ACTING)  1-7 Units Subcutaneous At Bedtime Jeremi Martinez PA-C        insulin glargine (LANTUS PEN) injection 8 Units  8 Units Subcutaneous QAM AC Mayra Fonseca MD   8 Units at 04/19/24 0816    isosorbide mononitrate (IMDUR) 24 hr tablet 60 mg  60 mg Oral QPM Mayra Fonseca MD   60 mg at 04/19/24 0816    mirtazapine (REMERON) tablet 15 mg  15 mg Oral At Bedtime Jeremi Martinez PA-C   15 mg at 04/18/24 2217    polyethylene glycol (MIRALAX) Packet 17 g  17 g Oral Daily Jeremi Martinez PA-C   17 g at 04/19/24 0816    sodium chloride (PF) 0.9% PF flush 3 mL  3 mL Intracatheter Q8H Jeremi Martinez PA-C   3 mL at 04/19/24 0821    tamsulosin (FLOMAX) capsule 0.4 mg  0.4 mg Oral At Bedtime Jeremi Martinez PA-C   0.4 mg at 04/18/24 2217    torsemide (DEMADEX) tablet 5 mg  5 mg Oral Daily Mayra Fonseca MD         PRN Meds:   Current Facility-Administered Medications   Medication Dose Route Frequency Provider Last Rate Last Admin    acetaminophen (TYLENOL) tablet 650 mg  650 mg Oral Q4H PRN Jeremi Martinez PA-C   650 mg at 04/17/24 2338    Or    acetaminophen (TYLENOL) Suppository 650 mg  650 mg Rectal Q4H PRN Jeremi Martinez PA-C        calcium carbonate (TUMS) chewable tablet 1,000 mg  1,000 mg Oral 4x Daily PRN Jeremi Martinez PA-C        glucose gel 15-30 g  15-30 g Oral Q15 Min PRN Jeremi Martniez PA-C        Or    dextrose 50 % injection 25-50 mL  25-50 mL Intravenous Q15 Min PRN Jeremi Martinez PA-C        Or    glucagon injection 1 mg  1 mg Subcutaneous Q15 Min PRN Jeremi Martinez PA-C        furosemide (LASIX) injection 40 mg  40 mg Intravenous Once PRN Mayra Fonseca MD        HOLD: ALL Anticoagulant medications until AFTER surgery   Does not apply HOLD Jeremi Martinez PA-C        hydrALAZINE (APRESOLINE) tablet 10 mg  10 mg Oral Q4H PRN Jeremi Martinez PA-C        Or    hydrALAZINE (APRESOLINE) injection 10 mg  10 mg Intravenous Q4H PRN Michelle  Jeremi MCKENZIE PA-C        lidocaine (LMX4) cream   Topical Q1H PRN Jeremi Martinez PA-C        lidocaine 1 % 0.1-1 mL  0.1-1 mL Other Q1H PRN Jeremi Martinez PA-C        naloxone (NARCAN) injection 0.2 mg  0.2 mg Intravenous Q2 Min PRN Mayra Fonseca MD        Or    naloxone (NARCAN) injection 0.4 mg  0.4 mg Intravenous Q2 Min PRN Mayra Fonseca MD        Or    naloxone (NARCAN) injection 0.2 mg  0.2 mg Intramuscular Q2 Min PRN Mayra Fonseca MD        Or    naloxone (NARCAN) injection 0.4 mg  0.4 mg Intramuscular Q2 Min PRN Mayra Fonseca MD        ondansetron (ZOFRAN ODT) ODT tab 4 mg  4 mg Oral Q6H PRN Jeremi Martinez PA-C        Or    ondansetron (ZOFRAN) injection 4 mg  4 mg Intravenous Q6H PRN Jeremi Martinez PA-C        oxyCODONE (ROXICODONE) tablet 5 mg  5 mg Oral Q4H PRN Jeremi Martinez PA-C   5 mg at 04/18/24 1421    oxyCODONE IR (ROXICODONE) half-tab 2.5 mg  2.5 mg Oral Q4H PRN Jeremi Martinez PA-C   2.5 mg at 04/18/24 2007    prochlorperazine (COMPAZINE) injection 5 mg  5 mg Intravenous Q6H PRN Jeremi Martinez PA-C        Or    prochlorperazine (COMPAZINE) tablet 5 mg  5 mg Oral Q6H PRN Jeremi Martinez PA-C        Or    prochlorperazine (COMPAZINE) suppository 12.5 mg  12.5 mg Rectal Q12H PRN Jeremi Martinez PA-C        QUEtiapine (SEROquel) tablet 25 mg  25 mg Oral Bedtime PRN may repeat x1 Mayra Fonseca MD        [Held by provider] ramelteon (ROZEREM) tablet 8 mg  8 mg Oral At Bedtime PRN Jeremi Martinez PA-C        senna-docusate (SENOKOT-S/PERICOLACE) 8.6-50 MG per tablet 1 tablet  1 tablet Oral BID PRN Jeremi Martinez PA-C        Or    senna-docusate (SENOKOT-S/PERICOLACE) 8.6-50 MG per tablet 2 tablet  2 tablet Oral BID PRN Jeremi Martinez PA-C        sodium chloride (PF) 0.9% PF flush 3 mL  3 mL Intracatheter q1 min prn Jeremi Martinez PA-C           Data   Recent Labs   Lab 04/19/24  0801 04/19/24  0743 04/19/24  0238 04/18/24  7778  04/18/24  1705 04/18/24  0812 04/18/24  0733 04/18/24  0106 04/17/24  1544   WBC  --   --   --   --   --   --  13.5*  --  20.8*   HGB 9.0*  --   --   --  8.1*  --  7.7*  --  8.2*   MCV  --   --   --   --   --   --  90  --  90   PLT  --   --   --   --   --   --  286  --  312   INR  --   --   --   --   --   --  1.25*  --   --      --   --   --   --   --  135  --  135   POTASSIUM 4.4  --   --   --   --   --  4.7  --  4.8   CHLORIDE 100  --   --   --   --   --  98  --  98   CO2 28  --   --   --   --   --  25  --  22   BUN 31.1*  --   --   --   --   --  29.2*  --  27.1*   CR 1.63*  --   --   --   --   --  1.43*  --  1.35*   ANIONGAP 11  --   --   --   --   --  12  --  15   LLOYD 8.9  --   --   --   --   --  8.6*  --  8.7*   * 181* 115*   < >  --    < > 389*   < > 253*    < > = values in this interval not displayed.       Recent Results (from the past 24 hour(s))   Cardiac Catheterization    Narrative      Right sided filling pressures are normal.    Left sided filling pressures are moderately elevated.    Mild elevated pulmonary hypertension.    Normal cardiac output level.    Mild to moderate pulmonary hypertension with moderately elevated left side   filling pressure and normal RA pressure.  RA mean 6mmHg  RV 53/6mmHg  PA 47/15, mean 26mmHg  PCWP mean 21mmHg

## 2024-04-19 NOTE — PROGRESS NOTES
Right heart cath reviewed.  The patient's pulm hypertension is only mild.  Right-sided filling pressures are normal.  Left-sided filling pressures are moderately elevated (wedge 21 mmHg).  Gentle diuresis pre and postsurgery as necessary.  Will sign off.

## 2024-04-19 NOTE — PROVIDER NOTIFICATION
Blood sugar 48 oral Glucose and apple juice given. Recheck, 71 and 118.Notified hospitalist with with above.

## 2024-04-19 NOTE — PROGRESS NOTES
Diagnosis: LEFT HIP FX   POD#: NA possible surgeyr Tuesday   Mental Status: A&Ox2-3   Activity/dangle bedrest   Diet: mod carb, 2g Na   Pain: oxy, scheduled tylenol   Pruett/Voiding: external cath in place   Tele/Restraints/Iso: tele afib   02/LDA: 3L NC, PADILLA  D/C Date: TBD surgery   Other Info: scattered bruising to skin, numbness present to BLE, 2+ edema to left leg, Blood sugar checks last one 115 at 0200

## 2024-04-19 NOTE — PROGRESS NOTES
VSS, CMS intact. Pain managed with tylenol. Blanchable redness coccyx, mepelix applied. Skin tears left forearm. External cath in placed for incontinent.  Received Iron sucrose, tolerated fine. A&OX2-3. Plan to have hip surgery on Tuesday. Repositioned every 2 hours.

## 2024-04-19 NOTE — PROGRESS NOTES
Discussed with Ortho.  Surgery will be on 4/23 when surgeon is available.   Reviewed R heart cath results. C/w heart failure, fluid overload.       Right sided filling pressures are normal.    Left sided filling pressures are moderately elevated.    Mild elevated pulmonary hypertension.    Normal cardiac output level.     Mild to moderate pulmonary hypertension with moderately elevated left side filling pressure and normal RA pressure.  RA mean 6mmHg  RV 53/6mmHg  PA 47/15, mean 26mmHg  PCWP mean 21 mmHg      Discussed with nurse.  Currently breathing comfortably.  Feels well.    PLAN  Given he is clinically stable, I will hold on scheduled Lasix late in the afternoon.  He has known memory deficits, and I want to avoid nocturia and poor sleep leading to delirium.  I have Lasix 40 mg IV x 1 PRN ordered for SOB increasing oxygen needs.  Discussed with RN.

## 2024-04-19 NOTE — PLAN OF CARE
Goal Outcome Evaluation: Reviewed chart and discussed with nurse. Patient on strict bedrest until surgery on Tuesday 4/23. Will complete PT/OT orders. Please put in new orders when appropriate after surgery.

## 2024-04-19 NOTE — PROGRESS NOTES
Orthopedic Surgery  Tc Garcia  04/19/2024     Admit Date:  4/17/2024    Acute, closed, comminuted, displaced left periprosthetic proximal femur fracture through the intertrochanteric region. Candidate for joint revision, conversion to ANAI.      Patient resting comfortably in bed.  Pain controlled at rest.   Tolerating oral intake.  Denies nausea or vomiting  Denies chest pain or shortness of breath  Due to cardiology procedure yesterday, planning on conversion to ANAI on Tuesday 4/23/2024    Temp:  [97.7  F (36.5  C)-98.4  F (36.9  C)] 97.7  F (36.5  C)  Pulse:  [55-74] 74  Resp:  [16] 16  BP: (111-166)/(54-89) 166/89  SpO2:  [93 %-99 %] 97 %    Alert and oriented. 2.5 L O2 per nasal cannula   Minimal erythema of the surrounding skin.   Bilateral calves are soft, non-tender.  Left lower extremity is NVI.  Sensation intact bilateral lower extremities  Patient able to resist dorsi and plantar flexion bilaterally  +Dp pulse    Labs:  Recent Labs   Lab Test 04/19/24  0801 04/18/24  1705 04/18/24  0733 04/17/24  1544 03/20/24  0953 03/18/24  1206   WBC  --   --  13.5* 20.8*  --  10.2   HGB 9.0* 8.1* 7.7* 8.2*   < > 9.5*   PLT  --   --  286 312  --  280    < > = values in this interval not displayed.     Recent Labs   Lab Test 04/18/24  0733 12/11/23  1419 01/15/23  1321   INR 1.25* 1.35* 1.13         1. PLAN:   Continue DVT prophylaxis per primary team or cardiology recommendations.    Mobilize with PT/OT    NWB   Continue current pain regimen      2. Disposition   Pending progress    Sejal Lovett PA-C

## 2024-04-20 NOTE — PROGRESS NOTES
"[facility]    Hospitalist Progress Note    Date of Service (when I saw the patient): 04/20/2024  Admit date: 4/17/2024    Interval History   Full details of events over last 24 hours outlined below.   .First name has been afebrile hemodynamically stable.  Blood pressure up a bit in the 150s.  O2 97% on 2 L.  No hypoxia documented.  He was started on torsemide 5 mg yesterday and creatinine is stable at 1.5 this morning.  He had excellent diuresis with 2500 cc of urine output.  Weight is down.   He denies any SOB or pain but states he is terrible because surgery is not until next week and he is bored. States left leg feels \"tired\"   Note he had one dose of oxycodone 2.5 mg today. Otherwise no other recent use.     Assessment & Plan   Tc Garcia is a 84 year old male , with a PMH of HFpEF, CKD, A-fib on AC, DM type I, diabetic nephropathy, HTN, pulmonary HTN, recent intracranial hemorrhage in 12/2023, recent L hip fracture, s/p IM nailing on 3/23/24 who was admitted on 4/17/2024 from TCU w SOB and leg pain and swelling after an unwitnessed fall.     At presentation Afeb, P 86, /68 O2 90% on RA.   Na 135, K 4.7, Cr 1.43 (baseline ~ 1.4) , glucose 253, BNP 5202. Procal 1.06  Troponin 39, 38.   WBC 20.8, hgb 8.2 (baseline ~ 9), plts 312  UA clear.   Covid/flu/RSV negative.     CXR personally reviewed, rotated to left there is pulmonary vascular congestion, seen previously. Left lower lung shows haziness and blunting of the costophrenic angle which is unchanged from prior films, likely chronic post-op changes.      Fall, with left hip fracture  S/p L femur fx 3/23 repair  S/p left hip IM nail 3/23/24 at [facility] Presents from TCU w reported fall, SOB/, leg pain. XR w new left hip fracture,.  Reportedly fell today though he is adamantly denying that.  (patient confused).  Ortho consult > plan for repair on 4/23 due to lack of OR availability.  Will remain here awaiting surgery as long as " remains comfortable.  On ASA daily.   Given his immobility prior to surgery, I started lovenox 40 mg subcutaneous daily on 04/20/24. (Discussed with ortho on 04/20/24.)   Defer holding lovenox to surgical team given unclear if surgery is going to occur on 4/23.   APAP TID  Oxycodone 1st PRN.     Left leg swelling  U/S : No evidence of deep venous thrombosis.  Complex fluid   collection in the left groin. This most likely represents a hematoma.      Chronic normocytic anemia  Possible vomiting blood 4/17  Iron deficiency (See iron indices below)   Per report from daughter from the TCU.  Patient denies any abdominal symptoms and is not vomiting but will watch very carefully for that  With recent hip fx.Last baseline hemoglobin 4/15 7.8 with an MCV 90.6.   On arrival he is 8.2, with shortness of breath which is more likely possibly due to volume overload vs anemia.  * S/p 1 unit preop to get Hgb > 8 on 4/18 per ortho's recommendation. Wanted hgb > 8 prior to surgery.     Recent Labs   Lab 04/19/24  0801 04/18/24  1705 04/18/24  0733 04/17/24  1544   HGB 9.0* 8.1* 7.7* 8.2*     Monitor for signs of GI bleeding  Iron deficient (see labs below)   Venofer 200 mg daily x 5 days ordered on 04/19/24.      Recent Labs   Lab 04/19/24  0801 04/18/24  1705 04/18/24  0733   WBC  --   --  13.5*   HGB 9.0*   < > 7.7*   HCT  --   --  25.4*   MCV  --   --  90   PLT  --   --  286   IRON  --   --  14*   IRONSAT  --   --  6*   FEB  --   --  235*   ERNESTO  --   --  177    < > = values in this interval not displayed.       Acute respiratory failure with hypoxia   HFpEF, Suspected volume overload  Elevated troponin, likely demand ischemia  Mild pulmonary HTN  PTA Lasix 20 (recent incr). SOB at presentation. CXR shows chronic pulmonary vascular congestion and changes in left chest.  BNP 5000, last BNP 8120 on 4/15 (being seen for chest pain in the ED, they felt he likely was volume overloaded and increased his diuretic to 20 daily.).    * S/p  Lasix 20 IV in ED.    * Do not suspect ACS, acute ischemia.     Recent Labs   Lab 04/17/24  1922 04/17/24  1544   NTBNPI  --  5,202*   CTROPT 38* 39*     EF > 70%, flattened septum consistent with volume overload.  Mildly decreased RV systolic function.  Severe biatrial enlargement.  Moderate to severe TR with dilation of the IVC and abnormal respiratory variation.  And severe pulmonary hypertension.  R heart cath on 4/18/24 indicated just mild pulmonary HTN, with elevated left sided filling pressures and wedge of 21, c/w some degree of fluid overload.   Started torsemide 5 mg daily on 04/19/24. (Discussed with cardiology, recommended gentle diuresis for high wedge.)  BMP daily initially.    O2 as needed sats below 90% > 93% on RA when awake on 04/20/24, desaturates with sleep.   Continue PTA carvedilol 6.25 twice daily   Continue PTA amlodipine 5 QD   Continue PTA isosorbide 60 every day    Vitals:    04/17/24 1533 04/18/24 0712 04/19/24 0705 04/20/24 0700   Weight: 72.6 kg (160 lb) 63.7 kg (140 lb 6.9 oz) 72 kg (158 lb 11.7 oz) 65.8 kg (145 lb 1 oz)     I/O last 3 completed shifts:  In: -   Out: 2400 [Urine:2400]  Recent Labs   Lab 04/20/24  0751 04/19/24  0801 04/18/24  0733 04/17/24  1544   CO2 27 28 25 22    139 135 135   POTASSIUM 4.3 4.4 4.7 4.8   BUN 28.6* 31.1* 29.2* 27.1*   CR 1.50* 1.63* 1.43* 1.35*          Questionable CAP, elevated Pro-Hai  Low suspicion  * Noted to have elevated Pro-Hai and a white count of 20, though could be demargination secondary to pain/fracture.  Afebrile.  CXR favors old opacity without clear infiltrate.  But with the Pro-Hai think it is possible he has a community-acquired pneumonia.    Continued on Rocephin 1 g every 24 hours for short coarse of 5 days. (Last dose 4/21)     Recent Labs   Lab 04/19/24  0801 04/18/24  1705 04/18/24  0733 04/17/24  1544   WBC  --   --  13.5* 20.8*   HGB 9.0* 8.1* 7.7* 8.2*   PLT  --   --  286 312        A-fib on AC  PTA rate control with  coreg  Given his multiple falls he is being evaluated for Watchman's procedure.   Per neurosurgery consult on 4/18, may resume anticoagulation at this time.     CKD  3b, hx nephrotic proteinuria  Last baseline creatinine 1.3 on 4/15/2024, although on chart review appears 1.6.    He is at 1.3 at this time.    Note Cr up on 04/19/24 but his baseline is varies around 1.6.   Started torsemide 5 mg daily on 4/19, follow daily Cr.     Recent Labs   Lab 04/20/24  0751 04/19/24  0801 04/18/24  0733 04/17/24  1544   CR 1.50* 1.63* 1.43* 1.35*       DM w hyperglycemia, A1c 9 on 3/20/2024  Hypoglycemia on 04/20/24  * Unclear if type I or type II, both listed in notes. Diagnosed in his 60s but on minimal insulin and not PO medications, consistent with type I.   >250 on admit, ate tonight, resume lantus   * On lantus 8 units with humolog 4 units TID.     Did not receive bedtime lantus 8 units last night, BS up in the 300s, bicarb, AG normal.   Continue PTA lantus 8 units, novolog 4 units with meals.   He had low BS of 48 on 4/19/24 prior to supper. He was on high sliding scale insulin.   Changed to 3 units with meals and low sliding scale insulin   Now BS very elevated. Note he received no insulin coverage yesterday evening for elevated BS.   Increase scheduled prandial novolog back to 4 units with meals and increase to mod sliding scale insulin.     Recent Labs   Lab 04/20/24  1230 04/20/24  0851 04/20/24  0751 04/20/24  0618 04/20/24  0155 04/19/24  2216   * 332* 339* 295* 152* 207*        BPH  PTA flomax resumed     History of Traumatic ICH in 12/2023  History of completed stroke  * Admission [facility] without current bleed: CT head with small chronic lacunar infarct right caudate.  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   May resume DOAC post surgery, Cleared by NSG > However, high fall risk so will have to discuss with family.   Cardiology is considering Watchman.     Acute  metabolic encephalopathy, RESOLVED  Previous cognitive decline  Mood disorder, with insomina  * Daughter notes that patient is at cognitive baseline, history of at times paranoia delusions.  Last slums 17/30.  No formal dementia diagnosis but in DDx. Past stroke as above.  History of alcohol abuse possible contributes.   Continue PTA mirtazapine 15 HS  PRN Roserem on hold around pain medications.   Since HD# 1 patient has been alert and oriented.   Maintain normal day/night, sleep wake cycles  Minimize sedating/altering medications as able  Quetiapine PRN for insomnia and agitation at night only.     Recent C7 and T12 compression fracture with cervical canal stenosis on MRI during Februray  admission for left hip fracture.   Discharged on c-collar to be worn at all times until follow up x-rays and neurosurgical follow up, which was never done. No longer wearing c-collar.   Appreciate neurosurgery evaluation by Shiela Warren PA-C on 4/18/24   Given he is having no neck pain, no further imaging or intervention.      Goals of care  Remains DNR/DNI.  Discussed goals with patient, with daughter and wife present.  Patient wants to continue to get restorative care.  He is not comfort cares.  He is enjoying his life.    Family agrees with his decision.     Clinically Significant Risk Factors               # Coagulation Defect: INR = 1.25 (Ref range: 0.85 - 1.15) and/or PTT = N/A, will monitor for bleeding    # Hypertension: Noted on problem list  # Acute heart failure with preserved ejection fraction: heart failure noted on problem list, last echo with EF >50%, and receiving IV diuretics      # DMII: A1C = 9.0 % (Ref range: <5.7 %) within past 6 months       # Financial/Environmental Concerns:             Diet: Orders Placed This Encounter      Combination Diet Moderate Consistent Carb (60 g CHO per Meal) Diet; 2 gm NA Diet     IVF: None  DVT Prophylaxis: ASA daily, per ortho  Pruett Catheter: Not present    No CPR- Do NOT  Intubate  Disposition:  Anticipate discharge TBD  PT/OT pending post surgery   Communication: Discussed with cardiology, RN, wife, daughter and patient on 04/18/24    Mayra Fonseca MD    Hospitalist Service  [facility]  Securely message with the Vocera Web Console (learn more here)  Text page via [facility] Paging/Directory      -Data reviewed today: I reviewed all new labs and imaging results over the last 24 hours. I personally reviewed no images or EKG's today.    Physical Exam   Temp: 97.8  F (36.6  C) Temp src: Axillary BP: (!) 153/95 Pulse: 84   Resp: 16 SpO2: 97 % O2 Device: Nasal cannula Oxygen Delivery: 2 LPM  Vitals:    04/18/24 0712 04/19/24 0705 04/20/24 0700   Weight: 63.7 kg (140 lb 6.9 oz) 72 kg (158 lb 11.7 oz) 65.8 kg (145 lb 1 oz)     Vital Signs with Ranges  Temp:  [97.6  F (36.4  C)-98.1  F (36.7  C)] 97.8  F (36.6  C)  Pulse:  [59-84] 84  Resp:  [16-18] 16  BP: (125-167)/() 153/95  SpO2:  [95 %-97 %] 97 %  I/O last 3 completed shifts:  In: -   Out: 2400 [Urine:2400]    Today's Exam  Constitutional:  NAD,   Neuropsyche:  alert and oriented, answers questions appropriately.   Respiratory:  Breathing comfortably, decreased air exchange, no wheezes, no crackles.   Cardiovascular:  Regular rate and rhythm with low-pitched systolic murmur, 2+ edema LLE. No edema on RLE  GI:  soft, NT/ND, BS normal  Skin/Integumen:  Scattered bruising and excoriations. No acute rash.     Medications   All medications reviewed on 04/20/24   Current Facility-Administered Medications   Medication Dose Route Frequency Provider Last Rate Last Admin     Current Facility-Administered Medications   Medication Dose Route Frequency Provider Last Rate Last Admin    acetaminophen (TYLENOL) tablet 1,000 mg  1,000 mg Oral TID Jeremi Martinez PA-C   1,000 mg at 04/20/24 0758    amLODIPine (NORVASC) tablet 5 mg  5 mg Oral Daily Jeremi Martinez PA-C   5 mg at 04/20/24 0758    artificial saliva  (BIOTENE MT) solution 1 spray  1 spray Mouth/Throat 4x Daily Jeremi Martinez PA-C   1 spray at 04/20/24 0809    aspirin EC tablet 81 mg  81 mg Oral Daily Mayra Fonseca MD   81 mg at 04/20/24 0800    carvedilol (COREG) tablet 6.25 mg  6.25 mg Oral BID w/meals Jeremi Martinez PA-C   6.25 mg at 04/20/24 0758    cefTRIAXone (ROCEPHIN) 1 g vial to attach to  mL bag for ADULTS or NS 50 mL bag for PEDS  1 g Intravenous Q24H Mayra Fonseca MD   1 g at 04/19/24 1957    [Held by provider] furosemide (LASIX) tablet 20 mg  20 mg Oral Daily Jeremi Martinez PA-C        insulin aspart (NovoLOG) injection (RAPID ACTING)  1-3 Units Subcutaneous TID AC Mayra Fonseca MD   2 Units at 04/20/24 0909    insulin aspart (NovoLOG) injection (RAPID ACTING)  1-3 Units Subcutaneous At Bedtime Mayra Fonseca MD   1 Units at 04/19/24 2230    insulin aspart (NovoLOG) injection (RAPID ACTING)  3 Units Subcutaneous TID AC Mayra Fonseca MD   3 Units at 04/20/24 0908    insulin glargine (LANTUS PEN) injection 8 Units  8 Units Subcutaneous QAM AC Mayra Fonseca MD   8 Units at 04/20/24 0908    iron sucrose (VENOFER) 200 mg in sodium chloride 0.9 % 120 mL intermittent infusion  200 mg Intravenous Q24H Mayra Fonseca  mL/hr at 04/19/24 1313 200 mg at 04/19/24 1313    isosorbide mononitrate (IMDUR) 24 hr tablet 60 mg  60 mg Oral QPM Mayra Fonseca MD   60 mg at 04/20/24 0758    mirtazapine (REMERON) tablet 15 mg  15 mg Oral At Bedtime Jeremi Martinez PA-C   15 mg at 04/19/24 2230    polyethylene glycol (MIRALAX) Packet 17 g  17 g Oral Daily Jeremi Martinez PA-C   17 g at 04/20/24 0800    sodium chloride (PF) 0.9% PF flush 3 mL  3 mL Intracatheter Q8H Jeremi Martinez PA-C   3 mL at 04/19/24 1727    tamsulosin (FLOMAX) capsule 0.4 mg  0.4 mg Oral At Bedtime Jeremi Martinez PA-C   0.4 mg at 04/19/24 2230    torsemide (DEMADEX) tablet 5 mg  5 mg Oral Daily Mayra Fonseca MD   5 mg at 04/19/24 1314      PRN Meds:   Current Facility-Administered Medications   Medication Dose Route Frequency Provider Last Rate Last Admin    acetaminophen (TYLENOL) tablet 650 mg  650 mg Oral Q4H PRN Jeremi Martinez PA-C   650 mg at 04/17/24 2338    Or    acetaminophen (TYLENOL) Suppository 650 mg  650 mg Rectal Q4H PRN Jeremi Martinez PA-C        calcium carbonate (TUMS) chewable tablet 1,000 mg  1,000 mg Oral 4x Daily PRN Jeremi Martinez PA-C        glucose gel 15-30 g  15-30 g Oral Q15 Min PRN Jeremi Martinez PA-C   30 g at 04/19/24 1711    Or    dextrose 50 % injection 25-50 mL  25-50 mL Intravenous Q15 Min PRN Jeremi Martinez PA-C        Or    glucagon injection 1 mg  1 mg Subcutaneous Q15 Min PRN Jeremi Martinez PA-C        diphenhydrAMINE (BENADRYL) injection 50 mg  50 mg Intravenous Once PRN Mayra Fonseca MD        EPINEPHrine (ADRENALIN) kit 0.3 mg  0.3 mg Intramuscular Q3 Min PRN Mayra Fonseca MD        famotidine (PEPCID) injection 20 mg  20 mg Intravenous Once PRN Mayra Fonseca MD        furosemide (LASIX) injection 40 mg  40 mg Intravenous Once PRN Mayra Fonseca MD        HOLD: ALL Anticoagulant medications until AFTER surgery   Does not apply HOLD Jeremi Martinez PA-C        hydrALAZINE (APRESOLINE) tablet 10 mg  10 mg Oral Q4H PRN Jeremi Martinez PA-C        Or    hydrALAZINE (APRESOLINE) injection 10 mg  10 mg Intravenous Q4H PRN Jeremi Martinez PA-C        lidocaine (LMX4) cream   Topical Q1H PRN Jeremi Martinez PA-C        lidocaine 1 % 0.1-1 mL  0.1-1 mL Other Q1H PRN Jeremi Martinez PA-C        methylPREDNISolone sodium succinate (solu-MEDROL) injection 125 mg  125 mg Intravenous Once PRN Mayra Fonseca MD        naloxone (NARCAN) injection 0.2 mg  0.2 mg Intravenous Q2 Min PRN Mayra Fonseca MD        Or    naloxone (NARCAN) injection 0.4 mg  0.4 mg Intravenous Q2 Min PRN Mayra Fonseca MD        Or    naloxone (NARCAN) injection 0.2 mg  0.2 mg  Intramuscular Q2 Min PRN Mayra Fonseca MD        Or    naloxone (NARCAN) injection 0.4 mg  0.4 mg Intramuscular Q2 Min PRN Mayra Fonseca MD        ondansetron (ZOFRAN ODT) ODT tab 4 mg  4 mg Oral Q6H PRN Jeremi Martinez PA-C        Or    ondansetron (ZOFRAN) injection 4 mg  4 mg Intravenous Q6H PRN Jeremi Martinez PA-C        oxyCODONE (ROXICODONE) tablet 5 mg  5 mg Oral Q4H PRN Jeremi Martinez PA-C   5 mg at 04/18/24 1421    oxyCODONE IR (ROXICODONE) half-tab 2.5 mg  2.5 mg Oral Q4H PRN Jeremi Martinez PA-C   2.5 mg at 04/18/24 2007    prochlorperazine (COMPAZINE) injection 5 mg  5 mg Intravenous Q6H PRN Jeremi Martinez PA-C        Or    prochlorperazine (COMPAZINE) tablet 5 mg  5 mg Oral Q6H PRN Jeremi Martinez PA-C        Or    prochlorperazine (COMPAZINE) suppository 12.5 mg  12.5 mg Rectal Q12H PRN Jeremi Martinez PA-C        QUEtiapine (SEROquel) tablet 25 mg  25 mg Oral Bedtime PRN may repeat x1 Mayra Fonseca MD        [Held by provider] ramelteon (ROZEREM) tablet 8 mg  8 mg Oral At Bedtime PRN Jeremi Martinez PA-C        senna-docusate (SENOKOT-S/PERICOLACE) 8.6-50 MG per tablet 1 tablet  1 tablet Oral BID PRN Jeremi Martinez PA-C        Or    senna-docusate (SENOKOT-S/PERICOLACE) 8.6-50 MG per tablet 2 tablet  2 tablet Oral BID PRN Jeremi Martinez PA-C        sodium chloride (PF) 0.9% PF flush 3 mL  3 mL Intracatheter q1 min prn Jeremi Martinez PA-C           Data   Recent Labs   Lab 04/20/24  0751 04/20/24  0618 04/20/24  0155 04/19/24  1137 04/19/24  0801 04/18/24  1732 04/18/24  1705 04/18/24  0812 04/18/24  0733 04/18/24  0106 04/17/24  1544   WBC  --   --   --   --   --   --   --   --  13.5*  --  20.8*   HGB  --   --   --   --  9.0*  --  8.1*  --  7.7*  --  8.2*   MCV  --   --   --   --   --   --   --   --  90  --  90   PLT  --   --   --   --   --   --   --   --  286  --  312   INR  --   --   --   --   --   --   --   --  1.25*  --   --      --    --   --  139  --   --   --  135  --  135   POTASSIUM 4.3  --   --   --  4.4  --   --   --  4.7  --  4.8   CHLORIDE 98  --   --   --  100  --   --   --  98  --  98   CO2 27  --   --   --  28  --   --   --  25  --  22   BUN 28.6*  --   --   --  31.1*  --   --   --  29.2*  --  27.1*   CR 1.50*  --   --   --  1.63*  --   --   --  1.43*  --  1.35*   ANIONGAP 13  --   --   --  11  --   --   --  12  --  15   LLOYD 9.0  --   --   --  8.9  --   --   --  8.6*  --  8.7*   * 295* 152*   < > 188*   < >  --    < > 389*   < > 253*    < > = values in this interval not displayed.       No results found for this or any previous visit (from the past 24 hour(s)).

## 2024-04-20 NOTE — PROGRESS NOTES
Orthopedic Surgery  Tc Garcia  04/20/2024     Admit Date:  4/17/2024    Acute, closed, comminuted, displaced left periprosthetic proximal femur fracture through the intertrochanteric region. Candidate for joint revision, conversion to ANAI.      Patient resting comfortably in bed.  Pain controlled. Sleeping.  Tolerating oral intake.  No nausea or vomiting  Due to cardiology procedure, planning on conversion to ANAI on 4/25/2024. (Delayed from prior 4/23)    Temp:  [97.6  F (36.4  C)-98.1  F (36.7  C)] 97.6  F (36.4  C)  Pulse:  [59-84] 63  Resp:  [16-18] 18  BP: (125-167)/(69-95) 133/72  SpO2:  [84 %-98 %] 93 %    Sleeping  Minimal erythema of the surrounding skin.   Bilateral calves are soft, non-tender.  Left lower extremity is NVI.  +Dp pulse    Labs:  Recent Labs   Lab Test 04/19/24  0801 04/18/24  1705 04/18/24  0733 04/17/24  1544 03/20/24  0953 03/18/24  1206   WBC  --   --  13.5* 20.8*  --  10.2   HGB 9.0* 8.1* 7.7* 8.2*   < > 9.5*   PLT  --   --  286 312  --  280    < > = values in this interval not displayed.     Recent Labs   Lab Test 04/18/24  0733 12/11/23  1419 01/15/23  1321   INR 1.25* 1.35* 1.13         1. PLAN:   Continue DVT prophylaxis per primary team or cardiology recommendations.    Mobilize with PT/OT    NWB LLE   Continue current pain regimen      2. Disposition   Pending progress    Sejal Lovett PA-C

## 2024-04-20 NOTE — PROGRESS NOTES
Diagnosis: L hip fx  POD#: surgery 4/23  Mental Status: A&Ox2-3  Activity/dangle bedrest  Diet: mod carb  Pain: tylenol  Pruett/Voiding: external cath  02/LDA: 2L NC at night, IV SL  D/C Date: pending  Other Info: turn and repo Q2h. Skin tears L forearm, blood sugar check, cms intact. Meplex coccyx

## 2024-04-20 NOTE — PLAN OF CARE
Goal Outcome Evaluation:  SUMMARY: Admitted on 4/17 from TCU w/ SOB and leg pain and swelling after an unwitnessed fall.  S/P L. femur fx 3/23 repair   DATE & TIME: 4/20/24, 2300-6128  Cognitive concerns/Orientation: Alert and oriented x4, forgetful, pleasant  BEHAVIOR & AGGRESSION TOOL COLOR: Green  ABNL VS/O2: VSS, on RA (hypertensive, scheduled BP meds given)  MOBILITY: Bedrest Turn & Repo  PAIN MANAGEMENT: Tylenol given for hip pain  DIET: Mod Carb  BOWEL/BLADDER: Incontinent, External cath in place  ABNL LAB/BG:  , Cr 1.50  DRAIN/DEVICES: PIV SL  SKIN:  Scattered bruising  TESTS/PROCEDURES: None today  Discharge Date: Plan for surgery on Tuesday.

## 2024-04-20 NOTE — PLAN OF CARE
Date/Time: 4/20, 3521-1928    Trauma/Ortho/Medical (Choose one) Trauma    Diagnosis: left femur fracture  POD#: N/A  Mental Status: A&OX2-3  Activity/dangle: Bedrest  Diet: Mod CHO  Pain: Tylenol  Pruett/Voiding: incontinent, ext.cath  Tele/Restraints/Iso: None  02/LDA: SL,   D/C Date: TBD  Other Info: Repo Q2H with tap cushion system  in place. Plan to have surgery on Tuesday.

## 2024-04-21 NOTE — PROGRESS NOTES
Orthopedic Surgery  Tc Garcia  04/21/2024     Admit Date:  4/17/2024    Acute, closed, comminuted, displaced left periprosthetic proximal femur fracture through the intertrochanteric region. Candidate for joint revision, conversion to ANAI.      Patient resting comfortably in bed.  Pain controlled.  Tolerating oral intake.  No nausea or vomiting  Due to cardiology procedure, planning on conversion to ANAI on 4/25/2024. (Delayed from prior 4/23)    Temp:  [97.7  F (36.5  C)-97.8  F (36.6  C)] 97.8  F (36.6  C)  Pulse:  [67-84] 84  Resp:  [16-18] 16  BP: (164-172)/() 172/94  SpO2:  [93 %-96 %] 96 %    Sleeping  Minimal erythema of the surrounding skin.   Bilateral calves are soft, non-tender.  Left lower extremity is NVI.  +Dp pulse    Labs:  Recent Labs   Lab Test 04/19/24  0801 04/18/24  1705 04/18/24  0733 04/17/24  1544 03/20/24  0953 03/18/24  1206   WBC  --   --  13.5* 20.8*  --  10.2   HGB 9.0* 8.1* 7.7* 8.2*   < > 9.5*   PLT  --   --  286 312  --  280    < > = values in this interval not displayed.     Recent Labs   Lab Test 04/18/24  0733 12/11/23  1419 01/15/23  1321   INR 1.25* 1.35* 1.13         1. PLAN:   Continue DVT prophylaxis per primary team or cardiology recommendations.    Mobilize with PT/OT    NWB LLE   Continue current pain regimen      2. Disposition   Pending progress    Sejal Lovett PA-C

## 2024-04-21 NOTE — PROGRESS NOTES
Diagnosis: Left femur fracture  Mental Status: A&O x3; disorientated to situation   Activity/dangle: Bedrest; turn and reposition  Diet: Mod CHO  Pruett/Voiding: External cath  Pain: Tylenol   Tele/Restraints/Iso: N/A  02/LDA: OSMAR WALKER.   D/C Date: TBD   Other Info: Scattered bruises, BG checks w/ insulin coverage, plan for surgery on Tuesday.

## 2024-04-21 NOTE — PLAN OF CARE
4/21/24  7a-7p     Trauma/Ortho/Medical (Choose one): Trauma     Diagnosis: left femur fracture  POD#: N/A  Mental Status: A&O x2-3  Activity/dangle: strict bedrest  Diet: Mod CHO  Pain: Tylenol, Oxycodone  Pruett/Voiding: incontinent, external catheter  Tele/Restraints/Iso: None  02/LDA: 1L O@ via nasal cannula when sleeping, PIV SL   D/C Date: TBD  Other Info: Repo Q2H with tap cushion system in place. Plan to have surgery on Tuesday.

## 2024-04-21 NOTE — PROGRESS NOTES
"[facility]    Hospitalist Progress Note    Date of Service (when I saw the patient): 04/21/2024  Admit date: 4/17/2024    Interval History   Full details of events over last 24 hours outlined below.   Tc has been afebrile hemodynamically stable.  Blood pressure up in the 170s  Weaned to RA with O2 95% but back on 2L this AM 96%.  No hypoxia documented.  Continues on torsemide 5 mg daily, with continued excellent UOP, over 3 L in the last 24 hours. No weight recorded today.   Cr improved at 1.37.   He states he feels better than he has for 25 years. He's on \"top of the world.\" He has on concerns.  Note he had one dose of oxycodone 2.5 mg today. Otherwise no other recent use.     Assessment & Plan   Tc Garcia is a 84 year old male , with a PMH of HFpEF, CKD, A-fib on AC, DM type I, diabetic nephropathy, HTN, pulmonary HTN, recent intracranial hemorrhage in 12/2023, recent L hip fracture, s/p IM nailing on 3/23/24 who was admitted on 4/17/2024 from TCU w SOB and leg pain and swelling after an unwitnessed fall.     At presentation Afeb, P 86, /68 O2 90% on RA.   Na 135, K 4.7, Cr 1.43 (baseline ~ 1.4) , glucose 253, BNP 5202. Procal 1.06  Troponin 39, 38.   WBC 20.8, hgb 8.2 (baseline ~ 9), plts 312  UA clear.   Covid/flu/RSV negative.     CXR personally reviewed, rotated to left there is pulmonary vascular congestion, seen previously. Left lower lung shows haziness and blunting of the costophrenic angle which is unchanged from prior films, likely chronic post-op changes.      Fall, with left hip fracture  S/p L femur fx 3/23 repair  S/p left hip IM nail 3/23/24 at [facility] Presents from TCU w reported fall, SOB/, leg pain. XR w new left hip fracture,.  Reportedly fell today though he is adamantly denying that.  (patient confused).  Ortho consult > plan for repair on 4/23 due to lack of OR availability.  Will remain here awaiting surgery as long as remains comfortable.  Given his " immobility prior to surgery, I started lovenox 40 mg subcutaneous daily on 04/20/24. (Discussed with ortho on 04/20/24.)   Defer holding lovenox to surgical team given unclear if surgery is going to occur on 4/23.   APAP TID  Using minimal doses of oxycodone ~ 2.5 mg / day.     Left leg swelling  U/S : No evidence of deep venous thrombosis.  Complex fluid   collection in the left groin. This most likely represents a hematoma.      Chronic normocytic anemia  Possible vomiting blood 4/17  Iron deficiency (See iron indices below)   Per report from daughter from the TCU.  Patient denies any abdominal symptoms and is not vomiting but will watch very carefully for that  With recent hip fx.Last baseline hemoglobin 4/15 7.8 with an MCV 90.6.   On arrival he is 8.2, with shortness of breath which is more likely possibly due to volume overload vs anemia.  * S/p 1 unit preop to get Hgb > 8 on 4/18 per ortho's recommendation. Wanted hgb > 8 prior to surgery.     Recent Labs   Lab 04/19/24  0801 04/18/24  1705 04/18/24  0733 04/17/24  1544   HGB 9.0* 8.1* 7.7* 8.2*     Monitor for signs of GI bleeding  Iron deficient (see labs below)   Venofer 200 mg daily x 5 days ordered on 04/19/24.      Recent Labs   Lab 04/19/24  0801 04/18/24  1705 04/18/24  0733   WBC  --   --  13.5*   HGB 9.0*   < > 7.7*   HCT  --   --  25.4*   MCV  --   --  90   PLT  --   --  286   IRON  --   --  14*   IRONSAT  --   --  6*   FEB  --   --  235*   ERNESTO  --   --  177    < > = values in this interval not displayed.       Acute respiratory failure with hypoxia   HFpEF, Suspected volume overload  Elevated troponin, likely demand ischemia  Mild pulmonary HTN  PTA Lasix 20 (recent incr). SOB at presentation. CXR shows chronic pulmonary vascular congestion and changes in left chest.  BNP 5000, last BNP 8120 on 4/15 (being seen for chest pain in the ED, they felt he likely was volume overloaded and increased his diuretic to 20 daily.).    * S/p Lasix 20 IV in ED.     * Do not suspect ACS, acute ischemia.     Recent Labs   Lab 04/17/24  1922 04/17/24  1544   NTBNPI  --  5,202*   CTROPT 38* 39*     EF > 70%, flattened septum consistent with volume overload.  Mildly decreased RV systolic function.  Severe biatrial enlargement.  Moderate to severe TR with dilation of the IVC and abnormal respiratory variation.  And severe pulmonary hypertension.  R heart cath on 4/18/24 indicated just mild pulmonary HTN, with elevated left sided filling pressures and wedge of 21, c/w some degree of fluid overload.   Started torsemide 5 mg daily on 04/19/24. (Discussed with cardiology, recommended gentle diuresis for high wedge.)  BMP daily.   On 04/21/24, Cr improved, excellent diuresis.  Weaned to O2 during the day, but placed on O2 during the night without documentation of hypoxia (per RN desaturates at night).   Continue PTA carvedilol 6.25 twice daily   Continue PTA amlodipine 5 QD   Continue PTA isosorbide 60 every day    Vitals:    04/17/24 1533 04/18/24 0712 04/19/24 0705 04/20/24 0700   Weight: 72.6 kg (160 lb) 63.7 kg (140 lb 6.9 oz) 72 kg (158 lb 11.7 oz) 65.8 kg (145 lb 1 oz)     I/O last 3 completed shifts:  In: -   Out: 3050 [Urine:3050]  Recent Labs   Lab 04/21/24  0758 04/20/24  0751 04/19/24  0801 04/18/24  0733 04/17/24  1544   CO2 28 27 28 25 22    138 139 135 135   POTASSIUM 4.2 4.3 4.4 4.7 4.8   BUN 23.7* 28.6* 31.1* 29.2* 27.1*   CR 1.37* 1.50* 1.63* 1.43* 1.35*          Questionable CAP, elevated Pro-Hai  Low suspicion  * Noted to have elevated Pro-Hai and a white count of 20, though could be demargination secondary to pain/fracture.  Afebrile.  CXR favors old opacity without clear infiltrate.  But with the Pro-Hai think it is possible he has a community-acquired pneumonia.    Completes 5 day course of ceftriaxone on today 04/21/24.     Recent Labs   Lab 04/19/24  0801 04/18/24  1705 04/18/24  0733 04/17/24  1544   WBC  --   --  13.5* 20.8*   HGB 9.0* 8.1* 7.7* 8.2*    PLT  --   --  286 312        A-fib, previously on anticoagulation -  Stopped following traumatic brain bleed on in Dec 2023.   History of Traumatic ICH in 12/2023  History of completed stroke  * Admission [facility] without current bleed: CT head with small chronic lacunar infarct right caudate.  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   Cardiology is considering Watchman. See clinic neurology note 2/2/24 and clinic cardiology note 1/29/24.   Anticoagulation cleared by NSG > However, extremely high fall risk with multiple hospitalizations secondary to falls, so holding until Watchman.  PTA rate control with coreg    CKD  3b, hx nephrotic proteinuria  Last baseline creatinine 1.3 on 4/15/2024, although on chart review appears 1.6.    He is at 1.3 at this time.    Note Cr up on 04/19/24 but his baseline is varies around 1.6.   Started torsemide 5 mg daily on 4/19, follow daily Cr, excellent diuresis.     Recent Labs   Lab 04/21/24  0758 04/20/24  0751 04/19/24  0801 04/18/24  0733 04/17/24  1544   CR 1.37* 1.50* 1.63* 1.43* 1.35*       DM w hyperglycemia, A1c 9 on 3/20/2024  Hypoglycemia on 04/20/24  * Unclear if type I or type II, both listed in notes. Diagnosed in his 60s but on minimal insulin and not PO medications, consistent with type I.   >250 on admit, ate tonight, resume lantus   * On lantus 8 units with humolog 4 units TID.     Did not receive bedtime lantus 8 units last night, BS up in the 300s, bicarb, AG normal.   Continue PTA lantus 8 units, novolog 4 units with meals.   Hyperglycemia with intermittent low post-prandial sugars. Low BS of 48 on 4/19/24 prior to supper and low BS of 61 prior to bedtime on 4/20.    On 4/19, changed to 3 units with meals and decreased from high > low sliding scale insulin.   On 4/20, BS very elevated mid-day, increased prandial insulin back to 4 units with low >  medium sliding scale insulin.   On 04/21/24 reviewed insulin administration  with nurse. Lows do not correspond to amount of insulin give. He can receive 6 units and next BS will be in the 300's. Other times, this same pre-prandial dose will cause a subsequent low. RN unaware of any change in intake to cause lows and patient unable to report due to poor memory.   Change insulin from 4 units with meals to 1 unit / 20 g carbs.      Recent Labs   Lab 04/21/24  1204 04/21/24  0758 04/21/24  0522 04/21/24  0206 04/20/24  2344   * 379*  387* 251* 245* 131*        BPH  PTA flomax resumed    Acute metabolic encephalopathy, RESOLVED  Previous cognitive decline  Mood disorder, with insomina  * Daughter notes that patient is at cognitive baseline, history of at times paranoia delusions.  Last slums 17/30.  No formal dementia diagnosis but in DDx. Past stroke as above.  History of alcohol abuse possible contributes.   Continue PTA mirtazapine 15 HS  PRN Roserem on hold around pain medications.   Since HD# 1 patient has been alert and oriented.   Maintain normal day/night, sleep wake cycles  Minimize sedating/altering medications as able  Quetiapine PRN for insomnia and agitation at night only.     Recent C7 and T12 compression fracture with cervical canal stenosis on MRI during Februray  admission for left hip fracture.   Discharged on c-collar to be worn at all times until follow up x-rays and neurosurgical follow up, which was never done. No longer wearing c-collar.   Appreciate neurosurgery evaluation by Shiela Warren PA-C on 4/18/24   Given he is having no neck pain, no further imaging or intervention.      Goals of care  Remains DNR/DNI.  Discussed goals with patient, with daughter and wife present.  Patient wants to continue to get restorative care.  He is not comfort cares.  He is enjoying his life.    Family agrees with his decision.     Clinically Significant Risk Factors                  # Hypertension: Noted on problem list  # Chronic heart failure with preserved ejection fraction:  heart failure noted on problem list and last echo with EF >50%      # DMII: A1C = 9.0 % (Ref range: <5.7 %) within past 6 months       # Financial/Environmental Concerns:             Diet: Orders Placed This Encounter      Combination Diet Moderate Consistent Carb (60 g CHO per Meal) Diet; 2 gm NA Diet     IVF: None  DVT Prophylaxis: ASA daily, per ortho  Pruett Catheter: Not present    No CPR- Do NOT Intubate  Disposition:  Anticipate discharge TBD  PT/OT pending post surgery   Communication: Discussed with RN, patient, called to discuss with wife and daughter, no answer on 04/21/24.     Mayra Fonseca MD    Hospitalist Service  [facility]  Securely message with the Vocera Web Console (learn more here)  Text page via [employer] Paging/Directory      -Data reviewed today: I reviewed all new labs and imaging results over the last 24 hours. I personally reviewed no images or EKG's today.    Physical Exam   Temp: 97.8  F (36.6  C) Temp src: Oral BP: (!) 172/94 Pulse: 84   Resp: 16 SpO2: 96 % O2 Device: Nasal cannula Oxygen Delivery: 2 LPM  Vitals:    04/18/24 0712 04/19/24 0705 04/20/24 0700   Weight: 63.7 kg (140 lb 6.9 oz) 72 kg (158 lb 11.7 oz) 65.8 kg (145 lb 1 oz)     Vital Signs with Ranges  Temp:  [97.7  F (36.5  C)-97.8  F (36.6  C)] 97.8  F (36.6  C)  Pulse:  [67-84] 84  Resp:  [16-18] 16  BP: (164-172)/() 172/94  SpO2:  [93 %-96 %] 96 %  I/O last 3 completed shifts:  In: -   Out: 3050 [Urine:3050]    Today's Exam  Constitutional:  NAD,   Neuropsyche:  alert and oriented, answers questions appropriately.   Respiratory:  Breathing comfortably, decreased air exchange, no wheezes, no crackles.   Cardiovascular:  Regular rate and rhythm with low-pitched systolic murmur, 2+ edema LLE. No edema on RLE  GI:  soft, NT/ND, BS normal  Skin/Integumen:  Scattered bruising and excoriations. No acute rash.     Medications   All medications reviewed on 04/20/24   Current Facility-Administered  Medications   Medication Dose Route Frequency Provider Last Rate Last Admin     Current Facility-Administered Medications   Medication Dose Route Frequency Provider Last Rate Last Admin    acetaminophen (TYLENOL) tablet 1,000 mg  1,000 mg Oral TID Jeremi Martinez PA-C   1,000 mg at 04/21/24 0748    amLODIPine (NORVASC) tablet 5 mg  5 mg Oral Daily Jeremi Martinez PA-C   5 mg at 04/21/24 0749    artificial saliva (BIOTENE MT) solution 1 spray  1 spray Mouth/Throat 4x Daily Jeremi Martinez PA-C   1 spray at 04/21/24 1210    aspirin EC tablet 81 mg  81 mg Oral Daily Mayra Fonseca MD   81 mg at 04/21/24 0749    carvedilol (COREG) tablet 6.25 mg  6.25 mg Oral BID w/meals Jeremi Martinez PA-C   6.25 mg at 04/21/24 0748    cefTRIAXone (ROCEPHIN) 1 g vial to attach to  mL bag for ADULTS or NS 50 mL bag for PEDS  1 g Intravenous Q24H Mayra Fonseca MD   1 g at 04/20/24 2122    enoxaparin ANTICOAGULANT (LOVENOX) injection 40 mg  40 mg Subcutaneous Q24H Mayra Fonseca MD   40 mg at 04/20/24 1618    [Held by provider] furosemide (LASIX) tablet 20 mg  20 mg Oral Daily Jeremi Martinez PA-C        insulin aspart (NovoLOG) injection (RAPID ACTING)  4 Units Subcutaneous TID Mayra Avila MD   4 Units at 04/21/24 1209    insulin aspart (NovoLOG) injection (RAPID ACTING)  1-7 Units Subcutaneous TID Mayra Avila MD   3 Units at 04/21/24 1209    insulin aspart (NovoLOG) injection (RAPID ACTING)  1-5 Units Subcutaneous At Bedtime Mayra Fonseca MD        insulin glargine (LANTUS PEN) injection 8 Units  8 Units Subcutaneous QAM Mayra Avila MD   8 Units at 04/21/24 0817    iron sucrose (VENOFER) 200 mg in sodium chloride 0.9 % 120 mL intermittent infusion  200 mg Intravenous Q24H Mayra Fonseca  mL/hr at 04/21/24 1210 200 mg at 04/21/24 1210    isosorbide mononitrate (IMDUR) 24 hr tablet 60 mg  60 mg Oral QPM Mayra Fonseca MD   60 mg at 04/21/24 0749    mirtazapine  (REMERON) tablet 15 mg  15 mg Oral At Bedtime Jeremi Martinez PA-C   15 mg at 04/20/24 2122    polyethylene glycol (MIRALAX) Packet 17 g  17 g Oral Daily Jeremi Martinez PA-C   17 g at 04/21/24 0748    sodium chloride (PF) 0.9% PF flush 3 mL  3 mL Intracatheter Q8H Jeremi Martinez PA-C   3 mL at 04/21/24 0819    tamsulosin (FLOMAX) capsule 0.4 mg  0.4 mg Oral At Bedtime Jeremi Martinez PA-C   0.4 mg at 04/20/24 2122    torsemide (DEMADEX) tablet 5 mg  5 mg Oral Daily Mayra Fonseca MD   5 mg at 04/21/24 0749     PRN Meds:   Current Facility-Administered Medications   Medication Dose Route Frequency Provider Last Rate Last Admin    acetaminophen (TYLENOL) tablet 650 mg  650 mg Oral Q4H PRN Jeremi Martinez PA-C   650 mg at 04/20/24 1616    Or    acetaminophen (TYLENOL) Suppository 650 mg  650 mg Rectal Q4H PRN Jeremi Martinez PA-C        calcium carbonate (TUMS) chewable tablet 1,000 mg  1,000 mg Oral 4x Daily PRN Jeremi Martinez PA-C        glucose gel 15-30 g  15-30 g Oral Q15 Min PRN Jeremi Martinez PA-C   30 g at 04/19/24 1711    Or    dextrose 50 % injection 25-50 mL  25-50 mL Intravenous Q15 Min PRN Jeremi Martinez PA-C   25 mL at 04/20/24 2131    Or    glucagon injection 1 mg  1 mg Subcutaneous Q15 Min PRN Jeremi Martinez PA-C        diphenhydrAMINE (BENADRYL) injection 50 mg  50 mg Intravenous Once PRN Mayra Fonseca MD        EPINEPHrine (ADRENALIN) kit 0.3 mg  0.3 mg Intramuscular Q3 Min PRN Mayra Fonseca MD        famotidine (PEPCID) injection 20 mg  20 mg Intravenous Once PRN Mayar Fonseca MD        furosemide (LASIX) injection 40 mg  40 mg Intravenous Once PRN Mayra Fonseca MD        HOLD: ALL Anticoagulant medications until AFTER surgery   Does not apply HOLD Jeremi Martinez PA-C        hydrALAZINE (APRESOLINE) tablet 10 mg  10 mg Oral Q4H PRN Jeremi Martinez PA-C        Or    hydrALAZINE (APRESOLINE) injection 10 mg  10 mg Intravenous Q4H PRN  Jeremi Martinez PA-C        lidocaine (LMX4) cream   Topical Q1H PRN Jeremi Martinez PA-C        lidocaine 1 % 0.1-1 mL  0.1-1 mL Other Q1H PRN Jeremi Martinez PA-C        methylPREDNISolone sodium succinate (solu-MEDROL) injection 125 mg  125 mg Intravenous Once PRN Mayra Fonseca MD        naloxone (NARCAN) injection 0.2 mg  0.2 mg Intravenous Q2 Min PRN Mayra Fonseca MD        Or    naloxone (NARCAN) injection 0.4 mg  0.4 mg Intravenous Q2 Min PRN Mayra Fonseca MD        Or    naloxone (NARCAN) injection 0.2 mg  0.2 mg Intramuscular Q2 Min PRN Mayra Fonseca MD        Or    naloxone (NARCAN) injection 0.4 mg  0.4 mg Intramuscular Q2 Min PRN Mayra Fonseca MD        ondansetron (ZOFRAN ODT) ODT tab 4 mg  4 mg Oral Q6H PRN Jeremi Martinez PA-C        Or    ondansetron (ZOFRAN) injection 4 mg  4 mg Intravenous Q6H PRN Jeremi Martinez PA-C        oxyCODONE (ROXICODONE) tablet 5 mg  5 mg Oral Q4H PRN Jeremi Martinez PA-C   5 mg at 04/18/24 1421    oxyCODONE IR (ROXICODONE) half-tab 2.5 mg  2.5 mg Oral Q4H PRN Jeremi Martinez PA-C   2.5 mg at 04/21/24 0754    prochlorperazine (COMPAZINE) injection 5 mg  5 mg Intravenous Q6H PRN Jeremi Martinez PA-C        Or    prochlorperazine (COMPAZINE) tablet 5 mg  5 mg Oral Q6H PRN Jeremi Martinez PA-C        Or    prochlorperazine (COMPAZINE) suppository 12.5 mg  12.5 mg Rectal Q12H PRN Jeremi Martinez PA-C        QUEtiapine (SEROquel) tablet 25 mg  25 mg Oral Bedtime PRN may repeat x1 Mayra Fonseca MD        [Held by provider] ramelteon (ROZEREM) tablet 8 mg  8 mg Oral At Bedtime PRN Jeremi Martinez PA-C        senna-docusate (SENOKOT-S/PERICOLACE) 8.6-50 MG per tablet 1 tablet  1 tablet Oral BID PRN Jeremi Martinez PA-C        Or    senna-docusate (SENOKOT-S/PERICOLACE) 8.6-50 MG per tablet 2 tablet  2 tablet Oral BID PRN Jeremi Martinez PA-C        sodium chloride (PF) 0.9% PF flush 3 mL  3 mL Intracatheter q1  Jeremi Richardson PA-C   3 mL at 04/20/24 2136       Data   Recent Labs   Lab 04/21/24  1204 04/21/24  0758 04/20/24  0851 04/20/24  0751 04/19/24  1137 04/19/24  0801 04/18/24  1732 04/18/24  1705 04/18/24  0812 04/18/24  0733 04/18/24  0106 04/17/24  1544   WBC  --   --   --   --   --   --   --   --   --  13.5*  --  20.8*   HGB  --   --   --   --   --  9.0*  --  8.1*  --  7.7*  --  8.2*   MCV  --   --   --   --   --   --   --   --   --  90  --  90   PLT  --   --   --   --   --   --   --   --   --  286  --  312   INR  --   --   --   --   --   --   --   --   --  1.25*  --   --    NA  --  137  --  138  --  139  --   --   --  135  --  135   POTASSIUM  --  4.2  --  4.3  --  4.4  --   --   --  4.7  --  4.8   CHLORIDE  --  98  --  98  --  100  --   --   --  98  --  98   CO2  --  28  --  27  --  28  --   --   --  25  --  22   BUN  --  23.7*  --  28.6*  --  31.1*  --   --   --  29.2*  --  27.1*   CR  --  1.37*  --  1.50*  --  1.63*  --   --   --  1.43*  --  1.35*   ANIONGAP  --  11  --  13  --  11  --   --   --  12  --  15   LLOYD  --  8.9  --  9.0  --  8.9  --   --   --  8.6*  --  8.7*   * 379*  387*   < > 339*   < > 188*   < >  --    < > 389*   < > 253*    < > = values in this interval not displayed.       No results found for this or any previous visit (from the past 24 hour(s)).

## 2024-04-22 NOTE — PROGRESS NOTES
Orthopedic Surgery  Tc Garcia  04/22/2024     Admit Date:  4/17/2024    Acute, closed, comminuted, displaced left periprosthetic proximal femur fracture through the intertrochanteric region     Patient resting comfortably in bed.   Pain controlled to the left hip.  Tolerating oral intake.  On 1 L O2 via nasal cannula.  NAEO.    Due to OR and surgeon availability as the patient requires a joint revision specialist (Dr. Yahir Pulido), planning on left hip IM nail conversion to ANAI in the setting of a periprosthetic fracture on 4/25/2024.     Temp:  [97.7  F (36.5  C)-98.7  F (37.1  C)] 98.3  F (36.8  C)  Pulse:  [61-83] 73  Resp:  [15-18] 18  BP: (136-170)/(73-99) 136/85  SpO2:  [86 %-99 %] 97 %    Somnolent, but does respond to some questions.   Minimal ecchymosis of the surrounding skin of the left hip/thigh, no open wounds to examined areas.   Bilateral calves are soft, non-tender.  Left lower extremity is NVI.  No grimacing with passive ankle DF and PF, and great toe flexion and extension.  +Dp pulse    Labs:  Recent Labs   Lab Test 04/22/24  0804 04/19/24  0801 04/18/24  1705 04/18/24  0733 04/17/24  1544 03/20/24  0953 03/18/24  1206   WBC  --   --   --  13.5* 20.8*  --  10.2   HGB 10.1* 9.0* 8.1* 7.7* 8.2*   < > 9.5*   PLT  --   --   --  286 312  --  280    < > = values in this interval not displayed.     Recent Labs   Lab Test 04/18/24  0733 12/11/23  1419 01/15/23  1321   INR 1.25* 1.35* 1.13     1. Plan:   Plan for left hip procedure on 4/25/24. This was discussed with hospitalist and RN today.  Continue Lovenox for DVT prophylaxis per primary team or cardiology recommendations.    Mobilize with PT/OT.    NWB LLE.   Continue current pain regimen.   Continue Q4H NV checks LLE.   Ortho will continue to follow.     2. Disposition:   Pending OR on 4/25/24 with Dr. Yahir Pulido.    *I spoke with the patient's daughter, Camille, by phone at 1505 today to review plans for surgery for the left hip for  4/25/24.    Carolyn Gutierrez PA-C  [facility]

## 2024-04-22 NOTE — PLAN OF CARE
Trauma/Ortho/Medical (Choose one): Trauma     Diagnosis: left femur fracture  POD#: N/A  Mental Status: A&O x3 disoriented to time intermittent confusion   Activity/dangle: strict bedrest tried to get out of bed multiple times overnight   Diet: Mod CHO  Pain: denies this shift   Pruett/Voiding: incontinent B/B voided in urinal when encouraged  2 small BM's this shift   Tele/Restraints/Iso: None  02/LDA: 2L  via nasal cannula, PIV SL   D/C Date: TBD  Other Info: Repo Q2H with tap cushion system in place. Plan to have surgery on Thursday.

## 2024-04-22 NOTE — PROGRESS NOTES
[facility]    Medicine Progress Note - Hospitalist Service    Date of Admission:  4/17/2024    Assessment & Plan   Tc Garcia is an 84 year old male with a past medical history of HFpEF, CKD, A-fib on AC, DM type I, diabetic nephropathy, HTN, pulmonary HTN, recent intracranial hemorrhage in 12/2023, recent L hip fracture, s/p IM nailing on 3/23/24, who was admitted on 4/17/2024 from TCU with shortness of breath, leg pain and swelling, and recent unwitnessed fall.      S/p fall, with left hip fracture  S/p L femur fx 3/23/24 repair  S/p left hip IM nail 3/23/24 at [place], presented from TCU with reported fall, SOB/, leg pain. XR w new left hip fracture.  Orthopedics consulted, awaiting surgery this week  Continue enoxaparin (Lovenox), hold per orthopedics pending timing of surgery  Pain control, see hospital orders     Left leg swelling  Ultrasound : No evidence of deep venous thrombosis.  Complex fluid   collection in the left groin. This most likely represents a hematoma.   Orthopedics consulted as above, monitor    Chronic normocytic anemia  Possible vomiting of blood 4/17  Iron deficiency (See iron indices below)   Per report from daughter from the TCU.  Patient denied any abdominal symptoms and was not vomiting, continue to monitor closely.  With recent hip fracture, prior baseline hemoglobin 4/15 7.8 with an MCV 90.6.   On arrival was 8.2, with shortness of breath, possibly due to volume overload vs symptomatic anemia.  * S/p 1 unit preop to get Hgb > 8 on 4/18 per ortho's recommendation. Wanted hgb > 8 prior to surgery.   Monitor for signs of GI bleeding  Iron deficient (see labs in Epic)  Venofer 200 mg daily x 5 days ordered on 04/19/24.   Recent Labs   Lab 04/22/24  0804 04/19/24  0801 04/18/24  1705 04/18/24  0733 04/17/24  1544   HGB 10.1* 9.0* 8.1* 7.7* 8.2*     Acute respiratory failure with hypoxia   Heart failure with preserved ejection fraction (HFpEF), suspected volume  overload  Elevated troponin, likely demand ischemia  Mild pulmonary hypertension  Shortness of breath at presentation. CXR showed chronic pulmonary vascular congestion and changes in left chest.  BNP 5000, last BNP 8120 on 4/15 (being seen for chest pain in the ED, they felt he likely was volume overloaded and increased his diuretic to 20 daily.).    * S/p Lasix 20 IV in ED.    * Low suspicion of acute coronary syndrome; monitored  On ECHO, EF > 70%, flattened septum consistent with volume overload.  Mildly decreased RV systolic function.  Severe biatrial enlargement.  Moderate to severe TR with dilation of the IVC and abnormal respiratory variation.  And severe pulmonary hypertension.  R heart cath on 4/18/24 indicated just mild pulmonary HTN, with elevated left sided filling pressures and wedge of 21, c/w some degree of fluid overload.   Started torsemide 5 mg daily on 04/19/24. (Previously discussed with cardiology, recommended gentle diuresis for high wedge.)  Monitor labs  On 04/21/24, serum Cr improved, with diuresis.  Weaned to O2 during the day, but placed on O2 during the night without documentation of hypoxia (per RN desaturates at night).   Continue PTA carvedilol 6.25 mg twice daily   Continue PTA amlodipine 5 mg QD   Continue PTA isosorbide 60 mg every day     Questionable community-acquired pneumonia, low suspicion  * Noted to have elevated Pro-Hai and a white count of 20k, though felt could be demargination secondary to pain/fracture.  Afebrile.  CXR favored old opacity without clear infiltrate.  Elevated Pro-Hai cannot rule out mild community-acquired pneumonia.    Completed 5 day course of ceftriaxone on 04/21/24; monitor   Recent Labs   Lab 04/18/24  0733 04/17/24  1544   WBC 13.5* 20.8*      Atrial fibrillation, previously on anticoagulation -  Stopped following traumatic brain bleed on in Dec 2023.   History of Traumatic ICH in 12/2023  History of completed stroke  * Admission [facility] without current  bleed: CT head with small chronic lacunar infarct right caudate.  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   Cardiology considering Watchman. See clinic neurology note 2/2/24 and clinic cardiology note 1/29/24 for details.   Anticoagulation cleared by NSG > However, extremely high fall risk with multiple hospitalizations secondary to falls, so holding until Watchman, follow-up as outpatient.  PTA rate control with carvedilol (Coreg) as above     Chronic kidney disease 3b, hx nephrotic proteinuria  Monitor serum Cr, urine output, I/O's, daily weights  Started torsemide 5 mg daily on 4/19  Recent Labs   Lab 04/22/24  0804 04/21/24  0758 04/20/24  0751 04/19/24  0801 04/18/24  0733 04/17/24  1544   CR 1.28* 1.37* 1.50* 1.63* 1.43* 1.35*      Diabetes mellitus with hyperglycemia, A1c 9% on 3/20/2024  Hypoglycemia on 04/20/24  * Unclear if type I or type II, both listed in notes. Diagnosed in his 60s but on minimal insulin and not PO medications, consistent with type I.   >250 on admit  * On lantus 8 units with humolog 4 units TID prior to admission   Continue long and short-acting insulin, adjust as needed  Monitor blood sugars  Recent Labs   Lab 04/22/24  1218 04/22/24  0915 04/22/24  0804 04/22/24  0803 04/22/24  0211 04/21/24  2257   * 363* 397* 403* 233* 108*      Benign prostatic hypertrophy   Continue tamsulosin (Flomax)      Acute metabolic encephalopathy, resolved  Previous cognitive decline  Mood disorder, with insomina  * Daughter notes that patient is at cognitive baseline, history of at times paranoia delusions.  Last slums 17/30.  No formal dementia diagnosis but in DDx. Past stroke as above.  History of alcohol abuse possible contributes.   Continue PTA mirtazapine 15 mg at bedtime   PRN Roserem on HOLD   Maintain normal day/night, sleep wake cycles  Minimize sedating/altering medications as able  Quetiapine PRN for insomnia and agitation at night only,  "monitor     Recent C7 and T12 compression fracture with cervical canal stenosis on MRI during Februray  admission for left hip fracture.   Discharged on C-collar to be worn at all times until follow up x-rays and neurosurgical follow up, which was never done. No longer wearing c-collar.   Neurosurgery consulted.  Appreciate neurosurgery evaluation by Shiela Warren PA-C on 4/18/24   Given he is having no neck pain, no further imaging or intervention.   Follow-up with primary clinic provider after discharge, monitor     Goals of care  Remains DNR/DNI.  Past provider discussed goals of care with patient, with daughter and wife present.  Patient wanted to continue restorative cares.  Family agrees with his decision.     Disposition  Estimated length of stay 3 to 4 days, pending surgery plans**  Anticipated discharge to transitional care unit   Social work consult        Diet: Combination Diet Moderate Consistent Carb (60 g CHO per Meal) Diet; 2 gm NA Diet  Room Service    DVT Prophylaxis: Enoxaparin (Lovenox) SQ  Pruett Catheter: Not present  Lines: None     Cardiac Monitoring: None  Code Status: No CPR- Do NOT Intubate      Clinically Significant Risk Factors                  # Hypertension: Noted on problem list  # Chronic heart failure with preserved ejection fraction: heart failure noted on problem list and last echo with EF >50%      # DMII: A1C = 9.0 % (Ref range: <5.7 %) within past 6 months       # Financial/Environmental Concerns:           Disposition Plan     Medically Ready for Discharge: Anticipated in 2-4 Days             Edmond Moya MD  Hospitalist Service  [facility]  Securely message with [employer] (more info)  Text page via [employer] Paging/Directory   ______________________________________________________________________    Interval History   First visit with patient today. Lying in bed, pleasant and interactive.  Pain controlled.  States, \"I'm feeling great\".  No shortness " of breath or chest pain.  Care plan discussed with orthopedic team today with timing of surgery pending.    Physical Exam   Vital Signs: Temp: 98.3  F (36.8  C) Temp src: Oral BP: 136/85 Pulse: 73   Resp: 18 SpO2: 97 % O2 Device: Nasal cannula Oxygen Delivery: 1 LPM  Weight: 156 lbs 8.43 oz    GENERAL awake and alert, initially napping, no acute distress, pleasant and interactive   LUNGS no wheezes or crackles  HEART S1, S2 with RRR  ABDOMEN soft, nontender  MUSCULOSKELETAL extremities without edema  SKIN warm and dry  NEURO moves upper and lower extremities spontaneously and to command  MENTAL STATUS answering questions and following simple commands    Medical Decision Making             Data     I have personally reviewed the following data over the past 24 hrs:    N/A  \   10.1 (L)   / N/A     137 97 (L) 23.2 (H) /  242 (H)   4.4 26 1.28 (H) \       Imaging results reviewed over the past 24 hrs:   No results found for this or any previous visit (from the past 24 hour(s)).

## 2024-04-22 NOTE — PLAN OF CARE
Diagnosis: Left hip fx, OR Thursday, 4/18 heart cath  POD#:  Mental Status: A&O x 3 disoriented to time, intermittent confusion  Activity/dangle bedrest  Diet:regular  Pain: scheduled tylenol  Pruett/Voiding: incontinent  Tele/Restraints/Iso: NA  02/LDA: PADILLA  D/C Date: ?  Other Info:

## 2024-04-23 NOTE — PROGRESS NOTES
[facility]    Medicine Progress Note - Hospitalist Service    Date of Admission:  4/17/2024    Assessment & Plan   Tc Garcia is an 84 year old male with a past medical history of HFpEF, CKD, A-fib on AC, DM type I, diabetic nephropathy, HTN, pulmonary HTN, recent intracranial hemorrhage in 12/2023, recent L hip fracture, s/p IM nailing on 3/23/24, who was admitted on 4/17/2024 from TCU with shortness of breath, leg pain and swelling, and recent unwitnessed fall.      S/p fall, with left hip fracture  S/p L femur fx 3/23/24 repair  S/p left hip IM nail 3/23/24 at [place], presented from TCU with reported fall, SOB/, leg pain. XR w new left hip fracture.  Orthopedics consulted, anticipated surgery 4/25  Continue enoxaparin (Lovenox) for DVT prophylaxis, hold per orthopedics pending timing of surgery  Pain control, see hospital orders  AM labs as ordered     Left leg swelling  Ultrasound 4/18: No evidence of deep venous thrombosis.  Complex fluid   collection in the left groin. This most likely represents a hematoma.   Orthopedics consulted as above, monitor    Chronic normocytic anemia  Possible vomiting of blood 4/17  Iron deficiency (See iron indices below)   Per report from daughter from the TCU.  Patient denied any abdominal symptoms and was not vomiting, continue to monitor closely.  With recent hip fracture, prior baseline hemoglobin 4/15 7.8 with an MCV 90.6.   On arrival was 8.2, with shortness of breath, possibly due to volume overload vs symptomatic anemia.  * S/p 1 unit preop to get Hgb > 8 on 4/18 per ortho's recommendation. Wanted hgb > 8 prior to surgery.   Monitor for signs of GI bleeding; no recurrent symptoms reported  Iron deficient (see labs in Epic)  Venofer 200 mg daily x 5 days ordered on 04/19/24.   Recent Labs   Lab 04/23/24  0754 04/22/24  0804 04/19/24  0801 04/18/24  1705 04/18/24  0733 04/17/24  1544   HGB 9.4* 10.1* 9.0* 8.1* 7.7* 8.2*     Acute respiratory  failure with hypoxia   Heart failure with preserved ejection fraction (HFpEF), suspected volume overload  Elevated troponin, likely demand ischemia  Mild pulmonary hypertension  Shortness of breath at presentation. CXR showed chronic pulmonary vascular congestion and changes in left chest.  BNP 5000, last BNP 8120 on 4/15 (being seen for chest pain in the ED, they felt he likely was volume overloaded and increased his diuretic to 20 daily.).    * S/p Lasix 20 IV in ED.    * Low suspicion of acute coronary syndrome; monitored  On ECHO, EF > 70%, flattened septum consistent with volume overload.  Mildly decreased RV systolic function.  Severe biatrial enlargement.  Moderate to severe TR with dilation of the IVC and abnormal respiratory variation.  And severe pulmonary hypertension.  R heart cath on 4/18/24 indicated just mild pulmonary HTN, with elevated left sided filling pressures and wedge of 21, c/w some degree of fluid overload.   Started torsemide 5 mg daily on 04/19/24. (Previously discussed with cardiology, recommended gentle diuresis for high wedge.)  Monitor labs  On 04/21/24, serum Cr improved, with diuresis.  Oxygen therapy, monitor saturations, titrate as needed; encourage spirometry  Continue torsemide as above  Continue PTA carvedilol 6.25 mg twice daily   Continue PTA amlodipine 5 mg QD   Continue PTA isosorbide 60 mg every day     Questionable community-acquired pneumonia, low suspicion  * Noted to have elevated Pro-Hai and a white count of 20k, though felt could be demargination secondary to pain/fracture.  Afebrile.  CXR favored old opacity without clear infiltrate.  Elevated Pro-Hai cannot rule out mild community-acquired pneumonia.    Completed 5 day course of ceftriaxone on 04/21/24; monitor  Recent Labs   Lab 04/23/24  0754 04/18/24  0733 04/17/24  1544   WBC 8.0 13.5* 20.8*      Atrial fibrillation, previously on anticoagulation -  Stopped following traumatic brain bleed on in Dec 2023.    History of Traumatic ICH in 12/2023  History of completed stroke  * Admission [facility] without current bleed: CT head with small chronic lacunar infarct right caudate.  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   By report, cardiology considering Watchman. See clinic neurology note 2/2/24 and clinic cardiology note 1/29/24 for details.   Anticoagulation cleared by NSG > However, extremely high fall risk with multiple hospitalizations secondary to falls, so holding until Watchman, follow-up as outpatient.  PTA rate control with carvedilol (Coreg) as above     Chronic kidney disease 3b, hx nephrotic proteinuria  Monitor serum Cr, urine output, I/O's, daily weights  Started torsemide 5 mg daily on 4/19  Recent Labs   Lab 04/23/24  0754 04/22/24  0804 04/21/24  0758 04/20/24  0751 04/19/24  0801 04/18/24  0733   CR 1.37* 1.28* 1.37* 1.50* 1.63* 1.43*      Diabetes mellitus with hyperglycemia, A1c 9% on 3/20/2024  Hypoglycemia on 04/20/24  * Unclear if type I or type II, both listed in notes. Diagnosed in his 60s but on minimal insulin and not PO medications, consistent with type I.   >250 on admit  * On lantus 8 units with humolog 4 units TID prior to admission   Continue long and short-acting insulin, adjust as needed  Monitor blood sugars, fluctuating recently despite insulin dose adjustments; continue to monitor closely  Diabetic diet  Recent Labs   Lab 04/23/24  1212 04/23/24  0842 04/23/24  0754 04/23/24  0233 04/22/24  2349 04/22/24  2312   * 395* 365* 212* 98 66*      Benign prostatic hypertrophy   Continue tamsulosin (Flomax)      Acute metabolic encephalopathy, resolved  Previous cognitive decline  Mood disorder, with insomina  * Daughter notes that patient is at cognitive baseline, history of at times paranoia delusions.  Last slums 17/30.  No formal dementia diagnosis but in DDx. Past stroke as above.  History of alcohol abuse possible contributes.   Continue PTA  mirtazapine 15 mg at bedtime   PRN Roserem on HOLD   Maintain normal day/night, sleep wake cycles  Minimize sedating/altering medications as able  Quetiapine PRN for insomnia and agitation at night only, monitor     Recent C7 and T12 compression fracture with cervical canal stenosis on MRI during [place]  admission for left hip fracture.   Discharged on C-collar to be worn at all times until follow up x-rays and neurosurgical follow up, which was never done. No longer wearing c-collar.   No current report of neck pain  Neurosurgery consulted.  Appreciate neurosurgery evaluation by Shiela Warren PA-C on 4/18/24   Given he is having no neck pain, no further imaging or intervention.   Follow-up with primary clinic provider after discharge, monitor     Goals of care  Remains DNR/DNI.  Past provider discussed goals of care with patient, with daughter and wife present.  Patient wanted to continue restorative cares.  Family agrees with his decision.     Disposition  Estimated length of stay 3 to 4 days, pending hip surgery  Anticipated discharge to transitional care unit   Social work consult  Daughter, Camille Ji, called and updated 4/23        Diet: Combination Diet Moderate Consistent Carb (60 g CHO per Meal) Diet; 2 gm NA Diet  Room Service    DVT Prophylaxis: Enoxaparin (Lovenox) SQ  Pruett Catheter: Not present  Lines: None     Cardiac Monitoring: None  Code Status: No CPR- Do NOT Intubate      Clinically Significant Risk Factors                  # Hypertension: Noted on problem list  # Chronic heart failure with preserved ejection fraction: heart failure noted on problem list and last echo with EF >50%      # DMII: A1C = 9.0 % (Ref range: <5.7 %) within past 6 months       # Financial/Environmental Concerns:           Disposition Plan     Medically Ready for Discharge: Anticipated in 2-4 Days             Edmond Moya MD  Hospitalist Service  [facility]  Securely message with  [employer] (more info)  Text page via [facility] Paging/Directory   ______________________________________________________________________    Interval History   First visit with patient yesterday.  Lying in bed, appears comfortable, no report of pain.  Good appetite, no new complaints.  Awaiting surgery, tentatively Thursday, 4/25.  Orthopedics consulted and following.    Physical Exam   Vital Signs: Temp: 98.4  F (36.9  C) Temp src: Oral BP: (!) 168/95 Pulse: 70   Resp: 16 SpO2: 99 % O2 Device: Nasal cannula Oxygen Delivery: 1.5 LPM  Weight: 162 lbs 4.14 oz    GENERAL awake and alert, finishing breakfast in no acute distress   LUNGS no wheezes or crackles  HEART S1, S2 with RRR  ABDOMEN soft, nontender  MUSCULOSKELETAL extremities without edema  SKIN warm and dry  NEURO moves upper and lower extremities spontaneously and to command  MENTAL STATUS answering questions and following simple commands    Medical Decision Making             Data     I have personally reviewed the following data over the past 24 hrs:    8.0  \   9.4 (L)   / 263     138 99 21.7 /  337 (H)   4.3 29 1.37 (H) \       Imaging results reviewed over the past 24 hrs:   No results found for this or any previous visit (from the past 24 hour(s)).

## 2024-04-23 NOTE — PROVIDER NOTIFICATION
Evening blood glucose of 52 15gm oral glucose given, rechecked for 48, 30gm glucose and 8 oz of OJ given,  recheck for 78 On call hospitalist notified.

## 2024-04-23 NOTE — PLAN OF CARE
Diagnosis: Left hip fx, OR Thursday, 4/18 heart cath  POD#:  Mental Status: A&O x 3 disoriented to time, intermittent confusion  Activity/dangle bedrest, taps  Diet:regular  Pain: scheduled tylenol  Pruett/Voiding: incontinent  Tele/Restraints/Iso: NA  02/LDA: PADILLA  D/C Date: ?  Other Info:

## 2024-04-23 NOTE — PLAN OF CARE
Goal Outcome Evaluation:       Pt A&Ox3, dis to time, VSS on 1.5L NC. Mod carb diet. BG checks done. Evening BG @52, PO Glucose gel given per protocol and recheck came back at 104. Bedrest. T&R, Taps system in place. Incontinent of B&B.  CMS intact. Pain controlled w/ scheduled Tylenol. R PIV SL. Plan for surgery thurs 04/25. Continue plan of care.

## 2024-04-23 NOTE — PLAN OF CARE
Goal Outcome Evaluation:    Summary:  Admitted on 4/17 from TCU w/ SOB and leg pain and swelling after an unwitnessed fall.  S/P L. femur fx 3/23 repair.   Left hip fx, OR Thursday, 4/18 heart cath    Date & Time: 4/22/24 3284-1188   Orientation: A&O x3, forgetful    Activity Level: Bedrest, T&R q2h   Fall Risk: Yes   Behavior & Aggression: Green   Pain Management: Scheduled tylenol, PRN oxycodone    ABNL VS/O2: VS on 1L NC    Tele: N/A   ABNL Lab/BG: Urea N 23.2, Cr 1.28, Hgb 10.1, BS  180, 77  Diet: Mod carb, calorie count    Bowel/Bladder: Incontinent   Drains/Devices: R PIV SL   Tests/Procedures: None   Anticipated DC Date: Pending surgery    Other Important Info:  Plan for surgery on Thursday 4/25.

## 2024-04-23 NOTE — PLAN OF CARE
SUMMARY: Admitted on 4/17 from TCU w/ SOB and leg pain and swelling after an unwitnessed fall.  S/P L. femur fx 3/23 repair   DATE & TIME: 4/22/24 11PM-7AM  Cognitive concerns/Orientation: Alert and oriented x4, forgetful, pleasant  BEHAVIOR & AGGRESSION TOOL COLOR: Green  ABNL VS/O2: VSS, on RA (hypertensive scheduled BP meds given)  MOBILITY: Bedrest Turn & Repo  PAIN MANAGEMENT: Oxycodone x1 given for hip pain  DIET: Mod Carb  BOWEL/BLADDER: Incontinent, External cath in place  ABNL LAB/BG: , Cr 1.28, UREA 23.2, Hgb 10.1  DRAIN/DEVICES: PIV SL  SKIN: Scattered bruising  TESTS/PROCEDURES: None today  Discharge Date: Plan for surgery on Thurs 4/25

## 2024-04-24 NOTE — PROGRESS NOTES
[facility]    Medicine Progress Note - Hospitalist Service    Date of Admission:  4/17/2024    Assessment & Plan   Tc Garcia is an 84 year old male with a past medical history of HFpEF, CKD, A-fib on AC, DM type I, diabetic nephropathy, HTN, pulmonary HTN, recent intracranial hemorrhage in 12/2023, recent L hip fracture, s/p IM nailing on 3/23/24, who was admitted on 4/17/2024 from TCU with shortness of breath, leg pain and swelling, and recent unwitnessed fall.      S/p fall, with left hip fracture  S/p L femur fx 3/23/24 repair  S/p left hip IM nail 3/23/24 at [place], presented from TCU with reported fall, SOB/, leg pain. XR w new left hip fracture.  Orthopedics consulted, anticipated surgery on Thursday, 4/25  NPO after midnight 4/24  Continue enoxaparin (Lovenox) for DVT prophylaxis, HOLD per orthopedic orders pending timing of surgery  Pain control, see hospital orders  AM labs as ordered    Diabetes mellitus with hyperglycemia, A1c 9% on 3/20/2024  Hypoglycemia, intermittent  * Unclear if type I or type II, both listed in notes. Diagnosed in his 60s but on minimal insulin and not PO medications, consistent with type I.   >250 on admit  * On lantus 8 units with humolog 4 units TID prior to admission   *Stress-induced hyperglycemia in the setting of diabetes mellitus and recent fracture  Continue long and short-acting insulin, adjust as needed, reassess daily; on/off hypoglycemia with protocol followed  Monitor blood sugars, fluctuating recently despite insulin dose adjustments; continue to monitor closely  3 AM blood sugar checks  Diabetic diet  Recent Labs   Lab 04/24/24  1127 04/24/24  0738 04/24/24  0119 04/23/24  2125 04/23/24  1834 04/23/24  1816   * 433* 263* 155* 104* 84     Chronic normocytic anemia  Possible vomiting of blood 4/17  Iron deficiency (See iron indices below)   With recent hip fracture, prior baseline hemoglobin 4/15 7.8 with an MCV 90.6.   On arrival  was 8.2, with shortness of breath, possibly due to volume overload vs symptomatic anemia.  * S/p 1 unit preop to get Hgb > 8 on 4/18 per ortho's recommendation. Wanted hgb > 8 prior to surgery.   Monitor for signs of GI bleeding; no recurrent symptoms reported  Iron deficient (see labs in Epic)  Venofer 200 mg daily x 5 days ordered on 04/19/24.   Recent Labs   Lab 04/24/24  0719 04/23/24  0754 04/22/24  0804 04/19/24  0801 04/18/24  1705 04/18/24  0733   HGB 10.0* 9.4* 10.1* 9.0* 8.1* 7.7*     Acute respiratory failure with hypoxia   Heart failure with preserved ejection fraction (HFpEF), suspected volume overload  Elevated troponin, likely demand ischemia  Mild pulmonary hypertension  Shortness of breath at presentation. CXR showed chronic pulmonary vascular congestion and changes in left chest.  BNP 5000, last BNP 8120 on 4/15 (being seen for chest pain in the ED, they felt he likely was volume overloaded and increased his diuretic to 20 daily.).    * S/p Lasix 20 IV in ED.    * Low suspicion of acute coronary syndrome; monitored  On ECHO, EF > 70%, flattened septum consistent with volume overload.  Mildly decreased RV systolic function.  Severe biatrial enlargement.  Moderate to severe TR with dilation of the IVC and abnormal respiratory variation.  And severe pulmonary hypertension.  R heart cath on 4/18/24 indicated just mild pulmonary HTN, with elevated left sided filling pressures and wedge of 21, c/w some degree of fluid overload.   Started torsemide 5 mg daily on 04/19/24. (Previously discussed with cardiology, recommended gentle diuresis for high wedge.)  Monitor labs  On 04/21/24, serum Cr improved, with diuresis.  Oxygen therapy, monitor saturations, titrate as needed; encourage spirometry  Continue torsemide as above  Encourage incentive spirometry  Continue PTA carvedilol 6.25 mg twice daily   Continue PTA amlodipine 5 mg QD   Continue PTA isosorbide 60 mg every day     Questionable community-acquired  pneumonia, low suspicion  * Noted to have elevated Pro-Hai and a white count of 20k, though felt could be demargination secondary to pain/fracture.  Afebrile.  CXR favored old opacity without clear infiltrate.  Elevated Pro-Hai cannot rule out mild community-acquired pneumonia.    Completed 5 day course of ceftriaxone on 04/21/24; monitor  Oxygen therapy as above  Recent Labs   Lab 04/24/24  0719 04/23/24  0754 04/18/24  0733 04/17/24  1544   WBC 9.2 8.0 13.5* 20.8*      Atrial fibrillation, previously on anticoagulation -  Stopped following traumatic brain bleed on in Dec 2023.   History of Traumatic ICH in 12/2023  History of completed stroke  * Admission [facility] without current bleed: CT head with small chronic lacunar infarct right caudate.  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   By report, cardiology considering [facility]man. See clinic neurology note 2/2/24 and clinic cardiology note 1/29/24 for details.   Anticoagulation cleared by NSG > However, extremely high fall risk with multiple hospitalizations secondary to falls, so holding until [facility], follow-up as outpatient.  PTA rate control with carvedilol (Coreg) as above     Chronic kidney disease 3b, hx nephrotic proteinuria  Monitor serum Cr, urine output, I/O's, daily weights  Started torsemide 5 mg daily on 4/19  Recent Labs   Lab 04/23/24  0754 04/22/24  0804 04/21/24  0758 04/20/24  0751 04/19/24  0801 04/18/24  0733   CR 1.37* 1.28* 1.37* 1.50* 1.63* 1.43*      Left leg swelling  Ultrasound 4/18: No evidence of deep venous thrombosis.  Complex fluid   collection in the left groin. This most likely represents a hematoma.   Orthopedics consulted as above, monitor    Benign prostatic hypertrophy   Continue tamsulosin (Flomax)      Acute metabolic encephalopathy, resolved  Previous cognitive decline  Mood disorder, with insomina  * Daughter notes that patient is at cognitive baseline, history of at times paranoia  delusions.  Last slums 17/30.  No formal dementia diagnosis but in DDx. Past stroke as above.  History of alcohol abuse possible contributes.   Continue PTA mirtazapine 15 mg at bedtime   PRN Roserem on HOLD   Maintain normal day/night, sleep wake cycles  Minimize sedating/altering medications as able  Quetiapine PRN for insomnia and agitation at night only, monitor     Recent C7 and T12 compression fracture with cervical canal stenosis on MRI during Februray  admission for left hip fracture.   Discharged on C-collar to be worn at all times until follow up x-rays and neurosurgical follow up, which was never done. No longer wearing c-collar.   No current report of neck pain, monitor  Neurosurgery consulted.  Appreciate neurosurgery evaluation by Shiela Warren PA-C on 4/18/24   Given he is having no neck pain, no further imaging or intervention.   Follow-up with primary clinic provider after discharge, monitor     Goals of care  Remains DNR/DNI.  Past provider discussed goals of care with patient, with daughter and wife present.  Patient wanted to continue restorative cares.  Family agrees with his decision.     Disposition  Estimated length of stay 3 to 4 days, pending hip surgery  Anticipated discharge to transitional care unit   Social work consult  Daughter, Camille Ji, called and updated 4/23        Diet: Combination Diet Moderate Consistent Carb (60 g CHO per Meal) Diet; 2 gm NA Diet  Room Service  NPO per Anesthesia Guidelines for Procedure/Surgery Except for: Meds, Ice Chips    DVT Prophylaxis: Enoxaparin (Lovenox) SQ  Pruett Catheter: Not present  Lines: None     Cardiac Monitoring: None  Code Status: No CPR- Do NOT Intubate      Clinically Significant Risk Factors                  # Hypertension: Noted on problem list  # Chronic heart failure with preserved ejection fraction: heart failure noted on problem list and last echo with EF >50%      # DMII: A1C = 9.0 % (Ref range: <5.7 %) within past 6 months        # Financial/Environmental Concerns: none         Disposition Plan     Medically Ready for Discharge: Anticipated in 2-4 Days             Edmond Moya MD  Hospitalist Service  [facility]  Securely message with [employer] (more info)  Text page via [employer] Paging/Directory   ______________________________________________________________________    Interval History   Lying in bed, pleasant and interactive, feeling good without pain.  Tentative surgery planned for tomorrow, 4/25.  Blood sugars continue to fluctuate, insulin adjusted.      Physical Exam   Vital Signs: Temp: 98.3  F (36.8  C) Temp src: Oral BP: (!) 147/87 (Nurse notified) Pulse: 82   Resp: 16 SpO2: 95 % O2 Device: Nasal cannula Oxygen Delivery: 1.5 LPM  Weight: 166 lbs 7.16 oz    GENERAL awake and alert, lying in bed, no acute distress   LUNGS no wheezes or crackles  HEART S1, S2 with RRR  ABDOMEN soft, nontender  MUSCULOSKELETAL extremities without edema  SKIN warm and dry  NEURO moves upper and lower extremities spontaneously and to command  MENTAL STATUS answering questions and following simple commands    Medical Decision Making             Data     I have personally reviewed the following data over the past 24 hrs:    9.2  \   10.0 (L)   / 246     N/A N/A N/A /  407 (H)   N/A N/A N/A \       Imaging results reviewed over the past 24 hrs:   No results found for this or any previous visit (from the past 24 hour(s)).

## 2024-04-24 NOTE — PLAN OF CARE
Diagnosis: Left hip fx, OR Thursday, 4/18 heart cath  POD#:  Mental Status: A&O x 3 disoriented to time, intermittent confusion  Activity/dangle bedrest, taps  Diet:regular  Pain: scheduled tylenol  Pruett/Voiding: incontinent/external catheter   Tele/Restraints/Iso: NA  02/LDA: PADILLA  D/C Date: OR tomorrow   Other Info:

## 2024-04-24 NOTE — CONSULTS
Care Management Initial Consult    General Information  Assessment completed with: Children, daughter Camille  Type of CM/SW Visit: Offer D/C Planning    Primary Care Provider verified and updated as needed: Yes   Readmission within the last 30 days: no previous admission in last 30 days      Reason for Consult: discharge planning  Advance Care Planning:            Communication Assessment  Patient's communication style: spoken language (English or Bilingual)             Cognitive  Cognitive/Neuro/Behavioral: .WDL except, orientation  Level of Consciousness: alert  Arousal Level: opens eyes spontaneously  Orientation: disoriented to, time  Mood/Behavior: calm, cooperative  Best Language: 0 - No aphasia  Speech: clear    Living Environment:   People in home: facility resident, spouse  wife  Current living Arrangements: assisted living  Name of Facility:  ([facility])   Able to return to prior arrangements: other (see comments) (unsure/ planned surgery tomorrow)       Family/Social Support:  Care provided by: self, other (see comments) ([facility] assists pt and wife)  Provides care for: no one, unable/limited ability to care for self  Marital Status:   Children, Facility resident(s)/Staff          Description of Support System: Supportive, Involved         Current Resources:   Patient receiving home care services: No     Community Resources:    Equipment currently used at home:    Supplies currently used at home: Diabetic Supplies    Employment/Financial:  Employment Status: retired        Financial Concerns: none           Does the patient's insurance plan have a 3 day qualifying hospital stay waiver?  No    Lifestyle & Psychosocial Needs:  Social Determinants of Health     Food Insecurity: Patient Unable To Answer (3/22/2024)    Received from [facility]    Hunger Vital Sign     Worried About Running Out of Food in the Last Year: Patient unable to answer     Ran Out  of Food in the Last Year: Patient unable to answer   Depression: Not at risk (2/2/2024)    PHQ-2     PHQ-2 Score: 0   Housing Stability: Patient Unable To Answer (3/22/2024)    Received from [facility]     Retired - What is your living situation today? : Patient unable to answer   Tobacco Use: Medium Risk (3/2/2024)    [facility]    Patient History     Smoking Tobacco Use: Former     Smokeless Tobacco Use: Never     Passive Exposure: Not on file   Financial Resource Strain: Low Risk  (10/16/2023)    Financial Resource Strain     Within the past 12 months, have you or your family members you live with been unable to get utilities (heat, electricity) when it was really needed?: No   Alcohol Use: Not At Risk (10/27/2020)    AUDIT-C     Frequency of Alcohol Consumption: Never     Average Number of Drinks: Not on file     Frequency of Binge Drinking: Not on file   Transportation Needs: Patient Unable To Answer (3/22/2024)    Received from [facility]    PRAPARE - Transportation     Lack of Transportation (Medical): Patient unable to answer     Lack of Transportation (Non-Medical): Patient unable to answer   Physical Activity: Not on file   Interpersonal Safety: Patient Declined (3/22/2024)    Received from [facility] IP Custom IPV     Do you feel UNSAFE in any of your personal relationships with your family members or any other acquaintances?: Deferred   Stress: Not on file   Social Connections: Not on file   Health Literacy: Not on file       Functional Status:  Prior to admission patient needed assistance:   Dependent ADLs:: Ambulation-walker  Dependent IADLs:: Cooking, Shopping, Meal Preparation, Medication Management, Transportation, Laundry  Assesssment of Functional Status: Not at  baseline with ADL Functioning, Not at baseline with mobility    Mental Health Status:  Mental Health Status: No Current Concerns       Chemical Dependency Status:                Values/Beliefs:  Spiritual, Cultural Beliefs, Anglican Practices, Values that affect care: no               Additional Information:  BAYRON RN states patient is sometimes confused so writer called main , patient's daughter Camille ([phone]) to do consult for discharge planning. She states she would like to be involved with patient's POC and discharge planning as she's the only adult child that lives here. Writer introduced self and role to Camille and she states that patient lives in [place] [facility] with her mother. She states they both receive a high level of care and assistance at facility with bathing, dressing, medication management and meals. She states the apartment has grab bars in the shower and toilet. She states her mother has early signs of dementia and isn't much help for the patient if he returns there. Camille states patient has discharged to TCU after every hospital visit. She states the only TCU aversion he has is to [place] where he had a bad experience. Camille states patient has a PCP/NP through [employer] that he sees but did not know her name. SW updated that patient will likely need TCU after another surgery tomorrow. CM/SW will continue to follow for safe discharge plan.       Nicolás Corrales RN  [facility]  Inpatient Care Management - FLOAT  CM RN Mobile: [phone] daily 7:30-4:00

## 2024-04-24 NOTE — PROGRESS NOTES
Orthopedic Surgery  Tc Garcia  04/24/2024     Admit Date:  4/17/2024    Acute, closed, comminuted, displaced left periprosthetic proximal femur fracture through the intertrochanteric region     Patient resting comfortably in bed.   Pain controlled to the left hip.  Tolerating oral intake.  On 1.5 L O2 via nasal cannula.  Patient eager to proceed with surgery tomorrow.  NAEO.    Due to OR and surgeon availability as the patient requires a joint revision specialist (Dr. Yahir Pulido), planning on left hip IM nail conversion to ANAI in the setting of a periprosthetic fracture on 4/25/2024.     Temp:  [97  F (36.1  C)-98.3  F (36.8  C)] 98.3  F (36.8  C)  Pulse:  [54-82] 82  Resp:  [16] 16  BP: (123-147)/(68-87) 147/87  SpO2:  [95 %-99 %] 95 %    Alert and oriented x 2. NAD. Frail.  Minimal ecchymosis of the surrounding skin of the left hip/thigh, no open wounds to examined areas.   Bilateral calves are soft, non-tender.  Left lower extremity is NVI.  No grimacing with passive ankle DF and PF, and great toe flexion and extension.  +Dp pulse    Labs:  Recent Labs   Lab Test 04/24/24  0719 04/23/24  0754 04/22/24  0804 04/18/24  1705 04/18/24  0733   WBC 9.2 8.0  --   --  13.5*   HGB 10.0* 9.4* 10.1*   < > 7.7*    263  --   --  286    < > = values in this interval not displayed.     Recent Labs   Lab Test 04/18/24  0733 12/11/23  1419 01/15/23  1321   INR 1.25* 1.35* 1.13     1. Plan:   Plan for left hip procedure on 4/25/24. Message out to hospitalist to ensure clearance.   NPO at midnight.  Lovenox held.  Type and screen updated.    Mobilize with PT/OT.    NWB LLE.   Continue current pain regimen.   Continue Q4H NV checks LLE.   Ortho will continue to follow.     2. Disposition:   Pending OR on 4/25/24 with Dr. Yahir Pulido, likely TCU.    Carolyn Gutierrez PA-C  [facility]

## 2024-04-24 NOTE — PROGRESS NOTES
Alert and oriented x 2-3. VSS on O2 at 1.5 lpm/NC.  CMS intact. Denied pain at rest, verbalizes some discomfort with T/R. Incontinent of B/B, voiding adequately, external cath in place. Attempted to get OOB 4X this shift, redirected. Blood sugar at HS and 0200H were 155 and 263. PIV SL. Plan for surgery on 4/25.

## 2024-04-25 PROBLEM — J96.01 ACUTE HYPOXIC RESPIRATORY FAILURE (H): Status: ACTIVE | Noted: 2024-01-01

## 2024-04-25 PROBLEM — S72.142A CLOSED COMMINUTED INTERTROCHANTERIC FRACTURE OF LEFT FEMUR, INITIAL ENCOUNTER (H): Status: ACTIVE | Noted: 2024-01-01

## 2024-04-25 PROBLEM — I50.9 ACUTE ON CHRONIC CONGESTIVE HEART FAILURE, UNSPECIFIED HEART FAILURE TYPE (H): Status: ACTIVE | Noted: 2024-01-01

## 2024-04-25 PROBLEM — Z86.79 HISTORY OF HEART FAILURE: Status: ACTIVE | Noted: 2024-01-01

## 2024-04-25 NOTE — PROGRESS NOTES
"   04/25/24 1300   Appointment Info   Signing Clinician's Name / Credentials (PT) Jose Angel Andrews DPT   Rehab Comments (PT) WBAT; no Hip IR, adduction past midline, and flex >90 deg   Living Environment   People in Home alone   Current Living Arrangements assisted living   Home Accessibility no concerns   Number of Stairs, Within Home, Primary   (13)   Transportation Anticipated health plan transportation   Living Environment Comments Pt appears to be a poor historian, follow up will be needed to determine accuracy of responses. Pt was at TCU prior to hospital admission. Pt appears to be a poor historian as reports living in a house with his spouse but chart states pt lives in [facility].   Self-Care   Usual Activity Tolerance moderate   Current Activity Tolerance fair   Regular Exercise No   Equipment Currently Used at Home walker, rolling   Fall history within last six months yes   Number of times patient has fallen within last six months 1   Activity/Exercise/Self-Care Comment Pt reports he sometimes requires assist with ADLs although does not specify when he needs assist. Pt ambulates w/ a FWW at baseline.   General Information   Onset of Illness/Injury or Date of Surgery 04/25/24   Referring Physician Ericka Oropeza PA-C   Patient/Family Therapy Goals Statement (PT) \"To get better\"   Pertinent History of Current Problem (include personal factors and/or comorbidities that impact the POC) Per Chart: s/p L ANAI POD#0   Existing Precautions/Restrictions fall;no hip IR;no hip ADD past midline;90 degree hip flexion   Weight-Bearing Status - LLE weight-bearing as tolerated   Weight-Bearing Status - RLE full weight-bearing   Cognition   Orientation Status (Cognition) oriented x 3   Cognitive Status Comments Pt though the year was 2017   Pain Assessment   Patient Currently in Pain Yes, see Vital Sign flowsheet  (2/10 in surgical site)   Integumentary/Edema   Integumentary/Edema Comments Incision on L hip with dressing intact "   Posture    Posture Protracted shoulders;Forward head position   Range of Motion (ROM)   Range of Motion ROM is WFL;ROM deficits secondary to surgical procedure;ROM deficits secondary to pain;ROM deficits secondary to swelling;ROM deficits secondary to weakness   ROM Comment LLE ROM limited due surgical procedure on L hip   Strength (Manual Muscle Testing)   Strength (Manual Muscle Testing) Deficits observed during functional mobility;Able to perform R SLR   Strength Comments RLE Hip Flexion: 3/5; LLE Hip Flexion: 2/5   Bed Mobility   Comment, (Bed Mobility) Supine>sit w/ max A x 1   Transfers   Comment, (Transfers) Sit>stand w/ FWW and max A x 2   Gait/Stairs (Locomotion)   Comment, (Gait/Stairs) Unable to initiate   Balance   Balance Comments Adequate static sitting balance; impaired standing balance with FWW   Sensory Examination   Sensory Perception patient reports no sensory changes   Clinical Impression   Criteria for Skilled Therapeutic Intervention Yes, treatment indicated   PT Diagnosis (PT) Impaired gait   Influenced by the following impairments Decreased activity tolerance; decreased balance; decreased strength   Functional limitations due to impairments Impaired functional mobility   Clinical Presentation (PT Evaluation Complexity) stable   Clinical Presentation Rationale Clinical judgement   Clinical Decision Making (Complexity) low complexity   Planned Therapy Interventions (PT) balance training;bed mobility training;gait training;home exercise program;neuromuscular re-education;ROM (range of motion);strengthening;stretching;transfer training;progressive activity/exercise   Risk & Benefits of therapy have been explained evaluation/treatment results reviewed;care plan/treatment goals reviewed;risks/benefits reviewed;current/potential barriers reviewed;participants voiced agreement with care plan;participants included;patient   PT Total Evaluation Time   PT Eval, Low Complexity Minutes (46137) 10    Physical Therapy Goals   PT Frequency Daily   PT Predicted Duration/Target Date for Goal Attainment 04/30/24   PT Goals Bed Mobility;Transfers;Gait;Stairs   PT: Bed Mobility Supervision/stand-by assist;Supine to/from sit;Within precautions   PT: Transfers Supervision/stand-by assist;Sit to/from stand;Assistive device;Within precautions   PT: Gait Supervision/stand-by assist;Assistive device;Within precautions;100 feet   PT: Stairs Supervision/stand-by assist;Within precautions;Greater than 10 stairs;Rail on right   Interventions   Interventions Quick Adds Therapeutic Activity;Therapeutic Procedure   Therapeutic Procedure/Exercise   Ther. Procedure: strength, endurance, ROM, flexibillity Minutes (33282) 4   Symptoms Noted During/After Treatment increased pain   Treatment Detail/Skilled Intervention Pt completed the following supine LE exercises x 10 reps to improve strength and functional mobility: ankle pumps, quad sets, and glute sets. Verbal and tactile cues for technique. Pt tolerated well.   Therapeutic Activity   Therapeutic Activities: dynamic activities to improve functional performance Minutes (26508) 24   Symptoms Noted During/After Treatment Fatigue;Increased pain   Treatment Detail/Skilled Intervention Greeted pt supine in bed, agreed to PT. VSS on RA throughout session. PT educated pt on WBAT status, hip precautions, AD use, walking program and elevation/icing regiemen. Pt voiced understanding but needed repeated education for hip precautions as pt could not recall. Pt performed supine>sit w/ max A x 1, needing assist to safely lift LLE off of bed and trunk control. Pt unable to volitionally move LLE against gravity at this time. Pt able to scoot self to EOB but with increased difficulty. Pt able to sit at EOB unsupported without LOB. Pt performed sit>stand x 2 w/ FWW and max A x 2 from elevated bed height, needing assist to stand fully upright. Multiple attempts required to get to standing. Pt  unsteady in standing, presenting with flexed knee posture, unable to extend knees. Pt unable to tolerate further activity citing fatigue. Pt returned to supine w/ max A x 2, needing assist to safely lift LLE back into bed and reposition. Total assist to boost up in bed. Pt ended session supine in bed, with all needs met and call light within reach.   PT Discharge Planning   PT Plan Review hip precautions; bed mobility; sit>stand w/ FWW vs SS; pre-gait exercises; gait w/ FWW and WC follow as able   PT Discharge Recommendation (DC Rec)   (Defer to ortho)   PT Rationale for DC Rec Pt is below baseline. Pt currently requiring heavy A x 2 with all functional mobility. Pt presents with deficits in activity tolerance, balance, and strength. Due to these deficits, pt would benefit from continued skilled PT services via TCU to address deficits and improve IND with safety and functional mobility.   PT Brief overview of current status Max A x 1 for bed mobility; max A x 2 for sit>stand   Total Session Time   Timed Code Treatment Minutes 28   Total Session Time (sum of timed and untimed services) 38

## 2024-04-25 NOTE — PROGRESS NOTES
Diagnosis: Left hip fx  POD#: surgery today 0730  Mental Status: A&Ox3 not to time, forgetful at times   Activity/dangle bedrest, t/r with taps   Diet: NPO after midnight   Pain: nothing given for pain    Pruett/Voiding: external cath in place  Tele/Restraints/Iso: NA  02/LDA: 1.5 L NC, SL  D/C Date: TBD  Other Info: Right arm skin tear covered with steri strips, left neck tegaderm from cardiac cath procedure,  CHG wipes done, down for surgery

## 2024-04-25 NOTE — PROGRESS NOTES
"Pt. Discharged PACU:     Vitals: WNL on 2L Oxygen.   Lungs: Crackles to bases on the right. 2L Oxygen. Required encouragement for cough and deep breathing.   CV: Afib CVR at baseline   GI: Tolerating ice chips, declined water and applesauce. Denies nausea.   Uro: Due to void. External cho in place. Noted straigth cath done early AM on floor, clear blood ting drainage noted on penis.   CMS: Weak pedal pulses, movement to left side greater than right. Cool/warm. Reports numbness to left side resolving.   Neuro: Generalized weakness. Left lower leg greater than right. Speech remains slightly garbled. Dry mouth?   Skin: Scattered scabs and bruising to all extremities. Pink and blanchable to coccyx, mepilex applied.   Psych: Calm and cooperative. Mentation waxing and waning. Confused on year and at times place and situation. Did mention trains and being with his mom at one point.   MSK: Bedrest. Hip restrictions in place.   Pain: \"Dull aching\" to left hip upon discharge. IV dilaudid used post surgery.   Labs: - do not treat in PACU. Floor to manage other values.   Tests/Procedures: POD 0 left total hip.   Ok to transfer. Report given. Daughter updated.   "

## 2024-04-25 NOTE — ANESTHESIA PREPROCEDURE EVALUATION
Anesthesia Pre-Procedure Evaluation    Patient: Tc Garcia   MRN: [id number] : 1940        Procedure : Procedure(s):  Left total hip arthroplasty revision          Past Medical History:   Diagnosis Date    Anemia     Anemia of chronic disease 2020    Back pain     CKD (chronic kidney disease) stage 3, GFR 30-59 ml/min (H)     CRF (chronic renal failure), stage 3  2020    Fall 2019    suffered multiple left rib fractures, C3 and T2 fractures    Mixed hyperlipidemia 2004    Paroxysmal atrial fibrillation (H)     Personal history of colonic polyps     gets colonoscopy every five years, due in     Pleural effusion on left 2019    after multiple rib fractures    Pulmonary hypertension (H) 5/10/2020    Added automatically from request for surgery [id number]    Recurrent Left Pleural effusion -- S/P Pleurex Cath 2019    Rosacea     Type II or unspecified type diabetes mellitus without mention of complication, not stated as uncontrolled     Unspecified essential hypertension       Past Surgical History:   Procedure Laterality Date    ANESTHESIA CARDIOVERSION N/A 2/3/2020    Procedure: ANESTHESIA, FOR CARDIOVERSION;  Surgeon: GENERIC ANESTHESIA PROVIDER;  Location:  OR    CV RIGHT HEART CATH MEASUREMENTS RECORDED N/A 2020    Procedure: Right Heart Cath;  Surgeon: Senthil Silva MD;  Location:  HEART CARDIAC CATH LAB    CV RIGHT HEART CATH MEASUREMENTS RECORDED N/A 2024    Procedure: Right Heart Cath;  Surgeon: Deja Wall MD;  Location:  HEART CARDIAC CATH LAB    HC REMOVE TONSILS/ADENOIDS,<12 Y/O      T & A <11y.o.    IR CHEST TUBE DRAIN TUNNELED LEFT  2020    IR CHEST TUBE PLACEMENT NON-TUNNELLED LEFT  2020    IR CHEST TUBE REMOVAL NON TUNNELED LEFT  2020    IR CHEST TUBE REMOVAL TUNNELED LEFT  2020    LAPAROSCOPIC CHOLECYSTECTOMY N/A 2020    Procedure: CHOLECYSTECTOMY, LAPAROSCOPIC;  Surgeon: Fausto  MD Annette;  Location:  OR    LAPAROSCOPIC HERNIORRHAPHY INGUINAL BILATERAL Bilateral 10/27/2020    Procedure: LAPAROSCOPIC BILATERAL INGUINAL HERNIA REPAIR WITH MESH;  Surgeon: Brian Garsia MD;  Location:  OR      Allergies   Allergen Reactions    No Known Drug Allergy       Social History     Tobacco Use    Smoking status: Former     Current packs/day: 0.00     Average packs/day: 0.2 packs/day for 40.0 years (8.0 ttl pk-yrs)     Types: Cigarettes     Start date:      Quit date: 2008     Years since quittin.3    Smokeless tobacco: Never   Substance Use Topics    Alcohol use: Not Currently     Alcohol/week: 35.0 standard drinks of alcohol      Wt Readings from Last 1 Encounters:   24 75.5 kg (166 lb 7.2 oz)        Anesthesia Evaluation   Pt has had prior anesthetic.     History of anesthetic complications       ROS/MED HX  ENT/Pulmonary: Comment: Recurrent left plural effusion    (+)                          COPD,           (-) sleep apnea   Neurologic:     (+)   dementia,                             Cardiovascular:     (+)  hypertension- -   -  - -      CHF     LUCIANO.             dysrhythmias, a-fib,       pulmonary hypertension,      METS/Exercise Tolerance:     Hematologic:       Musculoskeletal: Comment: C6 and t1 fracture, no longer immobilized      GI/Hepatic:       Renal/Genitourinary:     (+) renal disease, type: CRI,            Endo:     (+)  type II DM,                    Psychiatric/Substance Use:       Infectious Disease:       Malignancy:       Other:            Physical Exam    Airway        Mallampati: I   TM distance: > 3 FB   Neck ROM: full   Mouth opening: > 3 cm    Respiratory Devices and Support         Dental       (+) Multiple visibly decayed, broken teeth      Cardiovascular   cardiovascular exam normal          Pulmonary           (+) decreased breath sounds           OUTSIDE LABS:  CBC:   Lab Results   Component Value Date    WBC 9.2 2024    WBC 8.0  04/23/2024    HGB 10.0 (L) 04/24/2024    HGB 9.4 (L) 04/23/2024    HCT 33.4 (L) 04/24/2024    HCT 31.5 (L) 04/23/2024     04/24/2024     04/23/2024     BMP:   Lab Results   Component Value Date     04/23/2024     04/22/2024    POTASSIUM 4.3 04/23/2024    POTASSIUM 4.4 04/22/2024    CHLORIDE 99 04/23/2024    CHLORIDE 97 (L) 04/22/2024    CO2 29 04/23/2024    CO2 26 04/22/2024    BUN 21.7 04/23/2024    BUN 23.2 (H) 04/22/2024    CR 1.37 (H) 04/23/2024    CR 1.28 (H) 04/22/2024     (H) 04/25/2024    GLC 97 04/25/2024     COAGS:   Lab Results   Component Value Date    PTT 34 12/11/2023    INR 1.25 (H) 04/18/2024     POC:   Lab Results   Component Value Date     (H) 03/19/2021     HEPATIC:   Lab Results   Component Value Date    ALBUMIN 4.0 03/18/2024    PROTTOTAL 6.1 (L) 03/18/2024    ALT 18 03/18/2024    AST 18 03/18/2024    GGT 34 11/07/2019    ALKPHOS 114 03/18/2024    BILITOTAL 0.4 03/18/2024    CT 15 (L) 12/12/2023     OTHER:   Lab Results   Component Value Date    PH 7.34 01/29/2023    LACT 0.7 04/18/2024    A1C 9.0 (H) 03/20/2024    LLOYD 8.9 04/23/2024    PHOS 3.5 12/28/2023    MAG 1.9 12/28/2023    LIPASE 7 (L) 10/26/2023    TSH 2.41 03/13/2024    T4 1.11 08/10/2020    CRP 18.8 (H) 07/30/2020    SED 47 (H) 07/30/2020       Anesthesia Plan    ASA Status:  3       Anesthesia Type: General.     - Airway: ETT   Induction: Intravenous.   Maintenance: Balanced.   Techniques and Equipment:     - Airway: Video-Laryngoscope     - Lines/Monitors: 2nd IV     Consents    Anesthesia Plan(s) and associated risks, benefits, and realistic alternatives discussed. Questions answered and patient/representative(s) expressed understanding.     - Discussed:     - Discussed with:  Patient       - Patient is DNR/DNI Status: Yes             Suspend during perioperative period? Yes (patient does not want cpr or shocks).             Agree to: chemical resuscitation.        Postoperative  Care    Pain management: IV analgesics, Oral pain medications.   PONV prophylaxis: Ondansetron (or other 5HT-3), Dexamethasone or Solumedrol, Background Propofol Infusion     Comments:    Other Comments: Discussed dnr/dni. Patient is okay with intubation for the procedure.  He dopes not want heroic measures oar prolonged rescusitation.  Chemical treatment is okay, but not cpr or shocks           Delia Caldwell    I have reviewed the pertinent notes and labs in the chart from the past 30 days and (re)examined the patient.  Any updates or changes from those notes are reflected in this note.

## 2024-04-25 NOTE — BRIEF OP NOTE
[facility]    Brief Operative Note    Pre-operative diagnosis: Other mechanical complication of internal left hip prosthesis, initial encounter (H24) [T89.505J]  Post-operative diagnosis Failed IMN L hip    Procedure: Left total hip arthroplasty revision, Left - Hip    Surgeon: Surgeons and Role:     * Yahir Pulido MD - Primary     * Ericka Oropeza PA-C - Assisting  Anesthesia: Spinal   Estimated Blood Loss: 200 ml    Drains: None  Specimens: * No specimens in log *  Findings:   None.  Complications: None.  Implants:   Implant Name Type Inv. Item Serial No.  Lot No. LRB No. Used Action   DISTAL SCREW      Left 1 Explanted   TROCHANTERIC SCREW      Left 1 Explanted   LEG SCREW      Left 1 Explanted   IMP CABLE DALL MILES BEADED CBL W/SLEEVE SET 2MM 6704-0-520 - [id number] Metallic Hardware/Lawn IMP CABLE DALL MILES BEADED CBL W/SLEEVE SET 2MM 6704-0-520  [employer] [id number] Left 3 Implanted   IMP STEM FEM RSTRTN MOD CONICAL DISTAL [id number] 6276-7-018 - [id number] Total Joint Component/Insert IMP STEM FEM RSTRTN MOD CONICAL DISTAL [id number] 6276-7-018  [employer] [id number] Left 1 Implanted   IMP INSERT SLEEVE STRK UNITRAX V40 +0MM - [id number] Total Joint Component/Insert IMP INSERT SLEEVE STRK UNITRAX V40 +0MM  [employer] [id number] Left 1 Implanted   IMP HEAD STRK ENDO UNITRAX MODULAR 54MM - [id number] Total Joint Component/Insert IMP HEAD STRK ENDO UNITRAX MODULAR 54MM  [employer] [id number] Left 1 Implanted   IMP STEM FEM MOD REV HIP PROX BODY/BOLT STD 25MM 6276-1-025 - [id number] Total Joint Component/Insert IMP STEM FEM MOD REV HIP PROX BODY/BOLT STD 25MM 6276-1-025  [employer] [id number] Left 1 Implanted

## 2024-04-25 NOTE — ANESTHESIA PROCEDURE NOTES
Airway       Patient location during procedure: OR       Procedure Start/Stop Times: 4/25/2024 7:57 AM  Staff -        Performed By: CRNA  Consent for Airway        Urgency: elective  Indications and Patient Condition       Indications for airway management: varghese-procedural       Induction type:intravenous       Mask difficulty assessment: 1 - vent by mask    Final Airway Details       Final airway type: endotracheal airway       Successful airway: ETT - single and Oral  Endotracheal Airway Details        ETT size (mm): 8.0       Cuffed: yes       Successful intubation technique: video laryngoscopy       VL Blade Size: Glidescope 4       Grade View of Cords: 1       Adjucts: stylet       Position: Left       Measured from: lips       Secured at (cm): 23       Bite block used: None    Post intubation assessment        Placement verified by: capnometry, equal breath sounds and chest rise        Number of attempts at approach: 1       Secured with: tape       Ease of procedure: easy       Dentition: Intact and Unchanged    Medication(s) Administered   Medication Administration Time: 4/25/2024 7:57 AM

## 2024-04-25 NOTE — PROGRESS NOTES
[facility]    Medicine Progress Note - Hospitalist Service    Date of Admission:  4/17/2024    Assessment & Plan   Tc Garcia is an 84 year old male with a past medical history of HFpEF, CKD, A-fib on AC, DM type I, diabetic nephropathy, HTN, pulmonary HTN, recent intracranial hemorrhage in 12/2023, recent L hip fracture, s/p IM nailing on 3/23/24, who was admitted on 4/17/2024 from TCU with shortness of breath, leg pain and swelling, and recent unwitnessed fall.      S/p fall, with left hip fracture  S/p L femur fx 3/23/24 repair  S/p L total hip arthroplasty revision 4/25/245, Dr. Pulido  S/p left hip IM nail 3/23/24 at [facility], presented from TCU with reported fall, SOB/, leg pain. XR w new left hip fracture.  *Surgery 4/25 with Dr. Pulido  Orthopedics consulted, ongoing follow-up   Resume diabetic diet after surgery  Enoxaparin (Lovenox) for DVT prophylaxis recently on HOLD, resuming or alternative prophylaxis as per orthopedic surgery team  Pain control, see hospital orders  AM labs    Diabetes mellitus with hyperglycemia, A1c 9% on 3/20/2024  Hypoglycemia, intermittent  * Unclear if type I or type II, both listed in notes. Diagnosed in his 60s but on minimal insulin and not PO medications, consistent with type I.   >250 on admit  * On lantus 8 units with humolog 4 units TID prior to admission   *Stress-induced hyperglycemia in the setting of diabetes mellitus and recent fracture  Continue long and short-acting insulin, adjust as needed, reassess daily; on/off hypoglycemia with protocol followed  Monitor blood sugars, fluctuating recently despite insulin dose adjustments; continue to monitor closely  3 AM blood sugar checks daily  Diabetic diet  Recent Labs   Lab 04/25/24  1028 04/25/24  0925 04/25/24  0844 04/25/24  0551 04/25/24  0223 04/24/24  2123   * 238* 285* 201* 97 220*     Chronic normocytic anemia  Possible vomiting of blood 4/17  Iron deficiency (See iron indices  below)   With recent hip fracture, prior baseline hemoglobin 4/15 7.8 with an MCV 90.6.   On arrival was 8.2, with shortness of breath, possibly due to volume overload vs symptomatic anemia.  * S/p 1 unit preop to get Hgb > 8 on 4/18 per ortho's recommendation. Wanted hgb > 8 prior to surgery.   Monitor for signs of GI bleeding; no recurrent symptoms reported  Iron deficient (see labs in Epic)  Venofer 200 mg daily x 5 days ordered on 04/19/24.   Recent Labs   Lab 04/24/24  0719 04/23/24  0754 04/22/24  0804 04/19/24  0801 04/18/24  1705   HGB 10.0* 9.4* 10.1* 9.0* 8.1*     Acute respiratory failure with hypoxia   Heart failure with preserved ejection fraction (HFpEF), suspected volume overload  Elevated troponin, likely demand ischemia  Mild pulmonary hypertension  Shortness of breath at presentation. CXR showed chronic pulmonary vascular congestion and changes in left chest.  BNP 5000, last BNP 8120 on 4/15 (being seen for chest pain in the ED, they felt he likely was volume overloaded and increased his diuretic to 20 daily.).    * S/p Lasix 20 IV in ED.    * Low suspicion of acute coronary syndrome; monitored  On ECHO, EF > 70%, flattened septum consistent with volume overload.  Mildly decreased RV systolic function.  Severe biatrial enlargement.  Moderate to severe TR with dilation of the IVC and abnormal respiratory variation.  And severe pulmonary hypertension.  R heart cath on 4/18/24 indicated just mild pulmonary HTN, with elevated left sided filling pressures and wedge of 21, c/w some degree of fluid overload.   Started torsemide 5 mg daily on 04/19/24. (Previously discussed with cardiology, recommended gentle diuresis for high wedge.)  Monitor labs  On 04/21/24, serum Cr improved, with diuresis.  Oxygen therapy, monitor saturations, titrate as needed; encourage spirometry  Continue torsemide as above  Encourage incentive spirometry  Continue PTA carvedilol 6.25 mg twice daily   Continue PTA amlodipine 5 mg  QD   Continue PTA isosorbide 60 mg every day     Questionable community-acquired pneumonia, low suspicion  * Noted to have elevated Pro-Hai and a white count of 20k, though felt could be demargination secondary to pain/fracture.  Afebrile.  CXR favored old opacity without clear infiltrate.  Elevated Pro-Hai cannot rule out mild community-acquired pneumonia.    Completed 5 day course of ceftriaxone on 04/21/24; monitor  Oxygen therapy as above  Recent Labs   Lab 04/24/24  0719 04/23/24  0754   WBC 9.2 8.0      Atrial fibrillation, previously on anticoagulation -  Stopped following traumatic brain bleed on in Dec 2023.   History of Traumatic ICH in 12/2023  History of completed stroke  * Admission [facility] without current bleed: CT head with small chronic lacunar infarct right caudate.  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   By report, cardiology considering [facility]. See clinic neurology note 2/2/24 and clinic cardiology note 1/29/24 for details.   Anticoagulation cleared by NSG > However, extremely high fall risk with multiple hospitalizations secondary to falls, so holding until [facility], follow-up as outpatient.  PTA rate control with carvedilol (Coreg) as above     Chronic kidney disease 3b, hx nephrotic proteinuria  Monitor serum Cr, urine output, I/O's, daily weights  Started torsemide 5 mg daily on 4/19  Recent Labs   Lab 04/23/24  0754 04/22/24  0804 04/21/24  0758 04/20/24  0751 04/19/24  0801   CR 1.37* 1.28* 1.37* 1.50* 1.63*      Left leg swelling  Ultrasound 4/18: No evidence of deep venous thrombosis.  Complex fluid   collection in the left groin. This most likely represents a hematoma.   Orthopedics consulted as above, monitor    Benign prostatic hypertrophy   Continue tamsulosin (Flomax)      Acute metabolic encephalopathy, resolved  Previous cognitive decline  Mood disorder, with insomina  * Daughter notes that patient is at cognitive baseline, history of at  times paranoia delusions.  Last slums 17/30.  No formal dementia diagnosis but in DDx. Past stroke as above.  History of alcohol abuse possible contributes.   Continue PTA mirtazapine 15 mg at bedtime   PRN Roserem on HOLD   Maintain normal day/night, sleep wake cycles  Minimize sedating/altering medications as able  Quetiapine PRN for insomnia and agitation at night only, monitor     Recent C7 and T12 compression fracture with cervical canal stenosis on MRI during [facility]  admission for left hip fracture.   Discharged on C-collar to be worn at all times until follow up x-rays and neurosurgical follow up, which was never done. No longer wearing c-collar.   No current report of neck pain, monitor  Neurosurgery consulted.  Appreciate neurosurgery evaluation by Shiela Warren PA-C on 4/18/24   Given he is having no neck pain, no further imaging or intervention.   Follow-up with primary clinic provider after discharge, monitor     Goals of care  Remains DNR/DNI.  Past provider discussed goals of care with patient, with daughter and wife present.  Patient wanted to continue restorative cares.  Family agrees with his decision.     Disposition  Estimated length of stay 2 to 3 days  Anticipated discharge to transitional care unit   Social work consult  DaughterCamille, called and updated 4/25        Diet: Room Service  Advance Diet as Tolerated: Regular Diet Adult    DVT Prophylaxis: Enoxaparin (Lovenox) SQ  Pruett Catheter: Not present  Lines: None     Cardiac Monitoring: None  Code Status: No CPR- Do NOT Intubate      Clinically Significant Risk Factors                  # Hypertension: Noted on problem list  # Chronic heart failure with preserved ejection fraction: heart failure noted on problem list and last echo with EF >50%      # DMII: A1C = 9.0 % (Ref range: <5.7 %) within past 6 months       # Financial/Environmental Concerns: none         Disposition Plan     Medically Ready for Discharge: Anticipated in 2-4  Days             Edmond Moya MD  Hospitalist Service  [facility]  Securely message with [employer] (more info)  Text page via [employer] Paging/Directory   ______________________________________________________________________    Interval History   Lying in bed, surgery left hip this morning, mild discomfort reported.  No increased shortness of breath, on NC oxygen 2L.  No new complaints.    Physical Exam   Vital Signs: Temp: 97.7  F (36.5  C) Temp src: Oral BP: 120/74 Pulse: 83   Resp: 15 SpO2: 100 % O2 Device: Nasal cannula Oxygen Delivery: 2 LPM  Weight: 166 lbs 7.16 oz    GENERAL awake and alert, pleasant and interactive, lying in bed   LUNGS no wheezes or crackles  HEART S1, S2 with RRR  ABDOMEN soft, nontender  MUSCULOSKELETAL extremities without edema  SKIN warm and dry; left hip incision bandaged, as per orthopedics**  NEURO moves upper and lower extremities spontaneously and to command  MENTAL STATUS answering questions and following simple commands    Medical Decision Making             Data         Imaging results reviewed over the past 24 hrs:   Recent Results (from the past 24 hour(s))   XR Pelvis w Hip Port Left  1 View    Narrative    Examination:  XR PELVIS AND HIP PORTABLE LEFT 1 VIEW    Date:  4/25/2024 10:55 AM     Clinical Information: Status post Hip surgery    Comparison: 4/17/2024.      Impression    Impression:     1.  Left hip revision with placement of a new left hip arthroplasty,  including explantation of previously placed intramedullary nail and  proximal and distal screws, and placement of a longstem unipolar  femoral component, with multiple cerclage wires encircling the  proximal femur. Fracture components are reduced to anatomic position  alignment. The femoral component appears well seated. No new fracture  or other immediate postoperative complication. No acute fracture or  bone lesion in the pelvis or right hip. Severe arterial  calcifications.  Postoperative air in the soft tissues of the left hip.    BROCK SARAH MD         SYSTEM ID:  [id number]

## 2024-04-25 NOTE — OR NURSING
Anesthesiologst, Dr. Caldwell conversed with pt in pre-op room and discussd waiving the DNI for today's surgical procedure. Pt affirmed the plan to waive the DNI for the surgery. I was present for the discussion.

## 2024-04-25 NOTE — ANESTHESIA POSTPROCEDURE EVALUATION
Patient: Tc Garcia    Procedure: Procedure(s):  Left total hip arthroplasty revision       Anesthesia Type:  General    Note:  Disposition: Inpatient   Postop Pain Control: Uneventful            Sign Out: Well controlled pain   PONV: No   Neuro/Psych: Uneventful            Sign Out: Acceptable/Baseline neuro status   Airway/Respiratory: Uneventful            Sign Out: Acceptable/Baseline resp. status   CV/Hemodynamics: Uneventful            Sign Out: Acceptable CV status; No obvious hypovolemia; No obvious fluid overload   Other NRE: NONE   DID A NON-ROUTINE EVENT OCCUR?            Last vitals:  Vitals Value Taken Time   /64 04/25/24 1200   Temp 36.3  C (97.3  F) 04/25/24 1200   Pulse 86 04/25/24 1217   Resp 26 04/25/24 1217   SpO2 96 % 04/25/24 1216   Vitals shown include unfiled device data.    Electronically Signed By: Delia Caldwell  April 25, 2024  3:38 PM

## 2024-04-25 NOTE — PROGRESS NOTES
Arrived from Recovery room.  Oriented x2, somnolent, able to make needs known.  Moderate pain on L hip, ice pack applied.  Daughter at the bedside.

## 2024-04-25 NOTE — ANESTHESIA CARE TRANSFER NOTE
Patient: Tc Garcia    Procedure: Procedure(s):  Left total hip arthroplasty revision       Diagnosis: Other mechanical complication of internal left hip prosthesis, initial encounter (H24) [T84.207R]  Diagnosis Additional Information: No value filed.    Anesthesia Type:   General     Note:    Oropharynx: oropharynx clear of all foreign objects  Level of Consciousness: drowsy  Oxygen Supplementation: face mask  Level of Supplemental Oxygen (L/min / FiO2): 6  Independent Airway: airway patency satisfactory and stable  Dentition: dentition unchanged  Vital Signs Stable: post-procedure vital signs reviewed and stable  Report to RN Given: handoff report given  Patient transferred to: PACU  Comments: To PACU: Arouses easily, good airway, 02 face mask, VSS  Report to RN  Handoff Report: Identifed the Patient, Identified the Reponsible Provider, Reviewed the pertinent medical history, Discussed the surgical course, Reviewed Intra-OP anesthesia mangement and issues during anesthesia, Set expectations for post-procedure period and Allowed opportunity for questions and acknowledgement of understanding      Vitals:  Vitals Value Taken Time   /73 04/25/24 1017   Temp     Pulse 76 04/25/24 1021   Resp 28 04/25/24 1021   SpO2 100 % 04/25/24 1021   Vitals shown include unfiled device data.    Electronically Signed By: OMAR Arredondo CRNA  April 25, 2024  10:23 AM

## 2024-04-25 NOTE — OP NOTE
Preoperative diagnosis:  Failed intramedullary nail left hip     Postoperative diagnosis:  As above     Procedure:  Conversion left hip hemiarthroplasty     Surgeon:  Yahir Pulido MD     Assistant:  Ericka Oropeza PA-C  A physician assistant was available for the case and participated to decrease the patient's morbidity with positioning, retraction during the procedure, manipulation of the limb and closure of the surgical wound.     Anesthesia:  General     Estimated blood loss:  200 cc     Complications:  None     Indication for procedure:  Tc Garcia is a 84 year old year old male who had previously undergone IM nail of the left hip for IT fracture several weeks prior.  Unfortunately, he sustained a fall while in rehab and developed increasingly severe hip pain.  He was transferred to the hospital and found to have displacement of the related nail with a spiral fracture of the greater trochanter destabilizing the fixation construct.  As such, the risks, benefits and alternatives to proceeding with conversion total hip arthroplasty were discussed with the patient.  This included but was not limited to continued postoperative pain, intraoperative fracture necessitating further surgery, failure of ingrowth components, dislocating hip, leg-length discrepancy and thromboembolic events. The patient expressed understand the risks and stated his preference to proceed with surgery today.     Description of the procedure:  Tc was met in the preoperative area by myself. Informed consent was obtained as outlined above. They were in agreement. Thus initials were placed on the left leg indicating the correct surgical site. The patient was then brought to the operating room where a general anesthetic was induced by the anesthesia service. This was successful. The patient was placed lateral on the operating table ensuring all bony prominences well padded. The pelvis was positioned with a hip positioner. The left hip was then  prepped and draped in sterile fashion time-out was called by the surgical team. The correct patient, hospital identification number, procedure to be performed and laterality were confirmed. All in attendance were in agreement.      A posterior approach to the hip was utilized. Curvilinear incision was created over the distal 3rd of the greater trochanter in line with the femur. Hemostasis was achived with Bovie electrocautery. The gluteal fascia was encountered and this was incised in line with the fibers. IT band was split distally. The hip was then internally rotated to expose the posterior structures.   The piriformis and posterior capsule was released with Bovie electrocautery. These were tagged for later repair. The hip was then dislocated. Attention was turned to hardware removal of the intramedullary nail.  A prophylactic cerclage cable was placed distal to the fracture to prevent any further fracture propagation.     The lateral aspect of the lag screw was identified and the screw extraction  was affixed to the lag screw.  The gluteus medius was split over the greater trochanter to allow access to the IM nail. The set screw was loosened and the extraction bolt applied to the proximal aspect of the nail. The distal interlock was removed and the nail was subsequently removed with minimal damage to the femur. Attention was turned to reconstruction of the hip with a diaphyseal engaging femoral stem.     Inspection of the acetabulum demonstrated intact cartilage.  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 The diaphysis of the femur was subsequently reamed to accept a size  18 x 115 mm restoration modular stem.  The stem was then impacted into place.  The proximal femur was prepared to accept a 25 mm body.  Trials were placed the hip was reduced and trialed. Optimal length and stability was obtained  with a +0 mm head. Hip stability was quite good in external rotation and full extension, position of sleep and internal rotation 90 .  Hip was dislocated and the final components were impacted into place.  After this the surgical bed was again irrigated and the hip capsule was repaired with #5.  Ethibond.  The greater trochanter and the fragment was then approximated with 2 cerclage cables.  The IT band was closed with a #1 statafix suture. The gluteal fascia was closed with PDS in running fashion. Jay's fascia deeply was closed with 0 PDS and the deep dermis was closed with 2-0 PDS and skin was closed with staples.      All needle, instrument and sponge counts were correct at the conclusion of the procedure in accordance with hospital protocol. A sterile dressing was placed and the patient was subsequently transferred safely to hospital bed.     Postoperative Plan  The patient will return to the hospital p[facility]rez for pain control and monitoring.  They may weight-bear as tolerated trochanteric precautions. Will utilize Lovenox for DVT prophylaxis.

## 2024-04-25 NOTE — PROGRESS NOTES
Moves well in bed with assist. TAPS system in place and patient turned/microturned q1-2h. Po ok. Forgetful . No attempt to get OOB. Skin tear with steristrips to RFA. O2 at 1.5. CHG wipes done and linen changed. External cath in place and intact. Eager for surgery in am so he can start getting OOB.

## 2024-04-26 NOTE — PROGRESS NOTES
"ORTHOPAEDIC SURGERY STAFF PROGRESS NOTE    Resting comfortably with pain medication. No overnight events. Denies chest pain, shortness of breath, lightheadedness.    /74 (BP Location: Right arm, Patient Position: Right side, Cuff Size: Adult Regular)   Pulse 64   Temp 97.8  F (36.6  C) (Oral)   Resp 18   Ht 1.753 m (5' 9\")   Wt 75.5 kg (166 lb 7.2 oz)   SpO2 98%   BMI 24.58 kg/m      LLE-  Dressing clean/dry/intact  Compartments soft  +extensor hallucis longus, flexor hallucis longus, tibialis anterior, gastroc-soleus complex, Quadriceps Sensation intact to light touch superficial peroneal, deep peroneal, sural, saphenous, tibial nerve distributions  Palpable DP pulse     S/P conversion L hip hemiarthroplasty POD 1    Doing well overall   Pain control  DVT ppx -Lovenox. Plan to switch to oral DOAC prior to DC   PT/OT  WBAT  Will need tighter glucose control  Plan for D/C to TCU in 1-2 days    Yahir Pulido MD   [facility] [place] [facility] [place]  [phone]    "

## 2024-04-26 NOTE — PROGRESS NOTES
Care Management Follow Up    Length of Stay (days): 9    Expected Discharge Date: 04/27/2024     Concerns to be Addressed:       Patient plan of care discussed at interdisciplinary rounds: Yes    Anticipated Discharge Disposition:       Anticipated Discharge Services:    Anticipated Discharge DME:      Patient/family educated on Medicare website which has current facility and service quality ratings:    Education Provided on the Discharge Plan:    Patient/Family in Agreement with the Plan: unable to assess    Referrals Placed by CM/SW:    Private pay costs discussed: Not applicable    Additional Information:  Call placed to Camille regarding TCU referrals. She isn't very familiar with options. Said he's been to [place], one in [facility] but family is between [facility]. Writer discussed Medicare.gov and she will review this and call writer back with choices.     1210: Message received from Camille that she would like referrals sent to [facility]. Referrals sent via [facility].    JERE Cárdenas

## 2024-04-26 NOTE — PROGRESS NOTES
[facility]    Medicine Progress Note - Hospitalist Service    Date of Admission:  4/17/2024    Assessment & Plan   Tc Garcia is an 84 year old male with a past medical history of HFpEF, CKD, A-fib on AC, DM type I, diabetic nephropathy, HTN, pulmonary HTN, recent intracranial hemorrhage in 12/2023, recent L hip fracture, s/p IM nailing on 3/23/24, who was admitted on 4/17/2024 from TCU with shortness of breath, leg pain and swelling, and recent unwitnessed fall.      S/p fall, with left hip fracture  S/p L femur fx 3/23/24 repair  S/p L total hip arthroplasty revision 4/25/245, Dr. Pulido  S/p left hip IM nail 3/23/24 at [facility], presented from TCU with reported fall, SOB/, leg pain. XR w new left hip fracture.  *Surgery 4/25 with Dr. Pulido  Orthopedics consulted, ongoing follow-up  DVT prophylaxis, wound care, pain control, activity orders as per orthopedics   On enoxaparin (Lovenox) for DVT prophylaxis, future plans for direct oral anticoagulant (DOAC) per Dr. Pulido, note 4/26  PT, OT consulted  Pain control, see hospital orders  AM labs    Diabetes mellitus with hyperglycemia, A1c 9% on 3/20/2024, uncontrolled  Hypoglycemia, intermittent, with periods of hyperglycemia  * Unclear if type I or type II, both listed in notes. Diagnosed in his 60s but on minimal insulin and not PO medications, consistent with type I.   >250 on admit  * On lantus 8 units with humolog 4 units TID prior to admission   *Stress-induced hyperglycemia in the setting of diabetes mellitus and recent fracture  Brittle diabetes mellitus, hyperglycemia and hypoglycemia recently  Continue long and short-acting insulin, adjusted 4/26; on/off hypoglycemia with protocol followed  High-intensity sliding scale started 4/26, previously medium   Monitor blood sugars, fluctuating recently despite insulin dose adjustments; continue to monitor closely and adjust; stress-state with recent surgery 4/25  3 AM blood sugar checks  daily  Diabetic diet  Recent Labs   Lab 04/26/24  1010 04/26/24  0819 04/26/24  0730 04/26/24  0644 04/26/24  0215 04/25/24  2119   * 432* 443* 380* 338* 290*     Chronic normocytic anemia  Possible vomiting of blood 4/17  Iron deficiency (See iron indices below)   With recent hip fracture, prior baseline hemoglobin 4/15 7.8 with an MCV 90.6.   On arrival was 8.2, with shortness of breath, possibly due to volume overload vs symptomatic anemia.  * S/p 1 unit preop to get Hgb > 8 on 4/18 per ortho's recommendation. Wanted hgb > 8 prior to surgery.   Monitor for signs of GI bleeding; no recurrent symptoms reported  Iron deficient (see labs in Epic)  Venofer 200 mg daily x 5 days ordered on 04/19/24.   Recent Labs   Lab 04/26/24  0730 04/24/24  0719 04/23/24  0754 04/22/24  0804   HGB 7.9* 10.0* 9.4* 10.1*     Acute respiratory failure with hypoxia   Heart failure with preserved ejection fraction (HFpEF), suspected volume overload  Elevated troponin, likely demand ischemia  Mild pulmonary hypertension  Shortness of breath at presentation. CXR showed chronic pulmonary vascular congestion and changes in left chest.  BNP 5000, last BNP 8120 on 4/15 (being seen for chest pain in the ED, they felt he likely was volume overloaded and increased his diuretic to 20 daily.).    * S/p Lasix 20 IV in ED.    * Low suspicion of acute coronary syndrome; monitored  On ECHO, EF > 70%, flattened septum consistent with volume overload.  Mildly decreased RV systolic function.  Severe biatrial enlargement.  Moderate to severe TR with dilation of the IVC and abnormal respiratory variation.  And severe pulmonary hypertension.  R heart cath on 4/18/24 indicated just mild pulmonary HTN, with elevated left sided filling pressures and wedge of 21, c/w some degree of fluid overload.   Started torsemide 5 mg daily on 04/19/24. (Previously discussed with cardiology, recommended gentle diuresis for high wedge.)  Monitor labs  On 04/21/24,  serum Cr improved, with diuresis.  Oxygen therapy, monitor saturations, titrate as needed; encourage spirometry  Continue torsemide as above  Encourage incentive spirometry  Continue PTA carvedilol 6.25 mg twice daily   Continue PTA amlodipine 5 mg QD   Continue PTA isosorbide 60 mg every day  IV fluids discontinued 4/26     Questionable community-acquired pneumonia, low suspicion  * Noted to have elevated Pro-Hai and a white count of 20k, though felt could be demargination secondary to pain/fracture.  Afebrile.  CXR favored old opacity without clear infiltrate.  Elevated Pro-Hai cannot rule out mild community-acquired pneumonia.    Completed 5 day course of ceftriaxone on 04/21/24; monitor  Oxygen therapy as above  Recent Labs   Lab 04/26/24  0730 04/24/24  0719 04/23/24  0754   WBC 12.4* 9.2 8.0      Atrial fibrillation, previously on anticoagulation -  Stopped following traumatic brain bleed on in Dec 2023.   History of Traumatic ICH in 12/2023  History of completed stroke  * Admission [facility] without current bleed: CT head with small chronic lacunar infarct right caudate.  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   By report, cardiology considering [facility]. See clinic neurology note 2/2/24 and clinic cardiology note 1/29/24 for details.   Anticoagulation cleared by NSG > However, extremely high fall risk with multiple hospitalizations secondary to falls, so holding until [facility], follow-up as outpatient.  PTA rate control with carvedilol (Coreg) as above     Chronic kidney disease 3b, hx nephrotic proteinuria  Monitor serum Cr, urine output, I/O's, daily weights  Started torsemide 5 mg daily on 4/19  Recent Labs   Lab 04/26/24  0730 04/25/24  1331 04/23/24  0754 04/22/24  0804 04/21/24  0758 04/20/24  0751   CR 1.62* 1.28* 1.37* 1.28* 1.37* 1.50*      Left leg swelling  Ultrasound 4/18: No evidence of deep venous thrombosis.  Complex fluid   collection in the left groin.  This most likely represents a hematoma.   Orthopedics consulted as above, monitor    Benign prostatic hypertrophy   Continue tamsulosin (Flomax)      Acute metabolic encephalopathy, resolved  Previous cognitive decline  Mood disorder, with insomina  * Daughter notes that patient is at cognitive baseline, history of at times paranoia delusions.  Last slums 17/30.  No formal dementia diagnosis but in DDx. Past stroke as above.  History of alcohol abuse possible contributes.   Continue PTA mirtazapine 15 mg at bedtime   PRN Roserem on HOLD   Maintain normal day/night, sleep wake cycles  Minimize sedating/altering medications as able  Quetiapine PRN for insomnia and agitation at night only, monitor     Recent C7 and T12 compression fracture with cervical canal stenosis on MRI during Februray  admission for left hip fracture.   Discharged on C-collar to be worn at all times until follow up x-rays and neurosurgical follow up, which was never done. No longer wearing c-collar.   No current report of neck pain, monitor  Neurosurgery consulted.  Appreciate neurosurgery evaluation by Shiela Warren PA-C on 4/18/24   Given he is having no neck pain, no further imaging or intervention.   Follow-up with primary clinic provider after discharge, monitor     Goals of care  Remains DNR/DNI.  Past provider discussed goals of care with patient, with daughter and wife present.  Patient wanted to continue restorative cares.  Family agrees with his decision.     Disposition  Estimated length of stay 2 to 3 days  Anticipated discharge to transitional care unit   Social work consult  Daughter, Camille Ji, called and updated 4/25; additional surgery updates per orthopedics        Diet: Room Service  Moderate Consistent Carb (60 g CHO per Meal) Diet    DVT Prophylaxis: Enoxaparin (Lovenox) SQ  Pruett Catheter: Not present  Lines: None     Cardiac Monitoring: None  Code Status: No CPR- Do NOT Intubate      Clinically Significant Risk Factors                   # Hypertension: Noted on problem list  # Chronic heart failure with preserved ejection fraction: heart failure noted on problem list and last echo with EF >50%      # DMII: A1C = 9.0 % (Ref range: <5.7 %) within past 6 months       # Financial/Environmental Concerns: none         Disposition Plan     Medically Ready for Discharge: Anticipated in 2-4 Days             Edmond Moya MD  Hospitalist Service  [facility]  Securely message with [employer] (more info)  Text page via [employer] Paging/Directory   ______________________________________________________________________    Interval History   Lying in bed, surgery left hip yesterday.  Blood sugars continue to fluctuate with past periods of hyperglycemia and hypoglycemia.  Brittle diabetes.  Insulin adjusted upward today, long and short-acting.  3 AM blood sugars checks continue.  Care plan discussed with nursing staff.  On/off pain left  hip from recent surgery.  Orthopedics visit earlier today, Dr. Pulido.    Physical Exam   Vital Signs: Temp: 98.4  F (36.9  C) Temp src: Oral BP: (!) 141/74 Pulse: 80   Resp: 18 SpO2: 100 % O2 Device: Nasal cannula Oxygen Delivery: 1 LPM  Weight: 166 lbs 7.16 oz    GENERAL awake and alert, lying in bed in no acute distress, pleasant and interactive   LUNGS no wheezes or crackles  HEART S1, S2 with RRR  ABDOMEN soft, nontender  MUSCULOSKELETAL extremities without edema  SKIN warm and dry; left hip incision remains bandaged, as per orthopedics**  NEURO moves upper and lower extremities spontaneously and to command  MENTAL STATUS answering questions and following simple commands    Medical Decision Making             Data     I have personally reviewed the following data over the past 24 hrs:    12.4 (H)  \   7.9 (L)   / 240     135 96 (L) 32.3 (H) /  493 (H)   5.0 27 1.62 (H) \       Imaging results reviewed over the past 24 hrs:   No results found for this or any previous visit (from the past  24 hour(s)).

## 2024-04-26 NOTE — PLAN OF CARE
Date/Time: Ortho    Trauma/Ortho/Medical (Choose one): Trauma    Diagnosis: (L) ANAI Revision  POD#: 1  Mental Status: Forgetful, confused to time and people  Activity/dangle: Up with 2 assist  Diet: Mod CHO  Pain: Managed with tylenol and oxycodone  Pruett/Voiding: Incontinent of bladder  Tele/Restraints/Iso: None  02/LDA: RA. ONEL SL. Did pull out PIV once earlier today  D/C Date: Unknown but will most likely need TCU. Referrals are out  Other Info: Scattered bruising. Had (R) heart cath on 04/18

## 2024-04-26 NOTE — PLAN OF CARE
Goal Outcome Evaluation:      TIME:    ORTHO    Diagnosis: Left total hip arthroplasty revision       POD#:1  Mental Status: A&Ox3, disoriented to time & forgetful  Activity/dangle: Assist of 2  Diet: Regular  Pain: Tylenol   Pruett/Voiding: incontinent  Tele/Restraints/Iso: NA  02/LDA: 2 liters via NC  D/C Date: TBD    Other Info: PIV -SL, wt shift encouraged, scattered bruises seen, voided on incontinent pad, pad changed, pink wedge in place. Blood sugar checked at 2am 338, MD paged, and 3 units ordered and given. Rechecked at 6am, 402, verify with another machine, result shows 380. MD informed, he said we should wait for the on coming shift to re check and give insulin as scheduled.

## 2024-04-26 NOTE — CONSULTS
SPIRITUAL HEALTH SERVICES Consult Note  [facility]  Ortho    Referral Source/Reason for Visit: [facility] consult request (routine). Pt visited by  multiple times during previous hospital stays.    Summary and Recommendations -  Pt is highly motivated for walking after nearly two weeks of bedrest.  Pt benefits from conversation and prayer.  Pt wishes to be anointed by a  if available. He is not concerned if discharged prior to anointing.    Plan: Placed non-emergent request for the Sacrament of the Sick. No follow-up planned as pt expects to discharge in the next 24-48 hours.  remains available by request.    Nicolás Cruz  Associate   [facility] Phone Line [phone]  Spiritual Health Pager [phone]    [facility] available 24/7 for emergent requests/referrals, either by paging the on-call  or by entering an ASAP/STAT consult in [facility], which will also page the on-call .    Assessment    Saw pt Tc Garcia per [facility] consult request..    Patient/Family Understanding of Illness and Goals of Care - Tc states emphatically that he is ready to get up and start moving around and feels very motivated to begin walking.    Distress and Loss - No areas of spiritual or existential loss or distress reported by pt or observed.     Strengths, Coping, and Resources - Tc is motivated to continue and participate in his recovery.    Meaning, Beliefs, and Spirituality - Tc accepted offers of anointing and prayer for his recovery. Not otherwise discussed this visit.

## 2024-04-26 NOTE — PLAN OF CARE
Diagnosis: Left Total Hip Revision  POD#:  DOS  Mental Status: A&Ox3 not to time, forgetful at times   Activity/dangle: A2 w/ PT  Diet: diabetic diet  Pain:  Tylenol   Pruett/Voiding: external cath in place  Tele/Restraints/Iso: NA  02/LDA: 2L NC, PIV infusing (PIV x2)  D/C Date: TBD  Other Info:

## 2024-04-26 NOTE — PROVIDER NOTIFICATION
.MD Notification    Notified Person: MD    Notified Person Name: DOC Herron    Notification Date/Time:  2:48am & 6:53am ( 4/26/2024)    Notification Interaction: [employer] & vocera    Purpose of Notification: increase Blood sugar    Orders Received: Yes    Comments: Blood Sugar at 2am shows 338 , MD informed and ordered 3 units insuline, given as ordered. Blood sugar rechecked at 6am result shows 402.  Was re-checked on another machine to verify, result shows 380. MD informed and said we should pass the information to morning shift to recheck before meal, since schedule insuline timing is closed.

## 2024-04-27 NOTE — PLAN OF CARE
Date/Time: 04/27: [id number]    Trauma/Ortho/Medical (Choose one): Trauma    Diagnosis: (L) ANAI Revision  POD#: 2  Mental Status: Confused  Activity/dangle: Up with 2 assist  Diet: Mod CHO  Pain: Managed with oxycodone and tylenol  Pruett/Voiding: Incontinent of bladder. Has external catheter with adequate output  Tele/Restraints/Iso: None  02/LDA: RA. Received a unit of blood for hgb of 7.0  D/C Date: Unknown  Other Info: Pt appears more confused but easily directable. Pt had (R) Heart cath on 4/18. Pt is on sliding scale coverage

## 2024-04-27 NOTE — PLAN OF CARE
Date/Time: Ortho     Trauma/Ortho/Medical (Choose one): Trauma     Diagnosis: (L) ANIA Revision  POD#: 2  Mental Status: Forgetful, confused to time and people  Activity/dangle: Up with 2 assist  Diet: Mod CHO  Pain: Managed with tylenol and oxycodone  Pruett/Voiding: Incontinent of bladder  Tele/Restraints/Iso: None  02/LDA: COLLEEN CAUSEY.   D/C Date: Unknown   Other Info: Had (R) heart cath on 04/18

## 2024-04-27 NOTE — PROGRESS NOTES
"ORTHOPAEDIC SURGERY STAFF PROGRESS NOTE    Resting comfortably with pain medication. No overnight events. Denies chest pain, shortness of breath, lightheadedness.    /62   Pulse 67   Temp 98.7  F (37.1  C) (Oral)   Resp 16   Ht 1.753 m (5' 9\")   Wt 75.5 kg (166 lb 7.2 oz)   SpO2 100%   BMI 24.58 kg/m      LLE-  Dressing clean/dry/intact  Compartments soft  No N/V changes    S/P L hip hemiarthroplasty conversion POD 2    Doing well overall   Pain control  DVT ppx -Hold lovenox today. Plan for eliquis to restart tomorrow  PT/OT  WBAT  1 unit(s) PRBC today     Yahir Pulido MD   [facility] [place]  [phone]    "

## 2024-04-27 NOTE — PROGRESS NOTES
[facility]    Medicine Progress Note - Hospitalist Service    Date of Admission:  4/17/2024    Assessment & Plan   Tc Garcia is an 84 year old male with a past medical history of HFpEF, CKD, A-fib on AC, DM type I, diabetic nephropathy, HTN, pulmonary HTN, recent intracranial hemorrhage in 12/2023, recent L hip fracture, s/p IM nailing on 3/23/24, who was admitted on 4/17/2024 from TCU with shortness of breath, leg pain and swelling, and recent unwitnessed fall.      S/p fall, with left hip fracture  S/p L femur fx 3/23/24 repair  S/p L total hip arthroplasty revision 4/25/245, Dr. Pulido  S/p left hip IM nail 3/23/24 at [place], presented from TCU with reported fall, SOB/, leg pain. XR w new left hip fracture.  *Surgery 4/25 with Dr. Pulido  Orthopedics consulted, ongoing follow-up  DVT prophylaxis, wound care, pain control, activity orders as per orthopedics   On enoxaparin (Lovenox) for DVT prophylaxis, held 4/27 due to anemia; future plans for direct oral anticoagulant (DOAC) per Dr. Pulido, note 4/27  PT, OT consulted  Pain control, see hospital orders  AM labs    Diabetes mellitus with hyperglycemia, A1c 9% on 3/20/2024, uncontrolled  Hypoglycemia, intermittent, with periods of hyperglycemia  * Unclear if type I or type II, both listed in notes. Diagnosed in his 60s but on minimal insulin and not PO medications, consistent with type I.   >250 on admit  * On lantus 8 units with humolog 4 units TID prior to admission   *Stress-induced hyperglycemia in the setting of diabetes mellitus and recent fracture  Brittle diabetes mellitus, hyperglycemia and hypoglycemia recently, blood sugars reviewed  Continue long and short-acting insulin, recently adjusted 4/26; hypoglycemia protocol ordered  High-intensity sliding scale started 4/26 (from medium-intensity)  Monitor blood sugars, continue to fluctuate; continue to monitor closely and adjust insulin prn; stress-state with recent surgery  4/25  3 AM blood sugar checks daily  Diabetic diet  Recent Labs   Lab 04/27/24  0733 04/27/24  0207 04/26/24  2232 04/26/24  2210 04/26/24  2154 04/26/24  2139   * 263* 102* 98 67* 67*     Chronic normocytic anemia  Acute blood loss anemia, postoperative (surgery 4/26)  Possible vomiting of blood 4/17  Iron deficiency (See iron indices below)   With recent hip fracture, prior baseline hemoglobin 4/15 7.8 with an MCV 90.6.   On arrival was 8.2, with shortness of breath, possibly due to volume overload vs symptomatic anemia.  * S/p 1 unit preop to get Hgb > 8 on 4/18 per ortho's recommendation. Wanted hgb > 8 prior to surgery.   *Hb 7 on 4/27, one unit RBC transfusion ordered, consent obtained  Monitor for signs of GI bleeding; no recurrent symptoms reported  Iron deficient (see labs in Epic)  Venofer 200 mg daily x 5 days ordered on 04/19/24.   AM Hb  Recent Labs   Lab 04/27/24  0733 04/26/24  0730 04/24/24  0719 04/23/24  0754 04/22/24  0804   HGB 7.0* 7.9* 10.0* 9.4* 10.1*     Acute respiratory failure with hypoxia   Heart failure with preserved ejection fraction (HFpEF), suspected volume overload  Elevated troponin, likely demand ischemia  Mild pulmonary hypertension  Shortness of breath at presentation. CXR showed chronic pulmonary vascular congestion and changes in left chest.  BNP 5000, last BNP 8120 on 4/15 (being seen for chest pain in the ED, they felt he likely was volume overloaded and increased his diuretic to 20 daily.).    * S/p Lasix 20 IV in ED.    * Low suspicion of acute coronary syndrome; monitored  On ECHO, EF > 70%, flattened septum consistent with volume overload.  Mildly decreased RV systolic function.  Severe biatrial enlargement.  Moderate to severe TR with dilation of the IVC and abnormal respiratory variation.  And severe pulmonary hypertension.  R heart cath on 4/18/24 indicated just mild pulmonary HTN, with elevated left sided filling pressures and wedge of 21, c/w some degree of  fluid overload.   Started torsemide 5 mg daily on 04/19/24. (Previously discussed with cardiology, recommended gentle diuresis for high wedge.)  Monitor labs  On 04/21/24, serum Cr improved, with diuresis.  Oxygen therapy, monitor saturations, titrate as needed; encourage spirometry  Continue torsemide as above  Encourage incentive spirometry  Continue PTA carvedilol 6.25 mg twice daily   Continue PTA amlodipine 5 mg QD   Continue PTA isosorbide 60 mg every day  IV fluids discontinued 4/26  Monitor for signs or symptoms of fluid overload, especially when RBC transfusion ordered for anemia     Questionable community-acquired pneumonia, low suspicion  * Noted to have elevated Pro-Hai and a white count of 20k, though felt could be demargination secondary to pain/fracture.  Afebrile.  CXR favored old opacity without clear infiltrate.  Elevated Pro-Hai cannot rule out mild community-acquired pneumonia.    Completed 5 day course of ceftriaxone on 04/21/24; monitor  Oxygen therapy as above  Recent Labs   Lab 04/26/24  0730 04/24/24  0719 04/23/24  0754   WBC 12.4* 9.2 8.0      Atrial fibrillation, previously on anticoagulation -  Stopped following traumatic brain bleed on in Dec 2023.   History of Traumatic ICH in 12/2023  History of completed stroke  * Admission CTH without current bleed: CT head with small chronic lacunar infarct right caudate.  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   By report, cardiology considering Watchman. See clinic neurology note 2/2/24 and clinic cardiology note 1/29/24 for details.   Anticoagulation cleared by NSG > However, extremely high fall risk with multiple hospitalizations secondary to falls, so holding until Watchman, follow-up as outpatient.  PTA rate control with carvedilol (Coreg) as above  Anticoagulation for DVT prophylaxis after surgery as per ortho     Chronic kidney disease 3b, hx nephrotic proteinuria  Acute kidney injury   Monitor  serum Cr, urine output, I/O's, daily weights  Started torsemide 5 mg daily on 4/19  Encourage oral fluid intake  AM basic profile; consider holding diuretic if worsening serum Cr**  Recent Labs   Lab 04/27/24  0733 04/26/24  0730 04/25/24  1331 04/23/24  0754 04/22/24  0804 04/21/24  0758   CR 1.67* 1.62* 1.28* 1.37* 1.28* 1.37*      Left leg swelling  Ultrasound 4/18: No evidence of deep venous thrombosis.  Complex fluid   collection in the left groin. This most likely represents a hematoma.   Orthopedics consulted as above, monitor    Benign prostatic hypertrophy   Continue tamsulosin (Flomax)      Acute metabolic encephalopathy, resolved  Previous cognitive decline  Mood disorder, with insomina  * Daughter notes that patient is at cognitive baseline, history of at times paranoia delusions.  Last slums 17/30.  No formal dementia diagnosis but in DDx. Past stroke as above.  History of alcohol abuse possible contributes.   Continue PTA mirtazapine 15 mg at bedtime   PRN Roserem on HOLD   Maintain normal day/night, sleep wake cycles  Minimize sedating/altering medications as able  Quetiapine PRN for insomnia and agitation at night only, monitor     Recent C7 and T12 compression fracture with cervical canal stenosis on MRI during Februray  admission for left hip fracture.   Discharged on C-collar to be worn at all times until follow up x-rays and neurosurgical follow up, which was never done. No longer wearing c-collar.   No current report of neck pain, monitor  Neurosurgery consulted.  Appreciate neurosurgery evaluation by Shiela Warren PA-C on 4/18/24   Given he is having no neck pain, no further imaging or intervention.   Follow-up with primary clinic provider after discharge, monitor     Goals of care  Remains DNR/DNI.  Past provider discussed goals of care with patient, with daughter and wife present.  Patient wanted to continue restorative cares.  Family agrees with his decision.     Disposition  Estimated  length of stay 2 days  Anticipated discharge to transitional care unit   Social work consult  Daughter, Camille Ji, called and updated 4/27; no answer, message left on identified voicemail        Diet: Room Service  Moderate Consistent Carb (60 g CHO per Meal) Diet    DVT Prophylaxis: Enoxaparin (Lovenox) SQ  Pruett Catheter: Not present  Lines: None     Cardiac Monitoring: None  Code Status: No CPR- Do NOT Intubate      Clinically Significant Risk Factors                  # Hypertension: Noted on problem list  # Chronic heart failure with preserved ejection fraction: heart failure noted on problem list and last echo with EF >50%      # DMII: A1C = 9.0 % (Ref range: <5.7 %) within past 6 months       # Financial/Environmental Concerns: none         Disposition Plan     Medically Ready for Discharge: Anticipated in 2-4 Days             Edmond Moya MD  Hospitalist Service  [facility]  Securely message with [employer] (more info)  Text page via [employer] Paging/Directory   ______________________________________________________________________    Interval History   Lying in bed, nursing staff at the bedside.  Low hemoglobin today, one unit red blood cell transfusion planned.  Mild dyspnea.  No chest pain.  Orthopedics also following, note of Dr. Pulido from today reviewed.    Physical Exam   Vital Signs: Temp: 98.4  F (36.9  C) Temp src: Oral BP: 108/56 Pulse: 63   Resp: 20 SpO2: 93 % O2 Device: Nasal cannula Oxygen Delivery: 1 LPM  Weight: 166 lbs 7.16 oz    GENERAL awake and alert, in good spirits, lying in bed in no acute distress    LUNGS no wheezes or crackles  HEART S1, S2 with RRR  ABDOMEN soft, nontender  MUSCULOSKELETAL extremities without edema  SKIN warm and dry; left hip incision remains bandaged, further exam per orthopedics  NEURO moves upper and lower extremities spontaneously and to command  MENTAL STATUS answering questions and following simple commands    Medical Decision  Making             Data     I have personally reviewed the following data over the past 24 hrs:    N/A  \   7.0 (L)   / N/A     136 98 32.2 (H) /  391 (H)   5.0 28 1.67 (H) \       Imaging results reviewed over the past 24 hrs:   No results found for this or any previous visit (from the past 24 hour(s)).

## 2024-04-27 NOTE — PLAN OF CARE
Occupational Therapy: Orders received. Chart reviewed and discussed with care team.? Occupational Therapy not indicated. Spoke with PT, pt with increased mobility needs at this time, defer functional mobility to PT during pt's current IP admit. PT recommending skilled TCU, defer OT needs to next level of care pending pt POC goals.? Defer discharge recommendations to PT and Care Team.? Will complete orders.

## 2024-04-28 NOTE — PLAN OF CARE
Date/Time: Ortho     Trauma/Ortho/Medical (Choose one): Trauma     Diagnosis: (L) ANAI Revision  POD#: 3  Mental Status: Forgetful, confused to time and people  Activity/dangle: Up with 2 assist  Diet: Mod CHO  Pain: Managed with tylenol and oxycodone  Pruett/Voiding: Incontinent of bladder  Tele/Restraints/Iso: None  02/LDA: COLLEEN CAUSEY.   D/C Date: Unknown   Other Info: Had (R) heart cath on 04/18

## 2024-04-28 NOTE — PROGRESS NOTES
[facility]    Medicine Progress Note - Hospitalist Service    Date of Admission:  4/17/2024    Assessment & Plan   Tc Garcia is an 84 year old male with a past medical history of HFpEF, CKD, A-fib on AC, DM type I, diabetic nephropathy, HTN, pulmonary HTN, recent intracranial hemorrhage in 12/2023, recent L hip fracture, s/p IM nailing on 3/23/24, who was admitted on 4/17/2024 from TCU with shortness of breath, leg pain and swelling, and recent unwitnessed fall.      S/p fall, with left hip fracture  S/p L femur fx 3/23/24 repair  S/p L total hip arthroplasty revision 4/25/245, Dr. Pulido  S/p left hip IM nail 3/23/24 at [place], presented from TCU with reported fall, SOB/, leg pain. XR w new left hip fracture.  *Surgery 4/25 with Dr. Pulido  Orthopedics consulted, ongoing follow-up  DVT prophylaxis, wound care, pain control, activity orders as per orthopedics   Initially on enoxaparin (Lovenox) for DVT prophylaxis, transitioning to direct oral anticoagulant (DOAC), apixaban on 4/28  Monitor hemoglobin   PT, OT consulted  Pain control, see hospital orders  AM labs    Diabetes mellitus with hyperglycemia, A1c 9% on 3/20/2024, uncontrolled  Hypoglycemia, intermittent, with periods of hyperglycemia  * Unclear if type I or type II, both listed in notes. Diagnosed in his 60s but on minimal insulin and not PO medications, consistent with type I.   >250 on admit  * On lantus 8 units with humolog 4 units TID prior to admission   *Stress-induced hyperglycemia in the setting of diabetes mellitus and recent fracture  Brittle diabetes mellitus, hyperglycemia and hypoglycemia recently, blood sugars reviewed  Continue long and short-acting insulin, recently adjusted; hypoglycemia protocol ordered  High-intensity sliding scale started 4/26 (from medium-intensity)  Monitor blood sugars, continue to fluctuate; continue to monitor closely and adjust insulin prn; stress-state hyperglycemia with recent  surgery 4/25  3 AM blood sugar checks daily, rule out hypoglycemia  Diabetic diet  Recent Labs   Lab 04/28/24  0720 04/28/24  0152 04/27/24  2200 04/27/24  1653 04/27/24  1158 04/27/24  0733   * 281* 177* 100* 359* 391*     Chronic normocytic anemia  Acute blood loss anemia, postoperative (surgery 4/26)  Possible vomiting of blood 4/17  Iron deficiency (See iron indices below)   With recent hip fracture, prior baseline hemoglobin 4/15 7.8 with an MCV 90.6.   On arrival was 8.2, with shortness of breath, possibly due to volume overload vs symptomatic anemia.  * S/p 1 unit preop to get Hgb > 8 on 4/18 per ortho's recommendation. Wanted hgb > 8 prior to surgery.   *Hb 7 on 4/27, one unit RBC transfusion ordered, consent obtained  Monitor for signs of GI bleeding; no recurrent symptoms reported  Iron deficient (see labs in Epic)  Venofer 200 mg daily x 5 days ordered on 04/19/24.   AM hemoglobin 4/28, improved from 4/27 after RBC transufusion  Recent Labs   Lab 04/28/24  0720 04/27/24  0733 04/26/24  0730 04/24/24  0719 04/23/24  0754 04/22/24  0804   HGB 9.0* 7.0* 7.9* 10.0* 9.4* 10.1*     Acute respiratory failure with hypoxia   Heart failure with preserved ejection fraction (HFpEF), suspected volume overload  Elevated troponin, likely demand ischemia  Mild pulmonary hypertension  Shortness of breath at presentation. CXR showed chronic pulmonary vascular congestion and changes in left chest.  BNP 5000, last BNP 8120 on 4/15 (being seen for chest pain in the ED, they felt he likely was volume overloaded and increased his diuretic to 20 daily.).    * S/p Lasix 20 IV in ED.    * Low suspicion of acute coronary syndrome; monitored  On ECHO, EF > 70%, flattened septum consistent with volume overload.  Mildly decreased RV systolic function.  Severe biatrial enlargement.  Moderate to severe TR with dilation of the IVC and abnormal respiratory variation.  And severe pulmonary hypertension.  R heart cath on 4/18/24  indicated just mild pulmonary HTN, with elevated left sided filling pressures and wedge of 21, c/w some degree of fluid overload.   Started torsemide 5 mg daily on 04/19/24. (Previously discussed with cardiology, recommended gentle diuresis for high wedge.)  Monitor labs  On 04/21/24, serum Cr improved, with diuresis.  Oxygen therapy, monitor saturations, titrate as needed; encourage spirometry  Continue torsemide as above  Encourage incentive spirometry  Continue PTA carvedilol 6.25 mg twice daily   Continue PTA amlodipine 5 mg QD   Continue PTA isosorbide 60 mg every day  IV fluids discontinued 4/26  Monitor for signs or symptoms of fluid overload, especially when RBC transfusion ordered for anemia 4/27     Questionable community-acquired pneumonia, low suspicion  * Noted to have elevated Pro-Hai and a white count of 20k, though felt could be demargination secondary to pain/fracture.  Afebrile.  CXR favored old opacity without clear infiltrate.  Elevated Pro-Hai cannot rule out mild community-acquired pneumonia.    Completed 5 day course of ceftriaxone on 04/21/24; monitor  Oxygen therapy as above, on room air 4/28  Recent Labs   Lab 04/26/24  0730 04/24/24  0719 04/23/24  0754   WBC 12.4* 9.2 8.0      Atrial fibrillation, previously on anticoagulation -  Stopped following traumatic brain bleed on in Dec 2023.   History of Traumatic ICH in 12/2023  History of completed stroke  * Admission CTH without current bleed: CT head with small chronic lacunar infarct right caudate.  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   By report, cardiology considering Watchman. See clinic neurology note 2/2/24 and clinic cardiology note 1/29/24 for details.   Anticoagulation cleared by NSG > However, extremely high fall risk with multiple hospitalizations secondary to falls, so holding until Watchman, follow-up as outpatient.  PTA rate control with carvedilol (Coreg) as above  Anticoagulation  for DVT prophylaxis after surgery as per orthopedics, apixaban as of 4/28     Chronic kidney disease 3b, hx nephrotic proteinuria  Acute kidney injury   Monitor serum Cr, urine output, I/O's, daily weights  Started torsemide 5 mg daily on 4/19  Encourage oral fluid intake  AM basic profile; consider holding diuretic if worsening serum Cr**  Recent Labs   Lab 04/28/24  0720 04/27/24  0733 04/26/24  0730 04/25/24  1331 04/23/24  0754 04/22/24  0804   CR 1.58* 1.67* 1.62* 1.28* 1.37* 1.28*      Left leg swelling  Ultrasound 4/18: No evidence of deep venous thrombosis.  Complex fluid   collection in the left groin. This most likely represents a hematoma.   Orthopedics consulted as above, monitor    Benign prostatic hypertrophy   Continue tamsulosin (Flomax)      Acute metabolic encephalopathy, resolved  Previous cognitive decline  Mood disorder, with insomina  * Daughter notes that patient is at cognitive baseline, history of at times paranoia delusions.  Last slums 17/30.  No formal dementia diagnosis but in DDx. Past stroke as above.  History of alcohol abuse possible contributes.   Continue PTA mirtazapine 15 mg at bedtime   PRN Roserem on HOLD   Maintain normal day/night, sleep wake cycles  Minimize sedating/altering medications as able  Quetiapine PRN for insomnia and agitation at night only, monitor     Recent C7 and T12 compression fracture with cervical canal stenosis on MRI during [place]  admission for left hip fracture.   Discharged on C-collar to be worn at all times until follow up x-rays and neurosurgical follow up, which was never done. No longer wearing c-collar.   No current report of neck pain, monitor  Neurosurgery consulted.  Appreciate neurosurgery evaluation by Shiela Warren PA-C on 4/18/24   Given he is having no neck pain, no further imaging or intervention.   Follow-up with primary clinic provider after discharge, monitor     Goals of care  Remains DNR/DNI  Past provider discussed goals of care  with patient, with daughter and wife present.  Patient wanted to continue restorative cares.  Family agrees with his decision.     Disposition  Estimated length of stay 1 to 2 days  Anticipated discharge to transitional care unit   Social work consulted  Daughter, Camille Ji, called and updated 4/27; no answer, message left on identified voicemail    Change in hospitalist provider tomorrow with one of my [facility] hospitalist colleagues assuming care and comanaging with orthopedic team.        Diet: Room Service  Moderate Consistent Carb (60 g CHO per Meal) Diet    DVT Prophylaxis: Enoxaparin (Lovenox) SQ  Pruett Catheter: Not present  Lines: None     Cardiac Monitoring: None  Code Status: No CPR- Do NOT Intubate      Clinically Significant Risk Factors                  # Hypertension: Noted on problem list  # Chronic heart failure with preserved ejection fraction: heart failure noted on problem list and last echo with EF >50%      # DMII: A1C = 9.0 % (Ref range: <5.7 %) within past 6 months       # Financial/Environmental Concerns: none         Disposition Plan     Medically Ready for Discharge: Anticipated in 2-4 Days             Edmond Moya MD  Hospitalist Service  [facility]  Securely message with [employer]wolf (more info)  Text page via [email or web address] Paging/Directory   ______________________________________________________________________    Interval History   Sitting up in his chair, nursing staff at the bedside.  Recently finished breakfast, good appetite, in good spirits.  Pain controlled, no increase shortness of breath.  Acute blood loss anemia yesterday with 1 unit packed red blood cell transfusion.  No nausea or vomiting.  Breathing stable.  No chest pain.  Care plan discussed with nursing staff and patient at the bedside.  Ongoing orthopedics consult follow-up, note 4/28/2024 reviewed.    Physical Exam   Vital Signs: Temp: 97.2  F (36.2  C) Temp src: Oral BP: (!) 165/94  Pulse: 77   Resp: 20 SpO2: 94 % O2 Device: None (Room air) Oxygen Delivery: 1 LPM  Weight: 166 lbs 7.16 oz    GENERAL awake and alert, sitting in chair in no immediate distress  LUNGS no wheezes or crackles  HEART S1, S2 with RRR  ABDOMEN soft, nontender  MUSCULOSKELETAL extremities without edema  SKIN warm and dry  NEURO moves upper and lower extremities spontaneously and to command  MENTAL STATUS answering questions and following simple commands    Medical Decision Making             Data     I have personally reviewed the following data over the past 24 hrs:    N/A  \   9.0 (L)   / 257     136 96 (L) 33.1 (H) /  424 (H)   4.8 25 1.58 (H) \       Imaging results reviewed over the past 24 hrs:   No results found for this or any previous visit (from the past 24 hour(s)).

## 2024-04-28 NOTE — PROGRESS NOTES
"Tc Garcia  2024  POD # 3  S/p left hip hemiarthroplasty conversion     Doing well overall, sitting up in bed  Denies CP, SOB or lightheadedness   Received 1 unit PRBC yesterday   Hgb 9 today     Blood pressure (!) 165/94, pulse 77, temperature 97.2  F (36.2  C), temperature source Oral, resp. rate 20, height 1.753 m (5' 9\"), weight 75.5 kg (166 lb 7.2 oz), SpO2 94%.  Temperatures:  Current - Temp: 97.2  F (36.2  C); Max - Temp  Av.3  F (36.8  C)  Min: 97.2  F (36.2  C)  Max: 99  F (37.2  C)  Pulse range: Pulse  Av.9  Min: 56  Max: 79  Blood pressure range: Systolic (24hrs), Av , Min:108 , Max:165   ; Diastolic (24hrs), Av, Min:56, Max:94    CMS: dressing CDI, compartments soft, no N/V   Labs:  Hemoglobin   Date Value Ref Range Status   2024 9.0 (L) 13.3 - 17.7 g/dL Final   06/15/2021 11.7 (A) 13.3 - 17.7 g/dL Final   ]  Lab Results   Component Value Date    INR 1.25 (H) 2024     Lab Results   Component Value Date     2024       PLAN:  TCU pending medical clearance  WBAT   Mobilize with PT/OT  Restart eliquis today   Pain control     Tiffanie Oliveros PA-C  [facility]   "

## 2024-04-28 NOTE — PLAN OF CARE
Date/Time: 04/28: [id number]    Trauma/Ortho/Medical (Choose one) : Trauma    Diagnosis: (L) ANAI Revision  POD#: 3  Mental Status: Alert but confused  Activity/dangle: Up with 2/Annette-Steady  Diet: Mod CHO diet  Pain: Managed with oxycodone  Pruett/Voiding: Incontinent of bladder, pt's scrotum/varghese area reddened. Mepilex on coccyx for skin protection  Tele/Restraints/Iso: None  02/LDA: COLLEEN CAUSEY  D/C Date: Unknown, will be going to TCU upon discharge  Other Info: Had (R) Heart cath on 04/18. Is on sliding scale coverage. Pt's BG level varies. Before lunch was 522, Hospitalist update, adjustment made with insulin and rechecked BG was 445, MD updated again

## 2024-04-29 NOTE — PROGRESS NOTES
[facility]    Medicine Progress Note - Hospitalist Service    Date of Admission:  4/17/2024    Assessment & Plan   Tc Garcia is an 84 year old male with a past medical history of HFpEF, CKD, A-fib on AC, DM type I, diabetic nephropathy, HTN, pulmonary HTN, recent intracranial hemorrhage in 12/2023, recent L hip fracture, s/p IM nailing on 3/23/24, who was admitted on 4/17/2024 from TCU with shortness of breath, leg pain and swelling, and recent unwitnessed fall.      S/p fall, with left hip fracture  S/p L femur fx 3/23/24 repair  S/p L total hip arthroplasty revision 4/25/245, Dr. Pulido  S/p left hip IM nail 3/23/24 at [place], presented from TCU with reported fall, SOB/, leg pain. XR w new left hip fracture.  *Surgery 4/25 with Dr. Pulido  Orthopedics consulted, ongoing follow-up  DVT prophylaxis, wound care, pain control, activity orders as per orthopedics   Initially on enoxaparin (Lovenox) for DVT prophylaxis, transitioning to direct oral anticoagulant (DOAC), apixaban on 4/28  Monitor hemoglobin   PT, OT consulted  Per Ortho:   -Continue PTA Eliquis for DVT prophylaxis.    -Hgb stable.  -Mobilize with PT/OT.  -WBAT LLE with walker.  -Posterior and trochanteric precautions for the left hip.    -Continue current pain regimen.  -Dressings: Keep intact. Okay for RN to change if >60% saturated or peeling/falling off.   -Follow-up: 2 weeks post-op with Dr. Yahir Pulido    Diabetes mellitus with hyperglycemia, A1c 9% on 3/20/2024, uncontrolled  Hypoglycemia, intermittent, with periods of hyperglycemia  * Unclear if type I or type II, both listed in notes. Diagnosed in his 60s but on minimal insulin and not PO medications, consistent with type I.   >250 on admit  * On lantus 8 units with humolog 4 units TID prior to admission   *Stress-induced hyperglycemia in the setting of diabetes mellitus and recent fracture  Brittle diabetes mellitus, hyperglycemia and hypoglycemia recently, blood sugars  reviewed  Continue long and short-acting insulin, recently adjusted; hypoglycemia protocol ordered  High-intensity sliding scale started 4/26 (from medium-intensity)  Monitor blood sugars, continue to fluctuate; continue to monitor closely and adjust insulin prn; stress-state hyperglycemia with recent surgery 4/25  3 AM blood sugar checks daily, rule out hypoglycemia  Diabetic diet  Recent Labs   Lab 04/29/24  1156 04/29/24  0833 04/29/24  0757 04/29/24  0740 04/29/24  0242 04/28/24  2140   * 379* 314* 309* 172* 108*   -4/29/2024 persistent hyperglycemia, as above, requiring 12-27 units NovoLog per carb counting and sliding scale, therefore increase Lantus by 3 units.,  Monitor glucoses, adjust insulin accordingly.        Chronic normocytic anemia  Acute blood loss anemia, postoperative (surgery 4/26)  Possible vomiting of blood 4/17  Iron deficiency (See iron indices below)   With recent hip fracture, prior baseline hemoglobin 4/15 7.8 with an MCV 90.6.   On arrival was 8.2, with shortness of breath, possibly due to volume overload vs symptomatic anemia.  * S/p 1 unit preop to get Hgb > 8 on 4/18 per ortho's recommendation. Wanted hgb > 8 prior to surgery.   *Hb 7 on 4/27, one unit RBC transfusion ordered, consent obtained  Monitor for signs of GI bleeding; no recurrent symptoms reported  Iron deficient (see labs in Epic)  Venofer 200 mg daily x 5 days ordered on 04/19/24.   AM hemoglobin 4/28, improved from 4/27 after RBC transufusion  Recent Labs   Lab 04/29/24  0740 04/28/24  0720 04/27/24  0733 04/26/24  0730 04/24/24  0719 04/23/24  0754   HGB 8.8* 9.0* 7.0* 7.9* 10.0* 9.4*   -Check hemoglobin in a.m.    Acute respiratory failure with hypoxia   Heart failure with preserved ejection fraction (HFpEF), suspected volume overload  Elevated troponin, likely demand ischemia  Mild pulmonary hypertension  Shortness of breath at presentation. CXR showed chronic pulmonary vascular congestion and changes in left  chest.  BNP 5000, last BNP 8120 on 4/15 (being seen for chest pain in the ED, they felt he likely was volume overloaded and increased his diuretic to 20 daily.).    * S/p Lasix 20 IV in ED.    * Low suspicion of acute coronary syndrome; monitored  On ECHO, EF > 70%, flattened septum consistent with volume overload.  Mildly decreased RV systolic function.  Severe biatrial enlargement.  Moderate to severe TR with dilation of the IVC and abnormal respiratory variation.  And severe pulmonary hypertension.  R heart cath on 4/18/24 indicated just mild pulmonary HTN, with elevated left sided filling pressures and wedge of 21, c/w some degree of fluid overload.   Started torsemide 5 mg daily on 04/19/24. (Previously discussed with cardiology, recommended gentle diuresis for high wedge.)  Monitor labs  On 04/21/24, serum Cr improved, with diuresis.  Oxygen therapy, monitor saturations, titrate as needed; encourage spirometry  Continue torsemide as above  Encourage incentive spirometry  Continue PTA carvedilol 6.25 mg twice daily   Continue PTA amlodipine 5 mg QD   Continue PTA isosorbide 60 mg every day  IV fluids discontinued 4/26  Monitor for signs or symptoms of fluid overload, especially when RBC transfusion ordered for anemia 4/27   -On room air.      Questionable community-acquired pneumonia, low suspicion  * Noted to have elevated Pro-Hai and a white count of 20k, though felt could be demargination secondary to pain/fracture.  Afebrile.  CXR favored old opacity without clear infiltrate.  Elevated Pro-Hai cannot rule out mild community-acquired pneumonia.    Completed 5 day course of ceftriaxone on 04/21/24; monitor  Oxygen therapy as above, on room air 4/28  Recent Labs   Lab 04/26/24  0730 04/24/24  0719 04/23/24  0754   WBC 12.4* 9.2 8.0    -Afebrile, no cough, sputum, check CBC/WBC in a.m.    Atrial fibrillation, previously on anticoagulation -  Stopped following traumatic brain bleed on in Dec 2023.   History of  Traumatic ICH in 12/2023  History of completed stroke  * Admission [facility] without current bleed: CT head with small chronic lacunar infarct right caudate.  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   By report, cardiology considering [facility]. See clinic neurology note 2/2/24 and clinic cardiology note 1/29/24 for details.   Anticoagulation cleared by NSG > However, extremely high fall risk with multiple hospitalizations secondary to falls, so holding until [facility], follow-up as outpatient.  PTA rate control with carvedilol (Coreg) as above  Anticoagulation for DVT prophylaxis after surgery as per orthopedics, apixaban as of 4/28.  Follow closely.     Chronic kidney disease 3b, hx nephrotic proteinuria  Acute kidney injury   Monitor serum Cr, urine output, I/O's, daily weights  Started torsemide 5 mg daily on 4/19  Encourage oral fluid intake  AM basic profile; consider holding diuretic if worsening serum Cr**  Recent Labs   Lab 04/29/24  0740 04/28/24  0720 04/27/24  0733 04/26/24  0730 04/25/24  1331 04/23/24  0754   CR 1.50* 1.58* 1.67* 1.62* 1.28* 1.37*      Left leg swelling  Ultrasound 4/18: No evidence of deep venous thrombosis.  Complex fluid   collection in the left groin. This most likely represents a hematoma.   Orthopedics consulted as above, monitor    Benign prostatic hypertrophy   Continue tamsulosin (Flomax)      Acute metabolic encephalopathy, resolved  Previous cognitive decline  Mood disorder, with insomina  * Daughter notes that patient is at cognitive baseline, history of at times paranoia delusions.  Last slums 17/30.  No formal dementia diagnosis but in DDx. Past stroke as above.  History of alcohol abuse possible contributes.   Continue PTA mirtazapine 15 mg at bedtime   PRN Roserem on HOLD   Maintain normal day/night, sleep wake cycles  Minimize sedating/altering medications as able  Quetiapine PRN for insomnia and agitation at night only, monitor      Recent C7 and T12 compression fracture with cervical canal stenosis on MRI during Febr[facility]  admission for left hip fracture.   Discharged on C-collar to be worn at all times until follow up x-rays and neurosurgical follow up, which was never done. No longer wearing c-collar.   No current report of neck pain, monitor  Neurosurgery consulted.  Appreciate neurosurgery evaluation by Shiela Warren PA-C on 4/18/24   Given he is having no neck pain, no further imaging or intervention.   Follow-up with primary clinic provider after discharge, monitor     Goals of care  Remains DNR/DNI  Past provider discussed goals of care with patient, with daughter and wife present.  Patient wanted to continue restorative cares.  Family agrees with his decision.     Disposition  Estimated length of stay 1 to 2 days  Anticipated discharge to transitional care unit   Social work consulted       Diet: Room Service  Moderate Consistent Carb (60 g CHO per Meal) Diet    DVT Prophylaxis: Enoxaparin (Lovenox) SQ  Pruett Catheter: Not present  Lines: None     Cardiac Monitoring: None  Code Status: No CPR- Do NOT Intubate      Clinically Significant Risk Factors                  # Hypertension: Noted on problem list  # Chronic heart failure with preserved ejection fraction: heart failure noted on problem list and last echo with EF >50%      # DMII: A1C = 9.0 % (Ref range: <5.7 %) within past 6 months       # Financial/Environmental Concerns: none         Disposition Plan   1-2 days pending clinical improvement, glycemic control, safe discharge, TCU     Pranav Ambrocio MD  Hospitalist Service  [facility]  Securely message with [employer] (more info)  Text page via [employer] Paging/Directory     Total time 50 minutes for today 4/29/2024 :   time consisted of the following, assuming Patient care with review of records including labs, imaging results, medications, interdisciplinary notes; examination of Patient;  and completing  documentation and orders.  Care Management included counseling/discussion with Patient  regarding current condition  and Coordination of Care time with Nursing  and Specialists, Ortho regarding care plan, management and surveillance; as above. .   ______________________________________________________________________    Interval History   Assumed care.  Reports comfortable.  Eating.  Activities per Ortho.  No cough, sputum, O2 desat.    Physical Exam   Vital Signs: Temp: 97.8  F (36.6  C) Temp src: Oral BP: (!) 161/77 Pulse: 75   Resp: 18 SpO2: 94 % O2 Device: None (Room air)    Weight: 166 lbs 7.16 oz    General/Constitutional:   NAD, alert, calm, cooperative    Chest/Respiratory: Respirations nonlabored room air  Cardiovascular:  no murmur appreciated.  LE edema none  Gastrointestinal/Abdomen:  soft, nontender,   Neuro.  Gross motor tested, nonfocal, ambulation not tested  Psych oriented, affect calm            Data     I have personally reviewed the following data over the past 24 hrs:    N/A  \   8.8 (L)   / 285     135 97 (L) 29.7 (H) /  239 (H)   4.5 29 1.50 (H) \       Imaging results reviewed over the past 24 hrs:

## 2024-04-29 NOTE — PLAN OF CARE
Goal Outcome Evaluation:    Diagnosis: Left ANAI revision  POD#: 4  Mental Status: Alert x2 , confused  Activity/dangle: 2 with batsheva steady  Diet: Mod CHO   Pain: Oxycodone  Pruett/Voiding: Incontinent of B/B f  Tele/Restraints/Iso: NA  02/LDA: RA /  PIV SL  D/C Date: pending TCU placement  Other Info: BG 0200 172 mg/dl. Aquacel dressing CDI.

## 2024-04-29 NOTE — PROGRESS NOTES
Orthopedic Surgery  Tc Garcia  04/29/2024     Admit Date:  4/17/2024    POD: 4 Days Post-Op   S/p left femur intramedullary nail conversion to hemiarthroplasty for a periprosthetic fracture    Patient resting comfortably in bed.    Pain controlled to the left hip.  Denies numbness, tingling, spasms, and cramping to the LLE.  Tolerating oral intake.    Afebrile, VSS.  No acute events overnight.    Temp:  [97.7  F (36.5  C)-98.1  F (36.7  C)] 97.8  F (36.6  C)  Pulse:  [65-75] 75  Resp:  [18-20] 18  BP: (118-169)/(60-78) 161/77  SpO2:  [93 %-95 %] 94 %    Alert and oriented x 2. NAD.  Questionable slight internal rotation of the LLE today, likely due to inability to extend knee.  Left hip Aquacel dressings are clean, dry, and intact.   Minimal erythema of the surrounding skin.   Mild left hip swelling.  Thigh is soft and compressible.  Bilateral calves are soft, non-tender.  Left lower extremity is NVI.  Patient able to resist ankle dorsiflexion and plantar flexion bilaterally.  Able to flex and extend toes.  DP pulse palpable.  Sensation intact bilateral lower extremities.    Labs/Imaging:  Recent Labs   Lab Test 04/29/24  0740 04/28/24  0720 04/27/24  0733 04/26/24  0730 04/24/24  0719 04/23/24  0754   WBC  --   --   --  12.4* 9.2 8.0   HGB 8.8* 9.0* 7.0* 7.9* 10.0* 9.4*    257  --  240 246 263     Recent Labs   Lab Test 04/18/24  0733 12/11/23  1419 01/15/23  1321   INR 1.25* 1.35* 1.13     A/P    1. S/p left femur intramedullary nail conversion to hemiarthroplasty for a periprosthetic proximal femur fracture (DOS: 4/25/24)  -Pelvis/left hip radiographs ordered to ensure no hardware complication.  -Continue PTA Eliquis for DVT prophylaxis.    -Hgb stable.  -Mobilize with PT/OT.  -WBAT LLE with walker.  -Posterior and trochanteric precautions for the left hip.    -Continue current pain regimen.  -Dressings: Keep intact. Okay for RN to change if >60% saturated or peeling/falling off.   -Follow-up: 2 weeks  post-op with Dr. Yahir Pulido    2. Disposition  -Anticipate d/c to TCU when medically cleared and progressing in PT. Ortho stable. Ortho team will sign off, but will see the patient on POD#14 if still inpatient.    Dipika Gutierrez PA-C  [facility]

## 2024-04-29 NOTE — PLAN OF CARE
Goal Outcome Evaluation:      Plan of Care Reviewed With: patient    Overall Patient Progress: improvingOverall Patient Progress: improving    Outcome Evaluation: Discharge pending TCU placement.    Patient vital signs are at baseline: Yes  Patient able to ambulate as they were prior to admission or with assist devices provided by therapies during their stay:  Yes  Patient MUST void prior to discharge:  Yes  Patient able to tolerate oral intake:  Yes  Pain has adequate pain control using Oral analgesics:  Yes  Does patient have an identified :  No,  Reason:  Discharge pending TCU placement.  Has goal D/C date and time been discussed with patient:  No,  Reason:  Discharge pending TCU placement.    Dx:Left ANAI Revision  POD#4    A&Ox2 (except Time, Place).   CMS Intact.   VSS on RA.   Up with Ax2 GB and Yoav.   Voiding Incontinent B/B.   Right PIV SL.  Mod Carb Diet.  Pain controlled with Tylenol.   Continue to monitor.

## 2024-04-30 NOTE — PROGRESS NOTES
[facility]    Medicine Progress Note - Hospitalist Service    Date of Admission:  4/17/2024    Assessment & Plan   Tc Garcia is an 84 year old male with a past medical history of HFpEF, CKD, A-fib on AC, DM type I, diabetic nephropathy, HTN, pulmonary HTN, recent intracranial hemorrhage in 12/2023, recent L hip fracture, s/p IM nailing on 3/23/24, who was admitted on 4/17/2024 from TCU with shortness of breath, leg pain and swelling, and recent unwitnessed fall.      S/p fall, with left hip fracture  S/p L femur fx 3/23/24 repair  S/p L total hip arthroplasty revision 4/25/245, Dr. Pulido  S/p left hip IM nail 3/23/24 at [facility], presented from TCU with reported fall, SOB/, leg pain. XR w new left hip fracture.  *Surgery 4/25 with Dr. Pulido  Orthopedics consulted, ongoing follow-up  DVT prophylaxis, wound care, pain control, activity orders as per orthopedics   Initially on enoxaparin (Lovenox) for DVT prophylaxis, transitioning to direct oral anticoagulant (DOAC), apixaban on 4/28  Monitor hemoglobin   PT, OT consulted  Per Ortho:   -Continue PTA Eliquis for DVT prophylaxis.    -Hgb stable.  -Mobilize with PT/OT.  -WBAT LLE with walker.  -Posterior and trochanteric precautions for the left hip.    -Continue current pain regimen.  -Dressings: Keep intact. Okay for RN to change if >60% saturated or peeling/falling off.   -Follow-up: 2 weeks post-op with Dr. Yahir Pulido  -Working with therapies, nursing reports no wound issues.    Diabetes mellitus with hyperglycemia, A1c 9% on 3/20/2024, uncontrolled  Hypoglycemia, intermittent, with periods of hyperglycemia  * Unclear if type I or type II, both listed in notes. Diagnosed in his 60s but on minimal insulin and not PO medications, consistent with type I.   >250 on admit  * On lantus 8 units with humolog 4 units TID prior to admission   *Stress-induced hyperglycemia in the setting of diabetes mellitus and recent fracture  Brittle diabetes  mellitus, hyperglycemia and hypoglycemia recently, blood sugars reviewed  Continue long and short-acting insulin, recently adjusted; hypoglycemia protocol ordered  High-intensity sliding scale started 4/26 (from medium-intensity)  Monitor blood sugars, continue to fluctuate; continue to monitor closely and adjust insulin prn; stress-state hyperglycemia with recent surgery 4/25  3 AM blood sugar checks daily, rule out hypoglycemia  Diabetic diet  Recent Labs   Lab 04/30/24  1120 04/30/24  0820 04/29/24  2337 04/29/24  2102 04/29/24  1831 04/29/24  1750   * 264* 196* 129* 119* 93   -4/29/2024 persistent hyperglycemia, as above, requiring 12-27 units NovoLog per carb counting and sliding scale, therefore increase Lantus by 3 units.,  Monitor glucoses, adjust insulin accordingly.  -4/30/2024 glucoses improving, continue monitor        Chronic normocytic anemia  Acute blood loss anemia, postoperative (surgery 4/26)  Possible vomiting of blood 4/17  Iron deficiency (See iron indices below)   With recent hip fracture, prior baseline hemoglobin 4/15 7.8 with an MCV 90.6.   On arrival was 8.2, with shortness of breath, possibly due to volume overload vs symptomatic anemia.  * S/p 1 unit preop to get Hgb > 8 on 4/18 per ortho's recommendation. Wanted hgb > 8 prior to surgery.   *Hb 7 on 4/27, one unit RBC transfusion ordered, consent obtained  Monitor for signs of GI bleeding; no recurrent symptoms reported  Iron deficient (see labs in Epic)  Venofer 200 mg daily x 5 days ordered on 04/19/24.   AM hemoglobin 4/28, improved from 4/27 after RBC transufusion  Recent Labs   Lab 04/30/24  0758 04/29/24  0740 04/28/24  0720 04/27/24  0733 04/26/24  0730 04/24/24  0719   HGB 8.7* 8.8* 9.0* 7.0* 7.9* 10.0*   -Hemoglobin stable.  Monitor    Acute respiratory failure with hypoxia   Heart failure with preserved ejection fraction (HFpEF), suspected volume overload  Elevated troponin, likely demand ischemia  Mild pulmonary  hypertension  Shortness of breath at presentation. CXR showed chronic pulmonary vascular congestion and changes in left chest.  BNP 5000, last BNP 8120 on 4/15 (being seen for chest pain in the ED, they felt he likely was volume overloaded and increased his diuretic to 20 daily.).    * S/p Lasix 20 IV in ED.    * Low suspicion of acute coronary syndrome; monitored  On ECHO, EF > 70%, flattened septum consistent with volume overload.  Mildly decreased RV systolic function.  Severe biatrial enlargement.  Moderate to severe TR with dilation of the IVC and abnormal respiratory variation.  And severe pulmonary hypertension.  R heart cath on 4/18/24 indicated just mild pulmonary HTN, with elevated left sided filling pressures and wedge of 21, c/w some degree of fluid overload.   Started torsemide 5 mg daily on 04/19/24. (Previously discussed with cardiology, recommended gentle diuresis for high wedge.)  Monitor labs  On 04/21/24, serum Cr improved, with diuresis.  Oxygen therapy, monitor saturations, titrate as needed; encourage spirometry  Continue torsemide as above  Encourage incentive spirometry  Continue PTA carvedilol 6.25 mg twice daily   Continue PTA amlodipine 5 mg QD   Continue PTA isosorbide 60 mg every day  IV fluids discontinued 4/26  Monitor for signs or symptoms of fluid overload, especially when RBC transfusion ordered for anemia 4/27   -On room air.  -BMP in a.m. on torsemide.      Questionable community-acquired pneumonia, low suspicion  * Noted to have elevated Pro-Hai and a white count of 20k, though felt could be demargination secondary to pain/fracture.  Afebrile.  CXR favored old opacity without clear infiltrate.  Elevated Pro-Hai cannot rule out mild community-acquired pneumonia.    Completed 5 day course of ceftriaxone on 04/21/24; monitor  Oxygen therapy as above, on room air 4/28  Recent Labs   Lab 04/30/24  0758 04/26/24  0730 04/24/24  0719   WBC 11.6* 12.4* 9.2    -Afebrile, no cough, sputum,  WBC 11.6.    Atrial fibrillation, previously on anticoagulation -  Stopped following traumatic brain bleed on in Dec 2023.   History of Traumatic ICH in 12/2023  History of completed stroke  * Admission [facility] without current bleed: CT head with small chronic lacunar infarct right caudate.  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   By report, cardiology considering [facility]. See clinic neurology note 2/2/24 and clinic cardiology note 1/29/24 for details.   Anticoagulation cleared by NSG > However, extremely high fall risk with multiple hospitalizations secondary to falls, so holding until [facility], follow-up as outpatient.  PTA rate control with carvedilol (Coreg) as above  Anticoagulation for DVT prophylaxis after surgery as per orthopedics, apixaban as of 4/28.  Follow closely.     Chronic kidney disease 3b, hx nephrotic proteinuria  Acute kidney injury   Monitor serum Cr, urine output, I/O's, daily weights  Started torsemide 5 mg daily on 4/19  Encourage oral fluid intake  AM basic profile; consider holding diuretic if worsening serum Cr**  Recent Labs   Lab 04/29/24  0740 04/28/24  0720 04/27/24  0733 04/26/24  0730 04/25/24  1331   CR 1.50* 1.58* 1.67* 1.62* 1.28*      Left leg swelling  Ultrasound 4/18: No evidence of deep venous thrombosis.  Complex fluid   collection in the left groin. This most likely represents a hematoma.   Orthopedics consulted as above, monitor    Benign prostatic hypertrophy   Continue tamsulosin (Flomax)      Acute metabolic encephalopathy, resolved  Previous cognitive decline  Mood disorder, with insomina  * Daughter notes that patient is at cognitive baseline, history of at times paranoia delusions.  Last slums 17/30.  No formal dementia diagnosis but in DDx. Past stroke as above.  History of alcohol abuse possible contributes.   Continue PTA mirtazapine 15 mg at bedtime   PRN [facility] on [facility]   Maintain normal day/night, sleep wake  cycles  Minimize sedating/altering medications as able  Quetiapine PRN for insomnia and agitation at night only, monitor     Recent C7 and T12 compression fracture with cervical canal stenosis on MRI during Februray  admission for left hip fracture.   Discharged on C-collar to be worn at all times until follow up x-rays and neurosurgical follow up, which was never done. No longer wearing c-collar.   No current report of neck pain, monitor  Neurosurgery consulted.  Appreciate neurosurgery evaluation by Shiela Warren PA-C on 4/18/24   Given he is having no neck pain, no further imaging or intervention.   Follow-up with primary clinic provider after discharge, monitor     Goals of care  Remains DNR/DNI  Past provider discussed goals of care with patient, with daughter and wife present.  Patient wanted to continue restorative cares.  Family agrees with his decision.     Disposition  Estimated length of stay 1 to 2 days  Anticipated discharge to transitional care unit   Social work consulted       Diet: Room Service  Moderate Consistent Carb (60 g CHO per Meal) Diet    DVT Prophylaxis: Enoxaparin (Lovenox) SQ  Pruett Catheter: Not present  Lines: None     Cardiac Monitoring: None  Code Status: No CPR- Do NOT Intubate      Clinically Significant Risk Factors                  # Hypertension: Noted on problem list  # Chronic heart failure with preserved ejection fraction: heart failure noted on problem list and last echo with EF >50%      # DMII: A1C = 9.0 % (Ref range: <5.7 %) within past 6 months       # Financial/Environmental Concerns: none         Disposition Plan   1-2 days pending clinical improvement, fluid management, safe discharge, TCU     Pranav Ambrocio MD  Hospitalist Service  [facility]  Securely message with [email or web address] (more info)  Text page via [employer] Paging/Directory     I spent 35 minutes total time in management of care today 4/30/2024 reviewing labs, medications,  interdisciplinary notes; and completing documentation of encounter and orders with Care Management including counseling/discussion with the Patient regarding hip frx and Coordinating Care and plan with Nursing and Specialists, Ortho regarding management and surveillance, as above.    ______________________________________________________________________    Interval History   Eating.  Participates in therapies.  Reports he is comfortable.  Denies cough, sputum, O2 desat, afebrile.    Physical Exam   Vital Signs: Temp: 97.7  F (36.5  C) Temp src: Axillary BP: (!) 162/79 Pulse: 76   Resp: 16 SpO2: 91 % O2 Device: None (Room air)    Weight: 166 lbs 7.16 oz    General/Constitutional:   NAD, more alert, calm, cooperative  Chest/Respiratory: Respirations nonlabored room air  Cardiovascular:  no murmur appreciated.  LE edema none  Gastrointestinal/Abdomen:  soft, nontender,   Neuro.  Gross motor tested, nonfocal, ambulation not tested  Psych oriented, affect calm          Data     I have personally reviewed the following data over the past 24 hrs:    11.6 (H)  \   8.7 (L)   / 288     N/A N/A N/A /  207 (H)   N/A N/A N/A \       Imaging results reviewed over the past 24 hrs:

## 2024-04-30 NOTE — PROGRESS NOTES
Orientation/Cognitive: Aox 1-2; waxes and wanes.   Mobility Level/Assist Equipment: Ax2; sarasteady. Does try to get up sometimes.  Pain Management: oxy 2.5 given.   Tele/VS/O2: VSS x HTN  ABNL Lab/BG: elevated BG  Diet: Mod Carb  Bowel/Bladder: Incontinent of B/B, external cath in place.   Skin Concerns: Scattered Bruising. Right kneee scab, rash in perineum and scrotom, L hip surgical site.   Drains/Devices: PIV SL R Hand   Discharge: Pending TCU Placement  BG Checks

## 2024-04-30 NOTE — PLAN OF CARE
Orthopedic Plan of Care/Chart Check    Left hip/pelvis radiographs dated 4/29/24:  IMPRESSION: Left hip arthroplasty with cerclage wire fixation of the proximal femur stable. No acute fracture. Postoperative gas in the soft tissues, mildly decreased from prior.    No changes to previous postop plan. Continue with recommendations from progress note from 4/29/24. Ortho team will sign off.    Dipika Gutierrez PA-C  [facility]

## 2024-04-30 NOTE — PROVIDER NOTIFICATION
MD Notification    Notified Person: MD    Notified Person Name:  Hoangkamaladiana    Notification Date/Time: 1653, 4/30/24    Notification Interaction: page    Purpose of Notification:     Pt's BG is low, first BG check was 28 and had BG rechecked and it was 55, giving IV glucose as pt is more confused now. Will recheck     1720 RN to MD: recheck 60    Orders Received: verbal order to discontinue carb count insulin, recheck and update    1745 RN to MD: recheck was 151    Comments:

## 2024-04-30 NOTE — PLAN OF CARE
Diagnosis:Fall, left hip fracture   POD#:4  Mental Status:Alert to self  Activity/dangle: Ax2 sarasteady, Q2 turn and repo  Diet:Mod carb  Pain:Oxy and ice for pain   Pruett/Voiding:Incontinent   02/LDA:S/L  D/C Date:TBD, TCU placement

## 2024-04-30 NOTE — PROGRESS NOTES
Care Management Follow Up    Length of Stay (days): 13    Expected Discharge Date: 05/01/2024     Concerns to be Addressed:       Patient plan of care discussed at interdisciplinary rounds: Yes    Anticipated Discharge Disposition:       Anticipated Discharge Services:    Anticipated Discharge DME:      Patient/family educated on Medicare website which has current facility and service quality ratings:    Education Provided on the Discharge Plan:    Patient/Family in Agreement with the Plan: unable to assess    Referrals Placed by CM/SW:    Private pay costs discussed: Not applicable    Additional Information:  Both referrals declined due to beds and acuity. Writer sent a message to daughter asking for an additional 3-4 choices for TCU to send.    Camille responded with [facility], [facility] and [facility] in that order. Referrals sent.    1530: Nima at [facility] called and accepted patient pending insurance auth. Call returned and message left requesting they start auth. Message sent to Camille with this information and she is in agreement. SW to follow up when auth is received.    JERE Cárdenas

## 2024-04-30 NOTE — PLAN OF CARE
Orientation/Cognitive: A/Ox2  Mobility Level/Assist Equipment: Ax2 batsheva steady, turn and repo  Fall Risk (Y/N): yes  Behavior Concerns: calm and cooperative  Pain Management: no pain reported this shift  Tele/VS/O2: VSS on RA  ABNL Lab/BG: BG checks ACHS, BG very low this shift, hospitalist was notified see note. BG 28, 55, 60, 151, 104. Pt eating dinner now, alert and comfortable  Diet: Mod carb diet  Bowel/Bladder: incontinent of B&B, external cath in place  Skin Concerns: left hip incision CDI, perineum red and rashy, powder in place  Drains/Devices: Right PIV SL  Tests/Procedures for next shift: ortho following  Anticipated DC date & active delays: tbd

## 2024-05-01 NOTE — PLAN OF CARE
Diagnosis: Left ANAI revision  POD#:  7  Mental Status: A&Ox2  Activity/dangle: A2, gait belt, Annette Stedy  Diet: mod carb  Pain: managed w/ Tylenol and Oxycodone  Pruett/Voiding: incontinent B&B  02/LDA: room air, PIV SL  D/C Date: TCU, pending insurance authorization  Other Info: redness on scrotum and inner thighs, barrier cream applied

## 2024-05-01 NOTE — PROGRESS NOTES
Care Management Follow Up    Length of Stay (days): 14    Expected Discharge Date: 05/02/2024     Concerns to be Addressed:     Discharge Planning  Patient plan of care discussed at interdisciplinary rounds: Yes    Anticipated Discharge Disposition:  TCU     Anticipated Discharge Services:  Skilled Nursing  Anticipated Discharge DME:  None    Patient/family educated on Medicare website which has current facility and service quality ratings:    Education Provided on the Discharge Plan:    Patient/Family in Agreement with the Plan: unable to assess    Referrals Placed by CM/SW:    Private pay costs discussed: Not applicable    Additional Information:  [facility] and to see if authorization had been started. Authorization had not been started yet. Writer updated MD regarding status of the authorization. Will most likely discharge tomorrow. [facility] will keep writer updated for status of the authorization.     Fred OQUENDO  [facility]  Care Management

## 2024-05-01 NOTE — PROGRESS NOTES
Diagnosis: Left ANAI revision  POD#:  7  Mental Status: A&Ox2  Activity/dangle up 2 batsheva steady  Diet: mod carb  Pain: denies  Pruett/Voiding: incontinent B&B  02/LDA: RA. Iv SL  D/C Date: TCU 5/1  Other Info: Dressing CDI. Turn and reposition. Perineum red

## 2024-05-01 NOTE — PROGRESS NOTES
[facility]    Medicine Progress Note - Hospitalist Service    Date of Admission:  4/17/2024    Assessment & Plan   Tc Garcia is an 84 year old male with a past medical history of HFpEF, CKD, A-fib on AC, DM type I, diabetic nephropathy, HTN, pulmonary HTN, recent intracranial hemorrhage in 12/2023, recent L hip fracture, s/p IM nailing on 3/23/24, who was admitted on 4/17/2024 from TCU with shortness of breath, leg pain and swelling, and recent unwitnessed fall.      S/p fall, with left hip fracture  S/p L femur fx 3/23/24 repair  S/p L total hip arthroplasty revision 4/25/245, Dr. Pulido  S/p left hip IM nail 3/23/24 at [facility], presented from TCU with reported fall, SOB/, leg pain. XR w new left hip fracture.  *Surgery 4/25 with Dr. Pulido  Orthopedics consulted, ongoing follow-up  DVT prophylaxis, wound care, pain control, activity orders as per orthopedics   Initially on enoxaparin (Lovenox) for DVT prophylaxis, transitioning to direct oral anticoagulant (DOAC), apixaban on 4/28  Monitor hemoglobin   PT, OT consulted  Per Ortho:   -Continue PTA Eliquis for DVT prophylaxis.    -Hgb stable.  -Mobilize with PT/OT.  -WBAT LLE with walker.  -Posterior and trochanteric precautions for the left hip.    -Continue current pain regimen.  -Dressings: Keep intact. Okay for RN to change if >60% saturated or peeling/falling off.   -Follow-up: 2 weeks post-op with Dr. Yahir Pulido  -Working with therapies, nursing reports no wound issues.  -Therapies recommend TCU.    Diabetes mellitus with hyperglycemia, A1c 9% on 3/20/2024, uncontrolled  Hypoglycemia, intermittent, with periods of hyperglycemia  * Unclear if type I or type II, both listed in notes. Diagnosed in his 60s but on minimal insulin and not PO medications, consistent with type I.   >250 on admit  * On lantus 8 units with humolog 4 units TID prior to admission   *Stress-induced hyperglycemia in the setting of diabetes mellitus and recent  fracture  Brittle diabetes mellitus, hyperglycemia and hypoglycemia recently, blood sugars reviewed  Continue long and short-acting insulin, recently adjusted; hypoglycemia protocol ordered  High-intensity sliding scale started 4/26 (from medium-intensity)  Monitor blood sugars, continue to fluctuate; continue to monitor closely and adjust insulin prn; stress-state hyperglycemia with recent surgery 4/25  3 AM blood sugar checks daily, rule out hypoglycemia  Diabetic diet  Recent Labs   Lab 05/01/24  1136 05/01/24  0824 05/01/24  0742 05/01/24  0241 04/30/24  2144 04/30/24  1804   * 155* 139* 83 103* 104*   -4/29/2024 persistent hyperglycemia, as above, requiring 12-27 units NovoLog per carb counting and sliding scale, therefore increase Lantus by 3 units.,  Monitor glucoses, adjust insulin accordingly.  -4/30/2024 in the late afternoon patient was rather severe hypoglycemia per nursing.  Previous orders included carb counting glucose however PTA patient without insulin.  Patient eating.  Therefore DC'd carb counting insulin.  -Glucose today rather variable 83-2 27, therefore continue on current regimen Lantus 13 and SSI.  As above patient eating.      Chronic normocytic anemia  Acute blood loss anemia, postoperative (surgery 4/26)  Possible vomiting of blood 4/17  Iron deficiency (See iron indices below)   With recent hip fracture, prior baseline hemoglobin 4/15 7.8 with an MCV 90.6.   On arrival was 8.2, with shortness of breath, possibly due to volume overload vs symptomatic anemia.  * S/p 1 unit preop to get Hgb > 8 on 4/18 per ortho's recommendation. Wanted hgb > 8 prior to surgery.   *Hb 7 on 4/27, one unit RBC transfusion ordered, consent obtained  Monitor for signs of GI bleeding; no recurrent symptoms reported  Iron deficient (see labs in Epic)  Venofer 200 mg daily x 5 days ordered on 04/19/24.   AM hemoglobin 4/28, improved from 4/27 after RBC transufusion  Recent Labs   Lab 05/01/24  0742  04/30/24  0758 04/29/24  0740 04/28/24  0720 04/27/24  0733 04/26/24  0730   HGB 9.2* 8.7* 8.8* 9.0* 7.0* 7.9*   -Hemoglobin stable.  Monitor    Acute respiratory failure with hypoxia   Heart failure with preserved ejection fraction (HFpEF), suspected volume overload  Elevated troponin, likely demand ischemia  Mild pulmonary hypertension  Shortness of breath at presentation. CXR showed chronic pulmonary vascular congestion and changes in left chest.  BNP 5000, last BNP 8120 on 4/15 (being seen for chest pain in the ED, they felt he likely was volume overloaded and increased his diuretic to 20 daily.).    * S/p Lasix 20 IV in ED.    * Low suspicion of acute coronary syndrome; monitored  On ECHO, EF > 70%, flattened septum consistent with volume overload.  Mildly decreased RV systolic function.  Severe biatrial enlargement.  Moderate to severe TR with dilation of the IVC and abnormal respiratory variation.  And severe pulmonary hypertension.  R heart cath on 4/18/24 indicated just mild pulmonary HTN, with elevated left sided filling pressures and wedge of 21, c/w some degree of fluid overload.   Started torsemide 5 mg daily on 04/19/24. (Previously discussed with cardiology, recommended gentle diuresis for high wedge.)  Monitor labs  On 04/21/24, serum Cr improved, with diuresis.  Oxygen therapy, monitor saturations, titrate as needed; encourage spirometry  Continue torsemide as above  Encourage incentive spirometry  Continue PTA carvedilol 6.25 mg twice daily   Continue PTA amlodipine 5 mg QD   Continue PTA isosorbide 60 mg every day  IV fluids discontinued 4/26  Monitor for signs or symptoms of fluid overload, especially when RBC transfusion ordered for anemia 4/27   -On room air.  -BMP in a.m. on torsemide.  BMP 5/1/2024 CR downtrending 1.39 sodium, potassium WNL.      Questionable community-acquired pneumonia, low suspicion  * Noted to have elevated Pro-Hai and a white count of 20k, though felt could be  demargination secondary to pain/fracture.  Afebrile.  CXR favored old opacity without clear infiltrate.  Elevated Pro-Hai cannot rule out mild community-acquired pneumonia.    Completed 5 day course of ceftriaxone on 04/21/24; monitor  Oxygen therapy as above, on room air 4/28  Recent Labs   Lab 05/01/24  0742 04/30/24  0758 04/26/24  0730   WBC 9.3 11.6* 12.4*    -Afebrile, no cough, sputum, WBC 11.6.    Atrial fibrillation, previously on anticoagulation -  Stopped following traumatic brain bleed on in Dec 2023.   History of Traumatic ICH in 12/2023  History of completed stroke  * Admission [facility] without current bleed: CT head with small chronic lacunar infarct right caudate.  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   By report, cardiology considering [facility]. See clinic neurology note 2/2/24 and clinic cardiology note 1/29/24 for details.   Anticoagulation cleared by NSG > However, extremely high fall risk with multiple hospitalizations secondary to falls, so holding until [facility], follow-up as outpatient.  PTA rate control with carvedilol (Coreg) as above  Anticoagulation for DVT prophylaxis after surgery as per orthopedics, apixaban as of 4/28.  Follow closely.     Chronic kidney disease 3b, hx nephrotic proteinuria  Acute kidney injury   Monitor serum Cr, urine output, I/O's, daily weights  Started torsemide 5 mg daily on 4/19  Encourage oral fluid intake  Recent Labs   Lab 05/01/24  0742 04/29/24  0740 04/28/24  0720 04/27/24  0733 04/26/24  0730 04/25/24  1331   CR 1.39* 1.50* 1.58* 1.67* 1.62* 1.28*   As above CR serially improving.    Left leg swelling  Ultrasound 4/18: No evidence of deep venous thrombosis.  Complex fluid   collection in the left groin. This most likely represents a hematoma.   Orthopedics consulted as above, monitor    Benign prostatic hypertrophy   Continue tamsulosin (Flomax)      Acute metabolic encephalopathy, resolved  Previous cognitive  decline  Mood disorder, with insomina  * Daughter notes that patient is at cognitive baseline, history of at times paranoia delusions.  Last slums 17/30.  No formal dementia diagnosis but in DDx. Past stroke as above.  History of alcohol abuse possible contributes.   Continue PTA mirtazapine 15 mg at bedtime   PRN Roserem on HOLD   Maintain normal day/night, sleep wake cycles  Minimize sedating/altering medications as able  Quetiapine PRN for insomnia and agitation at night only, monitor  Nursing notes no acute behavioral issues.     Recent C7 and T12 compression fracture with cervical canal stenosis on MRI during Februray  admission for left hip fracture.   Discharged on C-collar to be worn at all times until follow up x-rays and neurosurgical follow up, which was never done. No longer wearing c-collar.   No current report of neck pain, monitor  Neurosurgery consulted.  Appreciate neurosurgery evaluation by Shiela Warren PA-C on 4/18/24   Given he is having no neck pain, no further imaging or intervention.   Follow-up with primary clinic provider after discharge, monitor     Goals of care  Remains DNR/DNI  Past provider discussed goals of care with patient, with daughter and wife present.  Patient wanted to continue restorative cares.  Family agrees with his decision.     Disposition  Estimated length of stay 1 to 2 days  Anticipated discharge to transitional care unit   Social work consulted       Diet: Room Service  Moderate Consistent Carb (60 g CHO per Meal) Diet    DVT Prophylaxis: Enoxaparin (Lovenox) SQ  Pruett Catheter: Not present  Lines: None     Cardiac Monitoring: None  Code Status: No CPR- Do NOT Intubate      Clinically Significant Risk Factors                  # Hypertension: Noted on problem list  # Chronic heart failure with preserved ejection fraction: heart failure noted on problem list and last echo with EF >50%      # DMII: A1C = 9.0 % (Ref range: <5.7 %) within past 6 months       #  Financial/Environmental Concerns: none         Disposition Plan   1-2 days pending clinical improvement including glycemic control,, safe discharge, TCU     Pranav Ambrocio MD  Hospitalist Service  [facility]  Securely message with [employer] (more info)  Text page via [employer] Paging/Directory     I spent 35 minutes total time in management of care today 5/1/2024 reviewing labs, medications, interdisciplinary notes; and completing documentation of encounter and orders with Care Management including counseling/discussion withthe Patient regarding pain control, Therapies.  And Coordinating Care and plan with Nursing regarding glycemic control and Specialists, Ortho regarding  management and surveillance, as above  ______________________________________________________________________    Interval History   Patient participates in therapies.  Eating.  Denies pain.  Nursing notes no surgical wound issues.     Physical Exam   Vital Signs: Temp: 97.7  F (36.5  C) Temp src: Oral BP: (!) 149/87 Pulse: 73   Resp: 16 SpO2: 98 % O2 Device: None (Room air)    Weight: 166 lbs 7.16 oz    General/Constitutional:   NAD, alert, calm, cooperative  Chest/Respiratory: Respirations nonlabored room air  Cardiovascular:  no murmur appreciated.  LE edema none  Gastrointestinal/Abdomen:  soft, nontender,   Neuro.  Gross motor tested, nonfocal, ambulation not tested  Psych oriented, affect calm          Data     I have personally reviewed the following data over the past 24 hrs:    9.3  \   9.2 (L)   / 332     139 100 22.7 /  227 (H)   4.1 29 1.39 (H) \       Imaging results reviewed over the past 24 hrs:

## 2024-05-02 NOTE — PROGRESS NOTES
Care Management Discharge Note    Discharge Date: 05/02/2024       Discharge Disposition:      Discharge Services:      Discharge DME:      Discharge Transportation: health plan transportation    Private pay costs discussed: private room/amenity fees and transportation costs    Does the patient's insurance plan have a 3 day qualifying hospital stay waiver?  No    PAS Confirmation Code: [id number]  Patient/family educated on Medicare website which has current facility and service quality ratings:      Education Provided on the Discharge Plan:    Persons Notified of Discharge Plans: yes  Patient/Family in Agreement with the Plan: unable to assess    Handoff Referral Completed: No    Additional Information:  Call from Nima at [facility] that auth was received. Message to Dr. Ambrocio updating and requesting discharge orders if ready. PAS completed.    PAS-RR    D: Per DHS regulation, SW completed and submitted PAS-RR to [place] [employer] Direct Connect via the Senior LinkAge Line.  PAS-RR confirmation # is : [id number]    I: SW spoke with patient and they are aware a PAS-RR has been submitted.  SW reviewed with patient that they may be contacted for a follow up appointment within 10 days of hospital discharge if their SNF stay is < 30 days.  Contact information for Senior LinkAge Line was also provided.    A: patient verbalized understanding.    P: Further questions may be directed to [facility] LinkAge Line at #[phone], option #4 for PAS-RR staff.    Writer informed that patient can discharge today. Writer spoke to bedside nurse who noted that patient has some confusion and leans forward in the chair so stretcher was scheduled between [id number]. PCS completed and daughter updated with the above information. Dr. Ambrocio updated and requested discharge orders by 1330.     Orders received and faxed to facility.    JERE Cárdenas

## 2024-05-02 NOTE — PLAN OF CARE
Diagnosis: Left ANAI revision  POD#:  7  Mental Status: A&Ox2  Activity/dangle: A2, gait belt, Annette Steady  Diet: mod carb  Pain: managed w/ Tylenol and Oxycodone  Pruett/Voiding: incontinent B&B  02/LDA: room air, PIV SL  D/C Date: TCU, pending insurance authorization  Other Info:

## 2024-05-02 NOTE — DISCHARGE SUMMARY
[facility]    Discharge Summary  Hospitalist    Date of Admission:  4/17/2024  Date of Discharge:  5/2/2024  Discharging Provider: Pranav Ambrocio MD  Date of Service (when I saw the patient): 05/02/24      History of Present Illness   Tc Garcia is an 84 year old male with a past medical history of HFpEF, CKD, A-fib on AC, DM type I, diabetic nephropathy, HTN, pulmonary HTN, recent intracranial hemorrhage in 12/2023, recent L hip fracture, s/p IM nailing on 3/23/24, who was admitted on 4/17/2024 from TCU with shortness of breath, leg pain and swelling, and recent unwitnessed fall.     Hospital Course   Tc Garcia was admitted on 4/17/2024.  The following problems were addressed during his hospitalization:    S/p fall, with left hip fracture  S/p L femur fx 3/23/24 repair  S/p L total hip arthroplasty revision 4/25/245, Dr. Pulido  S/p left hip IM nail 3/23/24 at [place], presented from TCU with reported fall, SOB/, leg pain. XR w new left hip fracture.  *Surgery 4/25 with Dr. Pulido  DVT prophylaxis, wound care, pain control, activity orders as per orthopedics   Initially on enoxaparin (Lovenox) for DVT prophylaxis, transitioning to direct oral anticoagulant (DOAC), apixaban on 4/28  Per Ortho:   -Continue PTA Eliquis for DVT prophylaxis.    -Hgb stable.  -Mobilize with PT/OT, recommend discharge to TCU.  -WBAT LLE with walker.  -Posterior and trochanteric precautions for the left hip.    -Continue current pain regimen.  -Dressings: Keep intact. Okay for RN to change if >60% saturated or peeling/falling off.   -Follow-up: 2 weeks post-op with Dr. Yahir Pulido  -Working with therapies, nursing reports no wound issues.  -Therapies recommend TCU.     Diabetes mellitus with hyperglycemia, A1c 9% on 3/20/2024, uncontrolled  Hypoglycemia, intermittent, with periods of hyperglycemia  * Unclear if type I or type II, both listed in notes. Diagnosed in his 60s but on minimal insulin and not PO  medications, consistent with type I.   >250 on admit  *PTA lantus 8 units with humolog 4 units TID  hypoglycemia protocol ordered  Monitor blood sugars, continue to fluctuate; continue to monitor closely and adjust insulin prn; stress-state hyperglycemia with recent surgery 4/25  Diabetic diet  -4/29/2024 persistent hyperglycemia, as above, requiring 12-27 units NovoLog per carb counting and sliding scale, therefore increase Lantus by 3 units to 13 units.,  Monitor glucoses, adjust insulin accordingly.  -4/30/2024 in the late afternoon patient was rather severe hypoglycemia per nursing.  Previous orders included carb counting glucose however PTA patient without insulin per carb counting.  Patient eating.  Therefore DC'd carb counting insulin.  -5/2/2024 glucoses quite variable, therefore continue on current regimen Lantus 13 and SSI.  As above patient eating.  Will not make today as will transition to TCU with ongoing diabetes follow-up.        Chronic normocytic anemia  Acute blood loss anemia, postoperative (surgery 4/26)  Possible vomiting of blood 4/17  Iron deficiency (See iron indices below)   With recent hip fracture, prior baseline hemoglobin 4/15 7.8 with an MCV 90.6.   On arrival was 8.2, with shortness of breath, possibly due to volume overload vs symptomatic anemia.  * S/p 1 unit preop to get Hgb > 8 on 4/18 per ortho's recommendation. Wanted hgb > 8 prior to surgery.   *Hb 7 on 4/27, one unit RBC transfusion ordered, consent obtained  Monitor for signs of GI bleeding; no recurrent symptoms reported  Iron deficient (see labs in Epic)  Venofer 200 mg daily x 5 days ordered on 04/19/24.   AM hemoglobin 4/28, improved from 4/27 after RBC transufusion           Recent Labs   Lab 05/01/24  0742 04/30/24  0758 04/29/24  0740 04/28/24  0720 04/27/24  0733 04/26/24  0730   HGB 9.2* 8.7* 8.8* 9.0* 7.0* 7.9*   -Hemoglobin stable.  Monitor in TCU.  On apixaban.     Acute respiratory failure with hypoxia   Heart  failure with preserved ejection fraction (HFpEF), suspected volume overload  Elevated troponin, likely demand ischemia  Mild pulmonary hypertension  Shortness of breath at presentation. CXR showed chronic pulmonary vascular congestion and changes in left chest.  BNP 5000, last BNP 8120 on 4/15 (being seen for chest pain in the ED, they felt he likely was volume overloaded and increased his diuretic to 20 daily.).    * S/p Lasix 20 IV in ED.    * Low suspicion of acute coronary syndrome; monitored  On ECHO, EF > 70%, flattened septum consistent with volume overload.  Mildly decreased RV systolic function.  Severe biatrial enlargement.  Moderate to severe TR with dilation of the IVC and abnormal respiratory variation.  And severe pulmonary hypertension.  R heart cath on 4/18/24 indicated just mild pulmonary HTN, with elevated left sided filling pressures and wedge of 21, c/w some degree of fluid overload.   Started torsemide 5 mg daily on 04/19/24. (Previously discussed with cardiology, recommended gentle diuresis for high wedge.)  Monitor labs  On 04/21/24, serum Cr improved, with diuresis.  Oxygen therapy, monitor saturations, titrate as needed; encourage spirometry  Continue torsemide as above  Encourage incentive spirometry  Continue PTA carvedilol 6.25 mg twice daily   Continue PTA amlodipine 5 mg QD   Continue PTA isosorbide 60 mg every day  IV fluids discontinued 4/26   -On room air.  -BMP in a.m. on torsemide.  BMP 5/1/2024 CR downtrending 1.39 sodium, potassium WNL.  Monitor BMP and fluid status in TCU.        Questionable community-acquired pneumonia, low suspicion  * Noted to have elevated Pro-Hai and a white count of 20k, though felt could be demargination secondary to pain/fracture.  Afebrile.  CXR favored old opacity without clear infiltrate.  Elevated Pro-Hai cannot rule out mild community-acquired pneumonia.    Completed 5 day course of ceftriaxone on 04/21/24; monitor  Oxygen therapy as above, on room  air 4/28        Recent Labs   Lab 05/01/24  0742 04/30/24  0758 04/26/24  0730   WBC 9.3 11.6* 12.4*    -Afebrile, no cough, sputum, WBC 11.6.  Monitor in TCU.     Atrial fibrillation, previously on anticoagulation -  Stopped following traumatic brain bleed on in Dec 2023.   History of Traumatic ICH in 12/2023  History of completed stroke  * Admission [facility] without current bleed: CT head with small chronic lacunar infarct right caudate.  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   By report, cardiology considering [facility]. See clinic neurology note 2/2/24 and clinic cardiology note 1/29/24 for details.   Anticoagulation cleared by NSG > However, extremely high fall risk with multiple hospitalizations secondary to falls, so holding until [facility], follow-up as outpatient.  PTA rate control with carvedilol (Coreg) as above  Anticoagulation for DVT prophylaxis after surgery as per orthopedics, apixaban as of 4/28.  Follow closely.     Chronic kidney disease 3b, hx nephrotic proteinuria  Acute kidney injury   Monitor serum Cr, urine output, I/O's, daily weights  Started torsemide 5 mg daily on 4/19  Encourage oral fluid intake           Recent Labs   Lab 05/01/24  0742 04/29/24  0740 04/28/24  0720 04/27/24  0733 04/26/24  0730 04/25/24  1331   CR 1.39* 1.50* 1.58* 1.67* 1.62* 1.28*   As above CR serially improving.     Left leg swelling  Ultrasound 4/18: No evidence of deep venous thrombosis.  Complex fluid   collection in the left groin. This most likely represents a hematoma.   Orthopedics consulted as above, monitor.  No pain complaints.  Hemoglobin stable.  Monitor in TCU.     Benign prostatic hypertrophy   Continue tamsulosin (Flomax)      Acute metabolic encephalopathy, resolved  Previous cognitive decline  Mood disorder, with insomina  * Daughter notes that patient is at cognitive baseline, history of at times paranoia delusions.  Last slums 17/30.  No formal dementia  diagnosis but in DDx. Past stroke as above.  History of alcohol abuse possible contributes.   Continue PTA mirtazapine 15 mg at bedtime   PRN Roserem on HOLD   Maintain normal day/night, sleep wake cycles  Minimize sedating/altering medications as able  Quetiapine PRN for insomnia and agitation at night only, monitor  Nursing notes no acute behavioral issues.     Recent C7 and T12 compression fracture with cervical canal stenosis on MRI during Februray  admission for left hip fracture.   Discharged on C-collar to be worn at all times until follow up x-rays and neurosurgical follow up, which was never done. No longer wearing c-collar.   No current report of neck pain, monitor  Neurosurgery consulted.  Appreciate neurosurgery evaluation by Shiela Warren PA-C on 4/18/24   Given he is having no neck pain, no further imaging or intervention.   Follow-up with primary clinic provider after discharge, monitor     Goals of care  Remains DNR/DNI  Past provider discussed goals of care with patient, with daughter and wife present.  Patient wanted to continue restorative cares.  Family agrees with his decision.       Code Status   DNR / DNI       Primary Care Physician   Physician No Ref-Primary    Physical Exam   Temp: 97.8  F (36.6  C) Temp src: Oral BP: 120/89 Pulse: 79   Resp: 16 SpO2: 94 % O2 Device: None (Room air)    Vitals:    04/22/24 0607 04/23/24 0500 04/24/24 0706   Weight: 71 kg (156 lb 8.4 oz) 73.6 kg (162 lb 4.1 oz) 75.5 kg (166 lb 7.2 oz)     General/Constitutional:   NAD, more alert, calm, cooperative  Chest/Respiratory: Respirations nonlabored room air  Cardiovascular:  no murmur appreciated.  LE edema none  Gastrointestinal/Abdomen:  soft, nontender,   Neuro.  Gross motor tested, nonfocal, ambulation not tested  Psych oriented, affect calm      Discharge Disposition   Discharged to short-term care facility  Condition at discharge: Fair    Consultations This Hospital Stay   CARDIOLOGY IP CONSULT  ORTHOPEDIC  SURGERY IP CONSULT  PHYSICAL THERAPY ADULT IP CONSULT  OCCUPATIONAL THERAPY ADULT IP CONSULT  CARE MANAGEMENT / SOCIAL WORK IP CONSULT  NEUROSURGERY IP CONSULT  PHARMACY IP CONSULT  PHARMACY IP CONSULT  PHYSICAL THERAPY ADULT IP CONSULT  OCCUPATIONAL THERAPY ADULT IP CONSULT  CARE MANAGEMENT / SOCIAL WORK IP CONSULT  SPIRITUAL HEALTH SERVICES IP CONSULT  PHYSICAL THERAPY ADULT IP CONSULT  OCCUPATIONAL THERAPY ADULT IP CONSULT    Time Spent on this Encounter   Pranav HALEY MD, personally saw the patient today and spent greater than 30 minutes discharging this patient discussing discharge plans with Patient, Nursing and SW, coordinating discharge and transition to TCU, coordinating with specialties, Orthopedics regarding discharge medications and follow-up, completing discharge orders, medications and follow-up.    Discharge Orders      General info for SNF    Length of Stay Estimate: Short Term Care: Estimated # of Days <30  Condition at Discharge: Stable  Level of care:skilled   Rehabilitation Potential: Fair  Admission H&P remains valid and up-to-date: Yes  Recent Chemotherapy: N/A  Use Nursing Home Standing Orders: Yes     Mantoux instructions    Give two-step Mantoux (PPD) Per Facility Policy Yes     Follow Up and recommended labs and tests    Please call as soon as possible to make an appointment to be seen in Dr. Yahir Pulido's clinic at 2 weeks postop for a recheck of your surgical site, possible repeat x-rays, and wound care. If you are at a TCU at this time and they have x-ray capabilities, you may complete your wound care and x-rays at your TCU and have them send your images to Dr. Pulido's office.     Dr. Pulido's care coordinator is Sharmila Holman. Please contact her at [phone] to schedule an appointment.       Dr. Pulido sees patients at 2 clinic locations:  [facility]  [address] [place] [address]  [facility]   [address] [facility]  [address] [place] [id number]      Please call the on-call phone number [phone] during evenings, nights and weekends for any urgent needs. Prescription refills must be done during business hours by calling [phone].     Reason for your hospital stay    S/p left hip conversion from IM nail to hemiarthroplasty for a periprosthetic proximal femur fracture (DOS: 4/25/24) with Dr. Yahir Pulido     Wound care    Site:  Left hip  Instructions:  Please leave dressing intact for 2 weeks postoperatively. Okay to change if saturated >60% or peeling. If an Aquacel or gauze/tegaderm is in place over your surgical sites, it is okay to shower without covering the dressings. If you have a soft dressing, you must securely cover your dressing while showering or sponge bathe. Absolutely no soaking or submersion. Please contact your orthopedic surgical team if persistent drainage or redness develops around the surgical site.     Additional Discharge Instructions    Pain after surgery is normal and expected.  You will have some amount of pain and swelling for several weeks after surgery.  These symptoms will improve with time.  There are several things you can do to help reduce your pain including: rest, cold compresses, elevation, and using pain medications as needed. Contact your Surgeon Team if you have pain that persists or worsens after surgery despite rest, ice, elevation, and taking your medication(s) as prescribed. Contact your Surgeon Team if you have new numbness, tingling, or weakness in your operative extremity.    Swelling and/or bruising of the surgical extremity is common and may persist for several months after surgery. In addition to frequent icing and elevation, gentle compressive support with an ACE wrap or tubigrip may help with swelling. Apply compression regularly, removing at least twice daily to perform skin checks. Contact your Surgeon Team if your swelling increases and is NOT associated with an increase in  your activity level, or if your swelling increases and is associated with redness and pain.    Ice can be used to control swelling and discomfort after surgery. Place a thin towel over your operative site and apply the ice pack overtop. Leave ice pack in place for 20 minutes, then remove for 20 minutes. Repeat this 20 minutes on/20 minutes off routine as often as tolerated.    Please contact your surgical team with any concerns for infection including increasing redness around your surgical site, pus-like drainage, fever, chills, or flu-like symptoms.     Activity - Up with assistive device     Activity - Up with nursing assistance     Weight bearing status    WBAT LLE with walker     Follow Up and recommended labs and tests    In addition to Ortho follow-up;    In TCU: BMP and CBC, follow-up diabetes    After TCU discharge needs PCP follow-up appointment.     Physical Therapy Adult Consult    Evaluate and treat as clinically indicated.    Reason: S/p left hip conversion from IM nail to hemiarthroplasty for a periprosthetic proximal femur fracture (DOS: 4/25/24) with Dr. Yahir Pulido     Occupational Therapy Adult Consult    Evaluate and treat as clinically indicated.    Reason: S/p left hip conversion from IM nail to hemiarthroplasty for a periprosthetic proximal femur fracture (DOS: 4/25/24) with Dr. Yahir Pulido     Fall precautions     Hip precautions    Left hip: no IR, adduction past midline, flexion >90 degrees, or active abduction     Cane DME    DME Documentation: Describe the reason for need to support medical necessity: Impaired gait status post hip surgery. I, the undersigned, certify that the above prescribed supplies are medically necessary for this patient and is both reasonable and necessary in reference to accepted standards of medical practice in the treatment of this patient's condition and is not prescribed as a convenience.     Walker DME    DME Documentation: Describe the reason for need to support  medical necessity: Impaired gait status post hip surgery. I, the undersigned, certify that the above prescribed supplies are medically necessary for this patient and is both reasonable and necessary in reference to accepted standards of medical practice in the treatment of this patient's condition and is not prescribed as a convenience.     Diet    Follow this diet upon discharge:     Moderate Consistent Carb Diabetic (60 g CHO per Meal) Diet     Discharge Medications   Current Discharge Medication List        START taking these medications    Details   apixaban ANTICOAGULANT (ELIQUIS) 5 MG tablet Take 1 tablet (5 mg) by mouth 2 times daily    Associated Diagnoses: Chronic atrial fibrillation (H)      artificial saliva (BIOTENE MT) SOLN solution Take 1 mL (1 spray) by mouth 4 times daily    Associated Diagnoses: Sicca, unspecified type (H24)      !! insulin aspart (NOVOLOG PEN) 100 UNIT/ML pen Inject 1-10 Units Subcutaneous 3 times daily (before meals) Correction Scale - HIGH INSULIN RESISTANCE DOSING   Do Not give Correction Insulin if Pre-Meal BG less than 140. For Pre-Meal  - 164 give 2 unit. For Pre-Meal  - 189 give 3 units. For Pre-Meal  - 214 give 4 units. For Pre-Meal  - 239 give 5 units. For Pre-Meal  - 264 give 6 units. For Pre-Meal  - 289 give 7 units. For Pre-Meal  - 314 give 8 units. For Pre-Meal  - 339 give 9 units. For Pre-Meal  - 364 give 10 units.  For Pre-Meal BG greater than or equal to 365 give 12 units To be given with prandial insulin, and based on pre-meal blood glucose. Administering insulin within 5 minutes of the start of the meal is ideal. Administer insulin no more than 30 minutes after the start of the meal, unless directed otherwise by provider. Notify provider if glucose greater than or equal to 350 mg/dL after administration of correction dose.    Associated Diagnoses: Type 2 diabetes mellitus with other specified complication, with  long-term current use of insulin (H)      !! insulin aspart (NOVOLOG PEN) 100 UNIT/ML pen Inject 1-7 Units Subcutaneous at bedtime HIGH INSULIN RESISTANCE DOSING  Do Not give Bedtime Correction Insulin if BG less than 200. For  - 224 give 2 units. For  - 249 give 3 units. For  - 274 give 4 units. For  - 299 give 5 units. For  - 324 give 6 units. For  - 349 give 7 units. For BG greater than or equal to 350 give 8 units and recheck blood sugar 1 hour later Notify provider if glucose greater than or equal to 350 mg/dL after administration of correction dose.    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 - Potential duplicate medications found. Please discuss with provider.        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    Associated Diagnoses: Type 2 diabetes mellitus with other specified complication, with long-term current use of insulin (H)           CONTINUE these medications which have NOT CHANGED    Details   carvedilol (COREG) 6.25 MG tablet Take 6.25 mg by mouth 2 times daily (with meals) - 0800, 1730      glucose (BD GLUCOSE) 4 g chewable tablet Take 4 tablets by mouth every 15 minutes as needed for low blood sugar  Qty: 50 tablet, Refills: 0    Associated Diagnoses: Inadequately controlled diabetes mellitus (H)      isosorbide mononitrate (IMDUR) 60 MG 24 hr tablet Take 1 tablet (60 mg) by mouth every evening  Qty: 30 tablet, Refills: 0    Associated Diagnoses: Essential hypertension      mirtazapine (REMERON) 15 MG tablet Take 1 tablet (15 mg) by mouth at bedtime  Qty: 30 tablet, Refills: 0    Associated Diagnoses: Intraparenchymal hemorrhage of brain (H)      tamsulosin (FLOMAX) 0.4 MG capsule Take 1 capsule (0.4 mg) by mouth every morning  Qty: 30 capsule, Refills: 0    Associated Diagnoses: Intraparenchymal hemorrhage of brain (H)      Alcohol Swabs PADS Use to swab the area of the injection or bina as directed Per insurance coverage  Qty: 100 each, Refills: 0    Associated Diagnoses: Inadequately controlled diabetes mellitus (H)      blood glucose (NO BRAND SPECIFIED) lancets standard To use to test glucose level in the blood Use to test blood sugar  4  times daily as directed. To accompany glucose monitor brands per insurance coverage.  Qty: 100 each, Refills: 0    Associated Diagnoses: Inadequately controlled diabetes mellitus (H)      blood glucose (NO BRAND SPECIFIED) test strip To  use to test glucose level in the blood Use to test blood sugar  4 times daily as directed. To accompany glucose monitor brands per insurance coverage.  Qty: 100 strip, Refills: 0    Associated Diagnoses: Inadequately controlled diabetes mellitus (H)      blood glucose monitoring (NO BRAND SPECIFIED) meter device kit Use as directed Per insurance coverage  Qty: 1 kit, Refills: 0    Associated Diagnoses: Inadequately controlled diabetes mellitus (H)      Continuous Blood Gluc  (DEXCOM G6 ) JOSE Dexcom G6    USE EACH WITH 10 DAYS SENSORS      insulin pen needle (32G X 4 MM) 32G X 4 MM miscellaneous Use as directed by provider Per insurance coverage  Qty: 100 each, Refills: 0    Associated Diagnoses: Inadequately controlled diabetes mellitus (H)           STOP taking these medications       aspirin 81 MG EC tablet Comments:   Reason for Stopping:         furosemide (LASIX) 20 MG tablet Comments:   Reason for Stopping:         HYDROcodone-acetaminophen (NORCO) 5-325 MG tablet Comments:   Reason for Stopping:         insulin lispro (HUMALOG KWIKPEN) 100 UNIT/ML (1 unit dial) KWIKPEN Comments:   Reason for Stopping:         LACTOBACILLUS PO Comments:   Reason for Stopping:         Lidocaine (LIDOCARE) 4 % Patch Comments:   Reason for Stopping:         ramelteon (ROZEREM) 8 MG tablet Comments:   Reason for Stopping:             Allergies   Allergies   Allergen Reactions    No Known Drug Allergy      Data   Most Recent 3 CBC's:  Recent Labs   Lab Test 05/02/24  0718 05/01/24  0742 04/30/24  0758 04/29/24  0740 04/27/24  0733 04/26/24  0730   WBC  --  9.3 11.6*  --   --  12.4*   HGB  --  9.2* 8.7* 8.8*   < > 7.9*   MCV  --  92 90  --   --  92    332 288 285   < > 240    < > = values in this interval not displayed.      Most Recent 3 BMP's:  Recent Labs   Lab Test 05/02/24  1149 05/02/24  0820 05/02/24  0718 05/02/24  0151 05/01/24  0824 05/01/24  0742 04/29/24  0757 04/29/24  0740 04/28/24  1135  04/28/24  0720   NA  --   --   --   --   --  139  --  135  --  136   POTASSIUM  --   --   --   --   --  4.1  --  4.5  --  4.8   CHLORIDE  --   --   --   --   --  100  --  97*  --  96*   CO2  --   --   --   --   --  29  --  29  --  25   BUN  --   --   --   --   --  22.7  --  29.7*  --  33.1*   CR  --   --  1.48*  --   --  1.39*  --  1.50*  --  1.58*   ANIONGAP  --   --   --   --   --  10  --  9  --  15   LLOYD  --   --   --   --   --  8.9  --  8.5*  --  8.8   * 345*  --  165*   < > 139*   < > 309*   < > 424*    < > = values in this interval not displayed.     Most Recent 2 LFT's:  Recent Labs   Lab Test 03/18/24  1206 03/13/24  1400   AST 18 98*   ALT 18 41   ALKPHOS 114 118   BILITOTAL 0.4 0.3

## 2024-05-02 NOTE — PROGRESS NOTES
Diagnosis: Left ANAI revision  POD#:  7  Mental Status: A&Ox2  Activity/dangle: A2, gait belt, Annette Stedy  Diet: Mod carb  Pain: Denied  Pruett/Voiding: incontinent B&B  02/LDA: room air, PIV SL  D/C Date: Possible discharge today.  Other Info: redness on scrotum

## 2024-09-17 NOTE — ED PROVIDER NOTES
"  History     Chief Complaint:  Shortness of Breath and Leg Swelling       HPI   Tc Garcia is a 84 year old male with history of CHF, type 2 diabetes mellitus, hyperlipidemia, hypertension, acute respiratory failure, recurrent left pleural effusion, DKA, A-Fib, and stage 3 CKD who presents to the ED via EMS from his nursing home for evaluation of shortness of breath and leg swelling. Patient presents with left hip pain that is \"killing him.\" States that he broke the same hip about a month ago but the hip pain is new. Endorses shortness of breath. Denies chest pain or abdominal pain. Of note, patient was seen at [facility] yesterday and Lasix was increased with no relief. Fluctuating oxygen levels at time of evaluation. Nurse present reports he can use oxygen at baseline and notes he had a fall earlier today.     Independent Historian:    Patient and Nurse - They report above history     Review of External Notes:  I reviewed Dr. Puga's Admission note from 3/22/24 for closed intertrochanteric fracture of the left femur.      Allergies:  No Known Active Allergies     Relevant Medical History:  CHF  Type 2 diabetes mellitus  Hyperlipidemia  Hypertension   Hemothorax on left  Recurrent left pleural effusion  ABBIE  Acute respiratory failure with hypoxia   Stage 3 chronic renal failure   Anemia  Atrial fibrillation/flutter  DKA  Inguinal hernia  Cholecystitis  Alcohol dependence  Syncope and collapse  Chronic diastolic heart failure   Intraventricular intracerebral hemorrhage   Hypoglycemia  Hypoxia  Hypotension  Diabetic nephropathy   Malignant hypertensive kidney disease  Nephrotic range proteinuria   Secondary hyperparathyroidism of renal origin  Stage 3b CKD   Vitamin D deficiency   Intraparenchymal hemorrhage of brain   Acute cystitis   Hyperglycemia   Benign prostatic hyperplasia   Chronic back pain   Chronic obstructive lung disease   Depressive disorder   Diverticular disease of colon   Hyperkalemia   Insomnia " Negative "  Malignant tumor of prostate   Osteoporosis   UTI     Physical Exam   Patient Vitals for the past 24 hrs:   BP Temp Temp src Pulse Resp SpO2 Height Weight   04/17/24 1900 130/75 -- -- 75 -- -- -- --   04/17/24 1830 (!) 142/71 -- -- 76 -- 94 % -- --   04/17/24 1730 (!) 154/90 -- -- 88 -- -- -- --   04/17/24 1715 138/75 -- -- 70 -- 97 % -- --   04/17/24 1700 (!) 149/80 -- -- 76 -- 96 % -- --   04/17/24 1645 (!) 141/79 -- -- 79 -- -- -- --   04/17/24 1630 (!) 140/81 -- -- 77 -- -- -- --   04/17/24 1615 126/74 -- -- 81 -- -- -- --   04/17/24 1600 134/72 -- -- 74 -- 95 % -- --   04/17/24 1545 (!) 134/93 -- -- 87 -- 93 % -- --   04/17/24 1533 134/68 98.4  F (36.9  C) Oral 86 24 90 % 1.753 m (5' 9\") 72.6 kg (160 lb)        Physical Exam    General: Chronically ill appearing   HEENT: Conjunctivae clear, no scleral icterus, mucous membranes moist  Neuro: Alert, moving all extremities equally with intention  CV: Regular rate and rhythm, radial and DP pulses equal, murmur present   DP pulses intact bilaterally   Respiratory: Tachypneic, coarse crackles on the left    Abdomen: Soft, without rigidity or rebound throughout  MSK: Pitting edema bilaterally, worse on the left than on the right.   Tender to palpation over the left hip    Emergency Department Course       Laboratory: Imaging:   Labs Ordered and Resulted from Time of ED Arrival to Time of ED Departure   BASIC METABOLIC PANEL - Abnormal       Result Value    Sodium 135      Potassium 4.8      Chloride 98      Carbon Dioxide (CO2) 22      Anion Gap 15      Urea Nitrogen 27.1 (*)     Creatinine 1.35 (*)     GFR Estimate 51 (*)     Calcium 8.7 (*)     Glucose 253 (*)    CBC WITH PLATELETS AND DIFFERENTIAL - Abnormal    WBC Count 20.8 (*)     RBC Count 2.90 (*)     Hemoglobin 8.2 (*)     Hematocrit 26.1 (*)     MCV 90      MCH 28.3      MCHC 31.4 (*)     RDW 17.5 (*)     Platelet Count 312      % Neutrophils 89      % Lymphocytes 1      % Monocytes 8      % Eosinophils 1   "    % Basophils 0      % Immature Granulocytes 1      NRBCs per 100 WBC 0      Absolute Neutrophils 18.4 (*)     Absolute Lymphocytes 0.2 (*)     Absolute Monocytes 1.7 (*)     Absolute Eosinophils 0.2      Absolute Basophils 0.1      Absolute Immature Granulocytes 0.2      Absolute NRBCs 0.0     PROCALCITONIN - Abnormal    Procalcitonin 1.06 (*)    NT PROBNP INPATIENT - Abnormal    N terminal Pro BNP Inpatient 5,202 (*)    TROPONIN T, HIGH SENSITIVITY - Abnormal    Troponin T, High Sensitivity 39 (*)    LACTIC ACID WHOLE BLOOD - Normal    Lactic Acid 1.9     INFLUENZA A/B, RSV, & SARS-COV2 PCR - Normal    Influenza A PCR Negative      Influenza B PCR Negative      RSV PCR Negative      SARS CoV2 PCR Negative     ROUTINE UA WITH MICROSCOPIC REFLEX TO CULTURE   TROPONIN T, HIGH SENSITIVITY     XR Pelvis 1/2 Views   Final Result   IMPRESSION: Acute appearing highly comminuted and displaced intertrochanteric fracture of the proximal left femur. Status post intramedullary sunil and screw fixation; limited evaluation due to positioning. The distal screw head sits 8 mm proud from the    cortical surface. There is normal joint alignment. Osteopenia. Vascular calcification.       Chest XR,  PA & LAT   Final Result   IMPRESSION: Patient's rotated to the left. Cardiomegaly and slight pulmonary vascular congestion is unchanged. Haziness to the left chest with blunting of the left costophrenic angle is unchanged and reflects posttraumatic changes of the pleura. Old    right lateral seventh rib fracture as well.      XR Femur Left 2 Views   Final Result   IMPRESSION: Acute appearing highly comminuted and displaced intertrochanteric fracture of the proximal left femur. Status post intramedullary sunil and screw fixation; limited evaluation due to positioning. The distal screw head sits 8 mm proud from the    cortical surface. There is normal joint alignment. Osteopenia. Vascular calcification.               Procedures   None      Emergency Department Course & Assessments:             Interventions:  Medications   acetaminophen (TYLENOL) tablet 1,000 mg (1,000 mg Oral $Given 24)        Assessments, Independent Interpretation, Consult/Discussion of ManagementTests:  ED Course as of 24   165 I obtained patient history and performed a physical exam.     N-Terminal Pro BNP Inpatient(!): 5,202       Social Determinants of Health affecting care:  None    Disposition:  The patient was admitted to the hospital under the care of Dr. Brown.     Impression & Plan    CMS Diagnoses: None    Code Status: Prior      Medical Decision MakinyM with hx of HF, left femur fx s/p ORIF and intramedullary nail, afib not on anticoagulation, who presented to the emergency department with a chief complaint of hypoxia as well as left hip pain.  Patient appeared stable, chronically unwell.  He had crackles over the left lung, BNP was elevated, also had a leukocytosis to 20 and procalcitonin elevation therefore mixed picture of heart failure exacerbation versus infection and I did choose to treat with antibiotics.  In regards to his left hip pain, he was neurovascularly intact distally however did have tenderness over the left hip and x-rays demonstrated acute appearing fracture at the site of his previous ORIF.  Patient was given dose of Lasix for fluid overload discussed with orthopedic surgery Dr. Cook and hospitalist Dr Brown who kindly accepted him for admission.     Diagnosis:    ICD-10-CM    1. Closed nondisplaced intertrochanteric fracture of left femur with nonunion, subsequent encounter  S72.482K Case Request: REVISION, TOTAL ARTHROPLASTY, HIP     Case Request: REVISION, TOTAL ARTHROPLASTY, HIP      2. Acute hypoxic respiratory failure (H)  J96.01       3. History of heart failure  Z86.79       4. Closed comminuted intertrochanteric fracture of left femur, initial encounter (H)  S72.142A       5. Acute  on chronic congestive heart failure, unspecified heart failure type (H)  I50.9            Discharge Medications:  New Prescriptions    No medications on file        Scribe Disclosure:  I, Cathi Plasencia, am serving as a scribe at 5:08 PM on 4/17/2024 to document services personally performed by Alda Zhu MD based on my observations and the provider's statements to me.   4/17/2024   Alda Zhu MD Wu Klasek, Connie, MD  04/18/24 1042
